# Patient Record
Sex: FEMALE | Race: WHITE | NOT HISPANIC OR LATINO | Employment: OTHER | URBAN - NONMETROPOLITAN AREA
[De-identification: names, ages, dates, MRNs, and addresses within clinical notes are randomized per-mention and may not be internally consistent; named-entity substitution may affect disease eponyms.]

---

## 2017-02-01 ENCOUNTER — HOSPITAL ENCOUNTER (EMERGENCY)
Facility: HOSPITAL | Age: 37
Discharge: HOME | End: 2017-02-01
Attending: EMERGENCY MEDICINE
Payer: MEDICARE

## 2017-02-01 VITALS
TEMPERATURE: 97.6 F | OXYGEN SATURATION: 97 % | DIASTOLIC BLOOD PRESSURE: 53 MMHG | WEIGHT: 90 LBS | RESPIRATION RATE: 18 BRPM | HEART RATE: 94 BPM | SYSTOLIC BLOOD PRESSURE: 80 MMHG

## 2017-02-01 DIAGNOSIS — K94.23 PEG TUBE MALFUNCTION (HCC): Primary | ICD-10-CM

## 2017-02-01 PROCEDURE — 99284 EMERGENCY DEPT VISIT MOD MDM: CPT

## 2017-02-16 ENCOUNTER — HOSPITAL ENCOUNTER (EMERGENCY)
Facility: HOSPITAL | Age: 37
Discharge: NURSING, REHAB OR LONG-TERM CARE | End: 2017-02-17
Attending: EMERGENCY MEDICINE
Payer: MEDICARE

## 2017-02-16 DIAGNOSIS — E72.20 HYPERAMMONEMIA (HCC): ICD-10-CM

## 2017-02-16 DIAGNOSIS — N39.0 UTI (URINARY TRACT INFECTION): Primary | ICD-10-CM

## 2017-02-16 LAB — PHENYTOIN SERPL-MCNC: <0.4 UG/ML (ref 10–20)

## 2017-02-16 PROCEDURE — 80185 ASSAY OF PHENYTOIN TOTAL: CPT | Performed by: EMERGENCY MEDICINE

## 2017-02-16 PROCEDURE — 36415 COLL VENOUS BLD VENIPUNCTURE: CPT | Performed by: EMERGENCY MEDICINE

## 2017-02-16 RX ORDER — LACTULOSE 20 G/30ML
20 SOLUTION ORAL ONCE
Status: COMPLETED | OUTPATIENT
Start: 2017-02-16 | End: 2017-02-16

## 2017-02-16 RX ORDER — AMOXICILLIN AND CLAVULANATE POTASSIUM 400; 57 MG/5ML; MG/5ML
875 POWDER, FOR SUSPENSION ORAL ONCE
Status: COMPLETED | OUTPATIENT
Start: 2017-02-16 | End: 2017-02-16

## 2017-02-16 RX ADMIN — LACTULOSE 20 G: 10 SOLUTION ORAL at 21:20

## 2017-02-16 RX ADMIN — AMOXICILLIN AND CLAVULANATE POTASSIUM 875 MG OF AMOXICILLIN: 400; 57 POWDER, FOR SUSPENSION ORAL at 21:03

## 2017-02-17 VITALS
SYSTOLIC BLOOD PRESSURE: 111 MMHG | HEART RATE: 84 BPM | OXYGEN SATURATION: 98 % | DIASTOLIC BLOOD PRESSURE: 68 MMHG | WEIGHT: 90.6 LBS | TEMPERATURE: 98.7 F | RESPIRATION RATE: 18 BRPM

## 2017-02-17 PROCEDURE — 99285 EMERGENCY DEPT VISIT HI MDM: CPT

## 2017-02-21 ENCOUNTER — GENERIC CONVERSION - ENCOUNTER (OUTPATIENT)
Dept: OTHER | Facility: OTHER | Age: 37
End: 2017-02-21

## 2017-02-22 ENCOUNTER — HOSPITAL ENCOUNTER (INPATIENT)
Facility: HOSPITAL | Age: 37
LOS: 5 days | Discharge: NURSING, REHAB OR LONG-TERM CARE | DRG: 871 | End: 2017-02-27
Attending: HOSPITALIST | Admitting: HOSPITALIST
Payer: MEDICARE

## 2017-02-22 ENCOUNTER — APPOINTMENT (EMERGENCY)
Dept: RADIOLOGY | Facility: HOSPITAL | Age: 37
End: 2017-02-22
Payer: MEDICARE

## 2017-02-22 ENCOUNTER — HOSPITAL ENCOUNTER (EMERGENCY)
Facility: HOSPITAL | Age: 37
End: 2017-02-22
Attending: EMERGENCY MEDICINE | Admitting: EMERGENCY MEDICINE
Payer: MEDICARE

## 2017-02-22 ENCOUNTER — APPOINTMENT (EMERGENCY)
Dept: CT IMAGING | Facility: HOSPITAL | Age: 37
End: 2017-02-22
Payer: MEDICARE

## 2017-02-22 VITALS
RESPIRATION RATE: 18 BRPM | OXYGEN SATURATION: 92 % | HEART RATE: 100 BPM | WEIGHT: 90 LBS | TEMPERATURE: 100.6 F | DIASTOLIC BLOOD PRESSURE: 56 MMHG | SYSTOLIC BLOOD PRESSURE: 109 MMHG

## 2017-02-22 DIAGNOSIS — G40.812 LENNOX-GASTAUT SYNDROME, NOT INTRACTABLE, WITHOUT STATUS EPILEPTICUS (HCC): ICD-10-CM

## 2017-02-22 DIAGNOSIS — Z79.899 OTHER LONG TERM (CURRENT) DRUG THERAPY: ICD-10-CM

## 2017-02-22 DIAGNOSIS — J18.9 SEPSIS DUE TO PNEUMONIA (HCC): ICD-10-CM

## 2017-02-22 DIAGNOSIS — A41.9 SEPSIS DUE TO PNEUMONIA (HCC): ICD-10-CM

## 2017-02-22 DIAGNOSIS — J18.9 RIGHT UPPER LOBE PNEUMONIA: Primary | ICD-10-CM

## 2017-02-22 PROBLEM — R56.9 SEIZURE (HCC): Status: ACTIVE | Noted: 2017-02-22

## 2017-02-22 PROBLEM — R13.10 DYSPHAGIA: Status: ACTIVE | Noted: 2017-02-22

## 2017-02-22 LAB
ALBUMIN SERPL BCP-MCNC: 3.1 G/DL (ref 3.5–5)
ALP SERPL-CCNC: 104 U/L (ref 46–116)
ALT SERPL W P-5'-P-CCNC: 32 U/L (ref 12–78)
ANION GAP SERPL CALCULATED.3IONS-SCNC: 12 MMOL/L (ref 4–13)
APTT PPP: 31 SECONDS (ref 24–36)
AST SERPL W P-5'-P-CCNC: 24 U/L (ref 5–45)
ATRIAL RATE: 121 BPM
BASOPHILS # BLD MANUAL: 0 THOUSAND/UL (ref 0–0.1)
BASOPHILS NFR MAR MANUAL: 0 % (ref 0–1)
BILIRUB SERPL-MCNC: 0.3 MG/DL (ref 0.2–1)
BILIRUB UR QL STRIP: NEGATIVE
BUN SERPL-MCNC: 13 MG/DL (ref 5–25)
CALCIUM SERPL-MCNC: 8.4 MG/DL (ref 8.3–10.1)
CHLORIDE SERPL-SCNC: 104 MMOL/L (ref 100–108)
CLARITY UR: CLEAR
CO2 SERPL-SCNC: 23 MMOL/L (ref 21–32)
COLOR UR: YELLOW
CREAT SERPL-MCNC: 0.63 MG/DL (ref 0.6–1.3)
EOSINOPHIL # BLD MANUAL: 0 THOUSAND/UL (ref 0–0.4)
EOSINOPHIL NFR BLD MANUAL: 0 % (ref 0–6)
ERYTHROCYTE [DISTWIDTH] IN BLOOD BY AUTOMATED COUNT: 13.9 % (ref 11.6–15.1)
GFR SERPL CREATININE-BSD FRML MDRD: >60 ML/MIN/1.73SQ M
GLUCOSE SERPL-MCNC: 178 MG/DL (ref 65–140)
GLUCOSE UR STRIP-MCNC: NEGATIVE MG/DL
HCG SERPL QL: NEGATIVE
HCT VFR BLD AUTO: 43.2 % (ref 34.8–46.1)
HGB BLD-MCNC: 14.1 G/DL (ref 11.5–15.4)
HGB UR QL STRIP.AUTO: NEGATIVE
HOLD SPECIMEN: NORMAL
HOLD SPECIMEN: NORMAL
HOLD SPECIMEN: YES
INR PPP: 1.18 (ref 0.86–1.16)
KETONES UR STRIP-MCNC: ABNORMAL MG/DL
LACTATE SERPL-SCNC: 1.4 MMOL/L (ref 0.5–2)
LACTATE SERPL-SCNC: 2.5 MMOL/L (ref 0.5–2)
LEUKOCYTE ESTERASE UR QL STRIP: NEGATIVE
LG PLATELETS BLD QL SMEAR: PRESENT
LYMPHOCYTES # BLD AUTO: 2.01 THOUSAND/UL (ref 0.6–4.47)
LYMPHOCYTES # BLD AUTO: 9 % (ref 14–44)
MAGNESIUM SERPL-MCNC: 2.3 MG/DL (ref 1.6–2.6)
MCH RBC QN AUTO: 33.6 PG (ref 26.8–34.3)
MCHC RBC AUTO-ENTMCNC: 32.6 G/DL (ref 31.4–37.4)
MCV RBC AUTO: 103 FL (ref 82–98)
MONOCYTES # BLD AUTO: 0.67 THOUSAND/UL (ref 0–1.22)
MONOCYTES NFR BLD: 3 % (ref 4–12)
NEUTROPHILS # BLD MANUAL: 19.66 THOUSAND/UL (ref 1.85–7.62)
NEUTS BAND NFR BLD MANUAL: 4 % (ref 0–8)
NEUTS SEG NFR BLD AUTO: 84 % (ref 43–75)
NITRITE UR QL STRIP: NEGATIVE
P AXIS: 69 DEGREES
PH UR STRIP.AUTO: 7 [PH] (ref 4.5–8)
PHENYTOIN SERPL-MCNC: <0.4 UG/ML (ref 10–20)
PLATELET # BLD AUTO: 188 THOUSANDS/UL (ref 149–390)
PLATELET BLD QL SMEAR: ADEQUATE
PMV BLD AUTO: 11.2 FL (ref 8.9–12.7)
POTASSIUM SERPL-SCNC: 3.8 MMOL/L (ref 3.5–5.3)
PR INTERVAL: 126 MS
PROT SERPL-MCNC: 7.2 G/DL (ref 6.4–8.2)
PROT UR STRIP-MCNC: NEGATIVE MG/DL
PROTHROMBIN TIME: 14.6 SECONDS (ref 12–14.3)
QRS AXIS: 86 DEGREES
QRSD INTERVAL: 74 MS
QT INTERVAL: 276 MS
QTC INTERVAL: 391 MS
RBC # BLD AUTO: 4.2 MILLION/UL (ref 3.81–5.12)
SODIUM SERPL-SCNC: 139 MMOL/L (ref 136–145)
SP GR UR STRIP.AUTO: 1.01 (ref 1–1.03)
T WAVE AXIS: 39 DEGREES
TOTAL CELLS COUNTED SPEC: 100
TROPONIN I SERPL-MCNC: <0.02 NG/ML
UROBILINOGEN UR QL STRIP.AUTO: 1 E.U./DL
VENTRICULAR RATE: 121 BPM
WBC # BLD AUTO: 22.34 THOUSAND/UL (ref 4.31–10.16)

## 2017-02-22 PROCEDURE — 80185 ASSAY OF PHENYTOIN TOTAL: CPT | Performed by: EMERGENCY MEDICINE

## 2017-02-22 PROCEDURE — 93005 ELECTROCARDIOGRAM TRACING: CPT | Performed by: EMERGENCY MEDICINE

## 2017-02-22 PROCEDURE — 71010 HB CHEST X-RAY 1 VIEW FRONTAL (PORTABLE): CPT

## 2017-02-22 PROCEDURE — 85610 PROTHROMBIN TIME: CPT | Performed by: EMERGENCY MEDICINE

## 2017-02-22 PROCEDURE — 85730 THROMBOPLASTIN TIME PARTIAL: CPT | Performed by: EMERGENCY MEDICINE

## 2017-02-22 PROCEDURE — 84703 CHORIONIC GONADOTROPIN ASSAY: CPT | Performed by: EMERGENCY MEDICINE

## 2017-02-22 PROCEDURE — 71260 CT THORAX DX C+: CPT

## 2017-02-22 PROCEDURE — 96361 HYDRATE IV INFUSION ADD-ON: CPT

## 2017-02-22 PROCEDURE — 85027 COMPLETE CBC AUTOMATED: CPT | Performed by: EMERGENCY MEDICINE

## 2017-02-22 PROCEDURE — 85007 BL SMEAR W/DIFF WBC COUNT: CPT | Performed by: EMERGENCY MEDICINE

## 2017-02-22 PROCEDURE — 94760 N-INVAS EAR/PLS OXIMETRY 1: CPT

## 2017-02-22 PROCEDURE — 99285 EMERGENCY DEPT VISIT HI MDM: CPT

## 2017-02-22 PROCEDURE — 94640 AIRWAY INHALATION TREATMENT: CPT

## 2017-02-22 PROCEDURE — 80053 COMPREHEN METABOLIC PANEL: CPT | Performed by: EMERGENCY MEDICINE

## 2017-02-22 PROCEDURE — 87040 BLOOD CULTURE FOR BACTERIA: CPT | Performed by: EMERGENCY MEDICINE

## 2017-02-22 PROCEDURE — 81003 URINALYSIS AUTO W/O SCOPE: CPT | Performed by: EMERGENCY MEDICINE

## 2017-02-22 PROCEDURE — 94669 MECHANICAL CHEST WALL OSCILL: CPT

## 2017-02-22 PROCEDURE — 84484 ASSAY OF TROPONIN QUANT: CPT | Performed by: EMERGENCY MEDICINE

## 2017-02-22 PROCEDURE — 96367 TX/PROPH/DG ADDL SEQ IV INF: CPT

## 2017-02-22 PROCEDURE — 74177 CT ABD & PELVIS W/CONTRAST: CPT

## 2017-02-22 PROCEDURE — 83735 ASSAY OF MAGNESIUM: CPT | Performed by: EMERGENCY MEDICINE

## 2017-02-22 PROCEDURE — 36415 COLL VENOUS BLD VENIPUNCTURE: CPT

## 2017-02-22 PROCEDURE — 83605 ASSAY OF LACTIC ACID: CPT | Performed by: EMERGENCY MEDICINE

## 2017-02-22 PROCEDURE — 70450 CT HEAD/BRAIN W/O DYE: CPT

## 2017-02-22 PROCEDURE — 96365 THER/PROPH/DIAG IV INF INIT: CPT

## 2017-02-22 RX ORDER — ACETAMINOPHEN 160 MG/5ML
SOLUTION ORAL
Status: COMPLETED
Start: 2017-02-22 | End: 2017-02-22

## 2017-02-22 RX ORDER — RUFINAMIDE 400 MG/1
800 TABLET, FILM COATED ORAL EVERY 12 HOURS
Status: DISCONTINUED | OUTPATIENT
Start: 2017-02-22 | End: 2017-02-22

## 2017-02-22 RX ORDER — ACETAMINOPHEN 160 MG/5ML
650 SUSPENSION, ORAL (FINAL DOSE FORM) ORAL ONCE
Status: COMPLETED | OUTPATIENT
Start: 2017-02-22 | End: 2017-02-22

## 2017-02-22 RX ORDER — VALPROIC ACID 250 MG/5ML
5 SOLUTION ORAL EVERY 8 HOURS
COMMUNITY
End: 2017-12-07 | Stop reason: HOSPADM

## 2017-02-22 RX ORDER — TOPIRAMATE 100 MG/1
200 TABLET, FILM COATED ORAL EVERY 12 HOURS
Status: DISCONTINUED | OUTPATIENT
Start: 2017-02-22 | End: 2017-02-27 | Stop reason: HOSPADM

## 2017-02-22 RX ORDER — 0.9 % SODIUM CHLORIDE 0.9 %
3 VIAL (ML) INJECTION AS NEEDED
Status: DISCONTINUED | OUTPATIENT
Start: 2017-02-22 | End: 2017-02-22 | Stop reason: HOSPADM

## 2017-02-22 RX ORDER — LAMOTRIGINE 25 MG/1
50 TABLET ORAL EVERY 12 HOURS
Status: DISCONTINUED | OUTPATIENT
Start: 2017-02-22 | End: 2017-02-27 | Stop reason: HOSPADM

## 2017-02-22 RX ORDER — CLOBAZAM 20 MG/1
20 TABLET ORAL 2 TIMES DAILY
COMMUNITY
End: 2017-10-26 | Stop reason: HOSPADM

## 2017-02-22 RX ORDER — AMOXICILLIN 250 MG
1 CAPSULE ORAL DAILY
Status: DISCONTINUED | OUTPATIENT
Start: 2017-02-23 | End: 2017-02-22

## 2017-02-22 RX ORDER — SODIUM CHLORIDE 9 MG/ML
75 INJECTION, SOLUTION INTRAVENOUS ONCE
Status: COMPLETED | OUTPATIENT
Start: 2017-02-22 | End: 2017-02-22

## 2017-02-22 RX ORDER — MULTIVIT-MIN/FOLIC ACID/LUTEIN 400-250MCG
1 TABLET,CHEWABLE ORAL DAILY
COMMUNITY
End: 2017-02-22

## 2017-02-22 RX ORDER — AZITHROMYCIN 500 MG/1
INJECTION, POWDER, LYOPHILIZED, FOR SOLUTION INTRAVENOUS
Status: DISCONTINUED
Start: 2017-02-22 | End: 2017-02-22 | Stop reason: HOSPADM

## 2017-02-22 RX ORDER — RUFINAMIDE 200 MG/1
800 TABLET, FILM COATED ORAL EVERY 12 HOURS
Status: DISCONTINUED | OUTPATIENT
Start: 2017-02-22 | End: 2017-02-23

## 2017-02-22 RX ORDER — RUFINAMIDE 400 MG/1
1600 TABLET, FILM COATED ORAL 2 TIMES DAILY WITH MEALS
Status: DISCONTINUED | OUTPATIENT
Start: 2017-02-22 | End: 2017-02-22 | Stop reason: HOSPADM

## 2017-02-22 RX ORDER — ALBUTEROL SULFATE 2.5 MG/3ML
2.5 SOLUTION RESPIRATORY (INHALATION)
Status: DISCONTINUED | OUTPATIENT
Start: 2017-02-22 | End: 2017-02-22

## 2017-02-22 RX ORDER — ONDANSETRON 2 MG/ML
4 INJECTION INTRAMUSCULAR; INTRAVENOUS EVERY 6 HOURS PRN
Status: DISCONTINUED | OUTPATIENT
Start: 2017-02-22 | End: 2017-02-27 | Stop reason: HOSPADM

## 2017-02-22 RX ORDER — ACETAMINOPHEN 160 MG/5ML
490 SUSPENSION, ORAL (FINAL DOSE FORM) ORAL ONCE
Status: COMPLETED | OUTPATIENT
Start: 2017-02-22 | End: 2017-02-22

## 2017-02-22 RX ORDER — AMOXICILLIN 250 MG
1 CAPSULE ORAL DAILY
Status: DISCONTINUED | OUTPATIENT
Start: 2017-02-23 | End: 2017-02-27 | Stop reason: HOSPADM

## 2017-02-22 RX ORDER — 0.9 % SODIUM CHLORIDE 0.9 %
3 VIAL (ML) INJECTION AS NEEDED
Status: DISCONTINUED | OUTPATIENT
Start: 2017-02-22 | End: 2017-02-27 | Stop reason: HOSPADM

## 2017-02-22 RX ORDER — SODIUM CHLORIDE FOR INHALATION 0.9 %
3 VIAL, NEBULIZER (ML) INHALATION EVERY 4 HOURS PRN
Status: DISCONTINUED | OUTPATIENT
Start: 2017-02-22 | End: 2017-02-27 | Stop reason: HOSPADM

## 2017-02-22 RX ORDER — ACETAMINOPHEN 325 MG/1
650 TABLET ORAL EVERY 6 HOURS PRN
Status: DISCONTINUED | OUTPATIENT
Start: 2017-02-22 | End: 2017-02-27 | Stop reason: HOSPADM

## 2017-02-22 RX ORDER — VANCOMYCIN HYDROCHLORIDE 500 MG/100ML
15 INJECTION, SOLUTION INTRAVENOUS EVERY 12 HOURS
Status: DISCONTINUED | OUTPATIENT
Start: 2017-02-22 | End: 2017-02-27 | Stop reason: HOSPADM

## 2017-02-22 RX ORDER — LAMOTRIGINE 200 MG/1
200 TABLET ORAL 2 TIMES DAILY
COMMUNITY
End: 2017-02-27

## 2017-02-22 RX ORDER — LAMOTRIGINE 100 MG/1
200 TABLET ORAL EVERY 12 HOURS
Status: DISCONTINUED | OUTPATIENT
Start: 2017-02-22 | End: 2017-02-22

## 2017-02-22 RX ORDER — BISACODYL 10 MG
10 SUPPOSITORY, RECTAL RECTAL DAILY PRN
Status: DISCONTINUED | OUTPATIENT
Start: 2017-02-22 | End: 2017-02-27 | Stop reason: HOSPADM

## 2017-02-22 RX ORDER — LAMOTRIGINE 100 MG/1
200 TABLET ORAL EVERY 12 HOURS
Status: DISCONTINUED | OUTPATIENT
Start: 2017-02-22 | End: 2017-02-27 | Stop reason: HOSPADM

## 2017-02-22 RX ORDER — TOPIRAMATE 100 MG/1
200 TABLET, FILM COATED ORAL 2 TIMES DAILY
Status: DISCONTINUED | OUTPATIENT
Start: 2017-02-22 | End: 2017-02-22 | Stop reason: HOSPADM

## 2017-02-22 RX ADMIN — ENOXAPARIN SODIUM 40 MG: 40 INJECTION SUBCUTANEOUS at 11:10

## 2017-02-22 RX ADMIN — LAMOTRIGINE 200 MG: 100 TABLET ORAL at 20:44

## 2017-02-22 RX ADMIN — PIPERACILLIN SODIUM,TAZOBACTAM SODIUM 3.38 G: 3; .375 INJECTION, POWDER, FOR SOLUTION INTRAVENOUS at 11:28

## 2017-02-22 RX ADMIN — TOPIRAMATE 200 MG: 100 TABLET ORAL at 05:55

## 2017-02-22 RX ADMIN — ALBUTEROL SULFATE 2.5 MG: 2.5 SOLUTION RESPIRATORY (INHALATION) at 11:39

## 2017-02-22 RX ADMIN — AZITHROMYCIN FOR INJECTION INJECTION, POWDER, LYOPHILIZED, FOR SOLUTION 500 MG: 500 INJECTION INTRAVENOUS at 05:56

## 2017-02-22 RX ADMIN — CEFEPIME HYDROCHLORIDE 2000 MG: 2 INJECTION, POWDER, FOR SOLUTION INTRAVENOUS at 02:48

## 2017-02-22 RX ADMIN — TOPIRAMATE 200 MG: 100 TABLET, FILM COATED ORAL at 20:44

## 2017-02-22 RX ADMIN — ACETAMINOPHEN 650 MG: 160 SUSPENSION ORAL at 05:31

## 2017-02-22 RX ADMIN — IOHEXOL 100 ML: 350 INJECTION, SOLUTION INTRAVENOUS at 04:27

## 2017-02-22 RX ADMIN — ACETAMINOPHEN 490 MG: 160 SOLUTION ORAL at 00:31

## 2017-02-22 RX ADMIN — VANCOMYCIN HYDROCHLORIDE 500 MG: 1 INJECTION, POWDER, LYOPHILIZED, FOR SOLUTION INTRAVENOUS at 03:30

## 2017-02-22 RX ADMIN — LAMOTRIGINE 250 MG: 100 TABLET ORAL at 05:55

## 2017-02-22 RX ADMIN — RUFINAMIDE 800 MG: 200 TABLET, FILM COATED ORAL at 20:44

## 2017-02-22 RX ADMIN — LAMOTRIGINE 50 MG: 25 TABLET ORAL at 20:43

## 2017-02-22 RX ADMIN — SODIUM CHLORIDE 1000 ML: 0.9 INJECTION, SOLUTION INTRAVENOUS at 02:49

## 2017-02-22 RX ADMIN — PIPERACILLIN SODIUM,TAZOBACTAM SODIUM 3.38 G: 3; .375 INJECTION, POWDER, FOR SOLUTION INTRAVENOUS at 18:39

## 2017-02-22 RX ADMIN — SODIUM CHLORIDE 75 ML/HR: 0.9 INJECTION, SOLUTION INTRAVENOUS at 11:10

## 2017-02-22 RX ADMIN — VANCOMYCIN HYDROCHLORIDE 500 MG: 500 INJECTION, SOLUTION INTRAVENOUS at 17:13

## 2017-02-22 RX ADMIN — Medication 490 MG: at 00:31

## 2017-02-22 RX ADMIN — SODIUM CHLORIDE 1224 ML: 0.9 INJECTION, SOLUTION INTRAVENOUS at 02:49

## 2017-02-23 PROBLEM — G93.40 ACUTE ENCEPHALOPATHY: Status: ACTIVE | Noted: 2017-02-23

## 2017-02-23 PROBLEM — J18.9 HCAP (HEALTHCARE-ASSOCIATED PNEUMONIA): Status: ACTIVE | Noted: 2017-02-23

## 2017-02-23 LAB
ALBUMIN SERPL BCP-MCNC: 2.4 G/DL (ref 3.5–5)
ALP SERPL-CCNC: 85 U/L (ref 46–116)
ALT SERPL W P-5'-P-CCNC: 24 U/L (ref 12–78)
ANION GAP SERPL CALCULATED.3IONS-SCNC: 9 MMOL/L (ref 4–13)
AST SERPL W P-5'-P-CCNC: 20 U/L (ref 5–45)
BASOPHILS # BLD AUTO: 0.04 THOUSANDS/ΜL (ref 0–0.1)
BASOPHILS NFR BLD AUTO: 0 % (ref 0–1)
BILIRUB SERPL-MCNC: 0.51 MG/DL (ref 0.2–1)
BUN SERPL-MCNC: 12 MG/DL (ref 5–25)
CALCIUM SERPL-MCNC: 8.2 MG/DL (ref 8.3–10.1)
CHLORIDE SERPL-SCNC: 113 MMOL/L (ref 100–108)
CO2 SERPL-SCNC: 21 MMOL/L (ref 21–32)
CREAT SERPL-MCNC: 0.33 MG/DL (ref 0.6–1.3)
EOSINOPHIL # BLD AUTO: 0.07 THOUSAND/ΜL (ref 0–0.61)
EOSINOPHIL NFR BLD AUTO: 0 % (ref 0–6)
ERYTHROCYTE [DISTWIDTH] IN BLOOD BY AUTOMATED COUNT: 13.8 % (ref 11.6–15.1)
FLUAV AG SPEC QL: NORMAL
FLUBV AG SPEC QL: NORMAL
GFR SERPL CREATININE-BSD FRML MDRD: >60 ML/MIN/1.73SQ M
GLUCOSE SERPL-MCNC: 79 MG/DL (ref 65–140)
HCT VFR BLD AUTO: 33 % (ref 34.8–46.1)
HGB BLD-MCNC: 10.8 G/DL (ref 11.5–15.4)
L PNEUMO1 AG UR QL IA.RAPID: NEGATIVE
LYMPHOCYTES # BLD AUTO: 2.8 THOUSANDS/ΜL (ref 0.6–4.47)
LYMPHOCYTES NFR BLD AUTO: 13 % (ref 14–44)
MCH RBC QN AUTO: 33.3 PG (ref 26.8–34.3)
MCHC RBC AUTO-ENTMCNC: 32.7 G/DL (ref 31.4–37.4)
MCV RBC AUTO: 102 FL (ref 82–98)
MONOCYTES # BLD AUTO: 1.82 THOUSAND/ΜL (ref 0.17–1.22)
MONOCYTES NFR BLD AUTO: 9 % (ref 4–12)
NEUTROPHILS # BLD AUTO: 16.41 THOUSANDS/ΜL (ref 1.85–7.62)
NEUTS SEG NFR BLD AUTO: 78 % (ref 43–75)
NRBC BLD AUTO-RTO: 0 /100 WBCS
PLATELET # BLD AUTO: 183 THOUSANDS/UL (ref 149–390)
PMV BLD AUTO: 10.9 FL (ref 8.9–12.7)
POTASSIUM SERPL-SCNC: 3.9 MMOL/L (ref 3.5–5.3)
PROT SERPL-MCNC: 5.8 G/DL (ref 6.4–8.2)
RBC # BLD AUTO: 3.24 MILLION/UL (ref 3.81–5.12)
RSV B RNA SPEC QL NAA+PROBE: NORMAL
S PNEUM AG UR QL: NEGATIVE
SODIUM SERPL-SCNC: 143 MMOL/L (ref 136–145)
WBC # BLD AUTO: 21.24 THOUSAND/UL (ref 4.31–10.16)

## 2017-02-23 PROCEDURE — 87449 NOS EACH ORGANISM AG IA: CPT | Performed by: HOSPITALIST

## 2017-02-23 PROCEDURE — 87798 DETECT AGENT NOS DNA AMP: CPT | Performed by: HOSPITALIST

## 2017-02-23 PROCEDURE — 94669 MECHANICAL CHEST WALL OSCILL: CPT

## 2017-02-23 PROCEDURE — 80053 COMPREHEN METABOLIC PANEL: CPT | Performed by: HOSPITALIST

## 2017-02-23 PROCEDURE — 94760 N-INVAS EAR/PLS OXIMETRY 1: CPT

## 2017-02-23 PROCEDURE — 85025 COMPLETE CBC W/AUTO DIFF WBC: CPT | Performed by: HOSPITALIST

## 2017-02-23 PROCEDURE — 92610 EVALUATE SWALLOWING FUNCTION: CPT

## 2017-02-23 RX ORDER — SODIUM CHLORIDE 9 MG/ML
75 INJECTION, SOLUTION INTRAVENOUS CONTINUOUS
Status: DISCONTINUED | OUTPATIENT
Start: 2017-02-23 | End: 2017-02-27 | Stop reason: HOSPADM

## 2017-02-23 RX ORDER — RUFINAMIDE 200 MG/1
800 TABLET, FILM COATED ORAL EVERY 12 HOURS
Status: DISCONTINUED | OUTPATIENT
Start: 2017-02-23 | End: 2017-02-27 | Stop reason: HOSPADM

## 2017-02-23 RX ADMIN — PIPERACILLIN SODIUM,TAZOBACTAM SODIUM 3.38 G: 3; .375 INJECTION, POWDER, FOR SOLUTION INTRAVENOUS at 17:09

## 2017-02-23 RX ADMIN — SODIUM CHLORIDE 1000 ML: 0.9 INJECTION, SOLUTION INTRAVENOUS at 10:10

## 2017-02-23 RX ADMIN — LAMOTRIGINE 200 MG: 100 TABLET ORAL at 19:52

## 2017-02-23 RX ADMIN — SODIUM CHLORIDE 500 ML: 0.9 INJECTION, SOLUTION INTRAVENOUS at 10:08

## 2017-02-23 RX ADMIN — VALPROIC ACID 250 MG: 250 SOLUTION ORAL at 00:48

## 2017-02-23 RX ADMIN — Medication 1 TABLET: at 08:12

## 2017-02-23 RX ADMIN — RUFINAMIDE 800 MG: 200 TABLET, FILM COATED ORAL at 12:53

## 2017-02-23 RX ADMIN — PIPERACILLIN SODIUM,TAZOBACTAM SODIUM 3.38 G: 3; .375 INJECTION, POWDER, FOR SOLUTION INTRAVENOUS at 00:48

## 2017-02-23 RX ADMIN — LAMOTRIGINE 50 MG: 25 TABLET ORAL at 08:11

## 2017-02-23 RX ADMIN — ENOXAPARIN SODIUM 40 MG: 40 INJECTION SUBCUTANEOUS at 08:11

## 2017-02-23 RX ADMIN — SODIUM CHLORIDE 125 ML/HR: 0.9 INJECTION, SOLUTION INTRAVENOUS at 11:48

## 2017-02-23 RX ADMIN — LAMOTRIGINE 50 MG: 25 TABLET ORAL at 19:53

## 2017-02-23 RX ADMIN — TOPIRAMATE 200 MG: 100 TABLET, FILM COATED ORAL at 19:52

## 2017-02-23 RX ADMIN — VALPROIC ACID 250 MG: 250 SOLUTION ORAL at 12:50

## 2017-02-23 RX ADMIN — PIPERACILLIN SODIUM,TAZOBACTAM SODIUM 3.38 G: 3; .375 INJECTION, POWDER, FOR SOLUTION INTRAVENOUS at 06:59

## 2017-02-23 RX ADMIN — VANCOMYCIN HYDROCHLORIDE 500 MG: 500 INJECTION, SOLUTION INTRAVENOUS at 18:28

## 2017-02-23 RX ADMIN — AZITHROMYCIN MONOHYDRATE 500 MG: 500 INJECTION, POWDER, LYOPHILIZED, FOR SOLUTION INTRAVENOUS at 05:31

## 2017-02-23 RX ADMIN — TOPIRAMATE 200 MG: 100 TABLET, FILM COATED ORAL at 08:12

## 2017-02-23 RX ADMIN — LAMOTRIGINE 200 MG: 100 TABLET ORAL at 08:10

## 2017-02-23 RX ADMIN — PIPERACILLIN SODIUM,TAZOBACTAM SODIUM 3.38 G: 3; .375 INJECTION, POWDER, FOR SOLUTION INTRAVENOUS at 12:53

## 2017-02-23 RX ADMIN — VANCOMYCIN HYDROCHLORIDE 500 MG: 500 INJECTION, SOLUTION INTRAVENOUS at 04:49

## 2017-02-23 RX ADMIN — VALPROIC ACID 250 MG: 250 SOLUTION ORAL at 17:10

## 2017-02-24 LAB
ANION GAP SERPL CALCULATED.3IONS-SCNC: 7 MMOL/L (ref 4–13)
BASOPHILS # BLD AUTO: 0.03 THOUSANDS/ΜL (ref 0–0.1)
BASOPHILS NFR BLD AUTO: 0 % (ref 0–1)
BUN SERPL-MCNC: 5 MG/DL (ref 5–25)
CALCIUM SERPL-MCNC: 8 MG/DL (ref 8.3–10.1)
CHLORIDE SERPL-SCNC: 116 MMOL/L (ref 100–108)
CO2 SERPL-SCNC: 22 MMOL/L (ref 21–32)
CREAT SERPL-MCNC: 0.2 MG/DL (ref 0.6–1.3)
EOSINOPHIL # BLD AUTO: 0.27 THOUSAND/ΜL (ref 0–0.61)
EOSINOPHIL NFR BLD AUTO: 3 % (ref 0–6)
ERYTHROCYTE [DISTWIDTH] IN BLOOD BY AUTOMATED COUNT: 13.9 % (ref 11.6–15.1)
GFR SERPL CREATININE-BSD FRML MDRD: >60 ML/MIN/1.73SQ M
GLUCOSE SERPL-MCNC: 90 MG/DL (ref 65–140)
HCT VFR BLD AUTO: 34 % (ref 34.8–46.1)
HGB BLD-MCNC: 11.1 G/DL (ref 11.5–15.4)
LYMPHOCYTES # BLD AUTO: 2.45 THOUSANDS/ΜL (ref 0.6–4.47)
LYMPHOCYTES NFR BLD AUTO: 23 % (ref 14–44)
MCH RBC QN AUTO: 33.3 PG (ref 26.8–34.3)
MCHC RBC AUTO-ENTMCNC: 32.6 G/DL (ref 31.4–37.4)
MCV RBC AUTO: 102 FL (ref 82–98)
MONOCYTES # BLD AUTO: 0.79 THOUSAND/ΜL (ref 0.17–1.22)
MONOCYTES NFR BLD AUTO: 7 % (ref 4–12)
NEUTROPHILS # BLD AUTO: 7.12 THOUSANDS/ΜL (ref 1.85–7.62)
NEUTS SEG NFR BLD AUTO: 67 % (ref 43–75)
NRBC BLD AUTO-RTO: 0 /100 WBCS
PLATELET # BLD AUTO: 189 THOUSANDS/UL (ref 149–390)
PMV BLD AUTO: 10.9 FL (ref 8.9–12.7)
POTASSIUM SERPL-SCNC: 3.5 MMOL/L (ref 3.5–5.3)
RBC # BLD AUTO: 3.33 MILLION/UL (ref 3.81–5.12)
SODIUM SERPL-SCNC: 145 MMOL/L (ref 136–145)
VANCOMYCIN TROUGH SERPL-MCNC: 10.9 UG/ML (ref 10–20)
WBC # BLD AUTO: 10.68 THOUSAND/UL (ref 4.31–10.16)

## 2017-02-24 PROCEDURE — 80202 ASSAY OF VANCOMYCIN: CPT | Performed by: INTERNAL MEDICINE

## 2017-02-24 PROCEDURE — 94669 MECHANICAL CHEST WALL OSCILL: CPT

## 2017-02-24 PROCEDURE — 92526 ORAL FUNCTION THERAPY: CPT

## 2017-02-24 PROCEDURE — 80048 BASIC METABOLIC PNL TOTAL CA: CPT | Performed by: INTERNAL MEDICINE

## 2017-02-24 PROCEDURE — 94760 N-INVAS EAR/PLS OXIMETRY 1: CPT

## 2017-02-24 PROCEDURE — 85025 COMPLETE CBC W/AUTO DIFF WBC: CPT | Performed by: INTERNAL MEDICINE

## 2017-02-24 RX ORDER — POTASSIUM CHLORIDE 20MEQ/15ML
20 LIQUID (ML) ORAL ONCE
Status: COMPLETED | OUTPATIENT
Start: 2017-02-24 | End: 2017-02-24

## 2017-02-24 RX ADMIN — AZITHROMYCIN MONOHYDRATE 500 MG: 500 INJECTION, POWDER, LYOPHILIZED, FOR SOLUTION INTRAVENOUS at 05:32

## 2017-02-24 RX ADMIN — ENOXAPARIN SODIUM 40 MG: 40 INJECTION SUBCUTANEOUS at 11:52

## 2017-02-24 RX ADMIN — RUFINAMIDE 800 MG: 200 TABLET, FILM COATED ORAL at 00:51

## 2017-02-24 RX ADMIN — POTASSIUM CHLORIDE 20 MEQ: 20 SOLUTION ORAL at 16:35

## 2017-02-24 RX ADMIN — VALPROIC ACID 250 MG: 250 SOLUTION ORAL at 11:53

## 2017-02-24 RX ADMIN — RUFINAMIDE 800 MG: 200 TABLET, FILM COATED ORAL at 22:22

## 2017-02-24 RX ADMIN — LAMOTRIGINE 200 MG: 100 TABLET ORAL at 20:14

## 2017-02-24 RX ADMIN — VANCOMYCIN HYDROCHLORIDE 500 MG: 500 INJECTION, SOLUTION INTRAVENOUS at 07:53

## 2017-02-24 RX ADMIN — PIPERACILLIN SODIUM,TAZOBACTAM SODIUM 3.38 G: 3; .375 INJECTION, POWDER, FOR SOLUTION INTRAVENOUS at 12:00

## 2017-02-24 RX ADMIN — VANCOMYCIN HYDROCHLORIDE 500 MG: 500 INJECTION, SOLUTION INTRAVENOUS at 16:33

## 2017-02-24 RX ADMIN — PIPERACILLIN SODIUM,TAZOBACTAM SODIUM 3.38 G: 3; .375 INJECTION, POWDER, FOR SOLUTION INTRAVENOUS at 06:33

## 2017-02-24 RX ADMIN — RUFINAMIDE 800 MG: 200 TABLET, FILM COATED ORAL at 11:54

## 2017-02-24 RX ADMIN — PIPERACILLIN SODIUM,TAZOBACTAM SODIUM 3.38 G: 3; .375 INJECTION, POWDER, FOR SOLUTION INTRAVENOUS at 17:27

## 2017-02-24 RX ADMIN — TOPIRAMATE 200 MG: 100 TABLET, FILM COATED ORAL at 20:12

## 2017-02-24 RX ADMIN — LAMOTRIGINE 50 MG: 25 TABLET ORAL at 11:53

## 2017-02-24 RX ADMIN — VALPROIC ACID 250 MG: 250 SOLUTION ORAL at 17:28

## 2017-02-24 RX ADMIN — LAMOTRIGINE 50 MG: 25 TABLET ORAL at 20:14

## 2017-02-24 RX ADMIN — VALPROIC ACID 250 MG: 250 SOLUTION ORAL at 00:51

## 2017-02-24 RX ADMIN — SODIUM CHLORIDE 75 ML/HR: 0.9 INJECTION, SOLUTION INTRAVENOUS at 12:04

## 2017-02-24 RX ADMIN — PIPERACILLIN SODIUM,TAZOBACTAM SODIUM 3.38 G: 3; .375 INJECTION, POWDER, FOR SOLUTION INTRAVENOUS at 00:50

## 2017-02-24 RX ADMIN — Medication 1 TABLET: at 11:52

## 2017-02-24 RX ADMIN — TOPIRAMATE 200 MG: 100 TABLET, FILM COATED ORAL at 11:55

## 2017-02-24 RX ADMIN — LAMOTRIGINE 200 MG: 100 TABLET ORAL at 11:54

## 2017-02-25 LAB
ANION GAP SERPL CALCULATED.3IONS-SCNC: 10 MMOL/L (ref 4–13)
BUN SERPL-MCNC: 5 MG/DL (ref 5–25)
CALCIUM SERPL-MCNC: 8.3 MG/DL (ref 8.3–10.1)
CHLORIDE SERPL-SCNC: 112 MMOL/L (ref 100–108)
CO2 SERPL-SCNC: 21 MMOL/L (ref 21–32)
CREAT SERPL-MCNC: 0.3 MG/DL (ref 0.6–1.3)
ERYTHROCYTE [DISTWIDTH] IN BLOOD BY AUTOMATED COUNT: 13.8 % (ref 11.6–15.1)
GFR SERPL CREATININE-BSD FRML MDRD: >60 ML/MIN/1.73SQ M
GLUCOSE SERPL-MCNC: 153 MG/DL (ref 65–140)
HCT VFR BLD AUTO: 34.9 % (ref 34.8–46.1)
HGB BLD-MCNC: 11.4 G/DL (ref 11.5–15.4)
MCH RBC QN AUTO: 33 PG (ref 26.8–34.3)
MCHC RBC AUTO-ENTMCNC: 32.7 G/DL (ref 31.4–37.4)
MCV RBC AUTO: 101 FL (ref 82–98)
PLATELET # BLD AUTO: 261 THOUSANDS/UL (ref 149–390)
PMV BLD AUTO: 11.2 FL (ref 8.9–12.7)
POTASSIUM SERPL-SCNC: 3.4 MMOL/L (ref 3.5–5.3)
RBC # BLD AUTO: 3.45 MILLION/UL (ref 3.81–5.12)
SODIUM SERPL-SCNC: 143 MMOL/L (ref 136–145)
WBC # BLD AUTO: 7.66 THOUSAND/UL (ref 4.31–10.16)

## 2017-02-25 PROCEDURE — 94669 MECHANICAL CHEST WALL OSCILL: CPT

## 2017-02-25 PROCEDURE — 94760 N-INVAS EAR/PLS OXIMETRY 1: CPT

## 2017-02-25 PROCEDURE — 80048 BASIC METABOLIC PNL TOTAL CA: CPT | Performed by: INTERNAL MEDICINE

## 2017-02-25 PROCEDURE — 85027 COMPLETE CBC AUTOMATED: CPT | Performed by: INTERNAL MEDICINE

## 2017-02-25 PROCEDURE — 94668 MNPJ CHEST WALL SBSQ: CPT

## 2017-02-25 RX ORDER — POTASSIUM CHLORIDE 20MEQ/15ML
20 LIQUID (ML) ORAL ONCE
Status: COMPLETED | OUTPATIENT
Start: 2017-02-25 | End: 2017-02-25

## 2017-02-25 RX ADMIN — LAMOTRIGINE 50 MG: 25 TABLET ORAL at 21:19

## 2017-02-25 RX ADMIN — RUFINAMIDE 800 MG: 200 TABLET, FILM COATED ORAL at 11:28

## 2017-02-25 RX ADMIN — LAMOTRIGINE 50 MG: 25 TABLET ORAL at 11:28

## 2017-02-25 RX ADMIN — Medication 1 TABLET: at 11:29

## 2017-02-25 RX ADMIN — VALPROIC ACID 250 MG: 250 SOLUTION ORAL at 17:13

## 2017-02-25 RX ADMIN — TOPIRAMATE 200 MG: 100 TABLET, FILM COATED ORAL at 11:28

## 2017-02-25 RX ADMIN — VANCOMYCIN HYDROCHLORIDE 500 MG: 500 INJECTION, SOLUTION INTRAVENOUS at 04:41

## 2017-02-25 RX ADMIN — LAMOTRIGINE 200 MG: 100 TABLET ORAL at 21:18

## 2017-02-25 RX ADMIN — PIPERACILLIN SODIUM,TAZOBACTAM SODIUM 3.38 G: 3; .375 INJECTION, POWDER, FOR SOLUTION INTRAVENOUS at 00:19

## 2017-02-25 RX ADMIN — SODIUM CHLORIDE 75 ML/HR: 0.9 INJECTION, SOLUTION INTRAVENOUS at 04:47

## 2017-02-25 RX ADMIN — PIPERACILLIN SODIUM,TAZOBACTAM SODIUM 3.38 G: 3; .375 INJECTION, POWDER, FOR SOLUTION INTRAVENOUS at 23:41

## 2017-02-25 RX ADMIN — PIPERACILLIN SODIUM,TAZOBACTAM SODIUM 3.38 G: 3; .375 INJECTION, POWDER, FOR SOLUTION INTRAVENOUS at 05:00

## 2017-02-25 RX ADMIN — TOPIRAMATE 200 MG: 100 TABLET, FILM COATED ORAL at 21:19

## 2017-02-25 RX ADMIN — VANCOMYCIN HYDROCHLORIDE 500 MG: 500 INJECTION, SOLUTION INTRAVENOUS at 21:10

## 2017-02-25 RX ADMIN — LAMOTRIGINE 200 MG: 100 TABLET ORAL at 11:29

## 2017-02-25 RX ADMIN — PIPERACILLIN SODIUM,TAZOBACTAM SODIUM 3.38 G: 3; .375 INJECTION, POWDER, FOR SOLUTION INTRAVENOUS at 11:28

## 2017-02-25 RX ADMIN — VALPROIC ACID 250 MG: 250 SOLUTION ORAL at 00:18

## 2017-02-25 RX ADMIN — RUFINAMIDE 800 MG: 200 TABLET, FILM COATED ORAL at 23:42

## 2017-02-25 RX ADMIN — POTASSIUM CHLORIDE 20 MEQ: 20 SOLUTION ORAL at 11:28

## 2017-02-26 LAB
ANION GAP SERPL CALCULATED.3IONS-SCNC: 9 MMOL/L (ref 4–13)
BUN SERPL-MCNC: 12 MG/DL (ref 5–25)
CALCIUM SERPL-MCNC: 8.6 MG/DL (ref 8.3–10.1)
CHLORIDE SERPL-SCNC: 113 MMOL/L (ref 100–108)
CO2 SERPL-SCNC: 22 MMOL/L (ref 21–32)
CREAT SERPL-MCNC: 0.49 MG/DL (ref 0.6–1.3)
ERYTHROCYTE [DISTWIDTH] IN BLOOD BY AUTOMATED COUNT: 13.8 % (ref 11.6–15.1)
GFR SERPL CREATININE-BSD FRML MDRD: >60 ML/MIN/1.73SQ M
GLUCOSE SERPL-MCNC: 157 MG/DL (ref 65–140)
HCT VFR BLD AUTO: 35.4 % (ref 34.8–46.1)
HGB BLD-MCNC: 11.3 G/DL (ref 11.5–15.4)
MCH RBC QN AUTO: 32.8 PG (ref 26.8–34.3)
MCHC RBC AUTO-ENTMCNC: 31.9 G/DL (ref 31.4–37.4)
MCV RBC AUTO: 103 FL (ref 82–98)
PLATELET # BLD AUTO: 271 THOUSANDS/UL (ref 149–390)
PMV BLD AUTO: 11.5 FL (ref 8.9–12.7)
POTASSIUM SERPL-SCNC: 3.8 MMOL/L (ref 3.5–5.3)
RBC # BLD AUTO: 3.45 MILLION/UL (ref 3.81–5.12)
SODIUM SERPL-SCNC: 144 MMOL/L (ref 136–145)
WBC # BLD AUTO: 10.42 THOUSAND/UL (ref 4.31–10.16)

## 2017-02-26 PROCEDURE — 94760 N-INVAS EAR/PLS OXIMETRY 1: CPT

## 2017-02-26 PROCEDURE — 94669 MECHANICAL CHEST WALL OSCILL: CPT

## 2017-02-26 PROCEDURE — 85027 COMPLETE CBC AUTOMATED: CPT | Performed by: INTERNAL MEDICINE

## 2017-02-26 PROCEDURE — 80048 BASIC METABOLIC PNL TOTAL CA: CPT | Performed by: INTERNAL MEDICINE

## 2017-02-26 RX ADMIN — VANCOMYCIN HYDROCHLORIDE 500 MG: 500 INJECTION, SOLUTION INTRAVENOUS at 17:05

## 2017-02-26 RX ADMIN — LAMOTRIGINE 200 MG: 100 TABLET ORAL at 10:16

## 2017-02-26 RX ADMIN — LAMOTRIGINE 50 MG: 25 TABLET ORAL at 21:23

## 2017-02-26 RX ADMIN — PIPERACILLIN SODIUM,TAZOBACTAM SODIUM 3.38 G: 3; .375 INJECTION, POWDER, FOR SOLUTION INTRAVENOUS at 06:39

## 2017-02-26 RX ADMIN — VANCOMYCIN HYDROCHLORIDE 500 MG: 500 INJECTION, SOLUTION INTRAVENOUS at 05:19

## 2017-02-26 RX ADMIN — TOPIRAMATE 200 MG: 100 TABLET, FILM COATED ORAL at 09:53

## 2017-02-26 RX ADMIN — VALPROIC ACID 250 MG: 250 SOLUTION ORAL at 09:53

## 2017-02-26 RX ADMIN — VALPROIC ACID 250 MG: 250 SOLUTION ORAL at 01:13

## 2017-02-26 RX ADMIN — ACETAMINOPHEN 650 MG: 325 TABLET, FILM COATED ORAL at 01:20

## 2017-02-26 RX ADMIN — PIPERACILLIN SODIUM,TAZOBACTAM SODIUM 3.38 G: 3; .375 INJECTION, POWDER, FOR SOLUTION INTRAVENOUS at 13:34

## 2017-02-26 RX ADMIN — VALPROIC ACID 250 MG: 250 SOLUTION ORAL at 17:42

## 2017-02-26 RX ADMIN — RUFINAMIDE 800 MG: 200 TABLET, FILM COATED ORAL at 10:17

## 2017-02-26 RX ADMIN — PIPERACILLIN SODIUM,TAZOBACTAM SODIUM 3.38 G: 3; .375 INJECTION, POWDER, FOR SOLUTION INTRAVENOUS at 18:09

## 2017-02-26 RX ADMIN — LAMOTRIGINE 200 MG: 100 TABLET ORAL at 21:22

## 2017-02-26 RX ADMIN — PIPERACILLIN SODIUM,TAZOBACTAM SODIUM 3.38 G: 3; .375 INJECTION, POWDER, FOR SOLUTION INTRAVENOUS at 23:04

## 2017-02-26 RX ADMIN — Medication 1 TABLET: at 09:51

## 2017-02-26 RX ADMIN — LAMOTRIGINE 50 MG: 25 TABLET ORAL at 09:52

## 2017-02-26 RX ADMIN — SODIUM CHLORIDE 75 ML/HR: 0.9 INJECTION, SOLUTION INTRAVENOUS at 13:46

## 2017-02-26 RX ADMIN — ENOXAPARIN SODIUM 40 MG: 40 INJECTION SUBCUTANEOUS at 10:13

## 2017-02-26 RX ADMIN — TOPIRAMATE 200 MG: 100 TABLET, FILM COATED ORAL at 21:22

## 2017-02-26 RX ADMIN — RUFINAMIDE 800 MG: 200 TABLET, FILM COATED ORAL at 22:41

## 2017-02-27 ENCOUNTER — HOSPITAL ENCOUNTER (EMERGENCY)
Facility: HOSPITAL | Age: 37
Discharge: NURSING, REHAB OR LONG-TERM CARE | End: 2017-02-28
Attending: EMERGENCY MEDICINE | Admitting: EMERGENCY MEDICINE
Payer: MEDICARE

## 2017-02-27 VITALS
BODY MASS INDEX: 17.67 KG/M2 | SYSTOLIC BLOOD PRESSURE: 104 MMHG | WEIGHT: 90 LBS | OXYGEN SATURATION: 92 % | HEART RATE: 100 BPM | TEMPERATURE: 98.2 F | RESPIRATION RATE: 20 BRPM | DIASTOLIC BLOOD PRESSURE: 63 MMHG | HEIGHT: 60 IN

## 2017-02-27 DIAGNOSIS — G40.919 BREAKTHROUGH SEIZURE (HCC): Primary | ICD-10-CM

## 2017-02-27 PROBLEM — J18.9 PNEUMONIA: Status: RESOLVED | Noted: 2017-02-22 | Resolved: 2017-02-27

## 2017-02-27 PROBLEM — G93.40 ACUTE ENCEPHALOPATHY: Status: RESOLVED | Noted: 2017-02-23 | Resolved: 2017-02-27

## 2017-02-27 PROBLEM — A41.9 SEPSIS (HCC): Status: RESOLVED | Noted: 2017-02-22 | Resolved: 2017-02-27

## 2017-02-27 LAB
ANION GAP SERPL CALCULATED.3IONS-SCNC: 8 MMOL/L (ref 4–13)
BACTERIA BLD CULT: NORMAL
BACTERIA BLD CULT: NORMAL
BASOPHILS # BLD MANUAL: 0 THOUSAND/UL (ref 0–0.1)
BASOPHILS NFR MAR MANUAL: 0 % (ref 0–1)
BUN SERPL-MCNC: 9 MG/DL (ref 5–25)
CALCIUM SERPL-MCNC: 9.3 MG/DL (ref 8.3–10.1)
CHLORIDE SERPL-SCNC: 105 MMOL/L (ref 100–108)
CO2 SERPL-SCNC: 27 MMOL/L (ref 21–32)
CREAT SERPL-MCNC: 0.48 MG/DL (ref 0.6–1.3)
EOSINOPHIL # BLD MANUAL: 0 THOUSAND/UL (ref 0–0.4)
EOSINOPHIL NFR BLD MANUAL: 0 % (ref 0–6)
ERYTHROCYTE [DISTWIDTH] IN BLOOD BY AUTOMATED COUNT: 13.7 % (ref 11.6–15.1)
GFR SERPL CREATININE-BSD FRML MDRD: >60 ML/MIN/1.73SQ M
GLUCOSE SERPL-MCNC: 131 MG/DL (ref 65–140)
HCT VFR BLD AUTO: 38.2 % (ref 34.8–46.1)
HGB BLD-MCNC: 12.3 G/DL (ref 11.5–15.4)
HOLD SPECIMEN: NORMAL
LYMPHOCYTES # BLD AUTO: 2.97 THOUSAND/UL (ref 0.6–4.47)
LYMPHOCYTES # BLD AUTO: 24 % (ref 14–44)
MCH RBC QN AUTO: 33.3 PG (ref 26.8–34.3)
MCHC RBC AUTO-ENTMCNC: 32.2 G/DL (ref 31.4–37.4)
MCV RBC AUTO: 104 FL (ref 82–98)
METAMYELOCYTES NFR BLD MANUAL: 1 % (ref 0–1)
MONOCYTES # BLD AUTO: 1.86 THOUSAND/UL (ref 0–1.22)
MONOCYTES NFR BLD: 15 % (ref 4–12)
NEUTROPHILS # BLD MANUAL: 7.05 THOUSAND/UL (ref 1.85–7.62)
NEUTS SEG NFR BLD AUTO: 57 % (ref 43–75)
PLATELET # BLD AUTO: 312 THOUSANDS/UL (ref 149–390)
PLATELET BLD QL SMEAR: ADEQUATE
PMV BLD AUTO: 10.5 FL (ref 8.9–12.7)
POTASSIUM SERPL-SCNC: 4.2 MMOL/L (ref 3.5–5.3)
RBC # BLD AUTO: 3.69 MILLION/UL (ref 3.81–5.12)
SODIUM SERPL-SCNC: 140 MMOL/L (ref 136–145)
TOTAL CELLS COUNTED SPEC: 100
VALPROATE SERPL-MCNC: 77 UG/ML (ref 50–100)
VARIANT LYMPHS # BLD AUTO: 3 %
WBC # BLD AUTO: 12.37 THOUSAND/UL (ref 4.31–10.16)

## 2017-02-27 PROCEDURE — 80048 BASIC METABOLIC PNL TOTAL CA: CPT | Performed by: EMERGENCY MEDICINE

## 2017-02-27 PROCEDURE — 80164 ASSAY DIPROPYLACETIC ACD TOT: CPT | Performed by: EMERGENCY MEDICINE

## 2017-02-27 PROCEDURE — 85007 BL SMEAR W/DIFF WBC COUNT: CPT | Performed by: EMERGENCY MEDICINE

## 2017-02-27 PROCEDURE — 36415 COLL VENOUS BLD VENIPUNCTURE: CPT | Performed by: EMERGENCY MEDICINE

## 2017-02-27 PROCEDURE — 85027 COMPLETE CBC AUTOMATED: CPT | Performed by: EMERGENCY MEDICINE

## 2017-02-27 PROCEDURE — 96365 THER/PROPH/DIAG IV INF INIT: CPT

## 2017-02-27 PROCEDURE — 96375 TX/PRO/DX INJ NEW DRUG ADDON: CPT

## 2017-02-27 RX ORDER — LACTOSE-REDUCED FOOD/FIBER
300 LIQUID (ML) ORAL 4 TIMES DAILY
Qty: 36000 ML | Refills: 0 | Status: SHIPPED | OUTPATIENT
Start: 2017-02-27 | End: 2017-03-29

## 2017-02-27 RX ORDER — TOPIRAMATE 100 MG/1
200 TABLET, FILM COATED ORAL 2 TIMES DAILY
Status: DISCONTINUED | OUTPATIENT
Start: 2017-02-27 | End: 2017-02-28 | Stop reason: HOSPADM

## 2017-02-27 RX ORDER — LEVOFLOXACIN 500 MG/1
500 TABLET, FILM COATED ORAL DAILY
Qty: 5 TABLET | Refills: 0 | Status: SHIPPED | OUTPATIENT
Start: 2017-02-27 | End: 2017-03-04

## 2017-02-27 RX ORDER — VALPROIC ACID 250 MG/1
250 CAPSULE, LIQUID FILLED ORAL ONCE
Status: DISCONTINUED | OUTPATIENT
Start: 2017-02-27 | End: 2017-02-27

## 2017-02-27 RX ORDER — LAMOTRIGINE 25 MG/1
50 TABLET ORAL ONCE
Status: COMPLETED | OUTPATIENT
Start: 2017-02-27 | End: 2017-02-27

## 2017-02-27 RX ORDER — DIVALPROEX SODIUM 125 MG/1
250 CAPSULE, COATED PELLETS ORAL ONCE
Status: COMPLETED | OUTPATIENT
Start: 2017-02-27 | End: 2017-02-27

## 2017-02-27 RX ORDER — TOPIRAMATE 100 MG/1
200 TABLET, FILM COATED ORAL 2 TIMES DAILY
Status: DISCONTINUED | OUTPATIENT
Start: 2017-02-27 | End: 2017-02-27

## 2017-02-27 RX ORDER — DIAZEPAM 5 MG/ML
5 INJECTION, SOLUTION INTRAMUSCULAR; INTRAVENOUS ONCE
Status: COMPLETED | OUTPATIENT
Start: 2017-02-27 | End: 2017-02-27

## 2017-02-27 RX ORDER — DIAZEPAM 5 MG/ML
INJECTION, SOLUTION INTRAMUSCULAR; INTRAVENOUS
Status: COMPLETED
Start: 2017-02-27 | End: 2017-02-27

## 2017-02-27 RX ORDER — TOPIRAMATE 100 MG/1
200 TABLET, FILM COATED ORAL 2 TIMES DAILY
Status: DISCONTINUED | OUTPATIENT
Start: 2017-02-27 | End: 2017-02-27 | Stop reason: CLARIF

## 2017-02-27 RX ORDER — PHENOBARBITAL SODIUM 130 MG/ML
INJECTION INTRAMUSCULAR
Status: DISCONTINUED
Start: 2017-02-27 | End: 2017-02-28 | Stop reason: HOSPADM

## 2017-02-27 RX ADMIN — TOPIRAMATE 200 MG: 100 TABLET, FILM COATED ORAL at 08:22

## 2017-02-27 RX ADMIN — LAMOTRIGINE 50 MG: 25 TABLET ORAL at 22:31

## 2017-02-27 RX ADMIN — DIAZEPAM 5 MG: 5 INJECTION, SOLUTION INTRAMUSCULAR; INTRAVENOUS at 21:00

## 2017-02-27 RX ADMIN — DIVALPROEX SODIUM 250 MG: 125 CAPSULE ORAL at 22:32

## 2017-02-27 RX ADMIN — LAMOTRIGINE 200 MG: 100 TABLET ORAL at 08:22

## 2017-02-27 RX ADMIN — PIPERACILLIN SODIUM,TAZOBACTAM SODIUM 3.38 G: 3; .375 INJECTION, POWDER, FOR SOLUTION INTRAVENOUS at 13:55

## 2017-02-27 RX ADMIN — PIPERACILLIN SODIUM,TAZOBACTAM SODIUM 3.38 G: 3; .375 INJECTION, POWDER, FOR SOLUTION INTRAVENOUS at 08:19

## 2017-02-27 RX ADMIN — VANCOMYCIN HYDROCHLORIDE 500 MG: 500 INJECTION, SOLUTION INTRAVENOUS at 04:58

## 2017-02-27 RX ADMIN — SODIUM CHLORIDE 75 ML/HR: 0.9 INJECTION, SOLUTION INTRAVENOUS at 03:35

## 2017-02-27 RX ADMIN — LAMOTRIGINE 50 MG: 25 TABLET ORAL at 08:21

## 2017-02-27 RX ADMIN — VALPROIC ACID 250 MG: 250 SOLUTION ORAL at 08:22

## 2017-02-27 RX ADMIN — Medication 1 TABLET: at 08:28

## 2017-02-27 RX ADMIN — PHENOBARBITAL SODIUM 410 MG: 130 INJECTION INTRAMUSCULAR; INTRAVENOUS at 23:13

## 2017-02-27 RX ADMIN — RUFINAMIDE 800 MG: 200 TABLET, FILM COATED ORAL at 13:48

## 2017-02-27 RX ADMIN — TOPIRAMATE 200 MG: 100 TABLET ORAL at 22:32

## 2017-02-27 RX ADMIN — VALPROIC ACID 250 MG: 250 SOLUTION ORAL at 00:14

## 2017-02-28 VITALS
TEMPERATURE: 99.2 F | OXYGEN SATURATION: 98 % | BODY MASS INDEX: 17.65 KG/M2 | HEART RATE: 78 BPM | DIASTOLIC BLOOD PRESSURE: 51 MMHG | RESPIRATION RATE: 18 BRPM | SYSTOLIC BLOOD PRESSURE: 92 MMHG | WEIGHT: 90.38 LBS

## 2017-02-28 PROCEDURE — 99284 EMERGENCY DEPT VISIT MOD MDM: CPT

## 2017-04-07 ENCOUNTER — ALLSCRIPTS OFFICE VISIT (OUTPATIENT)
Dept: OTHER | Facility: OTHER | Age: 37
End: 2017-04-07

## 2017-04-07 DIAGNOSIS — G40.919 INTRACTABLE EPILEPSY WITHOUT STATUS EPILEPTICUS (HCC): ICD-10-CM

## 2017-04-07 DIAGNOSIS — G40.812 LENNOX-GASTAUT SYNDROME, NOT INTRACTABLE, WITHOUT STATUS EPILEPTICUS (HCC): ICD-10-CM

## 2017-04-26 ENCOUNTER — HOSPITAL ENCOUNTER (EMERGENCY)
Facility: HOSPITAL | Age: 37
Discharge: NURSING, REHAB OR LONG-TERM CARE | End: 2017-04-26
Attending: EMERGENCY MEDICINE
Payer: MEDICARE

## 2017-04-26 DIAGNOSIS — K94.23 PEG TUBE MALFUNCTION (HCC): Primary | ICD-10-CM

## 2017-04-26 PROCEDURE — 99282 EMERGENCY DEPT VISIT SF MDM: CPT

## 2017-06-05 ENCOUNTER — ALLSCRIPTS OFFICE VISIT (OUTPATIENT)
Dept: OTHER | Facility: OTHER | Age: 37
End: 2017-06-05

## 2017-06-08 ENCOUNTER — HOSPITAL ENCOUNTER (EMERGENCY)
Facility: HOSPITAL | Age: 37
Discharge: HOME | End: 2017-06-09
Attending: EMERGENCY MEDICINE | Admitting: EMERGENCY MEDICINE
Payer: MEDICARE

## 2017-06-08 DIAGNOSIS — R11.10 VOMITING: Primary | ICD-10-CM

## 2017-06-09 ENCOUNTER — APPOINTMENT (EMERGENCY)
Dept: CT IMAGING | Facility: HOSPITAL | Age: 37
End: 2017-06-09
Payer: MEDICARE

## 2017-06-09 VITALS
DIASTOLIC BLOOD PRESSURE: 59 MMHG | TEMPERATURE: 98 F | SYSTOLIC BLOOD PRESSURE: 92 MMHG | OXYGEN SATURATION: 98 % | BODY MASS INDEX: 18.69 KG/M2 | HEART RATE: 81 BPM | WEIGHT: 95.68 LBS | RESPIRATION RATE: 16 BRPM

## 2017-06-09 LAB
ALBUMIN SERPL BCP-MCNC: 3.3 G/DL (ref 3.5–5)
ALP SERPL-CCNC: 107 U/L (ref 46–116)
ALT SERPL W P-5'-P-CCNC: 49 U/L (ref 12–78)
ANION GAP SERPL CALCULATED.3IONS-SCNC: 8 MMOL/L (ref 4–13)
AST SERPL W P-5'-P-CCNC: 29 U/L (ref 5–45)
BASOPHILS # BLD AUTO: 0.03 THOUSANDS/ΜL (ref 0–0.1)
BASOPHILS NFR BLD AUTO: 0 % (ref 0–1)
BILIRUB SERPL-MCNC: 0.2 MG/DL (ref 0.2–1)
BUN SERPL-MCNC: 10 MG/DL (ref 5–25)
CALCIUM SERPL-MCNC: 9.2 MG/DL (ref 8.3–10.1)
CHLORIDE SERPL-SCNC: 104 MMOL/L (ref 100–108)
CO2 SERPL-SCNC: 28 MMOL/L (ref 21–32)
CREAT SERPL-MCNC: 0.62 MG/DL (ref 0.6–1.3)
EOSINOPHIL # BLD AUTO: 0.01 THOUSAND/ΜL (ref 0–0.61)
EOSINOPHIL NFR BLD AUTO: 0 % (ref 0–6)
ERYTHROCYTE [DISTWIDTH] IN BLOOD BY AUTOMATED COUNT: 13.4 % (ref 11.6–15.1)
GFR SERPL CREATININE-BSD FRML MDRD: >60 ML/MIN/1.73SQ M
GLUCOSE SERPL-MCNC: 135 MG/DL (ref 65–140)
HCT VFR BLD AUTO: 39.5 % (ref 34.8–46.1)
HGB BLD-MCNC: 12.8 G/DL (ref 11.5–15.4)
LIPASE SERPL-CCNC: 176 U/L (ref 73–393)
LYMPHOCYTES # BLD AUTO: 1.84 THOUSANDS/ΜL (ref 0.6–4.47)
LYMPHOCYTES NFR BLD AUTO: 11 % (ref 14–44)
MCH RBC QN AUTO: 32.2 PG (ref 26.8–34.3)
MCHC RBC AUTO-ENTMCNC: 32.4 G/DL (ref 31.4–37.4)
MCV RBC AUTO: 100 FL (ref 82–98)
MONOCYTES # BLD AUTO: 0.67 THOUSAND/ΜL (ref 0.17–1.22)
MONOCYTES NFR BLD AUTO: 4 % (ref 4–12)
NEUTROPHILS # BLD AUTO: 13.58 THOUSANDS/ΜL (ref 1.85–7.62)
NEUTS SEG NFR BLD AUTO: 85 % (ref 43–75)
PLATELET # BLD AUTO: 288 THOUSANDS/UL (ref 149–390)
PMV BLD AUTO: 10.4 FL (ref 8.9–12.7)
POTASSIUM SERPL-SCNC: 3.7 MMOL/L (ref 3.5–5.3)
PROT SERPL-MCNC: 7.6 G/DL (ref 6.4–8.2)
RBC # BLD AUTO: 3.97 MILLION/UL (ref 3.81–5.12)
SODIUM SERPL-SCNC: 140 MMOL/L (ref 136–145)
WBC # BLD AUTO: 16.13 THOUSAND/UL (ref 4.31–10.16)

## 2017-06-09 PROCEDURE — 99284 EMERGENCY DEPT VISIT MOD MDM: CPT

## 2017-06-09 PROCEDURE — 83690 ASSAY OF LIPASE: CPT | Performed by: EMERGENCY MEDICINE

## 2017-06-09 PROCEDURE — 74177 CT ABD & PELVIS W/CONTRAST: CPT

## 2017-06-09 PROCEDURE — 96361 HYDRATE IV INFUSION ADD-ON: CPT

## 2017-06-09 PROCEDURE — 80053 COMPREHEN METABOLIC PANEL: CPT | Performed by: EMERGENCY MEDICINE

## 2017-06-09 PROCEDURE — 96374 THER/PROPH/DIAG INJ IV PUSH: CPT

## 2017-06-09 PROCEDURE — 85025 COMPLETE CBC W/AUTO DIFF WBC: CPT | Performed by: EMERGENCY MEDICINE

## 2017-06-09 PROCEDURE — 96360 HYDRATION IV INFUSION INIT: CPT

## 2017-06-09 PROCEDURE — 36415 COLL VENOUS BLD VENIPUNCTURE: CPT | Performed by: EMERGENCY MEDICINE

## 2017-06-09 RX ORDER — ONDANSETRON 4 MG/1
4 TABLET, ORALLY DISINTEGRATING ORAL EVERY 8 HOURS PRN
Qty: 20 TABLET | Refills: 0 | Status: SHIPPED | OUTPATIENT
Start: 2017-06-09 | End: 2017-12-01

## 2017-06-09 RX ORDER — LEVOCARNITINE 1 G/10ML
100 SOLUTION ORAL 3 TIMES DAILY
COMMUNITY
End: 2017-10-26 | Stop reason: HOSPADM

## 2017-06-09 RX ORDER — ONDANSETRON 2 MG/ML
4 INJECTION INTRAMUSCULAR; INTRAVENOUS ONCE
Status: COMPLETED | OUTPATIENT
Start: 2017-06-09 | End: 2017-06-09

## 2017-06-09 RX ADMIN — SODIUM CHLORIDE 1000 ML: 0.9 INJECTION, SOLUTION INTRAVENOUS at 00:51

## 2017-06-09 RX ADMIN — IOHEXOL 100 ML: 350 INJECTION, SOLUTION INTRAVENOUS at 01:54

## 2017-06-09 RX ADMIN — ONDANSETRON 4 MG: 2 INJECTION INTRAMUSCULAR; INTRAVENOUS at 00:44

## 2017-07-05 ENCOUNTER — HOSPITAL ENCOUNTER (INPATIENT)
Facility: HOSPITAL | Age: 37
LOS: 4 days | Discharge: NURSING, REHAB OR LONG-TERM CARE | DRG: 871 | End: 2017-07-10
Attending: EMERGENCY MEDICINE | Admitting: FAMILY MEDICINE
Payer: MEDICARE

## 2017-07-05 DIAGNOSIS — R13.10 DYSPHAGIA: ICD-10-CM

## 2017-07-05 DIAGNOSIS — R50.9 FEVER: Primary | ICD-10-CM

## 2017-07-05 PROCEDURE — 85610 PROTHROMBIN TIME: CPT | Performed by: EMERGENCY MEDICINE

## 2017-07-05 PROCEDURE — 85027 COMPLETE CBC AUTOMATED: CPT | Performed by: EMERGENCY MEDICINE

## 2017-07-05 PROCEDURE — 36415 COLL VENOUS BLD VENIPUNCTURE: CPT | Performed by: EMERGENCY MEDICINE

## 2017-07-05 PROCEDURE — 85007 BL SMEAR W/DIFF WBC COUNT: CPT | Performed by: EMERGENCY MEDICINE

## 2017-07-05 PROCEDURE — 84484 ASSAY OF TROPONIN QUANT: CPT

## 2017-07-05 PROCEDURE — 82140 ASSAY OF AMMONIA: CPT | Performed by: EMERGENCY MEDICINE

## 2017-07-05 PROCEDURE — 80053 COMPREHEN METABOLIC PANEL: CPT | Performed by: EMERGENCY MEDICINE

## 2017-07-05 PROCEDURE — 83690 ASSAY OF LIPASE: CPT | Performed by: EMERGENCY MEDICINE

## 2017-07-05 PROCEDURE — 87040 BLOOD CULTURE FOR BACTERIA: CPT | Performed by: EMERGENCY MEDICINE

## 2017-07-05 PROCEDURE — 83735 ASSAY OF MAGNESIUM: CPT | Performed by: EMERGENCY MEDICINE

## 2017-07-05 PROCEDURE — 80164 ASSAY DIPROPYLACETIC ACD TOT: CPT | Performed by: EMERGENCY MEDICINE

## 2017-07-05 PROCEDURE — 85730 THROMBOPLASTIN TIME PARTIAL: CPT | Performed by: EMERGENCY MEDICINE

## 2017-07-05 PROCEDURE — 83605 ASSAY OF LACTIC ACID: CPT | Performed by: EMERGENCY MEDICINE

## 2017-07-06 ENCOUNTER — APPOINTMENT (EMERGENCY)
Dept: CT IMAGING | Facility: HOSPITAL | Age: 37
DRG: 871 | End: 2017-07-06
Payer: MEDICARE

## 2017-07-06 PROBLEM — G40.909 SEIZURE DISORDER (HCC): Status: ACTIVE | Noted: 2017-07-06

## 2017-07-06 PROBLEM — G40.812 LENNOX-GASTAUT SYNDROME (HCC): Status: ACTIVE | Noted: 2017-07-06

## 2017-07-06 PROBLEM — E87.20 LACTIC ACIDOSIS: Status: ACTIVE | Noted: 2017-07-06

## 2017-07-06 PROBLEM — E87.2 LACTIC ACIDOSIS: Status: ACTIVE | Noted: 2017-07-06

## 2017-07-06 LAB
ALBUMIN SERPL BCP-MCNC: 3 G/DL (ref 3.5–5)
ALBUMIN SERPL BCP-MCNC: 3.3 G/DL (ref 3.5–5)
ALP SERPL-CCNC: 114 U/L (ref 46–116)
ALP SERPL-CCNC: 98 U/L (ref 46–116)
ALT SERPL W P-5'-P-CCNC: 37 U/L (ref 12–78)
ALT SERPL W P-5'-P-CCNC: 41 U/L (ref 12–78)
AMMONIA PLAS-SCNC: 12 UMOL/L (ref 11–35)
AMMONIA PLAS-SCNC: 22 UMOL/L (ref 11–35)
ANION GAP SERPL CALCULATED.3IONS-SCNC: 7 MMOL/L (ref 4–13)
ANION GAP SERPL CALCULATED.3IONS-SCNC: 7 MMOL/L (ref 4–13)
APTT PPP: 32 SECONDS (ref 23–35)
AST SERPL W P-5'-P-CCNC: 26 U/L (ref 5–45)
AST SERPL W P-5'-P-CCNC: 28 U/L (ref 5–45)
BASOPHILS # BLD MANUAL: 0 THOUSAND/UL (ref 0–0.1)
BASOPHILS NFR MAR MANUAL: 0 % (ref 0–1)
BILIRUB SERPL-MCNC: 0.2 MG/DL (ref 0.2–1)
BILIRUB SERPL-MCNC: 0.2 MG/DL (ref 0.2–1)
BILIRUB UR QL STRIP: NEGATIVE
BUN SERPL-MCNC: 10 MG/DL (ref 5–25)
BUN SERPL-MCNC: 6 MG/DL (ref 5–25)
CALCIUM SERPL-MCNC: 8.6 MG/DL (ref 8.3–10.1)
CALCIUM SERPL-MCNC: 8.9 MG/DL (ref 8.3–10.1)
CHLORIDE SERPL-SCNC: 102 MMOL/L (ref 100–108)
CHLORIDE SERPL-SCNC: 106 MMOL/L (ref 100–108)
CLARITY UR: NORMAL
CO2 SERPL-SCNC: 24 MMOL/L (ref 21–32)
CO2 SERPL-SCNC: 27 MMOL/L (ref 21–32)
COLOR UR: YELLOW
CREAT SERPL-MCNC: 0.47 MG/DL (ref 0.6–1.3)
CREAT SERPL-MCNC: 0.62 MG/DL (ref 0.6–1.3)
EOSINOPHIL # BLD MANUAL: 0 THOUSAND/UL (ref 0–0.4)
EOSINOPHIL NFR BLD MANUAL: 0 % (ref 0–6)
ERYTHROCYTE [DISTWIDTH] IN BLOOD BY AUTOMATED COUNT: 14 % (ref 11.6–15.1)
ERYTHROCYTE [DISTWIDTH] IN BLOOD BY AUTOMATED COUNT: 14.2 % (ref 11.6–15.1)
GFR SERPL CREATININE-BSD FRML MDRD: >60 ML/MIN/1.73SQ M
GFR SERPL CREATININE-BSD FRML MDRD: >60 ML/MIN/1.73SQ M
GLUCOSE SERPL-MCNC: 144 MG/DL (ref 65–140)
GLUCOSE SERPL-MCNC: 97 MG/DL (ref 65–140)
GLUCOSE UR STRIP-MCNC: NEGATIVE MG/DL
HCT VFR BLD AUTO: 37.7 % (ref 34.8–46.1)
HCT VFR BLD AUTO: 40.8 % (ref 34.8–46.1)
HGB BLD-MCNC: 12.1 G/DL (ref 11.5–15.4)
HGB BLD-MCNC: 13.2 G/DL (ref 11.5–15.4)
HGB UR QL STRIP.AUTO: NEGATIVE
INR PPP: 1.06 (ref 0.86–1.16)
KETONES UR STRIP-MCNC: NEGATIVE MG/DL
LACTATE SERPL-SCNC: 3.1 MMOL/L (ref 0.5–2)
LEUKOCYTE ESTERASE UR QL STRIP: NEGATIVE
LIPASE SERPL-CCNC: 118 U/L (ref 73–393)
LYMPHOCYTES # BLD AUTO: 1.24 THOUSAND/UL (ref 0.6–4.47)
LYMPHOCYTES # BLD AUTO: 6 % (ref 14–44)
MAGNESIUM SERPL-MCNC: 1.9 MG/DL (ref 1.6–2.6)
MAGNESIUM SERPL-MCNC: 2 MG/DL (ref 1.6–2.6)
MCH RBC QN AUTO: 31.9 PG (ref 26.8–34.3)
MCH RBC QN AUTO: 32 PG (ref 26.8–34.3)
MCHC RBC AUTO-ENTMCNC: 32.1 G/DL (ref 31.4–37.4)
MCHC RBC AUTO-ENTMCNC: 32.4 G/DL (ref 31.4–37.4)
MCV RBC AUTO: 100 FL (ref 82–98)
MCV RBC AUTO: 99 FL (ref 82–98)
METAMYELOCYTES NFR BLD MANUAL: 1 % (ref 0–1)
MONOCYTES # BLD AUTO: 1.66 THOUSAND/UL (ref 0–1.22)
MONOCYTES NFR BLD: 8 % (ref 4–12)
MYELOCYTES NFR BLD MANUAL: 2 % (ref 0–1)
NEUTROPHILS # BLD MANUAL: 17.01 THOUSAND/UL (ref 1.85–7.62)
NEUTS BAND NFR BLD MANUAL: 9 % (ref 0–8)
NEUTS SEG NFR BLD AUTO: 73 % (ref 43–75)
NITRITE UR QL STRIP: NEGATIVE
PH UR STRIP.AUTO: 7.5 [PH] (ref 4.5–8)
PLATELET # BLD AUTO: 216 THOUSANDS/UL (ref 149–390)
PLATELET # BLD AUTO: 230 THOUSANDS/UL (ref 149–390)
PLATELET # BLD AUTO: 250 THOUSANDS/UL (ref 149–390)
PLATELET BLD QL SMEAR: ADEQUATE
PMV BLD AUTO: 10.6 FL (ref 8.9–12.7)
PMV BLD AUTO: 10.9 FL (ref 8.9–12.7)
PMV BLD AUTO: 11.4 FL (ref 8.9–12.7)
POTASSIUM SERPL-SCNC: 3.7 MMOL/L (ref 3.5–5.3)
POTASSIUM SERPL-SCNC: 3.7 MMOL/L (ref 3.5–5.3)
PROT SERPL-MCNC: 7.3 G/DL (ref 6.4–8.2)
PROT SERPL-MCNC: 8.2 G/DL (ref 6.4–8.2)
PROT UR STRIP-MCNC: NEGATIVE MG/DL
PROTHROMBIN TIME: 13.7 SECONDS (ref 12.1–14.4)
RBC # BLD AUTO: 3.79 MILLION/UL (ref 3.81–5.12)
RBC # BLD AUTO: 4.13 MILLION/UL (ref 3.81–5.12)
SODIUM SERPL-SCNC: 136 MMOL/L (ref 136–145)
SODIUM SERPL-SCNC: 137 MMOL/L (ref 136–145)
SP GR UR STRIP.AUTO: 1.01 (ref 1–1.03)
TOTAL CELLS COUNTED SPEC: 100
TROPONIN I SERPL-MCNC: <0.02 NG/ML
UROBILINOGEN UR QL STRIP.AUTO: 0.2 E.U./DL
VALPROATE SERPL-MCNC: 45 UG/ML (ref 50–100)
VARIANT LYMPHS # BLD AUTO: 1 %
WBC # BLD AUTO: 16.94 THOUSAND/UL (ref 4.31–10.16)
WBC # BLD AUTO: 20.74 THOUSAND/UL (ref 4.31–10.16)

## 2017-07-06 PROCEDURE — 83735 ASSAY OF MAGNESIUM: CPT | Performed by: FAMILY MEDICINE

## 2017-07-06 PROCEDURE — 82140 ASSAY OF AMMONIA: CPT | Performed by: FAMILY MEDICINE

## 2017-07-06 PROCEDURE — 71260 CT THORAX DX C+: CPT

## 2017-07-06 PROCEDURE — 85049 AUTOMATED PLATELET COUNT: CPT | Performed by: FAMILY MEDICINE

## 2017-07-06 PROCEDURE — 74177 CT ABD & PELVIS W/CONTRAST: CPT

## 2017-07-06 PROCEDURE — 99285 EMERGENCY DEPT VISIT HI MDM: CPT

## 2017-07-06 PROCEDURE — 96375 TX/PRO/DX INJ NEW DRUG ADDON: CPT

## 2017-07-06 PROCEDURE — 85027 COMPLETE CBC AUTOMATED: CPT | Performed by: FAMILY MEDICINE

## 2017-07-06 PROCEDURE — 96365 THER/PROPH/DIAG IV INF INIT: CPT

## 2017-07-06 PROCEDURE — 81003 URINALYSIS AUTO W/O SCOPE: CPT | Performed by: EMERGENCY MEDICINE

## 2017-07-06 PROCEDURE — 80053 COMPREHEN METABOLIC PANEL: CPT | Performed by: FAMILY MEDICINE

## 2017-07-06 RX ORDER — LEVOCARNITINE 1 G/10ML
100 SOLUTION ORAL
Status: DISCONTINUED | OUTPATIENT
Start: 2017-07-06 | End: 2017-07-06

## 2017-07-06 RX ORDER — VALPROIC ACID 250 MG/5ML
250 SOLUTION ORAL ONCE
Status: COMPLETED | OUTPATIENT
Start: 2017-07-06 | End: 2017-07-06

## 2017-07-06 RX ORDER — 0.9 % SODIUM CHLORIDE 0.9 %
3 VIAL (ML) INJECTION AS NEEDED
Status: DISCONTINUED | OUTPATIENT
Start: 2017-07-06 | End: 2017-07-06 | Stop reason: HOSPADM

## 2017-07-06 RX ORDER — VALPROIC ACID 250 MG/5ML
250 SOLUTION ORAL EVERY 8 HOURS
Status: DISCONTINUED | OUTPATIENT
Start: 2017-07-06 | End: 2017-07-06 | Stop reason: SDUPTHER

## 2017-07-06 RX ORDER — RUFINAMIDE 200 MG/1
1600 TABLET, FILM COATED ORAL 2 TIMES DAILY
Status: DISCONTINUED | OUTPATIENT
Start: 2017-07-06 | End: 2017-07-10 | Stop reason: HOSPADM

## 2017-07-06 RX ORDER — SODIUM CHLORIDE 9 MG/ML
100 INJECTION, SOLUTION INTRAVENOUS CONTINUOUS
Status: DISCONTINUED | OUTPATIENT
Start: 2017-07-06 | End: 2017-07-09

## 2017-07-06 RX ORDER — VALPROIC ACID 250 MG/5ML
250 SOLUTION ORAL ONCE
Status: DISCONTINUED | OUTPATIENT
Start: 2017-07-06 | End: 2017-07-06

## 2017-07-06 RX ORDER — ACETAMINOPHEN 325 MG/1
650 TABLET ORAL EVERY 6 HOURS PRN
Status: DISCONTINUED | OUTPATIENT
Start: 2017-07-06 | End: 2017-07-10 | Stop reason: HOSPADM

## 2017-07-06 RX ORDER — CLOBAZAM 20 MG/1
20 TABLET ORAL 2 TIMES DAILY
Status: DISCONTINUED | OUTPATIENT
Start: 2017-07-06 | End: 2017-07-10 | Stop reason: HOSPADM

## 2017-07-06 RX ORDER — LACTULOSE 20 G/30ML
20 SOLUTION ORAL 2 TIMES DAILY
Status: DISCONTINUED | OUTPATIENT
Start: 2017-07-06 | End: 2017-07-10 | Stop reason: HOSPADM

## 2017-07-06 RX ORDER — LEVOCARNITINE 1 G/10ML
330 SOLUTION ORAL EVERY 8 HOURS SCHEDULED
Status: DISCONTINUED | OUTPATIENT
Start: 2017-07-06 | End: 2017-07-10 | Stop reason: HOSPADM

## 2017-07-06 RX ORDER — LEVOCARNITINE 1 G/10ML
100 SOLUTION ORAL DAILY
Status: DISCONTINUED | OUTPATIENT
Start: 2017-07-06 | End: 2017-07-06

## 2017-07-06 RX ORDER — LACTULOSE 20 G/30ML
20 SOLUTION ORAL 4 TIMES DAILY
Status: DISCONTINUED | OUTPATIENT
Start: 2017-07-06 | End: 2017-07-06

## 2017-07-06 RX ORDER — LEVOCARNITINE 1 G/10ML
330 SOLUTION ORAL EVERY 8 HOURS SCHEDULED
Status: DISCONTINUED | OUTPATIENT
Start: 2017-07-06 | End: 2017-07-06

## 2017-07-06 RX ORDER — BISACODYL 10 MG
10 SUPPOSITORY, RECTAL RECTAL DAILY PRN
Status: DISCONTINUED | OUTPATIENT
Start: 2017-07-06 | End: 2017-07-10 | Stop reason: HOSPADM

## 2017-07-06 RX ORDER — VALPROIC ACID 250 MG/5ML
250 SOLUTION ORAL EVERY 8 HOURS
Status: DISCONTINUED | OUTPATIENT
Start: 2017-07-06 | End: 2017-07-10 | Stop reason: HOSPADM

## 2017-07-06 RX ORDER — BISACODYL 10 MG
10 SUPPOSITORY, RECTAL RECTAL AS NEEDED
Status: DISCONTINUED | OUTPATIENT
Start: 2017-07-06 | End: 2017-07-06

## 2017-07-06 RX ADMIN — CEFEPIME HYDROCHLORIDE 2000 MG: 2 INJECTION, POWDER, FOR SOLUTION INTRAVENOUS at 12:10

## 2017-07-06 RX ADMIN — LACTULOSE 20 G: 10 SOLUTION ORAL at 17:51

## 2017-07-06 RX ADMIN — LAMOTRIGINE 150 MG: 100 TABLET ORAL at 10:14

## 2017-07-06 RX ADMIN — CEFEPIME HYDROCHLORIDE 2000 MG: 2 INJECTION, SOLUTION INTRAVENOUS at 01:00

## 2017-07-06 RX ADMIN — SODIUM CHLORIDE 1317 ML: 0.9 INJECTION, SOLUTION INTRAVENOUS at 01:00

## 2017-07-06 RX ADMIN — LEVOCARNITINE 330 MG: 1 SOLUTION ORAL at 20:03

## 2017-07-06 RX ADMIN — SODIUM CHLORIDE 100 ML/HR: 0.9 INJECTION, SOLUTION INTRAVENOUS at 04:49

## 2017-07-06 RX ADMIN — CLOBAZAM 20 MG: 20 TABLET ORAL at 20:02

## 2017-07-06 RX ADMIN — TOPIRAMATE 250 MG: 100 TABLET ORAL at 17:49

## 2017-07-06 RX ADMIN — VANCOMYCIN HYDROCHLORIDE 750 MG: 750 INJECTION, POWDER, LYOPHILIZED, FOR SOLUTION INTRAVENOUS at 13:46

## 2017-07-06 RX ADMIN — RUFINAMIDE 1600 MG: 200 TABLET, FILM COATED ORAL at 21:39

## 2017-07-06 RX ADMIN — TOPIRAMATE 250 MG: 100 TABLET ORAL at 10:14

## 2017-07-06 RX ADMIN — VANCOMYCIN HYDROCHLORIDE 750 MG: 1 INJECTION, POWDER, LYOPHILIZED, FOR SOLUTION INTRAVENOUS at 02:35

## 2017-07-06 RX ADMIN — LACTULOSE 20 G: 10 SOLUTION ORAL at 10:14

## 2017-07-06 RX ADMIN — ENOXAPARIN SODIUM 40 MG: 40 INJECTION SUBCUTANEOUS at 10:13

## 2017-07-06 RX ADMIN — SODIUM CHLORIDE 100 ML/HR: 0.9 INJECTION, SOLUTION INTRAVENOUS at 13:43

## 2017-07-06 RX ADMIN — LAMOTRIGINE 150 MG: 100 TABLET ORAL at 17:50

## 2017-07-06 RX ADMIN — VALPROIC ACID 250 MG: 250 SOLUTION ORAL at 20:03

## 2017-07-06 RX ADMIN — IOHEXOL 100 ML: 350 INJECTION, SOLUTION INTRAVENOUS at 01:05

## 2017-07-07 ENCOUNTER — APPOINTMENT (INPATIENT)
Dept: OCCUPATIONAL THERAPY | Facility: HOSPITAL | Age: 37
DRG: 871 | End: 2017-07-07
Payer: MEDICARE

## 2017-07-07 ENCOUNTER — APPOINTMENT (INPATIENT)
Dept: PHYSICAL THERAPY | Facility: HOSPITAL | Age: 37
DRG: 871 | End: 2017-07-07
Payer: MEDICARE

## 2017-07-07 PROBLEM — R78.81 POSITIVE BLOOD CULTURE: Status: ACTIVE | Noted: 2017-07-07

## 2017-07-07 LAB
ANION GAP SERPL CALCULATED.3IONS-SCNC: 10 MMOL/L (ref 4–13)
BASOPHILS # BLD AUTO: 0.05 THOUSANDS/ΜL (ref 0–0.1)
BASOPHILS NFR BLD AUTO: 1 % (ref 0–1)
BUN SERPL-MCNC: 7 MG/DL (ref 5–25)
CALCIUM SERPL-MCNC: 8.7 MG/DL (ref 8.3–10.1)
CHLORIDE SERPL-SCNC: 106 MMOL/L (ref 100–108)
CO2 SERPL-SCNC: 21 MMOL/L (ref 21–32)
CREAT SERPL-MCNC: 0.44 MG/DL (ref 0.6–1.3)
EOSINOPHIL # BLD AUTO: 0.42 THOUSAND/ΜL (ref 0–0.61)
EOSINOPHIL NFR BLD AUTO: 5 % (ref 0–6)
ERYTHROCYTE [DISTWIDTH] IN BLOOD BY AUTOMATED COUNT: 14 % (ref 11.6–15.1)
GFR SERPL CREATININE-BSD FRML MDRD: >60 ML/MIN/1.73SQ M
GLUCOSE SERPL-MCNC: 140 MG/DL (ref 65–140)
HCT VFR BLD AUTO: 38 % (ref 34.8–46.1)
HGB BLD-MCNC: 12.2 G/DL (ref 11.5–15.4)
L PNEUMO1 AG UR QL IA.RAPID: NEGATIVE
LACTATE SERPL-SCNC: 1.2 MMOL/L (ref 0.5–2)
LYMPHOCYTES # BLD AUTO: 3.87 THOUSANDS/ΜL (ref 0.6–4.47)
LYMPHOCYTES NFR BLD AUTO: 49 % (ref 14–44)
MCH RBC QN AUTO: 31.9 PG (ref 26.8–34.3)
MCHC RBC AUTO-ENTMCNC: 32.1 G/DL (ref 31.4–37.4)
MCV RBC AUTO: 100 FL (ref 82–98)
MONOCYTES # BLD AUTO: 0.88 THOUSAND/ΜL (ref 0.17–1.22)
MONOCYTES NFR BLD AUTO: 11 % (ref 4–12)
NEUTROPHILS # BLD AUTO: 2.71 THOUSANDS/ΜL (ref 1.85–7.62)
NEUTS SEG NFR BLD AUTO: 34 % (ref 43–75)
PLATELET # BLD AUTO: 227 THOUSANDS/UL (ref 149–390)
PMV BLD AUTO: 11.3 FL (ref 8.9–12.7)
POTASSIUM SERPL-SCNC: 3.5 MMOL/L (ref 3.5–5.3)
RBC # BLD AUTO: 3.82 MILLION/UL (ref 3.81–5.12)
S PNEUM AG UR QL: NEGATIVE
SODIUM SERPL-SCNC: 137 MMOL/L (ref 136–145)
VANCOMYCIN TROUGH SERPL-MCNC: 3.6 UG/ML (ref 10–20)
WBC # BLD AUTO: 7.93 THOUSAND/UL (ref 4.31–10.16)

## 2017-07-07 PROCEDURE — 87449 NOS EACH ORGANISM AG IA: CPT | Performed by: FAMILY MEDICINE

## 2017-07-07 PROCEDURE — G8978 MOBILITY CURRENT STATUS: HCPCS | Performed by: PHYSICAL THERAPIST

## 2017-07-07 PROCEDURE — 97167 OT EVAL HIGH COMPLEX 60 MIN: CPT

## 2017-07-07 PROCEDURE — 83605 ASSAY OF LACTIC ACID: CPT | Performed by: PHYSICIAN ASSISTANT

## 2017-07-07 PROCEDURE — 85025 COMPLETE CBC W/AUTO DIFF WBC: CPT | Performed by: PHYSICIAN ASSISTANT

## 2017-07-07 PROCEDURE — G8988 SELF CARE GOAL STATUS: HCPCS

## 2017-07-07 PROCEDURE — 94760 N-INVAS EAR/PLS OXIMETRY 1: CPT

## 2017-07-07 PROCEDURE — 80048 BASIC METABOLIC PNL TOTAL CA: CPT | Performed by: PHYSICIAN ASSISTANT

## 2017-07-07 PROCEDURE — 80202 ASSAY OF VANCOMYCIN: CPT | Performed by: FAMILY MEDICINE

## 2017-07-07 PROCEDURE — G8987 SELF CARE CURRENT STATUS: HCPCS

## 2017-07-07 PROCEDURE — 97163 PT EVAL HIGH COMPLEX 45 MIN: CPT | Performed by: PHYSICAL THERAPIST

## 2017-07-07 PROCEDURE — 87040 BLOOD CULTURE FOR BACTERIA: CPT | Performed by: PHYSICIAN ASSISTANT

## 2017-07-07 PROCEDURE — G8979 MOBILITY GOAL STATUS: HCPCS | Performed by: PHYSICAL THERAPIST

## 2017-07-07 RX ADMIN — VALPROIC ACID 250 MG: 250 SOLUTION ORAL at 00:27

## 2017-07-07 RX ADMIN — LEVOCARNITINE 330 MG: 1 SOLUTION ORAL at 22:44

## 2017-07-07 RX ADMIN — SODIUM CHLORIDE 100 ML/HR: 0.9 INJECTION, SOLUTION INTRAVENOUS at 14:20

## 2017-07-07 RX ADMIN — TOPIRAMATE 250 MG: 100 TABLET ORAL at 17:06

## 2017-07-07 RX ADMIN — CLOBAZAM 20 MG: 20 TABLET ORAL at 17:12

## 2017-07-07 RX ADMIN — VALPROIC ACID 250 MG: 250 SOLUTION ORAL at 10:22

## 2017-07-07 RX ADMIN — LAMOTRIGINE 150 MG: 100 TABLET ORAL at 17:06

## 2017-07-07 RX ADMIN — CLOBAZAM 20 MG: 20 TABLET ORAL at 10:18

## 2017-07-07 RX ADMIN — LACTULOSE 20 G: 10 SOLUTION ORAL at 17:06

## 2017-07-07 RX ADMIN — LAMOTRIGINE 150 MG: 100 TABLET ORAL at 09:52

## 2017-07-07 RX ADMIN — RUFINAMIDE 1600 MG: 200 TABLET, FILM COATED ORAL at 17:18

## 2017-07-07 RX ADMIN — VALPROIC ACID 250 MG: 250 SOLUTION ORAL at 15:04

## 2017-07-07 RX ADMIN — TOPIRAMATE 250 MG: 100 TABLET ORAL at 09:52

## 2017-07-07 RX ADMIN — LACTULOSE 20 G: 10 SOLUTION ORAL at 09:52

## 2017-07-07 RX ADMIN — CEFEPIME HYDROCHLORIDE 2000 MG: 2 INJECTION, POWDER, FOR SOLUTION INTRAVENOUS at 14:59

## 2017-07-07 RX ADMIN — SODIUM CHLORIDE 100 ML/HR: 0.9 INJECTION, SOLUTION INTRAVENOUS at 04:02

## 2017-07-07 RX ADMIN — VALPROIC ACID 250 MG: 250 SOLUTION ORAL at 22:43

## 2017-07-07 RX ADMIN — RUFINAMIDE 1600 MG: 200 TABLET, FILM COATED ORAL at 10:25

## 2017-07-07 RX ADMIN — CEFEPIME HYDROCHLORIDE 2000 MG: 2 INJECTION, POWDER, FOR SOLUTION INTRAVENOUS at 00:27

## 2017-07-07 RX ADMIN — LEVOCARNITINE 330 MG: 1 SOLUTION ORAL at 06:01

## 2017-07-07 RX ADMIN — LEVOCARNITINE 330 MG: 1 SOLUTION ORAL at 15:03

## 2017-07-07 RX ADMIN — VANCOMYCIN HYDROCHLORIDE 750 MG: 750 INJECTION, POWDER, LYOPHILIZED, FOR SOLUTION INTRAVENOUS at 01:59

## 2017-07-07 RX ADMIN — ENOXAPARIN SODIUM 40 MG: 40 INJECTION SUBCUTANEOUS at 09:52

## 2017-07-08 LAB
ANION GAP SERPL CALCULATED.3IONS-SCNC: 9 MMOL/L (ref 4–13)
BASOPHILS # BLD AUTO: 0.03 THOUSANDS/ΜL (ref 0–0.1)
BASOPHILS NFR BLD AUTO: 0 % (ref 0–1)
BUN SERPL-MCNC: 8 MG/DL (ref 5–25)
CALCIUM SERPL-MCNC: 8.4 MG/DL (ref 8.3–10.1)
CHLORIDE SERPL-SCNC: 107 MMOL/L (ref 100–108)
CO2 SERPL-SCNC: 26 MMOL/L (ref 21–32)
CREAT SERPL-MCNC: 0.34 MG/DL (ref 0.6–1.3)
EOSINOPHIL # BLD AUTO: 0.37 THOUSAND/ΜL (ref 0–0.61)
EOSINOPHIL NFR BLD AUTO: 5 % (ref 0–6)
ERYTHROCYTE [DISTWIDTH] IN BLOOD BY AUTOMATED COUNT: 13.6 % (ref 11.6–15.1)
GFR SERPL CREATININE-BSD FRML MDRD: >60 ML/MIN/1.73SQ M
GLUCOSE SERPL-MCNC: 80 MG/DL (ref 65–140)
HCT VFR BLD AUTO: 34.8 % (ref 34.8–46.1)
HGB BLD-MCNC: 11.3 G/DL (ref 11.5–15.4)
LYMPHOCYTES # BLD AUTO: 2.73 THOUSANDS/ΜL (ref 0.6–4.47)
LYMPHOCYTES NFR BLD AUTO: 36 % (ref 14–44)
MAGNESIUM SERPL-MCNC: 1.9 MG/DL (ref 1.6–2.6)
MCH RBC QN AUTO: 31.7 PG (ref 26.8–34.3)
MCHC RBC AUTO-ENTMCNC: 32.5 G/DL (ref 31.4–37.4)
MCV RBC AUTO: 98 FL (ref 82–98)
MONOCYTES # BLD AUTO: 1.06 THOUSAND/ΜL (ref 0.17–1.22)
MONOCYTES NFR BLD AUTO: 14 % (ref 4–12)
NEUTROPHILS # BLD AUTO: 3.34 THOUSANDS/ΜL (ref 1.85–7.62)
NEUTS SEG NFR BLD AUTO: 45 % (ref 43–75)
PLATELET # BLD AUTO: 237 THOUSANDS/UL (ref 149–390)
PMV BLD AUTO: 10.3 FL (ref 8.9–12.7)
POTASSIUM SERPL-SCNC: 3.7 MMOL/L (ref 3.5–5.3)
RBC # BLD AUTO: 3.57 MILLION/UL (ref 3.81–5.12)
SODIUM SERPL-SCNC: 142 MMOL/L (ref 136–145)
WBC # BLD AUTO: 7.53 THOUSAND/UL (ref 4.31–10.16)

## 2017-07-08 PROCEDURE — 80048 BASIC METABOLIC PNL TOTAL CA: CPT | Performed by: PHYSICIAN ASSISTANT

## 2017-07-08 PROCEDURE — 85025 COMPLETE CBC W/AUTO DIFF WBC: CPT | Performed by: PHYSICIAN ASSISTANT

## 2017-07-08 PROCEDURE — 83735 ASSAY OF MAGNESIUM: CPT | Performed by: PHYSICIAN ASSISTANT

## 2017-07-08 RX ORDER — GUAIFENESIN 600 MG
600 TABLET, EXTENDED RELEASE 12 HR ORAL EVERY 12 HOURS SCHEDULED
Status: DISCONTINUED | OUTPATIENT
Start: 2017-07-08 | End: 2017-07-10 | Stop reason: HOSPADM

## 2017-07-08 RX ADMIN — SODIUM CHLORIDE 100 ML/HR: 0.9 INJECTION, SOLUTION INTRAVENOUS at 17:30

## 2017-07-08 RX ADMIN — CEFEPIME HYDROCHLORIDE 2000 MG: 2 INJECTION, POWDER, FOR SOLUTION INTRAVENOUS at 03:25

## 2017-07-08 RX ADMIN — LEVOCARNITINE 330 MG: 1 SOLUTION ORAL at 22:14

## 2017-07-08 RX ADMIN — LACTULOSE 20 G: 10 SOLUTION ORAL at 09:26

## 2017-07-08 RX ADMIN — LEVOCARNITINE 330 MG: 1 SOLUTION ORAL at 13:45

## 2017-07-08 RX ADMIN — TOPIRAMATE 250 MG: 100 TABLET ORAL at 09:28

## 2017-07-08 RX ADMIN — LAMOTRIGINE 150 MG: 100 TABLET ORAL at 17:29

## 2017-07-08 RX ADMIN — LAMOTRIGINE 150 MG: 100 TABLET ORAL at 09:27

## 2017-07-08 RX ADMIN — CEFEPIME HYDROCHLORIDE 2000 MG: 2 INJECTION, POWDER, FOR SOLUTION INTRAVENOUS at 13:48

## 2017-07-08 RX ADMIN — TOPIRAMATE 250 MG: 100 TABLET ORAL at 17:44

## 2017-07-08 RX ADMIN — CLOBAZAM 20 MG: 20 TABLET ORAL at 09:31

## 2017-07-08 RX ADMIN — ENOXAPARIN SODIUM 40 MG: 40 INJECTION SUBCUTANEOUS at 09:31

## 2017-07-08 RX ADMIN — CLOBAZAM 20 MG: 20 TABLET ORAL at 17:32

## 2017-07-08 RX ADMIN — RUFINAMIDE 1600 MG: 200 TABLET, FILM COATED ORAL at 09:29

## 2017-07-08 RX ADMIN — VALPROIC ACID 250 MG: 250 SOLUTION ORAL at 22:13

## 2017-07-08 RX ADMIN — VALPROIC ACID 250 MG: 250 SOLUTION ORAL at 07:00

## 2017-07-08 RX ADMIN — RUFINAMIDE 1600 MG: 200 TABLET, FILM COATED ORAL at 17:27

## 2017-07-08 RX ADMIN — VALPROIC ACID 250 MG: 250 SOLUTION ORAL at 13:46

## 2017-07-08 RX ADMIN — LEVOCARNITINE 330 MG: 1 SOLUTION ORAL at 07:00

## 2017-07-08 RX ADMIN — GUAIFENESIN 600 MG: 600 TABLET, EXTENDED RELEASE ORAL at 13:46

## 2017-07-08 RX ADMIN — SODIUM CHLORIDE 100 ML/HR: 0.9 INJECTION, SOLUTION INTRAVENOUS at 07:32

## 2017-07-08 RX ADMIN — GUAIFENESIN 600 MG: 600 TABLET, EXTENDED RELEASE ORAL at 22:11

## 2017-07-09 LAB
ANION GAP SERPL CALCULATED.3IONS-SCNC: 9 MMOL/L (ref 4–13)
BACTERIA BLD CULT: NORMAL
BASOPHILS # BLD AUTO: 0.03 THOUSANDS/ΜL (ref 0–0.1)
BASOPHILS NFR BLD AUTO: 0 % (ref 0–1)
BUN SERPL-MCNC: 8 MG/DL (ref 5–25)
CALCIUM SERPL-MCNC: 8.5 MG/DL (ref 8.3–10.1)
CHLORIDE SERPL-SCNC: 108 MMOL/L (ref 100–108)
CO2 SERPL-SCNC: 25 MMOL/L (ref 21–32)
CREAT SERPL-MCNC: 0.45 MG/DL (ref 0.6–1.3)
EOSINOPHIL # BLD AUTO: 0.31 THOUSAND/ΜL (ref 0–0.61)
EOSINOPHIL NFR BLD AUTO: 3 % (ref 0–6)
ERYTHROCYTE [DISTWIDTH] IN BLOOD BY AUTOMATED COUNT: 13.7 % (ref 11.6–15.1)
GFR SERPL CREATININE-BSD FRML MDRD: >60 ML/MIN/1.73SQ M
GLUCOSE SERPL-MCNC: 144 MG/DL (ref 65–140)
GRAM STN SPEC: NORMAL
HCT VFR BLD AUTO: 36.8 % (ref 34.8–46.1)
HGB BLD-MCNC: 11.8 G/DL (ref 11.5–15.4)
LYMPHOCYTES # BLD AUTO: 3.62 THOUSANDS/ΜL (ref 0.6–4.47)
LYMPHOCYTES NFR BLD AUTO: 36 % (ref 14–44)
MCH RBC QN AUTO: 31.5 PG (ref 26.8–34.3)
MCHC RBC AUTO-ENTMCNC: 32.1 G/DL (ref 31.4–37.4)
MCV RBC AUTO: 98 FL (ref 82–98)
MONOCYTES # BLD AUTO: 0.69 THOUSAND/ΜL (ref 0.17–1.22)
MONOCYTES NFR BLD AUTO: 7 % (ref 4–12)
NEUTROPHILS # BLD AUTO: 5.48 THOUSANDS/ΜL (ref 1.85–7.62)
NEUTS SEG NFR BLD AUTO: 54 % (ref 43–75)
PLATELET # BLD AUTO: 263 THOUSANDS/UL (ref 149–390)
PMV BLD AUTO: 10.3 FL (ref 8.9–12.7)
POTASSIUM SERPL-SCNC: 3.7 MMOL/L (ref 3.5–5.3)
RBC # BLD AUTO: 3.75 MILLION/UL (ref 3.81–5.12)
SODIUM SERPL-SCNC: 142 MMOL/L (ref 136–145)
WBC # BLD AUTO: 10.13 THOUSAND/UL (ref 4.31–10.16)

## 2017-07-09 PROCEDURE — 80048 BASIC METABOLIC PNL TOTAL CA: CPT | Performed by: PHYSICIAN ASSISTANT

## 2017-07-09 PROCEDURE — 85025 COMPLETE CBC W/AUTO DIFF WBC: CPT | Performed by: PHYSICIAN ASSISTANT

## 2017-07-09 RX ORDER — LEVOFLOXACIN 750 MG/1
750 TABLET ORAL EVERY 24 HOURS
Status: DISCONTINUED | OUTPATIENT
Start: 2017-07-09 | End: 2017-07-10 | Stop reason: HOSPADM

## 2017-07-09 RX ADMIN — LEVOCARNITINE 330 MG: 1 SOLUTION ORAL at 23:01

## 2017-07-09 RX ADMIN — RUFINAMIDE 1600 MG: 200 TABLET, FILM COATED ORAL at 17:15

## 2017-07-09 RX ADMIN — CEFEPIME HYDROCHLORIDE 2000 MG: 2 INJECTION, POWDER, FOR SOLUTION INTRAVENOUS at 14:47

## 2017-07-09 RX ADMIN — GUAIFENESIN 600 MG: 600 TABLET, EXTENDED RELEASE ORAL at 09:58

## 2017-07-09 RX ADMIN — SODIUM CHLORIDE 100 ML/HR: 0.9 INJECTION, SOLUTION INTRAVENOUS at 04:31

## 2017-07-09 RX ADMIN — GUAIFENESIN 600 MG: 600 TABLET, EXTENDED RELEASE ORAL at 23:00

## 2017-07-09 RX ADMIN — LAMOTRIGINE 150 MG: 100 TABLET ORAL at 09:58

## 2017-07-09 RX ADMIN — TOPIRAMATE 250 MG: 100 TABLET ORAL at 09:58

## 2017-07-09 RX ADMIN — LACTULOSE 20 G: 10 SOLUTION ORAL at 09:59

## 2017-07-09 RX ADMIN — LEVOCARNITINE 330 MG: 1 SOLUTION ORAL at 14:48

## 2017-07-09 RX ADMIN — LAMOTRIGINE 150 MG: 100 TABLET ORAL at 17:13

## 2017-07-09 RX ADMIN — TOPIRAMATE 250 MG: 100 TABLET ORAL at 17:13

## 2017-07-09 RX ADMIN — ENOXAPARIN SODIUM 40 MG: 40 INJECTION SUBCUTANEOUS at 09:58

## 2017-07-09 RX ADMIN — VALPROIC ACID 250 MG: 250 SOLUTION ORAL at 23:00

## 2017-07-09 RX ADMIN — VALPROIC ACID 250 MG: 250 SOLUTION ORAL at 05:05

## 2017-07-09 RX ADMIN — LEVOFLOXACIN 750 MG: 750 TABLET, FILM COATED ORAL at 23:00

## 2017-07-09 RX ADMIN — SODIUM CHLORIDE 100 ML/HR: 0.9 INJECTION, SOLUTION INTRAVENOUS at 14:48

## 2017-07-09 RX ADMIN — LACTULOSE 20 G: 10 SOLUTION ORAL at 17:13

## 2017-07-09 RX ADMIN — RUFINAMIDE 1600 MG: 200 TABLET, FILM COATED ORAL at 09:59

## 2017-07-09 RX ADMIN — LEVOCARNITINE 330 MG: 1 SOLUTION ORAL at 05:05

## 2017-07-09 RX ADMIN — CLOBAZAM 20 MG: 20 TABLET ORAL at 17:13

## 2017-07-09 RX ADMIN — CEFEPIME HYDROCHLORIDE 2000 MG: 2 INJECTION, POWDER, FOR SOLUTION INTRAVENOUS at 02:06

## 2017-07-09 RX ADMIN — CLOBAZAM 20 MG: 20 TABLET ORAL at 09:59

## 2017-07-09 RX ADMIN — VALPROIC ACID 250 MG: 250 SOLUTION ORAL at 14:47

## 2017-07-10 VITALS
RESPIRATION RATE: 18 BRPM | HEART RATE: 85 BPM | OXYGEN SATURATION: 98 % | TEMPERATURE: 98.5 F | SYSTOLIC BLOOD PRESSURE: 97 MMHG | DIASTOLIC BLOOD PRESSURE: 60 MMHG | WEIGHT: 88.4 LBS | BODY MASS INDEX: 16.27 KG/M2 | HEIGHT: 62 IN

## 2017-07-10 LAB
ANION GAP SERPL CALCULATED.3IONS-SCNC: 7 MMOL/L (ref 4–13)
BASOPHILS # BLD AUTO: 0.03 THOUSANDS/ΜL (ref 0–0.1)
BASOPHILS NFR BLD AUTO: 0 % (ref 0–1)
BUN SERPL-MCNC: 8 MG/DL (ref 5–25)
CALCIUM SERPL-MCNC: 8.7 MG/DL (ref 8.3–10.1)
CHLORIDE SERPL-SCNC: 105 MMOL/L (ref 100–108)
CO2 SERPL-SCNC: 25 MMOL/L (ref 21–32)
CREAT SERPL-MCNC: 0.38 MG/DL (ref 0.6–1.3)
EOSINOPHIL # BLD AUTO: 0.24 THOUSAND/ΜL (ref 0–0.61)
EOSINOPHIL NFR BLD AUTO: 4 % (ref 0–6)
ERYTHROCYTE [DISTWIDTH] IN BLOOD BY AUTOMATED COUNT: 13.4 % (ref 11.6–15.1)
GFR SERPL CREATININE-BSD FRML MDRD: >60 ML/MIN/1.73SQ M
GLUCOSE SERPL-MCNC: 80 MG/DL (ref 65–140)
HCT VFR BLD AUTO: 34.8 % (ref 34.8–46.1)
HGB BLD-MCNC: 11.3 G/DL (ref 11.5–15.4)
LYMPHOCYTES # BLD AUTO: 3.26 THOUSANDS/ΜL (ref 0.6–4.47)
LYMPHOCYTES NFR BLD AUTO: 47 % (ref 14–44)
MCH RBC QN AUTO: 31.6 PG (ref 26.8–34.3)
MCHC RBC AUTO-ENTMCNC: 32.5 G/DL (ref 31.4–37.4)
MCV RBC AUTO: 97 FL (ref 82–98)
MONOCYTES # BLD AUTO: 0.79 THOUSAND/ΜL (ref 0.17–1.22)
MONOCYTES NFR BLD AUTO: 11 % (ref 4–12)
NEUTROPHILS # BLD AUTO: 2.58 THOUSANDS/ΜL (ref 1.85–7.62)
NEUTS SEG NFR BLD AUTO: 38 % (ref 43–75)
PLATELET # BLD AUTO: 245 THOUSANDS/UL (ref 149–390)
PMV BLD AUTO: 10.6 FL (ref 8.9–12.7)
POTASSIUM SERPL-SCNC: 3.8 MMOL/L (ref 3.5–5.3)
RBC # BLD AUTO: 3.58 MILLION/UL (ref 3.81–5.12)
SODIUM SERPL-SCNC: 137 MMOL/L (ref 136–145)
WBC # BLD AUTO: 6.9 THOUSAND/UL (ref 4.31–10.16)

## 2017-07-10 PROCEDURE — 80048 BASIC METABOLIC PNL TOTAL CA: CPT | Performed by: PHYSICIAN ASSISTANT

## 2017-07-10 PROCEDURE — 85025 COMPLETE CBC W/AUTO DIFF WBC: CPT | Performed by: PHYSICIAN ASSISTANT

## 2017-07-10 RX ORDER — LEVOFLOXACIN 750 MG/1
750 TABLET ORAL EVERY 24 HOURS
Qty: 6 TABLET | Refills: 0 | Status: SHIPPED | OUTPATIENT
Start: 2017-07-10 | End: 2017-07-16

## 2017-07-10 RX ADMIN — LEVOCARNITINE 330 MG: 1 SOLUTION ORAL at 14:57

## 2017-07-10 RX ADMIN — TOPIRAMATE 250 MG: 100 TABLET ORAL at 09:47

## 2017-07-10 RX ADMIN — ENOXAPARIN SODIUM 40 MG: 40 INJECTION SUBCUTANEOUS at 09:39

## 2017-07-10 RX ADMIN — CLOBAZAM 20 MG: 20 TABLET ORAL at 09:37

## 2017-07-10 RX ADMIN — VALPROIC ACID 250 MG: 250 SOLUTION ORAL at 02:50

## 2017-07-10 RX ADMIN — LACTULOSE 20 G: 10 SOLUTION ORAL at 09:40

## 2017-07-10 RX ADMIN — GUAIFENESIN 600 MG: 600 TABLET, EXTENDED RELEASE ORAL at 09:39

## 2017-07-10 RX ADMIN — LAMOTRIGINE 150 MG: 100 TABLET ORAL at 09:41

## 2017-07-10 RX ADMIN — RUFINAMIDE 1600 MG: 200 TABLET, FILM COATED ORAL at 09:50

## 2017-07-10 RX ADMIN — LEVOCARNITINE 330 MG: 1 SOLUTION ORAL at 05:09

## 2017-07-10 RX ADMIN — VALPROIC ACID 250 MG: 250 SOLUTION ORAL at 05:09

## 2017-07-11 LAB — BACTERIA BLD CULT: NORMAL

## 2017-07-12 LAB
BACTERIA BLD CULT: NORMAL
BACTERIA BLD CULT: NORMAL

## 2017-07-14 ENCOUNTER — HOSPITAL ENCOUNTER (EMERGENCY)
Facility: HOSPITAL | Age: 37
Discharge: HOME | End: 2017-07-15
Attending: EMERGENCY MEDICINE | Admitting: EMERGENCY MEDICINE
Payer: MEDICARE

## 2017-07-14 ENCOUNTER — APPOINTMENT (EMERGENCY)
Dept: RADIOLOGY | Facility: HOSPITAL | Age: 37
End: 2017-07-14
Payer: MEDICARE

## 2017-07-14 VITALS
HEART RATE: 72 BPM | DIASTOLIC BLOOD PRESSURE: 49 MMHG | WEIGHT: 96.34 LBS | TEMPERATURE: 98.4 F | RESPIRATION RATE: 18 BRPM | SYSTOLIC BLOOD PRESSURE: 95 MMHG | HEIGHT: 64 IN | BODY MASS INDEX: 16.45 KG/M2

## 2017-07-14 DIAGNOSIS — K94.23 GASTROSTOMY TUBE OBSTRUCTION (HCC): Primary | ICD-10-CM

## 2017-07-14 PROCEDURE — 74000 HB X-RAY EXAM OF ABDOMEN (SINGLE ANTEROPOSTERIOR VIEW): CPT

## 2017-07-15 PROCEDURE — 99284 EMERGENCY DEPT VISIT MOD MDM: CPT

## 2017-07-18 ENCOUNTER — HOSPITAL ENCOUNTER (EMERGENCY)
Facility: HOSPITAL | Age: 37
Discharge: HOME | End: 2017-07-18
Payer: MEDICARE

## 2017-07-18 VITALS
TEMPERATURE: 97.7 F | HEIGHT: 64 IN | WEIGHT: 96 LBS | RESPIRATION RATE: 18 BRPM | OXYGEN SATURATION: 98 % | SYSTOLIC BLOOD PRESSURE: 120 MMHG | BODY MASS INDEX: 16.39 KG/M2 | DIASTOLIC BLOOD PRESSURE: 60 MMHG | HEART RATE: 74 BPM

## 2017-07-18 DIAGNOSIS — K94.23 PEG TUBE MALFUNCTION (HCC): Primary | ICD-10-CM

## 2017-07-18 PROCEDURE — 99284 EMERGENCY DEPT VISIT MOD MDM: CPT

## 2017-09-11 ENCOUNTER — ALLSCRIPTS OFFICE VISIT (OUTPATIENT)
Dept: OTHER | Facility: OTHER | Age: 37
End: 2017-09-11

## 2017-09-11 DIAGNOSIS — F79 INTELLECTUAL DISABILITY: ICD-10-CM

## 2017-10-10 ENCOUNTER — APPOINTMENT (INPATIENT)
Dept: RADIOLOGY | Facility: HOSPITAL | Age: 37
DRG: 871 | End: 2017-10-10
Payer: MEDICARE

## 2017-10-10 ENCOUNTER — HOSPITAL ENCOUNTER (INPATIENT)
Facility: HOSPITAL | Age: 37
LOS: 16 days | Discharge: NURSING, REHAB OR LONG-TERM CARE | DRG: 871 | End: 2017-10-26
Attending: HOSPITALIST | Admitting: INTERNAL MEDICINE
Payer: MEDICARE

## 2017-10-10 ENCOUNTER — HOSPITAL ENCOUNTER (INPATIENT)
Facility: HOSPITAL | Age: 37
LOS: 1 days | DRG: 872 | End: 2017-10-10
Attending: EMERGENCY MEDICINE | Admitting: HOSPITALIST
Payer: MEDICARE

## 2017-10-10 ENCOUNTER — APPOINTMENT (EMERGENCY)
Dept: RADIOLOGY | Facility: HOSPITAL | Age: 37
DRG: 872 | End: 2017-10-10
Payer: MEDICARE

## 2017-10-10 VITALS
DIASTOLIC BLOOD PRESSURE: 57 MMHG | TEMPERATURE: 99 F | HEIGHT: 64 IN | SYSTOLIC BLOOD PRESSURE: 120 MMHG | HEART RATE: 85 BPM | BODY MASS INDEX: 17.46 KG/M2 | OXYGEN SATURATION: 93 % | WEIGHT: 102.29 LBS | RESPIRATION RATE: 23 BRPM

## 2017-10-10 DIAGNOSIS — R56.9 SEIZURE (HCC): ICD-10-CM

## 2017-10-10 DIAGNOSIS — K94.23 GASTROSTOMY TUBE OBSTRUCTION (HCC): Primary | ICD-10-CM

## 2017-10-10 DIAGNOSIS — A41.9 SEPSIS (HCC): Primary | ICD-10-CM

## 2017-10-10 DIAGNOSIS — G40.812 LENNOX-GASTAUT SYNDROME (HCC): ICD-10-CM

## 2017-10-10 DIAGNOSIS — E87.2 LACTIC ACIDOSIS: ICD-10-CM

## 2017-10-10 DIAGNOSIS — J69.0 ASPIRATION PNEUMONIA (HCC): ICD-10-CM

## 2017-10-10 LAB
ALBUMIN SERPL BCP-MCNC: 3.1 G/DL (ref 3.5–5)
ALP SERPL-CCNC: 93 U/L (ref 46–116)
ALT SERPL W P-5'-P-CCNC: 34 U/L (ref 12–78)
ANION GAP SERPL CALCULATED.3IONS-SCNC: 15 MMOL/L (ref 4–13)
APTT PPP: 31 SECONDS (ref 23–35)
AST SERPL W P-5'-P-CCNC: 22 U/L (ref 5–45)
ATRIAL RATE: 92 BPM
BASOPHILS # BLD AUTO: 0.03 THOUSANDS/ΜL (ref 0–0.1)
BASOPHILS NFR BLD AUTO: 0 % (ref 0–1)
BILIRUB SERPL-MCNC: 0.2 MG/DL (ref 0.2–1)
BILIRUB UR QL STRIP: NEGATIVE
BUN SERPL-MCNC: 16 MG/DL (ref 5–25)
CALCIUM SERPL-MCNC: 8.5 MG/DL (ref 8.3–10.1)
CHLORIDE SERPL-SCNC: 107 MMOL/L (ref 100–108)
CLARITY UR: CLEAR
CO2 SERPL-SCNC: 20 MMOL/L (ref 21–32)
COLOR UR: YELLOW
CREAT SERPL-MCNC: 0.6 MG/DL (ref 0.6–1.3)
EOSINOPHIL # BLD AUTO: 0.01 THOUSAND/ΜL (ref 0–0.61)
EOSINOPHIL NFR BLD AUTO: 0 % (ref 0–6)
ERYTHROCYTE [DISTWIDTH] IN BLOOD BY AUTOMATED COUNT: 14 % (ref 11.6–15.1)
GFR SERPL CREATININE-BSD FRML MDRD: 118 ML/MIN/1.73SQ M
GLUCOSE SERPL-MCNC: 141 MG/DL (ref 65–140)
GLUCOSE UR STRIP-MCNC: NEGATIVE MG/DL
HCT VFR BLD AUTO: 39.7 % (ref 34.8–46.1)
HGB BLD-MCNC: 13 G/DL (ref 11.5–15.4)
HGB UR QL STRIP.AUTO: NEGATIVE
INR PPP: 1.05 (ref 0.86–1.16)
KETONES UR STRIP-MCNC: ABNORMAL MG/DL
LACTATE SERPL-SCNC: 0.8 MMOL/L (ref 0.5–2)
LACTATE SERPL-SCNC: 2.7 MMOL/L (ref 0.5–2)
LEUKOCYTE ESTERASE UR QL STRIP: NEGATIVE
LIPASE SERPL-CCNC: 189 U/L (ref 73–393)
LYMPHOCYTES # BLD AUTO: 1.84 THOUSANDS/ΜL (ref 0.6–4.47)
LYMPHOCYTES NFR BLD AUTO: 7 % (ref 14–44)
MCH RBC QN AUTO: 33 PG (ref 26.8–34.3)
MCHC RBC AUTO-ENTMCNC: 32.7 G/DL (ref 31.4–37.4)
MCV RBC AUTO: 101 FL (ref 82–98)
MONOCYTES # BLD AUTO: 1.94 THOUSAND/ΜL (ref 0.17–1.22)
MONOCYTES NFR BLD AUTO: 8 % (ref 4–12)
NEUTROPHILS # BLD AUTO: 21.41 THOUSANDS/ΜL (ref 1.85–7.62)
NEUTS SEG NFR BLD AUTO: 85 % (ref 43–75)
NITRITE UR QL STRIP: NEGATIVE
P AXIS: 70 DEGREES
PH UR STRIP.AUTO: 5.5 [PH] (ref 4.5–8)
PLATELET # BLD AUTO: 154 THOUSANDS/UL (ref 149–390)
PMV BLD AUTO: 12 FL (ref 8.9–12.7)
POTASSIUM SERPL-SCNC: 3.7 MMOL/L (ref 3.5–5.3)
PR INTERVAL: 134 MS
PROT SERPL-MCNC: 6.8 G/DL (ref 6.4–8.2)
PROT UR STRIP-MCNC: NEGATIVE MG/DL
PROTHROMBIN TIME: 13.6 SECONDS (ref 12.1–14.4)
QRS AXIS: 80 DEGREES
QRSD INTERVAL: 66 MS
QT INTERVAL: 340 MS
QTC INTERVAL: 420 MS
RBC # BLD AUTO: 3.94 MILLION/UL (ref 3.81–5.12)
SODIUM SERPL-SCNC: 142 MMOL/L (ref 136–145)
SP GR UR STRIP.AUTO: 1.02 (ref 1–1.03)
T WAVE AXIS: 58 DEGREES
TROPONIN I SERPL-MCNC: <0.02 NG/ML
UROBILINOGEN UR QL STRIP.AUTO: 0.2 E.U./DL
VALPROATE SERPL-MCNC: 124 UG/ML (ref 50–100)
VENTRICULAR RATE: 92 BPM
WBC # BLD AUTO: 25.23 THOUSAND/UL (ref 4.31–10.16)

## 2017-10-10 PROCEDURE — 80053 COMPREHEN METABOLIC PANEL: CPT | Performed by: EMERGENCY MEDICINE

## 2017-10-10 PROCEDURE — 87040 BLOOD CULTURE FOR BACTERIA: CPT | Performed by: EMERGENCY MEDICINE

## 2017-10-10 PROCEDURE — 85730 THROMBOPLASTIN TIME PARTIAL: CPT | Performed by: EMERGENCY MEDICINE

## 2017-10-10 PROCEDURE — 92610 EVALUATE SWALLOWING FUNCTION: CPT

## 2017-10-10 PROCEDURE — 80164 ASSAY DIPROPYLACETIC ACD TOT: CPT | Performed by: EMERGENCY MEDICINE

## 2017-10-10 PROCEDURE — 87081 CULTURE SCREEN ONLY: CPT | Performed by: INTERNAL MEDICINE

## 2017-10-10 PROCEDURE — 81003 URINALYSIS AUTO W/O SCOPE: CPT | Performed by: EMERGENCY MEDICINE

## 2017-10-10 PROCEDURE — 96365 THER/PROPH/DIAG IV INF INIT: CPT

## 2017-10-10 PROCEDURE — 83690 ASSAY OF LIPASE: CPT | Performed by: EMERGENCY MEDICINE

## 2017-10-10 PROCEDURE — 36415 COLL VENOUS BLD VENIPUNCTURE: CPT | Performed by: EMERGENCY MEDICINE

## 2017-10-10 PROCEDURE — 84484 ASSAY OF TROPONIN QUANT: CPT | Performed by: EMERGENCY MEDICINE

## 2017-10-10 PROCEDURE — 85025 COMPLETE CBC W/AUTO DIFF WBC: CPT | Performed by: EMERGENCY MEDICINE

## 2017-10-10 PROCEDURE — 99285 EMERGENCY DEPT VISIT HI MDM: CPT

## 2017-10-10 PROCEDURE — 71010 HB CHEST X-RAY 1 VIEW FRONTAL (PORTABLE): CPT

## 2017-10-10 PROCEDURE — 83605 ASSAY OF LACTIC ACID: CPT | Performed by: EMERGENCY MEDICINE

## 2017-10-10 PROCEDURE — 93005 ELECTROCARDIOGRAM TRACING: CPT | Performed by: EMERGENCY MEDICINE

## 2017-10-10 PROCEDURE — 71250 CT THORAX DX C-: CPT

## 2017-10-10 PROCEDURE — 85610 PROTHROMBIN TIME: CPT | Performed by: EMERGENCY MEDICINE

## 2017-10-10 RX ORDER — OFLOXACIN 3 MG/ML
1 SOLUTION/ DROPS OPHTHALMIC 4 TIMES DAILY
COMMUNITY
End: 2017-11-22

## 2017-10-10 RX ORDER — SODIUM CHLORIDE 9 MG/ML
125 INJECTION, SOLUTION INTRAVENOUS CONTINUOUS
Status: DISCONTINUED | OUTPATIENT
Start: 2017-10-10 | End: 2017-10-12

## 2017-10-10 RX ORDER — LAMOTRIGINE 100 MG/1
200 TABLET ORAL 2 TIMES DAILY
Status: DISCONTINUED | OUTPATIENT
Start: 2017-10-10 | End: 2017-10-13

## 2017-10-10 RX ORDER — ONDANSETRON 4 MG/1
4 TABLET, FILM COATED ORAL EVERY 8 HOURS PRN
COMMUNITY
End: 2017-10-26 | Stop reason: HOSPADM

## 2017-10-10 RX ORDER — LACTULOSE 20 G/30ML
20 SOLUTION ORAL 2 TIMES DAILY
Status: DISCONTINUED | OUTPATIENT
Start: 2017-10-10 | End: 2017-10-17

## 2017-10-10 RX ORDER — VALPROIC ACID 250 MG/5ML
250 SOLUTION ORAL EVERY 8 HOURS
Status: DISCONTINUED | OUTPATIENT
Start: 2017-10-10 | End: 2017-10-19

## 2017-10-10 RX ORDER — ACETAMINOPHEN 160 MG/5ML
650 SUSPENSION, ORAL (FINAL DOSE FORM) ORAL EVERY 4 HOURS PRN
Status: DISCONTINUED | OUTPATIENT
Start: 2017-10-10 | End: 2017-10-18

## 2017-10-10 RX ORDER — ONDANSETRON 4 MG/1
4 TABLET, ORALLY DISINTEGRATING ORAL EVERY 8 HOURS PRN
Status: DISCONTINUED | OUTPATIENT
Start: 2017-10-10 | End: 2017-10-26 | Stop reason: HOSPADM

## 2017-10-10 RX ORDER — OFLOXACIN 3 MG/ML
1 SOLUTION/ DROPS OPHTHALMIC 4 TIMES DAILY
Status: DISCONTINUED | OUTPATIENT
Start: 2017-10-10 | End: 2017-10-26 | Stop reason: HOSPADM

## 2017-10-10 RX ORDER — ONDANSETRON 2 MG/ML
4 INJECTION INTRAMUSCULAR; INTRAVENOUS EVERY 6 HOURS PRN
Status: DISCONTINUED | OUTPATIENT
Start: 2017-10-10 | End: 2017-10-26 | Stop reason: HOSPADM

## 2017-10-10 RX ORDER — LEVOCARNITINE 1 G/10ML
100 SOLUTION ORAL 3 TIMES DAILY
Status: DISCONTINUED | OUTPATIENT
Start: 2017-10-10 | End: 2017-10-13

## 2017-10-10 RX ORDER — MINERAL OIL AND PETROLATUM 150; 830 MG/G; MG/G
1 OINTMENT OPHTHALMIC 3 TIMES DAILY
Status: DISCONTINUED | OUTPATIENT
Start: 2017-10-10 | End: 2017-10-26 | Stop reason: HOSPADM

## 2017-10-10 RX ORDER — BISACODYL 10 MG
10 SUPPOSITORY, RECTAL RECTAL AS NEEDED
Status: DISCONTINUED | OUTPATIENT
Start: 2017-10-13 | End: 2017-10-26 | Stop reason: HOSPADM

## 2017-10-10 RX ORDER — RUFINAMIDE 200 MG/1
1600 TABLET, FILM COATED ORAL 2 TIMES DAILY
Status: DISCONTINUED | OUTPATIENT
Start: 2017-10-10 | End: 2017-10-26 | Stop reason: HOSPADM

## 2017-10-10 RX ADMIN — Medication 1 TABLET: at 12:06

## 2017-10-10 RX ADMIN — SODIUM CHLORIDE 125 ML/HR: 0.9 INJECTION, SOLUTION INTRAVENOUS at 11:07

## 2017-10-10 RX ADMIN — MINERAL OIL AND WHITE PETROLATUM 1 APPLICATION: 150; 830 OINTMENT OPHTHALMIC at 22:21

## 2017-10-10 RX ADMIN — SODIUM CHLORIDE 125 ML/HR: 0.9 INJECTION, SOLUTION INTRAVENOUS at 23:43

## 2017-10-10 RX ADMIN — CEFEPIME HYDROCHLORIDE 2000 MG: 2 INJECTION, SOLUTION INTRAVENOUS at 04:31

## 2017-10-10 RX ADMIN — LEVOCARNITINE 100 MG: 1 SOLUTION ORAL at 22:22

## 2017-10-10 RX ADMIN — LACTULOSE 20 G: 20 SOLUTION ORAL at 12:05

## 2017-10-10 RX ADMIN — ENOXAPARIN SODIUM 40 MG: 40 INJECTION SUBCUTANEOUS at 12:06

## 2017-10-10 RX ADMIN — SODIUM CHLORIDE 1000 ML: 0.9 INJECTION, SOLUTION INTRAVENOUS at 05:24

## 2017-10-10 RX ADMIN — CEFEPIME HYDROCHLORIDE 2000 MG: 2 INJECTION, POWDER, FOR SOLUTION INTRAVENOUS at 23:43

## 2017-10-10 RX ADMIN — RUFINAMIDE 1600 MG: 200 TABLET, FILM COATED ORAL at 22:22

## 2017-10-10 RX ADMIN — MINERAL OIL AND WHITE PETROLATUM 1 APPLICATION: 150; 830 OINTMENT OPHTHALMIC at 17:24

## 2017-10-10 RX ADMIN — OFLOXACIN 1 DROP: 3 SOLUTION OPHTHALMIC at 17:24

## 2017-10-10 RX ADMIN — SODIUM CHLORIDE 1000 ML: 0.9 INJECTION, SOLUTION INTRAVENOUS at 04:30

## 2017-10-10 RX ADMIN — RUFINAMIDE 1600 MG: 200 TABLET, FILM COATED ORAL at 13:01

## 2017-10-10 RX ADMIN — CEFEPIME HYDROCHLORIDE 2000 MG: 2 INJECTION, POWDER, FOR SOLUTION INTRAVENOUS at 12:06

## 2017-10-10 RX ADMIN — LEVOCARNITINE 100 MG: 1 SOLUTION ORAL at 17:24

## 2017-10-10 RX ADMIN — VANCOMYCIN HYDROCHLORIDE 750 MG: 750 INJECTION, SOLUTION INTRAVENOUS at 13:21

## 2017-10-10 RX ADMIN — VANCOMYCIN HYDROCHLORIDE 750 MG: 1 INJECTION, POWDER, LYOPHILIZED, FOR SOLUTION INTRAVENOUS at 05:21

## 2017-10-10 RX ADMIN — LAMOTRIGINE 200 MG: 100 TABLET ORAL at 12:06

## 2017-10-10 RX ADMIN — LACTULOSE 20 G: 20 SOLUTION ORAL at 17:24

## 2017-10-10 RX ADMIN — VALPROIC ACID 250 MG: 250 SOLUTION ORAL at 18:19

## 2017-10-10 RX ADMIN — TOPIRAMATE 250 MG: 100 TABLET, FILM COATED ORAL at 17:24

## 2017-10-10 RX ADMIN — VALPROIC ACID 250 MG: 250 SOLUTION ORAL at 12:05

## 2017-10-10 RX ADMIN — LAMOTRIGINE 200 MG: 100 TABLET ORAL at 17:24

## 2017-10-10 RX ADMIN — OFLOXACIN 1 DROP: 3 SOLUTION OPHTHALMIC at 22:22

## 2017-10-10 RX ADMIN — SODIUM CHLORIDE 1000 ML: 0.9 INJECTION, SOLUTION INTRAVENOUS at 20:54

## 2017-10-10 RX ADMIN — TOPIRAMATE 250 MG: 100 TABLET, FILM COATED ORAL at 12:05

## 2017-10-10 NOTE — ED NOTES
Spoke with Taylor from [address] [place] [address] and updated on patients condition and transfer to [facility]  Lynn Fernandez stated she would call and update patients family  Lynn Fernandez stated that caregiver only needs to stay up transfer team arrives        Claudetta Cadet, RN  10/10/17 7414

## 2017-10-10 NOTE — SPEECH THERAPY NOTE
Speech/Language Pathology  Assessment    Patient Name: Valentin Lopez  [id number] Date: 10/10/2017     Problem List  Patient Active Problem List   Diagnosis    Sepsis ([facility] )    Seizure ([facility] )    Dysphagia    HCAP (healthcare-associated pneumonia)    Seizure disorder ([facility] )    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[facility] )    Lactic acidosis    Positive blood culture    Gastrostomy tube obstruction (HCC)     Past Medical History  Past Medical History:   Diagnosis Date    ADHD     Anoxic brain damage ([facility] )     Autistic disorder     Epilepsy ([facility] )     Hyperammonemia (HCC)     Hyperkeratosis     Hypotension     Hypotension     Intellectual disability     Intellectual disability     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with tonic seizures (HCC)     Lethargy     Liver enzyme elevation     Onychomycosis     Osteoporosis     Osteoporosis     Psychiatric disorder     anxiety    Seizures (HCC)      Past Surgical History  Past Surgical History:   Procedure Laterality Date    CARDIAC PACEMAKER PLACEMENT      JEJUNOSTOMY FEEDING TUBE      PEG TUBE PLACEMENT       42-year-old female presents from nursing home for evaluation of fever   Patient has recurrent sepsis last source was pneumonia   Nursing home states that this evening at around midnight the patient was found have a temperature of 101° and was given 650 mg of Tylenol   Patient is nonverbal at baseline and exhibits no signs or symptoms of severe distress upon my evaluation   Rectal temperature at present is 100 7  She was observed  to have low blood pressure  No chest pain no nausea no vomiting  She was observed to have 2 episodes of seizures    Patient is nonverbal also most of the information was obtained from the ER records    Reason for consult:  R/o aspiration  Determine safest and least restrictive diet    Precautions:  Continual observation    Current diet:  Level 3 w/ thin, pt has slept all day & note received anything  Premorbid diet[de-identified]  Finger foods, thin liquids - will eat well depending on what staff member is feeding her per the staff at [facility]  Pt has PEG tube,  pt received PEG possibly 2* poor po intake  Previous VBS:  None noted  O2 requirement:  RA  Voice/Speech:  nonverbal  Social:  [facility]  Follows commands:                 no         Cognitive Status:  lethargic  Oral mech exam:  Partial dentition  Unable to assess full oral mech  Items administered:  Puree, soft solid, thin liquids  Liquids were taken by straw  Oral stage:  Min opening for the straw & for a soft solid but did open to take min amts  Weak draw from the straw  Pharyngeal stage:  Swallow promptness: fairly prompt once completed the oral transfer  Laryngeal rise: reduced, head is downward on chest, unable to reposition  Wet voice: no voicing  Throat clear: none  Cough: none    Esophageal stage:  No s/s reported    Summary:  Pt presents w/ h/o poor po intake  Took min amts at the bedside, is not fully alert to take a larger amt  Recommendations:  Diet: continue the level 3 here for now  Liquid: thin  Meds: via feeding tube  Would offer only small amts, order what is finger foods on the menu  Maybe one item w/ one juice or drink  Positioning:Upright    Aspiration precautions  Reflux precautions    Aspiration precautions posted    Results d/w:  Pt,nsg    Will f/u briefly to assess if she eats  SHe may not eat anything

## 2017-10-10 NOTE — H&P
History and Physical - [facility] Internal Medicine    Patient Information: Linnea Boles 40 y o  female MRN: [id number]  Unit/Bed#: [facility] 890-25 Encounter: [id number]  Admitting Physician: Zackery Kocher, MD  PCP: Evaristo Pillai DO  Date of Admission:  10/10/17    Assessment/Plan:    Hospital Problem List:     Principal Problem:    HCAP (healthcare-associated pneumonia)  Active Problems:    Sepsis ([facility] )    Seizure ([facility] )    Dysphagia    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[facility] )      Plan for the Primary Problem(s):  · 1  Sepsis- mostlikley secondary to HCAP- will place on vancomycin and cefepime  Will f/u Ct chest  · 2  H/o of seizure disorder- on valproic acid, topamax and lamictal  · 3  Dysphagia- has PEG  · 4   Lennox gastuat syndrome- on rufinamide  · 5  Hypotension- on IVF  BP improved  ·     Plan for Additional Problems:   ·     VTE Prophylaxis: Enoxaparin (Lovenox)  / sequential compression device   Code Status: Full  POLST: There is no POLST form on file for this patient (pre-hospital)    Anticipated Length of Stay:  Patient will be admitted on an Inpatient basis with an anticipated length of stay of  more 2 midnights  Justification for Hospital Stay: pneumonia    Total Time for Visit, including Counseling / Coordination of Care: 30 minutes  Greater than 50% of this total time spent on direct patient counseling and coordination of care  Chief Complaint:   Fever, SOB    History of Present Illness:    Linnea Boles is a 40 y o  female who presents with history of seizure, intellectual disability, dysphagia s/p PEG was transferred from [facility] for fever and SOB  Patient found to have sepsis secondary to possible pneumonia and transferred for further management  Patient nonverbal and history taken from ER notes      Review of Systems:    Review of Systems   Unable to perform ROS: Patient nonverbal       Past Medical and Surgical History:     Past Medical History:   Diagnosis Date    ADHD     [facility] ([facility] )     Autistic disorder     Epilepsy ([place] [address] )     Hyperammonemia (HCC)     Hyperkeratosis     Hypotension     Hypotension     Intellectual disability     Intellectual disability     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with tonic seizures (HCC)     Lethargy     Liver enzyme elevation     Onychomycosis     Osteoporosis     Osteoporosis     Psychiatric disorder     anxiety    Seizures (HCC)        Past Surgical History:   Procedure Laterality Date    CARDIAC PACEMAKER PLACEMENT      JEJUNOSTOMY FEEDING TUBE      PEG TUBE PLACEMENT         Meds/Allergies:    Prior to Admission medications    Medication Sig Start Date End Date Taking? Authorizing Provider   acetaminophen (TYLENOL) 325 mg tablet 650 mg by Per G Tube route every 6 (six) hours as needed for mild pain or fever      Historical Provider, MD   bisacodyl (BISAC-EVAC) 10 mg suppository Insert 10 mg into the rectum as needed for constipation Indications: if no BM on day 4      Historical Provider, MD   cloBAZam (ONFI) tablet 20 mg by Per G Tube route 2 (two) times a day    Historical Provider, MD   lactulose (CEPHULAC) 10 G packet 30 mL by Per G Tube route 2 (two) times a day      Historical Provider, MD   LAMOTRIGINE  mg by Per G Tube route 2 (two) times a day      Historical Provider, MD   levOCARNitine (CARNITOR) 1 g/10 mL solution Take 100 mg by mouth 3 (three) times a day      Historical Provider, MD   magnesium hydroxide (MILK OF MAGNESIA) 400 mg/5 mL oral suspension 30 mL by Per G Tube route as needed for constipation      Historical Provider, MD   Multiple Vitamins-Minerals (CENTRUM SILVER PO) 1 tablet by Per G Tube route daily    Historical Provider, MD   ofloxacin (OCUFLOX) 0 3 % ophthalmic solution 1 drop 4 (four) times a day    Historical Provider, MD   ondansetron (ZOFRAN) 4 mg tablet Take 4 mg by mouth every 8 (eight) hours as needed for nausea or vomiting    Historical Provider, MD   ondansetron (ZOFRAN-ODT) 4 mg disintegrating tablet Take 1 tablet by mouth every 8 (eight) hours as needed for nausea or vomiting 6/9/17   Erica Mariscal MD   Probiotic Product (ALEXANDER-BID PROBIOTIC PO) Take by mouth    Historical Provider, MD   Rufinamide (BANZEL PO) 1,600 mg by Per G Tube route 2 (two) times a day      Historical Provider, MD   Sennosides-Docusate Sodium (SENEXON-S PO) 1 tablet by Per G Tube route daily      Historical Provider, MD   sodium phosphate (FLEET) enema Insert 1 enema into the rectum as needed Indications: if no BM on day 5  follow package directions    Historical Provider, MD   topiramate (TOPAMAX) 200 MG tablet 250 mg by Per G Tube route 2 (two) times a day      Historical Provider, MD   Valproate Sodium (VALPROIC ACID) 250 MG/5ML SOLN 5 mL by Per G Tube route every 8 (eight) hours      Historical Provider, MD   White Petrolatum-Mineral Oil (SYSTANE NIGHTTIME) OINT Apply 1 application to eye 3 (three) times a day Both eyes     Historical Provider, MD     I have reviewed home medications with patient family member  Allergies: Allergies   Allergen Reactions    Felbamate        Social History:     Marital Status: Single   Occupation:   Patient Pre-hospital Living Situation: NH  Patient Pre-hospital Level of Mobility: bedbound  Patient Pre-hospital Diet Restrictions:   Substance Use History:   History   Alcohol Use No     History   Smoking Status    Never Smoker   Smokeless Tobacco    Never Used     History   Drug Use No       Family History:    non-contributory    Physical Exam:     Vitals:   Blood Pressure: (!) 86/61 (10/10/17 1505)  Pulse: 81 (10/10/17 1505)  Temperature: 98 1 °F (36 7 °C) (10/10/17 1505)  Temp Source: Axillary (10/10/17 1505)  Respirations: 18 (10/10/17 1505)  Height: 5' 4" (162 6 cm) (10/10/17 1028)  Weight - Scale: 46 4 kg (102 lb 4 7 oz) (10/10/17 1028)  SpO2: 95 % (10/10/17 1505)    Physical Exam   HENT:   Head: Normocephalic and atraumatic     Eyes: Conjunctivae and EOM are normal  Pupils are equal, round, and reactive to light  Neck: Normal range of motion  Neck supple  No JVD present  No tracheal deviation present  No thyromegaly present  Cardiovascular: Normal rate, regular rhythm and normal heart sounds  Exam reveals no gallop and no friction rub  No murmur heard  Pulmonary/Chest: Effort normal  She has no wheezes  She has no rales  Decreased breathsounds at bases b/l   Abdominal: Soft  Bowel sounds are normal  She exhibits no distension  There is no tenderness  There is no rebound  +PEG   Musculoskeletal: Normal range of motion  She exhibits no edema  Neurological: She is alert  Vitals reviewed  Additional Data:     Lab Results: I have personally reviewed pertinent reports  Results from last 7 days  Lab Units 10/10/17  0423   WBC Thousand/uL 25 23*   HEMOGLOBIN g/dL 13 0   HEMATOCRIT % 39 7   PLATELETS Thousands/uL 154   NEUTROS PCT % 85*   LYMPHS PCT % 7*   MONOS PCT % 8   EOS PCT % 0       Results from last 7 days  Lab Units 10/10/17  0423   SODIUM mmol/L 142   POTASSIUM mmol/L 3 7   CHLORIDE mmol/L 107   CO2 mmol/L 20*   BUN mg/dL 16   CREATININE mg/dL 0 60   CALCIUM mg/dL 8 5   TOTAL PROTEIN g/dL 6 8   BILIRUBIN TOTAL mg/dL 0 20   ALK PHOS U/L 93   ALT U/L 34   AST U/L 22   GLUCOSE RANDOM mg/dL 141*       Results from last 7 days  Lab Units 10/10/17  0423   INR  1 05       Imaging: I have personally reviewed pertinent reports  Xr Chest Portable - 1 View    Result Date: 10/10/2017  Narrative: CHEST  PORTABLE INDICATION: Fever, cough COMPARISON:  February 22, 2017 VIEWS:   AP semierect; IMAGES:  1 FINDINGS: Neurostimulator overlies the left anterior chest wall with electrode lead extending into left side of the neck  The cardiomediastinal silhouette is unremarkable  The lungs are clear  No pleural effusion  No pneumothorax  Bony thorax unremarkable  Impression: No active disease        Workstation performed: [id number]       EKG, Pathology, and Other Studies Reviewed on Admission:   · EKG:     Allscripts / Epic Records Reviewed: Yes     ** Please Note: This note has been constructed using a voice recognition system   **

## 2017-10-10 NOTE — ED PROVIDER NOTES
History  Chief Complaint   Patient presents with    Fever - 9 weeks to 74 years     Patient had 2 seizure tonight and was running a fever as well  Nursing home reported hypotension and that patient was lethargic  36-year-old female presents from nursing home for evaluation of fever  Patient has recurrent sepsis last source was pneumonia  Nursing home states that this evening at around midnight the patient was found have a temperature of 101° and was given 650 mg of Tylenol  Patient is nonverbal at baseline and exhibits no signs or symptoms of severe distress upon my evaluation  Rectal temperature at present is 100 7        History provided by:  EMS personnel and nursing home  Fever - 9 weeks to 74 years   Max temp prior to arrival:  101  Temp source:  Temporal  Onset quality:  Sudden  Duration:  4 hours  Timing:  Constant  Chronicity:  New  Relieved by:  Nothing  Worsened by:  Nothing  Ineffective treatments:  None tried  Associated symptoms: no chest pain, no chills, no confusion, no congestion, no dysuria, no ear pain, no headaches and no rash    Risk factors: no contaminated food, no contaminated water and no hx of cancer        Prior to Admission Medications   Prescriptions Last Dose Informant Patient Reported? Taking?    LAMOTRIGINE PO   Yes Yes   Si mg by Per G Tube route 2 (two) times a day     Multiple Vitamins-Minerals (CENTRUM SILVER PO)   Yes Yes   Si tablet by Per G Tube route daily   Probiotic Product (ALEXANDER-BID PROBIOTIC PO)   Yes Yes   Sig: Take by mouth   Rufinamide (BANZEL PO)   Yes Yes   Si,600 mg by Per G Tube route 2 (two) times a day     Sennosides-Docusate Sodium (SENEXON-S PO)   Yes Yes   Si tablet by Per G Tube route daily     Valproate Sodium (VALPROIC ACID) 250 MG/5ML SOLN   Yes Yes   Si mL by Per G Tube route every 8 (eight) hours     White Petrolatum-Mineral Oil (SYSTANE NIGHTTIME) OINT   Yes Yes   Sig: Apply 1 application to eye 3 (three) times a day Both eyes    acetaminophen (TYLENOL) 325 mg tablet 10/10/2017 at 0050  Yes Yes   Si mg by Per G Tube route every 6 (six) hours as needed for mild pain or fever     bisacodyl (BISAC-EVAC) 10 mg suppository   Yes Yes   Sig: Insert 10 mg into the rectum as needed for constipation Indications: if no BM on day 4    cloBAZam (ONFI) tablet   Yes Yes   Si mg by Per G Tube route 2 (two) times a day   lactulose (CEPHULAC) 10 G packet   Yes Yes   Si mL by Per G Tube route 2 (two) times a day     levOCARNitine (CARNITOR) 1 g/10 mL solution   Yes Yes   Sig: Take 100 mg by mouth 3 (three) times a day     magnesium hydroxide (MILK OF MAGNESIA) 400 mg/5 mL oral suspension   Yes Yes   Si mL by Per G Tube route as needed for constipation     ofloxacin (OCUFLOX) 0 3 % ophthalmic solution   Yes Yes   Si drop 4 (four) times a day   ondansetron (ZOFRAN) 4 mg tablet   Yes Yes   Sig: Take 4 mg by mouth every 8 (eight) hours as needed for nausea or vomiting   ondansetron (ZOFRAN-ODT) 4 mg disintegrating tablet   No Yes   Sig: Take 1 tablet by mouth every 8 (eight) hours as needed for nausea or vomiting   sodium phosphate (FLEET) enema   Yes Yes   Sig: Insert 1 enema into the rectum as needed Indications: if no BM on day 5  follow package directions   topiramate (TOPAMAX) 200 MG tablet   Yes Yes   Si mg by Per G Tube route 2 (two) times a day        Facility-Administered Medications: None       Past Medical History:   Diagnosis Date    ADHD     Anoxic brain damage (HCC)     Autistic disorder     Epilepsy ([facility] [address] )     Hyperammonemia (HCC)     Hyperkeratosis     Hypotension     Hypotension     Intellectual disability     Intellectual disability     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with tonic seizures (HCC)     Lethargy     Liver enzyme elevation     Onychomycosis     Osteoporosis     Osteoporosis     Psychiatric disorder     anxiety    Seizures (HCC)        Past Surgical History:   Procedure Laterality Date    CARDIAC PACEMAKER PLACEMENT      JEJUNOSTOMY FEEDING TUBE      PEG TUBE PLACEMENT         History reviewed  No pertinent family history  I have reviewed and agree with the history as documented  Social History   Substance Use Topics    Smoking status: Never Smoker    Smokeless tobacco: Never Used    Alcohol use No        Review of Systems   Constitutional: Positive for fever  Negative for chills  HENT: Negative for congestion, dental problem, drooling, ear discharge and ear pain  Eyes: Negative for pain, discharge, redness and itching  Respiratory: Negative for apnea, choking and chest tightness  Cardiovascular: Negative for chest pain  Gastrointestinal: Negative for abdominal distention, abdominal pain, anal bleeding and blood in stool  Endocrine: Negative for cold intolerance, heat intolerance and polydipsia  Genitourinary: Negative for difficulty urinating, dyspareunia, dysuria and enuresis  Musculoskeletal: Negative for arthralgias, back pain and gait problem  Skin: Negative for color change, pallor and rash  Allergic/Immunologic: Negative for environmental allergies and food allergies  Neurological: Negative for dizziness, facial asymmetry and headaches  Hematological: Negative for adenopathy  Psychiatric/Behavioral: Negative for confusion and decreased concentration  Physical Exam  ED Triage Vitals [10/10/17 0418]   Temperature Pulse Respirations Blood Pressure SpO2   (!) 100 7 °F (38 2 °C) 104 22 112/67 96 %      Temp Source Heart Rate Source Patient Position - Orthostatic VS BP Location FiO2 (%)   Rectal Monitor Lying Left arm --      Pain Score       No Pain           Physical Exam   Constitutional: No distress  HENT:   Head: Normocephalic  Right Ear: External ear normal    Left Ear: External ear normal    Eyes: Pupils are equal, round, and reactive to light  Right eye exhibits no discharge  Left eye exhibits no discharge  Neck: Normal range of motion   No tracheal deviation present  No thyromegaly present  Cardiovascular: Regular rhythm  Pulmonary/Chest: Effort normal and breath sounds normal    Abdominal: Soft  Bowel sounds are normal  She exhibits no distension  There is no tenderness  There is no guarding  Musculoskeletal: Normal range of motion  She exhibits no edema or deformity  Neurological: She displays abnormal reflex  No cranial nerve deficit  She exhibits abnormal muscle tone  Coordination abnormal    Skin: Skin is warm  Capillary refill takes less than 2 seconds  She is not diaphoretic  Psychiatric: She is not agitated  She is noncommunicative  Nursing note and vitals reviewed        ED Medications  Medications   sodium chloride 0 9 % bolus 1,000 mL (0 mL Intravenous Stopped 10/10/17 0522)     Followed by   sodium chloride 0 9 % bolus 1,000 mL (1,000 mL Intravenous New Bag 10/10/17 0524)   vancomycin (VANCOCIN) 750 mg in sodium chloride 0 9 % 250 mL IVPB (750 mg Intravenous New Bag 10/10/17 0521)   sterile water injection **AcuDose Override Pull** (not administered)   cefepime (MAXIPIME) IVPB (premix) 2,000 mg (0 mg Intravenous Stopped 10/10/17 0501)       Diagnostic Studies  Labs Reviewed   CBC AND DIFFERENTIAL - Abnormal        Result Value Ref Range Status    WBC 25 23 (*) 4 31 - 10 16 Thousand/uL Final     (*) 82 - 98 fL Final    Neutrophils Relative 85 (*) 43 - 75 % Final    Lymphocytes Relative 7 (*) 14 - 44 % Final    Neutrophils Absolute 21 41 (*) 1 85 - 7 62 Thousands/µL Final    Monocytes Absolute 1 94 (*) 0 17 - 1 22 Thousand/µL Final    RBC 3 94  3 81 - 5 12 Million/uL Final    Hemoglobin 13 0  11 5 - 15 4 g/dL Final    Hematocrit 39 7  34 8 - 46 1 % Final    MCH 33 0  26 8 - 34 3 pg Final    MCHC 32 7  31 4 - 37 4 g/dL Final    RDW 14 0  11 6 - 15 1 % Final    MPV 12 0  8 9 - 12 7 fL Final    Platelets 670  938 - 390 Thousands/uL Final    Monocytes Relative 8  4 - 12 % Final    Eosinophils Relative 0  0 - 6 % Final Basophils Relative 0  0 - 1 % Final    Lymphocytes Absolute 1 84  0 60 - 4 47 Thousands/µL Final    Eosinophils Absolute 0 01  0 00 - 0 61 Thousand/µL Final    Basophils Absolute 0 03  0 00 - 0 10 Thousands/µL Final   COMPREHENSIVE METABOLIC PANEL - Abnormal     CO2 20 (*) 21 - 32 mmol/L Final    Anion Gap 15 (*) 4 - 13 mmol/L Final    Glucose 141 (*) 65 - 140 mg/dL Final    Comment:   If the patient is fasting, the ADA then defines impaired fasting glucose as > 100 mg/dL and diabetes as > or equal to 123 mg/dL  Specimen collection should occur prior to Sulfasalazine administration due to the potential for falsely depressed results  Specimen collection should occur prior to Sulfapyridine administration due to the potential for falsely elevated results  Albumin 3 1 (*) 3 5 - 5 0 g/dL Final    Sodium 142  136 - 145 mmol/L Final    Potassium 3 7  3 5 - 5 3 mmol/L Final    Chloride 107  100 - 108 mmol/L Final    BUN 16  5 - 25 mg/dL Final    Creatinine 0 60  0 60 - 1 30 mg/dL Final    Comment: Standardized to IDMS reference method    Calcium 8 5  8 3 - 10 1 mg/dL Final    AST 22  5 - 45 U/L Final    Comment:   Specimen collection should occur prior to Sulfasalazine administration due to the potential for falsely depressed results  ALT 34  12 - 78 U/L Final    Comment:   Specimen collection should occur prior to Sulfasalazine administration due to the potential for falsely depressed results  Alkaline Phosphatase 93  46 - 116 U/L Final    Total Protein 6 8  6 4 - 8 2 g/dL Final    Total Bilirubin 0 20  0 20 - 1 00 mg/dL Final    eGFR 118  ml/min/1 73sq m Final    Narrative:     National Kidney Disease Education Program recommendations are as follows:  GFR calculation is accurate only with a steady state creatinine  Chronic Kidney disease less than 60 ml/min/1 73 sq  meters  Kidney failure less than 15 ml/min/1 73 sq  meters     LACTIC ACID, PLASMA - Abnormal     LACTIC ACID 2 7 (*) 0 5 - 2 0 mmol/L Final Narrative:     Result may be elevated if tourniquet was used during collection  UA W REFLEX TO MICROSCOPIC WITH REFLEX TO CULTURE - Abnormal     Ketones, UA 15 (1+) (*) Negative mg/dl Final    Color, UA Yellow   Final    Clarity, UA Clear   Final    Specific Gravity, UA 1 025  1 003 - 1 030 Final    pH, UA 5 5  4 5 - 8 0 Final    Leukocytes, UA Negative  Negative Final    Nitrite, UA Negative  Negative Final    Protein, UA Negative  Negative mg/dl Final    Glucose, UA Negative  Negative mg/dl Final    Urobilinogen, UA 0 2  0 2, 1 0 E U /dl E U /dl Final    Bilirubin, UA Negative  Negative Final    Blood, UA Negative  Negative, Trace-Intact Final   VALPROIC ACID LEVEL, TOTAL - Abnormal     Valproic Acid, Total 124 (*) 50 - 100 ug/mL Final   PROTIME-INR - Normal    Protime 13 6  12 1 - 14 4 seconds Final    INR 1 05  0 86 - 1 16 Final   APTT - Normal    PTT 31  23 - 35 seconds Final    Narrative: Therapeutic Heparin Range = 60-90 seconds   LIPASE - Normal    Lipase 189  73 - 393 u/L Final   TROPONIN I - Normal    Troponin I <0 02  <=0 04 ng/mL Final    Comment: 3Autovalidation override    Narrative:     [employer] Chemistry analyzer 99% cutoff is > 0 04 ng/mL in network labs    o cTnI 99% cutoff is useful only when applied to patients in the clinical setting of myocardial ischemia  o cTnI 99% cutoff should be interpreted in the context of clinical history, ECG findings and possibly cardiac imaging to establish correct diagnosis  o cTnI 99% cutoff may be suggestive but clearly not indicative of a coronary event without the clinical setting of myocardial ischemia     BLOOD CULTURE   BLOOD CULTURE   LACTIC ACID, PLASMA   LACTIC ACID, PLASMA       XR chest portable - 1 view    (Results Pending)       Procedures  Procedures      Phone Contacts  ED Phone Contact    ED Course  ED Course as of Oct 10 0647   Tue Oct 10, 2017   0454 NO INFILTRATE  XR chest portable - 1 view         HEART Risk Score    Flowsheet Row Most Recent Value   History  1 Filed at: 10/10/2017 0440   ECG  0 Filed at: 10/10/2017 0440   Age  0 Filed at: 10/10/2017 0440   Risk Factors  0 Filed at: 10/10/2017 0440   Troponin  0 Filed at: 10/10/2017 0440   Heart Score Risk Calculator   History  1 Filed at: 10/10/2017 0440   ECG  0 Filed at: 10/10/2017 0440   Age  0 Filed at: 10/10/2017 0440   Risk Factors  0 Filed at: 10/10/2017 0440   Troponin  0 Filed at: 10/10/2017 0440   HEART Score  1 Filed at: 10/10/2017 0440   HEART Score  1 Filed at: 10/10/2017 0440                            MDM  Number of Diagnoses or Management Options  Aspiration pneumonia [facility]):   Lactic acidosis:   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[facility] [address] ):   Seizure ([facility] [address] ):   Sepsis [facility]):   Diagnosis management comments: Had sepsis differential diagnosis 1  UTI 2  Pneumonia 3  Occult bacteremia  I will perform chest x-ray urinalysis blood cultures lactic acid provide 30 cc/kilogram fluid bolus as well as broad-spectrum antibiotic coverage  05:50   Patient was seen by Hospitalist who wanted patient in the ICU  There are no ICU beds so she will need to be transferred to [facility] to accept to Step Down 2  , she would like fluids stopped at 2 L and Lactic acid repeated   At this point the Intensivists will not be consulted     06:35  [facility] now asked the [address] that I speak with Dr Ervin Avery who I reviewed the case with    He asked that the patient be admitted to [facility]            Amount and/or Complexity of Data Reviewed  Clinical lab tests: reviewed  Tests in the radiology section of CPT®: reviewed  Tests in the medicine section of CPT®: reviewed    Risk of Complications, Morbidity, and/or Mortality  Presenting problems: high  Diagnostic procedures: high  Management options: high      The patient presented with a condition in which there was a high probability of imminent or life-threatening deterioration, and critical care services (excluding separately billable procedures) totalled 30-74 minutes (Critical care - 35 minutes )  Disposition  Final diagnoses:   Sepsis ([facility] [address] )   Lactic acidosis   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HCC)   Seizure ([facility] [address] )   Aspiration pneumonia [facility])     ED Disposition     ED Disposition Condition Comment    Transfer to Another Facility          MD Documentation    Tung Vance Most Recent Value   Patient Condition  The patient has been stabilized such that within reasonable medical probability, no material deterioration of the patient condition or the condition of the unborn child(aki) is likely to result from the transfer   Reason for Transfer  Level of Care needed not available at this facility   Benefits of Transfer  Specialized equipment and/or services available at the receiving facility (Include comment)________________________   Accepting Physician  DR Yud[address] Name, [address]     Provider Certification  General risk, such as traffic hazards, adverse weather conditions, rough terrain or turbulence, possible failure of equipment (including vehicle or aircraft), or consequences of actions of persons outside the control of the transport personnel      RN Documentation    Flowsheet Row Most [facility] Name, [address]   SLB    Level of Care  Advanced life support      Follow-up Information    None       Patient's Medications   Discharge Prescriptions    No medications on file     No discharge procedures on file      ED Provider  Electronically Signed by       Teresa Valdez,   10/10/17 Cory 36, DO  10/10/17 [address] [facility],   10/10/17 8049

## 2017-10-10 NOTE — EMTALA/ACUTE CARE TRANSFER
[address] [place] [address]  Dept: [phone]      [facility] TRANSFER CONSENT    NAME Isidro Mena                                         1980                              MRN [id number]    I have been informed of my rights regarding examination, treatment, and transfer   by Dr Aaliyah Villela DO    Benefits: Specialized equipment and/or services available at the receiving facility (Include comment)________________________    Risks:  Accident       Transfer Request   I acknowledge that my medical condition has been evaluated and explained to me by the emergency department physician or other qualified medical person and/or my attending physician who has recommended and offered to me further medical examination and treatment  I understand the Hospital's obligation with respect to the treatment and stabilization of my emergency medical condition  I nevertheless request to be transferred  I release the Hospital, the doctor, and any other persons caring for me from all responsibility or liability for any injury or ill effects that may result from my transfer and agree to accept all responsibility for the consequences of my choice to transfer, rather than receive stabilizing treatment at the Hospital  I understand that because the transfer is my request, my insurance may not provide reimbursement for the services  The Hospital will assist and direct me and my family in how to make arrangements for transfer, but the hospital is not liable for any fees charged by the transport service  In spite of this understanding, I refuse to consent to further medical examination and treatment which has been offered to me, and request transfer to    I authorize the performance of emergency medical procedures and treatments upon me in both transit and upon arrival at the receiving facility    Additionally, I authorize the release of any and all medical records to the receiving facility and request they be transported with me, if possible  I authorize the performance of emergency medical procedures and treatments upon me in both transit and upon arrival at the receiving facility  Additionally, I authorize the release of any and all medical records to the receiving facility and request they be transported with me, if possible  I understand that the safest mode of transportation during a medical emergency is an ambulance and that the Hospital advocates the use of this mode of transport  Risks of traveling to the receiving facility by car, including absence of medical control, life sustaining equipment, such as oxygen, and medical personnel has been explained to me and I fully understand them  (THOMAS CORRECT BOX BELOW)  [  ]  I consent to the stated transfer and to be transported by ambulance/helicopter  [  ]  I consent to the stated transfer, but refuse transportation by ambulance and accept full responsibility for my transportation by car  I understand the risks of non-ambulance transfers and I exonerate the Hospital and its staff from any deterioration in my condition that results from this refusal     X___________________________________________    DATE  10/10/17  TIME________  Signature of patient or legally responsible individual signing on patient behalf           RELATIONSHIP TO PATIENT_________________________          Provider Certification    NAME Kiet Solorzano [facility] 1980                              MRN [id number]    A medical screening exam was performed on the above named patient  Based on the examination:    Condition Necessitating Transfer The primary encounter diagnosis was Sepsis ([facility] [address] )  Diagnoses of Lactic acidosis, 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[facility] [address] ), and Seizure ([facility] [address] ) were also pertinent to this visit      Patient Condition: The patient has been stabilized such that within reasonable medical probability, no material deterioration of the patient condition or the condition of the unborn child(aki) is likely to result from the transfer    Reason for Transfer: Level of Care needed not available at this facility    Transfer Requirements: Facility     · Space available and qualified personnel available for treatment as acknowledged by    · Agreed to accept transfer and to provide appropriate medical treatment as acknowledged by          · Appropriate medical records of the examination and treatment of the patient are provided at the time of transfer   [facility], [address] _______  · Transfer will be performed by qualified personnel from    and appropriate transfer equipment as required, including the use of necessary and appropriate life support measures  Provider Certification: I have examined the patient and explained the following risks and benefits of being transferred/refusing transfer to the patient/family:  General risk, such as traffic hazards, adverse weather conditions, rough terrain or turbulence, possible failure of equipment (including vehicle or aircraft), or consequences of actions of persons outside the control of the transport personnel      Based on these reasonable risks and benefits to the patient and/or the unborn child(aki), and based upon the information available at the time of the patients examination, I certify that the medical benefits reasonably to be expected from the provision of appropriate medical treatments at another medical facility outweigh the increasing risks, if any, to the individuals medical condition, and in the case of labor to the unborn child, from effecting the transfer      X____________________________________________ DATE 10/10/17        TIME_______      ORIGINAL - SEND TO MEDICAL RECORDS   COPY - SEND WITH PATIENT DURING TRANSFER

## 2017-10-10 NOTE — PLAN OF CARE
DISCHARGE PLANNING     Discharge to home or other facility with appropriate resources Progressing        INFECTION - ADULT     Absence or prevention of progression during hospitalization Progressing     Absence of fever/infection during neutropenic period Progressing        Knowledge Deficit     Patient/family/caregiver demonstrates understanding of disease process, treatment plan, medications, and discharge instructions Progressing        METABOLIC, FLUID AND ELECTROLYTES - ADULT     Electrolytes maintained within normal limits Progressing     Fluid balance maintained Progressing        NEUROSENSORY - ADULT     Achieves stable or improved neurological status Progressing     Absence of seizures Progressing     Remains free of injury related to seizures activity Progressing     Achieves maximal functionality and self care Progressing        Potential for Falls     Patient will remain free of falls Progressing        Prexisting or High Potential for Compromised Skin Integrity     Skin integrity is maintained or improved Progressing        RESPIRATORY - ADULT     Achieves optimal ventilation and oxygenation Progressing        SAFETY ADULT     Maintain or return to baseline ADL function Progressing     Maintain or return mobility status to optimal level Progressing

## 2017-10-10 NOTE — H&P
H&P Exam - Pam Fulling 40 y o  female MRN: [id number]    Unit/Bed#: ZR74 Encounter: [id number]    Assessment:  Hypotension  Sepsis/fever  seizure    Plan:  1  Sepsis-- with low blood pressure, unknown source                      --- continue with  vancomycin and cefepime                          --IV fluids                         --ICU placement                         -- critical care consult                           ---blood and urine cultures  2 seizures--  continue with Lamictal, Ativan p r n  3 lennox gastaut syndrome -- c/w   Current therapy  4  Disposition-- full code  5  DVT prophylaxis-- subcu heparin        History of Present Illness    Fever -       Patient had 2 seizure tonight and was running a fever as well  Nursing home reported hypotension and that patient was lethargic  64-year-old female presents from nursing home for evaluation of fever  Patient has recurrent sepsis last source was pneumonia  Nursing home states that this evening at around midnight the patient was found have a temperature of 101° and was given 650 mg of Tylenol  Patient is nonverbal at baseline and exhibits no signs or symptoms of severe distress upon my evaluation  Rectal temperature at present is 100 7  She was observed  to have low blood pressure  No chest pain no nausea no vomiting  She was observed to have 2 episodes of seizures  Patient is nonverbal also most of the information was obtained from the ER records         Review of Systems   Constitutional: Positive for activity change  HENT: Negative  Eyes: Negative  Respiratory: Negative  Cardiovascular: Negative  Gastrointestinal: Negative  Endocrine: Negative  Genitourinary: Negative  Musculoskeletal: Negative  Allergic/Immunologic: Negative  Neurological: Positive for seizures  Hematological: Negative  Psychiatric/Behavioral: Negative          Historical Information   Past Medical History:   Diagnosis Date    ADHD  Anoxic brain damage (HCC)     Autistic disorder     Epilepsy ([facility] [address] )     Hyperammonemia (HCC)     Hyperkeratosis     Hypotension     Hypotension     Intellectual disability     Intellectual disability     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with tonic seizures (HCC)     Lethargy     Liver enzyme elevation     Onychomycosis     Osteoporosis     Osteoporosis     Psychiatric disorder     anxiety    Seizures (HCC)      Past Surgical History:   Procedure Laterality Date    CARDIAC PACEMAKER PLACEMENT      JEJUNOSTOMY FEEDING TUBE      PEG TUBE PLACEMENT       Social History   History   Alcohol Use No     History   Drug Use No     History   Smoking Status    Never Smoker   Smokeless Tobacco    Never Used     Family History: non-contributory    Meds/Allergies   all medications and allergies reviewed  Allergies   Allergen Reactions    Felbamate        Objective   First Vitals:   Blood Pressure: 112/67 (10/10/17 0418)  Pulse: 104 (10/10/17 0418)  Temperature: (!) 100 7 °F (38 2 °C) (10/10/17 0418)  Temp Source: Rectal (10/10/17 0418)  Respirations: 22 (10/10/17 0418)  Height: 5' 4" (162 6 cm) (10/10/17 0418)  Weight - Scale: 46 4 kg (102 lb 4 7 oz) (10/10/17 0418)  SpO2: 96 % (10/10/17 0418)    Current Vitals:   Blood Pressure: 90/54 (10/10/17 0500)  Pulse: 95 (10/10/17 0500)  Temperature: (!) 100 7 °F (38 2 °C) (10/10/17 0418)  Temp Source: Rectal (10/10/17 0418)  Respirations: 18 (10/10/17 0500)  Height: 5' 4" (162 6 cm) (10/10/17 0418)  Weight - Scale: 46 4 kg (102 lb 4 7 oz) (10/10/17 0418)  SpO2: 92 % (10/10/17 0500)      Intake/Output Summary (Last 24 hours) at 10/10/17 0572  Last data filed at 10/10/17 0522   Gross per 24 hour   Intake             1050 ml   Output                0 ml   Net             1050 ml       Invasive Devices     Peripheral Intravenous Line            Peripheral IV 10/10/17 Left Arm less than 1 day    Peripheral IV 10/10/17 Right Antecubital less than 1 day          Drain Gastrostomy/Enterostomy Gastrostomy 18 Fr  LLQ 84 days          Nasogastric/Orogastric Tube            Feeding Tube 250 days                Physical Exam   Constitutional: She appears well-developed  HENT:   Head: Normocephalic and atraumatic  Right Ear: External ear normal    Left Ear: External ear normal    Nose: Nose normal    Mouth/Throat: Oropharynx is clear and moist    Eyes: Conjunctivae and EOM are normal  Pupils are equal, round, and reactive to light  Neck: Normal range of motion  Neck supple  Cardiovascular: Normal rate, regular rhythm, normal heart sounds and intact distal pulses  Pulmonary/Chest: Effort normal and breath sounds normal    Abdominal: Soft  Bowel sounds are normal    Peg tub clean   Musculoskeletal: Normal range of motion  Neurological:   nonverbal   Skin: Skin is warm and dry  Psychiatric:   Non verbal   Nursing note and vitals reviewed        Lab Results:   Labs Reviewed   CBC AND DIFFERENTIAL - Abnormal        Result Value Ref Range Status     WBC 25 23 (*) 4 31 - 10 16 Thousand/uL Final      (*) 82 - 98 fL Final     Neutrophils Relative 85 (*) 43 - 75 % Final     Lymphocytes Relative 7 (*) 14 - 44 % Final     Neutrophils Absolute 21 41 (*) 1 85 - 7 62 Thousands/µL Final     Monocytes Absolute 1 94 (*) 0 17 - 1 22 Thousand/µL Final     RBC 3 94  3 81 - 5 12 Million/uL Final     Hemoglobin 13 0  11 5 - 15 4 g/dL Final     Hematocrit 39 7  34 8 - 46 1 % Final     MCH 33 0  26 8 - 34 3 pg Final     MCHC 32 7  31 4 - 37 4 g/dL Final     RDW 14 0  11 6 - 15 1 % Final     MPV 12 0  8 9 - 12 7 fL Final     Platelets 595  214 - 390 Thousands/uL Final     Monocytes Relative 8  4 - 12 % Final     Eosinophils Relative 0  0 - 6 % Final     Basophils Relative 0  0 - 1 % Final     Lymphocytes Absolute 1 84  0 60 - 4 47 Thousands/µL Final     Eosinophils Absolute 0 01  0 00 - 0 61 Thousand/µL Final     Basophils Absolute 0 03  0 00 - 0 10 Thousands/µL Final   COMPREHENSIVE METABOLIC PANEL - Abnormal      CO2 20 (*) 21 - 32 mmol/L Final     Anion Gap 15 (*) 4 - 13 mmol/L Final     Glucose 141 (*) 65 - 140 mg/dL Final     Comment:    If the patient is fasting, the ADA then defines impaired fasting glucose as > 100 mg/dL and diabetes as > or equal to 123 mg/dL  Specimen collection should occur prior to Sulfasalazine administration due to the potential for falsely depressed results  Specimen collection should occur prior to Sulfapyridine administration due to the potential for falsely elevated results  Albumin 3 1 (*) 3 5 - 5 0 g/dL Final     Sodium 142  136 - 145 mmol/L Final     Potassium 3 7  3 5 - 5 3 mmol/L Final     Chloride 107  100 - 108 mmol/L Final     BUN 16  5 - 25 mg/dL Final     Creatinine 0 60  0 60 - 1 30 mg/dL Final     Comment: Standardized to IDMS reference method     Calcium 8 5  8 3 - 10 1 mg/dL Final     AST 22  5 - 45 U/L Final     Comment:    Specimen collection should occur prior to Sulfasalazine administration due to the potential for falsely depressed results  ALT 34  12 - 78 U/L Final     Comment:    Specimen collection should occur prior to Sulfasalazine administration due to the potential for falsely depressed results  Alkaline Phosphatase 93  46 - 116 U/L Final     Total Protein 6 8  6 4 - 8 2 g/dL Final     Total Bilirubin 0 20  0 20 - 1 00 mg/dL Final     eGFR 118  ml/min/1 73sq m Final     Narrative:      National Kidney Disease Education Program recommendations are as follows:  GFR calculation is accurate only with a steady state creatinine  Chronic Kidney disease less than 60 ml/min/1 73 sq  meters  Kidney failure less than 15 ml/min/1 73 sq  meters  LACTIC ACID, PLASMA - Abnormal      LACTIC ACID 2 7 (*) 0 5 - 2 0 mmol/L Final     Narrative:      Result may be elevated if tourniquet was used during collection     UA W REFLEX TO MICROSCOPIC WITH REFLEX TO CULTURE - Abnormal      Ketones, UA 15 (1+) (*) Negative mg/dl Final     Color, UA Yellow    Final     Clarity, UA Clear    Final     Specific Gravity, UA 1 025  1 003 - 1 030 Final     pH, UA 5 5  4 5 - 8 0 Final     Leukocytes, UA Negative  Negative Final     Nitrite, UA Negative  Negative Final     Protein, UA Negative  Negative mg/dl Final     Glucose, UA Negative  Negative mg/dl Final     Urobilinogen, UA 0 2  0 2, 1 0 E U /dl E U /dl Final     Bilirubin, UA Negative  Negative Final     Blood, UA Negative  Negative, Trace-Intact Final   PROTIME-INR - Normal     Protime 13 6  12 1 - 14 4 seconds Final     INR 1 05  0 86 - 1 16 Final   APTT - Normal     PTT 31  23 - 35 seconds Final     Narrative: Therapeutic Heparin Range = 60-90 seconds   LIPASE - Normal     Lipase 189  73 - 393 u/L Final   TROPONIN I - Normal     Troponin I <0 02  <=0 04 ng/mL Final     Comment: 3Autovalidation override     Narrative:      [employer] Chemistry analyzer 99% cutoff is > 0 04 ng/mL in network labs     o cTnI 99% cutoff is useful only when applied to patients in the clinical setting of myocardial ischemia  o cTnI 99% cutoff should be interpreted in the context of clinical history, ECG findings and possibly cardiac imaging to establish correct diagnosis  o cTnI 99% cutoff may be suggestive but clearly not indicative of a coronary event without the clinical setting of myocardial ischemia  BLOOD CULTURE   BLOOD CULTURE   LACTIC ACID, PLASMA   VALPROIC ACID LEVEL, TOTAL                   Imaging: cxr -- nad  EKG, Pathology, and Other Studies:  nsr @ 92 bpm    Code Status: Prior  Advance Directive and Living Will:      Power of :    POLST:      Counseling / Coordination of Care:   Greater than 50% of total time was spent with the patient and / or family counseling and / or coordination of care  A description of the counseling / coordination of care: 61

## 2017-10-10 NOTE — ED PROCEDURE NOTE
PROCEDURE  ECG 12 Lead Documentation  Date/Time: 10/10/2017 4:50 AM  Performed by: Sunita Lopez  Authorized by: Sunita Lopez     Indications / Diagnosis:  SEPSIS   ECG reviewed by me, the ED Provider: yes    Patient location:  ED  Previous ECG:     Previous ECG:  Unavailable  Interpretation:     Interpretation: non-specific    Rate:     ECG rate:  92    ECG rate assessment: normal    Rhythm:     Rhythm: sinus rhythm    ST segments:     ST segments:  Non-specific

## 2017-10-11 LAB
ANION GAP SERPL CALCULATED.3IONS-SCNC: 7 MMOL/L (ref 4–13)
BUN SERPL-MCNC: 4 MG/DL (ref 5–25)
CALCIUM SERPL-MCNC: 8 MG/DL (ref 8.3–10.1)
CHLORIDE SERPL-SCNC: 113 MMOL/L (ref 100–108)
CO2 SERPL-SCNC: 23 MMOL/L (ref 21–32)
CREAT SERPL-MCNC: 0.31 MG/DL (ref 0.6–1.3)
ERYTHROCYTE [DISTWIDTH] IN BLOOD BY AUTOMATED COUNT: 14 % (ref 11.6–15.1)
GFR SERPL CREATININE-BSD FRML MDRD: 146 ML/MIN/1.73SQ M
GLUCOSE SERPL-MCNC: 83 MG/DL (ref 65–140)
HCT VFR BLD AUTO: 32.5 % (ref 34.8–46.1)
HGB BLD-MCNC: 10.7 G/DL (ref 11.5–15.4)
MCH RBC QN AUTO: 33.3 PG (ref 26.8–34.3)
MCHC RBC AUTO-ENTMCNC: 32.9 G/DL (ref 31.4–37.4)
MCV RBC AUTO: 101 FL (ref 82–98)
MRSA NOSE QL CULT: NORMAL
PLATELET # BLD AUTO: 129 THOUSANDS/UL (ref 149–390)
PMV BLD AUTO: 11.8 FL (ref 8.9–12.7)
POTASSIUM SERPL-SCNC: 3.6 MMOL/L (ref 3.5–5.3)
RBC # BLD AUTO: 3.21 MILLION/UL (ref 3.81–5.12)
SODIUM SERPL-SCNC: 143 MMOL/L (ref 136–145)
VALPROATE SERPL-MCNC: 51 UG/ML (ref 50–100)
WBC # BLD AUTO: 9.98 THOUSAND/UL (ref 4.31–10.16)

## 2017-10-11 PROCEDURE — 80164 ASSAY DIPROPYLACETIC ACD TOT: CPT | Performed by: PSYCHIATRY & NEUROLOGY

## 2017-10-11 PROCEDURE — 80175 DRUG SCREEN QUAN LAMOTRIGINE: CPT | Performed by: PSYCHIATRY & NEUROLOGY

## 2017-10-11 PROCEDURE — 80048 BASIC METABOLIC PNL TOTAL CA: CPT | Performed by: INTERNAL MEDICINE

## 2017-10-11 PROCEDURE — 80201 ASSAY OF TOPIRAMATE: CPT | Performed by: PSYCHIATRY & NEUROLOGY

## 2017-10-11 PROCEDURE — 85027 COMPLETE CBC AUTOMATED: CPT | Performed by: INTERNAL MEDICINE

## 2017-10-11 PROCEDURE — 80339 ANTIEPILEPTICS NOS 1-3: CPT | Performed by: PSYCHIATRY & NEUROLOGY

## 2017-10-11 RX ORDER — CLOBAZAM 10 MG/1
20 TABLET ORAL 2 TIMES DAILY
Status: DISCONTINUED | OUTPATIENT
Start: 2017-10-11 | End: 2017-10-12

## 2017-10-11 RX ORDER — ALBUMIN (HUMAN) 12.5 G/50ML
12.5 SOLUTION INTRAVENOUS ONCE
Status: COMPLETED | OUTPATIENT
Start: 2017-10-11 | End: 2017-10-11

## 2017-10-11 RX ADMIN — TOPIRAMATE 250 MG: 100 TABLET, FILM COATED ORAL at 08:41

## 2017-10-11 RX ADMIN — SODIUM CHLORIDE 1000 ML: 0.9 INJECTION, SOLUTION INTRAVENOUS at 05:08

## 2017-10-11 RX ADMIN — VALPROIC ACID 250 MG: 250 SOLUTION ORAL at 04:07

## 2017-10-11 RX ADMIN — CEFEPIME HYDROCHLORIDE 2000 MG: 2 INJECTION, POWDER, FOR SOLUTION INTRAVENOUS at 11:56

## 2017-10-11 RX ADMIN — CLOBAZAM 20 MG: 10 TABLET ORAL at 17:48

## 2017-10-11 RX ADMIN — TOPIRAMATE 250 MG: 100 TABLET, FILM COATED ORAL at 17:47

## 2017-10-11 RX ADMIN — VANCOMYCIN HYDROCHLORIDE 750 MG: 750 INJECTION, SOLUTION INTRAVENOUS at 12:03

## 2017-10-11 RX ADMIN — MINERAL OIL AND WHITE PETROLATUM 1 APPLICATION: 150; 830 OINTMENT OPHTHALMIC at 22:34

## 2017-10-11 RX ADMIN — RUFINAMIDE 1600 MG: 200 TABLET, FILM COATED ORAL at 17:47

## 2017-10-11 RX ADMIN — OFLOXACIN 1 DROP: 3 SOLUTION OPHTHALMIC at 12:04

## 2017-10-11 RX ADMIN — SODIUM CHLORIDE 500 ML: 0.9 INJECTION, SOLUTION INTRAVENOUS at 00:33

## 2017-10-11 RX ADMIN — LEVOCARNITINE 100 MG: 1 SOLUTION ORAL at 22:34

## 2017-10-11 RX ADMIN — OFLOXACIN 1 DROP: 3 SOLUTION OPHTHALMIC at 22:35

## 2017-10-11 RX ADMIN — OFLOXACIN 1 DROP: 3 SOLUTION OPHTHALMIC at 17:48

## 2017-10-11 RX ADMIN — Medication 1 TABLET: at 08:41

## 2017-10-11 RX ADMIN — LACTULOSE 20 G: 20 SOLUTION ORAL at 17:47

## 2017-10-11 RX ADMIN — LACTULOSE 20 G: 20 SOLUTION ORAL at 08:41

## 2017-10-11 RX ADMIN — LEVOCARNITINE 100 MG: 1 SOLUTION ORAL at 08:41

## 2017-10-11 RX ADMIN — ALBUMIN HUMAN 12.5 G: 0.25 SOLUTION INTRAVENOUS at 04:57

## 2017-10-11 RX ADMIN — VALPROIC ACID 250 MG: 250 SOLUTION ORAL at 11:56

## 2017-10-11 RX ADMIN — VANCOMYCIN HYDROCHLORIDE 750 MG: 750 INJECTION, SOLUTION INTRAVENOUS at 00:34

## 2017-10-11 RX ADMIN — LAMOTRIGINE 200 MG: 100 TABLET ORAL at 08:41

## 2017-10-11 RX ADMIN — LAMOTRIGINE 200 MG: 100 TABLET ORAL at 17:48

## 2017-10-11 RX ADMIN — OFLOXACIN 1 DROP: 3 SOLUTION OPHTHALMIC at 08:41

## 2017-10-11 RX ADMIN — MINERAL OIL AND WHITE PETROLATUM 1 APPLICATION: 150; 830 OINTMENT OPHTHALMIC at 08:41

## 2017-10-11 RX ADMIN — MINERAL OIL AND WHITE PETROLATUM 1 APPLICATION: 150; 830 OINTMENT OPHTHALMIC at 17:48

## 2017-10-11 RX ADMIN — LEVOCARNITINE 100 MG: 1 SOLUTION ORAL at 17:48

## 2017-10-11 RX ADMIN — ENOXAPARIN SODIUM 40 MG: 40 INJECTION SUBCUTANEOUS at 08:41

## 2017-10-11 RX ADMIN — VALPROIC ACID 250 MG: 250 SOLUTION ORAL at 18:04

## 2017-10-11 RX ADMIN — SODIUM CHLORIDE 125 ML/HR: 0.9 INJECTION, SOLUTION INTRAVENOUS at 19:57

## 2017-10-11 RX ADMIN — RUFINAMIDE 1600 MG: 200 TABLET, FILM COATED ORAL at 08:41

## 2017-10-11 NOTE — NUTRITION
10/11/17 5206   Recommendations/Interventions   Interventions Diet: continued as ordered;EN initiate  (Discussed TF recommendation with MD; Decided to order continuous at this time due to pt's medical condition and return to bolus feedings when more stable back at facility)   Nutrition Recommendations Tube Feeding Recommendation provided;Continue diet as ordered  (Suggest TF plus oral diet as ST rec Level 3 Dysphagia thin liquids and start Jevity 1 2 at 20 ml/hr gradually increase by 10ml/hr Q4hrs to goal 60ml/hr = 1440 kcal with 67gm pro  Add 115ml free h20 flushes Q 6hrs Monitor tolerance,labs and wt )   Note suggest decrease free H20 flushes to 115ml free h20 flushes Q6 hrs  at this time

## 2017-10-11 NOTE — PLAN OF CARE
DISCHARGE PLANNING     Discharge to home or other facility with appropriate resources Progressing        INFECTION - ADULT     Absence or prevention of progression during hospitalization Progressing     Absence of fever/infection during neutropenic period Progressing        Knowledge Deficit     Patient/family/caregiver demonstrates understanding of disease process, treatment plan, medications, and discharge instructions Progressing        METABOLIC, FLUID AND ELECTROLYTES - ADULT     Electrolytes maintained within normal limits Progressing     Fluid balance maintained Progressing        NEUROSENSORY - ADULT     Achieves stable or improved neurological status Progressing     Absence of seizures Progressing     Remains free of injury related to seizures activity Progressing     Achieves maximal functionality and self care Progressing        Nutrition/Hydration-ADULT     Nutrient/Hydration intake appropriate for improving, restoring or maintaining nutritional needs Progressing        Potential for Falls     Patient will remain free of falls Progressing        Prexisting or High Potential for Compromised Skin Integrity     Skin integrity is maintained or improved Progressing        RESPIRATORY - ADULT     Achieves optimal ventilation and oxygenation Progressing        SAFETY ADULT     Maintain or return to baseline ADL function Progressing     Maintain or return mobility status to optimal level Progressing

## 2017-10-11 NOTE — SOCIAL WORK
CM was informed that pt was admitted from [facility]  CM called Louie Felipe in admissions who states pt is a 15 day MA bedhold at [facility] informed CM that pt receives total care; pt requires a chelita lift for transfer---pt is bed confined  Pt requires 1:1 staffing 24/7--[facility] is requesting 24 hour notice prior to dc to have staff in place  Pt uses [facility] transport for when dc'd  [facility] referral sent to [facility]  Attempted to contact pts mother Sathya Sada [phone] regarding pt dc plan--unable to leave a message

## 2017-10-11 NOTE — MALNUTRITION/BMI
This medical record reflects one or more clinical indicators suggestive of malnutrition     Please indicate the one diagnosis below which you feel best reflects the clinical picture  BMI Findings:  <19 Underweight    Body mass index is 17 56 kg/m²  See Nutrition note dated 10/11/2017  for additional details  Completed nutrition assessment is viewable in the nutrition documentation

## 2017-10-11 NOTE — CONSULTS
Consultation - Neurology   Richie Tabor 40 y o  female MRN: [id number]  Unit/Bed#: [facility] 714-01 Encounter: [id number]      Assessment/Plan   36-year-old female with a history of 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with tonic seizures, epilepsy indeterminate as to focal or generalized intractable, remote history of toxic brain injury and severe intellectual disability  She is on multiple antiepileptic medications and also has a VNS implantation  She presented to [facility] with increased frequency of seizures in the setting of fever which was found to be sepsis secondary to pneumonia  Patient was transferred to [facility] further care  Currently she is on antibiotics  There is reports that she is having increased frequency of her seizures, by nursing home staff, there is also reports by staff today the patient having seizures described as eyes rolling back head, limbs tremoring, and flushed face which last only a few seconds  It appears that she is not on her current home dosing of antiepileptics, in addition in the setting of toxic metabolic derangement this would be an explanation for her increased in baseline seizure frequency  -  I reviewed the case with with epilepsy service  -  Will place on home dosing of medications and correct dosing of medications  -  VNS check  -  Video EEG monitoring to document frequency  -  Continue treat underlying infection and monitor for metabolic derangements  -  Neurological checks    History of Present Illness     Reason for Consult / Principal Problem: Seizure    HPI: Richie Tabor is a 40 y o   female known to our service follows with Dr Vanessa Costa with the epilepsy service as an out patient  The patient has a history of 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with tonic seizures, epilepsy undetermined as to focal or generalized intractable, hyper ammonia, she has a remote history of anoxic brain injury, severe intellectual disability  She has a VNS implantation   She is was on Banzel  1600 mg every 12 hours, Onfi 20 mg every 12 hours, valporic acid 250 mg twice a day, lamictal 300 mg twice a day, topamax 250 mg twice a day,  levocarnitine 330 mg every 8 hours, per last visit  Last visit there was a concern for breakthrough seizures, her VNS was increased, lamictal and topamax was increased - please see full note in all scripts by Dr Claudio Rey on 6-5-17 for full details of encounter (this was reviewed in detail with Dr J Luis Andrade)  The patient presented to [facility] on 10-10-17 from a nursing home for evaluation of fever  Nursing home reports noted that the patients temperature was 101 and given tylenol  The patient in ED was found to have a temperature, low blood pressure and treated for sepsis  The patient was placed on antibiotics  The patient was transferred to [facility]  While at [facility] this a m  there was reported seizure like activity described as raising and tensing of her arms, eyes slightly rolled back in head  The activity lasted a few seconds  I reviewed with NH staff, nurse on the unit  The patient seizures are described as hand tremoring, eyes rolling back of head and head turning, she could not tell me exact frequency other than this has increased recently  I reviewed nursing home list of seizure medications  As below -  Onfi 20 mg twice daily  Topamax 250 mg twice daily  Valporic acid 250 mg every 8 hours  Lamictal 200 mg twice daily  Banzel 1600 mg twice daily    Per nursing assistant in room -     The patient has been having frequent events of eyes rolling in back of head, face becoming red, and upper extremity twitching stereotypical, about every 20 minutes, lasting a few seconds  With record review the patient was admitted for PNA and sepsis in July of 2017 - treated with antibiotics      Inpatient consult to Neurology  Consult performed by: Mirza Hopson ordered by: Genie Acosta        Review of Systems   Unable to perform ROS: Patient nonverbal       Historical Information Past Medical History:   Diagnosis Date    ADHD     Anoxic brain damage ([facility] [address] )     Autistic disorder     Epilepsy (HCC)     Hyperammonemia (HCC)     Hyperkeratosis     Hypotension     Hypotension     Intellectual disability     Intellectual disability     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with tonic seizures (HCC)     Lethargy     Liver enzyme elevation     Onychomycosis     Osteoporosis     Osteoporosis     Psychiatric disorder     anxiety    Seizures (HCC)      Past Surgical History:   Procedure Laterality Date    CARDIAC PACEMAKER PLACEMENT      JEJUNOSTOMY FEEDING TUBE      PEG TUBE PLACEMENT       Social History   History   Alcohol Use No     History   Drug Use No     History   Smoking Status    Never Smoker   Smokeless Tobacco    Never Used     Family History: No family history on file  Review of previous medical records as above      Meds/Allergies   all current active meds have been reviewed, current meds:   Current Facility-Administered Medications   Medication Dose Route Frequency    acetaminophen (TYLENOL) oral suspension 650 mg  650 mg Oral Q4H PRN    artificial tear (LUBRIFRESH P M ) ophthalmic ointment 1 application  1 application Ophthalmic TID    [START ON 10/13/2017] bisacodyl (DULCOLAX) rectal suppository 10 mg  10 mg Rectal PRN    cefepime (MAXIPIME) 2,000 mg in dextrose 5 % 50 mL IVPB  2,000 mg Intravenous Q12H    enoxaparin (LOVENOX) subcutaneous injection 40 mg  40 mg Subcutaneous Daily    lactulose 20 g/30 mL oral solution 20 g  20 g Per PEG Tube BID    lamoTRIgine (LaMICtal) tablet 200 mg  200 mg Per G Tube BID    levOCARNitine (CARNITOR) oral solution 100 mg  100 mg Oral TID    magnesium hydroxide (MILK OF MAGNESIA) 400 mg/5 mL oral suspension 30 mL  30 mL Per G Tube PRN    multivitamin-minerals (CENTRUM) tablet 1 tablet  1 tablet Per G Tube Daily    ofloxacin (OCUFLOX) 0 3 % ophthalmic solution 1 drop  1 drop Both Eyes 4x Daily    ondansetron (ZOFRAN) injection 4 mg  4 mg Intravenous Q6H PRN    ondansetron (ZOFRAN-ODT) dispersible tablet 4 mg  4 mg Oral Q8H PRN    rufinamide (BANZEL) tablet 1,600 mg  1,600 mg Per G Tube BID    sodium chloride 0 9 % infusion  125 mL/hr Intravenous Continuous    topiramate (TOPAMAX) tablet 250 mg  250 mg Per G Tube BID    Valproic Acid (DEPAKENE) 250 MG/5ML oral soln 250 mg  250 mg Per G Tube Q8H    vancomycin (VANCOCIN) IVPB (premix) 750 mg  15 mg/kg Intravenous Q12H    and PTA meds:   Prior to Admission Medications   Prescriptions Last Dose Informant Patient Reported? Taking?    LAMOTRIGINE PO   Yes No   Si mg by Per G Tube route 2 (two) times a day     Multiple Vitamins-Minerals (CENTRUM SILVER PO)   Yes No   Si tablet by Per G Tube route daily   Probiotic Product (ALEXANDER-BID PROBIOTIC PO)   Yes No   Sig: Take by mouth   Rufinamide (BANZEL PO)   Yes No   Si,600 mg by Per G Tube route 2 (two) times a day     Sennosides-Docusate Sodium (SENEXON-S PO)   Yes No   Si tablet by Per G Tube route daily     Valproate Sodium (VALPROIC ACID) 250 MG/5ML SOLN   Yes No   Si mL by Per G Tube route every 8 (eight) hours     White Petrolatum-Mineral Oil (SYSTANE NIGHTTIME) OINT   Yes No   Sig: Apply 1 application to eye 3 (three) times a day Both eyes    acetaminophen (TYLENOL) 325 mg tablet   Yes No   Si mg by Per G Tube route every 6 (six) hours as needed for mild pain or fever     bisacodyl (BISAC-EVAC) 10 mg suppository   Yes No   Sig: Insert 10 mg into the rectum as needed for constipation Indications: if no BM on day 4    cloBAZam (ONFI) tablet   Yes No   Si mg by Per G Tube route 2 (two) times a day   lactulose (CEPHULAC) 10 G packet   Yes No   Si mL by Per G Tube route 2 (two) times a day     levOCARNitine (CARNITOR) 1 g/10 mL solution   Yes No   Sig: Take 100 mg by mouth 3 (three) times a day     magnesium hydroxide (MILK OF MAGNESIA) 400 mg/5 mL oral suspension   Yes No   Si mL by Per G Tube route as needed for constipation     ofloxacin (OCUFLOX) 0 3 % ophthalmic solution   Yes No   Si drop 4 (four) times a day   ondansetron (ZOFRAN) 4 mg tablet   Yes No   Sig: Take 4 mg by mouth every 8 (eight) hours as needed for nausea or vomiting   ondansetron (ZOFRAN-ODT) 4 mg disintegrating tablet   No No   Sig: Take 1 tablet by mouth every 8 (eight) hours as needed for nausea or vomiting   sodium phosphate (FLEET) enema   Yes No   Sig: Insert 1 enema into the rectum as needed Indications: if no BM on day 5  follow package directions   topiramate (TOPAMAX) 200 MG tablet   Yes No   Si mg by Per G Tube route 2 (two) times a day        Facility-Administered Medications: None     Allergies   Allergen Reactions    Felbamate      Objective   Vitals:Blood pressure (!) 87/54, pulse 80, temperature 98 7 °F (37 1 °C), temperature source Rectal, resp  rate 17, height 5' 4" (1 626 m), weight 46 4 kg (102 lb 4 7 oz), SpO2 95 %  ,Body mass index is 17 56 kg/m²  Intake/Output Summary (Last 24 hours) at 10/11/17 1015  Last data filed at 10/11/17 0937   Gross per 24 hour   Intake             3500 ml   Output             2002 ml   Net             1498 ml     Invasive Devices: Invasive Devices     Peripheral Intravenous Line            Peripheral IV 10/10/17 Left Arm 1 day    Peripheral IV 10/10/17 Right Antecubital 1 day          Drain            Gastrostomy/Enterostomy Gastrostomy 18 Fr  LLQ 85 days          Nasogastric/Orogastric Tube            Feeding Tube 251 days              Physical Exam   Constitutional: No distress  Lying on side not tracking to examiner, non verbal   HENT:   Head: Atraumatic  Eyes: Pupils are equal, round, and reactive to light  dysconjugate   Cardiovascular: Normal rate  Pulmonary/Chest: No respiratory distress  She has no wheezes  Vitals reviewed      Neurologic Exam     Mental Status   She is alert, non verbal not tracking, unable to assess MS further     Cranial Nerves     CN III, IV, VI   Pupils are equal, round, and reactive to light  Dysconjugate gaze, pupils difficult to assess appear equal, no facial droop, other cn - difficult to assess  Motor Exam Unable to formally assess strength    withdrawals limbs in upper and lowers  Tone is flaccid  Sensory Exam   With drawls uppers and lowers     Gait, Coordination, and Reflexes     Reflexes   Right plantar: equivocal  Left plantar: upgoing    Lab Results:    Results for orders placed or performed during the hospital encounter of 50/93/37   Basic metabolic panel   Result Value Ref Range    Sodium 143 136 - 145 mmol/L    Potassium 3 6 3 5 - 5 3 mmol/L    Chloride 113 (H) 100 - 108 mmol/L    CO2 23 21 - 32 mmol/L    Anion Gap 7 4 - 13 mmol/L    BUN 4 (L) 5 - 25 mg/dL    Creatinine 0 31 (L) 0 60 - 1 30 mg/dL    Glucose 83 65 - 140 mg/dL    Calcium 8 0 (L) 8 3 - 10 1 mg/dL    eGFR 146 ml/min/1 73sq m   CBC (With Platelets)   Result Value Ref Range    WBC 9 98 4 31 - 10 16 Thousand/uL    RBC 3 21 (L) 3 81 - 5 12 Million/uL    Hemoglobin 10 7 (L) 11 5 - 15 4 g/dL    Hematocrit 32 5 (L) 34 8 - 46 1 %     (H) 82 - 98 fL    MCH 33 3 26 8 - 34 3 pg    MCHC 32 9 31 4 - 37 4 g/dL    RDW 14 0 11 6 - 15 1 %    Platelets 046 (L) 432 - 390 Thousands/uL    MPV 11 8 8 9 - 12 7 fL     Imaging Studies:     Per radiology interpretation -  "  Xr Chest Portable - 1 View    Result Date: 10/10/2017  Narrative: CHEST  PORTABLE INDICATION: Fever, cough COMPARISON:  February 22, 2017 VIEWS:   AP semierect; IMAGES:  1 FINDINGS: Neurostimulator overlies the left anterior chest wall with electrode lead extending into left side of the neck  The cardiomediastinal silhouette is unremarkable  The lungs are clear  No pleural effusion  No pneumothorax  Bony thorax unremarkable  Impression: No active disease        Workstation performed: [id number]     Ct Chest Wo Contrast    Result Date: 10/10/2017  Narrative: CT CHEST WITHOUT IV CONTRAST INDICATION: Pneumonia COMPARISON: CT performed on 7/6/2017 TECHNIQUE: CT examination of the chest was performed without intravenous contrast   Reformatted images were created in axial, sagittal, and coronal planes  Radiation dose length product (DLP) for this visit:  182 38 mGy-cm   This examination, like all CT scans performed in the [facility], was performed utilizing techniques to minimize radiation dose exposure, including the use of iterative  reconstruction and automated exposure control  FINDINGS: LUNGS:  Changes of groundglass and mild areas of consolidative change in the left lower lobe reflective of early bronchopneumonia  No additional consolidative changes identified PLEURA:  Trace left pleural effusion  HEART/GREAT VESSELS:  Heart is normal in size  MEDIASTINUM AND CAROLINA:  Unremarkable  CHEST WALL AND LOWER NECK:       Normal  VISUALIZED STRUCTURES IN THE UPPER ABDOMEN:  Unremarkable  OSSEOUS STRUCTURES:  No acute fracture  No destructive osseous lesion  Impression: Left lower lobe developing bronchopneumonia  Trace left pleural effusion      Workstation performed: [id number]   "  Code Status: Level 1 - Full Code

## 2017-10-11 NOTE — PROGRESS NOTES
Pt had low temp this AM  Pt was incontinent and only had a sheet on at the time  Pt changed by PCA and blankets added on  Will recheck tempt  Pt having seizure like activity with raising and tensing of arms, eyes slightly rolled back of head  Activity lasts a few seconds and then patient relaxes from tensing and looks around  SLIM notified and aware of all of the above  Dr Lauren Paniagua was in to see pt and saw seizure like activity  Neuro consulted  Will continue to monitor

## 2017-10-11 NOTE — CASE MANAGEMENT
Initial Clinical Review    Admission: Date/Time/Statement: 10/10/17 @ 0503     Orders Placed This Encounter   Procedures    Inpatient Admission (expected length of stay for this patient is greater than two midnights)     Standing Status:   Standing     Number of Occurrences:   1     Order Specific Question:   Admitting Physician     Answer:   Kelli Broussard     Order Specific Question:   Level of Care     Answer:   Med Surg [16]     Order Specific Question:   Estimated length of stay     Answer:   More than 2 Midnights     Order Specific Question:   Certification     Answer:   I certify that inpatient services are medically necessary for this patient for a duration of greater than two midnights  See H&P and MD Progress Notes for additional information about the patient's course of treatment  ED: Date/Time/Mode of Arrival:   ED Arrival Information     Expected Arrival Acuity Means of Arrival Escorted By Service Admission Type    - 10/10/2017 04:09 Emergent Ambulance [facility] General Medicine Emergency    Arrival Complaint    Fever      Chief Complaint:   Chief Complaint   Patient presents with    Fever - 9 weeks to 74 years     Patient had 2 seizure tonight and was running a fever as well  Nursing home reported hypotension and that patient was lethargic  History of Illness:   30-year-old female presents from nursing home for evaluation of fever  Patient has recurrent sepsis last source was pneumonia  Nursing home states that this evening at around midnight the patient was found have a temperature of 101° and was given 650 mg of Tylenol  Patient is nonverbal at baseline and exhibits no signs or symptoms of severe distress upon my evaluation    Rectal temperature at present is 100 7  ED Vital Signs:   ED Triage Vitals [10/10/17 0418]   Temperature Pulse Respirations Blood Pressure SpO2   (!) 100 7 °F (38 2 °C) 104 22 112/67 96 %      Temp Source Heart Rate Source Patient Position - Orthostatic VS BP Location FiO2 (%)   Rectal Monitor Lying Left arm --      Pain Score       No Pain        Wt Readings from Last 1 Encounters:   10/10/17 46 4 kg (102 lb 4 7 oz)   Vital Signs (abnormal):   BP 86/61  Abnormal Labs/Diagnostic Test Results:   WBC 25 23 Co2 20 ANION GAP 15 GLUC 141 LACTIC ACID 2 7   VALPROIC ACID 124  U/A+KETONES  CXR=No active disease  CT CHEST=Left lower lobe developing bronchopneumonia  Trace left pleural effusion  ED Treatment:   Medication Administration from 10/10/2017 0409 to 10/11/2017 9786       Date/Time Order Dose Route Action Action by Comments     10/10/2017 0522 sodium chloride 0 9 % bolus 1,000 mL 0 mL Intravenous Stopped Alonzo Torrez RN      10/10/2017 0430 sodium chloride 0 9 % bolus 1,000 mL 1,000 mL Intravenous [address] Alonzo Torrez RN      10/10/2017 8129 sodium chloride 0 9 % bolus 1,000 mL 0 mL Intravenous Stopped Alonzo Torrez RN      10/10/2017 0524 sodium chloride 0 9 % bolus 1,000 mL 1,000 mL Intravenous [address] Alonzo Torrez RN      10/10/2017 0501 cefepime (MAXIPIME) IVPB (premix) 2,000 mg 0 mg Intravenous Stopped Alonzo Torrez RN      10/10/2017 0431 cefepime (MAXIPIME) IVPB (premix) 2,000 mg 2,000 mg Intravenous [address] Alonzo Torrez RN      10/10/2017 0708 vancomycin (VANCOCIN) 750 mg in sodium chloride 0 9 % 250 mL IVPB 0 mg/kg Intravenous Stopped Shell Monteiro RN      10/10/2017 4543 vancomycin (VANCOCIN) 750 mg in sodium chloride 0 9 % 250 mL IVPB 750 mg Intravenous New Bag Alonzo Torrez RN       Past Medical/Surgical History:    Active Ambulatory Problems     Diagnosis Date Noted    Sepsis ([facility] ) 02/22/2017    Seizure ([facility] ) 02/22/2017    Dysphagia 02/22/2017    HCAP (healthcare-associated pneumonia) 02/23/2017    Seizure disorder ([facility] ) 07/06/2017    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[facility] ) 07/06/2017    Lactic acidosis 07/06/2017    Positive blood culture 07/07/2017    Gastrostomy tube obstruction ([facility] ) 07/14/2017 Resolved Ambulatory Problems     Diagnosis Date Noted    Pneumonia 02/22/2017    Acute encephalopathy 02/23/2017     Past Medical History:   Diagnosis Date    ADHD     Anoxic brain damage (HCC)     Autistic disorder     Epilepsy (HCC)     Hyperammonemia (HCC)     Hyperkeratosis     Hypotension     Hypotension     Intellectual disability     Intellectual disability     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with tonic seizures (HCC)     Lethargy     Liver enzyme elevation     Onychomycosis     Osteoporosis     Osteoporosis     Psychiatric disorder     Seizures (HCC)    Admitting Diagnosis: Fever [R50 9]    Age/Sex: 40 y o  female    Assessment/Plan:   Assessment:  Hypotension  Sepsis/fever  seizure     Plan:  1  Sepsis-- with low blood pressure, unknown source                      --- continue with  vancomycin and cefepime                          --IV fluids                         --ICU placement                         -- critical care consult                           ---blood and urine cultures  2 seizures--  continue with Lamictal, Ativan p r n  3 lennox gastaut syndrome -- c/w   Current therapy  4  Disposition-- full code  5  DVT prophylaxis-- subcu heparin    TRANSFERRED TO [facility] FROM ER  Condition Necessitating Transfer The primary encounter diagnosis was Sepsis ([facility] [address] )  Diagnoses of Lactic acidosis, 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[facility] [address] ), and Seizure ([facility] [address] ) were also pertinent to this visit  Admission Orders:  Scheduled Meds:   Continuous Infusions:   No current facility-administered medications for this encounter     PRN Meds:

## 2017-10-11 NOTE — CASE MANAGEMENT
Initial Clinical Review    Admission: Date/Time/Statement: 10/10/17 @ 1011     Orders Placed This Encounter   Procedures    Inpatient Admission     Standing Status:   Standing     Number of Occurrences:   1     Order Specific Question:   Admitting Physician     Answer:   Swenson Breath     Order Specific Question:   Level of Care     Answer:   Level 2 Stepdown / HOT [14]     Order Specific Question:   Estimated length of stay     Answer:   More than 2 Midnights     Order Specific Question:   Certification     Answer:   I certify that inpatient services are medically necessary for this patient for a duration of greater than two midnights  See H&P and MD Progress Notes for additional information about the patient's course of treatment  ED: Date/Time/Mode of Arrival:   ED Arrival Information     Patient not seen in ED                       Chief Complaint: No chief complaint on file  History of Illness: Zbigniew Fan is a 40 y o  female who presents with history of seizure, intellectual disability, dysphagia s/p PEG was transferred from [facility] for fever and SOB  Patient found to have sepsis secondary to possible pneumonia and transferred for further management    Patient nonverbal and history taken from ER notes        ED Vital Signs:   ED Triage Vitals   Temperature Pulse Respirations Blood Pressure SpO2   10/10/17 1028 10/10/17 1028 10/10/17 1028 10/10/17 1028 10/10/17 1028   97 5 °F (36 4 °C) 76 18 93/67 95 %      Temp Source Heart Rate Source Patient Position - Orthostatic VS BP Location FiO2 (%)   10/10/17 1028 10/10/17 1153 10/10/17 1028 10/10/17 1028 --   Rectal Monitor Lying Left arm       Pain Score       10/10/17 1017       No Pain        Wt Readings from Last 1 Encounters:   10/10/17 46 4 kg (102 lb 4 7 oz)       Vital Signs (abnormal):   10/11/17 0934 98 7 °F (37 1 °C) -- -- -- -- -- AV   10/11/17 0716  96 5 °F (35 8 °C) 80 17  87/54 95 % -- SS   10/11/17 0402 -- -- --  80/56 -- -- MLF 10/11/17 0352  96 8 °F (36 °C) 80 18  76/56 99 % -- MS   10/11/17 0216 -- -- --  84/58 -- -- MLF   10/11/17 0023 98 °F (36 7 °C) 88 18  84/54 -- -- MLF   10/10/17 2353  96 7 °F (35 9 °C) 81 18  83/50 99 % -- MS   10/10/17 2041 -- -- 18 -- -- -- MS   10/10/17 2041 98 6 °F (37 °C) 97 --  82/56 -- -- MLF   10/10/17 1911  96 1 °F (35 6 °C) 82 18  84/58 98 % -- DC   10/10/17 1505 98 1 °F (36 7 °C) 81 18  86/61 95 %       Abnormal Labs/Diagnostic Test Results: CT chest -   Left lower lobe developing bronchopneumonia  Trace left pleural effusion  ED Treatment:   Medication Administration - No Administrations Displayed (No Start Event Found)     None        Past Medical/Surgical History:    Active Ambulatory Problems     Diagnosis Date Noted    Sepsis ([facility] ) 02/22/2017    Seizure ([facility] ) 02/22/2017    Dysphagia 02/22/2017    HCAP (healthcare-associated pneumonia) 02/23/2017    Seizure disorder ([facility] ) 07/06/2017    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[facility] ) 07/06/2017    Lactic acidosis 07/06/2017    Positive blood culture 07/07/2017    Gastrostomy tube obstruction ([facility] ) 07/14/2017     Resolved Ambulatory Problems     Diagnosis Date Noted    Pneumonia 02/22/2017    Acute encephalopathy 02/23/2017     Past Medical History:   Diagnosis Date    ADHD     Anoxic brain damage (HCC)     Autistic disorder     Epilepsy ([facility] )     Hyperammonemia (HCC)     Hyperkeratosis     Hypotension     Hypotension     Intellectual disability     Intellectual disability     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with tonic seizures (HCC)     Lethargy     Liver enzyme elevation     Onychomycosis     Osteoporosis     Osteoporosis     Psychiatric disorder     Seizures (HCC)        Admitting Diagnosis: Sepsis ([facility] ) [A41 9]    Age/Sex: 40 y o  female    Assessment/Plan:      Hospital Problem List:    Principal Problem:    HCAP (healthcare-associated pneumonia)  Active Problems:    Sepsis ([facility] )    Seizure ([facility] )    Dysphagia    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[address] for the Primary Problem(s):  · 1  Sepsis- mostlikley secondary to HCAP- will place on vancomycin and cefepime  Will f/u Ct chest  · 2  H/o of seizure disorder- on valproic acid, topamax and lamictal  · 3  Dysphagia- has PEG  · 4   Lennox gastuat syndrome- on rufinamide  · 5  Hypotension- on IVF  BP improved  Plan for Additional Problems:    VTE Prophylaxis: Enoxaparin (Lovenox)  / sequential compression device   Code Status: Full  POLST: There is no POLST form on file for this patient (pre-hospital)   Anticipated Length of Stay:  Patient will be admitted on an Inpatient basis with an anticipated length of stay of  more 2 midnights     Justification for Hospital Stay: pneumonia    Admission Orders:  Scheduled Meds:   artificial tear 1 application Ophthalmic TID   cefepime 2,000 mg Intravenous Q12H   enoxaparin 40 mg Subcutaneous Daily   lactulose 20 g Per PEG Tube BID   lamoTRIgine 200 mg Per G Tube BID   levOCARNitine 100 mg Oral TID   multivitamin-minerals 1 tablet Per G Tube Daily   ofloxacin 1 drop Both Eyes 4x Daily   rufinamide 1,600 mg Per G Tube BID   topiramate 250 mg Per G Tube BID   Valproic Acid 250 mg Per G Tube Q8H   vancomycin 15 mg/kg Intravenous Q12H     Continuous Infusions:   sodium chloride 125 mL/hr Last Rate: 125 mL/hr (10/10/17 4473)     PRN Meds:   acetaminophen    [START ON 10/13/2017] bisacodyl    magnesium hydroxide    ondansetron    ondansetron    Strict bedrest   Cardiac diet   OBS 1:1   Bmp, cbc x3 days  Speech eval   Neuro consult SCD  Tele   10/11 bmp, cbc    Cl   113, BUN creat   4  0 31, shaneka  8 0, H&H   10 7  32 5  , plt   129

## 2017-10-12 ENCOUNTER — APPOINTMENT (INPATIENT)
Dept: NEUROLOGY | Facility: AMBULATORY SURGERY CENTER | Age: 37
DRG: 871 | End: 2017-10-12
Payer: MEDICARE

## 2017-10-12 LAB
ANION GAP SERPL CALCULATED.3IONS-SCNC: 9 MMOL/L (ref 4–13)
BASOPHILS # BLD AUTO: 0.02 THOUSANDS/ΜL (ref 0–0.1)
BASOPHILS NFR BLD AUTO: 0 % (ref 0–1)
BUN SERPL-MCNC: 5 MG/DL (ref 5–25)
CALCIUM SERPL-MCNC: 9 MG/DL (ref 8.3–10.1)
CHLORIDE SERPL-SCNC: 113 MMOL/L (ref 100–108)
CO2 SERPL-SCNC: 22 MMOL/L (ref 21–32)
CREAT SERPL-MCNC: 0.35 MG/DL (ref 0.6–1.3)
EOSINOPHIL # BLD AUTO: 0.24 THOUSAND/ΜL (ref 0–0.61)
EOSINOPHIL NFR BLD AUTO: 3 % (ref 0–6)
ERYTHROCYTE [DISTWIDTH] IN BLOOD BY AUTOMATED COUNT: 13.8 % (ref 11.6–15.1)
GFR SERPL CREATININE-BSD FRML MDRD: 140 ML/MIN/1.73SQ M
GLUCOSE SERPL-MCNC: 108 MG/DL (ref 65–140)
HCT VFR BLD AUTO: 35.9 % (ref 34.8–46.1)
HGB BLD-MCNC: 12 G/DL (ref 11.5–15.4)
LYMPHOCYTES # BLD AUTO: 2.9 THOUSANDS/ΜL (ref 0.6–4.47)
LYMPHOCYTES NFR BLD AUTO: 34 % (ref 14–44)
MCH RBC QN AUTO: 33.1 PG (ref 26.8–34.3)
MCHC RBC AUTO-ENTMCNC: 33.4 G/DL (ref 31.4–37.4)
MCV RBC AUTO: 99 FL (ref 82–98)
MONOCYTES # BLD AUTO: 0.81 THOUSAND/ΜL (ref 0.17–1.22)
MONOCYTES NFR BLD AUTO: 10 % (ref 4–12)
NEUTROPHILS # BLD AUTO: 4.5 THOUSANDS/ΜL (ref 1.85–7.62)
NEUTS SEG NFR BLD AUTO: 53 % (ref 43–75)
NRBC BLD AUTO-RTO: 0 /100 WBCS
PLATELET # BLD AUTO: 155 THOUSANDS/UL (ref 149–390)
PMV BLD AUTO: 11.8 FL (ref 8.9–12.7)
POTASSIUM SERPL-SCNC: 3.5 MMOL/L (ref 3.5–5.3)
RBC # BLD AUTO: 3.62 MILLION/UL (ref 3.81–5.12)
SODIUM SERPL-SCNC: 144 MMOL/L (ref 136–145)
TOPIRAMATE SERPL-MCNC: 4.7 UG/ML (ref 2–25)
WBC # BLD AUTO: 8.49 THOUSAND/UL (ref 4.31–10.16)

## 2017-10-12 PROCEDURE — 95951 HB EEG MONITORING/VIDEORECORD: CPT

## 2017-10-12 PROCEDURE — 85025 COMPLETE CBC W/AUTO DIFF WBC: CPT | Performed by: INTERNAL MEDICINE

## 2017-10-12 PROCEDURE — 80048 BASIC METABOLIC PNL TOTAL CA: CPT | Performed by: INTERNAL MEDICINE

## 2017-10-12 RX ORDER — CLOBAZAM 10 MG/1
10 TABLET ORAL 2 TIMES DAILY
Status: DISCONTINUED | OUTPATIENT
Start: 2017-10-12 | End: 2017-10-16

## 2017-10-12 RX ADMIN — VALPROIC ACID 250 MG: 250 SOLUTION ORAL at 03:16

## 2017-10-12 RX ADMIN — LACTULOSE 20 G: 20 SOLUTION ORAL at 18:00

## 2017-10-12 RX ADMIN — SODIUM CHLORIDE 125 ML/HR: 0.9 INJECTION, SOLUTION INTRAVENOUS at 05:55

## 2017-10-12 RX ADMIN — CLOBAZAM 20 MG: 10 TABLET ORAL at 09:18

## 2017-10-12 RX ADMIN — LACTULOSE 20 G: 20 SOLUTION ORAL at 09:17

## 2017-10-12 RX ADMIN — RUFINAMIDE 1600 MG: 200 TABLET, FILM COATED ORAL at 18:00

## 2017-10-12 RX ADMIN — PERAMPANEL 2 MG: 4 TABLET ORAL at 16:32

## 2017-10-12 RX ADMIN — RUFINAMIDE 1600 MG: 200 TABLET, FILM COATED ORAL at 09:18

## 2017-10-12 RX ADMIN — MINERAL OIL AND WHITE PETROLATUM 1 APPLICATION: 150; 830 OINTMENT OPHTHALMIC at 16:33

## 2017-10-12 RX ADMIN — LEVOCARNITINE 100 MG: 1 SOLUTION ORAL at 09:18

## 2017-10-12 RX ADMIN — Medication 1 TABLET: at 09:18

## 2017-10-12 RX ADMIN — OFLOXACIN 1 DROP: 3 SOLUTION OPHTHALMIC at 09:17

## 2017-10-12 RX ADMIN — VALPROIC ACID 250 MG: 250 SOLUTION ORAL at 18:00

## 2017-10-12 RX ADMIN — ENOXAPARIN SODIUM 40 MG: 40 INJECTION SUBCUTANEOUS at 09:17

## 2017-10-12 RX ADMIN — TOPIRAMATE 250 MG: 100 TABLET, FILM COATED ORAL at 09:17

## 2017-10-12 RX ADMIN — CEFEPIME HYDROCHLORIDE 2000 MG: 2 INJECTION, POWDER, FOR SOLUTION INTRAVENOUS at 00:49

## 2017-10-12 RX ADMIN — LEVOCARNITINE 100 MG: 1 SOLUTION ORAL at 16:32

## 2017-10-12 RX ADMIN — OFLOXACIN 1 DROP: 3 SOLUTION OPHTHALMIC at 22:14

## 2017-10-12 RX ADMIN — VANCOMYCIN HYDROCHLORIDE 750 MG: 750 INJECTION, SOLUTION INTRAVENOUS at 03:16

## 2017-10-12 RX ADMIN — VALPROIC ACID 250 MG: 250 SOLUTION ORAL at 11:50

## 2017-10-12 RX ADMIN — VANCOMYCIN HYDROCHLORIDE 750 MG: 750 INJECTION, SOLUTION INTRAVENOUS at 14:28

## 2017-10-12 RX ADMIN — LEVOCARNITINE 100 MG: 1 SOLUTION ORAL at 22:15

## 2017-10-12 RX ADMIN — OFLOXACIN 1 DROP: 3 SOLUTION OPHTHALMIC at 11:55

## 2017-10-12 RX ADMIN — CLOBAZAM 10 MG: 10 TABLET ORAL at 17:59

## 2017-10-12 RX ADMIN — TOPIRAMATE 250 MG: 100 TABLET, FILM COATED ORAL at 17:59

## 2017-10-12 RX ADMIN — LAMOTRIGINE 200 MG: 100 TABLET ORAL at 09:17

## 2017-10-12 RX ADMIN — MINERAL OIL AND WHITE PETROLATUM 1 APPLICATION: 150; 830 OINTMENT OPHTHALMIC at 22:14

## 2017-10-12 RX ADMIN — CEFEPIME HYDROCHLORIDE 2000 MG: 2 INJECTION, POWDER, FOR SOLUTION INTRAVENOUS at 11:51

## 2017-10-12 RX ADMIN — LAMOTRIGINE 200 MG: 100 TABLET ORAL at 18:00

## 2017-10-12 NOTE — PLAN OF CARE
Problem: DISCHARGE PLANNING - CARE MANAGEMENT  Goal: Discharge to post-acute care or home with appropriate resources  INTERVENTIONS:  - Conduct assessment to determine patient/family and health care team treatment goals, and need for post-acute services based on payer coverage, community resources, and patient preferences, and barriers to discharge  - Address psychosocial, clinical, and financial barriers to discharge as identified in assessment in conjunction with the patient/family and health care team  - Arrange appropriate level of post-acute services according to patient's   needs and preference and payer coverage in collaboration with the physician and health care team  - Communicate with and update the patient/family, physician, and health care team regarding progress on the discharge plan  - Arrange appropriate transportation to post-acute venues  - Plan for discharge to [facility]    Outcome: Progressing

## 2017-10-12 NOTE — SPEECH THERAPY NOTE
Speech Language/Pathology  Pt taking portions of meals for staff when she wants to eat  No overt difficulty noted & her tube feed is still running  No further f/u at this time  Re consult if there is any need in the future

## 2017-10-12 NOTE — PROGRESS NOTES
[facility] Internal Medicine Progress Note  Patient: Emilia Alfredo 40 y o  female   MRN: [id number]  PCP: Sola Mills DO  Unit/Bed#: [facility] 715-01 Encounter: [id number]  Date Of Visit: 10/12/17    Assessment:    Principal Problem:    HCAP (healthcare-associated pneumonia)  Active Problems:    Sepsis ([facility] )    Seizure ([facility] )    Dysphagia    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[facility] )      Plan:    · 1  Sepsis-possible gram negative sepsis secondary to HCAP- on vancomycin and cefepime  · 2  H/o of seizure disorder- patient on vEEG  · 3  Dysphagia- has PEG  · 4   Lennox gastuat syndrome- on rufinamide  Neurology following  · 5  Hypotension- on IVF  · 6  Severe protein calorie malnutrition secondary to chronic illness- on PEG feeds  VTE Pharmacologic Prophylaxis:   Pharmacologic: Enoxaparin (Lovenox)  Mechanical VTE Prophylaxis in Place: Yes    Patient Centered Rounds: I have performed bedside rounds with nursing staff today  Discussions with Specialists or Other Care Team Provider:     Education and Discussions with Family / Patient:     Time Spent for Care: 20 minutes  More than 50% of total time spent on counseling and coordination of care as described above  Current Length of Stay: 2 day(s)    Current Patient Status: Inpatient   Certification Statement: The patient will continue to require additional inpatient hospital stay due to pneumonia, seizure    Discharge Plan / Estimated Discharge Date: once pneumonia resolves    Code Status: Level 1 - Full Code      Subjective:   Patient seen and examined at bedside  Patient nonverbal     Objective:     Vitals:   Temp (24hrs), Av °F (36 7 °C), Min:97 5 °F (36 4 °C), Max:98 6 °F (37 °C)    HR:  [74-92] 92  Resp:  [16-18] 18  BP: ()/(55-71) 105/62  SpO2:  [97 %-100 %] 98 %  Body mass index is 17 56 kg/m²  Input and Output Summary (last 24 hours):        Intake/Output Summary (Last 24 hours) at 10/12/17 1312  Last data filed at 10/12/17 0800   Gross per 24 hour   Intake              320 ml   Output                0 ml   Net              320 ml       Physical Exam:     Physical Exam   Constitutional: She appears well-developed and well-nourished  HENT:   Head: Normocephalic and atraumatic  Eyes: Conjunctivae and EOM are normal  Pupils are equal, round, and reactive to light  Neck: Normal range of motion  Neck supple  No JVD present  No tracheal deviation present  No thyromegaly present  Cardiovascular: Normal rate, regular rhythm and normal heart sounds  Exam reveals no gallop and no friction rub  No murmur heard  Pulmonary/Chest: Effort normal and breath sounds normal  No respiratory distress  She has no wheezes  She has no rales  Abdominal: Soft  Bowel sounds are normal  She exhibits no distension  There is no tenderness  There is no rebound  +PEG   Musculoskeletal:   contracted   Neurological: She is alert  Vitals reviewed  Additional Data:     Labs:      Results from last 7 days  Lab Units 10/12/17  0620   WBC Thousand/uL 8 49   HEMOGLOBIN g/dL 12 0   HEMATOCRIT % 35 9   PLATELETS Thousands/uL 155   NEUTROS PCT % 53   LYMPHS PCT % 34   MONOS PCT % 10   EOS PCT % 3       Results from last 7 days  Lab Units 10/12/17  0620  10/10/17  0423   SODIUM mmol/L 144  < > 142   POTASSIUM mmol/L 3 5  < > 3 7   CHLORIDE mmol/L 113*  < > 107   CO2 mmol/L 22  < > 20*   BUN mg/dL 5  < > 16   CREATININE mg/dL 0 35*  < > 0 60   CALCIUM mg/dL 9 0  < > 8 5   TOTAL PROTEIN g/dL  --   --  6 8   BILIRUBIN TOTAL mg/dL  --   --  0 20   ALK PHOS U/L  --   --  93   ALT U/L  --   --  34   AST U/L  --   --  22   GLUCOSE RANDOM mg/dL 108  < > 141*   < > = values in this interval not displayed  Results from last 7 days  Lab Units 10/10/17  0423   INR  1 05       * I Have Reviewed All Lab Data Listed Above  * Additional Pertinent Lab Tests Reviewed:  All Labs For Current Hospital Admission Reviewed    Imaging:    Imaging Reports Reviewed Today Include:   Imaging Personally Reviewed by Myself Includes:      Recent Cultures (last 7 days):       Results from last 7 days  Lab Units 10/10/17  0423   BLOOD CULTURE  No Growth at 48 hrs  No Growth at 48 hrs  Last 24 Hours Medication List:     artificial tear 1 application Ophthalmic TID   cefepime 2,000 mg Intravenous Q12H   cloBAZam 20 mg Per NG Tube BID   enoxaparin 40 mg Subcutaneous Daily   lactulose 20 g Per PEG Tube BID   lamoTRIgine 200 mg Per G Tube BID   levOCARNitine 100 mg Oral TID   multivitamin-minerals 1 tablet Per G Tube Daily   ofloxacin 1 drop Both Eyes 4x Daily   rufinamide 1,600 mg Per G Tube BID   topiramate 250 mg Per G Tube BID   Valproic Acid 250 mg Per G Tube Q8H   vancomycin 15 mg/kg Intravenous Q12H        Today, Patient Was Seen By: Wilmer Cabrera MD    ** Please Note: This note has been constructed using a voice recognition system   **

## 2017-10-12 NOTE — PHYSICIAN ADVISOR
Current patient class: Inpatient  The patient is currently on Hospital Day: 2      The patient was admitted to the hospital at 1011 on 10/10/17 for the following diagnosis:  Sepsis ([address] ) [A41 9]       There is documentation in the medical record of an expected length of stay of at least 2 midnights  The patient is therefore expected to satisfy the 2 midnight benchmark and given the 2 midnight presumption is appropriate for INPATIENT ADMISSION  Given this expectation of a satisfying stay, CMS instructs us that the patient is most often appropriate for inpatient admission under part A provided medical necessity is documented in the chart  After review of the relevant documentation, labs, vital signs and test results, the patient is appropriate for INPATIENT ADMISSION  Admission to the hospital as an inpatient is a complex decision making process which requires the practitioner to consider the patients presenting complaint, history and physical examination and all relevant testing  With this in mind, in this case, the patient was deemed appropriate for INPATIENT ADMISSION  After review of the documentation and testing available at the time of the admission I concur with this clinical determination of medical necessity  Rationale is as follows: The patient is a 40 yrs old Female who presented to the ED at 10/10/2017 10:11 AM with a chief complaint of No chief complaint on file      The patients vitals on arrival were ED Triage Vitals   Temperature Pulse Respirations Blood Pressure SpO2   10/10/17 1028 10/10/17 1028 10/10/17 1028 10/10/17 1028 10/10/17 1028   97 5 °F (36 4 °C) 76 18 93/67 95 %      Temp Source Heart Rate Source Patient Position - Orthostatic VS BP Location FiO2 (%)   10/10/17 1028 10/10/17 1153 10/10/17 1028 10/10/17 1028 --   Rectal Monitor Lying Left arm       Pain Score       10/10/17 1017       No Pain           Past Medical History:   Diagnosis Date    ADHD     Anoxic brain damage (HCC)     Autistic disorder     Epile[facility] )     Hyperammonemia (HCC)     Hyperkeratosis     Hypotension     Hypotension     Intellectual disability     Intellectual disability     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with tonic seizures (HCC)     Lethargy     Liver enzyme elevation     Onychomycosis     Osteoporosis     Osteoporosis     Psychiatric disorder     anxiety    Seizures (HCC)      Past Surgical History:   Procedure Laterality Date    CARDIAC PACEMAKER PLACEMENT      JEJUNOSTOMY FEEDING TUBE      PEG TUBE PLACEMENT             Consults have been placed to:   IP CONSULT TO NUTRITION SERVICES  IP CONSULT TO NEUROLOGY    Vitals:    10/11/17 1127 10/11/17 1531 10/11/17 1711 10/11/17 1920   BP: 128/65 94/71  101/65   Pulse: 87 88  86   Resp: 18 18  16   Temp: 99 6 °F (37 6 °C) 98 6 °F (37 °C)     TempSrc: Rectal Rectal     SpO2: 95% 100%     Weight:       Height:   5' 2" (1 575 m)        Most recent labs:    Recent Labs      10/10/17   0423  10/11/17   0629   WBC  25 23*  9 98   HGB  13 0  10 7*   HCT  39 7  32 5*   PLT  154  129*   K  3 7  3 6   NA  142  143   CALCIUM  8 5  8 0*   BUN  16  4*   CREATININE  0 60  0 31*   LIPASE  189   --    INR  1 05   --    TROPONINI  <0 02   --    AST  22   --    ALT  34   --    ALKPHOS  93   --    BILITOT  0 20   --        Scheduled Meds:  artificial tear 1 application Ophthalmic TID   cefepime 2,000 mg Intravenous Q12H   cloBAZam 20 mg Per NG Tube BID   enoxaparin 40 mg Subcutaneous Daily   lactulose 20 g Per PEG Tube BID   lamoTRIgine 200 mg Per G Tube BID   levOCARNitine 100 mg Oral TID   multivitamin-minerals 1 tablet Per G Tube Daily   ofloxacin 1 drop Both Eyes 4x Daily   rufinamide 1,600 mg Per G Tube BID   topiramate 250 mg Per G Tube BID   Valproic Acid 250 mg Per G Tube Q8H   vancomycin 15 mg/kg Intravenous Q12H     Continuous Infusions:  sodium chloride 125 mL/hr Last Rate: 125 mL/hr (10/11/17 1957)     PRN Meds:   acetaminophen    [START ON 10/13/2017] bisacodyl    magnesium hydroxide    ondansetron    ondansetron    Surgical procedures (if appropriate):

## 2017-10-13 ENCOUNTER — APPOINTMENT (INPATIENT)
Dept: NEUROLOGY | Facility: AMBULATORY SURGERY CENTER | Age: 37
DRG: 871 | End: 2017-10-13
Payer: MEDICARE

## 2017-10-13 ENCOUNTER — GENERIC CONVERSION - ENCOUNTER (OUTPATIENT)
Dept: OTHER | Facility: OTHER | Age: 37
End: 2017-10-13

## 2017-10-13 LAB
ANION GAP SERPL CALCULATED.3IONS-SCNC: 8 MMOL/L (ref 4–13)
BASOPHILS # BLD AUTO: 0.02 THOUSANDS/ΜL (ref 0–0.1)
BASOPHILS NFR BLD AUTO: 0 % (ref 0–1)
BUN SERPL-MCNC: 9 MG/DL (ref 5–25)
CALCIUM SERPL-MCNC: 8.6 MG/DL (ref 8.3–10.1)
CHLORIDE SERPL-SCNC: 110 MMOL/L (ref 100–108)
CO2 SERPL-SCNC: 24 MMOL/L (ref 21–32)
CREAT SERPL-MCNC: 0.39 MG/DL (ref 0.6–1.3)
EOSINOPHIL # BLD AUTO: 0.36 THOUSAND/ΜL (ref 0–0.61)
EOSINOPHIL NFR BLD AUTO: 4 % (ref 0–6)
ERYTHROCYTE [DISTWIDTH] IN BLOOD BY AUTOMATED COUNT: 14 % (ref 11.6–15.1)
GFR SERPL CREATININE-BSD FRML MDRD: 136 ML/MIN/1.73SQ M
GLUCOSE SERPL-MCNC: 133 MG/DL (ref 65–140)
HCT VFR BLD AUTO: 34.2 % (ref 34.8–46.1)
HGB BLD-MCNC: 11.1 G/DL (ref 11.5–15.4)
LAMOTRIGINE SERPL-MCNC: 4.6 UG/ML (ref 2–20)
LYMPHOCYTES # BLD AUTO: 2.77 THOUSANDS/ΜL (ref 0.6–4.47)
LYMPHOCYTES NFR BLD AUTO: 33 % (ref 14–44)
MCH RBC QN AUTO: 32.5 PG (ref 26.8–34.3)
MCHC RBC AUTO-ENTMCNC: 32.5 G/DL (ref 31.4–37.4)
MCV RBC AUTO: 100 FL (ref 82–98)
MONOCYTES # BLD AUTO: 0.86 THOUSAND/ΜL (ref 0.17–1.22)
MONOCYTES NFR BLD AUTO: 10 % (ref 4–12)
NEUTROPHILS # BLD AUTO: 4.26 THOUSANDS/ΜL (ref 1.85–7.62)
NEUTS SEG NFR BLD AUTO: 53 % (ref 43–75)
NRBC BLD AUTO-RTO: 0 /100 WBCS
PLATELET # BLD AUTO: 154 THOUSANDS/UL (ref 149–390)
PMV BLD AUTO: 12.8 FL (ref 8.9–12.7)
POTASSIUM SERPL-SCNC: 3.6 MMOL/L (ref 3.5–5.3)
RBC # BLD AUTO: 3.42 MILLION/UL (ref 3.81–5.12)
SODIUM SERPL-SCNC: 142 MMOL/L (ref 136–145)
WBC # BLD AUTO: 8.29 THOUSAND/UL (ref 4.31–10.16)

## 2017-10-13 PROCEDURE — 95951 HB EEG MONITORING/VIDEORECORD: CPT

## 2017-10-13 PROCEDURE — 85025 COMPLETE CBC W/AUTO DIFF WBC: CPT | Performed by: INTERNAL MEDICINE

## 2017-10-13 PROCEDURE — 80048 BASIC METABOLIC PNL TOTAL CA: CPT | Performed by: INTERNAL MEDICINE

## 2017-10-13 RX ORDER — LEVETIRACETAM 500 MG/1
1000 TABLET ORAL EVERY 12 HOURS SCHEDULED
Status: DISCONTINUED | OUTPATIENT
Start: 2017-10-13 | End: 2017-10-13

## 2017-10-13 RX ORDER — LEVOCARNITINE 1 G/10ML
500 SOLUTION ORAL 3 TIMES DAILY
Status: DISCONTINUED | OUTPATIENT
Start: 2017-10-14 | End: 2017-10-16

## 2017-10-13 RX ORDER — TOPIRAMATE 100 MG/1
200 TABLET, FILM COATED ORAL ONCE
Status: COMPLETED | OUTPATIENT
Start: 2017-10-13 | End: 2017-10-13

## 2017-10-13 RX ORDER — LEVETIRACETAM 100 MG/ML
1000 SOLUTION ORAL EVERY 12 HOURS SCHEDULED
Status: DISCONTINUED | OUTPATIENT
Start: 2017-10-13 | End: 2017-10-26 | Stop reason: HOSPADM

## 2017-10-13 RX ADMIN — LACTULOSE 20 G: 20 SOLUTION ORAL at 09:05

## 2017-10-13 RX ADMIN — LEVOCARNITINE 100 MG: 1 SOLUTION ORAL at 09:15

## 2017-10-13 RX ADMIN — VALPROIC ACID 250 MG: 250 SOLUTION ORAL at 11:18

## 2017-10-13 RX ADMIN — OFLOXACIN 1 DROP: 3 SOLUTION OPHTHALMIC at 21:17

## 2017-10-13 RX ADMIN — RUFINAMIDE 1600 MG: 200 TABLET, FILM COATED ORAL at 18:08

## 2017-10-13 RX ADMIN — TOPIRAMATE 250 MG: 100 TABLET, FILM COATED ORAL at 09:05

## 2017-10-13 RX ADMIN — MINERAL OIL AND WHITE PETROLATUM 1 APPLICATION: 150; 830 OINTMENT OPHTHALMIC at 21:18

## 2017-10-13 RX ADMIN — LEVETIRACETAM 3000 MG: 100 INJECTION, SOLUTION INTRAVENOUS at 13:55

## 2017-10-13 RX ADMIN — TOPIRAMATE 200 MG: 100 TABLET, FILM COATED ORAL at 11:32

## 2017-10-13 RX ADMIN — PERAMPANEL 4 MG: 4 TABLET ORAL at 21:17

## 2017-10-13 RX ADMIN — CEFEPIME HYDROCHLORIDE 2000 MG: 2 INJECTION, POWDER, FOR SOLUTION INTRAVENOUS at 01:00

## 2017-10-13 RX ADMIN — CEFEPIME HYDROCHLORIDE 2000 MG: 2 INJECTION, POWDER, FOR SOLUTION INTRAVENOUS at 11:18

## 2017-10-13 RX ADMIN — LEVETIRACETAM 1000 MG: 100 SOLUTION ORAL at 21:20

## 2017-10-13 RX ADMIN — ACETAMINOPHEN 650 MG: 160 SUSPENSION ORAL at 03:57

## 2017-10-13 RX ADMIN — VALPROIC ACID 250 MG: 250 SOLUTION ORAL at 18:07

## 2017-10-13 RX ADMIN — RUFINAMIDE 1600 MG: 200 TABLET, FILM COATED ORAL at 09:15

## 2017-10-13 RX ADMIN — ACETAMINOPHEN 650 MG: 160 SUSPENSION ORAL at 11:32

## 2017-10-13 RX ADMIN — VANCOMYCIN HYDROCHLORIDE 750 MG: 750 INJECTION, SOLUTION INTRAVENOUS at 01:56

## 2017-10-13 RX ADMIN — ENOXAPARIN SODIUM 40 MG: 40 INJECTION SUBCUTANEOUS at 09:05

## 2017-10-13 RX ADMIN — VALPROIC ACID 250 MG: 250 SOLUTION ORAL at 02:28

## 2017-10-13 RX ADMIN — Medication 1 TABLET: at 09:05

## 2017-10-13 RX ADMIN — TOPIRAMATE 250 MG: 100 TABLET, FILM COATED ORAL at 18:08

## 2017-10-13 RX ADMIN — CLOBAZAM 10 MG: 10 TABLET ORAL at 18:08

## 2017-10-13 RX ADMIN — CLOBAZAM 10 MG: 10 TABLET ORAL at 09:05

## 2017-10-13 RX ADMIN — LACTULOSE 20 G: 20 SOLUTION ORAL at 18:07

## 2017-10-13 RX ADMIN — MINERAL OIL AND WHITE PETROLATUM 1 APPLICATION: 150; 830 OINTMENT OPHTHALMIC at 18:11

## 2017-10-13 RX ADMIN — VANCOMYCIN HYDROCHLORIDE 750 MG: 750 INJECTION, SOLUTION INTRAVENOUS at 11:44

## 2017-10-13 RX ADMIN — LEVOCARNITINE 100 MG: 1 SOLUTION ORAL at 18:10

## 2017-10-13 RX ADMIN — LAMOTRIGINE 200 MG: 100 TABLET ORAL at 09:05

## 2017-10-13 NOTE — PROGRESS NOTES
Progress Note - Neurology   Josef Parmar 40 y o  female MRN: [id number]  Unit/Bed#: [facility] 715-01 Encounter: [id number]    Assessment:  3 40year old female with Lennox Gastaut syndrome with continued seizure activity, primarily during sleep  2  Pneumonia  3  Severe intellectual disability    Plan:  1  Recommend continue on Onfi 10 mg BID, Valproic acid 250 mg Q8H, Banzel 1600 mg Q12H and Topiramate 250 mg BID  2  Given low Topiramate level, will give 200 mg dose now  3  Plan to discontinue Lamictal and start Keppra  Will give Keppra 3 G IV loading dose and start Keppra 1000 mg BID  4  Continue with seizure precautions  5  Continue to monitor on video EEG  6  Continue to treat pneumonia as per primary service  7  Monitor exam and notify with changes  8  Above case and plan discussed with Dr Terence Loco and he is in agreement with above  Subjective:   Pako Arrieta is a 40year old female with PMH of Lennox Gastaut syndrome, remote history of anoxic brain injury and severe intellectual disability who presented with increased frequency of seizures in setting of sepsis thought to be secondary to pneumonia  Per bedside RN, patient has not had any obvious seizures today       ROS:  Unable to reliably assess secondary to mental status     Medications  Scheduled Meds:  artificial tear 1 application Ophthalmic TID   cefepime 2,000 mg Intravenous Q12H   cloBAZam 10 mg Per NG Tube BID   enoxaparin 40 mg Subcutaneous Daily   lactulose 20 g Per PEG Tube BID   lamoTRIgine 200 mg Per G Tube BID   levOCARNitine 100 mg Oral TID   multivitamin-minerals 1 tablet Per G Tube Daily   ofloxacin 1 drop Both Eyes 4x Daily   [START ON 10/14/2017] Perampanel 4 mg Oral Daily   rufinamide 1,600 mg Per G Tube BID   topiramate 250 mg Per G Tube BID   Valproic Acid 250 mg Per G Tube Q8H   vancomycin 15 mg/kg Intravenous Q12H     Continuous Infusions:   PRN Meds:   acetaminophen    bisacodyl    magnesium hydroxide    ondansetron   ondansetron    Vitals: Blood pressure 95/60, pulse 74, temperature 97 9 °F (36 6 °C), temperature source Axillary, resp  rate 16, height 5' 2" (1 575 m), weight 46 4 kg (102 lb 4 7 oz), SpO2 97 %  ,Body mass index is 17 56 kg/m²  Physical Exam: BP 95/60   Pulse 74   Temp 97 9 °F (36 6 °C) (Axillary)   Resp 16   Ht 5' 2" (1 575 m) Comment: noted per prior adm hx in [employer]  Wt 46 4 kg (102 lb 4 7 oz)   LMP  (LMP Unknown)   SpO2 97%   BMI 17 56 kg/m²   General appearance: Awake and alert, nonverbal, tracks examiner in room and appears purposeful by closing eyes when attempting to examine her  Head: Normocephalic, without obvious abnormality, atraumatic, EEG leads in place  Eyes: negative findings: lids and lashes normal, conjunctivae and sclerae normal and pupils equal, round, reactive to light and accomodation  Lungs: clear to auscultation bilaterally  Heart: regular rate and rhythm  Abdomen: Soft, not distended  Extremities: extremities normal, atraumatic, no cyanosis or edema  Skin: Skin color, texture, turgor normal  No rashes or lesions  Neurologic: Mental status: Awake and alert, nonverbal  Cranial nerves: II: pupils equal, round, reactive to light and accommodation, III,VII: ptosis No ptosis   Sensory: Unable to reliably assess secondary to mental status  Motor:Unable to reliably assess but patient moving B/L UE well      Labs  Recent Results (from the past 24 hour(s))   CBC and differential    Collection Time: 10/13/17  4:49 AM   Result Value Ref Range    WBC 8 29 4 31 - 10 16 Thousand/uL    RBC 3 42 (L) 3 81 - 5 12 Million/uL    Hemoglobin 11 1 (L) 11 5 - 15 4 g/dL    Hematocrit 34 2 (L) 34 8 - 46 1 %     (H) 82 - 98 fL    MCH 32 5 26 8 - 34 3 pg    MCHC 32 5 31 4 - 37 4 g/dL    RDW 14 0 11 6 - 15 1 %    MPV 12 8 (H) 8 9 - 12 7 fL    Platelets 219 343 - 521 Thousands/uL    nRBC 0 /100 WBCs    Neutrophils Relative 53 43 - 75 %    Lymphocytes Relative 33 14 - 44 %    Monocytes Relative 10 4 - 12 %    Eosinophils Relative 4 0 - 6 %    Basophils Relative 0 0 - 1 %    Neutrophils Absolute 4 26 1 85 - 7 62 Thousands/µL    Lymphocytes Absolute 2 77 0 60 - 4 47 Thousands/µL    Monocytes Absolute 0 86 0 17 - 1 22 Thousand/µL    Eosinophils Absolute 0 36 0 00 - 0 61 Thousand/µL    Basophils Absolute 0 02 0 00 - 0 10 Thousands/µL   Basic metabolic panel    Collection Time: 10/13/17  4:49 AM   Result Value Ref Range    Sodium 142 136 - 145 mmol/L    Potassium 3 6 3 5 - 5 3 mmol/L    Chloride 110 (H) 100 - 108 mmol/L    CO2 24 21 - 32 mmol/L    Anion Gap 8 4 - 13 mmol/L    BUN 9 5 - 25 mg/dL    Creatinine 0 39 (L) 0 60 - 1 30 mg/dL    Glucose 133 65 - 140 mg/dL    Calcium 8 6 8 3 - 10 1 mg/dL    eGFR 136 ml/min/1 73sq m     Imaging   No new neuro imaging available for review  VTE Prophylaxis: Enoxaparin (Lovenox)    Counseling / Coordination of Care  Total time spent today 30 minutes  Greater than 50% of total time was spent with the patient and / or family counseling and / or coordination of care  A description of the counseling / coordination of care: Discussed with epileptology and bedside RN  Reviewed medications and plan for medication changes with bedside RN

## 2017-10-13 NOTE — PROCEDURES
Continuous Video EEG  Long Term Monitoring    Patient Name:  Becky Virk  MRN: [id number]   :  1980 File #: Blake Morgan    Age: 40 y o  Encounter #: [id number]   Date performed: 10/12-10/13/2017 Referring Provider: Kelly Ferrara MD          Report date: 10/13/2017          Study type: Continuous video EEG, up to 24 hours    ICD 10 diagnosis: Status Epilepticus  G40 [id number]    Start time: 10/12/2017 09:32  End time: 10/13/2017 08:00    Patient History:  Patient is 40 y o  female on continuous video EEG monitoring for the assessment of seizures, characterization of events, and effect of treatment  Patient has intractable epilepsy, Lennox-Gastaut syndrome, polypharmacy, and admission for healthcare acquired pneumonia  Patient's seizures consist of tonic posturing eyes rolling back and rhythmic jerking of the upper limbs  Current AEDs:  Medications include:   artificial tear 1 application Ophthalmic TID   cefepime 2,000 mg Intravenous Q12H   cloBAZam 10 mg Per NG Tube BID   enoxaparin 40 mg Subcutaneous Daily   lactulose 20 g Per PEG Tube BID   levETIRAcetam 3,000 mg Intravenous Once   levETIRAcetam 1,000 mg Per G Tube Q12H [facility]   levOCARNitine 100 mg Oral TID   multivitamin-minerals 1 tablet Per G Tube Daily   ofloxacin 1 drop Both Eyes 4x Daily   [START ON 10/14/2017] Perampanel 4 mg Oral Daily   rufinamide 1,600 mg Per G Tube BID   topiramate 250 mg Per G Tube BID   Valproic Acid 250 mg Per G Tube Q8H   vancomycin 15 mg/kg Intravenous Q12H       Description of Procedure:   A 24 hours continuous video EEG was performed with electrodes applied using the International 10-20 System at least 16 channels are reviewed and formatted into longitudinal bipolar, transverse bipolar, and referential (to common reference or calculated common reference) montages  Additional electrodes used included T1, T2, and extraocular electrodes, and ECG, along with video recording   The EEG was recorded with the patient awake, drowsy, and asleep state  A monitoring technologist supervised the continuous recording  The recording was technically satisfactory  Findings:   Background Activity: The background is grossly symmetric with respect to voltages and activities  During wakefulness, the background has excess low voltage beta activity, diffuse low-moderate voltage 5-6 Hz theta activity, and intermittent moderate voltage polymorphic 1-2 Hz delta activity with embedded features of spike-slow waves  During sleep there are diffuse theta activity, sleep spindles, and delta waves  Abnormal findings: There are frequent moderate to high voltage generalized spike and polyspike slow wave complexes, at times, there can be lateralizing fragments of these generalized epileptiform discharges  By 10:10, these generalized     There are innumerable (nearly continuous) generalized tonic seizures during sleep  Frequently these seizures consist of generalized epileptiform discharge occur as 1 Hz generalized periodic discharges or start as 2-3 Hz high voltage polyspike-slow wave discharges and transitions to 1 Hz generalized discharges lasting for 30 seconds, sometimes these rhythmical discharges are associated with the patient becoming rigid, tonic posturing with the arms elevated and tense, and subtle jerking of the upper body, and eyes opening with upward eye deviation  Some of these seizure will start as generalized moderate-high voltage rhythmic alpha-beta discharges before slowing to the spike-slow wave complexes  Most of these generalized tonic seizures occur during the patient's sleep period, especially during slow wave sleep  However, there can be occasional tonic generalized seizures during wakefulness  During periods of wakefulness, these generalized epileptiform discharges are less continuous and intermittent    The patient is mostly awake between 22:00 and 05:00    04:24 - tonic seizure when awake  The EEG background is awake, the patient eyes are closed then she goes into a series of upper arm tonic posturing, head drop and recurrent tonic posturing the arms crossed in front of her  The EEG starts with 2 second electrodecrement followed by rhythmical fast activity and then sharp delta activity followed by 1 Hz generalized discharges  Other findings: The single lead ECG shows a normal, regular and sinus rhythm  Interpretation: This is an abnormal 22 5 hours continuous video EEG recording due to nearly continuous generalized discharges, slow background rhythm to the theta range, and extremely frequent generalized tonic seizures, mostly during sleep  These findings can be seen in a severe epileptic encephalopathy such as Lennox Gastaut Syndrome  Most of the seizures and continuous generalized spike-slow waves occur during sleep  These seizures are extremely refractory to medications  It is very unlikely to achieve seizure freedom, but goal would be the lessen the severity of the seizures        MD [facility]  [address] [place] [address]

## 2017-10-13 NOTE — PROGRESS NOTES
[facility] Progress Note  Patient: Becky Virk 40 y o  female   MRN: [id number]  PCP: Nish Zhong DO  Unit/Bed#: [facility] 715-01 Encounter: [id number]  Date Of Visit: 10/13/17    Assessment:    Principal Problem:    HCAP (healthcare-associated pneumonia)  Active Problems:    Sepsis ([facility] [address] )    Seizure ([facility] [address] )    Dysphagia    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[facility] [address] )      Plan:    · 1  HCAP- gram negative sepsis- on vancomycin and cefepime  · 2  H/o of seizure disorder- patient on vEEG  Neurology readjusting patient seizure medications  · 3  Dysphagia- has PEG  · 4   Lennox gastuat syndrome- on rufinamide  Neurology following  · 5  Hypotension- on IVF  · 6  Severe protein calorie malnutrition secondary to chronic illness- on PEG feeds  VTE Pharmacologic Prophylaxis:   Pharmacologic: Enoxaparin (Lovenox)  Mechanical VTE Prophylaxis in Place: Yes    Patient Centered Rounds: I have performed bedside rounds with nursing staff today  Discussions with Specialists or Other Care Team Provider:     Education and Discussions with Family / Patient:     Time Spent for Care: 20 minutes  More than 50% of total time spent on counseling and coordination of care as described above  Current Length of Stay: 3 day(s)    Current Patient Status: Inpatient   Certification Statement: The patient will continue to require additional inpatient hospital stay due to pneumonia, seizure    Discharge Plan / Estimated Discharge Date: once pneumonia resolves    Code Status: Level 1 - Full Code      Subjective:   Patient seen and examined at bedside  Patient nonverbal     Objective:     Vitals:   Temp (24hrs), Av 9 °F (36 6 °C), Min:96 7 °F (35 9 °C), Max:99 1 °F (37 3 °C)    HR:  [74-89] 84  Resp:  [16-18] 16  BP: (95-98)/(59-63) 96/61  SpO2:  [95 %-98 %] 97 %  Body mass index is 17 56 kg/m²  Input and Output Summary (last 24 hours):        Intake/Output Summary (Last 24 hours) at 10/13/17 4433  Last data filed at 10/13/17 0929   Gross per 24 hour   Intake              110 ml   Output                0 ml   Net              110 ml       Physical Exam:     Physical Exam   Constitutional: She appears well-developed and well-nourished  HENT:   Head: Normocephalic and atraumatic  Eyes: Conjunctivae and EOM are normal  Pupils are equal, round, and reactive to light  Neck: Normal range of motion  Neck supple  No JVD present  No tracheal deviation present  No thyromegaly present  Cardiovascular: Normal rate, regular rhythm and normal heart sounds  Exam reveals no gallop and no friction rub  No murmur heard  Pulmonary/Chest: Effort normal and breath sounds normal  No respiratory distress  She has no wheezes  She has no rales  Abdominal: Soft  Bowel sounds are normal  She exhibits no distension  There is no tenderness  There is no rebound  +PEG   Musculoskeletal:   contracted   Neurological: She is alert  Vitals reviewed  Additional Data:     Labs:      Results from last 7 days  Lab Units 10/13/17  0449   WBC Thousand/uL 8 29   HEMOGLOBIN g/dL 11 1*   HEMATOCRIT % 34 2*   PLATELETS Thousands/uL 154   NEUTROS PCT % 53   LYMPHS PCT % 33   MONOS PCT % 10   EOS PCT % 4       Results from last 7 days  Lab Units 10/13/17  0449  10/10/17  0423   SODIUM mmol/L 142  < > 142   POTASSIUM mmol/L 3 6  < > 3 7   CHLORIDE mmol/L 110*  < > 107   CO2 mmol/L 24  < > 20*   BUN mg/dL 9  < > 16   CREATININE mg/dL 0 39*  < > 0 60   CALCIUM mg/dL 8 6  < > 8 5   TOTAL PROTEIN g/dL  --   --  6 8   BILIRUBIN TOTAL mg/dL  --   --  0 20   ALK PHOS U/L  --   --  93   ALT U/L  --   --  34   AST U/L  --   --  22   GLUCOSE RANDOM mg/dL 133  < > 141*   < > = values in this interval not displayed  Results from last 7 days  Lab Units 10/10/17  0423   INR  1 05       * I Have Reviewed All Lab Data Listed Above  * Additional Pertinent Lab Tests Reviewed:  [address] Admission Reviewed    Imaging:    Imaging Reports Reviewed Today Include:   Imaging Personally Reviewed by Myself Includes:      Recent Cultures (last 7 days):       Results from last 7 days  Lab Units 10/10/17  0423   BLOOD CULTURE  No Growth at 72 hrs  No Growth at 72 hrs  Last 24 Hours Medication List:     artificial tear 1 application Ophthalmic TID   cefepime 2,000 mg Intravenous Q12H   cloBAZam 10 mg Per NG Tube BID   enoxaparin 40 mg Subcutaneous Daily   lactulose 20 g Per PEG Tube BID   levETIRAcetam 3,000 mg Intravenous Once   levETIRAcetam 1,000 mg Per G Tube Q12H [address]   levOCARNitine 100 mg Oral TID   multivitamin-minerals 1 tablet Per G Tube Daily   ofloxacin 1 drop Both Eyes 4x Daily   [START ON 10/14/2017] Perampanel 4 mg Oral Daily   rufinamide 1,600 mg Per G Tube BID   topiramate 250 mg Per G Tube BID   Valproic Acid 250 mg Per G Tube Q8H   vancomycin 15 mg/kg Intravenous Q12H        Today, Patient Was Seen By: Ana Penaloza MD    ** Please Note: This note has been constructed using a voice recognition system   **

## 2017-10-14 ENCOUNTER — GENERIC CONVERSION - ENCOUNTER (OUTPATIENT)
Dept: OTHER | Facility: OTHER | Age: 37
End: 2017-10-14

## 2017-10-14 ENCOUNTER — APPOINTMENT (INPATIENT)
Dept: NEUROLOGY | Facility: AMBULATORY SURGERY CENTER | Age: 37
DRG: 871 | End: 2017-10-14
Payer: MEDICARE

## 2017-10-14 LAB
ANION GAP SERPL CALCULATED.3IONS-SCNC: 7 MMOL/L (ref 4–13)
BASOPHILS # BLD AUTO: 0.02 THOUSANDS/ΜL (ref 0–0.1)
BASOPHILS NFR BLD AUTO: 0 % (ref 0–1)
BUN SERPL-MCNC: 11 MG/DL (ref 5–25)
CALCIUM SERPL-MCNC: 8.7 MG/DL (ref 8.3–10.1)
CHLORIDE SERPL-SCNC: 110 MMOL/L (ref 100–108)
CO2 SERPL-SCNC: 25 MMOL/L (ref 21–32)
CREAT SERPL-MCNC: 0.36 MG/DL (ref 0.6–1.3)
EOSINOPHIL # BLD AUTO: 0.46 THOUSAND/ΜL (ref 0–0.61)
EOSINOPHIL NFR BLD AUTO: 7 % (ref 0–6)
ERYTHROCYTE [DISTWIDTH] IN BLOOD BY AUTOMATED COUNT: 14 % (ref 11.6–15.1)
GFR SERPL CREATININE-BSD FRML MDRD: 139 ML/MIN/1.73SQ M
GLUCOSE SERPL-MCNC: 102 MG/DL (ref 65–140)
HCT VFR BLD AUTO: 34 % (ref 34.8–46.1)
HGB BLD-MCNC: 11.2 G/DL (ref 11.5–15.4)
LYMPHOCYTES # BLD AUTO: 2.87 THOUSANDS/ΜL (ref 0.6–4.47)
LYMPHOCYTES NFR BLD AUTO: 45 % (ref 14–44)
MCH RBC QN AUTO: 33.1 PG (ref 26.8–34.3)
MCHC RBC AUTO-ENTMCNC: 32.9 G/DL (ref 31.4–37.4)
MCV RBC AUTO: 101 FL (ref 82–98)
MONOCYTES # BLD AUTO: 0.79 THOUSAND/ΜL (ref 0.17–1.22)
MONOCYTES NFR BLD AUTO: 12 % (ref 4–12)
NEUTROPHILS # BLD AUTO: 2.3 THOUSANDS/ΜL (ref 1.85–7.62)
NEUTS SEG NFR BLD AUTO: 36 % (ref 43–75)
NRBC BLD AUTO-RTO: 0 /100 WBCS
PLATELET # BLD AUTO: 173 THOUSANDS/UL (ref 149–390)
PMV BLD AUTO: 12 FL (ref 8.9–12.7)
POTASSIUM SERPL-SCNC: 4.1 MMOL/L (ref 3.5–5.3)
RBC # BLD AUTO: 3.38 MILLION/UL (ref 3.81–5.12)
RUFINAMIDE SERPL-MCNC: 13.8 UG/ML
SODIUM SERPL-SCNC: 142 MMOL/L (ref 136–145)
WBC # BLD AUTO: 6.45 THOUSAND/UL (ref 4.31–10.16)

## 2017-10-14 PROCEDURE — 95951 HB EEG MONITORING/VIDEORECORD: CPT

## 2017-10-14 PROCEDURE — 80177 DRUG SCRN QUAN LEVETIRACETAM: CPT | Performed by: PSYCHIATRY & NEUROLOGY

## 2017-10-14 PROCEDURE — 94762 N-INVAS EAR/PLS OXIMTRY CONT: CPT

## 2017-10-14 PROCEDURE — 80048 BASIC METABOLIC PNL TOTAL CA: CPT | Performed by: INTERNAL MEDICINE

## 2017-10-14 PROCEDURE — 85025 COMPLETE CBC W/AUTO DIFF WBC: CPT | Performed by: INTERNAL MEDICINE

## 2017-10-14 RX ADMIN — LACTULOSE 20 G: 20 SOLUTION ORAL at 08:25

## 2017-10-14 RX ADMIN — TOPIRAMATE 250 MG: 100 TABLET, FILM COATED ORAL at 08:26

## 2017-10-14 RX ADMIN — VALPROIC ACID 250 MG: 250 SOLUTION ORAL at 17:17

## 2017-10-14 RX ADMIN — CEFEPIME HYDROCHLORIDE 2000 MG: 2 INJECTION, POWDER, FOR SOLUTION INTRAVENOUS at 00:43

## 2017-10-14 RX ADMIN — OFLOXACIN 1 DROP: 3 SOLUTION OPHTHALMIC at 17:19

## 2017-10-14 RX ADMIN — LEVETIRACETAM 1000 MG: 100 SOLUTION ORAL at 21:28

## 2017-10-14 RX ADMIN — LEVOCARNITINE 500 MG: 1 SOLUTION ORAL at 21:29

## 2017-10-14 RX ADMIN — OFLOXACIN 1 DROP: 3 SOLUTION OPHTHALMIC at 21:28

## 2017-10-14 RX ADMIN — LEVETIRACETAM 1000 MG: 100 SOLUTION ORAL at 08:25

## 2017-10-14 RX ADMIN — SODIUM CHLORIDE, POTASSIUM CHLORIDE, SODIUM LACTATE AND CALCIUM CHLORIDE 500 ML: 600; 310; 30; 20 INJECTION, SOLUTION INTRAVENOUS at 04:56

## 2017-10-14 RX ADMIN — LEVOCARNITINE 500 MG: 1 SOLUTION ORAL at 17:18

## 2017-10-14 RX ADMIN — VANCOMYCIN HYDROCHLORIDE 750 MG: 750 INJECTION, SOLUTION INTRAVENOUS at 12:04

## 2017-10-14 RX ADMIN — LEVOCARNITINE 500 MG: 1 SOLUTION ORAL at 08:25

## 2017-10-14 RX ADMIN — VALPROIC ACID 250 MG: 250 SOLUTION ORAL at 10:03

## 2017-10-14 RX ADMIN — TOPIRAMATE 250 MG: 100 TABLET, FILM COATED ORAL at 17:18

## 2017-10-14 RX ADMIN — MINERAL OIL AND WHITE PETROLATUM 1 APPLICATION: 150; 830 OINTMENT OPHTHALMIC at 17:19

## 2017-10-14 RX ADMIN — OFLOXACIN 1 DROP: 3 SOLUTION OPHTHALMIC at 08:27

## 2017-10-14 RX ADMIN — MINERAL OIL AND WHITE PETROLATUM 1 APPLICATION: 150; 830 OINTMENT OPHTHALMIC at 08:27

## 2017-10-14 RX ADMIN — RUFINAMIDE 1600 MG: 200 TABLET, FILM COATED ORAL at 17:18

## 2017-10-14 RX ADMIN — CLOBAZAM 10 MG: 10 TABLET ORAL at 08:26

## 2017-10-14 RX ADMIN — CEFEPIME HYDROCHLORIDE 2000 MG: 2 INJECTION, POWDER, FOR SOLUTION INTRAVENOUS at 11:08

## 2017-10-14 RX ADMIN — PERAMPANEL 4 MG: 4 TABLET ORAL at 08:26

## 2017-10-14 RX ADMIN — RUFINAMIDE 1600 MG: 200 TABLET, FILM COATED ORAL at 10:04

## 2017-10-14 RX ADMIN — VANCOMYCIN HYDROCHLORIDE 750 MG: 750 INJECTION, SOLUTION INTRAVENOUS at 02:17

## 2017-10-14 RX ADMIN — CLOBAZAM 10 MG: 10 TABLET ORAL at 17:18

## 2017-10-14 RX ADMIN — LACTULOSE 20 G: 20 SOLUTION ORAL at 17:17

## 2017-10-14 RX ADMIN — ENOXAPARIN SODIUM 40 MG: 40 INJECTION SUBCUTANEOUS at 08:26

## 2017-10-14 RX ADMIN — VALPROIC ACID 250 MG: 250 SOLUTION ORAL at 02:21

## 2017-10-14 RX ADMIN — MINERAL OIL AND WHITE PETROLATUM 1 APPLICATION: 150; 830 OINTMENT OPHTHALMIC at 21:27

## 2017-10-14 RX ADMIN — Medication 1 TABLET: at 08:26

## 2017-10-14 NOTE — PLAN OF CARE
DISCHARGE PLANNING     Discharge to home or other facility with appropriate resources Progressing        DISCHARGE PLANNING - CARE MANAGEMENT     Discharge to post-acute care or home with appropriate resources Progressing        INFECTION - ADULT     Absence or prevention of progression during hospitalization Progressing     Absence of fever/infection during neutropenic period Progressing        Knowledge Deficit     Patient/family/caregiver demonstrates understanding of disease process, treatment plan, medications, and discharge instructions Progressing        METABOLIC, FLUID AND ELECTROLYTES - ADULT     Electrolytes maintained within normal limits Progressing     Fluid balance maintained Progressing        NEUROSENSORY - ADULT     Achieves stable or improved neurological status Progressing     Absence of seizures Progressing     Remains free of injury related to seizures activity Progressing     Achieves maximal functionality and self care Progressing        Nutrition/Hydration-ADULT     Nutrient/Hydration intake appropriate for improving, restoring or maintaining nutritional needs Progressing        Potential for Falls     Patient will remain free of falls Progressing        Prexisting or High Potential for Compromised Skin Integrity     Skin integrity is maintained or improved Progressing        RESPIRATORY - ADULT     Achieves optimal ventilation and oxygenation Progressing        SAFETY ADULT     Maintain or return to baseline ADL function Progressing     Maintain or return mobility status to optimal level Progressing

## 2017-10-14 NOTE — PROGRESS NOTES
[facility] Internal Medicine Progress Note  Patient: Berenice Pichardo 40 y o  female   MRN: [id number]  PCP: Lawson Bautista DO  [facility] Encounter: [id number]  Date Of Visit: 10/14/17    Assessment:    Principal Problem:    HCAP (healthcare-associated pneumonia)  Active Problems:    Sepsis ([facility] [address] )    Seizure ([facility] [address] )    Dysphagia    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[facility] [address] )      Plan:    · 1  HCAP- gram negative sepsis- on vancomycin and cefepime  · 2  H/o of seizure disorder- patient on vEEG  Neurology readjusting patient seizure medications  · 3  Dysphagia- has PEG  · 4   Lennox gastuat syndrome- on rufinamide  Neurology following  · 5  Hypotension- on IVF  · 6  Severe protein calorie malnutrition secondary to chronic illness- on PEG feeds  VTE Pharmacologic Prophylaxis:   Pharmacologic: Enoxaparin (Lovenox)  Mechanical VTE Prophylaxis in Place: Yes    Patient Centered Rounds: I have performed bedside rounds with nursing staff today  Discussions with Specialists or Other Care Team Provider:     Education and Discussions with Family / Patient:     Time Spent for Care: 20 minutes  More than 50% of total time spent on counseling and coordination of care as described above  Current Length of Stay: 4 day(s)    Current Patient Status: Inpatient   Certification Statement: The patient will continue to require additional inpatient hospital stay due to pneumonia, seizure    Discharge Plan / Estimated Discharge Date: once pneumonia resolves    Code Status: Level 1 - Full Code      Subjective:   Patient seen and examined at bedside  Patient nonverbal     Objective:     Vitals:   Temp (24hrs), Av 9 °F (36 6 °C), Min:97 6 °F (36 4 °C), Max:98 2 °F (36 8 °C)    HR:  [66-80] 80  Resp:  [16-18] 18  BP: ()/(47-65) 110/64  SpO2:  [97 %-99 %] 99 %  Body mass index is 17 56 kg/m²  Input and Output Summary (last 24 hours):        Intake/Output Summary (Last 24 hours) at 10/14/17 1318  Last data filed at 10/14/17 1204   Gross per 24 hour   Intake              603 ml   Output                0 ml   Net              603 ml       Physical Exam:     Physical Exam   Constitutional: She appears well-developed and well-nourished  HENT:   Head: Normocephalic and atraumatic  Eyes: Conjunctivae and EOM are normal  Pupils are equal, round, and reactive to light  Neck: Normal range of motion  Neck supple  No JVD present  No tracheal deviation present  No thyromegaly present  Cardiovascular: Normal rate, regular rhythm and normal heart sounds  Exam reveals no gallop and no friction rub  No murmur heard  Pulmonary/Chest: Effort normal and breath sounds normal  No respiratory distress  She has no wheezes  She has no rales  Abdominal: Soft  Bowel sounds are normal  She exhibits no distension  There is no tenderness  There is no rebound  +PEG   Musculoskeletal:   contracted   Neurological: She is alert  Vitals reviewed  Additional Data:     Labs:      Results from last 7 days  Lab Units 10/14/17  0612   WBC Thousand/uL 6 45   HEMOGLOBIN g/dL 11 2*   HEMATOCRIT % 34 0*   PLATELETS Thousands/uL 173   NEUTROS PCT % 36*   LYMPHS PCT % 45*   MONOS PCT % 12   EOS PCT % 7*       Results from last 7 days  Lab Units 10/14/17  0612  10/10/17  0423   SODIUM mmol/L 142  < > 142   POTASSIUM mmol/L 4 1  < > 3 7   CHLORIDE mmol/L 110*  < > 107   CO2 mmol/L 25  < > 20*   BUN mg/dL 11  < > 16   CREATININE mg/dL 0 36*  < > 0 60   CALCIUM mg/dL 8 7  < > 8 5   TOTAL PROTEIN g/dL  --   --  6 8   BILIRUBIN TOTAL mg/dL  --   --  0 20   ALK PHOS U/L  --   --  93   ALT U/L  --   --  34   AST U/L  --   --  22   GLUCOSE RANDOM mg/dL 102  < > 141*   < > = values in this interval not displayed  Results from last 7 days  Lab Units 10/10/17  0423   INR  1 05       * I Have Reviewed All Lab Data Listed Above  * Additional Pertinent Lab Tests Reviewed:  [address] Admission Reviewed    Imaging:    Imaging Reports Reviewed Today Include:   Imaging Personally Reviewed by Myself Includes:      Recent Cultures (last 7 days):       Results from last 7 days  Lab Units 10/10/17  0423   BLOOD CULTURE  No Growth After 4 Days  No Growth After 4 Days  Last 24 Hours Medication List:     artificial tear 1 application Ophthalmic TID   cefepime 2,000 mg Intravenous Q12H   cloBAZam 10 mg Per NG Tube BID   enoxaparin 40 mg Subcutaneous Daily   lactulose 20 g Per PEG Tube BID   levETIRAcetam 1,000 mg Per G Tube Q12H [address]   levOCARNitine 500 mg Oral TID   multivitamin-minerals 1 tablet Per G Tube Daily   ofloxacin 1 drop Both Eyes 4x Daily   Perampanel 4 mg Oral Daily   rufinamide 1,600 mg Per G Tube BID   topiramate 250 mg Per G Tube BID   Valproic Acid 250 mg Per G Tube Q8H   vancomycin 15 mg/kg Intravenous Q12H        Today, Patient Was Seen By: Seng Lantigua MD    ** Please Note: This note has been constructed using a voice recognition system   **

## 2017-10-15 ENCOUNTER — GENERIC CONVERSION - ENCOUNTER (OUTPATIENT)
Dept: OTHER | Facility: OTHER | Age: 37
End: 2017-10-15

## 2017-10-15 ENCOUNTER — APPOINTMENT (INPATIENT)
Dept: NEUROLOGY | Facility: AMBULATORY SURGERY CENTER | Age: 37
DRG: 871 | End: 2017-10-15
Payer: MEDICARE

## 2017-10-15 LAB
BACTERIA BLD CULT: NORMAL
BACTERIA BLD CULT: NORMAL

## 2017-10-15 PROCEDURE — 95951 HB EEG MONITORING/VIDEORECORD: CPT

## 2017-10-15 PROCEDURE — 94762 N-INVAS EAR/PLS OXIMTRY CONT: CPT

## 2017-10-15 RX ADMIN — LEVOCARNITINE 500 MG: 1 SOLUTION ORAL at 21:23

## 2017-10-15 RX ADMIN — CLOBAZAM 10 MG: 10 TABLET ORAL at 09:05

## 2017-10-15 RX ADMIN — VALPROIC ACID 250 MG: 250 SOLUTION ORAL at 02:43

## 2017-10-15 RX ADMIN — TOPIRAMATE 250 MG: 100 TABLET, FILM COATED ORAL at 09:05

## 2017-10-15 RX ADMIN — ENOXAPARIN SODIUM 40 MG: 40 INJECTION SUBCUTANEOUS at 09:05

## 2017-10-15 RX ADMIN — VALPROIC ACID 250 MG: 250 SOLUTION ORAL at 18:09

## 2017-10-15 RX ADMIN — MINERAL OIL AND WHITE PETROLATUM 1 APPLICATION: 150; 830 OINTMENT OPHTHALMIC at 09:05

## 2017-10-15 RX ADMIN — CEFEPIME HYDROCHLORIDE 2000 MG: 2 INJECTION, POWDER, FOR SOLUTION INTRAVENOUS at 11:31

## 2017-10-15 RX ADMIN — CEFEPIME HYDROCHLORIDE 2000 MG: 2 INJECTION, POWDER, FOR SOLUTION INTRAVENOUS at 01:03

## 2017-10-15 RX ADMIN — VANCOMYCIN HYDROCHLORIDE 750 MG: 750 INJECTION, SOLUTION INTRAVENOUS at 02:18

## 2017-10-15 RX ADMIN — RUFINAMIDE 1600 MG: 200 TABLET, FILM COATED ORAL at 10:28

## 2017-10-15 RX ADMIN — VALPROIC ACID 250 MG: 250 SOLUTION ORAL at 10:28

## 2017-10-15 RX ADMIN — CLOBAZAM 10 MG: 10 TABLET ORAL at 17:08

## 2017-10-15 RX ADMIN — TOPIRAMATE 250 MG: 100 TABLET, FILM COATED ORAL at 17:08

## 2017-10-15 RX ADMIN — LEVETIRACETAM 1000 MG: 100 SOLUTION ORAL at 21:23

## 2017-10-15 RX ADMIN — OFLOXACIN 1 DROP: 3 SOLUTION OPHTHALMIC at 11:32

## 2017-10-15 RX ADMIN — RUFINAMIDE 1600 MG: 200 TABLET, FILM COATED ORAL at 17:09

## 2017-10-15 RX ADMIN — LEVETIRACETAM 1000 MG: 100 SOLUTION ORAL at 09:08

## 2017-10-15 RX ADMIN — MINERAL OIL AND WHITE PETROLATUM 1 APPLICATION: 150; 830 OINTMENT OPHTHALMIC at 17:08

## 2017-10-15 RX ADMIN — LACTULOSE 20 G: 20 SOLUTION ORAL at 17:08

## 2017-10-15 RX ADMIN — VANCOMYCIN HYDROCHLORIDE 750 MG: 750 INJECTION, SOLUTION INTRAVENOUS at 12:31

## 2017-10-15 RX ADMIN — OFLOXACIN 1 DROP: 3 SOLUTION OPHTHALMIC at 09:05

## 2017-10-15 RX ADMIN — LEVOCARNITINE 500 MG: 1 SOLUTION ORAL at 09:08

## 2017-10-15 RX ADMIN — PERAMPANEL 4 MG: 4 TABLET ORAL at 09:09

## 2017-10-15 RX ADMIN — Medication 1 TABLET: at 09:05

## 2017-10-15 RX ADMIN — MINERAL OIL AND WHITE PETROLATUM 1 APPLICATION: 150; 830 OINTMENT OPHTHALMIC at 21:24

## 2017-10-15 RX ADMIN — OFLOXACIN 1 DROP: 3 SOLUTION OPHTHALMIC at 21:24

## 2017-10-15 RX ADMIN — LEVOCARNITINE 500 MG: 1 SOLUTION ORAL at 17:09

## 2017-10-15 RX ADMIN — OFLOXACIN 1 DROP: 3 SOLUTION OPHTHALMIC at 17:09

## 2017-10-15 RX ADMIN — LACTULOSE 20 G: 20 SOLUTION ORAL at 09:05

## 2017-10-15 NOTE — PROGRESS NOTES
[facility] Internal Medicine Progress Note  Patient: Geoffrey Jarvis 40 y o  female   MRN: [id number]  PCP: Carrie Guadarrama DO  Unit/Bed#: [facility] 715-01 Encounter: [id number]  Date Of Visit: 10/15/17    Assessment:    Principal Problem:    HCAP (healthcare-associated pneumonia)  Active Problems:    Sepsis ([facility] [address] )    Seizure ([facility] [address] )    Dysphagia    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[facility] [address] )      Plan:    · 1  HCAP- gram negative sepsis- on vancomycin and cefepime  · 2  H/o of seizure disorder- patient on vEEG  Neurology readjusting patient seizure medications  On fycompa, clobazam, keppra, ruinamide, depakote, and topamax  · 3  Dysphagia- has PEG  · 4   Lennox gastuat syndrome- on rufinamide  Neurology following  · 5  Hypotension- on IVF  · 6  Severe protein calorie malnutrition secondary to chronic illness- on PEG feeds  VTE Pharmacologic Prophylaxis:   Pharmacologic: Enoxaparin (Lovenox)  Mechanical VTE Prophylaxis in Place: Yes    Patient Centered Rounds: I have performed bedside rounds with nursing staff today  Discussions with Specialists or Other Care Team Provider:     Education and Discussions with Family / Patient:     Time Spent for Care: 20 minutes  More than 50% of total time spent on counseling and coordination of care as described above  Current Length of Stay: 5 day(s)    Current Patient Status: Inpatient   Certification Statement: The patient will continue to require additional inpatient hospital stay due to pneumonia, seizure    Discharge Plan / Estimated Discharge Date: once pneumonia resolves    Code Status: Level 1 - Full Code      Subjective:   Patient seen and examined at bedside  Patient nonverbal     Objective:     Vitals:   Temp (24hrs), Av 7 °F (36 5 °C), Min:97 4 °F (36 3 °C), Max:98 2 °F (36 8 °C)    HR:  [70-93] 93  Resp:  [16-18] 18  BP: (83-98)/(48-65) 84/48  SpO2:  [97 %-100 %] 97 %  Body mass index is 17 56 kg/m²  Input and Output Summary (last 24 hours):        Intake/Output Summary (Last 24 hours) at 10/15/17 1448  Last data filed at 10/15/17 0904   Gross per 24 hour   Intake             1125 ml   Output                0 ml   Net             1125 ml       Physical Exam:     Physical Exam   Constitutional: She appears well-developed and well-nourished  HENT:   Head: Normocephalic and atraumatic  Eyes: Conjunctivae and EOM are normal  Pupils are equal, round, and reactive to light  Neck: Normal range of motion  Neck supple  No JVD present  No tracheal deviation present  No thyromegaly present  Cardiovascular: Normal rate, regular rhythm and normal heart sounds  Exam reveals no gallop and no friction rub  No murmur heard  Pulmonary/Chest: Effort normal and breath sounds normal  No respiratory distress  She has no wheezes  She has no rales  Abdominal: Soft  Bowel sounds are normal  She exhibits no distension  There is no tenderness  There is no rebound  +PEG   Musculoskeletal:   contracted   Neurological: She is alert  Vitals reviewed  Additional Data:     Labs:      Results from last 7 days  Lab Units 10/14/17  0612   WBC Thousand/uL 6 45   HEMOGLOBIN g/dL 11 2*   HEMATOCRIT % 34 0*   PLATELETS Thousands/uL 173   NEUTROS PCT % 36*   LYMPHS PCT % 45*   MONOS PCT % 12   EOS PCT % 7*       Results from last 7 days  Lab Units 10/14/17  0612  10/10/17  0423   SODIUM mmol/L 142  < > 142   POTASSIUM mmol/L 4 1  < > 3 7   CHLORIDE mmol/L 110*  < > 107   CO2 mmol/L 25  < > 20*   BUN mg/dL 11  < > 16   CREATININE mg/dL 0 36*  < > 0 60   CALCIUM mg/dL 8 7  < > 8 5   TOTAL PROTEIN g/dL  --   --  6 8   BILIRUBIN TOTAL mg/dL  --   --  0 20   ALK PHOS U/L  --   --  93   ALT U/L  --   --  34   AST U/L  --   --  22   GLUCOSE RANDOM mg/dL 102  < > 141*   < > = values in this interval not displayed  Results from last 7 days  Lab Units 10/10/17  0423   INR  1 05       * I Have Reviewed All Lab Data Listed Above  * Additional Pertinent Lab Tests Reviewed:  All Labs For Current Hospital Admission Reviewed    Imaging:    Imaging Reports Reviewed Today Include:   Imaging Personally Reviewed by Myself Includes:      Recent Cultures (last 7 days):       Results from last 7 days  Lab Units 10/10/17  0423   BLOOD CULTURE  No Growth After 5 Days  No Growth After 5 Days  Last 24 Hours Medication List:     artificial tear 1 application Ophthalmic TID   cefepime 2,000 mg Intravenous Q12H   cloBAZam 10 mg Per NG Tube BID   enoxaparin 40 mg Subcutaneous Daily   lactulose 20 g Per PEG Tube BID   levETIRAcetam 1,000 mg Per G Tube Q12H [address]   levOCARNitine 500 mg Oral TID   multivitamin-minerals 1 tablet Per G Tube Daily   ofloxacin 1 drop Both Eyes 4x Daily   Perampanel 4 mg Oral Daily   rufinamide 1,600 mg Per G Tube BID   topiramate 250 mg Per G Tube BID   Valproic Acid 250 mg Per G Tube Q8H   vancomycin 15 mg/kg Intravenous Q12H        Today, Patient Was Seen By: Britt Wilson MD    ** Please Note: This note has been constructed using a voice recognition system   **

## 2017-10-15 NOTE — PROGRESS NOTES
Pt has been awake since 0200, she plays with her toys, she has been vocal but difficult to comprehend what she is saying  She has tried to get out of bed and was assisted to stand at the bedside  She stood with assistance but unstable by herself  She had about 10 spoons of rice pudding as well

## 2017-10-16 ENCOUNTER — GENERIC CONVERSION - ENCOUNTER (OUTPATIENT)
Dept: OTHER | Facility: OTHER | Age: 37
End: 2017-10-16

## 2017-10-16 LAB — LEVETIRACETAM SERPL-MCNC: 36 UG/ML (ref 10–40)

## 2017-10-16 PROCEDURE — 94762 N-INVAS EAR/PLS OXIMTRY CONT: CPT

## 2017-10-16 PROCEDURE — 94760 N-INVAS EAR/PLS OXIMETRY 1: CPT

## 2017-10-16 RX ORDER — CLOBAZAM 10 MG/1
10 TABLET ORAL
Status: DISCONTINUED | OUTPATIENT
Start: 2017-10-16 | End: 2017-10-18

## 2017-10-16 RX ORDER — LEVOCARNITINE 1 G/10ML
500 SOLUTION ORAL 3 TIMES DAILY
Status: DISCONTINUED | OUTPATIENT
Start: 2017-10-16 | End: 2017-10-26 | Stop reason: HOSPADM

## 2017-10-16 RX ADMIN — CEFEPIME HYDROCHLORIDE 2000 MG: 2 INJECTION, POWDER, FOR SOLUTION INTRAVENOUS at 02:18

## 2017-10-16 RX ADMIN — LEVETIRACETAM 1000 MG: 100 SOLUTION ORAL at 21:55

## 2017-10-16 RX ADMIN — ENOXAPARIN SODIUM 40 MG: 40 INJECTION SUBCUTANEOUS at 08:26

## 2017-10-16 RX ADMIN — OFLOXACIN 1 DROP: 3 SOLUTION OPHTHALMIC at 08:26

## 2017-10-16 RX ADMIN — VALPROIC ACID 250 MG: 250 SOLUTION ORAL at 04:17

## 2017-10-16 RX ADMIN — TOPIRAMATE 250 MG: 100 TABLET, FILM COATED ORAL at 08:26

## 2017-10-16 RX ADMIN — OFLOXACIN 1 DROP: 3 SOLUTION OPHTHALMIC at 17:01

## 2017-10-16 RX ADMIN — PERAMPANEL 4 MG: 4 TABLET ORAL at 08:30

## 2017-10-16 RX ADMIN — LACTULOSE 20 G: 20 SOLUTION ORAL at 08:26

## 2017-10-16 RX ADMIN — LEVETIRACETAM 1000 MG: 100 SOLUTION ORAL at 08:27

## 2017-10-16 RX ADMIN — CEFEPIME HYDROCHLORIDE 2000 MG: 2 INJECTION, POWDER, FOR SOLUTION INTRAVENOUS at 23:41

## 2017-10-16 RX ADMIN — MINERAL OIL AND WHITE PETROLATUM 1 APPLICATION: 150; 830 OINTMENT OPHTHALMIC at 21:54

## 2017-10-16 RX ADMIN — RUFINAMIDE 1600 MG: 200 TABLET, FILM COATED ORAL at 17:00

## 2017-10-16 RX ADMIN — VALPROIC ACID 250 MG: 250 SOLUTION ORAL at 18:35

## 2017-10-16 RX ADMIN — LEVOCARNITINE 500 MG: 1 SOLUTION ORAL at 16:58

## 2017-10-16 RX ADMIN — LEVOCARNITINE 500 MG: 1 SOLUTION ORAL at 08:27

## 2017-10-16 RX ADMIN — VANCOMYCIN HYDROCHLORIDE 750 MG: 750 INJECTION, SOLUTION INTRAVENOUS at 12:44

## 2017-10-16 RX ADMIN — VANCOMYCIN HYDROCHLORIDE 750 MG: 750 INJECTION, SOLUTION INTRAVENOUS at 03:22

## 2017-10-16 RX ADMIN — MINERAL OIL AND WHITE PETROLATUM 1 APPLICATION: 150; 830 OINTMENT OPHTHALMIC at 08:26

## 2017-10-16 RX ADMIN — CEFEPIME HYDROCHLORIDE 2000 MG: 2 INJECTION, POWDER, FOR SOLUTION INTRAVENOUS at 11:33

## 2017-10-16 RX ADMIN — LACTULOSE 20 G: 20 SOLUTION ORAL at 17:00

## 2017-10-16 RX ADMIN — CLOBAZAM 10 MG: 10 TABLET ORAL at 22:01

## 2017-10-16 RX ADMIN — RUFINAMIDE 1600 MG: 200 TABLET, FILM COATED ORAL at 08:27

## 2017-10-16 RX ADMIN — OFLOXACIN 1 DROP: 3 SOLUTION OPHTHALMIC at 11:38

## 2017-10-16 RX ADMIN — MINERAL OIL AND WHITE PETROLATUM 1 APPLICATION: 150; 830 OINTMENT OPHTHALMIC at 16:58

## 2017-10-16 RX ADMIN — LEVOCARNITINE 500 MG: 1 SOLUTION ORAL at 22:25

## 2017-10-16 RX ADMIN — TOPIRAMATE 250 MG: 100 TABLET, FILM COATED ORAL at 17:00

## 2017-10-16 RX ADMIN — Medication 1 TABLET: at 08:26

## 2017-10-16 RX ADMIN — OFLOXACIN 1 DROP: 3 SOLUTION OPHTHALMIC at 21:58

## 2017-10-16 RX ADMIN — CLOBAZAM 10 MG: 10 TABLET ORAL at 08:26

## 2017-10-16 RX ADMIN — VALPROIC ACID 250 MG: 250 SOLUTION ORAL at 11:33

## 2017-10-16 NOTE — PROGRESS NOTES
Epilepsy Consult Follow-up Note  Patient Name: Geoffrey Jarvis  MRN: [id number]  Date: 10/16/17 Time: 11:25 AM     LOS: 6 days     Interval History:  EEG monitoring discontinued at 21:00 on 10/15  No clinical seizures identified by staff  Nurse states that she was awake from around 2 AM today until about 8 AM and has been sleeping since  ROS: Not able to perform  Medications   artificial tear 1 application Ophthalmic TID   cefepime 2,000 mg Intravenous Q12H   cloBAZam 10 mg Per NG Tube BID   enoxaparin 40 mg Subcutaneous Daily   lactulose 20 g Per PEG Tube BID   levETIRAcetam 1,000 mg Per G Tube Q12H [address]   levOCARNitine 500 mg Oral TID   multivitamin-minerals 1 tablet Per G Tube Daily   ofloxacin 1 drop Both Eyes 4x Daily   Perampanel 4 mg Oral Daily   rufinamide 1,600 mg Per G Tube BID   topiramate 250 mg Per G Tube BID   Valproic Acid 250 mg Per G Tube Q8H   vancomycin 15 mg/kg Intravenous Q12H       Continuous Infusions:   PRN Meds:  acetaminophen    bisacodyl    magnesium hydroxide    ondansetron    ondansetron     Physical Exam   HR:  [60-89] 77  Resp:  [16-20] 18  BP: ()/(47-66) 90/50  SpO2:  [95 %-98 %] 96 %    Neurologic Exam  Mental Status:  Sleeping  Awakes briefly when eyes are forced open and adjusts in bed, but does not keep eyes open  Does not follow commands or verbalize  Cranial Nerves:  Pupils equal  Blinks to threat from left and right  Turns head to the left and right  No nystagmus  No clear facial asymmetry  Motor: Tone is decreased in the uppers  Moves body to adjust in bed, but no other purposeful movements seen  DTRs: Not tested  Coordination: Not able to test      Test Results:  VEEG Results the last 24 hrs: Please see separate report  Impression and Plan:  Geoffrey Jarvis is a 40 y o  female with 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and severe intellectual disability   Seizures have remained intractable despite multiple concurrent medications (currently on 6 AEDs) and VNS      1  Intractable epilepsy with multiple seizure types, recent worsening of seizures in the setting of infection and including frequent seizures during recent EEG monitoring that primarily occur during sleep  Seizures improved some following transition from lamotrigine to levetiracetam and addition of perampanel on 10/13  -- Continue current levetiracetam, valproate, perampanel, rufinamide, topiramate  -- Decrease clobazam to 10 mg qhs starting tomorrow (oder entered)  -- Check levetiracetam, topiramate, valproate and rufinamide levels tomorrow morning    -- Consider additional EEG monitoring later this week to evaluate for interval change in subclinical/subtle seizures  2  Excessive sedation  Multifactorial  AEDs are likely contributing  Seizures increase significantly on EEG during sleep and therefore lessening sedation may improve overall seizure burden  -- Decrease clobazam as above  Consider tapering off completely in the future  Consider decrease in Rufinamide in the future  -- Check ammonia tomorrow morning    -- Attempt to orient to appropriate day/night schedule  She was awake from 2 am to 8 am today  3  Pneumonia   Treatment per primary team      Rufus Ng MD Date: 10/16/2017 Time: 11:25 AM

## 2017-10-16 NOTE — PROCEDURES
Continuous Video EEG Monitoring       Patient Name:  Fatmata Caba  MRN: [id number]   :  1980 File #: Nellie León 12-0   Age: 40 y o  Encounter #: [id number]   Date Performed: 10/15/2017  Date EEG Reviewed: 10/15/2017    Report date: 10/16/2017          Study type: Continuous video EEG    ICD 10 diagnosis: Generalized epilepsy intractable without status G40 319    Start time: 08 on 10/15 End time:  on 10/15     -------------------------------------------------------------------------------------------------------------------   Patient History:  40year old female with  Cole Chopra presents from nursing home with sepsis secondary to possible pneumonia  Patient reportedly has frequent daily tonic seizures as her baseline despite being on five anti-epilepsy drugs and having a vagus nerve stimulator  Caregivers state that seizures may have increased when the patient developed pneumonia  Medications include:     artificial tear 1 application Ophthalmic TID   cefepime 2,000 mg Intravenous Q12H   cloBAZam 10 mg Per NG Tube BID   enoxaparin 40 mg Subcutaneous Daily   lactulose 20 g Per PEG Tube BID   levETIRAcetam 1,000 mg Per G Tube Q12H [address]   levOCARNitine 500 mg Oral TID   multivitamin-minerals 1 tablet Per G Tube Daily   ofloxacin 1 drop Both Eyes 4x Daily   Perampanel 4 mg Oral Daily   rufinamide 1,600 mg Per G Tube BID   topiramate 250 mg Per G Tube BID   Valproic Acid 250 mg Per G Tube Q8H   vancomycin 15 mg/kg Intravenous Q12H       -------------------------------------------------------------------------------------------------------------------   Description of Procedure:  · 32 channel digital recording with electrodes placed according to the International 10-20 system with additional T1/T2 electrodes, EOG, EKG, and simultaneous video  A monitoring technologist supervised the continuous recording   The recording was technically satisfactory  -------------------------------------------------------------------------------------------------------------------   Results:   Background Activity:   The patient was asleep for almost the entire recording, achieving wakefulness only from 1346 through 1401  During wakefulness, background activity consists of continuous irregular, low voltage, posteriorly dominant, symmetric, reactive  theta activity with AP gradient absent       During periods of  light sleep,diffusely distributed rhythmic theta activity  During Stage 2 sleep, symmetric vertex sharp waves, K complexes and sleep spindles were present   Activation Procedures:   Neither hyperventilation nor photic stimulation was performed  Abnormal Findings:  See "Background Activity"  During sleep, frequent independent T3>T4 spikes were noted  Occasionally the spikes occur simultaneously at T3 and T4  Unlike the previous days' recording, during this recording, both T3 and T4 spikes occurred more frequently, and more often occurred in brief bursts of periodic spike activity  Also, whereas in previous recordings, T4 spikes occurred more frequently then T3 spikes, in this recording T3 spikes occurred significantly more frequently then those in T4  Other findings: The single lead EKG demonstrated a regular  rhythm  Events:   During sleep, the patient has  frequent electrographic seizures, with these four ictal EEG patterns:  1  An initial  1 second period of diffuse electrodecrement, followed by a 2-3 second period of diffuse low amplitude fast activity, followed by generalized 2 Hz spike wave discharge  The spike wave discharges last 3-8 seconds  This pattern is generally not not associated with any clinical manifestations other than, sometimes, rhythmic blinking in time with the spike wave discharges    2  The same as #1, except that the diffuse low amplitude fast activity lasts 2-4 seconds, accompanied by tonic posturing of the upper extremities, then evolve to 2-3 seconds of periodic bursts of low amplitude fast activity accompanied by clonic jerking of the upper extremities  These seizures occurred only 6-7 times during the 9 hour recording  3  Periods of diffuse low amplitude fast activity lasting 2-5 seconds that do not progress to spike- wave discharges  The diffuse low amplitude fast activity was often preceded by a left temporal lead-in lasting less than one second, consisting of 1- 5 T3 spikes  These seizures were not accompanied by clinical manifestations  4  Periods of 2 Hz generalized spike wave activity lasting up to 2 minutes which are not preceded by electrodecrement nor diffuse fast activity  The generally spike wave discharges were often preceded by a left temporal lead-in lasting less than one second, consisting of 1- 5 T3 spikes  NO clinical manifestations are noted with this ictal pattern, though responsiveness was not tested during them  In this recording, this was the most frequent seizure type  The patient did not have any seizures during the 15 minutes that she was awake   -------------------------------------------------------------------------------------------------------------------   Interpretation:   Markedly abnormal 9 hour continuous video-EEG recording, due to continuous background irregular theta slowing and frequent independent left greater than right midtemporal spikes and occasional simultaneous bitemporal spikes  Frequent tonic seizures, atypical absence seizures and brief tonic clonic seizures continued to occur during sleep, and the patient was asleep throughout the recording, achieving wakefulness for only about 15 minutes  The following notable changes in ictal and interictal patterns were noted when compared to the videoEEG monitoring performed over the previous 3 days:  1   Independent left and right midtemporal spikes have increased in frequency, and whereas right temporal spikes had been more frequent, in this recording, left temporal spikes are more frequent  2  Whereas brief tonic clonic seizures had been occurring at least every 2 minutes during sleep when recording first started on 10/12, they only occurred about 6 times in 9 hours during this study  3  With the decrease in frequency of the tonic clonic seizures and the tonic seizures, the most frequent seizure type during this recording is the "atypical absence" seizure (see #4 below), though even those are shorter than they had been  4  Nearly all of the seizures that consist of periods of 2 Hz generalized spike wave, as well as the seizures that consist of diffuse low amplitude fast activity are now preceded by a lead-in of a split second burst of left midtemporal spikes, raising the question of whether these "generalized seizures" in fact originate or are at least triggered by epileptic discharges in the left temporal lobe        Lucinda Julio MD   Medical Director  [address] [facility]  Pager # [phone]

## 2017-10-16 NOTE — PROCEDURES
Continuous Video EEG Monitoring       Patient Name:  Harish Suarez  MRN: [id number]   :  1980 File #: Suzan Casas    Age: 40 y o  Encounter #: [id number]   Date Performed: 10/14/2017 to 10/15/2017  Date EEG Reviewed: 10/15/2017    Report date: 10/16/2017          Study type: Continuous video EEG    ICD 10 diagnosis: Generalized epilepsy intractable without status G40 319    Start time: 08 on 10/14 End time: 08 on 10/15     -------------------------------------------------------------------------------------------------------------------   Patient History:  40year old female with  Barahona Kins presents from nursing home with sepsis secondary to possible pneumonia  Patient reportedly has frequent daily tonic seizures as her baseline despite being on five anti-epilepsy drugs and having a vagus nerve stimulator  Caregivers state that seizures may have increased when the patient developed pneumonia  Medications include:     artificial tear 1 application Ophthalmic TID   cefepime 2,000 mg Intravenous Q12H   cloBAZam 10 mg Per NG Tube BID   enoxaparin 40 mg Subcutaneous Daily   lactulose 20 g Per PEG Tube BID   levETIRAcetam 1,000 mg Per G Tube Q12H [address]   levOCARNitine 500 mg Oral TID   multivitamin-minerals 1 tablet Per G Tube Daily   ofloxacin 1 drop Both Eyes 4x Daily   Perampanel 4 mg Oral Daily   rufinamide 1,600 mg Per G Tube BID   topiramate 250 mg Per G Tube BID   Valproic Acid 250 mg Per G Tube Q8H   vancomycin 15 mg/kg Intravenous Q12H       -------------------------------------------------------------------------------------------------------------------   Description of Procedure:  · 32 channel digital recording with electrodes placed according to the International 10-20 system with additional T1/T2 electrodes, EOG, EKG, and simultaneous video  A monitoring technologist supervised the continuous recording   The recording was technically satisfactory  -------------------------------------------------------------------------------------------------------------------   Results:   Background Activity:   The patient was asleep for almost the entire recording, achieving wakefulness only from 1558 through 1711, ie just a little over an hour  During wakefulness, background activity consists of continuous irregular, low voltage, posteriorly dominant, symmetric, reactive  theta activity with AP gradient absent       During periods of  light sleep,diffusely distributed rhythmic theta activity  During Stage 2 sleep, symmetric vertex sharp waves, K complexes and sleep spindles were present   Activation Procedures:   Neither hyperventilation nor photic stimulation was performed  Abnormal Findings:  See "Background Activity"  During sleep, frequent bursts of 2 Hz generalized spike wave activity, intermixed with abnormalities more consistent with frequent electrographic seizures (see below)  Seizures occur so frequently that it is difficult to distinguish between brief bursts of generalized spike wave that are still part of an ictal discharge vs true interictal abnormalities  Frequent independent T4>T3 spikes were noted, sometimes occurring in brief periodic runs  Occasionally the spikes occur simultaneously at T3 and T4  Other findings: The single lead EKG demonstrated a regular  rhythm  Events:   During sleep, the patient has  frequent electrographic seizures, some of which are accompanied by tonic posturing of the proximal upper extremities bilaterally  Occasionally, the tonic posturing is followed by a few clonic jerks of the upper extremities  There are four ictal EEG patterns:  1  The most frequent EEG pattern consists of an initial  1 second period of diffuse electrodecrement, followed by a 2-3 second period of diffuse low amplitude fast activity, followed by generalized 2 Hz spike wave discharge   The spike wave discharges last 3-8 seconds  This pattern is generally not not associated with any clinical manifestations other than, sometimes, rhythmic blinking in time with the spike wave discharges  2  The same as #1, except that the diffuse low amplitude fast activity lasts 2-4 seconds, accompanied by tonic posturing of the upper extremities, then evolve to 2-3 seconds of periodic bursts of low amplitude fast activity accompanied by clonic jerking of the upper extremities  These brief tonic clonic seizures occur every  seconds  3  Periods of diffuse low amplitude fast activity lasting 2-5 seconds that do not progress to spike- wave discharges  4  Periods of 2 Hz generalized spike wave activity lasting up to 2 minutes which are not preceded by electrodecrement nor diffuse fast activity  NO clinical manifestations are noted with this ictal pattern, though responsiveness was not tested during them  The patient did not have any seizures during the 73 minutes that she was awake   -------------------------------------------------------------------------------------------------------------------   Interpretation:   Markedly abnormal 24 hour continuous video-EEG recording, due to continuous background irregular theta slowing, interictal abnormalities consisting of 2 Hz generalized spike wave, and frequent independent right greater than left midtemporal spikes and occasional simultaneous bitemporal spikes  Frequent tonic seizures, atypical absence seizures and brief tonic clonic seizures are noted during sleep  All three seizure types were significantly shorter during this recording then the seizures on the previous day's recording, and the intervals between seizures had increased    NO seizures occurred during the 73 minutes when the patient was awake        Cuba Al MD   Medical Director  [address] [facility]  Pager # [phone]

## 2017-10-16 NOTE — PROGRESS NOTES
[facility] Internal Medicine Progress Note  Patient: Rashawn Card 40 y o  female   MRN: [id number]  PCP: Ashleigh Cochran DO  Unit/Bed#: [facility] 715-01 Encounter: [id number]  Date Of Visit: 10/16/17    Assessment:    Principal Problem:    HCAP (healthcare-associated pneumonia)  Active Problems:    Sepsis ([facility] [address] )    Seizure ([facility] [address] )    Dysphagia    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[facility] [address] )      Plan:    · 1  HCAP- gram negative sepsis- on vancomycin and cefepime  · 2  H/o of seizure disorder- patient on vEEG  Neurology readjusting patient seizure medications  On fycompa, clobazam, keppra, ruinamide, depakote, and topamax  Awaiting further neurology recommendations  · 3  Dysphagia- has PEG  · 4   Lennox gastuat syndrome- on rufinamide  Neurology following  · 5  Hypotension- on IVF  · 6  Severe protein calorie malnutrition secondary to chronic illness- on PEG feeds  VTE Pharmacologic Prophylaxis:   Pharmacologic: Enoxaparin (Lovenox)  Mechanical VTE Prophylaxis in Place: Yes    Patient Centered Rounds: I have performed bedside rounds with nursing staff today  Discussions with Specialists or Other Care Team Provider:     Education and Discussions with Family / Patient:     Time Spent for Care: 20 minutes  More than 50% of total time spent on counseling and coordination of care as described above  Current Length of Stay: 6 day(s)    Current Patient Status: Inpatient   Certification Statement: The patient will continue to require additional inpatient hospital stay due to pneumonia, seizure    Discharge Plan / Estimated Discharge Date: once pneumonia resolves    Code Status: Level 1 - Full Code      Subjective:   Patient seen and examined at bedside  Patient nonverbal     Objective:     Vitals:   Temp (24hrs), Av 6 °F (36 4 °C), Min:97 4 °F (36 3 °C), Max:98 °F (36 7 °C)    HR:  [60-89] 77  Resp:  [16-20] 18  BP: ()/(47-66) 86/47  SpO2:  [95 %-98 %] 96 %  Body mass index is 17 56 kg/m²       Input and Output Summary (last 24 hours): Intake/Output Summary (Last 24 hours) at 10/16/17 1119  Last data filed at 10/16/17 0947   Gross per 24 hour   Intake                0 ml   Output                0 ml   Net                0 ml       Physical Exam:     Physical Exam   Constitutional: She appears well-developed and well-nourished  HENT:   Head: Normocephalic and atraumatic  Eyes: Conjunctivae and EOM are normal  Pupils are equal, round, and reactive to light  Neck: Normal range of motion  Neck supple  No JVD present  No tracheal deviation present  No thyromegaly present  Cardiovascular: Normal rate, regular rhythm and normal heart sounds  Exam reveals no gallop and no friction rub  No murmur heard  Pulmonary/Chest: Effort normal and breath sounds normal  No respiratory distress  She has no wheezes  She has no rales  Abdominal: Soft  Bowel sounds are normal  She exhibits no distension  There is no tenderness  There is no rebound  +PEG   Musculoskeletal:   contracted   Neurological: She is alert  Vitals reviewed  Additional Data:     Labs:      Results from last 7 days  Lab Units 10/14/17  0612   WBC Thousand/uL 6 45   HEMOGLOBIN g/dL 11 2*   HEMATOCRIT % 34 0*   PLATELETS Thousands/uL 173   NEUTROS PCT % 36*   LYMPHS PCT % 45*   MONOS PCT % 12   EOS PCT % 7*       Results from last 7 days  Lab Units 10/14/17  0612  10/10/17  0423   SODIUM mmol/L 142  < > 142   POTASSIUM mmol/L 4 1  < > 3 7   CHLORIDE mmol/L 110*  < > 107   CO2 mmol/L 25  < > 20*   BUN mg/dL 11  < > 16   CREATININE mg/dL 0 36*  < > 0 60   CALCIUM mg/dL 8 7  < > 8 5   TOTAL PROTEIN g/dL  --   --  6 8   BILIRUBIN TOTAL mg/dL  --   --  0 20   ALK PHOS U/L  --   --  93   ALT U/L  --   --  34   AST U/L  --   --  22   GLUCOSE RANDOM mg/dL 102  < > 141*   < > = values in this interval not displayed  Results from last 7 days  Lab Units 10/10/17  0423   INR  1 05       * I Have Reviewed All Lab Data Listed Above    * Additional Pertinent Lab Tests Reviewed: [address] Admission Reviewed    Imaging:    Imaging Reports Reviewed Today Include:   Imaging Personally Reviewed by Myself Includes:      Recent Cultures (last 7 days):       Results from last 7 days  Lab Units 10/10/17  0423   BLOOD CULTURE  No Growth After 5 Days  No Growth After 5 Days  Last 24 Hours Medication List:     artificial tear 1 application Ophthalmic TID   cefepime 2,000 mg Intravenous Q12H   cloBAZam 10 mg Per NG Tube BID   enoxaparin 40 mg Subcutaneous Daily   lactulose 20 g Per PEG Tube BID   levETIRAcetam 1,000 mg Per G Tube Q12H [address]   levOCARNitine 500 mg Oral TID   multivitamin-minerals 1 tablet Per G Tube Daily   ofloxacin 1 drop Both Eyes 4x Daily   Perampanel 4 mg Oral Daily   rufinamide 1,600 mg Per G Tube BID   topiramate 250 mg Per G Tube BID   Valproic Acid 250 mg Per G Tube Q8H   vancomycin 15 mg/kg Intravenous Q12H        Today, Patient Was Seen By: Rosales Montanez MD    ** Please Note: This note has been constructed using a voice recognition system   **

## 2017-10-16 NOTE — PROCEDURES
Continuous Video EEG Monitoring       Patient Name:  Alberto Squires  MRN: [id number]   :  1980 File #: Birder Sat    Age: 40 y o  Encounter #: [id number]   Date Performed: 10/13/2017 to 10/14/2017  Date EEG Reviewed: 10/14/2017    Report date: 10/15/2017          Study type: Continuous video EEG    ICD 10 diagnosis: Generalized epilepsy intractable without status G40 [id number]    Start time: 08 on 10/13 End time: 08 on 10/14     -------------------------------------------------------------------------------------------------------------------   Patient History:  40year old female with  Ban Bleacher presents from nursing home with sepsis secondary to possible pneumonia  Patient reportedly has frequent daily tonic seizures as her baseline despite being on five anti-epilepsy drugs and having a vagus nerve stimulator  Caregivers state that seizures may have increased when the patient developed pneumonia  Medications include:   artificial tear 1 application Ophthalmic TID   cefepime 2,000 mg Intravenous Q12H   cloBAZam 10 mg Per NG Tube BID   enoxaparin 40 mg Subcutaneous Daily   lactulose 20 g Per PEG Tube BID   levETIRAcetam 1,000 mg Per G Tube Q12H [address]   levOCARNitine 500 mg Oral TID   multivitamin-minerals 1 tablet Per G Tube Daily   ofloxacin 1 drop Both Eyes 4x Daily   Perampanel 4 mg Oral Daily   rufinamide 1,600 mg Per G Tube BID   topiramate 250 mg Per G Tube BID   Valproic Acid 250 mg Per G Tube Q8H   vancomycin 15 mg/kg Intravenous Q12H       -------------------------------------------------------------------------------------------------------------------   Description of Procedure:  · 32 channel digital recording with electrodes placed according to the International 10-20 system with additional T1/T2 electrodes, EOG, EKG, and simultaneous video  A monitoring technologist supervised the continuous recording   The recording was technically satisfactory  -------------------------------------------------------------------------------------------------------------------   Results:   Background Activity:   The patient was asleep for nearly the entire recording  During periods when the patient appeared awake ( may have been drowsy), background activity consists of continuous irregular, low voltage, posteriorly dominant, symmetric, reactive  theta activity with AP gradient absent       During periods of  light sleep,diffusely distributed rhythmic theta activity  During Stage 2 sleep, symmetric vertex sharp waves, K complexes and sleep spindles were present   Activation Procedures:   Neither hyperventilation nor photic stimulation was performed  Abnormal Findings:  See "Background Activity"  During sleep, frequent bursts of 2 Hz generalized spike wave activity, intermixed with abnormalities more consistent with frequent electrographic seizures (see below)  Seizures occur so frequently that it is difficult to distinguish between brief bursts of generalized spike wave that are still part of an ictal discharge vs true interictal abnormalities  At 06:53:39, the patient starts having frequent independent T4>T3 spikes  Occasionally the spikes occur simultaneously at T3 and T4  Other findings: The single lead EKG demonstrated a regular  rhythm  Events:   During sleep, the patient has very frequent nearly continuous electrographic seizures, some of which are accompanied by tonic posturing of the proximal upper extremities bilaterally  Occasionally, the tonic posturing is followed by a few clonic jerks of the upper extremities  There are four ictal EEG patterns:  1  The most frequent EEG pattern consists of an initial  1-3 second period of diffuse electrodecrement, followed by a 2-7 second period of diffuse low amplitude fast activity, followed by generalized 2 Hz spike wave discharge  The spike wave discharges last 3-5 minutes   This pattern is generally not not associated with any clinical manifestations other than, sometimes, rhythmic blinking in time with the spike wave discharges  2  The same as #1, except that the diffuse low amplitude fast activity can last up to 15 seconds, accompanied by tonic posturing of the upper extremities, then evolve to 2-4 seconds of periodic bursts of low amplitude fast activity accompanied by clonic jerking of the upper extremities  3  Periods of diffuse low amplitude fast activity lasting 2-5 seconds that do not progress to spike- wave discharges  4  Periods of 2 Hz generalized spike wave activity lastind up to 2-3 minutes which are not preceded by electrodecrement nor diffuse fast activity  NO clinical manifestations are noted with this ictal pattern, though responsiveness was not tested during them  Types 1 and 2 are noted occasionally during wakefulness  -------------------------------------------------------------------------------------------------------------------   Interpretation:   Markedly abnormal 22 hour continuous video-EEG recording, due to continuous background irregular theta slowing, interictal abnormalities consisting of 2 Hz generalized spike wave, and after 0653, frequent independent right greater than left midtemporal spikes and occasional simultaneous bitemporal spikes  Frequent, nearly continuous tonic seziures, atypical absence seizures and brief tonic clonic seizures are noted during sleep  Occasionally, the tonic seizures and less frequently, the tonic clonic seizures occur during wakefulness        Blake Knowles MD   Medical Director  [address] [facility]  Pager # [phone]

## 2017-10-17 LAB
AMMONIA PLAS-SCNC: 90 UMOL/L (ref 11–35)
ANION GAP SERPL CALCULATED.3IONS-SCNC: 6 MMOL/L (ref 4–13)
BASOPHILS # BLD AUTO: 0.06 THOUSANDS/ΜL (ref 0–0.1)
BASOPHILS NFR BLD AUTO: 1 % (ref 0–1)
BUN SERPL-MCNC: 16 MG/DL (ref 5–25)
CALCIUM SERPL-MCNC: 9.2 MG/DL (ref 8.3–10.1)
CHLORIDE SERPL-SCNC: 107 MMOL/L (ref 100–108)
CO2 SERPL-SCNC: 26 MMOL/L (ref 21–32)
CREAT SERPL-MCNC: 0.33 MG/DL (ref 0.6–1.3)
EOSINOPHIL # BLD AUTO: 0.34 THOUSAND/ΜL (ref 0–0.61)
EOSINOPHIL NFR BLD AUTO: 4 % (ref 0–6)
ERYTHROCYTE [DISTWIDTH] IN BLOOD BY AUTOMATED COUNT: 14 % (ref 11.6–15.1)
GFR SERPL CREATININE-BSD FRML MDRD: 143 ML/MIN/1.73SQ M
GLUCOSE SERPL-MCNC: 111 MG/DL (ref 65–140)
HCT VFR BLD AUTO: 37.2 % (ref 34.8–46.1)
HGB BLD-MCNC: 12.3 G/DL (ref 11.5–15.4)
LYMPHOCYTES # BLD AUTO: 3.12 THOUSANDS/ΜL (ref 0.6–4.47)
LYMPHOCYTES NFR BLD AUTO: 35 % (ref 14–44)
MCH RBC QN AUTO: 32.9 PG (ref 26.8–34.3)
MCHC RBC AUTO-ENTMCNC: 33.1 G/DL (ref 31.4–37.4)
MCV RBC AUTO: 100 FL (ref 82–98)
MONOCYTES # BLD AUTO: 1.07 THOUSAND/ΜL (ref 0.17–1.22)
MONOCYTES NFR BLD AUTO: 12 % (ref 4–12)
NEUTROPHILS # BLD AUTO: 4.31 THOUSANDS/ΜL (ref 1.85–7.62)
NEUTS SEG NFR BLD AUTO: 48 % (ref 43–75)
NRBC BLD AUTO-RTO: 0 /100 WBCS
PLATELET # BLD AUTO: 257 THOUSANDS/UL (ref 149–390)
PMV BLD AUTO: 11.3 FL (ref 8.9–12.7)
POTASSIUM SERPL-SCNC: 4 MMOL/L (ref 3.5–5.3)
RBC # BLD AUTO: 3.74 MILLION/UL (ref 3.81–5.12)
SODIUM SERPL-SCNC: 139 MMOL/L (ref 136–145)
VALPROATE SERPL-MCNC: 66 UG/ML (ref 50–100)
VANCOMYCIN SERPL-MCNC: 11.1 UG/ML
WBC # BLD AUTO: 8.94 THOUSAND/UL (ref 4.31–10.16)

## 2017-10-17 PROCEDURE — 80177 DRUG SCRN QUAN LEVETIRACETAM: CPT | Performed by: PSYCHIATRY & NEUROLOGY

## 2017-10-17 PROCEDURE — 80048 BASIC METABOLIC PNL TOTAL CA: CPT | Performed by: INTERNAL MEDICINE

## 2017-10-17 PROCEDURE — 80201 ASSAY OF TOPIRAMATE: CPT | Performed by: PSYCHIATRY & NEUROLOGY

## 2017-10-17 PROCEDURE — 80053 COMPREHEN METABOLIC PANEL: CPT | Performed by: INTERNAL MEDICINE

## 2017-10-17 PROCEDURE — 80164 ASSAY DIPROPYLACETIC ACD TOT: CPT | Performed by: PSYCHIATRY & NEUROLOGY

## 2017-10-17 PROCEDURE — 80202 ASSAY OF VANCOMYCIN: CPT | Performed by: INTERNAL MEDICINE

## 2017-10-17 PROCEDURE — 82140 ASSAY OF AMMONIA: CPT | Performed by: PSYCHIATRY & NEUROLOGY

## 2017-10-17 PROCEDURE — 80339 ANTIEPILEPTICS NOS 1-3: CPT | Performed by: PSYCHIATRY & NEUROLOGY

## 2017-10-17 PROCEDURE — 94760 N-INVAS EAR/PLS OXIMETRY 1: CPT

## 2017-10-17 PROCEDURE — 94762 N-INVAS EAR/PLS OXIMTRY CONT: CPT

## 2017-10-17 PROCEDURE — 85025 COMPLETE CBC W/AUTO DIFF WBC: CPT | Performed by: INTERNAL MEDICINE

## 2017-10-17 RX ORDER — LACTULOSE 20 G/30ML
20 SOLUTION ORAL 3 TIMES DAILY
Status: DISCONTINUED | OUTPATIENT
Start: 2017-10-17 | End: 2017-10-18

## 2017-10-17 RX ORDER — VANCOMYCIN HYDROCHLORIDE 1 G/200ML
1000 INJECTION, SOLUTION INTRAVENOUS EVERY 12 HOURS
Status: COMPLETED | OUTPATIENT
Start: 2017-10-17 | End: 2017-10-18

## 2017-10-17 RX ADMIN — LEVOCARNITINE 500 MG: 1 SOLUTION ORAL at 16:32

## 2017-10-17 RX ADMIN — PERAMPANEL 4 MG: 4 TABLET ORAL at 09:39

## 2017-10-17 RX ADMIN — MINERAL OIL AND WHITE PETROLATUM 1 APPLICATION: 150; 830 OINTMENT OPHTHALMIC at 09:26

## 2017-10-17 RX ADMIN — LEVOCARNITINE 500 MG: 1 SOLUTION ORAL at 09:25

## 2017-10-17 RX ADMIN — Medication 1 TABLET: at 09:36

## 2017-10-17 RX ADMIN — VALPROIC ACID 250 MG: 250 SOLUTION ORAL at 11:33

## 2017-10-17 RX ADMIN — VALPROIC ACID 250 MG: 250 SOLUTION ORAL at 04:24

## 2017-10-17 RX ADMIN — OFLOXACIN 1 DROP: 3 SOLUTION OPHTHALMIC at 09:25

## 2017-10-17 RX ADMIN — MINERAL OIL AND WHITE PETROLATUM 1 APPLICATION: 150; 830 OINTMENT OPHTHALMIC at 23:11

## 2017-10-17 RX ADMIN — ENOXAPARIN SODIUM 40 MG: 40 INJECTION SUBCUTANEOUS at 09:36

## 2017-10-17 RX ADMIN — LACTULOSE 20 G: 20 SOLUTION ORAL at 23:18

## 2017-10-17 RX ADMIN — VALPROIC ACID 250 MG: 250 SOLUTION ORAL at 18:26

## 2017-10-17 RX ADMIN — MINERAL OIL AND WHITE PETROLATUM 1 APPLICATION: 150; 830 OINTMENT OPHTHALMIC at 17:38

## 2017-10-17 RX ADMIN — LEVETIRACETAM 1000 MG: 100 SOLUTION ORAL at 09:25

## 2017-10-17 RX ADMIN — LACTULOSE 20 G: 20 SOLUTION ORAL at 16:32

## 2017-10-17 RX ADMIN — TOPIRAMATE 250 MG: 100 TABLET, FILM COATED ORAL at 09:36

## 2017-10-17 RX ADMIN — RUFINAMIDE 1600 MG: 200 TABLET, FILM COATED ORAL at 17:38

## 2017-10-17 RX ADMIN — CEFEPIME HYDROCHLORIDE 2000 MG: 2 INJECTION, POWDER, FOR SOLUTION INTRAVENOUS at 11:34

## 2017-10-17 RX ADMIN — TOPIRAMATE 250 MG: 100 TABLET, FILM COATED ORAL at 17:37

## 2017-10-17 RX ADMIN — VANCOMYCIN HYDROCHLORIDE 750 MG: 750 INJECTION, SOLUTION INTRAVENOUS at 00:55

## 2017-10-17 RX ADMIN — LEVETIRACETAM 1000 MG: 100 SOLUTION ORAL at 23:11

## 2017-10-17 RX ADMIN — LACTULOSE 20 G: 20 SOLUTION ORAL at 09:36

## 2017-10-17 RX ADMIN — LEVOCARNITINE 500 MG: 1 SOLUTION ORAL at 23:12

## 2017-10-17 RX ADMIN — RUFINAMIDE 1600 MG: 200 TABLET, FILM COATED ORAL at 09:26

## 2017-10-17 RX ADMIN — VANCOMYCIN HYDROCHLORIDE 1000 MG: 750 INJECTION, SOLUTION INTRAVENOUS at 12:40

## 2017-10-17 RX ADMIN — OFLOXACIN 1 DROP: 3 SOLUTION OPHTHALMIC at 23:10

## 2017-10-17 RX ADMIN — CEFEPIME HYDROCHLORIDE 2000 MG: 2 INJECTION, POWDER, FOR SOLUTION INTRAVENOUS at 23:19

## 2017-10-17 RX ADMIN — CLOBAZAM 10 MG: 10 TABLET ORAL at 23:18

## 2017-10-17 RX ADMIN — OFLOXACIN 1 DROP: 3 SOLUTION OPHTHALMIC at 17:38

## 2017-10-17 NOTE — PROGRESS NOTES
[facility] Internal Medicine Progress Note  Patient: Rashawn Card 40 y o  female   MRN: [id number]  PCP: Ashleigh Cochran DO  [facility] Encounter: [id number]  Date Of Visit: 10/17/17    Assessment/Plan:  HCAP, suspected G -ve and positive and resistant organism  Sepsis 2/2 # 1, resolved  - patient to complete vanco and cefepime today  - BC are NTD  - sputum cx was not obtained  - no clear organism isolated, although MRSA nose negative  - monitor    Refractory seizure with abnormal vEEG  - patient on multiple medications   - drugs managed by neurology----> taper clobazam at 10 mg HS and eventually d/c, also as OP might consider d/c rufinamide in the future  - patient on depakote and ammonia level 90 today from BL of 12 on 7/17-----> lactulose increased, will defer to epileptologist medications adjustment if indicared, c/w carnitine as well, ammonia and CMP in am    Lennox Gastuat Syndrome  - rufinamide as above  - other supportive care    Dysphagia  - NPO with PEG feeding    Severe protein and calorie malnutrition  - 2/2 chronic illness  - PEG feeding as done so far    VTE Pharmacologic Prophylaxis: yes  Pharmacologic: Enoxaparin (Lovenox)  Mechanical VTE Prophylaxis in Place: Yes     Patient Centered Rounds: I have performed bedside rounds with nursing staff today      Discussions with Specialists or Other Care Team Provider: neurology     Education and Discussions with Family / Patient: left a message for mother Esme Dan     Time Spent for Care: 20 minutes    More than 50% of total time spent on counseling and coordination of care as described above      Current Length of Stay: 7 day(s)     Current Patient Status: Inpatient   Certification Statement: The patient will continue to require additional inpatient hospital stay due to please refer to above plan     Discharge Plan / Estimated Discharge Date: back to [facility] when neurologically stable     Code Status: Level 1 - Full Code    Subjective:   Unable to Medication List:     artificial tear 1 application Ophthalmic TID   cefepime 2,000 mg Intravenous Q12H   cloBAZam 10 mg Per NG Tube HS   enoxaparin 40 mg Subcutaneous Daily   lactulose 20 g Per PEG Tube TID   levETIRAcetam 1,000 mg Per G Tube Q12H [address]   levOCARNitine 500 mg Per PEG Tube TID   multivitamin-minerals 1 tablet Per G Tube Daily   ofloxacin 1 drop Both Eyes 4x Daily   Perampanel 4 mg Oral Daily   rufinamide 1,600 mg Per G Tube BID   topiramate 250 mg Per G Tube BID   Valproic Acid 250 mg Per G Tube Q8H   vancomycin 1,000 mg Intravenous Q12H        Today, Patient Was Seen By: Marzena Malloy MD    ** Please Note: Dragon 360 Dictation voice to text software may have been used in the creation of this document   **

## 2017-10-17 NOTE — NUTRITION
10/16/17 1931   Recommendations/Interventions   Nutrition Recommendations Tube Feeding Recommendation provided  (Suggest decreasing tf to 55ml/hr providing 1584kcal wit 73gm pro Add  130ml free h20 flushes Q6hrs providing 1589ml total fluids   to prevent overfeeding )

## 2017-10-17 NOTE — CASE MANAGEMENT
Continued Stay Review    Date: 10/14/2017    Vital Signs: /64   Pulse 66   Temp (!) 97 3 °F (36 3 °C) (Axillary)   Resp 18   Ht 5' 2" (1 575 m) Comment: noted per prior adm hx in [employer]  Wt 49 1 kg (108 lb 3 9 oz) Comment: with blue pads  LMP  (LMP Unknown)   SpO2 99%   BMI 19 80 kg/m²     Medications:   Scheduled Meds:   artificial tear 1 application Ophthalmic TID   cefepime 2,000 mg Intravenous Q12H   cloBAZam 10 mg Per NG Tube HS   enoxaparin 40 mg Subcutaneous Daily   lactulose 20 g Per PEG Tube TID   levETIRAcetam 1,000 mg Per G Tube Q12H [facility]   levOCARNitine 500 mg Per PEG Tube TID   multivitamin-minerals 1 tablet Per G Tube Daily   ofloxacin 1 drop Both Eyes 4x Daily   Perampanel 4 mg Oral Daily   rufinamide 1,600 mg Per G Tube BID   topiramate 250 mg Per G Tube BID   Valproic Acid 250 mg Per G Tube Q8H   vancomycin 1,000 mg Intravenous Q12H     Continuous Infusions:    PRN Meds:   acetaminophen    bisacodyl    magnesium hydroxide    ondansetron    Abnormal Labs/Diagnostic Results:   Lab Units 10/14/17  0612   WBC Thousand/uL 6 45   HEMOGLOBIN g/dL 11 2*   HEMATOCRIT % 34 0*   PLATELETS Thousands/uL 173   NEUTROS PCT % 36*   LYMPHS PCT % 45*   MONOS PCT % 12   EOS PCT % 7       Age/Sex: 40 y o  female     Assessment/Plan:    Assessment:     Principal Problem:    HCAP (healthcare-associated pneumonia)  Active Problems:    Sepsis ([facility] [address] )    Seizure ([facility] [address] )    Dysphagia    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[facility] [address] )        Plan:     · 1  HCAP- gram negative sepsis- on vancomycin and cefepime  · 2  H/o of seizure disorder- patient on vEEG  Neurology readjusting patient seizure medications  · 3  Dysphagia- has PEG  · 4   Lennox gastuat syndrome- on rufinamide  Neurology following  · 5  Hypotension- on IVF  · 6    Severe protein calorie malnutrition secondary to chronic illness- on PEG feeds      Current Length of Stay: 4 day(s)     Current Patient Status: Inpatient   Certification Statement: The patient will continue to require additional inpatient hospital stay due to pneumonia, seizure     Discharge Plan / Estimated Discharge Date: once pneumonia resolves

## 2017-10-18 ENCOUNTER — APPOINTMENT (INPATIENT)
Dept: NEUROLOGY | Facility: AMBULATORY SURGERY CENTER | Age: 37
DRG: 871 | End: 2017-10-18
Payer: MEDICARE

## 2017-10-18 LAB
AMMONIA PLAS-SCNC: 76 UMOL/L (ref 11–35)
TOPIRAMATE SERPL-MCNC: 5 UG/ML (ref 2–25)

## 2017-10-18 PROCEDURE — 94762 N-INVAS EAR/PLS OXIMTRY CONT: CPT

## 2017-10-18 PROCEDURE — 82140 ASSAY OF AMMONIA: CPT | Performed by: INTERNAL MEDICINE

## 2017-10-18 PROCEDURE — 95951 HB EEG MONITORING/VIDEORECORD: CPT

## 2017-10-18 RX ORDER — LANOLIN ALCOHOL/MO/W.PET/CERES
6 CREAM (GRAM) TOPICAL
Status: DISCONTINUED | OUTPATIENT
Start: 2017-10-18 | End: 2017-10-26 | Stop reason: HOSPADM

## 2017-10-18 RX ORDER — VANCOMYCIN HYDROCHLORIDE 1 G/200ML
1000 INJECTION, SOLUTION INTRAVENOUS EVERY 12 HOURS
Status: DISCONTINUED | OUTPATIENT
Start: 2017-10-18 | End: 2017-10-18

## 2017-10-18 RX ORDER — ACETAMINOPHEN 160 MG/5ML
650 SUSPENSION, ORAL (FINAL DOSE FORM) ORAL EVERY 4 HOURS PRN
Status: DISCONTINUED | OUTPATIENT
Start: 2017-10-18 | End: 2017-10-26 | Stop reason: HOSPADM

## 2017-10-18 RX ORDER — LACTULOSE 20 G/30ML
30 SOLUTION ORAL 3 TIMES DAILY
Status: DISCONTINUED | OUTPATIENT
Start: 2017-10-18 | End: 2017-10-19

## 2017-10-18 RX ADMIN — LEVETIRACETAM 1000 MG: 100 SOLUTION ORAL at 22:41

## 2017-10-18 RX ADMIN — Medication 1 TABLET: at 09:02

## 2017-10-18 RX ADMIN — MINERAL OIL AND WHITE PETROLATUM 1 APPLICATION: 150; 830 OINTMENT OPHTHALMIC at 22:40

## 2017-10-18 RX ADMIN — LACTULOSE 30 G: 20 SOLUTION ORAL at 22:43

## 2017-10-18 RX ADMIN — LEVOCARNITINE 500 MG: 1 SOLUTION ORAL at 18:22

## 2017-10-18 RX ADMIN — VANCOMYCIN HYDROCHLORIDE 1000 MG: 750 INJECTION, SOLUTION INTRAVENOUS at 00:38

## 2017-10-18 RX ADMIN — RUFINAMIDE 1600 MG: 200 TABLET, FILM COATED ORAL at 18:22

## 2017-10-18 RX ADMIN — MELATONIN TAB 3 MG 6 MG: 3 TAB at 22:44

## 2017-10-18 RX ADMIN — LEVOCARNITINE 500 MG: 1 SOLUTION ORAL at 09:02

## 2017-10-18 RX ADMIN — OFLOXACIN 1 DROP: 3 SOLUTION OPHTHALMIC at 18:19

## 2017-10-18 RX ADMIN — MINERAL OIL AND WHITE PETROLATUM 1 APPLICATION: 150; 830 OINTMENT OPHTHALMIC at 12:33

## 2017-10-18 RX ADMIN — TOPIRAMATE 250 MG: 100 TABLET, FILM COATED ORAL at 18:20

## 2017-10-18 RX ADMIN — LEVOCARNITINE 500 MG: 1 SOLUTION ORAL at 22:41

## 2017-10-18 RX ADMIN — PERAMPANEL 4 MG: 4 TABLET ORAL at 09:14

## 2017-10-18 RX ADMIN — MINERAL OIL AND WHITE PETROLATUM 1 APPLICATION: 150; 830 OINTMENT OPHTHALMIC at 18:19

## 2017-10-18 RX ADMIN — VALPROIC ACID 250 MG: 250 SOLUTION ORAL at 12:32

## 2017-10-18 RX ADMIN — VALPROIC ACID 250 MG: 250 SOLUTION ORAL at 03:37

## 2017-10-18 RX ADMIN — LACTULOSE 30 G: 20 SOLUTION ORAL at 18:20

## 2017-10-18 RX ADMIN — ACETAMINOPHEN 650 MG: 160 SUSPENSION ORAL at 01:39

## 2017-10-18 RX ADMIN — VALPROIC ACID 250 MG: 250 SOLUTION ORAL at 18:19

## 2017-10-18 RX ADMIN — RUFINAMIDE 1600 MG: 200 TABLET, FILM COATED ORAL at 09:02

## 2017-10-18 RX ADMIN — LEVETIRACETAM 1000 MG: 100 SOLUTION ORAL at 09:02

## 2017-10-18 RX ADMIN — OFLOXACIN 1 DROP: 3 SOLUTION OPHTHALMIC at 12:33

## 2017-10-18 RX ADMIN — ONDANSETRON 4 MG: 2 INJECTION INTRAMUSCULAR; INTRAVENOUS at 01:56

## 2017-10-18 RX ADMIN — LACTULOSE 20 G: 20 SOLUTION ORAL at 09:01

## 2017-10-18 RX ADMIN — OFLOXACIN 1 DROP: 3 SOLUTION OPHTHALMIC at 09:01

## 2017-10-18 RX ADMIN — OFLOXACIN 1 DROP: 3 SOLUTION OPHTHALMIC at 22:40

## 2017-10-18 RX ADMIN — ENOXAPARIN SODIUM 40 MG: 40 INJECTION SUBCUTANEOUS at 09:01

## 2017-10-18 RX ADMIN — TOPIRAMATE 250 MG: 100 TABLET, FILM COATED ORAL at 09:01

## 2017-10-18 NOTE — PROGRESS NOTES
Epilepsy Consult Follow-up Note  Patient Name: Berenice Pichardo  MRN: [id number]  Date: 10/18/17 Time: 1:52 PM     LOS: 8 days     Interval History:  · EEG monitoring discontinued at 21:00 on 10/15  · No clinical events concerning for seizure per nurse and one-to-one staff  · Clobazam decreased to 10 mg qhs from 10 mg bid on 10/17  · Has been awake all night and sleeping during the day per staff for the last 3 days (awake from early AM to 8 AM or so)  · I spoke with staff at her facility on 10/17  At baseline she takes a few ~2 hour naps during the day and is awake for a few hours at a time overnight  Usually she wakes up for a time when aroused from a nap (in contrast to my exams)  · Ammonia 12 on 7/6/17, 90 on 10/17/17, 76 on 10/18/17    ROS: Not able to perform  Scheduled Meds:    artificial tear 1 application Ophthalmic TID   enoxaparin 40 mg Subcutaneous Daily   lactulose 30 g Per PEG Tube TID   levETIRAcetam 1,000 mg Per G Tube Q12H [address]   levOCARNitine 500 mg Per PEG Tube TID   melatonin 6 mg Oral HS   multivitamin-minerals 1 tablet Per G Tube Daily   ofloxacin 1 drop Both Eyes 4x Daily   Perampanel 4 mg Oral Daily   rufinamide 1,600 mg Per G Tube BID   topiramate 250 mg Per G Tube BID   Valproic Acid 250 mg Per G Tube Q8H     Physical Exam   HR:  [65-95] 65  Resp:  [16-20] 16  BP: ()/(52-62) 90/60  SpO2:  [96 %-100 %] 97 %    Neurologic Exam  Mental Status:  Sleeping  Awakes briefly to vigorous tactile stimulation or when eyes are forced open, but does not keep eyes open for long  Does not follow commands or verbalize  Cranial Nerves:  Pupils equal  No clear facial asymmetry  Motor: Tone is decreased in the uppers  Localizes with legs and LUE  No purposeful movement of LUE seen (did grab things with right arm during breakfast per staff)   Coordination: Not able to test      Test Results:  VEEG Results the last 24 hrs: Please see separate report        Model Number: 105   Generator Serial Number: [id number]  Date Implanted: 11/3/2015  Date Performed: 10/18/2017    The patient's vagus nerve stimulator was interrogated and the following information was obtained:  Output Current:  2 00 mA  Frequency: 30 Hz  Pulse Width: 500 µs  Signal On time: 21 sec  Signal Off time: 0 8 min  Magnet Output: 2 25  mAmps  Magnet Pulse Width: 500 µs  Magnet on Time: 60  sec    Systems Diagnostics  Output Current: OK  Lead Impedance (okay/high/low): OK  IFI: No      Impression and Plan:  Josef Parmar is a 40 y o  female with 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and severe intellectual disability  Seizures have remained intractable despite multiple concurrent medications (currently on 6 AEDs) and VNS  1  Intractable epilepsy with multiple seizure types, recent worsening of seizures in the setting of infection and including frequent seizures during recent EEG monitoring that primarily occur during sleep  Seizures improved some following transition from lamotrigine to levetiracetam and addition of perampanel on 10/13    -- Stop clobazam  (order entered)  -- Continue current levetiracetam, valproate, perampanel, rufinamide, topiramate  -- Follow up levetiracetam, topiramate and rufinamide levels drawn the morning of 10/17    -- Start VEEG monitoring this afternoon through at least tomorrow morning to evaluate for interval change in subclinical/subtle seizures  2  Excessive sedation  Multifactorial  Ammonia significantly increased from baseline  Recent/current infection contributing  There is also day/night reversal  AEDs are likely contributing  Seizures increase significantly on EEG during sleep and therefore lessening sedation may improve overall seizure burden  -- Clobazam decreased on 10/17 and stopped 10/18  Consider decrease in Rufinamide in the future  -- Monitor for improvement in ammonia  -- Attempt to orient to appropriate day/night schedule  Stimulate during the day  She was awake from ~2 am to 9 am today     -- Start melatonin  (order entered)  -- Agree with increased lactulose for elevated ammonia  Continue levocarnitine  3  Pneumonia  Treatment per primary team  Completing Abx today       Paulo Austin MD Date: 10/18/2017 Time: 2:12 PM

## 2017-10-18 NOTE — CASE MANAGEMENT
Continued Stay Review    Date: 10-18-17      Vital Signs: BP 90/60   Pulse 65   Temp 97 9 °F (36 6 °C) (Axillary)   Resp 16   Ht 5' 2" (1 575 m) Comment: noted per prior adm hx in [employer]  Wt 49 1 kg (108 lb 3 9 oz) Comment: with blue pads  LMP  (LMP Unknown)   SpO2 97%   BMI 19 80 kg/m²     Medications:   Scheduled Meds:   artificial tear 1 application Ophthalmic TID   enoxaparin 40 mg Subcutaneous Daily   lactulose 30 g Per PEG Tube TID   levETIRAcetam 1,000 mg Per G Tube Q12H [address]   levOCARNitine 500 mg Per PEG Tube TID   melatonin 6 mg Oral HS   multivitamin-minerals 1 tablet Per G Tube Daily   ofloxacin 1 drop Both Eyes 4x Daily   Perampanel 4 mg Oral Daily   rufinamide 1,600 mg Per G Tube BID   topiramate 250 mg Per G Tube BID   Valproic Acid 250 mg Per G Tube Q8H     Continuous Infusions:    PRN Meds:   acetaminophen    bisacodyl    magnesium hydroxide    ondansetron    ondansetron    Abnormal Labs/Diagnostic Results:  Age/Sex: 40 y o  female     Assessment/Plan:   Refractory seizure with abnormal vEEG  - patient on multiple medications   - drugs managed by neurology----> taper clobazam at 10 mg HS and eventually d/c, also as OP might consider d/c rufinamide in the future  - pending levels for keppra, topiramate and rifunimade  - ammonia 90----> 76, repeat in am and increase lactulose more from 20 mg TID to 30 mg TID, to be continued on carnitine  - no further medications changes at this time  - no more clear seizure activity identified  - downgrade care from level 2 stepdown to med/surg  - possible repeat vEEG later this week-----> awaiting final neuro plan     HCAP, suspected G -ve and positive and resistant organism  Sepsis 2/2 # 1, resolved  - patient completed vanco and cefepime   - BC are NTD  - sputum cx was not obtained  - no clear organism isolated, although MRSA nose negative  - stable so far, will monitor clinically for any signs of sespsis or respiratory distress     Tamika Vidal Gastuat Syndrome  - rufinamide as above  - other supportive care     Dysphagia  - modified diet with PEG feeding  - patient virtually NPO as she has been sleeping most of her day     Severe protein and calorie malnutrition  - 2/2 chronic illness  - PEG feeding modified today with RD, reduced rate at 55 per hr with increased water flushes  - when awake patient is allowed to modified diet for pleasure      Current Patient Status: Inpatient   Certification Statement: The patient will continue to require additional inpatient hospital stay due to please refer to above plan     Discharge Plan / Estimated Discharge Date: back to NH when neurologically stable        Discharge Plan:  [facility]

## 2017-10-18 NOTE — PROGRESS NOTES
[facility] Internal Medicine Progress Note  Patient: Tamiko Quiroz 40 y o  female   MRN: [id number]  PCP: Modesto Brittle, DO  Unit/Bed#: [facility] 715-01 Encounter: [id number]  Date Of Visit: 10/18/17    Assessment/Plan:  Refractory seizure with abnormal vEEG  - patient on multiple medications   - drugs managed by neurology----> taper clobazam at 10 mg HS and eventually d/c, also as OP might consider d/c rufinamide in the future  - pending levels for keppra, topiramate and rifunimade  - ammonia 90----> 76, repeat in am and increase lactulose more from 20 mg TID to 30 mg TID, to be continued on carnitine  - no further medications changes at this time  - no more clear seizure activity identified  - downgrade care from level 2 stepdown to med/surg  - possible repeat vEEG later this week-----> awaiting final neuro plan    HCAP, suspected G -ve and positive and resistant organism  Sepsis 2/2 # 1, resolved  - patient completed vanco and cefepime   - BC are NTD  - sputum cx was not obtained  - no clear organism isolated, although MRSA nose negative  - stable so far, will monitor clinically for any signs of sespsis or respiratory distress     Lennox Gastuat Syndrome  - rufinamide as above  - other supportive care     Dysphagia  - modified diet with PEG feeding  - patient virtually NPO as she has been sleeping most of her day     Severe protein and calorie malnutrition  - 2/2 chronic illness  - PEG feeding modified today with RD, reduced rate at 55 per hr with increased water flushes  - when awake patient is allowed to modified diet for pleasure     VTE Pharmacologic Prophylaxis: yes  Pharmacologic: Enoxaparin (Lovenox)  Mechanical VTE Prophylaxis in Place: Yes     Patient Centered Rounds: I have performed bedside rounds with nursing staff today      Discussions with Specialists or Other Care Team Provider: none yet today     Education and Discussions with Family / Patient: left a message for mother Sarina Diana again today     Time Spent for Care: 20 minutes   More than 50% of total time spent on counseling and coordination of care as described above      Current Length of Stay: 8 day(s)     Current Patient Status: Inpatient   Certification Statement: The patient will continue to require additional inpatient hospital stay due to please refer to above plan     Discharge Plan / Estimated Discharge Date: back to NH when neurologically stable     Code Status: Level 1 - Full Code    Subjective:   Unable to obtain, patient sleep  At baseline she is non verbal    Objective:     Vitals:   Temp (24hrs), Av °F (36 7 °C), Min:97 6 °F (36 4 °C), Max:98 4 °F (36 9 °C)    HR:  [65-95] 65  Resp:  [16-20] 16  BP: ()/(52-62) 90/60  SpO2:  [96 %-100 %] 97 %  Body mass index is 19 8 kg/m²  Input and Output Summary (last 24 hours): Intake/Output Summary (Last 24 hours) at 10/18/17 1256  Last data filed at 10/18/17 6961   Gross per 24 hour   Intake              ml   Output                0 ml   Net              ml       Physical Exam:     Physical Exam   Constitutional: She appears well-developed  Cardiovascular: Normal rate, regular rhythm and normal heart sounds  Exam reveals no friction rub  No murmur heard  Pulmonary/Chest: Effort normal  No respiratory distress  She has no wheezes  She has rales  Abdominal: Soft  She exhibits no distension  PEG in place   Musculoskeletal: She exhibits no edema  Neurological:   Asleep, moans when touched and when called  Opens eyes only briefly   Skin: Skin is warm         Additional Data:     Labs:      Results from last 7 days  Lab Units 10/17/17  0624   WBC Thousand/uL 8 94   HEMOGLOBIN g/dL 12 3   HEMATOCRIT % 37 2   PLATELETS Thousands/uL 257   NEUTROS PCT % 48   LYMPHS PCT % 35   MONOS PCT % 12   EOS PCT % 4       Results from last 7 days  Lab Units 10/17/17  0945   SODIUM mmol/L 138   POTASSIUM mmol/L 3 7   CHLORIDE mmol/L 105   CO2 mmol/L 25   BUN mg/dL 16   CREATININE mg/dL 0 38* CALCIUM mg/dL 7 5*   TOTAL PROTEIN g/dL 7 3   BILIRUBIN TOTAL mg/dL 0 19*   ALK PHOS U/L 88   ALT U/L 28   AST U/L 19   GLUCOSE RANDOM mg/dL 167*           * I Have Reviewed All Lab Data Listed Above  * Additional Pertinent Lab Tests Reviewed: All Labs Within Last 24 Hours Reviewed    Imaging:    Imaging Reports Reviewed Today Include: none  Imaging Personally Reviewed by Myself Includes:  none    Recent Cultures (last 7 days):           Last 24 Hours Medication List:     artificial tear 1 application Ophthalmic TID   cloBAZam 10 mg Per NG Tube HS   enoxaparin 40 mg Subcutaneous Daily   lactulose 30 g Per PEG Tube TID   levETIRAcetam 1,000 mg Per G Tube Q12H [address]   levOCARNitine 500 mg Per PEG Tube TID   multivitamin-minerals 1 tablet Per G Tube Daily   ofloxacin 1 drop Both Eyes 4x Daily   Perampanel 4 mg Oral Daily   rufinamide 1,600 mg Per G Tube BID   topiramate 250 mg Per G Tube BID   Valproic Acid 250 mg Per G Tube Q8H        Today, Patient Was Seen By: Illona Hashimoto, MD    ** Please Note: Dragon 360 Dictation voice to text software may have been used in the creation of this document   **

## 2017-10-19 ENCOUNTER — APPOINTMENT (INPATIENT)
Dept: NEUROLOGY | Facility: AMBULATORY SURGERY CENTER | Age: 37
DRG: 871 | End: 2017-10-19
Payer: MEDICARE

## 2017-10-19 ENCOUNTER — GENERIC CONVERSION - ENCOUNTER (OUTPATIENT)
Dept: OTHER | Facility: OTHER | Age: 37
End: 2017-10-19

## 2017-10-19 LAB
ALBUMIN SERPL BCP-MCNC: 3 G/DL (ref 3.5–5)
ALP SERPL-CCNC: 88 U/L (ref 46–116)
ALT SERPL W P-5'-P-CCNC: 28 U/L (ref 12–78)
AMMONIA PLAS-SCNC: 71 UMOL/L (ref 11–35)
ANION GAP SERPL CALCULATED.3IONS-SCNC: 8 MMOL/L (ref 4–13)
AST SERPL W P-5'-P-CCNC: 19 U/L (ref 5–45)
BILIRUB SERPL-MCNC: 0.19 MG/DL (ref 0.2–1)
BUN SERPL-MCNC: 16 MG/DL (ref 5–25)
CALCIUM SERPL-MCNC: 9.5 MG/DL (ref 8.3–10.1)
CHLORIDE SERPL-SCNC: 105 MMOL/L (ref 100–108)
CO2 SERPL-SCNC: 25 MMOL/L (ref 21–32)
CREAT SERPL-MCNC: 0.38 MG/DL (ref 0.6–1.3)
GFR SERPL CREATININE-BSD FRML MDRD: 137 ML/MIN/1.73SQ M
GLUCOSE SERPL-MCNC: 167 MG/DL (ref 65–140)
LEVETIRACETAM SERPL-MCNC: 20.1 UG/ML (ref 10–40)
POTASSIUM SERPL-SCNC: 3.7 MMOL/L (ref 3.5–5.3)
PROT SERPL-MCNC: 7.3 G/DL (ref 6.4–8.2)
SODIUM SERPL-SCNC: 138 MMOL/L (ref 136–145)

## 2017-10-19 PROCEDURE — 94762 N-INVAS EAR/PLS OXIMTRY CONT: CPT

## 2017-10-19 PROCEDURE — 82140 ASSAY OF AMMONIA: CPT | Performed by: INTERNAL MEDICINE

## 2017-10-19 PROCEDURE — 94760 N-INVAS EAR/PLS OXIMETRY 1: CPT

## 2017-10-19 PROCEDURE — 95951 HB EEG MONITORING/VIDEORECORD: CPT

## 2017-10-19 RX ORDER — VALPROIC ACID 250 MG/5ML
125 SOLUTION ORAL EVERY 8 HOURS
Status: DISCONTINUED | OUTPATIENT
Start: 2017-10-19 | End: 2017-10-19

## 2017-10-19 RX ORDER — CLOBAZAM 10 MG/1
10 TABLET ORAL
Status: DISCONTINUED | OUTPATIENT
Start: 2017-10-19 | End: 2017-10-22

## 2017-10-19 RX ORDER — LACTULOSE 20 G/30ML
30 SOLUTION ORAL 4 TIMES DAILY
Status: DISCONTINUED | OUTPATIENT
Start: 2017-10-19 | End: 2017-10-22

## 2017-10-19 RX ORDER — TOPIRAMATE 100 MG/1
200 TABLET, FILM COATED ORAL ONCE
Status: COMPLETED | OUTPATIENT
Start: 2017-10-19 | End: 2017-10-19

## 2017-10-19 RX ORDER — VALPROIC ACID 250 MG/5ML
125 SOLUTION ORAL EVERY 8 HOURS
Status: DISCONTINUED | OUTPATIENT
Start: 2017-10-19 | End: 2017-10-20 | Stop reason: DRUGHIGH

## 2017-10-19 RX ORDER — TOPIRAMATE 100 MG/1
300 TABLET, FILM COATED ORAL 2 TIMES DAILY
Status: DISCONTINUED | OUTPATIENT
Start: 2017-10-19 | End: 2017-10-20

## 2017-10-19 RX ADMIN — LACTULOSE 30 G: 20 SOLUTION ORAL at 17:45

## 2017-10-19 RX ADMIN — LEVOCARNITINE 500 MG: 1 SOLUTION ORAL at 17:46

## 2017-10-19 RX ADMIN — LEVETIRACETAM 1000 MG: 100 SOLUTION ORAL at 08:32

## 2017-10-19 RX ADMIN — ENOXAPARIN SODIUM 40 MG: 40 INJECTION SUBCUTANEOUS at 08:32

## 2017-10-19 RX ADMIN — OFLOXACIN 1 DROP: 3 SOLUTION OPHTHALMIC at 08:32

## 2017-10-19 RX ADMIN — VALPROIC ACID 125 MG: 500 SOLUTION ORAL at 21:18

## 2017-10-19 RX ADMIN — TOPIRAMATE 200 MG: 100 TABLET, FILM COATED ORAL at 15:01

## 2017-10-19 RX ADMIN — LEVOCARNITINE 500 MG: 1 SOLUTION ORAL at 08:33

## 2017-10-19 RX ADMIN — MELATONIN TAB 3 MG 6 MG: 3 TAB at 21:11

## 2017-10-19 RX ADMIN — RUFINAMIDE 1600 MG: 200 TABLET, FILM COATED ORAL at 18:03

## 2017-10-19 RX ADMIN — TOPIRAMATE 250 MG: 100 TABLET, FILM COATED ORAL at 08:32

## 2017-10-19 RX ADMIN — PERAMPANEL 4 MG: 4 TABLET ORAL at 08:32

## 2017-10-19 RX ADMIN — LACTULOSE 30 G: 20 SOLUTION ORAL at 08:34

## 2017-10-19 RX ADMIN — VALPROIC ACID 250 MG: 250 SOLUTION ORAL at 05:11

## 2017-10-19 RX ADMIN — OFLOXACIN 1 DROP: 3 SOLUTION OPHTHALMIC at 21:15

## 2017-10-19 RX ADMIN — LACTULOSE 30 G: 20 SOLUTION ORAL at 11:59

## 2017-10-19 RX ADMIN — MINERAL OIL AND WHITE PETROLATUM 1 APPLICATION: 150; 830 OINTMENT OPHTHALMIC at 21:12

## 2017-10-19 RX ADMIN — LEVOCARNITINE 500 MG: 1 SOLUTION ORAL at 21:14

## 2017-10-19 RX ADMIN — RUFINAMIDE 1600 MG: 200 TABLET, FILM COATED ORAL at 08:32

## 2017-10-19 RX ADMIN — LEVETIRACETAM 1000 MG: 100 SOLUTION ORAL at 21:15

## 2017-10-19 RX ADMIN — OFLOXACIN 1 DROP: 3 SOLUTION OPHTHALMIC at 11:59

## 2017-10-19 RX ADMIN — VALPROIC ACID 250 MG: 250 SOLUTION ORAL at 10:38

## 2017-10-19 RX ADMIN — CLOBAZAM 10 MG: 10 TABLET ORAL at 21:14

## 2017-10-19 RX ADMIN — MINERAL OIL AND WHITE PETROLATUM 1 APPLICATION: 150; 830 OINTMENT OPHTHALMIC at 17:46

## 2017-10-19 RX ADMIN — MINERAL OIL AND WHITE PETROLATUM 1 APPLICATION: 150; 830 OINTMENT OPHTHALMIC at 08:32

## 2017-10-19 RX ADMIN — OFLOXACIN 1 DROP: 3 SOLUTION OPHTHALMIC at 17:45

## 2017-10-19 RX ADMIN — LACTULOSE 30 G: 20 SOLUTION ORAL at 21:11

## 2017-10-19 RX ADMIN — TOPIRAMATE 300 MG: 100 TABLET, FILM COATED ORAL at 17:45

## 2017-10-19 RX ADMIN — Medication 1 TABLET: at 08:32

## 2017-10-19 NOTE — PLAN OF CARE
INFECTION - ADULT     Absence of fever/infection during neutropenic period Adequate for Discharge        SAFETY,RESTRAINT: NV/NON-SELF DESTRUCTIVE BEHAVIOR     Remains free of harm/injury (restraint for non violent/non self-detsructive behavior) Adequate for Discharge          DISCHARGE PLANNING     Discharge to home or other facility with appropriate resources Progressing        DISCHARGE PLANNING - CARE MANAGEMENT     Discharge to post-acute care or home with appropriate resources Progressing        INFECTION - ADULT     Absence or prevention of progression during hospitalization Progressing        Knowledge Deficit     Patient/family/caregiver demonstrates understanding of disease process, treatment plan, medications, and discharge instructions Progressing        METABOLIC, FLUID AND ELECTROLYTES - ADULT     Electrolytes maintained within normal limits Progressing     Fluid balance maintained Progressing        NEUROSENSORY - ADULT     Achieves stable or improved neurological status Progressing     Absence of seizures Progressing     Remains free of injury related to seizures activity Progressing     Achieves maximal functionality and self care Progressing        Nutrition/Hydration-ADULT     Nutrient/Hydration intake appropriate for improving, restoring or maintaining nutritional needs Progressing        Potential for Falls     Patient will remain free of falls Progressing        Prexisting or High Potential for Compromised Skin Integrity     Skin integrity is maintained or improved Progressing        RESPIRATORY - ADULT     Achieves optimal ventilation and oxygenation Progressing        SAFETY ADULT     Maintain or return to baseline ADL function Progressing     Maintain or return mobility status to optimal level Progressing

## 2017-10-19 NOTE — PROGRESS NOTES
Epilepsy Consult Follow-up Note  Patient Name: Stephen Le  MRN: [id number]  Date: 10/19/17 Time: 12:49 PM     LOS: 9 days     Interval History:  · EEG monitoring discontinued at 21:00 on 10/15  · EEG monitoring restarted 17:40 on 10/18 - see separate EEG report  · More awake yesterday and today  Appeared to be awake during most of the 24 hour period ending 8 am today  · Clobazam stopped on 10/18 after being decreased to 10 mg qhs from 10 mg bid on 10/17  · I spoke with staff at her facility on 10/17  At baseline she takes a few ~2 hour naps during the day and is awake for a few hours at a time overnight  Usually she wakes up for a time when aroused from a nap (in contrast to my exams)  · Ammonia 12 on 7/6/17, 90 on 10/17/17, 76 on 10/18/17, 71 on 10/19/2017    ROS: Not able to perform  Scheduled Meds:    artificial tear 1 application Ophthalmic TID   enoxaparin 40 mg Subcutaneous Daily   lactulose 30 g Per PEG Tube 4x Daily   levETIRAcetam 1,000 mg Per G Tube Q12H [address]   levOCARNitine 500 mg Per PEG Tube TID   melatonin 6 mg Oral HS   multivitamin-minerals 1 tablet Per G Tube Daily   ofloxacin 1 drop Both Eyes 4x Daily   Perampanel 4 mg Oral Daily   rufinamide 1,600 mg Per G Tube BID   topiramate 250 mg Per G Tube BID   Valproic Acid 250 mg Per G Tube Q8H     Physical Exam   HR:  [63-74] 63  Resp:  [16-20] 18  BP: (94-98)/(55-61) 96/61  SpO2:  [96 %-99 %] 99 %    Neurologic Exam  Mental Status:  Eyes open  Blinks to threat intermittently  Looks around room and holds onto my hand, grabs her doll with the left hand  Cranial Nerves:  Pupils equal  No clear facial asymmetry  Motor:  Moves all 4 extremities spontaneously  At least 4/5 in the uppers  Test Results:  VEEG Results the last 24 hrs: Please see separate report      Model Number: 105   Generator Serial Number: [id number]  Date Implanted: 11/3/2015  Date Performed: 10/19/2017    The patient's vagus nerve stimulator was interrogated and the following information was obtained:  Output Current:  2 00 mA  Frequency: 30 Hz  Pulse Width: 500 µs  Signal On time: 21 sec  Signal Off time: 0 8 min  Magnet Output: 2 25  mAmps  Magnet Pulse Width: 500 µs  Magnet on Time: 60  sec    Systems Diagnostics  Output Current: OK  Lead Impedance (okay/high/low): OK  IFI: No      Impression and Plan:  Stephen Le is a 40 y o  female with 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and severe intellectual disability  Seizures have remained intractable despite multiple concurrent medications (currently on 6 AEDs) and VNS  1  Intractable epilepsy with multiple seizure types, recent worsening of seizures in the setting of infection and including frequent seizures during recent EEG monitoring that primarily occur during sleep  Seizures improved some following transition from lamotrigine to levetiracetam and addition of perampanel on 10/13  Now monitoring seizure burden after/during medication changes  - EEG restarted on 10/18    -- Will restart clobazam with PM dose only  This may help her sleep at night  (order entered)  -- Increase perampanel to 6 mg qhs  (order entered)  -- Decrease valproate to 125 mg q8h with plan to wean off if seizure do not worsen  Likely contributing to elevated ammonia  (order entered)  -- 10/17 topiramate level remains low  Gave extra dose of 200 mg today  -- Continue current levetiracetam, rufinamide, topiramate  -- Follow up levetiracetam, topiramate and rufinamide levels drawn the morning of 10/17  2  Excessive sedation  More awake during the last 24-36 hours  Multifactorial  Ammonia significantly increased from baseline  Recent/current infection contributing  There is also day/night reversal  AEDs are likely contributing  Seizures increase significantly on EEG during sleep and therefore lessening sedation may improve overall seizure burden  -- Decreasing valproate  Will restart night time dose of Onfi, without AM dose     -- Monitor for improvement in ammonia  -- Attempt to orient to appropriate day/night schedule  Stimulate during the day  She was awake from ~2 am to 9 am today  -- Continue melatonin  -- Agree with increased lactulose for elevated ammonia  Continue levocarnitine  3  Pneumonia  S/p abx       Emil Andrews MD Date: 10/19/2017 Time: 12:49 PM

## 2017-10-19 NOTE — PLAN OF CARE
Problem: SAFETY,RESTRAINT: NV/NON-SELF DESTRUCTIVE BEHAVIOR  Goal: Remains free of harm/injury (restraint for non violent/non self-detsructive behavior)  INTERVENTIONS:  - Instruct patient/family regarding restraint use   - Assess and monitor physiologic and psychological status   - Provide interventions and comfort measures to meet assessed patient needs   - Identify and implement measures to help patient regain control  - Assess readiness for release of restraint   Outcome: Progressing

## 2017-10-19 NOTE — PROCEDURES
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 and frequent tonic seizures to evaluate for interval change after in seizure burden  Medications include:   artificial tear 1 application Ophthalmic TID   enoxaparin 40 mg Subcutaneous Daily   lactulose 30 g Per PEG Tube 4x Daily   levETIRAcetam 1,000 mg Per G Tube Q12H [address]   levOCARNitine 500 mg Per PEG Tube TID   melatonin 6 mg Oral HS   multivitamin-minerals 1 tablet Per G Tube Daily   ofloxacin 1 drop Both Eyes 4x Daily   [START ON 10/20/2017] Perampanel 6 mg Oral Daily   rufinamide 1,600 mg Per G Tube BID   topiramate 300 mg Per G Tube BID   Valproic Acid 125 mg Per G Tube Q8H       -------------------------------------------------------------------------------------------------------------------   Description of Procedure:  32 channel digital recording with electrodes placed according to the International 10-20 system with additional T1/T2 electrodes, EOG, EKG, and simultaneous video  A monitoring technologist supervised the continuous recording  The recording was technically satisfactory  -------------------------------------------------------------------------------------------------------------------   Results:   Manual Review:   During wakefulness there were was no definite posteriorly dominant rhythm that attenuated with eye opening   Instead, there were diffuse low amplitude theta activities often near 5 cps, periods of relative suppression with very low amplitude mixed activities  5 cps activities at times emerged after eye closure  There were frequent bursts of arrhythmic generalized spike wave discharges that at times showed shifting lateralization  There were independent left and right temporal spikes  There were occasional brief bursts of 2-2 5 cps rhythmic generalized discharges that at times coorrelated with mild eyelid myoclonia  Discharges became infrequent at times during wakefulness, but were nearly continuous during other epochs  The patient was awake during the majority of this study  Other findings:  Samples of the single channel ECG demonstrated a regular rhythm  Events/Seizures: There were 4 relatively stereotyped tonic seizures involving bilateral tonic extension of the arms lasting for about 2-4 seconds that correlated with diffuse attenuation and low amplitude fast activity  This was followed diffuse 2-2 5 cps high amplitude rhythmic spike wave discharges for 30 sec to 3 minutes  These rhythmic discharges correlated with a clonic phase that was initially diffuse and then became more subtle and isolated to eyelid myoclonia  Some seizures began with a few left temporal discharges  There were absences seizures with eyelid myoclonia that correlated with generalized 2-2 5 cps spike wave discharges typically lasting about 5-8 seconds  At times these occurred multiple times per hour, but there were also hours without seizures  -------------------------------------------------------------------------------------------------------------------   Interpretation: This prolonged, continuous video-EEG recording is abnormal      Background disorganization, theta frequency slowing and frequent generalized as well as independent left and right temporal epileptiform discharges are consistent with the patient's diagnosis of Lennox Gastaut syndrome       During this 13 hours study there were 4 stereotyped tonic-clonic seizures and numerous absence seizures with eyelid myoclonia  Many of the seizures are preceded by a brief burst of left temporal spikes       Ophelia Galicia MD   [address] [facility] [place]

## 2017-10-19 NOTE — PROGRESS NOTES
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  - patient on multiple medications   - drugs managed by neurology----> d/c'd clobazam, also as OP might consider d/c rufinamide in the future  -  levels for keppra, topiramate and rifunimade-----> all within therpeutic range 10/17  - ammonia 90----> 76-----> 71 repeat in am and increase lactulose more from 30 mg TID-----> QID, to be continued on carnitine  - no further medications changes at this time  - vEEG since yesterday afternoon----> awaiting Dr Akil Piña comment on it     HCAP, suspected G -ve and positive and resistant organism, resolved  Sepsis 2/2 # 1, resolved  - patient completed vanco and cefepime   - BC are NTD  - sputum cx was not obtained  - no clear organism isolated, although MRSA nose negative  - stable so far, will monitor clinically for any signs of sespsis or respiratory distress     Dysphagia  - modified diet with PEG feeding  - today she had some bites of her breakfast     Severe protein and calorie malnutrition  - 2/2 chronic illness  - c/w PEG feeding as per EMR  - when awake patient is allowed to modified diet for pleasure----> normally when awake she feeds herself and eats about 25% tray     VTE Pharmacologic Prophylaxis: yes  Pharmacologic: Enoxaparin (Lovenox)  Mechanical VTE Prophylaxis in Place: Yes     Patient Centered Rounds: I have performed bedside rounds with nursing staff today      Discussions with Specialists or Other Care Team Provider: ABNER     Education and Discussions with Family / Patient: multiple attempts to call mother, left several messages     Time Spent for Care: 20 minutes   More than 50% of total time spent on counseling and coordination of care as described above      Current Length of Stay: 9 day(s)     Current Patient Status: Inpatient   Certification Statement: The patient will continue to require additional inpatient hospital stay due to please refer to above plan     Discharge Plan / Estimated Discharge Date: back to NH when neurologically stable     Code Status: Level 1 - Full Code    Subjective:   Unable to obtain  Patient opens her eyes and she is non verbal at baseline    Objective:     Vitals:   Temp (24hrs), Av 9 °F (36 6 °C), Min:97 6 °F (36 4 °C), Max:98 4 °F (36 9 °C)    HR:  [63-74] 63  Resp:  [18-20] 18  BP: (94-96)/(55-61) 96/61  SpO2:  [96 %-99 %] 97 %  Body mass index is 19 8 kg/m²  Input and Output Summary (last 24 hours): Intake/Output Summary (Last 24 hours) at 10/19/17 1552  Last data filed at 10/19/17 1100   Gross per 24 hour   Intake              653 ml   Output                0 ml   Net              653 ml       Physical Exam:     Physical Exam   Constitutional: She appears well-developed  Cardiovascular: Normal rate, regular rhythm and normal heart sounds  Exam reveals no friction rub  No murmur heard  Pulmonary/Chest: Effort normal  No respiratory distress  She has no wheezes  She has rales  Abdominal: Soft  Bowel sounds are normal  She exhibits no distension  There is no tenderness  There is no rebound  PEG in place   Neurological:   Awake and non verbal  Does not cooperate with exam   Skin: Skin is warm         Additional Data:     Labs:      Results from last 7 days  Lab Units 10/17/17  0624   WBC Thousand/uL 8 94   HEMOGLOBIN g/dL 12 3   HEMATOCRIT % 37 2   PLATELETS Thousands/uL 257   NEUTROS PCT % 48   LYMPHS PCT % 35   MONOS PCT % 12   EOS PCT % 4       Results from last 7 days  Lab Units 10/17/17  0945   SODIUM mmol/L 138   POTASSIUM mmol/L 3 7   CHLORIDE mmol/L 105   CO2 mmol/L 25   BUN mg/dL 16   CREATININE mg/dL 0 38*   CALCIUM mg/dL 7 5*   TOTAL PROTEIN g/dL 7 3   BILIRUBIN TOTAL mg/dL 0 19*   ALK PHOS U/L 88   ALT U/L 28 AST U/L 19   GLUCOSE RANDOM mg/dL 167*           * I Have Reviewed All Lab Data Listed Above  * Additional Pertinent Lab Tests Reviewed: All Labs Within Last 24 Hours Reviewed    Imaging:    Imaging Reports Reviewed Today Include: none  Imaging Personally Reviewed by Myself Includes:  none    Recent Cultures (last 7 days):           Last 24 Hours Medication List:     artificial tear 1 application Ophthalmic TID   enoxaparin 40 mg Subcutaneous Daily   lactulose 30 g Per PEG Tube 4x Daily   levETIRAcetam 1,000 mg Per G Tube Q12H [facility]   levOCARNitine 500 mg Per PEG Tube TID   melatonin 6 mg Oral HS   multivitamin-minerals 1 tablet Per G Tube Daily   ofloxacin 1 drop Both Eyes 4x Daily   [START ON 10/20/2017] Perampanel 6 mg Oral Daily   rufinamide 1,600 mg Per G Tube BID   topiramate 300 mg Per G Tube BID   Valproic Acid 125 mg Per G Tube Q8H        Today, Patient Was Seen By: Elsa Shepard MD    ** Please Note: Dragon 360 Dictation voice to text software may have been used in the creation of this document   **

## 2017-10-20 ENCOUNTER — GENERIC CONVERSION - ENCOUNTER (OUTPATIENT)
Dept: OTHER | Facility: OTHER | Age: 37
End: 2017-10-20

## 2017-10-20 LAB
AMMONIA PLAS-SCNC: 64 UMOL/L (ref 11–35)
ANION GAP SERPL CALCULATED.3IONS-SCNC: 8 MMOL/L (ref 4–13)
BASOPHILS # BLD AUTO: 0.06 THOUSANDS/ΜL (ref 0–0.1)
BASOPHILS NFR BLD AUTO: 1 % (ref 0–1)
BUN SERPL-MCNC: 15 MG/DL (ref 5–25)
CALCIUM SERPL-MCNC: 8.9 MG/DL (ref 8.3–10.1)
CHLORIDE SERPL-SCNC: 107 MMOL/L (ref 100–108)
CO2 SERPL-SCNC: 27 MMOL/L (ref 21–32)
CREAT SERPL-MCNC: 0.41 MG/DL (ref 0.6–1.3)
EOSINOPHIL # BLD AUTO: 0.21 THOUSAND/ΜL (ref 0–0.61)
EOSINOPHIL NFR BLD AUTO: 2 % (ref 0–6)
ERYTHROCYTE [DISTWIDTH] IN BLOOD BY AUTOMATED COUNT: 14.1 % (ref 11.6–15.1)
GFR SERPL CREATININE-BSD FRML MDRD: 133 ML/MIN/1.73SQ M
GLUCOSE SERPL-MCNC: 138 MG/DL (ref 65–140)
HCT VFR BLD AUTO: 37.5 % (ref 34.8–46.1)
HGB BLD-MCNC: 12.3 G/DL (ref 11.5–15.4)
LYMPHOCYTES # BLD AUTO: 3.47 THOUSANDS/ΜL (ref 0.6–4.47)
LYMPHOCYTES NFR BLD AUTO: 38 % (ref 14–44)
MAGNESIUM SERPL-MCNC: 2.3 MG/DL (ref 1.6–2.6)
MCH RBC QN AUTO: 33.1 PG (ref 26.8–34.3)
MCHC RBC AUTO-ENTMCNC: 32.8 G/DL (ref 31.4–37.4)
MCV RBC AUTO: 101 FL (ref 82–98)
MONOCYTES # BLD AUTO: 0.99 THOUSAND/ΜL (ref 0.17–1.22)
MONOCYTES NFR BLD AUTO: 11 % (ref 4–12)
NEUTROPHILS # BLD AUTO: 4.27 THOUSANDS/ΜL (ref 1.85–7.62)
NEUTS SEG NFR BLD AUTO: 48 % (ref 43–75)
NRBC BLD AUTO-RTO: 0 /100 WBCS
PHOSPHATE SERPL-MCNC: 4.3 MG/DL (ref 2.7–4.5)
PLATELET # BLD AUTO: 300 THOUSANDS/UL (ref 149–390)
PMV BLD AUTO: 11.2 FL (ref 8.9–12.7)
POTASSIUM SERPL-SCNC: 3.8 MMOL/L (ref 3.5–5.3)
RBC # BLD AUTO: 3.72 MILLION/UL (ref 3.81–5.12)
RUFINAMIDE SERPL-MCNC: 7.6 UG/ML
SODIUM SERPL-SCNC: 142 MMOL/L (ref 136–145)
WBC # BLD AUTO: 9.03 THOUSAND/UL (ref 4.31–10.16)

## 2017-10-20 PROCEDURE — 83735 ASSAY OF MAGNESIUM: CPT | Performed by: INTERNAL MEDICINE

## 2017-10-20 PROCEDURE — 82140 ASSAY OF AMMONIA: CPT | Performed by: INTERNAL MEDICINE

## 2017-10-20 PROCEDURE — 85025 COMPLETE CBC W/AUTO DIFF WBC: CPT | Performed by: INTERNAL MEDICINE

## 2017-10-20 PROCEDURE — 84100 ASSAY OF PHOSPHORUS: CPT | Performed by: INTERNAL MEDICINE

## 2017-10-20 PROCEDURE — 80048 BASIC METABOLIC PNL TOTAL CA: CPT | Performed by: INTERNAL MEDICINE

## 2017-10-20 RX ORDER — VALPROIC ACID 250 MG/5ML
125 SOLUTION ORAL EVERY 8 HOURS
Status: COMPLETED | OUTPATIENT
Start: 2017-10-20 | End: 2017-10-20

## 2017-10-20 RX ADMIN — CLOBAZAM 10 MG: 10 TABLET ORAL at 21:58

## 2017-10-20 RX ADMIN — RUFINAMIDE 1600 MG: 200 TABLET, FILM COATED ORAL at 08:29

## 2017-10-20 RX ADMIN — TOPIRAMATE 300 MG: 100 TABLET, FILM COATED ORAL at 08:31

## 2017-10-20 RX ADMIN — OFLOXACIN 1 DROP: 3 SOLUTION OPHTHALMIC at 08:31

## 2017-10-20 RX ADMIN — OFLOXACIN 1 DROP: 3 SOLUTION OPHTHALMIC at 21:59

## 2017-10-20 RX ADMIN — MINERAL OIL AND WHITE PETROLATUM 1 APPLICATION: 150; 830 OINTMENT OPHTHALMIC at 21:58

## 2017-10-20 RX ADMIN — RUFINAMIDE 1600 MG: 200 TABLET, FILM COATED ORAL at 17:48

## 2017-10-20 RX ADMIN — TOPIRAMATE 250 MG: 100 TABLET, FILM COATED ORAL at 17:45

## 2017-10-20 RX ADMIN — OFLOXACIN 1 DROP: 3 SOLUTION OPHTHALMIC at 17:48

## 2017-10-20 RX ADMIN — LACTULOSE 30 G: 20 SOLUTION ORAL at 21:58

## 2017-10-20 RX ADMIN — ENOXAPARIN SODIUM 40 MG: 40 INJECTION SUBCUTANEOUS at 08:30

## 2017-10-20 RX ADMIN — LEVOCARNITINE 500 MG: 1 SOLUTION ORAL at 21:59

## 2017-10-20 RX ADMIN — Medication 1 TABLET: at 08:31

## 2017-10-20 RX ADMIN — LEVETIRACETAM 1000 MG: 100 SOLUTION ORAL at 08:29

## 2017-10-20 RX ADMIN — MINERAL OIL AND WHITE PETROLATUM 1 APPLICATION: 150; 830 OINTMENT OPHTHALMIC at 08:32

## 2017-10-20 RX ADMIN — LEVOCARNITINE 500 MG: 1 SOLUTION ORAL at 08:29

## 2017-10-20 RX ADMIN — MINERAL OIL AND WHITE PETROLATUM 1 APPLICATION: 150; 830 OINTMENT OPHTHALMIC at 17:48

## 2017-10-20 RX ADMIN — LACTULOSE 30 G: 20 SOLUTION ORAL at 17:45

## 2017-10-20 RX ADMIN — PERAMPANEL 6 MG: 4 TABLET ORAL at 08:26

## 2017-10-20 RX ADMIN — OFLOXACIN 1 DROP: 3 SOLUTION OPHTHALMIC at 12:45

## 2017-10-20 RX ADMIN — VALPROIC ACID 125 MG: 500 SOLUTION ORAL at 12:42

## 2017-10-20 RX ADMIN — LACTULOSE 30 G: 20 SOLUTION ORAL at 12:42

## 2017-10-20 RX ADMIN — LEVOCARNITINE 500 MG: 1 SOLUTION ORAL at 17:47

## 2017-10-20 RX ADMIN — LACTULOSE 30 G: 20 SOLUTION ORAL at 08:30

## 2017-10-20 RX ADMIN — MELATONIN TAB 3 MG 6 MG: 3 TAB at 21:58

## 2017-10-20 RX ADMIN — LEVETIRACETAM 1000 MG: 100 SOLUTION ORAL at 21:59

## 2017-10-20 RX ADMIN — VALPROIC ACID 125 MG: 500 SOLUTION ORAL at 05:58

## 2017-10-20 NOTE — PLAN OF CARE
DISCHARGE PLANNING     Discharge to home or other facility with appropriate resources Progressing        DISCHARGE PLANNING - CARE MANAGEMENT     Discharge to post-acute care or home with appropriate resources Progressing        INFECTION - ADULT     Absence or prevention of progression during hospitalization Progressing     Absence of fever/infection during neutropenic period Progressing        Knowledge Deficit     Patient/family/caregiver demonstrates understanding of disease process, treatment plan, medications, and discharge instructions Progressing        METABOLIC, FLUID AND ELECTROLYTES - ADULT     Electrolytes maintained within normal limits Progressing     Fluid balance maintained Progressing        NEUROSENSORY - ADULT     Achieves stable or improved neurological status Progressing     Absence of seizures Progressing     Remains free of injury related to seizures activity Progressing     Achieves maximal functionality and self care Progressing        Nutrition/Hydration-ADULT     Nutrient/Hydration intake appropriate for improving, restoring or maintaining nutritional needs Progressing        Potential for Falls     Patient will remain free of falls Progressing        Prexisting or High Potential for Compromised Skin Integrity     Skin integrity is maintained or improved Progressing        RESPIRATORY - ADULT     Achieves optimal ventilation and oxygenation Progressing        SAFETY ADULT     Maintain or return to baseline ADL function Progressing     Maintain or return mobility status to optimal level Progressing        SAFETY,RESTRAINT: NV/NON-SELF DESTRUCTIVE BEHAVIOR     Remains free of harm/injury (restraint for non violent/non self-detsructive behavior) Progressing

## 2017-10-20 NOTE — PROGRESS NOTES
Epilepsy Consult Follow-up Note  Patient Name: Valentin Lopez  MRN: [id number]  Date: 10/20/17 Time: 3:22 PM     LOS: 10 days     Interval History:  · EEG monitoring discontinued at 21:00 on 10/15  · EEG monitoring restarted 17:40 on 10/18 and stopped 20:50 on 10/19 - see separate EEG reports  · More awake over the last 2 - 3  Continues to stay awake most of the night  · Clobazam stopped on 10/18 after being decreased to 10 mg qhs from 10 mg bid on 10/17  Bedtime dose of clobazam restarted on 10/19  · Perampanel increased from 4 mg to 6 mg daily on 10/19  · Valproate decreased on 10/19 and stopped on 10/20  · I spoke with staff at her facility on 10/17  At baseline she takes a few ~2 hour naps during the day and is awake for a few hours at a time overnight  Usually she wakes up for a time when aroused from a nap (in contrast to my exams)  · Ammonia 12 on 7/6/17, 90 on 10/17/17, 76 on 10/18/17, 71 on 10/19/2017, 62 on 10/20/2017  ROS: Not able to perform  Scheduled Meds:    artificial tear 1 application Ophthalmic TID   cloBAZam 10 mg Per NG Tube HS   enoxaparin 40 mg Subcutaneous Daily   lactulose 30 g Per PEG Tube 4x Daily   levETIRAcetam 1,000 mg Per G Tube Q12H [facility]   levOCARNitine 500 mg Per PEG Tube TID   melatonin 6 mg Oral HS   multivitamin-minerals 1 tablet Per G Tube Daily   ofloxacin 1 drop Both Eyes 4x Daily   Perampanel 6 mg Oral Daily   rufinamide 1,600 mg Per G Tube BID   topiramate 250 mg Per G Tube BID     Physical Exam   HR:  [76-82] 76  Resp:  [18] 18  BP: ()/(57-71) 83/61  SpO2:  [94 %-98 %] 98 %    Neurologic Exam  Mental Status:  Sleeping  Difficult to arouse  Arouses to painful stimulation only briefly  Blinks to threat intermittently      Cranial Nerves:  Pupils equal  No clear facial asymmetry  Motor:  Moves all 4 extremities to painful stim  At least 4/5 in the uppers  Test Results:  VEEG Results the last 24 hrs: Please see separate report      Model Number: 105   Generator Serial Number: [id number]  Date Implanted: 11/3/2015  Date Performed: 10/18/2017    The patient's vagus nerve stimulator was interrogated and the following information was obtained:  Output Current:  2 00 mA  Frequency: 30 Hz  Pulse Width: 500 µs  Signal On time: 21 sec  Signal Off time: 0 8 min  Magnet Output: 2 25  mAmps  Magnet Pulse Width: 500 µs  Magnet on Time: 60  sec    Systems Diagnostics  Output Current: OK  Lead Impedance (okay/high/low): OK  IFI: No      Impression and Plan:  Alberto Squires is a 40 y o  female with 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and severe intellectual disability  Seizures have remained intractable despite multiple concurrent medications (currently on 6 AEDs) and VNS  1  Intractable epilepsy with multiple seizure types, recent worsening of seizures in the setting of infection and including frequent seizures during recent EEG monitoring that primarily occur during sleep  Seizures improved some following transition from lamotrigine to levetiracetam and addition of perampanel on 10/13  Now monitoring seizure burden after/during medication changes  - EEG restarted on 10/18    -- Valproate stopped today  Likely contributing to elevated ammonia  -- Will need repeat period of EEG monitoring prior to discharge to confirm seizures haven't worsened off of valproate  Plan to restart 24 hours of monitoring tomorrow morning  The recent study was terminated earlier than initially anticipated to accommodate need for emergent EEG monitoring while our hospital monitoring capabilities were at capacity  -- 10/17 topiramate level remains low  Gave extra dose of 100 mg today  Stopping valproate may result in increased topiramate level  2  Abnormal sleep/wake schedule and excessive sedation (now improved)  More awake during the last 2-3 days  Multifactorial  Ammonia significantly increased from baseline  Recent/current infection contributing   There is also day/night reversal  AEDs are likely contributing  Seizures increase significantly on EEG during sleep and therefore lessening sedation may improve overall seizure burden  -- Attempt to orient to appropriate day/night schedule  Stimulate during the day  She was awake from ~2 am to 9 am today  -- Continue melatonin as well as Onfi at night  -- Continue lactulose and levocarnitine  3  Pneumonia  S/p abx       Padmaja Coleman MD Date: 10/20/2017 Time: 3:53 PM

## 2017-10-20 NOTE — SOCIAL WORK
Informed by Dr Elbert Andersen and Dr Theodora Son that pt may a potential discharge on Sunday  Cm contacted Sienna at [facility] to inform of same  She stated that it would be best for pt to return on Monday to ensure staff is in place for her 1:1 at the facility  Dr Theodora Son informed of same and will plan for discharge to [facility] on Monday 10/23

## 2017-10-20 NOTE — PROGRESS NOTES
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  - patient on multiple medications   - drugs managed by neurology----> d/c'd clobazam, also as OP might consider d/c rufinamide in the future  -  levels for keppra, topiramate and rifunimade-----> all within therpeutic range 10/17  - ammonia 90----> 76-----> 71---->64 repeat in am and c/w lactulose more 30 mg QID, to be continued on carnitine  Plan is to try to d/c depakote at this time as this problem has been recurrent overtime  - vEEG reviewed by Dr Valentino Sequeira yesterday given extra dose topamax, taper depakote to d/c, restarted clobazam, refinumide increased   Plan for vEEG tomorrow and if stable with new regimen changes, d/c on Monday     HCAP, suspected G -ve and positive and resistant organism, resolved  Sepsis 2/2 # 1, resolved  - patient completed vanco and cefepime   - BC are NTD  - sputum cx was not obtained  - no clear organism isolated, although MRSA nose negative  - will continue to monitor clinically for any signs of sespsis or respiratory distress     Dysphagia  - modified diet with PEG feeding as below     Severe protein and calorie malnutrition  - 2/2 chronic illness  - c/w PEG feeding as per EMR  - when awake patient is allowed to modified diet for pleasure----> normally when awake she feeds herself and eats about 25% tray  - d/w RD today, no changes     VTE Pharmacologic Prophylaxis: yes  Pharmacologic: Enoxaparin (Lovenox)  Mechanical VTE Prophylaxis in Place: Yes     Patient Centered Rounds: I have performed bedside rounds with nursing staff today      Discussions with Specialists or Other Care Team Provider: neurology     Education and Discussions with Family / Patient: multiple attempts to call mother, left several messages last today     Time Spent for Care: 20 minutes   More than 50% of total time spent on counseling and coordination of care as described above      Current Length of Stay: 10 day(s)     Current Patient Status: Inpatient   Certification Statement: The patient will continue to require additional inpatient hospital stay due to please refer to above plan     Discharge Plan / Estimated Discharge Date: back to [facility] on Monday    Code Status: Level 1 - Full Code       Subjective:   Patient non verbal  Soundly asleep    Objective:     Vitals:   Temp (24hrs), Av 4 °F (36 3 °C), Min:96 6 °F (35 9 °C), Max:97 9 °F (36 6 °C)    HR:  [76-80] 77  Resp:  [18] 18  BP: ()/(52-71) 90/52  SpO2:  [94 %-98 %] 97 %  Body mass index is 19 8 kg/m²  Input and Output Summary (last 24 hours): Intake/Output Summary (Last 24 hours) at 10/20/17 1708  Last data filed at 10/20/17 1501   Gross per 24 hour   Intake             1240 ml   Output                0 ml   Net             1240 ml       Physical Exam:     Physical Exam   Constitutional: She appears well-developed  Cardiovascular: Normal rate, regular rhythm and normal heart sounds  Exam reveals no friction rub  No murmur heard  Pulmonary/Chest: Effort normal  No respiratory distress  She has no wheezes  She has no rales  Coarse BS B/L   Abdominal: Soft  Bowel sounds are normal  She exhibits no distension  There is no tenderness  There is no rebound  PEG in place   Musculoskeletal: She exhibits no edema  Neurological:   Unable to assess   Skin: Skin is warm         Additional Data:     Labs:      Results from last 7 days  Lab Units 10/20/17  0450   WBC Thousand/uL 9 03   HEMOGLOBIN g/dL 12 3   HEMATOCRIT % 37 5   PLATELETS Thousands/uL 300   NEUTROS PCT % 48   LYMPHS PCT % 38   MONOS PCT % 11   EOS PCT % 2       Results from last 7 days  Lab Units 10/20/17  0450 10/17/17  0945   SODIUM mmol/L 142 138   POTASSIUM mmol/L 3 8 3 7   CHLORIDE mmol/L 107 105   CO2 mmol/L 27 25   BUN mg/dL 15 16   CREATININE mg/dL 0 41* 0 38*   CALCIUM mg/dL 8 9 9 5   TOTAL PROTEIN g/dL  --  7 3   BILIRUBIN TOTAL mg/dL  --  0 19*   ALK PHOS U/L  --  88   ALT U/L  --  28   AST U/L  --  19   GLUCOSE RANDOM mg/dL 138 167*           * I Have Reviewed All Lab Data Listed Above  * Additional Pertinent Lab Tests Reviewed: All Labs Within Last 24 Hours Reviewed    Imaging:    Imaging Reports Reviewed Today Include: none  Imaging Personally Reviewed by Myself Includes:  none    Recent Cultures (last 7 days):           Last 24 Hours Medication List:     artificial tear 1 application Ophthalmic TID   cloBAZam 10 mg Per NG Tube HS   enoxaparin 40 mg Subcutaneous Daily   lactulose 30 g Per PEG Tube 4x Daily   levETIRAcetam 1,000 mg Per G Tube Q12H [address]   levOCARNitine 500 mg Per PEG Tube TID   melatonin 6 mg Oral HS   multivitamin-minerals 1 tablet Per G Tube Daily   ofloxacin 1 drop Both Eyes 4x Daily   Perampanel 6 mg Oral Daily   rufinamide 1,600 mg Per G Tube BID   topiramate 250 mg Per G Tube BID        Today, Patient Was Seen By: Brandy Knight MD    ** Please Note: Dragon 360 Dictation voice to text software may have been used in the creation of this document   **

## 2017-10-20 NOTE — PROCEDURES
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 and frequent tonic seizures to evaluate for interval change after in seizure burden  Medications include:     artificial tear 1 application Ophthalmic TID   cloBAZam 10 mg Per NG Tube HS   enoxaparin 40 mg Subcutaneous Daily   lactulose 30 g Per PEG Tube 4x Daily   levETIRAcetam 1,000 mg Per G Tube Q12H [address]   levOCARNitine 500 mg Per PEG Tube TID   melatonin 6 mg Oral HS   multivitamin-minerals 1 tablet Per G Tube Daily   ofloxacin 1 drop Both Eyes 4x Daily   Perampanel 6 mg Oral Daily   rufinamide 1,600 mg Per G Tube BID   topiramate 300 mg Per G Tube BID       -------------------------------------------------------------------------------------------------------------------   Description of Procedure:  32 channel digital recording with electrodes placed according to the International 10-20 system with additional T1/T2 electrodes, EOG, EKG, and simultaneous video  A monitoring technologist supervised the continuous recording  The recording was technically satisfactory  -------------------------------------------------------------------------------------------------------------------   Results:   Manual Review:   During wakefulness there were was no definite posteriorly dominant rhythm that attenuated with eye opening   Instead, there were diffuse low amplitude theta activities often near 5 cps, periods of relative suppression with very low amplitude mixed activities  5 cps activities at times emerged after eye closure  During sleep better regulated 5 cps theta activities attenuated and there were very low to low amplitude poorly regulated mixed frequency theta/alpha activities with intermittent delta and frequent spike/polyspike wave discharges  There were frequent bursts of arrhythmic generalized spike wave discharges that at times showed shifting lateralization  There were independent left and right temporal spikes  There were occasional brief bursts of 2-2 5 cps rhythmic generalized discharges that at times coorrelated with mild eyelid myoclonia  Discharges became infrequent at times during wakefulness, but were nearly continuous during other epochs  The patient was awake during the majority of this study  Other findings:  Samples of the single channel ECG demonstrated a regular rhythm  Events/Seizures: There were frequent sleep enhanced bursts of generalized spike/polyspike wave discharges near 1 5-2 5 cps that at times correlated with eye opening and mild arm movements on a few occasions  The bursts were relatively accurately detected by automated seizure detection Jonathan Ford)  The bursts typically lasted between 3 and 15 seconds, with longer events demonstrating evolution in frequency  None of the stereotyped wallace-clonic seizures (prominent tonic extension of the arms) that were seen during the prior recording period were seen on this recording  Hour Seizure detections per hour Comments   8 9    9 10    10 37 Sleeping (awake during nearly the entire remainder of recording)   11 8    12 3    13 2    14 9    15 3    16 9    17 9    18 16    19 14    20 8    Grand Total 137           -------------------------------------------------------------------------------------------------------------------   Interpretation:    This prolonged, continuous video-EEG recording is abnormal      Background disorganization, theta frequency slowing and frequent generalized as well as independent left and right temporal epileptiform discharges are consistent with the patient's diagnosis of Lennox Gastaut syndrome  During this 9 hours study there were 137 automated seizure detections that correlate very closely with bursts of spikes wave discharges representing atypical absence seizures that often involve eyelid movements and are activated by sleep  None of the stereotyped tonic-clonic seizures captured during the prior recording period are seen on this recording  The patient began removing the EEG electrodes toward the end of the recording  The study was terminated earlier than initially anticipated to accommodate need for emergent EEG monitoring while our hospital monitoring capabilities were at capacity       Evelyn Long MD   [address] [facility] [place]

## 2017-10-21 ENCOUNTER — APPOINTMENT (INPATIENT)
Dept: NEUROLOGY | Facility: AMBULATORY SURGERY CENTER | Age: 37
DRG: 871 | End: 2017-10-21
Payer: MEDICARE

## 2017-10-21 LAB — AMMONIA PLAS-SCNC: 78 UMOL/L (ref 11–35)

## 2017-10-21 PROCEDURE — 95951 HB EEG MONITORING/VIDEORECORD: CPT

## 2017-10-21 PROCEDURE — 82140 ASSAY OF AMMONIA: CPT | Performed by: INTERNAL MEDICINE

## 2017-10-21 RX ADMIN — OFLOXACIN 1 DROP: 3 SOLUTION OPHTHALMIC at 22:00

## 2017-10-21 RX ADMIN — RUFINAMIDE 1600 MG: 200 TABLET, FILM COATED ORAL at 17:22

## 2017-10-21 RX ADMIN — LACTULOSE 30 G: 20 SOLUTION ORAL at 21:47

## 2017-10-21 RX ADMIN — TOPIRAMATE 250 MG: 100 TABLET, FILM COATED ORAL at 17:22

## 2017-10-21 RX ADMIN — TOPIRAMATE 250 MG: 100 TABLET, FILM COATED ORAL at 08:54

## 2017-10-21 RX ADMIN — LACTULOSE 30 G: 20 SOLUTION ORAL at 17:24

## 2017-10-21 RX ADMIN — OFLOXACIN 1 DROP: 3 SOLUTION OPHTHALMIC at 17:23

## 2017-10-21 RX ADMIN — Medication 1 TABLET: at 08:54

## 2017-10-21 RX ADMIN — CLOBAZAM 10 MG: 10 TABLET ORAL at 21:47

## 2017-10-21 RX ADMIN — LACTULOSE 30 G: 20 SOLUTION ORAL at 08:54

## 2017-10-21 RX ADMIN — MELATONIN TAB 3 MG 6 MG: 3 TAB at 21:47

## 2017-10-21 RX ADMIN — MINERAL OIL AND WHITE PETROLATUM 1 APPLICATION: 150; 830 OINTMENT OPHTHALMIC at 17:22

## 2017-10-21 RX ADMIN — LACTULOSE 30 G: 20 SOLUTION ORAL at 12:55

## 2017-10-21 RX ADMIN — PERAMPANEL 6 MG: 4 TABLET ORAL at 09:02

## 2017-10-21 RX ADMIN — ENOXAPARIN SODIUM 40 MG: 40 INJECTION SUBCUTANEOUS at 08:57

## 2017-10-21 RX ADMIN — LEVOCARNITINE 500 MG: 1 SOLUTION ORAL at 08:55

## 2017-10-21 RX ADMIN — LEVETIRACETAM 1000 MG: 100 SOLUTION ORAL at 08:55

## 2017-10-21 RX ADMIN — OFLOXACIN 1 DROP: 3 SOLUTION OPHTHALMIC at 13:08

## 2017-10-21 RX ADMIN — RUFINAMIDE 1600 MG: 200 TABLET, FILM COATED ORAL at 08:56

## 2017-10-21 RX ADMIN — LEVETIRACETAM 1000 MG: 100 SOLUTION ORAL at 21:47

## 2017-10-21 RX ADMIN — LEVOCARNITINE 500 MG: 1 SOLUTION ORAL at 17:22

## 2017-10-21 RX ADMIN — OFLOXACIN 1 DROP: 3 SOLUTION OPHTHALMIC at 08:55

## 2017-10-21 RX ADMIN — MINERAL OIL AND WHITE PETROLATUM 1 APPLICATION: 150; 830 OINTMENT OPHTHALMIC at 08:55

## 2017-10-21 RX ADMIN — LEVOCARNITINE 500 MG: 1 SOLUTION ORAL at 21:47

## 2017-10-21 NOTE — PROGRESS NOTES
Epilepsy Consult Follow-up Note  Patient Name: Lyle Rice  MRN: [id number]  Date: 10/21/17 Time: 12:00 PM     LOS: 11 days     Interval History:  · EEG monitoring discontinued at 21:00 on 10/15  · EEG monitoring restarted 17:40 on 10/18 and stopped 20:50 on 10/19 - see separate EEG reports  · More awake over the last ~3 days  Continues to stay awake much of the night  · Clobazam stopped on 10/18 after being decreased to 10 mg qhs from 10 mg bid on 10/17  Bedtime dose of clobazam restarted on 10/19  · Perampanel increased from 4 mg to 6 mg daily on 10/19  · Valproate decreased on 10/19 and stopped on 10/20  · I spoke with staff at her facility on 10/17  At baseline she takes a few ~2 hour naps during the day and is awake for a few hours at a time overnight  Usually she wakes up for a time when aroused from a nap (in contrast to my exams)  · Ammonia 12 on 7/6/17, 90 on 10/17/17, 76 on 10/18/17, 71 on 10/19/2017, 62 on 10/20/2017, 78 on 10/21  ROS: Not able to perform  Scheduled Meds:    artificial tear 1 application Ophthalmic TID   cloBAZam 10 mg Per NG Tube HS   enoxaparin 40 mg Subcutaneous Daily   lactulose 30 g Per PEG Tube 4x Daily   levETIRAcetam 1,000 mg Per G Tube Q12H [address]   levOCARNitine 500 mg Per PEG Tube TID   melatonin 6 mg Oral HS   multivitamin-minerals 1 tablet Per G Tube Daily   ofloxacin 1 drop Both Eyes 4x Daily   Perampanel 6 mg Oral Daily   rufinamide 1,600 mg Per G Tube BID   topiramate 250 mg Per G Tube BID     Physical Exam   HR:  [62-77] 62  Resp:  [16-18] 16  BP: ()/(52-69) 102/69  SpO2:  [96 %-97 %] 96 %    Neurologic Exam  Mental Status:  Sleeping  Arouses to moderate tactile stimulation for a short time with eyes open and repeatedly adjusts in bed  Cranial Nerves:   No clear facial asymmetry  Motor:  Able to move arms to adjust in bed  Test Results:  VEEG Results the last 24 hrs: Please see separate report      Model Number: 105   Generator Serial Number: [id number]  Date Implanted: 11/3/2015  Date Performed: 10/18/2017    The patient's vagus nerve stimulator was interrogated and the following information was obtained:  Output Current:  2 00 mA  Frequency: 30 Hz  Pulse Width: 500 µs  Signal On time: 21 sec  Signal Off time: 0 8 min  Magnet Output: 2 25  mAmps  Magnet Pulse Width: 500 µs  Magnet on Time: 60  sec    Systems Diagnostics  Output Current: OK  Lead Impedance (okay/high/low): OK  IFI: No      Impression and Plan:  Emilia Alfredo is a 40 y o  female with 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and severe intellectual disability  Seizures have remained intractable despite multiple concurrent medications (currently on 6 AEDs) and VNS  1  Intractable epilepsy with multiple seizure types, recent worsening of seizures in the setting of infection and including frequent seizures during recent EEG monitoring that primarily occur during sleep  Seizures improved some following transition from lamotrigine to levetiracetam and addition of perampanel on 10/13  Now monitoring seizure burden after/during medication changes  - EEG restarted on 10/18    -- 24 hours of VEEG starting today to confirm seizures haven't worsened off of valproate  -- Will draw AED levels tomorrow  2  Abnormal sleep/wake schedule and excessive sedation (now improved)  More awake during the last 2-3 days  Multifactorial  Ammonia significantly increased from baseline  Recent/current infection contributing  There is also day/night reversal  AEDs are likely contributing  Seizures increase significantly on EEG during sleep and therefore lessening sedation may improve overall seizure burden  -- Attempt to orient to appropriate day/night schedule  Stimulate during the day  Room quiet and dark at night  -- Continue melatonin as well as Onfi at night  -- Continue lactulose and levocarnitine  Valproate stopped  3  Pneumonia  S/p abx       Rita Gonzalez MD Date: 10/21/2017 Time: 12:00 PM

## 2017-10-21 NOTE — PROGRESS NOTES
[facility] Internal Medicine Progress Note  Patient: Chele Deal 40 y o  female   MRN: [id number]  PCP: Lelon Favre, DO  Unit/Bed#: [facility] 715-01 Encounter: [id number]  Date Of Visit: 10/21/17       Assessment/Plan:  Refractory seizure with abnormal vEEG in patient with [place] Jie Syndrome  - patient on multiple medications   - drugs managed by neurology----> d/c'd clobazam, also as OP might consider d/c rufinamide in the future  -  levels for keppra, topiramate and rifunimade-----> all within therpeutic range 10/17  - ammonia 90----> 76-----> 71---->64----> 78 c/w lactulose 30 mg QID, to be continued on carnitine   depakote was d/c yesterday, ammonia in am  - vEEG ongoing  - repeat EAD level in am  - c/w topamax, clobazam, rifunamide, perampanel and keppra     HCAP, suspected G -ve and positive and resistant organism, resolved  Sepsis 2/2 # 1, resolved  - patient completed vanco and cefepime   - BC are NTD  - sputum cx was not obtained  - no clear organism isolated, although MRSA nose negative  - will continue to monitor clinically for any signs of sespsis or respiratory distress     Dysphagia  - modified diet with PEG feeding as below     Severe protein and calorie malnutrition  - 2/2 chronic illness  - c/w PEG feeding as per EMR  - when awake patient is allowed to modified diet for pleasure----> normally when awake she feeds herself and eats about 25% tray     VTE Pharmacologic Prophylaxis: yes  Pharmacologic: Enoxaparin (Lovenox)  Mechanical VTE Prophylaxis in Place: Yes     Patient Centered Rounds: I have performed bedside rounds with nursing staff today      Discussions with Specialists or Other Care Team Provider: none yet today     Education and Discussions with Family / Patient: multiple attempts to call mother, left several messages last 10/20     Time Spent for Care: 20 minutes   More than 50% of total time spent on counseling and coordination of care as described above      Current Length of Stay: 11 day(s)     Current Patient Status: Inpatient   Certification Statement: The patient will continue to require additional inpatient hospital stay due to please refer to above plan     Discharge Plan / Estimated Discharge Date: back to [facility] on Monday     Code Status: Level 1 - Full Code    Subjective:   Unable to obtain  Patient opens her eyes but is not interacting with me  At baseline she is non verbal    Objective:     Vitals:   Temp (24hrs), Av 7 °F (36 5 °C), Min:97 5 °F (36 4 °C), Max:97 9 °F (36 6 °C)    HR:  [62-77] 62  Resp:  [16-18] 16  BP: ()/(52-69) 102/69  SpO2:  [96 %-97 %] 96 %  Body mass index is 19 8 kg/m²  Input and Output Summary (last 24 hours): Intake/Output Summary (Last 24 hours) at 10/21/17 1357  Last data filed at 10/21/17 1238   Gross per 24 hour   Intake             1075 ml   Output                0 ml   Net             1075 ml       Physical Exam:     Physical Exam   Constitutional: She appears well-developed  Cardiovascular: Normal rate, regular rhythm and normal heart sounds  Exam reveals no friction rub  No murmur heard  Pulmonary/Chest: Effort normal and breath sounds normal  No respiratory distress  She has no wheezes  She has no rales  Abdominal: Soft  Bowel sounds are normal  She exhibits no distension  There is no tenderness  There is no rebound  PEG in place   Musculoskeletal: She exhibits no edema  Neurological:   Unable to assess   Skin: Skin is warm         Additional Data:     Labs:      Results from last 7 days  Lab Units 10/20/17  0450   WBC Thousand/uL 9 03   HEMOGLOBIN g/dL 12 3   HEMATOCRIT % 37 5   PLATELETS Thousands/uL 300   NEUTROS PCT % 48   LYMPHS PCT % 38   MONOS PCT % 11   EOS PCT % 2       Results from last 7 days  Lab Units 10/20/17  0450 10/17/17  0945   SODIUM mmol/L 142 138   POTASSIUM mmol/L 3 8 3 7   CHLORIDE mmol/L 107 105   CO2 mmol/L 27 25   BUN mg/dL 15 16   CREATININE mg/dL 0 41* 0 38*   CALCIUM mg/dL 8 9 9 5 TOTAL PROTEIN g/dL  --  7 3   BILIRUBIN TOTAL mg/dL  --  0 19*   ALK PHOS U/L  --  88   ALT U/L  --  28   AST U/L  --  19   GLUCOSE RANDOM mg/dL 138 167*           * I Have Reviewed All Lab Data Listed Above  * Additional Pertinent Lab Tests Reviewed: All Labs Within Last 24 Hours Reviewed    Imaging:    Imaging Reports Reviewed Today Include: none  Imaging Personally Reviewed by Myself Includes:  none    Recent Cultures (last 7 days):           Last 24 Hours Medication List:     artificial tear 1 application Ophthalmic TID   cloBAZam 10 mg Per NG Tube HS   enoxaparin 40 mg Subcutaneous Daily   lactulose 30 g Per PEG Tube 4x Daily   levETIRAcetam 1,000 mg Per G Tube Q12H [address]   levOCARNitine 500 mg Per PEG Tube TID   melatonin 6 mg Oral HS   multivitamin-minerals 1 tablet Per G Tube Daily   ofloxacin 1 drop Both Eyes 4x Daily   Perampanel 6 mg Oral Daily   rufinamide 1,600 mg Per G Tube BID   topiramate 250 mg Per G Tube BID        Today, Patient Was Seen By: Aleisha Mora MD    ** Please Note: Dragon 360 Dictation voice to text software may have been used in the creation of this document   **

## 2017-10-22 ENCOUNTER — GENERIC CONVERSION - ENCOUNTER (OUTPATIENT)
Dept: OTHER | Facility: OTHER | Age: 37
End: 2017-10-22

## 2017-10-22 ENCOUNTER — APPOINTMENT (INPATIENT)
Dept: NEUROLOGY | Facility: AMBULATORY SURGERY CENTER | Age: 37
DRG: 871 | End: 2017-10-22
Payer: MEDICARE

## 2017-10-22 LAB
AMMONIA PLAS-SCNC: 55 UMOL/L (ref 11–35)
VALPROATE SERPL-MCNC: 8 UG/ML (ref 50–100)
VALPROATE SERPL-MCNC: 80 UG/ML (ref 50–100)

## 2017-10-22 PROCEDURE — 95951 HB EEG MONITORING/VIDEORECORD: CPT

## 2017-10-22 PROCEDURE — 82140 ASSAY OF AMMONIA: CPT | Performed by: INTERNAL MEDICINE

## 2017-10-22 PROCEDURE — 80201 ASSAY OF TOPIRAMATE: CPT | Performed by: PSYCHIATRY & NEUROLOGY

## 2017-10-22 PROCEDURE — 80175 DRUG SCREEN QUAN LAMOTRIGINE: CPT | Performed by: PSYCHIATRY & NEUROLOGY

## 2017-10-22 PROCEDURE — 80339 ANTIEPILEPTICS NOS 1-3: CPT | Performed by: PSYCHIATRY & NEUROLOGY

## 2017-10-22 PROCEDURE — 80164 ASSAY DIPROPYLACETIC ACD TOT: CPT | Performed by: PSYCHIATRY & NEUROLOGY

## 2017-10-22 PROCEDURE — 80177 DRUG SCRN QUAN LEVETIRACETAM: CPT | Performed by: PSYCHIATRY & NEUROLOGY

## 2017-10-22 RX ORDER — CLOBAZAM 10 MG/1
20 TABLET ORAL
Status: DISCONTINUED | OUTPATIENT
Start: 2017-10-22 | End: 2017-10-26 | Stop reason: HOSPADM

## 2017-10-22 RX ORDER — LORAZEPAM 2 MG/ML
1 INJECTION INTRAMUSCULAR ONCE
Status: COMPLETED | OUTPATIENT
Start: 2017-10-22 | End: 2017-10-22

## 2017-10-22 RX ORDER — VALPROIC ACID 250 MG/5ML
250 SOLUTION ORAL EVERY 8 HOURS SCHEDULED
Status: DISCONTINUED | OUTPATIENT
Start: 2017-10-22 | End: 2017-10-26 | Stop reason: HOSPADM

## 2017-10-22 RX ORDER — LACTULOSE 20 G/30ML
30 SOLUTION ORAL 3 TIMES DAILY
Status: DISCONTINUED | OUTPATIENT
Start: 2017-10-22 | End: 2017-10-26 | Stop reason: HOSPADM

## 2017-10-22 RX ADMIN — TOPIRAMATE 250 MG: 100 TABLET, FILM COATED ORAL at 17:22

## 2017-10-22 RX ADMIN — LACTULOSE 30 G: 20 SOLUTION ORAL at 09:10

## 2017-10-22 RX ADMIN — LACTULOSE 30 G: 20 SOLUTION ORAL at 21:48

## 2017-10-22 RX ADMIN — MINERAL OIL AND WHITE PETROLATUM 1 APPLICATION: 150; 830 OINTMENT OPHTHALMIC at 17:23

## 2017-10-22 RX ADMIN — CLOBAZAM 20 MG: 10 TABLET ORAL at 21:41

## 2017-10-22 RX ADMIN — OFLOXACIN 1 DROP: 3 SOLUTION OPHTHALMIC at 17:23

## 2017-10-22 RX ADMIN — PERAMPANEL 6 MG: 4 TABLET ORAL at 09:37

## 2017-10-22 RX ADMIN — LEVOCARNITINE 500 MG: 1 SOLUTION ORAL at 17:21

## 2017-10-22 RX ADMIN — LEVETIRACETAM 1000 MG: 100 SOLUTION ORAL at 09:15

## 2017-10-22 RX ADMIN — OFLOXACIN 1 DROP: 3 SOLUTION OPHTHALMIC at 12:03

## 2017-10-22 RX ADMIN — OFLOXACIN 1 DROP: 3 SOLUTION OPHTHALMIC at 21:42

## 2017-10-22 RX ADMIN — RUFINAMIDE 1600 MG: 200 TABLET, FILM COATED ORAL at 17:21

## 2017-10-22 RX ADMIN — MINERAL OIL AND WHITE PETROLATUM 1 APPLICATION: 150; 830 OINTMENT OPHTHALMIC at 09:15

## 2017-10-22 RX ADMIN — VALPROATE SODIUM 1000 MG: 100 INJECTION, SOLUTION INTRAVENOUS at 09:31

## 2017-10-22 RX ADMIN — MELATONIN TAB 3 MG 6 MG: 3 TAB at 21:41

## 2017-10-22 RX ADMIN — LEVOCARNITINE 500 MG: 1 SOLUTION ORAL at 21:41

## 2017-10-22 RX ADMIN — OFLOXACIN 1 DROP: 3 SOLUTION OPHTHALMIC at 09:15

## 2017-10-22 RX ADMIN — Medication 1 TABLET: at 09:10

## 2017-10-22 RX ADMIN — MINERAL OIL AND WHITE PETROLATUM 1 APPLICATION: 150; 830 OINTMENT OPHTHALMIC at 21:42

## 2017-10-22 RX ADMIN — VALPROIC ACID 250 MG: 250 SOLUTION ORAL at 17:19

## 2017-10-22 RX ADMIN — ENOXAPARIN SODIUM 40 MG: 40 INJECTION SUBCUTANEOUS at 09:10

## 2017-10-22 RX ADMIN — LACTULOSE 30 G: 20 SOLUTION ORAL at 17:20

## 2017-10-22 RX ADMIN — RUFINAMIDE 1600 MG: 200 TABLET, FILM COATED ORAL at 09:16

## 2017-10-22 RX ADMIN — TOPIRAMATE 250 MG: 100 TABLET, FILM COATED ORAL at 09:10

## 2017-10-22 RX ADMIN — LEVETIRACETAM 1000 MG: 100 SOLUTION ORAL at 21:41

## 2017-10-22 RX ADMIN — VALPROIC ACID 250 MG: 250 SOLUTION ORAL at 21:41

## 2017-10-22 RX ADMIN — LORAZEPAM 1 MG: 2 INJECTION INTRAMUSCULAR; INTRAVENOUS at 18:00

## 2017-10-22 RX ADMIN — LEVOCARNITINE 500 MG: 1 SOLUTION ORAL at 09:15

## 2017-10-22 NOTE — PROGRESS NOTES
Brief Epilepsy/Neurology Note  Seizures worsened in frequency and severity overnight and into this morning  Valproate level confirmed to be near zero  Valproate 1000 mg IV load given at 09:30 and seizures have since improved in severity  No longer having significant tonic and tonic-clonic seizures  Absence seizures are now shorter in duration  This provides strong evidence that continuing divalproex is necessary despite chronically elevated ammonia       Padmaja Coleman MD  10/22/2017 1:11 PM

## 2017-10-22 NOTE — PROGRESS NOTES
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  - patient on multiple medications   - drugs managed by neurology----> d/c'd clobazam, also as OP might consider d/c rufinamide in the future  -  levels for keppra, topiramate and rifunimade-----> all within therpeutic range 10/17  - ammonia 90----> 76-----> 71---->64----> 78----> 55 c/w lactulose 30 mg TID as patient did have several BM overnight as per kenji, to be continued on carnitine  - depakote was d/c yesterday but today she had many seizures on vEEG, for this depakote was resumed, unfortunately will need to accept hyperammonemia  - will FU with neurology for further recc  - c/w topamax, clobazam, rifunamide, perampanel and keppra as well     HCAP, suspected G -ve and positive and resistant organism, resolved  Sepsis 2/2 # 1, resolved  - patient completed vanco and cefepime   - BC are NTD  - sputum cx was not obtained  - no clear organism isolated, although MRSA nose negative  - will continue to monitor clinically for any signs of sespsis or respiratory distress  - no active concerns     Dysphagia  - modified diet with PEG feeding as below     Severe protein and calorie malnutrition  - 2/2 chronic illness  - c/w PEG feeding as per EMR  - when awake patient is allowed to modified diet for pleasure----> normally when awake she feeds herself and eats about 25% tray  - no changes today     VTE Pharmacologic Prophylaxis: yes  Pharmacologic: Enoxaparin (Lovenox)  Mechanical VTE Prophylaxis in Place: Yes     Patient Centered Rounds: I have performed bedside rounds with nursing staff today      Discussions with Specialists or Other Care Team Provider: none yet today     Education and Discussions with Family / Patient: multiple attempts to call mother, left several messages last 10/22     Time Spent for Care: 20 minutes   More than 50% of total time spent on counseling and coordination of care as described above      Current Length of Stay: 12 day(s)     Current Patient Status: Inpatient   Certification Statement: The patient will continue to require additional inpatient hospital stay due to please refer to above plan     Discharge Plan / Estimated Discharge Date: back to [facility] medically stable     Code Status: Level 1 - Full Code    Subjective:   Patient is awake  Tracks me in the room and smiles when I call her name and come close to her bed  Non verbal    Objective:     Vitals:   Temp (24hrs), Av 1 °F (36 7 °C), Min:97 °F (36 1 °C), Max:99 1 °F (37 3 °C)    HR:  [72-73] 73  Resp:  [16-18] 18  BP: ()/(57-78) 124/78  SpO2:  [95 %-98 %] 95 %  Body mass index is 19 8 kg/m²  Input and Output Summary (last 24 hours): Intake/Output Summary (Last 24 hours) at 10/22/17 1407  Last data filed at 10/22/17 1127   Gross per 24 hour   Intake                0 ml   Output                0 ml   Net                0 ml       Physical Exam:     Physical Exam   Constitutional: She appears well-developed  Cardiovascular: Normal rate, regular rhythm and normal heart sounds  Exam reveals no friction rub  No murmur heard  Pulmonary/Chest: Effort normal and breath sounds normal  No respiratory distress  She has no wheezes  She has no rales  Abdominal: Soft  Bowel sounds are normal  She exhibits no distension  There is no tenderness  There is no rebound  PEG in place   Musculoskeletal: She exhibits no edema  Neurological: She is alert  Unable to perform   Skin: Skin is warm         Additional Data:     Labs:      Results from last 7 days  Lab Units 10/20/17  0450   WBC Thousand/uL 9 03   HEMOGLOBIN g/dL 12 3   HEMATOCRIT % 37 5   PLATELETS Thousands/uL 300   NEUTROS PCT % 48   LYMPHS PCT % 38   MONOS PCT % 11   EOS PCT % 2 Results from last 7 days  Lab Units 10/20/17  0450 10/17/17  0945   SODIUM mmol/L 142 138   POTASSIUM mmol/L 3 8 3 7   CHLORIDE mmol/L 107 105   CO2 mmol/L 27 25   BUN mg/dL 15 16   CREATININE mg/dL 0 41* 0 38*   CALCIUM mg/dL 8 9 9 5   TOTAL PROTEIN g/dL  --  7 3   BILIRUBIN TOTAL mg/dL  --  0 19*   ALK PHOS U/L  --  88   ALT U/L  --  28   AST U/L  --  19   GLUCOSE RANDOM mg/dL 138 167*           * I Have Reviewed All Lab Data Listed Above  * Additional Pertinent Lab Tests Reviewed: All Labs Within Last 24 Hours Reviewed    Imaging:    Imaging Reports Reviewed Today Include: none  Imaging Personally Reviewed by Myself Includes:  none    Recent Cultures (last 7 days):           Last 24 Hours Medication List:     artificial tear 1 application Ophthalmic TID   cloBAZam 10 mg Per NG Tube HS   enoxaparin 40 mg Subcutaneous Daily   lactulose 30 g Per PEG Tube TID   levETIRAcetam 1,000 mg Per G Tube Q12H [address]   levOCARNitine 500 mg Per PEG Tube TID   melatonin 6 mg Oral HS   multivitamin-minerals 1 tablet Per G Tube Daily   ofloxacin 1 drop Both Eyes 4x Daily   Perampanel 6 mg Oral Daily   rufinamide 1,600 mg Per G Tube BID   topiramate 250 mg Per G Tube BID   Valproic Acid 250 mg Oral Q8H [address]        Today, Patient Was Seen By: Kristi Tabor MD    ** Please Note: Dragon 360 Dictation voice to text software may have been used in the creation of this document   **

## 2017-10-23 ENCOUNTER — APPOINTMENT (INPATIENT)
Dept: NEUROLOGY | Facility: AMBULATORY SURGERY CENTER | Age: 37
DRG: 871 | End: 2017-10-23
Payer: MEDICARE

## 2017-10-23 ENCOUNTER — GENERIC CONVERSION - ENCOUNTER (OUTPATIENT)
Dept: OTHER | Facility: OTHER | Age: 37
End: 2017-10-23

## 2017-10-23 PROCEDURE — 95951 HB EEG MONITORING/VIDEORECORD: CPT

## 2017-10-23 RX ORDER — CLOBAZAM 10 MG/1
10 TABLET ORAL DAILY
Status: DISCONTINUED | OUTPATIENT
Start: 2017-10-23 | End: 2017-10-26 | Stop reason: HOSPADM

## 2017-10-23 RX ADMIN — MINERAL OIL AND WHITE PETROLATUM 1 APPLICATION: 150; 830 OINTMENT OPHTHALMIC at 17:59

## 2017-10-23 RX ADMIN — Medication 1 TABLET: at 08:57

## 2017-10-23 RX ADMIN — LACTULOSE 30 G: 20 SOLUTION ORAL at 22:09

## 2017-10-23 RX ADMIN — MINERAL OIL AND WHITE PETROLATUM 1 APPLICATION: 150; 830 OINTMENT OPHTHALMIC at 08:56

## 2017-10-23 RX ADMIN — TOPIRAMATE 250 MG: 100 TABLET, FILM COATED ORAL at 17:58

## 2017-10-23 RX ADMIN — LEVETIRACETAM 1000 MG: 100 SOLUTION ORAL at 08:56

## 2017-10-23 RX ADMIN — ENOXAPARIN SODIUM 40 MG: 40 INJECTION SUBCUTANEOUS at 08:56

## 2017-10-23 RX ADMIN — MELATONIN TAB 3 MG 6 MG: 3 TAB at 22:09

## 2017-10-23 RX ADMIN — MINERAL OIL AND WHITE PETROLATUM 1 APPLICATION: 150; 830 OINTMENT OPHTHALMIC at 22:13

## 2017-10-23 RX ADMIN — LACTULOSE 30 G: 20 SOLUTION ORAL at 18:00

## 2017-10-23 RX ADMIN — RUFINAMIDE 1600 MG: 200 TABLET, FILM COATED ORAL at 08:55

## 2017-10-23 RX ADMIN — VALPROIC ACID 250 MG: 250 SOLUTION ORAL at 13:37

## 2017-10-23 RX ADMIN — LEVOCARNITINE 500 MG: 1 SOLUTION ORAL at 08:56

## 2017-10-23 RX ADMIN — LEVOCARNITINE 500 MG: 1 SOLUTION ORAL at 18:00

## 2017-10-23 RX ADMIN — VALPROIC ACID 250 MG: 250 SOLUTION ORAL at 06:04

## 2017-10-23 RX ADMIN — LEVETIRACETAM 1000 MG: 100 SOLUTION ORAL at 22:11

## 2017-10-23 RX ADMIN — TOPIRAMATE 250 MG: 100 TABLET, FILM COATED ORAL at 08:56

## 2017-10-23 RX ADMIN — LACTULOSE 30 G: 20 SOLUTION ORAL at 08:59

## 2017-10-23 RX ADMIN — OFLOXACIN 1 DROP: 3 SOLUTION OPHTHALMIC at 17:58

## 2017-10-23 RX ADMIN — OFLOXACIN 1 DROP: 3 SOLUTION OPHTHALMIC at 22:11

## 2017-10-23 RX ADMIN — LEVOCARNITINE 500 MG: 1 SOLUTION ORAL at 22:10

## 2017-10-23 RX ADMIN — RUFINAMIDE 1600 MG: 200 TABLET, FILM COATED ORAL at 18:01

## 2017-10-23 RX ADMIN — OFLOXACIN 1 DROP: 3 SOLUTION OPHTHALMIC at 08:56

## 2017-10-23 RX ADMIN — PERAMPANEL 6 MG: 4 TABLET ORAL at 08:58

## 2017-10-23 RX ADMIN — VALPROIC ACID 250 MG: 250 SOLUTION ORAL at 22:09

## 2017-10-23 RX ADMIN — OFLOXACIN 1 DROP: 3 SOLUTION OPHTHALMIC at 11:53

## 2017-10-23 RX ADMIN — CLOBAZAM 10 MG: 10 TABLET ORAL at 17:58

## 2017-10-23 RX ADMIN — CLOBAZAM 20 MG: 10 TABLET ORAL at 22:10

## 2017-10-23 NOTE — PROGRESS NOTES
Epilepsy Consult Follow-up Note  Patient Name: Isidro Mena  MRN: [id number]  Date: 10/22/17 Time: 11:39 PM     LOS: 12 days     Interval History:  · Initial EEG monitoring discontinued at 21:00 on 10/15  · EEG monitoring restarted 17:40 on 10/18 and stopped 20:50 on 10/19 - see separate EEG reports  · EEG monitoring restarted 10/21 to assess for interval change in setting of medication changes including taper off valproate  · Seizures worsened in severity and frequency near 5 am on 10/22  Seizures improved after load with valproate 1000 mg, but have picked up again this evening  · Clobazam stopped on 10/18 after being decreased to 10 mg qhs from 10 mg bid on 10/17  Bedtime dose of clobazam restarted on 10/19 and increased to 20 mg qhs on 10/22  · Perampanel increased from 4 mg to 6 mg daily on 10/19  · Valproate decreased on 10/19 and stopped on 10/20  Restarted with loading dose on 10/22  · I spoke with staff at her facility on 10/17  At baseline she takes a few ~2 hour naps during the day and is awake for a few hours at a time overnight  Usually she wakes up for a time when aroused from a nap (in contrast to my exams)  · Ammonia remains elevated, but improved some today  ROS: Not able to perform  Scheduled Meds:    artificial tear 1 application Ophthalmic TID   cloBAZam 20 mg Per NG Tube HS   enoxaparin 40 mg Subcutaneous Daily   lactulose 30 g Per PEG Tube TID   levETIRAcetam 1,000 mg Per G Tube Q12H [address]   levOCARNitine 500 mg Per PEG Tube TID   melatonin 6 mg Oral HS   multivitamin-minerals 1 tablet Per G Tube Daily   ofloxacin 1 drop Both Eyes 4x Daily   Perampanel 6 mg Oral Daily   rufinamide 1,600 mg Per G Tube BID   topiramate 250 mg Per G Tube BID   Valproic Acid 250 mg Oral Q8H [address]     Physical Exam   HR:  [72-90] 82  Resp:  [16-18] 16  BP: ()/(57-78) 105/64  SpO2:  [95 %-99 %] 97 %    Neurologic Exam  Mental Status:  Sleeping   Arouses to moderate tactile stimulation for a short time  Does no open eyes for me  Cranial Nerves:   No clear facial asymmetry  Motor:  Able to move arms to adjust in bed  Test Results:  VEEG Results the last 24 hrs: Please see separate report  Model Number: 105   Generator Serial Number: [id number]  Date Implanted: 11/3/2015  Date Performed: 10/18/2017    The patient's vagus nerve stimulator was interrogated and the following information was obtained:  Output Current:  2 00 mA  Frequency: 30 Hz  Pulse Width: 500 µs  Signal On time: 21 sec  Signal Off time: 0 8 min  Magnet Output: 2 25  mAmps  Magnet Pulse Width: 500 µs  Magnet on Time: 60  sec    Systems Diagnostics  Output Current: OK  Lead Impedance (okay/high/low): OK  IFI: No      Impression and Plan:  Edgardo Caba is a 40 y o  female with 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and severe intellectual disability  Seizures have remained intractable despite multiple concurrent medications (currently on 6 AEDs) and VNS  1  Intractable epilepsy with multiple seizure types, recent worsening of seizures in the setting of infection and including frequent seizures during recent EEG monitoring  Seizures improved some following transition from lamotrigine to levetiracetam and addition of perampanel on 10/13  Valproate weaned due to acute worsening of chronically elevated ammonia, but seizures found to have significantly worsened off valproate  Valproate load 10/22 resulted in total level of 80    -- Continue EEG overnight to determine if seizures are near or improved from her baseline  -- Will give Ativan 1 mg IV for prolonged clusters of tonic-clonic seizures  -- Increase Onfi to 20 mg at night today  2  Abnormal sleep/wake schedule and excessive sedation (now improved)  More awake the second half of this past week  Multifactorial  Ammonia significantly increased from baseline  Recent/current infection contributing  There is also day/night reversal  AEDs are likely contributing   Seizures increase significantly on EEG during sleep and therefore lessening sedation may improve overall seizure burden  -- Attempt to orient to appropriate day/night schedule  Stimulate during the day  Room quiet and dark at night  -- Continue melatonin as well as Onfi at night  -- Continue lactulose and levocarnitine  Valproate stopped  3  Pneumonia  S/p abx       Rowena Borden MD Date: 10/22/2017 Time: 11:39 PM

## 2017-10-23 NOTE — SOCIAL WORK
Per conversation with Dr Lolita Darden, pt is a tentative dc for Wednesday 10/25  Dr Lolita Darden informed  that pt will require a new medication upon returning to [place]  [facility] called [place] and spoke to Yaw to inform of tentative dc 10/25  ABNER also informed Yaw of new medication  Yaw states she will look into it to make sure its available and affordable  Awaiting to hear back from Yaw at [place]

## 2017-10-23 NOTE — CASE MANAGEMENT
Continued Stay Review    Date: 10-22-17        Vital Signs: Temp (24hrs), Av 1 °F (36 7 °C), Min:97 °F (36 1 °C), Max:99 1 °F (37 3 °C)    Medications:     artificial tear 1 application Ophthalmic TID   cloBAZam 10 mg Per NG Tube HS   enoxaparin 40 mg Subcutaneous Daily   lactulose 30 g Per PEG Tube TID   levETIRAcetam 1,000 mg Per G Tube Q12H [address]   levOCARNitine 500 mg Per PEG Tube TID   melatonin 6 mg Oral HS   multivitamin-minerals 1 tablet Per G Tube Daily   ofloxacin 1 drop Both Eyes 4x Daily   Perampanel 6 mg Oral Daily   rufinamide 1,600 mg Per G Tube BID   topiramate 250 mg Per G Tube BID   Valproic Acid 250 mg Oral Q8H [address]         Age/Sex: 40 y o  female     Assessment/Plan:     Refractory seizure with abnormal vEEG in patient with Mai Gouty Syndrome  - patient on multiple medications   - drugs managed by neurology----> d/c'd clobazam, also as OP might consider d/c rufinamide in the future  -  levels for keppra, topiramate and rifunimade-----> all within therpeutic range 10/17  - ammonia 90----> 76-----> 71---->64----> 78----> 55 c/w lactulose 30 mg TID as patient did have several BM overnight as per kenji, to be continued on carnitine  - depakote was d/c yesterday but today she had many seizures on vEEG, for this depakote was resumed, unfortunately will need to accept hyperammonemia  - will FU with neurology for further recc  - c/w topamax, clobazam, rifunamide, perampanel and keppra as well     HCAP, suspected G -ve and positive and resistant organism, resolved  Sepsis 2/ # 1, resolved  - patient completed vanco and cefepime   - BC are NTD  - sputum cx was not obtained  - no clear organism isolated, although MRSA nose negative  - will continue to monitor clinically for any signs of sespsis or respiratory distress  - no active concerns     Dysphagia  - modified diet with PEG feeding as below     Severe protein and calorie malnutrition  - 2/2 chronic illness  - c/w PEG feeding as per EMR  - when awake patient is allowed to modified diet for pleasure----> normally when awake she feeds herself and eats about 25% tray  - no changes today    VIDEO EEG CONTINUOUS      Discharge Plan: discharge to [facility] on Monday 10/23

## 2017-10-23 NOTE — PROGRESS NOTES
Epilepsy Consult Daily Progress Note   Regla Hernandez ZJYWCW 40 y o  female   : 1980  MRN: [id number]     Unit/Bed#: [facility] 715-01    Encounter: [id number]  -------------------------------------------------------------------------------------------------  Interval history:   She had a cluster of seizure of tonic seizures this morning, which stopped after getting her morning medications  She now remains quite sleepy      -------------------------------------------------------------------------------------------------  Past Medical history, surgical history, family history, and social history are unchanged from admission H&P     ROS:  Unable to assess from patient due to cognitive impairment    -------------------------------------------------------------------------------------------------------------------  Allergies: Allergies   Allergen Reactions    Felbamate       Scheduled Meds:   artificial tear 1 application Ophthalmic TID   cloBAZam 20 mg Per NG Tube HS   enoxaparin 40 mg Subcutaneous Daily   lactulose 30 g Per PEG Tube TID   levETIRAcetam 1,000 mg Per G Tube Q12H [address]   levOCARNitine 500 mg Per PEG Tube TID   melatonin 6 mg Oral HS   multivitamin-minerals 1 tablet Per G Tube Daily   ofloxacin 1 drop Both Eyes 4x Daily   Perampanel 6 mg Oral Daily   rufinamide 1,600 mg Per G Tube BID   topiramate 250 mg Per G Tube BID   Valproic Acid 250 mg Oral Q8H [address]     PRN Meds:   acetaminophen    bisacodyl    magnesium hydroxide    ondansetron    ondansetron  -------------------------------------------------------------------------------------------------------------------  Physical Exam:  Vitals: Blood pressure 93/62, pulse 81, temperature 98 3 °F (36 8 °C), temperature source Axillary, resp  rate 20, height 5' 2" (1 575 m), weight 49 1 kg (108 lb 3 9 oz), SpO2 96 %  Neurologic Exam:  Asleep  She arouses by moving slightly to light touch and loud voice, but does not fully awaken or become interactive   She quickly fell back asleep  Cranial nerves:   Unable to fully assess  Eyes were conjugate with some roving eye movements  Motor:   Normal tone throughout  She did not cooperate with strength testing  Sensation:  Responds to tactile stimulation      -------------------------------------------------------------------------------------------------LAB & TEST RESULTS:    Phenytoin Lvl   Date Value Ref Range Status   02/22/2017 <0 4 (L) 10 0 - 20 0 ug/mL Final     Comment:     3   02/16/2017 <0 4 (L) 10 0 - 20 0 ug/mL Final     Valproic Acid, Total   Date Value Ref Range Status   10/22/2017 80 50 - 100 ug/mL Final   10/22/2017 8 (L) 50 - 100 ug/mL Final   10/17/2017 66 50 - 100 ug/mL Final     Levetiracetam Lvl   Date Value Ref Range Status   10/17/2017 20 1 10 0 - 40 0 ug/mL Final   10/14/2017 36 0 10 0 - 40 0 ug/mL Final     Lamotrigine Lvl   Date Value Ref Range Status   10/11/2017 4 6 2 0 - 20 0 ug/mL Final     Comment:                                     Detection Limit = 1 0     Topiramate Lvl   Date Value Ref Range Status   10/17/2017 5 0 2 0 - 25 0 ug/mL Final     Comment:                                     Detection Limit = 1 0   10/11/2017 4 7 2 0 - 25 0 ug/mL Final     Comment:                                     Detection Limit = 1 0     No results found for: [id number]  Ammonia   Date Value Ref Range Status   10/22/2017 55 (H) 11 - 35 umol/L Final     Comment:       Specimen collection should occur prior to Sulfasalazine administration due to the potential for falsely elevated results  Specimen collection should occur prior to Sulfapyridine administration due to the potential for falsely depressed results  10/21/2017 78 (H) 11 - 35 umol/L Final     Comment:       Specimen collection should occur prior to Sulfasalazine administration due to the potential for falsely elevated results   Specimen collection should occur prior to Sulfapyridine administration due to the potential for falsely depressed results  10/20/2017 64 (H) 11 - 35 umol/L Final     Comment:       Specimen collection should occur prior to Sulfasalazine administration due to the potential for falsely elevated results  Specimen collection should occur prior to Sulfapyridine administration due to the potential for falsely depressed results  -------------------------------------------------------------------------------------------------------------------  Assessment/Plan:    Tamiko Quiroz is a 40 y o  woman with 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with intractable seizures and severe intellectual disability  She has continued to have multiple seizures despite her current medications, being on 6 seizure medications and having a VNS  1)  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  She was quite sleepy on examination this morning, and did open her eyes, but would not follow commands  Her seizures have continued and she had a flurry of tonic seizures this morning  These tonic seizures have stopped since she received her morning medications  She had multiple medication changes over the last several days and since admission  It will be important to determine how her seizures will respond to her current medications  She seems to have responded well to restarting Valproate  If she would continue to have seizures, we may be able to increase her Valproate further  Alternatively, we could try to increase her Fycompa  Individuals with Lennox-Gastaut can be very difficult to get theirs seizures under control, or may not be able to get their seizures under control  I would like to assure that she isn't doing worse then upon admission, and first we will need to see how she stabilizes on stable doses of her current regimen  If her seizures remain stable, she may be able to go back to her SNF with ongoing medication changes as an outpatient       She was very sleepy this morning, but this was following a cluster of tonic

## 2017-10-23 NOTE — SOCIAL WORK
Received a call back from Yaw at [facility] who states they are able to accept pt back on Fycompa  Yaw states it will take 2 days to be ordered/delivered  Yaw is requesting scripted be faxed to or called in to [facility] phone# [phone]  Informed Dr Radha Echevarria of same

## 2017-10-23 NOTE — PROGRESS NOTES
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  - patient with multiple seizures on vEEG during the weekend when depakot d/c  - she remains on vEEG and today she had again cluster seizure in am  - as per neurology she is to c/w clobazam 20 mg nightly, Levetiracetam 1000 mg twice a day, Perampanel 6 mg daily, Rufinamide 1600 mg twice a day, and Valproate 250 mg three times a day  - she is to remain on vEEG  - unfortunately she will need to remain in depakote and will need to accept hyperammonemia  - for elevate ammonia she will need to remain on lactulose and levocarnitine     HCAP, suspected G -ve and positive and resistant organism, resolved  Sepsis 2/2 # 1, resolved  - patient completed vanco and cefepime   - BC are NTD  - sputum cx was not obtained  - no clear organism isolated, although MRSA nose negative  - will continue to monitor clinically for any signs of sespsis or respiratory distress  - no active concerns     Dysphagia  - modified diet with PEG feeding as below     Severe protein and calorie malnutrition  - 2/2 chronic illness  - c/w PEG feeding as per EMR  - when awake patient is allowed to modified diet for pleasure    VTE Pharmacologic Prophylaxis: yes  Pharmacologic: Enoxaparin (Lovenox)  Mechanical VTE Prophylaxis in Place: Yes     Patient Centered Rounds: I have performed bedside rounds with nursing staff today      Discussions with Specialists or Other Care Team Provider: none yet today     Education and Discussions with Family / Patient: multiple attempts to call mother, left several messages last 10/22     Time Spent for Care: 20 minutes   More than 50% of total time spent on counseling and coordination of care as described above      Current Length of Stay: 13 day(s)     Current Patient Status: Inpatient   Certification Statement: The patient will continue to require additional inpatient hospital stay due to please refer to above plan     Discharge Plan / Estimated Discharge Date: back to [facility] medically stable     Code Status: Level 1 - Full Code    Subjective:   Unable to obtain  Sleepy, when woken up opens the eyes and goes back to sleep    Objective:     Vitals:   Temp (24hrs), Av 1 °F (36 7 °C), Min:97 9 °F (36 6 °C), Max:98 3 °F (36 8 °C)    HR:  [81-87] 87  Resp:  [16-20] 20  BP: ()/(52-64) 106/52  SpO2:  [96 %-97 %] 97 %  Body mass index is 19 8 kg/m²  Input and Output Summary (last 24 hours): Intake/Output Summary (Last 24 hours) at 10/23/17 1545  Last data filed at 10/23/17 1256   Gross per 24 hour   Intake                0 ml   Output                0 ml   Net                0 ml       Physical Exam:     Physical Exam   Constitutional: She appears well-developed  Cardiovascular: Normal rate, regular rhythm and normal heart sounds  Exam reveals no friction rub  No murmur heard  Pulmonary/Chest: Effort normal  No respiratory distress  She has no wheezes  She has no rales  coarse B/L   Abdominal: Soft  Bowel sounds are normal  She exhibits no distension  There is no tenderness  There is no rebound  Musculoskeletal: She exhibits no edema  Neurological:   Unable to assess   Skin: Skin is warm         Additional Data:     Labs:      Results from last 7 days  Lab Units 10/20/17  0450   WBC Thousand/uL 9 03   HEMOGLOBIN g/dL 12 3   HEMATOCRIT % 37 5   PLATELETS Thousands/uL 300   NEUTROS PCT % 48   LYMPHS PCT % 38   MONOS PCT % 11   EOS PCT % 2       Results from last 7 days  Lab Units 10/20/17  0450 10/17/17  0945   SODIUM mmol/L 142 138   POTASSIUM mmol/L 3 8 3 7   CHLORIDE mmol/L 107 105   CO2 mmol/L 27 25   BUN mg/dL 15 16   CREATININE mg/dL 0 41* 0 38*   CALCIUM mg/dL 8 9 9 5   TOTAL PROTEIN g/dL  --  7 3   BILIRUBIN TOTAL mg/dL  --  0 19*   ALK PHOS U/L  --  88   ALT U/L  --  28   AST U/L  --  19   GLUCOSE RANDOM mg/dL 138 167*           * I Have Reviewed All Lab Data Listed Above  * Additional Pertinent Lab Tests Reviewed: All Labs Within Last 24 Hours Reviewed    Imaging:    Imaging Reports Reviewed Today Include: none  Imaging Personally Reviewed by Myself Includes:  none    Recent Cultures (last 7 days):           Last 24 Hours Medication List:     artificial tear 1 application Ophthalmic TID   cloBAZam 10 mg Per G Tube Daily   cloBAZam 20 mg Per NG Tube HS   enoxaparin 40 mg Subcutaneous Daily   lactulose 30 g Per PEG Tube TID   levETIRAcetam 1,000 mg Per G Tube Q12H [address]   levOCARNitine 500 mg Per PEG Tube TID   melatonin 6 mg Oral HS   multivitamin-minerals 1 tablet Per G Tube Daily   ofloxacin 1 drop Both Eyes 4x Daily   Perampanel 6 mg Oral Daily   rufinamide 1,600 mg Per G Tube BID   topiramate 250 mg Per G Tube BID   Valproic Acid 250 mg Oral Q8H [address]        Today, Patient Was Seen By: Breana Ardon MD    ** Please Note: Dragon 360 Dictation voice to text software may have been used in the creation of this document   **

## 2017-10-23 NOTE — PROCEDURES
Continuous Video EEG Monitoring       Patient Name:  Edgardo Caba  MRN: [id number]   :  1980 File #: [id number] [id number]   Age: 40 y o  Encounter #: [id number]   Start Time: 10/21/2017 16:19 End Time: 10/22/2017 08:00        Report date: 10/22/2017          Study type: Continuous video EEG    ICD 10 diagnosis: Generalized epilepsy intractable with status G40 311 and Lennox Gastaut syndrome    -------------------------------------------------------------------------------------------------------------------   Patient History: This recording was observed in a 40 y o  female with [place] Syndrome and frequent tonic seizures to evaluate for interval change after in seizure burden  Medications include:     artificial tear 1 application Ophthalmic TID   cloBAZam 20 mg Per NG Tube HS   enoxaparin 40 mg Subcutaneous Daily   lactulose 30 g Per PEG Tube TID   levETIRAcetam 1,000 mg Per G Tube Q12H [address]   levOCARNitine 500 mg Per PEG Tube TID   melatonin 6 mg Oral HS   multivitamin-minerals 1 tablet Per G Tube Daily   ofloxacin 1 drop Both Eyes 4x Daily   Perampanel 6 mg Oral Daily   rufinamide 1,600 mg Per G Tube BID   topiramate 250 mg Per G Tube BID   Valproic Acid 250 mg Oral Q8H [address]       -------------------------------------------------------------------------------------------------------------------   Description of Procedure:  32 channel digital recording with electrodes placed according to the International 10-20 system with additional T1/T2 electrodes, EOG, EKG, and simultaneous video  A monitoring technologist supervised the continuous recording  The recording was technically satisfactory  -------------------------------------------------------------------------------------------------------------------   Results:   Manual Review:   During wakefulness there were was no definite posteriorly dominant rhythm that attenuated with eye opening   Instead, there were diffuse low amplitude theta activities often near 5 cps, periods of relative suppression with very low amplitude mixed activities  5 cps activities at times emerged after eye closure  During sleep better regulated 5 cps theta activities attenuated and there were very low to low amplitude poorly regulated mixed frequency theta/alpha activities with intermittent delta and frequent spike/polyspike wave discharges  There were frequent bursts of arrhythmic generalized spike wave discharges that at times showed shifting lateralization  There were independent left and right temporal spikes  There were occasional brief bursts of 2-2 5 cps rhythmic generalized discharges that at times coorrelated with mild eyelid myoclonia  Discharges became infrequent at times during wakefulness, but were nearly continuous during other epochs  The patient was awake during the majority of this study  Other findings:  Samples of the single channel ECG demonstrated a regular rhythm  Events/Seizures: There were frequent bursts of generalized spike/polyspike wave discharges near 1 5-2 5 cps that commonly correlated with eye opening and mild arm movements, especially when longer than 5 seconds  Some events involvoled head drop  Occasionally there were tonic-clonic seizures or clonic-tonic seizures where a portion of the seizure involved more significant tonic extension of the arms with diffuse fast activity on the EEG   The seizures were relatively accurately detected by automated seizure detection Kadi Owen)  The bursts typically lasted between 3 and 15 seconds, with longer events demonstrating evolution in frequency  Some events lasted up to 45 seconds  The patient was awake during most of the study and most seizures occurred during wakefulness        Hour Seizure detections per hour Comment   16 1 VEEG started at 16:38   17 6 1 tonic-clonic seizure   18 8    19 8    20 15    21 25    22 24    23 11    0 4    1 23    2 18    3 9    4 18    5 14 5 tonic-clonic seizures   6 9 1 tonic-clonic seizure   7 3    Grand Total 196           -------------------------------------------------------------------------------------------------------------------   Interpretation: This prolonged, continuous video-EEG recording is abnormal      Background disorganization, theta frequency slowing and frequent generalized as well as independent left and right temporal epileptiform discharges are consistent with the diagnosis of Lennox Gastaut syndrome  During this 15 5 hour study there were 196 automated seizure detections that correlate very closely with bursts of spikes wave discharges representing atypical absence seizures that often involve eyelid movements and at times mild arm movemetnts   There were about 6 tonic-clonic seizures during this recording with a cluster occurring between 5 and 6 am      Elkin Pardo MD   [id number] [facility]

## 2017-10-24 ENCOUNTER — GENERIC CONVERSION - ENCOUNTER (OUTPATIENT)
Dept: OTHER | Facility: OTHER | Age: 37
End: 2017-10-24

## 2017-10-24 ENCOUNTER — APPOINTMENT (INPATIENT)
Dept: NEUROLOGY | Facility: AMBULATORY SURGERY CENTER | Age: 37
DRG: 871 | End: 2017-10-24
Payer: MEDICARE

## 2017-10-24 LAB
AMMONIA PLAS-SCNC: 59 UMOL/L (ref 11–35)
ANION GAP SERPL CALCULATED.3IONS-SCNC: 5 MMOL/L (ref 4–13)
BASOPHILS # BLD AUTO: 0.06 THOUSANDS/ΜL (ref 0–0.1)
BASOPHILS NFR BLD AUTO: 1 % (ref 0–1)
BUN SERPL-MCNC: 15 MG/DL (ref 5–25)
CALCIUM SERPL-MCNC: 8.9 MG/DL (ref 8.3–10.1)
CHLORIDE SERPL-SCNC: 107 MMOL/L (ref 100–108)
CO2 SERPL-SCNC: 29 MMOL/L (ref 21–32)
CREAT SERPL-MCNC: 0.46 MG/DL (ref 0.6–1.3)
EOSINOPHIL # BLD AUTO: 0.21 THOUSAND/ΜL (ref 0–0.61)
EOSINOPHIL NFR BLD AUTO: 2 % (ref 0–6)
ERYTHROCYTE [DISTWIDTH] IN BLOOD BY AUTOMATED COUNT: 14.1 % (ref 11.6–15.1)
GFR SERPL CREATININE-BSD FRML MDRD: 128 ML/MIN/1.73SQ M
GLUCOSE SERPL-MCNC: 115 MG/DL (ref 65–140)
HCT VFR BLD AUTO: 37 % (ref 34.8–46.1)
HGB BLD-MCNC: 12.3 G/DL (ref 11.5–15.4)
LAMOTRIGINE SERPL-MCNC: NORMAL UG/ML (ref 2–20)
LYMPHOCYTES # BLD AUTO: 3.47 THOUSANDS/ΜL (ref 0.6–4.47)
LYMPHOCYTES NFR BLD AUTO: 38 % (ref 14–44)
MAGNESIUM SERPL-MCNC: 2.3 MG/DL (ref 1.6–2.6)
MCH RBC QN AUTO: 33.3 PG (ref 26.8–34.3)
MCHC RBC AUTO-ENTMCNC: 33.2 G/DL (ref 31.4–37.4)
MCV RBC AUTO: 100 FL (ref 82–98)
MONOCYTES # BLD AUTO: 0.97 THOUSAND/ΜL (ref 0.17–1.22)
MONOCYTES NFR BLD AUTO: 11 % (ref 4–12)
NEUTROPHILS # BLD AUTO: 4.5 THOUSANDS/ΜL (ref 1.85–7.62)
NEUTS SEG NFR BLD AUTO: 48 % (ref 43–75)
NRBC BLD AUTO-RTO: 0 /100 WBCS
PHOSPHATE SERPL-MCNC: 4 MG/DL (ref 2.7–4.5)
PLATELET # BLD AUTO: 280 THOUSANDS/UL (ref 149–390)
PMV BLD AUTO: 10.9 FL (ref 8.9–12.7)
POTASSIUM SERPL-SCNC: 3.7 MMOL/L (ref 3.5–5.3)
RBC # BLD AUTO: 3.69 MILLION/UL (ref 3.81–5.12)
SODIUM SERPL-SCNC: 141 MMOL/L (ref 136–145)
TOPIRAMATE SERPL-MCNC: 7.2 UG/ML (ref 2–25)
VALPROATE SERPL-MCNC: 46 UG/ML (ref 50–100)
WBC # BLD AUTO: 9.23 THOUSAND/UL (ref 4.31–10.16)

## 2017-10-24 PROCEDURE — 80164 ASSAY DIPROPYLACETIC ACD TOT: CPT | Performed by: PSYCHIATRY & NEUROLOGY

## 2017-10-24 PROCEDURE — 84100 ASSAY OF PHOSPHORUS: CPT | Performed by: INTERNAL MEDICINE

## 2017-10-24 PROCEDURE — 95951 HB EEG MONITORING/VIDEORECORD: CPT

## 2017-10-24 PROCEDURE — 85025 COMPLETE CBC W/AUTO DIFF WBC: CPT | Performed by: INTERNAL MEDICINE

## 2017-10-24 PROCEDURE — 80201 ASSAY OF TOPIRAMATE: CPT | Performed by: PSYCHIATRY & NEUROLOGY

## 2017-10-24 PROCEDURE — 80177 DRUG SCRN QUAN LEVETIRACETAM: CPT | Performed by: PSYCHIATRY & NEUROLOGY

## 2017-10-24 PROCEDURE — 80048 BASIC METABOLIC PNL TOTAL CA: CPT | Performed by: INTERNAL MEDICINE

## 2017-10-24 PROCEDURE — 83735 ASSAY OF MAGNESIUM: CPT | Performed by: INTERNAL MEDICINE

## 2017-10-24 PROCEDURE — 82140 ASSAY OF AMMONIA: CPT | Performed by: INTERNAL MEDICINE

## 2017-10-24 RX ORDER — LORAZEPAM 2 MG/ML
1 INJECTION INTRAMUSCULAR ONCE
Status: COMPLETED | OUTPATIENT
Start: 2017-10-24 | End: 2017-10-24

## 2017-10-24 RX ADMIN — TOPIRAMATE 250 MG: 100 TABLET, FILM COATED ORAL at 17:57

## 2017-10-24 RX ADMIN — VALPROIC ACID 250 MG: 250 SOLUTION ORAL at 13:35

## 2017-10-24 RX ADMIN — MINERAL OIL AND WHITE PETROLATUM 1 APPLICATION: 150; 830 OINTMENT OPHTHALMIC at 21:29

## 2017-10-24 RX ADMIN — LACTULOSE 30 G: 20 SOLUTION ORAL at 09:31

## 2017-10-24 RX ADMIN — TOPIRAMATE 250 MG: 100 TABLET, FILM COATED ORAL at 09:31

## 2017-10-24 RX ADMIN — OFLOXACIN 1 DROP: 3 SOLUTION OPHTHALMIC at 09:32

## 2017-10-24 RX ADMIN — LEVETIRACETAM 1000 MG: 100 SOLUTION ORAL at 21:28

## 2017-10-24 RX ADMIN — PERAMPANEL 8 MG: 4 TABLET ORAL at 13:36

## 2017-10-24 RX ADMIN — VALPROIC ACID 250 MG: 250 SOLUTION ORAL at 05:28

## 2017-10-24 RX ADMIN — CLOBAZAM 10 MG: 10 TABLET ORAL at 09:31

## 2017-10-24 RX ADMIN — LACTULOSE 30 G: 20 SOLUTION ORAL at 21:35

## 2017-10-24 RX ADMIN — LEVOCARNITINE 500 MG: 1 SOLUTION ORAL at 17:56

## 2017-10-24 RX ADMIN — Medication 1 TABLET: at 09:31

## 2017-10-24 RX ADMIN — CLOBAZAM 20 MG: 10 TABLET ORAL at 21:27

## 2017-10-24 RX ADMIN — OFLOXACIN 1 DROP: 3 SOLUTION OPHTHALMIC at 21:28

## 2017-10-24 RX ADMIN — OFLOXACIN 1 DROP: 3 SOLUTION OPHTHALMIC at 12:03

## 2017-10-24 RX ADMIN — RUFINAMIDE 1600 MG: 200 TABLET, FILM COATED ORAL at 09:32

## 2017-10-24 RX ADMIN — ENOXAPARIN SODIUM 40 MG: 40 INJECTION SUBCUTANEOUS at 09:31

## 2017-10-24 RX ADMIN — LACTULOSE 30 G: 20 SOLUTION ORAL at 17:58

## 2017-10-24 RX ADMIN — MELATONIN TAB 3 MG 6 MG: 3 TAB at 21:27

## 2017-10-24 RX ADMIN — OFLOXACIN 1 DROP: 3 SOLUTION OPHTHALMIC at 17:56

## 2017-10-24 RX ADMIN — MINERAL OIL AND WHITE PETROLATUM 1 APPLICATION: 150; 830 OINTMENT OPHTHALMIC at 09:32

## 2017-10-24 RX ADMIN — MINERAL OIL AND WHITE PETROLATUM 1 APPLICATION: 150; 830 OINTMENT OPHTHALMIC at 17:57

## 2017-10-24 RX ADMIN — LEVOCARNITINE 500 MG: 1 SOLUTION ORAL at 21:28

## 2017-10-24 RX ADMIN — RUFINAMIDE 1600 MG: 200 TABLET, FILM COATED ORAL at 17:57

## 2017-10-24 RX ADMIN — VALPROIC ACID 250 MG: 250 SOLUTION ORAL at 21:27

## 2017-10-24 RX ADMIN — LEVETIRACETAM 1000 MG: 100 SOLUTION ORAL at 09:31

## 2017-10-24 RX ADMIN — LORAZEPAM 1 MG: 2 INJECTION INTRAMUSCULAR; INTRAVENOUS at 00:45

## 2017-10-24 RX ADMIN — LEVOCARNITINE 500 MG: 1 SOLUTION ORAL at 09:31

## 2017-10-24 NOTE — PROGRESS NOTES
Epilepsy Consult Daily Progress Note   Juliocesar House [id number] 40 y o  female   : 1980  MRN: [id number]     Unit/Bed#: [facility] 715-01    Encounter: [id number]  -------------------------------------------------------------------------------------------------  Interval history:   Acosta Jalloh has continued to have frequent tonic seizures, mainly when asleep  When she is awake, she tends to not have any seizures  Her seizures do seem to be a little less severe than on prior days, as they have less prominent clonic activity  She continues to be quite sleepy, and appears to have some dysregulation of her sleep patterns  She was again asleep for my evaluation today, and did not arouse to loud voice or mild painful stimulation       -------------------------------------------------------------------------------------------------  Past Medical history, surgical history, family history, and social history are unchanged from admission H&P     ROS:  Unable to assess from patient due to cognitive impairment    -------------------------------------------------------------------------------------------------------------------  Allergies:    Allergies   Allergen Reactions    Felbamate       Scheduled Meds:     artificial tear 1 application Ophthalmic TID   cloBAZam 10 mg Per G Tube Daily   cloBAZam 20 mg Per NG Tube HS   enoxaparin 40 mg Subcutaneous Daily   lactulose 30 g Per PEG Tube TID   levETIRAcetam 1,000 mg Per G Tube Q12H [address]   levOCARNitine 500 mg Per PEG Tube TID   melatonin 6 mg Oral HS   multivitamin-minerals 1 tablet Per G Tube Daily   ofloxacin 1 drop Both Eyes 4x Daily   Perampanel 8 mg Oral Daily   rufinamide 1,600 mg Per G Tube BID   topiramate 250 mg Per G Tube BID   Valproic Acid 250 mg Oral Q8H [address]     PRN Meds:   acetaminophen    bisacodyl    magnesium hydroxide    ondansetron   ondansetron  -------------------------------------------------------------------------------------------------------------------  Physical Exam:  Vitals: Blood pressure 91/58, pulse 90, temperature 99 °F (37 2 °C), temperature source Axillary, resp  rate 20, height 5' 2" (1 575 m), weight 49 1 kg (108 lb 3 9 oz), SpO2 96 %  Neurologic Exam:  Asleep  She arouses by moving slightly to light touch and loud voice, but does not fully awaken or become interactive  She quickly fell back asleep  Cranial nerves:   Unable to fully assess  Eyes were conjugate with some roving eye movements  Motor:   Normal tone throughout  She did not cooperate with strength testing     Sensation:  Responds to tactile stimulation      -------------------------------------------------------------------------------------------------LAB & TEST RESULTS:    Phenytoin Lvl   Date Value Ref Range Status   02/22/2017 <0 4 (L) 10 0 - 20 0 ug/mL Final     Comment:     3   02/16/2017 <0 4 (L) 10 0 - 20 0 ug/mL Final     Valproic Acid, Total   Date Value Ref Range Status   10/24/2017 46 (L) 50 - 100 ug/mL Final   10/22/2017 80 50 - 100 ug/mL Final   10/22/2017 8 (L) 50 - 100 ug/mL Final     Levetiracetam Lvl   Date Value Ref Range Status   10/17/2017 20 1 10 0 - 40 0 ug/mL Final   10/14/2017 36 0 10 0 - 40 0 ug/mL Final     Lamotrigine Lvl   Date Value Ref Range Status   10/11/2017 4 6 2 0 - 20 0 ug/mL Final     Comment:                                     Detection Limit = 1 0     Topiramate Lvl   Date Value Ref Range Status   10/17/2017 5 0 2 0 - 25 0 ug/mL Final     Comment:                                     Detection Limit = 1 0   10/11/2017 4 7 2 0 - 25 0 ug/mL Final     Comment:                                     Detection Limit = 1 0       Ammonia   Date Value Ref Range Status   10/24/2017 59 (H) 11 - 35 umol/L Final     Comment:       Specimen collection should occur prior to Sulfasalazine administration due to the potential for falsely elevated results  Specimen collection should occur prior to Sulfapyridine administration due to the potential for falsely depressed results  10/22/2017 55 (H) 11 - 35 umol/L Final     Comment:       Specimen collection should occur prior to Sulfasalazine administration due to the potential for falsely elevated results  Specimen collection should occur prior to Sulfapyridine administration due to the potential for falsely depressed results  10/21/2017 78 (H) 11 - 35 umol/L Final     Comment:       Specimen collection should occur prior to Sulfasalazine administration due to the potential for falsely elevated results  Specimen collection should occur prior to Sulfapyridine administration due to the potential for falsely depressed results  -------------------------------------------------------------------------------------------------------------------  Assessment/Plan:    Minoo Mckay is a 40 y o  woman with 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with intractable seizures and severe intellectual disability  She has continued to have multiple seizures despite her current medications, being on 6 seizure medications and having a VNS  1)  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  She was quite sleepy on examination this morning, and did open her eyes, but would not follow commands  Although she continues to have frequent tonic seizures, they do seem to be less severe and less frequent than on previous days  They mainly appear during sleep, and she does not appear to have the seizures during wakefulness  I did compare her EEG to her prior continuous video EEG from 2013, and her epileptiform discharges and the frequency of her tonic seizures do not appear to be dramatically different today compared to that time  I did increase her Fycompa dose this morning  I will not plan on making any other changes to her medications, unless things would dramatically change   She has had periods of wakefulness, and I would like to assure that she is not overly sedated on this amount of medication prior to discharge  Her recent increase in seizures likely was due to no longer being on Onfi and Valproate, and this seems to have improved with reinstitution of these medications  I would like to monitor a little longer to assure this trend continues  Recommendations:  -- Continue EEG to better quantify her seizures  If she is doing about the same tomorrow, may consider coming off EEG   -- Continue clobazam 20 mg nightly, Levetiracetam 1000 mg twice a day, Rufinamide 1600 mg twice a day, and Valproate 250 mg three times a day  Increase Fycompa to 8 mg daily today  -- Continue Melatonin nightly  Would promote awakefulness during the day with all the lights on and appropriate stimulation to promote wakefulness during the day  At night, please have all lights off, TV off, and limit stimulation, especially with noise and light to try to promote sleep at night  -- Also continue Levocarnitine for elevated ammonia       Depending how she does overnight, she may be stable from a Neurologic perspective by Thursday for return to her SNF    -- In anticipation of potential discharge, I did provide a prescription for Perampanel 8 mg daily for her SNF so that they can work on getting authorization for this medication      -------------------------------------------------------------------------------------------------  Ana Luisa Briceño MD        Date: 10/24/2017     Time: 3:08 PM   [address] [facility]

## 2017-10-24 NOTE — PROGRESS NOTES
Dr Jenny Morris called to inform me of patients seizures  And gave verbal order for 1 mg of ativan  Order placed and given to patient

## 2017-10-24 NOTE — PROCEDURES
Continuous Video EEG Monitoring       Patient Name:  Berenice Pichardo  MRN: [id number]   :  1980 File #: [id number]   Age: 40 y o  Encounter #: [id number]   Start Time: 10/22/2017 0800 End Time: 10/23/2017 08:00        Report date: 10/24/2017          Study type: Continuous video EEG    ICD 10 diagnosis: Generalized epilepsy intractable with status G40 311 and Lennox Gastaut syndrome    -------------------------------------------------------------------------------------------------------------------   Patient History: This recording was observed in a 40 y o  female with [place] Syndrome and frequent tonic seizures to evaluate for interval change after in seizure burden  Medications include:     artificial tear 1 application Ophthalmic TID   cloBAZam 10 mg Per G Tube Daily   cloBAZam 20 mg Per NG Tube HS   enoxaparin 40 mg Subcutaneous Daily   lactulose 30 g Per PEG Tube TID   levETIRAcetam 1,000 mg Per G Tube Q12H [address]   levOCARNitine 500 mg Per PEG Tube TID   melatonin 6 mg Oral HS   multivitamin-minerals 1 tablet Per G Tube Daily   ofloxacin 1 drop Both Eyes 4x Daily   Perampanel 8 mg Oral Daily   rufinamide 1,600 mg Per G Tube BID   topiramate 250 mg Per G Tube BID   Valproic Acid 250 mg Oral Q8H [address]       -------------------------------------------------------------------------------------------------------------------   Description of Procedure:  32 channel digital recording with electrodes placed according to the International 10-20 system with additional T1/T2 electrodes, EOG, EKG, and simultaneous video  A monitoring technologist supervised the continuous recording  The recording was technically satisfactory  -------------------------------------------------------------------------------------------------------------------   Results:   Manual Review:   During wakefulness there were was no definite posteriorly dominant rhythm that attenuated with eye opening   Instead, there were diffuse low amplitude theta activities often near 5 cps, periods of relative suppression with very low amplitude mixed activities  5 cps activities at times emerged after eye closure  During sleep better regulated 5 cps theta activities attenuated and there were very low to low amplitude poorly regulated mixed frequency theta/alpha activities with intermittent delta and frequent spike/polyspike wave discharges  There were frequent bursts of arrhythmic generalized spike wave discharges that at times showed shifting lateralization  There were independent left and right temporal spikes  There were bursts of 2-2 5 cps rhythmic generalized discharges that at times coorrelated with mild eyelid myoclonia  Discharges became infrequent at times during wakefulness, but were nearly continuous during other epochs  The patient was awake during the majority of this study  At times during sleep there were intermittent poorly regulated low to medium amplitude delta activities near 1-2 cps with overriding very low amplitude mixed frequency theta/alpha and beta  During other epochs there were diffuse low amplitude beta activity with intermittent mild bursts of mixed frequency theta/delta  Most of the sleep during this study occurred between 0200 and 0600  Other findings:  Samples of the single channel ECG demonstrated a regular rhythm  Events/Seizures: There were frequent bursts of generalized spike/polyspike wave discharges near 1 5-2 5 cps that commonly correlated with eye opening and mild arm movements, especially when longer than 5 seconds  Some events involvoled head drop  Occasionally there were tonic-clonic seizures or clonic-tonic seizures where a portion of the seizure involved more significant tonic extension of the arms with diffuse fast activity on the EEG   The seizures were relatively accurately detected by automated seizure detection Ervinlinda Moreno)   The bursts typically lasted between 3 and 15 seconds, with longer events demonstrating evolution in frequency  Some events lasted up to 45 seconds  The patient was awake during most of the study and most seizures occurred during wakefulness  Seizure detections are noted below  Detection occurring within one minute of another detection are considered one event  Some more severe tonic-clonic seizures consisting primarily of diffuse fast activity were not captured by [facility] and were manually added to the table below  Hour Seizures per hour Comment   8 11 1 tonic-clonic seizure   9 14 3 tonic-clonic seizures   10 13    11 13    12 7    13 8    14 15    15 5    16 8    17 8    18 4    19 15    20 28    21 20    22 18 1 tonic-clonic seizure   23 12 4 tonic-clonic seizures   0 13 1 tonic-clonic seizure   1 13    2 15 Frequent runs of paroxysmal fast during sleep, w/o clear clinical correlate   3 15    4 0    5 3    6 3 1 tonic-clonic seizure   7 10 9 tonic-clonic seizures   Grand Total 271             -------------------------------------------------------------------------------------------------------------------   Interpretation: This prolonged, continuous video-EEG recording is abnormal      Background disorganization, theta frequency slowing and frequent generalized as well as independent left and right temporal epileptiform discharges are consistent with the diagnosis of Lennox Gastaut syndrome  During this 24 hour study there were approximately 271 electrographic seizures consisting of rhythmic bursts of spike wave discharges lasting about 4 to 45 seconds in duration  Longer seizures often more prominent clinical correlate that variably included, eyelid myoclonia, head drop, movements of the upper extremities  20 of the seizures are more prominent tonic-clonic or clonic-tonic events, lasting 15-45 seconds, involving a period of tonic extension of the arms correlating with diffuse paroxysmal fast activity on EEG   These more clinically prominent seizures increase near the end of recording        Yina Gomez MD   [address] [facility]

## 2017-10-24 NOTE — PROGRESS NOTES
[facility] Progress Note  Patient: Ewelina Beverly 40 y o  female   MRN: [id number]  PCP: Lavenia Sacks, DO  [facility] Encounter: [id number]  Date Of Visit: 10/24/17    Assessment:    Principal Problem:    HCAP (healthcare-associated pneumonia)  Active Problems:    Sepsis ([facility] [address] )    Seizure ([facility] [address] )    Dysphagia    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[facility] [address] )      Plan:    · 1  Lennox-gastaut syndrome- patient still having seizures- On vEEG  Neurology adjusting medications  On clobazam 10mg daily and 20mgQHS, levetiracetam 1000mg BID, perampanel 8mg daily, rufinamide 1600mg BID and valproate 250mg TID   · 2  HCAP- abx course finished  · 3  Severe protein calorie malnutrition secondary to chronic illness  · 4  Dysphagia- on PEG feeds  ·       VTE Pharmacologic Prophylaxis:   Pharmacologic: Enoxaparin (Lovenox)  Mechanical VTE Prophylaxis in Place: Yes    Patient Centered Rounds: I have performed bedside rounds with nursing staff today  Discussions with Specialists or Other Care Team Provider:     Education and Discussions with Family / Patient:     Time Spent for Care: 20 minutes  More than 50% of total time spent on counseling and coordination of care as described above  Current Length of Stay: 14 day(s)    Current Patient Status: Inpatient   Certification Statement: The patient will continue to require additional inpatient hospital stay due to seizures    Discharge Plan / Estimated Discharge Date: once seizure resolves    Code Status: Level 1 - Full Code      Subjective:   Patient seen and examined at bedside  Patient has no new complaints  Objective:     Vitals:   Temp (24hrs), Av 3 °F (36 8 °C), Min:97 5 °F (36 4 °C), Max:99 °F (37 2 °C)    HR:  [80-90] 90  Resp:  [20] 20  BP: ()/(58-65) 91/58  SpO2:  [96 %-97 %] 96 %  Body mass index is 19 8 kg/m²  Input and Output Summary (last 24 hours):        Intake/Output Summary (Last 24 hours) at 10/24/17 1225  Last data filed at 10/24/17 1300   Gross per 24 hour   Intake               60 ml   Output                0 ml   Net               60 ml       Physical Exam:     Physical Exam   HENT:   Head: Normocephalic and atraumatic  Right Ear: External ear normal    Eyes: Conjunctivae and EOM are normal  Pupils are equal, round, and reactive to light  Neck: Normal range of motion  Neck supple  No JVD present  No tracheal deviation present  No thyromegaly present  Cardiovascular: Normal rate, regular rhythm and normal heart sounds  Exam reveals no gallop and no friction rub  No murmur heard  Pulmonary/Chest: Effort normal and breath sounds normal  No respiratory distress  She has no wheezes  She has no rales  Abdominal: Soft  Bowel sounds are normal  She exhibits no distension  There is no tenderness  There is no rebound  +PEG   Musculoskeletal: She exhibits no edema  contracted   Neurological: She is alert  Vitals reviewed  Additional Data:     Labs:      Results from last 7 days  Lab Units 10/24/17  0524   WBC Thousand/uL 9 23   HEMOGLOBIN g/dL 12 3   HEMATOCRIT % 37 0   PLATELETS Thousands/uL 280   NEUTROS PCT % 48   LYMPHS PCT % 38   MONOS PCT % 11   EOS PCT % 2       Results from last 7 days  Lab Units 10/24/17  0524   SODIUM mmol/L 141   POTASSIUM mmol/L 3 7   CHLORIDE mmol/L 107   CO2 mmol/L 29   BUN mg/dL 15   CREATININE mg/dL 0 46*   CALCIUM mg/dL 8 9   GLUCOSE RANDOM mg/dL 115           * I Have Reviewed All Lab Data Listed Above  * Additional Pertinent Lab Tests Reviewed:  [address] [facility] Admission Reviewed    Imaging:    Imaging Reports Reviewed Today Include:   Imaging Personally Reviewed by Myself Includes:      Recent Cultures (last 7 days):           Last 24 Hours Medication List:     artificial tear 1 application Ophthalmic TID   cloBAZam 10 mg Per G Tube Daily   cloBAZam 20 mg Per NG Tube HS   enoxaparin 40 mg Subcutaneous Daily   lactulose 30 g Per PEG Tube TID   levETIRAcetam 1,000 mg Per G Tube Q12H [address]   levOCARNitine 500 mg Per PEG Tube TID   melatonin 6 mg Oral HS   multivitamin-minerals 1 tablet Per G Tube Daily   ofloxacin 1 drop Both Eyes 4x Daily   Perampanel 8 mg Oral Daily   rufinamide 1,600 mg Per G Tube BID   topiramate 250 mg Per G Tube BID   Valproic Acid 250 mg Oral [facility]        Today, Patient Was Seen By: Mery Raman MD    ** Please Note: This note has been constructed using a voice recognition system   **

## 2017-10-25 ENCOUNTER — APPOINTMENT (INPATIENT)
Dept: NEUROLOGY | Facility: AMBULATORY SURGERY CENTER | Age: 37
DRG: 871 | End: 2017-10-25
Payer: MEDICARE

## 2017-10-25 LAB
ANION GAP SERPL CALCULATED.3IONS-SCNC: 8 MMOL/L (ref 4–13)
BASOPHILS # BLD AUTO: 0.07 THOUSANDS/ΜL (ref 0–0.1)
BASOPHILS NFR BLD AUTO: 1 % (ref 0–1)
BUN SERPL-MCNC: 14 MG/DL (ref 5–25)
CALCIUM SERPL-MCNC: 9 MG/DL (ref 8.3–10.1)
CHLORIDE SERPL-SCNC: 107 MMOL/L (ref 100–108)
CO2 SERPL-SCNC: 24 MMOL/L (ref 21–32)
CREAT SERPL-MCNC: 0.44 MG/DL (ref 0.6–1.3)
EOSINOPHIL # BLD AUTO: 0.32 THOUSAND/ΜL (ref 0–0.61)
EOSINOPHIL NFR BLD AUTO: 4 % (ref 0–6)
ERYTHROCYTE [DISTWIDTH] IN BLOOD BY AUTOMATED COUNT: 14.1 % (ref 11.6–15.1)
GFR SERPL CREATININE-BSD FRML MDRD: 130 ML/MIN/1.73SQ M
GLUCOSE SERPL-MCNC: 94 MG/DL (ref 65–140)
HCT VFR BLD AUTO: 36.3 % (ref 34.8–46.1)
HGB BLD-MCNC: 12 G/DL (ref 11.5–15.4)
LEVETIRACETAM SERPL-MCNC: 29.4 UG/ML (ref 10–40)
LYMPHOCYTES # BLD AUTO: 3.79 THOUSANDS/ΜL (ref 0.6–4.47)
LYMPHOCYTES NFR BLD AUTO: 43 % (ref 14–44)
MCH RBC QN AUTO: 33.2 PG (ref 26.8–34.3)
MCHC RBC AUTO-ENTMCNC: 33.1 G/DL (ref 31.4–37.4)
MCV RBC AUTO: 101 FL (ref 82–98)
MONOCYTES # BLD AUTO: 0.84 THOUSAND/ΜL (ref 0.17–1.22)
MONOCYTES NFR BLD AUTO: 10 % (ref 4–12)
NEUTROPHILS # BLD AUTO: 3.69 THOUSANDS/ΜL (ref 1.85–7.62)
NEUTS SEG NFR BLD AUTO: 42 % (ref 43–75)
NRBC BLD AUTO-RTO: 0 /100 WBCS
PLATELET # BLD AUTO: 244 THOUSANDS/UL (ref 149–390)
PMV BLD AUTO: 11.1 FL (ref 8.9–12.7)
POTASSIUM SERPL-SCNC: 3.7 MMOL/L (ref 3.5–5.3)
RBC # BLD AUTO: 3.61 MILLION/UL (ref 3.81–5.12)
SODIUM SERPL-SCNC: 139 MMOL/L (ref 136–145)
TOPIRAMATE SERPL-MCNC: 8.2 UG/ML (ref 2–25)
WBC # BLD AUTO: 8.73 THOUSAND/UL (ref 4.31–10.16)

## 2017-10-25 PROCEDURE — 85025 COMPLETE CBC W/AUTO DIFF WBC: CPT | Performed by: INTERNAL MEDICINE

## 2017-10-25 PROCEDURE — 80048 BASIC METABOLIC PNL TOTAL CA: CPT | Performed by: INTERNAL MEDICINE

## 2017-10-25 PROCEDURE — 95951 HB EEG MONITORING/VIDEORECORD: CPT

## 2017-10-25 RX ADMIN — RUFINAMIDE 1600 MG: 200 TABLET, FILM COATED ORAL at 08:19

## 2017-10-25 RX ADMIN — OFLOXACIN 1 DROP: 3 SOLUTION OPHTHALMIC at 12:39

## 2017-10-25 RX ADMIN — CLOBAZAM 20 MG: 10 TABLET ORAL at 21:51

## 2017-10-25 RX ADMIN — PERAMPANEL 8 MG: 4 TABLET ORAL at 08:18

## 2017-10-25 RX ADMIN — LEVETIRACETAM 1000 MG: 100 SOLUTION ORAL at 21:50

## 2017-10-25 RX ADMIN — TOPIRAMATE 250 MG: 100 TABLET, FILM COATED ORAL at 17:05

## 2017-10-25 RX ADMIN — MINERAL OIL AND WHITE PETROLATUM 1 APPLICATION: 150; 830 OINTMENT OPHTHALMIC at 17:07

## 2017-10-25 RX ADMIN — LEVOCARNITINE 500 MG: 1 SOLUTION ORAL at 21:50

## 2017-10-25 RX ADMIN — OFLOXACIN 1 DROP: 3 SOLUTION OPHTHALMIC at 21:59

## 2017-10-25 RX ADMIN — RUFINAMIDE 1600 MG: 200 TABLET, FILM COATED ORAL at 17:08

## 2017-10-25 RX ADMIN — OFLOXACIN 1 DROP: 3 SOLUTION OPHTHALMIC at 08:18

## 2017-10-25 RX ADMIN — LEVOCARNITINE 500 MG: 1 SOLUTION ORAL at 08:19

## 2017-10-25 RX ADMIN — MINERAL OIL AND WHITE PETROLATUM 1 APPLICATION: 150; 830 OINTMENT OPHTHALMIC at 21:59

## 2017-10-25 RX ADMIN — VALPROIC ACID 250 MG: 250 SOLUTION ORAL at 21:50

## 2017-10-25 RX ADMIN — MELATONIN TAB 3 MG 6 MG: 3 TAB at 21:51

## 2017-10-25 RX ADMIN — LACTULOSE 30 G: 20 SOLUTION ORAL at 08:17

## 2017-10-25 RX ADMIN — LACTULOSE 30 G: 20 SOLUTION ORAL at 17:09

## 2017-10-25 RX ADMIN — TOPIRAMATE 250 MG: 100 TABLET, FILM COATED ORAL at 08:18

## 2017-10-25 RX ADMIN — LEVETIRACETAM 1000 MG: 100 SOLUTION ORAL at 08:18

## 2017-10-25 RX ADMIN — VALPROIC ACID 250 MG: 250 SOLUTION ORAL at 14:29

## 2017-10-25 RX ADMIN — Medication 1 TABLET: at 08:18

## 2017-10-25 RX ADMIN — OFLOXACIN 1 DROP: 3 SOLUTION OPHTHALMIC at 17:07

## 2017-10-25 RX ADMIN — VALPROIC ACID 250 MG: 250 SOLUTION ORAL at 06:10

## 2017-10-25 RX ADMIN — LACTULOSE 30 G: 20 SOLUTION ORAL at 21:51

## 2017-10-25 RX ADMIN — CLOBAZAM 10 MG: 10 TABLET ORAL at 08:18

## 2017-10-25 RX ADMIN — MINERAL OIL AND WHITE PETROLATUM 1 APPLICATION: 150; 830 OINTMENT OPHTHALMIC at 08:18

## 2017-10-25 RX ADMIN — LEVOCARNITINE 500 MG: 1 SOLUTION ORAL at 17:05

## 2017-10-25 RX ADMIN — ENOXAPARIN SODIUM 40 MG: 40 INJECTION SUBCUTANEOUS at 08:17

## 2017-10-25 NOTE — PLAN OF CARE

## 2017-10-25 NOTE — PROGRESS NOTES
Govind Gonzalez [facility] Internal Medicine Progress Note  Patient: Stephen Le 40 y o  female   MRN: [id number]  PCP: Annita Keating DO  Unit/Bed#: [facility] 715-01 Encounter: [id number]  Date Of Visit: 10/25/17    Assessment:    Principal Problem:    HCAP (healthcare-associated pneumonia)  Active Problems:    Sepsis ([facility] [address] )    Seizure ([facility] [address] )    Dysphagia    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[facility] [address] )      Plan:    · 1  Lennox-gastaut syndrome- patient still having seizures- On vEEG  Neurology adjusting medications  On clobazam 10mg am and 20mgQHS, levetiracetam 1000mg BID, perampanel 8mg daily, rufinamide 1600mg BID and valproate 250mg TID  Patient to be taken off vEEG if seizure improves  For possible d/c back to SNF in am  · 2  HCAP- abx course finished  · 3  Severe protein calorie malnutrition secondary to chronic illness  · 4  Dysphagia- on PEG feeds  ·       VTE Pharmacologic Prophylaxis:   Pharmacologic: Enoxaparin (Lovenox)  Mechanical VTE Prophylaxis in Place: Yes    Patient Centered Rounds: I have performed bedside rounds with nursing staff today  Discussions with Specialists or Other Care Team Provider:     Education and Discussions with Family / Patient:     Time Spent for Care: 20 minutes  More than 50% of total time spent on counseling and coordination of care as described above  Current Length of Stay: 15 day(s)    Current Patient Status: Inpatient   Certification Statement: The patient will continue to require additional inpatient hospital stay due to seizures    Discharge Plan / Estimated Discharge Date: once seizure resolves    Code Status: Level 1 - Full Code      Subjective:   Patient seen and examined at bedside  Patient has no new complaints  Objective:     Vitals:   Temp (24hrs), Av 9 °F (36 6 °C), Min:97 8 °F (36 6 °C), Max:98 1 °F (36 7 °C)    HR:  [] 70  Resp:  [18] 18  BP: ()/(54-62) 88/61  SpO2:  [96 %-98 %] 98 %  Body mass index is 19 8 kg/m²       Input and Output Summary (last 24 hours): Intake/Output Summary (Last 24 hours) at 10/25/17 1434  Last data filed at 10/25/17 1050   Gross per 24 hour   Intake              130 ml   Output                0 ml   Net              130 ml       Physical Exam:     Physical Exam   HENT:   Head: Normocephalic and atraumatic  Right Ear: External ear normal    Eyes: Conjunctivae and EOM are normal  Pupils are equal, round, and reactive to light  Neck: Normal range of motion  Neck supple  No JVD present  No tracheal deviation present  No thyromegaly present  Cardiovascular: Normal rate, regular rhythm and normal heart sounds  Exam reveals no gallop and no friction rub  No murmur heard  Pulmonary/Chest: Effort normal and breath sounds normal  No respiratory distress  She has no wheezes  She has no rales  Abdominal: Soft  Bowel sounds are normal  She exhibits no distension  There is no tenderness  There is no rebound  +PEG   Musculoskeletal: She exhibits no edema  contracted   Neurological: She is alert  Vitals reviewed  Additional Data:     Labs:      Results from last 7 days  Lab Units 10/25/17  0625   WBC Thousand/uL 8 73   HEMOGLOBIN g/dL 12 0   HEMATOCRIT % 36 3   PLATELETS Thousands/uL 244   NEUTROS PCT % 42*   LYMPHS PCT % 43   MONOS PCT % 10   EOS PCT % 4       Results from last 7 days  Lab Units 10/25/17  0625   SODIUM mmol/L 139   POTASSIUM mmol/L 3 7   CHLORIDE mmol/L 107   CO2 mmol/L 24   BUN mg/dL 14   CREATININE mg/dL 0 44*   CALCIUM mg/dL 9 0   GLUCOSE RANDOM mg/dL 94           * I Have Reviewed All Lab Data Listed Above  * Additional Pertinent Lab Tests Reviewed:  [address] Admission Reviewed    Imaging:    Imaging Reports Reviewed Today Include:   Imaging Personally Reviewed by Myself Includes:      Recent Cultures (last 7 days):           Last 24 Hours Medication List:     artificial tear 1 application Ophthalmic TID   cloBAZam 10 mg Per G Tube Daily   cloBAZam 20 mg Per NG Tube HS   enoxaparin 40 mg Subcutaneous Daily   lactulose 30 g Per PEG Tube TID   levETIRAcetam 1,000 mg Per G Tube Q12H [address]   levOCARNitine 500 mg Per PEG Tube TID   melatonin 6 mg Oral HS   multivitamin-minerals 1 tablet Per G Tube Daily   ofloxacin 1 drop Both Eyes 4x Daily   Perampanel 8 mg Oral Daily   rufinamide 1,600 mg Per G Tube BID   topiramate 250 mg Per G Tube BID   Valproic Acid 250 mg Oral Q8H [address]        Today, Patient Was Seen By: Karly Valle MD    ** Please Note: This note has been constructed using a voice recognition system   **

## 2017-10-25 NOTE — PROGRESS NOTES
Epilepsy Consult Daily Progress Note   Linnea Boles 40 y o  female   : 1980  MRN: [id number]     Unit/Bed#: [facility] 715-01    Encounter: [id number]  -------------------------------------------------------------------------------------------------  Interval history:   Although Re Ovalle has continued to have frequent tonic seizures when asleep, these have been less frequent than prior days  She continues to have an irregular sleep pattern where she will sleep a majority of the day and be awake for large portions of the evening  She is not able to answer any questions or provide any insight into her condition       -------------------------------------------------------------------------------------------------  Past Medical history, surgical history, family history, and social history are unchanged from admission H&P     ROS:  Unable to assess from patient due to cognitive impairment    -------------------------------------------------------------------------------------------------------------------  Allergies:    Allergies   Allergen Reactions    Felbamate       Scheduled Meds:     artificial tear 1 application Ophthalmic TID   cloBAZam 10 mg Per G Tube Daily   cloBAZam 20 mg Per NG Tube HS   enoxaparin 40 mg Subcutaneous Daily   lactulose 30 g Per PEG Tube TID   levETIRAcetam 1,000 mg Per G Tube Q12H [address]   levOCARNitine 500 mg Per PEG Tube TID   melatonin 6 mg Oral HS   multivitamin-minerals 1 tablet Per G Tube Daily   ofloxacin 1 drop Both Eyes 4x Daily   Perampanel 8 mg Oral Daily   rufinamide 1,600 mg Per G Tube BID   topiramate 250 mg Per G Tube BID   Valproic Acid 250 mg Oral Q8H [address]     PRN Meds:   acetaminophen    bisacodyl    magnesium hydroxide    ondansetron    ondansetron  -------------------------------------------------------------------------------------------------------------------  Physical Exam:  Vitals: Blood pressure (!) 88/61, pulse 70, temperature 97 8 °F (36 6 °C), temperature source Axillary, resp  rate 18, height 5' 2" (1 575 m), weight 49 1 kg (108 lb 3 9 oz), SpO2 98 %  Neurologic Exam:  (Unchanged) Asleep  She arouses by moving slightly to light touch and loud voice, but does not fully awaken or become interactive  She quickly fell back asleep  Cranial nerves:   Unable to fully assess  Eyes were conjugate with some roving eye movements  Motor:   Normal tone throughout  She did not cooperate with strength testing  Sensation:  Responds to tactile stimulation      -------------------------------------------------------------------------------------------------    LAB & TEST RESULTS:  No new labs    -------------------------------------------------------------------------------------------------------------------  Assessment/Plan:    Mireille Mckeon is a 40 y o  woman with 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with intractable seizures and severe intellectual disability  She has continued to have multiple seizures despite her current medications, being on 6 seizure medications and having a VNS  1)  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  She is still quite sleepy on examination this morning, similar to yesterday  She does have periods of time when she is awake and more interactive later in the day  Her seizures again appear to be less frequent  Yesterday, her seizures seemed to improve after she got her Fycompa dose, which would suggest that this has been a helpful addition  I would prefer to keep her medications unchanged for now  We likely will not be able to eliminate her seizures while in the hospital, and she will need to continue to follow as an outpatient for ongoing management  Recommendations:  -- Continue EEG to better quantify her seizures   As long as her seizures continue to be improved by afternoon, will likely have her come off EEG    -- Continue clobazam 10 mg in the morning and 20 mg at night, Levetiracetam 1000 mg twice a day, Rufinamide 1600 mg twice a day, Valproate 250 mg three times a day, and Fycompa to 8 mg daily today  -- Continue Melatonin nightly  Would promote awakefulness during the day with all the lights on and appropriate stimulation to promote wakefulness during the day  At night, please have all lights off, TV off, and limit stimulation, especially with noise and light to try to promote sleep at night  -- Also continue Levocarnitine for elevated ammonia  Will tentatively plan for discharge back to her SNF tomorrow on her current meds   I will help assure she has Neurologic follow up with Dr Alexandra Swenson in a few weeks after discharge      -------------------------------------------------------------------------------------------------  Jodi Benjamin MD        Date: 10/25/2017     Time: 1:15 PM   [address] [facility]

## 2017-10-26 ENCOUNTER — GENERIC CONVERSION - ENCOUNTER (OUTPATIENT)
Dept: OTHER | Facility: OTHER | Age: 37
End: 2017-10-26

## 2017-10-26 VITALS
RESPIRATION RATE: 16 BRPM | BODY MASS INDEX: 19.68 KG/M2 | DIASTOLIC BLOOD PRESSURE: 52 MMHG | HEART RATE: 82 BPM | SYSTOLIC BLOOD PRESSURE: 82 MMHG | TEMPERATURE: 98.2 F | HEIGHT: 62 IN | WEIGHT: 106.92 LBS | OXYGEN SATURATION: 95 %

## 2017-10-26 LAB
LEVETIRACETAM SERPL-MCNC: 21.4 UG/ML (ref 10–40)
RUFINAMIDE SERPL-MCNC: 6.5 UG/ML

## 2017-10-26 RX ORDER — LEVOCARNITINE 1 G/10ML
500 SOLUTION ORAL 3 TIMES DAILY
Qty: 474 ML | Refills: 0 | Status: SHIPPED | OUTPATIENT
Start: 2017-10-26 | End: 2017-12-07 | Stop reason: HOSPADM

## 2017-10-26 RX ORDER — CLOBAZAM 20 MG/1
20 TABLET ORAL
Qty: 30 TABLET | Refills: 0 | Status: SHIPPED | OUTPATIENT
Start: 2017-10-26 | End: 2017-11-27 | Stop reason: HOSPADM

## 2017-10-26 RX ORDER — LANOLIN ALCOHOL/MO/W.PET/CERES
6 CREAM (GRAM) TOPICAL
Qty: 60 TABLET | Refills: 0 | Status: ON HOLD | OUTPATIENT
Start: 2017-10-26 | End: 2017-11-27

## 2017-10-26 RX ORDER — LEVETIRACETAM 100 MG/ML
1000 SOLUTION ORAL EVERY 12 HOURS SCHEDULED
Qty: 473 ML | Refills: 0 | Status: SHIPPED | OUTPATIENT
Start: 2017-10-26 | End: 2018-04-23 | Stop reason: SDUPTHER

## 2017-10-26 RX ORDER — CLOBAZAM 10 MG/1
10 TABLET ORAL EVERY MORNING
Qty: 30 TABLET | Refills: 0 | Status: SHIPPED | OUTPATIENT
Start: 2017-10-26 | End: 2017-11-27 | Stop reason: HOSPADM

## 2017-10-26 RX ADMIN — CLOBAZAM 10 MG: 10 TABLET ORAL at 08:36

## 2017-10-26 RX ADMIN — Medication 1 TABLET: at 08:35

## 2017-10-26 RX ADMIN — VALPROIC ACID 250 MG: 250 SOLUTION ORAL at 05:36

## 2017-10-26 RX ADMIN — LEVETIRACETAM 1000 MG: 100 SOLUTION ORAL at 08:36

## 2017-10-26 RX ADMIN — ENOXAPARIN SODIUM 40 MG: 40 INJECTION SUBCUTANEOUS at 08:33

## 2017-10-26 RX ADMIN — TOPIRAMATE 250 MG: 100 TABLET, FILM COATED ORAL at 08:35

## 2017-10-26 RX ADMIN — MINERAL OIL AND WHITE PETROLATUM 1 APPLICATION: 150; 830 OINTMENT OPHTHALMIC at 08:34

## 2017-10-26 RX ADMIN — OFLOXACIN 1 DROP: 3 SOLUTION OPHTHALMIC at 08:32

## 2017-10-26 RX ADMIN — LACTULOSE 30 G: 20 SOLUTION ORAL at 08:36

## 2017-10-26 RX ADMIN — RUFINAMIDE 1600 MG: 200 TABLET, FILM COATED ORAL at 08:34

## 2017-10-26 RX ADMIN — LEVOCARNITINE 500 MG: 1 SOLUTION ORAL at 08:36

## 2017-10-26 RX ADMIN — PERAMPANEL 8 MG: 4 TABLET ORAL at 08:50

## 2017-10-26 NOTE — SOCIAL WORK
[address] and spoke to [place] to confirm if Fycompa was ordered and at pharmacy  He stated that the medication was delivered to their pharmacy today and is ready to be filled once orders are received from facility  He stated that the staff at the pharmacy are aware to ensure medication is filled and sent as soon as prescriptions are received at the pharmacy from [facility]

## 2017-10-26 NOTE — PROGRESS NOTES
Epilepsy Consult Daily Progress Note   Harish Suarez 40 y o  female   : 1980  MRN: [id number]     Unit/Bed#: [facility] 715-01    Encounter: [id number]  -------------------------------------------------------------------------------------------------  Interval history:   She continues to be doing about the same as yesterday  She was able to come off EEG yesterday afternoon  Her aid noted maybe one brief episode of bilateral arm stiffening this morning, similar to her tonic seizures seen on EEG  She did wake a little this morning, drank some juice and had a little of her breakfast, but has since fallen back asleep      -------------------------------------------------------------------------------------------------  Past Medical history, surgical history, family history, and social history are unchanged from admission H&P     ROS:  Unable to assess from patient due to cognitive impairment    -------------------------------------------------------------------------------------------------------------------  Allergies:    Allergies   Allergen Reactions    Felbamate       Scheduled Meds:     artificial tear 1 application Ophthalmic TID   cloBAZam 10 mg Per G Tube Daily   cloBAZam 20 mg Per NG Tube HS   enoxaparin 40 mg Subcutaneous Daily   lactulose 30 g Per PEG Tube TID   levETIRAcetam 1,000 mg Per G Tube Q12H [address]   levOCARNitine 500 mg Per PEG Tube TID   melatonin 6 mg Oral HS   multivitamin-minerals 1 tablet Per G Tube Daily   ofloxacin 1 drop Both Eyes 4x Daily   Perampanel 8 mg Oral Daily   rufinamide 1,600 mg Per G Tube BID   topiramate 250 mg Per G Tube BID   Valproic Acid 250 mg Oral Q8H [address]     PRN Meds:   acetaminophen    bisacodyl    magnesium hydroxide    ondansetron    ondansetron  -------------------------------------------------------------------------------------------------------------------  Physical Exam:  Vitals: Blood pressure (!) 82/52, pulse 82, temperature 98 2 °F (36 8 °C), temperature source Oral, resp  rate 16, height 5' 2" (1 575 m), weight 48 5 kg (106 lb 14 8 oz), SpO2 95 %     -------------------------------------------------------------------------------------------------    LAB & TEST RESULTS:  No new labs    -------------------------------------------------------------------------------------------------------------------  Assessment/Plan:    Karyle Pillion is a 40 y o  woman with 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with intractable seizures and severe intellectual disability  She has continued to have multiple seizures despite her current medications, being on 6 seizure medications and having a VNS  1)  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  Overall, her seizures have improved over the course of this week, and she seems to have responded favorably to her Fycompa  She is still sleepy, but she has periods of time when she is awake, typically in the evenings  Both her seizures and sedation have been longstanding issues  We have seen that the Depakote and Onfi seem to be helpful for her seizures  Ultimately, she will need to continue to follow as an outpatient to continue to make gradual adjustments to her medications  Recommendations:  -- Continue clobazam 10 mg in the morning and 20 mg at night, Levetiracetam 1000 mg twice a day, Rufinamide 1600 mg twice a day, Valproate 250 mg three times a day, and Fycompa to 8 mg daily today  -- Continue Melatonin nightly  Would promote awakefulness during the day with all the lights on and appropriate stimulation to promote wakefulness during the day  At night, please have all lights off, TV off, and limit stimulation, especially with noise and light to try to promote sleep at night  -- Also continue Levocarnitine for elevated ammonia  She has improved significantly and any further changes will need to occur as an outpatient in follow up   It will be important to give her more time to adjust to her current medications and reassess her level of sedation in a few weeks and over the following months with other potential medication adjustments, to be determined as an outpatient       Will help arrange a follow up in a few weeks with Dr Elvi Grant    -------------------------------------------------------------------------------------------------  Arlene Welch MD        Date: 10/26/2017     Time: 11:04 AM   [facility] [facility] [place]

## 2017-10-26 NOTE — DISCHARGE SUMMARY
Discharge Summary - [address] Internal Medicine    Patient Information: Pam Rey 40 y o  female MRN: [id number]  Unit/Bed#: [facility] 715-01 Encounter: [id number]    Discharging Physician / Practitioner: Burgess Christian MD  PCP: Maday Alcocer DO  Admission Date: 10/10/2017  Discharge Date: 10/26/17    Reason for Admission: HCAP, seizures    Discharge Diagnoses:     Principal Problem:    HCAP (healthcare-associated pneumonia)  Active Problems:    Sepsis ([facility])    Seizure ([facility])    Dysphagia    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[facility])  Resolved Problems:    * No resolved hospital problems  *      Consultations During Hospital Stay:  · neurology    Procedures Performed:     · vEEG- multiple seizures    Significant Findings / Test Results:     ·     Incidental Findings:   ·      Test Results Pending at Discharge (will require follow up):   ·      Outpatient Tests Requested:  ·     Complications:      Hospital Course:     Pam Rey is a 40 y o  female patient who originally presented to the hospital on 10/10/2017 with pmhx of seizure, intellectual disability, PEG, was a transfer from [facility] for complaints of SOB  Patient found to have possible gram negative pneumonia and placed on IV abx  Patient also has recurrent seizures from Cano gastaut syndrome and placed on vEEG  Patient found to have multiple seizures on vEEG and neurology was readjusting patients seizure medications  Patient seizures have improved significantly over the last week  Patient will be discharged on clobazam 10mg in morning and 20mg at night, levetiracetam 1000mg BID, rufinamide 1600 mg BID, valproate 250mg TIC, and fycompa 8mg daily  Patient will need more time to observe the effects of her current medications and reassess her level of sedation in a few weeks and over the following months with other potential medication adjustments  Patient to followup with neurology, Dr Joelyn Essex as outpatient      Condition at Discharge: stable     Discharge Day Visit / Exam:     Subjective:  Patient seen and examined at bedside  Patient nonverbal   Vitals: Blood Pressure: (!) 82/52 (10/26/17 0714)  Pulse: 82 (10/26/17 0714)  Temperature: 98 2 °F (36 8 °C) (10/26/17 0714)  Temp Source: Oral (10/26/17 0714)  Respirations: 16 (10/26/17 0714)  Height: 5' 2" (157 5 cm) (noted per prior adm hx in [employer]) (10/11/17 1711)  Weight - Scale: 48 5 kg (106 lb 14 8 oz) (on bedscale with pads included) (10/25/17 1809)  SpO2: 95 % (10/26/17 0714)  Exam:   Physical Exam   HENT:   Head: Normocephalic and atraumatic  Eyes: Conjunctivae and EOM are normal  Pupils are equal, round, and reactive to light  Neck: Normal range of motion  Neck supple  No JVD present  No tracheal deviation present  No thyromegaly present  Cardiovascular: Normal rate, regular rhythm and normal heart sounds  Exam reveals no gallop and no friction rub  No murmur heard  Pulmonary/Chest: Effort normal and breath sounds normal  No respiratory distress  She has no wheezes  She has no rales  Abdominal: Soft  Bowel sounds are normal  She exhibits no distension  There is no tenderness  There is no rebound  +PEG   Musculoskeletal: Normal range of motion  She exhibits no edema  Neurological: She is alert  Vitals reviewed  Discussion with Family:     Discharge instructions/Information to patient and family:   See after visit summary for information provided to patient and family  Provisions for Follow-Up Care:  See after visit summary for information related to follow-up care and any pertinent home health orders  Disposition:     Other: SNF    For Discharges to [facility] SNF:   · Not Applicable to this Patient - Not Applicable to this Patient    Planned Readmission: none     Discharge Statement:  I spent 50 minutes discharging the patient  This time was spent on the day of discharge  I had direct contact with the patient on the day of discharge   Greater than 50% of the total time was spent examining patient, answering all patient questions, arranging and discussing plan of care with patient as well as directly providing post-discharge instructions  Additional time then spent on discharge activities  Discharge Medications:  See after visit summary for reconciled discharge medications provided to patient and family        ** Please Note: This note has been constructed using a voice recognition system **

## 2017-10-26 NOTE — PROGRESS NOTES
Assessment  1  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with tonic seizures (345 10) (G40 812)   2  Anoxic brain damage, not elsewhere classified (348 1) (G93 1)    Plan  Epilepsy undetermined as to focal or generalized, intractable    · From  LamoTRIgine 200 MG Oral Tablet Take 1 tablet twice daily (along with  one 100mg tab twice a day) To LamoTRIgine 200 MG Oral Tablet Take 1 tablet twice daily   Rx By: Vanda Cool; Dispense: 30 Days ; #:60 Tablet; Refill: 5;For: Epilepsy undetermined as to focal or generalized, intractable; TOBIAS = N; Print Rx  Epilepsy undetermined as to focal or generalized, intractable, 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with tonic seizures    · LamoTRIgine 100 MG Oral Tablet   Rx By: Joanna Dawn; Dispense: 30 Days ; #:60 Tablet; Refill: 5;For: Epilepsy undetermined as to focal or generalized, intractable, 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with tonic seizures; TOBIAS = N; Print Rx; Last Updated By: Vanda Cool; 9/11/2017 2:27:28 PM   · From  Valproic Acid 250 MG/5ML Oral Solution TAKE 5 ML Every twelve hours  To Valproic Acid 250 MG/5ML Oral Solution SWALLOW 5 ML Every 8 hours   Rx By: Vanda Cool; Dispense: 30 Days ; #:450 ML; Refill: 5;For: Epilepsy undetermined as to focal or generalized, intractable, 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with tonic seizures; TOBIAS = N; Print Rx  Intellectual disability    · (1) AMMONIA; Status:Active; Requested for:[id number];    Perform:[facility]; Due:76Trs9293; Ordered; For:Intellectual disability; Ordered By:Vidhya Kaiser;   · (1) COMPREHENSIVE METABOLIC PANEL; Status:Active; Requested for:88Hij0313;    Perform:[facility]; Due:75Rsb9719; Ordered; For:Intellectual disability; Ordered By:Vidhya Kaiser;   · (1) LAMICTAL; Status:Active; Requested for:57Obk7942;    Perform:[facility]; Due:16Dmy8987; Ordered; For:Intellectual disability; Ordered By:Vidhya Kaiser;   · (1) TOPIRAMATE; Status:Active;  Requested for:16Xsl3120;    Perform:[facility]; Due:11Sep2018; Ordered; For:Intellectual disability; Ordered By:Vidhya Kaiser;   · (1) VALPROIC ACID (DEPAKOTE); Status:Active; Requested for:11Sep2017;    Perform:[facility]; Due:57Pbe6953; Ordered; For:Intellectual disability; Ordered By:Vidhya Kaiser;   · (Q) CLOBAZAM (URBANYL); Status:Active; Requested for:11Sep2017;    Perform:[facility]; Due:11Njp2161; Ordered; For:Intellectual disability; Ordered By:Vidhya Kaiser;   · Follow-up visit in 3 months Evaluation and Treatment  Follow-up with Dr Misha Herbert  Status:  Prem Ponimmanuel for: 11Sep2017   Ordered; For: Intellectual disability; Ordered By: Stephanie Yañez Performed:  Due: 30ONM3058    Discussion/Summary  Ms Vaibhav Early is a 40year old woman with Lennox Gastaut Syndrome, remote history of anoxic brain injury, h/o status epilepticus, severe intellectual disability  She has a VNS due to intractable epilepsy  Seizures apparently continue to occur about daily (clusters of brief tonic seizures) and are thought to be stable (subjective, objective data lacking)  There were unintentional changes to medication doses after last visit that may have occurred during her admission to [address] [place] [address] Way in early July  I will have her increase lamotrigine to 200 mg bid and remain on her higher dose of valproate (250 mg q8h)  Her other medications will remain unchanged  VNS settings were not changed today  AED levels will be checked in about 2 weeks  Lamotrigine can be increased further if seizures continue, but will hold off on additional increase until levels are back  She will return to see Dr Misha Herbert in about 3 months  Discussion Summary:   Today's Plan: - continue with rufinamide (Banzel) 1600mg every 12 hours- continue with Onfi 20mg every 12 hours- continue with valproic acid 250mg every 8 hours- increase lamotrigine to 200 mg twice a day   - continue topiramate to 250mg (one each of 200mg and 50mg tabs) twice a day- Take levocarnitine 330mg tab every 8 hours (due to hyperammonia level)- Around 9/25, check, valproate, lamotrigine, topiramate, clobazam, CMP, and ammonia  Have results sent to Dr Leonel Hester - No changes to VNS today  Use magnet for seizures  Keep a seizure log  - follow-up in 3 months  - Let us know if seizures worsen  Counseling Documentation With Imm: The patient's caretaker was counseled regarding instructions for management,-- impressions,-- importance of compliance with treatment  Chief Complaint  Chief Complaint Free Text Note Form: Patient present for follow up appointment regarding epilepsy      History of Present Illness  Lucia Ho presents to the [facility] for follow up regarding Lennox Gastaut Syndrome  She was last seen on 6/5/2017  She follows with Dr Leonel Hester, but she was transported to the [facility] office today and I was able to see her so that she would not have to travel to [facility]  She arrives on a stretcher with an aide from her facility  See Dr Trip Carr 6/5/17 note for additional history  At last visit Lamotrigine was increased to 300 mg bid, topiramate was increased to 250 mg bid and VNS duty cycle was increased  Valproate was to be continued at 250 mg q12h  No changes were made to rufinamide or clobazam      Review of her medication list today reveals unexpected low lamotrigine dose of 150 mg bid and unexpectedly high valproate dose of 250 mg q8h  There are no phone call notes, nursing notes or hospital records to explain this medication change  I talked with Madyson Garcia the RN on her floor by phone and talked with an aide that accompanied the patient today  They feels seizures are likely stable  Clusters of tonic seizures continue to occur about daily they thick, but there doesn't seem to be a reliable log  The aide notes that there were seizures yesterday  She is awake most of the day and interactive  Sleep has been poor at night  There was an admission to [address] [facility] [facility] [address] 7/5-7/10/17 for sepsis   Both the initial medications in the ER note and the discharge instructions list the lamotrigine dose as 150 mg bid and valproate as 250 gm q8h  AED levels were not checked during the admission  The discharge summary notes that the patient appeared at her baseline with intermittent seizure activity  Labs since last visit:   6/26/17  Topiramate 8 5  Lamotrigine 8    7/12/17  Valproate 62  Ammonia 98    Seizure semiology:  1  She was described to have drop attacks or spells with grunting sounds and falling to the floor (none reported)  2  Her nurse commented on episodes of tonic seizures, eyes rolling up body tensing up with right arm raised up in the air (daily multiple seizures)  3   generalized convulsive seizures (last one February 2017 in the setting of missed doses of medications; another one in October 2016, when her topiramate level was undetected)  Special Features  Status epilepticus: yes  Self Injury Seizures: No  Precipitating Factors: menstrual cycle? ?    Current AEDs (EMR medication list reflects the meds as ordered at last visit, the below list is from her facility records): Valproate 250 mg q8h  Lamotrigine 150 mg bid  Rufinamide 1600 mg bid  Clobazam 20 mg bid  Topiramate 250 mg bid    Other medications include:  Lactulose 30 ml bid  Levocarnitine 100 mg tid    A full 10 system review was performed and is negative except for what is mentioned in the ROS section or in the HPI   ======================================================    Vagal Nerve Stimulator (VNS) interrogation was performed  Model: 105  Lead Impedence: OK      Output 2 00mA, Signal frequency 30Hz, Pulse Width 500 microsec, on time 21 sec, off time 0 8 minute  Mag setting Output 2 25mA, Signal on 60 sec, pulse width 500 microsec        Review of Systems  ROS Reviewed:   ROS reviewed  Active Problems  1  ADHD, predominantly inattentive type (314 00) (F90 0)   2  Anoxic brain damage, not elsewhere classified (348 1) (G93 1)   3  Elevated liver enzymes (790 5) (R74 8)   4  Encounter for postoperative care (V58 49) (Z48 89)   5  Epilepsy undetermined as to focal or generalized, intractable (345 91) (G40 919)   6  Excessive daytime sleepiness (780 54) (G47 19)   7  Hyperammonemia (270 6) (E72 20)   8  Hyperkeratosis (701 1) (L85 9)   9  Intellectual disability (319) (F79)   10  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with tonic seizures (345 10) (G40 812)   11  Lethargy (780 79) (R53 83)   12  On valproic acid therapy (V58 69) (Z79 899)   13  Onychomycosis (110 1) (B35 1)   14  Osteoporosis (733 00) (M81 0)   15  Surgery, elective (V50 9) (Z41 9)    Past Medical History  1  History of Clostridium difficile enteritis (008 45) (A04 7)  Active Problems And Past Medical History Reviewed: The active problems and past medical history were reviewed and updated today  Surgical History  1  History of Percutaneous Placement Of Gastrostomy Tube   2  History of Placement Of Intracranial Neurostimulator Pulse Generator    Family History  Mother    1  No pertinent family history    Social History   · Living In [address] [place] [address] Permanently   · Never A Smoker   · Never Drank Alcohol   · Never Used Drugs  Social History Reviewed: The social history was reviewed and updated today  Current Meds   1  Acetaminophen 325 MG Oral Tablet; TAKE 2 TABLET Every 6 hours PRN via G-Tube; Therapy: (Recorded:11Olq8887) to Recorded   2  Banzel 400 MG Oral Tablet; TAKE 4 TABLET Twice daily; Last Rx:07Apr2017 Ordered   3  Bisac-Evac 10 MG Rectal Suppository; INSERT 1 SUPPOSITORY RECTALLY AT   BEDTIME PRN; Therapy: (Recorded:65Snb1823) to Recorded   4  Certa-Javi LIQD; GIVE 1 TABLET VIA G-TUBE ONE TIME A DAY; Therapy: (Recorded:58Bcq3977) to Recorded   5  Fleet Bisacodyl 10 MG/30ML Rectal Enema; INSERT 1 APPLICATOR RECTALLY AS   NEEDED AT BEDTIME; Therapy: (Recorded:05Ntr8625) to Recorded   6   Lactulose 10 GM/15ML Oral Solution; GIVE 30ML VIA G-TUBE TWO TIMES A DAY; Therapy: 23XDM2335 to Recorded   7  LamoTRIgine 100 MG Oral Tablet; Take in conjunction with 200mg tablets; take 1 tab   twice a day; Last Rx:05Jun2017 Ordered   8  LamoTRIgine 200 MG Oral Tablet; Take 1 tablet twice daily (along with one 100mg tab   twice a day); Last Rx:05Jun2017 Ordered   9  LevOCARNitine (Dietary) 330 MG Oral Tablet; TAKE 1 TABLET Every 8 hours; Therapy: 07Apr2017 to (Evaluate:04Oct2017); Last Rx:07Apr2017 Ordered   10  Milk of Magnesia 7 75 % Oral Suspension; GIVE 30ML BY MOUTH AT BEDTIME AS    NEEDED; Therapy: (Recorded:11Sep2017) to Recorded   11  Ondansetron 4 MG Oral Tablet Disintegrating; TAKE 1 TABLET Every 8 hours PRN; Therapy: (Recorded:11Sep2017) to Recorded   12  Onfi 20 MG Oral Tablet; Take one tablet twice a day; Last Rx:07Apr2017 Ordered   13  Senexon TABS; TAKE 1 TABLET Daily via G-tube; Therapy: (Recorded:11Sep2017) to Recorded   14  Systane Nighttime Ophthalmic Ointment; INSTILL 1 GRAM IN BOTH EYES THREE TIMES    A DAY FOR DRY EYES;    Therapy: (Recorded:11Sep2017) to Recorded   15  Topiramate 200 MG Oral Tablet; TAKE 1 TABLET (with 50mg tab) TWICE DAILY; Last    Rx:05Jun2017 Ordered   16  Topiramate 50 MG Oral Tablet; Give by PEG 1 tab (with one 200mg tab) twice a day; Therapy: 50BGR3922 to (Evaluate:61Fhx9673); Last Rx:05Jun2017 Ordered   17  Valproic Acid 250 MG/5ML Oral Solution; TAKE 5 ML Every twelve hours; Therapy: 07Apr2017 to (Evaluate:04Oct2017); Last Rx:07Apr2017 Ordered    Allergies  1  Felbatol TABS    Vitals  Signs   Recorded: 11Sep2017 01:42PM   Height Unobtainable: Yes  Weight Unobtainable: Yes    Physical Exam  No distress  On stretching  Track a toy well to the left and right  Does not follow commands for me  Pushes the VNS device away  Intermittent vocalizations, but no clear words  Moves all extremities without clear evidence of asymmetry  Did not participate with getting vitals today            Signatures   Electronically signed by : PHYLLIS Mckinney ; Sep 11 2017  3:02PM EST                       (Author)

## 2017-10-26 NOTE — SOCIAL WORK
CM was informed that pt is medically stable for dc today  CM arranged with [facility] for a 12pm dc to [facility]  CM notified Luis Vargas at [facility], pts bedside RN Jade Mederos, and pts father Brigid Room [phone] of dc time  Facility transfer form and CMN completed  Chart copy requested

## 2017-10-26 NOTE — PROCEDURES
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 and frequent tonic seizures to evaluate for interval change after in seizure burden  Medications include:       -------------------------------------------------------------------------------------------------------------------   Description of Procedure:  32 channel digital recording with electrodes placed according to the International 10-20 system with additional T1/T2 electrodes, EOG, EKG, and simultaneous video  A monitoring technologist supervised the continuous recording  The recording was technically satisfactory  -------------------------------------------------------------------------------------------------------------------   Results:   Manual Review:   Manual review of the tracing was essentially unchanged from the prior day's recording  During wakefulness there were was no definite posteriorly dominant rhythm  Instead, there were diffuse low amplitude theta activities often near 5 cps, periods of relative suppression with very low amplitude mixed, predominantly anteriorly dominant beta activities  There were frequent independent right and left temporal spike wave discharges as well as generalized spike wave discharges   These generalized spike wave discharges often occurred in rhythmic bursts of 2-2 5 cps rhythmic generalized discharges that at times coorrelated with mild eyelid myoclonia  During periods of sleep, theta activities attenuated and there were diffuse, low amplitude beta activities  There continued to be frequent epileptiform discharges during sleep, as described above  Other findings:  Samples of the single channel ECG demonstrated a regular rhythm  Events/Seizures:   Although no push buttons were activated, there again were innumerable seizures seen throughout the study  Tonic seizures manifested as bilateral arm stiffening  Tonic-clonic seizures would begin with a tonic seizure, but would last longer and have clonic activity of both arms before abruptly stopping  There also were innumerable more subtle seizures that either had no clinical change or only subtle arm movements  All of these seizures were elctrographically similar, starting with a burst of generalized slow spike wave discharges at 2-2 5 cps that was followed by generalized attenuation of activity and low amplitude rhythmic, generalized beta activity that gradually increased in frequency and amplitude to be rhythmic alpha activity before abruptly attenuating  Additionally, there were very frequent prolonged bursts of generalized slow spike wave discharges without any clear clinical change that would last about 6-12 seconds  During samples of the tracin:00-09:00:  11 tonic and tonic-clonic seizures and 2 bursts of slow spike wave    10:00-11:00: 8 seizures with only subtle clinical changes  No tonic or tonic clonic seizures    12:00-13:00  4 seizures with only subtle clinical changes  5 bursts of slow spike wave    23:30 - 00:30  6 tonic seizures, 2 seizures with only subtle clinical changes, and 5 bursts of slow spike wave    07:00 - 08:00  6 bursts of slow spike wave no clinical seizures  -------------------------------------------------------------------------------------------------------------------   Interpretation:    This prolonged, continuous video-EEG recording is abnormal  Innumerable clinical and electrographic seizures (mostly tonic, tonic-clonic, or subtle tonic seizures) were captured, similar to the prior days studies with samples listed above  Background disorganization, theta frequency slowing and frequent generalized as well as independent left and right temporal epileptiform discharges are consistent with the diagnosis of Lennox Gastaut syndrome       Ann Jin MD   [address] [facility]

## 2017-10-27 NOTE — PROCEDURES
Continuous Video EEG Monitoring       Patient Name:  Becky Virk  MRN: [id number]   :  1980 File #: [id number]   Age: 40 y o  Encounter #: [id number]   Start Time: 10/24/2017 0800 End Time: 10/25/2017 08:00        Report date: 10/27/2017          Study type: Continuous video EEG    ICD 10 diagnosis: Generalized epilepsy intractable with status G40 311 and Lennox Gastaut syndrome    -------------------------------------------------------------------------------------------------------------------   Patient History: This recording was observed in a 40 y o  female with Serge Ball Syndrome and frequent tonic seizures to evaluate for interval change after in seizure burden  Medications include:   artificial tear 1 application Ophthalmic TID   cloBAZam 10 mg Per G Tube Daily   cloBAZam 20 mg Per NG Tube HS   enoxaparin 40 mg Subcutaneous Daily   lactulose 30 g Per PEG Tube TID   levETIRAcetam 1,000 mg Per G Tube Q12H [address]   levOCARNitine 500 mg Per PEG Tube TID   melatonin 6 mg Oral HS   multivitamin-minerals 1 tablet Per G Tube Daily   ofloxacin 1 drop Both Eyes 4x Daily   Perampanel 8 mg Oral Daily   rufinamide 1,600 mg Per G Tube BID   topiramate 250 mg Per G Tube BID   Valproic Acid 250 mg Oral Q8H [address]         -------------------------------------------------------------------------------------------------------------------   Description of Procedure:  32 channel digital recording with electrodes placed according to the International 10-20 system with additional T1/T2 electrodes, EOG, EKG, and simultaneous video  A monitoring technologist supervised the continuous recording  The recording was technically satisfactory  -------------------------------------------------------------------------------------------------------------------   Results:   Manual Review:   Manual review of the tracing was essentially unchanged from the prior day's recording       During wakefulness there were was no definite posteriorly dominant rhythm  Instead, there were diffuse low amplitude theta activities often near 5 cps, periods of relative suppression with very low amplitude mixed, predominantly anteriorly dominant beta activities  There were frequent independent right and left temporal spike wave discharges as well as generalized spike wave discharges  These generalized spike wave discharges often occurred in rhythmic bursts of 2-2 5 cps rhythmic generalized discharges that at times coorrelated with mild eyelid myoclonia  During periods of sleep, theta activities attenuated and there were diffuse, low amplitude beta activities  There continued to be frequent epileptiform discharges during sleep, as described above  Other findings:  Samples of the single channel ECG demonstrated a regular rhythm  Events/Seizures:   Although no push buttons were activated, there again were numerous seizures seen throughout the study  Tonic seizures manifested as bilateral arm stiffening  Tonic-clonic seizures would begin with a tonic seizure, but would last longer and have clonic activity of both arms before abruptly stopping  There also were innumerable more subtle seizures that either had no clinical change or only subtle arm movements  All of these seizures were elctrographically similar, starting with a burst of generalized slow spike wave discharges at 2-2 5 cps that was followed by generalized attenuation of activity and low amplitude rhythmic, generalized beta activity that gradually increased in frequency and amplitude to be rhythmic alpha activity before abruptly attenuating  Additionally, there were very frequent prolonged bursts of generalized slow spike wave discharges without any clear clinical change that would last about 6-12 seconds      During samples of the tracin:00-09:00:  2 tonic and tonic-clonic seizures, 1 seizure with subtle clinical changes, and 4 bursts of slow spike wave    10:00-11:00: 4 tonic seizures, no subtle seizures or bursts of slow spike wave  12:00-13:00  No seizures, one burst of slow spike wave    23:30 - 00:30  1 seizures with only subtle clinical changes, and 9 bursts of slow spike wave    07:00 - 08:00  1 bursts of slow spike wave and two seizures with only subtle clinical changes    -------------------------------------------------------------------------------------------------------------------   Interpretation: This prolonged, continuous video-EEG recording is abnormal  Numerous clinical and electrographic seizures (mostly tonic, tonic-clonic, or subtle tonic seizures) were captured, overall decreased in frequency compared to the prior days recording with samples listed above  Background disorganization, theta frequency slowing and frequent generalized as well as independent left and right temporal epileptiform discharges are consistent with the diagnosis of Lennox Gastaut syndrome       Arlene Welch MD   [address] [facility]

## 2017-10-30 NOTE — PROCEDURES
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 and frequent tonic seizures to evaluate for interval change after in seizure burden  Medications include:   artificial tear 1 application Ophthalmic TID   cloBAZam 10 mg Per G Tube Daily   cloBAZam 20 mg Per NG Tube HS   enoxaparin 40 mg Subcutaneous Daily   lactulose 30 g Per PEG Tube TID   levETIRAcetam 1,000 mg Per G Tube Q12H [facility]   levOCARNitine 500 mg Per PEG Tube TID   melatonin 6 mg Oral HS   multivitamin-minerals 1 tablet Per G Tube Daily   ofloxacin 1 drop Both Eyes 4x Daily   Perampanel 8 mg Oral Daily   rufinamide 1,600 mg Per G Tube BID   topiramate 250 mg Per G Tube BID   Valproic Acid 250 mg Oral Q8H [facility]         -------------------------------------------------------------------------------------------------------------------   Description of Procedure:  32 channel digital recording with electrodes placed according to the International 10-20 system with additional T1/T2 electrodes, EOG, EKG, and simultaneous video  A monitoring technologist supervised the continuous recording  The recording was technically satisfactory  -------------------------------------------------------------------------------------------------------------------   Results:   Manual Review:   Manual review of the tracing was essentially unchanged from the prior day's recording       During wakefulness there were was no definite posteriorly dominant rhythm  Instead, there were diffuse low amplitude theta activities often near 5 cps, periods of relative suppression with very low amplitude mixed, predominantly anteriorly dominant beta activities  There were frequent independent right and left temporal spike wave discharges as well as generalized spike wave discharges  These generalized spike wave discharges often occurred in rhythmic bursts of 2-2 5 cps rhythmic generalized discharges that at times coorrelated with mild eyelid myoclonia  During periods of sleep, theta activities attenuated and there were diffuse, low amplitude beta activities  There continued to be frequent epileptiform discharges during sleep, as described above, and bursts of paroxysmal fast activities  Other findings:  Samples of the single channel ECG demonstrated a regular rhythm  Events/Seizures:   Although no push buttons were activated, there again were multiple seizures seen throughout the study  No tonic seizures or tonic-clonic seizures were seen  There continued to be subtle seizures that either had no clinical change or only subtle arm movements  All of these seizures were elctrographically similar, starting with a burst of generalized slow spike wave discharges at 2-2 5 cps that was followed by generalized attenuation of activity and low amplitude rhythmic, generalized beta activity that gradually increased in frequency and amplitude to be rhythmic alpha activity before abruptly attenuating  Additionally, there were very frequent prolonged bursts of generalized slow spike wave discharges without any clear clinical change that would last about 6-12 seconds  During samples of the tracin:00-09:00:  No tonic and tonic-clonic seizures, 7 seizures with subtle clinical changes, and 4 bursts of slow spike wave    10:00-11:00: No tonic seizures, one subtle seizure, No bursts of slow spike wave  12:00-13:00   No seizures, no bursts of slow spike wave    -------------------------------------------------------------------------------------------------------------------   Interpretation: This prolonged, continuous video-EEG recording is abnormal  Multiple subtle clinical and electrographic seizures (with subtle arm movements) were captured, overall decreased in frequency compared to the prior days recording with samples listed above  Background disorganization, theta frequency slowing and frequent generalized as well as independent left and right temporal epileptiform discharges are consistent with the diagnosis of Lennox Gastaut syndrome       Mis Barrett MD   [address] [facility]

## 2017-11-07 ENCOUNTER — ALLSCRIPTS OFFICE VISIT (OUTPATIENT)
Dept: OTHER | Facility: OTHER | Age: 37
End: 2017-11-07

## 2017-11-17 ENCOUNTER — HOSPITAL ENCOUNTER (EMERGENCY)
Facility: HOSPITAL | Age: 37
Discharge: NURSING, REHAB OR LONG-TERM CARE | End: 2017-11-17
Attending: EMERGENCY MEDICINE | Admitting: EMERGENCY MEDICINE
Payer: MEDICARE

## 2017-11-17 ENCOUNTER — APPOINTMENT (EMERGENCY)
Dept: RADIOLOGY | Facility: HOSPITAL | Age: 37
End: 2017-11-17
Payer: MEDICARE

## 2017-11-17 VITALS
RESPIRATION RATE: 14 BRPM | TEMPERATURE: 98.9 F | HEART RATE: 70 BPM | DIASTOLIC BLOOD PRESSURE: 72 MMHG | WEIGHT: 114.5 LBS | SYSTOLIC BLOOD PRESSURE: 107 MMHG | BODY MASS INDEX: 20.94 KG/M2 | OXYGEN SATURATION: 100 %

## 2017-11-17 DIAGNOSIS — T85.598A FEEDING TUBE DYSFUNCTION, INITIAL ENCOUNTER: Primary | ICD-10-CM

## 2017-11-17 PROCEDURE — 99283 EMERGENCY DEPT VISIT LOW MDM: CPT

## 2017-11-17 PROCEDURE — 74000 HB X-RAY EXAM OF ABDOMEN (SINGLE ANTEROPOSTERIOR VIEW): CPT

## 2017-11-17 RX ADMIN — IOHEXOL 50 ML: 240 INJECTION, SOLUTION INTRATHECAL; INTRAVASCULAR; INTRAVENOUS; ORAL at 20:43

## 2017-11-18 NOTE — DISCHARGE INSTRUCTIONS
How to Use and Care for Your PEG Tube   WHAT YOU NEED TO KNOW:   A percutaneous endoscopic gastrostomy (PEG) tube is a plastic tube that is put into your stomach through your skin  PEG tubes are most often used to give you food or liquids if you are not able to eat or drink  Medical formula, medicines, and water can be given through a PEG tube  DISCHARGE INSTRUCTIONS:   Return to the emergency department if:   · You start coughing or vomiting during or after a feeding  · You have severe abdominal pain  · Blood or tube feeding fluid leaks from the PEG tube site  · Your PEG tube is shorter than it was when it was put in     · Your PEG tube comes out  · Your mouth feels dry, your heart feels like it is beating too fast, or you feel weak  Contact your healthcare provider if:   · You have nausea, diarrhea, or abdominal bloating or discomfort  · You have stomach pain after each feeding or when you move around  · You have discomfort or pain around your PEG tube site  · The skin around your PEG tube is red, swollen, or draining pus  · You weigh less than your healthcare provider says you should  · Your PEG tube is longer than it was when it was put in     · You have questions or concerns about your condition or care  How to use your PEG tube: Your healthcare provider will tell you when and how often to use your PEG tube for feedings  You may need a bolus, intermittent, or continuous feeding  A bolus feeding is when formula is give over a short period of time  An intermittent feeding is scheduled for certain times throughout the day  Continuous feedings run all the time  Ask your healthcare provider which method is best for you:  · Use a feeding syringe  Connect the feeding syringe to the end of the PEG tube  Pour the correct amount of formula into the syringe  Hold the syringe up high  Formula will flow into the PEG tube   The syringe plunger may be used to gently push the last of the formula through the PEG tube  · Use a gravity drip bag  Connect the tubing from the gravity drip bag to the end of the PEG tube  Pour the formula into a gravity drip bag  Hang the bag on a medical pole, a , or other device  Adjust the flow rate on the tubing according to your healthcare provider's instructions  Formula will flow into the PEG tube  Ask how long it should take to complete your feeding  · Use a feeding pump  You may use an electric pump to control the flow of the formula into your PEG tube  Your healthcare provider will teach you how to set up and use the pump  How to care for your PEG tube:   · Always flush your PEG tube before and after each use  This helps prevent blockage from formula or medicine  Use at least 2 tablespoons (30 milliliters) of water to flush the tube  Follow directions for flushing your PEG tube  · If your PEG tube becomes clogged, try to unclog it as soon as you can  Flush your PEG tube with a 60 milliliter (mL) syringe filled with warm water  Never use a wire to unclog the tube  A wire can poke a hole in the tube  Your healthcare provider may have you use a special medicine or a plastic brush to help unclog your tube  · Check the PEG tube daily  ¨ Check the length of the tube from the end to where it goes into your body  If it gets longer, it may be at risk for coming out  If it gets shorter, let your healthcare provider know right away  ¨ Check the bumper  (piece that goes around the tube, next to your skin)  It should be snug against your skin  Tell your healthcare provider if the bumper seems too tight or too loose  · Use an alcohol pad to clean the end of your PEG tube  Do this before you connect tubing or a syringe to your PEG tube and after you remove it  When you disconnect tubing or a syringe from your PEG tube, do not let the end of the PEG tube touch anything  How do I care for the skin around my PEG tube?    · Do not remove the stitches or medical tape that hold your PEG tube in place  when you first get it  Your healthcare provider will take them off once the skin around your tube heals  Leave clean bandages over the tube area for the first 24 hours after the tube is put in  You may not need to use bandages after 24 hours if the skin around the tube looks dry  Ask when you can shower or bathe  · Routine skin care:      ¨ Clean the skin around your tube 1 to 2 times each day  Ask your healthcare provider what you should use to clean your skin  Check for redness and swelling in the area where the tube goes into your body  Check for fluid draining from your stoma (the hole where the tube was put in)  ¨ Gently turn your tube daily  after your stitches come out  This may decrease pressure on your skin under the bumper  It may also help prevent an infection  ¨ Keep the skin around your PEG tube dry  This will help prevent skin irritation and infection  · Use topical medicines as directed  You may need to put antibiotic cream on the skin around your tube after you are done cleaning it  Other tips to know about a PEG tube:   · You may need to keep track of how much formula and other liquids you have each day  You may also need to keep track of how much you urinate and how many times you have a bowel movement each day  Bring this record to your follow-up visits  · You may need to check your weight daily or weekly  Keep a record of your weights and bring it to your follow-up visits  Your healthcare provider may need to change your feedings if your weight changes too quickly  · Take your medicines as directed  Learn which of your medicines can be crushed, mixed with water, and given through the PEG tube  Certain medicines should not be crushed or may clog the PEG tube  · Go to all follow-up appointments  You may need to have blood tests and other tests when you see your healthcare provider    © 2017 [facility] [address] is for End User's use only and may not be sold, redistributed or otherwise used for commercial purposes  All illustrations and images included in CareNotes® are the copyrighted property of A D A M [employer]  or Jason Chiu  The above information is an  only  It is not intended as medical advice for individual conditions or treatments  Talk to your doctor, nurse or pharmacist before following any medical regimen to see if it is safe and effective for you

## 2017-11-18 NOTE — ED PROVIDER NOTES
History  Chief Complaint   Patient presents with    Feeding Tube Change     pt had removed her feeding tube at [address]  accompanied by aid  18Fr  91mL     36-year-old female with history of significant MRCP, residing at a nursing home, presents to the emergency department him pulled out her feeding tube  Feeding tube is present with the patient, found to be an 18 gauge, we do have a similar device here  Patient's trach is not new having been there for several years and leave the fistulous tract to be easily cannulated  The area was cleaned appropriately, a new gastric tube was placed through the trach with light pressure  Once entering the stomach was able to aspirate gastric contents  A single tubogram was done at bedside  A large amount of contrast is seen inside the stomach  The patient had the feeding tube balloon was inflated with 10cc and secured with an elastic belt the patient was discharged back to the nursing facility            History provided by:  EMS personnel and nursing home   used: No    Feeding Tube Change   Location:  Abdomen  Severity:  Mild  Onset quality:  Sudden  Timing:  Constant  Progression:  Worsening  Chronicity:  New  Associated symptoms: no abdominal pain, no chest pain, no diarrhea, no ear pain, no fatigue, no fever, no loss of consciousness, no myalgias, no rash, no rhinorrhea, no shortness of breath and no sore throat        Prior to Admission Medications   Prescriptions Last Dose Informant Patient Reported? Taking?    Multiple Vitamins-Minerals (CENTRUM SILVER PO)   Yes No   Si tablet by Per G Tube route daily   Perampanel 4 MG tablet   No No   Sig: Take 2 tablets by mouth daily for 30 days   Probiotic Product (ALEXANDER-BID PROBIOTIC PO)   Yes No   Sig: Take by mouth   Rufinamide (BANZEL PO)   Yes No   Si,600 mg by Per G Tube route 2 (two) times a day     Sennosides-Docusate Sodium (SENEXON-S PO)   Yes No   Si tablet by Per G Tube route daily     Valproate Sodium (VALPROIC ACID) 250 MG/5ML SOLN   Yes No   Si mL by Per G Tube route every 8 (eight) hours     White Petrolatum-Mineral Oil (SYSTANE NIGHTTIME) OINT   Yes No   Sig: Apply 1 application to eye 3 (three) times a day Both eyes    acetaminophen (TYLENOL) 325 mg tablet   Yes No   Si mg by Per G Tube route every 6 (six) hours as needed for mild pain or fever     bisacodyl (BISAC-EVAC) 10 mg suppository   Yes No   Sig: Insert 10 mg into the rectum as needed for constipation Indications: if no BM on day 4    cloBAZam (ONFI) tablet   No No   Si tablet by Per NG Tube route daily at bedtime for 30 days   cloBAZam (ONFI) tablet   No No   Si tablet by Per G Tube route every morning for 30 days   lactulose (CEPHULAC) 10 G packet   Yes No   Si mL by Per G Tube route 2 (two) times a day     levETIRAcetam (KEPPRA) 100 mg/mL oral solution   No No   Sig: 10 mL by Per G Tube route every 12 (twelve) hours for 30 days   levOCARNitine (CARNITOR) 1 g/10 mL solution   No No   Si mL by Per G Tube route 3 (three) times a day for 30 days   magnesium hydroxide (MILK OF MAGNESIA) 400 mg/5 mL oral suspension   Yes No   Si mL by Per G Tube route as needed for constipation     melatonin 3 mg   No No   Sig: Take 2 tablets by mouth daily at bedtime for 30 days   ofloxacin (OCUFLOX) 0 3 % ophthalmic solution   Yes No   Si drop 4 (four) times a day   ondansetron (ZOFRAN-ODT) 4 mg disintegrating tablet   No No   Sig: Take 1 tablet by mouth every 8 (eight) hours as needed for nausea or vomiting   sodium phosphate (FLEET) enema   Yes No   Sig: Insert 1 enema into the rectum as needed Indications: if no BM on day 5  follow package directions   topiramate (TOPAMAX) 200 MG tablet   Yes No   Si mg by Per G Tube route 2 (two) times a day        Facility-Administered Medications: None       Past Medical History:   Diagnosis Date    ADHD     Anoxic brain damage ([facility] [address] )     Autistic disorder     Epilepsy ([facility] [address] )     Hyperammonemia (HCC)     Hyperkeratosis     Hypotension     Hypotension     Intellectual disability     Intellectual disability     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with tonic seizures (HCC)     Lethargy     Liver enzyme elevation     Onychomycosis     Osteoporosis     Osteoporosis     Psychiatric disorder     anxiety    Seizures (HCC)        Past Surgical History:   Procedure Laterality Date    CARDIAC PACEMAKER PLACEMENT      JEJUNOSTOMY FEEDING TUBE      PEG TUBE PLACEMENT         History reviewed  No pertinent family history  I have reviewed and agree with the history as documented  Social History   Substance Use Topics    Smoking status: Never Smoker    Smokeless tobacco: Never Used    Alcohol use No        Review of Systems   Constitutional: Negative for fatigue and fever  HENT: Negative for ear pain, rhinorrhea and sore throat  Respiratory: Negative for shortness of breath  Cardiovascular: Negative for chest pain  Gastrointestinal: Negative for abdominal pain and diarrhea  Musculoskeletal: Negative for myalgias  Skin: Negative for rash  Neurological: Negative for loss of consciousness  All other systems reviewed and are negative  Physical Exam  ED Triage Vitals [11/17/17 2027]   Temperature Pulse Respirations Blood Pressure SpO2   98 9 °F (37 2 °C) 70 14 107/72 100 %      Temp Source Heart Rate Source Patient Position - Orthostatic VS BP Location FiO2 (%)   Temporal Monitor Lying Right arm --      Pain Score       --           Orthostatic Vital Signs  Vitals:    11/17/17 2027   BP: 107/72   Pulse: 70   Patient Position - Orthostatic VS: Lying       Physical Exam   Constitutional: She is oriented to person, place, and time  She appears well-developed and well-nourished  HENT:   Head: Normocephalic and atraumatic     Right Ear: External ear normal    Left Ear: External ear normal    Eyes: Conjunctivae and EOM are normal    Neck: No JVD present  No tracheal deviation present  No thyromegaly present  Cardiovascular: Normal rate  Pulmonary/Chest: Effort normal and breath sounds normal  No stridor  Abdominal: Soft  She exhibits no distension and no mass  There is no tenderness  There is no guarding  No hernia  Musculoskeletal: Normal range of motion  She exhibits no edema, tenderness or deformity  Lymphadenopathy:     She has no cervical adenopathy  Neurological: She is alert and oriented to person, place, and time  Skin: Skin is warm  No rash noted  No erythema  No pallor  Psychiatric: She has a normal mood and affect  Her behavior is normal    Nursing note and vitals reviewed  ED Medications  Medications   iohexol (OMNIPAQUE) 240 MG/ML solution 50 mL (50 mL Intravenous Given 11/17/17 2043)       Diagnostic Studies  Results Reviewed     None                 XR abdomen 1 view kub    (Results Pending)              Procedures  Procedures       Phone Contacts  ED Phone Contact    ED Course  ED Course                                MDM  Number of Diagnoses or Management Options  Feeding tube dysfunction, initial encounter: new and requires workup  Diagnosis management comments: There contrast outlining the stomach         Amount and/or Complexity of Data Reviewed  Tests in the radiology section of CPT®: reviewed and ordered  Decide to obtain previous medical records or to obtain history from someone other than the patient: yes  Review and summarize past medical records: yes    Patient Progress  Patient progress: stable    CritCare Time    Disposition  Final diagnoses:   Feeding tube dysfunction, initial encounter - replaced at bedside by me     Time reflects when diagnosis was documented in both MDM as applicable and the Disposition within this note     Time User Action Codes Description Comment    11/17/2017  8:40 PM Jailene Goss Add [P75 865M] Obstruction of feeding tube, initial encounter     11/17/2017  8:40 PM Jailene Goss Remove [T85 598A] Obstruction of feeding tube, initial encounter     11/17/2017  8:40 PM Monica Thacker Add [T85 598A] Feeding tube dysfunction, initial encounter     11/17/2017  8:40 PM Monica Thacker Modify [G39 082T] Feeding tube dysfunction, initial encounter replaced at bedside by me      ED Disposition     ED Disposition Condition Comment    Discharge  Gabriel [facility]OTT[facility] discharge to home/self care      Condition at discharge: Good        Follow-up Information     Follow up With Specialties Details Why [address] [facility], [address] [place] [address]   [address] [facility] [address]  [phone]          Discharge Medication List as of 11/17/2017  8:41 PM      CONTINUE these medications which have NOT CHANGED    Details   acetaminophen (TYLENOL) 325 mg tablet 650 mg by Per G Tube route every 6 (six) hours as needed for mild pain or fever  , Historical Med      bisacodyl (BISAC-EVAC) 10 mg suppository Insert 10 mg into the rectum as needed for constipation Indications: if no BM on day 4 , Until Discontinued, Historical Med      !! cloBAZam (ONFI) tablet 1 tablet by Per NG Tube route daily at bedtime for 30 days, Starting Thu 10/26/2017, Until Sat 11/25/2017, Print      !! cloBAZam (ONFI) tablet 1 tablet by Per G Tube route every morning for 30 days, Starting Thu 10/26/2017, Until Sat 11/25/2017, Print      lactulose (CEPHULAC) 10 G packet 30 mL by Per G Tube route 2 (two) times a day  , Historical Med      levETIRAcetam (KEPPRA) 100 mg/mL oral solution 10 mL by Per G Tube route every 12 (twelve) hours for 30 days, Starting Thu 10/26/2017, Until Sat 11/25/2017, Print      levOCARNitine (CARNITOR) 1 g/10 mL solution 5 mL by Per G Tube route 3 (three) times a day for 30 days, Starting Thu 10/26/2017, Until Sat 11/25/2017, Print      magnesium hydroxide (MILK OF MAGNESIA) 400 mg/5 mL oral suspension 30 mL by Per G Tube route as needed for constipation  , Until Discontinued, Historical Med      melatonin 3 mg Take 2 tablets by mouth daily at bedtime for 30 days, Starting Thu 10/26/2017, Until Sat 11/25/2017, Print      Multiple Vitamins-Minerals (CENTRUM SILVER PO) 1 tablet by Per G Tube route daily, Until Discontinued, Historical Med      ofloxacin (OCUFLOX) 0 3 % ophthalmic solution 1 drop 4 (four) times a day, Historical Med      ondansetron (ZOFRAN-ODT) 4 mg disintegrating tablet Take 1 tablet by mouth every 8 (eight) hours as needed for nausea or vomiting, Starting 6/9/2017, Until Discontinued, Print      Perampanel 4 MG tablet Take 2 tablets by mouth daily for 30 days, Starting Fri 10/27/2017, Until Sun 11/26/2017, Print      Probiotic Product (ALEXANDER-BID PROBIOTIC PO) Take by mouth, Until Discontinued, Historical Med      Rufinamide (BANZEL PO) 1,600 mg by Per G Tube route 2 (two) times a day  , Until Discontinued, Historical Med      Sennosides-Docusate Sodium (SENEXON-S PO) 1 tablet by Per G Tube route daily  , Until Discontinued, Historical Med      sodium phosphate (FLEET) enema Insert 1 enema into the rectum as needed Indications: if no BM on day 5  follow package directions, Until Discontinued, Historical Med      topiramate (TOPAMAX) 200 MG tablet 250 mg by Per G Tube route 2 (two) times a day  , Until Discontinued, Historical Med      Valproate Sodium (VALPROIC ACID) 250 MG/5ML SOLN 5 mL by Per G Tube route every 8 (eight) hours  , Until Discontinued, Historical Med      White Petrolatum-Mineral Oil (SYSTANE NIGHTTIME) OINT Apply 1 application to eye 3 (three) times a day Both eyes , Until Discontinued, Historical Med       !! - Potential duplicate medications found  Please discuss with provider  No discharge procedures on file      ED Provider  Electronically Signed by           Isra Padilla, DO  11/17/17 [address] [facility] [address], DO  11/17/17 2444

## 2017-11-22 ENCOUNTER — HOSPITAL ENCOUNTER (INPATIENT)
Facility: HOSPITAL | Age: 37
LOS: 5 days | Discharge: NURSING, REHAB OR LONG-TERM CARE | DRG: 081 | End: 2017-11-27
Attending: INTERNAL MEDICINE | Admitting: INTERNAL MEDICINE
Payer: MEDICARE

## 2017-11-22 ENCOUNTER — HOSPITAL ENCOUNTER (EMERGENCY)
Facility: HOSPITAL | Age: 37
End: 2017-11-22
Attending: EMERGENCY MEDICINE | Admitting: EMERGENCY MEDICINE
Payer: MEDICARE

## 2017-11-22 ENCOUNTER — APPOINTMENT (INPATIENT)
Dept: NEUROLOGY | Facility: AMBULATORY SURGERY CENTER | Age: 37
DRG: 081 | End: 2017-11-22
Payer: MEDICARE

## 2017-11-22 ENCOUNTER — APPOINTMENT (EMERGENCY)
Dept: RADIOLOGY | Facility: HOSPITAL | Age: 37
End: 2017-11-22
Payer: MEDICARE

## 2017-11-22 ENCOUNTER — APPOINTMENT (EMERGENCY)
Dept: CT IMAGING | Facility: HOSPITAL | Age: 37
End: 2017-11-22
Payer: MEDICARE

## 2017-11-22 VITALS
WEIGHT: 108.25 LBS | TEMPERATURE: 98 F | RESPIRATION RATE: 18 BRPM | OXYGEN SATURATION: 95 % | SYSTOLIC BLOOD PRESSURE: 95 MMHG | BODY MASS INDEX: 20.44 KG/M2 | HEIGHT: 61 IN | HEART RATE: 80 BPM | DIASTOLIC BLOOD PRESSURE: 64 MMHG

## 2017-11-22 DIAGNOSIS — R56.9 SEIZURE (HCC): Primary | ICD-10-CM

## 2017-11-22 DIAGNOSIS — H65.92 MIDDLE EAR EFFUSION, LEFT: ICD-10-CM

## 2017-11-22 DIAGNOSIS — E46 PROTEIN CALORIE MALNUTRITION (HCC): ICD-10-CM

## 2017-11-22 DIAGNOSIS — E72.20 HYPERAMMONEMIA (HCC): ICD-10-CM

## 2017-11-22 DIAGNOSIS — R41.82 ALTERED MENTAL STATUS: Primary | ICD-10-CM

## 2017-11-22 PROBLEM — R79.89 INCREASED AMMONIA LEVEL: Status: ACTIVE | Noted: 2017-11-22

## 2017-11-22 PROBLEM — R40.0 SOMNOLENCE: Status: ACTIVE | Noted: 2017-11-22

## 2017-11-22 PROBLEM — F79 INTELLECTUAL DISABILITY: Status: ACTIVE | Noted: 2017-11-22

## 2017-11-22 LAB
ALBUMIN SERPL BCP-MCNC: 3 G/DL (ref 3.5–5)
ALP SERPL-CCNC: 87 U/L (ref 46–116)
ALT SERPL W P-5'-P-CCNC: 31 U/L (ref 12–78)
AMMONIA PLAS-SCNC: 75 UMOL/L (ref 11–35)
ANION GAP SERPL CALCULATED.3IONS-SCNC: 4 MMOL/L (ref 4–13)
APTT PPP: 29 SECONDS (ref 23–35)
AST SERPL W P-5'-P-CCNC: 16 U/L (ref 5–45)
ATRIAL RATE: 81 BPM
BASOPHILS # BLD AUTO: 0.02 THOUSANDS/ΜL (ref 0–0.1)
BASOPHILS NFR BLD AUTO: 0 % (ref 0–1)
BILIRUB SERPL-MCNC: 0.1 MG/DL (ref 0.2–1)
BILIRUB UR QL STRIP: NEGATIVE
BUN SERPL-MCNC: 9 MG/DL (ref 5–25)
CALCIUM SERPL-MCNC: 9.2 MG/DL (ref 8.3–10.1)
CHLORIDE SERPL-SCNC: 104 MMOL/L (ref 100–108)
CLARITY UR: CLEAR
CO2 SERPL-SCNC: 31 MMOL/L (ref 21–32)
COLOR UR: ABNORMAL
CREAT SERPL-MCNC: 0.47 MG/DL (ref 0.6–1.3)
EOSINOPHIL # BLD AUTO: 0.13 THOUSAND/ΜL (ref 0–0.61)
EOSINOPHIL NFR BLD AUTO: 2 % (ref 0–6)
ERYTHROCYTE [DISTWIDTH] IN BLOOD BY AUTOMATED COUNT: 12.3 % (ref 11.6–15.1)
GFR SERPL CREATININE-BSD FRML MDRD: 127 ML/MIN/1.73SQ M
GLUCOSE SERPL-MCNC: 161 MG/DL (ref 65–140)
GLUCOSE SERPL-MCNC: 162 MG/DL (ref 65–140)
GLUCOSE UR STRIP-MCNC: NEGATIVE MG/DL
HCT VFR BLD AUTO: 40.2 % (ref 34.8–46.1)
HGB BLD-MCNC: 13.2 G/DL (ref 11.5–15.4)
HGB UR QL STRIP.AUTO: NEGATIVE
INR PPP: 1.03 (ref 0.86–1.16)
KETONES UR STRIP-MCNC: NEGATIVE MG/DL
LACTATE SERPL-SCNC: 1.3 MMOL/L (ref 0.5–2)
LEUKOCYTE ESTERASE UR QL STRIP: NEGATIVE
LYMPHOCYTES # BLD AUTO: 2.77 THOUSANDS/ΜL (ref 0.6–4.47)
LYMPHOCYTES NFR BLD AUTO: 36 % (ref 14–44)
MCH RBC QN AUTO: 33.2 PG (ref 26.8–34.3)
MCHC RBC AUTO-ENTMCNC: 32.8 G/DL (ref 31.4–37.4)
MCV RBC AUTO: 101 FL (ref 82–98)
MONOCYTES # BLD AUTO: 0.88 THOUSAND/ΜL (ref 0.17–1.22)
MONOCYTES NFR BLD AUTO: 12 % (ref 4–12)
NEUTROPHILS # BLD AUTO: 3.82 THOUSANDS/ΜL (ref 1.85–7.62)
NEUTS SEG NFR BLD AUTO: 50 % (ref 43–75)
NITRITE UR QL STRIP: NEGATIVE
P AXIS: 66 DEGREES
PH UR STRIP.AUTO: 8.5 [PH] (ref 4.5–8)
PLATELET # BLD AUTO: 219 THOUSANDS/UL (ref 149–390)
PLATELET # BLD AUTO: 221 THOUSANDS/UL (ref 149–390)
PMV BLD AUTO: 10.6 FL (ref 8.9–12.7)
PMV BLD AUTO: 10.9 FL (ref 8.9–12.7)
POTASSIUM SERPL-SCNC: 3.9 MMOL/L (ref 3.5–5.3)
PR INTERVAL: 146 MS
PROT SERPL-MCNC: 6.9 G/DL (ref 6.4–8.2)
PROT UR STRIP-MCNC: NEGATIVE MG/DL
PROTHROMBIN TIME: 13.4 SECONDS (ref 12.1–14.4)
QRS AXIS: 82 DEGREES
QRSD INTERVAL: 80 MS
QT INTERVAL: 340 MS
QTC INTERVAL: 394 MS
RBC # BLD AUTO: 3.97 MILLION/UL (ref 3.81–5.12)
SODIUM SERPL-SCNC: 139 MMOL/L (ref 136–145)
SP GR UR STRIP.AUTO: 1 (ref 1–1.03)
T WAVE AXIS: 65 DEGREES
TSH SERPL DL<=0.05 MIU/L-ACNC: 1.76 UIU/ML (ref 0.36–3.74)
UROBILINOGEN UR QL STRIP.AUTO: 0.2 E.U./DL
VALPROATE SERPL-MCNC: 93 UG/ML (ref 50–100)
VENTRICULAR RATE: 81 BPM
WBC # BLD AUTO: 7.62 THOUSAND/UL (ref 4.31–10.16)

## 2017-11-22 PROCEDURE — 99285 EMERGENCY DEPT VISIT HI MDM: CPT

## 2017-11-22 PROCEDURE — 87040 BLOOD CULTURE FOR BACTERIA: CPT | Performed by: EMERGENCY MEDICINE

## 2017-11-22 PROCEDURE — 80164 ASSAY DIPROPYLACETIC ACD TOT: CPT | Performed by: EMERGENCY MEDICINE

## 2017-11-22 PROCEDURE — 70450 CT HEAD/BRAIN W/O DYE: CPT

## 2017-11-22 PROCEDURE — 81003 URINALYSIS AUTO W/O SCOPE: CPT | Performed by: EMERGENCY MEDICINE

## 2017-11-22 PROCEDURE — 36415 COLL VENOUS BLD VENIPUNCTURE: CPT | Performed by: EMERGENCY MEDICINE

## 2017-11-22 PROCEDURE — 87081 CULTURE SCREEN ONLY: CPT | Performed by: PHYSICIAN ASSISTANT

## 2017-11-22 PROCEDURE — 80177 DRUG SCRN QUAN LEVETIRACETAM: CPT | Performed by: PSYCHIATRY & NEUROLOGY

## 2017-11-22 PROCEDURE — 82948 REAGENT STRIP/BLOOD GLUCOSE: CPT

## 2017-11-22 PROCEDURE — 85730 THROMBOPLASTIN TIME PARTIAL: CPT | Performed by: EMERGENCY MEDICINE

## 2017-11-22 PROCEDURE — 85049 AUTOMATED PLATELET COUNT: CPT | Performed by: PHYSICIAN ASSISTANT

## 2017-11-22 PROCEDURE — 93005 ELECTROCARDIOGRAM TRACING: CPT | Performed by: EMERGENCY MEDICINE

## 2017-11-22 PROCEDURE — 84443 ASSAY THYROID STIM HORMONE: CPT | Performed by: EMERGENCY MEDICINE

## 2017-11-22 PROCEDURE — 80053 COMPREHEN METABOLIC PANEL: CPT | Performed by: EMERGENCY MEDICINE

## 2017-11-22 PROCEDURE — 96374 THER/PROPH/DIAG INJ IV PUSH: CPT

## 2017-11-22 PROCEDURE — 82140 ASSAY OF AMMONIA: CPT | Performed by: EMERGENCY MEDICINE

## 2017-11-22 PROCEDURE — 85610 PROTHROMBIN TIME: CPT | Performed by: EMERGENCY MEDICINE

## 2017-11-22 PROCEDURE — 71010 HB CHEST X-RAY 1 VIEW FRONTAL (PORTABLE): CPT

## 2017-11-22 PROCEDURE — 80165 DIPROPYLACETIC ACID FREE: CPT | Performed by: PSYCHIATRY & NEUROLOGY

## 2017-11-22 PROCEDURE — 85025 COMPLETE CBC W/AUTO DIFF WBC: CPT | Performed by: EMERGENCY MEDICINE

## 2017-11-22 PROCEDURE — 83605 ASSAY OF LACTIC ACID: CPT | Performed by: EMERGENCY MEDICINE

## 2017-11-22 PROCEDURE — 87147 CULTURE TYPE IMMUNOLOGIC: CPT | Performed by: PHYSICIAN ASSISTANT

## 2017-11-22 PROCEDURE — 80201 ASSAY OF TOPIRAMATE: CPT | Performed by: PSYCHIATRY & NEUROLOGY

## 2017-11-22 PROCEDURE — 95951 HB EEG MONITORING/VIDEORECORD: CPT

## 2017-11-22 PROCEDURE — 96361 HYDRATE IV INFUSION ADD-ON: CPT

## 2017-11-22 PROCEDURE — 80339 ANTIEPILEPTICS NOS 1-3: CPT | Performed by: PSYCHIATRY & NEUROLOGY

## 2017-11-22 RX ORDER — SODIUM CHLORIDE 9 MG/ML
1000 INJECTION, SOLUTION INTRAVENOUS CONTINUOUS
Status: DISCONTINUED | OUTPATIENT
Start: 2017-11-22 | End: 2017-11-22 | Stop reason: HOSPADM

## 2017-11-22 RX ORDER — CLOBAZAM 10 MG/1
10 TABLET ORAL EVERY MORNING
Status: DISCONTINUED | OUTPATIENT
Start: 2017-11-23 | End: 2017-11-23

## 2017-11-22 RX ORDER — AMOXICILLIN 250 MG
1 CAPSULE ORAL DAILY
Status: DISCONTINUED | OUTPATIENT
Start: 2017-11-23 | End: 2017-11-27 | Stop reason: HOSPADM

## 2017-11-22 RX ORDER — LACTULOSE 20 G/30ML
20 SOLUTION ORAL ONCE
Status: COMPLETED | OUTPATIENT
Start: 2017-11-22 | End: 2017-11-22

## 2017-11-22 RX ORDER — LORAZEPAM 2 MG/ML
2 INJECTION INTRAMUSCULAR ONCE
Status: COMPLETED | OUTPATIENT
Start: 2017-11-22 | End: 2017-11-22

## 2017-11-22 RX ORDER — LANOLIN ALCOHOL/MO/W.PET/CERES
6 CREAM (GRAM) TOPICAL
Status: DISCONTINUED | OUTPATIENT
Start: 2017-11-22 | End: 2017-11-27 | Stop reason: HOSPADM

## 2017-11-22 RX ORDER — MINERAL OIL AND PETROLATUM 150; 830 MG/G; MG/G
1 OINTMENT OPHTHALMIC 3 TIMES DAILY
Status: DISCONTINUED | OUTPATIENT
Start: 2017-11-22 | End: 2017-11-27 | Stop reason: HOSPADM

## 2017-11-22 RX ORDER — LEVETIRACETAM 100 MG/ML
1000 SOLUTION ORAL EVERY 12 HOURS SCHEDULED
Status: DISCONTINUED | OUTPATIENT
Start: 2017-11-22 | End: 2017-11-27 | Stop reason: HOSPADM

## 2017-11-22 RX ORDER — 0.9 % SODIUM CHLORIDE 0.9 %
3 VIAL (ML) INJECTION AS NEEDED
Status: DISCONTINUED | OUTPATIENT
Start: 2017-11-22 | End: 2017-11-22 | Stop reason: HOSPADM

## 2017-11-22 RX ORDER — VALPROIC ACID 250 MG/5ML
250 SOLUTION ORAL EVERY 8 HOURS
Status: DISCONTINUED | OUTPATIENT
Start: 2017-11-22 | End: 2017-11-27 | Stop reason: HOSPADM

## 2017-11-22 RX ORDER — LACTULOSE 20 G/30ML
30 SOLUTION ORAL 3 TIMES DAILY
Status: DISCONTINUED | OUTPATIENT
Start: 2017-11-22 | End: 2017-11-27 | Stop reason: HOSPADM

## 2017-11-22 RX ORDER — CLOBAZAM 10 MG/1
20 TABLET ORAL
Status: DISCONTINUED | OUTPATIENT
Start: 2017-11-22 | End: 2017-11-27 | Stop reason: HOSPADM

## 2017-11-22 RX ORDER — RUFINAMIDE 200 MG/1
1600 TABLET, FILM COATED ORAL 2 TIMES DAILY
Status: DISCONTINUED | OUTPATIENT
Start: 2017-11-22 | End: 2017-11-27 | Stop reason: HOSPADM

## 2017-11-22 RX ORDER — ACETAMINOPHEN 325 MG/1
650 TABLET ORAL EVERY 6 HOURS PRN
Status: DISCONTINUED | OUTPATIENT
Start: 2017-11-22 | End: 2017-11-27 | Stop reason: HOSPADM

## 2017-11-22 RX ORDER — LEVOCARNITINE 1 G/10ML
500 SOLUTION ORAL 3 TIMES DAILY
Status: DISCONTINUED | OUTPATIENT
Start: 2017-11-22 | End: 2017-11-27 | Stop reason: HOSPADM

## 2017-11-22 RX ORDER — ONDANSETRON 2 MG/ML
4 INJECTION INTRAMUSCULAR; INTRAVENOUS EVERY 6 HOURS PRN
Status: DISCONTINUED | OUTPATIENT
Start: 2017-11-22 | End: 2017-11-27 | Stop reason: HOSPADM

## 2017-11-22 RX ADMIN — ENOXAPARIN SODIUM 40 MG: 40 INJECTION SUBCUTANEOUS at 17:10

## 2017-11-22 RX ADMIN — LEVETIRACETAM 1000 MG: 100 SOLUTION ORAL at 21:11

## 2017-11-22 RX ADMIN — LORAZEPAM 2 MG: 2 INJECTION INTRAMUSCULAR at 10:49

## 2017-11-22 RX ADMIN — SODIUM CHLORIDE 1000 ML/HR: 0.9 INJECTION, SOLUTION INTRAVENOUS at 09:54

## 2017-11-22 RX ADMIN — LACTULOSE 30 G: 20 SOLUTION ORAL at 21:11

## 2017-11-22 RX ADMIN — VALPROIC ACID 250 MG: 250 SOLUTION ORAL at 16:12

## 2017-11-22 RX ADMIN — TOPIRAMATE 250 MG: 100 TABLET, FILM COATED ORAL at 17:10

## 2017-11-22 RX ADMIN — LACTULOSE 20 G: 20 SOLUTION ORAL at 10:30

## 2017-11-22 RX ADMIN — RUFINAMIDE 1600 MG: 200 TABLET, FILM COATED ORAL at 17:09

## 2017-11-22 RX ADMIN — CLOBAZAM 20 MG: 10 TABLET ORAL at 21:10

## 2017-11-22 RX ADMIN — MELATONIN TAB 3 MG 6 MG: 3 TAB at 21:10

## 2017-11-22 RX ADMIN — LACTULOSE 30 G: 20 SOLUTION ORAL at 16:10

## 2017-11-22 NOTE — ED NOTES
g-tube checked for patency  Flushes easily with 30ml of sterile water with returns of yellow stomach contents       Dinah Lanes, [place]  11/22/17 [id number]

## 2017-11-22 NOTE — PROGRESS NOTES
Assessment  1  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with tonic seizures (345 10) (G40 812)   2  Hyperammonemia (270 6) (E72 20)   3  Epilepsy undetermined as to focal or generalized, intractable (345 91) (G40 919)   4  On valproic acid therapy (V58 69) (Z79 899)   5  Excessive daytime sleepiness (780 54) (G47 19)    Plan  Epilepsy undetermined as to focal or generalized, intractable    · LamoTRIgine 200 MG Oral Tablet   Rx By: Hailey Mattson; Dispense: 30 Days ; #:60 Tablet; Refill: 5;Epilepsy undetermined as to focal or generalized, intractable; TOBIAS = N; Print Rx; Last Updated By: Salima Lancaster; 11/7/2017 2:19:50 PM   · From  Banzel 400 MG Oral Tablet TAKE 4 TABLET Twice daily To Banzel 400MG Oral Tablet TAKE 4 TABLETS TWICE A DAY   Rx By: Salima Lancaster; Dispense: 30 Days ; #:240 Tablet; Refill: 5;Epilepsy undetermined as to focal or generalized, intractable; TOBIAS = N; Print Rx   · Fycompa 8 MG Oral Tablet; Give one tablet via G-Tube one time a day forSeizures   Rx By: Salima Lancaster; Dispense: 30 Days ; #:30 Tablet; Refill: 5;Epilepsy undetermined as to focal or generalized, intractable; TOBIAS = N; Print Rx   · Topiramate 50 MG Oral Tablet; Give by PEG 1 tab (with one 200mg tab) twice aday   Rx By: Salima Lancaster; Dispense: 30 Days ; #:60 Tablet; Refill: 5;Epilepsy undetermined as to focal or generalized, intractable; TOBIAS = N; Print Rx; Msg to Pharmacy: total topiramate dose 250mg twice a day  Epilepsy undetermined as to focal or generalized, intractable, Hyperammonemia    · Follow-up visit in 2 months Evaluation and Treatment  Follow-up SOVL  Status: Hold For- Scheduling  Requested for: [id number]   Ordered;Epilepsy undetermined as to focal or generalized, intractable, Hyperammonemia;  Ordered By: Salima Lancaster Performed:  Due: [id number]  Epilepsy undetermined as to focal or generalized, intractable, Lennox-Gastaut syndromewith tonic seizures    · Onfi 10 MG Oral Tablet; Give by G-tube, one tab in morning   Rx By: Mario Rodriguez Darylene Gubler; Dispense: 30 Days ; #:30 Tablet; Refill: 5;Epilepsy undetermined as to focal or generalized, intractable, 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with tonic seizures; TOBIAS = N; Print Rx; Msg to Pharmacy: Onfi dose 10mg in AM and 20mg in PM   · From  Onfi 20 MG Oral Tablet Take one tablet twice a day To Onfi 20 MG OralTablet Give by G-tube 1 tab at bedtime   Rx By: Raise Your Flag; Dispense: 0 Days ; #:30 Tablet; Refill: 5;Epilepsy undetermined as to focal or generalized, intractable, 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with tonic seizures; TOBIAS = N; Print Rx; Msg to Pharmacy: Onfi 10mg in AM and 20mg in PM   · From  Topiramate 200 MG Oral Tablet TAKE 1 TABLET (with 50mg tab) TWICEDAILY To Topiramate 200 MG Oral Tablet (Topamax) TAKE 1 TABLET (with 50mg tab) byG-tube TWICE DAILY   [facility]; Dispense: 30 Days ; #:60 Tablet; Refill: 5;Epilepsy undetermined as to focal or generalized, intractable, 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with tonic seizures; TOBIAS = N; Print Rx; Msg to Pharmacy: total dose 250mg twice a day   · From  Valproic Acid 250 MG/5ML Oral Solution SWALLOW 5 ML Every 8 hoursTo Valproic Acid 250 MG/5ML Oral Solution Give by G-tube 5 ML Every 8 hours   Rx By: Peg Whelse; Dispense: 30 Days ; #:450 ML; Refill: 5;For: Epilepsy undetermined as to focal or generalized, intractable, 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with tonic seizures; TOBIAS = N; Print Rx  Hyperammonemia    · LevOCARNitine (Dietary) 330 MG Oral Tablet   Rx By: Peg Whelse; Dispense: 30 Days ; #:90 Tablet; Refill: 5;Hyperammonemia; TOBIAS = N; Print Rx  Hyperammonemia, On valproic acid therapy    · From  LevOCARNitine 1 GM/10ML Oral Solution  To LevOCARNitine 1GM/10ML Oral Solution Give by G-tube 5 ML 3 times daily   Rx By: [employer] [facility]; Dispense: 30 Days ; #:450 ML;  Refill: 5;Hyperammonemia, On valproic acid therapy; TOBIAS = N; Print Rx    Discussion/Summary  Discussion Summary:   Ms Claudine Frias is a 40year old woman with Lennox Gastaut Syndrome, remote history of anoxic brain injury, h/o status epilepticus, severe intellectual disability  She has a VNS due to intractable epilepsy  Her level of awareness fluctuates between alertness and excessive somnolence  Which may in part be due to her variable hyperammonemia or current dose of Onfi  Based on prior history of medication changes and missed doses it seems that Ms Bull needs at least topiramate and valproic acid to prevent seizures  She had an increase seizure frequency when Fycompa was held  It is unclear if levetiracetam or Banzel has offered much in the way of seizure prevention  Levetiracetam was added this time around because of rapid discontinuation of lamotrigine; however, Fycompa was also added  is postulated that the excessive ammonia level is due to abnormal mitochondrial activity with valproate exposure  One possible treatment is with supplementation with L-carnitine that helps with transport of long chain fatty acids to aid in brain mitochondrial energy metabolism  Due to the inability to discontinue Valproic acid and hyperammoniemia, she needs L-carnitine supplementation  now let's try to get her more awake during the day time, will reduce morning dose of Onfi, monitor patient's level of alertness during the day  Will continue with bedtime dose of Onfi, which may help suppress nocturnal tonic seizures  - continue with Valproic acid 250mg/5mL give 5mL three times a day- continue with Banzel 400mg give 4 tabs twice a day- continue with Fycompa 8mg give one tab dailyconsider transitioning to oral solution / suspension for ease of delivery if it is okay with pharmacy and insurance   Call my office back if willing to change formulation- continue with topiramate 50mg and 200mg tab, one each twice a day- decrease Onfi to 10mg tab one in the morning and 20mg tab one in the evening- continue with levocarnitine 1gm/10mL give 5mL three times a day- follow-up in 2 months- keep track of recurrent seizures, frequency of tonic seizures, excessive sedation  Counseling Documentation With Imm: The patient, patient's caretaker was counseled regarding impressions,-- risks and benefits of treatment options  Decision making was of high-complexity due to the patient's high risk condition (seizures), psychiatric and neuropsychological comorbidities, behavioral problems, memory and cognitive problems and medication side effects  total amount of time spent with the patient was 50 minutes  More than 50% of this time was devoted to counseling and coordination of care  Issues addressed during this clinic visit included overall management, medication counseling or monitoring (including adverse effects, side effects and risks of antiepileptic medications)  Started: 1:40PM Ended: 2:30PM      Chief Complaint  Chief Complaint Free Text Note Form: Patient presents for l follow up for 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with tonic seizures   History of Present Illness  Ms Qasim Carter is a 40year old woman with Lennox Gastaut Syndrome possibly due to anoxic brain damage, severe developmental delays, impulse control disorder here for post-hospitalization follow-up evaluation  There is very little information in the way of developmental / life history  Interval history 11/7/2017 Qasim Carter was admitted to [address] [place] [address] due to pneumonia she was subsequently transferred to [address] [place] [address] for higher level of care  She was in [facility] from 10/10 to 10/26/2017  During her admission, she had seizures and was put on continuous video EEG monitoring to determine her seizure type, seizure frequency, and rapid medication adjustments  The increased in seizure frequency was likely due to an underlying infection, but also she likely had frequent seizures during sleep which did not cause much injury to the patient    A number of medication adjustments were attempted including discontinuation of lamotrigine due to concern about it worsening seizures, reinstitution of levetiracetam, attempted discontinuation of valproic acid, attempted reduction in [address], and starting Fycompa  The patient's seizures were mostly based during sleep, tonic seizures  When valproic acid was discontinued there was an increased number of seizures  Valproic acid was subsequently reinstituted  Patient was discharged on reduced dose of Onfi to keep her more awake during the daytime however this instruction was not forwarded to her nursing home  After she had returned to [facility] , there was a period time when there was an increased seizure frequency due to difficulty obtaining Fycompa after hospitalization because this medicine require prior authorization and was very costly to the facility  There does not seem to be a change in degree of alertness or activity with these current medications  Her sleep cycle has always been erratically  When she is awake she is able to say some words, moan or grunt, interact with caretakers, and is ambulatory with one person assist fairly able to ambulate and sometimes is very playful with ambulation such as lifting both feet up so that her caretakers and carrying her  AED/side effects/compliance: Banzel 1600mg twice a day Fycompa 8mg daily Levetiracetam oral solution 1000mg twice a day Onfi 20mg twice a day Topiramate 250mg BID  Valproic acid 250mg q8hrs Levocarnitine 500mg TID HPI: Collective epilepsy history 11/6/2014was previously evaluated by Dr Kp Laguerre including a hospitalization in February 2013 for status epilepticus, in the setting of missed doses of AEDs due to refusal to eat  She subsequently required anesthetic induced coma to stop her seizures and PEG tube was placed  She was seen almost every month for management of her seizures up until November 2013  She has medically refractory seizures and had a VNS placed possibly in the early 2000s and a generator changed in 2011   Unknown AEDs that were tried but felbamate is listed as an allergy  In 2011, her AEDs were clonazepam, levetiracetam, Depakote and Lamictal  Dr Namita Mead had started [facility] in 2011  In 2012, Paul Desir was added with the reduction of clonazepam  There were difficulty with documenting seizure frequency; but seizures were no longer daily occurrences but there were clusters of seizures  There would be good periods and bad periods of seizure frequency  Dr Namita Mead had been increasing the duty cycle of the VNS over the course of 2012 to 2013  Sometime between August 2013 and October 2013, topiramate was introduced  was in status epilepticus in 2013, she was on Keppra, Banzel, Onfi, and Lamictal  She had an EEG at some point that showed multifocal spike-wave and background attenuation  She has intermittent agitation and day time somnolence  When she was hospitalized for status epilepticus, she was started on methylphenidate to help wake her up   battery was replaced in 2011   history November 2014:level 127  Her Valproic Acid dose was previously 250mg q6hrs based on Dr Fletcher Chatman notes  Since July 2014, she has been receiving VPA 500mg q6hrs  She has not been hospitalized since September 2013  She was previously ambulatory with therapy  She spends most of her time sleeping for the past couple of months  She has also been receiving lactulose for elevated ammonia levels  clear seizures or reports of drop attacks in last 3 months  drugs VPA, LVT, LMG, newer drugs added on since 2011 RFN, Onfi, TPM)  of 2015:decreased VPA from TDD 2000mg to 1000mg with increased agitation/combativeness, alternating days of exhaustion (no behavioral specialist has been involved)  are randomly reported by multiple staff members  Seizures are typically reported by CNAâs or her one to one on the 3PM to 11PM shift  Hugo Hassan her usual nurse who I spoke to over the phone, has not witnessed any seizures herself   She did witness the very first seizure Brian Zamora had when she was admitted to the home, which was a generalized convulsion  These are described a brief events of eyes rolling back and arms going up in the air, followed by hair pulling or slapping herself  These seizures were reported as either sporadic or every other day episodes  have been no documented grand mal seizures or drop attacks  She refuses to wear safety helmet, rips at her hair, and attempts to flip herself out of her chair and bed  They have tried to redirect her and lorazepam is given as needed for aggression but does not respond  These behaviors are worsened during her menses  Her menstrual cycle is irregular and can be heavy  Coty Euceda can walk briefly but walks on her toes, she frequently will allow herself to fall down when she does not want to walk  does not appear that methylphenidate has been useful in maintaining her level of wakefulness during the day  October 2015, her VNS was very near end of service  Since the last visit there have not been reported seizures, convulsions, loss of awareness, or drop attacks  Coty Euceda had her VNS generator change on 11/3/2015  The Model 103 (serial [id number]) was replaced with [employer] Model 105, serial [id number]  Her VNS setting was changed to deliver lower current  made:reduced from 1 75mA to 1 25mA with 30/500/30/1  8output reduced from 2 to 1 5mA  are no reported seizures or worsening drop attacks   have started to wean her off of levetiracetam due to multiple medications and agitation; by the end of 2016, she was down to -500  history 2/2/[id number], -250, CLB 0 5-0 5-2 5,  QID, -200  Since her last visit she has weaned off of [address]  She was seen by Dr Ashwini Chatterjee who increased her VNS outpt current which is at the setting prior to her generator replacement  No seizure or convulsion or myoclonic activity has been witnessed  There are periods of agitation mostly associated with menstrual cycle  She attempts to fight, abdominal pain, and flipping out of chair    history 6/21/[id number], Onfi 5-5-25, RUF [id number], -250, -200  Since the last visit her valproic acid does was decreased due to excessive levels  Her nursing aide who has known her for 4 months reports that Dorinda Carter is generally calm, playful, and she does ambulate from time to time  She received lorazepam this morning  called [facility] nurse at [facility]  Her nurse reports that there have been no witnessed seizures; no drop attacks, tonic spasms, or convulsive activity  Overall, Dorinda Carter has been doing very well  She was very playful with the aides, she was walking with assistance and playing with her toys  She continues to have a three year old mentality occasionally will have an outburst or defiant moment  history 10/3/[id number], Nahid Gallardo 150 5-5-25, -250-250, -250, TPM (not given to patient since 9/2) Ms Winsome Lemus is here for follow-up after a recent breakthrough cluster of seizures  I spoke with Werner Cardoso, her LPN, at [facility]  On 9/26/2016, she had multiple seizures (not charted what type of seizures) over the course of 15 minutes each seizure lasted 10 - 20 seconds, witnessed at 0500; she has a one-on-one all the time, by Leopoldo Pies who is very familiar with the patient  Otherwise, since the last visit, there have been no other documented seizures  No medication changes were noted, there were no recent fevers, coughs, dysuria, or infections  She appears to be at her cognitive baseline; however, during todayâs visit there were multiple tonic spasms with eyes rolling upwards, but she was asleep and these may wake her up  Her nurse reports during periods of wakefulness, she does interact with her care providers, sometimes motions with her arms in a rolling fashion to indicate that she wants to walk  She sometimes name one of her nurses Mili Dunham) by name  When she is defiant she would throw herself to the floor   She goes through spurts of wanting to sleep all day especially when she is the middle of her menstrual period, which typically last 5-7 days and is very heavy  eventually received a faxed copy of her blood work that was requested last week and found that her topiramate level was undetectable  I spoke with Pradeep Rubin a second time and verified that there were no medication refusals, no missed doses of topiramate, no changes to current dose (by any other doctor), and she denies changes to medication formulation  They have been charting that she has been getting topiramate  After the visit, I called [facility]  at [phone], and spoke to the pharmacist who found that the last script of topiramate was for 200mg every 12 hours filled on 9/2/2016; however, only a 10 day supply was sent  It did not appear that the script ran out of refills, if it was a problem with insurance, or if there was a reason only a 10 day supply was sent  They do have an active prescription for topiramate  The pharmacist stated he will reach out to the nursing home and find out what happened and assured me that she will get her medication tonight   history 10/10/[id number], CLB 20-20,  q6hrs, -200, -250  Ever since she was started back on topiramate there have not been reported seizures and that she has been doing well  She continues to have difficulty sleeping through the night and every now and then she is up all night  Reported that she is alert during the day, motioning with her hands when she wants to ambulate (2 person assist)  During the office visit, there were three episodes of her eyes rolling upwards when she was sleeping  She was very lethargic for the visit, she was not given any lorazepam for todayâs visit   Her medications include valproic acid at q6hrs (home prefers oral solution as divalproate sprinkles may get stuck in her G-tube) and Onfi 25mg twice a day (given at 9AM and 9PM, although there was a change for Onfi 20mg tabs, she continued to get two doses of 5mg twice a day)  However, this extra 5mg Onfi dose is not on her Physician Order Report  What I did notice was amoxicillin ordered starting 10/6 and to be discontinued on 10/16  VNS was not changed during this visit (set to cycle every 1 1minutes)  History 11/7/[id number], -250, -200, CLB 20-20   q8hrs  Ms Ana Angeles continues to have occasional cluster of seizures (tonic eyes rolling up, body tensing, with right arm elevated, lasting for a few seconds)  She has a one to one at all times due to her erratic behaviors, including self injury (by throwing herself to the floor) and incoordination  She is more awake but will have stretches of time being awake and active and 7-8 hours of continuous sleep  Sleep can occur at night or during the day  the medication changes from a month ago, there has been an increase in aggressive / agitated behaviors including kicking  She is ambulatory with two persons to watch her / guide her or hold onto a harness so that she does not fall to the floor  [facility] the [facility]  did go for a 3 hours video EEG however, she was very restless and refused to allow the EEG technician to apply the electrodes on her head  history 4/7/[id number], -250, Onfi 20-20, -200,  q8hrs  I spoke with Kristina Cameron from [place] [address] home  Since the last visit, Ms Ru Ospina has been more somnolent, less interactive, constantly sleeping, she appears to be sedated, and she does not eat  She has been losing about 1-2 pound a day  Her current weight is 86kg  She will not walk  When asked about periods of agitation and aggressive behavior it appears to be erratic (there was an ED visit for agitation in February)  She gets weekly blood work for ammonia levels which has been between  (3/13/2017 was 121)  She has been getting lactulose 4 times a day   She continues to have tonic seizures when she is asleep, eyes rolling body, arms will tense up with some twitching, last a few seconds, but will recur  There are no seizures during the daytime  She was seen in the ED on 2/27/2017 for breakthrough seizures but apparently it was due to missed doses of medications as she was discharged from the hospital for treatment of pneumonia (2/22-2/27), and the pharmacy did not receive the medication orders in time for her seizure medications to be administered  history 6/5/[id number], -250, CLB 20-20, -200, -250  We had decreased Valproic acid to 250mg twice a day and there has been an increase in the frequency of tonic seizures mostly while the patient is asleep and around the time of the next dose of her medications (early evening or early morning)  These seizures consist of tonic stiffness, eyes rolling upwards, and subtle arm (right?) jerking  These seizures appear to cluster such as multiple tonic seizures lasting a few seconds within a 30 minute period  However, there has been no convulsion  She continues to have erratic sleep and periods of wakefulness  There is a standing order for her to wear a helmet when she is walking; however, she has been consistently refusing to wear the helmet or it is difficult to keep it on her  She has a constant 1:1 supervision and they use safety mats at the nursing home  There has only been one fall in the last year  The nurses are requesting that the helmet can be discontinued  semiology:She was described to have drop attacks or spells with grunting sounds and falling to the floor (none reported)  Her nurse commented on episodes of tonic seizures, eyes rolling up body tensing up with right arm raised up in the air (daily multiple seizures)  generalized convulsive seizures (last one February 2017 in the setting of missed doses of medications; another one in October 2016, when her topiramate level was undetected)  Featuresepilepticus: yesInjury Seizures: NoFactors: menstrual cycle? ?   Risk Factors:pregnancy: unknownbirth/: unknownDevelopment: unknown developmental historyseizures, simple: unknownseizures, complex: unknowninfection: Noretardation: Yespalsy: Yesinjury (moderate/severe): possibly anoxic brain injuryneoplasm: Nomalformation: Noprocedure: NoNoabuse: Noabuse: Nohistory Sz/epilepsy: unknown  AEDs:Clobazam, Clonazepam, Levetiracetam (discontinued to to polypharmacy and unclear efficacy and aggitation), Lamotrigine, Topiramate (breakthrough seizures when discontinued), valproate      Review of Systems  unable to obtain review of systems due to cognitive impairment      Active Problems  1  ADHD, predominantly inattentive type (314 00) (F90 0)   2  Anoxic brain damage, not elsewhere classified (348 1) (G93 1)   3  Elevated liver enzymes (790 5) (R74 8)   4  Encounter for postoperative care (V58 49) (Z48 89)   5  Epilepsy undetermined as to focal or generalized, intractable (345 91) (G40 919)   6  Excessive daytime sleepiness (780 54) (G47 19)   7  Hyperammonemia (270 6) (E72 20)   8  Hyperkeratosis (701 1) (L85 9)   9  Intellectual disability (319) (F79)   10  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with tonic seizures (345 10) (G40 812)   11  Lethargy (780 79) (R53 83)   12  On valproic acid therapy (V58 69) (Z79 899)   13  Onychomycosis (110 1) (B35 1)   14  Osteoporosis (733 00) (M81 0)   15  Surgery, elective (V50 9) (Z41 9)    Past Medical History  1  History of Clostridium difficile enteritis (008 45) (A04 72)  Active Problems And Past Medical History Reviewed: The active problems and past medical history were reviewed and updated today  Surgical History    1  History of Percutaneous Placement Of Gastrostomy Tube   2  History of Placement Of Intracranial Neurostimulator Pulse Generator    Family History  Mother    1   No pertinent family history    unable to obtain   Social History     · Living In [address] [place] [address] Permanently   · Never A Smoker   · Never Drank Alcohol   · Never Used Drugs  Social History Reviewed: The social history was reviewed and is unchanged  Living situation:  [facility], [place] unit Tobacco:  No tobacco use  Alcohol:  No alcohol use  Drugs:  No illegal drug use      Current Meds   1  Acetaminophen 325 MG Oral Tablet; TAKE 2 TABLET Every 6 hours PRN via G-Tube; Therapy: (Recorded:11Sep2017) to Recorded   2  Banzel 400 MG Oral Tablet; TAKE 4 TABLET Twice daily; Last Rx:07Apr2017 Ordered   3  Bisac-Evac 10 MG Rectal Suppository; INSERT 1 SUPPOSITORY RECTALLY AT BEDTIME PRN; Therapy: (Recorded:11Sep2017) to Recorded   4  Centrum Silver Oral Tablet; Give one tablet via G-Tube one time a day; Therapy: (0472 51 11 42) to Recorded   5  Certa-Javi LIQD; GIVE 1 TABLET VIA G-TUBE ONE TIME A DAY; Therapy: (Recorded:11Sep2017) to Recorded   6  CertaVite Senior/Antioxidant Oral Tablet; Give one tablet via G-Tube one time a day; Therapy: (0472 51 11 42) to Recorded   7  Fleet Bisacodyl 10 MG/30ML Rectal Enema; INSERT 1 APPLICATOR RECTALLY AS NEEDED AT BEDTIME; Therapy: (Recorded:11Sep2017) to Recorded   8  Fycompa 8 MG Oral Tablet; Give one tablet via G-Tube one time a day for Seizures; Therapy: (0472 51 11 42) to Recorded   9  Lactulose 10 GM PACK; Give 1 packet via G-Tube two times a day; Therapy: (0472 51 11 42) to Recorded   10  Lactulose 10 GM/15ML Oral Solution; GIVE 30ML VIA G-TUBE TWO TIMES A DAY; Therapy: [id number] to Recorded   11  LamoTRIgine 200 MG Oral Tablet; Take 1 tablet twice daily; Last Rx:11Sep2017 Ordered   12  LevOCARNitine (Dietary) 330 MG Oral Tablet; TAKE 1 TABLET Every 8 hours; Therapy: 07Apr2017 to (Evaluate:04Oct2017); Last Rx:07Apr2017 Ordered   13  LevOCARNitine 1 GM/10ML Oral Solution; Therapy: (0472 51 11 42) to Recorded   14  Milk of Magnesia 7 75 % Oral Suspension; GIVE 30ML BY MOUTH AT BEDTIME AS  NEEDED; Therapy: (Recorded:11Sep2017) to Recorded   15   Ondansetron 4 MG Oral Tablet Disintegrating; GIVE 4 MG VIA G-TUBE EVERY 8 HOUR  FOR NAUSEA; Therapy: (0472 51 11 42) to Recorded   16  Onfi 20 MG Oral Tablet; Take one tablet twice a day; Last Rx:07Apr2017 Ordered   17  Senexon TABS; TAKE 1 TABLET Daily via G-tube; Therapy: (Recorded:11Sep2017) to Recorded   18  Systane Nighttime Ophthalmic Ointment; INSTILL 1 GRAM IN BOTH EYES THREE TIMES  A DAY FOR DRY EYES;  Therapy: (Recorded:11Sep2017) to Recorded   19  Topiramate 200 MG Oral Tablet; TAKE 1 TABLET (with 50mg tab) TWICE DAILY; Last  Rx:05Jun2017 Ordered   20  Topiramate 50 MG Oral Tablet; Give by PEG 1 tab (with one 200mg tab) twice a day; Therapy: [id number] to (Evaluate:13Qof6629); Last Rx:05Jun2017 Ordered   21  Valproic Acid 250 MG/5ML Oral Solution; SWALLOW 5 ML Every 8 hours; Therapy: 07Apr2017 to (Evaluate:10Mar2018); Last Rx:11Sep2017 Ordered    Allergies    1  Felbatol TABS    Vitals  Signs   Recorded: [id number] 02:10PM   Heart Rate: 85  Systolic: 94, LUE, Sitting  Diastolic: 58, LUE, Sitting  Height: 5 ft 3 in  Weight Unobtainable: Yes    Physical Exam  GENERAL: today she is somnolent, minimally engaging in examination  Eyes: Anicteric, dysconjugate gaze Carotids: n/a Abdomen: soft nontender  MENTAL STATUS Orientation: awake, no verbal out put Fund of knowledge: not testable Attention/concentration: not testable Recent/remote memory: not testable Language: no verbalizations  OPHTHALMOSCOPIC Fundus/Optic discs/Posterior segments: n/a  CRANIAL NERVES II: PERRL  III, IV, VI: Extraocular movements intact but dysconjugate gaze  No nystagmus   V: unable to assess VII: face appears to be symmetric VIII: unable to assess IX, X: unable to test XI: unable to test XII: unable to test  MOTOR (Upper and lower extremities)  Bulk/tone/abnormal movement: patient's tone is flaccid throughout, muscle bulk is less than normal Drift: left arm weaker than right Strength: unable to assess   COORDINATION  F/N: unable to test FFM: unable to test CARTER: unable to test Station/Gait: deferred due to risk of falling  SENSORY Withdraw to noxious stimulation Romberg unable to test  Reflexes:  n/a      Results/Data  Diagnostic Studies Reviewed:  CT Scan Review CT head 2/21/2013CT of brain  Diagnostic Review EEG:video EEG monitoring 10/13 â 10/15/2017generalized spike/polyspike-slow wave complexes during sleeptimes there are independent right more than left midtemporal spikes and bitemporal spikes  generalized tonic seizures during sleep, generalized 1 Hz period discharges, that would become continuous for 30 seconds or generalized rhythmic alpha-beta discharges, associated with patient becoming rigid, tonic posturing with arms elevated and tense with subtle jerking of the upper body, eyes opening with upward deviation  dePadua reported 4 ictal patterns:â 1-3 seconds of diffuse electrodecrement then 2-7 seconds of fast activity then 2Hz spike wave discharges (no clinical manifestation)â same as #1, clinically accompanied by posturing of the arms, then clonic jerkingâ diffuse low fast activity without progressing to spike-wave dischargesâ 2 Hz generalized discharges for 2-3 minutes with no clonical edilsvuqfuymju45/15/2017 â the tonic clonic seizures were less frequent  video EEG monitoring 10/18 â 10/25/2017background slowing with bursts of arrhythmic generalized spike wave discharges, with shifting lateralization, and independent left and right temporal spikeseyelid myoclonia associated with brief bursts of 2-2 5 Hz generalized dischargesseizures with eelctrodecrementwere innumerable tonic seizures; however by 10/25/2017 the frequency of these seizures did decrease in frequency  6 58 2acid 46  21 4  8 7/12 0/36/969441/3 7/107/24/14/0 4/94        Procedure  VNS Interrogation without reprogramming: Model 105 SN [id number] Implanted 11/3/2015 Settings: Output 2 00mA, Signal frequency 30Hz, Pulse Width 500 microsec, on time 21 sec, off time 0 8 minute Mag setting Output 2 25mA, Signal on 60 sec, pulse width 500 microsec  System diagnostic Communication OK Outpt Current OK Lead impendence OK, [address] IFI No      Future Appointments    Date/Time Provider Specialty Site   01/22/2018 08:00 AM PHYLLIS Mendoza   Neurology [facility] 0[address]       Signatures   Electronically signed by : PHYLLIS Skinner ; Nov 21 2017  8:38AM EST                       (Author)

## 2017-11-22 NOTE — ED PROVIDER NOTES
History  Chief Complaint   Patient presents with    Altered Mental Status     pt is at [address] and this am loc was decreased     HPI    40 yoF chronically debilitated female with feeding tube in, ID, epilepsy and nonverbal presents with somnolence and AMS as per SNF  This morning, she was felt to be drowsy as compared to usual and had possibly foul urine  Soils diapers at baseline  Then, a couple minutes before EMS arrived, she was "unresponsive" and had only response to pain  During EMS ride, vitals were stable and she started to wake up a bit  By arrival, she is still somnolent, but more interactive with my exam   She is unable to give any history at baseline  Lives with hypotension  No other relevant changes as per SNF  She is full code  MDM:  Imp: chronically ill female with ID who exhibits somnolence worrisome for sepsis vs post-ictal vs metabolic encephalopathy etc   She warrants septic w/u, ammonia, tsh, ekg, possible CTH if no cause found, and likely admission  Prior to Admission Medications   Prescriptions Last Dose Informant Patient Reported? Taking?    Multiple Vitamins-Minerals (CENTRUM SILVER PO)   Yes Yes   Si tablet by Per G Tube route daily   Perampanel 4 MG tablet   No Yes   Sig: Take 2 tablets by mouth daily for 30 days   Probiotic Product (ALEXANDER-BID PROBIOTIC PO)   Yes No   Sig: Take by mouth   Rufinamide (BANZEL PO)   Yes Yes   Si,600 mg by Per G Tube route 2 (two) times a day     Sennosides-Docusate Sodium (SENEXON-S PO)   Yes Yes   Si tablet by Per G Tube route daily     Valproate Sodium (VALPROIC ACID) 250 MG/5ML SOLN   Yes Yes   Si mL by Per G Tube route every 8 (eight) hours     White Petrolatum-Mineral Oil (SYSTANE NIGHTTIME) OINT   Yes Yes   Sig: Apply 1 application to eye 3 (three) times a day Both eyes    acetaminophen (TYLENOL) 325 mg tablet   Yes Yes   Si mg by Per G Tube route every 6 (six) hours as needed for mild pain or fever bisacodyl (BISAC-EVAC) 10 mg suppository   Yes Yes   Sig: Insert 10 mg into the rectum as needed for constipation Indications: if no BM on day 4    cloBAZam (ONFI) tablet   No Yes   Si tablet by Per NG Tube route daily at bedtime for 30 days   cloBAZam (ONFI) tablet   No Yes   Si tablet by Per G Tube route every morning for 30 days   lactulose (CEPHULAC) 10 G packet   Yes Yes   Si mL by Per G Tube route 2 (two) times a day     levETIRAcetam (KEPPRA) 100 mg/mL oral solution   No Yes   Sig: 10 mL by Per G Tube route every 12 (twelve) hours for 30 days   levOCARNitine (CARNITOR) 1 g/10 mL solution   No Yes   Si mL by Per G Tube route 3 (three) times a day for 30 days   magnesium hydroxide (MILK OF MAGNESIA) 400 mg/5 mL oral suspension   Yes Yes   Si mL by Per G Tube route as needed for constipation     melatonin 3 mg   No Yes   Sig: Take 2 tablets by mouth daily at bedtime for 30 days   ondansetron (ZOFRAN-ODT) 4 mg disintegrating tablet   No Yes   Sig: Take 1 tablet by mouth every 8 (eight) hours as needed for nausea or vomiting   sodium phosphate (FLEET) enema   Yes Yes   Sig: Insert 1 enema into the rectum as needed Indications: if no BM on day 5  follow package directions   topiramate (TOPAMAX) 200 MG tablet   Yes Yes   Si mg by Per G Tube route 2 (two) times a day        Facility-Administered Medications: None       Past Medical History:   Diagnosis Date    ADHD     Anoxic brain damage (HCC)     Autistic disorder     Epilepsy ([address] )     Hyperammonemia (HCC)     Hyperkeratosis     Hypotension     Hypotension     Intellectual disability     Intellectual disability     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with tonic seizures (HCC)     Lethargy     Liver enzyme elevation     Onychomycosis     Osteoporosis     Osteoporosis     Psychiatric disorder     anxiety    Seizures ([address] )        Past Surgical History:   Procedure Laterality Date    CARDIAC PACEMAKER PLACEMENT      JEJUNOSTOMY FEEDING TUBE      PEG TUBE PLACEMENT         History reviewed  No pertinent family history  I have reviewed and agree with the history as documented  Social History   Substance Use Topics    Smoking status: Never Smoker    Smokeless tobacco: Never Used    Alcohol use No        Review of Systems   Unable to perform ROS: Patient nonverbal       Physical Exam  ED Triage Vitals [11/22/17 0907]   Temperature Pulse Respirations Blood Pressure SpO2   98 2 °F (36 8 °C) 86 22 91/63 94 %      Temp Source Heart Rate Source Patient Position - Orthostatic VS BP Location FiO2 (%)   Tympanic Monitor Lying Left arm --      Pain Score       No Pain           Orthostatic Vital Signs  Vitals:    11/22/17 1030 11/22/17 1145 11/22/17 1230 11/22/17 1245   BP: 96/69 103/70 95/64    Pulse: 78 80 83 80   Patient Position - Orthostatic VS:   Lying Lying       Physical Exam   Constitutional: She appears well-developed and well-nourished  No distress  Drowsy appearing female as compared to baseline  HENT:   Head: Normocephalic and atraumatic  Nose: Nose normal    Mouth/Throat: Oropharynx is clear and moist    Eyes: Conjunctivae and EOM are normal  Pupils are equal, round, and reactive to light  Neck: Normal range of motion  Neck supple  Cardiovascular: Normal rate, regular rhythm, normal heart sounds and intact distal pulses  Exam reveals no gallop and no friction rub  No murmur heard  Pulmonary/Chest: Effort normal and breath sounds normal  No stridor  No respiratory distress  She has no wheezes  She has no rales  She exhibits no tenderness  Abdominal: Soft  Bowel sounds are normal  She exhibits no distension  There is no tenderness  There is no rebound and no guarding  PEG tube securely in place without redness or irritation  Musculoskeletal: Normal range of motion  She exhibits no deformity  Neurological: She is alert  She exhibits normal muscle tone  No myoclonus elicited    Normal reflexes at patella bilaterally  Moves all 4 extrem appropriately to localize pain  Chronic contractures again seen of upper extremities  Makes groaning sounds  Eyes open spontaneously  Skin: Skin is warm and dry  No rash noted  No erythema  Psychiatric: She has a normal mood and affect  Vitals reviewed        ED Medications  Medications   lactulose 20 g/30 mL oral solution 20 g (20 g Oral Given 11/22/17 1030)   LORazepam (ATIVAN) 2 mg/mL injection 2 mg (2 mg Intravenous Given 11/22/17 1049)       Diagnostic Studies  Results Reviewed     Procedure Component Value Units Date/Time    Valproic acid level, total [[id number]]  (Normal) Collected:  11/22/17 0915    Lab Status:  Final result Specimen:  Blood from Arm, Right Updated:  11/22/17 1125     Valproic Acid, Total 93 ug/mL     UA w Reflex to Microscopic w Reflex to Culture [[id number]]  (Abnormal) Collected:  11/22/17 0950    Lab Status:  Final result Specimen:  Urine from Urine, Straight Cath Updated:  11/22/17 1004     Color, UA Light Yellow     Clarity, UA Clear     Specific [place], UA 1 005     pH, UA 8 5 (H)     Leukocytes, UA Negative     Nitrite, UA Negative     Protein, UA Negative mg/dl      Glucose, UA Negative mg/dl      Ketones, UA Negative mg/dl      Urobilinogen, UA 0 2 E U /dl      Bilirubin, UA Negative     Blood, UA Negative    Comprehensive metabolic panel [[id number]]  (Abnormal) Collected:  11/22/17 0915    Lab Status:  Final result Specimen:  Blood from Arm, Right Updated:  11/22/17 0959     Sodium 139 mmol/L      Potassium 3 9 mmol/L      Chloride 104 mmol/L      CO2 31 mmol/L      Anion Gap 4 mmol/L      BUN 9 mg/dL      Creatinine 0 47 (L) mg/dL      Glucose 162 (H) mg/dL      Calcium 9 2 mg/dL      AST 16 U/L      ALT 31 U/L      Alkaline Phosphatase 87 U/L      Total Protein 6 9 g/dL      Albumin 3 0 (L) g/dL      Total Bilirubin 0 10 (L) mg/dL      eGFR 127 ml/min/1 73sq m     Narrative:         National Kidney Disease Education Program recommendations are as follows:  GFR calculation is accurate only with a steady state creatinine  Chronic Kidney disease less than 60 ml/min/1 73 sq  meters  Kidney failure less than 15 ml/min/1 73 sq  meters  TSH [[id number]]  (Normal) Collected:  11/22/17 0915    Lab Status:  Final result Specimen:  Blood from Arm, Right Updated:  11/22/17 0954     TSH 3RD GENERATON 1 757 uIU/mL     Narrative:         Patients undergoing fluorescein dye angiography may retain small amounts of fluorescein in the body for 48-72 hours post procedure  Samples containing fluorescein can produce falsely depressed TSH values  If the patient had this procedure,a specimen should be resubmitted post fluorescein clearance  The recommended reference ranges for TSH during pregnancy are as follows:  First trimester 0 1 to 2 5 uIU/mL  Second trimester  0 2 to 3 0 uIU/mL  Third trimester 0 3 to 3 0 uIU/m      Lactic Acid x2 [[id number]]  (Normal) Collected:  11/22/17 0915    Lab Status:  Final result Specimen:  Blood from Arm, Right Updated:  11/22/17 0948     LACTIC ACID 1 3 mmol/L     Narrative:         Result may be elevated if tourniquet was used during collection  Ammonia [[id number]]  (Abnormal) Collected:  11/22/17 0915    Lab Status:  Final result Specimen:  Blood from Arm, Right Updated:  11/22/17 0940     Ammonia 75 (H) umol/L     Protime-INR [[id number]]  (Normal) Collected:  11/22/17 0915    Lab Status:  Final result Specimen:  Blood from Arm, Right Updated:  11/22/17 0938     Protime 13 4 seconds      INR 1 03    APTT [[id number]]  (Normal) Collected:  11/22/17 0915    Lab Status:  Final result Specimen:  Blood from Arm, Right Updated:  11/22/17 0938     PTT 29 seconds     Narrative: Therapeutic Heparin Range = 60-90 seconds    Blood culture #1 [[id number]] Collected:  11/22/17 0932    Lab Status:   In process Specimen:  Blood from Hand, Right Updated:  11/22/17 0937    CBC and differential [[id number]]  (Abnormal) Collected:  11/22/17 0915 Lab Status:  Final result Specimen:  Blood from Arm, Right Updated:  11/22/17 0930     WBC 7 62 Thousand/uL      RBC 3 97 Million/uL      Hemoglobin 13 2 g/dL      Hematocrit 40 2 %       (H) fL      MCH 33 2 pg      MCHC 32 8 g/dL      RDW 12 3 %      MPV 10 9 fL      Platelets 860 Thousands/uL      Neutrophils Relative 50 %      Lymphocytes Relative 36 %      Monocytes Relative 12 %      Eosinophils Relative 2 %      Basophils Relative 0 %      Neutrophils Absolute 3 82 Thousands/µL      Lymphocytes Absolute 2 77 Thousands/µL      Monocytes Absolute 0 88 Thousand/µL      Eosinophils Absolute 0 13 Thousand/µL      Basophils Absolute 0 02 Thousands/µL     Blood culture #2 [[id number]] Collected:  11/22/17 0915    Lab Status: In process Specimen:  Blood from Arm, Right Updated:  11/22/17 0927    Fingerstick Glucose (POCT) [[id number]]  (Abnormal) Collected:  11/22/17 0919    Lab Status:  Final result Updated:  11/22/17 0921     POC Glucose 161 (H) mg/dl                  XR chest 1 view portable   ED Interpretation by Case BARBARA Robin DO (11/22 9118)   Clear lungs  neurostimulator again seen      Final Result by Geraldo Horton DO (11/22 5321)   1  Improving peribronchial thickening since prior exam   Consider mild residual bronchial inflammation  2   Otherwise no active disease seen in the chest            Workstation performed: [id number]         CT head wo contrast   Final Result by WILLIE Martell MD (11/22 1017)      No acute intracranial abnormality  Left mastoid and left middle ear effusion, new since there were 2/22/2017  Workstation performed: [id number]                    Procedures  Procedures       Phone Contacts  ED Phone Contact    ED Course  ED Course as of Nov 22 1821   Wed Nov 22, 2017   1034 Note found from last admission notable for ongoing atypical epilepsy as a cause for ongoing somnolence    While she has h/o tonic-clonic seizure, she also manifests these atypical partial seizures as confusion, not unlike her evaluation today  Ativan trial will be used  PACS aware of need for transfer to undergo EMU  MDM  CritCare Time    Disposition  Final diagnoses: Altered mental status   Hyperammonemia ([address] )     Time reflects when diagnosis was documented in both MDM as applicable and the Disposition within this note     Time User Action Codes Description Comment    11/22/2017 10:44 AM Lobito, Case Add [[id number]] Altered mental status     11/22/2017 10:45 AM Lobito, Case Add [[id number]] Hyperammonemia [facility])       ED Disposition     ED Disposition Condition Comment    Transfer to Another [address] should be transferred out to [facility]        MD Documentation    [address] Most Recent Value   Patient Condition  The patient has been stabilized such that within reasonable medical probability, no material deterioration of the patient condition or the condition of the unborn child(aki) is likely to result from the transfer   Reason for Transfer  Level of Care needed not available at this facility   Accepting Physician  [address] Name, [address]    Sending MD  [facility]   Provider Certification  General risk, such as traffic hazards, adverse weather conditions, rough terrain or turbulence, possible failure of equipment (including vehicle or aircraft), or consequences of actions of persons outside the control of the transport personnel, Risk of worsening condition      RN Documentation    [address] Name, [address]   B   Report Given to  Amparo Zaragoza      Follow-up Information    None       Discharge Medication List as of 11/22/2017 12:54 PM      CONTINUE these medications which have NOT CHANGED    Details   acetaminophen (TYLENOL) 325 mg tablet 650 mg by Per G Tube route every 6 (six) hours as needed for mild pain or fever  , Historical Med      bisacodyl (BISAC-EVAC) 10 mg suppository Insert 10 mg into the rectum as needed for constipation Indications: if no BM on day 4 , Until Discontinued, Historical Med      !! cloBAZam (ONFI) tablet 1 tablet by Per NG Tube route daily at bedtime for 30 days, Starting Thu 10/26/2017, Until Sat 11/25/2017, Print      !! cloBAZam (ONFI) tablet 1 tablet by Per G Tube route every morning for 30 days, Starting Thu 10/26/2017, Until Sat 11/25/2017, Print      lactulose (CEPHULAC) 10 G packet 30 mL by Per G Tube route 2 (two) times a day  , Historical Med      levETIRAcetam (KEPPRA) 100 mg/mL oral solution 10 mL by Per G Tube route every 12 (twelve) hours for 30 days, Starting Thu 10/26/2017, Until Sat 11/25/2017, Print      levOCARNitine (CARNITOR) 1 g/10 mL solution 5 mL by Per G Tube route 3 (three) times a day for 30 days, Starting Thu 10/26/2017, Until Sat 11/25/2017, Print      magnesium hydroxide (MILK OF MAGNESIA) 400 mg/5 mL oral suspension 30 mL by Per G Tube route as needed for constipation  , Until Discontinued, Historical Med      melatonin 3 mg Take 2 tablets by mouth daily at bedtime for 30 days, Starting Thu 10/26/2017, Until Sat 11/25/2017, Print      Multiple Vitamins-Minerals (CENTRUM SILVER PO) 1 tablet by Per G Tube route daily, Until Discontinued, Historical Med      ondansetron (ZOFRAN-ODT) 4 mg disintegrating tablet Take 1 tablet by mouth every 8 (eight) hours as needed for nausea or vomiting, Starting 6/9/2017, Until Discontinued, Print      Perampanel 4 MG tablet Take 2 tablets by mouth daily for 30 days, Starting Fri 10/27/2017, Until Sun 11/26/2017, Print      Probiotic Product (ALEXNADER-BID PROBIOTIC PO) Take by mouth, Until Discontinued, Historical Med      Rufinamide (BANZEL PO) 1,600 mg by Per G Tube route 2 (two) times a day  , Until Discontinued, Historical Med      Sennosides-Docusate Sodium (SENEXON-S PO) 1 tablet by Per G Tube route daily  , Until Discontinued, Historical Med      sodium phosphate (FLEET) enema Insert 1 enema into the rectum as needed Indications: if no BM on day 5  follow package directions, Until Discontinued, Historical Med      topiramate (TOPAMAX) 200 MG tablet 250 mg by Per G Tube route 2 (two) times a day  , Until Discontinued, Historical Med      Valproate Sodium (VALPROIC ACID) 250 MG/5ML SOLN 5 mL by Per G Tube route every 8 (eight) hours  , Until Discontinued, Historical Med      White Petrolatum-Mineral Oil (SYSTANE NIGHTTIME) OINT Apply 1 application to eye 3 (three) times a day Both eyes , Until Discontinued, Historical Med       !! - Potential duplicate medications found  Please discuss with provider  No discharge procedures on file      ED Provider  Electronically Signed by           Aman Quick DO  11/22/17 2389

## 2017-11-22 NOTE — H&P
History and Physical - [facility] Internal Medicine    Patient Information: Orlena Duane 40 y o  female MRN: [id number]  Unit/Bed#: [facility] 662-68 Encounter: [id number]  Admitting Physician: La Nena Tirado PA-C  PCP: Steve Curtis DO  Date of Admission:  11/22/17    Assessment/Plan:    Hospital Problem List:     Principal Problem:    Somnolence  Active Problems:    Dysphagia    Seizure disorder ([address] )    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HCC)    Increased ammonia level    Protein calorie malnutrition ([facility] )    Intellectual disability      Plan for the Primary Problem(s):  · Somnolence:  · Continuous EEG monitoring  · Neurology consult  · May need medication adjustments  She had recent medication changes on 11/7  · Neuro checks  · Hyperammonemia:  · This is chronic  · Patient is on lactulose and L-carnitine  · Ammonia 75  Will increase lactulose  Monitor ammonia  Plan for Additional Problems:   · Lennox Gastaut Syndrome, epilepsy s/p VNS:  · Current lay on Onfi 10 mg in the morning and 20 mg at night, Keppra 1000 mg every 12 hours, Perampanel 8 mg daily, Banzel 1600 mg twice daily, Topamax 250 mg twice daily, and valproic acid 250 mg every 8 hours  · Check medication levels  · She had recent medication changes by Dr Hector Cantrell on 11/7/17  · Neurology following  · Intellectual disability:  · Resides at nursing home  · Requires continuous one-to-one observation  · Dysphagia:  · On PEG feeds with pleasure feedings when awake (Level 3 dysphagia, thin liquids)  · Hold po diet at this time  · Severe protein calorie malnutrition:  · Will place on tube feedings from last hospitalization  · Nutrition for recs    VTE Prophylaxis: Enoxaparin (Lovenox)  / sequential compression device   Code Status: Full code  POLST: There is no POLST form on file for this patient (pre-hospital)    Anticipated Length of Stay:  Patient will be admitted on an Inpatient basis with an anticipated length of stay of  > 2 midnights     Justification for FLORESITA SOTO Stay: EMU    Total Time for Visit, including Counseling / Coordination of Care: 45 minutes  Greater than 50% of this total time spent on direct patient counseling and coordination of care  Chief Complaint:   Somnolence    History of Present Illness:    Orlena Duane is a 40 y o  female with a history of Lennox Gastaut Syndrome, remote history of anoxic brain injury, status epilepticus s/p VNS, hyperammonemia, and severe intellectual disability who presents from nursing home with excessive somnolence  On EMS arrival, patient was responsive only to pain  Patient unable to provide history given somnolence  She follows with Dr Hector Cantrell from Neurology  She originally presented to [facility]  Her ammonia level was noted to be 75  Head CT unremarkable  [facility]  Review of Systems:    Review of Systems   Unable to perform ROS: Mental status change       Past Medical and Surgical History:     Past Medical History:   Diagnosis Date    ADHD     Anoxic brain damage [facility] )     Autistic disorder     Epilepsy ([facility] )     Hyperammonemia (HCC)     Hyperkeratosis     Hypotension     Hypotension     Intellectual disability     Intellectual disability     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with tonic seizures (HCC)     Lethargy     Liver enzyme elevation     Onychomycosis     Osteoporosis     Osteoporosis     Psychiatric disorder     anxiety    Seizures (HCC)        Past Surgical History:   Procedure Laterality Date    CARDIAC PACEMAKER PLACEMENT      JEJUNOSTOMY FEEDING TUBE      PEG TUBE PLACEMENT         Meds/Allergies:    Prior to Admission medications    Medication Sig Start Date End Date Taking?  Authorizing Provider   acetaminophen (TYLENOL) 325 mg tablet 650 mg by Per G Tube route every 6 (six) hours as needed for mild pain or fever      Historical Provider, MD   bisacodyl (BISAC-EVAC) 10 mg suppository Insert 10 mg into the rectum as needed for constipation Indications: if no BM on day 4      Historical Provider, MD   cloBAZam (ONFI) tablet 1 tablet by Per NG Tube route daily at bedtime for 30 days 10/26/17 11/25/17  Dennis Caceres MD   cloBAZam (ONFI) tablet 1 tablet by Per G Tube route every morning for 30 days 10/26/17 11/25/17  Dennis Caceres MD   lactulose ([address]) 10 G packet 30 mL by Per G Tube route 2 (two) times a day      Historical Provider, MD   levETIRAcetam (KEPPRA) 100 mg/mL oral solution 10 mL by Per G Tube route every 12 (twelve) hours for 30 days 10/26/17 11/25/17  Dennis Caceres MD   levOCARNitine (CARNITOR) 1 g/10 mL solution 5 mL by Per G Tube route 3 (three) times a day for 30 days 10/26/17 11/25/17  Dennis Caceres MD   magnesium hydroxide (MILK OF MAGNESIA) 400 mg/5 mL oral suspension 30 mL by Per G Tube route as needed for constipation      Historical Provider, MD   melatonin 3 mg Take 2 tablets by mouth daily at bedtime for 30 days 10/26/17 11/25/17  Dennis Caceres MD   Multiple Vitamins-Minerals (CENTRUM SILVER PO) 1 tablet by Per G Tube route daily    Historical Provider, MD   ondansetron (ZOFRAN-ODT) 4 mg disintegrating tablet Take 1 tablet by mouth every 8 (eight) hours as needed for nausea or vomiting 6/9/17   Mansoor Fagan MD   Perampanel 4 MG tablet Take 2 tablets by mouth daily for 30 days 10/27/17 11/26/17  Dennis Caceres MD   Probiotic Product (ALEXANDER-BID PROBIOTIC PO) Take by mouth    Historical Provider, MD   Rufinamide (BANZEL PO) 1,600 mg by Per G Tube route 2 (two) times a day      Historical Provider, MD   Sennosides-Docusate Sodium (SENEXON-S PO) 1 tablet by Per G Tube route daily      Historical Provider, MD   sodium phosphate (FLEET) enema Insert 1 enema into the rectum as needed Indications: if no BM on day 5  follow package directions    Historical Provider, MD   topiramate (TOPAMAX) 200 MG tablet 250 mg by Per G Tube route 2 (two) times a day      Historical Provider, MD   Valproate Sodium (VALPROIC ACID) 250 MG/5ML SOLN 5 mL by Per G Tube route every 8 (eight) hours      Historical Provider, MD   White Petrolatum-Mineral Oil (SYSTANE NIGHTTIME) OINT Apply 1 application to eye 3 (three) times a day Both eyes     Historical Provider, MD   ofloxacin (OCUFLOX) 0 3 % ophthalmic solution 1 drop 4 (four) times a day  11/22/17  Historical Provider, MD     I have reviewed home medications using allscripts  Allergies: Allergies   Allergen Reactions    Felbamate        Social History:     Marital Status: Single   Occupation:    Patient Pre-hospital Living Situation: Lives at [facility]  Patient Pre-hospital Level of Mobility: None  Patient Pre-hospital Diet Restrictions: None  Substance Use History:   History   Alcohol Use No     History   Smoking Status    Never Smoker   Smokeless Tobacco    Never Used     History   Drug Use No       Family History:    non-contributory    Physical Exam:     Vitals:   Pulse: 84 (11/22/17 1450)  Temperature: (!) 97 1 °F (36 2 °C) (11/22/17 1450)  Temp Source: Axillary (11/22/17 1450)  Respirations: 14 (11/22/17 1450)  Height: 5' (152 4 cm) (11/22/17 1450)  Weight - Scale: 39 5 kg (87 lb 1 3 oz) (11/22/17 1450)  SpO2: 99 % (11/22/17 1450)    Physical Exam   Constitutional:   Thin   HENT:   Head: Normocephalic and atraumatic  Eyes: Pupils are equal, round, and reactive to light  Neck: Neck supple  Cardiovascular: Normal rate, regular rhythm and normal heart sounds  Pulmonary/Chest: Effort normal and breath sounds normal  No respiratory distress  She has no wheezes  She has no rales  Abdominal:   PEG tube   Musculoskeletal: She exhibits no edema  Contracted   Neurological: GCS eye subscore is 3  GCS verbal subscore is 1  GCS motor subscore is 5  Somnolent  Does briefly open eyes to command  Skin: Skin is warm and dry  Additional Data:     Lab Results: I have personally reviewed pertinent reports          Results from last 7 days  Lab Units 11/22/17  4145   WBC Thousand/uL 7 62   HEMOGLOBIN g/dL 13 2   HEMATOCRIT % 40 2   PLATELETS Thousands/uL 219   NEUTROS PCT % 50   LYMPHS PCT % 36   MONOS PCT % 12   EOS PCT % 2       Results from last 7 days  Lab Units 11/22/17  0915   SODIUM mmol/L 139   POTASSIUM mmol/L 3 9   CHLORIDE mmol/L 104   CO2 mmol/L 31   BUN mg/dL 9   CREATININE mg/dL 0 47*   CALCIUM mg/dL 9 2   TOTAL PROTEIN g/dL 6 9   BILIRUBIN TOTAL mg/dL 0 10*   ALK PHOS U/L 87   ALT U/L 31   AST U/L 16   GLUCOSE RANDOM mg/dL 162*       Results from last 7 days  Lab Units 11/22/17  0915   INR  1 03       Imaging: I have personally reviewed pertinent reports  Xr Chest 1 View Portable    Result Date: 11/22/2017  Impression: THIS REPORT EXISTS IN [place] BUT HAS NOT BEEN SAVED IN EPIC BECAUSE IT IS PARTIAL    Xr Abdomen 1 View Kub    Result Date: 11/18/2017  Narrative: ABDOMEN INDICATION:  36-year-old female, feeding tube localization COMPARISON:  7/14/2017 x-rays VIEWS:  AP supine IMAGES:  1 FINDINGS: 50 mL of Omnipaque 240 administered per gastrostomy tube Typical gastric opacification  No evidence of leakage  There is a nonobstructive bowel gas pattern  No discernible free air on this supine study  Upright or left lateral decubitus imaging is more sensitive to detect subtle free air in the appropriate setting  No pathologic calcifications or soft tissue masses  Visualized lung bases are clear  Visualized osseous structures are unremarkable for the patient's age  Impression: Gastrostomy tube terminating at the lower body of the stomach  No evidence of leakage  Workstation performed: [id number]     Ct Head Wo Contrast    Result Date: 11/22/2017  Narrative: CT BRAIN - WITHOUT CONTRAST INDICATION:  Altered mental status (acute encephalopathy)  COMPARISON:  February 22, 2017 TECHNIQUE:  CT examination of the brain was performed  In addition to axial images, coronal reformatted images were created and submitted for interpretation    Radiation dose length product (DLP) for this visit:  990 3 mGy-cm   This examination, like all CT scans performed in the [facility], was performed utilizing techniques to minimize radiation dose exposure, including the use of iterative reconstruction and automated exposure control  IMAGE QUALITY:  Diagnostic  FINDINGS:  PARENCHYMA:  No intracranial mass, mass effect or midline shift  No CT signs of acute infarction  There is no parenchymal hemorrhage  VENTRICLES AND EXTRA-AXIAL SPACES:  Normal for patient's age  VISUALIZED ORBITS AND PARANASAL SINUSES:  Unremarkable  CALVARIUM AND EXTRACRANIAL SOFT TISSUES:  Calvarium intact  Fluid opacification of left mastoid air cells  Fluid opacification of left middle ear  Impression: No acute intracranial abnormality  Left mastoid and left middle ear effusion, new since there were 2/22/2017  Workstation performed: [id number]       EKG, Pathology, and Other Studies Reviewed on Admission:   · EKG: None available for review    Allscripts / Epic Records Reviewed: Yes     ** Please Note: This note has been constructed using a voice recognition system   **

## 2017-11-22 NOTE — ED NOTES
Pt to be transferred to [facility]  Father called and made aware       Thelma Henson RN  11/22/17 7929

## 2017-11-22 NOTE — CONSULTS
Epilepsy/Neurology Consultation Note  Monica Duncan 40 y o  female   : 1980  MRN: [id number]     [facility]  [facility]    Encounter: [id number]  Admission Date: 2017  Date of Service: 17     -------------------------------------------------------------------------------------------------------------------  Physician Requesting Consult: Kaylynn Ponce DO  Reason for Consult / Principal Problem: lennox gastaut syndrome, somnolence, episode of unresponsiveness  Hx and PE limited by: patient is nonverbal  History from chart review  -------------------------------------------------------------------------------------------------------------------  History of Present Illness:    Monica Duncan is a 40 y o  woman with Lennox Gastaut Syndrome, history of remote anoxic brain injury, past status epilepticus and severe intellectual disability who presents with increased somnolence  She is followed by Dr Susy Singh regarding epilepsy  She was admitted about a month ago in the setting of pneumonia, increased somnolence and found to have frequent seizures  Multiple medication changes were made and seizures improved during her admission  Her facility called EMS after she was less responsive than usual this morning  On EMS arrival she was responsive to pain only  At [facility] ER UA was unremarkable  Ammonia was 75 (chornically elevated)  Head CT was unremarkable  She was transferred to [facility] for possible EEG monitoring and higher level of care  Additional detail regarding her epilepsy history is available in Dr Blossom Sanchez 17 office note       Current AEDs:   Banzel 1600mg twice a day   Fycompa 8mg daily   Levetiracetam oral solution 1000mg twice a day   Onfi 10 mg AM / 20 mg PM  Topiramate 250mg BID    Valproic acid 250mg q8hrs     Additional medications include:   Levocarnitine 500mg TID ------------------------------------------------------------------------------------------------------------------  Prior to Admission Medications   Prescriptions Last Dose Informant Patient Reported? Taking?    Multiple Vitamins-Minerals (CENTRUM SILVER PO)   Yes No   Si tablet by Per G Tube route daily   Perampanel 4 MG tablet   No No   Sig: Take 2 tablets by mouth daily for 30 days   Probiotic Product (ALEXANDER-BID PROBIOTIC PO)   Yes No   Sig: Take by mouth   Rufinamide (BANZEL PO)   Yes No   Si,600 mg by Per G Tube route 2 (two) times a day     Sennosides-Docusate Sodium (SENEXON-S PO)   Yes No   Si tablet by Per G Tube route daily     Valproate Sodium (VALPROIC ACID) 250 MG/5ML SOLN   Yes No   Si mL by Per G Tube route every 8 (eight) hours     White Petrolatum-Mineral Oil (SYSTANE NIGHTTIME) OINT   Yes No   Sig: Apply 1 application to eye 3 (three) times a day Both eyes    acetaminophen (TYLENOL) 325 mg tablet   Yes No   Si mg by Per G Tube route every 6 (six) hours as needed for mild pain or fever     bisacodyl (BISAC-EVAC) 10 mg suppository   Yes No   Sig: Insert 10 mg into the rectum as needed for constipation Indications: if no BM on day 4    cloBAZam (ONFI) tablet   No No   Si tablet by Per NG Tube route daily at bedtime for 30 days   cloBAZam (ONFI) tablet   No No   Si tablet by Per G Tube route every morning for 30 days   lactulose (CEPHULAC) 10 G packet   Yes No   Si mL by Per G Tube route 2 (two) times a day     levETIRAcetam (KEPPRA) 100 mg/mL oral solution   No No   Sig: 10 mL by Per G Tube route every 12 (twelve) hours for 30 days   levOCARNitine (CARNITOR) 1 g/10 mL solution   No No   Si mL by Per G Tube route 3 (three) times a day for 30 days   magnesium hydroxide (MILK OF MAGNESIA) 400 mg/5 mL oral suspension   Yes No   Si mL by Per G Tube route as needed for constipation     melatonin 3 mg   No No   Sig: Take 2 tablets by mouth daily at bedtime for 30 days   ondansetron (ZOFRAN-ODT) 4 mg disintegrating tablet   No No   Sig: Take 1 tablet by mouth every 8 (eight) hours as needed for nausea or vomiting   sodium phosphate (FLEET) enema   Yes No   Sig: Insert 1 enema into the rectum as needed Indications: if no BM on day 5  follow package directions   topiramate (TOPAMAX) 200 MG tablet   Yes No   Si mg by Per G Tube route 2 (two) times a day        Facility-Administered Medications: None       Scheduled Meds:  artificial tear 1 application Ophthalmic TID   [START ON 2017] cloBAZam 10 mg Per G Tube QAM   cloBAZam 20 mg Per NG Tube HS   enoxaparin 40 mg Subcutaneous Q24H   lactulose 30 g Oral TID   levETIRAcetam 1,000 mg Per G Tube Q12H [address]   levOCARNitine 500 mg Per G Tube TID   [START ON 2017] Perampanel 8 mg Oral Daily   rufinamide 1,600 mg Per G Tube BID   [START ON 2017] senna-docusate sodium 1 tablet Per G Tube Daily   topiramate 250 mg Per G Tube BID   Valproic Acid 250 mg Per G Tube Q8H     Continuous Infusions:   PRN Meds:   acetaminophen    magnesium hydroxide    ondansetron     ------------------------------------------------------------------------------------------------------------------  Past Medical / Surgical History / Social History / Family History:    Past Medical History:   Diagnosis Date    ADHD     Anoxic brain damage (HCC)     Autistic disorder     Epilepsy ([facility] )     Hyperammonemia (HCC)     Hyperkeratosis     Hypotension     Hypotension     Intellectual disability     Intellectual disability     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with tonic seizures (HCC)     Lethargy     Liver enzyme elevation     Onychomycosis     Osteoporosis     Osteoporosis     Psychiatric disorder     anxiety    Seizures ([facility] )      Past Surgical History:   Procedure Laterality Date    CARDIAC PACEMAKER PLACEMENT      JEJUNOSTOMY FEEDING TUBE      PEG TUBE PLACEMENT       Social History     Social History    Marital status: Single Spouse name: N/A    Number of children: N/A    Years of education: N/A     Occupational History    Not on file  Social History Main Topics    Smoking status: Never Smoker    Smokeless tobacco: Never Used    Alcohol use No    Drug use: No    Sexual activity: No     Other Topics Concern    Not on file     Social History Narrative    No narrative on file       Family History:  No family history on file  Allergies: Allergies   Allergen Reactions    Felbamate           ------------------------------------------------------------------------------------------------------------------  ROS:  Not able to perform  ------------------------------------------------------------------------------------------------------------------  Vitals:  Pulse 84, temperature (!) 97 1 °F (36 2 °C), temperature source Axillary, resp  rate 14, height 5' (1 524 m), weight 39 5 kg (87 lb 1 3 oz), SpO2 99 %  General Exam:   Resting peacefully  Neurologic Exam  Mental Status: Sleeping  No response to voice  Opens eyes briefly for a few seconds in response to painful stimulation  With repeated stimulation she tracks me briefly  Language: Nonverbal     Cranial Nerves: Pupils equal and reactive to light  No blink to threat, but tracks me breifly  Looks toward left and right, + occulocephalic response with good movements left and right when not aroused  Face appears symmetric  Motor: Decreased tone throughout  Moves all 4 extremities purposefully to painful stimulation  At least 3/5 in the uppers  At least 2/5 in the lowers  Sensory: Responds to pain in all extremities  Coordination: not able to test    Station and gait: not able to test    Reflexes: No ankle clonus   Left toe down, right toe up with plantar stimulation      ------------------------------------------------------------------------------------------------------------------  Additional Data:  Lab Results:   CBC:   Results from last 7 days  Lab Units 11/22/17  1632 11/22/17  0915   WBC Thousand/uL  --  7 62   RBC Million/uL  --  3 97   HEMOGLOBIN g/dL  --  13 2   HEMATOCRIT %  --  40 2   MCV fL  --  101*   PLATELETS Thousands/uL 221 219   , BMP/CMP:   Results from last 7 days  Lab Units 11/22/17  0915   SODIUM mmol/L 139   POTASSIUM mmol/L 3 9   CHLORIDE mmol/L 104   CO2 mmol/L 31   ANION GAP mmol/L 4   BUN mg/dL 9   CREATININE mg/dL 0 47*   GLUCOSE RANDOM mg/dL 162*   CALCIUM mg/dL 9 2   AST U/L 16   ALT U/L 31   ALK PHOS U/L 87   TOTAL PROTEIN g/dL 6 9   ALBUMIN g/dL 3 0*   BILIRUBIN TOTAL mg/dL 0 10*   EGFR ml/min/1 73sq m 127   , Ammonia:   Results from last 7 days  Lab Units 11/22/17  0915   AMMONIA umol/L 75*     Valproate: 93  Imaging Studies: I have personally reviewed pertinent reports  -------------------------------------------------------------------------------------------------------------------  Impression:  Ralph Zaldivar is a 40 y o  female with Lennox Gastaut Syndrome, history of remote anoxic brain injury, past status epilepticus and severe intellectual disability who presents with increased somnolence  She is responsive to pain currently  She typically has multiple seizures per day  During her admission about a month ago there were episodes where she was only responsive to pain that did not necessarily correlate with increased seizure frequency  Differential for her decreased responsiveness in the setting of underlying chronic cerebral dysfunction includes systemic infection, medication effect (including possible excessive serum AED levels) and increased seizure burden  Her history of irregular sleep/wake cycle and inadequate sleep can also contribute  Recommendations:  1  Continue prior to admission antiepileptic medications (listed in HPI, orders placed)  Supply confirmed with pharmacy  2  Valproate level 93  Free valproate, topiramate, levetiracetam and rufinamide levels have been ordered  3  Infectious/metabolic workup per primary team  Ammonia near baseline  UA unremarkable  She is at risk for aspiration PNA  4  Seizure precautions  One-to-one supervision  Administer Ativan IV for motor seizure greater than 3 minutes  5  Will consider 24 hour period of VEEG monitoring to evaluate seizure burden based on clinical course over the next 12-24 hours  6  Encourage appropriate sleep/wake cycle  Keep room dark and quiet at night  Engage/stimulate and keep room light during the day  Continue melatonin 6 mg at night  ICD 10 diagnosis: Wing Nicholas    Total time spent today 85 minutes  Greater than 50% of total time was spent with the patient and / or family counseling and / or coordination of care  A description of the counseling / coordination of care: discussed impression/plan with primary team and nursing  Discussed case with pharmacy, 's ED attending and EEG staff  Reviewed extensive past records     -------------------------------------------------------------------------------------------------------------------    Yevgeniy Warner MD             Date: 11/22/2017     Time: 5:39 PM  [id number] [facility] Neurology [facility]

## 2017-11-22 NOTE — EMTALA/ACUTE CARE TRANSFER
[address]  [address] [facility] [address]  Dept: [phone]      EMTALA TRANSFER CONSENT    NAME Chele Deal                                         1980                              MRN [id number]    I have been informed of my rights regarding examination, treatment, and transfer   by Dr Jasmin Stout DO    Benefits:      Risks:        Transfer Request   I acknowledge that my medical condition has been evaluated and explained to me by the emergency department physician or other qualified medical person and/or my attending physician who has recommended and offered to me further medical examination and treatment  I understand the Hospital's obligation with respect to the treatment and stabilization of my emergency medical condition  I nevertheless request to be transferred  I release the Hospital, the doctor, and any other persons caring for me from all responsibility or liability for any injury or ill effects that may result from my transfer and agree to accept all responsibility for the consequences of my choice to transfer, rather than receive stabilizing treatment at the Hospital  I understand that because the transfer is my request, my insurance may not provide reimbursement for the services  The Hospital will assist and direct me and my family in how to make arrangements for transfer, but the hospital is not liable for any fees charged by the transport service  In spite of this understanding, I refuse to consent to further medical examination and treatment which has been offered to me, and request transfer to  Lawanda Tolbert Name, [address] : [facility]  I authorize the performance of emergency medical procedures and treatments upon me in both transit and upon arrival at the receiving facility  Additionally, I authorize the release of any and all medical records to the receiving facility and request they be transported with me, if possible      I authorize the performance of emergency medical procedures and treatments upon me in both transit and upon arrival at the receiving facility  Additionally, I authorize the release of any and all medical records to the receiving facility and request they be transported with me, if possible  I understand that the safest mode of transportation during a medical emergency is an ambulance and that the Hospital advocates the use of this mode of transport  Risks of traveling to the receiving facility by car, including absence of medical control, life sustaining equipment, such as oxygen, and medical personnel has been explained to me and I fully understand them  (THOMAS CORRECT BOX BELOW)  [  ]  I consent to the stated transfer and to be transported by ambulance/helicopter  [  ]  I consent to the stated transfer, but refuse transportation by ambulance and accept full responsibility for my transportation by car  I understand the risks of non-ambulance transfers and I exonerate the Hospital and its staff from any deterioration in my condition that results from this refusal     X___________________________________________    DATE  17  TIME________  Signature of patient or legally responsible individual signing on patient behalf           RELATIONSHIP TO PATIENT_________________________          Provider Certification    NAME Siomara Jason [facility] 1980                              MRN [id number]    A medical screening exam was performed on the above named patient  Based on the examination:    Condition Necessitating Transfer The primary encounter diagnosis was Altered mental status  A diagnosis of Hyperammonemia (HCC) was also pertinent to this visit      Patient Condition: The patient has been stabilized such that within reasonable medical probability, no material deterioration of the patient condition or the condition of the unborn child(aki) is likely to result from the transfer    Reason for Transfer: Level of Care needed not available at this facility    Transfer Requirements: Facility [facility]   · Space available and qualified personnel available for treatment as acknowledged by    · Agreed to accept transfer and to provide appropriate medical treatment as acknowledged by       [facility] ([facility])  · Appropriate medical records of the examination and treatment of the patient are provided at the time of transfer   [address] [facility], [address] _______  · Transfer will be performed by qualified personnel from    and appropriate transfer equipment as required, including the use of necessary and appropriate life support measures  Provider Certification: I have examined the patient and explained the following risks and benefits of being transferred/refusing transfer to the patient/family:  General risk, such as traffic hazards, adverse weather conditions, rough terrain or turbulence, possible failure of equipment (including vehicle or aircraft), or consequences of actions of persons outside the control of the transport personnel, Risk of worsening condition      Based on these reasonable risks and benefits to the patient and/or the unborn child(aki), and based upon the information available at the time of the patients examination, I certify that the medical benefits reasonably to be expected from the provision of appropriate medical treatments at another medical facility outweigh the increasing risks, if any, to the individuals medical condition, and in the case of labor to the unborn child, from effecting the transfer      X____________________________________________ DATE 11/22/17        TIME_______      ORIGINAL - SEND TO MEDICAL RECORDS   COPY - SEND WITH PATIENT DURING TRANSFER

## 2017-11-22 NOTE — MEDICAL STUDENT
H&P Exam - Britt Persaud 40 y o  female MRN: [id number]    Unit/Bed#: [facility] 719-01 Encounter: [id number]    Assessment:  · Altered Mental Status   · Pt presented to [facility] ED 11/22/17 with altered mental status  She had a reported decrease in her alertness, was increasingly somnolent and in the ambulance became responsive to painful stimuli only  Due to her seizure activity hx she required further in patient care and was transferred to [facility] in patient services  · On examination today, pt is unable to be aroused, excessive somnolence noted  · Appreciate Neurology recommendations   · Pt with 1:1 to ensure no OOB without assistance and monitor seizure activity    · 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with tonic seizures   · Pt being followed by Dr Bebeto Griffin for management of seizures   · Prescribed Banzel 400 mg, 4x per day ; Fycompa 8 mg qd ; Valproic Acid 5 mL q 8 hours; Onfi 20 mg BID, Topiramate 200 mg bid, Lamotrigine 200 mg qd  · Neurology following, will monitor and adjust medications prn   · Pt on 1:1 to monitor for seizure activity   · Consider constant EEG monitoring   · Hyperammonemia  · Followed by Dr Sunny Birmingham for management, chronically high levels   · Today 11/22/17 level: 75  · Obtain serum Ammonia tomorrow    History of Present Illness   Pt is a 41 y/o female who lives in a nursing facility with a PMH significant for 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with tonic seziures ahd hyperammonemia who presents to [facility] following altered mental status presentation to [facility]  Per the documented notes, the patient had increasing somnolence and had a decrease in alertness in the ambulance where she was only alert to painful stimuli  She is followed consistently by Dr Bebeto Griffin who reports that her baseline varies from alert to somnolent and he recently adjusted medications on 11/7/17 to help increase her energy   Today the patient was unable to provide a significant HPI as she remained asleep throughout the exam and was unable to arouse with verbal stimuli  Review of Systems   Constitutional: Positive for activity change (reported increase in somnolence)  Gastrointestinal:        Pt has PEG in place   Limited ROS secondary to pt somnolence throughout examination       Historical Information   Past Medical History:   Diagnosis Date    ADHD     Anoxic brain damage ([facility] [address] )     Autistic disorder     Epilepsy ([facility] [address] )     Hyperammonemia (HCC)     Hyperkeratosis     Hypotension     Hypotension     Intellectual disability     Intellectual disability     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with tonic seizures (HCC)     Lethargy     Liver enzyme elevation     Onychomycosis     Osteoporosis     Osteoporosis     Psychiatric disorder     anxiety    Seizures (HCC)      Past Surgical History:   Procedure Laterality Date    CARDIAC PACEMAKER PLACEMENT      JEJUNOSTOMY FEEDING TUBE      PEG TUBE PLACEMENT       Social History   History   Alcohol Use No     History   Drug Use No     History   Smoking Status    Never Smoker   Smokeless Tobacco    Never Used     Family History: non-contributory    Meds/Allergies   all medications and allergies reviewed  Allergies   Allergen Reactions    Felbamate        Objective   First Vitals:   Pulse: 84 (11/22/17 1450)  Temperature: (!) 97 1 °F (36 2 °C) (11/22/17 1450)  Temp Source: Axillary (11/22/17 1450)  Respirations: 14 (11/22/17 1450)  Height: 5' (152 4 cm) (11/22/17 1450)  Weight - Scale: 39 5 kg (87 lb 1 3 oz) (11/22/17 1450)  SpO2: 99 % (11/22/17 1450)    Current Vitals:   Pulse: 84 (11/22/17 1450)  Temperature: (!) 97 1 °F (36 2 °C) (11/22/17 1450)  Temp Source: Axillary (11/22/17 1450)  Respirations: 14 (11/22/17 1450)  Height: 5' (152 4 cm) (11/22/17 1450)  Weight - Scale: 39 5 kg (87 lb 1 3 oz) (11/22/17 1450)  SpO2: 99 % (11/22/17 1450)    No intake or output data in the 24 hours ending 11/22/17 1512    Invasive Devices     Peripheral Intravenous Line            Peripheral IV 11/22/17 Right Wrist less than 1 day          Drain            Gastrostomy/Enterostomy Gastrostomy 18 Fr   days          Nasogastric/Orogastric Tube            Feeding Tube 294 days                Physical Exam   Of note, physical exam limited 2/2 pt somnolence  Constitutional:   Thin appearing female sleeping in bed   HENT:   Head: Atraumatic  Neck: Neck supple  No tracheal deviation present  Cardiovascular: Normal rate, regular rhythm and intact distal pulses  Exam reveals no gallop and no friction rub  No murmur heard  Pulmonary/Chest: Effort normal and breath sounds normal  No respiratory distress  She has no wheezes  She has no rales  She exhibits no tenderness  Neurological:   Pt not alert throughout entirety of examination   Skin: Skin is warm and dry  No rash noted  No erythema  No pallor  Lab Results: Ammonia level: 75  Imaging: CT head: No acute intracranial abnormality  L mastoid and L middle ear effusion which is new since 02/2017  Chest Xray revealed no acute abnormalities  EKG, Pathology, and Other Studies: EKG obtained at [address], awaiting results  Code Status: Level 1 - Full code    Counseling / Coordination of Care: Total floor / unit time spent today 45 minutes

## 2017-11-22 NOTE — PLAN OF CARE
DISCHARGE PLANNING     Discharge to home or other facility with appropriate resources Progressing        Knowledge Deficit     Patient/family/caregiver demonstrates understanding of disease process, treatment plan, medications, and discharge instructions Progressing        NEUROSENSORY - ADULT     Achieves stable or improved neurological status Progressing     Absence of seizures Progressing     Remains free of injury related to seizures activity Progressing     Achieves maximal functionality and self care Progressing        Potential for Falls     Patient will remain free of falls Progressing        Prexisting or High Potential for Compromised Skin Integrity     Skin integrity is maintained or improved Progressing        SAFETY ADULT     Maintain or return to baseline ADL function Progressing     Maintain or return mobility status to optimal level Progressing     Patient will remain free of falls Progressing

## 2017-11-22 NOTE — ED NOTES
Starting to wake up  Taking off pulse ox probe and attempted to bet of the litter  Aide 1:1 at bedside       Alyssactlaura Otero, [facility]  11/22/17 7080

## 2017-11-23 ENCOUNTER — APPOINTMENT (INPATIENT)
Dept: NEUROLOGY | Facility: AMBULATORY SURGERY CENTER | Age: 37
DRG: 081 | End: 2017-11-23
Payer: MEDICARE

## 2017-11-23 ENCOUNTER — GENERIC CONVERSION - ENCOUNTER (OUTPATIENT)
Dept: OTHER | Facility: OTHER | Age: 37
End: 2017-11-23

## 2017-11-23 PROBLEM — E72.20 HYPERAMMONEMIA (HCC): Status: ACTIVE | Noted: 2017-11-23

## 2017-11-23 PROBLEM — H65.199 ACUTE MEE (MIDDLE EAR EFFUSION): Status: ACTIVE | Noted: 2017-11-23

## 2017-11-23 PROBLEM — E43 SEVERE PROTEIN-CALORIE MALNUTRITION (HCC): Status: ACTIVE | Noted: 2017-11-22

## 2017-11-23 LAB
ALBUMIN SERPL BCP-MCNC: 2.9 G/DL (ref 3.5–5)
ALP SERPL-CCNC: 82 U/L (ref 46–116)
ALT SERPL W P-5'-P-CCNC: 26 U/L (ref 12–78)
AMMONIA PLAS-SCNC: 83 UMOL/L (ref 11–35)
ANION GAP SERPL CALCULATED.3IONS-SCNC: 7 MMOL/L (ref 4–13)
AST SERPL W P-5'-P-CCNC: 15 U/L (ref 5–45)
BILIRUB SERPL-MCNC: 0.24 MG/DL (ref 0.2–1)
BUN SERPL-MCNC: 4 MG/DL (ref 5–25)
CALCIUM SERPL-MCNC: 8.9 MG/DL (ref 8.3–10.1)
CHLORIDE SERPL-SCNC: 108 MMOL/L (ref 100–108)
CO2 SERPL-SCNC: 26 MMOL/L (ref 21–32)
CREAT SERPL-MCNC: 0.39 MG/DL (ref 0.6–1.3)
ERYTHROCYTE [DISTWIDTH] IN BLOOD BY AUTOMATED COUNT: 12.6 % (ref 11.6–15.1)
GFR SERPL CREATININE-BSD FRML MDRD: 136 ML/MIN/1.73SQ M
GLUCOSE SERPL-MCNC: 141 MG/DL (ref 65–140)
HCT VFR BLD AUTO: 40 % (ref 34.8–46.1)
HGB BLD-MCNC: 12.9 G/DL (ref 11.5–15.4)
MCH RBC QN AUTO: 32.3 PG (ref 26.8–34.3)
MCHC RBC AUTO-ENTMCNC: 32.3 G/DL (ref 31.4–37.4)
MCV RBC AUTO: 100 FL (ref 82–98)
PLATELET # BLD AUTO: 233 THOUSANDS/UL (ref 149–390)
PMV BLD AUTO: 11.3 FL (ref 8.9–12.7)
POTASSIUM SERPL-SCNC: 3.4 MMOL/L (ref 3.5–5.3)
PROT SERPL-MCNC: 6.7 G/DL (ref 6.4–8.2)
RBC # BLD AUTO: 4 MILLION/UL (ref 3.81–5.12)
SODIUM SERPL-SCNC: 141 MMOL/L (ref 136–145)
WBC # BLD AUTO: 6.99 THOUSAND/UL (ref 4.31–10.16)

## 2017-11-23 PROCEDURE — 80053 COMPREHEN METABOLIC PANEL: CPT | Performed by: PHYSICIAN ASSISTANT

## 2017-11-23 PROCEDURE — 82140 ASSAY OF AMMONIA: CPT | Performed by: PHYSICIAN ASSISTANT

## 2017-11-23 PROCEDURE — 85027 COMPLETE CBC AUTOMATED: CPT | Performed by: PHYSICIAN ASSISTANT

## 2017-11-23 PROCEDURE — 95951 HB EEG MONITORING/VIDEORECORD: CPT

## 2017-11-23 RX ORDER — POTASSIUM CHLORIDE 20MEQ/15ML
40 LIQUID (ML) ORAL ONCE
Status: COMPLETED | OUTPATIENT
Start: 2017-11-23 | End: 2017-11-23

## 2017-11-23 RX ADMIN — MELATONIN TAB 3 MG 6 MG: 3 TAB at 21:39

## 2017-11-23 RX ADMIN — LEVETIRACETAM 1000 MG: 100 SOLUTION ORAL at 08:35

## 2017-11-23 RX ADMIN — LACTULOSE 30 G: 20 SOLUTION ORAL at 16:28

## 2017-11-23 RX ADMIN — LEVOCARNITINE 500 MG: 1 SOLUTION ORAL at 21:40

## 2017-11-23 RX ADMIN — CLOBAZAM 20 MG: 10 TABLET ORAL at 21:39

## 2017-11-23 RX ADMIN — LEVOCARNITINE 500 MG: 1 SOLUTION ORAL at 16:30

## 2017-11-23 RX ADMIN — LEVETIRACETAM 1000 MG: 100 SOLUTION ORAL at 21:40

## 2017-11-23 RX ADMIN — RUFINAMIDE 1600 MG: 200 TABLET, FILM COATED ORAL at 18:01

## 2017-11-23 RX ADMIN — LACTULOSE 30 G: 20 SOLUTION ORAL at 21:39

## 2017-11-23 RX ADMIN — MINERAL OIL AND WHITE PETROLATUM 1 APPLICATION: 150; 830 OINTMENT OPHTHALMIC at 10:57

## 2017-11-23 RX ADMIN — LEVOCARNITINE 500 MG: 1 SOLUTION ORAL at 08:35

## 2017-11-23 RX ADMIN — LACTULOSE 30 G: 20 SOLUTION ORAL at 08:34

## 2017-11-23 RX ADMIN — CLOBAZAM 10 MG: 10 TABLET ORAL at 08:35

## 2017-11-23 RX ADMIN — PERAMPANEL 8 MG: 4 TABLET ORAL at 08:35

## 2017-11-23 RX ADMIN — RUFINAMIDE 1600 MG: 200 TABLET, FILM COATED ORAL at 08:35

## 2017-11-23 RX ADMIN — VALPROIC ACID 250 MG: 250 SOLUTION ORAL at 16:29

## 2017-11-23 RX ADMIN — MINERAL OIL AND WHITE PETROLATUM 1 APPLICATION: 150; 830 OINTMENT OPHTHALMIC at 16:29

## 2017-11-23 RX ADMIN — VALPROIC ACID 250 MG: 250 SOLUTION ORAL at 08:34

## 2017-11-23 RX ADMIN — TOPIRAMATE 250 MG: 100 TABLET, FILM COATED ORAL at 18:00

## 2017-11-23 RX ADMIN — VALPROIC ACID 250 MG: 250 SOLUTION ORAL at 00:08

## 2017-11-23 RX ADMIN — ENOXAPARIN SODIUM 40 MG: 40 INJECTION SUBCUTANEOUS at 18:00

## 2017-11-23 RX ADMIN — TOPIRAMATE 250 MG: 100 TABLET, FILM COATED ORAL at 08:34

## 2017-11-23 RX ADMIN — MINERAL OIL AND WHITE PETROLATUM 1 APPLICATION: 150; 830 OINTMENT OPHTHALMIC at 21:39

## 2017-11-23 RX ADMIN — POTASSIUM CHLORIDE 40 MEQ: 20 SOLUTION ORAL at 13:12

## 2017-11-23 NOTE — ASSESSMENT & PLAN NOTE
Was on Onfi 10 mg in the morning and 20 mg at night - AM dose stopped  Keppra 1000 mg every 12 hours  Perampanel 8 mg daily   Banzel 1600 mg twice daily   Topamax 250 mg twice daily   valproic acid 250 mg every 8 hours    Appreciate neuro  EEG monitoring

## 2017-11-23 NOTE — PLAN OF CARE
DISCHARGE PLANNING     Discharge to home or other facility with appropriate resources Progressing        Knowledge Deficit     Patient/family/caregiver demonstrates understanding of disease process, treatment plan, medications, and discharge instructions Progressing        NEUROSENSORY - ADULT     Achieves stable or improved neurological status Progressing     Absence of seizures Progressing     Remains free of injury related to seizures activity Progressing     Achieves maximal functionality and self care Progressing        Nutrition/Hydration-ADULT     Nutrient/Hydration intake appropriate for improving, restoring or maintaining nutritional needs Progressing        Potential for Falls     Patient will remain free of falls Progressing        Prexisting or High Potential for Compromised Skin Integrity     Skin integrity is maintained or improved Progressing        SAFETY ADULT     Maintain or return to baseline ADL function Progressing     Maintain or return mobility status to optimal level Progressing     Patient will remain free of falls Progressing

## 2017-11-23 NOTE — PROGRESS NOTES
Epilepsy Consult Follow-up Note  Patient Name: Jakub Arias  MRN: [id number]  Date: 11/23/17 Time: 11:16 AM     LOS: 1 day     Interval History:  Remains somnolent  Arouses to pain, but then falls asleep quickly  Current AEDs:   Banzel 1600mg twice a day   Fycompa 8mg daily   Levetiracetam oral solution 1000mg twice a day   Onfi 10 mg AM / 20 mg PM  Topiramate 250mg BID    Valproic acid 250mg q8hrs      Additional medications include:   Levocarnitine 500mg TID     Summary of Events/Seizures:  Multiple electrographic seizures with and without clinical correlate (eye opening, arm posturing) overnight  Seizures more frequent during sleep (patient is often asleep)  See VEEG report  Medications   artificial tear 1 application Ophthalmic TID   cloBAZam 10 mg Per G Tube QAM   cloBAZam 20 mg Per NG Tube HS   enoxaparin 40 mg Subcutaneous Q24H   lactulose 30 g Oral TID   levETIRAcetam 1,000 mg Per G Tube Q12H [address]   levOCARNitine 500 mg Per G Tube TID   melatonin 6 mg Oral HS   Perampanel 8 mg Oral Daily   rufinamide 1,600 mg Per G Tube BID   senna-docusate sodium 1 tablet Per G Tube Daily   topiramate 250 mg Per G Tube BID   Valproic Acid 250 mg Per G Tube Q8H       Continuous Infusions:   PRN Meds:  acetaminophen    magnesium hydroxide    ondansetron     Physical Exam   HR:  [66-91] 85  Resp:  [14-19] 18  BP: ()/(62-83) 99/83  SpO2:  [95 %-99 %] 97 %  On right side with face into bed  Right eye open while sleeping with lid folded up  + scleral injection R eye  Abdomen soft  PEG site intact, no erythema  Neurologic Exam  Mental Status: Sleeping  No response to voice  Opens eyes briefly for a few seconds in response to painful stimulation  With repeated stimulation she opens for a while longer and briefly looks around the room       Language: Nonverbal      Cranial Nerves: Pupils equal   No blink to threat, but tracks me briefly   Looks toward left and right, + occulocephalic response with good movements left and right when not aroused  Face appears symmetric       Motor: Decreased tone throughout  Moves all 4 extremities purposefully to painful stimulation  At least 3/5 in the uppers  At least 3/5 in the lowers       Sensory: Responds to pain in all extremities       Coordination: not able to test     Station and gait: not able to test     Reflexes: No ankle clonus  Both toes down with plantar stimulation  Test Results:  VEEG Results the last 24 hrs: Please see separate report  Impression:  Argenis Lucero is a 40 y o  woman with Harper Leghorn Syndrome, history of remote anoxic brain injury, past status epilepticus and severe intellectual disability who presents with increased somnolence  She is responsive to pain currently  She typically has multiple seizures per day (mostly brief/mild tonic seizures) as have been seen on her initial portion of EEG monitoring started last night  During her admission about a month ago there were episodes where she was only responsive to pain that did not necessarily correlate with increased seizure frequency  Differential for her decreased responsiveness in the setting of underlying chronic cerebral dysfunction includes systemic infection, medication effect (including possible excessive serum AED levels) and increased seizure burden  Her history of irregular sleep/wake cycle and inadequate sleep can also contribute  Recommendations:  1  Stop morning dose of Onfi to improve sedation (order placed)  Otherwise, continue current AEDs  She is on 6 AEDs at good doses  There is little room to escalate therapy which would likely have no/limited benefit and contribute to sedation  2  Valproate level 93  Free valproate, topiramate, levetiracetam and rufinamide levels have been ordered  3  Infectious/metabolic workup per primary team  Ammonia near baseline  UA unremarkable  She is at risk for aspiration PNA     4  R eye irritation with scleral injection, eyelid open during sleep this morning  Would monitor and have patient positioned so that eye is no propped open when face down  Consider ointment/drops for eyes  5  Seizure precautions  One-to-one supervision  Administer Ativan IV for motor seizure greater than 3 minutes  6  Continue VEEG monitoring to evaluate seizure burden for a total of 24-48 hours  7  Encourage appropriate sleep/wake cycle  Keep room dark and quiet at night  Engage/stimulate and keep room light during the day  Continue melatonin 6 mg at night  Total time spent today 42 minutes  Greater than 50% of total time was spent with the patient and / or family counseling and / or coordination of care  A description of the counseling / coordination of care: provided care at bedside   discussed case with nursing, PCA and primary team      Ysabel Mccord MD Date: 11/23/2017 Time: 11:43 AM

## 2017-11-23 NOTE — PROGRESS NOTES
Progress Note - Jakub Arias 40 y o  female MRN: [id number]    Unit/Bed#: [facility] 719-01 Encounter: [id number]        Seizure disorder [facility])   Assessment & Plan    Was on Onfi 10 mg in the morning and 20 mg at night - AM dose stopped  Keppra 1000 mg every 12 hours  Perampanel 8 mg daily   Banzel 1600 mg twice daily   Topamax 250 mg twice daily   valproic acid 250 mg every 8 hours  Appreciate neuro  EEG monitoring        * Somnolence   Assessment & Plan    Possibly 2/2 Onfi - dose reduced  NH3 near baseline  Hyperammonemia (HCC)   Assessment & Plan    2/2 depakote  Titrate lactulose to 3 BMS per day        Acute DANIEL (middle ear effusion)   Assessment & Plan    ENT consulted        Severe protein-calorie malnutrition ([facility] [address] )   Assessment & Plan    C/w Tube feeds  Restart oral pleasure feeds when more awake  Monitor electrolytes          Dysphagia   Assessment & Plan    NPO  On Tube feeds  When more awake, pleasure feeds level 3 thin            VTE Pharmacologic Prophylaxis:   Pharmacologic: Enoxaparin (Lovenox)  Mechanical: Mechanical VTE prophylaxis in place  Patient Centered Rounds: I have performed bedside rounds with nursing staff today  Discussions with Specialists or Other Care Team Provider: not today  Education and Discussions with Family / Patient: attempted to call sister but no answer  Time Spent for Care: 30 minutes  More than 50% of total time spent on counseling and coordination of care as described above  Current Length of Stay: 1 day(s)  Current Patient Status: Inpatient   Certification Statement: The patient will continue to require additional inpatient hospital stay due to need to titrate seizure meds    Discharge Plan: pending above  Code Status: Level 1 - Full Code    Subjective:   Pt seen and examined  Pt's somnolence improved  Near baseline        Objective:   Vitals:   Temp (24hrs), Av 7 °F (36 5 °C), Min:97 1 °F (36 2 °C), Max:98 2 °F (36 8 °C)    HR:  [66-91] 85  Resp: [14-18] 18  BP: ()/(62-83) 99/83  SpO2:  [97 %-99 %] 97 %  Body mass index is 17 01 kg/m²  Input and Output Summary (last 24 hours): Intake/Output Summary (Last 24 hours) at 11/23/17 1257  Last data filed at 11/23/17 0900   Gross per 24 hour   Intake                0 ml   Output                0 ml   Net                0 ml       Physical Exam:     Physical Exam   Constitutional: No distress  HENT:   Head: Normocephalic and atraumatic  Eyes: Conjunctivae and EOM are normal    Neck: Normal range of motion  Neck supple  Cardiovascular: Normal rate and regular rhythm  Pulmonary/Chest: Effort normal and breath sounds normal    Abdominal: Soft  Bowel sounds are normal  She exhibits no distension  There is no tenderness  Musculoskeletal: She exhibits no edema or tenderness  Neurological: She is alert  nonverbal   Skin: Skin is warm and dry  She is not diaphoretic  Additional Data:   Labs:    Results from last 7 days  Lab Units 11/23/17  0611  11/22/17  0915   WBC Thousand/uL 6 99  --  7 62   HEMOGLOBIN g/dL 12 9  --  13 2   HEMATOCRIT % 40 0  --  40 2   PLATELETS Thousands/uL 233  < > 219   NEUTROS PCT %  --   --  50   LYMPHS PCT %  --   --  36   MONOS PCT %  --   --  12   EOS PCT %  --   --  2   < > = values in this interval not displayed  Results from last 7 days  Lab Units 11/23/17  0611   SODIUM mmol/L 141   POTASSIUM mmol/L 3 4*   CHLORIDE mmol/L 108   CO2 mmol/L 26   BUN mg/dL 4*   CREATININE mg/dL 0 39*   CALCIUM mg/dL 8 9   TOTAL PROTEIN g/dL 6 7   BILIRUBIN TOTAL mg/dL 0 24   ALK PHOS U/L 82   ALT U/L 26   AST U/L 15   GLUCOSE RANDOM mg/dL 141*       Results from last 7 days  Lab Units 11/22/17  0915   INR  1 03       * I Have Reviewed All Lab Data Listed Above  * Additional Pertinent Lab Tests Reviewed:  [facility]    Imaging:    Imaging Reports Reviewed Today Include: none    Cultures:   Blood Culture:   Lab Results   Component Value Date    BLOODCX No Growth After 5 Days  10/10/2017    BLOODCX No Growth After 5 Days  10/10/2017    BLOODCX No Growth After 5 Days  07/07/2017    BLOODCX No Growth After 5 Days  07/07/2017    BLOODCX Bacillus species NOT anthracis 07/05/2017    BLOODCX No Growth After 5 Days  07/05/2017    BLOODCX No Growth After 5 Days  02/22/2017     Urine Culture: No results found for: URINECX  Sputum Culture: No components found for: SPUTUMCX  Wound Culture: No results found for: WOUNDCULT    Last 24 Hours Medication List:     artificial tear 1 application Ophthalmic TID   cloBAZam 20 mg Per NG Tube HS   enoxaparin 40 mg Subcutaneous Q24H   lactulose 30 g Oral TID   levETIRAcetam 1,000 mg Per G Tube Q12H [address]   levOCARNitine 500 mg Per G Tube TID   melatonin 6 mg Oral HS   Perampanel 8 mg Oral Daily   potassium chloride 40 mEq Per PEG Tube Once   rufinamide 1,600 mg Per G Tube BID   senna-docusate sodium 1 tablet Per G Tube Daily   topiramate 250 mg Per G Tube BID   Valproic Acid 250 mg Per G Tube Q8H        Today, Patient Was Seen By: Courtney Aragon DO    ** Please Note: Dragon 360 Dictation voice to text software may have been used in the creation of this document   **

## 2017-11-23 NOTE — PROCEDURES
Continuous Video EEG Monitoring       Patient Name:  Jeanette Lugo  MRN: [id number]   :  1980 File #: [id number]   Age: 40 y o  Encounter #: [id number]   Start Time: 2017 21:31 End Time: 2017 08:00        Report date: 2017          Study type: Continuous video EEG    ICD 10 diagnosis: Generalized epilepsy intractable with status G40 311 Lennox Gastaut syndrome      -------------------------------------------------------------------------------------------------------------------   Patient History: This recording was observed in a 40 y o  female with Atha Buddhist Syndrome and frequent tonic seizures to evaluate for interval change in seizure burden that may contribute to excessive sedation  Medications include:   artificial tear 1 application Ophthalmic TID   cloBAZam 20 mg Per NG Tube HS   enoxaparin 40 mg Subcutaneous Q24H   lactulose 30 g Oral TID   levETIRAcetam 1,000 mg Per G Tube Q12H [facility]   levOCARNitine 500 mg Per G Tube TID   melatonin 6 mg Oral HS   Perampanel 8 mg Oral Daily   rufinamide 1,600 mg Per G Tube BID   senna-docusate sodium 1 tablet Per G Tube Daily   topiramate 250 mg Per G Tube BID   Valproic Acid 250 mg Per G Tube Q8H       -------------------------------------------------------------------------------------------------------------------   Description of Procedure:  32 channel digital recording with electrodes placed according to the International 10-20 system with additional T1/T2 electrodes, EOG, EKG, and simultaneous video  A monitoring technologist supervised the continuous recording  The recording was technically satisfactory  -------------------------------------------------------------------------------------------------------------------   Results:   Manual Review:   During wakefulness there were was no definite posteriorly dominant rhythm that attenuated with eye opening   Instead, there were diffuse low amplitude theta activities often near 5 cps, periods of relative suppression with very low amplitude mixed activities  During sleep better regulated 5 cps theta activities attenuated and there were very low to low amplitude poorly regulated mixed frequency theta/alpha activities with intermittent delta and frequent spike/polyspike wave discharges       There were frequent bursts of arrhythmic generalized spike wave discharges that at times showed shifting lateralization  There were independent left and right temporal spikes  There were bursts of 2-2 5 cps rhythmic generalized discharges that at times correlated with mild eyelid myoclonia       The patient was asleep during the majority of this study  At times during sleep there were intermittent poorly regulated low to medium amplitude delta activities near 1-2 cps with overriding very low amplitude mixed frequency theta/alpha and beta  During other epochs there were diffuse low amplitude beta activity with intermittent mild bursts of mixed frequency theta/delta  Other findings:  Samples of the single channel ECG demonstrated a regular rhythm  Events:   Seizures involved diffuse medium to high amplitude paroxysmal fast activity and/or rhythmic diffuse spike wave discharges typically lasted between 3 and 15 seconds, with longer events demonstrating evolution in frequency  Events lasting longer than 5 seconds or more often had often had mild clinical correlate of eye opening and mild posturing of the R more than L UE  The seizures were relatively accurately detected by automated seizure detection [facility]  Some events lasted up to 30 seconds  The patient was asleep during most of the study and most seizures occurred during sleep  Seizure detections are noted below  Detection occurring within one minute of another detection are considered one event        Hour Seizures per Hour   22 4 (30 min)   23 16   0 26   1 14   2 15   3 24   4 16   5 19   6 23   7 21   Grand Total 178 -------------------------------------------------------------------------------------------------------------------   Interpretation: This prolonged, continuous video-EEG recording is abnormal       Background disorganization, theta frequency slowing and frequent generalized as well as independent left and right temporal epileptiform discharges are consistent with the diagnosis of Lennox Gastaut syndrome       During this 9 5 hour study there were approximately 178 electrographic seizures consisting of diffuse paroxysmal fast activity and/or rhythmic bursts of spike wave discharges lasting about 3 to 30 seconds in duration  Longer seizures often had clinical correlate that variably included, eyelid myoclonia and/or mild posturing of the upper extremities  Samples of seizures reviewed on video did not reveal more prominent tonic-clonic or clonic-tonic seizures that have been observed on past recordings, but not all seizures were reviewed on video  While similar to past periods of EEG recording, seizure frequency is likely higher on this study than most prior studies  Seizures have been sleep activated in the past and therefore increased seizure burden may be related to near continuous sleep       Herbert Lakhani MD   [id number] [facility]

## 2017-11-24 ENCOUNTER — GENERIC CONVERSION - ENCOUNTER (OUTPATIENT)
Dept: OTHER | Facility: OTHER | Age: 37
End: 2017-11-24

## 2017-11-24 ENCOUNTER — APPOINTMENT (INPATIENT)
Dept: NEUROLOGY | Facility: AMBULATORY SURGERY CENTER | Age: 37
DRG: 081 | End: 2017-11-24
Payer: MEDICARE

## 2017-11-24 PROBLEM — E87.6 HYPOKALEMIA: Status: ACTIVE | Noted: 2017-11-24

## 2017-11-24 PROBLEM — D53.9 MACROCYTIC ANEMIA: Status: ACTIVE | Noted: 2017-11-24

## 2017-11-24 LAB
ANION GAP SERPL CALCULATED.3IONS-SCNC: 7 MMOL/L (ref 4–13)
BUN SERPL-MCNC: 9 MG/DL (ref 5–25)
CALCIUM SERPL-MCNC: 8.8 MG/DL (ref 8.3–10.1)
CHLORIDE SERPL-SCNC: 108 MMOL/L (ref 100–108)
CO2 SERPL-SCNC: 27 MMOL/L (ref 21–32)
CREAT SERPL-MCNC: 0.42 MG/DL (ref 0.6–1.3)
ERYTHROCYTE [DISTWIDTH] IN BLOOD BY AUTOMATED COUNT: 12.7 % (ref 11.6–15.1)
GFR SERPL CREATININE-BSD FRML MDRD: 132 ML/MIN/1.73SQ M
GLUCOSE SERPL-MCNC: 162 MG/DL (ref 65–140)
HCT VFR BLD AUTO: 38 % (ref 34.8–46.1)
HGB BLD-MCNC: 12.3 G/DL (ref 11.5–15.4)
MAGNESIUM SERPL-MCNC: 2.2 MG/DL (ref 1.6–2.6)
MCH RBC QN AUTO: 32.6 PG (ref 26.8–34.3)
MCHC RBC AUTO-ENTMCNC: 32.4 G/DL (ref 31.4–37.4)
MCV RBC AUTO: 101 FL (ref 82–98)
MRSA NOSE QL CULT: ABNORMAL
MRSA NOSE QL CULT: ABNORMAL
PHOSPHATE SERPL-MCNC: 4 MG/DL (ref 2.7–4.5)
PLATELET # BLD AUTO: 215 THOUSANDS/UL (ref 149–390)
PMV BLD AUTO: 11.4 FL (ref 8.9–12.7)
POTASSIUM SERPL-SCNC: 3.4 MMOL/L (ref 3.5–5.3)
RBC # BLD AUTO: 3.77 MILLION/UL (ref 3.81–5.12)
SODIUM SERPL-SCNC: 142 MMOL/L (ref 136–145)
WBC # BLD AUTO: 7.98 THOUSAND/UL (ref 4.31–10.16)

## 2017-11-24 PROCEDURE — 84100 ASSAY OF PHOSPHORUS: CPT | Performed by: GENERAL PRACTICE

## 2017-11-24 PROCEDURE — 83735 ASSAY OF MAGNESIUM: CPT | Performed by: GENERAL PRACTICE

## 2017-11-24 PROCEDURE — 95951 HB EEG MONITORING/VIDEORECORD: CPT

## 2017-11-24 PROCEDURE — 85027 COMPLETE CBC AUTOMATED: CPT | Performed by: GENERAL PRACTICE

## 2017-11-24 PROCEDURE — 80048 BASIC METABOLIC PNL TOTAL CA: CPT | Performed by: GENERAL PRACTICE

## 2017-11-24 RX ORDER — POTASSIUM CHLORIDE 20MEQ/15ML
40 LIQUID (ML) ORAL DAILY
Status: DISCONTINUED | OUTPATIENT
Start: 2017-11-24 | End: 2017-11-27 | Stop reason: HOSPADM

## 2017-11-24 RX ADMIN — TOPIRAMATE 250 MG: 100 TABLET, FILM COATED ORAL at 08:54

## 2017-11-24 RX ADMIN — LEVOCARNITINE 500 MG: 1 SOLUTION ORAL at 17:00

## 2017-11-24 RX ADMIN — LEVOCARNITINE 500 MG: 1 SOLUTION ORAL at 21:36

## 2017-11-24 RX ADMIN — LACTULOSE 30 G: 20 SOLUTION ORAL at 08:52

## 2017-11-24 RX ADMIN — POTASSIUM CHLORIDE 40 MEQ: 20 SOLUTION ORAL at 11:56

## 2017-11-24 RX ADMIN — LEVETIRACETAM 1000 MG: 100 SOLUTION ORAL at 08:54

## 2017-11-24 RX ADMIN — LACTULOSE 30 G: 20 SOLUTION ORAL at 16:57

## 2017-11-24 RX ADMIN — CLOBAZAM 20 MG: 10 TABLET ORAL at 21:35

## 2017-11-24 RX ADMIN — RUFINAMIDE 1600 MG: 200 TABLET, FILM COATED ORAL at 08:55

## 2017-11-24 RX ADMIN — LEVOCARNITINE 500 MG: 1 SOLUTION ORAL at 08:55

## 2017-11-24 RX ADMIN — ENOXAPARIN SODIUM 40 MG: 40 INJECTION SUBCUTANEOUS at 16:59

## 2017-11-24 RX ADMIN — PERAMPANEL 8 MG: 4 TABLET ORAL at 08:58

## 2017-11-24 RX ADMIN — VALPROIC ACID 250 MG: 250 SOLUTION ORAL at 00:07

## 2017-11-24 RX ADMIN — MINERAL OIL AND WHITE PETROLATUM 1 APPLICATION: 150; 830 OINTMENT OPHTHALMIC at 08:54

## 2017-11-24 RX ADMIN — VALPROIC ACID 250 MG: 250 SOLUTION ORAL at 16:58

## 2017-11-24 RX ADMIN — MELATONIN TAB 3 MG 6 MG: 3 TAB at 21:36

## 2017-11-24 RX ADMIN — LACTULOSE 30 G: 20 SOLUTION ORAL at 21:33

## 2017-11-24 RX ADMIN — LEVETIRACETAM 1000 MG: 100 SOLUTION ORAL at 21:36

## 2017-11-24 RX ADMIN — Medication 1 TABLET: at 08:54

## 2017-11-24 RX ADMIN — TOPIRAMATE 250 MG: 100 TABLET, FILM COATED ORAL at 16:55

## 2017-11-24 RX ADMIN — MINERAL OIL AND WHITE PETROLATUM 1 APPLICATION: 150; 830 OINTMENT OPHTHALMIC at 17:00

## 2017-11-24 RX ADMIN — VALPROIC ACID 250 MG: 250 SOLUTION ORAL at 08:52

## 2017-11-24 NOTE — CONSULTS
Consultation - ENT   Chika Santacruz 40 y o  female MRN: [id number]  Unit/Bed#: [facility] 719-01 Encounter: [id number]      Assessment/Plan      40years old woman with significant mental impairment and associated seizure disorder  She is nonverbal   She does have a very severe septal deviation  Regarding her ear disease this has been intermittently present, but there is documented imaging of complete resolution of the episodes that have been detected  Despite my review of the charts I am not clear on the date that she had the tympanostomy tube placed, or the treatments that have been given on the episodes of middle ear disease  Due to her mental status it is unclear if she presents any associated ear pain or decreased hearing level  She does have tympanostomy tubes in currently the middle ear appears to be and a better condition of functional level as compared to the CT scan that was performed 2 days ago  No leukocytosis has been present on the  CBC  No treatment appears to be actually needed at this point  I suspect that this is mostly an incidental finding, likely viral self limited URI  History of Present Illness   Physician Requesting Consult: Archana Dumont DO  Reason for Consult / Principal Problem:  Middle ear effusion  HPI: Chika Santacruz is a 40y o  year old female with a history of Lennox Gastaut Syndrome, remote history of anoxic brain injury, status epilepticus s/p VNS, hyperammonemia, and severe intellectual disability  resident at nursing home, who was brought to the emergency room due to excessive somnolence  On EMS arrival, patient was responsive only to pain  This part of the workup a CT scan of the head was performed  This revealed the presence of fluid on the left mastoid and middle ear  ENT consultation is requested  Consults neurology consultation reviewed    Review of Systems   Unable to perform due to patient's baseline disability/ mental status          Historical Information Past Medical History:   Diagnosis Date    ADHD     Anoxic brain damage ([facility] [address] )     Autistic disorder     Epilepsy (HCC)     Hyperammonemia (HCC)     Hyperkeratosis     Hypotension     Hypotension     Intellectual disability     Intellectual disability     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with tonic seizures (HCC)     Lethargy     Liver enzyme elevation     Onychomycosis     Osteoporosis     Osteoporosis     Psychiatric disorder     anxiety    Seizures (HCC)      Past Surgical History:   Procedure Laterality Date    CARDIAC PACEMAKER PLACEMENT      JEJUNOSTOMY FEEDING TUBE      PEG TUBE PLACEMENT       Social History   History   Alcohol Use No     History   Drug Use No     History   Smoking Status    Never Smoker   Smokeless Tobacco    Never Used     Family History: No family history on file      Meds/Allergies   Current Facility-Administered Medications   Medication Dose Route Frequency    acetaminophen (TYLENOL) tablet 650 mg  650 mg Per G Tube Q6H PRN    artificial tear (LUBRIFRESH P M ) ophthalmic ointment 1 application  1 application Ophthalmic TID    cloBAZam (ONFI) tablet 20 mg  20 mg Per NG Tube HS    enoxaparin (LOVENOX) subcutaneous injection 40 mg  40 mg Subcutaneous Q24H    lactulose 20 g/30 mL oral solution 30 g  30 g Oral TID    levETIRAcetam (KEPPRA) oral solution 1,000 mg  1,000 mg Per G Tube Q12H [address]    levOCARNitine (CARNITOR) oral solution 500 mg  500 mg Per G Tube TID    magnesium hydroxide (MILK OF MAGNESIA) 400 mg/5 mL oral suspension 30 mL  30 mL Per G Tube PRN    melatonin tablet 6 mg  6 mg Oral HS    ondansetron (ZOFRAN) injection 4 mg  4 mg Intravenous Q6H PRN    Perampanel tablet 8 mg  8 mg Oral Daily    rufinamide (BANZEL) tablet 1,600 mg  1,600 mg Per G Tube BID    senna-docusate sodium (SENOKOT S) 8 6-50 mg per tablet 1 tablet  1 tablet Per G Tube Daily    topiramate (TOPAMAX) tablet 250 mg  250 mg Per G Tube BID    Valproic Acid (DEPAKENE) 250 MG/5ML oral soln 250 mg  250 mg Per G Tube Q8H         Allergies   Allergen Reactions    Felbamate        Objective       Physical Exam   Blood pressure 99/72, pulse 90, temperature 99 °F (37 2 °C), temperature source Axillary, resp  rate 18, height 5' (1 524 m), weight 39 5 kg (87 lb 1 3 oz), SpO2 96 %  Constitutional:  Patient nonverbal,  Well-developed and well-nourished, no apparent distress, non-toxic appearance  Voice:  Nonverbal  Head:  Currently with permanent EEG monitoring  Face: Symmetric, no edema,  Right Ear: External ear normal   Auditory canal clear  Tympanic membrane well-appearing, there is a partially extruded tympanostomy tube, No fluid present  No post-auricular erythema or tenderness  Left Ear: External ear normal   Auditory canal with partial wax impaction,   Tympanic membrane well-appearing, without retraction or scarring  There is a Paparella type tympanostomy tube in situ, it appears to be patent  Currently middle ear appears to have no evident effusion  No post-auricular erythema or tenderness  Nose: Septum severely deviated Mucosa moist, turbinates normal size, no edema  no discharge evident  Oral cavity:  Patient refuses to open the mouth  Neck: Normal laryngeal elevation with swallow  Trachea midline  No masses or lesions  No palpable adenopathy        Lab Results: CBC:   Lab Results   Component Value Date    WBC 7 98 11/24/2017    HGB 12 3 11/24/2017    HCT 38 0 11/24/2017     (H) 11/24/2017     11/24/2017    MCH 32 6 11/24/2017    MCHC 32 4 11/24/2017    RDW 12 7 11/24/2017    MPV 11 4 11/24/2017   , CMP:   Lab Results   Component Value Date     11/24/2017    K 3 4 (L) 11/24/2017     11/24/2017    CO2 27 11/24/2017    ANIONGAP 7 11/24/2017    BUN 9 11/24/2017    CREATININE 0 42 (L) 11/24/2017    GLUCOSE 162 (H) 11/24/2017    CALCIUM 8 8 11/24/2017    EGFR 132 11/24/2017     Imaging Studies: I have personally reviewed images on the PACS system and :  Several CT scans are available on the wipe portal:  Most recent CT scan reveals a left mastoid with fluid, middle ear also with fluid, no bone erosion  Contralateral ear does have a tympanostomy tube that is visible on the CT scan  Currently there is no sinusitis  A very severe right septal deviation is noticed on all CT scans  Review of old CT scans dating back to February of 2013 reveals no middle ear effusion or sinus disease in the CT scans of February 25, 2013 or April 25, 2013  There is a left mastoid opacification with no associated sinusitis on November 11/2013, a CT scan performed on February 22, 2017 reveals mucosal thickening on the ethmoid sinuses as well as an air-fluid level on the left sphenoid but no mastoiditis or otitis media  EKG, Pathology, and   Other Studies: I have personally reviewed pertinent reports and noncontributory        Code Status: Level 1 - Full Code  Advance Directive and Living Will:      Power of :    POLST:      Counseling/Coordination of Care: Total floor / unit time spent today 60 minutes  Greater than 50% of total time was spent with the patient and / or family counseling and / or coordination of care   A description of the counseling / coordination of care: Discussed case with Dr Chriss Sharp from [facility]

## 2017-11-24 NOTE — SOCIAL WORK
CM was informed by Dr Marcelle Roberts that pt is a tentative dc for tomorrow  CM called [facility] and spoke to Sienna to discuss same  Sienna inform CM that they are unable to setup for 24hr supervision for this weekend as they're short staff Sienna states they're able to accept pt on Monday 11/27  CM informed Dr Marcelle Roberts and Dr Adebayo Ramirez of same

## 2017-11-24 NOTE — PROGRESS NOTES
Progress Note - Paz Gary 40 y o  female MRN: [id number]    Unit/Bed#: [facility] 719-01 Encounter: [id number]        Seizure disorder [facility])   Assessment & Plan    Was on Onfi 10 mg in the morning and 20 mg at night - AM dose stopped  Keppra 1000 mg every 12 hours  Perampanel 8 mg daily   Banzel 1600 mg twice daily   Topamax 250 mg twice daily   valproic acid 250 mg every 8 hours  S/p VNS  Appreciate neuro  EEG monitoring showed seizure activity        * Somnolence   Assessment & Plan    Possibly 2/2 Onfi - morning dose stopped 11/23  NH3 near baseline  - recheck in AM        Acute DANIEL (middle ear effusion)   Assessment & Plan    ENT consult appreciated  Likely related to viral URI  Pt had tube PTA        Hyperammonemia (HCC)   Assessment & Plan    2/2 depakote  Titrate lactulose to 3 BMS per day  Recheck NH3 tomorrow        Severe protein-calorie malnutrition (HCC)   Assessment & Plan    C/w Tube feeds  Restart oral pleasure feeds when more awake  Monitor electrolytes          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HCC)   Assessment & Plan    Seizure management as above        Dysphagia   Assessment & Plan    NPO  On Tube feeds  When more awake, pleasure feeds level 3 thin        Macrocytic anemia   Assessment & Plan    Hgb above baseline 11-12  Check B12 and folate tomorrow        Hypokalemia   Assessment & Plan    Started on daily supplement through PEG  Likely 2/2 malnutrition and lactulose        Intellectual disability   Assessment & Plan    Pt nonverbal at baseline              VTE Pharmacologic Prophylaxis:   Pharmacologic: Enoxaparin (Lovenox)  Mechanical: Mechanical VTE prophylaxis in place  Patient Centered Rounds: I have performed bedside rounds with nursing staff today  Discussions with Specialists or Other Care Team Provider: neuro and ENT  Education and Discussions with Family / Patient: attempted to call father but no answer  Time Spent for Care: 20 minutes    More than 50% of total time spent on counseling and coordination of care as described above  Current Length of Stay: 2 day(s)  Current Patient Status: Inpatient   Certification Statement: The patient will continue to require additional inpatient hospital stay due to above - d/c Monday    Discharge Plan: d/c Monday  Code Status: Level 1 - Full Code    Subjective:   PT seen and examined  Was awake before I saw her  Objective:   Vitals:   Temp (24hrs), Av °F (36 7 °C), Min:97 2 °F (36 2 °C), Max:99 °F (37 2 °C)    HR:  [81-90] 90  Resp:  [16-18] 18  BP: (85-99)/(55-72) 99/72  SpO2:  [96 %-98 %] 96 %  Body mass index is 17 01 kg/m²  Input and Output Summary (last 24 hours): Intake/Output Summary (Last 24 hours) at 17 1524  Last data filed at 17 1300   Gross per 24 hour   Intake              115 ml   Output              424 ml   Net             -309 ml       Physical Exam:     Physical Exam   Constitutional: No distress  HENT:   Head: Normocephalic and atraumatic  Eyes: Conjunctivae and EOM are normal    Neck: Normal range of motion  Neck supple  Cardiovascular: Normal rate and regular rhythm  Pulmonary/Chest: Effort normal and breath sounds normal    Abdominal: Soft  Bowel sounds are normal  She exhibits no distension  There is no tenderness  Musculoskeletal: Normal range of motion  She exhibits no edema  Neurological:   asleep   Skin: Skin is warm and dry  She is not diaphoretic  Additional Data:   Labs:    Results from last 7 days  Lab Units 17  0539  17  0915   WBC Thousand/uL 7 98  < > 7 62   HEMOGLOBIN g/dL 12 3  < > 13 2   HEMATOCRIT % 38 0  < > 40 2   PLATELETS Thousands/uL 215  < > 219   NEUTROS PCT %  --   --  50   LYMPHS PCT %  --   --  36   MONOS PCT %  --   --  12   EOS PCT %  --   --  2   < > = values in this interval not displayed      Results from last 7 days  Lab Units 17  0539 17  0611   SODIUM mmol/L 142 141   POTASSIUM mmol/L 3 4* 3 4*   CHLORIDE mmol/L 108 108 CO2 mmol/L 27 26   BUN mg/dL 9 4*   CREATININE mg/dL 0 42* 0 39*   CALCIUM mg/dL 8 8 8 9   TOTAL PROTEIN g/dL  --  6 7   BILIRUBIN TOTAL mg/dL  --  0 24   ALK PHOS U/L  --  82   ALT U/L  --  26   AST U/L  --  15   GLUCOSE RANDOM mg/dL 162* 141*       Results from last 7 days  Lab Units 11/22/17  0915   INR  1 03       * I Have Reviewed All Lab Data Listed Above  * Additional Pertinent Lab Tests Reviewed: [address] Admission Reviewed    Imaging:    Imaging Reports Reviewed Today Include: n/a    Cultures:   Blood Culture:   Lab Results   Component Value Date    BLOODCX No Growth at 24 hrs  11/22/2017    BLOODCX No Growth at 24 hrs  11/22/2017    BLOODCX No Growth After 5 Days  10/10/2017    BLOODCX No Growth After 5 Days  10/10/2017    BLOODCX No Growth After 5 Days  07/07/2017    BLOODCX No Growth After 5 Days  07/07/2017    BLOODCX Bacillus species NOT anthracis 07/05/2017     Urine Culture: No results found for: URINECX  Sputum Culture: No components found for: SPUTUMCX  Wound Culture: No results found for: WOUNDCULT    Last 24 Hours Medication List:     artificial tear 1 application Ophthalmic TID   cloBAZam 20 mg Per NG Tube HS   enoxaparin 40 mg Subcutaneous Q24H   lactulose 30 g Oral TID   levETIRAcetam 1,000 mg Per G Tube Q12H [address]   levOCARNitine 500 mg Per G Tube TID   melatonin 6 mg Oral HS   Perampanel 8 mg Oral Daily   potassium chloride 40 mEq Per PEG Tube Daily   rufinamide 1,600 mg Per G Tube BID   senna-docusate sodium 1 tablet Per G Tube Daily   topiramate 250 mg Per G Tube BID   Valproic Acid 250 mg Per G Tube Q8H        Today, Patient Was Seen By: Anderson Cunningham DO    ** Please Note: Dragon 360 Dictation voice to text software may have been used in the creation of this document   **

## 2017-11-24 NOTE — PROCEDURES
Continuous Video EEG Monitoring       Patient Name:  Jean Carlos Grewal  MRN: [id number]   :  1980 File #: [id number]   Age: 40 y o  Encounter #: [id number]   Start Time: 2017 08 End Time: 2017 08:00        Report date: 2017          Study type: Continuous video EEG    ICD 10 diagnosis: Generalized epilepsy intractable with status G40 311 Lennox Gastaut syndrome      -------------------------------------------------------------------------------------------------------------------   Patient History: This recording was observed in a 40 y o  female with Renetta Roly Syndrome and frequent tonic seizures to evaluate for interval change in seizure burden that may contribute to excessive sedation  Medications include:     artificial tear 1 application Ophthalmic TID   cloBAZam 20 mg Per NG Tube HS   enoxaparin 40 mg Subcutaneous Q24H   lactulose 30 g Oral TID   levETIRAcetam 1,000 mg Per G Tube Q12H [address]   levOCARNitine 500 mg Per G Tube TID   melatonin 6 mg Oral HS   Perampanel 8 mg Oral Daily   potassium chloride 40 mEq Per PEG Tube Daily   rufinamide 1,600 mg Per G Tube BID   senna-docusate sodium 1 tablet Per G Tube Daily   topiramate 250 mg Per G Tube BID   Valproic Acid 250 mg Per G Tube Q8H       -------------------------------------------------------------------------------------------------------------------   Description of Procedure:  32 channel digital recording with electrodes placed according to the International 10-20 system with additional T1/T2 electrodes, EOG, EKG, and simultaneous video  A monitoring technologist supervised the continuous recording  The recording was technically satisfactory  -------------------------------------------------------------------------------------------------------------------   Results:   Manual Review:   During wakefulness there were was no definite posteriorly dominant rhythm that attenuated with eye opening   Instead, there were diffuse low amplitude theta activities often near 5 cps, periods of relative suppression with very low amplitude mixed activities  During sleep better regulated 5 cps theta activities attenuated and there were very low to low amplitude poorly regulated mixed frequency theta/alpha activities with intermittent delta and frequent spike/polyspike wave discharges       There were frequent bursts of arrhythmic generalized spike wave discharges that at times showed shifting lateralization  There were independent left and right temporal spikes  There were bursts of 2-2 5 cps rhythmic generalized discharges that at times correlated with mild eyelid myoclonia       The patient was asleep during the majority of this study  At times during sleep there were intermittent poorly regulated low to medium amplitude delta activities near 1-2 cps with overriding very low amplitude mixed frequency theta/alpha and beta  During other epochs there were diffuse low amplitude beta activity with intermittent mild bursts of mixed frequency theta/delta  Other findings:  Samples of the single channel ECG demonstrated a regular rhythm  Events:   Seizures involved diffuse medium to high amplitude paroxysmal fast activity and/or rhythmic diffuse spike wave discharges typically lasted between 3 and 15 seconds, with longer events demonstrating evolution in frequency  Events lasting longer than 5 seconds or more often had often had mild clinical correlate of eye opening and mild posturing of the R more than L UE  The seizures were relatively accurately detected by automated seizure detection Niharika Hi  Some events lasted up to 30 seconds  Most seizures occurred during sleep  Hours with fewer seizure detections (11:00-12:00, 15:00-22:00) correspond with epochs that primarily contain wakefulness  Seizure detections are noted below  Detection occurring within one minute of another detection are considered one event        Hour Seizure Count   8 6   9 16 10 11   11 6   12 9   13 11   14 20   15 5   16 4   17 8   18 6   19 1   20 3   22 6   23 22   0 18   1 13   2 15   3 13   4 11   5 11   6 11   7 11   Grand Total 237         -------------------------------------------------------------------------------------------------------------------   Interpretation: This prolonged, continuous video-EEG recording is abnormal       Background disorganization, theta frequency slowing and frequent generalized as well as independent left and right temporal epileptiform discharges are consistent with the diagnosis of Lennox Gastaut syndrome       During this 24 hour study there were approximately 237 electrographic seizures consisting of diffuse paroxysmal fast activity and/or rhythmic bursts of spike wave discharges lasting about 3 to 30 seconds in duration  Longer seizures often had clinical correlate that variably included, eyelid myoclonia and/or mild posturing of the upper extremities  Samples of seizures reviewed on video did not reveal more prominent tonic-clonic or clonic-tonic seizures that have been observed on past recordings, but not all seizures were reviewed on video  Hourly seizure burden is improved on this study compared to the prior recording period and this may in part be explained by increased wakefulness (her seizures increase in sleep)        Tami Jarvis MD   [id number] [facility]

## 2017-11-24 NOTE — PLAN OF CARE
Problem: DISCHARGE PLANNING - CARE MANAGEMENT  Goal: Discharge to post-acute care or home with appropriate resources  INTERVENTIONS:  - Conduct assessment to determine patient/family and health care team treatment goals, and need for post-acute services based on payer coverage, community resources, and patient preferences, and barriers to discharge  - Address psychosocial, clinical, and financial barriers to discharge as identified in assessment in conjunction with the patient/family and health care team  - Arrange appropriate level of post-acute services according to patient's   needs and preference and payer coverage in collaboration with the physician and health care team  - Communicate with and update the patient/family, physician, and health care team regarding progress on the discharge plan  - Arrange appropriate transportation to post-acute venues  - Plan for discharge to [facility]    Outcome: Progressing

## 2017-11-24 NOTE — PROGRESS NOTES
Patient laying in bed comfortably  1:1 PCA coverage present  No signs of distress/pain  IV patent  PEG tube patent   Will continue to monitor patient

## 2017-11-24 NOTE — CASE MANAGEMENT
Initial Clinical Review    Admission: Date/Time/Statement: 11/22/17 @ 1401     Orders Placed This Encounter   Procedures    Inpatient Admission     Standing Status:   Standing     Number of Occurrences:   1     Order Specific Question:   Admitting Physician     Answer:   Sienna Junior [[id number]]     Order Specific Question:   Level of Care     Answer:   Med Surg [[id number]]     Order Specific Question:   Estimated length of stay     Answer:   More than 2 Midnights     Order Specific Question:   Certification     Answer:   I certify that inpatient services are medically necessary for this patient for a duration of greater than two midnights  See H&P and MD Progress Notes for additional information about the patient's course of treatment  Chief Complaint: Somnolence    History of Illness: Jeanette Lugo is a 40 y o  female with a history of Lennox Gastaut Syndrome, remote history of anoxic brain injury, status epilepticus s/p VNS, hyperammonemia, and severe intellectual disability who presents from nursing home with excessive somnolence  On EMS arrival, patient was responsive only to pain  Patient unable to provide history given somnolence  She follows with Dr Rebecca Rose from Neurology      She originally presented to  [facility]  Her ammonia level was noted to be 75  Head CT unremarkable  She was transferred to [facility] for continuous video EEG monitoring      ED Vital Signs:   ED Triage Vitals   Temperature Pulse Respirations Blood Pressure SpO2   11/22/17 1450 11/22/17 1450 11/22/17 1450 11/22/17 1918 11/22/17 1450   (!) 97 1 °F (36 2 °C) 84 14 93/64 99 %      Temp Source Heart Rate Source Patient Position - Orthostatic VS BP Location FiO2 (%)   11/22/17 1450 11/22/17 1918 11/22/17 1450 11/22/17 1450 --   Axillary Monitor Lying Right arm       Pain Score       11/22/17 1514       No Pain        Wt Readings from Last 1 Encounters:   11/22/17 39 5 kg (87 lb 1 3 oz)       Vital Signs (abnormal):   Date/Time Temp  Pulse  Resp  BP  SpO2  O2 Device  Patient Position - Orthostatic VS   11/24/17 1033  99 °F (37 2 °C)  90  18  99/72  --  --  Lying   11/24/17 0734  98 2 °F (36 8 °C)  81  16   85/55  96 %  None (Room air)  Lying   11/24/17 0100   97 2 °F (36 2 °C)  83  16  97/64  98 %  None (Room air)  Lying   11/24/17 0000  --  --  --  --  --  None (Room air)  --   11/23/17 1542   97 4 °F (36 3 °C)  83  16   85/60  96 %  None (Room air)  Lying   11/23/17 0707  98 2 °F (36 8 °C)  85  18  99/83  97 %  None (Room air)  Lying   11/23/17 0007  97 6 °F (36 4 °C)  66  18  101/62  97 %  None (Room air)  Lying   11/23/17 0000  --  --  --  --  --  None (Room air)  --   11/22/17 1918  97 7 °F (36 5 °C)  91  18  93/64  98 %  None (Room air)  Lying   11/22/17 1450   97 1 °F (36 2 °C)  84  14  --  99 %  None (Room air)  Lying         Abnormal Labs/Diagnostic Test Results:   Ref Range & Units 11/24/17 0539 11/23/17 0611   Sodium 136 - 145 mmol/L 142  141    Potassium 3 5 - 5 3 mmol/L 3 4   3 4     Chloride 100 - 108 mmol/L 108  108    CO2 21 - 32 mmol/L 27  26    Anion Gap 4 - 13 mmol/L 7  7    BUN 5 - 25 mg/dL 9  4     Creatinine 0 60 - 1 30 mg/dL 0 42   0 39CM     Glucose 65 - 140 mg/dL 162   141CM     Calcium 8 3 - 10 1 mg/dL 8 8  8 9    eGFR ml/min/1 73sq m 132  136               Ref Range & Units 11/24/17 0539 11/23/17 0611   WBC 4 31 - 10 16 Thousand/uL 7 98  6 99    RBC 3 81 - 5 12 Million/uL 3 77   4 00    Hemoglobin 11 5 - 15 4 g/dL 12 3  12 9    Hematocrit 34 8 - 46 1 % 38 0  40 0    MCV 82 - 98 fL 101   100     MCH 26 8 - 34 3 pg 32 6  32 3    MCHC 31 4 - 37 4 g/dL 32 4  32 3    RDW 11 6 - 15 1 % 12 7  12 6    Platelets 030 - 590 Thousands/uL 215  233    MPV 8 9 - 12 7 fL 11 4  11 3               Ref Range & Units 11/23/17 0611 11/22/17 0915 10/24/17 0524 10/22/17 0551 10/21/17 0548   Ammonia 11 - 35 umol/L 83   75CM   59CM   55CM   78CM                 Past Medical/Surgical History:    Past Medical History:   Diagnosis Date    ADHD     Anoxic brain damage (HCC)     Autistic disorder     Epilepsy (HCC)     Hyperammonemia (HCC)     Hyperkeratosis     Hypotension     Hypotension     Intellectual disability     Intellectual disability     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with tonic seizures (HCC)     Lethargy     Liver enzyme elevation     Onychomycosis     Osteoporosis     Osteoporosis     Psychiatric disorder     Seizures (HCC)        Admitting Diagnosis: Seizures ([facility] [address] ) [R56 9]    Age/Sex: 40 y o  female    Assessment/Plan:   Plan for the Primary Problem(s):  · Somnolence:  ? Continuous EEG monitoring  ? Neurology consult  ? May need medication adjustments  She had recent medication changes on 11/7  ? Neuro checks  · Hyperammonemia:  ? This is chronic  ? Patient is on lactulose and L-carnitine  ? Ammonia 75  Will increase lactulose  Monitor ammonia      Plan for Additional Problems:   · Lennox Gastaut Syndrome, epilepsy s/p VNS:  ? Current lay on Onfi 10 mg in the morning and 20 mg at night, Keppra 1000 mg every 12 hours, Perampanel 8 mg daily, Banzel 1600 mg twice daily, Topamax 250 mg twice daily, and valproic acid 250 mg every 8 hours  ? Check medication levels  ? She had recent medication changes by Dr Keith Herron on 11/7/17  ? Neurology following  · Intellectual disability:  ? Resides at nursing home  ? Requires continuous one-to-one observation  · Dysphagia:  ? On PEG feeds with pleasure feedings when awake (Level 3 dysphagia, thin liquids)  ? Hold po diet at this time  · Severe protein calorie malnutrition:  ? Will place on tube feedings from last hospitalization  ?  Nutrition for recs    Admission Orders:  Scheduled Meds:   artificial tear 1 application Ophthalmic TID   cloBAZam 20 mg Per NG Tube HS   enoxaparin 40 mg Subcutaneous Q24H   lactulose 30 g Oral TID   levETIRAcetam 1,000 mg Per G Tube Q12H [facility]   levOCARNitine 500 mg Per G Tube TID   melatonin 6 mg Oral HS   Perampanel 8 mg Oral Daily   potassium chloride 40 mEq Per PEG Tube Daily   rufinamide 1,600 mg Per G Tube BID   senna-docusate sodium 1 tablet Per G Tube Daily   topiramate 250 mg Per G Tube BID   Valproic Acid 250 mg Per G Tube Q8H     Continuous Infusions:    PRN Meds:   acetaminophen    magnesium hydroxide    ondansetron    Nursing orders - VS q 4 - Neuro checks q 4 - Sequential compression device to le's- up with assisatnce- 1:1 continual observation - Diet enteral feeding - jevity 1 2 cl 0 water q 6 - Video EEG monitoring Protocols     Consult Neurology Trevin Ayala is a 40 y o  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 ER UA was unremarkable  Ammonia was 75 (chornically elevated)  Head CT was unremarkable  [facility] for possible EEG monitoring and higher level of care       Additional detail regarding her epilepsy history is available in Dr Muro Pulling 11/7/17 office note       Current AEDs:   Banzel 1600mg twice a day   Fycompa 8mg daily   Levetiracetam oral solution 1000mg twice a day   Onfi 10 mg AM / 20 mg PM  Topiramate 250mg BID    Valproic acid 250mg q8hrs      Additional medications include:   Levocarnitine 500mg TID    Recommendations:  1  Continue prior to admission antiepileptic medications (listed in HPI, orders placed)  Supply confirmed with pharmacy  2  Valproate level 93  Free valproate, topiramate, levetiracetam and rufinamide levels have been ordered     3  Infectious/metabolic workup per primary team  Ammonia near baseline  UA unremarkable  She is at risk for aspiration PNA  4  Seizure precautions  One-to-one supervision  Administer Ativan IV for motor seizure greater than 3 minutes  5  Will consider 24 hour period of VEEG monitoring to evaluate seizure burden based on clinical course over the next 12-24 hours  Encourage appropriate sleep/wake cycle  Keep room dark and quiet at night  Engage/stimulate and keep room light during the day  Continue melatonin 6 mg at night  ENT  - 40years old woman with significant mental impairment and associated seizure disorder  She is nonverbal   She does have a very severe septal deviation  Regarding her ear disease this has been intermittently present, but there is documented imaging of complete resolution of the episodes that have been detected  Despite my review of the charts I am not clear on the date that she had the tympanostomy tube placed, or the treatments that have been given on the episodes of middle ear disease  Due to her mental status it is unclear if she presents any associated ear pain or decreased hearing level  She does have tympanostomy tubes in currently the middle ear appears to be and a better condition of functional level as compared to the CT scan that was performed 2 days ago  No leukocytosis has been present on the  CBC  No treatment appears to be actually needed at this point    I suspect that this is mostly an incidental finding, likely viral self limited URI

## 2017-11-24 NOTE — PROGRESS NOTES
Epilepsy Consult Follow-up Note  Patient Name: Orlena Duane  MRN: [id number]  Date: 11/24/17 Time: 3:24 PM     LOS: 2 days     Interval History:  More awake yesterday  Intermittently awake this morning  Somnolent at 3:20 pm arouses to mild pain, but then falls asleep quickly  Current AEDs:   Banzel 1600mg twice a day   Fycompa 8mg daily   Levetiracetam oral solution 1000mg twice a day   Onfi 20 mg PM  Topiramate 250mg BID    Valproic acid 250mg q8hrs      Additional medications include:   Levocarnitine 500mg TID     Summary of Events/Seizures:  Multiple electrographic seizures with and without clinical correlate (eye opening, arm posturing) overnight  Seizures more frequent during sleep (patient is often asleep)  Seizure burden decreased during 24 hour period ending at 8 am today  See VEEG report  Medications     artificial tear 1 application Ophthalmic TID   cloBAZam 20 mg Per NG Tube HS   enoxaparin 40 mg Subcutaneous Q24H   lactulose 30 g Oral TID   levETIRAcetam 1,000 mg Per G Tube Q12H [facility]   levOCARNitine 500 mg Per G Tube TID   melatonin 6 mg Oral HS   Perampanel 8 mg Oral Daily   potassium chloride 40 mEq Per PEG Tube Daily   rufinamide 1,600 mg Per G Tube BID   senna-docusate sodium 1 tablet Per G Tube Daily   topiramate 250 mg Per G Tube BID   Valproic Acid 250 mg Per G Tube Q8H       Continuous Infusions:   PRN Meds:  acetaminophen    magnesium hydroxide    ondansetron     Physical Exam   HR:  [81-90] 90  Resp:  [16-18] 18  BP: (85-99)/(55-72) 99/72  SpO2:  [96 %-98 %] 96 %  No distress  Eyes closed  Abdomen soft, non-tender  PEG intact  Neurologic Exam  Mental Status: Sleeping  No response to voice  Opens eyes briefly for a few seconds in response to mild painful stimulation   With repeated stimulation she opens for a while longer and briefly looks around the room       Language: Nonverbal      Cranial Nerves: Pupils equal   + occulocephalic responses with good movements left and right when not aroused  Face appears symmetric       Motor: Decreased tone throughout  Moves all 4 extremities purposefully to painful stimulation  At least 3/5 in the uppers  At least 3/5 in the lowers       Sensory: Responds to pain in all extremities       Coordination: not able to test     Station and gait: not able to test     Reflexes: No ankle clonus  Both toes down with plantar stimulation  Test Results:  VEEG Results the last 24 hrs: Please see separate report  Impression:  Bassam Storm is a 40 y o  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 with Shaun Nuha Syndrome, history of remote anoxic brain injury, past status epilepticus and severe intellectual disability who presents with increased somnolence  She typically has multiple seizures per day (mostly brief/mild tonic seizures)She was more awake yesterday and today  Seizures were decreased during the last 24 hours compared to the initial EEG monitoring period  Improvement in seizures may be related to increased wakefulness (her seizures increase in sleep)  During her admission about a month ago there were episodes where she was only responsive to pain that did not necessarily correlate with increased seizure frequency  Differential for her decreased responsiveness in the setting of underlying chronic cerebral dysfunction includes systemic infection, medication effect (including possible excessive serum AED levels) and increased seizure burden  Her history of irregular sleep/wake cycle and inadequate sleep can also contribute  Recommendations:  1  Morning Onfi dosed stopped 11/23  Continue current AEDs  She is on 6 AEDs at good doses  There is little room to escalate therapy which would likely have no/limited benefit and contribute to sedation  2  Follow up pending AED levels  Valproate level 93  Free valproate, topiramate, levetiracetam and rufinamide levels have been ordered  3  Infectious/metabolic workup per primary team  Ammonia near baseline  UA unremarkable   She is at risk for aspiration PNA - CXR on admission unremarkable  No fevers during this admission  Mastoid and middle ear fluid seen on CT - evaluated by ENT today, NTD    4  Seizure precautions  One-to-one supervision  Administer Ativan IV for motor seizure greater than 3 minutes  5  Continuous VEEG stopped today  6  Encourage appropriate sleep/wake cycle  Keep room dark and quiet at night  Engage/stimulate and keep room light during the day  Continue melatonin 6 mg at night  7  OK to discharge back to her facility from a neurological perspective  She is scheduled to see Dr Dez Mauricio in the neurology office on 1/22/2018  Total time spent today 20 minutes  Greater than 50% of total time was spent with the patient and / or family counseling and / or coordination of care   A description of the counseling / coordination of care: discussed case with nursing, PCA, EEG staff and primary team      Tee Saleh MD Date: 11/24/2017 Time: 3:35 PM

## 2017-11-24 NOTE — SOCIAL WORK
CM was informed that pt was admitted from [facility]  CM spoke to Robert Christine in admissions who states pt is a 15 day MA bedhold at [facility]  Per Robert Christine, pt receives total care; pt requires a chelita lift for tranfers--pt is bed confined  Pt requires 1:1 staffing 24/7--[facility] is requesting 24 hour notice prior to dc to have staff in place  Pt uses [facility] transport for when discharged  [facility] referral sent to [facility] SNF  Attempted to contact Bethany Kraft (pts mother) [phone]--SZ answer and unable to leave a message

## 2017-11-24 NOTE — MALNUTRITION/BMI
This medical record reflects one or more clinical indicators suggestive of underweight    Please indicate the one diagnosis below which you feel best reflects the clinical picture  BMI Findings:  <19    Body mass index is 17 01 kg/m²  See Nutrition note dated 11/24/2017   for additional details  Completed nutrition assessment is viewable in the nutrition documentation

## 2017-11-24 NOTE — ASSESSMENT & PLAN NOTE
Was on Onfi 10 mg in the morning and 20 mg at night - AM dose stopped  Keppra 1000 mg every 12 hours  Perampanel 8 mg daily   Banzel 1600 mg twice daily   Topamax 250 mg twice daily   valproic acid 250 mg every 8 hours    S/p VNS  Appreciate neuro  EEG monitoring showed seizure activity

## 2017-11-25 ENCOUNTER — GENERIC CONVERSION - ENCOUNTER (OUTPATIENT)
Dept: OTHER | Facility: OTHER | Age: 37
End: 2017-11-25

## 2017-11-25 PROBLEM — R63.6 UNDERWEIGHT: Status: ACTIVE | Noted: 2017-11-22

## 2017-11-25 LAB
AMMONIA PLAS-SCNC: 84 UMOL/L (ref 11–35)
ANION GAP SERPL CALCULATED.3IONS-SCNC: 6 MMOL/L (ref 4–13)
BUN SERPL-MCNC: 11 MG/DL (ref 5–25)
CALCIUM SERPL-MCNC: 8.4 MG/DL (ref 8.3–10.1)
CHLORIDE SERPL-SCNC: 108 MMOL/L (ref 100–108)
CO2 SERPL-SCNC: 28 MMOL/L (ref 21–32)
CREAT SERPL-MCNC: 0.4 MG/DL (ref 0.6–1.3)
FOLATE SERPL-MCNC: >20 NG/ML (ref 3.1–17.5)
GFR SERPL CREATININE-BSD FRML MDRD: 134 ML/MIN/1.73SQ M
GLUCOSE SERPL-MCNC: 170 MG/DL (ref 65–140)
LEVETIRACETAM SERPL-MCNC: 16.9 UG/ML (ref 10–40)
POTASSIUM SERPL-SCNC: 3.8 MMOL/L (ref 3.5–5.3)
SODIUM SERPL-SCNC: 142 MMOL/L (ref 136–145)
VALPROATE FREE SERPL-MCNC: 12.3 UG/ML (ref 6–22)
VIT B12 SERPL-MCNC: 1745 PG/ML (ref 100–900)

## 2017-11-25 PROCEDURE — 82140 ASSAY OF AMMONIA: CPT | Performed by: GENERAL PRACTICE

## 2017-11-25 PROCEDURE — 82607 VITAMIN B-12: CPT | Performed by: GENERAL PRACTICE

## 2017-11-25 PROCEDURE — 82746 ASSAY OF FOLIC ACID SERUM: CPT | Performed by: GENERAL PRACTICE

## 2017-11-25 PROCEDURE — 80048 BASIC METABOLIC PNL TOTAL CA: CPT | Performed by: GENERAL PRACTICE

## 2017-11-25 RX ADMIN — MELATONIN TAB 3 MG 6 MG: 3 TAB at 21:34

## 2017-11-25 RX ADMIN — PERAMPANEL 8 MG: 4 TABLET ORAL at 09:01

## 2017-11-25 RX ADMIN — POTASSIUM CHLORIDE 40 MEQ: 20 SOLUTION ORAL at 08:50

## 2017-11-25 RX ADMIN — LACTULOSE 30 G: 20 SOLUTION ORAL at 15:38

## 2017-11-25 RX ADMIN — RUFINAMIDE 1600 MG: 200 TABLET, FILM COATED ORAL at 00:06

## 2017-11-25 RX ADMIN — VALPROIC ACID 250 MG: 250 SOLUTION ORAL at 00:06

## 2017-11-25 RX ADMIN — LEVETIRACETAM 1000 MG: 100 SOLUTION ORAL at 21:38

## 2017-11-25 RX ADMIN — LACTULOSE 30 G: 20 SOLUTION ORAL at 21:34

## 2017-11-25 RX ADMIN — LEVOCARNITINE 500 MG: 1 SOLUTION ORAL at 21:37

## 2017-11-25 RX ADMIN — RUFINAMIDE 1600 MG: 200 TABLET, FILM COATED ORAL at 11:50

## 2017-11-25 RX ADMIN — VALPROIC ACID 250 MG: 250 SOLUTION ORAL at 23:19

## 2017-11-25 RX ADMIN — LACTULOSE 30 G: 20 SOLUTION ORAL at 08:48

## 2017-11-25 RX ADMIN — Medication 1 TABLET: at 08:51

## 2017-11-25 RX ADMIN — VALPROIC ACID 250 MG: 250 SOLUTION ORAL at 15:38

## 2017-11-25 RX ADMIN — MINERAL OIL AND WHITE PETROLATUM 1 APPLICATION: 150; 830 OINTMENT OPHTHALMIC at 08:49

## 2017-11-25 RX ADMIN — VALPROIC ACID 250 MG: 250 SOLUTION ORAL at 08:53

## 2017-11-25 RX ADMIN — LEVETIRACETAM 1000 MG: 100 SOLUTION ORAL at 08:55

## 2017-11-25 RX ADMIN — LEVOCARNITINE 500 MG: 1 SOLUTION ORAL at 08:54

## 2017-11-25 RX ADMIN — MINERAL OIL AND WHITE PETROLATUM 1 APPLICATION: 150; 830 OINTMENT OPHTHALMIC at 15:44

## 2017-11-25 RX ADMIN — LEVOCARNITINE 500 MG: 1 SOLUTION ORAL at 15:39

## 2017-11-25 RX ADMIN — TOPIRAMATE 250 MG: 100 TABLET, FILM COATED ORAL at 08:49

## 2017-11-25 RX ADMIN — MINERAL OIL AND WHITE PETROLATUM 1 APPLICATION: 150; 830 OINTMENT OPHTHALMIC at 21:34

## 2017-11-25 RX ADMIN — TOPIRAMATE 250 MG: 100 TABLET, FILM COATED ORAL at 17:59

## 2017-11-25 RX ADMIN — ENOXAPARIN SODIUM 40 MG: 40 INJECTION SUBCUTANEOUS at 17:59

## 2017-11-25 RX ADMIN — CLOBAZAM 20 MG: 10 TABLET ORAL at 21:34

## 2017-11-25 RX ADMIN — RUFINAMIDE 1600 MG: 200 TABLET, FILM COATED ORAL at 23:21

## 2017-11-25 NOTE — PHYSICIAN ADVISOR
Current patient class: Inpatient  The patient is currently on Hospital Day: 4      The patient was admitted to the hospital  on 11/22/17 at 1401 for the following diagnosis:  Seizures ([address] ) [R56 9]       There is documentation in the medical record of an expected length of stay of at least 2 midnights  The patient is therefore expected to satisfy the 2 midnight benchmark and given the 2 midnight presumption is appropriate for INPATIENT ADMISSION  Given this expectation of a satisfying stay, CMS instructs us that the patient is most often appropriate for inpatient admission under part A provided medical necessity is documented in the chart  After review of the relevant documentation, labs, vital signs and test results, the patient is appropriate for INPATIENT ADMISSION  Admission to the hospital as an inpatient is a complex decision making process which requires the practitioner to consider the patients presenting complaint, history and physical examination and all relevant testing  With this in mind, in this case, the patient was deemed appropriate for INPATIENT ADMISSION  After review of the documentation and testing available at the time of the admission I concur with this clinical determination of medical necessity  The patient does have an inpatient admission within the previous 30 days  The patient was admitted on 11/22/17 and discharged on 11/22/17 as an inpatient  The patient therefore required readmission review  In this case the patient should be considered a SEPARATE and UNRELATED INPATIENT ADMISSION  The patient had been discharged in stable condition with a completed care plan  There were no unresolved acute medical issues at the time of discharged which would have reasonably been expected to prompt this readmission  The previous admission was for pneumonia while this admission is for somnolence and seizure  Rationale is as follows:     The patient is a 40 yrs old   Female who presented to the ED at 11/22/2017  2:01 PM with a chief complaint of No chief complaint on file      The patients vitals on arrival were ED Triage Vitals   Temperature Pulse Respirations Blood Pressure SpO2   11/22/17 1450 11/22/17 1450 11/22/17 1450 11/22/17 1918 11/22/17 1450   (!) 97 1 °F (36 2 °C) 84 14 93/64 99 %      Temp Source Heart Rate Source Patient Position - Orthostatic VS BP Location FiO2 (%)   11/22/17 1450 11/22/17 1918 11/22/17 1450 11/22/17 1450 --   Axillary Monitor Lying Right arm       Pain Score       11/22/17 1514       No Pain           Past Medical History:   Diagnosis Date    ADHD     Anoxic brain damage (HCC)     Autistic disorder     Epilepsy ([address] )     Hyperammonemia (HCC)     Hyperkeratosis     Hypotension     Hypotension     Intellectual disability     Intellectual disability     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with tonic seizures (HCC)     Lethargy     Liver enzyme elevation     Onychomycosis     Osteoporosis     Osteoporosis     Psychiatric disorder     anxiety    Seizures (HCC)      Past Surgical History:   Procedure Laterality Date    CARDIAC PACEMAKER PLACEMENT      JEJUNOSTOMY FEEDING TUBE      PEG TUBE PLACEMENT             Consults have been placed to:   IP CONSULT TO NEUROLOGY  IP CONSULT TO NUTRITION SERVICES  IP CONSULT TO ENT    Vitals:    11/24/17 1623 11/25/17 0029 11/25/17 0334 11/25/17 0717   BP: 103/69 94/56 (!) 85/51 (!) 85/58   Pulse: 91 97 96 85   Resp: 18 20 20 18   Temp: 98 °F (36 7 °C) 97 5 °F (36 4 °C)  (!) 97 1 °F (36 2 °C)   TempSrc: Axillary Axillary  Axillary   SpO2: 96% 96%  97%   Weight:       Height:           Most recent labs:    Recent Labs      11/22/17   0915   11/23/17   0611  11/24/17   0539  11/25/17   0607   WBC  7 62   --   6 99  7 98   --    HGB  13 2   --   12 9  12 3   --    HCT  40 2   --   40 0  38 0   --    PLT  219   < >  233  215   --    K  3 9   --   3 4*  3 4*  3 8   NA  139   --   141  142  142   CALCIUM  9 2   --   8 9  8 8  8 4   BUN 9   --   4*  9  11   CREATININE  0 47*   --   0 39*  0 42*  0 40*   INR  1 03   --    --    --    --    AST  16   --   15   --    --    ALT  31   --   26   --    --    ALKPHOS  87   --   82   --    --    BILITOT  0 10*   --   0 24   --    --     < > = values in this interval not displayed         Scheduled Meds:  artificial tear 1 application Ophthalmic TID   cloBAZam 20 mg Per NG Tube HS   enoxaparin 40 mg Subcutaneous Q24H   lactulose 30 g Oral TID   levETIRAcetam 1,000 mg Per G Tube Q12H [facility]   levOCARNitine 500 mg Per G Tube TID   melatonin 6 mg Oral HS   Perampanel 8 mg Oral Daily   potassium chloride 40 mEq Per PEG Tube Daily   rufinamide 1,600 mg Per G Tube BID   senna-docusate sodium 1 tablet Per G Tube Daily   topiramate 250 mg Per G Tube BID   Valproic Acid 250 mg Per G Tube Q8H     Continuous Infusions:   PRN Meds:   acetaminophen    magnesium hydroxide    ondansetron

## 2017-11-25 NOTE — PROGRESS NOTES
Resting in bed  Sleeping jevity tube feeding infusing at 60ml with 115 h20 bolus scheduled q 6 hrs  no seizure activity noted  Remains on one to one watch  Peg tube and binder in place   Will observe

## 2017-11-25 NOTE — ASSESSMENT & PLAN NOTE
Was on Onfi 10 mg in the morning and 20 mg at night - AM dose stopped  Keppra 1000 mg every 12 hours  Perampanel 8 mg daily   Banzel 1600 mg twice daily   Topamax 250 mg twice daily   valproic acid 250 mg every 8 hours    S/p VNS  Appreciate neuro - pt stable for d/c  EEG monitoring showed seizure activity

## 2017-11-25 NOTE — ASSESSMENT & PLAN NOTE
Possibly 2/2 Onfi - morning dose stopped 11/23  NH3 near baseline - c/w current lactulose dose  PT awake in AM

## 2017-11-25 NOTE — PLAN OF CARE
DISCHARGE PLANNING     Discharge to home or other facility with appropriate resources Progressing        DISCHARGE PLANNING - CARE MANAGEMENT     Discharge to post-acute care or home with appropriate resources Progressing        Knowledge Deficit     Patient/family/caregiver demonstrates understanding of disease process, treatment plan, medications, and discharge instructions Progressing        NEUROSENSORY - ADULT     Achieves stable or improved neurological status Progressing     Absence of seizures Progressing     Remains free of injury related to seizures activity Progressing     Achieves maximal functionality and self care Progressing        Nutrition/Hydration-ADULT     Nutrient/Hydration intake appropriate for improving, restoring or maintaining nutritional needs Progressing        Potential for Falls     Patient will remain free of falls Progressing        Prexisting or High Potential for Compromised Skin Integrity     Skin integrity is maintained or improved Progressing        SAFETY ADULT     Maintain or return to baseline ADL function Progressing     Maintain or return mobility status to optimal level Progressing     Patient will remain free of falls Progressing

## 2017-11-25 NOTE — PROGRESS NOTES
Progress Note - Trevin Ayala 40 y o  female MRN: [id number]    Unit/Bed#: [facility] 719-01 Encounter: [id number]        Seizure disorder [facility])   Assessment & Plan    Was on Onfi 10 mg in the morning and 20 mg at night - AM dose stopped  Keppra 1000 mg every 12 hours  Perampanel 8 mg daily   Banzel 1600 mg twice daily   Topamax 250 mg twice daily   valproic acid 250 mg every 8 hours  S/p VNS  Appreciate neuro - pt stable for d/c  EEG monitoring showed seizure activity        * Somnolence   Assessment & Plan    Possibly 2/2 Onfi - morning dose stopped 11/23  NH3 near baseline - c/w current lactulose dose  PT awake in AM        Acute DANIEL (middle ear effusion)   Assessment & Plan    ENT consult appreciated  Likely related to viral URI  Pt had tube PTA        Hyperammonemia (HCC)   Assessment & Plan    2/2 depakote  Ammonia near baseline          Underweight   Assessment & Plan    C/w Tube feeds  Restarted oral pleasure feeds  Pt on daily potassium  Will need BMP 1 week after d/c  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[address] )   Assessment & Plan    Seizure management as above        Dysphagia   Assessment & Plan    On Tube feeds  Restarted pleasure feeds level 3 thin when awake        Macrocytic anemia   Assessment & Plan    Hgb above baseline 11-12  Check B12 and folate tomorrow        Hypokalemia   Assessment & Plan    Started on daily supplement through PEG  Likely 2/2 malnutrition and lactulose        Intellectual disability   Assessment & Plan    Pt nonverbal at baseline            VTE Pharmacologic Prophylaxis:   Pharmacologic: Enoxaparin (Lovenox)  Mechanical: Mechanical VTE prophylaxis in place  Patient Centered Rounds: I have performed bedside rounds with nursing staff today  Discussions with Specialists or Other Care Team Provider: not today  Education and Discussions with Family / Patient: attempted to call parents but no answer  Time Spent for Care: 30 minutes    More than 50% of total time spent on counseling and coordination of care as described above  Current Length of Stay: 3 day(s)  Current Patient Status: Inpatient   Certification Statement: The patient will continue to require additional inpatient hospital stay due to need to set up return to SNF    Discharge Plan: d/c Monday to   Code Status: Level 1 - Full Code    Subjective:   Pt seen and examined  Was awake earlier but now asleep  Objective:   Vitals:   Temp (24hrs), Av 5 °F (36 4 °C), Min:97 1 °F (36 2 °C), Max:98 °F (36 7 °C)    HR:  [85-97] 85  Resp:  [18-20] 18  BP: ()/(51-69) 85/58  SpO2:  [96 %-97 %] 97 %  Body mass index is 17 01 kg/m²  Input and Output Summary (last 24 hours): Intake/Output Summary (Last 24 hours) at 17 1349  Last data filed at 17 1306   Gross per 24 hour   Intake              600 ml   Output              422 ml   Net              178 ml       Physical Exam:     Physical Exam   Constitutional: No distress  HENT:   Head: Normocephalic and atraumatic  Eyes: Conjunctivae and EOM are normal    Neck: Normal range of motion  Neck supple  Cardiovascular: Normal rate and regular rhythm  Pulmonary/Chest: Effort normal and breath sounds normal    Abdominal: Soft  Bowel sounds are normal  She exhibits no distension  There is no tenderness  Musculoskeletal: She exhibits no edema  Neurological:   Pt asleep   Skin: Skin is warm and dry  She is not diaphoretic  Additional Data:   Labs:    Results from last 7 days  Lab Units 17  0539  17  0915   WBC Thousand/uL 7 98  < > 7 62   HEMOGLOBIN g/dL 12 3  < > 13 2   HEMATOCRIT % 38 0  < > 40 2   PLATELETS Thousands/uL 215  < > 219   NEUTROS PCT %  --   --  50   LYMPHS PCT %  --   --  36   MONOS PCT %  --   --  12   EOS PCT %  --   --  2   < > = values in this interval not displayed      Results from last 7 days  Lab Units 17  0607  17  0611   SODIUM mmol/L 142  < > 141   POTASSIUM mmol/L 3 8  < > 3 4*   CHLORIDE mmol/L 108  < > 108   CO2 mmol/L 28  < > 26   BUN mg/dL 11  < > 4*   CREATININE mg/dL 0 40*  < > 0 39*   CALCIUM mg/dL 8 4  < > 8 9   TOTAL PROTEIN g/dL  --   --  6 7   BILIRUBIN TOTAL mg/dL  --   --  0 24   ALK PHOS U/L  --   --  82   ALT U/L  --   --  26   AST U/L  --   --  15   GLUCOSE RANDOM mg/dL 170*  < > 141*   < > = values in this interval not displayed  Results from last 7 days  Lab Units 11/22/17  0915   INR  1 03       * I Have Reviewed All Lab Data Listed Above  * Additional Pertinent Lab Tests Reviewed: [address] Admission Reviewed        Cultures:   Blood Culture:   Lab Results   Component Value Date    BLOODCX No Growth at 48 hrs  11/22/2017    BLOODCX No Growth at 48 hrs  11/22/2017    BLOODCX No Growth After 5 Days  10/10/2017    BLOODCX No Growth After 5 Days  10/10/2017    BLOODCX No Growth After 5 Days  07/07/2017    BLOODCX No Growth After 5 Days  07/07/2017    BLOODCX Bacillus species NOT anthracis 07/05/2017     Urine Culture: No results found for: URINECX  Sputum Culture: No components found for: SPUTUMCX  Wound Culture: No results found for: WOUNDCULT    Last 24 Hours Medication List:     artificial tear 1 application Ophthalmic TID   cloBAZam 20 mg Per NG Tube HS   enoxaparin 40 mg Subcutaneous Q24H   lactulose 30 g Oral TID   levETIRAcetam 1,000 mg Per G Tube Q12H [facility]   levOCARNitine 500 mg Per G Tube TID   melatonin 6 mg Oral HS   Perampanel 8 mg Oral Daily   potassium chloride 40 mEq Per PEG Tube Daily   rufinamide 1,600 mg Per G Tube BID   senna-docusate sodium 1 tablet Per G Tube Daily   topiramate 250 mg Per G Tube BID   Valproic Acid 250 mg Per G Tube Q8H        Today, Patient Was Seen By: Lyndon Sullivan DO    ** Please Note: Dragon 360 Dictation voice to text software may have been used in the creation of this document   **

## 2017-11-25 NOTE — PROGRESS NOTES
Brief Epilepsy/Neurology Note  Talked with nurse and 1:1 staff  Linette Napoles was awake much of the morning today  She was restless at times and 1:1 staff moved their chair close to the bed due to need to frequently redirect  Not sure if she slept well last night or was awake, nursing sign out did not indicate  No report of increased seizure frequency/severity  She was resting peacefully in bed when I stopped by her room at 12:15 pm today  No change to current plan  Anticipate discharge to her facility on Monday  Please call the epileptologist on-call with questions       Tee Saleh MD  11/25/2017 12:24 PM

## 2017-11-25 NOTE — ASSESSMENT & PLAN NOTE
C/w Tube feeds  Restarted oral pleasure feeds  Pt on daily potassium  Will need BMP 1 week after d/c

## 2017-11-26 LAB — RUFINAMIDE SERPL-MCNC: 22.2 UG/ML

## 2017-11-26 RX ADMIN — LEVOCARNITINE 500 MG: 1 SOLUTION ORAL at 21:03

## 2017-11-26 RX ADMIN — VALPROIC ACID 250 MG: 250 SOLUTION ORAL at 08:11

## 2017-11-26 RX ADMIN — TOPIRAMATE 250 MG: 100 TABLET, FILM COATED ORAL at 08:13

## 2017-11-26 RX ADMIN — LACTULOSE 30 G: 20 SOLUTION ORAL at 21:01

## 2017-11-26 RX ADMIN — TOPIRAMATE 250 MG: 100 TABLET, FILM COATED ORAL at 18:19

## 2017-11-26 RX ADMIN — LACTULOSE 30 G: 20 SOLUTION ORAL at 08:12

## 2017-11-26 RX ADMIN — POTASSIUM CHLORIDE 40 MEQ: 20 SOLUTION ORAL at 08:13

## 2017-11-26 RX ADMIN — MINERAL OIL AND WHITE PETROLATUM 1 APPLICATION: 150; 830 OINTMENT OPHTHALMIC at 08:17

## 2017-11-26 RX ADMIN — LEVOCARNITINE 500 MG: 1 SOLUTION ORAL at 16:11

## 2017-11-26 RX ADMIN — LACTULOSE 30 G: 20 SOLUTION ORAL at 16:12

## 2017-11-26 RX ADMIN — MINERAL OIL AND WHITE PETROLATUM 1 APPLICATION: 150; 830 OINTMENT OPHTHALMIC at 21:02

## 2017-11-26 RX ADMIN — PERAMPANEL 8 MG: 4 TABLET ORAL at 08:22

## 2017-11-26 RX ADMIN — MELATONIN TAB 3 MG 6 MG: 3 TAB at 21:02

## 2017-11-26 RX ADMIN — LEVOCARNITINE 500 MG: 1 SOLUTION ORAL at 08:16

## 2017-11-26 RX ADMIN — VALPROIC ACID 250 MG: 250 SOLUTION ORAL at 23:39

## 2017-11-26 RX ADMIN — ENOXAPARIN SODIUM 40 MG: 40 INJECTION SUBCUTANEOUS at 18:19

## 2017-11-26 RX ADMIN — LEVETIRACETAM 1000 MG: 100 SOLUTION ORAL at 21:03

## 2017-11-26 RX ADMIN — MINERAL OIL AND WHITE PETROLATUM 1 APPLICATION: 150; 830 OINTMENT OPHTHALMIC at 16:11

## 2017-11-26 RX ADMIN — VALPROIC ACID 250 MG: 250 SOLUTION ORAL at 07:42

## 2017-11-26 RX ADMIN — VALPROIC ACID 250 MG: 250 SOLUTION ORAL at 16:11

## 2017-11-26 RX ADMIN — RUFINAMIDE 1600 MG: 200 TABLET, FILM COATED ORAL at 13:33

## 2017-11-26 RX ADMIN — RUFINAMIDE 1600 MG: 200 TABLET, FILM COATED ORAL at 23:37

## 2017-11-26 RX ADMIN — LEVETIRACETAM 1000 MG: 100 SOLUTION ORAL at 08:15

## 2017-11-26 RX ADMIN — CLOBAZAM 20 MG: 10 TABLET ORAL at 21:02

## 2017-11-26 NOTE — PROGRESS NOTES
Progress Note - Ricky Age 40 y o  female MRN: [id number]    Unit/Bed#: [facility] 719-01 Encounter: [id number]        Seizure disorder [facility])   Assessment & Plan    Was on Onfi 10 mg in the morning and 20 mg at night - AM dose stopped  Keppra 1000 mg every 12 hours  Perampanel 8 mg daily   Banzel 1600 mg twice daily   Topamax 250 mg twice daily   valproic acid 250 mg every 8 hours  S/p VNS  Appreciate neuro - pt stable for d/c  EEG monitoring showed seizure activity        * Somnolence   Assessment & Plan    Possibly 2/2 Onfi - morning dose stopped 11/23  NH3 near baseline - c/w current lactulose dose  PT awake today on my exam        Acute DANIEL (middle ear effusion)   Assessment & Plan    ENT consult appreciated  Likely related to viral URI  Pt had tube PTA        Hyperammonemia (HCC)   Assessment & Plan    2/2 depakote  Ammonia near baseline          Underweight   Assessment & Plan    C/w Tube feeds  Restarted oral pleasure feeds  Pt on daily potassium  Will need BMP 1 week after d/c  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[facility] [address] )   Assessment & Plan    Seizure management as above        Dysphagia   Assessment & Plan    On Tube feeds  Restarted pleasure feeds level 3 thin when awake        Macrocytic anemia   Assessment & Plan    Hgb above baseline 11-12  B12 and folate high - no need for supplement        Hypokalemia   Assessment & Plan    Started on daily supplement through PEG  Likely 2/2 malnutrition and lactulose        Intellectual disability   Assessment & Plan    Pt nonverbal at baseline              VTE Pharmacologic Prophylaxis:   Pharmacologic: Enoxaparin (Lovenox)  Mechanical VTE Prophylaxis in Place: Yes    Patient Centered Rounds: I have performed bedside rounds with nursing staff today  Discussions with Specialists or Other Care Team Provider: n/a    Education and Discussions with Family / Patient: attempted to call father but no answer    Time Spent for Care: 30 minutes    More than 50% of total time spent on counseling and coordination of care as described above  Current Length of Stay: 4 day(s)    Current Patient Status: Inpatient   Certification Statement: The patient will continue to require additional inpatient hospital stay due to need to safely return to LTC    Discharge Plan: return to LTC tomorrow    Code Status: Level 1 - Full Code      Subjective:   Pt seen and examined  No o/n issues  Objective:     Vitals:   Temp (24hrs), Av 1 °F (36 7 °C), Min:97 9 °F (36 6 °C), Max:98 4 °F (36 9 °C)    HR:  [72-87] 81  Resp:  [16-18] 18  BP: ()/(49-76) 80/49  SpO2:  [93 %-97 %] 93 %  Body mass index is 17 01 kg/m²  Input and Output Summary (last 24 hours): Intake/Output Summary (Last 24 hours) at 17 1143  Last data filed at 17 1001   Gross per 24 hour   Intake             2105 ml   Output                0 ml   Net             2105 ml       Physical Exam:     Physical Exam   Constitutional: She appears well-developed and well-nourished  HENT:   Head: Normocephalic and atraumatic  Eyes: Conjunctivae and EOM are normal    Neck: Normal range of motion  Neck supple  Cardiovascular: Normal rate and regular rhythm  Pulmonary/Chest: Effort normal and breath sounds normal    Abdominal: Soft  Bowel sounds are normal  She exhibits no distension  There is no tenderness  Musculoskeletal: Normal range of motion  She exhibits no edema  Neurological: She is alert  nonverbal   Skin: Skin is dry  Additional Data:     Labs:      Results from last 7 days  Lab Units 17  0539  17  0915   WBC Thousand/uL 7 98  < > 7 62   HEMOGLOBIN g/dL 12 3  < > 13 2   HEMATOCRIT % 38 0  < > 40 2   PLATELETS Thousands/uL 215  < > 219   NEUTROS PCT %  --   --  50   LYMPHS PCT %  --   --  36   MONOS PCT %  --   --  12   EOS PCT %  --   --  2   < > = values in this interval not displayed      Results from last 7 days  Lab Units 17  0607  17  0611   SODIUM mmol/L 142  < > 141   POTASSIUM mmol/L 3 8  < > 3 4*   CHLORIDE mmol/L 108  < > 108   CO2 mmol/L 28  < > 26   BUN mg/dL 11  < > 4*   CREATININE mg/dL 0 40*  < > 0 39*   CALCIUM mg/dL 8 4  < > 8 9   TOTAL PROTEIN g/dL  --   --  6 7   BILIRUBIN TOTAL mg/dL  --   --  0 24   ALK PHOS U/L  --   --  82   ALT U/L  --   --  26   AST U/L  --   --  15   GLUCOSE RANDOM mg/dL 170*  < > 141*   < > = values in this interval not displayed  Results from last 7 days  Lab Units 11/22/17 0915   INR  1 03       * I Have Reviewed All Lab Data Listed Above  * Additional Pertinent Lab Tests Reviewed: Maddie [address] Admission Reviewed        Recent Cultures (last 7 days):       Results from last 7 days  Lab Units 11/22/17  0932 11/22/17 0915   BLOOD CULTURE  No Growth at 72 hrs  No Growth at 72 hrs  Last 24 Hours Medication List:     artificial tear 1 application Ophthalmic TID   cloBAZam 20 mg Per NG Tube HS   enoxaparin 40 mg Subcutaneous Q24H   lactulose 30 g Oral TID   levETIRAcetam 1,000 mg Per G Tube Q12H [address]   levOCARNitine 500 mg Per G Tube TID   melatonin 6 mg Oral HS   Perampanel 8 mg Oral Daily   potassium chloride 40 mEq Per PEG Tube Daily   rufinamide 1,600 mg Per G Tube BID   senna-docusate sodium 1 tablet Per G Tube Daily   topiramate 250 mg Per G Tube BID   Valproic Acid 250 mg Per G Tube Q8H        Today, Patient Was Seen By: Winsome Dhaliwal DO    ** Please Note: Dictation voice to text software may have been used in the creation of this document   **

## 2017-11-26 NOTE — PROCEDURES
Continuous Video EEG Monitoring       Patient Name:  Teresita Garcia  MRN: [id number]   :  1980 File #: [id number]   Age: 40 y o  Encounter #: [id number]   Start Time: 17 End Time: 17        Report date: 2017          Study type: Continuous video EEG    ICD 10 diagnosis: Generalized epilepsy intractable with status G40 311 Lennox Gastaut syndrome      -------------------------------------------------------------------------------------------------------------------   Patient History: This recording was observed in a 40 y o  female with Nabil Valinda Syndrome and frequent tonic seizures to evaluate for interval change in seizure burden that may contribute to excessive sedation  Medications include:     artificial tear 1 application Ophthalmic TID   cloBAZam 20 mg Per NG Tube HS   enoxaparin 40 mg Subcutaneous Q24H   lactulose 30 g Oral TID   levETIRAcetam 1,000 mg Per G Tube Q12H [facility]   levOCARNitine 500 mg Per G Tube TID   melatonin 6 mg Oral HS   Perampanel 8 mg Oral Daily   potassium chloride 40 mEq Per PEG Tube Daily   rufinamide 1,600 mg Per G Tube BID   senna-docusate sodium 1 tablet Per G Tube Daily   topiramate 250 mg Per G Tube BID   Valproic Acid 250 mg Per G Tube Q8H       -------------------------------------------------------------------------------------------------------------------   Description of Procedure:  32 channel digital recording with electrodes placed according to the International 10-20 system with additional T1/T2 electrodes, EOG, EKG, and simultaneous video  A monitoring technologist supervised the continuous recording  The recording was technically satisfactory  -------------------------------------------------------------------------------------------------------------------   Results:   Manual Review:   During wakefulness there were was no definite posteriorly dominant rhythm that attenuated with eye opening   Instead, there were diffuse low amplitude theta activities often near 5 cps, periods of relative suppression with very low amplitude mixed activities  During sleep better regulated 5 cps theta activities attenuated and there were very low to low amplitude poorly regulated mixed frequency theta/alpha activities with intermittent delta and frequent spike/polyspike wave discharges       There were frequent bursts of arrhythmic generalized spike wave discharges that at times showed shifting lateralization  There were independent left and right temporal spikes  There were bursts of 2-2 5 cps rhythmic generalized discharges that at times correlated with mild eyelid myoclonia       The patient was asleep during the majority of this study  At times during sleep there were intermittent poorly regulated low to medium amplitude delta activities near 1-2 cps with overriding very low amplitude mixed frequency theta/alpha and beta  During other epochs there were diffuse low amplitude beta activity with intermittent mild bursts of mixed frequency theta/delta  Other findings:  Samples of the single channel ECG demonstrated a regular rhythm  Events:   Seizures involved diffuse medium to high amplitude paroxysmal fast activity and/or rhythmic diffuse spike wave discharges typically lasted between 3 and 15 seconds, with longer events demonstrating evolution in frequency  Events lasting longer than 5 seconds or more often had often had mild clinical correlate of eye opening and mild posturing of the R more than L UE  The seizures were relatively accurately detected by automated seizure detection Himanshu Apodaca)  Some events lasted up to 30 seconds  Most seizures occurred during sleep  Detection occurring within one minute of another detection are considered one event        Hour Seizure Count   8 15   9 12   10 11   11 12   12 12   Grand Total 62 -------------------------------------------------------------------------------------------------------------------   Interpretation: This prolonged, continuous video-EEG recording is abnormal       Background disorganization, theta frequency slowing and frequent generalized as well as independent left and right temporal epileptiform discharges are consistent with the diagnosis of Lennox Gastaut syndrome       During this 5 hour study there were approximately 62 electrographic seizures consisting of diffuse paroxysmal fast activity and/or rhythmic bursts of spike wave discharges lasting about 3 to 30 seconds in duration        Hayde Villarreal MD   [address] [facility]

## 2017-11-26 NOTE — PROGRESS NOTES
Vagus Nerve Stimulator Interrogation    Vagal Nerve Stimulator (VNS) interrogation was performed  Model: 105  Lead Impedence: OK      Output 2 00mA, Signal frequency 30Hz, Pulse Width 500 microsec, on time 21 sec, off time 0 8 minute  Mag setting Output 2 25mA, Signal on 60 sec, pulse width 500 microsec     System diagnostics were performed and no problems were identified       Luz Maria Hutchinson MD  11/26/2017 2:23 PM

## 2017-11-26 NOTE — ASSESSMENT & PLAN NOTE
Possibly 2/2 Onfi - morning dose stopped 11/23  NH3 near baseline - c/w current lactulose dose  PT awake today on my exam

## 2017-11-27 VITALS
WEIGHT: 87.08 LBS | SYSTOLIC BLOOD PRESSURE: 100 MMHG | RESPIRATION RATE: 16 BRPM | HEIGHT: 60 IN | DIASTOLIC BLOOD PRESSURE: 72 MMHG | OXYGEN SATURATION: 98 % | BODY MASS INDEX: 17.1 KG/M2 | HEART RATE: 78 BPM | TEMPERATURE: 97.2 F

## 2017-11-27 PROBLEM — R40.0 SOMNOLENCE: Status: RESOLVED | Noted: 2017-11-22 | Resolved: 2017-11-27

## 2017-11-27 LAB
ANION GAP SERPL CALCULATED.3IONS-SCNC: 8 MMOL/L (ref 4–13)
BACTERIA BLD CULT: NORMAL
BACTERIA BLD CULT: NORMAL
BUN SERPL-MCNC: 14 MG/DL (ref 5–25)
CALCIUM SERPL-MCNC: 8.9 MG/DL (ref 8.3–10.1)
CHLORIDE SERPL-SCNC: 107 MMOL/L (ref 100–108)
CO2 SERPL-SCNC: 26 MMOL/L (ref 21–32)
CREAT SERPL-MCNC: 0.39 MG/DL (ref 0.6–1.3)
GFR SERPL CREATININE-BSD FRML MDRD: 136 ML/MIN/1.73SQ M
GLUCOSE SERPL-MCNC: 143 MG/DL (ref 65–140)
POTASSIUM SERPL-SCNC: 3.6 MMOL/L (ref 3.5–5.3)
SODIUM SERPL-SCNC: 141 MMOL/L (ref 136–145)
TOPIRAMATE SERPL-MCNC: 7.1 UG/ML (ref 2–25)

## 2017-11-27 PROCEDURE — 80048 BASIC METABOLIC PNL TOTAL CA: CPT | Performed by: GENERAL PRACTICE

## 2017-11-27 RX ORDER — POTASSIUM CHLORIDE 20MEQ/15ML
40 LIQUID (ML) ORAL DAILY
Qty: 900 ML | Refills: 0 | Status: SHIPPED | OUTPATIENT
Start: 2017-11-28 | End: 2017-11-30

## 2017-11-27 RX ORDER — LACTULOSE 20 G/30ML
30 SOLUTION ORAL 3 TIMES DAILY
Refills: 0
Start: 2017-11-27 | End: 2017-12-01

## 2017-11-27 RX ORDER — LANOLIN ALCOHOL/MO/W.PET/CERES
6 CREAM (GRAM) TOPICAL
Qty: 60 TABLET | Refills: 0 | Status: SHIPPED | OUTPATIENT
Start: 2017-11-27 | End: 2017-12-27

## 2017-11-27 RX ORDER — CLOBAZAM 20 MG/1
20 TABLET ORAL
Qty: 10 TABLET | Refills: 0 | Status: SHIPPED | OUTPATIENT
Start: 2017-11-27 | End: 2017-11-30

## 2017-11-27 RX ADMIN — LACTULOSE 30 G: 20 SOLUTION ORAL at 09:12

## 2017-11-27 RX ADMIN — LEVOCARNITINE 500 MG: 1 SOLUTION ORAL at 09:15

## 2017-11-27 RX ADMIN — VALPROIC ACID 250 MG: 250 SOLUTION ORAL at 06:33

## 2017-11-27 RX ADMIN — Medication 1 TABLET: at 09:14

## 2017-11-27 RX ADMIN — RUFINAMIDE 1600 MG: 200 TABLET, FILM COATED ORAL at 11:18

## 2017-11-27 RX ADMIN — PERAMPANEL 8 MG: 4 TABLET ORAL at 09:18

## 2017-11-27 RX ADMIN — LEVETIRACETAM 1000 MG: 100 SOLUTION ORAL at 09:15

## 2017-11-27 RX ADMIN — LEVOCARNITINE 500 MG: 1 SOLUTION ORAL at 16:02

## 2017-11-27 RX ADMIN — TOPIRAMATE 250 MG: 100 TABLET, FILM COATED ORAL at 09:14

## 2017-11-27 RX ADMIN — MINERAL OIL AND WHITE PETROLATUM 1 APPLICATION: 150; 830 OINTMENT OPHTHALMIC at 16:02

## 2017-11-27 RX ADMIN — POTASSIUM CHLORIDE 40 MEQ: 20 SOLUTION ORAL at 09:14

## 2017-11-27 RX ADMIN — LACTULOSE 30 G: 20 SOLUTION ORAL at 16:00

## 2017-11-27 RX ADMIN — VALPROIC ACID 250 MG: 250 SOLUTION ORAL at 15:59

## 2017-11-27 RX ADMIN — MINERAL OIL AND WHITE PETROLATUM 1 APPLICATION: 150; 830 OINTMENT OPHTHALMIC at 09:12

## 2017-11-27 NOTE — CASE MANAGEMENT
Continued Stay Review    Date:       Vital Signs:   Temp (24hrs), Av 1 °F (36 7 °C), Min:97 9 °F (36 6 °C), Max:98 4 °F (36 9 °C)     HR:  [72-87] 81  Resp:  [16-18] 18  BP: ()/(49-76) 80/49  SpO2:  [93 %-97 %] 93 %  Body mass index is 17 01 kg/m²  Medications:     artificial tear 1 application Ophthalmic TID   cloBAZam 20 mg Per NG Tube HS   enoxaparin 40 mg Subcutaneous Q24H   lactulose 30 g Oral TID   levETIRAcetam 1,000 mg Per G Tube Q12H [facility]   levOCARNitine 500 mg Per G Tube TID   melatonin 6 mg Oral HS   Perampanel 8 mg Oral Daily   potassium chloride 40 mEq Per PEG Tube Daily   rufinamide 1,600 mg Per G Tube BID   senna-docusate sodium 1 tablet Per G Tube Daily   topiramate 250 mg Per G Tube BID   Valproic Acid 250 mg Per G Tube Q8H        Age/Sex: 40 y o  female     Assessment/Plan:     Seizure disorder ([facility] [address] )   Assessment & Plan     Was on Onfi 10 mg in the morning and 20 mg at night - AM dose stopped  Keppra 1000 mg every 12 hours  Perampanel 8 mg daily   Banzel 1600 mg twice daily   Topamax 250 mg twice daily   valproic acid 250 mg every 8 hours    S/p VNS  Appreciate neuro - pt stable for d/c  EEG monitoring showed seizure activity       * Somnolence   Assessment & Plan     Possibly 2/2 Onfi - morning dose stopped   NH3 near baseline - c/w current lactulose dose  PT awake today on my exam       Acute DANIEL (middle ear effusion)   Assessment & Plan     ENT consult appreciated  Likely related to viral URI  Pt had tube PTA       Hyperammonemia (HCC)   Assessment & Plan     2/2 depakote  Ammonia near baseline          Underweight   Assessment & Plan     C/w Tube feeds  Restarted oral pleasure feeds  Pt on daily potassium  Will need BMP 1 week after d/c           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[facility] [address] )   Assessment & Plan     Seizure management as above       Dysphagia   Assessment & Plan     On Tube feeds  Restarted pleasure feeds level 3 thin when awake       Macrocytic anemia Assessment & Plan     Hgb above baseline 11-12  B12 and folate high - no need for supplement       Hypokalemia   Assessment & Plan     Started on daily supplement through PEG    Likely 2/2 malnutrition and lactulose       Intellectual disability   Assessment & Plan     Pt nonverbal at baseline       Current Length of Stay: 4 day(s)     Current Patient Status: Inpatient   Certification Statement: The patient will continue to require additional inpatient hospital stay due to need to safely return to LTC        Discharge Plan: [facility]  [address] [place]

## 2017-11-27 NOTE — DISCHARGE SUMMARY
Discharge Summary - [address] [facility]    Patient Information: Cristal Edmonds 40 y o  female MRN: [id number]  Unit/Bed#: [facility] 255-05 Encounter: [id number]    Discharging Physician / Practitioner: Mu Baxter DO  PCP: Aliyah Bella DO  Admission Date: 11/22/2017  Discharge Date: 11/27/17    Reason for Admission: somnolence    Discharge Diagnoses:     Principal Problem (Resolved): Somnolence  Active Problems:    Seizure disorder (HCC)    Dysphagia    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HCC)    Underweight    Hyperammonemia (HCC)    Acute DANIEL (middle ear effusion)    Macrocytic anemia    Intellectual disability    Hypokalemia      Consultations During Hospital Stay:  · Dr Baldemar Webb - neuro  · Dr Jose Angel Cunningham - ENT    Procedures Performed:     · VEEG monitoring showed seizure activity    Significant Findings / Test Results:     · See above    Incidental Findings:   · none     Test Results Pending at Discharge (will require follow up):   · none     Outpatient Tests Requested:  · BMP in 1 week    Complications:  none    Hospital Course:     Cristal Edmonds is a 40 y o  female patient who originally presented to the hospital on 11/22/2017 due to somnolence  This may have been related to her multiple seizure meds  Her AM dose of Onfi was stopped, and she was monitored with vEEG  VEEG showed seizure activity, but meds were not changed as she is already on 6 meds and has a vagus nerve stimulator  Her Ammonia was noted top be mildly elevated, so her lactulose was increased  Her ammonia was at baseline at d/c  Pt was noted to have an acute DANIEL   ENT was consulted and said this was viral and likely did not contribute to her somnolence  Condition at Discharge: stable     Discharge Day Visit / Exam:     Subjective:  PT seen and examined  Was awake earlier  No o/n events    Vitals: Blood Pressure: 100/72 (11/27/17 0751)  Pulse: 78 (11/27/17 0751)  Temperature: (!) 97 2 °F (36 2 °C) (11/27/17 0751)  Temp Source: Axillary (11/27/17 0751)  Respirations: 16 (11/27/17 0751)  Height: 5' (152 4 cm) (11/22/17 1450)  Weight - Scale: 39 5 kg (87 lb 1 3 oz) (11/22/17 1450)  SpO2: 98 % (11/27/17 0751)  Exam:   Physical Exam   Constitutional: No distress  HENT:   Head: Normocephalic and atraumatic  Eyes: Conjunctivae and EOM are normal    Neck: Normal range of motion  Neck supple  Cardiovascular: Normal rate and regular rhythm  Pulmonary/Chest: Effort normal and breath sounds normal    Abdominal: Soft  Bowel sounds are normal  She exhibits no distension  There is no tenderness  Musculoskeletal: Normal range of motion  She exhibits no edema  Neurological: She is alert  Skin: Skin is warm and dry  She is not diaphoretic  Discussion with Family: no    Discharge instructions/Information to patient and family:   See after visit summary for information provided to patient and family  Provisions for Follow-Up Care:  See after visit summary for information related to follow-up care and any pertinent home health orders  Disposition:     Long Term Acute Care (LTAC) Facility at [address] [place] [address] SNF:   · Not Applicable to this Patient - Not Applicable to this Patient    Planned Readmission: no     Discharge Statement:  I spent 35 minutes discharging the patient  This time was spent on the day of discharge  I had direct contact with the patient on the day of discharge  Greater than 50% of the total time was spent examining patient, answering all patient questions, arranging and discussing plan of care with patient as well as directly providing post-discharge instructions  Additional time then spent on discharge activities  Discharge Medications:  See after visit summary for reconciled discharge medications provided to patient and family        ** Please Note: This note has been constructed using a voice recognition system **

## 2017-11-27 NOTE — DISCHARGE INSTRUCTIONS
Check BMP in 1 week  Titrate lactulose to obtain 3 bowel movements per 24h  Epilepsy   AMBULATORY CARE:   Epilepsy  is a brain disorder that causes seizures  It is also called a seizure disorder  A seizure means an abnormal area in your brain sometimes sends bursts of electrical activity  A seizure may start in one part of your brain, or both sides may be affected  Depending on the type of seizure, you may have movements you cannot control, lose consciousness, or stare straight ahead  You may be confused or tired after the seizure  A seizure may last a few seconds or longer than 5 minutes  Epilepsy is usually diagnosed if you have at least 2 seizures within 24 hours  A birth defect, tumor, stroke, dementia, injury, or infection may cause epilepsy  The cause of your epilepsy may not be known  If your seizures are not controlled, epilepsy may become life-threatening  Call 911 for any of the following:   · Your seizure lasts longer than 5 minutes  · You have trouble breathing after a seizure  · You have diabetes or are pregnant and have a seizure  · You have a seizure in water, such as a swimming pool or bathtub  Seek care immediately if:   · You have a second seizure within 24 hours of the first      · You are injured during a seizure  Contact your healthcare provider if:   · You feel you are not able to cope with your condition  · Your seizures start to happen more often  · You are confused longer than usual after a seizure  · You are planning to get pregnant or are currently pregnant  · You have questions or concerns about your condition or care  Treatment for epilepsy:  The goal of treatment is to try to stop your seizures completely  You may need any of the following:  · Antiseizure  medicine may be given to shorten or prevent seizures  Do not stop taking this medicine  unless your healthcare provider tells you to       · Surgery  may help reduce how often you have seizures if medicine does not help  Ask your healthcare provider for more information about surgery for epilepsy  What you can do to prevent a seizure: You may not be able to prevent every seizure  The following can help you manage triggers that may make a seizure start:  · Take your medicine every day at the same time  This will also help prevent medicine side effects  Set an alarm to help remind you to take your medicine every day  · Manage stress  Stress can be a trigger for epilepsy  Exercise can help you reduce stress  Talk to your healthcare provider about exercise that is safe for you  Illness can be a form of stress  Eat a variety of healthy foods and drink plenty of liquids during an illness  Talk to your healthcare provider about other ways to manage stress  · Set a regular sleep schedule  A lack of sleep can trigger a seizure  Try to go to sleep and wake up at the same time every day  Keep your bedroom quiet and dark  Talk to your healthcare provider if you are having trouble sleeping  · Limit or do not drink alcohol as directed  Alcohol can trigger a seizure, especially if you drink a large amount at one time  A drink of alcohol is 12 ounces of beer, 1½ ounces of liquor, or 5 ounces of wine  Talk to your healthcare provider about a safe amount of alcohol for you  Your provider may recommend that you do not drink any alcohol  Tell him or her if you need help to quit drinking  What you can do to manage epilepsy:   · Keep a seizure diary  This can help you find your triggers and avoid them  Write down the dates of your seizures, where you were, and what you were doing  Include how you felt before and after  Possible triggers include illness, lack of sleep, hormonal changes, alcohol, drugs, lights, or stress  · Record any auras you have before a seizure  An aura is a sign that you are about to have a seizure  Auras happen before certain types of seizures that are in only 1 part of the brain   The aura may happen seconds before a seizure, or up to an hour before  You may feel, see, hear, or smell something  Examples include part of your body becoming hot  You may see a flash of light or hear something  You may have anxiety or déjà vu  If you have an aura, include it in your seizure diary  · Create a care plan  Tell family, friends, and coworkers about your epilepsy  Give them instructions that tell them how they can keep you safe if you have a seizure  · Find support  You may be referred to a psychologist or   Ask your healthcare provider about support groups for people with epilepsy  · Ask what safety precautions you should take  Talk with your healthcare provider about driving  You may not be able to drive until you are seizure-free for a period of time  You will need to check the law where you live  Also talk to your healthcare provider about swimming and bathing  You may drown or develop life-threatening heart or lung damage if you have a seizure in water  · Carry medical alert identification  Wear medical alert jewelry or carry a card that says you have epilepsy  Ask your healthcare provider where to get these items  How others can keep you safe during a seizure:  Give the following instructions to family, friends, and coworkers:  · Do not panic  · Do not hold me down or put anything in my mouth  · Gently guide me to the floor or a soft surface  · Place me on my side to help prevent me from swallowing saliva or vomit  · Protect me from injury  Remove sharp or hard objects from the area surrounding me, or cushion my head  · Loosen the clothing around my head and neck  · Time how long my seizure lasts  Call 911 if my seizure lasts longer than 5 minutes or if I have a second seizure  · Stay with me until my seizure ends  Let me rest until I am fully awake  · Perform CPR if I stop breathing or you cannot feel my pulse       · Do not give me anything to eat or drink until I am fully awake  Follow up with your neurologist as directed: You may need tests to check the level of antiseizure medicine in your blood  Your neurologist may need to change or adjust your medicine  Write down your questions so you remember to ask them during your visits  © 2017 [address] [employer] Information is for End User's use only and may not be sold, redistributed or otherwise used for commercial purposes  All illustrations and images included in CareNotes® are the copyrighted property of [employer] , [employer]  or Jason Chiu  The above information is an  only  It is not intended as medical advice for individual conditions or treatments  Talk to your doctor, nurse or pharmacist before following any medical regimen to see if it is safe and effective for you

## 2017-11-27 NOTE — SOCIAL WORK
CM was informed that pt is medically stable for dc today  CM spoke to Sandi Bar in admission at [facility] who states staff is available to accept pt today  CM called [facility] to arrange for transport--no availability  CM arranged with [facility] for a 3pm dc to [facility]  CM notified pts bedside RN Sasha Alejandra at [facility], and message left for pts mother and father regarding dc time  Facility transfer form and CMN completed  Chart copy requested

## 2017-11-27 NOTE — PROGRESS NOTES
Rounding note with Dr Ronnie Mendenhall patient and spoke about possible return to [facility] today

## 2017-11-30 ENCOUNTER — HOSPITAL ENCOUNTER (EMERGENCY)
Facility: HOSPITAL | Age: 37
End: 2017-12-01
Attending: EMERGENCY MEDICINE | Admitting: EMERGENCY MEDICINE
Payer: MEDICARE

## 2017-11-30 DIAGNOSIS — G40.909 RECURRENT SEIZURES (HCC): Primary | ICD-10-CM

## 2017-11-30 LAB
ALBUMIN SERPL BCP-MCNC: 3.4 G/DL (ref 3.5–5)
ALP SERPL-CCNC: 95 U/L (ref 46–116)
ALT SERPL W P-5'-P-CCNC: 40 U/L (ref 12–78)
ANION GAP SERPL CALCULATED.3IONS-SCNC: 12 MMOL/L (ref 4–13)
APTT PPP: 29 SECONDS (ref 23–35)
AST SERPL W P-5'-P-CCNC: 27 U/L (ref 5–45)
BASOPHILS # BLD AUTO: 0.04 THOUSANDS/ΜL (ref 0–0.1)
BASOPHILS NFR BLD AUTO: 1 % (ref 0–1)
BILIRUB SERPL-MCNC: 0.2 MG/DL (ref 0.2–1)
BUN SERPL-MCNC: 15 MG/DL (ref 5–25)
CALCIUM SERPL-MCNC: 9.1 MG/DL (ref 8.3–10.1)
CHLORIDE SERPL-SCNC: 107 MMOL/L (ref 100–108)
CO2 SERPL-SCNC: 23 MMOL/L (ref 21–32)
CREAT SERPL-MCNC: 0.54 MG/DL (ref 0.6–1.3)
EOSINOPHIL # BLD AUTO: 0.06 THOUSAND/ΜL (ref 0–0.61)
EOSINOPHIL NFR BLD AUTO: 1 % (ref 0–6)
ERYTHROCYTE [DISTWIDTH] IN BLOOD BY AUTOMATED COUNT: 12.5 % (ref 11.6–15.1)
GFR SERPL CREATININE-BSD FRML MDRD: 122 ML/MIN/1.73SQ M
GLUCOSE SERPL-MCNC: 143 MG/DL (ref 65–140)
HCT VFR BLD AUTO: 40.6 % (ref 34.8–46.1)
HGB BLD-MCNC: 13.3 G/DL (ref 11.5–15.4)
INR PPP: 1.1 (ref 0.86–1.16)
LACTATE SERPL-SCNC: 1.8 MMOL/L (ref 0.5–2)
LYMPHOCYTES # BLD AUTO: 2.15 THOUSANDS/ΜL (ref 0.6–4.47)
LYMPHOCYTES NFR BLD AUTO: 26 % (ref 14–44)
MCH RBC QN AUTO: 32.7 PG (ref 26.8–34.3)
MCHC RBC AUTO-ENTMCNC: 32.8 G/DL (ref 31.4–37.4)
MCV RBC AUTO: 100 FL (ref 82–98)
MONOCYTES # BLD AUTO: 1.14 THOUSAND/ΜL (ref 0.17–1.22)
MONOCYTES NFR BLD AUTO: 14 % (ref 4–12)
NEUTROPHILS # BLD AUTO: 4.9 THOUSANDS/ΜL (ref 1.85–7.62)
NEUTS SEG NFR BLD AUTO: 58 % (ref 43–75)
PLATELET # BLD AUTO: 205 THOUSANDS/UL (ref 149–390)
PMV BLD AUTO: 11.8 FL (ref 8.9–12.7)
POTASSIUM SERPL-SCNC: 3.7 MMOL/L (ref 3.5–5.3)
PROT SERPL-MCNC: 7.9 G/DL (ref 6.4–8.2)
PROTHROMBIN TIME: 14.1 SECONDS (ref 12.1–14.4)
RBC # BLD AUTO: 4.07 MILLION/UL (ref 3.81–5.12)
SODIUM SERPL-SCNC: 142 MMOL/L (ref 136–145)
TROPONIN I SERPL-MCNC: 0.03 NG/ML
WBC # BLD AUTO: 8.29 THOUSAND/UL (ref 4.31–10.16)

## 2017-11-30 PROCEDURE — 36415 COLL VENOUS BLD VENIPUNCTURE: CPT

## 2017-11-30 PROCEDURE — 87040 BLOOD CULTURE FOR BACTERIA: CPT | Performed by: EMERGENCY MEDICINE

## 2017-11-30 PROCEDURE — 85025 COMPLETE CBC W/AUTO DIFF WBC: CPT | Performed by: EMERGENCY MEDICINE

## 2017-11-30 PROCEDURE — 83605 ASSAY OF LACTIC ACID: CPT | Performed by: EMERGENCY MEDICINE

## 2017-11-30 PROCEDURE — 84100 ASSAY OF PHOSPHORUS: CPT | Performed by: EMERGENCY MEDICINE

## 2017-11-30 PROCEDURE — 83735 ASSAY OF MAGNESIUM: CPT | Performed by: EMERGENCY MEDICINE

## 2017-11-30 PROCEDURE — 85610 PROTHROMBIN TIME: CPT | Performed by: EMERGENCY MEDICINE

## 2017-11-30 PROCEDURE — 80164 ASSAY DIPROPYLACETIC ACD TOT: CPT | Performed by: EMERGENCY MEDICINE

## 2017-11-30 PROCEDURE — 93005 ELECTROCARDIOGRAM TRACING: CPT | Performed by: EMERGENCY MEDICINE

## 2017-11-30 PROCEDURE — 84484 ASSAY OF TROPONIN QUANT: CPT | Performed by: EMERGENCY MEDICINE

## 2017-11-30 PROCEDURE — 85730 THROMBOPLASTIN TIME PARTIAL: CPT | Performed by: EMERGENCY MEDICINE

## 2017-11-30 PROCEDURE — 80053 COMPREHEN METABOLIC PANEL: CPT | Performed by: EMERGENCY MEDICINE

## 2017-11-30 RX ORDER — CLOBAZAM 10 MG/1
20 TABLET ORAL
COMMUNITY
End: 2017-12-07 | Stop reason: HOSPADM

## 2017-11-30 RX ORDER — POTASSIUM CHLORIDE 20MEQ/15ML
20 LIQUID (ML) ORAL DAILY
COMMUNITY
End: 2018-10-13 | Stop reason: HOSPADM

## 2017-11-30 RX ADMIN — SODIUM CHLORIDE 1000 ML: 0.9 INJECTION, SOLUTION INTRAVENOUS at 23:31

## 2017-12-01 ENCOUNTER — HOSPITAL ENCOUNTER (INPATIENT)
Facility: HOSPITAL | Age: 37
LOS: 6 days | Discharge: NURSING, REHAB OR LONG-TERM CARE | DRG: 100 | End: 2017-12-07
Attending: INTERNAL MEDICINE | Admitting: ANESTHESIOLOGY
Payer: MEDICARE

## 2017-12-01 ENCOUNTER — APPOINTMENT (EMERGENCY)
Dept: RADIOLOGY | Facility: HOSPITAL | Age: 37
End: 2017-12-01
Payer: MEDICARE

## 2017-12-01 ENCOUNTER — APPOINTMENT (INPATIENT)
Dept: RADIOLOGY | Facility: HOSPITAL | Age: 37
DRG: 100 | End: 2017-12-01
Payer: MEDICARE

## 2017-12-01 ENCOUNTER — APPOINTMENT (EMERGENCY)
Dept: CT IMAGING | Facility: HOSPITAL | Age: 37
End: 2017-12-01
Payer: MEDICARE

## 2017-12-01 ENCOUNTER — APPOINTMENT (INPATIENT)
Dept: NEUROLOGY | Facility: AMBULATORY SURGERY CENTER | Age: 37
DRG: 100 | End: 2017-12-01
Payer: MEDICARE

## 2017-12-01 VITALS
HEART RATE: 120 BPM | OXYGEN SATURATION: 96 % | TEMPERATURE: 100.7 F | RESPIRATION RATE: 22 BRPM | SYSTOLIC BLOOD PRESSURE: 113 MMHG | WEIGHT: 110.89 LBS | DIASTOLIC BLOOD PRESSURE: 70 MMHG | BODY MASS INDEX: 21.66 KG/M2

## 2017-12-01 DIAGNOSIS — J96.01 ACUTE RESPIRATORY FAILURE WITH HYPOXIA (HCC): ICD-10-CM

## 2017-12-01 DIAGNOSIS — R56.9 SEIZURE (HCC): ICD-10-CM

## 2017-12-01 DIAGNOSIS — G40.813 INTRACTABLE LENNOX-GASTAUT SYNDROME WITH STATUS EPILEPTICUS (HCC): Primary | ICD-10-CM

## 2017-12-01 LAB
AMMONIA PLAS-SCNC: 79 UMOL/L (ref 11–35)
ARTERIAL PATENCY WRIST A: YES
ATRIAL RATE: 117 BPM
BASE EXCESS BLDA CALC-SCNC: -6.4 MMOL/L
BILIRUB UR QL STRIP: NEGATIVE
CLARITY UR: CLEAR
COLOR UR: YELLOW
GLUCOSE UR STRIP-MCNC: NEGATIVE MG/DL
HCO3 BLDA-SCNC: 18.7 MMOL/L (ref 22–28)
HGB UR QL STRIP.AUTO: NEGATIVE
KETONES UR STRIP-MCNC: NEGATIVE MG/DL
LEUKOCYTE ESTERASE UR QL STRIP: NEGATIVE
MAGNESIUM SERPL-MCNC: 2.1 MG/DL (ref 1.6–2.6)
NASAL CANNULA: 4
NITRITE UR QL STRIP: NEGATIVE
O2 CT BLDA-SCNC: 19 ML/DL (ref 16–23)
OXYHGB MFR BLDA: 98.2 % (ref 94–97)
P AXIS: 76 DEGREES
PCO2 BLDA: 36.1 MM HG (ref 36–44)
PH BLDA: 7.33 [PH] (ref 7.35–7.45)
PH UR STRIP.AUTO: 6.5 [PH] (ref 4.5–8)
PHOSPHATE SERPL-MCNC: 4 MG/DL (ref 2.7–4.5)
PO2 BLDA: 151 MM HG (ref 75–129)
PR INTERVAL: 120 MS
PROT UR STRIP-MCNC: NEGATIVE MG/DL
QRS AXIS: 81 DEGREES
QRSD INTERVAL: 70 MS
QT INTERVAL: 306 MS
QTC INTERVAL: 426 MS
SP GR UR STRIP.AUTO: 1.02 (ref 1–1.03)
SPECIMEN SOURCE: ABNORMAL
T WAVE AXIS: 48 DEGREES
UROBILINOGEN UR QL STRIP.AUTO: 0.2 E.U./DL
VALPROATE SERPL-MCNC: 71 UG/ML (ref 50–100)
VENTRICULAR RATE: 117 BPM

## 2017-12-01 PROCEDURE — 94762 N-INVAS EAR/PLS OXIMTRY CONT: CPT

## 2017-12-01 PROCEDURE — 82140 ASSAY OF AMMONIA: CPT | Performed by: NURSE PRACTITIONER

## 2017-12-01 PROCEDURE — 71010 HB CHEST X-RAY 1 VIEW FRONTAL (PORTABLE): CPT

## 2017-12-01 PROCEDURE — 82805 BLOOD GASES W/O2 SATURATION: CPT | Performed by: NURSE PRACTITIONER

## 2017-12-01 PROCEDURE — 80177 DRUG SCRN QUAN LEVETIRACETAM: CPT | Performed by: EMERGENCY MEDICINE

## 2017-12-01 PROCEDURE — 99285 EMERGENCY DEPT VISIT HI MDM: CPT

## 2017-12-01 PROCEDURE — 95951 HB EEG MONITORING/VIDEORECORD: CPT

## 2017-12-01 PROCEDURE — 36415 COLL VENOUS BLD VENIPUNCTURE: CPT | Performed by: EMERGENCY MEDICINE

## 2017-12-01 PROCEDURE — 99284 EMERGENCY DEPT VISIT MOD MDM: CPT

## 2017-12-01 PROCEDURE — 87040 BLOOD CULTURE FOR BACTERIA: CPT | Performed by: EMERGENCY MEDICINE

## 2017-12-01 PROCEDURE — 70450 CT HEAD/BRAIN W/O DYE: CPT

## 2017-12-01 PROCEDURE — 81003 URINALYSIS AUTO W/O SCOPE: CPT | Performed by: INTERNAL MEDICINE

## 2017-12-01 PROCEDURE — 36600 WITHDRAWAL OF ARTERIAL BLOOD: CPT

## 2017-12-01 PROCEDURE — 93005 ELECTROCARDIOGRAM TRACING: CPT

## 2017-12-01 RX ORDER — LEVOCARNITINE 1 G/10ML
500 SOLUTION ORAL 3 TIMES DAILY
Status: DISCONTINUED | OUTPATIENT
Start: 2017-12-01 | End: 2017-12-05

## 2017-12-01 RX ORDER — ONDANSETRON 2 MG/ML
4 INJECTION INTRAMUSCULAR; INTRAVENOUS EVERY 6 HOURS PRN
Status: DISCONTINUED | OUTPATIENT
Start: 2017-12-01 | End: 2017-12-01

## 2017-12-01 RX ORDER — MULTIVIT WITH IRON,MINERALS
15 LIQUID (ML) ORAL DAILY
Status: DISCONTINUED | OUTPATIENT
Start: 2017-12-01 | End: 2017-12-07 | Stop reason: HOSPADM

## 2017-12-01 RX ORDER — DEXTROSE, SODIUM CHLORIDE, AND POTASSIUM CHLORIDE 5; .45; .15 G/100ML; G/100ML; G/100ML
75 INJECTION INTRAVENOUS CONTINUOUS
Status: DISCONTINUED | OUTPATIENT
Start: 2017-12-01 | End: 2017-12-01

## 2017-12-01 RX ORDER — DEXTROSE AND SODIUM CHLORIDE 5; .9 G/100ML; G/100ML
75 INJECTION, SOLUTION INTRAVENOUS CONTINUOUS
Status: DISCONTINUED | OUTPATIENT
Start: 2017-12-01 | End: 2017-12-02

## 2017-12-01 RX ORDER — LACTULOSE 20 G/30ML
45 SOLUTION ORAL 3 TIMES DAILY
Status: ON HOLD | COMMUNITY
End: 2018-08-24

## 2017-12-01 RX ORDER — SODIUM CHLORIDE 450 MG/100ML
75 INJECTION, SOLUTION INTRAVENOUS CONTINUOUS
Status: DISCONTINUED | OUTPATIENT
Start: 2017-12-01 | End: 2017-12-01

## 2017-12-01 RX ORDER — LANOLIN ALCOHOL/MO/W.PET/CERES
6 CREAM (GRAM) TOPICAL
Status: DISCONTINUED | OUTPATIENT
Start: 2017-12-01 | End: 2017-12-07 | Stop reason: HOSPADM

## 2017-12-01 RX ORDER — CLOBAZAM 10 MG/1
20 TABLET ORAL
Status: DISCONTINUED | OUTPATIENT
Start: 2017-12-01 | End: 2017-12-01

## 2017-12-01 RX ORDER — LACTULOSE 20 G/30ML
35 SOLUTION ORAL 3 TIMES DAILY
Status: DISCONTINUED | OUTPATIENT
Start: 2017-12-01 | End: 2017-12-07 | Stop reason: HOSPADM

## 2017-12-01 RX ORDER — CLOBAZAM 10 MG/1
5 TABLET ORAL DAILY
Status: DISCONTINUED | OUTPATIENT
Start: 2017-12-01 | End: 2017-12-07 | Stop reason: HOSPADM

## 2017-12-01 RX ORDER — POTASSIUM CHLORIDE 20MEQ/15ML
40 LIQUID (ML) ORAL DAILY
Status: DISCONTINUED | OUTPATIENT
Start: 2017-12-01 | End: 2017-12-07 | Stop reason: HOSPADM

## 2017-12-01 RX ORDER — ACETAMINOPHEN 160 MG/5ML
650 SUSPENSION, ORAL (FINAL DOSE FORM) ORAL EVERY 4 HOURS PRN
Status: DISCONTINUED | OUTPATIENT
Start: 2017-12-01 | End: 2017-12-07 | Stop reason: HOSPADM

## 2017-12-01 RX ORDER — MINERAL OIL AND PETROLATUM 150; 830 MG/G; MG/G
1 OINTMENT OPHTHALMIC 3 TIMES DAILY
Status: DISCONTINUED | OUTPATIENT
Start: 2017-12-01 | End: 2017-12-07 | Stop reason: HOSPADM

## 2017-12-01 RX ORDER — CLOBAZAM 10 MG/1
15 TABLET ORAL
Status: DISCONTINUED | OUTPATIENT
Start: 2017-12-01 | End: 2017-12-07 | Stop reason: HOSPADM

## 2017-12-01 RX ORDER — VALPROIC ACID 250 MG/5ML
250 SOLUTION ORAL EVERY 8 HOURS
Status: DISCONTINUED | OUTPATIENT
Start: 2017-12-01 | End: 2017-12-01

## 2017-12-01 RX ORDER — VALPROIC ACID 250 MG/5ML
350 SOLUTION ORAL EVERY 8 HOURS
Status: DISCONTINUED | OUTPATIENT
Start: 2017-12-01 | End: 2017-12-02

## 2017-12-01 RX ORDER — SACCHAROMYCES BOULARDII 250 MG
250 CAPSULE ORAL 2 TIMES DAILY
Status: DISCONTINUED | OUTPATIENT
Start: 2017-12-01 | End: 2017-12-07 | Stop reason: HOSPADM

## 2017-12-01 RX ORDER — RUFINAMIDE 200 MG/1
1600 TABLET, FILM COATED ORAL 2 TIMES DAILY
Status: DISCONTINUED | OUTPATIENT
Start: 2017-12-01 | End: 2017-12-04

## 2017-12-01 RX ORDER — LORAZEPAM 2 MG/ML
1 INJECTION INTRAMUSCULAR EVERY 4 HOURS PRN
Status: DISCONTINUED | OUTPATIENT
Start: 2017-12-01 | End: 2017-12-01

## 2017-12-01 RX ORDER — LACTULOSE 20 G/30ML
20 SOLUTION ORAL 3 TIMES DAILY
Status: DISCONTINUED | OUTPATIENT
Start: 2017-12-01 | End: 2017-12-01

## 2017-12-01 RX ORDER — LEVETIRACETAM 100 MG/ML
1000 SOLUTION ORAL EVERY 12 HOURS SCHEDULED
Status: DISCONTINUED | OUTPATIENT
Start: 2017-12-01 | End: 2017-12-07 | Stop reason: HOSPADM

## 2017-12-01 RX ORDER — AMOXICILLIN 250 MG
1 CAPSULE ORAL DAILY
Status: DISCONTINUED | OUTPATIENT
Start: 2017-12-01 | End: 2017-12-07 | Stop reason: HOSPADM

## 2017-12-01 RX ORDER — LORAZEPAM 2 MG/ML
1 INJECTION INTRAMUSCULAR EVERY 4 HOURS PRN
Status: DISCONTINUED | OUTPATIENT
Start: 2017-12-01 | End: 2017-12-01 | Stop reason: SDUPTHER

## 2017-12-01 RX ORDER — ONDANSETRON 2 MG/ML
4 INJECTION INTRAMUSCULAR; INTRAVENOUS EVERY 6 HOURS PRN
Status: DISCONTINUED | OUTPATIENT
Start: 2017-12-01 | End: 2017-12-07 | Stop reason: HOSPADM

## 2017-12-01 RX ADMIN — VALPROATE SODIUM 1000 MG: 100 INJECTION, SOLUTION INTRAVENOUS at 01:33

## 2017-12-01 RX ADMIN — ACETAMINOPHEN 650 MG: 160 SUSPENSION ORAL at 11:28

## 2017-12-01 RX ADMIN — LEVETIRACETAM 1000 MG: 100 SOLUTION ORAL at 21:49

## 2017-12-01 RX ADMIN — CEFEPIME HYDROCHLORIDE 2000 MG: 2 INJECTION, SOLUTION INTRAVENOUS at 09:45

## 2017-12-01 RX ADMIN — RUFINAMIDE 1600 MG: 200 TABLET, FILM COATED ORAL at 21:46

## 2017-12-01 RX ADMIN — LEVOCARNITINE 500 MG: 1 SOLUTION ORAL at 08:54

## 2017-12-01 RX ADMIN — TOPIRAMATE 250 MG: 100 TABLET, FILM COATED ORAL at 08:44

## 2017-12-01 RX ADMIN — PERAMPANEL 8 MG: 4 TABLET ORAL at 09:42

## 2017-12-01 RX ADMIN — MELATONIN TAB 3 MG 6 MG: 3 TAB at 21:46

## 2017-12-01 RX ADMIN — DEXTROSE, SODIUM CHLORIDE, AND POTASSIUM CHLORIDE 75 ML/HR: 5; .45; .15 INJECTION INTRAVENOUS at 08:50

## 2017-12-01 RX ADMIN — LACTULOSE 35 G: 20 SOLUTION ORAL at 09:42

## 2017-12-01 RX ADMIN — DEXTROSE AND SODIUM CHLORIDE 75 ML/HR: 5; .9 INJECTION, SOLUTION INTRAVENOUS at 14:49

## 2017-12-01 RX ADMIN — LEVOCARNITINE 500 MG: 1 SOLUTION ORAL at 16:18

## 2017-12-01 RX ADMIN — Medication 1 TABLET: at 08:44

## 2017-12-01 RX ADMIN — LACTULOSE 35 G: 20 SOLUTION ORAL at 16:18

## 2017-12-01 RX ADMIN — LEVETIRACETAM 1000 MG: 100 SOLUTION ORAL at 08:54

## 2017-12-01 RX ADMIN — Medication 250 MG: at 18:46

## 2017-12-01 RX ADMIN — Medication 250 MG: at 08:45

## 2017-12-01 RX ADMIN — RUFINAMIDE 1600 MG: 200 TABLET, FILM COATED ORAL at 11:13

## 2017-12-01 RX ADMIN — LACTULOSE 35 G: 20 SOLUTION ORAL at 21:43

## 2017-12-01 RX ADMIN — TOPIRAMATE 250 MG: 100 TABLET, FILM COATED ORAL at 18:46

## 2017-12-01 RX ADMIN — ENOXAPARIN SODIUM 40 MG: 40 INJECTION SUBCUTANEOUS at 09:42

## 2017-12-01 RX ADMIN — VALPROIC ACID 250 MG: 250 SOLUTION ORAL at 08:42

## 2017-12-01 RX ADMIN — LEVOCARNITINE 500 MG: 1 SOLUTION ORAL at 21:49

## 2017-12-01 RX ADMIN — CLOBAZAM 15 MG: 10 TABLET ORAL at 21:45

## 2017-12-01 RX ADMIN — ACETAMINOPHEN 650 MG: 160 SUSPENSION ORAL at 21:51

## 2017-12-01 RX ADMIN — METRONIDAZOLE 500 MG: 500 INJECTION, SOLUTION INTRAVENOUS at 23:16

## 2017-12-01 RX ADMIN — MINERAL OIL AND WHITE PETROLATUM 1 APPLICATION: 150; 830 OINTMENT OPHTHALMIC at 21:44

## 2017-12-01 RX ADMIN — VALPROIC ACID 350 MG: 500 SOLUTION ORAL at 16:18

## 2017-12-01 RX ADMIN — VANCOMYCIN HYDROCHLORIDE 750 MG: 750 INJECTION, SOLUTION INTRAVENOUS at 21:45

## 2017-12-01 RX ADMIN — MULTIVITAMIN 15 ML: LIQUID ORAL at 08:54

## 2017-12-01 RX ADMIN — MINERAL OIL AND WHITE PETROLATUM 1 APPLICATION: 150; 830 OINTMENT OPHTHALMIC at 16:43

## 2017-12-01 RX ADMIN — LORAZEPAM 1 MG: 2 INJECTION INTRAMUSCULAR; INTRAVENOUS at 23:17

## 2017-12-01 RX ADMIN — CEFEPIME HYDROCHLORIDE 2000 MG: 2 INJECTION, POWDER, FOR SOLUTION INTRAVENOUS at 18:49

## 2017-12-01 RX ADMIN — CLOBAZAM 5 MG: 10 TABLET ORAL at 21:44

## 2017-12-01 RX ADMIN — POTASSIUM CHLORIDE 40 MEQ: 20 SOLUTION ORAL at 08:41

## 2017-12-01 NOTE — ED NOTES
Just witnessed a seizure  Lasted about 15 seconds in total  Bilateral hand movement        Fina Mathis RN  12/01/17 9789

## 2017-12-01 NOTE — ED PROVIDER NOTES
History  Chief Complaint   Patient presents with    Seizure - Prior Hx Of     Patient has been having seizures every 10-15 minutes since alittle after 7pm  Some seizures were as often as 2-3 minutes  Patient has a  prior hx        61-year-old female with history of intellectual disability seizure disorder hyperammonemia was recently admitted to [facility] from -[facility] for somnolence  There is believed to be due to her seizure medicines her a m  dose of Onfi was stopped she was monitored on the vEEG which did show seizure eactivity but meds were not changed she is already on 6 medications for her seizures as well as the vagus nerve stimulator  her ammonia level was treated with increased lactulose and level  to baseline prior to discharge  She did have an acute middle ear effusion ENT was consult it was felt to be viral   She presents for continued seizures  Per caregiver at 3 PM  she was her activity seemed at baseline she was eating snacks a but then after 1800 hours she started having seizures which consists of eye rolling face twitching and decorticate posturing of upper extremities    She had appros 7 seizures  She was given 2 mg of Ativan by EMS and as which did not change anything followed by 5 mg of midazolam IV  a total  3 seizures were witnessed by BLS she approximately; last sz was just PTA she desat'ed with admin of [facility] and NRB was started  patient is nonverbal; hx obtained from EMS and caregivers            Prior to Admission Medications   Prescriptions Last Dose Informant Patient Reported? Taking?    Multiple Vitamins-Minerals (CENTRUM SILVER PO) 2017 at 0900  Yes Yes   Si tablet by Per G Tube route daily   Perampanel 8 MG TABS  at 0900  Yes Yes   Si tablet by Per G Tube route daily   Probiotic Product (ALEXANDER-BID PROBIOTIC PO) 2017 at 0900  Yes Yes   Si tablet by Per G Tube route daily     Rufinamide (BANZEL PO) 2017 at 1700  Yes Yes   Si,600 mg by Per G Tube route 2 (two) times a day     Sennosides-Docusate Sodium (SENEXON-S PO) 2017 at 0900  Yes Yes   Si tablet by Per G Tube route daily     TOPIRAMATE PO 2017 at 2100  Yes Yes   Si mg by Per G Tube route 2 (two) times a day   Valproate Sodium (VALPROIC ACID) 250 MG/5ML SOLN 2017 at 2200  Yes Yes   Si mL by Per G Tube route every 8 (eight) hours     White Petrolatum-Mineral Oil (SYSTANE NIGHTTIME) OINT 2017 at 1700  Yes Yes   Sig: Apply 1 application to eye 3 (three) times a day Both eyes    cloBAZam (ONFI) tablet 2017 at 2100  Yes Yes   Si mg by Per G Tube route daily at bedtime   lactulose 20 g/30 mL 2017 at 1700  Yes Yes   Si g by Per G Tube route 3 (three) times a day   levETIRAcetam (KEPPRA) 100 mg/mL oral solution 2017 at 2100  No Yes   Sig: 10 mL by Per G Tube route every 12 (twelve) hours for 30 days   levOCARNitine (CARNITOR) 1 g/10 mL solution 2017 at 1700  No Yes   Si mL by Per G Tube route 3 (three) times a day for 30 days   melatonin 3 mg 2017 at 2100  No Yes   Sig: Take 2 tablets by mouth daily at bedtime for 30 days   Patient taking differently: 6 mg by Per G Tube route daily at bedtime     potassium chloride 10 % 2017 at 0900  Yes Yes   Si mEq by Per G Tube route daily      Facility-Administered Medications: None       Past Medical History:   Diagnosis Date    ADHD     Anoxic brain damage (HCC)     Autistic disorder     Epilepsy ([address] )     Hyperammonemia (HCC)     Hyperkeratosis     Hypotension     Hypotension     Intellectual disability     Intellectual disability     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with tonic seizures (HCC)     Lethargy     Liver enzyme elevation     Onychomycosis     Osteoporosis     Osteoporosis     Psychiatric disorder     anxiety    Seizures (HCC)        Past Surgical History:   Procedure Laterality Date    CARDIAC PACEMAKER PLACEMENT      JEJUNOSTOMY FEEDING TUBE      PEG TUBE PLACEMENT         History reviewed  No pertinent family history  I have reviewed and agree with the history as documented  Social History   Substance Use Topics    Smoking status: Never Smoker    Smokeless tobacco: Never Used    Alcohol use No        Review of Systems   Unable to perform ROS: Patient nonverbal       Physical Exam  ED Triage Vitals   Temperature Pulse Respirations Blood Pressure SpO2   11/30/17 2321 11/30/17 2321 11/30/17 2321 11/30/17 2321 11/30/17 2321   100 5 °F (38 1 °C) 105 22 121/67 98 %      Temp Source Heart Rate Source Patient Position - Orthostatic VS BP Location FiO2 (%)   11/30/17 2321 11/30/17 2321 11/30/17 2321 11/30/17 2321 --   Temporal Monitor Lying Left arm       Pain Score       12/01/17 0339       2           Orthostatic Vital Signs  Vitals:    12/01/17 0339 12/01/17 0345 12/01/17 0400 12/01/17 0430   BP: 112/68 112/68 115/72 113/70   Pulse: (!) 118 (!) 118 (!) 120 (!) 120   Patient Position - Orthostatic VS:    Lying       Physical Exam   Constitutional: No distress  Chronically ill appearing   HENT:   Head: Normocephalic and atraumatic  Right Ear: External ear normal    Left Ear: External ear normal    Nose: Nose normal    Mouth/Throat: Oropharynx is clear and moist    Rt TM pale; Left TM effusion no erythema   Eyes: Conjunctivae are normal  Pupils are equal, round, and reactive to light  Right eye exhibits no discharge  Left eye exhibits no discharge  No nystagmus   Neck: Normal range of motion  Neck supple  Cardiovascular: Normal rate, regular rhythm and normal heart sounds  Pulmonary/Chest: Effort normal and breath sounds normal  No respiratory distress  Abdominal: Soft  Bowel sounds are normal  She exhibits no distension  There is no tenderness (peg tube)  Back no CVA tenderness   Musculoskeletal: She exhibits no edema  Lymphadenopathy:     She has no cervical adenopathy  Neurological:   Somulent  Does not follow commands  No facial droop  occassional tremor of upper extremities  No cogwheeling or rigidity of  Extremities  Skin: Skin is warm and dry  She is not diaphoretic  Psychiatric:   Unable to assess   Vitals reviewed  ED Medications  Medications    EMS REPLENISHMENT MED ( Does not apply Given to EMS 11/30/17 2328)   sodium chloride 0 9 % bolus 1,000 mL (0 mL Intravenous Stopped 12/1/17 0132)   valproate (DEPACON) 1,000 mg in sodium chloride 0 9 % 50 mL IVPB (0 mg Intravenous Stopped 12/1/17 0235)       Diagnostic Studies  Results Reviewed     Procedure Component Value Units Date/Time    Levetiracetam level [[id number]] Collected:  12/01/17 0048    Lab Status: In process Specimen:  Blood from Arm, Left Updated:  12/01/17 0051    Blood culture #1 [[id number]] Collected:  12/01/17 0048    Lab Status: In process Specimen:  Blood from Arm, Left Updated:  12/01/17 0051    Magnesium [[id number]]  (Normal) Collected:  11/30/17 2329    Lab Status:  Final result Specimen:  Blood from Arm, Left Updated:  12/01/17 0037     Magnesium 2 1 mg/dL     Phosphorus [[id number]]  (Normal) Collected:  11/30/17 2329    Lab Status:  Final result Specimen:  Blood from Arm, Left Updated:  12/01/17 0037     Phosphorus 4 0 mg/dL     Valproic acid level, total [[id number]]  (Normal) Collected:  11/30/17 2329    Lab Status:  Final result Specimen:  Blood from Arm, Left Updated:  12/01/17 0037     Valproic Acid, Total 71 ug/mL     Lactic Acid x2 [[id number]]  (Normal) Collected:  11/30/17 2329    Lab Status:  Final result Specimen:  Blood from Arm, Left Updated:  11/30/17 2354     LACTIC ACID 1 8 mmol/L     Narrative:         Result may be elevated if tourniquet was used during collection      Troponin I [[id number]]  (Normal) Collected:  11/30/17 2329    Lab Status:  Final result Specimen:  Blood from Arm, Left Updated:  11/30/17 2354     Troponin I 0 03 ng/mL     Narrative:         [employer] Chemistry analyzer 99% cutoff is > 0 04 ng/mL in network labs    o cTnI 99% cutoff is useful only when applied to patients in the clinical setting of myocardial ischemia  o cTnI 99% cutoff should be interpreted in the context of clinical history, ECG findings and possibly cardiac imaging to establish correct diagnosis  o cTnI 99% cutoff may be suggestive but clearly not indicative of a coronary event without the clinical setting of myocardial ischemia  Comprehensive metabolic panel [[id number]]  (Abnormal) Collected:  11/30/17 2329    Lab Status:  Final result Specimen:  Blood from Arm, Left Updated:  11/30/17 2351     Sodium 142 mmol/L      Potassium 3 7 mmol/L      Chloride 107 mmol/L      CO2 23 mmol/L      Anion Gap 12 mmol/L      BUN 15 mg/dL      Creatinine 0 54 (L) mg/dL      Glucose 143 (H) mg/dL      Calcium 9 1 mg/dL      AST 27 U/L      ALT 40 U/L      Alkaline Phosphatase 95 U/L      Total Protein 7 9 g/dL      Albumin 3 4 (L) g/dL      Total Bilirubin 0 20 mg/dL      eGFR 122 ml/min/1 73sq m     Narrative:         National Kidney Disease Education Program recommendations are as follows:  GFR calculation is accurate only with a steady state creatinine  Chronic Kidney disease less than 60 ml/min/1 73 sq  meters  Kidney failure less than 15 ml/min/1 73 sq  meters  Gifty Whiteside [[id number]]  (Normal) Collected:  11/30/17 2329    Lab Status:  Final result Specimen:  Blood from Arm, Left Updated:  11/30/17 2346     Protime 14 1 seconds      INR 1 10    APTT [[id number]]  (Normal) Collected:  11/30/17 2329    Lab Status:  Final result Specimen:  Blood from Arm, Left Updated:  11/30/17 2346     PTT 29 seconds     Narrative:          Therapeutic Heparin Range = 60-90 seconds    CBC and differential [[id number]]  (Abnormal) Collected:  11/30/17 2329    Lab Status:  Final result Specimen:  Blood from Arm, Left Updated:  11/30/17 2336     WBC 8 29 Thousand/uL      RBC 4 07 Million/uL      Hemoglobin 13 3 g/dL      Hematocrit 40 6 %       (H) fL      MCH 32 7 pg      MCHC 32 8 g/dL      RDW 12 5 %      MPV 11 8 fL      Platelets 612 Thousands/uL      Neutrophils Relative 58 %      Lymphocytes Relative 26 %      Monocytes Relative 14 (H) %      Eosinophils Relative 1 %      Basophils Relative 1 %      Neutrophils Absolute 4 90 Thousands/µL      Lymphocytes Absolute 2 15 Thousands/µL      Monocytes Absolute 1 14 Thousand/µL      Eosinophils Absolute 0 06 Thousand/µL      Basophils Absolute 0 04 Thousands/µL     Blood culture #2 [[id number]] Collected:  11/30/17 2329    Lab Status: In process Specimen:  Blood from Arm, Left Updated:  11/30/17 1603                 XR chest 1 view portable   ED Interpretation by Laya Frances MD (12/01 0157)   Read by me; Radiologist to provide formal interpretation  No acute process      Final Result by Haroldo Schultz MD (12/01 0512)      No radiographic evidence of acute intrathoracic process  Findings concur with the preliminary report by the referring clinician already in PACS and/or our electronic record EPIC  Workstation performed: [id number]         CT head without contrast   ED Interpretation by Laya Frances MD (12/01 0144)   VRSAMI Valadez - no acute intracranial abnormality - partial opacification of left mastoid air cells      Final Result by Bárbara Pfeiffer MD (12/01 527)      No acute intracranial abnormality  Left otomastoiditis  No change           Workstation performed: [id number]                    Procedures  ECG 12 Lead Documentation  Date/Time: 12/1/2017 12:09 AM  Performed by: Karo Pandya  Authorized by: Karo Pandya     Indications / Diagnosis:  Sz  ECG reviewed by me, the ED Provider: yes    Rate:     ECG rate:  101    ECG rate assessment: tachycardic    Rhythm:     Rhythm: sinus rhythm    QRS:     QRS axis:  Normal  Comments:      No acute ischemic changes           Phone Contacts  ED Phone Contact    ED Course  ED Course as of Dec 01 1559   Fri Dec 01, 2017   0001 Ammonia level ordered but unable to obtain as machine is down will require  to [facility]; patient maybe transferred will cancel for now    0156 Additional seizure; will maximize depakote with IV load will consult urology    0210 Case reviewed with Dr Hannah Goodrich neurology on call recommmends transfer  OK with Depakon bolus recommends d/w epiliptologist on call to review if EMU bed indicated    0214 PACS contacted contacting epileptologist on call    [facility] called with update Dr Alpesh Luke paged times 3    0316 Dr Alpesh Luke returns page does not recommend EMU bed as typically has sz at baseline  Recommends admit to IM at [facility] to faciliate eval by epileptologists; no further medication recommendations    0327 Dr Kandi Mcgraw accepts patient for transfer to [facility] ED    0425 Case reviewed with Dr Qiu Query full admit med/surg tele                                MDM  Number of Diagnoses or Management Options  Diagnosis management comments: Mdm: recent hosp for somulence now with recurrently sz; will eval for electrolyte disturbance, infection and confer with neurology       CritCare Time    Disposition  Final diagnoses:   Recurrent seizures ([facility] [address] ) - h/o seizure disorder     Time reflects when diagnosis was documented in both MDM as applicable and the Disposition within this note     Time User Action Codes Description Comment    12/1/2017  3:42 AM Susan Carr Add [G40 909] Recurrent seizures ([facility] [address] )     12/1/2017  3:42 AM Susan Carr Modify [G40 909] Recurrent seizures [facility]) h/o seizure disorder      ED Disposition     ED Disposition Condition Comment    Transfer to Another [address] [place] [address] should be transferred out to [facility]  Dr Kandi Mcgraw accepts patient in transfer      MD Documentation    Gretchen Block Most Recent Value   Patient Condition  The patient has been stabilized such that within reasonable medical probability, no material deterioration of the patient condition or the condition of the unborn child(aki) is likely to result from the transfer   Reason for Transfer  Level of Care needed not available at this facility   Benefits of Transfer  Specialized equipment and/or services available at the receiving facility (Include comment)________________________ [neurology]   Risks of Transfer  Potential for delay in receiving treatment   Accepting Physician  Dr Jacqueline Gandhi Name, [place], [place]    (Name & Tel number)  PACS   Sending MD  [facility]   Provider Certification  General risk, such as traffic hazards, adverse weather conditions, rough terrain or turbulence, possible failure of equipment (including vehicle or aircraft), or consequences of actions of persons outside the control of the transport personnel      RN Documentation    Flowsheet Row Most [facility] Name, [place], [place]    (Name & Tel number)  PACS      Follow-up Information    None       Discharge Medication List as of 12/1/2017  4:42 AM      CONTINUE these medications which have NOT CHANGED    Details   cloBAZam (ONFI) tablet 20 mg by Per G Tube route daily at bedtime, Historical Med      lactulose 20 g/30 mL 35 g by Per G Tube route 3 (three) times a day, Historical Med      levETIRAcetam (KEPPRA) 100 mg/mL oral solution 10 mL by Per G Tube route every 12 (twelve) hours for 30 days, Starting Thu 10/26/2017, Until Fri 12/1/2017, Print      levOCARNitine (CARNITOR) 1 g/10 mL solution 5 mL by Per G Tube route 3 (three) times a day for 30 days, Starting Thu 10/26/2017, Until Fri 12/1/2017, Print      melatonin 3 mg Take 2 tablets by mouth daily at bedtime for 30 days, Starting Mon 11/27/2017, Until Wed 12/27/2017, Print      Multiple Vitamins-Minerals (CENTRUM SILVER PO) 1 tablet by Per G Tube route daily, Until Discontinued, Historical Med      Perampanel 8 MG TABS 1 tablet by Per G Tube route daily, Historical Med      potassium chloride 10 % 40 mEq by Per G Tube route daily, Historical Med Probiotic Product (ALEXANDER-BID PROBIOTIC PO) 1 tablet by Per G Tube route daily  , Historical Med      Rufinamide (BANZEL PO) 1,600 mg by Per G Tube route 2 (two) times a day  , Until Discontinued, Historical Med      Sennosides-Docusate Sodium (SENEXON-S PO) 1 tablet by Per G Tube route daily  , Until Discontinued, Historical Med      TOPIRAMATE  mg by Per G Tube route 2 (two) times a day, Historical Med      Valproate Sodium (VALPROIC ACID) 250 MG/5ML SOLN 5 mL by Per G Tube route every 8 (eight) hours  , Until Discontinued, Historical Med      White Petrolatum-Mineral Oil (SYSTANE NIGHTTIME) OINT Apply 1 application to eye 3 (three) times a day Both eyes , Until Discontinued, Historical Med           No discharge procedures on file      ED Provider  Electronically Signed by           Lily Corrales MD  12/01/17 1600

## 2017-12-01 NOTE — PROGRESS NOTES
Accept Note - 22 Adi Bull 40 y o  female MRN: [id number]  Unit/Bed#: MICU 06 Encounter: [id number]     Reason for Admission / Chief Complaint: Seizure activity and possible aspiration pneumonia vs pneumonitis  History of Present Illness:  Richie Tabor is a 40 y o  female with past medical history significant for  Lennox-Gastaut syndrome, intellectual disability, recurrent/intractable seizures, anoxic brain injury, elevated serum ammonia levels  Patient is currently on 6 different anti seizure medications  The patient was recently seen and [facility] because of increasing somnolence which may be related to her current anti epileptic regimen  While the patient was most recently admitted the patient was evaluated by Neurology with a video EEG numerous changes were made to her current medication regimen  On video EEG was demonstrated the patient had greater than 62 seizures in less than 5 hours  The patient's medical course has been difficult to manage as the neurologist of attempted to balance her somnolence with her recurrent and recalcitrant seizure activity  During her last admission it was determined that they would change her on fiber regimen for 10 mg each morning and 20 mg at night to solely 20 mg each evening  The patient tolerated this well and stable to be discharged home safely on this new regimen  The patient had been doing well until approximately 1 day ago when she began to have episodes of upper and I have movement with twitching and posturing  These episodes continued till the patient presented emergency department at [facility]  She had 2 mg of Ativan and then was subsequently given 5 mg of midazolam   The case was discussed with Neurology at which point it was determined that the patient be transferred to [facility] for further evaluation  Patient had a CT scan of the head which not show any acute abnormalities    While in the emergency department was knows that if her breathing was rhonchorous nurse evidence of aspiration  She had chest x-ray which demonstrated a new right middle lobe infiltrate  History obtained from chart review  Past Medical History:  Past Medical History:   Diagnosis Date    ADHD     [facility] ([facility] [address] [facility]     Autistic disorder     [facility]     Hyperammonemia (HCC)     Hyperkeratosis     Hypotension     Hypotension     Intellectual disability     Intellectual disability     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with tonic seizures (HCC)     Lethargy     Liver enzyme elevation     Onychomycosis     Osteoporosis     Osteoporosis     Psychiatric disorder     anxiety    Seizures (HCC)         Past Surgical History:  Past Surgical History:   Procedure Laterality Date    CARDIAC PACEMAKER PLACEMENT      JEJUNOSTOMY FEEDING TUBE      PEG TUBE PLACEMENT          Past Family History:  History reviewed  No pertinent family history       Social History:  History   Smoking Status    Never Smoker   Smokeless Tobacco    Never Used     History   Alcohol Use No     History   Drug Use No     Marital Status: Single    Medications:  Current Facility-Administered Medications   Medication Dose Route Frequency    [MAR Hold] acetaminophen (TYLENOL) oral suspension 650 mg  650 mg Per G Tube Q4H PRN    artificial tear (LUBRIFRESH P M ) ophthalmic ointment 1 application  1 application Ophthalmic TID    [MAR Hold] cefepime (MAXIPIME) IVPB (premix) 2,000 mg  2,000 mg Intravenous Q8H    cloBAZam (ONFI) tablet 20 mg  20 mg Per G Tube HS    dextrose 5 % and sodium chloride 0 45 % with KCl 20 mEq/L infusion  75 mL/hr Intravenous Continuous    [MAR Hold] enoxaparin (LOVENOX) subcutaneous injection 40 mg  40 mg Subcutaneous Daily    [MAR Hold] lactulose 20 g/30 mL oral solution 35 g  35 g Per G Tube TID    levETIRAcetam (KEPPRA) oral solution 1,000 mg  1,000 mg Per G Tube Q12H [address]    levOCARNitine (CARNITOR) oral solution 500 mg  500 mg Per G Tube TID    [MAR Hold] LORazepam (ATIVAN) 2 mg/mL injection 1 mg  1 mg Intravenous Q4H PRN    melatonin tablet 6 mg  6 mg Per G Tube HS    [MAR Hold] metroNIDAZOLE (FLAGYL) IVPB (premix) 500 mg  500 mg Intravenous Q8H    multivitamin with iron-minerals liquid 15 mL  15 mL Per G Tube Daily    [MAR Hold] ondansetron (ZOFRAN) injection 4 mg  4 mg Intravenous Q6H PRN    [MAR Hold] ondansetron (ZOFRAN) injection 4 mg  4 mg Intravenous Q6H PRN    Perampanel tablet 8 mg  8 mg Per G Tube Daily    potassium chloride 10 % oral solution 40 mEq  40 mEq Per G Tube Daily    rufinamide (BANZEL) tablet 1,600 mg  1,600 mg Per G Tube BID    saccharomyces boulardii (FLORASTOR) capsule 250 mg  250 mg Per G Tube BID    senna-docusate sodium (SENOKOT S) 8 6-50 mg per tablet 1 tablet  1 tablet Per G Tube Daily    topiramate (TOPAMAX) tablet 250 mg  250 mg Per G Tube BID    Valproic Acid (DEPAKENE) 250 MG/5ML oral soln 250 mg  250 mg Per G Tube Q8H    [MAR Hold] vancomycin (VANCOCIN) IVPB (premix) 750 mg  15 mg/kg Intravenous Q8H     Home medications:  Prior to Admission medications    Medication Sig Start Date End Date Taking?  Authorizing Provider   cloBAZam (ONFI) tablet 20 mg by Per G Tube route daily at bedtime   Yes Historical Provider, MD   lactulose 20 g/30 mL 35 g by Per G Tube route 3 (three) times a day   Yes Historical Provider, MD   levETIRAcetam (KEPPRA) 100 mg/mL oral solution 10 mL by Per G Tube route every 12 (twelve) hours for 30 days 10/26/17 12/1/17 Yes Burgess Christian MD   levOCARNitine (CARNITOR) 1 g/10 mL solution 5 mL by Per G Tube route 3 (three) times a day for 30 days 10/26/17 12/1/17 Yes Burgess Christian MD   melatonin 3 mg Take 2 tablets by mouth daily at bedtime for 30 days  Patient taking differently: 6 mg by Per G Tube route daily at bedtime   11/27/17 12/27/17 Yes Margoth Jhaveri, DO   Multiple Vitamins-Minerals (CENTRUM SILVER PO) 1 tablet by Per G Tube route daily   Yes Historical Provider, MD   Perampanel 8 MG TABS 1 tablet by Per G Tube route daily   Yes Historical Provider, MD   potassium chloride 10 % 40 mEq by Per G Tube route daily   Yes Historical Provider, MD   Probiotic Product (ALEXANDER-BID PROBIOTIC PO) 1 tablet by Per G Tube route daily     Yes Historical Provider, MD   Rufinamide (BANZEL PO) 1,600 mg by Per G Tube route 2 (two) times a day     Yes Historical Provider, MD   Sennosides-Docusate Sodium (SENEXON-S PO) 1 tablet by Per G Tube route daily     Yes Historical Provider, MD   TOPIRAMATE  mg by Per G Tube route 2 (two) times a day   Yes Historical Provider, MD   Valproate Sodium (VALPROIC ACID) 250 MG/5ML SOLN 5 mL by Per G Tube route every 8 (eight) hours     Yes Historical Provider, MD   White Petrolatum-Mineral Oil (SYSTANE NIGHTTIME) OINT Apply 1 application to eye 3 (three) times a day Both eyes    Yes Historical Provider, MD   acetaminophen (TYLENOL) 325 mg tablet 650 mg by Per G Tube route every 6 (six) hours as needed for mild pain or fever    12/1/17  Historical Provider, MD   bisacodyl (BISAC-EVAC) 10 mg suppository Insert 10 mg into the rectum as needed for constipation Indications: if no BM on day 4   12/1/17  Craig Hightower MD   lactulose 20 g/30 mL Take 45 mL by mouth 3 (three) times a day 11/27/17 12/1/17  Romana Cure, DO   ondansetron (ZOFRAN-ODT) 4 mg disintegrating tablet Take 1 tablet by mouth every 8 (eight) hours as needed for nausea or vomiting 6/9/17 12/1/17  Sonya Sousa MD   Perampanel 4 MG tablet Take 2 tablets by mouth daily for 30 days 10/27/17 12/1/17  Jax Cho MD   sodium phosphate (FLEET) enema Insert 1 enema into the rectum as needed Indications: if no BM on day 5  follow package directions  12/1/17  Historical Provider, MD   topiramate (TOPAMAX) 200 MG tablet 250 mg by Per G Tube route 2 (two) times a day    12/1/17  Historical Provider, MD     Allergies:   Allergies   Allergen Reactions    Felbamate         ROS: Review of Systems   Unable to perform ROS: Mental status change        Vitals:  Vitals:    17 0900 17 0941 17 1018 17 1100   BP:    114/75   Pulse:    (!) 112   Resp:    22   Temp:    100 5 °F (38 1 °C)   TempSrc:    Rectal   SpO2: 92% 99% 94% 98%   Weight:       Height:         Temperature:   Temp (24hrs), Av 2 °F (37 9 °C), Min:99 1 °F (37 3 °C), Max:100 7 °F (38 2 °C)    Current: Temperature: 100 5 °F (38 1 °C)     Weights:   IBW: 50 1 kg  Body mass index is 18 31 kg/m²  Hemodynamic Monitoring:  PAP:  , PAP mean:        Non-Invasive/Invasive Ventilation Settings:  Respiratory    Lab Data (Last 4 hours)       0937            pH, Arterial       (!)7 333           pCO2, Arterial       36 1           pO2, Arterial       (!)151 0           HCO3, Arterial       (!)18 7           Base Excess, Arterial       -6 4                O2/Vent Data     None              Lab Results   Component Value Date    PHART 7 333 (L) 2017    KZF2PWH 36 1 2017    PO2ART 151 0 (H) 2017    SAA5PPB 18 7 (L) 2017    BEART -6 4 2017    SOURCE Radial, Right 2017     SpO2: SpO2: 96 %, SpO2 Activity: SpO2 Activity: At Rest, SpO2 Device: O2 Device: Nasal cannula, Capnography:       Physical Exam:  Physical Exam   Constitutional: No distress  Appears younger than stated age, somnolent   HENT:   Head: Normocephalic and atraumatic  Mouth/Throat: Oropharynx is clear and moist    Eyes: Conjunctivae are normal  Pupils are equal, round, and reactive to light  No scleral icterus  Cardiovascular: Normal rate, regular rhythm and normal heart sounds  No murmur heard  Pulmonary/Chest:   Course breath sounds most on right lower lobe, stridorous upper respiratory breath sounds   Abdominal: Soft  She exhibits no distension  There is no tenderness  There is no guarding     Peg to left upper quadrant with clean dressing   Musculoskeletal: She exhibits no edema, tenderness or deformity  Neurological:   Somnolent, does not follow commands   Skin: Skin is warm and dry  No rash noted  She is not diaphoretic  Labs:    Results from last 7 days  Lab Units 11/30/17  2329   WBC Thousand/uL 8 29   HEMOGLOBIN g/dL 13 3   HEMATOCRIT % 40 6   PLATELETS Thousands/uL 205   NEUTROS PCT % 58   MONOS PCT % 14*      Results from last 7 days  Lab Units 11/30/17  2329 11/27/17  0504 11/25/17  0607   SODIUM mmol/L 142 141 142   POTASSIUM mmol/L 3 7 3 6 3 8   CHLORIDE mmol/L 107 107 108   CO2 mmol/L 23 26 28   BUN mg/dL 15 14 11   CREATININE mg/dL 0 54* 0 39* 0 40*   CALCIUM mg/dL 9 1 8 9 8 4   TOTAL PROTEIN g/dL 7 9  --   --    BILIRUBIN TOTAL mg/dL 0 20  --   --    ALK PHOS U/L 95  --   --    ALT U/L 40  --   --    AST U/L 27  --   --    GLUCOSE RANDOM mg/dL 143* 143* 170*       Results from last 7 days  Lab Units 11/30/17  2329   MAGNESIUM mg/dL 2 1       Results from last 7 days  Lab Units 11/30/17  2329   PHOSPHORUS mg/dL 4 0        Results from last 7 days  Lab Units 11/30/17  2329   INR  1 10   PTT seconds 29       Results from last 7 days  Lab Units 11/30/17  2329   LACTIC ACID mmol/L 1 8       0  Lab Value Date/Time   TROPONINI 0 03 11/30/2017 2329   TROPONINI <0 02 10/10/2017 0423   TROPONINI <0 02 07/05/2017 2354   TROPONINI <0 02 02/22/2017 0044        Imaging:  I have personally reviewed pertinent reports  Micro:  Lab Results   Component Value Date    BLOODCX No Growth After 5 Days  11/22/2017    BLOODCX No Growth After 5 Days  11/22/2017    BLOODCX No Growth After 5 Days  10/10/2017    BLOODCX No Growth After 5 Days  10/10/2017       Assessment/Plan:    Acute respiratory failure with hypoxemia, likely secondary to aspiration  Multifactorial, seizure disorder, receiving antiseizure medicine, and possible aspiration   O2 stat 92% on 6 L of O2 via NC, currently on continuous pulse oximetry monitoring    Lungs with rhonchi and rales  Chest x-ray demonstrates new right middle lobe infiltrate  Patient has been afebrile, but with temperatures up to 100 5  Patient has been tachycardic with rates as high as 128, but her heart rate has returned to normal less than 100        Seizure,  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She is currently on 6 different seizure medications as well as a vagal nerve stimulator and her seizures are very difficult to control  She did receive Valproate 1000mg IV x1 dose while at [facility]  Valproic acid level, 71  Keppra level, ordered  CT head, no acute intracranial abnormality  Neurology following  Continue telemetry   NPO  Place on 1:1 for continuous behavioral observation, patient has a history of attempting to remove lines wall agitated      History of hyperammonemia,  Ammonia level 79  Continue lactulose 35 g t i d      Fever,  Tmax under 100 5  Possible aspiration   Ordered cefepimem 2g IV  X-ray findings of the above  Ordered UA, straight cath  Blood cultures, from 11/30 and 12 /1 are currently still pending  MRSA positive             Plan:               Neuro:  Continue all home anti epileptic medications at this time  Ativan p r n  for breakthrough seizure activity  Epileptologist the move will place the patient on EMU  Frequent neuro checks per unit protocol  CAM ICU to monitor for delirium  Patient will be placed on continuous one-to-one monitoring for the patient's safety  Patient currently likely postictal                  CV:  Patient's heart rates appear better controlled now  Continue telemetry monitoring for evaluation of arrhythmias  Lung:  Possible aspiration pneumonia  Patient to be placed on NPO status pending formal speech and swallow by speech  Await their input before progressing diet  GI:  Patient to be NPO until can be evaluated by speech  In the interim we will continue to use the patient's PEG tube as permissible  Patient currently has elevated ammonia level of 79  We will continue to trend this level  FEN:  Currently NPO  Not currently receiving any fluids  :  Continue to monitor ins and outs  ID:  Chest x-ray demonstrating right middle lobe consolidation consistent with aspiration pneumonia versus pneumonitis  Patient has been afebrile with a T-max of 100 5°  Patient has also been receiving Tylenol also does equivocal value  Patient does not have a leukocytosis  White blood cell count is currently 8 29  Currently holding cefepime and vanc  Will continue to closely monitor to determine whether not this is pneumonia versus pneumonitis and at that point make a decision whether not patient needs to be continued on antibiotics  Heme: hemodynamically stable this time  We will continue to monitor closely  Endo:  Stable                 Msk/Skin:  Frequent repositioning per unit protocol, standard skin care precautions  Disposition:  Continue current level of ICU care at this time  Patient will need continuous observation  Patient will also need EMU monitoring by epileptologist a to determine whether not the patient current anti epileptic regimen is appropriate  Patient also need close observation for recurrent and/or recalcitrant seizure activity with appropriate benzodiazepine medication as needed  VTE Pharmacologic Prophylaxis: Enoxaparin (Lovenox)  VTE Mechanical Prophylaxis: sequential compression device     Invasive lines and devices: Invasive Devices     Peripheral Intravenous Line            Peripheral IV 11/30/17 Left Arm 1 day    Peripheral IV 12/01/17 Right Forearm less than 1 day          Drain            Gastrostomy/Enterostomy Gastrostomy 18 Fr   days          Nasogastric/Orogastric Tube            Feeding Tube 303 days                 Code Status: Level 1 - Full Code  POA:    POLST:       Given critical illness, patient length of stay will require greater than two midnights       Counseling / Coordination of Care  Total Critical Care time spent 45 minutes excluding procedures, teaching and family updates  Portions of the record may have been created with voice recognition software  Occasional wrong word or "sound a like" substitutions may have occurred due to the inherent limitations of voice recognition software  Read the chart carefully and recognize, using context, where substitutions have occurred          Donrosieee Ormaurice, DO

## 2017-12-01 NOTE — ED NOTES
Dr Kandi Mcgraw at the bedside for patient evaluation at this time     Joni Carmona, ZEE  12/01/17 8604

## 2017-12-01 NOTE — SOCIAL WORK
CM reviewed chart  Pt readmitted from [facility] where pt resides  As per medical record:  pt receives total care; pt requires a chelita lift for tranfers--pt is bed confined  Pt requires 1:1 staffing 24/7-- [place] will need 24 hour notice prior to dc to have staff in place  Pt uses [facility] transport for when discharged  [facility] referral sent to [facility]  Pt is a MA bed hold  CM spoke with Sarina Diana (pts mother) [phone]--[facility] is to return to [place] at d/c  ECIN referral made  CM reviewed d/c planning process including the following: identifying help at home, patient preference for d/c planning needs, Discharge Lounge, [facility] Meds to Bed program, availability of treatment team to discuss questions or concerns patient and/or family may have regarding understanding medications and recognizing signs and symptoms once discharged  CM also encouraged patient to follow up with all recommended appointments after discharge  Patient advised of importance for patient and family to participate in managing patients medical well being

## 2017-12-01 NOTE — PROGRESS NOTES
Pt has had 3 witness [facility]  Pt aspirated oral secretions during atleast one event  Pt rhonchorus with occasional inspiratory wheezing  Pt on 2l nc sats 90% after events, up to 99 now  Dr Tien Mckenna and richard with [facility] at bedside

## 2017-12-01 NOTE — H&P
History and Physical - [facility]    Patient Information: Fatmata Caba 40 y o  female MRN: [id number]  Unit/Bed#: ED 05 Encounter: [id number]  Admitting Physician: Richard Marquez MD  PCP: Sarath Pham DO  Date of Admission:  12/01/17  [facility] Problem List:     Principal Problem:    Seizure [facility])  Active Problems:    Hyperammonemia ([facility] )    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[facility] )    Intellectual disability      Plan for the Primary Problem(s):  · Admission, telemetry  · Seizure episodes in a patient with hard to control seizure disorder  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Patient is already on 6 kinds of anti seizure medications and that will be currently continued  Ativan for any intermittent seizure activity  Case has been discussed with Neurology while patient in [facility] and their thought is that she needs to be seen by Neurology otherwise no need for new video EEG  Will continue telemetry monitoring for arrhythmias  · History of hyperammonemia  Ammonia level has not yet been determined due to the lack of working laboratory machine in [address] [facility]  Hence, will determine on next blood draw  · Continuous behavioral observation  Patient is currently status post lorazepam and midazolam administration  Likewise, patient received Keppra 1 g intravenous x1 dose  Patient seems to be postictal at this point  However when patient is awake may be unable to follow instructions and remote self-care  Will place continuous behavioral observations  · Noted patient with mild fever possibly this could be secondary to post seizure activity  Patient has no white count  Will still get urinalysis  Blood cultures were taken x2 in [facility]  Plan for Additional Problem(s):   · Dysphagia in a patient with intellectual disability and underweight  Will continue with feeds if possible    However, if patient has more frequent seizure activity, it may not be a good idea to feed patient to prevent aspiration  Start IV fluids  VTE Prophylaxis: Enoxaparin (Lovenox)  / sequential compression device   Code Status: Prior full  POLST: There is no POLST form on file for this patient (pre-hospital)    Anticipated Length of Stay:  Patient will be admitted on an No patient class for patient encounter basis with an anticipated length of stay of  greater than 2 midnights  Justification for Hospital Stay: Please see detailed plans noted above  Chief Complaint:     Seizure activity  of Present Illness:  Jarek Moore is a 40 y o  female who has a past medical history of intellectual disability with 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and frequent seizures  Noted is that this patient has 6 different antiseizure medications  The patient was recently in [facility] because of somnolence and it may be related to her multiple seizure medications  Patient stayed in [facility] for about 5 days and the morning dose of Onfi was put on hold and video EEG was done that showed seizure activity  However neurology did not change any of her medications as she is already on 6 with a vagus nerve stimulator  The patient was doing quite well yesterday until approximately in the afternoon when during snack time, she then began to have episodes of upward eye rolling with twitching and posturing  Likewise, she presented with the same while in the emergency room of [address] [place] [address]  In any case she already had Ativan 2 mg which was followed by midazolam 5 mg  The case was then discussed with Neurology within thought that the patient needs to be transferred to [facility] for further monitoring  Also, she is transferred here for neurology consult which is not available in [address] [place] [address] at present  Also, the patient had a CT scan of the head which did not show anything acute  Will urinalysis is still pending    Ammonia was to be determined however there was no functioning ammonia level measuring machine in [facility]  [employer]  Currently, patient is asleep and unarousable possibly as effect of medications given above  In this case, the patient remains NPO although medications are administered by G-tube  Also will place patient on intravenous fluids  Review of Systems:  No review of systems available at this time  Constitutional:  Denies fever or chills   Eyes:  Denies change in visual acuity   HENT:  Denies nasal congestion or sore throat   Respiratory:  Denies cough or shortness of breath   Cardiovascular:  Denies chest pain or edema   GI:  Denies abdominal pain, nausea, vomiting, bloody stools or diarrhea   :  Denies dysuria   Musculoskeletal:  Denies back pain or joint pain   Integument:  Denies rash   Neurologic:  Denies headache, focal weakness or sensory changes   Endocrine:  Denies polyuria or polydipsia   Lymphatic:  Denies swollen glands   Psychiatric:  Denies depression or anxiety     Past Medical and Surgical History:   Past Medical History:   Diagnosis Date    ADHD     Anoxic brain damage ([facility] )     Autistic disorder     Epilepsy ([facility] )     Hyperammonemia (HCC)     Hyperkeratosis     Hypotension     Hypotension     Intellectual disability     Intellectual disability     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with tonic seizures (HCC)     Lethargy     Liver enzyme elevation     Onychomycosis     Osteoporosis     Osteoporosis     Psychiatric disorder     anxiety    Seizures (HCC)      Past Surgical History:   Procedure Laterality Date    CARDIAC PACEMAKER PLACEMENT      JEJUNOSTOMY FEEDING TUBE      PEG TUBE PLACEMENT         Meds/Allergies:  No prescriptions prior to admission  Allergies:    Allergies   Allergen Reactions    Felbamate      History:  Marital Status: Single   Occupation: unemployed  Patient Pre-hospital Living Situation: at [facility]  Patient Pre-hospital Level of Mobility: dependent  Patient Pre-hospital Diet Restrictions: G tube  Substance Use History: History   Alcohol Use No     History   Smoking Status    Never Smoker   Smokeless Tobacco    Never Used     History   Drug Use No       Family History:  No family history on file  Physical Exam:     Vitals:  100 2° F, heart rate 123, respiratory rate 24, blood pressure 127/74  Constitutional:  Well developed, well nourished, no acute distress, non-toxic appearance   Eyes:  PERRL, conjunctiva normal   HENT:  Atraumatic, external ears normal, nose normal, oropharynx moist, no pharyngeal exudates  Neck- normal range of motion, no tenderness, supple   Respiratory:  No respiratory distress, bronchial breath sounds, no rales, no wheezing   Cardiovascular:  Tachycardia, normal rhythm, no murmurs, no gallops, no rubs   GI:  Soft, nondistended, normal bowel sounds, nontender, no organomegaly, no mass, no rebound, no guarding   :  No costovertebral angle tenderness   Musculoskeletal:  No edema, no tenderness, no deformities  Back- no tenderness  Integument:  Well hydrated, no rash   Lymphatic:  No lymphadenopathy noted   Neurologic:  Asleep, does not follow commands  Psychiatric:  Under able to be examined      Lab Results: I have personally reviewed pertinent reports  Results from last 7 days  Lab Units 11/30/17  2329   WBC Thousand/uL 8 29   HEMOGLOBIN g/dL 13 3   HEMATOCRIT % 40 6   PLATELETS Thousands/uL 205   NEUTROS PCT % 58   LYMPHS PCT % 26   MONOS PCT % 14*   EOS PCT % 1       Results from last 7 days  Lab Units 11/30/17  2329   SODIUM mmol/L 142   POTASSIUM mmol/L 3 7   CHLORIDE mmol/L 107   CO2 mmol/L 23   BUN mg/dL 15   CREATININE mg/dL 0 54*   CALCIUM mg/dL 9 1   TOTAL PROTEIN g/dL 7 9   BILIRUBIN TOTAL mg/dL 0 20   ALK PHOS U/L 95   ALT U/L 40   AST U/L 27   GLUCOSE RANDOM mg/dL 143*       Results from last 7 days  Lab Units 11/30/17  2329   INR  1 10       EKG: sinus rhythm, no st changes    Imaging: I have personally reviewed pertinent reports        Xr Chest 1 View Portable    Result Date: 11/22/2017  Narrative: CHEST INDICATION:  Wheezing  Patient poor historian  COMPARISON:  Several prior studies the most recent being October 10, 2017  VIEWS:   AP frontal IMAGES:  1 FINDINGS:  Lungs are adequately aerated  Improving mild diffuse peribronchial thickening since prior exam  No consolidation or effusion  The heart and pulmonary vasculature are normal  A neural stimulator is again seen in the soft tissues of the left upper chest with wires extending to the soft tissues in the left neck  Impression: 1  Improving peribronchial thickening since prior exam   Consider mild residual bronchial inflammation  2   Otherwise no active disease seen in the chest   Workstation performed: [id number]     Xr Abdomen 1 View Kub    Result Date: 11/18/2017  Narrative: ABDOMEN INDICATION:  56-year-old female, feeding tube localization COMPARISON:  7/14/2017 x-rays VIEWS:  AP supine IMAGES:  1 FINDINGS: 50 mL of Omnipaque 240 administered per gastrostomy tube Typical gastric opacification  No evidence of leakage  There is a nonobstructive bowel gas pattern  No discernible free air on this supine study  Upright or left lateral decubitus imaging is more sensitive to detect subtle free air in the appropriate setting  No pathologic calcifications or soft tissue masses  Visualized lung bases are clear  Visualized osseous structures are unremarkable for the patient's age  Impression: Gastrostomy tube terminating at the lower body of the stomach  No evidence of leakage  Workstation performed: [id number]     Ct Head Wo Contrast    Result Date: 11/22/2017  Narrative: CT BRAIN - WITHOUT CONTRAST INDICATION:  Altered mental status (acute encephalopathy)  COMPARISON:  February 22, 2017 TECHNIQUE:  CT examination of the brain was performed  In addition to axial images, coronal reformatted images were created and submitted for interpretation  Radiation dose length product (DLP) for this visit:  990 3 mGy-cm     This examination, like all CT scans performed in the [facility], was performed utilizing techniques to minimize radiation dose exposure, including the use of iterative reconstruction and automated exposure control  IMAGE QUALITY:  Diagnostic  FINDINGS:  PARENCHYMA:  No intracranial mass, mass effect or midline shift  No CT signs of acute infarction  There is no parenchymal hemorrhage  VENTRICLES AND EXTRA-AXIAL SPACES:  Normal for patient's age  VISUALIZED ORBITS AND PARANASAL SINUSES:  Unremarkable  CALVARIUM AND EXTRACRANIAL SOFT TISSUES:  Calvarium intact  Fluid opacification of left mastoid air cells  Fluid opacification of left middle ear  Impression: No acute intracranial abnormality  Left mastoid and left middle ear effusion, new since there were 2/22/2017  Workstation performed: [id number]     CT BRAIN - WITHOUT CONTRAST (December 1, 2017)     INDICATION:  Seizure disorder     COMPARISON:  11/22/2017     TECHNIQUE:  CT examination of the brain was performed  In addition to axial images, coronal reformatted images were created and submitted for interpretation        Radiation dose length product (DLP) for this visit:  992 13 mGy-cm   This examination, like all CT scans performed in the [facility], was performed utilizing techniques to minimize radiation dose exposure, including the use of iterative   reconstruction and automated exposure control        IMAGE QUALITY:  Diagnostic      FINDINGS:     PARENCHYMA:  No intracranial mass, mass effect or midline shift  No CT signs of acute infarction  There is no parenchymal hemorrhage           VENTRICLES AND EXTRA-AXIAL SPACES:  Normal for patient's age      VISUALIZED ORBITS AND PARANASAL SINUSES:  Unremarkable      CALVARIUM AND EXTRACRANIAL SOFT TISSUES:  Otomastoiditis on the left  This is unchanged      IMPRESSION:     No acute intracranial abnormality  Left otomastoiditis  No change      ** Please Note: Dragon 360 Dictation voice to text software was used in the creation of this document   **

## 2017-12-01 NOTE — PROGRESS NOTES
12/01/17 1300   Plan of Care   Comments Responded to rapid response with pt, contacted family to make them aware of pt's transfer to MICU   Assessment Completed by: Unit visit

## 2017-12-01 NOTE — CONSULTS
Epilepsy Consultation H&P  Juliocesar House [id number] 80 y o  female   : 1980  MRN: [id number]     Unit/Bed#: [facility] 730-01    Encounter: [id number]     -------------------------------------------------------------------------------------------------------------------                Outpatient Neurologist:  Dr Joelyn Essex                  Primary Care Physician:  Maday Alcocer DO      Reason for consult:  Intractable epilepsy with very frequent seizures  -------------------------------------------------------------------------------------------------------------------  HPI:    Pam Rey is a 40 y o  female with Lennox Gastaut Syndrome, intractable seizures, remote anoxic brain injury, prior status epilepticus and severe intellectual disability admitted for recurrent seizures and decreased oxygen saturations  Briefly reviewing her history, she typically follows with Dr Joelyn Essex as an outpatient  She has been hospitalized multiple times recently, and has had multiple changes to her medications  She was in the hospital in October, and during that admission she had multiple changes to her seizure medications including initiation of Keppra and Fycompa, stopping of Lamotrigine, and attempted decreases in Onfi and Valproate  She previously did not tolerate decreases in valproate or topiramate due to worsen seizures  Additionally, when not getting Fycompa of for several days in the past, her seizures were worse  It is unclear how much benefit she has from her seizures from rufinamide or Keppra  She is currently on 6 medications and has a vagal nerve stimulator, and despite this has continued to have very frequent seizures  Historically, she has intractable seizures, often with multiple seizures per day  Her most recent continuous video EEG from her prior admission revealed 62 seizures over the course of 5 hours  Her seizures typically manifest as atypical absence seizures, tonic seizures, or tonic-clonic seizures    Her management has been difficulty because of balancing excessive somnolence, and her intractable seizures  Prior attempts at decreasing her medications (as noted above) have resulted in worsen seizures, without significant impact on her excessive somnolence  She was most recently in the hospital earlier this week because of excessive somnolence  During that hospitalization, she was monitor on continuous video EEG, and again was noted to have multiple seizures per day  To try to improve her somnolence, her dose of Onfi was decreased from 10 mg the morning and 20 mg at night to 20 mg nightly  She returned to her baseline, and was able to be discharged  I did speak to her nurse, Merna Chairez, at [address]  He reported that most of this week, she was at her baseline, awake and alert during the day  He is not sure of details, but it would appear that last night, while eating, she started having seizures  In prior documentation these reported as eyes rolling up and limb stiffening  She reportedly had about 7 seizures, and per the nursing home had low oxygen saturations, so EMS was called  She received 2 mg of Ativan with EMS, followed by 5 mg of midazolam   She was seen and [address] [address]  [facility], where she was given 1000 mg of Depakote (her admission note reports that she was given a load of Keppra, however reviewing her medication list, she was given a bolus of Depakote in the ED)  She continued to have seizures and is quite somnolent, so she was transferred to [place] for ongoing management  Since arriving at the hospital, she has had rhonchorous respirations, and evidence of aspiration  She did have a chest x-ray this morning, which shows a new right middle lobe infiltrate, that was not present on her chest x-ray earlier this morning    She has continued to have some seizures, but at the time of my evaluation, she had not had any recurrent seizures in about 30-45 minutes      I reviewed her prior records including prior clinic notes, ED records, and prior hospital records which are summarized and incorporated above      -------------------------------------------------------------------------------------------------------------------  Current Medications:  Current Facility-Administered Medications on File Prior to Encounter   Medication Dose Route Frequency Provider Last Rate Last Dose    [COMPLETED]  EMS REPLENISHMENT MED   Does not apply Once Joselito Hutchins MD        [COMPLETED] sodium chloride 0 9 % bolus 1,000 mL  1,000 mL Intravenous Once Joselito Hutchins MD   Stopped at 12/01/17 0132    [COMPLETED] valproate (DEPACON) 1,000 mg in sodium chloride 0 9 % 50 mL IVPB  1,000 mg Intravenous Once Joselito Hutchins MD   Stopped at 12/01/17 0235     Current Outpatient Prescriptions on File Prior to Encounter   Medication Sig Dispense Refill    cloBAZam (ONFI) tablet 20 mg by Per G Tube route daily at bedtime      lactulose 20 g/30 mL 35 g by Per G Tube route 3 (three) times a day      levETIRAcetam (KEPPRA) 100 mg/mL oral solution 10 mL by Per G Tube route every 12 (twelve) hours for 30 days 473 mL 0    levOCARNitine (CARNITOR) 1 g/10 mL solution 5 mL by Per G Tube route 3 (three) times a day for 30 days 474 mL 0    melatonin 3 mg Take 2 tablets by mouth daily at bedtime for 30 days (Patient taking differently: 6 mg by Per G Tube route daily at bedtime  ) 60 tablet 0    Multiple Vitamins-Minerals (CENTRUM SILVER PO) 1 tablet by Per G Tube route daily      Perampanel 8 MG TABS 1 tablet by Per G Tube route daily      potassium chloride 10 % 40 mEq by Per G Tube route daily      Probiotic Product (ALEXANDER-BID PROBIOTIC PO) 1 tablet by Per G Tube route daily        Rufinamide (BANZEL PO) 1,600 mg by Per G Tube route 2 (two) times a day        Sennosides-Docusate Sodium (SENEXON-S PO) 1 tablet by Per G Tube route daily        TOPIRAMATE  mg by Per G Tube route 2 (two) times a day      Valproate Sodium (VALPROIC ACID) 250 MG/5ML SOLN 5 mL by Per G Tube route every 8 (eight) hours        White Petrolatum-Mineral Oil (SYSTANE NIGHTTIME) OINT Apply 1 application to eye 3 (three) times a day Both eyes       [DISCONTINUED] acetaminophen (TYLENOL) 325 mg tablet 650 mg by Per G Tube route every 6 (six) hours as needed for mild pain or fever        [DISCONTINUED] bisacodyl (BISAC-EVAC) 10 mg suppository Insert 10 mg into the rectum as needed for constipation Indications: if no BM on day 4       [DISCONTINUED] lactulose 20 g/30 mL Take 45 mL by mouth 3 (three) times a day  0    [DISCONTINUED] ondansetron (ZOFRAN-ODT) 4 mg disintegrating tablet Take 1 tablet by mouth every 8 (eight) hours as needed for nausea or vomiting 20 tablet 0    [DISCONTINUED] Perampanel 4 MG tablet Take 2 tablets by mouth daily for 30 days 60 tablet 0    [DISCONTINUED] sodium phosphate (FLEET) enema Insert 1 enema into the rectum as needed Indications: if no BM on day 5  follow package directions      [DISCONTINUED] topiramate (TOPAMAX) 200 MG tablet 250 mg by Per G Tube route 2 (two) times a day           ------------------------------------------------------------------------------------------------------------------  Past Medical / Surgical History/Social History/Family History:    PMH, PSH, SH, FH were reviewed from chart  These were not able to be reviewed with patient due to severe intellectual disability       Past Medical History:   Diagnosis Date    ADHD     Anoxic brain damage ([facility] )     Autistic disorder     Epilepsy ([facility] )     Hyperammonemia (HCC)     Hyperkeratosis     Hypotension     Hypotension     Intellectual disability     Intellectual disability     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with tonic seizures (HCC)     Lethargy     Liver enzyme elevation     Onychomycosis     Osteoporosis     Osteoporosis     Psychiatric disorder     anxiety    Seizures (HCC)      Past Surgical History:   Procedure Laterality Date    CARDIAC PACEMAKER PLACEMENT      JEJUNOSTOMY FEEDING TUBE      PEG TUBE PLACEMENT       Social History     Social History    Marital status: Single     Spouse name: N/A    Number of children: N/A    Years of education: N/A     Occupational History    Not on file  Social History Main Topics    Smoking status: Never Smoker    Smokeless tobacco: Never Used    Alcohol use No    Drug use: No    Sexual activity: No     Other Topics Concern    Not on file     Social History Narrative    No narrative on file     Family history was not able to be obtained from patient due to severe intellectual disability  Allergies: Allergies   Allergen Reactions    Felbamate           ------------------------------------------------------------------------------------------------------------------  ROS:  A 12 system review of system was not able to be obtained due to severe intellectual disability        ------------------------------------------------------------------------------------------------------------------  VITALS:  Blood pressure 105/68, pulse (!) 110, temperature 99 1 °F (37 3 °C), temperature source Axillary, resp  rate 16, height 5' 2" (1 575 m), weight 45 4 kg (100 lb 1 4 oz), SpO2 94 %  GENERAL EXAMINATION:   In general patient thin and chronically ill appearing, lying in bed, initially asleep  She has rhonchi throughout  There is no peripheral edema or rash  NEUROLOGIC EXAMINATION:     Asleep  She does not arouse to loud voice, but does slightly arouse to noxious stimulation with nailbed pressure and sternal rub  She does not speak or follow commands  Cranial nerves: Pupils are equal round reactive to light  She does not blink to threat  She has roving eye movements in all directions and normal occulocephalics  Optic discs were not able to be visualized  Face was symmetric   Remainder of cranial nerves were not able to be assessed due to lack of cooperation  Motor Exam:  Decreased tone throughout  She localizes to pain with her right arm, but did not move her left for me today  She did not move her legs  Deep tendon reflexes: No clonus at the ankles  Sensation: responds to noxious stimulation in extremities  Coordination and Gait were not able to be assessed     ------------------------------------------------------------------------------------------------------------------  Labs:  Recent Labs      11/30/17   2329   WBC  8 29   HGB  13 3   HCT  40 6   PLT  205     Recent Labs      11/30/17   2329   NA  142   K  3 7   CL  107   CO2  23   BUN  15   CREATININE  0 54*   CALCIUM  9 1   MG  2 1   PHOS  4 0     Recent Labs      11/30/17   2329   ALKPHOS  95   ALT  40   AST  27     Recent Labs      11/30/17   2329   INR  1 10   PTT  29     Phenytoin Lvl   Date Value Ref Range Status   02/22/2017 <0 4 (L) 10 0 - 20 0 ug/mL Final     Comment:     3   02/16/2017 <0 4 (L) 10 0 - 20 0 ug/mL Final     Valproic Acid, Total   Date Value Ref Range Status   11/30/2017 71 50 - 100 ug/mL Final   11/22/2017 93 50 - 100 ug/mL Final   10/24/2017 46 (L) 50 - 100 ug/mL Final     Levetiracetam Lvl   Date Value Ref Range Status   11/22/2017 16 9 10 0 - 40 0 ug/mL Final   10/24/2017 21 4 10 0 - 40 0 ug/mL Final   10/22/2017 29 4 10 0 - 40 0 ug/mL Final     Lamotrigine Lvl   Date Value Ref Range Status   10/22/2017 None Detected 2 0 - 20 0 ug/mL Final     Comment:                                     Detection Limit = 1 0   10/11/2017 4 6 2 0 - 20 0 ug/mL Final     Comment:                                     Detection Limit = 1 0     Topiramate Lvl   Date Value Ref Range Status   11/22/2017 7 1 2 0 - 25 0 ug/mL Final     Comment:                                     Detection Limit = 1 0   10/24/2017 8 2 2 0 - 25 0 ug/mL Final     Comment:                                     Detection Limit = 1 0   10/22/2017 7 2 2 0 - 25 0 ug/mL Final     Comment: Detection Limit = 1 0     Ammonia   Date Value Ref Range Status   11/25/2017 84 (H) 11 - 35 umol/L Final     Comment:       Specimen collection should occur prior to Sulfasalazine administration due to the potential for falsely elevated results  Specimen collection should occur prior to Sulfapyridine administration due to the potential for falsely depressed results  11/23/2017 83 (H) 11 - 35 umol/L Final     Comment:       Specimen collection should occur prior to Sulfasalazine administration due to the potential for falsely elevated results  Specimen collection should occur prior to Sulfapyridine administration due to the potential for falsely depressed results  11/22/2017 75 (H) 11 - 35 umol/L Final     Comment:       Specimen collection should occur prior to Sulfapyridine administration due to the potential for falsely depressed results  -------------------------------------------------------------------------------------------------------------------  Impression and Plan:  Mireille Mckeon is a 40 y o  female with Lennox Gastaut Syndrome, intractable seizures, remote anoxic brain injury, prior status epilepticus and severe intellectual disability admitted for recurrent seizures and decreased oxygen saturations  Neurologic examination was significant for severe intellectual disability and somnolence  1)  Lennox Gastaut Syndrome with intractable and very frequent seizures  She is continuing to have seizures, but is difficult to say how different this is from her baseline  At baseline, she has multiple seizures per day  Her most recent continuous video EEG from her prior admission and right before discharge showed 62 seizures over the course of 5 hours  Unfortunately her epilepsy is challenged by very difficult to control seizures (despite 6 medications and VNS), and excessive somnolence from her medications   Her seizures are more frequent when she is asleep, making her management more difficult since the seizure medications meant to help her seizures, make her more sedated, and may actually make her seizures more frequent  It would appear that with this admission, she started to have seizures at her SNF, and subsequently has aspirated  The aspiration may partially be due to seizures, but troy likely is due to excessive sedation from multiple doses of benzodiazepines and Valproate load  At this point, I would like to hold off on any additional medication changes until we can reassess her EEG and compare if her seizures are different from when she was last on EEG (about a week ago)  Her seizures may be worse due to the decrease in her Onfi (from her prior admission) and only getting it at night  Michaeleen Fridge is typically dosed twice a day, so it is possible that the nightly only dosing may have contributed to her seizures at the nursing home, which occurred in the evening      --Continue her seizure medications unchanged today  Starting tomorrow, I will change her Onfi (clobazam) to be 5 mg in the morning and 15 mg at night  This will give her some benzodiazepine during the day, but also avoid a large morning dose that could contribute to her sedation  -- Will arrange for her to be hooked up to continuous Video EEG to better track her seizure frequency and determine response to treatment  -- She has historically seems to have benefit from Valproate, Topiramate, and Fycompa, but unclear benefit from Keppra and Rufinamide  She is on a relatively low dose of Keppra, so I will consider maximizing her Valproate and stopping Keppra to try to simplify her medications  -- I will order Valproate level for tomorrow morning  --She has very frequent seizures at baseline  Her examination this morning is consistent with prior examinations when admitted   I would avoid further escalations in her seizure management until we have more data from EEG, as there is already concerns about her ability to protect her airway with the medications she has already received      2)Hyperammonemia  This is likely due to Valproate (possibly with contributions from Topiramate)  Would continue Levocarnitine supplementation  3)Aspiration Pneumona vs Pneumonitis  Will defer management to [facility]  -------------------------------------------------------------------------------------------------------------------    Maude Skiff, MD             Date: 12/1/2017     Time: 11:16 AM  [address] [facility]      Addendum: 12/1/2017 4:02 PM   I revisited Tracy Perknis this afternoon  She is continuing to have frequent seizures  Although she does not fully awaken, she does withdrawal to noxious stimulation in her bilateral arms  Because she is having frequent seizures, I change her Clobazam to be 5 mg in the morning and 15 mg at night (including a 5 mg dose now)  I will also increase her Valproate to 350 mg three times a day (next dose due now), and will recheck level in the am     I will continue to follow her seizure frequency on EEG  Please call if any additional questions arise       Maude Skiff, MD

## 2017-12-01 NOTE — EMTALA/ACUTE CARE TRANSFER
[address] [address]  [address] [address]  Dept: [address] [place] TRANSFER CONSENT    NAME Stephen Le                                         1980                              MRN [id number]    I have been informed of my rights regarding examination, treatment, and transfer   by Dr Lonny Blum MD    Benefits: Specialized equipment and/or services available at the receiving facility (Include comment)________________________ (neurology)    Risks: Potential for delay in receiving treatment      Transfer Request   I acknowledge that my medical condition has been evaluated and explained to me by the emergency department physician or other qualified medical person and/or my attending physician who has recommended and offered to me further medical examination and treatment  I understand the Hospital's obligation with respect to the treatment and stabilization of my emergency medical condition  I nevertheless request to be transferred  I release the Hospital, the doctor, and any other persons caring for me from all responsibility or liability for any injury or ill effects that may result from my transfer and agree to accept all responsibility for the consequences of my choice to transfer, rather than receive stabilizing treatment at the Hospital  I understand that because the transfer is my request, my insurance may not provide reimbursement for the services  The Hospital will assist and direct me and my family in how to make arrangements for transfer, but the hospital is not liable for any fees charged by the transport service  In spite of this understanding, I refuse to consent to further medical examination and treatment which has been offered to me, and request transfer to  [facility] Name, [address] [place], [place]   I authorize the performance of emergency medical procedures and treatments upon me in both transit and upon arrival at the receiving facility  Additionally, I authorize the release of any and all medical records to the receiving facility and request they be transported with me, if possible  I authorize the performance of emergency medical procedures and treatments upon me in both transit and upon arrival at the receiving facility  Additionally, I authorize the release of any and all medical records to the receiving facility and request they be transported with me, if possible  I understand that the safest mode of transportation during a medical emergency is an ambulance and that the Hospital advocates the use of this mode of transport  Risks of traveling to the receiving facility by car, including absence of medical control, life sustaining equipment, such as oxygen, and medical personnel has been explained to me and I fully understand them  (THOMAS CORRECT BOX BELOW)  [  ]  I consent to the stated transfer and to be transported by ambulance/helicopter  [  ]  I consent to the stated transfer, but refuse transportation by ambulance and accept full responsibility for my transportation by car  I understand the risks of non-ambulance transfers and I exonerate the Hospital and its staff from any deterioration in my condition that results from this refusal     X___________________________________________    DATE  17  TIME________  Signature of patient or legally responsible individual signing on patient behalf           RELATIONSHIP TO PATIENT_________________________          Provider Certification    NAME Jolanta Fowler [facility] 1980                              MRN [id number]    A medical screening exam was performed on the above named patient  Based on the examination:    Condition Necessitating Transfer The encounter diagnosis was Recurrent seizures ([facility] [address] )      Patient Condition: The patient has been stabilized such that within reasonable medical probability, no material deterioration of the patient condition or the condition of the unborn child(aki) is likely to result from the transfer    Reason for Transfer: Level of Care needed not available at this facility    Transfer Requirements: [address] [place] [address]   · Space available and qualified personnel available for treatment as acknowledged by PACS  · Agreed to accept transfer and to provide appropriate medical treatment as acknowledged by       Dr Ju Bautista  · Appropriate medical records of the examination and treatment of the patient are provided at the time of transfer   [address] [facility], [address] _______  · Transfer will be performed by qualified personnel from    and appropriate transfer equipment as required, including the use of necessary and appropriate life support measures  Provider Certification: I have examined the patient and explained the following risks and benefits of being transferred/refusing transfer to the patient/family:  General risk, such as traffic hazards, adverse weather conditions, rough terrain or turbulence, possible failure of equipment (including vehicle or aircraft), or consequences of actions of persons outside the control of the transport personnel      Based on these reasonable risks and benefits to the patient and/or the unborn child(aki), and based upon the information available at the time of the patients examination, I certify that the medical benefits reasonably to be expected from the provision of appropriate medical treatments at another medical facility outweigh the increasing risks, if any, to the individuals medical condition, and in the case of labor to the unborn child, from effecting the transfer      X____________________________________________ DATE 12/01/17        TIME_______      ORIGINAL - SEND TO MEDICAL RECORDS   COPY - SEND WITH PATIENT DURING TRANSFER

## 2017-12-01 NOTE — PLAN OF CARE
Problem: DISCHARGE PLANNING - CARE MANAGEMENT  Goal: Discharge to post-acute care or home with appropriate resources  INTERVENTIONS:  - Conduct assessment to determine patient/family and health care team treatment goals, and need for post-acute services based on payer coverage, community resources, and patient preferences, and barriers to discharge  - Address psychosocial, clinical, and financial barriers to discharge as identified in assessment in conjunction with the patient/family and health care team  - Arrange appropriate level of post-acute services according to patient's   needs and preference and payer coverage in collaboration with the physician and health care team  - Communicate with and update the patient/family, physician, and health care team regarding progress on the discharge plan  - Arrange appropriate transportation to post-acute venues  - Pt to d/c to [facility] when medically stable    Outcome: Progressing

## 2017-12-01 NOTE — PROGRESS NOTES
[facility] Internal Medicine Progress Note  Patient: Geoffrey Jarvis 40 y o  female   MRN: [id number]  PCP: Carrie Guadarrama DO  Unit/Bed#: [facility] 730-01 Encounter: [id number]  Date Of Visit: 12/01/17    Assessment:    Principal Problem:    Seizure ([facility] [address] )  Active Problems:    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[facility] [address] )    Intellectual disability    Hyperammonemia ([facility] [address] )      Plan:  Acute respiratory failure with hypoxemia,  Multifactorial, seizure disorder, receiving antiseizure medicine, and possible aspiration   O2 stat 92% on 6 L of O2 via NC, ordered continuous pulse ox  Lungs with rhonchi and rales  PCXR stat  ABG, Stat  Critical care consulted    Seizure,  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She is currently on 6 different seizure medications and her seizures are very difficult to control  She did receive Valproate 1000mg IV x1 dose while at [facility]  Valproic acid level, 71  Keppra level, ordered  CT head, no acute intracranial abnormality  Neurology following  Contacted Dr Pamela Gonzalez, will order an EEG  Continue telemetry  NPO  Place on 1:1 for continuous behavioral observation    History of hyperammonemia,  Ordered stat Ammonia level  Continue lactulose 35 g t i d  Fever,  Tmax 100 2  Possible aspiration   Ordered cefepimem 2g IV  PCXR, No radiographic evidence of acute intrathoracic process  Ordered stat PCXR  Ordered UA, straight cath  Blood cultures, pending  MRSA positive    VTE Pharmacologic Prophylaxis:   Pharmacologic: Enoxaparin (Lovenox)  Mechanical VTE Prophylaxis in Place: Yes    Patient Centered Rounds: I have performed bedside rounds with nursing staff today  Discussions with Specialists or Other Care Team Provider: Spoke with Dr Carlos Banks, neurology, Dr Pamela Gonzalez and critical care     Education and Discussions with Family / Patient: Spoke with father and informed him of the plan of care    Time Spent for Care: 36  More than 50% of total time spent on counseling and coordination of care as described above      Current Length of Stay: 0 day(s)    Current Patient Status: Inpatient   Certification Statement: The patient will continue to require additional inpatient hospital stay due to Patient continues to actively seizure requiring further investigation    Discharge Plan:  Not anticipated present    Code Status: Level 1 - Full Code      Subjective:   She is observed lying in stretcher, only responding to painful stimuli  Per nurse she has had 3 episodes of seizure activity since 8 a m  Objective:     Vitals:   Temp (24hrs), Av 1 °F (37 8 °C), Min:99 1 °F (37 3 °C), Max:100 7 °F (38 2 °C)    HR:  [] 110  Resp:  [14-24] 16  BP: (105-141)/(67-91) 105/68  SpO2:  [92 %-100 %] 92 %  Body mass index is 18 31 kg/m²  Input and Output Summary (last 24 hours):     No intake or output data in the 24 hours ending 17 0926    Physical Exam:     Physical Exam   Constitutional: She appears well-developed  frail   HENT:   Head: Normocephalic and atraumatic  Neck: Normal range of motion  Neck supple  Cardiovascular: Regular rhythm and normal heart sounds  Exam reveals no gallop and no friction rub  No murmur heard  Sinus tachycardia   Pulmonary/Chest: No respiratory distress  She has no wheezes  She has rales  She exhibits no tenderness  Scattered rhonchi and rhales throughout   Abdominal: Soft  Bowel sounds are normal  She exhibits no distension and no mass  There is no tenderness  There is no rebound and no guarding  Gtube in place   Musculoskeletal: She exhibits no edema, tenderness or deformity  Neurological:   Responded only to painful stimuli   Skin: Skin is warm and dry  No rash noted  She is not diaphoretic  No erythema  No pallor         Additional Data:     Labs:      Results from last 7 days  Lab Units 17  2329   WBC Thousand/uL 8 29   HEMOGLOBIN g/dL 13 3   HEMATOCRIT % 40 6   PLATELETS Thousands/uL 205   NEUTROS PCT % 58   LYMPHS PCT % 26   MONOS PCT % 14*   EOS PCT % 1       Results from last 7 days  Lab Units 11/30/17  2329   SODIUM mmol/L 142   POTASSIUM mmol/L 3 7   CHLORIDE mmol/L 107   CO2 mmol/L 23   BUN mg/dL 15   CREATININE mg/dL 0 54*   CALCIUM mg/dL 9 1   TOTAL PROTEIN g/dL 7 9   BILIRUBIN TOTAL mg/dL 0 20   ALK PHOS U/L 95   ALT U/L 40   AST U/L 27   GLUCOSE RANDOM mg/dL 143*       Results from last 7 days  Lab Units 11/30/17  2329   INR  1 10       * I Have Reviewed All Lab Data Listed Above  * Additional Pertinent Lab Tests Reviewed: Maddie 66 Admission Reviewed    Imaging:    Imaging Reports Reviewed Today Include: CT of head, PCXR  Imaging Personally Reviewed by Myself Includes:  none    Recent Cultures (last 7 days):           Last 24 Hours Medication List:     artificial tear 1 application Ophthalmic TID   cefepime 2,000 mg Intravenous Q8H   cloBAZam 20 mg Per G Tube HS   enoxaparin 40 mg Subcutaneous Daily   lactulose 35 g Per G Tube TID   levETIRAcetam 1,000 mg Per G Tube Q12H [address]   levOCARNitine 500 mg Per G Tube TID   melatonin 6 mg Per G Tube HS   multivitamin with iron-minerals 15 mL Per G Tube Daily   Perampanel 8 mg Per G Tube Daily   potassium chloride 40 mEq Per G Tube Daily   rufinamide 1,600 mg Per G Tube BID   saccharomyces boulardii 250 mg Per G Tube BID   senna-docusate sodium 1 tablet Per G Tube Daily   topiramate 250 mg Per G Tube BID   Valproic Acid 250 mg Per G Tube Q8H        Today, Patient Was Seen By: KATHY Sherman    ** Please Note: Dragon 360 Dictation voice to text software may have been used in the creation of this document   **

## 2017-12-01 NOTE — ED NOTES
Patient actively seizing at this time with seizure lasting approx 10 seconds  Will make SLIM aware       Yamile Coleman RN  12/01/17 0600

## 2017-12-01 NOTE — RAPID RESPONSE
Progress Note - Rapid Response   Karyle Pillion 40 y o  female MRN: [id number]    Time Called ( Time): 1100  Room#: TLTP 273  YMKMRPU Time ( Time): 0976  Event End Time ( Time): 0703  Primary reason for call: Acute change in mental status  Interventions:  Airway/Breathing:  O2 Mask/Nasal  Circulation: IV Fluid Bolus  Other Treatments: N/A       Assessment:   1  Acute encephalopathy    Plan:   · Transfer to the MICU  · Monitor with VEEG  · Family informed of patient's transfer       HPI/Chief Complaint (Background/Situation):   Karyle Pillion is a 40y o  year old female who presents with PMH significant for frequent seizures and intellectual disabilities and 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Patient was examined and noted to have decreased mental status and transferred to the MICU     Historical Information   Past Medical History:   Diagnosis Date    ADHD     Anoxic brain damage ([facility] [address] )     Autistic disorder     Epilepsy ([facility] [address] )     Hyperammonemia (HCC)     Hyperkeratosis     Hypotension     Hypotension     Intellectual disability     Intellectual disability     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with tonic seizures (HCC)     Lethargy     Liver enzyme elevation     Onychomycosis     Osteoporosis     Osteoporosis     Psychiatric disorder     anxiety    Seizures (HCC)      Past Surgical History:   Procedure Laterality Date    CARDIAC PACEMAKER PLACEMENT      JEJUNOSTOMY FEEDING TUBE      PEG TUBE PLACEMENT       Social History   History   Alcohol Use No     History   Drug Use No     History   Smoking Status    Never Smoker   Smokeless Tobacco    Never Used     Family History: non-contributory    Meds/Allergies     artificial tear 1 application Ophthalmic TID   cefepime 2,000 mg Intravenous Q8H   cloBAZam 20 mg Per G Tube HS   enoxaparin 40 mg Subcutaneous Daily   lactulose 35 g Per G Tube TID   levETIRAcetam 1,000 mg Per G Tube Q12H [address]   levOCARNitine 500 mg Per G Tube TID   melatonin 6 mg Per G Tube HS   metroNIDAZOLE 500 mg Intravenous Q8H   multivitamin with iron-minerals 15 mL Per G Tube Daily   Perampanel 8 mg Per G Tube Daily   potassium chloride 40 mEq Per G Tube Daily   rufinamide 1,600 mg Per G Tube BID   saccharomyces boulardii 250 mg Per G Tube BID   senna-docusate sodium 1 tablet Per G Tube Daily   topiramate 250 mg Per G Tube BID   Valproic Acid 250 mg Per G Tube Q8H   vancomycin 15 mg/kg Intravenous Q12H         dextrose 5 % and sodium chloride 0 45 % with KCl 20 mEq/L 75 mL/hr Last Rate: 75 mL/hr (12/01/17 0850)       Allergies   Allergen Reactions    Felbamate        ROS: unable to determine secondary to mental status changes    Physical Exam:  Neuro: weakness on examination    No intake or output data in the 24 hours ending 12/01/17 1219    Respiratory    Lab Data (Last 4 hours)      12/01 0937            pH, Arterial       (!)7 333           pCO2, Arterial       36 1           pO2, Arterial       (!)151 0           HCO3, Arterial       (!)18 7           Base Excess, Arterial       -6 4                O2/Vent Data     None              Invasive Devices     Peripheral Intravenous Line            Peripheral IV 11/30/17 Left Arm 1 day    Peripheral IV 12/01/17 Right Forearm less than 1 day          Drain            Gastrostomy/Enterostomy Gastrostomy 18 Fr   days          Nasogastric/Orogastric Tube            Feeding Tube 303 days                DIAGNOSTIC DATA:    Lab: I have personally reviewed pertinent lab results     CBC:     Results from last 7 days  Lab Units 11/30/17  2329   WBC Thousand/uL 8 29   HEMOGLOBIN g/dL 13 3   HEMATOCRIT % 40 6   PLATELETS Thousands/uL 205     CMP:     Results from last 7 days  Lab Units 11/30/17  2329 11/27/17  0504 11/25/17  0607   SODIUM mmol/L 142 141 142   POTASSIUM mmol/L 3 7 3 6 3 8   CHLORIDE mmol/L 107 107 108   CO2 mmol/L 23 26 28   BUN mg/dL 15 14 11   CREATININE mg/dL 0 54* 0 39* 0 40*   CALCIUM mg/dL 9 1 8 9 8 4   TOTAL PROTEIN g/dL 7 9  --   --    BILIRUBIN TOTAL mg/dL 0 20  --   --    ALK PHOS U/L 95  --   --    ALT U/L 40  --   --    AST U/L 27  --   --    GLUCOSE RANDOM mg/dL 143* 143* 170*     PT/INR:   Lab Results   Component Value Date    INR 1 10 11/30/2017   ,   Magnesium: No components found for: MAG,   Phosphorous:   Lab Results   Component Value Date    PHOS 4 0 11/30/2017       Microbiology:  Lab Results   Component Value Date    BLOODCX No Growth After 5 Days  11/22/2017    BLOODCX No Growth After 5 Days  11/22/2017    BLOODCX No Growth After 5 Days  10/10/2017    BLOODCX No Growth After 5 Days  10/10/2017         OUTCOME:   Transferred to Critical Care Unit  Family notified of transfer: yes  Code Status: Level 1 - Full Code  Critical Care Time: Total Critical Care time spent 55 minutes excluding procedures, teaching and family updates

## 2017-12-02 ENCOUNTER — APPOINTMENT (INPATIENT)
Dept: NEUROLOGY | Facility: AMBULATORY SURGERY CENTER | Age: 37
DRG: 100 | End: 2017-12-02
Payer: MEDICARE

## 2017-12-02 ENCOUNTER — APPOINTMENT (INPATIENT)
Dept: RADIOLOGY | Facility: HOSPITAL | Age: 37
DRG: 100 | End: 2017-12-02
Payer: MEDICARE

## 2017-12-02 ENCOUNTER — GENERIC CONVERSION - ENCOUNTER (OUTPATIENT)
Dept: OTHER | Facility: OTHER | Age: 37
End: 2017-12-02

## 2017-12-02 PROBLEM — J96.00 ACUTE RESPIRATORY FAILURE (HCC): Status: ACTIVE | Noted: 2017-12-02

## 2017-12-02 LAB
ALBUMIN SERPL BCP-MCNC: 2.7 G/DL (ref 3.5–5)
ALP SERPL-CCNC: 81 U/L (ref 46–116)
ALT SERPL W P-5'-P-CCNC: 27 U/L (ref 12–78)
AMMONIA PLAS-SCNC: 48 UMOL/L (ref 11–35)
ANION GAP SERPL CALCULATED.3IONS-SCNC: 8 MMOL/L (ref 4–13)
AST SERPL W P-5'-P-CCNC: 35 U/L (ref 5–45)
BASOPHILS # BLD AUTO: 0.03 THOUSANDS/ΜL (ref 0–0.1)
BASOPHILS NFR BLD AUTO: 0 % (ref 0–1)
BILIRUB SERPL-MCNC: 0.61 MG/DL (ref 0.2–1)
BUN SERPL-MCNC: 6 MG/DL (ref 5–25)
CALCIUM SERPL-MCNC: 8.2 MG/DL (ref 8.3–10.1)
CHLORIDE SERPL-SCNC: 111 MMOL/L (ref 100–108)
CO2 SERPL-SCNC: 20 MMOL/L (ref 21–32)
CREAT SERPL-MCNC: 0.53 MG/DL (ref 0.6–1.3)
EOSINOPHIL # BLD AUTO: 0.07 THOUSAND/ΜL (ref 0–0.61)
EOSINOPHIL NFR BLD AUTO: 0 % (ref 0–6)
ERYTHROCYTE [DISTWIDTH] IN BLOOD BY AUTOMATED COUNT: 13.1 % (ref 11.6–15.1)
ERYTHROCYTE [DISTWIDTH] IN BLOOD BY AUTOMATED COUNT: 13.2 % (ref 11.6–15.1)
GFR SERPL CREATININE-BSD FRML MDRD: 123 ML/MIN/1.73SQ M
GLUCOSE SERPL-MCNC: 109 MG/DL (ref 65–140)
GLUCOSE SERPL-MCNC: 113 MG/DL (ref 65–140)
GLUCOSE SERPL-MCNC: 136 MG/DL (ref 65–140)
GLUCOSE SERPL-MCNC: 187 MG/DL (ref 65–140)
GLUCOSE SERPL-MCNC: 339 MG/DL (ref 65–140)
GLUCOSE SERPL-MCNC: 83 MG/DL (ref 65–140)
GLUCOSE SERPL-MCNC: 97 MG/DL (ref 65–140)
HCT VFR BLD AUTO: 36.4 % (ref 34.8–46.1)
HCT VFR BLD AUTO: 38.2 % (ref 34.8–46.1)
HGB BLD-MCNC: 11.7 G/DL (ref 11.5–15.4)
HGB BLD-MCNC: 12.5 G/DL (ref 11.5–15.4)
LYMPHOCYTES # BLD AUTO: 2.93 THOUSANDS/ΜL (ref 0.6–4.47)
LYMPHOCYTES NFR BLD AUTO: 14 % (ref 14–44)
MAGNESIUM SERPL-MCNC: 1.8 MG/DL (ref 1.6–2.6)
MCH RBC QN AUTO: 33.4 PG (ref 26.8–34.3)
MCH RBC QN AUTO: 33.5 PG (ref 26.8–34.3)
MCHC RBC AUTO-ENTMCNC: 32.1 G/DL (ref 31.4–37.4)
MCHC RBC AUTO-ENTMCNC: 32.7 G/DL (ref 31.4–37.4)
MCV RBC AUTO: 102 FL (ref 82–98)
MCV RBC AUTO: 104 FL (ref 82–98)
MONOCYTES # BLD AUTO: 1.67 THOUSAND/ΜL (ref 0.17–1.22)
MONOCYTES NFR BLD AUTO: 8 % (ref 4–12)
NEUTROPHILS # BLD AUTO: 16.35 THOUSANDS/ΜL (ref 1.85–7.62)
NEUTS SEG NFR BLD AUTO: 78 % (ref 43–75)
NRBC BLD AUTO-RTO: 0 /100 WBCS
PHOSPHATE SERPL-MCNC: 2.4 MG/DL (ref 2.7–4.5)
PLATELET # BLD AUTO: 189 THOUSANDS/UL (ref 149–390)
PLATELET # BLD AUTO: 200 THOUSANDS/UL (ref 149–390)
PMV BLD AUTO: 11.5 FL (ref 8.9–12.7)
PMV BLD AUTO: 12.2 FL (ref 8.9–12.7)
POTASSIUM SERPL-SCNC: 3.6 MMOL/L (ref 3.5–5.3)
PROT SERPL-MCNC: 6.8 G/DL (ref 6.4–8.2)
RBC # BLD AUTO: 3.5 MILLION/UL (ref 3.81–5.12)
RBC # BLD AUTO: 3.73 MILLION/UL (ref 3.81–5.12)
SODIUM SERPL-SCNC: 139 MMOL/L (ref 136–145)
TSH SERPL DL<=0.05 MIU/L-ACNC: 1.02 UIU/ML (ref 0.36–3.74)
VALPROATE SERPL-MCNC: 56 UG/ML (ref 50–100)
VALPROATE SERPL-MCNC: 80 UG/ML (ref 50–100)
WBC # BLD AUTO: 21.13 THOUSAND/UL (ref 4.31–10.16)
WBC # BLD AUTO: 28.58 THOUSAND/UL (ref 4.31–10.16)

## 2017-12-02 PROCEDURE — 82948 REAGENT STRIP/BLOOD GLUCOSE: CPT

## 2017-12-02 PROCEDURE — 80053 COMPREHEN METABOLIC PANEL: CPT | Performed by: INTERNAL MEDICINE

## 2017-12-02 PROCEDURE — 84100 ASSAY OF PHOSPHORUS: CPT | Performed by: INTERNAL MEDICINE

## 2017-12-02 PROCEDURE — 85027 COMPLETE CBC AUTOMATED: CPT | Performed by: INTERNAL MEDICINE

## 2017-12-02 PROCEDURE — 82140 ASSAY OF AMMONIA: CPT | Performed by: INTERNAL MEDICINE

## 2017-12-02 PROCEDURE — 80164 ASSAY DIPROPYLACETIC ACD TOT: CPT | Performed by: PSYCHIATRY & NEUROLOGY

## 2017-12-02 PROCEDURE — 71010 HB CHEST X-RAY 1 VIEW FRONTAL (PORTABLE): CPT

## 2017-12-02 PROCEDURE — 84443 ASSAY THYROID STIM HORMONE: CPT | Performed by: INTERNAL MEDICINE

## 2017-12-02 PROCEDURE — 85025 COMPLETE CBC W/AUTO DIFF WBC: CPT | Performed by: INTERNAL MEDICINE

## 2017-12-02 PROCEDURE — 83735 ASSAY OF MAGNESIUM: CPT | Performed by: INTERNAL MEDICINE

## 2017-12-02 PROCEDURE — 95951 HB EEG MONITORING/VIDEORECORD: CPT

## 2017-12-02 RX ORDER — VALPROIC ACID 250 MG/5ML
400 SOLUTION ORAL EVERY 8 HOURS
Status: DISCONTINUED | OUTPATIENT
Start: 2017-12-02 | End: 2017-12-02

## 2017-12-02 RX ORDER — LORAZEPAM 2 MG/ML
2 INJECTION INTRAMUSCULAR ONCE
Status: COMPLETED | OUTPATIENT
Start: 2017-12-02 | End: 2017-12-02

## 2017-12-02 RX ORDER — VALPROIC ACID 250 MG/5ML
500 SOLUTION ORAL EVERY 8 HOURS
Status: DISCONTINUED | OUTPATIENT
Start: 2017-12-03 | End: 2017-12-07 | Stop reason: HOSPADM

## 2017-12-02 RX ADMIN — MINERAL OIL AND WHITE PETROLATUM 1 APPLICATION: 150; 830 OINTMENT OPHTHALMIC at 20:01

## 2017-12-02 RX ADMIN — Medication 250 MG: at 08:10

## 2017-12-02 RX ADMIN — LEVETIRACETAM 1000 MG: 100 SOLUTION ORAL at 08:09

## 2017-12-02 RX ADMIN — ENOXAPARIN SODIUM 40 MG: 40 INJECTION SUBCUTANEOUS at 08:08

## 2017-12-02 RX ADMIN — MELATONIN TAB 3 MG 6 MG: 3 TAB at 22:45

## 2017-12-02 RX ADMIN — LORAZEPAM 2 MG: 2 INJECTION INTRAMUSCULAR; INTRAVENOUS at 08:10

## 2017-12-02 RX ADMIN — VALPROATE SODIUM 500 MG: 100 INJECTION, SOLUTION INTRAVENOUS at 18:36

## 2017-12-02 RX ADMIN — Medication 1 TABLET: at 08:07

## 2017-12-02 RX ADMIN — MULTIVITAMIN 15 ML: LIQUID ORAL at 11:28

## 2017-12-02 RX ADMIN — MINERAL OIL AND WHITE PETROLATUM 1 APPLICATION: 150; 830 OINTMENT OPHTHALMIC at 17:05

## 2017-12-02 RX ADMIN — VANCOMYCIN HYDROCHLORIDE 750 MG: 750 INJECTION, SOLUTION INTRAVENOUS at 15:17

## 2017-12-02 RX ADMIN — VALPROIC ACID 400 MG: 500 SOLUTION ORAL at 17:06

## 2017-12-02 RX ADMIN — CEFEPIME HYDROCHLORIDE 2000 MG: 2 INJECTION, POWDER, FOR SOLUTION INTRAVENOUS at 05:07

## 2017-12-02 RX ADMIN — MINERAL OIL AND WHITE PETROLATUM 1 APPLICATION: 150; 830 OINTMENT OPHTHALMIC at 08:10

## 2017-12-02 RX ADMIN — LACTULOSE 35 G: 20 SOLUTION ORAL at 20:01

## 2017-12-02 RX ADMIN — LEVOCARNITINE 500 MG: 1 SOLUTION ORAL at 20:01

## 2017-12-02 RX ADMIN — VALPROIC ACID 350 MG: 500 SOLUTION ORAL at 00:05

## 2017-12-02 RX ADMIN — Medication 250 MG: at 17:05

## 2017-12-02 RX ADMIN — CLOBAZAM 5 MG: 10 TABLET ORAL at 08:08

## 2017-12-02 RX ADMIN — TOPIRAMATE 250 MG: 100 TABLET, FILM COATED ORAL at 17:05

## 2017-12-02 RX ADMIN — POTASSIUM CHLORIDE 40 MEQ: 20 SOLUTION ORAL at 08:08

## 2017-12-02 RX ADMIN — METRONIDAZOLE 500 MG: 500 INJECTION, SOLUTION INTRAVENOUS at 07:01

## 2017-12-02 RX ADMIN — LACTULOSE 35 G: 20 SOLUTION ORAL at 17:05

## 2017-12-02 RX ADMIN — PERAMPANEL 8 MG: 4 TABLET ORAL at 08:23

## 2017-12-02 RX ADMIN — PIPERACILLIN SODIUM AND TAZOBACTAM SODIUM 3.38 G: 36; 4.5 INJECTION, POWDER, FOR SOLUTION INTRAVENOUS at 14:38

## 2017-12-02 RX ADMIN — LEVOCARNITINE 500 MG: 1 SOLUTION ORAL at 08:10

## 2017-12-02 RX ADMIN — VALPROIC ACID 350 MG: 500 SOLUTION ORAL at 08:08

## 2017-12-02 RX ADMIN — PIPERACILLIN SODIUM AND TAZOBACTAM SODIUM 3.38 G: 36; 4.5 INJECTION, POWDER, FOR SOLUTION INTRAVENOUS at 19:57

## 2017-12-02 RX ADMIN — LEVETIRACETAM 1000 MG: 100 SOLUTION ORAL at 20:01

## 2017-12-02 RX ADMIN — VANCOMYCIN HYDROCHLORIDE 750 MG: 750 INJECTION, SOLUTION INTRAVENOUS at 21:05

## 2017-12-02 RX ADMIN — VALPROATE SODIUM 200 MG: 100 INJECTION, SOLUTION INTRAVENOUS at 11:29

## 2017-12-02 RX ADMIN — LEVOCARNITINE 500 MG: 1 SOLUTION ORAL at 17:06

## 2017-12-02 RX ADMIN — VANCOMYCIN HYDROCHLORIDE 750 MG: 750 INJECTION, SOLUTION INTRAVENOUS at 03:58

## 2017-12-02 RX ADMIN — TOPIRAMATE 250 MG: 100 TABLET, FILM COATED ORAL at 08:11

## 2017-12-02 RX ADMIN — CLOBAZAM 15 MG: 10 TABLET ORAL at 22:44

## 2017-12-02 RX ADMIN — RUFINAMIDE 1600 MG: 200 TABLET, FILM COATED ORAL at 08:08

## 2017-12-02 RX ADMIN — DEXTROSE AND SODIUM CHLORIDE 75 ML/HR: 5; .9 INJECTION, SOLUTION INTRAVENOUS at 03:42

## 2017-12-02 RX ADMIN — LACTULOSE 35 G: 20 SOLUTION ORAL at 08:08

## 2017-12-02 RX ADMIN — RUFINAMIDE 1600 MG: 200 TABLET, FILM COATED ORAL at 17:05

## 2017-12-02 NOTE — PLAN OF CARE
Problem: Potential for Falls  Goal: Patient will remain free of falls  INTERVENTIONS:  - Assess patient frequently for physical needs  -  Identify cognitive and physical deficits and behaviors that affect risk of falls  -  Carney fall precautions as indicated by assessment   - Educate patient/family on patient safety including physical limitations  - Instruct patient to call for assistance with activity based on assessment  - Modify environment to reduce risk of injury  - Consider OT/PT consult to assist with strengthening/mobility   Outcome: Progressing      Problem: Prexisting or High Potential for Compromised Skin Integrity  Goal: Skin integrity is maintained or improved  INTERVENTIONS:  - Identify patients at risk for skin breakdown  - Assess and monitor skin integrity  - Assess and monitor nutrition and hydration status  - Monitor labs (i e  albumin)  - Assess for incontinence   - Turn and reposition patient  - Assist with mobility/ambulation  - Relieve pressure over bony prominences  - Avoid friction and shearing  - Provide appropriate hygiene as needed including keeping skin clean and dry  - Evaluate need for skin moisturizer/barrier cream  - Collaborate with interdisciplinary team (i e  Nutrition, Rehabilitation, etc )   - Patient/family teaching   Outcome: Progressing      Problem: NEUROSENSORY - ADULT  Goal: Achieves stable or improved neurological status  INTERVENTIONS  - Monitor and report changes in neurological status  - Initiate measures to prevent increased intracranial pressure  - Maintain blood pressure and fluid volume within ordered parameters to optimize cerebral perfusion  - Monitor temperature, glucose, and sodium or any other associated labs   Initiate appropriate interventions as ordered  - Monitor for seizure activity   - Administer anti-seizure medications as ordered  Outcome: Not Progressing  Pt on EMU for active seizures  Goal: Absence of seizures  INTERVENTIONS  - Monitor for seizure activity  - Administer anti-seizure medications as ordered  - Monitor neurological status  Outcome: Not Progressing    Goal: Remains free of injury related to seizures activity  INTERVENTIONS  - Maintain airway, patient safety  and administer oxygen as ordered  - Monitor patient for seizure activity, document and report duration and description of seizure to physician/advanced practitioner  - If seizure occurs,  ensure patient safety during seizure  - Reorient patient post seizure  - Seizure pads on all 4 side rails  - Instruct patient/family to notify RN of any seizure activity including if an aura is experienced  - Instruct patient/family to call for assistance with activity based on nursing assessment  - Administer anti-seizure medications as ordered  - Monitor fetal well being  Outcome: Not Progressing    Goal: Achieves maximal functionality and self care  INTERVENTIONS  - Monitor swallowing and airway patency with patient fatigue and changes in neurological status  - Encourage and assist patient to increase activity and self care with guidance from rehab services  - Encourage visually impaired, hearing impaired and aphasic patients to use assistive/communication devices  Outcome: Not Progressing

## 2017-12-02 NOTE — PROCEDURES
Continuous Video EEG Monitoring       Patient Name:  Darek Hebert  MRN: [id number]   :  1980 File #: [id number]   Age: 40 y o  Encounter #: [id number]          Report date: 2017          Study type: Continuous video EEG    ICD 10 diagnosis: Generalized epilepsy intractable with status G40 311    Start time: 2017: 14:24 End time: 2017: 07:59     -------------------------------------------------------------------------------------------------------------------   Patient History: This recording was observed in a 40 y o  female with Joni Kinds Syndrome with intractable seizures to determine frequency of seizures and guide therapy  Medications include:   artificial tear 1 application Ophthalmic TID   cloBAZam 15 mg Per G Tube HS   cloBAZam 5 mg Oral Daily   enoxaparin 40 mg Subcutaneous Daily   lactulose 35 g Per G Tube TID   levETIRAcetam 1,000 mg Per G Tube Q12H [address]   levOCARNitine 500 mg Per G Tube TID   melatonin 6 mg Per G Tube HS   multivitamin with iron-minerals 15 mL Per G Tube Daily   Perampanel 8 mg Per G Tube Daily   piperacillin-tazobactam 3 375 g Intravenous Q6H   potassium chloride 40 mEq Per G Tube Daily   rufinamide 1,600 mg Per G Tube BID   saccharomyces boulardii 250 mg Per G Tube BID   senna-docusate sodium 1 tablet Per G Tube Daily   topiramate 250 mg Per G Tube BID   Valproic Acid 400 mg Per G Tube Q8H   vancomycin 15 mg/kg Intravenous Q8H       -------------------------------------------------------------------------------------------------------------------   Description of Procedure:  · 32 channel digital recording with electrodes placed according to the International 10-20 system with additional T1/T2 electrodes, EOG, EKG, and simultaneous video  A monitoring technologist supervised the continuous recording   The recording was technically satisfactory  -------------------------------------------------------------------------------------------------------------------   Results:   Manual Review:   No age or state appropriate activities were seen  Instead, background activities consisted of reactive, generalized, poorly organized, predominantly theta and delta activities with mixed overriding faster activities  There were frequent  independent right and left temporal spike wave discharges as well as generalized spike wave discharges  These generalized spike wave discharges occasionally occurred in runs of generalized slow spike and wave discharges  There were very frequent generalized tonic and generalized tonic clonic seizures seen over the course of the tracing, as detailed below  During periods of relative wakefulness on EEG, there were long runs of centrally dominant, low amplitude 5-6 cps theta activities  Other findings: The single lead ECG demonstrated a regular rhythm  Events:   No push buttons were activated  Although no push buttons were activated, a total of 126 seizures were captured over the course of the tracing, as below  Seizure manifested clinically as either very subtle eye opening and tonic stiffening of arms, but most often progressed to also include whole body stiffening followed by clonic jerking movements  These seizures occurred almost exclusively out of sleep and typically lasted around 30-40 seconds  All of these seizures were electrographically similar, starting with a burst of generalized slow spike wave discharges at 2-2 5 cps that was followed by generalized attenuation of activity and low amplitude rhythmic, generalized beta activity that gradually increased in frequency and amplitude to be rhythmic alpha activity before abruptly attenuating  15:00-16:00: 16 seizures  16:00-17:00: 5 seizures  17:00- 23:00: no seizures, patient predominantly awake    23:00 - 00:00: 8 seizures  00:00 - 01:00: 14  01:00 - 02:00: 11 seizures  02:00-03:00: 13 seizures  03:00- 04:00:12 Seizures  04:00-05:00: 15 seizures  05:00-06:00: 10 seizures  06:00-07:00: 12 seizures  07:00-08:00: 10 seizures    -------------------------------------------------------------------------------------------------------------------   Interpretation: This prolonged, continuous video-EEG recording captured a total of 126 generalized tonic and generalized tonic clonic seizures, occurring nearly completely out of sleep  Background activities are disorganized and too slow suggesting moderate to severe diffuse bilateral cerebral dysfunction  Presence of background slowing, generalized spike wave discharges, multifocal spike wave discharges and runs of slow spike wave discharges are consistent with patient's known diagnosis of Lennox Gastaut syndrome  Further monitoring is suggested to help evaluate progress in treatment          Ana Luisa Briceño MD   [address] [facility]

## 2017-12-02 NOTE — PROGRESS NOTES
Progress Note - Critical Care   Tamiko Quiroz 40 y o  female MRN: [id number]  Unit/Bed#: MICU 06 Encounter: [id number]    Assessment/Plan:   Tamiko Quiroz is a 40 y o  female with past medical history significant for  Lennox-Gastaut syndrome, intellectual disability, recurrent/intractable seizures, anoxic brain injury, elevated serum ammonia levels  Patient is currently on 6 different anti seizure medications  The patient was recently seen and [facility] because of increasing somnolence which may be related to her current anti epileptic regimen  While the patient was most recently admitted the patient was evaluated by Neurology with a video EEG numerous changes were made to her current medication regimen  On video EEG was demonstrated the patient had greater than 62 seizures in less than 5 hours  The patient's medical course has been difficult to manage as the neurologist of attempted to balance her somnolence with her recurrent and recalcitrant seizure activity  During her last admission it was determined that they would change her on fiber regimen for 10 mg each morning and 20 mg at night to solely 20 mg each evening  The patient tolerated this well and stable to be discharged home safely on this new regimen      The patient had been doing well until approximately 1 day ago when she began to have episodes of upper and I have movement with twitching and posturing  These episodes continued till the patient presented emergency department at [address] [facility] [facility]  She had 2 mg of Ativan and then was subsequently given 5 mg of midazolam   The case was discussed with Neurology at which point it was determined that the patient be transferred to [facility] for further evaluation  Patient had a CT scan of the head which not show any acute abnormalities  While in the emergency department was knows that if her breathing was rhonchorous nurse evidence of aspiration    She had chest x-ray which demonstrated a new right middle lobe infiltrate  Patient Active Problem List   Diagnosis    Sepsis ([facility] )    [facility]    Dysphagia    HCAP (healthcare-associated pneumonia)    [facility] ([facility] )    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[facility] )    Lactic acidosis    Positive blood culture    Gastrostomy tube obstruction (HCC)    Increased ammonia level    Underweight    Intellectual disability    Hyperammonemia (HCC)    Acute DANIEL (middle ear effusion)    Macrocytic anemia    Hypokalemia    Acute respiratory failure (HCC)       Acute respiratory failure with hypoxemia, likely secondary to aspiration  Multifactorial, seizure disorder, receiving antiseizure medicine, and possible aspiration   O2 stat 92% on 6 L of O2 via NC, currently on continuous pulse oximetry monitoring  Lungs with rhonchi and rales  Chest x-ray demonstrates new right middle lobe infiltrate  Patient has been afebrile, but with temperatures up to 100 5  Patient has been tachycardic with rates as high as 128, but her heart rate has returned to normal less than 100         Seizure,  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She is currently on 6 different seizure medications as well as a vagal nerve stimulator and her seizures are very difficult to control  She did receive Valproate 1000mg IV x1 dose while at [facility]  Valproic acid level, 80  Keppra level, ordered  CT head, no acute intracranial abnormality  Neurology following  Continue telemetry   NPO  Place on 1:1 for continuous behavioral observation, patient has a history of attempting to remove lines wall agitated      History of hyperammonemia,  Ammonia level 79  Continue lactulose 35 g t i d      Fever,  Tmax  102 3 overnight  Possible aspiration, pneumonia now appearing more likely with the 1 time fever and increased heart rates  Ordered cefepimem 2g IV  X-ray findings of the above  Ordered UA, straight cath  Blood cultures, from 11/30 and 12 /1 are currently still pending    MRSA positive    Pneumonia, aspiration versus healthcare associated  Patient spiked a fever to 102 3 overnight  Patient's white blood cell count was 45055 this morning up from 8000 yesterday  Patient is more tachycardic today as well  As such, blood cultures were obtained overnight when she spiked a fever  Antibiotics were started broadly, inclusive of cefepime, vancomycin, metronidazole  (I discussed the current antibiotic regimen with the epileptologist, who if possible would prefer to not be using the cefepime, as this lowers seizure threshold )  Neuro:  Continue home anti epileptic medications with the following changes made by epileptologist yesterday:  Patient was on Onfi 20 mg q h s , this medication should be dosed b i d  with the following change made  Patient will now be on 5 mg each morning and 15 mg q h s  Otherwise continue with patient's medication regimen of Keppra 1 g Q 12, levocarnitine 500 mg t i d , banzel 1600 mg b i d , topiramate to 50 b i d , valproate 250 t i d ,                CV:  Patient's heart rates have been elevated since approximately midnight  Patient's heart rate prior to that had been in the 90s to low 100s, heart rates are currently now in the 120s to 130s  Continue telemetry for close monitoring of arrhythmias  Lung:  Possible, more likely now probable, aspiration pneumonia  Maintain NPO status  Patient spiked a fever to 102  3 overnight, blood cultures were sent and vancomycin, metronidazole, cefepime were initiated  Patient's white blood cell count this morning is 28,000  GI: Patient to be NPO until can be evaluated by speech  In the interim we will continue to use the patient's PEG tube as permissible  Patient currently has elevated ammonia level of 79  We will continue to trend this level  FEN:  Currently NPO, utilizing her PEG to for medication administration                   : Continue to monitor ins and outs  ID: Chest x-ray demonstrating right middle lobe consolidation consistent with aspiration pneumonia versus pneumonitis  Patient had been afebrile with a T-max of 100 5° but overnight the patient developed a fever to 102  3      her white blood cell count jumped from 8 2 to 28    blood cultures were sent at this time, antibiotics as above were initiated  Will continue to closely monitor but at this time it does appear that aspiration pneumonia will be much more likely at this time  Heme:  Patient has developed some worsening tachycardia overnight, blood pressures have actually improved over night  Endo:  Stable                            Msk/Skin:  Frequent repositioning per unit protocol, standard skin care precautions  Disposition:  Continue current level of ICU care, at this point it is important to continue with the antibiotics with possible tailoring of medications to her vent lower ringing of her seizure threshold  Specifically after speaking with Neurology if at all possible we probably should switch to an alternative medication regimen such as vancomycin/Zosyn  Chief Complaint:  Aspiration pneumonia, frequent seizures    HPI/24hr events:  Overnight the patient spiked fever to 102  3  Patient also became tachycardic heart rates were initially in the 100s to 110s with heart rates now increasing into the 120 to 130s  Blood cultures were obtained at this time  Antibiotics were initiated with vancomycin and metronidazole and cefepime  The patient's white blood cell count for this morning was 28,000 which is increased from 8000 the previous day  The patient's lungs sound more rhonchorous bilaterally at the bases this morning      Physical Exam:     General:  Female resting comfortably in the bed with notable contractures of bilateral upper extremities, grossly poor dentition, somnolent, difficult to arouse even to forceful stimuli the patient only localizes minimally  HEENT:  Normocephalic, atraumatic, with the exception of overall poor dentition, the mucous membranes of her oropharynx are mildly dry and there is notable of erosion on the inside of her lip secondary to when I was seem to be biting  Pupils are equal round reactive to light  No scleral icterus  Cardiovascular:  Tachycardic, regular rhythm  S1-S2 positive, no murmurs rubs or gallops  Pulmonary:  Patient's lungs are significantly more rhonchorous this morning  Right worse than left  Most prominent at the bases  No accessory muscle recruitment, no costal retractions, no tugging  Abdomen:  Soft nontender, no muscle rigidity, normal bowel sounds auscultated in all 4 quadrants  Neuro:  Pupillary reflexes are intact, patient is somnolent, patient is minimally responsive to forceful tactile stimulation (sternal rub illicits minimal localization from the upper extremities)  Skin:  Skin is warm dry and intact, no rashes noted,      Vitals:    17 0433 17 0521 17 0533 17 0633   BP: 126/79  113/72 120/68   Pulse: (!) 114 (!) 112 (!) 112 (!) 114   Resp: (!) 25 15 (!) 25 22   Temp:       TempSrc:       SpO2: 96% 96% 98% 98%   Weight:  44 4 kg (97 lb 14 2 oz)     Height:                 Temperature:   Temp (24hrs), Av 9 °F (37 7 °C), Min:98 8 °F (37 1 °C), Max:102 3 °F (39 1 °C)    Current: Temperature: 99 4 °F (37 4 °C)    Weights:   IBW: 50 1 kg    Body mass index is 17 9 kg/m²    Weight (last 2 days)     Date/Time   Weight    17 0521  44 4 (97 88)    17 0800  45 4 (100 09)    17 0556  50 3 (111)              Hemodynamic Monitoring:  N/A     Non-Invasive/Invasive Ventilation Settings:  Respiratory    Lab Data (Last 4 hours)    None         O2/Vent Data (Last 4 hours)    None              Lab Results   Component Value Date    PHART 7 333 (L) 2017    FLG5KOE 36 1 2017    PO2ART 151 0 (H) 2017    JCV7THW 18 7 (L) 2017 BEART -6 4 12/01/2017    SOURCE Radial, Right 12/01/2017     SpO2: SpO2: 98 %    Intake and Outputs:  I/O       11/30 0701 - 12/01 0700 12/01 0701 - 12/02 0700 12/02 0701 - 12/03 0700    I V  (mL/kg)  1512 5 (34 1)     IV Piggyback  503     Total Intake(mL/kg)  2015 5 (45 4)     Net   +2015 5             Unmeasured Urine Occurrence  5 x     Unmeasured Stool Occurrence  4 x         Nutrition:        Diet Orders            Start     Ordered    12/01/17 0802  Diet NPO; Sips with meds  Diet effective now     Question Answer Comment   Diet Type NPO    NPO Except: Sips with meds    RD to adjust diet per protocol? Yes        12/01/17 0801            Labs:     Results from last 7 days  Lab Units 12/02/17 0513 11/30/17  2329   WBC Thousand/uL 28 58* 8 29   HEMOGLOBIN g/dL 12 5 13 3   HEMATOCRIT % 38 2 40 6   PLATELETS Thousands/uL 200 205   NEUTROS PCT %  --  58   MONOS PCT %  --  14*      Results from last 7 days  Lab Units 12/02/17  0513 11/30/17 2329 11/27/17  0504   SODIUM mmol/L 139 142 141   POTASSIUM mmol/L 3 6 3 7 3 6   CHLORIDE mmol/L 111* 107 107   CO2 mmol/L 20* 23 26   BUN mg/dL 6 15 14   CREATININE mg/dL 0 53* 0 54* 0 39*   CALCIUM mg/dL 8 2* 9 1 8 9   TOTAL PROTEIN g/dL 6 8 7 9  --    BILIRUBIN TOTAL mg/dL 0 61 0 20  --    ALK PHOS U/L 81 95  --    ALT U/L 27 40  --    AST U/L 35 27  --    GLUCOSE RANDOM mg/dL 339* 143* 143*       Results from last 7 days  Lab Units 12/02/17  0513 11/30/17  2329   MAGNESIUM mg/dL 1 8 2 1       Results from last 7 days  Lab Units 12/02/17  0513 11/30/17  2329   PHOSPHORUS mg/dL 2 4* 4 0        Results from last 7 days  Lab Units 11/30/17  2329   INR  1 10   PTT seconds 29       Results from last 7 days  Lab Units 11/30/17  2329   LACTIC ACID mmol/L 1 8       0  Lab Value Date/Time   TROPONINI 0 03 11/30/2017 2329   TROPONINI <0 02 10/10/2017 0423   TROPONINI <0 02 07/05/2017 2354   TROPONINI <0 02 02/22/2017 0044       Imaging:  I have personally reviewed pertinent reports  Micro:  Lab Results   Component Value Date    BLOODCX No Growth After 5 Days  11/22/2017    BLOODCX No Growth After 5 Days  11/22/2017    BLOODCX No Growth After 5 Days  10/10/2017    BLOODCX No Growth After 5 Days  10/10/2017       Allergies: Allergies   Allergen Reactions    Felbamate        Medications:   Scheduled Meds:  artificial tear 1 application Ophthalmic TID   cefepime 2,000 mg Intravenous Q8H   cloBAZam 15 mg Per G Tube HS   cloBAZam 5 mg Oral Daily   enoxaparin 40 mg Subcutaneous Daily   lactulose 35 g Per G Tube TID   levETIRAcetam 1,000 mg Per G Tube Q12H [address]   levOCARNitine 500 mg Per G Tube TID   LORazepam 2 mg Intravenous Once   melatonin 6 mg Per G Tube HS   metroNIDAZOLE 500 mg Intravenous Q8H   multivitamin with iron-minerals 15 mL Per G Tube Daily   Perampanel 8 mg Per G Tube Daily   potassium chloride 40 mEq Per G Tube Daily   rufinamide 1,600 mg Per G Tube BID   saccharomyces boulardii 250 mg Per G Tube BID   senna-docusate sodium 1 tablet Per G Tube Daily   topiramate 250 mg Per G Tube BID   Valproic Acid 350 mg Per G Tube Q8H   vancomycin 15 mg/kg Intravenous Q8H     Continuous Infusions:  dextrose 5 % and sodium chloride 0 9 % 75 mL/hr Last Rate: 75 mL/hr (12/02/17 0342)     PRN Meds:    acetaminophen 650 mg Q4H PRN   ondansetron 4 mg Q6H PRN       VTE Pharmacologic Prophylaxis: Enoxaparin (Lovenox)  VTE Mechanical Prophylaxis: sequential compression device    Invasive lines and devices: Invasive Devices     Peripheral Intravenous Line            Peripheral IV 12/01/17 Right Forearm less than 1 day    Peripheral IV 12/02/17 Left Antecubital less than 1 day          Drain            Gastrostomy/Enterostomy Gastrostomy 18 Fr   days          Nasogastric/Orogastric Tube            Feeding Tube 303 days                   Counseling / Coordination of Care  Total Critical Care time spent 45 minutes excluding procedures, teaching and family updates         Code Status: Level 1 - Full Code     Portions of the record may have been created with voice recognition software  Occasional wrong word or "sound a like" substitutions may have occurred due to the inherent limitations of voice recognition software  Read the chart carefully and recognize, using context, where substitutions have occurred       Yemi Baker DO

## 2017-12-02 NOTE — PROGRESS NOTES
Epilepsy Consult Daily Progress Note   Mendy REYNOLDS 40 y o  female   : 1980  MRN: [id number]     Unit/Bed#: MICU 06    Encounter: [id number]  -------------------------------------------------------------------------------------------------------------------  S: Interval history:  After getting her scheduled medications yesterday morning, her seizures improved and she actually had several hours without seizures  Last night around 23:00, she began having seizures again  She had numerous tonic and tonic clonic seizures overnight and received Ativan this morning, after which her seizures decreased in frequency  She spiked a fever, with an elevated WBC count, and was started on broad spectrum antibiotics    -------------------------------------------------------------------------------------------------------------------    ROS:  ROS was not able to be obtained due to severe intellectual disability    -------------------------------------------------------------------------------------------------------------------  Allergies:    Allergies   Allergen Reactions    Felbamate       Scheduled Meds:   artificial tear 1 application Ophthalmic TID   cloBAZam 15 mg Per G Tube HS   cloBAZam 5 mg Oral Daily   enoxaparin 40 mg Subcutaneous Daily   lactulose 35 g Per G Tube TID   levETIRAcetam 1,000 mg Per G Tube Q12H [address]   levOCARNitine 500 mg Per G Tube TID   melatonin 6 mg Per G Tube HS   multivitamin with iron-minerals 15 mL Per G Tube Daily   Perampanel 8 mg Per G Tube Daily   piperacillin-tazobactam 3 375 g Intravenous Q6H   potassium chloride 40 mEq Per G Tube Daily   rufinamide 1,600 mg Per G Tube BID   saccharomyces boulardii 250 mg Per G Tube BID   senna-docusate sodium 1 tablet Per G Tube Daily   topiramate 250 mg Per G Tube BID   Valproic Acid 400 mg Per G Tube Q8H   vancomycin 15 mg/kg Intravenous Q8H     PRN Meds:   acetaminophen   ondansetron  -------------------------------------------------------------------------------------------------------------------  Physical Exam:  Vitals: Blood pressure 97/61, pulse 100, temperature 97 8 °F (36 6 °C), temperature source Oral, resp  rate 21, height 5' 2" (1 575 m), weight 44 4 kg (97 lb 14 2 oz), SpO2 98 %  Neurologic Exam:  Asleep and minimally arousable to noxious stimulation with sternal rub  She moves bilateral arms, but does not fully awaken or interact  Cranial nerves:   Pupils equally round and reactive  Face is symmetric  Eyes were conjugate with normal occulocephalics  Motor:   Unable to be tested  Sensation:  Responds to noxious stim        -------------------------------------------------------------------------------------------------------------------  LAB & TEST RESULTS:  Recent Labs      11/30/17 2329 12/02/17 0513   WBC  8 29  28 58*   HGB  13 3  12 5   HCT  40 6  38 2   PLT  205  200     Recent Labs      11/30/17 2329 12/02/17 0513   NA  142  139   K  3 7  3 6   CL  107  111*   CO2  23  20*   BUN  15  6   CREATININE  0 54*  0 53*   CALCIUM  9 1  8 2*   MG  2 1  1 8   PHOS  4 0  2 4*     Recent Labs      11/30/17 2329 12/02/17 0513   ALKPHOS  95  81   ALT  40  27   AST  27  35     Recent Labs      11/30/17 2329   INR  1 10   PTT  29       -------------------------------------------------------------------------------------------------------------------  Assessment/Plan:  Zbigniew Fan is a 40 y o  female with Lennox Gastaut Syndrome, intractable seizures, remote anoxic brain injury, prior status epilepticus and severe intellectual disability admitted for recurrent seizures and decreased oxygen saturations  She has continued to have seizures, which may be worsened by current infection  Cefepime can worsen seizures, but her seizure cluster had begun prior to cefepime administration   Her seizures this morning are worse than her typical baseline, so I will increase her Depakote and continue to monitor on EEG    Recommendations:  -- Will give addtitional 200 mg of Valproate and increase her scheduled dose to be 400 mg three times a day  -- Will recheck Valproate level in the morning    -- Continue her other seizure medications unchanged  --Management of aspiration PNA as per MICU team  I would recommend narrowing of antibiotics when appropriate to limit exposure to cefepime given intractable seizures       -------------------------------------------------------------------------------------------------------------------  Mili Canada MD        Date: 12/2/2017     Time: 12:55 PM   [address] [facility]

## 2017-12-03 ENCOUNTER — APPOINTMENT (INPATIENT)
Dept: RADIOLOGY | Facility: HOSPITAL | Age: 37
DRG: 100 | End: 2017-12-03
Payer: MEDICARE

## 2017-12-03 ENCOUNTER — APPOINTMENT (INPATIENT)
Dept: NEUROLOGY | Facility: AMBULATORY SURGERY CENTER | Age: 37
DRG: 100 | End: 2017-12-03
Payer: MEDICARE

## 2017-12-03 ENCOUNTER — GENERIC CONVERSION - ENCOUNTER (OUTPATIENT)
Dept: OTHER | Facility: OTHER | Age: 37
End: 2017-12-03

## 2017-12-03 LAB
ANION GAP SERPL CALCULATED.3IONS-SCNC: 7 MMOL/L (ref 4–13)
BASOPHILS # BLD AUTO: 0.03 THOUSANDS/ΜL (ref 0–0.1)
BASOPHILS NFR BLD AUTO: 0 % (ref 0–1)
BUN SERPL-MCNC: 4 MG/DL (ref 5–25)
CALCIUM SERPL-MCNC: 8.5 MG/DL (ref 8.3–10.1)
CHLORIDE SERPL-SCNC: 113 MMOL/L (ref 100–108)
CO2 SERPL-SCNC: 23 MMOL/L (ref 21–32)
CREAT SERPL-MCNC: 0.45 MG/DL (ref 0.6–1.3)
EOSINOPHIL # BLD AUTO: 0.06 THOUSAND/ΜL (ref 0–0.61)
EOSINOPHIL NFR BLD AUTO: 0 % (ref 0–6)
ERYTHROCYTE [DISTWIDTH] IN BLOOD BY AUTOMATED COUNT: 12.9 % (ref 11.6–15.1)
GFR SERPL CREATININE-BSD FRML MDRD: 129 ML/MIN/1.73SQ M
GLUCOSE SERPL-MCNC: 107 MG/DL (ref 65–140)
GLUCOSE SERPL-MCNC: 119 MG/DL (ref 65–140)
GLUCOSE SERPL-MCNC: 92 MG/DL (ref 65–140)
GLUCOSE SERPL-MCNC: 95 MG/DL (ref 65–140)
GLUCOSE SERPL-MCNC: 96 MG/DL (ref 65–140)
HCT VFR BLD AUTO: 38 % (ref 34.8–46.1)
HGB BLD-MCNC: 11.8 G/DL (ref 11.5–15.4)
LEVETIRACETAM SERPL-MCNC: NORMAL UG/ML (ref 10–40)
LYMPHOCYTES # BLD AUTO: 2.37 THOUSANDS/ΜL (ref 0.6–4.47)
LYMPHOCYTES NFR BLD AUTO: 17 % (ref 14–44)
MCH RBC QN AUTO: 32.7 PG (ref 26.8–34.3)
MCHC RBC AUTO-ENTMCNC: 31.1 G/DL (ref 31.4–37.4)
MCV RBC AUTO: 105 FL (ref 82–98)
MONOCYTES # BLD AUTO: 1.04 THOUSAND/ΜL (ref 0.17–1.22)
MONOCYTES NFR BLD AUTO: 8 % (ref 4–12)
NEUTROPHILS # BLD AUTO: 10.28 THOUSANDS/ΜL (ref 1.85–7.62)
NEUTS SEG NFR BLD AUTO: 75 % (ref 43–75)
NRBC BLD AUTO-RTO: 0 /100 WBCS
PLATELET # BLD AUTO: 178 THOUSANDS/UL (ref 149–390)
PMV BLD AUTO: 11.6 FL (ref 8.9–12.7)
POTASSIUM SERPL-SCNC: 3.3 MMOL/L (ref 3.5–5.3)
RBC # BLD AUTO: 3.61 MILLION/UL (ref 3.81–5.12)
SODIUM SERPL-SCNC: 143 MMOL/L (ref 136–145)
VALPROATE SERPL-MCNC: 108 UG/ML (ref 50–100)
WBC # BLD AUTO: 13.81 THOUSAND/UL (ref 4.31–10.16)

## 2017-12-03 PROCEDURE — 95951 HB EEG MONITORING/VIDEORECORD: CPT

## 2017-12-03 PROCEDURE — 82948 REAGENT STRIP/BLOOD GLUCOSE: CPT

## 2017-12-03 PROCEDURE — 85025 COMPLETE CBC W/AUTO DIFF WBC: CPT | Performed by: INTERNAL MEDICINE

## 2017-12-03 PROCEDURE — 80048 BASIC METABOLIC PNL TOTAL CA: CPT | Performed by: INTERNAL MEDICINE

## 2017-12-03 PROCEDURE — 80164 ASSAY DIPROPYLACETIC ACD TOT: CPT | Performed by: PSYCHIATRY & NEUROLOGY

## 2017-12-03 PROCEDURE — 94762 N-INVAS EAR/PLS OXIMTRY CONT: CPT

## 2017-12-03 PROCEDURE — 71010 HB CHEST X-RAY 1 VIEW FRONTAL (PORTABLE): CPT

## 2017-12-03 RX ORDER — POTASSIUM CHLORIDE 20MEQ/15ML
20 LIQUID (ML) ORAL ONCE
Status: COMPLETED | OUTPATIENT
Start: 2017-12-03 | End: 2017-12-03

## 2017-12-03 RX ADMIN — RUFINAMIDE 1600 MG: 200 TABLET, FILM COATED ORAL at 10:36

## 2017-12-03 RX ADMIN — LACTULOSE 35 G: 20 SOLUTION ORAL at 17:16

## 2017-12-03 RX ADMIN — ENOXAPARIN SODIUM 40 MG: 40 INJECTION SUBCUTANEOUS at 08:13

## 2017-12-03 RX ADMIN — POTASSIUM CHLORIDE 40 MEQ: 20 SOLUTION ORAL at 08:13

## 2017-12-03 RX ADMIN — CLOBAZAM 15 MG: 10 TABLET ORAL at 21:35

## 2017-12-03 RX ADMIN — LEVETIRACETAM 1000 MG: 100 SOLUTION ORAL at 08:14

## 2017-12-03 RX ADMIN — CLOBAZAM 5 MG: 10 TABLET ORAL at 08:13

## 2017-12-03 RX ADMIN — POTASSIUM CHLORIDE 20 MEQ: 20 SOLUTION ORAL at 08:13

## 2017-12-03 RX ADMIN — PIPERACILLIN SODIUM AND TAZOBACTAM SODIUM 3.38 G: 36; 4.5 INJECTION, POWDER, FOR SOLUTION INTRAVENOUS at 08:27

## 2017-12-03 RX ADMIN — MINERAL OIL AND WHITE PETROLATUM 1 APPLICATION: 150; 830 OINTMENT OPHTHALMIC at 21:25

## 2017-12-03 RX ADMIN — MINERAL OIL AND WHITE PETROLATUM 1 APPLICATION: 150; 830 OINTMENT OPHTHALMIC at 08:14

## 2017-12-03 RX ADMIN — TOPIRAMATE 250 MG: 100 TABLET, FILM COATED ORAL at 17:16

## 2017-12-03 RX ADMIN — MELATONIN TAB 3 MG 6 MG: 3 TAB at 21:35

## 2017-12-03 RX ADMIN — Medication 250 MG: at 08:17

## 2017-12-03 RX ADMIN — LACTULOSE 35 G: 20 SOLUTION ORAL at 21:35

## 2017-12-03 RX ADMIN — LEVOCARNITINE 500 MG: 1 SOLUTION ORAL at 17:16

## 2017-12-03 RX ADMIN — PERAMPANEL 8 MG: 4 TABLET ORAL at 08:13

## 2017-12-03 RX ADMIN — TOPIRAMATE 250 MG: 100 TABLET, FILM COATED ORAL at 08:16

## 2017-12-03 RX ADMIN — Medication 1 TABLET: at 08:13

## 2017-12-03 RX ADMIN — LEVOCARNITINE 500 MG: 1 SOLUTION ORAL at 21:26

## 2017-12-03 RX ADMIN — LEVOCARNITINE 500 MG: 1 SOLUTION ORAL at 08:14

## 2017-12-03 RX ADMIN — LACTULOSE 35 G: 20 SOLUTION ORAL at 08:13

## 2017-12-03 RX ADMIN — VANCOMYCIN HYDROCHLORIDE 750 MG: 750 INJECTION, SOLUTION INTRAVENOUS at 20:48

## 2017-12-03 RX ADMIN — PIPERACILLIN SODIUM AND TAZOBACTAM SODIUM 3.38 G: 36; 4.5 INJECTION, POWDER, FOR SOLUTION INTRAVENOUS at 21:17

## 2017-12-03 RX ADMIN — VALPROIC ACID 500 MG: 500 SOLUTION ORAL at 08:16

## 2017-12-03 RX ADMIN — LEVETIRACETAM 1000 MG: 100 SOLUTION ORAL at 21:27

## 2017-12-03 RX ADMIN — Medication 250 MG: at 17:16

## 2017-12-03 RX ADMIN — PIPERACILLIN SODIUM AND TAZOBACTAM SODIUM 3.38 G: 36; 4.5 INJECTION, POWDER, FOR SOLUTION INTRAVENOUS at 14:30

## 2017-12-03 RX ADMIN — VALPROIC ACID 500 MG: 500 SOLUTION ORAL at 17:16

## 2017-12-03 RX ADMIN — MINERAL OIL AND WHITE PETROLATUM 1 APPLICATION: 150; 830 OINTMENT OPHTHALMIC at 17:16

## 2017-12-03 RX ADMIN — RUFINAMIDE 1600 MG: 200 TABLET, FILM COATED ORAL at 17:17

## 2017-12-03 RX ADMIN — VANCOMYCIN HYDROCHLORIDE 750 MG: 750 INJECTION, SOLUTION INTRAVENOUS at 15:17

## 2017-12-03 RX ADMIN — VALPROIC ACID 500 MG: 500 SOLUTION ORAL at 00:41

## 2017-12-03 RX ADMIN — PIPERACILLIN SODIUM AND TAZOBACTAM SODIUM 3.38 G: 36; 4.5 INJECTION, POWDER, FOR SOLUTION INTRAVENOUS at 00:30

## 2017-12-03 RX ADMIN — VANCOMYCIN HYDROCHLORIDE 750 MG: 750 INJECTION, SOLUTION INTRAVENOUS at 04:28

## 2017-12-03 RX ADMIN — MULTIVITAMIN 15 ML: LIQUID ORAL at 08:17

## 2017-12-03 NOTE — PROGRESS NOTES
Report given to p7 RN  Patient transported via stretcher by RN and PCA, with o2, monitor, feeding pump and meds   Stable throughout transfer

## 2017-12-03 NOTE — PROGRESS NOTES
Patient arrived to unit  Observation PCA with patient  EMU set up  IV patent  Vitals stable  Tube feeding at 10ml/hr  Patient repositioned  FLACC=0  Call bell within reach  Patient responds to pain  Glascow=9       Will continue to monitor patient

## 2017-12-03 NOTE — PROCEDURES
Continuous Video EEG Monitoring       Patient Name:  Richard Eddy  MRN: [id number]   :  1980 File #: [id number]   Age: 40 y o  Encounter #: [id number]          Report date: 12/3/2017          Study type: Continuous video EEG    ICD 10 diagnosis: Generalized epilepsy intractable with status G40 311    Start time: 2017: 08:00 End time: 12/3/2017: 07:59     -------------------------------------------------------------------------------------------------------------------   Patient History: This recording was observed in a 40 y o  female with Bernadette Purple Syndrome with intractable seizures to determine frequency of seizures and guide therapy  Medications include:     artificial tear 1 application Ophthalmic TID   cloBAZam 15 mg Per G Tube HS   cloBAZam 5 mg Oral Daily   enoxaparin 40 mg Subcutaneous Daily   lactulose 35 g Per G Tube TID   levETIRAcetam 1,000 mg Per G Tube Q12H [address]   levOCARNitine 500 mg Per G Tube TID   melatonin 6 mg Per G Tube HS   multivitamin with iron-minerals 15 mL Per G Tube Daily   Perampanel 8 mg Per G Tube Daily   piperacillin-tazobactam 3 375 g Intravenous Q6H   potassium chloride 40 mEq Per G Tube Daily   rufinamide 1,600 mg Per G Tube BID   saccharomyces boulardii 250 mg Per G Tube BID   senna-docusate sodium 1 tablet Per G Tube Daily   topiramate 250 mg Per G Tube BID   Valproic Acid 500 mg Per G Tube Q8H   vancomycin 15 mg/kg Intravenous Q8H       -------------------------------------------------------------------------------------------------------------------   Description of Procedure:  · 32 channel digital recording with electrodes placed according to the International 10-20 system with additional T1/T2 electrodes, EOG, EKG, and simultaneous video  A monitoring technologist supervised the continuous recording   The recording was technically satisfactory  -------------------------------------------------------------------------------------------------------------------   Results:   Manual Review:   Manual review of the tracing was essentially unchanged from the prior day's recording  No age or state appropriate activities were seen  Instead, background activities consisted of reactive, generalized, poorly organized, predominantly theta and delta activities with mixed overriding faster activities  There were frequent  independent right and left temporal spike wave discharges as well as generalized spike wave discharges  These generalized spike wave discharges occasionally occurred in runs of generalized slow spike and wave discharges  There were very frequent generalized tonic and generalized tonic clonic seizures seen over the course of the tracing, as detailed below  Patient was predominantly asleep throughout the study, but following stimulation and during periods of relative wakefulness on EEG, there were long runs of centrally dominant, low amplitude 5-6 cps theta activities  Other findings: The single lead ECG demonstrated a regular rhythm  Events:   No push buttons were activated  Although no push buttons were activated, a total of 136 seizures were captured over the course of the tracing, as below  Seizures again clinically manifested with initial eye opening, then followed by tonic stiffening of arms, and less commonly would progress further to include clonic jerking movements of the limbs  Overall, these seizures were shorter in duration than those seen on the prior day's recording and tonic-clonic seizures were much less commonly seen  See below for hourly breakdown of seizures  These seizures again occurred almost exclusively out of sleep and typically lasted around 30-40 seconds   All of these seizures were electrographically similar, starting with a burst of generalized slow spike wave discharges at 2-2 5 cps that was followed by generalized attenuation of activity and low amplitude rhythmic, generalized beta activity that gradually increased in frequency and amplitude to be rhythmic alpha activity before abruptly attenuating  08:00-09:00: 11 seizures total  (2 tonic-clonic, 5 tonic, and 4 with subtle or no clinical change)  09:00-10:00: 11 seizures total  (4 tonic, and 7 with subtle or no clinical change)  10:00-11:00: 6 seizures total  (5 tonic, and 1 with subtle or no clinical change)  11:00-12:00: 4 seizures total  (4 tonic)  12:00-13:00: 5 seizures total  (5 tonic)  13:00-14:00: 8 seizures total  (7 tonic, and 1 with subtle or no clinical change)  14:00-15:00: 4 seizures total  ( 4 tonic)  15:00-16:00: 7 seizures total  (7 tonic)  16:00-17:00: 3 seizures total  (3 tonic)  17:00- 18:00: 4 seizures total  (4 tonic)  18:00-19:00: 3 seizures total  (2 tonic, and 1 with subtle or no clinical change)  19:00-20:00: 5 seizures total  (2 tonic, and 3 with subtle or no clinical change)  20:00-21:00: 2 seizures total  (1 tonic, and 1 with subtle or no clinical change)  21:00-22:00: 2 seizures total  (1 tonic, and 1 with subtle or no clinical change)  22:00-23:00: 0 seizures total    23:00 - 00:00: 6 seizures total  ( 6 tonic)  00:00 - 01:00: 2 seizures total  (2 tonic)  01:00 - 02:00: 8 seizures total  (8 tonic)  02:00-03:00: 10 seizures total  (10 tonic)  03:00- 04:00: 8 seizures total  (8 tonic)  04:00-05:00: 7 seizures total  (6 tonic, and 1 with subtle or no clinical change)   05:00-06:00: 9 seizures total  (8 tonic, and 1 with subtle or no clinical change)  06:00-07:00: 5 seizures total  (2 tonic, and 3 with subtle or no clinical change)  07:00-08:00: 6 seizures total  (6 with subtle or no clinical change)    -------------------------------------------------------------------------------------------------------------------   Interpretation:    This prolonged, continuous video-EEG recording captured a total of 136 generalized tonic seizures, seizures with only subtle clinical changes, or no clinical change  Although seizure frequency is not dramatically different from the prior day's recording, there was an overall trend to less prominent clinical changes compared to the prior day's study  Background activities are disorganized and too slow suggesting moderate to severe diffuse bilateral cerebral dysfunction  Presence of background slowing, generalized spike wave discharges, multifocal spike wave discharges and runs of slow spike wave discharges are consistent with patient's known diagnosis of Lennox Gastaut syndrome  Further monitoring is suggested to help evaluate progress in treatment          Mayelin Stinson MD   [address] [facility] [place]

## 2017-12-03 NOTE — PROGRESS NOTES
Progress Note - ICU Transfer to Step down Level 2  - Karyle Pillion 40 y o  female MRN: [id number]    [facility]: [id number]      Code Status: Level 1 - Full Code    Date of ICU admission:  12/1/2017     Reason for ICU adm:  Acute Hypoxic Respiratory failure likely 2/2 to aspiration pneumonia  Active problems:   Patient Active Problem List   Diagnosis    Sepsis ([facility] )    [facility]    Dysphagia    HCAP (healthcare-associated pneumonia)    Seizure disorder ([facility] )    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[facility] )    Lactic acidosis    Positive blood culture    Gastrostomy tube obstruction (HCC)    Increased ammonia level    Underweight    Intellectual disability    Hyperammonemia (HCC)    Acute DANIEL (middle ear effusion)    Macrocytic anemia    Hypokalemia    Acute respiratory failure (HCC)       Summary of clinical course:  Patient is a 67-year-old female with past medical history of Lennox-Gastaut syndrome, intellectual disability, current/intractable seizures who presented with altered mental status  Patient has had multiple ED visits to [facility] for evaluation of recurrent seizures  She has most recently admitted 10/10/2017  At that time video EEG showed multiple seizures and Neurology optimized medication prior to discharge  Patient was reportedly doing well until 12/1/2017 when staff agreed home noted frequent twitching and posturing  Additionally, on presentation to ED, chest x-ray demonstrated new right middle lobe infiltrate  Found to have acute hypoxic respiratory failure with O2 stat 92% on 6 L of O2 via Nc  This was likely 2/2 to episode of aspiration pneumonia  Patient was placed on cefepime, vancomycin, and Flagyl for broad-spectrum coverage  Epileptologist evaluated patient by video EEG which showed 126 episodes of generalized tonic and tonic colonic seizures    They recommended narrowing antibiotics to vancomycin and Zosyn due to the fact that cefepime can lower seizure threshold  Additionally, Depakote is being titrated to a therapeutic level  For likely aspiration pneumonia, repeat chest x-ray 12/3/2017 shows persistent right lower lobe infiltrate suspicious for bronchopneumonia  Patient had appropriate clinical response to antibiotics, currently afebrile with improving leukocytosis  Patient will need formal speech and swallow evaluation prior to resuming oral diet  Recent or scheduled procedures:  Continuous Video EEG monitoring    Outstanding/pending diagnostics:  None    Hospital discharge planning: To return to group home when medically stable

## 2017-12-03 NOTE — PROGRESS NOTES
Epilepsy Consult Daily Progress Note   Radha Anderson [id number] 40 y o  female   : 1980  MRN: [id number]     Unit/Bed#: [facility] 06    Encounter: [id number]  -------------------------------------------------------------------------------------------------------------------  S: Interval history:  She continued to have frequent tonic seizures throughout yesterday  She did not have any clear periods of wakefulness  Overall, her seizures were less severe than the previous day, without tonic-clonic seizures  Because of ongoing seizures and lower Depakote level of 56, she was given a bolus of 500 mg Depakote and her maintenance dose was increased to 500 mg three times a day  With this, her seizures continued to become less severe  Antibiotics were narrowed yesterday    -------------------------------------------------------------------------------------------------------------------    ROS:  ROS was not able to be obtained due to severe intellectual disability    -------------------------------------------------------------------------------------------------------------------  Allergies:    Allergies   Allergen Reactions    Felbamate       Scheduled Meds:     artificial tear 1 application Ophthalmic TID   cloBAZam 15 mg Per G Tube HS   cloBAZam 5 mg Oral Daily   enoxaparin 40 mg Subcutaneous Daily   lactulose 35 g Per G Tube TID   levETIRAcetam 1,000 mg Per G Tube Q12H [address]   levOCARNitine 500 mg Per G Tube TID   melatonin 6 mg Per G Tube HS   multivitamin with iron-minerals 15 mL Per G Tube Daily   Perampanel 8 mg Per G Tube Daily   piperacillin-tazobactam 3 375 g Intravenous Q6H   potassium chloride 40 mEq Per G Tube Daily   rufinamide 1,600 mg Per G Tube BID   saccharomyces boulardii 250 mg Per G Tube BID   senna-docusate sodium 1 tablet Per G Tube Daily   topiramate 250 mg Per G Tube BID   Valproic Acid 500 mg Per G Tube Q8H   vancomycin 15 mg/kg Intravenous Q8H     PRN Meds:   acetaminophen   ondansetron  -------------------------------------------------------------------------------------------------------------------  Physical Exam:  Vitals: Blood pressure 92/51, pulse 90, temperature 98 5 °F (36 9 °C), temperature source Oral, resp  rate 17, height 5' 2" (1 575 m), weight 45 kg (99 lb 3 3 oz), SpO2 98 %  Deferred today    -------------------------------------------------------------------------------------------------------------------  LAB & TEST RESULTS:  Recent Labs      12/02/17 0513 12/02/17 1836 12/03/17   0433   WBC  28 58*  21 13*  13 81*   HGB  12 5  11 7  11 8   HCT  38 2  36 4  38 0   PLT  200  189  178     Recent Labs      11/30/17 2329 12/02/17 0513 12/03/17   0433   NA  142  139  143   K  3 7  3 6  3 3*   CL  107  111*  113*   CO2  23  20*  23   BUN  15  6  4*   CREATININE  0 54*  0 53*  0 45*   CALCIUM  9 1  8 2*  8 5   MG  2 1  1 8   --    PHOS  4 0  2 4*   --      Recent Labs      11/30/17 2329 12/02/17 0513   ALKPHOS  95  81   ALT  40  27   AST  27  35     Recent Labs      11/30/17 2329   INR  1 10   PTT  29       -------------------------------------------------------------------------------------------------------------------  Assessment/Plan:  Angeline Peres is a 40 y o  female with Lennox Gastaut Syndrome, intractable seizures, remote anoxic brain injury, prior status epilepticus and severe intellectual disability admitted for recurrent seizures and decreased oxygen saturations  Overall, her seizures are less severe than yesterday  Although her total daily number of seizures was slightly increased yesterday than the day before, they are less severe, at times only with subtle clinical changes  Her mental status remains poor  It is unclear how much of this may be from her worsened seizures, adjustments in medications, or her aspiration pneumonia  It will be important to continue to follow over time       Ultimately, it may be reasonable to try to stop Keppra or decrease her Rufinamide  We potentially could also try to optimize Fycompa by increasing to 12 mg, but I would want to give her a little more time at these doses to assure her seizures are improving before making any changes  Recommendations:  -- Continue Depakote 500 mg three times a day, Keppra 1000 mg twice a day, Onfi 5 mg in the morning/10 mg at night, Rufinamide 1600 mg twice a day, Fycompa 8 mg daily, and Topiramate 250 mg twice a day  -- Will recheck Valproate level in the morning    --Management of aspiration PNA as per MICU team      I discussed possibility of transfer to floor with MICU team  From a neurologic standpoint, I have no objections  Her respiratory status is stable and her seizures are less frequent   I would continue to recommend continuous video EEG      -------------------------------------------------------------------------------------------------------------------  Geri Steiner MD        Date: 12/3/2017     Time: 1:36 PM   [address] [facility]

## 2017-12-03 NOTE — PLAN OF CARE
Problem: Potential for Falls  Goal: Patient will remain free of falls  INTERVENTIONS:  - Assess patient frequently for physical needs  -  Identify cognitive and physical deficits and behaviors that affect risk of falls    -  [place] fall precautions as indicated by assessment   - Educate patient/family on patient safety including physical limitations  - Instruct patient to call for assistance with activity based on assessment  - Modify environment to reduce risk of injury  - Consider OT/PT consult to assist with strengthening/mobility   Outcome: Progressing      Problem: Prexisting or High Potential for Compromised Skin Integrity  Goal: Skin integrity is maintained or improved  INTERVENTIONS:  - Identify patients at risk for skin breakdown  - Assess and monitor skin integrity  - Assess and monitor nutrition and hydration status  - Monitor labs (i e  albumin)  - Assess for incontinence   - Turn and reposition patient  - Assist with mobility/ambulation  - Relieve pressure over bony prominences  - Avoid friction and shearing  - Provide appropriate hygiene as needed including keeping skin clean and dry  - Evaluate need for skin moisturizer/barrier cream  - Collaborate with interdisciplinary team (i e  Nutrition, Rehabilitation, etc )   - Patient/family teaching   Outcome: Progressing      Problem: NEUROSENSORY - ADULT  Goal: Absence of seizures  INTERVENTIONS  - Monitor for seizure activity  - Administer anti-seizure medications as ordered  - Monitor neurological status   Outcome: Not Progressing    Goal: Remains free of injury related to seizures activity  INTERVENTIONS  - Maintain airway, patient safety  and administer oxygen as ordered  - Monitor patient for seizure activity, document and report duration and description of seizure to physician/advanced practitioner  - If seizure occurs,  ensure patient safety during seizure  - Reorient patient post seizure  - Seizure pads on all 4 side rails  - Instruct patient/family to notify RN of any seizure activity including if an aura is experienced  - Instruct patient/family to call for assistance with activity based on nursing assessment  - Administer anti-seizure medications as ordered  - Monitor fetal well being   Outcome: Progressing    Goal: Achieves maximal functionality and self care  INTERVENTIONS  - Monitor swallowing and airway patency with patient fatigue and changes in neurological status  - Encourage and assist patient to increase activity and self care with guidance from rehab services  - Encourage visually impaired, hearing impaired and aphasic patients to use assistive/communication devices   Outcome: Not Progressing

## 2017-12-03 NOTE — PROGRESS NOTES
Progress Note - Critical Care   Rashawn Card 40 y o  female MRN: [id number]  Unit/Bed#: [facility] 06 Encounter: [id number]    Assessment:   Patient is a 16-year-old female with past medical history of Lennox-Gastaut syndrome, electrical disability, current/intractable seizures originally presented with altered mental status  Patient had recent admission where her antiepileptic regimen was altered due to balance of sedation for seizure control  Patient was doing well until approximately 12/1/2017 when she noted to have frequent twitching and posturing  In ED, chest x-ray demonstrated new right middle lobe infiltrate likely aspiration pneumonia  Patient placed on cefepime, Vanco, Flagyl and admitted to ICU  Continuous EEG monitoring showed 126 generalized tonic and tonic colonic seizures  Due to possible lowering of seizure threshold by cefepime  Antibiotic regimen changed to vanc and Zosyn  Plan:          Neuro:  Continue antiepileptic regimen  Neurology following  Video EEG showed 126 seizures  Valproate increased to 400 mg t i d  yesterday  Repeat valproic level this morning 108  Appreciate Neurology recommendations  Continue Keppra 1 g Q 12, l-carnitine 500 mg t i d , benzol 1600 mg b i d , topiramate 50 mg b i d  Per report from overnight resident, if patient cc for greater 5 minutes call Neurology prior to giving p r n  Ativan  Continuous observation  CV:  Hemodynamics stable  Few episodes of tachycardia up to 108 overnight  Continue telemetry  Lung:  Likely aspiration pneumonia  Fever of 102 3 12/1  Afebrile since  Blood cultures show no growth 24 hours  On vancomycin day 3  Zosyn day 2  Repeat chest x-ray shows improvement of right lower lobe consolidation  Await final read  ON 4L nasal canula saturating 100% can titrate back                  GI:  Patient NPO until can be evaluated by speech  He has PEG tube for meds    Chronic hyper ammonia-continue lactulose 35 g t i d  FEN:  Currently NPO  Peg for medication  Speech and swallow evaluation when patient more awake  Replete potassium  :  Continue to monitor eyes and nose + 1386 over 24 hours  +3 4 since admit                 ID:  Antibiotics narrowed to vancomycin and Zosyn  Monitor off antibiotics today  Heme:  Hemoglobin stable platelets stable                 Endo:  Stable                            Msk/Skin:  Frequent repositioning monitoring for skin breakdown  Disposition:  Continue ICU level care  HPI/24hr events:   Valproate acid increased to 400 mg t i d   Continuous EEG showed 126 seizure events  Antibiotics narrowed to vancomycin Zosyn  Multiple episodes of seizures less than 30 seconds overnight  I&Os:  + 1 386 24 hours  +3 4 total  Drips:  None  Antibiotics:  Vancomycin day 3  Zosyn day 2  Lines:  2 peripheral IVs, peg 138 days  Airway:  4 L nasal cannula    Physical Exam:   Physical Exam   Constitutional: No distress  Sleeping   HENT:   Head: Normocephalic and atraumatic  Dry mucous membranes    Eyes: Conjunctivae are normal  Pupils are equal, round, and reactive to light  No scleral icterus  Cardiovascular: Normal rate, regular rhythm and normal heart sounds  No murmur heard  Pulmonary/Chest:   Coarse breath sounds b/l , On nasal cannula    Abdominal: Soft  Musculoskeletal: She exhibits no edema, tenderness or deformity  Neurological:   Somnolent, withdraws to pain    Skin: Skin is warm and dry  No rash noted  She is not diaphoretic           Vitals:    17 0233 17 0333 17 0400 17 0436   BP: 105/70 105/61  98/64   Pulse: 94 96 88 96   Resp: 21 (!) 23 12 14   Temp:   98 1 °F (36 7 °C)    TempSrc:   Axillary    SpO2: 100% 99% 100% 100%   Weight:    45 kg (99 lb 3 3 oz)   Height:                 Temperature:   Temp (24hrs), Av 3 °F (36 8 °C), Min:97 4 °F (36 3 °C), Max:99 8 °F (37 7 °C)    Current: Temperature: 98 1 °F (36 7 °C)    Weights:   IBW: 50 1 kg    Body mass index is 18 15 kg/m²  Weight (last 2 days)     Date/Time   Weight    12/03/17 0436  45 (99 21)    12/02/17 0521  44 4 (97 88)    12/01/17 0800  45 4 (100 09)    12/01/17 0556  50 3 (111)              Hemodynamic Monitoring:  N/A     Non-Invasive/Invasive Ventilation Settings:  Respiratory    Lab Data (Last 4 hours)    None         O2/Vent Data (Last 4 hours)    None              No results found for: PHART, RTW2BYT, PO2ART, VFM1WNO, F9TRUWTT, BEART, SOURCE  SpO2: SpO2: 100 %    Intake and Outputs:  I/O       12/01 0701 - 12/02 0700 12/02 0701 - 12/03 0700 12/03 0701 - 12/04 0700    I V  (mL/kg) 1512 5 (34 1) 576 3 (12 8)     NG/GT  210     IV Piggyback 503 600     Total Intake(mL/kg) 2015 5 (45 4) 1386 3 (30 8)     Net +2015 5 +1386 3             Unmeasured Urine Occurrence 5 x 4 x     Unmeasured Stool Occurrence 4 x 4 x         UOP: unmeasured      Nutrition:        Diet Orders            Start     Ordered    12/01/17 0802  Diet NPO; Sips with meds  Diet effective now     Question Answer Comment   Diet Type NPO    NPO Except: Sips with meds    RD to adjust diet per protocol?  Yes        12/01/17 0801        TF- NPO    Labs:     Results from last 7 days  Lab Units 12/03/17 0433 12/02/17  1836 12/02/17 0513 11/30/17  2329   WBC Thousand/uL 13 81* 21 13* 28 58* 8 29   HEMOGLOBIN g/dL 11 8 11 7 12 5 13 3   HEMATOCRIT % 38 0 36 4 38 2 40 6   PLATELETS Thousands/uL 178 189 200 205   NEUTROS PCT % 75 78*  --  58   MONOS PCT % 8 8  --  14*      Results from last 7 days  Lab Units 12/03/17  0433 12/02/17  0513 11/30/17  2329   SODIUM mmol/L 143 139 142   POTASSIUM mmol/L 3 3* 3 6 3 7   CHLORIDE mmol/L 113* 111* 107   CO2 mmol/L 23 20* 23   BUN mg/dL 4* 6 15   CREATININE mg/dL 0 45* 0 53* 0 54*   CALCIUM mg/dL 8 5 8 2* 9 1   TOTAL PROTEIN g/dL  --  6 8 7 9   BILIRUBIN TOTAL mg/dL  --  0 61 0 20   ALK PHOS U/L  --  81 95   ALT U/L --  27 40   AST U/L  --  35 27   GLUCOSE RANDOM mg/dL 95 339* 143*       Results from last 7 days  Lab Units 12/02/17  0513 11/30/17  2329   MAGNESIUM mg/dL 1 8 2 1       Results from last 7 days  Lab Units 12/02/17  0513 11/30/17  2329   PHOSPHORUS mg/dL 2 4* 4 0        Results from last 7 days  Lab Units 11/30/17  2329   INR  1 10   PTT seconds 29       Results from last 7 days  Lab Units 11/30/17  2329   LACTIC ACID mmol/L 1 8       0  Lab Value Date/Time   TROPONINI 0 03 11/30/2017 2329   TROPONINI <0 02 10/10/2017 0423   TROPONINI <0 02 07/05/2017 2354   TROPONINI <0 02 02/22/2017 0044       Imaging: CXR with improving RLL consolidation  Micro:  Lab Results   Component Value Date    BLOODCX No Growth at 24 hrs  12/01/2017    BLOODCX No Growth at 24 hrs  11/30/2017    BLOODCX No Growth After 5 Days  11/22/2017       Allergies: Allergies   Allergen Reactions    Felbamate        Medications:   Scheduled Meds:  artificial tear 1 application Ophthalmic TID   cloBAZam 15 mg Per G Tube HS   cloBAZam 5 mg Oral Daily   enoxaparin 40 mg Subcutaneous Daily   lactulose 35 g Per G Tube TID   levETIRAcetam 1,000 mg Per G Tube Q12H [facility] & long term   levOCARNitine 500 mg Per G Tube TID   melatonin 6 mg Per G Tube HS   multivitamin with iron-minerals 15 mL Per G Tube Daily   Perampanel 8 mg Per G Tube Daily   piperacillin-tazobactam 3 375 g Intravenous Q6H   potassium chloride 40 mEq Per G Tube Daily   rufinamide 1,600 mg Per G Tube BID   saccharomyces boulardii 250 mg Per G Tube BID   senna-docusate sodium 1 tablet Per G Tube Daily   topiramate 250 mg Per G Tube BID   Valproic Acid 500 mg Per G Tube Q8H   vancomycin 15 mg/kg Intravenous Q8H     Continuous Infusions:   PRN Meds:    acetaminophen 650 mg Q4H PRN   ondansetron 4 mg Q6H PRN       VTE Pharmacologic Prophylaxis: Enoxaparin (Lovenox)  VTE Mechanical Prophylaxis: sequential compression device    Invasive lines and devices:   Invasive Devices     Peripheral Intravenous Line Peripheral IV 12/01/17 Right Forearm 1 day    Peripheral IV 12/02/17 Left Antecubital 1 day          Drain            Gastrostomy/Enterostomy Gastrostomy 18 Fr   days          Nasogastric/Orogastric Tube            Feeding Tube 304 days                   Counseling / Coordination of Care  Total Critical Care time spent 45 minutes excluding procedures, teaching and family updates  Code Status: Level 1 - Full Code     Portions of the record may have been created with voice recognition software  Occasional wrong word or "sound a like" substitutions may have occurred due to the inherent limitations of voice recognition software  Read the chart carefully and recognize, using context, where substitutions have occurred       Ke Oswald,

## 2017-12-03 NOTE — CASE MANAGEMENT
Initial Clinical Review    Admission: Date/Time/Statement: 12/1/17 @ 0556     Orders Placed This Encounter   Procedures    Inpatient Admission     Standing Status:   Standing     Number of Occurrences:   1     Order Specific Question:   Admitting Physician     Answer:   Mando Encarncaion [[id number]]     Order Specific Question:   Level of Care     Answer:   Med Surg [[id number]]     Order Specific Question:   Estimated length of stay     Answer:   More than 2 Midnights     Order Specific Question:   Certification     Answer:   I certify that inpatient services are medically necessary for this patient for a duration of greater than two midnights  See H&P and MD Progress Notes for additional information about the patient's course of treatment  ED: Date/Time/Mode of Arrival:   ED Arrival Information     Expected Arrival Acuity Means of Arrival Escorted By Service Admission Type    12/1/2017 05:02 12/1/2017 05:51 Emergent Ambulance [address] [facility]  ([place]) Critical Care/ICU Emergency    Arrival Complaint    seizures          Chief Complaint:   Chief Complaint   Patient presents with    Seizure - Prior Hx Of     Patient is a  [facility] for [facility] for neuro consult  Patient comes from [facility] and has a known seizure hx with an increase in frequency and duration of seizures since yesterday  Per report from [facility], patient began having seizures around 1800 last night approx every 5-10 mins that were lasting 2-3 mins each that were unresolved by ativan and versed  History of Illness:  40 y o  female who has a past medical history of intellectual disability with 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and frequent seizures  Noted is that this patient has 6 different antiseizure medications  The patient was recently in [facility] because of somnolence and it may be related to her multiple seizure medications    Patient stayed in [facility] for about 5 days and the morning dose of Onfi was put on hold and video EEG was done that showed seizure activity  However neurology did not change any of her medications as she is already on 6 with a vagus nerve stimulator      The patient was doing quite well yesterday until approximately in the afternoon when during snack time, she then began to have episodes of upward eye rolling with twitching and posturing  Likewise, she presented with the same while in the emergency room of [address] [place] [address]  In any case she already had Ativan 2 mg which was followed by midazolam 5 mg  The case was then discussed with Neurology who then thought that the patient needed to be transferred to [facility] for further monitoring  Also, she is transferred here for neurology consult which is not available in [address] [place] [address] at present  Also, the patient had a CT scan of the head which did not show anything acute  ED Vital Signs:   ED Triage Vitals [12/01/17 0556]   Temperature Pulse Respirations Blood Pressure SpO2   100 2 °F (37 9 °C) (!) 123 (!) 24 127/74 98 %      Temp Source Heart Rate Source Patient Position - Orthostatic VS BP Location FiO2 (%)   Tympanic Monitor Lying Right arm --      Pain Score       No Pain        Wt Readings from Last 1 Encounters:   12/03/17 45 kg (99 lb 3 3 oz)       Vital Signs:  12/02 0701  12/03 0700 12/03 0701  12/03 1353  Most Recent     Temperature (°F) 97 4-99 8 98 5  98 5 (36 9)    Pulse  86-92  90    Respirations 12-25 17-30  17    Blood Pressure 87//72 90/54-97/64  92/51    SpO2 (%)    98        Abnormal Labs/Diagnostic Test Results: Cr 0 54, Glucose 143  Ct head -- No acute intracranial abnormality  Left otomastoiditis  No change  CXR -- No radiographic evidence of acute intrathoracic process  Past Medical/Surgical History:    Active Ambulatory Problems     Diagnosis Date Noted    Sepsis ([facility] ) 02/22/2017    [facility] 02/22/2017    Dysphagia 02/22/2017    HCAP (healthcare-associated pneumonia) 02/23/2017    Seizure disorder ([address] ) 07/06/2017    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[facility] ) 07/06/2017    Lactic acidosis 07/06/2017    Positive blood culture 07/07/2017    Gastrostomy tube obstruction (HCC) 07/14/2017    Increased ammonia level 11/22/2017    Underweight 11/22/2017    Intellectual disability 11/22/2017    Hyperammonemia (HCC) 11/23/2017    Acute DANIEL (middle ear effusion) 11/23/2017    Macrocytic anemia 11/24/2017    Hypokalemia 11/24/2017     Past Medical History:   Diagnosis Date    ADHD     Anoxic brain damage (HCC)     Autistic disorder     Epilepsy (HCC)     Hyperammonemia (HCC)     Hyperkeratosis     Hypotension     Hypotension     Intellectual disability     Intellectual disability     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with tonic seizures (HCC)     Lethargy     Liver enzyme elevation     Onychomycosis     Osteoporosis     Osteoporosis     Psychiatric disorder     Seizures (HCC)        Admitting Diagnosis: Seizure ([facility] ) [R56 9]  Seizures ([facility] ) [R56 9]    Age/Sex: 40 y o  female    Assessment/Plan:   Hospital Problem List:    Principal Problem:    Seizure ([address] )  Active Problems:    Hyperammonemia ([address] )    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[address] )    Intellectual disability      Plan for the Primary Problem(s):  · Admission, telemetry  · Seizure episodes in a patient with hard to control seizure disorder  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Patient is already on 6 kinds of anti seizure medications and that will be currently continued  Ativan for any intermittent seizure activity  Case has been discussed with Neurology while patient in [address] [place] [address] Drive and their thought is that she needs to be seen by Neurology otherwise no need for new video EEG  Will continue telemetry monitoring for arrhythmias  · History of hyperammonemia  Ammonia level has not yet been determined due to the lack of working laboratory machine in [address] [place] [address]    Hence, will determine on next blood draw  · Continuous behavioral observation  Patient is currently status post lorazepam and midazolam administration  Likewise, patient received Keppra 1 g intravenous x1 dose  Patient seems to be postictal at this point  However when patient is awake may be unable to follow instructions and remote self-care  Will place continuous behavioral observations  · Noted patient with mild fever possibly this could be secondary to post seizure activity  Patient has no white count  Will still get urinalysis  Blood cultures were taken x2 in [address] [place] [address] for Additional Problem(s):   · Dysphagia in a patient with intellectual disability and underweight  Will continue with feeds if possible  However, if patient has more frequent seizure activity, it may not be a good idea to feed patient to prevent aspiration  Start IV fluids      VTE Prophylaxis: Enoxaparin (Lovenox)  / sequential compression device   Code Status: Prior full  POLST: There is no POLST form on file for this patient (pre-hospital)     Anticipated Length of Stay:  Patient will be admitted on an No patient class for patient encounter basis with an anticipated length of stay of  greater than 2 midnights     Justification for Hospital Stay: Please see detailed plans noted above        Admission Orders:  Admit to MICU  Telem  Neuro checks q4h x24h, then q4h  Consult neurology  EEG video monitoring, 24 hr  Npo  O2 prn  Continuous 1:1 watch  Daily weights  venodynes b/l le      Scheduled Meds:   artificial tear 1 application Ophthalmic TID   cloBAZam 15 mg Per G Tube HS   cloBAZam 5 mg Oral Daily   enoxaparin 40 mg Subcutaneous Daily   lactulose 35 g Per G Tube TID   levETIRAcetam 1,000 mg Per G Tube Q12H [address]   levOCARNitine 500 mg Per G Tube TID   melatonin 6 mg Per G Tube HS   multivitamin with iron-minerals 15 mL Per G Tube Daily   Perampanel 8 mg Per G Tube Daily   piperacillin-tazobactam 3 375 g Intravenous Q6H   potassium chloride 40 mEq Per G Tube Daily   rufinamide 1,600 mg Per G Tube BID   saccharomyces boulardii 250 mg Per G Tube BID   senna-docusate sodium 1 tablet Per G Tube Daily   topiramate 250 mg Per G Tube BID   Valproic Acid 500 mg Per G Tube Q8H   vancomycin 15 mg/kg Intravenous Q8H     Continuous Infusions:    PRN Meds:   acetaminophen    ondansetron

## 2017-12-04 ENCOUNTER — APPOINTMENT (INPATIENT)
Dept: NEUROLOGY | Facility: AMBULATORY SURGERY CENTER | Age: 37
DRG: 100 | End: 2017-12-04
Payer: MEDICARE

## 2017-12-04 ENCOUNTER — TELEPHONE (OUTPATIENT)
Dept: OTHER | Facility: HOSPITAL | Age: 37
End: 2017-12-04

## 2017-12-04 LAB
AMMONIA PLAS-SCNC: 120 UMOL/L (ref 11–35)
ANION GAP SERPL CALCULATED.3IONS-SCNC: 6 MMOL/L (ref 4–13)
BUN SERPL-MCNC: 8 MG/DL (ref 5–25)
CALCIUM SERPL-MCNC: 8.7 MG/DL (ref 8.3–10.1)
CHLORIDE SERPL-SCNC: 111 MMOL/L (ref 100–108)
CO2 SERPL-SCNC: 26 MMOL/L (ref 21–32)
CREAT SERPL-MCNC: 0.46 MG/DL (ref 0.6–1.3)
ERYTHROCYTE [DISTWIDTH] IN BLOOD BY AUTOMATED COUNT: 12.6 % (ref 11.6–15.1)
GFR SERPL CREATININE-BSD FRML MDRD: 128 ML/MIN/1.73SQ M
GLUCOSE SERPL-MCNC: 103 MG/DL (ref 65–140)
GLUCOSE SERPL-MCNC: 127 MG/DL (ref 65–140)
GLUCOSE SERPL-MCNC: 128 MG/DL (ref 65–140)
GLUCOSE SERPL-MCNC: 134 MG/DL (ref 65–140)
GLUCOSE SERPL-MCNC: 143 MG/DL (ref 65–140)
HCT VFR BLD AUTO: 34.5 % (ref 34.8–46.1)
HGB BLD-MCNC: 11.2 G/DL (ref 11.5–15.4)
MCH RBC QN AUTO: 33.2 PG (ref 26.8–34.3)
MCHC RBC AUTO-ENTMCNC: 32.5 G/DL (ref 31.4–37.4)
MCV RBC AUTO: 102 FL (ref 82–98)
PLATELET # BLD AUTO: 174 THOUSANDS/UL (ref 149–390)
PMV BLD AUTO: 12.2 FL (ref 8.9–12.7)
POTASSIUM SERPL-SCNC: 3 MMOL/L (ref 3.5–5.3)
RBC # BLD AUTO: 3.37 MILLION/UL (ref 3.81–5.12)
SODIUM SERPL-SCNC: 143 MMOL/L (ref 136–145)
VALPROATE SERPL-MCNC: 97 UG/ML (ref 50–100)
WBC # BLD AUTO: 8.27 THOUSAND/UL (ref 4.31–10.16)

## 2017-12-04 PROCEDURE — 92610 EVALUATE SWALLOWING FUNCTION: CPT

## 2017-12-04 PROCEDURE — 94762 N-INVAS EAR/PLS OXIMTRY CONT: CPT

## 2017-12-04 PROCEDURE — 94760 N-INVAS EAR/PLS OXIMETRY 1: CPT

## 2017-12-04 PROCEDURE — 82140 ASSAY OF AMMONIA: CPT | Performed by: PSYCHIATRY & NEUROLOGY

## 2017-12-04 PROCEDURE — 82948 REAGENT STRIP/BLOOD GLUCOSE: CPT

## 2017-12-04 PROCEDURE — 85027 COMPLETE CBC AUTOMATED: CPT | Performed by: INTERNAL MEDICINE

## 2017-12-04 PROCEDURE — 80048 BASIC METABOLIC PNL TOTAL CA: CPT | Performed by: INTERNAL MEDICINE

## 2017-12-04 PROCEDURE — 80164 ASSAY DIPROPYLACETIC ACD TOT: CPT | Performed by: PSYCHIATRY & NEUROLOGY

## 2017-12-04 PROCEDURE — 95951 HB EEG MONITORING/VIDEORECORD: CPT

## 2017-12-04 RX ORDER — RUFINAMIDE 200 MG/1
1200 TABLET, FILM COATED ORAL 2 TIMES DAILY
Status: DISCONTINUED | OUTPATIENT
Start: 2017-12-04 | End: 2017-12-07 | Stop reason: HOSPADM

## 2017-12-04 RX ADMIN — ENOXAPARIN SODIUM 40 MG: 40 INJECTION SUBCUTANEOUS at 08:43

## 2017-12-04 RX ADMIN — MELATONIN TAB 3 MG 6 MG: 3 TAB at 21:48

## 2017-12-04 RX ADMIN — MULTIVITAMIN 15 ML: LIQUID ORAL at 08:42

## 2017-12-04 RX ADMIN — PERAMPANEL 8 MG: 4 TABLET ORAL at 08:42

## 2017-12-04 RX ADMIN — VALPROIC ACID 500 MG: 500 SOLUTION ORAL at 02:54

## 2017-12-04 RX ADMIN — CLOBAZAM 5 MG: 10 TABLET ORAL at 08:42

## 2017-12-04 RX ADMIN — LACTULOSE 35 G: 20 SOLUTION ORAL at 08:41

## 2017-12-04 RX ADMIN — VALPROIC ACID 500 MG: 500 SOLUTION ORAL at 08:41

## 2017-12-04 RX ADMIN — LEVOCARNITINE 500 MG: 1 SOLUTION ORAL at 17:24

## 2017-12-04 RX ADMIN — Medication 250 MG: at 08:42

## 2017-12-04 RX ADMIN — Medication 250 MG: at 17:25

## 2017-12-04 RX ADMIN — LEVOCARNITINE 500 MG: 1 SOLUTION ORAL at 08:43

## 2017-12-04 RX ADMIN — RUFINAMIDE 1200 MG: 200 TABLET, FILM COATED ORAL at 17:25

## 2017-12-04 RX ADMIN — LACTULOSE 35 G: 20 SOLUTION ORAL at 17:24

## 2017-12-04 RX ADMIN — PIPERACILLIN SODIUM AND TAZOBACTAM SODIUM 3.38 G: 36; 4.5 INJECTION, POWDER, FOR SOLUTION INTRAVENOUS at 02:50

## 2017-12-04 RX ADMIN — TOPIRAMATE 250 MG: 100 TABLET, FILM COATED ORAL at 08:42

## 2017-12-04 RX ADMIN — Medication 1 TABLET: at 08:42

## 2017-12-04 RX ADMIN — TOPIRAMATE 250 MG: 100 TABLET, FILM COATED ORAL at 17:26

## 2017-12-04 RX ADMIN — PIPERACILLIN SODIUM AND TAZOBACTAM SODIUM 3.38 G: 36; 4.5 INJECTION, POWDER, FOR SOLUTION INTRAVENOUS at 05:21

## 2017-12-04 RX ADMIN — MINERAL OIL AND WHITE PETROLATUM 1 APPLICATION: 150; 830 OINTMENT OPHTHALMIC at 08:40

## 2017-12-04 RX ADMIN — LEVOCARNITINE 500 MG: 1 SOLUTION ORAL at 21:45

## 2017-12-04 RX ADMIN — VANCOMYCIN HYDROCHLORIDE 750 MG: 750 INJECTION, SOLUTION INTRAVENOUS at 03:51

## 2017-12-04 RX ADMIN — CLOBAZAM 15 MG: 10 TABLET ORAL at 21:45

## 2017-12-04 RX ADMIN — RUFINAMIDE 1600 MG: 200 TABLET, FILM COATED ORAL at 08:43

## 2017-12-04 RX ADMIN — MINERAL OIL AND WHITE PETROLATUM 1 APPLICATION: 150; 830 OINTMENT OPHTHALMIC at 17:23

## 2017-12-04 RX ADMIN — LEVETIRACETAM 1000 MG: 100 SOLUTION ORAL at 08:43

## 2017-12-04 RX ADMIN — VALPROIC ACID 500 MG: 500 SOLUTION ORAL at 17:26

## 2017-12-04 RX ADMIN — POTASSIUM CHLORIDE 40 MEQ: 20 SOLUTION ORAL at 08:41

## 2017-12-04 RX ADMIN — LACTULOSE 35 G: 20 SOLUTION ORAL at 21:45

## 2017-12-04 RX ADMIN — LEVETIRACETAM 1000 MG: 100 SOLUTION ORAL at 21:45

## 2017-12-04 NOTE — PROGRESS NOTES
[facility] Progress Note  Patient: Brittani Umaña 40 y o  female   MRN: [id number]  PCP: Isabela Bolivar DO  [facility] Encounter: [id number]  Date Of Visit: 12/04/17    Assessment and Plan:    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[facility]):  Patient still has persistent seizures on EEG  No visible seizures  But patient is completely unresponsive and lethargic  Epileptologist following  Antiepileptics as per their recommendation  Patient is still on video EEG    Possible aspiration pneumonitis POA:   Possibly due to seizures  Chest x-ray showing right lower lobe opacity  Patient has been on vanc and Zosyn  I do not suspect that this is infection, this is probably chemical pneumonitis  I will DC antibiotics since she has not had any more fever and her white count is also normal   Monitor off antibiotics  If she spikes fever again will start vanc and Zosyn    Acute hypoxic respiratory failure:  Due to above  Resolved for now  Patient currently on room air  Dysphagia:  Patient currently on tube feedings at home  Continue with tube feedings for now    Intellectual disability:  Due to 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VTE Pharmacologic Prophylaxis:   Pharmacologic: Enoxaparin (Lovenox)  Mechanical VTE Prophylaxis in Place: Yes    Patient Centered Rounds: I have performed bedside rounds with nursing staff today  Discussions with Specialists or Other Care Team Provider: neuro    Education and Discussions with Family / Patient:  Sabiha De La Fuente calling mother but unable to teacher  Will try calling again later  Time Spent for Care: 45 minutes  More than 50% of total time spent on counseling and coordination of care as described above      Current Length of Stay: 3 day(s)    Current Patient Status: Inpatient   Certification Statement: The patient will continue to require additional inpatient hospital stay due to above    Discharge Plan / Estimated Discharge Date: once medically stable and cleared from Neuro standpoint    Code Status: Level 1 - Full Code      Subjective:   Pt seen and examined by me this morning  Pt is completely lethargic  She would not open her eyes or follow any commands  She was withdrawing to pain  Objective:     Vitals:   Temp (24hrs), Av 7 °F (37 1 °C), Min:97 7 °F (36 5 °C), Max:99 6 °F (37 6 °C)    HR:  [] 91  Resp:  [16-20] 18  BP: ()/(51-72) 109/58  SpO2:  [93 %-100 %] 93 %  Body mass index is 18 15 kg/m²  Input and Output Summary (last 24 hours): Intake/Output Summary (Last 24 hours) at 17 1038  Last data filed at 17 0932   Gross per 24 hour   Intake              370 ml   Output                0 ml   Net              370 ml       Physical Exam:     Physical Exam    Constitutional: Pt appears poorly developed and nourished  Not in any acute distress  She is completely lethargic  She did not open her eyes or follow any commands when called  As per the one-to-one that is how the patient has been  Cardiovascular: Normal rate, regular rhythm, normal heart sounds and intact distal pulses  Exam reveals no gallop and no friction rub  No murmur heard  Pulmonary/Chest: Effort normal and breath sounds normal  No respiratory distress  Bilateral lower lobe faint crackles  no wheezing  Abdominal: Soft  Nondistended Bowel sounds are normal   peg tube present  Neurological:  His complete drowsy and lethargic  Did not open her eyes or follow any commands  She did withdraw her extremities to painful stimuli      Additional Data:     Labs:      Results from last 7 days  Lab Units 17  0441 17  0433   WBC Thousand/uL 8 27 13 81*   HEMOGLOBIN g/dL 11 2* 11 8   HEMATOCRIT % 34 5* 38 0   PLATELETS Thousands/uL 174 178   NEUTROS PCT %  --  75   LYMPHS PCT %  --  17   MONOS PCT %  --  8   EOS PCT %  --  0       Results from last 7 days  Lab Units 17  0441  17  0513   SODIUM mmol/L 143  < > 139   POTASSIUM mmol/L 3 0*  < > 3 6   CHLORIDE mmol/L 111*  < > 111*   CO2 mmol/L 26  < > 20*   BUN mg/dL 8  < > 6   CREATININE mg/dL 0 46*  < > 0 53*   CALCIUM mg/dL 8 7  < > 8 2*   TOTAL PROTEIN g/dL  --   --  6 8   BILIRUBIN TOTAL mg/dL  --   --  0 61   ALK PHOS U/L  --   --  81   ALT U/L  --   --  27   AST U/L  --   --  35   GLUCOSE RANDOM mg/dL 134  < > 339*   < > = values in this interval not displayed  Results from last 7 days  Lab Units 11/30/17  2329   INR  1 10       * I Have Reviewed All Lab Data Listed Above  * Additional Pertinent Lab Tests Reviewed: Maddie [facility] Admission Reviewed    Imaging:    Imaging Reports Reviewed Today Include:   Imaging Personally Reviewed by Myself Includes:      Recent Cultures (last 7 days):       Results from last 7 days  Lab Units 12/01/17  0048 11/30/17 2329   BLOOD CULTURE  No Growth at 72 hrs  No Growth at 72 hrs  Last 24 Hours Medication List:     artificial tear 1 application Ophthalmic TID   cloBAZam 15 mg Per G Tube HS   cloBAZam 5 mg Oral Daily   enoxaparin 40 mg Subcutaneous Daily   lactulose 35 g Per G Tube TID   levETIRAcetam 1,000 mg Per G Tube Q12H [facility] & longterm   levOCARNitine 500 mg Per G Tube TID   melatonin 6 mg Per G Tube HS   multivitamin with iron-minerals 15 mL Per G Tube Daily   Perampanel 8 mg Per G Tube Daily   potassium chloride 40 mEq Per G Tube Daily   rufinamide 1,600 mg Per G Tube BID   saccharomyces boulardii 250 mg Per G Tube BID   senna-docusate sodium 1 tablet Per G Tube Daily   topiramate 250 mg Per G Tube BID   Valproic Acid 500 mg Per G Tube Q8H        Today, Patient Was Seen By: Marilou Pearson MD    ** Please Note: This note has been constructed using a voice recognition system   **

## 2017-12-04 NOTE — PROGRESS NOTES
TF residual at 150ml  TF placed on hold  Per SLIM hold for 2 hours and check residual again  Will continue to monitor

## 2017-12-04 NOTE — PHYSICIAN ADVISOR
Current patient class: Inpatient  The patient is currently on Hospital Day: 3      The patient was admitted to the hospital  on 12/1/17 at [phone] for the following diagnosis:  Seizure ([address] ) [R56 9]  Seizures ([address] ) [R56 9]       There is documentation in the medical record of an expected length of stay of at least 2 midnights  The patient is therefore expected to satisfy the 2 midnight benchmark and given the 2 midnight presumption is appropriate for INPATIENT ADMISSION  Given this expectation of a satisfying stay, CMS instructs us that the patient is most often appropriate for inpatient admission under part A provided medical necessity is documented in the chart  After review of the relevant documentation, labs, vital signs and test results, the patient is appropriate for INPATIENT ADMISSION  Admission to the hospital as an inpatient is a complex decision making process which requires the practitioner to consider the patients presenting complaint, history and physical examination and all relevant testing  With this in mind, in this case, the patient was deemed appropriate for INPATIENT ADMISSION  After review of the documentation and testing available at the time of the admission I concur with this clinical determination of medical necessity  The patient does have an inpatient admission within the previous 30 days  The patient was admitted on 11/30/17 and discharged on 12/1/17 as an inpatient  The patient therefore required readmission review  In this case the patient should be considered a RELATED INPATIENT ADMISSION  She presents within 5 days of discharge with the same medical condition  Rationale is as follows: The patient is a 40 yrs   Female who presented to the ED at 12/1/2017  5:51 AM with a chief complaint of Seizure - Prior Hx Of (Patient is a  [facility] [facility] for [place] for neuro consult   Patient comes from [address] [place] [address] and has a known seizure hx with an increase in frequency and duration of seizures since yesterday   Per report from [facility], patient began having seizures around 1800 last night approx every 5-10 mins that were lasting 2-3 mins each that were unresolved by ativan and versed   )    The patients vitals on arrival were ED Triage Vitals [12/01/17 0556]   Temperature Pulse Respirations Blood Pressure SpO2   100 2 °F (37 9 °C) (!) 123 (!) 24 127/74 98 %      Temp Source Heart Rate Source Patient Position - Orthostatic VS BP Location FiO2 (%)   Tympanic Monitor Lying Right arm --      Pain Score       No Pain           Past Medical History:   Diagnosis Date    ADHD     Anoxic brain damage (HCC)     Autistic disorder     Epilepsy (HCC)     Hyperammonemia (HCC)     Hyperkeratosis     Hypotension     Hypotension     Intellectual disability     Intellectual disability     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with tonic seizures (HCC)     Lethargy     Liver enzyme elevation     Onychomycosis     Osteoporosis     Osteoporosis     Psychiatric disorder     anxiety    Seizures (HCC)      Past Surgical History:   Procedure Laterality Date    CARDIAC PACEMAKER PLACEMENT      JEJUNOSTOMY FEEDING TUBE      PEG TUBE PLACEMENT             Consults have been placed to:   IP CONSULT TO NEUROLOGY  IP CONSULT TO NEUROLOGY  IP CONSULT TO MEDICAL CRITICAL CARE    Vitals:    12/03/17 1500 12/03/17 1533 12/03/17 1600 12/03/17 1842   BP:  100/61  100/71   Pulse: 100  98 95   Resp: 17  19 16   Temp:   99 2 °F (37 3 °C) 98 3 °F (36 8 °C)   TempSrc:   Oral Oral   SpO2: 100%  100% 100%   Weight:       Height:           Most recent labs:    Recent Labs      11/30/17   2329  12/02/17   0513   12/03/17   0433   WBC  8 29  28 58*   < >  13 81*   HGB  13 3  12 5   < >  11 8   HCT  40 6  38 2   < >  38 0   PLT  205  200   < >  178   K  3 7  3 6   --   3 3*   NA  142  139   --   143   CALCIUM  9 1  8 2*   --   8 5   BUN  15  6   --   4*   CREATININE  0 54*  0 53*   --   0 45*   INR  1 10   --    --    -- TROPONINI  0 03   --    --    --    AST  27  35   --    --    ALT  40  27   --    --    ALKPHOS  95  81   --    --    BILITOT  0 20  0 61   --    --     < > = values in this interval not displayed         Scheduled Meds:  artificial tear 1 application Ophthalmic TID   cloBAZam 15 mg Per G Tube HS   cloBAZam 5 mg Oral Daily   enoxaparin 40 mg Subcutaneous Daily   lactulose 35 g Per G Tube TID   levETIRAcetam 1,000 mg Per G Tube Q12H [address]   levOCARNitine 500 mg Per G Tube TID   melatonin 6 mg Per G Tube HS   multivitamin with iron-minerals 15 mL Per G Tube Daily   Perampanel 8 mg Per G Tube Daily   piperacillin-tazobactam 3 375 g Intravenous Q6H   potassium chloride 40 mEq Per G Tube Daily   rufinamide 1,600 mg Per G Tube BID   saccharomyces boulardii 250 mg Per G Tube BID   senna-docusate sodium 1 tablet Per G Tube Daily   topiramate 250 mg Per G Tube BID   Valproic Acid 500 mg Per G Tube Q8H   vancomycin 15 mg/kg Intravenous Q8H     Continuous Infusions:   PRN Meds:   acetaminophen    ondansetron

## 2017-12-04 NOTE — SPEECH THERAPY NOTE
Speech/Language Pathology  Assessment    Patient Name: Angeline Peres  PGAIT'I Date: 12/4/2017     Problem List  Patient Active Problem List   Diagnosis    Sepsis ([facility] )    Seizure ([facility] )    Dysphagia    HCAP (healthcare-associated pneumonia)    Seizure disorder ([facility] )    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[facility] )    Lactic acidosis    Positive blood culture    Gastrostomy tube obstruction (HCC)    Increased ammonia level    Underweight    Intellectual disability    Hyperammonemia (HCC)    Acute DANIEL (middle ear effusion)    Macrocytic anemia    Hypokalemia    Acute respiratory failure (HCC)     Past Medical History  Past Medical History:   Diagnosis Date    ADHD     Anoxic brain damage ([facility] )     Autistic disorder     Epilepsy ([place] [address] )     Hyperammonemia (HCC)     Hyperkeratosis     Hypotension     Hypotension     Intellectual disability     Intellectual disability     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with tonic seizures (HCC)     Lethargy     Liver enzyme elevation     Onychomycosis     Osteoporosis     Osteoporosis     Psychiatric disorder     anxiety    Seizures (HCC)      Past Surgical History  Past Surgical History:   Procedure Laterality Date    CARDIAC PACEMAKER PLACEMENT      JEJUNOSTOMY FEEDING TUBE      PEG TUBE PLACEMENT       Patient is a 55-year-old female with past medical history of Lennox-Gastaut syndrome, intellectual disability, current/intractable seizures who presented with altered mental status  Patient has had multiple ED visits to [facility] for evaluation of recurrent seizures  She has most recently admitted 10/10/2017  At that time video EEG showed multiple seizures and Neurology optimized medication prior to discharge  Patient was reportedly doing well until 12/1/2017 when staff agreed home noted frequent twitching and posturing  Additionally, on presentation to ED, chest x-ray demonstrated new right middle lobe infiltrate   Found to have acute hypoxic respiratory failure with O2 stat 92% on 6 L of O2 via [place]  This was likely 2/2 to episode of aspiration pneumonia  Patient was placed on cefepime, vancomycin, and Flagyl for broad-spectrum coverage  Epileptologist evaluated patient by video EEG which showed 126 episodes of generalized tonic and tonic colonic seizures  They recommended narrowing antibiotics to vancomycin and Zosyn due to the fact that cefepime can lower seizure threshold  Additionally, Depakote is being titrated to a therapeutic level  For likely aspiration pneumonia, repeat chest x-ray 12/3/2017 shows persistent right lower lobe infiltrate suspicious for bronchopneumonia  Patient had appropriate clinical response to antibiotics, currently afebrile with improving leukocytosis  Patient will need formal speech and swallow evaluation prior to resuming oral diet        CXR-Persistent right lower lobe infiltrate suspicious for bronchopneumonia    Reason for consult:  R/o aspiration  Determine safest and least restrictive diet    Precautions:  Continual observation, sz  Current diet:  NPO, tube feeding  Premorbid diet[de-identified]  Tube feeding, regular finger foods/thin, pleasure feeds  Per staff at [facility]- pt will at times eat a large amt vs not eat at all if she is comatose  There are many days she will sleep a whole 8 hour shift  Previous VBS:  -  O2 requirement:  nc  Voice/Speech:  Nonverbal, not alert  Social:  [facility]  Follows commands:                  -        Cognitive Status:  Pt is completely unresponsive  Summary:  Pt w/ RLL infiltrate, ?able aspiration  Spoke w/ staff who know pt well- pt is not a good candidate for a VBS as she most likely will not comply  Pt is not appropriate to be assessed at this time as she is completely unresponsive  Pt is known to the department from previous stays  I have not gotten to assess her with a large amt of material any stay here as she had refused    She is takes more at her familiar facility, but even then it is fluctuating  Recommendations:  Keep Pt NPO w/ tf only  SLP will assess bedside as able  Aspiration precautions     Results d/w:  Pt, nsg    Goal(s):    Pt will tolerate least restrictive diet w/out s/s aspiration or oral/pharyngeal difficulties

## 2017-12-04 NOTE — PROGRESS NOTES
Epilepsy Consult Follow-up Note  Patient Name: Linnea Boles  MRN: [id number]  Date: 12/04/17 Time: 2:24 PM     LOS: 3 days     Interval History:  Multiple seizures per hour on VEEG, most with very mild clinical correlate (slight eye opening), a few associated with mild arm movements as well  Fevers resolved, WBC improved  Antibiotics stopped  Transferred from MICU to [address] level drawn at time of admission on 12/1  Spoke to nurse at patient's facility  He recalls giving morning dose of levetiracetam on 77/89, but is not certain whether evening dose was given  Nursing notes describe alert on 11/30 at 21:00 and then she was sent out with EMS at 23:00  It's possible evening levetiracetam was missed if the nurse was running a bit behind  It is not clear from the nursing notes if aspiration was due to seizure  Medications   artificial tear 1 application Ophthalmic TID   cloBAZam 15 mg Per G Tube HS   cloBAZam 5 mg Oral Daily   enoxaparin 40 mg Subcutaneous Daily   lactulose 35 g Per G Tube TID   levETIRAcetam 1,000 mg Per G Tube Q12H [address]   levOCARNitine 500 mg Per G Tube TID   melatonin 6 mg Per G Tube HS   multivitamin with iron-minerals 15 mL Per G Tube Daily   Perampanel 8 mg Per G Tube Daily   potassium chloride 40 mEq Per G Tube Daily   rufinamide 1,600 mg Per G Tube BID   saccharomyces boulardii 250 mg Per G Tube BID   senna-docusate sodium 1 tablet Per G Tube Daily   topiramate 250 mg Per G Tube BID   Valproic Acid 500 mg Per G Tube Q8H       Continuous Infusions:   PRN Meds:  acetaminophen    ondansetron     Physical Exam   HR:  [] 88  Resp:  [16-20] 20  BP: ()/(52-71) 94/56  SpO2:  [93 %-100 %] 95 %  Resting peacefully  Abdomen: soft  Neurologic Exam  Arouses briefly to moderate tactile stimulation and nasal tickle  Gaze conjugate  Test Results:  VEEG Results the last 24 hrs: Please see separate report      VPA 56 --> 108 --> 97  Ammonia 79 --> 48 --> 120    Impression and Plan:  Linnea Boles is a 40 y o  female with Kerline Counts Syndrome, intractable seizures, remote anoxic brain injury, prior status epilepticus and severe intellectual disability admitted for recurrent seizures and decreased oxygen saturations  Her course has been complicated by aspiration pneumonia  Seizures more clinically subtle today  Continue to occur multiple times per hour, primarily during sleep (as is her typical pattern)  -- Will decrease rufinamide to 1200 mg twice daily (order placed)  Benefit is not clear  May be adding to encephalopathy  Interaction with valproate means that rufinamide level will increase following increased valproate dose  Decreased rufinamide dose could also results in lower valproate level  -- Continue Depakote 500 mg three times a day, Keppra 1000 mg twice a day, Onfi 5 mg in the morning/10 mg at night, Fycompa 8 mg daily, and Topiramate 250 mg twice a day  -- Check valproate level in AM (order placed)  -- Continue VEEG monitoring to quantify frequent seizures and evaluate for interval change  Encephalopathy beyond baseline and increased somnolence is multifactorial (recent infection, polypharmacy, frequent seizures, elevated ammonia) in setting of baseline severe intellectual disability  She remains somnolent and only arouses briefly  Ammonia up to 120 today in the setting of increased valproate dose  -- Primary team monitoring for infection in setting of recent pneumonia  -- Monitor ammonia given recent increase  May be related to increased valproate  Valproate seems to have correlated with improved seizures, but treatment of infection could have also improved seizures  Total floor time spent today 23 minutes  More than 50% of time with patient       Alexx Strong MD Date: 12/4/2017 Time: 2:24 PM

## 2017-12-05 ENCOUNTER — APPOINTMENT (INPATIENT)
Dept: NEUROLOGY | Facility: AMBULATORY SURGERY CENTER | Age: 37
DRG: 100 | End: 2017-12-05
Payer: MEDICARE

## 2017-12-05 ENCOUNTER — GENERIC CONVERSION - ENCOUNTER (OUTPATIENT)
Dept: OTHER | Facility: OTHER | Age: 37
End: 2017-12-05

## 2017-12-05 LAB
ATRIAL RATE: 101 BPM
GLUCOSE SERPL-MCNC: 141 MG/DL (ref 65–140)
GLUCOSE SERPL-MCNC: 144 MG/DL (ref 65–140)
GLUCOSE SERPL-MCNC: 144 MG/DL (ref 65–140)
GLUCOSE SERPL-MCNC: 161 MG/DL (ref 65–140)
P AXIS: 66 DEGREES
PR INTERVAL: 134 MS
QRS AXIS: 72 DEGREES
QRSD INTERVAL: 66 MS
QT INTERVAL: 330 MS
QTC INTERVAL: 427 MS
T WAVE AXIS: 36 DEGREES
VALPROATE SERPL-MCNC: 91 UG/ML (ref 50–100)
VENTRICULAR RATE: 101 BPM

## 2017-12-05 PROCEDURE — 80164 ASSAY DIPROPYLACETIC ACD TOT: CPT | Performed by: PSYCHIATRY & NEUROLOGY

## 2017-12-05 PROCEDURE — 95951 HB EEG MONITORING/VIDEORECORD: CPT

## 2017-12-05 PROCEDURE — 82948 REAGENT STRIP/BLOOD GLUCOSE: CPT

## 2017-12-05 PROCEDURE — 94762 N-INVAS EAR/PLS OXIMTRY CONT: CPT

## 2017-12-05 PROCEDURE — 94760 N-INVAS EAR/PLS OXIMETRY 1: CPT

## 2017-12-05 PROCEDURE — 80165 DIPROPYLACETIC ACID FREE: CPT | Performed by: PSYCHIATRY & NEUROLOGY

## 2017-12-05 PROCEDURE — 80177 DRUG SCRN QUAN LEVETIRACETAM: CPT | Performed by: PSYCHIATRY & NEUROLOGY

## 2017-12-05 PROCEDURE — 92526 ORAL FUNCTION THERAPY: CPT

## 2017-12-05 RX ORDER — LEVOCARNITINE 1 G/10ML
1000 SOLUTION ORAL 2 TIMES DAILY WITH MEALS
Status: DISCONTINUED | OUTPATIENT
Start: 2017-12-05 | End: 2017-12-07 | Stop reason: HOSPADM

## 2017-12-05 RX ADMIN — MINERAL OIL AND WHITE PETROLATUM 1 APPLICATION: 150; 830 OINTMENT OPHTHALMIC at 17:20

## 2017-12-05 RX ADMIN — LEVETIRACETAM 1000 MG: 100 SOLUTION ORAL at 22:51

## 2017-12-05 RX ADMIN — LEVETIRACETAM 1000 MG: 100 SOLUTION ORAL at 10:08

## 2017-12-05 RX ADMIN — VALPROIC ACID 500 MG: 500 SOLUTION ORAL at 10:05

## 2017-12-05 RX ADMIN — POTASSIUM CHLORIDE 40 MEQ: 20 SOLUTION ORAL at 10:06

## 2017-12-05 RX ADMIN — VALPROIC ACID 500 MG: 500 SOLUTION ORAL at 17:20

## 2017-12-05 RX ADMIN — VALPROIC ACID 500 MG: 500 SOLUTION ORAL at 02:24

## 2017-12-05 RX ADMIN — LACTULOSE 35 G: 20 SOLUTION ORAL at 22:52

## 2017-12-05 RX ADMIN — Medication 250 MG: at 10:07

## 2017-12-05 RX ADMIN — TOPIRAMATE 250 MG: 100 TABLET, FILM COATED ORAL at 10:04

## 2017-12-05 RX ADMIN — LACTULOSE 35 G: 20 SOLUTION ORAL at 10:06

## 2017-12-05 RX ADMIN — MINERAL OIL AND WHITE PETROLATUM 1 APPLICATION: 150; 830 OINTMENT OPHTHALMIC at 10:06

## 2017-12-05 RX ADMIN — LEVOCARNITINE 1000 MG: 1 SOLUTION ORAL at 17:22

## 2017-12-05 RX ADMIN — TOPIRAMATE 250 MG: 100 TABLET, FILM COATED ORAL at 17:21

## 2017-12-05 RX ADMIN — CLOBAZAM 15 MG: 10 TABLET ORAL at 22:52

## 2017-12-05 RX ADMIN — MELATONIN TAB 3 MG 6 MG: 3 TAB at 22:52

## 2017-12-05 RX ADMIN — Medication 250 MG: at 17:21

## 2017-12-05 RX ADMIN — RUFINAMIDE 1200 MG: 200 TABLET, FILM COATED ORAL at 10:07

## 2017-12-05 RX ADMIN — ENOXAPARIN SODIUM 40 MG: 40 INJECTION SUBCUTANEOUS at 10:04

## 2017-12-05 RX ADMIN — LACTULOSE 35 G: 20 SOLUTION ORAL at 17:20

## 2017-12-05 RX ADMIN — LEVOCARNITINE 500 MG: 1 SOLUTION ORAL at 10:08

## 2017-12-05 RX ADMIN — MINERAL OIL AND WHITE PETROLATUM 1 APPLICATION: 150; 830 OINTMENT OPHTHALMIC at 22:51

## 2017-12-05 RX ADMIN — CLOBAZAM 5 MG: 10 TABLET ORAL at 10:07

## 2017-12-05 RX ADMIN — Medication 1 TABLET: at 10:07

## 2017-12-05 RX ADMIN — PERAMPANEL 8 MG: 4 TABLET ORAL at 10:12

## 2017-12-05 RX ADMIN — MULTIVITAMIN 15 ML: LIQUID ORAL at 10:06

## 2017-12-05 RX ADMIN — RUFINAMIDE 1200 MG: 200 TABLET, FILM COATED ORAL at 17:21

## 2017-12-05 NOTE — CASE MANAGEMENT
Continued Stay Review    Date: 12-5-17     Vital Signs: /62   Pulse 85   Temp 98 2 °F (36 8 °C) (Axillary)   Resp 16   Ht 5' 2" (1 575 m)   Wt 46 kg (101 lb 6 6 oz)   SpO2 96%   BMI 18 55 kg/m²     Medications:   Scheduled Meds:   artificial tear 1 application Ophthalmic TID   cloBAZam 15 mg Per G Tube HS   cloBAZam 5 mg Oral Daily   enoxaparin 40 mg Subcutaneous Daily   lactulose 35 g Per G Tube TID   levETIRAcetam 1,000 mg Per G Tube Q12H [address]   levOCARNitine 500 mg Per G Tube TID   melatonin 6 mg Per G Tube HS   multivitamin with iron-minerals 15 mL Per G Tube Daily   Perampanel 8 mg Per G Tube Daily   potassium chloride 40 mEq Per G Tube Daily   rufinamide 1,200 mg Per G Tube BID   saccharomyces boulardii 250 mg Per G Tube BID   senna-docusate sodium 1 tablet Per G Tube Daily   topiramate 250 mg Per G Tube BID   Valproic Acid 500 mg Per G Tube Q8H     Continuous Infusions:    PRN Meds:   acetaminophen    ondansetron      Age/Sex: 40 y o  female     Assessment/Plan:    NEUROLOGY    Stephen Le is a 40 y o  female with Lennox Gastaut Syndrome, intractable seizures, remote anoxic brain injury, prior status epilepticus and severe intellectual disability admitted for recurrent seizures and decreased oxygen saturations  Her course has been complicated by aspiration pneumonia        Seizures more clinically subtle today  Continue to occur multiple times per hour, primarily during sleep (as is her typical pattern)  -- Will decrease rufinamide to 1200 mg twice daily (order placed)  Benefit is not clear  May be adding to encephalopathy  Interaction with valproate means that rufinamide level will increase following increased valproate dose  Decreased rufinamide dose could also results in lower valproate level  -- Continue Depakote 500 mg three times a day, Keppra 1000 mg twice a day, Onfi 5 mg in the morning/10 mg at night, Fycompa 8 mg daily, and Topiramate 250 mg twice a day    -- Check valproate level in AM (order placed)  -- Continue VEEG monitoring to quantify frequent seizures and evaluate for interval change       Encephalopathy beyond baseline and increased somnolence is multifactorial (recent infection, polypharmacy, frequent seizures, elevated ammonia) in setting of baseline severe intellectual disability  She remains somnolent and only arouses briefly  Ammonia up to 120 today in the setting of increased valproate dose  -- Primary team monitoring for infection in setting of recent pneumonia  -- Monitor ammonia given recent increase  May be related to increased valproate  Valproate seems to have correlated with improved seizures, but treatment of infection could have also improved seizures        Discharge Plan: return to [facility] at d/c  [facility] referral made

## 2017-12-05 NOTE — PROGRESS NOTES
Brief Epilepsy/Neurology Note  Spoke with nurse  Reviewed EEG through 17:15 today  Exam deferred today  Seizure remain similar to yesterday  She was awake for a number of hours this afternoon  Will continue AEDs unchanged  Will check labs in the morning including valproate level and ammonia       Jaki Castrejon MD  12/5/2017 5:38 PM

## 2017-12-05 NOTE — MALNUTRITION/BMI
This medical record reflects one or more clinical indicators suggestive of Underweight        BMI Findings:  <19    Body mass index is 18 15 kg/m²  See Nutrition note dated 12/4/2017  for additional details  Completed nutrition assessment is viewable in the nutrition documentation

## 2017-12-05 NOTE — PROCEDURES
Continuous Video EEG Monitoring       Patient Name:  Darek Hebert  MRN: [id number]   :  1980 File #: [id number]   Age: 40 y o  Encounter #: [id number]   Study Start: 2017 08:00  Study End: 2017 08:00           Report date: 2017          Study type: Continuous video EEG    ICD 10 diagnosis: G40 311    -------------------------------------------------  Patient History: This recording was observed in a 40 y o  female with Ojni Kinds Syndrome with intractable seizures to determine frequency of seizures and guide therapy  Medications include:     artificial tear 1 application Ophthalmic TID   cloBAZam 15 mg Per G Tube HS   cloBAZam 5 mg Oral Daily   enoxaparin 40 mg Subcutaneous Daily   lactulose 35 g Per G Tube TID   levETIRAcetam 1,000 mg Per G Tube Q12H [address]   levOCARNitine 500 mg Per G Tube TID   melatonin 6 mg Per G Tube HS   multivitamin with iron-minerals 15 mL Per G Tube Daily   Perampanel 8 mg Per G Tube Daily   potassium chloride 40 mEq Per G Tube Daily   rufinamide 1,200 mg Per G Tube BID   saccharomyces boulardii 250 mg Per G Tube BID   senna-docusate sodium 1 tablet Per G Tube Daily   topiramate 250 mg Per G Tube BID   Valproic Acid 500 mg Per G Tube Q8H       -------------------------------------------------  Description of Procedure:  32 channel digital recording with electrodes placed according to the International 10-20 system with additional T1/T2 electrodes, EOG, EKG, and simultaneous video  A monitoring technologist supervised the continuous recording  The recording was technically satisfactory  -------------------------------------------------  Results:   Manual Review:   No age or state appropriate activities were seen  Instead, background activities consisted of reactive, diffuse, poorly organized, low to medium amplitude theta/delta activity with intermixed lower amplitude alpha/beta activities   At times there anteriorly predominant low to medium amplitude activities near 4 cps  At times there was diffuse relative suppression with very low amplitude beta activities  During sleep the background often contained diffuse low to medium amplitude, mixed frequency 2-4 cps activities  Features of stage 2 sleep were not present  Much of the recording occurs during apparent sleep, but there are significant periods of wakefulness  At times with stimulation and relative arousal there was relative attenuation of delta activities  There were frequent generalized spike and polyspike wave discharges that at times occurred in brief bursts near 2 cps  There were frequent independent left and right temporal spikes  There were tonic seizures, at times with brief clonic component, as described below  Other findings:  Samples of the single channel ECG demonstrated a regular rhythm  Events/Seizures: There were 77 seizures detected by manual review (supplemented by automatic detection) during this study  Seizures last from about 10 seconds to 27 seconds in duration  Most seizures involve eye opening, many seizures involved tonic stiffening of the arms (often L > R) and at times during longer seizures there were brief mild clonic jerks of the arms  Most seizures appeared to start out of sleep, but some seizures occurred during wakefulness  Electrographically the seizures consisted of diffuse attenuation with emergence of low amplitude rhythmic beta activity and/or medium to high amplitude rhythmic spike wave discharges near 2 cps  37 seizures were reviewed on video and 29 of these seizures had clinical correlate  Hour Seizures per hour   8 0   9 6   10 9   11 7   12 5   13 8   14 2   15 1   16 1   17 3   18 0   19 0   20 0   21 2   23 1   0 0   1 1   2 8   3 4   4 7   5 6   6 6   7 0   Grand Total 77       -------------------------------------------------  Interpretation:    This prolonged, continuous video-EEG recording is abnormal      77 electrographic seizures, lasting about 10 seconds to 27 seconds in duration are captured during this 24 hour study  78% of seizures reviewed on video had clinical correlate  Most seizures involve eye opening, many seizures involve relatively mild tonic stiffening of the arms (often L > R) and at times during longer seizures there are brief mild clonic jerks of the arms  Most seizures occur out of apparent sleep, but some seizures occur during wakefulness  Electrographic characteristics are consistent with seizures captured during prior monitoring periods  A background of diffuse, poorly organized theta and intermittent delta activities suggests moderate nonspecific diffuse cerebral dysfunction  Frequent generalized epileptiform discharges as well as independent left and right temporal epileptiform discharges indicate underlying epileptogenic potential and are consistent with the patient's diagnosis of Lennox Gastaut syndrome       MD Chel Hunter [facility]

## 2017-12-05 NOTE — PROGRESS NOTES
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[facility] ):  Patient still has persistent seizures on EEG  No visible seizures  Epileptologist following  Antiepileptics as per their recommendation  Patient is still on video EEG     Possible aspiration pneumonitis POA:   Possibly due to seizures  Chest x-ray showing right lower lobe opacity  Patient has been on vanc and Zosyn  Antibiotics stopped 12/4 since she had not had any more fever and her white count is also normal   Monitor off antibiotics  If she spikes fever again will start vanc and Zosyn     Acute hypoxic respiratory failure:  Due to above  Resolved for now  Patient currently on room air      Dysphagia:  Patient currently on tube feedings at home  Will keep NPO as pt high risk for aspiration and gets all her nutrition from TF     Intellectual disability:  Due to 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Toxic metabolic encephalopathy:  Likely 2/2 high ammonia and AEDs  Check ammonia tomorrow - if still going up, chg TF to Jevity 1 0    Underweight:  TF      VTE Pharmacologic Prophylaxis:   Pharmacologic: Enoxaparin (Lovenox)  Mechanical VTE Prophylaxis in Place: Yes    Patient Centered Rounds: I have performed bedside rounds with nursing staff today  Discussions with Specialists or Other Care Team Provider: not today    Education and Discussions with Family / Patient: not today    Time Spent for Care: 30 minutes  More than 50% of total time spent on counseling and coordination of care as described above      Current Length of Stay: 4 day(s)    Current Patient Status: Inpatient   Certification Statement: The patient will continue to require additional inpatient hospital stay due to need for monitoring TME    Discharge Plan / Estimated Discharge Date: when cleared by neuro    Code Status: Level 1 - Full Code      Subjective:   PT seen and examined  Awake this AM     Objective:     Vitals:   Temp (24hrs), Av 2 °F (36 8 °C), Min:97 5 °F (36 4 °C), Max:98 9 °F (37 2 °C)    HR:  [81-88] 85  Resp:  [16-20] 16  BP: ()/(54-62) 100/62  SpO2:  [94 %-98 %] 98 %  Body mass index is 18 55 kg/m²  Input and Output Summary (last 24 hours): Intake/Output Summary (Last 24 hours) at 17 1120  Last data filed at 17 0837   Gross per 24 hour   Intake              520 ml   Output                0 ml   Net              520 ml       Physical Exam:     Physical Exam   Constitutional: No distress  malnourished   HENT:   Head: Normocephalic and atraumatic  Eyes: Conjunctivae and EOM are normal    Neck: Normal range of motion  Neck supple  Cardiovascular: Normal rate and regular rhythm  Pulmonary/Chest: Effort normal and breath sounds normal    Abdominal: Soft  Bowel sounds are normal  She exhibits no distension  There is no tenderness  Musculoskeletal: She exhibits no edema or deformity  Neurological:   asleep   Skin: Skin is warm and dry  She is not diaphoretic           Additional Data:     Labs:      Results from last 7 days  Lab Units 17  0441 17  0433   WBC Thousand/uL 8 27 13 81*   HEMOGLOBIN g/dL 11 2* 11 8   HEMATOCRIT % 34 5* 38 0   PLATELETS Thousands/uL 174 178   NEUTROS PCT %  --  75   LYMPHS PCT %  --  17   MONOS PCT %  --  8   EOS PCT %  --  0       Results from last 7 days  Lab Units 17  0441  17  0513   SODIUM mmol/L 143  < > 139   POTASSIUM mmol/L 3 0*  < > 3 6   CHLORIDE mmol/L 111*  < > 111*   CO2 mmol/L 26  < > 20*   BUN mg/dL 8  < > 6   CREATININE mg/dL 0 46*  < > 0 53*   CALCIUM mg/dL 8 7  < > 8 2*   TOTAL PROTEIN g/dL  --   --  6 8   BILIRUBIN TOTAL mg/dL  --   --  0 61   ALK PHOS U/L  --   --  81   ALT U/L  --   --  27   AST U/L  --   --  35   GLUCOSE RANDOM mg/dL 134  < > 339*   < > = values in this interval not displayed  Results from last 7 days  Lab Units 11/30/17  2329   INR  1 10       * I Have Reviewed All Lab Data Listed Above  * Additional Pertinent Lab Tests Reviewed: Maddie 66 Admission Reviewed        Recent Cultures (last 7 days):       Results from last 7 days  Lab Units 12/01/17  0048 11/30/17  2329   BLOOD CULTURE  No Growth After 4 Days  No Growth After 4 Days  Last 24 Hours Medication List:     artificial tear 1 application Ophthalmic TID   cloBAZam 15 mg Per G Tube HS   cloBAZam 5 mg Oral Daily   enoxaparin 40 mg Subcutaneous Daily   lactulose 35 g Per G Tube TID   levETIRAcetam 1,000 mg Per G Tube Q12H [facility]   levOCARNitine 500 mg Per G Tube TID   melatonin 6 mg Per G Tube HS   multivitamin with iron-minerals 15 mL Per G Tube Daily   Perampanel 8 mg Per G Tube Daily   potassium chloride 40 mEq Per G Tube Daily   rufinamide 1,200 mg Per G Tube BID   saccharomyces boulardii 250 mg Per G Tube BID   senna-docusate sodium 1 tablet Per G Tube Daily   topiramate 250 mg Per G Tube BID   Valproic Acid 500 mg Per G Tube Q8H        Today, Patient Was Seen By: Karla Reason, DO    ** Please Note: This note has been constructed using a voice recognition system   **

## 2017-12-05 NOTE — SPEECH THERAPY NOTE
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[facility] )    Lactic acidosis    Positive blood culture    Gastrostomy tube obstruction (HCC)    Increased ammonia level    Underweight    Intellectual disability    Hyperammonemia (HCC)    Acute DANIEL (middle ear effusion)    Macrocytic anemia    Hypokalemia    Acute respiratory failure (HCC)     Past Medical History  Past Medical History:   Diagnosis Date    ADHD     Anoxic brain damage ([facility] )     Autistic disorder     Epilepsy ([place] [address] )     Hyperammonemia (HCC)     Hyperkeratosis     Hypotension     Hypotension     Intellectual disability     Intellectual disability     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with tonic seizures (HCC)     Lethargy     Liver enzyme elevation     Onychomycosis     Osteoporosis     Osteoporosis     Psychiatric disorder     anxiety    Seizures (HCC)      Past Surgical History  Past Surgical History:   Procedure Laterality Date    CARDIAC PACEMAKER PLACEMENT      JEJUNOSTOMY FEEDING TUBE      PEG TUBE PLACEMENT           Subjective:  Pt sleeping, not appropriate for intervention  Objective/Assessment:  Pt w/ ongoing sz, poor alertness  Most instances is not appropriate for po & assessing for possible aspiration may not be possible in her current state  Spoke w/ MD- continue NPO for now w/ tf only & ongoing oral care for comfort  Pt is currently acutely ill, a VBS once she has returned to her facility & is stable may offer more info & allow her to participate easier  Spoke w/ nsg- continue to keep comfortable w/ oral swabs  Plan/Recommendations:  Continue NPO, oral care at least 4x day  F/u w/ SLP at facility - determine safety of po at that point    If the pt continues w/ random sz activity may need to remain npo w/ tube feed to avoid aspiration possibility  Spoke w/ MD about this & he is in agreement at this time  No further intervention at this time

## 2017-12-06 ENCOUNTER — GENERIC CONVERSION - ENCOUNTER (OUTPATIENT)
Dept: OTHER | Facility: OTHER | Age: 37
End: 2017-12-06

## 2017-12-06 PROBLEM — E87.0 HYPERNATREMIA: Status: ACTIVE | Noted: 2017-12-06

## 2017-12-06 LAB
AMMONIA PLAS-SCNC: 67 UMOL/L (ref 11–35)
ANION GAP SERPL CALCULATED.3IONS-SCNC: 7 MMOL/L (ref 4–13)
BACTERIA BLD CULT: NORMAL
BACTERIA BLD CULT: NORMAL
BUN SERPL-MCNC: 15 MG/DL (ref 5–25)
CALCIUM SERPL-MCNC: 8.9 MG/DL (ref 8.3–10.1)
CHLORIDE SERPL-SCNC: 118 MMOL/L (ref 100–108)
CO2 SERPL-SCNC: 25 MMOL/L (ref 21–32)
CREAT SERPL-MCNC: 0.48 MG/DL (ref 0.6–1.3)
ERYTHROCYTE [DISTWIDTH] IN BLOOD BY AUTOMATED COUNT: 12.5 % (ref 11.6–15.1)
GFR SERPL CREATININE-BSD FRML MDRD: 127 ML/MIN/1.73SQ M
GLUCOSE SERPL-MCNC: 116 MG/DL (ref 65–140)
GLUCOSE SERPL-MCNC: 124 MG/DL (ref 65–140)
GLUCOSE SERPL-MCNC: 138 MG/DL (ref 65–140)
GLUCOSE SERPL-MCNC: 146 MG/DL (ref 65–140)
GLUCOSE SERPL-MCNC: 161 MG/DL (ref 65–140)
HCT VFR BLD AUTO: 36.7 % (ref 34.8–46.1)
HGB BLD-MCNC: 11.8 G/DL (ref 11.5–15.4)
MAGNESIUM SERPL-MCNC: 2.8 MG/DL (ref 1.6–2.6)
MCH RBC QN AUTO: 32.6 PG (ref 26.8–34.3)
MCHC RBC AUTO-ENTMCNC: 32.2 G/DL (ref 31.4–37.4)
MCV RBC AUTO: 101 FL (ref 82–98)
PHOSPHATE SERPL-MCNC: 3.6 MG/DL (ref 2.7–4.5)
PLATELET # BLD AUTO: 184 THOUSANDS/UL (ref 149–390)
PMV BLD AUTO: 12 FL (ref 8.9–12.7)
POTASSIUM SERPL-SCNC: 3.7 MMOL/L (ref 3.5–5.3)
RBC # BLD AUTO: 3.62 MILLION/UL (ref 3.81–5.12)
SODIUM SERPL-SCNC: 150 MMOL/L (ref 136–145)
WBC # BLD AUTO: 9.32 THOUSAND/UL (ref 4.31–10.16)

## 2017-12-06 PROCEDURE — 82140 ASSAY OF AMMONIA: CPT | Performed by: GENERAL PRACTICE

## 2017-12-06 PROCEDURE — 82948 REAGENT STRIP/BLOOD GLUCOSE: CPT

## 2017-12-06 PROCEDURE — 94760 N-INVAS EAR/PLS OXIMETRY 1: CPT

## 2017-12-06 PROCEDURE — 83735 ASSAY OF MAGNESIUM: CPT | Performed by: GENERAL PRACTICE

## 2017-12-06 PROCEDURE — 80048 BASIC METABOLIC PNL TOTAL CA: CPT | Performed by: GENERAL PRACTICE

## 2017-12-06 PROCEDURE — 84100 ASSAY OF PHOSPHORUS: CPT | Performed by: GENERAL PRACTICE

## 2017-12-06 PROCEDURE — 85027 COMPLETE CBC AUTOMATED: CPT | Performed by: GENERAL PRACTICE

## 2017-12-06 RX ADMIN — LEVETIRACETAM 1000 MG: 100 SOLUTION ORAL at 21:32

## 2017-12-06 RX ADMIN — VALPROIC ACID 500 MG: 500 SOLUTION ORAL at 17:25

## 2017-12-06 RX ADMIN — LEVOCARNITINE 1000 MG: 1 SOLUTION ORAL at 08:29

## 2017-12-06 RX ADMIN — Medication 250 MG: at 08:42

## 2017-12-06 RX ADMIN — MELATONIN TAB 3 MG 6 MG: 3 TAB at 21:31

## 2017-12-06 RX ADMIN — LACTULOSE 35 G: 20 SOLUTION ORAL at 21:30

## 2017-12-06 RX ADMIN — Medication 1 TABLET: at 08:42

## 2017-12-06 RX ADMIN — MINERAL OIL AND WHITE PETROLATUM 1 APPLICATION: 150; 830 OINTMENT OPHTHALMIC at 21:33

## 2017-12-06 RX ADMIN — TOPIRAMATE 250 MG: 100 TABLET, FILM COATED ORAL at 17:27

## 2017-12-06 RX ADMIN — RUFINAMIDE 1200 MG: 200 TABLET, FILM COATED ORAL at 17:32

## 2017-12-06 RX ADMIN — CLOBAZAM 5 MG: 10 TABLET ORAL at 08:42

## 2017-12-06 RX ADMIN — LACTULOSE 35 G: 20 SOLUTION ORAL at 17:23

## 2017-12-06 RX ADMIN — VALPROIC ACID 500 MG: 500 SOLUTION ORAL at 08:43

## 2017-12-06 RX ADMIN — MULTIVITAMIN 15 ML: LIQUID ORAL at 08:30

## 2017-12-06 RX ADMIN — VALPROIC ACID 500 MG: 500 SOLUTION ORAL at 02:12

## 2017-12-06 RX ADMIN — MINERAL OIL AND WHITE PETROLATUM 1 APPLICATION: 150; 830 OINTMENT OPHTHALMIC at 17:37

## 2017-12-06 RX ADMIN — MINERAL OIL AND WHITE PETROLATUM 1 APPLICATION: 150; 830 OINTMENT OPHTHALMIC at 08:29

## 2017-12-06 RX ADMIN — LEVETIRACETAM 1000 MG: 100 SOLUTION ORAL at 08:29

## 2017-12-06 RX ADMIN — POTASSIUM CHLORIDE 40 MEQ: 20 SOLUTION ORAL at 08:39

## 2017-12-06 RX ADMIN — Medication 250 MG: at 17:27

## 2017-12-06 RX ADMIN — RUFINAMIDE 1200 MG: 200 TABLET, FILM COATED ORAL at 08:29

## 2017-12-06 RX ADMIN — LEVOCARNITINE 1000 MG: 1 SOLUTION ORAL at 17:30

## 2017-12-06 RX ADMIN — LACTULOSE 35 G: 20 SOLUTION ORAL at 08:39

## 2017-12-06 RX ADMIN — CLOBAZAM 15 MG: 10 TABLET ORAL at 21:31

## 2017-12-06 RX ADMIN — ENOXAPARIN SODIUM 40 MG: 40 INJECTION SUBCUTANEOUS at 08:43

## 2017-12-06 RX ADMIN — TOPIRAMATE 250 MG: 100 TABLET, FILM COATED ORAL at 08:43

## 2017-12-06 RX ADMIN — PERAMPANEL 8 MG: 4 TABLET ORAL at 08:47

## 2017-12-06 NOTE — PROGRESS NOTES
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[facility] )    Underweight    Hyperammonemia (HCC)    HCAP (healthcare-associated pneumonia)    Intellectual disability    Acute respiratory failure ([facility] )    Hypernatremia      Plan:    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[facility] ):  Patient still has persistent seizures on EEG   No visible seizures     Epileptologist following   Antiepileptics as per their recommendation  Patient back at baseline  Neuro recommends ketogenic diet - LTC can start Glucerna 1 2 @50/hr        Possible aspiration pneumonitis POA:   Possibly due to seizures  Chest x-ray showing right lower lobe opacity  Patient has been on vanc and Zosyn  Antibiotics stopped 12/4 since she had not had any more fever and her white count is also normal   Monitor off antibiotics   If she spikes fever again will start vanc and Zosyn     Acute hypoxic respiratory failure:  Due to above  Resolved for now  Patient currently on room air      Dysphagia:  Patient currently on tube feedings at home  Will keep NPO as pt high risk for aspiration and gets all her nutrition from TF     Intellectual disability:  Due to 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   Toxic metabolic encephalopathy:  Likely 2/2 high ammonia and AEDs  Pt at baseline     Underweight:  TF    HyperNa:  Increased free water in TF  Recheck BMP tomorrow      VTE Pharmacologic Prophylaxis:   Pharmacologic: Enoxaparin (Lovenox)  Mechanical VTE Prophylaxis in Place: Yes    Patient Centered Rounds: I have performed bedside rounds with nursing staff today  Discussions with Specialists or Other Care Team Provider: neuro    Education and Discussions with Family / Patient: no    Time Spent for Care: 30 minutes    More than 50% of total time spent on counseling and coordination of care as described above  Current Length of Stay: 5 day(s)    Current Patient Status: Inpatient   Certification Statement: The patient will continue to require additional inpatient hospital stay due to need to monitor Na    Discharge Plan / Estimated Discharge Date: tomorrow to [facility]    Code Status: Level 1 - Full Code      Subjective:   Pt seen and examined  Awake today  No o/n issues  Objective:     Vitals:   Temp (24hrs), Av 6 °F (36 4 °C), Min:97 4 °F (36 3 °C), Max:98 °F (36 7 °C)    HR:  [74-90] 74  Resp:  [16] 16  BP: ()/(57-74) 101/74  SpO2:  [96 %-98 %] 98 %  Body mass index is 18 75 kg/m²  Input and Output Summary (last 24 hours): Intake/Output Summary (Last 24 hours) at 17 1420  Last data filed at 17 1806   Gross per 24 hour   Intake                0 ml   Output                0 ml   Net                0 ml       Physical Exam:     Physical Exam   Constitutional:   malnourished   HENT:   Head: Normocephalic and atraumatic  Eyes: Conjunctivae and EOM are normal    Neck: Normal range of motion  Neck supple  Cardiovascular: Normal rate and regular rhythm  Pulmonary/Chest: Effort normal and breath sounds normal    Abdominal: Soft  Bowel sounds are normal  She exhibits no distension  There is no tenderness  Musculoskeletal: Normal range of motion  She exhibits no edema  Neurological: She is alert  Skin: Skin is warm and dry  Additional Data:     Labs:      Results from last 7 days  Lab Units 17  0501  17  0433   WBC Thousand/uL 9 32  < > 13 81*   HEMOGLOBIN g/dL 11 8  < > 11 8   HEMATOCRIT % 36 7  < > 38 0   PLATELETS Thousands/uL 184  < > 178   NEUTROS PCT %  --   --  75   LYMPHS PCT %  --   --  17   MONOS PCT %  --   --  8   EOS PCT %  --   --  0   < > = values in this interval not displayed      Results from last 7 days  Lab Units 17  0501  17  0513   SODIUM mmol/L 150*  < > 139   POTASSIUM mmol/L 3 7  < > 3 6   CHLORIDE mmol/L 118*  < > 111*   CO2 mmol/L 25  < > 20*   BUN mg/dL 15  < > 6   CREATININE mg/dL 0 48*  < > 0 53*   CALCIUM mg/dL 8 9  < > 8 2*   TOTAL PROTEIN g/dL  --   --  6 8   BILIRUBIN TOTAL mg/dL  --   --  0 61   ALK PHOS U/L  --   --  81   ALT U/L  --   --  27   AST U/L  --   --  35   GLUCOSE RANDOM mg/dL 116  < > 339*   < > = values in this interval not displayed  Results from last 7 days  Lab Units 11/30/17  2329   INR  1 10       * I Have Reviewed All Lab Data Listed Above  * Additional Pertinent Lab Tests Reviewed: Maddie 66 Admission Reviewed        Recent Cultures (last 7 days):       Results from last 7 days  Lab Units 12/01/17  0048 11/30/17 2329   BLOOD CULTURE  No Growth After 5 Days  No Growth After 5 Days  Last 24 Hours Medication List:     artificial tear 1 application Ophthalmic TID   cloBAZam 15 mg Per G Tube HS   cloBAZam 5 mg Oral Daily   enoxaparin 40 mg Subcutaneous Daily   lactulose 35 g Per G Tube TID   levETIRAcetam 1,000 mg Per G Tube Q12H [address]   levOCARNitine 1,000 mg Per G Tube BID With Meals   melatonin 6 mg Per G Tube HS   multivitamin with iron-minerals 15 mL Per G Tube Daily   Perampanel 8 mg Per G Tube Daily   potassium chloride 40 mEq Per G Tube Daily   rufinamide 1,200 mg Per G Tube BID   saccharomyces boulardii 250 mg Per G Tube BID   senna-docusate sodium 1 tablet Per G Tube Daily   topiramate 250 mg Per G Tube BID   Valproic Acid 500 mg Per G Tube Q8H        Today, Patient Was Seen By: Vance Dalton DO    ** Please Note: This note has been constructed using a voice recognition system   **

## 2017-12-06 NOTE — PROGRESS NOTES
Epilepsy Consult Follow-up Note  Patient Name: Angeline Peres  MRN: [id number]  Date: 12/06/17 Time: 6:07 PM     LOS: 5 days     Interval History:  Still frequent seizures on EEG, but improved both in frequency and intensity over last 2 days  EEG monitoring discontinued overnight after she pulled electrodes off  Much more awake today  Trying to sit up in bed  Pulls away from minimal tactile stimulation and makes noises in response to stimulation  Additional history:   Keppra level drawn at time of admission on 12/1  Spoke to nurse at patient's facility on 12/4/17  He recalls giving morning dose of levetiracetam on 80/22, but is not certain whether evening dose was given  Nursing notes describe alert on 11/30 at 21:00 and then she was sent out with EMS at 23:00  It's possible evening levetiracetam was missed if the nurse was running a bit behind  It is not clear from the nursing notes if aspiration was due to seizure  Medications     artificial tear 1 application Ophthalmic TID   cloBAZam 15 mg Per G Tube HS   cloBAZam 5 mg Oral Daily   enoxaparin 40 mg Subcutaneous Daily   lactulose 35 g Per G Tube TID   levETIRAcetam 1,000 mg Per G Tube Q12H [facility]   levOCARNitine 1,000 mg Per G Tube BID With Meals   melatonin 6 mg Per G Tube HS   multivitamin with iron-minerals 15 mL Per G Tube Daily   Perampanel 8 mg Per G Tube Daily   potassium chloride 40 mEq Per G Tube Daily   rufinamide 1,200 mg Per G Tube BID   saccharomyces boulardii 250 mg Per G Tube BID   senna-docusate sodium 1 tablet Per G Tube Daily   topiramate 250 mg Per G Tube BID   Valproic Acid 500 mg Per G Tube Q8H       Continuous Infusions:   PRN Meds:  acetaminophen    ondansetron     Physical Exam   HR:  [74-90] 84  Resp:  [16] 16  BP: ()/(58-74) 94/67  SpO2:  [97 %-99 %] 99 %  Abdomen: soft  Does grimace some to moderate abdominal palpation  Neurologic Exam  Awake and restless   Repeatedly trying to sit up in bed, removes her blanket when it is placed on her  Does not follow commands  Makes grunting type noises in response to minimal tactile stimulation and pulls away  Gaze is conjugate  Occasional blink to threat  Normal tone  At least 4/5 t/o  Toes downgoing with plantar stimulation  No clinical seizures while I was in the room  Test Results:  VEEG Results the last 24 hrs: Please see separate report  VPA 56 --> 108 --> 97 --> 91  Ammonia 79 --> 48 --> 120 --> 67    Impression and Plan:  Jannie Varghese is a 40 y o  female with Lennox Gastaut Syndrome, intractable seizures, remote anoxic brain injury, prior status epilepticus and severe intellectual disability admitted for recurrent seizures and decreased oxygen saturations  Her course has been complicated by aspiration pneumonia  Seizures continued on EEG, but improved over last 2 5 days  Continue to occur multiple times per hour, primarily during sleep (as is her typical pattern)  EEG discontinued  She has clinically improved  -- Continue Rufinamide 1200 mg bid, Depakote 500 mg three times a day, Keppra 1000 mg twice a day, Onfi 5 mg in the morning/15 mg at night, Fycompa 8 mg daily, and Topiramate 250 mg twice a day  -- Check valproate level in AM (order placed)  -- Consultation placed to nutrition for guidance on decreasing carbohydrate in tube feeds to work toward ketogenic diet to improve seizures in the long-term  Encephalopathy and increased somnolence is multifactorial (recent infection, polypharmacy, frequent seizures, elevated ammonia) in setting of baseline severe intellectual disability  Much more awake today - in fact I have never seen her this awake during the day  Nursing reports seeing her this awake at night during prior admissions  Disposition: OK to discharge from a neurological perspective  Consider return to her facility tomorrow or Friday  Total floor time spent today 30 minutes  More than 50% of time with patient       Noemi Jolly MD Date: 12/6/2017 Time: 6:07 PM

## 2017-12-06 NOTE — PROCEDURES
Continuous Video EEG Monitoring       Patient Name:  Pam Rey  MRN: [id number]   :  1980 File #: [id number]   Age: 40 y o  Encounter #: [id number]          Report date: 2017          Study type: Continuous video EEG    ICD 10 diagnosis: Generalized epilepsy intractable with status G40 311    Start time: 12/3/2017: 08:00 End time: 2017: 07:59     -------------------------------------------------------------------------------------------------------------------   Patient History: This recording was observed in a 40 y o  female with Ronnie Lights Syndrome with intractable seizures to determine frequency of seizures and guide therapy  Medications include:     artificial tear 1 application Ophthalmic TID   cloBAZam 15 mg Per G Tube HS   cloBAZam 5 mg Oral Daily   enoxaparin 40 mg Subcutaneous Daily   lactulose 35 g Per G Tube TID   levETIRAcetam 1,000 mg Per G Tube Q12H [address]   levOCARNitine 1,000 mg Per G Tube BID With Meals   melatonin 6 mg Per G Tube HS   multivitamin with iron-minerals 15 mL Per G Tube Daily   Perampanel 8 mg Per G Tube Daily   potassium chloride 40 mEq Per G Tube Daily   rufinamide 1,200 mg Per G Tube BID   saccharomyces boulardii 250 mg Per G Tube BID   senna-docusate sodium 1 tablet Per G Tube Daily   topiramate 250 mg Per G Tube BID   Valproic Acid 500 mg Per G Tube Q8H       -------------------------------------------------------------------------------------------------------------------   Description of Procedure:  · 32 channel digital recording with electrodes placed according to the International 10-20 system with additional T1/T2 electrodes, EOG, EKG, and simultaneous video  A monitoring technologist supervised the continuous recording  The recording was technically satisfactory      -------------------------------------------------------------------------------------------------------------------   Results:   Manual Review:   Manual review of the tracing was essentially unchanged from the prior day's recording  No age or state appropriate activities were seen  Instead, background activities consisted of reactive, generalized, poorly organized, predominantly theta and delta activities with mixed overriding faster activities  There were frequent  independent right and left temporal spike wave discharges as well as generalized spike wave discharges  These generalized spike wave discharges occasionally occurred in runs of generalized slow spike and wave discharges  There were very frequent generalized tonic and generalized tonic clonic seizures seen over the course of the tracing, as detailed below  During periods of relative wakefulness on EEG, there were long runs of centrally dominant, low amplitude 5-6 cps theta activities  Other findings: The single lead ECG demonstrated a regular rhythm  Events:   No push buttons were activated  Although no push buttons were activated, a total of 55 seizures were captured over the course of the tracing, as below  Seizures again clinically manifested with initial eye opening, occasionally followed by brief tonic stiffening of arms  No tonic clonic seizures were seen and seizures were briefer than the prior day's recording  See below for hourly breakdown of seizures  These seizures again occurred almost exclusively out of sleep and typically lasted around 30-40 seconds  All of these seizures were electrographically similar, starting with a burst of generalized slow spike wave discharges at 2-2 5 cps that was followed by generalized attenuation of activity and low amplitude rhythmic, generalized beta activity that gradually increased in frequency and amplitude to be rhythmic alpha activity before abruptly attenuating      08:00-09:00: 10 seizures total  (5 tonic, and 5 with subtle or no clinical change)  09:00-10:00: 9 seizures total  (1 tonic, and 8 with subtle or no clinical change)  10:00-11:00: 4 seizures total  (4 tonic)  11:00-12:00: 3 seizures total  (2 tonic, and 1 with subtle or no clinical change)  12:00-13:00: 3 seizures total  (2 tonic, and 1 with subtle or no clinical change)  13:00-17:00: 0 seizures  17:00- 18:00: 1 tonic seizure  18:00 - 23:00: 0 seizures total    23:00 - 00:00: 1 tonic seizure  00:00 - 01:00: 2 seizures total  (2 with subtle or no clinical change)  01:00 - 02:00: 0 seizures  02:00-03:00: 1 seizure with subtle clinical change  03:00- 04:00: 2 seizures total  (2 with subtle or no clinical change)  04:00-05:00: 6 seizures total  (5 tonic, and 1 with subtle or no clinical change)   05:00-06:00: 4 seizures total  (4 tonic)  06:00-07:00: 8 seizures total  (8 tonic)  07:00-08:00: 1 tonic seizure    -------------------------------------------------------------------------------------------------------------------   Interpretation: This prolonged, continuous video-EEG recording captured a total of 55 generalized tonic seizures, seizures with only subtle clinical changes, or no clinical change  Background activities are disorganized and too slow suggesting moderate to severe diffuse bilateral cerebral dysfunction  Presence of background slowing, generalized spike wave discharges, multifocal spike wave discharges and runs of slow spike wave discharges are consistent with patient's known diagnosis of Lennox Gastaut syndrome  Further monitoring is suggested to help evaluate progress in treatment        Rashida Jacobs MD   [address] [facility] [place]

## 2017-12-06 NOTE — SOCIAL WORK
CM was informed that pt is medically stable for dc tomorrow  CM informed Sienna at [facility] of same--Sienna states staff is in place for pt to return tomorrow  CM arranged with [facility] for a 12pm dc to [place] on 12/7  CM notified Dr Alyx Kinney at [facility], and pts bedside RN Bubba España, and pts parents of dc time  Facility transfer form and CMN completed  Chart copy requested

## 2017-12-06 NOTE — NUTRITION
12/06/17 6039   Recommendations/Interventions   Interventions EN continue as ordered  (Spoke with MD - the only lower CHO tf is Glucerna 1 2 but not on formulary - must obtain from [address] by [address] [facility]  MD will put recommendation on pt's transfer order for SNF to try at the facility  That provides 66gms less of CHO daily total 137gms;60 gms less)   Nutrition Recommendations Tube Feeding Recommendation provided  (For a lower CHO TF per MD request toward Ketogenic Diet consider Glucerna 1 2 goal same as current rate 50ml/hr with 125ml free h20 flushes  Q4hrs  = 1440 kcal with 72gms pro and 137gm CHO; 72gms Fat )

## 2017-12-07 VITALS
WEIGHT: 102.95 LBS | RESPIRATION RATE: 18 BRPM | BODY MASS INDEX: 18.95 KG/M2 | HEIGHT: 62 IN | HEART RATE: 71 BPM | DIASTOLIC BLOOD PRESSURE: 65 MMHG | TEMPERATURE: 97.5 F | SYSTOLIC BLOOD PRESSURE: 114 MMHG | OXYGEN SATURATION: 99 %

## 2017-12-07 PROBLEM — J96.00 ACUTE RESPIRATORY FAILURE (HCC): Status: RESOLVED | Noted: 2017-12-02 | Resolved: 2017-12-07

## 2017-12-07 LAB
ANION GAP SERPL CALCULATED.3IONS-SCNC: 6 MMOL/L (ref 4–13)
BUN SERPL-MCNC: 19 MG/DL (ref 5–25)
CALCIUM SERPL-MCNC: 9.3 MG/DL (ref 8.3–10.1)
CHLORIDE SERPL-SCNC: 116 MMOL/L (ref 100–108)
CO2 SERPL-SCNC: 26 MMOL/L (ref 21–32)
CREAT SERPL-MCNC: 0.46 MG/DL (ref 0.6–1.3)
GFR SERPL CREATININE-BSD FRML MDRD: 128 ML/MIN/1.73SQ M
GLUCOSE SERPL-MCNC: 108 MG/DL (ref 65–140)
GLUCOSE SERPL-MCNC: 137 MG/DL (ref 65–140)
POTASSIUM SERPL-SCNC: 3.8 MMOL/L (ref 3.5–5.3)
SODIUM SERPL-SCNC: 148 MMOL/L (ref 136–145)
VALPROATE SERPL-MCNC: 76 UG/ML (ref 50–100)

## 2017-12-07 PROCEDURE — 80164 ASSAY DIPROPYLACETIC ACD TOT: CPT | Performed by: PSYCHIATRY & NEUROLOGY

## 2017-12-07 PROCEDURE — 82948 REAGENT STRIP/BLOOD GLUCOSE: CPT

## 2017-12-07 PROCEDURE — 80048 BASIC METABOLIC PNL TOTAL CA: CPT | Performed by: GENERAL PRACTICE

## 2017-12-07 PROCEDURE — 94762 N-INVAS EAR/PLS OXIMTRY CONT: CPT

## 2017-12-07 RX ORDER — LEVOCARNITINE 1 G/10ML
1000 SOLUTION ORAL 2 TIMES DAILY WITH MEALS
Qty: 118 ML | Refills: 0
Start: 2017-12-07 | End: 2018-04-23 | Stop reason: SDUPTHER

## 2017-12-07 RX ORDER — VALPROIC ACID 250 MG/5ML
500 SOLUTION ORAL EVERY 8 HOURS
Refills: 0
Start: 2017-12-07 | End: 2018-04-23 | Stop reason: SDUPTHER

## 2017-12-07 RX ORDER — RUFINAMIDE 400 MG/1
1200 TABLET, FILM COATED ORAL 2 TIMES DAILY
Refills: 0
Start: 2017-12-07 | End: 2018-04-23 | Stop reason: SDUPTHER

## 2017-12-07 RX ADMIN — POTASSIUM CHLORIDE 40 MEQ: 20 SOLUTION ORAL at 10:04

## 2017-12-07 RX ADMIN — MINERAL OIL AND WHITE PETROLATUM 1 APPLICATION: 150; 830 OINTMENT OPHTHALMIC at 10:21

## 2017-12-07 RX ADMIN — Medication 1 TABLET: at 10:03

## 2017-12-07 RX ADMIN — VALPROIC ACID 500 MG: 500 SOLUTION ORAL at 00:09

## 2017-12-07 RX ADMIN — LACTULOSE 35 G: 20 SOLUTION ORAL at 10:04

## 2017-12-07 RX ADMIN — Medication 250 MG: at 10:02

## 2017-12-07 RX ADMIN — TOPIRAMATE 250 MG: 100 TABLET, FILM COATED ORAL at 10:02

## 2017-12-07 RX ADMIN — LEVOCARNITINE 1000 MG: 1 SOLUTION ORAL at 07:45

## 2017-12-07 RX ADMIN — VALPROIC ACID 500 MG: 500 SOLUTION ORAL at 10:04

## 2017-12-07 RX ADMIN — RUFINAMIDE 1200 MG: 200 TABLET, FILM COATED ORAL at 10:03

## 2017-12-07 RX ADMIN — LEVETIRACETAM 1000 MG: 100 SOLUTION ORAL at 10:04

## 2017-12-07 RX ADMIN — CLOBAZAM 5 MG: 10 TABLET ORAL at 10:02

## 2017-12-07 RX ADMIN — PERAMPANEL 8 MG: 4 TABLET ORAL at 10:24

## 2017-12-07 RX ADMIN — ENOXAPARIN SODIUM 40 MG: 40 INJECTION SUBCUTANEOUS at 10:04

## 2017-12-07 RX ADMIN — MULTIVITAMIN 15 ML: LIQUID ORAL at 10:04

## 2017-12-07 NOTE — PROCEDURES
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 to determine frequency of seizures and guide therapy  Medications include:     artificial tear 1 application Ophthalmic TID   cloBAZam 15 mg Per G Tube HS   cloBAZam 5 mg Oral Daily   enoxaparin 40 mg Subcutaneous Daily   lactulose 35 g Per G Tube TID   levETIRAcetam 1,000 mg Per G Tube Q12H [address]   levOCARNitine 1,000 mg Per G Tube BID With Meals   melatonin 6 mg Per G Tube HS   multivitamin with iron-minerals 15 mL Per G Tube Daily   Perampanel 8 mg Per G Tube Daily   potassium chloride 40 mEq Per G Tube Daily   rufinamide 1,200 mg Per G Tube BID   saccharomyces boulardii 250 mg Per G Tube BID   senna-docusate sodium 1 tablet Per G Tube Daily   topiramate 250 mg Per G Tube BID   Valproic Acid 500 mg Per G Tube Q8H       -------------------------------------------------  Description of Procedure:  32 channel digital recording with electrodes placed according to the International 10-20 system with additional T1/T2 electrodes, EOG, EKG, and simultaneous video  A monitoring technologist supervised the continuous recording  The recording was technically satisfactory  -------------------------------------------------  Results:   Manual Review:   No age or state appropriate activities were seen  Instead, background activities consisted of reactive, diffuse, poorly organized, low to medium amplitude theta/delta activity with intermixed lower amplitude alpha/beta activities   At times there anteriorly predominant low to medium amplitude activities near 4 cps  At times there was diffuse relative suppression with very low amplitude beta activities  During sleep the background often contained diffuse low to medium amplitude, mixed frequency 2-4 cps activities  Features of stage 2 sleep were not present  There were significant periods of wakefulness  At times with stimulation and relative arousal there was relative attenuation of delta activities  There were frequent generalized spike and polyspike wave discharges that at times occurred in brief bursts near 2 cps  There were frequent independent left and right temporal spikes  There were tonic seizures, at times with brief clonic component, as described below  Other findings:  Samples of the single channel ECG demonstrated a regular rhythm  Events/Seizures: There were 29 seizures detected by manual review (supplemented by automatic detection) during this study  Seizures last from about 10 seconds to 25 seconds in duration  Most seizures involve eye opening, many seizures involved tonic stiffening of the arms (often L > R) and at times during longer seizures there were brief mild clonic jerks of the arms  Most seizures appeared to start out of sleep, but some seizures occurred during wakefulness  Electrographically the seizures consisted of diffuse attenuation with emergence of low amplitude rhythmic beta activity and/or medium to high amplitude rhythmic spike wave discharges near 2 cps  All seizures were reviewed on video had clinical correlate  Hour Seizures per hour   8 0   9 0   10 7   11 7   12 1   13 6   14 1   15 1   16 2   17 4   Grand Total 29     The study was largely obscured by artifact after the patient removes multiple electrodes near 18:00     -------------------------------------------------  Interpretation:    This prolonged, continuous video-EEG recording is abnormal      29 electrographic seizures, lasting about 10 seconds to 25 seconds in duration are captured during 10 hours of this study  All seizures reviewed on video had clinical correlate that was often subtle  Most seizures involve eye opening, many seizures involve relatively mild tonic stiffening of the arms (often L > R) and at times during longer seizures there are brief mild clonic jerks of the arms  Most seizures occur out of apparent sleep, but some seizures occur during wakefulness  Electrographic characteristics are consistent with seizures captured during prior monitoring periods  A background of diffuse, poorly organized theta and intermittent delta activities suggests moderate nonspecific diffuse cerebral dysfunction  Frequent generalized epileptiform discharges as well as independent left and right temporal epileptiform discharges indicate underlying epileptogenic potential and are consistent with the patient's diagnosis of Lennox Gastaut syndrome  The patient was awake and active during much of this study       Jaki Castrejon MD   [address] [facility] Neurology [facility]

## 2017-12-07 NOTE — PLAN OF CARE
DISCHARGE PLANNING - CARE MANAGEMENT     Discharge to post-acute care or home with appropriate resources Adequate for Discharge        NEUROSENSORY - ADULT     Achieves stable or improved neurological status Adequate for Discharge     Absence of seizures Adequate for Discharge     Remains free of injury related to seizures activity Adequate for Discharge     Achieves maximal functionality and self care Adequate for Discharge        Nutrition/Hydration-ADULT     Nutrient/Hydration intake appropriate for improving, restoring or maintaining nutritional needs Adequate for Discharge        Potential for Falls     Patient will remain free of falls Adequate for Discharge        Prexisting or High Potential for Compromised Skin Integrity     Skin integrity is maintained or improved Adequate for Discharge        SKIN/TISSUE INTEGRITY - ADULT     Skin integrity remains intact Adequate for Discharge

## 2017-12-07 NOTE — DISCHARGE INSTRUCTIONS
Please check BMP in 1-2 days  If sodium >147, would increase free water  Also, would strongly consider ketogenic diet with [email or web address] with free H2O flushes 125 mL every 4 hours  Keep NPO  Recurrent Seizures in Adults   WHAT YOU NEED TO KNOW:   A seizure is an episode of abnormal brain activity  A seizure can cause jerky muscle movements, loss of consciousness, or confusion  Recurrent means you have a seizure more than once  The cause of your seizures may not be known  Recurrent seizures may occur if you do not take antiseizure medicine as directed  Some common triggers are alcohol, drugs, lack of sleep, fever, or a virus  High or low blood sugar levels can also trigger a seizure  DISCHARGE INSTRUCTIONS:   Call 911 or have someone else call for any of the following:   · Your seizure lasts longer than 5 minutes  · You have a second seizure within 24 hours of your first     · You have trouble breathing after a seizure  · You cannot be woken after your seizure  · You have more than 1 seizure before you are fully awake or aware  · You have diabetes or are pregnant and have a seizure  · You have a seizure in water  Seek care immediately if:   · You are injured during a seizure  Contact your healthcare provider if:   · You have a fever  · You are planning to get pregnant or are currently pregnant  · You have questions or concerns about your condition or care  Medicines:   · Antiepileptic  medicine may be given to control or prevent seizures  Do not  stop taking this medicine without the direction of a healthcare provider  · Take your medicine as directed  Contact your healthcare provider if you think your medicine is not helping or if you have side effects  Tell him of her if you are allergic to any medicine  Keep a list of the medicines, vitamins, and herbs you take  Include the amounts, and when and why you take them  Bring the list or the pill bottles to follow-up visits  Carry your medicine list with you in case of an emergency  Prevent another seizure:   · Take your antiseizure medicine every day at the same time  This will also help reduce side effects  Do not skip any doses  Do not stop taking this medicine unless directed by a healthcare provider  · Manage stress  Stress can trigger a seizure  Exercise can help you reduce stress  Talk to your healthcare provider about exercise that is safe for you  Other ways to manage stress include yoga, meditation, and biofeedback  Illness can be a form of stress  Eat a variety of healthy foods and drink plenty of liquids during an illness  · Set a regular sleep schedule  A lack of sleep can trigger a seizure  Try to go to sleep and wake up at the same times every day  Keep your bedroom quiet and dark  Talk to your healthcare provider if you are having trouble sleeping  · Manage other medical conditions  Manage other health conditions that may increase your risk for a seizure  Keep your blood sugar levels and blood pressure under control  · Limit or do not drink alcohol as directed  Alcohol can trigger a seizure, especially if you drink a large amount at one time  A drink of alcohol is 12 ounces of beer, 1½ ounces of liquor, or 5 ounces of wine  Talk to your healthcare provider about a safe amount of alcohol for you  Your provider may recommend that you do not drink any alcohol  Tell him or her if you need help to quit drinking  Manage recurrent seizures:   · Ask what safety precautions you should take  Talk with your healthcare provider about driving  You may not be able to drive until you are seizure-free for a period of time  You will need to check the law where you live  Also talk to your healthcare provider about swimming and bathing  You may drown or develop life-threatening heart or lung damage if you have a seizure in water  · Tell your friends, family members, and coworkers that you had a seizure    Give them the following instructions to use if you have another seizure:     ¨ Do not panic  ¨ Gently guide me to the floor or a soft surface  ¨ Do not hold me down or put anything in my mouth  ¨ Place me on my side to help prevent me from swallowing saliva or vomit  ¨ Protect me from injury  Remove sharp or hard objects from the area surrounding me, or cushion my head  ¨ Loosen the clothing around my head and neck  ¨ Time how long my seizure lasts  Call 911 if my seizure lasts longer than 5 minutes or if I have a second seizure  ¨ Stay with me until my seizure ends  Let me rest until I am fully awake  ¨ Perform CPR if I stop breathing or you cannot feel my pulse  ¨ Do not give me anything to eat or drink until I am fully awake  Follow up with your healthcare provider or neurologist as directed: You may need more tests to find the cause of your seizure  You may also need tests to check the level of antiseizure medicine in your blood  Your neurologist may need to change or adjust your medicine  Write down your questions so you remember to ask them during your visits  © 2017 [facility] Information is for End User's use only and may not be sold, redistributed or otherwise used for commercial purposes  All illustrations and images included in CareNotes® are the copyrighted property of [employer]  or [address]  The above information is an  only  It is not intended as medical advice for individual conditions or treatments  Talk to your doctor, nurse or pharmacist before following any medical regimen to see if it is safe and effective for you

## 2017-12-08 LAB
LEVETIRACETAM SERPL-MCNC: 19.1 UG/ML (ref 10–40)
VALPROATE FREE SERPL-MCNC: 19.3 UG/ML (ref 6–22)

## 2017-12-08 NOTE — DISCHARGE SUMMARY
Discharge Summary - [facility] Internal Medicine    Patient Information: Isidro Mena 40 y o  female MRN: [id number]  Unit/Bed#: [facility] 735-01 Encounter: [id number]    Discharging Physician / Practitioner: Otf Nagy DO  PCP: Matt Olsen DO  Admission Date: 12/1/2017  Discharge Date: 12/07/17    Reason for Admission: seizure    Discharge Diagnoses:     Principal Problem:    Seizure [facility])  Active Problems:    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[facility] [address] )    Underweight    Hyperammonemia ([facility] [address] )    HCAP (healthcare-associated pneumonia)    Intellectual disability    Hypernatremia  Resolved Problems:    Acute respiratory failure ([facility] )      Consultations During Hospital Stay:  · Dr Ashley Butcher - epileptology    Procedures Performed:     · VEEG: Frequent epileptogenic potential consistent w/ Jeffrey Lay syndrome    Significant Findings / Test Results:     · CXR: RUL opacity    Incidental Findings:   · none     Test Results Pending at Discharge (will require follow up):   · none     Outpatient Tests Requested:  · BMP in 1-2 days    Complications:  Pt had acute hypoxic resp fail 2/2 asp pna requiring transfer to ICU    Hospital Course:     Isidro Mena is a 40 y o  female patient who originally presented to the hospital on 12/1/2017 due to seizure episodes  Meds were adjusted by epileptology  At d/c, pt back to baseline  Recommend to LTC that they start keotgenic diet with Glucerna 1 2 @ 50 mL/hour to prevent seizures  Possible aspiration pneumonitis POA:   Possibly due to seizures  Chest x-ray showing right lower lobe opacity  Patient has been on vanc and Zosyn    Antibiotics started 12/1 and stopped 12/4 since she had not had any more fever and her white count was also normal   Remained afebrile off abx     Acute hypoxic respiratory failure:  Due to above  On RA at d/c     Dysphagia:  Due to aspiration penumonitis, decision made to keep pt NPO  Got all nutrition from TF     Intellectual disability:  Due to 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   Toxic metabolic encephalopathy:  Likely 2/2 high ammonia and AEDs  AEDs adjusted  Pt at baseline at d/c     Underweight:  Will get all nutrition from TF     HyperNa:  Increased free water on 12/6 in TF  Na trending down at d/c    Condition at Discharge: stable     Discharge Day Visit / Exam:     Subjective:  Pt seen and examined  No o/n issues  Vitals: Blood Pressure: 114/65 (12/06/17 2326)  Pulse: 71 (12/06/17 2326)  Temperature: 97 5 °F (36 4 °C) (12/06/17 2326)  Temp Source: Axillary (12/06/17 2326)  Respirations: 18 (12/06/17 2326)  Height: 5' 2" (157 5 cm) (12/01/17 0800)  Weight - Scale: 46 7 kg (102 lb 15 3 oz) (12/07/17 0600)  SpO2: 99 % (12/07/17 0738)  Exam:   Physical Exam   Constitutional:   malnourished   HENT:   Head: Normocephalic and atraumatic  Eyes: Conjunctivae and EOM are normal    Neck: Normal range of motion  Neck supple  Cardiovascular: Normal rate and regular rhythm  Pulmonary/Chest: Effort normal and breath sounds normal    Abdominal: Soft  Bowel sounds are normal  She exhibits no distension  There is no tenderness  Musculoskeletal: Normal range of motion  She exhibits no edema  Neurological: She is alert  Skin: Skin is warm and dry  Discussion with Family: no    Discharge instructions/Information to patient and family:   See after visit summary for information provided to patient and family  Provisions for Follow-Up Care:  See after visit summary for information related to follow-up care and any pertinent home health orders  Disposition:     Long Term Acute Care (LTAC) Facility at [address] [place] [address] to [facility] SNF:   · Not Applicable to this Patient - Not Applicable to this Patient    Planned Readmission: no     Discharge Statement:  I spent 35 minutes discharging the patient  This time was spent on the day of discharge  I had direct contact with the patient on the day of discharge   Greater than 50% of the total time was spent examining patient, answering all patient questions, arranging and discussing plan of care with patient as well as directly providing post-discharge instructions  Additional time then spent on discharge activities  Discharge Medications:  See after visit summary for reconciled discharge medications provided to patient and family        ** Please Note: This note has been constructed using a voice recognition system **

## 2018-01-10 NOTE — PROCEDURES
Procedures by Audra Garcia MD at 2017  12:36 PM      Author:  Audra Garcia MD Service:  Neurology Author Type:  Physician    Filed:  2017 12:59 PM Date of Service:  2017 12:36 PM Status:  Signed    :  Audra Garcia MD (Physician)        Procedure Orders:       1  EEG Video Monitoring 24 Hour [id number] ordered by Amol Rizvi PA-C at 17 1538                  Continuous Video EEG Monitoring       Patient Name:  Robert Centeno  MRN: [id number]   :  1980 File #: [id number]   Age: 40 y o  Encounter #: [id number]   Start Time: 2017 21:31 End Time: 2017 08:00        Report date: 2017          Study type: Continuous video EEG    ICD 10 diagnosis: Generalized epilepsy intractable with status G40 311 Lennox Gastaut syndrome      -------------------------------------------------------------------------------------------------------------------   Patient History: This recording was observed in a 40 y  o parfemale with Albesa Grand Syndrome and frequent tonic seizures to evaluate for interval change in seizure burden that may contribute to excessive  sedation  Medications include:   artificial tear 1 application Ophthalmic TID   cloBAZam 20 mg Per NG Tube HS   enoxaparin 40 mg Subcutaneous Q24H   lactulose 30 g Oral TID   levETIRAcetam 1,000 mg Per G Tube Q12H [address]   levOCARNitine 500 mg Per G Tube TID   melatonin 6 mg Oral HS   Perampanel 8 mg Oral Daily   rufinamide 1,600 mg Per G Tube BID   senna-docusate sodium 1 tablet Per G Tube Daily   topiramate 250 mg Per G Tube BID   Valproic Acid 250 mg Per G Tube Q8H       -------------------------------------------------------------------------------------------------------------------   Description of Procedure:  32 channel digital recording with electrodes placed according to the International 10-20 system with additional T1/T2 electrodes,  EOG, EKG, and simultaneous video   A monitoring technologist supervised the continuous recording  The recording was technically satisfactory  -------------------------------------------------------------------------------------------------------------------   Results:   Manual Review:   During wakefulness there were was no definite posteriorly dominant rhythm that attenuated with eye opening  Instead, there were diffuse low amplitude theta activities often near 5  cps, periods of relative suppression with very low amplitude mixed activities  During sleep better regulated 5 cps theta activities attenuated and there were very low to low amplitude poorly regulated mixed frequency theta/alpha activities with intermittent  delta and frequent spike/polyspike wave discharges       There were frequent bursts of arrhythmic generalized spike wave discharges that at times showed shifting lateralization  There were independent left and right temporal spikes  There were bursts of 2-2 5 cps rhythmic generalized discharges that at times  correlated with mild eyelid myoclonia       The patient was asleep during the majority of this study  At times during sleep there were intermittent poorly regulated low to medium amplitude delta activities near 1-2 cps with overriding very low amplitude mixed frequency theta/alpha and beta  During  other epochs there were diffuse low amplitude beta activity with intermittent mild bursts of mixed frequency theta/delta  Other findings:  Samples of the single channel ECG demonstrated a regular rhythm  Events:   Seizures involved diffuse medium to high amplitude paroxysmal fast activity and/or rhythmic diffuse spike wave discharges typically lasted between 3 and 15 seconds, with longer events  demonstrating evolution in frequency  Events lasting longer than 5 seconds or more often had often had mild clinical correlate of eye opening and mild posturing of the R more than L UE   The seizures were relatively accurately detected by automated seizure detection Oleg Mao)  parSome events lasted up to 30 seconds  The patient was asleep during most of the study and most seizures occurred during sleep  Seizure detections are noted below  Detection occurring within one minute of another detection are considered  one event  Hour Seizures per Hour   22 4 (30 min)   23 16   0 26   1 14   2 15   3 24   4 16   5 19   6 23   7 21   Grand Total 178     -------------------------------------------------------------------------------------------------------------------   Interpretation: This prolonged, continuous video-EEG recording is abnormal       Background disorganization, theta frequency slowing and frequent generalized as well as independent left and right temporal epileptiform discharges are consistent with the diagnosis of Lennox Gastaut syndrome       During this 9 5 hour study there were approximately 178 electrographic seizures consisting of diffuse paroxysmal fast activity and/or rhythmic bursts of spike wave discharges lasting about 3 to 30 seconds in duration  Longer seizures often had clinical  correlate that variably included, eyelid myoclonia and/or mild posturing of the upper extremities  Samples of seizures reviewed on video did not reveal more prominent tonic-clonic or clonic-tonic seizures that have been observed on past recordings, but  not all seizures were reviewed on video  While similar to past periods of EEG recording, seizure frequency is likely higher on this study than most prior studies  Seizures have been sleep activated in the past and therefore increased seizure burden may  be related to near continuous sleep  Ermelinda Mijares MD   [address] [facility] [place]    Nov 23 2017 12:59PM [facility] Standard Time

## 2018-01-10 NOTE — PROCEDURES
Procedures by Juliocesar Jordan MD at 10/23/2017   8:47 AM      Author:  Juliocesar Jordan MD Service:  Neurology Author Type:  Physician    Filed:  10/24/2017  2:30 PM Date of Service:  10/23/2017  8:47 AM Status:  Addendum    :  Juliocesar Jordan MD (Physician)      Related Notes:  Original Note by Juliocesar Jordan MD (Physician) filed at 10/24/2017  9:36 AM        Procedure Orders:       1  EEG Video Monitoring 24 Hour [id number] ordered by Juliocesar Jordan MD at 10/21/17 0725                  Continuous Video EEG Monitoring       Patient Name:  Cesar Willett  MRN: [id number]   :  1980 File #: [id number]   Age: 40 y o  Encounter #: [id number]   Start Time: 10/22/2017 0800 End Time: 10/23/2017 08:00        Report date: 10/24/2017          Study type: Continuous video EEG    ICD 10 diagnosis: Generalized epilepsy intractable with status G40 311 and Lennox Gastaut syndrome    -------------------------------------------------------------------------------------------------------------------   Patient History: This recording was observed in a 40 y o  female  with Sachin Buttery Syndrome and frequent tonic seizures to evaluate for interval change after in seizure burden       Medications include:     artificial tear 1 application Ophthalmic TID   cloBAZam 10 mg Per G Tube Daily   cloBAZam 20 mg Per NG Tube HS   enoxaparin 40 mg Subcutaneous Daily   lactulose 30 g Per PEG Tube TID   levETIRAcetam 1,000 mg Per G Tube Q12H [address]   levOCARNitine 500 mg Per PEG Tube TID   melatonin 6 mg Oral HS   multivitamin-minerals 1 tablet Per G Tube Daily   ofloxacin 1 drop Both Eyes 4x Daily   Perampanel 8 mg Oral Daily   rufinamide 1,600 mg Per G Tube BID   topiramate 250 mg Per G Tube BID   Valproic Acid 250 mg Oral Q8H [address]       -------------------------------------------------------------------------------------------------------------------   Description of Procedure:  32 channel digital recording with electrodes placed according to the International 10-20 system with additional T1/T2 electrodes,  EOG, EKG, and simultaneous video  A monitoring technologist supervised the continuous recording  The recording was technically satisfactory  -------------------------------------------------------------------------------------------------------------------   Results:   Manual Review:   During wakefulness there were was no definite posteriorly dominant rhythm that attenuated with eye opening  Instead, there were diffuse low amplitude theta activities often near 5 cps, periods of relative suppression  with very low amplitude mixed activities  5 cps activities at times emerged after eye closure  During sleep better regulated 5 cps theta activities attenuated and there were very low to low amplitude poorly regulated mixed frequency theta/alpha activities  with intermittent delta and frequent spike/polyspike wave discharges  There were frequent bursts of arrhythmic generalized spike wave discharges that at times showed shifting lateralization  There were independent left and right temporal spikes  There were bursts of 2-2 5 cps rhythmic generalized discharges that at times  coorrelated with mild eyelid myoclonia  Discharges became infrequent at times during wakefulness, but were nearly continuous during other epochs  The patient was awake during the majority of this study  At times during sleep there were intermittent poorly regulated low to medium amplitude delta activities near 1-2 cps with overriding very low amplitude mixed frequency theta/alpha and beta  During  other epochs there were diffuse low amplitude beta activity with intermittent mild bursts of mixed frequency theta/delta  Most of the sleep during this study occurred between 0200 and 0600  Other findings:  Samples of the single channel ECG demonstrated a regular rhythm  Events/Seizures:    There were frequent bursts of generalized spike/polyspike wave discharges near 1 5-2 5 cps that commonly correlated with eye opening and mild arm movements, especially when longer than 5 seconds  Some events  involvoled head drop  Occasionally there were tonic-clonic seizures or clonic-tonic seizures where a portion of the seizure involved more significant tonic extension of the arms with diffuse fast activity on the EEG   The seizures were relatively accurately  detected by automated seizure detection [employer])  The bursts typically lasted between 3 and 15 seconds, with longer events demonstrating evolution in frequency  Some events lasted up to 45 seconds  The patient was awake during most of the study and  most seizures occurred during wakefulness  Seizure detections are noted below  Detection occurring within one minute of another detection are considered one event  Some more severe tonic-clonic seizures consisting primarily of diffuse fast activity were  not captured by [facility] and were manually added to the table below  Hour Seizures per hour Comment   8 11 1 tonic-clonic seizure   9 14 3 tonic-clonic seizures   10 13    11 13    12 7    13 8    14 15    15 5    16 8    17 8    18 4    19 15    20 28    21 20    22 18 1 tonic-clonic seizure   23 12 4 tonic-clonic seizures   0 13 1 tonic-clonic seizure   1 13    2 15 Frequent runs of paroxysmal fast during sleep, w/o clear clinical correlate   3 15    4 0    5 3    6 3 1 tonic-clonic seizure   7 10 9 tonic-clonic seizures   Grand Total 271             -------------------------------------------------------------------------------------------------------------------   Interpretation: This prolonged, continuous video-EEG recording is abnormal      Background disorganization, theta frequency slowing and frequent generalized as well as independent left and right temporal epileptiform discharges are consistent with the diagnosis of Lennox Gastaut syndrome       During this 24 hour study there were approximately 271 electrographic seizures consisting of rhythmic bursts of spike wave discharges lasting about 4 to 45 seconds in duration  Longer seizures often more prominent clinical correlate that variably included,  eyelid myoclonia, head drop, movements of the upper extremities  20 of the seizures are more prominent tonic-clonic or clonic-tonic events, lasting 15-45 seconds, involving a period of tonic extension of the arms correlating with diffuse paroxysmal fast  activity on EEG  These more clinically prominent seizures increase near the end of recording  Merced Sanchez MD   [address] [facility] Felice DORSEY    Oct 24 2017  2:30PM [facility] Standard Time

## 2018-01-11 NOTE — PROCEDURES
Procedures by Juan Jose Hoffman MD at 10/20/2017   1:58 PM      Author:  Juan Jose Hoffman MD Service:  Neurology Author Type:  Physician    Filed:  10/20/2017  2:33 PM Date of Service:  10/20/2017  1:58 PM Status:  Signed    :  Juan Jose Hoffman MD (Physician)        Procedure Orders:       1  EEG Video Monitoring 24 Hour [id number] ordered by Collin Cooks, MD at 10/13/17 0901                  Continuous Video EEG Monitoring       Patient Name:  Richard Eddy  MRN: [id number]   :  1980 File #: Kala [id number]    Age: 40 y o  Encounter #: [id number]   Start Time: 10/19/2017 0800 End Time: 10/19/2017 2050        Report date: 10/20/2017          Study type: Continuous video EEG    ICD 10 diagnosis: Generalized epilepsy intractable with status G40 311 and Lennox Gastaut syndrome    -------------------------------------------------------------------------------------------------------------------   Patient History: This recording was observed in a 40 y o  female  with Bernadette Purple Syndrome and frequent tonic seizures to evaluate for interval change after in seizure burden  Medications include:     artificial tear 1 application Ophthalmic TID   cloBAZam 10 mg Per NG Tube HS   enoxaparin 40 mg Subcutaneous Daily   lactulose 30 g Per PEG Tube 4x Daily   levETIRAcetam 1,000 mg Per G Tube Q12H [address]   levOCARNitine 500 mg Per PEG Tube TID   melatonin 6 mg Oral HS   multivitamin-minerals 1 tablet Per G Tube Daily   ofloxacin 1 drop Both Eyes 4x Daily   Perampanel 6 mg Oral Daily   rufinamide 1,600 mg Per G Tube BID   topiramate 300 mg Per G Tube BID       -------------------------------------------------------------------------------------------------------------------   Description of Procedure:  32 channel digital recording with electrodes placed according to the International 10-20 system with additional T1/T2 electrodes,  EOG, EKG, and simultaneous video   A monitoring technologist supervised the continuous recording  The recording was technically satisfactory  -------------------------------------------------------------------------------------------------------------------   Results:   Manual Review:   During wakefulness there were was no definite posteriorly dominant rhythm that attenuated with eye opening  Instead, there were diffuse low amplitude theta activities often near 5 cps, periods of relative suppression  with very low amplitude mixed activities  5 cps activities at times emerged after eye closure  During sleep better regulated 5 cps theta activities attenuated and there were very low to low amplitude poorly regulated mixed frequency theta/alpha activities  with intermittent delta and frequent spike/polyspike wave discharges  There were frequent bursts of arrhythmic generalized spike wave discharges that at times showed shifting lateralization  There were independent left and right temporal spikes  There were occasional brief bursts of 2-2 5 cps rhythmic generalized discharges  that at times coorrelated with mild eyelid myoclonia  Discharges became infrequent at times during wakefulness, but were nearly continuous during other epochs  The patient was awake during the majority of this study  Other findings:  Samples of the single channel ECG demonstrated a regular rhythm  Events/Seizures: There were frequent sleep enhanced bursts of generalized spike/polyspike wave discharges near 1 5-2 5 cps that at times correlated with eye opening and mild arm movements on a few occasions  The bursts were  relatively accurately detected by automated seizure detection [employer])  The bursts typically lasted between 3 and 15 seconds, with longer events demonstrating evolution in frequency  None of the stereotyped wallace-clonic seizures (prominent tonic extension  of the arms) that were seen during the prior recording period were seen on this recording       Hour Seizure detections per hour Comments   8 9 9 10    10 37 Sleeping (awake during nearly the entire remainder of recording)   11 8    12 3    13 2    14 9    15 3    16 9    17 9    18 16    19 14    20 8    Grand Total 137           -------------------------------------------------------------------------------------------------------------------   Interpretation: This prolonged, continuous video-EEG recording is abnormal      Background disorganization, theta frequency slowing and frequent generalized as well as independent left and right temporal epileptiform discharges are consistent with the patient's diagnosis of Lennox Gastaut syndrome  During this 9 hours study there were 137 automated seizure detections that correlate very closely with bursts of spikes wave discharges representing atypical absence seizures that often involve eyelid movements and are activated by sleep  None of the  stereotyped tonic-clonic seizures captured during the prior recording period are seen on this recording  The patient began removing the EEG electrodes toward the end of the recording  The study was terminated earlier than initially anticipated to accommodate need for emergent EEG monitoring while our hospital monitoring capabilities were at capacity  Sasha Kat MD   [address] [facility] Violeta Habermann M D    Oct 20 2017  3:24PM [facility] Standard Time

## 2018-01-12 NOTE — PROCEDURES
Procedures by Amrit Lau MD at  10/13/2017  1:40 PM      Author:  Amrit Lau MD Service:  Neurology Author Type:  Physician    Filed:  10/13/2017  2:19 PM Date of Service:  10/13/2017  1:40 PM Status:  Signed    :  Amrit Lau MD (Physician)        Procedure Orders:       1  EEG Video Monitoring 24 Hour [id number] ordered by Rancho Cabezas MD at 10/11/17 2332                  Continuous Video EEG  Long Term Monitoring    Patient Name:  Minoo Mckay  MRN: [id number]   :  1980 File #: Fior Albrecht    Age: 40 y o  Encounter #: [id number]   Date performed: 10/12-10/13/2017 Referring Provider: Duyen Berger MD          Report date: 10/13/2017          Study type: Continuous video EEG, up to 24 hours    ICD 10 diagnosis: Status Epilepticus  G40 911    Start time: 10/12/2017 09:32  End time: 10/13/2017 08:00    Patient History:  Patient is 40 y o  female on continuous video EEG monitoring for the assessment of seizures, characterization  of events, and effect of treatment  Patient has intractable epilepsy, Lennox-Gastaut syndrome, polypharmacy, and admission for healthcare acquired pneumonia  Patient's seizures consist of tonic posturing eyes rolling back and rhythmic jerking of the  upper limbs      Current AEDs:  Medications include:   artificial tear 1 application Ophthalmic TID   cefepime 2,000 mg Intravenous Q12H   cloBAZam 10 mg Per NG Tube BID   enoxaparin 40 mg Subcutaneous Daily   lactulose 20 g Per PEG Tube BID   levETIRAcetam 3,000 mg Intravenous Once   levETIRAcetam 1,000 mg Per G Tube Q12H [facility]   levOCARNitine 100 mg Oral TID   multivitamin-minerals 1 tablet Per G Tube Daily   ofloxacin 1 drop Both Eyes 4x Daily   [START ON 10/14/2017] Perampanel 4 mg Oral Daily   rufinamide 1,600 mg Per G Tube BID   topiramate 250 mg Per G Tube BID   Valproic Acid 250 mg Per G Tube Q8H   vancomycin 15 mg/kg Intravenous Q12H       Description of Procedure:   A 24 hours continuous video EEG was performed with electrodes applied using the International 10-20 System at least 16 channels are reviewed and formatted into longitudinal bipolar, transverse  bipolar, and referential (to common reference or calculated common reference) montages  Additional electrodes used included T1, T2, and extraocular electrodes, and ECG, along with video recording  The EEG was recorded with the patient  awake, drowsy, and asleep state  A monitoring technologist supervised the continuous recording  The recording was technically  satisfactory  Findings:   Background Activity: The background is grossly symmetric with respect to voltages and activities  During wakefulness, the background has excess low voltage beta activity, diffuse low-moderate voltage 5-6 Hz theta activity, and intermittent moderate voltage polymorphic  1-2 Hz delta activity with embedded features of spike-slow waves  During sleep there are diffuse theta activity, sleep spindles, and delta waves  Abnormal findings: There are frequent moderate to high voltage generalized spike and polyspike slow wave complexes, at times, there can be lateralizing fragments of these generalized epileptiform discharges  By 10:10, these  generalized     There are innumerable (nearly continuous) generalized tonic seizures during sleep  Frequently these seizures consist of generalized epileptiform discharge occur as 1 Hz generalized periodic discharges or start as 2-3 Hz high voltage polyspike-slow wave  discharges and transitions to 1 Hz generalized discharges lasting for 30 seconds, sometimes these rhythmical discharges are associated with the patient becoming rigid, tonic posturing with the arms elevated and tense, and subtle jerking of the upper body,  and eyes opening with upward eye deviation  Some of these seizure will start as generalized moderate-high voltage rhythmic alpha-beta discharges before slowing to the spike-slow wave complexes      Most of these generalized tonic seizures occur during the patient's sleep period, especially during slow wave sleep  However, there can be occasional tonic generalized seizures during wakefulness  During periods of wakefulness, these generalized epileptiform discharges are less continuous and intermittent  The patient is mostly awake between 22:00 and 05:00    04:24 - tonic seizure when awake  The EEG background is awake, the patient eyes are closed then she goes into a series of upper arm tonic posturing, head drop and recurrent tonic posturing the arms crossed in front of her  The EEG starts with 2 second electrodecrement followed by rhythmical  fast activity and then sharp delta activity followed by 1 Hz generalized discharges  Other findings: The single lead ECG shows a normal, regular and sinus rhythm  Interpretation: This is an abnormal 22 5 hours continuous video EEG recording  due to nearly continuous generalized discharges, slow background rhythm to the theta range, and extremely frequent generalized tonic seizures, mostly during sleep  These findings can be seen in a severe epileptic encephalopathy such as Lennox Gastaut Syndrome  Most of the seizures and continuous generalized spike-slow waves occur during sleep  These seizures are extremely refractory to medications  It is very unlikely to achieve seizure freedom, but goal would be the lessen the severity of the seizures  James Aiken MD  [facility]  August DORSEY    Oct 13 2017  2:20PM [facility] Standard Time

## 2018-01-12 NOTE — PROGRESS NOTES
Recorded as Task  Date: 02/17/2017 04:45 PM, Created By: Surjit Avilez  Task Name: Stefany Lieberman  Assigned To: Joy London  Regarding Patient: Breanne Vigil, Status: Active  CommentGesugar Lawson - 17 Feb 2017 4:45 PM    Verify task generated in Scan batch moon  Yulisamalissa Surya - 20 Feb 2017 9:43 AM    TASK REASSIGNED: Previously Assigned To Joy London  Ammonia level 86, VPA level was greater than 110  Which is  unusual because prior VPA levels were less than 60 on higher doses of VPA  Please ask when was the level draw relative to the last VPA dose given  If they are rechecking VPA and ammonia levels on 3/3, ask that VPA is drawn before the next  VPA dose  Plus, verify that the ammonia is transported on ICE  Ammonia can be elevated by having the sample sit at room temperature  Lorene Carr - 20 Feb 2017 11:03 AM    TASK REPLIED TO: Previously Assigned To Neurology [facility] labs and spoke with Yu Clements stated the ammonia is transported on ice  I called nursing [place] and spoke with [facility] who stated that the lab usually comes in the morning 5am-10am  The last dose of Depakote the patient had  was 2 pm on 2/16, and the evening dose was held because pt was absent from facility  Pt's morning dose of Depakote was held as well, she could not give me the reason why  Pt then had labs drawn the morning of 2/17  She was given the instructions  below, and instructed not to hold doses of her seizures medications  Joy London - 20 Feb 2017 1:43 PM    TASK REPLIED TO: Previously Assigned To Joy London  that makes no sense as to her current level  To miss 2 out of 3 doses and still  have excessive VPA is concerning  I want a VPA level Wednesday morning  that would be 6 days of consistent VPA dosing  please call the pharmacy to verify that she is being given 250mg q8hrs  I'll place a VPA level order    Larry Diego  - 21 Feb 2017 10:23 AM    TASK EDITED  I spoke with pharmacy re: VPA dosing  They were able to confirm that pt is receiving 250mg q 8hrs  I also spoke with Jessica Escamilla at pt's facility and they will be obtaining a vpa level 2/22/17 as requested  Electronically signed Audi DORSEY    Feb 21 2017  3:47PM EST Author

## 2018-01-13 VITALS
SYSTOLIC BLOOD PRESSURE: 68 MMHG | HEART RATE: 92 BPM | HEIGHT: 63 IN | DIASTOLIC BLOOD PRESSURE: 64 MMHG | RESPIRATION RATE: 14 BRPM

## 2018-01-13 VITALS — HEART RATE: 85 BPM | HEIGHT: 63 IN | DIASTOLIC BLOOD PRESSURE: 58 MMHG | SYSTOLIC BLOOD PRESSURE: 94 MMHG

## 2018-01-13 NOTE — PROCEDURES
Procedures by Yevgeniy Warner MD at 2017  12:25 PM      Author:  Yevgeniy Warner MD Service:  Neurology Author Type:  Physician    Filed:  2017  1:12 PM Date of Service:  2017 12:25 PM Status:  Signed    :  Yevgeniy Warner MD (Physician)        Procedure Orders:       1  EEG Video Monitoring 24 Hour [id number] ordered by Yevgeniy Warner MD at 17 0828                  Continuous Video EEG Monitoring       Patient Name:  Curt Henry  MRN: [id number]   :  1980 File #: [id number]   Age: 40 y o  Encounter #: [id number]   Start Time: 2017 0800 End Time: 2017 08:00        Report date: 2017          Study type: Continuous video EEG    ICD 10 diagnosis: Generalized epilepsy intractable with status G40 311 Lennox Gastaut syndrome      -------------------------------------------------------------------------------------------------------------------   Patient History: This recording was observed in a 40 y  o parfemale with Wing Nicholas Syndrome and frequent tonic seizures to evaluate for interval change in seizure burden that may contribute to excessive  sedation       Medications include:     artificial tear 1 application Ophthalmic TID   cloBAZam 20 mg Per NG Tube HS   enoxaparin 40 mg Subcutaneous Q24H   lactulose 30 g Oral TID   levETIRAcetam 1,000 mg Per G Tube Q12H [address]   levOCARNitine 500 mg Per G Tube TID   melatonin 6 mg Oral HS   Perampanel 8 mg Oral Daily   potassium chloride 40 mEq Per PEG Tube Daily   rufinamide 1,600 mg Per G Tube BID   senna-docusate sodium 1 tablet Per G Tube Daily   topiramate 250 mg Per G Tube BID   Valproic Acid 250 mg Per G Tube Q8H       -------------------------------------------------------------------------------------------------------------------   Description of Procedure:  32 channel digital recording with electrodes placed according to the International 10-20 system with additional T1/T2 electrodes,  EOG, EKG, and simultaneous video  A monitoring technologist supervised the continuous recording  The recording was technically satisfactory  -------------------------------------------------------------------------------------------------------------------   Results:   Manual Review:   During wakefulness there were was no definite posteriorly dominant rhythm that attenuated with eye opening  Instead, there were diffuse low amplitude theta activities often near 5  cps, periods of relative suppression with very low amplitude mixed activities  During sleep better regulated 5 cps theta activities attenuated and there were very low to low amplitude poorly regulated mixed frequency theta/alpha activities with intermittent  delta and frequent spike/polyspike wave discharges       There were frequent bursts of arrhythmic generalized spike wave discharges that at times showed shifting lateralization  There were independent left and right temporal spikes  There were bursts of 2-2 5 cps rhythmic generalized discharges that at times  correlated with mild eyelid myoclonia       The patient was asleep during the majority of this study  At times during sleep there were intermittent poorly regulated low to medium amplitude delta activities near 1-2 cps with overriding very low amplitude mixed frequency theta/alpha and beta  During  other epochs there were diffuse low amplitude beta activity with intermittent mild bursts of mixed frequency theta/delta  Other findings:  Samples of the single channel ECG demonstrated a regular rhythm  Events:   Seizures involved diffuse medium to high amplitude paroxysmal fast activity and/or rhythmic diffuse spike wave discharges typically lasted between 3 and 15 seconds, with longer events  demonstrating evolution in frequency  Events lasting longer than 5 seconds or more often had often had mild clinical correlate of eye opening and mild posturing of the R more than L UE   The seizures were relatively accurately detected by automated seizure  detection Jesus Smith  parSome events lasted up to 30 seconds  Most seizures occurred during sleep  Hours with fewer seizure detections (11:00-12:00, 15:00-22:00) correspond with epochs that primarily contain wakefulness  Seizure detections are noted below  Detection occurring within one minute of another detection are considered one event  Hour Seizure Count   8 6   9 16   10 11   11 6   12 9   13 11   14 20   15 5   16 4   17 8   18 6   19 1   20 3   22 6   23 22   0 18   1 13   2 15   3 13   4 11   5 11   6 11   7 11   Grand Total 237         -------------------------------------------------------------------------------------------------------------------   Interpretation: This prolonged, continuous video-EEG recording is abnormal       Background disorganization, theta frequency slowing and frequent generalized as well as independent left and right temporal epileptiform discharges are consistent with the diagnosis of Lennox Gastaut syndrome       During this 24 hour study there were approximately 237 electrographic seizures consisting of diffuse paroxysmal fast activity and/or rhythmic bursts of spike wave discharges lasting about 3 to 30 seconds in duration  Longer seizures often had clinical  correlate that variably included, eyelid myoclonia and/or mild posturing of the upper extremities  Samples of seizures reviewed on video did not reveal more prominent tonic-clonic or clonic-tonic seizures that have been observed on past recordings, but  not all seizures were reviewed on video  Hourly seizure burden is improved on this study compared to the prior recording period and this may in part be explained by increased wakefulness (her seizures increase in sleep)  Joana Mascorro MD   [address] [facility] [place]    Nov 24 2017  1:13PM [place] Standard Time

## 2018-01-13 NOTE — PROCEDURES
Procedures by Yina Gomez MD at 10/19/2017   3:40 PM      Author:  Yina Gomez MD Service:  Neurology Author Type:  Physician    Filed:  10/19/2017  4:22 PM Date of Service:  10/19/2017  3:40 PM Status:  Signed    :  Yina Gomez MD (Physician)        Procedure Orders:       1  EEG Video Monitoring 24 Hour [id number] ordered by Yina Gomez MD at 10/18/17 1348                  Continuous Video EEG Monitoring       Patient Name:  Harish Suarez  MRN: [id number]   :  1980 File #: Suzan Casas    Age: 40 y o  Encounter #: [id number]   Start Time: 10/18/2017 1741 End Time: 10/19/2017 08:00        Report date: 10/19/2017          Study type: Continuous video EEG    ICD 10 diagnosis: Generalized epilepsy intractable with status G40 311 and Lennox Gastaut syndrome    -------------------------------------------------------------------------------------------------------------------   Patient History: This recording was observed in a 40 y o  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  and frequent tonic seizures to evaluate for interval change after in seizure burden  Medications include:   artificial tear 1 application Ophthalmic TID   enoxaparin 40 mg Subcutaneous Daily   lactulose 30 g Per PEG Tube 4x Daily   levETIRAcetam 1,000 mg Per G Tube Q12H [address]   levOCARNitine 500 mg Per PEG Tube TID   melatonin 6 mg Oral HS   multivitamin-minerals 1 tablet Per G Tube Daily   ofloxacin 1 drop Both Eyes 4x Daily   [START ON 10/20/2017] Perampanel 6 mg Oral Daily   rufinamide 1,600 mg Per G Tube BID   topiramate 300 mg Per G Tube BID   Valproic Acid 125 mg Per G Tube Q8H       -------------------------------------------------------------------------------------------------------------------   Description of Procedure:  32 channel digital recording with electrodes placed according to the International 10-20 system with additional T1/T2 electrodes,  EOG, EKG, and simultaneous video   A monitoring technologist supervised the continuous recording  The recording was technically satisfactory  -------------------------------------------------------------------------------------------------------------------   Results:   Manual Review:   During wakefulness there were was no definite posteriorly dominant rhythm that attenuated with eye opening  Instead, there were diffuse low amplitude theta activities often near 5 cps, periods of relative suppression  with very low amplitude mixed activities  5 cps activities at times emerged after eye closure  There were frequent bursts of arrhythmic generalized spike wave discharges that at times showed shifting lateralization  There were independent left and right temporal spikes  There were occasional brief bursts of 2-2 5 cps rhythmic generalized discharges  that at times coorrelated with mild eyelid myoclonia  Discharges became infrequent at times during wakefulness, but were nearly continuous during other epochs  The patient was awake during the majority of this study  Other findings:  Samples of the single channel ECG demonstrated a regular rhythm  Events/Seizures: There were 4 relatively stereotyped tonic seizures involving bilateral tonic extension of the arms lasting for about 2-4 seconds that correlated with diffuse attenuation and low amplitude fast activity  This  was followed diffuse 2-2 5 cps high amplitude rhythmic spike wave discharges for 30 sec to 3 minutes  These rhythmic discharges correlated with a clonic phase that was initially diffuse and then became more subtle and isolated to eyelid myoclonia  Some  seizures began with a few left temporal discharges  There were absences seizures with eyelid myoclonia that correlated with generalized 2-2 5 cps spike wave discharges typically lasting about 5-8 seconds  At times these occurred multiple times per hour, but there were also hours without seizures  -------------------------------------------------------------------------------------------------------------------   Interpretation: This prolonged, continuous video-EEG recording is abnormal      Background disorganization, theta frequency slowing and frequent generalized as well as independent left and right temporal epileptiform discharges are consistent with the patient's diagnosis of Lennox Gastaut syndrome  During this 13 hours study there were 4 stereotyped tonic-clonic seizures and numerous absence seizures with eyelid myoclonia  Many of the seizures are preceded by a brief burst of left temporal spikes  Juliocesar Jordan MD   [address] [facility] [place]    Oct 19 2017  4:22PM [facility] Standard Time

## 2018-01-13 NOTE — PROCEDURES
Procedures by Lizeth Jones MD  at 10/16/2017 10:03 AM      Author:  Lizeth Jones MD Service:  Neurology Author Type:  Physician    Filed:  10/16/2017 11:04 AM Date of Service:  10/16/2017 10:03 AM Status:  Signed    :  Lizeth Jones MD (Physician)        Procedure Orders:       1  EEG Video Monitoring 24 Hour [[id number]] ordered by Dee Alaniz MD at 10/13/17 1940                  Continuous Video EEG Monitoring       Patient Name:  Richie Tabor  MRN: [id number]   :  1980 File #: Geovanna Jean-Baptiste 12-0   Age: 40 y o  Encounter #: [id number]   Date Performed: 10/15/2017  Date EEG Reviewed: 10/15/2017    Report date: 10/16/2017          Study type: Continuous video EEG    ICD 10 diagnosis: Generalized epilepsy intractable without status G40 319    Start time: 0800 on 10/15 End time: 2105 on 10/15     -------------------------------------------------------------------------------------------------------------------   Patient History:  40year old female with  Renee Amidon presents from nursing home with sepsis secondary to possible pneumonia  Patient reportedly has frequent daily tonic seizures as her baseline despite  being on five anti-epilepsy drugs and having a vagus nerve stimulator  Caregivers state that seizures may have increased when the patient developed pneumonia      Medications include:     artificial tear 1 application Ophthalmic TID   cefepime 2,000 mg Intravenous Q12H   cloBAZam 10 mg Per NG Tube BID   enoxaparin 40 mg Subcutaneous Daily   lactulose 20 g Per PEG Tube BID   levETIRAcetam 1,000 mg Per G Tube Q12H [address]   levOCARNitine 500 mg Oral TID   multivitamin-minerals 1 tablet Per G Tube Daily   ofloxacin 1 drop Both Eyes 4x Daily   Perampanel 4 mg Oral Daily   rufinamide 1,600 mg Per G Tube BID   topiramate 250 mg Per G Tube BID   Valproic Acid 250 mg Per G Tube Q8H   vancomycin 15 mg/kg Intravenous Q12H -------------------------------------------------------------------------------------------------------------------   Description of Procedure:  ·  32 channel digital recording with electrodes placed according to the International 10-20 system with additional  T1/T2 electrodes, EOG, EKG, and simultaneous  video  A monitoring technologist supervised the continuous recording  The recording was technically satisfactory  -------------------------------------------------------------------------------------------------------------------   Results:   Background Activity:   The patient was asleep for almost the entire recording, achieving wakefulness only from 1346 through 1401  During wakefulness, background activity consists of continuous irregular, low  voltage, posteriorly dominant, symmetric, reactive  theta activity with AP gradient  absent       During periods of  light sleep,diffusely distributed rhythmic theta activity  During  Stage 2 sleep, symmetric vertex sharp waves, K complexes and sleep spindles were present   Activation Procedures:   Neither hyperventilation nor photic stimulation was performed  Abnormal Findings:  See Background Activity  During sleep, frequent independent T3>T4 spikes were noted  Occasionally the spikes occur simultaneously at T3 and T4  Unlike the previous days' recording, during this recording, both T3 and T4 spikes occurred more frequently, and more often occurred in brief bursts of periodic spike activity  Also, whereas in previous recordings, T4 spikes occurred more frequently then  T3 spikes, in this recording T3 spikes occurred significantly more frequently then those in T4  Other findings: The single lead EKG demonstrated a regular  rhythm  Events:   During sleep, the patient has  frequent electrographic seizures, with these four ictal EEG patterns:  1   An initial  1 second period of diffuse electrodecrement, followed by a 2-3 second period of diffuse low amplitude fast activity, followed by generalized 2 Hz spike wave discharge  The spike wave discharges last 3-8 seconds  This pattern  is generally not not associated with any clinical manifestations other than, sometimes, rhythmic blinking in time with the spike wave discharges  2  The same as #1, except that the diffuse low amplitude fast activity lasts 2-4 seconds, accompanied by tonic posturing of the upper extremities, then evolve to 2-3 seconds of periodic bursts of low amplitude fast activity accompanied by clonic jerking  of the upper extremities  These seizures occurred only 6-7 times during the 9 hour recording  3  Periods of diffuse low amplitude fast activity lasting 2-5 seconds that do not progress to spike- wave discharges  The diffuse low amplitude fast activity was often preceded by a left temporal lead-in lasting less than one second, consisting of 1- 5  T3 spikes  These seizures were not accompanied by clinical manifestations  4  Periods of 2 Hz generalized spike wave activity lasting up to 2 minutes which are not preceded by electrodecrement nor diffuse fast activity  The generally spike wave discharges were often preceded by a left temporal lead-in lasting less than one second,  consisting of 1- 5 T3 spikes  NO clinical manifestations are noted with this ictal pattern, though responsiveness was not tested during them  In this recording, this was the most frequent seizure type  The patient did not have any seizures during the 15 minutes that she was awake   -------------------------------------------------------------------------------------------------------------------   Interpretation:   Markedly abnormal 9  hour continuous video-EEG recording, due to continuous background irregular theta slowing  and frequent independent left greater than right midtemporal spikes and occasional simultaneous bitemporal spikes   Frequent tonic seizures, atypical absence seizures and brief tonic clonic seizures continued to occur during sleep, and the patient was  asleep throughout the recording, achieving wakefulness for only about 15 minutes  The following notable changes in ictal and interictal patterns were noted when compared to the videoEEG monitoring performed over the previous 3 days:  1  Independent left and right midtemporal spikes have increased in frequency, and whereas right temporal spikes had been more frequent, in this recording, left temporal spikes are more frequent  2  Whereas brief tonic clonic seizures had been occurring at least every 2 minutes during sleep when recording first started on 10/12, they only occurred about 6 times in 9 hours during this study  3  With the decrease in frequency of the tonic clonic seizures and the tonic seizures, the most frequent seizure type during this recording is the atypical absence seizure (see #4 below), though even those are shorter than they had been  4  Nearly all of the seizures that consist of periods of 2 Hz generalized spike wave, as well as the seizures that consist of diffuse low amplitude fast activity are now preceded by a lead-in of a split second burst of left midtemporal spikes, raising  the question of whether these generalized seizures in fact originate or are at least triggered by epileptic discharges in the left temporal lobe  Tali Anderson MD   Medical Director  [address] [facility]  Pager # Chuckie Blizzard M D    Oct 16 2017 11:04AM [facility] Standard Time

## 2018-01-13 NOTE — PROCEDURES
Procedures by Ke Fox MD  at 10/14/2017  5:31 PM      Author:  Ke Fox MD Service:  Neurology Author Type:  Physician    Filed:  10/16/2017 10:00 AM Date of Service:  10/14/2017  5:31 PM Status:  Addendum    :  Ke Fox MD (Physician)      Related Notes:  Original Note by Ke Fox MD (Physician) filed at 10/16/2017  9:14 AM        Procedure Orders:       1  EEG Video Monitoring 24 Hour [id number] ordered by Ke Fox MD at 10/12/17 1458                  Continuous Video EEG Monitoring       Patient Name:  Amando Hannah  MRN: [id number]   :  1980 File #: Barb Handler    Age: 40 y o  Encounter #: [id number]   Date Performed: 10/13/2017 to 10/14/2017  Date EEG Reviewed: 10/14/2017    Report date: 10/15/2017          Study type: Continuous video EEG    ICD 10 diagnosis: Generalized epilepsy intractable without status G40 319    Start time: 0800 on 10/13 End time: 0800 on 10/14     -------------------------------------------------------------------------------------------------------------------   Patient History:  40year old female with  Nancy Rouse presents from nursing home with sepsis secondary to possible pneumonia  Patient reportedly has frequent daily tonic seizures as her baseline despite  being on five anti-epilepsy drugs and having a vagus nerve stimulator  Caregivers state that seizures may have increased when the patient developed pneumonia      Medications include:   artificial tear 1 application Ophthalmic TID   cefepime 2,000 mg Intravenous Q12H   cloBAZam 10 mg Per NG Tube BID   enoxaparin 40 mg Subcutaneous Daily   lactulose 20 g Per PEG Tube BID   levETIRAcetam 1,000 mg Per G Tube Q12H [address]   levOCARNitine 500 mg Oral TID   multivitamin-minerals 1 tablet Per G Tube Daily   ofloxacin 1 drop Both Eyes 4x Daily   Perampanel 4 mg Oral Daily   rufinamide 1,600 mg Per G Tube BID   topiramate 250 mg Per G Tube BID   Valproic Acid 250 mg Per G Tube Q8H   vancomycin 15 mg/kg Intravenous Q12H       -------------------------------------------------------------------------------------------------------------------   Description of Procedure:  ·  32 channel digital recording with electrodes placed according to the International 10-20 system with additional  T1/T2 electrodes, EOG, EKG, and simultaneous  video  A monitoring technologist supervised the continuous recording  The recording was technically satisfactory  -------------------------------------------------------------------------------------------------------------------   Results:   Background Activity:   The patient was asleep for nearly the entire recording  During periods when the patient appeared awake ( may have been drowsy), background activity consists of continuous  irregular, low voltage, posteriorly dominant, symmetric,  reactive  theta activity with AP gradient absent       During periods of  light sleep,diffusely distributed rhythmic theta activity  During Stage  2 sleep, symmetric vertex sharp waves, K complexes and sleep spindles were present   Activation Procedures:   Neither hyperventilation nor photic stimulation was performed  Abnormal Findings:  See Background Activity  During sleep, frequent bursts of 2 Hz generalized spike wave activity, intermixed with abnormalities more consistent with frequent electrographic seizures (see below)  Seizures occur so frequently that it is  difficult to distinguish between brief bursts of generalized spike wave that are still part of an ictal discharge vs true interictal abnormalities  At 06:53:39, the patient starts having frequent independent T4>T3 spikes  Occasionally the spikes occur simultaneously at T3 and T4  Other findings: The single lead EKG demonstrated a regular  rhythm      Events:   During sleep, the patient has very frequent nearly continuous electrographic seizures, some of which are accompanied by tonic posturing of the proximal upper extremities bilaterally  Occasionally, the tonic  posturing is followed by a few clonic jerks of the upper extremities  There are four ictal EEG patterns:  1  The most frequent EEG pattern consists of an initial  1-3 second period of diffuse electrodecrement, followed by a 2-7 second period of diffuse low amplitude fast activity, followed by generalized 2 Hz spike wave discharge  The spike wave discharges  last 3-5 minutes  This pattern is generally not not associated with any clinical manifestations other than, sometimes, rhythmic blinking in time with the spike wave discharges  2  The same as #1, except that the diffuse low amplitude fast activity can last up to 15 seconds, accompanied by tonic posturing of the upper extremities, then evolve to 2-4 seconds of periodic bursts of low amplitude fast activity accompanied by clonic  jerking of the upper extremities  3  Periods of diffuse low amplitude fast activity lasting 2-5 seconds that do not progress to spike- wave discharges  4  Periods of 2 Hz generalized spike wave activity lastind up to 2-3 minutes which are not preceded by electrodecrement nor diffuse fast activity  NO clinical manifestations are noted with this ictal pattern, though responsiveness was not tested during  them  Types 1 and 2 are noted occasionally during wakefulness  -------------------------------------------------------------------------------------------------------------------   Interpretation:   Markedly abnormal 22  hour continuous video-EEG recording, due to continuous  background irregular theta slowing, interictal abnormalities consisting of 2 Hz generalized spike wave, and after 0653, frequent independent right greater than left midtemporal spikes and occasional simultaneous bitemporal spikes  Frequent,  nearly continuous tonic seziures, atypical absence seizures and brief tonic clonic seizures are noted during sleep   Occasionally, the tonic seizures and less frequently, the tonic clonic seizures occur during wakefulness  Prudence Jenkins MD   Medical Director  [address] [facility]  Pager # [phone] [place]    Oct 16 2017 10:01AM [facility] Standard Time

## 2018-01-14 VITALS
WEIGHT: 83 LBS | RESPIRATION RATE: 14 BRPM | DIASTOLIC BLOOD PRESSURE: 57 MMHG | SYSTOLIC BLOOD PRESSURE: 90 MMHG | HEART RATE: 75 BPM | BODY MASS INDEX: 14.71 KG/M2 | HEIGHT: 63 IN

## 2018-01-14 NOTE — PROCEDURES
Procedures by Albaro Dorsey MD at 10/26/2017  12:13 PM      Author:  Albaro Dorsey MD Service:  Neurology Author Type:  Physician    Filed:  10/27/2017  3:12 PM Date of Service:  10/26/2017 12:13 PM Status:  Signed    :  Albaro Dorsey MD (Physician)        Procedure Orders:       1  EEG Video Monitoring 24 Hour [id number] ordered by Albaro Dorsey MD at 10/24/17 1156                  Continuous Video EEG Monitoring       Patient Name:  Valentin Lopez  MRN: [id number]   :  1980 File #: [id number]   Age: 40 y o  Encounter #: [id number]   Start Time: 10/24/2017 0800 End Time: 10/25/2017 08:00        Report date: 10/27/2017          Study type: Continuous video EEG    ICD 10 diagnosis: Generalized epilepsy intractable with status G40 311 and Lennox Gastaut syndrome    -------------------------------------------------------------------------------------------------------------------   Patient History: This recording was observed in a 40 y o  female with Andi John Syndrome  and frequent tonic seizures to evaluate for interval change after in seizure burden       Medications include:   artificial tear 1 application Ophthalmic TID   cloBAZam 10 mg Per G Tube Daily   cloBAZam 20 mg Per NG Tube HS   enoxaparin 40 mg Subcutaneous Daily   lactulose 30 g Per PEG Tube TID   levETIRAcetam 1,000 mg Per G Tube Q12H [address]   levOCARNitine 500 mg Per PEG Tube TID   melatonin 6 mg Oral HS   multivitamin-minerals 1 tablet Per G Tube Daily   ofloxacin 1 drop Both Eyes 4x Daily   Perampanel 8 mg Oral Daily   rufinamide 1,600 mg Per G Tube BID   topiramate 250 mg Per G Tube BID   Valproic Acid 250 mg Oral Q8H [address]         -------------------------------------------------------------------------------------------------------------------   Description of Procedure:  32 channel digital recording with electrodes placed according to the International 10-20 system with additional T1/T2 electrodes,  EOG, EKG, and simultaneous video  A monitoring technologist supervised the continuous recording  The recording was technically satisfactory  -------------------------------------------------------------------------------------------------------------------   Results:   Manual Review:   Manual review of the tracing was essentially unchanged from the prior day's recording  During wakefulness there were was no definite posteriorly dominant rhythm  Instead, there were diffuse low amplitude theta activities often near 5 cps,  periods of relative suppression with very low amplitude mixed, predominantly anteriorly dominant beta activities  There were frequent independent right  and left temporal spike wave discharges as well as generalized spike wave discharges  These generalized spike wave discharges often occurred in rhythmic bursts of 2-2 5 cps rhythmic generalized discharges that at times  coorrelated with mild eyelid myoclonia  During periods of sleep, theta activities attenuated and there were diffuse, low amplitude beta activities  There continued to be frequent epileptiform discharges during sleep, as described above  Other findings:  Samples of the single channel ECG demonstrated a regular rhythm  Events/Seizures:   Although no push buttons were activated, there again were numerous seizures seen throughout the study  Tonic seizures manifested as bilateral arm stiffening  Tonic-clonic seizures would begin with a tonic seizure,  but would last longer and have clonic activity of both arms before abruptly stopping  There also were innumerable more subtle seizures that either had no clinical change or only subtle arm movements   All of these seizures were elctrographically similar,  starting with a burst of generalized slow spike wave discharges at 2-2 5 cps that was followed by generalized attenuation of activity and low amplitude rhythmic, generalized beta activity that gradually increased in frequency and amplitude to be rhythmic  alpha activity before abruptly attenuating  Additionally, there were very frequent prolonged bursts of generalized slow spike wave discharges without any clear clinical change that would last about 6-12 seconds  During samples of the tracin:00-09:00:  2 tonic and tonic-clonic seizures, 1 seizure with subtle clinical changes, and 4 bursts of slow spike wave    10:00-11:00: 4 tonic seizures, no subtle seizures or bursts of slow spike wave  12:00-13:00  No seizures, one burst of slow spike wave    23:30 - 00:30  1 seizures with only subtle clinical changes, and 9 bursts of slow spike wave    07:00 - 08:00  1 bursts of slow spike wave and two seizures with only subtle clinical changes    -------------------------------------------------------------------------------------------------------------------   Interpretation: This prolonged, continuous video-EEG recording is abnormal  Numerous clinical and electrographic seizures (mostly tonic, tonic-clonic, or subtle tonic seizures) were  captured, overall decreased in frequency compared to the prior days recording with samples listed above  Background disorganization, theta frequency slowing and frequent generalized as well as independent left and right  temporal epileptiform discharges are consistent with the diagnosis of Lennox Gastaut syndrome  MD [facility]               Received for:Rory DORSEY    Oct 27 2017  3:18PM [facility] Standard Time

## 2018-01-15 NOTE — PROCEDURES
Procedures by Kenneth Gonsales MD at 2017   7:49 AM      Author:  Kenneth Gonsales MD Service:  Neurology Author Type:  Physician    Filed:  2017  7:53 AM Date of Service:  2017  7:49 AM Status:  Signed    :  Kenneth Gonsales MD (Physician)        Procedure Orders:       1  EEG Video Monitoring 24 Hour [id number] ordered by Kenneth Gonsales MD at 17 1012                  Continuous Video EEG Monitoring       Patient Name:  Jakub Arias  MRN: [id number]   :  1980 File #: [id number]   Age: 40 y o  Encounter #: [id number]   Start Time: 17 0800 End Time: 177        Report date: 2017          Study type: Continuous video EEG    ICD 10 diagnosis: Generalized epilepsy intractable with status G40 311 Lennox Gastaut syndrome      -------------------------------------------------------------------------------------------------------------------   Patient History: This recording was observed in a 40 y  o parfemale with Adline Mola Syndrome and frequent tonic seizures to evaluate for interval change in seizure burden that may contribute to excessive  sedation       Medications include:     artificial tear 1 application Ophthalmic TID   cloBAZam 20 mg Per NG Tube HS   enoxaparin 40 mg Subcutaneous Q24H   lactulose 30 g Oral TID   levETIRAcetam 1,000 mg Per G Tube Q12H [facility]   levOCARNitine 500 mg Per G Tube TID   melatonin 6 mg Oral HS   Perampanel 8 mg Oral Daily   potassium chloride 40 mEq Per PEG Tube Daily   rufinamide 1,600 mg Per G Tube BID   senna-docusate sodium 1 tablet Per G Tube Daily   topiramate 250 mg Per G Tube BID   Valproic Acid 250 mg Per G Tube Q8H       -------------------------------------------------------------------------------------------------------------------   Description of Procedure:  32 channel digital recording with electrodes placed according to the International 10-20 system with additional T1/T2 electrodes,  EOG, EKG, and simultaneous video  A monitoring technologist supervised the continuous recording  The recording was technically satisfactory  -------------------------------------------------------------------------------------------------------------------   Results:   Manual Review:   During wakefulness there were was no definite posteriorly dominant rhythm that attenuated with eye opening  Instead, there were diffuse low amplitude theta activities often near 5  cps, periods of relative suppression with very low amplitude mixed activities  During sleep better regulated 5 cps theta activities attenuated and there were very low to low amplitude poorly regulated mixed frequency theta/alpha activities with intermittent  delta and frequent spike/polyspike wave discharges       There were frequent bursts of arrhythmic generalized spike wave discharges that at times showed shifting lateralization  There were independent left and right temporal spikes  There were bursts of 2-2 5 cps rhythmic generalized discharges that at times  correlated with mild eyelid myoclonia       The patient was asleep during the majority of this study  At times during sleep there were intermittent poorly regulated low to medium amplitude delta activities near 1-2 cps with overriding very low amplitude mixed frequency theta/alpha and beta  During  other epochs there were diffuse low amplitude beta activity with intermittent mild bursts of mixed frequency theta/delta  Other findings:  Samples of the single channel ECG demonstrated a regular rhythm  Events:   Seizures involved diffuse medium to high amplitude paroxysmal fast activity and/or rhythmic diffuse spike wave discharges typically lasted between 3 and 15 seconds, with longer events  demonstrating evolution in frequency  Events lasting longer than 5 seconds or more often had often had mild clinical correlate of eye opening and mild posturing of the R more than L UE   The seizures were relatively accurately detected by automated seizure  detection Nabil Matias  parSome events lasted up to 30 seconds  Most seizures occurred during sleep  Detection occurring within one minute of another detection are considered one event  Hour Seizure Count   8 15   9 12   10 11   11 12   12 12   Grand Total 62         -------------------------------------------------------------------------------------------------------------------   Interpretation: This prolonged, continuous video-EEG recording is abnormal       Background disorganization, theta frequency slowing and frequent generalized as well as independent left and right temporal epileptiform discharges are consistent with the diagnosis of Lennox Gastaut syndrome       During this 5parhour study there were approximately 62 electrographic seizures consisting of diffuse paroxysmal fast activity and/or rhythmic bursts of spike wave discharges lasting about 3 to 30 seconds in duration  Kenneth Gonsales MD   [address] [facility] Luis DORSEY    Nov 26 2017  7:54AM [facility] Standard Time

## 2018-01-15 NOTE — PROCEDURES
Procedures by Padma Rodriguez MD  at 10/15/2017  9:23 AM      Author:  Padma Rodriguez MD Service:  Neurology Author Type:  Physician    Filed:  10/16/2017  9:58 AM Date of Service:  10/15/2017  9:23 AM Status:  Signed    :  Padma Rodriguez MD (Physician)        Procedure Orders:       1  EEG Video Monitoring 24 Hour [id number] ordered by Padma Rodriguez MD at 10/13/17 2046                  Continuous Video EEG Monitoring       Patient Name:  Angeline Peres  MRN: [id number]   :  1980 File #: Fredderick Magdaleno    Age: 40 y o  Encounter #: [id number]   Date Performed: 10/14/2017 to 10/15/2017  Date EEG Reviewed: 10/15/2017    Report date: 10/16/2017          Study type: Continuous video EEG    ICD 10 diagnosis: Generalized epilepsy intractable without status G40 319    Start time: 0800 on 10/14 End time: 0800 on 10/15     -------------------------------------------------------------------------------------------------------------------   Patient History:  40year old female with  Kavita Silver presents from nursing home with sepsis secondary to possible pneumonia  Patient reportedly has frequent daily tonic seizures as her baseline despite  being on five anti-epilepsy drugs and having a vagus nerve stimulator  Caregivers state that seizures may have increased when the patient developed pneumonia      Medications include:     artificial tear 1 application Ophthalmic TID   cefepime 2,000 mg Intravenous Q12H   cloBAZam 10 mg Per NG Tube BID   enoxaparin 40 mg Subcutaneous Daily   lactulose 20 g Per PEG Tube BID   levETIRAcetam 1,000 mg Per G Tube Q12H [facility]   levOCARNitine 500 mg Oral TID   multivitamin-minerals 1 tablet Per G Tube Daily   ofloxacin 1 drop Both Eyes 4x Daily   Perampanel 4 mg Oral Daily   rufinamide 1,600 mg Per G Tube BID   topiramate 250 mg Per G Tube BID   Valproic Acid 250 mg Per G Tube Q8H   vancomycin 15 mg/kg Intravenous Q12H -------------------------------------------------------------------------------------------------------------------   Description of Procedure:  ·  32 channel digital recording with electrodes placed according to the International 10-20 system with additional  T1/T2 electrodes, EOG, EKG, and simultaneous  video  A monitoring technologist supervised the continuous recording  The recording was technically satisfactory  -------------------------------------------------------------------------------------------------------------------   Results:   Background Activity:   The patient was asleep for almost the entire recording, achieving wakefulness only from 1558 through 1711, ie just a little over an hour  During wakefulness, background activity consists of continuous irregular, low  voltage, posteriorly dominant, symmetric, reactive  theta activity with AP gradient  absent       During periods of  light sleep,diffusely distributed rhythmic theta activity  During  Stage 2 sleep, symmetric vertex sharp waves, K complexes and sleep spindles were present   Activation Procedures:   Neither hyperventilation nor photic stimulation was performed  Abnormal Findings:  See Background Activity  During sleep, frequent bursts of 2 Hz generalized spike wave activity, intermixed with abnormalities more consistent with frequent electrographic seizures (see below)  Seizures occur so frequently that it is  difficult to distinguish between brief bursts of generalized spike wave that are still part of an ictal discharge vs true interictal abnormalities  Frequent independent T4>T3 spikes were noted, sometimes occurring in brief periodic runs  Occasionally the spikes occur simultaneously at T3 and T4  Other findings: The single lead EKG demonstrated a regular  rhythm      Events:   During sleep, the patient has  frequent electrographic seizures, some of which are accompanied by tonic posturing of the proximal upper extremities bilaterally  Occasionally, the tonic posturing is followed  by a few clonic jerks of the upper extremities  There are four ictal EEG patterns:  1  The most frequent EEG pattern consists of an initial  1 second period of diffuse electrodecrement, followed by a 2-3 second period of diffuse low amplitude fast activity, followed by generalized 2 Hz spike wave discharge  The spike wave discharges  last 3-8 seconds  This pattern is generally not not associated with any clinical manifestations other than, sometimes, rhythmic blinking in time with the spike wave discharges  2  The same as #1, except that the diffuse low amplitude fast activity lasts 2-4 seconds, accompanied by tonic posturing of the upper extremities, then evolve to 2-3 seconds of periodic bursts of low amplitude fast activity accompanied by clonic jerking  of the upper extremities  These brief tonic clonic seizures occur every  seconds  3  Periods of diffuse low amplitude fast activity lasting 2-5 seconds that do not progress to spike- wave discharges  4  Periods of 2 Hz generalized spike wave activity lasting up to 2 minutes which are not preceded by electrodecrement nor diffuse fast activity  NO clinical manifestations are noted with this ictal pattern, though responsiveness was not tested during  them  The patient did not have any seizures during the 73 minutes that she was awake   -------------------------------------------------------------------------------------------------------------------   Interpretation:   Markedly abnormal 24  hour continuous video-EEG recording, due to continuous background irregular theta slowing,  interictal abnormalities consisting of 2 Hz generalized spike wave, and frequent independent right greater than left midtemporal spikes and occasional simultaneous bitemporal spikes  Frequent tonic seizures, atypical absence seizures and brief tonic clonic seizures are noted during sleep   All three seizure types were significantly shorter during this recording then the seizures on the previous day's recording, and the intervals between  seizures had increased    NO seizures occurred during the 73 minutes when the patient was awake  Patrica Noriega MD   Medical Director  [address] [facility]  Pager # [phone]    Oct 16 2017  9:59AM [facility] Standard Time

## 2018-01-15 NOTE — PROCEDURES
Procedures by Ophelia Galicia MD at 10/22/2017   8:18 PM      Author:  Ophelia Galicia MD Service:  Neurology Author Type:  Physician    Filed:  10/23/2017  1:05 AM Date of Service:  10/22/2017  8:18 PM Status:  Signed    :  Ophelia Galicia MD (Physician)        Procedure Orders:       1  EEG Video Monitoring 24 Hour [id number] ordered by Ophelia Galicia MD at 10/19/17 1306                  Continuous Video EEG Monitoring       Patient Name:  Josef Parmar  MRN: [id number]   :  1980 File #: Dontrell Mcclain 12-12   Age: 40 y o  Encounter #: [id number]   Start Time: 10/21/2017 16:19 End Time: 10/22/2017 08:00        Report date: 10/22/2017          Study type: Continuous video EEG    ICD 10 diagnosis: Generalized epilepsy intractable with status G40 311 and Lennox Gastaut syndrome    -------------------------------------------------------------------------------------------------------------------   Patient History: This recording was observed in a 40 y o  female  with Bailey Michael Syndrome and frequent tonic seizures to evaluate for interval change after in seizure burden  Medications include:     artificial tear 1 application Ophthalmic TID   cloBAZam 20 mg Per NG Tube HS   enoxaparin 40 mg Subcutaneous Daily   lactulose 30 g Per PEG Tube TID   levETIRAcetam 1,000 mg Per G Tube Q12H [address]   levOCARNitine 500 mg Per PEG Tube TID   melatonin 6 mg Oral HS   multivitamin-minerals 1 tablet Per G Tube Daily   ofloxacin 1 drop Both Eyes 4x Daily   Perampanel 6 mg Oral Daily   rufinamide 1,600 mg Per G Tube BID   topiramate 250 mg Per G Tube BID   Valproic Acid 250 mg Oral Q8H [address]       -------------------------------------------------------------------------------------------------------------------   Description of Procedure:  32 channel digital recording with electrodes placed according to the International 10-20 system with additional T1/T2 electrodes,  EOG, EKG, and simultaneous video   A monitoring technologist supervised the continuous recording  The recording was technically satisfactory  -------------------------------------------------------------------------------------------------------------------   Results:   Manual Review:   During wakefulness there were was no definite posteriorly dominant rhythm that attenuated with eye opening  Instead, there were diffuse low amplitude theta activities often near 5 cps, periods of relative suppression  with very low amplitude mixed activities  5 cps activities at times emerged after eye closure  During sleep better regulated 5 cps theta activities attenuated and there were very low to low amplitude poorly regulated mixed frequency theta/alpha activities  with intermittent delta and frequent spike/polyspike wave discharges  There were frequent bursts of arrhythmic generalized spike wave discharges that at times showed shifting lateralization  There were independent left and right temporal spikes  There were occasional brief bursts of 2-2 5 cps rhythmic generalized discharges  that at times coorrelated with mild eyelid myoclonia  Discharges became infrequent at times during wakefulness, but were nearly continuous during other epochs  The patient was awake during the majority of this study  Other findings:  Samples of the single channel ECG demonstrated a regular rhythm  Events/Seizures: There were frequent bursts of generalized spike/polyspike wave discharges near 1 5-2 5 cps that commonly correlated with eye opening and mild arm movements, especially when longer than 5 seconds  Some events  involvoled head drop  Occasionally there were tonic-clonic seizures or clonic-tonic seizures where a portion of the seizure involved more significant tonic extension of the arms with diffuse fast activity on the EEG   The seizures were relatively accurately  detected by automated seizure detection [facility])   The bursts typically lasted between 3 and 15 seconds, with longer events demonstrating evolution in frequency  Some events lasted up to 45 seconds  The patient was awake during most of the study and  most seizures occurred during wakefulness  Hour Seizure detections per hour Comment   16 1 VEEG started at 16:38   17 6 1 tonic-clonic seizure   18 8    19 8    20 15    21 25    22 24    23 11    0 4    1 23    2 18    3 9    4 18    5 14 5 tonic-clonic seizures   6 9 1 tonic-clonic seizure   7 3    Grand Total 196           -------------------------------------------------------------------------------------------------------------------   Interpretation: This prolonged, continuous video-EEG recording is abnormal      Background disorganization, theta frequency slowing and frequent generalized as well as independent left and right temporal epileptiform discharges are consistent with the diagnosis of Lennox Gastaut syndrome  During this 15 5 hour study there were 196 automated seizure detections that correlate very closely with bursts of spikes wave discharges representing atypical absence seizures that often involve eyelid movements and at times mild arm movemetnts  There  were about 6 tonic-clonic seizures during this recording with a cluster occurring between 5 and 6 am      Padmaja Coleman MD   [address] [facility] Giancar[place] DORSEY    Oct 23 2017  1:06AM [place] Standard Time

## 2018-01-16 NOTE — PROCEDURES
Procedures by Davonte Castillo MD at 10/26/2017  12:13 PM      Author:  Davonte Castillo MD Service:  Neurology Author Type:  Physician    Filed:  10/26/2017  4:54 PM Date of Service:  10/26/2017 12:13 PM Status:  Signed    :  Davonte Castillo MD (Physician)        Procedure Orders:       1  EEG Video Monitoring 24 Hour [id number] ordered by Davonte Castillo MD at 10/23/17 1105                  Continuous Video EEG Monitoring       Patient Name:  Chele Deal  MRN: [id number]   :  1980 File #: [id number]   Age: 40 y o  Encounter #: [id number]   Start Time: 10/23/2017 0800 End Time: 10/24/2017 08:00        Report date: 10/26/2017          Study type: Continuous video EEG    ICD 10 diagnosis: Generalized epilepsy intractable with status G40 311 and Lennox Gastaut syndrome    -------------------------------------------------------------------------------------------------------------------   Patient History: This recording was observed in a 40 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  and frequent tonic seizures to evaluate for interval change after in seizure burden  Medications include:       -------------------------------------------------------------------------------------------------------------------   Description of Procedure:  32 channel digital recording with electrodes placed according to the International 10-20 system with additional T1/T2 electrodes,  EOG, EKG, and simultaneous video  A monitoring technologist supervised the continuous recording  The recording was technically satisfactory  -------------------------------------------------------------------------------------------------------------------   Results:   Manual Review:   Manual review of the tracing was essentially unchanged from the prior day's recording  During wakefulness there were was no definite posteriorly dominant rhythm   Instead, there were diffuse low amplitude theta activities often near 5 cps,  periods of relative suppression with very low amplitude mixed, predominantly anteriorly dominant beta activities  There were frequent independent right  and left temporal spike wave discharges as well as generalized spike wave discharges  These generalized spike wave discharges often occurred in rhythmic bursts of 2-2 5 cps rhythmic generalized discharges that at times  coorrelated with mild eyelid myoclonia  During periods of sleep, theta activities attenuated and there were diffuse, low amplitude beta activities  There continued to be frequent epileptiform discharges during sleep, as described above  Other findings:  Samples of the single channel ECG demonstrated a regular rhythm  Events/Seizures:   Although no push buttons were activated, there again were innumerable seizures seen throughout the study  Tonic seizures manifested as bilateral arm stiffening  Tonic-clonic seizures would begin with a tonic  seizure, but would last longer and have clonic activity of both arms before abruptly stopping  There also were innumerable more subtle seizures that either had no clinical change or only subtle arm movements  All of these seizures were elctrographically  similar, starting with a burst of generalized slow spike wave discharges at 2-2 5 cps that was followed by generalized attenuation of activity and low amplitude rhythmic, generalized beta activity that gradually increased in frequency and amplitude to  be rhythmic alpha activity before abruptly attenuating  Additionally, there were very frequent prolonged bursts of generalized slow spike wave discharges without any clear clinical change that would last about 6-12 seconds  During samples of the tracin:00-09:00:  11 tonic and tonic-clonic seizures and 2 bursts of slow spike wave    10:00-11:00: 8 seizures with only subtle clinical changes  No tonic or tonic clonic seizures    12:00-13:00  4 seizures with only subtle clinical changes   5 bursts of slow spike wave    23:30 - 00:30  6 tonic seizures, 2 seizures with only subtle clinical changes, and 5 bursts of slow spike wave    07:00 - 08:00  6 bursts of slow spike wave no clinical seizures  -------------------------------------------------------------------------------------------------------------------   Interpretation: This prolonged, continuous video-EEG recording is abnormal  Innumerable clinical and electrographic seizures (mostly tonic, tonic-clonic, or subtle tonic seizures)  were captured, similar to the prior days studies with samples listed above  Background disorganization, theta frequency slowing and frequent generalized as well as independent left and right temporal epileptiform discharges  are consistent with the diagnosis of Lennox Gastaut syndrome  Chava Picharod MD   [address] [facility]               Received for:Rory DORSEY    Oct 26 2017  4:54PM [facility] Standard Time

## 2018-01-18 NOTE — PROCEDURES
Procedures by Trell Serna MD at 10/26/2017  12:13 PM      Author:  Trell Serna MD Service:  Neurology Author Type:  Physician    Filed:  10/30/2017  4:34 PM Date of Service:  10/26/2017 12:13 PM Status:  Signed    :  Trell Serna MD (Physician)        Procedure Orders:       1  EEG Video Monitoring 24 Hour [id number] ordered by Trell Serna MD at 10/25/17 1215                  Continuous Video EEG Monitoring       Patient Name:  Jesica Guzman  MRN: [id number]   :  1980 File #: [id number]   Age: 40 y o  Encounter #: [id number]   Start Time: 10/25/2017 0800 End Time: 10/25/2017 16:03        Report date: 10/30/2017          Study type: Continuous video EEG    ICD 10 diagnosis: Generalized epilepsy intractable with status G40 311 and Lennox Gastaut syndrome    -------------------------------------------------------------------------------------------------------------------   Patient History: This recording was observed in a 40 y o  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 and frequent tonic seizures to evaluate for interval change after in seizure burden       Medications include:   artificial tear 1 application Ophthalmic TID   cloBAZam 10 mg Per G Tube Daily   cloBAZam 20 mg Per NG Tube HS   enoxaparin 40 mg Subcutaneous Daily   lactulose 30 g Per PEG Tube TID   levETIRAcetam 1,000 mg Per G Tube Q12H [facility]   levOCARNitine 500 mg Per PEG Tube TID   melatonin 6 mg Oral HS   multivitamin-minerals 1 tablet Per G Tube Daily   ofloxacin 1 drop Both Eyes 4x Daily   Perampanel 8 mg Oral Daily   rufinamide 1,600 mg Per G Tube BID   topiramate 250 mg Per G Tube BID   Valproic Acid 250 mg Oral Q8H [facility]         -------------------------------------------------------------------------------------------------------------------   Description of Procedure:  32 channel digital recording with electrodes placed according to the International 10-20 system with additional T1/T2 electrodes,  EOG, EKG, and simultaneous video  A monitoring technologist supervised the continuous recording  The recording was technically satisfactory  -------------------------------------------------------------------------------------------------------------------   Results:   Manual Review:   Manual review of the tracing was essentially unchanged from the prior day's recording  During wakefulness there were was no definite posteriorly dominant rhythm  Instead, there were diffuse low amplitude theta activities often near 5 cps,  periods of relative suppression with very low amplitude mixed, predominantly anteriorly dominant beta activities  There were frequent independent right  and left temporal spike wave discharges as well as generalized spike wave discharges  These generalized spike wave discharges often occurred in rhythmic bursts of 2-2 5 cps rhythmic generalized discharges that at times  coorrelated with mild eyelid myoclonia  During periods of sleep, theta activities attenuated and there were diffuse, low amplitude beta activities  There continued to be frequent epileptiform discharges during sleep, as described above, and bursts of paroxysmal  fast activities  Other findings:  Samples of the single channel ECG demonstrated a regular rhythm  Events/Seizures:   Although no push buttons were activated, there again were multiple seizures seen throughout the study  No  tonic seizures or tonic-clonic seizures were seen  There continued to be subtle seizures that either had no clinical change or only subtle arm movements  All of these seizures were elctrographically similar, starting with a burst of generalized slow spike wave discharges at 2-2 5 cps that was followed by generalized attenuation of activity and low amplitude rhythmic, generalized beta activity that gradually  increased in frequency and amplitude to be rhythmic alpha activity before abruptly attenuating       Additionally, there were very frequent prolonged bursts of generalized slow spike wave discharges without any clear clinical change that would last about 6-12 seconds  During samples of the tracin:00-09:00:  No tonic and tonic-clonic seizures, 7 seizures  with subtle clinical changes, and 4 bursts of slow spike wave    10:00-11:00: No tonic seizures, one subtle seizure, No  bursts of slow spike wave  12:00-13:00  No seizures, no bursts of slow spike wave    -------------------------------------------------------------------------------------------------------------------   Interpretation: This prolonged, continuous video-EEG recording is abnormal  Multiple subtle clinical and electrographic seizures (with subtle arm  movements) were captured, overall decreased in frequency compared to the prior days recording with samples listed above  Background disorganization, theta frequency slowing and frequent  generalized as well as independent left and right temporal epileptiform discharges are consistent with the diagnosis of Lennox Gastaut syndrome  Puja Blue MD   [address] [place] [address] [facility]               Received for:Rory DORSEY    Oct 30 2017  4:34PM [facility] Standard Time

## 2018-01-19 ENCOUNTER — DOCTOR'S OFFICE (OUTPATIENT)
Dept: URBAN - NONMETROPOLITAN AREA CLINIC 1 | Facility: CLINIC | Age: 38
Setting detail: OPHTHALMOLOGY
End: 2018-01-19
Payer: COMMERCIAL

## 2018-01-19 ENCOUNTER — RX ONLY (RX ONLY)
Age: 38
End: 2018-01-19

## 2018-01-19 DIAGNOSIS — H01.002: ICD-10-CM

## 2018-01-19 DIAGNOSIS — H01.001: ICD-10-CM

## 2018-01-19 DIAGNOSIS — H10.501: ICD-10-CM

## 2018-01-19 PROCEDURE — 99203 OFFICE O/P NEW LOW 30 MIN: CPT | Performed by: OPHTHALMOLOGY

## 2018-01-19 ASSESSMENT — REFRACTION_CURRENTRX
OS_OVR_VA: 20/
OD_OVR_VA: 20/

## 2018-01-19 ASSESSMENT — REFRACTION_MANIFEST
OS_VA2: 20/
OU_VA: 20/
OD_VA3: 20/
OD_VA3: 20/
OS_VA1: 20/
OS_VA1: 20/
OD_VA2: 20/
OD_VA1: 20/
OS_VA3: 20/
OU_VA: 20/
OS_VA2: 20/
OS_VA2: 20/
OD_VA2: 20/
OS_VA3: 20/
OD_VA1: 20/
OD_VA3: 20/
OS_VA3: 20/
OD_VA1: 20/
OU_VA: 20/
OD_VA2: 20/
OS_VA1: 20/

## 2018-01-19 ASSESSMENT — VISUAL ACUITY
OD_BCVA: 20/
OS_BCVA: 20/

## 2018-01-19 ASSESSMENT — LID EXAM ASSESSMENTS: OD_BLEPHARITIS: RLL RUL 2+ 3+

## 2018-01-22 ENCOUNTER — ALLSCRIPTS OFFICE VISIT (OUTPATIENT)
Dept: OTHER | Facility: OTHER | Age: 38
End: 2018-01-22

## 2018-01-22 DIAGNOSIS — G40.812 LENNOX-GASTAUT SYNDROME, NOT INTRACTABLE, WITHOUT STATUS EPILEPTICUS (HCC): ICD-10-CM

## 2018-01-22 DIAGNOSIS — E72.20 DISORDER OF UREA CYCLE METABOLISM (HCC): ICD-10-CM

## 2018-01-23 NOTE — PROGRESS NOTES
Assessment   1  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with tonic seizures (345 10) (G40 812)   2  Hyperammonemia (270 6) (E72 20)   3  Elevated liver enzymes (790 5) (R74 8)   4  Lethargy (780 79) (R53 83)   5  Epilepsy undetermined as to focal or generalized, intractable (345 91) (G40 919)    Plan    Epilepsy undetermined as to focal or generalized, intractable    · Fycompa 8 MG Oral Tablet   Rx By: Latisha Watts; Dispense: 30 Days ; #:30 Tablet; Refill: 5;For: Epilepsy undetermined as to focal or generalized, intractable; TOBIAS = N; Print Rx   · From  Banzel 400 MG Oral Tablet TAKE 4 TABLETS TWICE A DAY To Banzel    400 MG Oral Tablet TAKE 3 TABLET Every twelve hours   Rx By: Latisha Watts; Dispense: 30 Days ; #:180 Tablet; Refill: 3;For: Epilepsy undetermined as to focal or generalized, intractable; TOBIAS = N; Print Rx   · Topiramate 50 MG Oral Tablet; Give by PEG 1 tab (with one 200mg tab) twice a    day   Rx By: Latisha Watts; Dispense: 30 Days ; #:60 Tablet; Refill: 3;For: Epilepsy undetermined as to focal or generalized, intractable; TOBIAS = N; Print Rx; Msg to Pharmacy: total topiramate dose 250mg twice a day  Epilepsy undetermined as to focal or generalized, intractable, 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with tonic seizures    · From  LevETIRAcetam 100 MG/ML Oral Solution SWALLOW 10 ML Twice daily    To LevETIRAcetam 100 MG/ML Oral Solution Take 10ml (1,000mg total) by PEG every 12    hours   Rx By: Latisha Watts; Dispense: 30 Days ; #:600 ML; Refill: 3;For: Epilepsy undetermined as to focal or generalized, intractable, 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with tonic seizures; TOBIAS = N; Record   · Onfi 20 MG Oral Tablet   Rx By: Latisha Watts; Dispense: 0 Days ; #:30 Tablet;  Refill: 5;For: Epilepsy undetermined as to focal or generalized, intractable, 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with tonic seizures; TOBIAS = N; Print Rx; Msg to Pharmacy: Onfi 10mg in AM and 20mg in PM   · From  Fycompa 0 5 MG/ML Oral Suspension SWALLOW 16 ML Bedtime To Fycompa 0 5 MG/ML Oral Suspension TAKE 20 ML Bedtime   Rx By: Tati Brooks; Dispense: 30 Days ; #:600 ML; Refill: 3;For: Epilepsy undetermined as to focal or generalized, intractable, 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with tonic seizures; TOBIAS = N; Print Rx; Msg to Pharmacy: Discontinue Fycompa 8mg tabs  · From  Onfi 10 MG Oral Tablet Give by G-tube, one tab in morning To Onfi 10    MG Oral Tablet Give by G-Tube, one tab at bedtime   Rx By: Tati Brooks; Dispense: 30 Days ; #:30 Tablet; Refill: 3;For: Epilepsy undetermined as to focal or generalized, intractable, 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with tonic seizures; TOBIAS = N; Print Rx; Msg to Pharmacy: Discontinue morning dose of Onfi   · From  Valproic Acid 250 MG/5ML Oral Solution Give by G-tube 5 ML Every 8    hours To Valproic Acid 250 MG/5ML Oral Solution Give by G-tube 10 ML Every 8 hours   Rx By: Tati Brooks; Dispense: 30 Days ; #:900 ML; Refill: 3;For: Epilepsy undetermined as to focal or generalized, intractable, 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with tonic seizures; TOBIAS = N; Print Rx   · Topiramate 200 MG Oral Tablet (Topamax); TAKE 1 TABLET (with 50mg tab) by    G-tube TWICE DAILY   Rx By: Tati Brooks; Dispense: 30 Days ; #:60 Tablet; Refill: 3;For: Epilepsy undetermined as to focal or generalized, intractable, 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with tonic seizures; TOBIAS = N; Print Rx; Msg to Pharmacy: total dose 250mg twice a day   · (1) LAMICTAL; Status:Canceled;    Perform:[facility];Ordered;For:Epilepsy undetermined as to focal or generalized, intractable, 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with tonic seizures; Ordered By:Larry España Hearing;  Hyperammonemia, 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with tonic seizures    · (1) AMMONIA; Status:Active; Requested for:22Jan2018;     Perform:[facility]; QLL:[id number];SERGIO:[id number], 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with tonic seizures; Ordered By:Larry España Hearing;  Hyperammonemia, On valproic acid therapy    · LevOCARNitine 1 GM/10ML Oral Solution; Give by G-tube 5 ML 3 times daily   Rx By: Yoel Cha; Dispense: 30 Days ; #:450 ML; Refill: 5;For: Hyperammonemia, On valproic acid therapy; TOBIAS = N; Print Rx  Intellectual disability    · (1) LAMICTAL; Status:Canceled;    Perform:[facility];Ordered; For:Intellectual disability; Ordered By:Vidhya Kaiser;   · (Q) CLOBAZAM (URBANYL); Status:Canceled;    Perform:[facility]; Due:[id number]; Last Updated By:Angelina España; 1/22/2018 8:56:47 AM;Ordered; For:Intellectual disability; Ordered By:Vidhya Kaiser;  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with tonic seizures    · (1) COMPREHENSIVE METABOLIC PANEL; Status:Active; Requested for:22Jan2018; Perform:[facility]; LXB:[id number]; For: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with tonic seizures; Ordered By:Angelina España;   · (1) LEVETIRACETAM ( KEPPRA); Status:Active; Requested for:22Jan2018; Perform:[facility]; HDY:[id number]; For: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with tonic seizures; Ordered By:Angelina España;   · (1) TOPIRAMATE; Status:Active; Requested for:22Jan2018; Perform:[facility]; OQF:[id number]; For: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with tonic seizures; Ordered By:Angelina España;   · (1) VALPROIC ACID (DEPAKOTE); Status:Active; Requested for:22Jan2018; Perform:[facility]; HVR:[id number]; For: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with tonic seizures; Ordered By:Angelina España;   · (1) VALPROIC ACID, FREE; Status:Active; Requested for:22Jan2018; Perform:[facility]; YJP:[id number]; For: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with tonic seizures; Ordered By:Angelina España;   · (Q) MISCELLANEOUS REFERRAL; Status:Active; Requested for:22Jan2018; Perform:[facility]; Order Comments:Perampanel level, serum; Due:22Nov2018; Ordered; For: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with tonic seizures; Ordered By:Sam España;  Unlinked    · Lactulose 10 GM PACK   Dispense: 0 Days ; #: Sufficient PACK; Refill: 0; TOBIAS = N; Record;  Last Updated By: Tati Brooks; 1/22/2018 8:56:46 AM      Follow-up visit in 3 months Evaluation and Treatment  Follow-up SO  Status: Hold For - Scheduling  Requested for: [id number]     Ordered; For: Hyperammonemia, 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with tonic seizures;  Ordered By: Tati Brooks  Performed:   Due: [id number]       Discussion/Summary   Discussion Summary:    Ms Suzanne Kraft is a 40year old woman with Lennox Gastaut Syndrome, remote history of anoxic brain injury, h/o status epilepticus, severe intellectual disability  She has a VNS due to intractable epilepsy  Her level of awareness fluctuates between alertness and excessive somnolence  Which may in part be due to her variable hyperammonemia or current dose of Onfi  Based on prior history of medication changes and missed doses it seems that Ms Bull needs at least topiramate and valproic acid to prevent seizures  She had an increase seizure frequency when Fycompa was held  It is unclear if levetiracetam or Banzel has offered much in the way of seizure prevention  Ul  Rexjulissa NICOjohnarslan 150 is likely causing excessive somnolence  is postulated that the excessive ammonia level is due to abnormal mitochondrial activity with valproate exposure  One possible treatment is with supplementation with L-carnitine that helps with transport of long chain fatty acids to aid in brain mitochondrial energy metabolism  Due to the inability to discontinue Valproic acid and hyperammoniemia, she needs L-carnitine supplementation  Ban Ellsworth has intractable epilepsy, unlikely to achieve seizure freedom and she is not a surgical candidate due to the underlying generalized seizures that she is experiencing  We can attempt to decrease medications but this increases the chance increasing seizure frequency     - continue with Valproic acid 250mg/5mL give 10mL three times a day - continue with Banzel 400mg give 3 tabs twice a day - continue with topiramate 50mg and 200mg tab, one each twice a day - continue with Levetiracetam 100mg/mL give 10mL twice a day - discontinue Fycompa 8mg tabs; start Fycompa 0 5mg/mL oral solution give 20mL (10mg) at bedtime - discontinue Onfi morning dose, decrease Onfi to 10mg at bedtime - continue with levocarnitine 1gm/10mL give 5mL three times a day - check ammonia, comprehensive metabolic, total and free valproic acid levels, levetiracetam, topiramate, perampanel levels - follow-up in 3 months - keep track of recurrent seizures, frequency of tonic seizures, excessive sedation      Counseling Documentation With Imm: The patient, patient's caretaker was counseled regarding prognosis,-- impressions,-- risks and benefits of treatment options  Decision making was of high-complexity due to the patient's high risk condition (seizures), psychiatric and neuropsychological comorbidities, behavioral problems, memory and cognitive problems and medication side effects  total amount of time spent with the patient was 47 minutes  More than 50% of this time was devoted to counseling and coordination of care  Issues addressed during this clinic visit included overall management, medication counseling or monitoring (including adverse effects, side effects and risks of antiepileptic medications)  Started: 8:20AM Ended: 9:07AM          Chief Complaint   Chief Complaint Free Text Note Form: Patient present for neurology follow up regarding tonic seizures and epilepsy  History of Present Illness     Ms Ad Tomlin is a 40year old woman with Lennox Gastaut Syndrome possibly due to anoxic brain damage, severe developmental delays, impulse control disorder here for post-hospitalization follow-up evaluation  There is very little information in the way of developmental / life history  Interval history 1/22/2018     Since the last visit, Ms Yogi Gaviria had a couple of admissions to hospital for altered mental status found to have pneumonia versus excessive seizures versus medication side effects  She was put on continuous EEG monitoring and was found to still have frequent tonic seizures while asleep  She required higher doses of VPA to control seizures and lower dose of Onfi to help with excess sedation  Banzel was also decreased  On the last day of cvEEG 12/6/2017 - there were still many brief tonic seizures nearly all out of sleep (consisting of rhythmic generalized spike slow waves and paroxysmal fast activity)  I spoke with Ann Hardwick nurse supervisor  Regarding behaviors, there were a couple of times she was trying to get out of bed, but for the most part she is okay  She has occasional small arm jerking activity  Her sleep is generally good with naps throughout the day  There is no ambulation, she can sit up but that is all she is able to do  AED/side effects/compliance:     Banzel 1200mg twice a day     Fycompa 8mg daily     Levetiracetam oral solution 1000mg twice a day     Onfi 5mg in AM and 15mg in PM     Topiramate 250mg BID      Valproic acid 50mg q8hrs     Levocarnitine 500mg TID        HPI: Collective epilepsy history 11/6/2014 was previously evaluated by Dr Nydia Singer including a hospitalization in February 2013 for status epilepticus, in the setting of missed doses of AEDs due to refusal to eat  She subsequently required anesthetic induced coma to stop her seizures and PEG tube was placed  She was seen almost every month for management of her seizures up until November 2013  She has medically refractory seizures and had a VNS placed possibly in the early 2000s and a generator changed in 2011  Unknown AEDs that were tried but felbamate is listed as an allergy  In 2011, her AEDs were clonazepam, levetiracetam, Depakote and Lamictal  Dr Nydia Singer had started [facility] in 2011   In 2012, Nahid Gallardo 150 was added with the reduction of clonazepam  There were difficulty with documenting seizure frequency; but seizures were no longer daily occurrences but there were clusters of seizures  There would be good periods and bad periods of seizure frequency  Dr Andressa Dillard had been increasing the duty cycle of the VNS over the course of 2012 to 2013  Sometime between August 2013 and October 2013, topiramate was introduced  was in status epilepticus in 2013, she was on Keppra, Banzel, Onfi, and Lamictal  She had an EEG at some point that showed multifocal spike-wave and background attenuation  She has intermittent agitation and day time somnolence  When she was hospitalized for status epilepticus, she was started on methylphenidate to help wake her up    battery was replaced in 2011    history November 2014: level 127  Her Valproic Acid dose was previously 250mg q6hrs based on Dr Blanka Christensen notes  Since July 2014, she has been receiving VPA 500mg q6hrs  She has not been hospitalized since September 2013  She was previously ambulatory with therapy  She spends most of her time sleeping for the past couple of months  She has also been receiving lactulose for elevated ammonia levels  clear seizures or reports of drop attacks in last 3 months  drugs VPA, LVT, LMG, newer drugs added on since 2011 RFN, Onfi, TPM)   of 2015: decreased VPA from TDD 2000mg to 1000mg with increased agitation/combativeness, alternating days of exhaustion (no behavioral specialist has been involved)  are randomly reported by multiple staff members  Seizures are typically reported by CNAâs or her one to one on the 3PM to 11PM shift  Ángel Ivan her usual nurse who I spoke to over the phone, has not witnessed any seizures herself  She did witness the very first seizure Jay Cabral had when she was admitted to the home, which was a generalized convulsion  These are described a brief events of eyes rolling back and arms going up in the air, followed by hair pulling or slapping herself  These seizures were reported as either sporadic or every other day episodes  have been no documented grand mal seizures or drop attacks   She refuses to wear safety helmet, rips at her hair, and attempts to flip herself out of her chair and bed  They have tried to redirect her and lorazepam is given as needed for aggression but does not respond  These behaviors are worsened during her menses  Her menstrual cycle is irregular and can be heavy  Marry Holley can walk briefly but walks on her toes, she frequently will allow herself to fall down when she does not want to walk  does not appear that methylphenidate has been useful in maintaining her level of wakefulness during the day  October 2015, her VNS was very near end of service  Since the last visit there have not been reported seizures, convulsions, loss of awareness, or drop attacks  Marry Holley had her VNS generator change on 11/3/2015  The Model 103 (serial [id number]) was replaced with [employer] Model 105, serial [id number]  Her VNS setting was changed to deliver lower current  made: reduced from 1 75mA to 1 25mA with 30/500/30/1 8 output reduced from 2 to 1 5mA  are no reported seizures or worsening drop attacks     have started to wean her off of levetiracetam due to multiple medications and agitation; by the end of 2016, she was down to -500     of 2016 with RFN [id number], -250, CLB 0 5-0 5-25,  QID, -200 attempted to wean off of LVT and reduced the dose of VPA given that there were no reports of seizures and she was sedated, along with elevated ammonia levels  It was unclear as to how effective LVT was for her seizure control  When she missed a dose of TPM in September 2016, she had multiple seizures  We attempted to compensate for increase in seizures by increasing Onfi to 20-20; however, it seems that it made her more sedated  are periods of agitation mostly associated with menstrual cycle  She attempts to fight, abdominal pain, and flipping out of chair  Marry Holley is generally calm, playful, and she does ambulate from time to time   When she is defiant she would throw herself to the floor  She goes through spurts of wanting to sleep all day especially when she is the middle of her menstrual period, which typically last 5-7 days and is very heavy  history 4/7/2017 [id number], -250, Onfi 20-20, -200,  q8hrs  I spoke with Emil Barrientos from [address]  Since the last visit, Ms Tay Perez has been more somnolent, less interactive, constantly sleeping, she appears to be sedated, and she does not eat  She has been losing about 1-2 pound a day  Her current weight is 86kg  She will not walk  When asked about periods of agitation and aggressive behavior it appears to be erratic (there was an ED visit for agitation in February)  She gets weekly blood work for ammonia levels which has been between  (3/13/2017 was 121)  She has been getting lactulose 4 times a day  She continues to have tonic seizures when she is asleep, eyes rolling body, arms will tense up with some twitching, last a few seconds, but will recur  There are no seizures during the daytime  She was seen in the ED on 2/27/2017 for breakthrough seizures but apparently it was due to missed doses of medications as she was discharged from the hospital for treatment of pneumonia (2/22-2/27), and the pharmacy did not receive the medication orders in time for her seizure medications to be administered  history 6/5/2017 [id number], -250, CLB 20-20, -200, -250  We had decreased Valproic acid to 250mg twice a day and there has been an increase in the frequency of tonic seizures mostly while the patient is asleep and around the time of the next dose of her medications (early evening or early morning)  These seizures consist of tonic stiffness, eyes rolling upwards, and subtle arm (right?) jerking  These seizures appear to cluster such as multiple tonic seizures lasting a few seconds within a 30 minute period  However, there has been no convulsion   She continues to have erratic sleep and periods of wakefulness  There is a standing order for her to wear a helmet when she is walking; however, she has been consistently refusing to wear the helmet or it is difficult to keep it on her  She has a constant 1:1 supervision and they use safety mats at the nursing home  There has only been one fall in the last year  The nurses are requesting that the helmet can be discontinued  history 11/7/2017 [id number], PER 8, LVT 4530-4235, CLB 20-20, -250,  q8, She was in [facility] from 10/10 to 10/26/2017  During her admission, she had seizures and was put on continuous video EEG monitoring to determine her seizure type, seizure frequency, and rapid medication adjustments  The increased in seizure frequency was likely due to an underlying infection, but also she likely had frequent seizures during sleep which did not cause much injury to the patient  A number of medication adjustments were attempted including discontinuation of lamotrigine due to concern about it worsening seizures, reinstitution of levetiracetam, attempted discontinuation of valproic acid, attempted reduction in Onfi, and starting Fycompa  The patient's seizures were mostly based during sleep, tonic seizures  When valproic acid was discontinued there was an increased number of seizures  Valproic acid was subsequently reinstituted  Patient was discharged on reduced dose of Onfi to keep her more awake during the daytime however this instruction was not forwarded to her nursing home  There does not seem to be a change in degree of alertness or activity with these current medications  Her sleep cycle has always been erratically   When she is awake she is able to say some words, moan or grunt, interact with caretakers, and is ambulatory with one person assist fairly able to ambulate and sometimes is very playful with ambulation such as lifting both feet up so that her caretakers and carrying her    semiology: She was described to have drop attacks or spells with grunting sounds and falling to the floor (none reported)  Her nurse commented on episodes of tonic seizures, eyes rolling up body tensing up with right arm raised up in the air (daily multiple seizures)   generalized convulsive seizures (last one 2017 in the setting of missed doses of medications; another one in 2016, when her topiramate level was undetected)  Features epilepticus: yes Injury Seizures: No Factors: menstrual cycle? ? Risk Factors: pregnancy: unknown birth/: unknown Development: unknown developmental history seizures, simple: unknown seizures, complex: unknown infection: No retardation: Yes palsy: Yes injury (moderate/severe): possibly anoxic brain injury neoplasm: No malformation: No procedure: No No abuse: No abuse: No history Sz/epilepsy: unknown   AEDs: Clobazam (excessive sedation), Clonazepam, Levetiracetam (discontinued to to polypharmacy and unclear efficacy and agitation), Lamotrigine, Topiramate (breakthrough seizures when discontinued), valproate (breakthrough seizures when discontinued) to Felbamate          Review of Systems   unable to obtain review of systems due to cognitive impairment    Neurological ROS:      Psychiatric: anxiety  Active Problems   1  ADHD, predominantly inattentive type (314 00) (F90 0)   2  Anoxic brain damage, not elsewhere classified (348 1) (G93 1)   3  Elevated liver enzymes (790 5) (R74 8)   4  Epilepsy undetermined as to focal or generalized, intractable (345 91) (G40 919)   5  Excessive daytime sleepiness (780 54) (G47 19)   6  Hyperammonemia (270 6) (E72 20)   7  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with tonic seizures (345 10) (G40 812)   8  Lethargy (780 79) (R53 83)   9  On valproic acid therapy (V58 69) (Z79 899)   10  Osteoporosis (733 00) (M81 0)    Past Medical History   1  History of Clostridium difficile enteritis (008 45) (A04 72)   2   History of pneumonia (V12 61) (Z87 01)  Active Problems And Past Medical History Reviewed: The active problems and past medical history were reviewed and updated today  Surgical History   1  History of Percutaneous Placement Of Gastrostomy Tube   2  History of Placement Of Intracranial Neurostimulator Pulse Generator    Family History   Mother    1  No pertinent family history      unable to obtain       Social History    · Living In [address] [place] [address] Permanently   · Never A Smoker   · Never Drank Alcohol   · Never Used Drugs  Social History Reviewed: The social history was reviewed and is unchanged  Living situation:  [facility]  home, [facility]     Tobacco:  No tobacco use      Alcohol:  No alcohol use  Drugs:  No illegal drug use              Current Meds    1  Acetaminophen 325 MG Oral Tablet; TAKE 2 TABLET Every 6 hours PRN via G-Tube; Therapy: (Recorded:11Sep2017) to Recorded   2  Banzel 400 MG Oral Tablet; TAKE 4 TABLETS TWICE A DAY; Last Rx:07Nov2017 Ordered   3  Bisac-Evac 10 MG Rectal Suppository; INSERT 1 SUPPOSITORY RECTALLY AT     BEDTIME PRN; Therapy: (Recorded:11Sep2017) to Recorded   4  Centrum Silver Oral Tablet; Give one tablet via G-Tube one time a day; Therapy: ([id number]) to Recorded   5  Certa-Javi LIQD; GIVE 1 TABLET VIA G-TUBE ONE TIME A DAY; Therapy: (Recorded:11Sep2017) to Recorded   6  CertaVite Senior/Antioxidant Oral Tablet; Give one tablet via G-Tube one time a day; Therapy: ([id number]) to Recorded   7  Ciloxan 0 3 % Ophthalmic Solution; Drop in right eye four times a day for right eye     drainage; Therapy: [phone]) to Recorded   8  Fleet Bisacodyl 10 MG/30ML Rectal Enema; INSERT 1 APPLICATOR RECTALLY AS     NEEDED AT BEDTIME; Therapy: (Recorded:11Sep2017) to Recorded   9  Fycompa 8 MG Oral Tablet; Give one tablet via G-Tube one time a day for Seizures; Last     Rx:07Nov2017 Ordered   10  Lactulose 10 GM PACK; Give 1 packet via G-Tube two times a day;       Therapy: ([id number]) to Recorded   11  Lactulose 20 GM/30ML Oral Solution; give 45 mL three times a day; Therapy: [id number] to Recorded   12  LevETIRAcetam 100 MG/ML Oral Solution; SWALLOW 10 ML Twice daily; Therapy: (Jair Covington) to Recorded   13  LevOCARNitine 1 GM/10ML Oral Solution; Give by G-tube 5 ML 3 times daily; Last      Rx:07Nov2017 Ordered   14  Melatonin 3 MG Oral Tablet; TAKE 2 TABLET Bedtime; Therapy: (Jair Covington) to Recorded   15  Milk of Magnesia 7 75 % Oral Suspension; GIVE 30ML BY MOUTH AT BEDTIME AS      NEEDED; Therapy: (Recorded:11Sep2017) to Recorded   16  Ondansetron 4 MG Oral Tablet Disintegrating; GIVE 4 MG VIA G-TUBE EVERY 8 HOUR      FOR NAUSEA; Therapy: ([id number]) to Recorded   17  Onfi 10 MG Oral Tablet; Give by G-tube, one tab in morning; Therapy: [id number] to (Evaluate:06May2018); Last Rx:07Nov2017 Ordered   18  Onfi 20 MG Oral Tablet; Give by G-tube 1 tab at bedtime; Last Rx:07Nov2017 Ordered   19  Potassium Chloride 20 MEQ/15ML (10%) LIQD; SWALLOW 30 ML Daily; Therapy: (Jair Covington) to Recorded   20  Chrissie-Bid Probiotic TABS; Take 1 tablet daily; Therapy: (Jair Covington) to Recorded   21  Senexon TABS; TAKE 1 TABLET Daily via G-tube; Therapy: (Recorded:11Sep2017) to Recorded   22  Systane Nighttime Ophthalmic Ointment; INSTILL 1 GRAM IN BOTH EYES THREE TIMES      A DAY FOR DRY EYES;      Therapy: (Recorded:11Sep2017) to Recorded   23  Tobramycin-Dexamethasone 0 3-0 1 % Ophthalmic Suspension; Instill 1 drop in right      eye every 2 hours; Therapy: (Jair Covington) to Recorded   24  Topiramate 200 MG Oral Tablet; TAKE 1 TABLET (with 50mg tab) by G-tube TWICE DAILY; Last Rx:07Nov2017 Ordered   25  Topiramate 50 MG Oral Tablet; Give by PEG 1 tab (with one 200mg tab) twice a day; Therapy: [id number] to (Evaluate:06May2018); Last Rx:07Nov2017 Ordered   26   Valproic Acid 250 MG/5ML Oral Solution; Give by G-tube 5 ML Every 8 hours; Therapy: 07Apr2017 to (Evaluate:[id number]); Last Rx:07Nov2017 Ordered    Allergies   1  Felbatol TABS    Vitals   Signs   Recorded: [id number] 08:03AM   Heart Rate: 77  Respiration: 14  Systolic: 91, LUE, Supine  Diastolic: 54, LUE, Supine  Height: 5 ft 3 in  Weight: 84 lb   BMI Calculated: 14 88  BSA Calculated: 1 34    Physical Exam   GENERAL: no engagement by the patient, excessively sedated     Eyes: Anicteric, dysconjugate gaze     Carotids: n/a     Abdomen: soft nontender          MENTAL STATUS     Orientation: lethargic, no verbal out put     Fund of knowledge: not testable     Attention/concentration: not testable     Recent/remote memory: not testable     Language: no verbalizations          OPHTHALMOSCOPIC     Fundus/Optic discs/Posterior segments: n/a          CRANIAL NERVES     II: PERRL  III, IV, VI: unable to test dysconjugate gaze  V: unable to assess     VII: face appears to be symmetric     VIII: unable to assess     IX, X: unable to test     XI: unable to test     XII: unable to test          MOTOR (Upper and lower extremities)      Bulk/tone/abnormal movement: patient's tone is flaccid throughout, muscle bulk is less than normal     Drift: left arm weaker than right     Strength: unable to assess           COORDINATION      F/N: unable to test     FFM: unable to test     CARTER: unable to test     Station/Gait: too sedated          SENSORY     Withdraw to noxious stimulation     Romberg unable to test          Reflexes:  n/a             Results/Data   Diagnostic Studies Reviewed:      CT Scan Review CT head 2/21/2013 CT of brain            Diagnostic Review EEG: video EEG monitoring 10/13 â 10/15/2017 generalized spike/polyspike-slow wave complexes during sleep times there are independent right more than left midtemporal spikes and bitemporal spikes   generalized tonic seizures during sleep, generalized 1 Hz period discharges, that would become continuous for 30 seconds or generalized rhythmic alpha-beta discharges, associated with patient becoming rigid, tonic posturing with arms elevated and tense with subtle jerking of the upper body, eyes opening with upward deviation  dePadua reported 4 ictal patterns: â 1-3 seconds of diffuse electrodecrement then 2-7 seconds of fast activity then 2Hz spike wave discharges (no clinical manifestation) â same as #1, clinically accompanied by posturing of the arms, then clonic jerking â diffuse low fast activity without progressing to spike-wave discharges â 2 Hz generalized discharges for 2-3 minutes with no clonical manifestations 10/15/2017 â the tonic clonic seizures were less frequent   video EEG monitoring 10/18 â 10/25/2017 background slowing with bursts of arrhythmic generalized spike wave discharges, with shifting lateralization, and independent left and right temporal spikes eyelid myoclonia associated with brief bursts of 2-2 5 Hz generalized discharges seizures with electrodecrement were innumerable tonic seizures; however by 10/25/2017 the frequency of these seizures did decrease in frequency  video EEG monitoring 12/1-12/5/2017 are innumerable tonic seizures that are generally less than one minute in duration (30-40 seconds), these consists of subtle eye opening and tonic stiffening of arms, if longer then whole body stiffening with clonic jerking movements  These almost always occur exclusively out of sleep  These consist of 2-2 5 Hz generalized spike-slow wave complexes with generalized attenuation of activity (desynchronization) or paroxysmal fast activity  Per 24 hours count of seizures could be   possible improvement after valproate was increased  6 5 8 2 acid 46   free 19 3 91 19 1   9 3/11 8/37/184 148/3 8/116/26/19/0 5/137 76 67            Procedure   VNS Interrogation with reprogramming:     Model 105     SN [id number]     Implanted 11/3/2015     Interrogation Settings: Output 2 00mA, Signal frequency 30Hz, Pulse Width 500 microsec, on time 21 sec, off time 0 8 minute     Mag setting Output 2 25mA, Signal on 60 sec, pulse width 500 microsec          System diagnostic     Communication OK     Outpt Current OK     Lead impendence OK, 1760 Ohms     IFI No (50% battery)          Reprogrammed Settings:     Output 2 00mA, Signal frequency 30Hz, Pulse Width 500 microsec, on time 14 sec, off time 0 5 minute     Mag setting Output 2 25mA, Signal on 60 sec, pulse width 500 microsec             Signatures    Electronically signed by : PHYLLIS Crews ; Jan 22 2018 12:57PM EST                       (Author)

## 2018-01-23 NOTE — PROCEDURES
Procedures by Minal Elliott MD at 2017   2:50 PM      Author:  Minal Elliott MD Service:  Neurology Author Type:  Physician    Filed:  2017  2:59 PM Date of Service:  2017  2:50 PM Status:  Signed    :  Minal Elliott MD (Physician)        Procedure Orders:       1  EEG Video Monitoring 24 Hour [id number] ordered by Minal Elliott MD at 17 1046                  Continuous Video EEG Monitoring       Patient Name:  Teresita Garcia  MRN: [id number]   :  1980 File #: [id number]   Age: 40 y o  Encounter #: [id number]          Report date: 2017          Study type: Continuous video EEG    ICD 10 diagnosis: Generalized epilepsy intractable with status G40 311    Start time: 12/3/2017: 08:00 End time: 2017: 07:59     -------------------------------------------------------------------------------------------------------------------   Patient History: This recording was observed in a 40 y o  female  with [place] Syndrome with intractable seizures to determine frequency of seizures and guide therapy       Medications include:     artificial tear 1 application Ophthalmic TID   cloBAZam 15 mg Per G Tube HS   cloBAZam 5 mg Oral Daily   enoxaparin 40 mg Subcutaneous Daily   lactulose 35 g Per G Tube TID   levETIRAcetam 1,000 mg Per G Tube Q12H [address]   levOCARNitine 1,000 mg Per G Tube BID With Meals   melatonin 6 mg Per G Tube HS   multivitamin with iron-minerals 15 mL Per G Tube Daily   Perampanel 8 mg Per G Tube Daily   potassium chloride 40 mEq Per G Tube Daily   rufinamide 1,200 mg Per G Tube BID   saccharomyces boulardii 250 mg Per G Tube BID   senna-docusate sodium 1 tablet Per G Tube Daily   topiramate 250 mg Per G Tube BID   Valproic Acid 500 mg Per G Tube Q8H       -------------------------------------------------------------------------------------------------------------------   Description of Procedure:  ·  32 channel digital recording with electrodes placed according to the International 10-20 system with additional  T1/T2 electrodes, EOG, EKG, and simultaneous  video  A monitoring technologist supervised the continuous recording  The recording was technically satisfactory  -------------------------------------------------------------------------------------------------------------------   Results:   Manual Review:   Manual review of the tracing was essentially unchanged from the prior day's recording  No age or state appropriate activities were seen  Instead, background activities  consisted of reactive, generalized, poorly organized, predominantly theta and delta activities with mixed overriding  faster activities  There were frequent parindependent right and left temporal spike wave discharges as well as generalized spike wave discharges  These generalized spike wave discharges  occasionally occurred in runs of generalized slow spike and wave discharges  There were very frequent generalized tonic and generalized tonic clonic seizures seen over the course of the tracing, as detailed below  During periods of relative wakefulness on EEG, there were long runs of centrally dominant, low amplitude 5-6 cps theta activities  Other findings: The single lead ECG demonstrated a regular rhythm  Events:   No push buttons were activated  Although no push buttons were activated, a total of 55 seizures were captured over the course of the tracing, as below  Seizures again clinically manifested with initial eye opening,  occasionally followed by brief tonic stiffening of arms  No tonic clonic seizures were seen and seizures were briefer than the prior day's recording  See below for hourly breakdown of seizures  These seizures again occurred almost exclusively out of sleep and typically lasted around 30-40 seconds   All of these seizures were electrographically  similar, starting with a burst of generalized slow spike wave discharges at 2-2 5 cps that was followed by generalized attenuation of activity and low amplitude rhythmic, generalized beta activity that gradually increased in frequency and amplitude to  be rhythmic alpha activity before abruptly attenuating  08:00-09:00: 10 seizures total  (5 tonic, and 5 with subtle or no clinical change)  09:00-10:00: 9 seizures total  (1 tonic, and 8  with subtle or no clinical change)  10:00-11:00: 4 seizures total  (4 tonic)  11:00-12:00: 3 seizures total  (2 tonic, and 1  with subtle or no clinical change)  12:00-13:00: 3 seizures total  (2 tonic, and 1  with subtle or no clinical change)  13:00-17:00: 0 seizures  17:00- 18:00: 1 tonic seizure  18:00 - 23:00: 0 seizures total    23:00 - 00:00: 1 tonic seizure  00:00 - 01:00: 2 seizures total  (2 with subtle or no clinical change )  01:00 - 02:00: 0 seizures  02:00-03:00: 1 seizure with subtle clinical change  03:00- 04:00: 2 seizures total  (2 with subtle or no clinical change)  04:00-05:00: 6 seizures total  (5 tonic, and 1 with subtle or no clinical change)   05:00-06:00: 4 seizures total  (4 tonic)  06:00-07:00: 8 seizures total  (8 tonic)  07:00-08:00: 1 tonic seizure    -------------------------------------------------------------------------------------------------------------------   Interpretation: This prolonged, continuous video-EEG recording captured a total of 55 generalized tonic seizures, seizures with only  subtle clinical changes, or no clinical change  Background activities are disorganized and too slow suggesting moderate to severe diffuse bilateral cerebral dysfunction  Presence of background slowing, generalized spike wave discharges, multifocal spike wave discharges and runs of slow spike wave discharges are consistent with patient's known diagnosis of Lennox Gastaut syndrome  Further monitoring is suggested to help evaluate progress in treatment        Roxie Beyer MD   [address] [facility] [place]    Dec  6 2017  3:00PM [facility] Standard Time

## 2018-01-23 NOTE — PROCEDURES
Procedures by Levar Lares MD at 2017   7:29 AM      Author:  Levar Lares MD Service:  Neurology Author Type:  Physician    Filed:  2017  7:40 AM Date of Service:  2017  7:29 AM Status:  Signed    :  Levar Lares MD (Physician)        Procedure Orders:       1  EEG Video Monitoring 24 Hour [id number] ordered by Levar Lares MD at 17 0913                  Continuous Video EEG Monitoring       Patient Name:  Gudelia Sampson  MRN: [id number]   :  1980 File #: [id number]   Age: 40 y o  Encounter #: [id number]   Study Start: 2017  Study End: 2017           Report date: 2017          Study type: Continuous video EEG    ICD 10 diagnosis: G40 311    -------------------------------------------------  Patient History: This recording was observed in a 40 y o  female  with [place] Syndrome with intractable seizures to determine frequency of seizures and guide therapy  Medications include:     artificial tear 1 application Ophthalmic TID   cloBAZam 15 mg Per G Tube HS   cloBAZam 5 mg Oral Daily   enoxaparin 40 mg Subcutaneous Daily   lactulose 35 g Per G Tube TID   levETIRAcetam 1,000 mg Per G Tube Q12H [address]   levOCARNitine 1,000 mg Per G Tube BID With Meals   melatonin 6 mg Per G Tube HS   multivitamin with iron-minerals 15 mL Per G Tube Daily   Perampanel 8 mg Per G Tube Daily   potassium chloride 40 mEq Per G Tube Daily   rufinamide 1,200 mg Per G Tube BID   saccharomyces boulardii 250 mg Per G Tube BID   senna-docusate sodium 1 tablet Per G Tube Daily   topiramate 250 mg Per G Tube BID   Valproic Acid 500 mg Per G Tube Q8H       -------------------------------------------------  Description of Procedure:  32 channel digital recording with electrodes placed according to the International 10-20 system with additional T1/T2 electrodes,  EOG, EKG, and simultaneous video  A monitoring technologist supervised the continuous recording    The recording was technically satisfactory  -------------------------------------------------  Results:   Manual Review:   No age or state appropriate activities were seen  Instead, background activities consisted of reactive, diffuse, poorly organized, low to medium amplitude theta/delta activity with intermixed lower  amplitude alpha/beta activities  At times there anteriorly predominant low to medium amplitude activities near 4 cps  At times there was diffuse relative suppression with very low amplitude beta activities  During sleep the background often contained  diffuse low to medium amplitude, mixed frequency 2-4 cps activities  Features of stage 2 sleep were not present  There were significant periods of wakefulness  At times with stimulation and relative arousal there was relative attenuation of delta activities  There were frequent generalized spike and polyspike wave discharges that at times occurred in brief bursts near 2 cps  There were frequent independent left and right temporal spikes  There were tonic seizures, at times with brief clonic component, as  described below  Other findings:  Samples of the single channel ECG demonstrated a regular rhythm  Events/Seizures: There were 29 seizures detected by manual review (supplemented by automatic detection) during this study  Seizures last from about 10 seconds to 25 seconds in duration  Most seizures involve eye opening, many  seizures involved tonic stiffening of the arms (often L > R) and at times during longer seizures there were brief mild clonic jerks of the arms  Most seizures appeared to start out of sleep, but some seizures occurred during wakefulness  Electrographically  the seizures consisted of diffuse attenuation with emergence of low amplitude rhythmic beta activity and/or medium to high amplitude rhythmic spike wave discharges near 2 cps  All seizures were reviewed on video had clinical correlate       Hour Seizures per hour   8 0   9 0 10 7   11 7   12 1   13 6   14 1   15 1   16 2   17 4   Grand Total 29     The study was largely obscured by artifact after the patient removes multiple electrodes near 18:00     -------------------------------------------------  Interpretation: This prolonged, continuous video-EEG recording is abnormal      29 electrographic seizures, lasting about 10 seconds to 25 seconds in duration are captured during 10 hours of this study  All seizures reviewed on video had clinical correlate that was often subtle  Most seizures involve eye opening, many seizures involve  relatively mild tonic stiffening of the arms (often L > R) and at times during longer seizures there are brief mild clonic jerks of the arms  Most seizures occur out of apparent sleep, but some seizures occur during wakefulness  Electrographic characteristics  are consistent with seizures captured during prior monitoring periods  A background of diffuse, poorly organized theta and intermittent delta activities suggests moderate nonspecific diffuse cerebral dysfunction  Frequent generalized epileptiform discharges as well as independent left and right temporal epileptiform discharges  indicate underlying epileptogenic potential and are consistent with the patient's diagnosis of Lennox Gastaut syndrome  The patient was awake and active during much of this study  Shraddha Pollack MD   [address] [facility] [place]    Dec  7 2017  7:40AM [place] Standard Time

## 2018-01-23 NOTE — PROCEDURES
Procedures by Wilner Cadena MD at 2017  12:48 PM      Author:  Wilner Cadena MD Service:  Neurology Author Type:  Physician    Filed:  2017  2:36 PM Date of Service:  2017 12:48 PM Status:  Signed    :  Wilner Cadena MD (Physician)        Procedure Orders:       1  EEG Video Monitoring 24 Hour [[id number]] ordered by Wilner Cadena MD at 17 1100                  Continuous Video EEG Monitoring       Patient Name:  Patricia Mcgill  MRN: [id number]   :  1980 File #: [id number]   Age: 40 y o  Encounter #: [id number]   Study Start: 2017 08:00  Study End: 2017 08:00           Report date: 2017          Study type: Continuous video EEG    ICD 10 diagnosis: G40 311    -------------------------------------------------  Patient History: This recording was observed in a 40 y o  female with [place] Syndrome  with intractable seizures to determine frequency of seizures and guide therapy  Medications include:     artificial tear 1 application Ophthalmic TID   cloBAZam 15 mg Per G Tube HS   cloBAZam 5 mg Oral Daily   enoxaparin 40 mg Subcutaneous Daily   lactulose 35 g Per G Tube TID   levETIRAcetam 1,000 mg Per G Tube Q12H [address]   levOCARNitine 500 mg Per G Tube TID   melatonin 6 mg Per G Tube HS   multivitamin with iron-minerals 15 mL Per G Tube Daily   Perampanel 8 mg Per G Tube Daily   potassium chloride 40 mEq Per G Tube Daily   rufinamide 1,200 mg Per G Tube BID   saccharomyces boulardii 250 mg Per G Tube BID   senna-docusate sodium 1 tablet Per G Tube Daily   topiramate 250 mg Per G Tube BID   Valproic Acid 500 mg Per G Tube Q8H       -------------------------------------------------  Description of Procedure:  32 channel digital recording with electrodes placed according to the International 10-20 system with additional T1/T2 electrodes,  EOG, EKG, and simultaneous video  A monitoring technologist supervised the continuous recording    The recording was technically satisfactory  -------------------------------------------------  Results:   Manual Review:   No age or state appropriate activities were seen  Instead, background activities consisted of reactive, diffuse, poorly organized, low to medium amplitude theta/delta activity with intermixed lower  amplitude alpha/beta activities  At times there anteriorly predominant low to medium amplitude activities near 4 cps  At times there was diffuse relative suppression with very low amplitude beta activities  During sleep the background often contained  diffuse low to medium amplitude, mixed frequency 2-4 cps activities  Features of stage 2 sleep were not present  Much of the recording occurs during apparent sleep, but there are significant periods of wakefulness  At times with stimulation and relative  arousal there was relative attenuation of delta activities  There were frequent generalized spike and polyspike wave discharges that at times occurred in brief bursts near 2 cps  There were frequent independent left and right temporal spikes  There were tonic seizures, at times with brief clonic component, as  described below  Other findings:  Samples of the single channel ECG demonstrated a regular rhythm  Events/Seizures: There were 77 seizures detected by manual review (supplemented by automatic detection) during this study  Seizures last from about 10 seconds to 27 seconds in duration  Most seizures involve eye opening, many  seizures involved tonic stiffening of the arms (often L > R) and at times during longer seizures there were brief mild clonic jerks of the arms  Most seizures appeared to start out of sleep, but some seizures occurred during wakefulness  Electrographically  the seizures consisted of diffuse attenuation with emergence of low amplitude rhythmic beta activity and/or medium to high amplitude rhythmic spike wave discharges near 2 cps   37 seizures were reviewed on video and 29 of these seizures had clinical correlate  Hour Seizures per hour   8 0   9 6   10 9   11 7   12 5   13 8   14 2   15 1   16 1   17 3   18 0   19 0   20 0   21 2   23 1   0 0   1 1   2 8   3 4   4 7   5 6   6 6   7 0   Grand Total 77       -------------------------------------------------  Interpretation: This prolonged, continuous video-EEG recording is abnormal      77 electrographic seizures, lasting about 10 seconds to 27 seconds in duration are captured during this 24 hour study  78% of seizures reviewed on video had clinical correlate  Most seizures involve eye opening, many seizures involve relatively mild  tonic stiffening of the arms (often L > R) and at times during longer seizures there are brief mild clonic jerks of the arms  Most seizures occur out of apparent sleep, but some seizures occur during wakefulness  Electrographic characteristics are consistent  with seizures captured during prior monitoring periods  A background of diffuse, poorly organized theta and intermittent delta activities suggests moderate nonspecific diffuse cerebral dysfunction  Frequent generalized epileptiform discharges as well as independent left and right temporal epileptiform discharges  indicate underlying epileptogenic potential and are consistent with the patient's diagnosis of Lennox Gastaut syndrome  Armando Hummel MD   [address] [facility] [place]    Dec  5 2017  2:36PM [facility] Standard Time

## 2018-01-23 NOTE — PROCEDURES
Procedures by Federico Wilson MD at 12/3/2017  12:15 PM      Author:  Federico Wilson MD Service:  Neurology Author Type:  Physician    Filed:  12/3/2017 12:42 PM Date of Service:  12/3/2017 12:15 PM Status:  Signed    :  Federico Wilson MD (Physician)        Procedure Orders:       1  EEG Video Monitoring 24 Hour [id number] ordered by Federico Wilson MD at 17 1010                  Continuous Video EEG Monitoring       Patient Name:  Bassam Storm  MRN: [id number]   :  1980 File #: [id number]   Age: 40 y o  Encounter #: [id number]          Report date: 12/3/2017          Study type: Continuous video EEG    ICD 10 diagnosis: Generalized epilepsy intractable with status G40 311    Start time: 2017: 08:00 End time: 12/3/2017: 07:59     -------------------------------------------------------------------------------------------------------------------   Patient History: This recording was observed in a 40 y o  female with Shaun Nuha Syndrome with intractable seizures to determine  frequency of seizures and guide therapy       Medications include:     artificial tear 1 application Ophthalmic TID   cloBAZam 15 mg Per G Tube HS   cloBAZam 5 mg Oral Daily   enoxaparin 40 mg Subcutaneous Daily   lactulose 35 g Per G Tube TID   levETIRAcetam 1,000 mg Per G Tube Q12H [address]   levOCARNitine 500 mg Per G Tube TID   melatonin 6 mg Per G Tube HS   multivitamin with iron-minerals 15 mL Per G Tube Daily   Perampanel 8 mg Per G Tube Daily   piperacillin-tazobactam 3 375 g Intravenous Q6H   potassium chloride 40 mEq Per G Tube Daily   rufinamide 1,600 mg Per G Tube BID   saccharomyces boulardii 250 mg Per G Tube BID   senna-docusate sodium 1 tablet Per G Tube Daily   topiramate 250 mg Per G Tube BID   Valproic Acid 500 mg Per G Tube Q8H   vancomycin 15 mg/kg Intravenous Q8H       -------------------------------------------------------------------------------------------------------------------   Description of Procedure:  ·  32 channel digital recording with electrodes placed according to the International 10-20 system with additional  T1/T2 electrodes, EOG, EKG, and simultaneous  video  A monitoring technologist supervised the continuous recording  The recording was technically satisfactory  -------------------------------------------------------------------------------------------------------------------   Results:   Manual Review:   Manual review of the tracing was essentially unchanged from the prior day's recording  No age or state appropriate activities were seen  Instead, background activities  consisted of reactive, generalized, poorly organized, predominantly theta and delta activities with mixed overriding  faster activities  There were frequent  independent right and left temporal spike wave discharges as well as generalized spike wave discharges  These generalized spike wave discharges  occasionally occurred in runs of generalized slow spike and wave discharges  There were very frequent generalized tonic and generalized tonic clonic seizures seen over the course of the tracing, as detailed below  Patient was predominantly asleep throughout the study, but following stimulation and during periods of relative wakefulness on EEG, there were long runs of centrally dominant, low amplitude 5-6 cps theta activities  Other findings: The single lead ECG demonstrated a regular rhythm  Events:   No push buttons were activated  Although no push buttons were activated, a total of 136 seizures were captured over the course of the tracing, as below  Seizures again clinically manifested with initial eye opening, then followed by tonic stiffening of arms, and less commonly would  progress further to include clonic jerking movements of the limbs  Overall, these seizures were shorter in duration than those seen on the prior day's recording and tonic-clonic seizures were much less commonly seen   See below for hourly breakdown of  seizures  These seizures again occurred almost exclusively out of sleep and typically lasted around 30-40 seconds  All of these seizures were electrographically  similar, starting with a burst of generalized slow spike wave discharges at 2-2 5 cps that was followed by generalized attenuation of activity and low amplitude rhythmic, generalized beta activity that gradually increased in frequency and amplitude to  be rhythmic alpha activity before abruptly attenuating      08:00-09:00: 11 seizures total  (2 tonic-clonic, 5 tonic, and 4 with subtle or no clinical change)  09:00-10:00: 11 seizures total  (4 tonic, and 7 with subtle or no clinical change)  10:00-11:00: 6 seizures total  (5 tonic, and 1 with subtle or no clinical change)  11:00-12:00: 4 seizures total  (4 tonic)  12:00-13:00: 5 seizures total  (5 tonic)  13:00-14:00: 8 seizures total  (7 tonic, and 1 with subtle or no clinical change)  14:00-15:00: 4 seizures total  ( 4 tonic)  15:00-16:00: 7 seizures total  (7 tonic)  16:00-17:00: 3 seizures total  (3 tonic)  17:00- 18:00: 4 seizures total  (4 tonic)  18:00-19:00: 3 seizures total  (2 tonic, and 1 with subtle or no clinical change)  19:00-20:00: 5 seizures total  (2 tonic, and 3 with subtle or no clinical change)  20:00-21:00: 2 seizures total  (1 tonic, and 1 with subtle or no clinical change)  21:00-22:00: 2 seizures total  (1 tonic, and 1 with subtle or no clinical change)  22:00-23:00: 0 seizures total    23:00 - 00:00: 6 seizures total  ( 6 tonic)  00:00 - 01:00: 2 seizures total  (2 tonic)  01:00 - 02:00: 8 seizures total  (8 tonic)  02:00-03:00: 10 seizures total  (10 tonic)  03:00- 04:00: 8 seizures total  (8 tonic)  04:00-05:00: 7 seizures total  (6 tonic, and 1 with subtle or no clinical change)   05:00-06:00: 9 seizures total  (8 tonic, and 1 with subtle or no clinical change)  06:00-07:00: 5 seizures total  (2 tonic, and 3 with subtle or no clinical change)  07:00-08:00: 6 seizures total  (6 with subtle or no clinical change)    -------------------------------------------------------------------------------------------------------------------   Interpretation: This prolonged, continuous video-EEG recording captured a total of 136 generalized tonic seizures, seizures with only subtle clinical changes, or no clinical change  Although seizure frequency  is not dramatically different from the prior day's recording, there was an overall trend to less prominent clinical changes compared to the prior day's study  Background activities  are disorganized and too slow suggesting moderate to severe diffuse bilateral cerebral dysfunction  Presence of background slowing, generalized spike wave discharges, multifocal spike wave discharges and runs of slow spike wave discharges are consistent  with patient's known diagnosis of Lennox Gastaut syndrome  Further monitoring is suggested to help evaluate progress in treatment  Bao Perez MD   [address] [facility]                   Received for:Rory DORSEY    Dec  3 2017 12:42PM [facility] Standard Time

## 2018-03-04 ENCOUNTER — HOSPITAL ENCOUNTER (EMERGENCY)
Facility: HOSPITAL | Age: 38
Discharge: HOME | End: 2018-03-04
Attending: EMERGENCY MEDICINE | Admitting: EMERGENCY MEDICINE
Payer: MEDICARE

## 2018-03-04 VITALS
WEIGHT: 102.6 LBS | HEART RATE: 82 BPM | BODY MASS INDEX: 18.77 KG/M2 | SYSTOLIC BLOOD PRESSURE: 109 MMHG | DIASTOLIC BLOOD PRESSURE: 69 MMHG | TEMPERATURE: 98.8 F | RESPIRATION RATE: 18 BRPM | OXYGEN SATURATION: 98 %

## 2018-03-04 DIAGNOSIS — G40.919 BREAKTHROUGH SEIZURE (HCC): Primary | ICD-10-CM

## 2018-03-04 LAB
ALBUMIN SERPL BCP-MCNC: 3.3 G/DL (ref 3.5–5)
ALP SERPL-CCNC: 128 U/L (ref 46–116)
ALT SERPL W P-5'-P-CCNC: 58 U/L (ref 12–78)
AMMONIA PLAS-SCNC: 29 UMOL/L (ref 11–35)
ANION GAP SERPL CALCULATED.3IONS-SCNC: 12 MMOL/L (ref 4–13)
AST SERPL W P-5'-P-CCNC: 44 U/L (ref 5–45)
BASOPHILS # BLD AUTO: 0.04 THOUSANDS/ΜL (ref 0–0.1)
BASOPHILS NFR BLD AUTO: 1 % (ref 0–1)
BILIRUB SERPL-MCNC: 0.4 MG/DL (ref 0.2–1)
BILIRUB UR QL STRIP: NEGATIVE
BUN SERPL-MCNC: 13 MG/DL (ref 5–25)
CALCIUM SERPL-MCNC: 8.4 MG/DL (ref 8.3–10.1)
CHLORIDE SERPL-SCNC: 108 MMOL/L (ref 100–108)
CLARITY UR: CLEAR
CO2 SERPL-SCNC: 22 MMOL/L (ref 21–32)
COLOR UR: YELLOW
CREAT SERPL-MCNC: 0.6 MG/DL (ref 0.6–1.3)
EOSINOPHIL # BLD AUTO: 0.07 THOUSAND/ΜL (ref 0–0.61)
EOSINOPHIL NFR BLD AUTO: 1 % (ref 0–6)
ERYTHROCYTE [DISTWIDTH] IN BLOOD BY AUTOMATED COUNT: 14.1 % (ref 11.6–15.1)
GFR SERPL CREATININE-BSD FRML MDRD: 118 ML/MIN/1.73SQ M
GLUCOSE SERPL-MCNC: 100 MG/DL (ref 65–140)
GLUCOSE SERPL-MCNC: 104 MG/DL (ref 65–140)
GLUCOSE UR STRIP-MCNC: NEGATIVE MG/DL
HCT VFR BLD AUTO: 40.7 % (ref 34.8–46.1)
HGB BLD-MCNC: 13.5 G/DL (ref 11.5–15.4)
HGB UR QL STRIP.AUTO: NEGATIVE
KETONES UR STRIP-MCNC: NEGATIVE MG/DL
LEUKOCYTE ESTERASE UR QL STRIP: NEGATIVE
LYMPHOCYTES # BLD AUTO: 1.87 THOUSANDS/ΜL (ref 0.6–4.47)
LYMPHOCYTES NFR BLD AUTO: 23 % (ref 14–44)
MCH RBC QN AUTO: 32.8 PG (ref 26.8–34.3)
MCHC RBC AUTO-ENTMCNC: 33.2 G/DL (ref 31.4–37.4)
MCV RBC AUTO: 99 FL (ref 82–98)
MONOCYTES # BLD AUTO: 0.82 THOUSAND/ΜL (ref 0.17–1.22)
MONOCYTES NFR BLD AUTO: 10 % (ref 4–12)
NEUTROPHILS # BLD AUTO: 5.36 THOUSANDS/ΜL (ref 1.85–7.62)
NEUTS SEG NFR BLD AUTO: 65 % (ref 43–75)
NITRITE UR QL STRIP: NEGATIVE
PH UR STRIP.AUTO: 6 [PH] (ref 4.5–8)
PLATELET # BLD AUTO: 170 THOUSANDS/UL (ref 149–390)
PMV BLD AUTO: 12.4 FL (ref 8.9–12.7)
POTASSIUM SERPL-SCNC: 4.1 MMOL/L (ref 3.5–5.3)
PROT SERPL-MCNC: 7.7 G/DL (ref 6.4–8.2)
PROT UR STRIP-MCNC: NEGATIVE MG/DL
RBC # BLD AUTO: 4.12 MILLION/UL (ref 3.81–5.12)
SODIUM SERPL-SCNC: 142 MMOL/L (ref 136–145)
SP GR UR STRIP.AUTO: 1.02 (ref 1–1.03)
UROBILINOGEN UR QL STRIP.AUTO: 0.2 E.U./DL
WBC # BLD AUTO: 8.16 THOUSAND/UL (ref 4.31–10.16)

## 2018-03-04 PROCEDURE — 80053 COMPREHEN METABOLIC PANEL: CPT | Performed by: EMERGENCY MEDICINE

## 2018-03-04 PROCEDURE — 96365 THER/PROPH/DIAG IV INF INIT: CPT

## 2018-03-04 PROCEDURE — 36415 COLL VENOUS BLD VENIPUNCTURE: CPT | Performed by: EMERGENCY MEDICINE

## 2018-03-04 PROCEDURE — 99285 EMERGENCY DEPT VISIT HI MDM: CPT

## 2018-03-04 PROCEDURE — 82948 REAGENT STRIP/BLOOD GLUCOSE: CPT

## 2018-03-04 PROCEDURE — 81003 URINALYSIS AUTO W/O SCOPE: CPT | Performed by: EMERGENCY MEDICINE

## 2018-03-04 PROCEDURE — 80165 DIPROPYLACETIC ACID FREE: CPT | Performed by: EMERGENCY MEDICINE

## 2018-03-04 PROCEDURE — 85025 COMPLETE CBC W/AUTO DIFF WBC: CPT | Performed by: EMERGENCY MEDICINE

## 2018-03-04 PROCEDURE — 80177 DRUG SCRN QUAN LEVETIRACETAM: CPT | Performed by: EMERGENCY MEDICINE

## 2018-03-04 PROCEDURE — 82140 ASSAY OF AMMONIA: CPT | Performed by: EMERGENCY MEDICINE

## 2018-03-04 RX ORDER — CLOBAZAM 10 MG/1
10 TABLET ORAL
COMMUNITY
End: 2018-04-23

## 2018-03-04 RX ORDER — ACETAMINOPHEN 325 MG/1
650 TABLET ORAL EVERY 6 HOURS PRN
COMMUNITY
End: 2018-10-13 | Stop reason: HOSPADM

## 2018-03-04 RX ADMIN — LEVETIRACETAM 1000 MG: 100 INJECTION, SOLUTION INTRAVENOUS at 14:11

## 2018-03-04 NOTE — ED NOTES
Pt incontinent of stool  Pt cleaned and brief changed  Repositioned at this time       Olivia Akhtar RN  03/04/18 3883

## 2018-03-04 NOTE — ED NOTES
Report called to Isi Sifuentes at [facility] to be d/cd back to [facility] to transfer at approx  1700  Bobbette Eisenmenger, RN  03/04/18 2471

## 2018-03-04 NOTE — ED PROVIDER NOTES
History  Chief Complaint   Patient presents with    Seizure - Prior Hx Of     [address] [facility] 4 seizures within 30 minute period     Patient sent from [address] for episodes of breakthrough seizures today  She has a complex h/o seizures due to Lennox-Gastault syndrome and recurrent episodes of status epilepticus  She is on 6 anti epileptic medications but continues to have breakthrough seizures and positive EEG studies  EMS reports giving 1 mg of Ativan without observing any further seizure activity  Nursing home staff reported 3 or 4 episodes lasting 10-15 seconds each with spontaneous resolution each time  She also has developmental delay and is nonverbal for history  She is known to be intermittently combative even unassociated with postictal periods  No recent travel or sick contacts  No report of preceding f/c, HA, CP, SOB, abdominal pain, n/v/d                  History provided by:  Medical records, EMS personnel and nursing home  History limited by:  Patient nonverbal  Seizure - Prior Hx Of   Seizure activity on arrival: no    Seizure type:  Unable to specify  Preceding symptoms comment:  Unable to specify  Initial focality:  Unable to specify  Episode characteristics: abnormal movements and unresponsiveness    Episode characteristics: no tongue biting    Postictal symptoms: confusion    Postictal symptoms: no somnolence    Return to baseline: yes    Severity:  Mild  Duration:  15 seconds  Timing:  Clustered  Number of seizures this episode:  3  Progression:  Resolved  Context: developmental delay and previous head injury    Context: not alcohol withdrawal, not cerebral palsy, not change in medication, not sleeping less, not drug use, not emotional upset, not fever, medical compliance, not possible hypoglycemia, not possible medication ingestion, not pregnant and not stress    Context comment:  Per nursing home staff  Recent head injury:  No recent head injuries  PTA treatment:  Lorazepam  History of seizures: yes    Similar to previous episodes: yes    Severity:  Severe  Seizure control level: Well controlled  Current therapy:  Valproic acid, levetiracetam and topiramate (And others-see med rec)  Compliance with current therapy:  Good      Prior to Admission Medications   Prescriptions Last Dose Informant Patient Reported? Taking?    MELATONIN PO   Yes Yes   Si mg by Per G Tube route daily at bedtime   Multiple Vitamins-Minerals (CENTRUM SILVER PO)   Yes Yes   Si tablet by Per G Tube route daily   Probiotic Product (ALEXANDER-BID PROBIOTIC PO)   Yes Yes   Si tablet by Per G Tube route daily     Sennosides-Docusate Sodium (SENEXON-S PO)   Yes Yes   Si tablet by Per G Tube route daily     Sodium Phosphates (FLEET ENEMA RE)   Yes Yes   Sig: Insert into the rectum as needed   TOPIRAMATE PO   Yes Yes   Si mg by Per G Tube route 2 (two) times a day   Valproic Acid (DEPAKENE) 250 MG/5ML soln   No Yes   Sig: 10 mL by Per G Tube route every 8 (eight) hours   White Petrolatum-Mineral Oil (SYSTANE NIGHTTIME) OINT   Yes Yes   Sig: Apply 1 application to eye 3 (three) times a day Both eyes    acetaminophen (TYLENOL) 325 mg tablet   Yes Yes   Si mg by Per G Tube route every 6 (six) hours as needed for mild pain   cloBAZam (ONFI) tablet   Yes Yes   Sig: 10 mg by Per G Tube route daily at bedtime   lactulose 20 g/30 mL   Yes Yes   Si g by Per G Tube route 3 (three) times a day   levETIRAcetam (KEPPRA) 100 mg/mL oral solution   No Yes   Sig: 10 mL by Per G Tube route every 12 (twelve) hours for 30 days   levOCARNitine (CARNITOR) 1 g/10 mL solution   No Yes   Sig: 10 mL by Per G Tube route 2 (two) times a day with meals   Patient taking differently: 500 mg by Per G Tube route 3 (three) times a day     magnesium hydroxide (MILK OF MAGNESIA) 400 mg/5 mL oral suspension   Yes Yes   Sig: Take 30 mL by mouth daily as needed for constipation   perampanel (FYCOMPA) oral suspension   Yes Yes   Sig: 10 mg daily at bedtime Per G tube   potassium chloride 10 %   Yes Yes   Si mEq by Per G Tube route daily   rufinamide (BANZEL) 400 mg tablet   No Yes   Sig: 3 tablets by Per G Tube route 2 (two) times a day      Facility-Administered Medications: None       Past Medical History:   Diagnosis Date    ADHD     Anoxic brain damage (HCC)     Autistic disorder     Epilepsy ([facility] [address] )     Hyperammonemia (HCC)     Hyperkeratosis     Hypotension     Hypotension     Intellectual disability     Intellectual disability     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with tonic seizures (HCC)     Lethargy     Liver enzyme elevation     Onychomycosis     Osteoporosis     Osteoporosis     Psychiatric disorder     anxiety    Seizures (HCC)        Past Surgical History:   Procedure Laterality Date    CARDIAC PACEMAKER PLACEMENT      JEJUNOSTOMY FEEDING TUBE      PEG TUBE PLACEMENT         History reviewed  No pertinent family history  I have reviewed and agree with the history as documented  Social History   Substance Use Topics    Smoking status: Never Smoker    Smokeless tobacco: Never Used    Alcohol use No        Review of Systems   Unable to perform ROS: Patient nonverbal       Physical Exam  ED Triage Vitals [18 1234]   Temperature Pulse Respirations Blood Pressure SpO2   98 8 °F (37 1 °C) 87 18 96/68 94 %      Temp Source Heart Rate Source Patient Position - Orthostatic VS BP Location FiO2 (%)   Temporal Monitor Lying Right arm --      Pain Score       No Pain           Orthostatic Vital Signs  Vitals:    18 1330 18 1400 18 1430 18 1600   BP: 99/74 101/74 111/78 109/69   Pulse: 76 82 90 82   Patient Position - Orthostatic VS: Lying Lying Lying        Physical Exam   Constitutional: She appears well-developed and well-nourished  No distress  HENT:   Head: Atraumatic     Mouth/Throat: Oropharynx is clear and moist    No tongue injury   Eyes: EOM are normal  Pupils are equal, round, and reactive to light  Neck: Normal range of motion  Neck supple  Cardiovascular: Normal rate and regular rhythm  No murmur heard  Pulmonary/Chest: Effort normal and breath sounds normal    Abdominal: Soft  Bowel sounds are normal  She exhibits no distension  There is no tenderness  Musculoskeletal: She exhibits no edema or tenderness  Lymphadenopathy:     She has no cervical adenopathy  Neurological: She is alert  She has normal reflexes  No cranial nerve deficit (As best can be evaluated due to patient cooperation) or sensory deficit  She exhibits normal muscle tone  Skin: Skin is warm and dry  Capillary refill takes less than 2 seconds  No rash noted  She is not diaphoretic  No erythema  No pallor  Psychiatric:   Developmentally delayed with decreased interaction   Vitals reviewed  ED Medications  Medications    EMS REPLENISHMENT MED (not administered)   levETIRAcetam (KEPPRA) 1,000 mg in sodium chloride 0 9 % 100 mL IVPB (0 mg Intravenous Stopped 3/4/18 1428)       Diagnostic Studies  Results Reviewed     Procedure Component Value Units Date/Time    Comprehensive metabolic panel [[id number]]  (Abnormal) Collected:  03/04/18 1252    Lab Status:  Final result Specimen:  Blood from Hand, Left Updated:  03/04/18 1324     Sodium 142 mmol/L      Potassium 4 1 mmol/L      Chloride 108 mmol/L      CO2 22 mmol/L      Anion Gap 12 mmol/L      BUN 13 mg/dL      Creatinine 0 60 mg/dL      Glucose 100 mg/dL      Calcium 8 4 mg/dL      AST 44 U/L      ALT 58 U/L      Alkaline Phosphatase 128 (H) U/L      Total Protein 7 7 g/dL      Albumin 3 3 (L) g/dL      Total Bilirubin 0 40 mg/dL      eGFR 118 ml/min/1 73sq m     Narrative:         National Kidney Disease Education Program recommendations are as follows:  GFR calculation is accurate only with a steady state creatinine  Chronic Kidney disease less than 60 ml/min/1 73 sq  meters  Kidney failure less than 15 ml/min/1 73 sq  meters  Ammonia [[id number]]  (Normal) Collected:  03/04/18 1252    Lab Status:  Final result Specimen:  Blood from Hand, Left Updated:  03/04/18 1311     Ammonia 29 umol/L     UA w Reflex to Microscopic w Reflex to Culture [[id number]]  (Normal) Collected:  03/04/18 1304    Lab Status:  Final result Specimen:  Urine from Urine, Straight Cath Updated:  03/04/18 1311     Color, UA Yellow     Clarity, UA Clear     Specific [place], UA 1 020     pH, UA 6 0     Leukocytes, UA Negative     Nitrite, UA Negative     Protein, UA Negative mg/dl      Glucose, UA Negative mg/dl      Ketones, UA Negative mg/dl      Urobilinogen, UA 0 2 E U /dl      Bilirubin, UA Negative     Blood, UA Negative    CBC and differential [[id number]]  (Abnormal) Collected:  03/04/18 1252    Lab Status:  Final result Specimen:  Blood from Hand, Left Updated:  03/04/18 1302     WBC 8 16 Thousand/uL      RBC 4 12 Million/uL      Hemoglobin 13 5 g/dL      Hematocrit 40 7 %      MCV 99 (H) fL      MCH 32 8 pg      MCHC 33 2 g/dL      RDW 14 1 %      MPV 12 4 fL      Platelets 616 Thousands/uL      Neutrophils Relative 65 %      Lymphocytes Relative 23 %      Monocytes Relative 10 %      Eosinophils Relative 1 %      Basophils Relative 1 %      Neutrophils Absolute 5 36 Thousands/µL      Lymphocytes Absolute 1 87 Thousands/µL      Monocytes Absolute 0 82 Thousand/µL      Eosinophils Absolute 0 07 Thousand/µL      Basophils Absolute 0 04 Thousands/µL     Fingerstick Glucose (POCT) [[id number]]  (Normal) Collected:  03/04/18 1255    Lab Status:  Final result Updated:  03/04/18 1258     POC Glucose 104 mg/dl     Valproic acid level, free [[id number]] Collected:  03/04/18 1252    Lab Status: In process Specimen:  Blood from Hand, Left Updated:  03/04/18 1256    Levetiracetam level [[id number]] Collected:  03/04/18 1252    Lab Status:   In process Specimen:  Blood from Hand, Left Updated:  03/04/18 1256                 No orders to display Procedures  Procedures       Phone Contacts  ED Phone Contact    ED Course  ED Course as of Mar 04 1740   Alissa Rosario Mar 04, 2018   1447 No exacerbating causes of breakthrough seizure  Likely a factor of her seizure disorder  Keppra bolus given for protection  Will return to nursing home to follow up with Neurology as scheduled  MDM  Number of Diagnoses or Management Options  Breakthrough seizure [facility]):   Diagnosis management comments: DDx: Breakthrough seizure - abnormal electrolytes, medication error, doubt drug abuse, EtOH w/d, sleep deprivation, trauma,   A/P: Will check metabolic w/u, Keppra and valproic acid levels, treat symptoms with Keppra loading, reevaluate for disposition  Amount and/or Complexity of Data Reviewed  Clinical lab tests: reviewed and ordered  Obtain history from someone other than the patient: yes (Nursing home staff, EMS)  Review and summarize past medical records: yes      CritCare Time    Disposition  Final diagnoses:   Breakthrough seizure ([address] )     Time reflects when diagnosis was documented in both MDM as applicable and the Disposition within this note     Time User Action Codes Description Comment    3/4/2018  2:48 PM Luis Burns [G40 919] Breakthrough seizure [facility])       ED Disposition     ED Disposition Condition Comment    Discharge  [facility]OTT[facility] discharge to home/self care      Condition at discharge: Stable        Follow-up Information     Follow up With Specialties Details Why [address], DO Family Medicine Schedule an appointment as soon as possible for a visit If symptoms worsen [facility] [address]  [phone]          Discharge Medication List as of 3/4/2018  2:49 PM      CONTINUE these medications which have NOT CHANGED    Details   acetaminophen (TYLENOL) 325 mg tablet 650 mg by Per G Tube route every 6 (six) hours as needed for mild pain, Historical Med      cloBAZam (ONFI) tablet 10 mg by Per G Tube route daily at bedtime, Historical Med      lactulose 20 g/30 mL 35 g by Per G Tube route 3 (three) times a day, Historical Med      levETIRAcetam (KEPPRA) 100 mg/mL oral solution 10 mL by Per G Tube route every 12 (twelve) hours for 30 days, Starting Thu 10/26/2017, Until Sun 3/4/2018, Print      levOCARNitine (CARNITOR) 1 g/10 mL solution 10 mL by Per G Tube route 2 (two) times a day with meals, Starting Thu 12/7/2017, No Print      magnesium hydroxide (MILK OF MAGNESIA) 400 mg/5 mL oral suspension Take 30 mL by mouth daily as needed for constipation, Historical Med      MELATONIN PO 6 mg by Per G Tube route daily at bedtime, Historical Med      Multiple Vitamins-Minerals (CENTRUM SILVER PO) 1 tablet by Per G Tube route daily, Until Discontinued, Historical Med      perampanel (FYCOMPA) oral suspension 10 mg daily at bedtime Per G tube, Historical Med      potassium chloride 10 % 40 mEq by Per G Tube route daily, Historical Med      Probiotic Product (ALEXANDER-BID PROBIOTIC PO) 1 tablet by Per G Tube route daily  , Historical Med      rufinamide (BANZEL) 400 mg tablet 3 tablets by Per G Tube route 2 (two) times a day, Starting Thu 12/7/2017, No Print      Sennosides-Docusate Sodium (SENEXON-S PO) 1 tablet by Per G Tube route daily  , Until Discontinued, Historical Med      Sodium Phosphates (FLEET ENEMA RE) Insert into the rectum as needed, Historical Med      TOPIRAMATE  mg by Per G Tube route 2 (two) times a day, Historical Med      Valproic Acid (DEPAKENE) 250 MG/5ML soln 10 mL by Per G Tube route every 8 (eight) hours, Starting Thu 12/7/2017, No Print      White Petrolatum-Mineral Oil (SYSTANE NIGHTTIME) OINT Apply 1 application to eye 3 (three) times a day Both eyes , Until Discontinued, Historical Med           No discharge procedures on file      ED Provider  Electronically Signed by           Minoo Bryan DO  03/04/18 9101

## 2018-03-06 LAB — LEVETIRACETAM SERPL-MCNC: 31.9 UG/ML (ref 10–40)

## 2018-03-07 LAB — VALPROATE FREE SERPL-MCNC: 5.5 UG/ML (ref 6–22)

## 2018-04-10 ENCOUNTER — HOSPITAL ENCOUNTER (EMERGENCY)
Facility: HOSPITAL | Age: 38
Discharge: HOME | End: 2018-04-10
Attending: EMERGENCY MEDICINE
Payer: MEDICARE

## 2018-04-10 VITALS
RESPIRATION RATE: 18 BRPM | BODY MASS INDEX: 18.29 KG/M2 | SYSTOLIC BLOOD PRESSURE: 95 MMHG | OXYGEN SATURATION: 99 % | TEMPERATURE: 97.4 F | HEART RATE: 82 BPM | WEIGHT: 100 LBS | DIASTOLIC BLOOD PRESSURE: 61 MMHG

## 2018-04-10 DIAGNOSIS — K94.20 COMPLICATION OF GASTROSTOMY TUBE (HCC): Primary | ICD-10-CM

## 2018-04-10 PROCEDURE — 99283 EMERGENCY DEPT VISIT LOW MDM: CPT

## 2018-04-10 NOTE — ED PROVIDER NOTES
History  Chief Complaint   Patient presents with    Feeding Tube Problem     Patient pulled out her feeding tube at 10 am this morning  Patient presents via EMS from her group home after mechanically removing her PEG tube at 1000 hours  She is essentially nonverbal and is a poor historian but does not appear to be in any distress including pain  Group home did send her extracted PEG tube  No recent travel or sick contacts  No associated fever, LH/dizziness, CP, SOB, n/v/d  Denies other injury or complaint  History provided by:  Medical records, EMS personnel and caregiver  Feeding Tube Problem   Location:  PEG  Severity:  Unable to specify  Onset quality:  Sudden  Duration:  1 hour  Timing:  Constant  Progression:  Unchanged  Chronicity:  Recurrent  Relieved by:  Nothing tried  Worsened by:  Nothing  Ineffective treatments:  None tried  Associated symptoms: no abdominal pain, no chest pain, no congestion, no cough, no diarrhea, no fever, no headaches, no loss of consciousness, no nausea, no rash, no rhinorrhea, no shortness of breath, no sore throat, no vomiting and no wheezing        Prior to Admission Medications   Prescriptions Last Dose Informant Patient Reported? Taking?    MELATONIN PO   Yes No   Si mg by Per G Tube route daily at bedtime   Multiple Vitamins-Minerals (CENTRUM SILVER PO)   Yes No   Si tablet by Per G Tube route daily   Probiotic Product (ALEXANDER-BID PROBIOTIC PO)   Yes No   Si tablet by Per G Tube route daily     Sennosides-Docusate Sodium (SENEXON-S PO)   Yes No   Si tablet by Per G Tube route daily     Sodium Phosphates (FLEET ENEMA RE)   Yes No   Sig: Insert into the rectum as needed   TOPIRAMATE PO   Yes No   Si mg by Per G Tube route 2 (two) times a day   Valproic Acid (DEPAKENE) 250 MG/5ML soln   No No   Sig: 10 mL by Per G Tube route every 8 (eight) hours   White Petrolatum-Mineral Oil (SYSTANE NIGHTTIME) OINT   Yes No   Sig: Apply 1 application to eye 3 (three) times a day Both eyes    acetaminophen (TYLENOL) 325 mg tablet   Yes No   Si mg by Per G Tube route every 6 (six) hours as needed for mild pain   cloBAZam (ONFI) tablet   Yes No   Sig: 10 mg by Per G Tube route daily at bedtime   lactulose 20 g/30 mL   Yes No   Si g by Per G Tube route 3 (three) times a day   levETIRAcetam (KEPPRA) 100 mg/mL oral solution   No No   Sig: 10 mL by Per G Tube route every 12 (twelve) hours for 30 days   levOCARNitine (CARNITOR) 1 g/10 mL solution   No No   Sig: 10 mL by Per G Tube route 2 (two) times a day with meals   Patient taking differently: 500 mg by Per G Tube route 3 (three) times a day     magnesium hydroxide (MILK OF MAGNESIA) 400 mg/5 mL oral suspension   Yes No   Sig: Take 30 mL by mouth daily as needed for constipation   perampanel (FYCOMPA) oral suspension   Yes No   Sig: 10 mg daily at bedtime Per G tube   potassium chloride 10 %   Yes No   Si mEq by Per G Tube route daily   rufinamide (BANZEL) 400 mg tablet   No No   Sig: 3 tablets by Per G Tube route 2 (two) times a day      Facility-Administered Medications: None       Past Medical History:   Diagnosis Date    ADHD     Anoxic brain damage (HCC)     Autistic disorder     Epilepsy ([facility] [address] )     Hyperammonemia (HCC)     Hyperkeratosis     Hypotension     Hypotension     Intellectual disability     Intellectual disability     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with tonic seizures (HCC)     Lethargy     Liver enzyme elevation     Onychomycosis     Osteoporosis     Osteoporosis     Psychiatric disorder     anxiety    Seizures (HCC)        Past Surgical History:   Procedure Laterality Date    CARDIAC PACEMAKER PLACEMENT      JEJUNOSTOMY FEEDING TUBE      PEG TUBE PLACEMENT         History reviewed  No pertinent family history  I have reviewed and agree with the history as documented      Social History   Substance Use Topics    Smoking status: Never Smoker    Smokeless tobacco: Never Used  Alcohol use No        Review of Systems   Constitutional: Negative for chills and fever  HENT: Negative for congestion, rhinorrhea, sore throat and trouble swallowing  Eyes: Negative  Respiratory: Negative for cough, chest tightness, shortness of breath and wheezing  Cardiovascular: Negative for chest pain, palpitations and leg swelling  Gastrointestinal: Negative for abdominal pain, diarrhea, nausea and vomiting  Genitourinary: Negative for dysuria, flank pain, frequency and urgency  Musculoskeletal: Negative for back pain, neck pain and neck stiffness  Skin: Negative for pallor and rash  Neurological: Negative for dizziness, loss of consciousness, syncope, weakness, light-headedness, numbness and headaches  Hematological: Negative for adenopathy  Psychiatric/Behavioral: Negative for confusion  The patient is not nervous/anxious  All other systems reviewed and are negative  Physical Exam  ED Triage Vitals [04/10/18 1139]   Temperature Pulse Respirations Blood Pressure SpO2   (!) 97 4 °F (36 3 °C) 82 18 95/61 99 %      Temp src Heart Rate Source Patient Position - Orthostatic VS BP Location FiO2 (%)   -- Monitor -- -- --      Pain Score       No Pain           Orthostatic Vital Signs  Vitals:    04/10/18 1139   BP: 95/61   Pulse: 82       Physical Exam   Constitutional: She is oriented to person, place, and time  She appears well-developed and well-nourished  No distress  HENT:   Head: Normocephalic and atraumatic  Mouth/Throat: Oropharynx is clear and moist    Eyes: EOM are normal  Pupils are equal, round, and reactive to light  Neck: Normal range of motion  Neck supple  Cardiovascular: Normal rate, regular rhythm and normal heart sounds  No murmur heard  Pulmonary/Chest: Effort normal and breath sounds normal  No respiratory distress  She has no wheezes  She exhibits no tenderness  Abdominal: Soft  Bowel sounds are normal  There is no tenderness     Peg site is clean and intact, minimal clear drainage present  Site appears patent  Intact 18 Fr PEG tube with still inflated 10 mL cuff present with patient  Similar, new PEG tube reinserted without difficulty and with immediate return of gastric contents  Flange adjusted for nonischemic tension and padded with a drain dressing underneath the flange and an ABD above the flange for protection from mechanical dislodgement  Patient tolerated procedure well  Musculoskeletal: She exhibits no edema or tenderness  Neurological: She is alert and oriented to person, place, and time  Skin: Skin is warm and dry  No rash noted  She is not diaphoretic  Psychiatric: She has a normal mood and affect  Her behavior is normal    Vitals reviewed  ED Medications  Medications - No data to display    Diagnostic Studies  Results Reviewed     None                 No orders to display              Procedures  Procedures       Phone Contacts  ED Phone Contact    ED Course  ED Course                                MDM  Number of Diagnoses or Management Options  Complication of gastrostomy tube [facility]):   Diagnosis management comments: DDx:  Mechanically dislodged PEG tube  A/P: Will replace PEG tube, reevaluate for further work up and disposition  Amount and/or Complexity of Data Reviewed  Obtain history from someone other than the patient: yes (EMS, group home staff)  Review and summarize past medical records: yes      CritCare Time    Disposition  Final diagnoses:   Complication of gastrostomy tube [facility])     Time reflects when diagnosis was documented in both MDM as applicable and the Disposition within this note     Time User Action Codes Description Comment    4/10/2018 11:50 AM Zina Andersen Add [Q50 21] Complication of gastrostomy tube [facility])       ED Disposition     ED Disposition Condition Comment    Discharge  Zully Alfred [facility] discharge to home/self care      Condition at discharge: Stable        Follow-up Information Follow up With Specialties Details Why [facility],  Family Medicine Schedule an appointment as soon as possible for a visit If symptoms worsen [facility] [address]  [phone]          Discharge Medication List as of 4/10/2018 11:50 AM      CONTINUE these medications which have NOT CHANGED    Details   acetaminophen (TYLENOL) 325 mg tablet 650 mg by Per G Tube route every 6 (six) hours as needed for mild pain, Historical Med      cloBAZam (ONFI) tablet 10 mg by Per G Tube route daily at bedtime, Historical Med      lactulose 20 g/30 mL 35 g by Per G Tube route 3 (three) times a day, Historical Med      levETIRAcetam (KEPPRA) 100 mg/mL oral solution 10 mL by Per G Tube route every 12 (twelve) hours for 30 days, Starting Thu 10/26/2017, Until Sun 3/4/2018, Print      levOCARNitine (CARNITOR) 1 g/10 mL solution 10 mL by Per G Tube route 2 (two) times a day with meals, Starting Thu 12/7/2017, No Print      magnesium hydroxide (MILK OF MAGNESIA) 400 mg/5 mL oral suspension Take 30 mL by mouth daily as needed for constipation, Historical Med      MELATONIN PO 6 mg by Per G Tube route daily at bedtime, Historical Med      Multiple Vitamins-Minerals (CENTRUM SILVER PO) 1 tablet by Per G Tube route daily, Until Discontinued, Historical Med      perampanel (FYCOMPA) oral suspension 10 mg daily at bedtime Per G tube, Historical Med      potassium chloride 10 % 40 mEq by Per G Tube route daily, Historical Med      Probiotic Product (ALEXANDER-BID PROBIOTIC PO) 1 tablet by Per G Tube route daily  , Historical Med      rufinamide (BANZEL) 400 mg tablet 3 tablets by Per G Tube route 2 (two) times a day, Starting Thu 12/7/2017, No Print      Sennosides-Docusate Sodium (SENEXON-S PO) 1 tablet by Per G Tube route daily  , Until Discontinued, Historical Med      Sodium Phosphates (FLEET ENEMA RE) Insert into the rectum as needed, Historical Med      TOPIRAMATE  mg by Per G Tube route 2 (two) times a day, Historical Med      Valproic Acid (DEPAKENE) 250 MG/5ML soln 10 mL by Per G Tube route every 8 (eight) hours, Starting u 12/7/2017, No Print      White Petrolatum-Mineral Oil (SYSTANE NIGHTTIME) OINT Apply 1 application to eye 3 (three) times a day Both eyes , Until Discontinued, Historical Med           No discharge procedures on file      ED Provider  Electronically Signed by           Tiana Abdi DO  04/10/18 7529

## 2018-04-10 NOTE — DISCHARGE INSTRUCTIONS
How to Use and Care for Your PEG Tube   WHAT YOU NEED TO KNOW:   A percutaneous endoscopic gastrostomy (PEG) tube is a plastic tube that is put into your stomach through your skin  PEG tubes are most often used to give you food or liquids if you are not able to eat or drink  Medical formula, medicines, and water can be given through a PEG tube  DISCHARGE INSTRUCTIONS:   Return to the emergency department if:   · You start coughing or vomiting during or after a feeding  · You have severe abdominal pain  · Blood or tube feeding fluid leaks from the PEG tube site  · Your PEG tube is shorter than it was when it was put in     · Your PEG tube comes out  · Your mouth feels dry, your heart feels like it is beating too fast, or you feel weak  Contact your healthcare provider if:   · You have nausea, diarrhea, or abdominal bloating or discomfort  · You have stomach pain after each feeding or when you move around  · You have discomfort or pain around your PEG tube site  · The skin around your PEG tube is red, swollen, or draining pus  · You weigh less than your healthcare provider says you should  · Your PEG tube is longer than it was when it was put in     · You have questions or concerns about your condition or care  How to use your PEG tube: Your healthcare provider will tell you when and how often to use your PEG tube for feedings  You may need a bolus, intermittent, or continuous feeding  A bolus feeding is when formula is give over a short period of time  An intermittent feeding is scheduled for certain times throughout the day  Continuous feedings run all the time  Ask your healthcare provider which method is best for you:  · Use a feeding syringe  Connect the feeding syringe to the end of the PEG tube  Pour the correct amount of formula into the syringe  Hold the syringe up high  Formula will flow into the PEG tube   The syringe plunger may be used to gently push the last of the formula through the PEG tube  · Use a gravity drip bag  Connect the tubing from the gravity drip bag to the end of the PEG tube  Pour the formula into a gravity drip bag  Hang the bag on a medical pole, a , or other device  Adjust the flow rate on the tubing according to your healthcare provider's instructions  Formula will flow into the PEG tube  Ask how long it should take to complete your feeding  · Use a feeding pump  You may use an electric pump to control the flow of the formula into your PEG tube  Your healthcare provider will teach you how to set up and use the pump  How to care for your PEG tube:   · Always flush your PEG tube before and after each use  This helps prevent blockage from formula or medicine  Use at least 2 tablespoons (30 milliliters) of water to flush the tube  Follow directions for flushing your PEG tube  · If your PEG tube becomes clogged, try to unclog it as soon as you can  Flush your PEG tube with a 60 milliliter (mL) syringe filled with warm water  Never use a wire to unclog the tube  A wire can poke a hole in the tube  Your healthcare provider may have you use a special medicine or a plastic brush to help unclog your tube  · Check the PEG tube daily  ¨ Check the length of the tube from the end to where it goes into your body  If it gets longer, it may be at risk for coming out  If it gets shorter, let your healthcare provider know right away  ¨ Check the bumper  (piece that goes around the tube, next to your skin)  It should be snug against your skin  Tell your healthcare provider if the bumper seems too tight or too loose  · Use an alcohol pad to clean the end of your PEG tube  Do this before you connect tubing or a syringe to your PEG tube and after you remove it  When you disconnect tubing or a syringe from your PEG tube, do not let the end of the PEG tube touch anything  How do I care for the skin around my PEG tube?    · Do not remove the stitches or medical tape that hold your PEG tube in place  when you first get it  Your healthcare provider will take them off once the skin around your tube heals  Leave clean bandages over the tube area for the first 24 hours after the tube is put in  You may not need to use bandages after 24 hours if the skin around the tube looks dry  Ask when you can shower or bathe  · Routine skin care:      ¨ Clean the skin around your tube 1 to 2 times each day  Ask your healthcare provider what you should use to clean your skin  Check for redness and swelling in the area where the tube goes into your body  Check for fluid draining from your stoma (the hole where the tube was put in)  ¨ Gently turn your tube daily  after your stitches come out  This may decrease pressure on your skin under the bumper  It may also help prevent an infection  ¨ Keep the skin around your PEG tube dry  This will help prevent skin irritation and infection  · Use topical medicines as directed  You may need to put antibiotic cream on the skin around your tube after you are done cleaning it  Other tips to know about a PEG tube:   · You may need to keep track of how much formula and other liquids you have each day  You may also need to keep track of how much you urinate and how many times you have a bowel movement each day  Bring this record to your follow-up visits  · You may need to check your weight daily or weekly  Keep a record of your weights and bring it to your follow-up visits  Your healthcare provider may need to change your feedings if your weight changes too quickly  · Take your medicines as directed  Learn which of your medicines can be crushed, mixed with water, and given through the PEG tube  Certain medicines should not be crushed or may clog the PEG tube  · Go to all follow-up appointments  You may need to have blood tests and other tests when you see your healthcare provider    © 2017 [employer] [facility] is for End User's use only and may not be sold, redistributed or otherwise used for commercial purposes  All illustrations and images included in CareNotes® are the copyrighted property of A D A M [employer] Inc  or Jason Chiu  The above information is an  only  It is not intended as medical advice for individual conditions or treatments  Talk to your doctor, nurse or pharmacist before following any medical regimen to see if it is safe and effective for you

## 2018-04-20 RX ORDER — PROBIOTIC PRODUCT - TAB 1B-250 MG
1 TAB ORAL DAILY
COMMUNITY
End: 2018-04-23 | Stop reason: SDUPTHER

## 2018-04-20 RX ORDER — CIPROFLOXACIN HYDROCHLORIDE 3.5 MG/ML
SOLUTION/ DROPS TOPICAL
COMMUNITY
End: 2018-08-13 | Stop reason: ALTCHOICE

## 2018-04-20 RX ORDER — VALPROIC ACID 250 MG/5ML
10 SOLUTION ORAL EVERY 8 HOURS
COMMUNITY
Start: 2017-04-07 | End: 2018-04-23 | Stop reason: SDUPTHER

## 2018-04-20 RX ORDER — ONDANSETRON 4 MG/1
TABLET, ORALLY DISINTEGRATING ORAL
COMMUNITY
End: 2018-04-23

## 2018-04-23 ENCOUNTER — OFFICE VISIT (OUTPATIENT)
Dept: NEUROLOGY | Facility: CLINIC | Age: 38
End: 2018-04-23
Payer: MEDICARE

## 2018-04-23 VITALS
HEIGHT: 62 IN | WEIGHT: 101 LBS | RESPIRATION RATE: 16 BRPM | BODY MASS INDEX: 18.58 KG/M2 | DIASTOLIC BLOOD PRESSURE: 57 MMHG | SYSTOLIC BLOOD PRESSURE: 90 MMHG | HEART RATE: 85 BPM

## 2018-04-23 DIAGNOSIS — G47.19 EXCESSIVE DAYTIME SLEEPINESS: ICD-10-CM

## 2018-04-23 DIAGNOSIS — E72.20 HYPERAMMONEMIA (HCC): ICD-10-CM

## 2018-04-23 DIAGNOSIS — R53.83 LETHARGY: ICD-10-CM

## 2018-04-23 DIAGNOSIS — G40.812 LENNOX-GASTAUT SYNDROME WITH TONIC SEIZURES (HCC): Primary | ICD-10-CM

## 2018-04-23 PROBLEM — G40.909 EPILEPSY (HCC): Status: ACTIVE | Noted: 2018-04-23

## 2018-04-23 PROBLEM — G40.919 INTRACTABLE EPILEPSY WITHOUT STATUS EPILEPTICUS (HCC): Status: ACTIVE | Noted: 2018-04-23

## 2018-04-23 PROCEDURE — 95971 ALYS SMPL SP/PN NPGT W/PRGRM: CPT | Performed by: PSYCHIATRY & NEUROLOGY

## 2018-04-23 PROCEDURE — 99215 OFFICE O/P EST HI 40 MIN: CPT | Performed by: PSYCHIATRY & NEUROLOGY

## 2018-04-23 RX ORDER — VALPROIC ACID 250 MG/5ML
500 SOLUTION ORAL EVERY 8 HOURS
Qty: 950 ML | Refills: 3 | Status: ON HOLD | OUTPATIENT
Start: 2018-04-23 | End: 2018-08-24

## 2018-04-23 RX ORDER — CLOBAZAM 10 MG/1
10 TABLET ORAL
Qty: 7 TABLET | Refills: 0 | Status: SHIPPED | OUTPATIENT
Start: 2018-04-23 | End: 2018-08-24 | Stop reason: HOSPADM

## 2018-04-23 RX ORDER — RUFINAMIDE 400 MG/1
1200 TABLET, FILM COATED ORAL 2 TIMES DAILY
Qty: 180 TABLET | Refills: 3 | Status: SHIPPED | OUTPATIENT
Start: 2018-04-23 | End: 2018-09-04 | Stop reason: SDUPTHER

## 2018-04-23 RX ORDER — PHENOBARBITAL 20 MG/5ML
ELIXIR ORAL
Qty: 473 ML | Refills: 5 | Status: ON HOLD | OUTPATIENT
Start: 2018-04-23 | End: 2018-08-24

## 2018-04-23 RX ORDER — LEVETIRACETAM 100 MG/ML
1000 SOLUTION ORAL EVERY 12 HOURS SCHEDULED
Qty: 600 ML | Refills: 5 | Status: SHIPPED | OUTPATIENT
Start: 2018-04-23 | End: 2018-09-04 | Stop reason: SDUPTHER

## 2018-04-23 RX ORDER — LEVOCARNITINE 1 G/10ML
500 SOLUTION ORAL 3 TIMES DAILY
Qty: 474 ML | Refills: 5 | Status: ON HOLD | OUTPATIENT
Start: 2018-04-23 | End: 2018-08-24

## 2018-04-23 RX ORDER — TOPIRAMATE 50 MG/1
TABLET, FILM COATED ORAL
Qty: 60 TABLET | Refills: 3 | Status: SHIPPED | OUTPATIENT
Start: 2018-04-23 | End: 2018-09-04 | Stop reason: SDUPTHER

## 2018-04-23 NOTE — PROGRESS NOTES
Neurology/Epilepsy Procedure Note    VNS Interrogation and Reprogramming     VNS Interrogation    Model: 105  S/N: [id number]  Date of implant: 11/3/2015    Current settings:  Output Current 2 00 mAmp   Signal Frequency 30 Hz   Pulse Width 500 microsec   Signal On Time 14 sec   Signal Off Time 0 5 min     Magnet settings:  Mag Output 2 25 mAmp   Pulse Width 60 microsec   Mag On Time 500 sec     Diagnostics:  Communication OK  Output current 2 mAmp  Lead Impedance Okay  Impedance value 1776 Ohm  IFI OK  Battery indicator 25-50%    Reprogramming settings:  Output Current 2 00 mAmp   Signal Frequency 30 Hz   Pulse Width 500 microsec   Signal On Time 21 sec   Signal Off Time 0 8 min     Magnet settings:  Mag Output 2 25 mAmp   Pulse Width 60 microsec   Mag On Time 500 sec       Lifetime Magnet activation 7  % stimulation 41%

## 2018-04-23 NOTE — PROGRESS NOTES
Patient's Name: Minoo Mckay   Patient's : 1980   Visit Type: follow-up  Referring MD / PCP:  Isadora Lamas DO     Assessment:    Ms Daysi Napier is a 40 y o  woman with Kyra Mercer Syndrome, remote history of anoxic brain injury, h/o status epilepticus (extremely frequent tonic seizures during sleep), severe intellectual disability  She has a VNS due to intractable epilepsy  Based on prior history of medication changes and missed doses it seems that Ms Bull needs at least topiramate and valproic acid to prevent seizures  She had an increase seizure frequency when Fycompa was held  It is unclear if levetiracetam or Banzel has offered much in the way of seizure prevention  With the reduction of Karo Salk, Ms Merna Mirza is more alert, however, there has been more daytime seizures  At this point, she should come off of Karo Salk as it caused more side effects, and her seizure frequency is not much different more noticeable  Recommendation is to start phenobarbital, which may act on the same RENETTA receptors as Onfi and may not produce metabolites that can cause excessive sedation  If she becomes more sedated with phenobarbital then we will have to consider alternative medication such as Briviact or zonisamide  It is postulated that the excessive ammonia level is due to abnormal mitochondrial activity with valproate exposure  Due to improvement with seizure control with valproate, supplementation with L-carnitine is necessary to counteract hyperammoniemia  We reduced VNS duty cycle as it does not appear to be helping control seizures and is causing the battery to run out sooner  Merna Mirza has intractable epilepsy, unlikely to achieve seizure freedom and she is not a surgical candidate due to the underlying generalized seizures       Plan:   Problem List Items Addressed This Visit        Nervous and Auditory    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with tonic seizures (HCC) - Primary    Relevant Medications levETIRAcetam (KEPPRA) 100 mg/mL oral solution    levOCARNitine (CARNITOR) 1 g/10 mL solution    perampanel (FYCOMPA) oral suspension    rufinamide (BANZEL) 400 mg tablet    Valproic Acid (DEPAKENE) 250 MG/5ML soln    PHENobarbital 20 mg/5 mL elixir    cloBAZam (ONFI) tablet    topiramate (TOPAMAX) 200 MG tablet    topiramate (TOPAMAX) 50 MG tablet    Other Relevant Orders    CBC    Comprehensive metabolic panel    Levetiracetam level    Phenobarbital level    Topiramate level    MISCELLANEOUS LAB TEST    Valproic acid level, free       Other    Excessive daytime sleepiness    Hyperammonemia (HCC)    Lethargy          Patient Instructions       PLAN:  1 - Valproic acid 250mg/5mL give 10mL three times a day   2 - Banzel 400mg give 3 tabs twice a day   3 - Topiramate 50mg and 200mg tab, one each twice a day   4 - Levetiracetam 100mg/mL give 10mL twice a day   5 - Fycompa 0 5mg/mL oral solution give 20mL (10mg) at bedtime   6 - continue with Onfi 10mg at bedtime for 7 doses then discontinue  7 - start phenobarbital elixir 20mg/5mL give 7 5 mL (30mg) at bedtime for 7 days, then 15mL (60mg) at bedtime  8 - in one month check drug levels,phenobarbital, topiramate, valproic acid free, levetiracetam, CBC, CMP  9 - continue with levocarnitine 1gm/10mL give 5mL three times a day   10 - follow-up in 3 months, SOVL      Chief Complaint:    Chief Complaint     Seizures        HPI:    Brittany Guerra is a 40 y o  unknown handed female here for follow-up evaluation of intractable epilepsy in the setting of LGS  Interval History 4/23/2018  Ms Dayanara Enriquez is more awake today than I have previously seen  I spoke with the patient's unit nurse, Evelyn Wick was reduced to 10mg at bedtime, less somnolent during the day time but there have been increase in the number of seizures  During the day time, there are more seizure activity (these are the eyes are rolling back and shaking, then stop, then happen again in 10-15 minutes)    These are occurring more so when she is awake in her chair  She is more awake now, she is more alert and eating more  AED/side effects/compliance:  Valproic acid 250mg/5mL give 10mL three times a day   Banzel 400mg give 3 tabs twice a day   Topiramate 50mg and 200mg tab, one each twice a day   Levetiracetam 100mg/mL give 10mL twice a day   Fycompa 10mg at bedtime (oral solution)  Onfi to 10mg at bedtime   - continue with levocarnitine 1gm/10mL give 5mL three times a day     Event/Seizure semiology:  Seizure semiology:  1  She was described to have drop attacks or spells with grunting sounds and falling to the floor  2  Her nurse commented on episodes of spasms or myoclonic jerking of the right arm  3  Generalized convulsions  4  Tonic seizures of eyes rolling back (mostly during sleep)    Prior Epilepsy History:  Collective epilepsy history 11/6/2014 was previously evaluated by Dr Sirisha Kirk including a hospitalization in February 2013 for status epilepticus, in the setting of missed doses of AEDs due to refusal to eat  She subsequently required anesthetic induced coma to stop her seizures and PEG tube was placed  She was seen almost every month for management of her seizures up until November 2013  She has medically refractory seizures and had a VNS placed possibly in the early 2000s and a generator changed in 2011  Unknown AEDs that were tried but felbamate is listed as an allergy  In 2011, her AEDs were clonazepam, levetiracetam, Depakote and Lamictal  Dr Sirisha Kirk had started [facility] in 2011  In 2012, Saadia Winston was added with the reduction of clonazepam  There were difficulty with documenting seizure frequency; but seizures were no longer daily occurrences but there were clusters of seizures  There would be good periods and bad periods of seizure frequency  Dr Sirisha Kirk had been increasing the duty cycle of the VNS over the course of 2012 to 2013  Sometime between August 2013 and October 2013, topiramate was introduced  was in status epilepticus in 2013, she was on Keppra, Banzel, Onfi, and Lamictal  She had an EEG at some point that showed multifocal spike-wave and background attenuation  She has intermittent agitation and day time somnolence  When she was hospitalized for status epilepticus, she was started on methylphenidate to help wake her up  Intake history November 2014:   VPA level 127  Her Valproic Acid dose was previously 250mg q6hrs based on Dr Deniece Leyden notes  Since July 2014, she has been receiving VPA 500mg q6hrs  She has not been hospitalized since September 2013  She was previously ambulatory with therapy  She spends most of her time sleeping for the past couple of months  She has also been receiving lactulose for elevated ammonia levels  (older drugs VPA, LVT, LMG, newer drugs added on since 2011 RFN, Onfi, TPM)     Summary of 2015: We decreased VPA from TDD 2000mg to 1000mg with increased agitation/combativeness, alternating days of exhaustion (no behavioral specialist has been involved)  are randomly reported by multiple staff members  Seizures are typically reported by CNA's or her one to one on the 3PM to 11PM shift  Seizures are described a brief events of eyes rolling back and arms going up in the air, followed by hair pulling or slapping herself  These seizures were reported as either sporadic or every other day episodes  There have been no documented grand mal seizures or drop attacks  She refuses to wear safety helmet, rips at her hair, and attempts to flip herself out of her chair and bed  Jon Hammonds can walk briefly but walks on her toes, she frequently will allow herself to fall down when she does not want to walk  It does not appear that methylphenidate has been useful in maintaining her level of wakefulness during the day  Jon Hammonds had her VNS generator change on 11/3/2015  The Model 103 (serial [id number]) was replaced with IQumulus Model 105, serial [id number]   Weaned off of levetiracetam due to multiple medications and agitation; by the end of 2016, she was down to -500  Summary of 2016:  Started with RFN [id number], -250, CLB 0 5-0 5-25,  QID, -200 attempted to wean off of LVT and reduced the dose of VPA given that there were no reports of seizures and she was sedated, along with elevated ammonia levels  It was unclear as to how effective LVT was for her seizure control  When she missed a dose of TPM in September 2016, she had multiple seizures  We attempted to compensate for increase in seizures by increasing Onfi to 20-20; however, it seems that it made her more sedated  Summary of 2017  RFN [id number], -250, Onfi 20-20, -200,  q8hrs  She has been more somnolent  She continues to have tonic seizures when she is asleep, eyes rolling body, arms will tense up with some twitching, last a few seconds, but will recur  There are no seizures during the daytime  We attempted to wean off of VPA due to ammonia / somnolence; but there was an increase in tonic seizures (tonic stiffness, eyes rolling upwards, and subtle arm (right?) jerking)  She was admitted to hospital 10/10 to 10/26/2017, due to seizures, was put on continuous video EEG monitoring to determine her seizure type, seizure frequency, and rapid medication adjustments  The increased in seizure frequency was likely due to an underlying infection, but also she likely had frequent seizures during sleep which did not cause much injury to the patient  A number of medication adjustments were attempted including discontinuation of lamotrigine due to concern about it worsening seizures, reinstitution of levetiracetam, attempted discontinuation of valproic acid, attempted reduction in Onfi, and starting Fycompa  The patient's seizures were mostly based during sleep, tonic seizures  When valproic acid was discontinued there was an increased number of seizures   Valproic acid was subsequently reinstituted  Patient was discharged on reduced dose of Onfi to keep her more awake during the daytime however this instruction was not forwarded to her nursing home  There does not seem to be a change in degree of alertness or activity with these current medications  Interval history 2018   RFN [id number], Fycompa 8mg daily, LVT 2482-8599, Onfi 5-15, -250,  TID  She was admitted to hospital again in December for pneumonia, put on continuous EEG monitoring and was found to still have frequent tonic seizures while asleep  She required higher doses of VPA to control seizures and lower dose of Onfi to help with excess sedation  Banzel was also decreased  On the last day of cvEEG 2017 - there were still many brief tonic seizures nearly all out of sleep (consisting of rhythmic generalized spike slow waves and paroxysmal fast activity)  Special Features  Status epilepticus: yes  Self Injury Seizures: No  Precipitating Factors: menstrual cycle? ?     Epilepsy Risk Factors:  Abnormal pregnancy: unknown  Abnormal birth/: unknown  Abnormal Development: unknown developmental history  Febrile seizures, simple: unknown  Febrile seizures, complex: unknown  CNS infection: No  Mental retardation: Yes  Cerebral palsy: Yes  Head injury (moderate/severe): possibly anoxic brain injury  CNS neoplasm: No  CNS malformation: No  Neurosurgical procedure: No  Stroke: No  Alcohol abuse: No  Drug abuse: No  Family history Sz/epilepsy: unknown    Prior AEDs:  Rufinamide  Clobazam (excessive sedation)  Clonazepam  Levetiracetam (unclear if beneficial)  Lamotrigine (unclear if beneficial)  Topiramate (missed doses caused breakthrough seizures)  Valproate (as medication was weaned off, there were increased seizures)  Fycompa  Felbamate (listed as a drug allergy)    Prior workup:  x   Imaging:  CT head 2013  Normal CT of brain    EEGs:  EEGs:  Continuous video EEG monitoring 10/13 - 10/15/2017  Frequent generalized spike/polyspike-slow wave complexes during sleep  At times there are independent right more than left midtemporal spikes and bitemporal spikes    Innumerable generalized tonic seizures during sleep, generalized 1 Hz period discharges, that would become continuous for 30 seconds or generalized rhythmic alpha-beta discharges, associated with patient becoming rigid, tonic posturing with arms elevated and tense with subtle jerking of the upper body, eyes opening with upward deviation  Dr Chemo Olson reported 4 ictal patterns:  1 - 1-3 seconds of diffuse electrodecrement then 2-7 seconds of fast activity then 2Hz spike wave discharges (no clinical manifestation)  2 - same as #1, clinically accompanied by posturing of the arms, then clonic jerking  3 - diffuse low fast activity without progressing to spike-wave discharges  4 - 2 Hz generalized discharges for 2-3 minutes with no clonical manifestations  By 10/15/2017 - the tonic clonic seizures were less frequent    Continuous video EEG monitoring 10/18 - 10/25/2017  Diffuse background slowing with bursts of arrhythmic generalized spike wave discharges, with shifting lateralization, and independent left and  right temporal spikes  Mild eyelid myoclonia associated with brief bursts of 2-2 5 Hz generalized discharges  Tonic seizures with eelctrodecrement  There were innumerable tonic seizures; however by 10/25/2017 the frequency of these seizures did decrease in frequency  Continuous video EEG monitoring 12/1-12/5/2017  There are innumerable tonic seizures that are generally less than one minute in duration (30-40 seconds), these consists of subtle eye opening and tonic stiffening of arms, if longer then whole body stiffening with clonic jerking movements  These almost always occur exclusively out of sleep    These consist of 2-2 5 Hz generalized spike-slow wave complexes with generalized attenuation of activity (desynchronization) or paroxysmal fast activity  Per 24 hours count of seizures could be   Labs:  12/7/2017  CBC 9 3/11 8/37/184  /3 8/116/26/19/0 5/137  VPA 76  Ammonia 67    General exam   Blood pressure 90/57, pulse 85, resp  rate 16, height 5' 2" (1 575 m), weight 45 8 kg (101 lb)    Appearance: chronically ill appearing, awake, minimally interactive by resisting examination, pushing away objects  Carotids: n/a  Cardiovascular: regular rate and rhythm  Pulmonary: unable to hear  Abdominal: soft, nontender, nondistended  Extremities: no edema    HEENT: anicteric and moist mucus membranes / oral cavity   Fundoscopy: not assessed    Mental status  Orientation: nonverbal  Fund of Knowledge: nonverbal   Attention and Concentration: nonverbal  Current and Remote Memory:nonverbal  Language: nonverbal    Cranial Nerves  CN 1: not tested  CN 2: pupils equal round reactive to direct and consenual light   CN 3, 4, 6: EOMI, no nystagmus  CN 5:corneal reflex is intact symmetrically  CN 7:muscles of facial expression are symmetric and corneal reflex is intact symmetrically  CN 8:not assessed  CN 9, 10:not assessed  CN 11:not assessed  CN 12:not assessed    Motor:  Bulk, Tone: thin muscle build, relatively hypotonic tone  Pronation: not assessed  Strength: symmetric arm movements; patient minimally moves the legs    Sensory:  Lighttouch: not assessed due to cognitive impairment    Coordination:  Patient cannot walk without assistance, unable to perform tasks    Reflexes:  not assessed    Past Medical/Surgical History:  Patient Active Problem List   Diagnosis    Dysphagia    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with tonic seizures (HCC)    Lactic acidosis    Gastrostomy tube obstruction (HCC)    Excessive daytime sleepiness    Underweight    Intellectual disability    Hyperammonemia (HCC)    Acute DANIEL (middle ear effusion)    Macrocytic anemia    Hypokalemia    Hypernatremia    Osteoporosis    Onychomycosis    Lethargy    Hyperkeratosis    Elevated liver enzymes    ADHD, predominantly inattentive type    Intractable epilepsy without status epilepticus ([facility] [address] )     Past Medical History:   Diagnosis Date    ADHD     Anoxic brain damage (HCC)     Autistic disorder     Hyperammonemia (HCC)     Hyperkeratosis     Hypotension     Intellectual disability     Intellectual disability     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with tonic seizures (HCC)     Lethargy     Liver enzyme elevation     Onychomycosis     Osteoporosis     Osteoporosis     Psychiatric disorder     anxiety     Past Surgical History:   Procedure Laterality Date    CARDIAC PACEMAKER PLACEMENT      JEJUNOSTOMY FEEDING TUBE      PEG TUBE PLACEMENT         Past Psychiatric History:  Behavioral agitation    Medications:    Current Outpatient Prescriptions:     acetaminophen (TYLENOL) 325 mg tablet, 650 mg by Per G Tube route every 6 (six) hours as needed for mild pain, Disp: , Rfl:     ciprofloxacin (CILOXAN) 0 3 % ophthalmic solution, Apply to eye, Disp: , Rfl:     lactulose 20 g/30 mL, 35 g by Per G Tube route 3 (three) times a day, Disp: , Rfl:     levETIRAcetam (KEPPRA) 100 mg/mL oral solution, 10 mL (1,000 mg total) by Per G Tube route every 12 (twelve) hours for 30 days, Disp: 600 mL, Rfl: 5    levOCARNitine (CARNITOR) 1 g/10 mL solution, 5 mL (500 mg total) by Per G Tube route 3 (three) times a day, Disp: 474 mL, Rfl: 5    magnesium hydroxide (MILK OF MAGNESIA) 400 mg/5 mL oral suspension, Take 30 mL by mouth daily as needed for constipation, Disp: , Rfl:     MELATONIN PO, 6 mg by Per G Tube route daily at bedtime, Disp: , Rfl:     Multiple Vitamins-Minerals (CENTRUM SILVER PO), 1 tablet by Per G Tube route daily, Disp: , Rfl:     perampanel (FYCOMPA) oral suspension, 20 mL (10 mg total) by Per NG Tube route daily at bedtime Per G tube, Disp: 600 mL, Rfl: 3    potassium chloride 10 %, 40 mEq by Per G Tube route daily, Disp: , Rfl:     Probiotic Product (ALEXANDER-BID PROBIOTIC PO), 1 tablet by Per G Tube route daily  , Disp: , Rfl:     rufinamide (BANZEL) 400 mg tablet, 3 tablets (1,200 mg total) by Per G Tube route 2 (two) times a day, Disp: 180 tablet, Rfl: 3    Sennosides-Docusate Sodium (SENEXON-S PO), 1 tablet by Per G Tube route daily  , Disp: , Rfl:     Sodium Phosphates (FLEET ENEMA RE), Insert into the rectum as needed, Disp: , Rfl:     Valproic Acid (DEPAKENE) 250 MG/5ML soln, 10 mL (500 mg total) by Per G Tube route every 8 (eight) hours, Disp: 950 mL, Rfl: 3    White Petrolatum-Mineral Oil (SYSTANE NIGHTTIME) OINT, Apply 1 application to eye 3 (three) times a day Both eyes , Disp: , Rfl:     cloBAZam (ONFI) tablet, Take 1 tablet (10 mg total) by mouth daily at bedtime for 7 doses Then discontinue, Disp: 7 tablet, Rfl: 0    PHENobarbital 20 mg/5 mL elixir, Give 7 5mL at bedtime for 7 days, then increase to 15mL at bedtime, Disp: 473 mL, Rfl: 5    topiramate (TOPAMAX) 200 MG tablet, Give one tab with one 50mg tab twice a day, Disp: 60 tablet, Rfl: 3    topiramate (TOPAMAX) 50 MG tablet, Give one tab with one 200mg tab twice a day, Disp: 60 tablet, Rfl: 3    Allergies: Allergies   Allergen Reactions    Felbamate        Family history:  History reviewed  No pertinent family history  There is no family history of seizure, epilepsy or developmental delay  Social History  Living situation:  Resident of [facility]  Social History   Substance Use Topics    Smoking status: Never Smoker    Smokeless tobacco: Never Used    Alcohol use No        Review of Systems  A review of at least 12 organ/systems was obtained by the medical assistant and reviewed by me, including additional positives/negatives:  Neurological: Positive for seizures      Decision making was of high-complexity due to the patient's high risk condition (seizures), psychiatric and neuropsychological comorbidities, behavioral problems, memory and cognitive problems and medication side effects  The total amount of time spent with the patient was 45 minutes  More than 50% of this time was devoted to counseling and coordination of care  Issues addressed during this clinic visit included overall management, medication counseling or monitoring (including adverse effects, side effects and risks of antiepileptic medications)     Start time: 10:30AM  End time: 11:15AM

## 2018-04-23 NOTE — LETTER
2018     Roland Bosworth, DO  [facility] [id number]    Patient: Epifanio Kate   YOB: 1980   Date of Visit: 2018       Dear Dr Pavithra Thao Recipients: Thank you for referring Ana Angeles to me for evaluation  Below are my notes for this consultation  If you have questions, please do not hesitate to call me  I look forward to following your patient along with you  Sincerely,        Amira Scott MD        CC: No Recipients  Amira Scott MD  2018 10:45 AM  Sign at close encounter  Neurology/Epilepsy Procedure Note    VNS Interrogation and Reprogramming     VNS Interrogation    Model: 105  S/N: [id number]  Date of implant: 11/3/2015    Current settings:  Output Current 2 00 mAmp   Signal Frequency 30 Hz   Pulse Width 500 microsec   Signal On Time 14 sec   Signal Off Time 0 5 min     Magnet settings:  Mag Output 2 25 mAmp   Pulse Width 60 microsec   Mag On Time 500 sec     Diagnostics:  Communication OK  Output current 2 mAmp  Lead Impedance Okay  Impedance value 1776 Ohm  IFI OK  Battery indicator 25-50%    Reprogramming settings:  Output Current 2 00 mAmp   Signal Frequency 30 Hz   Pulse Width 500 microsec   Signal On Time 21 sec   Signal Off Time 0 8 min     Magnet settings:  Mag Output 2 25 mAmp   Pulse Width 60 microsec   Mag On Time 500 sec       Lifetime Magnet activation 7  % stimulation 41%    Amira Scott MD  2018 11:58 AM  Sign at close encounter  Patient's Name: Epifanio Kate   Patient's : 1980   Visit Type: follow-up  Referring MD / PCP:  Roland Bosworth, DO     Assessment:    Ms Ana Angeles is a 40 y o  woman with Brittany Bar Syndrome, remote history of anoxic brain injury, h/o status epilepticus (extremely frequent tonic seizures during sleep), severe intellectual disability  She has a VNS due to intractable epilepsy  Based on prior history of medication changes and missed doses it seems that Ms Bull needs at least topiramate and valproic acid to prevent seizures  She had an increase seizure frequency when Fycompa was held  It is unclear if levetiracetam or Banzel has offered much in the way of seizure prevention  With the reduction of Pepper Fake, Ms  Charles Romeo is more alert, however, there has been more daytime seizures  At this point, she should come off of Pepper Fake as it caused more side effects, and her seizure frequency is not much different more noticeable  Recommendation is to start phenobarbital, which may act on the same RENETTA receptors as Onfi and may not produce metabolites that can cause excessive sedation  If she becomes more sedated with phenobarbital then we will have to consider alternative medication such as Briviact or zonisamide  It is postulated that the excessive ammonia level is due to abnormal mitochondrial activity with valproate exposure  Due to improvement with seizure control with valproate, supplementation with L-carnitine is necessary to counteract hyperammoniemia  We reduced VNS duty cycle as it does not appear to be helping control seizures and is causing the battery to run out sooner  Charles Romeo has intractable epilepsy, unlikely to achieve seizure freedom and she is not a surgical candidate due to the underlying generalized seizures       Plan:   Problem List Items Addressed This Visit        Nervous and Auditory    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with tonic seizures (HCC) - Primary    Relevant Medications    levETIRAcetam (KEPPRA) 100 mg/mL oral solution    levOCARNitine (CARNITOR) 1 g/10 mL solution    perampanel (FYCOMPA) oral suspension    rufinamide (BANZEL) 400 mg tablet    Valproic Acid (DEPAKENE) 250 MG/5ML soln    PHENobarbital 20 mg/5 mL elixir    cloBAZam (ONFI) tablet    topiramate (TOPAMAX) 200 MG tablet    topiramate (TOPAMAX) 50 MG tablet    Other Relevant Orders    CBC    Comprehensive metabolic panel    Levetiracetam level    Phenobarbital level    Topiramate level    MISCELLANEOUS LAB TEST Valproic acid level, free       Other    Excessive daytime sleepiness    Hyperammonemia (HCC)    Lethargy          Patient Instructions       PLAN:  1 - Valproic acid 250mg/5mL give 10mL three times a day   2 - Banzel 400mg give 3 tabs twice a day   3 - Topiramate 50mg and 200mg tab, one each twice a day   4 - Levetiracetam 100mg/mL give 10mL twice a day   5 - Fycompa 0 5mg/mL oral solution give 20mL (10mg) at bedtime   6 - continue with Onfi 10mg at bedtime for 7 doses then discontinue  7 - start phenobarbital elixir 20mg/5mL give 7 5 mL (30mg) at bedtime for 7 days, then 15mL (60mg) at bedtime  8 - in one month check drug levels,phenobarbital, topiramate, valproic acid free, levetiracetam, CBC, CMP  9 - continue with levocarnitine 1gm/10mL give 5mL three times a day   10 - follow-up in 3 months, SOVL      Chief Complaint:    Chief Complaint     Seizures        HPI:    Amando Hannah is a 40 y o  unknown handed female here for follow-up evaluation of intractable epilepsy in the setting of LGS  Interval History 4/23/2018  Ms Charles Romeo is more awake today than I have previously seen  I spoke with the patient's unit nurse, Read Arm  Pepper Fake was reduced to 10mg at bedtime, less somnolent during the day time but there have been increase in the number of seizures  During the day time, there are more seizure activity (these are the eyes are rolling back and shaking, then stop, then happen again in 10-15 minutes)  These are occurring more so when she is awake in her chair  She is more awake now, she is more alert and eating more        AED/side effects/compliance:  Valproic acid 250mg/5mL give 10mL three times a day   Banzel 400mg give 3 tabs twice a day   Topiramate 50mg and 200mg tab, one each twice a day   Levetiracetam 100mg/mL give 10mL twice a day   Fycompa 10mg at bedtime (oral solution)  Onfi to 10mg at bedtime   - continue with levocarnitine 1gm/10mL give 5mL three times a day     Event/Seizure semiology:  Seizure semiology:  1  She was described to have drop attacks or spells with grunting sounds and falling to the floor  2  Her nurse commented on episodes of spasms or myoclonic jerking of the right arm  3  Generalized convulsions  4  Tonic seizures of eyes rolling back (mostly during sleep)    Prior Epilepsy History:  Collective epilepsy history 11/6/2014 was previously evaluated by Dr Namita Mead including a hospitalization in February 2013 for status epilepticus, in the setting of missed doses of AEDs due to refusal to eat  She subsequently required anesthetic induced coma to stop her seizures and PEG tube was placed  She was seen almost every month for management of her seizures up until November 2013  She has medically refractory seizures and had a VNS placed possibly in the early 2000s and a generator changed in 2011  Unknown AEDs that were tried but felbamate is listed as an allergy  In 2011, her AEDs were clonazepam, levetiracetam, Depakote and Lamictal  Dr Namita Mead had started [facility] in 2011  In 2012, Paul Desir was added with the reduction of clonazepam  There were difficulty with documenting seizure frequency; but seizures were no longer daily occurrences but there were clusters of seizures  There would be good periods and bad periods of seizure frequency  Dr Namita Mead had been increasing the duty cycle of the VNS over the course of 2012 to 2013  Sometime between August 2013 and October 2013, topiramate was introduced  was in status epilepticus in 2013, she was on Keppra, Banzel, Onfi, and Lamictal  She had an EEG at some point that showed multifocal spike-wave and background attenuation  She has intermittent agitation and day time somnolence  When she was hospitalized for status epilepticus, she was started on methylphenidate to help wake her up  Intake history November 2014:   VPA level 127  Her Valproic Acid dose was previously 250mg q6hrs based on Dr Jaki Irwin notes   Since July 2014, she has been receiving VPA 500mg q6hrs  She has not been hospitalized since September 2013  She was previously ambulatory with therapy  She spends most of her time sleeping for the past couple of months  She has also been receiving lactulose for elevated ammonia levels  (older drugs VPA, LVT, LMG, newer drugs added on since 2011 RFN, Onfi, TPM)     Summary of 2015: We decreased VPA from TDD 2000mg to 1000mg with increased agitation/combativeness, alternating days of exhaustion (no behavioral specialist has been involved)  are randomly reported by multiple staff members  Seizures are typically reported by CNA's or her one to one on the 3PM to 11PM shift  Seizures are described a brief events of eyes rolling back and arms going up in the air, followed by hair pulling or slapping herself  These seizures were reported as either sporadic or every other day episodes  There have been no documented grand mal seizures or drop attacks  She refuses to wear safety helmet, rips at her hair, and attempts to flip herself out of her chair and bed  Marino Gee can walk briefly but walks on her toes, she frequently will allow herself to fall down when she does not want to walk  It does not appear that methylphenidate has been useful in maintaining her level of wakefulness during the day  Marino Gee had her VNS generator change on 11/3/2015  The Model 103 (serial [id number]) was replaced with [employer] Model 105, serial [id number]  Weaned off of levetiracetam due to multiple medications and agitation; by the end of 2016, she was down to -500  Summary of 2016:  Started with RFN [id number], -250, CLB 0 5-0 5-25,  QID, -200 attempted to wean off of LVT and reduced the dose of VPA given that there were no reports of seizures and she was sedated, along with elevated ammonia levels  It was unclear as to how effective LVT was for her seizure control    When she missed a dose of TPM in September 2016, she had multiple seizures  We attempted to compensate for increase in seizures by increasing Onfi to 20-20; however, it seems that it made her more sedated  Summary of 2017  RFN [id number], -250, Onfi 20-20, -200,  q8hrs  She has been more somnolent  She continues to have tonic seizures when she is asleep, eyes rolling body, arms will tense up with some twitching, last a few seconds, but will recur  There are no seizures during the daytime  We attempted to wean off of VPA due to ammonia / somnolence; but there was an increase in tonic seizures (tonic stiffness, eyes rolling upwards, and subtle arm (right?) jerking)  She was admitted to hospital 10/10 to 10/26/2017, due to seizures, was put on continuous video EEG monitoring to determine her seizure type, seizure frequency, and rapid medication adjustments  The increased in seizure frequency was likely due to an underlying infection, but also she likely had frequent seizures during sleep which did not cause much injury to the patient  A number of medication adjustments were attempted including discontinuation of lamotrigine due to concern about it worsening seizures, reinstitution of levetiracetam, attempted discontinuation of valproic acid, attempted reduction in Onfi, and starting Fycompa  The patient's seizures were mostly based during sleep, tonic seizures  When valproic acid was discontinued there was an increased number of seizures  Valproic acid was subsequently reinstituted  Patient was discharged on reduced dose of Onfi to keep her more awake during the daytime however this instruction was not forwarded to her nursing home  There does not seem to be a change in degree of alertness or activity with these current medications  Interval history 1/22/2018   RFN [id number], Fycompa 8mg daily, LVT 2293-4220, Onfi 5-15, -250,  TID    She was admitted to hospital again in December for pneumonia, put on continuous EEG monitoring and was found to still have frequent tonic seizures while asleep  She required higher doses of VPA to control seizures and lower dose of Onfi to help with excess sedation  Banzel was also decreased  On the last day of cvEEG 2017 - there were still many brief tonic seizures nearly all out of sleep (consisting of rhythmic generalized spike slow waves and paroxysmal fast activity)  Special Features  Status epilepticus: yes  Self Injury Seizures: No  Precipitating Factors: menstrual cycle? ?     Epilepsy Risk Factors:  Abnormal pregnancy: unknown  Abnormal birth/: unknown  Abnormal Development: unknown developmental history  Febrile seizures, simple: unknown  Febrile seizures, complex: unknown  CNS infection: No  Mental retardation: Yes  Cerebral palsy: Yes  Head injury (moderate/severe): possibly anoxic brain injury  CNS neoplasm: No  CNS malformation: No  Neurosurgical procedure: No  Stroke: No  Alcohol abuse: No  Drug abuse: No  Family history Sz/epilepsy: unknown    Prior AEDs:  Rufinamide  Clobazam (excessive sedation)  Clonazepam  Levetiracetam (unclear if beneficial)  Lamotrigine (unclear if beneficial)  Topiramate (missed doses caused breakthrough seizures)  Valproate (as medication was weaned off, there were increased seizures)  Fycompa  Felbamate (listed as a drug allergy)    Prior workup:  x   Imaging:  CT head 2013  Normal CT of brain    EEGs:  EEGs:  Continuous video EEG monitoring 10/13  10/15/2017  Frequent generalized spike/polyspike-slow wave complexes during sleep  At times there are independent right more than left midtemporal spikes and bitemporal spikes    Innumerable generalized tonic seizures during sleep, generalized 1 Hz period discharges, that would become continuous for 30 seconds or generalized rhythmic alpha-beta discharges, associated with patient becoming rigid, tonic posturing with arms elevated and tense with subtle jerking of the upper body, eyes opening with upward deviation  Dr Lavon Covarrubias reported 4 ictal patterns:  1  1-3 seconds of diffuse electrodecrement then 2-7 seconds of fast activity then 2Hz spike wave discharges (no clinical manifestation)  2  same as #1, clinically accompanied by posturing of the arms, then clonic jerking  3  diffuse low fast activity without progressing to spike-wave discharges  4  2 Hz generalized discharges for 2-3 minutes with no clonical manifestations  By 10/15/2017  the tonic clonic seizures were less frequent    Continuous video EEG monitoring 10/18  10/25/2017  Diffuse background slowing with bursts of arrhythmic generalized spike wave discharges, with shifting lateralization, and independent left and  right temporal spikes  Mild eyelid myoclonia associated with brief bursts of 2-2 5 Hz generalized discharges  Tonic seizures with eelctrodecrement  There were innumerable tonic seizures; however by 10/25/2017 the frequency of these seizures did decrease in frequency  Continuous video EEG monitoring 12/1-12/5/2017  There are innumerable tonic seizures that are generally less than one minute in duration (30-40 seconds), these consists of subtle eye opening and tonic stiffening of arms, if longer then whole body stiffening with clonic jerking movements  These almost always occur exclusively out of sleep  These consist of 2-2 5 Hz generalized spike-slow wave complexes with generalized attenuation of activity (desynchronization) or paroxysmal fast activity  Per 24 hours count of seizures could be   Labs:  12/7/2017  CBC 9 3/11 8/37/184  /3 8/116/26/19/0 5/137  VPA 76  Ammonia 67    General exam   Blood pressure 90/57, pulse 85, resp  rate 16, height 5' 2" (1 575 m), weight 45 8 kg (101 lb)    Appearance: chronically ill appearing, awake, minimally interactive by resisting examination, pushing away objects  Carotids: n/a  Cardiovascular: regular rate and rhythm  Pulmonary: unable to hear  Abdominal: soft, nontender, nondistended  Extremities: no edema    HEENT: anicteric and moist mucus membranes / oral cavity   Fundoscopy: not assessed    Mental status  Orientation: nonverbal  Fund of Knowledge: nonverbal   Attention and Concentration: nonverbal  Current and Remote Memory:nonverbal  Language: nonverbal    Cranial Nerves  CN 1: not tested  CN 2: pupils equal round reactive to direct and consenual light   CN 3, 4, 6: EOMI, no nystagmus  CN 5:corneal reflex is intact symmetrically  CN 7:muscles of facial expression are symmetric and corneal reflex is intact symmetrically  CN 8:not assessed  CN 9, 10:not assessed  CN 11:not assessed  CN 12:not assessed    Motor:  Bulk, Tone: thin muscle build, relatively hypotonic tone  Pronation: not assessed  Strength: symmetric arm movements; patient minimally moves the legs    Sensory:  Lighttouch: not assessed due to cognitive impairment    Coordination:  Patient cannot walk without assistance, unable to perform tasks    Reflexes:  not assessed    Past Medical/Surgical History:  Patient Active Problem List   Diagnosis    Dysphagia    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with tonic seizures (HCC)    Lactic acidosis    Gastrostomy tube obstruction (HCC)    Excessive daytime sleepiness    Underweight    Intellectual disability    Hyperammonemia (HCC)    Acute DANIEL (middle ear effusion)    Macrocytic anemia    Hypokalemia    Hypernatremia    Osteoporosis    Onychomycosis    Lethargy    Hyperkeratosis    Elevated liver enzymes    ADHD, predominantly inattentive type    Intractable epilepsy without status epilepticus ([facility] [address] )     Past Medical History:   Diagnosis Date    ADHD     Anoxic brain damage (HCC)     Autistic disorder     Hyperammonemia (HCC)     Hyperkeratosis     Hypotension     Intellectual disability     Intellectual disability     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with tonic seizures (HCC)     Lethargy     Liver enzyme elevation     Onychomycosis     Osteoporosis     Osteoporosis     Psychiatric disorder     anxiety     Past Surgical History:   Procedure Laterality Date    CARDIAC PACEMAKER PLACEMENT      JEJUNOSTOMY FEEDING TUBE      PEG TUBE PLACEMENT         Past Psychiatric History:  Behavioral agitation    Medications:    Current Outpatient Prescriptions:     acetaminophen (TYLENOL) 325 mg tablet, 650 mg by Per G Tube route every 6 (six) hours as needed for mild pain, Disp: , Rfl:     ciprofloxacin (CILOXAN) 0 3 % ophthalmic solution, Apply to eye, Disp: , Rfl:     lactulose 20 g/30 mL, 35 g by Per G Tube route 3 (three) times a day, Disp: , Rfl:     levETIRAcetam (KEPPRA) 100 mg/mL oral solution, 10 mL (1,000 mg total) by Per G Tube route every 12 (twelve) hours for 30 days, Disp: 600 mL, Rfl: 5    levOCARNitine (CARNITOR) 1 g/10 mL solution, 5 mL (500 mg total) by Per G Tube route 3 (three) times a day, Disp: 474 mL, Rfl: 5    magnesium hydroxide (MILK OF MAGNESIA) 400 mg/5 mL oral suspension, Take 30 mL by mouth daily as needed for constipation, Disp: , Rfl:     MELATONIN PO, 6 mg by Per G Tube route daily at bedtime, Disp: , Rfl:     Multiple Vitamins-Minerals (CENTRUM SILVER PO), 1 tablet by Per G Tube route daily, Disp: , Rfl:     perampanel (FYCOMPA) oral suspension, 20 mL (10 mg total) by Per NG Tube route daily at bedtime Per G tube, Disp: 600 mL, Rfl: 3    potassium chloride 10 %, 40 mEq by Per G Tube route daily, Disp: , Rfl:     Probiotic Product (ALEXANDER-BID PROBIOTIC PO), 1 tablet by Per G Tube route daily  , Disp: , Rfl:     rufinamide (BANZEL) 400 mg tablet, 3 tablets (1,200 mg total) by Per G Tube route 2 (two) times a day, Disp: 180 tablet, Rfl: 3    Sennosides-Docusate Sodium (SENEXON-S PO), 1 tablet by Per G Tube route daily  , Disp: , Rfl:     Sodium Phosphates (FLEET ENEMA RE), Insert into the rectum as needed, Disp: , Rfl:     Valproic Acid (DEPAKENE) 250 MG/5ML soln, 10 mL (500 mg total) by Per G Tube route every 8 (eight) hours, Disp: 950 mL, Rfl: 3    White Petrolatum-Mineral Oil (SYSTANE NIGHTTIME) OINT, Apply 1 application to eye 3 (three) times a day Both eyes , Disp: , Rfl:     cloBAZam (ONFI) tablet, Take 1 tablet (10 mg total) by mouth daily at bedtime for 7 doses Then discontinue, Disp: 7 tablet, Rfl: 0    PHENobarbital 20 mg/5 mL elixir, Give 7 5mL at bedtime for 7 days, then increase to 15mL at bedtime, Disp: 473 mL, Rfl: 5    topiramate (TOPAMAX) 200 MG tablet, Give one tab with one 50mg tab twice a day, Disp: 60 tablet, Rfl: 3    topiramate (TOPAMAX) 50 MG tablet, Give one tab with one 200mg tab twice a day, Disp: 60 tablet, Rfl: 3    Allergies: Allergies   Allergen Reactions    Felbamate        Family history:  History reviewed  No pertinent family history  There is no family history of seizure, epilepsy or developmental delay  Social History  Living situation:  Resident of [facility]  Social History   Substance Use Topics    Smoking status: Never Smoker    Smokeless tobacco: Never Used    Alcohol use No        Review of Systems  A review of at least 12 organ/systems was obtained by the medical assistant and reviewed by me, including additional positives/negatives:  Neurological: Positive for seizures  Decision making was of high-complexity due to the patient's high risk condition (seizures), psychiatric and neuropsychological comorbidities, behavioral problems, memory and cognitive problems and medication side effects  The total amount of time spent with the patient was 45 minutes  More than 50% of this time was devoted to counseling and coordination of care  Issues addressed during this clinic visit included overall management, medication counseling or monitoring (including adverse effects, side effects and risks of antiepileptic medications)     Start time: 10:30AM  End time: 11:15AM

## 2018-04-23 NOTE — PATIENT INSTRUCTIONS
PLAN:  1 - Valproic acid 250mg/5mL give 10mL three times a day   2 - Banzel 400mg give 3 tabs twice a day   3 - Topiramate 50mg and 200mg tab, one each twice a day   4 - Levetiracetam 100mg/mL give 10mL twice a day   5 - Fycompa 0 5mg/mL oral solution give 20mL (10mg) at bedtime   6 - continue with Onfi 10mg at bedtime for 7 doses then discontinue  7 - start phenobarbital elixir 20mg/5mL give 7 5 mL (30mg) at bedtime for 7 days, then 15mL (60mg) at bedtime  8 - in one month check drug levels,phenobarbital, topiramate, valproic acid free, levetiracetam, CBC, CMP  9 - continue with levocarnitine 1gm/10mL give 5mL three times a day   10 - follow-up in 3 months, SOVL

## 2018-04-23 NOTE — PROGRESS NOTES
Review of Systems   Constitutional: Negative  HENT: Negative  Eyes: Negative  Respiratory: Negative  Cardiovascular: Negative  Gastrointestinal: Negative  Endocrine: Negative  Genitourinary: Negative  Musculoskeletal: Negative  Skin: Negative  Allergic/Immunologic: Negative  Neurological: Positive for seizures  Hematological: Negative  Psychiatric/Behavioral: Negative

## 2018-04-25 ENCOUNTER — TELEPHONE (OUTPATIENT)
Dept: NEUROLOGY | Facility: CLINIC | Age: 38
End: 2018-04-25

## 2018-04-25 PROBLEM — J18.9 HCAP (HEALTHCARE-ASSOCIATED PNEUMONIA): Status: RESOLVED | Noted: 2017-02-23 | Resolved: 2018-04-25

## 2018-04-25 PROBLEM — R78.81 POSITIVE BLOOD CULTURE: Status: RESOLVED | Noted: 2017-07-07 | Resolved: 2018-04-25

## 2018-04-25 NOTE — TELEPHONE ENCOUNTER
Spoke with Zeke Pablo, unit supervisor at [place] [id number], and made her aware  She verbalizes understanding   They will order the requested labs and have drawn in 1 month

## 2018-04-25 NOTE — PROGRESS NOTES
Blood work was done too early  From the office note she was to have blood work done in a month at that point she should be on a stable dose of phenobarbital   Patient will need new blood work for phenobarbital and valproic acid (would get a free level) in a month

## 2018-04-25 NOTE — TELEPHONE ENCOUNTER
----- Message from Tha Costello MD sent at 4/25/2018 12:02 PM EDT -----  Blood work was done too early  From the office note she was to have blood work done in a month at that point she should be on a stable dose of phenobarbital   Patient will need new blood work for phenobarbital and valproic acid (would get a free lev  el) in a month

## 2018-05-25 ENCOUNTER — TELEPHONE (OUTPATIENT)
Dept: NEUROLOGY | Facility: CLINIC | Age: 38
End: 2018-05-25

## 2018-05-25 NOTE — TELEPHONE ENCOUNTER
Spoke with Francis Mcardle at [facility]  She reports the patient only had the  Phenobarb and valproate total levels drawn  Francis Mcardle reports the patient has been sleeping more frequently  She has not had any breakthrough seizures since starting the phenobarb   Can have the draw the remainder of the labs- did you want anything additional?

## 2018-05-25 NOTE — TELEPHONE ENCOUNTER
----- Message from Amira Scott MD sent at 5/25/2018 11:47 AM EDT -----  Recently started the patient on phenobarbital, discontinued Onfi  Received levels for phenobarbital and Valproate total   Both are subtherapeutic, may be due to medication interactions  Please ask:  1 - did the blood work also include topiramate, valproic acid free, levetiracetam, CBC, CMP?  2 - any effects from phenobarbital? Excessive sedation? Seizure frequency?

## 2018-05-25 NOTE — TELEPHONE ENCOUNTER
No just the ones listed in my last message  I like to see the other drug levels before deciding which medication to reduce

## 2018-08-13 ENCOUNTER — APPOINTMENT (EMERGENCY)
Dept: CT IMAGING | Facility: HOSPITAL | Age: 38
DRG: 070 | End: 2018-08-13
Payer: MEDICARE

## 2018-08-13 ENCOUNTER — APPOINTMENT (EMERGENCY)
Dept: RADIOLOGY | Facility: HOSPITAL | Age: 38
DRG: 070 | End: 2018-08-13
Payer: MEDICARE

## 2018-08-13 ENCOUNTER — HOSPITAL ENCOUNTER (INPATIENT)
Facility: HOSPITAL | Age: 38
LOS: 4 days | DRG: 070 | End: 2018-08-17
Attending: EMERGENCY MEDICINE | Admitting: INTERNAL MEDICINE
Payer: MEDICARE

## 2018-08-13 DIAGNOSIS — R74.8 ELEVATED LIVER ENZYMES: ICD-10-CM

## 2018-08-13 DIAGNOSIS — J96.02 ACUTE RESPIRATORY FAILURE WITH HYPERCAPNIA (HCC): Primary | ICD-10-CM

## 2018-08-13 DIAGNOSIS — E72.20 HYPERAMMONEMIA (HCC): ICD-10-CM

## 2018-08-13 DIAGNOSIS — R74.8 ABNORMAL TRANSAMINASES: ICD-10-CM

## 2018-08-13 LAB
ALBUMIN SERPL BCP-MCNC: 2.2 G/DL (ref 3.5–5)
ALP SERPL-CCNC: 130 U/L (ref 46–116)
ALT SERPL W P-5'-P-CCNC: 210 U/L (ref 12–78)
AMMONIA PLAS-SCNC: 40 UMOL/L (ref 11–35)
ANION GAP SERPL CALCULATED.3IONS-SCNC: 5 MMOL/L (ref 4–13)
APTT PPP: 34 SECONDS (ref 24–36)
ARTERIAL PATENCY WRIST A: YES
ARTERIAL PATENCY WRIST A: YES
AST SERPL W P-5'-P-CCNC: 216 U/L (ref 5–45)
ATRIAL RATE: 75 BPM
BASE EXCESS BLDA CALC-SCNC: 5.5 MMOL/L
BASE EXCESS BLDA CALC-SCNC: 5.9 MMOL/L
BASOPHILS # BLD AUTO: 0.03 THOUSANDS/ΜL (ref 0–0.1)
BASOPHILS NFR BLD AUTO: 0 % (ref 0–1)
BILIRUB SERPL-MCNC: 0.6 MG/DL (ref 0.2–1)
BILIRUB UR QL STRIP: NEGATIVE
BUN SERPL-MCNC: 15 MG/DL (ref 5–25)
CALCIUM SERPL-MCNC: 8.6 MG/DL (ref 8.3–10.1)
CHLORIDE SERPL-SCNC: 104 MMOL/L (ref 100–108)
CLARITY UR: CLEAR
CO2 SERPL-SCNC: 32 MMOL/L (ref 21–32)
COLOR UR: YELLOW
CREAT SERPL-MCNC: 0.54 MG/DL (ref 0.6–1.3)
EOSINOPHIL # BLD AUTO: 0.22 THOUSAND/ΜL (ref 0–0.61)
EOSINOPHIL NFR BLD AUTO: 3 % (ref 0–6)
ERYTHROCYTE [DISTWIDTH] IN BLOOD BY AUTOMATED COUNT: 14.6 % (ref 11.6–15.1)
GFR SERPL CREATININE-BSD FRML MDRD: 121 ML/MIN/1.73SQ M
GLUCOSE SERPL-MCNC: 84 MG/DL (ref 65–140)
GLUCOSE SERPL-MCNC: 99 MG/DL (ref 65–140)
GLUCOSE UR STRIP-MCNC: NEGATIVE MG/DL
HCG SERPL QL: NEGATIVE
HCO3 BLDA-SCNC: 32.1 MMOL/L (ref 22–28)
HCO3 BLDA-SCNC: 32.6 MMOL/L (ref 22–28)
HCT VFR BLD AUTO: 40.7 % (ref 34.8–46.1)
HGB BLD-MCNC: 14 G/DL (ref 11.5–15.4)
HGB UR QL STRIP.AUTO: NEGATIVE
INR PPP: 1.27 (ref 0.86–1.17)
KETONES UR STRIP-MCNC: NEGATIVE MG/DL
LACTATE SERPL-SCNC: 1 MMOL/L (ref 0.5–2)
LEUKOCYTE ESTERASE UR QL STRIP: NEGATIVE
LIPASE SERPL-CCNC: 139 U/L (ref 73–393)
LYMPHOCYTES # BLD AUTO: 2.84 THOUSANDS/ΜL (ref 0.6–4.47)
LYMPHOCYTES NFR BLD AUTO: 35 % (ref 14–44)
MAGNESIUM SERPL-MCNC: 1.9 MG/DL (ref 1.6–2.6)
MCH RBC QN AUTO: 33.5 PG (ref 26.8–34.3)
MCHC RBC AUTO-ENTMCNC: 34.4 G/DL (ref 31.4–37.4)
MCV RBC AUTO: 97 FL (ref 82–98)
MONOCYTES # BLD AUTO: 0.95 THOUSAND/ΜL (ref 0.17–1.22)
MONOCYTES NFR BLD AUTO: 12 % (ref 4–12)
NEUTROPHILS # BLD AUTO: 4.08 THOUSANDS/ΜL (ref 1.85–7.62)
NEUTS SEG NFR BLD AUTO: 50 % (ref 43–75)
NITRITE UR QL STRIP: NEGATIVE
NON VENT ROOM AIR: 21 %
NON VENT ROOM AIR: ABNORMAL %
O2 CT BLDA-SCNC: 18.7 ML/DL (ref 16–23)
O2 CT BLDA-SCNC: 19 ML/DL (ref 16–23)
OXYHGB MFR BLDA: 94.2 % (ref 94–97)
OXYHGB MFR BLDA: 94.2 % (ref 94–97)
P AXIS: 84 DEGREES
PCO2 BLDA: 54.7 MM HG (ref 36–44)
PCO2 BLDA: 55.6 MM HG (ref 36–44)
PH BLDA: 7.39 [PH] (ref 7.35–7.45)
PH BLDA: 7.39 [PH] (ref 7.35–7.45)
PH UR STRIP.AUTO: 8 [PH] (ref 4.5–8)
PHENOBARB SERPL-MCNC: 21 UG/ML (ref 15–40)
PLATELET # BLD AUTO: 161 THOUSANDS/UL (ref 149–390)
PMV BLD AUTO: 12 FL (ref 8.9–12.7)
PO2 BLDA: 78.4 MM HG (ref 75–129)
PO2 BLDA: 79.6 MM HG (ref 75–129)
POTASSIUM SERPL-SCNC: 4.4 MMOL/L (ref 3.5–5.3)
PR INTERVAL: 148 MS
PROCALCITONIN SERPL-MCNC: 0.06 NG/ML
PROT SERPL-MCNC: 6.1 G/DL (ref 6.4–8.2)
PROT UR STRIP-MCNC: NEGATIVE MG/DL
PROTHROMBIN TIME: 15.3 SECONDS (ref 11.8–14.2)
QRS AXIS: 88 DEGREES
QRSD INTERVAL: 72 MS
QT INTERVAL: 364 MS
QTC INTERVAL: 406 MS
RBC # BLD AUTO: 4.18 MILLION/UL (ref 3.81–5.12)
SODIUM SERPL-SCNC: 141 MMOL/L (ref 136–145)
SP GR UR STRIP.AUTO: 1.01 (ref 1–1.03)
SPECIMEN SOURCE: ABNORMAL
SPECIMEN SOURCE: ABNORMAL
T WAVE AXIS: 68 DEGREES
TSH SERPL DL<=0.05 MIU/L-ACNC: 5.31 UIU/ML (ref 0.36–3.74)
UROBILINOGEN UR QL STRIP.AUTO: 0.2 E.U./DL
VALPROATE SERPL-MCNC: 50.1 UG/ML (ref 50–100)
VENTRICULAR RATE: 75 BPM
WBC # BLD AUTO: 8.12 THOUSAND/UL (ref 4.31–10.16)

## 2018-08-13 PROCEDURE — 80164 ASSAY DIPROPYLACETIC ACD TOT: CPT | Performed by: EMERGENCY MEDICINE

## 2018-08-13 PROCEDURE — 94760 N-INVAS EAR/PLS OXIMETRY 1: CPT

## 2018-08-13 PROCEDURE — 96361 HYDRATE IV INFUSION ADD-ON: CPT

## 2018-08-13 PROCEDURE — 82140 ASSAY OF AMMONIA: CPT | Performed by: EMERGENCY MEDICINE

## 2018-08-13 PROCEDURE — 36600 WITHDRAWAL OF ARTERIAL BLOOD: CPT

## 2018-08-13 PROCEDURE — 83735 ASSAY OF MAGNESIUM: CPT | Performed by: EMERGENCY MEDICINE

## 2018-08-13 PROCEDURE — 80053 COMPREHEN METABOLIC PANEL: CPT | Performed by: EMERGENCY MEDICINE

## 2018-08-13 PROCEDURE — 94640 AIRWAY INHALATION TREATMENT: CPT

## 2018-08-13 PROCEDURE — 94664 DEMO&/EVAL PT USE INHALER: CPT

## 2018-08-13 PROCEDURE — 87081 CULTURE SCREEN ONLY: CPT | Performed by: INTERNAL MEDICINE

## 2018-08-13 PROCEDURE — 36415 COLL VENOUS BLD VENIPUNCTURE: CPT | Performed by: EMERGENCY MEDICINE

## 2018-08-13 PROCEDURE — 85730 THROMBOPLASTIN TIME PARTIAL: CPT | Performed by: EMERGENCY MEDICINE

## 2018-08-13 PROCEDURE — 74177 CT ABD & PELVIS W/CONTRAST: CPT

## 2018-08-13 PROCEDURE — 84443 ASSAY THYROID STIM HORMONE: CPT | Performed by: INTERNAL MEDICINE

## 2018-08-13 PROCEDURE — 71260 CT THORAX DX C+: CPT

## 2018-08-13 PROCEDURE — 82805 BLOOD GASES W/O2 SATURATION: CPT | Performed by: EMERGENCY MEDICINE

## 2018-08-13 PROCEDURE — 82948 REAGENT STRIP/BLOOD GLUCOSE: CPT

## 2018-08-13 PROCEDURE — 85025 COMPLETE CBC W/AUTO DIFF WBC: CPT | Performed by: EMERGENCY MEDICINE

## 2018-08-13 PROCEDURE — 99223 1ST HOSP IP/OBS HIGH 75: CPT | Performed by: INTERNAL MEDICINE

## 2018-08-13 PROCEDURE — 84703 CHORIONIC GONADOTROPIN ASSAY: CPT | Performed by: EMERGENCY MEDICINE

## 2018-08-13 PROCEDURE — 96365 THER/PROPH/DIAG IV INF INIT: CPT

## 2018-08-13 PROCEDURE — 93010 ELECTROCARDIOGRAM REPORT: CPT | Performed by: INTERNAL MEDICINE

## 2018-08-13 PROCEDURE — 80184 ASSAY OF PHENOBARBITAL: CPT | Performed by: EMERGENCY MEDICINE

## 2018-08-13 PROCEDURE — 87449 NOS EACH ORGANISM AG IA: CPT | Performed by: INTERNAL MEDICINE

## 2018-08-13 PROCEDURE — 94660 CPAP INITIATION&MGMT: CPT

## 2018-08-13 PROCEDURE — 84145 PROCALCITONIN (PCT): CPT | Performed by: INTERNAL MEDICINE

## 2018-08-13 PROCEDURE — 93005 ELECTROCARDIOGRAM TRACING: CPT

## 2018-08-13 PROCEDURE — 99285 EMERGENCY DEPT VISIT HI MDM: CPT

## 2018-08-13 PROCEDURE — 83690 ASSAY OF LIPASE: CPT | Performed by: EMERGENCY MEDICINE

## 2018-08-13 PROCEDURE — 87040 BLOOD CULTURE FOR BACTERIA: CPT | Performed by: EMERGENCY MEDICINE

## 2018-08-13 PROCEDURE — 85610 PROTHROMBIN TIME: CPT | Performed by: EMERGENCY MEDICINE

## 2018-08-13 PROCEDURE — 82805 BLOOD GASES W/O2 SATURATION: CPT | Performed by: INTERNAL MEDICINE

## 2018-08-13 PROCEDURE — 81003 URINALYSIS AUTO W/O SCOPE: CPT | Performed by: INTERNAL MEDICINE

## 2018-08-13 PROCEDURE — 83605 ASSAY OF LACTIC ACID: CPT | Performed by: EMERGENCY MEDICINE

## 2018-08-13 RX ORDER — LACTULOSE 20 G/30ML
45 SOLUTION ORAL 3 TIMES DAILY
Status: DISCONTINUED | OUTPATIENT
Start: 2018-08-13 | End: 2018-08-17 | Stop reason: HOSPADM

## 2018-08-13 RX ORDER — VALPROIC ACID 250 MG/5ML
250 SOLUTION ORAL EVERY 6 HOURS
Status: DISCONTINUED | OUTPATIENT
Start: 2018-08-13 | End: 2018-08-17

## 2018-08-13 RX ORDER — SACCHAROMYCES BOULARDII 250 MG
250 CAPSULE ORAL 2 TIMES DAILY
Status: DISCONTINUED | OUTPATIENT
Start: 2018-08-13 | End: 2018-08-17 | Stop reason: HOSPADM

## 2018-08-13 RX ORDER — LANOLIN ALCOHOL/MO/W.PET/CERES
6 CREAM (GRAM) TOPICAL
Status: DISCONTINUED | OUTPATIENT
Start: 2018-08-13 | End: 2018-08-16

## 2018-08-13 RX ORDER — 0.9 % SODIUM CHLORIDE 0.9 %
3 VIAL (ML) INJECTION AS NEEDED
Status: DISCONTINUED | OUTPATIENT
Start: 2018-08-13 | End: 2018-08-17 | Stop reason: HOSPADM

## 2018-08-13 RX ORDER — LEVOCARNITINE 1 G/10ML
500 SOLUTION ORAL 3 TIMES DAILY
Status: DISCONTINUED | OUTPATIENT
Start: 2018-08-13 | End: 2018-08-17 | Stop reason: HOSPADM

## 2018-08-13 RX ORDER — MINERAL OIL AND PETROLATUM 150; 830 MG/G; MG/G
1 OINTMENT OPHTHALMIC 3 TIMES DAILY
Status: DISCONTINUED | OUTPATIENT
Start: 2018-08-13 | End: 2018-08-17 | Stop reason: HOSPADM

## 2018-08-13 RX ORDER — ALBUTEROL SULFATE 2.5 MG/3ML
2.5 SOLUTION RESPIRATORY (INHALATION) EVERY 6 HOURS PRN
Status: DISCONTINUED | OUTPATIENT
Start: 2018-08-13 | End: 2018-08-17 | Stop reason: HOSPADM

## 2018-08-13 RX ORDER — ONDANSETRON 4 MG/1
4 TABLET, FILM COATED ORAL EVERY 8 HOURS PRN
COMMUNITY
End: 2019-04-06

## 2018-08-13 RX ORDER — ALBUTEROL SULFATE 2.5 MG/3ML
SOLUTION RESPIRATORY (INHALATION)
Status: COMPLETED
Start: 2018-08-13 | End: 2018-08-13

## 2018-08-13 RX ORDER — ALBUTEROL SULFATE 2.5 MG/3ML
5 SOLUTION RESPIRATORY (INHALATION) ONCE
Status: COMPLETED | OUTPATIENT
Start: 2018-08-13 | End: 2018-08-13

## 2018-08-13 RX ORDER — ACETAMINOPHEN 325 MG/1
650 TABLET ORAL EVERY 6 HOURS PRN
Status: DISCONTINUED | OUTPATIENT
Start: 2018-08-13 | End: 2018-08-17 | Stop reason: HOSPADM

## 2018-08-13 RX ORDER — SODIUM CHLORIDE 9 MG/ML
125 INJECTION, SOLUTION INTRAVENOUS CONTINUOUS
Status: DISCONTINUED | OUTPATIENT
Start: 2018-08-13 | End: 2018-08-14

## 2018-08-13 RX ORDER — PHENOBARBITAL 20 MG/5ML
15.2 ELIXIR ORAL
Status: DISCONTINUED | OUTPATIENT
Start: 2018-08-13 | End: 2018-08-17

## 2018-08-13 RX ORDER — RUFINAMIDE 200 MG/1
1200 TABLET, FILM COATED ORAL 2 TIMES DAILY
Status: DISCONTINUED | OUTPATIENT
Start: 2018-08-13 | End: 2018-08-17 | Stop reason: HOSPADM

## 2018-08-13 RX ORDER — POTASSIUM CHLORIDE 20MEQ/15ML
20 LIQUID (ML) ORAL DAILY
Status: DISCONTINUED | OUTPATIENT
Start: 2018-08-13 | End: 2018-08-17 | Stop reason: HOSPADM

## 2018-08-13 RX ORDER — LEVETIRACETAM 100 MG/ML
1000 SOLUTION ORAL EVERY 12 HOURS SCHEDULED
Status: DISCONTINUED | OUTPATIENT
Start: 2018-08-13 | End: 2018-08-17 | Stop reason: HOSPADM

## 2018-08-13 RX ADMIN — LEVETIRACETAM 1000 MG: 100 SOLUTION ORAL at 15:00

## 2018-08-13 RX ADMIN — MELATONIN TAB 3 MG 6 MG: 3 TAB at 22:13

## 2018-08-13 RX ADMIN — LEVOCARNITINE 500 MG: 1 SOLUTION ORAL at 18:47

## 2018-08-13 RX ADMIN — ENOXAPARIN SODIUM 40 MG: 40 INJECTION, SOLUTION INTRAVENOUS; SUBCUTANEOUS at 13:18

## 2018-08-13 RX ADMIN — SODIUM CHLORIDE 125 ML/HR: 0.9 INJECTION, SOLUTION INTRAVENOUS at 13:21

## 2018-08-13 RX ADMIN — ALBUTEROL SULFATE 2.5 MG: 2.5 SOLUTION RESPIRATORY (INHALATION) at 05:01

## 2018-08-13 RX ADMIN — LEVETIRACETAM 1000 MG: 100 SOLUTION ORAL at 22:13

## 2018-08-13 RX ADMIN — ALBUTEROL SULFATE 2.5 MG: 2.5 SOLUTION RESPIRATORY (INHALATION) at 05:41

## 2018-08-13 RX ADMIN — SODIUM CHLORIDE 1000 ML: 0.9 INJECTION, SOLUTION INTRAVENOUS at 04:56

## 2018-08-13 RX ADMIN — PIPERACILLIN SODIUM,TAZOBACTAM SODIUM 3.38 G: 3; .375 INJECTION, POWDER, FOR SOLUTION INTRAVENOUS at 14:50

## 2018-08-13 RX ADMIN — Medication 250 MG: at 17:26

## 2018-08-13 RX ADMIN — POTASSIUM CHLORIDE 20 MEQ: 20 SOLUTION ORAL at 14:52

## 2018-08-13 RX ADMIN — RUFINAMIDE 1200 MG: 200 TABLET, FILM COATED ORAL at 18:47

## 2018-08-13 RX ADMIN — LACTULOSE 45 G: 10 SOLUTION ORAL at 22:12

## 2018-08-13 RX ADMIN — VALPROIC ACID 250 MG: 500 SOLUTION ORAL at 19:58

## 2018-08-13 RX ADMIN — IOHEXOL 75 ML: 350 INJECTION, SOLUTION INTRAVENOUS at 06:43

## 2018-08-13 RX ADMIN — PIPERACILLIN SODIUM,TAZOBACTAM SODIUM 3.38 G: 3; .375 INJECTION, POWDER, FOR SOLUTION INTRAVENOUS at 19:52

## 2018-08-13 RX ADMIN — Medication 4.5 G: at 04:30

## 2018-08-13 RX ADMIN — TOPIRAMATE 250 MG: 100 TABLET, FILM COATED ORAL at 19:54

## 2018-08-13 RX ADMIN — IPRATROPIUM BROMIDE 0.5 MG: 0.5 SOLUTION RESPIRATORY (INHALATION) at 05:01

## 2018-08-13 RX ADMIN — PHENOBARBITAL 15.2 MG: 20 LIQUID ORAL at 22:13

## 2018-08-13 RX ADMIN — LACTULOSE 45 G: 10 SOLUTION ORAL at 17:26

## 2018-08-13 RX ADMIN — TOPIRAMATE 250 MG: 100 TABLET, FILM COATED ORAL at 15:00

## 2018-08-13 RX ADMIN — WHITE PETROLATUM MINERAL OIL 1 APPLICATION: 150; 830 OINTMENT OPHTHALMIC at 17:35

## 2018-08-13 RX ADMIN — WHITE PETROLATUM MINERAL OIL 1 APPLICATION: 150; 830 OINTMENT OPHTHALMIC at 22:13

## 2018-08-13 RX ADMIN — SODIUM CHLORIDE 1000 ML: 0.9 INJECTION, SOLUTION INTRAVENOUS at 05:40

## 2018-08-13 RX ADMIN — SODIUM CHLORIDE 125 ML/HR: 0.9 INJECTION, SOLUTION INTRAVENOUS at 19:47

## 2018-08-13 RX ADMIN — VALPROIC ACID 250 MG: 500 SOLUTION ORAL at 14:52

## 2018-08-13 NOTE — SOCIAL WORK
Spoke with Dennie Charters in admissions department at [facility] who stated pt is a 15 day MA bedhold and able to return on dc  CM will continue to follow

## 2018-08-13 NOTE — H&P
H&P- Reggie Hernandez LVJVLD 1980, 41 y o  female MRN: [id number]    Unit/Bed#: 406-01 Encounter: [id number]    Primary Care Provider: Aida Cabrera DO   Date and time admitted to hospital: 8/13/2018  4:00 AM        Acute encephalopathy   Assessment & Plan     Patient with altered mental status compared to baseline per her nursing facility, possible underlying pneumonia, follow-up cultures, continue with IV Zosyn  Acute respiratory failure with hypercapnia (HCC)   Assessment & Plan     Continuous pulse ox monitor, will repeat ABG in a m  Hyperammonemia (HCC)   Assessment & Plan      Continue lactulose        Intractable epilepsy without status epilepticus [facility])   Assessment & Plan     Continue outpatient anti epileptic drug regimen        Elevated liver enzymes   Assessment & Plan     Follow up daily labs, patient has had elevated LFTs in the past        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with tonic seizures [facility])   Assessment & Plan     Continue medical management as noted above        Underweight   Assessment & Plan     Will plan on resuming tube feeds tomorrow  Dysphagia   Assessment & Plan     NPO today, will resume tube feeds tomorrow            History and Physical - [facility]    Patient Information: Yamel Woodson 41 y o  female MRN: [id number]  Unit/Bed#: 406-01 Encounter: [id number]  Admitting Physician: Brenda Simpson DO  PCP: Aida Cabrera DO  Date of Admission:  08/13/18    Assessment/Plan:    Hospital Problem List:     Active Problems:    Acute encephalopathy    Hyperammonemia (HCC)    Acute respiratory failure with hypercapnia (HCC)    Elevated liver enzymes    Intractable epilepsy without status epilepticus ([facility] [address] )    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with tonic seizures ([facility] [address] )    Dysphagia    Underweight      VTE Prophylaxis: Enoxaparin (Lovenox)  / sequential compression device   Code Status:   Full code  POLST: There is no POLST form on file for this patient (pre-hospital)    Anticipated Length of Stay:  Patient will be admitted on an Inpatient basis with an anticipated length of stay of   Greater than 2 midnights  Justification for Hospital Stay:  Further evaluation patient's acute encephalopathy    Total Time for Visit, including Counseling / Coordination of Care: 90 min  Greater than 50% of this total time spent on direct patient counseling and coordination of care  Chief Complaint:    Altered mental status    History of Present Illness:    Fatmata Caba is a 41 y o  female who presents with  Altered mental status from nursing facility  Patient cannot provide any history, only history available chart review       "     Altered Mental Status       Per [facility], pt has been having fever and vomiting and exhibiting increasing lethargy for greater than 24hours - had increased WBC and ammonia levels on bloodwork drawn 08-12-18      [facility] called ahead to say that Edy is more lethargic  Usually she resists care but now today she does not  She had blood work done - see attached  History provided by:  Nursing home (Caregiver also at bedside)  History limited by:  Patient nonverbal  Altered Mental Status   Presenting symptoms: behavior changes and lethargy    Severity:  Severe  Most recent episode: Today  Episode history:  Continuous  Duration:  1 day  Timing:  Constant  Progression:  Unchanged  Context: nursing home resident    Associated symptoms: fever (2 days ago)  "     this hx  per Dr Melany Carr of the emergency room           Review of Systems:    Review of Systems   Unable to perform ROS: Patient nonverbal       Past Medical and Surgical History:     Past Medical History:   Diagnosis Date    ADHD     Anoxic brain damage (HCC)     Autistic disorder     Hyperammonemia (HCC)     Hyperkeratosis     Hypotension     Intellectual disability     Intellectual disability     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with tonic seizures (HCC)     Lethargy     Liver enzyme elevation  Onychomycosis     Osteoporosis     Osteoporosis     Psychiatric disorder     anxiety       Past Surgical History:   Procedure Laterality Date    ABDOMINAL SURGERY      CARDIAC PACEMAKER PLACEMENT      JEJUNOSTOMY FEEDING TUBE      PEG TUBE PLACEMENT         Meds/Allergies:    Prior to Admission medications    Medication Sig Start Date End Date Taking?  Authorizing Provider   acetaminophen (TYLENOL) 325 mg tablet 650 mg by Per G Tube route every 6 (six) hours as needed for mild pain   Yes Historical Provider, MD   bisacodyl (FLEET) 10 MG/30ML ENEM Insert 10 mg into the rectum as needed for constipation   Yes Historical Provider, MD   lactulose 20 g/30 mL 45 g by Per G Tube route 3 (three) times a day     Yes Historical Provider, MD   levETIRAcetam (KEPPRA) 100 mg/mL oral solution 10 mL (1,000 mg total) by Per G Tube route every 12 (twelve) hours for 30 days 4/23/18 8/13/18 Yes Rainer Bartlett MD   levOCARNitine (CARNITOR) 1 g/10 mL solution 5 mL (500 mg total) by Per G Tube route 3 (three) times a day 4/23/18  Yes Rainer Bartlett MD   MELATONIN PO 6 mg by Per G Tube route daily at bedtime   Yes Historical Provider, MD   Multiple Vitamins-Minerals (CENTRUM SILVER PO) 1 tablet by Per G Tube route daily   Yes Historical Provider, MD   perampanel [facility]) oral suspension 20 mL (10 mg total) by Per NG Tube route daily at bedtime Per G tube 4/23/18  Yes Rainer Bartlett MD   PHENobarbital 20 mg/5 mL elixir Give 7 5mL at bedtime for 7 days, then increase to 15mL at bedtime  Patient taking differently: 15 mg by Per G Tube route daily at bedtime Give 7 5mL at bedtime for 7 days, then increase to 15mL at bedtime  4/23/18  Yes Rainer Bartlett MD   potassium chloride 10 % 20 mEq by Per G Tube route daily     Yes Historical Provider, MD   Probiotic Product (ALEXANDER-BID PROBIOTIC PO) 1 tablet by Per G Tube route daily     Yes Historical Provider, MD   rufinamide (BANZEL) 400 mg tablet 3 tablets (1,200 mg total) by Per G Tube route 2 (two) times a day 4/23/18  Yes James Aiken MD   Sennosides-Docusate Sodium (SENEXON-S PO) 1 tablet by Per G Tube route daily     Yes Historical Provider, MD   topiramate (TOPAMAX) 200 MG tablet Give one tab with one 50mg tab twice a day  Patient taking differently: 250 mg by Per G Tube route 2 (two) times a day Give one tab with one 50mg tab twice a day  4/23/18  Yes James Aiken MD   topiramate (TOPAMAX) 50 MG tablet Give one tab with one 200mg tab twice a day 4/23/18  Yes James Aiken MD   Valproic Acid (DEPAKENE) 250 MG/5ML soln 10 mL (500 mg total) by Per G Tube route every 8 (eight) hours  Patient taking differently: 250 mg by Per G Tube route every 6 (six) hours   4/23/18  Yes James Aiken MD   White Petrolatum-Mineral Oil (SYSTANE NIGHTTIME) OINT Apply 1 application to eye 3 (three) times a day Both eyes    Yes Historical Provider, MD   cloBAZam (ONFI) tablet Take 1 tablet (10 mg total) by mouth daily at bedtime for 7 doses Then discontinue 4/23/18 4/30/18  James Aiken MD   magnesium hydroxide (MILK OF MAGNESIA) 400 mg/5 mL oral suspension Take 30 mL by mouth daily as needed for constipation    Historical Provider, MD   ondansetron (ZOFRAN) 4 mg tablet 4 mg by Per G Tube route every 8 (eight) hours as needed for nausea or vomiting      Historical Provider, MD   Sodium Phosphates (FLEET ENEMA RE) Insert into the rectum as needed    Historical Provider, MD   ciprofloxacin (CILOXAN) 0 3 % ophthalmic solution Apply to eye  8/13/18  Historical Provider, MD     I have reviewed home medications with patient personally  Allergies:    Allergies   Allergen Reactions    Felbamate        Social History:     Marital Status: Single   Substance Use History:   History   Alcohol Use No     History   Smoking Status    Never Smoker   Smokeless Tobacco    Never Used     History   Drug Use No       Family History:    non-contributory    Physical Exam:     Vitals:   Blood Pressure: 95/63 (08/13/18 1537)  Pulse: 77 (08/13/18 1537)  Temperature: 97 5 °F (36 4 °C) (08/13/18 1537)  Temp Source: Temporal (08/13/18 1537)  Respirations: 19 (08/13/18 1537)  Height: 4' 9" (144 8 cm) (08/13/18 1045)  Weight - Scale: 44 8 kg (98 lb 12 3 oz) (08/13/18 1045)  SpO2: 96 % (08/13/18 1537)    Physical Exam   Constitutional:   Cachectic chronically ill-appearing female   Cardiovascular: Normal rate  Pulmonary/Chest:   Rhonchi noted bilaterally   Abdominal: Soft  Bowel sounds are normal  She exhibits no distension  There is no tenderness  Musculoskeletal: She exhibits no edema  Neurological: She is alert  Decreased muscle tone, patient does not follow commands, she is not moving any of her extremities during my exam   Skin: Skin is warm and dry  No rash noted  No erythema  Psychiatric: She has a normal mood and affect  Her behavior is normal  Judgment and thought content normal    Nursing note and vitals reviewed  Additional Data:     Lab Results: I have personally reviewed pertinent reports  Results from last 7 days  Lab Units 08/13/18  0424   WBC Thousand/uL 8 12   HEMOGLOBIN g/dL 14 0   HEMATOCRIT % 40 7   PLATELETS Thousands/uL 161   NEUTROS PCT % 50   LYMPHS PCT % 35   MONOS PCT % 12   EOS PCT % 3       Results from last 7 days  Lab Units 08/13/18  0424   SODIUM mmol/L 141   POTASSIUM mmol/L 4 4   CHLORIDE mmol/L 104   CO2 mmol/L 32   BUN mg/dL 15   CREATININE mg/dL 0 54*   CALCIUM mg/dL 8 6   TOTAL PROTEIN g/dL 6 1*   BILIRUBIN TOTAL mg/dL 0 60   ALK PHOS U/L 130*   ALT U/L 210*   AST U/L 216*   GLUCOSE RANDOM mg/dL 84       Results from last 7 days  Lab Units 08/13/18  0424   INR  1 27*       Imaging: I have personally reviewed pertinent reports  Ct Chest Abdomen Pelvis W Contrast    Result Date: 8/13/2018  Narrative: CT CHEST, ABDOMEN AND PELVIS WITH IV CONTRAST INDICATION:   sob, abnl LFT, septic  COMPARISON:  CT chest, abdomen and pelvis 7/6/2017   TECHNIQUE: CT examination of the chest, abdomen and pelvis was performed  Axial, sagittal, and coronal 2D reformatted images were created from the source data and submitted for interpretation  Radiation dose length product (DLP) for this visit:  265 57 mGy-cm   This examination, like all CT scans performed in the [facility], was performed utilizing techniques to minimize radiation dose exposure, including the use of iterative  reconstruction and automated exposure control  IV Contrast:  75 mL of iohexol (OMNIPAQUE) Enteric Contrast: Enteric contrast was not administered  FINDINGS: CHEST LUNGS: Mild patchy bibasilar opacities, likely atelectasis  There is no tracheal or endobronchial lesion  PLEURA:  Unremarkable  HEART/GREAT VESSELS:  Unremarkable for patient's age  MEDIASTINUM AND [place]:  Unremarkable  CHEST WALL AND LOWER NECK:   Stimulator battery supply embedded in the left chest wall with leads extending to the left neck  ABDOMEN LIVER/BILIARY TREE:  Unremarkable  GALLBLADDER:  No calcified gallstones  No pericholecystic inflammatory change  SPLEEN:  Unremarkable  PANCREAS:  Unremarkable  ADRENAL GLANDS:  Unremarkable  KIDNEYS/URETERS:  No hydronephrosis  Contrast opacification of portions of the renal collecting systems  STOMACH AND BOWEL:  Percutaneous gastrostomy tube in the stomach  Suggestion of diverticulum with air-fluid level in the proximal duodenum seen on images 54-59 of series 2  Nondilated, fluid-filled distal large bowel loops, nonspecific  No findings of  small or large bowel obstruction  APPENDIX:  No findings to suggest appendicitis  ABDOMINOPELVIC CAVITY:  No ascites or free intraperitoneal air  No lymphadenopathy  VESSELS:  Unremarkable for patient's age  PELVIS REPRODUCTIVE ORGANS:  Unremarkable for patient's age  URINARY BLADDER:  Layering contrast in the urinary bladder  ABDOMINAL WALL/INGUINAL REGIONS:  Percutaneous gastrostomy tube enters the left anterior abdominal wall   OSSEOUS STRUCTURES:  No acute fracture or destructive osseous lesion  Mild lumbar levoscoliosis with multilevel degenerative changes noted  Impression: No acute CT finding in the chest, abdomen and pelvis  Workstation performed: [id number]       Allscripts / Epic Records Reviewed: Yes     ** Please Note: This note has been constructed using a voice recognition system   **

## 2018-08-13 NOTE — ED PROVIDER NOTES
History  Chief Complaint   Patient presents with    Altered Mental Status     Per [facility], pt has been having fever and vomiting and exhibiting increasing lethargy for greater than 24hours - had increased WBC and ammonia levels on bloodwork drawn 18     Patient: Alyse Solo [id number]  29 y o /female  YOB: 1980  MRN: [id number]  PCP: Nik Braga DO  Date of evaluation: 2018    (N B   [address] may have been used in the preparation of this document  Occasional wrong word or "sound-alike" substitutions may have occurred due to the inherent limitations of voice recognition software  Interpretation should be guided by context )    [facility] called ahead to say that Nelson Mckenzie is more lethargic  Usually she resists care but now today she does not  She had blood work done - see attached  History provided by:  Nursing home (Caregiver also at bedside)  History limited by:  Patient nonverbal  Altered Mental Status   Presenting symptoms: behavior changes and lethargy    Severity:  Severe  Most recent episode: Today  Episode history:  Continuous  Duration:  1 day  Timing:  Constant  Progression:  Unchanged  Context: nursing home resident    Associated symptoms: fever (2 days ago)        Prior to Admission Medications   Prescriptions Last Dose Informant Patient Reported? Taking?    MELATONIN PO 2018 at 2100  Yes Yes   Si mg by Per G Tube route daily at bedtime   Multiple Vitamins-Minerals (CENTRUM SILVER PO) 2018 at 0900  Yes Yes   Si tablet by Per G Tube route daily   PHENobarbital 20 mg/5 mL elixir 2018 at 2100  No Yes   Sig: Give 7 5mL at bedtime for 7 days, then increase to 15mL at bedtime   Probiotic Product (ALEXANDER-BID PROBIOTIC PO) 2018 at 0900  Yes Yes   Si tablet by Per G Tube route daily     Sennosides-Docusate Sodium (SENEXON-S PO) 2018 at 0900  Yes Yes   Si tablet by Per G Tube route daily     Sodium Phosphates (FLEET ENEMA RE) Unknown at Unknown time  Yes No   Sig: Insert into the rectum as needed   Valproic Acid (DEPAKENE) 250 MG/5ML soln 2018 at 1800  No Yes   Sig: 10 mL (500 mg total) by Per G Tube route every 8 (eight) hours   White Petrolatum-Mineral Oil (SYSTANE NIGHTTIME) OINT 2018 at 2200  Yes Yes   Sig: Apply 1 application to eye 3 (three) times a day Both eyes    acetaminophen (TYLENOL) 325 mg tablet 2018 at 2359  Yes Yes   Si mg by Per G Tube route every 6 (six) hours as needed for mild pain   cloBAZam (ONFI) tablet   No No   Sig: Take 1 tablet (10 mg total) by mouth daily at bedtime for 7 doses Then discontinue   lactulose 20 g/30 mL 2018 at 1700  Yes Yes   Si g by Per G Tube route 3 (three) times a day   levETIRAcetam (KEPPRA) 100 mg/mL oral solution 2018 at 2100  No Yes   Sig: 10 mL (1,000 mg total) by Per G Tube route every 12 (twelve) hours for 30 days   levOCARNitine (CARNITOR) 1 g/10 mL solution 2018 at 2200  No Yes   Si mL (500 mg total) by Per G Tube route 3 (three) times a day   magnesium hydroxide (MILK OF MAGNESIA) 400 mg/5 mL oral suspension Unknown at Unknown time  Yes No   Sig: Take 30 mL by mouth daily as needed for constipation   ondansetron (ZOFRAN) 4 mg tablet Unknown at Unknown time  Yes No   Sig: Take 4 mg by mouth every 8 (eight) hours as needed for nausea or vomiting   perampanel (FYCOMPA) oral suspension 2018 at 2100  No Yes   Si mL (10 mg total) by Per NG Tube route daily at bedtime Per G tube   potassium chloride 10 % 2018 at 0900  Yes Yes   Si mEq by Per G Tube route daily   rufinamide (BANZEL) 400 mg tablet 2018 at 2100  No Yes   Sig: 3 tablets (1,200 mg total) by Per G Tube route 2 (two) times a day   topiramate (TOPAMAX) 200 MG tablet 2018 at 2100  No Yes   Sig: Give one tab with one 50mg tab twice a day   topiramate (TOPAMAX) 50 MG tablet 2018 at 2100  No Yes   Sig: Give one tab with one 200mg tab twice a day Facility-Administered Medications: None       Past Medical History:   Diagnosis Date    ADHD     Anoxic brain damage (HCC)     Autistic disorder     Hyperammonemia (HCC)     Hyperkeratosis     Hypotension     Intellectual disability     Intellectual disability     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with tonic seizures (HCC)     Lethargy     Liver enzyme elevation     Onychomycosis     Osteoporosis     Osteoporosis     Psychiatric disorder     anxiety       Past Surgical History:   Procedure Laterality Date    CARDIAC PACEMAKER PLACEMENT      JEJUNOSTOMY FEEDING TUBE      PEG TUBE PLACEMENT         History reviewed  No pertinent family history  I have reviewed and agree with the history as documented  Social History   Substance Use Topics    Smoking status: Never Smoker    Smokeless tobacco: Never Used    Alcohol use No        Review of Systems   Unable to perform ROS: Patient nonverbal   Constitutional: Positive for fever (2 days ago)  Physical Exam  Physical Exam   Constitutional: She is oriented to person, place, and time  She appears well-developed and well-nourished  She appears listless  She appears cachectic  She appears ill  HENT:   Head: Normocephalic and atraumatic  Mouth/Throat: Oropharynx is clear and moist and mucous membranes are normal    Voice normal   Eyes: EOM are normal  Pupils are equal, round, and reactive to light  Cardiovascular: Normal rate and regular rhythm  Pulmonary/Chest: Effort normal  She has rales  Abdominal: Soft  Bowel sounds are normal  There is no tenderness  PEG   Neurological: She is oriented to person, place, and time  She appears listless  GCS eye subscore is 4  GCS verbal subscore is 5  GCS motor subscore is 6  Contracted  Moves onto her side spontaneously   Skin: Skin is warm and dry  Psychiatric: She has a normal mood and affect   Her speech is normal and behavior is normal    Unable to assess   Nursing note and vitals reviewed  Vital Signs  ED Triage Vitals [08/13/18 0406]   Temperature Pulse Respirations Blood Pressure SpO2   97 8 °F (36 6 °C) 64 (!) 24 (!) 87/57 99 %      Temp Source Heart Rate Source Patient Position - Orthostatic VS BP Location FiO2 (%)   Temporal Monitor Lying Left arm --      Pain Score       --           Vitals:    08/13/18 0406 08/13/18 0615 08/13/18 0630 08/13/18 0700   BP: (!) 87/57  95/61 98/64   Pulse: 64 79 79 78   Patient Position - Orthostatic VS: Lying          Visual Acuity      ED Medications  Medications   sodium chloride (PF) 0 9 % injection 3 mL (not administered)   sodium chloride 0 9 % bolus 1,000 mL (0 mL Intravenous Stopped 8/13/18 0526)     Followed by   sodium chloride 0 9 % bolus 1,000 mL (1,000 mL Intravenous New Bag 8/13/18 0540)   piperacillin-tazobactam (ZOSYN) 4 5 g in sodium chloride 0 9 % 100 mL IVPB (0 g Intravenous Stopped 8/13/18 0500)   albuterol inhalation solution 5 mg (2 5 mg Nebulization Given 8/13/18 0541)   ipratropium (ATROVENT) 0 02 % inhalation solution 0 5 mg (0 5 mg Nebulization Given 8/13/18 0501)   iohexol (OMNIPAQUE) 350 MG/ML injection (MULTI-DOSE) 75 mL (75 mL Intravenous Given 8/13/18 0643)       Diagnostic Studies  Results Reviewed     Procedure Component Value Units Date/Time    hCG, qualitative pregnancy [[id number]]  (Normal) Collected:  08/13/18 0424    Lab Status:  Final result Specimen:  Blood from Arm, Left Updated:  08/13/18 0539     Preg, Serum Negative    Valproic acid level, total [[id number]]  (Normal) Collected:  08/13/18 0424    Lab Status:  Final result Specimen:  Blood from Arm, Left Updated:  08/13/18 0539     Valproic Acid, Total 50 1 ug/mL     Lactic acid x2 [[id number]]  (Normal) Collected:  08/13/18 0424    Lab Status:  Final result Specimen:  Blood from Arm, Left Updated:  08/13/18 0457     LACTIC ACID 1 0 mmol/L     Narrative:         Result may be elevated if tourniquet was used during collection      Phenobarbital level [[id number]] Collected:  08/13/18 0424    Lab Status: In process Specimen:  Blood from Arm, Left Updated:  08/13/18 0457    Lipase [[id number]]  (Normal) Collected:  08/13/18 0424    Lab Status:  Final result Specimen:  Blood from Arm, Left Updated:  08/13/18 0456     Lipase 139 u/L     Comprehensive metabolic panel [[id number]]  (Abnormal) Collected:  08/13/18 0424    Lab Status:  Final result Specimen:  Blood from Arm, Left Updated:  08/13/18 0456     Sodium 141 mmol/L      Potassium 4 4 mmol/L      Chloride 104 mmol/L      CO2 32 mmol/L      Anion Gap 5 mmol/L      BUN 15 mg/dL      Creatinine 0 54 (L) mg/dL      Glucose 84 mg/dL      Calcium 8 6 mg/dL       (H) U/L       (H) U/L      Alkaline Phosphatase 130 (H) U/L      Total Protein 6 1 (L) g/dL      Albumin 2 2 (L) g/dL      Total Bilirubin 0 60 mg/dL      eGFR 121 ml/min/1 73sq m     Narrative:         National Kidney Disease Education Program recommendations are as follows:  GFR calculation is accurate only with a steady state creatinine  Chronic Kidney disease less than 60 ml/min/1 73 sq  meters  Kidney failure less than 15 ml/min/1 73 sq  meters      Magnesium [[id number]]  (Normal) Collected:  08/13/18 0424    Lab Status:  Final result Specimen:  Blood from Arm, Left Updated:  08/13/18 0456     Magnesium 1 9 mg/dL     Protime-INR [[id number]]  (Abnormal) Collected:  08/13/18 0424    Lab Status:  Final result Specimen:  Blood from Arm, Left Updated:  08/13/18 0455     Protime 15 3 (H) seconds      INR 1 27 (H)    APTT [[id number]]  (Normal) Collected:  08/13/18 0424    Lab Status:  Final result Specimen:  Blood from Arm, Left Updated:  08/13/18 0455     PTT 34 seconds     Ammonia [[id number]]  (Abnormal) Collected:  08/13/18 0424    Lab Status:  Final result Specimen:  Blood from Arm, Left Updated:  08/13/18 0451     Ammonia 40 (H) umol/L     Blood gas, arterial [[id number]]  (Abnormal) Collected:  08/13/18 0434    Lab Status:  Final result Specimen:  Blood, Arterial from Radial, Right Updated:  08/13/18 0439     pH, Arterial 7 386     pCO2, Arterial 54 7 (H) mm Hg      pO2, Arterial 79 6 mm Hg      HCO3, Arterial 32 1 (H) mmol/L      Base Excess, Arterial 5 5 mmol/L      O2 Content, Arterial 18 7 mL/dL      O2 HGB,Arterial  94 2 %      SOURCE Radial, Right     REZA TEST Yes     ROOM AIR FIO2 21 %     CBC and differential [[id number]]  (Normal) Collected:  08/13/18 0424    Lab Status:  Final result Specimen:  Blood from Arm, Left Updated:  08/13/18 0434     WBC 8 12 Thousand/uL      RBC 4 18 Million/uL      Hemoglobin 14 0 g/dL      Hematocrit 40 7 %      MCV 97 fL      MCH 33 5 pg      MCHC 34 4 g/dL      RDW 14 6 %      MPV 12 0 fL      Platelets 160 Thousands/uL      Neutrophils Relative 50 %      Lymphocytes Relative 35 %      Monocytes Relative 12 %      Eosinophils Relative 3 %      Basophils Relative 0 %      Neutrophils Absolute 4 08 Thousands/µL      Lymphocytes Absolute 2 84 Thousands/µL      Monocytes Absolute 0 95 Thousand/µL      Eosinophils Absolute 0 22 Thousand/µL      Basophils Absolute 0 03 Thousands/µL     Blood culture #1 [[id number]] Collected:  08/13/18 0424    Lab Status: In process Specimen:  Blood from Hand, Left Updated:  08/13/18 0431    Blood culture #2 [[id number]] Collected:  08/13/18 0424    Lab Status:   In process Specimen:  Blood from Arm, Left Updated:  08/13/18 0431    Fingerstick Glucose (POCT) [[id number]]  (Normal) Collected:  08/13/18 0415    Lab Status:  Final result Updated:  08/13/18 0415     POC Glucose 99 mg/dl     UA w Reflex to Microscopic w Reflex to Culture [[id number]]     Lab Status:  No result Specimen:  Urine     Lactic acid x2 [[id number]]     Lab Status:  No result Specimen:  Blood                  CT chest abdomen pelvis w contrast    (Results Pending)              Procedures  Procedures       Phone Contacts  ED Phone Contact    ED Course  ED Course as of Aug 13 0748   Mon Aug 13, 2018   0414 Respirations: (!) 24   0417 Blood Pressure: (!) 87/57   [id number] Meeting SIRS criteria with suspected focus of infection  0444 pH, Arterial: 7 386   0444 New  pCO2, Arterial: (!) 54 7   0444 pO2, Arterial: 79 6   0500 Lipase: 139   0502 AST: (!) 216   0502 ALT: (!) 210   0502 Baseline Alkaline Phosphatase: (!) 130   0503 Ammonia: (!) 40   0504 RT [place] declines to take pt over for CT without a negative pregnancy test     0709 More alert on Vapotherm  0735 BP improved with fluids  0741 Again decreased responsiveness, and caregiver at bedside agrees  Initial Sepsis Screening     Row Name 08/13/18 0417                Is the patient's history suggestive of a new or worsening infection? (!)  Yes (Proceed)  -DA        Suspected source of infection pneumonia  -DA        Are two or more of the following signs & symptoms of infection both present and new to the patient? (!)  Yes (Proceed)  -DA        Indicate SIRS criteria Tachypnea > 20 resp per min; Altered mental status  -DA        If the answer is yes to both questions, suspicion of sepsis is present          If severe sepsis is present AND tissue hypoperfusion perists in the hour after fluid resuscitation or lactate > 4, the patient meets criteria for SEPTIC SHOCK          Are any of the following organ dysfunction criteria present within 6 hours of suspected infection and SIRS criteria that are NOT considered to be chronic conditions?         Organ dysfunction          Date of presentation of severe sepsis          Time of presentation of severe sepsis          Tissue hypoperfusion persists in the hour after crystalloid fluid administration, evidenced, by either:          Was hypotension present within one hour of the conclusion of crystalloid fluid administration?           Date of presentation of septic shock          Time of presentation of septic shock            User Key  (r) = Recorded By, (t) = Taken By, (c) = Cosigned By    Initials Name Provider Type Lula Mahoney MD Physician           Default Flowsheet Data (last 720 hours)      Sepsis Reassess     Row Name 08/13/18 0708                   Repeat Volume Status and Tissue Perfusion Assessment Performed    Repeat Volume Status and Tissue Perfusion Assessment Performed Yes  -DA           Volume Status and Tissue Perfusion Post Fluid Resuscitation- Must Document 5 of the Following 7:    Vital Signs Reviewed (HR, RR, BP, T) Yes  -DA        Arterial Oxygen Saturation Reviewed (POx, SaO2 or SpO2)          Cardio Normal S1/S2  -DA        Pulmonary Normal effort;Clear to auscultation  -DA        Capillary Refill Brisk  -DA        Peripheral Pulses          Skin Warm;Dry  -DA        Urine output assessed             *OR*   Intensive Monitoring- Must Document One of the Following Four *:    Vital Signs Reviewed          * Central Venous Pressure (CVP or RAP)          * Central Venous Oxygen (SVO2, ScvO2 or Oxygen saturation via central catheter)          * Bedside Cardiovascular US in IVC diameter and % collapse          * Passive Leg Raise OR Crystalloid Challenge            User Key  (r) = Recorded By, (t) = Taken By, (c) = Cosigned By    [address] Name Provider Type    DA Berenice Arauz MD Physician                MDM  Number of Diagnoses or Management Options     Amount and/or Complexity of Data Reviewed  Tests in the radiology section of CPT®: ordered and reviewed  Tests in the medicine section of CPT®: ordered and reviewed  Decide to obtain previous medical records or to obtain history from someone other than the patient: yes (caregiver at bedside)  Independent visualization of images, tracings, or specimens: yes      CritCare Time    Disposition  Final diagnoses:   None     ED Disposition     None      Follow-up Information    None         Patient's Medications   Discharge Prescriptions    No medications on file     No discharge procedures on file      ED Provider  Electronically Signed by Les Lesch, MD  08/15/18 6501

## 2018-08-13 NOTE — ED PROCEDURE NOTE
PROCEDURE  ECG 12 Lead Documentation  Date/Time: 8/13/2018 7:43 AM  Performed by: Gina Allen by: Marsha Junior     Indications / Diagnosis:  Altered mental status  ECG reviewed by me, the ED Provider: yes    Patient location:  ED  Previous ECG:     Comparison to cardiac monitor: Yes    Interpretation:     Interpretation: normal    Rate:     ECG rate:  75    ECG rate assessment: normal    Rhythm:     Rhythm: sinus rhythm    Ectopy:     Ectopy: none    QRS:     QRS axis:  Normal    QRS intervals:  Normal  Conduction:     Conduction: normal    ST segments:     ST segments:  Normal  T waves:     T waves: normal           Bull Keane MD  08/13/18 1768

## 2018-08-13 NOTE — RESPIRATORY THERAPY NOTE
RT Protocol Note  Emilia Alfredo 41 y o  female MRN: [id number]  Unit/Bed#: 406-01 Encounter: [id number]    Assessment    Active Problems:    * No active hospital problems  *      Home Pulmonary Medications:  none       Past Medical History:   Diagnosis Date    ADHD     Anoxic brain damage (HCC)     Autistic disorder     Hyperammonemia (HCC)     Hyperkeratosis     Hypotension     Intellectual disability     Intellectual disability     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with tonic seizures (HCC)     Lethargy     Liver enzyme elevation     Onychomycosis     Osteoporosis     Osteoporosis     Psychiatric disorder     anxiety     Social History     Social History    Marital status: Single     Spouse name: N/A    Number of children: N/A    Years of education: N/A     Social History Main Topics    Smoking status: Never Smoker    Smokeless tobacco: Never Used    Alcohol use No    Drug use: No    Sexual activity: No     Other Topics Concern    None     Social History Narrative    None       Subjective         Objective    Physical Exam:   Assessment Type: Assess only  General Appearance: Awake  Respiratory Pattern: Normal  Chest Assessment: Chest expansion symmetrical  Bilateral Breath Sounds: Diminished, Coarse    Vitals:  Blood pressure 97/60, pulse 76, temperature (!) 97 3 °F (36 3 °C), temperature source Temporal, resp  rate 20, height 4' 9" (1 448 m), weight 44 8 kg (98 lb 12 3 oz), SpO2 97 %  Results from last 7 days  Lab Units 08/13/18  1048   PH ART  7 386   PCO2 ART mm Hg 55 6*   PO2 ART mm Hg 78 4   HCO3 ART mmol/L 32 6*   BASE EXC ART mmol/L 5 9   O2 CONTENT ART mL/dL 19 0   O2 HGB, ARTERIAL % 94 2   ABG SOURCE  Radial, Right   REZA TEST  Yes   NON VENT ROOM AIR % RA       Imaging and other studies: I have personally reviewed pertinent reports  Plan    Respiratory Plan: No distress/Pulmonary history        Q6PRN albuterol for wheezing

## 2018-08-13 NOTE — RESPIRATORY THERAPY NOTE
Pt was taken off of Vapotherm and placed on RA, per Dr Roscoe Tuttle  Pt was then placed back on Vapotherm of 10L and 28% after discussion with Dr Herbert Merida  RN aware

## 2018-08-13 NOTE — ASSESSMENT & PLAN NOTE
Patient with altered mental status compared to baseline per her nursing facility, possible underlying pneumonia, follow-up cultures, continue with IV Zosyn

## 2018-08-13 NOTE — PLAN OF CARE
DISCHARGE PLANNING     Discharge to home or other facility with appropriate resources Progressing        GASTROINTESTINAL - ADULT     Minimal or absence of nausea and/or vomiting Progressing        INFECTION - ADULT     Absence or prevention of progression during hospitalization Progressing        Knowledge Deficit     Patient/family/caregiver demonstrates understanding of disease process, treatment plan, medications, and discharge instructions Progressing        NEUROSENSORY - ADULT     Absence of seizures Progressing        PAIN - ADULT     Verbalizes/displays adequate comfort level or baseline comfort level Progressing        RESPIRATORY - ADULT     Achieves optimal ventilation and oxygenation Progressing        SAFETY ADULT     Patient will remain free of falls Progressing     Maintain or return to baseline ADL function Progressing     Maintain or return mobility status to optimal level Progressing

## 2018-08-14 LAB
ALBUMIN SERPL BCP-MCNC: 2 G/DL (ref 3.5–5)
ALP SERPL-CCNC: 119 U/L (ref 46–116)
ALT SERPL W P-5'-P-CCNC: 246 U/L (ref 12–78)
ANION GAP SERPL CALCULATED.3IONS-SCNC: 4 MMOL/L (ref 4–13)
ARTERIAL PATENCY WRIST A: YES
AST SERPL W P-5'-P-CCNC: 295 U/L (ref 5–45)
BASE EXCESS BLDA CALC-SCNC: 2.3 MMOL/L
BASOPHILS # BLD AUTO: 0.02 THOUSANDS/ΜL (ref 0–0.1)
BASOPHILS NFR BLD AUTO: 0 % (ref 0–1)
BILIRUB SERPL-MCNC: 0.6 MG/DL (ref 0.2–1)
BUN SERPL-MCNC: 8 MG/DL (ref 5–25)
CALCIUM SERPL-MCNC: 8.1 MG/DL (ref 8.3–10.1)
CHLORIDE SERPL-SCNC: 108 MMOL/L (ref 100–108)
CO2 SERPL-SCNC: 27 MMOL/L (ref 21–32)
CREAT SERPL-MCNC: 0.51 MG/DL (ref 0.6–1.3)
EOSINOPHIL # BLD AUTO: 0.18 THOUSAND/ΜL (ref 0–0.61)
EOSINOPHIL NFR BLD AUTO: 2 % (ref 0–6)
ERYTHROCYTE [DISTWIDTH] IN BLOOD BY AUTOMATED COUNT: 14.7 % (ref 11.6–15.1)
GFR SERPL CREATININE-BSD FRML MDRD: 123 ML/MIN/1.73SQ M
GLUCOSE SERPL-MCNC: 82 MG/DL (ref 65–140)
HCO3 BLDA-SCNC: 29.3 MMOL/L (ref 22–28)
HCT VFR BLD AUTO: 37.3 % (ref 34.8–46.1)
HGB BLD-MCNC: 12.2 G/DL (ref 11.5–15.4)
INR PPP: 1.35 (ref 0.86–1.17)
L PNEUMO1 AG UR QL IA.RAPID: NEGATIVE
LYMPHOCYTES # BLD AUTO: 2.09 THOUSANDS/ΜL (ref 0.6–4.47)
LYMPHOCYTES NFR BLD AUTO: 27 % (ref 14–44)
MAGNESIUM SERPL-MCNC: 1.8 MG/DL (ref 1.6–2.6)
MCH RBC QN AUTO: 32.8 PG (ref 26.8–34.3)
MCHC RBC AUTO-ENTMCNC: 32.7 G/DL (ref 31.4–37.4)
MCV RBC AUTO: 100 FL (ref 82–98)
MONOCYTES # BLD AUTO: 0.98 THOUSAND/ΜL (ref 0.17–1.22)
MONOCYTES NFR BLD AUTO: 13 % (ref 4–12)
NEUTROPHILS # BLD AUTO: 4.52 THOUSANDS/ΜL (ref 1.85–7.62)
NEUTS SEG NFR BLD AUTO: 58 % (ref 43–75)
NON VENT ROOM AIR: ABNORMAL %
O2 CT BLDA-SCNC: 17.7 ML/DL (ref 16–23)
OXYHGB MFR BLDA: 93.6 % (ref 94–97)
PCO2 BLDA: 55.5 MM HG (ref 36–44)
PH BLDA: 7.34 [PH] (ref 7.35–7.45)
PHOSPHATE SERPL-MCNC: 4.3 MG/DL (ref 2.7–4.5)
PLATELET # BLD AUTO: 169 THOUSANDS/UL (ref 149–390)
PMV BLD AUTO: 11.9 FL (ref 8.9–12.7)
PO2 BLDA: 74.6 MM HG (ref 75–129)
POTASSIUM SERPL-SCNC: 3.6 MMOL/L (ref 3.5–5.3)
PROT SERPL-MCNC: 5.4 G/DL (ref 6.4–8.2)
PROTHROMBIN TIME: 16 SECONDS (ref 11.8–14.2)
RBC # BLD AUTO: 3.72 MILLION/UL (ref 3.81–5.12)
S PNEUM AG UR QL: NEGATIVE
SODIUM SERPL-SCNC: 139 MMOL/L (ref 136–145)
SPECIMEN SOURCE: ABNORMAL
WBC # BLD AUTO: 7.79 THOUSAND/UL (ref 4.31–10.16)

## 2018-08-14 PROCEDURE — 82805 BLOOD GASES W/O2 SATURATION: CPT | Performed by: INTERNAL MEDICINE

## 2018-08-14 PROCEDURE — 85610 PROTHROMBIN TIME: CPT | Performed by: INTERNAL MEDICINE

## 2018-08-14 PROCEDURE — 84100 ASSAY OF PHOSPHORUS: CPT | Performed by: INTERNAL MEDICINE

## 2018-08-14 PROCEDURE — 99232 SBSQ HOSP IP/OBS MODERATE 35: CPT | Performed by: INTERNAL MEDICINE

## 2018-08-14 PROCEDURE — 85025 COMPLETE CBC W/AUTO DIFF WBC: CPT | Performed by: INTERNAL MEDICINE

## 2018-08-14 PROCEDURE — 83735 ASSAY OF MAGNESIUM: CPT | Performed by: INTERNAL MEDICINE

## 2018-08-14 PROCEDURE — 80053 COMPREHEN METABOLIC PANEL: CPT | Performed by: INTERNAL MEDICINE

## 2018-08-14 PROCEDURE — 36600 WITHDRAWAL OF ARTERIAL BLOOD: CPT

## 2018-08-14 RX ADMIN — LACTULOSE 45 G: 10 SOLUTION ORAL at 10:05

## 2018-08-14 RX ADMIN — WHITE PETROLATUM MINERAL OIL 1 APPLICATION: 150; 830 OINTMENT OPHTHALMIC at 21:49

## 2018-08-14 RX ADMIN — LEVETIRACETAM 1000 MG: 100 SOLUTION ORAL at 21:53

## 2018-08-14 RX ADMIN — LEVOCARNITINE 500 MG: 1 SOLUTION ORAL at 21:53

## 2018-08-14 RX ADMIN — ENOXAPARIN SODIUM 40 MG: 40 INJECTION, SOLUTION INTRAVENOUS; SUBCUTANEOUS at 10:03

## 2018-08-14 RX ADMIN — LACTULOSE 45 G: 10 SOLUTION ORAL at 18:30

## 2018-08-14 RX ADMIN — PIPERACILLIN SODIUM,TAZOBACTAM SODIUM 3.38 G: 3; .375 INJECTION, POWDER, FOR SOLUTION INTRAVENOUS at 02:18

## 2018-08-14 RX ADMIN — VALPROIC ACID 250 MG: 500 SOLUTION ORAL at 10:17

## 2018-08-14 RX ADMIN — WHITE PETROLATUM MINERAL OIL 1 APPLICATION: 150; 830 OINTMENT OPHTHALMIC at 10:00

## 2018-08-14 RX ADMIN — TOPIRAMATE 250 MG: 100 TABLET, FILM COATED ORAL at 10:15

## 2018-08-14 RX ADMIN — LEVOCARNITINE 500 MG: 1 SOLUTION ORAL at 14:25

## 2018-08-14 RX ADMIN — RUFINAMIDE 1200 MG: 200 TABLET, FILM COATED ORAL at 18:31

## 2018-08-14 RX ADMIN — Medication 250 MG: at 18:32

## 2018-08-14 RX ADMIN — RUFINAMIDE 1200 MG: 200 TABLET, FILM COATED ORAL at 10:08

## 2018-08-14 RX ADMIN — Medication 250 MG: at 10:10

## 2018-08-14 RX ADMIN — WHITE PETROLATUM MINERAL OIL 1 APPLICATION: 150; 830 OINTMENT OPHTHALMIC at 18:30

## 2018-08-14 RX ADMIN — POTASSIUM CHLORIDE 20 MEQ: 20 SOLUTION ORAL at 10:06

## 2018-08-14 RX ADMIN — LEVOCARNITINE 500 MG: 1 SOLUTION ORAL at 05:46

## 2018-08-14 RX ADMIN — TOPIRAMATE 250 MG: 100 TABLET, FILM COATED ORAL at 21:52

## 2018-08-14 RX ADMIN — VALPROIC ACID 250 MG: 500 SOLUTION ORAL at 21:50

## 2018-08-14 RX ADMIN — SODIUM CHLORIDE 125 ML/HR: 0.9 INJECTION, SOLUTION INTRAVENOUS at 04:49

## 2018-08-14 RX ADMIN — PHENOBARBITAL 15.2 MG: 20 LIQUID ORAL at 21:49

## 2018-08-14 RX ADMIN — LEVETIRACETAM 1000 MG: 100 SOLUTION ORAL at 10:06

## 2018-08-14 RX ADMIN — VALPROIC ACID 250 MG: 500 SOLUTION ORAL at 02:17

## 2018-08-14 RX ADMIN — PIPERACILLIN SODIUM,TAZOBACTAM SODIUM 3.38 G: 3; .375 INJECTION, POWDER, FOR SOLUTION INTRAVENOUS at 09:59

## 2018-08-14 RX ADMIN — VALPROIC ACID 250 MG: 500 SOLUTION ORAL at 14:25

## 2018-08-14 RX ADMIN — LACTULOSE 45 G: 10 SOLUTION ORAL at 21:50

## 2018-08-14 NOTE — PLAN OF CARE
Problem: Nutrition/Hydration-ADULT  Goal: Nutrient/Hydration intake appropriate for improving, restoring or maintaining nutritional needs  Monitor and assess patient's nutrition/hydration status for malnutrition (ex- brittle hair, bruises, dry skin, pale skin and conjunctiva, muscle wasting, smooth red tongue, and disorientation)  Collaborate with interdisciplinary team and initiate plan and interventions as ordered  Monitor patient's weight and dietary intake as ordered or per policy  Utilize nutrition screening tool and intervene per policy  Determine patient's food preferences and provide high-protein, high-caloric foods as appropriate  INTERVENTIONS:  - Monitor oral intake, urinary output, labs, and treatment plans  - Assess nutrition and hydration status and recommend course of action  - Evaluate amount of meals eaten  - Assist patient with eating if necessary   - Allow adequate time for meals  - Recommend/ encourage appropriate diets, oral nutritional supplements, and vitamin/mineral supplements  - Order, calculate, and assess calorie counts as needed  - Recommend, monitor, and adjust tube feedings and TPN/PPN based on assessed needs  - Assess need for intravenous fluids  - Provide specific nutrition/hydration education as appropriate  - Include patient/family/caregiver in decisions related to nutrition   Outcome: Progressing  Jevity 1 2 300mL bolus q8hrs provided 1080kcal/88% needs met, 50g pro/102% needs met and 726mL H20  Recommend continue current bolus feedings until pt can be evaluated by SLP and appropriate PO diet can be determined and initiated  Goal: Pt will initiate PO diet within the next 24-48hrs and avoid wt loss by next review

## 2018-08-14 NOTE — ASSESSMENT & PLAN NOTE
Continue lactulose , follow up repeat ammonia level tomorrow, mental status today is slightly improved

## 2018-08-14 NOTE — CASE MANAGEMENT
Initial Clinical Review    Admission: Date/Time/Statement: 8/13/18 @ 0855     Orders Placed This Encounter   Procedures    Inpatient Admission (expected length of stay for this patient is greater than two midnights)     Standing Status:   Standing     Number of Occurrences:   1     Order Specific Question:   Admitting Physician     Answer:   Ramirez Sepulveda     Order Specific Question:   Level of Care     Answer:   Med Surg [16]     Order Specific Question:   Estimated length of stay     Answer:   More than 2 Midnights     Order Specific Question:   Certification     Answer:   I certify that inpatient services are medically necessary for this patient for a duration of greater than two midnights  See H&P and MD Progress Notes for additional information about the patient's course of treatment  ED: Date/Time/Mode of Arrival:   ED Arrival Information     Expected Arrival Acuity Means of Arrival Escorted By Service Admission Type    - 8/13/2018 03:59 Emergent [facility]ILI[facility] WITH [facility]    Arrival Complaint    -          Chief Complaint:   Chief Complaint   Patient presents with    Altered Mental Status     Per [facility], pt has been having fever and vomiting and exhibiting increasing lethargy for greater than 24hours - had increased WBC and ammonia levels on bloodwork drawn 08-12-18       History of Illness:     41 y o  female who presents with  Altered mental status from nursing facility   Patient cannot provide any history, only history available chart review       Cachectic chronically ill-appearing female   Rhonchi noted bilaterally   Decreased muscle tone, patient does not follow commands, she is not moving any of her extremities during my exam             Altered Mental Status       Per [facility], pt has been having fever and vomiting and exhibiting increasing lethargy for greater than 24hours - had increased WBC and ammonia levels on bloodwork drawn 08-12-18      [facility] home called ahead to say that Amando Mosher is more lethargic  Leonardo Blade she resists care but now today she does not     She had blood work done     wgt  44 8  Kg/  98 lb 12 3 oz    08/13/18 1537  97 5 °F (36 4 °C)  77  19  95/63  96 %  None (Room air)  Lying   08/13/18 1301  --  76  20  --  97 %  --  --   08/13/18 1222  --  --  --  97/60  --  --  Lying   08/13/18 1045   97 3 °F (36 3 °C)  77  20   81/51  98 %  None (Room air)  Lying   08/13/18 1024  --  --  --   86/54  --  --  --   08/13/18 0900  --  76  20  91/59  97 %  Other (comment)  Lying   O2 Device: VapoTherm at 08/13/18 0900   08/13/18 0737  --  --  --  --  96 %  --  --   08/13/18 0700  --  78  20  98/64  99 %  --  --   08/13/18 0630  --  79  20  95/61  98 %  --  --   08/13/18 0615  --  79  20  --  99 %  --  --   08/13/18 0505  --  --  --  --  100 %  --  --   08/13/18 0406  97 8 °F (36 6 °C)  64   24   87/57  99 %  None (Room air)       Abnormal Labs/Diagnostic Test Results:   crt  0 54  0 51  Alk phos  130   119  Alt  210   246  ast  216   295  Ammonia  40  No acute CT finding in the chest, abdomen and pelvis    ABG  @  0434  7 386  /  54 7  /  79 6  /  32 1       1048  7 386  /  55 6  /  78 4 /  32 6       0900  7 340  /  55 5  /  74 6  / 29 3       ED Treatment:  Placed on Vapotherm of 10L and 28% oxygen     Medication Administration from 08/13/2018 0342 to 08/13/2018 1036       Date/Time Order Dose Route Action Action by Comments                08/13/2018 0456 sodium chloride 0 9 % bolus 1,000 mL 1,000 mL Intravenous Merissa 37 Angelina Briones RN                 08/13/2018 0540 sodium chloride 0 9 % bolus 1,000 mL 1,000 mL Intravenous New Bag Margy Granados RN                 08/13/2018 0430 piperacillin-tazobactam (ZOSYN) 4 5 g in sodium chloride 0 9 % 100 mL IVPB 4 5 g Intravenous Merissa 37 Margy Cordoab RN      08/13/2018 0547 albuterol inhalation solution 5 mg 2 5 mg Nebulization Given Antionette Stevens, RT      08/13/2018 0501 albuterol inhalation solution 5 mg 2 5 mg Nebulization Given Vielka Elaine, RT      08/13/2018 0501 ipratropium (ATROVENT) 0 02 % inhalation solution 0 5 mg 0 5 mg Nebulization Given Vielka Elaine, RT                      Past Medical/Surgical History:    Active Ambulatory Problems     Diagnosis Date Noted    Dysphagia 02/22/2017    Acute encephalopathy 02/23/2017    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 07/06/2017    Lactic acidosis 07/06/2017    Gastrostomy tube obstruction (HCC) 07/14/2017    Excessive daytime sleepiness 11/22/2017    Underweight 11/22/2017    Intellectual disability 11/22/2017    Hyperammonemia (HCC) 11/23/2017    Acute DANIEL (middle ear effusion) 11/23/2017    Macrocytic anemia 11/24/2017    Hypokalemia 11/24/2017    Acute respiratory failure with hypercapnia (HCC) 12/02/2017    Hypernatremia 12/06/2017    Osteoporosis 09/17/2013    Onychomycosis 09/17/2013    Lethargy 10/10/2016    Hyperkeratosis 09/17/2013    Elevated liver enzymes 06/21/2016    ADHD, 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    Intractable epilepsy without status epilepticus ([facility] ) 04/23/2018     Resolved Ambulatory Problems     Diagnosis Date Noted    Pneumonia 02/22/2017    HCAP (healthcare-associated pneumonia) 02/23/2017    Positive blood culture 07/07/2017    Cerebral anoxic injury ([facility] ) 09/17/2013     Past Medical History:   Diagnosis Date    ADHD     Anoxic brain damage (HCC)     Autistic disorder     Hyperammonemia (HCC)     Hyperkeratosis     Hypotension     Intellectual disability     Intellectual disability     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with tonic seizures (HCC)     Lethargy     Liver enzyme elevation     Onychomycosis     Osteoporosis     Osteoporosis     Psychiatric disorder        Admitting Diagnosis: Altered mental status [R41 82]  Acute respiratory failure with hypercapnia (HCC) [J96 02]  Abnormal transaminases [R74 8]    Assessment/Plan:   Acute encephalopathy Assessment & Plan      Patient with altered mental status compared to baseline per her nursing facility, possible underlying pneumonia, follow-up cultures, continue with IV Zosyn           Acute respiratory failure with hypercapnia (HCC)   Assessment & Plan      Continuous pulse ox monitor, will repeat ABG in a m           Hyperammonemia (HCC)   Assessment & Plan       Continue lactulose          Intractable epilepsy without status epilepticus ([facility] )   Assessment & Plan      Continue outpatient anti epileptic drug regimen          Elevated liver enzymes   Assessment & Plan      Follow up daily labs, patient has had elevated LFTs in the past          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with tonic seizures ([facility] )   Assessment & Plan      Continue medical management as noted above          Underweight   Assessment & Plan      Will plan on resuming tube feeds tomorrow           Dysphagia   Assessment & Plan      NPO today, will resume tube feeds tomorrow             Admission Orders:  Admit telemetry - continuous pulse ox  Speech bedside swallow eval and rx;  Aspiration precautions  Incentive spirometry q 1 hr while awake  Up w/assist  IVF @ 125  Patient will be NPO, will hold patient's Jevity 1 2 mL t i d  Boluses     consult to speech therapy, hopefully can resume oral diet with tube feedings tomorrow      Scheduled Meds:   Current Facility-Administered Medications:  acetaminophen 650 mg Per G Tube Q6H PRN Carlyn White DO    albuterol 2 5 mg Nebulization Q6H PRN Carlyn White DO    artificial tear 1 application Ophthalmic TID Carlyn White DO    enoxaparin 40 mg Subcutaneous Daily Tristan Harvey DO    lactulose 45 g Per G Tube TID Carlyn White DO    levETIRAcetam 1,000 mg Per G Tube Q12H [facility] Carlyn White DO    levOCARNitine 500 mg Per G Tube TID Carlyn White DO    magnesium hydroxide 30 mL Per G Tube Daily PRN Carlyn White DO    melatonin 6 mg Per G Tube HS Carlyn White DO    perampanel 10 mg Per NG Tube HS Alverto Denis Candice, DO    PHENobarbital 15 2 mg Per G Tube HS Desean Swenson DO    piperacillin-tazobactam 3 375 g Intravenous Q6H Desean Swenson DO Last Rate: 3 375 g (08/14/18 0959)   potassium chloride 20 mEq Per G Tube Daily Desean Swenson DO    rufinamide 1,200 mg Per G Tube BID Desean Swenson DO    saccharomyces boulardii 250 mg Oral BID Desean Swenson DO    sodium chloride (PF) 3 mL Intravenous PRN Cathy Mathis MD    sodium chloride 125 mL/hr Intravenous Continuous Desean Swenson DO Last Rate: 125 mL/hr (08/14/18 0449)   topiramate 250 mg Per G Tube BID Desean Swenson DO    Valproic Acid 250 mg Per G Tube Q6H Desean Swenson DO        Report    08/13 0701  08/14 0700 08/14 0701  08/14 1112  Most Recent    Temperature (°F) 97 397 8 97 3  97 3 (36 3)    Pulse 7679 75  75    Respirations 1820 18  18    Blood Pressure 81/5197/60 129/88  129/88    SpO2 (%) 9698 95  95

## 2018-08-14 NOTE — ASSESSMENT & PLAN NOTE
Resume tube feeds, speech consult for oral diet, patient was previously on a mechanical soft diet with thin liquids per [place]vel[place]esse the nurse at [facility]

## 2018-08-14 NOTE — ASSESSMENT & PLAN NOTE
Patient with altered mental status compared to baseline per her nursing facility, There is a question of possible underlying pneumonia, procalcitonin level is low, will discontinue antibiotics and continue monitoring, follow-up cultures  Possible underlying viral syndrome, patient's acute encephalopathy may be due to the fact that she had nausea and vomiting prior to admission which interrupted her seizure medication

## 2018-08-14 NOTE — SPEECH THERAPY NOTE
Speech Language/Pathology    Consult received and chart reviewed  Attempted evaluation and unable to wake patient for appropriate alertness for PO trials  Spoke with RN; and spoke with patient's [address]  She reports patient is typically on regular diet level of only finger foods and thin liquids  Her intake varies daily  Will follow when patient is more alert   Thank you for this referral

## 2018-08-14 NOTE — PLAN OF CARE
DISCHARGE PLANNING     Discharge to home or other facility with appropriate resources Progressing        DISCHARGE PLANNING - CARE MANAGEMENT     Discharge to post-acute care or home with appropriate resources Progressing        GASTROINTESTINAL - ADULT     Minimal or absence of nausea and/or vomiting Progressing        INFECTION - ADULT     Absence or prevention of progression during hospitalization Progressing        Knowledge Deficit     Patient/family/caregiver demonstrates understanding of disease process, treatment plan, medications, and discharge instructions Progressing        NEUROSENSORY - ADULT     Absence of seizures Progressing        Nutrition/Hydration-ADULT     Nutrient/Hydration intake appropriate for improving, restoring or maintaining nutritional needs Progressing        PAIN - ADULT     Verbalizes/displays adequate comfort level or baseline comfort level Progressing        Potential for Falls     Patient will remain free of falls Progressing        Prexisting or High Potential for Compromised Skin Integrity     Skin integrity is maintained or improved Progressing        RESPIRATORY - ADULT     Achieves optimal ventilation and oxygenation Progressing        SAFETY ADULT     Patient will remain free of falls Progressing     Maintain or return to baseline ADL function Progressing     Maintain or return mobility status to optimal level Progressing

## 2018-08-14 NOTE — ASSESSMENT & PLAN NOTE
Elevate head of bed, continue supportive care, will consider BiPAP if patient has worsening hypercapnia, respiratory status should improve with supportive care including restarting tube feeds, continuous monitoring and assurance that patient is taking prescribed antiseizure drugs

## 2018-08-14 NOTE — PROGRESS NOTES
Progress Note - Angeline Loss 1980, 41 y o  female MRN: [id number]    Unit/Bed#: 406-01 Encounter: [id number]    Primary Care Provider: Fatmata Pepper DO   Date and time admitted to hospital: 8/13/2018  4:00 AM        * Acute encephalopathy   Assessment & Plan     Patient with altered mental status compared to baseline per her nursing facility, There is a question of possible underlying pneumonia, procalcitonin level is low, will discontinue antibiotics and continue monitoring, follow-up cultures  Possible underlying viral syndrome, patient's acute encephalopathy may be due to the fact that she had nausea and vomiting prior to admission which interrupted her seizure medication  Acute respiratory failure with hypercapnia (HCC)   Assessment & Plan      Elevate head of bed, continue supportive care, will consider BiPAP if patient has worsening hypercapnia, respiratory status should improve with supportive care including restarting tube feeds, continuous monitoring and assurance that patient is taking prescribed antiseizure drugs  Hyperammonemia (HCC)   Assessment & Plan      Continue lactulose , follow up repeat ammonia level tomorrow, mental status today is slightly improved  Intractable epilepsy without status epilepticus [facility])   Assessment & Plan      Continue current anti epileptic drug regimen, suspect that patient missed several doses due to vomiting over the weekend  Elevated liver enzymes   Assessment & Plan     Follow up daily labs, patient has had elevated LFTs in the past        Dysphagia   Assessment & Plan      Resume tube feeds, speech consult for oral diet, patient was previously on a mechanical soft diet with thin liquids per Ramila Curran the nurse at [facility]            VTE Pharmacologic Prophylaxis:   Pharmacologic: Enoxaparin (Lovenox)  Mechanical VTE Prophylaxis in Place: Yes    Patient Centered Rounds: I have performed bedside rounds with nursing staff today  Current Length of Stay: 1 day(s)    Current Patient Status: Inpatient   Certification Statement: The patient will continue to require additional inpatient hospital stay due to altered mental status    Discharge Plan / Estimated Discharge Date:  Patient require several more days, possible discharge near the end of this week      Code Status: Level 1 - Full Code      Subjective:    patient is slightly more interactive today,  Patient is nonverbal at baseline  Objective:     Vitals:   Temp (24hrs), Av 5 °F (36 4 °C), Min:97 3 °F (36 3 °C), Max:97 8 °F (36 6 °C)    HR:  [75-79] 75  Resp:  [18-20] 18  BP: ()/(58-88) 129/88  SpO2:  [95 %-97 %] 95 %  Body mass index is 21 37 kg/m²  Input and Output Summary (last 24 hours): Intake/Output Summary (Last 24 hours) at 18 1222  Last data filed at 18 2112   Gross per 24 hour   Intake                0 ml   Output              150 ml   Net             -150 ml       Physical Exam:     Physical Exam   Constitutional: No distress  Cardiovascular: Normal rate, regular rhythm and intact distal pulses  Exam reveals no friction rub  No murmur heard  Pulmonary/Chest: Effort normal and breath sounds normal  No respiratory distress  She has no wheezes  Neurological: She is alert  Patient is more interactive today to verbal stimuli and physical stimuli  Skin: Skin is warm and dry  She is not diaphoretic  No erythema  Psychiatric: She has a normal mood and affect  Nursing note and vitals reviewed          Additional Data:     Labs:      Results from last 7 days  Lab Units 18  0510   WBC Thousand/uL 7 79   HEMOGLOBIN g/dL 12 2   HEMATOCRIT % 37 3   PLATELETS Thousands/uL 169   NEUTROS PCT % 58   LYMPHS PCT % 27   MONOS PCT % 13*   EOS PCT % 2       Results from last 7 days  Lab Units 18  0510   SODIUM mmol/L 139   POTASSIUM mmol/L 3 6   CHLORIDE mmol/L 108   CO2 mmol/L 27   BUN mg/dL 8   CREATININE mg/dL 0 51* CALCIUM mg/dL 8 1*   TOTAL PROTEIN g/dL 5 4*   BILIRUBIN TOTAL mg/dL 0 60   ALK PHOS U/L 119*   ALT U/L 246*   AST U/L 295*   GLUCOSE RANDOM mg/dL 82       Results from last 7 days  Lab Units 08/14/18  0510   INR  1 35*       * I Have Reviewed All Lab Data Listed Above  * Additional Pertinent Lab Tests Reviewed: All [place] Admission Reviewed          Recent Cultures (last 7 days):       Results from last 7 days  Lab Units 08/13/18  1736 08/13/18  0424   BLOOD CULTURE   --  No Growth at 24 hrs  No Growth at 24 hrs     LEGIONELLA URINARY ANTIGEN  Negative  --        Last 24 Hours Medication List:     Current Facility-Administered Medications:  acetaminophen 650 mg Per G Tube Q6H PRN Nelida Shows, DO   albuterol 2 5 mg Nebulization Q6H PRN Nelida Shows, DO   artificial tear 1 application Ophthalmic TID Nelida Shows, DO   enoxaparin 40 mg Subcutaneous Daily Tristan Candice, DO   lactulose 45 g Per G Tube TID Nelida Shows, DO   levETIRAcetam 1,000 mg Per G Tube Q12H [address] Nelida Shows, DO   levOCARNitine 500 mg Per G Tube TID Nelida Shows, DO   magnesium hydroxide 30 mL Per G Tube Daily PRN Nelida Shows, DO   melatonin 6 mg Per G Tube HS Nelida Shows, DO   perampanel 10 mg Per NG Tube HS Nelida Shows, DO   PHENobarbital 15 2 mg Per G Tube HS Nelida Shows, DO   potassium chloride 20 mEq Per G Tube Daily Nelida Shows, DO   rufinamide 1,200 mg Per G Tube BID Nelida Shows, DO   saccharomyces boulardii 250 mg Oral BID Nelida Shows, DO   sodium chloride (PF) 3 mL Intravenous PRN Shilpa Fuller MD   topiramate 250 mg Per G Tube BID Nelida Shows, DO   Valproic Acid 250 mg Per G Tube Q6H Nelida Shows, DO        Today, Patient Was Seen By: Nelida Shows, DO

## 2018-08-14 NOTE — ASSESSMENT & PLAN NOTE
Continue current anti epileptic drug regimen, suspect that patient missed several doses due to vomiting over the weekend

## 2018-08-15 ENCOUNTER — APPOINTMENT (INPATIENT)
Dept: ULTRASOUND IMAGING | Facility: HOSPITAL | Age: 38
DRG: 070 | End: 2018-08-15
Payer: MEDICARE

## 2018-08-15 ENCOUNTER — APPOINTMENT (INPATIENT)
Dept: CT IMAGING | Facility: HOSPITAL | Age: 38
DRG: 070 | End: 2018-08-15
Payer: MEDICARE

## 2018-08-15 LAB
ALBUMIN SERPL BCP-MCNC: 2 G/DL (ref 3.5–5)
ALP SERPL-CCNC: 141 U/L (ref 46–116)
ALT SERPL W P-5'-P-CCNC: 287 U/L (ref 12–78)
AMMONIA PLAS-SCNC: 46 UMOL/L (ref 11–35)
ANION GAP SERPL CALCULATED.3IONS-SCNC: 7 MMOL/L (ref 4–13)
AST SERPL W P-5'-P-CCNC: 274 U/L (ref 5–45)
BASOPHILS # BLD AUTO: 0.05 THOUSANDS/ΜL (ref 0–0.1)
BASOPHILS NFR BLD AUTO: 1 % (ref 0–1)
BILIRUB SERPL-MCNC: 0.5 MG/DL (ref 0.2–1)
BUN SERPL-MCNC: 13 MG/DL (ref 5–25)
CALCIUM SERPL-MCNC: 8.5 MG/DL (ref 8.3–10.1)
CHLORIDE SERPL-SCNC: 104 MMOL/L (ref 100–108)
CO2 SERPL-SCNC: 25 MMOL/L (ref 21–32)
CREAT SERPL-MCNC: 0.39 MG/DL (ref 0.6–1.3)
EOSINOPHIL # BLD AUTO: 0.18 THOUSAND/ΜL (ref 0–0.61)
EOSINOPHIL NFR BLD AUTO: 2 % (ref 0–6)
ERYTHROCYTE [DISTWIDTH] IN BLOOD BY AUTOMATED COUNT: 14.4 % (ref 11.6–15.1)
GFR SERPL CREATININE-BSD FRML MDRD: 135 ML/MIN/1.73SQ M
GLUCOSE SERPL-MCNC: 98 MG/DL (ref 65–140)
HCT VFR BLD AUTO: 39 % (ref 34.8–46.1)
HGB BLD-MCNC: 12.9 G/DL (ref 11.5–15.4)
IMM GRANULOCYTES # BLD AUTO: 0.05 THOUSAND/UL (ref 0–0.2)
IMM GRANULOCYTES NFR BLD AUTO: 1 % (ref 0–2)
INR PPP: 1.31 (ref 0.86–1.17)
LYMPHOCYTES # BLD AUTO: 2.58 THOUSANDS/ΜL (ref 0.6–4.47)
LYMPHOCYTES NFR BLD AUTO: 26 % (ref 14–44)
MAGNESIUM SERPL-MCNC: 2.3 MG/DL (ref 1.6–2.6)
MCH RBC QN AUTO: 33.1 PG (ref 26.8–34.3)
MCHC RBC AUTO-ENTMCNC: 33.1 G/DL (ref 31.4–37.4)
MCV RBC AUTO: 100 FL (ref 82–98)
MONOCYTES # BLD AUTO: 1.15 THOUSAND/ΜL (ref 0.17–1.22)
MONOCYTES NFR BLD AUTO: 12 % (ref 4–12)
MRSA NOSE QL CULT: NORMAL
NEUTROPHILS # BLD AUTO: 5.86 THOUSANDS/ΜL (ref 1.85–7.62)
NEUTS SEG NFR BLD AUTO: 58 % (ref 43–75)
NRBC BLD AUTO-RTO: 0 /100 WBCS
PLATELET # BLD AUTO: 206 THOUSANDS/UL (ref 149–390)
PMV BLD AUTO: 11.3 FL (ref 8.9–12.7)
POTASSIUM SERPL-SCNC: 3.3 MMOL/L (ref 3.5–5.3)
PROT SERPL-MCNC: 5.9 G/DL (ref 6.4–8.2)
PROTHROMBIN TIME: 15.7 SECONDS (ref 11.8–14.2)
RBC # BLD AUTO: 3.9 MILLION/UL (ref 3.81–5.12)
SODIUM SERPL-SCNC: 136 MMOL/L (ref 136–145)
WBC # BLD AUTO: 9.87 THOUSAND/UL (ref 4.31–10.16)

## 2018-08-15 PROCEDURE — 82140 ASSAY OF AMMONIA: CPT | Performed by: INTERNAL MEDICINE

## 2018-08-15 PROCEDURE — 80053 COMPREHEN METABOLIC PANEL: CPT | Performed by: INTERNAL MEDICINE

## 2018-08-15 PROCEDURE — 85610 PROTHROMBIN TIME: CPT | Performed by: INTERNAL MEDICINE

## 2018-08-15 PROCEDURE — 94762 N-INVAS EAR/PLS OXIMTRY CONT: CPT

## 2018-08-15 PROCEDURE — 94660 CPAP INITIATION&MGMT: CPT

## 2018-08-15 PROCEDURE — 85025 COMPLETE CBC W/AUTO DIFF WBC: CPT | Performed by: INTERNAL MEDICINE

## 2018-08-15 PROCEDURE — 76705 ECHO EXAM OF ABDOMEN: CPT

## 2018-08-15 PROCEDURE — 70450 CT HEAD/BRAIN W/O DYE: CPT

## 2018-08-15 PROCEDURE — 94760 N-INVAS EAR/PLS OXIMETRY 1: CPT

## 2018-08-15 PROCEDURE — 83735 ASSAY OF MAGNESIUM: CPT | Performed by: INTERNAL MEDICINE

## 2018-08-15 PROCEDURE — 99232 SBSQ HOSP IP/OBS MODERATE 35: CPT | Performed by: INTERNAL MEDICINE

## 2018-08-15 RX ORDER — POTASSIUM CHLORIDE 20MEQ/15ML
40 LIQUID (ML) ORAL ONCE
Status: COMPLETED | OUTPATIENT
Start: 2018-08-15 | End: 2018-08-15

## 2018-08-15 RX ADMIN — LACTULOSE 45 G: 10 SOLUTION ORAL at 16:40

## 2018-08-15 RX ADMIN — RUFINAMIDE 1200 MG: 200 TABLET, FILM COATED ORAL at 10:37

## 2018-08-15 RX ADMIN — WHITE PETROLATUM MINERAL OIL 1 APPLICATION: 150; 830 OINTMENT OPHTHALMIC at 16:42

## 2018-08-15 RX ADMIN — RUFINAMIDE 1200 MG: 200 TABLET, FILM COATED ORAL at 17:42

## 2018-08-15 RX ADMIN — POTASSIUM CHLORIDE 20 MEQ: 20 SOLUTION ORAL at 10:37

## 2018-08-15 RX ADMIN — ENOXAPARIN SODIUM 40 MG: 40 INJECTION, SOLUTION INTRAVENOUS; SUBCUTANEOUS at 10:35

## 2018-08-15 RX ADMIN — VALPROIC ACID 250 MG: 500 SOLUTION ORAL at 03:41

## 2018-08-15 RX ADMIN — LEVETIRACETAM 1000 MG: 100 SOLUTION ORAL at 10:36

## 2018-08-15 RX ADMIN — LEVOCARNITINE 500 MG: 1 SOLUTION ORAL at 22:05

## 2018-08-15 RX ADMIN — TOPIRAMATE 250 MG: 100 TABLET, FILM COATED ORAL at 10:41

## 2018-08-15 RX ADMIN — WHITE PETROLATUM MINERAL OIL 1 APPLICATION: 150; 830 OINTMENT OPHTHALMIC at 10:34

## 2018-08-15 RX ADMIN — POTASSIUM CHLORIDE 40 MEQ: 20 SOLUTION ORAL at 16:41

## 2018-08-15 RX ADMIN — LACTULOSE 45 G: 10 SOLUTION ORAL at 21:57

## 2018-08-15 RX ADMIN — MELATONIN TAB 3 MG 6 MG: 3 TAB at 21:58

## 2018-08-15 RX ADMIN — TOPIRAMATE 250 MG: 100 TABLET, FILM COATED ORAL at 21:57

## 2018-08-15 RX ADMIN — LEVOCARNITINE 500 MG: 1 SOLUTION ORAL at 05:24

## 2018-08-15 RX ADMIN — Medication 250 MG: at 10:40

## 2018-08-15 RX ADMIN — VALPROIC ACID 250 MG: 500 SOLUTION ORAL at 10:43

## 2018-08-15 RX ADMIN — WHITE PETROLATUM MINERAL OIL 1 APPLICATION: 150; 830 OINTMENT OPHTHALMIC at 22:05

## 2018-08-15 RX ADMIN — LACTULOSE 45 G: 10 SOLUTION ORAL at 10:35

## 2018-08-15 RX ADMIN — Medication 250 MG: at 17:42

## 2018-08-15 RX ADMIN — PHENOBARBITAL 15.2 MG: 20 LIQUID ORAL at 21:58

## 2018-08-15 RX ADMIN — LEVETIRACETAM 1000 MG: 100 SOLUTION ORAL at 21:58

## 2018-08-15 RX ADMIN — VALPROIC ACID 250 MG: 500 SOLUTION ORAL at 21:57

## 2018-08-15 RX ADMIN — LEVOCARNITINE 500 MG: 1 SOLUTION ORAL at 15:10

## 2018-08-15 RX ADMIN — VALPROIC ACID 250 MG: 500 SOLUTION ORAL at 15:10

## 2018-08-15 NOTE — PROGRESS NOTES
Progress Note - Richard Eddy 1980, 41 y o  female MRN: [id number]    Unit/Bed#: 406-01 Encounter: [id number]    Primary Care Provider: Krista Gusman DO   Date and time admitted to hospital: 8/13/2018  4:00 AM        * Acute encephalopathy   Assessment & Plan     Patient with altered mental status compared to baseline per her nursing facility, There was a question of possible underlying pneumonia, procalcitonin level is low, will discontinue antibiotics and continue monitoring, follow-up cultures  Possible underlying viral syndrome, patient's acute encephalopathy may be due to the fact that she had nausea and vomiting prior to admission which interrupted her seizure medication  Continue supportive care, due to persistently elevated CO2 level, will trial Vapotherm  Acute respiratory failure with hypercapnia (HCC)   Assessment & Plan    Elevate head of bed, continue supportive care, will consider BiPAP if patient has worsening hypercapnia, respiratory status should improve with supportive care including restarting tube feeds, continuous monitoring and assurance that patient is taking prescribed antiseizure drugs  Will initiate available from as above, will correct hypercapnia and reassess mental status  Hyperammonemia (HCC)   Assessment & Plan      Continue lactulose , follow up repeat ammonia level tomorrow, mental status was slightly improved yesterday, patient continues with increased somnolence  Intractable epilepsy without status epilepticus [facility])   Assessment & Plan    Continue current anti epileptic drug regimen, suspect that patient missed several doses due to vomiting over the weekend      According to the nursing home patient had been doing  well on her current regimen for quite a few months        Elevated liver enzymes   Assessment & Plan    Follow up repeat labs tomorrow, check right upper quadrant ultrasound, if no improvement/not trending down by Friday will consult GI          VTE Pharmacologic Prophylaxis:   Pharmacologic: Enoxaparin (Lovenox)  Mechanical VTE Prophylaxis in Place: No    Patient Centered Rounds: I have performed bedside rounds with nursing staff today  Discussions with Specialists or Other Care Team Provider:  Plan of care discussed with respiratory therapist    Time Spent for Care: 45 minutes  More than 50% of total time spent on counseling and coordination of care as described above  Current Length of Stay: 2 day(s)    Current Patient Status: Inpatient   Certification Statement: The patient will continue to require additional inpatient hospital stay due to Continued acute encephalopathy    Discharge Plan / Estimated Discharge Date:   Discharge is pending improvement in mentation      Code Status: Level 1 - Full Code      Subjective:   Patient not able to take anything by mouth, no nausea or vomiting, tolerating tube feeds  Patient cannot give any history, case was discussed with nursing staff  Objective:     Vitals:   Temp (24hrs), Av 5 °F (36 4 °C), Min:97 °F (36 1 °C), Max:98 3 °F (36 8 °C)    HR:  [81-82] 82  Resp:  [18] 18  BP: (92-94)/(52-61) 94/61  SpO2:  [93 %-97 %] 97 %  Body mass index is 21 21 kg/m²  Input and Output Summary (last 24 hours): Intake/Output Summary (Last 24 hours) at 08/15/18 1505  Last data filed at 18 2033   Gross per 24 hour   Intake                0 ml   Output                0 ml   Net                0 ml       Physical Exam:     Physical Exam   Constitutional: She is oriented to person, place, and time  She appears well-developed and well-nourished  No distress  Pulmonary/Chest: Effort normal and breath sounds normal  No respiratory distress  She has no wheezes  Abdominal: Soft  Bowel sounds are normal  She exhibits no distension  There is no tenderness  Neurological: She is alert and oriented to person, place, and time  No cranial nerve deficit   Coordination normal    Skin: She is not diaphoretic  Psychiatric: She has a normal mood and affect  Her behavior is normal  Judgment and thought content normal    Nursing note and vitals reviewed  Additional Data:     Labs:      Results from last 7 days  Lab Units 08/15/18  0535   WBC Thousand/uL 9 87   HEMOGLOBIN g/dL 12 9   HEMATOCRIT % 39 0   PLATELETS Thousands/uL 206   NEUTROS PCT % 58   LYMPHS PCT % 26   MONOS PCT % 12   EOS PCT % 2       Results from last 7 days  Lab Units 08/15/18  0535   SODIUM mmol/L 136   POTASSIUM mmol/L 3 3*   CHLORIDE mmol/L 104   CO2 mmol/L 25   BUN mg/dL 13   CREATININE mg/dL 0 39*   CALCIUM mg/dL 8 5   TOTAL PROTEIN g/dL 5 9*   BILIRUBIN TOTAL mg/dL 0 50   ALK PHOS U/L 141*   ALT U/L 287*   AST U/L 274*   GLUCOSE RANDOM mg/dL 98       Results from last 7 days  Lab Units 08/15/18  0535   INR  1 31*       * I Have Reviewed All Lab Data Listed Above  * Additional Pertinent Lab Tests Reviewed: All [facility] Admission Reviewed      Recent Cultures (last 7 days):       Results from last 7 days  Lab Units 08/13/18  1736 08/13/18  0424   BLOOD CULTURE   --  No Growth at 48 hrs  No Growth at 48 hrs     LEGIONELLA URINARY ANTIGEN  Negative  --        Last 24 Hours Medication List:     Current Facility-Administered Medications:  acetaminophen 650 mg Per G Tube Q6H PRN [facility], DO   albuterol 2 5 mg Nebulization Q6H PRN [facility], DO   artificial tear 1 application Ophthalmic TID [facility], DO   enoxaparin 40 mg Subcutaneous Daily Tristan Harvey, DO   lactulose 45 g Per G Tube TID [facility], DO   levETIRAcetam 1,000 mg Per G Tube Q12H [facility], DO   levOCARNitine 500 mg Per G Tube TID [facility], DO   magnesium hydroxide 30 mL Per G Tube Daily PRN [facility], DO   melatonin 6 mg Per G Tube HS [facility], DO   perampanel 10 mg Per NG Tube HS [facility], DO   PHENobarbital 15 2 mg Per G Tube HS [facility], DO   potassium chloride 20 mEq Per G Tube Daily [employer] Candice,    potassium chloride 40 mEq Oral Once Lanny Hancock DO   rufinamide 1,200 mg Per G Tube BID Lanny Hancock DO   saccharomyces boulardii 250 mg Oral BID Lanny Hancock DO   sodium chloride (PF) 3 mL Intravenous PRN Jaskaran Mccoy MD   topiramate 250 mg Per G Tube BID Lanny Hancock DO   Valproic Acid 250 mg Per G Tube Q6H Lanny Hancock DO        Today, Patient Was Seen By: Lanny Hancock DO

## 2018-08-15 NOTE — SPEECH THERAPY NOTE
Speech Language/Pathology    Attempted today to complete dysphagia evaluation  Patient not appropriate or receptive to oral stim or spoon or cup/straw  Patient continues on tube feedings  No PO recommended at this time  Reported status to RN

## 2018-08-15 NOTE — ASSESSMENT & PLAN NOTE
Continue lactulose , follow up repeat ammonia level tomorrow, mental status was slightly improved yesterday, patient continues with increased somnolence

## 2018-08-15 NOTE — RESPIRATORY THERAPY NOTE
VAPO THERM NOTE:  Pt  Placed on HFNC at 21% FI02  35 l/m  Continuous Pulse Ox CERRATO on Tele Monitor  Pt  Will attempt to remove at times, Aide is on one to one observing patient  Pt  Resting comfortably at this time

## 2018-08-15 NOTE — ASSESSMENT & PLAN NOTE
Follow up repeat labs tomorrow, check right upper quadrant ultrasound, if no improvement/not trending down by Friday will consult GI

## 2018-08-15 NOTE — ASSESSMENT & PLAN NOTE
Patient with altered mental status compared to baseline per her nursing facility, There was a question of possible underlying pneumonia, procalcitonin level is low, will discontinue antibiotics and continue monitoring, follow-up cultures  Possible underlying viral syndrome, patient's acute encephalopathy may be due to the fact that she had nausea and vomiting prior to admission which interrupted her seizure medication  Continue supportive care, due to persistently elevated CO2 level, will trial Vapotherm

## 2018-08-15 NOTE — ASSESSMENT & PLAN NOTE
Elevate head of bed, continue supportive care, will consider BiPAP if patient has worsening hypercapnia, respiratory status should improve with supportive care including restarting tube feeds, continuous monitoring and assurance that patient is taking prescribed antiseizure drugs  Will initiate available from as above, will correct hypercapnia and reassess mental status

## 2018-08-15 NOTE — ASSESSMENT & PLAN NOTE
Continue current anti epileptic drug regimen, suspect that patient missed several doses due to vomiting over the weekend      According to the nursing home patient had been doing  well on her current regimen for quite a few months

## 2018-08-16 LAB
ALBUMIN SERPL BCP-MCNC: 2.3 G/DL (ref 3.5–5)
ALP SERPL-CCNC: 146 U/L (ref 46–116)
ALT SERPL W P-5'-P-CCNC: 251 U/L (ref 12–78)
AMMONIA PLAS-SCNC: 73 UMOL/L (ref 11–35)
ANION GAP SERPL CALCULATED.3IONS-SCNC: 7 MMOL/L (ref 4–13)
AST SERPL W P-5'-P-CCNC: 176 U/L (ref 5–45)
BASE EXCESS BLDA CALC-SCNC: 0.3 MMOL/L
BILIRUB SERPL-MCNC: 0.4 MG/DL (ref 0.2–1)
BUN SERPL-MCNC: 19 MG/DL (ref 5–25)
CALCIUM SERPL-MCNC: 8.5 MG/DL (ref 8.3–10.1)
CHLORIDE SERPL-SCNC: 111 MMOL/L (ref 100–108)
CO2 SERPL-SCNC: 26 MMOL/L (ref 21–32)
CREAT SERPL-MCNC: 0.5 MG/DL (ref 0.6–1.3)
GFR SERPL CREATININE-BSD FRML MDRD: 124 ML/MIN/1.73SQ M
GLUCOSE SERPL-MCNC: 120 MG/DL (ref 65–140)
HCO3 BLDA-SCNC: 26.1 MMOL/L (ref 22–28)
NON VENT ROOM AIR: ABNORMAL %
O2 CT BLDA-SCNC: 18.5 ML/DL (ref 16–23)
OTHER FIO2: ABNORMAL %
OXYHGB MFR BLDA: 95.6 % (ref 94–97)
PCO2 BLDA: 47 MM HG (ref 36–44)
PH BLDA: 7.36 [PH] (ref 7.35–7.45)
PHENOBARB SERPL-MCNC: 16 UG/ML (ref 15–40)
PO2 BLDA: 85.9 MM HG (ref 75–129)
POTASSIUM SERPL-SCNC: 4.6 MMOL/L (ref 3.5–5.3)
PROT SERPL-MCNC: 6.1 G/DL (ref 6.4–8.2)
SODIUM SERPL-SCNC: 144 MMOL/L (ref 136–145)
SPECIMEN SOURCE: ABNORMAL
VALPROATE SERPL-MCNC: 56 UG/ML (ref 50–100)

## 2018-08-16 PROCEDURE — 36600 WITHDRAWAL OF ARTERIAL BLOOD: CPT

## 2018-08-16 PROCEDURE — 94660 CPAP INITIATION&MGMT: CPT

## 2018-08-16 PROCEDURE — 80164 ASSAY DIPROPYLACETIC ACD TOT: CPT | Performed by: INTERNAL MEDICINE

## 2018-08-16 PROCEDURE — 94664 DEMO&/EVAL PT USE INHALER: CPT

## 2018-08-16 PROCEDURE — 82805 BLOOD GASES W/O2 SATURATION: CPT | Performed by: INTERNAL MEDICINE

## 2018-08-16 PROCEDURE — 80165 DIPROPYLACETIC ACID FREE: CPT | Performed by: INTERNAL MEDICINE

## 2018-08-16 PROCEDURE — 99233 SBSQ HOSP IP/OBS HIGH 50: CPT | Performed by: INTERNAL MEDICINE

## 2018-08-16 PROCEDURE — 94760 N-INVAS EAR/PLS OXIMETRY 1: CPT

## 2018-08-16 PROCEDURE — 80345 DRUG SCREENING BARBITURATES: CPT | Performed by: INTERNAL MEDICINE

## 2018-08-16 PROCEDURE — 80184 ASSAY OF PHENOBARBITAL: CPT | Performed by: INTERNAL MEDICINE

## 2018-08-16 PROCEDURE — 80053 COMPREHEN METABOLIC PANEL: CPT | Performed by: INTERNAL MEDICINE

## 2018-08-16 PROCEDURE — 94640 AIRWAY INHALATION TREATMENT: CPT

## 2018-08-16 PROCEDURE — 92610 EVALUATE SWALLOWING FUNCTION: CPT | Performed by: SPEECH-LANGUAGE PATHOLOGIST

## 2018-08-16 PROCEDURE — 82140 ASSAY OF AMMONIA: CPT | Performed by: INTERNAL MEDICINE

## 2018-08-16 RX ORDER — METOCLOPRAMIDE HYDROCHLORIDE 5 MG/ML
5 INJECTION INTRAMUSCULAR; INTRAVENOUS EVERY 6 HOURS SCHEDULED
Status: DISCONTINUED | OUTPATIENT
Start: 2018-08-16 | End: 2018-08-16

## 2018-08-16 RX ADMIN — TOPIRAMATE 250 MG: 100 TABLET, FILM COATED ORAL at 09:55

## 2018-08-16 RX ADMIN — Medication 250 MG: at 17:32

## 2018-08-16 RX ADMIN — WHITE PETROLATUM MINERAL OIL 1 APPLICATION: 150; 830 OINTMENT OPHTHALMIC at 09:47

## 2018-08-16 RX ADMIN — LEVETIRACETAM 1000 MG: 100 SOLUTION ORAL at 21:54

## 2018-08-16 RX ADMIN — ACETAMINOPHEN 650 MG: 325 TABLET, FILM COATED ORAL at 02:32

## 2018-08-16 RX ADMIN — VALPROIC ACID 250 MG: 500 SOLUTION ORAL at 02:32

## 2018-08-16 RX ADMIN — TOPIRAMATE 250 MG: 100 TABLET, FILM COATED ORAL at 20:38

## 2018-08-16 RX ADMIN — WHITE PETROLATUM MINERAL OIL 1 APPLICATION: 150; 830 OINTMENT OPHTHALMIC at 17:32

## 2018-08-16 RX ADMIN — ENOXAPARIN SODIUM 40 MG: 40 INJECTION, SOLUTION INTRAVENOUS; SUBCUTANEOUS at 09:47

## 2018-08-16 RX ADMIN — POTASSIUM CHLORIDE 20 MEQ: 20 SOLUTION ORAL at 09:49

## 2018-08-16 RX ADMIN — LEVOCARNITINE 500 MG: 1 SOLUTION ORAL at 06:28

## 2018-08-16 RX ADMIN — VALPROIC ACID 250 MG: 500 SOLUTION ORAL at 20:37

## 2018-08-16 RX ADMIN — VALPROIC ACID 250 MG: 500 SOLUTION ORAL at 14:27

## 2018-08-16 RX ADMIN — LEVETIRACETAM 1000 MG: 100 SOLUTION ORAL at 09:49

## 2018-08-16 RX ADMIN — LEVOCARNITINE 500 MG: 1 SOLUTION ORAL at 21:53

## 2018-08-16 RX ADMIN — WHITE PETROLATUM MINERAL OIL 1 APPLICATION: 150; 830 OINTMENT OPHTHALMIC at 21:46

## 2018-08-16 RX ADMIN — Medication 250 MG: at 09:53

## 2018-08-16 RX ADMIN — RUFINAMIDE 1200 MG: 200 TABLET, FILM COATED ORAL at 09:51

## 2018-08-16 RX ADMIN — LEVOCARNITINE 500 MG: 1 SOLUTION ORAL at 14:27

## 2018-08-16 RX ADMIN — LACTULOSE 45 G: 10 SOLUTION ORAL at 09:47

## 2018-08-16 RX ADMIN — ACETAMINOPHEN 650 MG: 325 TABLET, FILM COATED ORAL at 22:54

## 2018-08-16 RX ADMIN — ALBUTEROL SULFATE 2.5 MG: 2.5 SOLUTION RESPIRATORY (INHALATION) at 00:26

## 2018-08-16 RX ADMIN — RIFAXIMIN 550 MG: 550 TABLET ORAL at 17:32

## 2018-08-16 RX ADMIN — LACTULOSE 45 G: 10 SOLUTION ORAL at 17:28

## 2018-08-16 RX ADMIN — PHENOBARBITAL 15.2 MG: 20 LIQUID ORAL at 21:53

## 2018-08-16 RX ADMIN — LACTULOSE 45 G: 10 SOLUTION ORAL at 21:52

## 2018-08-16 RX ADMIN — RUFINAMIDE 1200 MG: 200 TABLET, FILM COATED ORAL at 17:32

## 2018-08-16 RX ADMIN — VALPROIC ACID 250 MG: 500 SOLUTION ORAL at 10:05

## 2018-08-16 NOTE — ASSESSMENT & PLAN NOTE
Continue current anti epileptic drug regimen, suspect that patient missed several doses due to vomiting over the weekend  According to the nursing home patient had been doing  well on her current regimen for quite a few months  Case discussed today with patient's primary neurologist, will check a total, free, bound phenobarbital level  Check repeat Depakote level, check total phenobarbital level  Patient is actually being under dosed on her phenobarbital and Depakote currently, this was specifically discussed with Neurology they wish to continue with current dosing, follow-up repeat levels

## 2018-08-16 NOTE — PROGRESS NOTES
Progress Note - Zbigniew Fan 1980, 41 y o  female MRN: [id number]    Unit/Bed#: 406-01 Encounter: [id number]    Primary Care Provider: Benita Ledezma DO   Date and time admitted to hospital: 8/13/2018  4:00 AM        * Acute encephalopathy   Assessment & Plan     Patient with altered mental status compared to baseline per her nursing facility, There was a question of possible underlying pneumonia, procalcitonin level is low, will discontinue antibiotics and continue monitoring, follow-up cultures  Possible underlying viral syndrome, patient's acute encephalopathy may be due to the fact that she had nausea and vomiting prior to admission which interrupted her seizure medication  Continue supportive care, due to persistently elevated CO2 level, will trial Vapotherm  Acute respiratory failure with hypercapnia (HCC)   Assessment & Plan    Elevate head of bed, continue supportive care, will consider BiPAP if patient has worsening hypercapnia, respiratory status should improve with supportive care including restarting tube feeds, continuous monitoring and assurance that patient is taking prescribed antiseizure drugs  Will initiate available from as above, will correct hypercapnia and reassess mental status  Intractable epilepsy without status epilepticus [facility])   Assessment & Plan    Continue current anti epileptic drug regimen, suspect that patient missed several doses due to vomiting over the weekend  According to the nursing home patient had been doing  well on her current regimen for quite a few months  Case discussed today with patient's primary neurologist, will check a total, free, bound phenobarbital level  Check repeat Depakote level, check total phenobarbital level  Patient is actually being under dosed on her phenobarbital and Depakote currently, this was specifically discussed with Neurology they wish to continue with current dosing, follow-up repeat levels  VTE Pharmacologic Prophylaxis:     Patient Centered Rounds: I have performed bedside rounds with nursing staff today  Discussions with Specialists or Other Care Team Provider:  Plan of care discussed extensively with Neurology via telephone  Time Spent for Care: 45 minutes  More than 50% of total time spent on counseling and coordination of care as described above  Current Length of Stay: 3 day(s)    Current Patient Status: Inpatient   Certification Statement: The patient will continue to require additional inpatient hospital stay due to Continued altered mental status  Code Status: Level 1 - Full Code      Subjective:   No pain    Objective:     Vitals:   Temp (24hrs), Av 4 °F (36 3 °C), Min:97 2 °F (36 2 °C), Max:97 5 °F (36 4 °C)    HR:  [74-88] 77  Resp:  [18] 18  BP: ()/(58-73) 101/65  SpO2:  [96 %-100 %] 100 %  Body mass index is 21 21 kg/m²  Input and Output Summary (last 24 hours): Intake/Output Summary (Last 24 hours) at 18 1517  Last data filed at 18 1426   Gross per 24 hour   Intake              500 ml   Output                0 ml   Net              500 ml       Physical Exam:     Physical Exam   Constitutional: She appears well-developed and well-nourished  No distress  Cardiovascular: Normal rate  Pulmonary/Chest: Effort normal and breath sounds normal  No respiratory distress  She has no wheezes  Abdominal: Soft  Bowel sounds are normal  She exhibits no distension  There is no tenderness  Neurological:   Patient is somnolent  Skin: She is not diaphoretic  Nursing note and vitals reviewed          Additional Data:     Labs:      Results from last 7 days  Lab Units 08/15/18  0535   WBC Thousand/uL 9 87   HEMOGLOBIN g/dL 12 9   HEMATOCRIT % 39 0   PLATELETS Thousands/uL 206   NEUTROS PCT % 58   LYMPHS PCT % 26   MONOS PCT % 12   EOS PCT % 2       Results from last 7 days  Lab Units 18  1205   SODIUM mmol/L 144   POTASSIUM mmol/L 4 6 CHLORIDE mmol/L 111*   CO2 mmol/L 26   BUN mg/dL 19   CREATININE mg/dL 0 50*   CALCIUM mg/dL 8 5   TOTAL PROTEIN g/dL 6 1*   BILIRUBIN TOTAL mg/dL 0 40   ALK PHOS U/L 146*   ALT U/L 251*   AST U/L 176*   GLUCOSE RANDOM mg/dL 120       Results from last 7 days  Lab Units 08/15/18  0535   INR  1 31*       * I Have Reviewed All Lab Data Listed Above  * Additional Pertinent Lab Tests Reviewed: All [facility] Admission Reviewed        Recent Cultures (last 7 days):       Results from last 7 days  Lab Units 08/13/18  1736 08/13/18  0424   BLOOD CULTURE   --  No Growth at 72 hrs  No Growth at 72 hrs     LEGIONELLA URINARY ANTIGEN  Negative  --        Last 24 Hours Medication List:     Current Facility-Administered Medications:  acetaminophen 650 mg Per G Tube Q6H PRN Brenda Simpson, DO   albuterol 2 5 mg Nebulization Q6H PRN Brenda Simpson, DO   artificial tear 1 application Ophthalmic TID Brenda Simpson, DO   enoxaparin 40 mg Subcutaneous Daily Tristan Harvey, DO   lactulose 45 g Per G Tube TID Brenda Simpson, DO   levETIRAcetam 1,000 mg Per G Tube Q12H [facility] Brenda Simpson, DO   levOCARNitine 500 mg Per G Tube TID Brenda Simpson, DO   magnesium hydroxide 30 mL Per G Tube Daily PRN Brenda Simpson, DO   perampanel 10 mg Per NG Tube HS Brenda Simpson, DO   PHENobarbital 15 2 mg Per G Tube HS [facility], DO   potassium chloride 20 mEq Per G Tube Daily Brenda Simpson, DO   rifaximin 550 mg Oral Q12H [facility] Brenda Simpson, DO   rufinamide 1,200 mg Per G Tube BID Brenda Simpson, DO   saccharomyces boulardii 250 mg Oral BID Brenda Simpson, DO   sodium chloride (PF) 3 mL Intravenous PRN Emile Gudino MD   topiramate 250 mg Per G Tube BID Brenda Simpson, DO   Valproic Acid 250 mg Per G Tube Q6H Brenda Simpson DO        Today, Patient Was Seen By: Brenda Simpson DO

## 2018-08-16 NOTE — PROGRESS NOTES
Pt vomited mod amts  tan colored fluid x4  Unable to measure  Dr Sathya Cardenas made aware  HOB elevated 30 degrees

## 2018-08-16 NOTE — SPEECH THERAPY NOTE
Speech Language/Pathology  Speech/Language Pathology  Assessment    Patient Name: Angeline MINOR Date: 8/16/2018     Problem List  Patient Active Problem List   Diagnosis    Dysphagia    Acute encephalopathy    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with tonic seizures (HCC)    Lactic acidosis    Gastrostomy tube obstruction (HCC)    Excessive daytime sleepiness    Underweight    Intellectual disability    Hyperammonemia (HCC)    Acute DANIEL (middle ear effusion)    Macrocytic anemia    Hypokalemia    Acute respiratory failure with hypercapnia (HCC)    Hypernatremia    Osteoporosis    Onychomycosis    Lethargy    Hyperkeratosis    Elevated liver enzymes    ADHD, predominantly inattentive type    Intractable epilepsy without status epilepticus ([facility] [address] )     Past Medical History  Past Medical History:   Diagnosis Date    ADHD     Anoxic brain damage (HCC)     Autistic disorder     Hyperammonemia (HCC)     Hyperkeratosis     Hypotension     Intellectual disability     Intellectual disability     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with tonic seizures (HCC)     Lethargy     Liver enzyme elevation     Onychomycosis     Osteoporosis     Osteoporosis     Psychiatric disorder     anxiety     Past Surgical History  Past Surgical History:   Procedure Laterality Date    ABDOMINAL SURGERY      CARDIAC PACEMAKER PLACEMENT      JEJUNOSTOMY FEEDING TUBE      PEG TUBE PLACEMENT          08/16/18 1123   Swallow Information   Current Risks for Dysphagia & Aspiration Cognitive deficit; Reduced alertnes   Current Symptoms/Concerns HX Dysphagia  (PEG tube; but takes PO at SNF)   Current Diet NPO  (PEG tube)   Baseline Diet Tube feedings;Regular; Thin liquids  (finger foods)   Baseline Assessment   Behavior/Cognition Alert; Cooperative; Doesn't follow directions   Speech/Language Status non verbal baseline; unable to follow commands   Patient Positioning Upright in bed   Swallow Mechanism Exam   Dentition Adequate Consistencies Assessed and Performance   Materials Admnistered Thin liquid;Puree/Level 1   Materials Adminstered Comment 1/2 tsp puree; 2 small sips thin water   Oral Stage Moderate impaired; With limited texture; With limited amount   Oral Stage Comment Patient with reduced oral control, bolus formation and AP transfer  Risk for aspiration  Phargngeal Stage Mild impaired;Aspiration risk; With limited texture; With limited amount   Pharyngeal Stage Comment Patient with audible swallow and mild delay in swallow initiation  Cough response with puree  Swallow Mechanics Mild delayed;Good Larygneal rise;Aspiration risk   Summary   Swallow Summary Patient was alert today for evaluation  She is unable to follow commands for complete oral motor exam; but she was receptive to 2 small cup sips of thin water and 1/2 tsp puree  Patient had cough response after puree  Patient has mild delay in swallow initiation and moderate oral dysphagia with risk of premature leakage  Patient had no overt s/s with thin water  A few minutes after these trials were discontinued due to risk with PO intake; the patient vomitted 5 times yellow fluid of high volume  She was in upright position already from evalution; PCA and SLP were present  RN notified and patient without coughing/choking and alert and awake  PCAs cleaning/bathing patient right away  Continue without PO intake due to increased risk for aspiration and patient's recent vomiting  Recommendations   Risk for Aspiration Moderate   Recommendations NPO   Diet Solid Recommendation (PEG tube)   Diet Liquid Recommendation No liquids   Recommended Form of Meds Feeding tube/IV   Further Evaluations Gastroenterology; Dietician   Results Reviewed with RN

## 2018-08-17 ENCOUNTER — HOSPITAL ENCOUNTER (INPATIENT)
Facility: HOSPITAL | Age: 38
LOS: 6 days | Discharge: NURSING, REHAB OR LONG-TERM CARE | DRG: 441 | End: 2018-08-24
Attending: INTERNAL MEDICINE | Admitting: INTERNAL MEDICINE
Payer: MEDICARE

## 2018-08-17 VITALS
DIASTOLIC BLOOD PRESSURE: 61 MMHG | TEMPERATURE: 97.3 F | HEIGHT: 57 IN | RESPIRATION RATE: 18 BRPM | SYSTOLIC BLOOD PRESSURE: 91 MMHG | WEIGHT: 98 LBS | BODY MASS INDEX: 21.14 KG/M2 | HEART RATE: 70 BPM | OXYGEN SATURATION: 97 %

## 2018-08-17 DIAGNOSIS — G93.40 ACUTE ENCEPHALOPATHY: ICD-10-CM

## 2018-08-17 DIAGNOSIS — G40.419 OTHER GENERALIZED EPILEPSY, INTRACTABLE, WITHOUT STATUS EPILEPTICUS (HCC): ICD-10-CM

## 2018-08-17 DIAGNOSIS — K72.00 ACUTE HEPATIC ENCEPHALOPATHY: ICD-10-CM

## 2018-08-17 DIAGNOSIS — R40.0 SOMNOLENCE: Primary | ICD-10-CM

## 2018-08-17 DIAGNOSIS — G40.812 LENNOX-GASTAUT SYNDROME WITH TONIC SEIZURES (HCC): ICD-10-CM

## 2018-08-17 LAB
ALBUMIN SERPL BCP-MCNC: 2.3 G/DL (ref 3.5–5)
ALP SERPL-CCNC: 145 U/L (ref 46–116)
ALT SERPL W P-5'-P-CCNC: 229 U/L (ref 12–78)
AMMONIA PLAS-SCNC: 71 UMOL/L (ref 11–35)
ANION GAP SERPL CALCULATED.3IONS-SCNC: 8 MMOL/L (ref 4–13)
AST SERPL W P-5'-P-CCNC: 153 U/L (ref 5–45)
BASOPHILS # BLD AUTO: 0.05 THOUSANDS/ΜL (ref 0–0.1)
BASOPHILS NFR BLD AUTO: 1 % (ref 0–1)
BILIRUB SERPL-MCNC: 0.4 MG/DL (ref 0.2–1)
BUN SERPL-MCNC: 17 MG/DL (ref 5–25)
CALCIUM SERPL-MCNC: 8.5 MG/DL (ref 8.3–10.1)
CHLORIDE SERPL-SCNC: 110 MMOL/L (ref 100–108)
CO2 SERPL-SCNC: 25 MMOL/L (ref 21–32)
CREAT SERPL-MCNC: 0.61 MG/DL (ref 0.6–1.3)
EOSINOPHIL # BLD AUTO: 0.22 THOUSAND/ΜL (ref 0–0.61)
EOSINOPHIL NFR BLD AUTO: 3 % (ref 0–6)
ERYTHROCYTE [DISTWIDTH] IN BLOOD BY AUTOMATED COUNT: 15.1 % (ref 11.6–15.1)
GFR SERPL CREATININE-BSD FRML MDRD: 116 ML/MIN/1.73SQ M
GLUCOSE SERPL-MCNC: 194 MG/DL (ref 65–140)
HCT VFR BLD AUTO: 39.7 % (ref 34.8–46.1)
HGB BLD-MCNC: 12.9 G/DL (ref 11.5–15.4)
IMM GRANULOCYTES # BLD AUTO: 0.02 THOUSAND/UL (ref 0–0.2)
IMM GRANULOCYTES NFR BLD AUTO: 0 % (ref 0–2)
LYMPHOCYTES # BLD AUTO: 2.23 THOUSANDS/ΜL (ref 0.6–4.47)
LYMPHOCYTES NFR BLD AUTO: 27 % (ref 14–44)
MAGNESIUM SERPL-MCNC: 2 MG/DL (ref 1.6–2.6)
MCH RBC QN AUTO: 32.7 PG (ref 26.8–34.3)
MCHC RBC AUTO-ENTMCNC: 32.5 G/DL (ref 31.4–37.4)
MCV RBC AUTO: 101 FL (ref 82–98)
MONOCYTES # BLD AUTO: 0.9 THOUSAND/ΜL (ref 0.17–1.22)
MONOCYTES NFR BLD AUTO: 11 % (ref 4–12)
NEUTROPHILS # BLD AUTO: 4.77 THOUSANDS/ΜL (ref 1.85–7.62)
NEUTS SEG NFR BLD AUTO: 58 % (ref 43–75)
NRBC BLD AUTO-RTO: 0 /100 WBCS
PHOSPHATE SERPL-MCNC: 3.6 MG/DL (ref 2.7–4.5)
PLATELET # BLD AUTO: 233 THOUSANDS/UL (ref 149–390)
PMV BLD AUTO: 11.3 FL (ref 8.9–12.7)
POTASSIUM SERPL-SCNC: 3.8 MMOL/L (ref 3.5–5.3)
PROT SERPL-MCNC: 6.2 G/DL (ref 6.4–8.2)
RBC # BLD AUTO: 3.95 MILLION/UL (ref 3.81–5.12)
SODIUM SERPL-SCNC: 143 MMOL/L (ref 136–145)
WBC # BLD AUTO: 8.19 THOUSAND/UL (ref 4.31–10.16)

## 2018-08-17 PROCEDURE — 80053 COMPREHEN METABOLIC PANEL: CPT | Performed by: INTERNAL MEDICINE

## 2018-08-17 PROCEDURE — 82140 ASSAY OF AMMONIA: CPT | Performed by: INTERNAL MEDICINE

## 2018-08-17 PROCEDURE — 99222 1ST HOSP IP/OBS MODERATE 55: CPT | Performed by: INTERNAL MEDICINE

## 2018-08-17 PROCEDURE — 94660 CPAP INITIATION&MGMT: CPT

## 2018-08-17 PROCEDURE — 83735 ASSAY OF MAGNESIUM: CPT | Performed by: INTERNAL MEDICINE

## 2018-08-17 PROCEDURE — 85025 COMPLETE CBC W/AUTO DIFF WBC: CPT | Performed by: INTERNAL MEDICINE

## 2018-08-17 PROCEDURE — 94760 N-INVAS EAR/PLS OXIMETRY 1: CPT

## 2018-08-17 PROCEDURE — 84100 ASSAY OF PHOSPHORUS: CPT | Performed by: INTERNAL MEDICINE

## 2018-08-17 PROCEDURE — 99239 HOSP IP/OBS DSCHRG MGMT >30: CPT | Performed by: INTERNAL MEDICINE

## 2018-08-17 RX ORDER — VALPROIC ACID 250 MG/5ML
250 SOLUTION ORAL EVERY 8 HOURS SCHEDULED
Status: DISCONTINUED | OUTPATIENT
Start: 2018-08-17 | End: 2018-08-17 | Stop reason: HOSPADM

## 2018-08-17 RX ORDER — PHENOBARBITAL 20 MG/5ML
20 ELIXIR ORAL
Status: DISCONTINUED | OUTPATIENT
Start: 2018-08-17 | End: 2018-08-17 | Stop reason: HOSPADM

## 2018-08-17 RX ADMIN — RUFINAMIDE 1200 MG: 200 TABLET, FILM COATED ORAL at 09:24

## 2018-08-17 RX ADMIN — VALPROIC ACID 250 MG: 500 SOLUTION ORAL at 02:45

## 2018-08-17 RX ADMIN — WHITE PETROLATUM MINERAL OIL 1 APPLICATION: 150; 830 OINTMENT OPHTHALMIC at 18:58

## 2018-08-17 RX ADMIN — VALPROIC ACID 250 MG: 500 SOLUTION ORAL at 09:30

## 2018-08-17 RX ADMIN — ENOXAPARIN SODIUM 40 MG: 40 INJECTION, SOLUTION INTRAVENOUS; SUBCUTANEOUS at 09:19

## 2018-08-17 RX ADMIN — RUFINAMIDE 1200 MG: 200 TABLET, FILM COATED ORAL at 18:53

## 2018-08-17 RX ADMIN — LEVETIRACETAM 1000 MG: 100 SOLUTION ORAL at 09:21

## 2018-08-17 RX ADMIN — RIFAXIMIN 550 MG: 550 TABLET ORAL at 09:22

## 2018-08-17 RX ADMIN — WHITE PETROLATUM MINERAL OIL 1 APPLICATION: 150; 830 OINTMENT OPHTHALMIC at 09:18

## 2018-08-17 RX ADMIN — POTASSIUM CHLORIDE 20 MEQ: 20 SOLUTION ORAL at 09:21

## 2018-08-17 RX ADMIN — VALPROIC ACID 250 MG: 500 SOLUTION ORAL at 02:50

## 2018-08-17 RX ADMIN — TOPIRAMATE 250 MG: 100 TABLET, FILM COATED ORAL at 09:28

## 2018-08-17 RX ADMIN — VALPROIC ACID 250 MG: 500 SOLUTION ORAL at 18:50

## 2018-08-17 RX ADMIN — LEVOCARNITINE 500 MG: 1 SOLUTION ORAL at 14:41

## 2018-08-17 RX ADMIN — LACTULOSE 45 G: 10 SOLUTION ORAL at 09:19

## 2018-08-17 RX ADMIN — Medication 250 MG: at 09:27

## 2018-08-17 RX ADMIN — Medication 250 MG: at 18:49

## 2018-08-17 RX ADMIN — LEVOCARNITINE 500 MG: 1 SOLUTION ORAL at 05:07

## 2018-08-17 NOTE — ASSESSMENT & PLAN NOTE
Patient with altered mental status compared to baseline per her nursing facility, There was a question of possible underlying pneumonia  On admission, procalcitonin level is low,  Antibiotics were discontinued  Possible underlying viral syndrome, patient's acute encephalopathy may be due to the fact that she had nausea and vomiting prior to admission which interrupted her seizure medication  Continue supportive care, due to persistently elevated CO2 level, will trial Vapotherm

## 2018-08-17 NOTE — ASSESSMENT & PLAN NOTE
Continue lactulose , follow up repeat ammonia level tomorrow, mental status was slightly improved yesterday, patient continues with increased somnolence  GI consult is pending  Patient is multiple loose bowel movements

## 2018-08-17 NOTE — CASE MANAGEMENT
Continued Stay Review    Date: 8/17/2018    Vital Signs: /66 (BP Location: Left arm)   Pulse 73   Temp (!) 97 3 °F (36 3 °C) (Temporal)   Resp 18   Ht 4' 9" (1 448 m)   Wt 44 5 kg (98 lb)   LMP  (LMP Unknown)   SpO2 96%   BMI 21 21 kg/m²     Medications:   Scheduled Meds:   Current Facility-Administered Medications:  acetaminophen 650 mg Per G Tube Q6H PRN [place], DO   albuterol 2 5 mg Nebulization Q6H PRN [place], DO   artificial tear 1 application Ophthalmic TID [place], DO   enoxaparin 40 mg Subcutaneous Daily Tristan Candice, DO   lactulose 45 g Per G Tube TID [place], DO   levETIRAcetam 1,000 mg Per G Tube Q12H [facility] [place], DO   levOCARNitine 500 mg Per G Tube TID [place], DO   magnesium hydroxide 30 mL Per G Tube Daily PRN [place], DO   perampanel 10 mg Per NG Tube HS [place], DO   PHENobarbital 20 mg Per G Tube HS [place], DO   potassium chloride 20 mEq Per G Tube Daily [place], DO   rifaximin 550 mg Oral Q12H [facility] [place], DO   rufinamide 1,200 mg Per G Tube BID [place], DO   saccharomyces boulardii 250 mg Oral BID [place], DO   sodium chloride (PF) 3 mL Intravenous PRN Vel Rosario MD   topiramate 250 mg Per G Tube BID [place], DO   valproic acid 250 mg Per G Tube Q6H [place], DO     Continuous Infusions:    PRN Meds:   acetaminophen    albuterol    magnesium hydroxide    Insert peripheral IV **AND** sodium chloride (PF)    Abnormal Labs/Diagnostic Results: cl 110--bs 194--ast 153--alt 229--alk phos 145--t   Pro 6 2--alb 2 3  ammonia 71  ruq u/s----unremarkable    Age/Sex: 41 y o  female     Assessment/Plan:   Acute encephalopathy   Assessment & Plan      Patient with altered mental status compared to baseline per her nursing facility, There was a question of possible underlying pneumonia, procalcitonin level is low, will discontinue antibiotics and continue monitoring, follow-up cultures     Possible underlying viral syndrome, patient's acute encephalopathy may be due to the fact that she had nausea and vomiting prior to admission which interrupted her seizure medication      Continue supportive care, due to persistently elevated CO2 level, will trial Vapotherm           Acute respiratory failure with hypercapnia (HCC)   Assessment & Plan     Elevate head of bed, continue supportive care, will consider BiPAP if patient has worsening hypercapnia, respiratory status should improve with supportive care including restarting tube feeds, continuous monitoring and assurance that patient is taking prescribed antiseizure drugs      Will initiate available from as above, will correct hypercapnia and reassess mental status           Intractable epilepsy without status epilepticus ([facility] [address] )   Assessment & Plan     Continue current anti epileptic drug regimen, suspect that patient missed several doses due to vomiting over the weekend      According to the nursing home patient had been doing  well on her current regimen for quite a few months      Case discussed today with patient's primary neurologist, will check a total, free, bound phenobarbital level        Check repeat Depakote level, check total phenobarbital level      Patient is actually being under dosed on her phenobarbital and Depakote currently, this was specifically discussed with Neurology they wish to continue with current dosing, follow-up repeat levels            The patient will continue to require additional inpatient hospital stay due to Continued altered mental status          Discharge Plan: back to [facility]--- 15 day ma hold

## 2018-08-17 NOTE — ASSESSMENT & PLAN NOTE
Elevate head of bed, continue supportive care,   Patient was treated Vapotherm, will monitor pulse oximetry, consider repeat ABG in 24 to 48 hours  mild elevation of CO2 level should not be contributing to her current mental status  Hypercapnia was actually corrected with continued acute encephalopathy

## 2018-08-17 NOTE — ASSESSMENT & PLAN NOTE
Case has been discussed with patient's epileptologist, Dr Arthur Barthel,  Depakote dosing decreased today 8/17/2018 to 250 mg t i d  According to the nursing home patient had been doing  well on her current regimen for quite a few months  Case discussed today with patient's primary neurologist, will check a total, free, bound phenobarbital level,  This specific lab is a send out to [facility], was sent on 8/16/2018, results pending  Depakote level and phenobarbital level checked on 8/16/2018  please note that patient was previously on 60 mg of phenobarbital daily, dosing has been decreased to 20 mg daily, this dosing was discussed with her primary epileptologist via   Telephone

## 2018-08-17 NOTE — ASSESSMENT & PLAN NOTE
Patient had 1 episode of vomiting on august 16 2018 in the hospital, otherwise tolerating tube feeds

## 2018-08-17 NOTE — PROGRESS NOTES
Attempted to give pt bolus feed via PEG tube  Meds given first, followed by tube feed  Pt only tolerated 125ml feed and then she started burping  Flushed tube with water  Did not finish entire ordered feed volume due to episodes of vomiting earlier today  Pt received a total of 275 ml  Which included 125 ml feed and 150 ml meds/water flush  HOB remains elevated, 1:1 sitter at bedside, will continue to monitor

## 2018-08-17 NOTE — SOCIAL WORK
Discussed the POC with the interdisciplinary team   Pt has been evaluated by MD and is stable to be discharged  TC to pt mother to notify of same  Mother is agreeable to discharge back to [facility]  TC to [place], spoke to Jace Butterfield and arrangements made for 5pm  via ambulance  Notified RN of same  TC to [facility], spoke to Karina Payne of pt discharge back to the facility

## 2018-08-17 NOTE — DISCHARGE SUMMARY
Discharge- Ronn [facility] COTTAGE 1980, 41 y o  female MRN: [id number]    Unit/Bed#: 406-01 Encounter: [id number]    Primary Care Provider: Modesto Brittle, DO   Date and time admitted to hospital: 8/13/2018  4:00 AM        * Acute encephalopathy   Assessment & Plan     Patient with altered mental status compared to baseline per her nursing facility, There was a question of possible underlying pneumonia  On admission, procalcitonin level is low,  Antibiotics were discontinued  Possible underlying viral syndrome, patient's acute encephalopathy may be due to the fact that she had nausea and vomiting prior to admission which interrupted her seizure medication  Continue supportive care, due to persistently elevated CO2 level, will trial Vapotherm  Acute respiratory failure with hypercapnia (HCC)   Assessment & Plan    Elevate head of bed, continue supportive care,   Patient was treated Vapotherm, will monitor pulse oximetry, consider repeat ABG in 24 to 48 hours  mild elevation of CO2 level should not be contributing to her current mental status  Hypercapnia was actually corrected with continued acute encephalopathy  Hyperammonemia (HCC)   Assessment & Plan    Continue lactulose , follow up repeat ammonia level tomorrow, mental status was slightly improved yesterday, patient continues with increased somnolence  GI consult is pending  Patient is multiple loose bowel movements  Intractable epilepsy without status epilepticus [facility])   Assessment & Plan      Case has been discussed with patient's epileptologist, Dr Oar Watts,  Depakote dosing decreased today 8/17/2018 to 250 mg t i d  According to the nursing home patient had been doing  well on her current regimen for quite a few months      Case discussed today with patient's primary neurologist, will check a total, free, bound phenobarbital level,  This specific lab is a send out to [facility], was sent on 8/16/2018, results pending  Depakote level and phenobarbital level checked on 8/16/2018  please note that patient was previously on 60 mg of phenobarbital daily, dosing has been decreased to 20 mg daily, this dosing was discussed with her primary epileptologist via   Telephone  Elevated liver enzymes   Assessment & Plan     Case discussed with Gastroenterology, official recommendations are pending  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with tonic seizures (HCC)   Assessment & Plan      No acute intervention  Dysphagia   Assessment & Plan        Patient had 1 episode of vomiting on august 16 2018 in the hospital, otherwise tolerating tube feeds  Discharging Physician / Practitioner: Lanny Hancock DO  PCP: Cesar Zavala DO  Admission Date:   Admission Orders     Ordered        08/13/18 0855  Inpatient Admission (expected length of stay for this patient is greater than two midnights)  Once             Discharge Date: 08/17/18    Resolved Problems  Date Reviewed: 8/17/2018    None          Consultations During Hospital Stay:  ·   Case reviewed with epileptologist    Reason for Admission:  Acute encephalopathy    Hospital Course:     Richie Tabor is a 41 y o  female patient who originally presented to the hospital on 8/13/2018 due to  Acute encephalopathy  Please see above list of diagnoses and related plan for additional information       Condition at Discharge: fair     Discharge Day Visit / Exam:     Subjective:     Patient cannot give any reliable information, case has been discussed with the nursing aides who have been on continuous one-to-one since admission, patient displayed 1 outburst of head hitting behavior, otherwise has been extremely somnolent, per the patient's nursing home patient can independently feed herself at baseline, she usually is very resistant to care including blood work, here at the hospital she continues to be somnolent does not withdrawal to pain /specifically does not withdrawal to ABG sticks          Vitals: Blood Pressure: 91/61 (08/17/18 1554)  Pulse: 70 (08/17/18 1554)  Temperature: (!) 97 3 °F (36 3 °C) (08/17/18 1554)  Temp Source: Temporal (08/17/18 1554)  Respirations: 18 (08/17/18 1554)  Height: 4' 9" (144 8 cm) (08/14/18 1553)  Weight - Scale: 44 5 kg (98 lb) (08/14/18 1552)  SpO2: 97 % (08/17/18 1554)  Exam:   Physical Exam    Somnolent , no acute distress, lungs are clear to auscultation bilaterally abdomen is soft nontender, positive bowel sounds, no skin lesions on the body, patient has significant acne present on her face      Disposition:     [facility]  [address] Transfer to [facility] to [facility] SNF:   · Not Applicable to this Patient - Not Applicable to this Patient

## 2018-08-17 NOTE — CONSULTS
Consultation - [address] [facility]  Siomara Jason [facility] COTTAGE 41 y o  female MRN: [id number]  Unit/Bed#: 406-01 Encounter: [id number]        ASSESSMENT/PLAN:   1  Elevated LFTs  2  Elevated ammonia level   -She has a long history of difficult to treat seizures, despite an aggressive antiepileptic regimen  Per the neurology notes, she has intractable epilepsy and her seizure medications are unable to be discontinued  - She has a chronically elevated ammonia level, which is thought to be secondary to her Valproate  She is given lactulose 20g TID as well as levocarnitine for this  She also takes Xifaxin  -RUQ ultrasound was unremarkable    -Her INR is elevated at 1 31, this is stable since 8/13  Her INR level was normal last year, with the exception of a mild elevation in Feb    -Her LFTs are elevated in a hepatocellular pattern with ast 153, alt 229, ap 145 and total bilirubin 0 4  These have been stable during her hospital admission and it appears her alk phos was elevated in March at 128    -Several of her antiseizure medications can cause elevation of LFTs  She was most recently started on phenobarbital in May  Her other medications include topiramate, perampenl, keppra, valproate and rufinamide    -Recommend full liver workup to evaluate for other cause of her elevated LFTs including viral hepatitis panel, serology for autoimmune hepatitis, PBC, Ethan's disease, hemochromatosis and alpha-1 antitrypsin deficiency  -  Continue to monitor LFTs and INR      Inpatient consult to gastroenterology  Consult performed by: aTwanda Arnold  Consult ordered by: Prashanth Collado          Reason for Consult / Principal Problem: Acute encephalopathy    HPI: Epifanio Kate is a 41y o  year old female with a PMH of epilepsy and status epilepticus resulting in anoxic brain injury s/p PEG placement presented to the hospital from the group home where she lives as the staff noted she was more lethargic recently   GI was asked to evaluate her for elevated LFTs  She does not speak so history is obtained from the chart and nursing staff  She has a long history of difficult to treat seizures, despite an aggressive antiepileptic regimen  She has a chronically elevated ammonia level, which is thought to be secondary to her Valproate  She is given lactulose 20g TID as well as levocarnitine for this  Review of Systems: as per HPI  Review of Systems   Unable to perform ROS: Patient nonverbal       Historical Information   Past Medical History:   Diagnosis Date    ADHD     Anoxic brain damage ([address] )     Autistic disorder     Hyperammonemia (HCC)     Hyperkeratosis     Hypotension     Intellectual disability     Intellectual disability     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with tonic seizures (HCC)     Lethargy     Liver enzyme elevation     Onychomycosis     Osteoporosis     Osteoporosis     Psychiatric disorder     anxiety     Past Surgical History:   Procedure Laterality Date    ABDOMINAL SURGERY      CARDIAC PACEMAKER PLACEMENT      JEJUNOSTOMY FEEDING TUBE      PEG TUBE PLACEMENT       Social History   History   Alcohol Use No     History   Drug Use No     History   Smoking Status    Never Smoker   Smokeless Tobacco    Never Used     History reviewed  No pertinent family history      Meds/Allergies     Prescriptions Prior to Admission   Medication    acetaminophen (TYLENOL) 325 mg tablet    bisacodyl (FLEET) 10 MG/30ML ENEM    lactulose 20 g/30 mL    levETIRAcetam (KEPPRA) 100 mg/mL oral solution    levOCARNitine (CARNITOR) 1 g/10 mL solution    MELATONIN PO    Multiple Vitamins-Minerals (CENTRUM SILVER PO)    perampanel (FYCOMPA) oral suspension    PHENobarbital 20 mg/5 mL elixir    potassium chloride 10 %    Probiotic Product (ALEXANDER-BID PROBIOTIC PO)    rufinamide (BANZEL) 400 mg tablet    Sennosides-Docusate Sodium (SENEXON-S PO)    topiramate (TOPAMAX) 200 MG tablet    topiramate (TOPAMAX) 50 MG tablet    Valproic Acid (DEPAKENE) 250 MG/5ML soln    White Petrolatum-Mineral Oil (SYSTANE NIGHTTIME) OINT    cloBAZam (ONFI) tablet    magnesium hydroxide (MILK OF MAGNESIA) 400 mg/5 mL oral suspension    ondansetron (ZOFRAN) 4 mg tablet    Sodium Phosphates (FLEET ENEMA RE)     Current Facility-Administered Medications   Medication Dose Route Frequency    acetaminophen (TYLENOL) tablet 650 mg  650 mg Per G Tube Q6H PRN    albuterol inhalation solution 2 5 mg  2 5 mg Nebulization Q6H PRN    artificial tear (LUBRIFRESH P M ) ophthalmic ointment 1 application  1 application Ophthalmic TID    enoxaparin (LOVENOX) subcutaneous injection 40 mg  40 mg Subcutaneous Daily    lactulose 20 g/30 mL oral solution 45 g  45 g Per G Tube TID    levETIRAcetam (KEPPRA) oral solution 1,000 mg  1,000 mg Per G Tube Q12H [address]    levOCARNitine (CARNITOR) oral solution 500 mg  500 mg Per G Tube TID    magnesium hydroxide (MILK OF MAGNESIA) 400 mg/5 mL oral suspension 30 mL  30 mL Per G Tube Daily PRN    perampanel (FYCOMPA) oral suspension 10 mg  10 mg Per NG Tube HS    PHENobarbital oral elixir 20 mg  20 mg Per G Tube HS    potassium chloride 10 % oral solution 20 mEq  20 mEq Per G Tube Daily    rifaximin (XIFAXAN) tablet 550 mg  550 mg Oral Q12H [address]    rufinamide (BANZEL) tablet 1,200 mg  1,200 mg Per G Tube BID    saccharomyces boulardii (FLORASTOR) capsule 250 mg  250 mg Oral BID    sodium chloride (PF) 0 9 % injection 3 mL  3 mL Intravenous PRN    topiramate (TOPAMAX) tablet 250 mg  250 mg Per G Tube BID    Valproic Acid (DEPAKENE) 250 MG/5ML oral soln 250 mg  250 mg Per G Tube Q6H       Allergies   Allergen Reactions    Felbamate        Objective     Blood pressure 100/66, pulse 73, temperature (!) 97 3 °F (36 3 °C), temperature source Temporal, resp  rate 18, height 4' 9" (1 448 m), weight 44 5 kg (98 lb), SpO2 96 %        Intake/Output Summary (Last 24 hours) at 08/17/18 1502  Last data filed at 08/17/18 7623   Gross per 24 hour   Intake             1235 ml   Output                0 ml   Net             1235 ml       PHYSICAL EXAM     Physical Exam   Constitutional: No distress  HENT:   Head: Normocephalic and atraumatic  Eyes: Right eye exhibits no discharge  Left eye exhibits no discharge  No scleral icterus  Neck: Neck supple  No tracheal deviation present  Cardiovascular: Normal rate, regular rhythm and normal heart sounds  Exam reveals no gallop and no friction rub  No murmur heard  Pulmonary/Chest: Effort normal and breath sounds normal  No respiratory distress  She has no wheezes  She has no rales  Abdominal: Soft  Bowel sounds are normal  She exhibits no distension and no mass  There is no tenderness  There is no rebound and no guarding  PEG tube in place, clean/dry/intact, no surrounding erythema, induration, drainage or excoration   Neurological: She is alert  Awake, responds to touch but does not speak   Skin: Skin is warm and dry     Psychiatric:   Unable to assess       Lab Results:   CBC: Lab Results   Component Value Date    WBC 8 19 08/17/2018    HGB 12 9 08/17/2018    HCT 39 7 08/17/2018     (H) 08/17/2018     08/17/2018    MCH 32 7 08/17/2018    MCHC 32 5 08/17/2018    RDW 15 1 08/17/2018    MPV 11 3 08/17/2018    NRBC 0 08/17/2018   ,   CMP: Lab Results   Component Value Date     08/17/2018    K 3 8 08/17/2018     (H) 08/17/2018    CO2 25 08/17/2018    ANIONGAP 8 08/17/2018    BUN 17 08/17/2018    CREATININE 0 61 08/17/2018    GLUCOSE 194 (H) 08/17/2018    CALCIUM 8 5 08/17/2018     (H) 08/17/2018     (H) 08/17/2018    ALKPHOS 145 (H) 08/17/2018    PROT 6 2 (L) 08/17/2018    BILITOT 0 40 08/17/2018    EGFR 116 08/17/2018   ,   Lipase: No results found for: LIPASE,  PT/INR: No results found for: PT, INR,   Troponin: No results found for: TROPONINI,   Magnesium: No results found for: MAG,   Phosphorous:   Lab Results   Component Value Date    PHOS 3 6 08/17/2018     Imaging Studies: I have personally reviewed pertinent reports  RIGHT UPPER QUADRANT ULTRASOUND     INDICATION:     abnormal LFT's      COMPARISON:  CT scan 8/13/2018      TECHNIQUE:   Real-time ultrasound of the right upper quadrant was performed with a curvilinear transducer with both volumetric sweeps and still imaging techniques      FINDINGS:     PANCREAS:  Visualized portions of the pancreas are within normal limits      AORTA AND IVC:  Visualized portions are normal for patient age      LIVER:  Size:  Within normal range  The liver measures 14 cm in the midclavicular line  Contour:  Surface contour is smooth  Parenchyma:  Echogenicity and echotexture are within normal limits  No evidence of suspicious mass  Limited imaging of the main portal vein shows it to be patent and hepatopetal      BILIARY:  The gallbladder is partially contracted  There is no significant wall thickening or pericholecystic fluid  No stones or sludge identified  No sonographic Garcia sign  No intrahepatic biliary dilatation  CBD measures 3 mm  No choledocholithiasis      KIDNEY:   Right kidney measures 10 2 cm  Within normal limits      ASCITES:   None      IMPRESSION:     Unremarkable right upper quadrant exam          Patient was seen and examined by Dr Jono Schroeder  All key medical decisions were made by Dr Jono Schroeder  Thank you for allowing us to participate in the care of this present patient  We will follow-up with you closely

## 2018-08-17 NOTE — NUTRITION
08/17/18 1336   PES Statement   Problem Continue previous diagnosis   Patient Nutrition Goals   Goal Status not met;extended   Timeframe to complete goal by next f/u   Recommendations/Interventions   Summary Per ST, pt is not appropriate for po intake  Pt vomits when ST initiates eval   On two occasions, pt had significant residual volumes ( on 08/16 at 09h45 = 150ml, 08/16 at 17h25 60 ml  Today, no residual volume noted)  Per CNA, pt with frequent large loose BM's due, in part, to lactulose  Per nursing, pt is d/t return to SNF at 17h00 today     Malnutrition/BMI Present No   Interventions EN change to continuous;EN change formula/rate   Nutrition Recommendations Tube Feeding Recommendation provided;Coordination of care  (D/t significant residual volumes, recommend a continuous TF using Jevity 1 2, 45ml /hr over 24 hours with water flushes of 100ml Q 6 hours to provide 1296kcal, 60gm protein, and total 1272ml free water)   Nutrition Complexity Risk   Nutrition complexity level High risk   Follow up date 08/20/18

## 2018-08-17 NOTE — PLAN OF CARE
Problem: DISCHARGE PLANNING - CARE MANAGEMENT  Goal: Discharge to post-acute care or home with appropriate resources  INTERVENTIONS:  - Conduct assessment to determine patient/family and health care team treatment goals, and need for post-acute services based on payer coverage, community resources, and patient preferences, and barriers to discharge  - Address psychosocial, clinical, and financial barriers to discharge as identified in assessment in conjunction with the patient/family and health care team  - Arrange appropriate level of post-acute services according to patient's   needs and preference and payer coverage in collaboration with the physician and health care team  - Communicate with and update the patient/family, physician, and health care team regarding progress on the discharge plan  - Arrange appropriate transportation to post-acute venues   Pt will be transported back to [facility] via [facility] today    Outcome: Completed Date Met: 08/17/18

## 2018-08-17 NOTE — RESPIRATORY THERAPY NOTE
NOTED:    Progress Note Ashleigh Perez 1980, 41 y o  female MRN: [id number]     Unit/Bed#: 406-01 Encounter: [id number]     Primary Care Provider: Diane Zavala DO   Date and time admitted to hospital: 8/13/2018  4:00 AM               * Acute encephalopathy   Assessment & Plan      Patient with altered mental status compared to baseline per her nursing facility, There was a question of possible underlying pneumonia, procalcitonin level is low, will discontinue antibiotics and continue monitoring, follow-up cultures        Possible underlying viral syndrome, patient's acute encephalopathy may be due to the fact that she had nausea and vomiting prior to admission which interrupted her seizure medication      Continue supportive care, due to persistently elevated CO2 level, will trial Vapotherm           Acute respiratory failure with hypercapnia (HCC)   Assessment & Plan     Elevate head of bed, continue supportive care, will consider BiPAP if patient has worsening hypercapnia, respiratory status should improve with supportive care including restarting tube feeds, continuous monitoring and assurance that patient is taking prescribed antiseizure drugs      Will initiate available from as above, will correct hypercapnia and reassess mental status  Code Status: Level 1 - Full Code    Pulmonary/Chest: Effort normal and breath sounds normal  No respiratory distress  She has no wheezes  PLAN:    RT PROTOCOL  Vapotherm to aid with decreasing CO retention until MD D/C

## 2018-08-18 PROBLEM — R74.01 TRANSAMINITIS: Status: ACTIVE | Noted: 2018-08-18

## 2018-08-18 PROBLEM — K76.82 ACUTE HEPATIC ENCEPHALOPATHY: Status: ACTIVE | Noted: 2018-08-18

## 2018-08-18 PROBLEM — R40.0 SOMNOLENCE: Status: ACTIVE | Noted: 2018-08-18

## 2018-08-18 PROBLEM — K72.00 ACUTE HEPATIC ENCEPHALOPATHY: Status: ACTIVE | Noted: 2018-08-18

## 2018-08-18 LAB
ALBUMIN SERPL BCP-MCNC: 2.4 G/DL (ref 3.5–5)
ALP SERPL-CCNC: 132 U/L (ref 46–116)
ALT SERPL W P-5'-P-CCNC: 186 U/L (ref 12–78)
ANION GAP SERPL CALCULATED.3IONS-SCNC: 10 MMOL/L (ref 4–13)
AST SERPL W P-5'-P-CCNC: 127 U/L (ref 5–45)
BACTERIA BLD CULT: NORMAL
BACTERIA BLD CULT: NORMAL
BASOPHILS # BLD AUTO: 0.07 THOUSANDS/ΜL (ref 0–0.1)
BASOPHILS NFR BLD AUTO: 1 % (ref 0–1)
BILIRUB DIRECT SERPL-MCNC: 0.2 MG/DL (ref 0–0.2)
BILIRUB SERPL-MCNC: 0.34 MG/DL (ref 0.2–1)
BUN SERPL-MCNC: 14 MG/DL (ref 5–25)
CALCIUM SERPL-MCNC: 8.5 MG/DL (ref 8.3–10.1)
CHLORIDE SERPL-SCNC: 107 MMOL/L (ref 100–108)
CO2 SERPL-SCNC: 25 MMOL/L (ref 21–32)
CREAT SERPL-MCNC: 0.46 MG/DL (ref 0.6–1.3)
EOSINOPHIL # BLD AUTO: 0.47 THOUSAND/ΜL (ref 0–0.61)
EOSINOPHIL NFR BLD AUTO: 5 % (ref 0–6)
ERYTHROCYTE [DISTWIDTH] IN BLOOD BY AUTOMATED COUNT: 14.9 % (ref 11.6–15.1)
FERRITIN SERPL-MCNC: 95 NG/ML (ref 8–388)
GFR SERPL CREATININE-BSD FRML MDRD: 127 ML/MIN/1.73SQ M
GLUCOSE SERPL-MCNC: 130 MG/DL (ref 65–140)
HCT VFR BLD AUTO: 39 % (ref 34.8–46.1)
HGB BLD-MCNC: 12.8 G/DL (ref 11.5–15.4)
IMM GRANULOCYTES # BLD AUTO: 0.04 THOUSAND/UL (ref 0–0.2)
IMM GRANULOCYTES NFR BLD AUTO: 0 % (ref 0–2)
IRON SATN MFR SERPL: 33 %
IRON SERPL-MCNC: 72 UG/DL (ref 50–170)
LYMPHOCYTES # BLD AUTO: 3.35 THOUSANDS/ΜL (ref 0.6–4.47)
LYMPHOCYTES NFR BLD AUTO: 34 % (ref 14–44)
MAGNESIUM SERPL-MCNC: 2.3 MG/DL (ref 1.6–2.6)
MCH RBC QN AUTO: 33 PG (ref 26.8–34.3)
MCHC RBC AUTO-ENTMCNC: 32.8 G/DL (ref 31.4–37.4)
MCV RBC AUTO: 101 FL (ref 82–98)
MONOCYTES # BLD AUTO: 1.06 THOUSAND/ΜL (ref 0.17–1.22)
MONOCYTES NFR BLD AUTO: 11 % (ref 4–12)
NEUTROPHILS # BLD AUTO: 4.8 THOUSANDS/ΜL (ref 1.85–7.62)
NEUTS SEG NFR BLD AUTO: 49 % (ref 43–75)
NRBC BLD AUTO-RTO: 0 /100 WBCS
PLATELET # BLD AUTO: 255 THOUSANDS/UL (ref 149–390)
PLATELET # BLD AUTO: 260 THOUSANDS/UL (ref 149–390)
PMV BLD AUTO: 11.2 FL (ref 8.9–12.7)
PMV BLD AUTO: 11.3 FL (ref 8.9–12.7)
POTASSIUM SERPL-SCNC: 3.8 MMOL/L (ref 3.5–5.3)
PROT SERPL-MCNC: 6.4 G/DL (ref 6.4–8.2)
RBC # BLD AUTO: 3.88 MILLION/UL (ref 3.81–5.12)
SODIUM SERPL-SCNC: 142 MMOL/L (ref 136–145)
TIBC SERPL-MCNC: 221 UG/DL (ref 250–450)
WBC # BLD AUTO: 9.79 THOUSAND/UL (ref 4.31–10.16)

## 2018-08-18 PROCEDURE — 99223 1ST HOSP IP/OBS HIGH 75: CPT | Performed by: INTERNAL MEDICINE

## 2018-08-18 PROCEDURE — 83735 ASSAY OF MAGNESIUM: CPT | Performed by: INTERNAL MEDICINE

## 2018-08-18 PROCEDURE — 99232 SBSQ HOSP IP/OBS MODERATE 35: CPT | Performed by: PHYSICIAN ASSISTANT

## 2018-08-18 PROCEDURE — 83540 ASSAY OF IRON: CPT | Performed by: PHYSICIAN ASSISTANT

## 2018-08-18 PROCEDURE — 80048 BASIC METABOLIC PNL TOTAL CA: CPT | Performed by: INTERNAL MEDICINE

## 2018-08-18 PROCEDURE — 82728 ASSAY OF FERRITIN: CPT | Performed by: PHYSICIAN ASSISTANT

## 2018-08-18 PROCEDURE — 85049 AUTOMATED PLATELET COUNT: CPT | Performed by: INTERNAL MEDICINE

## 2018-08-18 PROCEDURE — 80076 HEPATIC FUNCTION PANEL: CPT | Performed by: PHYSICIAN ASSISTANT

## 2018-08-18 PROCEDURE — 83919 ORGANIC ACIDS QUAL EACH: CPT | Performed by: PSYCHIATRY & NEUROLOGY

## 2018-08-18 PROCEDURE — 85025 COMPLETE CBC W/AUTO DIFF WBC: CPT | Performed by: INTERNAL MEDICINE

## 2018-08-18 PROCEDURE — 83550 IRON BINDING TEST: CPT | Performed by: PHYSICIAN ASSISTANT

## 2018-08-18 PROCEDURE — 99223 1ST HOSP IP/OBS HIGH 75: CPT | Performed by: PSYCHIATRY & NEUROLOGY

## 2018-08-18 RX ORDER — SODIUM CHLORIDE 9 MG/ML
75 INJECTION, SOLUTION INTRAVENOUS CONTINUOUS
Status: DISCONTINUED | OUTPATIENT
Start: 2018-08-18 | End: 2018-08-18

## 2018-08-18 RX ORDER — POTASSIUM CHLORIDE 20MEQ/15ML
20 LIQUID (ML) ORAL DAILY
Status: DISCONTINUED | OUTPATIENT
Start: 2018-08-18 | End: 2018-08-24 | Stop reason: HOSPADM

## 2018-08-18 RX ORDER — ONDANSETRON 2 MG/ML
4 INJECTION INTRAMUSCULAR; INTRAVENOUS EVERY 6 HOURS PRN
Status: DISCONTINUED | OUTPATIENT
Start: 2018-08-18 | End: 2018-08-24 | Stop reason: HOSPADM

## 2018-08-18 RX ORDER — BISACODYL 10 MG
10 SUPPOSITORY, RECTAL RECTAL DAILY PRN
Status: DISCONTINUED | OUTPATIENT
Start: 2018-08-18 | End: 2018-08-24 | Stop reason: HOSPADM

## 2018-08-18 RX ORDER — SACCHAROMYCES BOULARDII 250 MG
250 CAPSULE ORAL 2 TIMES DAILY
Status: DISCONTINUED | OUTPATIENT
Start: 2018-08-18 | End: 2018-08-24 | Stop reason: HOSPADM

## 2018-08-18 RX ORDER — SODIUM PHOSPHATE, DIBASIC AND SODIUM PHOSPHATE, MONOBASIC 7; 19 G/133ML; G/133ML
1 ENEMA RECTAL DAILY PRN
Status: DISCONTINUED | OUTPATIENT
Start: 2018-08-18 | End: 2018-08-24 | Stop reason: HOSPADM

## 2018-08-18 RX ORDER — MINERAL OIL AND PETROLATUM 150; 830 MG/G; MG/G
1 OINTMENT OPHTHALMIC 3 TIMES DAILY
Status: DISCONTINUED | OUTPATIENT
Start: 2018-08-18 | End: 2018-08-24 | Stop reason: HOSPADM

## 2018-08-18 RX ORDER — LEVOCARNITINE 1 G/10ML
500 SOLUTION ORAL 3 TIMES DAILY
Status: DISCONTINUED | OUTPATIENT
Start: 2018-08-18 | End: 2018-08-19

## 2018-08-18 RX ORDER — PHENOBARBITAL 20 MG/5ML
20 ELIXIR ORAL
Status: DISCONTINUED | OUTPATIENT
Start: 2018-08-18 | End: 2018-08-24 | Stop reason: HOSPADM

## 2018-08-18 RX ORDER — ONDANSETRON 4 MG/1
4 TABLET, ORALLY DISINTEGRATING ORAL EVERY 6 HOURS PRN
Status: DISCONTINUED | OUTPATIENT
Start: 2018-08-18 | End: 2018-08-24 | Stop reason: HOSPADM

## 2018-08-18 RX ORDER — VALPROIC ACID 250 MG/5ML
500 SOLUTION ORAL EVERY 8 HOURS
Status: DISCONTINUED | OUTPATIENT
Start: 2018-08-18 | End: 2018-08-20

## 2018-08-18 RX ORDER — LACTULOSE 20 G/30ML
30 SOLUTION ORAL 3 TIMES DAILY
Status: DISCONTINUED | OUTPATIENT
Start: 2018-08-18 | End: 2018-08-24 | Stop reason: HOSPADM

## 2018-08-18 RX ORDER — AMOXICILLIN 250 MG
1 CAPSULE ORAL DAILY
Status: DISCONTINUED | OUTPATIENT
Start: 2018-08-18 | End: 2018-08-24 | Stop reason: HOSPADM

## 2018-08-18 RX ORDER — LEVETIRACETAM 100 MG/ML
1000 SOLUTION ORAL EVERY 12 HOURS SCHEDULED
Status: DISCONTINUED | OUTPATIENT
Start: 2018-08-18 | End: 2018-08-24 | Stop reason: HOSPADM

## 2018-08-18 RX ORDER — TOPIRAMATE 100 MG/1
200 TABLET, FILM COATED ORAL EVERY 12 HOURS SCHEDULED
Status: DISCONTINUED | OUTPATIENT
Start: 2018-08-18 | End: 2018-08-24 | Stop reason: HOSPADM

## 2018-08-18 RX ORDER — ACETAMINOPHEN 325 MG/1
650 TABLET ORAL EVERY 6 HOURS PRN
Status: DISCONTINUED | OUTPATIENT
Start: 2018-08-18 | End: 2018-08-24 | Stop reason: HOSPADM

## 2018-08-18 RX ORDER — RUFINAMIDE 200 MG/1
1200 TABLET, FILM COATED ORAL 2 TIMES DAILY
Status: DISCONTINUED | OUTPATIENT
Start: 2018-08-18 | End: 2018-08-24 | Stop reason: HOSPADM

## 2018-08-18 RX ADMIN — LEVOCARNITINE 500 MG: 1 SOLUTION ORAL at 16:04

## 2018-08-18 RX ADMIN — LEVOCARNITINE 500 MG: 1 SOLUTION ORAL at 03:12

## 2018-08-18 RX ADMIN — Medication 250 MG: at 08:21

## 2018-08-18 RX ADMIN — TOPIRAMATE 200 MG: 100 TABLET, FILM COATED ORAL at 21:59

## 2018-08-18 RX ADMIN — LEVETIRACETAM 1000 MG: 100 SOLUTION ORAL at 03:11

## 2018-08-18 RX ADMIN — POTASSIUM CHLORIDE 20 MEQ: 20 SOLUTION ORAL at 08:21

## 2018-08-18 RX ADMIN — VALPROIC ACID 500 MG: 250 SOLUTION ORAL at 16:08

## 2018-08-18 RX ADMIN — LEVOCARNITINE 500 MG: 1 SOLUTION ORAL at 08:22

## 2018-08-18 RX ADMIN — MINERAL OIL AND WHITE PETROLATUM 1 APPLICATION: 30; 940 OINTMENT OPHTHALMIC at 16:08

## 2018-08-18 RX ADMIN — PHENOBARBITAL 20 MG: 20 LIQUID ORAL at 21:59

## 2018-08-18 RX ADMIN — RUFINAMIDE 1200 MG: 200 TABLET, FILM COATED ORAL at 17:02

## 2018-08-18 RX ADMIN — LACTULOSE 30 G: 20 SOLUTION ORAL at 16:04

## 2018-08-18 RX ADMIN — MINERAL OIL AND WHITE PETROLATUM 1 APPLICATION: 30; 940 OINTMENT OPHTHALMIC at 08:22

## 2018-08-18 RX ADMIN — SODIUM CHLORIDE 75 ML/HR: 0.9 INJECTION, SOLUTION INTRAVENOUS at 01:55

## 2018-08-18 RX ADMIN — LEVETIRACETAM 1000 MG: 100 SOLUTION ORAL at 08:22

## 2018-08-18 RX ADMIN — LACTULOSE 30 G: 20 SOLUTION ORAL at 08:21

## 2018-08-18 RX ADMIN — VALPROIC ACID 500 MG: 250 SOLUTION ORAL at 03:10

## 2018-08-18 RX ADMIN — ENOXAPARIN SODIUM 40 MG: 40 INJECTION SUBCUTANEOUS at 08:21

## 2018-08-18 RX ADMIN — VALPROIC ACID 500 MG: 250 SOLUTION ORAL at 08:21

## 2018-08-18 RX ADMIN — MINERAL OIL AND WHITE PETROLATUM 1 APPLICATION: 30; 940 OINTMENT OPHTHALMIC at 21:59

## 2018-08-18 RX ADMIN — LACTULOSE 30 G: 20 SOLUTION ORAL at 03:11

## 2018-08-18 RX ADMIN — Medication 250 MG: at 17:02

## 2018-08-18 RX ADMIN — LEVETIRACETAM 1000 MG: 100 SOLUTION ORAL at 21:59

## 2018-08-18 RX ADMIN — PERAMPANEL 8 MG: 4 TABLET ORAL at 21:59

## 2018-08-18 RX ADMIN — LACTULOSE 30 G: 20 SOLUTION ORAL at 21:59

## 2018-08-18 RX ADMIN — SENNOSIDES AND DOCUSATE SODIUM 1 TABLET: 8.6; 5 TABLET ORAL at 08:23

## 2018-08-18 RX ADMIN — LEVOCARNITINE 500 MG: 1 SOLUTION ORAL at 21:59

## 2018-08-18 RX ADMIN — TOPIRAMATE 200 MG: 100 TABLET, FILM COATED ORAL at 13:52

## 2018-08-18 NOTE — CONSULTS
Neurology/Epilepsy Consultation Note  Patient Name:  Jarek Moore  : 1980  MRN: [id number]  Encounter: [id number]  Room: [place] [address] [address]-01  Date: 18    Referring Provider:  Elaine Russo MD    History Provider/Accompanied by: Christine Houser called [address] [phone] [facility])    Chief Complaint: intractable epilepsy, depressed mental status    HPI:      Ms Jarek Moore is a 41 y o  woman presenting to hospital with depressed mental status, difficult to awake, in the setting of intractable epilepsy, Lennox Gastaut Syndrome  I spoke with Erasmo Parker, the unit nurse, at [facility]  After Ms Bull's last office visit in 2018, she was started on phenobarbital and taken off of Onfi  About a month ago, she was awake most days, and there are days when she was sleepy  Phenobarbital did not significantly affect degree of wakefulness  Before phenobarbital, there were more frequent convulsive seizures and eyes rolling to the back of the head  Since addition of phenobarbital there were less seizure activity, one seizure a week (tonic body rigidity with myoclonic jerking of the limbs)  She has a shaking activity eyes rolling back of the head for a few seconds  There has been no generalized convulsions with arms and leg shaking for the past 6 months  Night shift nursing staff would noticed a low grade fevers  There have been no new coughs, rashes, or wounds  She is incontinent of urine  Lung sounds were clear  There is reports of change to bowel movements, constipation, and urinary retention  There was no issue with swallowing, she does have pleasure feeds  She typically will walk with assistance to transfer  The day before hospitalization, she was more lethargic hard to arouse, not wake up, she was very floppy  Typically, when she is getting bathed she is active in assisting, such as turning over  When they try washing her, she was floppy and low tone        I called the patient's parents' cell phone and left a message for a call back  AED/side effects/compliance:  Last office visit recommendation 4/23/2018:  Valproic acid 250mg/5mL give 10mL three times a day   Banzel 400mg give 3 tabs twice a day   Topiramate 50mg and 200mg tab, one each twice a day   Levetiracetam 100mg/mL give 10mL twice a day   Fycompa 10mg at bedtime (oral solution)  Phenobarbital 20mg/5mL give 15mL daily    While in [facility] was not given because it is not formulary  Phenobarbital was reduced to 15mg daily  Valproic acid was reduced to 250mg three times a day     Event/Seizure semiology:  Seizure semiology:  1  She was described to have drop attacks or spells with grunting sounds and falling to the floor  2  Her nurse commented on episodes of spasms or myoclonic jerking of the right arm  3  Generalized convulsions  4  Tonic seizures of eyes rolling back (mostly during sleep)    On continuous EEG monitoring,Dr dePadua reported 4 ictal patterns:  1 - 1-3 seconds of diffuse electrodecrement then 2-7 seconds of fast activity then 2Hz spike wave discharges (no clinical manifestation)  2 - same as #1, clinically accompanied by posturing of the arms, then clonic jerking  3 - diffuse low fast activity without progressing to spike-wave discharges  4 - 2 Hz generalized discharges for 2-3 minutes with no clonical manifestations    Prior Epilepsy History:  I have no information as to when her epilepsy/seizure disorder started  She was previously evaluated by Dr Keren Oviedo, whose notes refer to an episode of status epilepticus in 02/2013, she needed general anesthesia to induce coma, subsequently a PEG tube was placed  She has a vagus nerve stimulator placed in the early 2000s  Over the last 4 years, we have been adjusting her AEDs to balance out seizure control and degree of somnolence  In 2011, she was on clonazepam, levetiracetam, valproic acid, and lamotrigine    Banzel was subsequently added in   Sonido Mail was then added in   When she went into status epilepticus in  she was on levetiracetam, Banzel, Onfi, and lamotrigine  Due to excessive level of valproic acid along with ammonia levels it was slowly reduce starting , however with dose reduction there was an increased number of tonic seizures  We had weaned her off of levetiracetam due to the uncertainty of seizure control  Topiramate was subsequently added  When topiramate was held she had a number of convulsive seizures  Increasing Onfi cause excessive sedation  In 2017 due to increased frequency of seizures she was put on continuous video EEG monitoring, increased seizure was likely due to an underlying infection at the time  We had discontinue lamotrigine out of concern that it may have been worsening her seizures I reinstituted levetiracetam   We then started Fycompa at the same time  She was admitted to hospital again in 2017 for pneumonia continued to have frequent tonic seizures  Higher doses of valproic acid was introduced along with decrease in Sonido Mail  Unclear if levetiracetam had been helpful  Unclear if Fycompa had been helpful  Unclear if Banzel had been helpful  Special Features  Status epilepticus: yes  Self Injury Seizures: No  Precipitating Factors: menstrual cycle? ?     Epilepsy Risk Factors:  Abnormal pregnancy: unknown  Abnormal birth/: unknown  Abnormal Development: unknown developmental history  Febrile seizures, simple: unknown  Febrile seizures, complex: unknown  CNS infection: No  Mental retardation: Yes  Cerebral palsy: Yes  Head injury (moderate/severe): possibly anoxic brain injury  CNS neoplasm: No  CNS malformation: No  Neurosurgical procedure: No  Stroke: No  Alcohol abuse: No  Drug abuse: No  Family history Sz/epilepsy: unknown     Prior AEDs:  Rufinamide  Clobazam (excessive sedation)  Clonazepam  Levetiracetam (unclear if beneficial)  Lamotrigine (unclear if beneficial)  Topiramate (missed doses caused breakthrough seizures)  Valproate (as medication was weaned off, there were increased seizures)  Fycompa  Felbamate (listed as a drug allergy)    Past Medical/Surgical History:  Past Medical History:   Diagnosis Date    ADHD     Anoxic brain damage (HCC)     Autistic disorder     Hyperammonemia (HCC)     Hyperkeratosis     Hypotension     Intellectual disability     Intellectual disability     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with tonic seizures (HCC)     Lethargy     Liver enzyme elevation     Onychomycosis     Osteoporosis     Osteoporosis     Psychiatric disorder     anxiety     Past Surgical History:   Procedure Laterality Date    ABDOMINAL SURGERY      CARDIAC PACEMAKER PLACEMENT      JEJUNOSTOMY FEEDING TUBE      PEG TUBE PLACEMENT       Medications:    Current Facility-Administered Medications:     acetaminophen (TYLENOL) tablet 650 mg, 650 mg, Per G Tube, Q6H PRN, Dena Herron MD    artificial tear (LUBRIFRESH P M ) ophthalmic ointment 1 application, 1 application, Ophthalmic, TID, Dena Herron MD, 1 application at 41/44/89 6395    bisacodyl (DULCOLAX) rectal suppository 10 mg, 10 mg, Rectal, Daily PRN, Dena Herron MD    enoxaparin (LOVENOX) subcutaneous injection 40 mg, 40 mg, Subcutaneous, Daily, Dena Herron MD, 40 mg at 08/18/18 9450    lactulose 20 g/30 mL oral solution 30 g, 30 g, Per G Tube, TID, Dena Herron MD, 30 g at 08/18/18 0821    levETIRAcetam (KEPPRA) oral solution 1,000 mg, 1,000 mg, Per G Tube, Q12H [address], Dena Herron MD, 1,000 mg at 08/18/18 0721    levOCARNitine (CARNITOR) oral solution 500 mg, 500 mg, Per G Tube, TID, Dena Herron MD, 500 mg at 08/18/18 6261    magnesium hydroxide (MILK OF MAGNESIA) 400 mg/5 mL oral suspension 30 mL, 30 mL, Oral, Daily PRN, Dena Herron MD    NON FORMULARY, 10 mg, Per G Tube, HS, Thanh MD Jessi    ondansetron (ZOFRAN) injection 4 mg, 4 mg, Intravenous, Q6H PRN, Jonh Sandy MD    ondansetron (ZOFRAN-ODT) dispersible tablet 4 mg, 4 mg, Oral, Q6H PRN, Jonh Sandy MD    PHENobarbital oral elixir 20 mg, 20 mg, Oral, HS, Rubina Ruby MD    potassium chloride 10 % oral solution 20 mEq, 20 mEq, Per G Tube, Daily, Jonh Sandy MD, 20 mEq at 08/18/18 0593    rufinamide (BANZEL) tablet 1,200 mg, 1,200 mg, Per G Tube, BID, Jonh Sandy MD    saccharomyces boulardii (FLORASTOR) capsule 250 mg, 250 mg, Per NG Tube, BID, Jonh Sandy MD, 250 mg at 08/18/18 2148    senna-docusate sodium (SENOKOT S) 8 6-50 mg per tablet 1 tablet, 1 tablet, Per G Tube, Daily, Jonh Sandy MD, 1 tablet at 08/18/18 1523    sodium chloride 0 9 % infusion, 75 mL/hr, Intravenous, Continuous, Jonh Sandy MD, Last Rate: 75 mL/hr at 08/18/18 0155, 75 mL/hr at 08/18/18 0155    sodium phosphate-biphosphate (FLEET) enema 1 enema, 1 enema, Rectal, Daily PRN, Jonh Sandy MD    topiramate (TOPAMAX) tablet 200 mg, 200 mg, Oral, Q12H [address], Rubina Ruby MD    valproic acid (DEPAKENE) 250 MG/5ML oral soln 500 mg, 500 mg, Per G Tube, Q8H, Jonh Sandy MD, 500 mg at 08/18/18 8956    Allergies: Allergies   Allergen Reactions    Felbamate      Family history:  Unable to obtain    Social History  Living situation:  [facility], [phone]  Tobacco:  No tobacco use   Alcohol:  No alcohol use      Drugs:  No illegal drug use    Review of Systems  A review of 12 organ systems was completed and all systems were negative except for what is detailed in the HPI and noted below  Review of systems not obtained due to patient factors patient is unable to answer questions; questions to posed to unit nurse at [place] [address]: nighttime temperature 100F  Eyes: negative  Ears, nose, mouth, throat, and face: negative  Respiratory: negative, no change  Cardiovascular: negative  Gastrointestinal: patient is on lactulose, so stool is liquid consistency and frequent  Neurological: see HPI, pervasive intellectual disability  Behavioral/Psych: intermittent aggitation, abnormal sleep pattern    Physical Exam  Vitals: Blood pressure 93/61, pulse 95, temperature 98 °F (36 7 °C), temperature source Axillary, resp  rate 18, height 4' 9" (1 448 m), SpO2 97 %  General exam   Appearance: awake, trying to sit up from bed, while the nurse is administering her tube feeds, active in trying to reach for objects  Cardiovascular: regular rate and rhythm and normal heart sounds  Pulmonary: normal inspiratory effort, normal percussion sounds and clear to auscultation  Abdominal: soft, nontender, nondistended, there is G-tube in her abdomen  Extremities: normal color, temperature, peripheral pulses intact  HENT: atraumatic, lips are dry, no lymphadenopathy, anicteric, neck is supple and moist mucus membranes / oral cavity   Neuro: awake, no verbal response, does not follow commands  Cranial Nerves: pupils are symmetric, reactive to light, EOMI, no nystagmus, face appears to be symmetric  Motor/Musculoskeletal: no lateralizing weakness, normal proximal tone but hypotonic at the wrists and ankles, thin muscle build  Reflexes: left toe up going, right toe mute, hyporeflexic throughout unable to elicit ankle and patellar reflexes    STUDIES REVIEWED:  I personally reviewed the following studies    Imagin/15/2018 - CT head  No acute intracranial abnormality    2018 - CT chest/abdomen  No acute pathology    EEGs:  Continuous video EEG monitoring 10/18 - 10/25/2017  Diffuse background slowing with bursts of arrhythmic generalized spike wave discharges, with shifting lateralization, and independent left and  right temporal spikes  Mild eyelid myoclonia associated with brief bursts of 2-2 5 Hz generalized discharges  Tonic seizures with eelctrodecrement  There were innumerable tonic seizures; however by 10/25/2017 the frequency of these seizures did decrease in frequency      Continuous video EEG monitoring 12/1-12/5/2017  There are innumerable tonic seizures that are generally less than one minute in duration (30-40 seconds), these consists of subtle eye opening and tonic stiffening of arms, if longer then whole body stiffening with clonic jerking movements  These almost always occur exclusively out of sleep  These consist of 2-2 5 Hz generalized spike-slow wave complexes with generalized attenuation of activity (desynchronization) or paroxysmal fast activity  Per 24 hours count of seizures could be       Labs:  5/25-5/29/2018  Valproic acid free 10 7  Valproic acid total 35  Levetiracetam 38 2  Topiramate 6 1  Phenobarbital 14    6/15/2018  Valproic acid 79    Last set of LFTs:  5/29/2018 (reported by nurse from [facility])  Albumin 3 0  Bilirubin 0 1  Total protien 6 2  AST 34  ALT 31  Alkaline phosphatase 81    8/12/2018  CBC 12 0 9/13 5/39 6/142  /4 2/98/32/14/0 4/84  Ammonia 66  Patient was receiving lactulose 30 g 3 times a day    Component      Latest Ref Rng & Units 8/13/2018 8/16/2018   VALPROIC ACID TOTAL      50 - 100 ug/mL 50 1 56   PHENOBARBITAL LEVEL      15 0 - 40 0 ug/mL 21 0 16 0   TSH 3RD GENERATON      0 358 - 3 740 uIU/mL 5 310 (H)      Component       AST (SGOT) ALT ALK PHOS Total Protein   Latest Ref Rng & Units       5 - 45 U/L 12 - 78 U/L 46 - 116 U/L 6 4 - 8 2 g/dL   3/4/2018       44 58 128 (H) 7 7   8/13/2018       216 (H) 210 (H) 130 (H) 6 1 (L)   8/14/2018       295 (H) 246 (H) 119 (H) 5 4 (L)   8/15/2018       274 (H) 287 (H) 141 (H) 5 9 (L)   8/16/2018       176 (H) 251 (H) 146 (H) 6 1 (L)   8/17/2018       153 (H) 229 (H) 145 (H) 6 2 (L)   8/18/2018       127 (H) 186 (H) 132 (H) 6 4     Component       Albumin Total Bilirubin   Latest Ref Rng & Units       3 5 - 5 0 g/dL 0 20 - 1 00 mg/dL   8/18/2018       2 4 (L) 0 34     Component      Latest Ref Rng & Units 8/18/2018 Sodium      136 - 145 mmol/L 142   Potassium      3 5 - 5 3 mmol/L 3 8   Chloride      100 - 108 mmol/L 107   CO2      21 - 32 mmol/L 25   Anion Gap      4 - 13 mmol/L 10   BUN      5 - 25 mg/dL 14   Creatinine      0 60 - 1 30 mg/dL 0 46 (L)   Glucose      65 - 140 mg/dL 130   Calcium      8 3 - 10 1 mg/dL 8 5     Component Ammonia   Latest Ref Rng & Units 11 - 35 umol/L   12/2/2017 48 (H)   12/4/2017 120 (H)   12/6/2017 67 (H)   3/4/2018 29   8/13/2018 40 (H)   8/15/2018 46 (H)   8/16/2018 73 (H)   8/17/2018 71 (H)     Diagnostic/Impression:  Ms Vaibhav Early is a 41 y o  woman with intractable symptomatic generalized epilepsy, Lennox-Gastaut syndrome, and severe intellectual disability presenting to hospital with increased sedation, somnolence, and hypercapnia  Liver function tests also shows an increase of serum amino transferase elevations along with hyperammonemia  She chronically has hyperammonemia likely due to combination of valproic acid and topiramate  Again it is unfortunate that these two medications are associated with worsening seizures if they are withdrawn or withheld  Most recently she was given phenobarbital and replacement of a benzodiazepine  Prior to starting phenobarbital her hepatic function tests were normal, if phenobarbital was the only new medication in the last 3-4 months it is likely that it has caused some degree of hepatic toxicity  Which is unfortunate in that her seizure control has dramatically improved with phenobarbital   Hypersomnolence from medications is likely due to her polypharmacy however it is difficult to quickly discontinue multiple medications at the same time given that she is at extremely high risk for recurrent multiple seizures within a day  Ms Noah Aguirre management of her epilepsy syndrome is extremely complicated by being on six medications, medication interactions, and side effects        Today, her level of alertness is much greater than what I had expected from my prior conversation with the unit nurse and referring physician from [address] [facility]  She does have an abnormal sleep awake cycle given that she may go for a couple of days being hypersomnolent followed by days of hyperactivity  I will recommend could further evaluation of liver function tests looking for alternative possibility of intrinsic liver disease and I will also add on organic acid and amino acid abnormalities looking for inborn errors of metabolism that could contribute to hyperammonemia  There are rare cases of late onset inborn errors of metabolism that is associated with hyperammonemia that could be triggered by medications or metabolic stress  At least the AST/ALT are trending lower  We will continue her on a lower dose of phenobarbital given the risk of withdrawal seizures related to this medication  We may consider primidone as a replacement of phenobarbital, looking at the [address] [place] [address] site, it appears that predominant not associated with serum amino transferase elevations and liver injury from primidone is exceedingly rare  I will also try to reduce other AEDs to minimal doses  With future planning of weaning off either levetiracetam or Fycompa  She should continue with levocarnitine, which may be helpful in counter acting valproic acid induce hyperammonemia      Plan:   Epilepsy:  - Continue valproic acid 500 mg 3 times a day  - Decrease topiramate to 200 mg every 12 hours  - Decrease phenobarbital to 20 mg at bedtime  - Decreased perampanel to 8 mg at bedtime  - Continue rufinamide 1200 mg twice a day  - continue levetiracetam 1000mg twice a day  - will repeat drug levels on Monday (topiramate, valproic acid total and free, levetiracetam, phenobarbital)  - will hold off on video EEG monitoring due to the patient being more alert    Hyperammonianemia / abnormal LFTs:  - check urine organic acid, plasma amino acid, plasma acylcarnitine levels, carnitine level  - continue to follow LFTs and ammonia  - continue Levocarnitine 500mg three times a day (will consider increasing to 1000mg three times a day)  - continue with Lactulose    Somnolence / altered mental status:  - monitor for fevers, infection  - likely from polypharmacy, hepatic hyperammonianemia encephalopathy superimposed on severe intellectual disability    Decision making was of high-complexity due to the patient's high risk condition (seizures), psychiatric and neuropsychological comorbidities, behavioral problems, memory and cognitive problems and medication side effects

## 2018-08-18 NOTE — PROGRESS NOTES
FERNANDO admitting MD made aware pt is on P7  Pt resting in bed comfortably  Will continue to monitor

## 2018-08-18 NOTE — PROGRESS NOTES
Progress Note - Epifanio Kate 1980, 41 y o  female MRN: [id number]  [facility] Encounter: [id number] DOS: 8/18/18  Primary Care Provider: Roland Bosworth, DO   Date and time admitted to hospital: 8/17/2018  9:28 PM    * Acute encephalopathy   Assessment & Plan    · Patient presented with AMS and somnolence from nursing facility  She was admitted to [facility] 8/13 to 8/17  There was question of underlying pneumonia although PCT level was low and antibiotics were d/c  She was noted to have hyperammonemia although this is chronic  Found to have elevated LFTs and Depakote, phenobarbital doses were decreased  Fycompa was not on formulary and suspect patient was having subtle breakthrough seizures likely contributing to her somnolence  · Patient antiepileptics resumed at normal doses and phenobarb was decreased from 60 mg to 20 mg per Neurology  Pt appears more alert today  · Mental status at baseline waxes and wanes with intermittent periods of hyper and hypoactivity  · Continue to monitor mental status   · Continue one-to-one continuous observation        Transaminitis   Assessment & Plan    · Unknown etiology, patient started on phenobarbital in April 2018  LFTs back in March were normal   According to epileptologist, liver enzymes were also normal in May  There was no further lab work to assess LFTs apart from lab work on admission  · Possibly 2/2 AEDs, particularly phenobarbital   Discussed with epileptologist   Monitoring of phenobarbital levels in the outpatient setting has been relatively normal     · Gastroenterology following, appreciate input, noted to have elevated INR that is now stabilized  Suspect hepatocellular pattern and appear to be stable   Will trend AST/ALT, INR  · Right upper quadrant ultrasound was unremarkable  · Full liver workup recommended by GI including viral hepatitis panel, serology for autoimmune hepatitis, PBC, Ethan's disease, hemochromatosis and alpha 1 antitrypsin deficiency  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with tonic seizures (HCC)   Assessment & Plan    · Discussed in detail with Dr Gladys Preez  While at [address] her Ritu Day was not on formulary, her phenobarb was reduced to 15 mg daily and her valproic acid was reduced to 250 mg TID  per discussion between hospitalist and patient's primary epileptologist  Patient has history of breakthrough seizures with any minor change in her medications  Seizures are very subtle and she has history of 4 ictal patterns  · Continue home meds med current doses which include non formulary rufinamide 1200mg BID, perampanel 8mg at bedtime, topamax 250mg BID, Keppra 1000mg q12, valproic acid 500mg q8, and phenobarb 20mg at bedtime   · Low threshold to initiate EMU at this time   · Follow-up AED levels, phenobarb level sent on 08/16/2018 normal 16  · Seizure precautions, prn ativan         Hyperammonemia (HCC)   Assessment & Plan    · Pneumonia tends to fluctuate in this patient, Likely secondary to valproic acid  · Can continue lactulose, levocarnitine, Xifaxan although unclear if there is any benefit of continuing Xifaxin   · appreciate GI input         Intellectual disability   Assessment & Plan    · Supportive care        Dysphagia   Assessment & Plan    · Patient had 1 episode of vomiting 8/16   · Tolerating tube feeds          VTE Pharmacologic Prophylaxis:   Pharmacologic: Enoxaparin (Lovenox)  Mechanical VTE Prophylaxis in Place: Yes    Patient Centered Rounds: I have performed bedside rounds with nursing staff today  Discussions with Specialists or Other Care Team Provider: nursing, Dr Gladys Perez     Education and Discussions with Family / Patient: patient    Time Spent for Care: 30 minutes  More than 50% of total time spent on counseling and coordination of care as described above      Current Length of Stay: 1 day(s)    Current Patient Status: Inpatient   Certification Statement: The patient will continue to require additional inpatient hospital stay due to close monitoring of seizure activity and mental status, monitoring of LFTs given transaminitis     Discharge Plan / Estimated Discharge Date: pending    Code Status: Level 1 - Full Code    Subjective:   Pt seen and examined at bedside  Does not participate in exam  Takes deep breaths and allows me to auscultate her heart sounds  Objective:     Vitals:   Temp (24hrs), Av 7 °F (36 5 °C), Min:97 3 °F (36 3 °C), Max:98 °F (36 7 °C)    HR:  [70-95] 95  Resp:  [18] 18  BP: (87-93)/(61-69) 93/61  SpO2:  [97 %] 97 %  Body mass index is 20 08 kg/m²  Input and Output Summary (last 24 hours): Intake/Output Summary (Last 24 hours) at 18 1521  Last data filed at 18 0800   Gross per 24 hour   Intake              100 ml   Output                0 ml   Net              100 ml     Physical Exam:     Physical Exam   Constitutional: She appears well-developed  Appears younger than stated age    HENT:   Head: Normocephalic and atraumatic  Eyes: EOM are normal  No scleral icterus  Cardiovascular: Normal rate, regular rhythm and normal heart sounds  No murmur heard  Pulmonary/Chest: Effort normal and breath sounds normal  She has no wheezes  She has no rales  Musculoskeletal: Normal range of motion  She exhibits no edema  Neurological:   Awake and alert    Skin: Skin is warm and dry     Psychiatric:   Nonverbal      Additional Data:     Labs:      Results from last 7 days  Lab Units 18  0507   WBC Thousand/uL 9 79   HEMOGLOBIN g/dL 12 8   HEMATOCRIT % 39 0   PLATELETS Thousands/uL 260  255   NEUTROS PCT % 49   LYMPHS PCT % 34   MONOS PCT % 11   EOS PCT % 5       Results from last 7 days  Lab Units 18  0507   SODIUM mmol/L 142   POTASSIUM mmol/L 3 8   CHLORIDE mmol/L 107   CO2 mmol/L 25   BUN mg/dL 14   CREATININE mg/dL 0 46*   CALCIUM mg/dL 8 5   TOTAL PROTEIN g/dL 6 4   BILIRUBIN TOTAL mg/dL 0 34   ALK PHOS U/L 132*   ALT U/L 186*   AST U/L 127*   GLUCOSE RANDOM mg/dL 130       Results from last 7 days  Lab Units 08/15/18  0535   INR  1 31*       * I Have Reviewed All Lab Data Listed Above  * Additional Pertinent Lab Tests Reviewed: Maddie 67 Admission Reviewed    Imaging:    Imaging Reports Reviewed Today Include: all  Imaging Personally Reviewed by Myself Includes:  none    Recent Cultures (last 7 days):       Results from last 7 days  Lab Units 08/13/18  1736 08/13/18  0424   BLOOD CULTURE   --  No Growth After 5 Days  No Growth After 5 Days     LEGIONELLA URINARY ANTIGEN  Negative  --        Last 24 Hours Medication List:     Current Facility-Administered Medications:  acetaminophen 650 mg Per G Tube Q6H PRN Josse Eva, MD    artificial tear 1 application Ophthalmic TID Josse Velasquez MD    bisacodyl 10 mg Rectal Daily PRN Josse Velasquez MD    enoxaparin 40 mg Subcutaneous Daily Josse Velasquez MD    lactulose 30 g Per G Tube TID Josse Velasquez MD    levETIRAcetam 1,000 mg Per G Tube Q12H [facility] Josse Velasquez MD    levOCARNitine 500 mg Per G Tube TID Josse Velasquez MD    magnesium hydroxide 30 mL Oral Daily PRN Josse Velasquez MD    ondansetron 4 mg Intravenous Q6H PRN Josse Velasquez MD    ondansetron 4 mg Oral Q6H PRN Josse Velasquez MD    Perampanel 8 mg Per G Tube HS Michaela Gorman MD    PHENobarbital 20 mg Oral HS Michaela Gorman MD    potassium chloride 20 mEq Per G Tube Daily Josse Velasquez MD    rufinamide 1,200 mg Per G Tube BID Josse Velasquez MD    saccharomyces boulardii 250 mg Per NG Tube BID Josse Velasquez MD    senna-docusate sodium 1 tablet Per G Tube Daily Josse Velasquez MD    sodium chloride 75 mL/hr Intravenous Continuous Josse Velasquez MD Last Rate: 75 mL/hr (08/18/18 0155)   sodium phosphate-biphosphate 1 enema Rectal Daily PRN Josse Velasquez MD    topiramate 200 mg Oral Q12H Taylor Baxter MD    valproic acid 500 mg Per Jonathan Montesinos MD Today, Patient Was Seen By: Marii Hollingsworth PA-C    ** Please Note: This note has been constructed using a voice recognition system   **

## 2018-08-18 NOTE — H&P
H&P Exam - Richie Tabor 41 y o  female MRN: [id number]    Unit/Bed#: [facility] 715-01 Encounter: [id number]    Assessment:  Altered mental status    Hepatic encephalopathy    Recent hypercapnia      Plan:  Admit for further evaluation of mental status change likely multifactorial including acute hepatic encephalopathy and recent hypercapnia/suspected sepsis  Commence on lactulose 30 cc q 8 hours hourly maintain 2-3 loose bowels today  Consult GI service for evaluation  Initiate fall precautions and provide one-to-one monitoring as needed  Has history of seizures but doubt this is due to status epilepticus  Neurology consult awaited and possible video EEG monitoring  Recent hypercapnia  Monitor respiratory status and request blood gas measurements of necessary  BiPAP for respiratory support as needed  Pulmonary consult if necessary  Other comorbidities  Seizure precautions  Routine medications and nasogastric tube feeding and supportive care  History of Present Illness   50-year-old  female with Lennox-Gastaut syndrome, seizure disorder, ADHD, anoxic brain injury a recent diagnosis of respiratory failure with hypercapnia presenting from long-term care nursing facility for evaluation of lethargy and mental status change  Compared to baseline  No current fever but with concerns for possible unremitting seizures  Currently admitted to the neurology floor to consider continuous EEG video monitoring  Patient is nonverbal requires one-to-one supervision with since a gastric feeding tube  Lab showed elevated transaminases ammonia level of 71  WBC 8 19 with normal differentials  Urinalysis negative  Arterial blood gas from August 16, 2018 shows pH of 7 36 pCO2 47  P02 85 bicarbonate 26  Jacquelyn Sinks PCO2 2 days before was 55  Review of Systems   Constitutional: Negative  HENT: Negative  Eyes: Negative  Respiratory: Negative  Cardiovascular: Negative  Gastrointestinal: Negative  Endocrine: Negative  Genitourinary: Negative  Musculoskeletal: Negative  Skin: Negative  Allergic/Immunologic: Negative  Neurological: Negative  Hematological: Negative  Psychiatric/Behavioral: Negative  Historical Information   Past Medical History:   Diagnosis Date    ADHD     Anoxic brain damage (HCC)     Autistic disorder     Hyperammonemia (HCC)     Hyperkeratosis     Hypotension     Intellectual disability     Intellectual disability     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with tonic seizures (HCC)     Lethargy     Liver enzyme elevation     Onychomycosis     Osteoporosis     Osteoporosis     Psychiatric disorder     anxiety     Past Surgical History:   Procedure Laterality Date    ABDOMINAL SURGERY      CARDIAC PACEMAKER PLACEMENT      JEJUNOSTOMY FEEDING TUBE      PEG TUBE PLACEMENT       Social History   History   Alcohol Use No     History   Drug Use No     History   Smoking Status    Never Smoker   Smokeless Tobacco    Never Used     Family History: non-contributory    Meds/Allergies   all medications and allergies reviewed  Allergies   Allergen Reactions    Felbamate        Objective   First Vitals:        Current Vitals:        No intake or output data in the 24 hours ending 08/18/18 0028    Invasive Devices     Peripheral Intravenous Line            Peripheral IV 08/16/18 Right Wrist 1 day          Drain            Gastrostomy/Enterostomy Gastrostomy 18 Fr   days          Nasogastric/Orogastric Tube            Feeding Tube 562 days                Physical Exam   Young  female not in any acute distress  She is normocephalic atraumatic she is afebrile trauma not pale, anicteric, not dehydrated  Neck is supple  Lungs are clear to auscultation  Heart sounds 1 and 2 regular  Abdomen is scaphoid with functioning is nasogastric tube  Bowel sounds are present  Musculoskeletal system/extremities  No pedal edema  Distal pulses present  Skin    No evidence of decubitus ulcers  CNS exam drowsy to lethargic  Lab Results:     Results from last 7 days  Lab Units 08/17/18  0607   WBC Thousand/uL 8 19   HEMOGLOBIN g/dL 12 9   HEMATOCRIT % 39 7   PLATELETS Thousands/uL 233   NEUTROS PCT % 58   LYMPHS PCT % 27   MONOS PCT % 11   EOS PCT % 3       Results from last 7 days  Lab Units 08/17/18  0607 08/16/18  1205 08/15/18  0535   SODIUM mmol/L 143 144 136   POTASSIUM mmol/L 3 8 4 6 3 3*   CHLORIDE mmol/L 110* 111* 104   CO2 mmol/L 25 26 25   BUN mg/dL 17 19 13   CREATININE mg/dL 0 61 0 50* 0 39*   CALCIUM mg/dL 8 5 8 5 8 5   TOTAL PROTEIN g/dL 6 2* 6 1* 5 9*   BILIRUBIN TOTAL mg/dL 0 40 0 40 0 50   ALK PHOS U/L 145* 146* 141*   ALT U/L 229* 251* 287*   AST U/L 153* 176* 274*   GLUCOSE RANDOM mg/dL 194* 120 98     Lab Results   Component Value Date    TROPONINI 0 03 11/30/2017         Results from last 7 days  Lab Units 08/15/18  0535   INR  1 31*     Urinalysis: No results found for: Prosper Saber, SPECGRAV, PHUR, LEUKOCYTESUR, NITRITE, PROTEINUA, GLUCOSEU, KETONESU, BILIRUBINUR, BLOODU  Lab Results   Component Value Date    BLOODCX No Growth After 4 Days  08/13/2018    BLOODCX No Growth After 4 Days  08/13/2018     Imaging:   EKG, Pathology, and Other Studies:     Code Status: Prior  Advance Directive and Living Will:      Power of :    POLST:      Counseling / Coordination of Care:   Greater than 50% of total time was spent with the patient and / or family counseling and / or coordination of care  A description of the counseling / coordination of care: 45 min  spent approximately 45 minutes in evaluation and care coordination of this patient  Admit for hepatic encephalopathy/mental status change and anticipate at least 2 midnight stay  Rosa Stovall and Neurology consult    Consider video EEG monitoring

## 2018-08-18 NOTE — ASSESSMENT & PLAN NOTE
· Pneumonia tends to fluctuate in this patient, Likely secondary to valproic acid  · Can continue lactulose, levocarnitine, Xifaxan although unclear if there is any benefit of continuing Xifaxin   · appreciate GI input

## 2018-08-18 NOTE — ASSESSMENT & PLAN NOTE
· Discussed in detail with Dr Vanessa Costa  While at [address] [place] [address] her Ashlie Ross was not on formulary, her phenobarb was reduced to 15 mg daily and her valproic acid was reduced to 250 mg TID  per discussion between hospitalist and patient's primary epileptologist  Patient has history of breakthrough seizures with any minor change in her medications  Seizures are very subtle and she has history of 4 ictal patterns    · Continue home meds med current doses which include non formulary rufinamide 1200mg BID, perampanel 8mg at bedtime, topamax 250mg BID, Keppra 1000mg q12, valproic acid 500mg q8, and phenobarb 20mg at bedtime   · Low threshold to initiate EMU at this time   · Follow-up AED levels, phenobarb level sent on 08/16/2018 normal 16  · Seizure precautions, prn ativan

## 2018-08-18 NOTE — ASSESSMENT & PLAN NOTE
· Patient presented with AMS and somnolence from nursing facility  She was admitted to [facility] 8/13 to 8/17  There was question of underlying pneumonia although PCT level was low and antibiotics were d/c  She was noted to have hyperammonemia although this is chronic  Found to have elevated LFTs and Depakote, phenobarbital doses were decreased  Fycompa was not on formulary and suspect patient was having subtle breakthrough seizures likely contributing to her somnolence  · Patient antiepileptics resumed at normal doses and phenobarb was decreased from 60 mg to 20 mg per Neurology  Pt appears more alert today     · Mental status at baseline waxes and wanes with intermittent periods of hyper and hypoactivity  · Continue to monitor mental status   · Continue one-to-one continuous observation

## 2018-08-18 NOTE — NURSING NOTE
Pt transported to B via stretcher by transport team/ Pt lethargic, in no acute distress  Report given to receiving RN  Verbal understanding of same

## 2018-08-18 NOTE — ASSESSMENT & PLAN NOTE
· Unknown etiology, patient started on phenobarbital in April 2018  LFTs back in March were normal   According to epileptologist, liver enzymes were also normal in May  There was no further lab work to assess LFTs apart from lab work on admission  · Possibly 2/2 AEDs, particularly phenobarbital   Discussed with epileptologist   Monitoring of phenobarbital levels in the outpatient setting has been relatively normal     · Gastroenterology following, appreciate input, noted to have elevated INR that is now stabilized  Suspect hepatocellular pattern and appear to be stable  Will trend AST/ALT, INR  · Right upper quadrant ultrasound was unremarkable  · Full liver workup recommended by GI including viral hepatitis panel, serology for autoimmune hepatitis, PBC, Ethan's disease, hemochromatosis and alpha 1 antitrypsin deficiency

## 2018-08-19 LAB
ALBUMIN SERPL BCP-MCNC: 2.3 G/DL (ref 3.5–5)
ALP SERPL-CCNC: 126 U/L (ref 46–116)
ALT SERPL W P-5'-P-CCNC: 169 U/L (ref 12–78)
ANION GAP SERPL CALCULATED.3IONS-SCNC: 7 MMOL/L (ref 4–13)
AST SERPL W P-5'-P-CCNC: 120 U/L (ref 5–45)
BILIRUB SERPL-MCNC: 0.55 MG/DL (ref 0.2–1)
BUN SERPL-MCNC: 12 MG/DL (ref 5–25)
CALCIUM SERPL-MCNC: 8.7 MG/DL (ref 8.3–10.1)
CHLORIDE SERPL-SCNC: 106 MMOL/L (ref 100–108)
CO2 SERPL-SCNC: 25 MMOL/L (ref 21–32)
CREAT SERPL-MCNC: 0.4 MG/DL (ref 0.6–1.3)
GFR SERPL CREATININE-BSD FRML MDRD: 133 ML/MIN/1.73SQ M
GLUCOSE SERPL-MCNC: 87 MG/DL (ref 65–140)
HBV CORE AB SER QL: NORMAL
HBV CORE IGM SER QL: NORMAL
HBV SURFACE AG SER QL: NORMAL
HCV AB SER QL: NORMAL
INR PPP: 1.12 (ref 0.86–1.17)
POTASSIUM SERPL-SCNC: 3.7 MMOL/L (ref 3.5–5.3)
PROT SERPL-MCNC: 6.4 G/DL (ref 6.4–8.2)
PROTHROMBIN TIME: 14.5 SECONDS (ref 11.8–14.2)
SODIUM SERPL-SCNC: 138 MMOL/L (ref 136–145)

## 2018-08-19 PROCEDURE — 80053 COMPREHEN METABOLIC PANEL: CPT | Performed by: PHYSICIAN ASSISTANT

## 2018-08-19 PROCEDURE — 86705 HEP B CORE ANTIBODY IGM: CPT | Performed by: PHYSICIAN ASSISTANT

## 2018-08-19 PROCEDURE — 86235 NUCLEAR ANTIGEN ANTIBODY: CPT | Performed by: PHYSICIAN ASSISTANT

## 2018-08-19 PROCEDURE — 86704 HEP B CORE ANTIBODY TOTAL: CPT | Performed by: PHYSICIAN ASSISTANT

## 2018-08-19 PROCEDURE — 99232 SBSQ HOSP IP/OBS MODERATE 35: CPT | Performed by: PHYSICIAN ASSISTANT

## 2018-08-19 PROCEDURE — 86256 FLUORESCENT ANTIBODY TITER: CPT | Performed by: PHYSICIAN ASSISTANT

## 2018-08-19 PROCEDURE — 82103 ALPHA-1-ANTITRYPSIN TOTAL: CPT | Performed by: PHYSICIAN ASSISTANT

## 2018-08-19 PROCEDURE — 85610 PROTHROMBIN TIME: CPT | Performed by: PHYSICIAN ASSISTANT

## 2018-08-19 PROCEDURE — 87340 HEPATITIS B SURFACE AG IA: CPT | Performed by: PHYSICIAN ASSISTANT

## 2018-08-19 PROCEDURE — 95970 ALYS NPGT W/O PRGRMG: CPT | Performed by: PSYCHIATRY & NEUROLOGY

## 2018-08-19 PROCEDURE — 86038 ANTINUCLEAR ANTIBODIES: CPT | Performed by: PHYSICIAN ASSISTANT

## 2018-08-19 PROCEDURE — 99233 SBSQ HOSP IP/OBS HIGH 50: CPT | Performed by: PSYCHIATRY & NEUROLOGY

## 2018-08-19 PROCEDURE — 86803 HEPATITIS C AB TEST: CPT | Performed by: PHYSICIAN ASSISTANT

## 2018-08-19 PROCEDURE — 82128 AMINO ACIDS MULT QUAL: CPT | Performed by: PSYCHIATRY & NEUROLOGY

## 2018-08-19 PROCEDURE — 82390 ASSAY OF CERULOPLASMIN: CPT | Performed by: PHYSICIAN ASSISTANT

## 2018-08-19 RX ORDER — LEVOCARNITINE 1 G/10ML
750 SOLUTION ORAL 3 TIMES DAILY
Status: DISCONTINUED | OUTPATIENT
Start: 2018-08-19 | End: 2018-08-24 | Stop reason: HOSPADM

## 2018-08-19 RX ORDER — ALBUMIN (HUMAN) 12.5 G/50ML
12.5 SOLUTION INTRAVENOUS ONCE
Status: COMPLETED | OUTPATIENT
Start: 2018-08-19 | End: 2018-08-20

## 2018-08-19 RX ADMIN — TOPIRAMATE 200 MG: 100 TABLET, FILM COATED ORAL at 08:41

## 2018-08-19 RX ADMIN — VALPROIC ACID 500 MG: 250 SOLUTION ORAL at 01:23

## 2018-08-19 RX ADMIN — Medication 250 MG: at 08:43

## 2018-08-19 RX ADMIN — ENOXAPARIN SODIUM 40 MG: 40 INJECTION SUBCUTANEOUS at 08:42

## 2018-08-19 RX ADMIN — RUFINAMIDE 1200 MG: 200 TABLET, FILM COATED ORAL at 08:40

## 2018-08-19 RX ADMIN — RUFINAMIDE 1200 MG: 200 TABLET, FILM COATED ORAL at 16:29

## 2018-08-19 RX ADMIN — LEVOCARNITINE 500 MG: 1 SOLUTION ORAL at 08:40

## 2018-08-19 RX ADMIN — MINERAL OIL AND WHITE PETROLATUM 1 APPLICATION: 30; 940 OINTMENT OPHTHALMIC at 17:26

## 2018-08-19 RX ADMIN — TOPIRAMATE 200 MG: 100 TABLET, FILM COATED ORAL at 22:05

## 2018-08-19 RX ADMIN — VALPROIC ACID 500 MG: 250 SOLUTION ORAL at 08:42

## 2018-08-19 RX ADMIN — PERAMPANEL 8 MG: 4 TABLET ORAL at 22:01

## 2018-08-19 RX ADMIN — MINERAL OIL AND WHITE PETROLATUM 1 APPLICATION: 30; 940 OINTMENT OPHTHALMIC at 22:07

## 2018-08-19 RX ADMIN — Medication 250 MG: at 16:27

## 2018-08-19 RX ADMIN — PHENOBARBITAL 20 MG: 20 LIQUID ORAL at 22:32

## 2018-08-19 RX ADMIN — LACTULOSE 30 G: 20 SOLUTION ORAL at 08:41

## 2018-08-19 RX ADMIN — VALPROIC ACID 500 MG: 250 SOLUTION ORAL at 16:26

## 2018-08-19 RX ADMIN — ALBUMIN HUMAN 12.5 G: 0.25 SOLUTION INTRAVENOUS at 18:05

## 2018-08-19 RX ADMIN — SODIUM CHLORIDE 500 ML: 0.9 INJECTION, SOLUTION INTRAVENOUS at 13:02

## 2018-08-19 RX ADMIN — MINERAL OIL AND WHITE PETROLATUM 1 APPLICATION: 30; 940 OINTMENT OPHTHALMIC at 08:40

## 2018-08-19 RX ADMIN — SENNOSIDES AND DOCUSATE SODIUM 1 TABLET: 8.6; 5 TABLET ORAL at 08:43

## 2018-08-19 RX ADMIN — LACTULOSE 30 G: 20 SOLUTION ORAL at 22:05

## 2018-08-19 RX ADMIN — POTASSIUM CHLORIDE 20 MEQ: 20 SOLUTION ORAL at 08:42

## 2018-08-19 RX ADMIN — LEVETIRACETAM 1000 MG: 100 SOLUTION ORAL at 08:40

## 2018-08-19 RX ADMIN — LEVOCARNITINE 750 MG: 1 SOLUTION ORAL at 22:01

## 2018-08-19 RX ADMIN — LEVOCARNITINE 750 MG: 1 SOLUTION ORAL at 16:26

## 2018-08-19 RX ADMIN — LACTULOSE 30 G: 20 SOLUTION ORAL at 16:26

## 2018-08-19 RX ADMIN — LEVETIRACETAM 1000 MG: 100 SOLUTION ORAL at 22:01

## 2018-08-19 NOTE — ASSESSMENT & PLAN NOTE
· Ammonia tends to fluctuate in this patient, Likely secondary to valproic acid  · Can continue lactulose, levocarnitine, Xifaxan although unclear if there is any benefit of continuing Xifaxin   · Can consider inc levocarnitine to 1000mg TID per epileptologist   · Appreciate GI input   · Per epileptologist, check urine organic acid, plasma amino acid, plasma acylcarnitine levels, carnitine level

## 2018-08-19 NOTE — SOCIAL WORK
Pt is a resident of [facility]  CM left VM at [facility] in order to gather baseline information

## 2018-08-19 NOTE — PROGRESS NOTES
500 NS bolus finished  Pt manual bp cont in 70's  baseline is   Pt lethargic but responsive, interacts and looks around while being changed  Pt resists eye drop administration  Bimal HADADD with FERNANDO notified via phone  OT IV albumin ordered  Will admin when available   Will cont to monitor

## 2018-08-19 NOTE — PROCEDURES
Neurology/Epilepsy Procedure Note    VNS Interrogation    Model: [facility] [place] M105  S/N: [id number]  Date of implant: 11/3/2015    Current settings:  Output Current 2 mAmp   Signal Frequency 30 Hz   Pulse Width 500 microsec   Signal On Time 21 sec   Signal Off Time 0 8 min     Magnet settings:  Mag Output 2 25 mAmp   Pulse Width 500 microsec   Mag On Time 60 sec     Diagnostics:  Communication OK  Output current 2 mA  Lead Impedance OK  Impedance value 1715  IFI no  Battery indicator 25-50%    There is no reprogramming    Lifetime Magnet activation 7

## 2018-08-19 NOTE — PROGRESS NOTES
Progress Note - Imagene Grave 1980, 41 y o  female MRN: [id number]  [facility] Encounter: [id number] DOS: 8/19/18  Primary Care Provider: Claudio Dumont DO   Date and time admitted to hospital: 8/17/2018  9:28 PM    * Acute encephalopathy   Assessment & Plan    · Patient presented with AMS and somnolence from nursing facility  She was admitted to [facility] 8/13 to 8/17  There was question of underlying pneumonia although PCT level was low and antibiotics were d/c  She was noted to have hyperammonemia although this is chronic  Found to have elevated LFTs and Depakote, phenobarbital doses were decreased  Fycompa was not on formulary and suspect patient was having subtle breakthrough seizures likely contributing to her somnolence  · Patient antiepileptics resumed at normal doses and phenobarb was decreased from 60 mg to 20 mg per Neurology  · Mental status at baseline waxes and wanes with intermittent periods of hyper and hypoactivity  · Continue to monitor mental status   · Continue one-to-one continuous observation        Transaminitis   Assessment & Plan    · Unknown etiology, patient started on phenobarbital in April 2018  LFTs back in March were normal   According to epileptologist, liver enzymes were also normal in May  There was no further lab work to assess LFTs apart from lab work on admission  · Possibly 2/2 AEDs, particularly phenobarbital   Discussed with epileptologist    · Gastroenterology following, appreciate input, noted to have elevated INR that is now stabilized  Suspect hepatocellular pattern and appear to be improving  Will trend AST/ALT, INR  · Right upper quadrant ultrasound was unremarkable  · Full liver workup recommended by GI including viral hepatitis panel, serology for autoimmune hepatitis, PBC, Ethan's disease, hemochromatosis and alpha 1 antitrypsin deficiency            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with tonic seizures ([facility] [address] )   Assessment & Plan    · Discussed in detail with Dr Chetan Rudd  While at [facility] her Mer Montanez was not on formulary, her phenobarb was reduced to 15 mg daily and her valproic acid was reduced to 250 mg TID  per discussion between hospitalist and patient's primary epileptologist  Patient has history of breakthrough seizures with any minor change in her medications  Seizures are very subtle and she has history of 4 ictal patterns  · Continue home meds med current doses which include non formulary rufinamide 1200mg BID, perampanel dec to 8mg at bedtime, topamax dec to 200mg BID, Keppra 1000mg q12, valproic acid 500mg q8, and phenobarb dec to 20mg at bedtime   · Low threshold to initiate EMU at this time   · Follow-up AED levels tomorrow, phenobarb level sent on 08/16/2018 normal 16  · Seizure precautions, prn ativan         Hyperammonemia (HCC)   Assessment & Plan    · Ammonia tends to fluctuate in this patient, Likely secondary to valproic acid  · Can continue lactulose, levocarnitine, Xifaxan although unclear if there is any benefit of continuing Xifaxin   · Can consider inc levocarnitine to 1000mg TID per epileptologist   · Appreciate GI input   · Per epileptologist, check urine organic acid, plasma amino acid, plasma acylcarnitine levels, carnitine level         Intellectual disability   Assessment & Plan    · Supportive care        Dysphagia   Assessment & Plan    · Patient had 1 episode of vomiting 8/16   · Tolerating tube feeds          VTE Pharmacologic Prophylaxis:   Pharmacologic: Enoxaparin (Lovenox)  Mechanical VTE Prophylaxis in Place: Yes    Patient Centered Rounds: I have performed bedside rounds with nursing staff today  Discussions with Specialists or Other Care Team Provider: carol, d/w Dr Chetan Rudd yesterday     Education and Discussions with Family / Patient: patient     Time Spent for Care: 30 minutes  More than 50% of total time spent on counseling and coordination of care as described above      Current Length of Stay: 2 day(s)    Current Patient Status: Inpatient   Certification Statement: The patient will continue to require additional inpatient hospital stay due to acute encephalopathy, transaminitis     Discharge Plan / Estimated Discharge Date: pending clinical course     Code Status: Level 1 - Full Code    Subjective:   Pt seen and examined  Nonverbal  Not participating  Objective:     Vitals:   Temp (24hrs), Av 1 °F (36 2 °C), Min:96 1 °F (35 6 °C), Max:98 2 °F (36 8 °C)    HR:  [57-97] 80  Resp:  [15-18] 15  BP: (74-91)/(47-56) 74/50  SpO2:  [95 %-96 %] 96 %  Body mass index is 19 99 kg/m²  Input and Output Summary (last 24 hours): Intake/Output Summary (Last 24 hours) at 18 1250  Last data filed at 18 0647   Gross per 24 hour   Intake           1117 5 ml   Output                0 ml   Net           1117 5 ml     Physical Exam:     Physical Exam   Constitutional: She appears well-developed  Appears younger than stated age  Laying on right side  HENT:   Head: Normocephalic and atraumatic  Mouth/Throat: Oropharynx is clear and moist    Eyes: EOM are normal  No scleral icterus  Cardiovascular: Normal rate, regular rhythm and normal heart sounds  No murmur heard  Pulmonary/Chest: Effort normal and breath sounds normal  No respiratory distress  Abdominal: Soft  Bowel sounds are normal    Musculoskeletal: She exhibits no edema  Neurological: She is alert  awake and alert, nonverbal     Psychiatric: She has a normal mood and affect  Nursing note and vitals reviewed      Additional Data:    Labs:      Results from last 7 days  Lab Units 18  0507   WBC Thousand/uL 9 79   HEMOGLOBIN g/dL 12 8   HEMATOCRIT % 39 0   PLATELETS Thousands/uL 260  255   NEUTROS PCT % 49   LYMPHS PCT % 34   MONOS PCT % 11   EOS PCT % 5       Results from last 7 days  Lab Units 18  0558   SODIUM mmol/L 138   POTASSIUM mmol/L 3 7   CHLORIDE mmol/L 106   CO2 mmol/L 25   BUN mg/dL 12   CREATININE mg/dL 0 40*   CALCIUM mg/dL 8 7   TOTAL PROTEIN g/dL 6 4   BILIRUBIN TOTAL mg/dL 0 55   ALK PHOS U/L 126*   ALT U/L 169*   AST U/L 120*   GLUCOSE RANDOM mg/dL 87       Results from last 7 days  Lab Units 08/19/18  0558   INR  1 12       * I Have Reviewed All Lab Data Listed Above  * Additional Pertinent Lab Tests Reviewed: Maddie 67 Admission Reviewed    Imaging:    Imaging Reports Reviewed Today Include: all  Imaging Personally Reviewed by Myself Includes:  none    Recent Cultures (last 7 days):       Results from last 7 days  Lab Units 08/13/18  1736 08/13/18  0424   BLOOD CULTURE   --  No Growth After 5 Days  No Growth After 5 Days     LEGIONELLA URINARY ANTIGEN  Negative  --        Last 24 Hours Medication List:     Current Facility-Administered Medications:  acetaminophen 650 mg Per G Tube Q6H PRN Jennifer Jordan MD   artificial tear 1 application Ophthalmic TID Jennifer Jordan MD   bisacodyl 10 mg Rectal Daily PRN Jennifer Jordan MD   enoxaparin 40 mg Subcutaneous Daily Jennifer Jordan MD   lactulose 30 g Per G Tube TID Jennifer Jordan MD   levETIRAcetam 1,000 mg Per G Tube Q12H [facility] Jennifer Jordan MD   levOCARNitine 500 mg Per G Tube TID Jennifer Jordan MD   magnesium hydroxide 30 mL Oral Daily PRN Jennifer Jordan MD   ondansetron 4 mg Intravenous Q6H PRN Jennifer Jordan MD   ondansetron 4 mg Oral Q6H PRN Jennifer Jordan MD   Perampanel 8 mg Per G Tube HS Arielle Pickett MD   PHENobarbital 20 mg Oral HS Arielle Pickett MD   potassium chloride 20 mEq Per G Tube Daily Jennifer Jordan MD   rufinamide 1,200 mg Per G Tube BID Jennifer Jordan MD   saccharomyces boulardii 250 mg Per NG Tube BID Jennifer Jordan MD   senna-docusate sodium 1 tablet Per G Tube Daily Jennifer Jordan MD   sodium chloride 500 mL Intravenous Once Rajiv Singer PA-C   sodium phosphate-biphosphate 1 enema Rectal Daily PRN Jennifer Jordan MD   topiramate 200 mg Oral Q12H Dannie Zhao MD valproic acid 500 mg Per Jeremias Anders MD        Today, Patient Was Seen By: Larry Traore PA-C    ** Please Note: This note has been constructed using a voice recognition system   **

## 2018-08-19 NOTE — ASSESSMENT & PLAN NOTE
· Patient presented with AMS and somnolence from nursing facility  She was admitted to [facility] 8/13 to 8/17  There was question of underlying pneumonia although PCT level was low and antibiotics were d/c  She was noted to have hyperammonemia although this is chronic  Found to have elevated LFTs and Depakote, phenobarbital doses were decreased  Fycompa was not on formulary and suspect patient was having subtle breakthrough seizures likely contributing to her somnolence  · Patient antiepileptics resumed at normal doses and phenobarb was decreased from 60 mg to 20 mg per Neurology     · Mental status at baseline waxes and wanes with intermittent periods of hyper and hypoactivity  · Continue to monitor mental status   · Continue one-to-one continuous observation

## 2018-08-19 NOTE — ASSESSMENT & PLAN NOTE
· Discussed in detail with Dr Samia Hilario  While at [facility] her Fang Mendez was not on formulary, her phenobarb was reduced to 15 mg daily and her valproic acid was reduced to 250 mg TID  per discussion between hospitalist and patient's primary epileptologist  Patient has history of breakthrough seizures with any minor change in her medications  Seizures are very subtle and she has history of 4 ictal patterns    · Continue home meds med current doses which include non formulary rufinamide 1200mg BID, perampanel dec to 8mg at bedtime, topamax dec to 200mg BID, Keppra 1000mg q12, valproic acid 500mg q8, and phenobarb dec to 20mg at bedtime   · Low threshold to initiate EMU at this time   · Follow-up AED levels tomorrow, phenobarb level sent on 08/16/2018 normal 16  · Seizure precautions, prn ativan

## 2018-08-19 NOTE — PROGRESS NOTES
Neurology/Epilepsy Progress Note  Patient Name:  Pam Rey  : 1980  MRN: [id number]  Encounter: [id number]  Room: [place] [address] [address]  Date of admission: 2018  Date: 18    CC: unable to obtain from patient, ongoing medical management of epilepsy with medication side effects  Interval History: There is fluctuating degree of wakefulness throughout the day  Earlier in the day, she was able to somewhat participate in getting cleaned up  The PCA reported that she did roll on her own when she was lightly pushed so that the PCA can wash her  [facility] appeared to pull up the blanket on her own to cover up her body  She did sit up for a brief period of time  In the afternoon, she appeared to be sleeping more  No convulsive seizures were reported (she has subtle tonic seizures with eyes rolling up and slight upper body jerks when she is asleep)      Current AEDs:  Valproic acid 250mg/5mL give 10mL (500mg) three times a day   Banzel 1200mg twice a day   Topiramate 200mg tab twice a day   Levetiracetam 100mg/mL give 10mL twice a day   Fycompa 8mg at bedtime  Phenobarbital 20mg/5mL give 5mL (20mg) daily    Additional Past Medical/Surgical History:  No additional medical history is obtained from the patient    PFSHx:  [x] Family and Social history is unchanged from HPI Note  [facility], [phone]    Current Medications:    Current Facility-Administered Medications:  acetaminophen 650 mg Per G Tube Q6H PRN Sonido Garcia MD   artificial tear 1 application Ophthalmic TID Sonido Garcia MD   bisacodyl 10 mg Rectal Daily PRN Sonido Garcia MD   enoxaparin 40 mg Subcutaneous Daily Sonido Garcia MD   lactulose 30 g Per G Tube TID Sonido Garcia MD   levETIRAcetam 1,000 mg Per G Tube Q12H [facility] Sonido Garcia MD   levOCARNitine 500 mg Per G Tube TID Sonido Garcia MD   magnesium hydroxide 30 mL Oral Daily PRN Sonido Garcia MD   ondansetron 4 mg Intravenous Q6H PRN Amadeo Mckeon MD   ondansetron 4 mg Oral Q6H PRN Amadeo Mckeon MD   Perampanel 8 mg Per G Tube HS Madalyn Frances MD   PHENobarbital 20 mg Oral HS Madalyn Frances MD   potassium chloride 20 mEq Per G Tube Daily Amadeo Mckeon MD   rufinamide 1,200 mg Per G Tube BID Amadeo Mckeon MD   saccharomyces boulardii 250 mg Per NG Tube BID Amadeo Mckeon MD   senna-docusate sodium 1 tablet Per G Tube Daily Amadeo Mckeon MD   sodium phosphate-biphosphate 1 enema Rectal Daily PRN Amadeo Mckeon MD   topiramate 200 mg Oral Q12H [facility] Madalyn Frances MD   valproic acid 500 mg Per G Tube Q8H Amadeo Mckeon MD       PRN medications:     acetaminophen    bisacodyl    magnesium hydroxide    ondansetron    ondansetron    sodium phosphate-biphosphate    Allergies: Allergies   Allergen Reactions    Felbamate        Review of Systems  ROS:  (Extended=2-9; brief = 1)    Review of systems not obtained due to patient factors (somnolence and severe intellectual disability)    Physical Exam:  EXAMINATION   (any 12=Level 3; any 6=Level 2; <6 = Level 1)  Elaborate all abnormal findings  General exam   Blood pressure (!) 74/50, pulse 80, temperature (!) 97 °F (36 1 °C), temperature source Axillary, resp  rate 15, height 4' 9" (1 448 m), weight 41 9 kg (92 lb 6 oz), SpO2 96 %     Appearance: somnolent, opens eyes to tactile stimulation, will try to push away hand  HENT: atraumatic, lips are dry, no lymphadenopathy, anicteric, neck is supple  Cardiovascular: regular rate and rhythm and normal heart sounds  Pulmonary: normal inspiratory effort and clear to auscultation  Abdominal: soft, nontender, nondistended, there is G-tube in her abdomen  Extremities: normal color, temperature, peripheral pulses intact, there is no edema  Neuro: somnolent, no verbal response, does not follow commands, patient withdraws her feet from noxious tactile stimuation  Cranial Nerves: EOMI, face appears to be symmetric  Motor/Musculoskeletal: no lateralizing weakness, thin muscle build, diffuse flaccid muscle tone  Reflexes: left toe up going, right toe mute,     [] I personally reviewed the labs/radiological images/ EEG tracing  Labs:  Component       AST (SGOT) ALT ALK PHOS   Latest Ref Rng & Units       5 - 45 U/L 12 - 78 U/L 46 - 116 U/L   3/4/2018       44 58 128 (H)   2018       216 (H) 210 (H) 130 (H)   2018       295 (H) 246 (H) 119 (H)   8/15/2018       274 (H) 287 (H) 141 (H)   2018       176 (H) 251 (H) 146 (H)   2018       153 (H) 229 (H) 145 (H)   2018       127 (H) 186 (H) 132 (H)   2018       120 (H) 169 (H) 126 (H)     Component Ammonia   Latest Ref Rng & Units 11 - 35 umol/L   3/4/2018 29   2018 40 (H)   8/15/2018 46 (H)   2018 73 (H)   2018 71 (H)     Component      Latest Ref Rng & Units 2018 2018 2018 6/15/2018   VALPROIC ACID TOTAL      50 - 100 ug/mL 50 1 56 35 79   PHENOBARBITAL LEVEL      15 0 - 40 0 ug/mL 21 0 16 0 14    -2018  Valproic acid free 10 7  Valproic acid total 35  Levetiracetam 38 2  Topiramate 6 1  Phenobarbital 14     Labs pendin2018  GI labs - RAFITA, antimitochondrial antibody, Anti-smooth muscle antibody, chronic hepatitis panel, ceruloplasmin, Alpha-1-antitrypsin  Pending - plasma: carnitine level, acylcarnitines, amino acids; urine organic acids    Radiology:  8/15/2018 - CT head  No acute intracranial abnormality    Continuous video EEG:  None during this admission    Assessment:  Ms Jama Richards is a 41 y o  woman with intractable symptomatic generalized epilepsy, Lennox-Gastaut syndrome, and 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 (variable duration of wakefulness and sleep) presenting to hospital with increased sedation, somnolence, and hypercapnia  In the setting of elevated serum amino transferase and hyperammonemia    She chronically has hyperammonemia likely due to combination of valproic acid and topiramate  Most recently she was started on phenobarbital   Prior to starting phenobarbital her hepatic function tests were normal, if phenobarbital was the only new medication in the last 3-4 months it is likely that it has caused some degree of hepatic toxicity  Which is unfortunate in that her seizure control has dramatically improved with phenobarbital   Hypersomnolence from medications is likely due to her polypharmacy however it is difficult to quickly discontinue multiple medications at the same time given that she is at extremely high risk for recurrent multiple seizures within a day  Ms Trevor Christensen management of her epilepsy syndrome is extremely complicated by being on six medications, medication interactions, and side effects  Awaiting results of labs to assess for intrinsic liver disease and inborn errors of metabolism that could contribute to hyperammonemia  There are rare cases of late onset inborn errors of metabolism that is associated with hyperammonemia that could be triggered by medications or metabolic stress  At least the AST/ALT are trending lower  We will continue her on a lower dose of phenobarbital given the risk of withdrawal seizures related to this medication  We may consider primidone as a replacement of phenobarbital, looking at the [address] [place] [address] site, it appears that predominant not associated with serum amino transferase elevations and liver injury from primidone is exceedingly rare  She should continue with levocarnitine, which may be helpful in counter acting valproic acid induce hyperammonemia      Recommendations:  Epilepsy:  - continue valproic acid 500 mg 3 times a day  - continue topiramate 200 mg every 12 hours  - continue phenobarbital 20 mg at bedtime  - continue perampanel 8 mg at bedtime  - continue rufinamide 1200 mg twice a day  - continue levetiracetam 1000mg twice a day  - will repeat drug levels in the morning: topiramate, valproic acid total and free, levetiracetam, phenobarbital; if valproic acid level is relatively higher than prior levels may reduce dose  - will hold off on video EEG monitoring     Hyperammonianemia / abnormal LFTs:  - Pending urine organic acid, plasma amino acid, plasma acylcarnitine levels, carnitine level  - continue to follow LFTs and ammonia  - Increase Levocarnitine to 750mg three times a day (may consider going up to 1000mg three times a day)  - continue with Lactulose      [x] Decision making was of high-complexity due to the patient's high risk condition (seizures), psychiatric and neuropsychological comorbidities, behavioral problems, memory and cognitive problems and medication side effects        [] Counseling and/or Coordination of Care: (time_____  Start:          End:          )   (>50% of Total Floor Time: Spent with Patient/Family)  Points of discussion:  [] Diagnosis    [] AED adverse effects    [] Compliance   [] Driving   [] Seizure safety    [] Surgery    [] VNS  [] Contraception       [] Pregnancy   [] Hormones                    [] Bone health

## 2018-08-19 NOTE — ASSESSMENT & PLAN NOTE
· Unknown etiology, patient started on phenobarbital in April 2018  LFTs back in March were normal   According to epileptologist, liver enzymes were also normal in May  There was no further lab work to assess LFTs apart from lab work on admission  · Possibly 2/2 AEDs, particularly phenobarbital   Discussed with epileptologist    · Gastroenterology following, appreciate input, noted to have elevated INR that is now stabilized  Suspect hepatocellular pattern and appear to be improving  Will trend AST/ALT, INR  · Right upper quadrant ultrasound was unremarkable  · Full liver workup recommended by GI including viral hepatitis panel, serology for autoimmune hepatitis, PBC, Ethan's disease, hemochromatosis and alpha 1 antitrypsin deficiency

## 2018-08-20 PROBLEM — I95.9 HYPOTENSION: Status: ACTIVE | Noted: 2018-08-20

## 2018-08-20 LAB
ALBUMIN SERPL BCP-MCNC: 2.6 G/DL (ref 3.5–5)
ALBUMIN SERPL BCP-MCNC: 2.7 G/DL (ref 3.5–5)
ALP SERPL-CCNC: 114 U/L (ref 46–116)
ALP SERPL-CCNC: 114 U/L (ref 46–116)
ALT SERPL W P-5'-P-CCNC: 167 U/L (ref 12–78)
ALT SERPL W P-5'-P-CCNC: 171 U/L (ref 12–78)
AMMONIA PLAS-SCNC: 78 UMOL/L (ref 11–35)
ANION GAP SERPL CALCULATED.3IONS-SCNC: 8 MMOL/L (ref 4–13)
AST SERPL W P-5'-P-CCNC: 160 U/L (ref 5–45)
AST SERPL W P-5'-P-CCNC: 166 U/L (ref 5–45)
BILIRUB DIRECT SERPL-MCNC: 0.28 MG/DL (ref 0–0.2)
BILIRUB SERPL-MCNC: 0.57 MG/DL (ref 0.2–1)
BILIRUB SERPL-MCNC: 0.73 MG/DL (ref 0.2–1)
BUN SERPL-MCNC: 14 MG/DL (ref 5–25)
CALCIUM SERPL-MCNC: 8.8 MG/DL (ref 8.3–10.1)
CHLORIDE SERPL-SCNC: 109 MMOL/L (ref 100–108)
CO2 SERPL-SCNC: 24 MMOL/L (ref 21–32)
CREAT SERPL-MCNC: 0.5 MG/DL (ref 0.6–1.3)
GFR SERPL CREATININE-BSD FRML MDRD: 124 ML/MIN/1.73SQ M
GLUCOSE SERPL-MCNC: 81 MG/DL (ref 65–140)
PHENOBARB SERPL-MCNC: 12 UG/ML (ref 15–40)
POTASSIUM SERPL-SCNC: 3.8 MMOL/L (ref 3.5–5.3)
PROT SERPL-MCNC: 6.5 G/DL (ref 6.4–8.2)
PROT SERPL-MCNC: 6.5 G/DL (ref 6.4–8.2)
RYE IGE QN: NEGATIVE
SODIUM SERPL-SCNC: 141 MMOL/L (ref 136–145)
VALPROATE FREE SERPL-MCNC: 31.5 UG/ML (ref 6–22)
VALPROATE SERPL-MCNC: 73 UG/ML (ref 50–100)

## 2018-08-20 PROCEDURE — 99232 SBSQ HOSP IP/OBS MODERATE 35: CPT | Performed by: PHYSICIAN ASSISTANT

## 2018-08-20 PROCEDURE — 99232 SBSQ HOSP IP/OBS MODERATE 35: CPT | Performed by: PSYCHIATRY & NEUROLOGY

## 2018-08-20 PROCEDURE — 80076 HEPATIC FUNCTION PANEL: CPT | Performed by: PSYCHIATRY & NEUROLOGY

## 2018-08-20 PROCEDURE — 80177 DRUG SCRN QUAN LEVETIRACETAM: CPT | Performed by: PSYCHIATRY & NEUROLOGY

## 2018-08-20 PROCEDURE — 80164 ASSAY DIPROPYLACETIC ACD TOT: CPT | Performed by: PSYCHIATRY & NEUROLOGY

## 2018-08-20 PROCEDURE — 80053 COMPREHEN METABOLIC PANEL: CPT | Performed by: PHYSICIAN ASSISTANT

## 2018-08-20 PROCEDURE — 99232 SBSQ HOSP IP/OBS MODERATE 35: CPT | Performed by: INTERNAL MEDICINE

## 2018-08-20 PROCEDURE — 80201 ASSAY OF TOPIRAMATE: CPT | Performed by: PSYCHIATRY & NEUROLOGY

## 2018-08-20 PROCEDURE — 82140 ASSAY OF AMMONIA: CPT | Performed by: PSYCHIATRY & NEUROLOGY

## 2018-08-20 PROCEDURE — 80184 ASSAY OF PHENOBARBITAL: CPT | Performed by: PSYCHIATRY & NEUROLOGY

## 2018-08-20 PROCEDURE — 80165 DIPROPYLACETIC ACID FREE: CPT | Performed by: PSYCHIATRY & NEUROLOGY

## 2018-08-20 RX ORDER — VALPROIC ACID 250 MG/5ML
500 SOLUTION ORAL
Status: DISCONTINUED | OUTPATIENT
Start: 2018-08-20 | End: 2018-08-22

## 2018-08-20 RX ORDER — VALPROIC ACID 250 MG/5ML
250 SOLUTION ORAL
Status: DISCONTINUED | OUTPATIENT
Start: 2018-08-20 | End: 2018-08-22

## 2018-08-20 RX ADMIN — VALPROIC ACID 500 MG: 250 SOLUTION ORAL at 01:20

## 2018-08-20 RX ADMIN — MINERAL OIL AND WHITE PETROLATUM 1 APPLICATION: 30; 940 OINTMENT OPHTHALMIC at 17:10

## 2018-08-20 RX ADMIN — LEVETIRACETAM 1000 MG: 100 SOLUTION ORAL at 21:48

## 2018-08-20 RX ADMIN — PERAMPANEL 8 MG: 4 TABLET ORAL at 21:52

## 2018-08-20 RX ADMIN — POTASSIUM CHLORIDE 20 MEQ: 20 SOLUTION ORAL at 09:21

## 2018-08-20 RX ADMIN — LACTULOSE 30 G: 20 SOLUTION ORAL at 09:20

## 2018-08-20 RX ADMIN — RUFINAMIDE 1200 MG: 200 TABLET, FILM COATED ORAL at 09:26

## 2018-08-20 RX ADMIN — LEVETIRACETAM 1000 MG: 100 SOLUTION ORAL at 09:25

## 2018-08-20 RX ADMIN — LACTULOSE 30 G: 20 SOLUTION ORAL at 21:46

## 2018-08-20 RX ADMIN — VALPROIC ACID 250 MG: 500 SOLUTION ORAL at 14:56

## 2018-08-20 RX ADMIN — Medication 250 MG: at 17:10

## 2018-08-20 RX ADMIN — VALPROIC ACID 500 MG: 250 SOLUTION ORAL at 09:23

## 2018-08-20 RX ADMIN — LEVOCARNITINE 750 MG: 1 SOLUTION ORAL at 09:25

## 2018-08-20 RX ADMIN — MINERAL OIL AND WHITE PETROLATUM 1 APPLICATION: 30; 940 OINTMENT OPHTHALMIC at 21:52

## 2018-08-20 RX ADMIN — TOPIRAMATE 200 MG: 100 TABLET, FILM COATED ORAL at 09:24

## 2018-08-20 RX ADMIN — LEVOCARNITINE 750 MG: 1 SOLUTION ORAL at 21:48

## 2018-08-20 RX ADMIN — ENOXAPARIN SODIUM 40 MG: 40 INJECTION SUBCUTANEOUS at 09:21

## 2018-08-20 RX ADMIN — Medication 250 MG: at 09:24

## 2018-08-20 RX ADMIN — MINERAL OIL AND WHITE PETROLATUM 1 APPLICATION: 30; 940 OINTMENT OPHTHALMIC at 09:25

## 2018-08-20 RX ADMIN — SENNOSIDES AND DOCUSATE SODIUM 1 TABLET: 8.6; 5 TABLET ORAL at 09:24

## 2018-08-20 RX ADMIN — LACTULOSE 30 G: 20 SOLUTION ORAL at 17:10

## 2018-08-20 RX ADMIN — PHENOBARBITAL 20 MG: 20 LIQUID ORAL at 21:47

## 2018-08-20 RX ADMIN — TOPIRAMATE 200 MG: 100 TABLET, FILM COATED ORAL at 21:47

## 2018-08-20 RX ADMIN — VALPROIC ACID 500 MG: 500 SOLUTION ORAL at 21:47

## 2018-08-20 RX ADMIN — LEVOCARNITINE 750 MG: 1 SOLUTION ORAL at 17:11

## 2018-08-20 RX ADMIN — RUFINAMIDE 1200 MG: 200 TABLET, FILM COATED ORAL at 21:48

## 2018-08-20 NOTE — PROGRESS NOTES
Epilepsy Consult Daily Progress Note   Nancy BENNETT 86 y o  female   : 1980  MRN: [id number]     Unit/Bed#: [facility] 703-01    Encounter: [id number]  -------------------------------------------------------------------------------------------------------------------  S: Interval history:  No significant events were reported  She continues to be quite sleepy  She will awaken when stimulated, but often will fall right back to sleep  There have not been any witnessed seizures reported  -------------------------------------------------------------------------------------------------------------------    ROS:  A 12 system review of system was not able to be obtained due to intellectual disability      All other review of systems negative   -------------------------------------------------------------------------------------------------------------------  Allergies:    Allergies   Allergen Reactions    Felbamate       Scheduled Meds:   Current Facility-Administered Medications:  acetaminophen 650 mg Per G Tube Q6H PRN Prmarcell Mills MD   artificial tear 1 application Ophthalmic TID Prudeenio Mills MD   bisacodyl 10 mg Rectal Daily PRN Danni Mills MD   enoxaparin 40 mg Subcutaneous Daily Danni Mills MD   lactulose 30 g Per G Tube TID Danni Mills MD   levETIRAcetam 1,000 mg Per G Tube Q12H [address] Danni Mills MD   levOCARNitine 750 mg Per G Tube TID Geremias Valenzuela MD   magnesium hydroxide 30 mL Oral Daily PRN Danni Mills MD   ondansetron 4 mg Intravenous Q6H PRN Danni Mills MD   ondansetron 4 mg Oral Q6H PRN Danni Mills MD   Perampanel 8 mg Per G Tube HS Geremias Valenzuela MD   PHENobarbital 20 mg Oral HS Geremias Valenzuela MD   potassium chloride 20 mEq Per G Tube Daily Prudeenio Mills MD   rufinamide 1,200 mg Per G Tube BID Prmarcell Mills MD   saccharomyces boulardii 250 mg Per NG Tube BID Prudeenio Mills MD   senna-docusate sodium 1 tablet Per G Tube Daily Kalani Cherry MD   sodium phosphate-biphosphate 1 enema Rectal Daily PRN Kalani Cherry MD   topiramate 200 mg Oral Q12H [address] Milly Collins MD   valproic acid 250 mg Per G Tube 2 times per day Tiffany Sarmiento MD   valproic acid 500 mg Oral Daily Tiffany Sarmiento MD     PRN Meds:   acetaminophen    bisacodyl    magnesium hydroxide    ondansetron    ondansetron    sodium phosphate-biphosphate  -------------------------------------------------------------------------------------------------------------------  Physical Exam:  Vitals: Blood pressure (!) 82/60, pulse 70, temperature 97 5 °F (36 4 °C), temperature source Axillary, resp  rate 16, height 4' 9" (1 448 m), weight 41 5 kg (91 lb 7 9 oz), SpO2 97 %  Neurologic Exam:  Asleep, but arousable to mild painful stimulation with nailbed pressure to hand  She aroused and rolled over slightly in bed, moved her arms and legs symmetrically, and then quickly fell back asleep  She did not verbalize or follow any commands  Cranial nerves:   Pupils equally round and reactive  Motor:   Moves all extremities to stimulation  Sensation:  Responds to tactile stimulation in all extremities     -------------------------------------------------------------------------------------------------------------------  LAB & TEST RESULTS:  Recent Labs      08/18/18   0507   WBC  9 79   HGB  12 8   HCT  39 0   PLT  260  255     Recent Labs      08/18/18 0507 08/19/18   0558  08/20/18   0446   NA  142  138  141   K  3 8  3 7  3 8   CL  107  106  109*   CO2  25  25  24   BUN  14  12  14   CREATININE  0 46*  0 40*  0 50*   CALCIUM  8 5  8 7  8 8   MG  2 3   --    --      Recent Labs      08/18/18   0507  08/19/18   0558  08/20/18   0446   ALB  2 4*  2 3*  2 6*  2 7*   ALKPHOS  132*  126*  114  114   ALT  186*  169*  171*  167*   AST  127*  120*  166*  160*     Recent Labs      08/19/18   0558   INR  1 12     Ammonia   Date Value Ref Range Status   08/20/2018 78 (H) 11 - 35 umol/L Final     Comment:       Specimen collection should occur prior to Sulfasalazine administration due to the potential for falsely elevated results  Specimen collection should occur prior to Sulfapyridine administration due to the potential for falsely depressed results  08/17/2018 71 (H) 11 - 35 umol/L Final     Comment:       Specimen collection should occur prior to Sulfapyridine administration due to the potential for falsely depressed results  08/16/2018 73 (H) 11 - 35 umol/L Final     Comment:       Specimen collection should occur prior to Sulfapyridine administration due to the potential for falsely depressed results  -------------------------------------------------------------------------------------------------------------------  Assessment/Plan:  Karyle Pillion is a 41 y o  female with intractable symptomatic generalized epilepsy, Lennox-Gastaut syndrome, and 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 (variable duration of wakefulness and sleep) presenting to hospital with increased sedation, somnolence, and hypercapnia  She continues to be quite sleepy, and quickly falls asleep on examination again today  This likely is due to a combination of sedation from medications and hyperammonemia  Her Valproate level is higher than it had been in the past, so it may be best to decrease her Valproate dose to try to get her levels in her typical range  Her LFTs have been trending downward  Recommendations:   Epilepsy:    - continue Topiramate 200 mg twice a day, phenobarbital 20 mg nightly, perampanel 8 mg nightly, rufinamide 1200 mg twice a day, and Levetiracetam 89969 mg twice a day  - will decrease her Valproate to 250 mg in the am, 250 mg in the afternoon, and 500 mg nightly to try to improve her sedation    - Will recheck drug levels later this week and await recent free valproate level  - Will need to monitor for breakthrough seizures as her doses are adjusted  Hyperammonemia:   - Will continue to follow  She will continue levocarnitine unchanged     - urine organic acids, plasma amino acids, acylcarnitine, and carnitine levels are pending    - continue lactulose      -------------------------------------------------------------------------------------------------------------------  Clari Novoa MD        Date: 8/20/2018     Time: 4:23 PM   [facility] [place]

## 2018-08-20 NOTE — ASSESSMENT & PLAN NOTE
· Unknown etiology, patient started on phenobarbital in April 2018  LFTs back in March were normal   According to epileptologist, liver enzymes were also normal in May  There was no further lab work to assess LFTs apart from lab work on admission  · Possibly 2/2 AEDs, particularly phenobarbital   Discussed with epileptologist    · Gastroenterology following, appreciate input, noted to have elevated INR that is now stabilized  Suspect hepatocellular pattern and appear to be improving  Will trend AST/ALT, INR  · Right upper quadrant ultrasound was unremarkable  · Full liver workup recommended by GI including viral hepatitis panel (negative), serology for autoimmune hepatitis, PBC, Ethan's disease, hemochromatosis and alpha 1 antitrypsin deficiency      · Follow up AM CMP

## 2018-08-20 NOTE — ASSESSMENT & PLAN NOTE
· BP soft at baseline   · Continue to monitor per unit routine  · Received IVF bolus and albumin yesterday

## 2018-08-20 NOTE — ASSESSMENT & PLAN NOTE
· Discussed in detail with Dr Kira Meza  While at [facility] her Manuel Colon was not on formulary, her phenobarb was reduced to 15 mg daily and her valproic acid was reduced to 250 mg TID (per discussion between hospitalist and patient's primary epileptologist ) Patient has history of breakthrough seizures with any minor change in her medications  Seizures are very subtle and she has history of 4 ictal patterns    · Continue home meds med current doses which include:   · non formulary rufinamide 1200mg BID  · perampanel dec to 8mg at bedtime   · topamax dec to 200mg BID  · Keppra 1000mg q12  · valproic acid 500mg q8  · phenobarb dec to 20mg at bedtime   · Low threshold to initiate EMU at this time   · Follow-up AED levels today   · Seizure precautions, prn ativan

## 2018-08-20 NOTE — PROGRESS NOTES
Progress Note - Ewelina Beverly 41 y o  female MRN: [id number]    [facility] Encounter: [id number]    Subjective:     Patient seen and examined at bedside  She is non-verbal  No acute events reported overnight  Objective:     Vitals: Blood pressure (!) 78/57, pulse (!) 53, temperature 97 5 °F (36 4 °C), temperature source Axillary, resp  rate 16, height 4' 9" (1 448 m), weight 41 5 kg (91 lb 7 9 oz), SpO2 96 %  ,Body mass index is 19 8 kg/m²  Intake/Output Summary (Last 24 hours) at 08/20/18 1245  Last data filed at 08/20/18 0900   Gross per 24 hour   Intake              900 ml   Output                0 ml   Net              900 ml       Physical Exam:     General Appearance: Non-verbal, lethargic, opens eyes to voice but does not follow commands, no acute distress  Lungs: Clear to auscultation bilaterally, no rales or rhonchi, no labored breathing/accessory muscle use  Heart: Regular rate and rhythm, S1, S2 normal, no murmur, click, rub or gallop  Abdomen: PEG tube in LUQ  Non-distended  Normal bowel sounds  Soft  Non-tender to palpation  Extremities: No cyanosis, edema    Invasive Devices     Peripheral Intravenous Line            Peripheral IV 08/16/18 Right Wrist 3 days          Drain            Gastrostomy/Enterostomy Gastrostomy 18 Fr    days          Nasogastric/Orogastric Tube            Feeding Tube 565 days                Lab Results:    Results from last 7 days  Lab Units 08/18/18  0507   WBC Thousand/uL 9 79   HEMOGLOBIN g/dL 12 8   HEMATOCRIT % 39 0   PLATELETS Thousands/uL 260  255   NEUTROS PCT % 49   LYMPHS PCT % 34   MONOS PCT % 11   EOS PCT % 5       Results from last 7 days  Lab Units 08/20/18  0446   SODIUM mmol/L 141   POTASSIUM mmol/L 3 8   CHLORIDE mmol/L 109*   CO2 mmol/L 24   BUN mg/dL 14   CREATININE mg/dL 0 50*   CALCIUM mg/dL 8 8   TOTAL PROTEIN g/dL 6 5  6 5   BILIRUBIN TOTAL mg/dL 0 73  0 57   ALK PHOS U/L 114  114   ALT U/L 171*  167*   AST U/L 166*  160* GLUCOSE RANDOM mg/dL 81       Results from last 7 days  Lab Units 08/19/18  0558   INR  1 12           Imaging Studies: I have personally reviewed pertinent imaging studies  No results found  Assessment and Plan:    1) Elevated LFTs: Likely drug-induced liver injury from multiple anti-epileptic drugs, particularly phenobarbital  Liver enzymes have been trending down over the past few days since adjusting dose of phenobarbital  Today , , alkaline phosphatase 114, and total bilirubin 0 73  INR has normalized  Thus far chronic hepatitis panel negative and RAFITA normal  Right upper quadrant ultrasound unremarkable      -Monitor LFTs  -Follow-up serologies  -Neurology considering primidone as a replacement for phenobarbital    2) Hyperammonemia: Likely due to a combination of valproic acid and topiramate however cannot rule out intrinsic liver disease and inborn errors of metabolism    -Continue lactulose and levocarnitine  -Follow LFTs and serologies

## 2018-08-20 NOTE — PLAN OF CARE
Problem: DISCHARGE PLANNING - CARE MANAGEMENT  Goal: Discharge to post-acute care or home with appropriate resources  INTERVENTIONS:  - Conduct assessment to determine patient/family and health care team treatment goals, and need for post-acute services based on payer coverage, community resources, and patient preferences, and barriers to discharge  - Address psychosocial, clinical, and financial barriers to discharge as identified in assessment in conjunction with the patient/family and health care team  - Arrange appropriate level of post-acute services according to patient's   needs and preference and payer coverage in collaboration with the physician and health care team  - Communicate with and update the patient/family, physician, and health care team regarding progress on the discharge plan  - Arrange appropriate transportation to post-acute venues  - Plan for discharge to [facility]    Outcome: Progressing

## 2018-08-20 NOTE — OCCUPATIONAL THERAPY NOTE
Occupational Therapy Screen        Patient Name: Tamiko Quiroz  GBSNE'Y Date: 8/20/2018        Pt screened for OT services  Spoke with PT, Antonia Powers who reports from  that pt  Is a bed hold at facility  Plan is to return for facility with A/S as needed  At Baseline, she is D for ADLs with continual 1:1 observation  As such, pt  Does not require acute care OT and to [facility] this [place]  Happy to re-consult if needed        CHRIS Galeana, OTR/L

## 2018-08-20 NOTE — PHYSICAL THERAPY NOTE
Pt screened for PT services  Pt is a long term resident of [facility]  Is dependant A for all ADLs, A x 2 for all transfers  No skilled acute care PT needs identified  Will sign off, and may return to snf when stable    Sara Jaramillo PT, DPT CSRS

## 2018-08-20 NOTE — PROGRESS NOTES
Progress Note - Amando Hannah 1980, 41 y o  female MRN: [id number]  [facility] Encounter: [id number] DOS: 8/20/18  Primary Care Provider: Nik Braga DO   Date and time admitted to hospital: 8/17/2018  9:28 PM    * Acute encephalopathy   Assessment & Plan    · Patient presented with AMS and somnolence from nursing facility  She was admitted to [facility] 8/13 to 8/17  There was question of underlying pneumonia although PCT level was low and antibiotics were d/c  She was noted to have hyperammonemia although this is chronic  Found to have elevated LFTs and Depakote, phenobarbital doses were decreased  Fycompa was not on formulary and suspect patient was having subtle breakthrough seizures likely contributing to her somnolence  · Patient antiepileptics resumed at normal doses and phenobarb was decreased from 60 mg to 20 mg per Neurology  · Mental status at baseline waxes and wanes with intermittent periods of hyper and hypoactivity  · Continue to monitor mental status - fluctuates throughout the day   · Continue one-to-one continuous observation        Transaminitis   Assessment & Plan    · Unknown etiology, patient started on phenobarbital in April 2018  LFTs back in March were normal   According to epileptologist, liver enzymes were also normal in May  There was no further lab work to assess LFTs apart from lab work on admission  · Possibly 2/2 AEDs, particularly phenobarbital   Discussed with epileptologist    · Gastroenterology following, appreciate input, noted to have elevated INR that is now stabilized  Suspect hepatocellular pattern and appear to be improving  Will trend AST/ALT, INR  · Right upper quadrant ultrasound was unremarkable  · Full liver workup recommended by GI including viral hepatitis panel (negative), serology for autoimmune hepatitis, PBC, Ethan's disease, hemochromatosis and alpha 1 antitrypsin deficiency      · Follow up AM CMP         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with tonic seizures (HCC)   Assessment & Plan    · Discussed in detail with Dr Iris Jc  While at [address] [place] [address] her Jacquiline Jurist was not on formulary, her phenobarb was reduced to 15 mg daily and her valproic acid was reduced to 250 mg TID (per discussion between hospitalist and patient's primary epileptologist ) Patient has history of breakthrough seizures with any minor change in her medications  Seizures are very subtle and she has history of 4 ictal patterns  · Continue home meds med current doses which include:   · non formulary rufinamide 1200mg BID  · perampanel dec to 8mg at bedtime   · topamax dec to 200mg BID  · Keppra 1000mg q12  · valproic acid 500mg q8  · phenobarb dec to 20mg at bedtime   · Low threshold to initiate EMU at this time   · Follow-up AED levels today   · Seizure precautions, prn ativan         Hypotension   Assessment & Plan    · BP soft at baseline   · Continue to monitor per unit routine  · Received IVF bolus and albumin yesterday         Hyperammonemia (HCC)   Assessment & Plan    · Ammonia tends to fluctuate in this patient, Likely secondary to valproic acid  Slight elevated today  · Can continue lactulose, levocarnitine, Xifaxan although unclear if there is any benefit of continuing Xifaxin   · Inc levocarnitine to 750 TID per epileptology   · Appreciate GI input   · Per epileptologist, pending urine organic acid, plasma amino acid, plasma acylcarnitine levels, carnitine level         Intellectual disability   Assessment & Plan    · Supportive care        Dysphagia   Assessment & Plan    · Patient had 1 episode of vomiting 8/16   · Tolerating tube feeds          Addend: d/w Dr Niya Chaudhari epileptologist   Feel that patients encephalopathy is likely related to antiepileptic medications  Her Depakote will be decreased to 250 with breakfast 250 with lunch and 500 at bedtime  Will likely need another couple more days of inpatient monitoring      VTE Pharmacologic Prophylaxis:   Pharmacologic: Enoxaparin (Lovenox)  Mechanical VTE Prophylaxis in Place: Yes    Patient Centered Rounds: I have performed bedside rounds with nursing staff today  Discussions with Specialists or Other Care Team Provider: nursing    Education and Discussions with Family / Patient: patient, called father for update     Time Spent for Care: 30 minutes  More than 50% of total time spent on counseling and coordination of care as described above  Current Length of Stay: 3 day(s)    Current Patient Status: Inpatient   Certification Statement: The patient will continue to require additional inpatient hospital stay due to patient will need continued monitoring of LFTs, AED levels and mental status     Discharge Plan / Estimated Discharge Date: pending, likely in next 24-48 hrs pending above  [facility]  Code Status: Level 1 - Full Code    Subjective:   Pt seen and examined at bedside  Does not participate  Now laying on her left side  Smiled and took deep breaths  Objective:     Vitals:   Temp (24hrs), Av 7 °F (36 5 °C), Min:96 8 °F (36 °C), Max:98 9 °F (37 2 °C)    HR:  [51-95] 53  Resp:  [14-16] 16  BP: (74-83)/(40-64) 78/57  SpO2:  [94 %-99 %] 96 %  Body mass index is 19 8 kg/m²  Input and Output Summary (last 24 hours): Intake/Output Summary (Last 24 hours) at 18 0858  Last data filed at 18 0601   Gross per 24 hour   Intake              700 ml   Output                0 ml   Net              700 ml     Physical Exam:     Physical Exam   Constitutional: She appears well-developed  No distress  Appears younger than stated age  Now laying on left side  HENT:   Head: Normocephalic and atraumatic  Eyes: EOM are normal  No scleral icterus  Neck: Normal range of motion  Neck supple  Cardiovascular: Normal rate, regular rhythm and normal heart sounds  No murmur heard  Pulmonary/Chest: Effort normal and breath sounds normal    Abdominal: Soft   Bowel sounds are normal    PEG in place   Neurological: She is alert  Skin: Skin is warm and dry  There is erythema (papular rash on cheeks and face )  Psychiatric: She has a normal mood and affect  Nonverbal      Additional Data:     Labs:      Results from last 7 days  Lab Units 08/18/18  0507   WBC Thousand/uL 9 79   HEMOGLOBIN g/dL 12 8   HEMATOCRIT % 39 0   PLATELETS Thousands/uL 260  255   NEUTROS PCT % 49   LYMPHS PCT % 34   MONOS PCT % 11   EOS PCT % 5       Results from last 7 days  Lab Units 08/20/18  0446 08/19/18  0558   SODIUM mmol/L  --  138   POTASSIUM mmol/L  --  3 7   CHLORIDE mmol/L  --  106   CO2 mmol/L  --  25   BUN mg/dL  --  12   CREATININE mg/dL  --  0 40*   CALCIUM mg/dL  --  8 7   TOTAL PROTEIN g/dL 6 5 6 4   BILIRUBIN TOTAL mg/dL 0 57 0 55   ALK PHOS U/L 114 126*   ALT U/L 167* 169*   AST U/L 160* 120*   GLUCOSE RANDOM mg/dL  --  87       Results from last 7 days  Lab Units 08/19/18  0558   INR  1 12       * I Have Reviewed All Lab Data Listed Above  * Additional Pertinent Lab Tests Reviewed:  Maddie [facility] Admission Reviewed    Imaging:    Imaging Reports Reviewed Today Include: all  Imaging Personally Reviewed by Myself Includes:  none    Recent Cultures (last 7 days):       Results from last 7 days  Lab Units 08/13/18  1736   LEGIONELLA URINARY ANTIGEN  Negative       Last 24 Hours Medication List:     Current Facility-Administered Medications:  acetaminophen 650 mg Per G Tube Q6H PRN Vianney Tarango MD   artificial tear 1 application Ophthalmic TID Vianney Tarango MD   bisacodyl 10 mg Rectal Daily PRN Vianney Tarango MD   enoxaparin 40 mg Subcutaneous Daily Vianney Tarango MD   lactulose 30 g Per G Tube TID Vianney Tarango MD   levETIRAcetam 1,000 mg Per G Tube Q12H [address] Vianney Tarango MD   levOCARNitine 750 mg Per G Tube TID Denise Hazel MD   magnesium hydroxide 30 mL Oral Daily PRN Vianney Tarango MD   ondansetron 4 mg Intravenous Q6H PRN Vianney Tarango MD ondansetron 4 mg Oral Q6H PRN Dia Gallardo MD   Perampanel 8 mg Per G Tube HS Wilbert Alas MD   PHENobarbital 20 mg Oral HS Wilbert Alas MD   potassium chloride 20 mEq Per G Tube Daily Dia Gallardo MD   rufinamide 1,200 mg Per G Tube BID Dia Gallardo MD   saccharomyces boulardii 250 mg Per NG Tube BID Dia Gallardo MD   senna-docusate sodium 1 tablet Per G Tube Daily Dia Gallardo MD   sodium phosphate-biphosphate 1 enema Rectal Daily PRN Dia Gallardo MD   topiramate 200 mg Oral Q12H [address] Wilbert Alas MD   valproic acid 500 mg Per [place] MD Crista        Today, Patient Was Seen By: Dixon Fletcher PA-C    ** Please Note: This note has been constructed using a voice recognition system   **

## 2018-08-20 NOTE — ASSESSMENT & PLAN NOTE
· Patient presented with AMS and somnolence from nursing facility  She was admitted to [facility] 8/13 to 8/17  There was question of underlying pneumonia although PCT level was low and antibiotics were d/c  She was noted to have hyperammonemia although this is chronic  Found to have elevated LFTs and Depakote, phenobarbital doses were decreased  Fycompa was not on formulary and suspect patient was having subtle breakthrough seizures likely contributing to her somnolence  · Patient antiepileptics resumed at normal doses and phenobarb was decreased from 60 mg to 20 mg per Neurology     · Mental status at baseline waxes and wanes with intermittent periods of hyper and hypoactivity  · Continue to monitor mental status - fluctuates throughout the day   · Continue one-to-one continuous observation

## 2018-08-20 NOTE — ASSESSMENT & PLAN NOTE
· Ammonia tends to fluctuate in this patient, Likely secondary to valproic acid  Slight elevated today     · Can continue lactulose, levocarnitine, Xifaxan although unclear if there is any benefit of continuing Xifaxin   · Inc levocarnitine to 750 TID per epileptology   · Appreciate GI input   · Per epileptologist, pending urine organic acid, plasma amino acid, plasma acylcarnitine levels, carnitine level

## 2018-08-20 NOTE — SOCIAL WORK
Pt transferred from [facility]  CM was informed that pt was admitted from [facility]  CM called [place] and spoke to Yaw in admissions who states pt is a 15 day MA bedhold  CM spoke to nurse supervisor Karlie Trujillo at [facility] to discuss pt's prior level of functioning  Pt requires a 1:1 supervision for safety  Karlie Trujillo states pt is given finger foods and able to feed herself  Pt requires total care/assist x2 for all other ADL's  Pt requires assist x1 for transfers from bed to foam chair  Pt primarily lays in a foam chair during the day  Karlie Trujillo states pt is able to ambulate with assist x2 when she wants to--Arianne states she does not ambulate often  Message left for pt's mother Severa Crow to discuss dc plan

## 2018-08-21 LAB
A1AT SERPL-MCNC: 164 MG/DL (ref 90–200)
ACTIN IGG SERPL-ACNC: 7 UNITS (ref 0–19)
ALBUMIN SERPL BCP-MCNC: 2.4 G/DL (ref 3.5–5)
ALP SERPL-CCNC: 118 U/L (ref 46–116)
ALT SERPL W P-5'-P-CCNC: 196 U/L (ref 12–78)
ANION GAP SERPL CALCULATED.3IONS-SCNC: 10 MMOL/L (ref 4–13)
AST SERPL W P-5'-P-CCNC: 199 U/L (ref 5–45)
BILIRUB SERPL-MCNC: 0.45 MG/DL (ref 0.2–1)
BUN SERPL-MCNC: 18 MG/DL (ref 5–25)
CALCIUM SERPL-MCNC: 8.7 MG/DL (ref 8.3–10.1)
CERULOPLASMIN SERPL-MCNC: 19.3 MG/DL (ref 19–39)
CHLORIDE SERPL-SCNC: 108 MMOL/L (ref 100–108)
CO2 SERPL-SCNC: 21 MMOL/L (ref 21–32)
CREAT SERPL-MCNC: 0.52 MG/DL (ref 0.6–1.3)
GFR SERPL CREATININE-BSD FRML MDRD: 122 ML/MIN/1.73SQ M
GLUCOSE SERPL-MCNC: 111 MG/DL (ref 65–140)
MITOCHONDRIA M2 IGG SER-ACNC: 6.5 UNITS (ref 0–20)
POTASSIUM SERPL-SCNC: 4.2 MMOL/L (ref 3.5–5.3)
PROT SERPL-MCNC: 6.8 G/DL (ref 6.4–8.2)
SODIUM SERPL-SCNC: 139 MMOL/L (ref 136–145)
TOPIRAMATE SERPL-MCNC: 8.1 UG/ML (ref 2–25)

## 2018-08-21 PROCEDURE — 99231 SBSQ HOSP IP/OBS SF/LOW 25: CPT | Performed by: PSYCHIATRY & NEUROLOGY

## 2018-08-21 PROCEDURE — 80053 COMPREHEN METABOLIC PANEL: CPT | Performed by: PHYSICIAN ASSISTANT

## 2018-08-21 PROCEDURE — 99233 SBSQ HOSP IP/OBS HIGH 50: CPT | Performed by: INTERNAL MEDICINE

## 2018-08-21 RX ADMIN — POTASSIUM CHLORIDE 20 MEQ: 20 SOLUTION ORAL at 09:08

## 2018-08-21 RX ADMIN — Medication 250 MG: at 09:09

## 2018-08-21 RX ADMIN — PHENOBARBITAL 20 MG: 20 LIQUID ORAL at 21:15

## 2018-08-21 RX ADMIN — TOPIRAMATE 200 MG: 100 TABLET, FILM COATED ORAL at 09:09

## 2018-08-21 RX ADMIN — MINERAL OIL AND WHITE PETROLATUM 1 APPLICATION: 30; 940 OINTMENT OPHTHALMIC at 17:21

## 2018-08-21 RX ADMIN — ENOXAPARIN SODIUM 40 MG: 40 INJECTION SUBCUTANEOUS at 09:08

## 2018-08-21 RX ADMIN — LEVOCARNITINE 750 MG: 1 SOLUTION ORAL at 09:10

## 2018-08-21 RX ADMIN — RUFINAMIDE 1200 MG: 200 TABLET, FILM COATED ORAL at 09:10

## 2018-08-21 RX ADMIN — TOPIRAMATE 200 MG: 100 TABLET, FILM COATED ORAL at 21:11

## 2018-08-21 RX ADMIN — LACTULOSE 30 G: 20 SOLUTION ORAL at 09:08

## 2018-08-21 RX ADMIN — PERAMPANEL 8 MG: 4 TABLET ORAL at 21:11

## 2018-08-21 RX ADMIN — MINERAL OIL AND WHITE PETROLATUM 1 APPLICATION: 30; 940 OINTMENT OPHTHALMIC at 21:16

## 2018-08-21 RX ADMIN — LEVETIRACETAM 1000 MG: 100 SOLUTION ORAL at 09:09

## 2018-08-21 RX ADMIN — MINERAL OIL AND WHITE PETROLATUM 1 APPLICATION: 30; 940 OINTMENT OPHTHALMIC at 09:10

## 2018-08-21 RX ADMIN — Medication 250 MG: at 17:21

## 2018-08-21 RX ADMIN — LACTULOSE 30 G: 20 SOLUTION ORAL at 21:11

## 2018-08-21 RX ADMIN — VALPROIC ACID 500 MG: 500 SOLUTION ORAL at 21:11

## 2018-08-21 RX ADMIN — VALPROIC ACID 250 MG: 500 SOLUTION ORAL at 05:54

## 2018-08-21 RX ADMIN — VALPROIC ACID 250 MG: 500 SOLUTION ORAL at 13:47

## 2018-08-21 RX ADMIN — LACTULOSE 30 G: 20 SOLUTION ORAL at 17:20

## 2018-08-21 RX ADMIN — LEVETIRACETAM 1000 MG: 100 SOLUTION ORAL at 21:17

## 2018-08-21 RX ADMIN — SENNOSIDES AND DOCUSATE SODIUM 1 TABLET: 8.6; 5 TABLET ORAL at 09:09

## 2018-08-21 RX ADMIN — RUFINAMIDE 1200 MG: 200 TABLET, FILM COATED ORAL at 17:20

## 2018-08-21 RX ADMIN — LEVOCARNITINE 750 MG: 1 SOLUTION ORAL at 21:17

## 2018-08-21 RX ADMIN — LEVOCARNITINE 750 MG: 1 SOLUTION ORAL at 17:20

## 2018-08-21 NOTE — PROGRESS NOTES
Epilepsy Consult Daily Progress Note   Zuri Traore [id number] 46 y o  female   : 1980  MRN: [id number]     Unit/Bed#: [facility] 703-01    Encounter: [id number]  -------------------------------------------------------------------------------------------------------------------  S: Interval history:  No significant events were reported  As with yesterday, she is very sleepy  She has not awoken and been significantly interactive  Her free Valproate level from  was elevated at 35  There have not been any witnessed seizures reported  -------------------------------------------------------------------------------------------------------------------    ROS:  A 12 system review of system was not able to be obtained due to intellectual disability      All other review of systems negative   -------------------------------------------------------------------------------------------------------------------  Allergies:    Allergies   Allergen Reactions    Felbamate       Scheduled Meds:     Current Facility-Administered Medications:  acetaminophen 650 mg Per G Tube Q6H PRN Aby Mcfadden MD   artificial tear 1 application Ophthalmic TID Aby Mcfadden MD   bisacodyl 10 mg Rectal Daily PRN Aby Mcfadden MD   enoxaparin 40 mg Subcutaneous Daily Aby Mcfadden MD   lactulose 30 g Per G Tube TID Aby Mcfadden MD   levETIRAcetam 1,000 mg Per G Tube Q12H [address] Aby Mcfadden MD   levOCARNitine 750 mg Per G Tube TID Concepción Goyal MD   magnesium hydroxide 30 mL Oral Daily PRN Aby Mcfadden MD   ondansetron 4 mg Intravenous Q6H PRN Aby Mcfadden MD   ondansetron 4 mg Oral Q6H PRN Aby Mcfadden MD   Perampanel 8 mg Per G Tube HS Concepción Goyal MD   PHENobarbital 20 mg Oral HS Concepción Goyal MD   potassium chloride 20 mEq Per G Tube Daily Aby Mcfadden MD   rufinamide 1,200 mg Per G Tube BID Aby Mcfadden MD   saccharomyces boulardii 250 mg Per NG Tube BID Aby Mcfadden MD senna-docusate sodium 1 tablet Per G Tube Daily Kary Ponce MD   sodium phosphate-biphosphate 1 enema Rectal Daily PRN Kary Ponce MD   topiramate 200 mg Oral Q12H [facility] Brent Rogers MD   valproic acid 250 mg Per G Tube 2 times per day Richardean Bamberger, MD   valproic acid 500 mg Oral Daily Richardean Bamberger, MD     PRN Meds:   acetaminophen    bisacodyl    magnesium hydroxide    ondansetron    ondansetron    sodium phosphate-biphosphate  -------------------------------------------------------------------------------------------------------------------  Physical Exam:  Vitals: Blood pressure (!) 84/62, pulse 76, temperature 97 7 °F (36 5 °C), temperature source Axillary, resp  rate 16, height 4' 9" (1 448 m), weight 41 5 kg (91 lb 7 9 oz), SpO2 97 %  Neurologic Exam:  Asleep, but arousable to mild painful stimulation with nailbed pressure to hand  She again very quickly fell back asleep  Cranial nerves:   Pupils equally round and reactive  Motor:   Moves all extremities to stimulation  Decreased tone throughout  Sensation:  Responds to tactile stimulation in all extremities     -------------------------------------------------------------------------------------------------------------------  LAB & TEST RESULTS:    Recent Labs      08/19/18   0558  08/20/18   0446  08/21/18   0541   NA  138  141  139   K  3 7  3 8  4 2   CL  106  109*  108   CO2  25  24  21   BUN  12  14  18   CREATININE  0 40*  0 50*  0 52*   CALCIUM  8 7  8 8  8 7     Recent Labs      08/19/18   0558  08/20/18   0446  08/21/18   0541   ALB  2 3*  2 6*  2 7*  2 4*   ALKPHOS  126*  114  114  118*   ALT  169*  171*  167*  196*   AST  120*  166*  160*  199*     Recent Labs      08/19/18   0558   INR  1 12     Phenobarbital Lvl   Date Value Ref Range Status   08/20/2018 12 0 (L) 15 0 - 40 0 ug/mL Final   08/16/2018 16 0 15 0 - 40 0 ug/mL Final   08/13/2018 21 0 15 0 - 40 0 ug/mL Final     Valproic Acid, Total   Date Value Ref Range Status   08/20/2018 73 50 - 100 ug/mL Final   08/16/2018 56 50 - 100 ug/mL Final   08/13/2018 50 1 50 - 100 ug/mL Final     -------------------------------------------------------------------------------------------------------------------  Assessment/Plan:  Pam Rey is a 41 y o  female with intractable symptomatic generalized epilepsy, Lennox-Gastaut syndrome, and 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 (variable duration of wakefulness and sleep) presenting to hospital with increased sedation, somnolence, and hypercapnia  She continues to be quite sleepy  Her free Valproate level from 8/16 was quite elevated  With her low albumin and competition for protein binding with phenobarbital likely has lead to the elevation in her free VPA levels  Unfortunately, this free level takes quite a long time to return  Her Valproate dose was decreased to her current amount this morning, so I will recheck drug levels and ammonia level tomorrow to see how she is doing on the lower dose  Recommendations:   Epilepsy:    - continue Topiramate 200 mg twice a day, phenobarbital 20 mg nightly, perampanel 8 mg nightly, rufinamide 1200 mg twice a day, and Levetiracetam 96682 mg twice a day  - will recheck levels on her current Valproate dose of 250 mg in the am, 250 mg in the afternoon, and 500 mg nightly  Will try to get her free Valproate level back into the normal range  - Will recheck drug levels later this week and await recent free valproate level  - Will need to monitor for breakthrough seizures as her doses are adjusted  Hyperammonemia:   - Will continue to follow  She will continue levocarnitine unchanged     - urine organic acids, plasma amino acids, acylcarnitine, and carnitine levels are pending    - continue lactulose      -------------------------------------------------------------------------------------------------------------------  Manny Thompson MD        Date: 8/21/2018     Time: 3:11 PM   [facility]

## 2018-08-21 NOTE — ASSESSMENT & PLAN NOTE
· Unknown etiology, patient started on phenobarbital in April 2018  LFTs back in March were normal   According to epileptologist, liver enzymes were also normal in May  There was no further lab work to assess LFTs apart from lab work on admission  · Possibly 2/2 AEDs, particularly phenobarbital   Workup labs still pending  US wnl  CMP has trended down/stabalized

## 2018-08-21 NOTE — PROGRESS NOTES
Progress Note - Alberto Squires 1980, 41 y o  female MRN: [id number]    [facility] Encounter: [id number]    Primary Care Provider: Diane Zavala DO   Date and time admitted to hospital: 8/17/2018  9:28 PM        Hypotension   Assessment & Plan    · BP runs low at baseline   · Monitor, IVF as needed, consider midodrine         Transaminitis   Assessment & Plan    · Unknown etiology, patient started on phenobarbital in April 2018  LFTs back in March were normal   According to epileptologist, liver enzymes were also normal in May  There was no further lab work to assess LFTs apart from lab work on admission  · Possibly 2/2 AEDs, particularly phenobarbital   Workup labs still pending  US wnl  CMP has trended down/stabalized  Hyperammonemia (HCC)   Assessment & Plan    · Ammonia tends to fluctuate in this patient, Likely secondary to valproic acid  · Can continue lactulose, levocarnitine, Xifaxan although unclear if there is any benefit of continuing Xifaxin   · Appreciate GI input   · Per epileptologist, pending urine organic acid, plasma amino acid, plasma acylcarnitine levels, carnitine level         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with tonic seizures (HCC)   Assessment & Plan    · Discussed in detail with Dr Alireza Coppola  While at [facility] her Parish Carter was not on formulary, her phenobarb was reduced to 15 mg daily and her valproic acid was reduced to 250 mg TID (per discussion between hospitalist and patient's primary epileptologist ) Patient has history of breakthrough seizures with any minor change in her medications  Seizures are very subtle and she has history of 4 ictal patterns  · Management per Neurology         Dysphagia   Assessment & Plan    Tolerating tube feeds        * Acute encephalopathy   Assessment & Plan    · Patient presented with AMS and somnolence from nursing facility  She was admitted to [facility] 8/13 to 8/17    There was question of underlying pneumonia although PCT level was low and antibiotics were d/c  She was noted to have hyperammonemia although this is chronic  Found to have elevated LFTs and Depakote, phenobarbital doses were decreased  Fycompa was not on formulary and suspect patient was having subtle breakthrough seizures likely contributing to her somnolence  · Mental status at baseline waxes and wanes with intermittent periods of hyper and hypoactivity  · Continue to monitor mental status - fluctuates throughout the day   · Continue one-to-one continuous observation  · Medication regimen per Neurology             Subjective:   Sleeping, not arousing     Objective:     Vitals:   Temp (24hrs), Av 6 °F (36 4 °C), Min:97 5 °F (36 4 °C), Max:97 7 °F (36 5 °C)    HR:  [70-80] 76  Resp:  [16] 16  BP: (77-99)/(56-62) 84/62  SpO2:  [97 %-98 %] 97 %  Body mass index is 19 8 kg/m²  Input and Output Summary (last 24 hours):        Intake/Output Summary (Last 24 hours) at 18 1005  Last data filed at 18 0901   Gross per 24 hour   Intake             1300 ml   Output              330 ml   Net              970 ml       Physical Exam:     Physical Exam    GEN: NAD  HEENT: PERRL  CARDIO: s1 s2 RRR  LUNGS: CTA  ABD: Soft, NT/ND  EXT: No edema   Neuro: sleeping, not easily aroused       Additional Data:     Labs:      Results from last 7 days  Lab Units 18  0507   WBC Thousand/uL 9 79   HEMOGLOBIN g/dL 12 8   HEMATOCRIT % 39 0   PLATELETS Thousands/uL 260  255   NEUTROS PCT % 49   LYMPHS PCT % 34   MONOS PCT % 11   EOS PCT % 5       Results from last 7 days  Lab Units 18  0541   SODIUM mmol/L 139   POTASSIUM mmol/L 4 2   CHLORIDE mmol/L 108   CO2 mmol/L 21   BUN mg/dL 18   CREATININE mg/dL 0 52*   CALCIUM mg/dL 8 7   TOTAL PROTEIN g/dL 6 8   BILIRUBIN TOTAL mg/dL 0 45   ALK PHOS U/L 118*   ALT U/L 196*   AST U/L 199*   GLUCOSE RANDOM mg/dL 111       Results from last 7 days  Lab Units 18  0558   INR  1 12       * I Have Reviewed All Lab Data Listed Above   * Additional Pertinent Lab Tests Reviewed: Maddie [facility] Admission Reviewed    Recent Cultures (last 7 days):           Last 24 Hours Medication List:     Current Facility-Administered Medications:  acetaminophen 650 mg Per G Tube Q6H PRN Michael Martin MD   artificial tear 1 application Ophthalmic TID Michael Martin MD   bisacodyl 10 mg Rectal Daily PRN Michael Martin MD   enoxaparin 40 mg Subcutaneous Daily Michael Martin MD   lactulose 30 g Per G Tube TID Michael Martin MD   levETIRAcetam 1,000 mg Per G Tube Q12H [facility] Michael Martin MD   levOCARNitine 750 mg Per G Tube TID Nancy Mujica MD   magnesium hydroxide 30 mL Oral Daily PRN Michael Martin MD   ondansetron 4 mg Intravenous Q6H PRN Michael Martin MD   ondansetron 4 mg Oral Q6H PRN Michael Martin MD   Perampanel 8 mg Per G Tube HS Nancy Mujica MD   PHENobarbital 20 mg Oral HS Nancy Mujica MD   potassium chloride 20 mEq Per G Tube Daily Michael Martin MD   rufinamide 1,200 mg Per G Tube BID Michael Martin MD   saccharomyces boulardii 250 mg Per NG Tube BID Michael Martin MD   senna-docusate sodium 1 tablet Per G Tube Daily Michael Martin MD   sodium phosphate-biphosphate 1 enema Rectal Daily PRN Michael Martin MD   topiramate 200 mg Oral Q12H [facility] Nancy Mujica MD   valproic acid 250 mg Per G Tube 2 times per day Davonte Castillo MD   valproic acid 500 mg Oral Daily Davonte Castillo MD        Today, Patient Was Seen By: Vinicius Gee MD    ** Please Note: This note has been constructed using a voice recognition system   **

## 2018-08-21 NOTE — CASE MANAGEMENT
Continued Stay Review    Date:  8-21-18       Vital Signs: BP 94/62 (BP Location: Right arm)   Pulse 69   Temp 98 7 °F (37 1 °C) (Axillary)   Resp 16   Ht 4' 9" (1 448 m)   Wt 41 5 kg (91 lb 7 9 oz)   LMP  (LMP Unknown)   SpO2 98%   BMI 19 80 kg/m²     Medications:   Scheduled Meds:   Current Facility-Administered Medications:  acetaminophen 650 mg Per G Tube Q6H PRN   artificial tear 1 application Ophthalmic TID   bisacodyl 10 mg Rectal Daily PRN   enoxaparin 40 mg Subcutaneous Daily   lactulose 30 g Per G Tube TID   levETIRAcetam 1,000 mg Per G Tube Q12H [facility]   levOCARNitine 750 mg Per G Tube TID   magnesium hydroxide 30 mL Oral Daily PRN   ondansetron 4 mg Intravenous Q6H PRN   ondansetron 4 mg Oral Q6H PRN   Perampanel 8 mg Per G Tube HS   PHENobarbital 20 mg Oral HS   potassium chloride 20 mEq Per G Tube Daily   rufinamide 1,200 mg Per G Tube BID   saccharomyces boulardii 250 mg Per NG Tube BID   senna-docusate sodium 1 tablet Per G Tube Daily   sodium phosphate-biphosphate 1 enema Rectal Daily PRN   topiramate 200 mg Oral Q12H [facility]   valproic acid 250 mg Per G Tube 2 times per day   valproic acid 500 mg Oral Daily       Age/Sex: 41 y o  female     Assessment/Plan:     Harish Suarez is a 41 y o  female with intractable symptomatic generalized epilepsy, Lennox-Gastaut syndrome, and severe intellectual disability with abnormal circadian rhythm (variable duration of wakefulness and sleep) presenting to hospital with increased sedation, somnolence, and hypercapnia      She continues to be quite sleepy  Her free Valproate level from 8/16 was quite elevated  With her low albumin and competition for protein binding with phenobarbital likely has lead to the elevation in her free VPA levels  Unfortunately, this free level takes quite a long time to return   Her Valproate dose was decreased to her current amount this morning, so I will recheck drug levels and ammonia level tomorrow to see how she is doing on the lower dose       Recommendations:   Epilepsy:    - continue Topiramate 200 mg twice a day, phenobarbital 20 mg nightly, perampanel 8 mg nightly, rufinamide 1200 mg twice a day, and Levetiracetam 94042 mg twice a day  - will recheck levels on her current Valproate dose of 250 mg in the am, 250 mg in the afternoon, and 500 mg nightly  Will try to get her free Valproate level back into the normal range  - Will recheck drug levels later this week and await recent free valproate level  - Will need to monitor for breakthrough seizures as her doses are adjusted       Hyperammonemia:   - Will continue to follow  She will continue levocarnitine unchanged     - urine organic acids, plasma amino acids, acylcarnitine, and carnitine levels are pending    - continue lactulose           Discharge Plan:   TO BE DETERMINED

## 2018-08-21 NOTE — ASSESSMENT & PLAN NOTE
· Discussed in detail with Dr Terence Loco  While at [address] [place] [address] her Vera Espitia was not on formulary, her phenobarb was reduced to 15 mg daily and her valproic acid was reduced to 250 mg TID (per discussion between hospitalist and patient's primary epileptologist ) Patient has history of breakthrough seizures with any minor change in her medications  Seizures are very subtle and she has history of 4 ictal patterns    · Management per Neurology

## 2018-08-21 NOTE — ASSESSMENT & PLAN NOTE
· Ammonia tends to fluctuate in this patient, Likely secondary to valproic acid      · Can continue lactulose, levocarnitine, Xifaxan although unclear if there is any benefit of continuing Xifaxin   · Appreciate GI input   · Per epileptologist, pending urine organic acid, plasma amino acid, plasma acylcarnitine levels, carnitine level

## 2018-08-22 LAB
A-AMINOBUTYR SERPL-SCNC: 26.1 UMOL/L (ref 5.4–34.5)
AAA SERPL-SCNC: 0.5 UMOL/L (ref 0–1.9)
ALANINE SERPL-SCNC: 305.4 UMOL/L (ref 209.2–515.5)
ALBUMIN SERPL BCP-MCNC: 2.6 G/DL (ref 3.5–5)
ALLOISOLEUCINE SERPL-SCNC: 0.6 UMOL/L (ref 0–3.2)
ALP SERPL-CCNC: 116 U/L (ref 46–116)
ALT SERPL W P-5'-P-CCNC: 156 U/L (ref 12–78)
AMINO ACID PAT SERPL-IMP: ABNORMAL
AMINO ACID, QN URINE: ABNORMAL
AMMONIA PLAS-SCNC: 98 UMOL/L (ref 11–35)
ANION GAP SERPL CALCULATED.3IONS-SCNC: 7 MMOL/L (ref 4–13)
ARGININE SERPL-SCNC: 43 UMOL/L (ref 36.3–119.2)
ARGININOSUCCINATE SERPL-SCNC: <0.1 UMOL/L (ref 0–3)
ASPARAGINE SERPL-SCNC: 36.6 UMOL/L (ref 29.5–84.5)
ASPARTATE SERPL-SCNC: 2.4 UMOL/L (ref 0–7.4)
AST SERPL W P-5'-P-CCNC: 127 U/L (ref 5–45)
B-AIB SERPL-SCNC: 1.4 UMOL/L (ref 0–4.3)
B-ALANINE SERPL-SCNC: 1 UMOL/L (ref 1.1–9)
BILIRUB SERPL-MCNC: 0.37 MG/DL (ref 0.2–1)
BUN SERPL-MCNC: 16 MG/DL (ref 5–25)
CALCIUM SERPL-MCNC: 8.4 MG/DL (ref 8.3–10.1)
CARN ESTERS/C0 SERPL-SRTO: 0.2 RATIO (ref 0–0.9)
CARNITINE FREE SERPL-SCNC: 84 UMOL/L (ref 20–55)
CARNITINE SERPL-SCNC: 104 UMOL/L (ref 27–73)
CHLORIDE SERPL-SCNC: 108 MMOL/L (ref 100–108)
CITRULLINE SERPL-SCNC: 33.8 UMOL/L (ref 15.6–46.9)
CO2 SERPL-SCNC: 26 MMOL/L (ref 21–32)
CREAT SERPL-MCNC: 0.54 MG/DL (ref 0.6–1.3)
CYSTATHIONIN SERPL-SCNC: <0.5 UMOL/L (ref 0–0.7)
CYSTINE SERPL-SCNC: 16.8 UMOL/L (ref 15.8–47.3)
GABA SERPL-SCNC: <0.5 UMOL/L (ref 0–0.6)
GFR SERPL CREATININE-BSD FRML MDRD: 121 ML/MIN/1.73SQ M
GLUCOSE SERPL-MCNC: 169 MG/DL (ref 65–140)
GLUTAMATE SERPL-SCNC: 87 UMOL/L (ref 18.1–155.9)
GLUTAMINE SERPL-SCNC: 332.7 UMOL/L (ref 372.8–701.4)
GLYCINE SERPL-SCNC: 197.7 UMOL/L (ref 144–411)
HCYS SERPL-SCNC: <0.3 UMOL/L (ref 0–0.2)
HISTIDINE SERPL-SCNC: 73.3 UMOL/L (ref 47.2–98.5)
HOMOCITRULLINE SERPL-SCNC: <0.5 UMOL/L (ref 0–1.7)
ISOLEUCINE SERPL-SCNC: 45.7 UMOL/L (ref 32.8–88.3)
LAB DIRECTOR NAME PROVIDER: ABNORMAL
LEUCINE SERPL-SCNC: 77.2 UMOL/L (ref 66.7–165.7)
LEVETIRACETAM SERPL-MCNC: 44 UG/ML (ref 10–40)
LYSINE SERPL-SCNC: 104 UMOL/L (ref 94–278)
Lab: NORMAL
METHIONINE SERPL-SCNC: 16.9 UMOL/L (ref 14.7–35.2)
OH-LYSINE SERPL-SCNC: 0.4 UMOL/L (ref 0.1–0.8)
OH-PROLINE SERPL-SCNC: 4.6 UMOL/L (ref 4.7–35.2)
ORGANIC ACIDS PATTERN UR-IMP: NORMAL
ORNITHINE SERPL-SCNC: 53 UMOL/L (ref 30.1–101.3)
PHE SERPL-SCNC: 40 UMOL/L (ref 35.8–76.9)
PHENOBARB SERPL-MCNC: 12.5 UG/ML (ref 15–40)
POTASSIUM SERPL-SCNC: 3.7 MMOL/L (ref 3.5–5.3)
PROLINE SERPL-SCNC: 177.1 UMOL/L (ref 84.8–352.5)
PROT SERPL-MCNC: 6.4 G/DL (ref 6.4–8.2)
REF LAB TEST METHOD: ABNORMAL
REF LAB TEST METHOD: NORMAL
SARCOSINE SERPL-SCNC: 2 UMOL/L (ref 0–4)
SERINE SERPL-SCNC: 145.5 UMOL/L (ref 48.7–145.2)
SL AMB DISCLAIMER: NORMAL
SODIUM SERPL-SCNC: 141 MMOL/L (ref 136–145)
STONE ANALYSIS-IMP: ABNORMAL
TAURINE SERPL-SCNC: 58.1 UMOL/L (ref 29.2–132.3)
THREONINE SERPL-SCNC: 137.3 UMOL/L (ref 67.8–211.6)
TRYPTOPHAN SERPL-SCNC: 18.4 UMOL/L (ref 23.5–93)
TYROSINE SERPL-SCNC: 48.9 UMOL/L (ref 27.8–83.3)
VALINE SERPL-SCNC: 143.8 UMOL/L (ref 133–317.1)
VALPROATE FREE SERPL-MCNC: 15.8 UG/ML (ref 6–22)
VALPROATE SERPL-MCNC: 55 UG/ML (ref 50–100)

## 2018-08-22 PROCEDURE — 82140 ASSAY OF AMMONIA: CPT | Performed by: INTERNAL MEDICINE

## 2018-08-22 PROCEDURE — 80165 DIPROPYLACETIC ACID FREE: CPT | Performed by: PSYCHIATRY & NEUROLOGY

## 2018-08-22 PROCEDURE — 80164 ASSAY DIPROPYLACETIC ACD TOT: CPT | Performed by: PSYCHIATRY & NEUROLOGY

## 2018-08-22 PROCEDURE — 99232 SBSQ HOSP IP/OBS MODERATE 35: CPT | Performed by: PHYSICIAN ASSISTANT

## 2018-08-22 PROCEDURE — 99232 SBSQ HOSP IP/OBS MODERATE 35: CPT | Performed by: PSYCHIATRY & NEUROLOGY

## 2018-08-22 PROCEDURE — 80184 ASSAY OF PHENOBARBITAL: CPT | Performed by: PSYCHIATRY & NEUROLOGY

## 2018-08-22 PROCEDURE — 80053 COMPREHEN METABOLIC PANEL: CPT | Performed by: INTERNAL MEDICINE

## 2018-08-22 RX ORDER — VALPROIC ACID 250 MG/5ML
250 SOLUTION ORAL 3 TIMES DAILY
Status: DISCONTINUED | OUTPATIENT
Start: 2018-08-22 | End: 2018-08-24 | Stop reason: HOSPADM

## 2018-08-22 RX ORDER — VALPROIC ACID 250 MG/5ML
250 SOLUTION ORAL 2 TIMES DAILY
Status: DISCONTINUED | OUTPATIENT
Start: 2018-08-22 | End: 2018-08-22

## 2018-08-22 RX ADMIN — MINERAL OIL AND WHITE PETROLATUM 1 APPLICATION: 30; 940 OINTMENT OPHTHALMIC at 16:55

## 2018-08-22 RX ADMIN — LACTULOSE 30 G: 20 SOLUTION ORAL at 09:29

## 2018-08-22 RX ADMIN — MINERAL OIL AND WHITE PETROLATUM 1 APPLICATION: 30; 940 OINTMENT OPHTHALMIC at 09:28

## 2018-08-22 RX ADMIN — LACTULOSE 30 G: 20 SOLUTION ORAL at 22:19

## 2018-08-22 RX ADMIN — VALPROIC ACID 250 MG: 500 SOLUTION ORAL at 22:20

## 2018-08-22 RX ADMIN — SENNOSIDES AND DOCUSATE SODIUM 1 TABLET: 8.6; 5 TABLET ORAL at 09:30

## 2018-08-22 RX ADMIN — PERAMPANEL 8 MG: 4 TABLET ORAL at 22:30

## 2018-08-22 RX ADMIN — LACTULOSE 30 G: 20 SOLUTION ORAL at 16:54

## 2018-08-22 RX ADMIN — LEVOCARNITINE 750 MG: 1 SOLUTION ORAL at 16:55

## 2018-08-22 RX ADMIN — VALPROIC ACID 250 MG: 500 SOLUTION ORAL at 05:29

## 2018-08-22 RX ADMIN — LEVETIRACETAM 1000 MG: 100 SOLUTION ORAL at 09:30

## 2018-08-22 RX ADMIN — Medication 250 MG: at 16:55

## 2018-08-22 RX ADMIN — POTASSIUM CHLORIDE 20 MEQ: 20 SOLUTION ORAL at 09:29

## 2018-08-22 RX ADMIN — RUFINAMIDE 1200 MG: 200 TABLET, FILM COATED ORAL at 16:54

## 2018-08-22 RX ADMIN — ENOXAPARIN SODIUM 40 MG: 40 INJECTION SUBCUTANEOUS at 09:28

## 2018-08-22 RX ADMIN — LEVOCARNITINE 750 MG: 1 SOLUTION ORAL at 09:30

## 2018-08-22 RX ADMIN — PHENOBARBITAL 20 MG: 20 LIQUID ORAL at 22:20

## 2018-08-22 RX ADMIN — RUFINAMIDE 1200 MG: 200 TABLET, FILM COATED ORAL at 09:30

## 2018-08-22 RX ADMIN — TOPIRAMATE 200 MG: 100 TABLET, FILM COATED ORAL at 09:29

## 2018-08-22 RX ADMIN — Medication 250 MG: at 09:30

## 2018-08-22 RX ADMIN — VALPROIC ACID 250 MG: 500 SOLUTION ORAL at 16:54

## 2018-08-22 RX ADMIN — TOPIRAMATE 200 MG: 100 TABLET, FILM COATED ORAL at 22:20

## 2018-08-22 NOTE — ASSESSMENT & PLAN NOTE
· While at [address] her Jennifer Marquez was not on formulary, her phenobarb was reduced to 15 mg daily and her valproic acid was reduced to 250 mg TID (per discussion between hospitalist and patient's primary epileptologist ) Patient has history of breakthrough seizures with any minor change in her medications  Seizures are very subtle and she has history of 4 ictal patterns  · Management per Neurology   ? non formulary rufinamide 1200mg BID  ? perampanel dec to 8mg at bedtime   ? topamax dec to 200mg BID  ? Keppra 1000mg q12  ? valproic acid 250-250-500 --> total 55, free pending   ?  phenobarb dec to 20mg at bedtime --> level 12 5 today

## 2018-08-22 NOTE — DISCHARGE INSTR - OTHER ORDERS
Skin care plans:  1-Calazime to sacrum, buttocks TID and PRN  2-Hydraguard to bilateral heel BID and PRN  3-Elevate heels to offload pressure  4-Soft care cushion when out of bed  5-Turn/repoisiton q2h or when medically stable for pressure re-distribution on skin    6-Moisturize skin daily with skin nourishing cream

## 2018-08-22 NOTE — PROGRESS NOTES
Epilepsy Consult Daily Progress Note   Odilia Raphael EDUJVU 41 y o  female   : 1980  MRN: [id number]     Unit/Bed#: [facility] 703-01    Encounter: [id number]  -------------------------------------------------------------------------------------------------------------------  S: Interval history:  No seizures or significant clinical events since yesterday  Her Valproate dose was decreased yesterday  This morning, on my examination, she is much more awake  Per her 1:1 sitter, she has been a little more active, and has not been sleeping continuously as with previous days  -------------------------------------------------------------------------------------------------------------------    ROS:  A 12 system review of system was not able to be obtained due to intellectual disability      All other review of systems negative   -------------------------------------------------------------------------------------------------------------------  Allergies:    Allergies   Allergen Reactions    Felbamate       Scheduled Meds:     Current Facility-Administered Medications:  acetaminophen 650 mg Per G Tube Q6H PRN Larinda Randal, MD   artificial tear 1 application Ophthalmic TID Ricardo Tee MD   bisacodyl 10 mg Rectal Daily PRN Ricardo Tee MD   enoxaparin 40 mg Subcutaneous Daily Ricardo Tee MD   lactulose 30 g Per G Tube TID Ricardo Tee MD   levETIRAcetam 1,000 mg Per G Tube Q12H [address] Ricardo Tee MD   levOCARNitine 750 mg Per G Tube TID Danilo Red MD   magnesium hydroxide 30 mL Oral Daily PRN Ricardo Tee MD   ondansetron 4 mg Intravenous Q6H PRN Ricardo Tee MD   ondansetron 4 mg Oral Q6H PRN Ricardo Tee MD   Perampanel 8 mg Per G Tube HS Danilo Red MD   PHENobarbital 20 mg Oral HS Danilo Red MD   potassium chloride 20 mEq Per G Tube Daily Ricardo Tee MD   rufinamide 1,200 mg Per G Tube BID Ricardo Tee MD   saccharomyces boulardii 250 mg Per NG Tube BID Kary Ponce MD   senna-docusate sodium 1 tablet Per G Tube Daily Kary Ponce MD   sodium phosphate-biphosphate 1 enema Rectal Daily PRN Kary Ponce MD   topiramate 200 mg Oral Q12H [address] Brent Rogers MD   valproic acid 250 mg Per G Tube TID Richardean Bamberger, MD     PRN Meds:   acetaminophen    bisacodyl    magnesium hydroxide    ondansetron    ondansetron    sodium phosphate-biphosphate  -------------------------------------------------------------------------------------------------------------------  Physical Exam:  Vitals: Blood pressure (!) 88/58, pulse 90, temperature 98 1 °F (36 7 °C), temperature source Axillary, resp  rate 16, height 4' 9" (1 448 m), weight 42 kg (92 lb 9 5 oz), SpO2 93 %  Neurologic Exam:  Lying on her left side with her eyes slightly closed  She awoke and sat up, moving onto the side rail with light touch and loud voice  She did not follow commands, but continued to be awake and was moving around the bed for the rest of my examination  Cranial nerves:   Pupils equally round and reactive  Motor:   Moves all extremities symmetrically after initial stimulation and spontaneously  Decreased tone throughout  Sensation:  Responds to tactile stimulation in all extremities     -------------------------------------------------------------------------------------------------------------------  LAB & TEST RESULTS:    Recent Labs      08/20/18   0446  08/21/18   0541  08/22/18   0445   NA  141  139  141   K  3 8  4 2  3 7   CL  109*  108  108   CO2  24  21  26   BUN  14  18  16   CREATININE  0 50*  0 52*  0 54*   CALCIUM  8 8  8 7  8 4     Recent Labs      08/20/18   0446  08/21/18   0541  08/22/18   0445   ALB  2 6*  2 7*  2 4*  2 6*   ALKPHOS  114  114  118*  116   ALT  171*  167*  196*  156*   AST  166*  160*  199*  127*     Phenytoin Lvl   Date Value Ref Range Status   02/22/2017 <0 4 (L) 10 0 - 20 0 ug/mL Final     Comment:     3   02/16/2017 <0 4 (L) 10 0 - 20 0 ug/mL Final     Phenobarbital Lvl   Date Value Ref Range Status   08/22/2018 12 5 (L) 15 0 - 40 0 ug/mL Final   08/20/2018 12 0 (L) 15 0 - 40 0 ug/mL Final   08/16/2018 16 0 15 0 - 40 0 ug/mL Final     Valproic Acid, Total   Date Value Ref Range Status   08/22/2018 55 50 - 100 ug/mL Final   08/20/2018 73 50 - 100 ug/mL Final   08/16/2018 56 50 - 100 ug/mL Final     Topiramate Lvl   Date Value Ref Range Status   08/20/2018 8 1 2 0 - 25 0 ug/mL Final     Comment:                                     Detection Limit = 1 0   11/22/2017 7 1 2 0 - 25 0 ug/mL Final     Comment:                                     Detection Limit = 1 0   10/24/2017 8 2 2 0 - 25 0 ug/mL Final     Comment:                                     Detection Limit = 1 0     Ammonia   Date Value Ref Range Status   08/22/2018 98 (H) 11 - 35 umol/L Final     Comment:       Specimen collection should occur prior to Sulfasalazine administration due to the potential for falsely elevated results  Specimen collection should occur prior to Sulfapyridine administration due to the potential for falsely depressed results  08/20/2018 78 (H) 11 - 35 umol/L Final     Comment:       Specimen collection should occur prior to Sulfasalazine administration due to the potential for falsely elevated results  Specimen collection should occur prior to Sulfapyridine administration due to the potential for falsely depressed results  08/17/2018 71 (H) 11 - 35 umol/L Final     Comment:       Specimen collection should occur prior to Sulfapyridine administration due to the potential for falsely depressed results        -------------------------------------------------------------------------------------------------------------------  Assessment/Plan:  Isidro Mena is a 41 y o  female with intractable symptomatic generalized epilepsy, Lennox-Gastaut syndrome, and 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 (variable duration of wakefulness and sleep) presenting to hospital with increased sedation, somnolence, and hypercapnia  She is more awake today, which is likely more close to her clinical baseline  Her Valproate level is a little lower today  Her free level was high with a comparable total valproate level, so I think it may be best to decrease her dose a little further to Valproate 250 mg three times a day  It will be important to follow her drug levels and clinical exam going forward  As before, we will need to monitor for breakthrough seizures  Recommendations:   Epilepsy:    - continue Topiramate 200 mg twice a day, phenobarbital 20 mg nightly, perampanel 8 mg nightly, rufinamide 1200 mg twice a day, and Levetiracetam 46069 mg twice a day  - decrease Valproate to 250 mg three times a day  Will recheck levels in the morning tomorrow  - Will recheck drug levels later this week and await recent free valproate level  - Will need to monitor for breakthrough seizures as her doses are adjusted  Hyperammonemia:   - Will continue to follow  She will continue levocarnitine unchanged     - urine organic acids, plasma amino acids, acylcarnitine, and carnitine levels are pending    - continue lactulose      -------------------------------------------------------------------------------------------------------------------  Ky Houser MD        Date: 8/22/2018     Time: 2:09 PM   [address] [facility] [place]

## 2018-08-22 NOTE — ASSESSMENT & PLAN NOTE
· Patient presented with AMS and somnolence from nursing facility  She was admitted to [facility] 8/13 to 8/17  There was question of underlying pneumonia although PCT level was low and antibiotics were d/c  She was noted to have hyperammonemia although this is chronic  Found to have elevated LFTs and Depakote, phenobarbital doses were decreased  Fycompa was not on formulary and suspect patient was having subtle breakthrough seizures likely contributing to her somnolence    · Mental status at baseline waxes and wanes with intermittent periods of hyper and hypoactivity  · Continue to monitor mental status - fluctuates throughout the day and has been stable over the last few days - suspect patients new baseline   · One-to-one continuous observation  · Medication regimen per Neurology

## 2018-08-22 NOTE — ASSESSMENT & PLAN NOTE
· Unknown etiology, patient started on phenobarbital in April 2018  LFTs back in March were normal   According to epileptologist, liver enzymes were also normal in May  There was no further lab work to assess LFTs apart from lab work on admission  · Possibly 2/2 AEDs, particularly phenobarbital  US wnl  CMP has trended down/stabilized     · Ceruloplasmin normal, ch hep panel neg, SM ab neg, mitochondrial ab neg, RAFITA neg

## 2018-08-22 NOTE — PROGRESS NOTES
Progress Note - Aroldo Amend 1980, 41 y o  female MRN: [id number]  [facility] Encounter: [id number] DOS: 8/22/18  Primary Care Provider: Sierra Ross DO   Date and time admitted to hospital: 8/17/2018  9:28 PM    * Acute encephalopathy   Assessment & Plan    · Patient presented with AMS and somnolence from nursing facility  She was admitted to [address] [place] [address] 8/13 to 8/17  There was question of underlying pneumonia although PCT level was low and antibiotics were d/c  She was noted to have hyperammonemia although this is chronic  Found to have elevated LFTs and Depakote, phenobarbital doses were decreased  Fycompa was not on formulary and suspect patient was having subtle breakthrough seizures likely contributing to her somnolence  · Mental status at baseline waxes and wanes with intermittent periods of hyper and hypoactivity  · Continue to monitor mental status - fluctuates throughout the day and has been stable over the last few days - suspect patients new baseline   · One-to-one continuous observation  · Medication regimen per Neurology         Transaminitis   Assessment & Plan    · Unknown etiology, patient started on phenobarbital in April 2018  LFTs back in March were normal   According to epileptologist, liver enzymes were also normal in May  There was no further lab work to assess LFTs apart from lab work on admission  · Possibly 2/2 AEDs, particularly phenobarbital  US wnl  CMP has trended down/stabilized  · Ceruloplasmin normal, ch hep panel neg, SM ab neg, mitochondrial ab neg, RAFITA neg        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with tonic seizures (HCC)   Assessment & Plan    · While at [address] her Manuel Colon was not on formulary, her phenobarb was reduced to 15 mg daily and her valproic acid was reduced to 250 mg TID (per discussion between hospitalist and patient's primary epileptologist ) Patient has history of breakthrough seizures with any minor change in her medications   Seizures are very subtle and she has history of 4 ictal patterns  · Management per Neurology   ? non formulary rufinamide 1200mg BID  ? perampanel dec to 8mg at bedtime   ? topamax dec to 200mg BID  ? Keppra 1000mg q12  ? valproic acid 250-250-500 --> total 55, free pending   ? phenobarb dec to 20mg at bedtime --> level 12 5 today        Hypotension   Assessment & Plan    · BP runs low at baseline   · Monitor, IVF as needed, consider midodrine         Hyperammonemia (HCC)   Assessment & Plan    · Ammonia tends to fluctuate in this patient, Likely secondary to valproic acid  · Can continue lactulose, levocarnitine, Xifaxan (although unclear if there is any benefit of continuing Xifaxin)  · Appreciate GI input   · Per epileptologist, pending urine organic acid, plasma amino acid, plasma acylcarnitine levels, carnitine level         Intellectual disability   Assessment & Plan    · Supportive care        Dysphagia   Assessment & Plan    · Tolerating tube feeds          VTE Pharmacologic Prophylaxis:   Pharmacologic: Enoxaparin (Lovenox)  Mechanical VTE Prophylaxis in Place: Yes    Patient Centered Rounds: I have performed bedside rounds with nursing staff today  Discussions with Specialists or Other Care Team Provider: nursing, will d/w cm     Education and Discussions with Family / Patient: patient, will call mother     Time Spent for Care: 30 minutes  More than 50% of total time spent on counseling and coordination of care as described above  Current Length of Stay: 5 day(s)    Current Patient Status: Inpatient   Certification Statement: The patient will continue to require additional inpatient hospital stay due to close monitoring of mental status, antiepileptic medication dose adjustments, neurology workup on going     Discharge Plan / Estimated Discharge Date: pending improvement in mental status     Code Status: Level 1 - Full Code      Subjective:   Pt seen and examined at bedside  Opens eyes and arousable   Takes deep breaths  Nonverbal     Objective:     Vitals:   Temp (24hrs), Av 6 °F (37 °C), Min:98 1 °F (36 7 °C), Max:98 9 °F (37 2 °C)    HR:  [69-92] 90  Resp:  [16] 16  BP: (80-94)/(55-64) 80/60  SpO2:  [93 %-98 %] 93 %  Body mass index is 20 04 kg/m²  Input and Output Summary (last 24 hours): Intake/Output Summary (Last 24 hours) at 18 0068  Last data filed at 18 0141   Gross per 24 hour   Intake              800 ml   Output                0 ml   Net              800 ml     Physical Exam:     Physical Exam   Constitutional: She appears well-developed  Appears younger than stated age    HENT:   Head: Normocephalic and atraumatic  Mouth/Throat: Oropharynx is clear and moist    Eyes: EOM are normal  No scleral icterus  Neck: Normal range of motion  Neck supple  Cardiovascular: Normal rate, regular rhythm and normal heart sounds  No murmur heard  Pulmonary/Chest: Effort normal and breath sounds normal    Takes deep breaths    Abdominal: Soft  Bowel sounds are normal    Musculoskeletal: Normal range of motion  She exhibits no edema  Neurological: She is alert  Skin: Skin is warm  Papular rash on cheeks    Psychiatric:   Nonverbal, opens eyes, easily arousable     Additional Data:    Labs:      Results from last 7 days  Lab Units 18  0507   WBC Thousand/uL 9 79   HEMOGLOBIN g/dL 12 8   HEMATOCRIT % 39 0   PLATELETS Thousands/uL 260  255   NEUTROS PCT % 49   LYMPHS PCT % 34   MONOS PCT % 11   EOS PCT % 5       Results from last 7 days  Lab Units 18  0445   SODIUM mmol/L 141   POTASSIUM mmol/L 3 7   CHLORIDE mmol/L 108   CO2 mmol/L 26   BUN mg/dL 16   CREATININE mg/dL 0 54*   CALCIUM mg/dL 8 4   TOTAL PROTEIN g/dL 6 4   BILIRUBIN TOTAL mg/dL 0 37   ALK PHOS U/L 116   ALT U/L 156*   AST U/L 127*   GLUCOSE RANDOM mg/dL 169*       Results from last 7 days  Lab Units 18  0558   INR  1 12       * I Have Reviewed All Lab Data Listed Above    * Additional Pertinent Lab Tests Reviewed: Maddie 66 Admission Reviewed    Imaging:    Imaging Reports Reviewed Today Include: all  Imaging Personally Reviewed by Myself Includes:  none    Recent Cultures (last 7 days):           Last 24 Hours Medication List:     Current Facility-Administered Medications:  acetaminophen 650 mg Per G Tube Q6H PRN Ilean Ahumada, MD   artificial tear 1 application Ophthalmic TID Ilean Ahumada, MD   bisacodyl 10 mg Rectal Daily PRN Ilean Ahumada, MD   enoxaparin 40 mg Subcutaneous Daily Ilean Ahumada, MD   lactulose 30 g Per G Tube TID Ilean Ahumada, MD   levETIRAcetam 1,000 mg Per G Tube Q12H [facility] Ilean Ahumada, MD   levOCARNitine 750 mg Per G Tube TID Michelle Guajardo MD   magnesium hydroxide 30 mL Oral Daily PRN Ilean Ahumada, MD   ondansetron 4 mg Intravenous Q6H PRN Ilean Ahumada, MD   ondansetron 4 mg Oral Q6H PRN Ilean Ahumada, MD   Perampanel 8 mg Per G Tube HS Michelle Guajardo MD   PHENobarbital 20 mg Oral HS Michelle Guajardo MD   potassium chloride 20 mEq Per G Tube Daily Ilean Ahumada, MD   rufinamide 1,200 mg Per G Tube BID Ilean Ahumada, MD   saccharomyces boulardii 250 mg Per NG Tube BID Ilean Ahumada, MD   senna-docusate sodium 1 tablet Per G Tube Daily Ilean Ahumada, MD   sodium phosphate-biphosphate 1 enema Rectal Daily PRN Ilean Ahumada, MD   topiramate 200 mg Oral Q12H [facility] Michelle Guajardo MD   valproic acid 250 mg Per G Tube 2 times per day Jose Nuñez MD   valproic acid 500 mg Oral Daily Jose Nuñez MD        Today, Patient Was Seen By: Alfonso Aguayo PA-C    ** Please Note: This note has been constructed using a voice recognition system   **

## 2018-08-22 NOTE — CONSULTS
Consult Note - Wound   Chrales Mix 41 y o  female MRN: [id number]  [facility]counter: [id number]      History and Present Illness:  Patient is 41 year of female presenting from skilled nursing facility with change in mental status  She is severely disable, non verbal and fully dependent for all ADL's with urinary and stool incontinence  Assessment Findings:   1- Mid  cleft with linear moisture associated skin damage with blanching erythema and maceration  Patient currently have a 1:1 person at bed side, she moves around bed with ongoing incontinence care, bilateral inner thigh with mild erythema and maceration from moisture  Skin care plans:  1-Calazime to sacrum, buttocks TID and PRN  2-Hydraguard to bilateral heel BID and PRN  3-Elevate heels to offload pressure  4-Soft care cushion when out of bed  5-Turn/repoisiton q2h or when medically stable for pressure re-distribution on skin  6-Moisturize skin daily with skin nourishing cream        Vitals: Blood pressure (!) 88/58, pulse 90, temperature 98 1 °F (36 7 °C), temperature source Axillary, resp  rate 16, height 4' 9" (1 448 m), weight 42 kg (92 lb 9 5 oz), SpO2 93 %  ,Body mass index is 20 04 kg/m²        Wound 18 Moisture associated skin damage Sacrum 1cm by  3 sacrum slit (Active)   Wound Description Pink 2018 12:30 PM   Alisson-wound Assessment Maceration 2018 12:30 PM   Wound Length (cm) 1 cm 2018 12:30 PM   Wound Width (cm) 0 3 cm 2018 12:30 PM   Wound Depth (cm) 0 1 2018 12:30 PM   Calculated Wound Volume (cm^3) 0 03 cm^3 2018 12:30 PM   Non-staged Wound Description Partial thickness 2018 12:30 PM   Treatments Cleansed;Site care 2018 12:30 PM   Dressing Protective barrier 2018  9:00 AM   Patient Tolerance Tolerated well 2018 12:30 PM   Dressing Status Open to air 2018  9:00 AM       Our skin care recommendations were placed as orders, Calazime and offloading is started  Wound care is signing off, please all ext [phone] or [phone] with questions or concerns      Brynn Morse RN, BSN, [facility]

## 2018-08-22 NOTE — ASSESSMENT & PLAN NOTE
· Ammonia tends to fluctuate in this patient, Likely secondary to valproic acid      · Can continue lactulose, levocarnitine, Xifaxan (although unclear if there is any benefit of continuing Xifaxin)  · Appreciate GI input   · Per epileptologist, pending urine organic acid, plasma amino acid, plasma acylcarnitine levels, carnitine level

## 2018-08-23 PROBLEM — R32 INCONTINENCE: Status: ACTIVE | Noted: 2018-08-23

## 2018-08-23 LAB
3OH-BUTYRCARN SERPL-SCNC: 0.08 UMOL/L (ref 0–0.09)
3OH-IV+2ME3OH-BUTYR CARN SERPL-SCNC: 0.07 UMOL/L (ref 0–0.06)
3OH-LINOLEOYLCARN SERPL-SCNC: 0.01 UMOL/L (ref 0–0.01)
3OH-OLEOYLCARN SERPL-SCNC: 0.04 UMOL/L (ref 0–0.02)
3OH-PALMITOLEYLCARN SERPL-SCNC: 0.01 UMOL/L (ref 0–0.02)
3OH-PALMITOYLCARN SERPL-SCNC: 0.03 UMOL/L (ref 0–0.02)
3OH-STEAROYLCARN SERPL-SCNC: 0.05 UMOL/L (ref 0–0.02)
3OH-TDECANOYLCARN SERPL-SCNC: 0.02 UMOL/L (ref 0–0.02)
ACETYLCARN SERPL-SCNC: 16.65 UMOL/L (ref 3.23–10.29)
ACYLCARNITINE PATTERN SERPL-IMP: ABNORMAL
ALBUMIN SERPL BCP-MCNC: 2.8 G/DL (ref 3.5–5)
ALP SERPL-CCNC: 124 U/L (ref 46–116)
ALT SERPL W P-5'-P-CCNC: 146 U/L (ref 12–78)
AMINO ACID, QN URINE: ABNORMAL
ANION GAP SERPL CALCULATED.3IONS-SCNC: 8 MMOL/L (ref 4–13)
AST SERPL W P-5'-P-CCNC: 106 U/L (ref 5–45)
BILIRUB SERPL-MCNC: 0.44 MG/DL (ref 0.2–1)
BUN SERPL-MCNC: 16 MG/DL (ref 5–25)
BUTYRYL+ISOBUTYRYLCARN SERPL-SCNC: 0.26 UMOL/L (ref 0.08–0.32)
CALCIUM SERPL-MCNC: 9.1 MG/DL (ref 8.3–10.1)
CHLORIDE SERPL-SCNC: 107 MMOL/L (ref 100–108)
CO2 SERPL-SCNC: 28 MMOL/L (ref 21–32)
CREAT SERPL-MCNC: 0.59 MG/DL (ref 0.6–1.3)
DECADIENOYLCARN SERPL-SCNC: 0.02 UMOL/L (ref 0–0.05)
DECANOYLCARN SERPL-SCNC: 0.11 UMOL/L (ref 0–0.38)
DECENOYLCARN SERPL-SCNC: 0.07 UMOL/L (ref 0.01–0.32)
DODECANOYLCARN SERPL-SCNC: 0.04 UMOL/L (ref 0–0.15)
GFR SERPL CREATININE-BSD FRML MDRD: 117 ML/MIN/1.73SQ M
GLUCOSE SERPL-MCNC: 94 MG/DL (ref 65–140)
GLUTARYLCARN SERPL-SCNC: 0.05 UMOL/L (ref 0–0.1)
HEXANOYLCARN SERPL-SCNC: 0.06 UMOL/L (ref 0–0.1)
ISOVALERYL+MEBUTYRYLCARN SERPL-SCNC: 0.06 UMOL/L (ref 0.01–0.21)
LAB DIRECTOR NAME PROVIDER: ABNORMAL
LINOLEOYLCARN SERPL-SCNC: 0.06 UMOL/L (ref 0–0.11)
MALONYLCARN SERPL-SCNC: 0.05 UMOL/L (ref 0.02–0.12)
ME-MALONYLCARN SERPL-SCNC: 0.04 UMOL/L (ref 0.01–0.07)
OCTANOYLCARN SERPL-SCNC: 0.1 UMOL/L (ref 0–0.27)
OLEOYLCARN SERPL-SCNC: 0.09 UMOL/L (ref 0.04–0.17)
PALMITOLEYLCARN SERPL-SCNC: 0.03 UMOL/L (ref 0–0.04)
PALMITOYLCARN SERPL-SCNC: 0.08 UMOL/L (ref 0.03–0.13)
POTASSIUM SERPL-SCNC: 4 MMOL/L (ref 3.5–5.3)
PROPIONYLCARN SERPL-SCNC: 0.74 UMOL/L (ref 0.16–0.62)
PROT SERPL-MCNC: 6.9 G/DL (ref 6.4–8.2)
REF LAB TEST METHOD: ABNORMAL
SODIUM SERPL-SCNC: 143 MMOL/L (ref 136–145)
STEAROYLCARN SERPL-SCNC: 0.02 UMOL/L (ref 0–0.07)
TDECADIENOYLCARN SERPL-SCNC: 0.04 UMOL/L (ref 0–0.11)
TDECANOYLCARN SERPL-SCNC: 0.08 UMOL/L (ref 0–0.06)
TDECENOYLCARN SERPL-SCNC: 0.1 UMOL/L (ref 0–0.17)
TGLY+MTHYL (C5:1) SERPL-SCNC: 0.01 UMOL/L (ref 0–0.02)
VALPROATE SERPL-MCNC: 57 UG/ML (ref 50–100)

## 2018-08-23 PROCEDURE — 80164 ASSAY DIPROPYLACETIC ACD TOT: CPT | Performed by: PSYCHIATRY & NEUROLOGY

## 2018-08-23 PROCEDURE — 99232 SBSQ HOSP IP/OBS MODERATE 35: CPT | Performed by: PSYCHIATRY & NEUROLOGY

## 2018-08-23 PROCEDURE — 80053 COMPREHEN METABOLIC PANEL: CPT | Performed by: PHYSICIAN ASSISTANT

## 2018-08-23 PROCEDURE — 99232 SBSQ HOSP IP/OBS MODERATE 35: CPT | Performed by: PHYSICIAN ASSISTANT

## 2018-08-23 RX ADMIN — MINERAL OIL AND WHITE PETROLATUM 1 APPLICATION: 30; 940 OINTMENT OPHTHALMIC at 22:11

## 2018-08-23 RX ADMIN — RUFINAMIDE 1200 MG: 200 TABLET, FILM COATED ORAL at 17:33

## 2018-08-23 RX ADMIN — POTASSIUM CHLORIDE 20 MEQ: 20 SOLUTION ORAL at 09:37

## 2018-08-23 RX ADMIN — LACTULOSE 30 G: 20 SOLUTION ORAL at 09:35

## 2018-08-23 RX ADMIN — LEVOCARNITINE 750 MG: 1 SOLUTION ORAL at 17:33

## 2018-08-23 RX ADMIN — LEVETIRACETAM 1000 MG: 100 SOLUTION ORAL at 11:49

## 2018-08-23 RX ADMIN — MINERAL OIL AND WHITE PETROLATUM 1 APPLICATION: 30; 940 OINTMENT OPHTHALMIC at 17:33

## 2018-08-23 RX ADMIN — LEVOCARNITINE 750 MG: 1 SOLUTION ORAL at 09:38

## 2018-08-23 RX ADMIN — MINERAL OIL AND WHITE PETROLATUM 1 APPLICATION: 30; 940 OINTMENT OPHTHALMIC at 02:10

## 2018-08-23 RX ADMIN — TOPIRAMATE 200 MG: 100 TABLET, FILM COATED ORAL at 21:48

## 2018-08-23 RX ADMIN — VALPROIC ACID 250 MG: 500 SOLUTION ORAL at 21:49

## 2018-08-23 RX ADMIN — Medication 250 MG: at 17:33

## 2018-08-23 RX ADMIN — LEVOCARNITINE 750 MG: 1 SOLUTION ORAL at 21:49

## 2018-08-23 RX ADMIN — LACTULOSE 30 G: 20 SOLUTION ORAL at 17:32

## 2018-08-23 RX ADMIN — LEVOCARNITINE 750 MG: 1 SOLUTION ORAL at 02:10

## 2018-08-23 RX ADMIN — LEVETIRACETAM 1000 MG: 100 SOLUTION ORAL at 02:10

## 2018-08-23 RX ADMIN — VALPROIC ACID 250 MG: 500 SOLUTION ORAL at 09:36

## 2018-08-23 RX ADMIN — Medication 250 MG: at 09:37

## 2018-08-23 RX ADMIN — MINERAL OIL AND WHITE PETROLATUM 1 APPLICATION: 30; 940 OINTMENT OPHTHALMIC at 09:39

## 2018-08-23 RX ADMIN — PERAMPANEL 8 MG: 4 TABLET ORAL at 21:48

## 2018-08-23 RX ADMIN — ENOXAPARIN SODIUM 40 MG: 40 INJECTION SUBCUTANEOUS at 09:36

## 2018-08-23 RX ADMIN — LACTULOSE 30 G: 20 SOLUTION ORAL at 21:48

## 2018-08-23 RX ADMIN — VALPROIC ACID 250 MG: 500 SOLUTION ORAL at 17:32

## 2018-08-23 RX ADMIN — PHENOBARBITAL 20 MG: 20 LIQUID ORAL at 21:53

## 2018-08-23 RX ADMIN — TOPIRAMATE 200 MG: 100 TABLET, FILM COATED ORAL at 09:37

## 2018-08-23 NOTE — ASSESSMENT & PLAN NOTE
· While at [address] her Ashlie Ross was not on formulary, her phenobarb was reduced to 15 mg daily and her valproic acid was reduced to 250 mg TID (per discussion between hospitalist and patient's primary epileptologist ) Patient has history of breakthrough seizures with any minor change in her medications  Seizures are very subtle and she has history of 4 ictal patterns  ? Management per Neurology - monitor for breakthrough seizures with changes in AEDs  ? non formulary rufinamide 1200mg BID  ? perampanel dec to 8mg at bedtime   ? topamax dec to 200mg BID  ? Keppra 1000mg q12  ? valproic acid 250-250-250 --> total 57, free pending   ?  phenobarb dec to 20mg at bedtime --> level 12 5

## 2018-08-23 NOTE — SOCIAL WORK
CM was informed by Lizbet Toledo PA-C and Dr Jenny Morris that pt is a tentative dc tomorrow  CM called [facility] and spoke to Yaw in admissions to inform her of same

## 2018-08-23 NOTE — ASSESSMENT & PLAN NOTE
· BP runs low at baseline   · Monitor, IVF as needed, consider midodrine   · Unable to assess if symptomatic given nonverbal/somnolent state

## 2018-08-23 NOTE — ASSESSMENT & PLAN NOTE
· Of stool and urine   · continuing ongoing incontinence care   · appreciate wound care consult for moisture associated skin damage

## 2018-08-23 NOTE — PROGRESS NOTES
Epilepsy Consult Daily Progress Note   Becky Virk 41 y o  female   : 1980  MRN: [id number]     Unit/Bed#: [facility] 722-01    Encounter: [id number]  -------------------------------------------------------------------------------------------------------------------  S: Interval history:  Again, no events yesterday  She is more arousable today and per her PCA, she has been more interactive  Her LFTs have trended down further and her Valproate level is more appropriate     -------------------------------------------------------------------------------------------------------------------    ROS:  A 12 system review of system was not able to be obtained due to intellectual disability      All other review of systems negative   -------------------------------------------------------------------------------------------------------------------  Allergies:    Allergies   Allergen Reactions    Felbamate       Scheduled Meds:     Current Facility-Administered Medications:  acetaminophen 650 mg Per G Tube Q6H PRN Ricardo Tee MD   artificial tear 1 application Ophthalmic TID Ricardo Tee MD   bisacodyl 10 mg Rectal Daily PRN Ricardo Tee MD   enoxaparin 40 mg Subcutaneous Daily Ricardo Tee MD   lactulose 30 g Per G Tube TID Ricardo Tee MD   levETIRAcetam 1,000 mg Per G Tube Q12H [facility] Ricardo Tee MD   levOCARNitine 750 mg Per G Tube TID Danilo Red MD   magnesium hydroxide 30 mL Oral Daily PRN Ricardo Tee MD   ondansetron 4 mg Intravenous Q6H PRN Ricardo Tee MD   ondansetron 4 mg Oral Q6H PRN Ricardo Tee MD   Perampanel 8 mg Per G Tube HS Danilo Red MD   PHENobarbital 20 mg Oral HS Danilo Red MD   potassium chloride 20 mEq Per G Tube Daily Ricardo Tee MD   rufinamide 1,200 mg Per G Tube BID Ricardo Tee MD   saccharomyces boulardii 250 mg Per NG Tube BID Ricardo Tee MD   senna-docusate sodium 1 tablet Per G Tube Daily [facility] Tiffanie Palma MD   sodium phosphate-biphosphate 1 enema Rectal Daily PRN Jonh Sandy MD   topiramate 200 mg Oral Q12H [facility] Rubina Ruby MD   valproic acid 250 mg Per G Tube TID Mili Canada MD     PRN Meds:   acetaminophen    bisacodyl    magnesium hydroxide    ondansetron    ondansetron    sodium phosphate-biphosphate  -------------------------------------------------------------------------------------------------------------------  Physical Exam:  Vitals: Blood pressure (!) 75/51, pulse 68, temperature 97 5 °F (36 4 °C), temperature source Axillary, resp  rate 18, height 4' 9" (1 448 m), weight 41 3 kg (91 lb 0 8 oz), SpO2 97 %  Neurologic Exam:  Asleep, but arousable to loud voice and light touch  She sat up in bed and would move all extremities  she would withdrawal from noxious stimulation  Cranial nerves:   Pupils equally round and reactive  Motor:   Moves all extremities symmetrically after initial stimulation and spontaneously  Decreased tone throughout  Sensation:  Responds to tactile stimulation in all extremities     -------------------------------------------------------------------------------------------------------------------  LAB & TEST RESULTS:    Recent Labs      08/21/18   0541  08/22/18   0445  08/23/18   0605   NA  139  141  143   K  4 2  3 7  4 0   CL  108  108  107   CO2  21  26  28   BUN  18  16  16   CREATININE  0 52*  0 54*  0 59*   CALCIUM  8 7  8 4  9 1     Recent Labs      08/21/18   0541  08/22/18   0445  08/23/18   0605   ALB  2 4*  2 6*  2 8*   ALKPHOS  118*  116  124*   ALT  196*  156*  146*   AST  199*  127*  106*       Valproic Acid, Total   Date Value Ref Range Status   08/23/2018 57 50 - 100 ug/mL Final   08/22/2018 55 50 - 100 ug/mL Final   08/20/2018 73 50 - 100 ug/mL Final     -------------------------------------------------------------------------------------------------------------------  Assessment/Plan:  Brittany Guerra is a 41 y o  female with intractable symptomatic generalized epilepsy, Lennox-Gastaut syndrome, and 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 (variable duration of wakefulness and sleep) presenting to hospital with increased sedation, somnolence, and hypercapnia  She continues to be more awake  Her LFTs and drug levels are returning to her typical levels  Her Valproate dose has been decreased, and at this point, since she is doing better, I would not change her dose today  If she continues to do well, she may be able to be discharged back to her SNF tomorrow  I would recommend rechecking her levels in about a week to assure they remain adequate  Recommendations:   Epilepsy:    - continue Topiramate 200 mg twice a day, phenobarbital 20 mg nightly, perampanel 8 mg nightly, rufinamide 1200 mg twice a day, Levetiracetam 27245 mg twice a day, and Valproate to 250 mg three times a day  - Would recommend rechecking drug levels in about one week after discharge   - Will need to monitor for breakthrough seizures as her doses are adjusted  Hyperammonemia:   - Will continue to follow  She will continue levocarnitine unchanged     - urine organic acids, plasma amino acids, and carnitine level were all normal  Acylcarnitine is still pending    - continue lactulose      -------------------------------------------------------------------------------------------------------------------  Geri Steiner MD        Date: 8/23/2018     Time: 3:15 PM   [address] [facility] [place]

## 2018-08-23 NOTE — ASSESSMENT & PLAN NOTE
· Patient presented with AMS and somnolence from nursing facility  She was admitted to [facility] 8/13 to 8/17  There was question of underlying pneumonia although PCT level was low and antibiotics were d/c  She was noted to have hyperammonemia although this is chronic  Found to have elevated LFTs and Depakote, phenobarbital doses were decreased  Fycompa was not on formulary and suspect patient was having subtle breakthrough seizures likely contributing to her somnolence  · Mental status at baseline waxes and wanes with intermittent periods of hyper and hypoactivity  Able to ambulate with assistance     · Continue to monitor mental status - fluctuates throughout the day and has been stable over the last few days   · One-to-one continuous observation  · Medication regimen per Neurology/epileptology

## 2018-08-23 NOTE — PROGRESS NOTES
Progress Note - Linnea Boles 1980, 41 y o  female MRN: [id number]  [facility] Encounter: [id number] DOS: 8/23/18  Primary Care Provider: Evaristo Pillai DO   Date and time admitted to hospital: 8/17/2018  9:28 PM    * Acute encephalopathy   Assessment & Plan    · Patient presented with AMS and somnolence from nursing facility  She was admitted to [facility] 8/13 to 8/17  There was question of underlying pneumonia although PCT level was low and antibiotics were d/c  She was noted to have hyperammonemia although this is chronic  Found to have elevated LFTs and Depakote, phenobarbital doses were decreased  Fycompa was not on formulary and suspect patient was having subtle breakthrough seizures likely contributing to her somnolence  · Mental status at baseline waxes and wanes with intermittent periods of hyper and hypoactivity  Able to ambulate with assistance  · Continue to monitor mental status - fluctuates throughout the day and has been stable over the last few days   · One-to-one continuous observation  · Medication regimen per Neurology/epileptology         Transaminitis   Assessment & Plan    · Unknown etiology, patient started on phenobarbital in April 2018  LFTs back in March were normal   According to epileptologist, liver enzymes were also normal in May  There was no further lab work to assess LFTs apart from lab work on admission  · Possibly 2/2 AEDs, particularly phenobarbital  US wnl  CMP has trended down/stabilized  · Ceruloplasmin normal, ch hep panel neg, SM ab neg, mitochondrial ab neg, RAFITA neg        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with tonic seizures (HCC)   Assessment & Plan    · While at [address] her Sade Hein was not on formulary, her phenobarb was reduced to 15 mg daily and her valproic acid was reduced to 250 mg TID (per discussion between hospitalist and patient's primary epileptologist ) Patient has history of breakthrough seizures with any minor change in her medications  Seizures are very subtle and she has history of 4 ictal patterns  ? Management per Neurology - monitor for breakthrough seizures with changes in AEDs  ? non formulary rufinamide 1200mg BID  ? perampanel dec to 8mg at bedtime   ? topamax dec to 200mg BID  ? Keppra 1000mg q12  ? valproic acid 250-250-250 --> total 57, free pending   ? phenobarb dec to 20mg at bedtime --> level 12 5         Incontinence   Assessment & Plan    · Of stool and urine   · continuing ongoing incontinence care   · appreciate wound care consult for moisture associated skin damage        Hypotension   Assessment & Plan    · BP runs low at baseline   · Monitor, IVF as needed, consider midodrine   · Unable to assess if symptomatic given nonverbal/somnolent state        Hyperammonemia (HCC)   Assessment & Plan    · Ammonia tends to fluctuate in this patient, Likely secondary to valproic acid  · Can continue lactulose, levocarnitine, Xifaxan (although unclear if there is any benefit of continuing Xifaxin)  · Appreciate GI input   · Per epileptologist, pending urine organic acid, plasma amino acid, plasma acylcarnitine levels, carnitine level         Intellectual disability   Assessment & Plan    · Supportive care        Dysphagia   Assessment & Plan    · Tolerating tube feeds          VTE Pharmacologic Prophylaxis:   Pharmacologic: Enoxaparin (Lovenox)  Mechanical VTE Prophylaxis in Place: Yes    Patient Centered Rounds: I have performed bedside rounds with nursing staff today  Discussions with Specialists or Other Care Team Provider: nursing, cm, will d/w neuro     Education and Discussions with Family / Patient: patient, will call mother     Time Spent for Care: 30 minutes  More than 50% of total time spent on counseling and coordination of care as described above      Current Length of Stay: 6 day(s)    Current Patient Status: Inpatient   Certification Statement: The patient will continue to require additional inpatient hospital stay due to ongoing changes in AEDs, monitoring for change in metnal status or breakthrough seizures, safe d/c planning     Discharge Plan / Estimated Discharge Date: pending - possible d/c Saturday     Code Status: Level 1 - Full Code      Subjective:   Pt seen and examined at bedside  No new complaints  Non verbal  Sleeping  Takes deep breaths but did not open eyes this AM      Objective:     Vitals:   Temp (24hrs), Av 2 °F (36 8 °C), Min:97 5 °F (36 4 °C), Max:99 2 °F (37 3 °C)    HR:  [60-86] 68  Resp:  [16-18] 18  BP: (75-91)/(51-67) 75/51  SpO2:  [96 %-97 %] 97 %  Body mass index is 19 7 kg/m²  Input and Output Summary (last 24 hours): Intake/Output Summary (Last 24 hours) at 18 0907  Last data filed at 18 0539   Gross per 24 hour   Intake              400 ml   Output              474 ml   Net              -74 ml       Physical Exam:     Physical Exam   Constitutional: She appears well-developed  Appears younger than stated age    HENT:   Head: Normocephalic and atraumatic  Neck: Normal range of motion  Neck supple  Cardiovascular: Normal rate, regular rhythm and normal heart sounds  No murmur heard  Pulmonary/Chest: Effort normal and breath sounds normal    Abdominal: Soft  Bowel sounds are normal    PEG   Musculoskeletal: Normal range of motion  Neurological: She is alert  Skin: Skin is warm     Papular rash overlying face worse over cheeks    Psychiatric:   Nonverbal, somnolent      Additional Data:     Labs:      Results from last 7 days  Lab Units 18  0507   WBC Thousand/uL 9 79   HEMOGLOBIN g/dL 12 8   HEMATOCRIT % 39 0   PLATELETS Thousands/uL 260  255   NEUTROS PCT % 49   LYMPHS PCT % 34   MONOS PCT % 11   EOS PCT % 5       Results from last 7 days  Lab Units 18  0605   SODIUM mmol/L 143   POTASSIUM mmol/L 4 0   CHLORIDE mmol/L 107   CO2 mmol/L 28   BUN mg/dL 16   CREATININE mg/dL 0 59*   CALCIUM mg/dL 9 1   TOTAL PROTEIN g/dL 6 9   BILIRUBIN TOTAL mg/dL 0 44 ALK PHOS U/L 124*   ALT U/L 146*   AST U/L 106*   GLUCOSE RANDOM mg/dL 94       Results from last 7 days  Lab Units 08/19/18  0558   INR  1 12       * I Have Reviewed All Lab Data Listed Above  * Additional Pertinent Lab Tests Reviewed: Maddie 66 Admission Reviewed    Imaging:    Imaging Reports Reviewed Today Include: all  Imaging Personally Reviewed by Myself Includes:  none    Recent Cultures (last 7 days):           Last 24 Hours Medication List:     Current Facility-Administered Medications:  acetaminophen 650 mg Per G Tube Q6H PRN Ilean Ahumada, MD   artificial tear 1 application Ophthalmic TID Ilean Ahumada, MD   bisacodyl 10 mg Rectal Daily PRN Ilean Ahumada, MD   enoxaparin 40 mg Subcutaneous Daily Ilean Ahumada, MD   lactulose 30 g Per G Tube TID Ilean Ahumada, MD   levETIRAcetam 1,000 mg Per G Tube Q12H [address] Ilean Ahumada, MD   levOCARNitine 750 mg Per G Tube TID Michelle Guajardo MD   magnesium hydroxide 30 mL Oral Daily PRN Ilean Ahumada, MD   ondansetron 4 mg Intravenous Q6H PRN Ilean Ahumada, MD   ondansetron 4 mg Oral Q6H PRN Ilean Ahumada, MD   Perampanel 8 mg Per G Tube HS Michelle Guajardo MD   PHENobarbital 20 mg Oral HS Michelle Guajardo MD   potassium chloride 20 mEq Per G Tube Daily Ilean Ahumada, MD   rufinamide 1,200 mg Per G Tube BID Ilean Ahumada, MD   saccharomyces boulardii 250 mg Per NG Tube BID Ilean Ahumada, MD   senna-docusate sodium 1 tablet Per G Tube Daily Ilean Ahumada, MD   sodium phosphate-biphosphate 1 enema Rectal Daily PRN Ilean Ahumada, MD   topiramate 200 mg Oral Q12H [address] Michelle Guajardo MD   valproic acid 250 mg Per G Tube TID Jose Nuñez MD        Today, Patient Was Seen By: Alfonso Aguayo PA-C    ** Please Note: This note has been constructed using a voice recognition system   **

## 2018-08-24 VITALS
HEIGHT: 57 IN | WEIGHT: 93.47 LBS | SYSTOLIC BLOOD PRESSURE: 86 MMHG | TEMPERATURE: 98.1 F | OXYGEN SATURATION: 99 % | BODY MASS INDEX: 20.17 KG/M2 | HEART RATE: 66 BPM | RESPIRATION RATE: 18 BRPM | DIASTOLIC BLOOD PRESSURE: 60 MMHG

## 2018-08-24 LAB
ALBUMIN SERPL BCP-MCNC: 2.7 G/DL (ref 3.5–5)
ALP SERPL-CCNC: 129 U/L (ref 46–116)
ALT SERPL W P-5'-P-CCNC: 136 U/L (ref 12–78)
ANION GAP SERPL CALCULATED.3IONS-SCNC: 8 MMOL/L (ref 4–13)
AST SERPL W P-5'-P-CCNC: 103 U/L (ref 5–45)
BILIRUB SERPL-MCNC: 0.35 MG/DL (ref 0.2–1)
BUN SERPL-MCNC: 18 MG/DL (ref 5–25)
CALCIUM SERPL-MCNC: 8.8 MG/DL (ref 8.3–10.1)
CHLORIDE SERPL-SCNC: 108 MMOL/L (ref 100–108)
CO2 SERPL-SCNC: 27 MMOL/L (ref 21–32)
CREAT SERPL-MCNC: 0.49 MG/DL (ref 0.6–1.3)
GFR SERPL CREATININE-BSD FRML MDRD: 125 ML/MIN/1.73SQ M
GLUCOSE SERPL-MCNC: 83 MG/DL (ref 65–140)
POTASSIUM SERPL-SCNC: 4.1 MMOL/L (ref 3.5–5.3)
PROT SERPL-MCNC: 6.8 G/DL (ref 6.4–8.2)
SODIUM SERPL-SCNC: 143 MMOL/L (ref 136–145)
VALPROATE FREE SERPL-MCNC: 8.5 UG/ML (ref 6–22)

## 2018-08-24 PROCEDURE — 80053 COMPREHEN METABOLIC PANEL: CPT | Performed by: PHYSICIAN ASSISTANT

## 2018-08-24 PROCEDURE — 99232 SBSQ HOSP IP/OBS MODERATE 35: CPT | Performed by: PSYCHIATRY & NEUROLOGY

## 2018-08-24 PROCEDURE — 99239 HOSP IP/OBS DSCHRG MGMT >30: CPT | Performed by: INTERNAL MEDICINE

## 2018-08-24 RX ORDER — PHENOBARBITAL 20 MG/5ML
ELIXIR ORAL
Qty: 473 ML | Refills: 0
Start: 2018-08-24 | End: 2018-09-04 | Stop reason: SDUPTHER

## 2018-08-24 RX ORDER — VALPROIC ACID 250 MG/5ML
250 SOLUTION ORAL EVERY 8 HOURS
Qty: 950 ML | Refills: 0
Start: 2018-08-24 | End: 2018-09-04 | Stop reason: SDUPTHER

## 2018-08-24 RX ORDER — LEVOCARNITINE 1 G/10ML
750 SOLUTION ORAL 3 TIMES DAILY
Qty: 474 ML | Refills: 0
Start: 2018-08-24 | End: 2018-09-04 | Stop reason: SDUPTHER

## 2018-08-24 RX ORDER — LACTULOSE 20 G/30ML
30 SOLUTION ORAL 3 TIMES DAILY
Refills: 0
Start: 2018-08-24 | End: 2018-10-13 | Stop reason: HOSPADM

## 2018-08-24 RX ADMIN — LEVETIRACETAM 1000 MG: 100 SOLUTION ORAL at 13:06

## 2018-08-24 RX ADMIN — LACTULOSE 30 G: 20 SOLUTION ORAL at 09:41

## 2018-08-24 RX ADMIN — Medication 250 MG: at 09:41

## 2018-08-24 RX ADMIN — TOPIRAMATE 200 MG: 100 TABLET, FILM COATED ORAL at 09:41

## 2018-08-24 RX ADMIN — POTASSIUM CHLORIDE 20 MEQ: 20 SOLUTION ORAL at 09:41

## 2018-08-24 RX ADMIN — VALPROIC ACID 250 MG: 500 SOLUTION ORAL at 15:50

## 2018-08-24 RX ADMIN — LEVOCARNITINE 750 MG: 1 SOLUTION ORAL at 09:42

## 2018-08-24 RX ADMIN — VALPROIC ACID 250 MG: 500 SOLUTION ORAL at 09:41

## 2018-08-24 RX ADMIN — LEVOCARNITINE 750 MG: 1 SOLUTION ORAL at 15:50

## 2018-08-24 RX ADMIN — Medication 250 MG: at 15:50

## 2018-08-24 RX ADMIN — LACTULOSE 30 G: 20 SOLUTION ORAL at 15:51

## 2018-08-24 RX ADMIN — RUFINAMIDE 1200 MG: 200 TABLET, FILM COATED ORAL at 13:06

## 2018-08-24 RX ADMIN — ENOXAPARIN SODIUM 40 MG: 40 INJECTION SUBCUTANEOUS at 09:40

## 2018-08-24 RX ADMIN — LEVETIRACETAM 1000 MG: 100 SOLUTION ORAL at 00:01

## 2018-08-24 NOTE — DISCHARGE SUMMARY
Discharge Summary - [address] [facility]    Patient Information: Linnea Boles 41 y o  female MRN: [id number]  [facility] Encounter: [id number]    Discharging Physician / Practitioner: Anastasia Marc MD  PCP: Evaristo Pillai DO  Admission Date: 8/17/2018  Discharge Date: 08/24/18    Disposition:     Other: SNF    Reason for Admission: Seizure    Discharge Diagnoses:     Principal Problem:    Acute encephalopathy  Active Problems:    Dysphagia    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with tonic seizures (HCC)    Intellectual disability    Hyperammonemia (HCC)    Somnolence    Acute hepatic encephalopathy    Transaminitis    Hypotension    Incontinence  Resolved Problems:    * No resolved hospital problems  *      Consultations During Hospital Stay:  · Neuro, GI     Procedures Performed:     · none    Significant Findings / Test Results:     · Elevated LFTs    Incidental Findings:   · none     Test Results Pending at Discharge (will require follow up):   · none     Outpatient Tests Requested:  · Repeat drug levels in 1 week     Complications:  none    Hospital Course:     Linnea Boles is a 41 y o  female patient who originally presented to the hospital on 8/17/2018 due to increased somnolence  Patient has a longstanding history of epilepsy and was found to have elevated LFTs and elevated ammonia  Patient was seen by Neurology and her antiepileptics were adjusted  Patient was seen by GI  Her ultrasound was unremarkable on hepatitis panel was normal   Her LFTs trended down during the hospitalization  Abnormality was likely drug-induced  Patient was stable and was okay for discharge from a neurological standpoint on August 24th  Condition at Discharge: stable     Discharge Day Visit / Exam:     Subjective:  No seizure activity overnight     Vitals: Blood Pressure: 91/71 (08/24/18 0730)  Pulse: 79 (08/24/18 0730)  Temperature: 97 8 °F (36 6 °C) (08/24/18 0730)  Temp Source: Axillary (08/24/18 0730)  Respirations: 18 (08/24/18 0730)  Height: 4' 9" (144 8 cm) (08/18/18 0755)  Weight - Scale: 42 4 kg (93 lb 7 6 oz) (08/24/18 0600)  SpO2: 96 % (08/24/18 0730)  Exam:   Physical Exam  GEN: NAD, nonresponding much   HEENT: PERRL  CARDIO: s1 s2 RRR  LUNGS: CTA  ABD: Soft, NT/ND  EXT: No edema       Discharge instructions/Information to patient and family:   See after visit summary for information provided to patient and family  Provisions for Follow-Up Care:  See after visit summary for information related to follow-up care and any pertinent home health orders  Discharge Statement:  I spent 35 minutes discharging the patient  This time was spent on the day of discharge  I had direct contact with the patient on the day of discharge  Greater than 50% of the total time was spent examining patient, answering all patient questions, arranging and discussing plan of care with patient as well as directly providing post-discharge instructions  Additional time then spent on discharge activities  Discharge Medications:  See after visit summary for reconciled discharge medications provided to patient and family        ** Please Note: This note has been constructed using a voice recognition system **

## 2018-08-24 NOTE — PLAN OF CARE
DISCHARGE PLANNING     Discharge to home or other facility with appropriate resources Adequate for Discharge        DISCHARGE PLANNING - CARE MANAGEMENT     Discharge to post-acute care or home with appropriate resources Adequate for Discharge        INFECTION - ADULT     Absence or prevention of progression during hospitalization Adequate for Discharge     Absence of fever/infection during neutropenic period Adequate for Discharge        Knowledge Deficit     Patient/family/caregiver demonstrates understanding of disease process, treatment plan, medications, and discharge instructions Adequate for Discharge        Nutrition/Hydration-ADULT     Nutrient/Hydration intake appropriate for improving, restoring or maintaining nutritional needs Adequate for Discharge        PAIN - ADULT     Verbalizes/displays adequate comfort level or baseline comfort level Adequate for Discharge        Potential for Falls     Patient will remain free of falls Adequate for Discharge        Prexisting or High Potential for Compromised Skin Integrity     Skin integrity is maintained or improved Adequate for Discharge        SAFETY ADULT     Patient will remain free of falls Adequate for Discharge     Maintain or return to baseline ADL function Adequate for Discharge     Maintain or return mobility status to optimal level Adequate for Discharge

## 2018-08-24 NOTE — PROGRESS NOTES
Epilepsy Consult Daily Progress Note   Stephen Le 41 y o  female   : 1980  MRN: [id number]     Unit/Bed#: [facility] 722-01    Encounter: [id number]  -------------------------------------------------------------------------------------------------------------------  S: Interval history:  She was more sleepy today, but she has tended to be more sleepy in the mornings, with her being most awake in the afternoons  No seizures or clinical events  -------------------------------------------------------------------------------------------------------------------    ROS:  A 12 system review of system was not able to be obtained due to intellectual disability      All other review of systems negative   -------------------------------------------------------------------------------------------------------------------  Allergies:    Allergies   Allergen Reactions    Felbamate       Scheduled Meds:     Current Facility-Administered Medications:  acetaminophen 650 mg Per G Tube Q6H PRN Calvin Brandon MD   artificial tear 1 application Ophthalmic TID Calvin Brandon MD   bisacodyl 10 mg Rectal Daily PRN Calvin Mattes, MD   enoxaparin 40 mg Subcutaneous Daily Calvin Brandon MD   lactulose 30 g Per G Tube TID Calvin Brandon MD   levETIRAcetam 1,000 mg Per G Tube Q12H [address] Calvin Brandon MD   levOCARNitine 750 mg Per G Tube TID Rahel Grover MD   magnesium hydroxide 30 mL Oral Daily PRN Calvin Brandon MD   ondansetron 4 mg Intravenous Q6H PRN Calvin Brandon MD   ondansetron 4 mg Oral Q6H PRN Calvin Brandon MD   Perampanel 8 mg Per G Tube HS Rahel Grover MD   PHENobarbital 20 mg Oral HS Rahel Grover MD   potassium chloride 20 mEq Per G Tube Daily Calvin Brandon MD   rufinamide 1,200 mg Per G Tube BID Calvin Brandon MD   saccharomyces boulardii 250 mg Per NG Tube BID Calvin Brandon MD   senna-docusate sodium 1 tablet Per G Tube Daily Calvin Brandon MD   sodium phosphate-biphosphate 1 enema Rectal Daily PRN Royce Reece MD   topiramate 200 mg Oral Q12H [address] Hilary France MD   valproic acid 250 mg Per G Tube TID Sushma Wiley MD     PRN Meds:   acetaminophen    bisacodyl    magnesium hydroxide    ondansetron    ondansetron    sodium phosphate-biphosphate  -------------------------------------------------------------------------------------------------------------------  Physical Exam:  Vitals: Blood pressure 91/71, pulse 79, temperature 97 8 °F (36 6 °C), temperature source Axillary, resp  rate 18, height 4' 9" (1 448 m), weight 42 4 kg (93 lb 7 6 oz), SpO2 96 %  Neurologic Exam:  Asleep, but arouse and move all extremities to noxious stimulation and quickly fall asleep     -------------------------------------------------------------------------------------------------------------------  LAB & TEST RESULTS:    Recent Labs      08/22/18 0445 08/23/18   0605 08/24/18   0513   NA  141  143  143   K  3 7  4 0  4 1   CL  108  107  108   CO2  26  28  27   BUN  16  16  18   CREATININE  0 54*  0 59*  0 49*   CALCIUM  8 4  9 1  8 8     Recent Labs      08/22/18 0445 08/23/18   0605  08/24/18   0513   ALB  2 6*  2 8*  2 7*   ALKPHOS  116  124*  129*   ALT  156*  146*  136*   AST  127*  106*  103*       Valproic Acid, Total   Date Value Ref Range Status   08/23/2018 57 50 - 100 ug/mL Final   08/22/2018 55 50 - 100 ug/mL Final   08/20/2018 73 50 - 100 ug/mL Final     -------------------------------------------------------------------------------------------------------------------  Assessment/Plan:  Tamiko Quiroz is a 41 y o  female with intractable symptomatic generalized epilepsy, Lennox-Gastaut syndrome, and 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 (variable duration of wakefulness and sleep) presenting to hospital with increased sedation, somnolence, and hypercapnia      She has been improving over the course of this week as her LFTs and drug levels have been improving  Although she is more sleepy on my examination this morning, she overall has been improving and was more awake in the afternoon yesterday  She has been most awake in the afternoons  At this point, I would recommend keeping her seizures medications unchanged  I would like her to get drug levels checked in about 1 week after discharge  Recommendations:   Epilepsy:    - continue Topiramate 200 mg twice a day, phenobarbital 20 mg nightly, perampanel 8 mg nightly, rufinamide 1200 mg twice a day, Levetiracetam 17850 mg twice a day, and Valproate to 250 mg three times a day  - Would recommend rechecking drug levels in about one week after discharge   - Will need to monitor for breakthrough seizures as her doses are adjusted  Hyperammonemia:   - She will continue levocarnitine unchanged     - urine organic acids, plasma amino acids, acylcarnitine levels and carnitine level were all normal    - continue lactulose      -------------------------------------------------------------------------------------------------------------------  Richardean Bamberger, MD        Date: 8/24/2018     Time: 12:46 PM   [facility] [place]

## 2018-08-24 NOTE — SOCIAL WORK
CM was informed that pt is medically stable for dc today  CM arranged with LT[facility] within [facility] for a 6:45pm dc to [facility]  CM notified pt's bedside RN Carolyn Jones nurse at [facility], and pt's father of dc time  Facility transfer form and CMN completed  Chart copy requested  [facility] referral sent to [facility]

## 2018-09-04 ENCOUNTER — OFFICE VISIT (OUTPATIENT)
Dept: NEUROLOGY | Facility: CLINIC | Age: 38
End: 2018-09-04
Payer: MEDICARE

## 2018-09-04 VITALS — SYSTOLIC BLOOD PRESSURE: 100 MMHG | HEART RATE: 78 BPM | DIASTOLIC BLOOD PRESSURE: 70 MMHG | RESPIRATION RATE: 16 BRPM

## 2018-09-04 DIAGNOSIS — G47.19 EXCESSIVE DAYTIME SLEEPINESS: ICD-10-CM

## 2018-09-04 DIAGNOSIS — G93.40 ACUTE ENCEPHALOPATHY: ICD-10-CM

## 2018-09-04 DIAGNOSIS — E72.20 HYPERAMMONEMIA (HCC): ICD-10-CM

## 2018-09-04 DIAGNOSIS — G40.812 LENNOX-GASTAUT SYNDROME WITH TONIC SEIZURES (HCC): Primary | ICD-10-CM

## 2018-09-04 DIAGNOSIS — R74.8 ELEVATED LIVER ENZYMES: ICD-10-CM

## 2018-09-04 PROCEDURE — 95970 ALYS NPGT W/O PRGRMG: CPT | Performed by: PSYCHIATRY & NEUROLOGY

## 2018-09-04 PROCEDURE — 99215 OFFICE O/P EST HI 40 MIN: CPT | Performed by: PSYCHIATRY & NEUROLOGY

## 2018-09-04 RX ORDER — PHENOBARBITAL 20 MG/5ML
60 ELIXIR ORAL
Qty: 473 ML | Refills: 5 | Status: SHIPPED | OUTPATIENT
Start: 2018-09-04 | End: 2018-12-24 | Stop reason: HOSPADM

## 2018-09-04 RX ORDER — VALPROIC ACID 250 MG/5ML
250 SOLUTION ORAL EVERY 8 HOURS
Qty: 450 ML | Refills: 5 | Status: SHIPPED | OUTPATIENT
Start: 2018-09-04 | End: 2019-02-14

## 2018-09-04 RX ORDER — LEVOCARNITINE 1 G/10ML
750 SOLUTION ORAL 3 TIMES DAILY
Qty: 474 ML | Refills: 7 | Status: SHIPPED | OUTPATIENT
Start: 2018-09-04 | End: 2019-08-22 | Stop reason: SDUPTHER

## 2018-09-04 RX ORDER — RUFINAMIDE 400 MG/1
1200 TABLET, FILM COATED ORAL 2 TIMES DAILY
Qty: 180 TABLET | Refills: 5 | Status: SHIPPED | OUTPATIENT
Start: 2018-09-04 | End: 2019-02-14

## 2018-09-04 RX ORDER — LEVETIRACETAM 100 MG/ML
1000 SOLUTION ORAL EVERY 12 HOURS SCHEDULED
Qty: 600 ML | Refills: 5 | Status: SHIPPED | OUTPATIENT
Start: 2018-09-04 | End: 2019-01-17 | Stop reason: HOSPADM

## 2018-09-04 RX ORDER — TOPIRAMATE 50 MG/1
TABLET, FILM COATED ORAL
Qty: 1 TABLET | Refills: 0 | Status: SHIPPED | OUTPATIENT
Start: 2018-09-04 | End: 2018-09-12 | Stop reason: HOSPADM

## 2018-09-04 NOTE — LETTER
2018     oSla Mills DO  [facility] [id number]    Patient: Emilia Alfredo   YOB: 1980   Date of Visit: 2018       Dear Dr Mehreen Wang:    Thank you for referring Mallory Rosa to me for evaluation  Below are my notes for this consultation  If you have questions, please do not hesitate to call me  I look forward to following your patient along with you  Sincerely,        Geremias Valenzuela MD        CC: No Recipients  Geremias Valenzuela MD  2018 11:47 AM  Sign at close encounter  Patient's Name: Emilia Alfredo   Patient's : 1980   Visit Type: follow-up  Referring MD / PCP:  Sola Mills DO     Assessment:    Ms Mallory Rosa is a 41 y o  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, remote history of anoxic brain injury, h/o status epilepticus (extremely frequent tonic seizures during sleep), severe intellectual disability  She has a VNS due to intractable epilepsy  Ms Jurgen Goldberg needs at least topiramate and valproic acid to prevent seizures (she had a breakthrough seizure when topiramate was stopped, when VPA was discontinued on cEEG monitoring there were more tonic seizures  She had an increase seizure frequency when Fycompa was held  It is unclear if levetiracetam or Banzel has offered much in the way of seizure prevention  We had transitioned her to phenobarbital but without dose adjustment of valproic acid, she had excessive free VPA levels which caused a transaminitis along with increase in ammonia production and excessive sedation  It seems that phenobarbital has helped with seizure control  At this point, it is a difficult place for Ms Bull given the dependency of valproic acid and the toxicity it poses to her  I would like to attempt to wean her off of valproic acid; however, this will be done cautiously  I will reduce her topiramate dose given the combination of topiramate and VPA also causes hyperammonienmia        It is postulated that the excessive ammonia level is due to abnormal mitochondrial activity with valproate exposure  Due to improvement with seizure control with valproate, supplementation with L-carnitine is necessary to counteract hyperammoniemia  She should also continue with lactulose  Larry Brown has intractable epilepsy, unlikely to achieve seizure freedom and she is not a surgical candidate due to the underlying generalized seizures  Plan:   Problem List Items Addressed This Visit        Nervous and Auditory    Acute encephalopathy    Relevant Orders    Valproic acid level, free    Valproic acid level, total    Topiramate level    Phenobarbital level    Hepatic function panel    Ammonia    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with tonic seizures (HCC) - Primary    Relevant Medications    topiramate (TOPAMAX) 50 MG tablet    levETIRAcetam (KEPPRA) 100 mg/mL oral solution    levOCARNitine (CARNITOR) 1 g/10 mL solution    perampanel (FYCOMPA) oral suspension    PHENobarbital 20 mg/5 mL elixir    rufinamide (BANZEL) 400 mg tablet    topiramate (TOPAMAX) 200 MG tablet    valproic acid (DEPAKENE) 250 MG/5ML soln    Other Relevant Orders    Valproic acid level, free    Valproic acid level, total    Topiramate level    Phenobarbital level    Hepatic function panel    Ammonia       Other    Excessive daytime sleepiness    Hyperammonemia (HCC)    Elevated liver enzymes          Patient Instructions   Excessive sedation and hyperammonia due to excessive valproic acid levels  Will attempt to reduce dose as much as possible but there is high risk of breakthrough seizures as in the past when valproic acid was reduced she had a flurry of tonic seizures      PLAN:  1 - Valproic acid 250mg/5mL give 5mL every 8 hours   2 - Banzel 400mg give 3 tabs twice a day   3 - Topiramate 200mg tab one tab twice a day (discontinue 50mg tabs)   4 - Levetiracetam 100mg/mL give 10mL twice a day   5 - Fycompa 0 5mg/mL oral solution give 16mL (8mg) at bedtime   6 - decrease phenobarbital elixir 20mg/5mL to 15mL (60mg) at bedtime  8 - in one week check phenobarbital, topiramate, valproic acid total and free, LFT, ammonia levels  9 - continue with levocarnitine 1gm/10mL give 7 5mL three times a day   10 - follow-up in 3 months, SOVL      Chief Complaint:    Chief Complaint     Seizures        HPI:    Edgardo Caba is a 41 y o  unknown handed female here for follow-up evaluation of intractable epilepsy in the setting of LGS  Interval History 9/4/2018  Ms Vi Madrigal was recently hospitalized (8/18-8/24) for excessive lethargy, found to have significant transaminitis and higher than usual ammonia levels     This was possibly contributed by hyperammonia and polypharmacy  Multiple medications were adjusted with ultimately, suspecting that the valproate was causing most of her symptoms (excessive free levels, when the dose was reduced she appeared to be more alert)  However, her circadian rhythm is all off and she has periods of excessive somnolence and periods of excessive activity  There was an increase in hepatic enzymes when valproate dose was increased)  She was tested for inborn error of metabolism with plasma amino acid, urine organic acid, and acylcarnitine levels  There were nonspecific elevations in some amino acid or organic acid but no pattern consistent with a specific inborn error of metabolism  Elevated carnitine level was consistent with supplementation  I called [facility], I spoke with Rita Lazaro  She is more awake currently  She has been eating more, skips breakfast, at lunch time, she is able to eat  There have not been reported increase in seizure activity or tonic body jerks  There is a slight difference in the recommended doses of AEDs from neurology and what she was discharged on (for example she was suppose to be on phenobarbital 20mg but she was discharged on 20mL)    When she was last in the office in April 2018, her phenobarbital dose was suppose to be 60mg  AED/side effects/compliance:  Valproic acid 250mg/5mL give 5mL three times a day   Banzel 400mg give 3 tabs twice a day   Topiramate one each 50mg and 200mg tab twice a day   Levetiracetam 100mg/mL give 10mL twice a day   Fycompa 0 5mg/5mL give 16mL (8mg) at bedtime (oral solution)  Phenobarbital 20mg/5mL give 20mL (80mg) nightly   - continue with levocarnitine 1gm/10mL give 7 5mL three times a day     Event/Seizure semiology:  Seizure semiology:  1  She was described to have drop attacks or spells with grunting sounds and falling to the floor  2  Her nurse commented on episodes of spasms or myoclonic jerking of the right arm  3  Generalized convulsions  4  Tonic seizures of eyes rolling back (mostly during sleep)    Prior Epilepsy History:  Collective epilepsy history 11/6/2014 was previously evaluated by Dr Ezequiel Maloney including a hospitalization in February 2013 for status epilepticus, in the setting of missed doses of AEDs due to refusal to eat  She subsequently required anesthetic induced coma to stop her seizures and PEG tube was placed  She was seen almost every month for management of her seizures up until November 2013  She has medically refractory seizures and had a VNS placed possibly in the early 2000s and a generator changed in 2011  Unknown AEDs that were tried but felbamate is listed as an allergy  In 2011, her AEDs were clonazepam, levetiracetam, Depakote and Lamictal  Dr Ezequiel Maloney had started [facility] in 2011  In 2012, Dominik Romero was added with the reduction of clonazepam  There were difficulty with documenting seizure frequency; but seizures were no longer daily occurrences but there were clusters of seizures  There would be good periods and bad periods of seizure frequency  Dr Ezequiel Maloney had been increasing the duty cycle of the VNS over the course of 2012 to 2013  Sometime between August 2013 and October 2013, topiramate was introduced     was in status epilepticus in 2013, she was on Keppra, Banzel, Onfi, and Lamictal  She had an EEG at some point that showed multifocal spike-wave and background attenuation  She has intermittent agitation and day time somnolence  When she was hospitalized for status epilepticus, she was started on methylphenidate to help wake her up  Intake history November 2014:   VPA level 127  Her Valproic Acid dose was previously 250mg q6hrs based on Dr Marlee Ralph notes  Since July 2014, she has been receiving VPA 500mg q6hrs  She has not been hospitalized since September 2013  She was previously ambulatory with therapy  She spends most of her time sleeping for the past couple of months  She has also been receiving lactulose for elevated ammonia levels  (older drugs VPA, LVT, LMG, newer drugs added on since 2011 RFN, Onfi, TPM)     Summary of 2015: We decreased VPA from TDD 2000mg to 1000mg with increased agitation/combativeness, alternating days of exhaustion (no behavioral specialist has been involved)  are randomly reported by multiple staff members  Seizures are typically reported by CNA's or her one to one on the 3PM to 11PM shift  Seizures are described a brief events of eyes rolling back and arms going up in the air, followed by hair pulling or slapping herself  These seizures were reported as either sporadic or every other day episodes  There have been no documented grand mal seizures or drop attacks  She refuses to wear safety helmet, rips at her hair, and attempts to flip herself out of her chair and bed  Isabela Tracey can walk briefly but walks on her toes, she frequently will allow herself to fall down when she does not want to walk  It does not appear that methylphenidate has been useful in maintaining her level of wakefulness during the day  Isabela Tracey had her VNS generator change on 11/3/2015  The Model 103 (serial [id number]) was replaced with [employer]onic Model 105, serial [id number]   Weaned off of levetiracetam due to multiple medications and agitation; by the end of 2016, she was down to -500  Summary of 2016:  Started with RFN [id number], -250, CLB 0 5-0 5-25,  QID, -200 attempted to wean off of LVT and reduced the dose of VPA given that there were no reports of seizures and she was sedated, along with elevated ammonia levels  It was unclear as to how effective LVT was for her seizure control  When she missed a dose of TPM in September 2016, she had multiple seizures  We attempted to compensate for increase in seizures by increasing Onfi to 20-20; however, it seems that it made her more sedated  Summary of 2017  RFN [id number], -250, Onfi 20-20, -200,  q8hrs  She has been more somnolent  She continues to have tonic seizures when she is asleep, eyes rolling body, arms will tense up with some twitching, last a few seconds, but will recur  There are no seizures during the daytime  We attempted to wean off of VPA due to ammonia / somnolence; but there was an increase in tonic seizures (tonic stiffness, eyes rolling upwards, and subtle arm (right?) jerking)  She was admitted to hospital 10/10 to 10/26/2017, due to seizures, was put on continuous video EEG monitoring to determine her seizure type, seizure frequency, and rapid medication adjustments  The increased in seizure frequency was likely due to an underlying infection, but also she likely had frequent seizures during sleep which did not cause much injury to the patient  A number of medication adjustments were attempted including discontinuation of lamotrigine due to concern about it worsening seizures, reinstitution of levetiracetam, attempted discontinuation of valproic acid, attempted reduction in Onfi, and starting Fycompa  The patient's seizures were mostly based during sleep, tonic seizures  When valproic acid was discontinued there was an increased number of seizures  Valproic acid was subsequently reinstituted    Patient was discharged on reduced dose of Onfi to keep her more awake during the daytime however this instruction was not forwarded to her nursing home  There does not seem to be a change in degree of alertness or activity with these current medications  Interval history 2018   RFN [id number], Fycompa 8mg daily, LVT 3732-4964, Onfi 5-15, -250,  TID  She was admitted to hospital again in December for pneumonia, put on continuous EEG monitoring and was found to still have frequent tonic seizures while asleep  She required higher doses of VPA to control seizures and lower dose of Onfi to help with excess sedation  Banzel was also decreased  On the last day of cvEEG 2017 - there were still many brief tonic seizures nearly all out of sleep (consisting of rhythmic generalized spike slow waves and paroxysmal fast activity)  Interval History 2018   TID, RUF 1200 BID, -250, LEV [id number], PER 10  Ms  Jurgen Goldberg is more awake today than I have previously seen  I spoke with the patient's unit nurse, Alissa Whitmanhelle Dame was reduced to 10mg at bedtime, less somnolent during the day time but there have been increase in the number of seizures  During the day time, there are more seizure activity (these are the eyes are rolling back and shaking, then stop, then happen again in 10-15 minutes)  These are occurring more so when she is awake in her chair  She is more awake now, she is more alert and eating more  Special Features  Status epilepticus: yes  Self Injury Seizures: No  Precipitating Factors: menstrual cycle? ?     Epilepsy Risk Factors:  Abnormal pregnancy: unknown  Abnormal birth/: unknown  Abnormal Development: unknown developmental history  Febrile seizures, simple: unknown  Febrile seizures, complex: unknown  CNS infection: No  Mental retardation: Yes  Cerebral palsy: Yes  Head injury (moderate/severe): possibly anoxic brain injury  CNS neoplasm: No  CNS malformation: No  Neurosurgical procedure: No  Stroke: No  Alcohol abuse: No  Drug abuse: No  Family history Sz/epilepsy: unknown    Prior AEDs:  Rufinamide  Clobazam (excessive sedation)  Clonazepam  Levetiracetam (unclear if beneficial)  Lamotrigine (unclear if beneficial)  Topiramate (missed doses caused breakthrough seizures)  Valproate (as medication was weaned off, there were increased seizures)  Fycompa  Felbamate (listed as a drug allergy)    Prior workup:  x   Imaging:  CT head 2/21/2013  Normal CT of brain    EEGs:  EEGs:  Continuous video EEG monitoring 10/13  10/15/2017  Frequent generalized spike/polyspike-slow wave complexes during sleep  At times there are independent right more than left midtemporal spikes and bitemporal spikes    Innumerable generalized tonic seizures during sleep, generalized 1 Hz period discharges, that would become continuous for 30 seconds or generalized rhythmic alpha-beta discharges, associated with patient becoming rigid, tonic posturing with arms elevated and tense with subtle jerking of the upper body, eyes opening with upward deviation      Dr Donn Calvillo reported 4 ictal patterns:  1  1-3 seconds of diffuse electrodecrement then 2-7 seconds of fast activity then 2Hz spike wave discharges (no clinical manifestation)  2  same as #1, clinically accompanied by posturing of the arms, then clonic jerking  3  diffuse low fast activity without progressing to spike-wave discharges  4  2 Hz generalized discharges for 2-3 minutes with no clonical manifestations  By 10/15/2017  the tonic clonic seizures were less frequent    Continuous video EEG monitoring 10/18  10/25/2017  Diffuse background slowing with bursts of arrhythmic generalized spike wave discharges, with shifting lateralization, and independent left and  right temporal spikes  Mild eyelid myoclonia associated with brief bursts of 2-2 5 Hz generalized discharges  Tonic seizures with eelctrodecrement  There were innumerable tonic seizures; however by 10/25/2017 the frequency of these seizures did decrease in frequency  Continuous video EEG monitoring 12/1-12/5/2017  There are innumerable tonic seizures that are generally less than one minute in duration (30-40 seconds), these consists of subtle eye opening and tonic stiffening of arms, if longer then whole body stiffening with clonic jerking movements  These almost always occur exclusively out of sleep  These consist of 2-2 5 Hz generalized spike-slow wave complexes with generalized attenuation of activity (desynchronization) or paroxysmal fast activity  Per 24 hours count of seizures could be   Labs:  Component      Latest Ref Rng & Units 8/24/2018   Sodium      136 - 145 mmol/L 143   Potassium      3 5 - 5 3 mmol/L 4 1   Chloride      100 - 108 mmol/L 108   CO2      21 - 32 mmol/L 27   Anion Gap      4 - 13 mmol/L 8   BUN      5 - 25 mg/dL 18   Creatinine      0 60 - 1 30 mg/dL 0 49 (L)   Glucose      65 - 140 mg/dL 83   Calcium      8 3 - 10 1 mg/dL 8 8   AST (SGOT)      5 - 45 U/L 103 (H)   ALT      12 - 78 U/L 136 (H)   ALK PHOS      46 - 116 U/L 129 (H)   Total Protein      6 4 - 8 2 g/dL 6 8   Albumin      3 5 - 5 0 g/dL 2 7 (L)   TOTAL BILIRUBIN      0 20 - 1 00 mg/dL 0 35   eGFR      ml/min/1 73sq m 125     Component      Latest Ref Rng & Units 8/20/2018 8/22/2018 8/23/2018   VALPROIC ACID TOTAL      50 - 100 ug/mL 73 55 57   TOPIRAMATE LEVEL      2 0 - 25 0 ug/mL 8 1     PHENOBARBITAL LEVEL      15 0 - 40 0 ug/mL 12 0 (L) 12 5 (L)    LEVETIRACETA (KEPPRA)      10 0 - 40 0 ug/mL 44 0 (H)     Valproic Acid, Free      6 0 - 22 0 ug/mL 15 8 8 5      Component Ammonia   Latest Ref Rng & Units 11 - 35 umol/L   3/4/2018 29   8/13/2018 40 (H)   8/15/2018 46 (H)   8/16/2018 73 (H)   8/17/2018 71 (H)   8/20/2018 78 (H)   8/22/2018 98 (H)       General exam   Blood pressure 100/70, pulse 78, resp  rate 16    Appearance: excessively sedated, some body movements and head turn with noxious stimulation but does not interact with the examiner, she had a bowel movement during this visit  Carotids: n/a  Cardiovascular: regular rate and rhythm  Pulmonary: unable to hear  Abdominal: soft, nontender, nondistended  Extremities: no edema    HEENT: anicteric and neck is supple   Fundoscopy: not assessed    Mental status  Orientation: nonverbal  Fund of Knowledge: nonverbal   Attention and Concentration: nonverbal  Current and Remote Memory:nonverbal  Language: nonverbal    Cranial Nerves  CN 1: not tested  CN 2: pupils equal round reactive to direct and consenual light   CN 3, 4, 6: EOMI, no nystagmus  CN 5:not assessed  CN 7:muscles of facial expression are symmetric and corneal reflex is intact symmetrically  CN 8:not assessed  CN 9, 10:not assessed  CN 11:not assessed  CN 12:not assessed    Motor:  Bulk, Tone: thin muscle build, relatively hypotonic tone  Pronation: not assessed  Strength: patient did not participate in strength testing    Sensory:  Lighttouch: not assessed due to cognitive impairment    Coordination:  Unable to test    Reflexes:  not assessed    Past Medical/Surgical History:  Patient Active Problem List   Diagnosis    Dysphagia    Acute encephalopathy    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with tonic seizures (HCC)    Lactic acidosis    Gastrostomy tube obstruction (HCC)    Excessive daytime sleepiness    Underweight    Intellectual disability    Hyperammonemia (HCC)    Acute DANIEL (middle ear effusion)    Macrocytic anemia    Hypokalemia    Acute respiratory failure with hypercapnia (HCC)    Hypernatremia    Osteoporosis    Onychomycosis    Lethargy    Hyperkeratosis    Elevated liver enzymes    ADHD, predominantly inattentive type    Intractable epilepsy without status epilepticus ([address] )    Somnolence    Acute hepatic encephalopathy    Transaminitis    Hypotension    Incontinence     Past Medical History:   Diagnosis Date    ADHD     Anoxic brain damage ([address] )     Autistic disorder     Hyperammonemia (HCC)     Hyperkeratosis     Hypotension     Intellectual disability     Intellectual disability     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with tonic seizures (HCC)     Lethargy     Liver enzyme elevation     Onychomycosis     Osteoporosis     Osteoporosis     Psychiatric disorder     anxiety     Past Surgical History:   Procedure Laterality Date    ABDOMINAL SURGERY      CARDIAC PACEMAKER PLACEMENT      JEJUNOSTOMY FEEDING TUBE      PEG TUBE PLACEMENT         Past Psychiatric History:  Behavioral agitation    Medications:    Current Outpatient Prescriptions:     acetaminophen (TYLENOL) 325 mg tablet, 650 mg by Per G Tube route every 6 (six) hours as needed for mild pain, Disp: , Rfl:     bisacodyl (FLEET) 10 MG/30ML ENEM, Insert 10 mg into the rectum as needed for constipation, Disp: , Rfl:     lactulose 20 g/30 mL, 45 mL (30 g total) by Per G Tube route 3 (three) times a day, Disp: , Rfl: 0    levETIRAcetam (KEPPRA) 100 mg/mL oral solution, 10 mL (1,000 mg total) by Per G Tube route every 12 (twelve) hours for 30 days, Disp: 600 mL, Rfl: 5    levOCARNitine (CARNITOR) 1 g/10 mL solution, 7 5 mL (750 mg total) by Per G Tube route 3 (three) times a day, Disp: 474 mL, Rfl: 7    magnesium hydroxide (MILK OF MAGNESIA) 400 mg/5 mL oral suspension, Take 30 mL by mouth daily as needed for constipation, Disp: , Rfl:     MELATONIN PO, 6 mg by Per G Tube route daily at bedtime, Disp: , Rfl:     Multiple Vitamins-Minerals (CENTRUM SILVER PO), 1 tablet by Per G Tube route daily, Disp: , Rfl:     ondansetron (ZOFRAN) 4 mg tablet, 4 mg by Per G Tube route every 8 (eight) hours as needed for nausea or vomiting  , Disp: , Rfl:     perampanel (FYCOMPA) oral suspension, 16 mL (8 mg total) by Per NG Tube route daily at bedtime Per G tube, Disp: 480 mL, Rfl: 5    PHENobarbital 20 mg/5 mL elixir, Take 15 mL (60 mg total) by mouth daily at bedtime, Disp: 473 mL, Rfl: 5    potassium chloride 10 %, 20 mEq by Per G Tube route daily  , Disp: , Rfl:     Probiotic Product (ALEXANDER-BID PROBIOTIC PO), 1 tablet by Per G Tube route daily  , Disp: , Rfl:     rufinamide (BANZEL) 400 mg tablet, 3 tablets (1,200 mg total) by Per G Tube route 2 (two) times a day, Disp: 180 tablet, Rfl: 5    Sennosides-Docusate Sodium (SENEXON-S PO), 1 tablet by Per G Tube route daily  , Disp: , Rfl:     topiramate (TOPAMAX) 200 MG tablet, Take 1 tablet (200 mg total) by mouth every 12 (twelve) hours, Disp: 60 tablet, Rfl: 5    topiramate (TOPAMAX) 50 MG tablet, Discontinue Topiramate 50mg tabs, Disp: 1 tablet, Rfl: 0    valproic acid (DEPAKENE) 250 MG/5ML soln, 5 mL (250 mg total) by Per G Tube route every 8 (eight) hours, Disp: 450 mL, Rfl: 5    Allergies: Allergies   Allergen Reactions    Felbamate        Family history:  History reviewed  No pertinent family history  There is no family history of seizure, epilepsy or developmental delay  Social History  Living situation:  Resident of [facility]  Social History   Substance Use Topics    Smoking status: Never Smoker    Smokeless tobacco: Never Used    Alcohol use No      Review of Systems  A review of at least 12 organ/systems was obtained by the medical assistant and reviewed by me, including additional positives/negatives:   Constitutional: Positive for appetite change  Negative for fever  HENT: Negative  Negative for hearing loss, tinnitus, trouble swallowing and voice change  Eyes: Negative  Negative for photophobia and pain  Respiratory: Negative  Negative for shortness of breath  Cardiovascular: Negative  Negative for palpitations  Gastrointestinal: Positive for diarrhea  Negative for nausea and vomiting  Decision making was of high-complexity due to the patient's high risk condition (seizures), psychiatric and neuropsychological comorbidities, behavioral problems, memory and cognitive problems and medication side effects        The total amount of time spent with the patient was 45 minutes  More than 50% of this time was devoted to counseling and coordination of care  Issues addressed during this clinic visit included overall management, medication counseling or monitoring (including adverse effects, side effects and risks of antiepileptic medications)     Start time: 10:40AM  End time: 11:25AM    Neurology/Epilepsy Procedure Note    VNS Interrogation only    Model: M105  S/N: [id number]  Date of implant: 11/3/2015    Current settings:  Output Current 2 mAmp   Signal Frequency 30 Hz   Pulse Width 500 microsec   Signal On Time 21 sec   Signal Off Time 0 8 min     Magnet settings:  Mag Output 2 25 mAmp   Pulse Width 500 microsec   Mag On Time 60 sec       Diagnostics:  Communication OK  Output current 2mAmp  Lead Impedance OK  Impedance value 1730 Ohms  IFI No  Battery indicator 25-50%    Lifetime Magnet activation 7 (last time 9/26/2016)  % stimulation 36%

## 2018-09-04 NOTE — PROGRESS NOTES
Patient's Name: Jesica Guzman   Patient's : 1980   Visit Type: follow-up  Referring MD / PCP:  Skip Belle DO     Assessment:    Ms Sissy Peterson is a 41 y o  woman with Doree Sailors Syndrome, remote history of anoxic brain injury, h/o status epilepticus (extremely frequent tonic seizures during sleep), severe intellectual disability  She has a VNS due to intractable epilepsy  Ms Bernardino Pulido needs at least topiramate and valproic acid to prevent seizures (she had a breakthrough seizure when topiramate was stopped, when VPA was discontinued on cEEG monitoring there were more tonic seizures  She had an increase seizure frequency when Fycompa was held  It is unclear if levetiracetam or Banzel has offered much in the way of seizure prevention  We had transitioned her to phenobarbital but without dose adjustment of valproic acid, she had excessive free VPA levels which caused a transaminitis along with increase in ammonia production and excessive sedation  It seems that phenobarbital has helped with seizure control  At this point, it is a difficult place for Ms Bull given the dependency of valproic acid and the toxicity it poses to her  I would like to attempt to wean her off of valproic acid; however, this will be done cautiously  I will reduce her topiramate dose given the combination of topiramate and VPA also causes hyperammonienmia  It is postulated that the excessive ammonia level is due to abnormal mitochondrial activity with valproate exposure  Due to improvement with seizure control with valproate, supplementation with L-carnitine is necessary to counteract hyperammoniemia  She should also continue with lactulose  Bernardino Pulido has intractable epilepsy, unlikely to achieve seizure freedom and she is not a surgical candidate due to the underlying generalized seizures       Plan:   Problem List Items Addressed This Visit        Nervous and Auditory    Acute encephalopathy    Relevant Orders Valproic acid level, free    Valproic acid level, total    Topiramate level    Phenobarbital level    Hepatic function panel    Ammonia    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with tonic seizures (HCC) - Primary    Relevant Medications    topiramate (TOPAMAX) 50 MG tablet    levETIRAcetam (KEPPRA) 100 mg/mL oral solution    levOCARNitine (CARNITOR) 1 g/10 mL solution    perampanel (FYCOMPA) oral suspension    PHENobarbital 20 mg/5 mL elixir    rufinamide (BANZEL) 400 mg tablet    topiramate (TOPAMAX) 200 MG tablet    valproic acid (DEPAKENE) 250 MG/5ML soln    Other Relevant Orders    Valproic acid level, free    Valproic acid level, total    Topiramate level    Phenobarbital level    Hepatic function panel    Ammonia       Other    Excessive daytime sleepiness    Hyperammonemia (HCC)    Elevated liver enzymes          Patient Instructions   Excessive sedation and hyperammonia due to excessive valproic acid levels  Will attempt to reduce dose as much as possible but there is high risk of breakthrough seizures as in the past when valproic acid was reduced she had a flurry of tonic seizures  PLAN:  1 - Valproic acid 250mg/5mL give 5mL every 8 hours   2 - Banzel 400mg give 3 tabs twice a day   3 - Topiramate 200mg tab one tab twice a day (discontinue 50mg tabs)   4 - Levetiracetam 100mg/mL give 10mL twice a day   5 - Fycompa 0 5mg/mL oral solution give 16mL (8mg) at bedtime   6 - decrease phenobarbital elixir 20mg/5mL to 15mL (60mg) at bedtime  8 - in one week check phenobarbital, topiramate, valproic acid total and free, LFT, ammonia levels  9 - continue with levocarnitine 1gm/10mL give 7 5mL three times a day   10 - follow-up in 3 months, SOVL      Chief Complaint:    Chief Complaint     Seizures        HPI:    Kurt Schaeffer is a 41 y o  unknown handed female here for follow-up evaluation of intractable epilepsy in the setting of LGS  Interval History 9/4/2018  Ms Aniket Horta was recently hospitalized (8/18-8/24) for excessive lethargy, found to have significant transaminitis and higher than usual ammonia levels     This was possibly contributed by hyperammonia and polypharmacy  Multiple medications were adjusted with ultimately, suspecting that the valproate was causing most of her symptoms (excessive free levels, when the dose was reduced she appeared to be more alert)  However, her circadian rhythm is all off and she has periods of excessive somnolence and periods of excessive activity  There was an increase in hepatic enzymes when valproate dose was increased)  She was tested for inborn error of metabolism with plasma amino acid, urine organic acid, and acylcarnitine levels  There were nonspecific elevations in some amino acid or organic acid but no pattern consistent with a specific inborn error of metabolism  Elevated carnitine level was consistent with supplementation  I called [facility], I spoke with Uyen Galloway  She is more awake currently  She has been eating more, skips breakfast, at lunch time, she is able to eat  There have not been reported increase in seizure activity or tonic body jerks  There is a slight difference in the recommended doses of AEDs from neurology and what she was discharged on (for example she was suppose to be on phenobarbital 20mg but she was discharged on 20mL)  When she was last in the office in April 2018, her phenobarbital dose was suppose to be 60mg  AED/side effects/compliance:  Valproic acid 250mg/5mL give 5mL three times a day   Banzel 400mg give 3 tabs twice a day   Topiramate one each 50mg and 200mg tab twice a day   Levetiracetam 100mg/mL give 10mL twice a day   Fycompa 0 5mg/5mL give 16mL (8mg) at bedtime (oral solution)  Phenobarbital 20mg/5mL give 20mL (80mg) nightly   - continue with levocarnitine 1gm/10mL give 7 5mL three times a day     Event/Seizure semiology:  Seizure semiology:  1   She was described to have drop attacks or spells with grunting sounds and falling to the floor  2  Her nurse commented on episodes of spasms or myoclonic jerking of the right arm  3  Generalized convulsions  4  Tonic seizures of eyes rolling back (mostly during sleep)    Prior Epilepsy History:  Collective epilepsy history 11/6/2014 was previously evaluated by Dr Kary Mandujano including a hospitalization in February 2013 for status epilepticus, in the setting of missed doses of AEDs due to refusal to eat  She subsequently required anesthetic induced coma to stop her seizures and PEG tube was placed  She was seen almost every month for management of her seizures up until November 2013  She has medically refractory seizures and had a VNS placed possibly in the early 2000s and a generator changed in 2011  Unknown AEDs that were tried but felbamate is listed as an allergy  In 2011, her AEDs were clonazepam, levetiracetam, Depakote and Lamictal  Dr Kary Mandujano had started [facility] in 2011  In 2012, Pepper Fake was added with the reduction of clonazepam  There were difficulty with documenting seizure frequency; but seizures were no longer daily occurrences but there were clusters of seizures  There would be good periods and bad periods of seizure frequency  Dr Kary Mandujano had been increasing the duty cycle of the VNS over the course of 2012 to 2013  Sometime between August 2013 and October 2013, topiramate was introduced  was in status epilepticus in 2013, she was on Keppra, Banzel, Onfi, and Lamictal  She had an EEG at some point that showed multifocal spike-wave and background attenuation  She has intermittent agitation and day time somnolence  When she was hospitalized for status epilepticus, she was started on methylphenidate to help wake her up  Intake history November 2014:   VPA level 127  Her Valproic Acid dose was previously 250mg q6hrs based on Dr Kizzy Larson notes  Since July 2014, she has been receiving VPA 500mg q6hrs  She has not been hospitalized since September 2013   She was previously ambulatory with therapy  She spends most of her time sleeping for the past couple of months  She has also been receiving lactulose for elevated ammonia levels  (older drugs VPA, LVT, LMG, newer drugs added on since 2011 RFN, Onfi, TPM)     Summary of 2015: We decreased VPA from TDD 2000mg to 1000mg with increased agitation/combativeness, alternating days of exhaustion (no behavioral specialist has been involved)  are randomly reported by multiple staff members  Seizures are typically reported by CNA's or her one to one on the 3PM to 11PM shift  Seizures are described a brief events of eyes rolling back and arms going up in the air, followed by hair pulling or slapping herself  These seizures were reported as either sporadic or every other day episodes  There have been no documented grand mal seizures or drop attacks  She refuses to wear safety helmet, rips at her hair, and attempts to flip herself out of her chair and bed  Reva Esteban can walk briefly but walks on her toes, she frequently will allow herself to fall down when she does not want to walk  It does not appear that methylphenidate has been useful in maintaining her level of wakefulness during the day  Reva Esteban had her VNS generator change on 11/3/2015  The Model 103 (serial [id number]) was replaced with [employer] Model 105, serial [id number]  Weaned off of levetiracetam due to multiple medications and agitation; by the end of 2016, she was down to -500  Summary of 2016:  Started with RFN [id number], -250, CLB 0 5-0 5-25,  QID, -200 attempted to wean off of LVT and reduced the dose of VPA given that there were no reports of seizures and she was sedated, along with elevated ammonia levels  It was unclear as to how effective LVT was for her seizure control  When she missed a dose of TPM in September 2016, she had multiple seizures    We attempted to compensate for increase in seizures by increasing Onfi to 20-20; however, it seems that it made her more sedated  Summary of 2017  RFN [id number], -250, Onfi 20-20, -200,  q8hrs  She has been more somnolent  She continues to have tonic seizures when she is asleep, eyes rolling body, arms will tense up with some twitching, last a few seconds, but will recur  There are no seizures during the daytime  We attempted to wean off of VPA due to ammonia / somnolence; but there was an increase in tonic seizures (tonic stiffness, eyes rolling upwards, and subtle arm (right?) jerking)  She was admitted to hospital 10/10 to 10/26/2017, due to seizures, was put on continuous video EEG monitoring to determine her seizure type, seizure frequency, and rapid medication adjustments  The increased in seizure frequency was likely due to an underlying infection, but also she likely had frequent seizures during sleep which did not cause much injury to the patient  A number of medication adjustments were attempted including discontinuation of lamotrigine due to concern about it worsening seizures, reinstitution of levetiracetam, attempted discontinuation of valproic acid, attempted reduction in Onfi, and starting Fycompa  The patient's seizures were mostly based during sleep, tonic seizures  When valproic acid was discontinued there was an increased number of seizures  Valproic acid was subsequently reinstituted  Patient was discharged on reduced dose of Onfi to keep her more awake during the daytime however this instruction was not forwarded to her nursing home  There does not seem to be a change in degree of alertness or activity with these current medications  Interval history 1/22/2018   RFN [id number], Fycompa 8mg daily, LVT 8350-8132, Onfi 5-15, -250,  TID  She was admitted to hospital again in December for pneumonia, put on continuous EEG monitoring and was found to still have frequent tonic seizures while asleep    She required higher doses of VPA to control seizures and lower dose of Onfi to help with excess sedation  Banzel was also decreased  On the last day of cvEEG 2017 - there were still many brief tonic seizures nearly all out of sleep (consisting of rhythmic generalized spike slow waves and paroxysmal fast activity)  Interval History 2018   TID, RUF 1200 BID, -250, LEV 1935-2521, PER 10  Ms  Coleen Lorenzana is more awake today than I have previously seen  I spoke with the patient's unit nurse, Latricia Carey  Ayesha Rede was reduced to 10mg at bedtime, less somnolent during the day time but there have been increase in the number of seizures  During the day time, there are more seizure activity (these are the eyes are rolling back and shaking, then stop, then happen again in 10-15 minutes)  These are occurring more so when she is awake in her chair  She is more awake now, she is more alert and eating more  Special Features  Status epilepticus: yes  Self Injury Seizures: No  Precipitating Factors: menstrual cycle? ?     Epilepsy Risk Factors:  Abnormal pregnancy: unknown  Abnormal birth/: unknown  Abnormal Development: unknown developmental history  Febrile seizures, simple: unknown  Febrile seizures, complex: unknown  CNS infection: No  Mental retardation: Yes  Cerebral palsy: Yes  Head injury (moderate/severe): possibly anoxic brain injury  CNS neoplasm: No  CNS malformation: No  Neurosurgical procedure: No  Stroke: No  Alcohol abuse: No  Drug abuse: No  Family history Sz/epilepsy: unknown    Prior AEDs:  Rufinamide  Clobazam (excessive sedation)  Clonazepam  Levetiracetam (unclear if beneficial)  Lamotrigine (unclear if beneficial)  Topiramate (missed doses caused breakthrough seizures)  Valproate (as medication was weaned off, there were increased seizures)  Fycompa  Felbamate (listed as a drug allergy)    Prior workup:  x   Imaging:  CT head 2013  Normal CT of brain    EEGs:  EEGs:  Continuous video EEG monitoring 10/13 - 10/15/2017  Frequent generalized spike/polyspike-slow wave complexes during sleep  At times there are independent right more than left midtemporal spikes and bitemporal spikes    Innumerable generalized tonic seizures during sleep, generalized 1 Hz period discharges, that would become continuous for 30 seconds or generalized rhythmic alpha-beta discharges, associated with patient becoming rigid, tonic posturing with arms elevated and tense with subtle jerking of the upper body, eyes opening with upward deviation  Dr Vikash Crawford reported 4 ictal patterns:  1 - 1-3 seconds of diffuse electrodecrement then 2-7 seconds of fast activity then 2Hz spike wave discharges (no clinical manifestation)  2 - same as #1, clinically accompanied by posturing of the arms, then clonic jerking  3 - diffuse low fast activity without progressing to spike-wave discharges  4 - 2 Hz generalized discharges for 2-3 minutes with no clonical manifestations  By 10/15/2017 - the tonic clonic seizures were less frequent    Continuous video EEG monitoring 10/18 - 10/25/2017  Diffuse background slowing with bursts of arrhythmic generalized spike wave discharges, with shifting lateralization, and independent left and  right temporal spikes  Mild eyelid myoclonia associated with brief bursts of 2-2 5 Hz generalized discharges  Tonic seizures with eelctrodecrement  There were innumerable tonic seizures; however by 10/25/2017 the frequency of these seizures did decrease in frequency  Continuous video EEG monitoring 12/1-12/5/2017  There are innumerable tonic seizures that are generally less than one minute in duration (30-40 seconds), these consists of subtle eye opening and tonic stiffening of arms, if longer then whole body stiffening with clonic jerking movements  These almost always occur exclusively out of sleep    These consist of 2-2 5 Hz generalized spike-slow wave complexes with generalized attenuation of activity (desynchronization) or paroxysmal fast activity  Per 24 hours count of seizures could be   Labs:  Component      Latest Ref Rng & Units 8/24/2018   Sodium      136 - 145 mmol/L 143   Potassium      3 5 - 5 3 mmol/L 4 1   Chloride      100 - 108 mmol/L 108   CO2      21 - 32 mmol/L 27   Anion Gap      4 - 13 mmol/L 8   BUN      5 - 25 mg/dL 18   Creatinine      0 60 - 1 30 mg/dL 0 49 (L)   Glucose      65 - 140 mg/dL 83   Calcium      8 3 - 10 1 mg/dL 8 8   AST (SGOT)      5 - 45 U/L 103 (H)   ALT      12 - 78 U/L 136 (H)   ALK PHOS      46 - 116 U/L 129 (H)   Total Protein      6 4 - 8 2 g/dL 6 8   Albumin      3 5 - 5 0 g/dL 2 7 (L)   TOTAL BILIRUBIN      0 20 - 1 00 mg/dL 0 35   eGFR      ml/min/1 73sq m 125     Component      Latest Ref Rng & Units 8/20/2018 8/22/2018 8/23/2018   VALPROIC ACID TOTAL      50 - 100 ug/mL 73 55 57   TOPIRAMATE LEVEL      2 0 - 25 0 ug/mL 8 1     PHENOBARBITAL LEVEL      15 0 - 40 0 ug/mL 12 0 (L) 12 5 (L)    LEVETIRACETA (KEPPRA)      10 0 - 40 0 ug/mL 44 0 (H)     Valproic Acid, Free      6 0 - 22 0 ug/mL 15 8 8 5      Component Ammonia   Latest Ref Rng & Units 11 - 35 umol/L   3/4/2018 29   8/13/2018 40 (H)   8/15/2018 46 (H)   8/16/2018 73 (H)   8/17/2018 71 (H)   8/20/2018 78 (H)   8/22/2018 98 (H)       General exam   Blood pressure 100/70, pulse 78, resp  rate 16    Appearance: excessively sedated, some body movements and head turn with noxious stimulation but does not interact with the examiner, she had a bowel movement during this visit  Carotids: n/a  Cardiovascular: regular rate and rhythm  Pulmonary: unable to hear  Abdominal: soft, nontender, nondistended  Extremities: no edema    HEENT: anicteric and neck is supple   Fundoscopy: not assessed    Mental status  Orientation: nonverbal  Fund of Knowledge: nonverbal   Attention and Concentration: nonverbal  Current and Remote Memory:nonverbal  Language: nonverbal    Cranial Nerves  CN 1: not tested  CN 2: pupils equal round reactive to direct and consenual light   CN 3, 4, 6: EOMI, no nystagmus  CN 5:not assessed  CN 7:muscles of facial expression are symmetric and corneal reflex is intact symmetrically  CN 8:not assessed  CN 9, 10:not assessed  CN 11:not assessed  CN 12:not assessed    Motor:  Bulk, Tone: thin muscle build, relatively hypotonic tone  Pronation: not assessed  Strength: patient did not participate in strength testing    Sensory:  Lighttouch: not assessed due to cognitive impairment    Coordination:  Unable to test    Reflexes:  not assessed    Past Medical/Surgical History:  Patient Active Problem List   Diagnosis    Dysphagia    Acute encephalopathy    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with tonic seizures (HCC)    Lactic acidosis    Gastrostomy tube obstruction (HCC)    Excessive daytime sleepiness    Underweight    Intellectual disability    Hyperammonemia (HCC)    Acute DANIEL (middle ear effusion)    Macrocytic anemia    Hypokalemia    Acute respiratory failure with hypercapnia (HCC)    Hypernatremia    Osteoporosis    Onychomycosis    Lethargy    Hyperkeratosis    Elevated liver enzymes    ADHD, predominantly inattentive type    Intractable epilepsy without status epilepticus (HCC)    Somnolence    Acute hepatic encephalopathy    Transaminitis    Hypotension    Incontinence     Past Medical History:   Diagnosis Date    ADHD     Anoxic brain damage (HCC)     Autistic disorder     Hyperammonemia (HCC)     Hyperkeratosis     Hypotension     Intellectual disability     Intellectual disability     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with tonic seizures (HCC)     Lethargy     Liver enzyme elevation     Onychomycosis     Osteoporosis     Osteoporosis     Psychiatric disorder     anxiety     Past Surgical History:   Procedure Laterality Date    ABDOMINAL SURGERY      CARDIAC PACEMAKER PLACEMENT      JEJUNOSTOMY FEEDING TUBE      PEG TUBE PLACEMENT         Past Psychiatric History:  Behavioral agitation    Medications:    Current Outpatient Prescriptions:     acetaminophen (TYLENOL) 325 mg tablet, 650 mg by Per G Tube route every 6 (six) hours as needed for mild pain, Disp: , Rfl:     bisacodyl (FLEET) 10 MG/30ML ENEM, Insert 10 mg into the rectum as needed for constipation, Disp: , Rfl:     lactulose 20 g/30 mL, 45 mL (30 g total) by Per G Tube route 3 (three) times a day, Disp: , Rfl: 0    levETIRAcetam (KEPPRA) 100 mg/mL oral solution, 10 mL (1,000 mg total) by Per G Tube route every 12 (twelve) hours for 30 days, Disp: 600 mL, Rfl: 5    levOCARNitine (CARNITOR) 1 g/10 mL solution, 7 5 mL (750 mg total) by Per G Tube route 3 (three) times a day, Disp: 474 mL, Rfl: 7    magnesium hydroxide (MILK OF MAGNESIA) 400 mg/5 mL oral suspension, Take 30 mL by mouth daily as needed for constipation, Disp: , Rfl:     MELATONIN PO, 6 mg by Per G Tube route daily at bedtime, Disp: , Rfl:     Multiple Vitamins-Minerals (CENTRUM SILVER PO), 1 tablet by Per G Tube route daily, Disp: , Rfl:     ondansetron (ZOFRAN) 4 mg tablet, 4 mg by Per G Tube route every 8 (eight) hours as needed for nausea or vomiting  , Disp: , Rfl:     perampanel (FYCOMPA) oral suspension, 16 mL (8 mg total) by Per NG Tube route daily at bedtime Per G tube, Disp: 480 mL, Rfl: 5    PHENobarbital 20 mg/5 mL elixir, Take 15 mL (60 mg total) by mouth daily at bedtime, Disp: 473 mL, Rfl: 5    potassium chloride 10 %, 20 mEq by Per G Tube route daily  , Disp: , Rfl:     Probiotic Product (ALEXANDER-BID PROBIOTIC PO), 1 tablet by Per G Tube route daily  , Disp: , Rfl:     rufinamide (BANZEL) 400 mg tablet, 3 tablets (1,200 mg total) by Per G Tube route 2 (two) times a day, Disp: 180 tablet, Rfl: 5    Sennosides-Docusate Sodium (SENEXON-S PO), 1 tablet by Per G Tube route daily  , Disp: , Rfl:     topiramate (TOPAMAX) 200 MG tablet, Take 1 tablet (200 mg total) by mouth every 12 (twelve) hours, Disp: 60 tablet, Rfl: 5    topiramate (TOPAMAX) 50 MG tablet, Discontinue Topiramate 50mg tabs, Disp: 1 tablet, Rfl: 0    valproic acid (DEPAKENE) 250 MG/5ML soln, 5 mL (250 mg total) by Per G Tube route every 8 (eight) hours, Disp: 450 mL, Rfl: 5    Allergies: Allergies   Allergen Reactions    Felbamate        Family history:  History reviewed  No pertinent family history  There is no family history of seizure, epilepsy or developmental delay  Social History  Living situation:  Resident of [facility]  Social History   Substance Use Topics    Smoking status: Never Smoker    Smokeless tobacco: Never Used    Alcohol use No      Review of Systems  A review of at least 12 organ/systems was obtained by the medical assistant and reviewed by me, including additional positives/negatives:   Constitutional: Positive for appetite change  Negative for fever  HENT: Negative  Negative for hearing loss, tinnitus, trouble swallowing and voice change  Eyes: Negative  Negative for photophobia and pain  Respiratory: Negative  Negative for shortness of breath  Cardiovascular: Negative  Negative for palpitations  Gastrointestinal: Positive for diarrhea  Negative for nausea and vomiting  Decision making was of high-complexity due to the patient's high risk condition (seizures), psychiatric and neuropsychological comorbidities, behavioral problems, memory and cognitive problems and medication side effects  The total amount of time spent with the patient was 45 minutes  More than 50% of this time was devoted to counseling and coordination of care  Issues addressed during this clinic visit included overall management, medication counseling or monitoring (including adverse effects, side effects and risks of antiepileptic medications)     Start time: 10:40AM  End time: 11:25AM    Neurology/Epilepsy Procedure Note    VNS Interrogation only    Model: M105  S/N: [id number]  Date of implant: 11/3/2015    Current settings:  Output Current 2 mAmp   Signal Frequency 30 Hz   Pulse Width 500 microsec   Signal On Time 21 sec   Signal Off Time 0 8 min     Magnet settings:  Mag Output 2 25 mAmp   Pulse Width 500 microsec   Mag On Time 60 sec       Diagnostics:  Communication OK  Output current 2mAmp  Lead Impedance OK  Impedance value 1730 Ohms  IFI No  Battery indicator 25-50%    Lifetime Magnet activation 7 (last time 9/26/2016)  % stimulation 36%

## 2018-09-04 NOTE — PATIENT INSTRUCTIONS
Excessive sedation and hyperammonia due to excessive valproic acid levels  Will attempt to reduce dose as much as possible but there is high risk of breakthrough seizures as in the past when valproic acid was reduced she had a flurry of tonic seizures      PLAN:  1 - Valproic acid 250mg/5mL give 5mL every 8 hours   2 - Banzel 400mg give 3 tabs twice a day   3 - Topiramate 200mg tab one tab twice a day (discontinue 50mg tabs)   4 - Levetiracetam 100mg/mL give 10mL twice a day   5 - Fycompa 0 5mg/mL oral solution give 16mL (8mg) at bedtime   6 - decrease phenobarbital elixir 20mg/5mL to 15mL (60mg) at bedtime  8 - in one week check phenobarbital, topiramate, valproic acid total and free, LFT, ammonia levels  9 - continue with levocarnitine 1gm/10mL give 7 5mL three times a day   10 - follow-up in 3 months, SOVL

## 2018-09-06 ENCOUNTER — HOSPITAL ENCOUNTER (INPATIENT)
Facility: HOSPITAL | Age: 38
LOS: 5 days | Discharge: NURSING, REHAB OR LONG-TERM CARE | DRG: 871 | End: 2018-09-12
Attending: EMERGENCY MEDICINE | Admitting: INTERNAL MEDICINE
Payer: MEDICARE

## 2018-09-06 ENCOUNTER — APPOINTMENT (EMERGENCY)
Dept: RADIOLOGY | Facility: HOSPITAL | Age: 38
DRG: 871 | End: 2018-09-06
Payer: MEDICARE

## 2018-09-06 DIAGNOSIS — R63.6 UNDERWEIGHT: ICD-10-CM

## 2018-09-06 DIAGNOSIS — A41.9 SEPSIS DUE TO UNDETERMINED ORGANISM (HCC): ICD-10-CM

## 2018-09-06 DIAGNOSIS — R56.9 SEIZURE (HCC): Primary | ICD-10-CM

## 2018-09-06 LAB
ALBUMIN SERPL BCP-MCNC: 2.5 G/DL (ref 3.5–5)
ALP SERPL-CCNC: 133 U/L (ref 46–116)
ALT SERPL W P-5'-P-CCNC: 122 U/L (ref 12–78)
ANION GAP SERPL CALCULATED.3IONS-SCNC: 12 MMOL/L (ref 4–13)
ARTERIAL PATENCY WRIST A: YES
AST SERPL W P-5'-P-CCNC: 153 U/L (ref 5–45)
BASE EXCESS BLDA CALC-SCNC: -5.8 MMOL/L
BASOPHILS # BLD AUTO: 0.06 THOUSANDS/ΜL (ref 0–0.1)
BASOPHILS NFR BLD AUTO: 1 % (ref 0–1)
BILIRUB SERPL-MCNC: 0.4 MG/DL (ref 0.2–1)
BUN SERPL-MCNC: 9 MG/DL (ref 5–25)
CALCIUM SERPL-MCNC: 8.2 MG/DL (ref 8.3–10.1)
CHLORIDE SERPL-SCNC: 103 MMOL/L (ref 100–108)
CO2 SERPL-SCNC: 20 MMOL/L (ref 21–32)
CREAT SERPL-MCNC: 0.6 MG/DL (ref 0.6–1.3)
EOSINOPHIL # BLD AUTO: 0.27 THOUSAND/ΜL (ref 0–0.61)
EOSINOPHIL NFR BLD AUTO: 5 % (ref 0–6)
ERYTHROCYTE [DISTWIDTH] IN BLOOD BY AUTOMATED COUNT: 14 % (ref 11.6–15.1)
GFR SERPL CREATININE-BSD FRML MDRD: 117 ML/MIN/1.73SQ M
GLUCOSE SERPL-MCNC: 194 MG/DL (ref 65–140)
GLUCOSE SERPL-MCNC: 196 MG/DL (ref 65–140)
HCO3 BLDA-SCNC: 20.4 MMOL/L (ref 22–28)
HCT VFR BLD AUTO: 44.4 % (ref 34.8–46.1)
HGB BLD-MCNC: 14.3 G/DL (ref 11.5–15.4)
IMM GRANULOCYTES # BLD AUTO: 0.03 THOUSAND/UL (ref 0–0.2)
IMM GRANULOCYTES NFR BLD AUTO: 1 % (ref 0–2)
LACTATE SERPL-SCNC: 3.3 MMOL/L (ref 0.5–2)
LYMPHOCYTES # BLD AUTO: 1.99 THOUSANDS/ΜL (ref 0.6–4.47)
LYMPHOCYTES NFR BLD AUTO: 34 % (ref 14–44)
MCH RBC QN AUTO: 33.5 PG (ref 26.8–34.3)
MCHC RBC AUTO-ENTMCNC: 32.2 G/DL (ref 31.4–37.4)
MCV RBC AUTO: 104 FL (ref 82–98)
MONOCYTES # BLD AUTO: 0.56 THOUSAND/ΜL (ref 0.17–1.22)
MONOCYTES NFR BLD AUTO: 9 % (ref 4–12)
NASAL CANNULA: ABNORMAL
NEUTROPHILS # BLD AUTO: 3.03 THOUSANDS/ΜL (ref 1.85–7.62)
NEUTS SEG NFR BLD AUTO: 50 % (ref 43–75)
NRBC BLD AUTO-RTO: 0 /100 WBCS
O2 CT BLDA-SCNC: 20.4 ML/DL (ref 16–23)
OXYHGB MFR BLDA: 97.3 % (ref 94–97)
PCO2 BLDA: 42.9 MM HG (ref 36–44)
PH BLDA: 7.3 [PH] (ref 7.35–7.45)
PLATELET # BLD AUTO: 206 THOUSANDS/UL (ref 149–390)
PMV BLD AUTO: 12.4 FL (ref 8.9–12.7)
PO2 BLDA: 111.5 MM HG (ref 75–129)
POTASSIUM SERPL-SCNC: 4.5 MMOL/L (ref 3.5–5.3)
PROT SERPL-MCNC: 6.9 G/DL (ref 6.4–8.2)
RBC # BLD AUTO: 4.27 MILLION/UL (ref 3.81–5.12)
SODIUM SERPL-SCNC: 135 MMOL/L (ref 136–145)
VALPROATE SERPL-MCNC: 72 UG/ML (ref 50–100)
WBC # BLD AUTO: 5.94 THOUSAND/UL (ref 4.31–10.16)

## 2018-09-06 PROCEDURE — 36415 COLL VENOUS BLD VENIPUNCTURE: CPT | Performed by: EMERGENCY MEDICINE

## 2018-09-06 PROCEDURE — 80201 ASSAY OF TOPIRAMATE: CPT | Performed by: EMERGENCY MEDICINE

## 2018-09-06 PROCEDURE — 82805 BLOOD GASES W/O2 SATURATION: CPT | Performed by: EMERGENCY MEDICINE

## 2018-09-06 PROCEDURE — 80184 ASSAY OF PHENOBARBITAL: CPT | Performed by: EMERGENCY MEDICINE

## 2018-09-06 PROCEDURE — 36600 WITHDRAWAL OF ARTERIAL BLOOD: CPT

## 2018-09-06 PROCEDURE — 80177 DRUG SCRN QUAN LEVETIRACETAM: CPT | Performed by: EMERGENCY MEDICINE

## 2018-09-06 PROCEDURE — 85025 COMPLETE CBC W/AUTO DIFF WBC: CPT | Performed by: EMERGENCY MEDICINE

## 2018-09-06 PROCEDURE — 83605 ASSAY OF LACTIC ACID: CPT | Performed by: EMERGENCY MEDICINE

## 2018-09-06 PROCEDURE — 82948 REAGENT STRIP/BLOOD GLUCOSE: CPT

## 2018-09-06 PROCEDURE — 80053 COMPREHEN METABOLIC PANEL: CPT | Performed by: EMERGENCY MEDICINE

## 2018-09-06 PROCEDURE — 80164 ASSAY DIPROPYLACETIC ACD TOT: CPT | Performed by: EMERGENCY MEDICINE

## 2018-09-06 PROCEDURE — 71045 X-RAY EXAM CHEST 1 VIEW: CPT

## 2018-09-07 ENCOUNTER — APPOINTMENT (INPATIENT)
Dept: CT IMAGING | Facility: HOSPITAL | Age: 38
DRG: 871 | End: 2018-09-07
Payer: MEDICARE

## 2018-09-07 PROBLEM — A41.9 SEPSIS DUE TO UNDETERMINED ORGANISM (HCC): Status: ACTIVE | Noted: 2018-09-07

## 2018-09-07 LAB
ALBUMIN SERPL BCP-MCNC: 2.5 G/DL (ref 3.5–5)
ALP SERPL-CCNC: 125 U/L (ref 46–116)
ALT SERPL W P-5'-P-CCNC: 106 U/L (ref 12–78)
AMYLASE SERPL-CCNC: 79 IU/L (ref 25–115)
ANION GAP SERPL CALCULATED.3IONS-SCNC: 9 MMOL/L (ref 4–13)
APTT PPP: 31 SECONDS (ref 24–36)
AST SERPL W P-5'-P-CCNC: 98 U/L (ref 5–45)
BILIRUB SERPL-MCNC: 0.6 MG/DL (ref 0.2–1)
BUN SERPL-MCNC: 8 MG/DL (ref 5–25)
CALCIUM SERPL-MCNC: 8.2 MG/DL (ref 8.3–10.1)
CHLORIDE SERPL-SCNC: 107 MMOL/L (ref 100–108)
CO2 SERPL-SCNC: 23 MMOL/L (ref 21–32)
CREAT SERPL-MCNC: 0.59 MG/DL (ref 0.6–1.3)
ERYTHROCYTE [DISTWIDTH] IN BLOOD BY AUTOMATED COUNT: 13.8 % (ref 11.6–15.1)
GFR SERPL CREATININE-BSD FRML MDRD: 117 ML/MIN/1.73SQ M
GLUCOSE SERPL-MCNC: 137 MG/DL (ref 65–140)
HCT VFR BLD AUTO: 41.6 % (ref 34.8–46.1)
HGB BLD-MCNC: 13.7 G/DL (ref 11.5–15.4)
INR PPP: 1.27 (ref 0.86–1.17)
LACTATE SERPL-SCNC: 2.4 MMOL/L (ref 0.5–2)
LACTATE SERPL-SCNC: 2.7 MMOL/L (ref 0.5–2)
LACTATE SERPL-SCNC: 3.1 MMOL/L (ref 0.5–2)
LACTATE SERPL-SCNC: 3.2 MMOL/L (ref 0.5–2)
LACTATE SERPL-SCNC: 3.8 MMOL/L (ref 0.5–2)
LACTATE SERPL-SCNC: 4.4 MMOL/L (ref 0.5–2)
LIPASE SERPL-CCNC: 106 U/L (ref 73–393)
MAGNESIUM SERPL-MCNC: 1.4 MG/DL (ref 1.6–2.6)
MCH RBC QN AUTO: 33.4 PG (ref 26.8–34.3)
MCHC RBC AUTO-ENTMCNC: 32.9 G/DL (ref 31.4–37.4)
MCV RBC AUTO: 102 FL (ref 82–98)
PHENOBARB SERPL-MCNC: 17.3 UG/ML (ref 15–40)
PHOSPHATE SERPL-MCNC: 2.9 MG/DL (ref 2.7–4.5)
PLATELET # BLD AUTO: 188 THOUSANDS/UL (ref 149–390)
PMV BLD AUTO: 11.7 FL (ref 8.9–12.7)
POTASSIUM SERPL-SCNC: 3.8 MMOL/L (ref 3.5–5.3)
PROCALCITONIN SERPL-MCNC: 0.59 NG/ML
PROT SERPL-MCNC: 6 G/DL (ref 6.4–8.2)
PROTHROMBIN TIME: 15.3 SECONDS (ref 11.8–14.2)
RBC # BLD AUTO: 4.1 MILLION/UL (ref 3.81–5.12)
SODIUM SERPL-SCNC: 139 MMOL/L (ref 136–145)
WBC # BLD AUTO: 20.07 THOUSAND/UL (ref 4.31–10.16)

## 2018-09-07 PROCEDURE — 85730 THROMBOPLASTIN TIME PARTIAL: CPT | Performed by: PHYSICIAN ASSISTANT

## 2018-09-07 PROCEDURE — 74177 CT ABD & PELVIS W/CONTRAST: CPT

## 2018-09-07 PROCEDURE — 82150 ASSAY OF AMYLASE: CPT | Performed by: STUDENT IN AN ORGANIZED HEALTH CARE EDUCATION/TRAINING PROGRAM

## 2018-09-07 PROCEDURE — 36415 COLL VENOUS BLD VENIPUNCTURE: CPT | Performed by: EMERGENCY MEDICINE

## 2018-09-07 PROCEDURE — 85610 PROTHROMBIN TIME: CPT | Performed by: PHYSICIAN ASSISTANT

## 2018-09-07 PROCEDURE — 99285 EMERGENCY DEPT VISIT HI MDM: CPT

## 2018-09-07 PROCEDURE — 99222 1ST HOSP IP/OBS MODERATE 55: CPT | Performed by: PHYSICIAN ASSISTANT

## 2018-09-07 PROCEDURE — 84145 PROCALCITONIN (PCT): CPT | Performed by: PHYSICIAN ASSISTANT

## 2018-09-07 PROCEDURE — 71260 CT THORAX DX C+: CPT

## 2018-09-07 PROCEDURE — 83735 ASSAY OF MAGNESIUM: CPT | Performed by: PHYSICIAN ASSISTANT

## 2018-09-07 PROCEDURE — 83605 ASSAY OF LACTIC ACID: CPT | Performed by: PHYSICIAN ASSISTANT

## 2018-09-07 PROCEDURE — 87081 CULTURE SCREEN ONLY: CPT | Performed by: FAMILY MEDICINE

## 2018-09-07 PROCEDURE — 85027 COMPLETE CBC AUTOMATED: CPT | Performed by: PHYSICIAN ASSISTANT

## 2018-09-07 PROCEDURE — 84100 ASSAY OF PHOSPHORUS: CPT | Performed by: PHYSICIAN ASSISTANT

## 2018-09-07 PROCEDURE — 87147 CULTURE TYPE IMMUNOLOGIC: CPT | Performed by: FAMILY MEDICINE

## 2018-09-07 PROCEDURE — 80053 COMPREHEN METABOLIC PANEL: CPT | Performed by: PHYSICIAN ASSISTANT

## 2018-09-07 PROCEDURE — 70450 CT HEAD/BRAIN W/O DYE: CPT

## 2018-09-07 PROCEDURE — 83690 ASSAY OF LIPASE: CPT | Performed by: STUDENT IN AN ORGANIZED HEALTH CARE EDUCATION/TRAINING PROGRAM

## 2018-09-07 PROCEDURE — 83605 ASSAY OF LACTIC ACID: CPT | Performed by: INTERNAL MEDICINE

## 2018-09-07 PROCEDURE — 87040 BLOOD CULTURE FOR BACTERIA: CPT | Performed by: EMERGENCY MEDICINE

## 2018-09-07 RX ORDER — ACETAMINOPHEN 650 MG/1
650 SUPPOSITORY RECTAL EVERY 4 HOURS PRN
Status: DISCONTINUED | OUTPATIENT
Start: 2018-09-07 | End: 2018-09-12 | Stop reason: HOSPADM

## 2018-09-07 RX ORDER — RUFINAMIDE 400 MG/1
1200 TABLET, FILM COATED ORAL 2 TIMES DAILY
Status: DISCONTINUED | OUTPATIENT
Start: 2018-09-07 | End: 2018-09-07

## 2018-09-07 RX ORDER — RUFINAMIDE 200 MG/1
1200 TABLET, FILM COATED ORAL 2 TIMES DAILY
Status: DISCONTINUED | OUTPATIENT
Start: 2018-09-07 | End: 2018-09-12 | Stop reason: HOSPADM

## 2018-09-07 RX ORDER — ACETAMINOPHEN 650 MG/1
325 SUPPOSITORY RECTAL EVERY 4 HOURS PRN
Status: DISCONTINUED | OUTPATIENT
Start: 2018-09-07 | End: 2018-09-07

## 2018-09-07 RX ORDER — AMOXICILLIN 250 MG
1 CAPSULE ORAL DAILY
Status: DISCONTINUED | OUTPATIENT
Start: 2018-09-07 | End: 2018-09-12 | Stop reason: HOSPADM

## 2018-09-07 RX ORDER — LACTULOSE 20 G/30ML
30 SOLUTION ORAL 3 TIMES DAILY
Status: DISCONTINUED | OUTPATIENT
Start: 2018-09-07 | End: 2018-09-12 | Stop reason: HOSPADM

## 2018-09-07 RX ORDER — LEVOFLOXACIN 5 MG/ML
750 INJECTION, SOLUTION INTRAVENOUS EVERY 24 HOURS
Status: DISCONTINUED | OUTPATIENT
Start: 2018-09-07 | End: 2018-09-10

## 2018-09-07 RX ORDER — ONDANSETRON 2 MG/ML
4 INJECTION INTRAMUSCULAR; INTRAVENOUS EVERY 6 HOURS PRN
Status: DISCONTINUED | OUTPATIENT
Start: 2018-09-07 | End: 2018-09-12 | Stop reason: HOSPADM

## 2018-09-07 RX ORDER — SACCHAROMYCES BOULARDII 250 MG
250 CAPSULE ORAL DAILY
Status: DISCONTINUED | OUTPATIENT
Start: 2018-09-07 | End: 2018-09-12 | Stop reason: HOSPADM

## 2018-09-07 RX ORDER — MAGNESIUM SULFATE HEPTAHYDRATE 40 MG/ML
2 INJECTION, SOLUTION INTRAVENOUS ONCE
Status: COMPLETED | OUTPATIENT
Start: 2018-09-07 | End: 2018-09-07

## 2018-09-07 RX ORDER — ACETAMINOPHEN 650 MG/1
650 SUPPOSITORY RECTAL ONCE
Status: COMPLETED | OUTPATIENT
Start: 2018-09-07 | End: 2018-09-07

## 2018-09-07 RX ORDER — LEVOCARNITINE 1 G/10ML
750 SOLUTION ORAL 3 TIMES DAILY
Status: DISCONTINUED | OUTPATIENT
Start: 2018-09-07 | End: 2018-09-07

## 2018-09-07 RX ORDER — PHENOBARBITAL 20 MG/5ML
60 ELIXIR ORAL
Status: DISCONTINUED | OUTPATIENT
Start: 2018-09-07 | End: 2018-09-07

## 2018-09-07 RX ORDER — ACETAMINOPHEN 325 MG/1
650 TABLET ORAL EVERY 6 HOURS PRN
Status: DISCONTINUED | OUTPATIENT
Start: 2018-09-07 | End: 2018-09-07

## 2018-09-07 RX ORDER — VALPROIC ACID 250 MG/5ML
250 SOLUTION ORAL EVERY 8 HOURS
Status: DISCONTINUED | OUTPATIENT
Start: 2018-09-07 | End: 2018-09-12 | Stop reason: HOSPADM

## 2018-09-07 RX ORDER — LANOLIN ALCOHOL/MO/W.PET/CERES
6 CREAM (GRAM) TOPICAL
Status: DISCONTINUED | OUTPATIENT
Start: 2018-09-07 | End: 2018-09-12 | Stop reason: HOSPADM

## 2018-09-07 RX ORDER — CIPROFLOXACIN 2 MG/ML
400 INJECTION, SOLUTION INTRAVENOUS EVERY 8 HOURS
Status: DISCONTINUED | OUTPATIENT
Start: 2018-09-07 | End: 2018-09-07

## 2018-09-07 RX ORDER — PHENOBARBITAL 20 MG/5ML
60 ELIXIR ORAL
Status: DISCONTINUED | OUTPATIENT
Start: 2018-09-07 | End: 2018-09-08

## 2018-09-07 RX ORDER — SODIUM CHLORIDE 9 MG/ML
150 INJECTION, SOLUTION INTRAVENOUS CONTINUOUS
Status: DISCONTINUED | OUTPATIENT
Start: 2018-09-07 | End: 2018-09-08

## 2018-09-07 RX ORDER — TOPIRAMATE 100 MG/1
200 TABLET, FILM COATED ORAL EVERY 12 HOURS SCHEDULED
Status: DISCONTINUED | OUTPATIENT
Start: 2018-09-07 | End: 2018-09-07

## 2018-09-07 RX ORDER — LORAZEPAM 2 MG/ML
1 INJECTION INTRAMUSCULAR EVERY 4 HOURS PRN
Status: DISCONTINUED | OUTPATIENT
Start: 2018-09-07 | End: 2018-09-12 | Stop reason: HOSPADM

## 2018-09-07 RX ORDER — LEVETIRACETAM 100 MG/ML
1000 SOLUTION ORAL EVERY 12 HOURS SCHEDULED
Status: DISCONTINUED | OUTPATIENT
Start: 2018-09-07 | End: 2018-09-12 | Stop reason: HOSPADM

## 2018-09-07 RX ORDER — POTASSIUM CHLORIDE 20MEQ/15ML
20 LIQUID (ML) ORAL DAILY
Status: DISCONTINUED | OUTPATIENT
Start: 2018-09-07 | End: 2018-09-12 | Stop reason: HOSPADM

## 2018-09-07 RX ORDER — TOPIRAMATE 25 MG/1
50 TABLET ORAL DAILY
Status: DISCONTINUED | OUTPATIENT
Start: 2018-09-07 | End: 2018-09-08

## 2018-09-07 RX ORDER — LEVOCARNITINE 1 G/10ML
750 SOLUTION ORAL 3 TIMES DAILY
Status: DISCONTINUED | OUTPATIENT
Start: 2018-09-07 | End: 2018-09-12 | Stop reason: HOSPADM

## 2018-09-07 RX ORDER — TOPIRAMATE 25 MG/1
50 TABLET ORAL DAILY
Status: DISCONTINUED | OUTPATIENT
Start: 2018-09-07 | End: 2018-09-07

## 2018-09-07 RX ORDER — MAGNESIUM SULFATE 1 G/100ML
1 INJECTION INTRAVENOUS ONCE
Status: COMPLETED | OUTPATIENT
Start: 2018-09-07 | End: 2018-09-07

## 2018-09-07 RX ADMIN — VALPROIC ACID 250 MG: 500 SOLUTION ORAL at 10:04

## 2018-09-07 RX ADMIN — TOPIRAMATE 50 MG: 25 TABLET, FILM COATED ORAL at 08:26

## 2018-09-07 RX ADMIN — LEVOFLOXACIN 750 MG: 5 INJECTION, SOLUTION INTRAVENOUS at 14:50

## 2018-09-07 RX ADMIN — PHENOBARBITAL 60 MG: 20 LIQUID ORAL at 22:47

## 2018-09-07 RX ADMIN — METRONIDAZOLE 500 MG: 500 INJECTION, SOLUTION INTRAVENOUS at 21:03

## 2018-09-07 RX ADMIN — CIPROFLOXACIN 400 MG: 2 INJECTION, SOLUTION INTRAVENOUS at 06:30

## 2018-09-07 RX ADMIN — SODIUM CHLORIDE 1000 ML: 0.9 INJECTION, SOLUTION INTRAVENOUS at 01:00

## 2018-09-07 RX ADMIN — MAGNESIUM SULFATE HEPTAHYDRATE 2 G: 40 INJECTION, SOLUTION INTRAVENOUS at 10:15

## 2018-09-07 RX ADMIN — SODIUM CHLORIDE 1000 ML: 0.9 INJECTION, SOLUTION INTRAVENOUS at 08:06

## 2018-09-07 RX ADMIN — VANCOMYCIN HYDROCHLORIDE 750 MG: 500 INJECTION, POWDER, LYOPHILIZED, FOR SOLUTION INTRAVENOUS at 21:20

## 2018-09-07 RX ADMIN — METRONIDAZOLE 500 MG: 500 INJECTION, SOLUTION INTRAVENOUS at 14:47

## 2018-09-07 RX ADMIN — LACTULOSE 30 G: 10 SOLUTION ORAL at 08:26

## 2018-09-07 RX ADMIN — LEVETIRACETAM 1000 MG: 100 SOLUTION ORAL at 21:05

## 2018-09-07 RX ADMIN — LACTULOSE 30 G: 10 SOLUTION ORAL at 17:05

## 2018-09-07 RX ADMIN — SODIUM CHLORIDE 125 ML/HR: 0.9 INJECTION, SOLUTION INTRAVENOUS at 21:24

## 2018-09-07 RX ADMIN — LEVOCARNITINE 750 MG: 1 SOLUTION ORAL at 10:03

## 2018-09-07 RX ADMIN — POTASSIUM CHLORIDE 20 MEQ: 20 SOLUTION ORAL at 08:26

## 2018-09-07 RX ADMIN — SODIUM CHLORIDE 100 ML/HR: 0.9 INJECTION, SOLUTION INTRAVENOUS at 03:18

## 2018-09-07 RX ADMIN — MAGNESIUM SULFATE HEPTAHYDRATE 1 G: 1 INJECTION, SOLUTION INTRAVENOUS at 05:49

## 2018-09-07 RX ADMIN — VANCOMYCIN HYDROCHLORIDE 750 MG: 500 INJECTION, POWDER, LYOPHILIZED, FOR SOLUTION INTRAVENOUS at 09:15

## 2018-09-07 RX ADMIN — Medication 250 MG: at 10:04

## 2018-09-07 RX ADMIN — IOHEXOL 90 ML: 350 INJECTION, SOLUTION INTRAVENOUS at 11:22

## 2018-09-07 RX ADMIN — RUFINAMIDE 1200 MG: 200 TABLET, FILM COATED ORAL at 10:45

## 2018-09-07 RX ADMIN — ACETAMINOPHEN 650 MG: 650 SUPPOSITORY RECTAL at 06:36

## 2018-09-07 RX ADMIN — LEVOCARNITINE 750 MG: 1 SOLUTION ORAL at 21:05

## 2018-09-07 RX ADMIN — MELATONIN TAB 3 MG 6 MG: 3 TAB at 22:47

## 2018-09-07 RX ADMIN — LEVOCARNITINE 750 MG: 1 SOLUTION ORAL at 17:05

## 2018-09-07 RX ADMIN — MULTIPLE VITAMINS W/ MINERALS TAB 1 TABLET: TAB at 08:26

## 2018-09-07 RX ADMIN — Medication 1 TABLET: at 08:26

## 2018-09-07 RX ADMIN — ACETAMINOPHEN 650 MG: 650 SUPPOSITORY RECTAL at 01:27

## 2018-09-07 RX ADMIN — LEVETIRACETAM 1000 MG: 100 SOLUTION ORAL at 10:03

## 2018-09-07 RX ADMIN — VALPROIC ACID 250 MG: 500 SOLUTION ORAL at 18:11

## 2018-09-07 RX ADMIN — RUFINAMIDE 1200 MG: 200 TABLET, FILM COATED ORAL at 18:12

## 2018-09-07 RX ADMIN — LACTULOSE 30 G: 10 SOLUTION ORAL at 21:05

## 2018-09-07 NOTE — ED PROVIDER NOTES
Pt Name: Amando Hannah  MRN: [id number]  [place] 1980  Age/Sex: 41 y o  female  Date of evaluation: 9/6/2018  PCP: Nik Braga, [address] [place] [address]    Chief Complaint   Patient presents with    Altered Mental Status     Patient was found in her room to be vomiting  Nursing home states that she vomited one time  HPI    Nelson Mckenzie presents to the Emergency Department after she had a possible seizure and concern for aspiration  She was blue for a while and then seemed to be having a hard time breathing  She was on NC O2 on arrival here and was in the 90s/ pulse ox  HPI      Past Medical and Surgical History    Past Medical History:   Diagnosis Date    ADHD     Anoxic brain damage (HCC)     Autistic disorder     Hyperammonemia (HCC)     Hyperkeratosis     Hypotension     Intellectual disability     Intellectual disability     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with tonic seizures (HCC)     Lethargy     Liver enzyme elevation     Onychomycosis     Osteoporosis     Osteoporosis     Psychiatric disorder     anxiety       Past Surgical History:   Procedure Laterality Date    ABDOMINAL SURGERY      CARDIAC PACEMAKER PLACEMENT      JEJUNOSTOMY FEEDING TUBE      PEG TUBE PLACEMENT         History reviewed  No pertinent family history  Social History   Substance Use Topics    Smoking status: Never Smoker    Smokeless tobacco: Never Used    Alcohol use No              Allergies    Allergies   Allergen Reactions    Felbamate        Home Medications    Prior to Admission medications    Medication Sig Start Date End Date Taking?  Authorizing Provider   acetaminophen (TYLENOL) 325 mg tablet 650 mg by Per G Tube route every 6 (six) hours as needed for mild pain    Historical Provider, MD   bisacodyl (FLEET) 10 MG/30ML ENEM Insert 10 mg into the rectum as needed for constipation    Historical Provider, MD   lactulose 20 g/30 mL 45 mL (30 g total) by Per G Tube route 3 (three) times a day 8/24/18   Gregory Clark MD   levETIRAcetam (KEPPRA) 100 mg/mL oral solution 10 mL (1,000 mg total) by Per G Tube route every 12 (twelve) hours for 30 days 9/4/18 10/4/18  Mary Carvalho MD   levOCARNitine (CARNITOR) 1 g/10 mL solution 7 5 mL (750 mg total) by Per G Tube route 3 (three) times a day 9/4/18   Mary Carvalho MD   magnesium hydroxide (MILK OF MAGNESIA) 400 mg/5 mL oral suspension Take 30 mL by mouth daily as needed for constipation    Historical Provider, MD   MELATONIN PO 6 mg by Per G Tube route daily at bedtime    Historical Provider, MD   Multiple Vitamins-Minerals (CENTRUM SILVER PO) 1 tablet by Per G Tube route daily    Historical Provider, MD   ondansetron (ZOFRAN) 4 mg tablet 4 mg by Per G Tube route every 8 (eight) hours as needed for nausea or vomiting      Historical Provider, MD queenl [facility]) oral suspension 16 mL (8 mg total) by Per NG Tube route daily at bedtime Per G tube 9/4/18   Mary Carvalho MD   PHENobarbital 20 mg/5 mL elixir Take 15 mL (60 mg total) by mouth daily at bedtime 9/4/18   Mary Carvalho MD   potassium chloride 10 % 20 mEq by Per G Tube route daily      Historical Provider, MD   Probiotic Product (ALEXANDER-BID PROBIOTIC PO) 1 tablet by Per G Tube route daily      Historical Provider, MD   rufinamide (BANZEL) 400 mg tablet 3 tablets (1,200 mg total) by Per G Tube route 2 (two) times a day 9/4/18   Mary Carvalho MD   Sennosides-Docusate Sodium (SENEXON-S PO) 1 tablet by Per G Tube route daily      Historical Provider, MD   topiramate (TOPAMAX) 200 MG tablet Take 1 tablet (200 mg total) by mouth every 12 (twelve) hours 9/4/18   Mary Carvalho MD   topiramate (TOPAMAX) 50 MG tablet Discontinue Topiramate 50mg tabs 9/4/18   Mary Carvalho MD   valproic acid (DEPAKENE) 250 MG/5ML soln 5 mL (250 mg total) by Per G Tube route every 8 (eight) hours 9/4/18   Mary Carvalho MD           Review of Systems    Review of Systems   Constitutional: Negative for activity change, appetite change, chills, diaphoresis, fatigue and fever  HENT: Negative for congestion, postnasal drip, rhinorrhea, sinus pressure, sneezing and sore throat  Eyes: Negative for pain and visual disturbance  Respiratory: Negative for cough, chest tightness and shortness of breath  Cardiovascular: Negative for chest pain, palpitations and leg swelling  Gastrointestinal: Negative for abdominal distention, abdominal pain, constipation, diarrhea, nausea and vomiting  Endocrine: Negative for polydipsia, polyphagia and polyuria  Genitourinary: Negative for decreased urine volume, difficulty urinating, dysuria, flank pain, frequency and hematuria  Musculoskeletal: Positive for back pain  Negative for arthralgias, gait problem, joint swelling and neck pain  Skin: Negative for pallor and rash  Allergic/Immunologic: Negative for immunocompromised state  Neurological: Negative for syncope, speech difficulty, weakness, light-headedness, numbness and headaches  All other systems reviewed and are negative  Physical Exam      ED Triage Vitals   Temperature Pulse Respirations Blood Pressure SpO2   09/06/18 2057 09/06/18 2057 09/06/18 2057 09/06/18 2057 09/06/18 2057   97 8 °F (36 6 °C) 89 18 117/63 95 %      Temp Source Heart Rate Source Patient Position - Orthostatic VS BP Location FiO2 (%)   09/06/18 2057 09/06/18 2057 09/06/18 2300 09/06/18 2300 --   Temporal Monitor Lying Left arm       Pain Score       09/06/18 2057       No Pain               Physical Exam   Constitutional: She appears lethargic  No distress  HENT:   Head: Normocephalic and atraumatic  Nose: Nose normal    Mouth/Throat: Oropharynx is clear and moist    Eyes: Conjunctivae, EOM and lids are normal  Pupils are equal, round, and reactive to light  Neck: Normal range of motion  Neck supple  Cardiovascular: Normal rate, regular rhythm and normal heart sounds  Exam reveals no gallop and no friction rub      No murmur heard  Pulmonary/Chest: No accessory muscle usage  No respiratory distress  She has decreased breath sounds  She has no wheezes  She has rhonchi  She has no rales  Abdominal: Soft  She exhibits no distension  There is no tenderness  There is no rebound and no guarding  Neurological: She appears lethargic  Skin: Skin is warm and dry  No rash noted  She is not diaphoretic  No erythema  Psychiatric: She has a normal mood and affect  Her speech is normal and behavior is normal  Judgment and thought content normal    Nursing note and vitals reviewed        Assessment and Plan        MDM    Diagnostic Results      Labs:    Results for orders placed or performed during the hospital encounter of 09/06/18   Valproic acid level, total   Result Value Ref Range    Valproic Acid, Total 72 50 - 100 ug/mL   CBC and differential   Result Value Ref Range    WBC 5 94 4 31 - 10 16 Thousand/uL    RBC 4 27 3 81 - 5 12 Million/uL    Hemoglobin 14 3 11 5 - 15 4 g/dL    Hematocrit 44 4 34 8 - 46 1 %     (H) 82 - 98 fL    MCH 33 5 26 8 - 34 3 pg    MCHC 32 2 31 4 - 37 4 g/dL    RDW 14 0 11 6 - 15 1 %    MPV 12 4 8 9 - 12 7 fL    Platelets 347 942 - 460 Thousands/uL    nRBC 0 /100 WBCs    Neutrophils Relative 50 43 - 75 %    Immat GRANS % 1 0 - 2 %    Lymphocytes Relative 34 14 - 44 %    Monocytes Relative 9 4 - 12 %    Eosinophils Relative 5 0 - 6 %    Basophils Relative 1 0 - 1 %    Neutrophils Absolute 3 03 1 85 - 7 62 Thousands/µL    Immature Grans Absolute 0 03 0 00 - 0 20 Thousand/uL    Lymphocytes Absolute 1 99 0 60 - 4 47 Thousands/µL    Monocytes Absolute 0 56 0 17 - 1 22 Thousand/µL    Eosinophils Absolute 0 27 0 00 - 0 61 Thousand/µL    Basophils Absolute 0 06 0 00 - 0 10 Thousands/µL   Comprehensive metabolic panel   Result Value Ref Range    Sodium 135 (L) 136 - 145 mmol/L    Potassium 4 5 3 5 - 5 3 mmol/L    Chloride 103 100 - 108 mmol/L    CO2 20 (L) 21 - 32 mmol/L    ANION GAP 12 4 - 13 mmol/L    BUN 9 5 - 25 mg/dL    Creatinine 0 60 0 60 - 1 30 mg/dL    Glucose 194 (H) 65 - 140 mg/dL    Calcium 8 2 (L) 8 3 - 10 1 mg/dL     (H) 5 - 45 U/L     (H) 12 - 78 U/L    Alkaline Phosphatase 133 (H) 46 - 116 U/L    Total Protein 6 9 6 4 - 8 2 g/dL    Albumin 2 5 (L) 3 5 - 5 0 g/dL    Total Bilirubin 0 40 0 20 - 1 00 mg/dL    eGFR 117 ml/min/1 73sq m   Lactic acid, plasma   Result Value Ref Range    LACTIC ACID 3 3 (HH) 0 5 - 2 0 mmol/L   Blood gas, arterial   Result Value Ref Range    pH, Arterial 7 296 (L) 7 350 - 7 450    pCO2, Arterial 42 9 36 0 - 44 0 mm Hg    pO2, Arterial 111 5 75 0 - 129 0 mm Hg    HCO3, Arterial 20 4 (L) 22 0 - 28 0 mmol/L    Base Excess, Arterial -5 8 mmol/L    O2 Content, Arterial 20 4 16 0 - 23 0 mL/dL    O2 HGB,Arterial  97 3 (H) 94 0 - 97 0 %    REZA TEST Yes     Nasal Cannula 6 lpm via nasal cannula    Fingerstick Glucose (POCT)   Result Value Ref Range    POC Glucose 196 (H) 65 - 140 mg/dl       All labs reviewed and utilized in the medical decision making process    Radiology:    XR chest 1 view portable    (Results Pending)       All radiology studies independently viewed by me and interpreted by the radiologist     Procedure    Procedures    CritCare Time      ED Course of Care and Re-Assessments      Medications   lactulose 20 g/30 mL oral solution 30 g (not administered)   levETIRAcetam (KEPPRA) oral solution 1,000 mg (not administered)   levOCARNitine (CARNITOR) oral solution 750 mg (not administered)   magnesium hydroxide (MILK OF MAGNESIA) 400 mg/5 mL oral suspension 30 mL (not administered)   melatonin tablet 6 mg (not administered)   multivitamin-minerals (CENTRUM) tablet 1 tablet (not administered)   perampanel (FYCOMPA) oral suspension 8 mg (not administered)   PHENobarbital oral elixir 60 mg (not administered)   potassium chloride 10 % oral solution 20 mEq (not administered)   saccharomyces boulardii (FLORASTOR) capsule 250 mg (not administered)   rufinamide (BANZEL) tablet 1,200 mg (not administered)   senna-docusate sodium (SENOKOT S) 8 6-50 mg per tablet 1 tablet (not administered)   valproic acid (DEPAKENE) 250 MG/5ML oral soln 250 mg (not administered)   topiramate (TOPAMAX) tablet 50 mg (not administered)   sodium chloride 0 9 % infusion (not administered)   ondansetron (ZOFRAN) injection 4 mg (not administered)   acetaminophen (TYLENOL) tablet 650 mg (not administered)   sodium chloride 0 9 % bolus 1,000 mL (1,000 mL Intravenous New Bag 9/7/18 0100)   acetaminophen (TYLENOL) rectal suppository 650 mg (650 mg Rectal Given 9/7/18 0127)           FINAL IMPRESSION    Final diagnoses:   Seizure ([address] )         DISPOSITION/PLAN      Time reflects when diagnosis was documented in both MDM as applicable and the Disposition within this note     Time User Action Codes Description Comment    9/7/2018 12:45 AM Daniel Burns [[id number]] Seizure [facility])       ED Disposition     ED Disposition Condition Comment    Admit  Case was discussed with FERNANDO and the patient's admission status was agreed to be Admission Status: observation status to the service of Dr Jose Antonio Park   Follow-up Information    None           PATIENT REFERRED TO:    No follow-up provider specified      DISCHARGE MEDICATIONS:    Current Discharge Medication List      CONTINUE these medications which have NOT CHANGED    Details   acetaminophen (TYLENOL) 325 mg tablet 650 mg by Per G Tube route every 6 (six) hours as needed for mild pain      bisacodyl (FLEET) 10 MG/30ML ENEM Insert 10 mg into the rectum as needed for constipation      lactulose 20 g/30 mL 45 mL (30 g total) by Per G Tube route 3 (three) times a day  Refills: 0    Associated Diagnoses: Acute encephalopathy; Other generalized epilepsy, intractable, without status epilepticus (HCC)      levETIRAcetam (KEPPRA) 100 mg/mL oral solution 10 mL (1,000 mg total) by Per G Tube route every 12 (twelve) hours for 30 days  Qty: 600 mL, Refills: 5    Associated Diagnoses: 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with tonic seizures (HCC)      levOCARNitine (CARNITOR) 1 g/10 mL solution 7 5 mL (750 mg total) by Per G Tube route 3 (three) times a day  Qty: 474 mL, Refills: 7    Associated Diagnoses: 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with tonic seizures (HCC)      magnesium hydroxide (MILK OF MAGNESIA) 400 mg/5 mL oral suspension Take 30 mL by mouth daily as needed for constipation      MELATONIN PO 6 mg by Per G Tube route daily at bedtime      Multiple Vitamins-Minerals (CENTRUM SILVER PO) 1 tablet by Per G Tube route daily      ondansetron (ZOFRAN) 4 mg tablet 4 mg by Per G Tube route every 8 (eight) hours as needed for nausea or vomiting        perampanel (FYCOMPA) oral suspension 16 mL (8 mg total) by Per NG Tube route daily at bedtime Per G tube  Qty: 480 mL, Refills: 5    Associated Diagnoses: 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with tonic seizures (HCC)      PHENobarbital 20 mg/5 mL elixir Take 15 mL (60 mg total) by mouth daily at bedtime  Qty: 473 mL, Refills: 5    Associated Diagnoses: 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with tonic seizures (HCC)      potassium chloride 10 % 20 mEq by Per G Tube route daily        Probiotic Product (ALEXANDER-BID PROBIOTIC PO) 1 tablet by Per G Tube route daily        rufinamide (BANZEL) 400 mg tablet 3 tablets (1,200 mg total) by Per G Tube route 2 (two) times a day  Qty: 180 tablet, Refills: 5    Associated Diagnoses: 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with tonic seizures (HCC)      Sennosides-Docusate Sodium (SENEXON-S PO) 1 tablet by Per G Tube route daily        !! topiramate (TOPAMAX) 200 MG tablet Take 1 tablet (200 mg total) by mouth every 12 (twelve) hours  Qty: 60 tablet, Refills: 5    Associated Diagnoses: 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with tonic seizures (HCC)      ! ! topiramate (TOPAMAX) 50 MG tablet Discontinue Topiramate 50mg tabs  Qty: 1 tablet, Refills: 0    Associated Diagnoses: 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with tonic seizures (HCC)      valproic acid (DEPAKENE) 250 MG/5ML soln 5 mL (250 mg total) by Per G Tube route every 8 (eight) hours  Qty: 450 mL, Refills: 5    Associated Diagnoses: 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with tonic seizures ([facility] [address] )       ! ! - Potential duplicate medications found  Please discuss with provider  No discharge procedures on file           Aroldo Ferris, DO Aroldo Ferris,   09/07/18 [address] [place] [address] ,   09/07/18 1220

## 2018-09-07 NOTE — H&P
[facility] Internal Medicine  H&P- Tamiko Richie 1980, 41 y o  female MRN: [id number]    Unit/Bed#: 426-01 Encounter: [id number]    Primary Care Provider: Modesto Brittle, DO   Date and time admitted to hospital: 9/6/2018  8:46 PM        * Sepsis due to undetermined organism [facility])   Assessment & Plan    -Present on admission  -No clear etiology, Possible UTI vs Pneumonia   -leukocytosis, tachycardia, lactic acidosis, fever  -Chest X-ray completed official report pending  -Admit to med surg  -Telemetry monitoring  -IV normal saline  -IV vancomycin, Cipro  -Cultures pending  -UA pending  -Check procalcitonin  -AM labs  -Supportive care        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with tonic seizures ([facility] [place] [address] )   Assessment & Plan    -Reports possible seizure while at SNF  -Had one episode of vomiting  -No additional episodes in ER  -Seizure precautions  -Check medication levels  -ativan PRN  -Continue home medication  -Supportive care        Transaminitis   Assessment & Plan    -Appears to be trending down  -Liver US on 8/15/18 unremarkable  -Seen By GI on 8/20/2018  -Recommended change in seizure medication  -Will continue to monitor   -GI consult if no Improvement        Dysphagia   Assessment & Plan    -Intact PEG tube  -Continue peg tube feedings  -Nutrition consult                  VTE Prophylaxis: Enoxaparin (Lovenox)  / sequential compression device   Code Status: Level 1- Full code  POLST: POLST form is not discussed and not completed at this time  Discussion with family: None    Anticipated Length of Stay:  Patient will be admitted on an Observation basis with an anticipated length of stay of  > 2 midnights  Justification for Hospital Stay: Sepsis due to unknown organism    Total Time for Visit, including Counseling / Coordination of Care: 30 minutes  Greater than 50% of this total time spent on direct patient counseling and coordination of care      Chief Complaint:   Altered mental status    History of Present Illness:    Tamiko Quiroz is a 41 y o  female who presents to ER from SNF with complaint of changes in mental status, possible seizure at [facility]  As per NH staff  she had episode of vomiting in her room and appeared to have trouble breathing with concern for aspiration  Patient is nonverbal at baseline with hx of Anoxic brain damage, seizures,Autistic disorder  No seizure activity noted in ER  Labs completed in ER with results as shown below  Chest X-ray completed official report pending  Review of Systems:    Review of Systems   Unable to perform ROS: Patient nonverbal       Past Medical and Surgical History:     Past Medical History:   Diagnosis Date    ADHD     Anoxic brain damage ([facility] [place] [address] )     Autistic disorder     Hyperammonemia (HCC)     Hyperkeratosis     Hypotension     Intellectual disability     Intellectual disability     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with tonic seizures (HCC)     Lethargy     Liver enzyme elevation     Onychomycosis     Osteoporosis     Osteoporosis     Psychiatric disorder     anxiety       Past Surgical History:   Procedure Laterality Date    ABDOMINAL SURGERY      CARDIAC PACEMAKER PLACEMENT      JEJUNOSTOMY FEEDING TUBE      PEG TUBE PLACEMENT         Meds/Allergies:    Prior to Admission medications    Medication Sig Start Date End Date Taking?  Authorizing Provider   acetaminophen (TYLENOL) 325 mg tablet 650 mg by Per G Tube route every 6 (six) hours as needed for mild pain    Historical Provider, MD   bisacodyl (FLEET) 10 MG/30ML ENEM Insert 10 mg into the rectum as needed for constipation    Historical Provider, MD   lactulose 20 g/30 mL 45 mL (30 g total) by Per G Tube route 3 (three) times a day 8/24/18   Iris Moraes MD   levETIRAcetam (KEPPRA) 100 mg/mL oral solution 10 mL (1,000 mg total) by Per G Tube route every 12 (twelve) hours for 30 days 9/4/18 10/4/18  Hilary France MD   levOCARNitine (CARNITOR) 1 g/10 mL solution 7 5 mL (750 mg total) by Per G Tube route 3 (three) times a day 9/4/18   Deloris Wilkinson MD   magnesium hydroxide (MILK OF MAGNESIA) 400 mg/5 mL oral suspension Take 30 mL by mouth daily as needed for constipation    Historical Provider, MD   MELATONIN PO 6 mg by Per G Tube route daily at bedtime    Historical Provider, MD   Multiple Vitamins-Minerals (CENTRUM SILVER PO) 1 tablet by Per G Tube route daily    Historical Provider, MD   ondansetron (ZOFRAN) 4 mg tablet 4 mg by Per G Tube route every 8 (eight) hours as needed for nausea or vomiting      Historical Provider, MD   perampanel [facility]) oral suspension 16 mL (8 mg total) by Per NG Tube route daily at bedtime Per G tube 9/4/18   Deloris Wilkinson MD   PHENobarbital 20 mg/5 mL elixir Take 15 mL (60 mg total) by mouth daily at bedtime 9/4/18   Deloris Wilkinson MD   potassium chloride 10 % 20 mEq by Per G Tube route daily      Historical Provider, MD   Probiotic Product (ALEXANDER-BID PROBIOTIC PO) 1 tablet by Per G Tube route daily      Historical Provider, MD   rufinamide (BANZEL) 400 mg tablet 3 tablets (1,200 mg total) by Per G Tube route 2 (two) times a day 9/4/18   Deloris Wilkinson MD   Sennosides-Docusate Sodium (SENEXON-S PO) 1 tablet by Per G Tube route daily      Historical Provider, MD   topiramate (TOPAMAX) 200 MG tablet Take 1 tablet (200 mg total) by mouth every 12 (twelve) hours 9/4/18   Deloris Wilkinson MD   topiramate (TOPAMAX) 50 MG tablet Discontinue Topiramate 50mg tabs 9/4/18   Deloris Wilkinson MD   valproic acid (DEPAKENE) 250 MG/5ML soln 5 mL (250 mg total) by Per G Tube route every 8 (eight) hours 9/4/18   Deloris Wilkinson MD     I have reveiwed home medications using records provided by [facility]  Allergies:    Allergies   Allergen Reactions    Felbamate        Social History:     Marital Status: Single   Occupation: Disabled  Patient Pre-hospital Living Situation: [facility]  Patient Pre-hospital Level of Mobility:   Patient Pre-hospital Diet Restrictions: Peg tube feedings  Substance Use History:   History   Alcohol Use No     History   Smoking Status    Never Smoker   Smokeless Tobacco    Never Used     History   Drug Use No       Family History:    History reviewed  No pertinent family history  Physical Exam:     Vitals:   Blood Pressure: 96/55 (09/07/18 0222)  Pulse: (!) 128 (09/07/18 0222)  Temperature: (!) 101 1 °F (38 4 °C) (09/07/18 0222)  Temp Source: Temporal (09/07/18 0222)  Respirations: 20 (09/07/18 0222)  Height: 4' 9" (144 8 cm) (09/07/18 0222)  Weight - Scale: 44 8 kg (98 lb 12 3 oz) (09/07/18 0222)  SpO2: 92 % (09/07/18 0222)    Physical Exam   Constitutional: No distress  Very ill appearing, nonverbal   HENT:   Head: Normocephalic and atraumatic  Eyes: EOM are normal  Pupils are equal, round, and reactive to light  Right eye exhibits no discharge  Left eye exhibits no discharge  Neck: Normal range of motion  Neck supple  No JVD present  Cardiovascular: Intact distal pulses  Exam reveals no friction rub  No murmur heard  tachycardic   Pulmonary/Chest: Effort normal and breath sounds normal  No stridor  No respiratory distress  She has no wheezes  She has no rales  Abdominal: Soft  Bowel sounds are normal  She exhibits no distension  There is no tenderness  Musculoskeletal: She exhibits no edema  Neurological:   Awake, nonverbal, lethargic   Skin: Skin is warm and dry  No rash noted  She is not diaphoretic  No erythema  No pallor  Additional Data:     Lab Results: I have personally reviewed pertinent reports          Results from last 7 days  Lab Units 09/07/18  0327 09/06/18  2110   WBC Thousand/uL 20 07* 5 94   HEMOGLOBIN g/dL 13 7 14 3   HEMATOCRIT % 41 6 44 4   PLATELETS Thousands/uL 188 206   NEUTROS PCT %  --  50   LYMPHS PCT %  --  34   MONOS PCT %  --  9   EOS PCT %  --  5       Results from last 7 days  Lab Units 09/07/18  0327   SODIUM mmol/L 139   POTASSIUM mmol/L 3 8   CHLORIDE mmol/L 107   CO2 mmol/L 23   BUN mg/dL 8   CREATININE mg/dL 0 59*   CALCIUM mg/dL 8 2*   ALK PHOS U/L 125*   ALT U/L 106*   AST U/L 98*       Results from last 7 days  Lab Units 09/07/18  0327   INR  1 27*       Results from last 7 days  Lab Units 09/06/18  2115   POC GLUCOSE mg/dl 196*           Imaging: I have personally reviewed pertinent reports  XR chest 1 view portable    (Results Pending)       EKG, Pathology, and Other Studies Reviewed on Admission:   · EKG: none    Allscripts / Epic Records Reviewed: Yes     ** Please Note: This note has been constructed using a voice recognition system   **

## 2018-09-07 NOTE — ED NOTES
Dr Natacha Bentley found pt lying quietly w/pulse ox and oxygen off - pulse ox reapplied - will watch pt 's O2 sat while not on [id number] Fernando Archibald RN  09/07/18 4187

## 2018-09-07 NOTE — ASSESSMENT & PLAN NOTE
-Reports possible seizure while at SNF  -Had one episode of vomiting  -No additional episodes in ER  -Seizure precautions  -Check medication levels  -ativan PRN  -Continue home medication  -Supportive care

## 2018-09-07 NOTE — NURSING NOTE
Patient transported to CT scan  After test was finished, patient was noted to have oxygen sat of 87% on room air  Oxygen administered via NC at 3L and patient's oxygen sat now 96%  No signs of respiratory distress, however, she remains lethargic

## 2018-09-07 NOTE — PHYSICIAN ADVISOR
Current patient class: Inpatient  The patient is currently on Hospital Day: 2 at [address]        The patient was admitted to the hospital  on 9/7/18 at [phone] for the following diagnosis:  Seizure ([facility] [address] ) [R56 9]       There is documentation in the medical record of an expected length of stay of at least 2 midnights  The patient is therefore expected to satisfy the 2 midnight benchmark and given the 2 midnight presumption is appropriate for INPATIENT ADMISSION  Given this expectation of a satisfying stay, CMS instructs us that the patient is most often appropriate for inpatient admission under part A provided medical necessity is documented in the chart  After review of the relevant documentation, labs, vital signs and test results, the patient is appropriate for INPATIENT ADMISSION  Admission to the hospital as an inpatient is a complex decision making process which requires the practitioner to consider the patients presenting complaint, history and physical examination and all relevant testing  With this in mind, in this case, the patient was deemed appropriate for INPATIENT ADMISSION  After review of the documentation and testing available at the time of the admission I concur with this clinical determination of medical necessity  The patient does have an inpatient admission within the previous 30 days  The patient was admitted on 8/17/18 and discharged on 8/24/18 as an inpatient  The patient therefore required readmission review  In this case the patient should be considered a SEPARATE and UNRELATED INPATIENT ADMISSION  The patient had been discharged in stable condition with a completed care plan  There were no unresolved acute medical issues at the time of discharged which would have reasonably been expected to prompt this readmission  Rationale is as follows:     The patient is a 41 yrs   Female who presented to the ED at 9/6/2018  8:46 PM with a chief complaint of Altered Mental Status (Patient was found in her room to be vomiting  Nursing home states that she vomited one time )     Patient admitted with a report of a possible seizure at her nursing facility  The staff noted her to vomit  She meets sepsis criteria and was started on IV antibiotics  She was recently hospitalized for somnolence which was probably induced by medication  She has a medical history of anoxic brain injury and seizures  On this admission a lactate level is 3 3 and a CT of the CAP revealed pneumonia  There is no definitive documentation of a seizure at this time and it would be appropriate to consider this admission as [facility] to the previous admission      The patients vitals on arrival were ED Triage Vitals   Temperature Pulse Respirations Blood Pressure SpO2   09/06/18 2057 09/06/18 2057 09/06/18 2057 09/06/18 2057 09/06/18 2057   97 8 °F (36 6 °C) 89 18 117/63 95 %      Temp Source Heart Rate Source Patient Position - Orthostatic VS BP Location FiO2 (%)   09/06/18 2057 09/06/18 2057 09/06/18 2300 09/06/18 2300 --   Temporal Monitor Lying Left arm       Pain Score       09/06/18 2057       No Pain           Past Medical History:   Diagnosis Date    ADHD     Anoxic brain damage (HCC)     Autistic disorder     Hyperammonemia (HCC)     Hyperkeratosis     Hypotension     Intellectual disability     Intellectual disability     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with tonic seizures (HCC)     Lethargy     Liver enzyme elevation     Onychomycosis     Osteoporosis     Osteoporosis     Psychiatric disorder     anxiety     Past Surgical History:   Procedure Laterality Date    ABDOMINAL SURGERY      CARDIAC PACEMAKER PLACEMENT      JEJUNOSTOMY FEEDING TUBE      PEG TUBE PLACEMENT             Consults have been placed to:   IP CONSULT TO CASE MANAGEMENT    Vitals:    09/07/18 1300 09/07/18 1452 09/07/18 1453 09/07/18 1454   BP: 93/69 93/69     BP Location:       Pulse: 79 79 Resp: (!) 24      Temp:  97 7 °F (36 5 °C)     TempSrc:       SpO2: 94% 94%     Weight:   44 5 kg (98 lb)    Height:   4' 9" (1 448 m) 4' 9" (1 448 m)       Most recent labs:    Recent Labs      09/07/18   0327  09/07/18   0759   WBC  20 07*   --    HGB  13 7   --    HCT  41 6   --    PLT  188   --    K  3 8   --    NA  139   --    CALCIUM  8 2*   --    BUN  8   --    CREATININE  0 59*   --    LIPASE   --   106   AMYLASE   --   79   INR  1 27*   --    AST  98*   --    ALT  106*   --    ALKPHOS  125*   --        Scheduled Meds:  Current Facility-Administered Medications:  acetaminophen 650 mg Rectal Q4H PRN Олег Wang PA-C    lactulose 30 g Per G Tube TID Олег Wang PA-C    levETIRAcetam 1,000 mg Per G Tube Q12H [address] Олег Wang PA-C    levOCARNitine 750 mg Per G Tube TID Олег Wang PA-C    levofloxacin 750 mg Intravenous Q24H Alvin Elkhorn, DO Last Rate: 750 mg (09/07/18 1450)   LORazepam 1 mg Intravenous Q4H PRN Олег Wang PA-C    magnesium hydroxide 30 mL Oral Daily PRN Олег Wang PA-C    melatonin 6 mg Per G Tube HS Олег Wang PA-C    metroNIDAZOLE 500 mg Intravenous Q8H Alvin Elkhorn, DO Last Rate: 500 mg (09/07/18 1447)   ondansetron 4 mg Intravenous Q6H PRN Олег Wang PA-C    perampanel 8 mg Per NG Tube HS Олег Wang PA-C    PHENobarbital 60 mg Per G Tube HS Олег Wang PA-C    potassium chloride 20 mEq Per G Tube Daily Олег Wang PA-C    rufinamide 1,200 mg Per G Tube BID Олег Wang PA-C    saccharomyces boulardii 250 mg Per G Tube Daily Олег Wang PA-C    senna-docusate sodium 1 tablet Per G Tube Daily Олег Wang PA-C    sodium chloride 125 mL/hr Intravenous Continuous Олег Wang PA-C Last Rate: 125 mL/hr (09/07/18 0526)   topiramate 50 mg Per G Tube Daily Олег Wang PA-C    valproic acid 250 mg Per G Tube Q8H Олег Wang PA-C    vancomycin 15 mg/kg Intravenous Q12H Олег Wang PA-C Last Rate: 750 mg (09/07/18 0915)     Continuous Infusions:  sodium chloride 125 mL/hr Last Rate: 125 mL/hr (09/07/18 0526)     PRN Meds:   acetaminophen    LORazepam    magnesium hydroxide    ondansetron    Surgical procedures (if appropriate):

## 2018-09-07 NOTE — SPEECH THERAPY NOTE
Speech Language/Pathology    Consult received and chart reviewed  Patient is not appropriate for evaluation per RN  Eval deferred at this time  Nutrition/hydration recommended via PEG tube

## 2018-09-07 NOTE — CASE MANAGEMENT
Initial Clinical Review  Admission: Date/Time/Statement:   OBSERVATION 9/6/18, CONVERTED TO INPATIENT ADMISSION 9/7/18 FOR CONTINUED CARE & TX  09/07/18 0724 Release Desean Swenson DO (auto-released) From Order: [id number]   09/07/18 0724 Complete Desean Swenson DO    Inpatient Admission   Admitting Physician ANTWON CORTEZ    Level of Care Med Surg    Estimated length of stay More than 2 Midnights    Certification I certify that inpatient services are medically necessary for this patient for a duration of greater than two midnights  See H&P and MD Progress Notes for additional information about the patient's course of treatment  ED: Date/Time/Mode of Arrival:   ED Arrival Information     Expected Arrival Acuity Means of Arrival Escorted By Service Admission Type    - 9/6/2018 20:46 Urgent Ambulance [address] [facility]  [facility]) General Medicine Urgent    Arrival Complaint    Seizure      Chief Complaint:   Chief Complaint   Patient presents with    Altered Mental Status     Patient was found in her room to be vomiting  Nursing home states that she vomited one time  History of Illness:   Zaki Mclaughlin presents to the Emergency Department after she had a possible seizure and concern for aspiration  She was blue for a while and then seemed to be having a hard time breathing    ED Vital Signs:   ED Triage Vitals   Temperature Pulse Respirations Blood Pressure SpO2   09/06/18 2057 09/06/18 2057 09/06/18 2057 09/06/18 2057 09/06/18 2057   97 8 °F (36 6 °C) 89 18 117/63 95 %      Temp Source Heart Rate Source Patient Position - Orthostatic VS BP Location FiO2 (%)   09/06/18 2057 09/06/18 2057 09/06/18 2300 09/06/18 2300 --   Temporal Monitor Lying Left arm       Pain Score       09/06/18 2057       No Pain        Wt Readings from Last 1 Encounters:   09/07/18 44 8 kg (98 lb 12 3 oz)   Vital Signs (abnormal):   T 101 1    Abnormal Labs/Diagnostic Test Results:   PT 15 3 INR 1 27 WBC 20 07 MG 1 4 CR 0 59 CA 8 2 AST 98  ALK PHOS 125 TPROT 6 0 ALB 2 5 LACTIC ACID 3 2  CXR=    pH, Arterial 7 350 - 7 450 7  296   L    pCO2, Arterial 36 0 - 44 0 mm Hg 42 9     pO2, Arterial 75 0 - 129 0 mm Hg 111 5     HCO3, Arterial 22 0 - 28 0 mmol/L 20 4   L    Base Excess, Arterial mmol/L -5 8     O2 Content, Arterial 16 0 - 23 0 mL/dL 20 4     O2 HGB,Arterial  94 0 - 97 0 % 97 3   H    REZA TEST  Yes     Nasal Cannula  6 lpm via nasal cannula     ED Treatment:   Medication Administration from 09/06/2018 2046 to 09/07/2018 0222       Date/Time Order Dose Route Action Action by Comments     09/07/2018 0100 sodium chloride 0 9 % bolus 1,000 mL 1,000 mL Intravenous New Bag Ashia Nassar RN      09/07/2018 0127 acetaminophen (TYLENOL) rectal suppository 650 mg 650 mg Rectal Given Ashia Nassar RN       Past Medical/Surgical History:    Active Ambulatory Problems     Diagnosis Date Noted    Dysphagia 02/22/2017    Acute encephalopathy 02/23/2017    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with tonic seizures [facility] [place] ) 07/06/2017    Lactic acidosis 07/06/2017    Gastrostomy tube obstruction (HCC) 07/14/2017    Excessive daytime sleepiness 11/22/2017    Underweight 11/22/2017    Intellectual disability 11/22/2017    Hyperammonemia (HCC) 11/23/2017    Acute DANIEL (middle ear effusion) 11/23/2017    Macrocytic anemia 11/24/2017    Hypokalemia 11/24/2017    Acute respiratory failure with hypercapnia (HCC) 12/02/2017    Hypernatremia 12/06/2017    Osteoporosis 09/17/2013    Onychomycosis 09/17/2013    Lethargy 10/10/2016    Hyperkeratosis 09/17/2013    Elevated liver enzymes 06/21/2016    ADHD, 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    Intractable epilepsy without status epilepticus ([facility] [place] ) 04/23/2018    Somnolence 08/18/2018    Acute hepatic encephalopathy 08/18/2018    Transaminitis 08/18/2018    Hypotension 08/20/2018    Incontinence 08/23/2018     Resolved Ambulatory Problems     Diagnosis Date Noted    Pneumonia 02/22/2017  HCAP (healthcare-associated pneumonia) 02/23/2017    Positive blood culture 07/07/2017    Cerebral anoxic injury ([facility] ) 09/17/2013     Past Medical History:   Diagnosis Date    ADHD     Anoxic brain damage (HCC)     Autistic disorder     Hyperammonemia (HCC)     Hyperkeratosis     Hypotension     Intellectual disability     Intellectual disability     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with tonic seizures (HCC)     Lethargy     Liver enzyme elevation     Onychomycosis     Osteoporosis     Osteoporosis     Psychiatric disorder    Admitting Diagnosis: Seizure ([place] [address] ) [R56 9]  Age/Sex: 41 y o  female  Assessment/Plan:   Sepsis due to undetermined organism ([place] [address] )   Assessment & Plan     -Present on admission  -No clear etiology, Possible UTI vs Pneumonia   -leukocytosis, tachycardia, lactic acidosis, fever  -Chest X-ray completed official report pending  -Admit to med surg  -Telemetry monitoring  -IV normal saline  -IV vancomycin, Cipro  -Cultures pending  -UA pending  -Check procalcitonin  -AM labs  -Supportive care   PER MD 9/7/18  Sepsis secondary to pneumonia, suspect aspiration pneumonia, possible gram-negative pneumonia, patient will need broad-spectrum antibiotic coverage for treatment of possible healthcare associated pneumonia, MRSA swab pending  Will add Levaquin and Flagyl, IV Vanco will be continued  Today is antibiotic day 1    Admission Orders:  TELEMETRY  SEIZURE PRECAUTIONS  ELEVATE HOB  ST EVAL & TX  O2 TO KEEP SATS>92%  Scheduled Meds:  Current Facility-Administered Medications:  acetaminophen 650 mg Rectal Q4H PRN Олег Wang PA-C    lactulose 30 g Per G Tube TID Олег Wang PA-C    levETIRAcetam 1,000 mg Per G Tube Q12H [address] Олег Wang PA-C    levOCARNitine 750 mg Per G Tube TID Олег Wang PA-C    levofloxacin 750 mg Intravenous Q24H Tristan Harvey DO    LORazepam 1 mg Intravenous Q4H PRN Олег Wang PA-C    magnesium hydroxide 30 mL Oral Daily PRN Олег Wang PA-C    melatonin 6 mg Per G Tube HS Олег Wang PA-C    metroNIDAZOLE 500 mg Intravenous Q8H Tristan Harvey DO    ondansetron 4 mg Intravenous Q6H PRN Олег Wang PA-C    perampanel 8 mg Per NG Tube HS Олег Wang PA-C    PHENobarbital 60 mg Per G Tube HS Олег Wang PA-C    potassium chloride 20 mEq Per G Tube Daily Олег Wang PA-C    rufinamide 1,200 mg Per G Tube BID Олег Wang PA-C    saccharomyces boulardii 250 mg Per G Tube Daily Олег Wang PA-C    senna-docusate sodium 1 tablet Per G Tube Daily Олег Wang PA-C    sodium chloride 125 mL/hr Intravenous Continuous Олег Wang PA-C Last Rate: 125 mL/hr (09/07/18 0526)   topiramate 50 mg Per G Tube Daily Олег Wang PA-C    valproic acid 250 mg Per G Tube Q8H Олег Wang PA-C    vancomycin 15 mg/kg Intravenous Q12H Олег Wang PA-C Last Rate: 750 mg (09/07/18 0915)     Continuous Infusions:  sodium chloride 125 mL/hr Last Rate: 125 mL/hr (09/07/18 0526)     PRN Meds:   acetaminophen    LORazepam    magnesium hydroxide    ondansetron

## 2018-09-07 NOTE — SOCIAL WORK
The patient is a resident at the [facility] and she is a 1 to 1  At the [facility]  The patient is on seizure precautions there  The patient is a 30 day readmission as she was a patient [facility]  D/c 08/24/2018 she was there for a seizure   This admission sepsis  She is totally dependent at the skilled nursing for all adls at the [facility]  The patient is a MA bed hold at the [facility]  She will be transferred there on d/c

## 2018-09-07 NOTE — ASSESSMENT & PLAN NOTE
-Appears to be trending down  -Liver US on 8/15/18 unremarkable  -Seen By GI on 8/20/2018  -Recommended change in seizure medication  -Will continue to monitor   -GI consult if no Improvement

## 2018-09-07 NOTE — NURSING NOTE
Patient given tylenol supp per rectum for temp of 100 5  Cipro ATB started as per PA request   IV fluids of NSS increased to 125ml/hr

## 2018-09-07 NOTE — PLAN OF CARE
Problem: Nutrition/Hydration-ADULT  Goal: Nutrient/Hydration intake appropriate for improving, restoring or maintaining nutritional needs  Monitor and assess patient's nutrition/hydration status for malnutrition (ex- brittle hair, bruises, dry skin, pale skin and conjunctiva, muscle wasting, smooth red tongue, and disorientation)  Collaborate with interdisciplinary team and initiate plan and interventions as ordered  Monitor patient's weight and dietary intake as ordered or per policy  Utilize nutrition screening tool and intervene per policy  Determine patient's food preferences and provide high-protein, high-caloric foods as appropriate  INTERVENTIONS:  - Monitor oral intake, urinary output, labs, and treatment plans  - Assess nutrition and hydration status and recommend course of action  - Evaluate amount of meals eaten  - Assist patient with eating if necessary   - Allow adequate time for meals  - Recommend/ encourage appropriate diets, oral nutritional supplements, and vitamin/mineral supplements  - Order, calculate, and assess calorie counts as needed  - Recommend, monitor, and adjust tube feedings and TPN/PPN based on assessed needs  - Assess need for intravenous fluids  - Provide specific nutrition/hydration education as appropriate  - Include patient/family/caregiver in decisions related to nutrition   Outcome: Progressing  Recommend initiate EN feedings  Recommend Jevity 1 2 300mL bolus q6hrs providing 1440kcal/98% needs met; 67g pro/105% needs met and 968mL water  Recommend 100mL free H20 flush qshift; continue normal flushes with med pass  Will continue to monitor pt's nutrition status and TF tolerance

## 2018-09-07 NOTE — ASSESSMENT & PLAN NOTE
-Present on admission  -No clear etiology, Possible UTI vs Pneumonia   -leukocytosis, tachycardia, lactic acidosis, fever  -Chest X-ray completed official report pending  -Admit to med surg  -Telemetry monitoring  -IV normal saline  -IV vancomycin, Cipro  -Cultures pending  -UA pending  -Check procalcitonin  -AM labs  -Supportive care

## 2018-09-08 LAB
ALBUMIN SERPL BCP-MCNC: 2.2 G/DL (ref 3.5–5)
ALP SERPL-CCNC: 115 U/L (ref 46–116)
ALT SERPL W P-5'-P-CCNC: 79 U/L (ref 12–78)
ANION GAP SERPL CALCULATED.3IONS-SCNC: 9 MMOL/L (ref 4–13)
ARTERIAL PATENCY WRIST A: YES
AST SERPL W P-5'-P-CCNC: 69 U/L (ref 5–45)
BASE EXCESS BLDA CALC-SCNC: -0.6 MMOL/L
BASOPHILS # BLD AUTO: 0.06 THOUSANDS/ΜL (ref 0–0.1)
BASOPHILS NFR BLD AUTO: 0 % (ref 0–1)
BILIRUB SERPL-MCNC: 0.6 MG/DL (ref 0.2–1)
BUN SERPL-MCNC: 3 MG/DL (ref 5–25)
CALCIUM SERPL-MCNC: 7.7 MG/DL (ref 8.3–10.1)
CHLORIDE SERPL-SCNC: 104 MMOL/L (ref 100–108)
CO2 SERPL-SCNC: 26 MMOL/L (ref 21–32)
CREAT SERPL-MCNC: 0.49 MG/DL (ref 0.6–1.3)
EOSINOPHIL # BLD AUTO: 0.07 THOUSAND/ΜL (ref 0–0.61)
EOSINOPHIL NFR BLD AUTO: 0 % (ref 0–6)
ERYTHROCYTE [DISTWIDTH] IN BLOOD BY AUTOMATED COUNT: 13.8 % (ref 11.6–15.1)
GFR SERPL CREATININE-BSD FRML MDRD: 125 ML/MIN/1.73SQ M
GLUCOSE SERPL-MCNC: 102 MG/DL (ref 65–140)
HCO3 BLDA-SCNC: 24 MMOL/L (ref 22–28)
HCT VFR BLD AUTO: 35.9 % (ref 34.8–46.1)
HGB BLD-MCNC: 12.1 G/DL (ref 11.5–15.4)
IMM GRANULOCYTES # BLD AUTO: 0.09 THOUSAND/UL (ref 0–0.2)
IMM GRANULOCYTES NFR BLD AUTO: 0 % (ref 0–2)
INR PPP: 1.41 (ref 0.86–1.17)
LACTATE SERPL-SCNC: 2.7 MMOL/L (ref 0.5–2)
LACTATE SERPL-SCNC: 3 MMOL/L (ref 0.5–2)
LACTATE SERPL-SCNC: 3.5 MMOL/L (ref 0.5–2)
LACTATE SERPL-SCNC: 3.8 MMOL/L (ref 0.5–2)
LYMPHOCYTES # BLD AUTO: 2.36 THOUSANDS/ΜL (ref 0.6–4.47)
LYMPHOCYTES NFR BLD AUTO: 11 % (ref 14–44)
MAGNESIUM SERPL-MCNC: 1.4 MG/DL (ref 1.6–2.6)
MCH RBC QN AUTO: 34.4 PG (ref 26.8–34.3)
MCHC RBC AUTO-ENTMCNC: 33.7 G/DL (ref 31.4–37.4)
MCV RBC AUTO: 102 FL (ref 82–98)
MONOCYTES # BLD AUTO: 1.27 THOUSAND/ΜL (ref 0.17–1.22)
MONOCYTES NFR BLD AUTO: 6 % (ref 4–12)
NEUTROPHILS # BLD AUTO: 17.71 THOUSANDS/ΜL (ref 1.85–7.62)
NEUTS SEG NFR BLD AUTO: 83 % (ref 43–75)
NON VENT ROOM AIR: 21 %
NRBC BLD AUTO-RTO: 0 /100 WBCS
O2 CT BLDA-SCNC: 16.6 ML/DL (ref 16–23)
OXYHGB MFR BLDA: 93.6 % (ref 94–97)
PCO2 BLDA: 39.4 MM HG (ref 36–44)
PH BLDA: 7.4 [PH] (ref 7.35–7.45)
PHOSPHATE SERPL-MCNC: 2.2 MG/DL (ref 2.7–4.5)
PLATELET # BLD AUTO: 149 THOUSANDS/UL (ref 149–390)
PMV BLD AUTO: 11.4 FL (ref 8.9–12.7)
PO2 BLDA: 71.3 MM HG (ref 75–129)
POTASSIUM SERPL-SCNC: 3.4 MMOL/L (ref 3.5–5.3)
PROCALCITONIN SERPL-MCNC: 1.02 NG/ML
PROT SERPL-MCNC: 5.3 G/DL (ref 6.4–8.2)
PROTHROMBIN TIME: 16.6 SECONDS (ref 11.8–14.2)
RBC # BLD AUTO: 3.52 MILLION/UL (ref 3.81–5.12)
SODIUM SERPL-SCNC: 139 MMOL/L (ref 136–145)
SPECIMEN SOURCE: ABNORMAL
WBC # BLD AUTO: 21.56 THOUSAND/UL (ref 4.31–10.16)

## 2018-09-08 PROCEDURE — 85610 PROTHROMBIN TIME: CPT | Performed by: INTERNAL MEDICINE

## 2018-09-08 PROCEDURE — 85025 COMPLETE CBC W/AUTO DIFF WBC: CPT | Performed by: INTERNAL MEDICINE

## 2018-09-08 PROCEDURE — 80053 COMPREHEN METABOLIC PANEL: CPT | Performed by: INTERNAL MEDICINE

## 2018-09-08 PROCEDURE — 83605 ASSAY OF LACTIC ACID: CPT | Performed by: INTERNAL MEDICINE

## 2018-09-08 PROCEDURE — 84145 PROCALCITONIN (PCT): CPT | Performed by: INTERNAL MEDICINE

## 2018-09-08 PROCEDURE — 99232 SBSQ HOSP IP/OBS MODERATE 35: CPT | Performed by: INTERNAL MEDICINE

## 2018-09-08 PROCEDURE — 84100 ASSAY OF PHOSPHORUS: CPT | Performed by: INTERNAL MEDICINE

## 2018-09-08 PROCEDURE — 83735 ASSAY OF MAGNESIUM: CPT | Performed by: INTERNAL MEDICINE

## 2018-09-08 PROCEDURE — 36600 WITHDRAWAL OF ARTERIAL BLOOD: CPT

## 2018-09-08 PROCEDURE — 83605 ASSAY OF LACTIC ACID: CPT | Performed by: PHYSICIAN ASSISTANT

## 2018-09-08 PROCEDURE — 82805 BLOOD GASES W/O2 SATURATION: CPT | Performed by: INTERNAL MEDICINE

## 2018-09-08 RX ORDER — TOPIRAMATE 100 MG/1
200 TABLET, FILM COATED ORAL EVERY 12 HOURS SCHEDULED
Status: DISCONTINUED | OUTPATIENT
Start: 2018-09-08 | End: 2018-09-12 | Stop reason: HOSPADM

## 2018-09-08 RX ORDER — MAGNESIUM SULFATE HEPTAHYDRATE 40 MG/ML
2 INJECTION, SOLUTION INTRAVENOUS ONCE
Status: COMPLETED | OUTPATIENT
Start: 2018-09-08 | End: 2018-09-08

## 2018-09-08 RX ORDER — SODIUM CHLORIDE AND POTASSIUM CHLORIDE .9; .15 G/100ML; G/100ML
75 SOLUTION INTRAVENOUS CONTINUOUS
Status: DISCONTINUED | OUTPATIENT
Start: 2018-09-08 | End: 2018-09-10

## 2018-09-08 RX ORDER — MAGNESIUM SULFATE 1 G/100ML
1 INJECTION INTRAVENOUS ONCE
Status: COMPLETED | OUTPATIENT
Start: 2018-09-08 | End: 2018-09-08

## 2018-09-08 RX ORDER — PHENOBARBITAL 20 MG/5ML
80 ELIXIR ORAL
Status: DISCONTINUED | OUTPATIENT
Start: 2018-09-08 | End: 2018-09-12 | Stop reason: HOSPADM

## 2018-09-08 RX ADMIN — RUFINAMIDE 1200 MG: 200 TABLET, FILM COATED ORAL at 08:10

## 2018-09-08 RX ADMIN — VANCOMYCIN HYDROCHLORIDE 750 MG: 500 INJECTION, POWDER, LYOPHILIZED, FOR SOLUTION INTRAVENOUS at 09:34

## 2018-09-08 RX ADMIN — PHENOBARBITAL 80 MG: 20 LIQUID ORAL at 23:28

## 2018-09-08 RX ADMIN — VALPROIC ACID 250 MG: 500 SOLUTION ORAL at 09:34

## 2018-09-08 RX ADMIN — LEVOFLOXACIN 750 MG: 5 INJECTION, SOLUTION INTRAVENOUS at 16:30

## 2018-09-08 RX ADMIN — POTASSIUM CHLORIDE 20 MEQ: 20 SOLUTION ORAL at 08:10

## 2018-09-08 RX ADMIN — MELATONIN TAB 3 MG 6 MG: 3 TAB at 23:28

## 2018-09-08 RX ADMIN — MAGNESIUM SULFATE HEPTAHYDRATE 2 G: 40 INJECTION, SOLUTION INTRAVENOUS at 11:37

## 2018-09-08 RX ADMIN — Medication 250 MG: at 08:10

## 2018-09-08 RX ADMIN — LEVOCARNITINE 750 MG: 1 SOLUTION ORAL at 08:10

## 2018-09-08 RX ADMIN — METRONIDAZOLE 500 MG: 500 INJECTION, SOLUTION INTRAVENOUS at 05:01

## 2018-09-08 RX ADMIN — RUFINAMIDE 1200 MG: 200 TABLET, FILM COATED ORAL at 19:00

## 2018-09-08 RX ADMIN — TOPIRAMATE 50 MG: 25 TABLET, FILM COATED ORAL at 08:10

## 2018-09-08 RX ADMIN — ACETAMINOPHEN 650 MG: 650 SUPPOSITORY RECTAL at 19:50

## 2018-09-08 RX ADMIN — SODIUM CHLORIDE AND POTASSIUM CHLORIDE 125 ML/HR: .9; .15 SOLUTION INTRAVENOUS at 05:32

## 2018-09-08 RX ADMIN — LACTULOSE 30 G: 10 SOLUTION ORAL at 08:08

## 2018-09-08 RX ADMIN — VANCOMYCIN HYDROCHLORIDE 750 MG: 500 INJECTION, POWDER, LYOPHILIZED, FOR SOLUTION INTRAVENOUS at 19:50

## 2018-09-08 RX ADMIN — LEVETIRACETAM 1000 MG: 100 SOLUTION ORAL at 20:55

## 2018-09-08 RX ADMIN — ENOXAPARIN SODIUM 40 MG: 40 INJECTION, SOLUTION INTRAVENOUS; SUBCUTANEOUS at 11:37

## 2018-09-08 RX ADMIN — METRONIDAZOLE 500 MG: 500 INJECTION, SOLUTION INTRAVENOUS at 12:03

## 2018-09-08 RX ADMIN — LEVETIRACETAM 1000 MG: 100 SOLUTION ORAL at 08:10

## 2018-09-08 RX ADMIN — TOPIRAMATE 200 MG: 100 TABLET, FILM COATED ORAL at 20:55

## 2018-09-08 RX ADMIN — LACTULOSE 30 G: 10 SOLUTION ORAL at 16:30

## 2018-09-08 RX ADMIN — LEVOCARNITINE 750 MG: 1 SOLUTION ORAL at 20:55

## 2018-09-08 RX ADMIN — METRONIDAZOLE 500 MG: 500 INJECTION, SOLUTION INTRAVENOUS at 22:00

## 2018-09-08 RX ADMIN — POTASSIUM PHOSPHATE, MONOBASIC AND POTASSIUM PHOSPHATE, DIBASIC 12 MMOL: 224; 236 INJECTION, SOLUTION INTRAVENOUS at 12:07

## 2018-09-08 RX ADMIN — LEVOCARNITINE 750 MG: 1 SOLUTION ORAL at 16:30

## 2018-09-08 RX ADMIN — VALPROIC ACID 250 MG: 500 SOLUTION ORAL at 02:34

## 2018-09-08 RX ADMIN — MAGNESIUM SULFATE HEPTAHYDRATE 1 G: 1 INJECTION, SOLUTION INTRAVENOUS at 06:20

## 2018-09-08 RX ADMIN — VALPROIC ACID 250 MG: 500 SOLUTION ORAL at 19:00

## 2018-09-08 RX ADMIN — POTASSIUM PHOSPHATE, MONOBASIC AND POTASSIUM PHOSPHATE, DIBASIC 6 MMOL: 224; 236 INJECTION, SOLUTION INTRAVENOUS at 09:34

## 2018-09-08 RX ADMIN — LACTULOSE 30 G: 10 SOLUTION ORAL at 20:55

## 2018-09-08 NOTE — PROGRESS NOTES
Progress Note - Kurt Barrier 1980, 41 y o  female MRN: [id number]    Unit/Bed#:  Encounter: [id number]    Primary Care Provider: Philip Sierra DO   Date and time admitted to hospital: 2018  8:46 PM        * Sepsis due to undetermined organism [facility])   Assessment & Plan      Sepsis was present on admission, secondary to pneumonia, suspected aspiration pneumonia, possible gram-negative pneumonia, continue current broad-spectrum antibiotics     follow-up procalcitonin level, follow blood cultures  Acute encephalopathy   Assessment & Plan      Patient is still severely lethargic, I am not sure what patient's normal baseline is, have discussed case with patient's nursing home the past and they have told me that she is quite interactive on a good day        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with tonic seizures ([facility] [place] [address] )   Assessment & Plan    -Reports possible seizure while at SNF   patient was recently transferred to [facility] for neurology evaluation, had multiple medications adjusted, continue with same meds, previous discharge from [facility] was reviewed in detail, with close attention to medications and dosing  Dysphagia   Assessment & Plan    -Intact PEG tube  -Continue peg tube feedings  -Nutrition consult                Code Status: Level 1 - Full Code      Subjective:     No pain, patient is somnolent, essentially only minimally responsive to verbal stimuli  patient noted to have loose bowel movements, patient is on lactulose chronically    Objective:     Vitals:   Temp (24hrs), Av 3 °F (36 8 °C), Min:96 8 °F (36 °C), Max:99 5 °F (37 5 °C)    HR:  [] 90  Resp:  [15-34] 21  BP: ()/(56-78) 94/59  SpO2:  [91 %-98 %] 92 %  Body mass index is 22 61 kg/m²  Input and Output Summary (last 24 hours):        Intake/Output Summary (Last 24 hours) at 18 1027  Last data filed at 18 0935   Gross per 24 hour   Intake          2442 42 ml Output                0 ml   Net          2442 42 ml       Physical Exam:     Physical Exam   Constitutional:   Chronically ill-appearing cachectic female   HENT:   Head: Normocephalic  Cardiovascular: Normal rate and regular rhythm  Pulmonary/Chest: Effort normal and breath sounds normal  No respiratory distress  She has no wheezes  Abdominal: Soft  Bowel sounds are normal  She exhibits distension  There is no tenderness  Skin: No rash noted  No erythema  No pallor  Nursing note and vitals reviewed  Additional Data:     Labs:      Results from last 7 days  Lab Units 09/08/18  0431   WBC Thousand/uL 21 56*   HEMOGLOBIN g/dL 12 1   HEMATOCRIT % 35 9   PLATELETS Thousands/uL 149   NEUTROS PCT % 83*   LYMPHS PCT % 11*   MONOS PCT % 6   EOS PCT % 0       Results from last 7 days  Lab Units 09/08/18  0431   SODIUM mmol/L 139   POTASSIUM mmol/L 3 4*   CHLORIDE mmol/L 104   CO2 mmol/L 26   BUN mg/dL 3*   CREATININE mg/dL 0 49*   CALCIUM mg/dL 7 7*   ALK PHOS U/L 115   ALT U/L 79*   AST U/L 69*       Results from last 7 days  Lab Units 09/08/18  0431   INR  1 41*       * I Have Reviewed All Lab Data Listed Above  * Additional Pertinent Lab Tests Reviewed:  [facility] Review[facility]      Recent Cultures (last 7 days):           Last 24 Hours Medication List:     Current Facility-Administered Medications:  acetaminophen 650 mg Rectal Q4H PRN Олег Wang PA-C    lactulose 30 g Per G Tube TID Олег Wang PA-C    levETIRAcetam 1,000 mg Per G Tube Q12H [facility] Олег Wang PA-C    levOCARNitine 750 mg Per G Tube TID Олег Wang PA-C    levofloxacin 750 mg Intravenous Q24H Tami Ernst, DO Last Rate: 750 mg (09/07/18 1450)   LORazepam 1 mg Intravenous Q4H PRN Олег Wang PA-C    magnesium hydroxide 30 mL Oral Daily PRN Олег Wang PA-C    magnesium sulfate 2 g Intravenous Once Tami Ernst, DO    melatonin 6 mg Per G Tube HS Олег Wang PA-C    metroNIDAZOLE 500 mg Intravenous Q8H Radha Pinzon DO Last Rate: 500 mg (09/08/18 0501)   ondansetron 4 mg Intravenous Q6H PRN Олегmaurice Wang PA-C    perampanel 8 mg Per NG Tube HS ОлегCATIE Perla-BIGG    PHENobarbital 80 mg Per G Tube HS Radha Pinzon DO    potassium chloride 20 mEq Per G Tube Daily ОлегLORETA PerlaC    potassium phosphate 12 mmol Intravenous Once Radha Pinzon,     potassium phosphate 6 mmol Intravenous Once Олегmaurice Wang PA-C Last Rate: 6 mmol (09/08/18 0934)   rufinamide 1,200 mg Per G Tube BID Олегmaurice Wang PA-C    saccharomyces boulardii 250 mg Per G Tube Daily Олегmaurice Wang PA-C    senna-docusate sodium 1 tablet Per G Tube Daily Олег CATIE Wang-C    sodium chloride 0 9 % with KCl 20 mEq/L 125 mL/hr Intravenous Continuous LORETA WeaverC Last Rate: 125 mL/hr (09/08/18 0532)   topiramate 200 mg Per G Tube Q12H [address] Radha Pinzon DO    valproic acid 250 mg Per G Tube Q8H Олег WAGNER Wang    vancomycin 15 mg/kg Intravenous Q12H Олег Felipe PA-C Last Rate: 750 mg (09/08/18 0934)        Today, Patient Was Seen By: Radha Pinzon DO

## 2018-09-08 NOTE — ASSESSMENT & PLAN NOTE
-Reports possible seizure while at SNF   patient was recently transferred to [address] [place] [address] for neurology evaluation, had multiple medications adjusted, continue with same meds, previous discharge from [facility] was reviewed in detail, with close attention to medications and dosing

## 2018-09-08 NOTE — PROGRESS NOTES
Dr Frances Patel made aware of 2 occurrences of large liquid BM  Pt on lactulose and senna (which senna was held this morning)  No testing for cdiff at this time

## 2018-09-08 NOTE — NURSING NOTE
Called lab for lactic acid level drawn at 7pm and still not showing in system  With a 3hour lapse from time lab drawn & reported is 3 8--stat lactic to be drawn again  Palomo Allen made aware

## 2018-09-08 NOTE — ASSESSMENT & PLAN NOTE
Sepsis was present on admission, secondary to pneumonia, suspected aspiration pneumonia, possible gram-negative pneumonia, continue current broad-spectrum antibiotics     follow-up procalcitonin level, follow blood cultures

## 2018-09-08 NOTE — ASSESSMENT & PLAN NOTE
Patient is still severely lethargic, I am not sure what patient's normal baseline is, have discussed case with patient's nursing home the past and they have told me that she is quite interactive on a good day

## 2018-09-08 NOTE — NURSING NOTE
Repeat lactic acid 4 4, this was called to [employer]  Alphonse Infante is enroute to see patient

## 2018-09-09 PROBLEM — E87.20 LACTIC ACIDOSIS: Status: RESOLVED | Noted: 2017-07-06 | Resolved: 2018-09-09

## 2018-09-09 PROBLEM — E87.2 LACTIC ACIDOSIS: Status: RESOLVED | Noted: 2017-07-06 | Resolved: 2018-09-09

## 2018-09-09 PROBLEM — E87.6 HYPOKALEMIA: Status: RESOLVED | Noted: 2017-11-24 | Resolved: 2018-09-09

## 2018-09-09 LAB
ALBUMIN SERPL BCP-MCNC: 2 G/DL (ref 3.5–5)
ALP SERPL-CCNC: 119 U/L (ref 46–116)
ALT SERPL W P-5'-P-CCNC: 69 U/L (ref 12–78)
AMMONIA PLAS-SCNC: 29 UMOL/L (ref 11–35)
ANION GAP SERPL CALCULATED.3IONS-SCNC: 9 MMOL/L (ref 4–13)
AST SERPL W P-5'-P-CCNC: 58 U/L (ref 5–45)
BASOPHILS # BLD AUTO: 0.07 THOUSANDS/ΜL (ref 0–0.1)
BASOPHILS NFR BLD AUTO: 1 % (ref 0–1)
BILIRUB SERPL-MCNC: 0.6 MG/DL (ref 0.2–1)
BILIRUB UR QL STRIP: NEGATIVE
BUN SERPL-MCNC: 4 MG/DL (ref 5–25)
CALCIUM SERPL-MCNC: 8 MG/DL (ref 8.3–10.1)
CHLORIDE SERPL-SCNC: 107 MMOL/L (ref 100–108)
CLARITY UR: CLEAR
CO2 SERPL-SCNC: 24 MMOL/L (ref 21–32)
COLOR UR: YELLOW
CREAT SERPL-MCNC: 0.38 MG/DL (ref 0.6–1.3)
EOSINOPHIL # BLD AUTO: 0.48 THOUSAND/ΜL (ref 0–0.61)
EOSINOPHIL NFR BLD AUTO: 3 % (ref 0–6)
ERYTHROCYTE [DISTWIDTH] IN BLOOD BY AUTOMATED COUNT: 13.5 % (ref 11.6–15.1)
GFR SERPL CREATININE-BSD FRML MDRD: 136 ML/MIN/1.73SQ M
GLUCOSE SERPL-MCNC: 98 MG/DL (ref 65–140)
GLUCOSE UR STRIP-MCNC: NEGATIVE MG/DL
HCT VFR BLD AUTO: 36 % (ref 34.8–46.1)
HGB BLD-MCNC: 12 G/DL (ref 11.5–15.4)
HGB UR QL STRIP.AUTO: NEGATIVE
IMM GRANULOCYTES # BLD AUTO: 0.05 THOUSAND/UL (ref 0–0.2)
IMM GRANULOCYTES NFR BLD AUTO: 0 % (ref 0–2)
KETONES UR STRIP-MCNC: NEGATIVE MG/DL
LEUKOCYTE ESTERASE UR QL STRIP: NEGATIVE
LYMPHOCYTES # BLD AUTO: 3.38 THOUSANDS/ΜL (ref 0.6–4.47)
LYMPHOCYTES NFR BLD AUTO: 24 % (ref 14–44)
MAGNESIUM SERPL-MCNC: 1.6 MG/DL (ref 1.6–2.6)
MCH RBC QN AUTO: 33.8 PG (ref 26.8–34.3)
MCHC RBC AUTO-ENTMCNC: 33.3 G/DL (ref 31.4–37.4)
MCV RBC AUTO: 101 FL (ref 82–98)
MONOCYTES # BLD AUTO: 1.17 THOUSAND/ΜL (ref 0.17–1.22)
MONOCYTES NFR BLD AUTO: 8 % (ref 4–12)
MRSA NOSE QL CULT: ABNORMAL
MRSA NOSE QL CULT: ABNORMAL
NEUTROPHILS # BLD AUTO: 9.1 THOUSANDS/ΜL (ref 1.85–7.62)
NEUTS SEG NFR BLD AUTO: 64 % (ref 43–75)
NITRITE UR QL STRIP: NEGATIVE
NRBC BLD AUTO-RTO: 0 /100 WBCS
PH UR STRIP.AUTO: 7.5 [PH] (ref 4.5–8)
PHOSPHATE SERPL-MCNC: 3.2 MG/DL (ref 2.7–4.5)
PLATELET # BLD AUTO: 148 THOUSANDS/UL (ref 149–390)
PMV BLD AUTO: 12.1 FL (ref 8.9–12.7)
POTASSIUM SERPL-SCNC: 3.6 MMOL/L (ref 3.5–5.3)
PROCALCITONIN SERPL-MCNC: 0.73 NG/ML
PROT SERPL-MCNC: 5.2 G/DL (ref 6.4–8.2)
PROT UR STRIP-MCNC: NEGATIVE MG/DL
RBC # BLD AUTO: 3.55 MILLION/UL (ref 3.81–5.12)
SODIUM SERPL-SCNC: 140 MMOL/L (ref 136–145)
SP GR UR STRIP.AUTO: 1.01 (ref 1–1.03)
UROBILINOGEN UR QL STRIP.AUTO: 0.2 E.U./DL
VANCOMYCIN TROUGH SERPL-MCNC: 3.3 UG/ML (ref 10–20)
WBC # BLD AUTO: 14.25 THOUSAND/UL (ref 4.31–10.16)

## 2018-09-09 PROCEDURE — 85025 COMPLETE CBC W/AUTO DIFF WBC: CPT | Performed by: INTERNAL MEDICINE

## 2018-09-09 PROCEDURE — 81003 URINALYSIS AUTO W/O SCOPE: CPT | Performed by: PHYSICIAN ASSISTANT

## 2018-09-09 PROCEDURE — 82140 ASSAY OF AMMONIA: CPT | Performed by: PHYSICIAN ASSISTANT

## 2018-09-09 PROCEDURE — 84145 PROCALCITONIN (PCT): CPT | Performed by: INTERNAL MEDICINE

## 2018-09-09 PROCEDURE — 80053 COMPREHEN METABOLIC PANEL: CPT | Performed by: INTERNAL MEDICINE

## 2018-09-09 PROCEDURE — 84100 ASSAY OF PHOSPHORUS: CPT | Performed by: INTERNAL MEDICINE

## 2018-09-09 PROCEDURE — 99232 SBSQ HOSP IP/OBS MODERATE 35: CPT | Performed by: INTERNAL MEDICINE

## 2018-09-09 PROCEDURE — 83735 ASSAY OF MAGNESIUM: CPT | Performed by: INTERNAL MEDICINE

## 2018-09-09 PROCEDURE — 80202 ASSAY OF VANCOMYCIN: CPT | Performed by: PHYSICIAN ASSISTANT

## 2018-09-09 RX ADMIN — SODIUM CHLORIDE AND POTASSIUM CHLORIDE 75 ML/HR: .9; .15 SOLUTION INTRAVENOUS at 00:41

## 2018-09-09 RX ADMIN — RUFINAMIDE 1200 MG: 200 TABLET, FILM COATED ORAL at 18:06

## 2018-09-09 RX ADMIN — VALPROIC ACID 250 MG: 500 SOLUTION ORAL at 11:43

## 2018-09-09 RX ADMIN — RUFINAMIDE 1200 MG: 200 TABLET, FILM COATED ORAL at 08:41

## 2018-09-09 RX ADMIN — POTASSIUM CHLORIDE 20 MEQ: 20 SOLUTION ORAL at 08:40

## 2018-09-09 RX ADMIN — TOPIRAMATE 200 MG: 100 TABLET, FILM COATED ORAL at 22:53

## 2018-09-09 RX ADMIN — METRONIDAZOLE 500 MG: 500 INJECTION, SOLUTION INTRAVENOUS at 12:08

## 2018-09-09 RX ADMIN — MELATONIN TAB 3 MG 6 MG: 3 TAB at 22:58

## 2018-09-09 RX ADMIN — Medication 250 MG: at 08:41

## 2018-09-09 RX ADMIN — METRONIDAZOLE 500 MG: 500 INJECTION, SOLUTION INTRAVENOUS at 21:30

## 2018-09-09 RX ADMIN — LEVOCARNITINE 750 MG: 1 SOLUTION ORAL at 08:40

## 2018-09-09 RX ADMIN — SODIUM CHLORIDE AND POTASSIUM CHLORIDE 75 ML/HR: .9; .15 SOLUTION INTRAVENOUS at 19:25

## 2018-09-09 RX ADMIN — VALPROIC ACID 250 MG: 500 SOLUTION ORAL at 02:33

## 2018-09-09 RX ADMIN — LEVETIRACETAM 1000 MG: 100 SOLUTION ORAL at 08:40

## 2018-09-09 RX ADMIN — LEVOCARNITINE 750 MG: 1 SOLUTION ORAL at 22:54

## 2018-09-09 RX ADMIN — METRONIDAZOLE 500 MG: 500 INJECTION, SOLUTION INTRAVENOUS at 05:35

## 2018-09-09 RX ADMIN — VALPROIC ACID 250 MG: 500 SOLUTION ORAL at 18:06

## 2018-09-09 RX ADMIN — LEVOCARNITINE 750 MG: 1 SOLUTION ORAL at 17:00

## 2018-09-09 RX ADMIN — LEVETIRACETAM 1000 MG: 100 SOLUTION ORAL at 23:02

## 2018-09-09 RX ADMIN — TOPIRAMATE 200 MG: 100 TABLET, FILM COATED ORAL at 08:40

## 2018-09-09 RX ADMIN — PHENOBARBITAL 80 MG: 20 LIQUID ORAL at 22:56

## 2018-09-09 RX ADMIN — LACTULOSE 30 G: 10 SOLUTION ORAL at 08:40

## 2018-09-09 RX ADMIN — LEVOFLOXACIN 750 MG: 5 INJECTION, SOLUTION INTRAVENOUS at 14:32

## 2018-09-09 RX ADMIN — ENOXAPARIN SODIUM 40 MG: 40 INJECTION, SOLUTION INTRAVENOUS; SUBCUTANEOUS at 08:40

## 2018-09-09 NOTE — PROGRESS NOTES
Patient had 4 large incontinent BMs during 3-11 shift and was reported to have 2 BMs during day shift as well which has been determined to be from lactulose  PA-BIGG notified of the same and new order received to check ammonia level with morning labs  Patient's father, Tamar Lowe, had also called and requested an update  At beginning of shift she was mostly somnolent but had become more awake around 19:00 and had her eyes open and was interacting with staff when they would walk into the room by making eye contact, smiling, and laughing  For 20:00 vitals; temp was 99 8 and patient was noted to be flushed and diaphoretic  PRN tylenol given, temp rechecked and now 98 8  No seizure activity witnessed during shift  Side rails remain padded and oxygen and suction set up at bedside for patient safety

## 2018-09-09 NOTE — ASSESSMENT & PLAN NOTE
-increased tube feeds, follow up with nutritional recommendations, I recommend that patient's bolus feeding be changed to continuous feeds, would recommend pleasure feeding only

## 2018-09-09 NOTE — PROGRESS NOTES
Progress Note - Rashawn Card 1980, 41 y o  female MRN: [id number]    Unit/Bed#:  Encounter: [id number]    Primary Care Provider: Ashleigh Cochran DO   Date and time admitted to hospital: 9/6/2018  8:46 PM        * Sepsis due to undetermined organism [facility])   Assessment & Plan      Sepsis was present on admission, secondary to pneumonia, suspected aspiration pneumonia, possible gram-negative pneumonia, discontinue vancomycin     follow-up procalcitonin level, follow blood cultures  Acute encephalopathy   Assessment & Plan      Mental status is improving  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with tonic seizures ([facility] [address] )   Assessment & Plan    -Reports possible seizure while at [facility]   patient was recently transferred to [address] [place] [address] for neurology evaluation, had multiple medications adjusted, continue with same meds, previous discharge from [facility] was reviewed in detail, with close attention to medications and dosing  Dysphagia   Assessment & Plan    -increased tube feeds, follow up with nutritional recommendations, I recommend that patient's bolus feeding be changed to continuous feeds, would recommend pleasure feeding only  Transaminitis   Assessment & Plan    Essentially stable, ammonia level normal           VTE Pharmacologic Prophylaxis:   Pharmacologic: Enoxaparin (Lovenox)  Mechanical VTE Prophylaxis in Place: Yes    Patient Centered Rounds: I have performed bedside rounds with nursing staff today      Current Length of Stay: 2 day(s)    Current Patient Status: Inpatient   Certification Statement: The patient will continue to require additional inpatient hospital stay due to Patient requires continued IV antibiotics, daily labs including procalcitonin    Discharge Plan / Estimated Discharge Date:   9/11/2018      Code Status: Level 1 - Full Code      Subjective:   No pain, patient is more interactive with regards to responding to verbal stimuli    Objective: Vitals:   Temp (24hrs), Av 4 °F (36 9 °C), Min:96 5 °F (35 8 °C), Max:99 8 °F (37 7 °C)    HR:  [] 78  Resp:  [11-31] 20  BP: ()/(55-74) 89/61  SpO2:  [94 %-98 %] 97 %  Body mass index is 23 23 kg/m²  Input and Output Summary (last 24 hours): Intake/Output Summary (Last 24 hours) at 18 0854  Last data filed at 18 0610   Gross per 24 hour   Intake          3503 75 ml   Output              500 ml   Net          3003 75 ml       Physical Exam:     Physical Exam   Constitutional: She appears well-developed and well-nourished  No distress  Cardiovascular: Normal rate  No murmur heard  Pulmonary/Chest: Effort normal and breath sounds normal  No respiratory distress  She has no wheezes  Abdominal: Soft  Bowel sounds are normal  She exhibits no distension  There is no tenderness  Musculoskeletal:   Improved muscle tone today   Neurological: She is alert  Skin: She is not diaphoretic  Nursing note and vitals reviewed  Additional Data:     Labs:      Results from last 7 days  Lab Units 18  0456   WBC Thousand/uL 14 25*   HEMOGLOBIN g/dL 12 0   HEMATOCRIT % 36 0   PLATELETS Thousands/uL 148*   NEUTROS PCT % 64   LYMPHS PCT % 24   MONOS PCT % 8   EOS PCT % 3       Results from last 7 days  Lab Units 18  0456   SODIUM mmol/L 140   POTASSIUM mmol/L 3 6   CHLORIDE mmol/L 107   CO2 mmol/L 24   BUN mg/dL 4*   CREATININE mg/dL 0 38*   CALCIUM mg/dL 8 0*   ALK PHOS U/L 119*   ALT U/L 69   AST U/L 58*       Results from last 7 days  Lab Units 18  0431   INR  1 41*       * I Have Reviewed All Lab Data Listed Above  * Additional Pertinent Lab Tests Reviewed: [facility] Admission Reviewed        Recent Cultures (last 7 days):       Results from last 7 days  Lab Units 18  0204 18  0153   BLOOD CULTURE  No Growth at 24 hrs  No Growth at 24 hrs         Last 24 Hours Medication List:     Current Facility-Administered Medications:  acetaminophen 650 mg Rectal Q4H PRN Олег Wang PA-C    enoxaparin 40 mg Subcutaneous Q24H [address] Tristan Harvey, DO    lactulose 30 g Per G Tube TID Олег Wang PA-C    levETIRAcetam 1,000 mg Per G Tube Q12H [address] Олег Wang PA-C    levOCARNitine 750 mg Per G Tube TID Олег Wang PA-C    levofloxacin 750 mg Intravenous Q24H Richmond Leos, DO Last Rate: Stopped (09/08/18 1800)   LORazepam 1 mg Intravenous Q4H PRN Олег Wang PA-C    magnesium hydroxide 30 mL Oral Daily PRN Олег Wang PA-C    melatonin 6 mg Per G Tube HS Олег Wang PA-C    metroNIDAZOLE 500 mg Intravenous Q8H Richmond Leos, DO Last Rate: Stopped (09/09/18 0610)   ondansetron 4 mg Intravenous Q6H PRN Олег Wang PA-C    perampanel 8 mg Per NG Tube HS Олег Wang PA-C    PHENobarbital 80 mg Per G Tube HS Richmond Leos,     potassium chloride 20 mEq Per G Tube Daily Олег Wang PA-C    rufinamide 1,200 mg Per G Tube BID Олег Wang PA-C    saccharomyces boulardii 250 mg Per G Tube Daily Олег Wang PA-C    senna-docusate sodium 1 tablet Per G Tube Daily Олег Wang PA-C    sodium chloride 0 9 % with KCl 20 mEq/L 75 mL/hr Intravenous Continuous Richmond Leos,  Last Rate: 75 mL/hr (09/09/18 0610)   topiramate 200 mg Per G Tube Q12H [address] Richmond Leos,     valproic acid 250 mg Per G Tube Q8H Олег Wang PA-C         Today, Patient Was Seen By: Richmond Leos DO

## 2018-09-09 NOTE — NURSING NOTE
Unable to tell if pt has been voiding throughout shift  Pt has been incontinent of large amounts of yellow watery/loose BMs  Bladder scan was 459 mL  Kana Johnson PA-C notified and new order received to straight cath pt  Pt was straight cathed for 500 mL @ 05:42

## 2018-09-09 NOTE — PLAN OF CARE
Problem: Potential for Falls  Goal: Patient will remain free of falls  INTERVENTIONS:  - Assess patient frequently for physical needs  -  Identify cognitive and physical deficits and behaviors that affect risk of falls    -  [place] fall precautions as indicated by assessment   - Educate patient/family on patient safety including physical limitations  - Instruct patient to call for assistance with activity based on assessment  - Modify environment to reduce risk of injury  - Consider OT/PT consult to assist with strengthening/mobility    Outcome: Progressing      Problem: Prexisting or High Potential for Compromised Skin Integrity  Goal: Skin integrity is maintained or improved  INTERVENTIONS:  - Identify patients at risk for skin breakdown  - Assess and monitor skin integrity  - Assess and monitor nutrition and hydration status  - Monitor labs (i e  albumin)  - Assess for incontinence   - Turn and reposition patient  - Assist with mobility/ambulation  - Relieve pressure over bony prominences  - Avoid friction and shearing  - Provide appropriate hygiene as needed including keeping skin clean and dry  - Evaluate need for skin moisturizer/barrier cream  - Collaborate with interdisciplinary team (i e  Nutrition, Rehabilitation, etc )   - Patient/family teaching   Outcome: Progressing      Problem: PAIN - ADULT  Goal: Verbalizes/displays adequate comfort level or baseline comfort level  Interventions:  - Encourage patient to monitor pain and request assistance  - Assess pain using appropriate pain scale  - Administer analgesics based on type and severity of pain and evaluate response  - Implement non-pharmacological measures as appropriate and evaluate response  - Consider cultural and social influences on pain and pain management  - Notify physician/advanced practitioner if interventions unsuccessful or patient reports new pain   Outcome: Progressing      Problem: INFECTION - ADULT  Goal: Absence or prevention of progression during hospitalization  INTERVENTIONS:  - Assess and monitor for signs and symptoms of infection  - Monitor lab/diagnostic results  - Monitor all insertion sites, i e  indwelling lines, tubes, and drains  - Monitor endotracheal (as able) and nasal secretions for changes in amount and color  - [place] appropriate cooling/warming therapies per order  - Administer medications as ordered  - Instruct and encourage patient and family to use good hand hygiene technique  - Identify and instruct in appropriate isolation precautions for identified infection/condition   Outcome: Progressing      Problem: SAFETY ADULT  Goal: Maintain or return to baseline ADL function  INTERVENTIONS:  -  Assess patient's ability to carry out ADLs; assess patient's baseline for ADL function and identify physical deficits which impact ability to perform ADLs (bathing, care of mouth/teeth, toileting, grooming, dressing, etc )  - Assess/evaluate cause of self-care deficits   - Assess range of motion  - Assess patient's mobility; develop plan if impaired  - Assess patient's need for assistive devices and provide as appropriate  - Encourage maximum independence but intervene and supervise when necessary  ¯ Involve family in performance of ADLs  ¯ Assess for home care needs following discharge   ¯ Request OT consult to assist with ADL evaluation and planning for discharge  ¯ Provide patient education as appropriate   Outcome: Progressing    Goal: Maintain or return mobility status to optimal level  INTERVENTIONS:  - Assess patient's baseline mobility status (ambulation, transfers, stairs, etc )    - Identify cognitive and physical deficits and behaviors that affect mobility  - Identify mobility aids required to assist with transfers and/or ambulation (gait belt, sit-to-stand, lift, walker, cane, etc )  - [place] fall precautions as indicated by assessment  - Record patient progress and toleration of activity level on Mobility SBAR; progress patient to next Phase/Stage  - Instruct patient to call for assistance with activity based on assessment  - Request Rehabilitation consult to assist with strengthening/weightbearing, etc    Outcome: Progressing    Goal: Patient will remain free of falls  INTERVENTIONS:  - Assess patient frequently for physical needs  -  Identify cognitive and physical deficits and behaviors that affect risk of falls  -  [place] fall precautions as indicated by assessment   - Educate patient/family on patient safety including physical limitations  - Instruct patient to call for assistance with activity based on assessment  - Modify environment to reduce risk of injury  - Consider OT/PT consult to assist with strengthening/mobility    Outcome: Progressing      Problem: DISCHARGE PLANNING  Goal: Discharge to home or other facility with appropriate resources  INTERVENTIONS:  - Identify barriers to discharge w/patient and caregiver  - Arrange for needed discharge resources and transportation as appropriate  - Identify discharge learning needs (meds, wound care, etc )  - Arrange for interpretive services to assist at discharge as needed  - Refer to Case Management Department for coordinating discharge planning if the patient needs post-hospital services based on physician/advanced practitioner order or complex needs related to functional status, cognitive ability, or social support system   Outcome: Progressing      Problem: Knowledge Deficit  Goal: Patient/family/caregiver demonstrates understanding of disease process, treatment plan, medications, and discharge instructions  Complete learning assessment and assess knowledge base    Interventions:  - Provide teaching at level of understanding  - Provide teaching via preferred learning methods   Outcome: Progressing      Problem: Nutrition/Hydration-ADULT  Goal: Nutrient/Hydration intake appropriate for improving, restoring or maintaining nutritional needs  Monitor and assess patient's nutrition/hydration status for malnutrition (ex- brittle hair, bruises, dry skin, pale skin and conjunctiva, muscle wasting, smooth red tongue, and disorientation)  Collaborate with interdisciplinary team and initiate plan and interventions as ordered  Monitor patient's weight and dietary intake as ordered or per policy  Utilize nutrition screening tool and intervene per policy  Determine patient's food preferences and provide high-protein, high-caloric foods as appropriate       INTERVENTIONS:  - Monitor oral intake, urinary output, labs, and treatment plans  - Assess nutrition and hydration status and recommend course of action  - Evaluate amount of meals eaten  - Assist patient with eating if necessary   - Allow adequate time for meals  - Recommend/ encourage appropriate diets, oral nutritional supplements, and vitamin/mineral supplements  - Order, calculate, and assess calorie counts as needed  - Recommend, monitor, and adjust tube feedings and TPN/PPN based on assessed needs  - Assess need for intravenous fluids  - Provide specific nutrition/hydration education as appropriate  - Include patient/family/caregiver in decisions related to nutrition   Outcome: Progressing      Problem: DISCHARGE PLANNING - CARE MANAGEMENT  Goal: Discharge to post-acute care or home with appropriate resources  INTERVENTIONS:  - Conduct assessment to determine patient/family and health care team treatment goals, and need for post-acute services based on payer coverage, community resources, and patient preferences, and barriers to discharge  - Address psychosocial, clinical, and financial barriers to discharge as identified in assessment in conjunction with the patient/family and health care team  - Arrange appropriate level of post-acute services according to patient's   needs and preference and payer coverage in collaboration with the physician and health care team  - Communicate with and update the patient/family, physician, and health care team regarding progress on the discharge plan  - Arrange appropriate transportation to post-acute venues   Outcome: Progressing

## 2018-09-10 LAB
ALBUMIN SERPL BCP-MCNC: 2.3 G/DL (ref 3.5–5)
ALP SERPL-CCNC: 144 U/L (ref 46–116)
ALT SERPL W P-5'-P-CCNC: 79 U/L (ref 12–78)
AMMONIA PLAS-SCNC: 45 UMOL/L (ref 11–35)
ANION GAP SERPL CALCULATED.3IONS-SCNC: 9 MMOL/L (ref 4–13)
AST SERPL W P-5'-P-CCNC: 78 U/L (ref 5–45)
BASOPHILS # BLD AUTO: 0.06 THOUSANDS/ΜL (ref 0–0.1)
BASOPHILS NFR BLD AUTO: 1 % (ref 0–1)
BILIRUB SERPL-MCNC: 0.6 MG/DL (ref 0.2–1)
BUN SERPL-MCNC: 4 MG/DL (ref 5–25)
CALCIUM SERPL-MCNC: 8.5 MG/DL (ref 8.3–10.1)
CHLORIDE SERPL-SCNC: 106 MMOL/L (ref 100–108)
CO2 SERPL-SCNC: 25 MMOL/L (ref 21–32)
CREAT SERPL-MCNC: 0.36 MG/DL (ref 0.6–1.3)
EOSINOPHIL # BLD AUTO: 0.61 THOUSAND/ΜL (ref 0–0.61)
EOSINOPHIL NFR BLD AUTO: 6 % (ref 0–6)
ERYTHROCYTE [DISTWIDTH] IN BLOOD BY AUTOMATED COUNT: 13.5 % (ref 11.6–15.1)
GFR SERPL CREATININE-BSD FRML MDRD: 138 ML/MIN/1.73SQ M
GLUCOSE SERPL-MCNC: 124 MG/DL (ref 65–140)
HCT VFR BLD AUTO: 40.6 % (ref 34.8–46.1)
HGB BLD-MCNC: 13.5 G/DL (ref 11.5–15.4)
IMM GRANULOCYTES # BLD AUTO: 0.05 THOUSAND/UL (ref 0–0.2)
IMM GRANULOCYTES NFR BLD AUTO: 1 % (ref 0–2)
INR PPP: 1.31 (ref 0.86–1.17)
LEVETIRACETAM SERPL-MCNC: 51.4 UG/ML (ref 10–40)
LYMPHOCYTES # BLD AUTO: 2.56 THOUSANDS/ΜL (ref 0.6–4.47)
LYMPHOCYTES NFR BLD AUTO: 25 % (ref 14–44)
MAGNESIUM SERPL-MCNC: 1.8 MG/DL (ref 1.6–2.6)
MCH RBC QN AUTO: 33.7 PG (ref 26.8–34.3)
MCHC RBC AUTO-ENTMCNC: 33.3 G/DL (ref 31.4–37.4)
MCV RBC AUTO: 101 FL (ref 82–98)
MONOCYTES # BLD AUTO: 0.91 THOUSAND/ΜL (ref 0.17–1.22)
MONOCYTES NFR BLD AUTO: 9 % (ref 4–12)
NEUTROPHILS # BLD AUTO: 5.89 THOUSANDS/ΜL (ref 1.85–7.62)
NEUTS SEG NFR BLD AUTO: 58 % (ref 43–75)
NRBC BLD AUTO-RTO: 0 /100 WBCS
PHOSPHATE SERPL-MCNC: 4.1 MG/DL (ref 2.7–4.5)
PLATELET # BLD AUTO: 171 THOUSANDS/UL (ref 149–390)
PMV BLD AUTO: 12 FL (ref 8.9–12.7)
POTASSIUM SERPL-SCNC: 4.2 MMOL/L (ref 3.5–5.3)
PROCALCITONIN SERPL-MCNC: 0.48 NG/ML
PROT SERPL-MCNC: 6.2 G/DL (ref 6.4–8.2)
PROTHROMBIN TIME: 15.7 SECONDS (ref 11.8–14.2)
RBC # BLD AUTO: 4.01 MILLION/UL (ref 3.81–5.12)
SODIUM SERPL-SCNC: 140 MMOL/L (ref 136–145)
TOPIRAMATE SERPL-MCNC: 9.8 UG/ML (ref 2–25)
WBC # BLD AUTO: 10.08 THOUSAND/UL (ref 4.31–10.16)

## 2018-09-10 PROCEDURE — 99232 SBSQ HOSP IP/OBS MODERATE 35: CPT | Performed by: INTERNAL MEDICINE

## 2018-09-10 PROCEDURE — 82140 ASSAY OF AMMONIA: CPT | Performed by: PHYSICIAN ASSISTANT

## 2018-09-10 PROCEDURE — 85025 COMPLETE CBC W/AUTO DIFF WBC: CPT | Performed by: INTERNAL MEDICINE

## 2018-09-10 PROCEDURE — 85610 PROTHROMBIN TIME: CPT | Performed by: INTERNAL MEDICINE

## 2018-09-10 PROCEDURE — 84145 PROCALCITONIN (PCT): CPT | Performed by: INTERNAL MEDICINE

## 2018-09-10 PROCEDURE — 80053 COMPREHEN METABOLIC PANEL: CPT | Performed by: INTERNAL MEDICINE

## 2018-09-10 PROCEDURE — 84100 ASSAY OF PHOSPHORUS: CPT | Performed by: INTERNAL MEDICINE

## 2018-09-10 PROCEDURE — 83735 ASSAY OF MAGNESIUM: CPT | Performed by: INTERNAL MEDICINE

## 2018-09-10 RX ORDER — LEVOFLOXACIN 750 MG/1
750 TABLET ORAL EVERY 24 HOURS
Status: DISCONTINUED | OUTPATIENT
Start: 2018-09-10 | End: 2018-09-12 | Stop reason: HOSPADM

## 2018-09-10 RX ORDER — METRONIDAZOLE 500 MG/1
500 TABLET ORAL EVERY 8 HOURS SCHEDULED
Status: DISCONTINUED | OUTPATIENT
Start: 2018-09-10 | End: 2018-09-12 | Stop reason: HOSPADM

## 2018-09-10 RX ADMIN — LEVETIRACETAM 1000 MG: 100 SOLUTION ORAL at 21:48

## 2018-09-10 RX ADMIN — TOPIRAMATE 200 MG: 100 TABLET, FILM COATED ORAL at 21:42

## 2018-09-10 RX ADMIN — Medication 1 TABLET: at 09:37

## 2018-09-10 RX ADMIN — METRONIDAZOLE 500 MG: 500 INJECTION, SOLUTION INTRAVENOUS at 05:18

## 2018-09-10 RX ADMIN — RUFINAMIDE 1200 MG: 200 TABLET, FILM COATED ORAL at 09:28

## 2018-09-10 RX ADMIN — VALPROIC ACID 250 MG: 500 SOLUTION ORAL at 10:30

## 2018-09-10 RX ADMIN — LEVOFLOXACIN 750 MG: 750 TABLET, FILM COATED ORAL at 17:25

## 2018-09-10 RX ADMIN — LEVOCARNITINE 750 MG: 1 SOLUTION ORAL at 09:26

## 2018-09-10 RX ADMIN — VALPROIC ACID 250 MG: 500 SOLUTION ORAL at 02:17

## 2018-09-10 RX ADMIN — PHENOBARBITAL 80 MG: 20 LIQUID ORAL at 21:43

## 2018-09-10 RX ADMIN — METRONIDAZOLE 500 MG: 500 TABLET ORAL at 21:41

## 2018-09-10 RX ADMIN — LEVETIRACETAM 1000 MG: 100 SOLUTION ORAL at 09:26

## 2018-09-10 RX ADMIN — LACTULOSE 30 G: 10 SOLUTION ORAL at 16:27

## 2018-09-10 RX ADMIN — VALPROIC ACID 250 MG: 500 SOLUTION ORAL at 17:44

## 2018-09-10 RX ADMIN — METRONIDAZOLE 500 MG: 500 TABLET ORAL at 16:26

## 2018-09-10 RX ADMIN — LEVOCARNITINE 750 MG: 1 SOLUTION ORAL at 16:45

## 2018-09-10 RX ADMIN — RUFINAMIDE 1200 MG: 200 TABLET, FILM COATED ORAL at 17:40

## 2018-09-10 RX ADMIN — TOPIRAMATE 200 MG: 100 TABLET, FILM COATED ORAL at 09:37

## 2018-09-10 RX ADMIN — LACTULOSE 30 G: 10 SOLUTION ORAL at 21:49

## 2018-09-10 RX ADMIN — ENOXAPARIN SODIUM 40 MG: 40 INJECTION, SOLUTION INTRAVENOUS; SUBCUTANEOUS at 09:25

## 2018-09-10 RX ADMIN — MELATONIN TAB 3 MG 6 MG: 3 TAB at 21:41

## 2018-09-10 RX ADMIN — Medication 250 MG: at 09:27

## 2018-09-10 RX ADMIN — LACTULOSE 30 G: 10 SOLUTION ORAL at 09:25

## 2018-09-10 RX ADMIN — LEVOCARNITINE 750 MG: 1 SOLUTION ORAL at 21:49

## 2018-09-10 RX ADMIN — POTASSIUM CHLORIDE 20 MEQ: 20 SOLUTION ORAL at 09:26

## 2018-09-10 NOTE — CONSULTS
Consulted for TF adjustments  If adjusting to continuous feed, recommend Jevity 1 2 @ goal rate of 50ml/hr with 50ml flush q4h to provide 1440kcal, 67g protein, 1272ml total fluid  Nutrition services will continue to monitor

## 2018-09-10 NOTE — SOCIAL WORK
The patient is not medically ready today   Following the patients blood cultures and procalcitonin levels

## 2018-09-10 NOTE — ASSESSMENT & PLAN NOTE
-Rep[facility] at [facility]   patient was recently transferred to [facility] for neurology evaluation, had multiple medications adjusted, continue with same meds, previous discharge from [facility] was reviewed in detail, with close attention to medications and dosing

## 2018-09-10 NOTE — ASSESSMENT & PLAN NOTE
Sepsis was present on admission, secondary to pneumonia, suspected aspiration pneumonia, possible gram-negative pneumonia, pt improving off vanco     follow-up procalcitonin level, follow blood cultures      Change to LEvaquin and flagyl via PEG today

## 2018-09-10 NOTE — PROGRESS NOTES
Progress Note - Imagene Gravelias 1980, 41 y o  female MRN: [id number]    Unit/Bed#:  Encounter: [id number]    Primary Care Provider: Claudio Dumont DO   Date and time admitted to hospital: 9/6/2018  8:46 PM        * Sepsis due to undetermined organism [facility])   Assessment & Plan      Sepsis was present on admission, secondary to pneumonia, suspected aspiration pneumonia, possible gram-negative pneumonia, pt improving off vanco     follow-up procalcitonin level, follow blood cultures  Change to LEvaquin and flagyl via PEG today        Acute encephalopathy   Assessment & Plan      Mental status is improving  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with tonic seizures ([facility] [address] )   Assessment & Plan    -Reports possible seizure while at [facility]   patient was recently transferred to [address] [place] [address] for neurology evaluation, had multiple medications adjusted, continue with same meds, previous discharge from [facility] was reviewed in detail, with close attention to medications and dosing  Dysphagia   Assessment & Plan    -increased tube feeds, follow up with nutritional recommendations, I recommend that patient's bolus feeding be changed to continuous feeds, would recommend pleasure feeding only for at least 1 month        Transaminitis   Assessment & Plan    Essentially stable, ammonia trending up due to nursing holding lactulose yesterday            VTE Pharmacologic Prophylaxis:   Pharmacologic: Enoxaparin (Lovenox)  Mechanical VTE Prophylaxis in Place: Yes    Patient Centered Rounds: I have performed bedside rounds with nursing staff today      Current Length of Stay: 3 day(s)    Current Patient Status: Inpatient   Certification Statement: The patient will continue to require additional inpatient hospital stay due to Follow up procalcitonin and nutrition consult    Discharge Plan / Estimated Discharge Date: 9/11 or 9/12      Code Status: Level 1 - Full Code      Subjective:   No pain    Objective:     Vitals:   Temp (24hrs), Av °F (36 7 °C), Min:97 1 °F (36 2 °C), Max:99 5 °F (37 5 °C)    HR:  [73-95] 83  Resp:  [12-28] 21  BP: ()/(52-76) 102/68  SpO2:  [94 %-98 %] 96 %  Body mass index is 23 09 kg/m²  Input and Output Summary (last 24 hours): Intake/Output Summary (Last 24 hours) at 09/10/18 0950  Last data filed at 09/10/18 0800   Gross per 24 hour   Intake           2892 5 ml   Output                0 ml   Net           2892 5 ml       Physical Exam:     Physical Exam   Constitutional: No distress  HENT:   Head: Normocephalic  Cardiovascular: Normal rate  Pulmonary/Chest: Effort normal and breath sounds normal  No respiratory distress  She has no wheezes  Abdominal: Soft  Bowel sounds are normal  She exhibits no distension  There is no tenderness  Skin: Skin is warm and dry  No rash noted  She is not diaphoretic  No erythema  Nursing note and vitals reviewed  Additional Data:     Labs:      Results from last 7 days  Lab Units 09/10/18  0457   WBC Thousand/uL 10 08   HEMOGLOBIN g/dL 13 5   HEMATOCRIT % 40 6   PLATELETS Thousands/uL 171   NEUTROS PCT % 58   LYMPHS PCT % 25   MONOS PCT % 9   EOS PCT % 6       Results from last 7 days  Lab Units 09/10/18  0457   SODIUM mmol/L 140   POTASSIUM mmol/L 4 2   CHLORIDE mmol/L 106   CO2 mmol/L 25   BUN mg/dL 4*   CREATININE mg/dL 0 36*   CALCIUM mg/dL 8 5   ALK PHOS U/L 144*   ALT U/L 79*   AST U/L 78*       Results from last 7 days  Lab Units 09/10/18  0457   INR  1 31*       * I Have Reviewed All Lab Data Listed Above  * Additional Pertinent Lab Tests Reviewed: [facility]        Recent Cultures (last 7 days):       Results from last 7 days  Lab Units 18  0204 18  0153   BLOOD CULTURE  No Growth at 48 hrs  No Growth at 48 hrs         Last 24 Hours Medication List:     Current Facility-Administered Medications:  acetaminophen 650 mg Rectal Q4H PRN Олег Wang PA-C   enoxaparin 40 mg Subcutaneous Q24H [address] Richmond Leos, DO   lactulose 30 g Per G Tube TID Олег Wang, PA-C   levETIRAcetam 1,000 mg Per G Tube Q12H [address] Олег Wang, PA-C   levOCARNitine 750 mg Per G Tube TID Олег Wang, PA-C   levofloxacin 750 mg Per G Tube Q24H Richmond Leos, DO   LORazepam 1 mg Intravenous Q4H PRN Олег Wang, PA-C   magnesium hydroxide 30 mL Oral Daily PRN Олег Wang, PA-C   melatonin 6 mg Per G Tube HS Олег Wang, PA-C   metroNIDAZOLE 500 mg Per PEG Tube Q8H [address] Tristan Harvey, DO   ondansetron 4 mg Intravenous Q6H PRN Олег Wang, PA-C   perampanel 8 mg Per NG Tube HS Олег Wang, PA-C   PHENobarbital 80 mg Per G Tube HS Richmond Leos, DO   potassium chloride 20 mEq Per G Tube Daily Олег Wang, PA-C   rufinamide 1,200 mg Per G Tube BID Олег Wang, PA-C   saccharomyces boulardii 250 mg Per G Tube Daily Олег Wang, PA-C   senna-docusate sodium 1 tablet Per G Tube Daily Олег Wang, PA-C   topiramate 200 mg Per G Tube Q12H [address] Richmond Leos, DO   valproic acid 250 mg Per G Tube Q8H Олег Wang, PAEtienneC        Today, Patient Was Seen By: Richmond Leos, DO

## 2018-09-10 NOTE — ASSESSMENT & PLAN NOTE
-increased tube feeds, follow up with nutritional recommendations, I recommend that patient's bolus feeding be changed to continuous feeds, would recommend pleasure feeding only for at least 1 month

## 2018-09-11 LAB
ALBUMIN SERPL BCP-MCNC: 2.2 G/DL (ref 3.5–5)
ALP SERPL-CCNC: 143 U/L (ref 46–116)
ALT SERPL W P-5'-P-CCNC: 88 U/L (ref 12–78)
ANION GAP SERPL CALCULATED.3IONS-SCNC: 9 MMOL/L (ref 4–13)
AST SERPL W P-5'-P-CCNC: 81 U/L (ref 5–45)
BASOPHILS # BLD AUTO: 0.03 THOUSANDS/ΜL (ref 0–0.1)
BASOPHILS NFR BLD AUTO: 0 % (ref 0–1)
BILIRUB SERPL-MCNC: 0.5 MG/DL (ref 0.2–1)
BUN SERPL-MCNC: 10 MG/DL (ref 5–25)
CALCIUM SERPL-MCNC: 8.2 MG/DL (ref 8.3–10.1)
CHLORIDE SERPL-SCNC: 109 MMOL/L (ref 100–108)
CO2 SERPL-SCNC: 24 MMOL/L (ref 21–32)
CREAT SERPL-MCNC: 0.41 MG/DL (ref 0.6–1.3)
EOSINOPHIL # BLD AUTO: 0.22 THOUSAND/ΜL (ref 0–0.61)
EOSINOPHIL NFR BLD AUTO: 2 % (ref 0–6)
ERYTHROCYTE [DISTWIDTH] IN BLOOD BY AUTOMATED COUNT: 14 % (ref 11.6–15.1)
GFR SERPL CREATININE-BSD FRML MDRD: 132 ML/MIN/1.73SQ M
GLUCOSE SERPL-MCNC: 112 MG/DL (ref 65–140)
HCT VFR BLD AUTO: 39.5 % (ref 34.8–46.1)
HGB BLD-MCNC: 13.1 G/DL (ref 11.5–15.4)
IMM GRANULOCYTES # BLD AUTO: 0.04 THOUSAND/UL (ref 0–0.2)
IMM GRANULOCYTES NFR BLD AUTO: 0 % (ref 0–2)
INR PPP: 1.3 (ref 0.86–1.17)
LYMPHOCYTES # BLD AUTO: 2.01 THOUSANDS/ΜL (ref 0.6–4.47)
LYMPHOCYTES NFR BLD AUTO: 20 % (ref 14–44)
MAGNESIUM SERPL-MCNC: 2 MG/DL (ref 1.6–2.6)
MCH RBC QN AUTO: 33.6 PG (ref 26.8–34.3)
MCHC RBC AUTO-ENTMCNC: 33.2 G/DL (ref 31.4–37.4)
MCV RBC AUTO: 101 FL (ref 82–98)
MONOCYTES # BLD AUTO: 1.14 THOUSAND/ΜL (ref 0.17–1.22)
MONOCYTES NFR BLD AUTO: 12 % (ref 4–12)
NEUTROPHILS # BLD AUTO: 6.5 THOUSANDS/ΜL (ref 1.85–7.62)
NEUTS SEG NFR BLD AUTO: 66 % (ref 43–75)
NRBC BLD AUTO-RTO: 0 /100 WBCS
PHOSPHATE SERPL-MCNC: 4.3 MG/DL (ref 2.7–4.5)
PLATELET # BLD AUTO: 185 THOUSANDS/UL (ref 149–390)
PMV BLD AUTO: 12.3 FL (ref 8.9–12.7)
POTASSIUM SERPL-SCNC: 4 MMOL/L (ref 3.5–5.3)
PROCALCITONIN SERPL-MCNC: 0.33 NG/ML
PROT SERPL-MCNC: 6 G/DL (ref 6.4–8.2)
PROTHROMBIN TIME: 15.6 SECONDS (ref 11.8–14.2)
RBC # BLD AUTO: 3.9 MILLION/UL (ref 3.81–5.12)
SODIUM SERPL-SCNC: 142 MMOL/L (ref 136–145)
WBC # BLD AUTO: 9.94 THOUSAND/UL (ref 4.31–10.16)

## 2018-09-11 PROCEDURE — 84100 ASSAY OF PHOSPHORUS: CPT | Performed by: INTERNAL MEDICINE

## 2018-09-11 PROCEDURE — 84145 PROCALCITONIN (PCT): CPT | Performed by: INTERNAL MEDICINE

## 2018-09-11 PROCEDURE — 80053 COMPREHEN METABOLIC PANEL: CPT | Performed by: INTERNAL MEDICINE

## 2018-09-11 PROCEDURE — 83735 ASSAY OF MAGNESIUM: CPT | Performed by: INTERNAL MEDICINE

## 2018-09-11 PROCEDURE — 85610 PROTHROMBIN TIME: CPT | Performed by: INTERNAL MEDICINE

## 2018-09-11 PROCEDURE — 99232 SBSQ HOSP IP/OBS MODERATE 35: CPT | Performed by: FAMILY MEDICINE

## 2018-09-11 PROCEDURE — 85025 COMPLETE CBC W/AUTO DIFF WBC: CPT | Performed by: INTERNAL MEDICINE

## 2018-09-11 RX ADMIN — LEVETIRACETAM 1000 MG: 100 SOLUTION ORAL at 10:00

## 2018-09-11 RX ADMIN — ENOXAPARIN SODIUM 40 MG: 40 INJECTION, SOLUTION INTRAVENOUS; SUBCUTANEOUS at 10:00

## 2018-09-11 RX ADMIN — Medication 250 MG: at 10:00

## 2018-09-11 RX ADMIN — PHENOBARBITAL 80 MG: 20 LIQUID ORAL at 21:47

## 2018-09-11 RX ADMIN — LEVOCARNITINE 750 MG: 1 SOLUTION ORAL at 21:20

## 2018-09-11 RX ADMIN — RUFINAMIDE 1200 MG: 200 TABLET, FILM COATED ORAL at 17:01

## 2018-09-11 RX ADMIN — METRONIDAZOLE 500 MG: 500 TABLET ORAL at 21:47

## 2018-09-11 RX ADMIN — LEVOCARNITINE 750 MG: 1 SOLUTION ORAL at 17:00

## 2018-09-11 RX ADMIN — LEVOCARNITINE 750 MG: 1 SOLUTION ORAL at 10:00

## 2018-09-11 RX ADMIN — POTASSIUM CHLORIDE 20 MEQ: 20 SOLUTION ORAL at 10:00

## 2018-09-11 RX ADMIN — RUFINAMIDE 1200 MG: 200 TABLET, FILM COATED ORAL at 10:00

## 2018-09-11 RX ADMIN — TOPIRAMATE 200 MG: 100 TABLET, FILM COATED ORAL at 10:00

## 2018-09-11 RX ADMIN — LACTULOSE 30 G: 10 SOLUTION ORAL at 21:20

## 2018-09-11 RX ADMIN — MELATONIN TAB 3 MG 6 MG: 3 TAB at 21:47

## 2018-09-11 RX ADMIN — SODIUM CHLORIDE 1000 ML: 0.9 INJECTION, SOLUTION INTRAVENOUS at 17:00

## 2018-09-11 RX ADMIN — LEVOFLOXACIN 750 MG: 750 TABLET, FILM COATED ORAL at 21:20

## 2018-09-11 RX ADMIN — VALPROIC ACID 250 MG: 500 SOLUTION ORAL at 10:00

## 2018-09-11 RX ADMIN — TOPIRAMATE 200 MG: 100 TABLET, FILM COATED ORAL at 21:20

## 2018-09-11 RX ADMIN — LEVETIRACETAM 1000 MG: 100 SOLUTION ORAL at 21:21

## 2018-09-11 RX ADMIN — VALPROIC ACID 250 MG: 500 SOLUTION ORAL at 02:05

## 2018-09-11 RX ADMIN — METRONIDAZOLE 500 MG: 500 TABLET ORAL at 06:32

## 2018-09-11 RX ADMIN — METRONIDAZOLE 500 MG: 500 TABLET ORAL at 14:47

## 2018-09-11 RX ADMIN — LACTULOSE 30 G: 10 SOLUTION ORAL at 17:00

## 2018-09-11 RX ADMIN — LACTULOSE 30 G: 10 SOLUTION ORAL at 10:00

## 2018-09-11 NOTE — PROGRESS NOTES
Progress Note - Aroldo Tita 1980, 41 y o  female MRN: [id number]    Unit/Bed#:  Encounter: [id number]    Primary Care Provider: Sierra Ross DO   Date and time admitted to hospital: 9/6/2018  8:46 PM        Transaminitis   Assessment & Plan    Essentially stable and chronic  Continue to monitor   Lactulose resumed yesterday         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with tonic seizures ([address] )   Assessment & Plan    Reports possible seizure while at [facility]  patient was recently transferred to [address] [place] [address] for neurology evaluation, had multiple medications adjusted, continue with same meds, previous discharge from [facility]         Dysphagia   Assessment & Plan    Tube feedings increased and will be changed to continuous feeds on discharge          * Sepsis due to undetermined organism [facility])   Assessment & Plan    Sepsis was present on admission, secondary to pneumonia, suspected aspiration pneumonia, possible gram-negative pneumonia,   Follow-up procalcitonin level from today  Continue PO levaquin and PO flagyl   Sepsis has resolved             Progress Note - Aroldo Tita 41 y o  female MRN: [id number]    Unit/Bed#:  Encounter: [id number]        Subjective:     Seen and examined @ bedside  Chart reviewed   No acute events overnight     Objective:     Vitals:   Vitals:    09/11/18 0800   BP: 96/64   Pulse: 92   Resp: (!) 25   Temp: 97 5 °F (36 4 °C)   SpO2: 96%     Body mass index is 22 66 kg/m²      Intake/Output Summary (Last 24 hours) at 09/11/18 1038  Last data filed at 09/11/18 0801   Gross per 24 hour   Intake             1396 ml   Output                0 ml   Net             1396 ml       Physical Exam:   BP 96/64 (BP Location: Right arm)   Pulse 92   Temp 97 5 °F (36 4 °C) (Temporal)   Resp (!) 25   Ht 4' 9" (1 448 m)   Wt 47 5 kg (104 lb 11 5 oz)   LMP  (LMP Unknown)   SpO2 96%   BMI 22 66 kg/m²   General appearance: awake, at baseline  Head: Normocephalic, without obvious abnormality, atraumatic  Lungs: clear to auscultation bilaterally  Heart: regular rate and rhythm, S1, S2 normal, no murmur, click, rub or gallop  Abdomen: soft, non-tender; bowel sounds normal; no masses,  no organomegaly  Extremities: extremities normal, warm and well-perfused; no cyanosis, clubbing, or edema  Pulses: 2+ and symmetric  Skin: no rashes      Invasive Devices     Peripheral Intravenous Line            Peripheral IV 09/08/18 Right Wrist 3 days    Peripheral IV 09/10/18 Left Antecubital 1 day          Drain            Gastrostomy/Enterostomy Gastrostomy 18 Fr    days          Nasogastric/Orogastric Tube            Feeding Tube 586 days                  Results from last 7 days  Lab Units 09/11/18  0451 09/10/18  0457 09/09/18  0456   WBC Thousand/uL 9 94 10 08 14 25*   HEMOGLOBIN g/dL 13 1 13 5 12 0   HEMATOCRIT % 39 5 40 6 36 0   PLATELETS Thousands/uL 185 171 148*         Results from last 7 days  Lab Units 09/11/18  0451 09/10/18  0457 09/09/18  0456   SODIUM mmol/L 142 140 140   POTASSIUM mmol/L 4 0 4 2 3 6   CHLORIDE mmol/L 109* 106 107   CO2 mmol/L 24 25 24   BUN mg/dL 10 4* 4*   CREATININE mg/dL 0 41* 0 36* 0 38*   CALCIUM mg/dL 8 2* 8 5 8 0*   ALK PHOS U/L 143* 144* 119*   ALT U/L 88* 79* 69   AST U/L 81* 78* 58*       Medication Administration - last 24 hours from 09/10/2018 1038 to 09/11/2018 1038       Date/Time Order Dose Route Action Action by     09/11/2018 1000 lactulose 20 g/30 mL oral solution 30 g 30 g Per G Tube Given Rehana Borjas RN     09/10/2018 2149 lactulose 20 g/30 mL oral solution 30 g 30 g Per G Tube Given Govind Duncan RN     09/10/2018 1627 lactulose 20 g/30 mL oral solution 30 g 30 g Per G Tube Given Chloe Shine RN     09/11/2018 1000 levETIRAcetam (KEPPRA) oral solution 1,000 mg 1,000 mg Per G Tube Given Rehana Borjas RN     09/10/2018 2148 levETIRAcetam (KEPPRA) oral solution 1,000 mg 1,000 mg Per G Tube Given Govind Duncan RN     09/10/2018 2141 melatonin tablet 6 mg 6 mg Per G Tube Given Reche Ponds, RN     09/10/2018 2136 perampanel (FYCOMPA) oral suspension 8 mg 8 mg Per NG Tube Not Given Reche Ponds, RN     09/11/2018 1000 potassium chloride 10 % oral solution 20 mEq 20 mEq Per G Tube Given Lenward Brace, RN     09/11/2018 1000 saccharomyces boulardii (FLORASTOR) capsule 250 mg 250 mg Per G Tube Given Lenward Brace, RN     09/11/2018 1028 senna-docusate sodium (SENOKOT S) 8 6-50 mg per tablet 1 tablet 1 tablet Per G Tube Not Given Lenward Brace, RN     09/11/2018 1000 valproic acid (DEPAKENE) 250 MG/5ML oral soln 250 mg 250 mg Per G Tube Given Lenward Brace, RN     09/11/2018 0205 valproic acid (DEPAKENE) 250 MG/5ML oral soln 250 mg 250 mg Per G Tube Given Reche Ponds, RN     09/10/2018 1744 valproic acid (DEPAKENE) 250 MG/5ML oral soln 250 mg 250 mg Per G Tube Given Skippy Darcy, RN     09/11/2018 1000 levOCARNitine (CARNITOR) oral solution 750 mg 750 mg Per G Tube Given Lenward Brace, RN     09/10/2018 2149 levOCARNitine (CARNITOR) oral solution 750 mg 750 mg Per G Tube Given Reche Ponds, RN     09/10/2018 1645 levOCARNitine (CARNITOR) oral solution 750 mg 750 mg Per G Tube Given Skippy Darcy, RN     09/11/2018 1000 rufinamide (BANZEL) tablet 1,200 mg 1,200 mg Per G Tube Given Lenward Brace, RN     09/10/2018 1740 rufinamide (BANZEL) tablet 1,200 mg 1,200 mg Per G Tube Given Skippy Darcy, RN     09/11/2018 1000 topiramate (TOPAMAX) tablet 200 mg 200 mg Per G Tube Given Lenward Brace, RN     09/10/2018 2142 topiramate (TOPAMAX) tablet 200 mg 200 mg Per G Tube Given Reche Ponds, RN     09/10/2018 2143 PHENobarbital oral elixir 80 mg 80 mg Per G Tube Given Reche Ponds, RN     09/11/2018 1000 enoxaparin (LOVENOX) subcutaneous injection 40 mg 40 mg Subcutaneous Given Mary Phillips RN     09/10/2018 1725 levofloxacin (LEVAQUIN) tablet 750 mg 750 mg Per G Tube Given Narendra Macdonald RN     09/11/2018 7767 metroNIDAZOLE (FLAGYL) tablet 500 mg 500 mg Per PEG Tube Given [place] Ben, RN     09/10/2018 2141 metroNIDAZOLE (FLAGYL) tablet 500 mg 500 mg Per PEG Tube Given Jessica Ben, ZEE     09/10/2018 1626 metroNIDAZOLE (FLAGYL) tablet 500 mg 500 mg Per PEG Tube Given Narendra Macdonald RN            Lab, Imaging and other studies: I have personally reviewed pertinent reports      VTE Pharmacologic Prophylaxis: Enoxaparin (Lovenox)  VTE Mechanical Prophylaxis: sequential compression device     Sonia Parra MD  9/11/2018,10:38 AM

## 2018-09-11 NOTE — ASSESSMENT & PLAN NOTE
Sepsis was present on admission, secondary to pneumonia, suspected aspiration pneumonia, possible gram-negative pneumonia,   Follow-up procalcitonin level from today  Continue PO levaquin and PO flagyl   Sepsis has resolved

## 2018-09-11 NOTE — ASSESSMENT & PLAN NOTE
Reports possible seizure while at SNF  patient was recently transferred to [address] [place] [address] for neurology evaluation, had multiple medications adjusted, continue with same meds, previous discharge from [facility]

## 2018-09-11 NOTE — CASE MANAGEMENT
Continued Stay Review  Date: 9/11/18  Vital Signs: BP 96/64 (BP Location: Right arm)   Pulse 92   Temp 97 5 °F (36 4 °C) (Temporal)   Resp (!) 25   Ht 4' 9" (1 448 m)   Wt 47 5 kg (104 lb 11 5 oz)   LMP  (LMP Unknown)   SpO2 96%   BMI 22 66 kg/m²   Medications:   Scheduled Meds:   Current Facility-Administered Medications:  acetaminophen 650 mg Rectal Q4H PRN Олег Wang PA-C   enoxaparin 40 mg Subcutaneous Q24H [address] Tristan Harvey DO   lactulose 30 g Per G Tube TID Олег Wang PA-C   levETIRAcetam 1,000 mg Per G Tube Q12H [address] Олег Wang PA-C   levOCARNitine 750 mg Per G Tube TID Олег Wang PA-C   levofloxacin 750 mg Per G Tube Q24H Tristan Harvey DO   LORazepam 1 mg Intravenous Q4H PRN Олег Wang PA-C   magnesium hydroxide 30 mL Oral Daily PRN Олег Wang PA-C   melatonin 6 mg Per G Tube HS Олег Wang PA-C   metroNIDAZOLE 500 mg Per PEG Tube Q8H [address] Tristan Harvey DO   ondansetron 4 mg Intravenous Q6H PRN Олег Wang PA-C   perampanel 8 mg Per NG Tube HS Олег Wang PA-C   PHENobarbital 80 mg Per G Tube HS Tristan Harvey DO   potassium chloride 20 mEq Per G Tube Daily Олег Wang PA-C   rufinamide 1,200 mg Per G Tube BID Олег Wang PA-C   saccharomyces boulardii 250 mg Per G Tube Daily Олег Wang PA-C   senna-docusate sodium 1 tablet Per G Tube Daily Олег Wang PA-C   topiramate 200 mg Per G Tube Q12H [address] Luann Dunham DO   valproic acid 250 mg Per G Tube Q8H Олег Wang PA-C   Continuous Infusions:    PRN Meds:   acetaminophen    LORazepam    magnesium hydroxide    ondansetron  Abnormal Labs/Diagnostic Results:    CR 0 41 CA 8 2 AST 81 ALT 88 ALK PHOS 143 TPROT 6 0 ALB 2 2 PT 15 6 INR 1 30     Age/Sex: 41 y o  female     Assessment/Plan: MED SURG LOC  Sepsis due to undetermined organism ([facility] [address] )   Assessment & Plan     Sepsis was present on admission, secondary to pneumonia, suspected aspiration pneumonia, possible gram-negative pneumonia,   Follow-up procalcitonin level from today  Continue PO levaquin and PO flagyl   Sepsis has resolved        Discharge Plan: RETURN TO SNF

## 2018-09-12 VITALS
RESPIRATION RATE: 18 BRPM | TEMPERATURE: 97.6 F | HEART RATE: 96 BPM | DIASTOLIC BLOOD PRESSURE: 67 MMHG | SYSTOLIC BLOOD PRESSURE: 109 MMHG | OXYGEN SATURATION: 95 % | WEIGHT: 97.88 LBS | HEIGHT: 57 IN | BODY MASS INDEX: 21.12 KG/M2

## 2018-09-12 LAB
BACTERIA BLD CULT: NORMAL
BACTERIA BLD CULT: NORMAL
PROCALCITONIN SERPL-MCNC: 0.24 NG/ML

## 2018-09-12 PROCEDURE — 99239 HOSP IP/OBS DSCHRG MGMT >30: CPT | Performed by: FAMILY MEDICINE

## 2018-09-12 PROCEDURE — 84145 PROCALCITONIN (PCT): CPT | Performed by: FAMILY MEDICINE

## 2018-09-12 RX ORDER — LEVOFLOXACIN 750 MG/1
750 TABLET ORAL EVERY 24 HOURS
Qty: 3 TABLET | Refills: 0 | Status: SHIPPED | OUTPATIENT
Start: 2018-09-12 | End: 2018-09-15

## 2018-09-12 RX ORDER — METRONIDAZOLE 500 MG/1
500 TABLET ORAL EVERY 8 HOURS SCHEDULED
Qty: 9 TABLET | Refills: 0 | Status: SHIPPED | OUTPATIENT
Start: 2018-09-12 | End: 2018-09-15

## 2018-09-12 RX ADMIN — LEVOCARNITINE 750 MG: 1 SOLUTION ORAL at 09:55

## 2018-09-12 RX ADMIN — LEVETIRACETAM 1000 MG: 100 SOLUTION ORAL at 09:55

## 2018-09-12 RX ADMIN — ANORECTAL OINTMENT: 15.7; .44; 24; 20.6 OINTMENT TOPICAL at 09:46

## 2018-09-12 RX ADMIN — RUFINAMIDE 1200 MG: 200 TABLET, FILM COATED ORAL at 09:55

## 2018-09-12 RX ADMIN — Medication 250 MG: at 09:55

## 2018-09-12 RX ADMIN — VALPROIC ACID 250 MG: 500 SOLUTION ORAL at 10:21

## 2018-09-12 RX ADMIN — POTASSIUM CHLORIDE 20 MEQ: 20 SOLUTION ORAL at 09:45

## 2018-09-12 RX ADMIN — ENOXAPARIN SODIUM 40 MG: 40 INJECTION, SOLUTION INTRAVENOUS; SUBCUTANEOUS at 09:45

## 2018-09-12 RX ADMIN — TOPIRAMATE 200 MG: 100 TABLET, FILM COATED ORAL at 09:55

## 2018-09-12 RX ADMIN — VALPROIC ACID 250 MG: 500 SOLUTION ORAL at 02:18

## 2018-09-12 RX ADMIN — METRONIDAZOLE 500 MG: 500 TABLET ORAL at 05:49

## 2018-09-12 RX ADMIN — LACTULOSE 30 G: 10 SOLUTION ORAL at 10:18

## 2018-09-12 NOTE — PLAN OF CARE
Problem: Potential for Falls  Goal: Patient will remain free of falls  INTERVENTIONS:  - Assess patient frequently for physical needs  -  Identify cognitive and physical deficits and behaviors that affect risk of falls    -  [place] fall precautions as indicated by assessment   - Educate patient/family on patient safety including physical limitations  - Instruct patient to call for assistance with activity based on assessment  - Modify environment to reduce risk of injury  - Consider OT/PT consult to assist with strengthening/mobility    Outcome: Adequate for Discharge      Problem: Prexisting or High Potential for Compromised Skin Integrity  Goal: Skin integrity is maintained or improved  INTERVENTIONS:  - Identify patients at risk for skin breakdown  - Assess and monitor skin integrity  - Assess and monitor nutrition and hydration status  - Monitor labs (i e  albumin)  - Assess for incontinence   - Turn and reposition patient  - Assist with mobility/ambulation  - Relieve pressure over bony prominences  - Avoid friction and shearing  - Provide appropriate hygiene as needed including keeping skin clean and dry  - Evaluate need for skin moisturizer/barrier cream  - Collaborate with interdisciplinary team (i e  Nutrition, Rehabilitation, etc )   - Patient/family teaching   Outcome: Adequate for Discharge      Problem: PAIN - ADULT  Goal: Verbalizes/displays adequate comfort level or baseline comfort level  Interventions:  - Encourage patient to monitor pain and request assistance  - Assess pain using appropriate pain scale  - Administer analgesics based on type and severity of pain and evaluate response  - Implement non-pharmacological measures as appropriate and evaluate response  - Consider cultural and social influences on pain and pain management  - Notify physician/advanced practitioner if interventions unsuccessful or patient reports new pain   Outcome: Adequate for Discharge      Problem: INFECTION - ADULT  Goal: Absence or prevention of progression during hospitalization  INTERVENTIONS:  - Assess and monitor for signs and symptoms of infection  - Monitor lab/diagnostic results  - Monitor all insertion sites, i e  indwelling lines, tubes, and drains  - Monitor endotracheal (as able) and nasal secretions for changes in amount and color  - [place] appropriate cooling/warming therapies per order  - Administer medications as ordered  - Instruct and encourage patient and family to use good hand hygiene technique  - Identify and instruct in appropriate isolation precautions for identified infection/condition   Outcome: Adequate for Discharge      Problem: SAFETY ADULT  Goal: Maintain or return to baseline ADL function  INTERVENTIONS:  -  Assess patient's ability to carry out ADLs; assess patient's baseline for ADL function and identify physical deficits which impact ability to perform ADLs (bathing, care of mouth/teeth, toileting, grooming, dressing, etc )  - Assess/evaluate cause of self-care deficits   - Assess range of motion  - Assess patient's mobility; develop plan if impaired  - Assess patient's need for assistive devices and provide as appropriate  - Encourage maximum independence but intervene and supervise when necessary  ¯ Involve family in performance of ADLs  ¯ Assess for home care needs following discharge   ¯ Request OT consult to assist with ADL evaluation and planning for discharge  ¯ Provide patient education as appropriate   Outcome: Adequate for Discharge    Goal: Maintain or return mobility status to optimal level  INTERVENTIONS:  - Assess patient's baseline mobility status (ambulation, transfers, stairs, etc )    - Identify cognitive and physical deficits and behaviors that affect mobility  - Identify mobility aids required to assist with transfers and/or ambulation (gait belt, sit-to-stand, lift, walker, cane, etc )  - [place] fall precautions as indicated by assessment  - Record patient progress and toleration of activity level on Mobility SBAR; progress patient to next Phase/Stage  - Instruct patient to call for assistance with activity based on assessment  - Request Rehabilitation consult to assist with strengthening/weightbearing, etc    Outcome: Adequate for Discharge    Goal: Patient will remain free of falls  INTERVENTIONS:  - Assess patient frequently for physical needs  -  Identify cognitive and physical deficits and behaviors that affect risk of falls    -  [place] fall precautions as indicated by assessment   - Educate patient/family on patient safety including physical limitations  - Instruct patient to call for assistance with activity based on assessment  - Modify environment to reduce risk of injury  - Consider OT/PT consult to assist with strengthening/mobility    Outcome: Adequate for Discharge

## 2018-09-12 NOTE — DISCHARGE INSTRUCTIONS
1) Tube Feeding recommendations are Jevity 1 2 continuous rate at 50ml/hr with water flushes   2) Follow up with family doctor to review medications and continuous tube feeds  3) follow up with neurology as previously scheduled

## 2018-09-12 NOTE — NURSING NOTE
Patient DC back to [address] via stretcher with [facility] crew in no acute distress  Peg tube intact abdomen  Patient remains lethargic but does respond to voice  Report on patient status to Trihn at [facility] who reports understanding of the same  Patients summary of care and DC instructions sent with ambulance crew

## 2018-09-12 NOTE — DISCHARGE SUMMARY
Discharge Summary - Kathia Singh 41 y o  female MRN: [id number]    Unit/Bed#: [facility] Encounter: [id number]    Admission Date:   Admission Orders     Ordered        09/07/18 0724  Inpatient Admission  Once         09/07/18 0045  Place in Observation (expected length of stay for this patient is less than two midnights)  Once               Admitting Diagnosis: Seizure ([address] ) [R56 9]    HPI:      Kathia Singh is a 41 y o  female who presents to ER from SNF with complaint of changes in mental status, possible seizure at [facility]  As per NH staff  she had episode of vomiting in her room and appeared to have trouble breathing with concern for aspiration  Patient is nonverbal at baseline with hx of Anoxic brain damage, seizures,Autistic disorder  No seizure activity noted in ER  Labs completed in ER with results as shown below  Chest X-ray completed official report pending  Procedures Performed: No orders of the defined types were placed in this encounter  Summary of Hospital Course:     Sepsis present on admission secondary to suspected aspiration pneumonia possible gram-negative pneumonia  Patient admitted to medical floor  Started on vancomycin, Levaquin, Flagyl  Blood and urine cultures were obtained  Procalcitonin was trended  On 09/09/2018 vancomycin was discontinued  Flagyl and Levaquin were continued per OG tube  On hospital day 3 the patient was converted to oral antibiotics and monitored for 48 hours to assess improvement of procalcitonin which I did  Patient will be discharged home to complete a 7 day course of antibiotics  Follow up with her PCP within 1 week      Sydell Huslia Seizure Disorder: no medication changes were made to patient's antiepileptic regimen, she will follow up with her primary neurologist as previously scheduled       Significant Findings, Care, Treatment and Services Provided:     CT C/A/PBilateral multifocal lung infiltrates compatible with pneumonia      No acute pathology in the abdomen and pelvis  Marked distention of the urinary bladder noted        Complications: none    Discharge Diagnosis: see above    Resolved Problems  Date Reviewed: 9/10/2018          Resolved    Lactic acidosis 9/9/2018     Resolved by  Dede St DO    Hypokalemia 9/9/2018     Resolved by  Dede St DO          Condition at Discharge: fair         Discharge instructions/Information to patient and family:   See after visit summary for information provided to patient and family  Provisions for Follow-Up Care:  See after visit summary for information related to follow-up care and any pertinent home health orders  PCP: Sarath Pham DO    Disposition: Home    Planned Readmission: No    Discharge Statement   I spent 60 minutes discharging the patient  This time was spent on the day of discharge  I had direct contact with the patient on the day of discharge  Additional documentation is required if more than 30 minutes were spent on discharge  Discharge Medications:  See after visit summary for reconciled discharge medications provided to patient and family

## 2018-09-12 NOTE — SOCIAL WORK
Pt is being discharged today   Pt will return to [facility]   [facility] ambulance will pick the pt up at 2:30 with a stretcher  Notified [employer] of transport time  Called pt's daughter and left her message of discharge time  Case Management reviewed discharge planning process including the following: identifying help that is needed at home, pt's preference for discharge needs and Meds at [facility]  Reviewed with Pt that any member of the healthcare team can answer questions regarding : medications, jmportance of recognizing  Signs and symptoms of any  medical problems  Case Management also encouraged pt to follow up with all recommended appointments after discharge

## 2018-09-18 ENCOUNTER — HOSPITAL ENCOUNTER (EMERGENCY)
Facility: HOSPITAL | Age: 38
Discharge: HOME | End: 2018-09-19
Attending: EMERGENCY MEDICINE | Admitting: EMERGENCY MEDICINE
Payer: MEDICARE

## 2018-09-18 VITALS
HEART RATE: 90 BPM | TEMPERATURE: 98.5 F | RESPIRATION RATE: 17 BRPM | SYSTOLIC BLOOD PRESSURE: 86 MMHG | DIASTOLIC BLOOD PRESSURE: 61 MMHG | OXYGEN SATURATION: 97 % | BODY MASS INDEX: 20.93 KG/M2 | HEIGHT: 57 IN | WEIGHT: 97 LBS

## 2018-09-18 DIAGNOSIS — Z46.59 ENCOUNTER FOR CARE RELATED TO FEEDING TUBE: ICD-10-CM

## 2018-09-18 DIAGNOSIS — Z09 STATUS POST GASTROSTOMY TUBE (G TUBE) PLACEMENT, FOLLOW-UP EXAM: Primary | ICD-10-CM

## 2018-09-19 ENCOUNTER — APPOINTMENT (EMERGENCY)
Dept: RADIOLOGY | Facility: HOSPITAL | Age: 38
End: 2018-09-19
Payer: MEDICARE

## 2018-09-19 PROCEDURE — 74018 RADEX ABDOMEN 1 VIEW: CPT

## 2018-09-19 PROCEDURE — 99283 EMERGENCY DEPT VISIT LOW MDM: CPT

## 2018-09-19 NOTE — ED PROCEDURE NOTE
PROCEDURE  Procedures     16 [place] G-tube placed via previous fistula  There were no complications to this procedure  Patient tolerated well  A post G-tube placement x-ray was shot with contrast to confirm placement  Tube was in good position       Meera Crenshaw DO  09/19/18 3722

## 2018-09-19 NOTE — ED PROVIDER NOTES
History  Chief Complaint   Patient presents with    Feeding Tube Problem     Pt sent from [facility] unable to locate her Gtube at this time     26-year-old female presents from [facility] after dislodging her G tube        History provided by:  Patient and EMS personnel  Feeding Tube Problem   Location:  Gastrostomy tube  Severity:  Mild  Onset quality:  Sudden  Duration:  1 hour  Timing:  Intermittent  Associated symptoms: no abdominal pain, no chest pain, no congestion, no cough and no headaches        Prior to Admission Medications   Prescriptions Last Dose Informant Patient Reported? Taking?    MELATONIN PO   Yes No   Si mg by Per G Tube route daily at bedtime   Multiple Vitamins-Minerals (CENTRUM SILVER PO)   Yes No   Si tablet by Per G Tube route daily   PHENobarbital 20 mg/5 mL elixir   No No   Sig: Take 15 mL (60 mg total) by mouth daily at bedtime   Probiotic Product (ALEXANDER-BID PROBIOTIC PO)   Yes No   Si tablet by Per G Tube route daily     Sennosides-Docusate Sodium (SENEXON-S PO)   Yes No   Si tablet by Per G Tube route daily     acetaminophen (TYLENOL) 325 mg tablet   Yes No   Si mg by Per G Tube route every 6 (six) hours as needed for mild pain   bisacodyl (FLEET) 10 MG/30ML ENEM   Yes No   Sig: Insert 10 mg into the rectum as needed for constipation   lactulose 20 g/30 mL   No No   Si mL (30 g total) by Per G Tube route 3 (three) times a day   levETIRAcetam (KEPPRA) 100 mg/mL oral solution   No No   Sig: 10 mL (1,000 mg total) by Per G Tube route every 12 (twelve) hours for 30 days   levOCARNitine (CARNITOR) 1 g/10 mL solution   No No   Si 5 mL (750 mg total) by Per G Tube route 3 (three) times a day   magnesium hydroxide (MILK OF MAGNESIA) 400 mg/5 mL oral suspension   Yes No   Sig: Take 30 mL by mouth daily as needed for constipation   ondansetron (ZOFRAN) 4 mg tablet   Yes No   Si mg by Per G Tube route every 8 (eight) hours as needed for nausea or vomiting     perampanel (FYCOMPA) oral suspension   No No   Si mL (8 mg total) by Per NG Tube route daily at bedtime Per G tube   potassium chloride 10 %   Yes No   Si mEq by Per G Tube route daily     rufinamide (BANZEL) 400 mg tablet   No No   Sig: 3 tablets (1,200 mg total) by Per G Tube route 2 (two) times a day   topiramate (TOPAMAX) 200 MG tablet   No No   Sig: Take 1 tablet (200 mg total) by mouth every 12 (twelve) hours   valproic acid (DEPAKENE) 250 MG/5ML soln   No No   Si mL (250 mg total) by Per G Tube route every 8 (eight) hours      Facility-Administered Medications: None       Past Medical History:   Diagnosis Date    ADHD     Anoxic brain damage (HCC)     Autistic disorder     Hyperammonemia (HCC)     Hyperkeratosis     Hypotension     Intellectual disability     Intellectual disability     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with tonic seizures (HCC)     Lethargy     Liver enzyme elevation     Onychomycosis     Osteoporosis     Osteoporosis     Psychiatric disorder     anxiety       Past Surgical History:   Procedure Laterality Date    ABDOMINAL SURGERY      CARDIAC PACEMAKER PLACEMENT      JEJUNOSTOMY FEEDING TUBE      PEG TUBE PLACEMENT         History reviewed  No pertinent family history  I have reviewed and agree with the history as documented  Social History   Substance Use Topics    Smoking status: Never Smoker    Smokeless tobacco: Never Used    Alcohol use No        Review of Systems   Constitutional: Negative for activity change and appetite change  HENT: Negative for congestion  Eyes: Negative for pain, discharge and itching  Respiratory: Negative for cough  Cardiovascular: Negative for chest pain  Gastrointestinal: Negative for abdominal pain  Endocrine: Negative for cold intolerance, heat intolerance and polydipsia  Genitourinary: Negative for difficulty urinating, dyspareunia and dysuria     Musculoskeletal: Negative for arthralgias, back pain and gait problem  Skin: Negative for color change and pallor  Allergic/Immunologic: Negative for environmental allergies and food allergies  Neurological: Negative for dizziness, facial asymmetry, light-headedness and headaches  Hematological: Negative for adenopathy  Psychiatric/Behavioral: Negative for agitation, behavioral problems and confusion  All other systems reviewed and are negative  Physical Exam  Physical Exam   Constitutional: She appears well-developed and well-nourished  HENT:   Head: Normocephalic  Right Ear: External ear normal    Left Ear: External ear normal    Eyes: Pupils are equal, round, and reactive to light  Right eye exhibits no discharge  Left eye exhibits no discharge  Neck: Normal range of motion  No tracheal deviation present  No thyromegaly present  Cardiovascular: Normal rate, regular rhythm and normal heart sounds  Pulmonary/Chest: Effort normal  No respiratory distress  She has no wheezes  She has no rales  Abdominal: Soft  She exhibits no distension  There is no tenderness  There is no guarding  Musculoskeletal: Normal range of motion  She exhibits no edema or deformity  Neurological: She is alert  No cranial nerve deficit  Skin: Skin is warm  Capillary refill takes less than 2 seconds  No erythema  Psychiatric: She has a normal mood and affect  Vitals reviewed        Vital Signs  ED Triage Vitals [09/18/18 2327]   Temperature Pulse Respirations Blood Pressure SpO2   98 5 °F (36 9 °C) 90 17 (!) 86/61 97 %      Temp Source Heart Rate Source Patient Position - Orthostatic VS BP Location FiO2 (%)   Temporal Monitor Lying Right arm --      Pain Score       No Pain           Vitals:    09/18/18 2327   BP: (!) 86/61   Pulse: 90   Patient Position - Orthostatic VS: Lying       Visual Acuity      ED Medications  Medications - No data to display    Diagnostic Studies  Results Reviewed     None                 XR abdomen 1 view portable (Results Pending)              Procedures  Procedures       Phone Contacts  ED Phone Contact    ED Course  ED Course as of Sep 19 0020   Wed Sep 19, 2018   0016 StatusPost G-tube placement in good position                                MDM  Number of Diagnoses or Management Options  Encounter for care related to feeding tube:   Status post gastrostomy tube (G tube) placement, follow-up exam:   Diagnosis management comments: Peg tube replaced without difficulty    CritCare Time    Disposition  Final diagnoses:   Status post gastrostomy tube (G tube) placement, follow-up exam   Encounter for care related to feeding tube     Time reflects when diagnosis was documented in both MDM as applicable and the Disposition within this note     Time User Action Codes Description Comment    9/19/2018 12:16 AM Silver Fast Add [Z09] Status post gastrostomy tube (G tube) placement, follow-up exam     9/19/2018 12:17 AM Silver Fast Add [Z46 59] Encounter for care related to feeding tube       ED Disposition     ED Disposition Condition Comment    Discharge  [facility] discharge to home/self care      Condition at discharge: Good        Follow-up Information    None         Discharge Medication List as of 9/19/2018 12:17 AM      CONTINUE these medications which have NOT CHANGED    Details   acetaminophen (TYLENOL) 325 mg tablet 650 mg by Per G Tube route every 6 (six) hours as needed for mild pain, Historical Med      bisacodyl (FLEET) 10 MG/30ML ENEM Insert 10 mg into the rectum as needed for constipation, Historical Med      lactulose 20 g/30 mL 45 mL (30 g total) by Per G Tube route 3 (three) times a day, Starting Fri 8/24/2018, No Print      levETIRAcetam (KEPPRA) 100 mg/mL oral solution 10 mL (1,000 mg total) by Per G Tube route every 12 (twelve) hours for 30 days, Starting Tue 9/4/2018, Until Thu 10/4/2018, Print      levOCARNitine (CARNITOR) 1 g/10 mL solution 7 5 mL (750 mg total) by Per G Tube route 3 (three) times a day, Starting Tue 9/4/2018, Print      magnesium hydroxide (MILK OF MAGNESIA) 400 mg/5 mL oral suspension Take 30 mL by mouth daily as needed for constipation, Historical Med      MELATONIN PO 6 mg by Per G Tube route daily at bedtime, Historical Med      Multiple Vitamins-Minerals (CENTRUM SILVER PO) 1 tablet by Per G Tube route daily, Until Discontinued, Historical Med      ondansetron (ZOFRAN) 4 mg tablet 4 mg by Per G Tube route every 8 (eight) hours as needed for nausea or vomiting  , Historical Med      perampanel (FYCOMPA) oral suspension 16 mL (8 mg total) by Per NG Tube route daily at bedtime Per G tube, Starting Tue 9/4/2018, Print      PHENobarbital 20 mg/5 mL elixir Take 15 mL (60 mg total) by mouth daily at bedtime, Starting Tue 9/4/2018, Print      potassium chloride 10 % 20 mEq by Per G Tube route daily  , Historical Med      Probiotic Product (ALEXANDER-BID PROBIOTIC PO) 1 tablet by Per G Tube route daily  , Historical Med      rufinamide (BANZEL) 400 mg tablet 3 tablets (1,200 mg total) by Per G Tube route 2 (two) times a day, Starting Tue 9/4/2018, Print      Sennosides-Docusate Sodium (SENEXON-S PO) 1 tablet by Per G Tube route daily  , Until Discontinued, Historical Med      topiramate (TOPAMAX) 200 MG tablet Take 1 tablet (200 mg total) by mouth every 12 (twelve) hours, Starting Tue 9/4/2018, Print      valproic acid (DEPAKENE) 250 MG/5ML soln 5 mL (250 mg total) by Per G Tube route every 8 (eight) hours, Starting Tue 9/4/2018, Print           No discharge procedures on file      ED Provider  Electronically Signed by           Lou Billingsley DO  09/19/18 0020

## 2018-09-19 NOTE — DISCHARGE INSTRUCTIONS
Tube Feeding   WHAT YOU SHOULD KNOW:   Tube feeding provides your body with nutrients when you are not able to eat or cannot absorb nutrition from the food you eat  Tube feeding contains water, protein, sugar, fats, vitamins, minerals, and electrolytes  You may need tube feeding for several days or weeks  Tube feeding can be given through a tube placed in your nose or mouth and into your stomach  Tube feeding can also be given through a percutaneous endoscopic gastronomy (PEG) tube placed directly into your stomach through your abdomen  Ask for more information on a PEG tube  INSTRUCTIONS:   Risks of EN:   · Nasal or throat discomfort or dry mouth     · Nausea, diarrhea, abdominal cramping     · Gastric reflux, which can cause vomiting and aspiration pneumonia (pneumonia caused by presence of liquid or food in your lungs)     · Blocked or misplaced feeding tube, which can cause aspiration pneumonia     · Infection at the site of the PEG tube  After you leave the hospital:  If you need tube feeding after you leave the hospital, a healthcare provider will teach you or someone close to you how to give tube feeding formula at home  You will learn how to use the equipment and care for your tube  If you have a PEG tube, you will learn how to protect the skin at the insertion site and change the dressing  Your healthcare provider will make sure the correct supplies are delivered to your home  Caregivers will visit you regularly to monitor your health while you are getting tube feeding  Self-care at home with EN:   · Follow up with your healthcare provider as directed  You will need regular blood and urine tests to check for infection or other problems  · Sit up during feeding to avoid reflux and aspiration (movement of tube feeding into your lungs)  Remain sitting for 1 hour after your feeding is complete  · Keep track of how much tube feeding you take in and how much you urinate   Write down any changes in bowel movements  Weigh yourself as directed by your healthcare provider  · Care for your feeding tube as directed  Flush your tube with warm water before and after feedings to prevent blockages  · If you have a PEG tube, clean the skin around the insertion site as directed by your healthcare provider  Check your skin for signs of infection, such as redness, swelling, tenderness, warmth, or drainage  · Continue regular mouth care  Use mouthwash 3 to 4 times a day to keep your mouth moist and prevent infection  Contact your healthcare provider if:   · You have discomfort or pain around your PEG tube site  · You have nausea, diarrhea, or abdominal bloating or discomfort  · You weigh less than your healthcare provider says you should  · You have questions or concerns about your condition or care  Return to the emergency department if:   · You start coughing or vomiting during or after a feeding  · You have severe abdominal pain  · The skin around the PEG tube site is red, swollen, tender, or warm  · You have increased pain during feeding or when your PEG tube is flushed  · Blood or tube feeding fluid leaks from the PEG tube site  · Your PEG tube comes out  © 2014 [employer] is for End User's use only and may not be sold, redistributed or otherwise used for commercial purposes  All illustrations and images included in CareNotes® are the copyrighted property of [employer]  or Jason Chiu  The above information is an  only  It is not intended as medical advice for individual conditions or treatments  Talk to your doctor, nurse or pharmacist before following any medical regimen to see if it is safe and effective for you

## 2018-10-07 ENCOUNTER — HOSPITAL ENCOUNTER (INPATIENT)
Facility: HOSPITAL | Age: 38
LOS: 6 days | Discharge: NURSING, REHAB OR LONG-TERM CARE | DRG: 871 | End: 2018-10-13
Attending: EMERGENCY MEDICINE | Admitting: INTERNAL MEDICINE
Payer: MEDICARE

## 2018-10-07 ENCOUNTER — APPOINTMENT (EMERGENCY)
Dept: RADIOLOGY | Facility: HOSPITAL | Age: 38
DRG: 871 | End: 2018-10-07
Payer: MEDICARE

## 2018-10-07 DIAGNOSIS — R23.8 SKIN BREAKDOWN: Primary | ICD-10-CM

## 2018-10-07 DIAGNOSIS — G40.812 LENNOX-GASTAUT SYNDROME WITH TONIC SEIZURES (HCC): ICD-10-CM

## 2018-10-07 DIAGNOSIS — Z22.322 MRSA COLONIZATION: ICD-10-CM

## 2018-10-07 DIAGNOSIS — E88.09 HYPOALBUMINEMIA: ICD-10-CM

## 2018-10-07 PROBLEM — G93.40 ENCEPHALOPATHY: Status: ACTIVE | Noted: 2018-10-07

## 2018-10-07 PROBLEM — J69.0 ASPIRATION PNEUMONIA DUE TO INHALATION OF VOMITUS (HCC): Status: ACTIVE | Noted: 2018-10-07

## 2018-10-07 LAB
ALBUMIN SERPL BCP-MCNC: 2.6 G/DL (ref 3.5–5)
ALP SERPL-CCNC: 137 U/L (ref 46–116)
ALT SERPL W P-5'-P-CCNC: 88 U/L (ref 12–78)
ANION GAP SERPL CALCULATED.3IONS-SCNC: 8 MMOL/L (ref 4–13)
APTT PPP: 32 SECONDS (ref 24–36)
AST SERPL W P-5'-P-CCNC: 95 U/L (ref 5–45)
BASOPHILS # BLD AUTO: 0.05 THOUSANDS/ΜL (ref 0–0.1)
BASOPHILS NFR BLD AUTO: 1 % (ref 0–1)
BILIRUB SERPL-MCNC: 0.5 MG/DL (ref 0.2–1)
BUN SERPL-MCNC: 14 MG/DL (ref 5–25)
CALCIUM SERPL-MCNC: 9.4 MG/DL (ref 8.3–10.1)
CHLORIDE SERPL-SCNC: 104 MMOL/L (ref 100–108)
CO2 SERPL-SCNC: 28 MMOL/L (ref 21–32)
CREAT SERPL-MCNC: 0.54 MG/DL (ref 0.6–1.3)
EOSINOPHIL # BLD AUTO: 0.1 THOUSAND/ΜL (ref 0–0.61)
EOSINOPHIL NFR BLD AUTO: 1 % (ref 0–6)
ERYTHROCYTE [DISTWIDTH] IN BLOOD BY AUTOMATED COUNT: 13.3 % (ref 11.6–15.1)
GFR SERPL CREATININE-BSD FRML MDRD: 121 ML/MIN/1.73SQ M
GLUCOSE SERPL-MCNC: 116 MG/DL (ref 65–140)
HCT VFR BLD AUTO: 45 % (ref 34.8–46.1)
HGB BLD-MCNC: 14.7 G/DL (ref 11.5–15.4)
IMM GRANULOCYTES # BLD AUTO: 0.03 THOUSAND/UL (ref 0–0.2)
IMM GRANULOCYTES NFR BLD AUTO: 0 % (ref 0–2)
INR PPP: 1.09 (ref 0.86–1.17)
LACTATE SERPL-SCNC: 2.4 MMOL/L (ref 0.5–2)
LACTATE SERPL-SCNC: 3.9 MMOL/L (ref 0.5–2)
LACTATE SERPL-SCNC: 3.9 MMOL/L (ref 0.5–2)
LACTATE SERPL-SCNC: 4.4 MMOL/L (ref 0.5–2)
LYMPHOCYTES # BLD AUTO: 1.55 THOUSANDS/ΜL (ref 0.6–4.47)
LYMPHOCYTES NFR BLD AUTO: 22 % (ref 14–44)
MAGNESIUM SERPL-MCNC: 2 MG/DL (ref 1.6–2.6)
MCH RBC QN AUTO: 33.4 PG (ref 26.8–34.3)
MCHC RBC AUTO-ENTMCNC: 32.7 G/DL (ref 31.4–37.4)
MCV RBC AUTO: 102 FL (ref 82–98)
MONOCYTES # BLD AUTO: 0.52 THOUSAND/ΜL (ref 0.17–1.22)
MONOCYTES NFR BLD AUTO: 8 % (ref 4–12)
NEUTROPHILS # BLD AUTO: 4.68 THOUSANDS/ΜL (ref 1.85–7.62)
NEUTS SEG NFR BLD AUTO: 68 % (ref 43–75)
NRBC BLD AUTO-RTO: 0 /100 WBCS
PLATELET # BLD AUTO: 175 THOUSANDS/UL (ref 149–390)
PLATELET # BLD AUTO: 229 THOUSANDS/UL (ref 149–390)
PMV BLD AUTO: 11.4 FL (ref 8.9–12.7)
PMV BLD AUTO: 11.8 FL (ref 8.9–12.7)
POTASSIUM SERPL-SCNC: 5.2 MMOL/L (ref 3.5–5.3)
PROT SERPL-MCNC: 7.2 G/DL (ref 6.4–8.2)
PROTHROMBIN TIME: 13.6 SECONDS (ref 11.8–14.2)
RBC # BLD AUTO: 4.4 MILLION/UL (ref 3.81–5.12)
SODIUM SERPL-SCNC: 140 MMOL/L (ref 136–145)
WBC # BLD AUTO: 6.93 THOUSAND/UL (ref 4.31–10.16)

## 2018-10-07 PROCEDURE — 85049 AUTOMATED PLATELET COUNT: CPT | Performed by: PHYSICIAN ASSISTANT

## 2018-10-07 PROCEDURE — 96365 THER/PROPH/DIAG IV INF INIT: CPT

## 2018-10-07 PROCEDURE — 96367 TX/PROPH/DG ADDL SEQ IV INF: CPT

## 2018-10-07 PROCEDURE — 85610 PROTHROMBIN TIME: CPT | Performed by: EMERGENCY MEDICINE

## 2018-10-07 PROCEDURE — 80165 DIPROPYLACETIC ACID FREE: CPT | Performed by: PHYSICIAN ASSISTANT

## 2018-10-07 PROCEDURE — 80177 DRUG SCRN QUAN LEVETIRACETAM: CPT | Performed by: PHYSICIAN ASSISTANT

## 2018-10-07 PROCEDURE — 85730 THROMBOPLASTIN TIME PARTIAL: CPT | Performed by: EMERGENCY MEDICINE

## 2018-10-07 PROCEDURE — 80184 ASSAY OF PHENOBARBITAL: CPT | Performed by: PHYSICIAN ASSISTANT

## 2018-10-07 PROCEDURE — 99222 1ST HOSP IP/OBS MODERATE 55: CPT | Performed by: PHYSICIAN ASSISTANT

## 2018-10-07 PROCEDURE — 83735 ASSAY OF MAGNESIUM: CPT | Performed by: EMERGENCY MEDICINE

## 2018-10-07 PROCEDURE — 83605 ASSAY OF LACTIC ACID: CPT | Performed by: EMERGENCY MEDICINE

## 2018-10-07 PROCEDURE — 80053 COMPREHEN METABOLIC PANEL: CPT | Performed by: EMERGENCY MEDICINE

## 2018-10-07 PROCEDURE — 36415 COLL VENOUS BLD VENIPUNCTURE: CPT | Performed by: EMERGENCY MEDICINE

## 2018-10-07 PROCEDURE — 96368 THER/DIAG CONCURRENT INF: CPT

## 2018-10-07 PROCEDURE — 99285 EMERGENCY DEPT VISIT HI MDM: CPT

## 2018-10-07 PROCEDURE — 85025 COMPLETE CBC W/AUTO DIFF WBC: CPT | Performed by: EMERGENCY MEDICINE

## 2018-10-07 PROCEDURE — 84145 PROCALCITONIN (PCT): CPT | Performed by: EMERGENCY MEDICINE

## 2018-10-07 PROCEDURE — 87040 BLOOD CULTURE FOR BACTERIA: CPT | Performed by: EMERGENCY MEDICINE

## 2018-10-07 PROCEDURE — 83605 ASSAY OF LACTIC ACID: CPT | Performed by: PHYSICIAN ASSISTANT

## 2018-10-07 PROCEDURE — 93005 ELECTROCARDIOGRAM TRACING: CPT

## 2018-10-07 PROCEDURE — 71045 X-RAY EXAM CHEST 1 VIEW: CPT

## 2018-10-07 RX ORDER — VALPROIC ACID 250 MG/5ML
250 SOLUTION ORAL EVERY 8 HOURS
Status: DISCONTINUED | OUTPATIENT
Start: 2018-10-07 | End: 2018-10-13 | Stop reason: HOSPADM

## 2018-10-07 RX ORDER — LACTULOSE 20 G/30ML
30 SOLUTION ORAL 3 TIMES DAILY
Status: DISCONTINUED | OUTPATIENT
Start: 2018-10-07 | End: 2018-10-13 | Stop reason: HOSPADM

## 2018-10-07 RX ORDER — LEVOCARNITINE 1 G/10ML
750 SOLUTION ORAL 3 TIMES DAILY
Status: DISCONTINUED | OUTPATIENT
Start: 2018-10-07 | End: 2018-10-13 | Stop reason: HOSPADM

## 2018-10-07 RX ORDER — TOPIRAMATE 100 MG/1
200 TABLET, FILM COATED ORAL EVERY 12 HOURS SCHEDULED
Status: DISCONTINUED | OUTPATIENT
Start: 2018-10-07 | End: 2018-10-13 | Stop reason: HOSPADM

## 2018-10-07 RX ORDER — PHENOBARBITAL 20 MG/5ML
60 ELIXIR ORAL
Status: DISCONTINUED | OUTPATIENT
Start: 2018-10-07 | End: 2018-10-13 | Stop reason: HOSPADM

## 2018-10-07 RX ORDER — LACTOBACILLUS ACIDOPHILUS / LACTOBACILLUS BULGARICUS 100 MILLION CFU STRENGTH
1 GRANULES ORAL 3 TIMES DAILY
Status: DISCONTINUED | OUTPATIENT
Start: 2018-10-07 | End: 2018-10-13 | Stop reason: HOSPADM

## 2018-10-07 RX ORDER — HEPARIN SODIUM 5000 [USP'U]/ML
5000 INJECTION, SOLUTION INTRAVENOUS; SUBCUTANEOUS EVERY 8 HOURS SCHEDULED
Status: DISCONTINUED | OUTPATIENT
Start: 2018-10-07 | End: 2018-10-13 | Stop reason: HOSPADM

## 2018-10-07 RX ORDER — RUFINAMIDE 200 MG/1
1200 TABLET, FILM COATED ORAL 2 TIMES DAILY WITH MEALS
Status: DISCONTINUED | OUTPATIENT
Start: 2018-10-07 | End: 2018-10-13 | Stop reason: HOSPADM

## 2018-10-07 RX ORDER — ACETAMINOPHEN 160 MG/5ML
500 SUSPENSION, ORAL (FINAL DOSE FORM) ORAL EVERY 4 HOURS PRN
Status: DISCONTINUED | OUTPATIENT
Start: 2018-10-07 | End: 2018-10-07

## 2018-10-07 RX ORDER — LEVETIRACETAM 100 MG/ML
1000 SOLUTION ORAL EVERY 12 HOURS SCHEDULED
Status: DISCONTINUED | OUTPATIENT
Start: 2018-10-07 | End: 2018-10-13 | Stop reason: HOSPADM

## 2018-10-07 RX ORDER — SODIUM CHLORIDE 9 MG/ML
50 INJECTION, SOLUTION INTRAVENOUS CONTINUOUS
Status: DISCONTINUED | OUTPATIENT
Start: 2018-10-07 | End: 2018-10-08

## 2018-10-07 RX ORDER — RUFINAMIDE 400 MG/1
1200 TABLET, FILM COATED ORAL 2 TIMES DAILY
Status: DISCONTINUED | OUTPATIENT
Start: 2018-10-07 | End: 2018-10-07

## 2018-10-07 RX ORDER — ACETAMINOPHEN 160 MG/5ML
500 SUSPENSION, ORAL (FINAL DOSE FORM) ORAL EVERY 4 HOURS PRN
Status: DISCONTINUED | OUTPATIENT
Start: 2018-10-07 | End: 2018-10-09

## 2018-10-07 RX ADMIN — LACTULOSE 30 G: 10 SOLUTION ORAL at 21:36

## 2018-10-07 RX ADMIN — SODIUM CHLORIDE 50 ML/HR: 0.9 INJECTION, SOLUTION INTRAVENOUS at 20:37

## 2018-10-07 RX ADMIN — SODIUM CHLORIDE, SODIUM LACTATE, POTASSIUM CHLORIDE, AND CALCIUM CHLORIDE 1000 ML: .6; .31; .03; .02 INJECTION, SOLUTION INTRAVENOUS at 16:23

## 2018-10-07 RX ADMIN — CEFEPIME HYDROCHLORIDE 2000 MG: 2 INJECTION, SOLUTION INTRAVENOUS at 16:23

## 2018-10-07 RX ADMIN — ACETAMINOPHEN 500 MG: 160 SUSPENSION ORAL at 20:36

## 2018-10-07 RX ADMIN — PHENOBARBITAL 60 MG: 20 LIQUID ORAL at 22:00

## 2018-10-07 RX ADMIN — HEPARIN SODIUM 5000 UNITS: 5000 INJECTION, SOLUTION INTRAVENOUS; SUBCUTANEOUS at 21:37

## 2018-10-07 RX ADMIN — TOPIRAMATE 200 MG: 100 TABLET, FILM COATED ORAL at 21:37

## 2018-10-07 RX ADMIN — Medication 200 MG: at 22:13

## 2018-10-07 RX ADMIN — VANCOMYCIN HYDROCHLORIDE 750 MG: 750 INJECTION, POWDER, LYOPHILIZED, FOR SOLUTION INTRAVENOUS at 18:11

## 2018-10-07 RX ADMIN — SODIUM CHLORIDE, SODIUM LACTATE, POTASSIUM CHLORIDE, AND CALCIUM CHLORIDE 1000 ML: .6; .31; .03; .02 INJECTION, SOLUTION INTRAVENOUS at 16:24

## 2018-10-08 ENCOUNTER — APPOINTMENT (INPATIENT)
Dept: RADIOLOGY | Facility: HOSPITAL | Age: 38
DRG: 871 | End: 2018-10-08
Payer: MEDICARE

## 2018-10-08 LAB
ALBUMIN SERPL BCP-MCNC: 2 G/DL (ref 3.5–5)
ALP SERPL-CCNC: 110 U/L (ref 46–116)
ALT SERPL W P-5'-P-CCNC: 64 U/L (ref 12–78)
ANION GAP SERPL CALCULATED.3IONS-SCNC: 5 MMOL/L (ref 4–13)
APTT PPP: 37 SECONDS (ref 24–36)
AST SERPL W P-5'-P-CCNC: 57 U/L (ref 5–45)
BASOPHILS # BLD AUTO: 0.08 THOUSANDS/ΜL (ref 0–0.1)
BASOPHILS NFR BLD AUTO: 0 % (ref 0–1)
BILIRUB SERPL-MCNC: 0.6 MG/DL (ref 0.2–1)
BUN SERPL-MCNC: 12 MG/DL (ref 5–25)
CALCIUM SERPL-MCNC: 8.7 MG/DL (ref 8.3–10.1)
CHLORIDE SERPL-SCNC: 105 MMOL/L (ref 100–108)
CO2 SERPL-SCNC: 30 MMOL/L (ref 21–32)
CREAT SERPL-MCNC: 0.44 MG/DL (ref 0.6–1.3)
EOSINOPHIL # BLD AUTO: 0.01 THOUSAND/ΜL (ref 0–0.61)
EOSINOPHIL NFR BLD AUTO: 0 % (ref 0–6)
ERYTHROCYTE [DISTWIDTH] IN BLOOD BY AUTOMATED COUNT: 13.4 % (ref 11.6–15.1)
GFR SERPL CREATININE-BSD FRML MDRD: 129 ML/MIN/1.73SQ M
GLUCOSE SERPL-MCNC: 108 MG/DL (ref 65–140)
HCT VFR BLD AUTO: 36.9 % (ref 34.8–46.1)
HGB BLD-MCNC: 12.1 G/DL (ref 11.5–15.4)
IMM GRANULOCYTES # BLD AUTO: 0.15 THOUSAND/UL (ref 0–0.2)
IMM GRANULOCYTES NFR BLD AUTO: 1 % (ref 0–2)
INR PPP: 1.35 (ref 0.86–1.17)
LACTATE SERPL-SCNC: 2 MMOL/L (ref 0.5–2)
LACTATE SERPL-SCNC: 2.2 MMOL/L (ref 0.5–2)
LACTATE SERPL-SCNC: 2.2 MMOL/L (ref 0.5–2)
LACTATE SERPL-SCNC: 2.4 MMOL/L (ref 0.5–2)
LYMPHOCYTES # BLD AUTO: 2.97 THOUSANDS/ΜL (ref 0.6–4.47)
LYMPHOCYTES NFR BLD AUTO: 11 % (ref 14–44)
MAGNESIUM SERPL-MCNC: 1.8 MG/DL (ref 1.6–2.6)
MCH RBC QN AUTO: 33.5 PG (ref 26.8–34.3)
MCHC RBC AUTO-ENTMCNC: 32.8 G/DL (ref 31.4–37.4)
MCV RBC AUTO: 102 FL (ref 82–98)
MONOCYTES # BLD AUTO: 1.9 THOUSAND/ΜL (ref 0.17–1.22)
MONOCYTES NFR BLD AUTO: 7 % (ref 4–12)
NEUTROPHILS # BLD AUTO: 22.96 THOUSANDS/ΜL (ref 1.85–7.62)
NEUTS SEG NFR BLD AUTO: 81 % (ref 43–75)
NRBC BLD AUTO-RTO: 0 /100 WBCS
PHENOBARB SERPL-MCNC: 13.2 UG/ML (ref 15–40)
PHOSPHATE SERPL-MCNC: 4.1 MG/DL (ref 2.7–4.5)
PLATELET # BLD AUTO: 191 THOUSANDS/UL (ref 149–390)
PMV BLD AUTO: 11.8 FL (ref 8.9–12.7)
POTASSIUM SERPL-SCNC: 3.9 MMOL/L (ref 3.5–5.3)
PROCALCITONIN SERPL-MCNC: 0.12 NG/ML
PROT SERPL-MCNC: 5.6 G/DL (ref 6.4–8.2)
PROTHROMBIN TIME: 16 SECONDS (ref 11.8–14.2)
RBC # BLD AUTO: 3.61 MILLION/UL (ref 3.81–5.12)
SODIUM SERPL-SCNC: 140 MMOL/L (ref 136–145)
WBC # BLD AUTO: 28.07 THOUSAND/UL (ref 4.31–10.16)

## 2018-10-08 PROCEDURE — 85025 COMPLETE CBC W/AUTO DIFF WBC: CPT | Performed by: PHYSICIAN ASSISTANT

## 2018-10-08 PROCEDURE — 83605 ASSAY OF LACTIC ACID: CPT | Performed by: PHYSICIAN ASSISTANT

## 2018-10-08 PROCEDURE — 85730 THROMBOPLASTIN TIME PARTIAL: CPT | Performed by: PHYSICIAN ASSISTANT

## 2018-10-08 PROCEDURE — 83735 ASSAY OF MAGNESIUM: CPT | Performed by: PHYSICIAN ASSISTANT

## 2018-10-08 PROCEDURE — 87147 CULTURE TYPE IMMUNOLOGIC: CPT | Performed by: INTERNAL MEDICINE

## 2018-10-08 PROCEDURE — 71045 X-RAY EXAM CHEST 1 VIEW: CPT

## 2018-10-08 PROCEDURE — 80053 COMPREHEN METABOLIC PANEL: CPT | Performed by: PHYSICIAN ASSISTANT

## 2018-10-08 PROCEDURE — 84100 ASSAY OF PHOSPHORUS: CPT | Performed by: PHYSICIAN ASSISTANT

## 2018-10-08 PROCEDURE — 87081 CULTURE SCREEN ONLY: CPT | Performed by: INTERNAL MEDICINE

## 2018-10-08 PROCEDURE — 85610 PROTHROMBIN TIME: CPT | Performed by: PHYSICIAN ASSISTANT

## 2018-10-08 PROCEDURE — 99232 SBSQ HOSP IP/OBS MODERATE 35: CPT | Performed by: INTERNAL MEDICINE

## 2018-10-08 RX ADMIN — LEVOCARNITINE 750 MG: 1 SOLUTION ORAL at 23:13

## 2018-10-08 RX ADMIN — VALPROIC ACID 250 MG: 500 SOLUTION ORAL at 18:04

## 2018-10-08 RX ADMIN — Medication 2000 MG: at 05:22

## 2018-10-08 RX ADMIN — LEVOCARNITINE 750 MG: 1 SOLUTION ORAL at 08:41

## 2018-10-08 RX ADMIN — LEVETIRACETAM 1000 MG: 100 SOLUTION ORAL at 00:27

## 2018-10-08 RX ADMIN — PHENOBARBITAL 60 MG: 20 LIQUID ORAL at 23:13

## 2018-10-08 RX ADMIN — PERAMPANEL 8 MG: 4 TABLET ORAL at 00:44

## 2018-10-08 RX ADMIN — HEPARIN SODIUM 5000 UNITS: 5000 INJECTION, SOLUTION INTRAVENOUS; SUBCUTANEOUS at 23:13

## 2018-10-08 RX ADMIN — LACTULOSE 30 G: 10 SOLUTION ORAL at 23:13

## 2018-10-08 RX ADMIN — LEVOCARNITINE 750 MG: 1 SOLUTION ORAL at 00:35

## 2018-10-08 RX ADMIN — LEVETIRACETAM 1000 MG: 100 SOLUTION ORAL at 23:14

## 2018-10-08 RX ADMIN — LEVOCARNITINE 750 MG: 1 SOLUTION ORAL at 18:03

## 2018-10-08 RX ADMIN — LACTOBACILLUS ACIDOPHILUS / LACTOBACILLUS BULGARICUS 1 PACKET: 100 MILLION CFU STRENGTH GRANULES at 23:13

## 2018-10-08 RX ADMIN — TOPIRAMATE 200 MG: 100 TABLET, FILM COATED ORAL at 23:13

## 2018-10-08 RX ADMIN — LACTOBACILLUS ACIDOPHILUS / LACTOBACILLUS BULGARICUS 1 PACKET: 100 MILLION CFU STRENGTH GRANULES at 18:03

## 2018-10-08 RX ADMIN — RUFINAMIDE 1200 MG: 200 TABLET, FILM COATED ORAL at 00:37

## 2018-10-08 RX ADMIN — LACTOBACILLUS ACIDOPHILUS / LACTOBACILLUS BULGARICUS 1 PACKET: 100 MILLION CFU STRENGTH GRANULES at 08:41

## 2018-10-08 RX ADMIN — LACTULOSE 30 G: 10 SOLUTION ORAL at 18:03

## 2018-10-08 RX ADMIN — Medication 2000 MG: at 18:06

## 2018-10-08 RX ADMIN — VALPROIC ACID 250 MG: 500 SOLUTION ORAL at 02:50

## 2018-10-08 RX ADMIN — PERAMPANEL 8 MG: 4 TABLET ORAL at 23:14

## 2018-10-08 RX ADMIN — VANCOMYCIN HYDROCHLORIDE 750 MG: 750 INJECTION, POWDER, LYOPHILIZED, FOR SOLUTION INTRAVENOUS at 23:15

## 2018-10-08 RX ADMIN — TOPIRAMATE 200 MG: 100 TABLET, FILM COATED ORAL at 08:40

## 2018-10-08 RX ADMIN — HEPARIN SODIUM 5000 UNITS: 5000 INJECTION, SOLUTION INTRAVENOUS; SUBCUTANEOUS at 15:22

## 2018-10-08 RX ADMIN — VALPROIC ACID 250 MG: 500 SOLUTION ORAL at 23:14

## 2018-10-08 RX ADMIN — HEPARIN SODIUM 5000 UNITS: 5000 INJECTION, SOLUTION INTRAVENOUS; SUBCUTANEOUS at 06:26

## 2018-10-08 RX ADMIN — RUFINAMIDE 1200 MG: 200 TABLET, FILM COATED ORAL at 18:03

## 2018-10-08 RX ADMIN — VANCOMYCIN HYDROCHLORIDE 750 MG: 750 INJECTION, POWDER, LYOPHILIZED, FOR SOLUTION INTRAVENOUS at 15:22

## 2018-10-08 RX ADMIN — LACTULOSE 30 G: 10 SOLUTION ORAL at 08:40

## 2018-10-08 RX ADMIN — VALPROIC ACID 250 MG: 500 SOLUTION ORAL at 00:32

## 2018-10-08 RX ADMIN — LEVETIRACETAM 1000 MG: 100 SOLUTION ORAL at 08:42

## 2018-10-08 RX ADMIN — VANCOMYCIN HYDROCHLORIDE 750 MG: 1 INJECTION, POWDER, LYOPHILIZED, FOR SOLUTION INTRAVENOUS at 06:27

## 2018-10-08 NOTE — H&P
H&P- Alexx Carcamo SJYMGB 1980, 41 y o  female MRN: [id number]    Unit/Bed#: 403-01 Encounter: [id number]    Primary Care Provider: Maryjo Robertson DO   Date and time admitted to hospital: 10/7/2018  3:13 PM        Lactic acidosis   Assessment & Plan    - possible etiologies include seizure activity, and/or aspiration pneumonia  - patient was volume resuscitated  - continue to monitor and trend lactic acids q 3 hours     Encephalopathy   Assessment & Plan    - rule out postictal state from possible seizure activity       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with tonic seizures ([facility] [address] )   Assessment & Plan    - monitor for seizure activity  - apply seizure pads to bed  - monitor and protect airway  - continue anti epileptic meds as ordered from home regimen  - medication therapeutic levels obtained     Intellectual disability   Assessment & Plan    - this is a chronic state  - provide physical and emotional support     * Aspiration pneumonia due to inhalation of vomitus ([facility] [address] )   Assessment & Plan    - report given to the emergency department attending was that staff at nursing home was giving a liquid orally to the patient, she vomited, then possibly aspirated    - chest x-ray is negative at this time  - lactic acid noted to be 4 4, repeat 3 9  - admit for possible aspiration pneumonia  - started on empiric antibiotics  - repeat chest x-ray in the morning  - monitor and trend serial lactic acids  - suction as needed  - obtain sputum culture         History and Physical - [facility]    Patient Information: Ирина Turk 41 y o  female MRN: [id number]  Unit/Bed#: 403-01 Encounter: [id number]  Admitting Physician: Marquis Toney PA-C  PCP: Maryjo Robertson DO  Date of Admission:  10/07/18    Assessment/Plan:    Hospital Problem List:     Principal Problem:    Aspiration pneumonia due to inhalation of vomitus ([address] )  Active Problems:    Lactic acidosis    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with tonic seizures (HCC)    Encephalopathy Intellectual disability          VTE Prophylaxis: Heparin  / reason for no mechanical VTE prophylaxis Bilateral lower extremity contractures   Code Status:  Full  POLST: There is no POLST form on file for this patient (pre-hospital)    Anticipated Length of Stay:  Patient will be admitted on an Inpatient basis with an anticipated length of stay of  > 2 midnights  Justification for Hospital Stay:  Possible aspiration pneumonia / lactic acidosis    Total Time for Visit, including Counseling / Coordination of Care: 30 minutes  Greater than 50% of this total time spent on direct patient counseling and coordination of care  Chief Complaint:   Possible aspiration pneumonia / lactic acidosis    History of Present Illness:    Michael Cabello is a 41 y o  female with a past medical history of Lennox-Gastaut Syndrome, intellectual disabilities, contractures all 4 extremities, chronic PEG tube, dysphagia, nonverbal, and others as listed below who presented to the emergency department from home [address] [place] [address],8 after, as per emergency department attending, reported that staff at that facility was attempting to give something oral to the patient resulting in her vomiting and possibly aspirating  Oxygen saturation is at that facility were reported to be 85% on room air, EMS was called, patient was transported to this facility  Upon arrival at this facility, this patient was noted to be more lethargic than what emergency department staff noticed her to be (patient does come to this emergency department frequently, and is well known to the staff here)  Upon arrival laboratory and radiologic studies were obtained, with the following results:  Lactic acid initially 2 4, that was noted to be 4 4, received 2 L lactated Ringer's, repeat lactate was 3 9, chest x-ray was obtained which was without any areas of consolidation at the time of x-ray    Upon the services exam patient was noted to be conscious and alert, nonverbal, all 4 extremities grossly contractured, lungs equal clear bilaterally without adventitious noises noted, PEG tube in place  Patient did receive a cefepime 2 g IV x1 dose, and vancomycin 750 mg IV x1 dose  Patient to be admitted to medical-surgical floor for continuation of care  Review of Systems:    Review of Systems   Unable to perform ROS: Patient nonverbal       Past Medical and Surgical History:     Past Medical History:   Diagnosis Date    ADHD     Anoxic brain damage ([facility] [address] )     Autistic disorder     Hyperammonemia (HCC)     Hyperkeratosis     Hypotension     Intellectual disability     Intellectual disability     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with tonic seizures (HCC)     Lethargy     Liver enzyme elevation     Onychomycosis     Osteoporosis     Osteoporosis     Psychiatric disorder     anxiety       Past Surgical History:   Procedure Laterality Date    ABDOMINAL SURGERY      CARDIAC PACEMAKER PLACEMENT      JEJUNOSTOMY FEEDING TUBE      PEG TUBE PLACEMENT         Meds/Allergies:    Prior to Admission medications    Medication Sig Start Date End Date Taking?  Authorizing Provider   acetaminophen (TYLENOL) 325 mg tablet 650 mg by Per G Tube route every 6 (six) hours as needed for mild pain    Historical Provider, MD   bisacodyl (FLEET) 10 MG/30ML ENEM Insert 10 mg into the rectum as needed for constipation    Historical Provider, MD   lactulose 20 g/30 mL 45 mL (30 g total) by Per G Tube route 3 (three) times a day 8/24/18   Mikey Houston MD   levETIRAcetam (KEPPRA) 100 mg/mL oral solution 10 mL (1,000 mg total) by Per G Tube route every 12 (twelve) hours for 30 days 9/4/18 10/4/18  Alexandria Lopez MD   levOCARNitine (CARNITOR) 1 g/10 mL solution 7 5 mL (750 mg total) by Per G Tube route 3 (three) times a day 9/4/18   Alexandria Lopez MD   magnesium hydroxide (MILK OF MAGNESIA) 400 mg/5 mL oral suspension Take 30 mL by mouth daily as needed for constipation    Historical Provider, MD MELATONIN PO 6 mg by Per G Tube route daily at bedtime    Historical Provider, MD   Multiple Vitamins-Minerals (CENTRUM SILVER PO) 1 tablet by Per G Tube route daily    Historical Provider, MD   ondansetron (ZOFRAN) 4 mg tablet 4 mg by Per G Tube route every 8 (eight) hours as needed for nausea or vomiting      Historical Provider, MD queenl [facility]) oral suspension 16 mL (8 mg total) by Per NG Tube route daily at bedtime Per G tube 9/4/18   Nelson Quezada MD   PHENobarbital 20 mg/5 mL elixir Take 15 mL (60 mg total) by mouth daily at bedtime 9/4/18   Nelson Quezada MD   potassium chloride 10 % 20 mEq by Per G Tube route daily      Historical Provider, MD   Probiotic Product (ALEXANDER-BID PROBIOTIC PO) 1 tablet by Per G Tube route daily      Historical Provider, MD   rufinamide (BANZEL) 400 mg tablet 3 tablets (1,200 mg total) by Per G Tube route 2 (two) times a day 9/4/18   Nelson Quezada MD   Sennosides-Docusate Sodium (SENEXON-S PO) 1 tablet by Per G Tube route daily      Historical Provider, MD   topiramate (TOPAMAX) 200 MG tablet Take 1 tablet (200 mg total) by mouth every 12 (twelve) hours 9/4/18   Nelson Quezada MD   valproic acid (DEPAKENE) 250 MG/5ML soln 5 mL (250 mg total) by Per G Tube route every 8 (eight) hours 9/4/18   Nelson Quezada MD     I have reviewed home medications with patient personally  Allergies:    Allergies   Allergen Reactions    Felbamate        Social History:     Marital Status: Single   Occupation:  Disabled  Patient Pre-hospital Living Situation:  Skilled nursing facility  Patient Pre-hospital Level of Mobility:  Non mobile  Patient Pre-hospital Diet Restrictions:  Enteric feeds  Substance Use History:   History   Alcohol Use No     History   Smoking Status    Never Smoker   Smokeless Tobacco    Never Used     History   Drug Use No       Family History:    non-contributory    Physical Exam:     Vitals:   Blood Pressure: 109/65 (10/07/18 2045)  Pulse: (!) 119 (10/07/18 2045)  Temperature: (!) 101 7 °F (38 7 °C) (10/07/18 2057)  Temp Source: Tympanic (10/07/18 2057)  Respirations: 20 (10/07/18 2045)  Height: 5' (152 4 cm) (10/07/18 2045)  Weight - Scale: 45 2 kg (99 lb 10 4 oz) (10/07/18 2045)  SpO2: 92 % (10/07/18 2045)    Physical Exam   Constitutional: She appears cachectic  No distress  HENT:   Head: Normocephalic and atraumatic  Mouth/Throat: Oropharynx is clear and moist  No oropharyngeal exudate  Eyes: Pupils are equal, round, and reactive to light  Conjunctivae are normal  No scleral icterus  Neck: Normal range of motion  No JVD present  No tracheal deviation present  No thyromegaly present  Cardiovascular: Normal rate, regular rhythm and normal heart sounds  Pulmonary/Chest: Effort normal and breath sounds normal  No stridor  No respiratory distress  She has no wheezes  She has no rales  She exhibits no tenderness  Abdominal: Soft  Bowel sounds are normal  She exhibits no distension and no mass  There is no tenderness  There is no guarding  Musculoskeletal: She exhibits deformity  She exhibits no edema or tenderness  Arms:       Legs:  1  All 4 extremities grossly contractured   Lymphadenopathy:     She has no cervical adenopathy  Neurological: She is alert  Skin: Skin is warm and dry  No rash noted  She is not diaphoretic  No erythema  No pallor  Additional Data:     Lab Results: I have personally reviewed pertinent reports          Results from last 7 days  Lab Units 10/07/18  1552   WBC Thousand/uL 6 93   HEMOGLOBIN g/dL 14 7   HEMATOCRIT % 45 0   PLATELETS Thousands/uL 229   NEUTROS PCT % 68   LYMPHS PCT % 22   MONOS PCT % 8   EOS PCT % 1       Results from last 7 days  Lab Units 10/07/18  1810   SODIUM mmol/L 140   POTASSIUM mmol/L 5 2   CHLORIDE mmol/L 104   CO2 mmol/L 28   BUN mg/dL 14   CREATININE mg/dL 0 54*   CALCIUM mg/dL 9 4   ALK PHOS U/L 137*   ALT U/L 88*   AST U/L 95*       Results from last 7 days  Lab Units 10/07/18  1552   INR  1 09       Imaging: I have personally reviewed pertinent reports  Xr Abdomen 1 View Portable    Result Date: 9/19/2018  Narrative: ABDOMEN INDICATION:   G tube placement  COMPARISON:  None VIEWS:  AP and lateral Images: 2 FINDINGS: Following injection of contrast through the indwelling gastrostomy tube, supine and lateral views of the abdomen were obtained  The gastrostomy tube is located in the gastric body, patent  Contrast is noted in the stomach and duodenum  No extravasation of contrast is seen  There is a nonobstructive bowel gas pattern  No discernible free air on this supine study  Upright or left lateral decubitus imaging is more sensitive to detect subtle free air in the appropriate setting  No pathologic calcifications or soft tissue masses  Visualized lung bases are clear  Thoracolumbar dextroscoliosis  Impression: Gastrostomy tube tip in stomach, patent without extravasation  Workstation performed: [id number]       EKG, Pathology, and Other Studies Reviewed on Admission:   · EKG: SR    Allscripts / Epic Records Reviewed: Yes     ** Please Note: This note has been constructed using a voice recognition system   **

## 2018-10-08 NOTE — CASE MANAGEMENT
Initial Clinical Review    Admission: Date/Time/Statement: 10/7/18 @ 1936     Orders Placed This Encounter   Procedures    Inpatient Admission     Standing Status:   Standing     Number of Occurrences:   1     Order Specific Question:   Admitting Physician     Answer:   Arlette Chambers     Order Specific Question:   Level of Care     Answer:   Med Surg [16]     Order Specific Question:   Estimated length of stay     Answer:   More than 2 Midnights     Order Specific Question:   Certification     Answer:   I certify that inpatient services are medically necessary for this patient for a duration of greater than two midnights  See H&P and MD Progress Notes for additional information about the patient's course of treatment  ED: Date/Time/Mode of Arrival:   ED Arrival Information     Expected Arrival Acuity Means of Arrival Escorted By Service Admission Type    - 10/7/2018 15:05 [facility] Hospitalist Emergency    Arrival Complaint    -          Chief Complaint:   Chief Complaint   Patient presents with    Aspiration     nursing home states that they think pt has aspirated  pt is acting appropriately according to home  History of Illness: 41 y o  female with a past medical history of Lennox-Gastaut Syndrome, intellectual disabilities, contractures all 4 extremities, chronic PEG tube, dysphagia, nonverbal, and others as listed below who presented to the ED from home [address] [place] [address],8 after, as per ED attending, reported that staff at that facility was attempting to give something oral to the patient resulting in her vomiting and possibly aspirating  Oxygen saturation is at that facility were reported to be 85% on room air, EMS was called, patient was transported to this facility    Upon arrival at this facility, this patient was noted to be more lethargic than what ED staff noticed her to be (patient does come to this emergency department frequently, and is well known to the staff here)  ED Vital Signs:   ED Triage Vitals   Temperature Pulse Respirations Blood Pressure SpO2   10/07/18 1521 10/07/18 1521 10/07/18 1521 10/07/18 1521 10/07/18 1521   98 °F (36 7 °C) 96 18 99/70 95 %      Temp Source Heart Rate Source Patient Position - Orthostatic VS BP Location FiO2 (%)   10/07/18 1521 10/07/18 1521 10/07/18 2045 10/07/18 2045 --   Temporal Monitor Lying Left arm       Pain Score       10/07/18 1521       No Pain        Wt Readings from Last 1 Encounters:   10/08/18 45 7 kg (100 lb 12 oz)       Vital Signs (abnormal): T , HR , RR 18-21, BP 80//60    Abnormal Labs/Diagnostic Test Results:   Lab Units 10/07/18  1810   SODIUM mmol/L 140   POTASSIUM mmol/L 5 2   CHLORIDE mmol/L 104   CO2 mmol/L 28   BUN mg/dL 14   CREATININE mg/dL 0 54*   CALCIUM mg/dL 9 4   ALK PHOS U/L 137*   ALT U/L 88*   AST U/L 95*      Ref Range & Units 10/07/18 1552   LACTIC ACID 0 5 - 2 0 mmol/L 2 4        Ref Range & Units 10/07/18 2214   Phenobarbital Lvl 15 0 - 40 0 ug/mL 13 2       Valproic level - (P)  Blood cxs -- (P)    CXR -- No acute cardiopulmonary disease  ED Treatment:   Medication Administration from 10/07/2018 1505 to 10/07/2018 2028       Date/Time Order Dose Route Action     10/07/2018 1623 lactated ringers bolus 1,000 mL 1,000 mL Intravenous New Bag     10/07/2018 1624 lactated ringers bolus 1,000 mL 1,000 mL Intravenous New Bag     10/07/2018 1537 cefepime (MAXIPIME) 2,000 mg in dextrose 5 % 50 mL IVPB 2,000 mg Intravenous Not Given     10/07/2018 1811 vancomycin (VANCOCIN) 750 mg in sodium chloride 0 9 % 250 mL IVPB 750 mg Intravenous New Bag     10/07/2018 1623 cefepime (MAXIPIME) IVPB (premix) 2,000 mg 2,000 mg Intravenous New Bag       Past Medical/Surgical History:    Active Ambulatory Problems     Diagnosis Date Noted    Dysphagia 02/22/2017    Acute encephalopathy 02/23/2017    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with tonic seizures ([facility] [address] ) 07/06/2017    Lactic acidosis 07/06/2017    Gastrostomy tube obstruction ([facility] ) 07/14/2017    Excessive daytime sleepiness 11/22/2017    Underweight 11/22/2017    Intellectual disability 11/22/2017    Hyperammonemia (HCC) 11/23/2017    Acute DANIEL (middle ear effusion) 11/23/2017    Macrocytic anemia 11/24/2017    Acute respiratory failure with hypercapnia (HCC) 12/02/2017    Hypernatremia 12/06/2017    Osteoporosis 09/17/2013    Onychomycosis 09/17/2013    Lethargy 10/10/2016    Hyperkeratosis 09/17/2013    Elevated liver enzymes 06/21/2016    ADHD, 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    Intractable epilepsy without status epilepticus ([facility] [address] ) 04/23/2018    Somnolence 08/18/2018    Acute hepatic encephalopathy 08/18/2018    Transaminitis 08/18/2018    Hypotension 08/20/2018    Incontinence 08/23/2018    Sepsis due to undetermined organism ([facility] [address] ) 09/07/2018     Past Medical History:   Diagnosis Date    ADHD     Anoxic brain damage (HCC)     Autistic disorder     Hyperammonemia (HCC)     Hyperkeratosis     Hypotension     Intellectual disability     Intellectual disability     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with tonic seizures (HCC)     Lethargy     Liver enzyme elevation     Onychomycosis     Osteoporosis     Osteoporosis     Psychiatric disorder        Admitting Diagnosis: Vomiting [R11 10]    Age/Sex: 41 y o  female    Assessment/Plan:   Lactic acidosis   Assessment & Plan     - possible etiologies include seizure activity, and/or aspiration pneumonia  - patient was volume resuscitated  - continue to monitor and trend lactic acids q 3 hours      Encephalopathy   Assessment & Plan     - rule out postictal state from possible seizure activity         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with tonic seizures (HCC)   Assessment & Plan     - monitor for seizure activity  - apply seizure pads to bed  - monitor and protect airway  - continue anti epileptic meds as ordered from home regimen  - medication therapeutic levels obtained    Intellectual disability   Assessment & Plan     - this is a chronic state  - provide physical and emotional support      * Aspiration pneumonia due to inhalation of vomitus [facility])   Assessment & Plan     - report given to the emergency department attending was that staff at nursing home was giving a liquid orally to the patient, she vomited, then possibly aspirated  - chest x-ray is negative at this time  - lactic acid noted to be 4 4, repeat 3 9  - admit for possible aspiration pneumonia  - started on empiric antibiotics  - repeat chest x-ray in the morning  - monitor and trend serial lactic acids  - suction as needed  - obtain sputum culture            Admission Orders:  M/S/Tele unit  Telem  Seizure precautions  lactic acid q3h x2  Monitor labs  Jevity 1 2 shaneka 45 ml cont  , with 50 ml H20 q4h  Monitor I&O's, daily weights    Scheduled Meds:   Current Facility-Administered Medications:  acetaminophen 500 mg Per PEG Tube Q4H PRN Chi Rumpf, PA-C    cefepime 2,000 mg Intravenous Q12H Chi Rumpf, PA-C Last Rate: 2,000 mg (10/08/18 0522)   heparin (porcine) 5,000 Units Subcutaneous [facility] Chi Rumpf, PA-C    lactobacillus acidophilus-bulgaricus 1 packet Oral TID Chi Rumpf, PA-C    lactulose 30 g Per G Tube TID Chi Rumpf, PA-C    levETIRAcetam 1,000 mg Per G Tube Q12H [facility] Rey Guerrero PA-C    levOCARNitine 750 mg Per G Tube TID Chi Rumpf, PA-C    Perampanel 8 mg Per G Tube HS Chi Rumpf, PA-C    PHENobarbital 60 mg Oral HS Chi Rumpf, PA-C    rufinamide 1,200 mg Per G Tube BID With Meals Chi Rumpf, PA-C    sodium chloride 50 mL/hr Intravenous Continuous Chi Rumpf, PA-C Last Rate: 50 mL/hr (10/07/18 2037)   topiramate 200 mg Oral Q12H [facility] Chi Rumpf, PA-C    valproic acid 250 mg Per G Tube Q8H Chi Rumpf, PA-C    vancomycin 15 mg/kg Intravenous [facility] Chi Jerome PA-C      Continuous Infusions:   sodium chloride 50 mL/hr Last Rate: 50 mL/hr (10/07/18 2037) PRN Meds:   acetaminophen

## 2018-10-08 NOTE — ASSESSMENT & PLAN NOTE
- possible etiologies include seizure activity, and/or aspiration pneumonia  - patient was volume resuscitated  - continue to monitor and trend lactic acids q 3 hours

## 2018-10-08 NOTE — ED PROCEDURE NOTE
PROCEDURE  ECG 12 Lead Documentation  Date/Time: 10/7/2018 4:11 PM  Performed by: Heather Bean by: Kirsten Mena     Indications / Diagnosis:  Sob, lethargic, tachy  ECG reviewed by me, the ED Provider: yes    Patient location:  ED  Previous ECG:     Comparison to cardiac monitor: Yes    Interpretation:     Interpretation: non-specific    Rate:     ECG rate:  94    ECG rate assessment: normal    Rhythm:     Rhythm: sinus rhythm    Ectopy:     Ectopy: none    QRS:     QRS axis:  Normal    QRS intervals:  Normal  Conduction:     Conduction: normal    ST segments:     ST segments:  Normal  T waves:     T waves: normal    Other findings:     Other findings: Oralia Hatchet, MD  10/08/18 [id number]

## 2018-10-08 NOTE — ASSESSMENT & PLAN NOTE
- report given to the emergency department attending was that staff at nursing [place] was giving a liquid orally to the patient, she vomited, then possibly aspirated    - initial chest x-ray as well as repeat chest x-ray is negative for acute infiltrate     - lactic acid level trended down and is stable, no further lactic acid level trending as needed, patient is hemodynamically stable      Continue with broad-spectrum antibiotics given the significant leukocytosis as well as lactic acidosis, procalcitonin level normal will repeat level tomorrow

## 2018-10-08 NOTE — PHYSICIAN ADVISOR
Current patient class: Inpatient  The patient is currently on Hospital Day: 2 at [address]        The patient was admitted to the hospital  on 10/7/18 at 1936 for the following diagnosis:  Vomiting [R11 10]       There is documentation in the medical record of an expected length of stay of at least 2 midnights  The patient is therefore expected to satisfy the 2 midnight benchmark and given the 2 midnight presumption is appropriate for INPATIENT ADMISSION  Given this expectation of a satisfying stay, CMS instructs us that the patient is most often appropriate for inpatient admission under part A provided medical necessity is documented in the chart  After review of the relevant documentation, labs, vital signs and test results, the patient is appropriate for INPATIENT ADMISSION  Admission to the hospital as an inpatient is a complex decision making process which requires the practitioner to consider the patients presenting complaint, history and physical examination and all relevant testing  With this in mind, in this case, the patient was deemed appropriate for INPATIENT ADMISSION  After review of the documentation and testing available at the time of the admission I concur with this clinical determination of medical necessity  The patient does have an inpatient admission within the previous 30 days  The patient was admitted on 9/18/18 and discharged on 9/19/18 as an inpatient  The patient therefore required readmission review  Rationale is as follows: The patient is a 41 yrs   Female who presented to the ED at 10/7/2018  3:13 PM with a chief complaint of Aspiration (nursing home states that they think pt has aspirated  pt is acting appropriately according to home  )     Patient admitted with a report of possible aspiration while being fed at her living facility  There was a concern fo a decrease in her mental status and IV antibiotics were initiated    She has a medical history of aspiration pneumonia and a seizure disorder  She was recently hospitalized for aspiration pneumonia  This patient has a significant medical history and this is another unfortunate circumstance for her but this admission appears to be directly related to her existing conditions  It would be appropriate to consider this admission as RELATED to the previous admission      The patients vitals on arrival were ED Triage Vitals   Temperature Pulse Respirations Blood Pressure SpO2   10/07/18 1521 10/07/18 1521 10/07/18 1521 10/07/18 1521 10/07/18 1521   98 °F (36 7 °C) 96 18 99/70 95 %      Temp Source Heart Rate Source Patient Position - Orthostatic VS BP Location FiO2 (%)   10/07/18 1521 10/07/18 1521 10/07/18 2045 10/07/18 2045 --   Temporal Monitor Lying Left arm       Pain Score       10/07/18 1521       No Pain           Past Medical History:   Diagnosis Date    ADHD     Anoxic brain damage (HCC)     Autistic disorder     Hyperammonemia (HCC)     Hyperkeratosis     Hypotension     Intellectual disability     Intellectual disability     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with tonic seizures (HCC)     Lethargy     Liver enzyme elevation     Onychomycosis     Osteoporosis     Osteoporosis     Psychiatric disorder     anxiety     Past Surgical History:   Procedure Laterality Date    ABDOMINAL SURGERY      CARDIAC PACEMAKER PLACEMENT      JEJUNOSTOMY FEEDING TUBE      PEG TUBE PLACEMENT             Consults have been placed to:   None    Vitals:    10/07/18 2328 10/08/18 0543 10/08/18 0720 10/08/18 1531   BP: 108/74  (!) 80/56 95/67   BP Location: Left arm  Left arm Left arm   Pulse: 102  84 89   Resp: 18  18 18   Temp: 99 2 °F (37 3 °C)  98 3 °F (36 8 °C) 98 6 °F (37 °C)   TempSrc: Temporal  Temporal Temporal   SpO2: 92%  92% 95%   Weight:  45 7 kg (100 lb 12 oz)     Height:           Most recent labs:    Recent Labs      10/08/18   0338   WBC  28 07*   HGB  12 1   HCT  36 9   PLT  191   K 3 9   NA  140   CALCIUM  8 7   BUN  12   CREATININE  0 44*   INR  1 35*   AST  57*   ALT  64   ALKPHOS  110       Scheduled Meds:  Current Facility-Administered Medications:  acetaminophen 500 mg Per PEG Tube Q4H PRN Flori Crespo PA-C    cefepime 2,000 mg Intravenous Q12H Flori Crespo PA-C Last Rate: 2,000 mg (10/08/18 0522)   heparin (porcine) 5,000 Units Subcutaneous [facility] Flori Crespo PA-C    lactobacillus acidophilus-bulgaricus 1 packet Oral TID Flori Crespo PA-C    lactulose 30 g Per G Tube TID Flori Crespo PA-C    levETIRAcetam 1,000 mg Per G Tube Q12H [facility] Rey Guerrero PA-C    levOCARNitine 750 mg Per G Tube TID Flori Crespo PA-C    Perampanel 8 mg Per G Tube HS Flori Crespo PA-C    PHENobarbital 60 mg Oral HS Flori Crespo PA-C    rufinamide 1,200 mg Per G Tube BID With Meals Flori Crespo PA-C    topiramate 200 mg Oral Q12H [facility] Flori Crespo PA-C    valproic acid 250 mg Per G Tube Q8H Flori Crespo PA-C    vancomycin 15 mg/kg [facility]ous [facility] Last Rate: 750 mg (10/08/18 1522)     Continuous Infusions:   PRN Meds:   acetaminophen    Surgical procedures (if appropriate):

## 2018-10-08 NOTE — ED NOTES
Pt resting in bed comfortably   Cleaned pt up covered in stool and urine     Lela Hodge, RN  10/07/18 2053

## 2018-10-08 NOTE — PROGRESS NOTES
Progress Note - Bassam Storm 1980, 41 y o  female MRN: [id number]    Unit/Bed#: 403-01 Encounter: [id number]    Primary Care Provider: Kwame St DO   Date and time admitted to hospital: 10/7/2018  3:13 PM        * Aspiration pneumonia due to inhalation of vomitus [facility])   Assessment & Plan    - report given to the emergency department attending was that staff at nursing home was giving a liquid orally to the patient, she vomited, then possibly aspirated  - initial chest x-ray as well as repeat chest x-ray is negative for acute infiltrate     - lactic acid level trended down and is stable, no further lactic acid level trending as needed, patient is hemodynamically stable      Continue with broad-spectrum antibiotics given the significant leukocytosis as well as lactic acidosis, procalcitonin level normal will repeat level tomorrow         Encephalopathy   Assessment & Plan    - patient appears to still be fairly lethargic, continue to monitor status  No further reports nausea vomiting or diarrhea,       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with tonic seizures (HCC)   Assessment & Plan    - monitor for seizure activity  - apply seizure pads to bed  - monitor and protect airway  - continue anti epileptic meds as ordered from home regimen  - medication therapeutic levels obtained     Intellectual disability   Assessment & Plan    - this is a chronic state  - provide physical and emotional support       VTE Pharmacologic Prophylaxis:   Pharmacologic: Heparin  Mechanical VTE Prophylaxis in Place: Yes    Patient Centered Rounds: I have performed bedside rounds with nursing staff today        Current Length of Stay: 1 day(s)    Current Patient Status: Inpatient   Certification Statement: The patient will continue to require additional inpatient hospital stay due to Need for broad-spectrum antibiotics pending further monitoring    Discharge Plan / Estimated Discharge Date:  10/10/2018      Code Status: Level 1 - Full Code      Subjective:   Patient appears lethargic  Objective:     Vitals:   Temp (24hrs), Av 2 °F (37 9 °C), Min:98 3 °F (36 8 °C), Max:103 °F (39 4 °C)    HR:  [] 89  Resp:  [18-21] 18  BP: ()/(56-74) 95/67  SpO2:  [90 %-99 %] 95 %  Body mass index is 19 68 kg/m²  Input and Output Summary (last 24 hours): Intake/Output Summary (Last 24 hours) at 10/08/18 1536  Last data filed at 10/08/18 7773   Gross per 24 hour   Intake             2799 ml   Output                0 ml   Net             2799 ml       Physical Exam:     Physical Exam   Constitutional: She is oriented to person, place, and time  Chronically ill-appearing female, no acute distress   Pulmonary/Chest: Effort normal and breath sounds normal  No respiratory distress  She has no wheezes  Abdominal: Soft  Bowel sounds are normal  She exhibits no distension  There is no tenderness  Neurological: She is alert and oriented to person, place, and time  No cranial nerve deficit  Coordination normal    Psychiatric: She has a normal mood and affect  Her behavior is normal  Judgment and thought content normal    Nursing note and vitals reviewed  Additional Data:     Labs:      Results from last 7 days  Lab Units 10/08/18  0338   WBC Thousand/uL 28 07*   HEMOGLOBIN g/dL 12 1   HEMATOCRIT % 36 9   PLATELETS Thousands/uL 191   NEUTROS PCT % 81*   LYMPHS PCT % 11*   MONOS PCT % 7   EOS PCT % 0       Results from last 7 days  Lab Units 10/08/18  0338   SODIUM mmol/L 140   POTASSIUM mmol/L 3 9   CHLORIDE mmol/L 105   CO2 mmol/L 30   BUN mg/dL 12   CREATININE mg/dL 0 44*   CALCIUM mg/dL 8 7   ALK PHOS U/L 110   ALT U/L 64   AST U/L 57*       Results from last 7 days  Lab Units 10/08/18  0338   INR  1 35*       * I Have Reviewed All Lab Data Listed Above  * Additional Pertinent Lab Tests Reviewed:  [facility]        Recent Cultures (last 7 days):           Last 24 Hours Medication List: Current Facility-Administered Medications:  acetaminophen 500 mg Per PEG Tube Q4H PRN Sonia Dopp, PA-C    cefepime 2,000 mg Intravenous Q12H Sonia Dopp, PA-C Last Rate: 2,000 mg (10/08/18 0522)   heparin (porcine) 5,000 Units Subcutaneous [facility] Sonia Dopp, PA-C    lactobacillus acidophilus-bulgaricus 1 packet Oral TID Sonia Dopp, PA-C    lactulose 30 g Per G Tube TID Sonia Dopp, PA-C    levETIRAcetam 1,000 mg Per G Tube Q12H [address] Sonia Dopp, PA-C    levOCARNitine 750 mg Per G Tube TID Sonia Dopp, PA-C    Perampanel 8 mg Per G Tube HS Sonia Dopp, PA-C    PHENobarbital 60 mg Oral HS Sonia Dopp, PA-C    rufinamide 1,200 mg Per G Tube BID With Meals Sonia Dopp, PA-C    topiramate 200 mg Oral Q12H [address] Sonia Dopp, PA-C    valproic acid 250 mg Per G Tube Q8H Sonia Dopp, PA-C    vancomycin 15 mg/kg Intravenous Ignacio Guerrero, WAGNER Last Rate: 750 mg (10/08/18 1522)        Today, Patient Was Seen By: Kentrell Hatch DO

## 2018-10-08 NOTE — ED NOTES
Pt covered in stool and urine  Cleaned patient up  Spoke with Dave Haile PA-C to do a C-dif sample verbal order to not send sample        Jasper Mckinney RN  10/07/18 2052

## 2018-10-08 NOTE — ED NOTES
Pt resting in bed comfortably  Pt covered in stool and urine  Cleaned patient up        Diane Cho RN  10/07/18 2051

## 2018-10-08 NOTE — PLAN OF CARE
Problem: DISCHARGE PLANNING - CARE MANAGEMENT  Goal: Discharge to post-acute care or home with appropriate resources  INTERVENTIONS:  - Conduct assessment to determine patient/family and health care team treatment goals, and need for post-acute services based on payer coverage, community resources, and patient preferences, and barriers to discharge  - Address psychosocial, clinical, and financial barriers to discharge as identified in assessment in conjunction with the patient/family and health care team  - Arrange appropriate level of post-acute services according to patient's   needs and preference and payer coverage in collaboration with the physician and health care team  - Communicate with and update the patient/family, physician, and health care team regarding progress on the discharge plan  - Arrange appropriate transportation to post-acute venues  Outcome: Progressing  -return to [facility] on dc  -outpatient follow up after dc

## 2018-10-08 NOTE — ASSESSMENT & PLAN NOTE
- patient appears to still be fairly lethargic, continue to monitor status    No further reports nausea vomiting or diarrhea,

## 2018-10-08 NOTE — ASSESSMENT & PLAN NOTE
- report given to the emergency department attending was that staff at nursing home was giving a liquid orally to the patient, she vomited, then possibly aspirated    - chest x-ray is negative at this time  - lactic acid noted to be 4 4, repeat 3 9  - admit for possible aspiration pneumonia  - started on empiric antibiotics  - repeat chest x-ray in the morning  - monitor and trend serial lactic acids  - suction as needed  - obtain sputum culture

## 2018-10-08 NOTE — NURSING NOTE
Patient arrived on the unit from the ED at this time    She was transferred to the  Bed with assistance of 4

## 2018-10-08 NOTE — ASSESSMENT & PLAN NOTE
- monitor for seizure activity  - apply seizure pads to bed  - monitor and protect airway  - continue anti epileptic meds as ordered from home regimen  - medication therapeutic levels obtained

## 2018-10-09 PROBLEM — R23.8 SKIN BREAKDOWN: Status: ACTIVE | Noted: 2018-10-09

## 2018-10-09 PROBLEM — A41.9 SEPSIS (HCC): Status: ACTIVE | Noted: 2018-10-09

## 2018-10-09 LAB
ALBUMIN SERPL BCP-MCNC: 2.1 G/DL (ref 3.5–5)
ALP SERPL-CCNC: 119 U/L (ref 46–116)
ALT SERPL W P-5'-P-CCNC: 66 U/L (ref 12–78)
ANION GAP SERPL CALCULATED.3IONS-SCNC: 5 MMOL/L (ref 4–13)
AST SERPL W P-5'-P-CCNC: 70 U/L (ref 5–45)
BASOPHILS # BLD AUTO: 0.08 THOUSANDS/ΜL (ref 0–0.1)
BASOPHILS NFR BLD AUTO: 1 % (ref 0–1)
BILIRUB SERPL-MCNC: 0.4 MG/DL (ref 0.2–1)
BUN SERPL-MCNC: 9 MG/DL (ref 5–25)
CALCIUM SERPL-MCNC: 8.4 MG/DL (ref 8.3–10.1)
CHLORIDE SERPL-SCNC: 105 MMOL/L (ref 100–108)
CO2 SERPL-SCNC: 27 MMOL/L (ref 21–32)
CREAT SERPL-MCNC: 0.47 MG/DL (ref 0.6–1.3)
EOSINOPHIL # BLD AUTO: 0.82 THOUSAND/ΜL (ref 0–0.61)
EOSINOPHIL NFR BLD AUTO: 6 % (ref 0–6)
ERYTHROCYTE [DISTWIDTH] IN BLOOD BY AUTOMATED COUNT: 13.1 % (ref 11.6–15.1)
GFR SERPL CREATININE-BSD FRML MDRD: 127 ML/MIN/1.73SQ M
GLUCOSE SERPL-MCNC: 108 MG/DL (ref 65–140)
HCT VFR BLD AUTO: 36.4 % (ref 34.8–46.1)
HGB BLD-MCNC: 12.1 G/DL (ref 11.5–15.4)
IMM GRANULOCYTES # BLD AUTO: 0.05 THOUSAND/UL (ref 0–0.2)
IMM GRANULOCYTES NFR BLD AUTO: 0 % (ref 0–2)
LYMPHOCYTES # BLD AUTO: 3.72 THOUSANDS/ΜL (ref 0.6–4.47)
LYMPHOCYTES NFR BLD AUTO: 28 % (ref 14–44)
MAGNESIUM SERPL-MCNC: 1.7 MG/DL (ref 1.6–2.6)
MCH RBC QN AUTO: 33.9 PG (ref 26.8–34.3)
MCHC RBC AUTO-ENTMCNC: 33.2 G/DL (ref 31.4–37.4)
MCV RBC AUTO: 102 FL (ref 82–98)
MONOCYTES # BLD AUTO: 0.99 THOUSAND/ΜL (ref 0.17–1.22)
MONOCYTES NFR BLD AUTO: 7 % (ref 4–12)
NEUTROPHILS # BLD AUTO: 7.79 THOUSANDS/ΜL (ref 1.85–7.62)
NEUTS SEG NFR BLD AUTO: 58 % (ref 43–75)
NRBC BLD AUTO-RTO: 0 /100 WBCS
PHOSPHATE SERPL-MCNC: 3.8 MG/DL (ref 2.7–4.5)
PLATELET # BLD AUTO: 186 THOUSANDS/UL (ref 149–390)
PMV BLD AUTO: 11.6 FL (ref 8.9–12.7)
POTASSIUM SERPL-SCNC: 3.9 MMOL/L (ref 3.5–5.3)
PROCALCITONIN SERPL-MCNC: 1.63 NG/ML
PROT SERPL-MCNC: 5.8 G/DL (ref 6.4–8.2)
RBC # BLD AUTO: 3.57 MILLION/UL (ref 3.81–5.12)
SODIUM SERPL-SCNC: 137 MMOL/L (ref 136–145)
VANCOMYCIN TROUGH SERPL-MCNC: 18.5 UG/ML (ref 10–20)
WBC # BLD AUTO: 13.45 THOUSAND/UL (ref 4.31–10.16)

## 2018-10-09 PROCEDURE — 85025 COMPLETE CBC W/AUTO DIFF WBC: CPT | Performed by: PHYSICIAN ASSISTANT

## 2018-10-09 PROCEDURE — 83735 ASSAY OF MAGNESIUM: CPT | Performed by: PHYSICIAN ASSISTANT

## 2018-10-09 PROCEDURE — 80202 ASSAY OF VANCOMYCIN: CPT | Performed by: PHYSICIAN ASSISTANT

## 2018-10-09 PROCEDURE — 87631 RESP VIRUS 3-5 TARGETS: CPT | Performed by: INTERNAL MEDICINE

## 2018-10-09 PROCEDURE — 84145 PROCALCITONIN (PCT): CPT | Performed by: INTERNAL MEDICINE

## 2018-10-09 PROCEDURE — 94760 N-INVAS EAR/PLS OXIMETRY 1: CPT

## 2018-10-09 PROCEDURE — 99232 SBSQ HOSP IP/OBS MODERATE 35: CPT | Performed by: INTERNAL MEDICINE

## 2018-10-09 PROCEDURE — 94664 DEMO&/EVAL PT USE INHALER: CPT

## 2018-10-09 PROCEDURE — 80053 COMPREHEN METABOLIC PANEL: CPT | Performed by: PHYSICIAN ASSISTANT

## 2018-10-09 PROCEDURE — 84100 ASSAY OF PHOSPHORUS: CPT | Performed by: PHYSICIAN ASSISTANT

## 2018-10-09 RX ORDER — MAGNESIUM SULFATE HEPTAHYDRATE 40 MG/ML
2 INJECTION, SOLUTION INTRAVENOUS ONCE
Status: COMPLETED | OUTPATIENT
Start: 2018-10-09 | End: 2018-10-09

## 2018-10-09 RX ORDER — SODIUM CHLORIDE 9 MG/ML
50 INJECTION, SOLUTION INTRAVENOUS CONTINUOUS
Status: DISCONTINUED | OUTPATIENT
Start: 2018-10-09 | End: 2018-10-13 | Stop reason: HOSPADM

## 2018-10-09 RX ORDER — ACETAMINOPHEN 160 MG/5ML
650 SUSPENSION, ORAL (FINAL DOSE FORM) ORAL EVERY 6 HOURS PRN
Status: DISCONTINUED | OUTPATIENT
Start: 2018-10-09 | End: 2018-10-13 | Stop reason: HOSPADM

## 2018-10-09 RX ADMIN — LEVOCARNITINE 750 MG: 1 SOLUTION ORAL at 10:06

## 2018-10-09 RX ADMIN — PHENOBARBITAL 60 MG: 20 LIQUID ORAL at 21:47

## 2018-10-09 RX ADMIN — VANCOMYCIN HYDROCHLORIDE 750 MG: 750 INJECTION, POWDER, LYOPHILIZED, FOR SOLUTION INTRAVENOUS at 14:49

## 2018-10-09 RX ADMIN — LACTULOSE 30 G: 10 SOLUTION ORAL at 10:05

## 2018-10-09 RX ADMIN — LACTULOSE 30 G: 10 SOLUTION ORAL at 19:08

## 2018-10-09 RX ADMIN — VALPROIC ACID 250 MG: 500 SOLUTION ORAL at 19:13

## 2018-10-09 RX ADMIN — LEVOCARNITINE 750 MG: 1 SOLUTION ORAL at 19:12

## 2018-10-09 RX ADMIN — VANCOMYCIN HYDROCHLORIDE 750 MG: 750 INJECTION, POWDER, LYOPHILIZED, FOR SOLUTION INTRAVENOUS at 21:49

## 2018-10-09 RX ADMIN — LACTULOSE 30 G: 10 SOLUTION ORAL at 21:47

## 2018-10-09 RX ADMIN — SODIUM CHLORIDE 50 ML/HR: 0.9 INJECTION, SOLUTION INTRAVENOUS at 13:31

## 2018-10-09 RX ADMIN — LACTOBACILLUS ACIDOPHILUS / LACTOBACILLUS BULGARICUS 1 PACKET: 100 MILLION CFU STRENGTH GRANULES at 10:08

## 2018-10-09 RX ADMIN — VALPROIC ACID 250 MG: 500 SOLUTION ORAL at 10:04

## 2018-10-09 RX ADMIN — LEVOCARNITINE 750 MG: 1 SOLUTION ORAL at 21:48

## 2018-10-09 RX ADMIN — HEPARIN SODIUM 5000 UNITS: 5000 INJECTION, SOLUTION INTRAVENOUS; SUBCUTANEOUS at 13:37

## 2018-10-09 RX ADMIN — Medication 2000 MG: at 05:33

## 2018-10-09 RX ADMIN — TOPIRAMATE 200 MG: 100 TABLET, FILM COATED ORAL at 10:08

## 2018-10-09 RX ADMIN — Medication 2000 MG: at 19:09

## 2018-10-09 RX ADMIN — VANCOMYCIN HYDROCHLORIDE 750 MG: 750 INJECTION, POWDER, LYOPHILIZED, FOR SOLUTION INTRAVENOUS at 06:26

## 2018-10-09 RX ADMIN — HEPARIN SODIUM 5000 UNITS: 5000 INJECTION, SOLUTION INTRAVENOUS; SUBCUTANEOUS at 21:48

## 2018-10-09 RX ADMIN — HEPARIN SODIUM 5000 UNITS: 5000 INJECTION, SOLUTION INTRAVENOUS; SUBCUTANEOUS at 05:33

## 2018-10-09 RX ADMIN — SODIUM CHLORIDE 50 ML/HR: 0.9 INJECTION, SOLUTION INTRAVENOUS at 13:32

## 2018-10-09 RX ADMIN — LEVETIRACETAM 1000 MG: 100 SOLUTION ORAL at 10:06

## 2018-10-09 RX ADMIN — LACTOBACILLUS ACIDOPHILUS / LACTOBACILLUS BULGARICUS 1 PACKET: 100 MILLION CFU STRENGTH GRANULES at 21:48

## 2018-10-09 RX ADMIN — LACTOBACILLUS ACIDOPHILUS / LACTOBACILLUS BULGARICUS 1 PACKET: 100 MILLION CFU STRENGTH GRANULES at 19:11

## 2018-10-09 RX ADMIN — LEVETIRACETAM 1000 MG: 100 SOLUTION ORAL at 21:48

## 2018-10-09 RX ADMIN — RUFINAMIDE 1200 MG: 200 TABLET, FILM COATED ORAL at 10:07

## 2018-10-09 RX ADMIN — MAGNESIUM SULFATE HEPTAHYDRATE 2 G: 40 INJECTION, SOLUTION INTRAVENOUS at 13:31

## 2018-10-09 NOTE — ASSESSMENT & PLAN NOTE
· Continue IV vancomycin IV cefepime  · Follow the procalcitonin level  · Follow lactic acid level until the lactic acidosis resolves  · Check Influenza/RSV testing  · Respiratory protocol  · Continue NPO status with tube feedings

## 2018-10-09 NOTE — RESPIRATORY THERAPY NOTE
RT Protocol Note  Chika Santacruz 41 y o  female MRN: [id number]  Unit/Bed#: 403-01 Encounter: [id number]    Assessment    Principal Problem:    Sepsis ([facility] [address] )  Active Problems:    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with tonic seizures (HCC)    Lactic acidosis    Transaminitis    Aspiration pneumonia ([facility] [address] )      Home Pulmonary Medications:  none       Past Medical History:   Diagnosis Date    ADHD     Anoxic brain damage (HCC)     Autistic disorder     Hyperammonemia (HCC)     Hyperkeratosis     Hypotension     Intellectual disability     Intellectual disability     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with tonic seizures (HCC)     Lethargy     Liver enzyme elevation     Onychomycosis     Osteoporosis     Osteoporosis     Psychiatric disorder     anxiety     Social History     Social History    Marital status: Single     Spouse name: N/A    Number of children: N/A    Years of education: N/A     Social History Main Topics    Smoking status: Never Smoker    Smokeless tobacco: Never Used    Alcohol use No    Drug use: No    Sexual activity: No     Other Topics Concern    None     Social History Narrative    None       Subjective         Objective    Physical Exam:   Assessment Type: Assess only  General Appearance: Awake, Alert  Respiratory Pattern: Normal  Chest Assessment: Chest expansion symmetrical  Bilateral Breath Sounds: Diminished    Vitals:  Blood pressure 98/69, pulse 83, temperature (!) 96 °F (35 6 °C), temperature source Tympanic, resp  rate 16, height 5' (1 524 m), weight 45 1 kg (99 lb 6 8 oz), SpO2 95 %  Imaging and other studies: I have personally reviewed pertinent reports  Plan    Respiratory Plan: No distress/Pulmonary history          Discontinue Respiratory protocol

## 2018-10-09 NOTE — ASSESSMENT & PLAN NOTE
· Seizure precautions  · Continue to monitor for any seizure activity  · Continue her current seizure medication regimen

## 2018-10-09 NOTE — ASSESSMENT & PLAN NOTE
· Probable evolving sepsis secondary to probable aspiration pneumonia  · Follow culture results  · Follow the procalcitonin level  · Continue IV vancomycin and IV cefepime for now  · Follow lactic acid level until the lactic acidosis resolves  · Initiate normal saline IV fluids at 50 mL/hr

## 2018-10-09 NOTE — ASSESSMENT & PLAN NOTE
· Possibly medication-related versus secondary to sepsis  · Avoid all hepatotoxic agents  · Check an acute hepatitis panel

## 2018-10-09 NOTE — ED PROVIDER NOTES
History  Chief Complaint   Patient presents with    Aspiration     nursing home states that they think pt has aspirated  pt is acting appropriately according to home  Patient: April Zamora AJNOXC  35 y o /female  YOB: 1980  MRN: [id number]  PCP: Jessie Montgomery DO  Date of evaluation: 10/7/2018    (N B   Voice-recognition software may have been used in the preparation of this document  Occasional wrong word or "sound-alike" substitutions may have occurred due to the inherent limitations of voice recognition software  Interpretation should be guided by context )    This patient presents to the emergency department via EMS from the nursing home where she resides  They reported to our staff that they feel that she may have aspirated  She was seen to vomit and then become short of breath  Pulse ox was reportedly in the mid 80s for them  On route her pulse ox was measured in the high 80s  This patient is well-known to the emergency department with many previous visits for illness and for the dislodgement of feeding tubes  History provided by:  Medical records, EMS personnel and nursing home  History limited by:  Patient nonverbal  Shortness of Breath   Severity:  Moderate  Onset quality:  Sudden  Timing:  Constant  Progression:  Unchanged  Chronicity:  New  Context comment:  Aspiration  Worsened by:  Nothing  Ineffective treatments:  None tried      Prior to Admission Medications   Prescriptions Last Dose Informant Patient Reported? Taking?    MELATONIN PO 10/6/2018 at 2100  Yes Yes   Si mg by Per G Tube route daily at bedtime   Multiple Vitamins-Minerals (CENTRUM SILVER PO) 10/6/2018 at 0900  Yes Yes   Si mL/mL by Per G Tube route daily     PHENobarbital 20 mg/5 mL elixir 10/6/2018 at 2100  No Yes   Sig: Take 15 mL (60 mg total) by mouth daily at bedtime   Probiotic Product (ALEXANDER-BID PROBIOTIC PO) 10/6/2018 at 0900  Yes Yes   Si tablet by Per G Tube route daily Sennosides-Docusate Sodium (SENEXON-S PO) 10/6/2018 at 0900  Yes Yes   Si tablet by Per G Tube route daily     acetaminophen (TYLENOL) 325 mg tablet Unknown at Unknown time  Yes No   Si mg by Per G Tube route every 6 (six) hours as needed for mild pain   bisacodyl (FLEET) 10 MG/30ML ENEM Unknown at Unknown time  Yes No   Sig: Insert 10 mg into the rectum as needed for constipation   lactulose 20 g/30 mL 10/7/2018 at 1300  No Yes   Si mL (30 g total) by Per G Tube route 3 (three) times a day   levETIRAcetam (KEPPRA) 100 mg/mL oral solution 10/6/2018 at 0900  No Yes   Sig: 10 mL (1,000 mg total) by Per G Tube route every 12 (twelve) hours for 30 days   levOCARNitine (CARNITOR) 1 g/10 mL solution 10/7/2018 at 1300  No Yes   Si 5 mL (750 mg total) by Per G Tube route 3 (three) times a day   magnesium hydroxide (MILK OF MAGNESIA) 400 mg/5 mL oral suspension Unknown at Unknown time  Yes No   Sig: Take 30 mL by mouth daily as needed for constipation   ondansetron (ZOFRAN) 4 mg tablet Unknown at Unknown time  Yes No   Si mg by Per G Tube route every 8 (eight) hours as needed for nausea or vomiting     perampanel (FYCOMPA) oral suspension 10/6/2018 at 2100  No Yes   Si mL (8 mg total) by Per NG Tube route daily at bedtime Per G tube   Patient taking differently: 20 mg by Per NG Tube route daily at bedtime Per G tube    potassium chloride 10 % 10/6/2018 at 0900  Yes Yes   Si mEq by Per G Tube route daily     rufinamide (BANZEL) 400 mg tablet 10/7/2018 at 0900  No Yes   Sig: 3 tablets (1,200 mg total) by Per G Tube route 2 (two) times a day   topiramate (TOPAMAX) 200 MG tablet 10/7/2018 at 0900  No Yes   Sig: Take 1 tablet (200 mg total) by mouth every 12 (twelve) hours   valproic acid (DEPAKENE) 250 MG/5ML soln 10/7/2018 at 1400  No Yes   Si mL (250 mg total) by Per G Tube route every 8 (eight) hours      Facility-Administered Medications: None       Past Medical History:   Diagnosis Date    ADHD     Anoxic brain damage (HCC)     Autistic disorder     Hyperammonemia (HCC)     Hyperkeratosis     Hypotension     Intellectual disability     Intellectual disability     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with tonic seizures (HCC)     Lethargy     Liver enzyme elevation     Onychomycosis     Osteoporosis     Osteoporosis     Psychiatric disorder     anxiety       Past Surgical History:   Procedure Laterality Date    ABDOMINAL SURGERY      CARDIAC PACEMAKER PLACEMENT      JEJUNOSTOMY FEEDING TUBE      PEG TUBE PLACEMENT         History reviewed  No pertinent family history  I have reviewed and agree with the history as documented  Social History   Substance Use Topics    Smoking status: Never Smoker    Smokeless tobacco: Never Used    Alcohol use No        Review of Systems   Unable to perform ROS: Patient nonverbal   Respiratory: Positive for shortness of breath  Physical Exam  Physical Exam   Constitutional: She appears well-developed and well-nourished  She appears lethargic  She appears cachectic  She appears ill  HENT:   Mouth/Throat: Oropharynx is clear and moist and mucous membranes are normal    Voice normal   Eyes: Pupils are equal, round, and reactive to light  EOM are normal    Cardiovascular: Normal rate and regular rhythm  Pulmonary/Chest: Effort normal  She has rhonchi  Abdominal: Soft  Bowel sounds are normal    Neurological: She appears lethargic  Pt usually moves in the bed and resists care  Today she makes no resistance to exam    Skin: Skin is warm and dry  Psychiatric:   Unable to assess   Nursing note and vitals reviewed        Vital Signs  ED Triage Vitals   Temperature Pulse Respirations Blood Pressure SpO2   10/07/18 1521 10/07/18 1521 10/07/18 1521 10/07/18 1521 10/07/18 1521   98 °F (36 7 °C) 96 18 99/70 95 %      Temp Source Heart Rate Source Patient Position - Orthostatic VS BP Location FiO2 (%)   10/07/18 1521 10/07/18 1521 10/07/18 2045 10/07/18 2045 --   Temporal Monitor Lying Left arm       Pain Score       10/07/18 1521       No Pain           Vitals:    10/07/18 2328 10/08/18 0720 10/08/18 1531 10/08/18 2339   BP: 108/74 (!) 80/56 95/67 92/61   Pulse: 102 84 89 80   Patient Position - Orthostatic VS: Lying Lying Lying Sitting       Visual Acuity  Visual Acuity      Most Recent Value   L Pupil Size (mm)  4   R Pupil Size (mm)  4          ED Medications  Medications   heparin (porcine) subcutaneous injection 5,000 Units (5,000 Units Subcutaneous Given 10/9/18 0533)   acetaminophen (TYLENOL) oral suspension 500 mg (500 mg Per PEG Tube Given 10/7/18 2036)   lactulose 20 g/30 mL oral solution 30 g (30 g Per G Tube Given 10/8/18 2313)   levETIRAcetam (KEPPRA) oral solution 1,000 mg (1,000 mg Per G Tube Given 10/8/18 2314)   levOCARNitine (CARNITOR) oral solution 750 mg (750 mg Per G Tube Given 10/8/18 2313)   PHENobarbital oral elixir 60 mg (60 mg Oral Given 10/8/18 2313)   topiramate (TOPAMAX) tablet 200 mg (200 mg Oral Given 10/8/18 2313)   valproic acid (DEPAKENE) 250 MG/5ML oral soln 250 mg (250 mg Per G Tube Given 10/8/18 2314)   Perampanel tablet 8 mg (8 mg Per G Tube Given 10/8/18 2314)   rufinamide (BANZEL) tablet 1,200 mg (1,200 mg Per G Tube Given 10/8/18 1803)   cefepime (MAXIPIME) 2,000 mg in dextrose 5 % 50 mL IVPB (2,000 mg Intravenous New Bag 10/9/18 0533)   lactobacillus acidophilus-bulgaricus (FLORANEX) packet 1 packet (1 packet Oral Given 10/8/18 2313)   vancomycin (VANCOCIN) 750 mg in sodium chloride 0 9 % 250 mL IVPB (750 mg Intravenous New Bag 10/9/18 0626)   lactated ringers bolus 1,000 mL (0 mL Intravenous Stopped 10/7/18 1653)     Followed by   lactated ringers bolus 1,000 mL (0 mL Intravenous Stopped 10/7/18 1654)   vancomycin (VANCOCIN) 750 mg in sodium chloride 0 9 % 250 mL IVPB (0 mg/kg × 44 kg Intravenous Stopped 10/7/18 1941)   cefepime (MAXIPIME) IVPB (premix) 2,000 mg (0 mg Intravenous Stopped 10/7/18 1653)   thiamine (VITAMIN B1) 200 mg in sodium chloride 0 9 % 50 mL IVPB (200 mg Intravenous New Bag 10/7/18 2211)       Diagnostic Studies  Results Reviewed     Procedure Component Value Units Date/Time    CBC and differential [[id number]]  (Abnormal) Collected:  10/09/18 0603    Lab Status:  Final result Specimen:  Blood from Arm, Left Updated:  10/09/18 0623     WBC 13 45 (H) Thousand/uL      RBC 3 57 (L) Million/uL      Hemoglobin 12 1 g/dL      Hematocrit 36 4 %       (H) fL      MCH 33 9 pg      MCHC 33 2 g/dL      RDW 13 1 %      MPV 11 6 fL      Platelets 333 Thousands/uL      nRBC 0 /100 WBCs      Neutrophils Relative 58 %      Immat GRANS % 0 %      Lymphocytes Relative 28 %      Monocytes Relative 7 %      Eosinophils Relative 6 %      Basophils Relative 1 %      Neutrophils Absolute 7 79 (H) Thousands/µL      Immature Grans Absolute 0 05 Thousand/uL      Lymphocytes Absolute 3 72 Thousands/µL      Monocytes Absolute 0 99 Thousand/µL      Eosinophils Absolute 0 82 (H) Thousand/µL      Basophils Absolute 0 08 Thousands/µL     Comprehensive metabolic panel [[id number]] Collected:  10/09/18 0603    Lab Status: In process Specimen:  Blood from Arm, Left Updated:  10/09/18 0613    Magnesium [[id number]] Collected:  10/09/18 0603    Lab Status: In process Specimen:  Blood from Arm, Left Updated:  10/09/18 0613    Phosphorus [[id number]] Collected:  10/09/18 0603    Lab Status: In process Specimen:  Blood from Arm, Left Updated:  10/09/18 0613    Blood culture #1 [[id number]] Collected:  10/07/18 1613    Lab Status:  Preliminary result Specimen:  Blood from Arm, Left Updated:  10/08/18 2002     Blood Culture No Growth at 24 hrs  Blood culture #2 [[id number]] Collected:  10/07/18 1552    Lab Status:  Preliminary result Specimen:  Blood from Hand, Left Updated:  10/08/18 2002     Blood Culture No Growth at 24 hrs      Lactic acid, plasma [[id number]]  (Abnormal) Collected:  10/08/18 1339    Lab Status:  Final result Specimen:  Blood from Arm, Right Updated:  10/08/18 1409     LACTIC ACID 2 2 (HH) mmol/L     Narrative:         Result may be elevated if tourniquet was used during collection  Lactic acid, plasma [[id number]]  (Abnormal) Collected:  10/08/18 0921    Lab Status:  Final result Specimen:  Blood from Arm, Left Updated:  10/08/18 1021     LACTIC ACID 2 4 (HH) mmol/L     Narrative:         Result may be elevated if tourniquet was used during collection  Procalcitonin [[id number]]  (Normal) Collected:  10/07/18 1552    Lab Status:  Final result Specimen:  Blood from Hand, Left Updated:  10/08/18 0839     Procalcitonin 0 12 ng/ml     Lactic acid, plasma [[id number]]  (Normal) Collected:  10/08/18 5504    Lab Status:  Final result Specimen:  Blood from Hand, Left Updated:  10/08/18 0650     LACTIC ACID 2 0 mmol/L     Narrative:         Result may be elevated if tourniquet was used during collection      APTT [[id number]]  (Abnormal) Collected:  10/08/18 0338    Lab Status:  Final result Specimen:  Blood Updated:  10/08/18 0417     PTT 37 (H) seconds     Protime-INR [[id number]]  (Abnormal) Collected:  10/08/18 0338    Lab Status:  Final result Specimen:  Blood Updated:  10/08/18 0417     Protime 16 0 (H) seconds      INR 1 35 (H)    Comprehensive metabolic panel [[id number]]  (Abnormal) Collected:  10/08/18 0338    Lab Status:  Final result Specimen:  Blood Updated:  10/08/18 0416     Sodium 140 mmol/L      Potassium 3 9 mmol/L      Chloride 105 mmol/L      CO2 30 mmol/L      ANION GAP 5 mmol/L      BUN 12 mg/dL      Creatinine 0 44 (L) mg/dL      Glucose 108 mg/dL      Calcium 8 7 mg/dL      AST 57 (H) U/L      ALT 64 U/L      Alkaline Phosphatase 110 U/L      Total Protein 5 6 (L) g/dL      Albumin 2 0 (L) g/dL      Total Bilirubin 0 60 mg/dL      eGFR 129 ml/min/1 73sq m     Narrative:         National Kidney Disease Education Program recommendations are as follows:  GFR calculation is accurate only with a steady state creatinine  Chronic Kidney disease less than 60 ml/min/1 73 sq  meters  Kidney failure less than 15 ml/min/1 73 sq  meters  Magnesium [[id number]]  (Normal) Collected:  10/08/18 0338    Lab Status:  Final result Specimen:  Blood Updated:  10/08/18 0416     Magnesium 1 8 mg/dL     Phosphorus [[id number]]  (Normal) Collected:  10/08/18 0338    Lab Status:  Final result Specimen:  Blood Updated:  10/08/18 0416     Phosphorus 4 1 mg/dL     Lactic acid, plasma [[id number]]  (Abnormal) Resulted:  10/08/18 0405    Lab Status:  Final result Specimen:  Blood Updated:  10/08/18 0405     LACTIC ACID 2 2 (HH) mmol/L     Narrative:         Result may be elevated if tourniquet was used during collection  CBC and differential [[id number]]  (Abnormal) Collected:  10/08/18 0338    Lab Status:  Final result Specimen:  Blood Updated:  10/08/18 0354     WBC 28 07 (H) Thousand/uL      RBC 3 61 (L) Million/uL      Hemoglobin 12 1 g/dL      Hematocrit 36 9 %       (H) fL      MCH 33 5 pg      MCHC 32 8 g/dL      RDW 13 4 %      MPV 11 8 fL      Platelets 680 Thousands/uL      nRBC 0 /100 WBCs      Neutrophils Relative 81 (H) %      Immat GRANS % 1 %      Lymphocytes Relative 11 (L) %      Monocytes Relative 7 %      Eosinophils Relative 0 %      Basophils Relative 0 %      Neutrophils Absolute 22 96 (H) Thousands/µL      Immature Grans Absolute 0 15 Thousand/uL      Lymphocytes Absolute 2 97 Thousands/µL      Monocytes Absolute 1 90 (H) Thousand/µL      Eosinophils Absolute 0 01 Thousand/µL      Basophils Absolute 0 08 Thousands/µL     Lactic acid, plasma [[id number]]  (Abnormal) Collected:  10/07/18 2157    Lab Status:  Final result Specimen:  Blood from Arm, Left Updated:  10/07/18 2235     LACTIC ACID 3 9 (HH) mmol/L     Narrative:         Result may be elevated if tourniquet was used during collection      Platelet count [[id number]]  (Normal) Collected:  10/07/18 2157    Lab Status:  Final result Specimen:  Blood from Arm, Left Updated:  10/07/18 2208 Platelets 930 Thousands/uL      MPV 11 8 fL     Lactic acid, plasma [[id number]]  (Abnormal) Collected:  10/07/18 1940    Lab Status:  Final result Specimen:  Blood from Hand, Right Updated:  10/07/18 2012     LACTIC ACID 3 9 (HH) mmol/L     Narrative:         Result may be elevated if tourniquet was used during collection  Lactic acid x2 [[id number]]  (Abnormal) Collected:  10/07/18 1810    Lab Status:  Final result Specimen:  Blood from Hand, Right Updated:  10/07/18 1846     LACTIC ACID 4 4 (HH) mmol/L     Narrative:         Result may be elevated if tourniquet was used during collection  Comprehensive metabolic panel [[id number]]  (Abnormal) Collected:  10/07/18 1810    Lab Status:  Final result Specimen:  Blood from Hand, Right Updated:  10/07/18 1834     Sodium 140 mmol/L      Potassium 5 2 mmol/L      Chloride 104 mmol/L      CO2 28 mmol/L      ANION GAP 8 mmol/L      BUN 14 mg/dL      Creatinine 0 54 (L) mg/dL      Glucose 116 mg/dL      Calcium 9 4 mg/dL      AST 95 (H) U/L      ALT 88 (H) U/L      Alkaline Phosphatase 137 (H) U/L      Total Protein 7 2 g/dL      Albumin 2 6 (L) g/dL      Total Bilirubin 0 50 mg/dL      eGFR 121 ml/min/1 73sq m     Narrative:         National Kidney Disease Education Program recommendations are as follows:  GFR calculation is accurate only with a steady state creatinine  Chronic Kidney disease less than 60 ml/min/1 73 sq  meters  Kidney failure less than 15 ml/min/1 73 sq  meters  Magnesium [[id number]]  (Normal) Collected:  10/07/18 1810    Lab Status:  Final result Specimen:  Blood from Hand, Right Updated:  10/07/18 1834     Magnesium 2 0 mg/dL     Lactic acid x2 [[id number]]  (Abnormal) Collected:  10/07/18 1552    Lab Status:  Final result Specimen:  Blood from Hand, Left Updated:  10/07/18 1620     LACTIC ACID 2 4 (HH) mmol/L     Narrative:         Result may be elevated if tourniquet was used during collection      Protime-INR [[id number]]  (Normal) Collected: 10/07/18 1552    Lab Status:  Final result Specimen:  Blood from Hand, Left Updated:  10/07/18 1609     Protime 13 6 seconds      INR 1 09    APTT [[id number]]  (Normal) Collected:  10/07/18 1552    Lab Status:  Final result Specimen:  Blood from Hand, Left Updated:  10/07/18 1609     PTT 32 seconds     CBC and differential [[id number]]  (Abnormal) Collected:  10/07/18 1552    Lab Status:  Final result Specimen:  Blood from Hand, Left Updated:  10/07/18 1558     WBC 6 93 Thousand/uL      RBC 4 40 Million/uL      Hemoglobin 14 7 g/dL      Hematocrit 45 0 %       (H) fL      MCH 33 4 pg      MCHC 32 7 g/dL      RDW 13 3 %      MPV 11 4 fL      Platelets 236 Thousands/uL      nRBC 0 /100 WBCs      Neutrophils Relative 68 %      Immat GRANS % 0 %      Lymphocytes Relative 22 %      Monocytes Relative 8 %      Eosinophils Relative 1 %      Basophils Relative 1 %      Neutrophils Absolute 4 68 Thousands/µL      Immature Grans Absolute 0 03 Thousand/uL      Lymphocytes Absolute 1 55 Thousands/µL      Monocytes Absolute 0 52 Thousand/µL      Eosinophils Absolute 0 10 Thousand/µL      Basophils Absolute 0 05 Thousands/µL     UA w Reflex to Microscopic w Reflex to Culture [[id number]]     Lab Status:  No result Specimen:  Urine                  XR chest portable   Final Result by Rashad Higuera DO (10/08 1035)      No acute cardiopulmonary disease  Workstation performed: [id number]         XR chest portable - 1 view   Final Result by J Tim Peabody, MD (10/08 0825)      No acute cardiopulmonary disease  Workstation performed: [id number]                    Procedures  Procedures       Phone Contacts  ED Phone Contact    ED Course  ED Course as of Oct 09 0649   Nemesio Dejesus Oct 07, 2018   1625 Treatment in progress LACTIC ACID: (!!) 2 4 1918 Pt's father called  I gave him an update            HEART Risk Score      Most Recent Value   History  0 Filed at: 10/08/2018 0057   ECG  0 Filed at: 10/08/2018 3100 Age  0 Filed at: 10/08/2018 0057   Risk Factors  0 Filed at: 10/08/2018 0057   Troponin  0 Filed at: 10/08/2018 0057   Heart Score Risk Calculator   History  0 Filed at: 10/08/2018 0057   ECG  0 Filed at: 10/08/2018 0057   Age  0 Filed at: 10/08/2018 0057   Risk Factors  0 Filed at: 10/08/2018 0057   Troponin  0 Filed at: 10/08/2018 0057   HEART Score  0 Filed at: 10/08/2018 0057   HEART Score  0 Filed at: 10/08/2018 0057                            MDM  Number of Diagnoses or Management Options     Amount and/or Complexity of Data Reviewed  Tests in the radiology section of CPT®: ordered and reviewed  Tests in the medicine section of CPT®: ordered and reviewed  Decide to obtain previous medical records or to obtain history from someone other than the patient: yes  Independent visualization of images, tracings, or specimens: yes    Risk of Complications, Morbidity, and/or Mortality  Presenting problems: high    Patient Progress  Patient progress: improved    CritCare Time    Disposition  Final diagnoses:   None     ED Disposition     None      Follow-up Information    None         Current Discharge Medication List      CONTINUE these medications which have NOT CHANGED    Details   lactulose 20 g/30 mL 45 mL (30 g total) by Per G Tube route 3 (three) times a day  Refills: 0    Associated Diagnoses: Acute encephalopathy; Other generalized epilepsy, intractable, without status epilepticus ([facility] [address] )      levETIRAcetam (KEPPRA) 100 mg/mL oral solution 10 mL (1,000 mg total) by Per G Tube route every 12 (twelve) hours for 30 days  Qty: 600 mL, Refills: 5    Associated Diagnoses: 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with tonic seizures (HCC)      levOCARNitine (CARNITOR) 1 g/10 mL solution 7 5 mL (750 mg total) by Per G Tube route 3 (three) times a day  Qty: 474 mL, Refills: 7    Associated Diagnoses: 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with tonic seizures (HCC)      MELATONIN PO 6 mg by Per G Tube route daily at bedtime      Multiple Vitamins-Minerals (CENTRUM SILVER PO) 5 mL/mL by Per G Tube route daily        perampanel (FYCOMPA) oral suspension 16 mL (8 mg total) by Per NG Tube route daily at bedtime Per G tube  Qty: 480 mL, Refills: 5    Associated Diagnoses: 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with tonic seizures (HCC)      PHENobarbital 20 mg/5 mL elixir Take 15 mL (60 mg total) by mouth daily at bedtime  Qty: 473 mL, Refills: 5    Associated Diagnoses: 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with tonic seizures (HCC)      potassium chloride 10 % 20 mEq by Per G Tube route daily        Probiotic Product (ALEXANDER-BID PROBIOTIC PO) 1 tablet by Per G Tube route daily        rufinamide (BANZEL) 400 mg tablet 3 tablets (1,200 mg total) by Per G Tube route 2 (two) times a day  Qty: 180 tablet, Refills: 5    Associated Diagnoses: 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with tonic seizures (HCC)      Sennosides-Docusate Sodium (SENEXON-S PO) 1 tablet by Per G Tube route daily        topiramate (TOPAMAX) 200 MG tablet Take 1 tablet (200 mg total) by mouth every 12 (twelve) hours  Qty: 60 tablet, Refills: 5    Associated Diagnoses: 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with tonic seizures (HCC)      valproic acid (DEPAKENE) 250 MG/5ML soln 5 mL (250 mg total) by Per G Tube route every 8 (eight) hours  Qty: 450 mL, Refills: 5    Associated Diagnoses: 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with tonic seizures (HCC)      acetaminophen (TYLENOL) 325 mg tablet 650 mg by Per G Tube route every 6 (six) hours as needed for mild pain      bisacodyl (FLEET) 10 MG/30ML ENEM Insert 10 mg into the rectum as needed for constipation      magnesium hydroxide (MILK OF MAGNESIA) 400 mg/5 mL oral suspension Take 30 mL by mouth daily as needed for constipation      ondansetron (ZOFRAN) 4 mg tablet 4 mg by Per G Tube route every 8 (eight) hours as needed for nausea or vomiting             No discharge procedures on file      ED Provider  Electronically Signed by           Hallie Pineda MD  10/09/18 2948

## 2018-10-09 NOTE — ASSESSMENT & PLAN NOTE
· Initiate normal saline IV fluids at 50 mL/hr  · Follow lactic acid level until the lactic acidosis resolves

## 2018-10-09 NOTE — PROGRESS NOTES
Progress Note - Jeffery Powell 1980, 41 y o  female MRN: [id number]    Unit/Bed#: 403-01 Encounter: [id number]    Primary Care Provider: Eva Iglesias DO   Date and time admitted to hospital: 10/7/2018  3:13 PM        * Sepsis [facility])   Assessment & Plan    · Probable evolving sepsis secondary to probable aspiration pneumonia  · Follow culture results  · Follow the procalcitonin level  · Continue IV vancomycin and IV cefepime for now  · Follow lactic acid level until the lactic acidosis resolves  · Initiate normal saline IV fluids at 50 mL/hr     Aspiration pneumonia (HCC)   Assessment & Plan    · Continue IV vancomycin IV cefepime  · Follow the procalcitonin level  · Follow lactic acid level until the lactic acidosis resolves  · Check Influenza/RSV testing  · Respiratory protocol  · Continue NPO status with tube feedings         Transaminitis   Assessment & Plan    · Possibly medication-related versus secondary to sepsis  · Avoid all hepatotoxic agents  · Check an acute hepatitis panel     Lactic acidosis   Assessment & Plan    · Initiate normal saline IV fluids at 50 mL/hr  · Follow lactic acid level until the lactic acidosis resolves     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with tonic seizures (HCC)   Assessment & Plan    · Seizure precautions  · Continue to monitor for any seizure activity  · Continue her current seizure medication regimen         VTE Pharmacologic Prophylaxis:   Pharmacologic: Heparin  Mechanical VTE Prophylaxis in Place: Yes    Patient Centered Rounds: I have performed bedside rounds with nursing staff today  Time Spent for Care: 20 minutes  More than 50% of total time spent on counseling and coordination of care as described above  Current Length of Stay: 2 day(s)    Current Patient Status: Inpatient   Certification Statement: The patient will continue to require additional inpatient hospital stay due to the need for IV antibiotics and continuous IV fluids        Code Status: Level 1 - Full Code      Subjective: The patient was seen and examined  The patient was more awake and alert this morning  She is non-verbal and cannot express any complaints to me  Objective:     Vitals:   Temp (24hrs), Av °F (36 7 °C), Min:97 6 °F (36 4 °C), Max:98 6 °F (37 °C)    Temp:  [97 6 °F (36 4 °C)-98 6 °F (37 °C)] 97 6 °F (36 4 °C)  HR:  [80-89] 82  Resp:  [18] 18  BP: (92-98)/(61-67) 98/62  SpO2:  [95 %-96 %] 96 %  Body mass index is 19 42 kg/m²  Input and Output Summary (last 24 hours): Intake/Output Summary (Last 24 hours) at 10/09/18 1316  Last data filed at 10/09/18 0539   Gross per 24 hour   Intake              431 ml   Output                0 ml   Net              431 ml       Physical Exam:     Physical Exam  General:  NAD, lethargic   HEENT:  NC/AT, mucous membranes dry  Neck:  Supple, No JVP elevation  CV:  + S1, + S2, RRR  Pulm:  Bibasilar crackles  Abd:  Soft, Non-tender, Non-distended, PEG tube in place  Ext:  No clubbing/cyanosis/edema  Skin:  No rashes      Additional Data:     Labs:      Results from last 7 days  Lab Units 10/09/18  0603   WBC Thousand/uL 13 45*   HEMOGLOBIN g/dL 12 1   HEMATOCRIT % 36 4   PLATELETS Thousands/uL 186   NEUTROS PCT % 58   LYMPHS PCT % 28   MONOS PCT % 7   EOS PCT % 6       Results from last 7 days  Lab Units 10/09/18  0603   SODIUM mmol/L 137   POTASSIUM mmol/L 3 9   CHLORIDE mmol/L 105   CO2 mmol/L 27   BUN mg/dL 9   CREATININE mg/dL 0 47*   ANION GAP mmol/L 5   CALCIUM mg/dL 8 4   ALBUMIN g/dL 2 1*   TOTAL BILIRUBIN mg/dL 0 40   ALK PHOS U/L 119*   ALT U/L 66   AST U/L 70*       Results from last 7 days  Lab Units 10/08/18  0338   INR  1 35*               Results from last 7 days  Lab Units 10/09/18  0603 10/08/18  1339 10/08/18  0921 10/08/18  0614 10/08/18  0405  10/07/18  1552   LACTIC ACID mmol/L  --  2 2* 2 4* 2 0 2 2*  < > 2 4*   PROCALCITONIN ng/ml 1 63*  --   --   --   --   --  0 12   < > = values in this interval not displayed          * I Have Reviewed All Lab Data Listed Above  * Additional Pertinent Lab Tests Reviewed: Maddie 66 Admission Reviewed        Recent Cultures (last 7 days):       Results from last 7 days  Lab Units 10/07/18  1613 10/07/18  1552   BLOOD CULTURE  No Growth at 24 hrs  No Growth at 24 hrs  Last 24 Hours Medication List:     Current Facility-Administered Medications:  acetaminophen 650 mg Per PEG Tube Q6H PRN [facility], DO    cefepime 2,000 mg Intravenous Q12H Karelyri Jaden, PA-C Last Rate: 2,000 mg (10/09/18 0533)   heparin (porcine) 5,000 Units Subcutaneous [facility] Valri Jaden, PA-C    lactobacillus acidophilus-bulgaricus 1 packet Oral TID Valri Shorts, PA-C    lactulose 30 g Per G Tube TID Valri Katys, PA-C    levETIRAcetam 1,000 mg Per G Tube Q12H [address] Rey Guerrero, PA-C    levOCARNitine 750 mg Per G Tube TID Valri Jaden, PA-C    magnesium sulfate 2 g Intravenous Once [facility], DO    Perampanel 8 mg Per G Tube HS Valri Shorts, PA-C    PHENobarbital 60 mg Oral HS Valri Shorts, PA-C    rufinamide 1,200 mg Per G Tube BID With Meals Valri Jaden, PA-C    sodium chloride 50 mL/hr Intravenous Continuous [facility], DO    topiramate 200 mg Oral Q12H [address] Valri Katys, PA-C    valproic acid 250 mg Per G Tube Q8H Valri Katys, PA-C    vancomycin 15 mg/kg Intravenous Q8H Valri Katys, PA-C Last Rate: 750 mg (10/09/18 5591)        Today, Patient Was Seen By: [facility], DO    ** Please Note: Dictation voice to text software may have been used in the creation of this document   **

## 2018-10-10 PROBLEM — Z22.322 MRSA COLONIZATION: Status: ACTIVE | Noted: 2018-10-10

## 2018-10-10 PROBLEM — E88.09 HYPOALBUMINEMIA: Status: ACTIVE | Noted: 2018-10-10

## 2018-10-10 PROBLEM — R71.8 ELEVATED MCV: Status: ACTIVE | Noted: 2018-10-10

## 2018-10-10 LAB
ALBUMIN SERPL BCP-MCNC: 2 G/DL (ref 3.5–5)
ALP SERPL-CCNC: 117 U/L (ref 46–116)
ALT SERPL W P-5'-P-CCNC: 67 U/L (ref 12–78)
ANION GAP SERPL CALCULATED.3IONS-SCNC: 7 MMOL/L (ref 4–13)
AST SERPL W P-5'-P-CCNC: 70 U/L (ref 5–45)
ATRIAL RATE: 94 BPM
BACTERIA UR QL AUTO: NORMAL /HPF
BASOPHILS # BLD AUTO: 0.05 THOUSANDS/ΜL (ref 0–0.1)
BASOPHILS NFR BLD AUTO: 1 % (ref 0–1)
BILIRUB SERPL-MCNC: 0.4 MG/DL (ref 0.2–1)
BILIRUB UR QL STRIP: NEGATIVE
BUN SERPL-MCNC: 9 MG/DL (ref 5–25)
CALCIUM SERPL-MCNC: 8.6 MG/DL (ref 8.3–10.1)
CHLORIDE SERPL-SCNC: 105 MMOL/L (ref 100–108)
CLARITY UR: NORMAL
CO2 SERPL-SCNC: 27 MMOL/L (ref 21–32)
COLOR UR: YELLOW
CREAT SERPL-MCNC: 0.42 MG/DL (ref 0.6–1.3)
EOSINOPHIL # BLD AUTO: 0.64 THOUSAND/ΜL (ref 0–0.61)
EOSINOPHIL NFR BLD AUTO: 8 % (ref 0–6)
ERYTHROCYTE [DISTWIDTH] IN BLOOD BY AUTOMATED COUNT: 13.2 % (ref 11.6–15.1)
FLUAV AG SPEC QL: NORMAL
FLUBV AG SPEC QL: NORMAL
GFR SERPL CREATININE-BSD FRML MDRD: 131 ML/MIN/1.73SQ M
GLUCOSE SERPL-MCNC: 80 MG/DL (ref 65–140)
GLUCOSE UR STRIP-MCNC: NEGATIVE MG/DL
HAV IGM SER QL: NORMAL
HBV CORE IGM SER QL: NORMAL
HBV SURFACE AG SER QL: NORMAL
HCT VFR BLD AUTO: 36.6 % (ref 34.8–46.1)
HCV AB SER QL: NORMAL
HGB BLD-MCNC: 12.2 G/DL (ref 11.5–15.4)
HGB UR QL STRIP.AUTO: NEGATIVE
IMM GRANULOCYTES # BLD AUTO: 0.03 THOUSAND/UL (ref 0–0.2)
IMM GRANULOCYTES NFR BLD AUTO: 0 % (ref 0–2)
KETONES UR STRIP-MCNC: NEGATIVE MG/DL
LACTATE SERPL-SCNC: 1.9 MMOL/L (ref 0.5–2)
LEUKOCYTE ESTERASE UR QL STRIP: NEGATIVE
LYMPHOCYTES # BLD AUTO: 3.17 THOUSANDS/ΜL (ref 0.6–4.47)
LYMPHOCYTES NFR BLD AUTO: 38 % (ref 14–44)
MAGNESIUM SERPL-MCNC: 1.7 MG/DL (ref 1.6–2.6)
MCH RBC QN AUTO: 33.9 PG (ref 26.8–34.3)
MCHC RBC AUTO-ENTMCNC: 33.3 G/DL (ref 31.4–37.4)
MCV RBC AUTO: 102 FL (ref 82–98)
MONOCYTES # BLD AUTO: 0.72 THOUSAND/ΜL (ref 0.17–1.22)
MONOCYTES NFR BLD AUTO: 9 % (ref 4–12)
NEUTROPHILS # BLD AUTO: 3.75 THOUSANDS/ΜL (ref 1.85–7.62)
NEUTS SEG NFR BLD AUTO: 44 % (ref 43–75)
NITRITE UR QL STRIP: NEGATIVE
NON-SQ EPI CELLS URNS QL MICRO: NORMAL /HPF
NRBC BLD AUTO-RTO: 0 /100 WBCS
P AXIS: 67 DEGREES
PH UR STRIP.AUTO: 7 [PH] (ref 4.5–8)
PHOSPHATE SERPL-MCNC: 4.3 MG/DL (ref 2.7–4.5)
PLATELET # BLD AUTO: 193 THOUSANDS/UL (ref 149–390)
PMV BLD AUTO: 11.6 FL (ref 8.9–12.7)
POTASSIUM SERPL-SCNC: 4 MMOL/L (ref 3.5–5.3)
PR INTERVAL: 126 MS
PROCALCITONIN SERPL-MCNC: 1.11 NG/ML
PROLACTIN SERPL-MCNC: 11.5 NG/ML
PROT SERPL-MCNC: 5.7 G/DL (ref 6.4–8.2)
PROT UR STRIP-MCNC: NEGATIVE MG/DL
QRS AXIS: 78 DEGREES
QRSD INTERVAL: 66 MS
QT INTERVAL: 322 MS
QTC INTERVAL: 402 MS
RBC # BLD AUTO: 3.6 MILLION/UL (ref 3.81–5.12)
RBC #/AREA URNS AUTO: NORMAL /HPF
RSV B RNA SPEC QL NAA+PROBE: NORMAL
SODIUM SERPL-SCNC: 139 MMOL/L (ref 136–145)
SP GR UR STRIP.AUTO: 1.02 (ref 1–1.03)
T WAVE AXIS: 65 DEGREES
UROBILINOGEN UR QL STRIP.AUTO: 0.2 E.U./DL
VENTRICULAR RATE: 94 BPM
WBC # BLD AUTO: 8.36 THOUSAND/UL (ref 4.31–10.16)
WBC #/AREA URNS AUTO: NORMAL /HPF

## 2018-10-10 PROCEDURE — 83605 ASSAY OF LACTIC ACID: CPT | Performed by: INTERNAL MEDICINE

## 2018-10-10 PROCEDURE — 81001 URINALYSIS AUTO W/SCOPE: CPT | Performed by: INTERNAL MEDICINE

## 2018-10-10 PROCEDURE — 84146 ASSAY OF PROLACTIN: CPT | Performed by: INTERNAL MEDICINE

## 2018-10-10 PROCEDURE — 85025 COMPLETE CBC W/AUTO DIFF WBC: CPT | Performed by: PHYSICIAN ASSISTANT

## 2018-10-10 PROCEDURE — 99232 SBSQ HOSP IP/OBS MODERATE 35: CPT | Performed by: INTERNAL MEDICINE

## 2018-10-10 PROCEDURE — 93010 ELECTROCARDIOGRAM REPORT: CPT | Performed by: INTERNAL MEDICINE

## 2018-10-10 PROCEDURE — 84100 ASSAY OF PHOSPHORUS: CPT | Performed by: PHYSICIAN ASSISTANT

## 2018-10-10 PROCEDURE — 80053 COMPREHEN METABOLIC PANEL: CPT | Performed by: PHYSICIAN ASSISTANT

## 2018-10-10 PROCEDURE — 83735 ASSAY OF MAGNESIUM: CPT | Performed by: PHYSICIAN ASSISTANT

## 2018-10-10 PROCEDURE — 84145 PROCALCITONIN (PCT): CPT | Performed by: INTERNAL MEDICINE

## 2018-10-10 PROCEDURE — 80074 ACUTE HEPATITIS PANEL: CPT | Performed by: INTERNAL MEDICINE

## 2018-10-10 PROCEDURE — 87086 URINE CULTURE/COLONY COUNT: CPT | Performed by: INTERNAL MEDICINE

## 2018-10-10 RX ORDER — MAGNESIUM SULFATE HEPTAHYDRATE 40 MG/ML
2 INJECTION, SOLUTION INTRAVENOUS ONCE
Status: COMPLETED | OUTPATIENT
Start: 2018-10-10 | End: 2018-10-10

## 2018-10-10 RX ORDER — PHENOBARBITAL SODIUM 130 MG/ML
60 INJECTION INTRAMUSCULAR ONCE
Status: COMPLETED | OUTPATIENT
Start: 2018-10-10 | End: 2018-10-10

## 2018-10-10 RX ADMIN — VANCOMYCIN HYDROCHLORIDE 750 MG: 750 INJECTION, POWDER, LYOPHILIZED, FOR SOLUTION INTRAVENOUS at 22:11

## 2018-10-10 RX ADMIN — Medication 2000 MG: at 05:35

## 2018-10-10 RX ADMIN — LEVETIRACETAM 1000 MG: 100 SOLUTION ORAL at 10:53

## 2018-10-10 RX ADMIN — RUFINAMIDE 1200 MG: 200 TABLET, FILM COATED ORAL at 18:12

## 2018-10-10 RX ADMIN — PHENOBARBITAL 60 MG: 20 LIQUID ORAL at 21:54

## 2018-10-10 RX ADMIN — PERAMPANEL 8 MG: 4 TABLET ORAL at 21:57

## 2018-10-10 RX ADMIN — LEVETIRACETAM 1000 MG: 100 SOLUTION ORAL at 21:57

## 2018-10-10 RX ADMIN — HEPARIN SODIUM 5000 UNITS: 5000 INJECTION, SOLUTION INTRAVENOUS; SUBCUTANEOUS at 15:07

## 2018-10-10 RX ADMIN — HEPARIN SODIUM 5000 UNITS: 5000 INJECTION, SOLUTION INTRAVENOUS; SUBCUTANEOUS at 22:07

## 2018-10-10 RX ADMIN — Medication 2000 MG: at 18:10

## 2018-10-10 RX ADMIN — TOPIRAMATE 200 MG: 100 TABLET, FILM COATED ORAL at 10:49

## 2018-10-10 RX ADMIN — VANCOMYCIN HYDROCHLORIDE 750 MG: 750 INJECTION, POWDER, LYOPHILIZED, FOR SOLUTION INTRAVENOUS at 06:13

## 2018-10-10 RX ADMIN — HEPARIN SODIUM 5000 UNITS: 5000 INJECTION, SOLUTION INTRAVENOUS; SUBCUTANEOUS at 05:43

## 2018-10-10 RX ADMIN — VANCOMYCIN HYDROCHLORIDE 750 MG: 750 INJECTION, POWDER, LYOPHILIZED, FOR SOLUTION INTRAVENOUS at 15:07

## 2018-10-10 RX ADMIN — LACTULOSE 30 G: 10 SOLUTION ORAL at 15:26

## 2018-10-10 RX ADMIN — Medication 250 MG: at 02:35

## 2018-10-10 RX ADMIN — VALPROIC ACID 250 MG: 500 SOLUTION ORAL at 18:12

## 2018-10-10 RX ADMIN — MUPIROCIN 1 APPLICATION: 20 OINTMENT TOPICAL at 10:54

## 2018-10-10 RX ADMIN — RUFINAMIDE 1200 MG: 200 TABLET, FILM COATED ORAL at 10:52

## 2018-10-10 RX ADMIN — Medication 1000 MG: at 03:40

## 2018-10-10 RX ADMIN — LEVOCARNITINE 750 MG: 1 SOLUTION ORAL at 21:57

## 2018-10-10 RX ADMIN — PHENOBARBITAL SODIUM 60 MG: 130 INJECTION INTRAMUSCULAR; INTRAVENOUS at 02:29

## 2018-10-10 RX ADMIN — LACTULOSE 30 G: 10 SOLUTION ORAL at 10:50

## 2018-10-10 RX ADMIN — LEVOCARNITINE 750 MG: 1 SOLUTION ORAL at 15:26

## 2018-10-10 RX ADMIN — LACTOBACILLUS ACIDOPHILUS / LACTOBACILLUS BULGARICUS 1 PACKET: 100 MILLION CFU STRENGTH GRANULES at 10:49

## 2018-10-10 RX ADMIN — MUPIROCIN 1 APPLICATION: 20 OINTMENT TOPICAL at 22:06

## 2018-10-10 RX ADMIN — TOPIRAMATE 200 MG: 100 TABLET, FILM COATED ORAL at 21:51

## 2018-10-10 RX ADMIN — LEVOCARNITINE 750 MG: 1 SOLUTION ORAL at 10:54

## 2018-10-10 RX ADMIN — SODIUM CHLORIDE 50 ML/HR: 0.9 INJECTION, SOLUTION INTRAVENOUS at 06:13

## 2018-10-10 RX ADMIN — LACTOBACILLUS ACIDOPHILUS / LACTOBACILLUS BULGARICUS 1 PACKET: 100 MILLION CFU STRENGTH GRANULES at 22:06

## 2018-10-10 RX ADMIN — MAGNESIUM SULFATE HEPTAHYDRATE 2 G: 40 INJECTION, SOLUTION INTRAVENOUS at 13:51

## 2018-10-10 RX ADMIN — LACTOBACILLUS ACIDOPHILUS / LACTOBACILLUS BULGARICUS 1 PACKET: 100 MILLION CFU STRENGTH GRANULES at 15:26

## 2018-10-10 RX ADMIN — LACTULOSE 30 G: 10 SOLUTION ORAL at 21:53

## 2018-10-10 RX ADMIN — VALPROIC ACID 250 MG: 500 SOLUTION ORAL at 10:51

## 2018-10-10 NOTE — ASSESSMENT & PLAN NOTE
· The patient will be maintained on contact precautions  She will be placed on nasal bactroban/mupirocin 2% ointment applied to both nares BID for 5 days  The patient will also need MRSA decolonization at [facility] including chlorhexidine/hibiclens rinse applied from the neck down to the toes daily with bathing/showering for 2 weeks  She will need a MRSA nasal screen rechecked in 2 weeks after decolonization

## 2018-10-10 NOTE — CONSULTS
Progress Note - Wound   Paz Lav 41 y o  female MRN: [id number]  Unit/Bed#: 403-01 Encounter: [id number]      Assessment:   Skin intact  Slight pink discoloration at PEG tub site  Tyson 12-high risk for pressure injury    Plan:   1  Cavilon No Sting skin prep with PEG site care  2   Avoid brief  3   Accumax mattress with air pump  4  Wound consult PRN    Vitals: Blood pressure (!) 87/66, pulse 69, temperature 97 8 °F (36 6 °C), temperature source Temporal, resp  rate 18, height 5' (1 524 m), weight 45 3 kg (99 lb 13 9 oz), SpO2 95 %  ,Body mass index is 19 5 kg/m²  Milind Verduzco RN   CWOCN  10/10/2018 14:25

## 2018-10-10 NOTE — PROGRESS NOTES
Progress Note - Ricky Age 1980, 41 y o  female MRN: [id number]    Unit/Bed#: 403-01 Encounter: [id number]    Primary Care Provider: Rock Edwards DO   Date and time admitted to hospital: 10/7/2018  3:13 PM        * Sepsis [facility])   Assessment & Plan    · Probable evolving sepsis secondary to probable aspiration pneumonia  · Follow culture results  · Follow the procalcitonin level  · Continue IV vancomycin and IV cefepime until the procalcitonin level normalizes  · The lactic acidosis resolved  · Continue normal saline IV fluids at 50 mL/hr     Aspiration pneumonia (HCC)   Assessment & Plan    · Probable evolving sepsis secondary to probable aspiration pneumonia  · Follow culture results  · Follow the procalcitonin level  · Continue IV vancomycin and IV cefepime until the procalcitonin level normalizes  · The lactic acidosis resolved  · Continue normal saline IV fluids at 50 mL/hr         Hypoalbuminemia   Assessment & Plan    · Nutrition/dietitian consultation     Elevated MCV   Assessment & Plan    · Check a vitamin B12 level and folate level     MRSA colonization   Assessment & Plan    · The patient will be maintained on contact precautions  She will be placed on nasal bactroban/mupirocin 2% ointment applied to both nares BID for 5 days  The patient will also need MRSA decolonization at [facility] including chlorhexidine/hibiclens rinse applied from the neck down to the toes daily with bathing/showering for 2 weeks  She will need a MRSA nasal screen rechecked in 2 weeks after decolonization         Skin breakdown   Assessment & Plan    · Wound care consult     Transaminitis   Assessment & Plan    · Possibly medication-related versus secondary to sepsis  · Avoid all hepatotoxic agents  · Check an acute hepatitis panel  · Follow the liver function tests     Lactic acidosis   Assessment & Plan    · Resolved     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with tonic seizures (HCC)   Assessment & Plan    · Seizure precautions  · Continue to monitor for any seizure activity  · Continue her current seizure medication regimen         VTE Pharmacologic Prophylaxis:   Pharmacologic: Heparin  Mechanical VTE Prophylaxis in Place: Yes    Time Spent for Care: 20 minutes  More than 50% of total time spent on counseling and coordination of care as described above  Current Length of Stay: 3 day(s)    Current Patient Status: Inpatient   Certification Statement: The patient will continue to require additional inpatient hospital stay due to the need for IV antibiotics  Code Status: Level 1 - Full Code      Subjective: The patient was seen and examined  The patient is non-verbal and cannot express any complaints  Objective:     Vitals:   Temp (24hrs), Av 5 °F (36 4 °C), Min:96 °F (35 6 °C), Max:98 7 °F (37 1 °C)    Temp:  [96 °F (35 6 °C)-98 7 °F (37 1 °C)] 97 8 °F (36 6 °C)  HR:  [69-83] 69  Resp:  [16-18] 18  BP: (87-99)/(66-74) 87/66  SpO2:  [94 %-96 %] 95 %  Body mass index is 19 5 kg/m²  Input and Output Summary (last 24 hours):        Intake/Output Summary (Last 24 hours) at 10/10/18 1348  Last data filed at 10/10/18 0410   Gross per 24 hour   Intake               50 ml   Output                0 ml   Net               50 ml       Physical Exam:     Physical Exam  General:  NAD, lethargic, responds to verbal and tactile stimuli  HEENT:  NC/AT, mucous membranes dry  Neck:  Supple, No JVP elevation  CV:  + S1, + S2, RRR  Pulm:  Bibasilar crackles  Abd:  Soft, Non-tender, Non-distended  Ext:  No clubbing/cyanosis/edema  Skin:  No rashes      Additional Data:     Labs:      Results from last 7 days  Lab Units 10/10/18  0559   WBC Thousand/uL 8 36   HEMOGLOBIN g/dL 12 2   HEMATOCRIT % 36 6   PLATELETS Thousands/uL 193   NEUTROS PCT % 44   LYMPHS PCT % 38   MONOS PCT % 9   EOS PCT % 8*       Results from last 7 days  Lab Units 10/10/18  0559   SODIUM mmol/L 139   POTASSIUM mmol/L 4 0   CHLORIDE mmol/L 105   CO2 mmol/L 27 BUN mg/dL 9   CREATININE mg/dL 0 42*   ANION GAP mmol/L 7   CALCIUM mg/dL 8 6   ALBUMIN g/dL 2 0*   TOTAL BILIRUBIN mg/dL 0 40   ALK PHOS U/L 117*   ALT U/L 67   AST U/L 70*       Results from last 7 days  Lab Units 10/08/18  0338   INR  1 35*               Results from last 7 days  Lab Units 10/10/18  0559 10/09/18  0603 10/08/18  1339 10/08/18  0921 10/08/18  0614  10/07/18  1552   LACTIC ACID mmol/L 1 9  --  2 2* 2 4* 2 0  < > 2 4*   PROCALCITONIN ng/ml 1 11* 1 63*  --   --   --   --  0 12   < > = values in this interval not displayed  * I Have Reviewed All Lab Data Listed Above  * Additional Pertinent Lab Tests Reviewed: Maddie Escudero Admission Reviewed      Recent Cultures (last 7 days):       Results from last 7 days  Lab Units 10/09/18  1336 10/07/18  1613 10/07/18  1552   BLOOD CULTURE   --  No Growth at 48 hrs  No Growth at 48 hrs     INFLUENZA A PCR  None Detected  --   --    INFLUENZA B PCR  None Detected  --   --    RSV PCR  None Detected  --   --        Last 24 Hours Medication List:     Current Facility-Administered Medications:  acetaminophen 650 mg Per PEG Tube Q6H PRN Anna Emperor, DO    cefepime 2,000 mg Intravenous Q12H Londell Pellet, PA-C Last Rate: 2,000 mg (10/10/18 0535)   heparin (porcine) 5,000 Units Subcutaneous [facility] Londell Pellet, PA-C    lactobacillus acidophilus-bulgaricus 1 packet Oral TID Londell Pellet, PA-C    lactulose 30 g Per G Tube TID Londell Pellet, PA-C    levETIRAcetam 1,000 mg Per G Tube Q12H [facility] Rey Guerrero PA-C    levOCARNitine 750 mg Per G Tube TID Londell Pellet, PA-C    magnesium sulfate 2 g Intravenous Once Anna Emperor, DO    mupirocin  Nasal Q12H [address] Mariaa Rothbury, DO    Perampanel 8 mg Per G Tube HS Londell Pellet, PA-C    PHENobarbital 60 mg Oral HS Londell Pellet, PA-C    rufinamide 1,200 mg Per G Tube BID With Meals Soniaell Pellet, PA-C    sodium chloride 50 mL/hr Intravenous Continuous DO Dashawn Canales Rate: 50 mL/hr (10/10/18 9484)   topiramate 200 mg Oral Q12H [facility] Yun Amaya PA-C    valproic acid 250 mg Per G Tube Q8H Yun Amaya PA-C    vancomycin 15 mg/kg Intravenous Ignacio Guerrero PA-C Last Rate: 750 mg (10/10/18 1337)        Today, Patient Was Seen By: Isela Shaw DO    ** Please Note: Dictation voice to text software may have been used in the creation of this document   **

## 2018-10-10 NOTE — ASSESSMENT & PLAN NOTE
· Possibly medication-related versus secondary to sepsis  · Avoid all hepatotoxic agents  · Check an acute hepatitis panel  · Follow the liver function tests

## 2018-10-10 NOTE — ASSESSMENT & PLAN NOTE
· Probable evolving sepsis secondary to probable aspiration pneumonia  · Follow culture results  · Follow the procalcitonin level  · Continue IV vancomycin and IV cefepime until the procalcitonin level normalizes  · The lactic acidosis resolved  · Continue normal saline IV fluids at 50 mL/hr

## 2018-10-10 NOTE — PROGRESS NOTES
Patient's PEG tube blocked  Liquid meds were administered slowly, but crushed pills mixed with water unable to be administered d/t blockage  Angel velarde

## 2018-10-11 LAB
ALBUMIN SERPL BCP-MCNC: 2.1 G/DL (ref 3.5–5)
ALP SERPL-CCNC: 128 U/L (ref 46–116)
ALT SERPL W P-5'-P-CCNC: 72 U/L (ref 12–78)
ANION GAP SERPL CALCULATED.3IONS-SCNC: 8 MMOL/L (ref 4–13)
AST SERPL W P-5'-P-CCNC: 80 U/L (ref 5–45)
BASOPHILS # BLD AUTO: 0.06 THOUSANDS/ΜL (ref 0–0.1)
BASOPHILS NFR BLD AUTO: 1 % (ref 0–1)
BILIRUB SERPL-MCNC: 0.4 MG/DL (ref 0.2–1)
BUN SERPL-MCNC: 8 MG/DL (ref 5–25)
CALCIUM SERPL-MCNC: 8.2 MG/DL (ref 8.3–10.1)
CHLORIDE SERPL-SCNC: 104 MMOL/L (ref 100–108)
CO2 SERPL-SCNC: 25 MMOL/L (ref 21–32)
CREAT SERPL-MCNC: 0.42 MG/DL (ref 0.6–1.3)
EOSINOPHIL # BLD AUTO: 0.34 THOUSAND/ΜL (ref 0–0.61)
EOSINOPHIL NFR BLD AUTO: 4 % (ref 0–6)
ERYTHROCYTE [DISTWIDTH] IN BLOOD BY AUTOMATED COUNT: 13.2 % (ref 11.6–15.1)
FOLATE SERPL-MCNC: >20 NG/ML (ref 3.1–17.5)
GFR SERPL CREATININE-BSD FRML MDRD: 131 ML/MIN/1.73SQ M
GLUCOSE SERPL-MCNC: 92 MG/DL (ref 65–140)
HCT VFR BLD AUTO: 36.4 % (ref 34.8–46.1)
HGB BLD-MCNC: 12.1 G/DL (ref 11.5–15.4)
IMM GRANULOCYTES # BLD AUTO: 0.05 THOUSAND/UL (ref 0–0.2)
IMM GRANULOCYTES NFR BLD AUTO: 1 % (ref 0–2)
LYMPHOCYTES # BLD AUTO: 3.2 THOUSANDS/ΜL (ref 0.6–4.47)
LYMPHOCYTES NFR BLD AUTO: 37 % (ref 14–44)
MAGNESIUM SERPL-MCNC: 2 MG/DL (ref 1.6–2.6)
MCH RBC QN AUTO: 33.5 PG (ref 26.8–34.3)
MCHC RBC AUTO-ENTMCNC: 33.2 G/DL (ref 31.4–37.4)
MCV RBC AUTO: 101 FL (ref 82–98)
MONOCYTES # BLD AUTO: 0.83 THOUSAND/ΜL (ref 0.17–1.22)
MONOCYTES NFR BLD AUTO: 10 % (ref 4–12)
MRSA NOSE QL CULT: ABNORMAL
MRSA NOSE QL CULT: ABNORMAL
NEUTROPHILS # BLD AUTO: 4.23 THOUSANDS/ΜL (ref 1.85–7.62)
NEUTS SEG NFR BLD AUTO: 47 % (ref 43–75)
NRBC BLD AUTO-RTO: 0 /100 WBCS
PLATELET # BLD AUTO: 182 THOUSANDS/UL (ref 149–390)
PMV BLD AUTO: 12.2 FL (ref 8.9–12.7)
POTASSIUM SERPL-SCNC: 3.6 MMOL/L (ref 3.5–5.3)
PROCALCITONIN SERPL-MCNC: 0.79 NG/ML
PROT SERPL-MCNC: 5.9 G/DL (ref 6.4–8.2)
RBC # BLD AUTO: 3.61 MILLION/UL (ref 3.81–5.12)
SODIUM SERPL-SCNC: 137 MMOL/L (ref 136–145)
VIT B12 SERPL-MCNC: 1944 PG/ML (ref 100–900)
WBC # BLD AUTO: 8.71 THOUSAND/UL (ref 4.31–10.16)

## 2018-10-11 PROCEDURE — 82607 VITAMIN B-12: CPT | Performed by: INTERNAL MEDICINE

## 2018-10-11 PROCEDURE — 85025 COMPLETE CBC W/AUTO DIFF WBC: CPT | Performed by: INTERNAL MEDICINE

## 2018-10-11 PROCEDURE — 82746 ASSAY OF FOLIC ACID SERUM: CPT | Performed by: INTERNAL MEDICINE

## 2018-10-11 PROCEDURE — 83735 ASSAY OF MAGNESIUM: CPT | Performed by: INTERNAL MEDICINE

## 2018-10-11 PROCEDURE — 84145 PROCALCITONIN (PCT): CPT | Performed by: INTERNAL MEDICINE

## 2018-10-11 PROCEDURE — 80053 COMPREHEN METABOLIC PANEL: CPT | Performed by: INTERNAL MEDICINE

## 2018-10-11 PROCEDURE — 99232 SBSQ HOSP IP/OBS MODERATE 35: CPT | Performed by: INTERNAL MEDICINE

## 2018-10-11 RX ADMIN — HEPARIN SODIUM 5000 UNITS: 5000 INJECTION, SOLUTION INTRAVENOUS; SUBCUTANEOUS at 13:35

## 2018-10-11 RX ADMIN — VANCOMYCIN HYDROCHLORIDE 750 MG: 750 INJECTION, POWDER, LYOPHILIZED, FOR SOLUTION INTRAVENOUS at 13:37

## 2018-10-11 RX ADMIN — PHENOBARBITAL 60 MG: 20 LIQUID ORAL at 21:13

## 2018-10-11 RX ADMIN — Medication 2000 MG: at 18:10

## 2018-10-11 RX ADMIN — Medication 2000 MG: at 05:51

## 2018-10-11 RX ADMIN — LEVOCARNITINE 750 MG: 1 SOLUTION ORAL at 18:11

## 2018-10-11 RX ADMIN — LACTULOSE 30 G: 10 SOLUTION ORAL at 10:00

## 2018-10-11 RX ADMIN — PERAMPANEL 8 MG: 4 TABLET ORAL at 21:11

## 2018-10-11 RX ADMIN — TOPIRAMATE 200 MG: 100 TABLET, FILM COATED ORAL at 21:12

## 2018-10-11 RX ADMIN — LACTULOSE 30 G: 10 SOLUTION ORAL at 21:17

## 2018-10-11 RX ADMIN — RUFINAMIDE 1200 MG: 200 TABLET, FILM COATED ORAL at 18:10

## 2018-10-11 RX ADMIN — MUPIROCIN 1 APPLICATION: 20 OINTMENT TOPICAL at 21:13

## 2018-10-11 RX ADMIN — VALPROIC ACID 250 MG: 500 SOLUTION ORAL at 10:00

## 2018-10-11 RX ADMIN — VANCOMYCIN HYDROCHLORIDE 750 MG: 750 INJECTION, POWDER, LYOPHILIZED, FOR SOLUTION INTRAVENOUS at 23:23

## 2018-10-11 RX ADMIN — VALPROIC ACID 250 MG: 500 SOLUTION ORAL at 18:10

## 2018-10-11 RX ADMIN — LEVOCARNITINE 750 MG: 1 SOLUTION ORAL at 21:14

## 2018-10-11 RX ADMIN — HEPARIN SODIUM 5000 UNITS: 5000 INJECTION, SOLUTION INTRAVENOUS; SUBCUTANEOUS at 21:12

## 2018-10-11 RX ADMIN — RUFINAMIDE 1200 MG: 200 TABLET, FILM COATED ORAL at 10:00

## 2018-10-11 RX ADMIN — LACTOBACILLUS ACIDOPHILUS / LACTOBACILLUS BULGARICUS 1 PACKET: 100 MILLION CFU STRENGTH GRANULES at 18:11

## 2018-10-11 RX ADMIN — MUPIROCIN 1 APPLICATION: 20 OINTMENT TOPICAL at 10:01

## 2018-10-11 RX ADMIN — LACTOBACILLUS ACIDOPHILUS / LACTOBACILLUS BULGARICUS 1 PACKET: 100 MILLION CFU STRENGTH GRANULES at 21:16

## 2018-10-11 RX ADMIN — LACTOBACILLUS ACIDOPHILUS / LACTOBACILLUS BULGARICUS 1 PACKET: 100 MILLION CFU STRENGTH GRANULES at 10:01

## 2018-10-11 RX ADMIN — LEVOCARNITINE 750 MG: 1 SOLUTION ORAL at 10:01

## 2018-10-11 RX ADMIN — LACTULOSE 30 G: 10 SOLUTION ORAL at 18:10

## 2018-10-11 RX ADMIN — LEVETIRACETAM 1000 MG: 100 SOLUTION ORAL at 21:17

## 2018-10-11 RX ADMIN — HEPARIN SODIUM 5000 UNITS: 5000 INJECTION, SOLUTION INTRAVENOUS; SUBCUTANEOUS at 05:53

## 2018-10-11 RX ADMIN — TOPIRAMATE 200 MG: 100 TABLET, FILM COATED ORAL at 10:00

## 2018-10-11 RX ADMIN — VALPROIC ACID 250 MG: 500 SOLUTION ORAL at 03:00

## 2018-10-11 RX ADMIN — LEVETIRACETAM 1000 MG: 100 SOLUTION ORAL at 10:00

## 2018-10-11 RX ADMIN — VANCOMYCIN HYDROCHLORIDE 750 MG: 750 INJECTION, POWDER, LYOPHILIZED, FOR SOLUTION INTRAVENOUS at 06:44

## 2018-10-11 RX ADMIN — SODIUM CHLORIDE 50 ML/HR: 0.9 INJECTION, SOLUTION INTRAVENOUS at 10:03

## 2018-10-11 NOTE — PROGRESS NOTES
Progress Note - Yuri Akhtar 1980, 41 y o  female MRN: [id number]    Unit/Bed#: 403-01 Encounter: [id number]    Primary Care Provider: Bianka Fay DO   Date and time admitted to hospital: 10/7/2018  3:13 PM        * Sepsis [facility])   Assessment & Plan    · Probable evolving sepsis secondary to probable aspiration pneumonia/aspiration pneumonitis  · Follow culture results  · Follow the procalcitonin level  · Continue IV vancomycin and IV cefepime until the procalcitonin level normalizes  · The lactic acidosis resolved  · Continue normal saline IV fluids at 50 mL/hr     Aspiration pneumonia (HCC)   Assessment & Plan    · Probable evolving sepsis secondary to probable aspiration pneumonia/aspiration pneumonitis  · Follow culture results  · Follow the procalcitonin level  · Continue IV vancomycin and IV cefepime until the procalcitonin level normalizes  · The lactic acidosis resolved  · Continue normal saline IV fluids at 50 mL/hr         Hypoalbuminemia   Assessment & Plan    · Nutrition/dietitian consultation     Elevated MCV   Assessment & Plan    · Check a vitamin B12 level and folate level     MRSA colonization   Assessment & Plan    · The patient will be maintained on contact precautions  She will be placed on nasal bactroban/mupirocin 2% ointment applied to both nares BID for 5 days  The patient will also need MRSA decolonization at [facility] including chlorhexidine/hibiclens rinse applied from the neck down to the toes daily with bathing/showering for 2 weeks  She will need a MRSA nasal screen rechecked in 2 weeks after decolonization         Skin breakdown   Assessment & Plan    · Wound care consult     Transaminitis   Assessment & Plan    · Possibly medication-related versus secondary to sepsis  · Avoid all hepatotoxic agents  · Follow the liver function tests  · An acute hepatitis panel was completed with the following results:  Results for Michele Damian (MRN [id number]) as of 10/11/2018 14:01   Ref  Range 10/10/2018 05:59   HEPATITIS A IGM ANTIBODY Latest Ref Range: Non-reactive, Equivocal-Suggest Recollect  Non-reactive   HEPATITIS B SURFACE ANTIGEN Latest Ref Range: Non-reactive, NonReactive - Confirmed  Non-reactive   HEPATITIS B CORE IGM ANTIBODY Latest Ref Range: Non-reactive  Non-reactive   HEPATITIS C ANTIBODY Latest Ref Range: Non-reactive  Non-reactive        Lactic acidosis   Assessment & Plan    · Resolved     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with tonic seizures (HCC)   Assessment & Plan    · Seizure precautions  · Continue to monitor for any seizure activity  · Continue her current seizure medication regimen         VTE Pharmacologic Prophylaxis:   Pharmacologic: Heparin  Mechanical VTE Prophylaxis in Place: Yes    Patient Centered Rounds: I have performed bedside rounds with nursing staff today  Education and Discussions with Family / Patient:  I updated the patient's father, Alison Kuo, on the telephone  Time Spent for Care: 20 minutes  More than 50% of total time spent on counseling and coordination of care as described above  Current Length of Stay: 4 day(s)    Current Patient Status: Inpatient   Certification Statement: The patient will continue to require additional inpatient hospital stay due to the need for IV antibiotics  Code Status: Level 1 - Full Code      Subjective: The patient was seen and examined  The patient is non-verbal and cannot express any complaints  Objective:     Vitals:   Temp (24hrs), Av 9 °F (36 6 °C), Min:97 7 °F (36 5 °C), Max:98 4 °F (36 9 °C)    Temp:  [97 7 °F (36 5 °C)-98 4 °F (36 9 °C)] 97 7 °F (36 5 °C)  HR:  [73-83] 73  Resp:  [16-18] 18  BP: ()/(63-79) 98/63  SpO2:  [94 %-97 %] 94 %  Body mass index is 19 46 kg/m²  Input and Output Summary (last 24 hours):        Intake/Output Summary (Last 24 hours) at 10/11/18 1417  Last data filed at 10/11/18 0915   Gross per 24 hour   Intake              400 ml   Output             1200 ml Net             -800 ml       Physical Exam:     Physical Exam  General:  NAD, awake  HEENT:  NC/AT, mucous membranes moist  Neck:  Supple, No JVP elevation  CV:  + S1, + S2, RRR  Pulm:  Lung fields are CTA bilaterally  Abd:  Soft, Non-tender, Non-distended  Ext:  No clubbing/cyanosis/edema  Skin:  No rashes      Additional Data:     Labs:      Results from last 7 days  Lab Units 10/11/18  0439   WBC Thousand/uL 8 71   HEMOGLOBIN g/dL 12 1   HEMATOCRIT % 36 4   PLATELETS Thousands/uL 182   NEUTROS PCT % 47   LYMPHS PCT % 37   MONOS PCT % 10   EOS PCT % 4       Results from last 7 days  Lab Units 10/11/18  0439   SODIUM mmol/L 137   POTASSIUM mmol/L 3 6   CHLORIDE mmol/L 104   CO2 mmol/L 25   BUN mg/dL 8   CREATININE mg/dL 0 42*   ANION GAP mmol/L 8   CALCIUM mg/dL 8 2*   ALBUMIN g/dL 2 1*   TOTAL BILIRUBIN mg/dL 0 40   ALK PHOS U/L 128*   ALT U/L 72   AST U/L 80*       Results from last 7 days  Lab Units 10/08/18  0338   INR  1 35*               Results from last 7 days  Lab Units 10/11/18  0439 10/10/18  0559 10/09/18  0603 10/08/18  1339 10/08/18  0921 10/08/18  0614  10/07/18  1552   LACTIC ACID mmol/L  --  1 9  --  2 2* 2 4* 2 0  < > 2 4*   PROCALCITONIN ng/ml 0 79* 1 11* 1 63*  --   --   --   --  0 12   < > = values in this interval not displayed  * I Have Reviewed All Lab Data Listed Above  * Additional Pertinent Lab Tests Reviewed: Maddie 66 Admission Reviewed      Recent Cultures (last 7 days):       Results from last 7 days  Lab Units 10/09/18  1336 10/07/18  1613 10/07/18  1552   BLOOD CULTURE   --  No Growth at 72 hrs  No Growth at 72 hrs     INFLUENZA A PCR  None Detected  --   --    INFLUENZA B PCR  None Detected  --   --    RSV PCR  None Detected  --   --        Last 24 Hours Medication List:     Current Facility-Administered Medications:  acetaminophen 650 mg Per PEG Tube Q6H PRN Elyse Newsome DO    cefepime 2,000 mg Intravenous Q12H Chi Jerome PA-C Last Rate: 2,000 mg (10/11/18 0551)   heparin (porcine) 5,000 Units Subcutaneous [facility] Eli [place], PA-C    lactobacillus acidophilus-bulgaricus 1 packet Oral TID Eli [place], PA-C    lactulose 30 g Per G Tube TID Eli [place], PA-C    levETIRAcetam 1,000 mg Per G Tube Q12H [address] Eli [place], PA-C    levOCARNitine 750 mg Per G Tube TID Eli [place], PA-C    mupirocin  Nasal Q12H [address] Joaquín Schmidt DO    Perampanel 8 mg Per G Tube HS Eli [place], PA-C    PHENobarbital 60 mg Oral HS Eli [place], PA-C    rufinamide 1,200 mg Per G Tube BID With Meals Eli [place], PA-C    sodium chloride 50 mL/hr Intravenous Continuous Joaquín Schmidt DO Last Rate: 50 mL/hr (10/11/18 1003)   topiramate 200 mg Oral Q12H [address] Eli [place], PA-C    valproic acid 250 mg Per G Tube Q8H [facility], PA-C    vancomycin 15 mg/kg Intravenous Q8H [facility] [place], WAGNER Last Rate: 750 mg (10/11/18 1337)        Today, Patient Was Seen By: Joaquín Schmidt DO    ** Please Note: Dictation voice to text software may have been used in the creation of this document   **

## 2018-10-11 NOTE — UTILIZATION REVIEW
Continued Stay Review  Date: 10/11/18  Vital Signs: BP 98/63 (BP Location: Right arm)   Pulse 73   Temp 97 7 °F (36 5 °C) (Temporal)   Resp 18   Ht 5' (1 524 m)   Wt 45 2 kg (99 lb 10 4 oz)   LMP  (LMP Unknown)   SpO2 94%   BMI 19 46 kg/m²   Medications:   Scheduled Meds:   Current Facility-Administered Medications:  acetaminophen 650 mg Per PEG Tube Q6H PRN Rosio Chacon DO    cefepime 2,000 mg Intravenous Q12H Anitha Flaherty PA-C Last Rate: 2,000 mg (10/11/18 0551)   heparin (porcine) 5,000 Units Subcutaneous [facility] Anitha Flaherty PA-C    lactobacillus acidophilus-bulgaricus 1 packet Oral TID Anitha Flaherty PA-C    lactulose 30 g Per G Tube TID Anitha Flaherty PA-C    levETIRAcetam 1,000 mg Per G Tube Q12H [address] eRy Guerrero PA-C    levOCARNitine 750 mg Per G Tube TID Anitha Flaherty PA-C    mupirocin  Nasal Q12H [address] Rosio Chacon DO    Perampanel 8 mg Per G Tube HS Anitha Flaherty PA-C    PHENobarbital 60 mg Oral HS Anitha Flaherty PA-C    rufinamide 1,200 mg Per G Tube BID With Meals Anitha Flaherty PA-C    sodium chloride 50 mL/hr Intravenous Continuous Rosio Chacon DO Last Rate: 50 mL/hr (10/11/18 1003)   topiramate 200 mg Oral Q12H [address] Rey Guerrero PA-C    valproic acid 250 mg Per G Tube Q8H Anitha Flaherty PA-C    vancomycin 15 mg/kg Intravenous Q8H Rey Barron PA-C Last Rate: 750 mg (10/11/18 1337)   Continuous Infusions:   sodium chloride 50 mL/hr Last Rate: 50 mL/hr (10/11/18 1003)   PRN Meds:   acetaminophen  Abnormal Labs/Diagnostic Results:   CR 0 42 CA 8 2 AST 80 ALK PHOS 128 TPROT 5 9 ALB 2 1   Procalcitonin <=0 25 ng/ml 0 79    Age/Sex: 41 y o  female     Assessment/Plan:   Sepsis ([facility] [address] )   Assessment & Plan     · Probable evolving sepsis secondary to probable aspiration pneumonia/aspiration pneumonitis  · Follow culture results  · Follow the procalcitonin level  · Continue IV vancomycin and IV cefepime until the procalcitonin level normalizes  · The lactic acidosis resolved  · Continue normal saline IV fluids at 50 mL/hr      Aspiration pneumonia (HCC)   Assessment & Plan     · Probable evolving sepsis secondary to probable aspiration pneumonia/aspiration pneumonitis  · Follow culture results  · Follow the procalcitonin level  · Continue IV vancomycin and IV cefepime until the procalcitonin level normalizes  · The lactic acidosis resolved  · Continue normal saline IV fluids at 50 mL/hr     Discharge Plan: TBD

## 2018-10-11 NOTE — ASSESSMENT & PLAN NOTE
· Probable evolving sepsis secondary to probable aspiration pneumonia/aspiration pneumonitis  · Follow culture results  · Follow the procalcitonin level  · Continue IV vancomycin and IV cefepime until the procalcitonin level normalizes  · The lactic acidosis resolved  · Continue normal saline IV fluids at 50 mL/hr

## 2018-10-11 NOTE — ASSESSMENT & PLAN NOTE
· Possibly medication-related versus secondary to sepsis  · Avoid all hepatotoxic agents  · Follow the liver function tests  · An acute hepatitis panel was completed with the following results:  Results for Vikash East (MRN [id number]) as of 10/11/2018 14:01   Ref   Range 10/10/2018 05:59   HEPATITIS A IGM ANTIBODY Latest Ref Range: Non-reactive, Equivocal-Suggest Recollect  Non-reactive   HEPATITIS B SURFACE ANTIGEN Latest Ref Range: Non-reactive, NonReactive - Confirmed  Non-reactive   HEPATITIS B CORE IGM ANTIBODY Latest Ref Range: Non-reactive  Non-reactive   HEPATITIS C ANTIBODY Latest Ref Range: Non-reactive  Non-reactive

## 2018-10-11 NOTE — ASSESSMENT & PLAN NOTE
· The patient will be maintained on contact precautions  She will be placed on nasal bactroban/mupirocin 2% ointment applied to both nares BID for 5 days  The patient will also need MRSA decolonization at [facility] including chlorhexidine/hibiclens rinse applied from the neck down to the toes daily with bathing/showering for 2 weeks  She will need a MRSA nasal screen rechecked in 2 weeks after decolonization

## 2018-10-11 NOTE — CONSULTS
Consult received for hypoalbuminemia  Patient receiving enteral feedings of Jevity 1 2 at 45 mL hr   Energy needs range 5833-5995 calories and protein need range 56-73 g  Current feedings providing 1296 calories and 60 grams protein  Albumin level has not changed since admission and is not an indicator of malnutrition  However if desired feeding can be increased to 50 mL hr with one pack prosource to provide 1500 calories and 82 gram protein  Continue to monitor

## 2018-10-11 NOTE — PLAN OF CARE
Problem: Potential for Falls  Goal: Patient will remain free of falls  INTERVENTIONS:  - Assess patient frequently for physical needs  -  Identify cognitive and physical deficits and behaviors that affect risk of falls  -  Blanding fall precautions as indicated by assessment   - Educate patient/family on patient safety including physical limitations  - Instruct patient to call for assistance with activity based on assessment  - Modify environment to reduce risk of injury  - Consider OT/PT consult to assist with strengthening/mobility   Outcome: Progressing      Problem: Prexisting or High Potential for Compromised Skin Integrity  Goal: Skin integrity is maintained or improved  INTERVENTIONS:  - Identify patients at risk for skin breakdown  - Assess and monitor skin integrity  - Assess and monitor nutrition and hydration status  - Monitor labs (i e  albumin)  - Assess for incontinence   - Turn and reposition patient  - Assist with mobility/ambulation  - Relieve pressure over bony prominences  - Avoid friction and shearing  - Provide appropriate hygiene as needed including keeping skin clean and dry  - Evaluate need for skin moisturizer/barrier cream  - Collaborate with interdisciplinary team (i e  Nutrition, Rehabilitation, etc )   - Patient/family teaching   Outcome: Progressing      Problem: Nutrition/Hydration-ADULT  Goal: Nutrient/Hydration intake appropriate for improving, restoring or maintaining nutritional needs  Monitor and assess patient's nutrition/hydration status for malnutrition (ex- brittle hair, bruises, dry skin, pale skin and conjunctiva, muscle wasting, smooth red tongue, and disorientation)  Collaborate with interdisciplinary team and initiate plan and interventions as ordered  Monitor patient's weight and dietary intake as ordered or per policy  Utilize nutrition screening tool and intervene per policy   Determine patient's food preferences and provide high-protein, high-caloric foods as appropriate       INTERVENTIONS:  - Monitor oral intake, urinary output, labs, and treatment plans  - Assess nutrition and hydration status and recommend course of action  - Evaluate amount of meals eaten  - Assist patient with eating if necessary   - Allow adequate time for meals  - Recommend/ encourage appropriate diets, oral nutritional supplements, and vitamin/mineral supplements  - Order, calculate, and assess calorie counts as needed  - Recommend, monitor, and adjust tube feedings and TPN/PPN based on assessed needs  - Assess need for intravenous fluids  - Provide specific nutrition/hydration education as appropriate  - Include patient/family/caregiver in decisions related to nutrition   Outcome: Progressing      Problem: PAIN - ADULT  Goal: Verbalizes/displays adequate comfort level or baseline comfort level  Interventions:  - Encourage patient to monitor pain and request assistance  - Assess pain using appropriate pain scale  - Administer analgesics based on type and severity of pain and evaluate response  - Implement non-pharmacological measures as appropriate and evaluate response  - Consider cultural and social influences on pain and pain management  - Notify physician/advanced practitioner if interventions unsuccessful or patient reports new pain   Outcome: Progressing      Problem: INFECTION - ADULT  Goal: Absence or prevention of progression during hospitalization  INTERVENTIONS:  - Assess and monitor for signs and symptoms of infection  - Monitor lab/diagnostic results  - Monitor all insertion sites, i e  indwelling lines, tubes, and drains  - Ovid appropriate cooling/warming therapies per order  - Administer medications as ordered  - Instruct and encourage patient and family to use good hand hygiene technique  - Identify and instruct in appropriate isolation precautions for identified infection/condition   Outcome: Progressing    Goal: Absence of fever/infection during neutropenic period  INTERVENTIONS:  - Monitor WBC   Outcome: Completed Date Met: 10/11/18      Problem: SAFETY ADULT  Goal: Maintain or return to baseline ADL function  INTERVENTIONS:  -  Assess patient's ability to carry out ADLs; assess patient's baseline for ADL function and identify physical deficits which impact ability to perform ADLs (bathing, care of mouth/teeth, toileting, grooming, dressing, etc )  - Assess/evaluate cause of self-care deficits   - Assess range of motion  - Assess patient's mobility; develop plan if impaired  - Assess patient's need for assistive devices and provide as appropriate  - Encourage maximum independence but intervene and supervise when necessary  ¯ Involve family in performance of ADLs  ¯ Assess for home care needs following discharge   ¯ Request OT consult to assist with ADL evaluation and planning for discharge  ¯ Provide patient education as appropriate   Outcome: Progressing    Goal: Maintain or return mobility status to optimal level  INTERVENTIONS:  - Assess patient's baseline mobility status (ambulation, transfers, stairs, etc )    - Identify cognitive and physical deficits and behaviors that affect mobility  - Identify mobility aids required to assist with transfers and/or ambulation (gait belt, sit-to-stand, lift, walker, cane, etc )  - [place] fall precautions as indicated by assessment  - Record patient progress and toleration of activity level on Mobility SBAR; progress patient to next Phase/Stage  - Instruct patient to call for assistance with activity based on assessment  - Request Rehabilitation consult to assist with strengthening/weightbearing, etc    Outcome: Not Progressing      Problem: DISCHARGE PLANNING  Goal: Discharge to home or other facility with appropriate resources  INTERVENTIONS:  - Identify barriers to discharge w/patient and caregiver  - Arrange for needed discharge resources and transportation as appropriate  - Identify discharge learning needs (meds, wound care, etc )  - Arrange for interpretive services to assist at discharge as needed  - Refer to Case Management Department for coordinating discharge planning if the patient needs post-hospital services based on physician/advanced practitioner order or complex needs related to functional status, cognitive ability, or social support system   Outcome: Progressing      Problem: Knowledge Deficit  Goal: Patient/family/caregiver demonstrates understanding of disease process, treatment plan, medications, and discharge instructions  Complete learning assessment and assess knowledge base    Interventions:  - Provide teaching at level of understanding  - Provide teaching via preferred learning methods   Outcome: Progressing      Problem: DISCHARGE PLANNING - CARE MANAGEMENT  Goal: Discharge to post-acute care or home with appropriate resources  INTERVENTIONS:  - Conduct assessment to determine patient/family and health care team treatment goals, and need for post-acute services based on payer coverage, community resources, and patient preferences, and barriers to discharge  - Address psychosocial, clinical, and financial barriers to discharge as identified in assessment in conjunction with the patient/family and health care team  - Arrange appropriate level of post-acute services according to patient's   needs and preference and payer coverage in collaboration with the physician and health care team  - Communicate with and update the patient/family, physician, and health care team regarding progress on the discharge plan  - Arrange appropriate transportation to post-acute venues   Outcome: Progressing

## 2018-10-12 LAB
ALBUMIN SERPL BCP-MCNC: 2 G/DL (ref 3.5–5)
ALP SERPL-CCNC: 123 U/L (ref 46–116)
ALT SERPL W P-5'-P-CCNC: 71 U/L (ref 12–78)
ANION GAP SERPL CALCULATED.3IONS-SCNC: 7 MMOL/L (ref 4–13)
AST SERPL W P-5'-P-CCNC: 73 U/L (ref 5–45)
BACTERIA BLD CULT: NORMAL
BACTERIA BLD CULT: NORMAL
BACTERIA UR CULT: NORMAL
BASOPHILS # BLD AUTO: 0.04 THOUSANDS/ΜL (ref 0–0.1)
BASOPHILS NFR BLD AUTO: 1 % (ref 0–1)
BILIRUB SERPL-MCNC: 0.3 MG/DL (ref 0.2–1)
BUN SERPL-MCNC: 9 MG/DL (ref 5–25)
CALCIUM SERPL-MCNC: 8.3 MG/DL (ref 8.3–10.1)
CHLORIDE SERPL-SCNC: 105 MMOL/L (ref 100–108)
CO2 SERPL-SCNC: 26 MMOL/L (ref 21–32)
CREAT SERPL-MCNC: 0.47 MG/DL (ref 0.6–1.3)
EOSINOPHIL # BLD AUTO: 0.22 THOUSAND/ΜL (ref 0–0.61)
EOSINOPHIL NFR BLD AUTO: 3 % (ref 0–6)
ERYTHROCYTE [DISTWIDTH] IN BLOOD BY AUTOMATED COUNT: 13.4 % (ref 11.6–15.1)
GFR SERPL CREATININE-BSD FRML MDRD: 127 ML/MIN/1.73SQ M
GLUCOSE SERPL-MCNC: 110 MG/DL (ref 65–140)
HCT VFR BLD AUTO: 36.7 % (ref 34.8–46.1)
HGB BLD-MCNC: 11.9 G/DL (ref 11.5–15.4)
IMM GRANULOCYTES # BLD AUTO: 0.04 THOUSAND/UL (ref 0–0.2)
IMM GRANULOCYTES NFR BLD AUTO: 1 % (ref 0–2)
LYMPHOCYTES # BLD AUTO: 2.59 THOUSANDS/ΜL (ref 0.6–4.47)
LYMPHOCYTES NFR BLD AUTO: 35 % (ref 14–44)
MAGNESIUM SERPL-MCNC: 1.8 MG/DL (ref 1.6–2.6)
MCH RBC QN AUTO: 33.5 PG (ref 26.8–34.3)
MCHC RBC AUTO-ENTMCNC: 32.4 G/DL (ref 31.4–37.4)
MCV RBC AUTO: 103 FL (ref 82–98)
MONOCYTES # BLD AUTO: 0.86 THOUSAND/ΜL (ref 0.17–1.22)
MONOCYTES NFR BLD AUTO: 12 % (ref 4–12)
NEUTROPHILS # BLD AUTO: 3.65 THOUSANDS/ΜL (ref 1.85–7.62)
NEUTS SEG NFR BLD AUTO: 48 % (ref 43–75)
NRBC BLD AUTO-RTO: 0 /100 WBCS
PHOSPHATE SERPL-MCNC: 4.1 MG/DL (ref 2.7–4.5)
PLATELET # BLD AUTO: 180 THOUSANDS/UL (ref 149–390)
PMV BLD AUTO: 12.2 FL (ref 8.9–12.7)
POTASSIUM SERPL-SCNC: 3.6 MMOL/L (ref 3.5–5.3)
PROCALCITONIN SERPL-MCNC: 0.52 NG/ML
PROT SERPL-MCNC: 5.7 G/DL (ref 6.4–8.2)
RBC # BLD AUTO: 3.55 MILLION/UL (ref 3.81–5.12)
SODIUM SERPL-SCNC: 138 MMOL/L (ref 136–145)
WBC # BLD AUTO: 7.4 THOUSAND/UL (ref 4.31–10.16)

## 2018-10-12 PROCEDURE — 80053 COMPREHEN METABOLIC PANEL: CPT | Performed by: INTERNAL MEDICINE

## 2018-10-12 PROCEDURE — 85025 COMPLETE CBC W/AUTO DIFF WBC: CPT | Performed by: INTERNAL MEDICINE

## 2018-10-12 PROCEDURE — 83735 ASSAY OF MAGNESIUM: CPT | Performed by: INTERNAL MEDICINE

## 2018-10-12 PROCEDURE — 99232 SBSQ HOSP IP/OBS MODERATE 35: CPT | Performed by: INTERNAL MEDICINE

## 2018-10-12 PROCEDURE — 84100 ASSAY OF PHOSPHORUS: CPT | Performed by: INTERNAL MEDICINE

## 2018-10-12 PROCEDURE — 84145 PROCALCITONIN (PCT): CPT | Performed by: INTERNAL MEDICINE

## 2018-10-12 RX ORDER — POTASSIUM CHLORIDE 20MEQ/15ML
40 LIQUID (ML) ORAL ONCE
Status: COMPLETED | OUTPATIENT
Start: 2018-10-12 | End: 2018-10-12

## 2018-10-12 RX ORDER — ONDANSETRON 2 MG/ML
4 INJECTION INTRAMUSCULAR; INTRAVENOUS EVERY 6 HOURS PRN
Status: DISCONTINUED | OUTPATIENT
Start: 2018-10-12 | End: 2018-10-13 | Stop reason: HOSPADM

## 2018-10-12 RX ADMIN — LACTULOSE 30 G: 10 SOLUTION ORAL at 18:16

## 2018-10-12 RX ADMIN — LACTULOSE 30 G: 10 SOLUTION ORAL at 10:00

## 2018-10-12 RX ADMIN — SODIUM CHLORIDE 50 ML/HR: 0.9 INJECTION, SOLUTION INTRAVENOUS at 20:26

## 2018-10-12 RX ADMIN — TOPIRAMATE 200 MG: 100 TABLET, FILM COATED ORAL at 20:27

## 2018-10-12 RX ADMIN — HEPARIN SODIUM 5000 UNITS: 5000 INJECTION, SOLUTION INTRAVENOUS; SUBCUTANEOUS at 14:57

## 2018-10-12 RX ADMIN — MAGNESIUM OXIDE TAB 400 MG (241.3 MG ELEMENTAL MG) 400 MG: 400 (241.3 MG) TAB at 14:57

## 2018-10-12 RX ADMIN — VALPROIC ACID 250 MG: 500 SOLUTION ORAL at 02:06

## 2018-10-12 RX ADMIN — RUFINAMIDE 1200 MG: 200 TABLET, FILM COATED ORAL at 10:01

## 2018-10-12 RX ADMIN — VANCOMYCIN HYDROCHLORIDE 750 MG: 750 INJECTION, POWDER, LYOPHILIZED, FOR SOLUTION INTRAVENOUS at 22:16

## 2018-10-12 RX ADMIN — HEPARIN SODIUM 5000 UNITS: 5000 INJECTION, SOLUTION INTRAVENOUS; SUBCUTANEOUS at 05:24

## 2018-10-12 RX ADMIN — SODIUM CHLORIDE 50 ML/HR: 0.9 INJECTION, SOLUTION INTRAVENOUS at 02:06

## 2018-10-12 RX ADMIN — Medication 2000 MG: at 05:24

## 2018-10-12 RX ADMIN — TOPIRAMATE 200 MG: 100 TABLET, FILM COATED ORAL at 10:00

## 2018-10-12 RX ADMIN — LEVETIRACETAM 1000 MG: 100 SOLUTION ORAL at 10:02

## 2018-10-12 RX ADMIN — VANCOMYCIN HYDROCHLORIDE 750 MG: 750 INJECTION, POWDER, LYOPHILIZED, FOR SOLUTION INTRAVENOUS at 05:58

## 2018-10-12 RX ADMIN — POTASSIUM CHLORIDE 40 MEQ: 20 SOLUTION ORAL at 14:57

## 2018-10-12 RX ADMIN — LEVOCARNITINE 750 MG: 1 SOLUTION ORAL at 18:14

## 2018-10-12 RX ADMIN — PHENOBARBITAL 60 MG: 20 LIQUID ORAL at 22:13

## 2018-10-12 RX ADMIN — MUPIROCIN 1 APPLICATION: 20 OINTMENT TOPICAL at 10:02

## 2018-10-12 RX ADMIN — LACTOBACILLUS ACIDOPHILUS / LACTOBACILLUS BULGARICUS 1 PACKET: 100 MILLION CFU STRENGTH GRANULES at 10:00

## 2018-10-12 RX ADMIN — MAGNESIUM OXIDE TAB 400 MG (241.3 MG ELEMENTAL MG) 400 MG: 400 (241.3 MG) TAB at 20:27

## 2018-10-12 RX ADMIN — RUFINAMIDE 1200 MG: 200 TABLET, FILM COATED ORAL at 18:14

## 2018-10-12 RX ADMIN — PERAMPANEL 8 MG: 4 TABLET ORAL at 22:15

## 2018-10-12 RX ADMIN — LEVOCARNITINE 750 MG: 1 SOLUTION ORAL at 10:02

## 2018-10-12 RX ADMIN — HEPARIN SODIUM 5000 UNITS: 5000 INJECTION, SOLUTION INTRAVENOUS; SUBCUTANEOUS at 22:15

## 2018-10-12 RX ADMIN — MUPIROCIN 1 APPLICATION: 20 OINTMENT TOPICAL at 20:27

## 2018-10-12 RX ADMIN — ONDANSETRON HYDROCHLORIDE 4 MG: 2 SOLUTION INTRAMUSCULAR; INTRAVENOUS at 21:16

## 2018-10-12 RX ADMIN — LACTULOSE 30 G: 10 SOLUTION ORAL at 20:26

## 2018-10-12 RX ADMIN — LACTOBACILLUS ACIDOPHILUS / LACTOBACILLUS BULGARICUS 1 PACKET: 100 MILLION CFU STRENGTH GRANULES at 20:27

## 2018-10-12 RX ADMIN — LEVETIRACETAM 1000 MG: 100 SOLUTION ORAL at 20:27

## 2018-10-12 RX ADMIN — LEVOCARNITINE 750 MG: 1 SOLUTION ORAL at 20:27

## 2018-10-12 RX ADMIN — LACTOBACILLUS ACIDOPHILUS / LACTOBACILLUS BULGARICUS 1 PACKET: 100 MILLION CFU STRENGTH GRANULES at 18:14

## 2018-10-12 RX ADMIN — VANCOMYCIN HYDROCHLORIDE 750 MG: 750 INJECTION, POWDER, LYOPHILIZED, FOR SOLUTION INTRAVENOUS at 14:57

## 2018-10-12 RX ADMIN — VALPROIC ACID 250 MG: 500 SOLUTION ORAL at 18:14

## 2018-10-12 RX ADMIN — Medication 2000 MG: at 18:14

## 2018-10-12 RX ADMIN — VALPROIC ACID 250 MG: 500 SOLUTION ORAL at 10:01

## 2018-10-12 NOTE — ASSESSMENT & PLAN NOTE
Results for Sirisha Knowles (MRN [id number]) as of 10/12/2018 12:28   Ref   Range 10/11/2018 04:39   Folate Latest Ref Range: 3 1 - 17 5 ng/mL >20 0 (H)   Vitamin B-12 Latest Ref Range: 100 - 900 pg/mL 1,944 (H)

## 2018-10-12 NOTE — ASSESSMENT & PLAN NOTE
· Probable evolving sepsis secondary to probable aspiration pneumonia/aspiration pneumonitis  · The sepsis has now resolved  · Follow culture results  · Follow the procalcitonin level  · Continue IV vancomycin and IV cefepime until the procalcitonin level normalizes  · The lactic acidosis resolved  · Continue normal saline IV fluids at 50 mL/hr

## 2018-10-12 NOTE — ASSESSMENT & PLAN NOTE
· Possibly medication-related versus secondary to sepsis  · Avoid all hepatotoxic agents  · Follow the liver function tests  · An acute hepatitis panel was completed with the following results:  Results for Robel Cardenas (MRN [id number]) as of 10/11/2018 14:01   Ref   Range 10/10/2018 05:59   HEPATITIS A IGM ANTIBODY Latest Ref Range: Non-reactive, Equivocal-Suggest Recollect  Non-reactive   HEPATITIS B SURFACE ANTIGEN Latest Ref Range: Non-reactive, NonReactive - Confirmed  Non-reactive   HEPATITIS B CORE IGM ANTIBODY Latest Ref Range: Non-reactive  Non-reactive   HEPATITIS C ANTIBODY Latest Ref Range: Non-reactive  Non-reactive

## 2018-10-12 NOTE — PROGRESS NOTES
Progress Note - Paz Gary 1980, 41 y o  female MRN: [id number]    Unit/Bed#: 403-01 Encounter: [id number]    Primary Care Provider: Belinda Palmer DO   Date and time admitted to hospital: 10/7/2018  3:13 PM        * Sepsis [facility])   Assessment & Plan    · Probable evolving sepsis secondary to probable aspiration pneumonia/aspiration pneumonitis  · The sepsis has now resolved  · Follow culture results  · Follow the procalcitonin level  · Continue IV vancomycin and IV cefepime until the procalcitonin level normalizes  · The lactic acidosis resolved  · Continue normal saline IV fluids at 50 mL/hr     Aspiration pneumonia ([address] )   Assessment & Plan    · Probable evolving sepsis secondary to probable aspiration pneumonia/aspiration pneumonitis  · The sepsis has now resolved  · Follow culture results  · Follow the procalcitonin level  · Continue IV vancomycin and IV cefepime until the procalcitonin level normalizes  · The lactic acidosis resolved  · Continue normal saline IV fluids at 50 mL/hr         Hypoalbuminemia   Assessment & Plan    · Increase tube feedings per the dietitian's recommendations     Elevated MCV   Assessment & Plan    Results for Evon Awad (MRN [id number]) as of 10/12/2018 12:28   Ref  Range 10/11/2018 04:39   Folate Latest Ref Range: 3 1 - 17 5 ng/mL >20 0 (H)   Vitamin B-12 Latest Ref Range: 100 - 900 pg/mL 1,944 (H)        MRSA colonization   Assessment & Plan    · The patient will be maintained on contact precautions  She will be placed on nasal bactroban/mupirocin 2% ointment applied to both nares BID for 5 days  The patient will also need MRSA decolonization at [facility] including chlorhexidine/hibiclens rinse applied from the neck down to the toes daily with bathing/showering for 2 weeks  She will need a MRSA nasal screen rechecked in 2 weeks after decolonization         Skin breakdown   Assessment & Plan    · Wound care consult     Transaminitis   Assessment & Plan · Possibly medication-related versus secondary to sepsis  · Avoid all hepatotoxic agents  · Follow the liver function tests  · An acute hepatitis panel was completed with the following results:  Results for Stevo Henriquez (MRN [id number]) as of 10/11/2018 14:01   Ref  Range 10/10/2018 05:59   HEPATITIS A IGM ANTIBODY Latest Ref Range: Non-reactive, Equivocal-Suggest Recollect  Non-reactive   HEPATITIS B SURFACE ANTIGEN Latest Ref Range: Non-reactive, NonReactive - Confirmed  Non-reactive   HEPATITIS B CORE IGM ANTIBODY Latest Ref Range: Non-reactive  Non-reactive   HEPATITIS C ANTIBODY Latest Ref Range: Non-reactive  Non-reactive        Lactic acidosis   Assessment & Plan    · Resolved     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with tonic seizures (HCC)   Assessment & Plan    · Seizure precautions  · Continue to monitor for any seizure activity  · Continue her current seizure medication regimen         VTE Pharmacologic Prophylaxis:   Pharmacologic: Heparin  Mechanical VTE Prophylaxis in Place: Yes    Patient Centered Rounds: I have performed bedside rounds with nursing staff today  Education and Discussions with Family / Patient:  I updated the patient's father, Petr Cardozo, on the telephone on 10/11/2018  I left a message for the patient's mother, Julee Tucker, today (10/12/2018) requesting a return phone call  Time Spent for Care: 20 minutes  More than 50% of total time spent on counseling and coordination of care as described above  Current Length of Stay: 5 day(s)    Current Patient Status: Inpatient   Certification Statement: The patient will continue to require additional inpatient hospital stay due to the need for IV antibiotics  Discharge Plan:  The patient will likely be discharged back to [facility] on 10/13/2018 if her procalcitonin level normalizes  Code Status: Level 1 - Full Code      Subjective: The patient was seen and examined  No events overnight    The patient is non-verbal and cannot express any specific complaints  Objective:     Vitals:   Temp (24hrs), Av °F (36 1 °C), Min:96 2 °F (35 7 °C), Max:97 6 °F (36 4 °C)    Temp:  [96 2 °F (35 7 °C)-97 6 °F (36 4 °C)] 96 2 °F (35 7 °C)  HR:  [67-80] 71  Resp:  [16-18] 18  BP: ()/(62-71) 99/71  SpO2:  [94 %-96 %] 96 %  Body mass index is 20 97 kg/m²  Input and Output Summary (last 24 hours): Intake/Output Summary (Last 24 hours) at 10/12/18 1232  Last data filed at 10/12/18 0601   Gross per 24 hour   Intake             1000 ml   Output             1350 ml   Net             -350 ml       Physical Exam:     Physical Exam  General:  NAD, awake, responds to verbal and tactile stimuli  HEENT:  NC/AT, mucous membranes moist  Neck:  Supple, No JVP elevation  CV:  + S1, + S2, RRR  Pulm:  Scant bibasilar crackles  Abd:  Soft, Non-tender, Non-distended  Ext:  No clubbing/cyanosis/edema  Skin:  No erythema      Additional Data:     Labs:      Results from last 7 days  Lab Units 10/12/18  0453   WBC Thousand/uL 7 40   HEMOGLOBIN g/dL 11 9   HEMATOCRIT % 36 7   PLATELETS Thousands/uL 180   NEUTROS PCT % 48   LYMPHS PCT % 35   MONOS PCT % 12   EOS PCT % 3       Results from last 7 days  Lab Units 10/12/18  0453   SODIUM mmol/L 138   POTASSIUM mmol/L 3 6   CHLORIDE mmol/L 105   CO2 mmol/L 26   BUN mg/dL 9   CREATININE mg/dL 0 47*   ANION GAP mmol/L 7   CALCIUM mg/dL 8 3   ALBUMIN g/dL 2 0*   TOTAL BILIRUBIN mg/dL 0 30   ALK PHOS U/L 123*   ALT U/L 71   AST U/L 73*       Results from last 7 days  Lab Units 10/08/18  0338   INR  1 35*               Results from last 7 days  Lab Units 10/12/18  0453 10/11/18  0439 10/10/18  0559 10/09/18  0603 10/08/18  1339 10/08/18  0921 10/08/18  0614  10/07/18  1552   LACTIC ACID mmol/L  --   --  1 9  --  2 2* 2 4* 2 0  < > 2 4*   PROCALCITONIN ng/ml 0 52* 0 79* 1 11* 1 63*  --   --   --   --  0 12   < > = values in this interval not displayed  * I Have Reviewed All Lab Data Listed Above    * Additional Pertinent Lab Tests Reviewed: [address] Admission Reviewed        Recent Cultures (last 7 days):       Results from last 7 days  Lab Units 10/10/18  1838 10/09/18  1336 10/07/18  1613 10/07/18  1552   BLOOD CULTURE   --   --  No Growth After 4 Days  No Growth After 4 Days  URINE CULTURE  No Growth <1000 cfu/mL  --   --   --    INFLUENZA A PCR   --  None Detected  --   --    INFLUENZA B PCR   --  None Detected  --   --    RSV PCR   --  None Detected  --   --        Last 24 Hours Medication List:     Current Facility-Administered Medications:  acetaminophen 650 mg Per PEG Tube Q6H PRN Cinthya Diss, DO    cefepime 2,000 mg Intravenous Q12H Breanna Young PA-C Last Rate: 2,000 mg (10/12/18 0524)   heparin (porcine) 5,000 Units Subcutaneous [facility] CATIE Musa-BIGG    lactobacillus acidophilus-bulgaricus 1 packet Oral TID Breanna Young PA-C    lactulose 30 g Per G Tube TID CATIE Musa-BIGG    levETIRAcetam 1,000 mg Per G Tube Q12H [address] WAGNER Guerrero    levOCARNitine 750 mg Per G Tube TID CATIE Musa-BIGG    magnesium oxide 400 mg Per G Tube BID Cinthya Diss, DO    mupirocin  Nasal Q12H [address] Cinthya Diss, DO    Perampanel 8 mg Per G Tube HS Breanna Young PA-C    PHENobarbital 60 mg Oral HS CATIE Musa-C    potassium chloride 40 mEq Per G Tube Once Cinthya Diss, DO    rufinamide 1,200 mg Per G Tube BID With Meals Breanna Young PA-C    sodium chloride 50 mL/hr Intravenous Continuous Citnhya Diss, DO Last Rate: 50 mL/hr (10/12/18 0206)   topiramate 200 mg Oral Q12H [address] CATEI Musa-C    valproic acid 250 mg Per G Tube Q8H CATIE Musa-C    vancomycin 15 mg/kg Intravenous Q8H CATEI Musa-BIGG Last Rate: 750 mg (10/12/18 0558)        Today, Patient Was Seen By: Cinthya Sharp DO    ** Please Note: Dictation voice to text software may have been used in the creation of this document   **

## 2018-10-12 NOTE — ASSESSMENT & PLAN NOTE
· The patient will be maintained on contact precautions  She will be placed on nasal bactroban/mupirocin 2% ointment applied to both nares BID for 5 days  The patient will also need MRSA decolonization at [facility] including chlorhexidine/hibiclens rinse applied from the neck down to the toes daily with bathing/showering for 2 weeks  She will need a MRSA nasal screen rechecked in 2 weeks after decolonization

## 2018-10-12 NOTE — PLAN OF CARE
Problem: Potential for Falls  Goal: Patient will remain free of falls  INTERVENTIONS:  - Assess patient frequently for physical needs  -  Identify cognitive and physical deficits and behaviors that affect risk of falls  Patient is non-verbal  -  [place] fall precautions as indicated by assessment  High fall risk   - Educate patient/family on patient safety including physical limitations  - Instruct patient to call for assistance with activity based on assessment, patient unable to call for assistance, frequent bed checks for needs necessary  - Modify environment to reduce risk of injury  - Consider OT/PT consult to assist with strengthening/mobility   Outcome: Progressing      Problem: Prexisting or High Potential for Compromised Skin Integrity  Goal: Skin integrity is maintained or improved  INTERVENTIONS:  - Identify patients at risk for skin breakdown  - Assess and monitor skin integrity  - Assess and monitor nutrition and hydration status  - Monitor labs (i e  albumin)  - Assess for incontinence Change frequently to minimize moisture contact with skin  - Turn and reposition patient every 2 hours and prn  - Relieve pressure over bony prominences  - Avoid friction and shearing  - Provide appropriate hygiene as needed including keeping skin clean and dry  - Evaluate need for skin moisturizer/barrier cream  - Collaborate with interdisciplinary team (i e  Nutrition, Rehabilitation, etc )   - Patient/family teaching   Outcome: Progressing      Problem: Nutrition/Hydration-ADULT  Goal: Nutrient/Hydration intake appropriate for improving, restoring or maintaining nutritional needs  Monitor and assess patient's nutrition/hydration status for malnutrition (ex- brittle hair, bruises, dry skin, pale skin and conjunctiva, muscle wasting, smooth red tongue, and disorientation)  Collaborate with interdisciplinary team and initiate plan and interventions as ordered    Monitor patient's weight and dietary intake as ordered or per policy  Utilize nutrition screening tool and intervene per policy      INTERVENTIONS:  - Monitor urinary output, labs, and treatment plans  - Assess nutrition and hydration status and recommend course of action  - Recommend/ encourage appropriate diets, nutritional supplements, and vitamin/mineral supplements  - Order, calculate, and assess calorie counts as needed  - Recommend, monitor, and adjust tube feedings based on assessed needs  - Assess need for intravenous fluids  - Provide specific nutrition/hydration education as appropriate  - Include patient/family/caregiver in decisions related to nutrition   Outcome: Progressing      Problem: PAIN - ADULT  Goal: Verbalizes/displays adequate comfort level or baseline comfort level  Interventions:  - Encourage patient to monitor pain and request assistance  - Assess pain using appropriate pain scale, FLACC pain scale  - Administer analgesics based on type and severity of pain and evaluate response  - Implement non-pharmacological measures as appropriate and evaluate response  - Consider cultural and social influences on pain and pain management  - Notify physician/advanced practitioner if interventions unsuccessful or patient reports new pain   Outcome: Progressing      Problem: INFECTION - ADULT  Goal: Absence or prevention of progression during hospitalization  INTERVENTIONS:  - Assess and monitor for signs and symptoms of infection  - Monitor lab/diagnostic results  - Monitor all insertion sites, i e  indwelling lines, tubes, and drains  - [place] appropriate cooling/warming therapies per order  - Administer medications as ordered  - Instruct and encourage patient and family to use good hand hygiene technique  - Identify and instruct in appropriate isolation precautions for identified infection/condition Contact precautions  Outcome: Progressing      Problem: SAFETY ADULT  Goal: Maintain or return to baseline ADL function  INTERVENTIONS:  -  Assess patient's ability to carry out ADLs; assess patient's baseline for ADL function and identify physical deficits which impact ability to perform ADLs Dependent for all needs and ADLs  - Assess range of motion, Patient has contractures of extremities  - Assess patient's mobility; Patient is bedbound  - Assess patient's need for assistive devices and provide as appropriate  ¯ Involve family in performance of ADLs  ¯ Assess for home care needs following discharge   ¯ Request OT consult to assist with ADL evaluation and planning for discharge  ¯ Provide patient education as appropriate   Outcome: Progressing    Goal: Maintain or return mobility status to optimal level  INTERVENTIONS:  - Assess patient's baseline mobility status Baseline: Bed bound  - Identify cognitive and physical deficits and behaviors that affect mobility  - Identify mobility aids required to assist with transfers and/or ambulation Sling/lift  - [place] fall precautions as indicated by assessment   High fall risk   - Record patient progress and toleration of activity level on Mobility SBAR; progress patient to next Phase/Stage  - Request Rehabilitation consult to assist with strengthening/weightbearing, etc    Outcome: Progressing      Problem: DISCHARGE PLANNING  Goal: Discharge to home or other facility with appropriate resources  INTERVENTIONS:  - Identify barriers to discharge w/patient and caregiver  - Arrange for needed discharge resources and transportation as appropriate  - Identify discharge learning needs (meds, wound care, etc )  - Arrange for interpretive services to assist at discharge as needed  - Refer to Case Management Department for coordinating discharge planning if the patient needs post-hospital services based on physician/advanced practitioner order or complex needs related to functional status, cognitive ability, or social support system   Outcome: Progressing      Problem: Knowledge Deficit  Goal: Patient/family/caregiver demonstrates understanding of disease process, treatment plan, medications, and discharge instructions  Complete learning assessment and assess knowledge base    Interventions:  - Provide teaching at level of understanding  - Provide teaching via preferred learning methods   Outcome: Progressing      Problem: DISCHARGE PLANNING - CARE MANAGEMENT  Goal: Discharge to post-acute care or home with appropriate resources  INTERVENTIONS:  - Conduct assessment to determine patient/family and health care team treatment goals, and need for post-acute services based on payer coverage, community resources, and patient preferences, and barriers to discharge  - Address psychosocial, clinical, and financial barriers to discharge as identified in assessment in conjunction with the patient/family and health care team  - Arrange appropriate level of post-acute services according to patient's   needs and preference and payer coverage in collaboration with the physician and health care team  - Communicate with and update the patient/family, physician, and health care team regarding progress on the discharge plan  - Arrange appropriate transportation to post-acute venues   Outcome: Progressing

## 2018-10-13 VITALS
RESPIRATION RATE: 18 BRPM | TEMPERATURE: 98 F | SYSTOLIC BLOOD PRESSURE: 108 MMHG | OXYGEN SATURATION: 97 % | HEART RATE: 89 BPM | BODY MASS INDEX: 22.98 KG/M2 | WEIGHT: 117.06 LBS | DIASTOLIC BLOOD PRESSURE: 67 MMHG | HEIGHT: 60 IN

## 2018-10-13 PROBLEM — I51.7 RIGHT ATRIAL ENLARGEMENT: Status: ACTIVE | Noted: 2018-10-13

## 2018-10-13 LAB
ALBUMIN SERPL BCP-MCNC: 2.2 G/DL (ref 3.5–5)
ALP SERPL-CCNC: 127 U/L (ref 46–116)
ALT SERPL W P-5'-P-CCNC: 78 U/L (ref 12–78)
ANION GAP SERPL CALCULATED.3IONS-SCNC: 7 MMOL/L (ref 4–13)
AST SERPL W P-5'-P-CCNC: 87 U/L (ref 5–45)
BILIRUB SERPL-MCNC: 0.5 MG/DL (ref 0.2–1)
BUN SERPL-MCNC: 8 MG/DL (ref 5–25)
CALCIUM SERPL-MCNC: 8.9 MG/DL (ref 8.3–10.1)
CHLORIDE SERPL-SCNC: 106 MMOL/L (ref 100–108)
CO2 SERPL-SCNC: 27 MMOL/L (ref 21–32)
CREAT SERPL-MCNC: 0.51 MG/DL (ref 0.6–1.3)
GFR SERPL CREATININE-BSD FRML MDRD: 123 ML/MIN/1.73SQ M
GLUCOSE SERPL-MCNC: 92 MG/DL (ref 65–140)
MAGNESIUM SERPL-MCNC: 1.9 MG/DL (ref 1.6–2.6)
POTASSIUM SERPL-SCNC: 4.5 MMOL/L (ref 3.5–5.3)
PROCALCITONIN SERPL-MCNC: 0.42 NG/ML
PROT SERPL-MCNC: 6.4 G/DL (ref 6.4–8.2)
SODIUM SERPL-SCNC: 140 MMOL/L (ref 136–145)

## 2018-10-13 PROCEDURE — 83735 ASSAY OF MAGNESIUM: CPT | Performed by: INTERNAL MEDICINE

## 2018-10-13 PROCEDURE — 80053 COMPREHEN METABOLIC PANEL: CPT | Performed by: INTERNAL MEDICINE

## 2018-10-13 PROCEDURE — 84145 PROCALCITONIN (PCT): CPT | Performed by: INTERNAL MEDICINE

## 2018-10-13 PROCEDURE — 99239 HOSP IP/OBS DSCHRG MGMT >30: CPT | Performed by: INTERNAL MEDICINE

## 2018-10-13 RX ORDER — CHLORHEXIDINE GLUCONATE 4 G/100ML
1 SOLUTION TOPICAL DAILY
Qty: 120 ML | Refills: 0
Start: 2018-10-13 | End: 2019-01-10

## 2018-10-13 RX ORDER — LACTULOSE 20 G/30ML
30 SOLUTION ORAL 3 TIMES DAILY
Refills: 0 | Status: ON HOLD
Start: 2018-10-13 | End: 2019-01-17

## 2018-10-13 RX ADMIN — VANCOMYCIN HYDROCHLORIDE 750 MG: 750 INJECTION, POWDER, LYOPHILIZED, FOR SOLUTION INTRAVENOUS at 05:39

## 2018-10-13 RX ADMIN — TOPIRAMATE 200 MG: 100 TABLET, FILM COATED ORAL at 08:46

## 2018-10-13 RX ADMIN — HEPARIN SODIUM 5000 UNITS: 5000 INJECTION, SOLUTION INTRAVENOUS; SUBCUTANEOUS at 05:00

## 2018-10-13 RX ADMIN — LACTULOSE 30 G: 10 SOLUTION ORAL at 16:00

## 2018-10-13 RX ADMIN — LACTULOSE 30 G: 10 SOLUTION ORAL at 08:46

## 2018-10-13 RX ADMIN — VALPROIC ACID 250 MG: 500 SOLUTION ORAL at 11:20

## 2018-10-13 RX ADMIN — LACTOBACILLUS ACIDOPHILUS / LACTOBACILLUS BULGARICUS 1 PACKET: 100 MILLION CFU STRENGTH GRANULES at 16:00

## 2018-10-13 RX ADMIN — VALPROIC ACID 250 MG: 500 SOLUTION ORAL at 02:27

## 2018-10-13 RX ADMIN — Medication 2000 MG: at 05:00

## 2018-10-13 RX ADMIN — RUFINAMIDE 1200 MG: 200 TABLET, FILM COATED ORAL at 16:00

## 2018-10-13 RX ADMIN — MUPIROCIN 1 APPLICATION: 20 OINTMENT TOPICAL at 08:47

## 2018-10-13 RX ADMIN — RUFINAMIDE 1200 MG: 200 TABLET, FILM COATED ORAL at 08:46

## 2018-10-13 RX ADMIN — HEPARIN SODIUM 5000 UNITS: 5000 INJECTION, SOLUTION INTRAVENOUS; SUBCUTANEOUS at 16:00

## 2018-10-13 RX ADMIN — LEVOCARNITINE 750 MG: 1 SOLUTION ORAL at 08:46

## 2018-10-13 RX ADMIN — LACTOBACILLUS ACIDOPHILUS / LACTOBACILLUS BULGARICUS 1 PACKET: 100 MILLION CFU STRENGTH GRANULES at 08:46

## 2018-10-13 RX ADMIN — LEVOCARNITINE 750 MG: 1 SOLUTION ORAL at 16:00

## 2018-10-13 RX ADMIN — LEVETIRACETAM 1000 MG: 100 SOLUTION ORAL at 08:46

## 2018-10-13 NOTE — SOCIAL WORK
The patient',s d/c was faxed to the [facility] at [place] and they are aware that she is returning there

## 2018-10-13 NOTE — ASSESSMENT & PLAN NOTE
Results for Giselaelias Ritesh (MRN [id number]) as of 10/12/2018 12:28   Ref   Range 10/11/2018 04:39   Folate Latest Ref Range: 3 1 - 17 5 ng/mL >20 0 (H)   Vitamin B-12 Latest Ref Range: 100 - 900 pg/mL 1,944 (H)

## 2018-10-13 NOTE — DISCHARGE SUMMARY
Discharge- [facility] COTTAGE 1980, 41 y o  female MRN: [id number]    Unit/Bed#: 403-01 Encounter: [id number]    Primary Care Provider: Zarina Galicia DO   Date and time admitted to hospital: 10/7/2018  3:13 PM        * Sepsis [facility])   Assessment & Plan    · Probable sepsis secondary to probable aspiration pneumonia/aspiration pneumonitis  · The sepsis resolved by the time of discharge  · The lactic acidosis resolved during the hospitalization  · She was treated with a course of IV vancomycin and IV cefepime during hospitalization    Procedure Component Value - Date/Time   Urine culture [[id number]] Collected: 10/10/18 1838   Lab Status: Final result Specimen: Urine from Urine, Clean Catch Updated: 10/12/18 0710    Urine Culture No Growth <1000 cfu/mL   Influenza A/B and RSV by PCR (Indicated for patients > 2 mo of age) [[id number]] (Normal) Collected: 10/09/18 1336   Lab Status: Final result Specimen: Nasopharyngeal from Nasopharyngeal Swab Updated: 10/10/18 0323    INFLU A PCR None Detected    INFLU B PCR None Detected    RSV PCR None Detected   MRSA culture [[id number]] (Abnormal) Collected: 10/08/18 0123   Lab Status: Final result Specimen: Nares from Nose Updated: 10/11/18 0804    MRSA Culture Only Methicillin Resistant Staphylococcus aureus isolated (A)     Please note: This patient requires contact precautions  Blood culture #1 [[id number]] Collected: 10/07/18 1613   Lab Status: Final result Specimen: Blood from Arm, Left Updated: 10/12/18 2001    Blood Culture No Growth After 5 Days  Blood culture #2 [[id number]] Collected: 10/07/18 1552   Lab Status: Final result Specimen: Blood from Hand, Left Updated: 10/12/18 2001    Blood Culture No Growth After 5 Days          Aspiration pneumonia ([address] )   Assessment & Plan    · Probable sepsis secondary to probable aspiration pneumonia/aspiration pneumonitis  · The sepsis resolved by the time of discharge  · She was treated with a course of IV vancomycin and IV cefepime during hospitalization    Procedure Component Value - Date/Time   Urine culture [[id number]] Collected: 10/10/18 1838   Lab Status: Final result Specimen: Urine from Urine, Clean Catch Updated: 10/12/18 0710    Urine Culture No Growth <1000 cfu/mL   Influenza A/B and RSV by PCR (Indicated for patients > 2 mo of age) [[id number]] (Normal) Collected: 10/09/18 1336   Lab Status: Final result Specimen: Nasopharyngeal from Nasopharyngeal Swab Updated: 10/10/18 0323    INFLU A PCR None Detected    INFLU B PCR None Detected    RSV PCR None Detected   MRSA culture [[id number]] (Abnormal) Collected: 10/08/18 0123   Lab Status: Final result Specimen: Nares from Nose Updated: 10/11/18 0804    MRSA Culture Only Methicillin Resistant Staphylococcus aureus isolated (A)     Please note: This patient requires contact precautions  Blood culture #1 [[id number]] Collected: 10/07/18 1613   Lab Status: Final result Specimen: Blood from Arm, Left Updated: 10/12/18 2001    Blood Culture No Growth After 5 Days  Blood culture #2 [[id number]] Collected: 10/07/18 1552   Lab Status: Final result Specimen: Blood from Hand, Left Updated: 10/12/18 2001    Blood Culture No Growth After 5 Days  Right atrial enlargement   Assessment & Plan    · Outpatient follow-up with Cardiology     Hypoalbuminemia   Assessment & Plan    · Increase tube feedings per the dietitian's recommendations to the following:  Jevity 1 2 shaneka continuous at 50 ml with one pack of prosource protein daily and free water flushes of 150 ml every 4 hours     Elevated MCV   Assessment & Plan    Results for [facility] (MRN [id number]) as of 10/12/2018 12:28   Ref  Range 10/11/2018 04:39   Folate Latest Ref Range: 3 1 - 17 5 ng/mL >20 0 (H)   Vitamin B-12 Latest Ref Range: 100 - 900 pg/mL 1,944 (H)        MRSA colonization   Assessment & Plan    · The patient will be maintained on contact precautions    She was placed on nasal bactroban/mupirocin 2% ointment applied to both nares BID for 5 days  The patient will also need MRSA decolonization at [facility] including chlorhexidine/hibiclens rinse applied from the neck down to the toes daily with bathing/showering for 2 weeks  She will need a MRSA nasal screen rechecked in 2 weeks after decolonization  Skin breakdown   Assessment & Plan    · Wound care consult as follows:  Assessment:   Skin intact  Slight pink discoloration at PEG tub site  Tyson 12-high risk for pressure injury     Plan:   1  Cavilon No Sting skin prep with PEG site care  2   Avoid brief  3   Accumax mattress with air pump  4  Wound consult PRN     Transaminitis   Assessment & Plan    · Possibly medication-related versus secondary to sepsis  · Avoid all hepatotoxic agents  · Follow the liver function tests after discharge  · An acute hepatitis panel was completed with the following results:  Results for Vikash East (MRN [id number]) as of 10/11/2018 14:01   Ref   Range 10/10/2018 05:59   HEPATITIS A IGM ANTIBODY Latest Ref Range: Non-reactive, Equivocal-Suggest Recollect  Non-reactive   HEPATITIS B SURFACE ANTIGEN Latest Ref Range: Non-reactive, NonReactive - Confirmed  Non-reactive   HEPATITIS B CORE IGM ANTIBODY Latest Ref Range: Non-reactive  Non-reactive   HEPATITIS C ANTIBODY Latest Ref Range: Non-reactive  Non-reactive        Lactic acidosis   Assessment & Plan    · Resolved during the hospitalization     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with tonic seizures (HCC)   Assessment & Plan    · Seizure precautions at all times  · Continue to monitor for any seizure activity at [facility]  · Continue her current seizure medication regimen  · Outpatient follow-up with Neurology           Discharging Physician / Practitioner: Kamryn Ventura DO  PCP: Brittany Farr DO  Admission Date: 10/7/18  Discharge Date: 10/13/18      Consultations During Hospital Stay:  · None    Procedures Performed:   · None    Significant Findings / Test Results:   Xr Chest Portable    Result Date: 10/8/2018  Impression: No acute cardiopulmonary disease  Workstation performed: [id number]     Xr Chest Portable - 1 View    Result Date: 10/8/2018  Impression: No acute cardiopulmonary disease  Workstation performed: [id number]     Incidental Findings:   · None     Test Results Pending at Discharge (will require follow up): · None     Outpatient Tests Requested:  · CBC with diff , CMP, Mag, Phos, Procalcitonin level in 2 days and in 1 week    Complications:  None    Reason for Admission:  Probable sepsis    Hospital Course:     Ирина Turk is a 41 y o  female patient who originally presented to the hospital on 10/7/2018 due to vomiting with probable aspiration  Please see above list of diagnoses and related plan for additional information  Condition at Discharge: stable     Discharge Day Visit / Exam:     Subjective: The patient was seen and examined  The patient had one episode of vomiting in the evening on 10/12/2018  Her nausea are her vomiting has now resolved  Vitals: Blood Pressure: 108/67 (10/13/18 0803)  Pulse: 89 (10/13/18 0803)  Temperature: 98 °F (36 7 °C) (10/13/18 0803)  Temp Source: Temporal (10/13/18 0803)  Respirations: 18 (10/13/18 0803)  Height: 5' (152 4 cm) (10/07/18 2045)  Weight - Scale: 53 1 kg (117 lb 1 oz) (10/13/18 0600)  SpO2: 97 % (10/13/18 0803)  Exam:   Physical Exam  General:  NAD, non-verbal, responds to verbal and tactile stimuli  HEENT:  NC/AT, mucous membranes dry  Neck:  Supple, No JVP elevation  CV:  + S1, + S2, RRR  Pulm:  Scant bibasilar crackles  Abd:  Soft, Non-tender, Non-distended, PEG tube in place  Ext:  No clubbing/cyanosis/edema  Skin:  No rashes      Discharge instructions/Information to patient and family:   See after visit summary for information provided to patient and family  Provisions for Follow-Up Care:  See after visit summary for information related to follow-up care and any pertinent home health orders  Disposition:     [facility]ine Byrd at [facility]      Planned Readmission:  No     Discharge Statement:  I spent 35 minutes discharging the patient  This time was spent on the day of discharge  I had direct contact with the patient on the day of discharge  Greater than 50% of the total time was spent examining patient, answering all patient questions, arranging and discussing plan of care with patient as well as directly providing post-discharge instructions  Additional time then spent on discharge activities  Discharge Medications:  See after visit summary for reconciled discharge medications provided to patient and family        ** Please Note: This note has been constructed using a voice recognition system **

## 2018-10-13 NOTE — NURSING NOTE
Large amount approximate 500ml partially digested tube feeding expelled as vomitus by patient  Tubefeeding placed on hold and call made to PA as patient does not have zofran ordered  Order obtained

## 2018-10-13 NOTE — ASSESSMENT & PLAN NOTE
· Probable sepsis secondary to probable aspiration pneumonia/aspiration pneumonitis  · The sepsis resolved by the time of discharge  · She was treated with a course of IV vancomycin and IV cefepime during hospitalization    Procedure Component Value - Date/Time   Urine culture [[id number]] Collected: 10/10/18 1838   Lab Status: Final result Specimen: Urine from Urine, Clean Catch Updated: 10/12/18 0710    Urine Culture No Growth <1000 cfu/mL   Influenza A/B and RSV by PCR (Indicated for patients > 2 mo of age) [[id number]] (Normal) Collected: 10/09/18 1336   Lab Status: Final result Specimen: Nasopharyngeal from Nasopharyngeal Swab Updated: 10/10/18 0323    INFLU A PCR None Detected    INFLU B PCR None Detected    RSV PCR None Detected   MRSA culture [[id number]] (Abnormal) Collected: 10/08/18 0123   Lab Status: Final result Specimen: Nares from Nose Updated: 10/11/18 0804    MRSA Culture Only Methicillin Resistant Staphylococcus aureus isolated (A)     Please note: This patient requires contact precautions  Blood culture #1 [[id number]] Collected: 10/07/18 1613   Lab Status: Final result Specimen: Blood from Arm, Left Updated: 10/12/18 2001    Blood Culture No Growth After 5 Days  Blood culture #2 [[id number]] Collected: 10/07/18 1552   Lab Status: Final result Specimen: Blood from Hand, Left Updated: 10/12/18 2001    Blood Culture No Growth After 5 Days

## 2018-10-13 NOTE — ASSESSMENT & PLAN NOTE
· Possibly medication-related versus secondary to sepsis  · Avoid all hepatotoxic agents  · Follow the liver function tests after discharge  · An acute hepatitis panel was completed with the following results:  Results for Tobias Leigh (MRN [id number]) as of 10/11/2018 14:01   Ref   Range 10/10/2018 05:59   HEPATITIS A IGM ANTIBODY Latest Ref Range: Non-reactive, Equivocal-Suggest Recollect  Non-reactive   HEPATITIS B SURFACE ANTIGEN Latest Ref Range: Non-reactive, NonReactive - Confirmed  Non-reactive   HEPATITIS B CORE IGM ANTIBODY Latest Ref Range: Non-reactive  Non-reactive   HEPATITIS C ANTIBODY Latest Ref Range: Non-reactive  Non-reactive

## 2018-10-13 NOTE — ASSESSMENT & PLAN NOTE
· Probable sepsis secondary to probable aspiration pneumonia/aspiration pneumonitis  · The sepsis resolved by the time of discharge  · The lactic acidosis resolved during the hospitalization  · She was treated with a course of IV vancomycin and IV cefepime during hospitalization    Procedure Component Value - Date/Time   Urine culture [[id number]] Collected: 10/10/18 1838   Lab Status: Final result Specimen: Urine from Urine, Clean Catch Updated: 10/12/18 0710    Urine Culture No Growth <1000 cfu/mL   Influenza A/B and RSV by PCR (Indicated for patients > 2 mo of age) [[id number]] (Normal) Collected: 10/09/18 1336   Lab Status: Final result Specimen: Nasopharyngeal from Nasopharyngeal Swab Updated: 10/10/18 0323    INFLU A PCR None Detected    INFLU B PCR None Detected    RSV PCR None Detected   MRSA culture [[id number]] (Abnormal) Collected: 10/08/18 0123   Lab Status: Final result Specimen: Nares from Nose Updated: 10/11/18 0804    MRSA Culture Only Methicillin Resistant Staphylococcus aureus isolated (A)     Please note: This patient requires contact precautions  Blood culture #1 [[id number]] Collected: 10/07/18 1613   Lab Status: Final result Specimen: Blood from Arm, Left Updated: 10/12/18 2001    Blood Culture No Growth After 5 Days  Blood culture #2 [[id number]] Collected: 10/07/18 1552   Lab Status: Final result Specimen: Blood from Hand, Left Updated: 10/12/18 2001    Blood Culture No Growth After 5 Days

## 2018-10-13 NOTE — ASSESSMENT & PLAN NOTE
· Increase tube feedings per the dietitian's recommendations to the following:  Jevity 1 2 shaneka continuous at 50 ml with one pack of prosource protein daily and free water flushes of 150 ml every 4 hours

## 2018-10-13 NOTE — ASSESSMENT & PLAN NOTE
· Wound care consult as follows:  Assessment:   Skin intact  Slight pink discoloration at PEG tub site  Tyson 12-high risk for pressure injury     Plan:   1  Cavilon No Sting skin prep with PEG site care  2   Avoid brief  3   Accumax mattress with air pump  4    Wound consult PRN

## 2018-10-13 NOTE — CONSULTS
The patient's Vancomycin therapy has been completed / discontinued  Thank you for this consult; Pharmacy will sign-off now

## 2018-10-13 NOTE — PLAN OF CARE
Problem: Potential for Falls  Goal: Patient will remain free of falls  INTERVENTIONS:  - Assess patient frequently for physical needs  -  Identify cognitive and physical deficits and behaviors that affect risk of falls  Patient is non-verbal  -  [place] fall precautions as indicated by assessment  High fall risk   - Educate patient/family on patient safety including physical limitations  - Instruct patient to call for assistance with activity based on assessment, patient unable to call for assistance, frequent bed checks for needs necessary  - Modify environment to reduce risk of injury  - Consider OT/PT consult to assist with strengthening/mobility    Outcome: Adequate for Discharge      Problem: Prexisting or High Potential for Compromised Skin Integrity  Goal: Skin integrity is maintained or improved  INTERVENTIONS:  - Identify patients at risk for skin breakdown  - Assess and monitor skin integrity  - Assess and monitor nutrition and hydration status  - Monitor labs (i e  albumin)  - Assess for incontinence Change frequently to minimize moisture contact with skin  - Turn and reposition patient every 2 hours and prn  - Relieve pressure over bony prominences  - Avoid friction and shearing  - Provide appropriate hygiene as needed including keeping skin clean and dry  - Evaluate need for skin moisturizer/barrier cream  - Collaborate with interdisciplinary team (i e  Nutrition, Rehabilitation, etc )   - Patient/family teaching    Outcome: Adequate for Discharge      Problem: Nutrition/Hydration-ADULT  Goal: Nutrient/Hydration intake appropriate for improving, restoring or maintaining nutritional needs  Monitor and assess patient's nutrition/hydration status for malnutrition (ex- brittle hair, bruises, dry skin, pale skin and conjunctiva, muscle wasting, smooth red tongue, and disorientation)  Collaborate with interdisciplinary team and initiate plan and interventions as ordered    Monitor patient's weight and dietary intake as ordered or per policy  Utilize nutrition screening tool and intervene per policy      INTERVENTIONS:  - Monitor urinary output, labs, and treatment plans  - Assess nutrition and hydration status and recommend course of action  - Recommend/ encourage appropriate diets, nutritional supplements, and vitamin/mineral supplements  - Order, calculate, and assess calorie counts as needed  - Recommend, monitor, and adjust tube feedings based on assessed needs  - Assess need for intravenous fluids  - Provide specific nutrition/hydration education as appropriate  - Include patient/family/caregiver in decisions related to nutrition    Outcome: Adequate for Discharge      Problem: PAIN - ADULT  Goal: Verbalizes/displays adequate comfort level or baseline comfort level  Interventions:  - Encourage patient to monitor pain and request assistance  - Assess pain using appropriate pain scale, FLACC pain scale  - Administer analgesics based on type and severity of pain and evaluate response  - Implement non-pharmacological measures as appropriate and evaluate response  - Consider cultural and social influences on pain and pain management  - Notify physician/advanced practitioner if interventions unsuccessful or patient reports new pain    Outcome: Adequate for Discharge      Problem: INFECTION - ADULT  Goal: Absence or prevention of progression during hospitalization  INTERVENTIONS:  - Assess and monitor for signs and symptoms of infection  - Monitor lab/diagnostic results  - Monitor all insertion sites, i e  indwelling lines, tubes, and drains  - [place] appropriate cooling/warming therapies per order  - Administer medications as ordered  - Instruct and encourage patient and family to use good hand hygiene technique  - Identify and instruct in appropriate isolation precautions for identified infection/condition Contact precautions   Outcome: Adequate for Discharge      Problem: SAFETY ADULT  Goal: Maintain or return to baseline ADL function  INTERVENTIONS:  -  Assess patient's ability to carry out ADLs; assess patient's baseline for ADL function and identify physical deficits which impact ability to perform ADLs Dependent for all needs and ADLs  - Assess range of motion, Patient has contractures of extremities  - Assess patient's mobility; Patient is bedbound  - Assess patient's need for assistive devices and provide as appropriate  ¯ Involve family in performance of ADLs  ¯ Assess for home care needs following discharge   ¯ Request OT consult to assist with ADL evaluation and planning for discharge  ¯ Provide patient education as appropriate    Outcome: Adequate for Discharge    Goal: Maintain or return mobility status to optimal level  INTERVENTIONS:  - Assess patient's baseline mobility status Baseline: Bed bound  - Identify cognitive and physical deficits and behaviors that affect mobility  - Identify mobility aids required to assist with transfers and/or ambulation Sling/lift  - [place] fall precautions as indicated by assessment   High fall risk   - Record patient progress and toleration of activity level on Mobility SBAR; progress patient to next Phase/Stage  - Request Rehabilitation consult to assist with strengthening/weightbearing, etc     Outcome: Adequate for Discharge      Problem: DISCHARGE PLANNING  Goal: Discharge to home or other facility with appropriate resources  INTERVENTIONS:  - Identify barriers to discharge w/patient and caregiver  - Arrange for needed discharge resources and transportation as appropriate  - Identify discharge learning needs (meds, wound care, etc )  - Arrange for interpretive services to assist at discharge as needed  - Refer to Case Management Department for coordinating discharge planning if the patient needs post-hospital services based on physician/advanced practitioner order or complex needs related to functional status, cognitive ability, or social support system   Outcome: Adequate for Discharge      Problem: Knowledge Deficit  Goal: Patient/family/caregiver demonstrates understanding of disease process, treatment plan, medications, and discharge instructions  Complete learning assessment and assess knowledge base    Interventions:  - Provide teaching at level of understanding  - Provide teaching via preferred learning methods   Outcome: Adequate for Discharge      Problem: DISCHARGE PLANNING - CARE MANAGEMENT  Goal: Discharge to post-acute care or home with appropriate resources  INTERVENTIONS:  - Conduct assessment to determine patient/family and health care team treatment goals, and need for post-acute services based on payer coverage, community resources, and patient preferences, and barriers to discharge  - Address psychosocial, clinical, and financial barriers to discharge as identified in assessment in conjunction with the patient/family and health care team  - Arrange appropriate level of post-acute services according to patient's   needs and preference and payer coverage in collaboration with the physician and health care team  - Communicate with and update the patient/family, physician, and health care team regarding progress on the discharge plan  - Arrange appropriate transportation to post-acute venues   Outcome: Adequate for Discharge

## 2018-10-13 NOTE — SOCIAL WORK
The patient is for D/c today via med stat stretcher and they are on their way to transport the patient  time 1 pm   I called the patients mother and I left a message and I called the cell and thee patients mother's  answered the phone and he told me he will relay the message the patients father is also aware of this

## 2018-10-13 NOTE — ASSESSMENT & PLAN NOTE
· The patient will be maintained on contact precautions  She was placed on nasal bactroban/mupirocin 2% ointment applied to both nares BID for 5 days  The patient will also need MRSA decolonization at [facility] including chlorhexidine/hibiclens rinse applied from the neck down to the toes daily with bathing/showering for 2 weeks  She will need a MRSA nasal screen rechecked in 2 weeks after decolonization

## 2018-10-13 NOTE — PLAN OF CARE

## 2018-10-13 NOTE — NURSING NOTE
Patient transported via [facility] to [facility]  Report called to Waleska Franco at [facility]  Patient in no acute distress at time of d/c

## 2018-10-13 NOTE — ASSESSMENT & PLAN NOTE
· Seizure precautions at all times  · Continue to monitor for any seizure activity at [facility]  · Continue her current seizure medication regimen  · Outpatient follow-up with Neurology

## 2018-10-14 LAB — LEVETIRACETAM SERPL-MCNC: 17.3 UG/ML

## 2018-10-15 LAB — VALPROATE FREE SERPL-MCNC: 6.6 UG/ML (ref 6–22)

## 2018-12-06 ENCOUNTER — PATIENT OUTREACH (OUTPATIENT)
Dept: OTHER | Facility: HOSPITAL | Age: 38
End: 2018-12-06

## 2018-12-14 ENCOUNTER — APPOINTMENT (EMERGENCY)
Dept: RADIOLOGY | Facility: HOSPITAL | Age: 38
DRG: 871 | End: 2018-12-14
Payer: MEDICARE

## 2018-12-14 ENCOUNTER — HOSPITAL ENCOUNTER (INPATIENT)
Facility: HOSPITAL | Age: 38
LOS: 5 days | DRG: 871 | End: 2018-12-19
Attending: EMERGENCY MEDICINE | Admitting: FAMILY MEDICINE
Payer: MEDICARE

## 2018-12-14 DIAGNOSIS — R53.83 LETHARGY: ICD-10-CM

## 2018-12-14 DIAGNOSIS — R09.02 HYPOXIA: ICD-10-CM

## 2018-12-14 DIAGNOSIS — R50.9 FEVER: Primary | ICD-10-CM

## 2018-12-14 DIAGNOSIS — R06.03 RESPIRATORY DISTRESS: ICD-10-CM

## 2018-12-14 DIAGNOSIS — R65.20 SEVERE SEPSIS (HCC): ICD-10-CM

## 2018-12-14 DIAGNOSIS — R63.6 UNDERWEIGHT: ICD-10-CM

## 2018-12-14 DIAGNOSIS — A41.9 SEPSIS (HCC): ICD-10-CM

## 2018-12-14 DIAGNOSIS — G40.909 SEIZURE DISORDER (HCC): ICD-10-CM

## 2018-12-14 DIAGNOSIS — R11.10 VOMITING: ICD-10-CM

## 2018-12-14 DIAGNOSIS — A41.9 SEVERE SEPSIS (HCC): ICD-10-CM

## 2018-12-14 DIAGNOSIS — G40.812 LENNOX-GASTAUT SYNDROME WITH TONIC SEIZURES (HCC): ICD-10-CM

## 2018-12-14 DIAGNOSIS — J90 PLEURAL EFFUSION: ICD-10-CM

## 2018-12-14 DIAGNOSIS — R50.81 FEVER IN OTHER DISEASES: ICD-10-CM

## 2018-12-14 DIAGNOSIS — I21.3 STEMI (ST ELEVATION MYOCARDIAL INFARCTION) (HCC): ICD-10-CM

## 2018-12-14 DIAGNOSIS — J18.9 PNEUMONIA: ICD-10-CM

## 2018-12-14 DIAGNOSIS — Z86.69 HX OF SEIZURE DISORDER: ICD-10-CM

## 2018-12-14 PROBLEM — D53.9 MACROCYTIC ANEMIA: Status: RESOLVED | Noted: 2017-11-24 | Resolved: 2018-12-14

## 2018-12-14 PROBLEM — K94.23 GASTROSTOMY TUBE OBSTRUCTION (HCC): Status: RESOLVED | Noted: 2017-07-14 | Resolved: 2018-12-14

## 2018-12-14 PROBLEM — R13.10 DYSPHAGIA: Status: RESOLVED | Noted: 2017-02-22 | Resolved: 2018-12-14

## 2018-12-14 PROBLEM — R71.8 ELEVATED MCV: Status: RESOLVED | Noted: 2018-10-10 | Resolved: 2018-12-14

## 2018-12-14 PROBLEM — G47.19 EXCESSIVE DAYTIME SLEEPINESS: Status: RESOLVED | Noted: 2017-11-22 | Resolved: 2018-12-14

## 2018-12-14 PROBLEM — K76.82 ACUTE HEPATIC ENCEPHALOPATHY: Status: RESOLVED | Noted: 2018-08-18 | Resolved: 2018-12-14

## 2018-12-14 PROBLEM — H65.199 ACUTE MEE (MIDDLE EAR EFFUSION): Status: RESOLVED | Noted: 2017-11-23 | Resolved: 2018-12-14

## 2018-12-14 PROBLEM — J96.01 ACUTE RESPIRATORY FAILURE WITH HYPOXIA (HCC): Status: ACTIVE | Noted: 2017-12-02

## 2018-12-14 PROBLEM — G93.40 ENCEPHALOPATHY: Status: RESOLVED | Noted: 2018-10-07 | Resolved: 2018-12-14

## 2018-12-14 PROBLEM — K72.00 ACUTE HEPATIC ENCEPHALOPATHY: Status: RESOLVED | Noted: 2018-08-18 | Resolved: 2018-12-14

## 2018-12-14 PROBLEM — J96.02 ACUTE RESPIRATORY FAILURE WITH HYPERCAPNIA (HCC): Status: RESOLVED | Noted: 2017-12-02 | Resolved: 2018-12-14

## 2018-12-14 PROBLEM — E87.0 HYPERNATREMIA: Status: RESOLVED | Noted: 2017-12-06 | Resolved: 2018-12-14

## 2018-12-14 LAB
ALBUMIN SERPL BCP-MCNC: 2.1 G/DL (ref 3.5–5)
ALP SERPL-CCNC: 125 U/L (ref 46–116)
ALT SERPL W P-5'-P-CCNC: 94 U/L (ref 12–78)
AMMONIA PLAS-SCNC: 37 UMOL/L (ref 11–35)
ANION GAP SERPL CALCULATED.3IONS-SCNC: 9 MMOL/L (ref 4–13)
APTT PPP: 34 SECONDS (ref 26–38)
AST SERPL W P-5'-P-CCNC: 91 U/L (ref 5–45)
BACTERIA UR QL AUTO: ABNORMAL /HPF
BASE EXCESS BLDA CALC-SCNC: -7 MMOL/L (ref -2–3)
BASE EXCESS BLDA CALC-SCNC: 2.1 MMOL/L
BASOPHILS # BLD AUTO: 0.07 THOUSANDS/ΜL (ref 0–0.1)
BASOPHILS # BLD AUTO: 0.07 THOUSANDS/ΜL (ref 0–0.1)
BASOPHILS NFR BLD AUTO: 0 % (ref 0–1)
BASOPHILS NFR BLD AUTO: 0 % (ref 0–1)
BILIRUB SERPL-MCNC: 0.6 MG/DL (ref 0.2–1)
BILIRUB UR QL STRIP: NEGATIVE
BUN SERPL-MCNC: 12 MG/DL (ref 5–25)
CA-I BLD-SCNC: 1.11 MMOL/L (ref 1.12–1.32)
CALCIUM SERPL-MCNC: 8.1 MG/DL (ref 8.3–10.1)
CHLORIDE SERPL-SCNC: 105 MMOL/L (ref 100–108)
CLARITY UR: CLEAR
CO2 SERPL-SCNC: 27 MMOL/L (ref 21–32)
COLOR UR: YELLOW
CREAT SERPL-MCNC: 0.47 MG/DL (ref 0.6–1.3)
EOSINOPHIL # BLD AUTO: 0 THOUSAND/ΜL (ref 0–0.61)
EOSINOPHIL # BLD AUTO: 0 THOUSAND/ΜL (ref 0–0.61)
EOSINOPHIL NFR BLD AUTO: 0 % (ref 0–6)
EOSINOPHIL NFR BLD AUTO: 0 % (ref 0–6)
ERYTHROCYTE [DISTWIDTH] IN BLOOD BY AUTOMATED COUNT: 13.7 % (ref 11.6–15.1)
ERYTHROCYTE [DISTWIDTH] IN BLOOD BY AUTOMATED COUNT: 14 % (ref 11.6–15.1)
FLUAV AG SPEC QL IA: NEGATIVE
FLUAV AG SPEC QL: NORMAL
FLUBV AG SPEC QL IA: NEGATIVE
FLUBV AG SPEC QL: NORMAL
GFR SERPL CREATININE-BSD FRML MDRD: 127 ML/MIN/1.73SQ M
GLUCOSE SERPL-MCNC: 123 MG/DL (ref 65–140)
GLUCOSE SERPL-MCNC: 131 MG/DL (ref 65–140)
GLUCOSE UR STRIP-MCNC: NEGATIVE MG/DL
HCO3 BLDA-SCNC: 17.8 MMOL/L (ref 22–28)
HCO3 BLDA-SCNC: 27 MMOL/L (ref 22–28)
HCT VFR BLD AUTO: 34.1 % (ref 34.8–46.1)
HCT VFR BLD AUTO: 41.6 % (ref 34.8–46.1)
HCT VFR BLD CALC: 35 % (ref 34.8–46.1)
HGB BLD-MCNC: 11.1 G/DL (ref 11.5–15.4)
HGB BLD-MCNC: 13.3 G/DL (ref 11.5–15.4)
HGB BLDA-MCNC: 11.9 G/DL (ref 11.5–15.4)
HGB UR QL STRIP.AUTO: ABNORMAL
HOLD SPECIMEN: NORMAL
IMM GRANULOCYTES # BLD AUTO: 0.14 THOUSAND/UL (ref 0–0.2)
IMM GRANULOCYTES # BLD AUTO: 0.15 THOUSAND/UL (ref 0–0.2)
IMM GRANULOCYTES NFR BLD AUTO: 1 % (ref 0–2)
IMM GRANULOCYTES NFR BLD AUTO: 1 % (ref 0–2)
INR PPP: 1.37 (ref 0.86–1.17)
KETONES UR STRIP-MCNC: NEGATIVE MG/DL
LACTATE SERPL-SCNC: 3.2 MMOL/L (ref 0.5–2)
LACTATE SERPL-SCNC: 3.4 MMOL/L (ref 0.5–2)
LACTATE SERPL-SCNC: 5.2 MMOL/L (ref 0.5–2)
LEUKOCYTE ESTERASE UR QL STRIP: NEGATIVE
LYMPHOCYTES # BLD AUTO: 1.44 THOUSANDS/ΜL (ref 0.6–4.47)
LYMPHOCYTES # BLD AUTO: 2.88 THOUSANDS/ΜL (ref 0.6–4.47)
LYMPHOCYTES NFR BLD AUTO: 11 % (ref 14–44)
LYMPHOCYTES NFR BLD AUTO: 6 % (ref 14–44)
MAGNESIUM SERPL-MCNC: 1.8 MG/DL (ref 1.6–2.6)
MCH RBC QN AUTO: 33.7 PG (ref 26.8–34.3)
MCH RBC QN AUTO: 34.7 PG (ref 26.8–34.3)
MCHC RBC AUTO-ENTMCNC: 32 G/DL (ref 31.4–37.4)
MCHC RBC AUTO-ENTMCNC: 32.6 G/DL (ref 31.4–37.4)
MCV RBC AUTO: 105 FL (ref 82–98)
MCV RBC AUTO: 107 FL (ref 82–98)
MONOCYTES # BLD AUTO: 2.86 THOUSAND/ΜL (ref 0.17–1.22)
MONOCYTES # BLD AUTO: 3.14 THOUSAND/ΜL (ref 0.17–1.22)
MONOCYTES NFR BLD AUTO: 11 % (ref 4–12)
MONOCYTES NFR BLD AUTO: 12 % (ref 4–12)
NEUTROPHILS # BLD AUTO: 20.66 THOUSANDS/ΜL (ref 1.85–7.62)
NEUTROPHILS # BLD AUTO: 21.6 THOUSANDS/ΜL (ref 1.85–7.62)
NEUTS SEG NFR BLD AUTO: 76 % (ref 43–75)
NEUTS SEG NFR BLD AUTO: 82 % (ref 43–75)
NITRITE UR QL STRIP: NEGATIVE
NON VENT TYPE- NRB: 100
NON-SQ EPI CELLS URNS QL MICRO: ABNORMAL /HPF
NRBC BLD AUTO-RTO: 0 /100 WBCS
NRBC BLD AUTO-RTO: 0 /100 WBCS
NT-PROBNP SERPL-MCNC: 468 PG/ML
O2 CT BLDA-SCNC: 19.5 ML/DL (ref 16–23)
OXYHGB MFR BLDA: 95 % (ref 94–97)
PCO2 BLD: 19 MMOL/L (ref 21–32)
PCO2 BLD: 34.1 MM HG (ref 36–44)
PCO2 BLDA: 42.7 MM HG (ref 36–44)
PH BLD: 7.33 [PH] (ref 7.35–7.45)
PH BLDA: 7.42 [PH] (ref 7.35–7.45)
PH UR STRIP.AUTO: 5.5 [PH] (ref 4.5–8)
PLATELET # BLD AUTO: 222 THOUSANDS/UL (ref 149–390)
PLATELET # BLD AUTO: 268 THOUSANDS/UL (ref 149–390)
PMV BLD AUTO: 10.4 FL (ref 8.9–12.7)
PMV BLD AUTO: 10.6 FL (ref 8.9–12.7)
PO2 BLD: 112 MM HG (ref 75–129)
PO2 BLDA: 79.3 MM HG (ref 75–129)
POTASSIUM BLD-SCNC: 3.1 MMOL/L (ref 3.5–5.3)
POTASSIUM SERPL-SCNC: 3.7 MMOL/L (ref 3.5–5.3)
PROCALCITONIN SERPL-MCNC: 3.29 NG/ML
PROT SERPL-MCNC: 5.9 G/DL (ref 6.4–8.2)
PROT UR STRIP-MCNC: NEGATIVE MG/DL
PROTHROMBIN TIME: 16.2 SECONDS (ref 11.8–14.2)
RBC # BLD AUTO: 3.2 MILLION/UL (ref 3.81–5.12)
RBC # BLD AUTO: 3.95 MILLION/UL (ref 3.81–5.12)
RBC #/AREA URNS AUTO: ABNORMAL /HPF
RSV B RNA SPEC QL NAA+PROBE: NORMAL
SAO2 % BLD FROM PO2: 98 % (ref 95–98)
SODIUM BLD-SCNC: 142 MMOL/L (ref 136–145)
SODIUM SERPL-SCNC: 141 MMOL/L (ref 136–145)
SP GR UR STRIP.AUTO: 1.02 (ref 1–1.03)
SPECIMEN SOURCE: ABNORMAL
SPECIMEN SOURCE: NORMAL
UROBILINOGEN UR QL STRIP.AUTO: 0.2 E.U./DL
WBC # BLD AUTO: 26.11 THOUSAND/UL (ref 4.31–10.16)
WBC # BLD AUTO: 26.9 THOUSAND/UL (ref 4.31–10.16)
WBC #/AREA URNS AUTO: ABNORMAL /HPF

## 2018-12-14 PROCEDURE — 87040 BLOOD CULTURE FOR BACTERIA: CPT | Performed by: EMERGENCY MEDICINE

## 2018-12-14 PROCEDURE — 81001 URINALYSIS AUTO W/SCOPE: CPT | Performed by: EMERGENCY MEDICINE

## 2018-12-14 PROCEDURE — 94640 AIRWAY INHALATION TREATMENT: CPT

## 2018-12-14 PROCEDURE — 82805 BLOOD GASES W/O2 SATURATION: CPT | Performed by: EMERGENCY MEDICINE

## 2018-12-14 PROCEDURE — 82803 BLOOD GASES ANY COMBINATION: CPT

## 2018-12-14 PROCEDURE — 84295 ASSAY OF SERUM SODIUM: CPT

## 2018-12-14 PROCEDURE — 82947 ASSAY GLUCOSE BLOOD QUANT: CPT

## 2018-12-14 PROCEDURE — 99285 EMERGENCY DEPT VISIT HI MDM: CPT

## 2018-12-14 PROCEDURE — 84145 PROCALCITONIN (PCT): CPT | Performed by: PHYSICIAN ASSISTANT

## 2018-12-14 PROCEDURE — 83605 ASSAY OF LACTIC ACID: CPT | Performed by: PHYSICIAN ASSISTANT

## 2018-12-14 PROCEDURE — 99291 CRITICAL CARE FIRST HOUR: CPT | Performed by: INTERNAL MEDICINE

## 2018-12-14 PROCEDURE — 36415 COLL VENOUS BLD VENIPUNCTURE: CPT | Performed by: EMERGENCY MEDICINE

## 2018-12-14 PROCEDURE — 84145 PROCALCITONIN (PCT): CPT | Performed by: INTERNAL MEDICINE

## 2018-12-14 PROCEDURE — 85730 THROMBOPLASTIN TIME PARTIAL: CPT | Performed by: EMERGENCY MEDICINE

## 2018-12-14 PROCEDURE — 94660 CPAP INITIATION&MGMT: CPT

## 2018-12-14 PROCEDURE — 80053 COMPREHEN METABOLIC PANEL: CPT | Performed by: EMERGENCY MEDICINE

## 2018-12-14 PROCEDURE — 83605 ASSAY OF LACTIC ACID: CPT | Performed by: FAMILY MEDICINE

## 2018-12-14 PROCEDURE — 83880 ASSAY OF NATRIURETIC PEPTIDE: CPT | Performed by: EMERGENCY MEDICINE

## 2018-12-14 PROCEDURE — 80053 COMPREHEN METABOLIC PANEL: CPT | Performed by: PHYSICIAN ASSISTANT

## 2018-12-14 PROCEDURE — 71045 X-RAY EXAM CHEST 1 VIEW: CPT

## 2018-12-14 PROCEDURE — 84132 ASSAY OF SERUM POTASSIUM: CPT

## 2018-12-14 PROCEDURE — 36600 WITHDRAWAL OF ARTERIAL BLOOD: CPT

## 2018-12-14 PROCEDURE — 96361 HYDRATE IV INFUSION ADD-ON: CPT

## 2018-12-14 PROCEDURE — 85014 HEMATOCRIT: CPT

## 2018-12-14 PROCEDURE — 82140 ASSAY OF AMMONIA: CPT | Performed by: EMERGENCY MEDICINE

## 2018-12-14 PROCEDURE — 83605 ASSAY OF LACTIC ACID: CPT | Performed by: INTERNAL MEDICINE

## 2018-12-14 PROCEDURE — 85025 COMPLETE CBC W/AUTO DIFF WBC: CPT | Performed by: EMERGENCY MEDICINE

## 2018-12-14 PROCEDURE — 85610 PROTHROMBIN TIME: CPT | Performed by: EMERGENCY MEDICINE

## 2018-12-14 PROCEDURE — 96374 THER/PROPH/DIAG INJ IV PUSH: CPT

## 2018-12-14 PROCEDURE — 85025 COMPLETE CBC W/AUTO DIFF WBC: CPT | Performed by: PHYSICIAN ASSISTANT

## 2018-12-14 PROCEDURE — 87631 RESP VIRUS 3-5 TARGETS: CPT | Performed by: EMERGENCY MEDICINE

## 2018-12-14 PROCEDURE — 83735 ASSAY OF MAGNESIUM: CPT | Performed by: EMERGENCY MEDICINE

## 2018-12-14 PROCEDURE — 83605 ASSAY OF LACTIC ACID: CPT | Performed by: EMERGENCY MEDICINE

## 2018-12-14 PROCEDURE — 94760 N-INVAS EAR/PLS OXIMETRY 1: CPT

## 2018-12-14 PROCEDURE — 82330 ASSAY OF CALCIUM: CPT

## 2018-12-14 RX ORDER — SODIUM CHLORIDE 9 MG/ML
50 INJECTION, SOLUTION INTRAVENOUS CONTINUOUS
Status: DISCONTINUED | OUTPATIENT
Start: 2018-12-14 | End: 2018-12-17

## 2018-12-14 RX ORDER — LACTULOSE 20 G/30ML
30 SOLUTION ORAL 3 TIMES DAILY
Status: DISCONTINUED | OUTPATIENT
Start: 2018-12-14 | End: 2018-12-18

## 2018-12-14 RX ORDER — ONDANSETRON HYDROCHLORIDE 4 MG/5ML
4 SOLUTION ORAL EVERY 4 HOURS PRN
Status: DISCONTINUED | OUTPATIENT
Start: 2018-12-14 | End: 2018-12-14

## 2018-12-14 RX ORDER — ACETAMINOPHEN 650 MG/1
650 SUPPOSITORY RECTAL ONCE
Status: COMPLETED | OUTPATIENT
Start: 2018-12-14 | End: 2018-12-14

## 2018-12-14 RX ORDER — ONDANSETRON 2 MG/ML
4 INJECTION INTRAMUSCULAR; INTRAVENOUS EVERY 4 HOURS PRN
Status: DISCONTINUED | OUTPATIENT
Start: 2018-12-14 | End: 2018-12-19 | Stop reason: HOSPADM

## 2018-12-14 RX ORDER — CEFEPIME HYDROCHLORIDE 2 G/50ML
2000 INJECTION, SOLUTION INTRAVENOUS ONCE
Status: COMPLETED | OUTPATIENT
Start: 2018-12-14 | End: 2018-12-14

## 2018-12-14 RX ORDER — IPRATROPIUM BROMIDE AND ALBUTEROL SULFATE 2.5; .5 MG/3ML; MG/3ML
3 SOLUTION RESPIRATORY (INHALATION) ONCE
Status: COMPLETED | OUTPATIENT
Start: 2018-12-14 | End: 2018-12-14

## 2018-12-14 RX ORDER — MULTIVIT WITH IRON,MINERALS
5 LIQUID (ML) ORAL DAILY
COMMUNITY

## 2018-12-14 RX ORDER — CEFEPIME HYDROCHLORIDE 2 G/50ML
2000 INJECTION, SOLUTION INTRAVENOUS EVERY 12 HOURS
Status: DISCONTINUED | OUTPATIENT
Start: 2018-12-14 | End: 2018-12-14

## 2018-12-14 RX ORDER — ONDANSETRON 2 MG/ML
4 INJECTION INTRAMUSCULAR; INTRAVENOUS ONCE
Status: COMPLETED | OUTPATIENT
Start: 2018-12-14 | End: 2018-12-14

## 2018-12-14 RX ORDER — LEVETIRACETAM 100 MG/ML
1000 SOLUTION ORAL EVERY 12 HOURS SCHEDULED
Status: DISCONTINUED | OUTPATIENT
Start: 2018-12-14 | End: 2018-12-19 | Stop reason: HOSPADM

## 2018-12-14 RX ORDER — IBUPROFEN 200 MG
TABLET ORAL EVERY 6 HOURS PRN
COMMUNITY
End: 2019-01-17 | Stop reason: HOSPADM

## 2018-12-14 RX ORDER — SODIUM CHLORIDE 9 MG/ML
125 INJECTION, SOLUTION INTRAVENOUS CONTINUOUS
Status: DISCONTINUED | OUTPATIENT
Start: 2018-12-14 | End: 2018-12-14

## 2018-12-14 RX ORDER — LANOLIN ALCOHOL/MO/W.PET/CERES
6 CREAM (GRAM) TOPICAL
Status: DISCONTINUED | OUTPATIENT
Start: 2018-12-14 | End: 2018-12-18

## 2018-12-14 RX ORDER — VALPROIC ACID 250 MG/5ML
250 SOLUTION ORAL EVERY 8 HOURS
Status: DISCONTINUED | OUTPATIENT
Start: 2018-12-14 | End: 2018-12-19 | Stop reason: HOSPADM

## 2018-12-14 RX ORDER — LEVOCARNITINE 1 G/10ML
750 SOLUTION ORAL 3 TIMES DAILY
Status: DISCONTINUED | OUTPATIENT
Start: 2018-12-14 | End: 2018-12-19 | Stop reason: HOSPADM

## 2018-12-14 RX ORDER — TOPIRAMATE 100 MG/1
200 TABLET, FILM COATED ORAL EVERY 12 HOURS SCHEDULED
Status: DISCONTINUED | OUTPATIENT
Start: 2018-12-14 | End: 2018-12-19 | Stop reason: HOSPADM

## 2018-12-14 RX ORDER — RUFINAMIDE 200 MG/1
1200 TABLET, FILM COATED ORAL 2 TIMES DAILY
Status: DISCONTINUED | OUTPATIENT
Start: 2018-12-14 | End: 2018-12-19 | Stop reason: HOSPADM

## 2018-12-14 RX ORDER — PHENOBARBITAL 20 MG/5ML
60 ELIXIR ORAL
Status: DISCONTINUED | OUTPATIENT
Start: 2018-12-14 | End: 2018-12-17

## 2018-12-14 RX ORDER — RUFINAMIDE 400 MG/1
1200 TABLET, FILM COATED ORAL 2 TIMES DAILY
Status: DISCONTINUED | OUTPATIENT
Start: 2018-12-14 | End: 2018-12-14

## 2018-12-14 RX ADMIN — VANCOMYCIN HYDROCHLORIDE 750 MG: 1 INJECTION, POWDER, LYOPHILIZED, FOR SOLUTION INTRAVENOUS at 08:13

## 2018-12-14 RX ADMIN — LEVETIRACETAM 1000 MG: 100 SOLUTION ORAL at 21:25

## 2018-12-14 RX ADMIN — CEFEPIME HYDROCHLORIDE 2000 MG: 2 INJECTION, SOLUTION INTRAVENOUS at 07:27

## 2018-12-14 RX ADMIN — MELATONIN TAB 3 MG 6 MG: 3 TAB at 23:24

## 2018-12-14 RX ADMIN — METRONIDAZOLE 500 MG: 500 INJECTION, SOLUTION INTRAVENOUS at 14:42

## 2018-12-14 RX ADMIN — PHENOBARBITAL 60 MG: 20 LIQUID ORAL at 21:25

## 2018-12-14 RX ADMIN — SODIUM CHLORIDE 1000 ML: 0.9 INJECTION, SOLUTION INTRAVENOUS at 21:21

## 2018-12-14 RX ADMIN — ENOXAPARIN SODIUM 40 MG: 40 INJECTION SUBCUTANEOUS at 11:59

## 2018-12-14 RX ADMIN — IPRATROPIUM BROMIDE AND ALBUTEROL SULFATE 3 ML: 2.5; .5 SOLUTION RESPIRATORY (INHALATION) at 05:57

## 2018-12-14 RX ADMIN — TOPIRAMATE 200 MG: 100 TABLET, FILM COATED ORAL at 21:26

## 2018-12-14 RX ADMIN — RUFINAMIDE 1200 MG: 200 TABLET, FILM COATED ORAL at 17:50

## 2018-12-14 RX ADMIN — LACTULOSE 30 G: 10 SOLUTION ORAL at 11:59

## 2018-12-14 RX ADMIN — LEVOCARNITINE 750 MG: 1 SOLUTION ORAL at 21:24

## 2018-12-14 RX ADMIN — LACTULOSE 30 G: 10 SOLUTION ORAL at 16:07

## 2018-12-14 RX ADMIN — SODIUM CHLORIDE 1000 ML: 0.9 INJECTION, SOLUTION INTRAVENOUS at 11:50

## 2018-12-14 RX ADMIN — ONDANSETRON 4 MG: 2 INJECTION, SOLUTION INTRAMUSCULAR; INTRAVENOUS at 06:01

## 2018-12-14 RX ADMIN — ACETAMINOPHEN 650 MG: 650 SUPPOSITORY RECTAL at 06:28

## 2018-12-14 RX ADMIN — VANCOMYCIN HYDROCHLORIDE 750 MG: 750 INJECTION, POWDER, LYOPHILIZED, FOR SOLUTION INTRAVENOUS at 16:09

## 2018-12-14 RX ADMIN — SODIUM CHLORIDE 1000 ML: 0.9 INJECTION, SOLUTION INTRAVENOUS at 09:37

## 2018-12-14 RX ADMIN — CEFEPIME HYDROCHLORIDE 2000 MG: 2 INJECTION, POWDER, FOR SOLUTION INTRAVENOUS at 23:38

## 2018-12-14 RX ADMIN — LEVETIRACETAM 1000 MG: 100 SOLUTION ORAL at 13:03

## 2018-12-14 RX ADMIN — SODIUM CHLORIDE 2000 ML: 0.9 INJECTION, SOLUTION INTRAVENOUS at 05:57

## 2018-12-14 RX ADMIN — PERAMPANEL 8 MG: 4 TABLET ORAL at 21:25

## 2018-12-14 RX ADMIN — LACTULOSE 30 G: 10 SOLUTION ORAL at 21:23

## 2018-12-14 RX ADMIN — RUFINAMIDE 1200 MG: 200 TABLET, FILM COATED ORAL at 11:58

## 2018-12-14 RX ADMIN — SODIUM CHLORIDE 100 ML/HR: 0.9 INJECTION, SOLUTION INTRAVENOUS at 19:38

## 2018-12-14 RX ADMIN — VALPROIC ACID 250 MG: 500 SOLUTION ORAL at 13:05

## 2018-12-14 RX ADMIN — SODIUM CHLORIDE 1000 ML: 0.9 INJECTION, SOLUTION INTRAVENOUS at 13:07

## 2018-12-14 RX ADMIN — SODIUM CHLORIDE 250 ML: 0.9 INJECTION, SOLUTION INTRAVENOUS at 18:33

## 2018-12-14 RX ADMIN — ONDANSETRON 4 MG: 2 INJECTION, SOLUTION INTRAMUSCULAR; INTRAVENOUS at 06:02

## 2018-12-14 RX ADMIN — Medication 200 MG: at 21:44

## 2018-12-14 RX ADMIN — METRONIDAZOLE 500 MG: 500 INJECTION, SOLUTION INTRAVENOUS at 22:15

## 2018-12-14 RX ADMIN — ACETAMINOPHEN 650 MG: 650 SUPPOSITORY RECTAL at 09:48

## 2018-12-14 RX ADMIN — LEVOCARNITINE 750 MG: 1 SOLUTION ORAL at 16:07

## 2018-12-14 RX ADMIN — TOPIRAMATE 200 MG: 100 TABLET, FILM COATED ORAL at 11:59

## 2018-12-14 RX ADMIN — LEVOCARNITINE 750 MG: 1 SOLUTION ORAL at 11:58

## 2018-12-14 RX ADMIN — VALPROIC ACID 250 MG: 500 SOLUTION ORAL at 21:26

## 2018-12-14 RX ADMIN — CEFEPIME HYDROCHLORIDE 2000 MG: 2 INJECTION, POWDER, FOR SOLUTION INTRAVENOUS at 12:27

## 2018-12-14 NOTE — H&P
H&P- Joanne Vance GGZSRH 1980, 41 y o  female MRN: [id number]    Unit/Bed#:  Encounter: [id number]    Primary Care Provider: Michael Amanda DO   Date and time admitted to hospital: 12/14/2018  5:44 AM        Severe sepsis [facility])   Assessment & Plan    Severe sepsis, present on admission, likely secondary to pneumonia, suspected aspiration pneumonia, patient also with history of MRSA colonization, will continue broad-spectrum antibiotics cefepime, vancomycin, Flagyl today is day 1 of antibiotic therapy  Follow up cultures  Admit to ICU  Monitor respiratory status  NPO except meds     Aspiration pneumonia ([facility] [address] )   Assessment & Plan    Suspected aspiration pneumonia    Check procalcitonin level       Acute respiratory failure with hypoxia [facility])   Assessment & Plan    Patient is reported to have significant hypoxia by EMS, oxygen level corrected with the application of oxygen, patient admitted on Vapotherm, continue with Vapotherm in the ICU at least times 24 hr     Intractable epilepsy without status epilepticus [facility])   Assessment & Plan    Chronic condition, resume medical regimen which is quite complex, nursing staff to confirm dosing with nursing home records     Acute encephalopathy   Assessment & Plan    Acute metabolic encephalopathy, patient does respond to verbal and physical stimuli, she has not had her baseline mental status, encephalopathy likely secondary to underlying infection as above     Lactic acidosis   Assessment & Plan    Follow up repeat lactic acid level     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with tonic seizures ([facility] [address] )   Assessment & Plan    Plan as above for history of seizures     Hyperammonemia (HCC)   Assessment & Plan    Chronic condition, continue lactulose     Intellectual disability   Assessment & Plan    Chronic disability, treat acute medical illness as above     Underweight   Assessment & Plan    Will consult Nutrition once patient is medically stable     Transaminitis   Assessment & Plan    Chronic       VTE Prophylaxis: Enoxaparin (Lovenox)  / sequential compression device   Code Status: full code  POLST: There is no POLST form on file for this patient (pre-hospital)    Anticipated Length of Stay:  Patient will be admitted on an Inpatient basis with an anticipated length of stay of  greater than 2 midnights  Justification for Hospital Stay:  Multiple medical issues as noted above including acute respiratory failure with hypoxia, severe sepsis  Critical care time today 45 min    Chief Complaint:   Hypoxia and fever    History of Present Illness:    Jeanette Lugo is a 41 y o  female who presents with pulse ox of 85% on room air and fever to 103° from nursing home  ER records reviewed, EMS reported several episodes of vomiting, patient had a fever 103°, heart rate of 135 patient shaking well vomiting pulse ox was noted to be 5%, patient nonverbal at baseline  Review of Systems:    Review of Systems   Unable to perform ROS: Patient nonverbal       Past Medical and Surgical History:     Past Medical History:   Diagnosis Date    ADHD     Anoxic brain damage ([facility] [address] )     Autistic disorder     Hyperammonemia (HCC)     Hyperkeratosis     Hypotension     Intellectual disability     Intellectual disability     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with tonic seizures (HCC)     Lethargy     Liver enzyme elevation     Onychomycosis     Osteoporosis     Osteoporosis     Psychiatric disorder     anxiety       Past Surgical History:   Procedure Laterality Date    ABDOMINAL SURGERY      CARDIAC PACEMAKER PLACEMENT      JEJUNOSTOMY FEEDING TUBE      PEG TUBE PLACEMENT         Meds/Allergies:    Prior to Admission medications    Medication Sig Start Date End Date Taking?  Authorizing Provider   bisacodyl (FLEET) 10 MG/30ML ENEM Insert 10 mg into the rectum as needed for constipation   Yes Historical Provider, MD   enoxaparin (LOVENOX) 40 mg/0 4 mL Inject 0 4 mL (40 mg total) under the skin every 24 hours 10/13/18  Yes Max Restrepo, DO   lactulose 20 g/30 mL 45 mL (30 g total) by Per G Tube route 3 (three) times a day 10/13/18  Yes Max Restrepo, DO   levETIRAcetam (KEPPRA) 100 mg/mL oral solution 10 mL (1,000 mg total) by Per G Tube route every 12 (twelve) hours for 30 days 9/4/18 12/14/18 Yes Jhonny Dawn MD   levOCARNitine (CARNITOR) 1 g/10 mL solution 7 5 mL (750 mg total) by Per G Tube route 3 (three) times a day 9/4/18  Yes Jhonny Dawn MD   magnesium hydroxide (MILK OF MAGNESIA) 400 mg/5 mL oral suspension Take 30 mL by mouth daily as needed for constipation   Yes Historical Provider, MD   MELATONIN PO 6 mg by Per G Tube route daily at bedtime   Yes Historical Provider, MD   Multiple Vitamins-Minerals (CENTRUM SILVER PO) 5 mL/mL by Per G Tube route daily     Yes Historical Provider, MD   ondansetron (ZOFRAN) 4 mg tablet 4 mg by Per G Tube route every 8 (eight) hours as needed for nausea or vomiting     Yes Historical Provider, MD   perampanel [facility]) oral suspension 16 mL (8 mg total) by Per NG Tube route daily at bedtime Per G tube  Patient taking differently: 20 mg by Per NG Tube route daily at bedtime Per G tube  9/4/18  Yes Jhonny Dawn MD   PHENobarbital 20 mg/5 mL elixir Take 15 mL (60 mg total) by mouth daily at bedtime 9/4/18  Yes Jhonny Dawn MD   Probiotic Product (ALEXANDER-BID PROBIOTIC PO) 1 tablet by Per G Tube route daily     Yes Historical Provider, MD   rufinamide (BANZEL) 400 mg tablet 3 tablets (1,200 mg total) by Per G Tube route 2 (two) times a day 9/4/18  Yes Jhonny Dawn MD   Sennosides-Docusate Sodium (SENEXON-S PO) 1 tablet by Per G Tube route daily     Yes Historical Provider, MD   topiramate (TOPAMAX) 200 MG tablet Take 1 tablet (200 mg total) by mouth every 12 (twelve) hours 9/4/18  Yes Jhonny Dawn MD   valproic acid (DEPAKENE) 250 MG/5ML soln 5 mL (250 mg total) by Per G Tube route every 8 (eight) hours 9/4/18  Yes Jhonny Dawn MD   chlorhexidine (HIBICLENS) 4 % external liquid Apply 1 application topically daily 10/13/18   [employer], DO     I have reviewed home medications with a medical source (PCP, Pharmacy, other)  Allergies: Allergies   Allergen Reactions    Felbamate        Social History:     Marital Status: Single     Substance Use History:   History   Alcohol Use No     History   Smoking Status    Never Smoker   Smokeless Tobacco    Never Used     History   Drug Use No       Family History:    non-contributory    Physical Exam:     Vitals:   Blood Pressure: 107/63 (12/14/18 1045)  Pulse: (!) 121 (12/14/18 1045)  Temperature: (!) 104 °F (40 °C) (12/14/18 1026)  Temp Source: Tympanic (12/14/18 1026)  Respirations: (!) 23 (12/14/18 1045)  Weight - Scale: 49 3 kg (108 lb 11 oz) (12/14/18 0541)  SpO2: 96 % (12/14/18 1045)    Physical Exam   Constitutional:   Chronically ill-appearing female, no acute distress she does appear diaphoretic  HENT:   Head: Normocephalic  Eyes: Pupils are equal, round, and reactive to light  Cardiovascular:   No murmur heard  Increased rate   Pulmonary/Chest:   Rhonchi bilaterally   Abdominal: Soft  Bowel sounds are normal  She exhibits no distension  There is no tenderness  There is no rebound  Musculoskeletal: She exhibits no edema  Neurological:   Response to physical stimuli   Skin: Skin is warm  No rash noted  She is diaphoretic  No erythema  No pallor  Nursing note and vitals reviewed  Additional Data:     Lab Results: I have personally reviewed pertinent reports          Results from last 7 days  Lab Units 12/14/18  0644   WBC Thousand/uL 26 11*   HEMOGLOBIN g/dL 13 3   HEMATOCRIT % 41 6   PLATELETS Thousands/uL 268   NEUTROS PCT % 82*   LYMPHS PCT % 6*   MONOS PCT % 11   EOS PCT % 0       Results from last 7 days  Lab Units 12/14/18  0659   POTASSIUM mmol/L 3 7   CHLORIDE mmol/L 105   CO2 mmol/L 27   BUN mg/dL 12   CREATININE mg/dL 0 47*   CALCIUM mg/dL 8 1*   ALK PHOS U/L 125*   ALT U/L 94*   AST U/L 91*       Results from last 7 days  Lab Units 12/14/18  0659   INR  1 37*       Imaging: I have personally reviewed pertinent reports  Xr Chest 1 View Portable    Result Date: 12/14/2018  Narrative: CHEST INDICATION:   sob  COMPARISON:  10/8/2018 EXAM PERFORMED/VIEWS:  XR CHEST PORTABLE FINDINGS:  Left-sided chest wall pacemaker is identified  Pacemaker leads are intact  Cardiomediastinal silhouette appears unremarkable  Suggestion of left basilar retrocardiac atelectasis/small infiltrate  No pneumothorax or pleural effusion  Osseous structures appear within normal limits for patient age  Impression: Suggestion of left basilar retrocardiac atelectasis/small infiltrate  Consider PA and lateral views  Workstation performed: [id number]       [employer] / Epic Records Reviewed: Yes     ** Please Note: This note has been constructed using a voice recognition system   **

## 2018-12-14 NOTE — RESPIRATORY THERAPY NOTE
RT Protocol Note  Achievers Sports 41 y o  female MRN: [id number]  Unit/Bed#:  Encounter: [id number]    Assessment    Active Problems:    Acute encephalopathy    Lennox-Gastaut syndrome with tonic seizures (HCC)    Lactic acidosis    Underweight    Hyperammonemia (HCC)    Acute respiratory failure with hypoxia (HCC)    Intractable epilepsy without status epilepticus ([address] )    Transaminitis    Aspiration pneumonia (HCC)    Severe sepsis (HCC)      Home Pulmonary Medications:  none       Past Medical History:   Diagnosis Date    ADHD     Anoxic brain damage (HCC)     Autistic disorder     Hyperammonemia (HCC)     Hyperkeratosis     Hypotension     Intellectual disability     Intellectual disability     Lennox-Gastaut syndrome with tonic seizures (HCC)     Lethargy     Liver enzyme elevation     Onychomycosis     Osteoporosis     Osteoporosis     Psychiatric disorder     anxiety     Social History     Social History    Marital status: Single     Spouse name: N/A    Number of children: N/A    Years of education: N/A     Social History Main Topics    Smoking status: Never Smoker    Smokeless tobacco: Never Used    Alcohol use No    Drug use: No    Sexual activity: No     Other Topics Concern    None     Social History Narrative    None       Subjective         Objective    Physical Exam:   Assessment Type: Assess only  General Appearance: Awake, Alert  Respiratory Pattern: Shallow  Chest Assessment: Chest expansion symmetrical  Bilateral Breath Sounds: Diminished, Rhonchi    Vitals:  Blood pressure 99/66, pulse 102, temperature (!) 100 8 °F (38 2 °C), temperature source Tympanic, resp  rate (!) 24, weight 52 kg (114 lb 10 2 oz), SpO2 96 %        Results from last 7 days  Lab Units 12/14/18  0553   PH ART  7 418   PCO2 ART mm Hg 42 7   PO2 ART mm Hg 79 3   HCO3 ART mmol/L 27 0   BASE EXC ART mmol/L 2 1   O2 CONTENT ART mL/dL 19 5   O2 HGB, ARTERIAL % 95 0   ABG SOURCE  Brachial, Left Imaging and other studies: I have personally reviewed pertinent reports  Plan         No nebulizer treatments needed  Maintain on Vapotherm due to hypoxia and sepsis

## 2018-12-14 NOTE — RESPIRATORY THERAPY NOTE
Pt was taken from ER to  on NRB, SpO2 99% during transport  Upon arrival to ICU pt was placed back on Vapotherm  Vapotherm was decreased to 25L and 40%  RN aware  Will continue to monitor

## 2018-12-14 NOTE — ASSESSMENT & PLAN NOTE
Acute metabolic encephalopathy, patient does respond to verbal and physical stimuli, she has not had her baseline mental status, encephalopathy likely secondary to underlying infection as above

## 2018-12-14 NOTE — PLAN OF CARE
CARDIOVASCULAR - ADULT     Maintains optimal cardiac output and hemodynamic stability Progressing     Absence of cardiac dysrhythmias or at baseline rhythm Progressing        GASTROINTESTINAL - ADULT     Minimal or absence of nausea and/or vomiting Progressing     Maintains or returns to baseline bowel function Progressing     Maintains adequate nutritional intake Progressing        GENITOURINARY - ADULT     Maintains or returns to baseline urinary function Progressing     Absence of urinary retention Progressing        HEMATOLOGIC - ADULT     Maintains hematologic stability Progressing        METABOLIC, FLUID AND ELECTROLYTES - ADULT     Electrolytes maintained within normal limits Progressing     Fluid balance maintained Progressing     Glucose maintained within target range Progressing        MUSCULOSKELETAL - ADULT     Maintain or return mobility to safest level of function Progressing     Maintain proper alignment of affected body part Progressing        NEUROSENSORY - ADULT     Achieves stable or improved neurological status Progressing     Absence of seizures Progressing     Remains free of injury related to seizures activity Progressing     Achieves maximal functionality and self care Progressing        Prexisting or High Potential for Compromised Skin Integrity     Skin integrity is maintained or improved Progressing        RESPIRATORY - ADULT     Achieves optimal ventilation and oxygenation Progressing        SKIN/TISSUE INTEGRITY - ADULT     Skin integrity remains intact Progressing     Incision(s), wounds(s) or drain site(s) healing without S/S of infection Progressing     Oral mucous membranes remain intact Progressing

## 2018-12-14 NOTE — ASSESSMENT & PLAN NOTE
Patient is reported to have significant hypoxia by EMS, oxygen level corrected with the application of oxygen, patient admitted on Vapotherm, continue with Vapotherm in the ICU at least times 24 hr

## 2018-12-14 NOTE — ASSESSMENT & PLAN NOTE
Severe sepsis, present on admission, likely secondary to pneumonia, suspected aspiration pneumonia, patient also with history of MRSA colonization, will continue broad-spectrum antibiotics cefepime, vancomycin, Flagyl today is day 1 of antibiotic therapy  Follow up cultures  Admit to ICU  Monitor respiratory status    NPO except meds

## 2018-12-14 NOTE — UTILIZATION REVIEW
Initial Clinical Review    Admission: Date/Time/Statement: 12/14/18 @ 0742     Orders Placed This Encounter   Procedures    Inpatient Admission (expected length of stay for this patient is greater than two midnights)     Standing Status:   Standing     Number of Occurrences:   1     Order Specific Question:   Admitting Physician     Answer:   Chris Cunningham [[id number]]     Order Specific Question:   Level of Care     Answer:   Critical Care [15]     Order Specific Question:   Estimated length of stay     Answer:   More than 2 Midnights     Order Specific Question:   Certification     Answer:   I certify that inpatient services are medically necessary for this patient for a duration of greater than two midnights  See H&P and MD Progress Notes for additional information about the patient's course of treatment  ED: Date/Time/Mode of Arrival:   ED Arrival Information     Expected Arrival Acuity Means of Arrival Escorted By Service Admission Type    - 12/14/2018 05:36 Emergent Ambulance [address] [facility]  [facility]) General Medicine Emergency    Arrival Complaint    Fever          Chief Complaint:   Chief Complaint   Patient presents with    Fever - 9 weeks to 74 years     Patient brought in by EMS from [facility] for decreased O2 saturation, possible aspiraton pneumonia, vomiting, and fever  History of Illness:       Dara Roa is a 41 y o  female who presents with pulse ox of 85% on room air and fever to 103° from nursing home    ER records reviewed, EMS reported several episodes of vomiting, patient had a fever 103°, heart rate of 135 patient shaking while vomiting pulse ox was noted to be 5%, patient nonverbal at baseline      12/14/18 0541   103 °F (39 4 °C)   121   26  116/83  --  94 %  Non-rebreather mask  Lying     (37 8 °C)   109  22  100/57  --  95 %  Other (comment)  Lying     O2 Device: high flow at 12/14/18 0800   12/14/18 0730  --  104   24  98/55  --  98 %  Other (comment) Lying   O2 Device: 60 % oxygen high flow at 12/14/18 0730     wgt   114 lb/  52 kg     Abnormal Labs/Diagnostic Test Results:  Wbc  26 11  hgb  13 3  abg wnl   cxr -  Suggestion of left basilar retrocardiac atelectasis/small infiltrate    12/14  ABG   7 325/  34 1/ 112/ 17 8  K 3 1  Co2 =  19    ED Treatment:   Medication Administration from 12/14/2018 0536 to 12/14/2018 1025       Date/Time Order Dose Route Action Action by Comments     12/14/2018 0601 ondansetron (ZOFRAN) injection 4 mg 4 mg Intravenous Given Neisha Montero RN      12/14/2018 0557 ipratropium-albuterol (DUO-NEB) 0 5-2 5 mg/3 mL inhalation solution 3 mL 3 mL Nebulization Given Neisha Montero RN      12/14/2018 2154 acetaminophen (TYLENOL) rectal suppository 650 mg 650 mg Rectal Given Neisha Montero RN      12/14/2018 0602 ondansetron (ZOFRAN) injection 4 mg 4 mg Intravenous Given Neisha Montero RN      12/14/2018 0557 sodium chloride 0 9 % bolus 2,000 mL 2,000 [facility] Ne[facility]      12/14/2018 0813 vancomycin (VANCOCIN) 750 mg in sodium chloride 0 9 % 250 mL IVPB 750 mg Intravenous [address] Maria Del Carmen Chen[address]      12/14/2018 0809 cefepime (MAXIPIME) IVPB (premix) 2,000 mg 0 mg Intravenous Stopped Maria Del Carmen Chen RN      12/14/2018 0727 cefepime (MAXIPIME) IVPB (premix) 2,000 mg 2,000 mg Intravenous New Bag Albert Bean RN      12/14/2018 0937 sodium chloride 0 9 % bolus 1,000 mL 1,000 mL Intravenous New Bag Radha Nicholas RN      12/14/2018 0948 acetaminophen (TYLENOL) rectal suppository 650 mg 650 mg Rectal Given Albert Bean RN           Past Medical/Surgical History:    Active Ambulatory Problems     Diagnosis Date Noted    Pneumonia 02/22/2017    Acute encephalopathy 02/23/2017    Lennox-Gastaut syndrome with tonic seizures ([facility] [address] ) 07/06/2017    Lactic acidosis 07/06/2017    Underweight 11/22/2017    Intellectual disability 11/22/2017    Hyperammonemia (HCC) 11/23/2017    Acute respiratory failure with hypoxia ([facility] ) 12/02/2017    Osteoporosis 09/17/2013    Onychomycosis 09/17/2013    Lethargy 10/10/2016    Hyperkeratosis 09/17/2013    Intractable epilepsy without status epilepticus ([place] [address] ) 04/23/2018    Somnolence 08/18/2018    Transaminitis 08/18/2018    Hypotension 08/20/2018    Incontinence 08/23/2018    Aspiration pneumonia ([facility] ) 10/07/2018    Severe sepsis ([place] [address] ) 10/09/2018    Skin breakdown 10/09/2018    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    Hypoalbuminemia 10/10/2018    Right atrial enlargement 10/13/2018     Resolved Ambulatory Problems     Diagnosis Date Noted    Dysphagia 02/22/2017    HCAP (healthcare-associated pneumonia) 02/23/2017    Positive blood culture 07/07/2017    Gastrostomy tube obstruction (HCC) 07/14/2017    Excessive daytime sleepiness 11/22/2017    Acute DANIEL (middle ear effusion) 11/23/2017    Macrocytic anemia 11/24/2017    Hypokalemia 11/24/2017    Hypernatremia 12/06/2017    Elevated liver enzymes 06/21/2016    Cerebral anoxic injury ([place] [address] ) 09/17/2013    ADHD, 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    Acute hepatic encephalopathy 08/18/2018    Sepsis due to undetermined organism ([place] [address] ) 09/07/2018    Encephalopathy 10/07/2018    Elevated MCV 10/10/2018     Past Medical History:   Diagnosis Date    ADHD     Anoxic brain damage (HCC)     Autistic disorder     Hyperammonemia (HCC)     Hyperkeratosis     Hypotension     Intellectual disability     Intellectual disability     Lennox-Gastaut syndrome with tonic seizures (HCC)     Lethargy     Liver enzyme elevation     Onychomycosis     Osteoporosis     Osteoporosis     Psychiatric disorder        Admitting Diagnosis: Vomiting [R11 10]  Pneumonia [J18 9]  Respiratory distress [R06 03]  Lennox-Gastaut syndrome with tonic seizures (HCC) [G40 812]  Hypoxia [R09 02]  Fever [R50 9]  Hx of seizure disorder [Z86 69]  Sepsis ([place] [address] ) [A41 9]    Assessment/Plan:  Severe sepsis poa, 2/2 suspected aspiration PNU w/acute resp failure _ h/o MRSA :  Broad spectrum abx, flagyl  Admit ICU, NPO, fup cx, ck procalcitonin, continuous oxygen per vapotherm    Epilepsy - home meds   Acute metabolic encephalopathy -  Likely 2/2 underlying infection/sepsis  Hyperammonemia -  Chronic, monitor   Lactic acidosis -  Monitor  Intellectual disability - treat, safety measures  Underweight -  Consult nutrition   transaminitis -  Chronic      VTE Prophylaxis: Enoxaparin (Lovenox)  / sequential compression device  Patient will be admitted on an Inpatient basis with an anticipated length of stay of  greater than 2 midnights     Justification for Hospital Stay:  Multiple medical issues as noted above including acute respiratory failure with hypoxia, severe sepsis      Admission Orders:  Admit ICU  Continuous VAPOTHERM/ Oxygen supplementation - wean as tolerated  Sequential compression device to b/l LE  Urine/blood cx  Daily wgt;  I/O q shift  Up w/assist  NPO      12/14/18 1207   100 8 °F (38 2 °C)   107  22  99/66  --  98 %  Other (comment)  Lying   O2 Device: high flow at 12/14/18 1207   12/14/18 1045  --   121   23  107/63  80  96 %  --  --   12/14/18 1026   104 °F (40 °C)  --  --  105/63  79  --  --  --   12/14/18 1025  --  --  --  --  --  99 %  --  --   12/14/18 0945  --   116   26  93/51  --  95 %  Other (comment)  Lying   12/14/18 0941   104 °F (40 °C)  --  --  --  --  --  --  --   12/14/18 0915   102 5 °F (39 2 °C)   111   24   88/52  --  96 %  Other (comment)  Lying       IVF BOLUS  1 Liter  Given  @  0401  And  Again  At  0937      Scheduled Meds:   Current Facility-Administered Medications:  cefepime 2,000 mg Intravenous Q12H Julia Benavides MD Last Rate: 2,000 mg (12/14/18 1227)   enoxaparin 40 mg Subcutaneous Q24H [address] Elie Enrique, DO    lactulose 30 g Per G Tube TID Elie Enrique, DO    levETIRAcetam 1,000 mg Per G Tube Q12H [address] Elie Enrique, DO    levOCARNitine 750 mg Per G Tube TID Elie Enrique, DO    melatonin 6 mg Per G Tube HS Elie Enrique, DO    metroNIDAZOLE 500 mg Intravenous [facility] Palomo Leonie, DO    ondansetron 4 mg Oral Q4H PRN Palomo Leonie, DO    Perampanel 8 mg Per NG Tube HS Palomo Leonie, DO    PHENobarbital 60 mg Oral HS Palomo Leonie, DO    rufinamide 1,200 mg Per G Tube BID Manuel Parikh MD    sodium chloride 1,000 mL Intravenous Once Palomo Leonie, DO    topiramate 200 mg Oral Q12H [address] Palomo Leonie, DO    valproic acid 250 mg Per G Tube Q8H Palomo Leonie, DO    vancomycin 15 mg/kg Intravenous Q8H Palomo Leonie, DO

## 2018-12-14 NOTE — ED PROVIDER NOTES
History  Chief Complaint   Patient presents with    Fever - 9 weeks to 74 years     Patient brought in by EMS from [facility] for decreased O2 saturation, possible aspiraton pneumonia, vomiting, and fever  Pt sent from [facility] with hx of finding pt SOB -O2 sat 85% and fever   EMS arrival pt had vomited 2 time PTA and 1 time for them  Pt with temp of 103  HR  and   Pt was shaking while vomiting - unsure of seizure activity  Pt non verbal , Hx Anoxic brain injury; Lennox-Gastault syndrome with tonic/clonic seizures; osteoporosis; hx resp failures  Dysphagia, MRSA    Pt presents lethargic   Non- rebreather with O2 Sat 92%  Has injected right eye  Pt temp 103  Pt with soft brown stool   Has PEG tube ( also does get some oral feedings )        History provided by:  EMS personnel and nursing home  History limited by: brain injury pt   Fever - 9 weeks to 74 years   Max temp prior to arrival:  103  Temp source:  Unable to specify  Onset quality:  Unable to specify  Chronicity:  New  Relieved by:  None tried  Associated symptoms: somnolence and vomiting    Associated symptoms: no congestion and no rash  Confusion: chronic  Risk factors: no contaminated food        Prior to Admission Medications   Prescriptions Last Dose Informant Patient Reported? Taking?    MELATONIN PO   Yes Yes   Si mg by Per G Tube route daily at bedtime   Multiple Vitamins-Minerals (CENTRUM SILVER PO)   Yes Yes   Si mL/mL by Per G Tube route daily     PHENobarbital 20 mg/5 mL elixir   No Yes   Sig: Take 15 mL (60 mg total) by mouth daily at bedtime   Probiotic Product (ALEXANDER-BID PROBIOTIC PO)   Yes Yes   Si tablet by Per G Tube route daily     Sennosides-Docusate Sodium (SENEXON-S PO)   Yes Yes   Si tablet by Per G Tube route daily     bisacodyl (FLEET) 10 MG/30ML ENEM   Yes Yes   Sig: Insert 10 mg into the rectum as needed for constipation   chlorhexidine (HIBICLENS) 4 % external liquid   No No   Sig: Apply 1 application topically daily   enoxaparin (LOVENOX) 40 mg/0 4 mL   No Yes   Sig: Inject 0 4 mL (40 mg total) under the skin every 24 hours   lactulose 20 g/30 mL   No Yes   Si mL (30 g total) by Per G Tube route 3 (three) times a day   levETIRAcetam (KEPPRA) 100 mg/mL oral solution   No Yes   Sig: 10 mL (1,000 mg total) by Per G Tube route every 12 (twelve) hours for 30 days   levOCARNitine (CARNITOR) 1 g/10 mL solution   No Yes   Si 5 mL (750 mg total) by Per G Tube route 3 (three) times a day   magnesium hydroxide (MILK OF MAGNESIA) 400 mg/5 mL oral suspension   Yes Yes   Sig: Take 30 mL by mouth daily as needed for constipation   ondansetron (ZOFRAN) 4 mg tablet   Yes Yes   Si mg by Per G Tube route every 8 (eight) hours as needed for nausea or vomiting     perampanel (FYCOMPA) oral suspension   No Yes   Si mL (8 mg total) by Per NG Tube route daily at bedtime Per G tube   Patient taking differently: 20 mg by Per NG Tube route daily at bedtime Per G tube    rufinamide (BANZEL) 400 mg tablet   No Yes   Sig: 3 tablets (1,200 mg total) by Per G Tube route 2 (two) times a day   topiramate (TOPAMAX) 200 MG tablet   No Yes   Sig: Take 1 tablet (200 mg total) by mouth every 12 (twelve) hours   valproic acid (DEPAKENE) 250 MG/5ML soln   No Yes   Si mL (250 mg total) by Per G Tube route every 8 (eight) hours      Facility-Administered Medications: None       Past Medical History:   Diagnosis Date    ADHD     Anoxic brain damage (HCC)     Autistic disorder     Hyperammonemia (HCC)     Hyperkeratosis     Hypotension     Intellectual disability     Intellectual disability     Lennox-Gastaut syndrome with tonic seizures (HCC)     Lethargy     Liver enzyme elevation     Onychomycosis     Osteoporosis     Osteoporosis     Psychiatric disorder     anxiety       Past Surgical History:   Procedure Laterality Date    ABDOMINAL SURGERY      CARDIAC PACEMAKER PLACEMENT      JEJUNOSTOMY FEEDING TUBE      PEG TUBE PLACEMENT         History reviewed  No pertinent family history  I have reviewed and agree with the history as documented  Social History   Substance Use Topics    Smoking status: Never Smoker    Smokeless tobacco: Never Used    Alcohol use No        Review of Systems   Unable to perform ROS: Other   Constitutional: Positive for activity change and fever  HENT: Negative for congestion and facial swelling  Trouble swallowing: known dysphagia  Eyes: Positive for redness (right)  Respiratory: Positive for shortness of breath and wheezing (rhonchi)  Gastrointestinal: Positive for vomiting  Skin: Negative for rash and wound  Neurological: Positive for tremors  Negative for syncope  Seizures: hx    Psychiatric/Behavioral: Confusion: chronic  Physical Exam  Physical Exam   Constitutional: She appears lethargic  She appears cachectic  She has a sickly appearance  She appears ill  Non verbal, contracted  Vomited yellow fluid on arrival-was suctioned for same    HENT:   Mouth/Throat: Mucous membranes are not cyanotic  Abnormal dentition  No posterior oropharyngeal edema or posterior oropharyngeal erythema  Eyes: Right conjunctiva is injected  Left conjunctiva is not injected  Right pupil is round  Left pupil is round  Pupils are equal    Neck: Neck supple  No JVD present  No tracheal deviation present  Cardiovascular: Intact distal pulses  Tachycardia present  No perf edema   Pulmonary/Chest: Tachypnea noted  She has decreased breath sounds  She has wheezes  She has rhonchi  Abdominal: Soft  She exhibits no distension  Bowel sounds are increased  There is no rigidity and no guarding  Neurological: She appears lethargic  She displays atrophy  She displays no tremor  She exhibits abnormal muscle tone  She displays no seizure activity  Non verbal  Responds to tactile stimulation reaches to remove suctioniong   Skin: Skin is warm and dry   She is not diaphoretic  No cyanosis  There is pallor  Facial acne   Vitals reviewed        Vital Signs  ED Triage Vitals   Temperature Pulse Respirations Blood Pressure SpO2   12/14/18 0541 12/14/18 0541 12/14/18 0541 12/14/18 0541 12/14/18 0541   (!) 103 °F (39 4 °C) (!) 121 (!) 26 116/83 94 %      Temp Source Heart Rate Source Patient Position - Orthostatic VS BP Location FiO2 (%)   12/14/18 0541 12/14/18 0541 12/14/18 0541 12/14/18 0541 12/14/18 0637   Temporal Monitor Lying Left arm 60      Pain Score       --                  Vitals:    12/14/18 0915 12/14/18 0945 12/14/18 1026 12/14/18 1045   BP: (!) 88/52 93/51 105/63 107/63   Pulse: (!) 111 (!) 116  (!) 121   Patient Position - Orthostatic VS: Lying Lying         Visual Acuity      ED Medications  Medications   sodium chloride 0 9 % bolus 1,000 mL (1,000 mL Intravenous New Bag 12/14/18 0937)   lactulose 20 g/30 mL oral solution 30 g (not administered)   levETIRAcetam (KEPPRA) oral solution 1,000 mg (not administered)   levOCARNitine (CARNITOR) oral solution 750 mg (not administered)   melatonin tablet 6 mg (not administered)   ondansetron (ZOFRAN) oral solution 4 mg (not administered)   perampanel (FYCOMPA) oral suspension 8 mg (not administered)   PHENobarbital oral elixir 60 mg (not administered)   rufinamide (BANZEL) tablet 1,200 mg (not administered)   topiramate (TOPAMAX) tablet 200 mg (not administered)   valproic acid (DEPAKENE) 250 MG/5ML oral soln 250 mg (not administered)   sodium chloride 0 9 % bolus 1,000 mL (not administered)     Followed by   sodium chloride 0 9 % bolus 1,000 mL (not administered)   metroNIDAZOLE (FLAGYL) IVPB (premix) 500 mg (not administered)   vancomycin (VANCOCIN) 750 mg in sodium chloride 0 9 % 250 mL IVPB (not administered)   cefepime (MAXIPIME) 2,000 mg in dextrose 5 % 50 mL IVPB (not administered)   ondansetron (ZOFRAN) injection 4 mg (4 mg Intravenous Given 12/14/18 0601)   ipratropium-albuterol (DUO-NEB) 0 5-2 5 mg/3 mL inhalation solution 3 mL (3 mL Nebulization Given 12/14/18 0557)   acetaminophen (TYLENOL) rectal suppository 650 mg (650 mg Rectal Given 12/14/18 0628)   ondansetron (ZOFRAN) injection 4 mg (4 mg Intravenous Given 12/14/18 0602)   sodium chloride 0 9 % bolus 2,000 mL (2,000 mL Intravenous New Bag 12/14/18 0557)   vancomycin (VANCOCIN) 750 mg in sodium chloride 0 9 % 250 mL IVPB (750 mg Intravenous New Bag 12/14/18 0813)   cefepime (MAXIPIME) IVPB (premix) 2,000 mg (0 mg Intravenous Stopped 12/14/18 0809)   acetaminophen (TYLENOL) rectal suppository 650 mg (650 mg Rectal Given 12/14/18 0948)       Diagnostic Studies  Results Reviewed     Procedure Component Value Units Date/Time    [place] draw [[id number]] Collected:  12/14/18 0749    Lab Status:  Final result Specimen:  Blood Updated:  12/14/18 0901    Narrative: The following orders were created for panel order [place] draw  Procedure                               Abnormality         Status                     ---------                               -----------         ------                     Lolita Maxim / Yellow tube on [facility][[id number]]                      Final result                 Please view results for these tests on the individual orders  Lactic acid, plasma [[id number]]  (Abnormal) Collected:  12/14/18 0749    Lab Status:  Final result Specimen:  Blood from Vein Updated:  12/14/18 0834     LACTIC ACID 3 4 (HH) mmol/L     Narrative:         Result may be elevated if tourniquet was used during collection  Rapid Influenza Screen with Reflex PCR [[id number]]  (Normal) Collected:  12/14/18 0749    Lab Status:  Final result Specimen:  Nasopharyngeal from Nasopharyngeal Swab Updated:  12/14/18 0818     Rapid Influenza A Ag Negative     Rapid Influenza B Ag Negative    Influenza A/B and RSV by PCR [[id number]] Collected:  12/14/18 0749    Lab Status:   In process Specimen:  Nasopharyngeal from Nasopharyngeal Swab Updated:  12/14/18 0817    Comprehensive metabolic panel [[id number]]  (Abnormal) Collected:  12/14/18 0659    Lab Status:  Final result Specimen:  Blood from Arm, Right Updated:  12/14/18 0746     Sodium 141 mmol/L      Potassium 3 7 mmol/L      Chloride 105 mmol/L      CO2 27 mmol/L      ANION GAP 9 mmol/L      BUN 12 mg/dL      Creatinine 0 47 (L) mg/dL      Glucose 131 mg/dL      Calcium 8 1 (L) mg/dL      AST 91 (H) U/L      ALT 94 (H) U/L      Alkaline Phosphatase 125 (H) U/L      Total Protein 5 9 (L) g/dL      Albumin 2 1 (L) g/dL      Total Bilirubin 0 60 mg/dL      eGFR 127 ml/min/1 73sq m     Narrative:         National Kidney Disease Education Program recommendations are as follows:  GFR calculation is accurate only with a steady state creatinine  Chronic Kidney disease less than 60 ml/min/1 73 sq  meters  Kidney failure less than 15 ml/min/1 73 sq  meters  B-type natriuretic peptide [[id number]]  (Abnormal) Collected:  12/14/18 0659    Lab Status:  Final result Specimen:  Blood from Arm, Right Updated:  12/14/18 0746     NT-proBNP 468 (H) pg/mL     Magnesium [[id number]]  (Normal) Collected:  12/14/18 0659    Lab Status:  Final result Specimen:  Blood from Arm, Right Updated:  12/14/18 0746     Magnesium 1 8 mg/dL     Protime-INR [[id number]]  (Abnormal) Collected:  12/14/18 0659    Lab Status:  Final result Specimen:  Blood from Arm, Right Updated:  12/14/18 0730     Protime 16 2 (H) seconds      INR 1 37 (H)    APTT [[id number]]  (Normal) Collected:  12/14/18 0659    Lab Status:  Final result Specimen:  Blood from Arm, Right Updated:  12/14/18 0730     PTT 34 seconds     Blood culture #1 [[id number]] Collected:  12/14/18 0718    Lab Status:   In process Specimen:  Blood from Arm, Left Updated:  12/14/18 0721    Ammonia [[id number]]  (Abnormal) Collected:  12/14/18 0659    Lab Status:  Final result Specimen:  Blood from Hand, Right Updated:  12/14/18 0719     Ammonia 37 (H) umol/L     Blood culture #2 [[id number]] Collected:  12/14/18 0659    Lab Status: In process Specimen:  Blood from Hand, Right Updated:  12/14/18 0704    CBC and differential [[id number]]  (Abnormal) Collected:  12/14/18 0644    Lab Status:  Final result Specimen:  Blood from Vein Updated:  12/14/18 0650     WBC 26 11 (H) Thousand/uL      RBC 3 95 Million/uL      Hemoglobin 13 3 g/dL      Hematocrit 41 6 %       (H) fL      MCH 33 7 pg      MCHC 32 0 g/dL      RDW 13 7 %      MPV 10 4 fL      Platelets 973 Thousands/uL      nRBC 0 /100 WBCs      Neutrophils Relative 82 (H) %      Immat GRANS % 1 %      Lymphocytes Relative 6 (L) %      Monocytes Relative 11 %      Eosinophils Relative 0 %      Basophils Relative 0 %      Neutrophils Absolute 21 60 (H) Thousands/µL      Immature Grans Absolute 0 14 Thousand/uL      Lymphocytes Absolute 1 44 Thousands/µL      Monocytes Absolute 2 86 (H) Thousand/µL      Eosinophils Absolute 0 00 Thousand/µL      Basophils Absolute 0 07 Thousands/µL     Blood gas, arterial [[id number]] Collected:  12/14/18 0553    Lab Status:  Final result Specimen:  Blood, Arterial from Brachial, Left Updated:  12/14/18 0607     pH, Arterial 7 418     pCO2, Arterial 42 7 mm Hg      pO2, Arterial 79 3 mm Hg      HCO3, Arterial 27 0 mmol/L      Base Excess, Arterial 2 1 mmol/L      O2 Content, Arterial 19 5 mL/dL      O2 HGB,Arterial  95 0 %      SOURCE Brachial, Left     Non Vent type-     UA w Reflex to Microscopic w Reflex to Culture [[id number]]     Lab Status:  No result Specimen:  Urine                  XR chest 1 view portable   Final Result by Marcelino Angel MD ([id number]/77 [id number])      Suggestion of left basilar retrocardiac atelectasis/small infiltrate  Consider PA and lateral views              Workstation performed: [id number]                    Procedures  Procedures       Phone Contacts  ED Phone Contact    ED Course  ED Course as of Dec 14 1115   Fri Dec 14, 2018   0543 Resp here to help with neb,abg and suction    0612 pH, Arterial: 7 418   0612 pH, Arterial: 7 418 0612 pCO2, Arterial: 42 7   0612 pO2, Arterial: 79 3   0612 Will try vapotherm    0701 WBC: (!) 26 11   0701 Hemoglobin: 13 3   0701 HCT: 41 6   0702 Suggestion of left basilar retrocardiac atelectasis/small infiltrate  Consider PA and lateral views  0719 BW obtained- except for Lactic  waiting results  Pt has had no further vomiting   Has had no seizure like movement  Repeat temp    0724 Repeat temp 100    0724 Ammonia: (!) 37   0724 Pt maintains  98 O2 sat on 60 % Vapotherm    0740 Spoke to Dr Michelle Elliott- will admit to ICU    0758 AST: (!) 91   0758 ALT: (!) 94   0759 NT-proBNP: (!) 468                         Initial Sepsis Screening     Row Name 12/14/18 0612                Is the patient's history suggestive of a new or worsening infection? (!)  Yes (Proceed)  -PE        Suspected source of infection pneumonia  -PE        Are two or more of the following signs & symptoms of infection both present and new to the patient? (!)  Yes (Proceed)  -PE        Indicate SIRS criteria Hyperthemia > 38 3C (100 9F); Tachycardia > 90 bpm;Leukocytosis (WBC > 73676 IJL)  -PE        If the answer is yes to both questions, suspicion of sepsis is present          If severe sepsis is present AND tissue hypoperfusion perists in the hour after fluid resuscitation or lactate > 4, the patient meets criteria for SEPTIC SHOCK          Are any of the following organ dysfunction criteria present within 6 hours of suspected infection and SIRS criteria that are NOT considered to be chronic conditions? (!)  Yes  -PE        Organ dysfunction          Date of presentation of severe sepsis          Time of presentation of severe sepsis          Tissue hypoperfusion persists in the hour after crystalloid fluid administration, evidenced, by either:          Was hypotension present within one hour of the conclusion of crystalloid fluid administration?           Date of presentation of septic shock          Time of presentation of septic shock            User Key  (r) = Recorded By, (t) = Taken By, (c) = Cosigned By    [address] Name Provider Type    TIGRE Sepulveda,  Physician                  MDM  The patient presented with a condition in which there was a high probability of imminent or life-threatening deterioration, and critical care services (excluding separately billable procedures) totalled 30-74 minutes  Disposition  Final diagnoses:   Fever   Vomiting   Respiratory distress   Hypoxia   Pneumonia   Sepsis ([facility] [address] )   Lennox-Gastaut syndrome with tonic seizures ([facility] [address] )   Hx of seizure disorder     Time reflects when diagnosis was documented in both MDM as applicable and the Disposition within this note     Time User Action Codes Description Comment    12/14/2018  7:21 AM Perez  Add [R50 9] Fever     12/14/2018  7:22 AM Perez  Add [R11 10] Vomiting     12/14/2018  7:22 AM Perez  Add [R06 03] Respiratory distress     12/14/2018  7:22 AM Perez  Add [R09 02] Hypoxia     12/14/2018  7:22 AM Perez  Add [J18 9] Pneumonia     12/14/2018  7:22 AM Perez  Add [A41 9] Sepsis ([facility] [address] )     12/14/2018  7:23 AM Perez  Add [G40 812] Lennox-Gastaut syndrome with tonic seizures ([facility] [address] )     12/14/2018  7:23 AM Perez  Add [Z86 69] Hx of seizure disorder       ED Disposition     ED Disposition Condition Comment    Admit  Case was discussed with Dr Christian Mayo and the patient's admission status was agreed to be Admission Status: inpatient status to the service of Dr Christian Mayo   Follow-up Information    None         Current Discharge Medication List      CONTINUE these medications which have NOT CHANGED    Details   bisacodyl (FLEET) 10 MG/30ML ENEM Insert 10 mg into the rectum as needed for constipation      enoxaparin (LOVENOX) 40 mg/0 4 mL Inject 0 4 mL (40 mg total) under the skin every 24 hours  Refills: 0    Comments:  For DVT Prophylaxis  Associated Diagnoses: Lennox-Gastaut syndrome with tonic seizures (HCC)      lactulose 20 g/30 mL 45 mL (30 g total) by Per G Tube route 3 (three) times a day  Refills: 0    Comments: Hold for diarrhea or loose stools  Associated Diagnoses: Lennox-Gastaut syndrome with tonic seizures (HCC)      levETIRAcetam (KEPPRA) 100 mg/mL oral solution 10 mL (1,000 mg total) by Per G Tube route every 12 (twelve) hours for 30 days  Qty: 600 mL, Refills: 5    Associated Diagnoses: Lennox-Gastaut syndrome with tonic seizures (HCC)      levOCARNitine (CARNITOR) 1 g/10 mL solution 7 5 mL (750 mg total) by Per G Tube route 3 (three) times a day  Qty: 474 mL, Refills: 7    Associated Diagnoses: Lennox-Gastaut syndrome with tonic seizures (HCC)      magnesium hydroxide (MILK OF MAGNESIA) 400 mg/5 mL oral suspension Take 30 mL by mouth daily as needed for constipation      MELATONIN PO 6 mg by Per G Tube route daily at bedtime      Multiple Vitamins-Minerals (CENTRUM SILVER PO) 5 mL/mL by Per G Tube route daily        ondansetron (ZOFRAN) 4 mg tablet 4 mg by Per G Tube route every 8 (eight) hours as needed for nausea or vomiting        perampanel (FYCOMPA) oral suspension 16 mL (8 mg total) by Per NG Tube route daily at bedtime Per G tube  Qty: 480 mL, Refills: 5    Associated Diagnoses: Lennox-Gastaut syndrome with tonic seizures (HCC)      PHENobarbital 20 mg/5 mL elixir Take 15 mL (60 mg total) by mouth daily at bedtime  Qty: 473 mL, Refills: 5    Associated Diagnoses: Lennox-Gastaut syndrome with tonic seizures (HCC)      Probiotic Product (ALEXANDER-BID PROBIOTIC PO) 1 tablet by Per G Tube route daily        rufinamide (BANZEL) 400 mg tablet 3 tablets (1,200 mg total) by Per G Tube route 2 (two) times a day  Qty: 180 tablet, Refills: 5    Associated Diagnoses: Lennox-Gastaut syndrome with tonic seizures (HCC)      Sennosides-Docusate Sodium (SENEXON-S PO) 1 tablet by Per G Tube route daily        topiramate (TOPAMAX) 200 MG tablet Take 1 tablet (200 mg total) by mouth every 12 (twelve) hours  Qty: 60 tablet, Refills: 5    Associated Diagnoses: Lennox-Gastaut syndrome with tonic seizures (HCC)      valproic acid (DEPAKENE) 250 MG/5ML soln 5 mL (250 mg total) by Per G Tube route every 8 (eight) hours  Qty: 450 mL, Refills: 5    Associated Diagnoses: Lennox-Gastaut syndrome with tonic seizures (HCC)      chlorhexidine (HIBICLENS) 4 % external liquid Apply 1 application topically daily  Qty: 120 mL, Refills: 0    Comments: Daily from the neck to the toes with bathing/showering for 2 weeks for mrsa decolonization  Associated Diagnoses: MRSA colonization           No discharge procedures on file      ED Provider  Electronically Signed by           Shirin Simons DO  12/14/18 0587

## 2018-12-14 NOTE — ASSESSMENT & PLAN NOTE
Chronic condition, resume medical regimen which is quite complex, nursing staff to confirm dosing with nursing home records

## 2018-12-14 NOTE — SOCIAL WORK
Pt was admitted from [facility]  Cm spoke with [address] in admissions dept at SNF who stated pt is a 15 day MA bedhold  Cm will follow and await pt to be medically ready for dc back to SNF

## 2018-12-14 NOTE — PROGRESS NOTES
Vancomycin Assessment    Shell Dias is a 41 y o  female who is currently receiving vancomycin 750mg Q12h for MRSA suspected, Pneumonia     Relevant clinical data and objective history reviewed:  Creatinine   Date Value Ref Range Status   12/14/2018 0 47 (L) 0 60 - 1 30 mg/dL Final     Comment:     Standardized to IDMS reference method   10/13/2018 0 51 (L) 0 60 - 1 30 mg/dL Final     Comment:     Standardized to IDMS reference method   10/12/2018 0 47 (L) 0 60 - 1 30 mg/dL Final     Comment:     Standardized to IDMS reference method   04/01/2015 0 36 (L) 0 60 - 1 30 mg/dL Final     Comment:     Standardized to IDMS reference method   02/02/2015 0 51 (L) 0 60 - 1 30 mg/dL Final     Comment:     Standardized to IDMS reference method   10/31/2014 0 50 (L) 0 60 - 1 30 mg/dL Final     Comment:     Standardized to IDMS reference method     Vancomycin Rm   Date Value Ref Range Status   10/17/2017 11 1 ug/mL Final     BP 99/66 (BP Location: Right arm)   Pulse (!) 107   Temp (!) 100 8 °F (38 2 °C) (Tympanic)   Resp 22   Wt 52 kg (114 lb 10 2 oz)   SpO2 98%   BMI 22 39 kg/m²   No intake/output data recorded  Lab Results   Component Value Date/Time    BUN 12 12/14/2018 06:59 AM    BUN 7 04/01/2015 09:35 PM    WBC 26 11 (H) 12/14/2018 06:44 AM    WBC 13 78 (H) 04/01/2015 09:35 PM    HGB 13 3 12/14/2018 06:44 AM    HGB 12 7 04/01/2015 09:35 PM    HCT 41 6 12/14/2018 06:44 AM    HCT 39 2 04/01/2015 09:35 PM     (H) 12/14/2018 06:44 AM     (H) 04/01/2015 09:35 PM     12/14/2018 06:44 AM     04/01/2015 09:35 PM     Temp Readings from Last 3 Encounters:   12/14/18 (!) 100 8 °F (38 2 °C) (Tympanic)   10/13/18 98 °F (36 7 °C) (Temporal)   09/18/18 98 5 °F (36 9 °C) (Temporal)     Vancomycin Days of Therapy: 1    Assessment/Plan  The patient is currently on vancomycin utilizing scheduled dosing based on actual body weight  Baseline risks associated with therapy include: dehydration    The patient is currently receiving 750mg Q12h and after clinical evaluation will be changed to 750mg Q8h  Pharmacy will also follow closely for s/sx of nephrotoxicity, infusion reactions and appropriateness of therapy  BMP and CBC will be ordered per protocol  Plan for trough as patient approaches steady state, prior to the 4th  dose at approximately 0730 on 12/15/18  Due to infection severity, will target a trough of 15-20 (appropriate for most indications)   Pharmacy will continue to follow the patients culture results and clinical progress daily      Prem Newton, Pharmacist

## 2018-12-14 NOTE — RESPIRATORY THERAPY NOTE
Vapotherm     Patient came in via ambulance, hypoxic, ABGs drawn on a non-rebreather mask and nasal oxygen at 6 lpm underneath   After the ABG we placed her on the Vapotherm

## 2018-12-15 ENCOUNTER — APPOINTMENT (INPATIENT)
Dept: RADIOLOGY | Facility: HOSPITAL | Age: 38
DRG: 871 | End: 2018-12-15
Payer: MEDICARE

## 2018-12-15 ENCOUNTER — ANESTHESIA (INPATIENT)
Dept: ICU | Facility: HOSPITAL | Age: 38
DRG: 871 | End: 2018-12-15
Payer: MEDICARE

## 2018-12-15 ENCOUNTER — APPOINTMENT (INPATIENT)
Dept: CT IMAGING | Facility: HOSPITAL | Age: 38
DRG: 871 | End: 2018-12-15
Payer: MEDICARE

## 2018-12-15 ENCOUNTER — ANESTHESIA EVENT (INPATIENT)
Dept: ICU | Facility: HOSPITAL | Age: 38
DRG: 871 | End: 2018-12-15
Payer: MEDICARE

## 2018-12-15 LAB
ALBUMIN SERPL BCP-MCNC: 1.5 G/DL (ref 3.5–5)
ALBUMIN SERPL BCP-MCNC: 1.6 G/DL (ref 3.5–5)
ALBUMIN SERPL BCP-MCNC: 1.7 G/DL (ref 3.5–5)
ALP SERPL-CCNC: 107 U/L (ref 46–116)
ALP SERPL-CCNC: 107 U/L (ref 46–116)
ALP SERPL-CCNC: 121 U/L (ref 46–116)
ALT SERPL W P-5'-P-CCNC: 70 U/L (ref 12–78)
ALT SERPL W P-5'-P-CCNC: 71 U/L (ref 12–78)
ALT SERPL W P-5'-P-CCNC: 77 U/L (ref 12–78)
ANION GAP SERPL CALCULATED.3IONS-SCNC: 13 MMOL/L (ref 4–13)
ANION GAP SERPL CALCULATED.3IONS-SCNC: 14 MMOL/L (ref 4–13)
ANION GAP SERPL CALCULATED.3IONS-SCNC: 7 MMOL/L (ref 4–13)
ARTERIAL PATENCY WRIST A: YES
ARTERIAL PATENCY WRIST A: YES
AST SERPL W P-5'-P-CCNC: 70 U/L (ref 5–45)
AST SERPL W P-5'-P-CCNC: 77 U/L (ref 5–45)
AST SERPL W P-5'-P-CCNC: 80 U/L (ref 5–45)
BASE EXCESS BLDA CALC-SCNC: -6.9 MMOL/L
BASE EXCESS BLDA CALC-SCNC: -9.4 MMOL/L
BASOPHILS # BLD AUTO: 0.07 THOUSANDS/ΜL (ref 0–0.1)
BASOPHILS # BLD MANUAL: 0 THOUSAND/UL (ref 0–0.1)
BASOPHILS NFR BLD AUTO: 0 % (ref 0–1)
BASOPHILS NFR MAR MANUAL: 0 % (ref 0–1)
BILIRUB SERPL-MCNC: 0.8 MG/DL (ref 0.2–1)
BILIRUB SERPL-MCNC: 0.8 MG/DL (ref 0.2–1)
BILIRUB SERPL-MCNC: 0.9 MG/DL (ref 0.2–1)
BUN SERPL-MCNC: 3 MG/DL (ref 5–25)
BUN SERPL-MCNC: 4 MG/DL (ref 5–25)
BUN SERPL-MCNC: 5 MG/DL (ref 5–25)
CALCIUM SERPL-MCNC: 7.6 MG/DL (ref 8.3–10.1)
CALCIUM SERPL-MCNC: 7.7 MG/DL (ref 8.3–10.1)
CALCIUM SERPL-MCNC: 7.8 MG/DL (ref 8.3–10.1)
CHLORIDE SERPL-SCNC: 111 MMOL/L (ref 100–108)
CHLORIDE SERPL-SCNC: 113 MMOL/L (ref 100–108)
CHLORIDE SERPL-SCNC: 113 MMOL/L (ref 100–108)
CO2 SERPL-SCNC: 16 MMOL/L (ref 21–32)
CO2 SERPL-SCNC: 20 MMOL/L (ref 21–32)
CO2 SERPL-SCNC: 22 MMOL/L (ref 21–32)
CREAT SERPL-MCNC: 0.31 MG/DL (ref 0.6–1.3)
CREAT SERPL-MCNC: 0.32 MG/DL (ref 0.6–1.3)
CREAT SERPL-MCNC: 0.52 MG/DL (ref 0.6–1.3)
EOSINOPHIL # BLD AUTO: 0.01 THOUSAND/ΜL (ref 0–0.61)
EOSINOPHIL # BLD MANUAL: 0 THOUSAND/UL (ref 0–0.4)
EOSINOPHIL NFR BLD AUTO: 0 % (ref 0–6)
EOSINOPHIL NFR BLD MANUAL: 0 % (ref 0–6)
ERYTHROCYTE [DISTWIDTH] IN BLOOD BY AUTOMATED COUNT: 14.1 % (ref 11.6–15.1)
ERYTHROCYTE [DISTWIDTH] IN BLOOD BY AUTOMATED COUNT: 14.3 % (ref 11.6–15.1)
GFR SERPL CREATININE-BSD FRML MDRD: 122 ML/MIN/1.73SQ M
GFR SERPL CREATININE-BSD FRML MDRD: 144 ML/MIN/1.73SQ M
GFR SERPL CREATININE-BSD FRML MDRD: 145 ML/MIN/1.73SQ M
GLUCOSE SERPL-MCNC: 112 MG/DL (ref 65–140)
GLUCOSE SERPL-MCNC: 128 MG/DL (ref 65–140)
GLUCOSE SERPL-MCNC: 146 MG/DL (ref 65–140)
HCG SERPL QL: NEGATIVE
HCO3 BLDA-SCNC: 15.8 MMOL/L (ref 22–28)
HCO3 BLDA-SCNC: 17 MMOL/L (ref 22–28)
HCT VFR BLD AUTO: 35.5 % (ref 34.8–46.1)
HCT VFR BLD AUTO: 36.9 % (ref 34.8–46.1)
HFNC FLOW LPM: 25
HGB BLD-MCNC: 11.3 G/DL (ref 11.5–15.4)
HGB BLD-MCNC: 11.8 G/DL (ref 11.5–15.4)
IMM GRANULOCYTES # BLD AUTO: 0.16 THOUSAND/UL (ref 0–0.2)
IMM GRANULOCYTES NFR BLD AUTO: 1 % (ref 0–2)
INR PPP: 1.57 (ref 0.86–1.17)
LACTATE SERPL-SCNC: 2 MMOL/L (ref 0.5–2)
LACTATE SERPL-SCNC: 2.4 MMOL/L (ref 0.5–2)
LACTATE SERPL-SCNC: 3.2 MMOL/L (ref 0.5–2)
LACTATE SERPL-SCNC: 3.2 MMOL/L (ref 0.5–2)
LACTATE SERPL-SCNC: 3.5 MMOL/L (ref 0.5–2)
LACTATE SERPL-SCNC: 3.7 MMOL/L (ref 0.5–2)
LACTATE SERPL-SCNC: 3.9 MMOL/L (ref 0.5–2)
LACTATE SERPL-SCNC: 6 MMOL/L (ref 0.5–2)
LYMPHOCYTES # BLD AUTO: 1.72 THOUSAND/UL (ref 0.6–4.47)
LYMPHOCYTES # BLD AUTO: 2.24 THOUSANDS/ΜL (ref 0.6–4.47)
LYMPHOCYTES # BLD AUTO: 7 % (ref 14–44)
LYMPHOCYTES NFR BLD AUTO: 10 % (ref 14–44)
MAGNESIUM SERPL-MCNC: 1.3 MG/DL (ref 1.6–2.6)
MCH RBC QN AUTO: 33.7 PG (ref 26.8–34.3)
MCH RBC QN AUTO: 33.9 PG (ref 26.8–34.3)
MCHC RBC AUTO-ENTMCNC: 31.8 G/DL (ref 31.4–37.4)
MCHC RBC AUTO-ENTMCNC: 32 G/DL (ref 31.4–37.4)
MCV RBC AUTO: 106 FL (ref 82–98)
MCV RBC AUTO: 106 FL (ref 82–98)
MONOCYTES # BLD AUTO: 0.98 THOUSAND/UL (ref 0–1.22)
MONOCYTES # BLD AUTO: 1.47 THOUSAND/ΜL (ref 0.17–1.22)
MONOCYTES NFR BLD AUTO: 6 % (ref 4–12)
MONOCYTES NFR BLD: 4 % (ref 4–12)
NEUTROPHILS # BLD AUTO: 19.12 THOUSANDS/ΜL (ref 1.85–7.62)
NEUTROPHILS # BLD MANUAL: 21.91 THOUSAND/UL (ref 1.85–7.62)
NEUTS BAND NFR BLD MANUAL: 5 % (ref 0–8)
NEUTS SEG NFR BLD AUTO: 83 % (ref 43–75)
NEUTS SEG NFR BLD AUTO: 84 % (ref 43–75)
NON VENT HFNC FIO2: 40
NON VENT TYPE HFNC: ABNORMAL
NRBC BLD AUTO-RTO: 0 /100 WBCS
NRBC BLD AUTO-RTO: 0 /100 WBCS
O2 CT BLDA-SCNC: 17.2 ML/DL (ref 16–23)
O2 CT BLDA-SCNC: 17.2 ML/DL (ref 16–23)
OXYHGB MFR BLDA: 97.4 % (ref 94–97)
OXYHGB MFR BLDA: 98.1 % (ref 94–97)
PCO2 BLDA: 29.5 MM HG (ref 36–44)
PCO2 BLDA: 32.6 MM HG (ref 36–44)
PH BLDA: 7.3 [PH] (ref 7.35–7.45)
PH BLDA: 7.38 [PH] (ref 7.35–7.45)
PHOSPHATE SERPL-MCNC: 1.9 MG/DL (ref 2.7–4.5)
PLATELET # BLD AUTO: 219 THOUSANDS/UL (ref 149–390)
PLATELET # BLD AUTO: 231 THOUSANDS/UL (ref 149–390)
PLATELET BLD QL SMEAR: ADEQUATE
PMV BLD AUTO: 10.3 FL (ref 8.9–12.7)
PMV BLD AUTO: 10.9 FL (ref 8.9–12.7)
PO2 BLDA: 128.1 MM HG (ref 75–129)
PO2 BLDA: 163.4 MM HG (ref 75–129)
POTASSIUM SERPL-SCNC: 3 MMOL/L (ref 3.5–5.3)
POTASSIUM SERPL-SCNC: 3.8 MMOL/L (ref 3.5–5.3)
POTASSIUM SERPL-SCNC: 4 MMOL/L (ref 3.5–5.3)
PROCALCITONIN SERPL-MCNC: 4.75 NG/ML
PROCALCITONIN SERPL-MCNC: 5.34 NG/ML
PROT SERPL-MCNC: 4.7 G/DL (ref 6.4–8.2)
PROT SERPL-MCNC: 5.1 G/DL (ref 6.4–8.2)
PROT SERPL-MCNC: 5.2 G/DL (ref 6.4–8.2)
PROTHROMBIN TIME: 18 SECONDS (ref 11.8–14.2)
RBC # BLD AUTO: 3.35 MILLION/UL (ref 3.81–5.12)
RBC # BLD AUTO: 3.48 MILLION/UL (ref 3.81–5.12)
SODIUM SERPL-SCNC: 141 MMOL/L (ref 136–145)
SODIUM SERPL-SCNC: 142 MMOL/L (ref 136–145)
SODIUM SERPL-SCNC: 146 MMOL/L (ref 136–145)
SPECIMEN SOURCE: ABNORMAL
SPECIMEN SOURCE: ABNORMAL
TOTAL CELLS COUNTED SPEC: 100
VANCOMYCIN TROUGH SERPL-MCNC: 10 UG/ML (ref 10–20)
WBC # BLD AUTO: 23.07 THOUSAND/UL (ref 4.31–10.16)
WBC # BLD AUTO: 24.62 THOUSAND/UL (ref 4.31–10.16)

## 2018-12-15 PROCEDURE — 94660 CPAP INITIATION&MGMT: CPT

## 2018-12-15 PROCEDURE — 80202 ASSAY OF VANCOMYCIN: CPT | Performed by: INTERNAL MEDICINE

## 2018-12-15 PROCEDURE — 83605 ASSAY OF LACTIC ACID: CPT | Performed by: PHYSICIAN ASSISTANT

## 2018-12-15 PROCEDURE — 74177 CT ABD & PELVIS W/CONTRAST: CPT

## 2018-12-15 PROCEDURE — 80053 COMPREHEN METABOLIC PANEL: CPT | Performed by: PHYSICIAN ASSISTANT

## 2018-12-15 PROCEDURE — 83735 ASSAY OF MAGNESIUM: CPT | Performed by: PHYSICIAN ASSISTANT

## 2018-12-15 PROCEDURE — 85007 BL SMEAR W/DIFF WBC COUNT: CPT | Performed by: INTERNAL MEDICINE

## 2018-12-15 PROCEDURE — 82805 BLOOD GASES W/O2 SATURATION: CPT | Performed by: PHYSICIAN ASSISTANT

## 2018-12-15 PROCEDURE — 84100 ASSAY OF PHOSPHORUS: CPT | Performed by: PHYSICIAN ASSISTANT

## 2018-12-15 PROCEDURE — 94664 DEMO&/EVAL PT USE INHALER: CPT

## 2018-12-15 PROCEDURE — 82805 BLOOD GASES W/O2 SATURATION: CPT | Performed by: INTERNAL MEDICINE

## 2018-12-15 PROCEDURE — 71045 X-RAY EXAM CHEST 1 VIEW: CPT

## 2018-12-15 PROCEDURE — 84145 PROCALCITONIN (PCT): CPT | Performed by: INTERNAL MEDICINE

## 2018-12-15 PROCEDURE — 84703 CHORIONIC GONADOTROPIN ASSAY: CPT | Performed by: INTERNAL MEDICINE

## 2018-12-15 PROCEDURE — 80053 COMPREHEN METABOLIC PANEL: CPT | Performed by: INTERNAL MEDICINE

## 2018-12-15 PROCEDURE — 94669 MECHANICAL CHEST WALL OSCILL: CPT

## 2018-12-15 PROCEDURE — 71260 CT THORAX DX C+: CPT

## 2018-12-15 PROCEDURE — 94760 N-INVAS EAR/PLS OXIMETRY 1: CPT

## 2018-12-15 PROCEDURE — 85025 COMPLETE CBC W/AUTO DIFF WBC: CPT | Performed by: PHYSICIAN ASSISTANT

## 2018-12-15 PROCEDURE — 85027 COMPLETE CBC AUTOMATED: CPT | Performed by: INTERNAL MEDICINE

## 2018-12-15 PROCEDURE — 99291 CRITICAL CARE FIRST HOUR: CPT | Performed by: INTERNAL MEDICINE

## 2018-12-15 PROCEDURE — 5A1945Z RESPIRATORY VENTILATION, 24-96 CONSECUTIVE HOURS: ICD-10-PCS | Performed by: INTERNAL MEDICINE

## 2018-12-15 PROCEDURE — 85610 PROTHROMBIN TIME: CPT | Performed by: INTERNAL MEDICINE

## 2018-12-15 PROCEDURE — 94002 VENT MGMT INPAT INIT DAY: CPT

## 2018-12-15 PROCEDURE — 36556 INSERT NON-TUNNEL CV CATH: CPT | Performed by: PHYSICIAN ASSISTANT

## 2018-12-15 PROCEDURE — 36600 WITHDRAWAL OF ARTERIAL BLOOD: CPT

## 2018-12-15 PROCEDURE — 94640 AIRWAY INHALATION TREATMENT: CPT

## 2018-12-15 RX ORDER — ALBUTEROL SULFATE 2.5 MG/3ML
2.5 SOLUTION RESPIRATORY (INHALATION) EVERY 4 HOURS PRN
Status: DISCONTINUED | OUTPATIENT
Start: 2018-12-15 | End: 2018-12-19 | Stop reason: HOSPADM

## 2018-12-15 RX ORDER — VECURONIUM BROMIDE 1 MG/ML
INJECTION, POWDER, LYOPHILIZED, FOR SOLUTION INTRAVENOUS AS NEEDED
Status: DISCONTINUED | OUTPATIENT
Start: 2018-12-15 | End: 2018-12-18 | Stop reason: HOSPADM

## 2018-12-15 RX ORDER — PROPOFOL 10 MG/ML
INJECTION, EMULSION INTRAVENOUS AS NEEDED
Status: DISCONTINUED | OUTPATIENT
Start: 2018-12-15 | End: 2018-12-18 | Stop reason: HOSPADM

## 2018-12-15 RX ORDER — CHLORHEXIDINE GLUCONATE 0.12 MG/ML
15 RINSE ORAL EVERY 12 HOURS SCHEDULED
Status: DISCONTINUED | OUTPATIENT
Start: 2018-12-15 | End: 2018-12-19 | Stop reason: HOSPADM

## 2018-12-15 RX ORDER — SODIUM CHLORIDE FOR INHALATION 0.9 %
3 VIAL, NEBULIZER (ML) INHALATION
Status: DISCONTINUED | OUTPATIENT
Start: 2018-12-16 | End: 2018-12-19 | Stop reason: HOSPADM

## 2018-12-15 RX ORDER — ASCORBIC ACID 500 MG/ML
1500 INJECTION, SOLUTION INTRAMUSCULAR; INTRAVENOUS; SUBCUTANEOUS EVERY 6 HOURS
Status: DISCONTINUED | OUTPATIENT
Start: 2018-12-15 | End: 2018-12-15 | Stop reason: SDUPTHER

## 2018-12-15 RX ORDER — POTASSIUM CHLORIDE 14.9 MG/ML
40 INJECTION INTRAVENOUS ONCE
Status: COMPLETED | OUTPATIENT
Start: 2018-12-15 | End: 2018-12-15

## 2018-12-15 RX ORDER — LEVALBUTEROL 1.25 MG/.5ML
1.25 SOLUTION, CONCENTRATE RESPIRATORY (INHALATION)
Status: DISCONTINUED | OUTPATIENT
Start: 2018-12-16 | End: 2018-12-19 | Stop reason: HOSPADM

## 2018-12-15 RX ORDER — MAGNESIUM SULFATE HEPTAHYDRATE 40 MG/ML
2 INJECTION, SOLUTION INTRAVENOUS ONCE
Status: COMPLETED | OUTPATIENT
Start: 2018-12-15 | End: 2018-12-15

## 2018-12-15 RX ADMIN — ASCORBIC ACID 1500 MG: 500 INJECTION, SOLUTION INTRAMUSCULAR; INTRAVENOUS; SUBCUTANEOUS at 12:31

## 2018-12-15 RX ADMIN — HYDROCORTISONE SODIUM SUCCINATE 50 MG: 100 INJECTION, POWDER, FOR SOLUTION INTRAMUSCULAR; INTRAVENOUS at 04:14

## 2018-12-15 RX ADMIN — VALPROIC ACID 250 MG: 500 SOLUTION ORAL at 16:59

## 2018-12-15 RX ADMIN — LEVOCARNITINE 750 MG: 1 SOLUTION ORAL at 12:10

## 2018-12-15 RX ADMIN — TOPIRAMATE 200 MG: 100 TABLET, FILM COATED ORAL at 12:15

## 2018-12-15 RX ADMIN — IOHEXOL 100 ML: 350 INJECTION, SOLUTION INTRAVENOUS at 02:22

## 2018-12-15 RX ADMIN — VALPROIC ACID 250 MG: 500 SOLUTION ORAL at 06:25

## 2018-12-15 RX ADMIN — VECURONIUM BROMIDE 5 MG: 1 INJECTION, POWDER, LYOPHILIZED, FOR SOLUTION INTRAVENOUS at 09:43

## 2018-12-15 RX ADMIN — CEFEPIME HYDROCHLORIDE 2000 MG: 2 INJECTION, POWDER, FOR SOLUTION INTRAVENOUS at 14:04

## 2018-12-15 RX ADMIN — PHENOBARBITAL 60 MG: 20 LIQUID ORAL at 21:45

## 2018-12-15 RX ADMIN — TOPIRAMATE 200 MG: 100 TABLET, FILM COATED ORAL at 21:44

## 2018-12-15 RX ADMIN — METRONIDAZOLE 500 MG: 500 INJECTION, SOLUTION INTRAVENOUS at 06:23

## 2018-12-15 RX ADMIN — VANCOMYCIN HYDROCHLORIDE 1250 MG: 500 INJECTION, POWDER, LYOPHILIZED, FOR SOLUTION INTRAVENOUS at 21:42

## 2018-12-15 RX ADMIN — CHLORHEXIDINE GLUCONATE 0.12% ORAL RINSE 15 ML: 1.2 LIQUID ORAL at 14:10

## 2018-12-15 RX ADMIN — CEFEPIME HYDROCHLORIDE 2000 MG: 2 INJECTION, POWDER, FOR SOLUTION INTRAVENOUS at 23:49

## 2018-12-15 RX ADMIN — SODIUM CHLORIDE 1000 ML: 0.9 INJECTION, SOLUTION INTRAVENOUS at 08:17

## 2018-12-15 RX ADMIN — Medication 0.4 MCG/KG/HR: at 23:45

## 2018-12-15 RX ADMIN — POTASSIUM CHLORIDE 40 MEQ: 200 INJECTION, SOLUTION INTRAVENOUS at 01:40

## 2018-12-15 RX ADMIN — LEVETIRACETAM 1000 MG: 100 SOLUTION ORAL at 21:46

## 2018-12-15 RX ADMIN — VANCOMYCIN HYDROCHLORIDE 750 MG: 750 INJECTION, POWDER, LYOPHILIZED, FOR SOLUTION INTRAVENOUS at 00:36

## 2018-12-15 RX ADMIN — PROPOFOL 200 MG: 10 INJECTION, EMULSION INTRAVENOUS at 09:42

## 2018-12-15 RX ADMIN — RUFINAMIDE 1200 MG: 200 TABLET, FILM COATED ORAL at 12:12

## 2018-12-15 RX ADMIN — MAGNESIUM SULFATE IN WATER 2 G: 40 INJECTION, SOLUTION INTRAVENOUS at 11:52

## 2018-12-15 RX ADMIN — ASCORBIC ACID 1500 MG: 500 INJECTION, SOLUTION INTRAMUSCULAR; INTRAVENOUS; SUBCUTANEOUS at 04:27

## 2018-12-15 RX ADMIN — PERAMPANEL 8 MG: 4 TABLET ORAL at 21:44

## 2018-12-15 RX ADMIN — LEVOCARNITINE 750 MG: 1 SOLUTION ORAL at 21:45

## 2018-12-15 RX ADMIN — ALBUTEROL SULFATE 2.5 MG: 2.5 SOLUTION RESPIRATORY (INHALATION) at 10:10

## 2018-12-15 RX ADMIN — LACTULOSE 30 G: 10 SOLUTION ORAL at 16:58

## 2018-12-15 RX ADMIN — CHLORHEXIDINE GLUCONATE 0.12% ORAL RINSE 15 ML: 1.2 LIQUID ORAL at 21:44

## 2018-12-15 RX ADMIN — ASCORBIC ACID 1500 MG: 500 INJECTION, SOLUTION INTRAMUSCULAR; INTRAVENOUS; SUBCUTANEOUS at 23:00

## 2018-12-15 RX ADMIN — SODIUM CHLORIDE 50 ML/HR: 0.9 INJECTION, SOLUTION INTRAVENOUS at 11:41

## 2018-12-15 RX ADMIN — ENOXAPARIN SODIUM 40 MG: 40 INJECTION SUBCUTANEOUS at 11:42

## 2018-12-15 RX ADMIN — HYDROCORTISONE SODIUM SUCCINATE 50 MG: 100 INJECTION, POWDER, FOR SOLUTION INTRAMUSCULAR; INTRAVENOUS at 16:54

## 2018-12-15 RX ADMIN — RUFINAMIDE 1200 MG: 200 TABLET, FILM COATED ORAL at 17:54

## 2018-12-15 RX ADMIN — HYDROCORTISONE SODIUM SUCCINATE 50 MG: 100 INJECTION, POWDER, FOR SOLUTION INTRAMUSCULAR; INTRAVENOUS at 23:00

## 2018-12-15 RX ADMIN — POTASSIUM PHOSPHATE, MONOBASIC AND POTASSIUM PHOSPHATE, DIBASIC 15 MMOL: 224; 236 INJECTION, SOLUTION INTRAVENOUS at 11:06

## 2018-12-15 RX ADMIN — METRONIDAZOLE 500 MG: 500 INJECTION, SOLUTION INTRAVENOUS at 23:00

## 2018-12-15 RX ADMIN — LEVOCARNITINE 750 MG: 1 SOLUTION ORAL at 17:01

## 2018-12-15 RX ADMIN — LACTULOSE 30 G: 10 SOLUTION ORAL at 21:44

## 2018-12-15 RX ADMIN — MELATONIN TAB 3 MG 6 MG: 3 TAB at 21:44

## 2018-12-15 RX ADMIN — THIAMINE HYDROCHLORIDE 200 MG: 100 INJECTION, SOLUTION INTRAMUSCULAR; INTRAVENOUS at 23:00

## 2018-12-15 RX ADMIN — SODIUM CHLORIDE 1000 ML: 0.9 INJECTION, SOLUTION INTRAVENOUS at 00:36

## 2018-12-15 RX ADMIN — LACTULOSE 30 G: 10 SOLUTION ORAL at 12:23

## 2018-12-15 RX ADMIN — VANCOMYCIN HYDROCHLORIDE 1250 MG: 500 INJECTION, POWDER, LYOPHILIZED, FOR SOLUTION INTRAVENOUS at 15:33

## 2018-12-15 RX ADMIN — VALPROIC ACID 250 MG: 500 SOLUTION ORAL at 23:00

## 2018-12-15 RX ADMIN — METRONIDAZOLE 500 MG: 500 INJECTION, SOLUTION INTRAVENOUS at 14:39

## 2018-12-15 RX ADMIN — VANCOMYCIN HYDROCHLORIDE 750 MG: 750 INJECTION, POWDER, LYOPHILIZED, FOR SOLUTION INTRAVENOUS at 08:16

## 2018-12-15 RX ADMIN — LEVETIRACETAM 1000 MG: 100 SOLUTION ORAL at 12:09

## 2018-12-15 RX ADMIN — Medication 0.1 MCG/KG/HR: at 14:15

## 2018-12-15 RX ADMIN — THIAMINE HYDROCHLORIDE 200 MG: 100 INJECTION, SOLUTION INTRAMUSCULAR; INTRAVENOUS at 11:02

## 2018-12-15 RX ADMIN — HYDROCORTISONE SODIUM SUCCINATE 50 MG: 100 INJECTION, POWDER, FOR SOLUTION INTRAMUSCULAR; INTRAVENOUS at 11:43

## 2018-12-15 RX ADMIN — ASCORBIC ACID 1500 MG: 500 INJECTION, SOLUTION INTRAMUSCULAR; INTRAVENOUS; SUBCUTANEOUS at 17:03

## 2018-12-15 NOTE — PROGRESS NOTES
Patient lactic acid increased from 5 2 to 6 0, WBC count from 26 11 to 26 90, patient received total of 7500 ml NSS prior to last set of lab studies, patient on cefepime and vancomycin  Discussed ascorbic acid protocol with on-call attending, requested to discuss with critical care attending in [place]  Case dicussed with Dr Rozina Jose, agreed with ascorbic acid protocol  CT C/A/P ordered and obtained  Total white out of left lung  Ascorbic Acid ordered and delivered from [facility]  Patient did receive initial dosing of ascorbic acid 1 5 Gm, solu-cortef 50 mg, and Thiamine 200 mg

## 2018-12-15 NOTE — ASSESSMENT & PLAN NOTE
Severe sepsis, present on admission,  secondary to pneumonia, suspected aspiration pneumonia, patient also with history of MRSA colonization, will continue broad-spectrum antibiotics cefepime, vancomycin, Flagyl today is day 2 of antibiotic therapy  Follow up cultures  Patient developed increased respiratory rate overnight, increased work of breathing, CT of the chest shows left pleural effusion, essentially left lung is ryley out  Patient intubated today for improved airway protection as well as for increased work of breathing with abnormal CT as noted  Monitor respiratory status  Will start tube feeds

## 2018-12-15 NOTE — PLAN OF CARE
Problem: DISCHARGE PLANNING - CARE MANAGEMENT  Goal: Discharge to post-acute care or home with appropriate resources  INTERVENTIONS:  - Conduct assessment to determine patient/family and health care team treatment goals, and need for post-acute services based on payer coverage, community resources, and patient preferences, and barriers to discharge  - Address psychosocial, clinical, and financial barriers to discharge as identified in assessment in conjunction with the patient/family and health care team  - Arrange appropriate level of post-acute services according to patient's   needs and preference and payer coverage in collaboration with the physician and health care team  - Communicate with and update the patient/family, physician, and health care team regarding progress on the discharge plan  - Arrange appropriate transportation to post-acute venues    Pt in icu need to reassess d/c plan  Outcome: Progressing

## 2018-12-15 NOTE — PROGRESS NOTES
Vancomycin Assessment    Orlinda Curling is a 41 y o  female who is currently receiving vancomycin 750mg q8h for MRSA suspected, Pneumonia     Relevant clinical data and objective history reviewed:  Creatinine   Date Value Ref Range Status   12/15/2018 0 31 (L) 0 60 - 1 30 mg/dL Final     Comment:     Standardized to IDMS reference method   12/14/2018 0 52 (L) 0 60 - 1 30 mg/dL Final     Comment:     Standardized to IDMS reference method   12/14/2018 0 47 (L) 0 60 - 1 30 mg/dL Final     Comment:     Standardized to IDMS reference method   04/01/2015 0 36 (L) 0 60 - 1 30 mg/dL Final     Comment:     Standardized to IDMS reference method   02/02/2015 0 51 (L) 0 60 - 1 30 mg/dL Final     Comment:     Standardized to IDMS reference method   10/31/2014 0 50 (L) 0 60 - 1 30 mg/dL Final     Comment:     Standardized to IDMS reference method     Vancomycin Rm   Date Value Ref Range Status   10/17/2017 11 1 ug/mL Final     BP (!) 86/53 (BP Location: Left arm)   Pulse 88   Temp (!) 97 3 °F (36 3 °C) (Temporal)   Resp (!) 24   Ht 5' (1 524 m)   Wt 53 7 kg (118 lb 6 2 oz)   SpO2 97%   BMI 23 12 kg/m²   I/O last 3 completed shifts: In: 1421 7 [I V :171 7; IV Piggyback:1250]  Out: 400 [Urine:400]  Lab Results   Component Value Date/Time    BUN 3 (L) 12/15/2018 06:45 AM    BUN 7 04/01/2015 09:35 PM    WBC 23 07 (H) 12/15/2018 06:45 AM    WBC 13 78 (H) 04/01/2015 09:35 PM    HGB 11 3 (L) 12/15/2018 06:45 AM    HGB 12 7 04/01/2015 09:35 PM    HCT 35 5 12/15/2018 06:45 AM    HCT 39 2 04/01/2015 09:35 PM     (H) 12/15/2018 06:45 AM     (H) 04/01/2015 09:35 PM     12/15/2018 06:45 AM     04/01/2015 09:35 PM     Temp Readings from Last 3 Encounters:   12/15/18 (!) 97 3 °F (36 3 °C) (Temporal)   10/13/18 98 °F (36 7 °C) (Temporal)   09/18/18 98 5 °F (36 9 °C) (Temporal)     Vancomycin Days of Therapy: 2    Assessment/Plan  The patient is currently on vancomycin utilizing scheduled dosing    Baseline risks associated with therapy include: dehydration  The patient is receiving 750mg q8h with the most recent vancomycin level being at steady-state and sub-therapeutic based on a goal of 15-20 (appropriate for most indications) ; therefore, after clinical evaluation will be changed to 1250mg q8h   Pharmacy will continue to follow closely for s/sx of nephrotoxicity, infusion reactions and appropriateness of therapy  BMP and CBC will be ordered per protocol  Plan for trough as patient approaches steady state, prior to the 4th  dose at approximately 1230 on 12/16/18  Pharmacy will continue to follow the patients culture results and clinical progress daily      Charla Hood, Pharmacist

## 2018-12-15 NOTE — ASSESSMENT & PLAN NOTE
Patient is reported to have significant hypoxia by EMS, oxygen level corrected with the application of oxygen, patient developed increasing work of breathing despite Vapotherm, patient intubated today 12/15/2018

## 2018-12-15 NOTE — ASSESSMENT & PLAN NOTE
Acute metabolic encephalopathy persists, secondary to severe sepsis, patient history chronic intractable epilepsy

## 2018-12-15 NOTE — ANESTHESIA PROCEDURE NOTES
Emergent Intubation  Date/Time: 12/15/2018 9:56 AM  Difficult airway (small mouth)    General Information and Staff    Patient location during procedure: ICU  Resident/CRNA: MELLY KEARNS    Indications and Patient Condition  Indications for airway management: respiratory distress and pulmonary toilet  Spontaneous ventilation: present  Preoxygenated: yes  MILS not maintained throughout  Mask difficulty assessment: 1 - vent by mask    Final Airway Details  Final airway type: endotracheal airway      Successful airway: ETT and cuffed  Cuffed: yes   Successful intubation technique: video laryngoscopy ([address])  Facilitating devices/methods: intubating stylet  Endotracheal tube insertion site: oral  Blade type: evly Grath    Blade size: #3  ETT size: 6 5 mm  Cormack-Lehane Classification: grade I - full view of glottis  Placement verified by: chest auscultation, capnometry and radiography   Measured from: lips  ETT to lips (cm): 22  Number of attempts at approach: 2  Ventilation between attempts: BVM  Number of other approaches attempted: 0    Additional Comments  Small mouth

## 2018-12-15 NOTE — PLAN OF CARE
Problem: NEUROSENSORY - ADULT  Goal: Achieves stable or improved neurological status  INTERVENTIONS  - Monitor and report changes in neurological status  - Initiate measures to prevent increased intracranial pressure  - Maintain blood pressure and fluid volume within ordered parameters to optimize cerebral perfusion  - Monitor temperature, glucose, and sodium or any other associated labs   Initiate appropriate interventions as ordered  - Monitor for seizure activity   - Administer anti-seizure medications as ordered   Outcome: Progressing    Goal: Absence of seizures  INTERVENTIONS  - Monitor for seizure activity  - Administer anti-seizure medications as ordered  - Monitor neurological status   Outcome: Progressing    Goal: Remains free of injury related to seizures activity  INTERVENTIONS  - Maintain airway, patient safety  and administer oxygen as ordered  - Monitor patient for seizure activity, document and report duration and description of seizure to physician/advanced practitioner  - If seizure occurs,  ensure patient safety during seizure  - Reorient patient post seizure  - Seizure pads on all 4 side rails  - Instruct patient/family to notify RN of any seizure activity including if an aura is experienced  - Instruct patient/family to call for assistance with activity based on nursing assessment  - Administer anti-seizure medications as ordered  - Monitor fetal well being   Outcome: Progressing    Goal: Achieves maximal functionality and self care  INTERVENTIONS  - Monitor swallowing and airway patency with patient fatigue and changes in neurological status  - Encourage and assist patient to increase activity and self care with guidance from rehab services  - Encourage visually impaired, hearing impaired and aphasic patients to use assistive/communication devices   Outcome: Not Progressing      Problem: CARDIOVASCULAR - ADULT  Goal: Maintains optimal cardiac output and hemodynamic stability  INTERVENTIONS:  - Monitor I/O, vital signs and rhythm  - Monitor for S/S and trends of decreased cardiac output i e  bleeding, hypotension  - Administer and titrate ordered vasoactive medications to optimize hemodynamic stability  - Assess quality of pulses, skin color and temperature  - Assess for signs of decreased coronary artery perfusion - ex   Angina  - Instruct patient to report change in severity of symptoms   Outcome: Progressing    Goal: Absence of cardiac dysrhythmias or at baseline rhythm  INTERVENTIONS:  - Continuous cardiac monitoring, monitor vital signs, obtain 12 lead EKG if indicated  - Administer antiarrhythmic and heart rate control medications as ordered  - Monitor electrolytes and administer replacement therapy as ordered   Outcome: Progressing      Problem: RESPIRATORY - ADULT  Goal: Achieves optimal ventilation and oxygenation  INTERVENTIONS:  - Assess for changes in respiratory status  - Assess for changes in mentation and behavior  - Position to facilitate oxygenation and minimize respiratory effort  - Oxygen administration by appropriate delivery method based on oxygen saturation (per order) or ABGs  - Initiate smoking cessation education as indicated  - Encourage broncho-pulmonary hygiene including cough, deep breathe, Incentive Spirometry  - Assess the need for suctioning and aspirate as needed  - Assess and instruct to report SOB or any respiratory difficulty  - Respiratory Therapy support as indicated   Outcome: Not Progressing      Problem: GASTROINTESTINAL - ADULT  Goal: Minimal or absence of nausea and/or vomiting  INTERVENTIONS:  - Administer IV fluids as ordered to ensure adequate hydration  - Maintain NPO status until nausea and vomiting are resolved  - Nasogastric tube as ordered  - Administer ordered antiemetic medications as needed  - Provide nonpharmacologic comfort measures as appropriate  - Advance diet as tolerated, if ordered  - Nutrition services referral to assist patient with adequate nutrition and appropriate food choices   Outcome: Completed Date Met: 12/15/18    Goal: Maintains or returns to baseline bowel function  INTERVENTIONS:  - Assess bowel function  - Encourage oral fluids to ensure adequate hydration  - Administer IV fluids as ordered to ensure adequate hydration  - Administer ordered medications as needed  - Encourage mobilization and activity  - Nutrition services referral to assist patient with appropriate food choices   Outcome: Not Progressing    Goal: Maintains adequate nutritional intake  INTERVENTIONS:  - Monitor percentage of each meal consumed  - Identify factors contributing to decreased intake, treat as appropriate  - Assist with meals as needed  - Monitor I&O, WT and lab values  - Obtain nutrition services referral as needed   Outcome: Progressing      Problem: GENITOURINARY - ADULT  Goal: Maintains or returns to baseline urinary function  INTERVENTIONS:  - Assess urinary function  - Encourage oral fluids to ensure adequate hydration  - Administer IV fluids as ordered to ensure adequate hydration  - Administer ordered medications as needed  - Offer frequent toileting  - Follow urinary retention protocol if ordered    Outcome: Not Progressing    Goal: Absence of urinary retention  INTERVENTIONS:  - Assess patients ability to void and empty bladder  - Monitor I/O  - Bladder scan as needed  - Discuss with physician/AP medications to alleviate retention as needed  - Discuss catheterization for long term situations as appropriate    Outcome: Not Progressing    Goal: Urinary catheter remains patent  INTERVENTIONS:  - Assess patency of urinary catheter  - If patient has a chronic nguyen, consider changing catheter if non-functioning  - Follow guidelines for intermittent irrigation of non-functioning urinary catheter   Outcome: Progressing      Problem: METABOLIC, FLUID AND ELECTROLYTES - ADULT  Goal: Electrolytes maintained within normal limits  INTERVENTIONS:  - Monitor labs and assess patient for signs and symptoms of electrolyte imbalances  - Administer electrolyte replacement as ordered  - Monitor response to electrolyte replacements, including repeat lab results as appropriate  - Instruct patient on fluid and nutrition as appropriate   Outcome: Progressing    Goal: Fluid balance maintained  INTERVENTIONS:  - Monitor labs and assess for signs and symptoms of volume excess or deficit  - Monitor I/O and WT  - Instruct patient on fluid and nutrition as appropriate   Outcome: Progressing    Goal: Glucose maintained within target range  INTERVENTIONS:  - Monitor Blood Glucose as ordered  - Assess for signs and symptoms of hyperglycemia and hypoglycemia  - Administer ordered medications to maintain glucose within target range  - Assess nutritional intake and initiate nutrition service referral as needed   Outcome: Progressing      Problem: SKIN/TISSUE INTEGRITY - ADULT  Goal: Skin integrity remains intact  INTERVENTIONS  - Identify patients at risk for skin breakdown  - Assess and monitor skin integrity  - Assess and monitor nutrition and hydration status  - Monitor labs (i e  albumin)  - Assess for incontinence   - Turn and reposition patient  - Assist with mobility/ambulation  - Relieve pressure over bony prominences  - Avoid friction and shearing  - Provide appropriate hygiene as needed including keeping skin clean and dry  - Evaluate need for skin moisturizer/barrier cream  - Collaborate with interdisciplinary team (i e  Nutrition, Rehabilitation, etc )   - Patient/family teaching   Outcome: Progressing    Goal: Incision(s), wounds(s) or drain site(s) healing without S/S of infection  INTERVENTIONS  - Assess and document risk factors for skin impairment   - Assess and document dressing, incision, wound bed, drain sites and surrounding tissue  - Initiate Nutrition services consult and/or wound management as needed   Outcome: Progressing    Goal: Oral mucous membranes remain intact  INTERVENTIONS  - Assess oral mucosa and hygiene practices  - Implement preventative oral hygiene regimen  - Implement oral medicated treatments as ordered  - Initiate Nutrition services referral as needed   Outcome: Progressing      Problem: HEMATOLOGIC - ADULT  Goal: Maintains hematologic stability  INTERVENTIONS  - Assess for signs and symptoms of bleeding or hemorrhage  - Monitor labs  - Administer supportive blood products/factors as ordered and appropriate   Outcome: Progressing      Problem: MUSCULOSKELETAL - ADULT  Goal: Maintain or return mobility to safest level of function  INTERVENTIONS:  - Assess patient's ability to carry out ADLs; assess patient's baseline for ADL function and identify physical deficits which impact ability to perform ADLs (bathing, care of mouth/teeth, toileting, grooming, dressing, etc )  - Assess/evaluate cause of self-care deficits   - Assess range of motion  - Assess patient's mobility; develop plan if impaired  - Assess patient's need for assistive devices and provide as appropriate  - Encourage maximum independence but intervene and supervise when necessary  - Involve family in performance of ADLs  - Assess for home care needs following discharge   - Request OT consult to assist with ADL evaluation and planning for discharge  - Provide patient education as appropriate   Outcome: Not Progressing    Goal: Maintain proper alignment of affected body part  INTERVENTIONS:  - Support, maintain and protect limb and body alignment  - Provide pt/fam with appropriate education   Outcome: Progressing      Problem: Prexisting or High Potential for Compromised Skin Integrity  Goal: Skin integrity is maintained or improved  INTERVENTIONS:  - Identify patients at risk for skin breakdown  - Assess and monitor skin integrity  - Assess and monitor nutrition and hydration status  - Monitor labs (i e  albumin)  - Assess for incontinence   - Turn and reposition patient  - Assist with mobility/ambulation  - Relieve pressure over bony prominences  - Avoid friction and shearing  - Provide appropriate hygiene as needed including keeping skin clean and dry  - Evaluate need for skin moisturizer/barrier cream  - Collaborate with interdisciplinary team (i e  Nutrition, Rehabilitation, etc )   - Patient/family teaching   Outcome: Progressing      Problem: SAFETY,RESTRAINT: NV/NON-SELF DESTRUCTIVE BEHAVIOR  Goal: Remains free of harm/injury (restraint for non violent/non self-detsructive behavior)  INTERVENTIONS:  - Instruct patient/family regarding restraint use   - Assess and monitor physiologic and psychological status   - Provide interventions and comfort measures to meet assessed patient needs   - Identify and implement measures to help patient regain control  - Assess readiness for release of restraint    Outcome: Progressing    Goal: Returns to optimal restraint-free functioning  INTERVENTIONS:  - Assess the patient's behavior and symptoms that indicate continued need for restraint  - Identify and implement measures to help patient regain control  - Assess readiness for release of restraint    Outcome: Progressing      Problem: Nutrition/Hydration-ADULT  Goal: Nutrient/Hydration intake appropriate for improving, restoring or maintaining nutritional needs  Monitor and assess patient's nutrition/hydration status for malnutrition (ex- brittle hair, bruises, dry skin, pale skin and conjunctiva, muscle wasting, smooth red tongue, and disorientation)  Collaborate with interdisciplinary team and initiate plan and interventions as ordered  Monitor patient's weight and dietary intake as ordered or per policy  Utilize nutrition screening tool and intervene per policy  Determine patient's food preferences and provide high-protein, high-caloric foods as appropriate       INTERVENTIONS:  - Monitor oral intake, urinary output, labs, and treatment plans  - Assess nutrition and hydration status and recommend course of action  - Evaluate amount of meals eaten  - Assist patient with eating if necessary   - Allow adequate time for meals  - Recommend/ encourage appropriate diets, oral nutritional supplements, and vitamin/mineral supplements  - Order, calculate, and assess calorie counts as needed  - Recommend, monitor, and adjust tube feedings and TPN/PPN based on assessed needs  - Assess need for intravenous fluids  - Provide specific nutrition/hydration education as appropriate  - Include patient/family/caregiver in decisions related to nutrition   Outcome: Progressing      Problem: DISCHARGE PLANNING - CARE MANAGEMENT  Goal: Discharge to post-acute care or home with appropriate resources  INTERVENTIONS:  - Conduct assessment to determine patient/family and health care team treatment goals, and need for post-acute services based on payer coverage, community resources, and patient preferences, and barriers to discharge  - Address psychosocial, clinical, and financial barriers to discharge as identified in assessment in conjunction with the patient/family and health care team  - Arrange appropriate level of post-acute services according to patient's   needs and preference and payer coverage in collaboration with the physician and health care team  - Communicate with and update the patient/family, physician, and health care team regarding progress on the discharge plan  - Arrange appropriate transportation to post-acute venues    Pt in icu need to reassess d/c plan   Outcome: Progressing      Problem: Potential for Falls  Goal: Patient will remain free of falls  INTERVENTIONS:  - Assess patient frequently for physical needs  -  Identify cognitive and physical deficits and behaviors that affect risk of falls    -  [place] fall precautions as indicated by assessment   - Educate patient/family on patient safety including physical limitations  - Instruct patient to call for assistance with activity based on assessment  - Modify environment to reduce risk of injury  - Consider OT/PT consult to assist with strengthening/mobility   Outcome: Progressing

## 2018-12-15 NOTE — PROGRESS NOTES
Progress Note - Teresita Sos 1980, 41 y o  female MRN: [id number]    Unit/Bed#:  Encounter: [id number]    Primary Care Provider: Jessie Montgomery DO   Date and time admitted to hospital: 12/14/2018  5:44 AM        * Severe sepsis [facility])   Assessment & Plan    Severe sepsis, present on admission,  secondary to pneumonia, suspected aspiration pneumonia, patient also with history of MRSA colonization, will continue broad-spectrum antibiotics cefepime, vancomycin, Flagyl today is day 2 of antibiotic therapy  Follow up cultures  Patient developed increased respiratory rate overnight, increased work of breathing, CT of the chest shows left pleural effusion, essentially left lung is ryley out  Patient intubated today for improved airway protection as well as for increased work of breathing with abnormal CT as noted  Monitor respiratory status  Will start tube feeds  Aspiration pneumonia ([facility] [address] )   Assessment & Plan    Suspected aspiration pneumonia, possible gram-negative pneumonia, healthcare associated/possible Staph aureus pneumonia    Procalcitonin level was elevated     Acute respiratory failure with hypoxia [facility])   Assessment & Plan    Patient is reported to have significant hypoxia by EMS, oxygen level corrected with the application of oxygen, patient developed increasing work of breathing despite Vapotherm, patient intubated today 12/15/2018  Intractable epilepsy without status epilepticus ([facility] [address] )   Assessment & Plan    Chronic condition, continue multiple medications patient complex anti epileptic drug regimen     Acute encephalopathy   Assessment & Plan    Acute metabolic encephalopathy persists, secondary to severe sepsis, patient history chronic intractable epilepsy       Lactic acidosis   Assessment & Plan    Follow up repeat lactic acid level     Hyperammonemia (HCC)   Assessment & Plan    Chronic condition, continue lactulose     Underweight   Assessment & Plan    Resume tube feeds     Transaminitis   Assessment & Plan    Chronic         VTE Pharmacologic Prophylaxis:   Pharmacologic: Enoxaparin (Lovenox)  Mechanical VTE Prophylaxis in Place: Yes    Patient Centered Rounds: I have performed bedside rounds with nursing staff today  Discussions with Specialists or Other Care Team Provider:  Case discussed with overnight advanced practitioner, case discussed with nursing staff at bedside, respiratory care team and nurse anesthetist     Education and Discussions with Family / Patient:  Patient's father was updated today via telephone    Time Spent for Care: Total time spent today 60 min, 35 min critical care time spent on ventilator management    Current Length of Stay: 1 day(s)    Current Patient Status: Inpatient   Certification Statement: The patient will continue to require additional inpatient hospital stay due to Acute respiratory failure    Discharge Plan / Estimated Discharge Date: To be determined      Code Status: Level 1 - Full Code      Subjective: Increased work of breathing overnight    Objective:     Vitals:   Temp (24hrs), Av 9 °F (37 2 °C), Min:97 °F (36 1 °C), Max:100 8 °F (38 2 °C)    Temp:  [97 °F (36 1 °C)-100 8 °F (38 2 °C)] 97 3 °F (36 3 °C)  HR:  [] 88  Resp:  [20-41] 24  BP: ()/(51-70) 86/53  SpO2:  [83 %-98 %] 97 %  Body mass index is 23 12 kg/m²  Input and Output Summary (last 24 hours): Intake/Output Summary (Last 24 hours) at 12/15/18 1037  Last data filed at 12/15/18 3171   Gross per 24 hour   Intake          2721 67 ml   Output              400 ml   Net          2321 67 ml       Physical Exam:     Physical Exam   Constitutional: She is oriented to person, place, and time  No distress  Eyes: Pupils are equal, round, and reactive to light  No scleral icterus  Pulmonary/Chest:   Increased work of breathing   Abdominal: Soft  Bowel sounds are normal  She exhibits no distension  There is no tenderness     Neurological: She is alert and oriented to person, place, and time  No cranial nerve deficit  Coordination normal    Skin: Skin is warm  No rash noted  She is diaphoretic  No erythema  No pallor  Psychiatric: She has a normal mood and affect  Her behavior is normal  Judgment and thought content normal    Nursing note and vitals reviewed  Additional Data:     Labs:      Results from last 7 days  Lab Units 12/15/18  0645   WBC Thousand/uL 23 07*   HEMOGLOBIN g/dL 11 3*   HEMATOCRIT % 35 5   PLATELETS Thousands/uL 219   NEUTROS PCT % 83*   LYMPHS PCT % 10*   MONOS PCT % 6   EOS PCT % 0       Results from last 7 days  Lab Units 12/15/18  0645  12/14/18  1244   POTASSIUM mmol/L 3 8  < >  --    CHLORIDE mmol/L 113*  < >  --    CO2 mmol/L 22  < >  --    CO2, I-STAT mmol/L  --   --  19*   BUN mg/dL 3*  < >  --    CREATININE mg/dL 0 31*  < >  --    CALCIUM mg/dL 7 6*  < >  --    ALK PHOS U/L 107  < >  --    ALT U/L 70  < >  --    AST U/L 70*  < >  --    GLUCOSE, ISTAT mg/dl  --   --  123   < > = values in this interval not displayed  Results from last 7 days  Lab Units 12/15/18  0645   INR  1 57*       * I Have Reviewed All Lab Data Listed Above  * Additional Pertinent Lab Tests Reviewed: Maddie 67 Admission Reviewed    Imaging:    Imaging Reports Reviewed Today Include:  CT chest   IMPRESSION:     Minimal aeration of the left lung with atelectasis and/or consolidations  Large left-sided pleural effusion  Abrupt cut off of the left may stem bronchus which may be due to mucous plugging however other etiology such as obstructing mass cannot be   ruled out  Multiple airspace opacities in the right lung  Findings may related to infection  Follow-up to resolution is recommended  Multiple new pulmonary nodules in the right lung measuring up to 5 mm  Recommend short-term follow-up CT scan in 3-6 months  Gastrostomy tube tip cannot be adequately evaluated due to underdistended stomach    Recommend clinical correlation for positioning of gastrostomy tube    Imaging Personally Reviewed by Myself Includes:  CT chest was personally reviewed, post intubation chest x-ray personally reviewed, shows ET tube in satisfactory position        Recent Cultures (last 7 days):       Results from last 7 days  Lab Units 12/14/18  0749   INFLUENZA B PCR  None Detected   RSV PCR  None Detected       Last 24 Hours Medication List:     Current Facility-Administered Medications:  albuterol 2 5 mg Nebulization Q4H PRN Michell Loza MD    ascorbic acid in sodium chloride 0 9% 100 mL IVPB 1,500 mg Intravenous Q6H Yun Amaya PA-C Last Rate: 1,500 mg (12/15/18 0427)   cefepime 2,000 mg Intravenous Q12H Michell Loza MD Last Rate: 2,000 mg (12/14/18 2338)   enoxaparin 40 mg Subcutaneous Q24H [facility] Rohan, DO    hydrocortisone sodium succinate 50 mg Intravenous Q6H [facility] Rey Barron PA-C    lactulose 30 g Per G Tube TID Marden Rohan, DO    levETIRAcetam 1,000 mg Per G Tube Q12H [facility] Rohan, DO    levOCARNitine 750 mg Per G Tube TID Marden Rohan, DO    magnesium sulfate 2 g Intravenous Once Marden Rohan, DO    melatonin 6 mg Per G Tube HS Marden Rohan, DO    metroNIDAZOLE 500 mg Intravenous [facility] Marden Rohan, DO Last Rate: 500 mg (12/15/18 2884)   ondansetron 4 mg Intravenous Q4H PRN Marden Rohan, DO    Perampanel 8 mg Per NG Tube HS Marden Rohan, DO    PHENobarbital 60 mg Oral HS Marden Rohan, DO    potassium phosphate 15 mmol Intravenous Once Marden Rohan, DO    rufinamide 1,200 mg Per G Tube BID Michell Loza MD    sodium chloride 50 mL/hr Intravenous Continuous Marden Rohan, DO Last Rate: 100 mL/hr (12/14/18 2221)   thiamine 200 mg Intravenous Q12H Yun Amaya PA-C    topiramate 200 mg Oral Q12H [facility] Marden Rohan, DO    valproic acid 250 mg Per G Tube Q8H Marden Rohan, DO    vancomycin 1,250 mg Intravenous Q8H Valdemar Rohan, DO      Facility-Administered Medications Ordered in Other Encounters:  propofol Intravenous PRN Юлия Keith CRNA   vecuronium   PRN Юлия Keith CRNA        Today, Patient Was Seen By: Torin Bean, DO

## 2018-12-15 NOTE — ASSESSMENT & PLAN NOTE
Suspected aspiration pneumonia, possible gram-negative pneumonia, healthcare associated/possible Staph aureus pneumonia    Procalcitonin level was elevated

## 2018-12-16 PROBLEM — J90 PLEURAL EFFUSION: Status: ACTIVE | Noted: 2018-12-16

## 2018-12-16 LAB
ALBUMIN SERPL BCP-MCNC: 1.3 G/DL (ref 3.5–5)
ALBUMIN SERPL BCP-MCNC: 1.4 G/DL (ref 3.5–5)
ALBUMIN SERPL BCP-MCNC: 1.7 G/DL (ref 3.5–5)
ALP SERPL-CCNC: 110 U/L (ref 46–116)
ALP SERPL-CCNC: 117 U/L (ref 46–116)
ALP SERPL-CCNC: 133 U/L (ref 46–116)
ALT SERPL W P-5'-P-CCNC: 54 U/L (ref 12–78)
ALT SERPL W P-5'-P-CCNC: 56 U/L (ref 12–78)
ALT SERPL W P-5'-P-CCNC: 58 U/L (ref 12–78)
AMMONIA PLAS-SCNC: 28 UMOL/L (ref 11–35)
ANION GAP SERPL CALCULATED.3IONS-SCNC: 10 MMOL/L (ref 4–13)
ANION GAP SERPL CALCULATED.3IONS-SCNC: 12 MMOL/L (ref 4–13)
ANION GAP SERPL CALCULATED.3IONS-SCNC: 14 MMOL/L (ref 4–13)
ARTERIAL PATENCY WRIST A: YES
AST SERPL W P-5'-P-CCNC: 40 U/L (ref 5–45)
AST SERPL W P-5'-P-CCNC: 43 U/L (ref 5–45)
AST SERPL W P-5'-P-CCNC: 49 U/L (ref 5–45)
BASE EXCESS BLDA CALC-SCNC: -6.7 MMOL/L
BASOPHILS # BLD AUTO: 0.03 THOUSANDS/ΜL (ref 0–0.1)
BASOPHILS # BLD AUTO: 0.04 THOUSANDS/ΜL (ref 0–0.1)
BASOPHILS NFR BLD AUTO: 0 % (ref 0–1)
BASOPHILS NFR BLD AUTO: 0 % (ref 0–1)
BILIRUB SERPL-MCNC: 0.5 MG/DL (ref 0.2–1)
BILIRUB SERPL-MCNC: 0.5 MG/DL (ref 0.2–1)
BILIRUB SERPL-MCNC: 0.6 MG/DL (ref 0.2–1)
BUN SERPL-MCNC: 10 MG/DL (ref 5–25)
BUN SERPL-MCNC: 12 MG/DL (ref 5–25)
BUN SERPL-MCNC: 9 MG/DL (ref 5–25)
CALCIUM SERPL-MCNC: 6.7 MG/DL (ref 8.3–10.1)
CALCIUM SERPL-MCNC: 6.8 MG/DL (ref 8.3–10.1)
CALCIUM SERPL-MCNC: 7.4 MG/DL (ref 8.3–10.1)
CHLORIDE SERPL-SCNC: 114 MMOL/L (ref 100–108)
CHLORIDE SERPL-SCNC: 116 MMOL/L (ref 100–108)
CHLORIDE SERPL-SCNC: 117 MMOL/L (ref 100–108)
CO2 SERPL-SCNC: 18 MMOL/L (ref 21–32)
CO2 SERPL-SCNC: 18 MMOL/L (ref 21–32)
CO2 SERPL-SCNC: 21 MMOL/L (ref 21–32)
CREAT SERPL-MCNC: 0.38 MG/DL (ref 0.6–1.3)
CREAT SERPL-MCNC: 0.43 MG/DL (ref 0.6–1.3)
CREAT SERPL-MCNC: 0.48 MG/DL (ref 0.6–1.3)
EOSINOPHIL # BLD AUTO: 0 THOUSAND/ΜL (ref 0–0.61)
EOSINOPHIL # BLD AUTO: 0 THOUSAND/ΜL (ref 0–0.61)
EOSINOPHIL NFR BLD AUTO: 0 % (ref 0–6)
EOSINOPHIL NFR BLD AUTO: 0 % (ref 0–6)
ERYTHROCYTE [DISTWIDTH] IN BLOOD BY AUTOMATED COUNT: 14 % (ref 11.6–15.1)
ERYTHROCYTE [DISTWIDTH] IN BLOOD BY AUTOMATED COUNT: 14.1 % (ref 11.6–15.1)
GFR SERPL CREATININE-BSD FRML MDRD: 126 ML/MIN/1.73SQ M
GFR SERPL CREATININE-BSD FRML MDRD: 130 ML/MIN/1.73SQ M
GFR SERPL CREATININE-BSD FRML MDRD: 136 ML/MIN/1.73SQ M
GLUCOSE SERPL-MCNC: 169 MG/DL (ref 65–140)
GLUCOSE SERPL-MCNC: 220 MG/DL (ref 65–140)
GLUCOSE SERPL-MCNC: 248 MG/DL (ref 65–140)
HCO3 BLDA-SCNC: 17.4 MMOL/L (ref 22–28)
HCT VFR BLD AUTO: 29.8 % (ref 34.8–46.1)
HCT VFR BLD AUTO: 30.7 % (ref 34.8–46.1)
HGB BLD-MCNC: 10.2 G/DL (ref 11.5–15.4)
HGB BLD-MCNC: 9.8 G/DL (ref 11.5–15.4)
IMM GRANULOCYTES # BLD AUTO: 0.19 THOUSAND/UL (ref 0–0.2)
IMM GRANULOCYTES # BLD AUTO: 0.22 THOUSAND/UL (ref 0–0.2)
IMM GRANULOCYTES NFR BLD AUTO: 1 % (ref 0–2)
IMM GRANULOCYTES NFR BLD AUTO: 1 % (ref 0–2)
INR PPP: 1.44 (ref 0.86–1.17)
LACTATE SERPL-SCNC: 2.5 MMOL/L (ref 0.5–2)
LACTATE SERPL-SCNC: 2.6 MMOL/L (ref 0.5–2)
LACTATE SERPL-SCNC: 2.8 MMOL/L (ref 0.5–2)
LACTATE SERPL-SCNC: 3.3 MMOL/L (ref 0.5–2)
LACTATE SERPL-SCNC: 3.6 MMOL/L (ref 0.5–2)
LACTATE SERPL-SCNC: 3.8 MMOL/L (ref 0.5–2)
LACTATE SERPL-SCNC: 4.2 MMOL/L (ref 0.5–2)
LACTATE SERPL-SCNC: 4.5 MMOL/L (ref 0.5–2)
LYMPHOCYTES # BLD AUTO: 1.44 THOUSANDS/ΜL (ref 0.6–4.47)
LYMPHOCYTES # BLD AUTO: 1.52 THOUSANDS/ΜL (ref 0.6–4.47)
LYMPHOCYTES NFR BLD AUTO: 7 % (ref 14–44)
LYMPHOCYTES NFR BLD AUTO: 7 % (ref 14–44)
MAGNESIUM SERPL-MCNC: 1.4 MG/DL (ref 1.6–2.6)
MAGNESIUM SERPL-MCNC: 2.4 MG/DL (ref 1.6–2.6)
MCH RBC QN AUTO: 34 PG (ref 26.8–34.3)
MCH RBC QN AUTO: 34.1 PG (ref 26.8–34.3)
MCHC RBC AUTO-ENTMCNC: 32.9 G/DL (ref 31.4–37.4)
MCHC RBC AUTO-ENTMCNC: 33.2 G/DL (ref 31.4–37.4)
MCV RBC AUTO: 103 FL (ref 82–98)
MCV RBC AUTO: 104 FL (ref 82–98)
MONOCYTES # BLD AUTO: 1.13 THOUSAND/ΜL (ref 0.17–1.22)
MONOCYTES # BLD AUTO: 1.16 THOUSAND/ΜL (ref 0.17–1.22)
MONOCYTES NFR BLD AUTO: 5 % (ref 4–12)
MONOCYTES NFR BLD AUTO: 5 % (ref 4–12)
NEUTROPHILS # BLD AUTO: 18.51 THOUSANDS/ΜL (ref 1.85–7.62)
NEUTROPHILS # BLD AUTO: 19.45 THOUSANDS/ΜL (ref 1.85–7.62)
NEUTS SEG NFR BLD AUTO: 87 % (ref 43–75)
NEUTS SEG NFR BLD AUTO: 87 % (ref 43–75)
NRBC BLD AUTO-RTO: 0 /100 WBCS
NRBC BLD AUTO-RTO: 0 /100 WBCS
O2 CT BLDA-SCNC: 14.5 ML/DL (ref 16–23)
OXYHGB MFR BLDA: 95 % (ref 94–97)
PCO2 BLDA: 29.9 MM HG (ref 36–44)
PH BLDA: 7.38 [PH] (ref 7.35–7.45)
PHENOBARB SERPL-MCNC: 13 UG/ML (ref 15–40)
PHOSPHATE SERPL-MCNC: 1.5 MG/DL (ref 2.7–4.5)
PHOSPHATE SERPL-MCNC: 1.8 MG/DL (ref 2.7–4.5)
PLATELET # BLD AUTO: 208 THOUSANDS/UL (ref 149–390)
PLATELET # BLD AUTO: 225 THOUSANDS/UL (ref 149–390)
PMV BLD AUTO: 10.9 FL (ref 8.9–12.7)
PMV BLD AUTO: 10.9 FL (ref 8.9–12.7)
PO2 BLDA: 77.4 MM HG (ref 75–129)
POTASSIUM SERPL-SCNC: 2.4 MMOL/L (ref 3.5–5.3)
POTASSIUM SERPL-SCNC: 2.4 MMOL/L (ref 3.5–5.3)
POTASSIUM SERPL-SCNC: 3.7 MMOL/L (ref 3.5–5.3)
PROCALCITONIN SERPL-MCNC: 4.21 NG/ML
PROT SERPL-MCNC: 4.1 G/DL (ref 6.4–8.2)
PROT SERPL-MCNC: 4.3 G/DL (ref 6.4–8.2)
PROT SERPL-MCNC: 4.9 G/DL (ref 6.4–8.2)
PROTHROMBIN TIME: 16.8 SECONDS (ref 11.8–14.2)
RBC # BLD AUTO: 2.88 MILLION/UL (ref 3.81–5.12)
RBC # BLD AUTO: 2.99 MILLION/UL (ref 3.81–5.12)
SODIUM SERPL-SCNC: 145 MMOL/L (ref 136–145)
SODIUM SERPL-SCNC: 147 MMOL/L (ref 136–145)
SODIUM SERPL-SCNC: 148 MMOL/L (ref 136–145)
SPECIMEN SOURCE: ABNORMAL
TROPONIN I SERPL-MCNC: 0.02 NG/ML
TROPONIN I SERPL-MCNC: <0.02 NG/ML
VALPROATE SERPL-MCNC: 25 UG/ML (ref 50–100)
VANCOMYCIN TROUGH SERPL-MCNC: 23.9 UG/ML (ref 10–20)
WBC # BLD AUTO: 21.37 THOUSAND/UL (ref 4.31–10.16)
WBC # BLD AUTO: 22.32 THOUSAND/UL (ref 4.31–10.16)

## 2018-12-16 PROCEDURE — 94150 VITAL CAPACITY TEST: CPT

## 2018-12-16 PROCEDURE — 82805 BLOOD GASES W/O2 SATURATION: CPT | Performed by: INTERNAL MEDICINE

## 2018-12-16 PROCEDURE — 80202 ASSAY OF VANCOMYCIN: CPT | Performed by: INTERNAL MEDICINE

## 2018-12-16 PROCEDURE — 94760 N-INVAS EAR/PLS OXIMETRY 1: CPT

## 2018-12-16 PROCEDURE — 80184 ASSAY OF PHENOBARBITAL: CPT | Performed by: INTERNAL MEDICINE

## 2018-12-16 PROCEDURE — 83605 ASSAY OF LACTIC ACID: CPT | Performed by: PHYSICIAN ASSISTANT

## 2018-12-16 PROCEDURE — 02HV33Z INSERTION OF INFUSION DEVICE INTO SUPERIOR VENA CAVA, PERCUTANEOUS APPROACH: ICD-10-PCS | Performed by: INTERNAL MEDICINE

## 2018-12-16 PROCEDURE — 0BH17EZ INSERTION OF ENDOTRACHEAL AIRWAY INTO TRACHEA, VIA NATURAL OR ARTIFICIAL OPENING: ICD-10-PCS | Performed by: NURSE ANESTHETIST, CERTIFIED REGISTERED

## 2018-12-16 PROCEDURE — 84100 ASSAY OF PHOSPHORUS: CPT | Performed by: PHYSICIAN ASSISTANT

## 2018-12-16 PROCEDURE — 80053 COMPREHEN METABOLIC PANEL: CPT | Performed by: PHYSICIAN ASSISTANT

## 2018-12-16 PROCEDURE — 94003 VENT MGMT INPAT SUBQ DAY: CPT

## 2018-12-16 PROCEDURE — 99291 CRITICAL CARE FIRST HOUR: CPT | Performed by: INTERNAL MEDICINE

## 2018-12-16 PROCEDURE — 36600 WITHDRAWAL OF ARTERIAL BLOOD: CPT

## 2018-12-16 PROCEDURE — 82140 ASSAY OF AMMONIA: CPT | Performed by: PHYSICIAN ASSISTANT

## 2018-12-16 PROCEDURE — 94640 AIRWAY INHALATION TREATMENT: CPT

## 2018-12-16 PROCEDURE — 80177 DRUG SCRN QUAN LEVETIRACETAM: CPT | Performed by: INTERNAL MEDICINE

## 2018-12-16 PROCEDURE — 83735 ASSAY OF MAGNESIUM: CPT | Performed by: NURSE PRACTITIONER

## 2018-12-16 PROCEDURE — 84145 PROCALCITONIN (PCT): CPT | Performed by: INTERNAL MEDICINE

## 2018-12-16 PROCEDURE — 83735 ASSAY OF MAGNESIUM: CPT | Performed by: PHYSICIAN ASSISTANT

## 2018-12-16 PROCEDURE — 80053 COMPREHEN METABOLIC PANEL: CPT | Performed by: NURSE PRACTITIONER

## 2018-12-16 PROCEDURE — 85025 COMPLETE CBC W/AUTO DIFF WBC: CPT | Performed by: PHYSICIAN ASSISTANT

## 2018-12-16 PROCEDURE — 84484 ASSAY OF TROPONIN QUANT: CPT | Performed by: NURSE PRACTITIONER

## 2018-12-16 PROCEDURE — 80164 ASSAY DIPROPYLACETIC ACD TOT: CPT | Performed by: INTERNAL MEDICINE

## 2018-12-16 PROCEDURE — 93005 ELECTROCARDIOGRAM TRACING: CPT

## 2018-12-16 PROCEDURE — 85610 PROTHROMBIN TIME: CPT | Performed by: NURSE PRACTITIONER

## 2018-12-16 PROCEDURE — 84100 ASSAY OF PHOSPHORUS: CPT | Performed by: NURSE PRACTITIONER

## 2018-12-16 RX ORDER — MAGNESIUM SULFATE HEPTAHYDRATE 40 MG/ML
2 INJECTION, SOLUTION INTRAVENOUS ONCE
Status: COMPLETED | OUTPATIENT
Start: 2018-12-16 | End: 2018-12-16

## 2018-12-16 RX ORDER — FENTANYL CITRATE 50 UG/ML
25 INJECTION, SOLUTION INTRAMUSCULAR; INTRAVENOUS ONCE
Status: COMPLETED | OUTPATIENT
Start: 2018-12-16 | End: 2018-12-16

## 2018-12-16 RX ORDER — ASPIRIN 81 MG/1
324 TABLET, CHEWABLE ORAL DAILY
Status: DISCONTINUED | OUTPATIENT
Start: 2018-12-17 | End: 2018-12-16

## 2018-12-16 RX ORDER — POTASSIUM CHLORIDE 14.9 MG/ML
20 INJECTION INTRAVENOUS
Status: COMPLETED | OUTPATIENT
Start: 2018-12-16 | End: 2018-12-16

## 2018-12-16 RX ORDER — POTASSIUM CHLORIDE 14.9 MG/ML
20 INJECTION INTRAVENOUS
Status: COMPLETED | OUTPATIENT
Start: 2018-12-16 | End: 2018-12-17

## 2018-12-16 RX ORDER — ALBUMIN, HUMAN INJ 5% 5 %
12.5 SOLUTION INTRAVENOUS ONCE
Status: COMPLETED | OUTPATIENT
Start: 2018-12-16 | End: 2018-12-16

## 2018-12-16 RX ADMIN — LACTULOSE 30 G: 10 SOLUTION ORAL at 08:46

## 2018-12-16 RX ADMIN — LEVALBUTEROL 1.25 MG: 1.25 SOLUTION, CONCENTRATE RESPIRATORY (INHALATION) at 22:37

## 2018-12-16 RX ADMIN — VALPROIC ACID 250 MG: 500 SOLUTION ORAL at 06:31

## 2018-12-16 RX ADMIN — POTASSIUM PHOSPHATE, MONOBASIC AND POTASSIUM PHOSPHATE, DIBASIC 30 MMOL: 224; 236 INJECTION, SOLUTION INTRAVENOUS at 09:29

## 2018-12-16 RX ADMIN — ALBUTEROL SULFATE 2.5 MG: 2.5 SOLUTION RESPIRATORY (INHALATION) at 18:24

## 2018-12-16 RX ADMIN — HYDROCORTISONE SODIUM SUCCINATE 50 MG: 100 INJECTION, POWDER, FOR SOLUTION INTRAMUSCULAR; INTRAVENOUS at 04:11

## 2018-12-16 RX ADMIN — CHLORHEXIDINE GLUCONATE 0.12% ORAL RINSE 15 ML: 1.2 LIQUID ORAL at 21:58

## 2018-12-16 RX ADMIN — ALBUMIN HUMAN 12.5 G: 0.05 INJECTION, SOLUTION INTRAVENOUS at 07:04

## 2018-12-16 RX ADMIN — PERAMPANEL 8 MG: 4 TABLET ORAL at 22:07

## 2018-12-16 RX ADMIN — SODIUM CHLORIDE 500 ML: 0.9 INJECTION, SOLUTION INTRAVENOUS at 04:04

## 2018-12-16 RX ADMIN — METRONIDAZOLE 500 MG: 500 INJECTION, SOLUTION INTRAVENOUS at 22:47

## 2018-12-16 RX ADMIN — ISODIUM CHLORIDE 3 ML: 0.03 SOLUTION RESPIRATORY (INHALATION) at 22:37

## 2018-12-16 RX ADMIN — PHENOBARBITAL 60 MG: 20 LIQUID ORAL at 22:01

## 2018-12-16 RX ADMIN — POTASSIUM CHLORIDE 20 MEQ: 200 INJECTION, SOLUTION INTRAVENOUS at 04:00

## 2018-12-16 RX ADMIN — LACTULOSE 30 G: 10 SOLUTION ORAL at 21:58

## 2018-12-16 RX ADMIN — CEFEPIME HYDROCHLORIDE 2000 MG: 2 INJECTION, POWDER, FOR SOLUTION INTRAVENOUS at 12:54

## 2018-12-16 RX ADMIN — POTASSIUM CHLORIDE 20 MEQ: 200 INJECTION, SOLUTION INTRAVENOUS at 05:01

## 2018-12-16 RX ADMIN — METRONIDAZOLE 500 MG: 500 INJECTION, SOLUTION INTRAVENOUS at 13:39

## 2018-12-16 RX ADMIN — ISODIUM CHLORIDE 3 ML: 0.03 SOLUTION RESPIRATORY (INHALATION) at 08:06

## 2018-12-16 RX ADMIN — ENOXAPARIN SODIUM 40 MG: 40 INJECTION SUBCUTANEOUS at 08:45

## 2018-12-16 RX ADMIN — CHLORHEXIDINE GLUCONATE 0.12% ORAL RINSE 15 ML: 1.2 LIQUID ORAL at 08:44

## 2018-12-16 RX ADMIN — VANCOMYCIN HYDROCHLORIDE 1250 MG: 500 INJECTION, POWDER, LYOPHILIZED, FOR SOLUTION INTRAVENOUS at 05:05

## 2018-12-16 RX ADMIN — POTASSIUM CHLORIDE 20 MEQ: 200 INJECTION, SOLUTION INTRAVENOUS at 02:45

## 2018-12-16 RX ADMIN — FENTANYL CITRATE 25 MCG: 50 INJECTION INTRAMUSCULAR; INTRAVENOUS at 03:01

## 2018-12-16 RX ADMIN — MAGNESIUM SULFATE IN WATER 2 G: 40 INJECTION, SOLUTION INTRAVENOUS at 10:47

## 2018-12-16 RX ADMIN — HYDROCORTISONE SODIUM SUCCINATE 50 MG: 100 INJECTION, POWDER, FOR SOLUTION INTRAMUSCULAR; INTRAVENOUS at 15:25

## 2018-12-16 RX ADMIN — METRONIDAZOLE 500 MG: 500 INJECTION, SOLUTION INTRAVENOUS at 06:04

## 2018-12-16 RX ADMIN — NOREPINEPHRINE BITARTRATE 4 MCG/MIN: 1 INJECTION INTRAVENOUS at 10:19

## 2018-12-16 RX ADMIN — LEVETIRACETAM 1000 MG: 100 SOLUTION ORAL at 21:58

## 2018-12-16 RX ADMIN — HYDROCORTISONE SODIUM SUCCINATE 50 MG: 100 INJECTION, POWDER, FOR SOLUTION INTRAMUSCULAR; INTRAVENOUS at 10:25

## 2018-12-16 RX ADMIN — MELATONIN TAB 3 MG 6 MG: 3 TAB at 22:08

## 2018-12-16 RX ADMIN — ISODIUM CHLORIDE 3 ML: 0.03 SOLUTION RESPIRATORY (INHALATION) at 14:36

## 2018-12-16 RX ADMIN — VANCOMYCIN HYDROCHLORIDE 1250 MG: 500 INJECTION, POWDER, LYOPHILIZED, FOR SOLUTION INTRAVENOUS at 13:36

## 2018-12-16 RX ADMIN — THIAMINE HYDROCHLORIDE 200 MG: 100 INJECTION, SOLUTION INTRAMUSCULAR; INTRAVENOUS at 10:26

## 2018-12-16 RX ADMIN — VALPROIC ACID 250 MG: 500 SOLUTION ORAL at 13:48

## 2018-12-16 RX ADMIN — Medication 0.6 MCG/KG/HR: at 21:35

## 2018-12-16 RX ADMIN — VALPROIC ACID 250 MG: 500 SOLUTION ORAL at 22:00

## 2018-12-16 RX ADMIN — LEVOCARNITINE 750 MG: 1 SOLUTION ORAL at 21:59

## 2018-12-16 RX ADMIN — LACTULOSE 30 G: 10 SOLUTION ORAL at 15:29

## 2018-12-16 RX ADMIN — LEVOCARNITINE 750 MG: 1 SOLUTION ORAL at 15:28

## 2018-12-16 RX ADMIN — HYDROCORTISONE SODIUM SUCCINATE 50 MG: 100 INJECTION, POWDER, FOR SOLUTION INTRAMUSCULAR; INTRAVENOUS at 22:59

## 2018-12-16 RX ADMIN — TOPIRAMATE 200 MG: 100 TABLET, FILM COATED ORAL at 21:59

## 2018-12-16 RX ADMIN — POTASSIUM CHLORIDE 20 MEQ: 200 INJECTION, SOLUTION INTRAVENOUS at 21:20

## 2018-12-16 RX ADMIN — THIAMINE HYDROCHLORIDE 200 MG: 100 INJECTION, SOLUTION INTRAMUSCULAR; INTRAVENOUS at 21:33

## 2018-12-16 RX ADMIN — Medication 0.5 MCG/KG/HR: at 06:40

## 2018-12-16 RX ADMIN — LEVOCARNITINE 750 MG: 1 SOLUTION ORAL at 08:47

## 2018-12-16 RX ADMIN — ASCORBIC ACID 1500 MG: 500 INJECTION, SOLUTION INTRAMUSCULAR; INTRAVENOUS; SUBCUTANEOUS at 12:14

## 2018-12-16 RX ADMIN — LEVALBUTEROL 1.25 MG: 1.25 SOLUTION, CONCENTRATE RESPIRATORY (INHALATION) at 08:06

## 2018-12-16 RX ADMIN — LEVALBUTEROL 1.25 MG: 1.25 SOLUTION, CONCENTRATE RESPIRATORY (INHALATION) at 14:36

## 2018-12-16 RX ADMIN — SODIUM CHLORIDE 50 ML/HR: 0.9 INJECTION, SOLUTION INTRAVENOUS at 10:30

## 2018-12-16 RX ADMIN — LEVETIRACETAM 1000 MG: 100 SOLUTION ORAL at 08:49

## 2018-12-16 RX ADMIN — MAGNESIUM SULFATE HEPTAHYDRATE 2 G: 40 INJECTION, SOLUTION INTRAVENOUS at 08:52

## 2018-12-16 RX ADMIN — ASCORBIC ACID 1500 MG: 500 INJECTION, SOLUTION INTRAMUSCULAR; INTRAVENOUS; SUBCUTANEOUS at 22:10

## 2018-12-16 RX ADMIN — ASCORBIC ACID 1500 MG: 500 INJECTION, SOLUTION INTRAMUSCULAR; INTRAVENOUS; SUBCUTANEOUS at 15:27

## 2018-12-16 RX ADMIN — POTASSIUM CHLORIDE 20 MEQ: 200 INJECTION, SOLUTION INTRAVENOUS at 22:30

## 2018-12-16 RX ADMIN — POTASSIUM CHLORIDE 20 MEQ: 200 INJECTION, SOLUTION INTRAVENOUS at 23:35

## 2018-12-16 RX ADMIN — RUFINAMIDE 1200 MG: 200 TABLET, FILM COATED ORAL at 08:50

## 2018-12-16 RX ADMIN — ASCORBIC ACID 1500 MG: 500 INJECTION, SOLUTION INTRAMUSCULAR; INTRAVENOUS; SUBCUTANEOUS at 04:04

## 2018-12-16 RX ADMIN — RUFINAMIDE 1200 MG: 200 TABLET, FILM COATED ORAL at 18:53

## 2018-12-16 RX ADMIN — TOPIRAMATE 200 MG: 100 TABLET, FILM COATED ORAL at 08:48

## 2018-12-16 NOTE — PROGRESS NOTES
Dr Antwon Velásquez made aware bp are in 70's-80's / 40's -50's, will put in orders to start Levophed drip

## 2018-12-16 NOTE — NURSING NOTE
Patient's temp @ 00:30 was 95 1 F temporally, rechecked rectally was 94 4 F  Radha Huerta PA-C made aware  Verbal order for [facility] received and placed; axillary and probe monitor inserted orally into trachea to continuously monitor body temp  Jasmina hugger to be kept in place until temperature reaches 97 5 F  Potassium result @ 02:05 for critical low of 2 4  Order received for 20 mEq Potassium IVPB x 3 doses via CVC for total of 60 mEq  Lactic acid results so far for this shift as follows:    12/15 @ 21:25 - 3 2  12/16 @ 00:19 - 2 8  12/16 @ 02:57 - 3 6    PA notified of the same; order received for 500 mL bolus of NSS over 1 hour  Will recheck as scheduled @ 06:00

## 2018-12-16 NOTE — RESPIRATORY THERAPY NOTE
Nsg called for assistance due to pt possibly biting down or ETT and unable to use serrano sxn cath  Immediately rec Oral airway insertion until I arrive  Upon arrival, oral airway  Inserted to tip of teet; but  I managed to insert fully without any trauma to pt mouth and no blood noted  Oral care performed and mouth sxn; pt jennifer well & is synchronistic with vent resting quietly

## 2018-12-16 NOTE — ASSESSMENT & PLAN NOTE
Acute metabolic encephalopathy persists, likely secondary to severe sepsis, patient history chronic intractable epilepsy

## 2018-12-16 NOTE — ASSESSMENT & PLAN NOTE
Suspected aspiration pneumonia, possible gram-negative pneumonia, healthcare associated/possible Staph aureus pneumonia    Procalcitonin level was elevated, continue to trend, continue current broad-spectrum antibiotics

## 2018-12-16 NOTE — PROCEDURES
Central Line Insertion  Date/Time: 12/15/2018 10:15 PM  Performed by: Tiki Alford by: Libby Badillo     Patient location:  Bedside  Consent:     Consent obtained:  Written    Consent given by:  Parent    Risks discussed:  Arterial puncture, bleeding, incorrect placement, infection, nerve damage and pneumothorax    Alternatives discussed:  No treatment  Universal protocol:     Procedure explained and questions answered to patient or proxy's satisfaction: yes      Relevant documents present and verified: yes      Test results available and properly labeled: yes      Radiology Images displayed and confirmed  If images not available, report reviewed: yes      Required blood products, implants, devices, and special equipment available: yes      Site/side marked: yes      Immediately prior to procedure, a time out was called: yes    Pre-procedure details:     Hand hygiene: Hand hygiene performed prior to insertion      Sterile barrier technique: All elements of maximal sterile technique followed      Skin preparation:  2% chlorhexidine    Skin preparation agent: Skin preparation agent completely dried prior to procedure    Indications:     Central line indications: medications requiring central line, hemodynamic monitoring and no peripheral vascular access    Anesthesia (see MAR for exact dosages):      Anesthesia method:  Local infiltration    Local anesthetic:  Lidocaine 1% w/o epi  Procedure details:     Location:  Right internal jugular    Vessel type: vein      Laterality:  Right    Approach: percutaneous technique used      Patient position:  Flat    Catheter type:  Triple lumen 16cm    Catheter size:  7 Fr    Landmarks identified: yes      Ultrasound guidance: yes      Sterile ultrasound techniques: Sterile gel and sterile probe covers were used      Number of attempts:  1    Successful placement: yes      Vessel of catheter tip end:  RA/SVC Junction  Post-procedure details:     Post-procedure: Line sutured and dressing applied    Assessment:  Blood return through all ports, no pneumothorax on x-ray, free fluid flow and placement verified by x-ray    Post-procedure complications: none      Patient tolerance of procedure:   Tolerated well, no immediate complications

## 2018-12-16 NOTE — ASSESSMENT & PLAN NOTE
Severe sepsis, present on admission,  secondary to pneumonia, suspected aspiration pneumonia, patient also with history of MRSA colonization, will continue broad-spectrum antibiotics cefepime, vancomycin, Flagyl today is day 3 of antibiotic therapy  Follow up cultures  Patient developed increased respiratory rate overnight, increased work of breathing, CT of the chest shows left pleural effusion, essentially left lung is ryley out, patient intubated 12/15/2018    Patient developed hypotension today, IV Levophed added    Monitor respiratory status    Tube feeds started 12/15/2018

## 2018-12-16 NOTE — RESPIRATORY THERAPY NOTE
RT Ventilator Management Note  Donald Villalobos 41 y o  female MRN: [id number]  Unit/Bed#:  Encounter: [id number]      Daily Screen       12/16/2018 0522 12/16/2018 1103          Patient safety screen outcome[de-identified] - Passed      Spont breathing trial % for 30 min: - Yes      Spont breathing trial outcome[de-identified] - Failed      Spont breathing trial reason failed: - -      Previous settings resumed: No (comment) Yes      Name of Medical Team Notified[de-identified] - Dr Kingsley Will      RSBI: - 61        Pt  Would not follow commands    NIF performed  - 8 7      Physical Exam:          Resp Comments: full water bag

## 2018-12-16 NOTE — PROGRESS NOTES
Progress Note - Noemy Galvez 1980, 41 y o  female MRN: [id number]    Unit/Bed#:  Encounter: [id number]    Primary Care Provider: Jannie Saunders DO   Date and time admitted to hospital: 12/14/2018  5:44 AM        * Severe sepsis [facility])   Assessment & Plan    Severe sepsis, present on admission,  secondary to pneumonia, suspected aspiration pneumonia, patient also with history of MRSA colonization, will continue broad-spectrum antibiotics cefepime, vancomycin, Flagyl today is day 3 of antibiotic therapy  Follow up cultures  Patient developed increased respiratory rate overnight, increased work of breathing, CT of the chest shows left pleural effusion, essentially left lung is ryley out, patient intubated 12/15/2018    Patient developed hypotension today, IV Levophed added    Monitor respiratory status  Tube feeds started 12/15/2018     Pleural effusion   Assessment & Plan    Consult to Pulmonary to discuss thoracentesis     Aspiration pneumonia [facility])   Assessment & Plan    Suspected aspiration pneumonia, possible gram-negative pneumonia, healthcare associated/possible Staph aureus pneumonia    Procalcitonin level was elevated, continue to trend, continue current broad-spectrum antibiotics     Acute respiratory failure with hypoxia [facility])   Assessment & Plan    Patient is reported to have significant hypoxia by EMS, oxygen level corrected with the application of oxygen, patient developed increasing work of breathing despite Vapotherm, patient intubated today 12/15/2018  Intractable epilepsy without status epilepticus ([facility] [address] )   Assessment & Plan    Chronic condition, continue multiple medications patients complex anti epileptic drug regimen     Acute encephalopathy   Assessment & Plan    Acute metabolic encephalopathy persists, likely secondary to severe sepsis, patient history chronic intractable epilepsy       Lactic acidosis   Assessment & Plan    Continue to monitor     Lennox-Gastaut syndrome with tonic seizures (HCC)   Assessment & Plan    Plan as above for history of seizures     Hyperammonemia (HCC)   Assessment & Plan    Chronic condition, continue lactulose     Underweight   Assessment & Plan    Continue tube feeds, consult to Nutrition     Transaminitis   Assessment & Plan    Chronic         VTE Pharmacologic Prophylaxis:   Pharmacologic: Enoxaparin (Lovenox)  Mechanical VTE Prophylaxis in Place: Yes    Patient Centered Rounds: I have performed bedside rounds with nursing staff today  Discussions with Specialists or Other Care Team Provider:  Case discussed with nursing team and with respiratory therapist    Weaning parameters were reviewed on ventilator    Education and Discussions with Family / Patient:  Patient's father was updated via telephone with plan of care yesterday a m  Time Spent for Care:    Critical care time 40 minutes    Current Length of Stay: 2 day(s)    Current Patient Status: Inpatient   Certification Statement: The patient will continue to require additional inpatient hospital stay due to Acute respiratory failure requiring mechanical ventilation    Discharge Plan / Estimated Discharge Date: To be determined      Code Status: Level 1 - Full Code      Subjective:   Patient resting comfortably on ventilator, no signs of distress, patient's interaction at baseline is very limited    Objective:     Vitals:   Temp (24hrs), Av 7 °F (34 8 °C), Min:91 7 °F (33 2 °C), Max:97 6 °F (36 4 °C)    Temp:  [91 7 °F (33 2 °C)-97 6 °F (36 4 °C)] 94 4 °F (34 7 °C)  HR:  [64-67] 64  Resp:  [14] 14  BP: ()/(64-71) 89/64  SpO2:  [95 %-99 %] 95 %  Body mass index is 23 12 kg/m²  Input and Output Summary (last 24 hours):        Intake/Output Summary (Last 24 hours) at 18 1153  Last data filed at 18 1027   Gross per 24 hour   Intake          6635 95 ml   Output              950 ml   Net          5685 95 ml       Physical Exam:     Physical Exam   Constitutional: No distress  HENT:   Head: Normocephalic  Eyes: Pupils are equal, round, and reactive to light  Cardiovascular: Normal rate, regular rhythm and intact distal pulses  No murmur heard  Pulmonary/Chest: Effort normal and breath sounds normal  No respiratory distress  She has no wheezes  Abdominal: Soft  She exhibits distension (Mildly distended)  She exhibits no mass  There is no tenderness  There is no rebound and no guarding  Peg tube in place, rectal tube present, Woods catheter present   Musculoskeletal: She exhibits edema (Generalized mild edema)  Neurological:   Patient is currently intubated on ventilator, on Precedex infusion, appears to be comfortable, does seem to respond to some physical stimuli, no meaningful movements  Skin:   Right IJ central line, no erythema, dressing intact   Nursing note and vitals reviewed  Additional Data:     Labs:      Results from last 7 days  Lab Units 12/16/18  0632   WBC Thousand/uL 22 32*   HEMOGLOBIN g/dL 9 8*   HEMATOCRIT % 29 8*   PLATELETS Thousands/uL 208   NEUTROS PCT % 87*   LYMPHS PCT % 7*   MONOS PCT % 5   EOS PCT % 0       Results from last 7 days  Lab Units 12/16/18  0632  12/14/18  1244   POTASSIUM mmol/L 3 7  < >  --    CHLORIDE mmol/L 117*  < >  --    CO2 mmol/L 18*  < >  --    CO2, I-STAT mmol/L  --   --  19*   BUN mg/dL 10  < >  --    CREATININE mg/dL 0 38*  < >  --    CALCIUM mg/dL 6 7*  < >  --    ALK PHOS U/L 117*  < >  --    ALT U/L 54  < >  --    AST U/L 43  < >  --    GLUCOSE, ISTAT mg/dl  --   --  123   < > = values in this interval not displayed  Results from last 7 days  Lab Units 12/15/18  0645   INR  1 57*       * I Have Reviewed All Lab Data Listed Above  * Additional Pertinent Lab Tests Reviewed: [facility]          Recent Cultures (last 7 days):       Results from last 7 days  Lab Units 12/14/18  0749 12/14/18  0718 12/14/18  0659   BLOOD CULTURE   --  No Growth at 24 hrs   No Growth at 24 hrs     INFLUENZA B PCR  None Detected  --   --    RSV PCR  None Detected  --   --        Last 24 Hours Medication List:     Current Facility-Administered Medications:  albuterol 2 5 mg Nebulization Q4H PRN Tee Talbert MD    ascorbic acid in sodium chloride 0 9% 100 mL IVPB 1,500 mg Intravenous Q6H Paula Guadarrama PA-C Last Rate: 0 mg (12/15/18 2330)   cefepime 2,000 mg Intravenous Q12H Tee Talbert MD Last Rate: Stopped (12/16/18 0009)   chlorhexidine 15 mL [facility] Abbie Dustydt, DO    dexmedetomidine (PRECEDEX) 200 mcg in 50 ml sodium chloride 0 9% 0 1-1 4 mcg/kg/hr Intravenous Titrated [place] Guadarrama PA-C Last Rate: 1 mcg/kg/hr (12/16/18 1101)   enoxaparin 40 mg Subcutaneous Q24H [address] Abbie Fendt, DO    hydrocortisone sodium succinate 50 mg Intravenous Q6H [address] Rey Barron PA-C    lactulose 30 g Per G Tube TID Abbie Fendt, DO    levalbuterol 1 25 mg Nebulization TID Tee Talbert MD    levETIRAcetam 1,000 mg Per G Tube Q12H [address] Abbie Fendt, DO    levOCARNitine 750 mg Per G Tube TID Abbie Fendt, DO    magnesium sulfate 2 g Intravenous Once Abbie Fendt, DO    melatonin 6 mg Per G Tube HS Abbie Fendt, DO    metroNIDAZOLE 500 mg Intravenous [facility] Abbie Fendt, DO Last Rate: Stopped (12/16/18 6109)   norepinephrine 1-30 mcg/min Intravenous Titrated Abbie Fendt, DO Last Rate: 4 mcg/min (12/16/18 1019)   ondansetron 4 mg Intravenous Q4H PRN Abbie Fendt, DO    Perampanel 8 mg Per NG Tube HS Abbie Fendt, DO    PHENobarbital 60 mg Oral HS Abbie Fendt, DO    potassium phosphate 30 mmol Intravenous Once Abbie Fendt, DO Last Rate: 30 mmol (12/16/18 0929)   rufinamide 1,200 mg Per G Tube BID Tee Talbert MD    sodium chloride 50 mL/hr Intravenous Continuous Abbie Fendt, DO Last Rate: 50 mL/hr (12/16/18 1107)   sodium chloride 3 mL Nebulization TID Tee Talbert MD    thiamine 200 mg Intravenous Q12H Paula Guadarrama PA-C Last Rate: Stopped (12/16/18 1107)   topiramate 200 mg Oral [facility] Debria Hatchet, DO    valproic acid 250 mg Per G Tube Q8H Debria Hatchet, DO    vancomycin 1,250 mg Intravenous Chandni Douglass DO Last Rate: 1,250 mg (12/16/18 0505)     Facility-Administered Medications Ordered in Other Encounters:  propofol  Intravenous PRN Betsy Aburto CRNA   vecuronium   PRN Betsy Aburto CRNA        Today, Patient Was Seen By: Debria Hatchet, DO

## 2018-12-16 NOTE — PLAN OF CARE
Problem: NEUROSENSORY - ADULT  Goal: Achieves stable or improved neurological status  INTERVENTIONS  - Monitor and report changes in neurological status  - Initiate measures to prevent increased intracranial pressure  - Maintain blood pressure and fluid volume within ordered parameters to optimize cerebral perfusion  - Monitor temperature, glucose, and sodium or any other associated labs   Initiate appropriate interventions as ordered  - Monitor for seizure activity   - Administer anti-seizure medications as ordered   Outcome: Progressing    Goal: Absence of seizures  INTERVENTIONS  - Monitor for seizure activity  - Administer anti-seizure medications as ordered  - Monitor neurological status   Outcome: Progressing    Goal: Remains free of injury related to seizures activity  INTERVENTIONS  - Maintain airway, patient safety  and administer oxygen as ordered  - Monitor patient for seizure activity, document and report duration and description of seizure to physician/advanced practitioner  - If seizure occurs,  ensure patient safety during seizure  - Reorient patient post seizure  - Seizure pads on all 4 side rails  - Instruct patient/family to notify RN of any seizure activity including if an aura is experienced  - Instruct patient/family to call for assistance with activity based on nursing assessment  - Administer anti-seizure medications as ordered  - Monitor fetal well being   Outcome: Progressing    Goal: Achieves maximal functionality and self care  INTERVENTIONS  - Monitor swallowing and airway patency with patient fatigue and changes in neurological status  - Encourage and assist patient to increase activity and self care with guidance from rehab services  - Encourage visually impaired, hearing impaired and aphasic patients to use assistive/communication devices   Outcome: Not Progressing      Problem: CARDIOVASCULAR - ADULT  Goal: Maintains optimal cardiac output and hemodynamic stability  INTERVENTIONS:  - Monitor I/O, vital signs and rhythm  - Monitor for S/S and trends of decreased cardiac output i e  bleeding, hypotension  - Administer and titrate ordered vasoactive medications to optimize hemodynamic stability  - Assess quality of pulses, skin color and temperature  - Assess for signs of decreased coronary artery perfusion - ex   Angina  - Instruct patient to report change in severity of symptoms   Outcome: Progressing    Goal: Absence of cardiac dysrhythmias or at baseline rhythm  INTERVENTIONS:  - Continuous cardiac monitoring, monitor vital signs, obtain 12 lead EKG if indicated  - Administer antiarrhythmic and heart rate control medications as ordered  - Monitor electrolytes and administer replacement therapy as ordered   Outcome: Progressing      Problem: RESPIRATORY - ADULT  Goal: Achieves optimal ventilation and oxygenation  INTERVENTIONS:  - Assess for changes in respiratory status  - Assess for changes in mentation and behavior  - Position to facilitate oxygenation and minimize respiratory effort  - Oxygen administration by appropriate delivery method based on oxygen saturation (per order) or ABGs  - Initiate smoking cessation education as indicated  - Encourage broncho-pulmonary hygiene including cough, deep breathe, Incentive Spirometry  - Assess the need for suctioning and aspirate as needed  - Assess and instruct to report SOB or any respiratory difficulty  - Respiratory Therapy support as indicated   Outcome: Progressing      Problem: GASTROINTESTINAL - ADULT  Goal: Maintains or returns to baseline bowel function  INTERVENTIONS:  - Assess bowel function  - Encourage oral fluids to ensure adequate hydration  - Administer IV fluids as ordered to ensure adequate hydration  - Administer ordered medications as needed  - Encourage mobilization and activity  - Nutrition services referral to assist patient with appropriate food choices   Outcome: Not Progressing    Goal: Maintains adequate nutritional intake  INTERVENTIONS:  - Monitor percentage of each meal consumed  - Identify factors contributing to decreased intake, treat as appropriate  - Assist with meals as needed  - Monitor I&O, WT and lab values  - Obtain nutrition services referral as needed   Outcome: Not Progressing      Problem: GENITOURINARY - ADULT  Goal: Maintains or returns to baseline urinary function  INTERVENTIONS:  - Assess urinary function  - Encourage oral fluids to ensure adequate hydration  - Administer IV fluids as ordered to ensure adequate hydration  - Administer ordered medications as needed  - Offer frequent toileting  - Follow urinary retention protocol if ordered    Outcome: Not Progressing    Goal: Absence of urinary retention  INTERVENTIONS:  - Assess patients ability to void and empty bladder  - Monitor I/O  - Bladder scan as needed  - Discuss with physician/AP medications to alleviate retention as needed  - Discuss catheterization for long term situations as appropriate    Outcome: Not Progressing    Goal: Urinary catheter remains patent  INTERVENTIONS:  - Assess patency of urinary catheter  - If patient has a chronic nguyen, consider changing catheter if non-functioning  - Follow guidelines for intermittent irrigation of non-functioning urinary catheter   Outcome: Progressing      Problem: METABOLIC, FLUID AND ELECTROLYTES - ADULT  Goal: Electrolytes maintained within normal limits  INTERVENTIONS:  - Monitor labs and assess patient for signs and symptoms of electrolyte imbalances  - Administer electrolyte replacement as ordered  - Monitor response to electrolyte replacements, including repeat lab results as appropriate  - Instruct patient on fluid and nutrition as appropriate   Outcome: Progressing    Goal: Fluid balance maintained  INTERVENTIONS:  - Monitor labs and assess for signs and symptoms of volume excess or deficit  - Monitor I/O and WT  - Instruct patient on fluid and nutrition as appropriate   Outcome: Progressing    Goal: Glucose maintained within target range  INTERVENTIONS:  - Monitor Blood Glucose as ordered  - Assess for signs and symptoms of hyperglycemia and hypoglycemia  - Administer ordered medications to maintain glucose within target range  - Assess nutritional intake and initiate nutrition service referral as needed   Outcome: Progressing      Problem: SKIN/TISSUE INTEGRITY - ADULT  Goal: Skin integrity remains intact  INTERVENTIONS  - Identify patients at risk for skin breakdown  - Assess and monitor skin integrity  - Assess and monitor nutrition and hydration status  - Monitor labs (i e  albumin)  - Assess for incontinence   - Turn and reposition patient  - Assist with mobility/ambulation  - Relieve pressure over bony prominences  - Avoid friction and shearing  - Provide appropriate hygiene as needed including keeping skin clean and dry  - Evaluate need for skin moisturizer/barrier cream  - Collaborate with interdisciplinary team (i e  Nutrition, Rehabilitation, etc )   - Patient/family teaching   Outcome: Progressing    Goal: Incision(s), wounds(s) or drain site(s) healing without S/S of infection  INTERVENTIONS  - Assess and document risk factors for skin impairment   - Assess and document dressing, incision, wound bed, drain sites and surrounding tissue  - Initiate Nutrition services consult and/or wound management as needed   Outcome: Progressing    Goal: Oral mucous membranes remain intact  INTERVENTIONS  - Assess oral mucosa and hygiene practices  - Implement preventative oral hygiene regimen  - Implement oral medicated treatments as ordered  - Initiate Nutrition services referral as needed   Outcome: Progressing      Problem: HEMATOLOGIC - ADULT  Goal: Maintains hematologic stability  INTERVENTIONS  - Assess for signs and symptoms of bleeding or hemorrhage  - Monitor labs  - Administer supportive blood products/factors as ordered and appropriate   Outcome: Progressing      Problem: MUSCULOSKELETAL - ADULT  Goal: Maintain or return mobility to safest level of function  INTERVENTIONS:  - Assess patient's ability to carry out ADLs; assess patient's baseline for ADL function and identify physical deficits which impact ability to perform ADLs (bathing, care of mouth/teeth, toileting, grooming, dressing, etc )  - Assess/evaluate cause of self-care deficits   - Assess range of motion  - Assess patient's mobility; develop plan if impaired  - Assess patient's need for assistive devices and provide as appropriate  - Encourage maximum independence but intervene and supervise when necessary  - Involve family in performance of ADLs  - Assess for home care needs following discharge   - Request OT consult to assist with ADL evaluation and planning for discharge  - Provide patient education as appropriate   Outcome: Not Progressing    Goal: Maintain proper alignment of affected body part  INTERVENTIONS:  - Support, maintain and protect limb and body alignment  - Provide pt/fam with appropriate education   Outcome: Progressing      Problem: Prexisting or High Potential for Compromised Skin Integrity  Goal: Skin integrity is maintained or improved  INTERVENTIONS:  - Identify patients at risk for skin breakdown  - Assess and monitor skin integrity  - Assess and monitor nutrition and hydration status  - Monitor labs (i e  albumin)  - Assess for incontinence   - Turn and reposition patient  - Assist with mobility/ambulation  - Relieve pressure over bony prominences  - Avoid friction and shearing  - Provide appropriate hygiene as needed including keeping skin clean and dry  - Evaluate need for skin moisturizer/barrier cream  - Collaborate with interdisciplinary team (i e  Nutrition, Rehabilitation, etc )   - Patient/family teaching   Outcome: Progressing      Problem: SAFETY,RESTRAINT: NV/NON-SELF DESTRUCTIVE BEHAVIOR  Goal: Remains free of harm/injury (restraint for non violent/non self-detsructive behavior)  INTERVENTIONS:  - Instruct patient/family regarding restraint use   - Assess and monitor physiologic and psychological status   - Provide interventions and comfort measures to meet assessed patient needs   - Identify and implement measures to help patient regain control  - Assess readiness for release of restraint    Outcome: Progressing    Goal: Returns to optimal restraint-free functioning  INTERVENTIONS:  - Assess the patient's behavior and symptoms that indicate continued need for restraint  - Identify and implement measures to help patient regain control  - Assess readiness for release of restraint    Outcome: Progressing      Problem: Nutrition/Hydration-ADULT  Goal: Nutrient/Hydration intake appropriate for improving, restoring or maintaining nutritional needs  Monitor and assess patient's nutrition/hydration status for malnutrition (ex- brittle hair, bruises, dry skin, pale skin and conjunctiva, muscle wasting, smooth red tongue, and disorientation)  Collaborate with interdisciplinary team and initiate plan and interventions as ordered  Monitor patient's weight and dietary intake as ordered or per policy  Utilize nutrition screening tool and intervene per policy  Determine patient's food preferences and provide high-protein, high-caloric foods as appropriate       INTERVENTIONS:  - Monitor oral intake, urinary output, labs, and treatment plans  - Assess nutrition and hydration status and recommend course of action  - Evaluate amount of meals eaten  - Assist patient with eating if necessary   - Allow adequate time for meals  - Recommend/ encourage appropriate diets, oral nutritional supplements, and vitamin/mineral supplements  - Order, calculate, and assess calorie counts as needed  - Recommend, monitor, and adjust tube feedings and TPN/PPN based on assessed needs  - Assess need for intravenous fluids  - Provide specific nutrition/hydration education as appropriate  - Include patient/family/caregiver in decisions related to nutrition   Outcome: Not Progressing      Problem: DISCHARGE PLANNING - CARE MANAGEMENT  Goal: Discharge to post-acute care or home with appropriate resources  INTERVENTIONS:  - Conduct assessment to determine patient/family and health care team treatment goals, and need for post-acute services based on payer coverage, community resources, and patient preferences, and barriers to discharge  - Address psychosocial, clinical, and financial barriers to discharge as identified in assessment in conjunction with the patient/family and health care team  - Arrange appropriate level of post-acute services according to patient's   needs and preference and payer coverage in collaboration with the physician and health care team  - Communicate with and update the patient/family, physician, and health care team regarding progress on the discharge plan  - Arrange appropriate transportation to post-acute venues    Pt in icu need to reassess d/c plan   Outcome: Not Progressing      Problem: Potential for Falls  Goal: Patient will remain free of falls  INTERVENTIONS:  - Assess patient frequently for physical needs  -  Identify cognitive and physical deficits and behaviors that affect risk of falls    -  [place] fall precautions as indicated by assessment   - Educate patient/family on patient safety including physical limitations  - Instruct patient to call for assistance with activity based on assessment  - Modify environment to reduce risk of injury  - Consider OT/PT consult to assist with strengthening/mobility   Outcome: Progressing

## 2018-12-17 ENCOUNTER — APPOINTMENT (INPATIENT)
Dept: RADIOLOGY | Facility: HOSPITAL | Age: 38
DRG: 871 | End: 2018-12-17
Payer: MEDICARE

## 2018-12-17 ENCOUNTER — APPOINTMENT (INPATIENT)
Dept: INTERVENTIONAL RADIOLOGY/VASCULAR | Facility: HOSPITAL | Age: 38
DRG: 871 | End: 2018-12-17
Attending: INTERNAL MEDICINE
Payer: MEDICARE

## 2018-12-17 ENCOUNTER — APPOINTMENT (INPATIENT)
Dept: NON INVASIVE DIAGNOSTICS | Facility: HOSPITAL | Age: 38
DRG: 871 | End: 2018-12-17
Payer: MEDICARE

## 2018-12-17 PROBLEM — E87.8 ELECTROLYTE ABNORMALITY: Status: ACTIVE | Noted: 2018-12-17

## 2018-12-17 PROBLEM — R65.21 SEVERE SEPSIS WITH SEPTIC SHOCK (CODE) (HCC): Status: ACTIVE | Noted: 2018-10-09

## 2018-12-17 LAB
ALBUMIN SERPL BCP-MCNC: 1.6 G/DL (ref 3.5–5)
ALP SERPL-CCNC: 124 U/L (ref 46–116)
ALT SERPL W P-5'-P-CCNC: 55 U/L (ref 12–78)
ANION GAP SERPL CALCULATED.3IONS-SCNC: 11 MMOL/L (ref 4–13)
APPEARANCE FLD: ABNORMAL
AST SERPL W P-5'-P-CCNC: 44 U/L (ref 5–45)
ATRIAL RATE: 102 BPM
ATRIAL RATE: 103 BPM
ATRIAL RATE: 96 BPM
BASOPHILS # BLD AUTO: 0.02 THOUSANDS/ΜL (ref 0–0.1)
BASOPHILS NFR BLD AUTO: 0 % (ref 0–1)
BILIRUB SERPL-MCNC: 0.4 MG/DL (ref 0.2–1)
BUN SERPL-MCNC: 10 MG/DL (ref 5–25)
CALCIUM SERPL-MCNC: 7.3 MG/DL (ref 8.3–10.1)
CHLORIDE SERPL-SCNC: 117 MMOL/L (ref 100–108)
CO2 SERPL-SCNC: 21 MMOL/L (ref 21–32)
COLOR FLD: ABNORMAL
CREAT SERPL-MCNC: 0.32 MG/DL (ref 0.6–1.3)
EOSINOPHIL # BLD AUTO: 0 THOUSAND/ΜL (ref 0–0.61)
EOSINOPHIL NFR BLD AUTO: 0 % (ref 0–6)
ERYTHROCYTE [DISTWIDTH] IN BLOOD BY AUTOMATED COUNT: 14.8 % (ref 11.6–15.1)
GFR SERPL CREATININE-BSD FRML MDRD: 144 ML/MIN/1.73SQ M
GLUCOSE FLD-MCNC: 229 MG/DL
GLUCOSE SERPL-MCNC: 179 MG/DL (ref 65–140)
GLUCOSE SERPL-MCNC: 232 MG/DL (ref 65–140)
HCT VFR BLD AUTO: 28.3 % (ref 34.8–46.1)
HGB BLD-MCNC: 9.4 G/DL (ref 11.5–15.4)
IMM GRANULOCYTES # BLD AUTO: 0.36 THOUSAND/UL (ref 0–0.2)
IMM GRANULOCYTES NFR BLD AUTO: 2 % (ref 0–2)
LACTATE SERPL-SCNC: 3.1 MMOL/L (ref 0.5–2)
LACTATE SERPL-SCNC: 4.3 MMOL/L (ref 0.5–2)
LACTATE SERPL-SCNC: 4.3 MMOL/L (ref 0.5–2)
LDH FLD L TO P-CCNC: 154 U/L
LDH SERPL-CCNC: 179 U/L (ref 81–234)
LYMPHOCYTES # BLD AUTO: 1.92 THOUSANDS/ΜL (ref 0.6–4.47)
LYMPHOCYTES NFR BLD AUTO: 2 %
LYMPHOCYTES NFR BLD AUTO: 9 % (ref 14–44)
MAGNESIUM SERPL-MCNC: 1.8 MG/DL (ref 1.6–2.6)
MCH RBC QN AUTO: 34.2 PG (ref 26.8–34.3)
MCHC RBC AUTO-ENTMCNC: 33.2 G/DL (ref 31.4–37.4)
MCV RBC AUTO: 103 FL (ref 82–98)
MONOCYTES # BLD AUTO: 1.14 THOUSAND/ΜL (ref 0.17–1.22)
MONOCYTES NFR BLD AUTO: 6 %
MONOCYTES NFR BLD AUTO: 6 % (ref 4–12)
NEUTROPHILS # BLD AUTO: 16.93 THOUSANDS/ΜL (ref 1.85–7.62)
NEUTS SEG NFR BLD AUTO: 83 % (ref 43–75)
NEUTS SEG NFR BLD AUTO: 92 %
NRBC BLD AUTO-RTO: 0 /100 WBCS
P AXIS: 45 DEGREES
P AXIS: 74 DEGREES
P AXIS: 76 DEGREES
PH BODY FLUID: 7.4
PHOSPHATE SERPL-MCNC: 1.7 MG/DL (ref 2.7–4.5)
PLATELET # BLD AUTO: 224 THOUSANDS/UL (ref 149–390)
PMV BLD AUTO: 10.9 FL (ref 8.9–12.7)
POTASSIUM SERPL-SCNC: 2.8 MMOL/L (ref 3.5–5.3)
PR INTERVAL: 118 MS
PR INTERVAL: 140 MS
PR INTERVAL: 148 MS
PROCALCITONIN SERPL-MCNC: 3.32 NG/ML
PROT FLD-MCNC: 1 G/DL
PROT SERPL-MCNC: 4.5 G/DL (ref 6.4–8.2)
QRS AXIS: 68 DEGREES
QRS AXIS: 68 DEGREES
QRS AXIS: 71 DEGREES
QRSD INTERVAL: 60 MS
QRSD INTERVAL: 70 MS
QRSD INTERVAL: 70 MS
QT INTERVAL: 286 MS
QT INTERVAL: 346 MS
QT INTERVAL: 418 MS
QTC INTERVAL: 361 MS
QTC INTERVAL: 450 MS
QTC INTERVAL: 547 MS
RBC # BLD AUTO: 2.75 MILLION/UL (ref 3.81–5.12)
SITE: ABNORMAL
SODIUM SERPL-SCNC: 149 MMOL/L (ref 136–145)
T WAVE AXIS: 13 DEGREES
T WAVE AXIS: 244 DEGREES
T WAVE AXIS: 268 DEGREES
TOTAL CELLS COUNTED SPEC: 100
TROPONIN I SERPL-MCNC: 0.02 NG/ML
VENTRICULAR RATE: 102 BPM
VENTRICULAR RATE: 103 BPM
VENTRICULAR RATE: 96 BPM
WBC # BLD AUTO: 20.37 THOUSAND/UL (ref 4.31–10.16)
WBC # FLD MANUAL: 1373 /UL

## 2018-12-17 PROCEDURE — 87077 CULTURE AEROBIC IDENTIFY: CPT | Performed by: INTERNAL MEDICINE

## 2018-12-17 PROCEDURE — 0B978ZZ DRAINAGE OF LEFT MAIN BRONCHUS, VIA NATURAL OR ARTIFICIAL OPENING ENDOSCOPIC: ICD-10-PCS | Performed by: INTERNAL MEDICINE

## 2018-12-17 PROCEDURE — 83735 ASSAY OF MAGNESIUM: CPT | Performed by: PHYSICIAN ASSISTANT

## 2018-12-17 PROCEDURE — 94664 DEMO&/EVAL PT USE INHALER: CPT

## 2018-12-17 PROCEDURE — 31622 DX BRONCHOSCOPE/WASH: CPT | Performed by: INTERNAL MEDICINE

## 2018-12-17 PROCEDURE — 94003 VENT MGMT INPAT SUBQ DAY: CPT

## 2018-12-17 PROCEDURE — 83615 LACTATE (LD) (LDH) ENZYME: CPT | Performed by: INTERNAL MEDICINE

## 2018-12-17 PROCEDURE — 94669 MECHANICAL CHEST WALL OSCILL: CPT

## 2018-12-17 PROCEDURE — 93306 TTE W/DOPPLER COMPLETE: CPT

## 2018-12-17 PROCEDURE — 93005 ELECTROCARDIOGRAM TRACING: CPT

## 2018-12-17 PROCEDURE — 89051 BODY FLUID CELL COUNT: CPT | Performed by: INTERNAL MEDICINE

## 2018-12-17 PROCEDURE — 87205 SMEAR GRAM STAIN: CPT | Performed by: INTERNAL MEDICINE

## 2018-12-17 PROCEDURE — 93010 ELECTROCARDIOGRAM REPORT: CPT | Performed by: INTERNAL MEDICINE

## 2018-12-17 PROCEDURE — 32555 ASPIRATE PLEURA W/ IMAGING: CPT

## 2018-12-17 PROCEDURE — 85025 COMPLETE CBC W/AUTO DIFF WBC: CPT | Performed by: PHYSICIAN ASSISTANT

## 2018-12-17 PROCEDURE — 87116 MYCOBACTERIA CULTURE: CPT | Performed by: INTERNAL MEDICINE

## 2018-12-17 PROCEDURE — 99255 IP/OBS CONSLTJ NEW/EST HI 80: CPT | Performed by: INTERNAL MEDICINE

## 2018-12-17 PROCEDURE — 83986 ASSAY PH BODY FLUID NOS: CPT | Performed by: INTERNAL MEDICINE

## 2018-12-17 PROCEDURE — 99291 CRITICAL CARE FIRST HOUR: CPT | Performed by: INTERNAL MEDICINE

## 2018-12-17 PROCEDURE — 83605 ASSAY OF LACTIC ACID: CPT | Performed by: PHYSICIAN ASSISTANT

## 2018-12-17 PROCEDURE — 94640 AIRWAY INHALATION TREATMENT: CPT

## 2018-12-17 PROCEDURE — 94760 N-INVAS EAR/PLS OXIMETRY 1: CPT

## 2018-12-17 PROCEDURE — 0W9B3ZX DRAINAGE OF LEFT PLEURAL CAVITY, PERCUTANEOUS APPROACH, DIAGNOSTIC: ICD-10-PCS | Performed by: RADIOLOGY

## 2018-12-17 PROCEDURE — 80165 DIPROPYLACETIC ACID FREE: CPT | Performed by: INTERNAL MEDICINE

## 2018-12-17 PROCEDURE — 84145 PROCALCITONIN (PCT): CPT | Performed by: INTERNAL MEDICINE

## 2018-12-17 PROCEDURE — 93308 TTE F-UP OR LMTD: CPT | Performed by: INTERNAL MEDICINE

## 2018-12-17 PROCEDURE — 84157 ASSAY OF PROTEIN OTHER: CPT | Performed by: INTERNAL MEDICINE

## 2018-12-17 PROCEDURE — 88305 TISSUE EXAM BY PATHOLOGIST: CPT | Performed by: PATHOLOGY

## 2018-12-17 PROCEDURE — 84100 ASSAY OF PHOSPHORUS: CPT | Performed by: PHYSICIAN ASSISTANT

## 2018-12-17 PROCEDURE — 88112 CYTOPATH CELL ENHANCE TECH: CPT | Performed by: PATHOLOGY

## 2018-12-17 PROCEDURE — 87206 SMEAR FLUORESCENT/ACID STAI: CPT | Performed by: INTERNAL MEDICINE

## 2018-12-17 PROCEDURE — 82948 REAGENT STRIP/BLOOD GLUCOSE: CPT

## 2018-12-17 PROCEDURE — 87070 CULTURE OTHR SPECIMN AEROBIC: CPT | Performed by: INTERNAL MEDICINE

## 2018-12-17 PROCEDURE — 32555 ASPIRATE PLEURA W/ IMAGING: CPT | Performed by: RADIOLOGY

## 2018-12-17 PROCEDURE — 71045 X-RAY EXAM CHEST 1 VIEW: CPT

## 2018-12-17 PROCEDURE — 84484 ASSAY OF TROPONIN QUANT: CPT | Performed by: NURSE PRACTITIONER

## 2018-12-17 PROCEDURE — 80053 COMPREHEN METABOLIC PANEL: CPT | Performed by: PHYSICIAN ASSISTANT

## 2018-12-17 PROCEDURE — 87147 CULTURE TYPE IMMUNOLOGIC: CPT | Performed by: INTERNAL MEDICINE

## 2018-12-17 PROCEDURE — 87186 SC STD MICRODIL/AGAR DIL: CPT | Performed by: INTERNAL MEDICINE

## 2018-12-17 PROCEDURE — 82945 GLUCOSE OTHER FLUID: CPT | Performed by: INTERNAL MEDICINE

## 2018-12-17 RX ORDER — FENTANYL CITRATE 50 UG/ML
25 INJECTION, SOLUTION INTRAMUSCULAR; INTRAVENOUS ONCE
Status: COMPLETED | OUTPATIENT
Start: 2018-12-17 | End: 2018-12-17

## 2018-12-17 RX ORDER — SODIUM CHLORIDE 450 MG/100ML
50 INJECTION, SOLUTION INTRAVENOUS CONTINUOUS
Status: DISCONTINUED | OUTPATIENT
Start: 2018-12-17 | End: 2018-12-18

## 2018-12-17 RX ORDER — AZTREONAM 1 G/1
1000 INJECTION, POWDER, LYOPHILIZED, FOR SOLUTION INTRAMUSCULAR; INTRAVENOUS EVERY 8 HOURS
Status: DISCONTINUED | OUTPATIENT
Start: 2018-12-17 | End: 2018-12-17

## 2018-12-17 RX ORDER — MAGNESIUM SULFATE 1 G/100ML
1 INJECTION INTRAVENOUS ONCE
Status: COMPLETED | OUTPATIENT
Start: 2018-12-17 | End: 2018-12-17

## 2018-12-17 RX ORDER — LIDOCAINE HYDROCHLORIDE 20 MG/ML
INJECTION, SOLUTION EPIDURAL; INFILTRATION; INTRACAUDAL; PERINEURAL
Status: COMPLETED
Start: 2018-12-17 | End: 2018-12-17

## 2018-12-17 RX ORDER — LIDOCAINE HYDROCHLORIDE 10 MG/ML
10 INJECTION, SOLUTION INFILTRATION; PERINEURAL ONCE
Status: COMPLETED | OUTPATIENT
Start: 2018-12-17 | End: 2018-12-17

## 2018-12-17 RX ORDER — PHENOBARBITAL 20 MG/5ML
40 ELIXIR ORAL
Status: DISCONTINUED | OUTPATIENT
Start: 2018-12-17 | End: 2018-12-19 | Stop reason: HOSPADM

## 2018-12-17 RX ORDER — POTASSIUM CHLORIDE 14.9 MG/ML
40 INJECTION INTRAVENOUS ONCE
Status: COMPLETED | OUTPATIENT
Start: 2018-12-17 | End: 2018-12-17

## 2018-12-17 RX ADMIN — RUFINAMIDE 1200 MG: 200 TABLET, FILM COATED ORAL at 08:49

## 2018-12-17 RX ADMIN — VALPROIC ACID 250 MG: 500 SOLUTION ORAL at 06:46

## 2018-12-17 RX ADMIN — MELATONIN TAB 3 MG 6 MG: 3 TAB at 22:43

## 2018-12-17 RX ADMIN — PHENOBARBITAL 40 MG: 20 LIQUID ORAL at 22:42

## 2018-12-17 RX ADMIN — HYDROCORTISONE SODIUM SUCCINATE 50 MG: 100 INJECTION, POWDER, FOR SOLUTION INTRAMUSCULAR; INTRAVENOUS at 10:07

## 2018-12-17 RX ADMIN — ASCORBIC ACID 1500 MG: 500 INJECTION, SOLUTION INTRAMUSCULAR; INTRAVENOUS; SUBCUTANEOUS at 03:35

## 2018-12-17 RX ADMIN — SODIUM CHLORIDE 50 ML/HR: 0.45 INJECTION, SOLUTION INTRAVENOUS at 07:30

## 2018-12-17 RX ADMIN — ISODIUM CHLORIDE 3 ML: 0.03 SOLUTION RESPIRATORY (INHALATION) at 19:24

## 2018-12-17 RX ADMIN — HYDROCORTISONE SODIUM SUCCINATE 50 MG: 100 INJECTION, POWDER, FOR SOLUTION INTRAMUSCULAR; INTRAVENOUS at 22:38

## 2018-12-17 RX ADMIN — CHLORHEXIDINE GLUCONATE 0.12% ORAL RINSE 15 ML: 1.2 LIQUID ORAL at 08:46

## 2018-12-17 RX ADMIN — FENTANYL CITRATE 25 MCG: 50 INJECTION INTRAMUSCULAR; INTRAVENOUS at 15:44

## 2018-12-17 RX ADMIN — Medication 0.6 MCG/KG/HR: at 05:00

## 2018-12-17 RX ADMIN — ASCORBIC ACID 1500 MG: 500 INJECTION, SOLUTION INTRAMUSCULAR; INTRAVENOUS; SUBCUTANEOUS at 17:41

## 2018-12-17 RX ADMIN — POTASSIUM PHOSPHATE, MONOBASIC AND POTASSIUM PHOSPHATE, DIBASIC 30 MMOL: 224; 236 INJECTION, SOLUTION INTRAVENOUS at 08:16

## 2018-12-17 RX ADMIN — LEVOCARNITINE 750 MG: 1 SOLUTION ORAL at 22:41

## 2018-12-17 RX ADMIN — AZTREONAM 1000 MG: 1 INJECTION, POWDER, LYOPHILIZED, FOR SOLUTION INTRAMUSCULAR; INTRAVENOUS at 14:06

## 2018-12-17 RX ADMIN — TOPIRAMATE 200 MG: 100 TABLET, FILM COATED ORAL at 22:46

## 2018-12-17 RX ADMIN — ENOXAPARIN SODIUM 40 MG: 40 INJECTION SUBCUTANEOUS at 08:46

## 2018-12-17 RX ADMIN — Medication 0.4 MCG/KG/HR: at 20:32

## 2018-12-17 RX ADMIN — METRONIDAZOLE 500 MG: 500 INJECTION, SOLUTION INTRAVENOUS at 22:29

## 2018-12-17 RX ADMIN — MAGNESIUM SULFATE HEPTAHYDRATE 1 G: 1 INJECTION, SOLUTION INTRAVENOUS at 08:37

## 2018-12-17 RX ADMIN — LEVOCARNITINE 750 MG: 1 SOLUTION ORAL at 17:50

## 2018-12-17 RX ADMIN — VANCOMYCIN HYDROCHLORIDE 1750 MG: 750 INJECTION, POWDER, LYOPHILIZED, FOR SOLUTION INTRAVENOUS at 17:58

## 2018-12-17 RX ADMIN — LEVETIRACETAM 1000 MG: 100 SOLUTION ORAL at 08:47

## 2018-12-17 RX ADMIN — VALPROIC ACID 250 MG: 500 SOLUTION ORAL at 14:06

## 2018-12-17 RX ADMIN — HYDROCORTISONE SODIUM SUCCINATE 50 MG: 100 INJECTION, POWDER, FOR SOLUTION INTRAMUSCULAR; INTRAVENOUS at 17:45

## 2018-12-17 RX ADMIN — LEVALBUTEROL 1.25 MG: 1.25 SOLUTION, CONCENTRATE RESPIRATORY (INHALATION) at 13:06

## 2018-12-17 RX ADMIN — CEFEPIME HYDROCHLORIDE 2000 MG: 2 INJECTION, POWDER, FOR SOLUTION INTRAVENOUS at 01:00

## 2018-12-17 RX ADMIN — RUFINAMIDE 1200 MG: 200 TABLET, FILM COATED ORAL at 17:50

## 2018-12-17 RX ADMIN — TOPIRAMATE 200 MG: 100 TABLET, FILM COATED ORAL at 08:50

## 2018-12-17 RX ADMIN — ISODIUM CHLORIDE 3 ML: 0.03 SOLUTION RESPIRATORY (INHALATION) at 07:26

## 2018-12-17 RX ADMIN — ASCORBIC ACID 1500 MG: 500 INJECTION, SOLUTION INTRAMUSCULAR; INTRAVENOUS; SUBCUTANEOUS at 23:31

## 2018-12-17 RX ADMIN — POTASSIUM CHLORIDE 40 MEQ: 200 INJECTION, SOLUTION INTRAVENOUS at 08:16

## 2018-12-17 RX ADMIN — VALPROIC ACID 250 MG: 500 SOLUTION ORAL at 22:43

## 2018-12-17 RX ADMIN — LIDOCAINE HYDROCHLORIDE 100 MG: 20 INJECTION, SOLUTION EPIDURAL; INFILTRATION; INTRACAUDAL; PERINEURAL at 16:08

## 2018-12-17 RX ADMIN — PERAMPANEL 8 MG: 4 TABLET ORAL at 22:43

## 2018-12-17 RX ADMIN — METRONIDAZOLE 500 MG: 500 INJECTION, SOLUTION INTRAVENOUS at 06:46

## 2018-12-17 RX ADMIN — CEFEPIME HYDROCHLORIDE 2000 MG: 2 INJECTION, POWDER, FOR SOLUTION INTRAVENOUS at 12:43

## 2018-12-17 RX ADMIN — CHLORHEXIDINE GLUCONATE 0.12% ORAL RINSE 15 ML: 1.2 LIQUID ORAL at 22:42

## 2018-12-17 RX ADMIN — Medication 0.5 MCG/KG/HR: at 10:07

## 2018-12-17 RX ADMIN — LACTULOSE 30 G: 10 SOLUTION ORAL at 22:41

## 2018-12-17 RX ADMIN — ASCORBIC ACID 1500 MG: 500 INJECTION, SOLUTION INTRAMUSCULAR; INTRAVENOUS; SUBCUTANEOUS at 10:07

## 2018-12-17 RX ADMIN — HYDROCORTISONE SODIUM SUCCINATE 50 MG: 100 INJECTION, POWDER, FOR SOLUTION INTRAMUSCULAR; INTRAVENOUS at 04:23

## 2018-12-17 RX ADMIN — SODIUM CHLORIDE 500 ML: 0.9 INJECTION, SOLUTION INTRAVENOUS at 03:35

## 2018-12-17 RX ADMIN — METRONIDAZOLE 500 MG: 500 INJECTION, SOLUTION INTRAVENOUS at 14:08

## 2018-12-17 RX ADMIN — LEVALBUTEROL 1.25 MG: 1.25 SOLUTION, CONCENTRATE RESPIRATORY (INHALATION) at 19:24

## 2018-12-17 RX ADMIN — LEVALBUTEROL 1.25 MG: 1.25 SOLUTION, CONCENTRATE RESPIRATORY (INHALATION) at 07:26

## 2018-12-17 RX ADMIN — THIAMINE HYDROCHLORIDE 200 MG: 100 INJECTION, SOLUTION INTRAMUSCULAR; INTRAVENOUS at 10:07

## 2018-12-17 RX ADMIN — Medication 0.4 MCG/KG/HR: at 17:59

## 2018-12-17 RX ADMIN — LEVETIRACETAM 1000 MG: 100 SOLUTION ORAL at 22:42

## 2018-12-17 RX ADMIN — ISODIUM CHLORIDE 3 ML: 0.03 SOLUTION RESPIRATORY (INHALATION) at 13:06

## 2018-12-17 RX ADMIN — AZTREONAM 1000 MG: 1 INJECTION, POWDER, LYOPHILIZED, FOR SOLUTION INTRAMUSCULAR; INTRAVENOUS at 22:33

## 2018-12-17 RX ADMIN — THIAMINE HYDROCHLORIDE 200 MG: 100 INJECTION, SOLUTION INTRAMUSCULAR; INTRAVENOUS at 22:36

## 2018-12-17 RX ADMIN — LEVOCARNITINE 750 MG: 1 SOLUTION ORAL at 08:47

## 2018-12-17 RX ADMIN — VANCOMYCIN HYDROCHLORIDE 1750 MG: 750 INJECTION, POWDER, LYOPHILIZED, FOR SOLUTION INTRAVENOUS at 05:35

## 2018-12-17 RX ADMIN — LACTULOSE 30 G: 10 SOLUTION ORAL at 17:50

## 2018-12-17 RX ADMIN — LACTULOSE 30 G: 10 SOLUTION ORAL at 08:46

## 2018-12-17 RX ADMIN — LIDOCAINE HYDROCHLORIDE 10 ML: 10 INJECTION, SOLUTION INFILTRATION; PERINEURAL at 14:45

## 2018-12-17 NOTE — PROGRESS NOTES
2040 was called to bedside by RN Leonides Galdamez, she expressed concerns of changes on the continuous cardiac monitoring  EKG 12 lead EKG was collected suspicious of STEMI in lead V3 V4 and V5  Cardiologists Dr Surendra Brown was called to review EKG and for Cardiology specially recommendation  Dr Aj Peraza  After reviewing 12 lead EKG concluded that he did not feel that this was a STEMI and is leaning toward pericarditis  Dr Garcia Echavarria, attending was contacted by myself  The aforementioned was relayed to her  Will continue current treatment plan  Troponins and other labs are pending at present  Preliminary potassium was 2 6 and will be repleted with 60 mEq of potassium IVPB  Awaiting final results of the aforementioned labs  Will trend troponin with EKGs  Continue current level of care

## 2018-12-17 NOTE — PROGRESS NOTES
Vancomycin IV Pharmacy-to-Dose Consultation    Lynn Zurita is a 41 y o  female who is currently receiving Vancomycin IV with management by the Pharmacy Consult service  Assessment/Plan:  The patient was reviewed  Renal function is stable and no signs or symptoms of nephrotoxicity and/or infusion reactions were documented in the chart  Based on todays assessment, continue current vancomycin (day # 4) dosing of 1750mg q12h, with a plan for trough to be drawn at 1630 on 12/18/18  We will continue to follow the patients culture results and clinical progress daily      Debora Tay, Pharmacist

## 2018-12-17 NOTE — ASSESSMENT & PLAN NOTE
Monitor electrolytes in place as needed, patient with severe hypophosphatemia and hypomagnesemia, also with hypokalemia

## 2018-12-17 NOTE — CONSULTS
Pulmonary Consultation   Shell Dias 41 y o  female MRN: [id number]  Unit/Bed#:  Encounter: [id number]      Reason for consultation:  Pneumonia, sepsis, pleural effusion    Requesting physician: Dr Daisy Gupta    Impressions/Recommendations:     · Acute hypoxemic respiratory failure due to aspiration pneumonia with left pleural effusion, likely parapneumonic - r/o empyema  Chest CT also concerning for significant left lower lobe atelectasis with mucus plugging  Continue current ventilator settings  Would not attempt weaning until after pleural effusion has been addressed and secretions have improved  Continue vest therapy 3 times daily with pulmonary toilet  Continue broad-spectrum antibiotics  Continue Marik protocol with Vit C, thiamine and steroids  Follow-up culture data  Discussed with interventional Radiology  They will perform a diagnostic and therapeutic thoracentesis  I will also perform bronchoscopy to assist with sputum clearance and improve ventilator mechanics  Follow-up chest x-ray tomorrow  · Encephalopathy due to baseline seizure disorder and superimposed toxic encephalopathy - currently sedated on Precedex  Once she is more appropriate for ventilator weaning, we will need to withhold sedation  Mental status will be difficult to follow with given history  No overt seizure activity  · Severe sepsis with septic shock - now off vasopressors  Continue antibiotics and Marik protocol as above      History of Present Illness   HPI:  Shell Dias is a 41 y o  female who has history of seizure disorder, admitted from nursing facility with multiple episodes of vomiting and respiratory failure  She was initially placed on Vapotherm, however required intubation on 12/15/18 due to increased work of breathing  Patient is unable to provide any history  All the information obtained chart review and discussion with respiratory therapy and Dr Daisy Gupta    Oxygen needs have improved since intubation  She still has copious secretions  She was briefly on vasopressors, requiring norepinephrine for approximately 12 hr  Those have since been weaned off  Review of systems:  Unobtainable due to mental status    Historical Information   Past Medical History:   Diagnosis Date    ADHD     Anoxic brain damage (HCC)     Autistic disorder     Hyperammonemia (HCC)     Hyperkeratosis     Hypotension     Intellectual disability     Intellectual disability     Lennox-Gastaut syndrome with tonic seizures (HCC)     Lethargy     Liver enzyme elevation     Onychomycosis     Osteoporosis     Osteoporosis     Psychiatric disorder     anxiety     Past Surgical History:   Procedure Laterality Date    ABDOMINAL SURGERY      CARDIAC PACEMAKER PLACEMENT      JEJUNOSTOMY FEEDING TUBE      PEG TUBE PLACEMENT       History reviewed  No pertinent family history      Tobacco history:  Unobtainable due to mental status    Family history:  Unobtainable due to mental status    Meds/Allergies   Current Facility-Administered Medications   Medication Dose Route Frequency    albuterol inhalation solution 2 5 mg  2 5 mg Nebulization Q4H PRN    ascorbic acid 1,500 mg in sodium chloride 0 9 % 100 mL IVPB  1,500 mg Intravenous Q6H    cefepime (MAXIPIME) 2,000 mg in dextrose 5 % 50 mL IVPB  2,000 mg Intravenous Q12H    chlorhexidine (PERIDEX) 0 12 % oral rinse 15 mL  15 mL Swish & Spit Q12H [facility]    dexmedetomidine (PRECEDEX) 200 mcg in sodium chloride 0 9 % 50 mL infusion  0 1-1 4 mcg/kg/hr Intravenous Titrated    enoxaparin (LOVENOX) subcutaneous injection 40 mg  40 mg Subcutaneous Q24H NANDO    hydrocortisone sodium succinate (PF) (Solu-CORTEF) injection 50 mg  50 mg Intravenous Q6H [facility]    lactulose 20 g/30 mL oral solution 30 g  30 g Per G Tube TID    levalbuterol (XOPENEX) inhalation solution 1 25 mg  1 25 mg Nebulization TID    levETIRAcetam (KEPPRA) oral solution 1,000 mg  1,000 mg Per G Tube Q12H [facility]    levOCARNitine (CARNITOR) oral solution 750 mg  750 mg Per G Tube TID    magnesium sulfate IVPB (premix) SOLN 1 g  1 g Intravenous Once    melatonin tablet 6 mg  6 mg Per G Tube HS    metroNIDAZOLE (FLAGYL) IVPB (premix) 500 mg  500 mg Intravenous Q8H [address]    norepinephrine (LEVOPHED) 4 mg (STANDARD CONCENTRATION) IV in sodium chloride 0 9% 250 mL  1-30 mcg/min Intravenous Titrated    ondansetron (ZOFRAN) injection 4 mg  4 mg Intravenous Q4H PRN    Perampanel tablet 8 mg  8 mg Per NG Tube HS    PHENobarbital oral elixir 60 mg  60 mg Oral HS    potassium chloride 20 mEq IVPB (premix)  40 mEq Intravenous Once    potassium phosphate 30 mmol in sodium chloride 0 9 % 250 mL infusion  30 mmol Intravenous Once    rufinamide (BANZEL) tablet 1,200 mg  1,200 mg Per G Tube BID    sodium chloride 0 9 % inhalation solution 3 mL  3 mL Nebulization TID    sodium chloride infusion 0 45 %  50 mL/hr Intravenous Continuous    thiamine (VITAMIN B1) 200 mg in sodium chloride 0 9 % 50 mL IVPB  200 mg Intravenous Q12H    topiramate (TOPAMAX) tablet 200 mg  200 mg Oral Q12H [address]    valproic acid (DEPAKENE) 250 MG/5ML oral soln 250 mg  250 mg Per G Tube Q8H    vancomycin (VANCOCIN) 1,750 mg in sodium chloride 0 9 % 500 mL IVPB  1,750 mg Intravenous Q12H     Facility-Administered Medications Ordered in Other Encounters   Medication Dose Route Frequency    propofol (DIPRIVAN) 200 MG/20ML bolus injection   Intravenous PRN    vecuronium (NORCURON) injection    PRN     Allergies   Allergen Reactions    Felbamate        Vitals: Blood pressure 104/70, pulse 99, temperature (!) 96 6 °F (35 9 °C), temperature source Temporal, resp  rate 14, height 5' (1 524 m), weight 58 kg (127 lb 13 9 oz), SpO2 94 % , AC vent 14 / 400 / +7 / 24%, Body mass index is 24 97 kg/m²      Intake/Output Summary (Last 24 hours) at 12/17/18 0934  Last data filed at 12/17/18 0721   Gross per 24 hour   Intake          4699 13 ml   Output             2610 ml Net          2089 13 ml       Physical exam:    General Appearance:    Sedated, intubated   Head/eyes:    Normocephalic, without obvious abnormality, atraumatic,         PERRL, extraocular muscles intact, no scleral icterus    Nose:   Nares normal, septum midline, mucosa normal, no drainage    or sinus tenderness   Throat:   Moist mucous membranes, no thrush   Neck:   Supple, trachea midline, no adenopathy; no carotid    bruit or JVD   Lungs:     Coarse bilateral rhonchi   Chest Wall:    No tenderness or deformity    Heart:    Regular rate and rhythm, S1 and S2 normal, no murmur, rub   or gallop   Abdomen:     Soft, non-tender, bowel sounds active all four quadrants,     no masses, no organomegaly   Extremities:   Extremities normal, atraumatic, no cyanosis or edema   Skin:   Warm, dry, turgor normal, no rashes or lesions   Lymph nodes:   Cervical and supraclavicular nodes normal   Neurologic:   Sedated  :  Woods catheter in place     Labs: I have personally reviewed pertinent lab results  , AB     Results from last 7 days  Lab Units 18  0528 18  0632 18  0205   WBC Thousand/uL 20 37* 22 32* 21 37*   HEMOGLOBIN g/dL 9 4* 9 8* 10 2*   HEMATOCRIT % 28 3* 29 8* 30 7*   PLATELETS Thousands/uL 224 208 225           Results from last 7 days  Lab Units 18  0528 188 18  0632  18  1244   POTASSIUM mmol/L 2 8* 2 4* 3 7  < >  --    CHLORIDE mmol/L 117* 116* 117*  < >  --    CO2 mmol/L 21 18* 18*  < >  --    CO2, I-STAT mmol/L  --   --   --   --  19*   BUN mg/dL 10 12 10  < >  --    CREATININE mg/dL 0 32* 0 48* 0 38*  < >  --    CALCIUM mg/dL 7 3* 6 8* 6 7*  < >  --    ALK PHOS U/L 124* 133* 117*  < >  --    ALT U/L 55 58 54  < >  --    AST U/L 44 40 43  < >  --    GLUCOSE, ISTAT mg/dl  --   --   --   --  123   < > = values in this interval not displayed      Results from last 7 days  Lab Units 18  2228 12/15/18  0645 18  0659   INR  1 44* 1 57* 1 37* PTT seconds  --   --  34       Results from last 7 days  Lab Units 12/17/18  0528 12/16/18 2028 12/16/18  9840   MAGNESIUM mg/dL 1 8 2 4 1 4*     Lactic acid 3 1  Procalcitonin 4 2  Influenza negative  Blood cultures no growth today    Imaging and other studies: I have personally reviewed pertinent reports  and I have personally reviewed pertinent films in PACS chest x-ray shows bilateral infiltrates, left greater than right with left pleural effusion    Chest CT shows bilateral, left greater than right pleural effusions  There is left-sided infiltrate and atelectasis  There are multiple nodules in the right lung measuring up to 5 mm in size  Code Status: Level 1 - Full Code    Thank you for allowing us to participate in the care of your patient      Rosas Martinez,

## 2018-12-17 NOTE — RESPIRATORY THERAPY NOTE
Bedside Bronchoscopy was done with Dr Inge Alaniz  Pts FiO2 was increased to 100%, Bronch was done for left lung mucus plug, sputum sample was taken and set to the lab  After bronch pt was placed back on original FiO2 24% and resting comfortably

## 2018-12-17 NOTE — RESPIRATORY THERAPY NOTE
RT Ventilator Management Note  Jean-Claude Lao 41 y o  female MRN: [id number]  Unit/Bed#: [facility] 208 Encounter: [id number]      Daily Screen       12/16/2018 2324 12/17/2018 9189          Patient safety screen outcome[de-identified] - Failed      Not Ready for Weaning due to[de-identified] - Hemodynamically unstable; Underline problem not resolved      Spont breathing trial % for 30 min: - -      Spont breathing trial outcome[de-identified] - -      Spont breathing trial reason failed: - -              Physical Exam:     Pt maintained on AC on same settings as pt received on [id number]%  Per RN pt's Levophed has been on hold, but blood pressure is slowly starting to decrease  No wean at current time until blood pressure is stable  Will discuss with physician and pulmonary  Will continue to monitor

## 2018-12-17 NOTE — RESPIRATORY THERAPY NOTE
RT Ventilator Management Note  Vivian Ruvalcaba 41 y o  female MRN: [id number]  Unit/Bed#: [facility] 208 Encounter: [id number]      Daily Screen       12/16/2018 2324 12/17/2018 0728          Patient safety screen outcome[de-identified] - Failed      Not Ready for Weaning due to[de-identified] - Hemodynamically unstable; Underline problem not resolved      Spont breathing trial % for 30 min: - -      Spont breathing trial outcome[de-identified] - -      Spont breathing trial reason failed: - -              Physical Exam:     Peep decreased to 5 from 7 per Dr Cuevas Batter  No weaning per Pulmonary until pt goes to IR to have fluid removed from her lung and pneumonia improves

## 2018-12-17 NOTE — ASSESSMENT & PLAN NOTE
Acute metabolic encephalopathy persists, likely secondary to severe sepsis, patient history chronic intractable epilepsy  Anti epileptic drug regimen was reviewed in detail with the patient's primary epileptologist via telephone today, 12/17/2018  We will decrease the patient's phenobarbital to hopefully improve her mentation in anticipation of possible weaning of the ventilator  Patient's Perampanel tablet 8 mg could be increased to 10 to 12 mg if needed for any evidence of seizure activity

## 2018-12-17 NOTE — PROCEDURES
BRONCHOSCOPY NOTE   Orlinda Curling 41 y o  female MRN: [id number]  Unit/Bed#:  Encounter: [id number]      PRE OPERATIVE DIAGNOSIS:  Left lung atelectasis, aspiration pneumonia    POST OPERATIVE DIAGNOSIS:  Left lung atelectasis due to mucus plugging, aspiration pneumonia    Procedure was deemed urgent given severity of illness    LOCATION:  ICU bed 208    Consent was obtained from Dilip Greene (father)  Images reviewed prior to the procedure  Final Time Out was performed  ASA: 4    MALLAMPATI SCORE: N/A (intubated)    MONITORING:  EKG, pulse, pulse oximetry were monitored continuously during the procedure  Blood pressure was monitored every 3 minutes during the procedure  SEDATION:  Precedex infusion, fentanyl 25 mcg IV, topical lidocaine 2% (4ml)    PROCEDURE:  Standard airway preparation completed per respiratory therapy protocol  After appropriate level of conscious sedation was achieved, a standard bronchoscope was inserted thru endotracheal tube and advanced to the distal trachea  Topical lidocaine applied to gene  The bronchoscope was then advanced  into the trachea and advanced to the main gene  The trachea was normal in appearance  The main gene was normal in appearance  Airway survey was completed bilaterally to at least the second segmental level  The anatomy was normal   The mucosa was normal in appearance  There were scant mucoid secretions noted in the bilateral mainstem bronchi  Bronchial washing was performed on the left  Airways patent at conclusion of bronchoscopy  There were no endobronchial masses, lesions, or obstructions noted  Bronchoscope remained within the lumen of the airway throughout the entire procedure  Bronchoscope removed  FINDINGS:  Scant mucoid secretions noted in bilateral mainstem bronchi  Airways patent  Bronchial washing performed in left mainstem bronchus    COMPLICATIONS:  None  There were no unplanned events      ESTIMATED BLOOD LOSS: None    RECOMMENDATIONS:  Follow up cultures      Carol Cerrato DO

## 2018-12-17 NOTE — ASSESSMENT & PLAN NOTE
Patient is reported to have significant hypoxia by EMS, oxygen level corrected with the application of oxygen, patient developed increasing work of breathing despite Vapotherm, patient intubated today 12/15/2018      Continue with current ventilator settings

## 2018-12-17 NOTE — PLAN OF CARE
Problem: Nutrition/Hydration-ADULT  Goal: Nutrient/Hydration intake appropriate for improving, restoring or maintaining nutritional needs  Monitor and assess patient's nutrition/hydration status for malnutrition (ex- brittle hair, bruises, dry skin, pale skin and conjunctiva, muscle wasting, smooth red tongue, and disorientation)  Collaborate with interdisciplinary team and initiate plan and interventions as ordered  Monitor patient's weight and dietary intake as ordered or per policy  Utilize nutrition screening tool and intervene per policy  Determine patient's food preferences and provide high-protein, high-caloric foods as appropriate  INTERVENTIONS:  - Monitor oral intake, urinary output, labs, and treatment plans  - Assess nutrition and hydration status and recommend course of action  - Evaluate amount of meals eaten  - Assist patient with eating if necessary   - Allow adequate time for meals  - Recommend/ encourage appropriate diets, oral nutritional supplements, and vitamin/mineral supplements  - Order, calculate, and assess calorie counts as needed  - Recommend, monitor, and adjust tube feedings and TPN/PPN based on assessed needs  - Assess need for intravenous fluids  - Provide specific nutrition/hydration education as appropriate  - Include patient/family/caregiver in decisions related to nutrition   Outcome: Progressing  Continue Jevity 1 2 @ 40mL/hr continuous with 100mL free H20 flush q6hrs via PEG tube  Nutrition services will follow up 12/18/18 to check TF tolerance and assess possible TF increase  Please see nutrition assessment dated 12/17/18 for additional details

## 2018-12-17 NOTE — ASSESSMENT & PLAN NOTE
Case discussed with Pulmonary Medicine, case discussed with interventional radiologist     IR to perform bedside ultrasound and evaluate for possible thoracentesis today

## 2018-12-17 NOTE — PROGRESS NOTES
Progress Note - Jeffery Powell 1980, 41 y o  female MRN: [id number]    Unit/Bed#:  Encounter: [id number]    Primary Care Provider: Eva Iglesias DO   Date and time admitted to hospital: 12/14/2018  5:44 AM        * Severe sepsis with septic shock (CODE) ([facility] )   Assessment & Plan    Severe sepsis, present on admission,  secondary to pneumonia, suspected aspiration pneumonia, patient also with history of MRSA colonization, will continue broad-spectrum antibiotics cefepime, vancomycin, Flagyl today is day 4 of antibiotic therapy  Follow up cultures  Patient developed increased respiratory rate, increased work of breathing, on Saturday 12/15/2018 CT of the chest shows left pleural effusion, essentially left lung is ryley out, patient intubated 12/15/2018    Patient developed hypotension on 12/16/2018, required Levophed overnight, that has been weaned off with resolution of hypertension  Chest x-ray today shows worsening pleural effusions, IR to perform bedside ultrasound and attempt thoracentesis  Tube feeds started 12/15/2018     Pleural effusion   Assessment & Plan    Case discussed with Pulmonary Medicine, case discussed with interventional radiologist     IR to perform bedside ultrasound and evaluate for possible thoracentesis today  Aspiration pneumonia [facility])   Assessment & Plan    Suspected aspiration pneumonia, possible gram-negative pneumonia, healthcare associated/possible Staph aureus pneumonia    Procalcitonin level was elevated, continue to trend, continue current broad-spectrum antibiotics     Acute respiratory failure with hypoxia [facility])   Assessment & Plan    Patient is reported to have significant hypoxia by EMS, oxygen level corrected with the application of oxygen, patient developed increasing work of breathing despite Vapotherm, patient intubated today 12/15/2018      Continue with current ventilator settings         Intractable epilepsy without status epilepticus ([facility] ) Assessment & Plan    Chronic condition, continue multiple medications patients complex anti epileptic drug regimen     Acute encephalopathy   Assessment & Plan    Acute metabolic encephalopathy persists, likely secondary to severe sepsis, patient history chronic intractable epilepsy  Anti epileptic drug regimen was reviewed in detail with the patient's primary epileptologist via telephone today, 12/17/2018  We will decrease the patient's phenobarbital to hopefully improve her mentation in anticipation of possible weaning of the ventilator  Patient's Perampanel tablet 8 mg could be increased to 10 to 12 mg if needed for any evidence of seizure activity  Lactic acidosis   Assessment & Plan    Persistently elevated, continue supportive care, can follow up daily  Lennox-Gastaut syndrome with tonic seizures (HCC)   Assessment & Plan    Plan as above for history of seizures     Hyperammonemia (HCC)   Assessment & Plan    Chronic condition, continue lactulose     Underweight   Assessment & Plan    Continue tube feeds, consult to Nutrition     Transaminitis   Assessment & Plan    Chronic     Electrolyte abnormality   Assessment & Plan    Monitor electrolytes in place as needed, patient with severe hypophosphatemia and hypomagnesemia, also with hypokalemia         VTE Pharmacologic Prophylaxis:   Pharmacologic: Enoxaparin (Lovenox)  Mechanical VTE Prophylaxis in Place: Yes    Patient Centered Rounds: I have performed bedside rounds with nursing staff today      Discussions with Specialists or Other Care Team Provider:  Case discussed with Pulmonary Medicine, case reviewed with patient's epileptologist via phone    Case discussed with Interventional Radiology today    Education and Discussions with Family / Patient:  Family updated with plan of care    Time Spent for Care:   Critical care time 45 minutes    Current Length of Stay: 3 day(s)    Current Patient Status: Inpatient   Certification Statement: The patient will continue to require additional inpatient hospital stay due to Multiple metabolic abnormalities, acute respiratory failure, acute infection as noted above    Discharge Plan / Estimated Discharge Date:  No discharge date planned, to be determined      Code Status: Level 1 - Full Code      Subjective:   Patient was hemodynamically stable overnight, patient did have some notable EKG changes, cardiac enzymes are negative, echocardiogram unremarkable  Objective:     Vitals:   Temp (24hrs), Av 7 °F (35 9 °C), Min:93 8 °F (34 3 °C), Max:98 1 °F (36 7 °C)    Temp:  [93 8 °F (34 3 °C)-98 1 °F (36 7 °C)] 97 1 °F (36 2 °C)  HR:  [] 87  Resp:  [14-22] 22  BP: ()/(60-83) 98/70  SpO2:  [93 %-98 %] 96 %  Body mass index is 24 8 kg/m²  Input and Output Summary (last 24 hours): Intake/Output Summary (Last 24 hours) at 18 1301  Last data filed at 18 0800   Gross per 24 hour   Intake          3475 63 ml   Output             2485 ml   Net           990 63 ml       Physical Exam:     Physical Exam   Constitutional:   Chronically ill-appearing female, no acute distress, patient currently sedated on ventilator   HENT:   Head: Normocephalic  Cardiovascular: Normal rate  Pulmonary/Chest: She has no wheezes  She exhibits no tenderness  Increased upper airway rhonchi  Abdominal: Soft  Bowel sounds are normal  She exhibits no distension  There is no tenderness  Musculoskeletal: Normal range of motion  Nursing note and vitals reviewed        Additional Data:     Labs:      Results from last 7 days  Lab Units 18  0528   WBC Thousand/uL 20 37*   HEMOGLOBIN g/dL 9 4*   HEMATOCRIT % 28 3*   PLATELETS Thousands/uL 224   NEUTROS PCT % 83*   LYMPHS PCT % 9*   MONOS PCT % 6   EOS PCT % 0       Results from last 7 days  Lab Units 18  0528  18  1244   POTASSIUM mmol/L 2 8*  < >  --    CHLORIDE mmol/L 117*  < >  --    CO2 mmol/L 21  < >  --    CO2, I-STAT mmol/L  -- --  19*   BUN mg/dL 10  < >  --    CREATININE mg/dL 0 32*  < >  --    CALCIUM mg/dL 7 3*  < >  --    ALK PHOS U/L 124*  < >  --    ALT U/L 55  < >  --    AST U/L 44  < >  --    GLUCOSE, ISTAT mg/dl  --   --  123   < > = values in this interval not displayed  Results from last 7 days  Lab Units 12/16/18  2228   INR  1 44*       * I Have Reviewed All Lab Data Listed Above  * Additional Pertinent Lab Tests Reviewed: All [facility] Admission Reviewed      Recent Cultures (last 7 days):       Results from last 7 days  Lab Units 12/14/18  0749 12/14/18  0718 12/14/18  0659   BLOOD CULTURE   --  No Growth at 72 hrs  No Growth at 72 hrs     INFLUENZA B PCR  None Detected  --   --    RSV PCR  None Detected  --   --        Last 24 Hours Medication List:     Current Facility-Administered Medications:  albuterol 2 5 mg Nebulization Q4H PRN Jessenia Moreland MD    ascorbic acid in sodium chloride 0 9% 100 mL IVPB 1,500 mg Intravenous Q6H Xavier Mena DO Last Rate: Stopped (12/17/18 1037)   aztreonam 1,000 mg Intravenous Q8H Xavier Mena, DO    chlorhexidine 15 mL Swish & Spit Q12H [facility] Sund Rajesh, DO    dexmedetomidine (PRECEDEX) 200 mcg in 50 ml sodium chloride 0 9% 0 1-1 4 mcg/kg/hr Intravenous Titrated Howard Monteiro PA-C Last Rate: 0 3 mcg/kg/hr (12/17/18 1244)   enoxaparin 40 mg Subcutaneous Q24H [facility] Sundra Rajesh, DO    hydrocortisone sodium succinate 50 mg Intravenous Q6H [facility] Rey Barron PA-C    lactulose 30 g Per G Tube TID Xavier Mena DO    levalbuterol 1 25 mg Nebulization TID Jessenia Moreland MD    levETIRAcetam 1,000 mg Per G Tube Q12H [facility] Xavier Mena DO    levOCARNitine 750 mg Per G Tube TID Sundra Manzanilla, DO    melatonin 6 mg Per G Tube HS Xavier Mena DO    metroNIDAZOLE 500 mg Intravenous [facility] Xavier Mena DO Last Rate: 500 mg (12/17/18 0646)   norepinephrine 1-30 mcg/min Intravenous Titrated Xavier Mena DO Last Rate: Stopped (12/17/18 0319)   ondansetron 4 mg Intravenous Q4H PRN Abbie Sutherland DO    Perampanel 8 mg Per NG Tube HS Abbie Sutherland DO    PHENobarbital 40 mg Oral HS Abbie Sutherland DO    potassium phosphate 30 mmol Intravenous Once Harmony Y Swank, CRNP Last Rate: 30 mmol (12/17/18 0816)   rufinamide 1,200 mg Per G Tube BID Tee Talbert MD    sodium chloride 3 mL Nebulization TID Tee Talbert MD    sodium chloride 50 mL/hr Intravenous Continuous Abbie Sutherland DO Last Rate: 50 mL/hr (12/17/18 0730)   thiamine 200 mg Intravenous Q12H Paula Guadarrama PA-C Last Rate: Stopped (12/17/18 1037)   topiramate 200 mg Oral Q12H [address] Abbie Sutherland DO    valproic acid 250 mg Per G Tube Q8H Abbie Sutherland DO    vancomycin 1,750 mg Intravenous Q12H Tee Talbert MD Last Rate: 1,750 mg (12/17/18 0535)     Facility-Administered Medications Ordered in Other Encounters:  propofol  Intravenous PRN Nancy Liu CRNA   vecuronium   PRN Nancy Liu CRNA        Today, Patient Was Seen By: Abbie Sutherland DO

## 2018-12-18 ENCOUNTER — APPOINTMENT (INPATIENT)
Dept: RADIOLOGY | Facility: HOSPITAL | Age: 38
DRG: 871 | End: 2018-12-18
Payer: MEDICARE

## 2018-12-18 PROBLEM — R19.7 DIARRHEA: Status: ACTIVE | Noted: 2018-12-18

## 2018-12-18 PROBLEM — E83.39 HYPOPHOSPHATEMIA: Status: ACTIVE | Noted: 2018-12-18

## 2018-12-18 PROBLEM — G40.909 SEIZURE DISORDER (HCC): Status: ACTIVE | Noted: 2018-12-18

## 2018-12-18 LAB
ALBUMIN SERPL BCP-MCNC: 1.5 G/DL (ref 3.5–5)
ALP SERPL-CCNC: 149 U/L (ref 46–116)
ALT SERPL W P-5'-P-CCNC: 68 U/L (ref 12–78)
ANION GAP SERPL CALCULATED.3IONS-SCNC: 11 MMOL/L (ref 4–13)
ANISOCYTOSIS BLD QL SMEAR: PRESENT
AST SERPL W P-5'-P-CCNC: 76 U/L (ref 5–45)
BASOPHILS # BLD MANUAL: 0 THOUSAND/UL (ref 0–0.1)
BASOPHILS NFR MAR MANUAL: 0 % (ref 0–1)
BILIRUB SERPL-MCNC: 0.4 MG/DL (ref 0.2–1)
BUN SERPL-MCNC: 8 MG/DL (ref 5–25)
CALCIUM SERPL-MCNC: 7.3 MG/DL (ref 8.3–10.1)
CHLORIDE SERPL-SCNC: 114 MMOL/L (ref 100–108)
CO2 SERPL-SCNC: 21 MMOL/L (ref 21–32)
CREAT SERPL-MCNC: 0.39 MG/DL (ref 0.6–1.3)
EOSINOPHIL # BLD MANUAL: 0 THOUSAND/UL (ref 0–0.4)
EOSINOPHIL NFR BLD MANUAL: 0 % (ref 0–6)
ERYTHROCYTE [DISTWIDTH] IN BLOOD BY AUTOMATED COUNT: 15.4 % (ref 11.6–15.1)
GFR SERPL CREATININE-BSD FRML MDRD: 135 ML/MIN/1.73SQ M
GLUCOSE SERPL-MCNC: 140 MG/DL (ref 65–140)
GLUCOSE SERPL-MCNC: 162 MG/DL (ref 65–140)
GLUCOSE SERPL-MCNC: 164 MG/DL (ref 65–140)
GLUCOSE SERPL-MCNC: 181 MG/DL (ref 65–140)
GLUCOSE SERPL-MCNC: 211 MG/DL (ref 65–140)
HCT VFR BLD AUTO: 28.2 % (ref 34.8–46.1)
HGB BLD-MCNC: 9.3 G/DL (ref 11.5–15.4)
HYPERCHROMIA BLD QL SMEAR: PRESENT
INR PPP: 1.46 (ref 0.86–1.17)
LYMPHOCYTES # BLD AUTO: 16 % (ref 14–44)
LYMPHOCYTES # BLD AUTO: 2.32 THOUSAND/UL (ref 0.6–4.47)
MAGNESIUM SERPL-MCNC: 1.9 MG/DL (ref 1.6–2.6)
MCH RBC QN AUTO: 33.7 PG (ref 26.8–34.3)
MCHC RBC AUTO-ENTMCNC: 33 G/DL (ref 31.4–37.4)
MCV RBC AUTO: 102 FL (ref 82–98)
MONOCYTES # BLD AUTO: 0.73 THOUSAND/UL (ref 0–1.22)
MONOCYTES NFR BLD: 5 % (ref 4–12)
MYELOCYTES NFR BLD MANUAL: 1 % (ref 0–1)
NEUTROPHILS # BLD MANUAL: 11.32 THOUSAND/UL (ref 1.85–7.62)
NEUTS BAND NFR BLD MANUAL: 1 % (ref 0–8)
NEUTS SEG NFR BLD AUTO: 77 % (ref 43–75)
NRBC BLD AUTO-RTO: 0 /100 WBCS
NRBC BLD AUTO-RTO: 1 /100 WBC (ref 0–2)
PHOSPHATE SERPL-MCNC: 2.3 MG/DL (ref 2.7–4.5)
PLATELET # BLD AUTO: 222 THOUSANDS/UL (ref 149–390)
PLATELET BLD QL SMEAR: ADEQUATE
PMV BLD AUTO: 11.1 FL (ref 8.9–12.7)
POTASSIUM SERPL-SCNC: 2.7 MMOL/L (ref 3.5–5.3)
PROCALCITONIN SERPL-MCNC: 1.72 NG/ML
PROT SERPL-MCNC: 4.3 G/DL (ref 6.4–8.2)
PROTHROMBIN TIME: 17 SECONDS (ref 11.8–14.2)
RBC # BLD AUTO: 2.76 MILLION/UL (ref 3.81–5.12)
SODIUM SERPL-SCNC: 146 MMOL/L (ref 136–145)
TOTAL CELLS COUNTED SPEC: 100
VANCOMYCIN TROUGH SERPL-MCNC: 21 UG/ML (ref 10–20)
WBC # BLD AUTO: 14.51 THOUSAND/UL (ref 4.31–10.16)

## 2018-12-18 PROCEDURE — 80074 ACUTE HEPATITIS PANEL: CPT | Performed by: INTERNAL MEDICINE

## 2018-12-18 PROCEDURE — 82948 REAGENT STRIP/BLOOD GLUCOSE: CPT

## 2018-12-18 PROCEDURE — 83735 ASSAY OF MAGNESIUM: CPT | Performed by: INTERNAL MEDICINE

## 2018-12-18 PROCEDURE — 99291 CRITICAL CARE FIRST HOUR: CPT | Performed by: INTERNAL MEDICINE

## 2018-12-18 PROCEDURE — 84145 PROCALCITONIN (PCT): CPT | Performed by: INTERNAL MEDICINE

## 2018-12-18 PROCEDURE — 87081 CULTURE SCREEN ONLY: CPT | Performed by: INTERNAL MEDICINE

## 2018-12-18 PROCEDURE — 87147 CULTURE TYPE IMMUNOLOGIC: CPT | Performed by: INTERNAL MEDICINE

## 2018-12-18 PROCEDURE — 94669 MECHANICAL CHEST WALL OSCILL: CPT

## 2018-12-18 PROCEDURE — 85007 BL SMEAR W/DIFF WBC COUNT: CPT | Performed by: INTERNAL MEDICINE

## 2018-12-18 PROCEDURE — 84100 ASSAY OF PHOSPHORUS: CPT | Performed by: INTERNAL MEDICINE

## 2018-12-18 PROCEDURE — 85610 PROTHROMBIN TIME: CPT | Performed by: INTERNAL MEDICINE

## 2018-12-18 PROCEDURE — 85027 COMPLETE CBC AUTOMATED: CPT | Performed by: INTERNAL MEDICINE

## 2018-12-18 PROCEDURE — 80202 ASSAY OF VANCOMYCIN: CPT | Performed by: FAMILY MEDICINE

## 2018-12-18 PROCEDURE — 94640 AIRWAY INHALATION TREATMENT: CPT

## 2018-12-18 PROCEDURE — 71045 X-RAY EXAM CHEST 1 VIEW: CPT

## 2018-12-18 PROCEDURE — 94003 VENT MGMT INPAT SUBQ DAY: CPT

## 2018-12-18 PROCEDURE — 80053 COMPREHEN METABOLIC PANEL: CPT | Performed by: INTERNAL MEDICINE

## 2018-12-18 PROCEDURE — 94760 N-INVAS EAR/PLS OXIMETRY 1: CPT

## 2018-12-18 RX ORDER — POTASSIUM CHLORIDE 14.9 MG/ML
40 INJECTION INTRAVENOUS ONCE
Status: COMPLETED | OUTPATIENT
Start: 2018-12-18 | End: 2018-12-18

## 2018-12-18 RX ORDER — MINERAL OIL AND PETROLATUM 150; 830 MG/G; MG/G
OINTMENT OPHTHALMIC 4 TIMES DAILY
Status: DISCONTINUED | OUTPATIENT
Start: 2018-12-18 | End: 2018-12-19 | Stop reason: HOSPADM

## 2018-12-18 RX ORDER — DEXTROSE AND SODIUM CHLORIDE 5; .2 G/100ML; G/100ML
50 INJECTION, SOLUTION INTRAVENOUS CONTINUOUS
Status: DISCONTINUED | OUTPATIENT
Start: 2018-12-18 | End: 2018-12-18

## 2018-12-18 RX ORDER — LORAZEPAM 2 MG/ML
0.5 INJECTION INTRAMUSCULAR EVERY 2 HOUR PRN
Status: DISCONTINUED | OUTPATIENT
Start: 2018-12-18 | End: 2018-12-19 | Stop reason: HOSPADM

## 2018-12-18 RX ORDER — PROPOFOL 10 MG/ML
5-50 INJECTION, EMULSION INTRAVENOUS
Status: DISCONTINUED | OUTPATIENT
Start: 2018-12-18 | End: 2018-12-19

## 2018-12-18 RX ORDER — DEXTROSE MONOHYDRATE 25 G/50ML
25 INJECTION, SOLUTION INTRAVENOUS AS NEEDED
Status: DISCONTINUED | OUTPATIENT
Start: 2018-12-18 | End: 2018-12-19 | Stop reason: HOSPADM

## 2018-12-18 RX ADMIN — LEVOCARNITINE 750 MG: 1 SOLUTION ORAL at 21:31

## 2018-12-18 RX ADMIN — INSULIN LISPRO 1 UNITS: 100 INJECTION, SOLUTION INTRAVENOUS; SUBCUTANEOUS at 12:20

## 2018-12-18 RX ADMIN — ISODIUM CHLORIDE 3 ML: 0.03 SOLUTION RESPIRATORY (INHALATION) at 13:31

## 2018-12-18 RX ADMIN — POTASSIUM CHLORIDE 40 MEQ: 200 INJECTION, SOLUTION INTRAVENOUS at 09:58

## 2018-12-18 RX ADMIN — LEVOCARNITINE 750 MG: 1 SOLUTION ORAL at 16:29

## 2018-12-18 RX ADMIN — CHLORHEXIDINE GLUCONATE 0.12% ORAL RINSE 15 ML: 1.2 LIQUID ORAL at 21:30

## 2018-12-18 RX ADMIN — TOPIRAMATE 200 MG: 100 TABLET, FILM COATED ORAL at 21:31

## 2018-12-18 RX ADMIN — AZTREONAM 1000 MG: 1 INJECTION, POWDER, LYOPHILIZED, FOR SOLUTION INTRAMUSCULAR; INTRAVENOUS at 21:24

## 2018-12-18 RX ADMIN — PROPOFOL 5 MCG/KG/MIN: 10 INJECTION, EMULSION INTRAVENOUS at 16:38

## 2018-12-18 RX ADMIN — PHENOBARBITAL 40 MG: 20 LIQUID ORAL at 21:31

## 2018-12-18 RX ADMIN — VALPROIC ACID 250 MG: 500 SOLUTION ORAL at 16:10

## 2018-12-18 RX ADMIN — METRONIDAZOLE 500 MG: 500 INJECTION, SOLUTION INTRAVENOUS at 22:21

## 2018-12-18 RX ADMIN — VANCOMYCIN HYDROCHLORIDE 1500 MG: 500 INJECTION, POWDER, LYOPHILIZED, FOR SOLUTION INTRAVENOUS at 18:44

## 2018-12-18 RX ADMIN — METRONIDAZOLE 500 MG: 500 INJECTION, SOLUTION INTRAVENOUS at 14:00

## 2018-12-18 RX ADMIN — AZTREONAM 1000 MG: 1 INJECTION, POWDER, LYOPHILIZED, FOR SOLUTION INTRAMUSCULAR; INTRAVENOUS at 13:30

## 2018-12-18 RX ADMIN — LEVALBUTEROL 1.25 MG: 1.25 SOLUTION, CONCENTRATE RESPIRATORY (INHALATION) at 21:00

## 2018-12-18 RX ADMIN — PROPOFOL 10 MCG/KG/MIN: 10 INJECTION, EMULSION INTRAVENOUS at 16:53

## 2018-12-18 RX ADMIN — LEVOCARNITINE 750 MG: 1 SOLUTION ORAL at 10:00

## 2018-12-18 RX ADMIN — MINERAL OIL AND WHITE PETROLATUM: 150; 830 OINTMENT OPHTHALMIC at 18:43

## 2018-12-18 RX ADMIN — VANCOMYCIN HYDROCHLORIDE 1750 MG: 750 INJECTION, POWDER, LYOPHILIZED, FOR SOLUTION INTRAVENOUS at 05:10

## 2018-12-18 RX ADMIN — TOPIRAMATE 200 MG: 100 TABLET, FILM COATED ORAL at 10:00

## 2018-12-18 RX ADMIN — METRONIDAZOLE 500 MG: 500 INJECTION, SOLUTION INTRAVENOUS at 05:21

## 2018-12-18 RX ADMIN — RUFINAMIDE 1200 MG: 200 TABLET, FILM COATED ORAL at 10:00

## 2018-12-18 RX ADMIN — AZTREONAM 1000 MG: 1 INJECTION, POWDER, LYOPHILIZED, FOR SOLUTION INTRAMUSCULAR; INTRAVENOUS at 05:20

## 2018-12-18 RX ADMIN — PERAMPANEL 8 MG: 4 TABLET ORAL at 21:31

## 2018-12-18 RX ADMIN — POTASSIUM PHOSPHATE, MONOBASIC AND POTASSIUM PHOSPHATE, DIBASIC 12 MMOL: 224; 236 INJECTION, SOLUTION INTRAVENOUS at 09:56

## 2018-12-18 RX ADMIN — VALPROIC ACID 250 MG: 500 SOLUTION ORAL at 21:30

## 2018-12-18 RX ADMIN — ISODIUM CHLORIDE 3 ML: 0.03 SOLUTION RESPIRATORY (INHALATION) at 07:40

## 2018-12-18 RX ADMIN — CHLORHEXIDINE GLUCONATE 0.12% ORAL RINSE 15 ML: 1.2 LIQUID ORAL at 09:55

## 2018-12-18 RX ADMIN — LEVALBUTEROL 1.25 MG: 1.25 SOLUTION, CONCENTRATE RESPIRATORY (INHALATION) at 13:30

## 2018-12-18 RX ADMIN — LEVETIRACETAM 1000 MG: 100 SOLUTION ORAL at 10:00

## 2018-12-18 RX ADMIN — LEVALBUTEROL 1.25 MG: 1.25 SOLUTION, CONCENTRATE RESPIRATORY (INHALATION) at 07:40

## 2018-12-18 RX ADMIN — VALPROIC ACID 250 MG: 500 SOLUTION ORAL at 05:21

## 2018-12-18 RX ADMIN — MINERAL OIL AND WHITE PETROLATUM: 150; 830 OINTMENT OPHTHALMIC at 21:30

## 2018-12-18 RX ADMIN — ISODIUM CHLORIDE 3 ML: 0.03 SOLUTION RESPIRATORY (INHALATION) at 21:00

## 2018-12-18 RX ADMIN — LORAZEPAM 0.5 MG: 2 INJECTION INTRAMUSCULAR; INTRAVENOUS at 21:18

## 2018-12-18 RX ADMIN — SODIUM CHLORIDE 50 ML/HR: 0.45 INJECTION, SOLUTION INTRAVENOUS at 05:10

## 2018-12-18 RX ADMIN — RUFINAMIDE 1200 MG: 200 TABLET, FILM COATED ORAL at 18:43

## 2018-12-18 RX ADMIN — MINERAL OIL AND WHITE PETROLATUM: 150; 830 OINTMENT OPHTHALMIC at 12:39

## 2018-12-18 RX ADMIN — HYDROCORTISONE SODIUM SUCCINATE 50 MG: 100 INJECTION, POWDER, FOR SOLUTION INTRAMUSCULAR; INTRAVENOUS at 04:52

## 2018-12-18 RX ADMIN — LEVETIRACETAM 1000 MG: 100 SOLUTION ORAL at 21:30

## 2018-12-18 RX ADMIN — ENOXAPARIN SODIUM 40 MG: 40 INJECTION SUBCUTANEOUS at 15:32

## 2018-12-18 RX ADMIN — PROPOFOL 20 MCG/KG/MIN: 10 INJECTION, EMULSION INTRAVENOUS at 18:59

## 2018-12-18 NOTE — ASSESSMENT & PLAN NOTE
· Versus possible healthcare-associated pneumonia/possible gram-negative pneumonia  · Continue IV aztreonam, IV vancomycin, and IV metronidazole  · Follow culture results  · Follow the procalcitonin level

## 2018-12-18 NOTE — UTILIZATION REVIEW
Continued Stay Review  Date: 12/18/18  Vital Signs: BP 91/64 (BP Location: Left arm)   Pulse 87   Temp (!) 97 4 °F (36 3 °C) (Tympanic)   Resp 17   Ht 5' (1 524 m)   Wt 60 kg (132 lb 4 4 oz)   SpO2 96%   BMI 25 83 kg/m²     Medications:   Scheduled Meds:   Current Facility-Administered Medications:  albuterol 2 5 mg Nebulization Q4H PRN Aniket Hurley MD    artificial tear  Both Eyes 4x Daily KATHY Sheikh    aztreonam 1,000 mg Intravenous Iris Mylar, DO Last Rate: 1,000 mg (12/18/18 0520)   chlorhexidine 15 mL [facility] Catalina Salvador DO    dexmedetomidine (PRECEDEX) 200 mcg in 50 ml sodium chloride 0 9% 0 1-1 4 mcg/kg/hr Intravenous Titrated Smita Pagan PA-C Last Rate: 0 2 mcg/kg/hr (12/18/18 0855)   dextrose 25 mL Intravenous PRN [facility], DO    enoxaparin 40 mg Subcutaneous Q24H [address] Tristan Harvey DO    insulin lispro 1-5 Units Subcutaneous Q6H [address] Topher Gaston DO    levalbuterol 1 25 mg Nebulization TID Aniket Hurley MD    levETIRAcetam 1,000 mg Per G Tube Q12H [address] Catalina Salvador DO    levOCARNitine 750 mg Per G Tube TID Catalina Salvador DO    metroNIDAZOLE 500 mg Intravenous [facility] Catalina Salvador DO Last Rate: 500 mg (12/18/18 0521)   norepinephrine 1-30 mcg/min Intravenous Titrated [facility], DO Last Rate: Stopped (12/17/18 0319)   ondansetron 4 mg Intravenous Q4H PRN Catalina Salvador DO    Perampanel 8 mg Per NG Tube HS Catalina Salvador DO    PHENobarbital 40 mg Oral HS Catalina Salvador DO    potassium phosphate 12 mmol Intravenous Once [facility], DO Last Rate: 12 mmol (12/18/18 0956)   rufinamide 1,200 mg Per G Tube BID Aniket Hurley MD    sodium chloride 3 mL Nebulization TID Aniket Hurley MD    topiramate 200 mg Oral Q12H [address] Catalina Salvador DO    valproic acid 250 mg Per G Tube Q8H Catalina Salvador DO    vancomycin 1,750 mg Intravenous Q12H Aniket Hurley MD Last Rate: 1,750 mg (12/18/18 0510)     Facility-Administered Medications Ordered in Other Encounters:  propofol  Intravenous PRN Jonathan Bustillo CRNA   vecuronium   PRN Jonathan Bustillo CRNA     Continuous Infusions:   dexmedetomidine (PRECEDEX) 200 mcg in 50 ml sodium chloride 0 9% 0 1-1 4 mcg/kg/hr Last Rate: 0 2 mcg/kg/hr (12/18/18 8577)   norepinephrine 1-30 mcg/min Last Rate: Stopped (12/17/18 0319)     PRN Meds:   albuterol    dextrose    ondansetron    Abnormal Labs/Diagnostic Results:   WBC 14 51 HGB 9 3  K 2 7  CR 0 39 GLUC 181 CA 7 3 AST 76  ALK PHOS 149 TPROT 4 3 ALB 1 5 PHOS 2 3 PT 17 0 INR 1 46  CXR=1  Patchy bilateral airspace opacities suspicious for pulmonary edema versus multifocal infection  2   Bilateral pleural effusions  3   Endotracheal tube tip is approximately 1 5 cm above the gene  Retraction approximately 2 cm could be considered if clinically warranted    Age/Sex: 41 y o  female     Assessment/Plan:   Severe sepsis with septic shock (CODE) ([facility])   Assessment & Plan     · Severe sepsis (present on admission) secondary to possible aspiration pneumonia versus healthcare-associated pneumonia/possible gram-negative pneumonia  · The patient has been successfully weaned off norepinephrine  · Continue IV aztreonam, IV vancomycin, and IV metronidazole  · Follow culture results  · Follow the procalcitonin level            Seizure disorder [facility])   Assessment & Plan     · Anti-epileptic drug regimen was reviewed in detail between Dr Liana Cavazos and the patient's primary epileptologist on 12/17/2018  · The patient's phenobarbital was decreased to hopefully improve her mentation in anticipation of possible weaning of the ventilator  · Per Neurology, the Perampanel tablet 8 mg could be increased to 10 to 12 mg if needed for any evidence of seizure activity  · Check an EEG  · [address] [facility] Neurology today, 12/18/2018      Aspiration pneumonia (HCC)   Assessment & Plan     · Versus possible healthcare-associated pneumonia/possible gram-negative pneumonia  · Continue IV aztreonam, IV vancomycin, and IV metronidazole  · Follow culture results  · Follow the procalcitonin level   Discharge Plan: TBD

## 2018-12-18 NOTE — PROGRESS NOTES
Vancomycin IV Pharmacy-to-Dose Consultation    Reinaldo Oshea is a 41 y o  female who is currently receiving Vancomycin IV with management by the Pharmacy Consult service  Assessment/Plan:  The patient was reviewed  Renal function is stable and no signs or symptoms of nephrotoxicity and/or infusion reactions were documented in the chart  Based on todays assessment, continue current vancomycin (day # 5) dosing of 1750mg Q12h, with a plan for trough to be drawn at 1630 on 12/18/18  We will continue to follow the patients culture results and clinical progress daily      Michael Junior, Pharmacist

## 2018-12-18 NOTE — ASSESSMENT & PLAN NOTE
· Severe sepsis (present on admission) secondary to possible aspiration pneumonia versus healthcare-associated pneumonia/possible gram-negative pneumonia  · The patient has been successfully weaned off norepinephrine  · Continue IV aztreonam, IV vancomycin, and IV metronidazole  · Follow culture results  · Follow the procalcitonin level

## 2018-12-18 NOTE — ASSESSMENT & PLAN NOTE
· Anti-epileptic drug regimen was reviewed in detail between Dr Arielle Jason and the patient's primary epileptologist on 12/17/2018  · The patient's phenobarbital was decreased to hopefully improve her mentation in anticipation of possible weaning of the ventilator  · Per Neurology, the Perampanel tablet 8 mg could be increased to 10 to 12 mg if needed for any evidence of seizure activity  · Check an EEG  · [address] [facility] today, 12/18/2018

## 2018-12-18 NOTE — ASSESSMENT & PLAN NOTE
· Continue the current ventilator settings  · Possible subclinical seizures  · S/P bronchoscopy on 12/17/2018  · Recheck a portable chest x-ray today, 12/18/2018  · I appreciate Critical Care Medicine's input

## 2018-12-18 NOTE — ASSESSMENT & PLAN NOTE
· S/P thoracentesis by Interventional Radiology on 12/17/2018  · Follow the pleural fluid analysis results and pleural fluid culture results  · Recheck a portable chest x-ray today, 12/18/2018

## 2018-12-18 NOTE — ASSESSMENT & PLAN NOTE
· Likely medication effect of her seizure medication regimen  · The patient currently has a normal ammonia level  · Discontinue lactulose with the diarrhea

## 2018-12-18 NOTE — ASSESSMENT & PLAN NOTE
· Increase free water flushes to 200 mL every 6 hours via PEG tube  · Follow the sodium level  · Discontinue IV fluids

## 2018-12-18 NOTE — ASSESSMENT & PLAN NOTE
· Anti-epileptic drug regimen was reviewed in detail between Dr Palomo Hadley and the patient's primary epileptologist on 12/17/2018  · The patient's phenobarbital was decreased to hopefully improve her mentation in anticipation of possible weaning of the ventilator  · Per Neurology, the Perampanel tablet 8 mg could be increased to 10 to 12 mg if needed for any evidence of seizure activity  · Check an EEG  · [facility] today, 12/18/2018

## 2018-12-18 NOTE — RESPIRATORY THERAPY NOTE
RT Ventilator Management Note  Ricky Age 41 y o  female MRN: [id number]  Unit/Bed#:  Encounter: [id number]      Daily Screen       12/18/2018 0741 12/18/2018 0828          Patient safety screen outcome[de-identified] Passed -      Spont breathing trial outcome[de-identified] - Failed      Spont breathing trial reason failed: - -      Previous settings resumed: - Yes              Physical Exam:    Pt returned to [facility] settings due to pt believed to be having seizures per Dr Gabriele Farley  Plan for nuero consult  No plan for wean until pt seen by neurology  Dr Gabriele Farley to discuss with Pulmonary, per Dr Gabriele Farley  Will continue to monitor  Pt's FiO2 increased to 30% due to SpO2 of 88% on 24%  RN aware

## 2018-12-18 NOTE — ASSESSMENT & PLAN NOTE
· Likely medication effect versus shock liver with the patient having severe sepsis with septic shock  · Follow the LFT's (liver function tests)  · Avoid all hepatotoxic agents

## 2018-12-18 NOTE — RESPIRATORY THERAPY NOTE
RT Ventilator Management Note  Jean-Claude Lao 41 y o  female MRN: [id number]  Unit/Bed#:  Encounter: [id number]      Daily Screen       12/17/2018 0728 12/18/2018 0741          Patient safety screen outcome[de-identified] Failed Passed      Not Ready for Weaning due to[de-identified] Hemodynamically unstable; Underline problem not resolved -              Physical Exam:       Pt was received on AC of [id number]%, pt was changed to PS of 12 with 5 of peep and 24% O2  Pt currently doing a RR between 16-26 with tidal volumes around 320-400  No apparent increased WOB noted  Will continue to monitor pt

## 2018-12-19 ENCOUNTER — APPOINTMENT (INPATIENT)
Dept: RADIOLOGY | Facility: HOSPITAL | Age: 38
DRG: 871 | End: 2018-12-19
Payer: MEDICARE

## 2018-12-19 ENCOUNTER — HOSPITAL ENCOUNTER (INPATIENT)
Facility: HOSPITAL | Age: 38
LOS: 5 days | Discharge: NURSING, REHAB OR LONG-TERM CARE | DRG: 100 | End: 2018-12-24
Attending: EMERGENCY MEDICINE | Admitting: STUDENT IN AN ORGANIZED HEALTH CARE EDUCATION/TRAINING PROGRAM
Payer: MEDICARE

## 2018-12-19 ENCOUNTER — APPOINTMENT (INPATIENT)
Dept: NEUROLOGY | Facility: HOSPITAL | Age: 38
DRG: 871 | End: 2018-12-19
Attending: INTERNAL MEDICINE
Payer: MEDICARE

## 2018-12-19 ENCOUNTER — APPOINTMENT (INPATIENT)
Dept: RADIOLOGY | Facility: HOSPITAL | Age: 38
DRG: 100 | End: 2018-12-19
Payer: MEDICARE

## 2018-12-19 VITALS
TEMPERATURE: 98 F | BODY MASS INDEX: 26.4 KG/M2 | DIASTOLIC BLOOD PRESSURE: 61 MMHG | WEIGHT: 134.48 LBS | HEIGHT: 60 IN | SYSTOLIC BLOOD PRESSURE: 112 MMHG | HEART RATE: 121 BPM | OXYGEN SATURATION: 95 % | RESPIRATION RATE: 14 BRPM

## 2018-12-19 DIAGNOSIS — G40.812 LENNOX-GASTAUT SYNDROME WITH TONIC SEIZURES (HCC): ICD-10-CM

## 2018-12-19 DIAGNOSIS — G40.901 STATUS EPILEPTICUS (HCC): Primary | ICD-10-CM

## 2018-12-19 LAB
ALBUMIN SERPL BCP-MCNC: 1.4 G/DL (ref 3.5–5)
ALP SERPL-CCNC: 143 U/L (ref 46–116)
ALT SERPL W P-5'-P-CCNC: 74 U/L (ref 12–78)
AMMONIA PLAS-SCNC: 22 UMOL/L (ref 11–35)
ANION GAP SERPL CALCULATED.3IONS-SCNC: 8 MMOL/L (ref 4–13)
ANISOCYTOSIS BLD QL SMEAR: PRESENT
AST SERPL W P-5'-P-CCNC: 70 U/L (ref 5–45)
ATRIAL RATE: 105 BPM
ATRIAL RATE: 113 BPM
BACTERIA BLD CULT: NORMAL
BACTERIA BLD CULT: NORMAL
BACTERIA BRONCH AEROBE CULT: NO GROWTH
BASOPHILS # BLD MANUAL: 0 THOUSAND/UL (ref 0–0.1)
BASOPHILS NFR MAR MANUAL: 0 % (ref 0–1)
BILIRUB SERPL-MCNC: 0.4 MG/DL (ref 0.2–1)
BUN SERPL-MCNC: 5 MG/DL (ref 5–25)
CA-I BLD-SCNC: 1.13 MMOL/L (ref 1.12–1.32)
CALCIUM SERPL-MCNC: 7.3 MG/DL (ref 8.3–10.1)
CHLORIDE SERPL-SCNC: 108 MMOL/L (ref 100–108)
CK SERPL-CCNC: 25 U/L (ref 26–192)
CO2 SERPL-SCNC: 27 MMOL/L (ref 21–32)
CREAT SERPL-MCNC: 0.31 MG/DL (ref 0.6–1.3)
EOSINOPHIL # BLD MANUAL: 0 THOUSAND/UL (ref 0–0.4)
EOSINOPHIL NFR BLD MANUAL: 0 % (ref 0–6)
ERYTHROCYTE [DISTWIDTH] IN BLOOD BY AUTOMATED COUNT: 15.7 % (ref 11.6–15.1)
FOLATE SERPL-MCNC: 18.4 NG/ML (ref 3.1–17.5)
GFR SERPL CREATININE-BSD FRML MDRD: 145 ML/MIN/1.73SQ M
GLUCOSE SERPL-MCNC: 109 MG/DL (ref 65–140)
GLUCOSE SERPL-MCNC: 118 MG/DL (ref 65–140)
GLUCOSE SERPL-MCNC: 120 MG/DL (ref 65–140)
GLUCOSE SERPL-MCNC: 121 MG/DL (ref 65–140)
GLUCOSE SERPL-MCNC: 143 MG/DL (ref 65–140)
GRAM STN SPEC: NORMAL
GRAM STN SPEC: NORMAL
HAV IGM SER QL: NORMAL
HBV CORE IGM SER QL: NORMAL
HBV SURFACE AG SER QL: NORMAL
HCT VFR BLD AUTO: 27.9 % (ref 34.8–46.1)
HCV AB SER QL: NORMAL
HGB BLD-MCNC: 9.1 G/DL (ref 11.5–15.4)
HYPERCHROMIA BLD QL SMEAR: PRESENT
LACTATE SERPL-SCNC: 2.4 MMOL/L (ref 0.5–2)
LACTATE SERPL-SCNC: 3.1 MMOL/L (ref 0.5–2)
LEVETIRACETAM SERPL-MCNC: 29.3 UG/ML (ref 10–40)
LYMPHOCYTES # BLD AUTO: 37 % (ref 14–44)
LYMPHOCYTES # BLD AUTO: 6.8 THOUSAND/UL (ref 0.6–4.47)
MAGNESIUM SERPL-MCNC: 1.9 MG/DL (ref 1.6–2.6)
MCH RBC QN AUTO: 33.8 PG (ref 26.8–34.3)
MCHC RBC AUTO-ENTMCNC: 32.6 G/DL (ref 31.4–37.4)
MCV RBC AUTO: 104 FL (ref 82–98)
METAMYELOCYTES NFR BLD MANUAL: 1 % (ref 0–1)
MONOCYTES # BLD AUTO: 0.55 THOUSAND/UL (ref 0–1.22)
MONOCYTES NFR BLD: 3 % (ref 4–12)
MRSA NOSE QL CULT: ABNORMAL
MRSA NOSE QL CULT: ABNORMAL
MYELOCYTES NFR BLD MANUAL: 3 % (ref 0–1)
NEUTROPHILS # BLD MANUAL: 10.29 THOUSAND/UL (ref 1.85–7.62)
NEUTS BAND NFR BLD MANUAL: 2 % (ref 0–8)
NEUTS SEG NFR BLD AUTO: 54 % (ref 43–75)
NRBC BLD AUTO-RTO: 0 /100 WBCS
NRBC BLD AUTO-RTO: 3 /100 WBC (ref 0–2)
P AXIS: 79 DEGREES
P AXIS: 83 DEGREES
PHOSPHATE SERPL-MCNC: 3.1 MG/DL (ref 2.7–4.5)
PLATELET # BLD AUTO: 176 THOUSANDS/UL (ref 149–390)
PLATELET # BLD AUTO: 218 THOUSANDS/UL (ref 149–390)
PLATELET BLD QL SMEAR: ADEQUATE
PMV BLD AUTO: 10.9 FL (ref 8.9–12.7)
PMV BLD AUTO: 11 FL (ref 8.9–12.7)
POIKILOCYTOSIS BLD QL SMEAR: PRESENT
POTASSIUM SERPL-SCNC: 3.2 MMOL/L (ref 3.5–5.3)
PR INTERVAL: 156 MS
PR INTERVAL: 188 MS
PROCALCITONIN SERPL-MCNC: 1.64 NG/ML
PROLACTIN SERPL-MCNC: 9.6 NG/ML
PROT SERPL-MCNC: 4.2 G/DL (ref 6.4–8.2)
QRS AXIS: 81 DEGREES
QRS AXIS: 82 DEGREES
QRSD INTERVAL: 64 MS
QRSD INTERVAL: 68 MS
QT INTERVAL: 264 MS
QT INTERVAL: 270 MS
QTC INTERVAL: 356 MS
QTC INTERVAL: 362 MS
RBC # BLD AUTO: 2.69 MILLION/UL (ref 3.81–5.12)
SODIUM SERPL-SCNC: 143 MMOL/L (ref 136–145)
STOMATOCYTES BLD QL SMEAR: PRESENT
T WAVE AXIS: -56 DEGREES
T WAVE AXIS: 63 DEGREES
TOTAL CELLS COUNTED SPEC: 100
VALPROATE FREE SERPL-MCNC: 9.3 UG/ML (ref 6–22)
VENTRICULAR RATE: 105 BPM
VENTRICULAR RATE: 113 BPM
VIT B12 SERPL-MCNC: 2932 PG/ML (ref 100–900)
WBC # BLD AUTO: 18.37 THOUSAND/UL (ref 4.31–10.16)

## 2018-12-19 PROCEDURE — 94640 AIRWAY INHALATION TREATMENT: CPT

## 2018-12-19 PROCEDURE — 95822 EEG COMA OR SLEEP ONLY: CPT | Performed by: PSYCHIATRY & NEUROLOGY

## 2018-12-19 PROCEDURE — 82330 ASSAY OF CALCIUM: CPT | Performed by: INTERNAL MEDICINE

## 2018-12-19 PROCEDURE — 94002 VENT MGMT INPAT INIT DAY: CPT

## 2018-12-19 PROCEDURE — 94669 MECHANICAL CHEST WALL OSCILL: CPT

## 2018-12-19 PROCEDURE — 82550 ASSAY OF CK (CPK): CPT | Performed by: INTERNAL MEDICINE

## 2018-12-19 PROCEDURE — 94760 N-INVAS EAR/PLS OXIMETRY 1: CPT

## 2018-12-19 PROCEDURE — 82607 VITAMIN B-12: CPT | Performed by: INTERNAL MEDICINE

## 2018-12-19 PROCEDURE — 99239 HOSP IP/OBS DSCHRG MGMT >30: CPT | Performed by: INTERNAL MEDICINE

## 2018-12-19 PROCEDURE — 85007 BL SMEAR W/DIFF WBC COUNT: CPT | Performed by: INTERNAL MEDICINE

## 2018-12-19 PROCEDURE — 99233 SBSQ HOSP IP/OBS HIGH 50: CPT | Performed by: INTERNAL MEDICINE

## 2018-12-19 PROCEDURE — 83605 ASSAY OF LACTIC ACID: CPT | Performed by: INTERNAL MEDICINE

## 2018-12-19 PROCEDURE — 82746 ASSAY OF FOLIC ACID SERUM: CPT | Performed by: INTERNAL MEDICINE

## 2018-12-19 PROCEDURE — 80053 COMPREHEN METABOLIC PANEL: CPT | Performed by: INTERNAL MEDICINE

## 2018-12-19 PROCEDURE — 94664 DEMO&/EVAL PT USE INHALER: CPT

## 2018-12-19 PROCEDURE — 93010 ELECTROCARDIOGRAM REPORT: CPT | Performed by: INTERNAL MEDICINE

## 2018-12-19 PROCEDURE — 85027 COMPLETE CBC AUTOMATED: CPT | Performed by: INTERNAL MEDICINE

## 2018-12-19 PROCEDURE — 95816 EEG AWAKE AND DROWSY: CPT

## 2018-12-19 PROCEDURE — 83735 ASSAY OF MAGNESIUM: CPT | Performed by: INTERNAL MEDICINE

## 2018-12-19 PROCEDURE — 71045 X-RAY EXAM CHEST 1 VIEW: CPT

## 2018-12-19 PROCEDURE — 82948 REAGENT STRIP/BLOOD GLUCOSE: CPT

## 2018-12-19 PROCEDURE — 84146 ASSAY OF PROLACTIN: CPT | Performed by: EMERGENCY MEDICINE

## 2018-12-19 PROCEDURE — 83605 ASSAY OF LACTIC ACID: CPT | Performed by: EMERGENCY MEDICINE

## 2018-12-19 PROCEDURE — 84146 ASSAY OF PROLACTIN: CPT | Performed by: INTERNAL MEDICINE

## 2018-12-19 PROCEDURE — 99291 CRITICAL CARE FIRST HOUR: CPT | Performed by: STUDENT IN AN ORGANIZED HEALTH CARE EDUCATION/TRAINING PROGRAM

## 2018-12-19 PROCEDURE — 84100 ASSAY OF PHOSPHORUS: CPT | Performed by: INTERNAL MEDICINE

## 2018-12-19 PROCEDURE — 84145 PROCALCITONIN (PCT): CPT | Performed by: INTERNAL MEDICINE

## 2018-12-19 PROCEDURE — 85049 AUTOMATED PLATELET COUNT: CPT | Performed by: EMERGENCY MEDICINE

## 2018-12-19 PROCEDURE — 94003 VENT MGMT INPAT SUBQ DAY: CPT

## 2018-12-19 PROCEDURE — 5A1945Z RESPIRATORY VENTILATION, 24-96 CONSECUTIVE HOURS: ICD-10-PCS | Performed by: INTERNAL MEDICINE

## 2018-12-19 PROCEDURE — 87040 BLOOD CULTURE FOR BACTERIA: CPT | Performed by: INTERNAL MEDICINE

## 2018-12-19 PROCEDURE — 82140 ASSAY OF AMMONIA: CPT | Performed by: INTERNAL MEDICINE

## 2018-12-19 RX ORDER — LEVETIRACETAM 100 MG/ML
1000 SOLUTION ORAL EVERY 12 HOURS SCHEDULED
Status: CANCELLED | OUTPATIENT
Start: 2018-12-19

## 2018-12-19 RX ORDER — LORAZEPAM 2 MG/ML
0.5 INJECTION INTRAMUSCULAR EVERY 2 HOUR PRN
Status: CANCELLED | OUTPATIENT
Start: 2018-12-19

## 2018-12-19 RX ORDER — LEVALBUTEROL 1.25 MG/.5ML
1.25 SOLUTION, CONCENTRATE RESPIRATORY (INHALATION)
Status: CANCELLED | OUTPATIENT
Start: 2018-12-19

## 2018-12-19 RX ORDER — TOPIRAMATE 100 MG/1
200 TABLET, FILM COATED ORAL 2 TIMES DAILY
Status: DISCONTINUED | OUTPATIENT
Start: 2018-12-19 | End: 2018-12-24 | Stop reason: HOSPADM

## 2018-12-19 RX ORDER — PHENOBARBITAL 20 MG/5ML
40 ELIXIR ORAL
Status: CANCELLED | OUTPATIENT
Start: 2018-12-19

## 2018-12-19 RX ORDER — ALBUTEROL SULFATE 2.5 MG/3ML
2.5 SOLUTION RESPIRATORY (INHALATION) EVERY 4 HOURS PRN
Status: CANCELLED | OUTPATIENT
Start: 2018-12-19

## 2018-12-19 RX ORDER — MINERAL OIL AND PETROLATUM 150; 830 MG/G; MG/G
OINTMENT OPHTHALMIC 4 TIMES DAILY
Status: CANCELLED | OUTPATIENT
Start: 2018-12-19

## 2018-12-19 RX ORDER — CHLORHEXIDINE GLUCONATE 0.12 MG/ML
15 RINSE ORAL EVERY 12 HOURS SCHEDULED
Status: DISCONTINUED | OUTPATIENT
Start: 2018-12-19 | End: 2018-12-21

## 2018-12-19 RX ORDER — TOPIRAMATE 100 MG/1
200 TABLET, FILM COATED ORAL EVERY 12 HOURS SCHEDULED
Status: CANCELLED | OUTPATIENT
Start: 2018-12-19

## 2018-12-19 RX ORDER — ONDANSETRON 2 MG/ML
4 INJECTION INTRAMUSCULAR; INTRAVENOUS EVERY 4 HOURS PRN
Status: CANCELLED | OUTPATIENT
Start: 2018-12-19

## 2018-12-19 RX ORDER — PHENOBARBITAL 20 MG/5ML
40 ELIXIR ORAL
Status: DISCONTINUED | OUTPATIENT
Start: 2018-12-19 | End: 2018-12-24 | Stop reason: HOSPADM

## 2018-12-19 RX ORDER — POTASSIUM CHLORIDE 14.9 MG/ML
20 INJECTION INTRAVENOUS
Status: COMPLETED | OUTPATIENT
Start: 2018-12-19 | End: 2018-12-19

## 2018-12-19 RX ORDER — DEXTROSE MONOHYDRATE 25 G/50ML
25 INJECTION, SOLUTION INTRAVENOUS AS NEEDED
Status: CANCELLED | OUTPATIENT
Start: 2018-12-19

## 2018-12-19 RX ORDER — CHLORHEXIDINE GLUCONATE 0.12 MG/ML
15 RINSE ORAL EVERY 12 HOURS SCHEDULED
Status: CANCELLED | OUTPATIENT
Start: 2018-12-19

## 2018-12-19 RX ORDER — RUFINAMIDE 200 MG/1
1200 TABLET, FILM COATED ORAL 2 TIMES DAILY WITH MEALS
Status: DISCONTINUED | OUTPATIENT
Start: 2018-12-20 | End: 2018-12-24 | Stop reason: HOSPADM

## 2018-12-19 RX ORDER — VALPROIC ACID 250 MG/5ML
250 SOLUTION ORAL EVERY 8 HOURS SCHEDULED
Status: DISCONTINUED | OUTPATIENT
Start: 2018-12-19 | End: 2018-12-24 | Stop reason: HOSPADM

## 2018-12-19 RX ORDER — ONDANSETRON 2 MG/ML
4 INJECTION INTRAMUSCULAR; INTRAVENOUS EVERY 4 HOURS PRN
Status: DISCONTINUED | OUTPATIENT
Start: 2018-12-19 | End: 2018-12-24 | Stop reason: HOSPADM

## 2018-12-19 RX ORDER — LEVOCARNITINE 1 G/10ML
750 SOLUTION ORAL 3 TIMES DAILY
Status: CANCELLED | OUTPATIENT
Start: 2018-12-19

## 2018-12-19 RX ORDER — VALPROIC ACID 250 MG/5ML
250 SOLUTION ORAL EVERY 8 HOURS
Status: CANCELLED | OUTPATIENT
Start: 2018-12-19

## 2018-12-19 RX ORDER — LEVOCARNITINE 1 G/10ML
750 SOLUTION ORAL 3 TIMES DAILY
Status: DISCONTINUED | OUTPATIENT
Start: 2018-12-20 | End: 2018-12-24 | Stop reason: HOSPADM

## 2018-12-19 RX ORDER — HEPARIN SODIUM 5000 [USP'U]/ML
5000 INJECTION, SOLUTION INTRAVENOUS; SUBCUTANEOUS EVERY 8 HOURS SCHEDULED
Status: DISCONTINUED | OUTPATIENT
Start: 2018-12-19 | End: 2018-12-21

## 2018-12-19 RX ORDER — SODIUM CHLORIDE FOR INHALATION 0.9 %
3 VIAL, NEBULIZER (ML) INHALATION
Status: CANCELLED | OUTPATIENT
Start: 2018-12-19

## 2018-12-19 RX ORDER — RUFINAMIDE 200 MG/1
1200 TABLET, FILM COATED ORAL 2 TIMES DAILY
Status: CANCELLED | OUTPATIENT
Start: 2018-12-19

## 2018-12-19 RX ADMIN — CHLORHEXIDINE GLUCONATE 0.12% ORAL RINSE 15 ML: 1.2 LIQUID ORAL at 08:07

## 2018-12-19 RX ADMIN — METRONIDAZOLE 500 MG: 500 INJECTION, SOLUTION INTRAVENOUS at 22:58

## 2018-12-19 RX ADMIN — LEVOCARNITINE 750 MG: 1 SOLUTION ORAL at 19:28

## 2018-12-19 RX ADMIN — AZTREONAM 1000 MG: 1 INJECTION, POWDER, LYOPHILIZED, FOR SOLUTION INTRAMUSCULAR; INTRAVENOUS at 05:02

## 2018-12-19 RX ADMIN — MINERAL OIL AND WHITE PETROLATUM: 150; 830 OINTMENT OPHTHALMIC at 08:10

## 2018-12-19 RX ADMIN — TOPIRAMATE 200 MG: 100 TABLET, FILM COATED ORAL at 23:11

## 2018-12-19 RX ADMIN — METRONIDAZOLE 500 MG: 500 INJECTION, SOLUTION INTRAVENOUS at 14:28

## 2018-12-19 RX ADMIN — METRONIDAZOLE 500 MG: 500 INJECTION, SOLUTION INTRAVENOUS at 06:18

## 2018-12-19 RX ADMIN — LEVETIRACETAM 1000 MG: 100 INJECTION, SOLUTION INTRAVENOUS at 23:35

## 2018-12-19 RX ADMIN — RUFINAMIDE 1200 MG: 200 TABLET, FILM COATED ORAL at 08:07

## 2018-12-19 RX ADMIN — PROPOFOL 25 MCG/KG/MIN: 10 INJECTION, EMULSION INTRAVENOUS at 03:21

## 2018-12-19 RX ADMIN — LEVETIRACETAM 1000 MG: 100 SOLUTION ORAL at 08:09

## 2018-12-19 RX ADMIN — LORAZEPAM 0.5 MG: 2 INJECTION INTRAMUSCULAR; INTRAVENOUS at 01:08

## 2018-12-19 RX ADMIN — POTASSIUM CHLORIDE 20 MEQ: 200 INJECTION, SOLUTION INTRAVENOUS at 13:00

## 2018-12-19 RX ADMIN — LEVALBUTEROL 1.25 MG: 1.25 SOLUTION, CONCENTRATE RESPIRATORY (INHALATION) at 13:54

## 2018-12-19 RX ADMIN — VALPROIC ACID 250 MG: 500 SOLUTION ORAL at 23:34

## 2018-12-19 RX ADMIN — ISODIUM CHLORIDE 3 ML: 0.03 SOLUTION RESPIRATORY (INHALATION) at 07:39

## 2018-12-19 RX ADMIN — VANCOMYCIN HYDROCHLORIDE 1500 MG: 500 INJECTION, POWDER, LYOPHILIZED, FOR SOLUTION INTRAVENOUS at 19:28

## 2018-12-19 RX ADMIN — VANCOMYCIN HYDROCHLORIDE 1500 MG: 500 INJECTION, POWDER, LYOPHILIZED, FOR SOLUTION INTRAVENOUS at 08:02

## 2018-12-19 RX ADMIN — RUFINAMIDE 1200 MG: 200 TABLET, FILM COATED ORAL at 19:28

## 2018-12-19 RX ADMIN — ENOXAPARIN SODIUM 40 MG: 40 INJECTION SUBCUTANEOUS at 08:09

## 2018-12-19 RX ADMIN — LORAZEPAM 0.5 MG: 2 INJECTION INTRAMUSCULAR; INTRAVENOUS at 05:02

## 2018-12-19 RX ADMIN — TOPIRAMATE 200 MG: 100 TABLET, FILM COATED ORAL at 08:08

## 2018-12-19 RX ADMIN — THIAMINE HYDROCHLORIDE 200 MG: 100 INJECTION, SOLUTION INTRAMUSCULAR; INTRAVENOUS at 08:07

## 2018-12-19 RX ADMIN — MINERAL OIL AND WHITE PETROLATUM: 150; 830 OINTMENT OPHTHALMIC at 19:28

## 2018-12-19 RX ADMIN — CHLORHEXIDINE GLUCONATE 0.12% ORAL RINSE 15 ML: 1.2 LIQUID ORAL at 22:39

## 2018-12-19 RX ADMIN — MINERAL OIL AND WHITE PETROLATUM: 150; 830 OINTMENT OPHTHALMIC at 13:16

## 2018-12-19 RX ADMIN — POTASSIUM CHLORIDE 20 MEQ: 200 INJECTION, SOLUTION INTRAVENOUS at 15:00

## 2018-12-19 RX ADMIN — ISODIUM CHLORIDE 3 ML: 0.03 SOLUTION RESPIRATORY (INHALATION) at 20:03

## 2018-12-19 RX ADMIN — VALPROIC ACID 250 MG: 500 SOLUTION ORAL at 13:32

## 2018-12-19 RX ADMIN — AZTREONAM 1000 MG: 1 INJECTION, POWDER, LYOPHILIZED, FOR SOLUTION INTRAMUSCULAR; INTRAVENOUS at 23:45

## 2018-12-19 RX ADMIN — LORAZEPAM 0.5 MG: 2 INJECTION INTRAMUSCULAR; INTRAVENOUS at 14:27

## 2018-12-19 RX ADMIN — MIDAZOLAM 2 MG/HR: 5 INJECTION INTRAMUSCULAR; INTRAVENOUS at 22:46

## 2018-12-19 RX ADMIN — VALPROIC ACID 250 MG: 500 SOLUTION ORAL at 06:15

## 2018-12-19 RX ADMIN — AZTREONAM 1000 MG: 1 INJECTION, POWDER, LYOPHILIZED, FOR SOLUTION INTRAMUSCULAR; INTRAVENOUS at 13:08

## 2018-12-19 RX ADMIN — LEVALBUTEROL 1.25 MG: 1.25 SOLUTION, CONCENTRATE RESPIRATORY (INHALATION) at 07:38

## 2018-12-19 RX ADMIN — MIDAZOLAM 4 MG/HR: 5 INJECTION INTRAMUSCULAR; INTRAVENOUS at 23:46

## 2018-12-19 RX ADMIN — POTASSIUM CHLORIDE 20 MEQ: 200 INJECTION, SOLUTION INTRAVENOUS at 10:00

## 2018-12-19 RX ADMIN — MIDAZOLAM 1 MG/HR: 5 INJECTION INTRAMUSCULAR; INTRAVENOUS at 13:19

## 2018-12-19 RX ADMIN — SODIUM CHLORIDE 1000 ML: 0.9 INJECTION, SOLUTION INTRAVENOUS at 08:23

## 2018-12-19 RX ADMIN — LEVALBUTEROL 1.25 MG: 1.25 SOLUTION, CONCENTRATE RESPIRATORY (INHALATION) at 20:03

## 2018-12-19 RX ADMIN — ALTEPLASE 2 MG: 2.2 INJECTION, POWDER, LYOPHILIZED, FOR SOLUTION INTRAVENOUS at 11:37

## 2018-12-19 RX ADMIN — ISODIUM CHLORIDE 3 ML: 0.03 SOLUTION RESPIRATORY (INHALATION) at 13:55

## 2018-12-19 RX ADMIN — HEPARIN SODIUM 5000 UNITS: 5000 INJECTION INTRAVENOUS; SUBCUTANEOUS at 22:39

## 2018-12-19 RX ADMIN — LEVOCARNITINE 750 MG: 1 SOLUTION ORAL at 08:08

## 2018-12-19 NOTE — DISCHARGE SUMMARY
Discharge- Joanne Vance [facility] COTTAGE 1980, 41 y o  female MRN: [id number]    Unit/Bed#:  Encounter: [id number]    Primary Care Provider: Michael Amanda DO   Date and time admitted to hospital: 12/14/2018  5:44 AM        * Severe sepsis with septic shock (CODE) ([facility] )   Assessment & Plan    · Severe sepsis (present on admission) secondary to possible aspiration pneumonia versus healthcare-associated pneumonia/possible gram-negative pneumonia  · The patient has been successfully weaned off norepinephrine  · Continue IV aztreonam, IV vancomycin, and IV metronidazole  · Follow culture results  · Follow the procalcitonin level         Status epilepticus [facility])   Assessment & Plan    · An EEG was completed today, 12/19/2018, with the following results/impression:  Interpretation: This is an abnormal 39 minutes encephalopathic EEG due to frequent recurrent electrographic seizures with paroxysmal generalized fast activity (PGFA) leading to generalized rhythmic discharges, associated with spontaneous eye opening, upward deviation, which is commonly seen in generalized tonic seizures and Lennox Gastaut Syndrome  In this 39 minute study, comparative to prior continuous EEG monitoring study, is relatively similar in regards to frequency of seizures, but seizure count is variable throughout the day  Excessive beta activity, widespread beta activity, and sleep spindles likely indicate effect from general anesthesia      · The patient was on an IV Propofol drip/infusion at the time of the EEG  · I discussed the case with Dr Carolin Campos (Neurology)  · The patient was placed on an IV Versed (Midazolam) drip/infusion    The patient will be transferred to a higher level of care for 24-hour EEG monitoring and for a daily Neurology evaluation  The patient will be transferred to [address] [place] [address] on the service of Dr Jake Ramos (Critical Care Attending Physician)       Seizure disorder ([facility] ) Assessment & Plan    · Anti-epileptic drug regimen was reviewed in detail between Dr Valdemar Madrigal and the patient's primary epileptologist on 12/17/2018  · The patient's phenobarbital was decreased to hopefully improve her mentation in anticipation of possible weaning of the ventilator  · The Perampanel was increased to 10 mg QHS  · An EEG was completed today, 12/19/2018, with the following results/impression:  Interpretation: This is an abnormal 39 minutes encephalopathic EEG due to frequent recurrent electrographic seizures with paroxysmal generalized fast activity (PGFA) leading to generalized rhythmic discharges, associated with spontaneous eye opening, upward deviation, which is commonly seen in generalized tonic seizures and Lennox Gastaut Syndrome  In this 39 minute study, comparative to prior continuous EEG monitoring study, is relatively similar in regards to frequency of seizures, but seizure count is variable throughout the day  Excessive beta activity, widespread beta activity, and sleep spindles likely indicate effect from general anesthesia      · The patient was on an IV Propofol drip/infusion at the time of the EEG  · I discussed the case with Dr Vernon Brand (Neurology)  · The patient was placed on an IV Versed (Midazolam) drip/infusion    The patient will be transferred to a higher level of care for 24-hour EEG monitoring and for a daily Neurology evaluation  The patient will be transferred to [address] [place] [address] on the service of Dr Eduardo Flores (Critical Care Attending Physician)       Aspiration pneumonia ([facility] )   Assessment & Plan    · Possible aspiration pneumonia versus possible healthcare-associated pneumonia/possible gram-negative pneumonia  · Continue IV aztreonam, IV vancomycin, and IV metronidazole  · Follow culture results  · Follow the procalcitonin level  · Follow the white blood cell count     Acute respiratory failure with hypoxia (HCC) Assessment & Plan    · Continue the current ventilator settings  · S/P bronchoscopy on 12/17/2018  · I appreciate Critical Care Medicine's input         Diarrhea   Assessment & Plan    · Lactulose was discontinued with a normal ammonia level       Hypophosphatemia   Assessment & Plan    · Improved with IV potassium phosphate replacement therapy  · Follow the phosphorus level     Pleural effusion   Assessment & Plan    · S/P thoracentesis by Interventional Radiology on 12/17/2018  · Follow the pleural fluid analysis results and pleural fluid culture results  · A repeat portable chest xray was completed today, 12/19/2018, with the following results:  IMPRESSION:     Bilateral lower lobe consolidations are unchanged  Differential considerations include multisegmental atelectasis or multifocal pneumonia      Small left pleural effusion  MRSA colonization   Assessment & Plan    · The patient will be maintained on contact precautions  She will be placed on nasal bactroban/mupirocin 2% ointment applied to both nares BID for 5 days  The patient will also need MRSA decolonization at [facility] including chlorhexidine/hibiclens rinse applied from the neck down to the toes daily with bathing/showering for 2 weeks  She will need a MRSA nasal screen rechecked in 2 weeks after decolonization         Transaminitis   Assessment & Plan    · Likely medication effect versus shock liver with the patient having severe sepsis with septic shock  · Follow the LFT's (liver function tests)  · Avoid all hepatotoxic agents     Hypernatremia   Assessment & Plan    · Improved with the increase of free water flushes to 200 mL every 6 hours via PEG tube  · Follow the sodium level       Hypokalemia   Assessment & Plan    · Replete with IV potassium chloride  · Follow the potassium level and magnesium level     Hyperammonemia (HCC)   Assessment & Plan    · Likely medication effect of her seizure medication regimen  · The patient currently has a normal ammonia level  · Discontinue lactulose with the diarrhea and with a normal ammonia level:  Results for Robel De La Garza (MRN [id number]) as of 12/19/2018 17:07   Ref  Range 12/19/2018 07:22   Ammonia Latest Ref Range: 11 - 35 umol/L 22        Lactic acidosis   Assessment & Plan    · Improving  · Follow the lactic acid level     Lennox-Gastaut syndrome with tonic seizures (HCC)   Assessment & Plan    · Anti-epileptic drug regimen was reviewed in detail between Dr Palomo Hadley and the patient's primary epileptologist on 12/17/2018  · The patient's phenobarbital was decreased to hopefully improve her mentation in anticipation of possible weaning of the ventilator  · The Perampanel was increased to 10 mg QHS  · An EEG was completed today, 12/19/2018, with the following results/impression:  Interpretation: This is an abnormal 39 minutes encephalopathic EEG due to frequent recurrent electrographic seizures with paroxysmal generalized fast activity (PGFA) leading to generalized rhythmic discharges, associated with spontaneous eye opening, upward deviation, which is commonly seen in generalized tonic seizures and Lennox Gastaut Syndrome  In this 39 minute study, comparative to prior continuous EEG monitoring study, is relatively similar in regards to frequency of seizures, but seizure count is variable throughout the day  Excessive beta activity, widespread beta activity, and sleep spindles likely indicate effect from general anesthesia      · The patient was on an IV Propofol drip/infusion at the time of the EEG  · I discussed the case with Dr Juliocesar Gr (Neurology)  · The patient was placed on an IV Versed (Midazolam) drip/infusion    The patient will be transferred to a higher level of care for 24-hour EEG monitoring and for a daily Neurology evaluation    The patient will be transferred to [address] [place] [address] on the service of Dr Ibrahima Vanessa (Critical Care Attending Physician)  Discharging Physician / Practitioner: Carlos Willingham DO  PCP: Rosa Aguilar DO  Admission Date: 12/14/18  Discharge Date: 12/19/18      Consultations During Hospital Stay:  · Critical Care Medicine/Pulmonology  · Interventional Radiology      Significant Findings / Test Results:   Xr Chest Portable    Result Date: 12/19/2018  Impression: Bilateral lower lobe consolidations are unchanged  Differential considerations include multisegmental atelectasis or multifocal pneumonia  Small left pleural effusion  Workstation performed: [id number]     Xr Chest Portable    Result Date: 12/18/2018  Impression: 1  Patchy bilateral airspace opacities suspicious for pulmonary edema versus multifocal infection  2   Bilateral pleural effusions  3   Endotracheal tube tip is approximately 1 5 cm above the gene  Retraction approximately 2 cm could be considered if clinically warranted  Workstation performed: [id number]     Xr Chest Portable    Result Date: 12/17/2018  Impression: Bilateral pleural effusions with adjacent basal opacities, pleural effusion component has increased  The study was marked in [facility] for immediate notification  Workstation performed: [id number]     Xr Chest Portable    Result Date: 12/16/2018  Impression: No complication after line placement Workstation performed: [id number]     Xr Chest 1 View Portable    Result Date: 12/14/2018  Impression: Suggestion of left basilar retrocardiac atelectasis/small infiltrate  Consider PA and lateral views  Workstation performed: [id number]     Ir Thoracentesis    Result Date: 12/19/2018  Impression: Technically successful ultrasound-guided thoracentesis  This is the end of the clinically relevant portion of this report  Subsequent information is for compliance, documentation, and coding purposes  In the process of informed consent, information was communicated, verbally, to the patient regarding the procedure    The patient was informed of; the name of the procedure, nature of the procedure, nature of the condition, risks, benefits, alternatives, and potential complications, as well as the consequences of not having the procedure  All the patient's questions were answered  Informed consent was signed  Quoted risks include bleeding, and pneumothorax, including axial vacuo pneumothorax  Chlorhexidine and alcohol was used for cleansing and sterile preparation of the skin  This was allowed to dry prior to the application of sterile draping  Timeout was performed, with all team members present, and in agreement  Confirmation of patient, procedure, laterally, allergies, and all needed equipment was performed  PROCEDURE: Thoracentesis PREPROCEDURE DIAGNOSIS: Please see "history ", above POSTPROCEDURE DIAGNOSIS: Same ANTIBIOTICS: None SPECIMEN: Pleural fluid ESTIMATED BLOOD LOSS: None ANESTHESIA: Local Ultrasound was used to evaluate the pleural fluid as a potential access site  Static and real-time images of needle entry were obtained, and archived  Workstation performed: [id number]     Ct Chest Abdomen Pelvis W Contrast    Result Date: 12/15/2018  Impression: Minimal aeration of the left lung with atelectasis and/or consolidations  Large left-sided pleural effusion  Abrupt cut off of the left may stem bronchus which may be due to mucous plugging however other etiology such as obstructing mass cannot be ruled out  Multiple airspace opacities in the right lung  Findings may related to infection  Follow-up to resolution is recommended  Multiple new pulmonary nodules in the right lung measuring up to 5 mm  Recommend short-term follow-up CT scan in 3-6 months  Gastrostomy tube tip cannot be adequately evaluated due to underdistended stomach    Recommend clinical correlation for positioning of gastrostomy tube  I personally discussed this study with Sonia Silva on 12/15/2018 at 2:42 AM  Workstation performed: [id number]     Xr Chest Portable Icu    Result Date: 12/16/2018  Impression: 1  Worsening, diffuse interstitial prominence representing interstitial edema versus diffuse pneumonia  2   Alveolar infiltrates in the right middle and lower lobes  3   Obscuration of the left hemidiaphragm, secondary to left pleural effusion and atelectasis of the lingula and left lower lobes  Immediate reading was not called, given the patient has had a subsequent, follow-up chest radiograph, prior to this dictation  Workstation performed: [id number]     ECG 12 lead   Order: [id number]   Status:  Final result   Visible to patient:  No (Inaccessible in MyChart)   Next appt:  None    Ref Range & Units 12/17/18 0248   Ventricular Rate BPM 96    Atrial Rate BPM 96    ND Interval ms 118    QRSD Interval ms 60    QT Interval ms 286    QTC Interval ms 361    P Axis degrees 45    QRS Axis degrees 68    T Wave Axis degrees 13    Narrative     Normal sinus rhythm  Normal ECG  When compared with ECG of 16-DEC-2018 23:09,  Non-specific change in ST segment in Inferior leads  Confirmed by Dwayne Myers ([id number]) on 12/17/2018 8:13:55 AM      Specimen Collected: 12/17/18 02:48   Last Resulted: 12/17/18 08:13              Echocardiogram (12/17/2018):  SUMMARY     LEFT VENTRICLE:  Systolic function was normal  Ejection fraction was estimated to be 65 %  There were no regional wall motion abnormalities      RIGHT VENTRICLE:  The size was normal   Systolic function was normal      PROCEDURE: The procedure was performed at the bedside  This was a routine study  The transthoracic approach was used  The study included limited 2D imaging  The heart rate was 80 bpm, at the start of the study      LEFT VENTRICLE: Size was normal  Systolic function was normal  Ejection fraction was estimated to be 65 %  There were no regional wall motion abnormalities   Wall thickness was normal      RIGHT VENTRICLE: The size was normal  Systolic function was normal  Wall thickness was normal      LEFT ATRIUM: Size was normal      RIGHT ATRIUM: Size was normal      MITRAL VALVE: Valve structure was normal  There was normal leaflet separation  DOPPLER: The transmitral velocity was within the normal range  There was no evidence for stenosis  There was no significant regurgitation      AORTIC VALVE: The valve was trileaflet  Leaflets exhibited normal thickness and normal cuspal separation  DOPPLER: Transaortic velocity was within the normal range  There was no evidence for stenosis  There was no regurgitation      TRICUSPID VALVE: DOPPLER: There was trace regurgitation  The tricuspid jet envelope definition was inadequate for estimation of RV systolic pressure  There are no indirect findings (abnormal RV volume or geometry, altered pulmonary flow  velocity profile, or leftward septal displacement) which would suggest moderate or severe pulmonary hypertension      PULMONIC VALVE: Not assessed      PERICARDIUM: There was no pericardial effusion  The pericardium was normal in appearance      SYSTEMIC VEINS: IVC: The inferior vena cava was not well visualized      SYSTEM MEASUREMENT TABLES     2D  %FS: 30 64 %  Ao Diam: 2 15 cm  EDV(Teich): 46 83 ml  EF(Teich): 59 34 %  ESV(Teich): 19 04 ml  IVSd: 0 72 cm  LA Diam: 2 48 cm  LVIDd: 3 38 cm  LVIDs: 2 35 cm  LVPWd: 0 9 cm  SV(Teich): 27 79 ml     CW  AV Vmax: 1 45 m/s  AV maxP 38 mmHg     PW  LVOT Vmax: 0 93 m/s  LVOT maxPG: 3 44 mmHg     [employer] Accredited Echocardiography Laboratory     Prepared and electronically signed by  Sandrita Xie DO  Signed 17-Dec-2018 10:18:59    Incidental Findings:   · None     Complications:  None    Reason for Admission:  Acute respiratory failure with hypoxia and severe sepsis    Hospital Course:     Britt Persaud is a 41 y o  female patient who originally presented to the hospital on 2018 due to fevers and hypoxia at [facility]  Please see above list of diagnoses and related plan for additional information       Condition at Discharge: critical     Discharge Day Visit / Exam:     Subjective: The patient was seen and examined  The patient is currently sedated on the mechanical ventilator  Vitals: Blood Pressure: 110/67 (12/19/18 1530)  Pulse: (!) 114 (12/19/18 1530)  Temperature: 99 °F (37 2 °C) (12/19/18 1444)  Temp Source: Tympanic (12/19/18 1444)  Respirations: 15 (12/19/18 1530)  Height: 5' (152 4 cm) (12/17/18 1012)  Weight - Scale: 61 kg (134 lb 7 7 oz) (12/19/18 0600)  SpO2: 95 % (12/19/18 1530)  Exam:   Physical Exam  General:  NAD, sedated on the mechanical ventilator  HEENT:  NC/AT, mucous membranes dry, ET tube in place  Neck:  Supple, No JVP elevation  CV:  + S1, + S2, Tachycardic, Regular rhythm  Pulm:  Bibasilar crackles  Abd:  Soft, Non-tender, Non-distended  Ext:  No clubbing/cyanosis/edema  Skin:  + Facial rash involving the nose and bilateral maxillary region    Discussion with Family:  I updated the patient's father, Federico Porter, on the telephone, who was in agreement for the plan to transfer the patient to [address] [place] [address]  Discharge instructions/Information to patient and family:   See after visit summary for information provided to patient and family  Provisions for Follow-Up Care:  See after visit summary for information related to follow-up care and any pertinent home health orders  Disposition:     [address] Transfer to [facility] on the service of Dr Adis Ag (Critical Care Medicine Attending Physician)    The patient will be transferred to a higher level of care for 24-hour EEG monitoring and for a daily Neurology evaluation  The patient will be transferred to [address] [place] [address] on the service of Dr Adis Ag (Critical Care Attending Physician)  Discharge Statement:  I spent 60 minutes discharging the patient  This time was spent on the day of discharge   I had direct contact with the patient on the day of discharge  Greater than 50% of the total time was spent examining patient, answering all patient questions, arranging and discussing plan of care with patient as well as directly providing post-discharge instructions  Additional time then spent on discharge activities  Discharge Medications:  See after visit summary for reconciled discharge medications provided to patient and family        ** Please Note: This note has been constructed using a voice recognition system **

## 2018-12-19 NOTE — ASSESSMENT & PLAN NOTE
· Anti-epileptic drug regimen was reviewed in detail between Dr Jennifer Mcallister and the patient's primary epileptologist on 12/17/2018  · The patient's phenobarbital was decreased to hopefully improve her mentation in anticipation of possible weaning of the ventilator  · The Perampanel was increased to 10 mg QHS  · An EEG was completed today, 12/19/2018, with the following results/impression:  Interpretation: This is an abnormal 39 minutes encephalopathic EEG due to frequent recurrent electrographic seizures with paroxysmal generalized fast activity (PGFA) leading to generalized rhythmic discharges, associated with spontaneous eye opening, upward deviation, which is commonly seen in generalized tonic seizures and Lennox Gastaut Syndrome  In this 39 minute study, comparative to prior continuous EEG monitoring study, is relatively similar in regards to frequency of seizures, but seizure count is variable throughout the day  Excessive beta activity, widespread beta activity, and sleep spindles likely indicate effect from general anesthesia      · The patient was on an IV Propofol drip/infusion at the time of the EEG  · I discussed the case with Dr Farhat Borjas (Neurology)  · The patient was placed on an IV Versed (Midazolam) drip/infusion    The patient will be transferred to a higher level of care for 24-hour EEG monitoring and for a daily Neurology evaluation  The patient will be transferred to [address] [place] [address] on the service of Dr Marla Wood (Critical Care Attending Physician)

## 2018-12-19 NOTE — PROGRESS NOTES
Patient admitted to [address] [place] [address] on 12/14/18 for severe sepsis w/ septic shock

## 2018-12-19 NOTE — PROGRESS NOTES
Progress Note - Pulmonary   Doreatha Ridges 41 y o  female MRN: [id number]  Unit/Bed#:  Encounter: [id number]      Assessment:  · Acute hypoxemic respiratory failure  · Bilateral lower lobe aspiration pneumonia - s/p bronchoscopy on 12/17/18  No growth to date  · Severe sepsis with septic shock - shock state resolved  Completed Marik protocol  · Left pleural effusion - s/p thoracentesis on 12/17/18  Fluid analysis shows exudative process by LDH criteria  Likely parapneumonic  · Encephalopathy  · Seizure disorder with status epilepticus    Plan:  · Patient is not appropriate for ventilator weaning due to ongoing concerns for seizure activity  · Continue assist-control settings  · Discussed with Dr Sandra Allen  Arrangements are being made to transfer the patient to [facility] for continuous EEG monitoring and maximization of her anti epileptic regimen  · Continue antibiotic regimen  Cultures no growth today  Subjective:   Patient unable to offer any complaints  Per nursing and discussion with Internal Medicine, there is concern for ongoing seizure activity and arrangements were being made for to [address] [place] [address]  Ventilator weaning has not been attempted  Objective:     Vitals: Blood pressure 99/65, pulse 103, temperature 99 1 °F (37 3 °C), temperature source Tympanic, resp  rate 15, height 5' (1 524 m), weight 61 kg (134 lb 7 7 oz), SpO2 95 % , AC 14 / 400 / 30% / +5 , Body mass index is 26 26 kg/m²  Intake/Output Summary (Last 24 hours) at 12/19/18 1137  Last data filed at 12/19/18 0700   Gross per 24 hour   Intake          1525 47 ml   Output             2650 ml   Net         -1124 53 ml     Physical Exam  Gen:  Intubated, eyes open spontaneously  Does not follow commands    HEENT: Mucous membranes moist, no oral lesions, no thrush  NECK: No accessory muscle use, JVP not elevated, no adenopathy  Cardiac:  Regular, no murmur  Lungs:  Bilateral rhonchi  Abdomen: normoactive bowel sounds, soft, nontender, nondistended  :  Woods catheter in place  Extremities: 2+ pulses, no edema  Skin: warm ,dry, no rash  Neuro:  Spontaneous eye opening    Labs: I have personally reviewed pertinent lab results  Results from last 7 days  Lab Units 12/19/18  0519 12/18/18  0502 12/17/18  0528   WBC Thousand/uL 18 37* 14 51* 20 37*   HEMOGLOBIN g/dL 9 1* 9 3* 9 4*   HEMATOCRIT % 27 9* 28 2* 28 3*   PLATELETS Thousands/uL 176 222 224       Results from last 7 days  Lab Units 12/19/18  0519 12/18/18  0502 12/17/18  0528  12/14/18  1244   POTASSIUM mmol/L 3 2* 2 7* 2 8*  < >  --    CHLORIDE mmol/L 108 114* 117*  < >  --    CO2 mmol/L 27 21 21  < >  --    CO2, I-STAT mmol/L  --   --   --   --  19*   BUN mg/dL 5 8 10  < >  --    CREATININE mg/dL 0 31* 0 39* 0 32*  < >  --    CALCIUM mg/dL 7 3* 7 3* 7 3*  < >  --    ALK PHOS U/L 143* 149* 124*  < >  --    ALT U/L 74 68 55  < >  --    AST U/L 70* 76* 44  < >  --    GLUCOSE, ISTAT mg/dl  --   --   --   --  123   < > = values in this interval not displayed  Results from last 7 days  Lab Units 12/18/18  0502 12/16/18  2228 12/15/18  0645 12/14/18  0659   INR  1 46* 1 44* 1 57* 1 37*   PTT seconds  --   --   --  34       Results from last 7 days  Lab Units 12/19/18  0519 12/18/18  0502 12/17/18  0528   MAGNESIUM mg/dL 1 9 1 9 1 8     Imaging and other studies: I have personally reviewed pertinent reports  and I have personally reviewed pertinent films in PACS  Chest x-ray from today shows stable bilateral lower lobe consolidation  There is a small left pleural effusion      Bronchoscopy cultures no growth    Marylou Cloud,

## 2018-12-19 NOTE — ASSESSMENT & PLAN NOTE
· Improved with the increase of free water flushes to 200 mL every 6 hours via PEG tube  · Follow the sodium level

## 2018-12-19 NOTE — ASSESSMENT & PLAN NOTE
· Continue the current ventilator settings  · S/P bronchoscopy on 12/17/2018  · I appreciate Critical Care Medicine's input

## 2018-12-19 NOTE — ASSESSMENT & PLAN NOTE
· Likely medication effect of her seizure medication regimen  · The patient currently has a normal ammonia level  · Discontinue lactulose with the diarrhea and with a normal ammonia level:  Results for Polina Mccracken (MRN [id number]) as of 12/19/2018 17:07   Ref   Range 12/19/2018 07:22   Ammonia Latest Ref Range: 11 - 35 umol/L 22

## 2018-12-19 NOTE — ASSESSMENT & PLAN NOTE
· Anti-epileptic drug regimen was reviewed in detail between Dr Palomo Hadley and the patient's primary epileptologist on 12/17/2018  · The patient's phenobarbital was decreased to hopefully improve her mentation in anticipation of possible weaning of the ventilator  · The Perampanel was increased to 10 mg QHS  · An EEG was completed today, 12/19/2018, with the following results/impression:  Interpretation: This is an abnormal 39 minutes encephalopathic EEG due to frequent recurrent electrographic seizures with paroxysmal generalized fast activity (PGFA) leading to generalized rhythmic discharges, associated with spontaneous eye opening, upward deviation, which is commonly seen in generalized tonic seizures and Lennox Gastaut Syndrome  In this 39 minute study, comparative to prior continuous EEG monitoring study, is relatively similar in regards to frequency of seizures, but seizure count is variable throughout the day  Excessive beta activity, widespread beta activity, and sleep spindles likely indicate effect from general anesthesia      · The patient was on an IV Propofol drip/infusion at the time of the EEG  · I discussed the case with Dr Juliocesar Gr (Neurology)  · The patient was placed on an IV Versed (Midazolam) drip/infusion    The patient will be transferred to a higher level of care for 24-hour EEG monitoring and for a daily Neurology evaluation  The patient will be transferred to [address] [place] [address] on the service of Dr Ibrahima Vanessa (Critical Care Attending Physician)

## 2018-12-19 NOTE — ASSESSMENT & PLAN NOTE
· An EEG was completed today, 12/19/2018, with the following results/impression:  Interpretation: This is an abnormal 39 minutes encephalopathic EEG due to frequent recurrent electrographic seizures with paroxysmal generalized fast activity (PGFA) leading to generalized rhythmic discharges, associated with spontaneous eye opening, upward deviation, which is commonly seen in generalized tonic seizures and Lennox Gastaut Syndrome  In this 39 minute study, comparative to prior continuous EEG monitoring study, is relatively similar in regards to frequency of seizures, but seizure count is variable throughout the day  Excessive beta activity, widespread beta activity, and sleep spindles likely indicate effect from general anesthesia      · The patient was on an IV Propofol drip/infusion at the time of the EEG  · I discussed the case with Dr Dorian De Paz (Neurology)  · The patient was placed on an IV Versed (Midazolam) drip/infusion    The patient will be transferred to a higher level of care for 24-hour EEG monitoring and for a daily Neurology evaluation  The patient will be transferred to [address] [place] [address] on the service of Dr Chinedu Hoffman (Critical Care Attending Physician)

## 2018-12-19 NOTE — ASSESSMENT & PLAN NOTE
· The patient will be maintained on contact precautions  She will be placed on nasal bactroban/mupirocin 2% ointment applied to both nares BID for 5 days  The patient will also need MRSA decolonization at [facility] including chlorhexidine/hibiclens rinse applied from the neck down to the toes daily with bathing/showering for 2 weeks  She will need a MRSA nasal screen rechecked in 2 weeks after decolonization

## 2018-12-19 NOTE — ASSESSMENT & PLAN NOTE
· S/P thoracentesis by Interventional Radiology on 12/17/2018  · Follow the pleural fluid analysis results and pleural fluid culture results  · A repeat portable chest xray was completed today, 12/19/2018, with the following results:  IMPRESSION:     Bilateral lower lobe consolidations are unchanged  Differential considerations include multisegmental atelectasis or multifocal pneumonia      Small left pleural effusion

## 2018-12-19 NOTE — NURSING NOTE
Pt trying to pull at EET & in-line suction;BP 90/61;MAP 71;propofol gtt at 20 mcg/kg/min; KATHY Heck informed

## 2018-12-19 NOTE — ASSESSMENT & PLAN NOTE
· Possible aspiration pneumonia versus possible healthcare-associated pneumonia/possible gram-negative pneumonia  · Continue IV aztreonam, IV vancomycin, and IV metronidazole  · Follow culture results  · Follow the procalcitonin level  · Follow the white blood cell count

## 2018-12-19 NOTE — PROGRESS NOTES
Vancomycin Assessment    Michael Cabello is a 41 y o  female who is currently receiving vancomycin 56 Q12h for MRSA suspected, Pneumonia     Relevant clinical data and objective history reviewed:  Creatinine   Date Value Ref Range Status   12/19/2018 0 31 (L) 0 60 - 1 30 mg/dL Final     Comment:     Standardized to IDMS reference method   12/18/2018 0 39 (L) 0 60 - 1 30 mg/dL Final     Comment:     Standardized to IDMS reference method   12/17/2018 0 32 (L) 0 60 - 1 30 mg/dL Final     Comment:     Standardized to IDMS reference method   04/01/2015 0 36 (L) 0 60 - 1 30 mg/dL Final     Comment:     Standardized to IDMS reference method   02/02/2015 0 51 (L) 0 60 - 1 30 mg/dL Final     Comment:     Standardized to IDMS reference method   10/31/2014 0 50 (L) 0 60 - 1 30 mg/dL Final     Comment:     Standardized to IDMS reference method     Vancomycin Rm   Date Value Ref Range Status   10/17/2017 11 1 ug/mL Final     BP 99/65   Pulse 103   Temp 99 1 °F (37 3 °C) (Tympanic)   Resp 15   Ht 5' (1 524 m)   Wt 61 kg (134 lb 7 7 oz)   SpO2 95%   BMI 26 26 kg/m²   I/O last 3 completed shifts: In: 3743 1 [I V :1289  1; NG/GT:1504; IV Piggyback:950]  Out: 3183 [Urine:3225; GWOXS:5997]  Lab Results   Component Value Date/Time    BUN 5 12/19/2018 05:19 AM    BUN 7 04/01/2015 09:35 PM    WBC 18 37 (H) 12/19/2018 05:19 AM    WBC 13 78 (H) 04/01/2015 09:35 PM    HGB 9 1 (L) 12/19/2018 05:19 AM    HGB 12 7 04/01/2015 09:35 PM    HCT 27 9 (L) 12/19/2018 05:19 AM    HCT 39 2 04/01/2015 09:35 PM     (H) 12/19/2018 05:19 AM     (H) 04/01/2015 09:35 PM     12/19/2018 05:19 AM     04/01/2015 09:35 PM     Temp Readings from Last 3 Encounters:   12/19/18 99 1 °F (37 3 °C) (Tympanic)   10/13/18 98 °F (36 7 °C) (Temporal)   09/18/18 98 5 °F (36 9 °C) (Temporal)     Vancomycin Days of Therapy: 6    Assessment/Plan  The patient is currently on vancomycin utilizing scheduled dosing    Baseline risks associated with therapy include: dehydration  The patient is receiving [phone] with the most recent vancomycin level being at steady-state and supratherapeutic based on a goal of 15-20 (appropriate for most indications) ; therefore, after clinical evaluation will be changed to 1500 Q12h (changed for 1900 dose on 12/18)   Pharmacy will continue to follow closely for s/sx of nephrotoxicity, infusion reactions and appropriateness of therapy  BMP and CBC will be ordered per protocol  Plan for trough as patient approaches steady state, prior to the 4th  dose at approximately 0630 on 12/20/18  Pharmacy will continue to follow the patients culture results and clinical progress daily      Rufino Alexis, Pharmacist

## 2018-12-20 ENCOUNTER — APPOINTMENT (INPATIENT)
Dept: RADIOLOGY | Facility: HOSPITAL | Age: 38
DRG: 100 | End: 2018-12-20
Payer: MEDICARE

## 2018-12-20 ENCOUNTER — PATIENT OUTREACH (OUTPATIENT)
Dept: CASE MANAGEMENT | Facility: OTHER | Age: 38
End: 2018-12-20

## 2018-12-20 LAB
ALBUMIN SERPL BCP-MCNC: 1.6 G/DL (ref 3.5–5)
ALP SERPL-CCNC: 141 U/L (ref 46–116)
ALT SERPL W P-5'-P-CCNC: 66 U/L (ref 12–78)
AMMONIA PLAS-SCNC: 31 UMOL/L (ref 11–35)
ANION GAP SERPL CALCULATED.3IONS-SCNC: 6 MMOL/L (ref 4–13)
ANISOCYTOSIS BLD QL SMEAR: PRESENT
ARTERIAL PATENCY WRIST A: NO
AST SERPL W P-5'-P-CCNC: 69 U/L (ref 5–45)
BACTERIA SPEC BFLD CULT: NO GROWTH
BASE EXCESS BLDA CALC-SCNC: 3.4 MMOL/L
BASO STIPL BLD QL SMEAR: PRESENT
BASOPHILS # BLD MANUAL: 0 THOUSAND/UL (ref 0–0.1)
BASOPHILS NFR MAR MANUAL: 0 % (ref 0–1)
BILIRUB SERPL-MCNC: 0.48 MG/DL (ref 0.2–1)
BUN SERPL-MCNC: 4 MG/DL (ref 5–25)
CALCIUM SERPL-MCNC: 7.8 MG/DL (ref 8.3–10.1)
CHLORIDE SERPL-SCNC: 108 MMOL/L (ref 100–108)
CO2 SERPL-SCNC: 28 MMOL/L (ref 21–32)
CREAT SERPL-MCNC: 0.18 MG/DL (ref 0.6–1.3)
EOSINOPHIL # BLD MANUAL: 0.17 THOUSAND/UL (ref 0–0.4)
EOSINOPHIL NFR BLD MANUAL: 1 % (ref 0–6)
ERYTHROCYTE [DISTWIDTH] IN BLOOD BY AUTOMATED COUNT: 15 % (ref 11.6–15.1)
GFR SERPL CREATININE-BSD FRML MDRD: 174 ML/MIN/1.73SQ M
GLUCOSE SERPL-MCNC: 113 MG/DL (ref 65–140)
GLUCOSE SERPL-MCNC: 117 MG/DL (ref 65–140)
GLUCOSE SERPL-MCNC: 124 MG/DL (ref 65–140)
GLUCOSE SERPL-MCNC: 140 MG/DL (ref 65–140)
GLUCOSE SERPL-MCNC: 144 MG/DL (ref 65–140)
GLUCOSE SERPL-MCNC: 144 MG/DL (ref 65–140)
GRAM STN SPEC: NORMAL
GRAM STN SPEC: NORMAL
HCO3 BLDA-SCNC: 27.8 MMOL/L (ref 22–28)
HCT VFR BLD AUTO: 26.7 % (ref 34.8–46.1)
HGB BLD-MCNC: 8.8 G/DL (ref 11.5–15.4)
HOROWITZ INDEX BLDA+IHG-RTO: 30 MM[HG]
HYPERCHROMIA BLD QL SMEAR: PRESENT
LACTATE SERPL-SCNC: 2 MMOL/L (ref 0.5–2)
LYMPHOCYTES # BLD AUTO: 17 % (ref 14–44)
LYMPHOCYTES # BLD AUTO: 2.84 THOUSAND/UL (ref 0.6–4.47)
MACROCYTES BLD QL AUTO: PRESENT
MAGNESIUM SERPL-MCNC: 2.1 MG/DL (ref 1.6–2.6)
MCH RBC QN AUTO: 34.6 PG (ref 26.8–34.3)
MCHC RBC AUTO-ENTMCNC: 33 G/DL (ref 31.4–37.4)
MCV RBC AUTO: 105 FL (ref 82–98)
METAMYELOCYTES NFR BLD MANUAL: 1 % (ref 0–1)
MONOCYTES # BLD AUTO: 0.83 THOUSAND/UL (ref 0–1.22)
MONOCYTES NFR BLD: 5 % (ref 4–12)
NEUTROPHILS # BLD MANUAL: 12.52 THOUSAND/UL (ref 1.85–7.62)
NEUTS SEG NFR BLD AUTO: 75 % (ref 43–75)
NRBC BLD AUTO-RTO: 0 /100 WBCS
O2 CT BLDA-SCNC: 14.5 ML/DL (ref 16–23)
OXYHGB MFR BLDA: 95.9 % (ref 94–97)
PCO2 BLDA: 41.4 MM HG (ref 36–44)
PEEP RESPIRATORY: 5 CM[H2O]
PH BLDA: 7.45 [PH] (ref 7.35–7.45)
PHENOBARB SERPL-MCNC: 13.2 UG/ML (ref 15–40)
PHOSPHATE SERPL-MCNC: 3.2 MG/DL (ref 2.7–4.5)
PLATELET # BLD AUTO: 180 THOUSANDS/UL (ref 149–390)
PLATELET BLD QL SMEAR: ADEQUATE
PMV BLD AUTO: 11.1 FL (ref 8.9–12.7)
PO2 BLDA: 83 MM HG (ref 75–129)
POIKILOCYTOSIS BLD QL SMEAR: PRESENT
POLYCHROMASIA BLD QL SMEAR: PRESENT
POTASSIUM SERPL-SCNC: 3.8 MMOL/L (ref 3.5–5.3)
PROCALCITONIN SERPL-MCNC: 1.18 NG/ML
PROLACTIN SERPL-MCNC: 32.1 NG/ML
PROMYELOCYTES NFR BLD MANUAL: 1 % (ref 0–0)
PROT SERPL-MCNC: 4.3 G/DL (ref 6.4–8.2)
RBC # BLD AUTO: 2.54 MILLION/UL (ref 3.81–5.12)
RBC MORPH BLD: PRESENT
SMUDGE CELLS BLD QL SMEAR: PRESENT
SODIUM SERPL-SCNC: 142 MMOL/L (ref 136–145)
SPECIMEN SOURCE: ABNORMAL
VALPROATE SERPL-MCNC: 42 UG/ML (ref 50–100)
VANCOMYCIN TROUGH SERPL-MCNC: 13.6 UG/ML (ref 10–20)
VENT AC: 16
VENT- AC: AC
VT SETTING VENT: 300 ML
WBC # BLD AUTO: 16.69 THOUSAND/UL (ref 4.31–10.16)

## 2018-12-20 PROCEDURE — 84145 PROCALCITONIN (PCT): CPT | Performed by: EMERGENCY MEDICINE

## 2018-12-20 PROCEDURE — 80202 ASSAY OF VANCOMYCIN: CPT | Performed by: EMERGENCY MEDICINE

## 2018-12-20 PROCEDURE — 82948 REAGENT STRIP/BLOOD GLUCOSE: CPT

## 2018-12-20 PROCEDURE — 85007 BL SMEAR W/DIFF WBC COUNT: CPT | Performed by: EMERGENCY MEDICINE

## 2018-12-20 PROCEDURE — C9113 INJ PANTOPRAZOLE SODIUM, VIA: HCPCS | Performed by: EMERGENCY MEDICINE

## 2018-12-20 PROCEDURE — 94760 N-INVAS EAR/PLS OXIMETRY 1: CPT | Performed by: SOCIAL WORKER

## 2018-12-20 PROCEDURE — 99223 1ST HOSP IP/OBS HIGH 75: CPT | Performed by: PSYCHIATRY & NEUROLOGY

## 2018-12-20 PROCEDURE — 84100 ASSAY OF PHOSPHORUS: CPT | Performed by: EMERGENCY MEDICINE

## 2018-12-20 PROCEDURE — 94003 VENT MGMT INPAT SUBQ DAY: CPT

## 2018-12-20 PROCEDURE — 94760 N-INVAS EAR/PLS OXIMETRY 1: CPT

## 2018-12-20 PROCEDURE — 99291 CRITICAL CARE FIRST HOUR: CPT | Performed by: INTERNAL MEDICINE

## 2018-12-20 PROCEDURE — 80164 ASSAY DIPROPYLACETIC ACD TOT: CPT | Performed by: NURSE PRACTITIONER

## 2018-12-20 PROCEDURE — 83735 ASSAY OF MAGNESIUM: CPT | Performed by: EMERGENCY MEDICINE

## 2018-12-20 PROCEDURE — 36600 WITHDRAWAL OF ARTERIAL BLOOD: CPT | Performed by: SOCIAL WORKER

## 2018-12-20 PROCEDURE — 80053 COMPREHEN METABOLIC PANEL: CPT | Performed by: EMERGENCY MEDICINE

## 2018-12-20 PROCEDURE — 82140 ASSAY OF AMMONIA: CPT | Performed by: EMERGENCY MEDICINE

## 2018-12-20 PROCEDURE — 71045 X-RAY EXAM CHEST 1 VIEW: CPT

## 2018-12-20 PROCEDURE — 85027 COMPLETE CBC AUTOMATED: CPT | Performed by: EMERGENCY MEDICINE

## 2018-12-20 PROCEDURE — 80184 ASSAY OF PHENOBARBITAL: CPT | Performed by: NURSE PRACTITIONER

## 2018-12-20 PROCEDURE — 82805 BLOOD GASES W/O2 SATURATION: CPT | Performed by: NURSE PRACTITIONER

## 2018-12-20 RX ORDER — LEVETIRACETAM 100 MG/ML
1000 SOLUTION ORAL EVERY 12 HOURS SCHEDULED
Status: DISCONTINUED | OUTPATIENT
Start: 2018-12-20 | End: 2018-12-24 | Stop reason: HOSPADM

## 2018-12-20 RX ORDER — FAMOTIDINE 40 MG/5ML
20 POWDER, FOR SUSPENSION ORAL 2 TIMES DAILY
Status: DISCONTINUED | OUTPATIENT
Start: 2018-12-20 | End: 2018-12-21

## 2018-12-20 RX ORDER — VANCOMYCIN HYDROCHLORIDE 1 G/200ML
15 INJECTION, SOLUTION INTRAVENOUS EVERY 8 HOURS
Status: COMPLETED | OUTPATIENT
Start: 2018-12-20 | End: 2018-12-21

## 2018-12-20 RX ORDER — PANTOPRAZOLE SODIUM 40 MG/1
40 INJECTION, POWDER, FOR SOLUTION INTRAVENOUS
Status: DISCONTINUED | OUTPATIENT
Start: 2018-12-20 | End: 2018-12-20

## 2018-12-20 RX ORDER — FAMOTIDINE 40 MG/5ML
20 POWDER, FOR SUSPENSION ORAL 2 TIMES DAILY
Status: DISCONTINUED | OUTPATIENT
Start: 2018-12-20 | End: 2018-12-20

## 2018-12-20 RX ADMIN — VALPROIC ACID 250 MG: 500 SOLUTION ORAL at 21:10

## 2018-12-20 RX ADMIN — HEPARIN SODIUM 5000 UNITS: 5000 INJECTION INTRAVENOUS; SUBCUTANEOUS at 13:35

## 2018-12-20 RX ADMIN — LEVOCARNITINE 750 MG: 1 SOLUTION ORAL at 16:28

## 2018-12-20 RX ADMIN — LEVETIRACETAM 1000 MG: 100 SOLUTION ORAL at 21:09

## 2018-12-20 RX ADMIN — METRONIDAZOLE 500 MG: 500 INJECTION, SOLUTION INTRAVENOUS at 06:28

## 2018-12-20 RX ADMIN — HEPARIN SODIUM 5000 UNITS: 5000 INJECTION INTRAVENOUS; SUBCUTANEOUS at 21:07

## 2018-12-20 RX ADMIN — METRONIDAZOLE 500 MG: 500 INJECTION, SOLUTION INTRAVENOUS at 14:47

## 2018-12-20 RX ADMIN — LEVETIRACETAM 1000 MG: 100 INJECTION, SOLUTION INTRAVENOUS at 09:34

## 2018-12-20 RX ADMIN — TOPIRAMATE 200 MG: 100 TABLET, FILM COATED ORAL at 09:39

## 2018-12-20 RX ADMIN — LEVOCARNITINE 750 MG: 1 SOLUTION ORAL at 09:34

## 2018-12-20 RX ADMIN — AZTREONAM 1000 MG: 1 INJECTION, POWDER, LYOPHILIZED, FOR SOLUTION INTRAMUSCULAR; INTRAVENOUS at 07:35

## 2018-12-20 RX ADMIN — CHLORHEXIDINE GLUCONATE 0.12% ORAL RINSE 15 ML: 1.2 LIQUID ORAL at 21:07

## 2018-12-20 RX ADMIN — PHENOBARBITAL 40 MG: 20 LIQUID ORAL at 21:08

## 2018-12-20 RX ADMIN — VALPROIC ACID 250 MG: 500 SOLUTION ORAL at 05:20

## 2018-12-20 RX ADMIN — TOPIRAMATE 200 MG: 100 TABLET, FILM COATED ORAL at 18:20

## 2018-12-20 RX ADMIN — AZTREONAM 1000 MG: 1 INJECTION, POWDER, LYOPHILIZED, FOR SOLUTION INTRAMUSCULAR; INTRAVENOUS at 22:55

## 2018-12-20 RX ADMIN — FAMOTIDINE 20 MG: 40 POWDER, FOR SUSPENSION ORAL at 11:36

## 2018-12-20 RX ADMIN — PHENOBARBITAL 40 MG: 20 LIQUID ORAL at 02:15

## 2018-12-20 RX ADMIN — VANCOMYCIN HYDROCHLORIDE 1000 MG: 1 INJECTION, SOLUTION INTRAVENOUS at 16:39

## 2018-12-20 RX ADMIN — VALPROIC ACID 250 MG: 500 SOLUTION ORAL at 14:05

## 2018-12-20 RX ADMIN — HEPARIN SODIUM 5000 UNITS: 5000 INJECTION INTRAVENOUS; SUBCUTANEOUS at 05:18

## 2018-12-20 RX ADMIN — PERAMPANEL 10 MG: 4 TABLET ORAL at 13:34

## 2018-12-20 RX ADMIN — AZTREONAM 1000 MG: 1 INJECTION, POWDER, LYOPHILIZED, FOR SOLUTION INTRAMUSCULAR; INTRAVENOUS at 14:06

## 2018-12-20 RX ADMIN — RUFINAMIDE 1200 MG: 200 TABLET, FILM COATED ORAL at 09:38

## 2018-12-20 RX ADMIN — LEVOCARNITINE 750 MG: 1 SOLUTION ORAL at 21:08

## 2018-12-20 RX ADMIN — FAMOTIDINE 20 MG: 40 POWDER, FOR SUSPENSION ORAL at 18:20

## 2018-12-20 RX ADMIN — RUFINAMIDE 1200 MG: 200 TABLET, FILM COATED ORAL at 16:28

## 2018-12-20 RX ADMIN — MIDAZOLAM 4 MG/HR: 5 INJECTION INTRAMUSCULAR; INTRAVENOUS at 11:40

## 2018-12-20 RX ADMIN — METRONIDAZOLE 500 MG: 500 INJECTION, SOLUTION INTRAVENOUS at 22:08

## 2018-12-20 RX ADMIN — VANCOMYCIN HYDROCHLORIDE 1000 MG: 1 INJECTION, SOLUTION INTRAVENOUS at 09:34

## 2018-12-20 RX ADMIN — PANTOPRAZOLE SODIUM 40 MG: 40 INJECTION, POWDER, FOR SOLUTION INTRAVENOUS at 05:17

## 2018-12-20 RX ADMIN — CHLORHEXIDINE GLUCONATE 0.12% ORAL RINSE 15 ML: 1.2 LIQUID ORAL at 09:34

## 2018-12-20 NOTE — RESPIRATORY THERAPY NOTE
RT Protocol Note  Jean Carlos Grewal 41 y o  female MRN: [id number]  Unit/Bed#: MICU 05 Encounter: [id number]    Assessment    Active Problems:    * No active hospital problems  *      Home Pulmonary Medications:  n/a       Past Medical History:   Diagnosis Date    ADHD     Anoxic brain damage (HCC)     Autistic disorder     Hyperammonemia (HCC)     Hyperkeratosis     Hypotension     Intellectual disability     Intellectual disability     Lennox-Gastaut syndrome with tonic seizures (HCC)     Lethargy     Liver enzyme elevation     Onychomycosis     Osteoporosis     Osteoporosis     Psychiatric disorder     anxiety     Social History     Social History    Marital status: Single     Spouse name: N/A    Number of children: N/A    Years of education: N/A     Social History Main Topics    Smoking status: Never Smoker    Smokeless tobacco: Never Used    Alcohol use No    Drug use: No    Sexual activity: No     Other Topics Concern    Not on file     Social History Narrative    No narrative on file       Subjective         Objective    Physical Exam:   Assessment Type: Assess only  General Appearance: Awake  Respiratory Pattern: Normal, Assisted  Chest Assessment: Chest expansion symmetrical  Bilateral Breath Sounds: Clear    Vitals:  Blood pressure 105/79, pulse (!) 120, temperature 98 5 °F (36 9 °C), temperature source Oral, resp  rate 16, height 5' (1 524 m), weight 60 kg (132 lb 4 4 oz), SpO2 96 %  Results from last 7 days  Lab Units 12/16/18  0823   PH ART  7 382   PCO2 ART mm Hg 29 9*   PO2 ART mm Hg 77 4   HCO3 ART mmol/L 17 4*   BASE EXC ART mmol/L -6 7   O2 CONTENT ART mL/dL 14 5*   O2 HGB, ARTERIAL % 95 0   ABG SOURCE  Radial, Right   REZA TEST  Yes       Imaging and other studies: I have personally reviewed pertinent reports  Plan    Respiratory Plan: (P) Vent/NIV/HFNC        Resp Comments: (P) Received pt orally intubated from [employer]    Pt placed on 840 with settings per physcian order  Pt jennifer current settings well   Will continue to monitor   (No bronchodilator therapy required at this time )

## 2018-12-20 NOTE — CONSULTS
Consultation - Neurology   Jakub Arias 41 y o  female MRN: [id number]  Unit/Bed#: MICU 05 Encounter: [id number]      Assessment/Plan   Assessment:  Jakub Arias is a 41 y o  female with a past medical history of Lennox-Gastaut syndrome, intellectual disability, anoxic brain injury who originally presented to [facility] with hypoxia and fever  She was found to have septic shock in the setting of aspiration vs healthcare associated pneumonia  She was transferred to [facility] for possibility of video monitoring  At baseline, patient experiences approximately 10 seizures an hour  She is nonverbal at baseline  As patient is able to open eyes spontaneously and exhibit purposeful movements, do not see need for vEEG at this time  This was discussed with primary team  Will consider increasing dose of AEDs as levels are slightly low, but this is likely corrected for due to the patient's low albumin  Per Dr Ashtyn Villegas, patient has "intractable epilepsy, unlikely to achieve seizure freedom "    Plan:  - Continue Keppra 1000 mg BID, Phenobarbital 40 mg QHS, Banzel 1200 BID, Topamax 200 BID, Valproic acid 250 mg Q8    - Spoke with critical care pharmacist who states they have obtained Fycompa to be given to patient as well as this was seemingly the most helpful AED in the past, per review of Dr Reggie Tucker notes     - VNS in place  - Phenobarbital level low, 13 2   - Valproic acid total level low, 42   - Reviewed ammonia level, normal 31  - Medical management per primary team   - Further recommendations per attending neurologist    History of Present Illness     Reason for Consult / Principal Problem: Lennox-Gastaut   Hx and PE limited by: Patient nonverbal, intubated, sedated with Versed    HPI: Jakub Arias is a 41 y o   female with a past medical history of Lennox-Gastaut syndrome, intellectual disability, anoxic brain injury who originally presented to [facility] on 12/14/2018 with hypoxia and fever   She presented from [facility]  She was exhibiting shortness of breath with oxygen saturation 85% and fever  T-max 103°  Imaging revealed multifocal pneumonia, was worsened with serial chest x-rays  She was found to be in septic shock, likely secondary to aspiration pneumonia versus healthcare associated pneumonia versus gram-negative pneumonia  She was being treated at [facility] but was transferred to [facility] for the possibility of video EEG monitoring  She is nonverbal at baseline  She has a PEG tube but can also get oral feedings  The patient has an extremely complex seizure history  Dr Anand Eddy has been following the patient closely  In short, per Dr Robinson Hickman office notes, the patient is highly unlikely to achieve seizure freedom due to her underlying conditions  She does have a history of status epilepticus  Per chart review, Dr Aristeo Bartholomew states that her seizures consist of drop attacks with grunting sounds and falling to the floor, myoclonic jerking of the right arm, generalized convulsions, and tonic seizures with her eyes rolling backwards that mostly occur during sleep  She has tried a variety of anti-epileptic medications and has a VNS in place  See his most recent office visit note on 9/4/18 for detailed explaination of patient's seizure history  After examining the patient, spoke with nursing who states the patient has not shown any clear evidence of seizures throughout the morning  She reports the patient has opened her eyes spontaneously         Inpatient consult to Neurology  Consult performed by: Karin Knox  Consult ordered by: Chacho Dixon          Review of Systems   Unable to perform ROS: Patient nonverbal       Historical Information   Past Medical History:   Diagnosis Date    ADHD     Anoxic brain damage ([facility] [address] )     Autistic disorder     Hyperammonemia ([facility] [address] )     Hyperkeratosis     Hypotension     Intellectual disability     Intellectual disability     Lennox-Gastaut syndrome with tonic seizures (HCC)     Lethargy     Liver enzyme elevation     Onychomycosis     Osteoporosis     Osteoporosis     Psychiatric disorder     anxiety     Past Surgical History:   Procedure Laterality Date    ABDOMINAL SURGERY      CARDIAC PACEMAKER PLACEMENT      IR THORACENTESIS  12/17/2018    JEJUNOSTOMY FEEDING TUBE      PEG TUBE PLACEMENT       Social History   History   Alcohol Use No     History   Drug Use No     History   Smoking Status    Never Smoker   Smokeless Tobacco    Never Used     Family History: non-contributory    Review of previous medical records was completed       Meds/Allergies   all current active meds have been reviewed and current meds:   Current Facility-Administered Medications   Medication Dose Route Frequency    aztreonam (AZACTAM) 1,000 mg in sodium chloride 0 9 % 50 mL IVPB  1,000 mg Intravenous Q8H    chlorhexidine (PERIDEX) 0 12 % oral rinse 15 mL  15 mL Swish & Spit Q12H [address]    famotidine (PEPCID) oral suspension 20 mg  20 mg Oral BID    heparin (porcine) subcutaneous injection 5,000 Units  5,000 Units Subcutaneous Q8H [address]    insulin lispro (HumaLOG) 100 units/mL subcutaneous injection 1-5 Units  1-5 Units Subcutaneous Q6H [address]    levETIRAcetam (KEPPRA) oral solution 1,000 mg  1,000 mg Oral Q12H [address]    levOCARNitine (CARNITOR) oral solution 750 mg  750 mg Oral TID    metroNIDAZOLE (FLAGYL) IVPB (premix) 500 mg  500 mg Intravenous Q8H    midazolam (VERSED) 0 5 mg/mL (STANDARD CONCENTRATION) 100 mL infusion  4 mg/hr Intravenous Continuous    ondansetron (ZOFRAN) injection 4 mg  4 mg Intravenous Q4H PRN    Perampanel tablet 10 mg  10 mg Per NG Tube HS    PHENobarbital oral elixir 40 mg  40 mg Oral HS    rufinamide (BANZEL) tablet 1,200 mg  1,200 mg Oral BID With Meals    topiramate (TOPAMAX) tablet 200 mg  200 mg Oral BID    valproic acid (DEPAKENE) 250 MG/5ML oral soln 250 mg  250 mg Oral Q8H [address]    vancomycin (VANCOCIN) IVPB (premix) 1,000 mg  15 mg/kg Intravenous Q8H       Allergies   Allergen Reactions    Felbamate        Objective   Vitals:Blood pressure 91/66, pulse 84, temperature 99 5 °F (37 5 °C), temperature source Rectal, resp  rate 18, height 5' (1 524 m), weight 60 kg (132 lb 4 4 oz), SpO2 97 %  ,Body mass index is 25 83 kg/m²  Intake/Output Summary (Last 24 hours) at 12/20/18 1156  Last data filed at 12/20/18 1139   Gross per 24 hour   Intake             1298 ml   Output             4100 ml   Net            -2802 ml       Invasive Devices: Invasive Devices     Central Venous Catheter Line            CVC Central Lines 12/15/18 Triple Right Internal jugular 4 days          Drain            Gastrostomy/Enterostomy Gastrostomy LUQ -- days    Gastrostomy/Enterostomy Gastrostomy 18 Fr   days    Urethral Catheter Latex;Straight-tip 16 Fr  5 days          Airway            ETT  Cuffed;Oral;Hi-Lo 6 5 mm 5 days          Nasogastric/Orogastric Tube            Feeding Tube 687 days                Physical Exam   Constitutional:   Patient nonverbal at baseline  Intubated and sedated  HENT:   Head: Normocephalic and atraumatic  Right Ear: External ear normal    Left Ear: External ear normal    Nose: Nose normal    Eyes:   Anisocoria present, left greater than right  Slow to react to light bilaterally  EOMs unable to assess, but patient's gaze will cross midline  Neck: Neck supple  Cardiovascular: Normal rate and regular rhythm  Pulmonary/Chest: No stridor  No respiratory distress  Intubated   Abdominal: Soft  Skin: Skin is warm and dry  No rash noted  No erythema  Psychiatric:   Nonverbal       Neurologic Exam     Mental Status   Patient nonverbal at baseline  Sedated and intubated currently  Cranial Nerves   Unable to assess visual acuity  Does not blink to visual threats but can open and close eyes spontaneously  Anisocoria present, left greater than right  Pupils minimally reactive to light  Gaze towards left, but crosses midline  No evidence of facial asymmetry  Unable to assess remaining cranial nerves  Motor Exam   Overall muscle tone: decreasedMoves all extremities spontaneously  Grimaces with noxious stimuli  Sensory Exam   Unable to assess- Patient nonverbal, intubated, sedated  Gait, Coordination, and Reflexes Gait unable to be assessed  No obvious tremors noted  No clonus  Negative Babinski bilaterally  Lab Results: I have personally reviewed pertinent reports  Imaging Studies: I have personally reviewed pertinent reports  and I have personally reviewed pertinent films in PACS  EKG, Pathology, and Other Studies: I have personally reviewed pertinent reports  VTE Prophylaxis: Heparin    Counseling / Coordination of Care  Total time spent today 50 minutes  Greater than 50% of total time was spent with the patient and / or family counseling and / or coordination of care   A description of the counseling / coordination of care: Reviewed chart, discussed with primary team, and discussed case with attending neurologist

## 2018-12-20 NOTE — NUTRITION
Recommend increase Jevity 1 2 to goal rate of 42 ml/hr to provide qd: 1008 ml,1210 Kcal,56 gm PRO,171 gm CHO,40 gm Fat,813 ml Free H2O,2 0 mg CHO/kg/min  Monitor CAROL level and LFTs

## 2018-12-20 NOTE — UTILIZATION REVIEW
Initial Clinical Review    Admission: Date/Time/Statement: 12/19/18 @ 2202   TRANSFER FROM [facility] TO [facility] MICU FOR TREATMENT OF SEPTIC SHOCK S/P ASPIRATION PNEUMONITIS AND NOW DEVELOPING SEIZURES  Orders Placed This Encounter   Procedures    Inpatient Admission     Standing Status:   Standing     Number of Occurrences:   1     Order Specific Question:   Admitting Physician     Answer:   Ketan Smiley [[id number]]     Order Specific Question:   Level of Care     Answer:   Critical Care [15]     Order Specific Question:   Estimated length of stay     Answer:   More than 2 Midnights     Order Specific Question:   Certification     Answer:   I certify that inpatient services are medically necessary for this patient for a duration of greater than two midnights  See H&P and MD Progress Notes for additional information about the patient's course of treatment  History of Illness: Gudelia Sampson is a 41 y o  female with PMH Lennox Gastaut syndrome and intellectual disability initially presented to the ED at [facility] on 12/14 with fever (103), hypoxia (spO2 85%), and vomiting  She was admitted for septic shock secondary to pneumonia, and started on broad spectrum antibiotics  Pt was intubated for acute hypoxic respiratory failure on 12/15  An EEG on 12/19 showed frequent recurrent electrographic seizures, 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 for continuous EEG monitoring  Pt is at her reported baseline  She makes some purposeful movements, but does not follow commands   Pt reportedly "looks up at the ceiling" during her seizures, but unable to determine if pt is seizing at this time       ED Vital Signs:   ED Triage Vitals   Temperature Pulse Respirations Blood Pressure SpO2   12/19/18 2216 12/19/18 2216 12/19/18 2216 12/19/18 2216 12/19/18 2216   98 5 °F (36 9 °C) (!) 120 16 105/79 97 %      Temp Source Heart Rate Source Patient Position - Orthostatic VS BP Location FiO2 (%)   12/19/18 2216 12/20/18 0700 -- 12/20/18 0700 --   Oral Monitor  Right arm       Pain Score       12/19/18 2216       No Pain        Wt Readings from Last 1 Encounters:   12/19/18 60 kg (132 lb 4 4 oz)     Vital Signs (abnormal):   12/20/18 1000  --  84  20   86/58  65  99 %   12/20/18 0700  99 5 °F (37 5 °C)  88  16   88/58  67  98 %   12/20/18 0600  --  92   32   86/53  63  97 %   12/20/18 0500  --  86  16   86/70  75  96 %   12/19/18 2300  --   112  15  108/68  85  97 %   12/19/18 2216  98 5 °F (36 9 °C)   120  16  105/79  93  97 %     Abnormal Labs:     12/20/18 0510   BUN 4     Creatinine 0 18     Calcium 7 8     AST 69     Alkaline Phosphatase 141     Total Protein 4 3     Albumin 1 6        12/20/18 0510   WBC 16 69     RBC 2 54     Hemoglobin 8 8     Hematocrit 26 7          MCH 34 6       POC glucose 144  Procalcitonin 1 18    Diagnostic Test Results:     12/19 CXR - Perihilar and basilar opacity slightly improving compared to the prior examination may represent improving edema or pneumonia  12/20 CXR - Repositioned ET tube now projects within the mid trachea  Stable perihilar and basilar opacities with suspected small effusions      Past Medical/Surgical History:    Active Ambulatory Problems     Diagnosis Date Noted    Pneumonia 02/22/2017    Acute encephalopathy 02/23/2017    Lennox-Gastaut syndrome with tonic seizures ([facility] ) 07/06/2017    Lactic acidosis 07/06/2017    Underweight 11/22/2017    Intellectual disability 11/22/2017    Hyperammonemia (HCC) 11/23/2017    Hypokalemia 11/24/2017    Acute respiratory failure with hypoxia (HCC) 12/02/2017    Hypernatremia 12/06/2017    Osteoporosis 09/17/2013    Onychomycosis 09/17/2013    Lethargy 10/10/2016    Hyperkeratosis 09/17/2013    Intractable epilepsy without status epilepticus ([facility] ) 04/23/2018  Somnolence 08/18/2018    Transaminitis 08/18/2018    Hypotension 08/20/2018    Incontinence 08/23/2018    Asp[facility] pneumonia [facility] ) 10/07/2018    Severe sepsis with septic shock (CODE) ([facility] ) 10/09/2018    Skin breakdown 10/09/2018    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    Hypoalbuminemia 10/10/2018    Right atrial enlargement 10/13/2018    Pleural effusion 12/16/2018    Electrolyte abnormality 12/17/2018    Seizure disorder ([facility] ) 12/18/2018    Hypophosphatemia 12/18/2018    Diarrhea 12/18/2018    Status epilepticus ([facility] ) 12/19/2018     Resolved Ambulatory Problems     Diagnosis Date Noted    Dysphagia 02/22/2017    HCAP (healthcare-associated pneumonia) 02/23/2017    Positive blood culture 07/07/2017    Gastrostomy tube obstruction (HCC) 07/14/2017    Excessive daytime sleepiness 11/22/2017    Acute DANIEL (middle ear effusion) 11/23/2017    Macrocytic anemia 11/24/2017    Elevated liver enzymes 06/21/2016    Cerebral anoxic injury ([facility] ) 09/17/2013    ADHD, 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    Acute hepatic encephalopathy 08/18/2018    Sepsis due to undetermined organism ([facility] ) 09/07/2018    Encephalopathy 10/07/2018    Elevated MCV 10/10/2018     Past Medical History:   Diagnosis Date    ADHD     Anoxic brain damage (HCC)     Autistic disorder     Hyperammonemia (HCC)     Hyperkeratosis     Hypotension     Intellectual disability     Intellectual disability     Lennox-Gastaut syndrome with tonic seizures (HCC)     Lethargy     Liver enzyme elevation     Onychomycosis     Osteoporosis     Osteoporosis     Psychiatric disorder      Admitting Diagnosis: Severe sepsis without septic shock (CODE) ([facility]) [R65 20]    Age/Sex: 41 y o  female    Assessment/Plan:   1  Seizure:  History of Louvenia Heft with refractory and difficult to control seizure activity despite multiple home anti-epileptics    Seizure activity at [facility] despite propofol infusion, has since been switched to midazolam infusion, will continue for now  Plan for continuous video EEG  Continue home keppra, perampanel, rufinamide, topamax, depakote, and phenobarbital   Further titration of regimen will depend on results of vEEG  Baseline is apparently that she does not follow commands  Will send lactate and prolactin  2   Pneumonia:  Patient admitted to [facility] with concern for aspiration pneumonia, but also at risk for healthcare associated pathogens given residence in [facility]  Shock has resolved, s/p Marik protocol, no pressor requirement at present  Antibiotics started 12/14 (Day 6), currently on azactam, vanc, and flagyl  Will repeat procalcitonin  Blood cultures from 12/14 with no growth, repeat from 12/19 pending  Sputum culture from bronch 12/17 with no growth  Will need swallow eval once extubated  3   Acute respiratory failure:  Mental status and concern for ongoing seizures preclude extubation at this time  Continue lung protective ventilation  VAP bundle  S/p bronch 12/17 as well as thoracentesis for left pleural effusion (likely parapneumonic) 12/17      Admission Orders:  Scheduled Meds:   Current Facility-Administered Medications:  aztreonam 1,000 mg Intravenous Q8H Last Rate: Stopped (12/20/18 9172)   chlorhexidine 15 mL Swish & Spit Q12H [address]    famotidine 20 mg Oral BID    heparin (porcine) 5,000 Units Subcutaneous Q8H [address]    insulin lispro 1-5 Units Subcutaneous Q6H [address]    levETIRAcetam 1,000 mg Oral Q12H [address]    levOCARNitine 750 mg Oral TID    metroNIDAZOLE 500 mg Intravenous Q8H Last Rate: Stopped (12/20/18 0546)   midazolam 4 mg/hr Intravenous Continuous Last Rate: 4 mg/hr (12/20/18 1140)   ondansetron 4 mg Intravenous Q4H PRN    Perampanel 10 mg Per NG Tube HS    PHENobarbital 40 mg Oral HS    rufinamide 1,200 mg Oral BID With Meals    topiramate 200 mg Oral BID    valproic acid 250 mg Oral Q8H [address]    vancomycin 15 mg/kg Intravenous Q8H Last Rate: Stopped (12/20/18 1399)     Continuous Infusions:   midazolam 4 mg/hr Last Rate: 4 mg/hr (12/20/18 1140)     PRN Meds: ondansetron    MICU   Mechanical ventilation   SCDs  24 hr EEG video monitoring   Up w/ assist   Daily wt  Neuro checks q 2 hr   POC glucose q 6 hr   Restraints nonviolent   Phenobarb and valproic acid levels   Tube feeding   Cons Neurology

## 2018-12-20 NOTE — PLAN OF CARE
CARDIOVASCULAR - ADULT     Maintains optimal cardiac output and hemodynamic stability Progressing     Absence of cardiac dysrhythmias or at baseline rhythm Progressing        DISCHARGE PLANNING     Discharge to home or other facility with appropriate resources Progressing        GASTROINTESTINAL - ADULT     Minimal or absence of nausea and/or vomiting Progressing     Maintains or returns to baseline bowel function Progressing     Maintains adequate nutritional intake Progressing        GENITOURINARY - ADULT     Maintains or returns to baseline urinary function Progressing     Absence of urinary retention Progressing     Urinary catheter remains patent Progressing        HEMATOLOGIC - ADULT     Maintains hematologic stability Progressing        INFECTION - ADULT     Absence or prevention of progression during hospitalization Progressing        Knowledge Deficit     Patient/family/caregiver demonstrates understanding of disease process, treatment plan, medications, and discharge instructions Progressing        METABOLIC, FLUID AND ELECTROLYTES - ADULT     Electrolytes maintained within normal limits Progressing     Fluid balance maintained Progressing     Glucose maintained within target range Progressing        MUSCULOSKELETAL - ADULT     Maintain or return mobility to safest level of function Progressing     Maintain proper alignment of affected body part Progressing        NEUROSENSORY - ADULT     Achieves stable or improved neurological status Progressing     Absence of seizures Progressing     Remains free of injury related to seizures activity Progressing     Achieves maximal functionality and self care Progressing        Nutrition/Hydration-ADULT     Nutrient/Hydration intake appropriate for improving, restoring or maintaining nutritional needs Progressing        PAIN - ADULT     Verbalizes/displays adequate comfort level or baseline comfort level Progressing        Prexisting or High Potential for Compromised Skin Integrity     Skin integrity is maintained or improved Progressing        RESPIRATORY - ADULT     Achieves optimal ventilation and oxygenation Progressing        SAFETY ADULT     Patient will remain free of falls Progressing     Maintain or return to baseline ADL function Progressing     Maintain or return mobility status to optimal level Progressing        SAFETY,RESTRAINT: NV/NON-SELF DESTRUCTIVE BEHAVIOR     Remains free of harm/injury (restraint for non violent/non self-detsructive behavior) Progressing     Returns to optimal restraint-free functioning Progressing        SKIN/TISSUE INTEGRITY - ADULT     Skin integrity remains intact Progressing     Incision(s), wounds(s) or drain site(s) healing without S/S of infection Progressing     Oral mucous membranes remain intact Progressing

## 2018-12-20 NOTE — RESPIRATORY THERAPY NOTE
RT Ventilator Management Note  Britt Persaud 41 y o  female MRN: [id number]  [facility]: [id number]      Daily Screen       12/19/2018 1954 12/20/2018 0510          Patient safety screen outcome[de-identified] - Failed      Not Ready for Weaning due to[de-identified] Underline problem not resolved Underline problem not resolved      Spont breathing trial % for 30 min: - -      Spont breathing trial outcome[de-identified] - -      Spont breathing trial reason failed: - -              Physical Exam:   Assessment Type: Assess only  General Appearance: Sedated  Respiratory Pattern: Assisted  Chest Assessment: Chest expansion symmetrical  Bilateral Breath Sounds: Clear  Cough: None      Resp Comments: Pt remains on vent A/C 300 16 30% +5 Pt comfortable will continue to monitor (Pt unable to wean do to underlying condition)

## 2018-12-20 NOTE — RESPIRATORY THERAPY NOTE
RT Ventilator Management Note  Orlena Duane 41 y o  female MRN: [id number]  [facility] [id number]      Daily Screen       12/19/2018 0739 12/19/2018 1954          Patient safety screen outcome[de-identified] Passed -      Not Ready for Weaning due to[de-identified] - Underline problem not resolved      Spont breathing trial % for 30 min: - -      Spont breathing trial outcome[de-identified] - -      Spont breathing trial reason failed: - -              Physical Exam:   Assessment Type: (P) Assess only  General Appearance: (P) Sedated  Respiratory Pattern: (P) Assisted  Chest Assessment: (P) Chest expansion symmetrical  Bilateral Breath Sounds: (P) Clear      Resp Comments: (P) Pt remained on a/c settings throughout the night without incident

## 2018-12-20 NOTE — PROGRESS NOTES
Vancomycin Assessment    Vikash Griffin is a 41 y o  female who is currently receiving vancomycin 1500 mg Q12h for MRSA suspected, Pneumonia     Relevant clinical data and objective history reviewed:  Creatinine   Date Value Ref Range Status   12/19/2018 0 31 (L) 0 60 - 1 30 mg/dL Final     Comment:     Standardized to IDMS reference method   12/18/2018 0 39 (L) 0 60 - 1 30 mg/dL Final     Comment:     Standardized to IDMS reference method   12/17/2018 0 32 (L) 0 60 - 1 30 mg/dL Final     Comment:     Standardized to IDMS reference method   04/01/2015 0 36 (L) 0 60 - 1 30 mg/dL Final     Comment:     Standardized to IDMS reference method   02/02/2015 0 51 (L) 0 60 - 1 30 mg/dL Final     Comment:     Standardized to IDMS reference method   10/31/2014 0 50 (L) 0 60 - 1 30 mg/dL Final     Comment:     Standardized to IDMS reference method     Vancomycin Rm   Date Value Ref Range Status   10/17/2017 11 1 ug/mL Final     /79   Pulse (!) 120   Temp 98 5 °F (36 9 °C) (Oral)   Resp 16   Ht 5' (1 524 m)   Wt 60 kg (132 lb 4 4 oz)   SpO2 96%   BMI 25 83 kg/m²   No intake/output data recorded  Lab Results   Component Value Date/Time    BUN 5 12/19/2018 05:19 AM    BUN 7 04/01/2015 09:35 PM    WBC 18 37 (H) 12/19/2018 05:19 AM    WBC 13 78 (H) 04/01/2015 09:35 PM    HGB 9 1 (L) 12/19/2018 05:19 AM    HGB 12 7 04/01/2015 09:35 PM    HCT 27 9 (L) 12/19/2018 05:19 AM    HCT 39 2 04/01/2015 09:35 PM     (H) 12/19/2018 05:19 AM     (H) 04/01/2015 09:35 PM     12/19/2018 05:19 AM     04/01/2015 09:35 PM     Temp Readings from Last 3 Encounters:   12/19/18 98 5 °F (36 9 °C) (Oral)   12/19/18 98 °F (36 7 °C) (Tympanic)   10/13/18 98 °F (36 7 °C) (Temporal)     Vancomycin Days of Therapy: 6    Assessment/Plan  The patient is currently on vancomycin utilizing scheduled dosing  Patient is a transfer from [address] [place] [address] who is currently receiving 1500 mg Q12h   Pharmacy will follow closely for s/sx of nephrotoxicity, infusion reactions and appropriateness of therapy  BMP and CBC will be ordered per protocol  Plan for trough as patient approaches steady state, prior to the 4th  dose at approximately 12/20 @ 0700  Due to infection severity, will target a trough of 15-20 (appropriate for most indications)   Pharmacy will continue to follow the patients culture results and clinical progress daily      Cal Ibarra, Pharmacist

## 2018-12-20 NOTE — PROGRESS NOTES
Vancomycin Assessment    Donald Villalobos is a 41 y o  female who is currently receiving vancomycin pharmacy-to-dose for MRSA suspected, Pneumonia     Relevant clinical data and objective history reviewed:  Creatinine   Date Value Ref Range Status   12/20/2018 0 18 (L) 0 60 - 1 30 mg/dL Final     Comment:     Standardized to IDMS reference method   12/19/2018 0 31 (L) 0 60 - 1 30 mg/dL Final     Comment:     Standardized to IDMS reference method   12/18/2018 0 39 (L) 0 60 - 1 30 mg/dL Final     Comment:     Standardized to IDMS reference method   04/01/2015 0 36 (L) 0 60 - 1 30 mg/dL Final     Comment:     Standardized to IDMS reference method   02/02/2015 0 51 (L) 0 60 - 1 30 mg/dL Final     Comment:     Standardized to IDMS reference method   10/31/2014 0 50 (L) 0 60 - 1 30 mg/dL Final     Comment:     Standardized to IDMS reference method     Vancomycin Rm   Date Value Ref Range Status   10/17/2017 11 1 ug/mL Final     BP (!) 88/58 (BP Location: Right arm)   Pulse 88   Temp 99 5 °F (37 5 °C) (Rectal)   Resp 16   Ht 5' (1 524 m)   Wt 60 kg (132 lb 4 4 oz)   SpO2 98%   BMI 25 83 kg/m²   I/O last 3 completed shifts: In: 574 8 [I V :85 8; NG/GT:55; IV Piggyback:250; Feedings:184]  Out: 2400 [Urine:2400]  Lab Results   Component Value Date/Time    BUN 4 (L) 12/20/2018 05:10 AM    BUN 7 04/01/2015 09:35 PM    WBC 16 69 (H) 12/20/2018 05:10 AM    WBC 13 78 (H) 04/01/2015 09:35 PM    HGB 8 8 (L) 12/20/2018 05:10 AM    HGB 12 7 04/01/2015 09:35 PM    HCT 26 7 (L) 12/20/2018 05:10 AM    HCT 39 2 04/01/2015 09:35 PM     (H) 12/20/2018 05:10 AM     (H) 04/01/2015 09:35 PM     12/20/2018 05:10 AM     04/01/2015 09:35 PM     Temp Readings from Last 3 Encounters:   12/20/18 99 5 °F (37 5 °C) (Rectal)   12/19/18 98 °F (36 7 °C) (Tympanic)   10/13/18 98 °F (36 7 °C) (Temporal)     Vanco Assessment:  1  Indication/Cultures/Status: pneumonia  2  Renal function: SCr 0 18;  CrCl>100 mL/min; UOP 2L since admission last night  3  Days of therapy: 7  4  Current dose: 1500 mg IV q12h  5  Last level: 13 6 drawn at steady state and on time  6  Goal trough: 15-20    Vanco Plan:  1  New dosin mg IV q8h  2  Next level: plan for trough as patient approaches steady state prior to the 4th dose on  at approximately 9 [facility] will continue to follow closely for s/sx of nephrotoxicity, infusion reactions and appropriateness of therapy  BMP and CBC will be ordered per protocol  We will continue to follow the patients culture results and clinical progress daily      Katelyn Gonsales, PharmD, [address] [facility]  Critical Care Clinical Pharmacist

## 2018-12-20 NOTE — H&P
History and Physical - Critical Care   Michael Cabello 41 y o  female MRN: [id number]  Unit/Bed#: [facility] 05 Encounter: [id number]    Reason for Admission / Chief Complaint: fever, hypoxia, vomiting    History of Present Illness:  Michael Cabello is a 41 y o  female with PMH Lennox Gastaut syndrome and intellectual disability initially presented to the ED at [facility] on 12/14 with fever (103), hypoxia (spO2 85%), and vomiting  She was admitted for septic shock secondary to pneumonia, and started on broad spectrum antibiotics  Pt was intubated for acute hypoxic respiratory failure on 12/15  An EEG on 12/19 showed frequent recurrent electrographic seizures, 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  Seizures were not controlled by her extensive home antiepileptic regimen, or by propofol drip  She was switched to versed infusion and transferred to [facility] for continuous EEG monitoring  Pt is at her reported baseline  She makes some purposeful movements, but does not follow commands  Pt reportedly "looks up at the ceiling" during her seizures, but unable to determine if pt is seizing at this time  History obtained from chart review  Past Medical History:  Past Medical History:   Diagnosis Date    ADHD     Anoxic brain damage (HCC)     Autistic disorder     Hyperammonemia (HCC)     Hyperkeratosis     Hypotension     Intellectual disability     Intellectual disability     Lennox-Gastaut syndrome with tonic seizures (HCC)     Lethargy     Liver enzyme elevation     Onychomycosis     Osteoporosis     Osteoporosis     Psychiatric disorder     anxiety       Past Surgical History:  Past Surgical History:   Procedure Laterality Date    ABDOMINAL SURGERY      CARDIAC PACEMAKER PLACEMENT      IR THORACENTESIS  12/17/2018    JEJUNOSTOMY FEEDING TUBE      PEG TUBE PLACEMENT         Past Family History:  No family history on file      Social History:  History   Smoking Status    Never Smoker Smokeless Tobacco    Never Used     History   Alcohol Use No     History   Drug Use No     Marital Status: Single      Medications:  Current Facility-Administered Medications   Medication Dose Route Frequency    aztreonam (AZACTAM) 1,000 mg in sodium chloride 0 9 % 50 mL IVPB  1,000 mg Intravenous Q8H    chlorhexidine (PERIDEX) 0 12 % oral rinse 15 mL  15 mL Swish & Spit Q12H [address]    heparin (porcine) subcutaneous injection 5,000 Units  5,000 Units Subcutaneous Q8H [address]    insulin lispro (HumaLOG) 100 units/mL subcutaneous injection 1-5 Units  1-5 Units Subcutaneous Q6H [address]    levETIRAcetam (KEPPRA) 1,000 mg in sodium chloride 0 9 % 100 mL IVPB  1,000 mg Intravenous Q12H [address]    levOCARNitine (CARNITOR) oral solution 750 mg  750 mg Oral TID    metroNIDAZOLE (FLAGYL) IVPB (premix) 500 mg  500 mg Intravenous Q8H    midazolam (VERSED) 0 5 mg/mL (STANDARD CONCENTRATION) 100 mL infusion  2 mg/hr Intravenous Continuous    ondansetron (ZOFRAN) injection 4 mg  4 mg Intravenous Q4H PRN    pantoprazole (PROTONIX) injection 40 mg  40 mg Intravenous Q24H NANDO    PHENobarbital oral elixir 40 mg  40 mg Oral HS    rufinamide (BANZEL) tablet 1,200 mg  1,200 mg Oral BID With Meals    topiramate (TOPAMAX) tablet 200 mg  200 mg Oral BID    valproic acid (DEPAKENE) 250 MG/5ML oral soln 250 mg  250 mg Oral Q8H [address]    vancomycin (VANCOCIN) 1,500 mg in sodium chloride 0 9 % 250 mL IVPB  1,500 mg Intravenous Q12H     Home medications:  Prior to Admission medications    Medication Sig Start Date End Date Taking?  Authorizing Provider   bisacodyl (FLEET) 10 MG/30ML ENEM Insert 10 mg into the rectum as needed for constipation    Historical Provider, MD   chlorhexidine (HIBICLENS) 4 % external liquid Apply 1 application topically daily 10/13/18   Patrice Pretty, DO   enoxaparin (LOVENOX) 40 mg/0 4 mL Inject 0 4 mL (40 mg total) under the skin every 24 hours 10/13/18   Joelene Poll, DO   ibuprofen (MOTRIN) 200 mg tablet by Per G Tube route every 6 (six) hours as needed for mild pain    Historical Provider, MD   lactulose 20 g/30 mL 45 mL (30 g total) by Per G Tube route 3 (three) times a day 10/13/18   Philipp International, DO   levETIRAcetam (KEPPRA) 100 mg/mL oral solution 10 mL (1,000 mg total) by Per G Tube route every 12 (twelve) hours for 30 days 9/4/18 12/14/18  Nelson Quezada MD   levOCARNitine (CARNITOR) 1 g/10 mL solution 7 5 mL (750 mg total) by Per G Tube route 3 (three) times a day 9/4/18   Nelson Quezada MD   magnesium hydroxide (MILK OF MAGNESIA) 400 mg/5 mL oral suspension Take 30 mL by mouth daily as needed for constipation    Historical Provider, MD   MELATONIN PO 6 mg by Per G Tube route daily at bedtime    Historical Provider, MD   Multiple Vitamins-Minerals (MULTIVITAMIN WITH IRON-MINERALS) liquid 5 mL by Per G Tube route daily    Historical Provider, MD   ondansetron (ZOFRAN) 4 mg tablet 4 mg by Per G Tube route every 8 (eight) hours as needed for nausea or vomiting      Historical Provider, MD   perampanel [facility]) oral suspension 16 mL (8 mg total) by Per NG Tube route daily at bedtime Per G tube  Patient taking differently: 20 mg by Per NG Tube route daily at bedtime Per G tube  9/4/18   Nelson Quezada MD   PHENobarbital 20 mg/5 mL elixir Take 15 mL (60 mg total) by mouth daily at bedtime 9/4/18   Nelson Quezada MD   Probiotic Product (ALEXANDER-BID PROBIOTIC PO) 1 tablet by Per G Tube route daily      Historical Provider, MD   rufinamide (BANZEL) 400 mg tablet 3 tablets (1,200 mg total) by Per G Tube route 2 (two) times a day 9/4/18   Nelson Quezada MD   Sennosides-Docusate Sodium (SENEXON-S PO) 1 tablet by Per G Tube route daily      Historical Provider, MD   topiramate (TOPAMAX) 200 MG tablet Take 1 tablet (200 mg total) by mouth every 12 (twelve) hours 9/4/18   Nelson Quezada MD   valproic acid (DEPAKENE) 250 MG/5ML soln 5 mL (250 mg total) by Per G Tube route every 8 (eight) hours 9/4/18   Nelson Quezada, MD     Allergies: Allergies   Allergen Reactions    Felbamate        ROS:   Review of Systems   Unable to perform ROS: Intubated       Vitals:  Vitals:    18 2300 18 0000 18 0100 18 0200   BP: 108/68 91/64 93/54 94/66   Pulse: (!) 112 96 94 102   Resp: 15 15 15 16   Temp:   99 6 °F (37 6 °C)    TempSrc:   Oral    SpO2: 97% 99% 96% 97%   Weight:       Height:         Temperature:   Temp (24hrs), Av 9 °F (37 2 °C), Min:98 °F (36 7 °C), Max:99 6 °F (37 6 °C)    Current Temperature: 99 6 °F (37 6 °C)    Weights:   IBW: 45 5 kg  Body mass index is 25 83 kg/m²  Hemodynamic Monitoring:  N/A     Non-Invasive/Invasive Ventilation Settings:  Respiratory    Lab Data (Last 4 hours)    None         O2/Vent Data (Last 4 hours)       0106         Vent Mode AC/VC AC/VC      Resp Rate (BPM) (BPM) 14 16      Vt (mL) (mL) 400 300      FIO2 (%) (%) 30 30      PEEP (cmH2O) (cmH2O) 5 5      MV 8 5 5 2                No results found for: PHART, LZL8HEL, PO2ART, GKB5DPN, Q3DJXYCX, BEART, SOURCE  SpO2: SpO2: 97 %     Physical Exam:  Physical Exam   Constitutional: She appears well-developed and well-nourished  No distress  She is intubated  HENT:   Head: Normocephalic and atraumatic  Eyes: Pupils are equal, round, and reactive to light  Neck: Normal range of motion  No neck rigidity  Cardiovascular: Normal rate and regular rhythm  Exam reveals no gallop and no friction rub  No murmur heard  Pulmonary/Chest: She is intubated  She has no wheezes  She has no rales  Abdominal: Soft  Bowel sounds are normal  She exhibits no distension  There is no tenderness  Genitourinary:   Genitourinary Comments: nguyen   Musculoskeletal: She exhibits no edema or deformity  Neurological:   Moves all extremities spontaneously  Does not follow commands  Opens eyes spontaneously  Skin: She is not diaphoretic         Labs:    Results from last 7 days  Lab Units 18  7240 12/19/18  0519 12/18/18  0502 12/17/18  0528 12/16/18  0632 12/16/18  0205   WBC Thousand/uL  --  18 37* 14 51* 20 37* 22 32* 21 37*   HEMOGLOBIN g/dL  --  9 1* 9 3* 9 4* 9 8* 10 2*   HEMATOCRIT %  --  27 9* 28 2* 28 3* 29 8* 30 7*   PLATELETS Thousands/uL 218 176 222 224 208 225   NEUTROS PCT %  --   --   --  83* 87* 87*   MONOS PCT %  --   --   --  6 5 5   MONO PCT %  --  3* 5  --   --   --       Results from last 7 days  Lab Units 12/19/18  0519 12/18/18  0502 12/17/18  0528  12/14/18  1244   POTASSIUM mmol/L 3 2* 2 7* 2 8*  < >  --    CHLORIDE mmol/L 108 114* 117*  < >  --    CO2 mmol/L 27 21 21  < >  --    CO2, I-STAT mmol/L  --   --   --   --  19*   BUN mg/dL 5 8 10  < >  --    CREATININE mg/dL 0 31* 0 39* 0 32*  < >  --    CALCIUM mg/dL 7 3* 7 3* 7 3*  < >  --    ALK PHOS U/L 143* 149* 124*  < >  --    ALT U/L 74 68 55  < >  --    AST U/L 70* 76* 44  < >  --    GLUCOSE, ISTAT mg/dl  --   --   --   --  123   < > = values in this interval not displayed  Results from last 7 days  Lab Units 12/19/18  0519 12/18/18  0502 12/17/18  0528   MAGNESIUM mg/dL 1 9 1 9 1 8       Results from last 7 days  Lab Units 12/19/18  0519 12/18/18  0502 12/17/18  0528   PHOSPHORUS mg/dL 3 1 2 3* 1 7*        Results from last 7 days  Lab Units 12/18/18  0502 12/16/18  2228 12/15/18  0645 12/14/18  0659   INR  1 46* 1 44* 1 57* 1 37*   PTT seconds  --   --   --  34       Results from last 7 days  Lab Units 12/19/18  2326 12/19/18  1241 12/19/18  0519   LACTIC ACID mmol/L 2 0 2 4* 3 1*       0  Lab Value Date/Time   TROPONINI 0 02 12/17/2018 0231   TROPONINI <0 02 12/16/2018 2259   TROPONINI 0 02 12/16/2018 2029   TROPONINI 0 03 11/30/2017 2329   TROPONINI <0 02 10/10/2017 0423   TROPONINI <0 02 07/05/2017 2354   TROPONINI <0 02 02/22/2017 0044           Micro:  Lab Results   Component Value Date    BLOODCX No Growth After 5 Days  12/14/2018    BLOODCX No Growth After 5 Days  12/14/2018    BLOODCX No Growth After 5 Days   10/07/2018 URINECX No Growth <1000 cfu/mL 10/10/2018    SPUTUMCULTUR [place]  12/17/2018       ______________________________________________________________________    Assessment: 41 y o  Female with PMH Lennox Gaustat Syndrome and intellectual disability presenting with pneumonia and seizures, currently intubated and on a midazolam drip  Plan:     Neuro:   Intubated, sedated with versed drip  At baseline mental status  Lennox-Gastaut Syndrome with Tonic Seizures  -Continue anti-epileptics phenobarbitol, keppra, banzel, topamax, depakote  -f/u with pharmacy in the AM about perampenal (not on our formulary)  -continue versed drip   -start continuous EEG in the AM (epileptology aware)     CV:   Septic Shock secondary to pneumonia   -stable off pressors  -continue to monitor    Pulm: Intubated on mechanical ventilation  -ETT 19 at the lip on arrival, advanced 3cm after CXR    Pneumonia   -aspiration vs  HCAP vs  Gram negative  -continue IV aztreonam, IV vancomycin, and IV metronidazole (day 8, can consider d/c)   -f/u cultures  -WBC 18  -Procalcitonin    Left Pleural Effusion  -s/p thoracocentesis 12/17    GI:   -GI ppx: protonix    :   -Monitor I&Os    F/E/N:   Replete electrolytes as needed  Cont  Tube feeds    Hyperammonemia   -f/u morning ammonia    ID:   Pneumonia, see above    Heme:   1  DVT ppx: sq heparin, seds    Endo:   SSI     Msk/Skin:   -Frequent repositioning & off-loading to prevent skin breakdown   -PT/OT when appropriate    Disposition: MICU        ______________________________________________________________________    VTE Pharmacologic Prophylaxis: Heparin  VTE Mechanical Prophylaxis: sequential compression device    Invasive lines and devices: Invasive Devices     Central Venous Catheter Line            CVC Central Lines 12/15/18 Triple Right Internal jugular 4 days          Drain            Gastrostomy/Enterostomy Gastrostomy LUQ -- days    Gastrostomy/Enterostomy Gastrostomy 18 Fr   days    Urethral Catheter Latex;Straight-tip 16 Fr  4 days    Rectal Tube With balloon 3 days          Airway            ETT  Cuffed;Oral;Hi-Lo 6 5 mm 4 days          Nasogastric/Orogastric Tube            Feeding Tube 686 days                Code Status: Level 1 - Full Code  POA:    POLST:      Given critical illness, patient length of stay will require greater than two midnights  Portions of the record may have been created with voice recognition software  Occasional wrong word or "sound a like" substitutions may have occurred due to the inherent limitations of voice recognition software  Read the chart carefully and recognize, using context, where substitutions have occurred        Teofilo Clarke MD

## 2018-12-20 NOTE — PROGRESS NOTES
Progress Note - Critical Care   Vivian Ruvalcaba 41 y o  female MRN: [id number]  Unit/Bed#: [facility] 05 Encounter: [id number]    Assessment:   Principal Problem:    Pneumonia  Active Problems:    Lennox-Gastaut syndrome with tonic seizures (HCC)    Intellectual disability    MRSA colonization    Pleural effusion    Seizure disorder ([facility] [address] )    Status epilepticus ([facility] [address] )  Resolved Problems:    * No resolved hospital problems   *      Plan  Neuro:  Seizures   · Does not appears to be in pain at this time, will reassess   · Sedation plan: Continue midazolam drip to help with seizure control  · RASS goal: -2 Light Sedation  · Delirium Precautions  · CAM ICU per protocol  · Regulate sleep/wake cycle+  · Trend neuro exam  · Continuous EEG monitoring order placed   · Recent EEG monitoring 12/05 resulted 29 seizures over 24 hrs lasting 10 - 25 seconds  · Continue all epileptic home med's, keppra, phenobarbital, banzel and depakote will discuss with pharmacy alternative for home perampanel, as it is not available at this facility  · Repeat Depakote and phenobarbital level, last drawn 12/16 was not therapeutic  · Consider decreasing Calnitor as it may exacerbate seizures  · Neurology consulted     CV:   · MAP goal > 65  · Rhythm: NSR  · Follow rhythm on telemetry  · No cardiac issues at this time will continue to monitor closely    Lung: Multifocal pneumonia   · Continue mechanical ventilation, wean as able   ·  Chlorhexidine/HOB>30degrees ordered: yes  · Pulmonary toileting  · Paint had thoracentesis for a left sided pleural effusion at previous facility 12/17, appears to be exudative based on Light's criteria   · Cxr this AM appears to have slightly improved bilateral opacities   · Obtain baseline ABG    GI:   · Stress ulcer prophylaxis: Protonix IV will switch to PO Pepcid via PEG  · Bowel regimen: As per nursing, patient has pasty stools, will hold Stool softeners and laxatives at this time  · Elevated LFT's , likely related to multiple seizure medications, will continue to trend with morning labs    FEN:   · Goal 24 hour fluid balance: Euvolemia    Fluid/Diuretic plan: Patient is - 2 Liters since admission, hospital stay day # 2  · Nutrition/diet plan: Continue Jevity 1 2  · Replete electrolytes with goals: K >4 0, Mag >2 0, and Phos >3 0    :   · Indwelling Woods present: yes, will d/c this morning    · Trend UOP and BUN/creat  · Strict I and O  · Creatinine was 0 18 this morning     ID:   · Abx ordered: vancomycin d # 7 and metronidazole d # 7 as well as aztreonam d # 7  · Continue abx treatment today for a total of 7 days  · Trend temps and WBC count  · Leukocytosis is slowly resolving, afebrile overnight  · Blood cultures still pending   · Sputum culture is preliminarily resulted with mixed migue and rare gram positive cocci in pairs         Heme:   · Trend hgb and plts  · Transfuse as needed for goal hgb >7  · Hgb 8 8 this morning, will continue to trend with morning labs   · No signs of bleeding at this time, platelets WDL, will consider obtaining heme occult if Hgb continues to drop    Endo:   · Glycemic control plan: Blood glucose controlled on current regimen  · Q 6 hour glucose checks with SSI, maintain glucose <180    MSK/Skin:  · Mobility goal: Bedrest  · PT consult: No, will place order once patient is more stable   · OT consult: No  · Frequent turning and pressure off-loading  · Multi purpose boots     Lines:  · Central venous access: triple lumen catheter - 16 cm  · Will discontinue once peripheral access is obtained    VTE Prophylaxis:  · Pharmacologic Prophylaxis:Heparin  · Mechanical Prophylaxis: sequential compression device    Disposition: Continue ICU care      ______________________________________________________________________  Chief Complaint: Respiratory distress       HPI/24hr events: None reported      Review of Systems   Unable to perform ROS: Intubated ______________________________________________________________________  Temperature:   Temp (24hrs), Av 1 °F (37 3 °C), Min:98 °F (36 7 °C), Max:99 9 °F (37 7 °C)    Current Temperature: 99 5 °F (37 5 °C)    Vitals:    18 0800 18 0900 18 1000 18 1100   BP: 93/61 90/58 (!) 86/58 91/66   BP Location: Right arm Right arm     Pulse: 86 86 84 84   Resp: 15 16 20 18   Temp:       TempSrc:       SpO2: 98% 97% 99% 99%   Weight:       Height:                  Weights:   IBW: 45 5 kg    Body mass index is 25 83 kg/m²  Weight (last 2 days)     Date/Time   Weight    18 2216  60 (132 28)            Height: 5' (152 4 cm)  Hemodynamic Monitoring:  N/A     Noninvasive/Ventilator Settings:  Respiratory    Lab Data (Last 4 hours)    None         O2/Vent Data (Last 4 hours)    None              No results found for: PHART, HOB0XRI, PO2ART, PKW2FUG, N7MHEBRY, BEART, SOURCE  SpO2: SpO2: 99 %  ______________________________________________________________________  Physical Exam:  Mata Agitation Sedation Scale (RASS): Deep sedation  Physical Exam   Constitutional: She appears well-nourished  No distress  She is sedated and intubated  HENT:   Head: Normocephalic  Eyes: Pupils are equal, round, and reactive to light  Neck: No tracheal deviation present  Cardiovascular: Normal rate, regular rhythm and normal heart sounds  Pulses:       Radial pulses are 1+ on the right side, and 1+ on the left side  Dorsalis pedis pulses are 1+ on the right side, and 1+ on the left side  Pulmonary/Chest: She is intubated  No respiratory distress  She has decreased breath sounds in the right middle field, the right lower field, the left middle field and the left lower field  Abdominal: Soft  Bowel sounds are normal  She exhibits no ascites  Musculoskeletal:   +1 Pitting edema in b/l LE   Lymphadenopathy:     She has no cervical adenopathy  Neurological: GCS eye subscore is 2   GCS verbal subscore is 1  GCS motor subscore is 4  Skin: Skin is warm, dry and intact  No cyanosis  Nails show no clubbing      ______________________________________________________________________  Intake and Outputs:  I/O       12/18 0701 - 12/19 0700 12/19 0701 - 12/20 0700 12/20 0701 - 12/21 0700    I V  (mL/kg)  85 8 (1 4)     NG/GT  55     IV Piggyback  250     Feedings  184     Total Intake(mL/kg)  574 8 (9 6)     Urine (mL/kg/hr)  2400     Total Output   2400      Net   -1825 2             Unmeasured Stool Occurrence  1 x         Nutrition:        Diet Orders            Start     Ordered    12/20/18 0046  Diet Enteral/Parenteral; Tube Feeding No Oral Diet; Jevity 1 2 Avery; Continuous; 40  Diet effective now     Question Answer Comment   Diet Type Enteral/Parenteral    Enteral/Parenteral Tube Feeding No Oral Diet    Tube Feeding Formula: Jevity 1 2 Avery    Bolus/Cyclic/Continuous Continuous    Tube Feeding Goal Rate (mL/hr): 40    RD to adjust diet per protocol?  Yes        12/20/18 0045        Labs:     Results from last 7 days  Lab Units 12/20/18  0510 12/19/18  2326 12/19/18  0519 12/18/18  0502 12/17/18  0528 12/16/18  0632 12/16/18  0205   WBC Thousand/uL 16 69*  --  18 37* 14 51* 20 37* 22 32* 21 37*   HEMOGLOBIN g/dL 8 8*  --  9 1* 9 3* 9 4* 9 8* 10 2*   HEMATOCRIT % 26 7*  --  27 9* 28 2* 28 3* 29 8* 30 7*   PLATELETS Thousands/uL 180 218 176 222 224 208 225   NEUTROS PCT %  --   --   --   --  83* 87* 87*   MONOS PCT %  --   --   --   --  6 5 5   MONO PCT % 5  --  3* 5  --   --   --       Results from last 7 days  Lab Units 12/20/18  0510 12/19/18  0519 12/18/18  0502  12/14/18  1244   SODIUM mmol/L 142 143 146*  < >  --    POTASSIUM mmol/L 3 8 3 2* 2 7*  < >  --    CHLORIDE mmol/L 108 108 114*  < >  --    CO2 mmol/L 28 27 21  < >  --    CO2, I-STAT mmol/L  --   --   --   --  19*   BUN mg/dL 4* 5 8  < >  --    CREATININE mg/dL 0 18* 0 31* 0 39*  < >  --    CALCIUM mg/dL 7 8* 7 3* 7 3*  < >  --    ALK PHOS U/L 141* 143* 149*  < >  --    ALT U/L 66 74 68  < >  --    AST U/L 69* 70* 76*  < >  --    GLUCOSE, ISTAT mg/dl  --   --   --   --  123   < > = values in this interval not displayed  Results from last 7 days  Lab Units 12/20/18  0510 12/19/18  0519 12/18/18  0502   MAGNESIUM mg/dL 2 1 1 9 1 9       Results from last 7 days  Lab Units 12/20/18  0510 12/19/18  0519 12/18/18  0502   PHOSPHORUS mg/dL 3 2 3 1 2 3*        Results from last 7 days  Lab Units 12/18/18  0502 12/16/18  2228 12/15/18  0645 12/14/18  0659   INR  1 46* 1 44* 1 57* 1 37*   PTT seconds  --   --   --  34       Results from last 7 days  Lab Units 12/19/18  2326   LACTIC ACID mmol/L 2 0       0  Lab Value Date/Time   TROPONINI 0 02 12/17/2018 0231   TROPONINI <0 02 12/16/2018 2259   TROPONINI 0 02 12/16/2018 2029   TROPONINI 0 03 11/30/2017 2329   Augustus Sink <0 02 10/10/2017 0423   TROPONINI <0 02 07/05/2017 2354   TROPONINI <0 02 02/22/2017 0044     Imaging: Cxr - mildly improved b/l opacities I have personally reviewed pertinent reports  EKG: NSR  Micro:  Lab Results   Component Value Date    BLOODCX No Growth After 5 Days  12/14/2018    BLOODCX No Growth After 5 Days  12/14/2018    BLOODCX No Growth After 5 Days  10/07/2018    URINECX No Growth <1000 cfu/mL 10/10/2018    SPUTUMCULTUR Few Colonies of  12/17/2018     Allergies:    Allergies   Allergen Reactions    Felbamate      Medications:   Scheduled Meds:    Current Facility-Administered Medications:  aztreonam 1,000 mg Intravenous Q8H Radha Magana MD Last Rate: Stopped (12/20/18 0933)   chlorhexidine 15 mL Swish & Spit Q12H [facility] Kayla Denver, MD    famotidine 20 mg Oral BID Radha Magana MD    heparin (porcine) 5,000 Units Subcutaneous [facility] Kayla Denver, MD    insulin lispro 1-5 Units Subcutaneous Q6H [facility] Kayla Denver, MD    levETIRAcetam 1,000 mg Oral Q12H [facility] Radha Magana MD    levOCARNitine 750 mg Oral TID Kayla Denver, MD    metroNIDAZOLE 500 mg Intravenous Q8H [place] Tianna Frances MD Last Rate: Stopped (12/20/18 0744)   midazolam 4 mg/hr Intravenous Continuous Nichole Stuart MD Last Rate: 4 mg/hr (12/20/18 0315)   ondansetron 4 mg Intravenous Q4H PRN Nichole Stuart MD    Perampanel 10 mg Per NG Tube HS KATHY Ramirez    PHENobarbital 40 mg Oral HS Nichole Stuart MD    rufinamide 1,200 mg Oral BID With Meals Nichole Stuart MD    topiramate 200 mg Oral BID Nichole Stuart MD    valproic acid 250 mg Oral [facility] Nichole Stuart MD    vancomycin 15 mg/kg Intravenous Q8H Veronica Aguayo MD Last Rate: 1,000 mg (12/20/18 0934)     Continuous Infusions:    midazolam 4 mg/hr Last Rate: 4 mg/hr (12/20/18 0315)     PRN Meds:    ondansetron 4 mg Q4H PRN       Invasive lines and devices: Invasive Devices     Central Venous Catheter Line            CVC Central Lines 12/15/18 Triple Right Internal jugular 4 days          Drain            Gastrostomy/Enterostomy Gastrostomy LUQ -- days    Gastrostomy/Enterostomy Gastrostomy 18 Fr   days    Urethral Catheter Latex;Straight-tip 16 Fr  5 days          Airway            ETT  Cuffed;Oral;Hi-Lo 6 5 mm 5 days          Nasogastric/Orogastric Tube            Feeding Tube 687 days                   Counseling / Coordination of Care  Total Critical Care time spent 36 minutes excluding procedures, teaching and family updates  Code Status: Level 1 - Full Code    Portions of the record may have been created with voice recognition software  Occasional wrong word or "sound a like" substitutions may have occurred due to the inherent limitations of voice recognition software  Read the chart carefully and recognize, using context, where substitutions have occurred      KATHY Ramirez

## 2018-12-21 LAB
ANION GAP SERPL CALCULATED.3IONS-SCNC: 7 MMOL/L (ref 4–13)
ATRIAL RATE: 96 BPM
BACTERIA SPT RESP CULT: ABNORMAL
BACTERIA SPT RESP CULT: ABNORMAL
BUN SERPL-MCNC: 5 MG/DL (ref 5–25)
CALCIUM SERPL-MCNC: 8.4 MG/DL (ref 8.3–10.1)
CHLORIDE SERPL-SCNC: 106 MMOL/L (ref 100–108)
CO2 SERPL-SCNC: 28 MMOL/L (ref 21–32)
CREAT SERPL-MCNC: 0.17 MG/DL (ref 0.6–1.3)
ERYTHROCYTE [DISTWIDTH] IN BLOOD BY AUTOMATED COUNT: 15.1 % (ref 11.6–15.1)
GFR SERPL CREATININE-BSD FRML MDRD: 177 ML/MIN/1.73SQ M
GLUCOSE SERPL-MCNC: 111 MG/DL (ref 65–140)
GLUCOSE SERPL-MCNC: 127 MG/DL (ref 65–140)
GLUCOSE SERPL-MCNC: 127 MG/DL (ref 65–140)
GLUCOSE SERPL-MCNC: 161 MG/DL (ref 65–140)
GLUCOSE SERPL-MCNC: 177 MG/DL (ref 65–140)
GRAM STN SPEC: ABNORMAL
GRAM STN SPEC: ABNORMAL
HCT VFR BLD AUTO: 29.3 % (ref 34.8–46.1)
HGB BLD-MCNC: 9.4 G/DL (ref 11.5–15.4)
MCH RBC QN AUTO: 33.5 PG (ref 26.8–34.3)
MCHC RBC AUTO-ENTMCNC: 32.1 G/DL (ref 31.4–37.4)
MCV RBC AUTO: 104 FL (ref 82–98)
P AXIS: 45 DEGREES
PLATELET # BLD AUTO: 220 THOUSANDS/UL (ref 149–390)
PMV BLD AUTO: 10.9 FL (ref 8.9–12.7)
POTASSIUM SERPL-SCNC: 3.6 MMOL/L (ref 3.5–5.3)
PR INTERVAL: 118 MS
QRS AXIS: 68 DEGREES
QRSD INTERVAL: 60 MS
QT INTERVAL: 286 MS
QTC INTERVAL: 361 MS
RBC # BLD AUTO: 2.81 MILLION/UL (ref 3.81–5.12)
SODIUM SERPL-SCNC: 141 MMOL/L (ref 136–145)
T WAVE AXIS: 13 DEGREES
VENTRICULAR RATE: 96 BPM
WBC # BLD AUTO: 14.31 THOUSAND/UL (ref 4.31–10.16)

## 2018-12-21 PROCEDURE — 94760 N-INVAS EAR/PLS OXIMETRY 1: CPT

## 2018-12-21 PROCEDURE — 85027 COMPLETE CBC AUTOMATED: CPT | Performed by: NURSE PRACTITIONER

## 2018-12-21 PROCEDURE — 82948 REAGENT STRIP/BLOOD GLUCOSE: CPT

## 2018-12-21 PROCEDURE — 80048 BASIC METABOLIC PNL TOTAL CA: CPT | Performed by: NURSE PRACTITIONER

## 2018-12-21 PROCEDURE — 99233 SBSQ HOSP IP/OBS HIGH 50: CPT | Performed by: INTERNAL MEDICINE

## 2018-12-21 PROCEDURE — 99232 SBSQ HOSP IP/OBS MODERATE 35: CPT | Performed by: PSYCHIATRY & NEUROLOGY

## 2018-12-21 PROCEDURE — 93010 ELECTROCARDIOGRAM REPORT: CPT | Performed by: INTERNAL MEDICINE

## 2018-12-21 RX ORDER — AMOXICILLIN 250 MG
1 CAPSULE ORAL DAILY
Status: DISCONTINUED | OUTPATIENT
Start: 2018-12-22 | End: 2018-12-24 | Stop reason: HOSPADM

## 2018-12-21 RX ORDER — LANOLIN ALCOHOL/MO/W.PET/CERES
6 CREAM (GRAM) TOPICAL
Status: DISCONTINUED | OUTPATIENT
Start: 2018-12-21 | End: 2018-12-24 | Stop reason: HOSPADM

## 2018-12-21 RX ORDER — POTASSIUM CHLORIDE 20MEQ/15ML
40 LIQUID (ML) ORAL ONCE
Status: COMPLETED | OUTPATIENT
Start: 2018-12-21 | End: 2018-12-21

## 2018-12-21 RX ORDER — POTASSIUM CHLORIDE 29.8 MG/ML
40 INJECTION INTRAVENOUS ONCE
Status: DISCONTINUED | OUTPATIENT
Start: 2018-12-21 | End: 2018-12-21

## 2018-12-21 RX ORDER — MULTIVIT WITH IRON,MINERALS
5 LIQUID (ML) ORAL DAILY
Status: DISCONTINUED | OUTPATIENT
Start: 2018-12-22 | End: 2018-12-24 | Stop reason: HOSPADM

## 2018-12-21 RX ADMIN — FAMOTIDINE 20 MG: 40 POWDER, FOR SUSPENSION ORAL at 08:29

## 2018-12-21 RX ADMIN — LEVOCARNITINE 750 MG: 1 SOLUTION ORAL at 08:37

## 2018-12-21 RX ADMIN — RUFINAMIDE 1200 MG: 200 TABLET, FILM COATED ORAL at 16:34

## 2018-12-21 RX ADMIN — TOPIRAMATE 200 MG: 100 TABLET, FILM COATED ORAL at 18:14

## 2018-12-21 RX ADMIN — ENOXAPARIN SODIUM 40 MG: 40 INJECTION SUBCUTANEOUS at 10:01

## 2018-12-21 RX ADMIN — VANCOMYCIN HYDROCHLORIDE 1000 MG: 1 INJECTION, SOLUTION INTRAVENOUS at 00:25

## 2018-12-21 RX ADMIN — LEVOCARNITINE 750 MG: 1 SOLUTION ORAL at 16:34

## 2018-12-21 RX ADMIN — VALPROIC ACID 250 MG: 500 SOLUTION ORAL at 22:53

## 2018-12-21 RX ADMIN — LEVETIRACETAM 1000 MG: 100 SOLUTION ORAL at 08:30

## 2018-12-21 RX ADMIN — LEVOCARNITINE 750 MG: 1 SOLUTION ORAL at 22:52

## 2018-12-21 RX ADMIN — LEVETIRACETAM 1000 MG: 100 SOLUTION ORAL at 22:52

## 2018-12-21 RX ADMIN — RUFINAMIDE 1200 MG: 200 TABLET, FILM COATED ORAL at 08:30

## 2018-12-21 RX ADMIN — INSULIN LISPRO 1 UNITS: 100 INJECTION, SOLUTION INTRAVENOUS; SUBCUTANEOUS at 13:20

## 2018-12-21 RX ADMIN — HEPARIN SODIUM 5000 UNITS: 5000 INJECTION INTRAVENOUS; SUBCUTANEOUS at 05:05

## 2018-12-21 RX ADMIN — TOPIRAMATE 200 MG: 100 TABLET, FILM COATED ORAL at 08:31

## 2018-12-21 RX ADMIN — VALPROIC ACID 250 MG: 500 SOLUTION ORAL at 13:41

## 2018-12-21 RX ADMIN — PERAMPANEL 10 MG: 0.5 SUSPENSION ORAL at 13:40

## 2018-12-21 RX ADMIN — CHLORHEXIDINE GLUCONATE 0.12% ORAL RINSE 15 ML: 1.2 LIQUID ORAL at 08:29

## 2018-12-21 RX ADMIN — POTASSIUM CHLORIDE 40 MEQ: 20 SOLUTION ORAL at 10:00

## 2018-12-21 RX ADMIN — VALPROIC ACID 250 MG: 500 SOLUTION ORAL at 05:05

## 2018-12-21 RX ADMIN — PHENOBARBITAL 40 MG: 20 LIQUID ORAL at 22:52

## 2018-12-21 RX ADMIN — MELATONIN 6 MG: at 22:52

## 2018-12-21 NOTE — PLAN OF CARE
CARDIOVASCULAR - ADULT     Maintains optimal cardiac output and hemodynamic stability Progressing     Absence of cardiac dysrhythmias or at baseline rhythm Progressing        DISCHARGE PLANNING     Discharge to home or other facility with appropriate resources Progressing        GASTROINTESTINAL - ADULT     Minimal or absence of nausea and/or vomiting Progressing     Maintains or returns to baseline bowel function Progressing     Maintains adequate nutritional intake Progressing        GENITOURINARY - ADULT     Maintains or returns to baseline urinary function Progressing     Absence of urinary retention Progressing     Urinary catheter remains patent Progressing        HEMATOLOGIC - ADULT     Maintains hematologic stability Progressing        INFECTION - ADULT     Absence or prevention of progression during hospitalization Progressing        Knowledge Deficit     Patient/family/caregiver demonstrates understanding of disease process, treatment plan, medications, and discharge instructions Progressing        METABOLIC, FLUID AND ELECTROLYTES - ADULT     Electrolytes maintained within normal limits Progressing     Fluid balance maintained Progressing     Glucose maintained within target range Progressing        MUSCULOSKELETAL - ADULT     Maintain or return mobility to safest level of function Progressing     Maintain proper alignment of affected body part Progressing        NEUROSENSORY - ADULT     Achieves stable or improved neurological status Progressing     Absence of seizures Progressing     Remains free of injury related to seizures activity Progressing     Achieves maximal functionality and self care Progressing        Nutrition/Hydration-ADULT     Nutrient/Hydration intake appropriate for improving, restoring or maintaining nutritional needs Progressing        PAIN - ADULT     Verbalizes/displays adequate comfort level or baseline comfort level Progressing        Potential for Falls  Patient will remain free of falls Progressing        Prexisting or High Potential for Compromised Skin Integrity     Skin integrity is maintained or improved Progressing        RESPIRATORY - ADULT     Achieves optimal ventilation and oxygenation Progressing        SAFETY ADULT     Patient will remain free of falls Progressing     Maintain or return to baseline ADL function Progressing     Maintain or return mobility status to optimal level Progressing        SAFETY,RESTRAINT: NV/NON-SELF DESTRUCTIVE BEHAVIOR     Remains free of harm/injury (restraint for non violent/non self-detsructive behavior) Progressing     Returns to optimal restraint-free functioning Progressing        SKIN/TISSUE INTEGRITY - ADULT     Skin integrity remains intact Progressing     Incision(s), wounds(s) or drain site(s) healing without S/S of infection Progressing     Oral mucous membranes remain intact Progressing

## 2018-12-21 NOTE — RESPIRATORY THERAPY NOTE
RT Ventilator Management Note  Donald Villalobos 41 y o  female MRN: [id number]  Unit/Bed#: [facility] 05 Encounter: [id number]      Daily Screen       12/19/2018 1954 12/20/2018 0510          Patient safety screen outcome[de-identified] - Failed      Not Ready for Weaning due to[de-identified] Underline problem not resolved Underline problem not resolved      Spont breathing trial % for 30 min: - -      Spont breathing trial outcome[de-identified] - -      Spont breathing trial reason failed: - -              Physical Exam:   Assessment Type: Assess only  General Appearance: Sedated  Respiratory Pattern: Assisted  Chest Assessment: Chest expansion symmetrical  Bilateral Breath Sounds: Diminished, Coarse      Resp Comments: Called for self extubation  Pt  placed on nasal cannula at 4l/m  No resp  distress noted at this time

## 2018-12-21 NOTE — PROGRESS NOTES
Progress Note - ICU Transfer to SD/MS tele/MS   Argenis Lucero 41 y o  female MRN: [id number]  [address] [facility]   Unit/Bed#: [facility] 05 Encounter: [id number]    Code Status: Level 1 - Full Code  POA:    Referring Physician:   Accepting Physician:  _____________________________________________________________________    Reason for ICU/SD admission: Respiratory failure     History of Present Illness: As per Dr Garett Regalado from [facility] "Argenis Lucero is a 41 y o  female who presents with pulse ox of 85% on room air and fever to 103° from nursing home  ER records reviewed, EMS reported several episodes of vomiting, patient had a fever 103°, heart rate of 135 patient shaking well vomiting pulse ox was noted to be 5%, patient nonverbal at baseline  The patient was initially seen at [facility] intubated and admitted to the ICU on 12/14/2018  She was transferred to [place] ICU on 12/19/2018, as per DR Washington "41 y o  female with PMH Lennox Gastaut syndrome and intellectual disability initially presented to the ED at [facility] on 12/14 with fever (103), hypoxia (spO2 85%), and vomiting  She was admitted for septic shock secondary to pneumonia, and started on broad spectrum antibiotics  Pt was intubated for acute hypoxic respiratory failure on 12/15  An EEG on 12/19 showed frequent recurrent electrographic seizures, 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  Seizures were not controlled by her extensive home antiepileptic regimen, or by propofol drip  She was switched to versed infusion and transferred to [facility] for continuous EEG monitoring      Pt is at her reported baseline  She makes some purposeful movements, but does not follow commands  Pt reportedly "looks up at the ceiling" during her seizures, but unable to determine if pt is seizing at this time  "        Summary of clinical course:  The patient was started on abx therapy at [facility] for MRSA pneumonia, she had a left pleural effusion which was drained at the previous campus  She completed her 7 day therapy of vanco, flagyl and azactam The patient was evaluated by neurology, had 24 hour EEG monitoring last performed December 5th, 2018 which showed a total of 29 seizures lasting 15-25 seconds, as per previous records has been determined to have baseline seizuresof approximately 10 per 24 hrs, especially while asleep  A decision was made to not proceed with 24 hour EEG monitoring as patient has been at baseline  Her home medication regimen was continued, she was extubated 12/21/2018 and continued to do well  Patient has a PEG tube receiving continuous Tube Feeds  She was restarted on her home dose of Lovenox SQ for DVT prophylaxis  Consultants: Neurology  _____________________________________________________________________    Diagnostic Data:  CBC:    Results from last 7 days  Lab Units 12/21/18  0503   WBC Thousand/uL 14 31*   HEMOGLOBIN g/dL 9 4*   HEMATOCRIT % 29 3*   PLATELETS Thousands/uL 220      CMP:   Lab Results   Component Value Date    SODIUM 141 12/21/2018    K 3 6 12/21/2018     12/21/2018    CO2 28 12/21/2018    BUN 5 12/21/2018    CREATININE 0 17 (L) 12/21/2018    CALCIUM 8 4 12/21/2018    EGFR 177 12/21/2018   ,   PT/INR: No results found for: PT, INR,   Magnesium: No components found for: MAG,  Phosphorous: No results found for: PHOS    ABG: No results found for: PHART, HJQ1IRQ, PO2ART, IHY6ODU, O4JFUNMT, BEART, SOURCE,     Microbiology:    Results from last 7 days  Lab Units 12/19/18  1242 12/19/18  1241 12/18/18  0941 12/17/18  1607 12/17/18  1430 12/17/18  1138   BLOOD CULTURE  No Growth at 48 hrs   No Growth at 48 hrs   --   --   --   --    SPUTUM CULTURE   --   --   --   --   --  1 colony Methicillin Resistant Staphylococcus aureus*  Few Colonies of    GRAM STAIN RESULT   --   --   --  2+ Polys  No bacteria seen 3+ Polys  No bacteria seen 3+ Polys  Rare Gram positive cocci in pairs   BODY FLUID CULTURE, STERILE   --   --   --   --  No growth  --    MRSA CULTURE ONLY   --   --  Methicillin Resistant Staphylococcus aureus isolated*  Please note: This patient requires contact precautions  --   --   --        Imaging: I have personally reviewed the pertinent imaging studies on the PACS system  Cxr - Stable perihilar and basilar opacities with suspected small effusions    Cardiac/EKG/telemetry/Echo:     Results from last 7 days  Lab Units 12/19/18  0519 12/17/18  0231 12/16/18  2259 12/16/18 2029   CK TOTAL U/L 25*  --   --   --    TROPONIN I ng/mL  --  0 02 <0 02 0 02       _____________________________________________________________________    Recent or scheduled procedures: None    Outstanding/pending diagnostics: None     Mobilization Plan: OOB as tolerated, patient does not ambulate at baseline  Home medications that are not reordered and reason why: All pertinent meds restarted    Spoke with Dr Maxi Tena regarding transfer  Please call [phone] with any questions or concerns  Portions of the record may have been created with voice recognition software  Occasional wrong word or "sound a like" substitutions may have occurred due to the inherent limitations of voice recognition software  Read the chart carefully and recognize, using context, where substitutions have occurred      KATHY Grover

## 2018-12-21 NOTE — PROGRESS NOTES
Progress Note - Critical Care   Dara Roa 41 y o  female MRN: [id number]  Unit/Bed#: MICU 05 Encounter: [id number]    Assessment:   Principal Problem:    Pneumonia  Active Problems:    Lennox-Gastaut syndrome with tonic seizures (HCC)    Intellectual disability    MRSA colonization    Pleural effusion    Seizure disorder ([facility] [address] )    Status epilepticus ([facility] [address] )  Resolved Problems:    * No resolved hospital problems   *      Plan  Neuro:   · No pain reported  · Has been weaned off versed   · Trend neuro exam  · Continue all home seizure medications Keppra, Phenobarbital, Banzel, Topamax, Valproic acid and Perampanel  · Appreciate Neurology consult, treat as per recommendations     CV:   · No Hemodynamic infusions  · MAP goal > 65  · Rhythm: NSR  · Follow rhythm on telemetry  · No other cardiac issues     Lung:   · Daily SBT assessment in coordination with SAT per protocol: Wean and plan to extubate this morning   · HOB>30degrees ordered: yes  · Pulmonary toileting  · Maintain o2 saturation > 92% via NC    GI:   · Discontinue Pepcid  · Bowel regimen: senna  · PEG present LUQ    FEN:   · Goal 24 hour fluid balance: Euvolemia   · Nutrition/diet plan: Hold tube feeds for extubation restart TF via PEG if extubated, Jevity 1 2 at 40 ml/hr  · Replete electrolytes with goals: K >4 0, Mag >2 0, and Phos >3 0    :   · Indwelling Woods present: No  · Trend UOP and BUN/creat  · Strict I and O  · Creatinine - 0 17 this morning     ID:   · Completed 7 days of Abx therapy - vanco, flagyl and aztreonam  · WBC is improving, 14 this morning   · Trend temps and WBC count  · Blood cultures - negative after 24 hrs   · Sputum culture- + MRSA    Heme:   · Trend hgb and plts  · Transfuse as needed for goal hgb >7 0  · Hgb improved this morning 9 4    Endo:   · Glycemic control plan: Blood glucose controlled on current regimen  · Continue SSI, q 6 hours glucose checks, maintain glucose <180    MSK/Skin:  · Mobility goal: OOB as tolerated  · PT consult: yes  · Frequent turning and pressure off-loading    VTE Prophylaxis:  · Pharmacologic Prophylaxis:Enoxaparin (Lovenox)  · Mechanical Prophylaxis: sequential compression device    Disposition: Transfer to telemetry      ______________________________________________________________________  Chief Complaint: Respiratory failure and seizures      HPI/24hr events: None, this morning upon rounds patient was extubated, placed on nasal canula, oxygenating well, no respiratory distress  Review of Systems   Unable to perform ROS: Patient nonverbal     ______________________________________________________________________  Temperature:   Temp (24hrs), Av 5 °F (36 9 °C), Min:98 °F (36 7 °C), Max:99 °F (37 2 °C)    Current Temperature: 99 °F (37 2 °C)    Vitals:    18 0533 18 0620 18 0821 18 0830   BP:  112/82 109/70    Pulse:  (!) 110 100    Resp:  (!) 26 20    Temp:   99 °F (37 2 °C)    TempSrc:   Oral    SpO2: 98% 97% 96% 96%   Weight:       Height:                  Weights:   IBW: 45 5 kg    Body mass index is 23 34 kg/m²  Weight (last 2 days)     Date/Time   Weight    18 0415  54 2 (119 49)    18 2216  60 (132 28)            Height: 5' (152 4 cm)  Hemodynamic Monitoring:  N/A     Noninvasive/Ventilator Settings:  Respiratory    Lab Data (Last 4 hours)    None         O2/Vent Data (Last 4 hours)    None              Lab Results   Component Value Date    PHART 7 445 2018    WOR1RDP 41 4 2018    PO2ART 83 0 2018    UID1PWU 27 8 2018    BEART 3 4 2018    SOURCE Radial, Left 2018     SpO2: SpO2: 96 %  ______________________________________________________________________  Physical Exam:  Mata Agitation Sedation Scale (RASS): Light sedation  Physical Exam   Constitutional: Nasal cannula in place  HENT:   Head: Normocephalic  Eyes: Pupils are equal, round, and reactive to light     Neck: No tracheal tenderness present  No tracheal deviation present  No thyromegaly present  Cardiovascular: Regular rhythm and normal heart sounds  Pulses:       Radial pulses are 2+ on the right side, and 2+ on the left side  Dorsalis pedis pulses are 2+ on the right side, and 2+ on the left side  Pulmonary/Chest: No accessory muscle usage  No respiratory distress  She has decreased breath sounds in the right lower field and the left lower field  She has wheezes in the right upper field and the left upper field  Abdominal: Soft  Normal appearance and bowel sounds are normal    Musculoskeletal:   No clubbing or cyanosis noted  Lymphadenopathy:     She has no cervical adenopathy  Neurological: She is alert  GCS eye subscore is 4  GCS verbal subscore is 1  GCS motor subscore is 4  Skin:          ______________________________________________________________________  Intake and Outputs:  I/O       12/19 0701 - 12/20 0700 12/20 0701 - 12/21 0700 12/21 0701 - 12/22 0700    I V  (mL/kg) 85 8 (1 4) 252 8 (4 7)     NG/GT 55 135     IV Piggyback 250 800     Feedings 184 862     Total Intake(mL/kg) 574 8 (9 6) 2049 8 (37 8)     Urine (mL/kg/hr) 2400 5744 (4 4)     Total Output 2400 5744      Net -1825 2 -3694 2             Unmeasured Urine Occurrence  2 x     Unmeasured Stool Occurrence 1 x 5 x          Nutrition:        Diet Orders            Start     Ordered    12/20/18 0046  Diet Enteral/Parenteral; Tube Feeding No Oral Diet; Jevity 1 2 Avery; Continuous; 40  Diet effective now     Question Answer Comment   Diet Type Enteral/Parenteral    Enteral/Parenteral Tube Feeding No Oral Diet    Tube Feeding Formula: Jevity 1 2 Avery    Bolus/Cyclic/Continuous Continuous    Tube Feeding Goal Rate (mL/hr): 40    RD to adjust diet per protocol?  Yes        12/20/18 0045        Labs:     Results from last 7 days  Lab Units 12/21/18  0503 12/20/18  0510 12/19/18  2326 12/19/18  2056 12/18/18  0502 12/17/18  9868 12/16/18  5557 12/16/18  0205 WBC Thousand/uL 14 31* 16 69*  --  18 37* 14 51* 20 37* 22 32* 21 37*   HEMOGLOBIN g/dL 9 4* 8 8*  --  9 1* 9 3* 9 4* 9 8* 10 2*   HEMATOCRIT % 29 3* 26 7*  --  27 9* 28 2* 28 3* 29 8* 30 7*   PLATELETS Thousands/uL 220 180 218 176 222 224 208 225   NEUTROS PCT %  --   --   --   --   --  83* 87* 87*   MONOS PCT %  --   --   --   --   --  6 5 5   MONO PCT %  --  5  --  3* 5  --   --   --       Results from last 7 days  Lab Units 12/21/18  0503 12/20/18  0510 12/19/18  0519 12/18/18  0502  12/14/18  1244   SODIUM mmol/L 141 142 143 146*  < >  --    POTASSIUM mmol/L 3 6 3 8 3 2* 2 7*  < >  --    CHLORIDE mmol/L 106 108 108 114*  < >  --    CO2 mmol/L 28 28 27 21  < >  --    CO2, I-STAT mmol/L  --   --   --   --   --  19*   BUN mg/dL 5 4* 5 8  < >  --    CREATININE mg/dL 0 17* 0 18* 0 31* 0 39*  < >  --    CALCIUM mg/dL 8 4 7 8* 7 3* 7 3*  < >  --    ALK PHOS U/L  --  141* 143* 149*  < >  --    ALT U/L  --  66 74 68  < >  --    AST U/L  --  69* 70* 76*  < >  --    GLUCOSE, ISTAT mg/dl  --   --   --   --   --  123   < > = values in this interval not displayed      Results from last 7 days  Lab Units 12/20/18  0510 12/19/18  0519 12/18/18  0502   MAGNESIUM mg/dL 2 1 1 9 1 9       Results from last 7 days  Lab Units 12/20/18  0510 12/19/18  0519 12/18/18  0502   PHOSPHORUS mg/dL 3 2 3 1 2 3*        Results from last 7 days  Lab Units 12/18/18  0502 12/16/18  2228 12/15/18  0645   INR  1 46* 1 44* 1 57*       Results from last 7 days  Lab Units 12/19/18  2326   LACTIC ACID mmol/L 2 0       0  Lab Value Date/Time   TROPONINI 0 02 12/17/2018 0231   TROPONINI <0 02 12/16/2018 2259   TROPONINI 0 02 12/16/2018 2029   TROPONINI 0 03 11/30/2017 2329   TROPONINI <0 02 10/10/2017 0423   TROPONINI <0 02 07/05/2017 2354   TROPONINI <0 02 02/22/2017 0044     Imaging:  No new imaging today  EKG: NSR  Micro:  Lab Results   Component Value Date    BLOODCX No Growth at 24 hrs  12/19/2018    BLOODCX No Growth at 24 hrs  12/19/2018 BLOODCX No Growth After 5 Days  12/14/2018    URINECX No Growth <1000 cfu/mL 10/10/2018    SPUTUMCULTUR (A) 12/17/2018     1 colony Methicillin Resistant Staphylococcus aureus    SPUTUMCULTUR Few Colonies of  12/17/2018     Allergies: Allergies   Allergen Reactions    Felbamate      Medications:   Scheduled Meds:    Current Facility-Administered Medications:  chlorhexidine 15 mL Swish & Spit Q12H [address] Kyaw Drew MD   enoxaparin 40 mg Subcutaneous Q24H Kelly Fay MD   insulin lispro 1-5 Units Subcutaneous Q6H Irasema Huynh MD   levETIRAcetam 1,000 mg Oral Q12H Kelly Fay MD   levOCARNitine 750 mg Oral TID Kyaw Drew MD   ondansetron 4 mg Intravenous Q4H PRN Kyaw Drew MD   perampanel 10 mg Per OG Tube Daily Alison Taylor MD   PHENobarbital 40 mg Oral HS Kyaw Drew MD   potassium chloride 40 mEq Oral Once Rita Bah MD   rufinamide 1,200 mg Oral BID With Meals Kyaw Drew MD   topiramate 200 mg Oral BID Kyaw Drew MD   valproic acid 250 mg Oral Q8H [address] Kyaw Drew MD     Continuous Infusions:   PRN Meds:    ondansetron 4 mg Q4H PRN       Invasive lines and devices: Invasive Devices     Peripheral Intravenous Line            Peripheral IV 12/20/18 Left Antecubital less than 1 day    Peripheral IV 12/20/18 Right Antecubital less than 1 day          Drain            Gastrostomy/Enterostomy Gastrostomy LUQ -- days    Gastrostomy/Enterostomy Gastrostomy 18 Fr   days    External Urinary Catheter less than 1 day          Airway            ETT  Cuffed;Oral;Hi-Lo 6 5 mm 6 days          Nasogastric/Orogastric Tube            Feeding Tube 688 days                   Counseling / Coordination of Care  Total Critical Care time spent 30 minutes excluding procedures, teaching and family updates  Code Status: Level 1 - Full Code    Portions of the record may have been created with voice recognition software    Occasional wrong word or "sound a like" substitutions may have occurred due to the inherent limitations of voice recognition software  Read the chart carefully and recognize, using context, where substitutions have occurred      Sourav KATHY Agudelo

## 2018-12-21 NOTE — PLAN OF CARE
CARDIOVASCULAR - ADULT     Maintains optimal cardiac output and hemodynamic stability Progressing     Absence of cardiac dysrhythmias or at baseline rhythm Progressing        DISCHARGE PLANNING     Discharge to home or other facility with appropriate resources Progressing        DISCHARGE PLANNING - CARE MANAGEMENT     Discharge to post-acute care or home with appropriate resources Progressing        GASTROINTESTINAL - ADULT     Minimal or absence of nausea and/or vomiting Progressing     Maintains or returns to baseline bowel function Progressing     Maintains adequate nutritional intake Progressing        GENITOURINARY - ADULT     Maintains or returns to baseline urinary function Progressing     Absence of urinary retention Progressing     Urinary catheter remains patent Progressing        HEMATOLOGIC - ADULT     Maintains hematologic stability Progressing        INFECTION - ADULT     Absence or prevention of progression during hospitalization Progressing        Knowledge Deficit     Patient/family/caregiver demonstrates understanding of disease process, treatment plan, medications, and discharge instructions Progressing        METABOLIC, FLUID AND ELECTROLYTES - ADULT     Electrolytes maintained within normal limits Progressing     Fluid balance maintained Progressing     Glucose maintained within target range Progressing        MUSCULOSKELETAL - ADULT     Maintain or return mobility to safest level of function Progressing     Maintain proper alignment of affected body part Progressing        NEUROSENSORY - ADULT     Achieves stable or improved neurological status Progressing     Absence of seizures Progressing     Remains free of injury related to seizures activity Progressing     Achieves maximal functionality and self care Progressing        Nutrition/Hydration-ADULT     Nutrient/Hydration intake appropriate for improving, restoring or maintaining nutritional needs Progressing PAIN - ADULT     Verbalizes/displays adequate comfort level or baseline comfort level Progressing        Potential for Falls     Patient will remain free of falls Progressing        Prexisting or High Potential for Compromised Skin Integrity     Skin integrity is maintained or improved Progressing        RESPIRATORY - ADULT     Achieves optimal ventilation and oxygenation Progressing        SAFETY ADULT     Patient will remain free of falls Progressing     Maintain or return to baseline ADL function Progressing     Maintain or return mobility status to optimal level Progressing        SAFETY,RESTRAINT: NV/NON-SELF DESTRUCTIVE BEHAVIOR     Remains free of harm/injury (restraint for non violent/non self-detsructive behavior) Progressing     Returns to optimal restraint-free functioning Progressing        SKIN/TISSUE INTEGRITY - ADULT     Skin integrity remains intact Progressing     Incision(s), wounds(s) or drain site(s) healing without S/S of infection Progressing     Oral mucous membranes remain intact Progressing          SAFETY,RESTRAINT: NV/NON-SELF DESTRUCTIVE BEHAVIOR     Remains free of harm/injury (restraint for non violent/non self-detsructive behavior) Progressing     Returns to optimal restraint-free functioning Progressing          SAFETY ADULT     Patient will remain free of falls Progressing     Maintain or return to baseline ADL function Progressing     Maintain or return mobility status to optimal level Progressing          RESPIRATORY - ADULT     Achieves optimal ventilation and oxygenation Progressing          Prexisting or High Potential for Compromised Skin Integrity     Skin integrity is maintained or improved Progressing

## 2018-12-21 NOTE — CONSULTS
Vancomycin has been discontinued by the primary team  Thank you for this consult, pharmacy will sign off now      Katelyn Gonsales, PharmD, [address] [facility]  Critical Care Clinical Pharmacist

## 2018-12-21 NOTE — SOCIAL WORK
CM reviewed pt's chart  Pt was form [facility] for LTC  CM called and spoke with Bandar Cordero who informed CM that pt is a bedhold at their facility  Pt has 7/15 days  CM also spoke with pt's RN Jasiel Merritt who informd CM that pt was alert and baseline confused  Pt was a total assist with ADL's, transfer via chelita lift and WC bound  Pt has PEG and on continuous TF and NPO at the SNF   Pt was dcd form 1:1 last Nov of this year  CM called pt's mother Tae Sue, introduced self and made aware of CM role  Per Tae Sue, DCP for pt is for her to return to [facility] when medically clear for dc, referral in [facility]  CM will follow  CM reviewed d/c planning process including the following: identifying help at home, patient preference for d/c planning needs, Discharge Lounge, [facility] Meds to Bed program, availability of treatment team to discuss questions or concerns patient and/or family may have regarding understanding medications and recognizing signs and symptoms once discharged  CM also encouraged patient to follow up with all recommended appointments after discharge  Patient advised of importance for patient and family to participate in managing patients medical well being

## 2018-12-21 NOTE — PROGRESS NOTES
Progress Note - Neurology   Trevin Ayala 41 y o  female [id number]  Unit/Bed#: MICU 05/MICU 05    Assessment:  Trevin Ayala is a 41 y o  female with a past medical history of Lennox-Gastaut syndrome, intellectual disability, anoxic brain injury who was being treated for septic shock secondary to pneumonia and was transferred to [facility] for possibility of vEEG monitoring  Patient has been weaned off Versed  Nonverbal at baseline  As patient's baseline is frequent seizures despite medications and VNS, as long as patient continues to be somewhat interactive, no need for video EEG monitoring  On arrival to patient's room, she did exhibit signs of a seizure with an upward gaze and limited response on exam but returned to baseline immediately after  Plan:  - Continue Keppra 1000 mg BID, Phenobarbital 40 mg QHS, Banzel 1200 BID, Topamax 200 BID, Valproic acid 250 mg Q8, Fycompa 10 mg  VNS in place    - Continue medical management per primary team   - Please notify neurology if patient appears to be unresponsive or if there are any changes in exam    - Further recommendations per attending neurologist        Subjective:   No acute events overnight  Patient's WBC have been trending down since 12/19 (18 37 to 16 69 to 14 31 today ) Patient extubated this morning  Per nursing, she has been showing clinical evidence of seizures, mostly identified by upward gaze  The nurse reports that she returns to her baseline immediately after  There have been no episodes where the patient showed evidence of unresponsiveness  Per the nurse, she has been doing well         Past Medical History:   Diagnosis Date    ADHD     Anoxic brain damage (HCC)     Autistic disorder     Hyperammonemia (HCC)     Hyperkeratosis     Hypotension     Intellectual disability     Intellectual disability     Lennox-Gastaut syndrome with tonic seizures (HCC)     Lethargy     Liver enzyme elevation     Onychomycosis     Osteoporosis     Osteoporosis     Psychiatric disorder     anxiety     Past Surgical History:   Procedure Laterality Date    ABDOMINAL SURGERY      CARDIAC PACEMAKER PLACEMENT      IR THORACENTESIS  12/17/2018    JEJUNOSTOMY FEEDING TUBE      PEG TUBE PLACEMENT       No family history on file  Social History     Social History    Marital status: Single     Spouse name: N/A    Number of children: N/A    Years of education: N/A     Social History Main Topics    Smoking status: Never Smoker    Smokeless tobacco: Never Used    Alcohol use No    Drug use: No    Sexual activity: No     Other Topics Concern    Not on file     Social History Narrative    No narrative on file         Medications: All current active meds have been reviewed and current meds:  Scheduled Meds:  Current Facility-Administered Medications:  chlorhexidine 15 mL Swish & Spit Q12H [address] Robbie Nichole MD   enoxaparin 40 mg Subcutaneous Q24H [address] René Guillen MD   insulin lispro 1-5 Units Subcutaneous Q6H [address] Robbie Nichole MD   levETIRAcetam 1,000 mg Oral Q12H Lluvia Fitzgerald MD   levOCARNitine 750 mg Oral TID Robbie Nichole MD   ondansetron 4 mg Intravenous Q4H PRN Robbie Nichole MD   perampanel 10 mg Per OG Tube Daily Elkin Toro MD   PHENobarbital 40 mg Oral HS Robbie Nichole MD   potassium chloride 40 mEq Oral Once René Guillen MD   rufinamide 1,200 mg Oral BID With Meals Robbie Nichole MD   topiramate 200 mg Oral BID Robbie Nichole MD   valproic acid 250 mg Oral Q8H [address] Robbie Nichole MD     Continuous Infusions:   PRN Meds: ondansetron       ROS:   Review of Systems   Unable to perform ROS: Patient nonverbal             Vitals:   /70   Pulse 100   Temp 99 °F (37 2 °C) (Oral)   Resp 20   Ht 5' (1 524 m)   Wt 54 2 kg (119 lb 7 8 oz)   SpO2 96%   BMI 23 34 kg/m²     Physical Exam:   Physical Exam   Constitutional: No distress     Upward gaze and decreased responses on arrival  Immediately returned to baseline after  HENT:   Head: Normocephalic and atraumatic  Right Ear: External ear normal    Left Ear: External ear normal    Nose: Nose normal    Eyes: Conjunctivae are normal  Right eye exhibits no discharge  Left eye exhibits no discharge  No scleral icterus  Pupils equally round, sluggish to react to light  Dysconjugate gaze but able to cross midline  Cardiovascular: Normal rate and regular rhythm  Pulmonary/Chest: Effort normal  No stridor  No respiratory distress  Musculoskeletal: She exhibits no edema  Neurological: She is alert  Skin: Skin is warm and dry  No rash noted  She is not diaphoretic  No erythema  Neurologic Exam     Mental Status   Patient is awake, likely seizing on arrival but immediately returned to baseline  Could not completely assess mental status as patient is nonverbal      Cranial Nerves   Unable to assess visual acuity  Pupils equally round and sluggish response to light  Upward gaze present on arrival, but eventually patient was able to move eyes spontaneously in all directions  Will look to side where someone is calling her name or clapping  Dysconjugate gaze seen, but patient appeared to correct at times  Hearing appears intact  Unable to assess other CN  Motor Exam Moving bilateral arms spontaneously  No evidence of movement of feet  Seemed to raise right arm on command  Formal strength testing could not be obtained  Sensory Exam   Unable to formally assess  Gait, Coordination, and Reflexes Unable to assess gait  Labs: I have personally reviewed pertinent reports     Recent Results (from the past 24 hour(s))   Fingerstick Glucose (POCT)    Collection Time: 12/20/18 11:57 AM   Result Value Ref Range    POC Glucose 124 65 - 140 mg/dl   Blood gas, arterial    Collection Time: 12/20/18  1:33 PM   Result Value Ref Range    pH, Arterial 7 445 7 350 - 7 450    pCO2, Arterial 41 4 36 0 - 44 0 mm Hg    pO2, Arterial 83 0 75 0 - 129 0 mm Hg    HCO3, Arterial 27 8 22 0 - 28 0 mmol/L    Base Excess, Arterial 3 4 mmol/L    O2 Content, Arterial 14 5 (L) 16 0 - 23 0 mL/dL    O2 HGB,Arterial  95 9 94 0 - 97 0 %    SOURCE Radial, Left     REZA TEST No     Vent Type- AC AC     AC Rate 16     Tidal Volume 300 ml    Inspired Air (FIO2) 30     PEEP 5    Fingerstick Glucose (POCT)    Collection Time: 12/20/18  6:23 PM   Result Value Ref Range    POC Glucose 140 65 - 140 mg/dl   Fingerstick Glucose (POCT)    Collection Time: 12/20/18 11:33 PM   Result Value Ref Range    POC Glucose 144 (H) 65 - 140 mg/dl   CBC and Platelet    Collection Time: 12/21/18  5:03 AM   Result Value Ref Range    WBC 14 31 (H) 4 31 - 10 16 Thousand/uL    RBC 2 81 (L) 3 81 - 5 12 Million/uL    Hemoglobin 9 4 (L) 11 5 - 15 4 g/dL    Hematocrit 29 3 (L) 34 8 - 46 1 %     (H) 82 - 98 fL    MCH 33 5 26 8 - 34 3 pg    MCHC 32 1 31 4 - 37 4 g/dL    RDW 15 1 11 6 - 15 1 %    Platelets 264 464 - 567 Thousands/uL    MPV 10 9 8 9 - 12 7 fL   Basic metabolic panel    Collection Time: 12/21/18  5:03 AM   Result Value Ref Range    Sodium 141 136 - 145 mmol/L    Potassium 3 6 3 5 - 5 3 mmol/L    Chloride 106 100 - 108 mmol/L    CO2 28 21 - 32 mmol/L    ANION GAP 7 4 - 13 mmol/L    BUN 5 5 - 25 mg/dL    Creatinine 0 17 (L) 0 60 - 1 30 mg/dL    Glucose 127 65 - 140 mg/dL    Calcium 8 4 8 3 - 10 1 mg/dL    eGFR 177 ml/min/1 73sq m   Fingerstick Glucose (POCT)    Collection Time: 12/21/18  5:37 AM   Result Value Ref Range    POC Glucose 127 65 - 140 mg/dl       Imaging: I have personally reviewed pertinent imaging and I have personally reviewed PACS reports  EKG, Pathology, and Other Studies: I have personally reviewed pertinent reports  VTE Prophylaxis: Sequential compression device (Venodyne)  and Enoxaparin (Lovenox)        Total time spent today 30 minutes  Greater than 50% of total time was spent with the patient and / or family counseling and / or coordination of care   A description of the counseling / coordination of care: Reviewed case, discussed with nursing staff, discussed with attending neurologist

## 2018-12-21 NOTE — RESPIRATORY THERAPY NOTE
RT Ventilator Management Note  Vivian Ruvalcaba 41 y o  female MRN: [id number]  Unit/Bed#: MICU 05 Encounter: [id number]      Daily Screen       12/19/2018 1954 12/20/2018 0510          Patient safety screen outcome[de-identified] - Failed      Not Ready for Weaning due to[de-identified] Underline problem not resolved Underline problem not resolved      Spont breathing trial % for 30 min: - -      Spont breathing trial outcome[de-identified] - -      Spont breathing trial reason failed: - -              Physical Exam:   Assessment Type: (P) Assess only  General Appearance: (P) Sedated  Respiratory Pattern: (P) Assisted  Chest Assessment: (P) Chest expansion symmetrical  Bilateral Breath Sounds: (P) Diminished, Coarse      Resp Comments: (P) Pt remained on a/c settings throughout the night without incident

## 2018-12-22 LAB
ANION GAP SERPL CALCULATED.3IONS-SCNC: 8 MMOL/L (ref 4–13)
BUN SERPL-MCNC: 6 MG/DL (ref 5–25)
CALCIUM SERPL-MCNC: 8.4 MG/DL (ref 8.3–10.1)
CHLORIDE SERPL-SCNC: 105 MMOL/L (ref 100–108)
CO2 SERPL-SCNC: 25 MMOL/L (ref 21–32)
CREAT SERPL-MCNC: 0.22 MG/DL (ref 0.6–1.3)
ERYTHROCYTE [DISTWIDTH] IN BLOOD BY AUTOMATED COUNT: 15.4 % (ref 11.6–15.1)
GFR SERPL CREATININE-BSD FRML MDRD: 163 ML/MIN/1.73SQ M
GLUCOSE SERPL-MCNC: 108 MG/DL (ref 65–140)
GLUCOSE SERPL-MCNC: 119 MG/DL (ref 65–140)
GLUCOSE SERPL-MCNC: 120 MG/DL (ref 65–140)
GLUCOSE SERPL-MCNC: 134 MG/DL (ref 65–140)
HCT VFR BLD AUTO: 30.2 % (ref 34.8–46.1)
HGB BLD-MCNC: 9.5 G/DL (ref 11.5–15.4)
MCH RBC QN AUTO: 33.7 PG (ref 26.8–34.3)
MCHC RBC AUTO-ENTMCNC: 31.5 G/DL (ref 31.4–37.4)
MCV RBC AUTO: 107 FL (ref 82–98)
PLATELET # BLD AUTO: 276 THOUSANDS/UL (ref 149–390)
PMV BLD AUTO: 11.5 FL (ref 8.9–12.7)
POTASSIUM SERPL-SCNC: 4 MMOL/L (ref 3.5–5.3)
RBC # BLD AUTO: 2.82 MILLION/UL (ref 3.81–5.12)
SODIUM SERPL-SCNC: 138 MMOL/L (ref 136–145)
WBC # BLD AUTO: 15.22 THOUSAND/UL (ref 4.31–10.16)

## 2018-12-22 PROCEDURE — 82948 REAGENT STRIP/BLOOD GLUCOSE: CPT

## 2018-12-22 PROCEDURE — 85027 COMPLETE CBC AUTOMATED: CPT | Performed by: STUDENT IN AN ORGANIZED HEALTH CARE EDUCATION/TRAINING PROGRAM

## 2018-12-22 PROCEDURE — 80048 BASIC METABOLIC PNL TOTAL CA: CPT | Performed by: STUDENT IN AN ORGANIZED HEALTH CARE EDUCATION/TRAINING PROGRAM

## 2018-12-22 PROCEDURE — 99232 SBSQ HOSP IP/OBS MODERATE 35: CPT | Performed by: FAMILY MEDICINE

## 2018-12-22 RX ADMIN — SENNOSIDES AND DOCUSATE SODIUM 1 TABLET: 8.6; 5 TABLET ORAL at 08:44

## 2018-12-22 RX ADMIN — VALPROIC ACID 250 MG: 500 SOLUTION ORAL at 05:14

## 2018-12-22 RX ADMIN — RUFINAMIDE 1200 MG: 200 TABLET, FILM COATED ORAL at 08:34

## 2018-12-22 RX ADMIN — LEVOCARNITINE 750 MG: 1 SOLUTION ORAL at 21:36

## 2018-12-22 RX ADMIN — VALPROIC ACID 250 MG: 500 SOLUTION ORAL at 21:34

## 2018-12-22 RX ADMIN — RUFINAMIDE 1200 MG: 200 TABLET, FILM COATED ORAL at 17:23

## 2018-12-22 RX ADMIN — TOPIRAMATE 200 MG: 100 TABLET, FILM COATED ORAL at 08:36

## 2018-12-22 RX ADMIN — INSULIN LISPRO 1 UNITS: 100 INJECTION, SOLUTION INTRAVENOUS; SUBCUTANEOUS at 00:56

## 2018-12-22 RX ADMIN — ENOXAPARIN SODIUM 40 MG: 40 INJECTION SUBCUTANEOUS at 08:34

## 2018-12-22 RX ADMIN — LEVETIRACETAM 1000 MG: 100 SOLUTION ORAL at 21:36

## 2018-12-22 RX ADMIN — MELATONIN 6 MG: at 21:34

## 2018-12-22 RX ADMIN — PERAMPANEL 10 MG: 0.5 SUSPENSION ORAL at 14:56

## 2018-12-22 RX ADMIN — LEVETIRACETAM 1000 MG: 100 SOLUTION ORAL at 08:43

## 2018-12-22 RX ADMIN — VALPROIC ACID 250 MG: 500 SOLUTION ORAL at 13:58

## 2018-12-22 RX ADMIN — PHENOBARBITAL 40 MG: 20 LIQUID ORAL at 21:39

## 2018-12-22 RX ADMIN — MULTIVITAMIN 5 ML: LIQUID ORAL at 08:44

## 2018-12-22 RX ADMIN — LEVOCARNITINE 750 MG: 1 SOLUTION ORAL at 08:43

## 2018-12-22 RX ADMIN — LEVOCARNITINE 750 MG: 1 SOLUTION ORAL at 17:24

## 2018-12-22 RX ADMIN — TOPIRAMATE 200 MG: 100 TABLET, FILM COATED ORAL at 17:24

## 2018-12-22 NOTE — PLAN OF CARE
CARDIOVASCULAR - ADULT     Maintains optimal cardiac output and hemodynamic stability Progressing     Absence of cardiac dysrhythmias or at baseline rhythm Progressing        DISCHARGE PLANNING     Discharge to home or other facility with appropriate resources Progressing        DISCHARGE PLANNING - CARE MANAGEMENT     Discharge to post-acute care or home with appropriate resources Progressing        GASTROINTESTINAL - ADULT     Minimal or absence of nausea and/or vomiting Progressing     Maintains or returns to baseline bowel function Progressing     Maintains adequate nutritional intake Progressing        GENITOURINARY - ADULT     Maintains or returns to baseline urinary function Progressing     Absence of urinary retention Progressing     Urinary catheter remains patent Progressing        HEMATOLOGIC - ADULT     Maintains hematologic stability Progressing        INFECTION - ADULT     Absence or prevention of progression during hospitalization Progressing        Knowledge Deficit     Patient/family/caregiver demonstrates understanding of disease process, treatment plan, medications, and discharge instructions Progressing        METABOLIC, FLUID AND ELECTROLYTES - ADULT     Electrolytes maintained within normal limits Progressing     Fluid balance maintained Progressing     Glucose maintained within target range Progressing        MUSCULOSKELETAL - ADULT     Maintain or return mobility to safest level of function Progressing     Maintain proper alignment of affected body part Progressing        NEUROSENSORY - ADULT     Achieves stable or improved neurological status Progressing     Absence of seizures Progressing     Remains free of injury related to seizures activity Progressing     Achieves maximal functionality and self care Progressing        Nutrition/Hydration-ADULT     Nutrient/Hydration intake appropriate for improving, restoring or maintaining nutritional needs Progressing PAIN - ADULT     Verbalizes/displays adequate comfort level or baseline comfort level Progressing        Potential for Falls     Patient will remain free of falls Progressing        Prexisting or High Potential for Compromised Skin Integrity     Skin integrity is maintained or improved Progressing        RESPIRATORY - ADULT     Achieves optimal ventilation and oxygenation Progressing        SAFETY ADULT     Patient will remain free of falls Progressing     Maintain or return to baseline ADL function Progressing     Maintain or return mobility status to optimal level Progressing        SAFETY,RESTRAINT: NV/NON-SELF DESTRUCTIVE BEHAVIOR     Remains free of harm/injury (restraint for non violent/non self-detsructive behavior) Progressing     Returns to optimal restraint-free functioning Progressing        SKIN/TISSUE INTEGRITY - ADULT     Skin integrity remains intact Progressing     Incision(s), wounds(s) or drain site(s) healing without S/S of infection Progressing     Oral mucous membranes remain intact Progressing

## 2018-12-22 NOTE — ASSESSMENT & PLAN NOTE
· Neurology evaluation appreciated  · Continue with the current antiepileptic medication  · Intractable seizures

## 2018-12-22 NOTE — PROGRESS NOTES
Progress Note - Bassam Storm 1980, 41 y o  female MRN: [id number]    Unit/Bed#: [facility] 718-01 Encounter: [id number]    Primary Care Provider: Kwame St DO   Date and time admitted to hospital: 2018 10:02 PM        Seizure disorder [facility])   Assessment & Plan    · Neurology evaluation appreciated  · Continue with the current antiepileptic     Intellectual disability   Assessment & Plan    · At baseline     Lennox-Gastaut syndrome with tonic seizures ([address] )   Assessment & Plan    · Neurology evaluation appreciated  · Continue with the current antiepileptic medication  · Intractable seizures      * Pneumonia   Assessment & Plan    · Admitted to the intensive care unit  · Continue with the antibiotics  · Follow-up on the cultures         VTE Pharmacologic Prophylaxis:   Pharmacologic: Enoxaparin (Lovenox)  Mechanical VTE Prophylaxis in Place: Yes    Patient Centered Rounds: I have performed bedside rounds with nursing staff today  Discussions with Specialists or Other Care Team Provider:     Education and Discussions with Family / Patient:     Time Spent for Care: 30 minutes  More than 50% of total time spent on counseling and coordination of care as described above  Current Length of Stay: 3 day(s)    Current Patient Status: Inpatient   Certification Statement: The patient will continue to require additional inpatient hospital stay due to Antibiotics for now    Discharge Plan:     Code Status: Level 1 - Full Code      Subjective:   Patient seen and examined  No events overnight  Nonverbal at baseline    Objective:     Vitals:   Temp (24hrs), Av 3 °F (36 8 °C), Min:98 °F (36 7 °C), Max:98 6 °F (37 °C)    Temp:  [98 °F (36 7 °C)-98 6 °F (37 °C)] 98 °F (36 7 °C)  HR:  [89-96] 89  Resp:  [18-20] 18  BP: ()/(62-81) 98/72  SpO2:  [95 %-98 %] 96 %  Body mass index is 23 34 kg/m²  Input and Output Summary (last 24 hours):        Intake/Output Summary (Last 24 hours) at 18 1843  Last data filed at 12/22/18 1730   Gross per 24 hour   Intake              480 ml   Output              996 ml   Net             -516 ml       Physical Exam:     Physical Exam   Constitutional: She appears well-developed  HENT:   Head: Normocephalic  Eyes: Pupils are equal, round, and reactive to light  EOM are normal    Neck: Normal range of motion  Cardiovascular: Normal rate  Pulmonary/Chest: Effort normal  No respiratory distress  Abdominal: Soft  She exhibits no distension  Musculoskeletal: Normal range of motion  She exhibits no edema  Neurological: She is alert  Patient is trying to move my hand away during the examination   Skin: Skin is warm  Additional Data:     Labs:      Results from last 7 days  Lab Units 12/22/18  0529 12/20/18  0510  12/17/18  0528   WBC Thousand/uL 15 22*  < > 16 69*  < > 20 37*   HEMOGLOBIN g/dL 9 5*  < > 8 8*  < > 9 4*   HEMATOCRIT % 30 2*  < > 26 7*  < > 28 3*   PLATELETS Thousands/uL 276  < > 180  < > 224   NEUTROS PCT %  --   --   --   --  83*   LYMPHS PCT %  --   --   --   --  9*   LYMPHO PCT %  --   --  17  < >  --    MONOS PCT %  --   --   --   --  6   MONO PCT %  --   --  5  < >  --    EOS PCT %  --   --  1  < > 0   < > = values in this interval not displayed  Results from last 7 days  Lab Units 12/22/18  0529 12/20/18  0510   POTASSIUM mmol/L 4 0  < > 3 8   CHLORIDE mmol/L 105  < > 108   CO2 mmol/L 25  < > 28   BUN mg/dL 6  < > 4*   CREATININE mg/dL 0 22*  < > 0 18*   CALCIUM mg/dL 8 4  < > 7 8*   ALK PHOS U/L  --   --  141*   ALT U/L  --   --  66   AST U/L  --   --  69*   < > = values in this interval not displayed  Results from last 7 days  Lab Units 12/18/18  0502   INR  1 46*       * I Have Reviewed All Lab Data Listed Above  * Additional Pertinent Lab Tests Reviewed:  All Labs For Current Hospital Admission Reviewed    Imaging:    Imaging Reports Reviewed Today Include:   Imaging Personally Reviewed by Myself Includes:      Recent Cultures (last 7 days):       Results from last 7 days  Lab Units 12/19/18  1242 12/19/18  1241 12/17/18  1607 12/17/18  1430 12/17/18  1138   BLOOD CULTURE  No Growth at 72 hrs  No Growth at 72 hrs   --   --   --    SPUTUM CULTURE   --   --   --   --  1 colony Methicillin Resistant Staphylococcus aureus*  Few Colonies of    GRAM STAIN RESULT   --   --  2+ Polys  No bacteria seen 3+ Polys  No bacteria seen 3+ Polys  Rare Gram positive cocci in pairs   BODY FLUID CULTURE, STERILE   --   --   --  No growth  --        Last 24 Hours Medication List:     Current Facility-Administered Medications:  enoxaparin 40 mg Subcutaneous Q24H [address] Wilbur Fuentes MD   insulin lispro 1-5 Units Subcutaneous Q6H [address] Kath Dejesus MD   levETIRAcetam 1,000 mg Oral Q12H Isaias Pham MD   levOCARNitine 750 mg Oral TID Kath Dejesus MD   melatonin 6 mg Per G Tube HS KATHY Zacarias   multivitamin with iron-minerals 5 mL Per G Tube Daily KATHY Etienne   ondansetron 4 mg Intravenous Q4H PRN Kath Dejesus MD   perampanel 10 mg Per OG Tube Daily Isabela Romero MD   PHENobarbital 40 mg Oral HS Kath Dejesus MD   rufinamide 1,200 mg Oral BID With Meals Kath Dejesus MD   senna-docusate sodium 1 tablet Per G Tube Daily KATHY Zacarias   topiramate 200 mg Oral BID Kath Dejesus MD   valproic acid 250 mg Oral [facility] Kath Dejesus MD        Today, Patient Was Seen By: Yang Thompson MD    ** Please Note: Dictation voice to text software may have been used in the creation of this document   **

## 2018-12-23 LAB
ALBUMIN SERPL BCP-MCNC: 2.2 G/DL (ref 3.5–5)
ALP SERPL-CCNC: 166 U/L (ref 46–116)
ALT SERPL W P-5'-P-CCNC: 81 U/L (ref 12–78)
ANION GAP SERPL CALCULATED.3IONS-SCNC: 9 MMOL/L (ref 4–13)
AST SERPL W P-5'-P-CCNC: 89 U/L (ref 5–45)
BASOPHILS # BLD AUTO: 0.09 THOUSANDS/ΜL (ref 0–0.1)
BASOPHILS # BLD AUTO: 0.1 THOUSANDS/ΜL (ref 0–0.1)
BASOPHILS NFR BLD AUTO: 1 % (ref 0–1)
BASOPHILS NFR BLD AUTO: 1 % (ref 0–1)
BILIRUB SERPL-MCNC: 0.47 MG/DL (ref 0.2–1)
BUN SERPL-MCNC: 6 MG/DL (ref 5–25)
CALCIUM SERPL-MCNC: 8.4 MG/DL (ref 8.3–10.1)
CHLORIDE SERPL-SCNC: 107 MMOL/L (ref 100–108)
CO2 SERPL-SCNC: 23 MMOL/L (ref 21–32)
CREAT SERPL-MCNC: 0.28 MG/DL (ref 0.6–1.3)
EOSINOPHIL # BLD AUTO: 0.16 THOUSAND/ΜL (ref 0–0.61)
EOSINOPHIL # BLD AUTO: 0.2 THOUSAND/ΜL (ref 0–0.61)
EOSINOPHIL NFR BLD AUTO: 1 % (ref 0–6)
EOSINOPHIL NFR BLD AUTO: 1 % (ref 0–6)
ERYTHROCYTE [DISTWIDTH] IN BLOOD BY AUTOMATED COUNT: 16 % (ref 11.6–15.1)
ERYTHROCYTE [DISTWIDTH] IN BLOOD BY AUTOMATED COUNT: 16.2 % (ref 11.6–15.1)
GFR SERPL CREATININE-BSD FRML MDRD: 150 ML/MIN/1.73SQ M
GLUCOSE SERPL-MCNC: 126 MG/DL (ref 65–140)
GLUCOSE SERPL-MCNC: 141 MG/DL (ref 65–140)
GLUCOSE SERPL-MCNC: 141 MG/DL (ref 65–140)
GLUCOSE SERPL-MCNC: 145 MG/DL (ref 65–140)
GLUCOSE SERPL-MCNC: 161 MG/DL (ref 65–140)
GLUCOSE SERPL-MCNC: 169 MG/DL (ref 65–140)
HCT VFR BLD AUTO: 33.4 % (ref 34.8–46.1)
HCT VFR BLD AUTO: 36.7 % (ref 34.8–46.1)
HGB BLD-MCNC: 10.8 G/DL (ref 11.5–15.4)
HGB BLD-MCNC: 11.9 G/DL (ref 11.5–15.4)
IMM GRANULOCYTES # BLD AUTO: 0.35 THOUSAND/UL (ref 0–0.2)
IMM GRANULOCYTES # BLD AUTO: 0.38 THOUSAND/UL (ref 0–0.2)
IMM GRANULOCYTES NFR BLD AUTO: 2 % (ref 0–2)
IMM GRANULOCYTES NFR BLD AUTO: 2 % (ref 0–2)
LYMPHOCYTES # BLD AUTO: 3.18 THOUSANDS/ΜL (ref 0.6–4.47)
LYMPHOCYTES # BLD AUTO: 4.06 THOUSANDS/ΜL (ref 0.6–4.47)
LYMPHOCYTES NFR BLD AUTO: 18 % (ref 14–44)
LYMPHOCYTES NFR BLD AUTO: 24 % (ref 14–44)
MCH RBC QN AUTO: 34.1 PG (ref 26.8–34.3)
MCH RBC QN AUTO: 34.5 PG (ref 26.8–34.3)
MCHC RBC AUTO-ENTMCNC: 32.3 G/DL (ref 31.4–37.4)
MCHC RBC AUTO-ENTMCNC: 32.4 G/DL (ref 31.4–37.4)
MCV RBC AUTO: 105 FL (ref 82–98)
MCV RBC AUTO: 106 FL (ref 82–98)
MONOCYTES # BLD AUTO: 1.77 THOUSAND/ΜL (ref 0.17–1.22)
MONOCYTES # BLD AUTO: 2.09 THOUSAND/ΜL (ref 0.17–1.22)
MONOCYTES NFR BLD AUTO: 10 % (ref 4–12)
MONOCYTES NFR BLD AUTO: 13 % (ref 4–12)
NEUTROPHILS # BLD AUTO: 12.02 THOUSANDS/ΜL (ref 1.85–7.62)
NEUTROPHILS # BLD AUTO: 9.8 THOUSANDS/ΜL (ref 1.85–7.62)
NEUTS SEG NFR BLD AUTO: 59 % (ref 43–75)
NEUTS SEG NFR BLD AUTO: 68 % (ref 43–75)
NRBC BLD AUTO-RTO: 0 /100 WBCS
NRBC BLD AUTO-RTO: 0 /100 WBCS
PLATELET # BLD AUTO: 330 THOUSANDS/UL (ref 149–390)
PLATELET # BLD AUTO: 368 THOUSANDS/UL (ref 149–390)
PMV BLD AUTO: 10.1 FL (ref 8.9–12.7)
PMV BLD AUTO: 10.7 FL (ref 8.9–12.7)
POTASSIUM SERPL-SCNC: 3.9 MMOL/L (ref 3.5–5.3)
PROT SERPL-MCNC: 6.2 G/DL (ref 6.4–8.2)
RBC # BLD AUTO: 3.17 MILLION/UL (ref 3.81–5.12)
RBC # BLD AUTO: 3.45 MILLION/UL (ref 3.81–5.12)
SODIUM SERPL-SCNC: 139 MMOL/L (ref 136–145)
WBC # BLD AUTO: 16.63 THOUSAND/UL (ref 4.31–10.16)
WBC # BLD AUTO: 17.57 THOUSAND/UL (ref 4.31–10.16)

## 2018-12-23 PROCEDURE — 85025 COMPLETE CBC W/AUTO DIFF WBC: CPT | Performed by: FAMILY MEDICINE

## 2018-12-23 PROCEDURE — 80053 COMPREHEN METABOLIC PANEL: CPT | Performed by: FAMILY MEDICINE

## 2018-12-23 PROCEDURE — 82948 REAGENT STRIP/BLOOD GLUCOSE: CPT

## 2018-12-23 PROCEDURE — 99232 SBSQ HOSP IP/OBS MODERATE 35: CPT | Performed by: FAMILY MEDICINE

## 2018-12-23 RX ADMIN — ENOXAPARIN SODIUM 40 MG: 40 INJECTION SUBCUTANEOUS at 08:50

## 2018-12-23 RX ADMIN — LEVOCARNITINE 750 MG: 1 SOLUTION ORAL at 22:37

## 2018-12-23 RX ADMIN — VALPROIC ACID 250 MG: 500 SOLUTION ORAL at 14:32

## 2018-12-23 RX ADMIN — TOPIRAMATE 200 MG: 100 TABLET, FILM COATED ORAL at 08:50

## 2018-12-23 RX ADMIN — INSULIN LISPRO 1 UNITS: 100 INJECTION, SOLUTION INTRAVENOUS; SUBCUTANEOUS at 22:33

## 2018-12-23 RX ADMIN — VALPROIC ACID 250 MG: 500 SOLUTION ORAL at 22:37

## 2018-12-23 RX ADMIN — RUFINAMIDE 1200 MG: 200 TABLET, FILM COATED ORAL at 08:50

## 2018-12-23 RX ADMIN — PERAMPANEL 10 MG: 0.5 SUSPENSION ORAL at 14:32

## 2018-12-23 RX ADMIN — LEVOCARNITINE 750 MG: 1 SOLUTION ORAL at 08:50

## 2018-12-23 RX ADMIN — TOPIRAMATE 200 MG: 100 TABLET, FILM COATED ORAL at 16:37

## 2018-12-23 RX ADMIN — MULTIVITAMIN 5 ML: LIQUID ORAL at 08:49

## 2018-12-23 RX ADMIN — INSULIN LISPRO 1 UNITS: 100 INJECTION, SOLUTION INTRAVENOUS; SUBCUTANEOUS at 12:26

## 2018-12-23 RX ADMIN — LEVOCARNITINE 750 MG: 1 SOLUTION ORAL at 16:35

## 2018-12-23 RX ADMIN — VALPROIC ACID 250 MG: 500 SOLUTION ORAL at 05:50

## 2018-12-23 RX ADMIN — SENNOSIDES AND DOCUSATE SODIUM 1 TABLET: 8.6; 5 TABLET ORAL at 08:50

## 2018-12-23 RX ADMIN — LEVETIRACETAM 1000 MG: 100 SOLUTION ORAL at 22:37

## 2018-12-23 RX ADMIN — RUFINAMIDE 1200 MG: 200 TABLET, FILM COATED ORAL at 16:35

## 2018-12-23 RX ADMIN — LEVETIRACETAM 1000 MG: 100 SOLUTION ORAL at 08:50

## 2018-12-23 RX ADMIN — MELATONIN 6 MG: at 22:37

## 2018-12-23 RX ADMIN — PHENOBARBITAL 40 MG: 20 LIQUID ORAL at 22:37

## 2018-12-23 NOTE — SOCIAL WORK
Informed by Dr Shanell Davila that pt is medically stable for discharge tomorrow  Cm contacted [facility] and spoke with Rosemarie Alvarenga LPN who stated that pt is able to return  Cm arranged BLS transport with [facility] for a BLS transport for an 11 am  on 12/24/18  Pt's bedside ZEE Lopez and Rosemarie Alvarenga at [facility] made aware of same  CMN completed and faxed to [facility] as requested  Informed Rosemarie Alvarenga pt had mitts on and were recently removed  She stated that the 11 am  is fine as long as the mitts are off

## 2018-12-24 VITALS
OXYGEN SATURATION: 98 % | DIASTOLIC BLOOD PRESSURE: 58 MMHG | WEIGHT: 119.71 LBS | BODY MASS INDEX: 23.5 KG/M2 | HEIGHT: 60 IN | SYSTOLIC BLOOD PRESSURE: 100 MMHG | HEART RATE: 96 BPM | TEMPERATURE: 98.3 F | RESPIRATION RATE: 18 BRPM

## 2018-12-24 PROBLEM — J18.9 PNEUMONIA: Status: RESOLVED | Noted: 2017-02-22 | Resolved: 2018-12-24

## 2018-12-24 LAB
ALBUMIN SERPL BCP-MCNC: 2.2 G/DL (ref 3.5–5)
ALP SERPL-CCNC: 174 U/L (ref 46–116)
ALT SERPL W P-5'-P-CCNC: 79 U/L (ref 12–78)
ANION GAP SERPL CALCULATED.3IONS-SCNC: 8 MMOL/L (ref 4–13)
AST SERPL W P-5'-P-CCNC: 88 U/L (ref 5–45)
BACTERIA BLD CULT: NORMAL
BACTERIA BLD CULT: NORMAL
BILIRUB SERPL-MCNC: 0.41 MG/DL (ref 0.2–1)
BUN SERPL-MCNC: 9 MG/DL (ref 5–25)
CALCIUM SERPL-MCNC: 8.8 MG/DL (ref 8.3–10.1)
CHLORIDE SERPL-SCNC: 105 MMOL/L (ref 100–108)
CO2 SERPL-SCNC: 26 MMOL/L (ref 21–32)
CREAT SERPL-MCNC: 0.31 MG/DL (ref 0.6–1.3)
GFR SERPL CREATININE-BSD FRML MDRD: 145 ML/MIN/1.73SQ M
GLUCOSE SERPL-MCNC: 147 MG/DL (ref 65–140)
GLUCOSE SERPL-MCNC: 176 MG/DL (ref 65–140)
POTASSIUM SERPL-SCNC: 4.3 MMOL/L (ref 3.5–5.3)
PROCALCITONIN SERPL-MCNC: 0.55 NG/ML
PROT SERPL-MCNC: 6.4 G/DL (ref 6.4–8.2)
SODIUM SERPL-SCNC: 139 MMOL/L (ref 136–145)

## 2018-12-24 PROCEDURE — 82948 REAGENT STRIP/BLOOD GLUCOSE: CPT

## 2018-12-24 PROCEDURE — 80053 COMPREHEN METABOLIC PANEL: CPT | Performed by: FAMILY MEDICINE

## 2018-12-24 PROCEDURE — 99239 HOSP IP/OBS DSCHRG MGMT >30: CPT | Performed by: INTERNAL MEDICINE

## 2018-12-24 PROCEDURE — 84145 PROCALCITONIN (PCT): CPT | Performed by: FAMILY MEDICINE

## 2018-12-24 RX ORDER — PHENOBARBITAL 20 MG/5ML
40 ELIXIR ORAL
Qty: 300 ML | Refills: 0 | Status: SHIPPED | OUTPATIENT
Start: 2018-12-24 | End: 2019-01-10 | Stop reason: SDUPTHER

## 2018-12-24 RX ADMIN — LEVETIRACETAM 1000 MG: 100 SOLUTION ORAL at 08:54

## 2018-12-24 RX ADMIN — ENOXAPARIN SODIUM 40 MG: 40 INJECTION SUBCUTANEOUS at 08:51

## 2018-12-24 RX ADMIN — MULTIVITAMIN 5 ML: LIQUID ORAL at 08:54

## 2018-12-24 RX ADMIN — RUFINAMIDE 1200 MG: 200 TABLET, FILM COATED ORAL at 08:56

## 2018-12-24 RX ADMIN — INSULIN LISPRO 1 UNITS: 100 INJECTION, SOLUTION INTRAVENOUS; SUBCUTANEOUS at 05:50

## 2018-12-24 RX ADMIN — SENNOSIDES AND DOCUSATE SODIUM 1 TABLET: 8.6; 5 TABLET ORAL at 08:52

## 2018-12-24 RX ADMIN — TOPIRAMATE 200 MG: 100 TABLET, FILM COATED ORAL at 08:52

## 2018-12-24 RX ADMIN — LEVOCARNITINE 750 MG: 1 SOLUTION ORAL at 08:53

## 2018-12-24 RX ADMIN — VALPROIC ACID 250 MG: 500 SOLUTION ORAL at 05:43

## 2018-12-24 NOTE — DISCHARGE SUMMARY
Discharge Summary - [address] Hospitalist Service - Internal Medicine      Patient Information: Robert Centeno 41 y o  female MRN: [id number]  Unit/Bed#: [facility] 211-23 Encounter: [id number]    Discharging Physician / Practitioner: Karla Sullivan MD  PCP: Farheen Mtz DO  Admission Date:   Admission Orders     Ordered        12/19/18 2208  Inpatient Admission  Once             Discharge Date: 12/24/18      Reason for Admission:  Transfer from [place] [address] for septic shock      Discharge Diagnoses:     Principal Problem (Resolved):    Septic shock - Pneumonia possibly due to MRSA    Lennox-Gastaut syndrome with tonic seizures     Active Problems:    Intellectual disability    Leukocytosis      Consultations During Hospital Stay:  · Neurology  · Pulmonology      Hospital Course:     Robert Centeno is a 41 y o  female patient who originally presented as a transfer to the [facility] 12/19/2018 from the [facility] due to septic shock status post intubation  The patient has a history of Lennox-Gastaut syndrome with seizures which was deemed the possible cause of pneumonia  Sputum culture test positive for MRSA along with her MRSA screening and she was treated with a course of broad-spectrum intravenous antibiotics  She was eventually extubated (initially intubated due to acute hypoxic respiratory failure from the aforementioned issue) and has been oxygenating on room air  Due to her intellectual disability coupled with her intractable seizure disorder, she undergoes tube feeding and is currently maintained on Jevity at 40cc/hr  Blood cultures remain negative  She still has residual leukocytosis but remains afebrile  Will be discharged to a skilled rehab facility today        Condition at Discharge: fair       Discharge Day Visit / Exam:     Vitals: Blood Pressure: 100/58 (12/24/18 0642)  Pulse: 96 (12/24/18 0642)  Temperature: 98 3 °F (36 8 °C) (12/24/18 0642)  Temp Source: Axillary (12/24/18 1034)  Respirations: 18 (12/24/18 5214)  Height: 5' (152 4 cm) (12/21/18 2013)  Weight - Scale: 54 3 kg (119 lb 11 4 oz) (12/24/18 0642)  SpO2: 98 % (12/24/18 3299)      Physical exam - I had a face-to-face encounter with the patient on day of discharge  Discussion with Patient and/or Family:  The patient has been advised to return to the ER immediately if any symptoms recur or worsen  Discharge instructions/Information to Patient and/or Family:   See after visit summary for information provided to patient and/or family  Provisions for Follow-Up Care:  See after visit summary for information related to follow-up care and any pertinent home health orders  Disposition:   Skilled rehab      Discharge Medications:  See after visit summary for reconciled discharge medications provided to patient and/or family  Discharge Statement:  I spent 38 minutes discharging the patient  This time was spent on the day of discharge  I had direct contact with the patient on the day of discharge  Greater than 50% of the total time was spent examining patient, answering all patient questions, arranging and discussing plan of care with patient as well as directly providing post-discharge instructions  Additional time then spent on discharge activities     ** Please Note: This note is constructed using a voice recognition dictation system   **

## 2018-12-24 NOTE — ASSESSMENT & PLAN NOTE
· Admitted to the intensive care unit  · Continue with the antibiotics  · Currently off of the antibiotics  · WBC count is up trending  · Monitor  · Monitor LFTs tomorrow

## 2018-12-24 NOTE — PLAN OF CARE
CARDIOVASCULAR - ADULT     Maintains optimal cardiac output and hemodynamic stability Completed     Absence of cardiac dysrhythmias or at baseline rhythm Completed        Manjula Lujan Discharge to home or other facility with appropriate resources Completed        DISCHARGE PLANNING - CARE MANAGEMENT     Discharge to post-acute care or home with appropriate resources Completed        GASTROINTESTINAL - ADULT     Minimal or absence of nausea and/or vomiting Completed     Maintains or returns to baseline bowel function Completed     Maintains adequate nutritional intake Completed        GENITOURINARY - ADULT     Maintains or returns to baseline urinary function Completed     Absence of urinary retention Completed     Urinary catheter remains patent Completed        HEMATOLOGIC - ADULT     Maintains hematologic stability Completed        INFECTION - ADULT     Absence or prevention of progression during hospitalization Completed        Knowledge Deficit     Patient/family/caregiver demonstrates understanding of disease process, treatment plan, medications, and discharge instructions Completed        METABOLIC, FLUID AND ELECTROLYTES - ADULT     Electrolytes maintained within normal limits Completed     Fluid balance maintained Completed     Glucose maintained within target range Completed        MUSCULOSKELETAL - ADULT     Maintain or return mobility to safest level of function Completed     Maintain proper alignment of affected body part Completed        NEUROSENSORY - ADULT     Achieves stable or improved neurological status Completed     Absence of seizures Completed     Remains free of injury related to seizures activity Completed     Achieves maximal functionality and self care Completed        Nutrition/Hydration-ADULT     Nutrient/Hydration intake appropriate for improving, restoring or maintaining nutritional needs Completed        PAIN - ADULT     Verbalizes/displays adequate comfort level or baseline comfort level Completed        Potential for Falls     Patient will remain free of falls Completed        Prexisting or High Potential for Compromised Skin Integrity     Skin integrity is maintained or improved Completed        RESPIRATORY - ADULT     Achieves optimal ventilation and oxygenation Completed        SAFETY ADULT     Patient will remain free of falls Completed     Maintain or return to baseline ADL function Completed     Maintain or return mobility status to optimal level Completed        SAFETY,RESTRAINT: NV/NON-SELF DESTRUCTIVE BEHAVIOR     Remains free of harm/injury (restraint for non violent/non self-detsructive behavior) Completed     Returns to optimal restraint-free functioning Completed        SKIN/TISSUE INTEGRITY - ADULT     Skin integrity remains intact Completed     Incision(s), wounds(s) or drain site(s) healing without S/S of infection Completed     Oral mucous membranes remain intact Completed

## 2018-12-24 NOTE — PROGRESS NOTES
Progress Note - Michael Cabello 1980, 41 y o  female MRN: [id number]    Unit/Bed#: [facility] 718-01 Encounter: [id number]    Primary Care Provider: Wilma Aparicio DO   Date and time admitted to hospital: 2018 10:02 PM        Seizure disorder [facility])   Assessment & Plan    · Neurology evaluation appreciated  · Continue with the current antiepileptic     Intellectual disability   Assessment & Plan    · At baseline  · Continue with the tube feeding       Lennox-Gastaut syndrome with tonic seizures ([employer] [facility] [address] )   Assessment & Plan    · Neurology evaluation appreciated  · Continue with the current antiepileptic medication  · Intractable seizures      * Pneumonia   Assessment & Plan    · Admitted to the intensive care unit  · Continue with the antibiotics  · Currently off of the antibiotics  · WBC count is up trending  · Monitor  · Monitor LFTs tomorrow       VTE Pharmacologic Prophylaxis:   Pharmacologic: Enoxaparin (Lovenox)  Mechanical VTE Prophylaxis in Place: Yes    Patient Centered Rounds: I have performed bedside rounds with nursing staff today  Discussions with Specialists or Other Care Team Provider:     Education and Discussions with Family / Patient:  Updated father over phone , father requested me to call the mother  Called mother left voicemail    Time Spent for Care: 30 minutes  More than 50% of total time spent on counseling and coordination of care as described above      Current Length of Stay: 4 day(s)    Current Patient Status: Inpatient   Certification Statement: The patient will continue to require additional inpatient hospital stay due to pending rehab placement    Discharge Plan:     Code Status: Level 1 - Full Code    Subjective:   Patient seen and examined , no events overnight    Objective:     Vitals:   Temp (24hrs), Av 2 °F (36 8 °C), Min:97 5 °F (36 4 °C), Max:99 4 °F (37 4 °C)    Temp:  [97 5 °F (36 4 °C)-99 4 °F (37 4 °C)] 99 4 °F (37 4 °C)  HR:  [84-90] 87  Resp:  [16-18] 18  BP: ()/(58-70) 95/61  SpO2:  [94 %-99 %] 94 %  Body mass index is 23 34 kg/m²  Input and Output Summary (last 24 hours): Intake/Output Summary (Last 24 hours) at 12/23/18 1932  Last data filed at 12/23/18 1831   Gross per 24 hour   Intake                0 ml   Output             2600 ml   Net            -2600 ml       Physical Exam:     Physical Exam   HENT:   Head: Normocephalic  Eyes: Right eye exhibits no discharge  Neck: Normal range of motion  No JVD present  Cardiovascular: Normal rate  No murmur heard  Pulmonary/Chest: Effort normal    Decreased breath sounds anteriorly   Abdominal: Soft  Peg tube in   Musculoskeletal:   Contracted extremities   Neurological: She is alert  Patient trying to push me away during the examination       Additional Data:     Labs:      Results from last 7 days  Lab Units 12/23/18  0549   WBC Thousand/uL 16 63*   HEMOGLOBIN g/dL 10 8*   HEMATOCRIT % 33 4*   PLATELETS Thousands/uL 330   NEUTROS PCT % 59   LYMPHS PCT % 24   MONOS PCT % 13*   EOS PCT % 1       Results from last 7 days  Lab Units 12/23/18  0549   POTASSIUM mmol/L 3 9   CHLORIDE mmol/L 107   CO2 mmol/L 23   BUN mg/dL 6   CREATININE mg/dL 0 28*   CALCIUM mg/dL 8 4   ALK PHOS U/L 166*   ALT U/L 81*   AST U/L 89*       Results from last 7 days  Lab Units 12/18/18  0502   INR  1 46*       * I Have Reviewed All Lab Data Listed Above  * Additional Pertinent Lab Tests Reviewed: [address] Admission Reviewed    Imaging:    Imaging Reports Reviewed Today Include:   Imaging Personally Reviewed by Myself Includes:     Recent Cultures (last 7 days):       Results from last 7 days  Lab Units 12/19/18  1242 12/19/18  1241 12/17/18  1607 12/17/18  1430 12/17/18  1138   BLOOD CULTURE  No Growth After 4 Days  No Growth After 4 Days    --   --   --    SPUTUM CULTURE   --   --   --   --  1 colony Methicillin Resistant Staphylococcus aureus*  Few Colonies of    GRAM STAIN RESULT   --   --  2+ Polys  No bacteria seen 3+ Polys  No bacteria seen 3+ Polys  Rare Gram positive cocci in pairs   BODY FLUID CULTURE, STERILE   --   --   --  No growth  --        Last 24 Hours Medication List:     Current Facility-Administered Medications:  enoxaparin 40 mg Subcutaneous Q24H [address] Lizeth Weiss MD   insulin lispro 1-5 Units Subcutaneous Q6H Cynthia Griffiths MD   levETIRAcetam 1,000 mg Oral Q12H Jon Jordan MD   levOCARNitine 750 mg Oral TID Luis Daniel Regalado MD   melatonin 6 mg Per G Tube HS KATHY Contreras   multivitamin with iron-minerals 5 mL Per G Tube Daily KATHY Etienne   ondansetron 4 mg Intravenous Q4H PRN Luis Daniel Regalado MD   perampanel 10 mg Per OG Tube Daily Yanna Mendoza MD   PHENobarbital 40 mg Oral HS Luis Daniel Regalado MD   rufinamide 1,200 mg Oral BID With Meals Luis Daniel Regalado MD   senna-docusate sodium 1 tablet Per G Tube Daily KATHY Contreras   topiramate 200 mg Oral BID Luis Daniel Regalado MD   valproic acid 250 mg Oral [facility] Luis Daniel Regalado MD        Today, Patient Was Seen By: Nilesh Vazquez MD    ** Please Note: Dictation voice to text software may have been used in the creation of this document   **

## 2018-12-24 NOTE — SOCIAL WORK
[address] to inform of pt's discharge today and  time and spoke with Mayra Liriano  Left message for pt's mom Yvette Duong, contact # [phone] to inform her of pt's discharge today at 11 am to return to [facility]  Facility Agency Transfer form and CMN completed  Chart copy requested

## 2018-12-26 ENCOUNTER — HOSPITAL ENCOUNTER (EMERGENCY)
Facility: HOSPITAL | Age: 38
Discharge: HOME | End: 2018-12-26
Attending: EMERGENCY MEDICINE
Payer: MEDICARE

## 2018-12-26 VITALS
RESPIRATION RATE: 16 BRPM | HEIGHT: 60 IN | BODY MASS INDEX: 23.5 KG/M2 | HEART RATE: 100 BPM | WEIGHT: 119.71 LBS | TEMPERATURE: 98.7 F | OXYGEN SATURATION: 98 % | SYSTOLIC BLOOD PRESSURE: 92 MMHG | DIASTOLIC BLOOD PRESSURE: 68 MMHG

## 2018-12-26 DIAGNOSIS — Z46.59 ENCOUNTER FOR FEEDING TUBE PLACEMENT: Primary | ICD-10-CM

## 2018-12-26 PROCEDURE — 99283 EMERGENCY DEPT VISIT LOW MDM: CPT

## 2018-12-26 NOTE — ED PROVIDER NOTES
History  Chief Complaint   Patient presents with    Feeding Tube Problem     81-year-old female with history of seizure disorder is nonverbal presents with removal of feeding tube from [facility] nursing home  She is here to have the feeding tube replaced she has otherwise been in her usual state of health she is not otherwise able to provide history  Prior to Admission Medications   Prescriptions Last Dose Informant Patient Reported? Taking?    MELATONIN PO   Yes No   Si mg by Per G Tube route daily at bedtime   Multiple Vitamins-Minerals (MULTIVITAMIN WITH IRON-MINERALS) liquid   Yes No   Si mL by Per G Tube route daily   PHENobarbital 20 mg/5 mL elixir   No No   Sig: Take 10 mL (40 mg total) by mouth daily at bedtime for 10 days   Probiotic Product (ALEXANDER-BID PROBIOTIC PO)   Yes No   Si tablet by Per G Tube route daily     Sennosides-Docusate Sodium (SENEXON-S PO)   Yes No   Si tablet by Per G Tube route daily     bisacodyl (FLEET) 10 MG/30ML ENEM   Yes No   Sig: Insert 10 mg into the rectum as needed for constipation   chlorhexidine (HIBICLENS) 4 % external liquid   No No   Sig: Apply 1 application topically daily   enoxaparin (LOVENOX) 40 mg/0 4 mL   No No   Sig: Inject 0 4 mL (40 mg total) under the skin every 24 hours   ibuprofen (MOTRIN) 200 mg tablet   Yes No   Sig: by Per G Tube route every 6 (six) hours as needed for mild pain   lactulose 20 g/30 mL   No No   Si mL (30 g total) by Per G Tube route 3 (three) times a day   levETIRAcetam (KEPPRA) 100 mg/mL oral solution   No No   Sig: 10 mL (1,000 mg total) by Per G Tube route every 12 (twelve) hours for 30 days   levOCARNitine (CARNITOR) 1 g/10 mL solution   No No   Si 5 mL (750 mg total) by Per G Tube route 3 (three) times a day   magnesium hydroxide (MILK OF MAGNESIA) 400 mg/5 mL oral suspension   Yes No   Sig: Take 30 mL by mouth daily as needed for constipation   ondansetron (ZOFRAN) 4 mg tablet   Yes No   Si mg by Per G Tube route every 8 (eight) hours as needed for nausea or vomiting     perampanel (FYCOMPA) oral suspension   No No   Si mL (10 mg total) by Per OG Tube route daily for 10 days   rufinamide (BANZEL) 400 mg tablet   No No   Sig: 3 tablets (1,200 mg total) by Per G Tube route 2 (two) times a day   topiramate (TOPAMAX) 200 MG tablet   No No   Sig: Take 1 tablet (200 mg total) by mouth every 12 (twelve) hours   valproic acid (DEPAKENE) 250 MG/5ML soln   No No   Si mL (250 mg total) by Per G Tube route every 8 (eight) hours      Facility-Administered Medications: None       Past Medical History:   Diagnosis Date    ADHD     Anoxic brain damage (HCC)     Autistic disorder     Hyperammonemia (HCC)     Hyperkeratosis     Hypotension     Intellectual disability     Intellectual disability     Lennox-Gastaut syndrome with tonic seizures (HCC)     Lethargy     Liver enzyme elevation     Onychomycosis     Osteoporosis     Osteoporosis     Psychiatric disorder     anxiety       Past Surgical History:   Procedure Laterality Date    ABDOMINAL SURGERY      CARDIAC PACEMAKER PLACEMENT      IR THORACENTESIS  2018    JEJUNOSTOMY FEEDING TUBE      PEG TUBE PLACEMENT         History reviewed  No pertinent family history  I have reviewed and agree with the history as documented  Social History   Substance Use Topics    Smoking status: Never Smoker    Smokeless tobacco: Never Used    Alcohol use No        Review of Systems   Unable to perform ROS: Patient nonverbal       Physical Exam  Physical Exam   Constitutional: She appears well-developed  No distress  HENT:   Head: Normocephalic  Right Ear: External ear normal    Left Ear: External ear normal    Nose: Nose normal    Dry oropharynx   Eyes: Pupils are equal, round, and reactive to light  Conjunctivae and EOM are normal  Left eye exhibits no discharge  Neck: Normal range of motion  Neck supple     Cardiovascular: Normal rate and regular rhythm  Pulmonary/Chest: Effort normal and breath sounds normal  No respiratory distress  Abdominal: Soft  Bowel sounds are normal    LUQ stoma for feeding tube  soft   Skin: She is not diaphoretic  Psychiatric:   flat       Vital Signs  ED Triage Vitals [12/26/18 0216]   Temperature Pulse Respirations Blood Pressure SpO2   98 7 °F (37 1 °C) 100 16 92/68 98 %      Temp Source Heart Rate Source Patient Position - Orthostatic VS BP Location FiO2 (%)   Temporal Monitor -- Left arm --      Pain Score       --           Vitals:    12/26/18 0216   BP: 92/68   Pulse: 100       Visual Acuity      ED Medications  Medications - No data to display    Diagnostic Studies  Results Reviewed     None                 No orders to display              Procedures  Feeding Tube  Date/Time: 12/26/2018 2:20 AM  Performed by: Andrea Pfeiffer by: Niles Lesch     Patient location:  ED  Consent:     Consent obtained:  Emergent situation  Universal protocol:     Patient identity confirmed:  Arm band  Pre-procedure details: Old tube type:  Gastrostomy    Old tube size:  16 Fr  Indications:     Indications: tube removed by patient    Procedure details:     Patient position:  Supine    Procedure type:  Replacement    Tube type:  Gastrostomy    Tube size:  16 Fr    Bulb inflation volume:  4 ml    Bulb inflation fluid:  Sterile water  Post-procedure details:     Placement/position confirmation:  Auscultation, gastric contents aspirated and insufflation of air    Placement difficulty:  None    Bleeding:  None    Patient tolerance of procedure:   Tolerated well, no immediate complications           Phone Contacts  ED Phone Contact    ED Course                               MDM  CritCare Time    Disposition  Final diagnoses:   Encounter for feeding tube placement     Time reflects when diagnosis was documented in both MDM as applicable and the Disposition within this note     Time User Action Codes Description Comment    12/26/2018  2:27 AM Debbie Gerardo Add [Z78 [id number]] Encounter for feeding tube placement       ED Disposition     ED Disposition Condition Comment    Discharge  [facility] discharge to home/self care      Condition at discharge: Stable        Follow-up Information     Follow up With Specialties Details Why [address], DO Family Medicine  As needed [address] [facility] [address] [phone]            Discharge Medication List as of 12/26/2018  2:29 AM      CONTINUE these medications which have NOT CHANGED    Details   bisacodyl (FLEET) 10 MG/30ML ENEM Insert 10 mg into the rectum as needed for constipation, Historical Med      chlorhexidine (HIBICLENS) 4 % external liquid Apply 1 application topically daily, Starting Sat 10/13/2018, No Print      enoxaparin (LOVENOX) 40 mg/0 4 mL Inject 0 4 mL (40 mg total) under the skin every 24 hours, Starting Sat 10/13/2018, No Print      ibuprofen (MOTRIN) 200 mg tablet by Per G Tube route every 6 (six) hours as needed for mild pain, Historical Med      lactulose 20 g/30 mL 45 mL (30 g total) by Per G Tube route 3 (three) times a day, Starting Sat 10/13/2018, No Print      levETIRAcetam (KEPPRA) 100 mg/mL oral solution 10 mL (1,000 mg total) by Per G Tube route every 12 (twelve) hours for 30 days, Starting Tue 9/4/2018, Until Fri 12/14/2018, Print      levOCARNitine (CARNITOR) 1 g/10 mL solution 7 5 mL (750 mg total) by Per G Tube route 3 (three) times a day, Starting Tue 9/4/2018, Print      magnesium hydroxide (MILK OF MAGNESIA) 400 mg/5 mL oral suspension Take 30 mL by mouth daily as needed for constipation, Historical Med      MELATONIN PO 6 mg by Per G Tube route daily at bedtime, Historical Med      Multiple Vitamins-Minerals (MULTIVITAMIN WITH IRON-MINERALS) liquid 5 mL by Per G Tube route daily, Historical Med      ondansetron (ZOFRAN) 4 mg tablet 4 mg by Per G Tube route every 8 (eight) hours as needed for nausea or vomiting  , Historical Med      perampanel [facility]) oral suspension 20 mL (10 mg total) by Per OG Tube route daily for 10 days, Starting Mon 12/24/2018, Until Thu 1/3/2019, Print      PHENobarbital 20 mg/5 mL elixir Take 10 mL (40 mg total) by mouth daily at bedtime for 10 days, Starting Mon 12/24/2018, Until Thu 1/3/2019, Print      Probiotic Product (ALEXANDER-BID PROBIOTIC PO) 1 tablet by Per G Tube route daily  , Historical Med      rufinamide (BANZEL) 400 mg tablet 3 tablets (1,200 mg total) by Per G Tube route 2 (two) times a day, Starting Tue 9/4/2018, Print      Sennosides-Docusate Sodium (SENEXON-S PO) 1 tablet by Per G Tube route daily  , Until Discontinued, Historical Med      topiramate (TOPAMAX) 200 MG tablet Take 1 tablet (200 mg total) by mouth every 12 (twelve) hours, Starting Tue 9/4/2018, Print      valproic acid (DEPAKENE) 250 MG/5ML soln 5 mL (250 mg total) by Per G Tube route every 8 (eight) hours, Starting Tue 9/4/2018, Print           No discharge procedures on file      ED Provider  Electronically Signed by           Sherly Sullivan MD  12/26/18 0799

## 2018-12-27 ENCOUNTER — TELEPHONE (OUTPATIENT)
Dept: NEUROLOGY | Facility: CLINIC | Age: 38
End: 2018-12-27

## 2018-12-27 NOTE — TELEPHONE ENCOUNTER
Laxmi To, nurse [facility] calling to inform us that the patient's mother told the nursing staff that when she was visiting patient on 12/25, the patient was  having "mini seizures"  Laxmi To could not provide any additional info, since none of the nursing staff had witnessed any episodes at any time  Patient was recently hospitalized at [facility] for septic shock status post intubation, pneumonia, tested positive for MRSA, d/c 12/24  Patient received broad-spectrum IV ABX while inpatient  Laxmi To [phone] [facility] for [facility]'s mother to call back to get more info on what occurred when she visited patient on 12/25

## 2018-12-28 NOTE — TELEPHONE ENCOUNTER
Spoke to patient's father, Myles Alvarenga Chace Prajapati wasn't available)  He states they were visiting her on [place] and she kept having mini-seizures the entire time they were there (about 1 hour)  He states her eyes roll back and her arms stiffen up and she just lays there, would occur every few minutes  He will have Beth Nogueira call us back to get more info

## 2018-12-28 NOTE — TELEPHONE ENCOUNTER
Please have them give us an update on how she is doing  Please also confirm her medication dosing  I see that she was just discharged from the hospital on 12/24 and was seen in the ED for a G tube replacement  Please confirm if she has been getting her medications on schedule

## 2018-12-28 NOTE — TELEPHONE ENCOUNTER
I called [facility] and spoke to andres  she states that she is her normal basline  sleeping most of the day, she is arousable  said a couple of words today but incoherent  vss   this is her baseline  no seizures that they are aware of   current medications  keppra 100mg/ml 10ml q12  fycompa 20ml in am-this was given at hs prior to hospitalization adn was previously getting 16ml qhs  valproic acid 250ml/5ml-5ml q 8  phenobarb 20mg/5ml-10ml qhs-prior to hospitalization she was getting 15ml qhs   banzel 400mg 3 tabs bid  levocarnitine 1g/10ml 7 5ml tid  topamax 200mg 1 tab q 12  g tube is intact and patent and meds are given on schedule        We are still waiting for pt's mom to call back with additional details from [place]

## 2018-12-31 NOTE — TELEPHONE ENCOUNTER
Ms Whitley Hernandez has intractable epilepsy from [address]  When ever she is on continuous EEG monitoring she has very frequent tonic seizures  She has failed an innumerable number of medications  She is appropriate for Epidiolex  I would like to see her in the office next week to go over this medication  Risk is due to polypharmacy with her current 6 medications sedation, liver toxicity, transaminitis  Is there an opening next week for me to see her when I'm in the office?

## 2019-01-02 NOTE — TELEPHONE ENCOUNTER
Per our conversation, patient scheduled for 1/10 at 4pm  Spoke with Jessica Vazquez at [facility]

## 2019-01-02 NOTE — TELEPHONE ENCOUNTER
There is opennings in [facility] next week, if the nursing facility will transport her there  Did you want a 30 or 60 min appt?

## 2019-01-08 NOTE — PROGRESS NOTES
[facility] IP from 12/19/2018 - 12/24/2018 for septic shock  Transferrred back to [facility] where she resides LTC  Will continue to follow as a bundle pt

## 2019-01-09 NOTE — PROGRESS NOTES
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, remote history of anoxic brain injury, h/o status epilepticus (extremely frequent tonic seizures during sleep), severe intellectual disability  She has a VNS due to intractable epilepsy  Ms  Rogelio Ache epilepsy has been extremely difficult to control despite the use of 6 antiepileptic medications, complicated by significant side effects of lethargy and hyperammoniemia  Her EEG study is always atrocious with constant generalized tonic seizures  It seems that she requires topiramate for seizure prevention and valproic acid helps  It is unclear if Banzel, Levetiracetam, and Fycompa have been helpful  We need to wean her off of valproic acid due to hyperammoniemia and recent transaminitis  However, in trying to replace one of her antiepileptic medications with phenobarbital it has been unsuccessful, because it is likely causing excessive sedation  We need wean her off of phenobarbital so that she is not so sedated  More recently the increase in Fycompa can also be contributing to excessive sedation (valproic acid may be preventing its clearance)  If she is still sedated after phenobarbital is reduced, we may need to reduce Fycompa back to 8mg/day  Due to LGS epileptic encephalopathy, she is a candidate for Epidiolex  I discussed with her father and caretakers that side effects of Epidiolex does include excessive sedation, transaminitis, GI side effects, and ataxia  It is only dispensed by specialty pharmacy and certain forms have to be completed for its approval       It is postulated that the excessive ammonia level is due to abnormal mitochondrial activity with valproate exposure    Due to improvement with seizure control with valproate, supplementation with L-carnitine is necessary to counteract hyperammoniemia  She should also continue with lactulose  Hershall Jae has intractable epilepsy, unlikely to achieve seizure freedom and she is not a surgical candidate due to the underlying generalized seizures  Plan:   1 - continue Valproic acid 250mg/5mL give 5mL every 8 hours   2 - continue Banzel 400mg give 3 tabs twice a day   3 - continue Topiramate 200mg tab one tab twice a day    4 - continue Levetiracetam 100mg/mL give 10mL twice a day   5 - Continue Fycompa 0 5mg/mL oral solution give 20mL (10mg) at bedtime   6 - decrease phenobarbital elixir 20mg/5mL to 5mL (20mg) at bedtime for 2 weeks then stop  7  - continue with levocarnitine 1gm/10mL give 7 5mL three times a day   8 - check LFT, Ammonia, VPA free level in 1 month  9 - After LFT, Ammonia is checked in 1 month, plan on starting Epidiolex (Cannabadiol) starting with 100mg/mL give 1mL twice a day for 1 week then 2mL twice a day  Parents will need to sign HIPAA Patient Authorization Form sign by a parent (fax signed form to [phone])  10 - follow-up in 6 weeks, SOVL    Problem List Items Addressed This Visit        Nervous and Auditory    Drug-induced encephalopathy    Lennox-Gastaut syndrome with tonic seizures (HCC) - Primary    Relevant Medications    PHENobarbital 20 mg/5 mL elixir    perampanel (FYCOMPA) oral suspension    Other Relevant Orders    Valproic acid level, free    Ammonia    Hepatic function panel       Other    Intellectual disability    Relevant Medications    PHENobarbital 20 mg/5 mL elixir    Hyperammonemia (HCC)    Somnolence    Transaminitis    Sedated due to multiple medications        Chief Complaint:    Chief Complaint     Seizures        HPI:    Dara Roa is a 41 y o  unknown handed female here for follow-up evaluation of intractable epilepsy in the setting of LGS        Interval History 1/10/2018  In December 2018, Pee Leary was admitted to hospital for hypoxia and fever found to have PNA with pleural effusion, septic shock and aspiration, she was subsequently intubated  She had an EEG that captured recurrent tonic seizures during sleep but this is consistently found in all of her other EEG studies  Phenobarbital was decreased to reduce sedation  Fycompa was increased to 10mg at bedtime  She has returned to her SNF, parents have noticed increase in seizure frequency (tonic seizures)  She also had an incrase in AST/ALT levels  Her albumin was 1 3-2 2 in December 2018  She is here with her CNA who has known her for a long time  Her CNA reports that Ken Marshall is no longer Ken Marshall, she is essentially bed ridden  She does not eat and does not walk  Ken Marshall usually participate in activities, walking, and eating food (if she was at a [facility] she would be able to eat a hamburger without difficulty)  I was able to speak to her father Mac Phalen and he reported that he too saw a significant decline in mental status  There was a phone call from [place] [id number] reporting an increase in seizure activity (brief episodes tonic-myoclonic jerking); but subsequent reports did not notice an increase in activity  She has not had a convulsive seizure  I spoke with [facility] unit nurse at [place] [id number], but it is his first day there and he is not familiar with Kne Marshall  AED/side effects/compliance:  Valproic acid 250mg/5mL give 5mL three times a day   Banzel 400mg give 3 tabs twice a day   Topiramate 200mg tab twice a day   Levetiracetam 100mg/mL give 10mL twice a day   Fycompa 0 5mg/5mL give 20mL (10mg) at bedtime (oral solution)  Phenobarbital 20mg/5mL give 10mL (40mg) nightly     levocarnitine 1gm/10mL give 7 5mL three times a day and lactulose for VPA induced hyperammoniemia    Event/Seizure semiology:  Seizure semiology:  1  She was described to have drop attacks or spells with grunting sounds and falling to the floor  2  Her nurse commented on episodes of spasms or myoclonic jerking of the right arm      3  Generalized convulsions  4  Tonic seizures of eyes rolling back (mostly during sleep)    Prior Epilepsy History:  Collective epilepsy history 11/6/2014 was previously evaluated by Dr Rosa Sorensen including a hospitalization in February 2013 for status epilepticus, in the setting of missed doses of AEDs due to refusal to eat  She subsequently required anesthetic induced coma to stop her seizures and PEG tube was placed  She was seen almost every month for management of her seizures up until November 2013  She has medically refractory seizures and had a VNS placed possibly in the early 2000s and a generator changed in 2011  Unknown AEDs that were tried but felbamate is listed as an allergy  In 2011, her AEDs were clonazepam, levetiracetam, Depakote and Lamictal  Dr Rosa Sorensen had started [facility] in 2011  In 2012, Tiajuana Beans was added with the reduction of clonazepam  There were difficulty with documenting seizure frequency; but seizures were no longer daily occurrences but there were clusters of seizures  There would be good periods and bad periods of seizure frequency  Dr Rosa Sorensen had been increasing the duty cycle of the VNS over the course of 2012 to 2013  Sometime between August 2013 and October 2013, topiramate was introduced  She was in status epilepticus in 2013, she was on Keppra, Banzel, Onfi, and Lamictal  She had an EEG at some point that showed multifocal spike-wave and background attenuation  She has intermittent agitation and day time somnolence  When she was hospitalized for status epilepticus, she was started on methylphenidate to help wake her up  Intake history November 2014:  VPA level 127  Her Valproic Acid dose was previously 250mg q6hrs based on Dr Toby Briones notes  Since July 2014, she has been receiving VPA 500mg q6hrs  She has not been hospitalized since September 2013  She was previously ambulatory with therapy  She spends most of her time sleeping for the past couple of months    She has also been receiving lactulose for elevated ammonia levels  (older drugs VPA, LVT, LMG, newer drugs added on since 2011 RFN, Onfi, TPM)     Summary of 2015: We decreased VPA from TDD 2000mg to 1000mg with increased agitation/combativeness, alternating days of exhaustion (no behavioral specialist has been involved)  are randomly reported by multiple staff members  Seizures are typically reported by CNA's or her one to one on the 3PM to 11PM shift  Seizures are described a brief events of eyes rolling back and arms going up in the air, followed by hair pulling or slapping herself  These seizures were reported as either sporadic or every other day episodes  There have been no documented grand mal seizures or drop attacks  She refuses to wear safety helmet, rips at her hair, and attempts to flip herself out of her chair and bed  Dali Vasquez can walk briefly but walks on her toes, she frequently will allow herself to fall down when she does not want to walk  It does not appear that methylphenidate has been useful in maintaining her level of wakefulness during the day  VNS generator change on 11/3/2015  The Model 103 (serial [id number]) was replaced with Smart Sparrow Model 105, serial [id number]  Weaned off of levetiracetam due to multiple medications and agitation; by the end of 2016, she was down to -500  Summary of 2016:  Started with RFN [id number], -250, CLB 0 5-0 5-25,  QID, -200 attempted to wean off of LEV and reduced the dose of VPA given that there were no reports of seizures and she was sedated, along with elevated ammonia levels  It was unclear as to how effective LEV was for her seizure control  When she missed a dose of TPM in September 2016, she had multiple seizures  We attempted to compensate for increase in seizures by increasing Onfi to 20-20; however, it seems that it made her more sedated  Summary of 2017  RFN [id number], -250, Onfi 20-20, -200,  q8hrs  She has been more somnolent  She continues to have tonic seizures when she is asleep, eyes rolling body, arms will tense up with some twitching, last a few seconds, but will recur  There are no seizures during the daytime  We attempted to wean off of VPA due to ammonia / somnolence; but there was an increase in tonic seizures (tonic stiffness, eyes rolling upwards, and subtle arm (right?) jerking)  She was admitted to hospital 10/10 to 10/26/2017, due to seizures, was put on continuous video EEG monitoring to determine her seizure type, seizure frequency, and rapid medication adjustments  The increased in seizure frequency was likely due to an underlying infection, but also she likely had frequent seizures during sleep which did not cause much injury to the patient  A number of medication adjustments were attempted including discontinuation of lamotrigine due to concern about it worsening seizures, reinstitution of levetiracetam, attempted discontinuation of valproic acid, attempted reduction in Onfi, and starting Fycompa  The patient's seizures were mostly based during sleep, tonic seizures  When valproic acid was discontinued there was an increased number of seizures  Valproic acid was subsequently reinstituted  Summary of 2018  Started with RFN [id number], PER 8, LEV 5842-2491, CLB 5-15, -250,  TID  Due to excessive somnolence Onfi was weaned off  Phenobarbital   She was on continuous EEG monitoring when she was in hospital for PNA that showed very frequent tonic seizures during sleep  VPA is causing hyperammoniemia  She has had more admissions for somnolence / lethargy, VPA was reduced for transaminitis  She was tested for inborn error of metabolism with plasma amino acid, urine organic acid, and acylcarnitine levels  There were nonspecific elevations in some amino acid or organic acid but no pattern consistent with a specific inborn error of metabolism    Elevated carnitine level was consistent with supplementation  Higher doses of phenobarbital may also contribute to sedation, phenobarbital was reduced to 20mg but on follow-up she was on 20mL of oral solution (80mg / day)  There is variable reporting in the frequency of tonic seizures (these are the eyes are rolling back and shaking, then stop, then happen again in 10-15 minutes)  Her level of alertness is also variable with erratic sleep cycle  Special Features  Status epilepticus: yes  Self Injury Seizures: No  Precipitating Factors: menstrual cycle? ?     Epilepsy Risk Factors:  Abnormal pregnancy: unknown  Abnormal birth/: unknown  Abnormal Development: unknown developmental history  Febrile seizures, simple: unknown  Febrile seizures, complex: unknown  CNS infection: No  Mental retardation: Yes  Cerebral palsy: Yes  Head injury (moderate/severe): possibly anoxic brain injury  CNS neoplasm: No  CNS malformation: No  Neurosurgical procedure: No  Stroke: No  Alcohol abuse: No  Drug abuse: No  Family history Sz/epilepsy: unknown    Prior AEDs:  Rufinamide  Clobazam (excessive sedation)  Clonazepam  Levetiracetam (unclear if beneficial)  Lamotrigine (unclear if beneficial)  Topiramate (missed doses caused breakthrough seizures)  Valproate (as medication was weaned off, there were increased seizures)  Fycompa  Felbamate (listed as a drug allergy)  Phenobarbital (contributing to sedation)    Prior workup:  x   Imaging:  CT head 2013  Normal CT of brain    CT head 2018  No acute intracranial pathology    EEGs:  Continuous video EEG monitoring 10/13 - 10/15/2017  Frequent generalized spike/polyspike-slow wave complexes during sleep  At times there are independent right more than left midtemporal spikes and bitemporal spikes    Innumerable generalized tonic seizures during sleep, generalized 1 Hz period discharges, that would become continuous for 30 seconds or generalized rhythmic alpha-beta discharges, associated with patient becoming rigid, tonic posturing with arms elevated and tense with subtle jerking of the upper body, eyes opening with upward deviation  Dr Kristie Lomas reported 4 ictal patterns:  1 - 1-3 seconds of diffuse electrodecrement then 2-7 seconds of fast activity then 2Hz spike wave discharges (no clinical manifestation)  2 - same as #1, clinically accompanied by posturing of the arms, then clonic jerking  3 - diffuse low fast activity without progressing to spike-wave discharges  4 - 2 Hz generalized discharges for 2-3 minutes with no clonical manifestations  By 10/15/2017 - the tonic clonic seizures were less frequent    Continuous video EEG monitoring 10/18 - 10/25/2017  Diffuse background slowing with bursts of arrhythmic generalized spike wave discharges, with shifting lateralization, and independent left and  right temporal spikes  Mild eyelid myoclonia associated with brief bursts of 2-2 5 Hz generalized discharges  Tonic seizures with eelctrodecrement  There were innumerable tonic seizures; however by 10/25/2017 the frequency of these seizures did decrease in frequency  Continuous video EEG monitoring 12/1-12/5/2017  There are innumerable tonic seizures that are generally less than one minute in duration (30-40 seconds), these consists of subtle eye opening and tonic stiffening of arms, if longer then whole body stiffening with clonic jerking movements  These almost always occur exclusively out of sleep  These consist of 2-2 5 Hz generalized spike-slow wave complexes with generalized attenuation of activity (desynchronization) or paroxysmal fast activity  Per 24 hours count of seizures could be      12/19/2018 routine study  Frequent recurrent tonic seizures associated with paroxysmal generalized fast activity then generalized rhythmic discharges associated with eyes upward deviation, and myoclonic/clonic jerking of the upper body, excess beta activity      Labs:  Component Ammonia   Latest Ref Rng & Units 11 - 35 umol/L   12/14/2018 37 (H)   12/19/2018 22   12/20/2018 31     Component Ammonia   Latest Ref Rng & Units 11 - 35 umol/L   3/4/2018 29   8/13/2018 40 (H)   8/15/2018 46 (H)   8/16/2018 73 (H)   8/17/2018 71 (H)   8/20/2018 78 (H)   8/22/2018 98 (H)       Component LEVETIRACETA (KEPPRA) PHENOBARBITAL LEVEL   Latest Ref Rng & Units ug/mL 15 0 - 40 0 ug/mL   10/14/2018 17 3    12/16/2018  13 0 (L)   12/20/2018  13 2 (L)     Component VALPROIC ACID TOTAL Valproic Acid, Free   Latest Ref Rng & Units 50 - 100 ug/mL 6 0 - 22 0 ug/mL   12/16/2018 25 (L)    12/17/2018  9 3   12/20/2018 42 (L)      Component      Latest Ref Rng & Units 9/6/2018   LEVETIRACETA (KEPPRA)      10 0 - 40 0 ug/mL 51 4 (H)   VALPROIC ACID TOTAL      50 - 100 ug/mL 72   PHENOBARBITAL LEVEL      15 0 - 40 0 ug/mL 17 3   TOPIRAMATE LEVEL      2 0 - 25 0 ug/mL 9 8       General exam   Blood pressure 92/64, pulse 80, height 5' (1 524 m), weight 46 3 kg (102 lb)    Appearance: excessively sedated, nonverbal  Carotids: n/a  Cardiovascular: regular rate and rhythm  Pulmonary: unable to hear  Extremities: no edema    HEENT: anicteric   Fundoscopy: not assessed    Mental status  Orientation: nonverbal  Fund of Knowledge: nonverbal   Attention and Concentration: nonverbal  Current and Remote Memory:nonverbal  Language: nonverbal    Cranial Nerves  CN 1: not tested  CN 2: pupils equal round reactive to direct and consenual light   CN 3, 4, 6: dysconjugate eyes, mild nystagmus with movement  CN 5:not assessed  CN 7:muscles of facial expression are symmetric and corneal reflex is intact symmetrically  CN 8:not assessed  CN 9, 10:not assessed  CN 11:not assessed  CN 12:not assessed    Motor:  Bulk, Tone: thin muscle build, relatively hypotonic tone  Pronation: not assessed  Strength: patient did not participate in strength testing    Sensory:  Lighttouch: not assessed due to cognitive impairment    Coordination: Unable to test    Reflexes: not assessed    Past Medical/Surgical History:  Patient Active Problem List   Diagnosis    Drug-induced encephalopathy    Lennox-Gastaut syndrome with tonic seizures (HCC)    Lactic acidosis    Underweight    Intellectual disability    Hyperammonemia (HCC)    Hypokalemia    Hypernatremia    Osteoporosis    Onychomycosis    Lethargy    Hyperkeratosis    Intractable epilepsy without status epilepticus (HCC)    Somnolence    Transaminitis    Hypotension    Incontinence    Aspiration pneumonia (HCC)    Severe sepsis with septic shock (CODE) (HCC)    Skin breakdown    MRSA colonization    Hypoalbuminemia    Right atrial enlargement    Pleural effusion    Electrolyte abnormality    Hypophosphatemia    Diarrhea    Sedated due to multiple medications     Past Medical History:   Diagnosis Date    ADHD     Anoxic brain damage (HCC)     Autistic disorder     Hyperammonemia (HCC)     Hyperkeratosis     Hypotension     Intellectual disability     Intellectual disability     Lennox-Gastaut syndrome with tonic seizures (HCC)     Lethargy     Liver enzyme elevation     Onychomycosis     Osteoporosis     Osteoporosis     Psychiatric disorder     anxiety     Past Surgical History:   Procedure Laterality Date    ABDOMINAL SURGERY      CARDIAC PACEMAKER PLACEMENT      IR THORACENTESIS  12/17/2018    JEJUNOSTOMY FEEDING TUBE      PEG TUBE PLACEMENT       Past Psychiatric History:  Behavioral agitation    Medications:    Current Outpatient Prescriptions:     enoxaparin (LOVENOX) 40 mg/0 4 mL, Inject 0 4 mL (40 mg total) under the skin every 24 hours, Disp: , Rfl: 0    ibuprofen (MOTRIN) 200 mg tablet, by Per G Tube route every 6 (six) hours as needed for mild pain, Disp: , Rfl:     lactulose 20 g/30 mL, 45 mL (30 g total) by Per G Tube route 3 (three) times a day, Disp: , Rfl: 0    levETIRAcetam (KEPPRA) 100 mg/mL oral solution, 10 mL (1,000 mg total) by Per G Tube route every 12 (twelve) hours for 30 days, Disp: 600 mL, Rfl: 5    levOCARNitine (CARNITOR) 1 g/10 mL solution, 7 5 mL (750 mg total) by Per G Tube route 3 (three) times a day, Disp: 474 mL, Rfl: 7    MELATONIN PO, 6 mg by Per G Tube route daily at bedtime, Disp: , Rfl:     Multiple Vitamins-Minerals (MULTIVITAMIN WITH IRON-MINERALS) liquid, 5 mL by Per G Tube route daily, Disp: , Rfl:     ondansetron (ZOFRAN) 4 mg tablet, 4 mg by Per G Tube route every 8 (eight) hours as needed for nausea or vomiting  , Disp: , Rfl:     perampanel (FYCOMPA) oral suspension, 20 mL (10 mg total) by Per OG Tube route daily, Disp: 680 mL, Rfl: 5    PHENobarbital 20 mg/5 mL elixir, Take 5 mL (20 mg total) by mouth daily at bedtime for 14 days Then stop this medication  , Disp: 150 mL, Rfl: 0    Probiotic Product (ALEXANDER-BID PROBIOTIC PO), 1 tablet by Per G Tube route daily  , Disp: , Rfl:     rufinamide (BANZEL) 400 mg tablet, 3 tablets (1,200 mg total) by Per G Tube route 2 (two) times a day, Disp: 180 tablet, Rfl: 5    Sennosides-Docusate Sodium (SENEXON-S PO), 1 tablet by Per G Tube route daily  , Disp: , Rfl:     topiramate (TOPAMAX) 200 MG tablet, Take 1 tablet (200 mg total) by mouth every 12 (twelve) hours, Disp: 60 tablet, Rfl: 5    valproic acid (DEPAKENE) 250 MG/5ML soln, 5 mL (250 mg total) by Per G Tube route every 8 (eight) hours, Disp: 450 mL, Rfl: 5    Allergies: Allergies   Allergen Reactions    Felbamate        Family history:  History reviewed  No pertinent family history  There is no family history of seizure, epilepsy or developmental delay  Social History  Living situation:  Resident of [facility]  Social History   Substance Use Topics    Smoking status: Never Smoker    Smokeless tobacco: Never Used    Alcohol use No      Review of Systems  A review of at least 12 organ/systems was evaluated, no complaints or normal except for:   Constitutional: Positive for fatigue  Negative for appetite change and fever       Decision making was of high-complexity due to the patient's high risk condition (seizures), psychiatric and neuropsychological comorbidities, behavioral problems, memory and cognitive problems and medication side effects  The total amount of time spent with the patient was 50 minutes  More than 50% of this time was devoted to counseling and coordination of care  Issues addressed during this clinic visit included overall management, medication counseling or monitoring (including adverse effects, side effects and risks of antiepileptic medications)     Start time: 4:10PM  End time: 5PM    Neurology/Epilepsy Procedure Note    VNS Interrogation only performed on 1/10/2019    Model: M105  S/N: [id number]  Date of implant: 11/3/2015    Current settings:  Output Current 2 mAmp   Signal Frequency 30 Hz   Pulse Width 500 microsec   Signal On Time 21 sec   Signal Off Time 0 8 min     Magnet settings:  Mag Output 2 25 mAmp   Pulse Width 500 microsec   Mag On Time 60 sec     Diagnostics:  Communication OK  Output current 2mAmp  Lead Impedance OK  Impedance value 1669 Ohms  IFI No  Battery indicator 25-50%    Lifetime Magnet activation 7 (last time 9/26/2016)  % stimulation 36%

## 2019-01-10 ENCOUNTER — PATIENT OUTREACH (OUTPATIENT)
Dept: CASE MANAGEMENT | Facility: OTHER | Age: 39
End: 2019-01-10

## 2019-01-10 ENCOUNTER — OFFICE VISIT (OUTPATIENT)
Dept: NEUROLOGY | Facility: CLINIC | Age: 39
End: 2019-01-10
Payer: MEDICARE

## 2019-01-10 VITALS
DIASTOLIC BLOOD PRESSURE: 64 MMHG | SYSTOLIC BLOOD PRESSURE: 92 MMHG | HEART RATE: 80 BPM | WEIGHT: 102 LBS | HEIGHT: 60 IN | BODY MASS INDEX: 20.03 KG/M2

## 2019-01-10 DIAGNOSIS — G40.812 LENNOX-GASTAUT SYNDROME WITH TONIC SEIZURES (HCC): Primary | ICD-10-CM

## 2019-01-10 DIAGNOSIS — E72.20 HYPERAMMONEMIA (HCC): ICD-10-CM

## 2019-01-10 DIAGNOSIS — R74.01 TRANSAMINITIS: ICD-10-CM

## 2019-01-10 DIAGNOSIS — F79 INTELLECTUAL DISABILITY: ICD-10-CM

## 2019-01-10 DIAGNOSIS — G92.8 DRUG-INDUCED ENCEPHALOPATHY: ICD-10-CM

## 2019-01-10 DIAGNOSIS — R40.0 SOMNOLENCE: ICD-10-CM

## 2019-01-10 DIAGNOSIS — R41.89 SEDATED DUE TO MULTIPLE MEDICATIONS: ICD-10-CM

## 2019-01-10 PROBLEM — J96.01 ACUTE RESPIRATORY FAILURE WITH HYPOXIA (HCC): Status: RESOLVED | Noted: 2017-12-02 | Resolved: 2019-01-10

## 2019-01-10 PROCEDURE — 95970 ALYS NPGT W/O PRGRMG: CPT | Performed by: PSYCHIATRY & NEUROLOGY

## 2019-01-10 PROCEDURE — 99215 OFFICE O/P EST HI 40 MIN: CPT | Performed by: PSYCHIATRY & NEUROLOGY

## 2019-01-10 RX ORDER — PHENOBARBITAL 20 MG/5ML
5 ELIXIR ORAL
Qty: 150 ML | Refills: 0 | Status: SHIPPED | OUTPATIENT
Start: 2019-01-10 | End: 2019-02-14

## 2019-01-10 NOTE — LETTER
2019     Maryjo Robertson DO  [address] [facility] [address]    Patient: Ирина Turk   YOB: 1980   Date of Visit: 1/10/2019       Dear Dr Dawn Roberson:    Thank you for referring Brandy Macedo to me for evaluation  Below are my notes for this consultation  If you have questions, please do not hesitate to call me  I look forward to following your patient along with you  Sincerely,        Brenda Park MD        CC: No Recipients  Brenda Park MD  2019 11:34 PM  Sign at close encounter  Patient's Name: Ирина Turk   Patient's : 1980   Visit Type: follow-up  Referring MD / PCP:  Maryjo Robertson DO     Assessment:    Ms Brandy Macedo is a 41 y o  woman with Diannia Nakayama Syndrome, remote history of anoxic brain injury, h/o status epilepticus (extremely frequent tonic seizures during sleep), severe intellectual disability  She has a VNS due to intractable epilepsy  Ms Klaudia Traore epilepsy has been extremely difficult to control despite the use of 6 antiepileptic medications, complicated by significant side effects of lethargy and hyperammoniemia  Her EEG study is always atrocious with constant generalized tonic seizures  It seems that she requires topiramate for seizure prevention and valproic acid helps  It is unclear if Banzel, Levetiracetam, and Fycompa have been helpful  We need to wean her off of valproic acid due to hyperammoniemia and recent transaminitis  However, in trying to replace one of her antiepileptic medications with phenobarbital it has been unsuccessful, because it is likely causing excessive sedation  We need wean her off of phenobarbital so that she is not so sedated  More recently the increase in Fycompa can also be contributing to excessive sedation (valproic acid may be preventing its clearance)  If she is still sedated after phenobarbital is reduced, we may need to reduce Fycompa back to 8mg/day      Due to LGS epileptic encephalopathy, she is a candidate for Epidiolex  I discussed with her father and caretakers that side effects of Epidiolex does include excessive sedation, transaminitis, GI side effects, and ataxia  It is only dispensed by specialty pharmacy and certain forms have to be completed for its approval       It is postulated that the excessive ammonia level is due to abnormal mitochondrial activity with valproate exposure  Due to improvement with seizure control with valproate, supplementation with L-carnitine is necessary to counteract hyperammoniemia  She should also continue with lactulose  Dorcari Correaast has intractable epilepsy, unlikely to achieve seizure freedom and she is not a surgical candidate due to the underlying generalized seizures  Plan:   1 - continue Valproic acid 250mg/5mL give 5mL every 8 hours   2 - continue Banzel 400mg give 3 tabs twice a day   3 - continue Topiramate 200mg tab one tab twice a day    4 - continue Levetiracetam 100mg/mL give 10mL twice a day   5 - Continue Fycompa 0 5mg/mL oral solution give 20mL (10mg) at bedtime   6 - decrease phenobarbital elixir 20mg/5mL to 5mL (20mg) at bedtime for 2 weeks then stop  7  - continue with levocarnitine 1gm/10mL give 7 5mL three times a day   8 - check LFT, Ammonia, VPA free level in 1 month  9 - After LFT, Ammonia is checked in 1 month, plan on starting Epidiolex (Cannabadiol) starting with 100mg/mL give 1mL twice a day for 1 week then 2mL twice a day    Parents will need to sign HIPAA Patient Authorization Form sign by a parent (fax signed form to [phone])  10 - follow-up in 6 weeks, SOVL    Problem List Items Addressed This Visit        Nervous and Auditory    Drug-induced encephalopathy    Lennox-Gastaut syndrome with tonic seizures (HCC) - Primary    Relevant Medications    PHENobarbital 20 mg/5 mL elixir    perampanel (FYCOMPA) oral suspension    Other Relevant Orders    Valproic acid level, free    Ammonia    Hepatic function panel Other    Intellectual disability    Relevant Medications    PHENobarbital 20 mg/5 mL elixir    Hyperammonemia (HCC)    Somnolence    Transaminitis    Sedated due to multiple medications        Chief Complaint:    Chief Complaint     Seizures        HPI:    Vikash Griffin is a 41 y o  unknown handed female here for follow-up evaluation of intractable epilepsy in the setting of LGS  Interval History 1/10/2018  In December 2018, Lia Daniel was admitted to hospital for hypoxia and fever found to have PNA with pleural effusion, septic shock and aspiration, she was subsequently intubated  She had an EEG that captured recurrent tonic seizures during sleep but this is consistently found in all of her other EEG studies  Phenobarbital was decreased to reduce sedation  Fycompa was increased to 10mg at bedtime  She has returned to her SNF, parents have noticed increase in seizure frequency (tonic seizures)  She also had an incrase in AST/ALT levels  Her albumin was 1 3-2 2 in December 2018  She is here with her CNA who has known her for a long time  Her CNA reports that Lia Daniel is no longer Lia Outhouse, she is essentially bed ridden  She does not eat and does not walk  Lia Daniel usually participate in activities, walking, and eating food (if she was at a [facility] she would be able to eat a hamburger without difficulty)  I was able to speak to her father Petr Cardozo and he reported that he too saw a significant decline in mental status  There was a phone call from [place] [address] reporting an increase in seizure activity (brief episodes tonic-myoclonic jerking); but subsequent reports did not notice an increase in activity  She has not had a convulsive seizure  I spoke with Shira Mcdonald unit nurse at [place] [address], but it is his first day there and [place] is not [place]      AED/side effects/compliance:  Valproic acid 250mg/5mL give 5mL three times a day   Banzel 400mg give 3 tabs twice a day   Topiramate 200mg tab twice a day   Levetiracetam 100mg/mL give 10mL twice a day   Fycompa 0 5mg/5mL give 20mL (10mg) at bedtime (oral solution)  Phenobarbital 20mg/5mL give 10mL (40mg) nightly     levocarnitine 1gm/10mL give 7 5mL three times a day and lactulose for VPA induced hyperammoniemia    Event/Seizure semiology:  Seizure semiology:  1  She was described to have drop attacks or spells with grunting sounds and falling to the floor  2  Her nurse commented on episodes of spasms or myoclonic jerking of the right arm  3  Generalized convulsions  4  Tonic seizures of eyes rolling back (mostly during sleep)    Prior Epilepsy History:  Collective epilepsy history 11/6/2014 was previously evaluated by Dr Glenford Denver including a hospitalization in February 2013 for status epilepticus, in the setting of missed doses of AEDs due to refusal to eat  She subsequently required anesthetic induced coma to stop her seizures and PEG tube was placed  She was seen almost every month for management of her seizures up until November 2013  She has medically refractory seizures and had a VNS placed possibly in the early 2000s and a generator changed in 2011  Unknown AEDs that were tried but felbamate is listed as an allergy  In 2011, her AEDs were clonazepam, levetiracetam, Depakote and Lamictal  Dr Glenford Denver had started [facility] in 2011  In 2012, Akil Darrell was added with the reduction of clonazepam  There were difficulty with documenting seizure frequency; but seizures were no longer daily occurrences but there were clusters of seizures  There would be good periods and bad periods of seizure frequency  Dr Glenford Denver had been increasing the duty cycle of the VNS over the course of 2012 to 2013  Sometime between August 2013 and October 2013, topiramate was introduced  She was in status epilepticus in 2013, she was on Keppra, Banzel, Onfi, and Lamictal  She had an EEG at some point that showed multifocal spike-wave and background attenuation   She has intermittent agitation and day time somnolence  When she was hospitalized for status epilepticus, she was started on methylphenidate to help wake her up  Intake history November 2014:  VPA level 127  Her Valproic Acid dose was previously 250mg q6hrs based on Dr Yeni Eddy notes  Since July 2014, she has been receiving VPA 500mg q6hrs  She has not been hospitalized since September 2013  She was previously ambulatory with therapy  She spends most of her time sleeping for the past couple of months  She has also been receiving lactulose for elevated ammonia levels  (older drugs VPA, LVT, LMG, newer drugs added on since 2011 RFN, Onfi, TPM)     Summary of 2015: We decreased VPA from TDD 2000mg to 1000mg with increased agitation/combativeness, alternating days of exhaustion (no behavioral specialist has been involved)  are randomly reported by multiple staff members  Seizures are typically reported by CNA's or her one to one on the 3PM to 11PM shift  Seizures are described a brief events of eyes rolling back and arms going up in the air, followed by hair pulling or slapping herself  These seizures were reported as either sporadic or every other day episodes  There have been no documented grand mal seizures or drop attacks  She refuses to wear safety helmet, rips at her hair, and attempts to flip herself out of her chair and bed  Verl Coad can walk briefly but walks on her toes, she frequently will allow herself to fall down when she does not want to walk  It does not appear that methylphenidate has been useful in maintaining her level of wakefulness during the day  VNS generator change on 11/3/2015  The Model 103 (serial [id number]) was replaced with [employer]ark Model 105, serial [id number]  Weaned off of levetiracetam due to multiple medications and agitation; by the end of 2016, she was down to -500        Summary of 2016:  Started with RFN [id number], -250, CLB 0 5-0 5-25,  QID, -200 attempted to wean off of LEV and reduced the dose of VPA given that there were no reports of seizures and she was sedated, along with elevated ammonia levels  It was unclear as to how effective LEV was for her seizure control  When she missed a dose of TPM in September 2016, she had multiple seizures  We attempted to compensate for increase in seizures by increasing Onfi to 20-20; however, it seems that it made her more sedated  Summary of 2017  RFN [id number], -250, Onfi 20-20, -200,  q8hrs  She has been more somnolent  She continues to have tonic seizures when she is asleep, eyes rolling body, arms will tense up with some twitching, last a few seconds, but will recur  There are no seizures during the daytime  We attempted to wean off of VPA due to ammonia / somnolence; but there was an increase in tonic seizures (tonic stiffness, eyes rolling upwards, and subtle arm (right?) jerking)  She was admitted to hospital 10/10 to 10/26/2017, due to seizures, was put on continuous video EEG monitoring to determine her seizure type, seizure frequency, and rapid medication adjustments  The increased in seizure frequency was likely due to an underlying infection, but also she likely had frequent seizures during sleep which did not cause much injury to the patient  A number of medication adjustments were attempted including discontinuation of lamotrigine due to concern about it worsening seizures, reinstitution of levetiracetam, attempted discontinuation of valproic acid, attempted reduction in Onfi, and starting Fycompa  The patient's seizures were mostly based during sleep, tonic seizures  When valproic acid was discontinued there was an increased number of seizures  Valproic acid was subsequently reinstituted  Summary of 2018  Started with RFN [id number], PER 8, LEV 6003-8748, CLB 5-15, -250,  TID  Due to excessive somnolence Onfi was weaned off    Phenobarbital   She was on continuous EEG monitoring when she was in hospital for PNA that showed very frequent tonic seizures during sleep  VPA is causing hyperammoniemia  She has had more admissions for somnolence / lethargy, VPA was reduced for transaminitis  She was tested for inborn error of metabolism with plasma amino acid, urine organic acid, and acylcarnitine levels  There were nonspecific elevations in some amino acid or organic acid but no pattern consistent with a specific inborn error of metabolism  Elevated carnitine level was consistent with supplementation  Higher doses of phenobarbital may also contribute to sedation, phenobarbital was reduced to 20mg but on follow-up she was on 20mL of oral solution (80mg / day)  There is variable reporting in the frequency of tonic seizures (these are the eyes are rolling back and shaking, then stop, then happen again in 10-15 minutes)  Her level of alertness is also variable with erratic sleep cycle  Special Features  Status epilepticus: yes  Self Injury Seizures: No  Precipitating Factors: menstrual cycle? ?     Epilepsy Risk Factors:  Abnormal pregnancy: unknown  Abnormal birth/: unknown  Abnormal Development: unknown developmental history  Febrile seizures, simple: unknown  Febrile seizures, complex: unknown  CNS infection: No  Mental retardation: Yes  Cerebral palsy: Yes  Head injury (moderate/severe): possibly anoxic brain injury  CNS neoplasm: No  CNS malformation: No  Neurosurgical procedure: No  Stroke: No  Alcohol abuse: No  Drug abuse: No  Family history Sz/epilepsy: unknown    Prior AEDs:  Rufinamide  Clobazam (excessive sedation)  Clonazepam  Levetiracetam (unclear if beneficial)  Lamotrigine (unclear if beneficial)  Topiramate (missed doses caused breakthrough seizures)  Valproate (as medication was weaned off, there were increased seizures)  Fycompa  Felbamate (listed as a drug allergy)  Phenobarbital (contributing to sedation)    Prior workup:  x Imaging:  CT head 2/21/2013  Normal CT of brain    CT head 9/7/2018  No acute intracranial pathology    EEGs:  Continuous video EEG monitoring 10/13  10/15/2017  Frequent generalized spike/polyspike-slow wave complexes during sleep  At times there are independent right more than left midtemporal spikes and bitemporal spikes    Innumerable generalized tonic seizures during sleep, generalized 1 Hz period discharges, that would become continuous for 30 seconds or generalized rhythmic alpha-beta discharges, associated with patient becoming rigid, tonic posturing with arms elevated and tense with subtle jerking of the upper body, eyes opening with upward deviation  Dr Denisse Ybarra reported 4 ictal patterns:  1  1-3 seconds of diffuse electrodecrement then 2-7 seconds of fast activity then 2Hz spike wave discharges (no clinical manifestation)  2  same as #1, clinically accompanied by posturing of the arms, then clonic jerking  3  diffuse low fast activity without progressing to spike-wave discharges  4  2 Hz generalized discharges for 2-3 minutes with no clonical manifestations  By 10/15/2017  the tonic clonic seizures were less frequent    Continuous video EEG monitoring 10/18  10/25/2017  Diffuse background slowing with bursts of arrhythmic generalized spike wave discharges, with shifting lateralization, and independent left and  right temporal spikes  Mild eyelid myoclonia associated with brief bursts of 2-2 5 Hz generalized discharges  Tonic seizures with eelctrodecrement  There were innumerable tonic seizures; however by 10/25/2017 the frequency of these seizures did decrease in frequency  Continuous video EEG monitoring 12/1-12/5/2017  There are innumerable tonic seizures that are generally less than one minute in duration (30-40 seconds), these consists of subtle eye opening and tonic stiffening of arms, if longer then whole body stiffening with clonic jerking movements    These almost always occur exclusively out of sleep  These consist of 2-2 5 Hz generalized spike-slow wave complexes with generalized attenuation of activity (desynchronization) or paroxysmal fast activity  Per 24 hours count of seizures could be      12/19/2018 routine study  Frequent recurrent tonic seizures associated with paroxysmal generalized fast activity then generalized rhythmic discharges associated with eyes upward deviation, and myoclonic/clonic jerking of the upper body, excess beta activity  Labs:  Component Ammonia   Latest Ref Rng & Units 11 - 35 umol/L   12/14/2018 37 (H)   12/19/2018 22   12/20/2018 31     Component Ammonia   Latest Ref Rng & Units 11 - 35 umol/L   3/4/2018 29   8/13/2018 40 (H)   8/15/2018 46 (H)   8/16/2018 73 (H)   8/17/2018 71 (H)   8/20/2018 78 (H)   8/22/2018 98 (H)       Component LEVETIRACETA (KEPPRA) PHENOBARBITAL LEVEL   Latest Ref Rng & Units ug/mL 15 0 - 40 0 ug/mL   10/14/2018 17 3    12/16/2018  13 0 (L)   12/20/2018  13 2 (L)     Component VALPROIC ACID TOTAL Valproic Acid, Free   Latest Ref Rng & Units 50 - 100 ug/mL 6 0 - 22 0 ug/mL   12/16/2018 25 (L)    12/17/2018  9 3   12/20/2018 42 (L)      Component      Latest Ref Rng & Units 9/6/2018   LEVETIRACETA (KEPPRA)      10 0 - 40 0 ug/mL 51 4 (H)   VALPROIC ACID TOTAL      50 - 100 ug/mL 72   PHENOBARBITAL LEVEL      15 0 - 40 0 ug/mL 17 3   TOPIRAMATE LEVEL      2 0 - 25 0 ug/mL 9 8       General exam   Blood pressure 92/64, pulse 80, height 5' (1 524 m), weight 46 3 kg (102 lb)    Appearance: excessively sedated, nonverbal  Carotids: n/a  Cardiovascular: regular rate and rhythm  Pulmonary: unable to hear  Extremities: no edema    HEENT: anicteric   Fundoscopy: not assessed    Mental status  Orientation: nonverbal  Fund of Knowledge: nonverbal   Attention and Concentration: nonverbal  Current and Remote Memory:nonverbal  Language: nonverbal    Cranial Nerves  CN 1: not tested  CN 2: pupils equal round reactive to direct and consenual light   CN 3, 4, 6: dysconjugate eyes, mild nystagmus with movement  CN 5:not assessed  CN 7:muscles of facial expression are symmetric and corneal reflex is intact symmetrically  CN 8:not assessed  CN 9, 10:not assessed  CN 11:not assessed  CN 12:not assessed    Motor:  Bulk, Tone: thin muscle build, relatively hypotonic tone  Pronation: not assessed  Strength: patient did not participate in strength testing    Sensory:  Lighttouch: not assessed due to cognitive impairment    Coordination: Unable to test    Reflexes: not assessed    Past Medical/Surgical History:  Patient Active Problem List   Diagnosis    Drug-induced encephalopathy    Lennox-Gastaut syndrome with tonic seizures (HCC)    Lactic acidosis    Underweight    Intellectual disability    Hyperammonemia (HCC)    Hypokalemia    Hypernatremia    Osteoporosis    Onychomycosis    Lethargy    Hyperkeratosis    Intractable epilepsy without status epilepticus (HCC)    Somnolence    Transaminitis    Hypotension    Incontinence    Aspiration pneumonia (HCC)    Severe sepsis with septic shock (CODE) (HCC)    Skin breakdown    MRSA colonization    Hypoalbuminemia    Right atrial enlargement    Pleural effusion    Electrolyte abnormality    Hypophosphatemia    Diarrhea    Sedated due to multiple medications     Past Medical History:   Diagnosis Date    ADHD     Anoxic brain damage (HCC)     Autistic disorder     Hyperammonemia (HCC)     Hyperkeratosis     Hypotension     Intellectual disability     Intellectual disability     Lennox-Gastaut syndrome with tonic seizures (HCC)     Lethargy     Liver enzyme elevation     Onychomycosis     Osteoporosis     Osteoporosis     Psychiatric disorder     anxiety     Past Surgical History:   Procedure Laterality Date    ABDOMINAL SURGERY      CARDIAC PACEMAKER PLACEMENT      IR THORACENTESIS  12/17/2018    JEJUNOSTOMY FEEDING TUBE      PEG TUBE PLACEMENT       Past Psychiatric History:  Behavioral agitation    Medications:    Current Outpatient Prescriptions:     enoxaparin (LOVENOX) 40 mg/0 4 mL, Inject 0 4 mL (40 mg total) under the skin every 24 hours, Disp: , Rfl: 0    ibuprofen (MOTRIN) 200 mg tablet, by Per G Tube route every 6 (six) hours as needed for mild pain, Disp: , Rfl:     lactulose 20 g/30 mL, 45 mL (30 g total) by Per G Tube route 3 (three) times a day, Disp: , Rfl: 0    levETIRAcetam (KEPPRA) 100 mg/mL oral solution, 10 mL (1,000 mg total) by Per G Tube route every 12 (twelve) hours for 30 days, Disp: 600 mL, Rfl: 5    levOCARNitine (CARNITOR) 1 g/10 mL solution, 7 5 mL (750 mg total) by Per G Tube route 3 (three) times a day, Disp: 474 mL, Rfl: 7    MELATONIN PO, 6 mg by Per G Tube route daily at bedtime, Disp: , Rfl:     Multiple Vitamins-Minerals (MULTIVITAMIN WITH IRON-MINERALS) liquid, 5 mL by Per G Tube route daily, Disp: , Rfl:     ondansetron (ZOFRAN) 4 mg tablet, 4 mg by Per G Tube route every 8 (eight) hours as needed for nausea or vomiting  , Disp: , Rfl:     perampanel (FYCOMPA) oral suspension, 20 mL (10 mg total) by Per OG Tube route daily, Disp: 680 mL, Rfl: 5    PHENobarbital 20 mg/5 mL elixir, Take 5 mL (20 mg total) by mouth daily at bedtime for 14 days Then stop this medication  , Disp: 150 mL, Rfl: 0    Probiotic Product (ALEXANDER-BID PROBIOTIC PO), 1 tablet by Per G Tube route daily  , Disp: , Rfl:     rufinamide (BANZEL) 400 mg tablet, 3 tablets (1,200 mg total) by Per G Tube route 2 (two) times a day, Disp: 180 tablet, Rfl: 5    Sennosides-Docusate Sodium (SENEXON-S PO), 1 tablet by Per G Tube route daily  , Disp: , Rfl:     topiramate (TOPAMAX) 200 MG tablet, Take 1 tablet (200 mg total) by mouth every 12 (twelve) hours, Disp: 60 tablet, Rfl: 5    valproic acid (DEPAKENE) 250 MG/5ML soln, 5 mL (250 mg total) by Per G Tube route every 8 (eight) hours, Disp: 450 mL, Rfl: 5    Allergies:   Allergies   Allergen Reactions    Felbamate        Family history:  History reviewed  No pertinent family history  There is no family history of seizure, epilepsy or developmental delay  Social History  Living situation:  Resident of [facility]  Social History   Substance Use Topics    Smoking status: Never Smoker    Smokeless tobacco: Never Used    Alcohol use No      Review of Systems  A review of at least 12 organ/systems was evaluated, no complaints or normal except for:   Constitutional: Positive for fatigue  Negative for appetite change and fever  Decision making was of high-complexity due to the patient's high risk condition (seizures), psychiatric and neuropsychological comorbidities, behavioral problems, memory and cognitive problems and medication side effects  The total amount of time spent with the patient was 50 minutes  More than 50% of this time was devoted to counseling and coordination of care  Issues addressed during this clinic visit included overall management, medication counseling or monitoring (including adverse effects, side effects and risks of antiepileptic medications)     Start time: 4:10PM  End time: 5PM    Neurology/Epilepsy Procedure Note    VNS Interrogation only performed on 1/10/2019    Model: M105  S/N: [id number]  Date of implant: 11/3/2015    Current settings:  Output Current 2 mAmp   Signal Frequency 30 Hz   Pulse Width 500 microsec   Signal On Time 21 sec   Signal Off Time 0 8 min     Magnet settings:  Mag Output 2 25 mAmp   Pulse Width 500 microsec   Mag On Time 60 sec     Diagnostics:  Communication OK  Output current 2mAmp  Lead Impedance OK  Impedance value 1669 Ohms  IFI No  Battery indicator 25-50%    Lifetime Magnet activation 7 (last time 9/26/2016)  % stimulation 36%

## 2019-01-10 NOTE — PATIENT INSTRUCTIONS
I reviewed side effects of Epidiolex (Cannabidiol), which include, but not limited to, drug rash, Beverly Roy syndrome, fevers, hepatitis, liver toxicity, mood swings, irritability, suicidal ideation, abnormal liver enzymes, medication interactions and excessive sedation  Plan:   1 - continue Valproic acid 250mg/5mL give 5mL every 8 hours   2 - continue Banzel 400mg give 3 tabs twice a day   3 - continue Topiramate 200mg tab one tab twice a day    4 - continue Levetiracetam 100mg/mL give 10mL twice a day   5 - Continue Fycompa 0 5mg/mL oral solution give 20mL (10mg) at bedtime   6 - decrease phenobarbital elixir 20mg/5mL to 5mL (20mg) at bedtime for 2 weeks then stop  7  - continue with levocarnitine 1gm/10mL give 7 5mL three times a day   8 - check LFT, Ammonia, VPA free level in 1 month  9 - After LFT, Ammonia is checked in 1 month, plan on starting Epidiolex (Cannabadiol) starting with 100mg/mL give 1mL twice a day for 1 week then 2mL twice a day    Parents will need to sign HIPAA Patient Authorization Form sign by a parent (fax signed form to [phone])  10 - follow-up in 6 weeks, SOVL

## 2019-01-10 NOTE — PROGRESS NOTES
Review of Systems    {LimROS-complete:27373}      Review of Systems   Constitutional: Positive for fatigue  Negative for appetite change and fever  HENT: Negative  Negative for hearing loss, tinnitus, trouble swallowing and voice change  Eyes: Negative  Negative for photophobia and pain  Respiratory: Negative  Negative for shortness of breath  Cardiovascular: Negative  Negative for palpitations  Gastrointestinal: Negative  Negative for nausea and vomiting  Endocrine: Negative  Negative for cold intolerance and heat intolerance  Genitourinary: Negative  Negative for dysuria, frequency and urgency  Musculoskeletal: Negative  Negative for myalgias and neck pain  Skin: Negative  Negative for rash  Neurological: Negative  Negative for dizziness, tremors, seizures, syncope, facial asymmetry, speech difficulty, weakness, light-headedness, numbness and headaches  Hematological: Negative  Does not bruise/bleed easily  Psychiatric/Behavioral: Negative  Negative for confusion, hallucinations and sleep disturbance

## 2019-01-14 ENCOUNTER — HOSPITAL ENCOUNTER (INPATIENT)
Facility: HOSPITAL | Age: 39
LOS: 3 days | Discharge: NURSING, REHAB OR LONG-TERM CARE | DRG: 101 | End: 2019-01-17
Attending: EMERGENCY MEDICINE | Admitting: INTERNAL MEDICINE
Payer: MEDICARE

## 2019-01-14 ENCOUNTER — APPOINTMENT (EMERGENCY)
Dept: CT IMAGING | Facility: HOSPITAL | Age: 39
DRG: 101 | End: 2019-01-14
Payer: MEDICARE

## 2019-01-14 ENCOUNTER — APPOINTMENT (EMERGENCY)
Dept: RADIOLOGY | Facility: HOSPITAL | Age: 39
DRG: 101 | End: 2019-01-14
Payer: MEDICARE

## 2019-01-14 DIAGNOSIS — R79.89 ELEVATED LACTIC ACID LEVEL: ICD-10-CM

## 2019-01-14 DIAGNOSIS — G40.919 BREAKTHROUGH SEIZURE (HCC): Primary | ICD-10-CM

## 2019-01-14 DIAGNOSIS — G40.812 LENNOX-GASTAUT SYNDROME WITH TONIC SEIZURES (HCC): ICD-10-CM

## 2019-01-14 DIAGNOSIS — E88.09 HYPOALBUMINEMIA: ICD-10-CM

## 2019-01-14 DIAGNOSIS — R23.8 SKIN BREAKDOWN: ICD-10-CM

## 2019-01-14 DIAGNOSIS — Z22.322 MRSA COLONIZATION: ICD-10-CM

## 2019-01-14 DIAGNOSIS — N63.0 BREAST MASS: ICD-10-CM

## 2019-01-14 DIAGNOSIS — R74.01 TRANSAMINASEMIA: ICD-10-CM

## 2019-01-14 DIAGNOSIS — A41.9 SEPSIS (HCC): ICD-10-CM

## 2019-01-14 PROBLEM — R19.7 DIARRHEA: Status: RESOLVED | Noted: 2018-12-18 | Resolved: 2019-01-14

## 2019-01-14 PROBLEM — E87.0 HYPERNATREMIA: Status: RESOLVED | Noted: 2017-12-06 | Resolved: 2019-01-14

## 2019-01-14 PROBLEM — G92.8 DRUG-INDUCED ENCEPHALOPATHY: Status: RESOLVED | Noted: 2017-02-23 | Resolved: 2019-01-14

## 2019-01-14 PROBLEM — J90 PLEURAL EFFUSION: Status: RESOLVED | Noted: 2018-12-16 | Resolved: 2019-01-14

## 2019-01-14 PROBLEM — J69.0 ASPIRATION PNEUMONIA (HCC): Status: RESOLVED | Noted: 2018-10-07 | Resolved: 2019-01-14

## 2019-01-14 PROBLEM — R65.21 SEVERE SEPSIS WITH SEPTIC SHOCK (CODE) (HCC): Status: RESOLVED | Noted: 2018-10-09 | Resolved: 2019-01-14

## 2019-01-14 PROBLEM — E87.2 METABOLIC ACIDOSIS, INCREASED ANION GAP (IAG): Status: ACTIVE | Noted: 2019-01-14

## 2019-01-14 PROBLEM — E83.39 HYPOPHOSPHATEMIA: Status: RESOLVED | Noted: 2018-12-18 | Resolved: 2019-01-14

## 2019-01-14 PROBLEM — E87.29 METABOLIC ACIDOSIS, INCREASED ANION GAP (IAG): Status: ACTIVE | Noted: 2019-01-14

## 2019-01-14 PROBLEM — E87.6 HYPOKALEMIA: Status: RESOLVED | Noted: 2017-11-24 | Resolved: 2019-01-14

## 2019-01-14 PROBLEM — E87.8 ELECTROLYTE ABNORMALITY: Status: RESOLVED | Noted: 2018-12-17 | Resolved: 2019-01-14

## 2019-01-14 LAB
ALBUMIN SERPL BCP-MCNC: 3.4 G/DL (ref 3.5–5)
ALP SERPL-CCNC: 159 U/L (ref 46–116)
ALT SERPL W P-5'-P-CCNC: 88 U/L (ref 12–78)
AMMONIA PLAS-SCNC: 58 UMOL/L (ref 11–35)
ANION GAP SERPL CALCULATED.3IONS-SCNC: 12 MMOL/L (ref 4–13)
ANION GAP SERPL CALCULATED.3IONS-SCNC: 20 MMOL/L (ref 4–13)
APTT PPP: 40 SECONDS (ref 26–38)
AST SERPL W P-5'-P-CCNC: 162 U/L (ref 5–45)
ATRIAL RATE: 92 BPM
BACTERIA UR QL AUTO: ABNORMAL /HPF
BASE EX.OXY STD BLDV CALC-SCNC: 93.8 % (ref 60–80)
BASE EXCESS BLDV CALC-SCNC: -5.6 MMOL/L
BASOPHILS # BLD AUTO: 0.04 THOUSANDS/ΜL (ref 0–0.1)
BASOPHILS NFR BLD AUTO: 0 % (ref 0–1)
BILIRUB SERPL-MCNC: 0.9 MG/DL (ref 0.2–1)
BILIRUB UR QL STRIP: NEGATIVE
BUN SERPL-MCNC: 22 MG/DL (ref 5–25)
BUN SERPL-MCNC: 8 MG/DL (ref 5–25)
CALCIUM SERPL-MCNC: 10 MG/DL (ref 8.3–10.1)
CALCIUM SERPL-MCNC: 8.8 MG/DL (ref 8.3–10.1)
CHLORIDE SERPL-SCNC: 106 MMOL/L (ref 100–108)
CHLORIDE SERPL-SCNC: 108 MMOL/L (ref 100–108)
CLARITY UR: CLEAR
CO2 SERPL-SCNC: 18 MMOL/L (ref 21–32)
CO2 SERPL-SCNC: 24 MMOL/L (ref 21–32)
COLOR UR: ABNORMAL
CREAT SERPL-MCNC: 0.42 MG/DL (ref 0.6–1.3)
CREAT SERPL-MCNC: 0.58 MG/DL (ref 0.6–1.3)
EOSINOPHIL # BLD AUTO: 0.01 THOUSAND/ΜL (ref 0–0.61)
EOSINOPHIL NFR BLD AUTO: 0 % (ref 0–6)
ERYTHROCYTE [DISTWIDTH] IN BLOOD BY AUTOMATED COUNT: 13.6 % (ref 11.6–15.1)
GFR SERPL CREATININE-BSD FRML MDRD: 118 ML/MIN/1.73SQ M
GFR SERPL CREATININE-BSD FRML MDRD: 131 ML/MIN/1.73SQ M
GLUCOSE SERPL-MCNC: 169 MG/DL (ref 65–140)
GLUCOSE SERPL-MCNC: 197 MG/DL (ref 65–140)
GLUCOSE SERPL-MCNC: 84 MG/DL (ref 65–140)
GLUCOSE UR STRIP-MCNC: NEGATIVE MG/DL
HCO3 BLDV-SCNC: 18.7 MMOL/L (ref 24–30)
HCT VFR BLD AUTO: 45.1 % (ref 34.8–46.1)
HGB BLD-MCNC: 14.2 G/DL (ref 11.5–15.4)
HGB UR QL STRIP.AUTO: NEGATIVE
IMM GRANULOCYTES # BLD AUTO: 0.03 THOUSAND/UL (ref 0–0.2)
IMM GRANULOCYTES NFR BLD AUTO: 0 % (ref 0–2)
INR PPP: 1.27 (ref 0.86–1.17)
KETONES UR STRIP-MCNC: NEGATIVE MG/DL
LACTATE SERPL-SCNC: 2 MMOL/L (ref 0.5–2)
LACTATE SERPL-SCNC: 3.1 MMOL/L (ref 0.5–2)
LACTATE SERPL-SCNC: 4.6 MMOL/L (ref 0.5–2)
LEUKOCYTE ESTERASE UR QL STRIP: NEGATIVE
LIPASE SERPL-CCNC: 477 U/L (ref 73–393)
LYMPHOCYTES # BLD AUTO: 1.74 THOUSANDS/ΜL (ref 0.6–4.47)
LYMPHOCYTES NFR BLD AUTO: 19 % (ref 14–44)
MAGNESIUM SERPL-MCNC: 2 MG/DL (ref 1.6–2.6)
MCH RBC QN AUTO: 33 PG (ref 26.8–34.3)
MCHC RBC AUTO-ENTMCNC: 31.5 G/DL (ref 31.4–37.4)
MCV RBC AUTO: 105 FL (ref 82–98)
MONOCYTES # BLD AUTO: 0.58 THOUSAND/ΜL (ref 0.17–1.22)
MONOCYTES NFR BLD AUTO: 6 % (ref 4–12)
MUCOUS THREADS UR QL AUTO: ABNORMAL
NEUTROPHILS # BLD AUTO: 6.84 THOUSANDS/ΜL (ref 1.85–7.62)
NEUTS SEG NFR BLD AUTO: 75 % (ref 43–75)
NITRITE UR QL STRIP: NEGATIVE
NON-SQ EPI CELLS URNS QL MICRO: ABNORMAL /HPF
NRBC BLD AUTO-RTO: 0 /100 WBCS
O2 CT BLDV-SCNC: 19.9 ML/DL
P AXIS: 75 DEGREES
PCO2 BLDV: 33.7 MM HG (ref 42–50)
PH BLDV: 7.36 [PH] (ref 7.3–7.4)
PH UR STRIP.AUTO: 6 [PH] (ref 4.5–8)
PLATELET # BLD AUTO: 285 THOUSANDS/UL (ref 149–390)
PMV BLD AUTO: 11.6 FL (ref 8.9–12.7)
PO2 BLDV: 83.4 MM HG (ref 35–45)
POTASSIUM SERPL-SCNC: 4 MMOL/L (ref 3.5–5.3)
POTASSIUM SERPL-SCNC: 4.2 MMOL/L (ref 3.5–5.3)
PR INTERVAL: 146 MS
PROT SERPL-MCNC: 8.3 G/DL (ref 6.4–8.2)
PROT UR STRIP-MCNC: ABNORMAL MG/DL
PROTHROMBIN TIME: 15.3 SECONDS (ref 11.8–14.2)
QRS AXIS: 80 DEGREES
QRSD INTERVAL: 66 MS
QT INTERVAL: 326 MS
QTC INTERVAL: 403 MS
RBC # BLD AUTO: 4.3 MILLION/UL (ref 3.81–5.12)
RBC #/AREA URNS AUTO: ABNORMAL /HPF
SODIUM SERPL-SCNC: 144 MMOL/L (ref 136–145)
SODIUM SERPL-SCNC: 144 MMOL/L (ref 136–145)
SP GR UR STRIP.AUTO: >=1.03 (ref 1–1.03)
T WAVE AXIS: 60 DEGREES
TROPONIN I SERPL-MCNC: 0.02 NG/ML
UROBILINOGEN UR QL STRIP.AUTO: 0.2 E.U./DL
VENTRICULAR RATE: 92 BPM
WBC # BLD AUTO: 9.24 THOUSAND/UL (ref 4.31–10.16)
WBC #/AREA URNS AUTO: ABNORMAL /HPF

## 2019-01-14 PROCEDURE — 83690 ASSAY OF LIPASE: CPT | Performed by: EMERGENCY MEDICINE

## 2019-01-14 PROCEDURE — 82948 REAGENT STRIP/BLOOD GLUCOSE: CPT

## 2019-01-14 PROCEDURE — 83605 ASSAY OF LACTIC ACID: CPT | Performed by: EMERGENCY MEDICINE

## 2019-01-14 PROCEDURE — 71260 CT THORAX DX C+: CPT

## 2019-01-14 PROCEDURE — 94664 DEMO&/EVAL PT USE INHALER: CPT

## 2019-01-14 PROCEDURE — 82140 ASSAY OF AMMONIA: CPT | Performed by: EMERGENCY MEDICINE

## 2019-01-14 PROCEDURE — 93010 ELECTROCARDIOGRAM REPORT: CPT | Performed by: INTERNAL MEDICINE

## 2019-01-14 PROCEDURE — 96372 THER/PROPH/DIAG INJ SC/IM: CPT

## 2019-01-14 PROCEDURE — 36415 COLL VENOUS BLD VENIPUNCTURE: CPT | Performed by: EMERGENCY MEDICINE

## 2019-01-14 PROCEDURE — 96365 THER/PROPH/DIAG IV INF INIT: CPT

## 2019-01-14 PROCEDURE — 96368 THER/DIAG CONCURRENT INF: CPT

## 2019-01-14 PROCEDURE — 80053 COMPREHEN METABOLIC PANEL: CPT | Performed by: EMERGENCY MEDICINE

## 2019-01-14 PROCEDURE — 81001 URINALYSIS AUTO W/SCOPE: CPT | Performed by: EMERGENCY MEDICINE

## 2019-01-14 PROCEDURE — 74177 CT ABD & PELVIS W/CONTRAST: CPT

## 2019-01-14 PROCEDURE — 85610 PROTHROMBIN TIME: CPT | Performed by: EMERGENCY MEDICINE

## 2019-01-14 PROCEDURE — 84145 PROCALCITONIN (PCT): CPT | Performed by: EMERGENCY MEDICINE

## 2019-01-14 PROCEDURE — 99285 EMERGENCY DEPT VISIT HI MDM: CPT

## 2019-01-14 PROCEDURE — 94760 N-INVAS EAR/PLS OXIMETRY 1: CPT

## 2019-01-14 PROCEDURE — 83605 ASSAY OF LACTIC ACID: CPT | Performed by: INTERNAL MEDICINE

## 2019-01-14 PROCEDURE — 80184 ASSAY OF PHENOBARBITAL: CPT | Performed by: INTERNAL MEDICINE

## 2019-01-14 PROCEDURE — 82805 BLOOD GASES W/O2 SATURATION: CPT | Performed by: EMERGENCY MEDICINE

## 2019-01-14 PROCEDURE — 80048 BASIC METABOLIC PNL TOTAL CA: CPT | Performed by: INTERNAL MEDICINE

## 2019-01-14 PROCEDURE — 83735 ASSAY OF MAGNESIUM: CPT | Performed by: EMERGENCY MEDICINE

## 2019-01-14 PROCEDURE — 87040 BLOOD CULTURE FOR BACTERIA: CPT | Performed by: EMERGENCY MEDICINE

## 2019-01-14 PROCEDURE — 96375 TX/PRO/DX INJ NEW DRUG ADDON: CPT

## 2019-01-14 PROCEDURE — 84484 ASSAY OF TROPONIN QUANT: CPT | Performed by: EMERGENCY MEDICINE

## 2019-01-14 PROCEDURE — 99223 1ST HOSP IP/OBS HIGH 75: CPT | Performed by: INTERNAL MEDICINE

## 2019-01-14 PROCEDURE — 85025 COMPLETE CBC W/AUTO DIFF WBC: CPT | Performed by: EMERGENCY MEDICINE

## 2019-01-14 PROCEDURE — 96366 THER/PROPH/DIAG IV INF ADDON: CPT

## 2019-01-14 PROCEDURE — 70450 CT HEAD/BRAIN W/O DYE: CPT

## 2019-01-14 PROCEDURE — 85730 THROMBOPLASTIN TIME PARTIAL: CPT | Performed by: EMERGENCY MEDICINE

## 2019-01-14 PROCEDURE — 93005 ELECTROCARDIOGRAM TRACING: CPT

## 2019-01-14 RX ORDER — LEVETIRACETAM 100 MG/ML
1000 SOLUTION ORAL EVERY 12 HOURS SCHEDULED
Status: DISCONTINUED | OUTPATIENT
Start: 2019-01-14 | End: 2019-01-17 | Stop reason: HOSPADM

## 2019-01-14 RX ORDER — ACETAMINOPHEN 325 MG/1
650 TABLET ORAL EVERY 6 HOURS PRN
Status: DISCONTINUED | OUTPATIENT
Start: 2019-01-14 | End: 2019-01-15

## 2019-01-14 RX ORDER — RUFINAMIDE 200 MG/1
1200 TABLET, FILM COATED ORAL 2 TIMES DAILY
Status: DISCONTINUED | OUTPATIENT
Start: 2019-01-14 | End: 2019-01-17 | Stop reason: HOSPADM

## 2019-01-14 RX ORDER — TOPIRAMATE 100 MG/1
200 TABLET, FILM COATED ORAL EVERY 12 HOURS SCHEDULED
Status: DISCONTINUED | OUTPATIENT
Start: 2019-01-14 | End: 2019-01-15

## 2019-01-14 RX ORDER — MIDAZOLAM HYDROCHLORIDE 1 MG/ML
4 INJECTION INTRAMUSCULAR; INTRAVENOUS ONCE
Status: DISCONTINUED | OUTPATIENT
Start: 2019-01-14 | End: 2019-01-14

## 2019-01-14 RX ORDER — LEVOCARNITINE 1 G/10ML
750 SOLUTION ORAL 3 TIMES DAILY
Status: DISCONTINUED | OUTPATIENT
Start: 2019-01-14 | End: 2019-01-17 | Stop reason: HOSPADM

## 2019-01-14 RX ORDER — AMOXICILLIN 250 MG
1 CAPSULE ORAL DAILY
Status: DISCONTINUED | OUTPATIENT
Start: 2019-01-15 | End: 2019-01-15

## 2019-01-14 RX ORDER — CEFEPIME HYDROCHLORIDE 2 G/50ML
2000 INJECTION, SOLUTION INTRAVENOUS ONCE
Status: DISCONTINUED | OUTPATIENT
Start: 2019-01-14 | End: 2019-01-14

## 2019-01-14 RX ORDER — SODIUM CHLORIDE 9 MG/ML
75 INJECTION, SOLUTION INTRAVENOUS ONCE
Status: COMPLETED | OUTPATIENT
Start: 2019-01-14 | End: 2019-01-14

## 2019-01-14 RX ORDER — LACTULOSE 20 G/30ML
30 SOLUTION ORAL 3 TIMES DAILY
Status: DISCONTINUED | OUTPATIENT
Start: 2019-01-14 | End: 2019-01-16

## 2019-01-14 RX ORDER — MULTIVIT WITH IRON,MINERALS
5 LIQUID (ML) ORAL DAILY
Status: DISCONTINUED | OUTPATIENT
Start: 2019-01-15 | End: 2019-01-17 | Stop reason: HOSPADM

## 2019-01-14 RX ORDER — MIDAZOLAM HYDROCHLORIDE 1 MG/ML
4 INJECTION INTRAMUSCULAR; INTRAVENOUS ONCE
Status: COMPLETED | OUTPATIENT
Start: 2019-01-14 | End: 2019-01-14

## 2019-01-14 RX ORDER — LACTULOSE 20 G/30ML
20 SOLUTION ORAL ONCE
Status: COMPLETED | OUTPATIENT
Start: 2019-01-14 | End: 2019-01-14

## 2019-01-14 RX ORDER — PHENOBARBITAL 20 MG/5ML
20 ELIXIR ORAL
Status: DISCONTINUED | OUTPATIENT
Start: 2019-01-14 | End: 2019-01-15

## 2019-01-14 RX ORDER — LANOLIN ALCOHOL/MO/W.PET/CERES
6 CREAM (GRAM) TOPICAL
Status: DISCONTINUED | OUTPATIENT
Start: 2019-01-14 | End: 2019-01-15

## 2019-01-14 RX ORDER — VALPROIC ACID 250 MG/5ML
250 SOLUTION ORAL EVERY 8 HOURS SCHEDULED
Status: DISCONTINUED | OUTPATIENT
Start: 2019-01-14 | End: 2019-01-17 | Stop reason: HOSPADM

## 2019-01-14 RX ADMIN — LACTULOSE 20 G: 10 SOLUTION ORAL at 11:56

## 2019-01-14 RX ADMIN — MELATONIN TAB 3 MG 6 MG: 3 TAB at 23:16

## 2019-01-14 RX ADMIN — SODIUM CHLORIDE, SODIUM LACTATE, POTASSIUM CHLORIDE, AND CALCIUM CHLORIDE 1000 ML: .6; .31; .03; .02 INJECTION, SOLUTION INTRAVENOUS at 12:24

## 2019-01-14 RX ADMIN — SODIUM CHLORIDE, SODIUM LACTATE, POTASSIUM CHLORIDE, AND CALCIUM CHLORIDE 1000 ML: .6; .31; .03; .02 INJECTION, SOLUTION INTRAVENOUS at 14:42

## 2019-01-14 RX ADMIN — LEVETIRACETAM 1000 MG: 100 SOLUTION ORAL at 22:26

## 2019-01-14 RX ADMIN — SODIUM CHLORIDE 75 ML/HR: 0.9 INJECTION, SOLUTION INTRAVENOUS at 17:48

## 2019-01-14 RX ADMIN — VALPROIC ACID 250 MG: 500 SOLUTION ORAL at 22:28

## 2019-01-14 RX ADMIN — TOPIRAMATE 200 MG: 100 TABLET, FILM COATED ORAL at 22:26

## 2019-01-14 RX ADMIN — LEVOCARNITINE 750 MG: 1 SOLUTION ORAL at 17:57

## 2019-01-14 RX ADMIN — ENOXAPARIN SODIUM 40 MG: 40 INJECTION SUBCUTANEOUS at 17:57

## 2019-01-14 RX ADMIN — MIDAZOLAM HYDROCHLORIDE 4 MG: 1 INJECTION, SOLUTION INTRAMUSCULAR; INTRAVENOUS at 10:32

## 2019-01-14 RX ADMIN — VANCOMYCIN HYDROCHLORIDE 750 MG: 750 INJECTION, POWDER, LYOPHILIZED, FOR SOLUTION INTRAVENOUS at 11:39

## 2019-01-14 RX ADMIN — CEFEPIME 2000 MG: 2 INJECTION, POWDER, FOR SOLUTION INTRAVENOUS at 12:25

## 2019-01-14 RX ADMIN — IOHEXOL 90 ML: 350 INJECTION, SOLUTION INTRAVENOUS at 14:01

## 2019-01-14 RX ADMIN — LACTULOSE 30 G: 10 SOLUTION ORAL at 22:29

## 2019-01-14 RX ADMIN — LEVOCARNITINE 750 MG: 1 SOLUTION ORAL at 22:26

## 2019-01-14 RX ADMIN — PHENOBARBITAL 20 MG: 20 LIQUID ORAL at 22:26

## 2019-01-14 RX ADMIN — RUFINAMIDE 1200 MG: 200 TABLET, FILM COATED ORAL at 17:57

## 2019-01-14 RX ADMIN — LEVETIRACETAM 2000 MG: 100 INJECTION, SOLUTION, CONCENTRATE INTRAVENOUS at 11:14

## 2019-01-14 RX ADMIN — VALPROIC ACID 250 MG: 500 SOLUTION ORAL at 17:58

## 2019-01-14 RX ADMIN — LACTULOSE 30 G: 10 SOLUTION ORAL at 17:58

## 2019-01-14 RX ADMIN — SODIUM CHLORIDE 1000 ML: 0.9 INJECTION, SOLUTION INTRAVENOUS at 11:13

## 2019-01-14 NOTE — H&P
H&P- Srinivasa Bhardwaj [id number] 1980, 41 y o  female MRN: [id number]    Unit/Bed#:  Encounter: [id number]    Primary Care Provider: Martín Kyle DO   Date and time admitted to hospital: 1/14/2019 10:30 AM        * Intractable epilepsy without status epilepticus [facility])   Assessment & Plan    Patient has known seizure disorder, I have discussed the case several times with her primary epileptologist     Patient will be continued on her complex regimen, will monitor the patient for at least 2 midnights  At this point I do not suspect any underlying infection, will monitor off of any further antibiotics and follow up cultures  Patient's respiratory status is stable and at baseline, she is currently satting 100% on room air  Metabolic acidosis, increased anion gap (IAG)   Assessment & Plan    Likely secondary to seizure     Lactic acidosis   Assessment & Plan    Likely secondary to seizure activity, will trend, give IV fluid  Lennox-Gastaut syndrome with tonic seizures (HCC)   Assessment & Plan    As above, chronic seizure disorder     Hypotension   Assessment & Plan    Patient's blood pressure is low normal which is her baseline blood pressure     Hyperammonemia (HCC)   Assessment & Plan    Chronic, continue medical management     Intellectual disability   Assessment & Plan    Patient appears to be at baseline mental status     Underweight   Assessment & Plan    Consult Nutrition, patient will be NPO tonight, if no further seizure activity can resume tube feeds tomorrow     Transaminitis   Assessment & Plan    Chronic, continue medical management           VTE Prophylaxis: Enoxaparin (Lovenox)  / sequential compression device   Code Status:  Full code  POLST: There is no POLST form on file for this patient (pre-hospital)    Anticipated Length of Stay:  Patient will be admitted on an Inpatient basis with an anticipated length of stay of  greater than 2 midnights     Justification for Hospital Stay:  Complex seizure history, patient will need ongoing inpatient monitoring      Chief Complaint:   Seizure activity    History of Present Illness:    Donald Villalobos is a 41 y o  female who presents with seizure activity from nursing home  Patient cannot supply any history, history is obtained by chart review, ER note reviewed as quoted below     "60-year-old woman with PMH as noted presents to emergency department from nursing home for evaluation of worsening seizure activity this morning  Per nursing report, patient had first seizure at 0900 this morning consisting of localized unilateral upper extremity shaking with subsequent generalization to full tonic/clonic seizures  Each episode lasted up to 90 sec at a time with interictal period patient remaining somewhat agitated and confused with acute alteration from her baseline  Was reportedly at baseline state of health yesterday  Has had no recent changes in medications per nursing home  Has longstanding history of seizure disorders multiple ED visits for seizure as well as sepsis from multiple sources  Report taken from EMS/nursing home additional information catheter medical records as patient is unable to provide any history; she is nonverbal at baseline secondary to developmental disability  Patient not having active tonic/clonic activity at baseline but she did not receive any medications from EMS secondary to being transported by BLS crew nor from the nursing home by their report    She was therefore given midazolam 4 mg IM and IV access established  "      Review of Systems:    Review of Systems   Unable to perform ROS: Patient nonverbal       Past Medical and Surgical History:     Past Medical History:   Diagnosis Date    ADHD     [facility] [address] )     Autistic disorder     Hyperammonemia (HCC)     Hyperkeratosis     Hypotension     Intellectual disability     Intellectual disability     Lennox-Gastaut syndrome with tonic seizures ([facility] [address] )     Lethargy     Liver enzyme elevation     Onychomycosis     Osteoporosis     Osteoporosis     Psychiatric disorder     anxiety       Past Surgical History:   Procedure Laterality Date    ABDOMINAL SURGERY      CARDIAC PACEMAKER PLACEMENT      IR THORACENTESIS  12/17/2018    JEJUNOSTOMY FEEDING TUBE      PEG TUBE PLACEMENT         Meds/Allergies:    Prior to Admission medications    Medication Sig Start Date End Date Taking? Authorizing Provider   enoxaparin (LOVENOX) 40 mg/0 4 mL Inject 0 4 mL (40 mg total) under the skin every 24 hours 10/13/18   Latoya Downing DO   ibuprofen (MOTRIN) 200 mg tablet by Per G Tube route every 6 (six) hours as needed for mild pain    Historical Provider, MD   lactulose 20 g/30 mL 45 mL (30 g total) by Per G Tube route 3 (three) times a day 10/13/18   Latoya Downing DO   levETIRAcetam (KEPPRA) 100 mg/mL oral solution 10 mL (1,000 mg total) by Per G Tube route every 12 (twelve) hours for 30 days 9/4/18 1/10/19  Haily Zarco MD   levOCARNitine (CARNITOR) 1 g/10 mL solution 7 5 mL (750 mg total) by Per G Tube route 3 (three) times a day 9/4/18   Haily Zarco MD   MELATONIN PO 6 mg by Per G Tube route daily at bedtime    Historical Provider, MD   Multiple Vitamins-Minerals (MULTIVITAMIN WITH IRON-MINERALS) liquid 5 mL by Per G Tube route daily    Historical Provider, MD   ondansetron (ZOFRAN) 4 mg tablet 4 mg by Per G Tube route every 8 (eight) hours as needed for nausea or vomiting      Historical Provider, MD   perampanel [facility]) oral suspension 20 mL (10 mg total) by Per OG Tube route daily 1/10/19   Haily Zarco MD   PHENobarbital 20 mg/5 mL elixir Take 5 mL (20 mg total) by mouth daily at bedtime for 14 days Then stop this medication   1/10/19 1/24/19  Haily Zarco MD   Probiotic Product (ALEXANDER-BID PROBIOTIC PO) 1 tablet by Per G Tube route daily      Historical Provider, MD   rufinamide (BANZEL) 400 mg tablet 3 tablets (1,200 mg total) by Per G Tube route 2 (two) times a day 9/4/18   Jared Darden MD   Sennosides-Docusate Sodium (SENEXON-S PO) 1 tablet by Per G Tube route daily      Historical Provider, MD   topiramate (TOPAMAX) 200 MG tablet Take 1 tablet (200 mg total) by mouth every 12 (twelve) hours 9/4/18   Jared Darden MD   valproic acid (DEPAKENE) 250 MG/5ML soln 5 mL (250 mg total) by Per G Tube route every 8 (eight) hours 9/4/18   Jared Darden MD     I have reveiwed home medications using records provided by [facility]  Allergies: Allergies   Allergen Reactions    Felbamate        Social History:     Marital Status: Single   Substance Use History:   History   Alcohol Use No     History   Smoking Status    Never Smoker   Smokeless Tobacco    Never Used     History   Drug Use No       Family History:    non-contributory    Physical Exam:     Vitals:   Blood Pressure: 98/61 (01/14/19 1515)  Pulse: 84 (01/14/19 1515)  Temperature: 100 3 °F (37 9 °C) (01/14/19 1033)  Temp Source: Rectal (01/14/19 1033)  Respirations: 16 (01/14/19 1515)  Height: 5' (152 4 cm) (01/14/19 1043)  Weight - Scale: 46 3 kg (102 lb 1 2 oz) (01/14/19 1043)  SpO2: 100 % (01/14/19 1515)    Physical Exam   Constitutional: She appears well-developed and well-nourished  No distress  Cardiovascular: Normal rate, regular rhythm and intact distal pulses  No murmur heard  Pulmonary/Chest: Effort normal and breath sounds normal  No respiratory distress  She has no wheezes  Abdominal: Soft  Bowel sounds are normal  She exhibits no distension  There is no tenderness  Musculoskeletal: She exhibits no edema, tenderness or deformity  Neurological: She is alert  Skin: Skin is warm and dry  No rash noted  She is not diaphoretic  No erythema  No pallor  Psychiatric: She has a normal mood and affect  Her behavior is normal  Judgment and thought content normal    Nursing note and vitals reviewed  Additional Data:     Lab Results: I have personally reviewed pertinent reports  Results from last 7 days  Lab Units 01/14/19  1041   WBC Thousand/uL 9 24   HEMOGLOBIN g/dL 14 2   HEMATOCRIT % 45 1   PLATELETS Thousands/uL 285   NEUTROS PCT % 75   LYMPHS PCT % 19   MONOS PCT % 6   EOS PCT % 0       Results from last 7 days  Lab Units 01/14/19  1049   POTASSIUM mmol/L 4 2   CHLORIDE mmol/L 106   CO2 mmol/L 18*   BUN mg/dL 22   CREATININE mg/dL 0 58*   CALCIUM mg/dL 10 0   ALK PHOS U/L 159*   ALT U/L 88*   AST U/L 162*       Results from last 7 days  Lab Units 01/14/19  1041   INR  1 27*       Imaging: I have personally reviewed pertinent reports  Xr Chest Portable    Result Date: 12/20/2018  Narrative: CHEST INDICATION:   ETT position adjustment  COMPARISON:  12/19/2018 EXAM PERFORMED/VIEWS:  XR CHEST PORTABLE FINDINGS:  ET tube has been repositioned and now projects within the mid trachea  Central line and basal neurostimulator unchanged  Cardiomediastinal silhouette appears unremarkable  Persistent perihilar and basilar opacities with suggested small basilar effusions  The upper lung fields are clear  No pneumothorax  Osseous structures appear within normal limits for patient age  Impression: Repositioned ET tube now projects within the mid trachea  Stable perihilar and basilar opacities with suspected small effusions  Workstation performed: [id number]     Xr Chest Portable    Result Date: 12/19/2018  Narrative: CHEST INDICATION:   Acute respiratory failure  COMPARISON:  None EXAM PERFORMED/VIEWS:  XR CHEST PORTABLE FINDINGS:  Right IJ catheter tip projects over the cavoatrial junction  Endotracheal tube tip projects appropriately at the mid thoracic trachea  Persistent consolidations in both lower lobes  Right costophrenic angle appears relatively sharply defined  Persistent small left pleural effusion  Heart is partially obscured but grossly unchanged  No pulmonary vascular cephalization  Bones are unremarkable       Impression: Bilateral lower lobe consolidations are unchanged  Differential considerations include multisegmental atelectasis or multifocal pneumonia  Small left pleural effusion  Workstation performed: [id number]     Xr Chest Portable    Result Date: 12/18/2018  Narrative: CHEST INDICATION:   pneumonia  COMPARISON:  Chest radiograph 12/17/2018 EXAM PERFORMED/VIEWS:  XR CHEST PORTABLE FINDINGS:  There is a right IJ catheter with tip in the region of the right atrium  Endotracheal tube with tip approximately 1 5 cm above the gene  There is a battery pack overlying the left lung apex  Cardiomediastinal silhouette appears unchanged  Patchy bilateral airspace opacities and bilateral costophrenic angle blunting  No pneumothorax  Osseous structures appear within normal limits for patient age  Impression: 1  Patchy bilateral airspace opacities suspicious for pulmonary edema versus multifocal infection  2   Bilateral pleural effusions  3   Endotracheal tube tip is approximately 1 5 cm above the gene  Retraction approximately 2 cm could be considered if clinically warranted  Workstation performed: [id number]     Xr Chest Portable    Result Date: 12/17/2018  Narrative: CHEST INDICATION:   Intubated follow up  COMPARISON:  12/15/2018 EXAM PERFORMED/VIEWS:  XR CHEST PORTABLE FINDINGS:  ET tube tip 2 8 cm with gene  OG tube tip and side-port in stomach  Right IJ catheter tip at mid SVC  Cardiomediastinal silhouette appears unremarkable  Bilateral pleural effusions, greater on left, with bibasilar opacities Osseous structures appear within normal limits for patient age  Impression: Bilateral pleural effusions with adjacent basal opacities, pleural effusion component has increased  The study was marked in [facility] for immediate notification  Workstation performed: [id number]     Xr Chest Portable    Result Date: 12/16/2018  Narrative: CHEST INDICATION:   CVC verification   COMPARISON:  December 15, 2018, at 0954 hours EXAM PERFORMED/VIEWS:  XR CHEST PORTABLE 1 FINDINGS:  The tip of the new right internal jugular line overlies the right mainstem bronchus  This would correlate with a position in the superior vena cava  Endotracheal and nasogastric tubes appear similar  The stimulator is not seen to good advantage  Cardiomediastinal silhouette appears unremarkable  Again, there is abnormal density in the retrocardiac lung  This is similar to previous Osseous structures appear within normal limits for patient age  Impression: No complication after line placement Workstation performed: [id number]     Ct Head Without Contrast    Result Date: 1/14/2019  Narrative: CT BRAIN - WITHOUT CONTRAST INDICATION:   Known seizure disorder with worsening seizures today  COMPARISON:  9/7/2018 TECHNIQUE:  CT examination of the brain was performed  In addition to axial images, coronal 2D reformatted images were created and submitted for interpretation  Radiation dose length product (DLP) for this visit:  905 95 mGy-cm   This examination, like all CT scans performed in the [facility], was performed utilizing techniques to minimize radiation dose exposure, including the use of iterative  reconstruction and automated exposure control  IMAGE QUALITY:  Diagnostic  FINDINGS: PARENCHYMA:  No intracranial mass, mass effect or midline shift  No CT signs of acute infarction  No acute parenchymal hemorrhage  VENTRICLES AND EXTRA-AXIAL SPACES:  Normal for the patient's age  VISUALIZED ORBITS AND PARANASAL SINUSES:  Chronic mucoperiosteal thickening in the right maxillary sinus  No acute air-fluid levels or mastoid effusion  Normal orbits  CALVARIUM AND EXTRACRANIAL SOFT TISSUES:  Normal      Impression: Chronic maxillary sinus mucosal disease  Otherwise, normal head CT  Workstation performed: [id number]     Ir Thoracentesis    Result Date: 12/19/2018  Narrative: EXAMINATION: Thoracentesis with sonographic guidance   HISTORY: Pleural effusion, dyspnea, left-sided effusion and infiltrate TECHNIQUE: The patient was brought to Radiology  Informed consent was obtained  The left lower back was prepped and draped in the usual sterile fashion  Timeout was performed  Lidocaine was given as local anesthesia  Using sonographic guidance, a 6 [place] centesis catheter was advanced into the fluid  The catheter was connected to a vacuum bottle  The fluid was drained in its entirety  A total volume of 600 cc of transparent appearing, Yellow colored fluid was removed  Specimens were sent to the laboratory for evaluation  Impression: Technically successful ultrasound-guided thoracentesis  This is the end of the clinically relevant portion of this report  Subsequent information is for compliance, documentation, and coding purposes  In the process of informed consent, information was communicated, verbally, to the patient regarding the procedure  The patient was informed of; the name of the procedure, nature of the procedure, nature of the condition, risks, benefits, alternatives, and potential complications, as well as the consequences of not having the procedure  All the patient's questions were answered  Informed consent was signed  Quoted risks include bleeding, and pneumothorax, including axial vacuo pneumothorax  Chlorhexidine and alcohol was used for cleansing and sterile preparation of the skin  This was allowed to dry prior to the application of sterile draping  Timeout was performed, with all team members present, and in agreement  Confirmation of patient, procedure, laterally, allergies, and all needed equipment was performed  PROCEDURE: Thoracentesis PREPROCEDURE DIAGNOSIS: Please see "history ", above POSTPROCEDURE DIAGNOSIS: Same ANTIBIOTICS: None SPECIMEN: Pleural fluid ESTIMATED BLOOD LOSS: None ANESTHESIA: Local Ultrasound was used to evaluate the pleural fluid as a potential access site  Static and real-time images of needle entry were obtained, and archived  Workstation performed: [id number]     Ct Chest Abdomen Pelvis W Contrast    Result Date: 1/14/2019  Narrative: CT CHEST, ABDOMEN AND PELVIS WITH IV CONTRAST INDICATION:   Febrile with worsening seizures  COMPARISON:  CT of the chest, abdomen, and pelvis dated 12/15/2018  TECHNIQUE: CT examination of the chest, abdomen and pelvis was performed  Axial, sagittal, and coronal 2D reformatted images were created from the source data and submitted for interpretation  Radiation dose length product (DLP) for this visit:  413 72 mGy-cm   This examination, like all CT scans performed in the [facility], was performed utilizing techniques to minimize radiation dose exposure, including the use of iterative  reconstruction and automated exposure control  IV Contrast:  90 mL of iohexol (OMNIPAQUE) Enteric Contrast: Enteric contrast was administered  FINDINGS: CHEST LUNGS:  Right lung is clear except for minimal dependent atelectasis in the lower lobe  Patchy groundglass and nodular opacities in the superior segment of the left lower lobe  Dense consolidation seen in December have largely resolved  No lobar consolidations  No endobronchial lesions  PLEURA:  Unremarkable  HEART/GREAT VESSELS:  Unremarkable for patient's age  MEDIASTINUM AND [place]:  Unremarkable  CHEST WALL AND LOWER NECK: 13 mm nodule in the left thyroid lobe --unchanged from priors  Incidental discovery of one or more thyroid nodule(s) measuring less than 1 5 cm and without suspicious features is noted in this patient who is above 28years old; according to guidelines published in the February 2015 white paper on incidental thyroid nodules in the Journal of the [employer] of Radiology [place]), no further evaluation is recommended  12 mm hyperdense mass in the right breast upper-outer periareolar region at mid depth  The finding shows well-circumscribed margins    No abnormality around the patient's implantable vagal nerve stimulator device  ABDOMEN LIVER/BILIARY TREE:  Unremarkable  GALLBLADDER:  No calcified gallstones  No pericholecystic inflammatory change  SPLEEN:  Unremarkable  PANCREAS:  Unremarkable  ADRENAL GLANDS:  Unremarkable  KIDNEYS/URETERS:  Unremarkable  No hydronephrosis  STOMACH AND BOWEL:  Percutaneous gastrostomy tube with tip in region balloon appropriately contained within the gastric lumen  No dilated or inflamed loops of small bowel  It is abnormal for air-fluid levels to extend beyond the splenic flexure the colon, but this pattern is seen in the patient  No pericolonic stranding  APPENDIX:  No findings to suggest appendicitis  ABDOMINOPELVIC CAVITY:  No ascites or free intraperitoneal air  No lymphadenopathy  VESSELS:  Unremarkable for patient's age  PELVIS REPRODUCTIVE ORGANS:  Uterus appears unremarkable  There is no suspicious adnexal mass  URINARY BLADDER:  Unremarkable  ABDOMINAL WALL/INGUINAL REGIONS:  Changes from prior gastrostomy  Gas in the subcutaneous tissues attributable to medication administration  OSSEOUS STRUCTURES:  No acute fracture or destructive osseous lesion  Impression: 1  Admixed groundglass and nodular opacities in the superior segment left lower lobe  This could represent lingering sequelae of the largely resolved pneumonia seen in December or a new focal atypical infectious process  2   Well-circumscribed right upper-outer breast mass measuring 12 mm  Recommend further evaluation with diagnostic mammogram  3   Presence of abnormal air-fluid levels in the distal colon beyond the splenic flexure  No madai colonic wall thickening or surrounding inflammation  This pattern can be seen with viral enterocolitis, ileus, or stimulant laxative use  Note: Imaging follow-up reminder notification was scheduled in the electronic medical record  Workstation performed: [id number]     Xr Chest Portable Icu    Result Date: 12/20/2018  Narrative: CHEST INDICATION:   ET tube placement  COMPARISON:  12/19/2018 EXAM PERFORMED/VIEWS:  XR CHEST PORTABLE ICU FINDINGS:  Left chest wall vagal nerve stimulator with lead extending into the left neck, unchanged  ET tube projects just below the thoracic inlet at the level of the clavicular heads  Right internal jugular central line extends to the cavoatrial junction  The heart is not enlarged  Mild central pulmonary vascular congestion with perihilar and lower lobe opacities and small basilar effusions  Findings appear slightly improved compared to the previous examination  No pneumothorax  The upper lung fields are clear  Osseous structures appear within normal limits for patient age  Impression: Perihilar and basilar opacity slightly improving compared to the prior examination may represent improving edema or pneumonia  Workstation performed: [id number]       Allscripts / Epic Records Reviewed: Yes     ** Please Note: This note has been constructed using a voice recognition system   **

## 2019-01-14 NOTE — ASSESSMENT & PLAN NOTE
Patient has known seizure disorder, I have discussed the case several times with her primary epileptologist     Patient will be continued on her complex regimen, will monitor the patient for at least 2 midnights  At this point I do not suspect any underlying infection, will monitor off of any further antibiotics and follow up cultures  Patient's respiratory status is stable and at baseline, she is currently satting 100% on room air

## 2019-01-14 NOTE — ASSESSMENT & PLAN NOTE
Consult Nutrition, patient will be NPO tonight, if no further seizure activity can resume tube feeds tomorrow

## 2019-01-14 NOTE — RESPIRATORY THERAPY NOTE
RT Protocol Note  Paz Gary 41 y o  female MRN: [id number]  Unit/Bed#:  Encounter: [id number]    Assessment    Principal Problem:    Intractable epilepsy without status epilepticus ([facility] [address] )  Active Problems:    Lennox-Gastaut syndrome with tonic seizures (HCC)    Lactic acidosis    Underweight    Intellectual disability    Hyperammonemia (HCC)    Transaminitis    Hypotension    Metabolic acidosis, increased anion gap (IAG)      Home Pulmonary Medications:  none       Past Medical History:   Diagnosis Date    ADHD     Anoxic brain damage (HCC)     Autistic disorder     Hyperammonemia (HCC)     Hyperkeratosis     Hypotension     Intellectual disability     Intellectual disability     Lennox-Gastaut syndrome with tonic seizures (HCC)     Lethargy     Liver enzyme elevation     Onychomycosis     Osteoporosis     Osteoporosis     Psychiatric disorder     anxiety     Social History     Social History    Marital status: Single     Spouse name: N/A    Number of children: N/A    Years of education: N/A     Social History Main Topics    Smoking status: Never Smoker    Smokeless tobacco: Never Used    Alcohol use No    Drug use: No    Sexual activity: No     Other Topics Concern    None     Social History Narrative    None       Subjective         Objective    Physical Exam:   Assessment Type: Assess only  Chest Assessment: Chest expansion symmetrical  Bilateral Breath Sounds: Diminished    Vitals:  Blood pressure 98/61, pulse 84, temperature 100 3 °F (37 9 °C), temperature source Rectal, resp  rate 16, height 5' (1 524 m), weight 46 3 kg (102 lb 1 2 oz), SpO2 97 %  Imaging and other studies: I have personally reviewed pertinent reports  Plan    Respiratory Plan: No distress/Pulmonary history          No respiratory medications needed at this time

## 2019-01-14 NOTE — ED PROVIDER NOTES
History  Chief Complaint   Patient presents with    Seizure - Prior Hx Of     Patient brought in by EMS for active seizure     26-year-old woman with PMH as noted presents to emergency department from [address] for evaluation of worsening seizure activity this morning  Per nursing report, patient had first seizure at 0900 this morning consisting of localized unilateral upper extremity shaking with subsequent generalization to full tonic/clonic seizure  Each episode lasted up to 90 sec at a time with interictal period patient remaining somewhat agitated and confused with acute alteration from her baseline--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; she is nonverbal but usually awake and at least partially attentive to environmental stimuli  Was reportedly at baseline state of health yesterday  Has had no recent changes in medications per nursing home--review of records however indicates recent addition of phenobarbital for seizure control with subseuqent weaning from this agent as patient was much more lethargic when this was being administered  Has longstanding history of seizure disorders (Lennox-Gaustaux syndrome+epilepsy) with multiple ED visits for seizure as well as sepsis from multiple sources (frequently aspiration pneumonia)  Report taken from EMS/nursing home and additional information from medical records  Patient not having active tonic/clonic activity at arrival but she did not receive any medications from EMS secondary to being transported by BLS crew nor from the nursing home by their report  She was therefore given midazolam 4 mg IM during initial evaluation to assist with control of any non-convulsive SE; within the next several minutes she became visibly less agitated and somewhat more somnolent compared to her time of arrival  She did continue to protect her airway adequately  IV access established    Will pursue metabolic/infectious workup for etiology of worsening seizure control  Extensive lab workup/ekg/ct head/ct cap  Hospital admission  History provided by:  EMS personnel, medical records and nursing home  Seizure - Prior Hx Of       Prior to Admission Medications   Prescriptions Last Dose Informant Patient Reported? Taking? MELATONIN PO   Yes No   Si mg by Per G Tube route daily at bedtime   Multiple Vitamins-Minerals (MULTIVITAMIN WITH IRON-MINERALS) liquid   Yes No   Si mL by Per G Tube route daily   PHENobarbital 20 mg/5 mL elixir   No No   Sig: Take 5 mL (20 mg total) by mouth daily at bedtime for 14 days Then stop this medication     Probiotic Product (ALEXANDER-BID PROBIOTIC PO)   Yes No   Si tablet by Per G Tube route daily     Sennosides-Docusate Sodium (SENEXON-S PO)   Yes No   Si tablet by Per G Tube route daily     enoxaparin (LOVENOX) 40 mg/0 4 mL   No No   Sig: Inject 0 4 mL (40 mg total) under the skin every 24 hours   ibuprofen (MOTRIN) 200 mg tablet   Yes No   Sig: by Per G Tube route every 6 (six) hours as needed for mild pain   lactulose 20 g/30 mL   No No   Si mL (30 g total) by Per G Tube route 3 (three) times a day   levETIRAcetam (KEPPRA) 100 mg/mL oral solution   No No   Sig: 10 mL (1,000 mg total) by Per G Tube route every 12 (twelve) hours for 30 days   levOCARNitine (CARNITOR) 1 g/10 mL solution   No No   Si 5 mL (750 mg total) by Per G Tube route 3 (three) times a day   ondansetron (ZOFRAN) 4 mg tablet   Yes No   Si mg by Per G Tube route every 8 (eight) hours as needed for nausea or vomiting     perampanel (FYCOMPA) oral suspension   No No   Si mL (10 mg total) by Per OG Tube route daily   rufinamide (BANZEL) 400 mg tablet   No No   Sig: 3 tablets (1,200 mg total) by Per G Tube route 2 (two) times a day   topiramate (TOPAMAX) 200 MG tablet   No No   Sig: Take 1 tablet (200 mg total) by mouth every 12 (twelve) hours   valproic acid (DEPAKENE) 250 MG/5ML soln   No No   Si mL (250 mg total) by Per G Tube route every 8 (eight) hours      Facility-Administered Medications: None       Past Medical History:   Diagnosis Date    ADHD     Anoxic brain damage (HCC)     Autistic disorder     Hyperammonemia (HCC)     Hyperkeratosis     Hypotension     Intellectual disability     Intellectual disability     Lennox-Gastaut syndrome with tonic seizures (HCC)     Lethargy     Liver enzyme elevation     Onychomycosis     Osteoporosis     Osteoporosis     Psychiatric disorder     anxiety       Past Surgical History:   Procedure Laterality Date    ABDOMINAL SURGERY      CARDIAC PACEMAKER PLACEMENT      IR THORACENTESIS  12/17/2018    JEJUNOSTOMY FEEDING TUBE      PEG TUBE PLACEMENT         History reviewed  No pertinent family history  I have reviewed and agree with the history as documented  Social History   Substance Use Topics    Smoking status: Never Smoker    Smokeless tobacco: Never Used    Alcohol use No        Review of Systems   Unable to perform ROS: Patient nonverbal (Nonverbal at baseline; AMS 2/2 seizure activity)       Physical Exam  Physical Exam   Constitutional: She appears lethargic  She appears cachectic  She appears ill  She appears distressed  Nonverbal/developmentally disabled  Arrives mildly agitated but without tonic/clonic activity  HENT:   Head: Normocephalic and atraumatic  Right Ear: Hearing and external ear normal    Left Ear: Hearing and external ear normal    Nose: Nose normal    Mouth/Throat: Uvula is midline, oropharynx is clear and moist and mucous membranes are normal  No oropharyngeal exudate  Eyes: Pupils are equal, round, and reactive to light  Lids are normal  Right conjunctiva is injected  Left conjunctiva is injected  Neck: Trachea normal  No JVD present  No tracheal tenderness, no spinous process tenderness and no muscular tenderness present  No tracheal deviation present  No thyroid mass and no thyromegaly present     Cardiovascular: Regular rhythm, S1 normal, S2 normal, normal heart sounds and intact distal pulses  Tachycardia present  Exam reveals no gallop and no friction rub  No murmur heard  Pulses:       Radial pulses are 2+ on the right side, and 2+ on the left side  Dorsalis pedis pulses are 2+ on the right side, and 2+ on the left side  Posterior tibial pulses are 2+ on the right side, and 2+ on the left side  Pulmonary/Chest: Accessory muscle usage present  No stridor  Tachypnea (RR 22-25 on my exam) noted  No respiratory distress  She has no decreased breath sounds  She has no wheezes  She has rhonchi (diffuse rhonchi in all lung fields bilaterally)  She has no rales  She exhibits no tenderness  Abdominal: Soft  She exhibits no distension and no mass  There is no tenderness  There is no rigidity, no rebound, no guarding and no CVA tenderness  Musculoskeletal: Normal range of motion  She exhibits no edema, tenderness or deformity  Lymphadenopathy:     She has no cervical adenopathy  Neurological: She appears lethargic  GCS eye subscore is 4  GCS verbal subscore is 1  GCS motor subscore is 5  Does range all extremities with equal tone  Unable to assess sensation/orientation/coordination secondary to developmental disability and acute alteration of mental status d/t seizure  Skin: Skin is warm, dry and intact  No rash noted  She is not diaphoretic  No erythema  Psychiatric:   Unable to assess secondary to developmental disability/seizures   Nursing note and vitals reviewed        Vital Signs  ED Triage Vitals   Temperature Pulse Respirations Blood Pressure SpO2   01/14/19 1033 01/14/19 1033 01/14/19 1033 01/14/19 1033 01/14/19 1033   100 3 °F (37 9 °C) 94 18 (!) 153/107 100 %      Temp Source Heart Rate Source Patient Position - Orthostatic VS BP Location FiO2 (%)   01/14/19 1033 01/14/19 1043 01/14/19 1043 01/14/19 1043 --   Rectal Monitor Lying Left arm       Pain Score       01/14/19 1033 No Pain           Vitals:    01/15/19 0721 01/15/19 1141 01/15/19 1500 01/15/19 1858   BP: 93/61 93/65 94/60 (!) 88/65   Pulse: 70 80 85 87   Patient Position - Orthostatic VS: Lying Lying Lying Lying       Visual Acuity  Visual Acuity      Most Recent Value   L Pupil Size (mm)  5   R Pupil Size (mm)  5   L Pupil Shape  Round   R Pupil Shape  Round          ED Medications  Medications   levETIRAcetam (KEPPRA) oral solution 1,000 mg (1,000 mg Per G Tube Given 1/15/19 2022)   levOCARNitine (CARNITOR) oral solution 750 mg (750 mg Per G Tube Given 1/15/19 2023)   rufinamide (BANZEL) tablet 1,200 mg (1,200 mg Per G Tube Given 1/15/19 1004)   valproic acid (DEPAKENE) 250 MG/5ML oral soln 250 mg (250 mg Per G Tube Given 1/15/19 1434)   enoxaparin (LOVENOX) subcutaneous injection 40 mg (40 mg Subcutaneous Given 1/15/19 1630)   lactulose 20 g/30 mL oral solution 30 g (0 g Per G Tube Hold 1/15/19 2024)   multivitamin with iron-minerals liquid 5 mL (5 mL Per G Tube Given 1/15/19 1002)   Perampanel tablet 10 mg (10 mg Per G Tube Given 1/15/19 1003)   melatonin tablet 3 mg (not administered)   ondansetron (ZOFRAN) injection 4 mg (not administered)   sodium chloride 0 45 % with KCl 20 mEq/L infusion (premix) (100 mL/hr Intravenous New Bag 1/15/19 1425)   LORazepam (ATIVAN) 2 mg/mL injection 1 mg (not administered)   topiramate (TOPAMAX) tablet 250 mg (250 mg Per G Tube Given 1/15/19 2024)   PHENobarbital oral elixir 20 mg (not administered)   midazolam (VERSED) injection 4 mg (4 mg Intramuscular Given 1/14/19 1032)   vancomycin (VANCOCIN) 750 mg in sodium chloride 0 9 % 250 mL IVPB (0 mg Intravenous Stopped 1/14/19 1457)   levETIRAcetam (KEPPRA) 2,000 mg in sodium chloride 0 9 % 250 mL IVPB (2,000 mg Intravenous Given 1/14/19 1114)   sodium chloride 0 9 % bolus 1,000 mL (0 mL Intravenous Stopped 1/14/19 1224)   lactated ringers bolus 1,000 mL (0 mL Intravenous Stopped 1/14/19 1442)   lactulose 20 g/30 mL oral solution 20 g (20 g Oral Given 1/14/19 1156)   cefepime (MAXIPIME) 2,000 mg in dextrose 5 % 50 mL IVPB (0 mg Intravenous Stopped 1/14/19 1259)   lactated ringers bolus 1,000 mL (0 mL Intravenous Stopped 1/14/19 1748)   iohexol (OMNIPAQUE) 350 MG/ML injection (SINGLE-DOSE) 90 mL (90 mL Intravenous Given 1/14/19 1401)   sodium chloride 0 9 % infusion (75 mL/hr Intravenous New Bag 1/14/19 1748)   magnesium sulfate 2 g/50 mL IVPB (premix) 2 g (2 g Intravenous New Bag 1/15/19 1424)   potassium chloride 10 % oral solution 40 mEq (40 mEq Per G Tube Given 1/15/19 1424)   topiramate (TOPAMAX) tablet 50 mg (50 mg Per G Tube Given 1/15/19 1425)       Diagnostic Studies  Results Reviewed     Procedure Component Value Units Date/Time    Blood culture #1 [[id number]] Collected:  01/14/19 1041    Lab Status:  Preliminary result Specimen:  Blood from Arm, Left Updated:  01/15/19 1601     Blood Culture No Growth at 24 hrs  Blood culture #2 [[id number]] Collected:  01/14/19 1041    Lab Status:  Preliminary result Specimen:  Blood from Arm, Left Updated:  01/15/19 1601     Blood Culture No Growth at 24 hrs  Procalcitonin [[id number]]  (Normal) Collected:  01/14/19 1717    Lab Status:  Final result Specimen:  Blood from Arm, Right Updated:  01/15/19 0028     Procalcitonin <0 05 ng/ml     Lactic acid x2 [[id number]]  (Abnormal) Collected:  01/14/19 1441    Lab Status:  Final result Specimen:  Blood from Arm, Left Updated:  01/14/19 1506     LACTIC ACID 3 1 (HH) mmol/L     Narrative:         Result may be elevated if tourniquet was used during collection      Urine Microscopic [[id number]]  (Abnormal) Collected:  01/14/19 1200    Lab Status:  Final result Specimen:  Urine from Urine, Straight Cath Updated:  01/14/19 1214     RBC, UA None Seen /hpf      WBC, UA 2-4 (A) /hpf      Epithelial Cells Occasional /hpf      Bacteria, UA Occasional /hpf      MUCUS THREADS Occasional (A)    Lipase [[id number]]  (Abnormal) Collected:  01/14/19 1049    Lab Status: Final result Specimen:  Blood from Arm, Left Updated:  01/14/19 1211     Lipase 477 (H) u/L     Comprehensive metabolic panel [[id number]]  (Abnormal) Collected:  01/14/19 1049    Lab Status:  Final result Specimen:  Blood from Arm, Left Updated:  01/14/19 1211     Sodium 144 mmol/L      Potassium 4 2 mmol/L      Chloride 106 mmol/L      CO2 18 (L) mmol/L      ANION GAP 20 (H) mmol/L      BUN 22 mg/dL      Creatinine 0 58 (L) mg/dL      Glucose 197 (H) mg/dL      Calcium 10 0 mg/dL       (H) U/L      ALT 88 (H) U/L      Alkaline Phosphatase 159 (H) U/L      Total Protein 8 3 (H) g/dL      Albumin 3 4 (L) g/dL      Total Bilirubin 0 90 mg/dL      eGFR 118 ml/min/1 73sq m     Narrative:         National Kidney Disease Education Program recommendations are as follows:  GFR calculation is accurate only with a steady state creatinine  Chronic Kidney disease less than 60 ml/min/1 73 sq  meters  Kidney failure less than 15 ml/min/1 73 sq  meters      Magnesium [[id number]]  (Normal) Collected:  01/14/19 1049    Lab Status:  Final result Specimen:  Blood from Arm, Left Updated:  01/14/19 1211     Magnesium 2 0 mg/dL     UA w Reflex to Microscopic w Reflex to Culture [[id number]]  (Abnormal) Collected:  01/14/19 1200    Lab Status:  Final result Specimen:  Urine from Urine, Straight Cath Updated:  01/14/19 1206     Color, UA Orange     Clarity, UA Clear     Specific Gravity, UA >=1 030     pH, UA 6 0     Leukocytes, UA Negative     Nitrite, UA Negative     Protein, UA Trace (A) mg/dl      Glucose, UA Negative mg/dl      Ketones, UA Negative mg/dl      Urobilinogen, UA 0 2 E U /dl      Bilirubin, UA Negative     Blood, UA Negative    Protime-INR [[id number]]  (Abnormal) Collected:  01/14/19 1041    Lab Status:  Final result Specimen:  Blood from Arm, Left Updated:  01/14/19 1143     Protime 15 3 (H) seconds      INR 1 27 (H)    APTT [[id number]]  (Abnormal) Collected:  01/14/19 1041    Lab Status:  Final result Specimen: Blood from Arm, Left Updated:  01/14/19 1143     PTT 40 (H) seconds     Lactic acid x2 [[id number]]  (Abnormal) Collected:  01/14/19 1041    Lab Status:  Final result Specimen:  Blood from Arm, Left Updated:  01/14/19 1122     LACTIC ACID 4 6 (HH) mmol/L     Narrative:         Result may be elevated if tourniquet was used during collection      Ammonia [[id number]]  (Abnormal) Collected:  01/14/19 1041    Lab Status:  Final result Specimen:  Blood from Arm, Left Updated:  01/14/19 1119     Ammonia 58 (H) umol/L     Troponin I [[id number]]  (Normal) Collected:  01/14/19 1041    Lab Status:  Final result Specimen:  Blood from Arm, Left Updated:  01/14/19 1117     Troponin I 0 02 ng/mL     Blood gas, Venous [[id number]]  (Abnormal) Collected:  01/14/19 1041    Lab Status:  Final result Specimen:  Blood from Arm, Left Updated:  01/14/19 1055     pH, Jacinto 7 363     pCO2, Jacinto 33 7 (L) mm Hg      pO2, Jacinto 83 4 (H) mm Hg      HCO3, Jacinto 18 7 (L) mmol/L      Base Excess, Jacinto -5 6 mmol/L      O2 Content, Jacinto 19 9 ml/dL      O2 HGB, VENOUS 93 8 (H) %     CBC and differential [[id number]]  (Abnormal) Collected:  01/14/19 1041    Lab Status:  Final result Specimen:  Blood from Arm, Left Updated:  01/14/19 1051     WBC 9 24 Thousand/uL      RBC 4 30 Million/uL      Hemoglobin 14 2 g/dL      Hematocrit 45 1 %       (H) fL      MCH 33 0 pg      MCHC 31 5 g/dL      RDW 13 6 %      MPV 11 6 fL      Platelets 817 Thousands/uL      nRBC 0 /100 WBCs      Neutrophils Relative 75 %      Immat GRANS % 0 %      Lymphocytes Relative 19 %      Monocytes Relative 6 %      Eosinophils Relative 0 %      Basophils Relative 0 %      Neutrophils Absolute 6 84 Thousands/µL      Immature Grans Absolute 0 03 Thousand/uL      Lymphocytes Absolute 1 74 Thousands/µL      Monocytes Absolute 0 58 Thousand/µL      Eosinophils Absolute 0 01 Thousand/µL      Basophils Absolute 0 04 Thousands/µL     Fingerstick Glucose (POCT) [[id number]]  (Abnormal) Collected: 01/14/19 1040    Lab Status:  Final result Updated:  01/14/19 1041     POC Glucose 169 (H) mg/dl                  CT chest abdomen pelvis w contrast   Final Result by Stef Santillan MD (01/14 3444)      1  Admixed groundglass and nodular opacities in the superior segment left lower lobe  This could represent lingering sequelae of the largely resolved pneumonia seen in December or a new focal atypical infectious process  2   Well-circumscribed right upper-outer breast mass measuring 12 mm  Recommend further evaluation with diagnostic mammogram       3   Presence of abnormal air-fluid levels in the distal colon beyond the splenic flexure  No madai colonic wall thickening or surrounding inflammation  This pattern can be seen with viral enterocolitis, ileus, or stimulant laxative use  Note: Imaging follow-up reminder notification was scheduled in the electronic medical record  Workstation performed: [id number]         CT head without contrast   Final Result by Stef Santillan MD (01/14 0435)      Chronic maxillary sinus mucosal disease  Otherwise, normal head CT  Workstation performed: [id number]                    Procedures  Procedures       Phone Contacts  ED Phone Contact    ED Course  ED Course as of Anthony 15 2108   Mon Jan 14, 2019   1129 1  WBC wnl   2  Hg/Hct wnl   3  Plt wnl   4  Electrolytes wnl   5  Troponin nonzero but within reference range  6  Lactic acid elevated; will give 30 ml/kg IVF bolus and recheck  This may be d/t sepsis or recent seizure  Would expect rapid normalization if due solely to seizure vs persistence/worsening if sepsis is also present  I will treat empirically for sepsis with cefepime/vancomycin; has hx of recurrent aspiration pneumonia (which would be HCAP given her residence in [address] [place] [address],[address] and frequent hospitalizations) and hx of MRSA  7  Ammonia elevated: will require lactulose dosing via OGT  Likely d/t use of valproic acid  8  VBG: pH wnl  Metabolic acidosis with compensatory respiratory alkalosis  9  INR/PTT elevated--does receive enoxaparin per medication reconciliation  1208 UA: concentrated sample with trace proteinuria  Otherwise wnl  Obtained via straight catheterization  1213 Electrolytes wnl  Elevated AG likely related to lactic acidosis  Elevated transaminases similar to prior  CT was delayed due to several critical patients who required emergent CT scan  [facility] took patient to department: was unable to complete scan d/t IV placement in hand  Will return patient to department for US-guided IV placement  Patient has remained consistently calm but generally awake and attentive to environmental stimuli throughout ED course  This appears improved compared to time of arrival and closer to patient's baseline mental status as described by several ED staff who are familiar with her from previous ED presentations  No recurrent seizure activity at any time  1338 Skin prepared using Chloraprep  A candidate vein in the R brachial region was identified with use of linear high-frequency probe  Using ultrasound guidance (CPT code 58940), an 18G peripheral IV long angiocatheter was placed successfully by physician secondary to difficulty placing IV by nursing staff  Catheter was visualized within the vessel lumen      Two attempts  Good blood return  Good palpable flush  Adhered to skin using Tegederm  Patient tolerated procedure well without complication  1414 CT head results noted: no acute abnormalities present  CT cap completed and awaiting interpretation  1433 CT cap results noted and reviewed  Possible LLL infectious source; dilated bowel loops present with several potential etiologies noted  No definitive infectious etiology identified although pulmonary source is considered possible d/t findings noted on CT, rhonchi on exam, and hx of frequent/recurrent aspiration pneumonia  Ashely Dull Dr Milton Ochoa  1450 Accepted to ICU by Dr Bhavna Flores  MDM  Number of Diagnoses or Management Options  Breakthrough seizure ([facility] ): established and worsening  Elevated lactic acid level: new and requires workup  Sepsis [facility]): new and requires workup  Transaminasemia: established and improving     Amount and/or Complexity of Data Reviewed  Clinical lab tests: ordered and reviewed  Tests in the radiology section of CPT®: ordered and reviewed  Decide to obtain previous medical records or to obtain history from someone other than the patient: yes  Obtain history from someone other than the patient: yes  Review and summarize past medical records: yes  Discuss the patient with other providers: yes  Independent visualization of images, tracings, or specimens: yes    Risk of Complications, Morbidity, and/or Mortality  Presenting problems: high  Diagnostic procedures: high  Management options: high    Patient Progress  Patient progress: improved    The patient presented with a condition in which there was a high probability of imminent or life-threatening deterioration, and critical care services (excluding separately billable procedures) totalled  minutes          Disposition  Final diagnoses:   Breakthrough seizure ([facility] )   Elevated lactic acid level   Sepsis ([facility] )   Transaminasemia     Time reflects when diagnosis was documented in both MDM as applicable and the Disposition within this note     Time User Action Codes Description Comment    1/14/2019  2:59 PM Nura Beams Add [Z26 312] Breakthrough seizure ([facility] )     1/14/2019  2:59 PM Nura Beams Add [R79 89] Elevated lactic acid level     1/14/2019  2:59 PM Nura Beams Add [A41 9] Sepsis ([facility] )     1/14/2019  3:00 PM Nura Beams Add [R74 0] Transaminasemia     1/15/2019  8:03 AM Christine Gaston Add [G40 812] Lennox-Gastaut syndrome with tonic seizures ([facility] )     1/15/2019 12:53 PM Hina Vance Add [L90 9] Skin breakdown     1/15/2019 12:53 PM Alek Prabhuelizabet Boyce Add [E88 09] Hypoalbuminemia       ED Disposition     ED Disposition Condition Comment    Admit  Case was discussed with Dr Matt Childers and the patient's admission status was agreed to be Admission Status: inpatient status to the service of Dr Matt Childers  Follow-up Information    None         Current Discharge Medication List      CONTINUE these medications which have NOT CHANGED    Details   enoxaparin (LOVENOX) 40 mg/0 4 mL Inject 0 4 mL (40 mg total) under the skin every 24 hours  Refills: 0    Comments:  For DVT Prophylaxis  Associated Diagnoses: Lennox-Gastaut syndrome with tonic seizures (HCC)      ibuprofen (MOTRIN) 200 mg tablet by Per G Tube route every 6 (six) hours as needed for mild pain      lactulose 20 g/30 mL 45 mL (30 g total) by Per G Tube route 3 (three) times a day  Refills: 0    Comments: Hold for diarrhea or loose stools  Associated Diagnoses: Lennox-Gastaut syndrome with tonic seizures (HCC)      levETIRAcetam (KEPPRA) 100 mg/mL oral solution 10 mL (1,000 mg total) by Per G Tube route every 12 (twelve) hours for 30 days  Qty: 600 mL, Refills: 5    Associated Diagnoses: Lennox-Gastaut syndrome with tonic seizures (HCC)      levOCARNitine (CARNITOR) 1 g/10 mL solution 7 5 mL (750 mg total) by Per G Tube route 3 (three) times a day  Qty: 474 mL, Refills: 7    Associated Diagnoses: Lennox-Gastaut syndrome with tonic seizures (HCC)      MELATONIN PO 6 mg by Per G Tube route daily at bedtime      Multiple Vitamins-Minerals (MULTIVITAMIN WITH IRON-MINERALS) liquid 5 mL by Per G Tube route daily      ondansetron (ZOFRAN) 4 mg tablet 4 mg by Per G Tube route every 8 (eight) hours as needed for nausea or vomiting        perampanel (FYCOMPA) oral suspension 20 mL (10 mg total) by Per OG Tube route daily  Qty: 680 mL, Refills: 5    Associated Diagnoses: Lennox-Gastaut syndrome with tonic seizures (HCC)      PHENobarbital 20 mg/5 mL elixir Take 5 mL (20 mg total) by mouth daily at bedtime for 14 days Then stop this medication  Qty: 150 mL, Refills: 0    Comments: Patient is weaning off of this medication  Associated Diagnoses: Lennox-Gastaut syndrome with tonic seizures (HCC)      Probiotic Product (ALEXANDER-BID PROBIOTIC PO) 1 tablet by Per G Tube route daily        rufinamide (BANZEL) 400 mg tablet 3 tablets (1,200 mg total) by Per G Tube route 2 (two) times a day  Qty: 180 tablet, Refills: 5    Associated Diagnoses: Lennox-Gastaut syndrome with tonic seizures (HCC)      Sennosides-Docusate Sodium (SENEXON-S PO) 1 tablet by Per G Tube route daily        topiramate (TOPAMAX) 200 MG tablet Take 1 tablet (200 mg total) by mouth every 12 (twelve) hours  Qty: 60 tablet, Refills: 5    Associated Diagnoses: Lennox-Gastaut syndrome with tonic seizures (HCC)      valproic acid (DEPAKENE) 250 MG/5ML soln 5 mL (250 mg total) by Per G Tube route every 8 (eight) hours  Qty: 450 mL, Refills: 5    Associated Diagnoses: Lennox-Gastaut syndrome with tonic seizures (HCC)           No discharge procedures on file      ED Provider  Electronically Signed by           Maame Nogueira DO  01/15/19 2114       Maame Nogueira DO  01/15/19 2116

## 2019-01-15 PROBLEM — N63.0 BREAST MASS: Status: ACTIVE | Noted: 2019-01-15

## 2019-01-15 LAB
ALBUMIN SERPL BCP-MCNC: 2.5 G/DL (ref 3.5–5)
ALP SERPL-CCNC: 121 U/L (ref 46–116)
ALT SERPL W P-5'-P-CCNC: 72 U/L (ref 12–78)
ANION GAP SERPL CALCULATED.3IONS-SCNC: 10 MMOL/L (ref 4–13)
AST SERPL W P-5'-P-CCNC: 130 U/L (ref 5–45)
BASOPHILS # BLD AUTO: 0.1 THOUSANDS/ΜL (ref 0–0.1)
BASOPHILS NFR BLD AUTO: 1 % (ref 0–1)
BILIRUB SERPL-MCNC: 0.6 MG/DL (ref 0.2–1)
BUN SERPL-MCNC: 7 MG/DL (ref 5–25)
CALCIUM SERPL-MCNC: 8.7 MG/DL (ref 8.3–10.1)
CHLORIDE SERPL-SCNC: 112 MMOL/L (ref 100–108)
CO2 SERPL-SCNC: 24 MMOL/L (ref 21–32)
CREAT SERPL-MCNC: 0.49 MG/DL (ref 0.6–1.3)
EOSINOPHIL # BLD AUTO: 0.16 THOUSAND/ΜL (ref 0–0.61)
EOSINOPHIL NFR BLD AUTO: 2 % (ref 0–6)
ERYTHROCYTE [DISTWIDTH] IN BLOOD BY AUTOMATED COUNT: 13.5 % (ref 11.6–15.1)
GFR SERPL CREATININE-BSD FRML MDRD: 125 ML/MIN/1.73SQ M
GLUCOSE SERPL-MCNC: 101 MG/DL (ref 65–140)
HCT VFR BLD AUTO: 38.7 % (ref 34.8–46.1)
HGB BLD-MCNC: 12 G/DL (ref 11.5–15.4)
IMM GRANULOCYTES # BLD AUTO: 0.03 THOUSAND/UL (ref 0–0.2)
IMM GRANULOCYTES NFR BLD AUTO: 0 % (ref 0–2)
INR PPP: 1.34 (ref 0.86–1.17)
LYMPHOCYTES # BLD AUTO: 3.2 THOUSANDS/ΜL (ref 0.6–4.47)
LYMPHOCYTES NFR BLD AUTO: 40 % (ref 14–44)
MAGNESIUM SERPL-MCNC: 1.6 MG/DL (ref 1.6–2.6)
MCH RBC QN AUTO: 33.2 PG (ref 26.8–34.3)
MCHC RBC AUTO-ENTMCNC: 31 G/DL (ref 31.4–37.4)
MCV RBC AUTO: 107 FL (ref 82–98)
MONOCYTES # BLD AUTO: 0.86 THOUSAND/ΜL (ref 0.17–1.22)
MONOCYTES NFR BLD AUTO: 11 % (ref 4–12)
NEUTROPHILS # BLD AUTO: 3.65 THOUSANDS/ΜL (ref 1.85–7.62)
NEUTS SEG NFR BLD AUTO: 46 % (ref 43–75)
NRBC BLD AUTO-RTO: 0 /100 WBCS
PHENOBARB SERPL-MCNC: 8.2 UG/ML (ref 15–40)
PHOSPHATE SERPL-MCNC: 4.5 MG/DL (ref 2.7–4.5)
PLATELET # BLD AUTO: 211 THOUSANDS/UL (ref 149–390)
PMV BLD AUTO: 11.9 FL (ref 8.9–12.7)
POTASSIUM SERPL-SCNC: 3.4 MMOL/L (ref 3.5–5.3)
PROCALCITONIN SERPL-MCNC: <0.05 NG/ML
PROT SERPL-MCNC: 6.2 G/DL (ref 6.4–8.2)
PROTHROMBIN TIME: 15.9 SECONDS (ref 11.8–14.2)
RBC # BLD AUTO: 3.61 MILLION/UL (ref 3.81–5.12)
SODIUM SERPL-SCNC: 146 MMOL/L (ref 136–145)
WBC # BLD AUTO: 8 THOUSAND/UL (ref 4.31–10.16)

## 2019-01-15 PROCEDURE — 84100 ASSAY OF PHOSPHORUS: CPT | Performed by: INTERNAL MEDICINE

## 2019-01-15 PROCEDURE — 99232 SBSQ HOSP IP/OBS MODERATE 35: CPT | Performed by: INTERNAL MEDICINE

## 2019-01-15 PROCEDURE — 83735 ASSAY OF MAGNESIUM: CPT | Performed by: INTERNAL MEDICINE

## 2019-01-15 PROCEDURE — 87147 CULTURE TYPE IMMUNOLOGIC: CPT | Performed by: INTERNAL MEDICINE

## 2019-01-15 PROCEDURE — 85610 PROTHROMBIN TIME: CPT | Performed by: INTERNAL MEDICINE

## 2019-01-15 PROCEDURE — 85025 COMPLETE CBC W/AUTO DIFF WBC: CPT | Performed by: INTERNAL MEDICINE

## 2019-01-15 PROCEDURE — 99447 NTRPROF PH1/NTRNET/EHR 11-20: CPT | Performed by: PSYCHIATRY & NEUROLOGY

## 2019-01-15 PROCEDURE — 80053 COMPREHEN METABOLIC PANEL: CPT | Performed by: INTERNAL MEDICINE

## 2019-01-15 PROCEDURE — 87633 RESP VIRUS 12-25 TARGETS: CPT | Performed by: INTERNAL MEDICINE

## 2019-01-15 PROCEDURE — 87081 CULTURE SCREEN ONLY: CPT | Performed by: INTERNAL MEDICINE

## 2019-01-15 RX ORDER — TOPIRAMATE 25 MG/1
50 TABLET ORAL ONCE
Status: COMPLETED | OUTPATIENT
Start: 2019-01-15 | End: 2019-01-15

## 2019-01-15 RX ORDER — PHENOBARBITAL 20 MG/5ML
20 ELIXIR ORAL
Status: DISCONTINUED | OUTPATIENT
Start: 2019-01-15 | End: 2019-01-17 | Stop reason: HOSPADM

## 2019-01-15 RX ORDER — SODIUM CHLORIDE 9 MG/ML
75 INJECTION, SOLUTION INTRAVENOUS CONTINUOUS
Status: DISCONTINUED | OUTPATIENT
Start: 2019-01-15 | End: 2019-01-15

## 2019-01-15 RX ORDER — ONDANSETRON 2 MG/ML
4 INJECTION INTRAMUSCULAR; INTRAVENOUS EVERY 4 HOURS PRN
Status: DISCONTINUED | OUTPATIENT
Start: 2019-01-15 | End: 2019-01-17 | Stop reason: HOSPADM

## 2019-01-15 RX ORDER — POTASSIUM CHLORIDE AND SODIUM CHLORIDE 450; 150 MG/100ML; MG/100ML
100 INJECTION, SOLUTION INTRAVENOUS CONTINUOUS
Status: DISCONTINUED | OUTPATIENT
Start: 2019-01-15 | End: 2019-01-17 | Stop reason: HOSPADM

## 2019-01-15 RX ORDER — MAGNESIUM SULFATE HEPTAHYDRATE 40 MG/ML
2 INJECTION, SOLUTION INTRAVENOUS ONCE
Status: COMPLETED | OUTPATIENT
Start: 2019-01-15 | End: 2019-01-15

## 2019-01-15 RX ORDER — LANOLIN ALCOHOL/MO/W.PET/CERES
3 CREAM (GRAM) TOPICAL
Status: DISCONTINUED | OUTPATIENT
Start: 2019-01-15 | End: 2019-01-17 | Stop reason: HOSPADM

## 2019-01-15 RX ORDER — LORAZEPAM 2 MG/ML
1 INJECTION INTRAMUSCULAR EVERY 4 HOURS PRN
Status: DISCONTINUED | OUTPATIENT
Start: 2019-01-15 | End: 2019-01-17 | Stop reason: HOSPADM

## 2019-01-15 RX ORDER — POTASSIUM CHLORIDE 20MEQ/15ML
40 LIQUID (ML) ORAL ONCE
Status: COMPLETED | OUTPATIENT
Start: 2019-01-15 | End: 2019-01-15

## 2019-01-15 RX ADMIN — PERAMPANEL 10 MG: 4 TABLET ORAL at 10:03

## 2019-01-15 RX ADMIN — MELATONIN TAB 3 MG 3 MG: 3 TAB at 21:43

## 2019-01-15 RX ADMIN — ENOXAPARIN SODIUM 40 MG: 40 INJECTION SUBCUTANEOUS at 16:30

## 2019-01-15 RX ADMIN — POTASSIUM CHLORIDE AND SODIUM CHLORIDE 100 ML/HR: 450; 150 INJECTION, SOLUTION INTRAVENOUS at 14:25

## 2019-01-15 RX ADMIN — PHENOBARBITAL 20 MG: 20 LIQUID ORAL at 21:43

## 2019-01-15 RX ADMIN — RUFINAMIDE 1200 MG: 200 TABLET, FILM COATED ORAL at 10:04

## 2019-01-15 RX ADMIN — LEVOCARNITINE 750 MG: 1 SOLUTION ORAL at 20:23

## 2019-01-15 RX ADMIN — TOPIRAMATE 250 MG: 100 TABLET, FILM COATED ORAL at 20:24

## 2019-01-15 RX ADMIN — TOPIRAMATE 50 MG: 25 TABLET, FILM COATED ORAL at 14:25

## 2019-01-15 RX ADMIN — LACTULOSE 30 G: 10 SOLUTION ORAL at 16:30

## 2019-01-15 RX ADMIN — VALPROIC ACID 250 MG: 500 SOLUTION ORAL at 14:34

## 2019-01-15 RX ADMIN — MAGNESIUM SULFATE HEPTAHYDRATE 2 G: 40 INJECTION, SOLUTION INTRAVENOUS at 14:24

## 2019-01-15 RX ADMIN — MULTIVITAMIN 5 ML: LIQUID ORAL at 10:02

## 2019-01-15 RX ADMIN — LEVETIRACETAM 1000 MG: 100 SOLUTION ORAL at 10:01

## 2019-01-15 RX ADMIN — LEVOCARNITINE 750 MG: 1 SOLUTION ORAL at 16:30

## 2019-01-15 RX ADMIN — POTASSIUM CHLORIDE AND SODIUM CHLORIDE 100 ML/HR: 450; 150 INJECTION, SOLUTION INTRAVENOUS at 23:33

## 2019-01-15 RX ADMIN — TOPIRAMATE 200 MG: 100 TABLET, FILM COATED ORAL at 10:02

## 2019-01-15 RX ADMIN — LACTULOSE 30 G: 10 SOLUTION ORAL at 10:04

## 2019-01-15 RX ADMIN — LEVETIRACETAM 1000 MG: 100 SOLUTION ORAL at 20:22

## 2019-01-15 RX ADMIN — POTASSIUM CHLORIDE 40 MEQ: 20 SOLUTION ORAL at 14:24

## 2019-01-15 RX ADMIN — VALPROIC ACID 250 MG: 500 SOLUTION ORAL at 21:44

## 2019-01-15 RX ADMIN — LEVOCARNITINE 750 MG: 1 SOLUTION ORAL at 10:03

## 2019-01-15 RX ADMIN — Medication 1 TABLET: at 10:01

## 2019-01-15 RX ADMIN — VALPROIC ACID 250 MG: 500 SOLUTION ORAL at 06:08

## 2019-01-15 RX ADMIN — RUFINAMIDE 1200 MG: 200 TABLET, FILM COATED ORAL at 21:46

## 2019-01-15 NOTE — ASSESSMENT & PLAN NOTE
· Chronic transamintis  · Likely secondary to a medication effect  · Avoid all hepatotoxic agents  · Check an acute hepatitis panel  · Follow the LFT's (liver function tests)

## 2019-01-15 NOTE — PROGRESS NOTES
Progress Note - Ralph Zaldivar 1980, 41 y o  female MRN: [id number]    Unit/Bed#:  Encounter: [id number]    Primary Care Provider: Becky Rivera DO   Date and time admitted to hospital: 1/14/2019 10:30 AM        * Lennox-Gastaut syndrome with tonic seizures (HCC)   Assessment & Plan    · With breakthrough seizure activity  · I discussed the case with Neurology  · Increase topamax to 250 mg via the PEG tube every 12 hours  · Continue her other seizure medications as prescribed  · PRN IV ativan  · If she develops additional breakthrough seizure activity, she will need transfer to a higher level of care for EMU (continuous EEG monitoring)  · Check for an infectious etiology  · Check blood cultures x 2 sets  · Check a urine culture  · Check Influenza/RSV PCR testing  · Follow the procalcitonin level  · Maintain ICU level of care  · IV fluids     Breast mass   Assessment & Plan    · Outpatient mammogram     Metabolic acidosis, increased anion gap (IAG)   Assessment & Plan    · Resolved  · Likely secondary to lactic acidosis     Hypoalbuminemia   Assessment & Plan    · Nutrition/dietitian consultation     Skin breakdown   Assessment & Plan    · Wound care team consult     Hypotension   Assessment & Plan    · IV fluids  · Follow the blood pressure trend     Transaminitis   Assessment & Plan    · Chronic transamintis  · Likely secondary to a medication effect  · Avoid all hepatotoxic agents  · Check an acute hepatitis panel  · Follow the LFT's (liver function tests)     Hypernatremia   Assessment & Plan    · IV fluids with 1/2 NSS with 20 meQ of KCl at 100 ml/hr  · Follow the sodium level     Hypokalemia   Assessment & Plan    · Replete with potassium chloride supplementation via the PEG tube and via IV means  · Follow the potassium level and magnesium level     Hyperammonemia (HCC)   Assessment & Plan    · Likely secondary to valproic acid  · Continue lactulose     Lactic acidosis   Assessment & Plan · Improved  · Likely secondary to seizure activity  · Continue IV fluids           VTE Pharmacologic Prophylaxis:   Pharmacologic: Enoxaparin (Lovenox)  Mechanical VTE Prophylaxis in Place: Yes    Patient Centered Rounds: I have performed bedside rounds with nursing staff today  Discussions with Specialists or Other Care Team Provider:  I discussed the case with Neurology  Time Spent for Care: 20 minutes  More than 50% of total time spent on counseling and coordination of care as described above  Current Length of Stay: 1 day(s)    Current Patient Status: Inpatient   Certification Statement: The patient will continue to require additional inpatient hospital stay due to the need for close neurologic status monitoring, for IV fluids, and for close monitoring of her blood pressure trend  Code Status: Level 1 - Full Code      Subjective: The patient was seen and examined  The patient is lethargic  She is non-verbal at baseline  She responds somewhat to tactile stimuli  Objective:     Vitals:   Temp (24hrs), Av 6 °F (36 4 °C), Min:96 9 °F (36 1 °C), Max:98 3 °F (36 8 °C)    Temp:  [96 9 °F (36 1 °C)-98 3 °F (36 8 °C)] 96 9 °F (36 1 °C)  HR:  [70-98] 80  Resp:  [13-36] 18  BP: ()/(52-66) 93/65  SpO2:  [95 %-100 %] 95 %  Body mass index is 19 68 kg/m²  Input and Output Summary (last 24 hours):        Intake/Output Summary (Last 24 hours) at 01/15/19 1307  Last data filed at 01/15/19 1031   Gross per 24 hour   Intake             1895 ml   Output                0 ml   Net             1895 ml       Physical Exam:     Physical Exam  General:  NAD, lethargic, responds intermittently to tactile stimuli, non-verbal at baseline  HEENT:  NC/AT, mucous membranes dry  Neck:  Supple, No JVP elevation  CV:  + S1, + S2, RRR  Pulm:  Bibasilar crackles  Abd:  Soft, Non-tender, Non-distended, PEG tube in place  Ext:  No clubbing/cyanosis/edema  Skin:  + Facial rash involving the bilateral cheeks/maxillary region      Additional Data:    Labs:      Results from last 7 days  Lab Units 01/15/19  0511   WBC Thousand/uL 8 00   HEMOGLOBIN g/dL 12 0   HEMATOCRIT % 38 7   PLATELETS Thousands/uL 211   NEUTROS PCT % 46   LYMPHS PCT % 40   MONOS PCT % 11   EOS PCT % 2       Results from last 7 days  Lab Units 01/15/19  0511   SODIUM mmol/L 146*   POTASSIUM mmol/L 3 4*   CHLORIDE mmol/L 112*   CO2 mmol/L 24   BUN mg/dL 7   CREATININE mg/dL 0 49*   ANION GAP mmol/L 10   CALCIUM mg/dL 8 7   ALBUMIN g/dL 2 5*   TOTAL BILIRUBIN mg/dL 0 60   ALK PHOS U/L 121*   ALT U/L 72   AST U/L 130*   GLUCOSE RANDOM mg/dL 101       Results from last 7 days  Lab Units 01/15/19  0511   INR  1 34*       Results from last 7 days  Lab Units 01/14/19  1040   POC GLUCOSE mg/dl 169*           Results from last 7 days  Lab Units 01/14/19  1717 01/14/19  1441 01/14/19  1041   LACTIC ACID mmol/L 2 0 3 1* 4 6*   PROCALCITONIN ng/ml <0 05  --   --            * I Have Reviewed All Lab Data Listed Above  * Additional Pertinent Lab Tests Reviewed:  [facility] Admission Reviewed      Recent Cultures (last 7 days):           Last 24 Hours Medication List:     Current Facility-Administered Medications:  enoxaparin 40 mg Subcutaneous Q24H Tristan Harvey, DO   lactulose 30 g Per G Tube TID Beckley Plants, DO   levETIRAcetam 1,000 mg Per G Tube Q12H [address] Beckley Plants, DO   levOCARNitine 750 mg Per G Tube TID Beckley Plants, DO   LORazepam 1 mg Intravenous Q4H PRN Max Lauro, DO   magnesium sulfate 2 g Intravenous Once Max Lauro, DO   melatonin 3 mg Per G Tube HS Max Lauro, DO   multivitamin with iron-minerals 5 mL Per G Tube Daily Tristan Harvey, DO   ondansetron 4 mg Intravenous Q4H PRN Max Lauro, DO   Perampanel 10 mg Per G Tube Daily Beckley Plants, DO   PHENobarbital 20 mg Per G Tube HS Max Lauro, DO   potassium chloride 40 mEq Per G Tube Once Max Lauro, DO   rufinamide 1,200 mg Per G Tube BID Lela Wheeler DO   sodium chloride 0 45 % with KCl 20 mEq/L 100 mL/hr Intravenous Continuous Sasha Farrar,    topiramate 250 mg Per G Tube Q12H [address] Sasha Farrar,    topiramate 50 mg Per G Tube Once Sasha Farrar DO   valproic acid 250 mg Per G Tube Q8H [address] Lela Wheeler DO        Today, Patient Was Seen By: Sasha Farrar DO    ** Please Note: Dictation voice to text software may have been used in the creation of this document   **

## 2019-01-15 NOTE — PLAN OF CARE
Problem: Nutrition/Hydration-ADULT  Goal: Nutrient/Hydration intake appropriate for improving, restoring or maintaining nutritional needs  Monitor and assess patient's nutrition/hydration status for malnutrition (ex- brittle hair, bruises, dry skin, pale skin and conjunctiva, muscle wasting, smooth red tongue, and disorientation)  Collaborate with interdisciplinary team and initiate plan and interventions as ordered  Monitor patient's weight and dietary intake as ordered or per policy  Utilize nutrition screening tool and intervene per policy  provide high-protein, high-caloric foods as appropriate       INTERVENTIONS:  - Monitor oral intake, urinary output, labs, and treatment plans  - Assess nutrition and hydration status and recommend course of action  -   - Assist patient with eating if necessary   -   - Recommend/ encourage appropriate diets, oral nutritional supplements, and vitamin/mineral supplements  - Order, calculate, and assess calorie counts as needed  - Recommend, monitor, and adjust tube feedings  based on assessed needs  - Assess need for intravenous fluids  - Provide specific nutrition/hydration education as appropriate  - Include patient/family/caregiver in decisions related to nutrition   Outcome: Progressing

## 2019-01-15 NOTE — TELEMEDICINE
Neurology    Spoke with Dr Olson, [facility] attending physician who requested the tele consult  Discussed case, reviewed chart, spoke with patient's primary epileptologist Dr España    Patient with LGS from remote anoxic injury- refractory epilepsy- baseline 10-12 tonic seizures/ hour in sleep with EEG monitoring  Typically when awake she is interactive to a mild degree largely non verbal and non ambulatory which is her baseline  She is typically not lethargic when awake  She has also been more lethargic recently due to phenobarb thus this was being weaned off  Current plan if patient continues to have generalized convulsive (not tonic seizure in sleep as this is baseline) seizures would be to increase topamax to 250 BID and c/w phenobarb 20 mg for 10 more days then stop this  If she continues to have recurrent seizures with no coming back to baseline, contact neurology ASAP/ transfer to [facility] for EMU monitoring  Infectious work up and repeat ammonia per primary team as she has had aspiration PNA multiple times and treat as warranted  Discussed above with Dr Olson    Total time spent with direct care/counseling speaking with physician at least 20 minutes

## 2019-01-15 NOTE — PLAN OF CARE
Problem: Potential for Falls  Goal: Patient will remain free of falls  INTERVENTIONS:  - Assess patient frequently for physical needs  -  Identify cognitive and physical deficits and behaviors that affect risk of falls    -  [place] fall precautions as indicated by assessment   - Educate patient/family on patient safety including physical limitations  - Instruct patient to call for assistance with activity based on assessment  - Modify environment to reduce risk of injury  - Consider OT/PT consult to assist with strengthening/mobility   Outcome: Progressing

## 2019-01-15 NOTE — ASSESSMENT & PLAN NOTE
· Replete with potassium chloride supplementation via the PEG tube and via IV means  · Follow the potassium level and magnesium level

## 2019-01-15 NOTE — NURSING NOTE
Pt received from ED,pt identified & being admitted into CCU,room 204;being placed on bedside monitoring

## 2019-01-15 NOTE — UTILIZATION REVIEW
Initial Clinical Review    Admission: Date/Time/Statement: 1/14/19 @ 1450 Inpatient Written   Orders Placed This Encounter   Procedures    Inpatient Admission     Standing Status:   Standing     Number of Occurrences:   1     Order Specific Question:   Admitting Physician     Answer:   Dipesh Burt     Order Specific Question:   Level of Care     Answer:   Critical Care [15]     Order Specific Question:   Estimated length of stay     Answer:   More than 2 Midnights     Order Specific Question:   Certification     Answer:   I certify that inpatient services are medically necessary for this patient for a duration of greater than two midnights  See H&P and MD Progress Notes for additional information about the patient's course of treatment  ED: Date/Time/Mode of Arrival:   ED Arrival Information     Expected Arrival Acuity Means of Arrival Escorted By Service Admission Type    - 1/14/2019 10:30 Urgent Ambulance [facility] General Medicine Urgent    Arrival Complaint    seizure        Chief Complaint:   Chief Complaint   Patient presents with    Seizure - Prior Hx Of     Patient brought in by EMS for active seizure     History of Illness:  41-year-old woman with PMH as noted presents to emergency department from nursing home for evaluation of worsening seizure activity this morning   Per nursing report, patient had first seizure at 0900 this morning consisting of localized unilateral upper extremity shaking with subsequent generalization to full tonic/clonic seizures  Each episode lasted up to 90 sec at a time with interictal period patient remaining somewhat agitated and confused with acute alteration from her baseline   Was reportedly at baseline state of health yesterday   Has had no recent changes in medications per nursing home   Has longstanding history of seizure disorders multiple ED visits for seizure as well as sepsis from multiple sources   Report taken from EMS/nursing home additional information catheter medical records as patient is unable to provide any history; she is nonverbal at baseline secondary to developmental disability      ED Vital Signs:   ED Triage Vitals   Temperature Pulse Respirations Blood Pressure SpO2   01/14/19 1033 01/14/19 1033 01/14/19 1033 01/14/19 1033 01/14/19 1033   100 3 °F (37 9 °C) 94 18 (!) 153/107 100 %      Temp Source Heart Rate Source Patient Position - Orthostatic VS BP Location FiO2 (%)   01/14/19 1033 01/14/19 1043 01/14/19 1043 01/14/19 1043 --   Rectal Monitor Lying Left arm       Pain Score       01/14/19 1033       No Pain        Wt Readings from Last 1 Encounters:   01/14/19 45 7 kg (100 lb 12 oz)     Vital Signs (abnormal): , RESPS 23-36, /100, 88/54, 86/60, 86/52, O2 SAT 89% ON RA  Pertinent Labs/Diagnostic Test Results: CO2 18, CREAT 0 58, ALK , ALT 88, , LACTIC ACID 4 6, PT 15 3, INR 1 27, AMMONIA 58, LIPASE 477  pH, Jacinto 7 363    pCO2, Jacinto 33 7     pO2, [place] 83 4     HCO3, Jacinto 18 7     Base Excess, Jacinto -5 6    O2 Content, Jacinto 19 9    O2 HGB, VENOUS 93 8       Protein, UA Trace       WBC, UA 2-4     MUCUS THREADS Occasional       CT CAP:  1  Admixed groundglass and nodular opacities in the superior segment left lower lobe  This could represent lingering sequelae of the largely resolved pneumonia seen in December or a new focal atypical infectious process  2   Well-circumscribed right upper-outer breast mass measuring 12 mm  Recommend further evaluation with diagnostic mammogram   3   Presence of abnormal air-fluid levels in the distal colon beyond the splenic flexure  No madai colonic wall thickening or surrounding inflammation  This pattern can be seen with viral enterocolitis, ileus, or stimulant laxative use  CT HEAD:  Chronic maxillary sinus mucosal disease  Otherwise, normal head CT      ED Treatment:   Medication Administration from 01/14/2019 1029 to 01/14/2019 1533       Date/Time Order Dose Route Action     01/14/2019 1032 midazolam (VERSED) injection 4 mg 4 mg Intramuscular Given     01/14/2019 1139 vancomycin (VANCOCIN) 750 mg in sodium chloride 0 9 % 250 mL IVPB 750 mg Intravenous New Bag     01/14/2019 1114 levETIRAcetam (KEPPRA) 2,000 mg in sodium chloride 0 9 % 250 mL IVPB 2,000 mg Intravenous Given     01/14/2019 1113 sodium chloride 0 9 % bolus 1,000 mL 1,000 mL Intravenous New Bag     01/14/2019 1224 lactated ringers bolus 1,000 mL 1,000 mL Intravenous New Bag     01/14/2019 1156 lactulose 20 g/30 mL oral solution 20 g 20 g Oral Given     01/14/2019 1225 cefepime (MAXIPIME) 2,000 mg in dextrose 5 % 50 mL IVPB 2,000 mg Intravenous New Bag     01/14/2019 1442 lactated ringers bolus 1,000 mL 1,000 mL Intravenous New Bag     01/14/2019 1401 iohexol (OMNIPAQUE) 350 MG/ML injection (SINGLE-DOSE) 90 mL 90 mL Intravenous Given        Past Medical/Surgical History:    Active Ambulatory Problems     Diagnosis Date Noted    Lennox-Gastaut syndrome with tonic seizures ([facility] ) 07/06/2017    Lactic acidosis 07/06/2017    Underweight 11/22/2017    Intellectual disability 11/22/2017    Hyperammonemia ([facility] ) 11/23/2017    Osteoporosis 09/17/2013    Onychomycosis 09/17/2013    Lethargy 10/10/2016    Hyperkeratosis 09/17/2013    Intractable epilepsy without status epilepticus ([facility] ) 04/23/2018    Somnolence 08/18/2018    Transaminitis 08/18/2018    Hypotension 08/20/2018    Incontinence 08/23/2018    Skin breakdown 10/09/2018    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    Hypoalbuminemia 10/10/2018    Right atrial enlargement 10/13/2018    Sedated due to multiple medications 01/10/2019     Resolved Ambulatory Problems     Diagnosis Date Noted    Pneumonia 02/22/2017    Dysphagia 02/22/2017    HCAP (healthcare-associated pneumonia) 02/23/2017    Drug-induced encephalopathy 02/23/2017    Positive blood culture 07/07/2017    Gastrostomy tube obstruction (HCC) 07/14/2017    Excessive daytime sleepiness 11/22/2017  Acute DANIEL (middle ear effusion) 11/23/2017    Macrocytic anemia 11/24/2017    Hypokalemia 11/24/2017    Acute respiratory failure with hypoxia (HCC) 12/02/2017    Hypernatremia 12/06/2017    Elevated liver enzymes 06/21/2016    Cerebral anoxic injury ([facility] ) 09/17/2013    ADHD, 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 ) 09/07/2018    Encephalopathy 10/07/2018    Aspiration pneumonia ([facility] ) 10/07/2018    Severe sepsis with septic shock (CODE) ([place] [address] ) 10/09/2018    Elevated MCV 10/10/2018    Pleural effusion 12/16/2018    Electrolyte abnormality 12/17/2018    Hypophosphatemia 12/18/2018    Diarrhea 12/18/2018     Past Medical History:   Diagnosis Date    ADHD     Anoxic brain damage (HCC)     Autistic disorder     Hyperammonemia (HCC)     Hyperkeratosis     Hypotension     Intellectual disability     Intellectual disability     Lennox-Gastaut syndrome with tonic seizures (HCC)     Lethargy     Liver enzyme elevation     Onychomycosis     Osteoporosis     Osteoporosis     Psychiatric disorder      Admitting Diagnosis: Seizure ([place] [address] ) [R56 9]  Transaminasemia [R74 0]  Breakthrough seizure ([place] [address] ) [G40 919]  Elevated lactic acid level [R79 89]  Sepsis ([place] [address] ) [A41 9]  Age/Sex: 41 y o  female  Assessment/Plan:   * Intractable epilepsy without status epilepticus ([place] [address] )   Assessment & Plan     Patient has known seizure disorder, I have discussed the case several times with her primary epileptologist      Patient will be continued on her complex regimen, will monitor the patient for at least 2 midnights      At this point I do not suspect any underlying infection, will monitor off of any further antibiotics and follow up cultures      Patient's respiratory status is stable and at baseline, she is currently satting 100% on room air       Metabolic acidosis, increased anion gap (IAG)   Assessment & Plan     Likely secondary to seizure      Lactic acidosis   Assessment & Plan     Likely secondary to seizure activity, will trend, give IV fluid       Lennox-Gastaut syndrome with tonic seizures (HCC)   Assessment & Plan     As above, chronic seizure disorder      Hypotension   Assessment & Plan     Patient's blood pressure is low normal which is her baseline blood pressure      Hyperammonemia (HCC)   Assessment & Plan     Chronic, continue medical management      Intellectual disability   Assessment & Plan     Patient appears to be at baseline mental status      Underweight   Assessment & Plan     Consult Nutrition, patient will be NPO tonight, if no further seizure activity can resume tube feeds tomorrow      Transaminitis   Assessment & Plan     Chronic, continue medical management         VTE Prophylaxis: Enoxaparin (Lovenox)  / sequential compression device   Anticipated Length of Stay:  Patient will be admitted on an Inpatient basis with an anticipated length of stay of  greater than 2 midnights     Justification for Hospital Stay:  Complex seizure history, patient will need ongoing inpatient monitoring     Admission Orders:  ICU, Cardio-Pulmonary Monitoring   NPO  IO  Seizure precautions  EEG  Consult neurology  Sequential compression device   EKG results  Scheduled Meds:   Current Facility-Administered Medications:  acetaminophen 650 mg Oral Q6H PRN   enoxaparin 40 mg Subcutaneous Q24H   lactulose 30 g Per G Tube TID   levETIRAcetam 1,000 mg Per G Tube Q12H [facility]   levOCARNitine 750 mg Per G Tube TID   melatonin 6 mg Per G Tube HS   multivitamin with iron-minerals 5 mL Per G Tube Daily   Perampanel 10 mg Per G Tube Daily   PHENobarbital 20 mg Oral HS   rufinamide 1,200 mg Per G Tube BID   senna-docusate sodium 1 tablet Per G Tube Daily   topiramate 200 mg Oral Q12H [facility]   valproic acid 250 mg Per G Tube Q8H [facility]     Continuous Infusions:    PRN Meds:   acetaminophen    [address] Utilization Review Department  Phone: [phone]; Fax [phone]  [email or web address]  [email or web address]  ATTENTION: Please call with any questions or concerns to [phone]  and carefully listen to the prompts so that you are directed to the right person  Send all requests for admission clinical reviews, approved or denied determinations and any other requests to fax [phone]   All voicemails are confidential

## 2019-01-15 NOTE — ASSESSMENT & PLAN NOTE
· With breakthrough seizure activity  · I discussed the case with Neurology  · Increase topamax to 250 mg via the PEG tube every 12 hours  · Continue her other seizure medications as prescribed  · PRN IV ativan  · If she develops additional breakthrough seizure activity, she will need transfer to a higher level of care for EMU (continuous EEG monitoring)  · Check for an infectious etiology  · Check blood cultures x 2 sets  · Check a urine culture  · Check Influenza/RSV PCR testing  · Follow the procalcitonin level  · Maintain ICU level of care  · IV fluids

## 2019-01-15 NOTE — PHYSICIAN ADVISOR
Current patient class: Inpatient  The patient is currently on Hospital Day: 2 at [address]        The patient was admitted to the hospital  on 1/14/19 at 1450 for the following diagnosis:  Seizure ([facility] [address] ) [R56 9]  Transaminasemia [R74 0]  Breakthrough [facility] ([facility] [G40 919]  Elevated lactic acid level [R79 89]  Sepsis ([facility] ) [A41 9]       There is documentation in the medical record of an expected length of stay of at least 2 midnights  The patient is therefore expected to satisfy the 2 midnight benchmark and given the 2 midnight presumption is appropriate for INPATIENT ADMISSION  Given this expectation of a satisfying stay, CMS instructs us that the patient is most often appropriate for inpatient admission under part A provided medical necessity is documented in the chart  After review of the relevant documentation, labs, vital signs and test results, the patient is appropriate for INPATIENT ADMISSION  Admission to the hospital as an inpatient is a complex decision making process which requires the practitioner to consider the patients presenting complaint, history and physical examination and all relevant testing  With this in mind, in this case, the patient was deemed appropriate for INPATIENT ADMISSION  After review of the documentation and testing available at the time of the admission I concur with this clinical determination of medical necessity  The patient does have an inpatient admission within the previous 30 days  The patient was admitted on 12/19/2018 and discharged on 12/24/2018 as an inpatient and was transferred to  The patient therefore required readmission review  In this case the patient should be considered a SEPARATE and UNRELATED INPATIENT ADMISSION  The patient had been discharged in stable condition with a completed care plan   There were no unresolved acute medical issues at the time of discharged which would have reasonably been expected to prompt this readmission  Rationale is as follows: The patient is a 41 yrs   Female who presented to the ED at 1/14/2019 10:30 AM with a chief complaint of worsening seizure activity  Patient was noted at the nursing home to have a seizure at 9 o'clock in the morning on the day of this admission  It lasted about 90 sec  The patient was somewhat agitated and confused with acute alteration from baseline based on documentation in the chart  The patient was admitted and required neurology evaluation and closer monitoring given her multiple episodes generalized convulsive seizures  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 secondary to pneumonia which she came back positive for MRSA and was intubated  Given the above as well as the documentation in the chart both these admissions are both separate and unrelated      The patients vitals on arrival were ED Triage Vitals   Temperature Pulse Respirations Blood Pressure SpO2   01/14/19 1033 01/14/19 1033 01/14/19 1033 01/14/19 1033 01/14/19 1033   100 3 °F (37 9 °C) 94 18 (!) 153/107 100 %      Temp Source Heart Rate Source Patient Position - Orthostatic VS BP Location FiO2 (%)   01/14/19 1033 01/14/19 1043 01/14/19 1043 01/14/19 1043 --   Rectal Monitor Lying Left arm       Pain Score       01/14/19 1033       No Pain           Past Medical History:   Diagnosis Date    ADHD     Anoxic brain damage (HCC)     Autistic disorder     Hyperammonemia (HCC)     Hyperkeratosis     Hypotension     Intellectual disability     Intellectual disability     Lennox-Gastaut syndrome with tonic seizures (HCC)     Lethargy     Liver enzyme elevation     Onychomycosis  Osteoporosis     Osteoporosis     Psychiatric disorder     anxiety     Past Surgical History:   Procedure Laterality Date    ABDOMINAL SURGERY      CARDIAC PACEMAKER PLACEMENT      IR THORACENTESIS  12/17/2018    JEJUNOSTOMY FEEDING TUBE      PEG TUBE PLACEMENT             Consults have been placed to:   IP CONSULT TO WOUND CARE  IP CONSULT TO NUTRITION SERVICES    Vitals:    01/15/19 0600 01/15/19 0630 01/15/19 0721 01/15/19 1141   BP: 91/62 92/64 93/61 93/65   BP Location: Left arm Left arm Left arm Left arm   Pulse: 71 73 70 80   Resp: 18 20 20 18   Temp:   97 6 °F (36 4 °C) (!) 96 9 °F (36 1 °C)   TempSrc:   Tympanic Tympanic   SpO2: 99% 98% 99% 95%   Weight:       Height:           Most recent labs:    Recent Labs      01/14/19   1041   01/14/19   1049   01/15/19   0511   WBC  9 24   --    --    --   8 00   HGB  14 2   --    --    --   12 0   HCT  45 1   --    --    --   38 7   PLT  285   --    --    --   211   K   --    --   4 2   < >  3 4*   CALCIUM   --    --   10 0   < >  8 7   BUN   --    --   22   < >  7   CREATININE   --    --   0 58*   < >  0 49*   LIPASE   --    --   477*   --    --    INR  1 27*   --    --    --   1 34*   TROPONINI  0 02   --    --    --    --    AST   --    < >  162*   --   130*   ALT   --    < >  88*   --   72   ALKPHOS   --    < >  159*   --   121*    < > = values in this interval not displayed         Scheduled Meds:  Current Facility-Administered Medications:  enoxaparin 40 mg Subcutaneous Q24H John Rayo, DO   lactulose 30 g Per G Tube TID John Rayo, DO   levETIRAcetam 1,000 mg Per G Tube Q12H [facility] John Rayo, DO   levOCARNitine 750 mg Per G Tube TID John Rayo, DO   LORazepam 1 mg Intravenous Q4H PRN Tomas Olivia, DO   magnesium sulfate 2 g Intravenous Once Tomas Olivia, DO   melatonin 3 mg Per G Tube HS Tomas Olivia, DO   multivitamin with iron-minerals 5 mL Per G Tube Daily Tristan Harvey, DO   ondansetron 4 mg Intravenous Q4H PRN Jordan Miguel [place], DO   Perampanel 10 mg Per G Tube Daily Daniel Menjivar, DO   PHENobarbital 20 mg Per G Tube HS Honey Cheeks, DO   potassium chloride 40 mEq Per G Tube Once Honey Cheeks, DO   rufinamide 1,200 mg Per G Tube BID Daniel Menjivar, DO   sodium chloride 0 45 % with KCl 20 mEq/L 100 mL/hr Intravenous Continuous Honey Cheeks, DO   topiramate 250 mg Per G Tube Q12H [address] Honey Cheeks, DO   topiramate 50 mg Per G Tube Once Honey Cheeks, DO   valproic acid 250 mg Per G Tube Q8H [address] Daniel Lalak, DO     Continuous Infusions:  sodium chloride 0 45 % with KCl 20 mEq/L 100 mL/hr     PRN Meds:  LORazepam    ondansetron    Surgical procedures (if appropriate):

## 2019-01-16 ENCOUNTER — APPOINTMENT (INPATIENT)
Dept: NEUROLOGY | Facility: HOSPITAL | Age: 39
DRG: 101 | End: 2019-01-16
Attending: INTERNAL MEDICINE
Payer: MEDICARE

## 2019-01-16 LAB
ADENOVIRUS: NOT DETECTED
ALBUMIN SERPL BCP-MCNC: 2.5 G/DL (ref 3.5–5)
ALP SERPL-CCNC: 136 U/L (ref 46–116)
ALT SERPL W P-5'-P-CCNC: 94 U/L (ref 12–78)
AMMONIA PLAS-SCNC: 47 UMOL/L (ref 11–35)
ANION GAP SERPL CALCULATED.3IONS-SCNC: 14 MMOL/L (ref 4–13)
AST SERPL W P-5'-P-CCNC: 152 U/L (ref 5–45)
BASOPHILS # BLD AUTO: 0.06 THOUSANDS/ΜL (ref 0–0.1)
BASOPHILS NFR BLD AUTO: 1 % (ref 0–1)
BILIRUB SERPL-MCNC: 0.4 MG/DL (ref 0.2–1)
BUN SERPL-MCNC: 4 MG/DL (ref 5–25)
C PNEUM DNA SPEC QL NAA+PROBE: NOT DETECTED
CALCIUM SERPL-MCNC: 8.4 MG/DL (ref 8.3–10.1)
CHLORIDE SERPL-SCNC: 107 MMOL/L (ref 100–108)
CK SERPL-CCNC: 20 U/L (ref 26–192)
CO2 SERPL-SCNC: 20 MMOL/L (ref 21–32)
CREAT SERPL-MCNC: 0.4 MG/DL (ref 0.6–1.3)
EOSINOPHIL # BLD AUTO: 0.22 THOUSAND/ΜL (ref 0–0.61)
EOSINOPHIL NFR BLD AUTO: 3 % (ref 0–6)
ERYTHROCYTE [DISTWIDTH] IN BLOOD BY AUTOMATED COUNT: 13.2 % (ref 11.6–15.1)
FLUAV H1 RNA SPEC QL NAA+PROBE: NOT DETECTED
FLUAV H3 RNA SPEC QL NAA+PROBE: NOT DETECTED
FLUAV RNA SPEC QL NAA+PROBE: NOT DETECTED
FLUBV RNA SPEC QL NAA+PROBE: NOT DETECTED
GFR SERPL CREATININE-BSD FRML MDRD: 133 ML/MIN/1.73SQ M
GLUCOSE SERPL-MCNC: 129 MG/DL (ref 65–140)
HAV IGM SER QL: NORMAL
HBOV DNA SPEC QL NAA+PROBE: NOT DETECTED
HBV CORE IGM SER QL: NORMAL
HBV SURFACE AG SER QL: NORMAL
HCOV 229E RNA SPEC QL NAA+PROBE: NOT DETECTED
HCOV HKU1 RNA SPEC QL NAA+PROBE: NOT DETECTED
HCOV NL63 RNA SPEC QL NAA+PROBE: NOT DETECTED
HCOV OC43 RNA SPEC QL NAA+PROBE: NOT DETECTED
HCT VFR BLD AUTO: 38.3 % (ref 34.8–46.1)
HCV AB SER QL: NORMAL
HGB BLD-MCNC: 11.9 G/DL (ref 11.5–15.4)
HPIV1 RNA SPEC QL NAA+PROBE: NOT DETECTED
HPIV2 RNA SPEC QL NAA+PROBE: NOT DETECTED
HPIV3 RNA SPEC QL NAA+PROBE: NOT DETECTED
HPIV4 RNA SPEC QL NAA+PROBE: NOT DETECTED
IMM GRANULOCYTES # BLD AUTO: 0.02 THOUSAND/UL (ref 0–0.2)
IMM GRANULOCYTES NFR BLD AUTO: 0 % (ref 0–2)
LACTATE SERPL-SCNC: 3.2 MMOL/L (ref 0.5–2)
LYMPHOCYTES # BLD AUTO: 2.9 THOUSANDS/ΜL (ref 0.6–4.47)
LYMPHOCYTES NFR BLD AUTO: 40 % (ref 14–44)
M PNEUMO DNA SPEC QL NAA+PROBE: NOT DETECTED
MAGNESIUM SERPL-MCNC: 1.7 MG/DL (ref 1.6–2.6)
MCH RBC QN AUTO: 33.1 PG (ref 26.8–34.3)
MCHC RBC AUTO-ENTMCNC: 31.1 G/DL (ref 31.4–37.4)
MCV RBC AUTO: 107 FL (ref 82–98)
METAPNEUMOVIRUS: NOT DETECTED
MONOCYTES # BLD AUTO: 0.81 THOUSAND/ΜL (ref 0.17–1.22)
MONOCYTES NFR BLD AUTO: 11 % (ref 4–12)
NEUTROPHILS # BLD AUTO: 3.3 THOUSANDS/ΜL (ref 1.85–7.62)
NEUTS SEG NFR BLD AUTO: 45 % (ref 43–75)
NRBC BLD AUTO-RTO: 0 /100 WBCS
PHOSPHATE SERPL-MCNC: 3.3 MG/DL (ref 2.7–4.5)
PLATELET # BLD AUTO: 211 THOUSANDS/UL (ref 149–390)
PMV BLD AUTO: 11.4 FL (ref 8.9–12.7)
POTASSIUM SERPL-SCNC: 4 MMOL/L (ref 3.5–5.3)
PROCALCITONIN SERPL-MCNC: <0.05 NG/ML
PROLACTIN SERPL-MCNC: 12.1 NG/ML
PROT SERPL-MCNC: 6.4 G/DL (ref 6.4–8.2)
RBC # BLD AUTO: 3.59 MILLION/UL (ref 3.81–5.12)
RHINOVIRUS RNA SPEC QL NAA+PROBE: NOT DETECTED
RSV A RNA SPEC QL NAA+PROBE: NOT DETECTED
RSV B RNA SPEC QL NAA+PROBE: NOT DETECTED
SODIUM SERPL-SCNC: 141 MMOL/L (ref 136–145)
TSH SERPL DL<=0.05 MIU/L-ACNC: 0.7 UIU/ML (ref 0.36–3.74)
WBC # BLD AUTO: 7.31 THOUSAND/UL (ref 4.31–10.16)

## 2019-01-16 PROCEDURE — 84443 ASSAY THYROID STIM HORMONE: CPT | Performed by: INTERNAL MEDICINE

## 2019-01-16 PROCEDURE — 83735 ASSAY OF MAGNESIUM: CPT | Performed by: INTERNAL MEDICINE

## 2019-01-16 PROCEDURE — 84100 ASSAY OF PHOSPHORUS: CPT | Performed by: INTERNAL MEDICINE

## 2019-01-16 PROCEDURE — 84146 ASSAY OF PROLACTIN: CPT | Performed by: INTERNAL MEDICINE

## 2019-01-16 PROCEDURE — 83605 ASSAY OF LACTIC ACID: CPT | Performed by: INTERNAL MEDICINE

## 2019-01-16 PROCEDURE — 84145 PROCALCITONIN (PCT): CPT | Performed by: INTERNAL MEDICINE

## 2019-01-16 PROCEDURE — 80053 COMPREHEN METABOLIC PANEL: CPT | Performed by: INTERNAL MEDICINE

## 2019-01-16 PROCEDURE — 82550 ASSAY OF CK (CPK): CPT | Performed by: INTERNAL MEDICINE

## 2019-01-16 PROCEDURE — 82140 ASSAY OF AMMONIA: CPT | Performed by: INTERNAL MEDICINE

## 2019-01-16 PROCEDURE — 85025 COMPLETE CBC W/AUTO DIFF WBC: CPT | Performed by: INTERNAL MEDICINE

## 2019-01-16 PROCEDURE — 80074 ACUTE HEPATITIS PANEL: CPT | Performed by: INTERNAL MEDICINE

## 2019-01-16 PROCEDURE — 99232 SBSQ HOSP IP/OBS MODERATE 35: CPT | Performed by: INTERNAL MEDICINE

## 2019-01-16 RX ORDER — MAGNESIUM SULFATE HEPTAHYDRATE 40 MG/ML
2 INJECTION, SOLUTION INTRAVENOUS ONCE
Status: COMPLETED | OUTPATIENT
Start: 2019-01-16 | End: 2019-01-16

## 2019-01-16 RX ADMIN — VALPROIC ACID 250 MG: 500 SOLUTION ORAL at 05:42

## 2019-01-16 RX ADMIN — PERAMPANEL 10 MG: 4 TABLET ORAL at 10:26

## 2019-01-16 RX ADMIN — MULTIVITAMIN 5 ML: LIQUID ORAL at 10:28

## 2019-01-16 RX ADMIN — POTASSIUM CHLORIDE AND SODIUM CHLORIDE 100 ML/HR: 450; 150 INJECTION, SOLUTION INTRAVENOUS at 10:50

## 2019-01-16 RX ADMIN — TOPIRAMATE 250 MG: 100 TABLET, FILM COATED ORAL at 21:56

## 2019-01-16 RX ADMIN — LEVOCARNITINE 750 MG: 1 SOLUTION ORAL at 17:32

## 2019-01-16 RX ADMIN — POTASSIUM CHLORIDE AND SODIUM CHLORIDE 100 ML/HR: 450; 150 INJECTION, SOLUTION INTRAVENOUS at 23:20

## 2019-01-16 RX ADMIN — TOPIRAMATE 250 MG: 100 TABLET, FILM COATED ORAL at 10:26

## 2019-01-16 RX ADMIN — RUFINAMIDE 1200 MG: 200 TABLET, FILM COATED ORAL at 17:32

## 2019-01-16 RX ADMIN — LEVOCARNITINE 750 MG: 1 SOLUTION ORAL at 21:55

## 2019-01-16 RX ADMIN — RUFINAMIDE 1200 MG: 200 TABLET, FILM COATED ORAL at 10:26

## 2019-01-16 RX ADMIN — ENOXAPARIN SODIUM 40 MG: 40 INJECTION SUBCUTANEOUS at 17:31

## 2019-01-16 RX ADMIN — VALPROIC ACID 250 MG: 500 SOLUTION ORAL at 21:56

## 2019-01-16 RX ADMIN — VALPROIC ACID 250 MG: 500 SOLUTION ORAL at 13:57

## 2019-01-16 RX ADMIN — PHENOBARBITAL 20 MG: 20 LIQUID ORAL at 21:55

## 2019-01-16 RX ADMIN — MELATONIN TAB 3 MG 3 MG: 3 TAB at 21:55

## 2019-01-16 RX ADMIN — LEVOCARNITINE 750 MG: 1 SOLUTION ORAL at 10:29

## 2019-01-16 RX ADMIN — LEVETIRACETAM 1000 MG: 100 SOLUTION ORAL at 10:28

## 2019-01-16 RX ADMIN — MAGNESIUM SULFATE HEPTAHYDRATE 2 G: 40 INJECTION, SOLUTION INTRAVENOUS at 13:07

## 2019-01-16 RX ADMIN — LEVETIRACETAM 1000 MG: 100 SOLUTION ORAL at 21:55

## 2019-01-16 RX ADMIN — Medication 100 MG: at 06:34

## 2019-01-16 NOTE — PROGRESS NOTES
Progress Note - Gudelia Sampson 1980, 41 y o  female MRN: [id number]    Unit/Bed#:  Encounter: [id number]    Primary Care Provider: Mariola Duran DO   Date and time admitted to hospital: 1/14/2019 10:30 AM        * Lennox-Gastaut syndrome with tonic seizures (HCC)   Assessment & Plan    · With breakthrough seizure activity  · I discussed the case with Neurology  · Topamax was increased to 250 mg via the PEG tube every 12 hours  · Continue her other seizure medications as prescribed  · PRN IV ativan  · If she develops additional breakthrough seizure activity, she will need transfer to a higher level of care for EMU (continuous EEG monitoring)  · Check for an infectious etiology  · Follow culture results  · Follow the procalcitonin level  · Continue IV fluids    The patient can be downgraded to med/surg level of care  Breast mass   Assessment & Plan    · Outpatient mammogram     Metabolic acidosis, increased anion gap (IAG)   Assessment & Plan    · Likely secondary to lactic acidosis  · Continue to monitor the anion gap and sodium bicarbonate level     Hypoalbuminemia   Assessment & Plan    · Nutrition/dietitian consultation     Skin breakdown   Assessment & Plan    · I appreciate the wound care team nurse's input  · Assessment and plan per the wound care nurse's note as follows:  Assessment:   Moisture associated skin damage sacrum and buttocks  Skin is pink and intact  Calmoseptine in place  On tube feeds, incontinent of liquid stool, per nursing staff  Contractures  On Low Air Loss surface, padding in place on bed rails  History of seizures  Bed bound      Plan:   1  Maintain low air loss surface in CCU and on transfer to med surg to assist with moisture control/microclimate  2   Continue Calmoseptine/Calazime  3   Turn and reposition q 2 hours  4   Weekly follow up with wound care RN       Hypotension   Assessment & Plan    · Continue IV fluids  · Follow the blood pressure trend Transaminitis   Assessment & Plan    · Chronic transamintis  · Likely secondary to a medication effect  · Avoid all hepatotoxic agents  · Check an acute hepatitis panel  · Follow the LFT's (liver function tests)     Hypernatremia   Assessment & Plan    · Improved with 1/2 NSS IV fluids with 20 meQ of KCl at 100 ml/hr  · Follow the sodium level     Hypokalemia   Assessment & Plan    · Improved with potassium chloride supplementation  · Follow the potassium level and magnesium level     Hyperammonemia (HCC)   Assessment & Plan    · Likely secondary to valproic acid  · Hold lactulose with the patient currently having multiple episodes of diarrhea     Lactic acidosis   Assessment & Plan    · Likely secondary to seizure activity  · Continue IV fluids  · Thiamine 100 mg IV x 1 dose was given today, 2019  · Follow the lactic acid level             VTE Pharmacologic Prophylaxis:   Pharmacologic: Enoxaparin (Lovenox)  Mechanical VTE Prophylaxis in Place: Yes    Patient Centered Rounds: I have performed bedside rounds with nursing staff today  Time Spent for Care: 20 minutes  More than 50% of total time spent on counseling and coordination of care as described above  Current Length of Stay: 2 day(s)    Current Patient Status: Inpatient   Certification Statement: The patient will continue to require additional inpatient hospital stay due to the need for close neurologic status monitoring to observe for any additional seizure activity and for continuous IV fluids  Code Status: Level 1 - Full Code      Subjective: The patient was seen and examined  The patient is doing better  The patient is much more awake and alert today, 2019      Objective:     Vitals:   Temp (24hrs), Av 6 °F (36 4 °C), Min:97 2 °F (36 2 °C), Max:97 8 °F (36 6 °C)    Temp:  [97 2 °F (36 2 °C)-97 8 °F (36 6 °C)] 97 8 °F (36 6 °C)  HR:  [] 105  Resp:  [14-22] 21  BP: (81-98)/(56-70) 96/56  SpO2:  [96 %-99 %] 96 %  Body mass index is 20 93 kg/m²  Input and Output Summary (last 24 hours): Intake/Output Summary (Last 24 hours) at 01/16/19 1220  Last data filed at 01/16/19 1050   Gross per 24 hour   Intake          2760 34 ml   Output                0 ml   Net          2760 34 ml       Physical Exam:     Physical Exam  General:  NAD, more awake and alert today, mostly non-verbal  HEENT:  NC/AT, mucous membranes moist  Neck:  Supple, No JVP elevation  CV:  + S1, + S2, Tachycardic at times, Regular rhythm  Pulm:  Lung fields are CTA bilaterally  Abd:  Soft, Non-tender, Non-distended, PEG tube in place  Ext:  No clubbing/cyanosis/edema  Skin:  + Maculopapular facial rash involving the bilateral cheeks (bilateral maxillary region) and involving the forehead      Additional Data:    Labs:      Results from last 7 days  Lab Units 01/16/19  0531   WBC Thousand/uL 7 31   HEMOGLOBIN g/dL 11 9   HEMATOCRIT % 38 3   PLATELETS Thousands/uL 211   NEUTROS PCT % 45   LYMPHS PCT % 40   MONOS PCT % 11   EOS PCT % 3       Results from last 7 days  Lab Units 01/16/19  0531   SODIUM mmol/L 141   POTASSIUM mmol/L 4 0   CHLORIDE mmol/L 107   CO2 mmol/L 20*   BUN mg/dL 4*   CREATININE mg/dL 0 40*   ANION GAP mmol/L 14*   CALCIUM mg/dL 8 4   ALBUMIN g/dL 2 5*   TOTAL BILIRUBIN mg/dL 0 40   ALK PHOS U/L 136*   ALT U/L 94*   AST U/L 152*   GLUCOSE RANDOM mg/dL 129       Results from last 7 days  Lab Units 01/15/19  0511   INR  1 34*       Results from last 7 days  Lab Units 01/14/19  1040   POC GLUCOSE mg/dl 169*           Results from last 7 days  Lab Units 01/16/19  0531 01/14/19  1717 01/14/19  1441 01/14/19  1041   LACTIC ACID mmol/L 3 2* 2 0 3 1* 4 6*   PROCALCITONIN ng/ml <0 05 <0 05  --   --            * I Have Reviewed All Lab Data Listed Above  * Additional Pertinent Lab Tests Reviewed:  Maddie [facility] Admission Reviewed        Recent Cultures (last 7 days):       Results from last 7 days  Lab Units 01/14/19  1041   BLOOD CULTURE  No Growth at 24 hrs  No Growth at 24 hrs  Last 24 Hours Medication List:     Current Facility-Administered Medications:  enoxaparin 40 mg Subcutaneous Q24H Sundra Manzanilla, DO    levETIRAcetam 1,000 mg Per G Tube Q12H [facility] Sundra Manzanilla, DO    levOCARNitine 750 mg Per G Tube TID Sundra Manzanilla, DO    LORazepam 1 mg Intravenous Q4H PRN Brian Sidhu, DO    magnesium sulfate 2 g Intravenous Once Vearl [place], DO    melatonin 3 mg Per G Tube HS [facility], [facility]    multivitamin with iron-minerals 5 mL Per G Tube Daily Tristan Harvey, DO    ondansetron 4 mg Intravenous Q4H PRN Kalyanil Nahum, DO    Perampanel 10 mg Per G Tube Daily Sundra Rajesh, DO    PHENobarbital 20 mg Per G Tube HS [facility], DO    rufinamide 1,200 mg Per G Tube BID Sundra Manzanilla, DO    sodium chloride 0 45 % with KCl 20 mEq/L 100 mL/hr Intravenous Continuous Brian Sidhu DO Last Rate: 100 mL/hr (01/16/19 1050)   topiramate 250 mg Per G Tube Q12H [facility] Brian Sidhu,     valproic acid 250 mg Per G Tube Q8H [facility] Shaunna Rajesh,          Today, Patient Was Seen By: Brian Sidhu DO    ** Please Note: Dictation voice to text software may have been used in the creation of this document   **

## 2019-01-16 NOTE — ED PROCEDURE NOTE
PROCEDURE  ECG 12 Lead Documentation  Date/Time: 1/14/2019 11:48 AM  Performed by: Dagmar Teague  Authorized by: Dagmar Teague     Indications / Diagnosis:  Seizure  ECG reviewed by me, the ED Provider: yes    Patient location:  ED  Previous ECG:     Comparison to cardiac monitor: Yes    Quality:     Tracing quality:  Limited by artifact  Rate:     ECG rate:  92    ECG rate assessment: normal    Rhythm:     Rhythm: sinus rhythm    Ectopy:     Ectopy: none    QRS:     QRS axis:  Normal    QRS intervals:  Normal  Conduction:     Conduction: normal    ST segments:     ST segments:  Normal  T waves:     T waves: normal    Comments:      Pr 146 qrs 66 [address] [id number], DO  01/15/19 8831

## 2019-01-16 NOTE — ASSESSMENT & PLAN NOTE
· Likely secondary to valproic acid  · Hold lactulose with the patient currently having multiple episodes of diarrhea

## 2019-01-16 NOTE — ASSESSMENT & PLAN NOTE
· I appreciate the wound care team nurse's input  · Assessment and plan per the wound care nurse's note as follows:  Assessment:   Moisture associated skin damage sacrum and buttocks  Skin is pink and intact  Calmoseptine in place  On tube feeds, incontinent of liquid stool, per nursing staff  Contractures  On Low Air Loss surface, padding in place on bed rails  History of seizures  Bed bound      Plan:   1  Maintain low air loss surface in CCU and on transfer to med surg to assist with moisture control/microclimate  2   Continue Calmoseptine/Calazime  3   Turn and reposition q 2 hours  4   Weekly follow up with wound care RN

## 2019-01-16 NOTE — CONSULTS
Progress Note - Wound   Doreatha Ridges 41 y o  female MRN: [id number]  Unit/Bed#:  Encounter: [id number]      Assessment:   Moisture associated skin damage sacrum and buttocks  Skin is pink and intact  Calmoseptine in place  On tube feeds, incontinent of liquid stool, per nursing staff  Contractures  On Low Air Loss surface, padding in place on bed rails  History of seizures  Bed bound  Plan:   1  Maintain low air loss surface in CCU and on transfer to [facility] to assist with moisture control/microclimate  2   Continue Calmoseptine/Calazime  3   Turn and reposition q 2 hours  4   Weekly follow up with wound care RN  Vitals: Blood pressure 96/56, pulse 105, temperature 97 8 °F (36 6 °C), temperature source Tympanic, resp  rate 21, height 5' (1 524 m), weight 48 6 kg (107 lb 2 3 oz), SpO2 96 %  ,Body mass index is 20 93 kg/m²      Wound 08/21/18 Moisture associated skin damage Sacrum Mid (Active)       Wound 01/14/19 Moisture associated skin damage  Mid linear open area,stage 2 (Active)   Wound Description Epithelialization 1/16/2019  9:00 AM   Alisson-wound Assessment Pink 1/16/2019  9:00 AM   Shape linear 1/16/2019 12:00 AM   Wound Length (cm) 0 7 cm 1/14/2019  3:50 PM   Wound Width (cm) 0 2 cm 1/14/2019  3:50 PM   Drainage Amount None 1/16/2019  9:00 AM   Treatments Cleansed;Site care 1/16/2019 12:00 AM   Dressing Protective barrier 1/16/2019  9:00 AM   Patient Tolerance Tolerated well 1/16/2019  9:00 AM   Dressing Status Intact 1/16/2019  9:00 AM     Lionel Bradshaw RN CWOCN 1/16/2019 10:35

## 2019-01-16 NOTE — PROGRESS NOTES
EEG was attempted on patient, however, patient uncooperative, pulling and scratching at techs hands

## 2019-01-16 NOTE — ASSESSMENT & PLAN NOTE
· Improved with potassium chloride supplementation  · Follow the potassium level and magnesium level

## 2019-01-16 NOTE — ASSESSMENT & PLAN NOTE
· Likely secondary to lactic acidosis  · Continue to monitor the anion gap and sodium bicarbonate level

## 2019-01-16 NOTE — ASSESSMENT & PLAN NOTE
· Likely secondary to seizure activity  · Continue IV fluids  · Thiamine 100 mg IV x 1 dose was given today, 01/16/2019  · Follow the lactic acid level

## 2019-01-16 NOTE — ASSESSMENT & PLAN NOTE
· With breakthrough seizure activity  · I discussed the case with Neurology  · Topamax was increased to 250 mg via the PEG tube every 12 hours  · Continue her other seizure medications as prescribed  · PRN IV ativan  · If she develops additional breakthrough seizure activity, she will need transfer to a higher level of care for EMU (continuous EEG monitoring)  · Check for an infectious etiology  · Follow culture results  · Follow the procalcitonin level  · Continue IV fluids    The patient can be downgraded to med/surg level of care

## 2019-01-17 VITALS
BODY MASS INDEX: 21.04 KG/M2 | DIASTOLIC BLOOD PRESSURE: 67 MMHG | SYSTOLIC BLOOD PRESSURE: 108 MMHG | HEART RATE: 93 BPM | TEMPERATURE: 98.1 F | OXYGEN SATURATION: 98 % | WEIGHT: 107.14 LBS | RESPIRATION RATE: 28 BRPM | HEIGHT: 60 IN

## 2019-01-17 LAB
ALBUMIN SERPL BCP-MCNC: 2.8 G/DL (ref 3.5–5)
ALP SERPL-CCNC: 158 U/L (ref 46–116)
ALT SERPL W P-5'-P-CCNC: 88 U/L (ref 12–78)
ANION GAP SERPL CALCULATED.3IONS-SCNC: 13 MMOL/L (ref 4–13)
AST SERPL W P-5'-P-CCNC: 118 U/L (ref 5–45)
BASOPHILS # BLD AUTO: 0.04 THOUSANDS/ΜL (ref 0–0.1)
BASOPHILS NFR BLD AUTO: 1 % (ref 0–1)
BILIRUB SERPL-MCNC: 0.3 MG/DL (ref 0.2–1)
BUN SERPL-MCNC: 3 MG/DL (ref 5–25)
CALCIUM SERPL-MCNC: 9.1 MG/DL (ref 8.3–10.1)
CHLORIDE SERPL-SCNC: 104 MMOL/L (ref 100–108)
CO2 SERPL-SCNC: 22 MMOL/L (ref 21–32)
CREAT SERPL-MCNC: 0.46 MG/DL (ref 0.6–1.3)
EOSINOPHIL # BLD AUTO: 0.19 THOUSAND/ΜL (ref 0–0.61)
EOSINOPHIL NFR BLD AUTO: 3 % (ref 0–6)
ERYTHROCYTE [DISTWIDTH] IN BLOOD BY AUTOMATED COUNT: 13.2 % (ref 11.6–15.1)
GFR SERPL CREATININE-BSD FRML MDRD: 127 ML/MIN/1.73SQ M
GLUCOSE SERPL-MCNC: 149 MG/DL (ref 65–140)
HCT VFR BLD AUTO: 41.6 % (ref 34.8–46.1)
HGB BLD-MCNC: 12.9 G/DL (ref 11.5–15.4)
IMM GRANULOCYTES # BLD AUTO: 0.01 THOUSAND/UL (ref 0–0.2)
IMM GRANULOCYTES NFR BLD AUTO: 0 % (ref 0–2)
LACTATE SERPL-SCNC: 1.4 MMOL/L (ref 0.5–2)
LACTATE SERPL-SCNC: 4.6 MMOL/L (ref 0.5–2)
LYMPHOCYTES # BLD AUTO: 2.6 THOUSANDS/ΜL (ref 0.6–4.47)
LYMPHOCYTES NFR BLD AUTO: 37 % (ref 14–44)
MAGNESIUM SERPL-MCNC: 2.1 MG/DL (ref 1.6–2.6)
MCH RBC QN AUTO: 32.9 PG (ref 26.8–34.3)
MCHC RBC AUTO-ENTMCNC: 31 G/DL (ref 31.4–37.4)
MCV RBC AUTO: 106 FL (ref 82–98)
MONOCYTES # BLD AUTO: 0.75 THOUSAND/ΜL (ref 0.17–1.22)
MONOCYTES NFR BLD AUTO: 11 % (ref 4–12)
MRSA NOSE QL CULT: ABNORMAL
MRSA NOSE QL CULT: ABNORMAL
NEUTROPHILS # BLD AUTO: 3.41 THOUSANDS/ΜL (ref 1.85–7.62)
NEUTS SEG NFR BLD AUTO: 48 % (ref 43–75)
NRBC BLD AUTO-RTO: 0 /100 WBCS
PHOSPHATE SERPL-MCNC: 4.1 MG/DL (ref 2.7–4.5)
PLATELET # BLD AUTO: 126 THOUSANDS/UL (ref 149–390)
PMV BLD AUTO: 11.9 FL (ref 8.9–12.7)
POTASSIUM SERPL-SCNC: 4.2 MMOL/L (ref 3.5–5.3)
PROCALCITONIN SERPL-MCNC: 0.06 NG/ML
PROT SERPL-MCNC: 7.2 G/DL (ref 6.4–8.2)
RBC # BLD AUTO: 3.92 MILLION/UL (ref 3.81–5.12)
SODIUM SERPL-SCNC: 139 MMOL/L (ref 136–145)
WBC # BLD AUTO: 7 THOUSAND/UL (ref 4.31–10.16)

## 2019-01-17 PROCEDURE — 85025 COMPLETE CBC W/AUTO DIFF WBC: CPT | Performed by: INTERNAL MEDICINE

## 2019-01-17 PROCEDURE — 80053 COMPREHEN METABOLIC PANEL: CPT | Performed by: INTERNAL MEDICINE

## 2019-01-17 PROCEDURE — 99239 HOSP IP/OBS DSCHRG MGMT >30: CPT | Performed by: INTERNAL MEDICINE

## 2019-01-17 PROCEDURE — 83735 ASSAY OF MAGNESIUM: CPT | Performed by: INTERNAL MEDICINE

## 2019-01-17 PROCEDURE — 84100 ASSAY OF PHOSPHORUS: CPT | Performed by: INTERNAL MEDICINE

## 2019-01-17 PROCEDURE — 84145 PROCALCITONIN (PCT): CPT | Performed by: INTERNAL MEDICINE

## 2019-01-17 PROCEDURE — 83605 ASSAY OF LACTIC ACID: CPT | Performed by: INTERNAL MEDICINE

## 2019-01-17 RX ORDER — LEVETIRACETAM 100 MG/ML
1000 SOLUTION ORAL EVERY 12 HOURS SCHEDULED
Refills: 0
Start: 2019-01-17 | End: 2019-04-25 | Stop reason: SDUPTHER

## 2019-01-17 RX ORDER — CHLORHEXIDINE GLUCONATE 4 G/100ML
1 SOLUTION TOPICAL DAILY
Qty: 120 ML | Refills: 0
Start: 2019-01-17 | End: 2019-01-31

## 2019-01-17 RX ORDER — LACTULOSE 20 G/30ML
20 SOLUTION ORAL 3 TIMES DAILY
Refills: 0
Start: 2019-01-17 | End: 2019-07-03 | Stop reason: HOSPADM

## 2019-01-17 RX ORDER — TOPIRAMATE 50 MG/1
250 TABLET, FILM COATED ORAL EVERY 12 HOURS SCHEDULED
Refills: 0
Start: 2019-01-17 | End: 2019-01-22 | Stop reason: SDUPTHER

## 2019-01-17 RX ADMIN — ENOXAPARIN SODIUM 40 MG: 40 INJECTION SUBCUTANEOUS at 18:23

## 2019-01-17 RX ADMIN — TOPIRAMATE 250 MG: 100 TABLET, FILM COATED ORAL at 09:34

## 2019-01-17 RX ADMIN — MULTIVITAMIN 5 ML: LIQUID ORAL at 09:35

## 2019-01-17 RX ADMIN — SODIUM CHLORIDE 1000 ML: 0.9 INJECTION, SOLUTION INTRAVENOUS at 10:16

## 2019-01-17 RX ADMIN — LEVETIRACETAM 1000 MG: 100 SOLUTION ORAL at 09:34

## 2019-01-17 RX ADMIN — LEVOCARNITINE 750 MG: 1 SOLUTION ORAL at 16:49

## 2019-01-17 RX ADMIN — THIAMINE HYDROCHLORIDE 200 MG: 100 INJECTION, SOLUTION INTRAMUSCULAR; INTRAVENOUS at 11:23

## 2019-01-17 RX ADMIN — LEVOCARNITINE 750 MG: 1 SOLUTION ORAL at 09:37

## 2019-01-17 RX ADMIN — PERAMPANEL 10 MG: 4 TABLET ORAL at 09:33

## 2019-01-17 RX ADMIN — RUFINAMIDE 1200 MG: 200 TABLET, FILM COATED ORAL at 10:02

## 2019-01-17 RX ADMIN — VALPROIC ACID 250 MG: 500 SOLUTION ORAL at 06:25

## 2019-01-17 RX ADMIN — VALPROIC ACID 250 MG: 500 SOLUTION ORAL at 15:26

## 2019-01-17 NOTE — PLAN OF CARE
Problem: Nutrition/Hydration-ADULT  Goal: Nutrient/Hydration intake appropriate for improving, restoring or maintaining nutritional needs  Monitor and assess patient's nutrition/hydration status for malnutrition (ex- brittle hair, bruises, dry skin, pale skin and conjunctiva, muscle wasting, smooth red tongue, and disorientation)  Collaborate with interdisciplinary team and initiate plan and interventions as ordered  Monitor patient's weight and dietary intake as ordered or per policy  Utilize nutrition screening tool and intervene per policy  provide high-protein, high-caloric foods as appropriate       INTERVENTIONS:  - Monitor oral intake, urinary output, labs, and treatment plans  - Assess nutrition and hydration status and recommend course of action  The patient has a tube feed   HOB elevated for feedings    - Recommend/ encourage appropriate diets, oral nutritional supplements, and vitamin/mineral supplements  - Order, calculate, and assess calorie counts as needed  - Recommend, monitor, and adjust tube feedings  based on assessed needs  - Assess need for intravenous fluids  - Provide specific nutrition/hydration education as appropriate The patient has limited understanding of the instruction and the family is not present   - Include patient/family/caregiver in decisions related to nutrition    Outcome: Progressing

## 2019-01-17 NOTE — ASSESSMENT & PLAN NOTE
· Resolved with IV fluids and with IV thiamine treatment  · No infectious etiology of the lactic acidosis was found with the following culture results:  Procedure Component Value - Date/Time   MRSA culture [[id number]] (Abnormal) Collected: 01/15/19 1431   Lab Status: Final result Specimen: Nares from Nose Updated: 01/17/19 1322    MRSA Culture Only Methicillin Resistant Staphylococcus aureus isolated (A)     Please note: This patient requires contact precautions  Respiratory Pathogen Profile, PCR [[id number]] (Normal) Collected: 01/15/19 1431   Lab Status: Final result Specimen: Nasopharyngeal from Nasopharyngeal Swab Updated: 01/16/19 1020    INFLU A/MATRIX GENE Not Detected    Influenza A/H1 Not Detected    Influenza A/H3 Not Detected    INFLUENZA B Not Detected    RSV A Not Detected    RSV B Not Detected    Coronavirus 229E Not Detected    Coronavirus OC43 Not Detected    Coronavirus NL63 Not Detected    Coronavirus HKU1 Not Detected    METAPNEUMOVIRUS Not Detected    RHINOVIRUS Not Detected    ADENOVIRUS Not Detected    PARAINFLUENZA 1 Not Detected    PARAINFLUENZA 2 Not Detected    PARAINFLUENZA 3 Not Detected    Parainfluenza 4 Not Detected    Human Bocavirus Not Detected    Chlamydophila pneumoniae Not Detected    Mycoplasma pneumoniae Not Detected   Urine culture [[id number]]    Lab Status: No result Specimen: Urine from Urine, Clean Catch    Blood culture #1 [[id number]] Collected: 01/14/19 1041   Lab Status: Preliminary result Specimen: Blood from Arm, Left Updated: 01/16/19 1601    Blood Culture No Growth at 48 hrs  Blood culture #2 [[id number]] Collected: 01/14/19 1041   Lab Status: Preliminary result Specimen: Blood from Arm, Left Updated: 01/16/19 1601    Blood Culture No Growth at 48 hrs

## 2019-01-17 NOTE — ASSESSMENT & PLAN NOTE
· With breakthrough seizure activity  · I discussed the case with Neurology  · Topamax was increased to 250 mg via the PEG tube every 12 hours  · Continue her other seizure medications as previously prescribed  · If she develops additional breakthrough seizure activity, she will require admission to a higher level of care for EMU (continuous EEG monitoring)

## 2019-01-17 NOTE — PLAN OF CARE
Problem: NEUROSENSORY - ADULT  Goal: Achieves stable or improved neurological status  INTERVENTIONS  - Monitor and report changes in neurological status  - Initiate measures to prevent increased intracranial pressure  - Maintain blood pressure and fluid volume within ordered parameters to optimize cerebral perfusion  - Monitor temperature, glucose, and sodium or any other associated labs   Initiate appropriate interventions as ordered  - Monitor for seizure activity   - Administer anti-seizure medications as ordered  Outcome: Adequate for Discharge

## 2019-01-17 NOTE — PLAN OF CARE
Problem: DISCHARGE PLANNING - CARE MANAGEMENT  Goal: Discharge to post-acute care or home with appropriate resources  INTERVENTIONS:  - Conduct assessment to determine patient/family and health care team treatment goals, and need for post-acute services based on payer coverage, community resources, and patient preferences, and barriers to discharge  - Address psychosocial, clinical, and financial barriers to discharge as identified in assessment in conjunction with the patient/family and health care team  - Arrange appropriate level of post-acute services according to patient's   needs and preference and payer coverage in collaboration with the physician and health care team  - Communicate with and update the patient/family, physician, and health care team regarding progress on the discharge plan  - Arrange appropriate transportation to post-acute venues  Pt will [facility] to [facility] on discharge   Outcome: Completed Date Met: 01/17/19  -dc back to [facility]

## 2019-01-17 NOTE — PLAN OF CARE
Problem: Prexisting or High Potential for Compromised Skin Integrity  Goal: Skin integrity is maintained or improved  INTERVENTIONS:  - Identify patients at risk for skin breakdown  - Assess and monitor skin integrity  - Assess and monitor nutrition and hydration status  - Monitor labs (i e  albumin)  - Assess for incontinence   - Turn and reposition patient  - Assist with mobility/ambulation  - Relieve pressure over bony prominences  - Avoid friction and shearing  - Provide appropriate hygiene as needed including keeping skin clean and dry  - Evaluate need for skin moisturizer/barrier cream  - Collaborate with interdisciplinary team (i e  Nutrition, Rehabilitation, etc )   - Patient/family teaching   Outcome: Adequate for Discharge      Problem: Potential for Falls  Goal: Patient will remain free of falls  INTERVENTIONS:  - Assess patient frequently for physical needs  -  Identify cognitive and physical deficits and behaviors that affect risk of falls  -  [place] fall precautions as indicated by assessment   - Educate patient/family on patient safety including physical limitations  - Instruct patient to call for assistance with activity based on assessment  - Modify environment to reduce risk of injury the patient is a chelita lift at the [facility] niursing facility she is bed [facility] bound   - Consider OT/PT consult to assist with strengthening/mobility    Outcome: Adequate for Discharge      Problem: Nutrition/Hydration-ADULT  Goal: Nutrient/Hydration intake appropriate for improving, restoring or maintaining nutritional needs  Monitor and assess patient's nutrition/hydration status for malnutrition (ex- brittle hair, bruises, dry skin, pale skin and conjunctiva, muscle wasting, smooth red tongue, and disorientation)  Collaborate with interdisciplinary team and initiate plan and interventions as ordered  Monitor patient's weight and dietary intake as ordered or per policy   Utilize nutrition screening tool and intervene per policy  provide high-protein, high-caloric foods as appropriate  INTERVENTIONS:  - Monitor oral intake, urinary output, labs, and treatment plans  - Assess nutrition and hydration status and recommend course of action  The patient has a tube feed   HOB elevated for feedings    - Recommend/ encourage appropriate diets, oral nutritional supplements, and vitamin/mineral supplements  - Order, calculate, and assess calorie counts as needed  - Recommend, monitor, and adjust tube feedings  based on assessed needs  - Assess need for intravenous fluids  - Provide specific nutrition/hydration education as appropriate The patient has limited understanding of the instruction and the family is not present   - Include patient/family/caregiver in decisions related to nutrition     Outcome: Adequate for Discharge      Problem: NEUROSENSORY - ADULT  Goal: Achieves stable or improved neurological status  INTERVENTIONS  - Monitor and report changes in neurological status  - Initiate measures to prevent increased intracranial pressure  - Maintain blood pressure and fluid volume within ordered parameters to optimize cerebral perfusion  - Monitor temperature, glucose, and sodium or any other associated labs   Initiate appropriate interventions as ordered  - Monitor for seizure activity   - Administer anti-seizure medications as ordered   Outcome: Adequate for Discharge    Goal: Absence of seizures  INTERVENTIONS  - Monitor for seizure activity  - Administer anti-seizure medications as ordered ( note the changes in the medications and monitor the effects of the medication changes )  - Monitor neurological status   Outcome: Adequate for Discharge    Goal: Remains free of injury related to seizures activity  INTERVENTIONS  - Maintain airway, patient safety  and administer oxygen as ordered  - Monitor patient for seizure activity, document and report duration and description of seizure to physician/advanced practitioner  - If seizure occurs,  ensure patient safety during seizure  - Reorient patient post seizure  - Instruct patient/family to notify RN of any seizure activity including if an aura is experienced (the patient has limited understanding of teaching ) family not present )  - Instruct patient/family to call for assistance with activity based on nursing assessment  - Administer anti-seizure medications as ordered   Outcome: Adequate for Discharge    Goal: Achieves maximal functionality and self care  INTERVENTIONS  - Monitor swallowing and airway patency with patient fatigue and changes in neurological status  - Encourage and assist patient to increase activity and self care with guidance from rehab services  - Encourage visually impaired, hearing impaired and aphasic patients to use assistive/communication devices   Outcome: Adequate for Discharge

## 2019-01-17 NOTE — ASSESSMENT & PLAN NOTE
· Chronic transamintis  · Likely secondary to a medication effect  · Avoid all hepatotoxic agents  · Follow the LFT's (liver function tests) after discharge with her PCP    Results for Polina Mccracken (MRN [id number]) as of 1/17/2019 15:14   Ref   Range 1/16/2019 05:31   HEPATITIS A IGM ANTIBODY Latest Ref Range: Non-reactive, Equivocal-Suggest Recollect  Non-reactive   HEPATITIS B SURFACE ANTIGEN Latest Ref Range: Non-reactive, NonReactive - Confirmed  Non-reactive   HEPATITIS B CORE IGM ANTIBODY Latest Ref Range: Non-reactive  Non-reactive   HEPATITIS C ANTIBODY Latest Ref Range: Non-reactive  Non-reactive

## 2019-01-17 NOTE — ASSESSMENT & PLAN NOTE
· The patient was evaluated by the wound care team nurse during the hospitalization  · I appreciate the wound care team nurse's input  · Assessment and plan per the wound care nurse's note as follows:  Assessment:   Moisture associated skin damage sacrum and buttocks  Skin is pink and intact  Calmoseptine in place  On tube feeds, incontinent of liquid stool, per nursing staff  Contractures  On Low Air Loss surface, padding in place on bed rails  History of seizures  Bed bound      Plan:   1  Maintain low air loss surface in CCU and on transfer to med surg to assist with moisture control/microclimate  2   Continue Calmoseptine/Calazime  3   Turn and reposition q 2 hours  4   Weekly follow up with wound care RN

## 2019-01-17 NOTE — ASSESSMENT & PLAN NOTE
· Improved with potassium chloride supplementation  · Follow the potassium level and magnesium level after discharge with her PCP

## 2019-01-17 NOTE — ASSESSMENT & PLAN NOTE
The patient will be maintained on contact isolation/precautions  She will be placed on nasal bactroban/mupirocin 2% ointment applied to both nares BID for 5 days  The patient will also need MRSA decolonization at [facility] including chlorhexidine/hibiclens rinse applied from the neck down to the toes daily with bathing/showering for 2 weeks  She will need a MRSA nasal screen rechecked in 2 weeks after decolonization

## 2019-01-17 NOTE — DISCHARGE SUMMARY
Discharge- [facility] COTTAGE 1980, 41 y o  female MRN: [id number]    Unit/Bed#:  Encounter: [id number]    Primary Care Provider: Bethany Bae DO   Date and time admitted to hospital: 1/14/2019 10:30 AM        * Lennox-Gastaut syndrome with tonic seizures (HCC)   Assessment & Plan    · With breakthrough seizure activity  · I discussed the case with Neurology  · Topamax was increased to 250 mg via the PEG tube every 12 hours  · Continue her other seizure medications as previously prescribed  · If she develops additional breakthrough seizure activity, she will require admission to a higher level of care for EMU (continuous EEG monitoring)       Breast mass   Assessment & Plan    · Outpatient mammogram  · Outpatient follow-up with Dr Elisha Wood (Surgical Oncology)     Metabolic acidosis, increased anion gap (IAG)   Assessment & Plan    · Likely secondary to lactic acidosis  · Resolved     Hypoalbuminemia   Assessment & Plan    · Continue tube feedings  · Monitor her nutritional status closely after discharge     MRSA colonization   Assessment & Plan    The patient will be maintained on contact isolation/precautions  She will be placed on nasal bactroban/mupirocin 2% ointment applied to both nares BID for 5 days  The patient will also need MRSA decolonization at [facility] including chlorhexidine/hibiclens rinse applied from the neck down to the toes daily with bathing/showering for 2 weeks  She will need a MRSA nasal screen rechecked in 2 weeks after decolonization  Skin breakdown   Assessment & Plan    · The patient was evaluated by the wound care team nurse during the hospitalization  · I appreciate the wound care team nurse's input  · Assessment and plan per the wound care nurse's note as follows:  Assessment:   Moisture associated skin damage sacrum and buttocks  Skin is pink and intact  Calmoseptine in place  On tube feeds, incontinent of liquid stool, per nursing staff  Contractures    On Low Air Loss surface, padding in place on bed rails  History of seizures  Bed bound      Plan:   1  Maintain low air loss surface in CCU and on transfer to [facility] to assist with moisture control/microclimate  2   Continue Calmoseptine/Calazime  3   Turn and reposition q 2 hours  4   Weekly follow up with wound care RN  Hypotension   Assessment & Plan    · Resolved with IV fluids     Transaminitis   Assessment & Plan    · Chronic transamintis  · Likely secondary to a medication effect  · Avoid all hepatotoxic agents  · Follow the LFT's (liver function tests) after discharge with her PCP    Results for Jorge Rosen (MRN [id number]) as of 1/17/2019 15:14   Ref   Range 1/16/2019 05:31   HEPATITIS A IGM ANTIBODY Latest Ref Range: Non-reactive, Equivocal-Suggest Recollect  Non-reactive   HEPATITIS B SURFACE ANTIGEN Latest Ref Range: Non-reactive, NonReactive - Confirmed  Non-reactive   HEPATITIS B CORE IGM ANTIBODY Latest Ref Range: Non-reactive  Non-reactive   HEPATITIS C ANTIBODY Latest Ref Range: Non-reactive  Non-reactive        Hypernatremia   Assessment & Plan    · Resolved with IV fluids  · Follow the sodium level after discharge with her PCP     Hypokalemia   Assessment & Plan    · Improved with potassium chloride supplementation  · Follow the potassium level and magnesium level after discharge with her PCP     Hyperammonemia (HCC)   Assessment & Plan    · Likely secondary to valproic acid  · Continue lactulose upon discharge for a goal of 2-3 soft bowel movements per day     Lactic acidosis   Assessment & Plan    · Resolved with IV fluids and with IV thiamine treatment  · No infectious etiology of the lactic acidosis was found with the following culture results:  Procedure Component Value - Date/Time   MRSA culture [[id number]] (Abnormal) Collected: 01/15/19 1431   Lab Status: Final result Specimen: Nares from Nose Updated: 01/17/19 1322    MRSA Culture Only Methicillin Resistant Staphylococcus aureus isolated (A)     Please note: This patient requires contact precautions  Respiratory Pathogen Profile, PCR [[id number]] (Normal) Collected: 01/15/19 1431   Lab Status: Final result Specimen: Nasopharyngeal from Nasopharyngeal Swab Updated: 01/16/19 1020    INFLU A/MATRIX GENE Not Detected    Influenza A/H1 Not Detected    Influenza A/H3 Not Detected    INFLUENZA B Not Detected    RSV A Not Detected    RSV B Not Detected    Coronavirus 229E Not Detected    Coronavirus OC43 Not Detected    Coronavirus NL63 Not Detected    Coronavirus HKU1 Not Detected    METAPNEUMOVIRUS Not Detected    RHINOVIRUS Not Detected    ADENOVIRUS Not Detected    PARAINFLUENZA 1 Not Detected    PARAINFLUENZA 2 Not Detected    PARAINFLUENZA 3 Not Detected    Parainfluenza 4 Not Detected    Human Bocavirus Not Detected    Chlamydophila pneumoniae Not Detected    Mycoplasma pneumoniae Not Detected   Urine culture [[id number]]    Lab Status: No result Specimen: Urine from Urine, Clean Catch    Blood culture #1 [[id number]] Collected: 01/14/19 1041   Lab Status: Preliminary result Specimen: Blood from Arm, Left Updated: 01/16/19 1601    Blood Culture No Growth at 48 hrs  Blood culture #2 [[id number]] Collected: 01/14/19 1041   Lab Status: Preliminary result Specimen: Blood from Arm, Left Updated: 01/16/19 1601    Blood Culture No Growth at 48 hrs  Discharging Physician / Practitioner: Max Restrepo DO  PCP: Luiz Valdez DO  Admission Date: 01/14/19  Discharge Date: 01/17/19      Consultations During Hospital Stay:  · Neurology    Procedures Performed:   · None    Significant Findings / Test Results:   Ct Head Without Contrast    Result Date: 1/14/2019  Impression: Chronic maxillary sinus mucosal disease  Otherwise, normal head CT  Workstation performed: [id number]     Ct Chest Abdomen Pelvis W Contrast    Result Date: 1/14/2019  Impression: 1    Admixed groundglass and nodular opacities in the superior segment left lower lobe  This could represent lingering sequelae of the largely resolved pneumonia seen in December or a new focal atypical infectious process  2   Well-circumscribed right upper-outer breast mass measuring 12 mm  Recommend further evaluation with diagnostic mammogram  3   Presence of abnormal air-fluid levels in the distal colon beyond the splenic flexure  No madai colonic wall thickening or surrounding inflammation  This pattern can be seen with viral enterocolitis, ileus, or stimulant laxative use  Note: Imaging follow-up reminder notification was scheduled in the electronic medical record  Workstation performed: [id number]     ECG 12 lead   Order: [id number]   Status:  Final result   Visible to patient:  No (Inaccessible in [address])   Next appt:  02/14/2019 at 10:00 AM in Neurology Rosie Mercedes MD)    Ref Range & Units 1/14/19 1143   Ventricular Rate BPM 92    Atrial Rate BPM 92    OH Interval ms 146    QRSD Interval ms 66    QT Interval ms 326    QTC Interval ms 403    P [place] degrees 75    QRS Axis degrees 80    T Wave Axis degrees 60    Narrative     Normal sinus rhythm  Biatrial enlargement  Abnormal ECG  When compared with ECG of 17-DEC-2018 02:48,  No significant change was found  Poor data quality, interpretation may be adversely affected  Confirmed by Kevin Amanda ([id number]) on 1/14/2019 12:31:25 PM      Specimen Collected: 01/14/19 11:43   Last Resulted: 01/14/19 12:31                   Outpatient Tests Requested:  · CBC with diff , CMP, Mag, Phos, Lactic acid level in 1 day and in 1 week  · Bilateral mammogram    Complications:  None    Reason for Admission:  Breakthrough seizure activity    Hospital Course:     Britt Persaud is a 41 y o  female patient who originally presented to the hospital on 1/14/2019 due to seizure activity    Please see above list of diagnoses and related plan for additional information       Condition at Discharge: stable     Discharge Day Visit / Exam: Subjective: The patient was seen and examined  The patient is doing better  The patient is mostly non-verbal   No seizure activity overnight  Vitals: Blood Pressure: 108/67 (01/17/19 0700)  Pulse: 93 (01/17/19 0700)  Temperature: 98 1 °F (36 7 °C) (01/17/19 0700)  Temp Source: Tympanic (01/17/19 0700)  Respirations: (!) 28 (01/17/19 0700)  Height: 5' (152 4 cm) (01/14/19 1600)  Weight - Scale: 48 6 kg (107 lb 2 3 oz) (01/16/19 0418)  SpO2: 98 % (01/17/19 0700)  Exam:   Physical Exam  General:  NAD, awake, mostly non-verbal, not oriented  HEENT:  NC/AT, mucous membranes moist  Neck:  Supple, No JVP elevation  CV:  + S1, + S2, RRR  Pulm:  Lung fields are CTA bilaterally  Abd:  Soft, Non-tender, Non-distended  Ext:  No clubbing/cyanosis/edema  Skin:  + Maculopapular facial rash involving the bilateral maxillary regions and involving the forehead      Discharge instructions/Information to patient and family:   See after visit summary for information provided to patient and family  Provisions for Follow-Up Care:  See after visit summary for information related to follow-up care and any pertinent home health orders  Disposition:     Antoine Horta Carson at [facility]      Planned Readmission:  No     Discharge Statement:  I spent 40 minutes discharging the patient  This time was spent on the day of discharge  I had direct contact with the patient on the day of discharge  Greater than 50% of the total time was spent examining patient, answering all patient questions, arranging and discussing plan of care with patient as well as directly providing post-discharge instructions  Additional time then spent on discharge activities  Discharge Medications:  See after visit summary for reconciled discharge medications provided to patient and family        ** Please Note: This note has been constructed using a voice recognition system **

## 2019-01-18 ENCOUNTER — TELEPHONE (OUTPATIENT)
Dept: NEUROLOGY | Facility: CLINIC | Age: 39
End: 2019-01-18

## 2019-01-18 NOTE — TELEPHONE ENCOUNTER
Received a call from Checo Kaplan /[facility] (she's the assistant director of nursing)     States the patient just [facility] to them [facility] for uncontrolled [facility], she reports the patient is back to base line and stable, no seizure activity since she's been back to them  fycompa 20 mg daily  depakote  250 mg q 8 hours  keppra 1000 BID  topamax 250 mg q 12    She's concerned that the patient doesn't have an appt with Dr Lawrence Gonzalez until 2/14 (no availability sooner)  Currently taking Phenobarbital 20 mg at bed  And they want to d/c it on Jan 31  She wanted to make sure Dr Lawrence Gonzalez is aware, and is questioning if he is okay with phenobarbital being d/c before patient is seen in our office  [phone] ([place]) ask for Checo Kaplan or charge nurse

## 2019-01-18 NOTE — TELEPHONE ENCOUNTER
Charge nurse nigel made aware  Akil Ruff in scheduling will add patient on to 1/22 @ 8am in [place]

## 2019-01-18 NOTE — TELEPHONE ENCOUNTER
Create an urgent add on slot on 1/22/2019 at [address] in [place]  I still want to go ahead with discontinuing phenobarbital because I'm worried that since it was started it causes periodic transaminitis

## 2019-01-19 LAB
BACTERIA BLD CULT: NORMAL
BACTERIA BLD CULT: NORMAL

## 2019-01-22 ENCOUNTER — TELEPHONE (OUTPATIENT)
Dept: NEUROLOGY | Facility: CLINIC | Age: 39
End: 2019-01-22

## 2019-01-22 ENCOUNTER — OFFICE VISIT (OUTPATIENT)
Dept: NEUROLOGY | Facility: CLINIC | Age: 39
End: 2019-01-22
Payer: MEDICARE

## 2019-01-22 VITALS
HEART RATE: 79 BPM | SYSTOLIC BLOOD PRESSURE: 99 MMHG | BODY MASS INDEX: 20.93 KG/M2 | HEIGHT: 60 IN | DIASTOLIC BLOOD PRESSURE: 59 MMHG

## 2019-01-22 DIAGNOSIS — G40.812 LENNOX-GASTAUT SYNDROME WITH TONIC SEIZURES (HCC): Primary | ICD-10-CM

## 2019-01-22 DIAGNOSIS — G40.419 OTHER GENERALIZED EPILEPSY, INTRACTABLE, WITHOUT STATUS EPILEPTICUS (HCC): ICD-10-CM

## 2019-01-22 PROCEDURE — 99215 OFFICE O/P EST HI 40 MIN: CPT | Performed by: PSYCHIATRY & NEUROLOGY

## 2019-01-22 PROCEDURE — 95977 ALYS CPLX CN NPGT PRGRMG: CPT | Performed by: PSYCHIATRY & NEUROLOGY

## 2019-01-22 RX ORDER — ACETAMINOPHEN 325 MG/1
650 TABLET ORAL EVERY 6 HOURS PRN
Status: ON HOLD | COMMUNITY
End: 2019-08-02 | Stop reason: SDUPTHER

## 2019-01-22 RX ORDER — TOPIRAMATE 50 MG/1
250 TABLET, FILM COATED ORAL EVERY 12 HOURS SCHEDULED
Qty: 300 TABLET | Refills: 5 | Status: SHIPPED | OUTPATIENT
Start: 2019-01-22 | End: 2019-08-22 | Stop reason: SDUPTHER

## 2019-01-22 NOTE — PROGRESS NOTES
Review of Systems    {LimROS-complete:34021}      Review of Systems   Constitutional: Negative  Negative for appetite change and fever  HENT: Negative  Negative for hearing loss, tinnitus, trouble swallowing and voice change  Eyes: Negative  Negative for photophobia and pain  Respiratory: Negative  Negative for shortness of breath  Cardiovascular: Negative  Negative for palpitations  Gastrointestinal: Negative  Negative for nausea and vomiting  Endocrine: Negative  Negative for cold intolerance and heat intolerance  Genitourinary: Negative  Negative for dysuria, frequency and urgency  Musculoskeletal: Negative  Negative for myalgias and neck pain  Skin: Negative  Negative for rash  Neurological: Positive for seizures  Negative for dizziness, tremors, syncope, facial asymmetry, speech difficulty, weakness, light-headedness, numbness and headaches  Hematological: Negative  Does not bruise/bleed easily  Psychiatric/Behavioral: Negative  Negative for confusion, hallucinations and sleep disturbance

## 2019-01-22 NOTE — TELEPHONE ENCOUNTER
Call from [facility]  MRI is scheduled w/anesthesia @[facility] 3/20 but appt w/Dr Ministerio An is scheduled 2/14    Aminata Patterson wants to know if we should resculed f/u for after MRI or ok to leave it as is    Please advise

## 2019-01-22 NOTE — LETTER
2019     Ty Ho DO  [address] [facility] [address]    Patient: Jakub Arias   YOB: 1980   Date of Visit: 2019       Dear Dr Bijan Matamoros:    Thank you for referring Christiano Alexis to me for evaluation  Below are my notes for this consultation  If you have questions, please do not hesitate to call me  I look forward to following your patient along with you  Sincerely,        John Paul Johnson MD        CC: No Recipients  John aPul Johnson MD  2019 11:37 PM  Sign at close encounter  Patient's Name: Jakub Arias   Patient's : 1980   Visit Type: follow-up  Referring MD / PCP:  Ty Ho DO     Assessment:  Ms Christiano Alexis is a 41 y o  woman with Adline Mola Syndrome, remote history of anoxic brain injury, h/o status epilepticus (extremely frequent tonic seizures during sleep), severe intellectual disability  She has a VNS due to intractable epilepsy  Ms Brynda Meigs epilepsy has been extremely difficult to control despite the use of 6 antiepileptic medications, complicated by significant side effects of lethargy, hepatic transaminitis, and hyperammoniemia  Her EEG studies are always atrocious with very frequent generalized tonic seizures  It seems that she requires topiramate for seizure prevention and valproic acid helps  It is unclear if Banzel, Levetiracetam, and Fycompa have been helpful  Phenobarbital was causing too much sedation, we need to wean her off of this medication  We also should wean her off of valproic acid due to hyperammoniemia and recurrent transaminitis  If she is still sedated after phenobarbital is reduced, we may need to reduce Fycompa back to 8mg/day  She is a candidate for Epidiolex and we had previously reviewed the side effects of Epidiolex (excessive sedation, transaminitis, GI side effects)    Will consider starting Epidiolex when liver function tests have become more normal     Given the intractability of her seizures, I have tried to maximize the duty cycle of her VNS by shortening the off time, this will increase the likelihood of battery depletion  Also we need to investigate the possibility of surgical intervention as palliative treatment (such as corpus callosotomy or finding a cortical lesion)  To do this, I will require an MRI brain study  Due to her inability to stay still for the study, she will need to have the MRI brain study done under general anesthesia and at a facility that we can turn her VNS on and off (preferably at the [place] [id number])  It is postulated that the excessive ammonia level is due to abnormal mitochondrial activity with valproate exposure  Due to improvement with seizure control with valproate, supplementation with L-carnitine is necessary to counteract hyperammoniemia  She should also continue with lactulose  Plan:   1 - continue Valproic acid 250mg/5mL give 5mL every 8 hours   2 - continue Banzel 400mg give 3 tabs twice a day   3 - continue Topiramate 250mg twice a day    4 - continue Levetiracetam 100mg/mL give 10mL twice a day   5 - Continue Fycompa 0 5mg/mL oral solution give 20mL (10mg) at bedtime   6 - continue phenobarbital elixir 20mg/5mL to 5mL (20mg) at bedtime until 1/31/2019 then stop  7  - continue with levocarnitine 1gm/10mL give 7 5mL three times a day   8 - MRI brain w/wo contrast (schedule in [facility] so that the VNS can be turned on and off for the study) patient will need clearance for general anesthesia  9 - check CMP, topiramate, valproic acid free level, ammonia level 1 week after phenobarbital is discontinued  10 - plan on starting Epidiolex (Cannabadiol) starting with 100mg/mL give 1mL twice a day for 1 week then 2mL twice a day    Parents will need to sign HIPAA Patient Authorization Form sign by a parent (fax signed form to [phone])  11 - scheduled follow-up on 2/14/2019 at [place] [address] in [facility] office to discuss medication adjustments    12 - call the office if there are more clusters of seizures  If so and Epidiolex is not yet available will change Levetiracetam to Briviact 100mg oral solution twice a day  Problem List Items Addressed This Visit        Nervous and Auditory    Lennox-Gastaut syndrome with tonic seizures (HCC) - Primary    Relevant Medications    topiramate (TOPAMAX) 50 MG tablet    Other Relevant Orders    Ammonia    Valproic acid level, free    Topiramate level    Comprehensive metabolic panel    MRI brain seizure wo and w contrast    Intractable epilepsy without status epilepticus ([facility] [address] )    Relevant Medications    topiramate (TOPAMAX) 50 MG tablet        Chief Complaint:    Chief Complaint     Seizures        HPI:    Jean Carlos Grewal is a 41 y o  unknown handed female here for follow-up evaluation of intractable epilepsy in the setting of LGS  Interval History 1/22/2019  We had reduced phenobarbital due to excessive lethargy  However, she was admitted to hospital again for breakthrough seizures (convulsive seizure)  TPM was increased to previously prescribed 250mg BID  Ammonia level is persistently elevated  She is here with Vera who does not know the patient well to give any information  I called [facility] [employer] and spoke with Prem Reis  On the day she was admitted to hospital, Lawrence Raymond was getting washed and dressed with progressively more frequent seizures flailing in bed, ripping the alarms, seizure recurred every minute to a minute and a half, including convulsive type of seizures, cluster of seizures lasted 30-40 minutes (stiffening)  It continued until she arrived at the ED  There was no fever associated with her seizure cluster  She is more alert with phenobarbital reduction  Her parents have not completed the HIPAA form for Epidiolex, they have not been around  They do not have the form in her chart    The seizure clusters eventually decreased to more baseline activity, witnessed 1-3 seizures during the day  She appears to be more awake, alert, will yell and talking voice, and communicate as best as she can, and participate in some activity  AED/side effects/compliance:  Valproic acid 250mg/5mL give 5mL three times a day   Banzel 400mg give 3 tabs twice a day   Topiramate 250mg tab twice a day   Levetiracetam 100mg/mL give 10mL twice a day   Fycompa 0 5mg/5mL give 20mL (10mg) at bedtime (oral solution)  Phenobarbital 20mg/5mL give 5mL (20mg) nightly     levocarnitine 1gm/10mL give 7 5mL three times a day and lactulose for VPA induced hyperammoniemia    Event/Seizure semiology:  Seizure semiology:  1  She was described to have drop attacks or spells with grunting sounds and falling to the floor  2  Her nurse commented on episodes of spasms or myoclonic jerking of the right arm  3  Generalized convulsions  4  Tonic seizures of eyes rolling back (mostly during sleep)    Prior Epilepsy History:  Collective epilepsy history 11/6/2014 was previously evaluated by Dr Rosa Sorensen including a hospitalization in February 2013 for status epilepticus, in the setting of missed doses of AEDs due to refusal to eat  She subsequently required anesthetic induced coma to stop her seizures and PEG tube was placed  She was seen almost every month for management of her seizures up until November 2013  She has medically refractory seizures and had a VNS placed possibly in the early 2000s and a generator changed in 2011  Unknown AEDs that were tried but felbamate is listed as an allergy  In 2011, her AEDs were clonazepam, levetiracetam, Depakote and Lamictal  Dr Rosa Sorensen had started [facility] in 2011  In 2012, Tiajuana Beans was added with the reduction of clonazepam  There were difficulty with documenting seizure frequency; but seizures were no longer daily occurrences but there were clusters of seizures  There would be good periods and bad periods of seizure frequency   Dr Rosa Sorensen had been increasing the duty cycle of the VNS over the course of 2012 to 2013  Sometime between August 2013 and October 2013, topiramate was introduced  She was in status epilepticus in 2013, she was on Keppra, Banzel, Onfi, and Lamictal  She had an EEG at some point that showed multifocal spike-wave and background attenuation  She has intermittent agitation and day time somnolence  When she was hospitalized for status epilepticus, she was started on methylphenidate to help wake her up  Intake history November 2014:  VPA level 127  Her Valproic Acid dose was previously 250mg q6hrs based on Dr Phil Valenzuela notes  Since July 2014, she has been receiving VPA 500mg q6hrs  She has not been hospitalized since September 2013  She was previously ambulatory with therapy  She spends most of her time sleeping for the past couple of months  She has also been receiving lactulose for elevated ammonia levels  (older drugs VPA, LVT, LMG, newer drugs added on since 2011 RFN, Onfi, TPM)     Summary of 2015: We decreased VPA from TDD 2000mg to 1000mg with increased agitation/combativeness, alternating days of exhaustion (no behavioral specialist has been involved)  are randomly reported by multiple staff members  Seizures are typically reported by CNA's or her one to one on the 3PM to 11PM shift  Seizures are described a brief events of eyes rolling back and arms going up in the air, followed by hair pulling or slapping herself  These seizures were reported as either sporadic or every other day episodes  There have been no documented grand mal seizures or drop attacks  She refuses to wear safety helmet, rips at her hair, and attempts to flip herself out of her chair and bed  Edel Chaidez can walk briefly but walks on her toes, she frequently will allow herself to fall down when she does not want to walk  It does not appear that methylphenidate has been useful in maintaining her level of wakefulness during the day  VNS generator change on 11/3/2015   The Model 103 (serial [id number]) was replaced with MVP Interactive Model 105, serial [id number]  Weaned off of levetiracetam due to multiple medications and agitation; by the end of 2016, she was down to -500  Summary of 2016:  Started with RFN 8630-9459, -250, CLB 0 5-0 5-25,  QID, -200 attempted to wean off of LEV and reduced the dose of VPA given that there were no reports of seizures and she was sedated, along with elevated ammonia levels  It was unclear as to how effective LEV was for her seizure control  When she missed a dose of TPM in September 2016, she had multiple seizures  We attempted to compensate for increase in seizures by increasing Onfi to 20-20; however, it seems that it made her more sedated  Summary of 2017  RFN 3803-1087, -250, Onfi 20-20, -200,  q8hrs  She has been more somnolent  She continues to have tonic seizures when she is asleep, eyes rolling body, arms will tense up with some twitching, last a few seconds, but will recur  There are no seizures during the daytime  We attempted to wean off of VPA due to ammonia / somnolence; but there was an increase in tonic seizures (tonic stiffness, eyes rolling upwards, and subtle arm (right?) jerking)  She was admitted to hospital 10/10 to 10/26/2017, due to seizures, was put on continuous video EEG monitoring to determine her seizure type, seizure frequency, and rapid medication adjustments  The increased in seizure frequency was likely due to an underlying infection, but also she likely had frequent seizures during sleep which did not cause much injury to the patient  A number of medication adjustments were attempted including discontinuation of lamotrigine due to concern about it worsening seizures, reinstitution of levetiracetam, attempted discontinuation of valproic acid, attempted reduction in Onfi, and starting Fycompa  The patient's seizures were mostly based during sleep, tonic seizures    When valproic acid was discontinued there was an increased number of seizures  Valproic acid was subsequently reinstituted  Summary of 2018  Started with RFN 6875-8142, PER 8, LEV 8087-7240, CLB 5-15, -250,  TID  Due to excessive somnolence Onfi was weaned off  Phenobarbital   She was on continuous EEG monitoring when she was in hospital for PNA that showed very frequent tonic seizures during sleep  VPA is causing hyperammoniemia  She has had more admissions for somnolence / lethargy, VPA was reduced for transaminitis  She was tested for inborn error of metabolism with plasma amino acid, urine organic acid, and acylcarnitine levels  There were nonspecific elevations in some amino acid or organic acid but no pattern consistent with a specific inborn error of metabolism  Elevated carnitine level was consistent with supplementation  Higher doses of phenobarbital may also contribute to sedation, phenobarbital was reduced to 20mg but on follow-up she was on 20mL of oral solution (80mg / day)  There is variable reporting in the frequency of tonic seizures (these are the eyes are rolling back and shaking, then stop, then happen again in 10-15 minutes)  Her level of alertness is also variable with erratic sleep cycle  Interval History 1/10/2019   TID, RFN 6149-9940, -200, LEV 5957-8176, PER 10, PB 40 qHS  In December 2018, Edy was admitted to hospital for hypoxia and fever found to have PNA with pleural effusion, septic shock and aspiration, she was subsequently intubated  She had an EEG that captured recurrent tonic seizures during sleep but this is consistently found in all of her other EEG studies  Phenobarbital was decreased to reduce sedation  Fycompa was increased to 10mg at bedtime  She has returned to her SNF, parents have noticed increase in seizure frequency (tonic seizures)  She also had an incrase in AST/ALT levels  Her albumin was 1 3-2 2 in December 2018      She is here with her CNA who has known her for a long time  Her CNA reports that Rebeka Markham is no longer Rebeka Markham, she is essentially bed ridden  She does not eat and does not walk  Rebeka Markham usually participate in activities, walking, and eating food (if she was at a [facility] she would be able to eat a hamburger without difficulty)  I was able to speak to her father Elbert Wisdom and he reported that he too saw a significant decline in mental status  There was a phone call from [place] [address] reporting an increase in seizure activity (brief episodes tonic-myoclonic jerking); but subsequent reports did not notice an increase in activity  She has not had a convulsive seizure  I spoke with [facility] unit nurse at [place] [address], but it is his first day there and he is not familiar with Rebeka Markham  Special Features  Status epilepticus: yes  Self Injury Seizures: No  Precipitating Factors: menstrual cycle? ?     Epilepsy Risk Factors:  Abnormal pregnancy: unknown  Abnormal birth/: unknown  Abnormal Development: unknown developmental history  Febrile seizures, simple: unknown  Febrile seizures, complex: unknown  CNS infection: No  Mental retardation: Yes  Cerebral palsy: Yes  Head injury (moderate/severe): possibly anoxic brain injury  CNS neoplasm: No  CNS malformation: No  Neurosurgical procedure: No  Stroke: No  Alcohol abuse: No  Drug abuse: No  Family history Sz/epilepsy: unknown    Prior AEDs:  Rufinamide  Clobazam (excessive sedation)  Clonazepam  Levetiracetam (unclear if beneficial)  Lamotrigine (unclear if beneficial)  Topiramate (missed doses caused breakthrough seizures)  Valproate (as medication was weaned off, there were increased seizures)  Fycompa  Felbamate (listed as a drug allergy)  Phenobarbital (contributing to sedation)    Prior workup:  x   Imaging:  CT head 2013  Normal CT of brain    CT head 2018  No acute intracranial pathology    EEGs:  Continuous video EEG monitoring 10/13  10/15/2017  Frequent generalized spike/polyspike-slow wave complexes during sleep  At times there are independent right more than left midtemporal spikes and bitemporal spikes    Innumerable generalized tonic seizures during sleep, generalized 1 Hz period discharges, that would become continuous for 30 seconds or generalized rhythmic alpha-beta discharges, associated with patient becoming rigid, tonic posturing with arms elevated and tense with subtle jerking of the upper body, eyes opening with upward deviation  Dr Selene Keating reported 4 ictal patterns:  1  1-3 seconds of diffuse electrodecrement then 2-7 seconds of fast activity then 2Hz spike wave discharges (no clinical manifestation)  2  same as #1, clinically accompanied by posturing of the arms, then clonic jerking  3  diffuse low fast activity without progressing to spike-wave discharges  4  2 Hz generalized discharges for 2-3 minutes with no clonical manifestations  By 10/15/2017  the tonic clonic seizures were less frequent    Continuous video EEG monitoring 10/18  10/25/2017  Diffuse background slowing with bursts of arrhythmic generalized spike wave discharges, with shifting lateralization, and independent left and  right temporal spikes  Mild eyelid myoclonia associated with brief bursts of 2-2 5 Hz generalized discharges  Tonic seizures with eelctrodecrement  There were innumerable tonic seizures; however by 10/25/2017 the frequency of these seizures did decrease in frequency  Continuous video EEG monitoring 12/1-12/5/2017  There are innumerable tonic seizures that are generally less than one minute in duration (30-40 seconds), these consists of subtle eye opening and tonic stiffening of arms, if longer then whole body stiffening with clonic jerking movements  These almost always occur exclusively out of sleep  These consist of 2-2 5 Hz generalized spike-slow wave complexes with generalized attenuation of activity (desynchronization) or paroxysmal fast activity  Per 24 hours count of seizures could be      12/19/2018 routine study  Frequent recurrent tonic seizures associated with paroxysmal generalized fast activity then generalized rhythmic discharges associated with eyes upward deviation, and myoclonic/clonic jerking of the upper body, excess beta activity  Labs:  Component Ammonia   Latest Ref Rng & Units 11 - 35 umol/L   12/14/2018 37 (H)   12/19/2018 22   12/20/2018 31     Component      Latest Ref Rng & Units 1/14/2019 1/16/2019 1/17/2019   WBC      4 31 - 10 16 Thousand/uL  7 31 7 00   Red Blood Cell Count      3 81 - 5 12 Million/uL  3 59 (L) 3 92   Hemoglobin      11 5 - 15 4 g/dL  11 9 12 9   HCT      34 8 - 46 1 %  38 3 41 6   Platelet Count      387 - 390 Thousands/uL  211 126 (L)   Sodium      136 - 145 mmol/L  141 139   Potassium      3 5 - 5 3 mmol/L  4 0 4 2   Chloride      100 - 108 mmol/L  107 104   CO2      21 - 32 mmol/L  20 (L) 22   Anion Gap      4 - 13 mmol/L  14 (H) 13   BUN      5 - 25 mg/dL  4 (L) 3 (L)   Creatinine      0 60 - 1 30 mg/dL  0 40 (L) 0 46 (L)   Glucose, Random      65 - 140 mg/dL  129 149 (H)   Calcium      8 3 - 10 1 mg/dL  8 4 9 1   AST      5 - 45 U/L  152 (H) 118 (H)   ALT      12 - 78 U/L  94 (H) 88 (H)   Alkaline Phosphatase      46 - 116 U/L  136 (H) 158 (H)   Total Protein      6 4 - 8 2 g/dL  6 4 7 2   Albumin      3 5 - 5 0 g/dL  2 5 (L) 2 8 (L)   TOTAL BILIRUBIN      0 20 - 1 00 mg/dL  0 40 0 30   eGFR      ml/min/1 73sq m  133 127   Ammonia      11 - 35 umol/L 58 (H) 47 (H)    PHENOBARBITAL LEVEL      15 0 - 40 0 ug/mL 8 2 (L)       General exam   Blood pressure 99/59, pulse 79, height 5' (1 524 m)    Appearance: excessively sedated, nonverbal  Carotids: n/a  Cardiovascular: regular rate and rhythm  Pulmonary: unable to hear  Extremities: no edema    HEENT: anicteric   Fundoscopy: not assessed    Mental status  Orientation: nonverbal  Fund of Knowledge: nonverbal   Attention and Concentration: nonverbal  Current and Remote Memory:nonverbal  Language: nonverbal    Cranial Nerves  CN 1: not tested  CN 2: pupils equal round reactive to direct and consenual light   CN 3, 4, 6: dysconjugate eyes, mild nystagmus with movement  CN 5:not assessed  CN 7:muscles of facial expression are symmetric and corneal reflex is intact symmetrically  CN 8:not assessed  CN 9, 10:not assessed  CN 11:not assessed  CN 12:not assessed    Motor:  Bulk, Tone: thin muscle build, relatively hypotonic tone  Pronation: not assessed  Strength: patient did not participate in strength testing    Sensory:  Lighttouch: not assessed due to cognitive impairment    Coordination: Unable to test    Reflexes: not assessed    Past Medical/Surgical History:  Patient Active Problem List   Diagnosis    Lennox-Gastaut syndrome with tonic seizures (HCC)    Lactic acidosis    Underweight    Intellectual disability    Hyperammonemia (HCC)    Hypokalemia    Hypernatremia    Osteoporosis    Onychomycosis    Lethargy    Hyperkeratosis    Intractable epilepsy without status epilepticus (HCC)    Somnolence    Transaminitis    Hypotension    Incontinence    Skin breakdown    MRSA colonization    Hypoalbuminemia    Right atrial enlargement    Sedated due to multiple medications    Metabolic acidosis, increased anion gap (IAG)    Breast mass     Past Medical History:   Diagnosis Date    ADHD     Anoxic brain damage (HCC)     Autistic disorder     Hyperammonemia (HCC)     Hyperkeratosis     Hypotension     Intellectual disability     Intellectual disability     Lennox-Gastaut syndrome with tonic seizures (HCC)     Lethargy     Liver enzyme elevation     Onychomycosis     Osteoporosis     Osteoporosis     Psychiatric disorder     anxiety     Past Surgical History:   Procedure Laterality Date    ABDOMINAL SURGERY      CARDIAC PACEMAKER PLACEMENT      IR THORACENTESIS  12/17/2018    JEJUNOSTOMY FEEDING TUBE      PEG TUBE PLACEMENT       Past Psychiatric History:  Behavioral agitation    Medications:    Current Outpatient Prescriptions:     acetaminophen (TYLENOL) 325 mg tablet, Take 650 mg by mouth every 6 (six) hours as needed for mild pain, Disp: , Rfl:     chlorhexidine (HIBICLENS) 4 % external liquid, Apply 1 application topically daily for 14 days, Disp: 120 mL, Rfl: 0    enoxaparin (LOVENOX) 40 mg/0 4 mL, Inject 0 4 mL (40 mg total) under the skin every 24 hours, Disp: , Rfl: 0    lactulose 20 g/30 mL, 30 mL (20 g total) by Per G Tube route 3 (three) times a day, Disp: , Rfl: 0    levETIRAcetam (KEPPRA) 100 mg/mL oral solution, 10 mL (1,000 mg total) by Per G Tube route every 12 (twelve) hours, Disp: , Rfl: 0    levOCARNitine (CARNITOR) 1 g/10 mL solution, 7 5 mL (750 mg total) by Per G Tube route 3 (three) times a day, Disp: 474 mL, Rfl: 7    magnesium hydroxide (MILK OF MAGNESIA) 400 mg/5 mL oral suspension, Take by mouth daily as needed for constipation, Disp: , Rfl:     MELATONIN PO, 6 mg by Per G Tube route daily at bedtime, Disp: , Rfl:     Multiple Vitamins-Minerals (MULTIVITAMIN WITH IRON-MINERALS) liquid, 5 mL by Per G Tube route daily, Disp: , Rfl:     mupirocin (BACTROBAN) 2 % nasal ointment, into each nostril 2 (two) times a day for 5 days For mrsa colonization, Disp: 10 g, Rfl: 0    ondansetron (ZOFRAN) 4 mg tablet, 4 mg by Per G Tube route every 8 (eight) hours as needed for nausea or vomiting  , Disp: , Rfl:     perampanel (FYCOMPA) oral suspension, 20 mL (10 mg total) by Per OG Tube route daily, Disp: 680 mL, Rfl: 5    PHENobarbital 20 mg/5 mL elixir, Take 5 mL (20 mg total) by mouth daily at bedtime for 14 days Then stop this medication  , Disp: 150 mL, Rfl: 0    Probiotic Product (ALEXANDER-BID PROBIOTIC PO), 1 tablet by Per G Tube route daily  , Disp: , Rfl:     rufinamide (BANZEL) 400 mg tablet, 3 tablets (1,200 mg total) by Per G Tube route 2 (two) times a day, Disp: 180 tablet, Rfl: 5    topiramate (TOPAMAX) 50 MG tablet, 5 tablets (250 mg total) by Per G Tube route every 12 (twelve) hours, Disp: 300 tablet, Rfl: 5    valproic acid (DEPAKENE) 250 MG/5ML soln, 5 mL (250 mg total) by Per G Tube route every 8 (eight) hours, Disp: 450 mL, Rfl: 5    Allergies: Allergies   Allergen Reactions    Felbamate        Family history:  History reviewed  No pertinent family history  There is no family history of seizure, epilepsy or developmental delay  Social History  Living situation:  Resident of [facility]  Social History   Substance Use Topics    Smoking status: Never Smoker    Smokeless tobacco: Never Used    Alcohol use No      Review of Systems  A review of at least 12 organ/systems was obtained by the medical assistant and reviewed by me, including additional positives/negatives:   Neurological: Positive for seizures  Negative for dizziness, tremors, syncope, facial asymmetry, speech difficulty, weakness, light-headedness, numbness and headaches  Decision making was of high-complexity due to the patient's high risk condition (seizures), psychiatric and neuropsychological comorbidities, behavioral problems, memory and cognitive problems and medication side effects  The total amount of time spent with the patient was 60 minutes  More than 50% of this time was devoted to counseling and coordination of care  Issues addressed during this clinic visit included overall management, medication counseling or monitoring (including adverse effects, side effects and risks of antiepileptic medications)     Start time: 8:30AM  End time: 9:30AM    Neurology/Epilepsy Procedure Note    VNS Interrogation and reprogramming performed on 1/22/2019    Model: M105  S/N: [id number]  Date of implant: 11/3/2015    Current settings:  Output Current 2 mAmp   Signal Frequency 30 Hz   Pulse Width 500 microsec   Signal On Time 21 sec   Signal Off Time 0 8 min     Magnet settings:  Mag Output 2 25 mAmp   Pulse Width 500 microsec   Mag On Time 60 sec     Diagnostics:  Communication OK  Output current 2mAmp  Lead Impedance OK  Impedance value 1684 Ohms  IFI No  Battery indicator 25-50%    Reprogrammed settings:  Output Current 2 mAmp   Signal Frequency 20 Hz   Pulse Width 250 microsec   Signal On Time 14 sec   Signal Off Time 0 5 min     Magnet settings:  Mag Output 2 25 mAmp   Pulse Width 500 microsec   Mag On Time 60 sec     Diagnostics:  Lifetime Magnet activation 7 (last time 9/26/2016)  % stimulation 36% changed to 41%

## 2019-01-22 NOTE — PROGRESS NOTES
Patient's Name: Reinaldo Oshea   Patient's : 1980   Visit Type: follow-up  Referring MD / PCP:  Martín Kyle DO     Assessment:  Ms Rikki Joyner is a 41 y o  woman with Glorietta Alexy Syndrome, remote history of anoxic brain injury, h/o status epilepticus (extremely frequent tonic seizures during sleep), severe intellectual disability  She has a VNS due to intractable epilepsy  Ms Rufino Fuller epilepsy has been extremely difficult to control despite the use of 6 antiepileptic medications, complicated by significant side effects of lethargy, hepatic transaminitis, and hyperammoniemia  Her EEG studies are always atrocious with very frequent generalized tonic seizures  It seems that she requires topiramate for seizure prevention and valproic acid helps  It is unclear if Banzel, Levetiracetam, and Fycompa have been helpful  Phenobarbital was causing too much sedation, we need to wean her off of this medication  We also should wean her off of valproic acid due to hyperammoniemia and recurrent transaminitis  If she is still sedated after phenobarbital is reduced, we may need to reduce Fycompa back to 8mg/day  She is a candidate for Epidiolex and we had previously reviewed the side effects of Epidiolex (excessive sedation, transaminitis, GI side effects)  Will consider starting Epidiolex when liver function tests have become more normal     Given the intractability of her seizures, I have tried to maximize the duty cycle of her VNS by shortening the off time, this will increase the likelihood of battery depletion  Also we need to investigate the possibility of surgical intervention as palliative treatment (such as corpus callosotomy or finding a cortical lesion)  To do this, I will require an MRI brain study    Due to her inability to stay still for the study, she will need to have the MRI brain study done under general anesthesia and at a facility that we can turn her VNS on and off (preferably at the [address])  It is postulated that the excessive ammonia level is due to abnormal mitochondrial activity with valproate exposure  Due to improvement with seizure control with valproate, supplementation with L-carnitine is necessary to counteract hyperammoniemia  She should also continue with lactulose  Plan:   1 - continue Valproic acid 250mg/5mL give 5mL every 8 hours   2 - continue Banzel 400mg give 3 tabs twice a day   3 - continue Topiramate 250mg twice a day    4 - continue Levetiracetam 100mg/mL give 10mL twice a day   5 - Continue Fycompa 0 5mg/mL oral solution give 20mL (10mg) at bedtime   6 - continue phenobarbital elixir 20mg/5mL to 5mL (20mg) at bedtime until 1/31/2019 then stop  7  - continue with levocarnitine 1gm/10mL give 7 5mL three times a day   8 - MRI brain w/wo contrast (schedule in [facility] so that the VNS can be turned on and off for the study) patient will need clearance for general anesthesia  9 - check CMP, topiramate, valproic acid free level, ammonia level 1 week after phenobarbital is discontinued  10 - plan on starting Epidiolex (Cannabadiol) starting with 100mg/mL give 1mL twice a day for 1 week then 2mL twice a day  Parents will need to sign HIPAA Patient Authorization Form sign by a parent (fax signed form to [phone])  11 - scheduled follow-up on 2/14/2019 at [place] [id number] in [facility] Neurology office to discuss medication adjustments    12 - call the office if there are more clusters of seizures  If so and Epidiolex is not yet available will change Levetiracetam to Briviact 100mg oral solution twice a day      Problem List Items Addressed This Visit        Nervous and Auditory    Lennox-Gastaut syndrome with tonic seizures (HCC) - Primary    Relevant Medications    topiramate (TOPAMAX) 50 MG tablet    Other Relevant Orders    Ammonia    Valproic acid level, free    Topiramate level    Comprehensive metabolic panel    MRI brain seizure wo and w contrast Intractable epilepsy without status epilepticus ([facility] [address] )    Relevant Medications    topiramate (TOPAMAX) 50 MG tablet        Chief Complaint:    Chief Complaint     Seizures        HPI:    Jakub Arias is a 41 y o  unknown handed female here for follow-up evaluation of intractable epilepsy in the setting of LGS  Interval History 1/22/2019  We had reduced phenobarbital due to excessive lethargy  However, she was admitted to hospital again for breakthrough seizures (convulsive seizure)  TPM was increased to previously prescribed 250mg BID  Ammonia level is persistently elevated  She is here with Vera who does not know the patient well to give any information  I called [facility] and spoke with Ashley Narayna  On the day she was admitted to hospital, [place] was getting washed and dressed with progressively more frequent seizures flailing in bed, ripping the alarms, seizure recurred every minute to a minute and a half, including convulsive type of seizures, cluster of seizures lasted 30-40 minutes (stiffening)  It continued until she arrived at the ED  There was no fever associated with her seizure cluster  She is more alert with phenobarbital reduction  Her parents have not completed the HIPAA form for Epidiolex, they have not been around  They do not have the form in her chart  The seizure clusters eventually decreased to more baseline activity, witnessed 1-3 seizures during the day  She appears to be more awake, alert, will yell and talking voice, and communicate as best as she can, and participate in some activity      AED/side effects/compliance:  Valproic acid 250mg/5mL give 5mL three times a day   Banzel 400mg give 3 tabs twice a day   Topiramate 250mg tab twice a day   Levetiracetam 100mg/mL give 10mL twice a day   Fycompa 0 5mg/5mL give 20mL (10mg) at bedtime (oral solution)  Phenobarbital 20mg/5mL give 5mL (20mg) nightly     levocarnitine 1gm/10mL give 7 5mL three times a day and lactulose for VPA induced hyperammoniemia    Event/Seizure semiology:  Seizure semiology:  1  She was described to have drop attacks or spells with grunting sounds and falling to the floor  2  Her nurse commented on episodes of spasms or myoclonic jerking of the right arm  3  Generalized convulsions  4  Tonic seizures of eyes rolling back (mostly during sleep)    Prior Epilepsy History:  Collective epilepsy history 11/6/2014 was previously evaluated by Dr Red Husbands including a hospitalization in February 2013 for status epilepticus, in the setting of missed doses of AEDs due to refusal to eat  She subsequently required anesthetic induced coma to stop her seizures and PEG tube was placed  She was seen almost every month for management of her seizures up until November 2013  She has medically refractory seizures and had a VNS placed possibly in the early 2000s and a generator changed in 2011  Unknown AEDs that were tried but felbamate is listed as an allergy  In 2011, her AEDs were clonazepam, levetiracetam, Depakote and Lamictal  Dr Red Husbands had started [facility] in 2011  In 2012, Joe Mule was added with the reduction of clonazepam  There were difficulty with documenting seizure frequency; but seizures were no longer daily occurrences but there were clusters of seizures  There would be good periods and bad periods of seizure frequency  Dr Red Husbands had been increasing the duty cycle of the VNS over the course of 2012 to 2013  Sometime between August 2013 and October 2013, topiramate was introduced  She was in status epilepticus in 2013, she was on Keppra, Banzel, Onfi, and Lamictal  She had an EEG at some point that showed multifocal spike-wave and background attenuation  She has intermittent agitation and day time somnolence  When she was hospitalized for status epilepticus, she was started on methylphenidate to help wake her up  Intake history November 2014:  VPA level 127    Her Valproic Acid dose was previously 250mg q6hrs based on Dr Vin Cervantes notes  Since July 2014, she has been receiving VPA 500mg q6hrs  She has not been hospitalized since September 2013  She was previously ambulatory with therapy  She spends most of her time sleeping for the past couple of months  She has also been receiving lactulose for elevated ammonia levels  (older drugs VPA, LVT, LMG, newer drugs added on since 2011 RFN, Onfi, TPM)     Summary of 2015: We decreased VPA from TDD 2000mg to 1000mg with increased agitation/combativeness, alternating days of exhaustion (no behavioral specialist has been involved)  are randomly reported by multiple staff members  Seizures are typically reported by CNA's or her one to one on the 3PM to 11PM shift  Seizures are described a brief events of eyes rolling back and arms going up in the air, followed by hair pulling or slapping herself  These seizures were reported as either sporadic or every other day episodes  There have been no documented grand mal seizures or drop attacks  She refuses to wear safety helmet, rips at her hair, and attempts to flip herself out of her chair and bed  Rozina Driscoll can walk briefly but walks on her toes, she frequently will allow herself to fall down when she does not want to walk  It does not appear that methylphenidate has been useful in maintaining her level of wakefulness during the day  VNS generator change on 11/3/2015  The Model 103 (serial [id number]) was replaced with Cyberonic Model 105, serial [id number]  Weaned off of levetiracetam due to multiple medications and agitation; by the end of 2016, she was down to -500  Summary of 2016:  Started with RFN 3821-3460, -250, CLB 0 5-0 5-25,  QID, -200 attempted to wean off of LEV and reduced the dose of VPA given that there were no reports of seizures and she was sedated, along with elevated ammonia levels  It was unclear as to how effective LEV was for her seizure control    When she missed a dose of TPM in September 2016, she had multiple seizures  We attempted to compensate for increase in seizures by increasing Onfi to 20-20; however, it seems that it made her more sedated  Summary of 2017  RFN [id number], -250, Onfi 20-20, -200,  q8hrs  She has been more somnolent  She continues to have tonic seizures when she is asleep, eyes rolling body, arms will tense up with some twitching, last a few seconds, but will recur  There are no seizures during the daytime  We attempted to wean off of VPA due to ammonia / somnolence; but there was an increase in tonic seizures (tonic stiffness, eyes rolling upwards, and subtle arm (right?) jerking)  She was admitted to hospital 10/10 to 10/26/2017, due to seizures, was put on continuous video EEG monitoring to determine her seizure type, seizure frequency, and rapid medication adjustments  The increased in seizure frequency was likely due to an underlying infection, but also she likely had frequent seizures during sleep which did not cause much injury to the patient  A number of medication adjustments were attempted including discontinuation of lamotrigine due to concern about it worsening seizures, reinstitution of levetiracetam, attempted discontinuation of valproic acid, attempted reduction in Onfi, and starting Fycompa  The patient's seizures were mostly based during sleep, tonic seizures  When valproic acid was discontinued there was an increased number of seizures  Valproic acid was subsequently reinstituted  Summary of 2018  Started with RFN [id number], PER 8, LEV 5433-0871, CLB 5-15, -250,  TID  Due to excessive somnolence Onfi was weaned off  Phenobarbital   She was on continuous EEG monitoring when she was in hospital for PNA that showed very frequent tonic seizures during sleep  VPA is causing hyperammoniemia  She has had more admissions for somnolence / lethargy, VPA was reduced for transaminitis    She was tested for inborn error of metabolism with plasma amino acid, urine organic acid, and acylcarnitine levels  There were nonspecific elevations in some amino acid or organic acid but no pattern consistent with a specific inborn error of metabolism  Elevated carnitine level was consistent with supplementation  Higher doses of phenobarbital may also contribute to sedation, phenobarbital was reduced to 20mg but on follow-up she was on 20mL of oral solution (80mg / day)  There is variable reporting in the frequency of tonic seizures (these are the eyes are rolling back and shaking, then stop, then happen again in 10-15 minutes)  Her level of alertness is also variable with erratic sleep cycle  Interval History 1/10/2019   TID, RFN 4236-7653, -200, LEV 1978-2501, PER 10, PB 40 qHS  In December 2018, Surjit Coleman was admitted to hospital for hypoxia and fever found to have PNA with pleural effusion, septic shock and aspiration, she was subsequently intubated  She had an EEG that captured recurrent tonic seizures during sleep but this is consistently found in all of her other EEG studies  Phenobarbital was decreased to reduce sedation  Fycompa was increased to 10mg at bedtime  She has returned to her SNF, parents have noticed increase in seizure frequency (tonic seizures)  She also had an incrase in AST/ALT levels  Her albumin was 1 3-2 2 in December 2018  She is here with her CNA who has known her for a long time  Her CNA reports that Surjit Coleman is no longer Surjit Coleman, she is essentially bed ridden  She does not eat and does not walk  Surjit Coleman usually participate in activities, walking, and eating food (if she was at a [facility] she would be able to eat a hamburger without difficulty)  I was able to speak to her father Brittani Lai and he reported that he too saw a significant decline in mental status    There was a phone call from Nahid Tsai reporting an increase in seizure activity (brief episodes tonic-myoclonic jerking); but subsequent reports did not notice an increase in activity  She has not had a convulsive seizure  I spoke with Kyung Kanner unit nurse at [facility], but it is his first day there and he is not familiar with Sagar Karimielias  Special Features  Status epilepticus: yes  Self Injury Seizures: No  Precipitating Factors: menstrual cycle? ? Epilepsy Risk Factors:  Abnormal pregnancy: unknown  Abnormal birth/: unknown  Abnormal Development: unknown developmental history  Febrile seizures, simple: unknown  Febrile seizures, complex: unknown  CNS infection: No  Mental retardation: Yes  Cerebral palsy: Yes  Head injury (moderate/severe): possibly anoxic brain injury  CNS neoplasm: No  CNS malformation: No  Neurosurgical procedure: No  Stroke: No  Alcohol abuse: No  Drug abuse: No  Family history Sz/epilepsy: unknown    Prior AEDs:  Rufinamide  Clobazam (excessive sedation)  Clonazepam  Levetiracetam (unclear if beneficial)  Lamotrigine (unclear if beneficial)  Topiramate (missed doses caused breakthrough seizures)  Valproate (as medication was weaned off, there were increased seizures)  Fycompa  Felbamate (listed as a drug allergy)  Phenobarbital (contributing to sedation)    Prior workup:  x   Imaging:  CT head 2013  Normal CT of brain    CT head 2018  No acute intracranial pathology    EEGs:  Continuous video EEG monitoring 10/13 - 10/15/2017  Frequent generalized spike/polyspike-slow wave complexes during sleep  At times there are independent right more than left midtemporal spikes and bitemporal spikes    Innumerable generalized tonic seizures during sleep, generalized 1 Hz period discharges, that would become continuous for 30 seconds or generalized rhythmic alpha-beta discharges, associated with patient becoming rigid, tonic posturing with arms elevated and tense with subtle jerking of the upper body, eyes opening with upward deviation      Dr Brittany Yarbrough reported 4 ictal patterns:  1 - 1-3 seconds of diffuse electrodecrement then 2-7 seconds of fast activity then 2Hz spike wave discharges (no clinical manifestation)  2 - same as #1, clinically accompanied by posturing of the arms, then clonic jerking  3 - diffuse low fast activity without progressing to spike-wave discharges  4 - 2 Hz generalized discharges for 2-3 minutes with no clonical manifestations  By 10/15/2017 - the tonic clonic seizures were less frequent    Continuous video EEG monitoring 10/18 - 10/25/2017  Diffuse background slowing with bursts of arrhythmic generalized spike wave discharges, with shifting lateralization, and independent left and  right temporal spikes  Mild eyelid myoclonia associated with brief bursts of 2-2 5 Hz generalized discharges  Tonic seizures with eelctrodecrement  There were innumerable tonic seizures; however by 10/25/2017 the frequency of these seizures did decrease in frequency  Continuous video EEG monitoring 12/1-12/5/2017  There are innumerable tonic seizures that are generally less than one minute in duration (30-40 seconds), these consists of subtle eye opening and tonic stiffening of arms, if longer then whole body stiffening with clonic jerking movements  These almost always occur exclusively out of sleep  These consist of 2-2 5 Hz generalized spike-slow wave complexes with generalized attenuation of activity (desynchronization) or paroxysmal fast activity  Per 24 hours count of seizures could be      12/19/2018 routine study  Frequent recurrent tonic seizures associated with paroxysmal generalized fast activity then generalized rhythmic discharges associated with eyes upward deviation, and myoclonic/clonic jerking of the upper body, excess beta activity      Labs:  Component Ammonia   Latest Ref Rng & Units 11 - 35 umol/L   12/14/2018 37 (H)   12/19/2018 22   12/20/2018 31     Component      Latest Ref Rng & Units 1/14/2019 1/16/2019 1/17/2019   WBC      4 31 - 10 16 Thousand/uL  7 31 7 00   Red Blood Cell Count      3 81 - 5 12 Million/uL  3 59 (L) 3 92   Hemoglobin      11 5 - 15 4 g/dL  11 9 12 9   HCT      34 8 - 46 1 %  38 3 41 6   Platelet Count      385 - 390 Thousands/uL  211 126 (L)   Sodium      136 - 145 mmol/L  141 139   Potassium      3 5 - 5 3 mmol/L  4 0 4 2   Chloride      100 - 108 mmol/L  107 104   CO2      21 - 32 mmol/L  20 (L) 22   Anion Gap      4 - 13 mmol/L  14 (H) 13   BUN      5 - 25 mg/dL  4 (L) 3 (L)   Creatinine      0 60 - 1 30 mg/dL  0 40 (L) 0 46 (L)   Glucose, Random      65 - 140 mg/dL  129 149 (H)   Calcium      8 3 - 10 1 mg/dL  8 4 9 1   AST      5 - 45 U/L  152 (H) 118 (H)   ALT      12 - 78 U/L  94 (H) 88 (H)   Alkaline Phosphatase      46 - 116 U/L  136 (H) 158 (H)   Total Protein      6 4 - 8 2 g/dL  6 4 7 2   Albumin      3 5 - 5 0 g/dL  2 5 (L) 2 8 (L)   TOTAL BILIRUBIN      0 20 - 1 00 mg/dL  0 40 0 30   eGFR      ml/min/1 73sq m  133 127   Ammonia      11 - 35 umol/L 58 (H) 47 (H)    PHENOBARBITAL LEVEL      15 0 - 40 0 ug/mL 8 2 (L)       General exam   Blood pressure 99/59, pulse 79, height 5' (1 524 m)    Appearance: excessively sedated, nonverbal  Carotids: n/a  Cardiovascular: regular rate and rhythm  Pulmonary: unable to hear  Extremities: no edema    HEENT: anicteric   Fundoscopy: not assessed    Mental status  Orientation: nonverbal  Fund of Knowledge: nonverbal   Attention and Concentration: nonverbal  Current and Remote Memory:nonverbal  Language: nonverbal    Cranial Nerves  CN 1: not tested  CN 2: pupils equal round reactive to direct and consenual light   CN 3, 4, 6: dysconjugate eyes, mild nystagmus with movement  CN 5:not assessed  CN 7:muscles of facial expression are symmetric and corneal reflex is intact symmetrically  CN 8:not assessed  CN 9, 10:not assessed  CN 11:not assessed  CN 12:not assessed    Motor:  Bulk, Tone: thin muscle build, relatively hypotonic tone  Pronation: not assessed  Strength: patient did not participate in strength testing    Sensory:  Lighttouch: not assessed due to cognitive impairment    Coordination: Unable to test    Reflexes: not assessed    Past Medical/Surgical History:  Patient Active Problem List   Diagnosis    Lennox-Gastaut syndrome with tonic seizures (HCC)    Lactic acidosis    Underweight    Intellectual disability    Hyperammonemia (HCC)    Hypokalemia    Hypernatremia    Osteoporosis    Onychomycosis    Lethargy    Hyperkeratosis    Intractable epilepsy without status epilepticus (HCC)    Somnolence    Transaminitis    Hypotension    Incontinence    Skin breakdown    MRSA colonization    Hypoalbuminemia    Right atrial enlargement    Sedated due to multiple medications    Metabolic acidosis, increased anion gap (IAG)    Breast mass     Past Medical History:   Diagnosis Date    ADHD     Anoxic brain damage (HCC)     Autistic disorder     Hyperammonemia (HCC)     Hyperkeratosis     Hypotension     Intellectual disability     Intellectual disability     Lennox-Gastaut syndrome with tonic seizures (HCC)     Lethargy     Liver enzyme elevation     Onychomycosis     Osteoporosis     Osteoporosis     Psychiatric disorder     anxiety     Past Surgical History:   Procedure Laterality Date    ABDOMINAL SURGERY      CARDIAC PACEMAKER PLACEMENT      IR THORACENTESIS  12/17/2018    JEJUNOSTOMY FEEDING TUBE      PEG TUBE PLACEMENT       Past Psychiatric History:  Behavioral agitation    Medications:    Current Outpatient Prescriptions:     acetaminophen (TYLENOL) 325 mg tablet, Take 650 mg by mouth every 6 (six) hours as needed for mild pain, Disp: , Rfl:     chlorhexidine (HIBICLENS) 4 % external liquid, Apply 1 application topically daily for 14 days, Disp: 120 mL, Rfl: 0    enoxaparin (LOVENOX) 40 mg/0 4 mL, Inject 0 4 mL (40 mg total) under the skin every 24 hours, Disp: , Rfl: 0    lactulose 20 g/30 mL, 30 mL (20 g total) by Per G Tube route 3 (three) times a day, Disp: , Rfl: 0    levETIRAcetam (KEPPRA) 100 mg/mL oral solution, 10 mL (1,000 mg total) by Per G Tube route every 12 (twelve) hours, Disp: , Rfl: 0    levOCARNitine (CARNITOR) 1 g/10 mL solution, 7 5 mL (750 mg total) by Per G Tube route 3 (three) times a day, Disp: 474 mL, Rfl: 7    magnesium hydroxide (MILK OF MAGNESIA) 400 mg/5 mL oral suspension, Take by mouth daily as needed for constipation, Disp: , Rfl:     MELATONIN PO, 6 mg by Per G Tube route daily at bedtime, Disp: , Rfl:     Multiple Vitamins-Minerals (MULTIVITAMIN WITH IRON-MINERALS) liquid, 5 mL by Per G Tube route daily, Disp: , Rfl:     mupirocin (BACTROBAN) 2 % nasal ointment, into each nostril 2 (two) times a day for 5 days For mrsa colonization, Disp: 10 g, Rfl: 0    ondansetron (ZOFRAN) 4 mg tablet, 4 mg by Per G Tube route every 8 (eight) hours as needed for nausea or vomiting  , Disp: , Rfl:     perampanel (FYCOMPA) oral suspension, 20 mL (10 mg total) by Per OG Tube route daily, Disp: 680 mL, Rfl: 5    PHENobarbital 20 mg/5 mL elixir, Take 5 mL (20 mg total) by mouth daily at bedtime for 14 days Then stop this medication  , Disp: 150 mL, Rfl: 0    Probiotic Product (ALEXANDER-BID PROBIOTIC PO), 1 tablet by Per G Tube route daily  , Disp: , Rfl:     rufinamide (BANZEL) 400 mg tablet, 3 tablets (1,200 mg total) by Per G Tube route 2 (two) times a day, Disp: 180 tablet, Rfl: 5    topiramate (TOPAMAX) 50 MG tablet, 5 tablets (250 mg total) by Per G Tube route every 12 (twelve) hours, Disp: 300 tablet, Rfl: 5    valproic acid (DEPAKENE) 250 MG/5ML soln, 5 mL (250 mg total) by Per G Tube route every 8 (eight) hours, Disp: 450 mL, Rfl: 5    Allergies: Allergies   Allergen Reactions    Felbamate        Family history:  History reviewed  No pertinent family history  There is no family history of seizure, epilepsy or developmental delay        Social History  Living situation:  Resident of [place] [id number]  Social History Substance Use Topics    Smoking status: Never Smoker    Smokeless tobacco: Never Used    Alcohol use No      Review of Systems  A review of at least 12 organ/systems was obtained by the medical assistant and reviewed by me, including additional positives/negatives:   Neurological: Positive for seizures  Negative for dizziness, tremors, syncope, facial asymmetry, speech difficulty, weakness, light-headedness, numbness and headaches  Decision making was of high-complexity due to the patient's high risk condition (seizures), psychiatric and neuropsychological comorbidities, behavioral problems, memory and cognitive problems and medication side effects  The total amount of time spent with the patient was 60 minutes  More than 50% of this time was devoted to counseling and coordination of care  Issues addressed during this clinic visit included overall management, medication counseling or monitoring (including adverse effects, side effects and risks of antiepileptic medications)     Start time: 8:30AM  End time: 9:30AM    Neurology/Epilepsy Procedure Note    VNS Interrogation and reprogramming performed on 1/22/2019    Model: [id number]  S/N: [id number]  Date of implant: 11/3/2015    Current settings:  Output Current 2 mAmp   Signal Frequency 30 Hz   Pulse Width 500 microsec   Signal On Time 21 sec   Signal Off Time 0 8 min     Magnet settings:  Mag Output 2 25 mAmp   Pulse Width 500 microsec   Mag On Time 60 sec     Diagnostics:  Communication OK  Output current 2mAmp  Lead Impedance OK  Impedance value 1684 Ohms  IFI No  Battery indicator 25-50%    Reprogrammed settings:  Output Current 2 mAmp   Signal Frequency 20 Hz   Pulse Width 250 microsec   Signal On Time 14 sec   Signal Off Time 0 5 min     Magnet settings:  Mag Output 2 25 mAmp   Pulse Width 500 microsec   Mag On Time 60 sec     Diagnostics:  Lifetime Magnet activation 7 (last time 9/26/2016)  % stimulation 36% changed to 41%

## 2019-01-22 NOTE — TELEPHONE ENCOUNTER
I want to keep the 2/14 appointment  This appointment is to consider starting Epidiolex which can start without MRI brain study  I gave another copy of the HIPAA form for Epidiolex, did they ever find a signed copy by the parents?

## 2019-01-22 NOTE — TELEPHONE ENCOUNTER
Spoke with Giselle Alexandra and made her aware  They only have the blank HIPAA form  They will have family sign it when the come in next and fax it to the office

## 2019-01-22 NOTE — PATIENT INSTRUCTIONS
Plan:   1 - continue Valproic acid 250mg/5mL give 5mL every 8 hours   2 - continue Banzel 400mg give 3 tabs twice a day   3 - continue Topiramate 250mg twice a day    4 - continue Levetiracetam 100mg/mL give 10mL twice a day   5 - Continue Fycompa 0 5mg/mL oral solution give 20mL (10mg) at bedtime   6 - continue phenobarbital elixir 20mg/5mL to 5mL (20mg) at bedtime until 1/31/2019 then stop  7  - continue with levocarnitine 1gm/10mL give 7 5mL three times a day   8 - MRI brain w/wo contrast (schedule in [facility] so that the VNS can be turned on and off for the study) patient will need clearance for general anesthesia  9 - check CMP, topiramate, valproic acid free level, ammonia level 1 week after phenobarbital is discontinued  10 - plan on starting Epidiolex (Cannabadiol) starting with 100mg/mL give 1mL twice a day for 1 week then 2mL twice a day  Parents will need to sign HIPAA Patient Authorization Form sign by a parent (fax signed form to [phone])  11 - scheduled follow-up on 2/14/2019 at [place] [id number] in [facility] Neurology office to discuss medication adjustments    12 - call the office if there are more clusters of seizures  If so and Epidiolex is not yet available will change Levetiracetam to Briviact 100mg oral solution twice a day

## 2019-01-23 NOTE — TELEPHONE ENCOUNTER
I have not ordered epidiolex yet, need to get her off of phenobarbital and normal LFTs first before starting Epidiolex  The consent/authorization form is necessary to have before prescription is sent

## 2019-02-01 ENCOUNTER — HOSPITAL ENCOUNTER (EMERGENCY)
Facility: HOSPITAL | Age: 39
Discharge: HOME | End: 2019-02-01
Admitting: EMERGENCY MEDICINE
Payer: MEDICARE

## 2019-02-01 VITALS
HEART RATE: 65 BPM | RESPIRATION RATE: 18 BRPM | TEMPERATURE: 98.7 F | HEIGHT: 60 IN | OXYGEN SATURATION: 97 % | WEIGHT: 107.14 LBS | SYSTOLIC BLOOD PRESSURE: 109 MMHG | DIASTOLIC BLOOD PRESSURE: 69 MMHG | BODY MASS INDEX: 21.04 KG/M2

## 2019-02-01 DIAGNOSIS — Z93.1 COMPLAINT ASSOCIATED WITH GASTRIC TUBE (HCC): Primary | ICD-10-CM

## 2019-02-01 DIAGNOSIS — R68.89 COMPLAINT ASSOCIATED WITH GASTRIC TUBE (HCC): Primary | ICD-10-CM

## 2019-02-01 PROCEDURE — 99283 EMERGENCY DEPT VISIT LOW MDM: CPT

## 2019-02-01 NOTE — ED PROVIDER NOTES
History  Chief Complaint   Patient presents with    Feeding Tube Change     PEG tube replacement      Patient is a 16-year-old patient  She is a nursing home patient  She has anoxic brain injury  She has a chronic G-tube that frequently becomes dislodged  It was pulled out sometime last night  She presents for re-insertion  No other complaints  Prior to Admission Medications   Prescriptions Last Dose Informant Patient Reported? Taking? MELATONIN PO   Yes No   Si mg by Per G Tube route daily at bedtime   Multiple Vitamins-Minerals (MULTIVITAMIN WITH IRON-MINERALS) liquid   Yes No   Si mL by Per G Tube route daily   PHENobarbital 20 mg/5 mL elixir   No No   Sig: Take 5 mL (20 mg total) by mouth daily at bedtime for 14 days Then stop this medication     Probiotic Product (ALEXANDER-BID PROBIOTIC PO)   Yes No   Si tablet by Per G Tube route daily     acetaminophen (TYLENOL) 325 mg tablet  Other (Specify) Yes No   Sig: Take 650 mg by mouth every 6 (six) hours as needed for mild pain   chlorhexidine (HIBICLENS) 4 % external liquid   No No   Sig: Apply 1 application topically daily for 14 days   enoxaparin (LOVENOX) 40 mg/0 4 mL   No No   Sig: Inject 0 4 mL (40 mg total) under the skin every 24 hours   lactulose 20 g/30 mL   No No   Si mL (20 g total) by Per G Tube route 3 (three) times a day   levETIRAcetam (KEPPRA) 100 mg/mL oral solution   No No   Sig: 10 mL (1,000 mg total) by Per G Tube route every 12 (twelve) hours   levOCARNitine (CARNITOR) 1 g/10 mL solution   No No   Si 5 mL (750 mg total) by Per G Tube route 3 (three) times a day   magnesium hydroxide (MILK OF MAGNESIA) 400 mg/5 mL oral suspension  Outside Facility (Specify) Yes No   Sig: Take by mouth daily as needed for constipation   ondansetron (ZOFRAN) 4 mg tablet   Yes No   Si mg by Per G Tube route every 8 (eight) hours as needed for nausea or vomiting     perampanel (FYCOMPA) oral suspension   No No   Si mL (10 mg total) by Per OG Tube route daily   rufinamide (BANZEL) 400 mg tablet   No No   Sig: 3 tablets (1,200 mg total) by Per G Tube route 2 (two) times a day   topiramate (TOPAMAX) 50 MG tablet   No No   Si tablets (250 mg total) by Per G Tube route every 12 (twelve) hours   valproic acid (DEPAKENE) 250 MG/5ML soln   No No   Si mL (250 mg total) by Per G Tube route every 8 (eight) hours      Facility-Administered Medications: None       Past Medical History:   Diagnosis Date    ADHD     Anoxic brain damage (HCC)     Autistic disorder     Hyperammonemia (HCC)     Hyperkeratosis     Hypotension     Intellectual disability     Intellectual disability     Lennox-Gastaut syndrome with tonic seizures (HCC)     Lethargy     Liver enzyme elevation     Onychomycosis     Osteoporosis     Osteoporosis     Psychiatric disorder     anxiety       Past Surgical History:   Procedure Laterality Date    ABDOMINAL SURGERY      CARDIAC PACEMAKER PLACEMENT      IR THORACENTESIS  2018    JEJUNOSTOMY FEEDING TUBE      PEG TUBE PLACEMENT         History reviewed  No pertinent family history  I have reviewed and agree with the history as documented  Social History   Substance Use Topics    Smoking status: Never Smoker    Smokeless tobacco: Never Used    Alcohol use No        Review of Systems   Constitutional: Negative for chills and fever  Gastrointestinal: Negative for abdominal pain and vomiting  All other systems reviewed and are negative  Physical Exam  Physical Exam   Constitutional: No distress  HENT:   Head: Atraumatic  Eyes: Conjunctivae are normal  Right eye exhibits no discharge  Left eye exhibits no discharge  Neck: Neck supple  No tracheal deviation present  Pulmonary/Chest: Effort normal  No respiratory distress  Abdominal: Soft  She exhibits no distension  There is no tenderness  Musculoskeletal:   Contracted extremities     Neurological:   Patient is at baseline mental status  Nonverbal   Does not follow commands  Skin: Skin is warm and dry  She is not diaphoretic  Vitals reviewed  Vital Signs  ED Triage Vitals [02/01/19 0813]   Temperature Pulse Respirations Blood Pressure SpO2   98 7 °F (37 1 °C) 65 18 109/69 97 %      Temp Source Heart Rate Source Patient Position - Orthostatic VS BP Location FiO2 (%)   Temporal Monitor Lying Left arm --      Pain Score       No Pain           Vitals:    02/01/19 0813   BP: 109/69   Pulse: 65   Patient Position - Orthostatic VS: Lying       Visual Acuity      ED Medications  Medications - No data to display    Diagnostic Studies  Results Reviewed     None                 No orders to display              Procedures  Feeding Tube  Date/Time: 2/1/2019 8:24 AM  Performed by: Stephanie Esposito by: Kalpesh Stahl     Patient location:  ED  Other Assisting Provider: Yes (comment)    Pre-procedure details: Old tube size:  16 Fr  Indications:     Indications: tube dislodged    Procedure details:     Procedure type:  Replacement    Tube type:  Gastrostomy    Tube size:  16 Fr    Bulb inflation fluid:  Sterile water  Post-procedure details:     Placement/position confirmation:  Auscultation    Placement difficulty:  None    Bleeding:  None    Patient tolerance of procedure:   Tolerated well, no immediate complications           Phone Contacts  ED Phone Contact    ED Course                               MDM    Disposition  Final diagnoses:   Complaint associated with gastric tube [facility])     Time reflects when diagnosis was documented in both MDM as applicable and the Disposition within this note     Time User Action Codes Description Comment    2/1/2019  8:21 AM Clista Sagamore Add [E42 09,  Z93 1] Complaint associated with gastric tube ([address] )     2/1/2019  8:21 AM Bravo Decree [A95 16,  Z93 1] Complaint associated with gastric tube ([address] )     2/1/2019  8:21 AM Clista Seng Add [R68 89,  Z93 1] Complaint associated with gastric tube [facility])       ED Disposition     ED Disposition Condition Date/Time Comment    Discharge  Fri Feb 1, 2019  8:21 AM [facility] discharge to home/self care  Condition at discharge: Good        Follow-up Information     Follow up With Specialties Details Why [address] [facility], DO Family Medicine  As needed [address] [facility] [id number]            Patient's Medications   Discharge Prescriptions    No medications on file     No discharge procedures on file      ED Provider  Electronically Signed by           Reilly Ely MD  02/01/19 1553

## 2019-02-01 NOTE — DISCHARGE INSTRUCTIONS
How to Use and Care for Your PEG Tube   WHAT YOU NEED TO KNOW:   A percutaneous endoscopic gastrostomy (PEG) tube is a plastic tube that is put into your stomach through your skin  PEG tubes are most often used to give you food or liquids if you are not able to eat or drink  Medical formula, medicines, and water can be given through a PEG tube  DISCHARGE INSTRUCTIONS:   Return to the emergency department if:   · You start coughing or vomiting during or after a feeding  · You have severe abdominal pain  · Blood or tube feeding fluid leaks from the PEG tube site  · Your PEG tube is shorter than it was when it was put in     · Your PEG tube comes out  · Your mouth feels dry, your heart feels like it is beating too fast, or you feel weak  Contact your healthcare provider if:   · You have nausea, diarrhea, or abdominal bloating or discomfort  · You have stomach pain after each feeding or when you move around  · You have discomfort or pain around your PEG tube site  · The skin around your PEG tube is red, swollen, or draining pus  · You weigh less than your healthcare provider says you should  · Your PEG tube is longer than it was when it was put in     · You have questions or concerns about your condition or care  How to use your PEG tube: Your healthcare provider will tell you when and how often to use your PEG tube for feedings  You may need a bolus, intermittent, or continuous feeding  A bolus feeding is when formula is give over a short period of time  An intermittent feeding is scheduled for certain times throughout the day  Continuous feedings run all the time  Ask your healthcare provider which method is best for you:  · Use a feeding syringe  Connect the feeding syringe to the end of the PEG tube  Pour the correct amount of formula into the syringe  Hold the syringe up high  Formula will flow into the PEG tube   The syringe plunger may be used to gently push the last of the formula through the PEG tube  · Use a gravity drip bag  Connect the tubing from the gravity drip bag to the end of the PEG tube  Pour the formula into a gravity drip bag  Hang the bag on a medical pole, a , or other device  Adjust the flow rate on the tubing according to your healthcare provider's instructions  Formula will flow into the PEG tube  Ask how long it should take to complete your feeding  · Use a feeding pump  You may use an electric pump to control the flow of the formula into your PEG tube  Your healthcare provider will teach you how to set up and use the pump  How to care for your PEG tube:   · Always flush your PEG tube before and after each use  This helps prevent blockage from formula or medicine  Use at least 2 tablespoons (30 milliliters) of water to flush the tube  Follow directions for flushing your PEG tube  · If your PEG tube becomes clogged, try to unclog it as soon as you can  Flush your PEG tube with a 60 milliliter (mL) syringe filled with warm water  Never use a wire to unclog the tube  A wire can poke a hole in the tube  Your healthcare provider may have you use a special medicine or a plastic brush to help unclog your tube  · Check the PEG tube daily  ¨ Check the length of the tube from the end to where it goes into your body  If it gets longer, it may be at risk for coming out  If it gets shorter, let your healthcare provider know right away  ¨ Check the bumper  (piece that goes around the tube, next to your skin)  It should be snug against your skin  Tell your healthcare provider if the bumper seems too tight or too loose  · Use an alcohol pad to clean the end of your PEG tube  Do this before you connect tubing or a syringe to your PEG tube and after you remove it  When you disconnect tubing or a syringe from your PEG tube, do not let the end of the PEG tube touch anything  How do I care for the skin around my PEG tube?    · Do not remove the stitches or medical tape that hold your PEG tube in place  when you first get it  Your healthcare provider will take them off once the skin around your tube heals  Leave clean bandages over the tube area for the first 24 hours after the tube is put in  You may not need to use bandages after 24 hours if the skin around the tube looks dry  Ask when you can shower or bathe  · Routine skin care:      ¨ Clean the skin around your tube 1 to 2 times each day  Ask your healthcare provider what you should use to clean your skin  Check for redness and swelling in the area where the tube goes into your body  Check for fluid draining from your stoma (the hole where the tube was put in)  ¨ Gently turn your tube daily  after your stitches come out  This may decrease pressure on your skin under the bumper  It may also help prevent an infection  ¨ Keep the skin around your PEG tube dry  This will help prevent skin irritation and infection  · Use topical medicines as directed  You may need to put antibiotic cream on the skin around your tube after you are done cleaning it  Other tips to know about a PEG tube:   · You may need to keep track of how much formula and other liquids you have each day  You may also need to keep track of how much you urinate and how many times you have a bowel movement each day  Bring this record to your follow-up visits  · You may need to check your weight daily or weekly  Keep a record of your weights and bring it to your follow-up visits  Your healthcare provider may need to change your feedings if your weight changes too quickly  · Take your medicines as directed  Learn which of your medicines can be crushed, mixed with water, and given through the PEG tube  Certain medicines should not be crushed or may clog the PEG tube  · Go to all follow-up appointments  You may need to have blood tests and other tests when you see your healthcare provider    © 2017 [facility] [address] [facility] is for End User's use only and may not be sold, redistributed or otherwise used for commercial purposes  All illustrations and images included in CareNotes® are the copyrighted property of A D A M [employer]  or Jason Chiu  The above information is an  only  It is not intended as medical advice for individual conditions or treatments  Talk to your doctor, nurse or pharmacist before following any medical regimen to see if it is safe and effective for you

## 2019-02-05 LAB
MYCOBACTERIUM SPEC CULT: NORMAL
RHODAMINE-AURAMINE STN SPEC: NORMAL

## 2019-02-08 ENCOUNTER — TELEPHONE (OUTPATIENT)
Dept: NEUROLOGY | Facility: CLINIC | Age: 39
End: 2019-02-08

## 2019-02-08 NOTE — TELEPHONE ENCOUNTER
Called facility regarding patient's missing blood work results  Nurse Enma Phan advised that the labs were drawn but they have not receive the results yet  They will bring the results to her next appointment with Dr Bebeto Griffin

## 2019-02-14 ENCOUNTER — OFFICE VISIT (OUTPATIENT)
Dept: NEUROLOGY | Facility: CLINIC | Age: 39
End: 2019-02-14
Payer: MEDICARE

## 2019-02-14 ENCOUNTER — TELEPHONE (OUTPATIENT)
Dept: NEUROLOGY | Facility: CLINIC | Age: 39
End: 2019-02-14

## 2019-02-14 VITALS — HEART RATE: 101 BPM | DIASTOLIC BLOOD PRESSURE: 78 MMHG | SYSTOLIC BLOOD PRESSURE: 102 MMHG

## 2019-02-14 DIAGNOSIS — R40.0 SOMNOLENCE: ICD-10-CM

## 2019-02-14 DIAGNOSIS — G40.419 OTHER GENERALIZED EPILEPSY, INTRACTABLE, WITHOUT STATUS EPILEPTICUS (HCC): ICD-10-CM

## 2019-02-14 DIAGNOSIS — R41.89 SEDATED DUE TO MULTIPLE MEDICATIONS: ICD-10-CM

## 2019-02-14 DIAGNOSIS — E72.20 HYPERAMMONEMIA (HCC): ICD-10-CM

## 2019-02-14 DIAGNOSIS — G40.812 LENNOX-GASTAUT SYNDROME WITH TONIC SEIZURES (HCC): Primary | ICD-10-CM

## 2019-02-14 DIAGNOSIS — R74.01 TRANSAMINITIS: ICD-10-CM

## 2019-02-14 PROBLEM — I95.9 HYPOTENSION: Status: RESOLVED | Noted: 2018-08-20 | Resolved: 2019-02-14

## 2019-02-14 PROBLEM — E87.6 HYPOKALEMIA: Status: RESOLVED | Noted: 2017-11-24 | Resolved: 2019-02-14

## 2019-02-14 PROBLEM — E87.20 LACTIC ACIDOSIS: Status: RESOLVED | Noted: 2017-07-06 | Resolved: 2019-02-14

## 2019-02-14 PROBLEM — E87.2 LACTIC ACIDOSIS: Status: RESOLVED | Noted: 2017-07-06 | Resolved: 2019-02-14

## 2019-02-14 PROBLEM — E87.0 HYPERNATREMIA: Status: RESOLVED | Noted: 2017-12-06 | Resolved: 2019-02-14

## 2019-02-14 PROCEDURE — 99215 OFFICE O/P EST HI 40 MIN: CPT | Performed by: PSYCHIATRY & NEUROLOGY

## 2019-02-14 PROCEDURE — 99354 PR PROLONGED SVC OUTPATIENT SETTING 1ST HOUR: CPT | Performed by: PSYCHIATRY & NEUROLOGY

## 2019-02-14 PROCEDURE — 95976 ALYS SMPL CN NPGT PRGRMG: CPT | Performed by: PSYCHIATRY & NEUROLOGY

## 2019-02-14 RX ORDER — RUFINAMIDE 400 MG/1
1600 TABLET, FILM COATED ORAL 2 TIMES DAILY
Qty: 240 TABLET | Refills: 5 | Status: SHIPPED | OUTPATIENT
Start: 2019-02-14 | End: 2019-08-22 | Stop reason: SDUPTHER

## 2019-02-14 RX ORDER — VALPROIC ACID 250 MG/5ML
250 SOLUTION ORAL EVERY 12 HOURS
Qty: 300 ML | Refills: 3 | Status: SHIPPED | OUTPATIENT
Start: 2019-02-14 | End: 2019-03-18 | Stop reason: SDUPTHER

## 2019-02-14 NOTE — PROGRESS NOTES
Review of Systems    {LimROS-complete:63450}      Review of Systems   Constitutional: Positive for fatigue  Negative for appetite change and fever  HENT: Negative  Negative for hearing loss, tinnitus, trouble swallowing and voice change  Eyes: Negative  Negative for photophobia and pain  Respiratory: Negative  Negative for shortness of breath  Cardiovascular: Negative  Negative for palpitations  Gastrointestinal: Negative  Negative for nausea and vomiting  Endocrine: Negative  Negative for cold intolerance and heat intolerance  Genitourinary: Negative  Negative for dysuria, frequency and urgency  Musculoskeletal: Negative  Negative for myalgias and neck pain  Skin: Negative  Negative for rash  Neurological: Positive for seizures  Negative for dizziness, tremors, syncope, facial asymmetry, speech difficulty, weakness, light-headedness, numbness and headaches  Hematological: Negative  Does not bruise/bleed easily  Psychiatric/Behavioral: Negative  Negative for confusion, hallucinations and sleep disturbance

## 2019-02-14 NOTE — PROGRESS NOTES
Patient's Name: Michael Cabello   Patient's : 1980   Visit Type: follow-up  Referring MD / PCP:  Wilma Aparicio,      Assessment:  Ms Peggy Ortega is a 41 y o  woman with Marijean Saris Syndrome, remote history of anoxic brain injury, h/o status epilepticus, severe intellectual disability, and frequent medication adverse effects  She has a VNS due to intractable epilepsy  Ms Jorge Li epilepsy has been extremely difficult to control despite the use of 6 antiepileptic medications, complicated by significant side effects of lethargy, hepatic transaminitis, and hyperammoniemia  She continues to have persistently elevated hepatic transaminitis and cognitive decline  Her persistent somnolence can still be due to one of her antiepileptic medication or due to the underlying epileptic encephalopathy (especially since her EEG shows extremely frequent tonic seizures)  Other than phenobarbital, Fycompa was increased which can be contributing to persistent lethargy and valproic acid can be contributing to hepatic injury  We will have to decrease both of these medications  I will recommend that we start Epidioloex now to compensate for the dose adjustment of valproic acid and Fycompa  I will plan on weaning off of valproic acid once Epidiolex is started as the combination of these medications increases the risk of hepatotoxicity  It seems that she requires topiramate for seizure prevention  It is unclear if Banzel, Levetiracetam, and Fycompa have been helpful  She is a candidate for Epidiolex and we had previously reviewed the side effects of Epidiolex with her father and nurse (excessive sedation, transaminitis, GI side effects)  Given the intractability of her seizures, I have maximized the duty cycle of her VNS by shortening the off time, this will increase the likelihood of battery depletion    Also we need to investigate the possibility of surgical intervention as palliative treatment (such as corpus callosotomy or finding a cortical lesion)  To do this, I will require an MRI brain study  Due to her inability to stay still for the study, she will need to have the MRI brain study done under general anesthesia and at a facility that we can turn her VNS on and off (preferably at the [facility])  It is postulated that the excessive ammonia level is due to abnormal mitochondrial activity with valproate exposure  Due to improvement with seizure control with valproate, supplementation with L-carnitine is necessary to counteract hyperammoniemia  She should also continue with lactulose  Plan:   1 - continue with Levetiracetam 100mg/mL give 10mL twice a day  2 - continue with Topiramate 250mg twice a day  3 - decrease Fycompa 0 5mg/mL to 16mL (8mg) at bedtime  4 - decrease valproic acid 250mg/5mL to 5mL every 12 hours  5 - increase Banzel 400mg give 4 tabs twice a day  6 - continue with Levocarnitine 1gm/10mL give 7 5mL three times a day  7 - will start Epidiolex 100mg/mL give 1mL twice a day for 14 days then increase to 2mL twice a day (will forward paper prescription to our nurse navigator to get it to specialty pharmacy)  8 - recheck LFT and ammonia level in 1 month  9 - continue with MRI brain w/wo contrast study on   10 - monitor for jaundice and progressive lethargry  11 - follow-up in 1 month with AP and 2 months with Dr Eladio Santos   Parents had signed the HIPAA Patient Authorization Form requested that the facility fax signed form to [phone] - call the office if there are more clusters of seizures  If so and Epidiolex is not yet available will change Levetiracetam to Briviact 100mg oral solution twice a day      Problem List Items Addressed This Visit        Nervous and Auditory    Lennox-Gastaut syndrome with tonic seizures (HCC) - Primary    Relevant Medications    rufinamide (BANZEL) 400 mg tablet    perampanel (FYCOMPA) oral suspension    valproic acid (DEPAKENE) 250 MG/5ML soln    Cannabidiol (EPIDIOLEX) 100 MG/ML SOLN    Other Relevant Orders    Hepatic function panel    Ammonia    Intractable epilepsy without status epilepticus (HCC)    Relevant Medications    rufinamide (BANZEL) 400 mg tablet    perampanel (FYCOMPA) oral suspension    valproic acid (DEPAKENE) 250 MG/5ML soln    Cannabidiol (EPIDIOLEX) 100 MG/ML SOLN       Other    Hyperammonemia (HCC)    Relevant Orders    Ammonia    Somnolence    Transaminitis    Relevant Orders    Hepatic function panel    Sedated due to multiple medications        Chief Complaint:    Chief Complaint     Seizures        HPI:    Uchejohn Garcia is a 41 y o  unknown handed female here for follow-up evaluation of intractable epilepsy in the setting of LGS  Interval History 2/14/2019  She is here with a nursing aid who has known her for a long time but has not worked directly with her  She is still very lethargic, not eating any thing by mouth  She continues to have a lot of seizures, eyes rolling back, body tension  I spoke with Amberly Covington, he did fill out the HIPAA form for Epidiolex  I spoke with nursing supervisor, she may get roudy for the last couple of days  She is not ambulatory or eat  Current weight 98lbs  AED/side effects/compliance:  Valproic acid 250mg/5mL give 5mL three times a day   Banzel 400mg give 3 tabs twice a day   Topiramate 250mg tab twice a day   Levetiracetam 100mg/mL give 10mL twice a day   Fycompa 0 5mg/5mL give 20mL (10mg) at bedtime (oral solution)    levocarnitine 1gm/10mL give 7 5mL three times a day and lactulose for VPA induced hyperammoniemia    Event/Seizure semiology:  Seizure semiology:  1  She was described to have drop attacks or spells with grunting sounds and falling to the floor  2  Her nurse commented on episodes of spasms or myoclonic jerking of the right arm  3  Generalized convulsions  4   Tonic seizures of eyes rolling back (mostly during sleep)    Prior Epilepsy History:  Collective epilepsy history 11/6/2014 was previously evaluated by Dr Radha Saenz including a hospitalization in February 2013 for status epilepticus, in the setting of missed doses of AEDs due to refusal to eat  She subsequently required anesthetic induced coma to stop her seizures and PEG tube was placed  She was seen almost every month for management of her seizures up until November 2013  She has medically refractory seizures and had a VNS placed possibly in the early 2000s and a generator changed in 2011  Unknown AEDs that were tried but felbamate is listed as an allergy  In 2011, her AEDs were clonazepam, levetiracetam, Depakote and Lamictal  Dr Radha Saenz had started [facility] in 2011  In 2012, Gayl Angelucci was added with the reduction of clonazepam  There were difficulty with documenting seizure frequency; but seizures were no longer daily occurrences but there were clusters of seizures  There would be good periods and bad periods of seizure frequency  Dr Radha Saenz had been increasing the duty cycle of the VNS over the course of 2012 to 2013  Sometime between August 2013 and October 2013, topiramate was introduced  She was in status epilepticus in 2013, she was on Keppra, Banzel, Onfi, and Lamictal  She had an EEG at some point that showed multifocal spike-wave and background attenuation  She has intermittent agitation and day time somnolence  When she was hospitalized for status epilepticus, she was started on methylphenidate to help wake her up  Intake history November 2014:  VPA level 127  Her Valproic Acid dose was previously 250mg q6hrs based on Dr Phil Valenzuela notes  Since July 2014, she has been receiving VPA 500mg q6hrs  She has not been hospitalized since September 2013  She was previously ambulatory with therapy  She spends most of her time sleeping for the past couple of months  She has also been receiving lactulose for elevated ammonia levels     (older drugs VPA, LVT, LMG, newer drugs added on since 2011 RFN, Onfi, TPM)     Summary of 2015: We decreased VPA from TDD 2000mg to 1000mg with increased agitation/combativeness, alternating days of exhaustion (no behavioral specialist has been involved)  are randomly reported by multiple staff members  Seizures are typically reported by CNA's or her one to one on the 3PM to 11PM shift  Seizures are described a brief events of eyes rolling back and arms going up in the air, followed by hair pulling or slapping herself  These seizures were reported as either sporadic or every other day episodes  There have been no documented grand mal seizures or drop attacks  She refuses to wear safety helmet, rips at her hair, and attempts to flip herself out of her chair and bed  Chris Mora can walk briefly but walks on her toes, she frequently will allow herself to fall down when she does not want to walk  It does not appear that methylphenidate has been useful in maintaining her level of wakefulness during the day  VNS generator change on 11/3/2015  The Model 103 (serial [id number]) was replaced with [employer] Model 105, serial [id number]  Weaned off of levetiracetam due to multiple medications and agitation; by the end of 2016, she was down to -500  Summary of 2016:  Started with RFN 9115-1586, -250, CLB 0 5-0 5-25,  QID, -200 attempted to wean off of LEV and reduced the dose of VPA given that there were no reports of seizures and she was sedated, along with elevated ammonia levels  It was unclear as to how effective LEV was for her seizure control  When she missed a dose of TPM in September 2016, she had multiple seizures  We attempted to compensate for increase in seizures by increasing Onfi to 20-20; however, it seems that it made her more sedated  Summary of 2017  RFN [id number], -250, Onfi 20-20, -200,  q8hrs  She has been more somnolent    She continues to have tonic seizures when she is asleep, eyes rolling body, arms will tense up with some twitching, last a few seconds, but will recur  There are no seizures during the daytime  We attempted to wean off of VPA due to ammonia / somnolence; but there was an increase in tonic seizures (tonic stiffness, eyes rolling upwards, and subtle arm (right?) jerking)  She was admitted to hospital 10/10 to 10/26/2017, due to seizures, was put on continuous video EEG monitoring to determine her seizure type, seizure frequency, and rapid medication adjustments  The increased in seizure frequency was likely due to an underlying infection, but also she likely had frequent seizures during sleep which did not cause much injury to the patient  A number of medication adjustments were attempted including discontinuation of lamotrigine due to concern about it worsening seizures, reinstitution of levetiracetam, attempted discontinuation of valproic acid, attempted reduction in Onfi, and starting Fycompa  The patient's seizures were mostly based during sleep, tonic seizures  When valproic acid was discontinued there was an increased number of seizures  Valproic acid was subsequently reinstituted  Summary of 2018  Started with RFN 6686-5016, PER 8, LEV 9967-0027, CLB 5-15, -250,  TID  Due to excessive somnolence Onfi was weaned off  Phenobarbital   She was on continuous EEG monitoring when she was in hospital for PNA that showed very frequent tonic seizures during sleep  VPA is causing hyperammoniemia  She has had more admissions for somnolence / lethargy, VPA was reduced for transaminitis  She was tested for inborn error of metabolism with plasma amino acid, urine organic acid, and acylcarnitine levels  There were nonspecific elevations in some amino acid or organic acid but no pattern consistent with a specific inborn error of metabolism  Elevated carnitine level was consistent with supplementation    Higher doses of phenobarbital may also contribute to sedation, phenobarbital was reduced to 20mg but on follow-up she was on 20mL of oral solution (80mg / day)  There is variable reporting in the frequency of tonic seizures (these are the eyes are rolling back and shaking, then stop, then happen again in 10-15 minutes)  Her level of alertness is also variable with erratic sleep cycle  Interval History 1/10/2019   TID, RFN 1402-3453, -200, LEV 5669-1697, PER 10, PB 40 qHS  In December 2018, Zuri Branch was admitted to hospital for hypoxia and fever found to have PNA with pleural effusion, septic shock and aspiration, she was subsequently intubated  She had an EEG that captured recurrent tonic seizures during sleep but this is consistently found in all of her other EEG studies  Phenobarbital was decreased to reduce sedation  Fycompa was increased to 10mg at bedtime  She has returned to her SNF, parents have noticed increase in seizure frequency (tonic seizures)  She also had an increase in AST/ALT levels  Her albumin was 1 3-2 2 in December 2018  She is here with her CNA who has known her for a long time  Her CNA reports that Zuri Branch is no longer Zuri Branch, she is essentially bed ridden  She does not eat and does not walk  Zuri Branch usually participate in activities, walking, and eating food (if she was at a [facility] she would be able to eat a hamburger without difficulty)  I was able to speak to her father Daniel Ivey and he reported that he too saw a significant decline in mental status  There was a phone call from [place] [id number] reporting an increase in seizure activity (brief episodes tonic-myoclonic jerking); but subsequent reports did not notice an increase in activity  She has not had a convulsive seizure  I spoke with [facility] unit nurse at [place] [id number], but it is his first day there and he is not familiar with Zuri Branch  Interval History 1/22/2019   TID, RUF 1752-3058, -250, LEV 8814-0118, PER 10, PB 20    We had reduced phenobarbital due to excessive lethargy  However, she was admitted to hospital again for breakthrough seizures (convulsive seizure)  TPM was increased to previously prescribed 250mg BID  Ammonia level is persistently elevated  She is here with Vera who does not know the patient well to give any information  I called [facility] and spoke with Prem Reis  On the day she was admitted to hospital, Lawrence Raymond was getting washed and dressed with progressively more frequent seizures flailing in bed, ripping the alarms, seizure recurred every minute to a minute and a half, including convulsive type of seizures, cluster of seizures lasted 30-40 minutes (stiffening)  It continued until she arrived at the ED  There was no fever associated with her seizure cluster  She is more alert with phenobarbital reduction  Her parents have not completed the HIPAA form for Epidiolex, they have not been around  They do not have the form in her chart  The seizure clusters eventually decreased to more baseline activity, witnessed 1-3 seizures during the day  She appears to be more awake, alert, will yell and talking voice, and communicate as best as she can, and participate in some activity  Special Features  Status epilepticus: yes  Self Injury Seizures: No  Precipitating Factors: menstrual cycle? ?     Epilepsy Risk Factors:  Abnormal pregnancy: unknown  Abnormal birth/: unknown  Abnormal Development: unknown developmental history  Febrile seizures, simple: unknown  Febrile seizures, complex: unknown  CNS infection: No  Mental retardation: Yes  Cerebral palsy: Yes  Head injury (moderate/severe): possibly anoxic brain injury  CNS neoplasm: No  CNS malformation: No  Neurosurgical procedure: No  Stroke: No  Alcohol abuse: No  Drug abuse: No  Family history Sz/epilepsy: unknown    Prior AEDs:  Rufinamide  Clobazam (excessive sedation)  Clonazepam  Levetiracetam (unclear if beneficial)  Lamotrigine (unclear if beneficial)  Topiramate (missed doses caused breakthrough seizures)  Valproate (as medication was weaned off, there were increased seizures)  Fycompa  Felbamate (listed as a drug allergy)  Phenobarbital (contributing to sedation)    Prior workup:  x   Imaging:  CT head 2/21/2013  Normal CT of brain    CT head 9/7/2018  No acute intracranial pathology    EEGs:  Continuous video EEG monitoring 10/13 - 10/15/2017  Frequent generalized spike/polyspike-slow wave complexes during sleep  At times there are independent right more than left midtemporal spikes and bitemporal spikes    Innumerable generalized tonic seizures during sleep, generalized 1 Hz period discharges, that would become continuous for 30 seconds or generalized rhythmic alpha-beta discharges, associated with patient becoming rigid, tonic posturing with arms elevated and tense with subtle jerking of the upper body, eyes opening with upward deviation  Dr Chris Alexander reported 4 ictal patterns:  1 - 1-3 seconds of diffuse electrodecrement then 2-7 seconds of fast activity then 2Hz spike wave discharges (no clinical manifestation)  2 - same as #1, clinically accompanied by posturing of the arms, then clonic jerking  3 - diffuse low fast activity without progressing to spike-wave discharges  4 - 2 Hz generalized discharges for 2-3 minutes with no clonical manifestations  By 10/15/2017 - the tonic clonic seizures were less frequent    Continuous video EEG monitoring 10/18 - 10/25/2017  Diffuse background slowing with bursts of arrhythmic generalized spike wave discharges, with shifting lateralization, and independent left and  right temporal spikes  Mild eyelid myoclonia associated with brief bursts of 2-2 5 Hz generalized discharges  Tonic seizures with eelctrodecrement  There were innumerable tonic seizures; however by 10/25/2017 the frequency of these seizures did decrease in frequency      Continuous video EEG monitoring 12/1-12/5/2017  There are innumerable tonic seizures that are generally less than one minute in duration (30-40 seconds), these consists of subtle eye opening and tonic stiffening of arms, if longer then whole body stiffening with clonic jerking movements  These almost always occur exclusively out of sleep  These consist of 2-2 5 Hz generalized spike-slow wave complexes with generalized attenuation of activity (desynchronization) or paroxysmal fast activity  Per 24 hours count of seizures could be      12/19/2018 routine study  Frequent recurrent tonic seizures associated with paroxysmal generalized fast activity then generalized rhythmic discharges associated with eyes upward deviation, and myoclonic/clonic jerking of the upper body, excess beta activity  Labs:  Component Ammonia   Latest Ref Rng & Units 11 - 35 umol/L   12/14/2018 37 (H)   12/19/2018 22   12/20/2018 31     Component      Latest Ref Rng & Units 1/14/2019 1/16/2019 1/17/2019   Sodium      136 - 145 mmol/L  141 139   Potassium      3 5 - 5 3 mmol/L  4 0 4 2   Chloride      100 - 108 mmol/L  107 104   CO2      21 - 32 mmol/L  20 (L) 22   Anion Gap      4 - 13 mmol/L  14 (H) 13   BUN      5 - 25 mg/dL  4 (L) 3 (L)   Creatinine      0 60 - 1 30 mg/dL  0 40 (L) 0 46 (L)   Glucose, Random      65 - 140 mg/dL  129 149 (H)   Calcium      8 3 - 10 1 mg/dL  8 4 9 1   AST      5 - 45 U/L  152 (H) 118 (H)   ALT      12 - 78 U/L  94 (H) 88 (H)   Alkaline Phosphatase      46 - 116 U/L  136 (H) 158 (H)   Total Protein      6 4 - 8 2 g/dL  6 4 7 2   Albumin      3 5 - 5 0 g/dL  2 5 (L) 2 8 (L)   TOTAL BILIRUBIN      0 20 - 1 00 mg/dL  0 40 0 30   eGFR      ml/min/1 73sq m  133 127   Ammonia      11 - 35 umol/L 58 (H) 47 (H)    PHENOBARBITAL LEVEL      15 0 - 40 0 ug/mL 8 2 (L)       2/1/2019  CBC 8 1/13/41/197  LFT5 9/2 7/0 5/90/62/125  /4 2/104/25/20/0 4/126    General exam   Blood pressure 102/78, pulse 101    Appearance: chronically ill, eyes are open for a longer period of time, attending to her toy and her nurse  Carotids: n/a  Cardiovascular: regular rate and rhythm  Pulmonary: clear to auscultation   Abdomen: soft to palpation  Extremities: no edema    HEENT: lips are dry and anicteric   Fundoscopy: not assessed    Mental status  Orientation: nonverbal  Fund of Knowledge: nonverbal   Attention and Concentration: nonverbal  Current and Remote Memory:nonverbal  Language: nonverbal    Cranial Nerves  CN 1: not tested  CN 2: pupils equal round reactive to direct and consenual light   CN 3, 4, 6: dysconjugate eyes, mild nystagmus with movement; left eye is more exotropic  CN 5:not assessed  CN 7:muscles of facial expression are symmetric and corneal reflex is intact symmetrically  CN 8:not assessed  CN 9, 10:not assessed  CN 11:not assessed  CN 12:not assessed    Motor:  Bulk, Tone: thin muscle build, relatively hypotonic tone  Pronation: not assessed  Strength:  There appears to be symmetric movements of the legs to withdrawal, and use of both arms in manipulating a toy; she does not follow commands for confrontational testing    Sensory:  Lighttouch: not assessed due to cognitive impairment    Coordination: Unable to test    Reflexes: biceps and triceps 1+/4, knees and ankles 0/4, left toe extensor, right toe mute    Past Medical/Surgical History:  Patient Active Problem List   Diagnosis    Lennox-Gastaut syndrome with tonic seizures ([facility] [address] [facility]    Underweight    Intellectual disability    [facility]ammonemia ([facility] [address]    Osteoporosis    Onychomycosis    Hyperkeratosis    Intractable epilepsy without status epilepticus ([facility] [address] [facility]    Somnolence    Transaminitis    Incontinence    Skin breakdown    MRSA colonization    Hypoalbuminemia    Right atrial enlargement    Sedated due to multiple medications    Metabolic acidosis, increased anion gap (IAG)    Breast mass     Past Medical History:   Diagnosis Date    ADHD     Anoxic brain damage ([facility] [address] [facility]     Autistic disorder     Hyperammonemia (HCC)     Hyperkeratosis     Hypotension     Intellectual disability     Intellectual disability     Lennox-Gastaut syndrome with tonic seizures (HCC)     Lethargy     Liver enzyme elevation     Onychomycosis     Osteoporosis     Osteoporosis     Psychiatric disorder     anxiety     Past Surgical History:   Procedure Laterality Date    ABDOMINAL SURGERY      CARDIAC PACEMAKER PLACEMENT      IR THORACENTESIS  12/17/2018    JEJUNOSTOMY FEEDING TUBE      PEG TUBE PLACEMENT       Past Psychiatric History:  Behavioral agitation    Medications:    Current Outpatient Medications:     acetaminophen (TYLENOL) 325 mg tablet, Take 650 mg by mouth every 6 (six) hours as needed for mild pain, Disp: , Rfl:     bisacodyl (DULCOLAX) 5 mg EC tablet, Take 5 mg by mouth daily as needed for constipation, Disp: , Rfl:     lactulose 20 g/30 mL, 30 mL (20 g total) by Per G Tube route 3 (three) times a day, Disp: , Rfl: 0    levETIRAcetam (KEPPRA) 100 mg/mL oral solution, 10 mL (1,000 mg total) by Per G Tube route every 12 (twelve) hours, Disp: , Rfl: 0    levOCARNitine (CARNITOR) 1 g/10 mL solution, 7 5 mL (750 mg total) by Per G Tube route 3 (three) times a day, Disp: 474 mL, Rfl: 7    magnesium hydroxide (MILK OF MAGNESIA) 400 mg/5 mL oral suspension, Take by mouth daily as needed for constipation, Disp: , Rfl:     MELATONIN PO, 6 mg by Per G Tube route daily at bedtime, Disp: , Rfl:     Multiple Vitamins-Minerals (MULTIVITAMIN WITH IRON-MINERALS) liquid, 5 mL by Per G Tube route daily, Disp: , Rfl:     ondansetron (ZOFRAN) 4 mg tablet, 4 mg by Per G Tube route every 8 (eight) hours as needed for nausea or vomiting  , Disp: , Rfl:     perampanel (FYCOMPA) oral suspension, 16 mL (8 mg total) by Per OG Tube route daily, Disp: 480 mL, Rfl: 5    Probiotic Product (ALEXANDER-BID PROBIOTIC PO), 1 tablet by Per G Tube route daily  , Disp: , Rfl:     rufinamide (BANZEL) 400 mg tablet, 4 tablets (1,600 mg total) by Per G Tube route 2 (two) times a day, Disp: 240 tablet, Rfl: 5    topiramate (TOPAMAX) 50 MG tablet, 5 tablets (250 mg total) by Per G Tube route every 12 (twelve) hours, Disp: 300 tablet, Rfl: 5    valproic acid (DEPAKENE) 250 MG/5ML soln, 5 mL (250 mg total) by Per G Tube route every 12 (twelve) hours, Disp: 300 mL, Rfl: 3    Cannabidiol (EPIDIOLEX) 100 MG/ML SOLN, 100mg every 12 hours for 14 days, then 200mg every 12 hours, Disp: 120 mL, Rfl: 5    Allergies: Allergies   Allergen Reactions    Felbamate        Family history:  History reviewed  No pertinent family history  There is no family history of seizure, epilepsy or developmental delay  Social History  Living situation:  Resident of [facility]  Social History     Tobacco Use    Smoking status: Never Smoker    Smokeless tobacco: Never Used   Substance Use Topics    Alcohol use: No    Drug use: No      Review of Systems  A review of at least 12 organ/systems was obtained by the medical assistant and reviewed by me, including additional positives/negatives:   Constitutional: Positive for fatigue  Negative for appetite change and fever  Neurological: Positive for seizures  Negative for dizziness, tremors, syncope, facial asymmetry, speech difficulty, weakness, light-headedness, numbness and headaches  Decision making was of high-complexity due to the patient's high risk condition (seizures), psychiatric and neuropsychological comorbidities, behavioral problems, memory and cognitive problems and medication side effects  The total amount of time spent with the patient was 70 minutes  More than 50% of this time was devoted to counseling and coordination of care  Issues addressed during this clinic visit included overall management, medication counseling or monitoring (including adverse effects, side effects and risks of antiepileptic medications)     Start time: 10:15AM  End time: 11:25AM    Neurology/Epilepsy Procedure Note    VNS Interrogation and reprogramming performed on 2/14/2019    Model: M105  S/N: [id number]  Date of implant: 11/3/2015    Current settings:  Output Current 2 mAmp   Signal Frequency 20 Hz   Pulse Width 250 microsec   Signal On Time 14 sec   Signal Off Time 0 5 min     Magnet settings:  Mag Output 2 25 mAmp   Pulse Width 500 microsec   Mag On Time 60 sec     Diagnostics:  Communication OK  Output current 2mAmp  Lead Impedance OK  Impedance value 1807 Ohms  IFI No  Battery indicator 25-50%    Reprogrammed settings:  Output Current 2 mAmp   Signal Frequency 20 Hz   Pulse Width 250 microsec   Signal On Time 7 sec   Signal Off Time 0 2 min     Magnet settings:  Mag Output 2 25 mAmp   Pulse Width 500 microsec   Mag On Time 60 sec     Diagnostics:  Lifetime Magnet activation 7 (last time 9/26/2016)  % stimulation 41% duty cycle

## 2019-02-14 NOTE — PATIENT INSTRUCTIONS
PLAN:  1 - continue with Levetiracetam 100mg/mL give 10mL twice a day  2 - continue with Topiramate 250mg twice a day  3 - decrease Fycompa 0 5mg/mL to 16mL (8mg) at bedtime  4 - decrease valproic acid 250mg/5mL to 5mL every 12 hours  5 - increase Banzel 400mg give 4 tabs twice a day  6 - continue with Levocarnitine 1gm/10mL give 7 5mL three times a day  7 - will start Epidiolex 100mg/mL give 1mL twice a day for 14 days then increase to 2mL twice a day  8 - recheck LFT and ammonia level in 1 month  9 - continue with MRI brain w/wo contrast study on   10 - monitor for jaundice and progressive lethargry  11 - please fax to use the Epidiolex HIPAA Patient Authorization form (fax to [phone])  12 - follow-up in 1 month with HELLEN and 2 months with Dr Audra Torres

## 2019-02-14 NOTE — TELEPHONE ENCOUNTER
Pt's mom Tyringham Robson called asking if we receive the HIPPAA form for Epidiolex that was faxed on 1/24/19  Any Turner of the below and verbalized understanding

## 2019-02-14 NOTE — TELEPHONE ENCOUNTER
Asaf Joyner from [place] states that the HIPAA form for Epidiolex was faxed on 1/24/19 to us but she was told by Dr Ace Billingsley today that we have not received it  Asaf Joyner is going to refax as I also do not see this in pt chart

## 2019-03-18 ENCOUNTER — OFFICE VISIT (OUTPATIENT)
Dept: NEUROLOGY | Facility: CLINIC | Age: 39
End: 2019-03-18
Payer: MEDICARE

## 2019-03-18 VITALS — WEIGHT: 98 LBS | HEIGHT: 63 IN | BODY MASS INDEX: 17.36 KG/M2 | TEMPERATURE: 97.8 F | HEART RATE: 100 BPM

## 2019-03-18 DIAGNOSIS — G40.812 LENNOX-GASTAUT SYNDROME WITH TONIC SEIZURES (HCC): ICD-10-CM

## 2019-03-18 DIAGNOSIS — Z96.89 S/P PLACEMENT OF VNS (VAGUS NERVE STIMULATION) DEVICE: ICD-10-CM

## 2019-03-18 DIAGNOSIS — G40.812 LENNOX-GASTAUT SYNDROME WITH TONIC SEIZURES (HCC): Primary | ICD-10-CM

## 2019-03-18 PROCEDURE — 95976 ALYS SMPL CN NPGT PRGRMG: CPT | Performed by: NURSE PRACTITIONER

## 2019-03-18 PROCEDURE — 99214 OFFICE O/P EST MOD 30 MIN: CPT | Performed by: NURSE PRACTITIONER

## 2019-03-18 RX ORDER — VALPROIC ACID 250 MG/5ML
125 SOLUTION ORAL EVERY 12 HOURS
Qty: 150 ML | Refills: 3 | Status: SHIPPED | OUTPATIENT
Start: 2019-03-18 | End: 2019-04-25

## 2019-03-18 NOTE — ASSESSMENT & PLAN NOTE
Chris Mora presents for follow-up today accompanied by Israel Negro, an aid from her facility who usually works with her  Overall staff reports that she is doing well since starting the Epidiolex and subsequently decreasing the valproic acid and fycompa  She has not had any GTC seizures, but does continue to have her smaller, tonic seizures  Staff is overall unsure of the frequency as she is no longer on one to one observation, but they do feel that they have decreased since her last visit  She is tolerating the Epidiolex well, no significant diarrhea noted, and her LFTs actually look improved from prior studies  During my visit with her today she did have a few clusters of seizures, with about 8 tonic seizures noted in total  Each seizure started with a change in her gaze- her eyes fixed upward and rolling back, followed by some tonic activity of mostly her upper body  Each seizure lasted less than 15-20 seconds  I did swipe the magnet across her VNS during a few of the seizures to see if this made a difference, and it did seem to stop/shorten the seizure by about 10 seconds  Given this response, I did decide to increase her VNS output strength to the next step (2 25/2 5) to see if this would give her even more benefit with her current rapid cycling  I will also plan to increase her Epidiolex to 7 5mg/kg divided BID since I will also be decreasing her valproic acid to 125mg BID  She will continue on her other medications unchanged  I have asked the staff to try and pay attention to her seizure frequency and to call and report any significant changes while we are adjusting her AEDs  We will also plan to repeat LFTs and ammonia levels in a month  Her MRI of the brain is scheduled for later this week and she will follow-up with Dr Davon Cordova in one month as scheduled

## 2019-03-18 NOTE — TELEPHONE ENCOUNTER
Do you need a paper script to submit to the Specialty Pharm[facility]? Or did Susy's script go to the correct pharmacy?

## 2019-03-18 NOTE — PATIENT INSTRUCTIONS
1 - continue with Levetiracetam 100mg/mL give 10mL twice a day  2 - continue with Topiramate 250mg twice a day  3 - decrease Fycompa 0 5mg/mL to 16mL (8mg) at bedtime  4 - decrease valproic acid 250mg/5mL to 2 5mL every 12 hours  5 - increase Banzel 400mg give 4 tabs twice a day  6 - continue with Levocarnitine 1gm/10mL give 7 5mL three times a day  7- continue on Epidiolex 100mg/mL (200mg) twice daily  8- repeat LFTs and ammonia level in 1 month  9- call us to report any worsening seizure activity

## 2019-03-18 NOTE — ASSESSMENT & PLAN NOTE
Model: M105  S/N: [id number]  Date of implant: 11/3/2015  Lead Impedence: 1838 Ohms    VNS Settings (at beginning of visit)  Output current 2 mA   Signal frequency 20 Hz   Pulse width 250 ìsec   On time 7 sec   Off time 0 2 min       Duty Cycle  58%                   Mag current 2 25 mA   Mag pulse width 500 ìsec   Mag on time 60 sec     Battery: 25-50%      VNS Settings (changed during visit)  Output current 2 25 mA   Signal frequency 20 Hz   Pulse width 250 ìsec   On time 7 sec   Off time 0 2 min       Duty Cycle 58%                   Mag current 2 5 mA   Mag pulse width 500 ìsec   Mag on time 60 sec     Battery: 25-50%      Changes made during visit and patient was observed for 20 minutes following application of new settings  She tolerated the changes well, with no evidence of consistent SOB or cough

## 2019-03-18 NOTE — TELEPHONE ENCOUNTER
Please check with Ricardo Leavitt  Patient had a visit today  Recommended increasing Epidiolex to 3mL twice a day due to tonic seizures seen in the office

## 2019-03-18 NOTE — PROGRESS NOTES
Patient ID: Donald Villalobos is a 41 y o  female  Assessment/Plan:    Lennox-Gastaut syndrome with tonic seizures ([facility] [address] )  Bela Boyd presents for follow-up today accompanied by Ivan French, an aid from her facility who usually works with her  Overall staff reports that she is doing well since starting the Epidiolex and subsequently decreasing the valproic acid and fycompa  She has not had any GTC seizures, but does continue to have her smaller, tonic seizures  Staff is overall unsure of the frequency as she is no longer on one to one observation, but they do feel that they have decreased since her last visit  She is tolerating the Epidiolex well, no significant diarrhea noted, and her LFTs actually look improved from prior studies  During my visit with her today she did have a few clusters of seizures, with about 8 tonic seizures noted in total  Each seizure started with a change in her gaze- her eyes fixed upward and rolling back, followed by some tonic activity of mostly her upper body  Each seizure lasted less than 15-20 seconds  I did swipe the magnet across her VNS during a few of the seizures to see if this made a difference, and it did seem to stop/shorten the seizure by about 10 seconds  Given this response, I did decide to increase her VNS output strength to the next step (2 25/2 5) to see if this would give her even more benefit with her current rapid cycling  I will also plan to increase her Epidiolex to 7 5mg/kg divided BID since I will also be decreasing her valproic acid to 125mg BID  She will continue on her other medications unchanged  I have asked the staff to try and pay attention to her seizure frequency and to call and report any significant changes while we are adjusting her AEDs  We will also plan to repeat LFTs and ammonia levels in a month  Her MRI of the brain is scheduled for later this week and she will follow-up with Dr Helena Veras in one month as scheduled        S/P placement of VNS (vagus nerve stimulation) device  Model: M105  S/N: [id number]  Date of implant: 11/3/2015  Lead Impedence: 1838 Ohms    VNS Settings (at beginning of visit)  Output current 2 mA   Signal frequency 20 Hz   Pulse width 250 ìsec   On time 7 sec   Off time 0 2 min       Duty Cycle  58%                   Mag current 2 25 mA   Mag pulse width 500 ìsec   Mag on time 60 sec     Battery: 25-50%      VNS Settings (changed during visit)  Output current 2 25 mA   Signal frequency 20 Hz   Pulse width 250 ìsec   On time 7 sec   Off time 0 2 min       Duty Cycle 58%                   Mag current 2 5 mA   Mag pulse width 500 ìsec   Mag on time 60 sec     Battery: 25-50%      Changes made during visit and patient was observed for 20 minutes following application of new settings  She tolerated the changes well, with no evidence of consistent SOB or cough  Subjective:    Ms Pearl Ortez is a 41 y o  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, remote history of anoxic brain injury, h/o status epilepticus, severe intellectual disability, and frequent medication adverse effects  She has a VNS due to intractable epilepsy  Ms Federico Holden epilepsy has been extremely difficult to control despite the use of 6 antiepileptic medications, complicated by significant side effects of lethargy, hepatic transaminitis, and hyperammoniemia  At her last visit with Dr Gilford Poland on 2/14/19, he decided to initiate Epidiolex due to her continued intractable epilepsy and side effects from her extensive AED regimen  He started Epidiolex with a goal dose of 200mg BID  Valproic acid was decreased to 250mg BID, Banzel was increased to 1600mg BID, and Fycompa was decreased to 8mg daily  Her VNS settings were also changed to maximize the duty cycle  This was done by decreasing her off time  An MRI of the brain was also ordered but has not been done yet- it is scheduled for 3/20/19      Surjit Coleman presents for follow-up today accompanied by Sara De Anda, who is an aid who works with her on a regular basis  Lesley Charles states that Lovena Her has been doing much better since starting the Epidiolex  She has been more awake and alert  No diarrhea  No recent weight loss  She still receives all nutrition via PEG  No new mood or behavior issues  She is sleeping well  Lesley Charles thinks her seizures have been less, but also tells me that they do not really keep track at the nursing home  Labs from 3/15/19:  Alk Phos: 121  SGOT: 69  SGPT: 51  ammonia from 52      Current AEDs:  Valproic acid 250mg/5mL give 5mL two times a day   Banzel 400mg give 4 tabs twice a day   Topiramate 250mg tab twice a day   Levetiracetam 100mg/mL give 10mL twice a day   Fycompa 0 5mg/5mL give 16mL (8mg) at bedtime (oral solution)  Epidiolex 200mg twice a day     levocarnitine 1gm/10mL give 7 5mL three times a day and lactulose for VPA induced hyperammoniemia     Event/Seizure semiology:  Seizure semiology:  1  She was described to have drop attacks or spells with grunting sounds and falling to the floor  2  Her nurse commented on episodes of spasms or myoclonic jerking of the right arm  3  Generalized convulsions  4  Tonic seizures of eyes rolling back (mostly during sleep)    Prior AEDs:  Rufinamide  Clobazam (excessive sedation)  Clonazepam  Levetiracetam (unclear if beneficial)  Lamotrigine (unclear if beneficial)  Topiramate (missed doses caused breakthrough seizures)  Valproate (as medication was weaned off, there were increased seizures)  Fycompa  Felbamate (listed as a drug allergy)  Phenobarbital (contributing to sedation)          The following portions of the patient's history were reviewed and updated as appropriate: allergies, current medications, past medical history, past social history and problem list          Objective:    Pulse 100, temperature 97 8 °F (36 6 °C), height 5' 3" (1 6 m), weight 44 5 kg (98 lb)  Physical Exam    Neurological Exam      ROS:    Review of Systems   Constitutional: Negative    Negative for appetite change and fever  HENT: Negative  Negative for hearing loss, tinnitus, trouble swallowing and voice change  Eyes: Negative  Negative for photophobia and pain  Respiratory: Negative  Negative for shortness of breath  Cardiovascular: Negative  Negative for palpitations  Gastrointestinal: Negative  Negative for nausea and vomiting  Endocrine: Negative  Negative for cold intolerance and heat intolerance  Genitourinary: Negative  Negative for dysuria, frequency and urgency  Musculoskeletal: Negative  Negative for myalgias and neck pain  Skin: Negative  Negative for rash  Allergic/Immunologic: Negative  Neurological: Positive for seizures  Negative for dizziness, tremors, syncope, facial asymmetry, speech difficulty, weakness, light-headedness, numbness and headaches  Hematological: Negative  Does not bruise/bleed easily  Psychiatric/Behavioral: Negative  Negative for confusion, hallucinations and sleep disturbance

## 2019-03-19 NOTE — TELEPHONE ENCOUNTER
After initial start, the epidiolex script can be electronically sent to the specialty pharmacy (the [facility] specialty on file)  It looks like Susy's rx was faxed to the facility and not the pharmacy from her note

## 2019-03-19 NOTE — TELEPHONE ENCOUNTER
Updated script for Epidiolex to [facility] for 3mL (100mg/mL) every 12 hours  Dispense 200mL (one bottle 100mL), 4 refills  Please notify local pharmacy/[facility] that Epidiolex script was supposed to go to [facility]

## 2019-03-20 ENCOUNTER — ANESTHESIA (OUTPATIENT)
Dept: RADIOLOGY | Facility: HOSPITAL | Age: 39
End: 2019-03-20

## 2019-03-20 ENCOUNTER — ANESTHESIA EVENT (OUTPATIENT)
Dept: RADIOLOGY | Facility: HOSPITAL | Age: 39
End: 2019-03-20

## 2019-03-20 ENCOUNTER — HOSPITAL ENCOUNTER (OUTPATIENT)
Dept: RADIOLOGY | Facility: HOSPITAL | Age: 39
Discharge: HOME | End: 2019-03-20
Attending: PSYCHIATRY & NEUROLOGY
Payer: MEDICARE

## 2019-03-20 VITALS
OXYGEN SATURATION: 96 % | HEART RATE: 76 BPM | HEIGHT: 63 IN | RESPIRATION RATE: 14 BRPM | WEIGHT: 100.44 LBS | BODY MASS INDEX: 17.8 KG/M2 | SYSTOLIC BLOOD PRESSURE: 102 MMHG | TEMPERATURE: 98.1 F | DIASTOLIC BLOOD PRESSURE: 63 MMHG

## 2019-03-20 DIAGNOSIS — G40.812 LENNOX-GASTAUT SYNDROME WITH TONIC SEIZURES (HCC): ICD-10-CM

## 2019-03-20 PROCEDURE — 95976 ALYS SMPL CN NPGT PRGRMG: CPT | Performed by: PSYCHIATRY & NEUROLOGY

## 2019-03-20 PROCEDURE — A9585 GADOBUTROL INJECTION: HCPCS | Performed by: PSYCHIATRY & NEUROLOGY

## 2019-03-20 PROCEDURE — 70553 MRI BRAIN STEM W/O & W/DYE: CPT

## 2019-03-20 RX ORDER — ONDANSETRON 2 MG/ML
INJECTION INTRAMUSCULAR; INTRAVENOUS AS NEEDED
Status: DISCONTINUED | OUTPATIENT
Start: 2019-03-20 | End: 2019-03-20 | Stop reason: SURG

## 2019-03-20 RX ORDER — SODIUM CHLORIDE, SODIUM LACTATE, POTASSIUM CHLORIDE, CALCIUM CHLORIDE 600; 310; 30; 20 MG/100ML; MG/100ML; MG/100ML; MG/100ML
125 INJECTION, SOLUTION INTRAVENOUS CONTINUOUS
Status: DISCONTINUED | OUTPATIENT
Start: 2019-03-20 | End: 2019-03-21 | Stop reason: HOSPADM

## 2019-03-20 RX ORDER — NEOSTIGMINE METHYLSULFATE 1 MG/ML
INJECTION INTRAVENOUS AS NEEDED
Status: DISCONTINUED | OUTPATIENT
Start: 2019-03-20 | End: 2019-03-20 | Stop reason: SURG

## 2019-03-20 RX ORDER — METOCLOPRAMIDE HYDROCHLORIDE 5 MG/ML
INJECTION INTRAMUSCULAR; INTRAVENOUS AS NEEDED
Status: DISCONTINUED | OUTPATIENT
Start: 2019-03-20 | End: 2019-03-20 | Stop reason: SURG

## 2019-03-20 RX ORDER — PROPOFOL 10 MG/ML
INJECTION, EMULSION INTRAVENOUS AS NEEDED
Status: DISCONTINUED | OUTPATIENT
Start: 2019-03-20 | End: 2019-03-20 | Stop reason: SURG

## 2019-03-20 RX ORDER — ROCURONIUM BROMIDE 10 MG/ML
INJECTION, SOLUTION INTRAVENOUS AS NEEDED
Status: DISCONTINUED | OUTPATIENT
Start: 2019-03-20 | End: 2019-03-20 | Stop reason: SURG

## 2019-03-20 RX ORDER — LIDOCAINE HYDROCHLORIDE 10 MG/ML
0.5 INJECTION, SOLUTION INFILTRATION; PERINEURAL ONCE AS NEEDED
Status: COMPLETED | OUTPATIENT
Start: 2019-03-20 | End: 2019-03-20

## 2019-03-20 RX ORDER — GLYCOPYRROLATE 0.2 MG/ML
INJECTION INTRAMUSCULAR; INTRAVENOUS AS NEEDED
Status: DISCONTINUED | OUTPATIENT
Start: 2019-03-20 | End: 2019-03-20 | Stop reason: SURG

## 2019-03-20 RX ADMIN — GLYCOPYRROLATE 0.4 MG: 0.2 INJECTION, SOLUTION INTRAMUSCULAR; INTRAVENOUS at 13:16

## 2019-03-20 RX ADMIN — PROPOFOL 120 MG: 10 INJECTION, EMULSION INTRAVENOUS at 12:22

## 2019-03-20 RX ADMIN — SODIUM CHLORIDE, SODIUM LACTATE, POTASSIUM CHLORIDE, AND CALCIUM CHLORIDE 125 ML/HR: .6; .31; .03; .02 INJECTION, SOLUTION INTRAVENOUS at 10:29

## 2019-03-20 RX ADMIN — METOCLOPRAMIDE 10 MG: 5 INJECTION, SOLUTION INTRAMUSCULAR; INTRAVENOUS at 13:16

## 2019-03-20 RX ADMIN — ROCURONIUM BROMIDE 10 MG: 10 INJECTION, SOLUTION INTRAVENOUS at 12:22

## 2019-03-20 RX ADMIN — GADOBUTROL 5 ML: 604.72 INJECTION INTRAVENOUS at 13:52

## 2019-03-20 RX ADMIN — NEOSTIGMINE METHYLSULFATE 3 MG: 1 INJECTION INTRAVENOUS at 13:16

## 2019-03-20 RX ADMIN — ONDANSETRON 4 MG: 2 INJECTION INTRAMUSCULAR; INTRAVENOUS at 13:16

## 2019-03-20 RX ADMIN — LIDOCAINE HYDROCHLORIDE 0.2 ML: 10 INJECTION, SOLUTION INFILTRATION; PERINEURAL at 10:31

## 2019-03-20 NOTE — PROGRESS NOTES
Pt awake  Unable to obtain B/P as pt was flailing arms and legs around  Asked for caregiver bedside  Pt pulling on IV, Pulse Ox  Removed IV  Notified caregiver of transfer to [facility], called [facility] spoke with Adriana  No questions at this time

## 2019-03-20 NOTE — ANESTHESIA PREPROCEDURE EVALUATION
Review of Systems/Medical History  Patient summary reviewed  Chart reviewed  No history of anesthetic complications     Cardiovascular   Pulmonary       GI/Hepatic            Endo/Other     GYN       Hematology   Musculoskeletal       Neurology  Seizures poorly controlled,     Psychology   Psychiatric history,                   Anesthesia Plan  ASA Score- 4     Anesthesia Type- general with ASA Monitors  Additional Monitors:   Airway Plan: ETT  Plan Factors-    Induction- intravenous  Postoperative Plan-     Informed Consent- Anesthetic plan and risks discussed with father

## 2019-03-20 NOTE — DISCHARGE INSTRUCTIONS
Magnetic Resonance Imaging   WHAT YOU NEED TO KNOW:   Magnetic resonance imaging (MRI) is a test that uses magnetic fields and radio waves to take pictures inside of your body  An MRI is used to see blood vessels, tissue, muscles, and bones  It can also show organs, such as your heart, lungs, or liver  An MRI can help your healthcare provider diagnose or treat a medical condition  It does not use radiation  DISCHARGE INSTRUCTIONS:   Drink liquids as directed:  Liquids will help flush the contrast dye out of your body  Ask how much liquid to drink after your MRI, and which liquids to drink  Follow up with your healthcare provider as directed:  Write down your questions so you remember to ask them during your visits  Contact your healthcare provider if:   · You have questions or concerns about your condition or care  Seek care immediately or call 911 if:  You have any signs of an allergic reaction to the contrast dye, such as:  · Trouble breathing    · Dizziness or fainting    · Swelling of your mouth or face    · Nausea or vomiting    · Sudden decrease in urination    · A rash, itching, or swollen skin  © 2017 [address] [facility] Information is for End User's use only and may not be sold, redistributed or otherwise used for commercial purposes  All illustrations and images included in CareNotes® are the copyrighted property of A D A M , Inc  or Jason Chiu  The above information is an  only  It is not intended as medical advice for individual conditions or treatments  Talk to your doctor, nurse or pharmacist before following any medical regimen to see if it is safe and effective for you

## 2019-03-20 NOTE — ANESTHESIA POSTPROCEDURE EVALUATION
Post-Op Assessment Note    CV Status:  Stable    Pain management: adequate     Mental Status:  Awake and agitated   Hydration Status:  Euvolemic   PONV Controlled:  None   Airway Patency:  Patent   Post Op Vitals Reviewed: Yes      Staff: Anesthesiologist           BP      Temp      Pulse     Resp      SpO2

## 2019-03-20 NOTE — PROCEDURES
Neurology/Epilepsy Procedure Note    VNS Reprogramming  Turning off VNS  Indication: patient is going for MRI study    VNS Interrogation    Model: [facility] M105  S/N: [id number]    Current settings:  Output Current 2 25 mAmp   Signal Frequency 20 Hz   Pulse Width 250 microsec   Signal On Time 7 sec   Signal Off Time 0 2 min     Magnet settings:  Mag Output 2 5 mAmp   Pulse Width 500 microsec   Mag On Time 60 sec       Reprogrammed settings:  Output Current 0 mAmp   Signal Frequency 20 Hz   Pulse Width 250 microsec   Signal On Time 7 sec   Signal Off Time 0 2 min     Magnet settings:  Mag Output 0 mAmp   Pulse Width 500 microsec   Mag On Time 60 sec     VNS was interrogated again to verify that the output currents are set to 0 0mA    Output status: 2 25mA, lead Impedance 1807 Ohms OK, Battery 25-50%      Addendum: At [place] [facility], came to reprogram VNS after MRI brain study is completed  Interrogated settings:  Output Current 0 mAmp   Signal Frequency 20 Hz   Pulse Width 250 microsec   Signal On Time 7 sec   Signal Off Time 0 2 min     Magnet settings:  Mag Output 0 mAmp   Pulse Width 500 microsec   Mag On Time 60 sec     With a series of stepped up programming  Output current was initially brought up to 1 mAmp  Waited to see if there was significant pain or coughing  Patient was restless throughout the process  Output current to 1 5mAmp - tolerated  Output current to 1 75mAmp - tolerated  Output current to 2mAmp - tolerated  Output current to 2 25mAmp - restless, facial grimacing, no cough  Unable to determine if there is significant pain decreased output current to 1 75mAmp    Waited 2-3 minutes for a couple of VNS cycles to go through  Output current to 2mAmp - tolerated  Re-attempted 2 25mAmp - subtle facial grimacing, unclear if pain  Brought current back to 2mAmp - tolerated, no cough    Reprogrammed settings:  Output Current 2 00 mAmp   Signal Frequency 20 Hz   Pulse Width 250 microsec   Signal On Time 7 sec Signal Off Time 0 2 min     Magnet settings:  Mag Output 2 50 mAmp   Pulse Width 500 microsec   Mag On Time 60 sec

## 2019-03-25 ENCOUNTER — TELEPHONE (OUTPATIENT)
Dept: NEUROLOGY | Facility: CLINIC | Age: 39
End: 2019-03-25

## 2019-03-25 NOTE — TELEPHONE ENCOUNTER
----- Message from Frances Dan MD sent at 3/22/2019  2:58 PM EDT -----  Please inform parents that her MRI brain study is normal   No stigmata of brain injury

## 2019-04-06 ENCOUNTER — HOSPITAL ENCOUNTER (EMERGENCY)
Facility: HOSPITAL | Age: 39
End: 2019-04-06
Attending: EMERGENCY MEDICINE | Admitting: EMERGENCY MEDICINE
Payer: MEDICARE

## 2019-04-06 VITALS
OXYGEN SATURATION: 97 % | TEMPERATURE: 98.4 F | HEART RATE: 93 BPM | DIASTOLIC BLOOD PRESSURE: 71 MMHG | SYSTOLIC BLOOD PRESSURE: 99 MMHG | RESPIRATION RATE: 22 BRPM

## 2019-04-06 DIAGNOSIS — Z46.59 ENCOUNTER FOR FEEDING TUBE PLACEMENT: Primary | ICD-10-CM

## 2019-04-06 PROCEDURE — 43762 RPLC GTUBE NO REVJ TRC: CPT | Performed by: PHYSICIAN ASSISTANT

## 2019-04-06 PROCEDURE — 99283 EMERGENCY DEPT VISIT LOW MDM: CPT

## 2019-04-06 PROCEDURE — 99282 EMERGENCY DEPT VISIT SF MDM: CPT | Performed by: PHYSICIAN ASSISTANT

## 2019-04-06 RX ORDER — BISACODYL 10 MG
10 SUPPOSITORY, RECTAL RECTAL
COMMUNITY

## 2019-04-10 ENCOUNTER — TELEPHONE (OUTPATIENT)
Dept: NEUROLOGY | Facility: CLINIC | Age: 39
End: 2019-04-10

## 2019-04-25 ENCOUNTER — OFFICE VISIT (OUTPATIENT)
Dept: NEUROLOGY | Facility: CLINIC | Age: 39
End: 2019-04-25
Payer: MEDICARE

## 2019-04-25 VITALS
DIASTOLIC BLOOD PRESSURE: 78 MMHG | SYSTOLIC BLOOD PRESSURE: 120 MMHG | WEIGHT: 102.2 LBS | BODY MASS INDEX: 18.1 KG/M2 | HEART RATE: 92 BPM

## 2019-04-25 DIAGNOSIS — R74.01 TRANSAMINITIS: ICD-10-CM

## 2019-04-25 DIAGNOSIS — E72.20 HYPERAMMONEMIA (HCC): ICD-10-CM

## 2019-04-25 DIAGNOSIS — F79 INTELLECTUAL DISABILITY: ICD-10-CM

## 2019-04-25 DIAGNOSIS — G40.419 OTHER GENERALIZED EPILEPSY, INTRACTABLE, WITHOUT STATUS EPILEPTICUS (HCC): ICD-10-CM

## 2019-04-25 DIAGNOSIS — G40.812 LENNOX-GASTAUT SYNDROME WITH TONIC SEIZURES (HCC): Primary | ICD-10-CM

## 2019-04-25 PROCEDURE — 99215 OFFICE O/P EST HI 40 MIN: CPT | Performed by: PSYCHIATRY & NEUROLOGY

## 2019-04-25 PROCEDURE — 95976 ALYS SMPL CN NPGT PRGRMG: CPT | Performed by: PSYCHIATRY & NEUROLOGY

## 2019-04-25 RX ORDER — LEVETIRACETAM 100 MG/ML
1000 SOLUTION ORAL EVERY 12 HOURS SCHEDULED
Qty: 473 ML | Refills: 6 | Status: ON HOLD | OUTPATIENT
Start: 2019-04-25 | End: 2019-07-03 | Stop reason: SDUPTHER

## 2019-04-29 ENCOUNTER — TELEPHONE (OUTPATIENT)
Dept: NEUROLOGY | Facility: CLINIC | Age: 39
End: 2019-04-29

## 2019-05-21 ENCOUNTER — TELEPHONE (OUTPATIENT)
Dept: NEUROLOGY | Facility: CLINIC | Age: 39
End: 2019-05-21

## 2019-06-04 ENCOUNTER — OFFICE VISIT (OUTPATIENT)
Dept: NEUROLOGY | Facility: CLINIC | Age: 39
End: 2019-06-04
Payer: MEDICARE

## 2019-06-04 ENCOUNTER — TELEPHONE (OUTPATIENT)
Dept: NEUROLOGY | Facility: CLINIC | Age: 39
End: 2019-06-04

## 2019-06-04 VITALS — HEART RATE: 104 BPM | DIASTOLIC BLOOD PRESSURE: 85 MMHG | SYSTOLIC BLOOD PRESSURE: 116 MMHG

## 2019-06-04 DIAGNOSIS — G40.812 LENNOX-GASTAUT SYNDROME WITH TONIC SEIZURES (HCC): Primary | ICD-10-CM

## 2019-06-04 DIAGNOSIS — Z96.89 S/P PLACEMENT OF VNS (VAGUS NERVE STIMULATION) DEVICE: ICD-10-CM

## 2019-06-04 PROCEDURE — 95970 ALYS NPGT W/O PRGRMG: CPT | Performed by: NURSE PRACTITIONER

## 2019-06-04 PROCEDURE — 99214 OFFICE O/P EST MOD 30 MIN: CPT | Performed by: NURSE PRACTITIONER

## 2019-06-04 RX ORDER — LORAZEPAM 0.5 MG/1
TABLET ORAL AS NEEDED
COMMUNITY
End: 2019-08-22

## 2019-06-27 ENCOUNTER — APPOINTMENT (INPATIENT)
Dept: NEUROLOGY | Facility: AMBULATORY SURGERY CENTER | Age: 39
DRG: 101 | End: 2019-06-27
Payer: MEDICARE

## 2019-06-27 ENCOUNTER — APPOINTMENT (EMERGENCY)
Dept: RADIOLOGY | Facility: HOSPITAL | Age: 39
End: 2019-06-27
Payer: MEDICARE

## 2019-06-27 ENCOUNTER — HOSPITAL ENCOUNTER (EMERGENCY)
Facility: HOSPITAL | Age: 39
End: 2019-06-27
Attending: EMERGENCY MEDICINE | Admitting: EMERGENCY MEDICINE
Payer: MEDICARE

## 2019-06-27 ENCOUNTER — APPOINTMENT (EMERGENCY)
Dept: CT IMAGING | Facility: HOSPITAL | Age: 39
End: 2019-06-27
Payer: MEDICARE

## 2019-06-27 ENCOUNTER — HOSPITAL ENCOUNTER (INPATIENT)
Facility: HOSPITAL | Age: 39
LOS: 6 days | Discharge: NURSING, REHAB OR LONG-TERM CARE | DRG: 101 | End: 2019-07-03
Attending: INTERNAL MEDICINE | Admitting: INTERNAL MEDICINE
Payer: MEDICARE

## 2019-06-27 VITALS
HEART RATE: 92 BPM | OXYGEN SATURATION: 96 % | SYSTOLIC BLOOD PRESSURE: 140 MMHG | WEIGHT: 115.3 LBS | BODY MASS INDEX: 20.42 KG/M2 | RESPIRATION RATE: 20 BRPM | DIASTOLIC BLOOD PRESSURE: 74 MMHG | TEMPERATURE: 98.1 F

## 2019-06-27 DIAGNOSIS — Z22.322 MRSA COLONIZATION: ICD-10-CM

## 2019-06-27 DIAGNOSIS — G40.812 LENNOX-GASTAUT SYNDROME WITH TONIC SEIZURES (HCC): ICD-10-CM

## 2019-06-27 DIAGNOSIS — G40.901 STATUS EPILEPTICUS (HCC): Primary | ICD-10-CM

## 2019-06-27 DIAGNOSIS — F79 INTELLECTUAL DISABILITY: ICD-10-CM

## 2019-06-27 DIAGNOSIS — B35.1 ONYCHOMYCOSIS: ICD-10-CM

## 2019-06-27 DIAGNOSIS — R63.6 UNDERWEIGHT: ICD-10-CM

## 2019-06-27 DIAGNOSIS — G40.812 LENNOX-GASTAUT SYNDROME WITH TONIC SEIZURES (HCC): Primary | ICD-10-CM

## 2019-06-27 LAB
ALBUMIN SERPL BCP-MCNC: 3.4 G/DL (ref 3.5–5)
ALP SERPL-CCNC: 185 U/L (ref 46–116)
ALT SERPL W P-5'-P-CCNC: 58 U/L (ref 12–78)
AMMONIA PLAS-SCNC: 26 UMOL/L (ref 11–35)
AMORPH PHOS CRY URNS QL MICRO: ABNORMAL /HPF
ANION GAP SERPL CALCULATED.3IONS-SCNC: 11 MMOL/L (ref 4–13)
APTT PPP: 31 SECONDS (ref 23–37)
AST SERPL W P-5'-P-CCNC: 46 U/L (ref 5–45)
BACTERIA UR QL AUTO: ABNORMAL /HPF
BASOPHILS # BLD AUTO: 0.04 THOUSANDS/ΜL (ref 0–0.1)
BASOPHILS NFR BLD AUTO: 1 % (ref 0–1)
BILIRUB SERPL-MCNC: 0.3 MG/DL (ref 0.2–1)
BILIRUB UR QL STRIP: NEGATIVE
BUN SERPL-MCNC: 12 MG/DL (ref 5–25)
CALCIUM SERPL-MCNC: 9 MG/DL (ref 8.3–10.1)
CHLORIDE SERPL-SCNC: 108 MMOL/L (ref 100–108)
CLARITY UR: ABNORMAL
CO2 SERPL-SCNC: 21 MMOL/L (ref 21–32)
COLOR UR: YELLOW
CREAT SERPL-MCNC: 0.56 MG/DL (ref 0.6–1.3)
EOSINOPHIL # BLD AUTO: 0.06 THOUSAND/ΜL (ref 0–0.61)
EOSINOPHIL NFR BLD AUTO: 1 % (ref 0–6)
ERYTHROCYTE [DISTWIDTH] IN BLOOD BY AUTOMATED COUNT: 12.2 % (ref 11.6–15.1)
GFR SERPL CREATININE-BSD FRML MDRD: 119 ML/MIN/1.73SQ M
GLUCOSE SERPL-MCNC: 160 MG/DL (ref 65–140)
GLUCOSE SERPL-MCNC: 85 MG/DL (ref 65–140)
GLUCOSE UR STRIP-MCNC: NEGATIVE MG/DL
HCT VFR BLD AUTO: 43.3 % (ref 34.8–46.1)
HGB BLD-MCNC: 13.9 G/DL (ref 11.5–15.4)
HGB UR QL STRIP.AUTO: NEGATIVE
IMM GRANULOCYTES # BLD AUTO: 0.02 THOUSAND/UL (ref 0–0.2)
IMM GRANULOCYTES NFR BLD AUTO: 0 % (ref 0–2)
INR PPP: 1.1 (ref 0.84–1.19)
KETONES UR STRIP-MCNC: NEGATIVE MG/DL
LEUKOCYTE ESTERASE UR QL STRIP: NEGATIVE
LYMPHOCYTES # BLD AUTO: 1.39 THOUSANDS/ΜL (ref 0.6–4.47)
LYMPHOCYTES NFR BLD AUTO: 22 % (ref 14–44)
MAGNESIUM SERPL-MCNC: 1.9 MG/DL (ref 1.6–2.6)
MCH RBC QN AUTO: 32.9 PG (ref 26.8–34.3)
MCHC RBC AUTO-ENTMCNC: 32.1 G/DL (ref 31.4–37.4)
MCV RBC AUTO: 102 FL (ref 82–98)
MONOCYTES # BLD AUTO: 0.51 THOUSAND/ΜL (ref 0.17–1.22)
MONOCYTES NFR BLD AUTO: 8 % (ref 4–12)
NEUTROPHILS # BLD AUTO: 4.18 THOUSANDS/ΜL (ref 1.85–7.62)
NEUTS SEG NFR BLD AUTO: 68 % (ref 43–75)
NITRITE UR QL STRIP: NEGATIVE
NON-SQ EPI CELLS URNS QL MICRO: ABNORMAL /HPF
NRBC BLD AUTO-RTO: 0 /100 WBCS
PH UR STRIP.AUTO: 7 [PH]
PLATELET # BLD AUTO: 214 THOUSANDS/UL (ref 149–390)
PMV BLD AUTO: 11.4 FL (ref 8.9–12.7)
POTASSIUM SERPL-SCNC: 3.8 MMOL/L (ref 3.5–5.3)
PROT SERPL-MCNC: 7.6 G/DL (ref 6.4–8.2)
PROT UR STRIP-MCNC: ABNORMAL MG/DL
PROTHROMBIN TIME: 14.2 SECONDS (ref 11.6–14.5)
RBC # BLD AUTO: 4.23 MILLION/UL (ref 3.81–5.12)
RBC #/AREA URNS AUTO: ABNORMAL /HPF
SODIUM SERPL-SCNC: 140 MMOL/L (ref 136–145)
SP GR UR STRIP.AUTO: 1.01 (ref 1–1.03)
TROPONIN I SERPL-MCNC: 0.04 NG/ML
TSH SERPL DL<=0.05 MIU/L-ACNC: 2.91 UIU/ML (ref 0.36–3.74)
UROBILINOGEN UR QL STRIP.AUTO: 0.2 E.U./DL
WBC # BLD AUTO: 6.2 THOUSAND/UL (ref 4.31–10.16)
WBC #/AREA URNS AUTO: ABNORMAL /HPF

## 2019-06-27 PROCEDURE — 95951 HB EEG MONITORING/VIDEORECORD: CPT

## 2019-06-27 PROCEDURE — 81001 URINALYSIS AUTO W/SCOPE: CPT | Performed by: EMERGENCY MEDICINE

## 2019-06-27 PROCEDURE — 80201 ASSAY OF TOPIRAMATE: CPT | Performed by: EMERGENCY MEDICINE

## 2019-06-27 PROCEDURE — 82948 REAGENT STRIP/BLOOD GLUCOSE: CPT

## 2019-06-27 PROCEDURE — 71045 X-RAY EXAM CHEST 1 VIEW: CPT

## 2019-06-27 PROCEDURE — 80177 DRUG SCRN QUAN LEVETIRACETAM: CPT | Performed by: EMERGENCY MEDICINE

## 2019-06-27 PROCEDURE — 84443 ASSAY THYROID STIM HORMONE: CPT | Performed by: EMERGENCY MEDICINE

## 2019-06-27 PROCEDURE — 85730 THROMBOPLASTIN TIME PARTIAL: CPT | Performed by: EMERGENCY MEDICINE

## 2019-06-27 PROCEDURE — 85610 PROTHROMBIN TIME: CPT | Performed by: EMERGENCY MEDICINE

## 2019-06-27 PROCEDURE — 93005 ELECTROCARDIOGRAM TRACING: CPT

## 2019-06-27 PROCEDURE — 84484 ASSAY OF TROPONIN QUANT: CPT | Performed by: EMERGENCY MEDICINE

## 2019-06-27 PROCEDURE — 85025 COMPLETE CBC W/AUTO DIFF WBC: CPT | Performed by: EMERGENCY MEDICINE

## 2019-06-27 PROCEDURE — 99223 1ST HOSP IP/OBS HIGH 75: CPT | Performed by: INTERNAL MEDICINE

## 2019-06-27 PROCEDURE — 82140 ASSAY OF AMMONIA: CPT | Performed by: EMERGENCY MEDICINE

## 2019-06-27 PROCEDURE — 36415 COLL VENOUS BLD VENIPUNCTURE: CPT | Performed by: EMERGENCY MEDICINE

## 2019-06-27 PROCEDURE — 96361 HYDRATE IV INFUSION ADD-ON: CPT

## 2019-06-27 PROCEDURE — 96365 THER/PROPH/DIAG IV INF INIT: CPT

## 2019-06-27 PROCEDURE — 83735 ASSAY OF MAGNESIUM: CPT | Performed by: EMERGENCY MEDICINE

## 2019-06-27 PROCEDURE — 96375 TX/PRO/DX INJ NEW DRUG ADDON: CPT

## 2019-06-27 PROCEDURE — 99285 EMERGENCY DEPT VISIT HI MDM: CPT

## 2019-06-27 PROCEDURE — 80053 COMPREHEN METABOLIC PANEL: CPT | Performed by: EMERGENCY MEDICINE

## 2019-06-27 PROCEDURE — 99285 EMERGENCY DEPT VISIT HI MDM: CPT | Performed by: EMERGENCY MEDICINE

## 2019-06-27 RX ORDER — RUFINAMIDE 200 MG/1
1600 TABLET, FILM COATED ORAL 2 TIMES DAILY
Status: DISCONTINUED | OUTPATIENT
Start: 2019-06-27 | End: 2019-07-03 | Stop reason: HOSPADM

## 2019-06-27 RX ORDER — SODIUM CHLORIDE 9 MG/ML
100 INJECTION, SOLUTION INTRAVENOUS CONTINUOUS
Status: DISCONTINUED | OUTPATIENT
Start: 2019-06-27 | End: 2019-06-27 | Stop reason: HOSPADM

## 2019-06-27 RX ORDER — ONDANSETRON 2 MG/ML
4 INJECTION INTRAMUSCULAR; INTRAVENOUS EVERY 6 HOURS PRN
Status: DISCONTINUED | OUTPATIENT
Start: 2019-06-27 | End: 2019-07-03 | Stop reason: HOSPADM

## 2019-06-27 RX ORDER — ACETAMINOPHEN 160 MG/5ML
650 SUSPENSION, ORAL (FINAL DOSE FORM) ORAL EVERY 6 HOURS PRN
Status: DISCONTINUED | OUTPATIENT
Start: 2019-06-27 | End: 2019-07-03 | Stop reason: HOSPADM

## 2019-06-27 RX ORDER — BISACODYL 10 MG
10 SUPPOSITORY, RECTAL RECTAL
Status: DISCONTINUED | OUTPATIENT
Start: 2019-06-27 | End: 2019-07-03 | Stop reason: HOSPADM

## 2019-06-27 RX ORDER — MIDAZOLAM HYDROCHLORIDE 1 MG/ML
5 INJECTION INTRAMUSCULAR; INTRAVENOUS ONCE
Status: COMPLETED | OUTPATIENT
Start: 2019-06-27 | End: 2019-06-27

## 2019-06-27 RX ORDER — MIDAZOLAM HYDROCHLORIDE 1 MG/ML
2 INJECTION INTRAMUSCULAR; INTRAVENOUS ONCE
Status: DISCONTINUED | OUTPATIENT
Start: 2019-06-27 | End: 2019-06-27

## 2019-06-27 RX ORDER — MULTIVIT WITH IRON,MINERALS
5 LIQUID (ML) ORAL DAILY
Status: DISCONTINUED | OUTPATIENT
Start: 2019-06-28 | End: 2019-07-03 | Stop reason: HOSPADM

## 2019-06-27 RX ORDER — DEXTROSE MONOHYDRATE 25 G/50ML
25 INJECTION, SOLUTION INTRAVENOUS ONCE
Status: COMPLETED | OUTPATIENT
Start: 2019-06-27 | End: 2019-06-27

## 2019-06-27 RX ORDER — LORAZEPAM 2 MG/ML
2 INJECTION INTRAMUSCULAR
Status: DISCONTINUED | OUTPATIENT
Start: 2019-06-27 | End: 2019-07-03 | Stop reason: HOSPADM

## 2019-06-27 RX ORDER — LEVOCARNITINE 1 G/10ML
750 SOLUTION ORAL 3 TIMES DAILY
Status: DISCONTINUED | OUTPATIENT
Start: 2019-06-27 | End: 2019-06-28

## 2019-06-27 RX ORDER — LACTULOSE 20 G/30ML
20 SOLUTION ORAL 3 TIMES DAILY
Status: DISCONTINUED | OUTPATIENT
Start: 2019-06-27 | End: 2019-06-28

## 2019-06-27 RX ORDER — LACTOBACILLUS ACIDOPHILUS / LACTOBACILLUS BULGARICUS 100 MILLION CFU STRENGTH
1 GRANULES ORAL
Status: DISCONTINUED | OUTPATIENT
Start: 2019-06-27 | End: 2019-07-03 | Stop reason: HOSPADM

## 2019-06-27 RX ORDER — LANOLIN ALCOHOL/MO/W.PET/CERES
6 CREAM (GRAM) TOPICAL
Status: DISCONTINUED | OUTPATIENT
Start: 2019-06-27 | End: 2019-07-03 | Stop reason: HOSPADM

## 2019-06-27 RX ORDER — LEVETIRACETAM 100 MG/ML
1000 SOLUTION ORAL EVERY 12 HOURS SCHEDULED
Status: DISCONTINUED | OUTPATIENT
Start: 2019-06-27 | End: 2019-06-29

## 2019-06-27 RX ADMIN — MELATONIN 6 MG: 3 TAB ORAL at 21:41

## 2019-06-27 RX ADMIN — LEVETIRACETAM 1000 MG: 100 SOLUTION ORAL at 21:38

## 2019-06-27 RX ADMIN — LEVETIRACETAM 1000 MG: 100 INJECTION, SOLUTION INTRAVENOUS at 09:20

## 2019-06-27 RX ADMIN — DEXTROSE MONOHYDRATE 25 ML: 25 INJECTION, SOLUTION INTRAVENOUS at 15:40

## 2019-06-27 RX ADMIN — LACTULOSE 20 G: 10 SOLUTION ORAL at 21:37

## 2019-06-27 RX ADMIN — LEVOCARNITINE 750 MG: 1 SOLUTION ORAL at 21:38

## 2019-06-27 RX ADMIN — RUFINAMIDE 1600 MG: 400 TABLET, FILM COATED ORAL at 21:38

## 2019-06-27 RX ADMIN — SODIUM CHLORIDE 100 ML/HR: 0.9 INJECTION, SOLUTION INTRAVENOUS at 12:55

## 2019-06-27 RX ADMIN — LACTOBACILLUS ACIDOPHILUS / LACTOBACILLUS BULGARICUS 1 PACKET: 100 MILLION CFU STRENGTH GRANULES at 21:39

## 2019-06-27 RX ADMIN — TOPIRAMATE 250 MG: 100 TABLET, FILM COATED ORAL at 21:37

## 2019-06-27 NOTE — H&P
H&P- Siomara Jason SFUJJG 1980, 46 y o  female MRN: [id number]    Unit/Bed#: [facility] 061-72 Encounter: [id number]    Primary Care Provider: Ethan Whitman MD   Date and time admitted to hospital: 6/27/2019  6:00 PM        Lennox-Gastaut syndrome with tonic seizures ([facility] [address] )  Assessment & Plan  Possible breakthrough seizures secondary to recent decrease in antiepileptics, which a decrease due to excessive sedation  Seizure was terminated with Versed given in the ambulance  Transferred over for continuous EEG monitoring and management of seizures  Continue Fycompa, Banzel Keppra, Banzel, Topamax, cannabidiol  Consult with epileptologist    S/P placement of VNS (vagus nerve stimulation) device  Assessment & Plan  May need interrogation to investigate whether this is functioning properly, will discuss with epileptologist    Transaminitis  Assessment & Plan  Mild  Likely secondary to antiepileptic use  Monitor periodically    Hyperammonemia (HCC)  Assessment & Plan  Chronic problem, likely secondary to antiepileptics  Ammonia is within normal limits today  Continue lactulose    Intellectual disability  Assessment & Plan  Supportive care  One-to-one observation  Turn q 2 hours    Underweight  Assessment & Plan  Continue tube feeds  Elevate head of bed  Monitor closely for signs of aspiration  History of Present Illness     HPI:  Epifanio Kate is a 46 y o  female who presents with seizure activity  The patient is bed-bound and nonverbal at baseline, history gathering is very limited to medical records only  The patient was noted to have a cluster of seizures in her nursing facility approximately 7 or 8  This was tonic-clonic activity  EMS was called and she was given Versed which terminated her seizure    She was seen in the emergency department and after consultation with Neurology was recommended to come over to [address] [place] [address] for video EEG monitoring and management of her seizures  Review of Systems   Unable to perform ROS: Patient nonverbal       Historical Information   Past Medical History:   Diagnosis Date    ADHD     Anoxic brain damage ([facility] [address] )     Autistic disorder     Hyperammonemia (HCC)     Hyperkeratosis     Hypotension     Intellectual disability     Intellectual disability     Lennox-Gastaut syndrome with tonic seizures (HCC)     Lethargy     Liver enzyme elevation     Onychomycosis     Osteoporosis     Osteoporosis     Psychiatric disorder     anxiety     Past Surgical History:   Procedure Laterality Date    ABDOMINAL SURGERY      CARDIAC PACEMAKER PLACEMENT      vns implant l chest    IR THORACENTESIS  12/17/2018    JEJUNOSTOMY FEEDING TUBE      PEG TUBE PLACEMENT       Social History   Social History     Substance and Sexual Activity   Alcohol Use Not Currently     Social History     Substance and Sexual Activity   Drug Use No     Social History     Tobacco Use   Smoking Status Never Smoker   Smokeless Tobacco Never Used     Family History: non-contributory    Meds/Allergies   all medications and allergies reviewed  Allergies   Allergen Reactions    Felbamate        Objective   Vitals: Blood pressure 133/60, pulse (!) 111, temperature 98 4 °F (36 9 °C), resp  rate 18, SpO2 99 %  No intake or output data in the 24 hours ending 06/27/19 1918    Invasive Devices     Peripheral Intravenous Line            Peripheral IV 06/27/19 Left Hand less than 1 day          Drain            Gastrostomy/Enterostomy Gastrostomy 16 Fr  LUQ 82 days    Urethral Catheter Latex 16 Fr  less than 1 day          Nasogastric/Orogastric Tube            Feeding Tube 876 days    Feeding Tube 146 days                Physical Exam   Constitutional:   Well-nourished middle-aged female, lying in bed spontaneous movements in all 4 extremities   HENT:   Head: Normocephalic and atraumatic  Eyes: Pupils are equal, round, and reactive to light  EOM are normal    Neck: Neck supple  Cardiovascular: Normal rate and regular rhythm  Pulmonary/Chest: Effort normal    Abdominal: Soft  Peg tube without surrounding erythema or drainage   Musculoskeletal: She exhibits no edema  Neurological: She is alert  Moving all 4 extremities   Skin: Skin is warm and dry  Papules and comedones on the face and upper chest       Lab Results: I have personally reviewed pertinent reports  Imaging: I have personally reviewed pertinent reports  EKG, Pathology, and Other Studies: I have personally reviewed pertinent reports  Code Status: Level 1 - Full Code  Advance Directive and Living Will:      Power of :    POLST:      Counseling / Coordination of Care  Total floor / unit time spent today 40 minutes

## 2019-06-27 NOTE — ASSESSMENT & PLAN NOTE
Possible breakthrough seizures secondary to recent decrease in antiepileptics, which a decrease due to excessive sedation  Seizure was terminated with Versed given in the ambulance  Transferred over for continuous EEG monitoring and management of seizures    Continue Fycompa, Banzel Keppra, Banzel, Topamax, cannabidiol  Consult with epileptologist

## 2019-06-27 NOTE — ASSESSMENT & PLAN NOTE
May need interrogation to investigate whether this is functioning properly, will discuss with epileptologist

## 2019-06-27 NOTE — ASSESSMENT & PLAN NOTE
Chronic problem, likely secondary to antiepileptics    Ammonia is within normal limits today  Continue lactulose

## 2019-06-28 ENCOUNTER — APPOINTMENT (INPATIENT)
Dept: NEUROLOGY | Facility: AMBULATORY SURGERY CENTER | Age: 39
DRG: 101 | End: 2019-06-28
Payer: MEDICARE

## 2019-06-28 LAB
ALBUMIN SERPL BCP-MCNC: 3 G/DL (ref 3.5–5)
ALP SERPL-CCNC: 161 U/L (ref 46–116)
ALT SERPL W P-5'-P-CCNC: 54 U/L (ref 12–78)
ANION GAP SERPL CALCULATED.3IONS-SCNC: 8 MMOL/L (ref 4–13)
AST SERPL W P-5'-P-CCNC: 53 U/L (ref 5–45)
ATRIAL RATE: 102 BPM
BASOPHILS # BLD AUTO: 0.04 THOUSANDS/ΜL (ref 0–0.1)
BASOPHILS NFR BLD AUTO: 1 % (ref 0–1)
BILIRUB SERPL-MCNC: 0.31 MG/DL (ref 0.2–1)
BUN SERPL-MCNC: 7 MG/DL (ref 5–25)
CALCIUM SERPL-MCNC: 8.6 MG/DL (ref 8.3–10.1)
CHLORIDE SERPL-SCNC: 109 MMOL/L (ref 100–108)
CO2 SERPL-SCNC: 20 MMOL/L (ref 21–32)
CREAT SERPL-MCNC: 0.46 MG/DL (ref 0.6–1.3)
EOSINOPHIL # BLD AUTO: 0.13 THOUSAND/ΜL (ref 0–0.61)
EOSINOPHIL NFR BLD AUTO: 2 % (ref 0–6)
ERYTHROCYTE [DISTWIDTH] IN BLOOD BY AUTOMATED COUNT: 12.4 % (ref 11.6–15.1)
GFR SERPL CREATININE-BSD FRML MDRD: 127 ML/MIN/1.73SQ M
GLUCOSE SERPL-MCNC: 146 MG/DL (ref 65–140)
HCT VFR BLD AUTO: 37.6 % (ref 34.8–46.1)
HGB BLD-MCNC: 11.9 G/DL (ref 11.5–15.4)
IMM GRANULOCYTES # BLD AUTO: 0.02 THOUSAND/UL (ref 0–0.2)
IMM GRANULOCYTES NFR BLD AUTO: 0 % (ref 0–2)
LYMPHOCYTES # BLD AUTO: 2.64 THOUSANDS/ΜL (ref 0.6–4.47)
LYMPHOCYTES NFR BLD AUTO: 33 % (ref 14–44)
MCH RBC QN AUTO: 31.7 PG (ref 26.8–34.3)
MCHC RBC AUTO-ENTMCNC: 31.6 G/DL (ref 31.4–37.4)
MCV RBC AUTO: 100 FL (ref 82–98)
MONOCYTES # BLD AUTO: 0.73 THOUSAND/ΜL (ref 0.17–1.22)
MONOCYTES NFR BLD AUTO: 9 % (ref 4–12)
NEUTROPHILS # BLD AUTO: 4.45 THOUSANDS/ΜL (ref 1.85–7.62)
NEUTS SEG NFR BLD AUTO: 55 % (ref 43–75)
NRBC BLD AUTO-RTO: 0 /100 WBCS
P AXIS: 62 DEGREES
PLATELET # BLD AUTO: 207 THOUSANDS/UL (ref 149–390)
PMV BLD AUTO: 11.9 FL (ref 8.9–12.7)
POTASSIUM SERPL-SCNC: 3.9 MMOL/L (ref 3.5–5.3)
PR INTERVAL: 140 MS
PROT SERPL-MCNC: 6.9 G/DL (ref 6.4–8.2)
QRS AXIS: 80 DEGREES
QRSD INTERVAL: 66 MS
QT INTERVAL: 338 MS
QTC INTERVAL: 440 MS
RBC # BLD AUTO: 3.75 MILLION/UL (ref 3.81–5.12)
SODIUM SERPL-SCNC: 137 MMOL/L (ref 136–145)
T WAVE AXIS: 55 DEGREES
VENTRICULAR RATE: 102 BPM
WBC # BLD AUTO: 8.01 THOUSAND/UL (ref 4.31–10.16)

## 2019-06-28 PROCEDURE — 99232 SBSQ HOSP IP/OBS MODERATE 35: CPT | Performed by: NURSE PRACTITIONER

## 2019-06-28 PROCEDURE — 93010 ELECTROCARDIOGRAM REPORT: CPT | Performed by: INTERNAL MEDICINE

## 2019-06-28 PROCEDURE — 95951 HB EEG MONITORING/VIDEORECORD: CPT

## 2019-06-28 PROCEDURE — 95976 ALYS SMPL CN NPGT PRGRMG: CPT | Performed by: PSYCHIATRY & NEUROLOGY

## 2019-06-28 PROCEDURE — 99223 1ST HOSP IP/OBS HIGH 75: CPT | Performed by: PHYSICIAN ASSISTANT

## 2019-06-28 PROCEDURE — 85025 COMPLETE CBC W/AUTO DIFF WBC: CPT | Performed by: INTERNAL MEDICINE

## 2019-06-28 PROCEDURE — 80053 COMPREHEN METABOLIC PANEL: CPT | Performed by: INTERNAL MEDICINE

## 2019-06-28 PROCEDURE — 95951 PR EEG MONITORING/VIDEORECORD: CPT | Performed by: PSYCHIATRY & NEUROLOGY

## 2019-06-28 RX ADMIN — LEVOCARNITINE 750 MG: 1 SOLUTION ORAL at 08:17

## 2019-06-28 RX ADMIN — MULTIVITAMIN 5 ML: LIQUID ORAL at 08:17

## 2019-06-28 RX ADMIN — TOPIRAMATE 250 MG: 100 TABLET, FILM COATED ORAL at 08:16

## 2019-06-28 RX ADMIN — LACTOBACILLUS ACIDOPHILUS / LACTOBACILLUS BULGARICUS 1 PACKET: 100 MILLION CFU STRENGTH GRANULES at 18:27

## 2019-06-28 RX ADMIN — LEVETIRACETAM 1000 MG: 100 SOLUTION ORAL at 08:18

## 2019-06-28 RX ADMIN — RUFINAMIDE 1600 MG: 400 TABLET, FILM COATED ORAL at 08:18

## 2019-06-28 RX ADMIN — RUFINAMIDE 1600 MG: 400 TABLET, FILM COATED ORAL at 18:28

## 2019-06-28 RX ADMIN — LEVETIRACETAM 1000 MG: 100 SOLUTION ORAL at 22:40

## 2019-06-28 RX ADMIN — LORAZEPAM 2 MG: 2 INJECTION INTRAMUSCULAR; INTRAVENOUS at 17:09

## 2019-06-28 RX ADMIN — LACTULOSE 20 G: 10 SOLUTION ORAL at 08:16

## 2019-06-28 RX ADMIN — LEVETIRACETAM 1000 MG: 100 INJECTION, SOLUTION INTRAVENOUS at 18:45

## 2019-06-28 RX ADMIN — MELATONIN 6 MG: 3 TAB ORAL at 22:40

## 2019-06-28 RX ADMIN — TOPIRAMATE 250 MG: 100 TABLET, FILM COATED ORAL at 22:40

## 2019-06-28 RX ADMIN — LACTOBACILLUS ACIDOPHILUS / LACTOBACILLUS BULGARICUS 1 PACKET: 100 MILLION CFU STRENGTH GRANULES at 08:19

## 2019-06-28 RX ADMIN — ENOXAPARIN SODIUM 40 MG: 40 INJECTION SUBCUTANEOUS at 08:23

## 2019-06-28 RX ADMIN — LACTOBACILLUS ACIDOPHILUS / LACTOBACILLUS BULGARICUS 1 PACKET: 100 MILLION CFU STRENGTH GRANULES at 13:51

## 2019-06-28 NOTE — ASSESSMENT & PLAN NOTE
Breakthrough seizure events secondary likely due to recent decrease in antiepileptics as Depakote was recently weaned off due to over-sedation vs chronic baseline   Seizure was terminated with Versed given in the ambulance  · Neurology following, appreciate input  · AED regimen prior to admission: Levetiracetam 100 mg/mL 10 mL BID, Topiramate 250 mg BID, Fycompa 0 5 mg/mL 16 mL HS, Banzel 400 mg 4 tabs BID, Epidiolex 100 mg/mL 2 5 mL BID, Cannabidiol daily  · Per neurology, increased evening Epidiolex dose to 100 mg/mL 5 mL and continue 2 5 mL in the morning, increase Cannabidiol to BID dosing, and change VNS settings to conserve battery (place 4 years ago)  · 24 hour EEG monitoring revealed 28 seizures lasting 10-30 seconds each over night   · Supportive care  · Plan to discharge back to nursing home Saturday afternoon once off 1:1 for 24 hours (discontinued 6/28/19 at 3 pm)

## 2019-06-28 NOTE — SOCIAL WORK
ABNER was informed that pt was admitted from [facility]  CM called [facility] and spoke to [facility] in admissions who states pt is a 15 day MA bedhold  [facility] pt receives total assistance with ADL's; pt transfers via chelita lift and WC bound  [facility] pt is not on a 1:1 at the facility at this time  [facility] after reviewing with her DON pt will have to be off 1:1 for 24 hours prior to returning to [facility]  ABNER called and spoke to pt's mother Janet Plaza to discuss dc plan  Janet Plaza [facility] plan is for pt to return to [facility] when medically stable  A post acute care recommendation was made by your care team for STR  Discussed Freedom of Choice with mother Janet Plaza  Choice is to return/continue services  [facility] referral sent to [facility]      Contact: Janet Plaza (mother) [phone]

## 2019-06-28 NOTE — PROGRESS NOTES
Per Dr Dayo Noonan, administered IV ativan to try and break up seizures  Dr Dayo Noonan present at bedside

## 2019-06-28 NOTE — CONSULTS
Consultation - Neurology   Harish Suarez 46 y o  female MRN: [id number]  Unit/Bed#: [facility] 714-01 Encounter: [id number]      Assessment/Plan   Assessment:  41-year-old female with history of Lennox-Gastaut syndrome with intractable seizure disorder presents with multiple breakthrough seizure events  Lennox-Gastaut syndrome with intractable seizure disorder:  -per nursing home, patient had 7-8 tonic seizures in a cluster yesterday morning   -patient's typical events consist of tonic seizures occurring mostly during sleep  Patient can have 10 or more seizures in an hour at her baseline   -she is on multiple AEDs and with was most recently weaned off Depakote due to over-sedation suspected to contribute to aspiration pneumonia  -current regimen is as follows: levetiracetam 100 mg/mL 10 mL b i d , topiramate 250 mg b i d , Fycompa 0 5 mg/mL 16 mL q h s , Banzel 400 mg 4 tabs b i d , and Epidiolex 100 mg/mL 2 5 mL b i d  -she also has a VNS implanted   -while on Depakote, she was having difficulty with transaminitis and hyperammonemia, which are now non problematic   -no signs of UTI, pneumonia, or other infection at this time  History of hyperammonemia:  -ammonia now normal in 26  History of transaminitis on Depakote:  -AST currently 46 and ALT 58  Plan:  1  As discussed with Dr Samia Hilario, the patient's outpatient epileptologist, will increase patient's evening Epidiolex dose to 100mg/mL 5 mL and continue 2 5 mL q a m     All other AED doses will remain the same as listed above  2  Dr Samia Hilario has changed patients VNS settings to conserve battery  3  Patient will remain on continuous video EEG monitoring for now  4  Supportive care as per primary service  5  Discussed with dietary  Consideration for dietitian help to change patient's tube feeds to protein-rich low-carb preparation  Discussed with Dr Samia Hilario      History of Present Illness     Reason for Consult / Principal Problem:  Breakthrough seizure events with history of intractable seizure disorder  Hx and PE limited by:  Patient nonverbal and developmentally handicapped  HPI: Minoo Mckay is a 46 y o  (handedness not determinable) female with past medical history of Lennox-Gastaut syndrome and history of transaminitis and hyperammonemia, hypoxic brain injury, status epilepticus, severe intellectual disability, presents with multiple breakthrough seizure events  The patient resides in a nursing home and is nonverbal at baseline  As result, history was taken from the medical record  Per the emergency department records, the patient had 7-8 tonic seizures in a cluster yesterday morning  Patient may have been observed by a caregiver who does not normally care for her  She does have an intractable seizure disorder managed by Dr Alexandra Swenson  Her seizure semiology typically consists of tonic seizures typically occurring during sleep  She was recently weaned off Depakote due to over-sedation thought to be contributing to aspiration pneumonia  Her medication regimen consists of levetiracetam 100 mg/mL 10 mL b i d , topiramate 250 mg b i d , Fycompa 0 5 mg/mL 16 mL q h s , Banzel 400 mg 4 tabs b i d , and Epidiolex 100 mg/mL 2 5 mL b i d  She was also taking levocarnitine 1 g/10 mL 7 5 mL t i d  for her hyperammonemia  As discussed with Dr Alexandra Swenson, the patient has had many seizures overnight while on continuous video EEG monitoring      Inpatient consult to Neurology  Consult performed by: Nimisha Nieves PA-C  Consult ordered by: Regan Antonio MD          Review of Systems   Unable to perform ROS: Patient nonverbal       Historical Information   Past Medical History:   Diagnosis Date    ADHD     Anoxic brain damage ([facility] [address] )     Autistic disorder     Hyperammonemia (HCC)     Hyperkeratosis     Hypotension     Intellectual disability     Intellectual disability     Lennox-Gastaut syndrome with tonic seizures (HCC)     Lethargy     Liver enzyme elevation     Onychomycosis     Osteoporosis     Osteoporosis     Psychiatric disorder     anxiety     Past Surgical History:   Procedure Laterality Date    ABDOMINAL SURGERY      CARDIAC PACEMAKER PLACEMENT      vns implant l chest    IR THORACENTESIS  2018    JEJUNOSTOMY FEEDING TUBE      PEG TUBE PLACEMENT       Social History   Social History     Substance and Sexual Activity   Alcohol Use Not Currently     Social History     Substance and Sexual Activity   Drug Use No     Social History     Tobacco Use   Smoking Status Never Smoker   Smokeless Tobacco Never Used     Family History: No family history on file  Review of previous medical records was completed  Meds/Allergies   PTA meds:   Prior to Admission Medications   Prescriptions Last Dose Informant Patient Reported? Taking?    Cannabidiol (EPIDIOLEX) 100 MG/ML SOLN  Outside Facility (Specify) No No   Si 5 mL by Per G Tube route every 12 (twelve) hours   LORazepam (ATIVAN) 0 5 mg tablet   Yes No   Sig: Take by mouth as needed for anxiety Give 1 tab prior to dental   MELATONIN PO  Outside Facility (Specify) Yes No   Si mg by Per G Tube route daily at bedtime   Multiple Vitamins-Minerals (MULTIVITAMIN WITH IRON-MINERALS) liquid  Outside Facility (Specify) Yes No   Si mL by Per G Tube route daily   Probiotic Product (ALEXANDER-BID PROBIOTIC PO)  Outside Facility (Specify) Yes No   Si tablet by Per G Tube route daily     acetaminophen (TYLENOL) 325 mg tablet  Outside Facility (Specify) Yes No   Si mg by Per G Tube route every 6 (six) hours as needed for mild pain    bisacodyl (BISCOLAX) 10 mg suppository  Outside Facility (Specify) Yes No   Sig: Insert 10 mg into the rectum daily at bedtime as needed for constipation (if no BM day #4)   lactulose 20 g/30 mL  Outside Facility (Specify) No No   Si mL (20 g total) by Per G Tube route 3 (three) times a day   levETIRAcetam (KEPPRA) 100 mg/mL oral solution  Outside Facility (Specify) No No   Sig: 10 mL (1,000 mg total) by Per G Tube route every 12 (twelve) hours   levOCARNitine (CARNITOR) 1 g/10 mL solution  Outside Facility (Specify) No No   Si 5 mL (750 mg total) by Per G Tube route 3 (three) times a day   magnesium hydroxide (MILK OF MAGNESIA) 400 mg/5 mL oral suspension  Outside Facility (Specify) Yes No   Sig: Take 30 mL by mouth daily as needed for constipation    perampanel (FYCOMPA) oral suspension  Outside Facility (Specify) No No   Si mL (8 mg total) by Per OG Tube route daily   rufinamide (BANZEL) 400 mg tablet  Outside Facility (Specify) No No   Si tablets (1,600 mg total) by Per G Tube route 2 (two) times a day   topiramate (TOPAMAX) 50 MG tablet  Outside Facility (Specify) No No   Si tablets (250 mg total) by Per G Tube route every 12 (twelve) hours      Facility-Administered Medications: None       Allergies   Allergen Reactions    Felbamate        Objective   Vitals:Blood pressure 102/67, pulse 98, temperature 98 4 °F (36 9 °C), resp  rate 19, SpO2 98 %  ,There is no height or weight on file to calculate BMI  Intake/Output Summary (Last 24 hours) at 2019 0918  Last data filed at 2019 0900  Gross per 24 hour   Intake 520 ml   Output 702 ml   Net -182 ml       Invasive Devices: Invasive Devices     Peripheral Intravenous Line            Peripheral IV 19 Left Hand 1 day          Drain            Gastrostomy/Enterostomy LUQ -- days    Gastrostomy/Enterostomy Gastrostomy 16 Fr  LUQ 82 days          Nasogastric/Orogastric Tube            Feeding Tube 876 days    Feeding Tube 146 days              Limited physical exam as patient poorly cooperative and nonverbal   Physical Exam   General:  Patient is of thin body habitus BMI 20 42, and in no acute distress  HEENT:  Head normocephalic  Eyes anicteric  EEG leads in place  Cardiovascular:  With regular rhythm  Lungs:  Clear to auscultation  Abdomen:  No guarding    Extremities:  With no significant edema  Bilateral lower extremity atrophy noted  Neurologic Exam  Patient is alert, attentive to examiner  Does not verbalize  EOMs intact without nystagmus  Blinks to threat bilaterally  No obvious facial asymmetry noted  Gait deferred as patient nonambulatory baseline  Spontaneously moving all 4 extremities without obvious lateralized weakness  Withdraws to noxious stimuli x4  Lab Results:   CBC:   Results from last 7 days   Lab Units 06/28/19  0501 06/27/19  0905   WBC Thousand/uL 8 01 6 20   RBC Million/uL 3 75* 4 23   HEMOGLOBIN g/dL 11 9 13 9   HEMATOCRIT % 37 6 43 3   MCV fL 100* 102*   PLATELETS Thousands/uL 207 214   , BMP/CMP:   Results from last 7 days   Lab Units 06/28/19  0501 06/27/19  0905   SODIUM mmol/L 137 140   POTASSIUM mmol/L 3 9 3 8   CHLORIDE mmol/L 109* 108   CO2 mmol/L 20* 21   BUN mg/dL 7 12   CREATININE mg/dL 0 46* 0 56*   CALCIUM mg/dL 8 6 9 0   AST U/L 53* 46*   ALT U/L 54 58   ALK PHOS U/L 161* 185*   EGFR ml/min/1 73sq m 127 119   , TSH:   Results from last 7 days   Lab Units 06/27/19  0905   TSH 3RD GENERATON uIU/mL 2 910   , Ammonia:   Results from last 7 days   Lab Units 06/27/19  0905   AMMONIA umol/L 26     Imaging Studies: I have personally reviewed pertinent reports  and I have personally reviewed pertinent films in PACS  EKG, Pathology, and Other Studies: I have personally reviewed pertinent reports      VTE Prophylaxis: Enoxaparin (Lovenox)    Code Status: Level 1 - Full Code  Advance Directive and Living Will:      Power of :    POLST:

## 2019-06-28 NOTE — PROCEDURES
Neurology/Epilepsy Procedure Note    VNS Interrogation and Reprogramming     VNS Interrogation    Model: M105  S/N: [id number]  Date of implant: 11/03/2015    Current settings:  Output Current 2 25 mAmp   Signal Frequency 20 Hz   Pulse Width 250 microsec   Signal On Time 7 sec   Signal Off Time 0 2 min     Magnet settings:  Mag Output 2 5 mAmp   Pulse Width 500 microsec   Mag On Time 60 sec     Diagnostics:  Communication OK  Output current 2 25mA  Lead Impedance OK  Impedance value 1853 Ohms  IFI No  Battery indicator 25-50%    Lifetime Magnet activation 40    Reprogrammed settings:  Output Current 2 00 mAmp   Signal Frequency 20 Hz   Pulse Width 250 microsec   Signal On Time 21 sec   Signal Off Time 0 8 min     Magnet settings:  Mag Output 2 25 mAmp   Pulse Width 500 microsec   Mag On Time 60 sec

## 2019-06-28 NOTE — ASSESSMENT & PLAN NOTE
Chronic problem, likely secondary to antiepileptics  Depakote has been weaned off and ammonia level improved   Ammonia wnl at 26  · Discontinue lactulose as Depakote has been weaned off   Discussed with neurology

## 2019-06-28 NOTE — PHYSICIAN ADVISOR
Current patient class: Inpatient  The patient is currently on Hospital Day: 2 at [address] [place] [address]       The patient is  documented to require at least a 2nd midnight in the hospital  As such the patient is expected to satisfy the 2 midnight benchmark and is therefore eligible for inpatient admission  After review of the relevant documentation, labs, vital signs and test results, the patient is appropriate for INPATIENT ADMISSION  Admission to the hospital as an inpatient is a complex decision making process which requires the practitioner to consider the patients presenting complaint, history and physical examination and all relevant testing  With this in mind, in this case, the patient was deemed appropriate for INPATIENT ADMISSION  After review of the documentation and testing available at the time of the admission I concur with this clinical determination of medical necessity  Rationale is as follows: The patient is a 46 yrs Female who presented to the ED at 6/27/2019  6:00 PM with breakthrough seizures-she has history of Lennox-Gastaut syndrome-she received IV Versed given by EMS which terminated the seizure  In the ED neurology was consulted and it was recommended that she  gets transferred to [place] [address]  for which you EEG  She was seen in consultation by Neurology service-she has history of intractable seizure disorder -she had 7-8 tonic seizures requested yesterday morning  She is on multiple AEDs and medications were adjusted  She will remain on continuous video EEG monitoring    She is appropriate for inpatient admission for multiple breakthrough seizure events  The patients vitals on arrival were ED Triage Vitals   Temperature Pulse Respirations Blood Pressure SpO2   06/27/19 1816 06/27/19 1816 06/27/19 1816 06/27/19 1816 06/27/19 1816   98 4 °F (36 9 °C) (!) 111 18 133/60 99 %      Temp src Heart Rate Source Patient Position - Orthostatic VS BP Location FiO2 (%)   -- -- -- -- --             Pain Score       06/27/19 1830       No Pain           Past Medical History:   Diagnosis Date    ADHD     Anoxic brain damage (HCC)     Autistic disorder     Hyperammonemia (HCC)     Hyperkeratosis     Hypotension     Intellectual disability     Intellectual disability     Lennox-Gastaut syndrome with tonic seizures (HCC)     Lethargy     Liver enzyme elevation     Onychomycosis     Osteoporosis     Osteoporosis     Psychiatric disorder     anxiety     Past Surgical History:   Procedure Laterality Date    ABDOMINAL SURGERY      CARDIAC PACEMAKER PLACEMENT      vns implant l chest    IR THORACENTESIS  12/17/2018    JEJUNOSTOMY FEEDING TUBE      PEG TUBE PLACEMENT         The patient was admitted to the hospital at 1800 on 6/27/19 for the following diagnosis:  Seizures ([facility] [address] ) [R56 9]    Consults have been placed to:   IP CONSULT TO NEUROLOGY  IP CONSULT TO CASE MANAGEMENT  IP CONSULT TO NUTRITION SERVICES    Vitals:    06/27/19 2202 06/27/19 2353 06/28/19 0719 06/28/19 1127   BP: (!) 85/59 117/72 102/67    Pulse: 99 90 98    Resp: 18  19    Temp: 98 6 °F (37 °C)  98 4 °F (36 9 °C)    SpO2: 97% 97% 98%    Height:    5' 3" (1 6 m)       Most recent labs:    Recent Labs     06/27/19  0905 06/28/19  0501   WBC 6 20 8 01   HGB 13 9 11 9   HCT 43 3 37 6    207   K 3 8 3 9   CALCIUM 9 0 8 6   BUN 12 7   CREATININE 0 56* 0 46*   INR 1 10  --    TROPONINI 0 04  --    AST 46* 53*   ALT 58 54   ALKPHOS 185* 161*       Scheduled Meds:  Current Facility-Administered Medications:  acetaminophen 650 mg Per G Tube Q6H PRN Carolin Garrido MD   bisacodyl 10 mg Rectal HS PRN Carolin Garrido MD   [START ON 6/29/2019] Cannabidiol 2 5 mL Gastrostomy Tube Daily Lorene Bae PA-C   Cannabidiol 5 mL Per G Tube QPM Lorene Dorsey PA-C   enoxaparin 40 mg Subcutaneous Daily Carolin Garrido MD   lactobacillus acidophilus-bulgaricus 1 packet Per PEG Tube TID With Meals Bulmaro An MD   levETIRAcetam 1,000 mg Per G Tube Q12H [facility] Bulmaro An MD   LORazepam 2 mg Intravenous Q5 Min PRN Bulmaro An MD   melatonin 6 mg Per G Tube HS Bulmaro An MD   multivitamin with iron-minerals 5 mL Per G Tube Daily Bulmaro An MD   ondansetron 4 mg Intravenous Q6H PRN Bulmaro An MD   perampanel 8 mg Per OG Tube BID Lorene Dorsey PA-C   rufinamide 1,600 mg Per G Tube BID Bulmaro An MD   topiramate 250 mg Per G Tube Q12H [facility] Bulmaro An MD     Continuous Infusions:   PRN Meds:   acetaminophen    bisacodyl    LORazepam    ondansetron    Surgical procedures (if appropriate):

## 2019-06-28 NOTE — ASSESSMENT & PLAN NOTE
Consulted dietary  · Continue tube feeds   Add free water flushes  · Bladder scan Q shift  · Elevate head of bed, aspiration precuations

## 2019-06-28 NOTE — PROGRESS NOTES
Progress Note - Minoo Mckay 1980, 46 y o  female MRN: [id number]    [facility] Encounter: [id number]    Primary Care Provider: Gilbert Guzmán MD   Date and time admitted to hospital: 6/27/2019  6:00 PM        * Lennox-Gastaut syndrome with tonic seizures (HCC)  Assessment & Plan  Breakthrough seizure events secondary likely due to recent decrease in antiepileptics as Depakote was recently weaned off due to over-sedation vs chronic baseline   Seizure was terminated with Versed given in the ambulance  · Neurology following, appreciate input  · AED regimen prior to admission: Levetiracetam 100 mg/mL 10 mL BID, Topiramate 250 mg BID, Fycompa 0 5 mg/mL 16 mL  HS, Banzel 400 mg 4 tabs BID, Epidiolex 100 mg/mL 2 5 mL BID, Cannabidiol daily  · Per neurology, increased evening Epidiolex dose to 100 mg/mL 5 mL and continue 2 5 mL in the morning, increase Cannabidiol to BID dosing, and change VNS settings to conserve battery (place 4 years ago)  · 24 hour EEG monitoring revealed 28 seizures lasting 10-30 seconds each over night   · Supportive care  · Plan to discharge back to nursing home Saturday afternoon once off 1:1 for 24 hours (discontinued 6/28/19 at 3 pm)    S/P placement of VNS (vagus nerve stimulation) device  Assessment & Plan  Neurology changed patient's VNS settings to conserve battery     Transaminitis  Assessment & Plan  Mild   AST 46, ALT 58  Likely secondary to antiepileptic use, Depakote has been weaned off   · Monitor CMP periodically    Underweight  Assessment & Plan  Consulted dietary for tube feed adjustment as patient when benefit from a ketogenic diet  · Dietary recommends stating Glucerna 1 2 at 50 mL/hr for 16 hours and possibly increasing to 20 hour feeds if tolerated  · Start free water flushes, 150 mL every 6 hours   · Add 1 packet of prosource to EN  · Bladder scan Q shift  · Elevate head of bed, aspiration precuations     Hyperammonemia (HCC)  Assessment & Plan  Chronic problem, likely secondary to antiepileptics  Depakote has been weaned off and ammonia level improved   Ammonia wnl at 26  · Discontinue lactulose as Depakote has been weaned off  Discussed with neurology     Intellectual disability  Assessment & Plan  Supportive care  Discontinue 1:1 observation  Turn Q2 hours    VTE Pharmacologic Prophylaxis:   Pharmacologic: Enoxaparin (Lovenox)  Mechanical VTE Prophylaxis in Place: Yes    Patient Centered Rounds: I have performed bedside rounds with nursing staff today  Discussions with Specialists or Other Care Team Provider: Nursing, CM, neurology    Education and Discussions with Family / Patient: I have answered all questions to the best of my ability  Time Spent for Care: 20 minutes  More than 50% of total time spent on counseling and coordination of care as described above  Current Length of Stay: 1 day(s)    Current Patient Status: Inpatient   Certification Statement: The patient will continue to require additional inpatient hospital stay due to breakthrough seizure requiring medication adjustment and monitoring    Discharge Plan: Patient is not medically stable for discharge today, likely back to nursing home tomorrow afternoon (needs to be 24 hours off 1:1 which was discontinued 19 at 3 pm)    Code Status: Level 1 - Full Code      Subjective:   Nonverbal at baseline  Cooperative during exam  No signs of distress  Smiled a few times  Tolerating tube feeds, Primary nurse reports no urine output today  Objective:     Vitals:   Temp (24hrs), Av 4 °F (36 9 °C), Min:98 1 °F (36 7 °C), Max:98 6 °F (37 °C)    Temp:  [98 1 °F (36 7 °C)-98 6 °F (37 °C)] 98 4 °F (36 9 °C)  HR:  [] 98  Resp:  [18-20] 19  BP: ()/(59-76) 102/67  SpO2:  [96 %-99 %] 98 %  Body mass index is 20 42 kg/m²  Input and Output Summary (last 24 hours):        Intake/Output Summary (Last 24 hours) at 2019 1457  Last data filed at 2019 1448  Gross per 24 hour Intake 610 ml   Output 710 ml   Net -100 ml       Physical Exam:     Physical Exam   Constitutional: She appears well-developed  No distress  HENT:   Head: Normocephalic  Neck: Normal range of motion  Cardiovascular: Normal rate and regular rhythm  Pulmonary/Chest: Effort normal  No accessory muscle usage  No tachypnea  No respiratory distress  She has decreased breath sounds in the right lower field and the left lower field  Abdominal: Soft  Bowel sounds are normal  She exhibits no distension  There is no tenderness  Musculoskeletal: Normal range of motion  She exhibits no edema  Neurological: She is alert  Skin: Skin is warm and dry  She is not diaphoretic  Psychiatric: She is slowed and withdrawn  Cognition and memory are impaired  She expresses inappropriate judgment  She is noncommunicative  Nursing note and vitals reviewed  Additional Data:     Labs:    Results from last 7 days   Lab Units 06/28/19  0501   WBC Thousand/uL 8 01   HEMOGLOBIN g/dL 11 9   HEMATOCRIT % 37 6   PLATELETS Thousands/uL 207   NEUTROS PCT % 55   LYMPHS PCT % 33   MONOS PCT % 9   EOS PCT % 2     Results from last 7 days   Lab Units 06/28/19  0501   POTASSIUM mmol/L 3 9   CHLORIDE mmol/L 109*   CO2 mmol/L 20*   BUN mg/dL 7   CREATININE mg/dL 0 46*   CALCIUM mg/dL 8 6   ALK PHOS U/L 161*   ALT U/L 54   AST U/L 53*     Results from last 7 days   Lab Units 06/27/19  0905   INR  1 10       * I Have Reviewed All Lab Data Listed Above  * Additional Pertinent Lab Tests Reviewed:  All Labs Within Last 24 Hours Reviewed    Imaging:    Imaging Reports Reviewed Today Include: CXR  Imaging Personally Reviewed by Myself Includes:  None     Recent Cultures (last 7 days):           Last 24 Hours Medication List:     Current Facility-Administered Medications:  acetaminophen 650 mg Per G Tube Q6H PRN Tanya Lu MD   bisacodyl 10 mg Rectal HS PRN Tanya Lu MD   [START ON 6/29/2019] Cannabidiol 2 5 mL Gastrostomy Tube Daily Lorene Dorsey PA-C   Cannabidiol 5 mL Per G Tube QPM Loreen Dorsey PA-C   enoxaparin 40 mg Subcutaneous Daily Sonia Joyner MD   lactobacillus acidophilus-bulgaricus 1 packet Per PEG Tube TID With Meals Sonia Joyner MD   levETIRAcetam 1,000 mg Per G Tube Q12H [address] Sonia Joyner MD   LORazepam 2 mg Intravenous Q5 Min PRN Sonia Joyner MD   melatonin 6 mg Per G Tube HS Sonia Joyner MD   multivitamin with iron-minerals 5 mL Per G Tube Daily Sonia Joyner MD   ondansetron 4 mg Intravenous Q6H PRN Sonia Joyner MD   perampanel 8 mg Per OG Tube BID Lorene Dorsey PA-C   rufinamide 1,600 mg Per G Tube BID Sonia Joyner MD   topiramate 250 mg Per G Tube Q12H [address] Sonia Joyner MD        Today, Patient Was Seen By: KATHY Landers    ** Please Note: Dictation voice to text software may have been used in the creation of this document   **

## 2019-06-28 NOTE — NUTRITION
06/28/19 1129   Assessment   Timepoint Initial  (New Admission EN)   Labs   List Completed Labs   (6/28 Creat   46, Glu 146, AST 53, Alb 3 0     Meds reviewed)   Feeding Route   Swallow   (History of Aspiration)   Formula Jevity 1 2   Formula rate 50ml    Cycle  16hr      Continuous Pump Yes   Free Water From  mL   Total Kcal intake 960   Protein Intake (g) 44 4 g   Cho Intake (g) 136 g   Adequacy of Intake   Nutrition Modality NPO   Estimated Calorie Intake 50-75%  (74%)   Estimated Protein Intake  %  (85)   Estimated Fluid Intake 50-75%  (50%)   Nutrition Prognosis   Nutrition Concerns Other (Comment)  (Nutrition support as sole source of nutrition, inability to perform self care)   Comorbid Concerns Other (Comment)  (Lennox-Gastaut syndrome with tonic seizures )   Nutrition Precautions Aspiration  (Elevate head of bed)   Nutrition Considerations Ability to learn  (Not warranted r/t Intellectual disability)   PES Statement   Problem Intake   Energy Balance (1) Inadequate energy intake NI-1 2   Related to Inadequate EN/PN   As evidenced by: Other (Comment)  (RD assessment)   Patient Nutrition Goals   Goal meet enteral/parenteral needs   Goal Status initiated   Timeframe to complete goal by next f/u   Recommendations/Interventions   Summary Patient admitted with EN as sole source of nutrition  In past did have pleasure feeds however due to aspiration now NPO  She appears well nourished from RD physical assessment  Current weight shows an unexplained weight gain of 15 lb from usual body weight (98-102lb) and x 5 months  Contacted by Neurology to recommend EN formula with lower carbohydrate % in relation to patients condition in seizure management  Malnutrition/BMI Present No  (Does not meet characteristics)   Interventions EN change formula/rate;EN flush change (specify)   Nutrition Recommendations Adjust EN/PN  (Recommend D/C Jevity 1 2   Consider Glucerna1 2 50ml x16hr(800mlTV) to provide 960kcal, 92grm CHO, 48grm Pro, 48grm Fat, 648ml FW  Add 1 packet Prosource to EN  Suggest Flush order 150ml every 6hours  Monitor wt closely to assess increase EN rate to 20hr )   Nutrition Complexity Risk   Nutrition complexity level High risk   Follow up date 07/01/19  (k EN tolerance/Weight)

## 2019-06-28 NOTE — PLAN OF CARE
Problem: Potential for Falls  Goal: Patient will remain free of falls  Description  INTERVENTIONS:  - Assess patient frequently for physical needs  -  Identify cognitive and physical deficits and behaviors that affect risk of falls    -  [place] fall precautions as indicated by assessment   - Educate patient/family on patient safety including physical limitations  - Instruct patient to call for assistance with activity based on assessment  - Modify environment to reduce risk of injury  - Consider OT/PT consult to assist with strengthening/mobility  Outcome: Progressing     Problem: Prexisting or High Potential for Compromised Skin Integrity  Goal: Skin integrity is maintained or improved  Description  INTERVENTIONS:  - Identify patients at risk for skin breakdown  - Assess and monitor skin integrity  - Assess and monitor nutrition and hydration status  - Monitor labs (i e  albumin)  - Assess for incontinence   - Turn and reposition patient  - Assist with mobility/ambulation  - Relieve pressure over bony prominences  - Avoid friction and shearing  - Provide appropriate hygiene as needed including keeping skin clean and dry  - Evaluate need for skin moisturizer/barrier cream  - Collaborate with interdisciplinary team (i e  Nutrition, Rehabilitation, etc )   - Patient/family teaching  Outcome: Progressing     Problem: SAFETY ADULT  Goal: Maintain or return to baseline ADL function  Description  INTERVENTIONS:  -  Assess patient's ability to carry out ADLs; assess patient's baseline for ADL function and identify physical deficits which impact ability to perform ADLs (bathing, care of mouth/teeth, toileting, grooming, dressing, etc )  - Assess/evaluate cause of self-care deficits   - Assess range of motion  - Assess patient's mobility; develop plan if impaired  - Assess patient's need for assistive devices and provide as appropriate  - Encourage maximum independence but intervene and supervise when necessary  ¯ Involve family in performance of ADLs  ¯ Assess for home care needs following discharge   ¯ Request OT consult to assist with ADL evaluation and planning for discharge  ¯ Provide patient education as appropriate  Outcome: Progressing  Goal: Maintain or return mobility status to optimal level  Description  INTERVENTIONS:  - Assess patient's baseline mobility status (ambulation, transfers, stairs, etc )    - Identify cognitive and physical deficits and behaviors that affect mobility  - Identify mobility aids required to assist with transfers and/or ambulation (gait belt, sit-to-stand, lift, walker, cane, etc )  - [place] fall precautions as indicated by assessment  - Record patient progress and toleration of activity level on Mobility SBAR; progress patient to next Phase/Stage  - Instruct patient to call for assistance with activity based on assessment  - Request Rehabilitation consult to assist with strengthening/weightbearing, etc   Outcome: Progressing     Problem: DISCHARGE PLANNING  Goal: Discharge to home or other facility with appropriate resources  Description  INTERVENTIONS:  - Identify barriers to discharge w/patient and caregiver  - Arrange for needed discharge resources and transportation as appropriate  - Identify discharge learning needs (meds, wound care, etc )  - Arrange for interpretive services to assist at discharge as needed  - Refer to Case Management Department for coordinating discharge planning if the patient needs post-hospital services based on physician/advanced practitioner order or complex needs related to functional status, cognitive ability, or social support system  Outcome: Progressing     Problem: Knowledge Deficit  Goal: Patient/family/caregiver demonstrates understanding of disease process, treatment plan, medications, and discharge instructions  Description  Complete learning assessment and assess knowledge base    Interventions:  - Provide teaching at level of understanding  - Provide teaching via preferred learning methods  Outcome: Progressing     Problem: NEUROSENSORY - ADULT  Goal: Achieves stable or improved neurological status  Description  INTERVENTIONS  - Monitor and report changes in neurological status  - Initiate measures to prevent increased intracranial pressure  - Maintain blood pressure and fluid volume within ordered parameters to optimize cerebral perfusion  - Monitor temperature, glucose, and sodium or any other associated labs   Initiate appropriate interventions as ordered  - Monitor for seizure activity   - Administer anti-seizure medications as ordered  Outcome: Progressing  Goal: Absence of seizures  Description  INTERVENTIONS  - Monitor for seizure activity  - Administer anti-seizure medications as ordered  - Monitor neurological status  Outcome: Progressing  Goal: Remains free of injury related to seizures activity  Description  INTERVENTIONS  - Maintain airway, patient safety  and administer oxygen as ordered  - Monitor patient for seizure activity, document and report duration and description of seizure to physician/advanced practitioner  - If seizure occurs,  ensure patient safety during seizure  - Reorient patient post seizure  - Seizure pads on all 4 side rails  - Instruct patient/family to notify RN of any seizure activity including if an aura is experienced  - Instruct patient/family to call for assistance with activity based on nursing assessment  - Administer anti-seizure medications as ordered  - Monitor fetal well being  Outcome: Progressing  Goal: Achieves maximal functionality and self care  Description  INTERVENTIONS  - Monitor swallowing and airway patency with patient fatigue and changes in neurological status  - Encourage and assist patient to increase activity and self care with guidance from rehab services  - Encourage visually impaired, hearing impaired and aphasic patients to use assistive/communication devices  Outcome: Progressing     Problem: Nutrition/Hydration-ADULT  Goal: Nutrient/Hydration intake appropriate for improving, restoring or maintaining nutritional needs  Description  Monitor and assess patient's nutrition/hydration status for malnutrition (ex- brittle hair, bruises, dry skin, pale skin and conjunctiva, muscle wasting, smooth red tongue, and disorientation)  Collaborate with interdisciplinary team and initiate plan and interventions as ordered  Monitor patient's weight and dietary intake as ordered or per policy  Utilize nutrition screening tool and intervene per policy  Determine patient's food preferences and provide high-protein, high-caloric foods as appropriate       INTERVENTIONS:  - Monitor oral intake, urinary output, labs, and treatment plans  - Assess nutrition and hydration status and recommend course of action  - Evaluate amount of meals eaten  - Assist patient with eating if necessary   - Allow adequate time for meals  - Recommend/ encourage appropriate diets, oral nutritional supplements, and vitamin/mineral supplements  - Order, calculate, and assess calorie counts as needed  - Recommend, monitor, and adjust tube feedings and TPN/PPN based on assessed needs  - Assess need for intravenous fluids  - Provide specific nutrition/hydration education as appropriate  - Include patient/family/caregiver in decisions related to nutrition  Outcome: Progressing

## 2019-06-28 NOTE — UTILIZATION REVIEW
Initial Clinical Review    Admission: Date/Time/Statement: 6/27/19 @ 1800 Direct Admit     Orders Placed This Encounter   Procedures    Inpatient Admission     Standing Status:   Standing     Number of Occurrences:   1     Order Specific Question:   Admitting Physician     Answer:   Abiodun Funes [[id number]]     Order Specific Question:   Level of Care     Answer:   Med Surg [[id number]]     Order Specific Question:   Estimated length of stay     Answer:   More than 2 Midnights     Order Specific Question:   Certification     Answer:   I certify that inpatient services are medically necessary for this patient for a duration of greater than two midnights  See H&P and MD Progress Notes for additional information about the patient's course of treatment  No chief complaint on file  Assessment/Plan:39 y o  female  who presents with seizure activity  The patient is bed-bound and nonverbal at baseline, history gathering is very limited to medical records only  The patient was noted to have a cluster of seizures in her nursing facility approximately 7 or 8  This was tonic-clonic activity  EMS was called and she was given Versed which terminated her seizure  She was seen in the emergency department and after consultation with Neurology was recommended to come over to [address] [place] [address] for video EEG monitoring and management of her seizures  Lennox-Gastaut syndrome with tonic seizures (HCC)  Assessment & Plan  Possible breakthrough seizures secondary to recent decrease in antiepileptics, which a decrease due to excessive sedation  Seizure was terminated with Versed given in the ambulance  Transferred over for continuous EEG monitoring and management of seizures    Continue Fycompa, Banzel Keppra, Banzel, Topamax, cannabidiol  Consult with epileptologist   S/P placement of VNS (vagus nerve stimulation) device  Assessment & Plan  May need interrogation to investigate whether this is functioning properly, will discuss with epileptologist   Transaminitis  Assessment & Plan  Mild  Likely secondary to antiepileptic use  Monitor periodically   Hyperammonemia (HCC)  Assessment & Plan  Chronic problem, likely secondary to antiepileptics  Ammonia is within normal limits today  Continue lactulose   Intellectual disability  Assessment & Plan  Supportive care  One-to-one observation  Turn q 2 hours   Underweight  Assessment & Plan  Continue tube feeds  Elevate head of bed    Monitor closely for signs of aspiration          Temperature Pulse Respirations Blood Pressure SpO2   06/27/19 1816 06/27/19 1816 06/27/19 1816 06/27/19 1816 06/27/19 1816   98 4 °F (36 9 °C) (!) 111 18 133/60 99 %      Temp src Heart Rate Source Patient Position - Orthostatic VS BP Location FiO2 (%)   -- -- -- -- --             Pain Score       06/27/19 1830       No Pain        Wt Readings from Last 1 Encounters:   06/27/19 52 3 kg (115 lb 4 8 oz)     Additional Vital Signs:   06/28/19 07:19:10  98 4 °F (36 9 °C)  98  19  102/67  79  98 %   06/27/19 23:53:45  --  90  --  117/72  87  97 %   06/27/19 22:02:10  98 6 °F (37 °C)  99  18  85/59Abnormal          Pertinent Labs/Diagnostic Test Results:   Results from last 7 days   Lab Units 06/28/19  0501 06/27/19  0905   WBC Thousand/uL 8 01 6 20   HEMOGLOBIN g/dL 11 9 13 9   HEMATOCRIT % 37 6 43 3   PLATELETS Thousands/uL 207 214   NEUTROS ABS Thousands/µL 4 45 4 18     Results from last 7 days   Lab Units 06/28/19  0501 06/27/19  0905   SODIUM mmol/L 137 140   POTASSIUM mmol/L 3 9 3 8   CHLORIDE mmol/L 109* 108   CO2 mmol/L 20* 21   ANION GAP mmol/L 8 11   BUN mg/dL 7 12   CREATININE mg/dL 0 46* 0 56*   EGFR ml/min/1 73sq m 127 119   CALCIUM mg/dL 8 6 9 0   MAGNESIUM mg/dL  --  1 9     Results from last 7 days   Lab Units 06/28/19  0501 06/27/19  0905   AST U/L 53* 46*   ALT U/L 54 58   ALK PHOS U/L 161* 185*   TOTAL PROTEIN g/dL 6 9 7 6   ALBUMIN g/dL 3 0* 3 4*   TOTAL BILIRUBIN mg/dL 0 31 0 30     Results from last 7 days   Lab Units 06/27/19  1512   POC GLUCOSE mg/dl 85     Results from last 7 days   Lab Units 06/28/19  0501 06/27/19  0905   GLUCOSE RANDOM mg/dL 146* 160*     Results from last 7 days   Lab Units 06/27/19  0905   TROPONIN I ng/mL 0 04     Results from last 7 days   Lab Units 06/27/19  0905   PROTIME seconds 14 2   INR  1 10   PTT seconds 31     Results from last 7 days   Lab Units 06/27/19  0938   CLARITY UA  Cloudy   COLOR UA  Yellow   SPEC GRAV UA  1 015   PH UA  7 0   GLUCOSE UA mg/dl Negative   KETONES UA mg/dl Negative   BLOOD UA  Negative   PROTEIN UA mg/dl Trace*   NITRITE UA  Negative   BILIRUBIN UA  Negative   UROBILINOGEN UA E U /dl 0 2   LEUKOCYTES UA  Negative   WBC UA /hpf 2-4*   RBC UA /hpf None Seen   BACTERIA UA /hpf Occasional   EPITHELIAL CELLS WET PREP /hpf Occasional       Past Medical History:   Diagnosis Date    ADHD     Anoxic brain damage (HCC)     Autistic disorder     Hyperammonemia (HCC)     Hyperkeratosis     Hypotension     Intellectual disability     Intellectual disability     Lennox-Gastaut syndrome with tonic seizures (HCC)     Lethargy     Liver enzyme elevation     Onychomycosis     Osteoporosis     Osteoporosis     Psychiatric disorder     anxiety     Present on Admission:   Lennox-Gastaut syndrome with tonic seizures (HCC)   Intellectual disability   Hyperammonemia (HCC)   Transaminitis   Underweight      Ad[facility]: [facility] [R56 9]  Age/Sex: 46 y o  female  Admission Orders:    Current Facility-Administered Medications:  acetaminophen 650 mg Per G Tube Q6H PRN    bisacodyl 10 mg Rectal HS PRN    [START ON 6/29/2019] Cannabidiol 2 5 mL Gastrostomy Tube Daily    Cannabidiol 5 mL Per G Tube QPM    enoxaparin 40 mg Subcutaneous Daily    lactobacillus acidophilus-bulgaricus 1 packet Per PEG Tube TID With Meals    lactulose 20 g Per G Tube TID    levETIRAcetam 1,000 mg Per G Tube Q12H [address]    levOCARNitine 750 mg Per G Tube TID LORazepam 2 mg Intravenous Q5 Min PRN    melatonin 6 mg Per G Tube HS    multivitamin with iron-minerals 5 mL Per G Tube Daily    ondansetron 4 mg Intravenous Q6H PRN    perampanel 8 mg Per OG Tube BID    rufinamide 1,600 mg Per G Tube BID    topiramate 250 mg Per G Tube Q12H [address]      I&O  Elevate HOB   SCD  Up w / assist  EEG 24 hr   IP CONSULT TO NEUROLOGY  IP CONSULT TO CASE MANAGEMENT  [facility]  Phone: [phone]; Fax [phone]  [email or web address]  ATTENTION: Please call with any questions or concerns to [phone]  and carefully listen to the prompts so that you are directed to the right person  Send all requests for admission clinical reviews, approved or denied determinations and any other requests to fax [phone]   All voicemails are confidential

## 2019-06-28 NOTE — ASSESSMENT & PLAN NOTE
Mild  AST 46, ALT 58  Likely secondary to antiepileptic use, Depakote has been weaned off   · Monitor CMP periodically

## 2019-06-28 NOTE — PLAN OF CARE
Problem: Potential for Falls  Goal: Patient will remain free of falls  Description  INTERVENTIONS:  - Assess patient frequently for physical needs  -  Identify cognitive and physical deficits and behaviors that affect risk of falls    -  [place] fall precautions as indicated by assessment   - Educate patient/family on patient safety including physical limitations  - Instruct patient to call for assistance with activity based on assessment  - Modify environment to reduce risk of injury  - Consider OT/PT consult to assist with strengthening/mobility  Outcome: Progressing     Problem: Prexisting or High Potential for Compromised Skin Integrity  Goal: Skin integrity is maintained or improved  Description  INTERVENTIONS:  - Identify patients at risk for skin breakdown  - Assess and monitor skin integrity  - Assess and monitor nutrition and hydration status  - Monitor labs (i e  albumin)  - Assess for incontinence   - Turn and reposition patient  - Assist with mobility/ambulation  - Relieve pressure over bony prominences  - Avoid friction and shearing  - Provide appropriate hygiene as needed including keeping skin clean and dry  - Evaluate need for skin moisturizer/barrier cream  - Collaborate with interdisciplinary team (i e  Nutrition, Rehabilitation, etc )   - Patient/family teaching  Outcome: Progressing     Problem: SAFETY ADULT  Goal: Maintain or return to baseline ADL function  Description  INTERVENTIONS:  -  Assess patient's ability to carry out ADLs; assess patient's baseline for ADL function and identify physical deficits which impact ability to perform ADLs (bathing, care of mouth/teeth, toileting, grooming, dressing, etc )  - Assess/evaluate cause of self-care deficits   - Assess range of motion  - Assess patient's mobility; develop plan if impaired  - Assess patient's need for assistive devices and provide as appropriate  - Encourage maximum independence but intervene and supervise when necessary  ¯ Involve family in performance of ADLs  ¯ Assess for home care needs following discharge   ¯ Request OT consult to assist with ADL evaluation and planning for discharge  ¯ Provide patient education as appropriate  Outcome: Progressing  Goal: Maintain or return mobility status to optimal level  Description  INTERVENTIONS:  - Assess patient's baseline mobility status (ambulation, transfers, stairs, etc )    - Identify cognitive and physical deficits and behaviors that affect mobility  - Identify mobility aids required to assist with transfers and/or ambulation (gait belt, sit-to-stand, lift, walker, cane, etc )  - [place] fall precautions as indicated by assessment  - Record patient progress and toleration of activity level on Mobility SBAR; progress patient to next Phase/Stage  - Instruct patient to call for assistance with activity based on assessment  - Request Rehabilitation consult to assist with strengthening/weightbearing, etc   Outcome: Progressing     Problem: DISCHARGE PLANNING  Goal: Discharge to home or other facility with appropriate resources  Description  INTERVENTIONS:  - Identify barriers to discharge w/patient and caregiver  - Arrange for needed discharge resources and transportation as appropriate  - Identify discharge learning needs (meds, wound care, etc )  - Arrange for interpretive services to assist at discharge as needed  - Refer to Case Management Department for coordinating discharge planning if the patient needs post-hospital services based on physician/advanced practitioner order or complex needs related to functional status, cognitive ability, or social support system  Outcome: Progressing     Problem: Knowledge Deficit  Goal: Patient/family/caregiver demonstrates understanding of disease process, treatment plan, medications, and discharge instructions  Description  Complete learning assessment and assess knowledge base    Interventions:  - Provide teaching at level of understanding  - Provide teaching via preferred learning methods  Outcome: Progressing     Problem: NEUROSENSORY - ADULT  Goal: Achieves stable or improved neurological status  Description  INTERVENTIONS  - Monitor and report changes in neurological status  - Initiate measures to prevent increased intracranial pressure  - Maintain blood pressure and fluid volume within ordered parameters to optimize cerebral perfusion  - Monitor temperature, glucose, and sodium or any other associated labs   Initiate appropriate interventions as ordered  - Monitor for seizure activity   - Administer anti-seizure medications as ordered  Outcome: Progressing  Goal: Absence of seizures  Description  INTERVENTIONS  - Monitor for seizure activity  - Administer anti-seizure medications as ordered  - Monitor neurological status  Outcome: Progressing  Goal: Remains free of injury related to seizures activity  Description  INTERVENTIONS  - Maintain airway, patient safety  and administer oxygen as ordered  - Monitor patient for seizure activity, document and report duration and description of seizure to physician/advanced practitioner  - If seizure occurs,  ensure patient safety during seizure  - Reorient patient post seizure  - Seizure pads on all 4 side rails  - Instruct patient/family to notify RN of any seizure activity including if an aura is experienced  - Instruct patient/family to call for assistance with activity based on nursing assessment  - Administer anti-seizure medications as ordered  - Monitor fetal well being  Outcome: Progressing  Goal: Achieves maximal functionality and self care  Description  INTERVENTIONS  - Monitor swallowing and airway patency with patient fatigue and changes in neurological status  - Encourage and assist patient to increase activity and self care with guidance from rehab services  - Encourage visually impaired, hearing impaired and aphasic patients to use assistive/communication devices  Outcome: Progressing     Problem: Nutrition/Hydration-ADULT  Goal: Nutrient/Hydration intake appropriate for improving, restoring or maintaining nutritional needs  Description  Monitor and assess patient's nutrition/hydration status for malnutrition (ex- brittle hair, bruises, dry skin, pale skin and conjunctiva, muscle wasting, smooth red tongue, and disorientation)  Collaborate with interdisciplinary team and initiate plan and interventions as ordered  Monitor patient's weight and dietary intake as ordered or per policy  Utilize nutrition screening tool and intervene per policy  Determine patient's food preferences and provide high-protein, high-caloric foods as appropriate       INTERVENTIONS:  - Monitor oral intake, urinary output, labs, and treatment plans  - Assess nutrition and hydration status and recommend course of action  - Evaluate amount of meals eaten  - Assist patient with eating if necessary   - Allow adequate time for meals  - Recommend/ encourage appropriate diets, oral nutritional supplements, and vitamin/mineral supplements  - Order, calculate, and assess calorie counts as needed  - Recommend, monitor, and adjust tube feedings and TPN/PPN based on assessed needs  - Assess need for intravenous fluids  - Provide specific nutrition/hydration education as appropriate  - Include patient/family/caregiver in decisions related to nutrition  Outcome: Progressing

## 2019-06-29 ENCOUNTER — APPOINTMENT (INPATIENT)
Dept: NEUROLOGY | Facility: AMBULATORY SURGERY CENTER | Age: 39
DRG: 101 | End: 2019-06-29
Payer: MEDICARE

## 2019-06-29 LAB
ALBUMIN SERPL BCP-MCNC: 3.3 G/DL (ref 3.5–5)
ALP SERPL-CCNC: 160 U/L (ref 46–116)
ALT SERPL W P-5'-P-CCNC: 67 U/L (ref 12–78)
ANION GAP SERPL CALCULATED.3IONS-SCNC: 5 MMOL/L (ref 4–13)
AST SERPL W P-5'-P-CCNC: 70 U/L (ref 5–45)
BASOPHILS # BLD AUTO: 0.07 THOUSANDS/ΜL (ref 0–0.1)
BASOPHILS NFR BLD AUTO: 1 % (ref 0–1)
BILIRUB SERPL-MCNC: 0.35 MG/DL (ref 0.2–1)
BUN SERPL-MCNC: 10 MG/DL (ref 5–25)
CALCIUM SERPL-MCNC: 8.9 MG/DL (ref 8.3–10.1)
CHLORIDE SERPL-SCNC: 108 MMOL/L (ref 100–108)
CO2 SERPL-SCNC: 24 MMOL/L (ref 21–32)
CREAT SERPL-MCNC: 0.49 MG/DL (ref 0.6–1.3)
EOSINOPHIL # BLD AUTO: 0.16 THOUSAND/ΜL (ref 0–0.61)
EOSINOPHIL NFR BLD AUTO: 2 % (ref 0–6)
ERYTHROCYTE [DISTWIDTH] IN BLOOD BY AUTOMATED COUNT: 12.3 % (ref 11.6–15.1)
GFR SERPL CREATININE-BSD FRML MDRD: 124 ML/MIN/1.73SQ M
GLUCOSE SERPL-MCNC: 127 MG/DL (ref 65–140)
HCT VFR BLD AUTO: 38.4 % (ref 34.8–46.1)
HGB BLD-MCNC: 12.3 G/DL (ref 11.5–15.4)
IMM GRANULOCYTES # BLD AUTO: 0.01 THOUSAND/UL (ref 0–0.2)
IMM GRANULOCYTES NFR BLD AUTO: 0 % (ref 0–2)
LEVETIRACETAM SERPL-MCNC: 26.4 UG/ML (ref 10–40)
LYMPHOCYTES # BLD AUTO: 2.6 THOUSANDS/ΜL (ref 0.6–4.47)
LYMPHOCYTES NFR BLD AUTO: 33 % (ref 14–44)
MAGNESIUM SERPL-MCNC: 2.2 MG/DL (ref 1.6–2.6)
MCH RBC QN AUTO: 31.9 PG (ref 26.8–34.3)
MCHC RBC AUTO-ENTMCNC: 32 G/DL (ref 31.4–37.4)
MCV RBC AUTO: 100 FL (ref 82–98)
MONOCYTES # BLD AUTO: 0.92 THOUSAND/ΜL (ref 0.17–1.22)
MONOCYTES NFR BLD AUTO: 12 % (ref 4–12)
NEUTROPHILS # BLD AUTO: 4.08 THOUSANDS/ΜL (ref 1.85–7.62)
NEUTS SEG NFR BLD AUTO: 52 % (ref 43–75)
NRBC BLD AUTO-RTO: 0 /100 WBCS
PHOSPHATE SERPL-MCNC: 3.4 MG/DL (ref 2.7–4.5)
PLATELET # BLD AUTO: 219 THOUSANDS/UL (ref 149–390)
PMV BLD AUTO: 11.8 FL (ref 8.9–12.7)
POTASSIUM SERPL-SCNC: 4 MMOL/L (ref 3.5–5.3)
PROT SERPL-MCNC: 7.2 G/DL (ref 6.4–8.2)
RBC # BLD AUTO: 3.85 MILLION/UL (ref 3.81–5.12)
SODIUM SERPL-SCNC: 137 MMOL/L (ref 136–145)
TOPIRAMATE SERPL-MCNC: 4.3 UG/ML (ref 2–25)
WBC # BLD AUTO: 7.84 THOUSAND/UL (ref 4.31–10.16)

## 2019-06-29 PROCEDURE — 84100 ASSAY OF PHOSPHORUS: CPT | Performed by: NURSE PRACTITIONER

## 2019-06-29 PROCEDURE — 95951 PR EEG MONITORING/VIDEORECORD: CPT | Performed by: PSYCHIATRY & NEUROLOGY

## 2019-06-29 PROCEDURE — 99232 SBSQ HOSP IP/OBS MODERATE 35: CPT | Performed by: INTERNAL MEDICINE

## 2019-06-29 PROCEDURE — 85025 COMPLETE CBC W/AUTO DIFF WBC: CPT | Performed by: NURSE PRACTITIONER

## 2019-06-29 PROCEDURE — 83735 ASSAY OF MAGNESIUM: CPT | Performed by: NURSE PRACTITIONER

## 2019-06-29 PROCEDURE — 99232 SBSQ HOSP IP/OBS MODERATE 35: CPT | Performed by: PSYCHIATRY & NEUROLOGY

## 2019-06-29 PROCEDURE — 80339 ANTIEPILEPTICS NOS 1-3: CPT | Performed by: INTERNAL MEDICINE

## 2019-06-29 PROCEDURE — 80053 COMPREHEN METABOLIC PANEL: CPT | Performed by: NURSE PRACTITIONER

## 2019-06-29 PROCEDURE — 95951 HB EEG MONITORING/VIDEORECORD: CPT

## 2019-06-29 RX ORDER — LEVETIRACETAM 100 MG/ML
1000 SOLUTION ORAL EVERY 8 HOURS
Status: DISCONTINUED | OUTPATIENT
Start: 2019-06-29 | End: 2019-07-03 | Stop reason: HOSPADM

## 2019-06-29 RX ADMIN — RUFINAMIDE 1600 MG: 400 TABLET, FILM COATED ORAL at 19:49

## 2019-06-29 RX ADMIN — TOPIRAMATE 250 MG: 100 TABLET, FILM COATED ORAL at 08:47

## 2019-06-29 RX ADMIN — LEVETIRACETAM 1000 MG: 100 SOLUTION ORAL at 06:04

## 2019-06-29 RX ADMIN — RUFINAMIDE 1600 MG: 400 TABLET, FILM COATED ORAL at 08:48

## 2019-06-29 RX ADMIN — TOPIRAMATE 250 MG: 100 TABLET, FILM COATED ORAL at 21:56

## 2019-06-29 RX ADMIN — LEVETIRACETAM 1000 MG: 100 SOLUTION ORAL at 21:59

## 2019-06-29 RX ADMIN — MELATONIN 6 MG: 3 TAB ORAL at 21:56

## 2019-06-29 RX ADMIN — MULTIVITAMIN 5 ML: LIQUID ORAL at 08:47

## 2019-06-29 RX ADMIN — LEVETIRACETAM 1000 MG: 100 SOLUTION ORAL at 16:32

## 2019-06-29 RX ADMIN — LACTOBACILLUS ACIDOPHILUS / LACTOBACILLUS BULGARICUS 1 PACKET: 100 MILLION CFU STRENGTH GRANULES at 16:31

## 2019-06-29 RX ADMIN — PERAMPANEL 4 MG: 0.5 SUSPENSION ORAL at 21:56

## 2019-06-29 RX ADMIN — ENOXAPARIN SODIUM 30 MG: 30 INJECTION SUBCUTANEOUS at 14:06

## 2019-06-29 RX ADMIN — LACTOBACILLUS ACIDOPHILUS / LACTOBACILLUS BULGARICUS 1 PACKET: 100 MILLION CFU STRENGTH GRANULES at 11:55

## 2019-06-29 RX ADMIN — LACTOBACILLUS ACIDOPHILUS / LACTOBACILLUS BULGARICUS 1 PACKET: 100 MILLION CFU STRENGTH GRANULES at 08:48

## 2019-06-29 NOTE — ASSESSMENT & PLAN NOTE
Consulted dietary  · Continue tube feeds, free water flushes  · Bladder scan Q shift  · Elevate head of bed, aspiration precuations

## 2019-06-29 NOTE — PLAN OF CARE
Problem: Potential for Falls  Goal: Patient will remain free of falls  Description  INTERVENTIONS:  - Assess patient frequently for physical needs  -  Identify cognitive and physical deficits and behaviors that affect risk of falls    -  [place] fall precautions as indicated by assessment   - Educate patient/family on patient safety including physical limitations  - Instruct patient to call for assistance with activity based on assessment  - Modify environment to reduce risk of injury  - Consider OT/PT consult to assist with strengthening/mobility  Outcome: Progressing     Problem: Prexisting or High Potential for Compromised Skin Integrity  Goal: Skin integrity is maintained or improved  Description  INTERVENTIONS:  - Identify patients at risk for skin breakdown  - Assess and monitor skin integrity  - Assess and monitor nutrition and hydration status  - Monitor labs (i e  albumin)  - Assess for incontinence   - Turn and reposition patient  - Assist with mobility/ambulation  - Relieve pressure over bony prominences  - Avoid friction and shearing  - Provide appropriate hygiene as needed including keeping skin clean and dry  - Evaluate need for skin moisturizer/barrier cream  - Collaborate with interdisciplinary team (i e  Nutrition, Rehabilitation, etc )   - Patient/family teaching  Outcome: Progressing     Problem: SAFETY ADULT  Goal: Maintain or return to baseline ADL function  Description  INTERVENTIONS:  -  Assess patient's ability to carry out ADLs; assess patient's baseline for ADL function and identify physical deficits which impact ability to perform ADLs (bathing, care of mouth/teeth, toileting, grooming, dressing, etc )  - Assess/evaluate cause of self-care deficits   - Assess range of motion  - Assess patient's mobility; develop plan if impaired  - Assess patient's need for assistive devices and provide as appropriate  - Encourage maximum independence but intervene and supervise when necessary  ¯ Involve family in performance of ADLs  ¯ Assess for home care needs following discharge   ¯ Request OT consult to assist with ADL evaluation and planning for discharge  ¯ Provide patient education as appropriate  Outcome: Progressing  Goal: Maintain or return mobility status to optimal level  Description  INTERVENTIONS:  - Assess patient's baseline mobility status (ambulation, transfers, stairs, etc )    - Identify cognitive and physical deficits and behaviors that affect mobility  - Identify mobility aids required to assist with transfers and/or ambulation (gait belt, sit-to-stand, lift, walker, cane, etc )  - [place] fall precautions as indicated by assessment  - Record patient progress and toleration of activity level on Mobility SBAR; progress patient to next Phase/Stage  - Instruct patient to call for assistance with activity based on assessment  - Request Rehabilitation consult to assist with strengthening/weightbearing, etc   Outcome: Progressing     Problem: DISCHARGE PLANNING  Goal: Discharge to home or other facility with appropriate resources  Description  INTERVENTIONS:  - Identify barriers to discharge w/patient and caregiver  - Arrange for needed discharge resources and transportation as appropriate  - Identify discharge learning needs (meds, wound care, etc )  - Arrange for interpretive services to assist at discharge as needed  - Refer to Case Management Department for coordinating discharge planning if the patient needs post-hospital services based on physician/advanced practitioner order or complex needs related to functional status, cognitive ability, or social support system  Outcome: Progressing     Problem: Knowledge Deficit  Goal: Patient/family/caregiver demonstrates understanding of disease process, treatment plan, medications, and discharge instructions  Description  Complete learning assessment and assess knowledge base    Interventions:  - Provide teaching at level of understanding  - Provide teaching via preferred learning methods  Outcome: Progressing     Problem: NEUROSENSORY - ADULT  Goal: Achieves stable or improved neurological status  Description  INTERVENTIONS  - Monitor and report changes in neurological status  - Initiate measures to prevent increased intracranial pressure  - Maintain blood pressure and fluid volume within ordered parameters to optimize cerebral perfusion  - Monitor temperature, glucose, and sodium or any other associated labs   Initiate appropriate interventions as ordered  - Monitor for seizure activity   - Administer anti-seizure medications as ordered  Outcome: Progressing  Goal: Absence of seizures  Description  INTERVENTIONS  - Monitor for seizure activity  - Administer anti-seizure medications as ordered  - Monitor neurological status  Outcome: Progressing  Goal: Remains free of injury related to seizures activity  Description  INTERVENTIONS  - Maintain airway, patient safety  and administer oxygen as ordered  - Monitor patient for seizure activity, document and report duration and description of seizure to physician/advanced practitioner  - If seizure occurs,  ensure patient safety during seizure  - Reorient patient post seizure  - Seizure pads on all 4 side rails  - Instruct patient/family to notify RN of any seizure activity including if an aura is experienced  - Instruct patient/family to call for assistance with activity based on nursing assessment  - Administer anti-seizure medications as ordered  - Monitor fetal well being  Outcome: Progressing  Goal: Achieves maximal functionality and self care  Description  INTERVENTIONS  - Monitor swallowing and airway patency with patient fatigue and changes in neurological status  - Encourage and assist patient to increase activity and self care with guidance from rehab services  - Encourage visually impaired, hearing impaired and aphasic patients to use assistive/communication devices  Outcome: Progressing     Problem: Nutrition/Hydration-ADULT  Goal: Nutrient/Hydration intake appropriate for improving, restoring or maintaining nutritional needs  Description  Monitor and assess patient's nutrition/hydration status for malnutrition (ex- brittle hair, bruises, dry skin, pale skin and conjunctiva, muscle wasting, smooth red tongue, and disorientation)  Collaborate with interdisciplinary team and initiate plan and interventions as ordered  Monitor patient's weight and dietary intake as ordered or per policy  Utilize nutrition screening tool and intervene per policy  Determine patient's food preferences and provide high-protein, high-caloric foods as appropriate       INTERVENTIONS:  - Monitor oral intake, urinary output, labs, and treatment plans  - Assess nutrition and hydration status and recommend course of action  - Evaluate amount of meals eaten  - Assist patient with eating if necessary   - Allow adequate time for meals  - Recommend/ encourage appropriate diets, oral nutritional supplements, and vitamin/mineral supplements  - Order, calculate, and assess calorie counts as needed  - Recommend, monitor, and adjust tube feedings and TPN/PPN based on assessed needs  - Assess need for intravenous fluids  - Provide specific nutrition/hydration education as appropriate  - Include patient/family/caregiver in decisions related to nutrition  Outcome: Progressing

## 2019-06-29 NOTE — ASSESSMENT & PLAN NOTE
Breakthrough seizure events secondary likely due to recent decrease in antiepileptics as Depakote was recently weaned off due to over-sedation vs chronic baseline   Seizure was terminated with Versed given in the ambulance  · Neurology following, appreciate input  · AED regimen prior to admission: Levetiracetam 100 mg/mL 10 mL BID, Topiramate 250 mg BID, Fycompa 0 5 mg/mL 16 mL HS, Banzel 400 mg 4 tabs BID, Epidiolex 100 mg/mL 2 5 mL BID, Cannabidiol daily  · Per neurology, increased evening Epidiolex dose to 100 mg/mL 5 mL and continue 2 5 mL in the morning, increase Cannabidiol to BID dosing, and change VNS settings to conserve battery (place 4 years ago)  · 24 hour EEG monitoring revealed 28 seizures lasting 10-30 seconds each over night   · Supportive care

## 2019-06-29 NOTE — PROGRESS NOTES
Progress Note - Kathia Singh 1980, 46 y o  female MRN: [id number]    Unit/Bed#: [facility] 935-00 Encounter: [id number]    Primary Care Provider: Belén Cortes MD   Date and time admitted to hospital: 6/27/2019  6:00 PM        S/P placement of VNS (vagus nerve stimulation) device  Assessment & Plan  Neurology changed patient's VNS settings to conserve battery     Transaminitis  Assessment & Plan  Mild  AST 46, ALT 58  Likely secondary to antiepileptic use, Depakote has been weaned off   · Monitor CMP periodically    Hyperammonemia (HCC)  Assessment & Plan  Chronic problem, likely secondary to antiepileptics  Depakote has been weaned off and ammonia level improved   Ammonia wnl at 26  · Discontinue lactulose as Depakote has been weaned off   Discussed with neurology     Intellectual disability  Assessment & Plan  Supportive care  One-to-one observation  Turn q 2 hours    Underweight  Assessment & Plan  Consulted dietary  · Continue tube feeds, free water flushes  · Bladder scan Q shift  · Elevate head of bed, aspiration precuations     * Lennox-Gastaut syndrome with tonic seizures (HCC)  Assessment & Plan  Breakthrough seizure events secondary likely due to recent decrease in antiepileptics as Depakote was recently weaned off due to over-sedation vs chronic baseline   Seizure was terminated with Versed given in the ambulance  · Neurology following, appreciate input  · AED regimen prior to admission: Levetiracetam 100 mg/mL 10 mL BID, Topiramate 250 mg BID, Fycompa 0 5 mg/mL 16 mL HS, Banzel 400 mg 4 tabs BID, Epidiolex 100 mg/mL 2 5 mL BID, Cannabidiol daily  · Per neurology, increased evening Epidiolex dose to 100 mg/mL 5 mL and continue 2 5 mL in the morning, increase Cannabidiol to BID dosing, and change VNS settings to conserve battery (place 4 years ago)  · 24 hour EEG monitoring revealed 28 seizures lasting 10-30 seconds each over night   · Supportive care        VTE Pharmacologic Prophylaxis: Pharmacologic: Enoxaparin (Lovenox)  Mechanical VTE Prophylaxis in Place: Yes    Patient Centered Rounds: I have performed bedside rounds with nursing staff today  Discussions with Specialists or Other Care Team Provider:  Neurology    Education and Discussions with Family / Patient: called and updated father Izzy Galvez over phone    Time Spent for Care: 30 minutes  More than 50% of total time spent on counseling and coordination of care as described above  Current Length of Stay: 2 day(s)    Current Patient Status: Inpatient   Certification Statement: The patient will continue to require additional inpatient hospital stay due to as mentioned    Discharge Plan:  Awaiting clinical and symptomatic improvement, patient presently in restraints awaiting patient to be off restraints    Code Status: Level 1 - Full Code      Subjective:     Comfortably in bed  History chart labs medications reviewed    Objective:     Vitals:   Temp (24hrs), Av 7 °F (37 1 °C), Min:98 6 °F (37 °C), Max:98 8 °F (37 1 °C)    Temp:  [98 6 °F (37 °C)-98 8 °F (37 1 °C)] 98 8 °F (37 1 °C)  HR:  [] 100  Resp:  [16] 16  BP: (100-103)/(67-69) 100/69  SpO2:  [97 %-100 %] 100 %  Body mass index is 20 42 kg/m²  Input and Output Summary (last 24 hours):        Intake/Output Summary (Last 24 hours) at 2019 1215  Last data filed at 2019 1135  Gross per 24 hour   Intake 540 ml   Output 772 ml   Net -232 ml       Physical Exam:     Physical Exam    Comfortably in bed  Neck supple  Lungs diminished breath sounds bilateral  Heart sounds S1 and S2 noted  Abdomen soft  Awake  Pulses noted  No pedal edema  No rash    Additional Data:     Labs:    Results from last 7 days   Lab Units 19  0657   WBC Thousand/uL 7 84   HEMOGLOBIN g/dL 12 3   HEMATOCRIT % 38 4   PLATELETS Thousands/uL 219   NEUTROS PCT % 52   LYMPHS PCT % 33   MONOS PCT % 12   EOS PCT % 2     Results from last 7 days   Lab Units 19  0657   SODIUM mmol/L 137 POTASSIUM mmol/L 4 0   CHLORIDE mmol/L 108   CO2 mmol/L 24   BUN mg/dL 10   CREATININE mg/dL 0 49*   ANION GAP mmol/L 5   CALCIUM mg/dL 8 9   ALBUMIN g/dL 3 3*   TOTAL BILIRUBIN mg/dL 0 35   ALK PHOS U/L 160*   ALT U/L 67   AST U/L 70*   GLUCOSE RANDOM mg/dL 127     Results from last 7 days   Lab Units 06/27/19  0905   INR  1 10     Results from last 7 days   Lab Units 06/27/19  1512   POC GLUCOSE mg/dl 85                   * I Have Reviewed All Lab Data Listed Above  * Additional Pertinent Lab Tests Reviewed: All Labs Within Last 24 Hours Reviewed    Imaging:    Imaging Reports Reviewed Today Include:  Imaging studies noted  Imaging Personally Reviewed by Myself Includes:     Recent Cultures (last 7 days):           Last 24 Hours Medication List:     Current Facility-Administered Medications:  acetaminophen 650 mg Per G Tube Q6H PRN Mor Merino, MD   bisacodyl 10 mg Rectal HS PRN Mor Merino, MD   Cannabidiol 500 mg Per G Tube BID Corinna Wilkinson MD   lactobacillus acidophilus-bulgaricus 1 packet Per PEG Tube TID With Meals Mor Merino, MD   levETIRAcetam 1,000 mg Per G Tube Q8H Corinna Wilkinson MD   LORazepam 2 mg Intravenous Q5 Min PRN Mor File, MD   melatonin 6 mg Per G Tube HS Mor Merino MD   multivitamin with iron-minerals 5 mL Per G Tube Daily Mor Merino, MD   ondansetron 4 mg Intravenous Q6H PRN Mor Merino, MD   perampanel 6 mg Per OG Tube HS Corinna Wilkinson MD   rufinamide 1,600 mg Per G Tube BID Mor Merino, MD   topiramate 250 mg Per G Tube Q12H [facility] Mor Merino MD        Today, Patient Was Seen By: Jyoti Thao MD    ** Please Note: Dictation voice to text software may have been used in the creation of this document   **

## 2019-06-29 NOTE — PROGRESS NOTES
Patient agitated, yelling out  Attempting to scratch and kick staff  Pulling at electrodes, and wires  Notified SLIM  Wrist restraints applied

## 2019-06-29 NOTE — PROGRESS NOTES
Neurology/Epilepsy Progress Note  Patient Name:  Pam Rey  : 1980  MRN: [id number]  Encounter: [id number]  [place]: [place] [address] [address]-  Date of admission: 2019  Date: 19    CC: there was a significant increase in seizures yesterday  Interval History:   She was relatively stable until about 4PM yesterday, when there was a dramatic increase in the number of recurrent tonic seizures, these seizures lasted 10-30 seconds at a time with mild clonic activity at the end but recurred every 2-5 minutes  She was given a dose of lorazepam without immediate effect in reducing the frequency of her seizures  She was given an extra dose of levetiracetam, which may have helped to reduce the duration and severity of her seizures  Due to this response her levetiracetam dose was increased from twice a day to three times a day  It seemed that Epidioloex was helpful in the past to control her seizures and allow her to wean off of Divalproex, but due to increase seizure activity the dose was doubled (based on maximal dose of 20mg/kg per day)  Due to the risk of worsening agitation with high doses of levetiracetam and topiramate, perampanel dose was decreased  It is unclear how beneficial perampanel has been for Ms Bull  She was cursing and agitated overnight that she was placed in a posey  She has been off of restraints all morning  She is calmer      Current AEDs:  Epidiolex (Cannabidiol) 100mg/mL 5mL twice a day  levetiracetam 1000mg every 8 hours  perampanel 6mg at bedtime  rufinamide 1600mg twice a day  topiramate 250mg twice a day    Additional Past Medical/Surgical History:  No additional medical history is provided by the patient and is unchanged from the initial H+P    PFSHx:  [x] unable to obtain due to cognitive impairment    Current Medications:    Current Facility-Administered Medications:  acetaminophen 650 mg Per G Tube Q6H PRN Sonia Joyner MD   bisacodyl 10 mg Rectal HS PRN Christa Villalta Mindy Cooks, MD   Cannabidiol 500 mg Per G Tube BID Terrilyn Curling, MD   enoxaparin 30 mg Subcutaneous Q24H [address] Mily Jolly MD   lactobacillus acidophilus-bulgaricus 1 packet Per PEG Tube TID With Meals Umang Alexander MD   levETIRAcetam 1,000 mg Per G Tube Q8H Terrilyn Curling, MD   LORazepam 2 mg Intravenous Q5 Min PRN Umang Alexander MD   melatonin 6 mg Per G Tube HS Umang Alexander MD   multivitamin with iron-minerals 5 mL Per G Tube Daily Umang Alexander MD   ondansetron 4 mg Intravenous Q6H PRN Umang Alexander MD   perampanel 6 mg Per OG Tube HS Terrilyn Curling, MD   rufinamide 1,600 mg Per G Tube BID Umang Alexander MD   topiramate 250 mg Per G Tube Q12H [address] Umang Alexander MD       PRN medications:     acetaminophen    bisacodyl    LORazepam    ondansetron    Allergies: Allergies   Allergen Reactions    Felbamate        Review of Systems  ROS:  (Extended=2-9; brief = 1)    Review of systems not obtained due to patient factors cognitive impairment    Physical Exam:  EXAMINATION   (any 12=Level 3; any 6=Level 2; <6 = Level 1)  Elaborate all abnormal findings  General exam   Blood pressure 100/69, pulse 100, temperature 98 8 °F (37 1 °C), resp  rate 16, height 5' 3" (1 6 m), SpO2 100 %     Appearance: awake, nonverbal, limited attention span  Cardiovascular: unable to hear due to positioning of patient, she is unable to comply with repositioning  Pulmonary: unable to hear due to positioning of patient, she is unable to comply with repositioning  Abdominal: soft, nontender, nondistended  Extremities: no edema    HEENT: anicteric and moist mucus membranes / oral cavity   Fundoscopy: not assessed    Mental status  Orientation: awake, nonverbal  Fund of Knowledge: poor   Attention and Concentration: poor attention  Current and Remote Memory:not assessed  Language: noncommunicative but she can curse; she is calmer today    Cranial Nerves  CN 2: not assessed   CN 3, 4, 6: not assessed  CN 5:not assessed  CN 7: muscles of facial expression are symmetric  CN 8:not assessed  CN 9, 10:not assessed  CN 11:not assessed  CN 12:not assessed    Motor:  Bulk, Tone: hypotonic, thin muscle bulk  Pronation: unable to test due to patient's condition  Strength: she withdrawals limbs symmetrically        [] I personally reviewed the labs/radiological images/ EEG tracing  Labs:  Component      Latest Ref Rng & Units 6/27/2019 6/29/2019   WBC      4 31 - 10 16 Thousand/uL 6 20 7 84   Red Blood Cell Count      3 81 - 5 12 Million/uL 4 23 3 85   Hemoglobin      11 5 - 15 4 g/dL 13 9 12 3   HCT      34 8 - 46 1 % 43 3 38 4   Platelet Count      465 - 390 Thousands/uL 214 219   Sodium      136 - 145 mmol/L 140 137   Potassium      3 5 - 5 3 mmol/L 3 8 4 0   Chloride      100 - 108 mmol/L 108 108   CO2      21 - 32 mmol/L 21 24   Anion Gap      4 - 13 mmol/L 11 5   BUN      5 - 25 mg/dL 12 10   Creatinine      0 60 - 1 30 mg/dL 0 56 (L) 0 49 (L)   Glucose, Random      65 - 140 mg/dL 160 (H) 127   Calcium      8 3 - 10 1 mg/dL 9 0 8 9   AST      5 - 45 U/L 46 (H) 70 (H)   ALT      12 - 78 U/L 58 67   Alkaline Phosphatase      46 - 116 U/L 185 (H) 160 (H)   Total Protein      6 4 - 8 2 g/dL 7 6 7 2   Albumin      3 5 - 5 0 g/dL 3 4 (L) 3 3 (L)   TOTAL BILIRUBIN      0 20 - 1 00 mg/dL 0 30 0 35   eGFR      ml/min/1 73sq m 119 124   Ammonia      11 - 35 umol/L 26    Magnesium      1 6 - 2 6 mg/dL  2 2   Phosphorus      2 7 - 4 5 mg/dL  3 4     Radiology:  3/20/2019 MRI Brain  Normal MR of the brain    Continuous video EEG 6/28-6/29/2019 reviewed to 8AM:   There is exccessive diffuse beta with intermittent diffuse sharply contoured 4-5 Hz theta activity during the waking background  There are frequent generalized spike-slow wave or polyspike complexes  There are also bilateral temporal moderate-high voltage sharp waves maximal over T3 and T4       From 14:45 to 15:00, there were a couple of generalized tonic seizures associated with moderate to high voltage paroxysmal fast activity, then rhythmic 1 Hz spike slow waves  Then there is a prolonged run of rhythmic sharp 5 Hz generalized theta activity for a minute  These are associated with body tension, arms brought up, flexed, and rigid, then brief clonic jerks; after the seizure stops she is very restless  Then from 16:00 to 17:00, there was a dramatic increase in tonic seizures, recurring every two to five minutes  Again these seizures start with generalized paroxysmal fast activity, then rhythmic 1 Hz spike slow waves; total tonic seizure last 10-30 seconds, followed by a period of rhythmic generalized 5 Hz sharp theta activity for 1-3 minutes but no clinical activity  She was given lorazepam 2mg IV at 17:11  Between 17:00 and 18:00, there were 15 tonic seizures (seizures were still recurring every 2-5 minutes)  However, it seems that the period of GPFA has decreased  From 18:00 to 18:30, there were 9 tonic seizures  She was given extra Levetiracetam 1g at 18:45  From 18:30 to 19:00, the seizures are getting shorter in duration, about 7 in this 30 minutes period, but duration of GPFA has decreased to less than 8 seconds  After 19:30, there are no clinical seizures; GPFA's are shorter in duration, more like bursts of generalized polyspikes  The background is more consistent with slow wave sleep  Patient eventually woke up around 22:30  After this period, seizures clustered around midnight and 5AM, more so when she is sleeping (23:38, 00:06, 00:22, 00:37, 00:48, 04:58, 05:14, 05:24, 05:31)  Assessment:  Ms Isidro Mena is a 46 y o  woman with intractable Lennox Gastaut Epilepsy Syndrome with extremely frequent tonic seizures  Her seizures are generally associated with sleep, tonic seizures that may recur in cluster and likely daily seizures  Her seizures can be easily missed and result in under reporting of seizures  She is intractable and on 5 AEDs    Complicated by medication side effects (benzodiazepine/barbiturate caused excessive somnolence; valproate caused hyperammonia and elevated LFTs)  She had a severe cluster of seizures yesterday that was more frequent that prior EEG studies  Prior EEG studies showed any where from zero to ten seizures per hour  It seems that the additional dose of levetiracetam was helpful  She was extremely agitated overnight, could be due to underlying neurocognitive disorder worsened by current medications  It is unclear if perampanel has been useful in controlling her seizure, will continue to reduce the dose to minimize side effects, potentially wean off  However, perampanel has an extremely long half-life so may take weeks to completely be out of her system  Recommendations:  Continue with continuous video EEG monitoring with single lead ECG for another day to count the frequency of her seizures with current medications  - Epidiolex (Cannabidiol) 100mg/mL 5mL twice a day (may worsen agitation but monitor for diarrhea and abnormal liver enzymes)  -  Levetiracetam 1000mg every 8 hours  - Decrease perampanel to 4mg at bedtime  - Rufinamide 1600mg twice a day  - Topiramate 250mg twice a day  - discontinued levocarnitine and lactulose since patient has been off of Valproic acid  - appreciate nutritionist recommendation regarding lower glycemic index tube feed  [x] Decision making was of high-complexity due to the patient's high risk condition (seizures), psychiatric and neuropsychological comorbidities, behavioral problems, memory and cognitive problems and medication side effects        [] Counseling and/or Coordination of Care: (time_____  Start:          End:          )   (>50% of Total Floor Time: Spent with Patient/Family)  Points of discussion:  [] Diagnosis    [] AED adverse effects    [] Compliance   [] Driving   [] Seizure safety    [] Surgery    [] VNS  [] Contraception       [] Pregnancy   [] Hormones                    [] Bone health

## 2019-06-30 ENCOUNTER — APPOINTMENT (INPATIENT)
Dept: NEUROLOGY | Facility: AMBULATORY SURGERY CENTER | Age: 39
DRG: 101 | End: 2019-06-30
Payer: MEDICARE

## 2019-06-30 LAB
ALBUMIN SERPL BCP-MCNC: 3.3 G/DL (ref 3.5–5)
ALP SERPL-CCNC: 165 U/L (ref 46–116)
ALT SERPL W P-5'-P-CCNC: 75 U/L (ref 12–78)
AMMONIA PLAS-SCNC: 58 UMOL/L (ref 11–35)
AST SERPL W P-5'-P-CCNC: 75 U/L (ref 5–45)
BILIRUB DIRECT SERPL-MCNC: 0.09 MG/DL (ref 0–0.2)
BILIRUB SERPL-MCNC: 0.4 MG/DL (ref 0.2–1)
PROT SERPL-MCNC: 7.3 G/DL (ref 6.4–8.2)

## 2019-06-30 PROCEDURE — 82140 ASSAY OF AMMONIA: CPT | Performed by: PSYCHIATRY & NEUROLOGY

## 2019-06-30 PROCEDURE — 99232 SBSQ HOSP IP/OBS MODERATE 35: CPT | Performed by: PSYCHIATRY & NEUROLOGY

## 2019-06-30 PROCEDURE — 80076 HEPATIC FUNCTION PANEL: CPT | Performed by: PSYCHIATRY & NEUROLOGY

## 2019-06-30 PROCEDURE — 99232 SBSQ HOSP IP/OBS MODERATE 35: CPT | Performed by: INTERNAL MEDICINE

## 2019-06-30 PROCEDURE — 95951 PR EEG MONITORING/VIDEORECORD: CPT | Performed by: PSYCHIATRY & NEUROLOGY

## 2019-06-30 PROCEDURE — 95951 HB EEG MONITORING/VIDEORECORD: CPT

## 2019-06-30 RX ORDER — CLOBAZAM 10 MG/1
5 TABLET ORAL EVERY 12 HOURS
Status: DISCONTINUED | OUTPATIENT
Start: 2019-06-30 | End: 2019-07-03 | Stop reason: HOSPADM

## 2019-06-30 RX ADMIN — MELATONIN 6 MG: 3 TAB ORAL at 21:41

## 2019-06-30 RX ADMIN — LEVETIRACETAM 1000 MG: 100 SOLUTION ORAL at 05:42

## 2019-06-30 RX ADMIN — LACTOBACILLUS ACIDOPHILUS / LACTOBACILLUS BULGARICUS 1 PACKET: 100 MILLION CFU STRENGTH GRANULES at 11:48

## 2019-06-30 RX ADMIN — LEVETIRACETAM 1000 MG: 100 SOLUTION ORAL at 13:47

## 2019-06-30 RX ADMIN — LACTOBACILLUS ACIDOPHILUS / LACTOBACILLUS BULGARICUS 1 PACKET: 100 MILLION CFU STRENGTH GRANULES at 08:47

## 2019-06-30 RX ADMIN — TOPIRAMATE 250 MG: 100 TABLET, FILM COATED ORAL at 21:41

## 2019-06-30 RX ADMIN — LACTOBACILLUS ACIDOPHILUS / LACTOBACILLUS BULGARICUS 1 PACKET: 100 MILLION CFU STRENGTH GRANULES at 15:55

## 2019-06-30 RX ADMIN — VALPROATE SODIUM 250 MG: 100 INJECTION, SOLUTION INTRAVENOUS at 06:29

## 2019-06-30 RX ADMIN — VALPROATE SODIUM 250 MG: 100 INJECTION, SOLUTION INTRAVENOUS at 03:30

## 2019-06-30 RX ADMIN — TOPIRAMATE 250 MG: 100 TABLET, FILM COATED ORAL at 08:48

## 2019-06-30 RX ADMIN — ENOXAPARIN SODIUM 30 MG: 30 INJECTION SUBCUTANEOUS at 08:47

## 2019-06-30 RX ADMIN — LEVETIRACETAM 1000 MG: 100 SOLUTION ORAL at 21:42

## 2019-06-30 RX ADMIN — PERAMPANEL 2 MG: 0.5 SUSPENSION ORAL at 21:39

## 2019-06-30 RX ADMIN — CLOBAZAM 5 MG: 10 TABLET ORAL at 21:40

## 2019-06-30 RX ADMIN — MULTIVITAMIN 5 ML: LIQUID ORAL at 08:46

## 2019-06-30 RX ADMIN — CLOBAZAM 5 MG: 10 TABLET ORAL at 10:16

## 2019-06-30 RX ADMIN — LORAZEPAM 2 MG: 2 INJECTION INTRAMUSCULAR; INTRAVENOUS at 07:42

## 2019-06-30 NOTE — PROGRESS NOTES
Progress Note - Brittany Guerra 1980, 46 y o  female MRN: [id number]    Unit/Bed#: [facility] 616-37 Encounter: [id number]    Primary Care Provider: Dante Awan MD   Date and time admitted to hospital: 2019  6:00 PM        * Lennox-Gastaut syndrome with tonic seizures ([address] )  Assessment & Plan  · Ongoing seizures  · Discussed with Neurology  · Continue antiepileptic regimen per Neurology    S/P placement of VNS (vagus nerve stimulation) device  Assessment & Plan  Neurology following     Transaminitis  Assessment & Plan  · Monitor    Hyperammonemia (HCC)  Assessment & Plan  · Monitor levels    Intellectual disability  Assessment & Plan  Supportive care  One-to-one observation  Turn q 2 hours    Underweight  Assessment & Plan  Consulted dietary  · Continue tube feeds, free water flushes  · Bladder scan Q shift  · Elevate head of bed, aspiration precuations       VTE Pharmacologic Prophylaxis:   Pharmacologic: Enoxaparin (Lovenox)  Mechanical VTE Prophylaxis in Place: Yes    Patient Centered Rounds: I have performed bedside rounds with nursing staff today  Discussions with Specialists or Other Care Team Provider:  Discussed with Neurology in detail    Education and Discussions with Family / Patient:  Called and updated father over phone    Time Spent for Care: 30 minutes  More than 50% of total time spent on counseling and coordination of care as described above      Current Length of Stay: 3 day(s)    Current Patient Status: Inpatient   Certification Statement: The patient will continue to require additional inpatient hospital stay due to as mentioned    Discharge Plan:  Awaiting clinical and symptomatic improvement    Code Status: Level 1 - Full Code      Subjective:     Comfortably in bed  Discussed with Neurology patient noted to have seizures yesterday needing medication titration and close monitoring    Objective:     Vitals:   Temp (24hrs), Av 3 °F (36 8 °C), Min:98 2 °F (36 8 °C), Max:98 4 °F (36 9 °C)    Temp:  [98 2 °F (36 8 °C)-98 4 °F (36 9 °C)] 98 2 °F (36 8 °C)  HR:  [83-93] 91  Resp:  [16-18] 16  BP: ()/(56-75) 99/66  SpO2:  [95 %-99 %] 95 %  Body mass index is 20 42 kg/m²  Input and Output Summary (last 24 hours): Intake/Output Summary (Last 24 hours) at 6/30/2019 1201  Last data filed at 6/30/2019 1100  Gross per 24 hour   Intake 50 ml   Output 473 ml   Net -423 ml       Physical Exam:     Physical Exam    Comfortably in bed  Neck supple  Lungs diminished breath sounds bilateral  Heart sounds S1-S2 noted  Abdomen soft  Patient drowsy comfortably in bed  Pulses noted  No rash    Additional Data:     Labs:    Results from last 7 days   Lab Units 06/29/19  0657   WBC Thousand/uL 7 84   HEMOGLOBIN g/dL 12 3   HEMATOCRIT % 38 4   PLATELETS Thousands/uL 219   NEUTROS PCT % 52   LYMPHS PCT % 33   MONOS PCT % 12   EOS PCT % 2     Results from last 7 days   Lab Units 06/30/19  0542 06/29/19  0657   SODIUM mmol/L  --  137   POTASSIUM mmol/L  --  4 0   CHLORIDE mmol/L  --  108   CO2 mmol/L  --  24   BUN mg/dL  --  10   CREATININE mg/dL  --  0 49*   ANION GAP mmol/L  --  5   CALCIUM mg/dL  --  8 9   ALBUMIN g/dL 3 3* 3 3*   TOTAL BILIRUBIN mg/dL 0 40 0 35   ALK PHOS U/L 165* 160*   ALT U/L 75 67   AST U/L 75* 70*   GLUCOSE RANDOM mg/dL  --  127     Results from last 7 days   Lab Units 06/27/19  0905   INR  1 10     Results from last 7 days   Lab Units 06/27/19  1512   POC GLUCOSE mg/dl 85                   * I Have Reviewed All Lab Data Listed Above  * Additional Pertinent Lab Tests Reviewed:  All Labs Within Last 24 Hours Reviewed    Imaging:    Imaging Reports Reviewed Today Include:   Imaging Personally Reviewed by Myself Includes:     Recent Cultures (last 7 days):           Last 24 Hours Medication List:     Current Facility-Administered Medications:  acetaminophen 650 mg Per G Tube Q6H PRN Carolin Garrido MD   bisacodyl 10 mg Rectal HS PRN Carolin Garrido MD   Cannabidiol 500 mg Per G Tube BID Denise Hazel MD   cloBAZam 5 mg Per G Tube Q12H Quynh España MD   enoxaparin 30 mg Subcutaneous Q24H Jasbir Hatch MD   lactobacillus acidophilus-bulgaricus 1 packet Per PEG Tube TID With Meals Tree Laura MD   levETIRAcetam 1,000 mg Per G Tube Q8H Denise Hazel MD   LORazepam 2 mg Intravenous Q5 Min PRN Tree Laura MD   melatonin 6 mg Per G Tube HS Tree Laura MD   multivitamin with iron-minerals 5 mL Per G Tube Daily Tree Laura MD   ondansetron 4 mg Intravenous Q6H PRN Tree Laura MD   perampanel 4 mg Per OG Tube HS Denise Hazel MD   rufinamide 1,600 mg Per G Tube BID Tree Laura MD   topiramate 250 mg Per G Tube Q12H [facility] Tree Laura MD        Today, Patient Was Seen By: Geremias Simmons MD    ** Please Note: Dictation voice to text software may have been used in the creation of this document   **

## 2019-06-30 NOTE — PLAN OF CARE
Problem: Potential for Falls  Goal: Patient will remain free of falls  Description  INTERVENTIONS:  - Assess patient frequently for physical needs  -  Identify cognitive and physical deficits and behaviors that affect risk of falls    -  [place] fall precautions as indicated by assessment   - Educate patient/family on patient safety including physical limitations  - Instruct patient to call for assistance with activity based on assessment  - Modify environment to reduce risk of injury  - Consider OT/PT consult to assist with strengthening/mobility  Outcome: Progressing     Problem: Prexisting or High Potential for Compromised Skin Integrity  Goal: Skin integrity is maintained or improved  Description  INTERVENTIONS:  - Identify patients at risk for skin breakdown  - Assess and monitor skin integrity  - Assess and monitor nutrition and hydration status  - Monitor labs (i e  albumin)  - Assess for incontinence   - Turn and reposition patient  - Assist with mobility/ambulation  - Relieve pressure over bony prominences  - Avoid friction and shearing  - Provide appropriate hygiene as needed including keeping skin clean and dry  - Evaluate need for skin moisturizer/barrier cream  - Collaborate with interdisciplinary team (i e  Nutrition, Rehabilitation, etc )   - Patient/family teaching  Outcome: Progressing     Problem: SAFETY ADULT  Goal: Maintain or return to baseline ADL function  Description  INTERVENTIONS:  -  Assess patient's ability to carry out ADLs; assess patient's baseline for ADL function and identify physical deficits which impact ability to perform ADLs (bathing, care of mouth/teeth, toileting, grooming, dressing, etc )  - Assess/evaluate cause of self-care deficits   - Assess range of motion  - Assess patient's mobility; develop plan if impaired  - Assess patient's need for assistive devices and provide as appropriate  - Encourage maximum independence but intervene and supervise when necessary  ¯ Involve family in performance of ADLs  ¯ Assess for home care needs following discharge   ¯ Request OT consult to assist with ADL evaluation and planning for discharge  ¯ Provide patient education as appropriate  Outcome: Progressing  Goal: Maintain or return mobility status to optimal level  Description  INTERVENTIONS:  - Assess patient's baseline mobility status (ambulation, transfers, stairs, etc )    - Identify cognitive and physical deficits and behaviors that affect mobility  - Identify mobility aids required to assist with transfers and/or ambulation (gait belt, sit-to-stand, lift, walker, cane, etc )  - [place] fall precautions as indicated by assessment  - Record patient progress and toleration of activity level on Mobility SBAR; progress patient to next Phase/Stage  - Instruct patient to call for assistance with activity based on assessment  - Request Rehabilitation consult to assist with strengthening/weightbearing, etc   Outcome: Progressing     Problem: DISCHARGE PLANNING  Goal: Discharge to home or other facility with appropriate resources  Description  INTERVENTIONS:  - Identify barriers to discharge w/patient and caregiver  - Arrange for needed discharge resources and transportation as appropriate  - Identify discharge learning needs (meds, wound care, etc )  - Arrange for interpretive services to assist at discharge as needed  - Refer to Case Management Department for coordinating discharge planning if the patient needs post-hospital services based on physician/advanced practitioner order or complex needs related to functional status, cognitive ability, or social support system  Outcome: Progressing     Problem: Knowledge Deficit  Goal: Patient/family/caregiver demonstrates understanding of disease process, treatment plan, medications, and discharge instructions  Description  Complete learning assessment and assess knowledge base    Interventions:  - Provide teaching at level of understanding  - Provide teaching via preferred learning methods  Outcome: Progressing     Problem: NEUROSENSORY - ADULT  Goal: Achieves stable or improved neurological status  Description  INTERVENTIONS  - Monitor and report changes in neurological status  - Initiate measures to prevent increased intracranial pressure  - Maintain blood pressure and fluid volume within ordered parameters to optimize cerebral perfusion  - Monitor temperature, glucose, and sodium or any other associated labs   Initiate appropriate interventions as ordered  - Monitor for seizure activity   - Administer anti-seizure medications as ordered  Outcome: Progressing  Goal: Absence of seizures  Description  INTERVENTIONS  - Monitor for seizure activity  - Administer anti-seizure medications as ordered  - Monitor neurological status  Outcome: Progressing  Goal: Remains free of injury related to seizures activity  Description  INTERVENTIONS  - Maintain airway, patient safety  and administer oxygen as ordered  - Monitor patient for seizure activity, document and report duration and description of seizure to physician/advanced practitioner  - If seizure occurs,  ensure patient safety during seizure  - Reorient patient post seizure  - Seizure pads on all 4 side rails  - Instruct patient/family to notify RN of any seizure activity including if an aura is experienced  - Instruct patient/family to call for assistance with activity based on nursing assessment  - Administer anti-seizure medications as ordered  - Monitor fetal well being  Outcome: Progressing  Goal: Achieves maximal functionality and self care  Description  INTERVENTIONS  - Monitor swallowing and airway patency with patient fatigue and changes in neurological status  - Encourage and assist patient to increase activity and self care with guidance from rehab services  - Encourage visually impaired, hearing impaired and aphasic patients to use assistive/communication devices  Outcome: Progressing     Problem: Nutrition/Hydration-ADULT  Goal: Nutrient/Hydration intake appropriate for improving, restoring or maintaining nutritional needs  Description  Monitor and assess patient's nutrition/hydration status for malnutrition (ex- brittle hair, bruises, dry skin, pale skin and conjunctiva, muscle wasting, smooth red tongue, and disorientation)  Collaborate with interdisciplinary team and initiate plan and interventions as ordered  Monitor patient's weight and dietary intake as ordered or per policy  Utilize nutrition screening tool and intervene per policy  Determine patient's food preferences and provide high-protein, high-caloric foods as appropriate       INTERVENTIONS:  - Monitor oral intake, urinary output, labs, and treatment plans  - Assess nutrition and hydration status and recommend course of action  - Evaluate amount of meals eaten  - Assist patient with eating if necessary   - Allow adequate time for meals  - Recommend/ encourage appropriate diets, oral nutritional supplements, and vitamin/mineral supplements  - Order, calculate, and assess calorie counts as needed  - Recommend, monitor, and adjust tube feedings and TPN/PPN based on assessed needs  - Assess need for intravenous fluids  - Provide specific nutrition/hydration education as appropriate  - Include patient/family/caregiver in decisions related to nutrition  Outcome: Progressing

## 2019-06-30 NOTE — PLAN OF CARE
Problem: Potential for Falls  Goal: Patient will remain free of falls  Description  INTERVENTIONS:  - Assess patient frequently for physical needs  -  Identify cognitive and physical deficits and behaviors that affect risk of falls    -  [place] fall precautions as indicated by assessment   - Educate patient/family on patient safety including physical limitations  - Instruct patient to call for assistance with activity based on assessment  - Modify environment to reduce risk of injury  - Consider OT/PT consult to assist with strengthening/mobility  Outcome: Progressing     Problem: Prexisting or High Potential for Compromised Skin Integrity  Goal: Skin integrity is maintained or improved  Description  INTERVENTIONS:  - Identify patients at risk for skin breakdown  - Assess and monitor skin integrity  - Assess and monitor nutrition and hydration status  - Monitor labs (i e  albumin)  - Assess for incontinence   - Turn and reposition patient  - Assist with mobility/ambulation  - Relieve pressure over bony prominences  - Avoid friction and shearing  - Provide appropriate hygiene as needed including keeping skin clean and dry  - Evaluate need for skin moisturizer/barrier cream  - Collaborate with interdisciplinary team (i e  Nutrition, Rehabilitation, etc )   - Patient/family teaching  Outcome: Progressing     Problem: SAFETY ADULT  Goal: Maintain or return to baseline ADL function  Description  INTERVENTIONS:  -  Assess patient's ability to carry out ADLs; assess patient's baseline for ADL function and identify physical deficits which impact ability to perform ADLs (bathing, care of mouth/teeth, toileting, grooming, dressing, etc )  - Assess/evaluate cause of self-care deficits   - Assess range of motion  - Assess patient's mobility; develop plan if impaired  - Assess patient's need for assistive devices and provide as appropriate  - Encourage maximum independence but intervene and supervise when necessary  ¯ Involve family in performance of ADLs  ¯ Assess for home care needs following discharge   ¯ Request OT consult to assist with ADL evaluation and planning for discharge  ¯ Provide patient education as appropriate  Outcome: Progressing  Goal: Maintain or return mobility status to optimal level  Description  INTERVENTIONS:  - Assess patient's baseline mobility status (ambulation, transfers, stairs, etc )    - Identify cognitive and physical deficits and behaviors that affect mobility  - Identify mobility aids required to assist with transfers and/or ambulation (gait belt, sit-to-stand, lift, walker, cane, etc )  - [place] fall precautions as indicated by assessment  - Record patient progress and toleration of activity level on Mobility SBAR; progress patient to next Phase/Stage  - Instruct patient to call for assistance with activity based on assessment  - Request Rehabilitation consult to assist with strengthening/weightbearing, etc   Outcome: Progressing     Problem: DISCHARGE PLANNING  Goal: Discharge to home or other facility with appropriate resources  Description  INTERVENTIONS:  - Identify barriers to discharge w/patient and caregiver  - Arrange for needed discharge resources and transportation as appropriate  - Identify discharge learning needs (meds, wound care, etc )  - Arrange for interpretive services to assist at discharge as needed  - Refer to Case Management Department for coordinating discharge planning if the patient needs post-hospital services based on physician/advanced practitioner order or complex needs related to functional status, cognitive ability, or social support system  Outcome: Progressing     Problem: Knowledge Deficit  Goal: Patient/family/caregiver demonstrates understanding of disease process, treatment plan, medications, and discharge instructions  Description  Complete learning assessment and assess knowledge base    Interventions:  - Provide teaching at level of understanding  - Provide teaching via preferred learning methods  Outcome: Progressing     Problem: NEUROSENSORY - ADULT  Goal: Achieves stable or improved neurological status  Description  INTERVENTIONS  - Monitor and report changes in neurological status  - Initiate measures to prevent increased intracranial pressure  - Maintain blood pressure and fluid volume within ordered parameters to optimize cerebral perfusion  - Monitor temperature, glucose, and sodium or any other associated labs   Initiate appropriate interventions as ordered  - Monitor for seizure activity   - Administer anti-seizure medications as ordered  Outcome: Progressing  Goal: Absence of seizures  Description  INTERVENTIONS  - Monitor for seizure activity  - Administer anti-seizure medications as ordered  - Monitor neurological status  Outcome: Progressing  Goal: Remains free of injury related to seizures activity  Description  INTERVENTIONS  - Maintain airway, patient safety  and administer oxygen as ordered  - Monitor patient for seizure activity, document and report duration and description of seizure to physician/advanced practitioner  - If seizure occurs,  ensure patient safety during seizure  - Reorient patient post seizure  - Seizure pads on all 4 side rails  - Instruct patient/family to notify RN of any seizure activity including if an aura is experienced  - Instruct patient/family to call for assistance with activity based on nursing assessment  - Administer anti-seizure medications as ordered  - Monitor fetal well being  Outcome: Progressing  Goal: Achieves maximal functionality and self care  Description  INTERVENTIONS  - Monitor swallowing and airway patency with patient fatigue and changes in neurological status  - Encourage and assist patient to increase activity and self care with guidance from rehab services  - Encourage visually impaired, hearing impaired and aphasic patients to use assistive/communication devices  Outcome: Progressing     Problem: Nutrition/Hydration-ADULT  Goal: Nutrient/Hydration intake appropriate for improving, restoring or maintaining nutritional needs  Description  Monitor and assess patient's nutrition/hydration status for malnutrition (ex- brittle hair, bruises, dry skin, pale skin and conjunctiva, muscle wasting, smooth red tongue, and disorientation)  Collaborate with interdisciplinary team and initiate plan and interventions as ordered  Monitor patient's weight and dietary intake as ordered or per policy  Utilize nutrition screening tool and intervene per policy  Determine patient's food preferences and provide high-protein, high-caloric foods as appropriate       INTERVENTIONS:  - Monitor oral intake, urinary output, labs, and treatment plans  - Assess nutrition and hydration status and recommend course of action  - Evaluate amount of meals eaten  - Assist patient with eating if necessary   - Allow adequate time for meals  - Recommend/ encourage appropriate diets, oral nutritional supplements, and vitamin/mineral supplements  - Order, calculate, and assess calorie counts as needed  - Recommend, monitor, and adjust tube feedings and TPN/PPN based on assessed needs  - Assess need for intravenous fluids  - Provide specific nutrition/hydration education as appropriate  - Include patient/family/caregiver in decisions related to nutrition  Outcome: Progressing

## 2019-06-30 NOTE — PROGRESS NOTES
Neurology/Epilepsy Progress Note  Patient Name:  Karyle Pillion  : 1980  MRN: [id number]  Encounter: [id number]  Room: [place] [address]/[place] [address]  Date of admission: 2019  Date: 19    CC: She was in tonic status epilepticus throughout the evening  Interval History:   Ms Larry Brown was doing very well during the day yesterday with respect to nearly no seizures  She was started on Glucerna tube feeds yesterday  It was not until about 10PM last night, as she was going to sleep, when she started to have generalized tonic seizures nearly every 2-5 minutes again  The seizures persisted, waiting for it to stop naturally  However by 3AM it did not stop  One dose of valproic acid IV 250mg was given at 03:30  But the tonic seizures persisted unchange in frequency  She was given another 250mg valproic acid IV at 06:29, but the seizures continued  Even with such a low dose of valproic acid, her ammonia level increased  She was then given lorazepam 2mg IV at 07:42  By 08:00, the severity and duration of the generalized tonic seizures started to lessen and eventually stopped around 10:00, with occasional seizures      Current AEDs:  Epidiolex (Cannabidiol) 100mg/mL 5mL twice a day  levetiracetam 1000mg every 8 hours  perampanel 2mg at bedtime  rufinamide 1600mg twice a day  topiramate 250mg twice a day    Additional Past Medical/Surgical History:  No additional medical history is provided by the patient and is unchanged from the initial H+P    PFSHx:  [x] unable to obtain due to cognitive impairment    Current Medications:    Current Facility-Administered Medications:  acetaminophen 650 mg Per G Tube Q6H PRN Chhaya Harrison MD   bisacodyl 10 mg Rectal HS PRN Chhaya Harrison MD   Cannabidiol 500 mg Per G Tube BID Wilbert Alas MD   cloBAZam 5 mg Per G Tube Q12H Wilbert Alas MD   enoxaparin 30 mg Subcutaneous Q24H [address] Lucinda Francis MD   lactobacillus acidophilus-bulgaricus 1 packet Per PEG Tube TID With Meals Prashanth Gibson MD   levETIRAcetam 1,000 mg Per G Tube Q8H Concepción Goyal MD   LORazepam 2 mg Intravenous Q5 Min PRN Prashanth Gibson MD   melatonin 6 mg Per G Tube HS Prashanth Gibson MD   multivitamin with iron-minerals 5 mL Per G Tube Daily Prashanth Gibson MD   ondansetron 4 mg Intravenous Q6H PRN Prashanth Gibson MD   perampanel 4 mg Per OG Tube HS Concepción Goyal MD   rufinamide 1,600 mg Per G Tube BID Prashanth Gibson MD   topiramate 250 mg Per G Tube Q12H Vero Douglass MD       PRN medications:     acetaminophen    bisacodyl    LORazepam    ondansetron    Allergies: Allergies   Allergen Reactions    Felbamate        Review of Systems  Review of systems not obtained due to patient factors nonverbal/cognitive impairment     Physical Exam:  EXAMINATION   (any 12=Level 3; any 6=Level 2; <6 = Level 1)  Elaborate all abnormal findings  General exam   Blood pressure 99/66, pulse 91, temperature 98 2 °F (36 8 °C), resp  rate 16, height 5' 3" (1 6 m), SpO2 95 %     Appearance: awake, nonverbal, limited attention span  Cardiovascular: unable to hear; patient restless  Pulmonary: unable to hear, she is restless  Abdominal: soft, nontender, nondistended  Extremities: no edema, pedal pulses are intact    HEENT: anicteric and moist mucus membranes / oral cavity   Fundoscopy: not assessed    Mental status  Orientation: awake, nonverbal  Fund of Knowledge: poor   Attention and Concentration: poor attention  Current and Remote Memory:not assessed  Language: does not follow commands, no language output    Cranial Nerves  CN 2: not assessed   CN 3, 4, 6: EOMI, no nystagmus  CN 5:not assessed  CN 7:muscles of facial expression are symmetric  CN 8:not assessed  CN 9, 10:not assessed  CN 11:not assessed  CN 12:tongue is midline    Motor:  Bulk, Tone: hypotonic, thin muscle bulk  Pronation: unable to test due to patient's condition  Strength: there is no lateralizing weakness, she seems to move all limbs symmetrically    [x] I personally reviewed the EEG tracing  Labs:  Component      Latest Ref Rng & Units 6/27/2019 6/29/2019 6/30/2019   WBC      4 31 - 10 16 Thousand/uL  7 84    Red Blood Cell Count      3 81 - 5 12 Million/uL  3 85    Hemoglobin      11 5 - 15 4 g/dL  12 3    HCT      34 8 - 46 1 %  38 4    Platelet Count      475 - 390 Thousands/uL  219    Sodium      136 - 145 mmol/L 140 137    Potassium      3 5 - 5 3 mmol/L 3 8 4 0    Chloride      100 - 108 mmol/L 108 108    CO2      21 - 32 mmol/L 21 24    Anion Gap      4 - 13 mmol/L 11 5    BUN      5 - 25 mg/dL 12 10    Creatinine      0 60 - 1 30 mg/dL 0 56 (L) 0 49 (L)    Glucose, Random      65 - 140 mg/dL 160 (H) 127    Calcium      8 3 - 10 1 mg/dL 9 0 8 9    AST      5 - 45 U/L 46 (H) 70 (H) 75 (H)   ALT      12 - 78 U/L 58 67 75   Alkaline Phosphatase      46 - 116 U/L 185 (H) 160 (H) 165 (H)   Total Protein      6 4 - 8 2 g/dL 7 6 7 2 7 3   Albumin      3 5 - 5 0 g/dL 3 4 (L) 3 3 (L) 3 3 (L)   TOTAL BILIRUBIN      0 20 - 1 00 mg/dL 0 30 0 35 0 40   eGFR      ml/min/1 73sq m 119 124    BILIRUBIN DIRECT      0 00 - 0 20 mg/dL   0 09   Ammonia      11 - 35 umol/L 26  58 (H)   TOPIRAMATE LEVEL      2 0 - 25 0 ug/mL 4 3     LEVETIRACETA (KEPPRA)      10 0 - 40 0 ug/mL 26 4       5/28/2019  Levetiracetam 34 7  Topiramate 12 0  Rufinamide 32 7    Radiology:  3/20/2019 MRI Brain  Normal MR of the brain    Continuous video EEG 6/29-6/30/2019 reviewed to 8AM:   There is excessive diffuse beta with intermittent diffuse sharply contoured 4-5 Hz theta activity during the waking background  There are frequent generalized spike-slow wave or polyspike complexes  There are also bilateral temporal moderate-high voltage sharp waves maximal over T3 and T4  Starting around 22:14, there were innumerable generalized tonic seizures with generalized paroxysmal fast activity (tonic seizure as described in yesterday's note)  This persisted through to 08:00    See full video EEG report for details  Assessment:  Ms Edgardo Caba is a 46 y o  woman with intractable Lennox Gastaut Epilepsy Syndrome with extremely frequent tonic seizures  Her seizures are generally associated with sleep, tonic seizures that may recur in cluster and likely daily seizures  Her seizures can be easily missed and result in under reporting of seizures  She is intractable and on 5 AEDs  Complicated by medication side effects (benzodiazepine/barbiturate caused excessive somnolence; valproate caused hyperammonia and elevated LFTs)  Based on EEG monitoring, she would qualify to have generalized tonic seizures with status epilepticus since she had seizures recurring every 2-5 minutes starting around 10PM   We tried low doses of valproic acid did not abort the seizures, but caused elevated ammonia level); but it seems that she was responsive to lorazepam, which may take longer than 15 minutes to take effect  Given that she was responsive to lorazepam, we can try her on Onfi again (higher doses caused excessive sedation but now that we have her off of valproic acid she may have a better reaction)  But with Epidiolex, there is a risk of increased sedation with Onfi  Epidiolex may also be contributing to elevated AST  It is unclear if perampanel has been useful in controlling her seizure, will continue to reduce the dose to minimize side effects, potentially wean off  However, perampanel has an extremely long half-life so may take weeks to completely be out of her system  Now, from the day of admission levetiracetam and topiramate levels, these levels are significantly lower than her pre-admission levels from the end of May 2019  Her topiramate is about a third, which makes me wonder if there were some missed doses  Recommendations:  - Continue with continuous video EEG monitoring with single lead ECG for another day to count the frequency of her seizures with current medications    - Start Onfi 5mg twice a day  - Decrease Epidiolex (Cannabidiol) 100mg/mL to 4mL twice a day (interaction with Azul Dad may worsen sedation and elevated liver enzymes)  -  Levetiracetam 1000mg every 8 hours  - Decrease Perampanel to 2mg at bedtime, plan on discontinuing this medication as it has not proven to be helpful in controlling her epilepsy  - Rufinamide 1600mg twice a day  - Topiramate 250mg twice a day  - appreciate nutritionist recommendation regarding lower glycemic index tube feed  - recheck topiramate, LFT, and Ammonia level in a couple of days  [x] Decision making was of high-complexity due to the patient's high risk condition (seizures), psychiatric and neuropsychological comorbidities, behavioral problems, memory and cognitive problems and medication side effects        [] Counseling and/or Coordination of Care: (time_____  Start:          End:          )   (>50% of Total Floor Time: Spent with Patient/Family)  Points of discussion:  [] Diagnosis    [] AED adverse effects    [] Compliance   [] Driving   [] Seizure safety    [] Surgery    [] VNS  [] Contraception       [] Pregnancy   [] Hormones                    [] Bone health

## 2019-07-01 ENCOUNTER — APPOINTMENT (INPATIENT)
Dept: NEUROLOGY | Facility: AMBULATORY SURGERY CENTER | Age: 39
DRG: 101 | End: 2019-07-01
Payer: MEDICARE

## 2019-07-01 PROCEDURE — 95951 HB EEG MONITORING/VIDEORECORD: CPT

## 2019-07-01 PROCEDURE — 99232 SBSQ HOSP IP/OBS MODERATE 35: CPT | Performed by: INTERNAL MEDICINE

## 2019-07-01 PROCEDURE — 95951 PR EEG MONITORING/VIDEORECORD: CPT | Performed by: PSYCHIATRY & NEUROLOGY

## 2019-07-01 PROCEDURE — 99232 SBSQ HOSP IP/OBS MODERATE 35: CPT | Performed by: PHYSICIAN ASSISTANT

## 2019-07-01 RX ORDER — CLOBAZAM 10 MG/1
5 TABLET ORAL EVERY 12 HOURS
Qty: 31 TABLET | Refills: 5 | Status: SHIPPED | OUTPATIENT
Start: 2019-07-01 | End: 2019-08-22 | Stop reason: SDUPTHER

## 2019-07-01 RX ADMIN — PERAMPANEL 2 MG: 0.5 SUSPENSION ORAL at 21:53

## 2019-07-01 RX ADMIN — TOPIRAMATE 250 MG: 100 TABLET, FILM COATED ORAL at 21:53

## 2019-07-01 RX ADMIN — LACTOBACILLUS ACIDOPHILUS / LACTOBACILLUS BULGARICUS 1 PACKET: 100 MILLION CFU STRENGTH GRANULES at 08:01

## 2019-07-01 RX ADMIN — TOPIRAMATE 250 MG: 100 TABLET, FILM COATED ORAL at 08:01

## 2019-07-01 RX ADMIN — LACTOBACILLUS ACIDOPHILUS / LACTOBACILLUS BULGARICUS 1 PACKET: 100 MILLION CFU STRENGTH GRANULES at 21:53

## 2019-07-01 RX ADMIN — LEVETIRACETAM 1000 MG: 100 SOLUTION ORAL at 21:52

## 2019-07-01 RX ADMIN — MULTIVITAMIN 5 ML: LIQUID ORAL at 08:02

## 2019-07-01 RX ADMIN — ENOXAPARIN SODIUM 30 MG: 30 INJECTION SUBCUTANEOUS at 08:00

## 2019-07-01 RX ADMIN — LACTOBACILLUS ACIDOPHILUS / LACTOBACILLUS BULGARICUS 1 PACKET: 100 MILLION CFU STRENGTH GRANULES at 13:18

## 2019-07-01 RX ADMIN — SODIUM CHLORIDE 200 MG: 9 INJECTION, SOLUTION INTRAVENOUS at 07:57

## 2019-07-01 RX ADMIN — RUFINAMIDE 1600 MG: 400 TABLET, FILM COATED ORAL at 21:52

## 2019-07-01 RX ADMIN — LEVETIRACETAM 1000 MG: 100 SOLUTION ORAL at 13:19

## 2019-07-01 RX ADMIN — LORAZEPAM 2 MG: 2 INJECTION INTRAMUSCULAR; INTRAVENOUS at 04:28

## 2019-07-01 RX ADMIN — RUFINAMIDE 1600 MG: 400 TABLET, FILM COATED ORAL at 13:18

## 2019-07-01 RX ADMIN — CLOBAZAM 5 MG: 10 TABLET ORAL at 09:41

## 2019-07-01 RX ADMIN — CLOBAZAM 5 MG: 10 TABLET ORAL at 21:53

## 2019-07-01 RX ADMIN — MELATONIN 6 MG: 3 TAB ORAL at 21:53

## 2019-07-01 RX ADMIN — LEVETIRACETAM 1000 MG: 100 SOLUTION ORAL at 05:52

## 2019-07-01 NOTE — SOCIAL WORK
Updated clinical information and medication changes sent to [facility] via [facility]

## 2019-07-01 NOTE — PROGRESS NOTES
Progress Note - Neurology   Epifanio Kate 46 y o  female MRN: [id number]  Unit/Bed#: [facility] 271-14 Encounter: [id number]    Assessment:  30-year-old female with past medical history of Lennox-Gastaut syndrome and intractable seizure disorder  Lennox-Gastaut with intractable tonic seizures:  -her seizures typically occur during sleep   -she had multiple tonic seizures while on continuous video EEG monitoring and has had several changes made to her medications during this admission:  She is currently on Epidiolex 400 mg q 12 hours, Keppra 1 g q 8 hours, perampanel  2 mg HS, rufinamide 1600 mg b i d , topiramate 250 mg b i d , and Onfi 5 mg b i d  -tube feedings were changed to Glucerna with lower carbohydrate content and higher protein and fat content   -she remains on continuous video EEG monitoring   -she was noted to have elevation in ammonia with even low doses of valproic acid  Plan:  1  Continue medications as listed above  2  Check topiramate level, LFTs, and ammonia tomorrow, 07/02/2019   3  Continuation of video EEG monitoring as per Epileptology  Discussed with Dr Alessio Andersen  Subjective:   Patient has had multiple tonic seizures while on continuous video EEG monitoring, typically during sleep  Dr Glennette Gaucher has made several changes to her medications, including decreasing Epidiolex dose, decrease in perampanel dose, increasing Keppra frequency, and initiating Onfi  ROS:  Unable to obtain as patient nonverbal     Vitals: Blood pressure 117/77, pulse (!) 106, temperature 99 1 °F (37 3 °C), resp  rate 18, height 5' 3" (1 6 m), SpO2 94 %  ,Body mass index is 20 42 kg/m²  Physical Exam:   General:  Patient appears quite sedated in no acute distress  Neurologic:  Patient is somnolent  Requires sternal rub to stimulate movement of her extremities  When her eyes are manually opened, she will look around briefly and then for some shut    Symmetric movement all 4 extremities noted with sternal rub as above  Lab, Imaging and other studies: I have personally reviewed pertinent reports  VTE Prophylaxis: Sequential compression device (Venodyne)     Counseling / Coordination of Care  Total time spent today 25 minutes  Greater than 50% of total time was spent with the patient and / or family counseling and / or coordination of care   A description of the counseling / coordination of care: Discussion with epileptologist

## 2019-07-01 NOTE — PROGRESS NOTES
Progress Note - Ewelina Beverly 1980, 46 y o  female MRN: [id number]    Unit/Bed#: [facility] 010-12 Encounter: [id number]    Primary Care Provider: Lisa Santillan MD   Date and time admitted to hospital: 2019  6:00 PM        * Lennox-Gastaut syndrome with tonic seizures ([address] )  Assessment & Plan  · Ongoing seizures  · Discussed with Neurology  · Continue antiepileptic regimen per Neurology    S/P placement of VNS (vagus nerve stimulation) device  Assessment & Plan  Neurology following     Transaminitis  Assessment & Plan  · Monitor    Hyperammonemia (HCC)  Assessment & Plan  · Monitor levels    Intellectual disability  Assessment & Plan  Supportive care  One-to-one observation  Turn q 2 hours    Underweight  Assessment & Plan  Consulted dietary  · Continue tube feeds, free water flushes  · Bladder scan Q shift  · Elevate head of bed, aspiration precuations         VTE Pharmacologic Prophylaxis:   Pharmacologic: Enoxaparin (Lovenox)  Mechanical VTE Prophylaxis in Place: Yes    Patient Centered Rounds: I have performed bedside rounds with nursing staff today  Discussions with Specialists or Other Care Team Provider:     Education and Discussions with Family / Patient:  Called and left a message for for mother Baudilio Zhong at [phone]    Time Spent for Care: 30 minutes  More than 50% of total time spent on counseling and coordination of care as described above      Current Length of Stay: 4 day(s)    Current Patient Status: Inpatient   Certification Statement: The patient will continue to require additional inpatient hospital stay due to as mentioned    Discharge Plan: awaiting clinical and symptomatic improvement     Code Status: Level 1 - Full Code      Subjective:     Comfortably in bed      Objective:     Vitals:   Temp (24hrs), Av 6 °F (37 °C), Min:98 2 °F (36 8 °C), Max:99 1 °F (37 3 °C)    Temp:  [98 2 °F (36 8 °C)-99 1 °F (37 3 °C)] 99 1 °F (37 3 °C)  HR:  [] 106  Resp:  [18-20] 18  BP: (107-124)/(71-82) 117/77  SpO2:  [94 %-98 %] 94 %  Body mass index is 20 42 kg/m²  Input and Output Summary (last 24 hours): Intake/Output Summary (Last 24 hours) at 7/1/2019 1541  Last data filed at 7/1/2019 1401  Gross per 24 hour   Intake 1183 ml   Output 1686 ml   Net -503 ml       Physical Exam:     Physical Exam    Comfortably lying in bed  Neck supple  Lungs diminished breath sounds bilaterally  Heart sounds S1-S2 noted  Abdomen soft  Drowsy nonverbal  Pulses noted  No pedal edema  No rash    Additional Data:     Labs:    Results from last 7 days   Lab Units 06/29/19  0657   WBC Thousand/uL 7 84   HEMOGLOBIN g/dL 12 3   HEMATOCRIT % 38 4   PLATELETS Thousands/uL 219   NEUTROS PCT % 52   LYMPHS PCT % 33   MONOS PCT % 12   EOS PCT % 2     Results from last 7 days   Lab Units 06/30/19  0542 06/29/19  0657   SODIUM mmol/L  --  137   POTASSIUM mmol/L  --  4 0   CHLORIDE mmol/L  --  108   CO2 mmol/L  --  24   BUN mg/dL  --  10   CREATININE mg/dL  --  0 49*   ANION GAP mmol/L  --  5   CALCIUM mg/dL  --  8 9   ALBUMIN g/dL 3 3* 3 3*   TOTAL BILIRUBIN mg/dL 0 40 0 35   ALK PHOS U/L 165* 160*   ALT U/L 75 67   AST U/L 75* 70*   GLUCOSE RANDOM mg/dL  --  127     Results from last 7 days   Lab Units 06/27/19  0905   INR  1 10     Results from last 7 days   Lab Units 06/27/19  1512   POC GLUCOSE mg/dl 85                   * I Have Reviewed All Lab Data Listed Above  * Additional Pertinent Lab Tests Reviewed:  All Labs Within Last 24 Hours Reviewed    Imaging:    Imaging Reports Reviewed Today Include:   Imaging Personally Reviewed by Myself Includes:     Recent Cultures (last 7 days):           Last 24 Hours Medication List:     Current Facility-Administered Medications:  acetaminophen 650 mg Per G Tube Q6H PRN Manjit Craft MD   bisacodyl 10 mg Rectal HS PRN Manjit Craft MD   Cannabidiol 400 mg Per G Tube BID Rubina Ruby MD   cloBAZam 5 mg Per G Tube Q12H Rubina Ruby MD   enoxaparin 30 mg Subcutaneous Q24H [facility] Raffy Ryan MD   lactobacillus acidophilus-bulgaricus 1 packet Per PEG Tube TID With Meals Kristie Benites MD   levETIRAcetam 1,000 mg Per G Tube Q8H Sherri Mckinney MD   LORazepam 2 mg Intravenous Q5 Min PRN Kristie Benites MD   melatonin 6 mg Per G Tube HS Kristie Benites MD   multivitamin with iron-minerals 5 mL Per G Tube Daily Kristie Benites MD   ondansetron 4 mg Intravenous Q6H PRN Kristie Benites MD   perampanel 2 mg Per OG Tube HS Sherri Mckinney MD   rufinamide 1,600 mg Per G Tube BID Kristie Benites MD   topiramate 250 mg Per Leeroy Beard MD        Today, Patient Was Seen By: Raffy Ryan MD    ** Please Note: Dictation voice to text software may have been used in the creation of this document   **

## 2019-07-01 NOTE — QUICK NOTE
I called [facility] in regards to getting more Banzel  The unit nurse reports that they may not have enough pills there, but she is not aware of how to authorize it to be released  She directed me to speak with the Director of Nursing (Caterina)  I left a message on her voicemail for a return phone call  Banzel is not available in [address]  I called the inpatient pharmacy to try to locate [facility], may have been left over from a prior admission  They have about a week supply

## 2019-07-01 NOTE — PLAN OF CARE
Problem: Potential for Falls  Goal: Patient will remain free of falls  Description  INTERVENTIONS:  - Assess patient frequently for physical needs  -  Identify cognitive and physical deficits and behaviors that affect risk of falls    -  [place] fall precautions as indicated by assessment   - Educate patient/family on patient safety including physical limitations  - Instruct patient to call for assistance with activity based on assessment  - Modify environment to reduce risk of injury  - Consider OT/PT consult to assist with strengthening/mobility  Outcome: Progressing     Problem: Prexisting or High Potential for Compromised Skin Integrity  Goal: Skin integrity is maintained or improved  Description  INTERVENTIONS:  - Identify patients at risk for skin breakdown  - Assess and monitor skin integrity  - Assess and monitor nutrition and hydration status  - Monitor labs (i e  albumin)  - Assess for incontinence   - Turn and reposition patient  - Assist with mobility/ambulation  - Relieve pressure over bony prominences  - Avoid friction and shearing  - Provide appropriate hygiene as needed including keeping skin clean and dry  - Evaluate need for skin moisturizer/barrier cream  - Collaborate with interdisciplinary team (i e  Nutrition, Rehabilitation, etc )   - Patient/family teaching  Outcome: Progressing     Problem: SAFETY ADULT  Goal: Maintain or return to baseline ADL function  Description  INTERVENTIONS:  -  Assess patient's ability to carry out ADLs; assess patient's baseline for ADL function and identify physical deficits which impact ability to perform ADLs (bathing, care of mouth/teeth, toileting, grooming, dressing, etc )  - Assess/evaluate cause of self-care deficits   - Assess range of motion  - Assess patient's mobility; develop plan if impaired  - Assess patient's need for assistive devices and provide as appropriate  - Encourage maximum independence but intervene and supervise when necessary  ¯ Involve family in performance of ADLs  ¯ Assess for home care needs following discharge   ¯ Request OT consult to assist with ADL evaluation and planning for discharge  ¯ Provide patient education as appropriate  Outcome: Progressing  Goal: Maintain or return mobility status to optimal level  Description  INTERVENTIONS:  - Assess patient's baseline mobility status (ambulation, transfers, stairs, etc )    - Identify cognitive and physical deficits and behaviors that affect mobility  - Identify mobility aids required to assist with transfers and/or ambulation (gait belt, sit-to-stand, lift, walker, cane, etc )  - [place] fall precautions as indicated by assessment  - Record patient progress and toleration of activity level on Mobility SBAR; progress patient to next Phase/Stage  - Instruct patient to call for assistance with activity based on assessment  - Request Rehabilitation consult to assist with strengthening/weightbearing, etc   Outcome: Progressing     Problem: DISCHARGE PLANNING  Goal: Discharge to home or other facility with appropriate resources  Description  INTERVENTIONS:  - Identify barriers to discharge w/patient and caregiver  - Arrange for needed discharge resources and transportation as appropriate  - Identify discharge learning needs (meds, wound care, etc )  - Arrange for interpretive services to assist at discharge as needed  - Refer to Case Management Department for coordinating discharge planning if the patient needs post-hospital services based on physician/advanced practitioner order or complex needs related to functional status, cognitive ability, or social support system  Outcome: Progressing     Problem: Knowledge Deficit  Goal: Patient/family/caregiver demonstrates understanding of disease process, treatment plan, medications, and discharge instructions  Description  Complete learning assessment and assess knowledge base    Interventions:  - Provide teaching at level of understanding  - Provide teaching via preferred learning methods  Outcome: Progressing     Problem: NEUROSENSORY - ADULT  Goal: Achieves stable or improved neurological status  Description  INTERVENTIONS  - Monitor and report changes in neurological status  - Initiate measures to prevent increased intracranial pressure  - Maintain blood pressure and fluid volume within ordered parameters to optimize cerebral perfusion  - Monitor temperature, glucose, and sodium or any other associated labs   Initiate appropriate interventions as ordered  - Monitor for seizure activity   - Administer anti-seizure medications as ordered  Outcome: Progressing  Goal: Absence of seizures  Description  INTERVENTIONS  - Monitor for seizure activity  - Administer anti-seizure medications as ordered  - Monitor neurological status  Outcome: Progressing  Goal: Remains free of injury related to seizures activity  Description  INTERVENTIONS  - Maintain airway, patient safety  and administer oxygen as ordered  - Monitor patient for seizure activity, document and report duration and description of seizure to physician/advanced practitioner  - If seizure occurs,  ensure patient safety during seizure  - Reorient patient post seizure  - Seizure pads on all 4 side rails  - Instruct patient/family to notify RN of any seizure activity including if an aura is experienced  - Instruct patient/family to call for assistance with activity based on nursing assessment  - Administer anti-seizure medications as ordered  - Monitor fetal well being  Outcome: Progressing  Goal: Achieves maximal functionality and self care  Description  INTERVENTIONS  - Monitor swallowing and airway patency with patient fatigue and changes in neurological status  - Encourage and assist patient to increase activity and self care with guidance from rehab services  - Encourage visually impaired, hearing impaired and aphasic patients to use assistive/communication devices  Outcome: Progressing     Problem: Nutrition/Hydration-ADULT  Goal: Nutrient/Hydration intake appropriate for improving, restoring or maintaining nutritional needs  Description  Monitor and assess patient's nutrition/hydration status for malnutrition (ex- brittle hair, bruises, dry skin, pale skin and conjunctiva, muscle wasting, smooth red tongue, and disorientation)  Collaborate with interdisciplinary team and initiate plan and interventions as ordered  Monitor patient's weight and dietary intake as ordered or per policy  Utilize nutrition screening tool and intervene per policy  Determine patient's food preferences and provide high-protein, high-caloric foods as appropriate       INTERVENTIONS:  - Monitor oral intake, urinary output, labs, and treatment plans  - Assess nutrition and hydration status and recommend course of action  - Evaluate amount of meals eaten  - Assist patient with eating if necessary   - Allow adequate time for meals  - Recommend/ encourage appropriate diets, oral nutritional supplements, and vitamin/mineral supplements  - Order, calculate, and assess calorie counts as needed  - Recommend, monitor, and adjust tube feedings and TPN/PPN based on assessed needs  - Assess need for intravenous fluids  - Provide specific nutrition/hydration education as appropriate  - Include patient/family/caregiver in decisions related to nutrition  Outcome: Progressing

## 2019-07-01 NOTE — PLAN OF CARE
Problem: Potential for Falls  Goal: Patient will remain free of falls  Description  INTERVENTIONS:  - Assess patient frequently for physical needs  -  Identify cognitive and physical deficits and behaviors that affect risk of falls    -  [place] fall precautions as indicated by assessment   - Educate patient/family on patient safety including physical limitations  - Instruct patient to call for assistance with activity based on assessment  - Modify environment to reduce risk of injury  - Consider OT/PT consult to assist with strengthening/mobility  Outcome: Progressing     Problem: Prexisting or High Potential for Compromised Skin Integrity  Goal: Skin integrity is maintained or improved  Description  INTERVENTIONS:  - Identify patients at risk for skin breakdown  - Assess and monitor skin integrity  - Assess and monitor nutrition and hydration status  - Monitor labs (i e  albumin)  - Assess for incontinence   - Turn and reposition patient  - Assist with mobility/ambulation  - Relieve pressure over bony prominences  - Avoid friction and shearing  - Provide appropriate hygiene as needed including keeping skin clean and dry  - Evaluate need for skin moisturizer/barrier cream  - Collaborate with interdisciplinary team (i e  Nutrition, Rehabilitation, etc )   - Patient/family teaching  Outcome: Progressing     Problem: SAFETY ADULT  Goal: Maintain or return to baseline ADL function  Description  INTERVENTIONS:  -  Assess patient's ability to carry out ADLs; assess patient's baseline for ADL function and identify physical deficits which impact ability to perform ADLs (bathing, care of mouth/teeth, toileting, grooming, dressing, etc )  - Assess/evaluate cause of self-care deficits   - Assess range of motion  - Assess patient's mobility; develop plan if impaired  - Assess patient's need for assistive devices and provide as appropriate  - Encourage maximum independence but intervene and supervise when necessary  ¯ Involve family in performance of ADLs  ¯ Assess for home care needs following discharge   ¯ Request OT consult to assist with ADL evaluation and planning for discharge  ¯ Provide patient education as appropriate  Outcome: Progressing  Goal: Maintain or return mobility status to optimal level  Description  INTERVENTIONS:  - Assess patient's baseline mobility status (ambulation, transfers, stairs, etc )    - Identify cognitive and physical deficits and behaviors that affect mobility  - Identify mobility aids required to assist with transfers and/or ambulation (gait belt, sit-to-stand, lift, walker, cane, etc )  - [place] fall precautions as indicated by assessment  - Record patient progress and toleration of activity level on Mobility SBAR; progress patient to next Phase/Stage  - Instruct patient to call for assistance with activity based on assessment  - Request Rehabilitation consult to assist with strengthening/weightbearing, etc   Outcome: Progressing     Problem: DISCHARGE PLANNING  Goal: Discharge to home or other facility with appropriate resources  Description  INTERVENTIONS:  - Identify barriers to discharge w/patient and caregiver  - Arrange for needed discharge resources and transportation as appropriate  - Identify discharge learning needs (meds, wound care, etc )  - Arrange for interpretive services to assist at discharge as needed  - Refer to Case Management Department for coordinating discharge planning if the patient needs post-hospital services based on physician/advanced practitioner order or complex needs related to functional status, cognitive ability, or social support system  Outcome: Progressing     Problem: Knowledge Deficit  Goal: Patient/family/caregiver demonstrates understanding of disease process, treatment plan, medications, and discharge instructions  Description  Complete learning assessment and assess knowledge base    Interventions:  - Provide teaching at level of understanding  - Provide teaching via preferred learning methods  Outcome: Progressing     Problem: NEUROSENSORY - ADULT  Goal: Achieves stable or improved neurological status  Description  INTERVENTIONS  - Monitor and report changes in neurological status  - Initiate measures to prevent increased intracranial pressure  - Maintain blood pressure and fluid volume within ordered parameters to optimize cerebral perfusion  - Monitor temperature, glucose, and sodium or any other associated labs   Initiate appropriate interventions as ordered  - Monitor for seizure activity   - Administer anti-seizure medications as ordered  Outcome: Progressing  Goal: Absence of seizures  Description  INTERVENTIONS  - Monitor for seizure activity  - Administer anti-seizure medications as ordered  - Monitor neurological status  Outcome: Progressing  Goal: Remains free of injury related to seizures activity  Description  INTERVENTIONS  - Maintain airway, patient safety  and administer oxygen as ordered  - Monitor patient for seizure activity, document and report duration and description of seizure to physician/advanced practitioner  - If seizure occurs,  ensure patient safety during seizure  - Reorient patient post seizure  - Seizure pads on all 4 side rails  - Instruct patient/family to notify RN of any seizure activity including if an aura is experienced  - Instruct patient/family to call for assistance with activity based on nursing assessment  - Administer anti-seizure medications as ordered  - Monitor fetal well being  Outcome: Progressing  Goal: Achieves maximal functionality and self care  Description  INTERVENTIONS  - Monitor swallowing and airway patency with patient fatigue and changes in neurological status  - Encourage and assist patient to increase activity and self care with guidance from rehab services  - Encourage visually impaired, hearing impaired and aphasic patients to use assistive/communication devices  Outcome: Progressing     Problem: Nutrition/Hydration-ADULT  Goal: Nutrient/Hydration intake appropriate for improving, restoring or maintaining nutritional needs  Description  Monitor and assess patient's nutrition/hydration status for malnutrition (ex- brittle hair, bruises, dry skin, pale skin and conjunctiva, muscle wasting, smooth red tongue, and disorientation)  Collaborate with interdisciplinary team and initiate plan and interventions as ordered  Monitor patient's weight and dietary intake as ordered or per policy  Utilize nutrition screening tool and intervene per policy  Determine patient's food preferences and provide high-protein, high-caloric foods as appropriate       INTERVENTIONS:  - Monitor oral intake, urinary output, labs, and treatment plans  - Assess nutrition and hydration status and recommend course of action  - Evaluate amount of meals eaten  - Assist patient with eating if necessary   - Allow adequate time for meals  - Recommend/ encourage appropriate diets, oral nutritional supplements, and vitamin/mineral supplements  - Order, calculate, and assess calorie counts as needed  - Recommend, monitor, and adjust tube feedings and TPN/PPN based on assessed needs  - Assess need for intravenous fluids  - Provide specific nutrition/hydration education as appropriate  - Include patient/family/caregiver in decisions related to nutrition  Outcome: Progressing

## 2019-07-02 ENCOUNTER — APPOINTMENT (INPATIENT)
Dept: NEUROLOGY | Facility: AMBULATORY SURGERY CENTER | Age: 39
DRG: 101 | End: 2019-07-02
Payer: MEDICARE

## 2019-07-02 LAB
ALBUMIN SERPL BCP-MCNC: 3.5 G/DL (ref 3.5–5)
ALP SERPL-CCNC: 166 U/L (ref 46–116)
ALT SERPL W P-5'-P-CCNC: 94 U/L (ref 12–78)
AMMONIA PLAS-SCNC: 34 UMOL/L (ref 11–35)
ANION GAP SERPL CALCULATED.3IONS-SCNC: 6 MMOL/L (ref 4–13)
AST SERPL W P-5'-P-CCNC: 82 U/L (ref 5–45)
BILIRUB SERPL-MCNC: 0.49 MG/DL (ref 0.2–1)
BUN SERPL-MCNC: 13 MG/DL (ref 5–25)
CALCIUM SERPL-MCNC: 9.6 MG/DL (ref 8.3–10.1)
CHLORIDE SERPL-SCNC: 109 MMOL/L (ref 100–108)
CO2 SERPL-SCNC: 25 MMOL/L (ref 21–32)
CREAT SERPL-MCNC: 0.5 MG/DL (ref 0.6–1.3)
GFR SERPL CREATININE-BSD FRML MDRD: 123 ML/MIN/1.73SQ M
GLUCOSE SERPL-MCNC: 121 MG/DL (ref 65–140)
POTASSIUM SERPL-SCNC: 3.7 MMOL/L (ref 3.5–5.3)
PROT SERPL-MCNC: 7.5 G/DL (ref 6.4–8.2)
SODIUM SERPL-SCNC: 140 MMOL/L (ref 136–145)

## 2019-07-02 PROCEDURE — 99232 SBSQ HOSP IP/OBS MODERATE 35: CPT | Performed by: PHYSICIAN ASSISTANT

## 2019-07-02 PROCEDURE — 99232 SBSQ HOSP IP/OBS MODERATE 35: CPT | Performed by: FAMILY MEDICINE

## 2019-07-02 PROCEDURE — 95951 HB EEG MONITORING/VIDEORECORD: CPT

## 2019-07-02 PROCEDURE — 80053 COMPREHEN METABOLIC PANEL: CPT | Performed by: PHYSICIAN ASSISTANT

## 2019-07-02 PROCEDURE — 82140 ASSAY OF AMMONIA: CPT | Performed by: PHYSICIAN ASSISTANT

## 2019-07-02 PROCEDURE — 95951 PR EEG MONITORING/VIDEORECORD: CPT | Performed by: PSYCHIATRY & NEUROLOGY

## 2019-07-02 PROCEDURE — 80201 ASSAY OF TOPIRAMATE: CPT | Performed by: PHYSICIAN ASSISTANT

## 2019-07-02 RX ADMIN — LEVETIRACETAM 1000 MG: 100 SOLUTION ORAL at 06:07

## 2019-07-02 RX ADMIN — CLOBAZAM 5 MG: 10 TABLET ORAL at 09:15

## 2019-07-02 RX ADMIN — LACTOBACILLUS ACIDOPHILUS / LACTOBACILLUS BULGARICUS 1 PACKET: 100 MILLION CFU STRENGTH GRANULES at 17:25

## 2019-07-02 RX ADMIN — RUFINAMIDE 1600 MG: 400 TABLET, FILM COATED ORAL at 09:11

## 2019-07-02 RX ADMIN — MELATONIN 6 MG: 3 TAB ORAL at 21:12

## 2019-07-02 RX ADMIN — TOPIRAMATE 250 MG: 100 TABLET, FILM COATED ORAL at 21:11

## 2019-07-02 RX ADMIN — LACTOBACILLUS ACIDOPHILUS / LACTOBACILLUS BULGARICUS 1 PACKET: 100 MILLION CFU STRENGTH GRANULES at 09:10

## 2019-07-02 RX ADMIN — LACTOBACILLUS ACIDOPHILUS / LACTOBACILLUS BULGARICUS 1 PACKET: 100 MILLION CFU STRENGTH GRANULES at 12:30

## 2019-07-02 RX ADMIN — RUFINAMIDE 1600 MG: 400 TABLET, FILM COATED ORAL at 17:25

## 2019-07-02 RX ADMIN — CLOBAZAM 5 MG: 10 TABLET ORAL at 21:10

## 2019-07-02 RX ADMIN — MULTIVITAMIN 5 ML: LIQUID ORAL at 09:10

## 2019-07-02 RX ADMIN — LEVETIRACETAM 1000 MG: 100 SOLUTION ORAL at 21:12

## 2019-07-02 RX ADMIN — ENOXAPARIN SODIUM 30 MG: 30 INJECTION SUBCUTANEOUS at 09:10

## 2019-07-02 RX ADMIN — LEVETIRACETAM 1000 MG: 100 SOLUTION ORAL at 14:16

## 2019-07-02 RX ADMIN — TOPIRAMATE 250 MG: 100 TABLET, FILM COATED ORAL at 09:11

## 2019-07-02 NOTE — ASSESSMENT & PLAN NOTE
· Ongoing seizures  · Continue antiepileptic regimen per Neurology  · Plan for discharge tomorrow if the seizures remained stable

## 2019-07-02 NOTE — PROGRESS NOTES
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  Lennox-Gastaut with intractable tonic seizures:  -her seizures typically occur during sleep   -she had multiple tonic seizures while on continuous video EEG monitoring and has had several changes made to her medications during this admission:  She is currently on Epidiolex 400 mg q 12 hours, Keppra 1 g q 8 hours, perampanel  2 mg HS, rufinamide 1600 mg b i d , topiramate 250 mg b i d , and Onfi 5 mg b i d  -tube feedings were changed to Glucerna with lower carbohydrate content and higher protein and fat content   -she remains on continuous video EEG monitoring   -she was noted to have elevation in ammonia with even low doses of valproic acid  -LFTs slightly elevated today as compared to 06/30/2019   -ammonia normal at 34   -topiramate level pending     -Patient appears less sedated today as compared to yesterday  Plan:  -Will d/c perampanel  Continue other AEDs as listed above   -Plan to monitor patient on vEEG through tomorrow morning   -Will check on status of Epidiolex and ONFI prescription authorizations with case management  Discussed with Dr Sofie Flanagan  Subjective:   No seizures observed during examination  She continues to be somnolent, though less so when compared to yesterday's exam     ROS:  Unable to obtain as patient nonverbal     Vitals: Blood pressure 108/66, pulse 80, temperature (!) 97 °F (36 1 °C), resp  rate 18, height 5' 3" (1 6 m), weight 53 2 kg (117 lb 4 6 oz), SpO2 99 %  ,Body mass index is 20 78 kg/m²  Physical Exam:   General:  Patient resting comfortably in bed appears to be in no acute distress  Neurologic:  Patient is somnolent but alerts to touch    Does not verbalize but able to follow some simple commands, such as right hand  and opening her eyes, wiggling her toes  Quickly falls back asleep when left unstimulated  Lab, Imaging and other studies:   BMP/CMP:   Results from last 7 days   Lab Units 07/02/19  0125 06/30/19  0542 06/29/19  0657 06/28/19  0501   SODIUM mmol/L 140  --  137 137   POTASSIUM mmol/L 3 7  --  4 0 3 9   CHLORIDE mmol/L 109*  --  108 109*   CO2 mmol/L 25  --  24 20*   BUN mg/dL 13  --  10 7   CREATININE mg/dL 0 50*  --  0 49* 0 46*   CALCIUM mg/dL 9 6  --  8 9 8 6   AST U/L 82* 75* 70* 53*   ALT U/L 94* 75 67 54   ALK PHOS U/L 166* 165* 160* 161*   EGFR ml/min/1 73sq m 123  --  124 127   , Ammonia:   Results from last 7 days   Lab Units 07/02/19  0125 06/30/19  0542 06/27/19  0905   AMMONIA umol/L 34 58* 26     VTE Prophylaxis: Sequential compression device (Venodyne)     Counseling / Coordination of Care  Total time spent today 25 minutes  Greater than 50% of total time was spent with the patient and / or family counseling and / or coordination of care  A description of the counseling / coordination of care: Discussion with epileptologist and case management, nursing

## 2019-07-02 NOTE — TRANSPORTATION MEDICAL NECESSITY
Section I - General Information    Name of Patient: Cesar Willett                 : 1980    Medicare #: [id number]  Transport Date: 2019 (PCS is valid for round trips on this date and for all repetitive trips in the 60-day range as noted below )  Origin[facility]                                                         [facility]  Is the pt's stay covered under Medicare Part A (PPS/DRG)   [x]     Closest appropriate facility? If no, why is transport to more distant facility required? Yes  If hospice pt, is this transport related to pt's terminal illness? No       Section II - Medical Necessity Questionnaire  Ambulance transportation is medically necessary only if other means of transport are contraindicated or would be potentially harmful to the patient  To meet this requirement, the patient must either be "bed confined" or suffer from a condition such that transport by means other than ambulance is contraindicated by the patient's condition  The following questions must be answered by the medical professional signing below for this form to be valid:    1)  Describe the MEDICAL CONDITION (physical and/or mental) of this patient AT [address] [place] [address] that requires the patient to be transported in an ambulance and why transport by other means is contraindicated by the patient's condition: Bed confined, incomprehensible speech, unable to communicate needs, non ambulatory, fall risk, seizure, hx of MRSA, anoxic brain injury    2) Is the patient "bed confined" as defined below? Yes  To be "be confined" the patient must satisfy all three of the following conditions: (1) unable to get up from bed without Assistance; AND (2) unable to ambulate; AND (3) unable to sit in a chair or wheelchair  3) Can this patient safely be transported by car or wheelchair van (i e , seated during transport without a medical attendant or monitoring)? No    4) In addition to completing questions 1-3 above, please check any of the following conditions that apply*:   *Note: supporting documentation for any boxes checked must be maintained in the patient's medical records  If hosp-hosp transfer, describe services needed at 2nd facility not available at 1st facility? Medical attendant required   Special handling/isolation/infection control precautions required   Unable to tolerate seated position for time needed to transport   Other(specify) Fall Risk, unable to communicate needs      Section III - Signature of Physician or Healthcare Professional  I certify that the above information is true and correct based on my evaluation of this patient, and represent that the patient requires transport by ambulance and that other forms of transport are contraindicated  I understand that this information will be used by the Centers for Medicare and Medicaid Services (CMS) to support the determination of medical necessity for ambulance services, and I represent that I have personal knowledge of the patient's condition at time of transport  [x]  If this box is checked, I also certify that the patient is physically or mentally incapable of signing the ambulance service's claim and that the institution with which I am affiliated has furnished care, services, or assistance to the patient  My signature below is made on behalf of the patient pursuant to 42 CFR §424 36(b)(4)  In accordance with 42 CFR §424 37, the specific reason(s) that the patient is physically or mentally incapable of signing the claim form is as follows: pt confused        Signature of Physician* or Healthcare Professional______________________________________________________________  Signature Date 07/02/19 (For scheduled repetitive transports, this form is not valid for transports performed more than 60 days after this date)    Printed Name & Credentials of Physician or Healthcare Professional (DO MORALES, RN, etc )____Li Perez__________  *Form must be signed by patient's attending physician for scheduled, repetitive transports   For non-repetitive, unscheduled ambulance transports, if unable to obtain the signature of the attending physician, any of the following may sign (choose appropriate option below)  [] Physician Assistant []  Clinical Nurse Specialist [x]  Registered Nurse  []  Nurse Practitioner  [x] Discharge Planner

## 2019-07-02 NOTE — ASSESSMENT & PLAN NOTE
Consulted dietary  · Continue tube feeds, free water flushes  · Elevate head of bed, aspiration precuations

## 2019-07-02 NOTE — PLAN OF CARE
Problem: Potential for Falls  Goal: Patient will remain free of falls  Description  INTERVENTIONS:  - Assess patient frequently for physical needs  -  Identify cognitive and physical deficits and behaviors that affect risk of falls    -  [place] fall precautions as indicated by assessment   - Educate patient/family on patient safety including physical limitations  - Instruct patient to call for assistance with activity based on assessment  - Modify environment to reduce risk of injury  - Consider OT/PT consult to assist with strengthening/mobility  7/2/2019 0009 by Latesha Stapleton RN  Outcome: Progressing  7/1/2019 1734 by Latesha Stapleton RN  Outcome: Progressing     Problem: Prexisting or High Potential for Compromised Skin Integrity  Goal: Skin integrity is maintained or improved  Description  INTERVENTIONS:  - Identify patients at risk for skin breakdown  - Assess and monitor skin integrity  - Assess and monitor nutrition and hydration status  - Monitor labs (i e  albumin)  - Assess for incontinence   - Turn and reposition patient  - Assist with mobility/ambulation  - Relieve pressure over bony prominences  - Avoid friction and shearing  - Provide appropriate hygiene as needed including keeping skin clean and dry  - Evaluate need for skin moisturizer/barrier cream  - Collaborate with interdisciplinary team (i e  Nutrition, Rehabilitation, etc )   - Patient/family teaching  7/2/2019 0009 by Latesha Stapleton RN  Outcome: Progressing  7/1/2019 1734 by Latseha Stapleton RN  Outcome: Progressing     Problem: SAFETY ADULT  Goal: Maintain or return to baseline ADL function  Description  INTERVENTIONS:  -  Assess patient's ability to carry out ADLs; assess patient's baseline for ADL function and identify physical deficits which impact ability to perform ADLs (bathing, care of mouth/teeth, toileting, grooming, dressing, etc )  - Assess/evaluate cause of self-care deficits   - Assess range of motion  - Assess patient's mobility; develop plan if impaired  - Assess patient's need for assistive devices and provide as appropriate  - Encourage maximum independence but intervene and supervise when necessary  ¯ Involve family in performance of ADLs  ¯ Assess for home care needs following discharge   ¯ Request OT consult to assist with ADL evaluation and planning for discharge  ¯ Provide patient education as appropriate  7/2/2019 0009 by Donnie Garcia RN  Outcome: Progressing  7/1/2019 1734 by Donnie Garcia RN  Outcome: Progressing  Goal: Maintain or return mobility status to optimal level  Description  INTERVENTIONS:  - Assess patient's baseline mobility status (ambulation, transfers, stairs, etc )    - Identify cognitive and physical deficits and behaviors that affect mobility  - Identify mobility aids required to assist with transfers and/or ambulation (gait belt, sit-to-stand, lift, walker, cane, etc )  - [place] fall precautions as indicated by assessment  - Record patient progress and toleration of activity level on Mobility SBAR; progress patient to next Phase/Stage  - Instruct patient to call for assistance with activity based on assessment  - Request Rehabilitation consult to assist with strengthening/weightbearing, etc   7/2/2019 0009 by Donnie Garcia RN  Outcome: Progressing  7/1/2019 1734 by Donnie Garcia RN  Outcome: Progressing     Problem: DISCHARGE PLANNING  Goal: Discharge to home or other facility with appropriate resources  Description  INTERVENTIONS:  - Identify barriers to discharge w/patient and caregiver  - Arrange for needed discharge resources and transportation as appropriate  - Identify discharge learning needs (meds, wound care, etc )  - Arrange for interpretive services to assist at discharge as needed  - Refer to Case Management Department for coordinating discharge planning if the patient needs post-hospital services based on physician/advanced practitioner order or complex needs related to functional status, cognitive ability, or social support system  7/2/2019 0009 by Zachary Alberts RN  Outcome: Progressing  7/1/2019 1734 by Zachary Alberts RN  Outcome: Progressing     Problem: Knowledge Deficit  Goal: Patient/family/caregiver demonstrates understanding of disease process, treatment plan, medications, and discharge instructions  Description  Complete learning assessment and assess knowledge base  Interventions:  - Provide teaching at level of understanding  - Provide teaching via preferred learning methods  7/2/2019 0009 by Zachary Alberts RN  Outcome: Progressing  7/1/2019 1734 by Zachary Alberts RN  Outcome: Progressing     Problem: NEUROSENSORY - ADULT  Goal: Achieves stable or improved neurological status  Description  INTERVENTIONS  - Monitor and report changes in neurological status  - Initiate measures to prevent increased intracranial pressure  - Maintain blood pressure and fluid volume within ordered parameters to optimize cerebral perfusion  - Monitor temperature, glucose, and sodium or any other associated labs   Initiate appropriate interventions as ordered  - Monitor for seizure activity   - Administer anti-seizure medications as ordered  7/2/2019 0009 by Zachary Alberts RN  Outcome: Progressing  7/1/2019 1734 by Zachary Alberts RN  Outcome: Progressing  Goal: Absence of seizures  Description  INTERVENTIONS  - Monitor for seizure activity  - Administer anti-seizure medications as ordered  - Monitor neurological status  7/2/2019 0009 by Zachary Alberts RN  Outcome: Progressing  7/1/2019 1734 by Zachary Alberts RN  Outcome: Progressing  Goal: Remains free of injury related to seizures activity  Description  INTERVENTIONS  - Maintain airway, patient safety  and administer oxygen as ordered  - Monitor patient for seizure activity, document and report duration and description of seizure to physician/advanced practitioner  - If seizure occurs,  ensure patient safety during seizure  - Reorient patient post seizure  - Seizure pads on all 4 side rails  - Instruct patient/family to notify RN of any seizure activity including if an aura is experienced  - Instruct patient/family to call for assistance with activity based on nursing assessment  - Administer anti-seizure medications as ordered     7/2/2019 0009 by Radha Choudhary RN  Outcome: Progressing  7/1/2019 1734 by Radha Choudhary RN  Outcome: Progressing  Goal: Achieves maximal functionality and self care  Description  INTERVENTIONS  - Monitor swallowing and airway patency with patient fatigue and changes in neurological status  - Encourage and assist patient to increase activity and self care with guidance from rehab services  - Encourage visually impaired, hearing impaired and aphasic patients to use assistive/communication devices  7/2/2019 0009 by Radha Choudhary RN  Outcome: Progressing  7/1/2019 1734 by Radha Choudhary RN  Outcome: Progressing     Problem: Nutrition/Hydration-ADULT  Goal: Nutrient/Hydration intake appropriate for improving, restoring or maintaining nutritional needs  Description  Monitor and assess patient's nutrition/hydration status for malnutrition (ex- brittle hair, bruises, dry skin, pale skin and conjunctiva, muscle wasting, smooth red tongue, and disorientation)  Collaborate with interdisciplinary team and initiate plan and interventions as ordered  Monitor patient's weight and dietary intake as ordered or per policy  Utilize nutrition screening tool and intervene per policy  Determine patient's food preferences and provide high-protein, high-caloric foods as appropriate       INTERVENTIONS:  - Monitor oral intake, urinary output, labs, and treatment plans  - Assess nutrition and hydration status and recommend course of action  - Evaluate amount of meals eaten  - Assist patient with eating if necessary   - Allow adequate time for meals  - Recommend/ encourage appropriate diets, oral nutritional supplements, and vitamin/mineral supplements  - Order, calculate, and assess calorie counts as needed  - Recommend, monitor, and adjust tube feedings and TPN/PPN based on assessed needs  - Assess need for intravenous fluids  - Provide specific nutrition/hydration education as appropriate  - Include patient/family/caregiver in decisions related to nutrition  7/2/2019 0009 by Karly Schmidt, RN  Outcome: Progressing  7/1/2019 1734 by Karly Schmidt, RN  Outcome: Progressing

## 2019-07-02 NOTE — PLAN OF CARE
Problem: Potential for Falls  Goal: Patient will remain free of falls  Description  INTERVENTIONS:  - Assess patient frequently for physical needs  -  Identify cognitive and physical deficits and behaviors that affect risk of falls    -  [place] fall precautions as indicated by assessment   - Educate patient/family on patient safety including physical limitations  - Instruct patient to call for assistance with activity based on assessment  - Modify environment to reduce risk of injury  - Consider OT/PT consult to assist with strengthening/mobility  Outcome: Progressing     Problem: Prexisting or High Potential for Compromised Skin Integrity  Goal: Skin integrity is maintained or improved  Description  INTERVENTIONS:  - Identify patients at risk for skin breakdown  - Assess and monitor skin integrity  - Assess and monitor nutrition and hydration status  - Monitor labs (i e  albumin)  - Assess for incontinence   - Turn and reposition patient  - Assist with mobility/ambulation  - Relieve pressure over bony prominences  - Avoid friction and shearing  - Provide appropriate hygiene as needed including keeping skin clean and dry  - Evaluate need for skin moisturizer/barrier cream  - Collaborate with interdisciplinary team (i e  Nutrition, Rehabilitation, etc )   - Patient/family teaching  Outcome: Progressing     Problem: SAFETY ADULT  Goal: Maintain or return to baseline ADL function  Description  INTERVENTIONS:  -  Assess patient's ability to carry out ADLs; assess patient's baseline for ADL function and identify physical deficits which impact ability to perform ADLs (bathing, care of mouth/teeth, toileting, grooming, dressing, etc )  - Assess/evaluate cause of self-care deficits   - Assess range of motion  - Assess patient's mobility; develop plan if impaired  - Assess patient's need for assistive devices and provide as appropriate  - Encourage maximum independence but intervene and supervise when necessary  ¯ Involve family in performance of ADLs  ¯ Assess for home care needs following discharge   ¯ Request OT consult to assist with ADL evaluation and planning for discharge  ¯ Provide patient education as appropriate  Outcome: Progressing  Goal: Maintain or return mobility status to optimal level  Description  INTERVENTIONS:  - Assess patient's baseline mobility status (ambulation, transfers, stairs, etc )    - Identify cognitive and physical deficits and behaviors that affect mobility  - Identify mobility aids required to assist with transfers and/or ambulation (gait belt, sit-to-stand, lift, walker, cane, etc )  - [place] fall precautions as indicated by assessment  - Record patient progress and toleration of activity level on Mobility SBAR; progress patient to next Phase/Stage  - Instruct patient to call for assistance with activity based on assessment  - Request Rehabilitation consult to assist with strengthening/weightbearing, etc   Outcome: Progressing     Problem: DISCHARGE PLANNING  Goal: Discharge to home or other facility with appropriate resources  Description  INTERVENTIONS:  - Identify barriers to discharge w/patient and caregiver  - Arrange for needed discharge resources and transportation as appropriate  - Identify discharge learning needs (meds, wound care, etc )  - Arrange for interpretive services to assist at discharge as needed  - Refer to Case Management Department for coordinating discharge planning if the patient needs post-hospital services based on physician/advanced practitioner order or complex needs related to functional status, cognitive ability, or social support system  Outcome: Progressing     Problem: Knowledge Deficit  Goal: Patient/family/caregiver demonstrates understanding of disease process, treatment plan, medications, and discharge instructions  Description  Complete learning assessment and assess knowledge base    Interventions:  - Provide teaching at level of understanding  - Provide teaching via preferred learning methods  Outcome: Progressing     Problem: NEUROSENSORY - ADULT  Goal: Achieves stable or improved neurological status  Description  INTERVENTIONS  - Monitor and report changes in neurological status  - Initiate measures to prevent increased intracranial pressure  - Maintain blood pressure and fluid volume within ordered parameters to optimize cerebral perfusion  - Monitor temperature, glucose, and sodium or any other associated labs   Initiate appropriate interventions as ordered  - Monitor for seizure activity   - Administer anti-seizure medications as ordered  Outcome: Progressing  Goal: Absence of seizures  Description  INTERVENTIONS  - Monitor for seizure activity  - Administer anti-seizure medications as ordered  - Monitor neurological status  Outcome: Progressing  Goal: Remains free of injury related to seizures activity  Description  INTERVENTIONS  - Maintain airway, patient safety  and administer oxygen as ordered  - Monitor patient for seizure activity, document and report duration and description of seizure to physician/advanced practitioner  - If seizure occurs,  ensure patient safety during seizure  - Reorient patient post seizure  - Seizure pads on all 4 side rails  - Instruct patient/family to notify RN of any seizure activity including if an aura is experienced  - Instruct patient/family to call for assistance with activity based on nursing assessment  - Administer anti-seizure medications as ordered     Outcome: Progressing  Goal: Achieves maximal functionality and self care  Description  INTERVENTIONS  - Monitor swallowing and airway patency with patient fatigue and changes in neurological status  - Encourage and assist patient to increase activity and self care with guidance from rehab services  - Encourage visually impaired, hearing impaired and aphasic patients to use assistive/communication devices  Outcome: Progressing     Problem: Nutrition/Hydration-ADULT  Goal: Nutrient/Hydration intake appropriate for improving, restoring or maintaining nutritional needs  Description  Monitor and assess patient's nutrition/hydration status for malnutrition (ex- brittle hair, bruises, dry skin, pale skin and conjunctiva, muscle wasting, smooth red tongue, and disorientation)  Collaborate with interdisciplinary team and initiate plan and interventions as ordered  Monitor patient's weight and dietary intake as ordered or per policy  Utilize nutrition screening tool and intervene per policy  Determine patient's food preferences and provide high-protein, high-caloric foods as appropriate       INTERVENTIONS:  - Monitor oral intake, urinary output, labs, and treatment plans  - Assess nutrition and hydration status and recommend course of action  - Evaluate amount of meals eaten  - Assist patient with eating if necessary   - Allow adequate time for meals  - Recommend/ encourage appropriate diets, oral nutritional supplements, and vitamin/mineral supplements  - Order, calculate, and assess calorie counts as needed  - Recommend, monitor, and adjust tube feedings and TPN/PPN based on assessed needs  - Assess need for intravenous fluids  - Provide specific nutrition/hydration education as appropriate  - Include patient/family/caregiver in decisions related to nutrition  Outcome: Progressing

## 2019-07-02 NOTE — PROGRESS NOTES
Progress Note - Jesica Guzman 1980, 46 y o  female MRN: [id number]    Unit/Bed#: [facility] 685-87 Encounter: [id number]    Primary Care Provider: Jacky Kerr MD   Date and time admitted to hospital: 2019  6:00 PM        * Lennox-Gastaut syndrome with tonic seizures ([address] )  Assessment & Plan  · Ongoing seizures  · Continue antiepileptic regimen per Neurology  · Plan for discharge tomorrow if the seizures remained stable    S/P placement of VNS (vagus nerve stimulation) device  Assessment & Plan  Neurology following     Transaminitis  Assessment & Plan  · Up trending  · Monitor for now    Hyperammonemia (HCC)  Assessment & Plan  · Monitor levels    Intellectual disability  Assessment & Plan  Supportive care  One-to-one observation  Turn q 2 hours    Underweight  Assessment & Plan  Consulted dietary  · Continue tube feeds, free water flushes  · Elevate head of bed, aspiration precuations       VTE Pharmacologic Prophylaxis:   Pharmacologic: Enoxaparin (Lovenox)  Mechanical VTE Prophylaxis in Place: Yes    Patient Centered Rounds: I have performed bedside rounds with nursing staff today  Discussions with Specialists or Other Care Team Provider:     Education and Discussions with Family / Patient:     Time Spent for Care: 30 minutes  More than 50% of total time spent on counseling and coordination of care as described above  Current Length of Stay: 5 day(s)    Current Patient Status: Inpatient   Certification Statement: The patient will continue to require additional inpatient hospital stay due to intractable seizures and management of antiepileptic medication    Discharge Plan:  Likely DC tomorrow if remained stable    Code Status: Level 1 - Full Code      Subjective:   Patient seen and examined  No events overnight per nursing  Reported that seizure is improving      Objective:     Vitals:   Temp (24hrs), Av 1 °F (36 7 °C), Min:97 °F (36 1 °C), Max:99 1 °F (37 3 °C)    Temp:  [97 °F (36 1 °C)-99 1 °F (37 3 °C)] 97 °F (36 1 °C)  HR:  [] 80  Resp:  [18] 18  BP: (108-117)/(66-77) 108/66  SpO2:  [94 %-99 %] 99 %  Body mass index is 20 78 kg/m²  Input and Output Summary (last 24 hours): Intake/Output Summary (Last 24 hours) at 7/2/2019 1037  Last data filed at 7/2/2019 0900  Gross per 24 hour   Intake 972 ml   Output 1731 ml   Net -759 ml       Physical Exam:     Physical Exam   Constitutional: No distress  HENT:   Head: Normocephalic  Cardiovascular: Normal rate  No murmur heard  Pulmonary/Chest:   Decreased breath sounds bilateral   Abdominal: Soft  She exhibits no distension  Musculoskeletal: She exhibits no edema  Neurological:   Do not open eyes to voice  Was not following commands   Skin: Skin is warm  Additional Data:     Labs:    Results from last 7 days   Lab Units 06/29/19  0657   WBC Thousand/uL 7 84   HEMOGLOBIN g/dL 12 3   HEMATOCRIT % 38 4   PLATELETS Thousands/uL 219   NEUTROS PCT % 52   LYMPHS PCT % 33   MONOS PCT % 12   EOS PCT % 2     Results from last 7 days   Lab Units 07/02/19  0125   POTASSIUM mmol/L 3 7   CHLORIDE mmol/L 109*   CO2 mmol/L 25   BUN mg/dL 13   CREATININE mg/dL 0 50*   CALCIUM mg/dL 9 6   ALK PHOS U/L 166*   ALT U/L 94*   AST U/L 82*     Results from last 7 days   Lab Units 06/27/19  0905   INR  1 10       * I Have Reviewed All Lab Data Listed Above  * Additional Pertinent Lab Tests Reviewed:  [facility]    Imaging:    Imaging Reports Reviewed Today Include:  Chest x-ray  Imaging Personally Reviewed by Myself Includes:      Recent Cultures (last 7 days):           Last 24 Hours Medication List:     Current Facility-Administered Medications:  acetaminophen 650 mg Per G Tube Q6H PRN Brian Block MD   bisacodyl 10 mg Rectal HS PRN Brian Block MD   Cannabidiol 400 mg Per G Tube BID Collin Cooks, MD   cloBAZam 5 mg Per G Tube Q12H Collin Cooks, MD   enoxaparin 30 mg Subcutaneous Q24H [facility] Alex Fuller MD   lactobacillus acidophilus-bulgaricus 1 packet Per PEG Tube TID With Meals Burke Montoya MD   levETIRAcetam 1,000 mg Per G Tube Q8H Nancy Mujica MD   LORazepam 2 mg Intravenous Q5 Min PRN Burke Montoya MD   melatonin 6 mg Per G Tube HS Burke Montoya MD   multivitamin with iron-minerals 5 mL Per G Tube Daily Burke Montoya MD   ondansetron 4 mg Intravenous Q6H PRN Burke Montoya MD   perampanel 2 mg Per OG Tube HS Nancy Mujica MD   rufinamide 1,600 mg Per G Tube BID Burke Montoya MD   topiramate 250 mg Per Zen Michaels MD        Today, Patient Was Seen By: Chandni Fagan MD    ** Please Note: Dictation voice to text software may have been used in the creation of this document   **

## 2019-07-03 VITALS
RESPIRATION RATE: 17 BRPM | SYSTOLIC BLOOD PRESSURE: 120 MMHG | HEIGHT: 63 IN | HEART RATE: 80 BPM | WEIGHT: 117.28 LBS | TEMPERATURE: 97.9 F | DIASTOLIC BLOOD PRESSURE: 69 MMHG | BODY MASS INDEX: 20.78 KG/M2 | OXYGEN SATURATION: 96 %

## 2019-07-03 LAB — TOPIRAMATE SERPL-MCNC: 16.5 UG/ML (ref 2–25)

## 2019-07-03 PROCEDURE — 99232 SBSQ HOSP IP/OBS MODERATE 35: CPT | Performed by: PHYSICIAN ASSISTANT

## 2019-07-03 PROCEDURE — 99239 HOSP IP/OBS DSCHRG MGMT >30: CPT | Performed by: FAMILY MEDICINE

## 2019-07-03 PROCEDURE — 95951 PR EEG MONITORING/VIDEORECORD: CPT | Performed by: PSYCHIATRY & NEUROLOGY

## 2019-07-03 RX ORDER — LEVETIRACETAM 100 MG/ML
1000 SOLUTION ORAL EVERY 8 HOURS
Qty: 473 ML | Refills: 0
Start: 2019-07-03 | End: 2019-08-22 | Stop reason: SDUPTHER

## 2019-07-03 RX ADMIN — ENOXAPARIN SODIUM 30 MG: 30 INJECTION SUBCUTANEOUS at 09:45

## 2019-07-03 RX ADMIN — LEVETIRACETAM 1000 MG: 100 SOLUTION ORAL at 14:07

## 2019-07-03 RX ADMIN — MULTIVITAMIN 5 ML: LIQUID ORAL at 09:47

## 2019-07-03 RX ADMIN — LEVETIRACETAM 1000 MG: 100 SOLUTION ORAL at 05:48

## 2019-07-03 RX ADMIN — RUFINAMIDE 1600 MG: 400 TABLET, FILM COATED ORAL at 09:44

## 2019-07-03 RX ADMIN — LACTOBACILLUS ACIDOPHILUS / LACTOBACILLUS BULGARICUS 1 PACKET: 100 MILLION CFU STRENGTH GRANULES at 14:06

## 2019-07-03 RX ADMIN — CLOBAZAM 5 MG: 10 TABLET ORAL at 09:41

## 2019-07-03 RX ADMIN — LACTOBACILLUS ACIDOPHILUS / LACTOBACILLUS BULGARICUS 1 PACKET: 100 MILLION CFU STRENGTH GRANULES at 09:46

## 2019-07-03 RX ADMIN — TOPIRAMATE 250 MG: 100 TABLET, FILM COATED ORAL at 09:45

## 2019-07-03 NOTE — SOCIAL WORK
CM was informed by Sienna at [facility] new scripts were received; Fifi Acosta state she spoke with DON and they were able to receive medications  [facility]  CM notified pt's bedside RN Graham Fabian and Marilyn Lennon at [facility] of dc time  Message left for pt's mother Mike Ruano to inform of dc time  Facility transfer form and CMN completed  Chart copy requested  CMN on chart  CMN faxed to [facility]

## 2019-07-03 NOTE — PROGRESS NOTES
Progress Note - Neurology   Jannie Varghese 46 y o  female MRN: [id number]  Unit/Bed#: [facility] 591-17 Encounter: [id number]    Assessment:  28-year-old female with past medical history of Lennox-Gastaut syndrome with associated intractable seizure disorder  Lennox-Gastaut with intractable tonic seizures:  -her seizures typically occur during sleep   -she had multiple tonic seizures while on continuous video EEG monitoring and has had several changes made to her medications during this admission:  She is currently on Epidiolex 400 mg q 12 hours, Keppra 1 g q 8 hours, rufinamide 1600 mg b i d , topiramate 250 mg b i d , and Onfi 5 mg b i d  -tube feedings were changed to Glucerna with lower carbohydrate content and higher protein and fat content   -now off video EEG monitoring   -topiramate level pending  Plan is to discharge back to her nursing facility this afternoon  Dr Sun Sharpe will provide a video disc demonstrating Maira's seizure semiology for her nursing facility staff to take with them  Continue on AED regimen as listed above and she will follow with her regular outpatient epileptologist, Dr Efe Winkler  Subjective:   Patient now off video EEG monitoring as of this morning  More awake today  ROS:  Unable to obtain as patient nonverbal     Vitals: Blood pressure 102/70, pulse 89, temperature 97 6 °F (36 4 °C), resp  rate 18, height 5' 3" (1 6 m), weight 53 2 kg (117 lb 4 6 oz), SpO2 96 %  ,Body mass index is 20 78 kg/m²  Physical Exam:   General:  Patient appears comfortable, lying in bed  HEENT:  EEG leads have been removed  Neurologic:  Patient is alert but does not interact with the examiner  Does not verbalize  Does not follow simple requests  EOMs intact without nystagmus  Spontaneously moving all 4 extremities  Lab, Imaging and other studies: I have personally reviewed pertinent reports      VTE Prophylaxis: Enoxaparin (Lovenox)    Counseling / Coordination of Care  Total time spent today 25 minutes  Greater than 50% of total time was spent with the patient and / or family counseling and / or coordination of care  A description of the counseling / coordination of care: Discussed with Epileptology

## 2019-07-05 ENCOUNTER — TELEPHONE (OUTPATIENT)
Dept: NEUROLOGY | Facility: CLINIC | Age: 39
End: 2019-07-05

## 2019-07-05 DIAGNOSIS — R74.01 TRANSAMINITIS: ICD-10-CM

## 2019-07-05 DIAGNOSIS — G40.019 PARTIAL IDIOPATHIC EPILEPSY WITH SEIZURES OF LOCALIZED ONSET, INTRACTABLE, WITHOUT STATUS EPILEPTICUS (HCC): Primary | ICD-10-CM

## 2019-07-05 LAB — RUFINAMIDE SERPL-MCNC: 29.1 UG/ML

## 2019-07-05 NOTE — PROGRESS NOTES
Ordering labs for next follow-up visit  She was in hospital started [facility] due to seizures having a response to benzodiazepine, but she has tried this before causing excessive somnolence  Will try a low dose but need to monitor for clozabam and N-desmethylclobazam levels    Unexplained low topiramate level on day of admission but on recheck it was above prior historical levels when given in the hospital

## 2019-07-05 NOTE — TELEPHONE ENCOUNTER
[facility] called regarding faxes needed prior to appt with Dr Anahi Pierce   Faxed labs per request to [phone]

## 2019-07-05 NOTE — DISCHARGE SUMMARY
Discharge Summary - [facility] Internal Medicine    Patient Information: Linnea Boles 46 y o  female MRN: [id number]  Unit/Bed#: [facility] 560-79 Encounter: [id number]    Discharging Physician / Practitioner: Yemi Monsalve MD  PCP: Granville Saint, MD  Admission Date: 6/27/2019  Discharge Date: 7 /3/19  Disposition:     Other: Skilled nursing facility in [place]    Reason for Admission:  Intractable seizures    Discharge Diagnoses:     Principal Problem:    Lennox-Gastaut syndrome with tonic seizures ([address] )  Active Problems:    Underweight    Intellectual disability    Hyperammonemia (HCC)    Transaminitis    S/P placement of VNS (vagus nerve stimulation) device  Resolved Problems:    * No resolved hospital problems  *      Consultations During Hospital Stay:  · Neurology    Procedures Performed:   · Video EEG monitor    Significant Findings / Test Results:     Chest x-ray-no acute cardiopulmonary disease    Incidental Findings:   ·     Test Results Pending at Discharge (will require follow up): Outpatient Tests Requested:  ·     Complications:  none    Hospital Course:     Linnea Boles is a 46 y o  female patient who originally presented to the hospital on 6/27/2019 due to intractable seizures patient with known history of Lennox-Gastaut syndrome with associated intractable seizure disorder  Patient was brought to the hospital for increasing seizure activity  Patient was monitored on the video EEG monitoring  Neurology followed the patient and her antiepileptic regimen was changed  She is currently on Epidiolex 400 mg q 12 hours, Keppra 1 g q 8 hours, rufinamide 1600 mg b i d , topiramate 250 mg b i d , and Onfi 5 mg b i d  [employer] Also her tube feeding was changed to Glucerna with lower carbohydrate content and care protein and fat content  Patient seizure activity improved  And cleared by Neurology for discharge with outpatient follow-up  All other chronic conditions remained stable    Patient was continued on the tube feeding  Patient was discharged in a stable condition back to the [facility] nursing facility on 7/4/2019  For details refer to the chart  Condition at Discharge: stable     Discharge Day Visit / Exam:     Subjective:  No significant change overnight  Seizure activity is improving  Vitals: Blood Pressure: 120/69 (07/03/19 2204)  Pulse: 80 (07/03/19 2204)  Temperature: 97 9 °F (36 6 °C) (07/03/19 2204)  Temp Source: Oral (07/02/19 1456)  Respirations: 17 (07/03/19 2204)  Height: 5' 3" (160 cm) (06/28/19 1127)  Weight - Scale: 53 2 kg (117 lb 4 6 oz) (07/02/19 0900)  SpO2: 96 % (07/03/19 2204)  Exam:   Physical Exam   Constitutional: No distress  HENT:   Head: Normocephalic  Eyes: Left eye exhibits no discharge  Neck: No JVD present  Cardiovascular: Normal rate  Exam reveals no friction rub  No murmur heard  Pulmonary/Chest: No respiratory distress  Decreased breath sounds bilateral   Abdominal: Soft  She exhibits no distension  There is no tenderness  Peg tube   Musculoskeletal: She exhibits no edema  Neurological: No cranial nerve deficit  Opens eyes to voice, do not follow commands   Skin: Skin is warm  Discussion with Family:  Called mother, left voicemail    Discharge instructions/Information to patient and family:   See after visit summary for information provided to patient and family  Provisions for Follow-Up Care:  See after visit summary for information related to follow-up care and any pertinent home health orders  Planned Readmission:      Discharge Statement:  I spent  45 minutes discharging the patient  This time was spent on the day of discharge  I had direct contact with the patient on the day of discharge  Greater than 50% of the total time was spent examining patient, answering all patient questions, arranging and discussing plan of care with patient as well as directly providing post-discharge instructions    Additional time then spent on discharge activities  Discharge Medications:  See after visit summary for reconciled discharge medications provided to patient and family        ** Please Note: This note has been constructed using a voice recognition system **

## 2019-07-09 ENCOUNTER — TELEPHONE (OUTPATIENT)
Dept: NEUROLOGY | Facility: CLINIC | Age: 39
End: 2019-07-09

## 2019-07-09 DIAGNOSIS — G40.812 LENNOX-GASTAUT SYNDROME WITH TONIC SEIZURES (HCC): Primary | ICD-10-CM

## 2019-07-09 DIAGNOSIS — G40.812 LENNOX-GASTAUT SYNDROME WITH TONIC SEIZURES (HCC): ICD-10-CM

## 2019-07-09 DIAGNOSIS — G40.419 OTHER GENERALIZED EPILEPSY, INTRACTABLE, WITHOUT STATUS EPILEPTICUS (HCC): ICD-10-CM

## 2019-07-09 NOTE — TELEPHONE ENCOUNTER
Received a call from [facility] requesting rx for epidiolex be faxed to them and also to specialty pharmacy  Rx had been printed at the hospital on 7/1/19, will need new rx ordered    Please print rx and fax to [facility] [address] [place] [address] pharmacy @ [phone] and also to [facility] @ [phone]

## 2019-07-09 NOTE — TELEPHONE ENCOUNTER
Chris Landon - please fax the written script to the two locations listed ([facility]) thanks

## 2019-07-24 ENCOUNTER — HOSPITAL ENCOUNTER (EMERGENCY)
Facility: HOSPITAL | Age: 39
Discharge: HOME | End: 2019-07-24
Attending: EMERGENCY MEDICINE | Admitting: EMERGENCY MEDICINE
Payer: MEDICARE

## 2019-07-24 ENCOUNTER — APPOINTMENT (EMERGENCY)
Dept: RADIOLOGY | Facility: HOSPITAL | Age: 39
End: 2019-07-24
Attending: EMERGENCY MEDICINE
Payer: MEDICARE

## 2019-07-24 VITALS
HEART RATE: 82 BPM | DIASTOLIC BLOOD PRESSURE: 72 MMHG | OXYGEN SATURATION: 97 % | TEMPERATURE: 98.3 F | RESPIRATION RATE: 20 BRPM | SYSTOLIC BLOOD PRESSURE: 111 MMHG | BODY MASS INDEX: 18.63 KG/M2 | WEIGHT: 105.16 LBS | HEIGHT: 63 IN

## 2019-07-24 DIAGNOSIS — Z46.59 ENCOUNTER FOR FEEDING TUBE PLACEMENT: ICD-10-CM

## 2019-07-24 DIAGNOSIS — T85.528A DISLODGED GASTROSTOMY TUBE: Primary | ICD-10-CM

## 2019-07-24 PROCEDURE — 96372 THER/PROPH/DIAG INJ SC/IM: CPT

## 2019-07-24 PROCEDURE — 49450 REPLACE G/C TUBE PERC: CPT | Performed by: RADIOLOGY

## 2019-07-24 PROCEDURE — C1729 CATH, DRAINAGE: HCPCS

## 2019-07-24 PROCEDURE — 99284 EMERGENCY DEPT VISIT MOD MDM: CPT

## 2019-07-24 PROCEDURE — C1769 GUIDE WIRE: HCPCS

## 2019-07-24 PROCEDURE — 96365 THER/PROPH/DIAG IV INF INIT: CPT

## 2019-07-24 PROCEDURE — 99283 EMERGENCY DEPT VISIT LOW MDM: CPT | Performed by: PHYSICIAN ASSISTANT

## 2019-07-24 PROCEDURE — 49451 REPLACE DUOD/JEJ TUBE PERC: CPT

## 2019-07-24 PROCEDURE — 96375 TX/PRO/DX INJ NEW DRUG ADDON: CPT

## 2019-07-24 PROCEDURE — 43762 RPLC GTUBE NO REVJ TRC: CPT | Performed by: EMERGENCY MEDICINE

## 2019-07-24 PROCEDURE — 99285 EMERGENCY DEPT VISIT HI MDM: CPT | Performed by: EMERGENCY MEDICINE

## 2019-07-24 RX ORDER — MIDAZOLAM HYDROCHLORIDE 1 MG/ML
1 INJECTION INTRAMUSCULAR; INTRAVENOUS ONCE
Status: COMPLETED | OUTPATIENT
Start: 2019-07-24 | End: 2019-07-24

## 2019-07-24 RX ORDER — HALOPERIDOL 5 MG/ML
INJECTION INTRAMUSCULAR
Status: DISCONTINUED
Start: 2019-07-24 | End: 2019-07-24 | Stop reason: WASHOUT

## 2019-07-24 RX ORDER — LORAZEPAM 2 MG/ML
1 INJECTION INTRAMUSCULAR ONCE
Status: COMPLETED | OUTPATIENT
Start: 2019-07-24 | End: 2019-07-24

## 2019-07-24 RX ADMIN — MIDAZOLAM 1 MG: 1 INJECTION INTRAMUSCULAR; INTRAVENOUS at 14:48

## 2019-07-24 RX ADMIN — LORAZEPAM 1 MG: 2 INJECTION INTRAMUSCULAR; INTRAVENOUS at 16:33

## 2019-07-24 RX ADMIN — LEVETIRACETAM 1000 MG: 100 INJECTION, SOLUTION INTRAVENOUS at 16:38

## 2019-07-24 RX ADMIN — IOHEXOL 20 ML: 300 INJECTION, SOLUTION INTRAVENOUS at 17:44

## 2019-07-24 NOTE — SEDATION DOCUMENTATION
DR Dilan Mcfadden EXCHANGED G TUBE TO A 18FR MULTIPURPOSE DRAIN  WILL NEED TO BE SEEN FOR AN 18FR J TUBE INSERTION AT A LATER DATE     18FR X 25CM  REF ULT 18 0 -[id number]-[facility]  LOT [id number]

## 2019-07-24 NOTE — ED PROVIDER NOTES
History  Chief Complaint   Patient presents with    Feeding Tube Problem     Peg tube displacement      47 y/o female with noted PMH presents to ED from nursing home for displacement of PEG tube approximately 30 minutes prior to ED arrival: patient pulled out PEG tube with balloon fully inflated, as she has done previously and been seen in this ED multiple times for same  Most recent tube size was 16 F PEG tube--this was reportedly working normally until this morning  All hx obtained from medical records/nursing home as patient is nonverbal at baseline  Most recent such displacement of tube was in 2019 for which patient was seen in this ED  A/p:  Displacement of previously working PEG tube with no other issue with tube prior to removal   Will attempt replacement now and return to nursing home  History provided by:  Medical records and nursing home  Feeding Tube Problem       Prior to Admission Medications   Prescriptions Last Dose Informant Patient Reported? Taking?    Cannabidiol (EPIDIOLEX) 100 MG/ML SOLN   No No   Si mg by Per G Tube route 2 (two) times a day   LORazepam (ATIVAN) 0 5 mg tablet   Yes No   Sig: Take by mouth as needed for anxiety Give 1 tab prior to dental   MELATONIN PO  Outside Facility (Specify) Yes No   Si mg by Per G Tube route daily at bedtime   Multiple Vitamins-Minerals (MULTIVITAMIN WITH IRON-MINERALS) liquid  Outside Facility (Specify) Yes No   Si mL by Per G Tube route daily   Probiotic Product (ALEXANDER-BID PROBIOTIC PO)  Outside Facility (Specify) Yes No   Si tablet by Per G Tube route daily     acetaminophen (TYLENOL) 325 mg tablet  Outside Facility (Specify) Yes No   Si mg by Per G Tube route every 6 (six) hours as needed for mild pain    bisacodyl (BISCOLAX) 10 mg suppository  Outside Facility (Specify) Yes No   Sig: Insert 10 mg into the rectum daily at bedtime as needed for constipation (if no BM day #4)   cloBAZam (ONFI) 10 MG tablet   No No Si 5 tablets (5 mg total) by Per G Tube route every 12 (twelve) hours   levETIRAcetam (KEPPRA) 100 mg/mL oral solution   No No   Sig: 10 mL (1,000 mg total) by Per G Tube route every 8 (eight) hours   levOCARNitine (CARNITOR) 1 g/10 mL solution  Outside Facility (Specify) No No   Si 5 mL (750 mg total) by Per G Tube route 3 (three) times a day   magnesium hydroxide (MILK OF MAGNESIA) 400 mg/5 mL oral suspension  Outside Facility (Specify) Yes No   Sig: Take 30 mL by mouth daily as needed for constipation    rufinamide (BANZEL) 400 mg tablet  Outside Facility (Specify) No No   Si tablets (1,600 mg total) by Per G Tube route 2 (two) times a day   topiramate (TOPAMAX) 50 MG tablet  Outside Facility (Specify) No No   Si tablets (250 mg total) by Per G Tube route every 12 (twelve) hours      Facility-Administered Medications: None       Past Medical History:   Diagnosis Date    ADHD     Anoxic brain damage (HCC)     Autistic disorder     Hyperammonemia (HCC)     Hyperkeratosis     Hypotension     Intellectual disability     Intellectual disability     Lennox-Gastaut syndrome with tonic seizures (HCC)     Lethargy     Liver enzyme elevation     Onychomycosis     Osteoporosis     Osteoporosis     Psychiatric disorder     anxiety       Past Surgical History:   Procedure Laterality Date    ABDOMINAL SURGERY      CARDIAC PACEMAKER PLACEMENT      vns implant l chest    IR THORACENTESIS  2018    JEJUNOSTOMY FEEDING TUBE      PEG TUBE PLACEMENT         History reviewed  No pertinent family history  I have reviewed and agree with the history as documented      Social History     Tobacco Use    Smoking status: Never Smoker    Smokeless tobacco: Never Used   Substance Use Topics    Alcohol use: Not Currently    Drug use: No        Review of Systems   Unable to perform ROS: Patient nonverbal       Physical Exam  Physical Exam   Constitutional: She appears well-developed and well-nourished  She is uncooperative  No distress  Awake/nonverbal  Intellectually disabled  Does not follow commands   HENT:   Head: Normocephalic and atraumatic  Right Ear: External ear normal    Left Ear: External ear normal    Nose: Nose normal    Mouth/Throat: Oropharynx is clear and moist  Mucous membranes are dry (dry/inssipated secretions)  No oropharyngeal exudate  Neck: Trachea normal and normal range of motion  Neck supple  No JVD present  No tracheal tenderness, no spinous process tenderness and no muscular tenderness present  No tracheal deviation present  No thyroid mass and no thyromegaly present  Cardiovascular: Regular rhythm, S1 normal, S2 normal, normal heart sounds and intact distal pulses  Tachycardia present  Exam reveals no gallop and no friction rub  No murmur heard  Pulses:       Radial pulses are 2+ on the right side, and 2+ on the left side  Dorsalis pedis pulses are 2+ on the right side, and 2+ on the left side  Posterior tibial pulses are 2+ on the right side, and 2+ on the left side  Pulmonary/Chest: Effort normal and breath sounds normal  No stridor  No respiratory distress  She has no decreased breath sounds  She has no wheezes  She has no rhonchi  She has no rales  She exhibits no tenderness  Abdominal: Soft  She exhibits no distension and no mass  There is no tenderness  There is no rigidity, no rebound, no guarding and no CVA tenderness  Scaphoid/nondistended  PEG tube site in LUQ with no marginal erythema/induration  No drainage/discharge from site   Musculoskeletal: Normal range of motion  She exhibits no edema, tenderness or deformity  Lymphadenopathy:     She has no cervical adenopathy  Neurological: She is alert  She displays no tremor  She exhibits normal muscle tone  She displays no seizure activity  GCS eye subscore is 4  GCS verbal subscore is 1  GCS motor subscore is 5     ACEVEDO x4 with equal tone  Unable to assess sensation/cerebellar testing   Skin: Skin is warm, dry and intact  No rash noted  She is not diaphoretic  No erythema  Nursing note and vitals reviewed  Vital Signs  ED Triage Vitals [07/24/19 1238]   Temperature Pulse Respirations Blood Pressure SpO2   98 5 °F (36 9 °C) (!) 125 20 118/89 98 %      Temp Source Heart Rate Source Patient Position - Orthostatic VS BP Location FiO2 (%)   Temporal Monitor Lying Left arm --      Pain Score       No Pain           Vitals:    07/24/19 1238 07/24/19 1630 07/24/19 1814   BP: 118/89  111/72   Pulse: (!) 125 98 82   Patient Position - Orthostatic VS: Lying Lying Lying         Visual Acuity      ED Medications  Medications   midazolam (VERSED) injection 1 mg (1 mg Intramuscular Given 7/24/19 1448)   levETIRAcetam (KEPPRA) 1,000 mg in sodium chloride 0 9 % 100 mL IVPB (0 mg Intravenous Stopped 7/24/19 1658)   LORazepam (ATIVAN) 2 mg/mL injection 1 mg (1 mg Intravenous Given 7/24/19 1633)   iohexol (OMNIPAQUE) 300 mg/mL injection 50 mL (20 mL Intravenous Given 7/24/19 1744)       Diagnostic Studies  Results Reviewed     None                 IR other    (Results Pending)              Procedures  PEG tube replacement  Date/Time: 7/24/2019 12:50 PM  Performed by: Joanie Marquez DO  Authorized by: Joanie Marquez DO     Patient location:  ED  Assisting Provider(s): Yes (comment)    Consent:     Consent obtained:  Emergent situation  Indications:     Indications:  Replacement of PEG tube for feeding/medication administration  Pre-procedure details:     Skin preparation:  Betadine    Preparation: Patient was prepped and draped in the usual sterile fashion    Procedure Detail:     Equipment used:  12 F and 15 F PEG tube; 12 F and 10 F Woods catheter  Procedure note (site, laterality, method, findings):   Initial attempt was made at replacement of the PEG tube with and equally sized 16 [place] citizen PEG tube with use of sterile lubricant; this was unsuccessful, as was a similar attempt with a 14 [place] PEG tube   A 16 [place] catheter was similarly unable to be placed into the tract, although a 10 [place] catheter was able to be placed and secured in place  The 10 [place] catheter unfortunately did not have a balloon that could be inflated to secure it  It was therefore taped in place with sterile dressing and abdominal binder to prevent patient displacing this catheter with plan to consult with GI regarding replacement  ED Course  ED Course as of Jul 24 1816 Wed Jul 24, 2019   1344 After multiple attempts at replacement of PEG tube, I contacted the GI PA Rafael Rodriguez for assistance at this point  1413 Reply from Rafael Rodriguez: will come to ED after office patients are seen and evaluate patient  If unable to replace, will require admission and endoscopic replacement  [address] unable to replace tube after several attempts; the previously placed 10F Woods catheter was replaced into the existing tract  She is discussing with her attending:  suspects the patient will require transfer for endoscopic replacement tomorrow  No GI coverage available at this facility tomorrow  200 PA recommends transfer to [facility] or [place] for EGD-guided replacement of PEG tube tomorrow  Transfer order was placed in epic       [phone] D/w Sugar HADDAD for GI  Patient case discussed: she is happy to see patient when transferred but requests admission to [facility] service  [id number] Dr Elaine Álvarez of [address] [place] [address]  Requests that I d/w general surgery prior to accepting transfer  Page to general surgery via   200 D/w Dr Yesenia Carrera of general surgery  Patient case discussed: he recommends I d/w IR regarding endoscopically-guided PEG replacement which may be able to be done tomorrow  [address] [facility] Dr Flori Fletcher of IR currently in procedure; will contact ED when completed        1635 IV lorazepam given to promote relaxation/cooperation while IV levetiracetam infusion ongoing: no seizure activity is present at present  [address] [facility],[address] Dr Liana Hedrick in ED: has reviewed case  Requesting IR consult be placed in Epic       1722 Patient taken to IR suite for tube replacement procedure  1748 Successful placement of 18 Fr multipurpose drain with Luer-lock connector  Informed by Dr Liana Hedrick that patient will require another appt (promptly) for moderate sedation for replacement of tube with definitive PEG tube  EMS ETA 45 minutes  Discharge information includes instructions regarding care/use of drain and the need for prompt f/u for definitive PEG replacement  Contact information for [facility] IR department provided with discharge information  MDM  Number of Diagnoses or Management Options  Dislodged gastrostomy tube [facility]): new and requires workup  Encounter for feeding tube placement: new and requires workup     Amount and/or Complexity of Data Reviewed  Decide to obtain previous medical records or to obtain history from someone other than the patient: yes  Review and summarize past medical records: yes  Discuss the patient with other providers: yes    Risk of Complications, Morbidity, and/or Mortality  Presenting problems: moderate  Diagnostic procedures: low  Management options: high    Patient Progress  Patient progress: improved      Disposition  Final diagnoses:   Dislodged gastrostomy tube ([address] )   Encounter for feeding tube placement     Time reflects when diagnosis was documented in both MDM as applicable and the Disposition within this note     Time User Action Codes Description Comment    7/24/2019  3:25 PM Prince Enio Burns [Z43 1] Dislodged gastrostomy tube ([address] )     7/24/2019  3:25 PM Prince Enio Burns [O32 36] Encounter for feeding tube placement       ED Disposition     ED Disposition Condition Date/Time Comment    Discharge Stable Wed Jul 24, 2019  6:00 PM Aroldo Rivers should be discharged and returned to her nursing home          Follow-up Information     Follow up With Specialties Details Why [address] Interventional Radiology Department  Call in 1 day Ms Tata Calvin will need another appointment within seven days for placement of a PEG tube  This will need to be done with sedation  Please call as soon as possible to schedule this appointment  [address] [facility]          Patient's Medications   Discharge Prescriptions    No medications on file     No discharge procedures on file      ED Provider  Electronically Signed by           Vu Estrella DO  07/24/19 2029

## 2019-07-24 NOTE — CONSULTS
Consultation - [facility]  Brittani Dike 46 y o  female MRN: [id number]  Unit/Bed#: YP54 Encounter: [id number]        Inpatient consult to gastroenterology  Consult performed by: Ondina Aguirre PA-C  Consult ordered by: Ondina Aguirre PA-C          ASSESSMENT/ PLAN:    45-year-old female with a past medical history of lennox-gastaut syndrome with tonic seizures, intellectual disability, with longstanding PEG tube who presented to the emergency room from nursing home for PEG tube displacement    1  Peg tube displacement:   Per ED notes, patient removed PEG tube 30 minutes prior to arrival with the balloon fully inflated  Unfortunately unsuccessful replacement of 16 Western Antonia and 14 Lebanese PEG tube (original size is 16 Fr)  GI was consulted and also unsuccessful at placement  10 Lebanese Nguyen was inserted into the tract to hold it open  Patient will require endoscopic peg placement with possible dilation, which will be done at [facility] or [facility]  -keep 10 Fr nguyen inserted to keep tract open  -transfer to [facility] or [facility] for endoscopic placement of feeding tube tomorrow   -NPO  -discussed with ED staff    Reason for Consult / Principal Problem:   Displaced PEG tube    HPI:   Zaki Mclaughlin is a 45-year-old female with past medical history lennox-gastaut syndrome with tonic seizures, intellectual disability, ADHD, enoxaparin damage, autistic disorder, longstanding PEG tube who presented to the emergency room from nursing home for PEG tube dysfunction  Patient is unable to provide a history given her intellectual disability  History was taken from the chart and ED notes  Per the chart the patient's PEG tube was removed 30 minutes prior to arrival   She removed the PEG tube with the balloon fully inflated  ED and GI both attempted bedside peg exchange and were unsuccessful     Patient will require transfer to [address] [id number] or [facility] for endoscopic placement    REVIEW OF SYSTEMS:   ROS unable to be obtained secondary to intellectual disability       Historical Information   Past Medical History:   Diagnosis Date    ADHD     Anoxic brain damage (HCC)     Autistic disorder     Hyperammonemia (HCC)     Hyperkeratosis     Hypotension     Intellectual disability     Intellectual disability     Lennox-Gastaut syndrome with tonic seizures (HCC)     Lethargy     Liver enzyme elevation     Onychomycosis     Osteoporosis     Osteoporosis     Psychiatric disorder     anxiety     Past Surgical History:   Procedure Laterality Date    ABDOMINAL SURGERY      CARDIAC PACEMAKER PLACEMENT      vns implant l chest    IR THORACENTESIS  12/17/2018    JEJUNOSTOMY FEEDING TUBE      PEG TUBE PLACEMENT       Social History   Social History     Substance and Sexual Activity   Alcohol Use Not Currently     Social History     Substance and Sexual Activity   Drug Use No     Social History     Tobacco Use   Smoking Status Never Smoker   Smokeless Tobacco Never Used     History reviewed  No pertinent family history  Meds/Allergies       (Not in a hospital admission)  Current Facility-Administered Medications   Medication Dose Route Frequency    levETIRAcetam (KEPPRA) 1,000 mg in sodium chloride 0 9 % 100 mL IVPB  1,000 mg Intravenous Once       Allergies   Allergen Reactions    Felbamate            Objective     Blood pressure 118/89, pulse (!) 125, temperature 99 3 °F (37 4 °C), temperature source Rectal, resp  rate 20, height 5' 3" (1 6 m), weight 47 7 kg (105 lb 2 6 oz), SpO2 98 %  No intake or output data in the 24 hours ending 07/24/19 1534      PHYSICAL EXAM:      General Appearance:   Alert, no verbal response    HEENT:   Normocephalic, atraumatic  Right eye exhibits no discharge  Left eye exhibits no discharge   No scleral icterus            Neck:  Supple, symmetrical, trachea midline, no stridor   Lungs:   Clear to auscultation bilaterally; no rales, rhonchi or wheezing    Heart[de-identified]   S1 and S2 normal; regular rate and rhythm; no murmur, rub, or gallop  Abdomen:   Soft, non-tender, non-distended; normal bowel sounds; no masses, no organomegaly, previous site of gastrostomy noted with Woods inserted    Genitalia:   Deferred    Rectal:   Deferred    Extremities:  No cyanosis, clubbing or edema        Skin:  Skin color, texture, turgor normal, no rashes or lesions          Lab Results:   No visits with results within 1 Day(s) from this visit     Latest known visit with results is:   Admission on 06/27/2019, Discharged on 07/03/2019   Component Date Value    WBC 06/28/2019 8 01     RBC 06/28/2019 3 75*    Hemoglobin 06/28/2019 11 9     Hematocrit 06/28/2019 37 6     MCV 06/28/2019 100*    MCH 06/28/2019 31 7     MCHC 06/28/2019 31 6     RDW 06/28/2019 12 4     MPV 06/28/2019 11 9     Platelets 87/11/1023 207     nRBC 06/28/2019 0     Neutrophils Relative 06/28/2019 55     Immat GRANS % 06/28/2019 0     Lymphocytes Relative 06/28/2019 33     Monocytes Relative 06/28/2019 9     Eosinophils Relative 06/28/2019 2     Basophils Relative 06/28/2019 1     Neutrophils Absolute 06/28/2019 4 45     Immature Grans Absolute 06/28/2019 0 02     Lymphocytes Absolute 06/28/2019 2 64     Monocytes Absolute 06/28/2019 0 73     Eosinophils Absolute 06/28/2019 0 13     Basophils Absolute 06/28/2019 0 04     Sodium 06/28/2019 137     Potassium 06/28/2019 3 9     Chloride 06/28/2019 109*    CO2 06/28/2019 20*    ANION GAP 06/28/2019 8     BUN 06/28/2019 7     Creatinine 06/28/2019 0 46*    Glucose 06/28/2019 146*    Calcium 06/28/2019 8 6     AST 06/28/2019 53*    ALT 06/28/2019 54     Alkaline Phosphatase 06/28/2019 161*    Total Protein 06/28/2019 6 9     Albumin 06/28/2019 3 0*    Total Bilirubin 06/28/2019 0 31     eGFR 06/28/2019 127     Rufinamide (Banzel) 06/29/2019 29 1     Sodium 06/29/2019 137     Potassium 06/29/2019 4 0     Chloride 06/29/2019 108     CO2 06/29/2019 24     ANION GAP 06/29/2019 5     BUN 06/29/2019 10     Creatinine 06/29/2019 0 49*    Glucose 06/29/2019 127     Calcium 06/29/2019 8 9     AST 06/29/2019 70*    ALT 06/29/2019 67     Alkaline Phosphatase 06/29/2019 160*    Total Protein 06/29/2019 7 2     Albumin 06/29/2019 3 3*    Total Bilirubin 06/29/2019 0 35     eGFR 06/29/2019 124     WBC 06/29/2019 7 84     RBC 06/29/2019 3 85     Hemoglobin 06/29/2019 12 3     Hematocrit 06/29/2019 38 4     MCV 06/29/2019 100*    MCH 06/29/2019 31 9     MCHC 06/29/2019 32 0     RDW 06/29/2019 12 3     MPV 06/29/2019 11 8     Platelets 41/31/2860 219     nRBC 06/29/2019 0     Neutrophils Relative 06/29/2019 52     Immat GRANS % 06/29/2019 0     Lymphocytes Relative 06/29/2019 33     Monocytes Relative 06/29/2019 12     Eosinophils Relative 06/29/2019 2     Basophils Relative 06/29/2019 1     Neutrophils Absolute 06/29/2019 4 08     Immature Grans Absolute 06/29/2019 0 01     Lymphocytes Absolute 06/29/2019 2 60     Monocytes Absolute 06/29/2019 0 92     Eosinophils Absolute 06/29/2019 0 16     Basophils Absolute 06/29/2019 0 07     Magnesium 06/29/2019 2 2     Phosphorus 06/29/2019 3 4     Ammonia 06/30/2019 58*    Total Bilirubin 06/30/2019 0 40     Bilirubin, Direct 06/30/2019 0 09     Alkaline Phosphatase 06/30/2019 165*    AST 06/30/2019 75*    ALT 06/30/2019 75     Total Protein 06/30/2019 7 3     Albumin 06/30/2019 3 3*    Topiramate Lvl 07/02/2019 16 5     Sodium 07/02/2019 140     Potassium 07/02/2019 3 7     Chloride 07/02/2019 109*    CO2 07/02/2019 25     ANION GAP 07/02/2019 6     BUN 07/02/2019 13     Creatinine 07/02/2019 0 50*    Glucose 07/02/2019 121     Calcium 07/02/2019 9 6     AST 07/02/2019 82*    ALT 07/02/2019 94*    Alkaline Phosphatase 07/02/2019 166*    Total Protein 07/02/2019 7 5     Albumin 07/02/2019 3 5     Total Bilirubin 07/02/2019 0 49     eGFR 07/02/2019 123     Ammonia 07/02/2019 34 Imaging Studies: I have personally reviewed pertinent imaging studies  No results found

## 2019-07-24 NOTE — DISCHARGE INSTRUCTIONS
A temporary 18 Western Antonia multi-purpose drain has been placed instead of a PEG tube, which was unable to be placed despite repeated attempts  This drain does not have a retention balloon at the end of it, so do not pull on it or it will come out entirely  The drain works by way of a Luer lock connection; several syringes have been provided that will connect to it for medication administration  Please call either of the numbers provided to schedule another appointment  Angelica Rizvi will require another appointment ideally within seven days for replacement of her PEG tube  This will need to be done with IV moderate sedation  Please call with any questions  Thank you

## 2019-07-24 NOTE — ED NOTES
KELBY paged at this time for assistance with replacement of PEG tube       Yunior German, ZEE  07/24/19 5803

## 2019-07-24 NOTE — ED NOTES
[facility] notified of patients status and that she will be picked up to return to their facility in about 1/2 hour        Celina Montero RN  07/24/19 9677

## 2019-07-25 NOTE — QUICK NOTE
I received a call from [facility] at approximately 2000 regarding the patient's feeding tube  The patient's nurse stated that she was unable to administer to feedings via the multi-purpose drain that had been placed by Interventional Radiology as it was not compatible with any of their kangaroo connect tubing  This was the case even after removing the Luer-Lock connector  She was able to administer medications without difficulty via the tubing with the Luer-Lock in place  I had the nursing supervisor discuss this matter with the RN from [facility]  Despite extensive research on her part regarding potential compatibility between devices and possible connectors between devices, no solution could be ascertained to this problem  [facility] indicated that feeds would be held and the patient returned to the ED in the morning for additional intervention

## 2019-07-26 ENCOUNTER — APPOINTMENT (EMERGENCY)
Dept: CT IMAGING | Facility: HOSPITAL | Age: 39
End: 2019-07-26
Payer: MEDICARE

## 2019-07-26 ENCOUNTER — HOSPITAL ENCOUNTER (EMERGENCY)
Facility: HOSPITAL | Age: 39
Discharge: HOME | End: 2019-07-27
Attending: EMERGENCY MEDICINE | Admitting: EMERGENCY MEDICINE
Payer: MEDICARE

## 2019-07-26 ENCOUNTER — APPOINTMENT (EMERGENCY)
Dept: RADIOLOGY | Facility: HOSPITAL | Age: 39
End: 2019-07-26
Payer: MEDICARE

## 2019-07-26 DIAGNOSIS — K94.23 PEG TUBE MALFUNCTION (HCC): Primary | ICD-10-CM

## 2019-07-26 DIAGNOSIS — R14.0 ABDOMINAL DISTENSION: ICD-10-CM

## 2019-07-26 LAB
ALBUMIN SERPL BCP-MCNC: 4.1 G/DL (ref 3.5–5)
ALP SERPL-CCNC: 148 U/L (ref 46–116)
ALT SERPL W P-5'-P-CCNC: 59 U/L (ref 12–78)
ANION GAP SERPL CALCULATED.3IONS-SCNC: 12 MMOL/L (ref 4–13)
AST SERPL W P-5'-P-CCNC: 38 U/L (ref 5–45)
BASOPHILS # BLD AUTO: 0.09 THOUSANDS/ΜL (ref 0–0.1)
BASOPHILS NFR BLD AUTO: 1 % (ref 0–1)
BILIRUB SERPL-MCNC: 0.4 MG/DL (ref 0.2–1)
BUN SERPL-MCNC: 15 MG/DL (ref 5–25)
CALCIUM SERPL-MCNC: 9.8 MG/DL (ref 8.3–10.1)
CHLORIDE SERPL-SCNC: 107 MMOL/L (ref 100–108)
CO2 SERPL-SCNC: 24 MMOL/L (ref 21–32)
CREAT SERPL-MCNC: 0.58 MG/DL (ref 0.6–1.3)
EOSINOPHIL # BLD AUTO: 0.15 THOUSAND/ΜL (ref 0–0.61)
EOSINOPHIL NFR BLD AUTO: 2 % (ref 0–6)
ERYTHROCYTE [DISTWIDTH] IN BLOOD BY AUTOMATED COUNT: 12.2 % (ref 11.6–15.1)
GFR SERPL CREATININE-BSD FRML MDRD: 117 ML/MIN/1.73SQ M
GLUCOSE SERPL-MCNC: 112 MG/DL (ref 65–140)
HCT VFR BLD AUTO: 41.5 % (ref 34.8–46.1)
HGB BLD-MCNC: 13.5 G/DL (ref 11.5–15.4)
IMM GRANULOCYTES # BLD AUTO: 0.03 THOUSAND/UL (ref 0–0.2)
IMM GRANULOCYTES NFR BLD AUTO: 0 % (ref 0–2)
LIPASE SERPL-CCNC: 410 U/L (ref 73–393)
LYMPHOCYTES # BLD AUTO: 3.04 THOUSANDS/ΜL (ref 0.6–4.47)
LYMPHOCYTES NFR BLD AUTO: 31 % (ref 14–44)
MCH RBC QN AUTO: 32.3 PG (ref 26.8–34.3)
MCHC RBC AUTO-ENTMCNC: 32.5 G/DL (ref 31.4–37.4)
MCV RBC AUTO: 99 FL (ref 82–98)
MONOCYTES # BLD AUTO: 0.84 THOUSAND/ΜL (ref 0.17–1.22)
MONOCYTES NFR BLD AUTO: 9 % (ref 4–12)
NEUTROPHILS # BLD AUTO: 5.56 THOUSANDS/ΜL (ref 1.85–7.62)
NEUTS SEG NFR BLD AUTO: 57 % (ref 43–75)
NRBC BLD AUTO-RTO: 0 /100 WBCS
PLATELET # BLD AUTO: 306 THOUSANDS/UL (ref 149–390)
PMV BLD AUTO: 10.8 FL (ref 8.9–12.7)
POTASSIUM SERPL-SCNC: 3.6 MMOL/L (ref 3.5–5.3)
PROT SERPL-MCNC: 8.5 G/DL (ref 6.4–8.2)
RBC # BLD AUTO: 4.18 MILLION/UL (ref 3.81–5.12)
SODIUM SERPL-SCNC: 143 MMOL/L (ref 136–145)
WBC # BLD AUTO: 9.71 THOUSAND/UL (ref 4.31–10.16)

## 2019-07-26 PROCEDURE — 85025 COMPLETE CBC W/AUTO DIFF WBC: CPT | Performed by: EMERGENCY MEDICINE

## 2019-07-26 PROCEDURE — 74177 CT ABD & PELVIS W/CONTRAST: CPT

## 2019-07-26 PROCEDURE — 80053 COMPREHEN METABOLIC PANEL: CPT | Performed by: EMERGENCY MEDICINE

## 2019-07-26 PROCEDURE — 74018 RADEX ABDOMEN 1 VIEW: CPT

## 2019-07-26 PROCEDURE — 99285 EMERGENCY DEPT VISIT HI MDM: CPT

## 2019-07-26 PROCEDURE — 99284 EMERGENCY DEPT VISIT MOD MDM: CPT | Performed by: EMERGENCY MEDICINE

## 2019-07-26 PROCEDURE — 83690 ASSAY OF LIPASE: CPT | Performed by: EMERGENCY MEDICINE

## 2019-07-26 RX ADMIN — IOHEXOL 100 ML: 350 INJECTION, SOLUTION INTRAVENOUS at 21:53

## 2019-07-27 VITALS
RESPIRATION RATE: 18 BRPM | OXYGEN SATURATION: 96 % | BODY MASS INDEX: 18.36 KG/M2 | DIASTOLIC BLOOD PRESSURE: 64 MMHG | TEMPERATURE: 97 F | HEIGHT: 63 IN | HEART RATE: 96 BPM | SYSTOLIC BLOOD PRESSURE: 122 MMHG | WEIGHT: 103.62 LBS

## 2019-07-27 NOTE — ED PROVIDER NOTES
History  Chief Complaint   Patient presents with    GI Problem     Patient came in via ems from [facility] that patient has pulled out her GI tube, has no bowel sounds and upper quadrant distention     This is a 73-year-old female with a history of Lennox-Gastaut syndrome with tonic seizures, intellectual disability, protein calorie malnutrition status post PEG placement who presents with possible PEG tube displacement  Per report from the home, the patient started to pull out her PEG to tonight so they are unsure if it is still in the correct place  EMS also gave report that the patient's abdomen was distended and did not have any bowel sounds  Unable to obtain a review of systems secondary to baseline mental status  PEG tube appeared in place  Did not need to reposition the PEG tube  Instilled 45 cc of Omnipaque into the PEG tube and shot a KUB  PEG tube in proper position  Prior to Admission Medications   Prescriptions Last Dose Informant Patient Reported? Taking?    Cannabidiol (EPIDIOLEX) 100 MG/ML SOLN   No Yes   Si mg by Per G Tube route 2 (two) times a day   LORazepam (ATIVAN) 0 5 mg tablet   Yes Yes   Sig: Take by mouth as needed for anxiety Give 1 tab prior to dental   MELATONIN PO  Outside Facility (Specify) Yes Yes   Si mg by Per G Tube route daily at bedtime   Multiple Vitamins-Minerals (MULTIVITAMIN WITH IRON-MINERALS) liquid  Outside Facility (Specify) Yes Yes   Si mL by Per G Tube route daily   Probiotic Product (ALEXANDER-BID PROBIOTIC PO)  Outside Facility (Specify) Yes Yes   Si tablet by Per G Tube route daily     acetaminophen (TYLENOL) 325 mg tablet  Outside Facility (Specify) Yes Yes   Si mg by Per G Tube route every 6 (six) hours as needed for mild pain    bisacodyl (BISCOLAX) 10 mg suppository  Outside Facility (Specify) Yes Yes   Sig: Insert 10 mg into the rectum daily at bedtime as needed for constipation (if no BM day #4)   cloBAZam (ONFI) 10 MG tablet   No Yes   Si 5 tablets (5 mg total) by Per G Tube route every 12 (twelve) hours   levETIRAcetam (KEPPRA) 100 mg/mL oral solution   No Yes   Sig: 10 mL (1,000 mg total) by Per G Tube route every 8 (eight) hours   levOCARNitine (CARNITOR) 1 g/10 mL solution  Outside Facility (Specify) No Yes   Si 5 mL (750 mg total) by Per G Tube route 3 (three) times a day   magnesium hydroxide (MILK OF MAGNESIA) 400 mg/5 mL oral suspension  Outside Facility (Specify) Yes Yes   Sig: Take 30 mL by mouth daily as needed for constipation    rufinamide (BANZEL) 400 mg tablet  Outside Facility (Specify) No Yes   Si tablets (1,600 mg total) by Per G Tube route 2 (two) times a day   topiramate (TOPAMAX) 50 MG tablet  Outside Facility (Specify) No Yes   Si tablets (250 mg total) by Per G Tube route every 12 (twelve) hours      Facility-Administered Medications: None       Past Medical History:   Diagnosis Date    ADHD     Anoxic brain damage (HCC)     Autistic disorder     Hyperammonemia (HCC)     Hyperkeratosis     Hypotension     Intellectual disability     Intellectual disability     Lennox-Gastaut syndrome with tonic seizures (HCC)     Lethargy     Liver enzyme elevation     Onychomycosis     Osteoporosis     Osteoporosis     Psychiatric disorder     anxiety       Past Surgical History:   Procedure Laterality Date    ABDOMINAL SURGERY      CARDIAC PACEMAKER PLACEMENT      vns implant l chest    IR THORACENTESIS  2018    JEJUNOSTOMY FEEDING TUBE      PEG TUBE PLACEMENT         History reviewed  No pertinent family history  I have reviewed and agree with the history as documented      Social History     Tobacco Use    Smoking status: Never Smoker    Smokeless tobacco: Never Used   Substance Use Topics    Alcohol use: Not Currently    Drug use: No        Review of Systems   Unable to perform ROS: Patient nonverbal       Physical Exam  Physical Exam   Constitutional: Vital signs are normal  She appears cachectic  She is cooperative  No distress  HENT:   Mouth/Throat: Uvula is midline, oropharynx is clear and moist and mucous membranes are normal    Eyes: Pupils are equal, round, and reactive to light  Conjunctivae and EOM are normal    Neck: Trachea normal  No thyroid mass and no thyromegaly present  Cardiovascular: Normal rate, regular rhythm, normal heart sounds, intact distal pulses and normal pulses  No murmur heard  Pulmonary/Chest: Effort normal and breath sounds normal    Abdominal: Soft  Normal appearance and bowel sounds are normal  There is no tenderness  There is no rebound, no guarding and no CVA tenderness  PEG tube in place in left lower quadrant  Site is clean, dry, intact  No evidence of infection  PEG tube appears to be draining gastric content  Neurological: She is alert  The patient does not follow commands but moves all extremities  Skin: Skin is warm, dry and intact  Psychiatric: She has a normal mood and affect   Her speech is normal and behavior is normal  Thought content normal        Vital Signs  ED Triage Vitals [07/26/19 2042]   Temperature Pulse Respirations Blood Pressure SpO2   (!) 97 °F (36 1 °C) 88 16 147/95 97 %      Temp Source Heart Rate Source Patient Position - Orthostatic VS BP Location FiO2 (%)   Temporal Monitor Sitting Left arm --      Pain Score       No Pain           Vitals:    07/26/19 2042   BP: 147/95   Pulse: 88   Patient Position - Orthostatic VS: Sitting         Visual Acuity      ED Medications  Medications   iohexol (OMNIPAQUE) 350 MG/ML injection (SINGLE-DOSE) 100 mL (100 mL Intravenous Given 7/26/19 2153)       Diagnostic Studies  Results Reviewed     Procedure Component Value Units Date/Time    Comprehensive metabolic panel [[id number]]  (Abnormal) Collected:  07/26/19 2106    Lab Status:  Final result Specimen:  Blood from Arm, Left Updated:  07/26/19 2127     Sodium 143 mmol/L      Potassium 3 6 mmol/L      Chloride 107 mmol/L      CO2 24 mmol/L      ANION GAP 12 mmol/L      BUN 15 mg/dL      Creatinine 0 58 mg/dL      Glucose 112 mg/dL      Calcium 9 8 mg/dL      AST 38 U/L      ALT 59 U/L      Alkaline Phosphatase 148 U/L      Total Protein 8 5 g/dL      Albumin 4 1 g/dL      Total Bilirubin 0 40 mg/dL      eGFR 117 ml/min/1 73sq m     Narrative:       National Kidney Disease Foundation guidelines for Chronic Kidney Disease (CKD):     Stage 1 with normal or high GFR (GFR > 90 mL/min/1 73 square meters)    Stage 2 Mild CKD (GFR = 60-89 mL/min/1 73 square meters)    Stage 3A Moderate CKD (GFR = 45-59 mL/min/1 73 square meters)    Stage 3B Moderate CKD (GFR = 30-44 mL/min/1 73 square meters)    Stage 4 Severe CKD (GFR = 15-29 mL/min/1 73 square meters)    Stage 5 End Stage CKD (GFR <15 mL/min/1 73 square meters)  Note: GFR calculation is accurate only with a steady state creatinine    Lipase [[id number]]  (Abnormal) Collected:  07/26/19 2106    Lab Status:  Final result Specimen:  Blood from Arm, Left Updated:  07/26/19 2122     Lipase 410 u/L     CBC and differential [[id number]]  (Abnormal) Collected:  07/26/19 2106    Lab Status:  Final result Specimen:  Blood from Arm, Left Updated:  07/26/19 2113     WBC 9 71 Thousand/uL      RBC 4 18 Million/uL      Hemoglobin 13 5 g/dL      Hematocrit 41 5 %      MCV 99 fL      MCH 32 3 pg      MCHC 32 5 g/dL      RDW 12 2 %      MPV 10 8 fL      Platelets 560 Thousands/uL      nRBC 0 /100 WBCs      Neutrophils Relative 57 %      Immat GRANS % 0 %      Lymphocytes Relative 31 %      Monocytes Relative 9 %      Eosinophils Relative 2 %      Basophils Relative 1 %      Neutrophils Absolute 5 56 Thousands/µL      Immature Grans Absolute 0 03 Thousand/uL      Lymphocytes Absolute 3 04 Thousands/µL      Monocytes Absolute 0 84 Thousand/µL      Eosinophils Absolute 0 15 Thousand/µL      Basophils Absolute 0 09 Thousands/µL                  CT abdomen pelvis with contrast   Final Result by Mirella Victoria MD (07/26 2218)      No acute pathology visualized on CT of the abdomen and pelvis with IV without oral contrast             Workstation performed: [id number]         XR abdomen 1 view kub   ED Interpretation by Shaji Talley MD (07/26 2135)   PEG tube in place as interpreted by myself  Procedures  Procedures       ED Course                               MDM  Number of Diagnoses or Management Options  Diagnosis management comments: Placement of PEG tube confirmed at bedside via KUB  Due to report of abdominal distension lack of bowel sounds, will check a CT scan abdomen/pelvis  However, my abdominal exam here is unremarkable  Positive bowel sounds  Abdomen is soft and nontender  Disposition  Final diagnoses:   PEG tube malfunction (HCC)   Abdominal distension     Time reflects when diagnosis was documented in both MDM as applicable and the Disposition within this note     Time User Action Codes Description Comment    7/26/2019 10:21 PM Jason Hacketta Add [K94 23] PEG tube malfunction ([address] )     7/26/2019 10:21 PM Jasonnubia Hacketta Add [R14 0] Abdominal distension       ED Disposition     ED Disposition Condition Date/Time Comment    Discharge Stable Fri Jul 26, 2019 10:20 PM Zully Alfred [facility]OTT[facility] discharge to home/self care  Follow-up Information     Follow up With Specialties Details Why Contact Info Additional Information    Belén Cortes MD Internal Medicine Schedule an appointment as soon as possible for a visit   [address] [place] [address] [phone]       [facility] Emergency Department Emergency Medicine Go to  If symptoms worsen [phone]  [phone] [place] ED, [address], [address]          Patient's Medications   Discharge Prescriptions    No medications on file     No discharge procedures on file      ED Provider  Electronically Signed by Danilo Driscoll MD  07/26/19 3542

## 2019-07-27 NOTE — ED NOTES
Spoke to nurse at [address] [place] [address] made aware that res is discharged with P/U time 06:45 am      Ky Mitchell RN  07/27/19 3462

## 2019-07-27 NOTE — ED NOTES
Pt has brusie purple in color on right inner upper arm close to antecubital area     Satya Colby RN  07/26/19 3337

## 2019-07-27 NOTE — ED NOTES
Patient transported to [facility] via [facility], RN  07/26/19 2348

## 2019-07-30 NOTE — PROGRESS NOTES
Copy of results of CBC, CMP and PT INR drawn on 7/26 were sent to Dr Terence Loco, whom I am covering for this week  Lab results are unremarkable  Chart reviewed  Patient has extremely refractory Lennox-Gastaut syndrome  Was admitted to [facility] recently (Discharged on 7/26) for increase in sz frequency  Per discharge summary, pt was put on videoEEG monitoring and followed by Neurology and AEDs were changed  Patient has an appointment with Dr Terence Loco for post discharge follow-up on 8/9

## 2019-07-31 ENCOUNTER — APPOINTMENT (EMERGENCY)
Dept: RADIOLOGY | Facility: HOSPITAL | Age: 39
DRG: 101 | End: 2019-07-31
Payer: MEDICARE

## 2019-07-31 ENCOUNTER — HOSPITAL ENCOUNTER (INPATIENT)
Facility: HOSPITAL | Age: 39
LOS: 1 days | Discharge: NURSING, REHAB OR LONG-TERM CARE | DRG: 101 | End: 2019-08-02
Attending: EMERGENCY MEDICINE | Admitting: INTERNAL MEDICINE
Payer: MEDICARE

## 2019-07-31 DIAGNOSIS — F79 INTELLECTUAL DISABILITY: ICD-10-CM

## 2019-07-31 DIAGNOSIS — R63.6 UNDERWEIGHT: ICD-10-CM

## 2019-07-31 DIAGNOSIS — G40.812 LENNOX-GASTAUT SYNDROME WITH TONIC SEIZURES (HCC): ICD-10-CM

## 2019-07-31 DIAGNOSIS — N30.01 ACUTE CYSTITIS WITH HEMATURIA: ICD-10-CM

## 2019-07-31 DIAGNOSIS — N63.0 BREAST MASS: ICD-10-CM

## 2019-07-31 DIAGNOSIS — Z46.59 ENCOUNTER FOR FEEDING TUBE PLACEMENT: ICD-10-CM

## 2019-07-31 DIAGNOSIS — T85.528A DISLODGED GASTROSTOMY TUBE: ICD-10-CM

## 2019-07-31 DIAGNOSIS — G40.419 OTHER GENERALIZED EPILEPSY, INTRACTABLE, WITHOUT STATUS EPILEPTICUS (HCC): ICD-10-CM

## 2019-07-31 DIAGNOSIS — K94.23 PEG TUBE MALFUNCTION (HCC): Primary | ICD-10-CM

## 2019-07-31 DIAGNOSIS — Z22.322 MRSA COLONIZATION: ICD-10-CM

## 2019-07-31 DIAGNOSIS — G40.919 BREAKTHROUGH SEIZURE (HCC): ICD-10-CM

## 2019-07-31 PROBLEM — E46 PROTEIN CALORIE MALNUTRITION (HCC): Status: ACTIVE | Noted: 2019-07-31

## 2019-07-31 LAB
ANION GAP SERPL CALCULATED.3IONS-SCNC: 13 MMOL/L (ref 4–13)
APTT PPP: 31 SECONDS (ref 23–37)
BASOPHILS # BLD AUTO: 0.06 THOUSANDS/ΜL (ref 0–0.1)
BASOPHILS NFR BLD AUTO: 1 % (ref 0–1)
BUN SERPL-MCNC: 12 MG/DL (ref 5–25)
CALCIUM SERPL-MCNC: 9.4 MG/DL (ref 8.3–10.1)
CHLORIDE SERPL-SCNC: 103 MMOL/L (ref 100–108)
CO2 SERPL-SCNC: 22 MMOL/L (ref 21–32)
CREAT SERPL-MCNC: 0.56 MG/DL (ref 0.6–1.3)
EOSINOPHIL # BLD AUTO: 0.08 THOUSAND/ΜL (ref 0–0.61)
EOSINOPHIL NFR BLD AUTO: 1 % (ref 0–6)
ERYTHROCYTE [DISTWIDTH] IN BLOOD BY AUTOMATED COUNT: 12.2 % (ref 11.6–15.1)
GFR SERPL CREATININE-BSD FRML MDRD: 119 ML/MIN/1.73SQ M
GLUCOSE SERPL-MCNC: 115 MG/DL (ref 65–140)
HCT VFR BLD AUTO: 43.7 % (ref 34.8–46.1)
HGB BLD-MCNC: 14.2 G/DL (ref 11.5–15.4)
IMM GRANULOCYTES # BLD AUTO: 0.03 THOUSAND/UL (ref 0–0.2)
IMM GRANULOCYTES NFR BLD AUTO: 0 % (ref 0–2)
INR PPP: 1.04 (ref 0.84–1.19)
LYMPHOCYTES # BLD AUTO: 2.83 THOUSANDS/ΜL (ref 0.6–4.47)
LYMPHOCYTES NFR BLD AUTO: 26 % (ref 14–44)
MCH RBC QN AUTO: 32.1 PG (ref 26.8–34.3)
MCHC RBC AUTO-ENTMCNC: 32.5 G/DL (ref 31.4–37.4)
MCV RBC AUTO: 99 FL (ref 82–98)
MONOCYTES # BLD AUTO: 1 THOUSAND/ΜL (ref 0.17–1.22)
MONOCYTES NFR BLD AUTO: 9 % (ref 4–12)
NEUTROPHILS # BLD AUTO: 6.95 THOUSANDS/ΜL (ref 1.85–7.62)
NEUTS SEG NFR BLD AUTO: 63 % (ref 43–75)
NRBC BLD AUTO-RTO: 0 /100 WBCS
PLATELET # BLD AUTO: 305 THOUSANDS/UL (ref 149–390)
PMV BLD AUTO: 10.5 FL (ref 8.9–12.7)
POTASSIUM SERPL-SCNC: 3.5 MMOL/L (ref 3.5–5.3)
PROTHROMBIN TIME: 13.6 SECONDS (ref 11.6–14.5)
RBC # BLD AUTO: 4.43 MILLION/UL (ref 3.81–5.12)
SODIUM SERPL-SCNC: 138 MMOL/L (ref 136–145)
WBC # BLD AUTO: 10.95 THOUSAND/UL (ref 4.31–10.16)

## 2019-07-31 PROCEDURE — 85730 THROMBOPLASTIN TIME PARTIAL: CPT | Performed by: EMERGENCY MEDICINE

## 2019-07-31 PROCEDURE — 36415 COLL VENOUS BLD VENIPUNCTURE: CPT | Performed by: EMERGENCY MEDICINE

## 2019-07-31 PROCEDURE — 99284 EMERGENCY DEPT VISIT MOD MDM: CPT | Performed by: EMERGENCY MEDICINE

## 2019-07-31 PROCEDURE — 85025 COMPLETE CBC W/AUTO DIFF WBC: CPT | Performed by: EMERGENCY MEDICINE

## 2019-07-31 PROCEDURE — 99285 EMERGENCY DEPT VISIT HI MDM: CPT

## 2019-07-31 PROCEDURE — 80048 BASIC METABOLIC PNL TOTAL CA: CPT | Performed by: EMERGENCY MEDICINE

## 2019-07-31 PROCEDURE — 85610 PROTHROMBIN TIME: CPT | Performed by: EMERGENCY MEDICINE

## 2019-07-31 RX ORDER — SODIUM CHLORIDE 9 MG/ML
125 INJECTION, SOLUTION INTRAVENOUS CONTINUOUS
Status: DISCONTINUED | OUTPATIENT
Start: 2019-07-31 | End: 2019-08-01

## 2019-07-31 RX ADMIN — SODIUM CHLORIDE 125 ML/HR: 0.9 INJECTION, SOLUTION INTRAVENOUS at 23:14

## 2019-08-01 ENCOUNTER — APPOINTMENT (OUTPATIENT)
Dept: RADIOLOGY | Facility: HOSPITAL | Age: 39
DRG: 101 | End: 2019-08-01
Payer: MEDICARE

## 2019-08-01 PROBLEM — G40.919 BREAKTHROUGH SEIZURE (HCC): Status: ACTIVE | Noted: 2019-08-01

## 2019-08-01 LAB
BACTERIA UR QL AUTO: ABNORMAL /HPF
BILIRUB UR QL STRIP: NEGATIVE
CLARITY UR: ABNORMAL
COLOR UR: YELLOW
GLUCOSE SERPL-MCNC: 126 MG/DL (ref 65–140)
GLUCOSE UR STRIP-MCNC: NEGATIVE MG/DL
HGB UR QL STRIP.AUTO: ABNORMAL
KETONES UR STRIP-MCNC: NEGATIVE MG/DL
LEUKOCYTE ESTERASE UR QL STRIP: ABNORMAL
NITRITE UR QL STRIP: POSITIVE
NON-SQ EPI CELLS URNS QL MICRO: ABNORMAL /HPF
PH UR STRIP.AUTO: 6.5 [PH]
PROT UR STRIP-MCNC: NEGATIVE MG/DL
RBC #/AREA URNS AUTO: ABNORMAL /HPF
SP GR UR STRIP.AUTO: 1.02 (ref 1–1.03)
UROBILINOGEN UR QL STRIP.AUTO: 1 E.U./DL
WBC #/AREA URNS AUTO: ABNORMAL /HPF

## 2019-08-01 PROCEDURE — 87086 URINE CULTURE/COLONY COUNT: CPT | Performed by: INTERNAL MEDICINE

## 2019-08-01 PROCEDURE — 49450 REPLACE G/C TUBE PERC: CPT | Performed by: RADIOLOGY

## 2019-08-01 PROCEDURE — C1894 INTRO/SHEATH, NON-LASER: HCPCS

## 2019-08-01 PROCEDURE — 49450 REPLACE G/C TUBE PERC: CPT

## 2019-08-01 PROCEDURE — NC001 PR NO CHARGE: Performed by: RADIOLOGY

## 2019-08-01 PROCEDURE — 99222 1ST HOSP IP/OBS MODERATE 55: CPT | Performed by: NURSE PRACTITIONER

## 2019-08-01 PROCEDURE — 0D20XUZ CHANGE FEEDING DEVICE IN UPPER INTESTINAL TRACT, EXTERNAL APPROACH: ICD-10-PCS | Performed by: RADIOLOGY

## 2019-08-01 PROCEDURE — 87081 CULTURE SCREEN ONLY: CPT | Performed by: INTERNAL MEDICINE

## 2019-08-01 PROCEDURE — 87077 CULTURE AEROBIC IDENTIFY: CPT | Performed by: INTERNAL MEDICINE

## 2019-08-01 PROCEDURE — 87186 SC STD MICRODIL/AGAR DIL: CPT | Performed by: INTERNAL MEDICINE

## 2019-08-01 PROCEDURE — 81001 URINALYSIS AUTO W/SCOPE: CPT | Performed by: INTERNAL MEDICINE

## 2019-08-01 PROCEDURE — 82948 REAGENT STRIP/BLOOD GLUCOSE: CPT

## 2019-08-01 PROCEDURE — C1769 GUIDE WIRE: HCPCS

## 2019-08-01 RX ORDER — SODIUM CHLORIDE 9 MG/ML
100 INJECTION, SOLUTION INTRAVENOUS CONTINUOUS
Status: DISCONTINUED | OUTPATIENT
Start: 2019-08-01 | End: 2019-08-02 | Stop reason: HOSPADM

## 2019-08-01 RX ORDER — LEVETIRACETAM 100 MG/ML
1000 SOLUTION ORAL EVERY 8 HOURS SCHEDULED
Status: DISCONTINUED | OUTPATIENT
Start: 2019-08-02 | End: 2019-08-02 | Stop reason: HOSPADM

## 2019-08-01 RX ORDER — CLOBAZAM 10 MG/1
5 TABLET ORAL EVERY 12 HOURS
Status: DISCONTINUED | OUTPATIENT
Start: 2019-08-02 | End: 2019-08-02 | Stop reason: HOSPADM

## 2019-08-01 RX ORDER — HEPARIN SODIUM 5000 [USP'U]/ML
5000 INJECTION, SOLUTION INTRAVENOUS; SUBCUTANEOUS EVERY 8 HOURS SCHEDULED
Status: DISCONTINUED | OUTPATIENT
Start: 2019-08-01 | End: 2019-08-02 | Stop reason: HOSPADM

## 2019-08-01 RX ORDER — LORAZEPAM 2 MG/ML
1 INJECTION INTRAMUSCULAR EVERY 6 HOURS PRN
Status: DISCONTINUED | OUTPATIENT
Start: 2019-08-01 | End: 2019-08-02 | Stop reason: HOSPADM

## 2019-08-01 RX ORDER — RUFINAMIDE 200 MG/1
1600 TABLET, FILM COATED ORAL 2 TIMES DAILY
Status: DISCONTINUED | OUTPATIENT
Start: 2019-08-01 | End: 2019-08-02 | Stop reason: HOSPADM

## 2019-08-01 RX ORDER — LEVOCARNITINE 1 G/10ML
750 SOLUTION ORAL 3 TIMES DAILY
Status: DISCONTINUED | OUTPATIENT
Start: 2019-08-01 | End: 2019-08-02 | Stop reason: HOSPADM

## 2019-08-01 RX ORDER — LORAZEPAM 2 MG/ML
1 INJECTION INTRAMUSCULAR ONCE
Status: COMPLETED | OUTPATIENT
Start: 2019-08-01 | End: 2019-08-01

## 2019-08-01 RX ADMIN — LORAZEPAM 1 MG: 2 INJECTION, SOLUTION INTRAMUSCULAR; INTRAVENOUS at 14:03

## 2019-08-01 RX ADMIN — HEPARIN SODIUM 5000 UNITS: 5000 INJECTION INTRAVENOUS; SUBCUTANEOUS at 17:02

## 2019-08-01 RX ADMIN — HEPARIN SODIUM 5000 UNITS: 5000 INJECTION INTRAVENOUS; SUBCUTANEOUS at 21:16

## 2019-08-01 RX ADMIN — TOPIRAMATE 250 MG: 100 TABLET, FILM COATED ORAL at 21:05

## 2019-08-01 RX ADMIN — Medication 1000 MG: at 11:52

## 2019-08-01 RX ADMIN — LORAZEPAM 1 MG: 2 INJECTION, SOLUTION INTRAMUSCULAR; INTRAVENOUS at 14:07

## 2019-08-01 RX ADMIN — Medication 1000 MG: at 04:57

## 2019-08-01 RX ADMIN — LEVOCARNITINE 750 MG: 1 SOLUTION ORAL at 17:02

## 2019-08-01 RX ADMIN — LEVETIRACETAM 2000 MG: 500 INJECTION, SOLUTION, CONCENTRATE INTRAVENOUS at 20:20

## 2019-08-01 RX ADMIN — LORAZEPAM 1 MG: 2 INJECTION INTRAMUSCULAR at 14:07

## 2019-08-01 RX ADMIN — SODIUM CHLORIDE 50 ML/HR: 0.9 INJECTION, SOLUTION INTRAVENOUS at 01:23

## 2019-08-01 RX ADMIN — LORAZEPAM 1 MG: 2 INJECTION INTRAMUSCULAR at 14:02

## 2019-08-01 RX ADMIN — IOHEXOL 30 ML: 300 INJECTION, SOLUTION INTRAVENOUS at 16:37

## 2019-08-01 RX ADMIN — RUFINAMIDE 1600 MG: 200 TABLET, FILM COATED ORAL at 17:02

## 2019-08-01 RX ADMIN — LEVOCARNITINE 750 MG: 1 SOLUTION ORAL at 21:05

## 2019-08-01 RX ADMIN — SODIUM CHLORIDE 100 ML/HR: 0.9 INJECTION, SOLUTION INTRAVENOUS at 17:23

## 2019-08-01 NOTE — PROCEDURES
The existing salvage gastrostomy was exchanged over wire for a low profile gastrostomy  Eighteen Luxembourgish tube was placed  No complications or complaints  Contrast injection confirms a location within the stomach

## 2019-08-01 NOTE — ASSESSMENT & PLAN NOTE
Patient admitted to ED following dislodgement of PEG tube  Placed by IR in the past  Woods catheter in place for patency of tract  Will consult IR   IVF for hydration overnight

## 2019-08-01 NOTE — ASSESSMENT & PLAN NOTE
· The patient had a rapid response for breakthrough seizure activity on 08/01/2019 likely secondary to the patient not receiving her normal seizure medication regimen with a malfunctioning PEG tube  · The patient's keppra was converted to IV, but she there were no IV alternatives to her other seizure medications  · The patient required ativan 1 mg IV x 2 doses during the rapid response for two separate episodes of seizure activity  · I discussed the case with Neurology  · The patient was also treated with keppra 2000 mg IV x 1 dose last evening, 08/01/2019  · The PEG tube was exchanged on 08/01/2019, and the patient was restarted on her regular seizure medication regimen  · No further seizure activity was observed after the patient received her regular seizure medication regimen  · The patient needs close outpatient follow-up with her Epileptologist/Neurologist, Dr Issa King

## 2019-08-01 NOTE — NURSING NOTE
Pt was brought on unit on 7/31 at 2348  Pt was made comfortable and cleaned from incontinence  Pt baseline is non verbal  Information was provided from [facility] home documentation  Pt HR in 100s, lungs are clear, poor dental hygiene  Pt arrived with bruise on her right upper arm and slight excoriation on the right side of groin area  Pt is restless  Will continue to monitor

## 2019-08-01 NOTE — NURSING NOTE
PT had witnessed seizure activity  Rapid Response called See documentation  Pt transferred to CCU with oxygen and cardiac monitor Report given to accepting RN

## 2019-08-01 NOTE — H&P
H&P- Ronn Mijares OQKESB 1980, 46 y o  female MRN: [id number]    Unit/Bed#: 423-01 Encounter: [id number]    Primary Care Provider: Koko Lugo MD   Date and time admitted to hospital: 7/31/2019 10:33 PM        * Dislodged gastrostomy tube [facility])  Assessment & Plan  Patient admitted to ED following dislodgement of PEG tube  Placed by IR in the past  Woods catheter in place for patency of tract  Will consult IR   IVF for hydration overnight  Intractable epilepsy without status epilepticus ([facility] [address] )  Assessment & Plan  Will change keppra to IV from po dose   Topiramate, rufinamide, clobazam on hold until PEG placement    Lennox-Gastaut syndrome with tonic seizures (HCC)  Assessment & Plan  History of      Protein calorie malnutrition (HCC)  Assessment & Plan  Malnutrition Findings:    Decreased muscle mass       BMI Findings:   19 16     Body mass index is 19 16 kg/m²  Patient without PEG at this point  Will hydrate with IVF until PEG placement      VTE Prophylaxis: Low VTE, moves freely in bed  Code Status: Full code  POLST: POLST is not applicable to this patient  Discussion with family: mother made aware by staff    Anticipated Length of Stay:  Patient will be admitted on an Observation basis with an anticipated length of stay of  Less than 2 midnights  Justification for Hospital Stay: placement of PEG tube and seizure monitoring until meds reinstated    Total Time for Visit, including Counseling / Coordination of Care: 30 minutes  Greater than 50% of this total time spent on direct patient counseling and coordination of care  Chief Complaint:   Dislodged PEG tube    History of Present Illness:    Tamiko Quiroz is a 46 y o  female with a history of Lennox-Gastaut syndrome with tonic seizures, epilepsy, anoxic brai who presents from [address] [place] [address] with a dislodged PEG tube  Patient has a history of pulling at PEG tube and requiring replacement by IR given the irregular tract   Woods catheter was placed in PEG tract by ED physician to establish patency  Review of Systems:    Review of Systems   Unable to perform ROS: Patient nonverbal       Past Medical and Surgical History:     Past Medical History:   Diagnosis Date    ADHD     Anoxic brain damage ([facility] [address] )     Autistic disorder     Hyperammonemia (HCC)     Hyperkeratosis     Hypotension     Intellectual disability     Intellectual disability     Lennox-Gastaut syndrome with tonic seizures (HCC)     Lethargy     Liver enzyme elevation     Onychomycosis     Osteoporosis     Osteoporosis     Psychiatric disorder     anxiety       Past Surgical History:   Procedure Laterality Date    ABDOMINAL SURGERY      CARDIAC PACEMAKER PLACEMENT      vns implant l chest    IR THORACENTESIS  12/17/2018    JEJUNOSTOMY FEEDING TUBE      PEG TUBE PLACEMENT         Meds/Allergies:    Prior to Admission medications    Medication Sig Start Date End Date Taking?  Authorizing Provider   acetaminophen (TYLENOL) 325 mg tablet 650 mg by Per G Tube route every 6 (six) hours as needed for mild pain    Yes Historical Provider, MD   bisacodyl (BISCOLAX) 10 mg suppository Insert 10 mg into the rectum daily at bedtime as needed for constipation (if no BM day #4)   Yes Historical Provider, MD   Cannabidiol (EPIDIOLEX) 100 MG/ML SOLN 400 mg by Per G Tube route 2 (two) times a day 7/9/19  Yes Karthik Nance MD   cloBAZam (ONFI) 10 MG tablet 0 5 tablets (5 mg total) by Per G Tube route every 12 (twelve) hours 7/1/19  Yes Pooja Chavez MD   levETIRAcetam (KEPPRA) 100 mg/mL oral solution 10 mL (1,000 mg total) by Per G Tube route every 8 (eight) hours 7/3/19  Yes Fabien Amin MD   levOCARNitine (CARNITOR) 1 g/10 mL solution 7 5 mL (750 mg total) by Per G Tube route 3 (three) times a day 9/4/18  Yes Karthik Nance MD   LORazepam (ATIVAN) 0 5 mg tablet Take by mouth as needed for anxiety Give 1 tab prior to dental   Yes Historical Provider, MD   magnesium hydroxide (MILK OF MAGNESIA) 400 mg/5 mL oral suspension Take 30 mL by mouth daily as needed for constipation    Yes Historical Provider, MD   MELATONIN PO 6 mg by Per G Tube route daily at bedtime   Yes Historical Provider, MD   Multiple Vitamins-Minerals (MULTIVITAMIN WITH IRON-MINERALS) liquid 5 mL by Per G Tube route daily   Yes Historical Provider, MD   Probiotic Product (ALEXANDER-BID PROBIOTIC PO) 1 tablet by Per G Tube route daily     Yes Historical Provider, MD   rufinamide (BANZEL) 400 mg tablet 4 tablets (1,600 mg total) by Per G Tube route 2 (two) times a day 2/14/19  Yes Trung Miranda MD   topiramate (TOPAMAX) 50 MG tablet 5 tablets (250 mg total) by Per G Tube route every 12 (twelve) hours 1/22/19  Yes Trung Miranda MD     I have reveiwed home medications using records [facility]  Allergies: Allergies   Allergen Reactions    Felbamate        Social History:     Marital Status: Single   Occupation: n/a  Patient Pre-hospital Living Situation: [facility]  Patient Pre-hospital Level of Mobility: unknown  Patient Pre-hospital Diet Restrictions: Tube feeds  Substance Use History:   Social History     Substance and Sexual Activity   Alcohol Use Not Currently     Social History     Tobacco Use   Smoking Status Never Smoker   Smokeless Tobacco Never Used     Social History     Substance and Sexual Activity   Drug Use No       Family History:    History reviewed  No pertinent family history  Physical Exam:     Vitals:   Blood Pressure: 118/86 (07/31/19 2359)  Pulse: 100 (07/31/19 2359)  Temperature: 97 6 °F (36 4 °C) (07/31/19 2359)  Temp Source: Temporal (07/31/19 2359)  Respirations: 16 (07/31/19 2235)  Height: 5' (152 4 cm) (07/31/19 2359)  Weight - Scale: 44 5 kg (98 lb 1 7 oz) (07/31/19 2359)  SpO2: 98 % (07/31/19 2359)    Physical Exam   Constitutional:   Cachectic, non verbal, non interactive    HENT:   Head: Normocephalic and atraumatic     Right Ear: External ear normal    Left Ear: External ear normal  Nose: Nose normal    Dry, cracked lips, dry mucous membranes   Eyes: Pupils are equal, round, and reactive to light  Conjunctivae are normal    Neck: Normal range of motion  Neck supple  No JVD present  No tracheal deviation present  No thyromegaly present  Cardiovascular: Normal rate, regular rhythm, normal heart sounds and intact distal pulses  Pulmonary/Chest: Effort normal and breath sounds normal    Abdominal: Soft  Bowel sounds are normal  There is no tenderness  There is no guarding  Musculoskeletal: She exhibits no edema  Moving all extremities   Lymphadenopathy:     She has no cervical adenopathy  Neurological:   Eyes open, tracking, does not follow commands or respond to name   Skin: Skin is warm and dry  Additional Data:     Lab Results: I have personally reviewed pertinent reports  Results from last 7 days   Lab Units 07/31/19  2301   WBC Thousand/uL 10 95*   HEMOGLOBIN g/dL 14 2   HEMATOCRIT % 43 7   PLATELETS Thousands/uL 305   NEUTROS PCT % 63   LYMPHS PCT % 26   MONOS PCT % 9   EOS PCT % 1     Results from last 7 days   Lab Units 07/31/19  2301 07/26/19  2106   SODIUM mmol/L 138 143   POTASSIUM mmol/L 3 5 3 6   CHLORIDE mmol/L 103 107   CO2 mmol/L 22 24   BUN mg/dL 12 15   CREATININE mg/dL 0 56* 0 58*   ANION GAP mmol/L 13 12   CALCIUM mg/dL 9 4 9 8   ALBUMIN g/dL  --  4 1   TOTAL BILIRUBIN mg/dL  --  0 40   ALK PHOS U/L  --  148*   ALT U/L  --  59   AST U/L  --  38   GLUCOSE RANDOM mg/dL 115 112     Results from last 7 days   Lab Units 07/31/19  2301   INR  1 04                   Imaging: I have personally reviewed pertinent reports  IR consult    (Results Pending)       EKG, Pathology, and Other Studies Reviewed on Admission:   · EKG: n/a    Allscripts / Epic Records Reviewed: Yes     ** Please Note: This note has been constructed using a voice recognition system   **

## 2019-08-01 NOTE — UTILIZATION REVIEW
Initial Clinical Review    PLEASE NOTE: Patient Upgraded to IP 8/1 @ 22 [id number] from OBS 7/31 @ 2323 due to breakthru Seizure Activity  Admission: Date/Time/Statement:   Start   Ordered   08/01/19 1419  Inpatient Admission Once     Transfer Service: Hospitalist       Question Answer Comment   Admitting Physician Ernst Proctor [facility] Critical [facility]    Estimated length of stay More than 2 Midnights    Certification I certify that inpatient services are medically necessary for this patient for a duration of greater than two midnights  See H&P and MD Progress Notes for additional information about the patient's course of treatment  08/01/19 1418     ED Arrival Information     Expected Arrival Acuity Means of Arrival Escorted By Service Admission Type    - 7/31/2019 22:30 Less Urgent Ambulance [facility] General Medicine Urgent    Arrival Complaint    -        Chief Complaint   Patient presents with    Feeding Tube Problem     Patient present to ED after removing PEG tube      Assessment/Plan: 69-year-old female who is nonverbal with h/oLennox-Gastaut syndrome with tonic seizures, epilepsy, anoxic brain injury, presents to ED via EMS from SNF after pulling out her feeding tube  Feeding tube was placed on 07/24 by interventional radiology  [address] was introduced through the previously established PEG tube tract - solely for the purpose of keeping patent until IR could replace the tube  Tomorrow  Admitted to OBS with dislodged gastrostomy tube and Intractable epilepsy w/o status epilepticus  IVF hydration overnight, Consult IR,  Change Keppra to IV till tube replaced  Holding Topiramate, rufinamide, clobazam on hold until PEG placement    8/1: Rapid Response called due to Seizure - Given Ativan IV x 1  Seizure resolved but then pt had another Sz and 2nd dose of Ativan IV was given  Consult Neurology  Seizure precautions, continue IVF's, Check EEG     Breakthrough seizure activity due to  unable to receive her entire seizure medication regimen this morning with her PEG tube malfunctioning  The PEG tube is scheduled to be exchanged this afternoon  If she continues to have seizure activity, she will need to be transferred to a higher level of care for 24-hour EEG monitoring  With the breakthrough seizure activity and the need for ICU level of care, the patient now requires at least a 2-midnight hospitalization  She will be made INPATIENT ADMISSION status                    ED Triage Vitals   Temperature Pulse Respirations Blood Pressure SpO2   07/31/19 2235 07/31/19 2235 07/31/19 2235 07/31/19 2330 07/31/19 2235   98 °F (36 7 °C) 94 16 117/68 99 %      Temp Source Heart Rate Source Patient Position - Orthostatic VS BP Location FiO2 (%)   07/31/19 2235 07/31/19 2235 08/01/19 0719 07/31/19 2359 --   Temporal Monitor Lying Right arm       Pain Score       07/31/19 2235       No Pain        Wt Readings from Last 1 Encounters:   07/31/19 44 5 kg (98 lb 1 7 oz)     Additional Vital Signs:   08/01/19 14:06:18  --  133Abnormal   --  152/112Abnormal   125  100 %  --  --   08/01/19 14:03:07  --  125Abnormal   --  --  --  99 %  --  --   08/01/19 14:01:49  --  133Abnormal   20  158/87  111  99 %  --  --   08/01/19 1400  --  139Abnormal   --  158/84  109  97 %  --  --       08/01/19 07:19:52  99 5 °F (37 5 °C)  112Abnormal   18  117/87  97  96 %  None (Room air)  Lying   08/01/19 0100  --  109Abnormal   --  --  --  98 %  --  --   07/31/19 23:59:34  97 6 °F (36 4 °C)  100  18  118/86  97  98 %  None (Room air)         Pertinent Labs/Diagnostic Test Results:   Results from last 7 days   Lab Units 07/31/19  2301 07/26/19  2106   WBC Thousand/uL 10 95* 9 71   HEMOGLOBIN g/dL 14 2 13 5   HEMATOCRIT % 43 7 41 5   PLATELETS Thousands/uL 305 306   NEUTROS ABS Thousands/µL 6 95 5 56         Results from last 7 days   Lab Units 07/31/19  2301 07/26/19  2106   SODIUM mmol/L 138 143   POTASSIUM mmol/L 3 5 3  6   CHLORIDE mmol/L 103 107   CO2 mmol/L 22 24   ANION GAP mmol/L 13 12   BUN mg/dL 12 15   CREATININE mg/dL 0 56* 0 58*   EGFR ml/min/1 73sq m 119 117   CALCIUM mg/dL 9 4 9 8     Results from last 7 days   Lab Units 07/26/19  2106   AST U/L 38   ALT U/L 59   ALK PHOS U/L 148*   TOTAL PROTEIN g/dL 8 5*   ALBUMIN g/dL 4 1   TOTAL BILIRUBIN mg/dL 0 40         Results from last 7 days   Lab Units 07/31/19  2301 07/26/19  2106   GLUCOSE RANDOM mg/dL 115 112     Results from last 7 days   Lab Units 07/31/19  2301   PROTIME seconds 13 6   INR  1 04   PTT seconds 31     Results from last 7 days   Lab Units 07/26/19  2106   LIPASE u/L 410*       ED Treatment:   Medication Administration from 07/31/2019 2203 to 07/31/2019 2348       Date/Time Order Dose Route Action Action by Comments     07/31/2019 2964 sodium chloride 0 9 % infusion 125 mL/hr Intravenous New Bag Roberto Carlos Santiago RN         Past Medical History:   Diagnosis Date    ADHD     Anoxic brain damage (HCC)     Autistic disorder     Hyperammonemia (HCC)     Hyperkeratosis     Hypotension     Intellectual disability     Intellectual disability     Lennox-Gastaut syndrome with tonic seizures (HCC)     Lethargy     Liver enzyme elevation     Onychomycosis     Osteoporosis     Osteoporosis     Psychiatric disorder     anxiety     Present on Admission:   Lennox-Gastaut syndrome with tonic seizures (HCC)   Intractable epilepsy without status epilepticus ([facility] [address] )   Protein calorie malnutrition ([facility] [address] )      Admitting Diagnosis: Underweight [R63 6]  Intellectual disability [F79]  Dislodged gastrostomy tube ([facility] [address] ) [Z43 1]  PEG tube malfunction ([facility] [address] ) [K94 23]  Feeding tube dysfunction [T85 598A]  Other generalized epilepsy, intractable, without status epilepticus ([facility] [address] ) [G40 419]  Age/Sex: 46 y o  female  Admission Orders:    Scheduled Meds:  Current Facility-Administered Medications:  heparin (porcine) 5,000 Units Subcutaneous Q8H [address]    levETIRAcetam 1,000 mg Intravenous Q8H    LORazepam 1 mg Intravenous Q6H PRN    LORazepam 1 mg Intravenous Once 8/1   LORazepam 1 mg Intravenous Once 8/1   sodium chloride 100 mL/hr Intravenous Continuous      NPO  Consult IR    Network Utilization Review Department  Phone: [phone]; Fax [phone]  [email or web address]  ATTENTION: Please call with any questions or concerns to [phone]  and carefully listen to the prompts so that you are directed to the right person  Send all requests for admission clinical reviews, approved or denied determinations and any other requests to fax [phone]   All voicemails are confidential

## 2019-08-01 NOTE — ED PROVIDER NOTES
History  Chief Complaint   Patient presents with    Feeding Tube Problem     Patient present to ED after removing PEG tube      81-year-old female who is nonverbal presents after pulling out her feeding tube  Feeding tube was placed on  by interventional radiology  Patient appears in no acute distress but      History provided by:  Patient  History limited by:  Patient nonverbal  Feeding Tube Problem   Location:  Right upper quadrant  Severity:  Mild  Onset quality:  Sudden  Timing:  Constant  Associated symptoms: no abdominal pain, no chest pain, no congestion, no cough, no diarrhea, no ear pain and no headaches        Prior to Admission Medications   Prescriptions Last Dose Informant Patient Reported? Taking?    Cannabidiol (EPIDIOLEX) 100 MG/ML SOLN   No Yes   Si mg by Per G Tube route 2 (two) times a day   LORazepam (ATIVAN) 0 5 mg tablet   Yes Yes   Sig: Take by mouth as needed for anxiety Give 1 tab prior to dental   MELATONIN PO  Outside Facility (Specify) Yes Yes   Si mg by Per G Tube route daily at bedtime   Multiple Vitamins-Minerals (MULTIVITAMIN WITH IRON-MINERALS) liquid  Outside Facility (Specify) Yes Yes   Si mL by Per G Tube route daily   Probiotic Product (ALEXANDER-BID PROBIOTIC PO)  Outside Facility (Specify) Yes Yes   Si tablet by Per G Tube route daily     acetaminophen (TYLENOL) 325 mg tablet  Outside Facility (Specify) Yes Yes   Si mg by Per G Tube route every 6 (six) hours as needed for mild pain    bisacodyl (BISCOLAX) 10 mg suppository  Outside Facility (Specify) Yes Yes   Sig: Insert 10 mg into the rectum daily at bedtime as needed for constipation (if no BM day #4)   cloBAZam (ONFI) 10 MG tablet   No Yes   Si 5 tablets (5 mg total) by Per G Tube route every 12 (twelve) hours   levETIRAcetam (KEPPRA) 100 mg/mL oral solution   No Yes   Sig: 10 mL (1,000 mg total) by Per G Tube route every 8 (eight) hours   levOCARNitine (CARNITOR) 1 g/10 mL solution  Outside Facility (Specify) No Yes   Si 5 mL (750 mg total) by Per G Tube route 3 (three) times a day   magnesium hydroxide (MILK OF MAGNESIA) 400 mg/5 mL oral suspension  Outside Facility (Specify) Yes Yes   Sig: Take 30 mL by mouth daily as needed for constipation    rufinamide (BANZEL) 400 mg tablet  Outside Facility (Specify) No Yes   Si tablets (1,600 mg total) by Per G Tube route 2 (two) times a day   topiramate (TOPAMAX) 50 MG tablet  Outside Facility (Specify) No Yes   Si tablets (250 mg total) by Per G Tube route every 12 (twelve) hours      Facility-Administered Medications: None       Past Medical History:   Diagnosis Date    ADHD     Anoxic brain damage (HCC)     Autistic disorder     Hyperammonemia (HCC)     Hyperkeratosis     Hypotension     Intellectual disability     Intellectual disability     Lennox-Gastaut syndrome with tonic seizures (HCC)     Lethargy     Liver enzyme elevation     Onychomycosis     Osteoporosis     Osteoporosis     Psychiatric disorder     anxiety       Past Surgical History:   Procedure Laterality Date    ABDOMINAL SURGERY      CARDIAC PACEMAKER PLACEMENT      vns implant l chest    IR THORACENTESIS  2018    JEJUNOSTOMY FEEDING TUBE      PEG TUBE PLACEMENT         History reviewed  No pertinent family history  I have reviewed and agree with the history as documented  Social History     Tobacco Use    Smoking status: Never Smoker    Smokeless tobacco: Never Used   Substance Use Topics    Alcohol use: Not Currently    Drug use: No        Review of Systems   Constitutional: Negative  Negative for activity change, appetite change and chills  HENT: Negative for congestion and ear pain  Eyes: Negative  Negative for pain, discharge and itching  Respiratory: Negative for cough  Cardiovascular: Negative for chest pain  Gastrointestinal: Negative for abdominal pain and diarrhea  Endocrine: Negative    Negative for cold intolerance and heat intolerance  Genitourinary: Negative  Negative for difficulty urinating, dyspareunia and dysuria  Musculoskeletal: Negative  Negative for arthralgias, back pain and gait problem  Skin: Negative  Negative for color change and pallor  Allergic/Immunologic: Negative  Negative for environmental allergies  Neurological: Negative  Negative for dizziness, facial asymmetry and headaches  Hematological: Negative  Negative for adenopathy  All other systems reviewed and are negative  Physical Exam  Physical Exam   Constitutional: She appears well-developed and well-nourished  HENT:   Head: Normocephalic  Right Ear: External ear normal    Left Ear: External ear normal    Eyes: Pupils are equal, round, and reactive to light  Right eye exhibits no discharge  Left eye exhibits no discharge  Neck: Normal range of motion  No tracheal deviation present  No thyromegaly present  Cardiovascular: Normal rate, regular rhythm and normal heart sounds  Pulmonary/Chest: Effort normal  No stridor  No respiratory distress  She has no wheezes  Abdominal: Soft  Bowel sounds are normal  She exhibits no distension  There is no tenderness  There is no guarding  Musculoskeletal: She exhibits no edema  Neurological: She is alert  She displays normal reflexes  No cranial nerve deficit  Coordination normal    Skin: Skin is warm  Capillary refill takes less than 2 seconds  No rash noted  No erythema  Psychiatric: She has a normal mood and affect  Her behavior is normal  Thought content normal    Vitals reviewed        Vital Signs  ED Triage Vitals [07/31/19 2235]   Temperature Pulse Respirations BP SpO2   98 °F (36 7 °C) 94 16 -- 99 %      Temp Source Heart Rate Source Patient Position - Orthostatic VS BP Location FiO2 (%)   Temporal Monitor -- -- --      Pain Score       No Pain           Vitals:    07/31/19 2235   Pulse: 94         Visual Acuity      ED Medications  Medications   sodium chloride 0 9 % infusion (125 mL/hr Intravenous New Bag 7/31/19 2314)       Diagnostic Studies  Results Reviewed     Procedure Component Value Units Date/Time    Basic metabolic panel [[id number]]  (Abnormal) Collected:  07/31/19 2301    Lab Status:  Final result Specimen:  Blood from Arm, Left Updated:  07/31/19 2317     Sodium 138 mmol/L      Potassium 3 5 mmol/L      Chloride 103 mmol/L      CO2 22 mmol/L      ANION GAP 13 mmol/L      BUN 12 mg/dL      Creatinine 0 56 mg/dL      Glucose 115 mg/dL      Calcium 9 4 mg/dL      eGFR 119 ml/min/1 73sq m     Narrative:       [place] guidelines for Chronic Kidney Disease (CKD):     Stage 1 with normal or high GFR (GFR > 90 mL/min/1 73 square meters)    Stage 2 Mild CKD (GFR = 60-89 mL/min/1 73 square meters)    Stage 3A Moderate CKD (GFR = 45-59 mL/min/1 73 square meters)    Stage 3B Moderate CKD (GFR = 30-44 mL/min/1 73 square meters)    Stage 4 Severe CKD (GFR = 15-29 mL/min/1 73 square meters)    Stage 5 End Stage CKD (GFR <15 mL/min/1 73 square meters)  Note: GFR calculation is accurate only with a steady state creatinine    Protime-INR [[id number]]  (Normal) Collected:  07/31/19 2301    Lab Status:  Final result Specimen:  Blood from Arm, Left Updated:  07/31/19 2317     Protime 13 6 seconds      INR 1 04    APTT [[id number]]  (Normal) Collected:  07/31/19 2301    Lab Status:  Final result Specimen:  Blood from Arm, Left Updated:  07/31/19 2317     PTT 31 seconds     CBC and differential [[id number]]  (Abnormal) Collected:  07/31/19 2301    Lab Status:  Final result Specimen:  Blood from Arm, Left Updated:  07/31/19 2309     WBC 10 95 Thousand/uL      RBC 4 43 Million/uL      Hemoglobin 14 2 g/dL      Hematocrit 43 7 %      MCV 99 fL      MCH 32 1 pg      MCHC 32 5 g/dL      RDW 12 2 %      MPV 10 5 fL      Platelets 944 Thousands/uL      nRBC 0 /100 WBCs      Neutrophils Relative 63 %      Immat GRANS % 0 %      Lymphocytes Relative 26 % Monocytes Relative 9 %      Eosinophils Relative 1 %      Basophils Relative 1 %      Neutrophils Absolute 6 95 Thousands/µL      Immature Grans Absolute 0 03 Thousand/uL      Lymphocytes Absolute 2 83 Thousands/µL      Monocytes Absolute 1 00 Thousand/µL      Eosinophils Absolute 0 08 Thousand/µL      Basophils Absolute 0 06 Thousands/µL                  No orders to display              Procedures  Procedures       ED Course                               MDM  Number of Diagnoses or Management Options  Dislodged gastrostomy tube [facility]): Intellectual disability:   Other generalized epilepsy, intractable, without status epilepticus ([facility] [address] ):   PEG tube malfunction ([facility] [address] ):   Underweight:   Diagnosis management comments: Old record reviewed  On 07/24 a 25 [place] multi purpose tube was placed by interventional radiology    Procedure note  [address] [place] was introduced through the previously established PEG tube tract  This was solely for the purpose of keeping at patent until IR could replace the multiple catheter tomorrow  I spoke personally with Dr Darell Campbell who will be in tomorrow to replace the tube  Abdominal binder was placed directly thereafter          Amount and/or Complexity of Data Reviewed  Clinical lab tests: ordered and reviewed  Tests in the radiology section of CPT®: ordered and reviewed    Risk of Complications, Morbidity, and/or Mortality  Presenting problems: moderate  Diagnostic procedures: moderate  Management options: moderate        Disposition  Final diagnoses:   PEG tube malfunction ([address] )   Dislodged gastrostomy tube ([facility] [address] )   Other generalized epilepsy, intractable, without status epilepticus ([facility] [address] )   Intellectual disability   Underweight     Time reflects when diagnosis was documented in both MDM as applicable and the Disposition within this note     Time User Action Codes Description Comment    7/31/2019 10:59 PM Emanuel Donahue Add [K94 23] PEG tube malfunction ([address] )     7/31/2019 10:59 PM Sasha Reyna Add [Z43 1] Dislodged gastrostomy tube ([facility] )     7/31/2019 11:00 PM Sasha Reyna Add [U93 814] Other generalized epilepsy, intractable, without status epilepticus ([facility] )     7/31/2019 11:00 PM Sasha Reyna Add [F79] Intellectual disability     7/31/2019 11:00 PM Sasha Reyna Add [R63 6] Underweight       ED Disposition     ED Disposition Condition Date/Time Comment    Admit Stable Wed Jul 31, 2019 10:47 PM         Follow-up Information    None         Patient's Medications   Discharge Prescriptions    No medications on file     No discharge procedures on file      ED Provider  Electronically Signed by           Teresa Valdez DO  07/31/19 5426

## 2019-08-01 NOTE — PLAN OF CARE
Problem: Potential for Falls  Goal: Patient will remain free of falls  Description  INTERVENTIONS:  - Assess patient frequently for physical needs  -  Identify cognitive and physical deficits and behaviors that affect risk of falls  -  [place] fall precautions as indicated by assessment   - Educate patient/family on patient safety including physical limitations  - Instruct patient to call for assistance with activity based on assessment  - Modify environment to reduce risk of injury  - Consider OT/PT consult to assist with strengthening/mobility  Outcome: Progressing     Problem: Prexisting or High Potential for Compromised Skin Integrity  Goal: Skin integrity is maintained or improved  Description  INTERVENTIONS:  - Identify patients at risk for skin breakdown  - Assess and monitor skin integrity  - Assess and monitor nutrition and hydration status  - Monitor labs (i e  albumin)  - Assess for incontinence   - Turn and reposition patient  - Assist with mobility/ambulation  - Relieve pressure over bony prominences  - Avoid friction and shearing  - Provide appropriate hygiene as needed including keeping skin clean and dry  - Evaluate need for skin moisturizer/barrier cream  - Collaborate with interdisciplinary team (i e  Nutrition, Rehabilitation, etc )   - Patient/family teaching  Outcome: Progressing     Problem: Nutrition/Hydration-ADULT  Goal: Nutrient/Hydration intake appropriate for improving, restoring or maintaining nutritional needs  Description  Monitor and assess patient's nutrition/hydration status for malnutrition (ex- brittle hair, bruises, dry skin, pale skin and conjunctiva, muscle wasting, smooth red tongue, and disorientation)  Collaborate with interdisciplinary team and initiate plan and interventions as ordered  Monitor patient's weight and dietary intake as ordered or per policy  Utilize nutrition screening tool and intervene per policy   Determine patient's food preferences and provide high-protein, high-caloric foods as appropriate  INTERVENTIONS:  - Monitor oral intake, urinary output, labs, and treatment plans  - Assess nutrition and hydration status and recommend course of action  - Evaluate amount of meals eaten  - Assist patient with eating if necessary   - Allow adequate time for meals  - Recommend/ encourage appropriate diets, oral nutritional supplements, and vitamin/mineral supplements  - Order, calculate, and assess calorie counts as needed  - Recommend, monitor, and adjust tube feedings and TPN/PPN based on assessed needs  - Assess need for intravenous fluids  - Provide specific nutrition/hydration education as appropriate  - Include patient/family/caregiver in decisions related to nutrition  Outcome: Progressing     Problem: INFECTION - ADULT  Goal: Absence or prevention of progression during hospitalization  Description  INTERVENTIONS:  - Assess and monitor for signs and symptoms of infection  - Monitor lab/diagnostic results  - Monitor all insertion sites, i e  indwelling lines, tubes, and drains  - Monitor endotracheal (as able) and nasal secretions for changes in amount and color  - [place] appropriate cooling/warming therapies per order  - Administer medications as ordered  - Instruct and encourage patient and family to use good hand hygiene technique  - Identify and instruct in appropriate isolation precautions for identified infection/condition  Outcome: Progressing  Goal: Absence of fever/infection during neutropenic period  Description  INTERVENTIONS:  - Monitor WBC  - Implement neutropenic guidelines  Outcome: Progressing     Problem: SAFETY ADULT  Goal: Patient will remain free of falls  Description  INTERVENTIONS:  - Assess patient frequently for physical needs  -  Identify cognitive and physical deficits and behaviors that affect risk of falls    -  [place] fall precautions as indicated by assessment   - Educate patient/family on patient safety including physical limitations  - Instruct patient to call for assistance with activity based on assessment  - Modify environment to reduce risk of injury  - Consider OT/PT consult to assist with strengthening/mobility  Outcome: Progressing  Goal: Maintain or return to baseline ADL function  Description  INTERVENTIONS:  -  Assess patient's ability to carry out ADLs; assess patient's baseline for ADL function and identify physical deficits which impact ability to perform ADLs (bathing, care of mouth/teeth, toileting, grooming, dressing, etc )  - Assess/evaluate cause of self-care deficits   - Assess range of motion  - Assess patient's mobility; develop plan if impaired  - Assess patient's need for assistive devices and provide as appropriate  - Encourage maximum independence but intervene and supervise when necessary  ¯ Involve family in performance of ADLs  ¯ Assess for home care needs following discharge   ¯ Request OT consult to assist with ADL evaluation and planning for discharge  ¯ Provide patient education as appropriate  Outcome: Progressing  Goal: Maintain or return mobility status to optimal level  Description  INTERVENTIONS:  - Assess patient's baseline mobility status (ambulation, transfers, stairs, etc )    - Identify cognitive and physical deficits and behaviors that affect mobility  - Identify mobility aids required to assist with transfers and/or ambulation (gait belt, sit-to-stand, lift, walker, cane, etc )  - [place] fall precautions as indicated by assessment  - Record patient progress and toleration of activity level on Mobility SBAR; progress patient to next Phase/Stage  - Instruct patient to call for assistance with activity based on assessment  - Request Rehabilitation consult to assist with strengthening/weightbearing, etc   Outcome: Progressing     Problem: DISCHARGE PLANNING  Goal: Discharge to home or other facility with appropriate resources  Description  INTERVENTIONS:  - Identify barriers to discharge w/patient and caregiver  - Arrange for needed discharge resources and transportation as appropriate  - Identify discharge learning needs (meds, wound care, etc )  - Arrange for interpretive services to assist at discharge as needed  - Refer to Case Management Department for coordinating discharge planning if the patient needs post-hospital services based on physician/advanced practitioner order or complex needs related to functional status, cognitive ability, or social support system  Outcome: Progressing     Problem: Knowledge Deficit  Goal: Patient/family/caregiver demonstrates understanding of disease process, treatment plan, medications, and discharge instructions  Description  Complete learning assessment and assess knowledge base    Interventions:  - Provide teaching at level of understanding  - Provide teaching via preferred learning methods  Outcome: Progressing

## 2019-08-01 NOTE — DISCHARGE INSTRUCTIONS
How to Use and Care for Your PEG Tube   WHAT YOU NEED TO KNOW:   A percutaneous endoscopic gastrostomy (PEG) tube is a plastic tube that is put into your stomach through your skin  PEG tubes are most often used to give you food or liquids if you are not able to eat or drink  Medical formula, medicines, and water can be given through a PEG tube  DISCHARGE INSTRUCTIONS:   Return to the emergency department if:   · You start coughing or vomiting during or after a feeding  · You have severe abdominal pain  · Blood or tube feeding fluid leaks from the PEG tube site  · Your PEG tube is shorter than it was when it was put in     · Your PEG tube comes out  · Your mouth feels dry, your heart feels like it is beating too fast, or you feel weak  Contact your healthcare provider if:   · You have nausea, diarrhea, or abdominal bloating or discomfort  · You have stomach pain after each feeding or when you move around  · You have discomfort or pain around your PEG tube site  · The skin around your PEG tube is red, swollen, or draining pus  · You weigh less than your healthcare provider says you should  · Your PEG tube is longer than it was when it was put in     · You have questions or concerns about your condition or care  How to use your PEG tube: Your healthcare provider will tell you when and how often to use your PEG tube for feedings  You may need a bolus, intermittent, or continuous feeding  A bolus feeding is when formula is give over a short period of time  An intermittent feeding is scheduled for certain times throughout the day  Continuous feedings run all the time  Ask your healthcare provider which method is best for you:  · Use a feeding syringe  Connect the feeding syringe to the end of the PEG tube  Pour the correct amount of formula into the syringe  Hold the syringe up high  Formula will flow into the PEG tube   The syringe plunger may be used to gently push the last of the formula through the PEG tube  · Use a gravity drip bag  Connect the tubing from the gravity drip bag to the end of the PEG tube  Pour the formula into a gravity drip bag  Hang the bag on a medical pole, a , or other device  Adjust the flow rate on the tubing according to your healthcare provider's instructions  Formula will flow into the PEG tube  Ask how long it should take to complete your feeding  · Use a feeding pump  You may use an electric pump to control the flow of the formula into your PEG tube  Your healthcare provider will teach you how to set up and use the pump  How to care for your PEG tube:   · Always flush your PEG tube before and after each use  This helps prevent blockage from formula or medicine  Use at least 2 tablespoons (30 milliliters) of water to flush the tube  Follow directions for flushing your PEG tube  · If your PEG tube becomes clogged, try to unclog it as soon as you can  Flush your PEG tube with a 60 milliliter (mL) syringe filled with warm water  Never use a wire to unclog the tube  A wire can poke a hole in the tube  Your healthcare provider may have you use a special medicine or a plastic brush to help unclog your tube  · Check the PEG tube daily  ¨ Check the length of the tube from the end to where it goes into your body  If it gets longer, it may be at risk for coming out  If it gets shorter, let your healthcare provider know right away  ¨ Check the bumper  (piece that goes around the tube, next to your skin)  It should be snug against your skin  Tell your healthcare provider if the bumper seems too tight or too loose  · Use an alcohol pad to clean the end of your PEG tube  Do this before you connect tubing or a syringe to your PEG tube and after you remove it  When you disconnect tubing or a syringe from your PEG tube, do not let the end of the PEG tube touch anything  How do I care for the skin around my PEG tube?    · Do not remove the stitches or medical tape that hold your PEG tube in place  when you first get it  Your healthcare provider will take them off once the skin around your tube heals  Leave clean bandages over the tube area for the first 24 hours after the tube is put in  You may not need to use bandages after 24 hours if the skin around the tube looks dry  Ask when you can shower or bathe  · Routine skin care:      ¨ Clean the skin around your tube 1 to 2 times each day  Ask your healthcare provider what you should use to clean your skin  Check for redness and swelling in the area where the tube goes into your body  Check for fluid draining from your stoma (the hole where the tube was put in)  ¨ Gently turn your tube daily  after your stitches come out  This may decrease pressure on your skin under the bumper  It may also help prevent an infection  ¨ Keep the skin around your PEG tube dry  This will help prevent skin irritation and infection  · Use topical medicines as directed  You may need to put antibiotic cream on the skin around your tube after you are done cleaning it  Other tips to know about a PEG tube:   · You may need to keep track of how much formula and other liquids you have each day  You may also need to keep track of how much you urinate and how many times you have a bowel movement each day  Bring this record to your follow-up visits  · You may need to check your weight daily or weekly  Keep a record of your weights and bring it to your follow-up visits  Your healthcare provider may need to change your feedings if your weight changes too quickly  · Take your medicines as directed  Learn which of your medicines can be crushed, mixed with water, and given through the PEG tube  Certain medicines should not be crushed or may clog the PEG tube  · Go to all follow-up appointments  You may need to have blood tests and other tests when you see your healthcare provider    © 2017 [facility] is for End User's use only and may not be sold, redistributed or otherwise used for commercial purposes  All illustrations and images included in CareNotes® are the copyrighted property of A D A M [employer]  or Jason Chiu  The above information is an  only  It is not intended as medical advice for individual conditions or treatments  Talk to your doctor, nurse or pharmacist before following any medical regimen to see if it is safe and effective for you

## 2019-08-01 NOTE — SOCIAL WORK
Spoke with Xochitl Oconnell in IR who is aware of their consult and they will be here tomorrow and do it

## 2019-08-01 NOTE — ASSESSMENT & PLAN NOTE
Malnutrition Findings:    Decreased muscle mass       BMI Findings:   19 16     Body mass index is 19 16 kg/m²       · Restart tube feedings now that she has a functioning PEG tube

## 2019-08-01 NOTE — PHYSICIAN ADVISOR
Current patient class: Inpatient  The patient is currently on Hospital Day: 2 at [address]      This patient was originally admitted to the hospital under observation status  After admission the patient was reevaluated and determined to require further hospitalization  The patient is now documented to require at least a 2nd midnight in the hospital  As such the patient is now expected to satisfy the 2 midnight benchmark and is therefore eligible for inpatient admission  After review of the relevant documentation, labs, vital signs and test results, the patient is appropriate for INPATIENT ADMISSION  Admission to the hospital as an inpatient is a complex decision making process which requires the practitioner to consider the patients presenting complaint, history and physical examination and all relevant testing  With this in mind, in this case, the patient was deemed appropriate for INPATIENT ADMISSION  After review of the documentation and testing available at the time of the admission I concur with this clinical determination of medical necessity  Rationale is as follows: The patient is a 46 yrs Female who presented to the ED at 7/31/2019 10:33 PM with a chief complaint of Feeding Tube Problem (Patient present to ED after removing PEG tube )  Patient has had previous problems with feeding tube dislodgement as patient has been described previously to pull at the tube and dislodge it  Patient is brought to the hospital because of this which does need to be replaced however while hospitalized began having seizures requiring rapid response teams to be called on 2 different occasions  With ongoing seizure activity patient was transferred to the cardiac care unit for closer observation and additional management  Because the patient will be staying in the hospital longer due to seizure related issues, anticipate inpatient status is appropriate, rather than observation    To meet this bench thanh, 2 midnights in the hospital should be met  Neurology consult is to be placed but this input has not yet been entered for review      The patients vitals on arrival were ED Triage Vitals   Temperature Pulse Respirations Blood Pressure SpO2   07/31/19 2235 07/31/19 2235 07/31/19 2235 07/31/19 2330 07/31/19 2235   98 °F (36 7 °C) 94 16 117/68 99 %      Temp Source Heart Rate Source Patient Position - Orthostatic VS BP Location FiO2 (%)   07/31/19 2235 07/31/19 2235 08/01/19 0719 07/31/19 2359 --   Temporal Monitor Lying Right arm       Pain Score       07/31/19 2235       No Pain           Past Medical History:   Diagnosis Date    ADHD     Anoxic brain damage (HCC)     Autistic disorder     Hyperammonemia (HCC)     Hyperkeratosis     Hypotension     Intellectual disability     Intellectual disability     Lennox-Gastaut syndrome with tonic seizures (HCC)     Lethargy     Liver enzyme elevation     Onychomycosis     Osteoporosis     Osteoporosis     Psychiatric disorder     anxiety     Past Surgical History:   Procedure Laterality Date    ABDOMINAL SURGERY      CARDIAC PACEMAKER PLACEMENT      vns implant l chest    IR THORACENTESIS  12/17/2018    JEJUNOSTOMY FEEDING TUBE      PEG TUBE PLACEMENT         The patient was admitted to the hospital at [id number] on 8/1/19 for the following diagnosis:  Underweight [R63 6]  Intellectual disability [F79]  Dislodged gastrostomy tube ([address] ) [Z43 1]  PEG tube malfunction ([address] ) [K94 23]  Feeding tube dysfunction [T85 598A]  Other generalized epilepsy, intractable, without status epilepticus ([address] ) [G40 419]    Consults have been placed to:   IP CONSULT TO NEUROLOGY    Vitals:    08/01/19 1403 08/01/19 1406 08/01/19 1406 08/01/19 1429   BP:   (!) 152/112 132/74   BP Location:    Right arm   Pulse: (!) 125 (!) 130 (!) 133 (!) 119   Resp:    20   Temp:    99 5 °F (37 5 °C)   TempSrc:    Temporal   SpO2: 99% 99% 100% 99%   Weight:       Height: Most recent labs:    Recent Labs     07/31/19  2301   WBC 10 95*   HGB 14 2   HCT 43 7      K 3 5   CALCIUM 9 4   BUN 12   CREATININE 0 56*   INR 1 04       Scheduled Meds:  Current Facility-Administered Medications:  cloBAZam 5 mg Per G Tube Q12H Dennis Risen, DO    [MAR Hold] heparin (porcine) 5,000 Units Subcutaneous [facility] Dennis Risen, DO    [MAR Hold] levETIRAcetam 1,000 mg Intravenous Q8H KATHY Banuelos Last Rate: 1,000 mg (08/01/19 1152)   levOCARNitine 750 mg Per G Tube TID Dennis Risen, DO    [MAR Hold] LORazepam 1 mg Intravenous Q6H PRN KATHY Banuelos    [facility] Hold] LORazepam 1 mg Intravenous Once Dennis Risen, DO    [MAR Hold] LORazepam 1 mg Intravenous Once Dennis Risen, DO    rufinamide 1,600 mg Per G Tube BID Dennis Risen, DO    sodium chloride 100 mL/hr Intravenous Continuous Dexter Cole PA-C Last Rate: 100 mL/hr (08/01/19 1046)   topiramate 250 mg Per G Tube Q12H [address] Topher Gaston DO      Continuous Infusions:  sodium chloride 100 mL/hr Last Rate: 100 mL/hr (08/01/19 1046)     PRN Meds:  [MAR Hold] LORazepam    Surgical procedures (if appropriate):

## 2019-08-01 NOTE — ASSESSMENT & PLAN NOTE
Malnutrition Findings:    Decreased muscle mass       BMI Findings:   19 16     Body mass index is 19 16 kg/m²     Patient without PEG at this point  Will hydrate with IVF until PEG placement

## 2019-08-01 NOTE — ASSESSMENT & PLAN NOTE
Will change keppra to IV from po dose   Topiramate, rufinamide, clobazam on hold until PEG placement

## 2019-08-01 NOTE — QUICK NOTE
A rapid response was called secondary to the patient having a seizure  The patient was given ativan 1 mg IV x 1 dose  The first seizure resolved, but then the patient had a second seizure during the rapid response  A second dose of ativan 1 mg IV x 1 dose was given  The patient will be transferred to ICU level of care  Consult Neurology  Seizure precautions  Continue NSS IV fluids at 100 ml/hr  Check an EEG  The patient had breakthrough seizure activity, since she was unable to receive her entire seizure medication regimen this morning with her PEG tube malfunctioning  The PEG tube is scheduled to be exchanged this afternoon  If she continues to have seizure activity, she will need to be transferred to a higher level of care for 24-hour EEG monitoring  With the breakthrough seizure activity and the need for ICU level of care, the patient now requires at least a 2-midnight hospitalization  She will be made INPATIENT ADMISSION status

## 2019-08-01 NOTE — ASSESSMENT & PLAN NOTE
· The patient had a rapid response for breakthrough seizure activity on 08/01/2019 likely secondary to the patient not receiving her normal seizure medication regimen with a malfunctioning PEG tube  · The patient's keppra was converted to IV, but she there were no IV alternatives to her other seizure medications  · The patient required ativan 1 mg IV x 2 doses during the rapid response for two separate episodes of seizure activity  · I discussed the case with Neurology  · The patient was also treated with keppra 2000 mg IV x 1 dose last evening, 08/01/2019  · The PEG tube was exchanged on 08/01/2019, and the patient was restarted on her regular seizure medication regimen  · No further seizure activity was observed after the patient received her regular seizure medication regimen  · The patient needs close outpatient follow-up with her Epileptologist/Neurologist, Dr Oliver Vanessa

## 2019-08-02 ENCOUNTER — HOSPITAL ENCOUNTER (OUTPATIENT)
Dept: RADIOLOGY | Facility: HOSPITAL | Age: 39
Discharge: HOME | End: 2019-08-02

## 2019-08-02 VITALS
SYSTOLIC BLOOD PRESSURE: 120 MMHG | RESPIRATION RATE: 18 BRPM | HEART RATE: 116 BPM | HEIGHT: 60 IN | TEMPERATURE: 98 F | WEIGHT: 98.11 LBS | DIASTOLIC BLOOD PRESSURE: 70 MMHG | BODY MASS INDEX: 19.26 KG/M2 | OXYGEN SATURATION: 100 %

## 2019-08-02 PROBLEM — K76.0 FATTY INFILTRATION OF LIVER: Status: ACTIVE | Noted: 2019-08-02

## 2019-08-02 PROBLEM — N30.01 ACUTE CYSTITIS WITH HEMATURIA: Status: ACTIVE | Noted: 2019-08-02

## 2019-08-02 LAB
ALBUMIN SERPL BCP-MCNC: 3 G/DL (ref 3.5–5)
ALP SERPL-CCNC: 124 U/L (ref 46–116)
ALT SERPL W P-5'-P-CCNC: 40 U/L (ref 12–78)
ANION GAP SERPL CALCULATED.3IONS-SCNC: 12 MMOL/L (ref 4–13)
AST SERPL W P-5'-P-CCNC: 35 U/L (ref 5–45)
BASOPHILS # BLD AUTO: 0.06 THOUSANDS/ΜL (ref 0–0.1)
BASOPHILS NFR BLD AUTO: 1 % (ref 0–1)
BILIRUB SERPL-MCNC: 0.4 MG/DL (ref 0.2–1)
BUN SERPL-MCNC: 6 MG/DL (ref 5–25)
CALCIUM SERPL-MCNC: 8.4 MG/DL (ref 8.3–10.1)
CHLORIDE SERPL-SCNC: 106 MMOL/L (ref 100–108)
CK SERPL-CCNC: 27 U/L (ref 26–192)
CO2 SERPL-SCNC: 20 MMOL/L (ref 21–32)
CREAT SERPL-MCNC: 0.33 MG/DL (ref 0.6–1.3)
EOSINOPHIL # BLD AUTO: 0.16 THOUSAND/ΜL (ref 0–0.61)
EOSINOPHIL NFR BLD AUTO: 2 % (ref 0–6)
ERYTHROCYTE [DISTWIDTH] IN BLOOD BY AUTOMATED COUNT: 12.1 % (ref 11.6–15.1)
GFR SERPL CREATININE-BSD FRML MDRD: 141 ML/MIN/1.73SQ M
GLUCOSE SERPL-MCNC: 83 MG/DL (ref 65–140)
HCT VFR BLD AUTO: 41.5 % (ref 34.8–46.1)
HGB BLD-MCNC: 13 G/DL (ref 11.5–15.4)
IMM GRANULOCYTES # BLD AUTO: 0.02 THOUSAND/UL (ref 0–0.2)
IMM GRANULOCYTES NFR BLD AUTO: 0 % (ref 0–2)
LACTATE SERPL-SCNC: 0.4 MMOL/L (ref 0.5–2)
LYMPHOCYTES # BLD AUTO: 2.7 THOUSANDS/ΜL (ref 0.6–4.47)
LYMPHOCYTES NFR BLD AUTO: 31 % (ref 14–44)
MAGNESIUM SERPL-MCNC: 1.7 MG/DL (ref 1.6–2.6)
MCH RBC QN AUTO: 31.6 PG (ref 26.8–34.3)
MCHC RBC AUTO-ENTMCNC: 31.3 G/DL (ref 31.4–37.4)
MCV RBC AUTO: 101 FL (ref 82–98)
MONOCYTES # BLD AUTO: 0.68 THOUSAND/ΜL (ref 0.17–1.22)
MONOCYTES NFR BLD AUTO: 8 % (ref 4–12)
NEUTROPHILS # BLD AUTO: 5.23 THOUSANDS/ΜL (ref 1.85–7.62)
NEUTS SEG NFR BLD AUTO: 58 % (ref 43–75)
NRBC BLD AUTO-RTO: 0 /100 WBCS
PHOSPHATE SERPL-MCNC: 2.5 MG/DL (ref 2.7–4.5)
PLATELET # BLD AUTO: 266 THOUSANDS/UL (ref 149–390)
PMV BLD AUTO: 11.1 FL (ref 8.9–12.7)
POTASSIUM SERPL-SCNC: 3.4 MMOL/L (ref 3.5–5.3)
PROCALCITONIN SERPL-MCNC: <0.05 NG/ML
PROT SERPL-MCNC: 6.7 G/DL (ref 6.4–8.2)
RBC # BLD AUTO: 4.11 MILLION/UL (ref 3.81–5.12)
SODIUM SERPL-SCNC: 138 MMOL/L (ref 136–145)
WBC # BLD AUTO: 8.85 THOUSAND/UL (ref 4.31–10.16)

## 2019-08-02 PROCEDURE — 99239 HOSP IP/OBS DSCHRG MGMT >30: CPT | Performed by: INTERNAL MEDICINE

## 2019-08-02 PROCEDURE — 84145 PROCALCITONIN (PCT): CPT | Performed by: INTERNAL MEDICINE

## 2019-08-02 PROCEDURE — 82550 ASSAY OF CK (CPK): CPT | Performed by: INTERNAL MEDICINE

## 2019-08-02 PROCEDURE — 87040 BLOOD CULTURE FOR BACTERIA: CPT | Performed by: INTERNAL MEDICINE

## 2019-08-02 PROCEDURE — 80053 COMPREHEN METABOLIC PANEL: CPT | Performed by: INTERNAL MEDICINE

## 2019-08-02 PROCEDURE — 84100 ASSAY OF PHOSPHORUS: CPT | Performed by: INTERNAL MEDICINE

## 2019-08-02 PROCEDURE — 85025 COMPLETE CBC W/AUTO DIFF WBC: CPT | Performed by: INTERNAL MEDICINE

## 2019-08-02 PROCEDURE — 83605 ASSAY OF LACTIC ACID: CPT | Performed by: INTERNAL MEDICINE

## 2019-08-02 PROCEDURE — 83735 ASSAY OF MAGNESIUM: CPT | Performed by: INTERNAL MEDICINE

## 2019-08-02 RX ORDER — ACETAMINOPHEN 325 MG/1
650 TABLET ORAL EVERY 6 HOURS PRN
Qty: 30 TABLET | Refills: 0 | Status: SHIPPED | OUTPATIENT
Start: 2019-08-02 | End: 2019-12-17 | Stop reason: ALTCHOICE

## 2019-08-02 RX ORDER — CEFTRIAXONE 1 G/50ML
1000 INJECTION, SOLUTION INTRAVENOUS EVERY 24 HOURS
Status: DISCONTINUED | OUTPATIENT
Start: 2019-08-02 | End: 2019-08-02 | Stop reason: HOSPADM

## 2019-08-02 RX ORDER — MAGNESIUM SULFATE HEPTAHYDRATE 40 MG/ML
2 INJECTION, SOLUTION INTRAVENOUS ONCE
Status: COMPLETED | OUTPATIENT
Start: 2019-08-02 | End: 2019-08-02

## 2019-08-02 RX ORDER — POTASSIUM CHLORIDE 20MEQ/15ML
40 LIQUID (ML) ORAL ONCE
Status: COMPLETED | OUTPATIENT
Start: 2019-08-02 | End: 2019-08-02

## 2019-08-02 RX ORDER — CEPHALEXIN 500 MG/1
500 CAPSULE ORAL EVERY 8 HOURS SCHEDULED
Refills: 0
Start: 2019-08-02 | End: 2019-08-05

## 2019-08-02 RX ORDER — POTASSIUM CHLORIDE 14.9 MG/ML
20 INJECTION INTRAVENOUS
Status: DISCONTINUED | OUTPATIENT
Start: 2019-08-02 | End: 2019-08-02

## 2019-08-02 RX ORDER — CHLORHEXIDINE GLUCONATE 4 G/100ML
1 SOLUTION TOPICAL DAILY
Qty: 120 ML | Refills: 0
Start: 2019-08-02 | End: 2019-08-22

## 2019-08-02 RX ADMIN — SODIUM CHLORIDE 100 ML/HR: 0.9 INJECTION, SOLUTION INTRAVENOUS at 01:22

## 2019-08-02 RX ADMIN — DIBASIC SODIUM PHOSPHATE, MONOBASIC POTASSIUM PHOSPHATE AND MONOBASIC SODIUM PHOSPHATE 2 TABLET: 852; 155; 130 TABLET ORAL at 10:52

## 2019-08-02 RX ADMIN — CLOBAZAM 5 MG: 10 TABLET ORAL at 09:58

## 2019-08-02 RX ADMIN — MAGNESIUM SULFATE HEPTAHYDRATE 2 G: 40 INJECTION, SOLUTION INTRAVENOUS at 15:15

## 2019-08-02 RX ADMIN — TOPIRAMATE 250 MG: 100 TABLET, FILM COATED ORAL at 10:52

## 2019-08-02 RX ADMIN — RUFINAMIDE 1600 MG: 200 TABLET, FILM COATED ORAL at 11:00

## 2019-08-02 RX ADMIN — LEVOCARNITINE 750 MG: 1 SOLUTION ORAL at 09:30

## 2019-08-02 RX ADMIN — HEPARIN SODIUM 5000 UNITS: 5000 INJECTION INTRAVENOUS; SUBCUTANEOUS at 05:17

## 2019-08-02 RX ADMIN — CEFTRIAXONE 1000 MG: 1 INJECTION, SOLUTION INTRAVENOUS at 09:55

## 2019-08-02 RX ADMIN — LEVETIRACETAM 1000 MG: 100 SOLUTION ORAL at 10:00

## 2019-08-02 RX ADMIN — SODIUM CHLORIDE 1000 ML: 0.9 INJECTION, SOLUTION INTRAVENOUS at 09:30

## 2019-08-02 RX ADMIN — HEPARIN SODIUM 5000 UNITS: 5000 INJECTION INTRAVENOUS; SUBCUTANEOUS at 15:00

## 2019-08-02 RX ADMIN — POTASSIUM CHLORIDE 40 MEQ: 20 SOLUTION ORAL at 10:54

## 2019-08-02 RX ADMIN — LEVETIRACETAM 1000 MG: 100 SOLUTION ORAL at 14:55

## 2019-08-02 RX ADMIN — MUPIROCIN: 20 OINTMENT TOPICAL at 09:55

## 2019-08-02 NOTE — ASSESSMENT & PLAN NOTE
· The patient needs an outpatient mammogram completed  · Outpatient evaluation by the Breast Surgery specialist

## 2019-08-02 NOTE — ASSESSMENT & PLAN NOTE
· The patient will be placed on contact precautions  She will be maintained on nasal bactroban/mupirocin 2% ointment applied to both nares BID for 5 days  The patient will also need MRSA decolonization at [facility] including chlorhexidine/hibiclens rinse applied from the neck down to the toes daily with bathing/showering for 2 weeks  She will need a MRSA nasal screen rechecked in 2 weeks after decolonization

## 2019-08-02 NOTE — NURSING NOTE
Patient transferred to [place] [address] in no acute distress  Gastrostomy tube intact and clamped  Dressing to insertion site dry and intact  Patient has abdominal binder intact to keep gastrostomy tube safe from patient pulling out  Report to Arsalan at [address]

## 2019-08-02 NOTE — DISCHARGE SUMMARY
Discharge- [facility]rugg[facility] COTTAGE 1980, 46 y o  female MRN: [id number]    Unit/Bed#:  Encounter: [id number]    Primary Care Provider: Dante Awan MD   Date and time admitted to hospital: 7/31/2019 10:33 PM        * Lennox-Gastaut syndrome with tonic seizures ([facility] )  Assessment & Plan  · The patient had a rapid response for breakthrough seizure activity on 08/01/2019 likely secondary to the patient not receiving her normal seizure medication regimen with a malfunctioning PEG tube  · The patient's keppra was converted to IV, but she there were no IV alternatives to her other seizure medications  · The patient required ativan 1 mg IV x 2 doses during the rapid response for two separate episodes of seizure activity  · I discussed the case with Neurology  · The patient was also treated with keppra 2000 mg IV x 1 dose last evening, 08/01/2019  · The PEG tube was exchanged on 08/01/2019, and the patient was restarted on her regular seizure medication regimen  · No further seizure activity was observed after the patient received her regular seizure medication regimen  · The patient needs close outpatient follow-up with her Epileptologist/Neurologist, Dr Cuello Cost gastrostomy tube [facility])  Assessment & Plan  · The patient's PEG tube was successfully replaced on 08/01/2019 by Interventional Radiology    Intractable epilepsy without status epilepticus ([facility] )  Assessment & Plan  · The patient had a rapid response for breakthrough seizure activity on 08/01/2019 likely secondary to the patient not receiving her normal seizure medication regimen with a malfunctioning PEG tube  · The patient's keppra was converted to IV, but she there were no IV alternatives to her other seizure medications  · The patient required ativan 1 mg IV x 2 doses during the rapid response for two separate episodes of seizure activity  · I discussed the case with Neurology  · The patient was also treated with keppra 2000 mg IV x 1 dose last evening, 08/01/2019  · The PEG tube was exchanged on 08/01/2019, and the patient was restarted on her regular seizure medication regimen  · No further seizure activity was observed after the patient received her regular seizure medication regimen  · The patient needs close outpatient follow-up with her Epileptologist/Neurologist, Dr Michael Ruvalcaba infiltration of liver  Assessment & Plan  · Outpatient surveillance imaging with Gastroenterology      Acute cystitis with hematuria  Assessment & Plan  · Treated with ceftriaxone 1000 mg IV every 24 hours  · The patient will be discharged on cephalexin 500 mg PO every 8 hours for 3 additional days of antibiotics  · Await the final urine culture results    Breakthrough seizure ([facility] )  Assessment & Plan  · The patient had a rapid response for breakthrough seizure activity on 08/01/2019 likely secondary to the patient not receiving her normal seizure medication regimen with a malfunctioning PEG tube  · The patient's keppra was converted to IV, but she there were no IV alternatives to her other seizure medications  · The patient required ativan 1 mg IV x 2 doses during the rapid response for two separate episodes of seizure activity  · I discussed the case with Neurology  · The patient was also treated with keppra 2000 mg IV x 1 dose last evening, 08/01/2019  · The PEG tube was exchanged on 08/01/2019, and the patient was restarted on her regular seizure medication regimen  · No further seizure activity was observed after the patient received her regular seizure medication regimen  · The patient needs close outpatient follow-up with her Epileptologist/Neurologist, Dr Girish Escobar calorie malnutrition [facility])  Assessment & Plan  Malnutrition Findings:    Decreased muscle mass       BMI Findings:   19 16     Body mass index is 19 16 kg/m²       · Restart tube feedings now that she has a functioning PEG tube    Breast mass  Assessment & Plan  · The patient needs an outpatient mammogram completed  · Outpatient evaluation by the Breast Surgery specialist    Hypophosphatemia  Assessment & Plan  · Replete with potassium phosphate via the PEG tube  · Follow the phosphorus level after discharge     MRSA colonization  Assessment & Plan  · The patient will be placed on contact precautions  She will be maintained on nasal bactroban/mupirocin 2% ointment applied to both nares BID for 5 days  The patient will also need MRSA decolonization at [facility] including chlorhexidine/hibiclens rinse applied from the neck down to the toes daily with bathing/showering for 2 weeks  She will need a MRSA nasal screen rechecked in 2 weeks after decolonization  Discharging Physician / Practitioner: Topher Xie DO  PCP: Viola Jack MD  Admission Date:   Admission Orders (From admission, onward)     Ordered        08/01/19 1418  Inpatient Admission  Once         07/31/19 2323  Place in Observation (expected length of stay for this patient is less than two midnights)  Once                   Discharge Date: 08/02/19      Consultations During Hospital Stay:  · Interventional Radiology    Procedures Performed:   · PEG tube exchange on 08/01/2019 by Dr Daphne Mishra (Interventional Radiology)    Significant Findings / Test Results:   Microbiology Results (last 21 days)     Procedure Component Value - Date/Time   Blood culture [[id number]] Collected: 08/02/19 0508   Lab Status: In process Specimen: Blood from Arm, Left Updated: 08/02/19 0515   Blood culture [[id number]] Collected: 08/02/19 0452   Lab Status: In process Specimen: Blood from Hand, Right Updated: 08/02/19 0515   Urine culture [[id number]] Collected: 08/01/19 1959   Lab Status: In process Specimen: Urine, Other Updated: 08/01/19 2006   MRSA culture [[id number]] Collected: 08/01/19 1743   Lab Status:  In process Specimen: Nares from Nose Updated: 08/01/19 1843         Incidental Findings:   · None     Test Results Pending at Discharge (will require follow up): Microbiology Results (last 21 days)     Procedure Component Value - Date/Time   Blood culture [[id number]] Collected: 08/02/19 0508   Lab Status: In process Specimen: Blood from Arm, Left Updated: 08/02/19 0515   Blood culture [[id number]] Collected: 08/02/19 0452   Lab Status: In process Specimen: Blood from Hand, Right Updated: 08/02/19 0515   Urine culture [[id number]] Collected: 08/01/19 1959   Lab Status: In process Specimen: Urine, Other Updated: 08/01/19 2006   MRSA culture [[id number]] Collected: 08/01/19 1743   Lab Status: In process Specimen: Nares from Nose Updated: 08/01/19 1843        Outpatient Tests Requested:  · CBC with diff , CMP, Mag, Phos, Procalcitonin level in 1 day and in 1 week    Complications:  None    Reason for Admission:  Dislodged and malfunctioning PEG tube    Hospital Course:     Edgardo Caba is a 46 y o  female patient who originally presented to the hospital on 7/31/2019 due to a dislodged and malfunctioning PEG tube  Please see above list of diagnoses and related plan for additional information  Condition at Discharge: stable     Discharge Day Visit / Exam:     Subjective: The patient was seen and examined  The patient is non-verbal at baseline  She is awake and alert this morning  She appears to be at her mental status baseline  No seizure activity overnight      Vitals: Blood Pressure: 97/68 (08/02/19 0836)  Pulse: (!) 112 (08/02/19 0836)  Temperature: 97 6 °F (36 4 °C) (08/02/19 0836)  Temp Source: Temporal (08/02/19 0836)  Respirations: (!) 24 (08/02/19 0836)  Height: 5' (152 4 cm) (07/31/19 2359)  Weight - Scale: 44 5 kg (98 lb 1 7 oz) (07/31/19 2359)  SpO2: 97 % (08/02/19 0836)  Exam:   Physical Exam  General:  NAD, awake, alert, non-verbal at baseline  HEENT:  NC/AT, mucous membranes dry  Neck:  Supple, No JVP elevation  CV:  + S1, + S2, Tachycardic, Regular rhythm  Pulm:  Lung fields are CTA bilaterally  Abd:  Soft, Non-tender, Non-distended, PEG tube in place  Ext:  No clubbing/cyanosis/edema  Skin:  No rashes    Discharge instructions/Information to patient and family:   See after visit summary for information provided to patient and family  Provisions for Follow-Up Care:  See after visit summary for information related to follow-up care and any pertinent home health orders  Disposition:     Antoine Horta [facility] at [facility]    Planned Readmission:  No     Discharge Statement:  I spent 40 minutes discharging the patient  This time was spent on the day of discharge  I had direct contact with the patient on the day of discharge  Greater than 50% of the total time was spent examining patient, answering all patient questions, arranging and discussing plan of care with patient as well as directly providing post-discharge instructions  Additional time then spent on discharge activities  Discharge Medications:  See after visit summary for reconciled discharge medications provided to patient and family        ** Please Note: This note has been constructed using a voice recognition system **

## 2019-08-02 NOTE — SOCIAL WORK
Pt is being discharged today   Pt will return to [facility] ([facility]) will pick the patient up at 3:30 pm  I notified [facility] of same  A copy of AVS will be sent with the patient

## 2019-08-02 NOTE — ASSESSMENT & PLAN NOTE
· Treated with ceftriaxone 1000 mg IV every 24 hours  · The patient will be discharged on cephalexin 500 mg PO every 8 hours for 3 additional days of antibiotics  · Await the final urine culture results

## 2019-08-02 NOTE — NURSING NOTE
Patient status discussed with Dr Alley joyce for [facility] later today, plan of care reviewed and updated   Case management aware patient to be DC later today

## 2019-08-02 NOTE — ASSESSMENT & PLAN NOTE
· The patient had a rapid response for breakthrough seizure activity on 08/01/2019 likely secondary to the patient not receiving her normal seizure medication regimen with a malfunctioning PEG tube  · The patient's keppra was converted to IV, but she there were no IV alternatives to her other seizure medications  · The patient required ativan 1 mg IV x 2 doses during the rapid response for two separate episodes of seizure activity  · I discussed the case with Neurology  · The patient was also treated with keppra 2000 mg IV x 1 dose last evening, 08/01/2019  · The PEG tube was exchanged on 08/01/2019, and the patient was restarted on her regular seizure medication regimen  · No further seizure activity was observed after the patient received her regular seizure medication regimen  · The patient needs close outpatient follow-up with her Epileptologist/Neurologist, Dr Deborah Palacios

## 2019-08-03 LAB — MRSA NOSE QL CULT: NORMAL

## 2019-08-04 LAB — BACTERIA UR CULT: ABNORMAL

## 2019-08-07 LAB
BACTERIA BLD CULT: NORMAL
BACTERIA BLD CULT: NORMAL

## 2019-08-22 ENCOUNTER — OFFICE VISIT (OUTPATIENT)
Dept: NEUROLOGY | Facility: CLINIC | Age: 39
End: 2019-08-22
Payer: MEDICARE

## 2019-08-22 VITALS — DIASTOLIC BLOOD PRESSURE: 54 MMHG | SYSTOLIC BLOOD PRESSURE: 72 MMHG | HEART RATE: 95 BPM

## 2019-08-22 DIAGNOSIS — G40.812 LENNOX-GASTAUT SYNDROME WITH TONIC SEIZURES (HCC): Primary | ICD-10-CM

## 2019-08-22 DIAGNOSIS — G40.919 INTRACTABLE EPILEPSY WITHOUT STATUS EPILEPTICUS, UNSPECIFIED EPILEPSY TYPE (HCC): ICD-10-CM

## 2019-08-22 PROCEDURE — 99215 OFFICE O/P EST HI 40 MIN: CPT | Performed by: PSYCHIATRY & NEUROLOGY

## 2019-08-22 PROCEDURE — 95970 ALYS NPGT W/O PRGRMG: CPT | Performed by: PSYCHIATRY & NEUROLOGY

## 2019-08-22 RX ORDER — LEVOCARNITINE 1 G/10ML
500 SOLUTION ORAL EVERY 12 HOURS
Qty: 352 ML | Refills: 5 | Status: SHIPPED | OUTPATIENT
Start: 2019-08-22 | End: 2019-11-26

## 2019-08-22 RX ORDER — DIAZEPAM 5 MG/ML
SOLUTION, CONCENTRATE ORAL
Qty: 30 ML | Refills: 0 | Status: SHIPPED | OUTPATIENT
Start: 2019-08-22 | End: 2019-10-02

## 2019-08-22 RX ORDER — CLOBAZAM 10 MG/1
TABLET ORAL
Qty: 15 TABLET | Refills: 5 | Status: SHIPPED | OUTPATIENT
Start: 2019-08-22 | End: 2019-11-26 | Stop reason: SDUPTHER

## 2019-08-22 RX ORDER — LEVETIRACETAM 100 MG/ML
1000 SOLUTION ORAL EVERY 8 HOURS
Qty: 946 ML | Refills: 5 | Status: SHIPPED | OUTPATIENT
Start: 2019-08-22 | End: 2019-11-26 | Stop reason: SDUPTHER

## 2019-08-22 RX ORDER — RUFINAMIDE 400 MG/1
1600 TABLET, FILM COATED ORAL EVERY 12 HOURS
Qty: 240 TABLET | Refills: 5 | Status: SHIPPED | OUTPATIENT
Start: 2019-08-22 | End: 2019-11-26 | Stop reason: SDUPTHER

## 2019-08-22 RX ORDER — TOPIRAMATE 50 MG/1
250 TABLET, FILM COATED ORAL EVERY 12 HOURS SCHEDULED
Qty: 300 TABLET | Refills: 5 | Status: SHIPPED | OUTPATIENT
Start: 2019-08-22 | End: 2019-11-26 | Stop reason: SDUPTHER

## 2019-08-22 NOTE — PROGRESS NOTES
Review of Systems    {LimROS-complete:92848}      Review of Systems   Constitutional: Negative  Negative for appetite change and fever  HENT: Negative  Negative for hearing loss, tinnitus, trouble swallowing and voice change  Eyes: Negative  Negative for photophobia and pain  Respiratory: Negative  Negative for shortness of breath  Cardiovascular: Negative  Negative for palpitations  Gastrointestinal: Negative  Negative for nausea and vomiting  Endocrine: Negative  Negative for cold intolerance and heat intolerance  Genitourinary: Negative  Negative for dysuria, frequency and urgency  Musculoskeletal: Negative  Negative for myalgias and neck pain  Skin: Negative  Negative for rash  Neurological: Positive for seizures  Negative for dizziness, tremors, syncope, facial asymmetry, speech difficulty, weakness, light-headedness, numbness and headaches  Hematological: Negative  Does not bruise/bleed easily  Psychiatric/Behavioral: Negative  Negative for confusion, hallucinations and sleep disturbance

## 2019-08-22 NOTE — PROGRESS NOTES
[facility]  Patient's Name: Emilia Alfredo   Patient's : 1980   Visit Type: follow-up  Referring MD / PCP:  Irma Heredia MD     Assessment:  Ms Mallory Rosa is a 46 y o  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, remote history of anoxic brain injury (however her MRI brain study is generally a normal MRI brain), h/o status epilepticus, severe intellectual disability, and frequent medication adverse effects  Her EEG has features of both generalized and focal epilepsy  She has a VNS due to intractable epilepsy  Ms Cinthia King epilepsy has been extremely difficult to control due to extremely frequent tonic seizures during sleep (at time could be every couple of minutes) along with medication side effects (mostly causing excessive sedation and liver toxicity)  Her focal seizures seem to occur from the bilateral temporal regions with rhythmic discharges, but no apparent clinical symptoms  She has an epileptic encephalopathy, which means that not only is she at risk for further cognitive decline from frequent seizures but from the underlying epilepsy  It is unclear what had triggered her most recent status epilepticus, there was a notable fluctuation in topiramate levels  It seems that she requires topiramate for seizure prevention  Epidiolex seems to have help reduced the amount of visible seizures  We have weaned her off of valproate (due to hyperammonia levels) and phenobarbital and Fycompa due to excessive sedation  It is unclear if Banzel and Levetiracetamhave been helpful  Since being back on Onfi, it seems that she is more somnolent during the daytime  Will recommend that we discontinue daytime dose of Onfi and limit it to only bedtime, which is generally when her seizures are most active (during sleep)  As divalproex has been discontinued, we can wean her off of levocarnitine  It is unlikely that the magnet swipes will help abort her breakthrough seizures    Her VNS is already set to rapid cycling  I discussed with the nurse the use of diazepam oral solution for breakthrough generalized tonic clonic seizures  I spent time discussing the difference in clinical features between Maira's typical tonic seizures and the more infrequent generalized tonic clonic seizures and the diazepam is meant only for the generalized tonic clonic seizure and not for the tonic seizures  Plan:   1 - continue with Levetiracetam 100mg/mL give 10mL every 8 hours  2 - continue with Topiramate 250mg twice a day  3 - continue Banzel 400mg give 4 tabs twice a day  4 - continue Epidiolex 100mg/mL 4 mL (400mg) twice a day   5 - decrease Onfi to 5mg at bedtime  6 - decrease Levocarnitine 1000mg/10mL to 500mg every 12 hours  7 - may use diazepam 5mg/mL oral solution, give 1mL by peg tube as needed for generalized tonic clonic seizure lasting more than 5 minutes  8 - follow-up in 3 months    Problem List Items Addressed This Visit        Nervous and Auditory    Lennox-Gastaut syndrome with tonic seizures (HCC) - Primary    Relevant Medications    topiramate (TOPAMAX) 50 MG tablet    rufinamide (BANZEL) 400 mg tablet    levOCARNitine (CARNITOR) 1 g/10 mL solution    levETIRAcetam (KEPPRA) 100 mg/mL oral solution    cloBAZam (ONFI) 10 MG tablet    cannabidiol (EPIDIOLEX) 100 mg/ml SOLN    diazepam (VALIUM) 5 MG/ML solution    Intractable epilepsy without status epilepticus (HCC)    Relevant Medications    topiramate (TOPAMAX) 50 MG tablet    rufinamide (BANZEL) 400 mg tablet    levETIRAcetam (KEPPRA) 100 mg/mL oral solution    cloBAZam (ONFI) 10 MG tablet    cannabidiol (EPIDIOLEX) 100 mg/ml SOLN        Chief Complaint:    Chief Complaint     Seizures        HPI:    Harish Suarez is a 46 y o  unknown handed female here for follow-up evaluation of intractable epilepsy in the setting of LGS  Transportation was 45 minutes late to her appointment        Interval History 8/22/2019  Since the last visit, we were trying to wean her off of valproic acid and discontinued it on 6/4/2019  However, she was admitted to hospital in June 2019 for status epilepticus  She was put on continuous EEG monitoring which showed very frequent tonic seizures lasting 15-30 seconds at a time, whether this is different from her baseline is difficult to determine  Her nursing home facility reported that there were multiple clinical seizures over the course of 25 minutes (every 5-6 minutes) associated with a period of apnea  Since she was on continuous EEG monitoring a number of medication trials were given  At one point it seems that lorazepam and more levetiracetam had improved seizures  However this was only temporary, valproic acid was restarted on 6/30/2019  There was a trial of Onfi due to response with lorazepam   Then there was an elevation on LFTs  Perampanel was also weaned off  Her tube feed was adjust to more protein and lower carbohydrates  She had a breakthrough seizure on 8/1/2019 (possibly in the setting of not getting her medications due to malfunctioning PEG tube)  She is here Jamie Rodrigues, who is familiar with her  She seems to be better, but she still does not stand up to walk  There are times she does seem to be excessively sleepy  I called [facility], spoke with her nurse Julito Colbert  She has not had any witnessed seizures  However, she is more subdued and very tired  She is not her usual self, which usually consist of her yelling and throwing her toys  Julito Colbert recalled that on the day she had multiple seizures, they swiped the VNS magnet but there was no cessation of seizure activity      AED/side effects/compliance:  Banzel 400mg give 4 tabs twice a day   Topiramate 250mg tab twice a day   Levetiracetam 100mg/mL give 10mL q8hrs  Epidiolex 400mg twice a day  Onfi 5mg twice a day  levocarnitine 1gm/10mL give 7 5mL three times a day and lactulose for VPA induced hyperammoniemia    Event/Seizure semiology:  Seizure semiology:  1  She was described to have drop attacks or spells with grunting sounds and falling to the floor  2  Her nurse commented on episodes of spasms or myoclonic jerking of the right arm  3  Generalized convulsions  4  Tonic seizures of eyes rolling back (mostly during sleep)    Prior Epilepsy History:  Collective epilepsy history 11/6/2014 was previously evaluated by Dr Jose Mireles including a hospitalization in February 2013 for status epilepticus, in the setting of missed doses of AEDs due to refusal to eat  She subsequently required anesthetic induced coma to stop her seizures and PEG tube was placed  She was seen almost every month for management of her seizures up until November 2013  She has medically refractory seizures and had a VNS placed possibly in the early 2000s and a generator changed in 2011  Unknown AEDs that were tried but felbamate is listed as an allergy  In 2011, her AEDs were clonazepam, levetiracetam, Depakote and Lamictal  Dr Jose Mireles had started [facility] in 2011  In 2012, Chaneta Pester was added with the reduction of clonazepam  There were difficulty with documenting seizure frequency; but seizures were no longer daily occurrences but there were clusters of seizures  There would be good periods and bad periods of seizure frequency  Dr Jose Mireles had been increasing the duty cycle of the VNS over the course of 2012 to 2013  Sometime between August 2013 and October 2013, topiramate was introduced  She was in status epilepticus in 2013, she was on Keppra, Banzel, Onfi, and Lamictal  She had an EEG at some point that showed multifocal spike-wave and background attenuation  She has intermittent agitation and day time somnolence  When she was hospitalized for status epilepticus, she was started on methylphenidate to help wake her up  Intake history November 2014:  VPA level 127  Her Valproic Acid dose was previously 250mg q6hrs based on Dr Iris Gandhi notes   Since July 2014, she has been receiving VPA 500mg q6hrs  She has not been hospitalized since September 2013  She was previously ambulatory with therapy  She spends most of her time sleeping for the past couple of months  She has also been receiving lactulose for elevated ammonia levels  (older drugs VPA, LVT, LMG, newer drugs added on since 2011 RFN, Onfi, TPM)     Summary of 2015: We decreased VPA from TDD 2000mg to 1000mg with increased agitation/combativeness, alternating days of exhaustion (no behavioral specialist has been involved)  are randomly reported by multiple staff members  Seizures are typically reported by CNA's or her one to one on the 3PM to 11PM shift  Seizures are described a brief events of eyes rolling back and arms going up in the air, followed by hair pulling or slapping herself  These seizures were reported as either sporadic or every other day episodes  There have been no documented grand mal seizures or drop attacks  She refuses to wear safety helmet, rips at her hair, and attempts to flip herself out of her chair and bed  Re Aid can walk briefly but walks on her toes, she frequently will allow herself to fall down when she does not want to walk  It does not appear that methylphenidate has been useful in maintaining her level of wakefulness during the day  VNS generator change on 11/3/2015  The Model 103 (serial [id number]) was replaced with Sevence Model 105, serial [id number]  Weaned off of levetiracetam due to multiple medications and agitation; by the end of 2016, she was down to -500  Summary of 2016:  Started with RFN 6616-6034, -250, CLB 0 5-0 5-25,  QID, -200 attempted to wean off of LEV and reduced the dose of VPA given that there were no reports of seizures and she was sedated, along with elevated ammonia levels  It was unclear as to how effective LEV was for her seizure control  When she missed a dose of TPM in September 2016, she had multiple seizures    We attempted to compensate for increase in seizures by increasing Onfi to 20-20; however, it seems that it made her more sedated  Summary of 2017  RFN [id number], -250, Onfi 20-20, -200,  q8hrs  She has been more somnolent  She continues to have tonic seizures when she is asleep, eyes rolling body, arms will tense up with some twitching, last a few seconds, but will recur  There are no seizures during the daytime  We attempted to wean off of VPA due to ammonia / somnolence; but there was an increase in tonic seizures (tonic stiffness, eyes rolling upwards, and subtle arm (right?) jerking)  Hospital admission 10/10-10/26/2017, due to seizures in setting of infection, was put on continuous video EEG monitoring to determine her seizure type, seizure frequency, and rapid medication adjustments  Rapid med changes: d/c'ed lamotrigine, restarted levetiracetam, attempted discontinuation of valproic acid (more seizures, so restarted), attempted reduction in Onfi, and starting Fycompa  The patient's seizures were mostly based during sleep, tonic seizures  Summary of 2018  Started with RFN [id number], PER 8, LEV 3373-5458, CLB 5-15, -250,  TID  Due to excessive somnolence Onfi was weaned off  Phenobarbital   She was on continuous EEG monitoring when she was in hospital for PNA that showed very frequent tonic seizures during sleep  VPA is causing hyperammoniemia  She has had more admissions for somnolence / lethargy, VPA was reduced for transaminitis  She was tested for inborn error of metabolism with plasma amino acid, urine organic acid, and acylcarnitine levels  There were nonspecific elevations in some amino acid or organic acid but no pattern consistent with a specific inborn error of metabolism  Elevated carnitine level was consistent with supplementation    Higher doses of phenobarbital may also contribute to sedation, phenobarbital was reduced to 20mg but on follow-up she was on 20mL of oral solution (80mg / day)  There is variable reporting in the frequency of tonic seizures (these are the eyes are rolling back and shaking, then stop, then happen again in 10-15 minutes)  Her level of alertness is also variable with erratic sleep cycle  Interval History 1/10/2019   TID, RFN 7995-9340, -200, LEV 1234-6246, PER 10, PB 40 qHS  In December 2018, Angelica Rizvi was admitted to hospital for hypoxia and fever found to have PNA with pleural effusion, septic shock and aspiration, she was subsequently intubated  She had an EEG that captured recurrent tonic seizures during sleep but this is consistently found in all of her other EEG studies  Phenobarbital was decreased to reduce sedation  Fycompa was increased to 10mg at bedtime  She has returned to her SNF, parents have noticed increase in seizure frequency (tonic seizures)  She also had an increase in AST/ALT levels  Her albumin was 1 3-2 2 in December 2018  Her CNA reports that Angelica Rizvi is no longer Angelica Rizvi, she is essentially bed ridden  She does not eat and does not walk  Angelica Rizvi usually participate in activities, walking, and eating food (if she was at a [facility] she would be able to eat a hamburger without difficulty)  I was able to speak to her father Queenie Hong and he reported that he too saw a significant decline in mental status  There was a phone call from Jesus Gandhi reporting an increase in seizure activity (brief episodes tonic-myoclonic jerking); but subsequent reports did not notice an increase in activity  She has not had a convulsive seizure  Interval History 1/22/2019   TID, RUF 1625-2862, -250, LEV 8742-3561, PER 10, PB 20  We had reduced phenobarbital due to excessive lethargy  However, she was admitted to hospital again for breakthrough seizures (convulsive seizure)  TPM was increased to previously prescribed 250mg BID  Ammonia level is persistently elevated    She is here with Vera who does not know the patient well to give any information  I called [facility] Fan[facility] and spoke with Trinh  On the day she was admitted to hospital, Fiorella Pena was getting washed and dressed with progressively more frequent seizures flailing in bed, ripping the alarms, seizure recurred every minute to a minute and a half, including convulsive type of seizures, cluster of seizures lasted 30-40 minutes (stiffening)  It continued until she arrived at the ED  There was no fever associated with her seizure cluster  She is more alert with phenobarbital reduction  Her parents have not completed the HIPAA form for Epidiolex, they have not been around  They do not have the form in her chart  The seizure clusters eventually decreased to more baseline activity, witnessed 1-3 seizures during the day  She appears to be more awake, alert, will yell and talking voice, and communicate as best as she can, and participate in some activity  Interval History 2/14/2019   TID, RUF 5593-7679, -250, LEV 2577-3254, PER 10  She is here with a nursing aid who has known her for a long time but has not worked directly with her  She is still very lethargic, not eating any thing by mouth  She continues to have a lot of seizures, eyes rolling back, body tension  I spoke with Oneil Vance, he did fill out the HIPAA form for Epidiolex  I spoke with nursing supervisor, she may get roudy for the last couple of days  She is not ambulatory or eat  Current weight 98lbs  Interval History 4/25/2019  -125, RUF 9368-4104, -250, LEV 1729-3083, PER 8, -200  Patient saw Kuldip Avila on 3/18/2019 for follow-up  Patient is more alert/awake  Unclear if seizures are any less frequent; however, during the office visit there were about 8 tonic seizures lasting 15-20 seconds  Epidiolex was increased to 3mL twice a day, VPA decreased to 125-125, PER decreased to 8mg daily  However, based on the [facility], Epidiolex was still at 2mL twice a day  I called [facility], I spoke with Skippy Meres  She generally is more in alert in the afternoon; but periods of wakefulness is hit and miss  She is no longer on a one to one  She was transferred to a different side of the nursing home for the past month  There have been no reported seizures since has been on the new side  At least no grand mal seizure type  She is here with an aide who is familiar with Mario Haq; her aide believes that Mario Haq continues to have a few tonic seizures during the day  Her aide also reports that there are periods when Mario Haq is more awake, like the other day she was getting a shower and she was playing with the shower head  Special Features  Status epilepticus: yes  Self Injury Seizures: No  Precipitating Factors: menstrual cycle? ?     Epilepsy Risk Factors:  Abnormal pregnancy: unknown  Abnormal birth/: unknown  Abnormal Development: unknown developmental history  Febrile seizures, simple: unknown  Febrile seizures, complex: unknown  CNS infection: No  Mental retardation: Yes  Cerebral palsy: Yes  Head injury (moderate/severe): possibly anoxic brain injury  CNS neoplasm: No  CNS malformation: No  Neurosurgical procedure: No  Stroke: No  Alcohol abuse: No  Drug abuse: No  Family history Sz/epilepsy: unknown    Prior AEDs:  Rufinamide  Clobazam (excessive sedation)  Clonazepam  Levetiracetam (unclear if beneficial)  Lamotrigine (unclear if beneficial)  Topiramate (missed doses caused breakthrough seizures)  Valproate (as medication was weaned off, there were increased seizures)  Fycompa (excess sedation)  Felbamate (listed as a drug allergy)  Phenobarbital (contributing to sedation)  Epidiolex    Prior workup:  x   Imaging:  CT head 2013  Normal CT of brain    CT head 2018  No acute intracranial pathology    3/20/2019  MRI brain w/wo contrast  Normal MR brain study  Symmetric hippocampal formations    EEGs:  Continuous video EEG monitoring 10/13 - 10/15/2017  Frequent generalized spike/polyspike-slow wave complexes during sleep  At times there are independent right more than left midtemporal spikes and bitemporal spikes    Innumerable generalized tonic seizures during sleep, generalized 1 Hz period discharges, that would become continuous for 30 seconds or generalized rhythmic alpha-beta discharges, associated with patient becoming rigid, tonic posturing with arms elevated and tense with subtle jerking of the upper body, eyes opening with upward deviation  Dr Carolyne Wei reported 4 ictal patterns:  1 - 1-3 seconds of diffuse electrodecrement then 2-7 seconds of fast activity then 2Hz spike wave discharges (no clinical manifestation)  2 - same as #1, clinically accompanied by posturing of the arms, then clonic jerking  3 - diffuse low fast activity without progressing to spike-wave discharges  4 - 2 Hz generalized discharges for 2-3 minutes with no clonical manifestations  By 10/15/2017 - the tonic clonic seizures were less frequent    Continuous video EEG monitoring 10/18 - 10/25/2017  Diffuse background slowing with bursts of arrhythmic generalized spike wave discharges, with shifting lateralization, and independent left and  right temporal spikes  Mild eyelid myoclonia associated with brief bursts of 2-2 5 Hz generalized discharges  Tonic seizures with eelctrodecrement  There were innumerable tonic seizures; however by 10/25/2017 the frequency of these seizures did decrease in frequency  Continuous video EEG monitoring 12/1-12/5/2017  There are innumerable tonic seizures that are generally less than one minute in duration (30-40 seconds), these consists of subtle eye opening and tonic stiffening of arms, if longer then whole body stiffening with clonic jerking movements  These almost always occur exclusively out of sleep    These consist of 2-2 5 Hz generalized spike-slow wave complexes with generalized attenuation of activity (desynchronization) or paroxysmal fast activity  Per 24 hours count of seizures could be      12/19/2018 routine study  Frequent recurrent tonic seizures associated with paroxysmal generalized fast activity then generalized rhythmic discharges associated with eyes upward deviation, and myoclonic/clonic jerking of the upper body, excess beta activity  6/28-7/3/2019 continuous video EEG monitoring  Frequent clusters of tonic seizures (body rigidity, head flexions, eyes go up with dysconjugate gaze, and clonic activity of the arms for a few seconds), these are associated with GPFA and rhythmic spike-slow waves  There are also bilateral temporal focal epileptiform discharges      Labs:  Component      Latest Ref Rng & Units 7/26/2019 8/2/2019   WBC      4 31 - 10 16 Thousand/uL  8 85   Red Blood Cell Count      3 81 - 5 12 Million/uL  4 11   Hemoglobin      11 5 - 15 4 g/dL  13 0   HCT      34 8 - 46 1 %  41 5   Platelet Count      260 - 390 Thousands/uL  266   Sodium      136 - 145 mmol/L 143 138   Potassium      3 5 - 5 3 mmol/L 3 6 3 4 (L)   Chloride      100 - 108 mmol/L 107 106   CO2      21 - 32 mmol/L 24 20 (L)   Anion Gap      4 - 13 mmol/L 12 12   BUN      5 - 25 mg/dL 15 6   Creatinine      0 60 - 1 30 mg/dL 0 58 (L) 0 33 (L)   Glucose, Random      65 - 140 mg/dL 112 83   Calcium      8 3 - 10 1 mg/dL 9 8 8 4   AST      5 - 45 U/L 38 35   ALT      12 - 78 U/L 59 40   Alkaline Phosphatase      46 - 116 U/L 148 (H) 124 (H)   Total Protein      6 4 - 8 2 g/dL 8 5 (H) 6 7   Albumin      3 5 - 5 0 g/dL 4 1 3 0 (L)   TOTAL BILIRUBIN      0 20 - 1 00 mg/dL 0 40 0 40   eGFR      ml/min/1 73sq m 117 141   Total CK      26 - 192 U/L  27   LACTIC ACID      0 5 - 2 0 mmol/L  0 4 (L)     Component      Latest Ref Rng & Units 6/27/2019 6/29/2019 7/2/2019   TOPIRAMATE LEVEL      2 0 - 25 0 ug/mL 4 3  16 5   LEVETIRACETA (KEPPRA)      10 0 - 40 0 ug/mL 26 4     Rufinamide (Banzel)      ug/mL  29 1    Ammonia      11 - 35 umol/L   34     Component TOPIRAMATE LEVEL   Latest Ref Rng & Units 2 0 - 25 0 ug/mL   8/20/2018 8 1   9/6/2018 9 8   6/27/2019 4 3   7/2/2019 16 5     General exam   Blood pressure (!) 72/54, pulse 95  Appearance: chronically ill, eyes are open for a longer period of time, attending to her toy and her nurse  Carotids: n/a  Cardiovascular: regular rate and rhythm  Pulmonary: clear to auscultation   Abdomen: soft to palpation  Extremities: no edema    HEENT: lips are dry and anicteric   Fundoscopy: not assessed    Mental status  Orientation: nonverbal  Fund of Knowledge: nonverbal   Attention and Concentration: nonverbal  Current and Remote Memory:nonverbal  Language: nonverbal    Cranial Nerves  CN 1: not tested  CN 2: pupils equal round reactive to direct and consenual light   CN 3, 4, 6: dysconjugate eyes, mild nystagmus with movement; left eye is more exotropic  CN 5:not assessed  CN 7:muscles of facial expression are symmetric and corneal reflex is intact symmetrically  CN 8:not assessed  CN 9, 10:not assessed  CN 11:not assessed  CN 12:not assessed    Motor:  Bulk, Tone: thin muscle build, relatively hypotonic tone  Pronation: not assessed  Strength:  There appears to be symmetric movements of the legs to withdrawal, and use of both arms in manipulating a toy; she does not follow commands for confrontational testing    Sensory:  Lighttouch: not assessed due to cognitive impairment    Coordination: Unable to test    Reflexes: biceps and triceps 1+/4, knees and ankles 0/4, left toe extensor, right toe mute    Past Medical/Surgical History:  Patient Active Problem List   Diagnosis    Lennox-Gastaut syndrome with tonic seizures [facility] [address] [facility]    Underweight    Intellectual disability    Hyperammonemia ([facility] [address] [facility]    Osteoporosis    Onychomycosis    Hyperkeratosis    Intractable epilepsy without status epilepticus ([facility] [address] [facility]    Somnolence    Transaminitis    Incontinence    Skin breakdown    MRSA colonization    Hypoalbuminemia    Right atrial enlargement    Hypophosphatemia    Sedated due to multiple medications    Metabolic acidosis, increased anion gap (IAG)    Breast mass    S/P placement of VNS (vagus nerve stimulation) device    Protein calorie malnutrition (HCC)    Dislodged gastrostomy tube (HCC)    Acute cystitis with hematuria    Fatty infiltration of liver     Past Medical History:   Diagnosis Date    ADHD     Anoxic brain damage (HCC)     Autistic disorder     Breakthrough seizure ([facility] [address] ) 8/1/2019    Hyperammonemia (HCC)     Hyperkeratosis     Hypotension     Intellectual disability     Intellectual disability     Lennox-Gastaut syndrome with tonic seizures (HCC)     Lethargy     Liver enzyme elevation     Onychomycosis     Osteoporosis     Osteoporosis     Psychiatric disorder     anxiety     Past Surgical History:   Procedure Laterality Date    ABDOMINAL SURGERY      CARDIAC PACEMAKER PLACEMENT      vns implant l chest    IR THORACENTESIS  12/17/2018    JEJUNOSTOMY FEEDING TUBE      PEG TUBE PLACEMENT       Past Psychiatric History:  History of behavioral agitation but she is no longer on a one to one because she is generally too sedated      Medications:    Current Outpatient Medications:     acetaminophen (TYLENOL) 325 mg tablet, 2 tablets (650 mg total) by Per G Tube route every 6 (six) hours as needed for mild pain, Disp: 30 tablet, Rfl: 0    bisacodyl (BISCOLAX) 10 mg suppository, Insert 10 mg into the rectum daily at bedtime as needed for constipation (if no BM day #4), Disp: , Rfl:     cannabidiol (EPIDIOLEX) 100 mg/ml SOLN, 4 mL (400 mg total) by Per G Tube route 2 (two) times a day, Disp: 240 mL, Rfl: 5    cloBAZam (ONFI) 10 MG tablet, Give half a tab at bedtime, Disp: 15 tablet, Rfl: 5    levETIRAcetam (KEPPRA) 100 mg/mL oral solution, 10 mL (1,000 mg total) by Per G Tube route every 8 (eight) hours, Disp: 946 mL, Rfl: 5   levOCARNitine (CARNITOR) 1 g/10 mL solution, 5 mL (500 mg total) by Per G Tube route every 12 (twelve) hours, Disp: 352 mL, Rfl: 5    magnesium hydroxide (MILK OF MAGNESIA) 400 mg/5 mL oral suspension, Take 30 mL by mouth daily as needed for constipation , Disp: , Rfl:     MELATONIN PO, 6 mg by Per G Tube route daily at bedtime, Disp: , Rfl:     Multiple Vitamins-Minerals (MULTIVITAMIN WITH IRON-MINERALS) liquid, 5 mL by Per G Tube route daily, Disp: , Rfl:     Probiotic Product (ALEXANDER-BID PROBIOTIC PO), 1 tablet by Per G Tube route daily  , Disp: , Rfl:     rufinamide (BANZEL) 400 mg tablet, 4 tablets (1,600 mg total) by Per G Tube route every 12 (twelve) hours, Disp: 240 tablet, Rfl: 5    topiramate (TOPAMAX) 50 MG tablet, 5 tablets (250 mg total) by Per G Tube route every 12 (twelve) hours, Disp: 300 tablet, Rfl: 5    diazepam (VALIUM) 5 MG/ML solution, Give 1mL by PEG tube as needed for generalized tonic clonic seizure lasting more than 5 minutes, Disp: 30 mL, Rfl: 0    Allergies: Allergies   Allergen Reactions    Felbamate        Family history:  History reviewed  No pertinent family history  There is no family history of seizure, epilepsy or developmental delay  Social History  Living situation:  Resident of [facility]   reports that she has never smoked  She has never used smokeless tobacco  She reports that she drank alcohol  She reports that she does not use drugs  Review of Systems  A review of at least 12 organ/systems was obtained by the medical assistant and reviewed by me, including additional positives/negatives:  Neurological: positive for seizures    Decision making was of high-complexity due to the patient's high risk condition (seizures), psychiatric and neuropsychological comorbidities, behavioral problems, memory and cognitive problems and medication side effects          Neurology/Epilepsy Procedure Note    VNS Interrogation performed on 8/22/2019    Model: [id number]  S/N: [id number]  Date of implant: 11/3/2015    Current settings:  Output Current 2 mAmp   Signal Frequency 20 Hz   Pulse Width 250 microsec   Signal On Time 21 sec   Signal Off Time 0 8 min     Magnet settings:  Mag Output 2 25 mAmp   Pulse Width 500 microsec   Mag On Time 60 sec     Diagnostics:  Communication OK  Output current 2mAmp  Lead Impedance OK  Impedance value 1961 Ohms  IFI No  Battery indicator 25-50%      Diagnostics:  Lifetime Magnet activation 40  % stimulation 41% duty cycle    The [place] was queried  No red flags were identified  The patient is low risk for abuse of the prescribed diazepam medication  Safe to proceed with prescription

## 2019-08-22 NOTE — PATIENT INSTRUCTIONS
Plan:   1 - continue with Levetiracetam 100mg/mL give 10mL every 8 hours  2 - continue with Topiramate 250mg twice a day  3 - continue Banzel 400mg give 4 tabs twice a day  4 - continue Epidiolex 100mg/mL 4 mL (400mg) twice a day   5 - decrease Onfi to 5mg at bedtime  6 - decrease Levocarnitine 1000mg/10mL to 500mg every 12 hours  7 - may use diazepam 5mg/mL oral solution, give 1mL by peg tube as needed for generalized tonic clonic seizure lasting more than 5 minutes  8 - follow-up in 3 months    Please distinguish between tonic seizures (body tension, eyes rolling up, subtle myoclonic jerking of limbs) from generalized tonic clonic seizure (whole body rigidity with arms and legs hyperextended with rhythmic clonic jerking of the limbs causing respiratory depression)  Use diazepam for generalized tonic clonic seizure not for tonic seizures

## 2019-08-22 NOTE — LETTER
2019    RE: Pam Rey  : 1980     To whom it may concern:    Ms Esperanza Angulo is under my care at [facility] [address] for her intractable epilepsy  It has come to my attention that there are new regulations regarding medications and when patient are discharged from the nursing home  However, Ms Bull intractable epilepsy requires that she takes medications that are frequently not available at many (if not all) hospitals  These medications are commonly nonformulary and may take up to 7 days to be obtained in a hospital pharmacy  If she miss doses of her antiepileptic medication it increases her risk for status epilepticus, which can be life threatening  If she is to be admitted to a hospital, please make sure that the following antiepileptic medications are brought to the hospital where she is admitted  These are: Onfi (clobazam), Banzel (Rufinamide), and Epidiolex (cannabidiol)  Levetiracetam and Topiramate are generally available at most hospital pharmacies  If you have any questions or concerns, please don't hesitate to call my office at [phone]           Sincerely,          Joshua John MD, PhD

## 2019-08-23 NOTE — PLAN OF CARE
Problem: Nutrition/Hydration-ADULT  Goal: Nutrient/Hydration intake appropriate for improving, restoring or maintaining nutritional needs  Monitor and assess patient's nutrition/hydration status for malnutrition (ex- brittle hair, bruises, dry skin, pale skin and conjunctiva, muscle wasting, smooth red tongue, and disorientation)  Collaborate with interdisciplinary team and initiate plan and interventions as ordered  Monitor patient's weight and dietary intake as ordered or per policy  Utilize nutrition screening tool and intervene per policy  Determine patient's food preferences and provide high-protein, high-caloric foods as appropriate       INTERVENTIONS:  - Monitor oral intake, urinary output, labs, and treatment plans  - Assess nutrition and hydration status and recommend course of action  - Evaluate amount of meals eaten  - Assist patient with eating if necessary   - Allow adequate time for meals  - Recommend/ encourage appropriate diets, oral nutritional supplements, and vitamin/mineral supplements  - Order, calculate, and assess calorie counts as needed  - Recommend, monitor, and adjust tube feedings and TPN/PPN based on assessed needs  - Assess need for intravenous fluids  - Provide specific nutrition/hydration education as appropriate  - Include patient/family/caregiver in decisions related to nutrition   Outcome: Progressing Mirvaso Counseling: Mirvaso is a topical medication which can decrease superficial blood flow where applied. Side effects are uncommon and include stinging, redness and allergic reactions.

## 2019-09-06 ENCOUNTER — HOSPITAL ENCOUNTER (EMERGENCY)
Facility: HOSPITAL | Age: 39
Discharge: HOME | End: 2019-09-06
Attending: EMERGENCY MEDICINE | Admitting: EMERGENCY MEDICINE
Payer: MEDICARE

## 2019-09-06 ENCOUNTER — APPOINTMENT (EMERGENCY)
Dept: RADIOLOGY | Facility: HOSPITAL | Age: 39
End: 2019-09-06
Payer: MEDICARE

## 2019-09-06 VITALS
TEMPERATURE: 97.1 F | BODY MASS INDEX: 19.93 KG/M2 | SYSTOLIC BLOOD PRESSURE: 102 MMHG | HEART RATE: 88 BPM | DIASTOLIC BLOOD PRESSURE: 76 MMHG | WEIGHT: 102.07 LBS | RESPIRATION RATE: 18 BRPM | OXYGEN SATURATION: 98 %

## 2019-09-06 DIAGNOSIS — K94.23 PEG TUBE MALFUNCTION (HCC): Primary | ICD-10-CM

## 2019-09-06 DIAGNOSIS — Z46.59 ENCOUNTER FOR CARE RELATED TO FEEDING TUBE: ICD-10-CM

## 2019-09-06 PROCEDURE — 99285 EMERGENCY DEPT VISIT HI MDM: CPT | Performed by: EMERGENCY MEDICINE

## 2019-09-06 PROCEDURE — 74018 RADEX ABDOMEN 1 VIEW: CPT

## 2019-09-06 PROCEDURE — 99283 EMERGENCY DEPT VISIT LOW MDM: CPT

## 2019-09-06 PROCEDURE — NC001 PR NO CHARGE: Performed by: EMERGENCY MEDICINE

## 2019-09-06 PROCEDURE — 96372 THER/PROPH/DIAG INJ SC/IM: CPT

## 2019-09-06 PROCEDURE — 43762 RPLC GTUBE NO REVJ TRC: CPT | Performed by: EMERGENCY MEDICINE

## 2019-09-06 RX ORDER — LORAZEPAM 2 MG/ML
2 INJECTION INTRAMUSCULAR ONCE
Status: COMPLETED | OUTPATIENT
Start: 2019-09-06 | End: 2019-09-06

## 2019-09-06 RX ORDER — OLANZAPINE 10 MG/1
10 INJECTION, POWDER, LYOPHILIZED, FOR SOLUTION INTRAMUSCULAR ONCE
Status: COMPLETED | OUTPATIENT
Start: 2019-09-06 | End: 2019-09-06

## 2019-09-06 RX ADMIN — OLANZAPINE 10 MG: 10 INJECTION, POWDER, FOR SOLUTION INTRAMUSCULAR at 02:41

## 2019-09-06 RX ADMIN — LORAZEPAM 2 MG: 2 INJECTION INTRAMUSCULAR; INTRAVENOUS at 01:51

## 2019-09-06 NOTE — ED PROVIDER NOTES
History  Chief Complaint   Patient presents with    Feeding Tube Problem     As per Martha Veliz at [facility] pt's feeding tube that was placed here in the ED on 19 is cracked and leaking  Medical Problem   Location:  Abdominal area  Quality:  G-tube malfunctioning  Severity:  Mild  Onset quality:  Sudden  Duration:  1 day  Timing:  Constant  Progression:  Unchanged  Chronicity:  Recurrent  Context:  Self-discontinuing  Relieved by:  Nothing  Worsened by:  Nothing  Ineffective treatments:  None tried  Associated symptoms: no abdominal pain, no chest pain, no congestion, no cough, no diarrhea, no fatigue, no fever, no headaches, no myalgias, no nausea, no rash, no shortness of breath, no vomiting and no wheezing         Pt presents via EMS from 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 Taking?    [address] [place] [address] (Specify) Yes No   Si mg by Per G Tube route daily at bedtime   Multiple Vitamins-Minerals (MULTIVITAMIN WITH IRON-MINERALS) liquid  Outside Facility (Specify) Yes No   Si mL by Per G Tube route daily   Probiotic Product (ALEXANDER-BID PROBIOTIC PO)  Outside Facility (Specify) Yes No   Si tablet by Per G Tube route daily     acetaminophen (TYLENOL) 325 mg tablet   No No   Si tablets (650 mg total) by Per G Tube route every 6 (six) hours as needed for mild pain   bisacodyl (BISCOLAX) 10 mg suppository  Outside Facility (Specify) Yes No   Sig: Insert 10 mg into the rectum daily at bedtime as needed for constipation (if no BM day #4)   cannabidiol (EPIDIOLEX) 100 mg/ml SOLN   No No   Si mL (400 mg total) by Per G Tube route 2 (two) times a day   cloBAZam (ONFI) 10 MG tablet   No No   Sig: Give half a tab at bedtime   diazepam (VALIUM) 5 MG/ML solution   No No   Sig: Give 1mL by PEG tube as needed for generalized tonic clonic seizure lasting more than 5 minutes   levETIRAcetam (KEPPRA) 100 mg/mL oral solution   No No   Sig: 10 mL (1,000 mg total) by Per G Tube route every 8 (eight) hours   levOCARNitine (CARNITOR) 1 g/10 mL solution   No No   Si mL (500 mg total) by Per G Tube route every 12 (twelve) hours   magnesium hydroxide (MILK OF MAGNESIA) 400 mg/5 mL oral suspension  Outside Facility (Specify) Yes No   Sig: Take 30 mL by mouth daily as needed for constipation    rufinamide (BANZEL) 400 mg tablet   No No   Si tablets (1,600 mg total) by Per G Tube route every 12 (twelve) hours   topiramate (TOPAMAX) 50 MG tablet   No No   Si tablets (250 mg total) by Per G Tube route every 12 (twelve) hours      Facility-Administered Medications: None       Past Medical History:   Diagnosis Date    ADHD     Anoxic brain damage (HCC)     Autistic disorder     Breakthrough seizure ([facility] [address] ) 2019    Hyperammonemia (HCC)     Hyperkeratosis     Hypotension     Intellectual disability     Lennox-Gastaut syndrome with tonic seizures (HCC)     Lethargy     Liver enzyme elevation     Onychomycosis     Osteoporosis     Osteoporosis        Past Surgical History:   Procedure Laterality Date    ABDOMINAL SURGERY      CARDIAC PACEMAKER PLACEMENT      vns implant l chest    IR THORACENTESIS  2018    JEJUNOSTOMY FEEDING TUBE      PEG TUBE PLACEMENT         History reviewed  No pertinent family history  I have reviewed and agree with the history as documented      Social History     Tobacco Use    Smoking status: Never Smoker    Smokeless tobacco: Never Used   Substance Use Topics    Alcohol use: Not Currently    Drug use: No        Review of Systems   Constitutional: Negative for activity change, appetite change, diaphoresis, fatigue and fever  HENT: Negative for congestion, facial swelling, mouth sores and trouble swallowing  Eyes: Negative for photophobia, discharge and visual disturbance  Respiratory: Negative for apnea, cough, shortness of breath and wheezing  Cardiovascular: Negative for chest pain and leg swelling  Gastrointestinal: Negative for abdominal pain, constipation, diarrhea, nausea and vomiting  Endocrine: Negative for heat intolerance and polydipsia  Genitourinary: Negative for dysuria, flank pain, frequency and hematuria  Musculoskeletal: Negative for back pain, gait problem, myalgias and neck pain  Skin: Negative for rash and wound  Allergic/Immunologic: Negative for immunocompromised state  Neurological: Negative for dizziness, syncope, weakness, light-headedness and headaches  Hematological: Negative for adenopathy  Psychiatric/Behavioral: Negative for agitation, confusion and self-injury  The patient is not nervous/anxious  Physical Exam  Physical Exam   Constitutional: She appears well-developed and well-nourished  No distress  HENT:   Head: Normocephalic and atraumatic  Right Ear: External ear normal    Left Ear: External ear normal    Nose: Nose normal    Mouth/Throat: Oropharynx is clear and moist  No oropharyngeal exudate  Eyes: Pupils are equal, round, and reactive to light  Conjunctivae and EOM are normal  Right eye exhibits no discharge  Left eye exhibits no discharge  No scleral icterus  Neck: Normal range of motion  Neck supple  No JVD present  No tracheal deviation present  No thyromegaly present  Cardiovascular: Normal rate, regular rhythm and normal heart sounds  No murmur heard  Pulmonary/Chest: Effort normal and breath sounds normal  No stridor  No respiratory distress  She has no wheezes  She has no rales  She exhibits no tenderness  Abdominal: Soft  Bowel sounds are normal  She exhibits no distension and no mass  There is no tenderness  There is no rebound and no guarding  Pt has a 4 0cm 8fr Marty tube that is in appropriate place  It is currently not leaking  Musculoskeletal: Normal range of motion  She exhibits no edema, tenderness or deformity  Lymphadenopathy:     She has no cervical adenopathy  Neurological: She is alert  She has normal reflexes  She displays normal reflexes  No cranial nerve deficit  She exhibits normal muscle tone  Coordination normal    Pt is non-verbal and does not follow commands   Skin: Skin is warm and dry  No rash noted  She is not diaphoretic  No erythema  No pallor  Psychiatric: She has a normal mood and affect  Her behavior is normal  Judgment and thought content normal    Nursing note and vitals reviewed  Vital Signs  ED Triage Vitals [09/06/19 0022]   Temperature Pulse Respirations Blood Pressure SpO2   (!) 97 1 °F (36 2 °C) 74 18 93/57 96 %      Temp Source Heart Rate Source Patient Position - Orthostatic VS BP Location FiO2 (%)   Temporal Monitor Lying Left arm --      Pain Score       No Pain           Vitals:    09/06/19 0400 09/06/19 0500 09/06/19 0600 09/06/19 0630   BP:       Pulse: 88 97 92 87   Patient Position - Orthostatic VS:             Visual Acuity      ED Medications  Medications   LORazepam (ATIVAN) 2 mg/mL injection 2 mg (2 mg Intramuscular Given 9/6/19 0151)   OLANZapine (ZyPREXA) IM injection 10 mg (10 mg Intramuscular Given 9/6/19 0241)       Diagnostic Studies  Results Reviewed     None                 No orders to display              Procedures  Procedures       ED Course       6:59 AM - I spoke w/ Dr Kaushik Yang who agrees to further eval and tx the patient  MDM  Number of Diagnoses or Management Options  Diagnosis management comments: IMP: malfunctioning g-tube that will need replacing  Doubt intra-abdominal obstruction or infection  Plan: Will replace tube and verify placement  Pt will f/up w/ her pcp      Risk of Complications, Morbidity, and/or Mortality  Presenting problems: high  Diagnostic procedures: low  Management options: low    Patient Progress  Patient progress: improved      Disposition  Final diagnoses:   None     ED Disposition     None      Follow-up Information    None         Patient's Medications   Discharge Prescriptions    No medications on file     No discharge procedures on file      ED Provider  Electronically Signed by           Susi Coleman DO  09/06/19 0269

## 2019-09-06 NOTE — ED NOTES
Per dr ornelas, network  attempting to locate replacement tube, if not available, pt for IR in a m       Zannie Medicine, RN  09/06/19 9520

## 2019-09-06 NOTE — ED PROVIDER NOTES
History  Chief Complaint   Patient presents with    Feeding Tube Problem     As per Jones Pica at [facility] pt's feeding tube that was placed here in the ED on 19 is cracked and leaking  Feeding Tube Problem       Prior to Admission Medications   Prescriptions Last Dose Informant Patient Reported? Taking?    [address] [place] [address] (Specify) Yes No   Si mg by Per G Tube route daily at bedtime   Multiple Vitamins-Minerals (MULTIVITAMIN WITH IRON-MINERALS) liquid  Outside Facility (Specify) Yes No   Si mL by Per G Tube route daily   Probiotic Product (ALEXANDER-BID PROBIOTIC PO)  Outside Facility (Specify) Yes No   Si tablet by Per G Tube route daily     acetaminophen (TYLENOL) 325 mg tablet   No No   Si tablets (650 mg total) by Per G Tube route every 6 (six) hours as needed for mild pain   bisacodyl (BISCOLAX) 10 mg suppository  Outside Facility (Specify) Yes No   Sig: Insert 10 mg into the rectum daily at bedtime as needed for constipation (if no BM day #4)   cannabidiol (EPIDIOLEX) 100 mg/ml SOLN   No No   Si mL (400 mg total) by Per G Tube route 2 (two) times a day   cloBAZam (ONFI) 10 MG tablet   No No   Sig: Give half a tab at bedtime   diazepam (VALIUM) 5 MG/ML solution   No No   Sig: Give 1mL by PEG tube as needed for generalized tonic clonic seizure lasting more than 5 minutes   levETIRAcetam (KEPPRA) 100 mg/mL oral solution   No No   Sig: 10 mL (1,000 mg total) by Per G Tube route every 8 (eight) hours   levOCARNitine (CARNITOR) 1 g/10 mL solution   No No   Si mL (500 mg total) by Per G Tube route every 12 (twelve) hours   magnesium hydroxide (MILK OF MAGNESIA) 400 mg/5 mL oral suspension  Outside Facility (Specify) Yes No   Sig: Take 30 mL by mouth daily as needed for constipation    rufinamide (BANZEL) 400 mg tablet   No No   Si tablets (1,600 mg total) by Per G Tube route every 12 (twelve) hours   topiramate (TOPAMAX) 50 MG tablet   No No   Si tablets (250 mg total) by Per G Tube route every 12 (twelve) hours      Facility-Administered Medications: None       Past Medical History:   Diagnosis Date    ADHD     Anoxic brain damage (HCC)     Autistic disorder     Breakthrough seizure ([address] ) 8/1/2019    Hyperammonemia (HCC)     Hyperkeratosis     Hypotension     Intellectual disability     Lennox-Gastaut syndrome with tonic seizures (HCC)     Lethargy     Liver enzyme elevation     Onychomycosis     Osteoporosis     Osteoporosis        Past Surgical History:   Procedure Laterality Date    ABDOMINAL SURGERY      CARDIAC PACEMAKER PLACEMENT      vns implant l chest    IR THORACENTESIS  12/17/2018    JEJUNOSTOMY FEEDING TUBE      PEG TUBE PLACEMENT         History reviewed  No pertinent family history  I have reviewed and agree with the history as documented  Social History     Tobacco Use    Smoking status: Never Smoker    Smokeless tobacco: Never Used   Substance Use Topics    Alcohol use: Not Currently    Drug use: No        Review of Systems    Physical Exam  Physical Exam    Vital Signs  ED Triage Vitals [09/06/19 0022]   Temperature Pulse Respirations Blood Pressure SpO2   (!) 97 1 °F (36 2 °C) 74 18 93/57 96 %      Temp Source Heart Rate Source Patient Position - Orthostatic VS BP Location FiO2 (%)   Temporal Monitor Lying Left arm --      Pain Score       No Pain           Vitals:    09/06/19 0600 09/06/19 0630 09/06/19 0815 09/06/19 1038   BP:   93/73 102/76   Pulse: 92 87 86 88   Patient Position - Orthostatic VS:   Lying Lying         Visual Acuity      ED Medications  Medications   LORazepam (ATIVAN) 2 mg/mL injection 2 mg (2 mg Intramuscular Given 9/6/19 0151)   OLANZapine (ZyPREXA) IM injection 10 mg (10 mg Intramuscular Given 9/6/19 0241)       Diagnostic Studies  Results Reviewed     None                 XR abdomen 1 view kub   Final Result by Bruno Morales MD (09/06 0908)      PO contrast within the stomach, no extravasation  Workstation performed: [id number]         XR abdomen 1 view kub   Final Result by Manuel Osorio MD (09/06 0908)      PO contrast within the stomach, no extravasation  Workstation performed: [id number]                    Procedures  Gastrostomy tube replacement  Date/Time: 9/6/2019 8:10 AM  Performed by: Vanessa Gillette DO  Authorized by: Vanessa Gillette DO     Assisting Provider(s): Yes (comment) Nancy Ritchie RN; Davidson Savage PCT)    Indications:     Indications:  Replacement of leaking gastrostomy tube  Pre-procedure details:     Skin preparation:  ChloraPrep  Anesthesia (see MAR for exact dosages): Anesthesia method:  None  Procedure Detail:     Equipment used:  Jejunostomy tube replacement kit    Procedure note (site, laterality, method, findings): After an appropriate time-out and verifying the site and procedure, the patient was cleaned and prepped in the usual sterile fashion  The external connector to the G-tube was removed and replaced with the identical component from the jejunostomy kit; the system was then flushed with 15 ml of saline without visible leakage though the tube system or at the gastrostomy tract  This was repeated again without any leakage  The previous connector was then flushed with saline; leakage was visible from just distal to the proximal-most portion of the tube  This was thought to be the leakage identified by the [facility] staff  No manipulation or adjustment of the G-tube itself was necessary  A KUB was then obtained after injection of dilute contrast material demonstrating appropriate intra-gastric placement and continuity of flow throughout the tube system  Post-procedure details:     Patient tolerance of procedure: Tolerated well, no immediate complications           ED Course  ED Course as of Sep 06 1055   Fri Sep 06, 2019   1833 Accepted sign-out from Dr Cathryne Mohs at this time   Patient case reviewed including hx/exam and diagnostic workup thus far  Pending G-tube replacement by IR today (Dr Luz Bryan) with subsequent return to nursing home  2426 Informed by Mohit Alvares RN (from IR) that IR will not be able to perform tube exchange until 1300 at the earliest; the tube used most recently in tube exchange is available and was brought to ED for bedside replacement  3710 G-tube connection replacement was completed as per procedure note  Please refer to note for further details  EMS contacted for return to nursing home; ETA [phone]  MDM    Disposition  Final diagnoses:   PEG tube malfunction ([facility] [address] )   Encounter for care related to feeding tube     Time reflects when diagnosis was documented in both MDM as applicable and the Disposition within this note     Time User Action Codes Description Comment    9/6/2019  8:29 AM Glosilvestre Lagunass Add [K94 23] PEG tube malfunction ([facility] [address] )     9/6/2019  8:29 AM Glorya Janneths Add [J35 13] Encounter for care related to feeding tube       ED Disposition     ED Disposition Condition Date/Time Comment    Discharge Stable Fri Sep 6, 2019  8:29 AM Karyn Doherty [facility] discharge to [facility]              Follow-up Information    None         Discharge Medication List as of 9/6/2019  8:29 AM      CONTINUE these medications which have NOT CHANGED    Details   acetaminophen (TYLENOL) 325 mg tablet 2 tablets (650 mg total) by Per G Tube route every 6 (six) hours as needed for mild pain, Starting Fri 8/2/2019, Normal      bisacodyl (BISCOLAX) 10 mg suppository Insert 10 mg into the rectum daily at bedtime as needed for constipation (if no BM day #4), Historical Med      cannabidiol (EPIDIOLEX) 100 mg/ml SOLN 4 mL (400 mg total) by Per G Tube route 2 (two) times a day, Starting Thu 8/22/2019, Print      cloBAZam (ONFI) 10 MG tablet Give half a tab at bedtime, Print      diazepam (VALIUM) 5 MG/ML solution Give 1mL by PEG tube as needed for generalized tonic clonic seizure lasting more than 5 minutes, Print      levETIRAcetam (KEPPRA) 100 mg/mL oral solution 10 mL (1,000 mg total) by Per G Tube route every 8 (eight) hours, Starting Thu 8/22/2019, Print      levOCARNitine (CARNITOR) 1 g/10 mL solution 5 mL (500 mg total) by Per G Tube route every 12 (twelve) hours, Starting Thu 8/22/2019, Print      magnesium hydroxide (MILK OF MAGNESIA) 400 mg/5 mL oral suspension Take 30 mL by mouth daily as needed for constipation , Historical Med      MELATONIN PO 6 mg by Per G Tube route daily at bedtime, Historical Med      Multiple Vitamins-Minerals (MULTIVITAMIN WITH IRON-MINERALS) liquid 5 mL by Per G Tube route daily, Historical Med      Probiotic Product (ALEXANDER-BID PROBIOTIC PO) 1 tablet by Per G Tube route daily  , Historical Med      rufinamide (BANZEL) 400 mg tablet 4 tablets (1,600 mg total) by Per G Tube route every 12 (twelve) hours, Starting Thu 8/22/2019, Print      topiramate (TOPAMAX) 50 MG tablet 5 tablets (250 mg total) by Per G Tube route every 12 (twelve) hours, Starting Thu 8/22/2019, Print           No discharge procedures on file      ED Provider  Electronically Signed by           Marieta Osgood, DO  09/06/19 7958

## 2019-09-06 NOTE — ED NOTES
Outer tube of gastrostomy tube changed by dr Jimenez Haywood  No leaking noted at this time when flushed   Will chico same with contrast dye     Lukas Blackwell RN  09/06/19 7662

## 2019-09-27 ENCOUNTER — TELEPHONE (OUTPATIENT)
Dept: NEUROLOGY | Facility: CLINIC | Age: 39
End: 2019-09-27

## 2019-09-27 DIAGNOSIS — G40.812 LENNOX-GASTAUT SYNDROME WITH TONIC SEIZURES (HCC): ICD-10-CM

## 2019-09-27 NOTE — TELEPHONE ENCOUNTER
Attempted to call [facility]  Was transferred to 3 different people and then was on hold an extended period of time   Will need to attempt again later

## 2019-09-27 NOTE — TELEPHONE ENCOUNTER
Toña Kyle from [facility] is calling to get your input on how to handle patients seizure protocol  Toña Kyle stated patient has been having small seizures since 7am and she has followed the current seizure protocol with the seizure magnet and the next step would be if the seizure lasts more than 3 minutes to send to ER however she said the seizures dont last that long but they are continuing to happen  She would like a returned call to discuss what you feel she needs to do for the patient  Toña Kyle [phone] extension #[phone] or you can ask for Toña Kyle in the [place]

## 2019-09-27 NOTE — TELEPHONE ENCOUNTER
Are these the tonic seizures with subtle arm jerking for a couple of seconds? What is the frequency of these seizures ? Can she be aroused kept awake for at least 15 minutes and see if there are still seizures when she is fully awake? Typically, her seizures happen when she is asleep  When she is asleep she typically has 5-10 tonic seizures, which has been constant for the last few years  Is she having seizures when awake?

## 2019-09-30 NOTE — TELEPHONE ENCOUNTER
It seems like the increase in activity on Friday was probably related to variability in seizure frequency and what is witnessed  I am cautious about giving rescue type of medication (such as diazepam or lorazepam) because of the frequency of her seizures and how variable the response is to these medications when she was on continuous EEG monitoring  Along with the risk of respiratory depression  If there is more visible tonic seizure activity that is continuous for 30 minutes, we can try a single dose of diazepam oral solution by PEG tube, limit to one dose in 24 hour period  Monitor for response in seizure activity over the next 1 hour and report if there is excessive sedation or respiratory depression  Do they want me to send an eprescribed script to her pharmacy?

## 2019-09-30 NOTE — TELEPHONE ENCOUNTER
Called and spoke with Arabella Rocha at [facility]  She is caring for Ayleen Bey but is not very familiar with her  She reports the events involved the patient zoning out and then would have either whole body shaking or just one extremity would shake She is unsure of the freqency  Reports these events were while she was awake and there is nothing noted as occurring while sleeping  She has not had anymore of these events today

## 2019-10-01 NOTE — TELEPHONE ENCOUNTER
Message left at [facility] for a return call to office regarding Dr España's recommendations

## 2019-10-02 RX ORDER — DIAZEPAM 5 MG/ML
SOLUTION, CONCENTRATE ORAL
Qty: 30 ML | Refills: 0 | Status: SHIPPED | OUTPATIENT
Start: 2019-10-02 | End: 2019-11-26 | Stop reason: SDUPTHER

## 2019-10-02 NOTE — TELEPHONE ENCOUNTER
Received a return call from [place], and was then transferred to the nurse Christiano Lincoln  I made her aware of the below, verbalized understanding, no further questions  She asked that the updated Diazepam order be faxed to the pharmacy, and that this encounter be faxed to her  I faxed encounter to [phone]

## 2019-10-02 NOTE — TELEPHONE ENCOUNTER
Updated diazepam 5mg/mL solution script sent to [address] [place] [address] Rd    Instructions for diazepam 5mg/mL solution  Give 1mL by PEG tube PRN generalized tonic clonic seizure longer than 5 minutes or continuous multiple tonic seizures over 30 minutes

## 2019-10-03 ENCOUNTER — TELEPHONE (OUTPATIENT)
Dept: NEUROLOGY | Facility: CLINIC | Age: 39
End: 2019-10-03

## 2019-10-03 NOTE — TELEPHONE ENCOUNTER
genny from [facility] called and states that the last time that the pt was sent to the ER her vns magnet was not returned  i spoke to annita and they have magnets in [facility] office  genny made aware  she states that she could come to the office to pick them up  she will pick them up today

## 2019-10-03 NOTE — TELEPHONE ENCOUNTER
Jazlyn from [place] [address] stopped by the office to  a box of magnets for the patient  1 box given to her   LOT #[id number]

## 2019-10-25 ENCOUNTER — TELEPHONE (OUTPATIENT)
Dept: NEUROLOGY | Facility: CLINIC | Age: 39
End: 2019-10-25

## 2019-10-25 NOTE — TELEPHONE ENCOUNTER
Nadia Brandon from [address] [facility] called  Patient did not get Onfi 10mg 0 5 tablet 10/24/19 PM dose due to unavailability  Her medications did not come from pharmacy  Facility is monitoring patient at this time and there is no current seizure activity  They now have medication and patient will resume dose tonight  They wanted you to be aware  Please advise if they need to change any medications/dosages, etc     [phone] callback number to facility

## 2019-10-30 ENCOUNTER — TELEPHONE (OUTPATIENT)
Dept: NEUROLOGY | Facility: CLINIC | Age: 39
End: 2019-10-30

## 2019-10-30 NOTE — TELEPHONE ENCOUNTER
Intractable epilepsy, she will continue to have occasionally witnessed seizures  Please keep a calendar of her seizures (seizure log)  Bring the calendar to all office visits, will need it to monitor the frequency of seizures  Ideally, there is a reduction in seizure frequency (not seizure freedom) with medications  We took her off of Depakote, so Levocarnitine is not needed at this point  Please discontinue Levocarnitine (only needed if she is on Depakote)

## 2019-10-30 NOTE — TELEPHONE ENCOUNTER
Jazlyn from [place] [address] reports the patient had 2 seizures last night and the magnet worked for both of these  Spoke with nurse Richard Sandoval  She reports that there was more noise last night as trick or treaters were on the floor  Patient became very agitated, then face became bright red and patient had a blank stare and slight tremor in her right hand  This happened 2x, lasting 3 minutes total     Richard Sandoval denies sleep deprivation, missed/new medications, illness, or new stress for the patient  Confirmed medications as below, patient is receiving Jorge Guajardo again (see 10/25 encounter)       -Levetiracetam 100mg/mL 10mL every 8 hours  -Topiramate 250mg twice a day  -Banzel 400mg give 4 tabs twice a day  -Epidiolex 100mg/mL 4 mL (400mg) twice a day   -Onfi 5mg at bedtime  -Levocarnitine 1000mg/10mL 500mg every 12 hours    [phone] ext 2

## 2019-10-31 NOTE — TELEPHONE ENCOUNTER
[employer] [facility], spoke w/ Nicky Hurtado  I asked to speak w/a nurse  She then transferred me to another dept  Continuous ringing  Called again, spoke w/Jessica and advised of the above  She placed me hold  I was transferred to nurse Joanne Wells and advised of all of the below  She did confirm, pt is off depakote  She will discontinue levocarnitine as ordered

## 2019-10-31 NOTE — TELEPHONE ENCOUNTER
Attempted to reach patient's nurse, was on hold for an extended period, will need to reach out again later

## 2019-11-19 ENCOUNTER — TELEPHONE (OUTPATIENT)
Dept: NEUROLOGY | Facility: CLINIC | Age: 39
End: 2019-11-19

## 2019-11-19 NOTE — TELEPHONE ENCOUNTER
Please have them bring her seizure log to her upcoming appointment next week  Will consider starting Briviact

## 2019-11-19 NOTE — TELEPHONE ENCOUNTER
Spoke with Zeynep Armendariz  She reports that the patient sleeps a lot during the day and then becomes more awake in the evening however they are unable to say if this is due to the onfi

## 2019-11-19 NOTE — TELEPHONE ENCOUNTER
How has the patient been tolerating the Onfi at bedtime  Has it been causing her excessive sedation during the daytime?

## 2019-11-19 NOTE — TELEPHONE ENCOUNTER
Prem from [place] [address] called to report pt's seizures last night as well as 11/14/19:     Starting around 3am- pt had 6 seizures lasting 30 seconds each back to back  Did not use VNS  Back to baseline currently  Maurilio Arreola unable to describe seizures as they were observed by previous shift and a description was not documented  -Did not give ativan last night    Confirmed med list as:    -Levetiracetam 100mg/mL give 10mL every 8 hours  -Topiramate 250mg twice a day  -Banzel 400mg give 4 tabs twice a day  -Epidiolex 100mg/mL 4 mL (400mg) twice a day   -Onfi to 5mg at bedtime  -diazepam 5mg/mL oral solution, give 1mL PRN for sz over 5 mins    No recently missed medications    She states that November 4th starting at 1013pm to 1042 pm pt has a cluster of seizures lasting 15-45 seconds  A dose of  Diazepam was given and VNS magnet was used  No recent med levels drawn        [phone] Last peters

## 2019-11-20 ENCOUNTER — TELEPHONE (OUTPATIENT)
Dept: NEUROLOGY | Facility: CLINIC | Age: 39
End: 2019-11-20

## 2019-11-20 NOTE — TELEPHONE ENCOUNTER
Took call from [facility] regarding patient's Satira Matters  They state patient's pharmacy needs new script  Advised that pharmacy sent script on 8/19 with 5 refills  Pharmacy called and advised by them that there are no issues and that prescription was filled and ready today  Called [place] back and advised

## 2019-11-20 NOTE — TELEPHONE ENCOUNTER
Called and spoke to Esperanza, the nurse caring for her today, and advised the above  Nurse understood and will have the seizure log brought with patient for her next visit

## 2019-11-21 ENCOUNTER — HOSPITAL ENCOUNTER (EMERGENCY)
Facility: HOSPITAL | Age: 39
Discharge: HOME | End: 2019-11-21
Attending: EMERGENCY MEDICINE | Admitting: EMERGENCY MEDICINE
Payer: MEDICARE

## 2019-11-21 ENCOUNTER — APPOINTMENT (EMERGENCY)
Dept: RADIOLOGY | Facility: HOSPITAL | Age: 39
End: 2019-11-21
Attending: EMERGENCY MEDICINE
Payer: MEDICARE

## 2019-11-21 VITALS
DIASTOLIC BLOOD PRESSURE: 64 MMHG | BODY MASS INDEX: 19.3 KG/M2 | SYSTOLIC BLOOD PRESSURE: 95 MMHG | WEIGHT: 98.33 LBS | RESPIRATION RATE: 16 BRPM | TEMPERATURE: 98 F | HEIGHT: 60 IN | OXYGEN SATURATION: 96 % | HEART RATE: 85 BPM

## 2019-11-21 DIAGNOSIS — T85.598A FEEDING TUBE DYSFUNCTION, INITIAL ENCOUNTER: Primary | ICD-10-CM

## 2019-11-21 PROCEDURE — 96361 HYDRATE IV INFUSION ADD-ON: CPT

## 2019-11-21 PROCEDURE — C1894 INTRO/SHEATH, NON-LASER: HCPCS

## 2019-11-21 PROCEDURE — 99284 EMERGENCY DEPT VISIT MOD MDM: CPT

## 2019-11-21 PROCEDURE — 99285 EMERGENCY DEPT VISIT HI MDM: CPT | Performed by: EMERGENCY MEDICINE

## 2019-11-21 PROCEDURE — C1769 GUIDE WIRE: HCPCS

## 2019-11-21 PROCEDURE — NC001 PR NO CHARGE: Performed by: EMERGENCY MEDICINE

## 2019-11-21 PROCEDURE — 49450 REPLACE G/C TUBE PERC: CPT | Performed by: RADIOLOGY

## 2019-11-21 PROCEDURE — 49450 REPLACE G/C TUBE PERC: CPT

## 2019-11-21 PROCEDURE — 99024 POSTOP FOLLOW-UP VISIT: CPT | Performed by: RADIOLOGY

## 2019-11-21 PROCEDURE — 96360 HYDRATION IV INFUSION INIT: CPT

## 2019-11-21 RX ORDER — LEVETIRACETAM 100 MG/ML
1000 SOLUTION ORAL ONCE
Status: COMPLETED | OUTPATIENT
Start: 2019-11-21 | End: 2019-11-21

## 2019-11-21 RX ORDER — LIDOCAINE HYDROCHLORIDE 20 MG/ML
SOLUTION OROPHARYNGEAL CODE/TRAUMA/SEDATION MEDICATION
Status: COMPLETED | OUTPATIENT
Start: 2019-11-21 | End: 2019-11-21

## 2019-11-21 RX ORDER — SODIUM CHLORIDE 9 MG/ML
75 INJECTION, SOLUTION INTRAVENOUS CONTINUOUS
Status: DISCONTINUED | OUTPATIENT
Start: 2019-11-21 | End: 2019-11-21 | Stop reason: HOSPADM

## 2019-11-21 RX ORDER — LEVETIRACETAM 250 MG/1
1000 TABLET ORAL ONCE
Status: DISCONTINUED | OUTPATIENT
Start: 2019-11-21 | End: 2019-11-21

## 2019-11-21 RX ORDER — RUFINAMIDE 200 MG/1
1600 TABLET, FILM COATED ORAL DAILY
Status: DISCONTINUED | OUTPATIENT
Start: 2019-11-21 | End: 2019-11-21 | Stop reason: HOSPADM

## 2019-11-21 RX ADMIN — SODIUM CHLORIDE 75 ML/HR: 0.9 INJECTION, SOLUTION INTRAVENOUS at 08:55

## 2019-11-21 RX ADMIN — IOHEXOL 20 ML: 300 INJECTION, SOLUTION INTRAVENOUS at 11:57

## 2019-11-21 RX ADMIN — SODIUM CHLORIDE 1000 ML: 0.9 INJECTION, SOLUTION INTRAVENOUS at 06:23

## 2019-11-21 RX ADMIN — TOPIRAMATE 250 MG: 100 TABLET, FILM COATED ORAL at 13:05

## 2019-11-21 RX ADMIN — LEVETIRACETAM 1000 MG: 100 SOLUTION ORAL at 13:08

## 2019-11-21 RX ADMIN — LIDOCAINE HYDROCHLORIDE 15 ML: 20 SOLUTION ORAL; TOPICAL at 11:44

## 2019-11-21 NOTE — ED PROVIDER NOTES
History  Chief Complaint   Patient presents with    Feeding Tube Problem     feeding tube dislodged  HPI    Prior to Admission Medications   Prescriptions Last Dose Informant Patient Reported? Taking?    MELATONIN PO 2019 at Unknown time Outside Facility ([address] [place] [address]) Yes Yes   Si mg by Per G Tube route daily at bedtime   Multiple Vitamins-Minerals (MULTIVITAMIN WITH IRON-MINERALS) liquid 2019 at Unknown time Outside Facility ([address] [place] [address]) Yes Yes   Si mL by Per G Tube route daily   Probiotic Product (ALEXANDER-BID PROBIOTIC PO) 2019 at Unknown time Outside Facility ([address] [place] [address]) Yes Yes   Si tablet by Per G Tube route daily     acetaminophen (TYLENOL) 325 mg tablet Past Month at Unknown time  No Yes   Si tablets (650 mg total) by Per G Tube route every 6 (six) hours as needed for mild pain   bisacodyl (BISCOLAX) 10 mg suppository Unknown at Unknown time Outside Facility (Specify) Yes No   Sig: Insert 10 mg into the rectum daily at bedtime as needed for constipation (if no BM day #4)   cannabidiol (EPIDIOLEX) 100 mg/ml SOLN 2019 at Unknown time  No Yes   Si mL (400 mg total) by Per G Tube route 2 (two) times a day   cloBAZam (ONFI) 10 MG tablet 2019 at Unknown time  No Yes   Sig: Give half a tab at bedtime   diazepam (VALIUM) 5 MG/ML solution Past Month at Unknown time  No Yes   Sig: Give 1mL by PEG tube PRN generalized tonic clonic seizure longer than 5 minutes or continuous multiple tonic seizures over 30 minutes   levETIRAcetam (KEPPRA) 100 mg/mL oral solution 2019 at Unknown time  No Yes   Sig: 10 mL (1,000 mg total) by Per G Tube route every 8 (eight) hours   levOCARNitine (CARNITOR) 1 g/10 mL solution Not Taking at Unknown time  No No   Si mL (500 mg total) by Per G Tube route every 12 (twelve) hours   Patient not taking: Reported on 2019   magnesium hydroxide (MILK OF MAGNESIA) 400 mg/5 mL oral suspension Unknown at Unknown time Outside Facility ([address] [place] [address]) Yes No   Sig: Take 30 mL by mouth daily as needed for constipation    rufinamide (BANZEL) 400 mg tablet 2019 at Unknown time  No Yes   Si tablets (1,600 mg total) by Per G Tube route every 12 (twelve) hours   topiramate (TOPAMAX) 50 MG tablet 2019 at Unknown time  No Yes   Si tablets (250 mg total) by Per G Tube route every 12 (twelve) hours      Facility-Administered Medications: None       Past Medical History:   Diagnosis Date    ADHD     Anoxic brain damage (HCC)     Autistic disorder     Breakthrough seizure ([facility] [address] ) 2019    Hyperammonemia (HCC)     Hyperkeratosis     Hypotension     Intellectual disability     Lennox-Gastaut syndrome with tonic seizures (HCC)     Lethargy     Liver enzyme elevation     Onychomycosis     Osteoporosis     Osteoporosis        Past Surgical History:   Procedure Laterality Date    ABDOMINAL SURGERY      CARDIAC PACEMAKER PLACEMENT      vns implant l chest    IR THORACENTESIS  2018    JEJUNOSTOMY FEEDING TUBE      PEG TUBE PLACEMENT         History reviewed  No pertinent family history  I have reviewed and agree with the history as documented      Social History     Tobacco Use    Smoking status: Never Smoker    Smokeless tobacco: Never Used   Substance Use Topics    Alcohol use: Not Currently    Drug use: No        Review of Systems    Physical Exam  Physical Exam    Vital Signs  ED Triage Vitals [19 0404]   Temperature Pulse Respirations Blood Pressure SpO2   97 6 °F (36 4 °C) 73 16 (!) 83/50 93 %      Temp Source Heart Rate Source Patient Position - Orthostatic VS BP Location FiO2 (%)   Temporal Monitor Lying Right arm --      Pain Score       --           Vitals:    19 0552 19 1053 19 1128 19 1210   BP: (!) 84/53 (!) 88/53 (!) 87/55 109/68   Pulse: 73 86 81 84   Patient Position - Orthostatic VS: Lying Lying  Lying         Visual Acuity      ED Medications  Medications   sodium chloride 0 9 % infusion (75 mL/hr Intravenous New Bag 11/21/19 0855)   topiramate (TOPAMAX) tablet 250 mg (has no administration in time range)   rufinamide (BANZEL) tablet 1,600 mg (has no administration in time range)   levETIRAcetam (KEPPRA) oral solution 1,000 mg (has no administration in time range)   sodium chloride 0 9 % bolus 1,000 mL (0 mL Intravenous Stopped 11/21/19 0855)   Lidocaine Viscous HCl (XYLOCAINE) 2 % mucosal solution (15 mL  Given 11/21/19 1144)   iohexol (OMNIPAQUE) 300 mg/mL injection 50 mL (20 mL Intravenous Given 11/21/19 1157)       Diagnostic Studies  Results Reviewed     None                 IR PEG/GJ tube placement    (Results Pending)              Procedures  Procedures       ED Course                               MDM  Number of Diagnoses or Management Options  Diagnosis management comments: Patient received in sign-out  I attempted to contact interventional radiology to discuss replacement the tube  Patient is in her usual state of health  Patient is nonverbal     12  Dr Daphne Stevens will replace tube at 11:30       Disposition  Final diagnoses:   Feeding tube dysfunction, initial encounter     Time reflects when diagnosis was documented in both MDM as applicable and the Disposition within this note     Time User Action Codes Description Comment    11/21/2019  7:43 AM Rola Onofre Add [T66 935O] Feeding tube dysfunction, initial encounter       ED Disposition     ED Disposition Condition Date/Time Comment    Discharge Stable u Nov 21, 2019  7:44 AM [facility] COTT[facility] discharge to home/self care  Follow-up Information     Follow up With Specialties Details Why Contact Info    Yumiko Oquendo MD Internal Medicine   [address] [place] [address]  [phone]            Patient's Medications   Discharge Prescriptions    No medications on file     No discharge procedures on file      ED Provider  Electronically Signed by           Jaja Arellano DO  11/21/19 1304

## 2019-11-21 NOTE — ED NOTES
Spoke with Abundio Reinoso from [facility]  Stated they will be here in about 45 minutes to see patient            Woodrow Acevedo, RN  11/21/19 5035

## 2019-11-21 NOTE — PROCEDURES
Gastrostomy tube replaced without incident  Apparently, the nursing home left the feeding tube attached to the low profile device  This allowed the patient to pull it out  We will contact them and educate  Gastrostomy tube is available for immediate use

## 2019-11-21 NOTE — ED NOTES
Dr Kelly Parker spoke to [facility] akilah zhu ir and pt will be going for feeding tube replacement approx [id number] today     Jac Adames RN  11/21/19 3086

## 2019-11-21 NOTE — ED PROVIDER NOTES
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 Taking?    MELATONIN PO 2019 at Unknown time Outside Facility ([address] [place] [address]) Yes Yes   Si mg by Per G Tube route daily at bedtime   Multiple Vitamins-Minerals (MULTIVITAMIN WITH IRON-MINERALS) liquid 2019 at Unknown time Outside Facility ([address] [place] [address]) Yes Yes   Si mL by Per G Tube route daily   Probiotic Product (ALEXANDER-BID PROBIOTIC PO) 2019 at Unknown time Outside Facility ([address] [place] [address]) Yes Yes   Si tablet by Per G Tube route daily     acetaminophen (TYLENOL) 325 mg tablet Past Month at Unknown time  No Yes   Si tablets (650 mg total) by Per G Tube route every 6 (six) hours as needed for mild pain   bisacodyl (BISCOLAX) 10 mg suppository Unknown at Unknown time Outside Facility (Specify) Yes No   Sig: Insert 10 mg into the rectum daily at bedtime as needed for constipation (if no BM day #4)   cannabidiol (EPIDIOLEX) 100 mg/ml SOLN 2019 at Unknown time  No Yes   Si mL (400 mg total) by Per G Tube route 2 (two) times a day   cloBAZam (ONFI) 10 MG tablet 2019 at Unknown time  No Yes   Sig: Give half a tab at bedtime   diazepam (VALIUM) 5 MG/ML solution Past Month at Unknown time  No Yes   Sig: Give 1mL by PEG tube PRN generalized tonic clonic seizure longer than 5 minutes or continuous multiple tonic seizures over 30 minutes   levETIRAcetam (KEPPRA) 100 mg/mL oral solution 2019 at Unknown time  No Yes   Sig: 10 mL (1,000 mg total) by Per G Tube route every 8 (eight) hours   levOCARNitine (CARNITOR) 1 g/10 mL solution Not Taking at Unknown time  No No   Si mL (500 mg total) by Per G Tube route every 12 (twelve) hours   Patient not taking: Reported on 2019   magnesium hydroxide (MILK OF MAGNESIA) 400 mg/5 mL oral suspension Unknown at Unknown time Outside Facility (Specify) Yes No   Sig: Take 30 mL by mouth daily as needed for constipation    rufinamide (BANZEL) 400 mg tablet 2019 at Unknown time  No Yes   Si tablets (1,600 mg total) by Per G Tube route every 12 (twelve) hours   topiramate (TOPAMAX) 50 MG tablet 2019 at Unknown time  No Yes   Si tablets (250 mg total) by Per G Tube route every 12 (twelve) hours      Facility-Administered Medications: None       Past Medical History:   Diagnosis Date    ADHD     Anoxic brain damage (HCC)     Autistic disorder     Breakthrough seizure ([facility] [address] ) 2019    Hyperammonemia (HCC)     Hyperkeratosis     Hypotension     Intellectual disability     Lennox-Gastaut syndrome with tonic seizures (HCC)     Lethargy     Liver enzyme elevation     Onychomycosis     Osteoporosis     Osteoporosis        Past Surgical History:   Procedure Laterality Date    ABDOMINAL SURGERY      CARDIAC PACEMAKER PLACEMENT      vns implant l chest    IR PEG/GJ TUBE PLACEMENT  2019    IR THORACENTESIS  2018    JEJUNOSTOMY FEEDING TUBE      PEG TUBE PLACEMENT         History reviewed  No pertinent family history  I have reviewed and agree with the history as documented      Social History     Tobacco Use    Smoking status: Never Smoker    Smokeless tobacco: Never Used   Substance Use Topics    Alcohol use: Not Currently    Drug use: No        Review of Systems   Unable to perform ROS: Patient nonverbal (Severe underlying cognitive delay) Physical Exam  Physical Exam   Constitutional: She appears well-developed and well-nourished  No distress  HENT:   Head: Normocephalic and atraumatic  Right Ear: External ear normal    Left Ear: External ear normal    Nose: Nose normal    Mouth/Throat: Oropharynx is clear and moist  No oropharyngeal exudate  Eyes: Pupils are equal, round, and reactive to light  Conjunctivae and EOM are normal  Right eye exhibits no discharge  Left eye exhibits no discharge  No scleral icterus  Neck: Normal range of motion  Neck supple  No JVD present  No tracheal deviation present  No thyromegaly present  Cardiovascular: Normal rate, regular rhythm and normal heart sounds  No murmur heard  Pulmonary/Chest: Effort normal and breath sounds normal  No stridor  No respiratory distress  She has no wheezes  She has no rales  She exhibits no tenderness  Abdominal: Soft  Bowel sounds are normal  She exhibits no distension and no mass  There is no tenderness  There is no rebound and no guarding  Gastric tube site is now closed with prior tract no longer present  Unable to pass a temporary nguyen catheter  Musculoskeletal: Normal range of motion  She exhibits no edema, tenderness or deformity  Lymphadenopathy:     She has no cervical adenopathy  Neurological: She is alert  She has normal reflexes  She displays normal reflexes  No cranial nerve deficit  She exhibits normal muscle tone  Coordination normal    Alert  Non-verbal at baseline  Pt does not follow commands and is unable to provide any history  Skin: Skin is warm and dry  No rash noted  She is not diaphoretic  No erythema  No pallor  Psychiatric: She has a normal mood and affect  Her behavior is normal  Judgment and thought content normal    Nursing note and vitals reviewed        Vital Signs  ED Triage Vitals [11/21/19 0404]   Temperature Pulse Respirations Blood Pressure SpO2   97 6 °F (36 4 °C) 73 16 (!) 83/50 93 %      Temp Source Heart Rate Source Patient Position - Orthostatic VS BP Location FiO2 (%)   Temporal Monitor Lying Right arm --      Pain Score       --           Vitals:    11/21/19 1053 11/21/19 1128 11/21/19 1210 11/21/19 1315   BP: (!) 88/53 (!) 87/55 109/68 95/64   Pulse: 86 81 84 85   Patient Position - Orthostatic VS: Lying  Lying Lying         Visual Acuity      ED Medications  Medications   sodium chloride 0 9 % bolus 1,000 mL (0 mL Intravenous Stopped 11/21/19 0855)   Lidocaine Viscous HCl (XYLOCAINE) 2 % mucosal solution (15 mL  Given 11/21/19 1144)   iohexol (OMNIPAQUE) 300 mg/mL injection 50 mL (20 mL Intravenous Given 11/21/19 1157)   levETIRAcetam (KEPPRA) oral solution 1,000 mg (1,000 mg Oral Given 11/21/19 1308)       Diagnostic Studies  Results Reviewed     None                 IR PEG/GJ tube placement   Final Result by Moncho Banks MD (11/21 1617)   Technically successful sinogram and gastrostomy placement using fluoroscopic guidance  This is the end of the clinically relevant portion of this report  Subsequent information is for compliance, documentation, and coding purposes  In the process of informed consent, information was communicated, verbally, to the patient regarding the procedure  The patient was informed of; the name of the procedure, nature of the procedure, nature of the condition, risks, benefits, alternatives,    and potential complications, as well as the consequences of not having the procedure  All the patient's questions were answered  Informed consent was signed  Automated exposure control was utilized, and dose was minimized, in accordance with the application of the ALARA technique  Chlorhexidine and alcohol was used for cleansing and sterile preparation of the skin  This was allowed to dry prior to the application of sterile draping  Timeout was performed, with all team members present, and in agreement    Confirmation of patient, procedure, laterally, allergies, and all needed equipment was performed  Fluoroscopy time: 2 2 Minutes   Radiation dose: 55 milliGray   CONTRAST DOSE:20 cc Omnipaque 300   PROCEDURE: See report title   Preprocedure diagnosis: Please see "history ", above   Postprocedure diagnosis: Same   Antibiotics: None   Specimen: No   Estimated blood loss: Less than 5 mL   Anesthesia: Local         Workstation performed: [id number]                    Procedures  Procedures       ED Course           7:05 AM - Awaiting IR to evaluate the patient  Pt will be signed out to the oncoming ED physician  MDM  Number of Diagnoses or Management Options  Feeding tube dysfunction, initial encounter:   Diagnosis management comments: IMP: g-tube self discontinued by the patient with closed tract  Doubt internal injury, sepsis  Plan: Give ivf prn  Will consult IR for tube re-insertion  Amount and/or Complexity of Data Reviewed  Decide to obtain previous medical records or to obtain history from someone other than the patient: yes  Obtain history from someone other than the patient: yes (EMS providers)  Review and summarize past medical records: yes  Independent visualization of images, tracings, or specimens: yes    Risk of Complications, Morbidity, and/or Mortality  Presenting problems: high  Diagnostic procedures: minimal  Management options: moderate    Patient Progress  Patient progress: improved      Disposition  Final diagnoses:   Feeding tube dysfunction, initial encounter     Time reflects when diagnosis was documented in both MDM as applicable and the Disposition within this note     Time User Action Codes Description Comment    11/21/2019  7:43 AM John Judge Add [[id number]] Feeding tube dysfunction, initial encounter       ED Disposition     ED Disposition Condition Date/Time Comment    Discharge St[facility] u Nov 21, 2019  [facility]              Follow-up Information Follow up With Specialties Details Why Contact Info    Jenn Vinson MD Internal Medicine   [address]  [place] [address] [phone]            Discharge Medication List as of 11/21/2019 12:00 PM      CONTINUE these medications which have NOT CHANGED    Details   acetaminophen (TYLENOL) 325 mg tablet 2 tablets (650 mg total) by Per G Tube route every 6 (six) hours as needed for mild pain, Starting Fri 8/2/2019, Normal      bisacodyl (BISCOLAX) 10 mg suppository Insert 10 mg into the rectum daily at bedtime as needed for constipation (if no BM day #4), Historical Med      cannabidiol (EPIDIOLEX) 100 mg/ml SOLN 4 mL (400 mg total) by Per G Tube route 2 (two) times a day, Starting Thu 8/22/2019, Print      cloBAZam (ONFI) 10 MG tablet Give half a tab at bedtime, Print      diazepam (VALIUM) 5 MG/ML solution Give 1mL by PEG tube PRN generalized tonic clonic seizure longer than 5 minutes or continuous multiple tonic seizures over 30 minutes, Normal      levETIRAcetam (KEPPRA) 100 mg/mL oral solution 10 mL (1,000 mg total) by Per G Tube route every 8 (eight) hours, Starting Thu 8/22/2019, Print      levOCARNitine (CARNITOR) 1 g/10 mL solution 5 mL (500 mg total) by Per G Tube route every 12 (twelve) hours, Starting Thu 8/22/2019, Print      magnesium hydroxide (MILK OF MAGNESIA) 400 mg/5 mL oral suspension Take 30 mL by mouth daily as needed for constipation , Historical Med      MELATONIN PO 6 mg by Per G Tube route daily at bedtime, Historical Med      Multiple Vitamins-Minerals (MULTIVITAMIN WITH IRON-MINERALS) liquid 5 mL by Per G Tube route daily, Historical Med      Probiotic Product (ALEXANDER-BID PROBIOTIC PO) 1 tablet by Per G Tube route daily  , Historical Med      rufinamide (BANZEL) 400 mg tablet 4 tablets (1,600 mg total) by Per G Tube route every 12 (twelve) hours, Starting Thu 8/22/2019, Print      topiramate (TOPAMAX) 50 MG tablet 5 tablets (250 mg total) by Per G Tube route every 12 (twelve) hours, Starting Thu 8/22/2019, Print           No discharge procedures on file      ED Provider  Electronically Signed by           Sherwin Esteves DO  11/25/19 7256

## 2019-11-21 NOTE — DISCHARGE INSTRUCTIONS
[address]  Interventional Radiology    [address]: [phone] (M-F 7:30am - 4:00pm)  [facility]: [phone] (Off hours or no answer)      [address] USE DUE TO PATIENT PULLING  How to Use and Care for Your PEG Tube, Ambulatory Care   GENERAL INFORMATION:   A percutaneous endoscopic gastrostomy (PEG) tube  is a plastic tube that is put into your stomach through your skin  PEG tubes are most often used to give you food or liquids if you are not able to eat or drink  Medical formula, medicines, and water can be given through a PEG tube  Seek immediate care for the following symptoms:   · Coughing or vomiting during or after a feeding    · Severe abdominal pain    · Blood or tube feeding fluid leaks from the PEG tube site    · The PEG tube is shorter than it was when it was put in    · The PEG tube comes out    · Dry mouth, fast heartbeat, or weakness  How to use your PEG tube: Your healthcare provider will tell you when and how often to use your PEG tube for feedings  You may need a bolus, intermittent, or continuous feeding  A bolus feeding is when formula is give over a short period of time  An intermittent feeding is scheduled for certain times throughout the day  Continuous feedings run all the time  Ask your healthcare provider which method is best for you:  · Use a feeding syringe  Connect the feeding syringe to the end of the PEG tube  Pour the correct amount of formula into the syringe  Hold the syringe up high  Formula will flow into the PEG tube  The syringe plunger may be used to gently push the last of the formula through the PEG tube  · Use a gravity drip bag  Connect the tubing from the gravity drip bag to the end of the PEG tube  Pour the formula into a gravity drip bag  Hang the bag on a medical pole, a , or other device   Adjust the flow rate on the tubing according to your healthcare provider's instructions  Formula will flow into the PEG tube  Ask how long it should take to complete your feeding  · Use a feeding pump  You may use an electric pump to control the flow of the formula into your PEG tube  Your healthcare provider will teach you how to set up and use the pump  How to care for your PEG tube:   · Always flush your PEG tube before and after each use  This helps prevent blockage from formula or medicine  Use at least 2 tablespoons (30 milliliters) of water to flush the tube  Follow directions for flushing your PEG tube  · If your PEG tube becomes clogged, try to unclog it as soon as you can  Flush your PEG tube with a 60 milliliter (mL) syringe filled with warm water  Never use a wire to unclog the tube  A wire can poke a hole in the tube  Your healthcare provider may have you use a special medicine or a plastic brush to help unclog your tube  · Check the PEG tube daily  ¨ Check the length of the tube from the end to where it goes into your body  If it gets longer, it may be at risk for coming out  If it gets shorter, let your healthcare provider know right away  ¨ Check the bumper  (piece that goes around the tube, next to your skin)  It should be snug against your skin  Tell your healthcare provider if the bumper seems too tight or too loose  · Use an alcohol pad to clean the end of your PEG tube  Do this before you connect tubing or a syringe to your PEG tube and after you remove it  When you disconnect tubing or a syringe from your PEG tube, do not let the end of the PEG tube touch anything  How do I care for the skin around my PEG tube? · Do not remove the stitches or medical tape that hold your PEG tube in place  when you first get it  Your healthcare provider will take them off once the skin around your tube heals  Leave clean bandages over the tube area for the first 24 hours after the tube is put in   You may not need to use bandages after 24 hours if the skin around the tube looks dry  Ask when you can shower or bathe  · Routine skin care:      ¨ Clean the skin around your tube 1 to 2 times each day  Ask your healthcare provider what you should use to clean your skin  Check for redness and swelling in the area where the tube goes into your body  Check for fluid draining from your stoma (the hole where the tube was put in)  ¨ Gently turn your tube daily  after your stitches come out  This may decrease pressure on your skin under the bumper  It may also help prevent an infection  ¨ Keep the skin around your PEG tube dry  This will help prevent skin irritation and infection  · Use topical medicines as directed  You may need to put antibiotic cream on the skin around your tube after you are done cleaning it  Other tips to know about a PEG tube:   · You may need to keep track of how much formula and other liquids you have each day  You may also need to keep track of how much you urinate and how many times you have a bowel movement each day  Bring this log to your follow-up visits  · You may need to check your weight daily or weekly  Keep a record of your weights and bring it to your follow-up visits  Your healthcare provider may need to change your feedings if your weight changes too quickly  · Take your medicines as directed  Learn which of your medicines can be crushed, mixed with water, and given through the PEG tube  Certain medicines should not be crushed or may clog the PEG tube  · Go to all follow-up appointments  You may need to have blood tests and other tests when you see your healthcare provider  CARE AGREEMENT:   You have the right to help plan your care  Learn about your health condition and how it may be treated  Discuss treatment options with your caregivers to decide what care you want to receive  You always have the right to refuse treatment  The above information is an  only   It is not intended as medical advice for individual conditions or treatments  Talk to your doctor, nurse or pharmacist before following any medical regimen to see if it is safe and effective for you  © 2014 [address] [employer] is for End User's use only and may not be sold, redistributed or otherwise used for commercial purposes  All illustrations and images included in CareNotes® are the copyrighted property of A D A M , Inc  or Jason Chiu

## 2019-11-21 NOTE — ED NOTES
Spoke to Vidhi Bartlett in [place] [facility] at [facility]  Will speak with dr Jennifer Juarez regarding feeding tube replacement when he arrives       Josie Small RN  11/21/19 8446

## 2019-11-21 NOTE — ED NOTES
Returned from [employer]   Elaine Destiny Awake on stretcher  Non verbal per baseline  Not agitated    [address], RN  11/21/19 1215

## 2019-11-21 NOTE — PROGRESS NOTES
The history and physical were reviewed, along with progress notes, and the patient was examined  There are no changes since it was written  BP (!) 87/55 Comment: Dr Kasi Barba aware   Pulse 81   Temp 98 °F (36 7 °C) (Temporal)   Resp 16   Ht 5' (1 524 m)   Wt 44 6 kg (98 lb 5 2 oz)   LMP  (LMP Unknown)   SpO2 97%   BMI 19 20 kg/m²     Patients G tube was pulled out  [place] left the adapter connected  We will educate them

## 2019-11-22 ENCOUNTER — TELEPHONE (OUTPATIENT)
Dept: NEUROLOGY | Facility: CLINIC | Age: 39
End: 2019-11-22

## 2019-11-26 ENCOUNTER — OFFICE VISIT (OUTPATIENT)
Dept: NEUROLOGY | Facility: CLINIC | Age: 39
End: 2019-11-26
Payer: MEDICARE

## 2019-11-26 VITALS — DIASTOLIC BLOOD PRESSURE: 61 MMHG | HEART RATE: 92 BPM | SYSTOLIC BLOOD PRESSURE: 100 MMHG

## 2019-11-26 DIAGNOSIS — G40.812 LENNOX-GASTAUT SYNDROME WITH TONIC SEIZURES (HCC): Primary | ICD-10-CM

## 2019-11-26 DIAGNOSIS — G40.919 INTRACTABLE EPILEPSY WITHOUT STATUS EPILEPTICUS, UNSPECIFIED EPILEPSY TYPE (HCC): ICD-10-CM

## 2019-11-26 PROCEDURE — 99215 OFFICE O/P EST HI 40 MIN: CPT | Performed by: PSYCHIATRY & NEUROLOGY

## 2019-11-26 PROCEDURE — 95970 ALYS NPGT W/O PRGRMG: CPT | Performed by: PSYCHIATRY & NEUROLOGY

## 2019-11-26 RX ORDER — TOPIRAMATE 50 MG/1
250 TABLET, FILM COATED ORAL EVERY 12 HOURS SCHEDULED
Qty: 300 TABLET | Refills: 5 | Status: SHIPPED | OUTPATIENT
Start: 2019-11-26 | End: 2020-03-10 | Stop reason: SDUPTHER

## 2019-11-26 RX ORDER — DIAZEPAM 5 MG/ML
SOLUTION, CONCENTRATE ORAL
Qty: 30 ML | Refills: 0 | Status: SHIPPED | OUTPATIENT
Start: 2019-11-26 | End: 2020-10-13 | Stop reason: SDUPTHER

## 2019-11-26 RX ORDER — LEVETIRACETAM 100 MG/ML
1000 SOLUTION ORAL EVERY 12 HOURS
Qty: 600 ML | Refills: 5 | Status: SHIPPED | OUTPATIENT
Start: 2019-11-26 | End: 2020-03-10

## 2019-11-26 RX ORDER — CLOBAZAM 10 MG/1
TABLET ORAL
Qty: 15 TABLET | Refills: 5 | Status: SHIPPED | OUTPATIENT
Start: 2019-11-26 | End: 2020-03-10 | Stop reason: SDUPTHER

## 2019-11-26 RX ORDER — RUFINAMIDE 400 MG/1
1600 TABLET, FILM COATED ORAL EVERY 12 HOURS
Qty: 240 TABLET | Refills: 5 | Status: SHIPPED | OUTPATIENT
Start: 2019-11-26 | End: 2020-03-10 | Stop reason: SDUPTHER

## 2019-11-26 NOTE — PROGRESS NOTES
Review of Systems    {LimROS-complete:10927}      Review of Systems   Constitutional: Negative  Negative for appetite change and fever  HENT: Negative  Negative for hearing loss, tinnitus, trouble swallowing and voice change  Eyes: Negative  Negative for photophobia and pain  Respiratory: Negative  Negative for shortness of breath  Cardiovascular: Negative  Negative for palpitations  Gastrointestinal: Negative  Negative for nausea and vomiting  Endocrine: Negative  Negative for cold intolerance and heat intolerance  Genitourinary: Negative  Negative for dysuria, frequency and urgency  Musculoskeletal: Negative  Negative for myalgias and neck pain  Skin: Negative  Negative for rash  Neurological: Negative  Negative for dizziness, tremors, seizures, syncope, facial asymmetry, speech difficulty, weakness, light-headedness, numbness and headaches  Hematological: Negative  Does not bruise/bleed easily  Psychiatric/Behavioral: Negative  Negative for confusion, hallucinations and sleep disturbance

## 2019-11-26 NOTE — PATIENT INSTRUCTIONS
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

## 2019-11-26 NOTE — PROGRESS NOTES
[facility]  Patient's Name: Navdeep Torres   Patient's : 1980   Visit Type: follow-up  Referring MD / PCP:  Kristin Shepard MD     Assessment:  Ms Jessenia Fletcher is a 46 y o  woman with Jie Pay Syndrome, remote history of anoxic brain injury (however her MRI brain study is generally a normal MRI brain), multiple admissions for status epilepticus (however, her EEG studies consistently shows extremely frequent tonic seizures during periods of sleep), severe intellectual disability, and frequent medication adverse effects  Her EEG has features of both generalized and focal epilepsy; her focal seizures seem to occur from the bilateral temporal regions with rhythmic discharges, but no apparent clinical symptoms  She has an epileptic encephalopathy, which means that not only is she at risk for further cognitive decline from frequent seizures but from the underlying epilepsy  She likely has more frequent seizures than what is being reported, because her seizures during sleep are subtle, however, there is no clear recommendation for aggressive management  We will adjust our management to more clinically apparent generalized convulsive type of seizures that are more life threatening than the nocturnal tonic seizures  Unfortunately, there has been no cognitive improvement since weaning off of valproate  Given the ongoing reports of seizures, we will transition levetiracetam to Briviact, which is a second generation SV2 synaptic vesicle protein inhibitor  Will start Briviact and reduce the dose of levetiracetam   Eventually, levetiracetam will be weaned off especially if she starts to have more aggressive agitated behaviors  She is nearly at the maximal dose of Epidiolex for her weight  The combination with Onfi can be sedating, which is why she is on a subtherapeutic dose of Onfi and only dosed during the night for sleep    Trying to balance her out to be more awake during the day time  Her VNS is rapid cycling, with a low battery life, she will be referred to Neurosurgery for VNS generator change  Plan:   1 - Referral to Dr Nicole Robbins for neurosurgery for VNS generator change (model M105 to [place] model)  2 - decrease Levetiracetam 100mg/mL to give 10mL every 12 hours  3 - start Briviact 10mg/mL give 5mL (50mg) every 12 hours  4 - continue with Topiramate 250mg twice a day  5 - continue Banzel 400mg give 4 tabs twice a day  6 - continue Epidiolex 100mg/mL 4 mL (400mg) twice a day   7 - continue Onfi 5mg at bedtime  8 - may use diazepam 5mg/mL oral solution, give 1mL by peg tube as needed for generalized tonic clonic seizure lasting more than 5 minutes  9 - follow-up in 3 months  10 - check CBC and CMP prior to the next visit  Problem List Items Addressed This Visit        Nervous and Auditory    Lennox-Gastaut syndrome with tonic seizures (HCC) - Primary    Relevant Medications    Brivaracetam (BRIVIACT) 10 MG/ML SOLN    cloBAZam (ONFI) 10 MG tablet    diazepam (VALIUM) 5 MG/ML solution    topiramate (TOPAMAX) 50 MG tablet    rufinamide (BANZEL) 400 mg tablet    levETIRAcetam (KEPPRA) 100 mg/mL oral solution    cannabidiol (EPIDIOLEX) 100 mg/ml SOLN    Other Relevant Orders    CBC    Comprehensive metabolic panel    Intractable epilepsy without status epilepticus (HCC)    Relevant Medications    Brivaracetam (BRIVIACT) 10 MG/ML SOLN    cloBAZam (ONFI) 10 MG tablet    topiramate (TOPAMAX) 50 MG tablet    rufinamide (BANZEL) 400 mg tablet    levETIRAcetam (KEPPRA) 100 mg/mL oral solution    cannabidiol (EPIDIOLEX) 100 mg/ml SOLN    Other Relevant Orders    Ambulatory referral to Neurosurgery        Chief Complaint:    Chief Complaint     Seizures        HPI:    Rojas Shetty is a 46 y o  unknown handed female here for follow-up evaluation of intractable epilepsy in the setting of LGS        Interval History 11/26/2019  There were a number of phone calls regarding breakthrough seizures  Telephone calls on:   9/27/2019 - small recurrent seizures in the morning, zoning out with whole body shaking,   10/30/2019 - 2 seizures over night used VNS magnet, helped  11/4/2019 - cluster of seizures 15-45 seconds (used diazepam and VNS)  11/19/2019 - 6 seizures over night 30 seconds    I spoke with Alyx Jermaine, the nursing unit, there are variable days of wakefulness and sleepiness  She just lays in bed, there has been no significant period of ambulation  AED/side effects/compliance:  Banzel 400mg give 4 tabs twice a day   Topiramate 250mg tab twice a day   Levetiracetam 100mg/mL give 10mL q8hrs  Epidiolex 400mg twice a day  Onfi 5mg at bedtime    Event/Seizure semiology:  Seizure semiology:  1  She was described to have drop attacks or spells with grunting sounds and falling to the floor  2  Her nurse commented on episodes of spasms or myoclonic jerking of the right arm  3  Generalized convulsions  4  Tonic seizures of eyes rolling back (mostly during sleep)    Prior Epilepsy History:  Collective epilepsy history 11/6/2014 was previously evaluated by Dr Herber Aranda including a hospitalization in February 2013 for status epilepticus, in the setting of missed doses of AEDs due to refusal to eat  She subsequently required anesthetic induced coma to stop her seizures and PEG tube was placed  She was seen almost every month for management of her seizures up until November 2013  She has medically refractory seizures and had a VNS placed possibly in the early 2000s and a generator changed in 2011  Unknown AEDs that were tried but felbamate is listed as an allergy  In 2011, her AEDs were clonazepam, levetiracetam, Depakote and Lamictal  Dr Herber Aranda had started [facility] in 2011  In 2012, Eloisa Parekh was added with the reduction of clonazepam  There were difficulty with documenting seizure frequency; but seizures were no longer daily occurrences but there were clusters of seizures   There would be good periods and bad periods of seizure frequency  Dr Génesis Parham had been increasing the duty cycle of the VNS over the course of 2012 to 2013  Sometime between August 2013 and October 2013, topiramate was introduced  She was in status epilepticus in 2013, she was on Keppra, Banzel, Onfi, and Lamictal  She had an EEG at some point that showed multifocal spike-wave and background attenuation  She has intermittent agitation and day time somnolence  When she was hospitalized for status epilepticus, she was started on methylphenidate to help wake her up  Intake history November 2014:  VPA level 127  Her Valproic Acid dose was previously 250mg q6hrs based on Dr Adamaris Carcamo notes  Since July 2014, she has been receiving VPA 500mg q6hrs  She has not been hospitalized since September 2013  She was previously ambulatory with therapy  She spends most of her time sleeping for the past couple of months  She has also been receiving lactulose for elevated ammonia levels  (older drugs VPA, LVT, LMG, newer drugs added on since 2011 RFN, Onfi, TPM)     Summary of 2015: We decreased VPA from TDD 2000mg to 1000mg with increased agitation/combativeness, alternating days of exhaustion (no behavioral specialist has been involved)  are randomly reported by multiple staff members  Seizures are typically reported by CNA's or her one to one on the 3PM to 11PM shift  Seizures are described a brief events of eyes rolling back and arms going up in the air, followed by hair pulling or slapping herself  These seizures were reported as either sporadic or every other day episodes  There have been no documented grand mal seizures or drop attacks  She refuses to wear safety helmet, rips at her hair, and attempts to flip herself out of her chair and bed  Gary Chance can walk briefly but walks on her toes, she frequently will allow herself to fall down when she does not want to walk    It does not appear that methylphenidate has been useful in maintaining her level of wakefulness during the day  VNS generator change on 11/3/2015  The Model 103 (serial [id number]) was replaced with MENABANQERonic Model 105, serial [id number]  Weaned off of levetiracetam due to multiple medications and agitation; by the end of 2016, she was down to -500  Summary of 2016:  Started with RFN 0488-9403, -250, CLB 0 5-0 5-25,  QID, -200 attempted to wean off of LEV and reduced the dose of VPA given that there were no reports of seizures and she was sedated, along with elevated ammonia levels  It was unclear as to how effective LEV was for her seizure control  When she missed a dose of TPM in September 2016, she had multiple seizures  We attempted to compensate for increase in seizures by increasing Onfi to 20-20; however, it seems that it made her more sedated  Summary of 2017  RFN [id number], -250, Onfi 20-20, -200,  q8hrs  She has been more somnolent  She continues to have tonic seizures when she is asleep, eyes rolling body, arms will tense up with some twitching, last a few seconds, but will recur  There are no seizures during the daytime  We attempted to wean off of VPA due to ammonia / somnolence; but there was an increase in tonic seizures (tonic stiffness, eyes rolling upwards, and subtle arm (right?) jerking)  Hospital admission 10/10-10/26/2017, due to seizures in setting of infection, was put on continuous video EEG monitoring to determine her seizure type, seizure frequency, and rapid medication adjustments  Rapid med changes: d/c'ed lamotrigine, restarted levetiracetam, attempted discontinuation of valproic acid (more seizures, so restarted), attempted reduction in Onfi, and starting Fycompa  The patient's seizures were mostly based during sleep, tonic seizures  Summary of 2018  Started with RFN [id number], PER 8, LEV 8482-8530, CLB 5-15, -250,  TID  Due to excessive somnolence Onfi was weaned off  Phenobarbital   She was on continuous EEG monitoring when she was in hospital for PNA that showed very frequent tonic seizures during sleep  VPA is causing hyperammoniemia  She has had more admissions for somnolence / lethargy, VPA was reduced for transaminitis  She was tested for inborn error of metabolism with plasma amino acid, urine organic acid, and acylcarnitine levels  There were nonspecific elevations in some amino acid or organic acid but no pattern consistent with a specific inborn error of metabolism  Elevated carnitine level was consistent with supplementation  Higher doses of phenobarbital may also contribute to sedation, phenobarbital was reduced to 20mg but on follow-up she was on 20mL of oral solution (80mg / day)  There is variable reporting in the frequency of tonic seizures (these are the eyes are rolling back and shaking, then stop, then happen again in 10-15 minutes)  Her level of alertness is also variable with erratic sleep cycle  Summary January-February 2019   TID, RFN 5697-6791, -200, LEV 9025-8554, PER 10, PB 40 qHS  In December 2018, Hospital admission for septic shock and aspiration PNA  She had an EEG that captured recurrent tonic seizures during sleep but this is consistently found in all of her other EEG studies  Another hospital admission January 2019 for recurrent convulsive seizures  Due to sedation Phenobarbital was weaned off and Fycompa was increased to 10mg at bedtime  TPM increased to 250-250  Tried to wean off of VPA due to ammonia levels  Awaiting Epidiolex  January 2019 - Her CNA reports that Ellington Hemp is no longer Keila Hemp, she is essentially bed ridden  She does not eat and does not walk  Ellington Hemp usually participate in activities, walking, and eating food (if she was at a [employer] she would be able to eat a hamburger without difficulty)    I was able to speak to her father Jarvis Campo and he reported that he too saw a significant decline in mental status  [facility] reporting an increase in seizure activity (brief episodes tonic-myoclonic jerking); but subsequent reports did not notice an increase in activity  Interval History 4/25/2019  -125, RUF [id number], -250, LEV 6736-4981, PER 8, -200  Patient saw Jocelin Mccoy on 3/18/2019 for follow-up  Patient is more alert/awake  Unclear if seizures are any less frequent; however, during the office visit there were about 8 tonic seizures lasting 15-20 seconds  Epidiolex was increased to 3mL twice a day, VPA decreased to 125-125, PER decreased to 8mg daily  However, based on the [facility], Epidiolex was still at 2mL twice a day  I called [facility], I spoke with Izzy Rushing  She generally is more in alert in the afternoon; but periods of wakefulness is hit and miss  She is no longer on a one to one  She is here with an aide who is familiar with Rosario Branch; her aide believes that Rosario Branch continues to have a few tonic seizures during the day  Her aide also reports that there are periods when Rosario Branch is more awake, like the other day she was getting a shower and she was playing with the shower head  Interval History 8/22/2019  RUF 5687-0216, [id number], LEV 1000 TID, -400, CLB 5-5  Since the last visit, we were trying to wean her off of valproic acid and discontinued it on 6/4/2019  However, she was admitted to hospital in June 2019 for status epilepticus (multiple clinical seizures, every 5-6 minutes, associated with apnea)  She was put on continuous EEG monitoring which showed very frequent tonic seizures lasting 15-30 seconds at a time, whether this is different from her baseline is difficult to determine  Since she was on continuous EEG monitoring a number of medication trials were given  At one point it seems that lorazepam and more levetiracetam had improved seizures    However this was only temporary, valproic acid was restarted on 2019  There was a trial of Onfi due to response with lorazepam   Then there was an elevation on LFTs  Perampanel was also weaned off  Her tube feed was adjust to more protein and lower carbohydrates  She is here Darryl Raymond, who is familiar with her  She seems to be better, but she still does not stand up to walk  There are times she does seem to be excessively sleepy  I called [facility], spoke with her nurse [address]  She has not had any witnessed seizures  However, she is more subdued and very tired  She is not her usual self, which usually consist of her yelling and throwing her toys  Special Features  Status epilepticus: yes  Self Injury Seizures: No  Precipitating Factors: menstrual cycle? ?     Epilepsy Risk Factors:  Abnormal pregnancy: unknown  Abnormal birth/: unknown  Abnormal Development: unknown developmental history  Febrile seizures, simple: unknown  Febrile seizures, complex: unknown  CNS infection: No  Mental retardation: Yes  Cerebral palsy: Yes  Head injury (moderate/severe): possibly anoxic brain injury  CNS neoplasm: No  CNS malformation: No  Neurosurgical procedure: No  Stroke: No  Alcohol abuse: No  Drug abuse: No  Family history Sz/epilepsy: unknown    Prior AEDs:  Rufinamide  Clobazam (excessive sedation)  Clonazepam  Levetiracetam (unclear if beneficial)  Lamotrigine (unclear if beneficial)  Topiramate (missed doses caused breakthrough seizures)  Valproate (elevated ammonia levels)  Fycompa (excess sedation)  Felbamate (listed as a drug allergy)  Phenobarbital (contributing to sedation)  Epidiolex (seems to help the most)    Prior workup:  x   Imaging:  CT head 2013  Normal CT of brain    CT head 2018  No acute intracranial pathology    3/20/2019  MRI brain w/wo contrast  Normal MR brain study  Symmetric hippocampal formations    EEGs:  Continuous video EEG monitoring 10/13  10/15/2017  Frequent generalized spike/polyspike-slow wave complexes during sleep  At times there are independent right more than left midtemporal spikes and bitemporal spikes    Innumerable generalized tonic seizures during sleep, generalized 1 Hz period discharges, that would become continuous for 30 seconds or generalized rhythmic alpha-beta discharges, associated with patient becoming rigid, tonic posturing with arms elevated and tense with subtle jerking of the upper body, eyes opening with upward deviation  Dr Dez Adams reported 4 ictal patterns:  1  1-3 seconds of diffuse electrodecrement then 2-7 seconds of fast activity then 2Hz spike wave discharges (no clinical manifestation)  2  same as #1, clinically accompanied by posturing of the arms, then clonic jerking  3  diffuse low fast activity without progressing to spike-wave discharges  4  2 Hz generalized discharges for 2-3 minutes with no clonical manifestations  By 10/15/2017  the tonic clonic seizures were less frequent    Continuous video EEG monitoring 10/18  10/25/2017  Diffuse background slowing with bursts of arrhythmic generalized spike wave discharges, with shifting lateralization, and independent left and  right temporal spikes  Mild eyelid myoclonia associated with brief bursts of 2-2 5 Hz generalized discharges  Tonic seizures with eelctrodecrement  There were innumerable tonic seizures; however by 10/25/2017 the frequency of these seizures did decrease in frequency  Continuous video EEG monitoring 12/1-12/5/2017  There are innumerable tonic seizures that are generally less than one minute in duration (30-40 seconds), these consists of subtle eye opening and tonic stiffening of arms, if longer then whole body stiffening with clonic jerking movements  These almost always occur exclusively out of sleep  These consist of 2-2 5 Hz generalized spike-slow wave complexes with generalized attenuation of activity (desynchronization) or paroxysmal fast activity    Per 24 hours count of seizures could be      12/19/2018 routine study  Frequent recurrent tonic seizures associated with paroxysmal generalized fast activity then generalized rhythmic discharges associated with eyes upward deviation, and myoclonic/clonic jerking of the upper body, excess beta activity  6/28-7/3/2019 continuous video EEG monitoring  Frequent clusters of tonic seizures (body rigidity, head flexions, eyes go up with dysconjugate gaze, and clonic activity of the arms for a few seconds), these are associated with GPFA and rhythmic spike-slow waves  There are also bilateral temporal focal epileptiform discharges      Labs:  Component      Latest Ref Rng & Units 7/26/2019 8/2/2019   WBC      4 31 - 10 16 Thousand/uL  8 85   Red Blood Cell Count      3 81 - 5 12 Million/uL  4 11   Hemoglobin      11 5 - 15 4 g/dL  13 0   HCT      34 8 - 46 1 %  41 5   Platelet Count      626 - 390 Thousands/uL  266   Sodium      136 - 145 mmol/L 143 138   Potassium      3 5 - 5 3 mmol/L 3 6 3 4 (L)   Chloride      100 - 108 mmol/L 107 106   CO2      21 - 32 mmol/L 24 20 (L)   Anion Gap      4 - 13 mmol/L 12 12   BUN      5 - 25 mg/dL 15 6   Creatinine      0 60 - 1 30 mg/dL 0 58 (L) 0 33 (L)   Glucose, Random      65 - 140 mg/dL 112 83   Calcium      8 3 - 10 1 mg/dL 9 8 8 4   AST      5 - 45 U/L 38 35   ALT      12 - 78 U/L 59 40   Alkaline Phosphatase      46 - 116 U/L 148 (H) 124 (H)   Total Protein      6 4 - 8 2 g/dL 8 5 (H) 6 7   Albumin      3 5 - 5 0 g/dL 4 1 3 0 (L)   TOTAL BILIRUBIN      0 20 - 1 00 mg/dL 0 40 0 40   eGFR      ml/min/1 73sq m 117 141   Total CK      26 - 192 U/L  27   LACTIC ACID      0 5 - 2 0 mmol/L  0 4 (L)     Component      Latest Ref Rng & Units 6/27/2019 6/29/2019 7/2/2019   TOPIRAMATE LEVEL      2 0 - 25 0 ug/mL 4 3  16 5   LEVETIRACETA (KEPPRA)      10 0 - 40 0 ug/mL 26 4     Rufinamide (Banzel)      ug/mL  29 1    Ammonia      11 - 35 umol/L   34     Component TOPIRAMATE LEVEL   Latest Ref Rng & Units 2 0 - 25 0 ug/mL   8/20/2018 8 1   9/6/2018 9 8   6/27/2019 4 3   7/2/2019 16 5     General exam   Blood pressure 100/61, pulse 92 ; [facility] reports that she is 98 8 pounds  Appearance: chronically ill, she is more awake, every now and then she looks like she is asleep, but she reaches out to my arm and tries to pinch me  Carotids: n/a  Cardiovascular: regular rate and rhythm  Pulmonary: clear to auscultation   Abdomen: soft to palpation    HEENT: anicteric   Fundoscopy: not assessed    Mental status  Orientation: nonverbal and alert  Fund of Knowledge: nonverbal   Attention and Concentration: nonverbal  Current and Remote Memory:nonverbal  Language: nonverbal    Cranial Nerves  CN 1: not tested  CN 2: pupils equal round reactive to direct and consenual light   CN 3, 4, 6: dysconjugate eyes, mild nystagmus with movement; left eye is more exotropic  CN 5:not assessed  CN 7:muscles of facial expression are symmetric and corneal reflex is intact symmetrically  CN 8:not assessed  CN 9, 10:not assessed  CN 11:not assessed  CN 12:not assessed    Motor:  Bulk, Tone: thin muscle build, relatively hypotonic tone  Pronation: not assessed  Strength: symmetric strength of the arm, wrists seems to be in a more flexed posture but not spastic    Sensory:  Lighttouch: not assessed due to cognitive impairment    Coordination: Unable to test    Reflexes: not assessed    Past Medical/Surgical History:  Patient Active Problem List   Diagnosis    Lennox-Gastaut syndrome with tonic seizures ([address] )    Underweight    Intellectual disability    Hyperammonemia (HCC)    Osteoporosis    Onychomycosis    Hyperkeratosis    Intractable epilepsy without status epilepticus ([address] )    Somnolence    Transaminitis    Incontinence    Skin breakdown    MRSA colonization    Hypoalbuminemia    Right atrial enlargement    Hypophosphatemia    Sedated due to multiple medications    Metabolic acidosis, increased anion gap (IAG)    Breast mass  S/P placement of VNS (vagus nerve stimulation) device    Protein calorie malnutrition (HCC)    Dislodged gastrostomy tube (HCC)    Acute cystitis with hematuria    Fatty infiltration of liver     Past Medical History:   Diagnosis Date    ADHD     Anoxic brain damage (HCC)     Autistic disorder     [facility] seizure [facility] [address] [facility] 8/1/2019    Hyperammonemia (HCC)     Hyperkeratosis     Hypotension     Intellectual disability     Lennox-Gastaut syndrome with tonic seizures (HCC)     Lethargy     Liver enzyme elevation     Onychomycosis     Osteoporosis     Osteoporosis      Past Surgical History:   Procedure Laterality Date    ABDOMINAL SURGERY      CARDIAC PACEMAKER PLACEMENT      vns implant l chest    IR PEG/GJ TUBE PLACEMENT  11/21/2019    IR THORACENTESIS  12/17/2018    JEJUNOSTOMY FEEDING TUBE      PEG TUBE PLACEMENT       Past Psychiatric History:  History of behavioral agitation but she is no longer on a one to one because she is generally too sedated      Medications:    Current Outpatient Medications:     acetaminophen (TYLENOL) 325 mg tablet, 2 tablets (650 mg total) by Per G Tube route every 6 (six) hours as needed for mild pain, Disp: 30 tablet, Rfl: 0    bisacodyl (BISCOLAX) 10 mg suppository, Insert 10 mg into the rectum daily at bedtime as needed for constipation (if no BM day #4), Disp: , Rfl:     cannabidiol (EPIDIOLEX) 100 mg/ml SOLN, 4 mL (400 mg total) by Per G Tube route 2 (two) times a day, Disp: 240 mL, Rfl: 5    cloBAZam (ONFI) 10 MG tablet, Give half a tab at bedtime, Disp: 15 tablet, Rfl: 5    diazepam (VALIUM) 5 MG/ML solution, Give 1mL by PEG tube PRN generalized tonic clonic seizure longer than 5 minutes or continuous multiple tonic seizures over 30 minutes, Disp: 30 mL, Rfl: 0    levETIRAcetam (KEPPRA) 100 mg/mL oral solution, 10 mL (1,000 mg total) by Per G Tube route every 12 (twelve) hours, Disp: 600 mL, Rfl: 5    magnesium hydroxide (MILK OF MAGNESIA) 400 mg/5 mL oral suspension, Take 30 mL by mouth daily as needed for constipation , Disp: , Rfl:     MELATONIN PO, 6 mg by Per G Tube route daily at bedtime, Disp: , Rfl:     Multiple Vitamins-Minerals (MULTIVITAMIN WITH IRON-MINERALS) liquid, 5 mL by Per G Tube route daily, Disp: , Rfl:     Probiotic Product (ALEXANDER-BID PROBIOTIC PO), 1 tablet by Per G Tube route daily  , Disp: , Rfl:     rufinamide (BANZEL) 400 mg tablet, 4 tablets (1,600 mg total) by Per G Tube route every 12 (twelve) hours, Disp: 240 tablet, Rfl: 5    topiramate (TOPAMAX) 50 MG tablet, 5 tablets (250 mg total) by Per G Tube route every 12 (twelve) hours, Disp: 300 tablet, Rfl: 5    Brivaracetam (BRIVIACT) 10 MG/ML SOLN, 5 mL (50 mg total) by Per PEG Tube route every 12 (twelve) hours, Disp: 300 mL, Rfl: 5    Allergies: Allergies   Allergen Reactions    Felbamate        Family history:  History reviewed  No pertinent family history  There is no family history of seizure, epilepsy or developmental delay  Social History  Living situation:  Resident of [facility]   reports that she has never smoked  She has never used smokeless tobacco  She reports that she drank alcohol  She reports that she does not use drugs  Review of Systems  Review of systems not obtained due to patient factors cognitive impairments    Decision making was of high-complexity due to the patient's high risk condition (seizures), psychiatric and neuropsychological comorbidities, behavioral problems, memory and cognitive problems and medication side effects        Neurology/Epilepsy Procedure Note    VNS Interrogation performed on 11/26/2019    Model: M105  S/N: [id number]  Date of implant: 11/3/2015    Current settings:  Output Current 2 mAmp   Signal Frequency 20 Hz   Pulse Width 250 microsec   Signal On Time 21 sec   Signal Off Time 0 8 min     Magnet settings:  Mag Output 2 25 mAmp   Pulse Width 500 microsec   Mag On Time 60 sec     Diagnostics:  Communication OK  Output current 2mAmp  Lead Impedance OK  Impedance value 1946 Ohms  IFI No  Battery indicator 11-25%    Lifetime Magnet activation 49  % stimulation 36% duty cycle

## 2019-11-26 NOTE — H&P (VIEW-ONLY)
[facility]  Patient's Name: Barbee Cowden   Patient's : 1980   Visit Type: follow-up  Referring MD / PCP:  Lynette Key MD     Assessment:  Ms Leno Kaur is a 46 y o  woman with Kirsten Nickels Syndrome, remote history of anoxic brain injury (however her MRI brain study is generally a normal MRI brain), multiple admissions for status epilepticus (however, her EEG studies consistently shows extremely frequent tonic seizures during periods of sleep), severe intellectual disability, and frequent medication adverse effects  Her EEG has features of both generalized and focal epilepsy; her focal seizures seem to occur from the bilateral temporal regions with rhythmic discharges, but no apparent clinical symptoms  She has an epileptic encephalopathy, which means that not only is she at risk for further cognitive decline from frequent seizures but from the underlying epilepsy  She likely has more frequent seizures than what is being reported, because her seizures during sleep are subtle, however, there is no clear recommendation for aggressive management  We will adjust our management to more clinically apparent generalized convulsive type of seizures that are more life threatening than the nocturnal tonic seizures  Unfortunately, there has been no cognitive improvement since weaning off of valproate  Given the ongoing reports of seizures, we will transition levetiracetam to Briviact, which is a second generation SV2 synaptic vesicle protein inhibitor  Will start Briviact and reduce the dose of levetiracetam   Eventually, levetiracetam will be weaned off especially if she starts to have more aggressive agitated behaviors  She is nearly at the maximal dose of Epidiolex for her weight  The combination with Onfi can be sedating, which is why she is on a subtherapeutic dose of Onfi and only dosed during the night for sleep    Trying to balance her out to be more awake during the day time  Her VNS is rapid cycling, with a low battery life, she will be referred to Neurosurgery for VNS generator change  Plan:   1 - Referral to Dr Omayra Jackson for neurosurgery for VNS generator change (model M105 to [place] model)  2 - decrease Levetiracetam 100mg/mL to give 10mL every 12 hours  3 - start Briviact 10mg/mL give 5mL (50mg) every 12 hours  4 - continue with Topiramate 250mg twice a day  5 - continue Banzel 400mg give 4 tabs twice a day  6 - continue Epidiolex 100mg/mL 4 mL (400mg) twice a day   7 - continue Onfi 5mg at bedtime  8 - may use diazepam 5mg/mL oral solution, give 1mL by peg tube as needed for generalized tonic clonic seizure lasting more than 5 minutes  9 - follow-up in 3 months  10 - check CBC and CMP prior to the next visit  Problem List Items Addressed This Visit        Nervous and Auditory    Lennox-Gastaut syndrome with tonic seizures (HCC) - Primary    Relevant Medications    Brivaracetam (BRIVIACT) 10 MG/ML SOLN    cloBAZam (ONFI) 10 MG tablet    diazepam (VALIUM) 5 MG/ML solution    topiramate (TOPAMAX) 50 MG tablet    rufinamide (BANZEL) 400 mg tablet    levETIRAcetam (KEPPRA) 100 mg/mL oral solution    cannabidiol (EPIDIOLEX) 100 mg/ml SOLN    Other Relevant Orders    CBC    Comprehensive metabolic panel    Intractable epilepsy without status epilepticus (HCC)    Relevant Medications    Brivaracetam (BRIVIACT) 10 MG/ML SOLN    cloBAZam (ONFI) 10 MG tablet    topiramate (TOPAMAX) 50 MG tablet    rufinamide (BANZEL) 400 mg tablet    levETIRAcetam (KEPPRA) 100 mg/mL oral solution    cannabidiol (EPIDIOLEX) 100 mg/ml SOLN    Other Relevant Orders    Ambulatory referral to Neurosurgery        Chief Complaint:    Chief Complaint     Seizures        HPI:    Mary Lima is a 46 y o  unknown handed female here for follow-up evaluation of intractable epilepsy in the setting of LGS        Interval History 11/26/2019  There were a number of phone calls regarding breakthrough seizures  Telephone calls on:   9/27/2019 - small recurrent seizures in the morning, zoning out with whole body shaking,   10/30/2019 - 2 seizures over night used VNS magnet, helped  11/4/2019 - cluster of seizures 15-45 seconds (used diazepam and VNS)  11/19/2019 - 6 seizures over night 30 seconds    I spoke with Killian Snider, the nursing unit, there are variable days of wakefulness and sleepiness  She just lays in bed, there has been no significant period of ambulation  AED/side effects/compliance:  Banzel 400mg give 4 tabs twice a day   Topiramate 250mg tab twice a day   Levetiracetam 100mg/mL give 10mL q8hrs  Epidiolex 400mg twice a day  Onfi 5mg at bedtime    Event/Seizure semiology:  Seizure semiology:  1  She was described to have drop attacks or spells with grunting sounds and falling to the floor  2  Her nurse commented on episodes of spasms or myoclonic jerking of the right arm  3  Generalized convulsions  4  Tonic seizures of eyes rolling back (mostly during sleep)    Prior Epilepsy History:  Collective epilepsy history 11/6/2014 was previously evaluated by Dr Meryle Bolus including a hospitalization in February 2013 for status epilepticus, in the setting of missed doses of AEDs due to refusal to eat  She subsequently required anesthetic induced coma to stop her seizures and PEG tube was placed  She was seen almost every month for management of her seizures up until November 2013  She has medically refractory seizures and had a VNS placed possibly in the early 2000s and a generator changed in 2011  Unknown AEDs that were tried but felbamate is listed as an allergy  In 2011, her AEDs were clonazepam, levetiracetam, Depakote and Lamictal  Dr Meryle Bolus had started [facility] in 2011  In 2012, Elvis Ross was added with the reduction of clonazepam  There were difficulty with documenting seizure frequency; but seizures were no longer daily occurrences but there were clusters of seizures   There would be good periods and bad periods of seizure frequency  Dr Cynthia Ortiz had been increasing the duty cycle of the VNS over the course of 2012 to 2013  Sometime between August 2013 and October 2013, topiramate was introduced  She was in status epilepticus in 2013, she was on Keppra, Banzel, Onfi, and Lamictal  She had an EEG at some point that showed multifocal spike-wave and background attenuation  She has intermittent agitation and day time somnolence  When she was hospitalized for status epilepticus, she was started on methylphenidate to help wake her up  Intake history November 2014:  VPA level 127  Her Valproic Acid dose was previously 250mg q6hrs based on Dr Chi Stout notes  Since July 2014, she has been receiving VPA 500mg q6hrs  She has not been hospitalized since September 2013  She was previously ambulatory with therapy  She spends most of her time sleeping for the past couple of months  She has also been receiving lactulose for elevated ammonia levels  (older drugs VPA, LVT, LMG, newer drugs added on since 2011 RFN, Onfi, TPM)     Summary of 2015: We decreased VPA from TDD 2000mg to 1000mg with increased agitation/combativeness, alternating days of exhaustion (no behavioral specialist has been involved)  are randomly reported by multiple staff members  Seizures are typically reported by CNA's or her one to one on the 3PM to 11PM shift  Seizures are described a brief events of eyes rolling back and arms going up in the air, followed by hair pulling or slapping herself  These seizures were reported as either sporadic or every other day episodes  There have been no documented grand mal seizures or drop attacks  She refuses to wear safety helmet, rips at her hair, and attempts to flip herself out of her chair and bed  Danette Delfina can walk briefly but walks on her toes, she frequently will allow herself to fall down when she does not want to walk    It does not appear that methylphenidate has been useful in maintaining her level of wakefulness during the day  VNS generator change on 11/3/2015  The Model 103 (serial [id number]) was replaced with my[employer] Model 105, serial [id number]  Weaned off of levetiracetam due to multiple medications and agitation; by the end of 2016, she was down to -500  Summary of 2016:  Started with RFN 6034-5888, -250, CLB 0 5-0 5-25,  QID, -200 attempted to wean off of LEV and reduced the dose of VPA given that there were no reports of seizures and she was sedated, along with elevated ammonia levels  It was unclear as to how effective LEV was for her seizure control  When she missed a dose of TPM in September 2016, she had multiple seizures  We attempted to compensate for increase in seizures by increasing Onfi to 20-20; however, it seems that it made her more sedated  Summary of 2017  RFN 3598-0062, -250, Onfi 20-20, -200,  q8hrs  She has been more somnolent  She continues to have tonic seizures when she is asleep, eyes rolling body, arms will tense up with some twitching, last a few seconds, but will recur  There are no seizures during the daytime  We attempted to wean off of VPA due to ammonia / somnolence; but there was an increase in tonic seizures (tonic stiffness, eyes rolling upwards, and subtle arm (right?) jerking)  Hospital admission 10/10-10/26/2017, due to seizures in setting of infection, was put on continuous video EEG monitoring to determine her seizure type, seizure frequency, and rapid medication adjustments  Rapid med changes: d/c'ed lamotrigine, restarted levetiracetam, attempted discontinuation of valproic acid (more seizures, so restarted), attempted reduction in Onfi, and starting Fycompa  The patient's seizures were mostly based during sleep, tonic seizures  Summary of 2018  Started with RFN 5140-4265, PER 8, LEV 6276-4116, CLB 5-15, -250,  TID  Due to excessive somnolence Onfi was weaned off  Phenobarbital   She was on continuous EEG monitoring when she was in hospital for PNA that showed very frequent tonic seizures during sleep  VPA is causing hyperammoniemia  She has had more admissions for somnolence / lethargy, VPA was reduced for transaminitis  She was tested for inborn error of metabolism with plasma amino acid, urine organic acid, and acylcarnitine levels  There were nonspecific elevations in some amino acid or organic acid but no pattern consistent with a specific inborn error of metabolism  Elevated carnitine level was consistent with supplementation  Higher doses of phenobarbital may also contribute to sedation, phenobarbital was reduced to 20mg but on follow-up she was on 20mL of oral solution (80mg / day)  There is variable reporting in the frequency of tonic seizures (these are the eyes are rolling back and shaking, then stop, then happen again in 10-15 minutes)  Her level of alertness is also variable with erratic sleep cycle  Summary January-February 2019   TID, RFN 3283-1095, -200, LEV 0796-4039, PER 10, PB 40 qHS  In December 2018, Hospital admission for septic shock and aspiration PNA  She had an EEG that captured recurrent tonic seizures during sleep but this is consistently found in all of her other EEG studies  Another hospital admission January 2019 for recurrent convulsive seizures  Due to sedation Phenobarbital was weaned off and Fycompa was increased to 10mg at bedtime  TPM increased to 250-250  Tried to wean off of VPA due to ammonia levels  Awaiting Epidiolex  January 2019 - Her CNA reports that Nata Burgess is no longer Nata Burgess, she is essentially bed ridden  She does not eat and does not walk  Nata Burgess usually participate in activities, walking, and eating food (if she was at a Psychiatric hospital she would be able to eat a hamburger without difficulty)    I was able to speak to her father Katy Parks and he reported that he too saw a significant decline in mental status  There was a phone call from Nahid Osborne Lizeth Tsai reporting an increase in seizure activity (brief episodes tonic-myoclonic jerking); but subsequent reports did not notice an increase in activity  Interval History 4/25/2019  -125, RUF 5959-4220, -250, LEV 6958-5366, PER 8, -200  Patient saw Piotr Self on 3/18/2019 for follow-up  Patient is more alert/awake  Unclear if seizures are any less frequent; however, during the office visit there were about 8 tonic seizures lasting 15-20 seconds  Epidiolex was increased to 3mL twice a day, VPA decreased to 125-125, PER decreased to 8mg daily  However, based on the [facility], Epidiolex was still at 2mL twice a day  I called [facility], I spoke with Galen Brown  She generally is more in alert in the afternoon; but periods of wakefulness is hit and miss  She is no longer on a one to one  She is here with an aide who is familiar with Cassie Gold; her aide believes that Cassie Gold continues to have a few tonic seizures during the day  Her aide also reports that there are periods when Cassie Gold is more awake, like the other day she was getting a shower and she was playing with the shower head  Interval History 8/22/2019  RUF [id number], [id number], LEV 1000 TID, -400, CLB 5-5  Since the last visit, we were trying to wean her off of valproic acid and discontinued it on 6/4/2019  However, she was admitted to hospital in June 2019 for status epilepticus (multiple clinical seizures, every 5-6 minutes, associated with apnea)  She was put on continuous EEG monitoring which showed very frequent tonic seizures lasting 15-30 seconds at a time, whether this is different from her baseline is difficult to determine  Since she was on continuous EEG monitoring a number of medication trials were given  At one point it seems that lorazepam and more levetiracetam had improved seizures    However this was only temporary, valproic acid was restarted on 2019  There was a trial of Onfi due to response with lorazepam   Then there was an elevation on LFTs  Perampanel was also weaned off  Her tube feed was adjust to more protein and lower carbohydrates  She is here Jese Beltran, who is familiar with her  She seems to be better, but she still does not stand up to walk  There are times she does seem to be excessively sleepy  I called Nahid Stanley Quin, spoke with her nurse [address]  She has not had any witnessed seizures  However, she is more subdued and very tired  She is not her usual self, which usually consist of her yelling and throwing her toys  Special Features  Status epilepticus: yes  Self Injury Seizures: No  Precipitating Factors: menstrual cycle? ?     Epilepsy Risk Factors:  Abnormal pregnancy: unknown  Abnormal birth/: unknown  Abnormal Development: unknown developmental history  Febrile seizures, simple: unknown  Febrile seizures, complex: unknown  CNS infection: No  Mental retardation: Yes  Cerebral palsy: Yes  Head injury (moderate/severe): possibly anoxic brain injury  CNS neoplasm: No  CNS malformation: No  Neurosurgical procedure: No  Stroke: No  Alcohol abuse: No  Drug abuse: No  Family history Sz/epilepsy: unknown    Prior AEDs:  Rufinamide  Clobazam (excessive sedation)  Clonazepam  Levetiracetam (unclear if beneficial)  Lamotrigine (unclear if beneficial)  Topiramate (missed doses caused breakthrough seizures)  Valproate (elevated ammonia levels)  Fycompa (excess sedation)  Felbamate (listed as a drug allergy)  Phenobarbital (contributing to sedation)  Epidiolex (seems to help the most)    Prior workup:  x   Imaging:  CT head 2013  Normal CT of brain    CT head 2018  No acute intracranial pathology    3/20/2019  MRI brain w/wo contrast  Normal MR brain study  Symmetric hippocampal formations    EEGs:  Continuous video EEG monitoring 10/13  10/15/2017  Frequent generalized spike/polyspike-slow wave complexes during sleep  At times there are independent right more than left midtemporal spikes and bitemporal spikes    Innumerable generalized tonic seizures during sleep, generalized 1 Hz period discharges, that would become continuous for 30 seconds or generalized rhythmic alpha-beta discharges, associated with patient becoming rigid, tonic posturing with arms elevated and tense with subtle jerking of the upper body, eyes opening with upward deviation  Dr Ness Hector reported 4 ictal patterns:  1  1-3 seconds of diffuse electrodecrement then 2-7 seconds of fast activity then 2Hz spike wave discharges (no clinical manifestation)  2  same as #1, clinically accompanied by posturing of the arms, then clonic jerking  3  diffuse low fast activity without progressing to spike-wave discharges  4  2 Hz generalized discharges for 2-3 minutes with no clonical manifestations  By 10/15/2017  the tonic clonic seizures were less frequent    Continuous video EEG monitoring 10/18  10/25/2017  Diffuse background slowing with bursts of arrhythmic generalized spike wave discharges, with shifting lateralization, and independent left and  right temporal spikes  Mild eyelid myoclonia associated with brief bursts of 2-2 5 Hz generalized discharges  Tonic seizures with eelctrodecrement  There were innumerable tonic seizures; however by 10/25/2017 the frequency of these seizures did decrease in frequency  Continuous video EEG monitoring 12/1-12/5/2017  There are innumerable tonic seizures that are generally less than one minute in duration (30-40 seconds), these consists of subtle eye opening and tonic stiffening of arms, if longer then whole body stiffening with clonic jerking movements  These almost always occur exclusively out of sleep  These consist of 2-2 5 Hz generalized spike-slow wave complexes with generalized attenuation of activity (desynchronization) or paroxysmal fast activity    Per 24 hours count of seizures could be      12/19/2018 routine study  Frequent recurrent tonic seizures associated with paroxysmal generalized fast activity then generalized rhythmic discharges associated with eyes upward deviation, and myoclonic/clonic jerking of the upper body, excess beta activity  6/28-7/3/2019 continuous video EEG monitoring  Frequent clusters of tonic seizures (body rigidity, head flexions, eyes go up with dysconjugate gaze, and clonic activity of the arms for a few seconds), these are associated with GPFA and rhythmic spike-slow waves  There are also bilateral temporal focal epileptiform discharges      Labs:  Component      Latest Ref Rng & Units 7/26/2019 8/2/2019   WBC      4 31 - 10 16 Thousand/uL  8 85   Red Blood Cell Count      3 81 - 5 12 Million/uL  4 11   Hemoglobin      11 5 - 15 4 g/dL  13 0   HCT      34 8 - 46 1 %  41 5   Platelet Count      464 - 390 Thousands/uL  266   Sodium      136 - 145 mmol/L 143 138   Potassium      3 5 - 5 3 mmol/L 3 6 3 4 (L)   Chloride      100 - 108 mmol/L 107 106   CO2      21 - 32 mmol/L 24 20 (L)   Anion Gap      4 - 13 mmol/L 12 12   BUN      5 - 25 mg/dL 15 6   Creatinine      0 60 - 1 30 mg/dL 0 58 (L) 0 33 (L)   Glucose, Random      65 - 140 mg/dL 112 83   Calcium      8 3 - 10 1 mg/dL 9 8 8 4   AST      5 - 45 U/L 38 35   ALT      12 - 78 U/L 59 40   Alkaline Phosphatase      46 - 116 U/L 148 (H) 124 (H)   Total Protein      6 4 - 8 2 g/dL 8 5 (H) 6 7   Albumin      3 5 - 5 0 g/dL 4 1 3 0 (L)   TOTAL BILIRUBIN      0 20 - 1 00 mg/dL 0 40 0 40   eGFR      ml/min/1 73sq m 117 141   Total CK      26 - 192 U/L  27   LACTIC ACID      0 5 - 2 0 mmol/L  0 4 (L)     Component      Latest Ref Rng & Units 6/27/2019 6/29/2019 7/2/2019   TOPIRAMATE LEVEL      2 0 - 25 0 ug/mL 4 3  16 5   LEVETIRACETA (KEPPRA)      10 0 - 40 0 ug/mL 26 4     Rufinamide (Banzel)      ug/mL  29 1    Ammonia      11 - 35 umol/L   34     Component TOPIRAMATE LEVEL   Latest Ref Rng & Units 2 0 - 25 0 ug/mL   8/20/2018 8 1   9/6/2018 9 8   6/27/2019 4 3   7/2/2019 16 5     General exam   Blood pressure 100/61, pulse 92 ; [place] nursing reports that she is 98 8 pounds  Appearance: chronically ill, she is more awake, every now and then she looks like she is asleep, but she reaches out to my arm and tries to pinch me  Carotids: n/a  Cardiovascular: regular rate and rhythm  Pulmonary: clear to auscultation   Abdomen: soft to palpation    HEENT: anicteric   Fundoscopy: not assessed    Mental status  Orientation: nonverbal and alert  Fund of Knowledge: nonverbal   Attention and Concentration: nonverbal  Current and Remote Memory:nonverbal  Language: nonverbal    Cranial Nerves  CN 1: not tested  CN 2: pupils equal round reactive to direct and consenual light   CN 3, 4, 6: dysconjugate eyes, mild nystagmus with movement; left eye is more exotropic  CN 5:not assessed  CN 7:muscles of facial expression are symmetric and corneal reflex is intact symmetrically  CN 8:not assessed  CN 9, 10:not assessed  CN 11:not assessed  CN 12:not assessed    Motor:  Bulk, Tone: thin muscle build, relatively hypotonic tone  Pronation: not assessed  Strength: symmetric strength of the arm, wrists seems to be in a more flexed posture but not spastic    Sensory:  Lighttouch: not assessed due to cognitive impairment    Coordination: Unable to test    Reflexes: not assessed    Past Medical/Surgical History:  Patient Active Problem List   Diagnosis    Lennox-Gastaut syndrome with tonic seizures ([address] )    Underweight    Intellectual disability    Hyperammonemia (HCC)    Osteoporosis    Onychomycosis    Hyperkeratosis    Intractable epilepsy without status epilepticus ([address] )    Somnolence    Transaminitis    Incontinence    Skin breakdown    MRSA colonization    Hypoalbuminemia    Right atrial enlargement    Hypophosphatemia    Sedated due to multiple medications    Metabolic acidosis, increased anion gap (IAG)    Breast mass  S/P placement of VNS (vagus nerve stimulation) device    Protein calorie malnutrition (HCC)    Dislodged gastrostomy tube (HCC)    Acute cystitis with hematuria    Fatty infiltration of liver     Past Medical History:   Diagnosis Date    ADHD     Anoxic brain damage (HCC)     Autistic disorder     Breakthrough seizure ([facility] ) 8/1/2019    Hyperammonemia (HCC)     Hyperkeratosis     Hypotension     Intellectual disability     Lennox-Gastaut syndrome with tonic seizures (HCC)     Lethargy     Liver enzyme elevation     Onychomycosis     Osteoporosis     Osteoporosis      Past Surgical History:   Procedure Laterality Date    ABDOMINAL SURGERY      CARDIAC PACEMAKER PLACEMENT      vns implant l chest    IR PEG/GJ TUBE PLACEMENT  11/21/2019    IR THORACENTESIS  12/17/2018    JEJUNOSTOMY FEEDING TUBE      PEG TUBE PLACEMENT       Past Psychiatric History:  History of behavioral agitation but she is no longer on a one to one because she is generally too sedated      Medications:    Current Outpatient Medications:     acetaminophen (TYLENOL) 325 mg tablet, 2 tablets (650 mg total) by Per G Tube route every 6 (six) hours as needed for mild pain, Disp: 30 tablet, Rfl: 0    bisacodyl (BISCOLAX) 10 mg suppository, Insert 10 mg into the rectum daily at bedtime as needed for constipation (if no BM day #4), Disp: , Rfl:     cannabidiol (EPIDIOLEX) 100 mg/ml SOLN, 4 mL (400 mg total) by Per G Tube route 2 (two) times a day, Disp: 240 mL, Rfl: 5    cloBAZam (ONFI) 10 MG tablet, Give half a tab at bedtime, Disp: 15 tablet, Rfl: 5    diazepam (VALIUM) 5 MG/ML solution, Give 1mL by PEG tube PRN generalized tonic clonic seizure longer than 5 minutes or continuous multiple tonic seizures over 30 minutes, Disp: 30 mL, Rfl: 0    levETIRAcetam (KEPPRA) 100 mg/mL oral solution, 10 mL (1,000 mg total) by Per G Tube route every 12 (twelve) hours, Disp: 600 mL, Rfl: 5    magnesium hydroxide (MILK OF MAGNESIA) 400 mg/5 mL oral suspension, Take 30 mL by mouth daily as needed for constipation , Disp: , Rfl:     MELATONIN PO, 6 mg by Per G Tube route daily at bedtime, Disp: , Rfl:     Multiple Vitamins-Minerals (MULTIVITAMIN WITH IRON-MINERALS) liquid, 5 mL by Per G Tube route daily, Disp: , Rfl:     Probiotic Product (ALEXANDER-BID PROBIOTIC PO), 1 tablet by Per G Tube route daily  , Disp: , Rfl:     rufinamide (BANZEL) 400 mg tablet, 4 tablets (1,600 mg total) by Per G Tube route every 12 (twelve) hours, Disp: 240 tablet, Rfl: 5    topiramate (TOPAMAX) 50 MG tablet, 5 tablets (250 mg total) by Per G Tube route every 12 (twelve) hours, Disp: 300 tablet, Rfl: 5    Brivaracetam (BRIVIACT) 10 MG/ML SOLN, 5 mL (50 mg total) by Per PEG Tube route every 12 (twelve) hours, Disp: 300 mL, Rfl: 5    Allergies: Allergies   Allergen Reactions    Felbamate        Family history:  History reviewed  No pertinent family history  There is no family history of seizure, epilepsy or developmental delay  Social History  Living situation:  Resident of [facility]   reports that she has never smoked  She has never used smokeless tobacco  She reports that she drank alcohol  She reports that she does not use drugs  Review of Systems  Review of systems not obtained due to patient factors cognitive impairments    Decision making was of high-complexity due to the patient's high risk condition (seizures), psychiatric and neuropsychological comorbidities, behavioral problems, memory and cognitive problems and medication side effects        Neurology/Epilepsy Procedure Note    VNS Interrogation performed on 11/26/2019    Model: [id number]  S/N: [id number]  Date of implant: 11/3/2015    Current settings:  Output Current 2 mAmp   Signal Frequency 20 Hz   Pulse Width 250 microsec   Signal On Time 21 sec   Signal Off Time 0 8 min     Magnet settings:  Mag Output 2 25 mAmp   Pulse Width 500 microsec   Mag On Time 60 sec     Diagnostics:  Communication OK  Output current 2mAmp  Lead Impedance OK  Impedance value 1946 Ohms  IFI No  Battery indicator 11-25%    Lifetime Magnet activation 49  % stimulation 36% duty cycle

## 2019-12-02 ENCOUNTER — TELEPHONE (OUTPATIENT)
Dept: NEUROSURGERY | Facility: CLINIC | Age: 39
End: 2019-12-02

## 2019-12-06 NOTE — TELEPHONE ENCOUNTER
Spoke with Guillermo Anderson, patient's father, I explained patient has appointment Tuesday @ 10am and it is important that Jake Isaacs be at the appointment to sign for surgery if needed since she is POA  He said she is at home with bronchitis and he wll get the message to her, he said I could try the home # because she is there  I tried home # and I got VM, I left [facility] to call back

## 2019-12-09 NOTE — TELEPHONE ENCOUNTER
Received VM from mother, I called back and got Patient's father, he said he would be driving the mother down and they will both be here for appointment

## 2019-12-10 ENCOUNTER — CONSULT (OUTPATIENT)
Dept: NEUROSURGERY | Facility: CLINIC | Age: 39
End: 2019-12-10
Payer: MEDICARE

## 2019-12-10 VITALS — DIASTOLIC BLOOD PRESSURE: 68 MMHG | SYSTOLIC BLOOD PRESSURE: 108 MMHG

## 2019-12-10 DIAGNOSIS — F79 INTELLECTUAL DISABILITY WITH EPILEPSY (HCC): ICD-10-CM

## 2019-12-10 DIAGNOSIS — G40.919 INTRACTABLE EPILEPSY WITHOUT STATUS EPILEPTICUS, UNSPECIFIED EPILEPSY TYPE (HCC): ICD-10-CM

## 2019-12-10 DIAGNOSIS — F90.0 ADHD, PREDOMINANTLY INATTENTIVE TYPE: ICD-10-CM

## 2019-12-10 DIAGNOSIS — G40.812 LENNOX-GASTAUT SYNDROME WITH TONIC SEIZURES (HCC): Primary | ICD-10-CM

## 2019-12-10 DIAGNOSIS — Z01.818 PREOPERATIVE EXAMINATION: ICD-10-CM

## 2019-12-10 DIAGNOSIS — G93.1 CEREBRAL ANOXIC INJURY (HCC): ICD-10-CM

## 2019-12-10 DIAGNOSIS — G40.909 INTELLECTUAL DISABILITY WITH EPILEPSY (HCC): ICD-10-CM

## 2019-12-10 DIAGNOSIS — Z96.89 S/P PLACEMENT OF VNS (VAGUS NERVE STIMULATION) DEVICE: ICD-10-CM

## 2019-12-10 PROCEDURE — 99204 OFFICE O/P NEW MOD 45 MIN: CPT | Performed by: NURSE PRACTITIONER

## 2019-12-10 RX ORDER — CHLORHEXIDINE GLUCONATE 0.12 MG/ML
15 RINSE ORAL ONCE
Status: CANCELLED | OUTPATIENT
Start: 2019-12-10 | End: 2019-12-10

## 2019-12-10 NOTE — PROGRESS NOTES
Assessment/Plan:  Diagnoses and all orders for this visit:    Lennox-Gastaut syndrome with tonic seizures ([facility] )    Intractable epilepsy without status epilepticus, unspecified epilepsy type ([facility] )  -     Ambulatory referral to Neurosurgery  -     UA w Reflex to Microscopic w Reflex to Culture  -     Comprehensive metabolic panel; Future  -     CBC and differential; Future  -     APTT; Future  -     Protime-INR; Future  -     hCG, quantitative; Future  -     HEMOGLOBIN A1C W/ EAG ESTIMATION; Future    S/P placement of VNS (vagus nerve stimulation) device    Intellectual disability with epilepsy     Cerebral anoxic injury ([facility] )    ADHD, predominantly inattentive type     Preoperative examination        Subjective:   Consult for VNS end of life nearing EOL   Patient ID: Mariola Morgan is a 46 y o  female  HPI  She presents via liter accompanied by mother , father, ambulance crew, and medical assistant from [address] [place] [address], where patient is a long-term resident  She presents as a consultative appointment for replacement of Vagel Nerve stimulator  She is a patient of Dr Zarina Fuentes neurologist, who manages her vagal nerve stimulator (VNS),  inserted in 2000's  VNS generator was last replaced in 2015, registering at the remaining lifeof 11-25 % range  She has a longstanding history of  intractable epilepsy/Lennox Gastuat syndrome,  anoxic encephalopathy, status epilepticus several times, and cognitive impairment  She is treating with VNS and AED consisting of Brivaracetam, Keppra, topiramate, and cannabidiol  AED medications are causing somnolence  A review of Dr Freeman Brain notes indicated almost continued daily seizures tonic during sleep  Preoperative Assessments:    Dr Thais Teague will telephone father (mother will not be available, she gave verbal permission to contact father) and explain surgical procedure and benefits after which surgical consent will be obtained        Prior General Anesthesia Reactions----denies :    Exercise Capacity ---she is wheelchair bound and nonambulatory  HENT; she is nonverbal       Nicotine dependence  ----  Never smoked    Personal history of venous thromboembolic disease--denies   Bleeding Tendency ---denies easily bruising  HO MRSA colonization-- unk site   GI: PEG tube on enteral feeds glycernia 1 2 shaneka  50/hr [id number]  HO Cdiff colitis   Nickel or metal reaction/allergy--denies   History of cancer or other health condition that requires routine MRI imagining---VNS system negates ability to have MRI  Surgery Clearance;  PCP/Medical Clearance- pending completion, instructed to please complete by Friday 12/13   Cardiac-  Not required   PAT---pending completion, instructed to please complete by Friday 12/13  Imagining requirement--non required for procedure      In preparation for surgery the following information is provided to [facility] faxed     Preoperative written instructions provided--faxed to [facility]     Medication exclusion  list provided and reviewed - do not take 7 days prior surgery or 14 days postoperatively including  OTC, supplements,  ASA containing  products (Excedrin migraine) and NSAID  Explained pre operative skin preparation hygiene care use of products Hibiclens (chlorhexidine) and use as per protocol 3 days preop and AM of surgery  Explained measures performed on surgery day preop to decrease infection risk  Read and review the education booklet provided by the OR  several weeks ago  Informed an antibiotic will be prescribed the day prior surgery,   start night of surgery and continue as directed  Postoperative activity restrictions were reviewed , follow the basic "BLTs" no bending, no lifting greater than 10 lbs  , no twisting, and no stretching thru 6 weeks post op  Can ambulate as tolerated immediately post op     Avoid repetitive pushing, pulling , or rotating movement of upper extremities in particular on operative side   Will receive postoperative discharge paperwork with care instructions explaining incision care and general body hygiene instructions such as when showers can resume  Postoperative pain management and postoperative opioid induced constipation and bowel hygiene measures discussed but for this type surgery extra strength is usually adequate for pain relief (1 tab every 4 hours , 2 tabs every 8 hours    Postoperative follow-up appointments were reviewed for 2 week visit  I have spent 60 minutes with patient/mother/and father/NH staff today in which greater than 50% of this time was spent in counseling/coordination of care regarding proposed surgical procedure and impressions, family and NH staff verbalized an understanding all questions and were provided with contact information if additional questions arise  The following portions of the patient's history were reviewed and updated as appropriate:   She  has a past medical history of ADHD, Anoxic brain damage ([facility] ), Autistic disorder, Breakthrough seizure ([facility] ) (8/1/2019), Hyperammonemia ([facility] ), Hyperkeratosis, Hypotension, Intellectual disability, Lennox-Gastaut syndrome with tonic seizures (HCC), Lethargy, Liver enzyme elevation, Onychomycosis, Osteoporosis, and Osteoporosis    She   Patient Active Problem List    Diagnosis Date Noted    Preoperative examination 12/10/2019    Acute cystitis with hematuria 08/02/2019    Fatty infiltration of liver 08/02/2019    Protein calorie malnutrition ([facility] ) 07/31/2019    Dislodged gastrostomy tube ([facility] ) 07/31/2019    S/P placement of VNS (vagus nerve stimulation) device 03/18/2019    Breast mass 63/20/1963    Metabolic acidosis, increased anion gap (IAG) 01/14/2019    Sedated due to multiple medications 01/10/2019    Hypophosphatemia 12/18/2018    Right atrial enlargement 10/13/2018    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    Hypoalbuminemia 10/10/2018    Skin breakdown 10/09/2018    Incontinence 08/23/2018  Somnolence 08/18/2018    Transaminitis 08/18/2018    Intractable epilepsy without status epilepticus ([facility] ) 04/23/2018    Hyperammonemia ([place] [address] ) 11/23/2017    Underweight 11/22/2017    Intellectual disability 11/22/2017    Lennox-Gastaut syndrome with tonic seizures ([facility] ) 07/06/2017    Osteoporosis 09/17/2013    Onychomycosis 09/17/2013    Hyperkeratosis 09/17/2013    Cerebral anoxic injury ([place] [address] ) 09/17/2013     She  has a past surgical history that includes Jejunostomy feeding tube; PEG tube placement; Abdominal surgery; IR thoracentesis (12/17/2018); Cardiac pacemaker placement; and IR PEG/GJ tube placement (11/21/2019)  Her family history is not on file  She  reports that she has never smoked  She has never used smokeless tobacco  She reports that she drank alcohol  She reports that she does not use drugs    Current Outpatient Medications   Medication Sig Dispense Refill    acetaminophen (TYLENOL) 325 mg tablet 2 tablets (650 mg total) by Per G Tube route every 6 (six) hours as needed for mild pain 30 tablet 0    bisacodyl (BISCOLAX) 10 mg suppository Insert 10 mg into the rectum daily at bedtime as needed for constipation (if no BM day #4)      Brivaracetam (BRIVIACT) 10 MG/ML SOLN 5 mL (50 mg total) by Per PEG Tube route every 12 (twelve) hours 300 mL 5    cannabidiol (EPIDIOLEX) 100 mg/ml SOLN 4 mL (400 mg total) by Per G Tube route 2 (two) times a day 240 mL 5    cloBAZam (ONFI) 10 MG tablet Give half a tab at bedtime 15 tablet 5    diazepam (VALIUM) 5 MG/ML solution Give 1mL by PEG tube PRN generalized tonic clonic seizure longer than 5 minutes or continuous multiple tonic seizures over 30 minutes 30 mL 0    levETIRAcetam (KEPPRA) 100 mg/mL oral solution 10 mL (1,000 mg total) by Per G Tube route every 12 (twelve) hours 600 mL 5    magnesium hydroxide (MILK OF MAGNESIA) 400 mg/5 mL oral suspension Take 30 mL by mouth daily as needed for constipation       MELATONIN PO 6 mg by Per G Tube route daily at bedtime      Multiple Vitamins-Minerals (MULTIVITAMIN WITH IRON-MINERALS) liquid 5 mL by Per G Tube route daily      Probiotic Product (ALEXANDER-BID PROBIOTIC PO) 1 tablet by Per G Tube route daily        rufinamide (BANZEL) 400 mg tablet 4 tablets (1,600 mg total) by Per G Tube route every 12 (twelve) hours 240 tablet 5    topiramate (TOPAMAX) 50 MG tablet 5 tablets (250 mg total) by Per G Tube route every 12 (twelve) hours 300 tablet 5     No current facility-administered medications for this visit  She is allergic to felbamate       Review of Systems   Unable to perform ROS: Patient nonverbal         Objective:  Vitals:    12/10/19 0925   BP: 108/68   BP Location: Left arm   Patient Position: Sitting   Cuff Size: Adult       /68 (BP Location: Left arm, Patient Position: Sitting, Cuff Size: Adult)   LMP  (LMP Unknown)   Unable to obtain full set of vitals patient uncooperative  Physical Exam   Constitutional:   Restless moving head side to side reaches out with arm when in close proximity    HENT:   Head: Normocephalic and atraumatic  Non verbal, chidi not make any sounds   Eyes: EOM are normal  Right eye exhibits no discharge  No scleral icterus  Cardiovascular: Normal rate and regular rhythm  Pulmonary/Chest:   Difficult to assess breath sounds does not follow instructions , reaching out for stethoscope    Abdominal: Soft  She exhibits no distension  Musculoskeletal:   Flexed posture of risks not spastic    Nursing note and vitals reviewed  Neurologic Exam     Cranial Nerves     CN III, IV, VI   Extraocular motions are normal      CN VII   Facial expression full, symmetric  Right facial weakness: none  Left facial weakness: none    Motor Exam   Muscle bulk: decreased (thin muscle built hypotonic)  Right leg tone: flexon of knees not spastic

## 2019-12-11 NOTE — PRE-PROCEDURE INSTRUCTIONS
Pre-Surgery Instructions:   Medication Instructions    acetaminophen (TYLENOL) 325 mg tablet Instructed patient per Anesthesia Guidelines   bisacodyl (BISCOLAX) 10 mg suppository Instructed patient per Anesthesia Guidelines   bisacodyl (FLEET BISACODYL) 10 MG/30ML ENEM Instructed patient per Anesthesia Guidelines   Brivaracetam (BRIVIACT) 10 MG/ML SOLN Instructed patient per Anesthesia Guidelines   cannabidiol (EPIDIOLEX) 100 mg/ml SOLN Instructed patient per Anesthesia Guidelines   cloBAZam (ONFI) 10 MG tablet Instructed patient per Anesthesia Guidelines   diazepam (VALIUM) 5 MG/ML solution Instructed patient per Anesthesia Guidelines   levETIRAcetam (KEPPRA) 100 mg/mL oral solution Instructed patient per Anesthesia Guidelines   magnesium hydroxide (MILK OF MAGNESIA) 400 mg/5 mL oral suspension Instructed patient per Anesthesia Guidelines   MELATONIN PO Instructed patient per Anesthesia Guidelines   Multiple Vitamins-Minerals (MULTIVITAMIN WITH IRON-MINERALS) liquid Instructed patient per Anesthesia Guidelines   Probiotic Product (ALEXANDER-BID PROBIOTIC PO) Instructed patient per Anesthesia Guidelines   rufinamide (BANZEL) 400 mg tablet Instructed patient per Anesthesia Guidelines   topiramate (TOPAMAX) 50 MG tablet Instructed patient per Anesthesia Guidelines  Nothing through Peg tube after MN DOS 12/18/2019  Starting today hold Melatonin,Multivitamins,probiotic products, don't give any NSAID'S prior day of surgery  Showering  as per Dr Graciela Childs office instructions  Acetaminophen Med Class   Continue to take this medication on your normal schedule  If this is an oral medication and you take it in the morning, then you may take this medicine with a sip of water  Antiepileptic Med Class   Continue to take this medication on your normal schedule    If this is an oral medication and you take it in the morning, then you may take this medicine with a sip of water     Benzodiazepine antagonist Med Class   If this medication is needed please continue to take on your normal schedule  If you take it in the morning, then you may take this medicine with a sip of water  Herbal Med Class   Stop taking this herbal medications at least one week prior to surgery/procedure  Vitamin Med Class   You may continue to take any vitamin that your surgeon has prescribed to you up to the day before surgery  If your surgeon has not specifically prescribed this vitamin or instructed you to continue then stop taking 7 days prior to surgery    Acetaminophen Med Class   Continue to take this medication on your normal schedule  If this is an oral medication and you take it in the morning, then you may take this medicine with a sip of water  Antiepileptic Med Class   Continue to take this medication on your normal schedule  If this is an oral medication and you take it in the morning, then you may take this medicine with a sip of water  Benzodiazepine antagonist Med Class   If this medication is needed please continue to take on your normal schedule  If you take it in the morning, then you may take this medicine with a sip of water  Herbal Med Class   Stop taking this herbal medications at least one week prior to surgery/procedure  Vitamin Med Class   You may continue to take any vitamin that your surgeon has prescribed to you up to the day before surgery  If your surgeon has not specifically prescribed this vitamin or instructed you to continue then stop taking 7 days prior to surgery    Acetaminophen Med Class   Continue to take this medication on your normal schedule  If this is an oral medication and you take it in the morning, then you may take this medicine with a sip of water  Antiepileptic Med Class   Continue to take this medication on your normal schedule    If this is an oral medication and you take it in the morning, then you may take this medicine with a sip of water  Benzodiazepine antagonist Med Class   If this medication is needed please continue to take on your normal schedule  If you take it in the morning, then you may take this medicine with a sip of water  Herbal Med Class   Stop taking this herbal medications at least one week prior to surgery/procedure  Vitamin Med Class   You may continue to take any vitamin that your surgeon has prescribed to you up to the day before surgery   If your surgeon has not specifically prescribed this vitamin or instructed you to continue then stop taking 7 days prior to surgery

## 2019-12-14 NOTE — ASSESSMENT & PLAN NOTE
----- Message from Torri Rivera LPN sent at 12/13/2019 11:15 AM CST -----  Contact: Marcelo garcia [phone]  Sent to the wrong clinic not seen in resident clinic  ----- Message -----  From: Georgina Gibson  Sent: 12/13/2019  10:52 AM CST  To: [facility] Medicine Residents    Patient Brother will like to speak to the nurse in regarding an copy of ER visit pretending to plan of care, the information will need to be fax to [facility] 'Ophtalmology' [phone] before the 12/17/19, please advise.     · Likely secondary to valproic acid  · Continue lactulose upon discharge for a goal of 2-3 soft bowel movements per day

## 2019-12-17 ENCOUNTER — ANESTHESIA EVENT (OUTPATIENT)
Dept: PERIOP | Facility: HOSPITAL | Age: 39
End: 2019-12-17
Payer: MEDICARE

## 2019-12-17 ENCOUNTER — TELEPHONE (OUTPATIENT)
Dept: NEUROSURGERY | Facility: CLINIC | Age: 39
End: 2019-12-17

## 2019-12-17 ENCOUNTER — DOCUMENTATION (OUTPATIENT)
Dept: NEUROSURGERY | Facility: CLINIC | Age: 39
End: 2019-12-17

## 2019-12-17 DIAGNOSIS — G89.18 POSTOPERATIVE PAIN: Primary | ICD-10-CM

## 2019-12-17 DIAGNOSIS — Z98.890 POSTOPERATIVE STATE: ICD-10-CM

## 2019-12-17 RX ORDER — ACETAMINOPHEN 160 MG/5ML
15 SUSPENSION, ORAL (FINAL DOSE FORM) ORAL EVERY 6 HOURS PRN
Qty: 118 ML | Refills: 0 | Status: SHIPPED | OUTPATIENT
Start: 2019-12-17 | End: 2020-10-13

## 2019-12-17 RX ORDER — CEPHALEXIN 250 MG/5ML
500 POWDER, FOR SUSPENSION ORAL EVERY 6 HOURS SCHEDULED
Qty: 120 ML | Refills: 0 | Status: SHIPPED | OUTPATIENT
Start: 2019-12-17 | End: 2019-12-20

## 2019-12-17 NOTE — TELEPHONE ENCOUNTER
Pre operative call day prior surgery scheduled in the AM w/ Dr Moriah Sharma, LEFT CHEST (Left Chest)----  Discussion/Review    Allergies ---Reviewed   Hold medications --- Reviewed as per hold list   NPO after MN, night prior surgery ---Reviewed  Medication (s) instructed by healthcare provider to take the morning of surgery w/ sip of water 4 OZ discussed:as per instructions form [facility]   Post operative scripts electronic transmission: cephalexin and acetaminophen suspension  PDMP site reviewed accessed and reviewed scheduled drug list printed and scanned into record--filed   Pain management script:acetaminophen    Pre- operative shower protocol reviewed; Clarify instructions as per protocol, third chlorhexidine shower tonight before surgery, then use LUBNA wipes as per packaging instructions, Use a clean towel and wash cloth starting tonight and continue nightly until seen 2 weeks post operative visit for incision check removal  Change bed linens tonight and continue at least 1-2 times weekly  --reviewed as per fax packet   Informed will receive a telephone call tonight from a hospital representative with time to report on surgery day: --reinforced   Informed will receive a f/u call within in 24 -48 hours post-op to assess recovery reinforce instructions, and to answer any questions  --reinforced     Follow-up appointments reviewed 2 week 1/2/2020  Patient/booker supervisor at [address] verbalized understanding information provided /discussed

## 2019-12-18 ENCOUNTER — ANESTHESIA (OUTPATIENT)
Dept: PERIOP | Facility: HOSPITAL | Age: 39
End: 2019-12-18
Payer: MEDICARE

## 2019-12-18 ENCOUNTER — HOSPITAL ENCOUNTER (OUTPATIENT)
Facility: HOSPITAL | Age: 39
Setting detail: OUTPATIENT SURGERY
Discharge: HOME | End: 2019-12-18
Attending: NEUROLOGICAL SURGERY | Admitting: NEUROLOGICAL SURGERY
Payer: MEDICARE

## 2019-12-18 VITALS
BODY MASS INDEX: 19.24 KG/M2 | WEIGHT: 98 LBS | SYSTOLIC BLOOD PRESSURE: 119 MMHG | RESPIRATION RATE: 18 BRPM | HEART RATE: 110 BPM | DIASTOLIC BLOOD PRESSURE: 77 MMHG | HEIGHT: 60 IN | OXYGEN SATURATION: 98 % | TEMPERATURE: 99 F

## 2019-12-18 PROCEDURE — C1767 GENERATOR, NEURO NON-RECHARG: HCPCS | Performed by: NEUROLOGICAL SURGERY

## 2019-12-18 PROCEDURE — 61885 INSRT/REDO NEUROSTIM 1 ARRAY: CPT | Performed by: NEUROLOGICAL SURGERY

## 2019-12-18 PROCEDURE — 95977 ALYS CPLX CN NPGT PRGRMG: CPT | Performed by: NEUROLOGICAL SURGERY

## 2019-12-18 DEVICE — IMPLANTABLE PULSE GENERATOR
Type: IMPLANTABLE DEVICE | Site: CHEST | Status: FUNCTIONAL
Brand: VNS THERAPY® SENTIVA™ MODEL 1000 GENERATOR

## 2019-12-18 RX ORDER — ONDANSETRON 2 MG/ML
4 INJECTION INTRAMUSCULAR; INTRAVENOUS ONCE AS NEEDED
Status: DISCONTINUED | OUTPATIENT
Start: 2019-12-18 | End: 2019-12-18 | Stop reason: HOSPADM

## 2019-12-18 RX ORDER — PROPOFOL 10 MG/ML
INJECTION, EMULSION INTRAVENOUS CONTINUOUS PRN
Status: DISCONTINUED | OUTPATIENT
Start: 2019-12-18 | End: 2019-12-18 | Stop reason: SURG

## 2019-12-18 RX ORDER — SODIUM CHLORIDE, SODIUM LACTATE, POTASSIUM CHLORIDE, CALCIUM CHLORIDE 600; 310; 30; 20 MG/100ML; MG/100ML; MG/100ML; MG/100ML
125 INJECTION, SOLUTION INTRAVENOUS CONTINUOUS
Status: DISCONTINUED | OUTPATIENT
Start: 2019-12-18 | End: 2019-12-18 | Stop reason: HOSPADM

## 2019-12-18 RX ORDER — LIDOCAINE HYDROCHLORIDE 10 MG/ML
0.5 INJECTION, SOLUTION EPIDURAL; INFILTRATION; INTRACAUDAL; PERINEURAL ONCE AS NEEDED
Status: DISCONTINUED | OUTPATIENT
Start: 2019-12-18 | End: 2019-12-18 | Stop reason: HOSPADM

## 2019-12-18 RX ORDER — LIDOCAINE HYDROCHLORIDE AND EPINEPHRINE 10; 10 MG/ML; UG/ML
INJECTION, SOLUTION INFILTRATION; PERINEURAL AS NEEDED
Status: DISCONTINUED | OUTPATIENT
Start: 2019-12-18 | End: 2019-12-18 | Stop reason: HOSPADM

## 2019-12-18 RX ORDER — CEFAZOLIN SODIUM 1 G/3ML
INJECTION, POWDER, FOR SOLUTION INTRAMUSCULAR; INTRAVENOUS AS NEEDED
Status: DISCONTINUED | OUTPATIENT
Start: 2019-12-18 | End: 2019-12-18 | Stop reason: SURG

## 2019-12-18 RX ORDER — FENTANYL CITRATE 50 UG/ML
25 INJECTION, SOLUTION INTRAMUSCULAR; INTRAVENOUS
Status: DISCONTINUED | OUTPATIENT
Start: 2019-12-18 | End: 2019-12-18 | Stop reason: HOSPADM

## 2019-12-18 RX ORDER — CHLORHEXIDINE GLUCONATE 0.12 MG/ML
15 RINSE ORAL ONCE
Status: DISCONTINUED | OUTPATIENT
Start: 2019-12-18 | End: 2019-12-18

## 2019-12-18 RX ORDER — PROPOFOL 10 MG/ML
INJECTION, EMULSION INTRAVENOUS AS NEEDED
Status: DISCONTINUED | OUTPATIENT
Start: 2019-12-18 | End: 2019-12-18 | Stop reason: SURG

## 2019-12-18 RX ADMIN — SODIUM CHLORIDE, SODIUM LACTATE, POTASSIUM CHLORIDE, AND CALCIUM CHLORIDE 125 ML/HR: .6; .31; .03; .02 INJECTION, SOLUTION INTRAVENOUS at 12:02

## 2019-12-18 RX ADMIN — PROPOFOL 50 MCG/KG/MIN: 10 INJECTION, EMULSION INTRAVENOUS at 12:56

## 2019-12-18 RX ADMIN — PROPOFOL 30 MG: 10 INJECTION, EMULSION INTRAVENOUS at 13:10

## 2019-12-18 RX ADMIN — CEFAZOLIN 1000 MG: 1 INJECTION, POWDER, FOR SOLUTION INTRAVENOUS at 12:54

## 2019-12-18 RX ADMIN — PROPOFOL 50 MG: 10 INJECTION, EMULSION INTRAVENOUS at 12:56

## 2019-12-18 NOTE — PERIOPERATIVE NURSING NOTE
As per dr Devon Napier md, patient's stimulator turned on intra-op and ok for patient to go to asc at this time  As per dr Nadia Santillan md, patient ok to be d/c'd to asc at this time  Will continue to monitor

## 2019-12-18 NOTE — OP NOTE
OPERATIVE REPORT  PATIENT NAME: Tessie Willis    :  1980  MRN: [id number]  Pt Location: BE OR ROOM 17    SURGERY DATE: 2019    Surgeon(s) and Role:     * Noam Boyce MD - Primary    Preop Diagnosis:  Lennox-Gastaut syndrome with tonic seizures ([address] ) [G40 812]  Intractable epilepsy without status epilepticus, unspecified epilepsy type ([address] ) [G40 919]  S/P placement of VNS (vagus nerve stimulation) device [Z96 89]    Post-Op Diagnosis Codes:     * Lennox-Gastaut syndrome with tonic seizures ([address] ) [G40 812]     * Intractable epilepsy without status epilepticus, unspecified epilepsy type ([address] ) [G40 919]     * S/P placement of VNS (vagus nerve stimulation) device [Z96 89]    Procedure(s) (LRB):  REPLACEMENT IMPLANTABLE PULSE GENERATOR FOR VAGAL NERVE STIMULATOR, LEFT CHEST (Left)    Specimen(s):  * No specimens in log *    Estimated Blood Loss:   Minimal    Drains:  Gastrostomy/Enterostomy Gastrostomy 18 Fr  LUQ (Active)   Dressing Status Clean;Dry; Intact 2019 12:03 PM   Dressing Type Dry dressing 2019 12:03 PM   Number of days: 27       Anesthesia Type:   IV Sedation with Anesthesia    Operative Indications:  Lennox-Gastaut syndrome with tonic seizures ([address] ) [G40 812]  Intractable epilepsy without status epilepticus, unspecified epilepsy type ([address] ) [G40 919]  S/P placement of VNS (vagus nerve stimulation) device [Z96 89]      Operative Findings:  See dictated note  Sentiva  [id number]      Complications:   None    Procedure and Technique:  The patient was taken to the operative theater and successfully induced under sedation  The patient was positioned supine  The patients prior incision was marked on the left chest  Then the patient was prepped and draped in sterile fashion, a timeout was performed  We began by making an incision along the old scar with a #10 Blade   We then used monopolar cautery to dissect down to the generator       We then removed the old generator and this was disconnected from the electrode using the proprietary screwdriver  Then the new generator was brought into the field  The new generator was reconnected with the screwdriver and then the impedances were tested and they were found to be within normal limits  Then we placed the new generator into the pocket and irrigated the wound  We then proceeded to close in layers with 2-0 Vicryl for the deeper tissues and 4-0 running monocryl for the skin       The patient was then was allowed to awaken from sedation and taken to the recovery area in stable condition  All needle and sponge counts were correct at the end of the procedure  I also performed intraoperative complex programing of the VNS generator by programing the following parameters: amplitude, frequency, pulse width, on time, off time and duty cycle, also with the magnet      Normal: 2mA, 20Hz, 250us, 21 sec, 0 8 min, 36%  Magnet: 2 25mA, 500us, 60 s    I was present for the entire procedure    Patient Disposition:  PACU     SIGNATURE: Luz Larios MD  DATE: December 18, 2019  TIME: 2:17 PM

## 2019-12-18 NOTE — DISCHARGE INSTRUCTIONS
Discharge Instructions  Battery (IPG) replacement    Activity:  1  Do not lift more than 10 pounds for 6 weeks  2  Avoid bending, lifting and twisting for 6 weeks  3  No strenuous activities  No driving for 2 weeks  4  When able to shower, continue to use clean towel and washcloth for 2 weeks post-op  5  Continue to change bed linens and pajamas more frequently  Wear clean clothes daily  Surgical incision care:  1  Keep dressing in place for 3 days  2  Keep incision dry for 3 days  3  May shower with mild antimicrobial soap after 3 days  4  After 3 days, incisions may be left open to air, but must remain clean  5  Do not immerse the incisions in water for 6 weeks  6  Do not apply any creams or ointments to the incision for 6 weeks, unless otherwise instructed by [facility]  7  Contact office if increasing redness, drainage, pain or swelling around the incisions  Postoperative medication:  1  Complete course of antibiotic as directed  2  [facility] will provide pain medication as coordinated with your pain specialist  All prescriptions must come from a single provider  a  Take all medications as prescribed  Call office with any questions/concerns  3  Please contact office for questions regarding dosage and modifications  4  No antiplatelet, anticoagulation or Nonsteroidal anti-inflammatory (NSAIDs) medication until cleared by [employer], unless otherwise instructed  5  Do not operate heavy machinery or vehicles while taking sedating medications  6  Use a bowel regimen while on opioids as they induce constipation  Ie  Senokot-S, Miralax, Colace, etc  Increase fiber and water intake

## 2019-12-18 NOTE — ANESTHESIA POSTPROCEDURE EVALUATION
Post-Op Assessment Note    CV Status:  Stable  Pain Score: 0    Pain management: adequate     Mental Status:  Alert and awake (back to baseline )   Hydration Status:  Euvolemic   PONV Controlled:  Controlled   Airway Patency:  Patent   Post Op Vitals Reviewed: Yes      Staff: CRNA           BP (P) 101/61 (12/18/19 1331)    Temp (!) (P) 97 1 °F (36 2 °C) (12/18/19 1331)    Pulse (P) 99 (12/18/19 1331)   Resp (P) 18 (12/18/19 1331)    SpO2   98%

## 2019-12-18 NOTE — INTERVAL H&P NOTE
H&P reviewed  After examining the patient I find no changes in the patients condition since the H&P had been written      Vitals:    12/18/19 1047   BP: 103/72   Pulse: 78   Resp: 20   Temp: 97 9 °F (36 6 °C)   SpO2: 98%     Generator end of life OR for replacement of left generator

## 2019-12-19 ENCOUNTER — TELEPHONE (OUTPATIENT)
Dept: NEUROSURGERY | Facility: CLINIC | Age: 39
End: 2019-12-19

## 2019-12-19 NOTE — TELEPHONE ENCOUNTER
[address] to follow up on patient POD 1  Caregiver reports that she is doing very well overall and denies any incisional issues or fevers  Patient denies any bleeding, dizziness, fever, redness, significant pain and swelling  Verified date/time/location of her upcoming POV on 1/2/20 and advised her to call the office with any further questions or concerns, or if any incisional issues or fevers would arise  Caregiver received and does understand the discharge instructions  Reviewed incision care with Caregiver and she has no further questions at this time  Caregiver was appreciative for the call

## 2020-01-02 ENCOUNTER — OFFICE VISIT (OUTPATIENT)
Dept: NEUROSURGERY | Facility: CLINIC | Age: 40
End: 2020-01-02

## 2020-01-02 VITALS
HEART RATE: 82 BPM | WEIGHT: 95 LBS | BODY MASS INDEX: 18.65 KG/M2 | RESPIRATION RATE: 16 BRPM | DIASTOLIC BLOOD PRESSURE: 64 MMHG | HEIGHT: 60 IN | SYSTOLIC BLOOD PRESSURE: 102 MMHG | TEMPERATURE: 98.3 F

## 2020-01-02 DIAGNOSIS — G93.1 CEREBRAL ANOXIC INJURY (HCC): ICD-10-CM

## 2020-01-02 DIAGNOSIS — G40.812 LENNOX-GASTAUT SYNDROME WITH TONIC SEIZURES (HCC): Primary | ICD-10-CM

## 2020-01-02 DIAGNOSIS — Z48.89 AFTERCARE FOLLOWING SURGERY: ICD-10-CM

## 2020-01-02 DIAGNOSIS — G40.814 INTRACTABLE LENNOX-GASTAUT SYNDROME WITHOUT STATUS EPILEPTICUS (HCC): ICD-10-CM

## 2020-01-02 DIAGNOSIS — F79 INTELLECTUAL DISABILITY WITH EPILEPSY (HCC): ICD-10-CM

## 2020-01-02 DIAGNOSIS — F90.0 ADHD, PREDOMINANTLY INATTENTIVE TYPE: ICD-10-CM

## 2020-01-02 DIAGNOSIS — G40.909 INTELLECTUAL DISABILITY WITH EPILEPSY (HCC): ICD-10-CM

## 2020-01-02 PROCEDURE — 99024 POSTOP FOLLOW-UP VISIT: CPT | Performed by: NURSE PRACTITIONER

## 2020-01-02 NOTE — PROGRESS NOTES
Assessment/Plan:  Diagnoses and all orders for this visit:    Lennox-Gastaut syndrome with tonic seizures ([facility] )    Intellectual disability with epilepsy ([facility] )    Cerebral anoxic injury (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  [address] STIMULATOR, LEFT CHEST (Left Chest)  She has a longstanding history of  intractable epilepsy/Lennox Gastuat syndrome,  anoxic encephalopathy, status epilepticus several times, and cognitive impairment  She is treating with VNS and AED consisting of Brivaracetam, Keppra, topiramate, and cannabidiol  AED medications are causing somnolence  A review of Dr Radha Ahumada notes indicated almost continued daily seizures tonic during sleep  She consulted with Neurosurgery 12/10/2019 for replacement of VNS generator nearing EOL  Assessment/Plan;  As outlined above in HPI    Left upper chest incision closure  with Monocryl dissolvable sutures that remains intact  The incision is  clean, dry and intact, without erythema, dehiscence, drainage or s/s of infection, well healed with well approximated wound edges  Cut knot on lateral end of dissolvable sutures, she tolerated procedure well  The following instructions are reviewed in documentation returned to facility and continue thru next 4 weeks (6 week post op)  At 2 weeks postop can resume restricted medications such as ASA, products containing ASA, NSAID, fish oils, and OTC products or as previously directed    Continue to observe incisions for redness, drainage, swelling dehiscence, increased pain, fever >/= 101, warmth to touch incision or skin surrounding, if occur call or report to office immediately for reassessment  Continue showers using clean towel and wash cloth with OTC antibacterial body wash for an additional 2 weeks  Monocryl sutures should dissolve within 6 weeks but can take the maximum of 90 days as per manufacture  Do not apply lotions, creams, powder, or ointments to surgical incisions  Activity as tolerated,  Avoid excess use of arm on operative side avoid repetitive pushing , pulling,  repetitive rotating arm movement, and refrain from  lifting greater than 10 lbs or extreme stretching  No Immersion of incision in water such as swimming, hot tub, or tub bath  If  there is any significant change in her neurologic status, call and/or return to the office immediately for reassessment  Please schedule f/u appointment with Dr Sergio Kenney ASAP postoperative generator interrogation      Impressions and treatment recommendations were discussed in detail in documented postoperative paperwork  Contact information was given in the event future questions arise    The following portions of the patient's history were reviewed and updated as appropriate:   She  has a past medical history of ADHD, Anoxic brain damage ([facility] ), Autistic disorder, Breakthrough seizure ([facility] ) (8/1/2019), Hyperammonemia (HCC), Hyperkeratosis, Hypotension, Intellectual disability, Lennox-Gastaut syndrome with tonic seizures (HCC), Lethargy, Liver enzyme elevation, Onychomycosis, Osteoporosis, and Osteoporosis    She   Patient Active Problem List    Diagnosis Date Noted    Aftercare following surgery 01/02/2020    Preoperative examination 12/10/2019    Intellectual disability with epilepsy ([facility] ) 12/10/2019    Acute cystitis with hematuria 08/02/2019    Fatty infiltration of liver 08/02/2019    Protein calorie malnutrition ([facility] ) 07/31/2019    Dislodged gastrostomy tube ([facility] ) 07/31/2019    S/P placement of VNS (vagus nerve stimulation) device 03/18/2019    Breast mass 13/98/8093    Metabolic acidosis, increased anion gap (IAG) 01/14/2019    Sedated due to multiple medications 01/10/2019    Hypophosphatemia 12/18/2018    Right atrial enlargement 10/13/2018    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    Hypoalbuminemia 10/10/2018    Skin breakdown 10/09/2018    Incontinence 08/23/2018    Somnolence 08/18/2018    Transaminitis 08/18/2018    Intractable epilepsy without status epilepticus ([facility] ) 04/23/2018    Hyperammonemia ([facility] ) 11/23/2017    Underweight 11/22/2017    Intellectual disability 11/22/2017    Lennox-[facility]astaut [facility] 07/06/2017    Osteoporosis 09/17/2013    Onychomycosis 09/17/2013    Hyperkeratosis 09/17/2013    C[facility]bral an[facility] injury [facility] 09/17/2013    ADHD, 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     She  has a past surgical history that includes Jejunostomy feeding tube; PEG tube placement; Abdominal surgery; IR thoracentesis (12/17/2018); Cardiac pacemaker placement; IR PEG/GJ tube placement (11/21/2019); and pr imp stim,cranial,subq,1 array (Left, 12/18/2019)  Her family history is not on file  She  reports that she has never smoked  She has never used smokeless tobacco  She reports that she drank alcohol  She reports that she does not use drugs    Current Outpatient Medications   Medication Sig Dispense Refill    acetaminophen (TYLENOL) 160 mg/5 mL suspension Take 20 9 mL (668 8 mg total) by mouth every 6 (six) hours as needed for mild pain Start 12/18 after surgery 118 mL 0    bisacodyl (BISCOLAX) 10 mg suppository Insert 10 mg into the rectum daily at bedtime as needed for constipation (if no BM day #4)      bisacodyl (FLEET BISACODYL) 10 MG/30ML ENEM Insert 10 mg into the rectum as needed for constipation Give at hs on day 5 if suppository is not effective      Brivaracetam (BRIVIACT) 10 MG/ML SOLN 5 mL (50 mg total) by Per PEG Tube route every 12 (twelve) hours 300 mL 5    cannabidiol (EPIDIOLEX) 100 mg/ml SOLN 4 mL (400 mg total) by Per G Tube route 2 (two) times a day 240 mL 5    cloBAZam (ONFI) 10 MG tablet Give half a tab at bedtime (Patient taking differently: by Per G Tube route Give half a tab at bedtime) 15 tablet 5    diazepam (VALIUM) 5 MG/ML solution Give 1mL by PEG tube PRN generalized tonic clonic seizure longer than 5 minutes or continuous multiple tonic seizures over 30 minutes 30 mL 0    levETIRAcetam (KEPPRA) 100 mg/mL oral solution 10 mL (1,000 mg total) by Per G Tube route every 12 (twelve) hours 600 mL 5    magnesium hydroxide (MILK OF MAGNESIA) 400 mg/5 mL oral suspension Take 30 mL by mouth daily as needed for constipation If not BM by day 3      MELATONIN PO 6 mg by Per G Tube route daily at bedtime      Multiple Vitamins-Minerals (MULTIVITAMIN WITH IRON-MINERALS) liquid 5 mL by Per G Tube route daily      Probiotic Product (ALEXANDER-BID PROBIOTIC PO) 1 tablet by Per G Tube route daily        rufinamide (BANZEL) 400 mg tablet 4 tablets (1,600 mg total) by Per G Tube route every 12 (twelve) hours 240 tablet 5    topiramate (TOPAMAX) 50 MG tablet 5 tablets (250 mg total) by Per G Tube route every 12 (twelve) hours 300 tablet 5     No current facility-administered medications for this visit  She is allergic to felbamate       Review of Systems   Unable to perform ROS: Patient nonverbal         Objective:      /64 (BP Location: Right arm, Patient Position: Sitting, Cuff Size: Adult)   Pulse 82   Temp 98 3 °F (36 8 °C) (Tympanic)   Resp 16   Ht 5' (1 524 m)   Wt 43 1 kg (95 lb) Comment: Per Nursing home record  BMI 18 55 kg/m²          Physical Exam   HENT:   Head: Normocephalic and atraumatic  Eyes: EOM are normal  Right eye exhibits no discharge  Left eye exhibits no discharge  No scleral icterus  Musculoskeletal: She exhibits deformity (flexion contractors extremities )  Neurological: She is alert  Skin: Skin is warm and dry  No erythema   No pallor  Psychiatric:   Non verbal Intellectual disability with epilepsy    Nursing note and vitals reviewed        Neurologic Exam     Cranial Nerves     CN III, IV, VI   Extraocular motions are normal      Motor Exam   Muscle bulk: normal  Overall muscle tone: increased  Right arm tone: increased  Left arm tone: increased  Right leg tone: increased  Left leg tone: increased    Gait, Coordination, and Reflexes     Gait  Gait: (non ambulator mechanical device for mobility liter or w/c )

## 2020-03-05 NOTE — PROGRESS NOTES
Progress Note - Michael Cabello 1980, 41 y o  female MRN: [id number]    Unit/Bed#:  Encounter: [id number]    Primary Care Provider: Wilma Aparicio DO   Date and time admitted to hospital: 12/14/2018  5:44 AM        * Severe sepsis with septic shock (CODE) ([facility] [address] )   Assessment & Plan    · Severe sepsis (present on admission) secondary to possible aspiration pneumonia versus healthcare-associated pneumonia/possible gram-negative pneumonia  · The patient has been successfully weaned off norepinephrine  · Continue IV aztreonam, IV vancomycin, and IV metronidazole  · Follow culture results  · Follow the procalcitonin level         Seizure disorder [facility])   Assessment & Plan    · Anti-epileptic drug regimen was reviewed in detail between Dr Krishna Milan and the patient's primary epileptologist on 12/17/2018  · The patient's phenobarbital was decreased to hopefully improve her mentation in anticipation of possible weaning of the ventilator  · Per Neurology, the Perampanel tablet 8 mg could be increased to 10 to 12 mg if needed for any evidence of seizure activity  · Check an EEG  · [facility] Neurology today, 12/18/2018     Aspiration pneumonia (HCC)   Assessment & Plan    · Versus possible healthcare-associated pneumonia/possible gram-negative pneumonia  · Continue IV aztreonam, IV vancomycin, and IV metronidazole  · Follow culture results  · Follow the procalcitonin level     Diarrhea   Assessment & Plan    · Discontinue lactulose with a normal ammonia level     Hypophosphatemia   Assessment & Plan    · Replete with IV potassium phosphate  · Follow the phosphorus level     Pleural effusion   Assessment & Plan    · S/P thoracentesis by Interventional Radiology on 12/17/2018  · Follow the pleural fluid analysis results and pleural fluid culture results  · Recheck a portable chest x-ray today, 12/18/2018     Transaminitis   Assessment & Plan    · Likely medication effect versus shock liver with the patient having severe sepsis with septic shock  · Follow the LFT's (liver function tests)  · Avoid all hepatotoxic agents     Hypernatremia   Assessment & Plan    · Increase free water flushes to 200 mL every 6 hours via PEG tube  · Follow the sodium level  · Discontinue IV fluids     Acute respiratory failure with hypoxia (HCC)   Assessment & Plan    · Continue the current ventilator settings  · Possible subclinical seizures  · S/P bronchoscopy on 12/17/2018  · Recheck a portable chest x-ray today, 12/18/2018  · I appreciate Critical Care Medicine's input         Hypokalemia   Assessment & Plan    · Replete with IV potassium chloride  · Follow the potassium level and magnesium level     Hyperammonemia (HCC)   Assessment & Plan    · Likely medication effect of her seizure medication regimen  · The patient currently has a normal ammonia level  · Discontinue lactulose with the diarrhea     Lactic acidosis   Assessment & Plan    · Follow the lactic acid level     Lennox-Gastaut syndrome with tonic seizures (HCC)   Assessment & Plan    · Anti-epileptic drug regimen was reviewed in detail between Dr Catalina Salvador and the patient's primary epileptologist on 12/17/2018  · The patient's phenobarbital was decreased to hopefully improve her mentation in anticipation of possible weaning of the ventilator  · Per Neurology, the Perampanel tablet 8 mg could be increased to 10 to 12 mg if needed for any evidence of seizure activity  · Check an EEG  · Consult Tele Neurology today, 12/18/2018         VTE Pharmacologic Prophylaxis:   Pharmacologic: Enoxaparin (Lovenox)  Mechanical VTE Prophylaxis in Place: Yes    Patient Centered Rounds: I have performed bedside rounds with nursing staff today  Time Spent for Care: 45 minutes  More than 50% of total time spent on counseling and coordination of care as described above  Total critical care time spent was 45 minutes      Current Length of Stay: 4 day(s)    Current Patient Status: Inpatient   Certification Statement: The patient will continue to require additional inpatient hospital stay due to the need for mechanical ventilation and for IV antibiotics  Code Status: Level 1 - Full Code      Subjective: The patient was seen and examined  The patient is currently awake on the mechanical ventilator  She is not currently following any commands  Objective:     Vitals:   Temp (24hrs), Av 6 °F (35 9 °C), Min:95 5 °F (35 3 °C), Max:97 2 °F (36 2 °C)    Temp:  [95 5 °F (35 3 °C)-97 2 °F (36 2 °C)] 97 2 °F (36 2 °C)  HR:  [] 77  Resp:  [] 18  BP: ()/(59-71) 94/68  SpO2:  [91 %-100 %] 92 %  Body mass index is 25 83 kg/m²  Input and Output Summary (last 24 hours):        Intake/Output Summary (Last 24 hours) at 18 0930  Last data filed at 18 6029   Gross per 24 hour   Intake          3963 12 ml   Output             3350 ml   Net           613 12 ml       Physical Exam:     Physical Exam  General:  NAD, awake, intubated on the mechanical ventilator, not following commands  HEENT:  NC/AT, mucous membranes dry, ET tube in place  Neck:  Supple, No JVP elevation  CV:  + S1, + S2, RRR  Pulm:  Bibasilar crackles  Abd:  Soft, Non-tender, Non-distended  Ext:  No clubbing/cyanosis/edema  Skin:  + Facial rash      Additional Data:     Labs:      Results from last 7 days  Lab Units 18  0502 18  0528   WBC Thousand/uL 14 51* 20 37*   HEMOGLOBIN g/dL 9 3* 9 4*   HEMATOCRIT % 28 2* 28 3*   PLATELETS Thousands/uL 222 224   BANDS PCT % 1  --    NEUTROS PCT %  --  83*   LYMPHS PCT %  --  9*   LYMPHO PCT % 16  --    MONOS PCT %  --  6   MONO PCT % 5  --    EOS PCT % 0 0       Results from last 7 days  Lab Units 18  0502   SODIUM mmol/L 146*   POTASSIUM mmol/L 2 7*   CHLORIDE mmol/L 114*   CO2 mmol/L 21   BUN mg/dL 8   CREATININE mg/dL 0 39*   ANION GAP mmol/L 11   CALCIUM mg/dL 7 3*   ALBUMIN g/dL 1 5*   TOTAL BILIRUBIN mg/dL 0 40   ALK PHOS U/L 149*   ALT U/L 68   AST U/L 76*   GLUCOSE RANDOM mg/dL 181*       Results from last 7 days  Lab Units 12/18/18  0502   INR  1 46*       Results from last 7 days  Lab Units 12/18/18  0628 12/18/18  0018 12/17/18  1300   POC GLUCOSE mg/dl 162* 211* 232*           Results from last 7 days  Lab Units 12/17/18  0528 12/17/18  0232 12/17/18  0124 12/16/18  2259  12/16/18  8330  12/15/18  1210  12/14/18  2336  12/14/18  1231   LACTIC ACID mmol/L 3 1* 4 3* 4 3* 3 8*  < > 2 6*  < > 3 9*  < > 6 0*  < > 3 2*   PROCALCITONIN ng/ml 3 32*  --   --   --   --  4 21*  --  4 75*  --  5 34*  --  3 29*   < > = values in this interval not displayed  * I Have Reviewed All Lab Data Listed Above  * Additional Pertinent Lab Tests Reviewed: Maddie [address] Admission Reviewed        Recent Cultures (last 7 days):       Results from last 7 days  Lab Units 12/17/18  1607 12/17/18  1430 12/17/18  1138 12/14/18  0749 12/14/18  0718 12/14/18  0659   BLOOD CULTURE   --   --   --   --  No Growth at 72 hrs  No Growth at 72 hrs     GRAM STAIN RESULT  2+ Polys  No bacteria seen 3+ Polys  No bacteria seen 3+ Polys  Rare Gram positive cocci in pairs  --   --   --    INFLUENZA B PCR   --   --   --  None Detected  --   --    RSV PCR   --   --   --  None Detected  --   --        Last 24 Hours Medication List:     Current Facility-Administered Medications:  albuterol 2 5 mg Nebulization Q4H PRN Klever Wong MD    artificial tear  Both Eyes 4x Daily KATHY Sheikh    aztreonam 1,000 mg Intravenous Radha Connolly DO Last Rate: 1,000 mg (12/18/18 0520)   chlorhexidine 15 mL [facility] Yaw Folds, DO    dexmedetomidine (PRECEDEX) 200 mcg in 50 ml sodium chloride 0 9% 0 1-1 4 mcg/kg/hr Intravenous Titrated WAGNER Oro Rate: Stopped (12/18/18 0800)   dextrose 25 mL Intravenous PRN Christopher Edwards, DO    enoxaparin 40 mg Subcutaneous Q24H [address] Tristan Harvey, DO    insulin lispro 1-5 Units Subcutaneous Q6H [address] Kamryn Ventura DO    levalbuterol 1 25 mg Nebulization TID Natalia Murphy MD    levETIRAcetam 1,000 mg Per G Tube Q12H [facility], DO    levOCARNitine 750 mg Per G Tube TID [facility], DO    metroNIDAZOLE 500 mg Intravenous [facility] Edi Vazquez, DO Last Rate: 500 mg (12/18/18 0521)   norepinephrine 1-30 mcg/min Intravenous Titrated Kamryn Ventura DO Last Rate: Stopped (12/17/18 0319)   ondansetron 4 mg Intravenous Q4H [facility]N [facility], DO    Perampanel 8 mg Per NG Tube HS [facility], DO    PHENobarbital 40 mg Oral HS [facility], DO    potassium chloride 40 mEq Intravenous Once Harmony Y Swank, CRNP    potassium phosphate 12 mmol Intravenous Once Kamryn Ventura DO    rufinamide 1,200 mg Per G Tube BID Natalia Murphy MD    sodium chloride 3 mL Nebulization TID Natalia Murphy MD    topiramate 200 mg Oral Q12H [facility], DO    valproic acid 250 mg Per G Tube Q8H [facility], DO    vancomycin 1,750 mg Intravenous Q12H Natalia Murphy MD Last Rate: 1,750 mg (12/18/18 0510)     Facility-Administered Medications Ordered in Other Encounters:  propofol  Intravenous PRN Anh Odom CRNA   vecuronium   PRN Anh Ilene, CRNA        Today, Patient Was Seen By: Kamryn Ventura DO    ** Please Note: Dictation voice to text software may have been used in the creation of this document   ** Unclear etiology at this point. Pt with NICM. There is a positional and pleuritic component to pain as per patient. Trop negative.  -Cardiology consult in AM

## 2020-03-10 ENCOUNTER — OFFICE VISIT (OUTPATIENT)
Dept: NEUROLOGY | Facility: CLINIC | Age: 40
End: 2020-03-10
Payer: MEDICARE

## 2020-03-10 VITALS — SYSTOLIC BLOOD PRESSURE: 93 MMHG | DIASTOLIC BLOOD PRESSURE: 56 MMHG | HEART RATE: 88 BPM

## 2020-03-10 DIAGNOSIS — R63.6 UNDERWEIGHT: ICD-10-CM

## 2020-03-10 DIAGNOSIS — G40.909 INTELLECTUAL DISABILITY WITH EPILEPSY (HCC): ICD-10-CM

## 2020-03-10 DIAGNOSIS — E46 PROTEIN-CALORIE MALNUTRITION, UNSPECIFIED SEVERITY (HCC): ICD-10-CM

## 2020-03-10 DIAGNOSIS — G40.812 LENNOX-GASTAUT SYNDROME WITH TONIC SEIZURES (HCC): ICD-10-CM

## 2020-03-10 DIAGNOSIS — G40.814 INTRACTABLE LENNOX-GASTAUT SYNDROME WITHOUT STATUS EPILEPTICUS (HCC): Primary | ICD-10-CM

## 2020-03-10 DIAGNOSIS — G40.813 INTRACTABLE LENNOX-GASTAUT SYNDROME WITH STATUS EPILEPTICUS (HCC): ICD-10-CM

## 2020-03-10 DIAGNOSIS — F79 INTELLECTUAL DISABILITY WITH EPILEPSY (HCC): ICD-10-CM

## 2020-03-10 DIAGNOSIS — R40.0 SOMNOLENCE: ICD-10-CM

## 2020-03-10 PROBLEM — R74.01 TRANSAMINITIS: Status: RESOLVED | Noted: 2018-08-18 | Resolved: 2020-03-10

## 2020-03-10 PROBLEM — E87.2 METABOLIC ACIDOSIS, INCREASED ANION GAP (IAG): Status: RESOLVED | Noted: 2019-01-14 | Resolved: 2020-03-10

## 2020-03-10 PROBLEM — E88.09 HYPOALBUMINEMIA: Status: RESOLVED | Noted: 2018-10-10 | Resolved: 2020-03-10

## 2020-03-10 PROBLEM — E87.29 METABOLIC ACIDOSIS, INCREASED ANION GAP (IAG): Status: RESOLVED | Noted: 2019-01-14 | Resolved: 2020-03-10

## 2020-03-10 PROBLEM — G40.911 INTRACTABLE EPILEPSY WITH STATUS EPILEPTICUS (HCC): Status: ACTIVE | Noted: 2018-04-23

## 2020-03-10 PROBLEM — E72.20 HYPERAMMONEMIA (HCC): Status: RESOLVED | Noted: 2017-11-23 | Resolved: 2020-03-10

## 2020-03-10 PROBLEM — N90.7 LABIAL CYST: Status: ACTIVE | Noted: 2017-09-13

## 2020-03-10 PROBLEM — H81.90 LABYRINTHINE DISORDER: Status: ACTIVE | Noted: 2020-03-10

## 2020-03-10 PROCEDURE — 95977 ALYS CPLX CN NPGT PRGRMG: CPT | Performed by: PSYCHIATRY & NEUROLOGY

## 2020-03-10 PROCEDURE — 99214 OFFICE O/P EST MOD 30 MIN: CPT | Performed by: PSYCHIATRY & NEUROLOGY

## 2020-03-10 RX ORDER — CLOBAZAM 10 MG/1
5 TABLET ORAL
Qty: 15 TABLET | Refills: 5 | Status: SHIPPED | OUTPATIENT
Start: 2020-03-10 | End: 2020-05-27

## 2020-03-10 RX ORDER — RUFINAMIDE 400 MG/1
1600 TABLET, FILM COATED ORAL EVERY 12 HOURS
Qty: 240 TABLET | Refills: 5 | Status: SHIPPED | OUTPATIENT
Start: 2020-03-10 | End: 2020-10-13 | Stop reason: SDUPTHER

## 2020-03-10 RX ORDER — LEVETIRACETAM 100 MG/ML
500 SOLUTION ORAL EVERY 12 HOURS
Qty: 300 ML | Refills: 0 | Status: SHIPPED | OUTPATIENT
Start: 2020-03-10 | End: 2020-06-16

## 2020-03-10 RX ORDER — TOPIRAMATE 50 MG/1
250 TABLET, FILM COATED ORAL EVERY 12 HOURS SCHEDULED
Qty: 300 TABLET | Refills: 5 | Status: SHIPPED | OUTPATIENT
Start: 2020-03-10 | End: 2020-10-13 | Stop reason: SDUPTHER

## 2020-03-10 NOTE — PROGRESS NOTES
[facility]  Patient's Name: Darylene Row   Patient's : 1980   Visit Type: follow-up  Referring MD / PCP:  Letty Michelle MD     Assessment:  Ms Dai Randhawa is a 46 y o  woman with Idamae Blase Syndrome, remote history of anoxic brain injury (however her MRI brain study is generally a normal MRI brain), multiple admissions in 2019 for status epilepticus (however, her EEG studies consistently shows extremely frequent tonic seizures during periods of sleep), severe intellectual disability, and frequent medication adverse effects  Her EEG has features of both generalized and focal epilepsy; her focal seizures seem to occur from the bilateral temporal regions with rhythmic discharges, but no apparent clinical symptoms  Despite being on 5-6 antiepileptic medications, she continues to have tonic seizures during sleep  Her seizures during periods of wakefulness seems to fluctuate over the year, sometimes there these are reported, but it is possible that as she becomes more encephalopathic from her epilepsy (she is not monitored as frequently as she was in the past for behavioral management), seizures are not being witnessed  She has a new VNS generator that has Auto Stimulation feature, to use the Auto Stimulation feature, I have to decrease the cycling rate of the VNS (changed off time to 1 1 minute)  This may provide more stimulation  She tolerated the adjustment of the VNS without signs of pain, coughing, or hoarseness  We will proceed with transitioning her from Levetiracetam to Briviact  Based on her weight, she is at the maximal dose of Epidiolex  Paper scripts were provided to her [address]  There seems to be further weight loss since the last time I saw her in 2019  I recommend that she is seen by a nutritional specialist to review her tube feeds and if a higher protein content can be provided    Low glycemic index diet is sometimes used to decrease the frequency of seizures  Plan:   Medications are given by PEG  1 - decrease Levetiracetam 100mg/mL to give 5mL every 12 hours for 30 days then stop  2 - increase Briviact 10mg/mL to 10mL (100mg) every 12 hours  3 - continue with Topiramate 250mg twice a day  4 - continue Banzel 400mg give 4 tabs twice a day  5 - continue Epidiolex 100mg/mL 4 mL (400mg) twice a day (Goes to [facility] specialty pharmacy)  6 - continue Onfi 5mg at bedtime  7 - may use diazepam 5mg/mL oral solution, give 1mL by peg tube as needed for generalized tonic clonic seizure lasting more than 5 minutes  8 - please have dietary / nutritional consultation to evaluate patient's protein calorie malnutrition and weight loss  Please have nutritional consultation consider giving tube feeds that are high in protein, relatively low in glucose (Low glycemic Index diet), low glycemic index diet has been used to decrease frequency of seizures in intractable epilepsy    9 - follow-up in 3 months    Problem List Items Addressed This Visit        Nervous and Auditory    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HCC)    Relevant Medications    Brivaracetam (Briviact) 10 MG/ML SOLN    levETIRAcetam (KEPPRA) 100 mg/mL oral solution    topiramate (TOPAMAX) 50 MG tablet    rufinamide (BANZEL) 400 mg tablet    cloBAZam (ONFI) 10 MG tablet    cannabidiol (Epidiolex) 100 mg/ml SOLN    Intractable epilepsy with status epilepticus (HCC) - Primary    Relevant Medications    Brivaracetam (Briviact) 10 MG/ML SOLN    levETIRAcetam (KEPPRA) 100 mg/mL oral solution    topiramate (TOPAMAX) 50 MG tablet    rufinamide (BANZEL) 400 mg tablet    cloBAZam (ONFI) 10 MG tablet    cannabidiol (Epidiolex) 100 mg/ml SOLN    Intellectual disability with epilepsy (HCC)    Relevant Medications    Brivaracetam (Briviact) 10 MG/ML SOLN    levETIRAcetam (KEPPRA) 100 mg/mL oral solution    topiramate (TOPAMAX) 50 MG tablet    rufinamide (BANZEL) 400 mg tablet    cloBAZam (ONFI) 10 MG tablet cannabidiol (Epidiolex) 100 mg/ml SOLN       Other    Underweight    Somnolence    Protein calorie malnutrition (HCC)      Other Visit Diagnoses     Lennox-Gastaut syndrome with tonic seizures (HCC)        Relevant Medications    Brivaracetam (Briviact) 10 MG/ML SOLN    levETIRAcetam (KEPPRA) 100 mg/mL oral solution    topiramate (TOPAMAX) 50 MG tablet    rufinamide (BANZEL) 400 mg tablet    cloBAZam (ONFI) 10 MG tablet    cannabidiol (Epidiolex) 100 mg/ml SOLN        Chief Complaint:    Chief Complaint     Seizures        HPI:    Darylene Row is a 46 y o  unknown handed female here for follow-up evaluation of intractable epilepsy in the setting of LGS  Interval History 3/10/2020  She had a replacement of her VNS in December 2019, [facility] [address] [facility]  I seems that she has had a decrease in weight by at least 10 pounds  I spoke the Nahid Stanley  unit nurse  The nurse had to review the patient's chart to find one reported seizure  There have been there was a seizure on 12/30/2019, she had a temperature 100 3F, 3 seizures lasted about 15 seconds each  She had nasal drainage  There has been no improvement in degree of wakefulness, nonverbal, calls out at times  She does not do much  AED/side effects/compliance:  Banzel 400mg give 4 tabs twice a day   Topiramate 250mg tab twice a day   Levetiracetam 100mg/mL give 10mL q8hrs  Epidiolex 400mg twice a day  Briviact 10mg/mL 5mL twice a day  Onfi 5mg at bedtime    Event/Seizure semiology:  Seizure semiology:  1  She was described to have drop attacks or spells with grunting sounds and falling to the floor  2  Her nurse commented on episodes of spasms or myoclonic jerking of the right arm  3  Generalized convulsions  4   Tonic seizures of eyes rolling back (mostly during sleep)    Prior Epilepsy History:  Collective epilepsy history 11/6/2014 was previously evaluated by Dr Serina Dailey including a hospitalization in February 2013 for status epilepticus, in the setting of missed doses of AEDs due to refusal to eat  She subsequently required anesthetic induced coma to stop her seizures and PEG tube was placed  She was seen almost every month for management of her seizures up until November 2013  She has medically refractory seizures and had a VNS placed possibly in the early 2000s and a generator changed in 2011  Unknown AEDs that were tried but felbamate is listed as an allergy  In 2011, her AEDs were clonazepam, levetiracetam, Depakote and Lamictal  Dr Serina Dailey had started [facility] in 2011  In 2012, Jeffrey Duncan was added with the reduction of clonazepam  There were difficulty with documenting seizure frequency; but seizures were no longer daily occurrences but there were clusters of seizures  There would be good periods and bad periods of seizure frequency  Dr Serina Dailey had been increasing the duty cycle of the VNS over the course of 2012 to 2013  Sometime between August 2013 and October 2013, topiramate was introduced  She was in status epilepticus in 2013, she was on Keppra, Banzel, Onfi, and Lamictal  She had an EEG at some point that showed multifocal spike-wave and background attenuation  She has intermittent agitation and day time somnolence  When she was hospitalized for status epilepticus, she was started on methylphenidate to help wake her up  Intake history November 2014:  VPA level 127  Her Valproic Acid dose was previously 250mg q6hrs based on Dr Michelle Holloway notes  Since July 2014, she has been receiving VPA 500mg q6hrs  She has not been hospitalized since September 2013  She was previously ambulatory with therapy  She spends most of her time sleeping for the past couple of months  She has also been receiving lactulose for elevated ammonia levels  (older drugs VPA, LVT, LMG, newer drugs added on since 2011 RFN, Onfi, TPM)     Summary of 2015:   We decreased VPA from TDD 2000mg to 1000mg with increased agitation/combativeness, alternating days of exhaustion (no behavioral specialist has been involved)  are randomly reported by multiple staff members  Seizures are typically reported by CNA's or her one to one on the 3PM to 11PM shift  Seizures are described a brief events of eyes rolling back and arms going up in the air, followed by hair pulling or slapping herself  These seizures were reported as either sporadic or every other day episodes  There have been no documented grand mal seizures or drop attacks  She refuses to wear safety helmet, rips at her hair, and attempts to flip herself out of her chair and bed  Gary Chance can walk briefly but walks on her toes, she frequently will allow herself to fall down when she does not want to walk  It does not appear that methylphenidate has been useful in maintaining her level of wakefulness during the day  VNS generator change on 11/3/2015  The Model 103 (serial [id number]) was replaced with [employer] Model 105, serial [id number]  Weaned off of levetiracetam due to multiple medications and agitation; by the end of 2016, she was down to -500  Summary of 2016:  Started with RFN 7538-3832, -250, CLB 0 5-0 5-25,  QID, -200 attempted to wean off of LEV and reduced the dose of VPA given that there were no reports of seizures and she was sedated, along with elevated ammonia levels  It was unclear as to how effective LEV was for her seizure control  When she missed a dose of TPM in September 2016, she had multiple seizures  We attempted to compensate for increase in seizures by increasing Onfi to 20-20; however, it seems that it made her more sedated  Summary of 2017  RFN [id number], -250, Onfi 20-20, -200,  q8hrs  She has been more somnolent  She continues to have tonic seizures when she is asleep, eyes rolling body, arms will tense up with some twitching, last a few seconds, but will recur  There are no seizures during the daytime    We attempted to wean off of VPA due to ammonia / somnolence; but there was an increase in tonic seizures (tonic stiffness, eyes rolling upwards, and subtle arm (right?) jerking)  Hospital admission 10/10-10/26/2017, due to seizures in setting of infection, was put on continuous video EEG monitoring to determine her seizure type, seizure frequency, and rapid medication adjustments  Rapid med changes: d/c'ed lamotrigine, restarted levetiracetam, attempted discontinuation of valproic acid (more seizures, so restarted), attempted reduction in Onfi, and starting Fycompa  The patient's seizures were mostly based during sleep, tonic seizures  Summary of 2018  Started with RFN [id number], PER 8, LEV 1674-4374, CLB 5-15, -250,  TID  Due to excessive somnolence Onfi was weaned off  She was on continuous EEG monitoring when she was in hospital for PNA that showed very frequent tonic seizures during sleep  VPA is causing hyperammoniemia  She has had more admissions for somnolence / lethargy, VPA was reduced for transaminitis  She was tested for inborn error of metabolism with plasma amino acid, urine organic acid, and acylcarnitine levels  There were nonspecific elevations in some amino acid or organic acid but no pattern consistent with a specific inborn error of metabolism  Elevated carnitine level was consistent with supplementation  Higher doses of phenobarbital may also contribute to sedation, phenobarbital was reduced to 20mg but on follow-up she was on 20mL of oral solution (80mg / day)  There is variable reporting in the frequency of tonic seizures (these are the eyes are rolling back and shaking, then stop, then happen again in 10-15 minutes)  Her level of alertness is also variable with erratic sleep cycle  Summary 2019  We started the year with  TID, RFN [id number], -200, LEV 1319-2009, PER 10, PB 40 qHS  In December 2018, Hospital admission for septic shock and aspiration PNA    She had an EEG that captured recurrent tonic seizures during sleep but this is consistently found in all of her other EEG studies  Another hospital admission January 2019 for recurrent convulsive seizures  Due to sedation Phenobarbital was weaned off and Fycompa was increased to 10mg at bedtime  TPM increased to 250-250  Tried to wean off of VPA due to ammonia levels  We started Epidiolex in the Spring 2019, which seems to have improved seizure control  January 2019 - Her CNA reports that Nata Burgess is no longer Nata Burgess, she is essentially bed ridden  She does not eat and does not walk  Nata Burgess usually participate in activities, walking, and eating food (if she was at a [facility] she would be able to eat a hamburger without difficulty)  I was able to speak to her father Katy Parks and he reported that he too saw a significant decline in mental status  There was a phone call from Nahid Tsai reporting an increase in seizure activity (brief episodes tonic-myoclonic jerking); but subsequent reports did not notice an increase in activity  We weaned her off of phenobarbital, suspecting that it was causing elevated transaminases  There is variable periods of wakefulness and alertness; seizures are reported sporadically, sometimes she seems to have clusters of seizures (during an office visit there were about 8 tonic seizures lasting 15-20 seconds)  We weaned her off of valproic acid and increased Epidiolex  She was admitted to hospital in June 2019 for status epilepticus (multiple clinical seizures, every 5-6 minutes, associated with apnea)  She was put on continuous EEG monitoring which showed very frequent tonic seizures lasting 15-30 seconds at a time, whether this is different from her baseline is difficult to determine  Since she was on continuous EEG monitoring a number of medication trials were given  At one point it seems that lorazepam and more levetiracetam had improved seizures    Her tube feed was adjust to more protein and lower carbohydrates  There were telephone calls for recurrent seizures   2019 - small recurrent seizures in the morning, zoning out with whole body shaking,   10/30/2019 - 2 seizures over night used VNS magnet, helped  2019 - cluster of seizures 15-45 seconds (used diazepam and VNS)  2019 - 6 seizures over night 30 seconds  We started her on Briviact to transition off of Levetiracetam at the end of   Special Features  Status epilepticus: yes  Self Injury Seizures: No  Precipitating Factors: menstrual cycle? ?     Epilepsy Risk Factors:  Abnormal pregnancy: unknown  Abnormal birth/: unknown  Abnormal Development: unknown developmental history  Febrile seizures, simple: unknown  Febrile seizures, complex: unknown  CNS infection: No  Mental retardation: Yes  Cerebral palsy: Yes  Head injury (moderate/severe): possibly anoxic brain injury  CNS neoplasm: No  CNS malformation: No  Neurosurgical procedure: No  Stroke: No  Alcohol abuse: No  Drug abuse: No  Family history Sz/epilepsy: unknown    Prior AEDs:  Rufinamide  Clobazam (excessive sedation)  Clonazepam  Levetiracetam (unclear if beneficial)  Lamotrigine (unclear if beneficial)  Topiramate (missed doses caused breakthrough seizures)  Valproate (elevated ammonia levels)  Fycompa (excess sedation)  Felbamate (listed as a drug allergy)  Phenobarbital (contributing to sedation)  Epidiolex (seems to help the most)  Brivaracetam    Prior workup:  x   Imaging:  CT head 2013  Normal CT of brain    CT head 2018  No acute intracranial pathology    3/20/2019  MRI brain w/wo contrast  Normal MR brain study  Symmetric hippocampal formations    EEGs:  Continuous video EEG monitoring 10/13 - 10/15/2017  Frequent generalized spike/polyspike-slow wave complexes during sleep  At times there are independent right more than left midtemporal spikes and bitemporal spikes    Innumerable generalized tonic seizures during sleep, generalized 1 Hz period discharges, that would become continuous for 30 seconds or generalized rhythmic alpha-beta discharges, associated with patient becoming rigid, tonic posturing with arms elevated and tense with subtle jerking of the upper body, eyes opening with upward deviation  Dr Amarilys Maier reported 4 ictal patterns:  1 - 1-3 seconds of diffuse electrodecrement then 2-7 seconds of fast activity then 2Hz spike wave discharges (no clinical manifestation)  2 - same as #1, clinically accompanied by posturing of the arms, then clonic jerking  3 - diffuse low fast activity without progressing to spike-wave discharges  4 - 2 Hz generalized discharges for 2-3 minutes with no clonical manifestations  By 10/15/2017 - the tonic clonic seizures were less frequent    Continuous video EEG monitoring 10/18 - 10/25/2017  Diffuse background slowing with bursts of arrhythmic generalized spike wave discharges, with shifting lateralization, and independent left and  right temporal spikes  Mild eyelid myoclonia associated with brief bursts of 2-2 5 Hz generalized discharges  Tonic seizures with eelctrodecrement  There were innumerable tonic seizures; however by 10/25/2017 the frequency of these seizures did decrease in frequency  Continuous video EEG monitoring 12/1-12/5/2017  There are innumerable tonic seizures that are generally less than one minute in duration (30-40 seconds), these consists of subtle eye opening and tonic stiffening of arms, if longer then whole body stiffening with clonic jerking movements  These almost always occur exclusively out of sleep  These consist of 2-2 5 Hz generalized spike-slow wave complexes with generalized attenuation of activity (desynchronization) or paroxysmal fast activity    Per 24 hours count of seizures could be      12/19/2018 routine study  Frequent recurrent tonic seizures associated with paroxysmal generalized fast activity then generalized rhythmic discharges associated with eyes upward deviation, and myoclonic/clonic jerking of the upper body, excess beta activity  6/28-7/3/2019 continuous video EEG monitoring  Frequent clusters of tonic seizures (body rigidity, head flexions, eyes go up with dysconjugate gaze, and clonic activity of the arms for a few seconds), these are associated with GPFA and rhythmic spike-slow waves  There are also bilateral temporal focal epileptiform discharges  Labs:  3/30/2020   1/3 8/110/24/21/0 65/152  LFT 7 8/3 6/0 2/32/61/120  Component      Latest Ref Rng & Units 6/27/2019 6/29/2019 7/2/2019   TOPIRAMATE LEVEL      2 0 - 25 0 ug/mL 4 3  16 5   LEVETIRACETA (KEPPRA)      10 0 - 40 0 ug/mL 26 4     Rufinamide (Banzel)      ug/mL  29 1    Ammonia      11 - 35 umol/L   34     Component TOPIRAMATE LEVEL   Latest Ref Rng & Units 2 0 - 25 0 ug/mL   8/20/2018 8 1   9/6/2018 9 8   6/27/2019 4 3   7/2/2019 16 5     General exam   Blood pressure 93/56, pulse 88 ; [place] nursing reports that she is 84 6 pounds  Appearance: awake, thin build, sleepy but arousable; she is on a gurney  Carotids: n/a  Cardiovascular: regular rate and rhythm  Pulmonary: clear to auscultation   Abdomen: soft to palpation    HEENT: anicteric   Fundoscopy: not assessed    Mental status  Orientation: nonverbal and alert  Fund of Knowledge: nonverbal   Attention and Concentration: nonverbal  Current and Remote Memory:nonverbal  Language: nonverbal   Behavior: she tracks toys and reaches out for them, she'll hold a told briefly then will try to drop it on the floor      Cranial Nerves  CN 1: not tested  CN 2: pupils equal round reactive to direct and consenual light   CN 3, 4, 6: dysconjugate eyes, mild nystagmus with movement; left eye is more exotropic  CN 5:not assessed  CN 7:muscles of facial expression are symmetric and corneal reflex is intact symmetrically  CN 8:not assessed  CN 9, 10:not assessed  CN 11:not assessed  CN 12:not assessed    Motor:  Bulk, Tone: thin muscle build, relatively hypotonic tone  Pronation: not assessed  Strength: There is no overt lateralizing weakness, she does not follow instructions for confrontational testing      Sensory:  Lighttouch: not assessed due to cognitive impairment    Coordination: Unable to test    Reflexes: not assessed    Past Medical/Surgical History:  Patient Active Problem List   Diagnosis    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    Underweight    Osteoporosis    Onychomycosis    Hyperkeratosis    Cerebral anoxic injury ([facility] )    ADHD, predominantly inattentive type    Intractable epilepsy with status epilepticus ([facility]    Somnolence    Low blood pressure    Incontinence    Skin breakdown    MRSA colonization    Right atrial enlargement    Hypophosphatemia    Sedated due to multiple medications    Breast mass    S/P placement of VNS (vagus nerve stimulation) device    Protein calorie malnutrition (HCC)    Dislodged gastrostomy tube (HCC)    Acute cystitis with hematuria    Fatty infiltration of liver    Intellectual disability with epilepsy ([facility]    Labyrinthine disorder    Labial cyst     Past Medical History:   Diagnosis Date    ADHD     Anoxic brain damage (HCC)     Autistic disorder     Breakthrough seizure ([facility] ) 8/1/2019    Hyperammonemia (HCC)     Hyperkeratosis     Hypotension     Intellectual disability     Lennox-Gastaut syndrome with tonic seizures (HCC)     Lethargy     Liver enzyme elevation     Onychomycosis     Osteoporosis     Osteoporosis      Past Surgical History:   Procedure Laterality Date    ABDOMINAL SURGERY      CARDIAC PACEMAKER PLACEMENT      vns implant l chest    IR PEG/GJ TUBE PLACEMENT  11/21/2019    IR THORACENTESIS  12/17/2018    JEJUNOSTOMY FEEDING TUBE      PEG TUBE PLACEMENT      IA IMP STIM,CRANIAL,SUBQ,1 ARRAY Left 12/18/2019    Procedure: REPLACEMENT IMPLANTABLE PULSE GENERATOR FOR VAGAL NERVE STIMULATOR, LEFT CHEST;  Surgeon: Carlo Workman MD; Location: [facility];  Service: Neurosurgery     Past Psychiatric History:  History of behavioral agitation but she is no longer on a one to one because she is generally too sedated      Medications:    Current Outpatient Medications:     acetaminophen (TYLENOL) 160 mg/5 mL suspension, Take 20 9 mL (668 8 mg total) by mouth every 6 (six) hours as needed for mild pain Start 12/18 after surgery, Disp: 118 mL, Rfl: 0    bisacodyl (BISCOLAX) 10 mg suppository, Insert 10 mg into the rectum daily at bedtime as needed for constipation (if no BM day #4), Disp: , Rfl:     bisacodyl (FLEET BISACODYL) 10 MG/30ML ENEM, Insert 10 mg into the rectum as needed for constipation Give at hs on day 5 if suppository is not effective, Disp: , Rfl:     Brivaracetam (Briviact) 10 MG/ML SOLN, 10 mL (100 mg total) by Per PEG Tube route every 12 (twelve) hours, Disp: 600 mL, Rfl: 5    cannabidiol (Epidiolex) 100 mg/ml SOLN, 4 mL (400 mg total) by Per G Tube route 2 (two) times a day, Disp: 240 mL, Rfl: 5    cloBAZam (ONFI) 10 MG tablet, 0 5 tablets (5 mg total) by Per G Tube route daily at bedtime, Disp: 15 tablet, Rfl: 5    diazepam (VALIUM) 5 MG/ML solution, Give 1mL by PEG tube PRN generalized tonic clonic seizure longer than 5 minutes or continuous multiple tonic seizures over 30 minutes, Disp: 30 mL, Rfl: 0    levETIRAcetam (KEPPRA) 100 mg/mL oral solution, 5 mL (500 mg total) by Per G Tube route every 12 (twelve) hours For 30 days then stop, Disp: 300 mL, Rfl: 0    magnesium hydroxide (MILK OF MAGNESIA) 400 mg/5 mL oral suspension, Take 30 mL by mouth daily as needed for constipation If not BM by day 3, Disp: , Rfl:     MELATONIN PO, 6 mg by Per G Tube route daily at bedtime, Disp: , Rfl:     Multiple Vitamins-Minerals (MULTIVITAMIN WITH IRON-MINERALS) liquid, 5 mL by Per G Tube route daily, Disp: , Rfl:     Probiotic Product (ALEXANDER-BID PROBIOTIC PO), 1 tablet by Per G Tube route daily  , Disp: , Rfl:     rufinamide (BANZEL) 400 mg tablet, 4 tablets (1,600 mg total) by Per G Tube route every 12 (twelve) hours, Disp: 240 tablet, Rfl: 5    topiramate (TOPAMAX) 50 MG tablet, 5 tablets (250 mg total) by Per G Tube route every 12 (twelve) hours, Disp: 300 tablet, Rfl: 5    Allergies: Allergies   Allergen Reactions    Felbamate        Family history:  History reviewed  No pertinent family history  There is no family history of seizure, epilepsy or developmental delay  Social History  Living situation:  Resident of [facility]   reports that she has never smoked  She has never used smokeless tobacco  She reports that she drank alcohol  She reports that she does not use drugs  Review of Systems  A review of at least 12 organ/systems was obtained by the medical assistant and reviewed by me, including additional positives/negatives:  A review of at least 12 organ/systems was evaluated and there are no complaints  Decision making was of high-complexity due to the patient's high risk condition (seizures), psychiatric and neuropsychological comorbidities, behavioral problems, memory and cognitive problems and medication side effects  The total amount of time spent with the patient was 37 minutes  More than 50% of this time was devoted to counseling and coordination of care  Issues addressed during this clinic visit included overall management, medication counseling or monitoring (including adverse effects, side effects and risks of antiepileptic medications)     Start time: 1:00PM  End time: 1:37PM    Neurology/Epilepsy Procedure Note    VNS Interrogation and Reprogramming performed on 3/10/2020    Model:   S/N: [id number]  Date of implant: 12/18/2019    Neurology/Epilepsy Procedure Note    VNS Interrogation and Reprogramming     VNS Interrogation    Current settings:  Output Current 2 0 mAmp   Signal Frequency 20 Hz   Pulse Width 250 microsec   Signal On Time 21 sec   Signal Off Time 0 8 min     AutoStimulation settings:  AutoStim Output 0 mAmp   Pulse Width 250 microsec   AutoStim On Time 30 sec     Magnet settings:  Mag Output 2 25 mAmp   Pulse Width 500 microsec   Mag On Time 60 sec     Tachycardia Detection:  On  Heartbeat verification: Yes  Heartbeat Sensitivity:  4  Threshold:  60%    Diagnostics:  Communication OK  Output current 2 0mA  Lead Impedance OK  Impedance value 1987 Ohms  IFI OK  Battery indicator %    Lifetime Magnet activation 0  % stimulation 35    Reprogrammed settings:  Output Current 2 0 mAmp   Signal Frequency 20 Hz   Pulse Width 250 microsec   Signal On Time 30 sec   Signal Off Time 1 1 min     AutoStimulation settings:  AutoStim Output 2 0 mAmp   Pulse Width 500 microsec   AutoStim On Time 30 sec     Magnet settings:  Mag Output 2 25 mAmp   Pulse Width 500 microsec   Mag On Time 60 sec

## 2020-03-10 NOTE — PROGRESS NOTES
Review of Systems    {LimROS-complete:17885}      Review of Systems   Constitutional: Negative  Negative for appetite change and fever  HENT: Negative  Negative for hearing loss, tinnitus, trouble swallowing and voice change  Eyes: Negative  Negative for photophobia and pain  Respiratory: Negative  Negative for shortness of breath  Cardiovascular: Negative  Negative for palpitations  Gastrointestinal: Negative  Negative for nausea and vomiting  Endocrine: Negative  Negative for cold intolerance and heat intolerance  Genitourinary: Negative  Negative for dysuria, frequency and urgency  Musculoskeletal: Negative  Negative for myalgias and neck pain  Skin: Negative  Negative for rash  Neurological: Negative  Negative for dizziness, tremors, seizures, syncope, facial asymmetry, speech difficulty, weakness, light-headedness, numbness and headaches  Hematological: Negative  Does not bruise/bleed easily  Psychiatric/Behavioral: Negative  Negative for confusion, hallucinations and sleep disturbance

## 2020-03-10 NOTE — PATIENT INSTRUCTIONS
Plan:   Medications are given by PEG  1 - decrease Levetiracetam 100mg/mL to give 5mL every 12 hours for 30 days then stop  2 - increase Briviact 10mg/mL to 10mL (100mg) every 12 hours  3 - continue with Topiramate 250mg twice a day  4 - continue Banzel 400mg give 4 tabs twice a day  5 - continue Epidiolex 100mg/mL 4 mL (400mg) twice a day (Goes to [facility])  6 - continue Onfi 5mg at bedtime  7 - may use diazepam 5mg/mL oral solution, give 1mL by peg tube as needed for generalized tonic clonic seizure lasting more than 5 minutes  8 - please have dietary / nutritional consultation to evaluate patient's protein calorie malnutrition and weight loss  Please have nutritional consultation consider giving tube feeds that are high in protein, relatively low in glucose (Low glycemic Index diet), low glycemic index diet has been used to decrease frequency of seizures in intractable epilepsy    9 - follow-up in 3 months    VNS Settings  Model:   S/N: [id number]  Date of implant: 12/18/2019    Tachycardia Detection:  On  Heartbeat verification: Yes  Heartbeat Sensitivity:  4  Threshold:  50%    Diagnostics:  Communication OK  Output current 2 0mA  Lead Impedance OK  Impedance value 1987  IFI OK  Battery indicator %    Lifetime Magnet activation 0  % stimulation 35    Reprogrammed settings:  Output Current 2 0 mAmp   Signal Frequency 20 Hz   Pulse Width 250 microsec   Signal On Time 30 sec   Signal Off Time 1 1 min     AutoStimulation settings:  AutoStim Output 2 0 mAmp   Pulse Width 500 microsec   AutoStim On Time 30 sec     Magnet settings:  Mag Output 2 25 mAmp   Pulse Width 500 microsec   Mag On Time 60 sec

## 2020-03-19 DIAGNOSIS — G40.812 LENNOX-GASTAUT SYNDROME WITH TONIC SEIZURES (HCC): ICD-10-CM

## 2020-03-19 NOTE — TELEPHONE ENCOUNTER
[facility] from   [facility] asking for refill of Briviact  This was printed on 3/10/20  They do not have this script  Pt only has enough medication for today  [facility] states they use [facility]

## 2020-05-15 DIAGNOSIS — G40.812 LENNOX-GASTAUT SYNDROME WITH TONIC SEIZURES (HCC): ICD-10-CM

## 2020-05-15 DIAGNOSIS — G40.813 INTRACTABLE LENNOX-GASTAUT SYNDROME WITH STATUS EPILEPTICUS (HCC): Primary | ICD-10-CM

## 2020-05-18 ENCOUNTER — HOSPITAL ENCOUNTER (EMERGENCY)
Facility: HOSPITAL | Age: 40
Discharge: HOME | End: 2020-05-19
Attending: EMERGENCY MEDICINE | Admitting: EMERGENCY MEDICINE
Payer: MEDICARE

## 2020-05-18 ENCOUNTER — APPOINTMENT (EMERGENCY)
Dept: RADIOLOGY | Facility: HOSPITAL | Age: 40
End: 2020-05-18
Payer: MEDICARE

## 2020-05-18 VITALS
SYSTOLIC BLOOD PRESSURE: 118 MMHG | WEIGHT: 87.52 LBS | HEART RATE: 94 BPM | TEMPERATURE: 99.2 F | OXYGEN SATURATION: 99 % | BODY MASS INDEX: 17.09 KG/M2 | DIASTOLIC BLOOD PRESSURE: 73 MMHG | RESPIRATION RATE: 18 BRPM

## 2020-05-18 DIAGNOSIS — K94.13 MALFUNCTIONING JEJUNOSTOMY TUBE (HCC): Primary | ICD-10-CM

## 2020-05-18 PROCEDURE — 74018 RADEX ABDOMEN 1 VIEW: CPT

## 2020-05-18 PROCEDURE — 99284 EMERGENCY DEPT VISIT MOD MDM: CPT

## 2020-05-19 ENCOUNTER — APPOINTMENT (EMERGENCY)
Dept: RADIOLOGY | Facility: HOSPITAL | Age: 40
End: 2020-05-19
Payer: MEDICARE

## 2020-05-19 PROCEDURE — 74018 RADEX ABDOMEN 1 VIEW: CPT

## 2020-05-19 PROCEDURE — 99282 EMERGENCY DEPT VISIT SF MDM: CPT | Performed by: EMERGENCY MEDICINE

## 2020-05-27 RX ORDER — CLOBAZAM 10 MG/1
10 TABLET ORAL
Qty: 30 TABLET | Refills: 5 | Status: SHIPPED | OUTPATIENT
Start: 2020-05-27 | End: 2020-05-28 | Stop reason: SDUPTHER

## 2020-05-28 RX ORDER — CLOBAZAM 10 MG/1
10 TABLET ORAL
Qty: 30 TABLET | Refills: 5 | Status: SHIPPED | OUTPATIENT
Start: 2020-05-28 | End: 2020-10-13 | Stop reason: SDUPTHER

## 2020-06-05 ENCOUNTER — OFFICE VISIT (OUTPATIENT)
Dept: GASTROENTEROLOGY | Facility: CLINIC | Age: 40
End: 2020-06-05
Payer: MEDICARE

## 2020-06-05 VITALS
HEART RATE: 81 BPM | SYSTOLIC BLOOD PRESSURE: 112 MMHG | BODY MASS INDEX: 16.96 KG/M2 | TEMPERATURE: 99.1 F | DIASTOLIC BLOOD PRESSURE: 64 MMHG | WEIGHT: 86.4 LBS | HEIGHT: 60 IN

## 2020-06-05 DIAGNOSIS — K94.23 PEG TUBE MALFUNCTION (HCC): Primary | ICD-10-CM

## 2020-06-05 PROCEDURE — 99203 OFFICE O/P NEW LOW 30 MIN: CPT | Performed by: INTERNAL MEDICINE

## 2020-06-10 ENCOUNTER — TELEPHONE (OUTPATIENT)
Dept: NEUROLOGY | Facility: CLINIC | Age: 40
End: 2020-06-10

## 2020-06-12 ENCOUNTER — TELEPHONE (OUTPATIENT)
Dept: NEUROLOGY | Facility: CLINIC | Age: 40
End: 2020-06-12

## 2020-06-12 ENCOUNTER — TELEPHONE (OUTPATIENT)
Dept: INTERVENTIONAL RADIOLOGY/VASCULAR | Facility: HOSPITAL | Age: 40
End: 2020-06-12

## 2020-06-16 ENCOUNTER — TELEPHONE (OUTPATIENT)
Dept: NEUROLOGY | Facility: CLINIC | Age: 40
End: 2020-06-16

## 2020-06-16 ENCOUNTER — TELEMEDICINE (OUTPATIENT)
Dept: NEUROLOGY | Facility: CLINIC | Age: 40
End: 2020-06-16
Payer: MEDICARE

## 2020-06-16 VITALS
SYSTOLIC BLOOD PRESSURE: 112 MMHG | WEIGHT: 86.4 LBS | TEMPERATURE: 97.5 F | BODY MASS INDEX: 16.87 KG/M2 | DIASTOLIC BLOOD PRESSURE: 68 MMHG | HEART RATE: 82 BPM

## 2020-06-16 DIAGNOSIS — G40.909 INTELLECTUAL DISABILITY WITH EPILEPSY (HCC): ICD-10-CM

## 2020-06-16 DIAGNOSIS — F79 INTELLECTUAL DISABILITY WITH EPILEPSY (HCC): ICD-10-CM

## 2020-06-16 DIAGNOSIS — G40.813 INTRACTABLE LENNOX-GASTAUT SYNDROME WITH STATUS EPILEPTICUS (HCC): Primary | ICD-10-CM

## 2020-06-16 PROBLEM — N30.01 ACUTE CYSTITIS WITH HEMATURIA: Status: RESOLVED | Noted: 2019-08-02 | Resolved: 2020-06-16

## 2020-06-16 PROCEDURE — 99214 OFFICE O/P EST MOD 30 MIN: CPT | Performed by: PSYCHIATRY & NEUROLOGY

## 2020-07-22 ENCOUNTER — TELEPHONE (OUTPATIENT)
Dept: NEUROLOGY | Facility: CLINIC | Age: 40
End: 2020-07-22

## 2020-08-08 ENCOUNTER — APPOINTMENT (EMERGENCY)
Dept: RADIOLOGY | Facility: HOSPITAL | Age: 40
End: 2020-08-08
Payer: MEDICARE

## 2020-08-08 ENCOUNTER — HOSPITAL ENCOUNTER (EMERGENCY)
Facility: HOSPITAL | Age: 40
Discharge: HOME | End: 2020-08-08
Attending: EMERGENCY MEDICINE
Payer: MEDICARE

## 2020-08-08 VITALS
HEIGHT: 60 IN | DIASTOLIC BLOOD PRESSURE: 68 MMHG | HEART RATE: 81 BPM | RESPIRATION RATE: 18 BRPM | SYSTOLIC BLOOD PRESSURE: 104 MMHG | TEMPERATURE: 97.9 F | WEIGHT: 95.46 LBS | OXYGEN SATURATION: 98 % | BODY MASS INDEX: 18.74 KG/M2

## 2020-08-08 DIAGNOSIS — H10.31 ACUTE CONJUNCTIVITIS OF RIGHT EYE: ICD-10-CM

## 2020-08-08 DIAGNOSIS — T85.528A GASTROJEJUNOSTOMY TUBE DISLODGEMENT: Primary | ICD-10-CM

## 2020-08-08 LAB — SARS-COV-2 RNA RESP QL NAA+PROBE: NEGATIVE

## 2020-08-08 PROCEDURE — 74018 RADEX ABDOMEN 1 VIEW: CPT

## 2020-08-08 PROCEDURE — 87635 SARS-COV-2 COVID-19 AMP PRB: CPT | Performed by: EMERGENCY MEDICINE

## 2020-08-08 PROCEDURE — 99284 EMERGENCY DEPT VISIT MOD MDM: CPT

## 2020-08-08 PROCEDURE — 99284 EMERGENCY DEPT VISIT MOD MDM: CPT | Performed by: EMERGENCY MEDICINE

## 2020-08-08 RX ORDER — CLINDAMYCIN PALMITATE HYDROCHLORIDE 75 MG/5ML
10 SOLUTION ORAL 3 TIMES DAILY
Qty: 150 ML | Refills: 0 | Status: SHIPPED | OUTPATIENT
Start: 2020-08-08 | End: 2020-08-13

## 2020-08-08 RX ORDER — TETRACAINE HYDROCHLORIDE 5 MG/ML
1 SOLUTION OPHTHALMIC ONCE
Status: COMPLETED | OUTPATIENT
Start: 2020-08-08 | End: 2020-08-08

## 2020-08-08 RX ORDER — CLINDAMYCIN PALMITATE HYDROCHLORIDE 75 MG/5ML
300 SOLUTION ORAL ONCE
Status: COMPLETED | OUTPATIENT
Start: 2020-08-08 | End: 2020-08-08

## 2020-08-08 RX ORDER — OFLOXACIN 3 MG/ML
SOLUTION/ DROPS OPHTHALMIC
Qty: 5 ML | Refills: 0 | Status: SHIPPED | OUTPATIENT
Start: 2020-08-08 | End: 2020-08-15

## 2020-08-08 RX ORDER — OFLOXACIN 3 MG/ML
2 SOLUTION/ DROPS OPHTHALMIC ONCE
Status: COMPLETED | OUTPATIENT
Start: 2020-08-08 | End: 2020-08-08

## 2020-08-08 RX ADMIN — IOHEXOL 15 ML: 240 INJECTION, SOLUTION INTRATHECAL; INTRAVASCULAR; INTRAVENOUS; ORAL at 12:34

## 2020-08-08 RX ADMIN — TETRACAINE HYDROCHLORIDE 1 DROP: 5 SOLUTION OPHTHALMIC at 12:13

## 2020-08-08 RX ADMIN — FLUORESCEIN SODIUM 1 STRIP: 1 STRIP OPHTHALMIC at 12:13

## 2020-08-08 RX ADMIN — OFLOXACIN 2 DROP: 3 SOLUTION/ DROPS OPHTHALMIC at 12:33

## 2020-08-08 RX ADMIN — CLINDAMYCIN PALMITATE HYDROCHLORIDE 300 MG: 75 SOLUTION ORAL at 12:42

## 2020-08-08 NOTE — ED PROVIDER NOTES
History  Chief Complaint   Patient presents with    Feeding Tube Problem     sent from nursing home because feeding tube is cracked and leaking  also right eye is red and swollen  28-year-old female with profound mental disabilities presents for evaluation of redness around the right eye as well as a dislodged G-tube  Staff states the G-tube just became dislodged today and she began with redness of the eye today  History provided by:  EMS personnel (Staff at nursing home)  History limited by:  Patient nonverbal  Feeding Tube Problem   Severity:  Mild  Onset quality:  Sudden  Timing:  Constant  Associated symptoms: no abdominal pain, no chest pain, no congestion and no cough    Eye Problem   Location:  Right eye  Quality: Redness and discharge  Severity:  Mild  Onset quality:  Sudden  Timing:  Constant  Progression:  Unchanged  Context: not burn, not chemical exposure and not contact lens problem    Relieved by:  Nothing  Worsened by:  Nothing  Associated symptoms: discharge    Associated symptoms: no facial rash    Risk factors: no conjunctival hemorrhage        Prior to Admission Medications   Prescriptions Last Dose Informant Patient Reported? Taking?    Brivaracetam (Briviact) 10 MG/ML SOLN   No No   Sig: 10 mL (100 mg total) by Per PEG Tube route every 12 (twelve) hours   MELATONIN PO  Outside Facility (Specify) Yes No   Si mg by Per G Tube route daily at bedtime   Multiple Vitamins-Minerals (MULTIVITAMIN WITH IRON-MINERALS) liquid  Outside Facility (Specify) Yes No   Si mL by Per G Tube route daily   Probiotic Product (ALEXANDER-BID PROBIOTIC PO)  Outside Facility (Specify) Yes No   Si tablet by Per G Tube route daily     acetaminophen (TYLENOL) 160 mg/5 mL suspension   No No   Sig: Take 20 9 mL (668 8 mg total) by mouth every 6 (six) hours as needed for mild pain Start  after surgery   bisacodyl (BISCOLAX) 10 mg suppository  Outside Facility (Specify) Yes No   Sig: Insert 10 mg into the rectum daily at bedtime as needed for constipation (if no BM day #4)   bisacodyl (FLEET BISACODYL) 10 MG/30ML ENEM   Yes No   Sig: Insert 10 mg into the rectum as needed for constipation Give at hs on day 5 if suppository is not effective   cannabidiol (Epidiolex) 100 mg/ml SOLN   No No   Si mL (400 mg total) by Per G Tube route 2 (two) times a day   cloBAZam (ONFI) 10 MG tablet   No No   Si tablet (10 mg total) by Per G Tube route daily at bedtime   diazepam (VALIUM) 5 MG/ML solution   No No   Sig: Give 1mL by PEG tube PRN generalized tonic clonic seizure longer than 5 minutes or continuous multiple tonic seizures over 30 minutes   magnesium hydroxide (MILK OF MAGNESIA) 400 mg/5 mL oral suspension  Outside Facility (Specify) Yes No   Sig: Take 30 mL by mouth daily as needed for constipation If not BM by day 3   rufinamide (BANZEL) 400 mg tablet   No No   Si tablets (1,600 mg total) by Per G Tube route every 12 (twelve) hours   topiramate (TOPAMAX) 50 MG tablet   No No   Si tablets (250 mg total) by Per G Tube route every 12 (twelve) hours      Facility-Administered Medications: None       Past Medical History:   Diagnosis Date    ADHD     Anoxic brain damage (HCC)     Autistic disorder     [facility] seizure [facility] [address] ) 2019    Dysphagia     Dysphagia, oropharyngeal phase     Gastrostomy tube dependent (HCC)     14 Fr as of 2020    Hyperammonemia (HCC)     Hyperkeratosis     Hypotension     Intellectual disability     Lennox-Gastaut syndrome with tonic seizures (HCC)     Lethargy     Liver enzyme elevation     Onychomycosis     Osteoporosis     Osteoporosis        Past Surgical History:   Procedure Laterality Date    ABDOMINAL SURGERY      CARDIAC PACEMAKER PLACEMENT      vns implant l chest    IR PEG/GJ TUBE PLACEMENT  2019    IR THORACENTESIS  2018    JEJUNOSTOMY FEEDING TUBE      history of - most recently, PEG tube    PEG TUBE PLACEMENT      [place] IMP STIM,CRANIAL,SUBQ,1 ARRAY Left 12/18/2019    Procedure: REPLACEMENT IMPLANTABLE PULSE GENERATOR FOR VAGAL NERVE STIMULATOR, LEFT CHEST;  Surgeon: Maya Elizalde MD;  [facility];  Service: Neurosurgery       History reviewed  No pertinent family history  I have reviewed and agree with the history as documented  E-Cigarette/Vaping    E-Cigarette Use Never User      E-Cigarette/Vaping Substances     Social History     Tobacco Use    Smoking status: Never Smoker    Smokeless tobacco: Never Used   Substance Use Topics    Alcohol use: Not Currently    Drug use: No       Review of Systems   Constitutional: Negative  HENT: Negative for congestion  Eyes: Positive for discharge  Respiratory: Negative for cough  Cardiovascular: Negative for chest pain  Gastrointestinal: Negative for abdominal pain  Endocrine: Negative  Genitourinary: Negative  Musculoskeletal: Negative  Skin: Negative  Minimal erythema to the right upper lid and right lower lid   Allergic/Immunologic: Negative  Neurological: Negative  Hematological: Negative  Psychiatric/Behavioral: Negative  All other systems reviewed and are negative  Physical Exam  Physical Exam  Vitals signs and nursing note reviewed  HENT:      Head: Normocephalic  Right Ear: There is no impacted cerumen  Left Ear: There is impacted cerumen  Nose: No congestion  Mouth/Throat:      Pharynx: No oropharyngeal exudate or posterior oropharyngeal erythema  Eyes:      Comments: Minimal edema and erythema to the right upper right lower lid   Neck:      Musculoskeletal: Normal range of motion  Cardiovascular:      Rate and Rhythm: Normal rate  Heart sounds: No murmur  Pulmonary:      Effort: Pulmonary effort is normal  No respiratory distress  Breath sounds: No stridor  Abdominal:      General: Abdomen is flat  There is no distension  Palpations: There is no mass     Musculoskeletal: General: No swelling or tenderness  Skin:     Capillary Refill: Capillary refill takes less than 2 seconds  Coloration: Skin is not jaundiced or pale  Neurological:      General: No focal deficit present  Mental Status: She is alert  Mental status is at baseline  Psychiatric:         Behavior: Behavior normal          Vital Signs  ED Triage Vitals   Temperature Pulse Respirations Blood Pressure SpO2   08/08/20 1207 08/08/20 1207 08/08/20 1207 08/08/20 1210 08/08/20 1207   97 9 °F (36 6 °C) 81 18 104/70 100 %      Temp Source Heart Rate Source Patient Position - Orthostatic VS BP Location FiO2 (%)   08/08/20 1207 08/08/20 1207 08/08/20 1210 08/08/20 1210 --   Temporal Monitor Lying Left arm       Pain Score       --                  Vitals:    08/08/20 1207 08/08/20 1210   BP:  104/70   Pulse: 81    Patient Position - Orthostatic VS:  Lying         Visual Acuity      ED Medications  Medications   clindamycin (CLEOCIN) oral solution 300 mg (has no administration in time range)   fluorescein sodium sterile ophthalmic strip 1 strip (1 strip Right Eye Given by Other 8/8/20 1213)   tetracaine 0 5 % ophthalmic solution 1 drop (1 drop Right Eye Given by Other 8/8/20 1213)   ofloxacin (OCUFLOX) 0 3 % ophthalmic solution 2 drop (2 drops Right Eye Given 8/8/20 1233)   iohexol (OMNIPAQUE) 240 MG/ML solution 15 mL (15 mL Intravenous Given 8/8/20 1234)       Diagnostic Studies  Results Reviewed     None                 XR abdomen 1 view kub   Final Result by Sola Borges MD (08/08 1237)      Status post contrast injection through patient's G-tube to ensure adequate positioning  Contrast is noted within the stomach confirming position within the stomach  There is a nonobstructive bowel gas pattern  Workstation performed: [id number]                    Procedures  Procedures         ED Course       US AUDIT      Most Recent Value   Initial Alcohol Screen: US AUDIT-C    1   How often do you have a drink containing alcohol?  0 Filed at: 08/08/2020 1213   2  How many drinks containing alcohol do you have on a typical day you are drinking? 0 Filed at: 08/08/2020 1213   3a  Male UNDER 65: How often do you have five or more drinks on one occasion? 0 Filed at: 08/08/2020 1213   3b  FEMALE Any Age, or MALE 65+: How often do you have 4 or more drinks on one occassion? 0 Filed at: 08/08/2020 1213   Audit-C Score  0 Filed at: 08/08/2020 1213                  SREE/DAST-10      Most Recent Value   How many times in the past year have you    Used an illegal drug or used a prescription medication for non-medical reasons? Never Filed at: 08/08/2020 1213                                MDM  Number of Diagnoses or Management Options  Acute conjunctivitis of right eye:   Gastrojejunostomy tube dislodgement [facility]):   Diagnosis management comments: Procedure note  G-tube change  The 15 [place] G-tube which was dislodged from the patient's abdomen was removed  A new 14 [place] G-tube was placed without difficulty  An x-ray was used to confirm proper tube placement      Procedure  Fluorescein examination of the right eye  Fluorescein examination of the right eye was undergone after 2 drops of tetracaine were instilled into the right eye  Fluorescein strip was swiped the bulbar region  There is no fluorescein uptake  Patient tolerated the procedure well   2 drops of ofloxacin ophthalmic solution were placed          Disposition  Final diagnoses:   Gastrojejunostomy tube dislodgement ([address] )   Acute conjunctivitis of right eye     Time reflects when diagnosis was documented in both MDM as applicable and the Disposition within this note     Time User Action Codes Description Comment    8/8/2020 12:22 PM Lynae Denver Add [Z43 4] Gastrojejunostomy tube dislodgement ([address] )     8/8/2020 12:22 PM Lynae Denver Add [H10 31] Acute conjunctivitis of right eye       ED Disposition     ED Disposition Condition Date/Time Comment    Discharge Stable Sat Aug 8, 2020 12:22 PM [facility] discharge to home/self care  Follow-up Information    None         Patient's Medications   Discharge Prescriptions    CLINDAMYCIN (CLEOCIN) 75 MG/5 ML SOLUTION    Take 9 6 mL (144 mg total) by mouth 3 (three) times a day for 5 days       Start Date: 8/8/2020  End Date: 8/13/2020       Order Dose: 144 mg       Quantity: 150 mL    Refills: 0    OFLOXACIN (OCUFLOX) 0 3 % OPHTHALMIC SOLUTION    Administer 2 drops to the right eye every 2 (two) hours for 2 days, THEN 2 drops 4 (four) times a day for 5 days  Start Date: 8/8/2020  End Date: 8/15/2020       Order Dose: --       Quantity: 5 mL    Refills: 0     No discharge procedures on file      PDMP Review       Value Time User    PDMP Reviewed  Yes 11/26/2019  2:02 PM Flora Heredia MD          ED Provider  Electronically Signed by           Rosa Evans DO  08/08/20 1236

## 2020-08-08 NOTE — ED NOTES
[facility] states only time available will be at 7PM this evening  Will continue to make other attempts for patient's transport       Lisa Avila RN  08/08/20 7549

## 2020-08-08 NOTE — ED NOTES
[facility] made aware of patient's discharged     Debbiejose Lees, [place]  08/08/20 2798

## 2020-08-08 NOTE — ED NOTES
Patient is sleeping at this time     [facility]  08/08/20 5048

## 2020-09-18 ENCOUNTER — HOSPITAL ENCOUNTER (EMERGENCY)
Facility: HOSPITAL | Age: 40
Discharge: HOME | End: 2020-09-18
Attending: EMERGENCY MEDICINE | Admitting: EMERGENCY MEDICINE
Payer: MEDICARE

## 2020-09-18 ENCOUNTER — TELEPHONE (OUTPATIENT)
Dept: NEUROLOGY | Facility: CLINIC | Age: 40
End: 2020-09-18

## 2020-09-18 VITALS
RESPIRATION RATE: 16 BRPM | DIASTOLIC BLOOD PRESSURE: 70 MMHG | OXYGEN SATURATION: 98 % | TEMPERATURE: 97.8 F | BODY MASS INDEX: 15.07 KG/M2 | HEART RATE: 82 BPM | SYSTOLIC BLOOD PRESSURE: 100 MMHG | WEIGHT: 77.16 LBS

## 2020-09-18 DIAGNOSIS — K94.23 PEG TUBE MALFUNCTION (HCC): Primary | ICD-10-CM

## 2020-09-18 PROCEDURE — 99282 EMERGENCY DEPT VISIT SF MDM: CPT

## 2020-09-18 PROCEDURE — 99282 EMERGENCY DEPT VISIT SF MDM: CPT | Performed by: PHYSICIAN ASSISTANT

## 2020-09-18 NOTE — TELEPHONE ENCOUNTER
Clari Dhaliwal from [place] [address] rehab called and states that pt is NPO, she gets all her meds via G tube  Around 0500, staff noticed that G tube was leaking  They are unable to administer pt's 0900 meds this morning  Pt was taken to  [facility] to get her tube replaced  Current meds  topamax 50 mg bid   epidiolex 4 ml bid   banzel 4 tabs bid  briviact 10 ml bid  onfi 10 mg at bedtime    States that pt might be back late afternoon   Asking if they should give her seizure meds when she gets back from the hospital?                         [phone] [facility]

## 2020-09-18 NOTE — TELEPHONE ENCOUNTER
If patient missed her morning doses of medications  Yes as soon as she is back to the home to administer all of her missed doses, then continue with scheduled evening dose

## 2020-09-18 NOTE — ED PROVIDER NOTES
History  Chief Complaint   Patient presents with    Feeding Tube Problem     pt presents for g tube leaking, 14 french     Patient presents from [facility] for evaluation of a leaking gastric tube  Patient has chronic gastric tube secondary to chronic physical and mental disabilities  According to staff the current gastric tube is leaking, it is been in place for about 5 weeks according to notation that is in our computer from its last change in the emergency department  She has no acute distress  No history of distended abdomen  Vital signs normal   No fever  Prior to Admission Medications   Prescriptions Last Dose Informant Patient Reported? Taking?    Brivaracetam (Briviact) 10 MG/ML SOLN   No No   Sig: 10 mL (100 mg total) by Per PEG Tube route every 12 (twelve) hours   MELATONIN PO  Outside Facility (Specify) Yes No   Si mg by Per G Tube route daily at bedtime   Multiple Vitamins-Minerals (MULTIVITAMIN WITH IRON-MINERALS) liquid  Outside Facility (Specify) Yes No   Si mL by Per G Tube route daily   Probiotic Product (ALEXANDER-BID PROBIOTIC PO)  Outside Facility (Specify) Yes No   Si tablet by Per G Tube route daily     acetaminophen (TYLENOL) 160 mg/5 mL suspension   No No   Sig: Take 20 9 mL (668 8 mg total) by mouth every 6 (six) hours as needed for mild pain Start  after surgery   bisacodyl (BISCOLAX) 10 mg suppository  Outside Facility (Specify) Yes No   Sig: Insert 10 mg into the rectum daily at bedtime as needed for constipation (if no BM day #4)   bisacodyl (FLEET BISACODYL) 10 MG/30ML ENEM   Yes No   Sig: Insert 10 mg into the rectum as needed for constipation Give at hs on day 5 if suppository is not effective   cannabidiol (Epidiolex) 100 mg/ml SOLN   No No   Si mL (400 mg total) by Per G Tube route 2 (two) times a day   cloBAZam (ONFI) 10 MG tablet   No No   Si tablet (10 mg total) by Per G Tube route daily at bedtime   diazepam (VALIUM) 5 MG/ML solution No No   Sig: Give 1mL by PEG tube PRN generalized tonic clonic seizure longer than 5 minutes or continuous multiple tonic seizures over 30 minutes   magnesium hydroxide (MILK OF MAGNESIA) 400 mg/5 mL oral suspension  Outside Facility (Specify) Yes No   Sig: Take 30 mL by mouth daily as needed for constipation If not BM by day 3   rufinamide (BANZEL) 400 mg tablet   No No   Si tablets (1,600 mg total) by Per G Tube route every 12 (twelve) hours   topiramate (TOPAMAX) 50 MG tablet   No No   Si tablets (250 mg total) by Per G Tube route every 12 (twelve) hours      Facility-Administered Medications: None       Past Medical History:   Diagnosis Date    ADHD     Anoxic brain damage ([facility] [address] )     Autistic disorder     Breakthrough seizure ([facility] [address] ) 2019    Dysphagia     Dysphagia, oropharyngeal phase     Gastrostomy tube dependent ([facility] [address] )     14 Fr as of 2020    Hyperammonemia (HCC)     Hyperkeratosis     Hypotension     Intellectual disability     Lennox-Gastaut syndrome with tonic seizures (HCC)     Lethargy     Liver enzyme elevation     Onychomycosis     Osteoporosis     Osteoporosis        Past Surgical History:   Procedure Laterality Date    ABDOMINAL SURGERY      CARDIAC PACEMAKER PLACEMENT      vns implant l chest    IR GASTROSTOMY TUBE PLACEMENT  2019    IR THORACENTESIS  2018    JEJUNOSTOMY FEEDING TUBE      history of - most recently, PEG tube    PEG TUBE PLACEMENT      IL IMP STIM,CRANIAL,SUBQ,1 ARRAY Left 2019    Procedure: REPLACEMENT IMPLANTABLE PULSE GENERATOR FOR VAGAL NERVE STIMULATOR, LEFT CHEST;  Surgeon: Raina Lizarraga MD;  Location: [facility];  Service: Neurosurgery       History reviewed  No pertinent family history  I have reviewed and agree with the history as documented      E-Cigarette/Vaping    E-Cigarette Use Never User      E-Cigarette/Vaping Substances     Social History     Tobacco Use    Smoking status: Never Smoker    Smokeless tobacco: Never Used   Substance Use Topics    Alcohol use: Not Currently    Drug use: No       Review of Systems   Unable to perform ROS: Patient nonverbal       Physical Exam  Physical Exam  Constitutional:       General: She is not in acute distress  Appearance: She is not ill-appearing, toxic-appearing or diaphoretic  HENT:      Head: Normocephalic  Eyes:      Conjunctiva/sclera: Conjunctivae normal    Neck:      Musculoskeletal: Normal range of motion  Cardiovascular:      Rate and Rhythm: Normal rate  Pulmonary:      Effort: Pulmonary effort is normal    Abdominal:      General: Abdomen is flat  There is no distension  Palpations: There is no mass  Tenderness: There is no abdominal tenderness  There is no right CVA tenderness, left CVA tenderness, guarding or rebound  Hernia: No hernia is present  Skin:     General: Skin is warm  Capillary Refill: Capillary refill takes less than 2 seconds  Neurological:      Mental Status: She is alert           Vital Signs  ED Triage Vitals [09/18/20 0944]   Temperature Pulse Respirations Blood Pressure SpO2   97 8 °F (36 6 °C) 82 16 100/70 98 %      Temp Source Heart Rate Source Patient Position - Orthostatic VS BP Location FiO2 (%)   Temporal Monitor Lying Left arm --      Pain Score       --           Vitals:    09/18/20 0944   BP: 100/70   Pulse: 82   Patient Position - Orthostatic VS: Lying         Visual Acuity      ED Medications  Medications - No data to display    Diagnostic Studies  Results Reviewed     None                 No orders to display              Procedures  General Procedure    Date/Time: 9/18/2020 10:00 AM  Performed by: Kenyetta Kaufman PA-C  Authorized by: Kenyetta Kaufman PA-C     Patient location:  Bedside  Assisting Provider(s): Yes (comment) (Dr Nino Doctor, Sarahy Wright RN)    Consent:     Consent obtained:  Emergent situation    Risks discussed:  Bleeding and pain    Alternatives discussed:  No treatment  Indications:     Indications:  G-tube malfunction  Pre-procedure details:     Skin preparation:  Betadine  Anesthesia (see MAR for exact dosages): Anesthesia method:  None  Procedure Detail:     Procedure note (site, laterality, method, findings): Initially balloon was deflated only 1 cc of fluid was noted, gastric tube was easily removed, area was cleansed with Betadine and dried prior to placement of the new 1 with sterile 4x4s  14 size gastric tube placed easily with surgical lube placement initially  4 cc of normal saline was placed into the balloon  Gastric contents were aspirated from the tube and normal saline was easily flushed  Patient tolerated the procedure well  Post-procedure details:     Patient tolerance of procedure: Tolerated well, no immediate complications             ED Course                           SBIRT 22yo+      Most Recent Value   SBIRT (22 yo +)   In order to provide better care to our patients, we are screening all of our patients for alcohol and drug use  Would it be okay to ask you these screening questions? Unable to answer at this time Filed at: 09/18/2020 0846                  MDM    Disposition  Final diagnoses:   PEG tube malfunction [facility])     Time reflects when diagnosis was documented in both MDM as applicable and the Disposition within this note     Time User Action Codes Description Comment    9/18/2020  9:55 AM Mickey DORSEY Add [K94 23] PEG tube malfunction [facility])       ED Disposition     ED Disposition Condition Date/Time Comment    Discharge Stable Fri Sep 18, 2020  9:55 AM [facility] discharge to home/self care              Follow-up Information     Follow up With Specialties Details Why [address] [facility], DO Family Medicine Schedule an appointment as soon as possible for a visit  As needed [address] [facility] [address] [phone]            Patient's Medications   Discharge Prescriptions    No medications on file No discharge procedures on file      PDMP Review       Value Time User    PDMP Reviewed  Yes 11/26/2019  2:02 PM Harish Perez MD          ED Provider  Electronically Signed by           Salvador Hancock PA-C  09/18/20 1002

## 2020-10-05 DIAGNOSIS — G40.812 LENNOX-GASTAUT SYNDROME WITH TONIC SEIZURES (HCC): ICD-10-CM

## 2020-10-08 ENCOUNTER — TELEPHONE (OUTPATIENT)
Dept: NEUROLOGY | Facility: CLINIC | Age: 40
End: 2020-10-08

## 2020-10-13 ENCOUNTER — TELEMEDICINE (OUTPATIENT)
Dept: NEUROLOGY | Facility: CLINIC | Age: 40
End: 2020-10-13
Payer: MEDICARE

## 2020-10-13 VITALS
WEIGHT: 83 LBS | DIASTOLIC BLOOD PRESSURE: 68 MMHG | TEMPERATURE: 97.8 F | BODY MASS INDEX: 16.21 KG/M2 | SYSTOLIC BLOOD PRESSURE: 112 MMHG | HEART RATE: 85 BPM

## 2020-10-13 DIAGNOSIS — R41.89 SEDATED DUE TO MULTIPLE MEDICATIONS: ICD-10-CM

## 2020-10-13 DIAGNOSIS — G40.813 INTRACTABLE LENNOX-GASTAUT SYNDROME WITH STATUS EPILEPTICUS (HCC): Primary | ICD-10-CM

## 2020-10-13 DIAGNOSIS — G40.812 LENNOX-GASTAUT SYNDROME WITH TONIC SEIZURES (HCC): ICD-10-CM

## 2020-10-13 DIAGNOSIS — R40.0 SOMNOLENCE: ICD-10-CM

## 2020-10-13 PROCEDURE — 99214 OFFICE O/P EST MOD 30 MIN: CPT | Performed by: PSYCHIATRY & NEUROLOGY

## 2020-10-13 RX ORDER — TOPIRAMATE 50 MG/1
250 TABLET, FILM COATED ORAL EVERY 12 HOURS SCHEDULED
Qty: 300 TABLET | Refills: 5 | Status: SHIPPED | OUTPATIENT
Start: 2020-10-13 | End: 2020-10-13 | Stop reason: SDUPTHER

## 2020-10-13 RX ORDER — ACETAMINOPHEN 325 MG/1
650 TABLET ORAL EVERY 6 HOURS PRN
COMMUNITY

## 2020-10-13 RX ORDER — RUFINAMIDE 400 MG/1
1600 TABLET, FILM COATED ORAL EVERY 12 HOURS
Qty: 240 TABLET | Refills: 5 | Status: SHIPPED | OUTPATIENT
Start: 2020-10-13 | End: 2021-02-22

## 2020-10-13 RX ORDER — CLOBAZAM 10 MG/1
10 TABLET ORAL
Qty: 30 TABLET | Refills: 5 | Status: SHIPPED | OUTPATIENT
Start: 2020-10-13 | End: 2020-10-13 | Stop reason: SDUPTHER

## 2020-10-13 RX ORDER — RUFINAMIDE 400 MG/1
1600 TABLET, FILM COATED ORAL EVERY 12 HOURS
Qty: 240 TABLET | Refills: 5 | Status: SHIPPED | OUTPATIENT
Start: 2020-10-13 | End: 2020-10-13 | Stop reason: SDUPTHER

## 2020-10-13 RX ORDER — DIAZEPAM 5 MG/ML
SOLUTION, CONCENTRATE ORAL
Qty: 30 ML | Refills: 0 | Status: SHIPPED | OUTPATIENT
Start: 2020-10-13 | End: 2021-07-08

## 2020-10-13 RX ORDER — CLOBAZAM 10 MG/1
10 TABLET ORAL
Qty: 30 TABLET | Refills: 5 | Status: SHIPPED | OUTPATIENT
Start: 2020-10-13 | End: 2021-02-22

## 2020-10-13 RX ORDER — TOPIRAMATE 50 MG/1
250 TABLET, FILM COATED ORAL EVERY 12 HOURS SCHEDULED
Qty: 300 TABLET | Refills: 5 | Status: SHIPPED | OUTPATIENT
Start: 2020-10-13 | End: 2021-02-22

## 2020-10-13 RX ORDER — BRIVARACETAM 10 MG/ML
7.5 SOLUTION ORAL EVERY 12 HOURS
Qty: 450 ML | Refills: 5 | Status: SHIPPED | OUTPATIENT
Start: 2020-10-13 | End: 2021-02-22

## 2020-10-13 RX ORDER — BRIVARACETAM 10 MG/ML
7.5 SOLUTION ORAL EVERY 12 HOURS
Qty: 450 ML | Refills: 5 | Status: SHIPPED | OUTPATIENT
Start: 2020-10-13 | End: 2020-10-13 | Stop reason: SDUPTHER

## 2020-10-19 NOTE — PROGRESS NOTES
Hospitalist Progress Note      PCP: No primary care provider on file. Date of Admission: 10/17/2020    Chief Complaint: [facility] Course: 18-year-old female with history of dementia presented to the hospital after a fall at home. Lives at home with daughter. As per admission note daughter stepped out for a couple of hours and when she came back she found her mother to be in supine position on the ground. Reports she slipped and fell. It was unwitnessed. It is reported that she has been frequently falling. According to the ER notes patient has ambulated with the walker via EMS and found to have a fever of 103 °F.  Afebrile while in ED. Information from Daughter Stella Sean -- Patient has been living with her daughter since April 2020  Has had multiple falls since then. Daughter tells me that she sits in a rocking chair and goes back and forth multiple times and sometimes she slips and skids onto the floor in the chair goes backwards. She does not feel that patient loses consciousness but not sure as mostly these falls are unwitnessed. There are reports of leg swelling with weeping for the last few months. Patient uses a walker at baseline but has been progressively getting weaker the last few weeks or months. She is able to dress herself in the morning but needs help with dressing in the evening, daughter does the cooking rest of the ADLs for the patient.     Subjective:   Patient seen and examined, daughter present at bedside  She denies any complains      Medications:  Reviewed    Infusion Medications   Scheduled Medications    sodium chloride flush  10 mL Intravenous 2 times per day    enoxaparin  30 mg Subcutaneous Daily    atorvastatin  20 mg Oral Nightly    miconazole   Topical BID     PRN Meds: sodium chloride flush, acetaminophen **OR** acetaminophen, polyethylene glycol, promethazine **OR** ondansetron      Intake/Output Summary (Last 24 hours) at 10/19/2020 1050  Last data filed at Review of Systems    {LimROS-complete:64779}      Review of Systems   Constitutional: Positive for appetite change  Negative for fever  HENT: Negative  Negative for hearing loss, tinnitus, trouble swallowing and voice change  Eyes: Negative  Negative for photophobia and pain  Respiratory: Negative  Negative for shortness of breath  Cardiovascular: Negative  Negative for palpitations  Gastrointestinal: Positive for diarrhea  Negative for nausea and vomiting  Endocrine: Negative  Negative for cold intolerance and heat intolerance  Genitourinary: Negative  Negative for dysuria, frequency and urgency  Musculoskeletal: Negative  Negative for myalgias and neck pain  Skin: Negative  Negative for rash  Neurological: Negative  Negative for dizziness, tremors, seizures, syncope, facial asymmetry, speech difficulty, weakness, light-headedness, numbness and headaches  Hematological: Negative  Does not bruise/bleed easily  Psychiatric/Behavioral: Negative  Negative for confusion, hallucinations and sleep disturbance  10/18/2020 2358  Gross per 24 hour   Intake 832 ml   Output 975 ml   Net -143 ml       Physical Exam Performed:    /72   Pulse 73   Temp 99.1 °F (37.3 °C) (Axillary)   Resp 18   Ht 5' 8\" (1.727 m)   Wt 194 lb 0.1 oz (88 kg)   SpO2 96%   BMI 29.50 kg/m²     General appearance: No apparent distress, appears stated age and cooperative. HEENT: Pupils equal, round, and reactive to light. Conjunctivae/corneas clear. Neck: Supple, with full range of motion. No jugular venous distention. Trachea midline. Respiratory:  Normal respiratory effort. Clear to auscultation, bilaterally without Rales/Wheezes/Rhonchi. Cardiovascular: Regular rate and rhythm with normal S1/S2 without murmurs, rubs or gallops. Abdomen: Soft, non-tender, non-distended with normal bowel sounds.   Musculoskeletal: Extensive bruising noted over the buttock area and perineum  Skin: Lower extremity venous stasis changes  Neurologic: Generalized weakness, nonfocal exam  Psychiatric: Alert oriented to self and place  Capillary Refill: Brisk,< 3 seconds   Peripheral Pulses: +2 palpable, equal bilaterally       Labs:   Recent Labs     10/17/20  1742 10/18/20  0556   WBC 7.1 5.0   HGB 13.9 12.6   HCT 41.0 36.9    257     Recent Labs     10/17/20  1742 10/18/20  0556   * 134*   K 3.4* 3.0*   CL 89* 91*   CO2 27 33*   BUN 15 14   CREATININE 1.2 1.3*   CALCIUM 9.4 9.2     Recent Labs     10/17/20  1742   AST 26   ALT 15   BILIDIR <0.2   BILITOT 0.5   ALKPHOS 118     Recent Labs     10/17/20  1742   INR 1.07     Recent Labs     10/17/20  1742 10/18/20  0013 10/18/20  0556   TROPONINI 0.03* 0.07* 0.05*       Urinalysis:      Lab Results   Component Value Date    NITRU Negative 10/17/2020    WBCUA 3-5 10/17/2020    BACTERIA Rare 10/17/2020    RBCUA 3-4 10/17/2020    BLOODU TRACE-INTACT 10/17/2020    SPECGRAV 1.020 10/17/2020    GLUCOSEU Negative 10/17/2020       Radiology:  CT CHEST ABDOMEN PELVIS W CONTRAST   Final Result      CHEST: 1.  Very large hiatal hernia containing nondilated transverse colon and the entire stomach in the right lower chest.   2.  Mild to moderate right lower lobe atelectasis. No evidence of pneumonia. ABDOMEN/PELVIS:      1.  Large right inguinal hernia containing nondilated distal ileum and proximal colon. No evidence of bowel obstruction. 2.  Cholelithiasis. 3.  Right renal cortical atrophy. 4.  Urinary bladder diverticulum. 5.  Colonic diverticulosis. 6.  Chronic L5 compression fracture with retropulsion resulting in severe L4-5 spinal canal stenosis, similar compared to report from outside hospital MR lumbar spine dated 8/16/2019   7. Severe left hip osteoarthritis. No acute fracture. CT Head WO Contrast   Final Result      1. No acute intracranial process. 2.  Moderate cerebral atrophy and mild chronic small vessel ischemic disease. XR CHEST PORTABLE   Final Result      Right pleural effusion and right lower lobe atelectasis as well as possible left upper lobe collapse. XR HIP BILATERAL W AP PELVIS (2 VIEWS)   Final Result   Impression:    No acute osseous injury. Severe left hip osteoarthritis. FL MODIFIED BARIUM SWALLOW W VIDEO    (Results Pending)           Assessment/Plan:    Active Hospital Problems    Diagnosis    Altered mental state [R41.82]     1. Falls at home, reported as mechanical fall, unwitnessed and a possible syncope  2. Generalized weakness/debility  3. Hyponatremia   4. Hypokalemia  5. Dementia  6. Elevated troponin  7.  Dyslipidemia    Plan:  Labs reviewed, bilateral abnormalities including hypokalemia/hyponatremia  We will replace as needed and check renal profile tomorrow  PT OT evaluation  Fall precautions  Check echo for wall motion abnormalities/syncopal work-up  Check orthostatic vitals every shift  Continue Lipitor  Case management on board for assisting with discharge planning  She has bilateral lower extremity venous stasis changes      DVT Prophylaxis: Lovenox  Diet: DIET LOW SODIUM 2 GM; 1500 ml  Code Status: Full Code    PT/OT Eval Status: ordered    Dispo-pending clinical course, continue inpatient  From home, depending on PT/OT evaluation    Hina Coats MD   Hospitalist

## 2020-12-06 ENCOUNTER — HOSPITAL ENCOUNTER (EMERGENCY)
Facility: HOSPITAL | Age: 40
Discharge: HOME | End: 2020-12-06
Attending: EMERGENCY MEDICINE
Payer: MEDICARE

## 2020-12-06 ENCOUNTER — APPOINTMENT (EMERGENCY)
Dept: RADIOLOGY | Facility: HOSPITAL | Age: 40
End: 2020-12-06
Payer: MEDICARE

## 2020-12-06 VITALS
RESPIRATION RATE: 18 BRPM | DIASTOLIC BLOOD PRESSURE: 52 MMHG | HEIGHT: 60 IN | TEMPERATURE: 98.2 F | WEIGHT: 84 LBS | SYSTOLIC BLOOD PRESSURE: 96 MMHG | HEART RATE: 74 BPM | OXYGEN SATURATION: 99 % | BODY MASS INDEX: 16.49 KG/M2

## 2020-12-06 DIAGNOSIS — K94.23 GASTROSTOMY TUBE DYSFUNCTION (HCC): Primary | ICD-10-CM

## 2020-12-06 PROCEDURE — 74018 RADEX ABDOMEN 1 VIEW: CPT

## 2020-12-06 PROCEDURE — 99283 EMERGENCY DEPT VISIT LOW MDM: CPT

## 2020-12-06 PROCEDURE — 99284 EMERGENCY DEPT VISIT MOD MDM: CPT | Performed by: EMERGENCY MEDICINE

## 2020-12-06 PROCEDURE — 43762 RPLC GTUBE NO REVJ TRC: CPT | Performed by: EMERGENCY MEDICINE

## 2020-12-06 RX ADMIN — IOHEXOL 25 ML: 240 INJECTION, SOLUTION INTRATHECAL; INTRAVASCULAR; INTRAVENOUS; ORAL at 01:06

## 2020-12-11 ENCOUNTER — TELEPHONE (OUTPATIENT)
Dept: NEUROLOGY | Facility: CLINIC | Age: 40
End: 2020-12-11

## 2021-02-02 ENCOUNTER — APPOINTMENT (EMERGENCY)
Dept: RADIOLOGY | Facility: HOSPITAL | Age: 41
DRG: 101 | End: 2021-02-02
Payer: MEDICARE

## 2021-02-02 ENCOUNTER — APPOINTMENT (EMERGENCY)
Dept: CT IMAGING | Facility: HOSPITAL | Age: 41
DRG: 101 | End: 2021-02-02
Payer: MEDICARE

## 2021-02-02 ENCOUNTER — HOSPITAL ENCOUNTER (INPATIENT)
Facility: HOSPITAL | Age: 41
LOS: 4 days | Discharge: NURSING, REHAB OR LONG-TERM CARE | DRG: 101 | End: 2021-02-06
Attending: EMERGENCY MEDICINE | Admitting: FAMILY MEDICINE
Payer: MEDICARE

## 2021-02-02 DIAGNOSIS — R47.01 NONVERBAL: ICD-10-CM

## 2021-02-02 DIAGNOSIS — G40.813 INTRACTABLE LENNOX-GASTAUT SYNDROME WITH STATUS EPILEPTICUS (HCC): ICD-10-CM

## 2021-02-02 DIAGNOSIS — R50.9 FEVER: ICD-10-CM

## 2021-02-02 DIAGNOSIS — G40.814 INTRACTABLE LENNOX-GASTAUT SYNDROME WITHOUT STATUS EPILEPTICUS (HCC): ICD-10-CM

## 2021-02-02 DIAGNOSIS — G40.919 BREAKTHROUGH SEIZURE (HCC): Primary | ICD-10-CM

## 2021-02-02 PROBLEM — R65.10 SIRS (SYSTEMIC INFLAMMATORY RESPONSE SYNDROME) (HCC): Status: ACTIVE | Noted: 2021-02-02

## 2021-02-02 LAB
ALBUMIN SERPL BCP-MCNC: 3.7 G/DL (ref 3.5–5)
ALP SERPL-CCNC: 121 U/L (ref 46–116)
ALT SERPL W P-5'-P-CCNC: 95 U/L (ref 12–78)
ANION GAP SERPL CALCULATED.3IONS-SCNC: 14 MMOL/L (ref 4–13)
AST SERPL W P-5'-P-CCNC: 53 U/L (ref 5–45)
BASOPHILS # BLD AUTO: 0.04 THOUSANDS/ΜL (ref 0–0.1)
BASOPHILS NFR BLD AUTO: 1 % (ref 0–1)
BILIRUB SERPL-MCNC: 0.3 MG/DL (ref 0.2–1)
BILIRUB UR QL STRIP: NEGATIVE
BUN SERPL-MCNC: 22 MG/DL (ref 5–25)
CALCIUM SERPL-MCNC: 9.4 MG/DL (ref 8.3–10.1)
CHLORIDE SERPL-SCNC: 108 MMOL/L (ref 100–108)
CK SERPL-CCNC: 107 U/L (ref 26–192)
CLARITY UR: NORMAL
CO2 SERPL-SCNC: 22 MMOL/L (ref 21–32)
COLOR UR: YELLOW
CREAT SERPL-MCNC: 0.63 MG/DL (ref 0.6–1.3)
EOSINOPHIL # BLD AUTO: 0.02 THOUSAND/ΜL (ref 0–0.61)
EOSINOPHIL NFR BLD AUTO: 0 % (ref 0–6)
ERYTHROCYTE [DISTWIDTH] IN BLOOD BY AUTOMATED COUNT: 12.9 % (ref 11.6–15.1)
GFR SERPL CREATININE-BSD FRML MDRD: 113 ML/MIN/1.73SQ M
GLUCOSE SERPL-MCNC: 159 MG/DL (ref 65–140)
GLUCOSE UR STRIP-MCNC: NEGATIVE MG/DL
HCT VFR BLD AUTO: 41.2 % (ref 34.8–46.1)
HGB BLD-MCNC: 13.2 G/DL (ref 11.5–15.4)
HGB UR QL STRIP.AUTO: NEGATIVE
IMM GRANULOCYTES # BLD AUTO: 0.02 THOUSAND/UL (ref 0–0.2)
IMM GRANULOCYTES NFR BLD AUTO: 0 % (ref 0–2)
KETONES UR STRIP-MCNC: NEGATIVE MG/DL
LACTATE SERPL-SCNC: 0.8 MMOL/L (ref 0.5–2)
LACTATE SERPL-SCNC: 2.7 MMOL/L (ref 0.5–2)
LEUKOCYTE ESTERASE UR QL STRIP: NEGATIVE
LYMPHOCYTES # BLD AUTO: 0.75 THOUSANDS/ΜL (ref 0.6–4.47)
LYMPHOCYTES NFR BLD AUTO: 10 % (ref 14–44)
MCH RBC QN AUTO: 32.4 PG (ref 26.8–34.3)
MCHC RBC AUTO-ENTMCNC: 32 G/DL (ref 31.4–37.4)
MCV RBC AUTO: 101 FL (ref 82–98)
MONOCYTES # BLD AUTO: 0.46 THOUSAND/ΜL (ref 0.17–1.22)
MONOCYTES NFR BLD AUTO: 6 % (ref 4–12)
NEUTROPHILS # BLD AUTO: 6.56 THOUSANDS/ΜL (ref 1.85–7.62)
NEUTS SEG NFR BLD AUTO: 83 % (ref 43–75)
NITRITE UR QL STRIP: NEGATIVE
NRBC BLD AUTO-RTO: 0 /100 WBCS
PH UR STRIP.AUTO: 6.5 [PH]
PLATELET # BLD AUTO: 161 THOUSANDS/UL (ref 149–390)
PMV BLD AUTO: 12.5 FL (ref 8.9–12.7)
POTASSIUM SERPL-SCNC: 3.7 MMOL/L (ref 3.5–5.3)
PROT SERPL-MCNC: 8.2 G/DL (ref 6.4–8.2)
PROT UR STRIP-MCNC: NEGATIVE MG/DL
RBC # BLD AUTO: 4.08 MILLION/UL (ref 3.81–5.12)
SODIUM SERPL-SCNC: 144 MMOL/L (ref 136–145)
SP GR UR STRIP.AUTO: 1.02 (ref 1–1.03)
TROPONIN I SERPL-MCNC: 0.04 NG/ML
TSH SERPL DL<=0.05 MIU/L-ACNC: 1.97 UIU/ML (ref 0.36–3.74)
UROBILINOGEN UR QL STRIP.AUTO: 0.2 E.U./DL
WBC # BLD AUTO: 7.85 THOUSAND/UL (ref 4.31–10.16)

## 2021-02-02 PROCEDURE — 74177 CT ABD & PELVIS W/CONTRAST: CPT

## 2021-02-02 PROCEDURE — 84484 ASSAY OF TROPONIN QUANT: CPT | Performed by: EMERGENCY MEDICINE

## 2021-02-02 PROCEDURE — 36415 COLL VENOUS BLD VENIPUNCTURE: CPT | Performed by: EMERGENCY MEDICINE

## 2021-02-02 PROCEDURE — 99285 EMERGENCY DEPT VISIT HI MDM: CPT

## 2021-02-02 PROCEDURE — 82550 ASSAY OF CK (CPK): CPT | Performed by: EMERGENCY MEDICINE

## 2021-02-02 PROCEDURE — G1004 CDSM NDSC: HCPCS

## 2021-02-02 PROCEDURE — 71260 CT THORAX DX C+: CPT

## 2021-02-02 PROCEDURE — 99284 EMERGENCY DEPT VISIT MOD MDM: CPT | Performed by: EMERGENCY MEDICINE

## 2021-02-02 PROCEDURE — 71045 X-RAY EXAM CHEST 1 VIEW: CPT

## 2021-02-02 PROCEDURE — 81003 URINALYSIS AUTO W/O SCOPE: CPT | Performed by: EMERGENCY MEDICINE

## 2021-02-02 PROCEDURE — 80053 COMPREHEN METABOLIC PANEL: CPT | Performed by: EMERGENCY MEDICINE

## 2021-02-02 PROCEDURE — 85025 COMPLETE CBC W/AUTO DIFF WBC: CPT | Performed by: EMERGENCY MEDICINE

## 2021-02-02 PROCEDURE — 99223 1ST HOSP IP/OBS HIGH 75: CPT | Performed by: PHYSICIAN ASSISTANT

## 2021-02-02 PROCEDURE — 84443 ASSAY THYROID STIM HORMONE: CPT | Performed by: EMERGENCY MEDICINE

## 2021-02-02 PROCEDURE — 87040 BLOOD CULTURE FOR BACTERIA: CPT | Performed by: EMERGENCY MEDICINE

## 2021-02-02 PROCEDURE — 84145 PROCALCITONIN (PCT): CPT | Performed by: PHYSICIAN ASSISTANT

## 2021-02-02 PROCEDURE — 83605 ASSAY OF LACTIC ACID: CPT | Performed by: EMERGENCY MEDICINE

## 2021-02-02 PROCEDURE — 93005 ELECTROCARDIOGRAM TRACING: CPT

## 2021-02-02 PROCEDURE — 87081 CULTURE SCREEN ONLY: CPT | Performed by: PHYSICIAN ASSISTANT

## 2021-02-02 RX ORDER — LANOLIN ALCOHOL/MO/W.PET/CERES
6 CREAM (GRAM) TOPICAL
COMMUNITY

## 2021-02-02 RX ORDER — LANOLIN ALCOHOL/MO/W.PET/CERES
6 CREAM (GRAM) TOPICAL
Status: DISCONTINUED | OUTPATIENT
Start: 2021-02-02 | End: 2021-02-06 | Stop reason: HOSPADM

## 2021-02-02 RX ORDER — SODIUM CHLORIDE 9 MG/ML
75 INJECTION, SOLUTION INTRAVENOUS CONTINUOUS
Status: DISPENSED | OUTPATIENT
Start: 2021-02-02 | End: 2021-02-03

## 2021-02-02 RX ORDER — LORAZEPAM 2 MG/ML
1 INJECTION INTRAMUSCULAR ONCE
Status: DISCONTINUED | OUTPATIENT
Start: 2021-02-02 | End: 2021-02-02

## 2021-02-02 RX ORDER — SODIUM CHLORIDE 9 MG/ML
75 INJECTION, SOLUTION INTRAVENOUS ONCE
Status: COMPLETED | OUTPATIENT
Start: 2021-02-02 | End: 2021-02-02

## 2021-02-02 RX ORDER — CEFTRIAXONE 1 G/50ML
1000 INJECTION, SOLUTION INTRAVENOUS EVERY 24 HOURS
Status: DISCONTINUED | OUTPATIENT
Start: 2021-02-02 | End: 2021-02-04

## 2021-02-02 RX ORDER — MULTIVIT-MIN/FERROUS GLUCONATE 9 MG/15 ML
5 LIQUID (ML) ORAL DAILY
Status: DISCONTINUED | OUTPATIENT
Start: 2021-02-03 | End: 2021-02-06 | Stop reason: HOSPADM

## 2021-02-02 RX ORDER — RUFINAMIDE 200 MG/1
1600 TABLET, FILM COATED ORAL EVERY 12 HOURS
Status: DISCONTINUED | OUTPATIENT
Start: 2021-02-02 | End: 2021-02-06 | Stop reason: HOSPADM

## 2021-02-02 RX ORDER — CLOBAZAM 10 MG/1
10 TABLET ORAL
Status: DISCONTINUED | OUTPATIENT
Start: 2021-02-02 | End: 2021-02-06 | Stop reason: HOSPADM

## 2021-02-02 RX ORDER — LORAZEPAM 2 MG/ML
1 INJECTION INTRAMUSCULAR EVERY 6 HOURS PRN
Status: DISCONTINUED | OUTPATIENT
Start: 2021-02-02 | End: 2021-02-06 | Stop reason: HOSPADM

## 2021-02-02 RX ORDER — HEPARIN SODIUM 5000 [USP'U]/ML
5000 INJECTION, SOLUTION INTRAVENOUS; SUBCUTANEOUS EVERY 8 HOURS SCHEDULED
Status: DISCONTINUED | OUTPATIENT
Start: 2021-02-02 | End: 2021-02-06 | Stop reason: HOSPADM

## 2021-02-02 RX ORDER — ACETAMINOPHEN 650 MG/1
650 SUPPOSITORY RECTAL ONCE
Status: COMPLETED | OUTPATIENT
Start: 2021-02-02 | End: 2021-02-02

## 2021-02-02 RX ADMIN — CEFTRIAXONE 1000 MG: 1 INJECTION, SOLUTION INTRAVENOUS at 19:18

## 2021-02-02 RX ADMIN — VANCOMYCIN HYDROCHLORIDE 500 MG: 5 INJECTION, POWDER, LYOPHILIZED, FOR SOLUTION INTRAVENOUS at 19:58

## 2021-02-02 RX ADMIN — CLOBAZAM 10 MG: 10 TABLET ORAL at 21:30

## 2021-02-02 RX ADMIN — SODIUM CHLORIDE 75 ML/HR: 0.9 INJECTION, SOLUTION INTRAVENOUS at 18:38

## 2021-02-02 RX ADMIN — SODIUM CHLORIDE 1000 ML: 0.9 INJECTION, SOLUTION INTRAVENOUS at 16:22

## 2021-02-02 RX ADMIN — RUFINAMIDE 1600 MG: 200 TABLET, FILM COATED ORAL at 21:26

## 2021-02-02 RX ADMIN — ACETAMINOPHEN 650 MG: 650 SUPPOSITORY RECTAL at 10:09

## 2021-02-02 RX ADMIN — IOHEXOL 100 ML: 350 INJECTION, SOLUTION INTRAVENOUS at 11:00

## 2021-02-02 RX ADMIN — SODIUM CHLORIDE 75 ML/HR: 0.9 INJECTION, SOLUTION INTRAVENOUS at 19:58

## 2021-02-02 RX ADMIN — HEPARIN SODIUM 5000 UNITS: 5000 INJECTION INTRAVENOUS; SUBCUTANEOUS at 21:55

## 2021-02-02 RX ADMIN — TOPIRAMATE 250 MG: 100 TABLET, FILM COATED ORAL at 21:29

## 2021-02-02 RX ADMIN — Medication 6 MG: at 21:56

## 2021-02-02 NOTE — ASSESSMENT & PLAN NOTE
Reports of multiple breakthrough seizures at the nursing home today  Will maintain seizure precautions  Give IV fluid hydration as the patient does appear dry  Monitor for other possible causes of breakthrough seizure  Consider increasing AM dose of Onfi as noted by neurology if no other trigger is identified

## 2021-02-02 NOTE — CASE MANAGEMENT
Pt is a resident at [facility]   I left message with Laura Johnston at [facility] to see if patient is A MA bedhold

## 2021-02-02 NOTE — PROGRESS NOTES
Vancomycin Assessment    Barbee Cowden is a 45 y o  female who is currently receiving vancomycin 500mg q12h for other SIRS   Relevant clinical data and objective history reviewed:  Creatinine   Date Value Ref Range Status   02/02/2021 0 63 0 60 - 1 30 mg/dL Final     Comment:     Standardized to IDMS reference method   08/02/2019 0 33 (L) 0 60 - 1 30 mg/dL Final     Comment:     Standardized to IDMS reference method   07/31/2019 0 56 (L) 0 60 - 1 30 mg/dL Final     Comment:     Standardized to IDMS reference method   04/01/2015 0 36 (L) 0 60 - 1 30 mg/dL Final     Comment:     Standardized to IDMS reference method   02/02/2015 0 51 (L) 0 60 - 1 30 mg/dL Final     Comment:     Standardized to IDMS reference method   10/31/2014 0 50 (L) 0 60 - 1 30 mg/dL Final     Comment:     Standardized to IDMS reference method     Vancomycin Rm   Date Value Ref Range Status   10/17/2017 11 1 ug/mL Final     /66   Pulse 101   Temp 98 4 °F (36 9 °C)   Resp 19   Ht 5' (1 524 m)   Wt 38 9 kg (85 lb 12 1 oz)   LMP  (LMP Unknown)   SpO2 94%   BMI 16 75 kg/m²   No intake/output data recorded  Lab Results   Component Value Date/Time    BUN 22 02/02/2021 09:48 AM    BUN 7 04/01/2015 09:35 PM    WBC 7 85 02/02/2021 09:48 AM    WBC 13 78 (H) 04/01/2015 09:35 PM    HGB 13 2 02/02/2021 09:48 AM    HGB 12 7 04/01/2015 09:35 PM    HCT 41 2 02/02/2021 09:48 AM    HCT 39 2 04/01/2015 09:35 PM     (H) 02/02/2021 09:48 AM     (H) 04/01/2015 09:35 PM     02/02/2021 09:48 AM     04/01/2015 09:35 PM     Temp Readings from Last 3 Encounters:   02/02/21 98 4 °F (36 9 °C)   12/06/20 98 2 °F (36 8 °C) (Temporal)   10/13/20 97 8 °F (36 6 °C)     Vancomycin Days of Therapy: 1    Assessment/Plan  The patient is currently on vancomycin utilizing scheduled dosing based on actual body weight  Baseline risks associated with therapy include: dehydration    The patient is currently receiving 500mg q12h and is clinically appropriate and dose will be continued  Pharmacy will also follow closely for s/sx of nephrotoxicity, infusion reactions, and appropriateness of therapy  BMP and CBC will be ordered per protocol  Plan for trough as patient approaches steady state, prior to the 4th  dose at approximately 0630 on 2/4/21  Due to infection severity, will target a trough of 15-20 (appropriate for most indications)   Pharmacy will continue to follow the patients culture results and clinical progress daily      Burke Bell, Pharmacist

## 2021-02-02 NOTE — ASSESSMENT & PLAN NOTE
Low grade fever noted with tachycardia  Also transient lactic acidosis, hypotension noted  Possible viral syndrome  Recent covid positive on 1/1/21  Taken off contact precautions at nursing home  CT chest, abdomen and pelvis is negative for acute infection  UA appears unremarkable  No distinct source of infection, but given limited history will order IV rocephin  Order procalcitonin, await blood culture results

## 2021-02-02 NOTE — ED PROVIDER NOTES
History  Chief Complaint   Patient presents with    Seizure - Prior Hx Of     patient presents from [facility]  she has had multiple recurrent seizures  Nursing home gave 1mg of ativan and EMS gave another 1mg dose in route  no seizure activey upon arrival           45-year-old female with past medical history of Lennox-Gastaut syndrome with recurrent seizures status post vagal nerve stimulator placement, remote history of anoxic brain injury, and dysphasia status post PEG tube placement who is presenting for evaluation of recurrent seizures  History unobtainable from the patient  Review of systems unobtainable from the patient  Per report from the nursing home and EMS staff, the patient started experiencing seizures at about 66 this morning  Patient was given 1 mg of sublingual Ativan at the nursing home  EMS was called and they gave an additional 1 mg of Ativan IV  Medical command was called and I authorized an additional dose of 2 mg of IV Ativan should this be necessary  However, the patient had no further seizures for EMS  The patient reportedly had 4 seizures in route  Patient was last seen by Epileptology on October 13th, 2020  Per Dr Nusrat Palencia note, the patient has frequent tonic seizures and her EEGs show both generalized and focal epilepsy  Her medication regimen is currently Briviact 75 mg every 12 hours, topiramate 250 mg twice daily, Banzel 400 mg twice daily, Epidiolex 400 mg twice daily, and Onfi 10 mg HS  Per staff the nursing home, no recent medication changes or missed medication doses  Last EEG in July 2019 demonstrated 5 tonic seizures and 42 subclinical runs of epileptic activity  Patient was diagnosed with COVID on January 2nd and was taken off COVID precautions about 1 week ago  She did not require inpatient admission for her COVID-19  Prior to Admission Medications   Prescriptions Last Dose Informant Patient Reported? Taking?    Brivaracetam (Briviact) 10 MG/ML SOLN   No Yes   Si 5 mL (75 mg total) by Per PEG Tube route every 12 (twelve) hours   MELATONIN PO 2021 at 2100 Outside Facility (Specify) Yes Yes   Si mg by Per G Tube route daily at bedtime   Multiple Vitamins-Minerals (MULTIVITAMIN WITH IRON-MINERALS) liquid  Outside Facility (Specify) Yes Yes   Si mL by Per G Tube route daily   Probiotic Product (ALEXANDER-BID PROBIOTIC PO)  Outside Facility (Specify) Yes Yes   Si tablet by Per G Tube route daily     acetaminophen (TYLENOL) 325 mg tablet   Yes Yes   Sig: Take 650 mg by mouth every 6 (six) hours as needed for mild pain or fever   bisacodyl (BISCOLAX) 10 mg suppository  Outside Facility (Specify) Yes Yes   Sig: Insert 10 mg into the rectum daily at bedtime as needed for constipation (if no BM day #4)   bisacodyl (FLEET BISACODYL) 10 MG/30ML ENEM   Yes Yes   Sig: Insert 10 mg into the rectum as needed for constipation Give at hs on day 5 if suppository is not effective   cannabidiol (Epidiolex) 100 mg/ml SOLN   No Yes   Si mL (400 mg total) by Per G Tube route 2 (two) times a day   cloBAZam (ONFI) 10 MG tablet   No Yes   Si tablet (10 mg total) by Per G Tube route daily at bedtime   diazepam (VALIUM) 5 MG/ML solution   No Yes   Sig: Give 1mL by PEG tube PRN generalized tonic clonic seizure longer than 5 minutes or continuous multiple tonic seizures over 30 minutes   magnesium hydroxide (MILK OF MAGNESIA) 400 mg/5 mL oral suspension  Outside Facility (Specify) Yes Yes   Sig: Take 30 mL by mouth daily as needed for constipation If not BM by day 3   rufinamide (BANZEL) 400 mg tablet   No Yes   Si tablets (1,600 mg total) by Per G Tube route every 12 (twelve) hours   topiramate (TOPAMAX) 50 MG tablet   No Yes   Si tablets (250 mg total) by Per G Tube route every 12 (twelve) hours      Facility-Administered Medications: None       Past Medical History:   Diagnosis Date    ADHD     Anoxic brain damage (HCC)     Autistic disorder     Breakthrough seizure ([facility] [address] ) 8/1/2019    Dysphagia     Dysphagia, oropharyngeal phase     [facility]     14 Fr as of 05/19/2020    Hyperammonemia (HCC)     Hyperkeratosis     Hypotension     Intellectual disability     Lennox-Gastaut syndrome with tonic seizures (HCC)     Lethargy     Liver enzyme elevation     Onychomycosis     Osteoporosis     Osteoporosis        Past Surgical History:   Procedure Laterality Date    ABDOMINAL SURGERY      CARDIAC PACEMAKER PLACEMENT      vns implant l chest    IR GASTROSTOMY TUBE PLACEMENT  11/21/2019    IR THORACENTESIS  12/17/2018    JEJUNOSTOMY FEEDING TUBE      history of - most recently, PEG tube    PEG TUBE PLACEMENT      ID IMP STIM,CRANIAL,SUBQ,1 ARRAY Left 12/18/2019    Procedure: REPLACEMENT IMPLANTABLE PULSE GENERATOR FOR VAGAL NERVE STIMULATOR, LEFT CHEST;  Surgeon: Kade Tidwell MD;  Location: [facility];  Service: Neurosurgery       History reviewed  No pertinent family history  I have reviewed and agree with the history as documented  E-Cigarette/Vaping    E-Cigarette Use Never User      E-Cigarette/Vaping Substances     Social History     Tobacco Use    Smoking status: Never Smoker    Smokeless tobacco: Never Used   Substance Use Topics    Alcohol use: Not Currently    Drug use: No       Review of Systems   Unable to perform ROS: Patient nonverbal       Physical Exam  Physical Exam  Vitals signs and nursing note reviewed  Constitutional:       General: She is not in acute distress  Appearance: She is well-developed  She is ill-appearing  Comments: Chronically ill-appearing female, no acute distress  HENT:      Head: Normocephalic and atraumatic  Right Ear: External ear normal       Left Ear: External ear normal    Eyes:      Conjunctiva/sclera: Conjunctivae normal       Pupils: Pupils are equal, round, and reactive to light  Cardiovascular:      Rate and Rhythm: Regular rhythm  Tachycardia present  Heart sounds: Normal heart sounds  No murmur  Pulmonary:      Effort: Pulmonary effort is normal  No respiratory distress  Breath sounds: Normal breath sounds  No wheezing or rales  Abdominal:      General: Bowel sounds are normal  There is no distension  Palpations: Abdomen is soft  Tenderness: There is no guarding  Comments: PEG tube in place  Musculoskeletal: Normal range of motion  General: No deformity  Skin:     General: Skin is warm and dry  Capillary Refill: Capillary refill takes less than 2 seconds  Neurological:      Mental Status: She is alert  Comments: Patient is somnolent  She does not follow commands  No obvious seizure-like activity on presentation     Psychiatric:      Comments: Unable to assess, patient nonverbal          Vital Signs  ED Triage Vitals [02/02/21 0929]   Temperature Pulse Respirations Blood Pressure SpO2   (!) 100 8 °F (38 2 °C) (!) 107 22 127/77 96 %      Temp Source Heart Rate Source Patient Position - Orthostatic VS BP Location FiO2 (%)   Rectal Monitor Lying Right arm --      Pain Score       --           Vitals:    02/02/21 1400 02/02/21 1421 02/02/21 1421 02/02/21 1553   BP: 95/64 (!) 87/66 (!) 87/66 100/66   Pulse: 104 102 101    Patient Position - Orthostatic VS:             Visual Acuity      ED Medications  Medications   LORazepam (FOR EMS ONLY) (ATIVAN) 2 mg/mL injection 2 mg (has no administration in time range)   sodium chloride 0 9 % infusion (has no administration in time range)   sodium chloride 0 9 % bolus 1,000 mL (1,000 mL Intravenous New Bag 2/2/21 1622)   acetaminophen (TYLENOL) rectal suppository 650 mg (650 mg Rectal Given 2/2/21 1009)   iohexol (OMNIPAQUE) 350 MG/ML injection (SINGLE-DOSE) 100 mL (100 mL Intravenous Given 2/2/21 1100)       Diagnostic Studies  Results Reviewed     Procedure Component Value Units Date/Time    Lactic acid 2 Hours [[id number]]  (Normal) Collected: 02/02/21 1306    Lab Status: Final result Specimen: Blood from Arm, Right Updated: 02/02/21 1331     LACTIC ACID 0 8 mmol/L     Narrative:      Result may be elevated if tourniquet was used during collection  CK (with reflex to MB) [[id number]]  (Normal) Collected: 02/02/21 0948    Lab Status: Final result Specimen: Blood from Arm, Right Updated: 02/02/21 1042     Total  U/L     TSH, 3rd generation with Free T4 reflex [[id number]]  (Normal) Collected: 02/02/21 0948    Lab Status: Final result Specimen: Blood from Arm, Right Updated: 02/02/21 1042     TSH 3RD GENERATON 1 974 uIU/mL     Narrative:      Patients undergoing fluorescein dye angiography may retain small amounts of fluorescein in the body for 48-72 hours post procedure  Samples containing fluorescein can produce falsely depressed TSH values  If the patient had this procedure,a specimen should be resubmitted post fluorescein clearance  Lactic acid, plasma [[id number]]  (Abnormal) Collected: 02/02/21 0948    Lab Status: Final result Specimen: Blood from Arm, Right Updated: 02/02/21 1039     LACTIC ACID 2 7 mmol/L     Narrative:      Result may be elevated if tourniquet was used during collection      Troponin I [[id number]]  (Normal) Collected: 02/02/21 0948    Lab Status: Final result Specimen: Blood from Arm, Right Updated: 02/02/21 1037     Troponin I 0 04 ng/mL     Comprehensive metabolic panel [[id number]]  (Abnormal) Collected: 02/02/21 0948    Lab Status: Final result Specimen: Blood from Arm, Right Updated: 02/02/21 1034     Sodium 144 mmol/L      Potassium 3 7 mmol/L      Chloride 108 mmol/L      CO2 22 mmol/L      ANION GAP 14 mmol/L      BUN 22 mg/dL      Creatinine 0 63 mg/dL      Glucose 159 mg/dL      Calcium 9 4 mg/dL      AST 53 U/L      ALT 95 U/L      Alkaline Phosphatase 121 U/L      Total Protein 8 2 g/dL      Albumin 3 7 g/dL      Total Bilirubin 0 30 mg/dL      eGFR 113 ml/min/1 73sq m     Narrative:      [place] guidelines for Chronic Kidney Disease (CKD):     Stage 1 with normal or high GFR (GFR > 90 mL/min/1 73 square meters)    Stage 2 Mild CKD (GFR = 60-89 mL/min/1 73 square meters)    Stage 3A Moderate CKD (GFR = 45-59 mL/min/1 73 square meters)    Stage 3B Moderate CKD (GFR = 30-44 mL/min/1 73 square meters)    Stage 4 Severe CKD (GFR = 15-29 mL/min/1 73 square meters)    Stage 5 End Stage CKD (GFR <15 mL/min/1 73 square meters)  Note: GFR calculation is accurate only with a steady state creatinine    UA w Reflex to Microscopic w Reflex to Culture [[id number]] Collected: 02/02/21 1010    Lab Status: Final result Specimen: Urine, Straight Cath Updated: 02/02/21 1020     Color, UA Yellow     Clarity, UA Slightly Cloudy     Specific [place], UA 1 020     pH, UA 6 5     Leukocytes, UA Negative     Nitrite, UA Negative     Protein, UA Negative mg/dl      Glucose, UA Negative mg/dl      Ketones, UA Negative mg/dl      Urobilinogen, UA 0 2 E U /dl      Bilirubin, UA Negative     Blood, UA Negative    CBC and differential [[id number]]  (Abnormal) Collected: 02/02/21 0948    Lab Status: Final result Specimen: Blood from Arm, Right Updated: 02/02/21 1018     WBC 7 85 Thousand/uL      RBC 4 08 Million/uL      Hemoglobin 13 2 g/dL      Hematocrit 41 2 %       fL      MCH 32 4 pg      MCHC 32 0 g/dL      RDW 12 9 %      MPV 12 5 fL      Platelets 644 Thousands/uL      nRBC 0 /100 WBCs      Neutrophils Relative 83 %      Immat GRANS % 0 %      Lymphocytes Relative 10 %      Monocytes Relative 6 %      Eosinophils Relative 0 %      Basophils Relative 1 %      Neutrophils Absolute 6 56 Thousands/µL      Immature Grans Absolute 0 02 Thousand/uL      Lymphocytes Absolute 0 75 Thousands/µL      Monocytes Absolute 0 46 Thousand/µL      Eosinophils Absolute 0 02 Thousand/µL      Basophils Absolute 0 04 Thousands/µL     Blood culture #2 [[id number]] Collected: 02/02/21 0948    Lab Status:  In process Specimen: Blood from Arm, Right Updated: 02/02/21 1015    Blood culture #1 [[id number]] Collected: 02/02/21 1001    Lab Status: In process Specimen: Blood from Arm, Left Updated: 02/02/21 1015                 CT chest abdomen pelvis w contrast   Final Result by  (02/02 1631)   Addendum 1 of 1 by Eulalia Clements MD (02/02 1418)   ADDENDUM:      2 cm left thyroid nodule meets the size threshold criteria for further    evaluation with ultrasound   Ultrasound thyroid on nonemergent basis suggested      Follow-up notification has been created for this addendum      Final      XR chest 1 view portable   Final Result by Carrington Aguirre MD (02/02 1008)      No acute cardiopulmonary disease  Workstation performed: [id number]                    Procedures  Procedures         ED Course  ED Course as of Feb 02 1631   Tue Feb 02, 2021   7983 Attempted to call patient's mother/father at both the home telephone number and mobile telephone number provided  No answer  Left a message requesting a call back as soon as possible  0948 Temperature(!): 100 8 °F (38 2 °C)   0948 Pulse(!): 107   1009 Spoke with Dr Rodri Brooks from Epileptology  We discussed the case  He stated that it would not be realistic to completely terminate all seizure activity as the patient has extremely frequent seizures at baseline  Recommended treating patient with the goal of returning her back to baseline  He suggested possibly increasing Onfi to 5 mg in the morning and 10 mg at night  He recommended against intubation and general anesthesia with propofol for seizure control unless the patient were to develop airway compromise or another reason for intubation  He stated that fever could potentially cause the worsening of the patient's seizure activity  1011 No acute abnormality  XR chest 1 view portable   1014 EKG interpreted by me  Sinus tachycardia at 108 beats per minute  Normal axis  Normal conduction  No ectopy  No T-wave inversions    No ST elevation or depression  1031 Leukocytes, UA: Negative   1031 Nitrite, UA: Negative   1031 Blood, UA: Negative   1031 WBC: 7 85   1031 Hemoglobin: 13 2   1036 CO2: 22   1036 AST(!): 53   1036 ALT(!): 95   1036 Alkaline Phosphatase(!): 121   1038 Troponin I: 0 04   1053 LACTIC ACID(!!): 2 7   1054 Total CK: 107   1054 TSH 3RD GENERATON: 1 974   1211 No acute abnormality  No source for fever  CT chest abdomen pelvis w contrast   1216 Message sent to [facility] for admission  Will discuss possible need for antibiotics with them prior to giving antibiotics  1216 Patient is sleeping comfortably  No further seizure-like activity  [address] Dr Albertina Powell requested that I speak with Dr Ethan Huertas again to make sure that he would be okay with keeping the patient here at [facility]  Message sent to Dr Ethan Huertas  [address] with Dr Ethan Huertas again  The patient has not had any further seizure-like activity since she arrived here  He stated that because she was stable, she could be observed here  If she were to have worsening of her seizures, transfer could be considered at that time  80 Called patient's parents to update them regarding her plan of care  Left a message on the home voicemail  SBIRT 22yo+      Most Recent Value   SBIRT (24 yo +)   In order to provide better care to our patients, we are screening all of our patients for alcohol and drug use  Would it be okay to ask you these screening questions? Unable to answer at this time Filed at: 02/02/2021 1016   Initial Alcohol Screen: US AUDIT-C    1  How often do you have a drink containing alcohol?  0 Filed at: 02/02/2021 1016   2  How many drinks containing alcohol do you have on a typical day you are drinking? 0 Filed at: 02/02/2021 1016   3a  Male UNDER 65: How often do you have five or more drinks on one occasion? 0 Filed at: 02/02/2021 1016   3b  FEMALE Any Age, or MALE 65+:  How often do you have 4 or more drinks on one occassion?  0 Filed at: 02/02/2021 1016   Audit-C Score  0 Filed at: 02/02/2021 1016                    MDM  Number of Diagnoses or Management Options  Breakthrough seizure ([facility] [address] ): new and requires workup  Fever: new and requires workup  Intractable Lennox-Gastaut syndrome without status epilepticus ([facility] [address] ): established and worsening  Nonverbal: established and worsening  Diagnosis management comments:     As above, patient presented with episodes of breakthrough seizures  The patient was noted to have a history of Lennox-Gastaut syndrome and to have frequent seizures at baseline per recent Epileptology notes  No recent changes had been made to her medication regimen  She was recently diagnosed with COVID-19 but came off precautions about 1 week ago  On arrival, the patient was at her baseline mental status  She was noted to be febrile and mildly tachycardic  Remainder of physical examination was unremarkable  Labs were ordered and demonstrated slight elevation in LFTs and slight elevation in lactic acid  Lactic acid normalized without intervention  Workup for fever was unrevealing as to the cause  No indication for repeat COVID testing given that the patient had just been diagnosed with COVID 1 month ago  Chest x-ray did not show any abnormalities  Urinalysis was normal as was CT chest, abdomen, and pelvis  Antibiotics were withheld as there was no clear source for the fever  The case was discussed with Dr Adrian Gutierrez multiple times  He stated that the patient had frequent seizures at baseline and that completely terminating the seizures would not be realistic  He recommended treating the patient with a goal of returning her to baseline  Ultimately, he stated that if the patient remained stable and had no further seizures, he would not see any indication for transfer  He also recommended against intubating for seizure control unless the patient were to develop another reason for intubation    Patient had no further seizure episodes during her ER course  She remained at her baseline mental status  This was discussed with Dr Dena Trotter from [facility] who was agreeable to admit the patient here  The patient's parents were updated regarding her plan of care  Amount and/or Complexity of Data Reviewed  Clinical lab tests: ordered and reviewed  Tests in the radiology section of CPT®: ordered and reviewed  Decide to obtain previous medical records or to obtain history from someone other than the patient: yes  Obtain history from someone other than the patient: yes  Review and summarize past medical records: yes  Discuss the patient with other providers: yes  Independent visualization of images, tracings, or specimens: yes    Risk of Complications, Morbidity, and/or Mortality  Presenting problems: moderate  Diagnostic procedures: minimal  Management options: minimal    Patient Progress  Patient progress: improved      Disposition  Final diagnoses:   Breakthrough seizure ([address] )   Intractable Lennox-Gastaut syndrome without status epilepticus ([address] )   Fever   Nonverbal     Time reflects when diagnosis was documented in both MDM as applicable and the Disposition within this note     Time User Action Codes Description Comment    2/2/2021  1:26 PM Eleonora Dominguez Add [G40 919] Breakthrough seizure ([address] )     2/2/2021  1:26 PM Eleonora Burns [G40 814] Intractable Lennox-Gastaut syndrome without status epilepticus ([address] )     2/2/2021  1:26 PM Eleonora Burns [R50 9] Fever     2/2/2021  1:26 PM Eleonora Burns [R47 01] Nonverbal       ED Disposition     ED Disposition Condition Date/Time Comment    Admit Fair Tue Feb 2, 2021  1:26 PM Case was discussed with FERNANDO and the patient's admission status was agreed to be Admission Status: inpatient status to the service of Dr Dena Trotter          Follow-up Information    None         Current Discharge Medication List      CONTINUE these medications which have NOT CHANGED    Details acetaminophen (TYLENOL) 325 mg tablet Take 650 mg by mouth every 6 (six) hours as needed for mild pain or fever      bisacodyl (BISCOLAX) 10 mg suppository Insert 10 mg into the rectum daily at bedtime as needed for constipation (if no BM day #4)      bisacodyl (FLEET BISACODYL) 10 MG/30ML ENEM Insert 10 mg into the rectum as needed for constipation Give at hs on day 5 if suppository is not effective      Brivaracetam (Briviact) 10 MG/ML SOLN 7 5 mL (75 mg total) by Per PEG Tube route every 12 (twelve) hours  Qty: 450 mL, Refills: 5    Comments: Please discontinue prior prescription for Briviact 10mL per dose instruction  Associated Diagnoses: Lennox-Gastaut syndrome with tonic seizures (HCC)      cannabidiol (Epidiolex) 100 mg/ml SOLN 4 mL (400 mg total) by Per G Tube route 2 (two) times a day  Qty: 240 mL, Refills: 5    Associated Diagnoses: Lennox-Gastaut syndrome with tonic seizures (HCC)      cloBAZam (ONFI) 10 MG tablet 1 tablet (10 mg total) by Per G Tube route daily at bedtime  Qty: 30 tablet, Refills: 5    Associated Diagnoses: Lennox-Gastaut syndrome with tonic seizures (HCC)      diazepam (VALIUM) 5 MG/ML solution Give 1mL by PEG tube PRN generalized tonic clonic seizure longer than 5 minutes or continuous multiple tonic seizures over 30 minutes  Qty: 30 mL, Refills: 0    Associated Diagnoses: Lennox-Gastaut syndrome with tonic seizures (HCC)      magnesium hydroxide (MILK OF MAGNESIA) 400 mg/5 mL oral suspension Take 30 mL by mouth daily as needed for constipation If not BM by day 3      MELATONIN PO 6 mg by Per G Tube route daily at bedtime      Multiple Vitamins-Minerals (MULTIVITAMIN WITH IRON-MINERALS) liquid 5 mL by Per G Tube route daily      Probiotic Product (ALEXANDER-BID PROBIOTIC PO) 1 tablet by Per G Tube route daily        rufinamide (BANZEL) 400 mg tablet 4 tablets (1,600 mg total) by Per G Tube route every 12 (twelve) hours  Qty: 240 tablet, Refills: 5    Associated Diagnoses: Lennox-Gastaut syndrome with tonic seizures (HCC)      topiramate (TOPAMAX) 50 MG tablet 5 tablets (250 mg total) by Per G Tube route every 12 (twelve) hours  Qty: 300 tablet, Refills: 5    Associated Diagnoses: Lennox-Gastaut syndrome with tonic seizures ([facility] [address] )           No discharge procedures on file      PDMP Review       Value Time User    PDMP Reviewed  Yes 10/13/2020 10:20 AM uS Lagunas MD          ED Provider  Electronically Signed by           Isabell Leiva MD  02/02/21 6117

## 2021-02-02 NOTE — ASSESSMENT & PLAN NOTE
Likely due to sedative effects of IV ativan given for seizure activity  Will need continuously heart rate and pulse oximetry monitoring on Alexis

## 2021-02-02 NOTE — PLAN OF CARE
Problem: Potential for Falls  Goal: Patient will remain free of falls  Description: INTERVENTIONS:  - Assess patient frequently for physical needs  -  Identify cognitive and physical deficits and behaviors that affect risk of falls    -  [place] fall precautions as indicated by assessment   - Educate patient/family on patient safety including physical limitations  - Instruct patient to call for assistance with activity based on assessment  - Modify environment to reduce risk of injury  - Consider OT/PT consult to assist with strengthening/mobility  Outcome: Progressing     Problem: Prexisting or High Potential for Compromised Skin Integrity  Goal: Skin integrity is maintained or improved  Description: INTERVENTIONS:  - Identify patients at risk for skin breakdown  - Assess and monitor skin integrity  - Assess and monitor nutrition and hydration status  - Monitor labs   - Assess for incontinence   - Turn and reposition patient  - Assist with mobility/ambulation  - Relieve pressure over bony prominences  - Avoid friction and shearing  - Provide appropriate hygiene as needed including keeping skin clean and dry  - Evaluate need for skin moisturizer/barrier cream  - Collaborate with interdisciplinary team   - Patient/family teaching  - Consider wound care consult   Outcome: Progressing     Problem: PAIN - ADULT  Goal: Verbalizes/displays adequate comfort level or baseline comfort level  Description: Interventions:  - Encourage patient to monitor pain and request assistance  - Assess pain using appropriate pain scale  - Administer analgesics based on type and severity of pain and evaluate response  - Implement non-pharmacological measures as appropriate and evaluate response  - Consider cultural and social influences on pain and pain management  - Notify physician/advanced practitioner if interventions unsuccessful or patient reports new pain  Outcome: Progressing     Problem: INFECTION - ADULT  Goal: Absence or prevention of progression during hospitalization  Description: INTERVENTIONS:  - Assess and monitor for signs and symptoms of infection  - Monitor lab/diagnostic results  - Monitor all insertion sites, i e  indwelling lines, tubes, and drains  - Administer medications as ordered  - Instruct and encourage patient and family to use good hand hygiene technique  - Identify and instruct in appropriate isolation precautions for identified infection/condition  Outcome: Progressing     Problem: SAFETY ADULT  Goal: Patient will remain free of falls  Description: INTERVENTIONS:  - Assess patient frequently for physical needs  -  Identify cognitive and physical deficits and behaviors that affect risk of falls    -  [place] fall precautions as indicated by assessment   - Educate patient/family on patient safety including physical limitations  - Instruct patient to call for assistance with activity based on assessment  - Modify environment to reduce risk of injury  - Consider OT/PT consult to assist with strengthening/mobility  Outcome: Progressing     Problem: DISCHARGE PLANNING  Goal: Discharge to home or other facility with appropriate resources  Description: INTERVENTIONS:  - Identify barriers to discharge w/patient and caregiver  - Arrange for needed discharge resources and transportation as appropriate  - Identify discharge learning needs (meds, wound care, etc )  - Refer to Case Management Department for coordinating discharge planning if the patient needs post-hospital services based on physician/advanced practitioner order or complex needs related to functional status, cognitive ability, or social support system  Outcome: Progressing

## 2021-02-02 NOTE — H&P
H&P- Benita Sommer FSVEBN 1980, 45 y o  female MRN: [id number]    Unit/Bed#: 403-01 Encounter: [id number]    Primary Care Provider: Frankey Hidden,    Date and time admitted to hospital: 2/2/2021  9:23 AM      Intractable epilepsy with status epilepticus [facility])  Assessment & Plan  Reports of multiple breakthrough seizures at the nursing home today  Will maintain seizure precautions  Give IV fluid hydration as the patient does appear dry  Monitor for other possible causes of breakthrough seizure  Consider increasing AM dose of Onfi as noted by neurology if no other trigger is identified  SIRS (systemic inflammatory response syndrome) (HCC)  Assessment & Plan  Low grade fever noted with tachycardia  Also transient lactic acidosis, hypotension noted  Possible viral syndrome  Recent covid positive on 1/1/21  Taken off contact precautions at nursing home  CT chest, abdomen and pelvis is negative for acute infection  UA appears unremarkable  No distinct source of infection, but given limited history will order IV rocephin  Order procalcitonin, await blood culture results  Low blood pressure  Assessment & Plan  Likely due to IV ativan  Will monitor closely and continue IV fluid hydration  Somnolence  Assessment & Plan  Likely due to sedative effects of IV ativan given for seizure activity  Will need continuously heart rate and pulse oximetry monitoring on Alexis  Lennox-Gastaut syndrome [facility])  Assessment & Plan  Patient is nonverbal at baseline  PEG tube for feedings and medications  VTE Prophylaxis: Heparin  / sequential compression device   Code Status: full code - discussed with family  Anticipated Length of Stay:  Patient will be admitted on an Inpatient basis with an anticipated length of stay of  Greater than 2 midnights  Justification for Hospital Stay: need for close monitoring in the setting of recent fever      Total Time for Visit, including Counseling / Coordination of Care: 1 hour  Greater than 50% of this total time spent on direct patient counseling and coordination of care  Chief Complaint:   Fever, seizure    History of Present Illness:    Rosana Santoro is a 45 y o  female with a medical history of Lennox-Gastaut syndrome with recurrent seizures status post vagal nerve stimulator placement who presents with increased seizures beginning at approximately [phone] this morning at the nursing home  There been no recent medication changes  She was diagnosed with COVID January 2nd but recovered well and taken off COVID precautions 1 week ago, not requiring inpatient admission  There are no reports of any abnormal symptoms such as cough, nausea or vomiting  Of low-grade fever was recorded on arrival to the ER with a temp of 100 8°  Patient is nonverbal at baseline and NPO with PEG tube for nutrition  Review of Systems:  Review of Systems   All other systems reviewed and are negative          Past Medical and Surgical History:   Past Medical History:   Diagnosis Date    ADHD     Anoxic brain damage ([facility] [address] )     Autistic disorder     Breakthrough seizure ([facility] [address] ) 8/1/2019    Dysphagia     Dysphagia, oropharyngeal phase     Gastrostomy tube dependent (HCC)     14 Fr as of 05/19/2020    Hyperammonemia (HCC)     Hyperkeratosis     Hypotension     Intellectual disability     Lennox-Gastaut syndrome with tonic seizures (HCC)     Lethargy     Liver enzyme elevation     Onychomycosis     Osteoporosis     Osteoporosis        Past Surgical History:   Procedure Laterality Date    ABDOMINAL SURGERY      CARDIAC PACEMAKER PLACEMENT      vns implant l chest    IR GASTROSTOMY TUBE PLACEMENT  11/21/2019    IR THORACENTESIS  12/17/2018    JEJUNOSTOMY FEEDING TUBE      history of - most recently, PEG tube    PEG TUBE PLACEMENT      MN IMP STIM,CRANIAL,SUBQ,1 ARRAY Left 12/18/2019    Procedure: REPLACEMENT IMPLANTABLE PULSE GENERATOR FOR VAGAL NERVE STIMULATOR, LEFT CHEST;  Surgeon: Linh Abreu MD;  Location: [facility];  Service: Neurosurgery       Meds/Allergies:  Prior to Admission medications    Medication Sig Start Date End Date Taking?  Authorizing Provider   Brivaracetam (Briviact) 10 MG/ML SOLN 7 5 mL (75 mg total) by Per PEG Tube route every 12 (twelve) hours 10/13/20  Yes Alexandra Santos MD   cannabidiol (Epidiolex) 100 mg/ml SOLN 4 mL (400 mg total) by Per G Tube route 2 (two) times a day 10/5/20  Yes Alexandra Santos MD   cloBAZam (ONFI) 10 MG tablet 1 tablet (10 mg total) by Per G Tube route daily at bedtime 10/13/20  Yes Alexandra Santos MD   diazepam (VALIUM) 5 MG/ML solution Give 1mL by PEG tube PRN generalized tonic clonic seizure longer than 5 minutes or continuous multiple tonic seizures over 30 minutes 10/13/20  Yes Alexandra Santos MD   melatonin 3 mg 6 mg by Per G Tube route daily at bedtime   Yes Historical Provider, MD   MELATONIN PO 6 mg by Per G Tube route daily at bedtime   Yes Historical Provider, MD   Multiple Vitamins-Minerals (MULTIVITAMIN WITH IRON-MINERALS) liquid 5 mL by Per G Tube route daily   Yes Historical Provider, MD   Probiotic Product (ALEXANDER-BID PROBIOTIC PO) 1 tablet by Per G Tube route daily     Yes Historical Provider, MD   rufinamide (BANZEL) 400 mg tablet 4 tablets (1,600 mg total) by Per G Tube route every 12 (twelve) hours 10/13/20  Yes Alexandra Santos MD   topiramate (TOPAMAX) 50 MG tablet 5 tablets (250 mg total) by Per G Tube route every 12 (twelve) hours 10/13/20  Yes Alexandra Santos MD   acetaminophen (TYLENOL) 325 mg tablet Take 650 mg by mouth every 6 (six) hours as needed for mild pain or fever    Historical Provider, MD   bisacodyl (BISCOLAX) 10 mg suppository Insert 10 mg into the rectum daily at bedtime as needed for constipation (if no BM day #4)    Historical Provider, MD   bisacodyl (FLEET BISACODYL) 10 MG/30ML ENEM Insert 10 mg into the rectum as needed for constipation Give at hs on day 5 if suppository is not effective    Historical Provider, MD   magnesium hydroxide (MILK OF MAGNESIA) 400 mg/5 mL oral suspension Take 30 mL by mouth daily as needed for constipation If not BM by day 3    Historical Provider, MD     I have reviewed home medications with patient personally  Allergies: Allergies   Allergen Reactions    Felbamate        Social History:  Marital Status: Single   Substance Use History:   Social History     Substance and Sexual Activity   Alcohol Use Not Currently     Social History     Tobacco Use   Smoking Status Never Smoker   Smokeless Tobacco Never Used     Social History     Substance and Sexual Activity   Drug Use No       Family History:  non-contributory    Physical Exam:   Vitals:   Blood Pressure: 100/66 (02/02/21 1553)  Pulse: 101 (02/02/21 1421)  Temperature: 98 4 °F (36 9 °C) (02/02/21 1421)  Temp Source: Rectal (02/02/21 1223)  Respirations: 19 (02/02/21 1421)  Height: 5' (152 4 cm) (02/02/21 1421)  Weight - Scale: 38 9 kg (85 lb 12 1 oz) (02/02/21 1421)  SpO2: 94 % (02/02/21 1421)    Physical Exam  Vitals signs and nursing note reviewed  Constitutional:       General: She is sleeping  Appearance: She is underweight  She is not ill-appearing  Interventions: She is sedated  Comments: Sedation effects of ativan noted  HENT:      Head: Normocephalic  Mouth/Throat:      Mouth: Mucous membranes are moist    Cardiovascular:      Rate and Rhythm: Normal rate and regular rhythm  Heart sounds: No murmur  Pulmonary:      Effort: Pulmonary effort is normal       Breath sounds: Normal breath sounds  No wheezing  Abdominal:      General: Abdomen is flat  Musculoskeletal:      Right lower leg: No edema  Left lower leg: No edema  Skin:     General: Skin is warm and dry  Neurological:      Mental Status: She is oriented to person, place, and time  She is lethargic     Psychiatric:         Mood and Affect: Mood normal            Additional Data: Lab Results: I have personally reviewed pertinent reports  Results from last 7 days   Lab Units 02/02/21  0948   WBC Thousand/uL 7 85   HEMOGLOBIN g/dL 13 2   HEMATOCRIT % 41 2   PLATELETS Thousands/uL 161   NEUTROS PCT % 83*   LYMPHS PCT % 10*   MONOS PCT % 6   EOS PCT % 0     Results from last 7 days   Lab Units 02/02/21  0948   SODIUM mmol/L 144   POTASSIUM mmol/L 3 7   CHLORIDE mmol/L 108   CO2 mmol/L 22   BUN mg/dL 22   CREATININE mg/dL 0 63   ANION GAP mmol/L 14*   CALCIUM mg/dL 9 4   ALBUMIN g/dL 3 7   TOTAL BILIRUBIN mg/dL 0 30   ALK PHOS U/L 121*   ALT U/L 95*   AST U/L 53*   GLUCOSE RANDOM mg/dL 159*                 Results from last 7 days   Lab Units 02/02/21  1306 02/02/21  0948   LACTIC ACID mmol/L 0 8 2 7*       Imaging: I have personally reviewed pertinent reports  CT chest abdomen pelvis w contrast   Final Result by  (02/02 5073)   Addendum 1 of 1 by Aron Hamman, MD (02/02 1418)   ADDENDUM:      2 cm left thyroid nodule meets the size threshold criteria for further    evaluation with ultrasound   Ultrasound thyroid on nonemergent basis suggested      Follow-up notification has been created for this addendum      Final      XR chest 1 view portable   Final Result by Jun Recinos MD (02/02 1008)      No acute cardiopulmonary disease  Workstation performed: [id number]               [facility] / [facility] Records Reviewed: Yes     ** Please Note: This note has been constructed using a voice recognition system   **

## 2021-02-03 LAB
ANION GAP SERPL CALCULATED.3IONS-SCNC: 10 MMOL/L (ref 4–13)
ATRIAL RATE: 108 BPM
BASOPHILS # BLD AUTO: 0.05 THOUSANDS/ΜL (ref 0–0.1)
BASOPHILS NFR BLD AUTO: 1 % (ref 0–1)
BUN SERPL-MCNC: 8 MG/DL (ref 5–25)
CALCIUM SERPL-MCNC: 8.7 MG/DL (ref 8.3–10.1)
CHLORIDE SERPL-SCNC: 107 MMOL/L (ref 100–108)
CO2 SERPL-SCNC: 20 MMOL/L (ref 21–32)
CREAT SERPL-MCNC: 0.38 MG/DL (ref 0.6–1.3)
EOSINOPHIL # BLD AUTO: 0.1 THOUSAND/ΜL (ref 0–0.61)
EOSINOPHIL NFR BLD AUTO: 2 % (ref 0–6)
ERYTHROCYTE [DISTWIDTH] IN BLOOD BY AUTOMATED COUNT: 13 % (ref 11.6–15.1)
GFR SERPL CREATININE-BSD FRML MDRD: 134 ML/MIN/1.73SQ M
GLUCOSE SERPL-MCNC: 104 MG/DL (ref 65–140)
HCT VFR BLD AUTO: 37.6 % (ref 34.8–46.1)
HGB BLD-MCNC: 11.9 G/DL (ref 11.5–15.4)
IMM GRANULOCYTES # BLD AUTO: 0.01 THOUSAND/UL (ref 0–0.2)
IMM GRANULOCYTES NFR BLD AUTO: 0 % (ref 0–2)
LYMPHOCYTES # BLD AUTO: 1.83 THOUSANDS/ΜL (ref 0.6–4.47)
LYMPHOCYTES NFR BLD AUTO: 34 % (ref 14–44)
MCH RBC QN AUTO: 32.1 PG (ref 26.8–34.3)
MCHC RBC AUTO-ENTMCNC: 31.6 G/DL (ref 31.4–37.4)
MCV RBC AUTO: 101 FL (ref 82–98)
MONOCYTES # BLD AUTO: 0.53 THOUSAND/ΜL (ref 0.17–1.22)
MONOCYTES NFR BLD AUTO: 10 % (ref 4–12)
NEUTROPHILS # BLD AUTO: 2.91 THOUSANDS/ΜL (ref 1.85–7.62)
NEUTS SEG NFR BLD AUTO: 53 % (ref 43–75)
NRBC BLD AUTO-RTO: 0 /100 WBCS
P AXIS: 81 DEGREES
PLATELET # BLD AUTO: 154 THOUSANDS/UL (ref 149–390)
PMV BLD AUTO: 12.1 FL (ref 8.9–12.7)
POTASSIUM SERPL-SCNC: 3.5 MMOL/L (ref 3.5–5.3)
PR INTERVAL: 134 MS
PROCALCITONIN SERPL-MCNC: <0.05 NG/ML
QRS AXIS: 92 DEGREES
QRSD INTERVAL: 68 MS
QT INTERVAL: 332 MS
QTC INTERVAL: 444 MS
RBC # BLD AUTO: 3.71 MILLION/UL (ref 3.81–5.12)
SODIUM SERPL-SCNC: 137 MMOL/L (ref 136–145)
T WAVE AXIS: 79 DEGREES
VENTRICULAR RATE: 108 BPM
WBC # BLD AUTO: 5.43 THOUSAND/UL (ref 4.31–10.16)

## 2021-02-03 PROCEDURE — 99232 SBSQ HOSP IP/OBS MODERATE 35: CPT | Performed by: PHYSICIAN ASSISTANT

## 2021-02-03 PROCEDURE — 85025 COMPLETE CBC W/AUTO DIFF WBC: CPT | Performed by: PHYSICIAN ASSISTANT

## 2021-02-03 PROCEDURE — 93010 ELECTROCARDIOGRAM REPORT: CPT | Performed by: INTERNAL MEDICINE

## 2021-02-03 PROCEDURE — G0425 INPT/ED TELECONSULT30: HCPCS | Performed by: PSYCHIATRY & NEUROLOGY

## 2021-02-03 PROCEDURE — 80048 BASIC METABOLIC PNL TOTAL CA: CPT | Performed by: PHYSICIAN ASSISTANT

## 2021-02-03 RX ORDER — ERYTHROMYCIN 5 MG/G
0.5 OINTMENT OPHTHALMIC EVERY 6 HOURS SCHEDULED
Status: DISCONTINUED | OUTPATIENT
Start: 2021-02-03 | End: 2021-02-06 | Stop reason: HOSPADM

## 2021-02-03 RX ORDER — CLOBAZAM 10 MG/1
5 TABLET ORAL EVERY MORNING
Status: DISCONTINUED | OUTPATIENT
Start: 2021-02-03 | End: 2021-02-06 | Stop reason: HOSPADM

## 2021-02-03 RX ADMIN — MULTIVITAMIN 5 ML: LIQUID ORAL at 08:58

## 2021-02-03 RX ADMIN — HEPARIN SODIUM 5000 UNITS: 5000 INJECTION INTRAVENOUS; SUBCUTANEOUS at 13:08

## 2021-02-03 RX ADMIN — RUFINAMIDE 1600 MG: 200 TABLET, FILM COATED ORAL at 08:58

## 2021-02-03 RX ADMIN — HEPARIN SODIUM 5000 UNITS: 5000 INJECTION INTRAVENOUS; SUBCUTANEOUS at 21:18

## 2021-02-03 RX ADMIN — ERYTHROMYCIN 0.5 INCH: 5 OINTMENT OPHTHALMIC at 18:50

## 2021-02-03 RX ADMIN — VANCOMYCIN HYDROCHLORIDE 500 MG: 5 INJECTION, POWDER, LYOPHILIZED, FOR SOLUTION INTRAVENOUS at 19:45

## 2021-02-03 RX ADMIN — TOPIRAMATE 250 MG: 100 TABLET, FILM COATED ORAL at 08:56

## 2021-02-03 RX ADMIN — HEPARIN SODIUM 5000 UNITS: 5000 INJECTION INTRAVENOUS; SUBCUTANEOUS at 06:36

## 2021-02-03 RX ADMIN — CLOBAZAM 10 MG: 10 TABLET ORAL at 21:18

## 2021-02-03 RX ADMIN — VANCOMYCIN HYDROCHLORIDE 500 MG: 5 INJECTION, POWDER, LYOPHILIZED, FOR SOLUTION INTRAVENOUS at 09:00

## 2021-02-03 RX ADMIN — ERYTHROMYCIN 0.5 INCH: 5 OINTMENT OPHTHALMIC at 23:22

## 2021-02-03 RX ADMIN — BRIVARACETAM 75 MG: 10 SOLUTION ORAL at 18:56

## 2021-02-03 RX ADMIN — CLOBAZAM 5 MG: 10 TABLET ORAL at 08:57

## 2021-02-03 RX ADMIN — TOPIRAMATE 250 MG: 100 TABLET, FILM COATED ORAL at 21:18

## 2021-02-03 RX ADMIN — RUFINAMIDE 1600 MG: 200 TABLET, FILM COATED ORAL at 21:18

## 2021-02-03 RX ADMIN — CEFTRIAXONE 1000 MG: 1 INJECTION, SOLUTION INTRAVENOUS at 19:02

## 2021-02-03 RX ADMIN — ERYTHROMYCIN 0.5 INCH: 5 OINTMENT OPHTHALMIC at 12:29

## 2021-02-03 NOTE — PROGRESS NOTES
Progress Note - Polina López 1980, 45 y o  female MRN: [id number]    Unit/Bed#: 403-01 Encounter: [id number]    Primary Care Provider: Clive No DO   Date and time admitted to hospital: 2/2/2021  9:23 AM        * Intractable epilepsy with status epilepticus [facility])  Assessment & Plan  Reports of multiple breakthrough seizures at the nursing home on day of admission  Will maintain seizure precautions  Give IV fluid hydration as the patient does appear dry  Monitor for other possible causes of breakthrough seizure  Continue Onfi 10 mg hs and add 5 mg qam as noted by neurology  Neurology consult appreciated- no further inpatient neurologic workup recommended at this time,  will continue infectious workup  SIRS (systemic inflammatory response syndrome) (HCC)  Assessment & Plan  Low grade fever noted with tachycardia  Also transient lactic acidosis, hypotension noted  Possible viral syndrome  Recent covid positive on 1/1/21  Taken off contact precautions at nursing home  CT chest, abdomen and pelvis is negative for acute infection  UA appears unremarkable  No distinct source of infection, but given limited history the patient was initiated on IV rocephin  Procalcitonin pending, await blood culture results  Low blood pressure  Assessment & Plan  Possibley due to IV ativan  Blood pressure continues to be low normal  Will monitor closely and continue IV fluid hydration  Somnolence  Assessment & Plan  Likely due to sedative effects of IV ativan given for seizure activity  Will need continuously heart rate and pulse oximetry monitoring on Alexis  Lennox-Gastaut syndrome [facility])  Assessment & Plan  Patient is nonverbal at baseline  PEG tube for feedings and medications  VTE Pharmacologic Prophylaxis:   Pharmacologic: Heparin  Mechanical VTE Prophylaxis in Place: Yes    Patient Centered Rounds: I have performed bedside rounds with nursing staff today      Discussions with Specialists or Other Care Team Provider: nursing, CM, neurology    Education and Discussions with Family / Patient: father updated via telephone    Time Spent for Care: 20 minutes  More than 50% of total time spent on counseling and coordination of care as described above  Current Length of Stay: 1 day(s)    Current Patient Status: Inpatient   Certification Statement: The patient will continue to require additional inpatient hospital stay due to continued workup for fever, monitoring of seizures  Discharge Plan: TBD    Code Status: Level 1 - Full Code      Subjective: The patient was seen and examined  The patient is sleeping but awaken with stimuli  The patient quickly returned to sleep and did not answer any questions  Objective:     Vitals:   Temp (24hrs), Av 4 °F (36 9 °C), Min:98 3 °F (36 8 °C), Max:98 4 °F (36 9 °C)    Temp:  [98 3 °F (36 8 °C)-98 4 °F (36 9 °C)] 98 4 °F (36 9 °C)  HR:  [78] 78  Resp:  [18] 18  BP: ()/(54-66) 94/54  SpO2:  [97 %-100 %] 97 %  Body mass index is 16 75 kg/m²  Input and Output Summary (last 24 hours): Intake/Output Summary (Last 24 hours) at 2/3/2021 1448  Last data filed at 2021 1837  Gross per 24 hour   Intake 1000 ml   Output --   Net 1000 ml       Physical Exam:     Physical Exam  Vitals signs and nursing note reviewed  Constitutional:       Appearance: She is cachectic  Cardiovascular:      Rate and Rhythm: Normal rate and regular rhythm  Pulmonary:      Effort: Pulmonary effort is normal       Breath sounds: Normal breath sounds  No wheezing, rhonchi or rales  Abdominal:      General: Bowel sounds are normal       Palpations: Abdomen is soft  Tenderness: There is no abdominal tenderness  Skin:     General: Skin is warm and dry  Neurological:      Mental Status: She is lethargic  Comments: Patient is lethargic, she wakes with stimuli but quickly returns to sleep   She has contractures          Additional Data: Labs:    Results from last 7 days   Lab Units 02/03/21  0644   WBC Thousand/uL 5 43   HEMOGLOBIN g/dL 11 9   HEMATOCRIT % 37 6   PLATELETS Thousands/uL 154   NEUTROS PCT % 53   LYMPHS PCT % 34   MONOS PCT % 10   EOS PCT % 2     Results from last 7 days   Lab Units 02/03/21  0644 02/02/21  0948   SODIUM mmol/L 137 144   POTASSIUM mmol/L 3 5 3 7   CHLORIDE mmol/L 107 108   CO2 mmol/L 20* 22   BUN mg/dL 8 22   CREATININE mg/dL 0 38* 0 63   ANION GAP mmol/L 10 14*   CALCIUM mg/dL 8 7 9 4   ALBUMIN g/dL  --  3 7   TOTAL BILIRUBIN mg/dL  --  0 30   ALK PHOS U/L  --  121*   ALT U/L  --  95*   AST U/L  --  53*   GLUCOSE RANDOM mg/dL 104 159*                 Results from last 7 days   Lab Units 02/02/21  1306 02/02/21  0948   LACTIC ACID mmol/L 0 8 2 7*           * I Have Reviewed All Lab Data Listed Above  * Additional Pertinent Lab Tests Reviewed: All Labs Within Last 24 Hours Reviewed    Imaging:    Imaging Reports Reviewed Today Include: none  Imaging Personally Reviewed by Myself Includes:  none    Recent Cultures (last 7 days):     Results from last 7 days   Lab Units 02/02/21  1001 02/02/21  0948   BLOOD CULTURE  Received in Microbiology Lab  Culture in Progress  Received in Microbiology Lab  Culture in Progress         Last 24 Hours Medication List:   Current Facility-Administered Medications   Medication Dose Route Frequency Provider Last Rate    Brivaracetam  7 5 mL Per PEG Tube Q12H Jack Loza PA-C      cefTRIAXone  1,000 mg Intravenous Q24H Shaji Lee PA-C 1,000 mg (02/02/21 1918)    cloBAZam  10 mg Per G Tube HS Jack Loza PA-C      cloBAZam  5 mg Per G Tube QAM Leigh Ann Regalado PA-C      erythromycin  0 5 inch Both Eyes Q6H [facility] KATHY Ku      heparin (porcine)  5,000 Units Subcutaneous Q8H [facility] Jack Loza PA-C      LORazepam  1 mg Intravenous Q6H PRN Shaji Lee PA-C      melatonin  6 mg Per G Tube HS Jack Loza PA-C      multivitamin with iron-minerals  5 mL Per G Tube Daily Ash Alaniz PA-C      rufinamide  1,600 mg Per G Tube Q12H Jack Loza PA-C      topiramate  250 mg Per G Tube Q12H [address] Jack Loza PA-C      vancomycin  15 mg/kg Intravenous Q12H Clarice Hardwick  mg (02/03/21 0900)        Today, Patient Was Seen By: Chinedu Conrad PA-C    ** Please Note: Dictation voice to text software may have been used in the creation of this document   **

## 2021-02-03 NOTE — CONSULTS
TeleConsultation - Neurology   Renetta Jones 45 y o  female MRN: [id number]  Unit/Bed#: 403-01 Encounter: [id number]      REQUIRED DOCUMENTATION:     1  This service was provided via Telemedicine  2  Provider located at [place], [place]  3  TeleMed provider: Arcenio Miller MD   4  Identify all parties in room with patient during tele consult:  Patient, bedside nurse  5  After connecting through televideo, patient was identified by name and date of birth and assistant checked wristband  Patient was then informed that this was a Telemedicine visit and that the exam was being conducted confidentially over secure lines  My office door was closed  No one else was in the room  Patient acknowledged consent and understanding of privacy and security of the Telemedicine visit, and gave us permission to have the assistant stay in the room in order to assist with the history and to conduct the exam   I informed the patient that I have reviewed their record in Epic and presented the opportunity for them to ask any questions regarding the visit today  The patient agreed to participate  Assessment/Plan     No new Assessment & Plan notes have been filed under this hospital service since the last note was generated  Service: Neurology    45year old woman with Seth Sangita, intractable seizures, anoxic injury, presenting with increased seizure frequency in setting of fever  Was started on antibiotics yesterday evening, and continued on home AED's, except for epidiolex, which is no formulary and being delivered from [facility] today  On exam, does not have any meningeal signs, and does have purposeful movements    Has symmetric motor exam   Not seizing during my evaluation, and none observed by nursing this AM       Suspect she had lowered seizure threshold due to same process that caused her fever       -would continue on her AED's as scheduled  -seizure precautions, PRN ativan for ongoing seizure, but would use very judiciously, only if seizures prolonged, as she has an epilepsy syndrome  Complete seizure freedom is likely unachievable without overly sedating amount of medications     -continue workup and treatment of infectious source per medicine  -no further inpatient neurology recommendations at this point  Please feel free to reach out with any further issues/questions we can help address  Recommendations for outpatient neurological follow up have yet to be determined  History of Present Illness     Reason for Consult / Principal Problem: increase seizure frequency  Hx and PE limited by: patient unable to give history, nonverbral at baseline  HPI: Darylene Row is a 45 y o  (handedness not determinable) female with Idamae Blase syndrome, who presents with increasing seizure frequency from nursing home, in setting of fever, tachycardia, and hypotension, with lactatemia  Unable to obtain history from patient  Majority of history is taken from chart review and bedside nurse  She presented yesterday from NH with frequent seizures  In past, seizures are described as several seconds of stiffening  She had a prolonged EEG previously, which showed innumerable tonic seizures throughout the day  Seizures were both generalized and focal   She is followed by the epilepsy group in [facility]  Current medications include onfi, 5mg AM 10mg Hs, breviact 75mg BID, epidiolex 400mg BID, and rufinamide 400mg BID, and topomax 250mg BID  At last epilepsy visit, she was noted to be more sedated, and breviact was lowered to its current dose  This AM, per nursing, patient has not been observed to have any tonic seizure activity  She does not follow commands and does not speak  However, she does have intact cranial nerves, and moves all 4 extremities equally        Inpatient consult to Neurology  Consult performed by: Waldo Simpson MD  Consult ordered by: Vivi Segal PA-C           Review of Systems    Historical Information   Past Medical History:   Diagnosis Date    ADHD     [facility] [address] )     Autistic disorder     Breakthrough seizure ([facility] [address] ) 8/1/2019    Dysphagia     Dysphagia, oropharyngeal phase     Gastrostomy tube dependent (HCC)     14 Fr as of 05/19/2020    Hyperammonemia (HCC)     Hyperkeratosis     Hypotension     Intellectual disability     Lennox-Gastaut syndrome with tonic seizures (HCC)     Lethargy     Liver enzyme elevation     Onychomycosis     Osteoporosis     Osteoporosis      Past Surgical History:   Procedure Laterality Date    ABDOMINAL SURGERY      CARDIAC PACEMAKER PLACEMENT      vns implant l chest    IR GASTROSTOMY TUBE PLACEMENT  11/21/2019    IR THORACENTESIS  12/17/2018    JEJUNOSTOMY FEEDING TUBE      history of - most recently, PEG tube    PEG TUBE PLACEMENT      DC IMP STIM,CRANIAL,SUBQ,1 ARRAY Left 12/18/2019    Procedure: REPLACEMENT IMPLANTABLE PULSE GENERATOR FOR VAGAL NERVE STIMULATOR, LEFT CHEST;  Surgeon: Italia Sawyer MD;  Location: [facility];  Service: Neurosurgery     Social History   Social History     Substance and Sexual Activity   Alcohol Use Not Currently     Social History     Substance and Sexual Activity   Drug Use No     E-Cigarette/Vaping    E-Cigarette Use Never User      E-Cigarette/Vaping Substances     Social History     Tobacco Use   Smoking Status Never Smoker   Smokeless Tobacco Never Used     Family History: History reviewed  No pertinent family history      Meds/Allergies   current meds:   Current Facility-Administered Medications   Medication Dose Route Frequency    Brivaracetam SOLN 75 mg  7 5 mL Per PEG Tube Q12H    cefTRIAXone (ROCEPHIN) IVPB (premix in dextrose) 1,000 mg 50 mL  1,000 mg Intravenous Q24H    cloBAZam (ONFI) tablet 10 mg  10 mg Per G Tube HS    cloBAZam (ONFI) tablet 5 mg  5 mg Per G Tube QAM    erythromycin (ILOTYCIN) 0 5 % ophthalmic ointment 0 5 inch  0 5 inch Both Eyes Q6H [facility]    heparin (porcine) subcutaneous injection 5,000 Units  5,000 Units Subcutaneous Q8H [facility]    LORazepam (ATIVAN) injection 1 mg  1 mg Intravenous Q6H PRN    melatonin tablet 6 mg  6 mg Per G Tube HS    multivitamin with iron-minerals liquid 5 mL  5 mL Per G Tube Daily    rufinamide (BANZEL) tablet 1,600 mg  1,600 mg Per G Tube Q12H    topiramate (TOPAMAX) tablet 250 mg  250 mg Per G Tube Q12H [facility]    vancomycin (VANCOCIN) 500 mg in sodium chloride 0 9 % 100 mL IVPB  15 mg/kg Intravenous Q12H    and PTA meds:   Prior to Admission Medications   Prescriptions Last Dose Informant Patient Reported? Taking?    Brivaracetam (Briviact) 10 MG/ML SOLN 2021 at 0900  No Yes   Si 5 mL (75 mg total) by Per PEG Tube route every 12 (twelve) hours   MELATONIN PO 2021 at 2100 Outside Facility (Specify) Yes Yes   Si mg by Per G Tube route daily at bedtime   Multiple Vitamins-Minerals (MULTIVITAMIN WITH IRON-MINERALS) liquid 2021 at 0900 Outside Facility (Specify) Yes Yes   Si mL by Per G Tube route daily   Probiotic Product (ALEXANDER-BID PROBIOTIC PO) 2021 at 0900 Outside Facility (Specify) Yes Yes   Si tablet by Per G Tube route daily     acetaminophen (TYLENOL) 325 mg tablet Unknown at Unknown time  Yes No   Sig: Take 650 mg by mouth every 6 (six) hours as needed for mild pain or fever   bisacodyl (BISCOLAX) 10 mg suppository Unknown at Unknown time Outside Facility (Specify) Yes No   Sig: Insert 10 mg into the rectum daily at bedtime as needed for constipation (if no BM day #4)   bisacodyl (FLEET BISACODYL) 10 MG/30ML ENEM Unknown at Unknown time  Yes No   Sig: Insert 10 mg into the rectum as needed for constipation Give at hs on day 5 if suppository is not effective   cannabidiol (Epidiolex) 100 mg/ml SOLN 2021 at 0900  No Yes   Si mL (400 mg total) by Per G Tube route 2 (two) times a day   cloBAZam (ONFI) 10 MG tablet 2021 at 2100  No Yes   Si tablet (10 mg total) by Per G Tube route daily at bedtime diazepam (VALIUM) 5 MG/ML solution 2021 at 0810  No Yes   Sig: Give 1mL by PEG tube PRN generalized tonic clonic seizure longer than 5 minutes or continuous multiple tonic seizures over 30 minutes   magnesium hydroxide (MILK OF MAGNESIA) 400 mg/5 mL oral suspension Unknown at Unknown time Outside Facility (Specify) Yes No   Sig: Take 30 mL by mouth daily as needed for constipation If not BM by day 3   melatonin 3 mg   Yes Yes   Si mg by Per G Tube route daily at bedtime   rufinamide (BANZEL) 400 mg tablet 2021 at 0900  No Yes   Si tablets (1,600 mg total) by Per G Tube route every 12 (twelve) hours   topiramate (TOPAMAX) 50 MG tablet 2021 at 0900  No Yes   Si tablets (250 mg total) by Per G Tube route every 12 (twelve) hours      Facility-Administered Medications: None       Allergies   Allergen Reactions    Felbamate        Objective   Vitals:Blood pressure 94/54, pulse 78, temperature 98 4 °F (36 9 °C), temperature source Temporal, resp  rate 18, height 5' (1 524 m), weight 38 9 kg (85 lb 12 1 oz), SpO2 97 %  ,Body mass index is 16 75 kg/m²  Intake/Output Summary (Last 24 hours) at 2/3/2021 0856  Last data filed at 2021 1837  Gross per 24 hour   Intake 1000 ml   Output 200 ml   Net 800 ml       Invasive Devices: Invasive Devices     Peripheral Intravenous Line            Peripheral IV 21 Right Forearm less than 1 day          Drain            Gastrostomy/Enterostomy Gastrostomy 14 Fr   days                Physical Exam   Lying in bed, NAD  HEENT: no nuchal rigidity  Neurologic Exam  Sleeping, does momentarily wake up and attend examiner, goes back to sleep  Does have purposeful movements, rubs her eyes, moves the covers to get into more comfortable position  Pupils 3-2mm, symmetric  Has grimace that is symmetric  No gross facial asymmetry  MOTOR: withdraws upper and lower extremities equally  SENSORY: does grimace to stimulus in all extremities  Lab Results:   CBC:   Results from last 7 days   Lab Units 02/03/21  0644 02/02/21  0948   WBC Thousand/uL 5 43 7 85   RBC Million/uL 3 71* 4 08   HEMOGLOBIN g/dL 11 9 13 2   HEMATOCRIT % 37 6 41 2   MCV fL 101* 101*   PLATELETS Thousands/uL 154 161   , BMP/CMP:   Results from last 7 days   Lab Units 02/03/21  0644 02/02/21  0948   SODIUM mmol/L 137 144   POTASSIUM mmol/L 3 5 3 7   CHLORIDE mmol/L 107 108   CO2 mmol/L 20* 22   BUN mg/dL 8 22   CREATININE mg/dL 0 38* 0 63   CALCIUM mg/dL 8 7 9 4   AST U/L  --  53*   ALT U/L  --  95*   ALK PHOS U/L  --  121*   EGFR ml/min/1 73sq m 134 113     Imaging Studies: I have personally reviewed pertinent films in PACS     MRI brain 03/2019 reviewed: no diffusion restriction  Symmetric hippocampi  No abnormal enhancement

## 2021-02-03 NOTE — PLAN OF CARE
Problem: Potential for Falls  Goal: Patient will remain free of falls  Description: INTERVENTIONS:  - Assess patient frequently for physical needs  -  Identify cognitive and physical deficits and behaviors that affect risk of falls    -  [place] fall precautions as indicated by assessment   - Educate patient/family on patient safety including physical limitations  - Instruct patient to call for assistance with activity based on assessment  - Modify environment to reduce risk of injury  - Consider OT/PT consult to assist with strengthening/mobility  Outcome: Progressing     Problem: Prexisting or High Potential for Compromised Skin Integrity  Goal: Skin integrity is maintained or improved  Description: INTERVENTIONS:  - Identify patients at risk for skin breakdown  - Assess and monitor skin integrity  - Assess and monitor nutrition and hydration status  - Monitor labs   - Assess for incontinence   - Turn and reposition patient  - Assist with mobility/ambulation  - Relieve pressure over bony prominences  - Avoid friction and shearing  - Provide appropriate hygiene as needed including keeping skin clean and dry  - Evaluate need for skin moisturizer/barrier cream  - Collaborate with interdisciplinary team   - Patient/family teaching  - Consider wound care consult   Outcome: Progressing     Problem: PAIN - ADULT  Goal: Verbalizes/displays adequate comfort level or baseline comfort level  Description: Interventions:  - Encourage patient to monitor pain and request assistance  - Assess pain using appropriate pain scale  - Administer analgesics based on type and severity of pain and evaluate response  - Implement non-pharmacological measures as appropriate and evaluate response  - Consider cultural and social influences on pain and pain management  - Notify physician/advanced practitioner if interventions unsuccessful or patient reports new pain  Outcome: Progressing     Problem: INFECTION - ADULT  Goal: Absence or prevention of progression during hospitalization  Description: INTERVENTIONS:  - Assess and monitor for signs and symptoms of infection  - Monitor lab/diagnostic results  - Monitor all insertion sites, i e  indwelling lines, tubes, and drains  - Administer medications as ordered  - Instruct and encourage patient and family to use good hand hygiene technique  - Identify and instruct in appropriate isolation precautions for identified infection/condition  Outcome: Progressing     Problem: SAFETY ADULT  Goal: Patient will remain free of falls  Description: INTERVENTIONS:  - Assess patient frequently for physical needs  -  Identify cognitive and physical deficits and behaviors that affect risk of falls  -  [place] fall precautions as indicated by assessment   - Educate patient/family on patient safety including physical limitations  - Instruct patient to call for assistance with activity based on assessment  - Modify environment to reduce risk of injury  - Consider OT/PT consult to assist with strengthening/mobility  Outcome: Progressing     Problem: DISCHARGE PLANNING  Goal: Discharge to home or other facility with appropriate resources  Description: INTERVENTIONS:  - Identify barriers to discharge w/patient and caregiver  - Arrange for needed discharge resources and transportation as appropriate  - Identify discharge learning needs (meds, wound care, etc )  - Refer to Case Management Department for coordinating discharge planning if the patient needs post-hospital services based on physician/advanced practitioner order or complex needs related to functional status, cognitive ability, or social support system  Outcome: Progressing     Problem: Nutrition/Hydration-ADULT  Goal: Nutrient/Hydration intake appropriate for improving, restoring or maintaining nutritional needs  Description: Monitor and assess patient's nutrition/hydration status for malnutrition   Collaborate with interdisciplinary team and initiate plan and interventions as ordered  Monitor patient's weight and dietary intake as ordered or per policy  Utilize nutrition screening tool and intervene as necessary  Determine patient's food preferences and provide high-protein, high-caloric foods as appropriate       INTERVENTIONS:  - Monitor oral intake, urinary output, labs, and treatment plans  - Assess nutrition and hydration status and recommend course of action  - Evaluate amount of meals eaten  - Assist patient with eating if necessary   - Allow adequate time for meals  - Recommend/ encourage appropriate diets, oral nutritional supplements, and vitamin/mineral supplements  - Order, calculate, and assess calorie counts as needed  - Recommend, monitor, and adjust tube feedings and TPN/PPN based on assessed needs  - Assess need for intravenous fluids  - Provide specific nutrition/hydration education as appropriate  - Include patient/family/caregiver in decisions related to nutrition  Outcome: Progressing

## 2021-02-03 NOTE — PLAN OF CARE
Problem: Nutrition/Hydration-ADULT  Goal: Nutrient/Hydration intake appropriate for improving, restoring or maintaining nutritional needs  Description: Monitor and assess patient's nutrition/hydration status for malnutrition  Collaborate with interdisciplinary team and initiate plan and interventions as ordered  Monitor patient's weight and dietary intake as ordered or per policy  Utilize nutrition screening tool and intervene as necessary  Determine patient's food preferences and provide high-protein, high-caloric foods as appropriate       INTERVENTIONS:  - Monitor oral intake, urinary output, labs, and treatment plans  - Assess nutrition and hydration status and recommend course of action  - Evaluate amount of meals eaten  - Assist patient with eating if necessary   - Allow adequate time for meals  - Recommend/ encourage appropriate diets, oral nutritional supplements, and vitamin/mineral supplements  - Order, calculate, and assess calorie counts as needed  - Recommend, monitor, and adjust tube feedings and TPN/PPN based on assessed needs  - Assess need for intravenous fluids  - Provide specific nutrition/hydration education as appropriate  - Include patient/family/caregiver in decisions related to nutrition  Outcome: Not Progressing; EN feedings orders, will monitor tolerance

## 2021-02-03 NOTE — PROGRESS NOTES
Vancomycin IV Pharmacy-to-Dose Consultation    Rojas Shetty is a 45 y o  female who is currently receiving Vancomycin IV with management by the Pharmacy Consult service  Assessment/Plan:  The patient was reviewed  Renal function is stable and no signs or symptoms of nephrotoxicity and/or infusion reactions were documented in the chart  Based on todays assessment, continue current vancomycin (day # 2) dosing of 500mg Q12H, with a plan for trough to be drawn at 06:30 on 02/04/2021  We will continue to follow the patients culture results and clinical progress daily      Mickeal Overall, Pharmacist

## 2021-02-03 NOTE — ASSESSMENT & PLAN NOTE
Reports of multiple breakthrough seizures at the nursing home on day of admission  Will maintain seizure precautions  Give IV fluid hydration as the patient does appear dry  Monitor for other possible causes of breakthrough seizure  Continue Onfi 10 mg hs and add 5 mg qam as noted by neurology  Neurology consult appreciated- no further inpatient neurologic workup recommended at this time,  will continue infectious workup

## 2021-02-03 NOTE — ASSESSMENT & PLAN NOTE
Possibley due to IV ativan  Blood pressure continues to be low normal  Will monitor closely and continue IV fluid hydration

## 2021-02-03 NOTE — ASSESSMENT & PLAN NOTE
Low grade fever noted with tachycardia  Also transient lactic acidosis, hypotension noted  Possible viral syndrome  Recent covid positive on 1/1/21  Taken off contact precautions at nursing home  CT chest, abdomen and pelvis is negative for acute infection  UA appears unremarkable  No distinct source of infection, but given limited history the patient was initiated on IV rocephin  Procalcitonin pending, await blood culture results

## 2021-02-04 LAB
ANION GAP SERPL CALCULATED.3IONS-SCNC: 11 MMOL/L (ref 4–13)
BASOPHILS # BLD AUTO: 0.05 THOUSANDS/ΜL (ref 0–0.1)
BASOPHILS NFR BLD AUTO: 1 % (ref 0–1)
BUN SERPL-MCNC: 11 MG/DL (ref 5–25)
CALCIUM SERPL-MCNC: 9.2 MG/DL (ref 8.3–10.1)
CHLORIDE SERPL-SCNC: 107 MMOL/L (ref 100–108)
CO2 SERPL-SCNC: 20 MMOL/L (ref 21–32)
CREAT SERPL-MCNC: 0.5 MG/DL (ref 0.6–1.3)
EOSINOPHIL # BLD AUTO: 0.16 THOUSAND/ΜL (ref 0–0.61)
EOSINOPHIL NFR BLD AUTO: 3 % (ref 0–6)
ERYTHROCYTE [DISTWIDTH] IN BLOOD BY AUTOMATED COUNT: 12.7 % (ref 11.6–15.1)
GFR SERPL CREATININE-BSD FRML MDRD: 122 ML/MIN/1.73SQ M
GLUCOSE SERPL-MCNC: 151 MG/DL (ref 65–140)
HCT VFR BLD AUTO: 37.1 % (ref 34.8–46.1)
HGB BLD-MCNC: 12 G/DL (ref 11.5–15.4)
IMM GRANULOCYTES # BLD AUTO: 0.01 THOUSAND/UL (ref 0–0.2)
IMM GRANULOCYTES NFR BLD AUTO: 0 % (ref 0–2)
LYMPHOCYTES # BLD AUTO: 2.02 THOUSANDS/ΜL (ref 0.6–4.47)
LYMPHOCYTES NFR BLD AUTO: 39 % (ref 14–44)
MCH RBC QN AUTO: 32.6 PG (ref 26.8–34.3)
MCHC RBC AUTO-ENTMCNC: 32.3 G/DL (ref 31.4–37.4)
MCV RBC AUTO: 101 FL (ref 82–98)
MONOCYTES # BLD AUTO: 0.48 THOUSAND/ΜL (ref 0.17–1.22)
MONOCYTES NFR BLD AUTO: 9 % (ref 4–12)
MRSA NOSE QL CULT: NORMAL
NEUTROPHILS # BLD AUTO: 2.41 THOUSANDS/ΜL (ref 1.85–7.62)
NEUTS SEG NFR BLD AUTO: 48 % (ref 43–75)
NRBC BLD AUTO-RTO: 0 /100 WBCS
PLATELET # BLD AUTO: 159 THOUSANDS/UL (ref 149–390)
PMV BLD AUTO: 12 FL (ref 8.9–12.7)
POTASSIUM SERPL-SCNC: 3.6 MMOL/L (ref 3.5–5.3)
PROCALCITONIN SERPL-MCNC: <0.05 NG/ML
RBC # BLD AUTO: 3.68 MILLION/UL (ref 3.81–5.12)
SODIUM SERPL-SCNC: 138 MMOL/L (ref 136–145)
VANCOMYCIN TROUGH SERPL-MCNC: 5.1 UG/ML (ref 10–20)
WBC # BLD AUTO: 5.13 THOUSAND/UL (ref 4.31–10.16)

## 2021-02-04 PROCEDURE — 99232 SBSQ HOSP IP/OBS MODERATE 35: CPT | Performed by: PHYSICIAN ASSISTANT

## 2021-02-04 PROCEDURE — 84145 PROCALCITONIN (PCT): CPT | Performed by: PHYSICIAN ASSISTANT

## 2021-02-04 PROCEDURE — 80202 ASSAY OF VANCOMYCIN: CPT | Performed by: PHYSICIAN ASSISTANT

## 2021-02-04 PROCEDURE — 85025 COMPLETE CBC W/AUTO DIFF WBC: CPT | Performed by: PHYSICIAN ASSISTANT

## 2021-02-04 PROCEDURE — 80048 BASIC METABOLIC PNL TOTAL CA: CPT | Performed by: PHYSICIAN ASSISTANT

## 2021-02-04 RX ORDER — SODIUM CHLORIDE 9 MG/ML
100 INJECTION, SOLUTION INTRAVENOUS CONTINUOUS
Status: DISCONTINUED | OUTPATIENT
Start: 2021-02-04 | End: 2021-02-06 | Stop reason: HOSPADM

## 2021-02-04 RX ADMIN — CLOBAZAM 5 MG: 10 TABLET ORAL at 10:33

## 2021-02-04 RX ADMIN — SODIUM CHLORIDE 250 ML: 0.9 INJECTION, SOLUTION INTRAVENOUS at 23:57

## 2021-02-04 RX ADMIN — Medication 6 MG: at 21:30

## 2021-02-04 RX ADMIN — ERYTHROMYCIN 0.5 INCH: 5 OINTMENT OPHTHALMIC at 05:44

## 2021-02-04 RX ADMIN — RUFINAMIDE 1600 MG: 200 TABLET, FILM COATED ORAL at 10:31

## 2021-02-04 RX ADMIN — RUFINAMIDE 1600 MG: 200 TABLET, FILM COATED ORAL at 21:33

## 2021-02-04 RX ADMIN — TOPIRAMATE 250 MG: 100 TABLET, FILM COATED ORAL at 10:32

## 2021-02-04 RX ADMIN — ERYTHROMYCIN 0.5 INCH: 5 OINTMENT OPHTHALMIC at 23:57

## 2021-02-04 RX ADMIN — ERYTHROMYCIN 0.5 INCH: 5 OINTMENT OPHTHALMIC at 11:39

## 2021-02-04 RX ADMIN — CLOBAZAM 10 MG: 10 TABLET ORAL at 21:39

## 2021-02-04 RX ADMIN — HEPARIN SODIUM 5000 UNITS: 5000 INJECTION INTRAVENOUS; SUBCUTANEOUS at 13:49

## 2021-02-04 RX ADMIN — HEPARIN SODIUM 5000 UNITS: 5000 INJECTION INTRAVENOUS; SUBCUTANEOUS at 21:39

## 2021-02-04 RX ADMIN — MULTIVITAMIN 5 ML: LIQUID ORAL at 10:32

## 2021-02-04 RX ADMIN — BRIVARACETAM 75 MG: 10 SOLUTION ORAL at 17:43

## 2021-02-04 RX ADMIN — ERYTHROMYCIN 0.5 INCH: 5 OINTMENT OPHTHALMIC at 17:43

## 2021-02-04 RX ADMIN — SODIUM CHLORIDE 75 ML/HR: 0.9 INJECTION, SOLUTION INTRAVENOUS at 17:36

## 2021-02-04 RX ADMIN — TOPIRAMATE 250 MG: 100 TABLET, FILM COATED ORAL at 21:31

## 2021-02-04 RX ADMIN — HEPARIN SODIUM 5000 UNITS: 5000 INJECTION INTRAVENOUS; SUBCUTANEOUS at 05:43

## 2021-02-04 RX ADMIN — BRIVARACETAM 75 MG: 10 SOLUTION ORAL at 05:43

## 2021-02-04 RX ADMIN — VANCOMYCIN HYDROCHLORIDE 500 MG: 5 INJECTION, POWDER, LYOPHILIZED, FOR SOLUTION INTRAVENOUS at 10:30

## 2021-02-04 NOTE — ASSESSMENT & PLAN NOTE
Reports of multiple breakthrough seizures at the nursing home on day of admission  Will maintain seizure precautions  Monitor for other possible causes of breakthrough seizure  Continue Onfi 10 mg hs and added 5 mg qam as noted by neurology  Neurology consult appreciated- no further inpatient neurologic workup recommended at this time, recommending infectious workup  Infectious workup negative for far  No additional fevers noted

## 2021-02-04 NOTE — PROGRESS NOTES
Vancomycin Assessment    Sharon Simons is a 45 y o  female who is currently receiving vancomycin 500mg Q12H for other SIRS   Relevant clinical data and objective history reviewed:  Creatinine   Date Value Ref Range Status   02/04/2021 0 50 (L) 0 60 - 1 30 mg/dL Final     Comment:     Standardized to IDMS reference method   02/03/2021 0 38 (L) 0 60 - 1 30 mg/dL Final     Comment:     Standardized to IDMS reference method   02/02/2021 0 63 0 60 - 1 30 mg/dL Final     Comment:     Standardized to IDMS reference method   04/01/2015 0 36 (L) 0 60 - 1 30 mg/dL Final     Comment:     Standardized to IDMS reference method   02/02/2015 0 51 (L) 0 60 - 1 30 mg/dL Final     Comment:     Standardized to IDMS reference method   10/31/2014 0 50 (L) 0 60 - 1 30 mg/dL Final     Comment:     Standardized to IDMS reference method     Vancomycin Rm   Date Value Ref Range Status   10/17/2017 11 1 ug/mL Final     BP (!) 88/56   Pulse 74   Temp 98 7 °F (37 1 °C) (Temporal)   Resp 16   Ht 5' (1 524 m)   Wt 38 9 kg (85 lb 12 1 oz)   LMP  (LMP Unknown)   SpO2 96%   BMI 16 75 kg/m²   I/O last 3 completed shifts: In: 615 [NG/GT:200; Feedings:415]  Out: -   Lab Results   Component Value Date/Time    BUN 11 02/04/2021 05:55 AM    BUN 7 04/01/2015 09:35 PM    WBC 5 13 02/04/2021 05:55 AM    WBC 13 78 (H) 04/01/2015 09:35 PM    HGB 12 0 02/04/2021 05:55 AM    HGB 12 7 04/01/2015 09:35 PM    HCT 37 1 02/04/2021 05:55 AM    HCT 39 2 04/01/2015 09:35 PM     (H) 02/04/2021 05:55 AM     (H) 04/01/2015 09:35 PM     02/04/2021 05:55 AM     04/01/2015 09:35 PM     Temp Readings from Last 3 Encounters:   02/03/21 98 7 °F (37 1 °C) (Temporal)   12/06/20 98 2 °F (36 8 °C) (Temporal)   10/13/20 97 8 °F (36 6 °C)     Vancomycin Days of Therapy: 3    Assessment/Plan  The patient is currently on vancomycin utilizing scheduled dosing     The patient is receiving 500mg Q12H with the most recent vancomycin level being not at steady-state and sub-therapeutic based on a goal of 15-20 (appropriate for most indications) ; therefore, after clinical evaluation will be changed to 1000mg Q12H   Pharmacy will continue to follow closely for s/sx of nephrotoxicity, infusion reactions, and appropriateness of therapy  BMP and CBC will be ordered per protocol  Plan for trough as patient approaches steady state, prior to the 4th  dose at approximately 05:30 on 02/06/2021  Pharmacy will continue to follow the patients culture results and clinical progress daily      Sheba Chou, Pharmacist

## 2021-02-04 NOTE — PLAN OF CARE
Problem: Potential for Falls  Goal: Patient will remain free of falls  Description: INTERVENTIONS:  - Assess patient frequently for physical needs  -  Identify cognitive and physical deficits and behaviors that affect risk of falls    -  [place] fall precautions as indicated by assessment   - Educate patient/family on patient safety including physical limitations  - Instruct patient to call for assistance with activity based on assessment  - Modify environment to reduce risk of injury  - Consider OT/PT consult to assist with strengthening/mobility  Outcome: Progressing     Problem: Prexisting or High Potential for Compromised Skin Integrity  Goal: Skin integrity is maintained or improved  Description: INTERVENTIONS:  - Identify patients at risk for skin breakdown  - Assess and monitor skin integrity  - Assess and monitor nutrition and hydration status  - Monitor labs   - Assess for incontinence   - Turn and reposition patient  - Assist with mobility/ambulation  - Relieve pressure over bony prominences  - Avoid friction and shearing  - Provide appropriate hygiene as needed including keeping skin clean and dry  - Evaluate need for skin moisturizer/barrier cream  - Collaborate with interdisciplinary team   - Patient/family teaching  - Consider wound care consult   Outcome: Progressing     Problem: PAIN - ADULT  Goal: Verbalizes/displays adequate comfort level or baseline comfort level  Description: Interventions:  - Encourage patient to monitor pain and request assistance  - Assess pain using appropriate pain scale  - Administer analgesics based on type and severity of pain and evaluate response  - Implement non-pharmacological measures as appropriate and evaluate response  - Consider cultural and social influences on pain and pain management  - Notify physician/advanced practitioner if interventions unsuccessful or patient reports new pain  Outcome: Progressing     Problem: INFECTION - ADULT  Goal: Absence or prevention of progression during hospitalization  Description: INTERVENTIONS:  - Assess and monitor for signs and symptoms of infection  - Monitor lab/diagnostic results  - Monitor all insertion sites, i e  indwelling lines, tubes, and drains  - Administer medications as ordered  - Instruct and encourage patient and family to use good hand hygiene technique  - Identify and instruct in appropriate isolation precautions for identified infection/condition  Outcome: Progressing     Problem: SAFETY ADULT  Goal: Patient will remain free of falls  Description: INTERVENTIONS:  - Assess patient frequently for physical needs  -  Identify cognitive and physical deficits and behaviors that affect risk of falls  -  [place] fall precautions as indicated by assessment   - Educate patient/family on patient safety including physical limitations  - Instruct patient to call for assistance with activity based on assessment  - Modify environment to reduce risk of injury  - Consider OT/PT consult to assist with strengthening/mobility  Outcome: Progressing     Problem: DISCHARGE PLANNING  Goal: Discharge to home or other facility with appropriate resources  Description: INTERVENTIONS:  - Identify barriers to discharge w/patient and caregiver  - Arrange for needed discharge resources and transportation as appropriate  - Identify discharge learning needs (meds, wound care, etc )  - Refer to Case Management Department for coordinating discharge planning if the patient needs post-hospital services based on physician/advanced practitioner order or complex needs related to functional status, cognitive ability, or social support system  Outcome: Progressing     Problem: Nutrition/Hydration-ADULT  Goal: Nutrient/Hydration intake appropriate for improving, restoring or maintaining nutritional needs  Description: Monitor and assess patient's nutrition/hydration status for malnutrition   Collaborate with interdisciplinary team and initiate plan and interventions as ordered  Monitor patient's weight and dietary intake as ordered or per policy  Utilize nutrition screening tool and intervene as necessary  Determine patient's food preferences and provide high-protein, high-caloric foods as appropriate       INTERVENTIONS:  - Monitor oral intake, urinary output, labs, and treatment plans  - Assess nutrition and hydration status and recommend course of action  - Evaluate amount of meals eaten  - Assist patient with eating if necessary   - Allow adequate time for meals  - Recommend/ encourage appropriate diets, oral nutritional supplements, and vitamin/mineral supplements  - Order, calculate, and assess calorie counts as needed  - Recommend, monitor, and adjust tube feedings and TPN/PPN based on assessed needs  - Assess need for intravenous fluids  - Provide specific nutrition/hydration education as appropriate  - Include patient/family/caregiver in decisions related to nutrition  Outcome: Progressing

## 2021-02-04 NOTE — ASSESSMENT & PLAN NOTE
Possibley due to IV ativan     Blood pressure continues to be low normal  Will monitor closely   Will restart IV fluid hydration

## 2021-02-04 NOTE — PROGRESS NOTES
Progress Note - Castro Thakkar 1980, 45 y o  female MRN: [id number]    Unit/Bed#: 403-01 Encounter: [id number]    Primary Care Provider: Lucien Burleson DO   Date and time admitted to hospital: 2/2/2021  9:23 AM        * Intractable epilepsy with status epilepticus [facility])  Assessment & Plan  Reports of multiple breakthrough seizures at the nursing home on day of admission  Will maintain seizure precautions  Monitor for other possible causes of breakthrough seizure  Continue Onfi 10 mg hs and added 5 mg qam as noted by neurology  Neurology consult appreciated- no further inpatient neurologic workup recommended at this time, recommending infectious workup  Infectious workup negative for far  No additional fevers noted  SIRS (systemic inflammatory response syndrome) (HCC)  Assessment & Plan  Low grade fever noted with tachycardia  Also transient lactic acidosis, hypotension noted  Possible viral syndrome  Recent covid positive on 1/1/21  Taken off contact precautions at nursing home  CT chest, abdomen and pelvis is negative for acute infection  UA appears unremarkable  No distinct source of infection, but given limited history the patient was initiated on IV rocephin and IV Vancomycin, I will discontinue this today 2/4 given blood cultures are negative at 48 hours  Procalcitonin <0 05 x 2  Remains afebrile x 48 hours  Low blood pressure  Assessment & Plan  Possibley due to IV ativan  Blood pressure continues to be low normal  Will monitor closely   Will restart IV fluid hydration    Somnolence  Assessment & Plan  Likely due to sedative effects of IV ativan given for seizure activity  Will need continuously heart rate and pulse oximetry monitoring on Alexis  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[facility])  Assessment & Plan  Patient is nonverbal at baseline  PEG tube for feedings and medications        VTE Pharmacologic Prophylaxis:   Pharmacologic: Heparin  Mechanical VTE Prophylaxis in Place: Yes    Patient Centered Rounds: I have performed bedside rounds with nursing staff today  Discussions with Specialists or Other Care Team Provider: nursingABNER      Time Spent for Care: 20 minutes  More than 50% of total time spent on counseling and coordination of care as described above  Current Length of Stay: 2 day(s)    Current Patient Status: Inpatient   Certification Statement: The patient will continue to require additional inpatient hospital stay due to need for IV fluids, monitoring off antibiotics  Discharge Plan: Possible discharge tomorrow if BP improves and patient remains afebrile  Code Status: Level 1 - Full Code      Subjective: The patient was seen and examined  The patient opens eyes with verbal stimuli  The patient is nonverbal at baseline  Objective:     Vitals:   Temp (24hrs), Av 5 °F (36 9 °C), Min:98 2 °F (36 8 °C), Max:98 7 °F (37 1 °C)    Temp:  [98 2 °F (36 8 °C)-98 7 °F (37 1 °C)] 98 2 °F (36 8 °C)  HR:  [72-85] 72  Resp:  [16] 16  BP: (82-90)/(52-56) 84/52  SpO2:  [96 %] 96 %  Body mass index is 16 75 kg/m²  Input and Output Summary (last 24 hours): Intake/Output Summary (Last 24 hours) at 2021 1643  Last data filed at 2021 0557  Gross per 24 hour   Intake 515 ml   Output --   Net 515 ml       Physical Exam:     Physical Exam  Vitals signs and nursing note reviewed  Constitutional:       General: She is sleeping  Appearance: She is cachectic  She is ill-appearing (chronically)  Cardiovascular:      Rate and Rhythm: Normal rate and regular rhythm  Pulmonary:      Effort: Pulmonary effort is normal       Breath sounds: Normal breath sounds  No wheezing, rhonchi or rales  Abdominal:      General: Bowel sounds are normal       Palpations: Abdomen is soft  Tenderness: There is no abdominal tenderness  Skin:     General: Skin is warm and dry  Neurological:      Mental Status: She is easily aroused        Comments: Patient nonverbal at baseline  Patient opens eyes to verbal stimuli but does not follow commands          Additional Data:     Labs:    Results from last 7 days   Lab Units 02/04/21  0555   WBC Thousand/uL 5 13   HEMOGLOBIN g/dL 12 0   HEMATOCRIT % 37 1   PLATELETS Thousands/uL 159   NEUTROS PCT % 48   LYMPHS PCT % 39   MONOS PCT % 9   EOS PCT % 3     Results from last 7 days   Lab Units 02/04/21  0555  02/02/21  0948   SODIUM mmol/L 138   < > 144   POTASSIUM mmol/L 3 6   < > 3 7   CHLORIDE mmol/L 107   < > 108   CO2 mmol/L 20*   < > 22   BUN mg/dL 11   < > 22   CREATININE mg/dL 0 50*   < > 0 63   ANION GAP mmol/L 11   < > 14*   CALCIUM mg/dL 9 2   < > 9 4   ALBUMIN g/dL  --   --  3 7   TOTAL BILIRUBIN mg/dL  --   --  0 30   ALK PHOS U/L  --   --  121*   ALT U/L  --   --  95*   AST U/L  --   --  53*   GLUCOSE RANDOM mg/dL 151*   < > 159*    < > = values in this interval not displayed  Results from last 7 days   Lab Units 02/04/21  0555 02/02/21  2205 02/02/21  1306 02/02/21  0948   LACTIC ACID mmol/L  --   --  0 8 2 7*   PROCALCITONIN ng/ml <0 05 <0 05  --   --            * I Have Reviewed All Lab Data Listed Above  * Additional Pertinent Lab Tests Reviewed: All Labs Within Last 24 Hours Reviewed    Imaging:    Imaging Reports Reviewed Today Include: none  Imaging Personally Reviewed by Myself Includes:  none    Recent Cultures (last 7 days):     Results from last 7 days   Lab Units 02/02/21  1001 02/02/21  0948   BLOOD CULTURE  No Growth at 48 hrs  No Growth at 48 hrs         Last 24 Hours Medication List:   Current Facility-Administered Medications   Medication Dose Route Frequency Provider Last Rate    Brivaracetam  7 5 mL Per PEG Tube Q12H Jack Loza PA-C      cloBAZam  10 mg Per G Tube HS Jack Loza PA-C      cloBAZam  5 mg Per G Tube KARLA Veliz PA-C      erythromycin  0 5 inch Both Eyes Q6H [facility] KATHY Braxton      heparin (porcine)  5,000 Units Subcutaneous Q8H [facility] Jack WAGNER Loza      LORazepam  1 mg Intravenous Q6H PRN Madi Wright PA-C      melatonin  6 mg Per G Tube HS Madi Wright PA-C      multivitamin with iron-minerals  5 mL Per G Tube Daily Jack Loza PA-C      rufinamide  1,600 mg Per G Tube Q12H Jack Loza PA-C      sodium chloride  75 mL/hr Intravenous Continuous Richie Peabody, PA-C      topiramate  250 mg Per G Tube Q12H [address] WAGNER HOLM          Today, Patient Was Seen By: Richie Peabody, PA-C    ** Please Note: Dictation voice to text software may have been used in the creation of this document   **

## 2021-02-04 NOTE — ASSESSMENT & PLAN NOTE
Low grade fever noted with tachycardia  Also transient lactic acidosis, hypotension noted  Possible viral syndrome  Recent covid positive on 1/1/21  Taken off contact precautions at nursing home  CT chest, abdomen and pelvis is negative for acute infection  UA appears unremarkable  No distinct source of infection, but given limited history the patient was initiated on IV rocephin and IV Vancomycin, I will discontinue this today 2/4 given blood cultures are negative at 48 hours  Procalcitonin <0 05 x 2  Remains afebrile x 48 hours

## 2021-02-04 NOTE — NURSING NOTE
RN walked into patient's room and patient was having a seizure with convulsions, lasting about 7 seconds  Patient remained safe, on seizure precautions  CATIE Rubio made aware  No additional orders placed  Will continue to monitor

## 2021-02-05 LAB
ALBUMIN SERPL BCP-MCNC: 3.1 G/DL (ref 3.5–5)
ALP SERPL-CCNC: 93 U/L (ref 46–116)
ALT SERPL W P-5'-P-CCNC: 71 U/L (ref 12–78)
ANION GAP SERPL CALCULATED.3IONS-SCNC: 14 MMOL/L (ref 4–13)
AST SERPL W P-5'-P-CCNC: 32 U/L (ref 5–45)
BILIRUB SERPL-MCNC: 0.3 MG/DL (ref 0.2–1)
BUN SERPL-MCNC: 8 MG/DL (ref 5–25)
CALCIUM ALBUM COR SERPL-MCNC: 9.1 MG/DL (ref 8.3–10.1)
CALCIUM SERPL-MCNC: 8.4 MG/DL (ref 8.3–10.1)
CHLORIDE SERPL-SCNC: 108 MMOL/L (ref 100–108)
CO2 SERPL-SCNC: 20 MMOL/L (ref 21–32)
CREAT SERPL-MCNC: 0.44 MG/DL (ref 0.6–1.3)
ERYTHROCYTE [DISTWIDTH] IN BLOOD BY AUTOMATED COUNT: 12.6 % (ref 11.6–15.1)
GFR SERPL CREATININE-BSD FRML MDRD: 128 ML/MIN/1.73SQ M
GLUCOSE SERPL-MCNC: 87 MG/DL (ref 65–140)
HCT VFR BLD AUTO: 36.5 % (ref 34.8–46.1)
HGB BLD-MCNC: 11.6 G/DL (ref 11.5–15.4)
MAGNESIUM SERPL-MCNC: 2 MG/DL (ref 1.6–2.6)
MCH RBC QN AUTO: 32.6 PG (ref 26.8–34.3)
MCHC RBC AUTO-ENTMCNC: 31.8 G/DL (ref 31.4–37.4)
MCV RBC AUTO: 103 FL (ref 82–98)
PHOSPHATE SERPL-MCNC: 3.3 MG/DL (ref 2.7–4.5)
PLATELET # BLD AUTO: 154 THOUSANDS/UL (ref 149–390)
PMV BLD AUTO: 12.2 FL (ref 8.9–12.7)
POTASSIUM SERPL-SCNC: 3.5 MMOL/L (ref 3.5–5.3)
PROT SERPL-MCNC: 6.4 G/DL (ref 6.4–8.2)
RBC # BLD AUTO: 3.56 MILLION/UL (ref 3.81–5.12)
SODIUM SERPL-SCNC: 142 MMOL/L (ref 136–145)
WBC # BLD AUTO: 5.29 THOUSAND/UL (ref 4.31–10.16)

## 2021-02-05 PROCEDURE — 84100 ASSAY OF PHOSPHORUS: CPT | Performed by: PHYSICIAN ASSISTANT

## 2021-02-05 PROCEDURE — 80053 COMPREHEN METABOLIC PANEL: CPT | Performed by: PHYSICIAN ASSISTANT

## 2021-02-05 PROCEDURE — 99232 SBSQ HOSP IP/OBS MODERATE 35: CPT | Performed by: PHYSICIAN ASSISTANT

## 2021-02-05 PROCEDURE — 83735 ASSAY OF MAGNESIUM: CPT | Performed by: PHYSICIAN ASSISTANT

## 2021-02-05 PROCEDURE — 85027 COMPLETE CBC AUTOMATED: CPT | Performed by: PHYSICIAN ASSISTANT

## 2021-02-05 RX ADMIN — ERYTHROMYCIN 0.5 INCH: 5 OINTMENT OPHTHALMIC at 06:32

## 2021-02-05 RX ADMIN — RUFINAMIDE 1600 MG: 200 TABLET, FILM COATED ORAL at 09:02

## 2021-02-05 RX ADMIN — MULTIVITAMIN 5 ML: LIQUID ORAL at 09:02

## 2021-02-05 RX ADMIN — SODIUM CHLORIDE 250 ML: 0.9 INJECTION, SOLUTION INTRAVENOUS at 02:27

## 2021-02-05 RX ADMIN — ERYTHROMYCIN 0.5 INCH: 5 OINTMENT OPHTHALMIC at 18:13

## 2021-02-05 RX ADMIN — TOPIRAMATE 250 MG: 100 TABLET, FILM COATED ORAL at 09:02

## 2021-02-05 RX ADMIN — BRIVARACETAM 75 MG: 10 SOLUTION ORAL at 06:31

## 2021-02-05 RX ADMIN — BRIVARACETAM 75 MG: 10 SOLUTION ORAL at 18:13

## 2021-02-05 RX ADMIN — TOPIRAMATE 250 MG: 100 TABLET, FILM COATED ORAL at 21:35

## 2021-02-05 RX ADMIN — ERYTHROMYCIN 0.5 INCH: 5 OINTMENT OPHTHALMIC at 12:15

## 2021-02-05 RX ADMIN — HEPARIN SODIUM 5000 UNITS: 5000 INJECTION INTRAVENOUS; SUBCUTANEOUS at 21:36

## 2021-02-05 RX ADMIN — SODIUM CHLORIDE 100 ML/HR: 0.9 INJECTION, SOLUTION INTRAVENOUS at 23:52

## 2021-02-05 RX ADMIN — CLOBAZAM 10 MG: 10 TABLET ORAL at 21:36

## 2021-02-05 RX ADMIN — RUFINAMIDE 1600 MG: 200 TABLET, FILM COATED ORAL at 21:36

## 2021-02-05 RX ADMIN — HEPARIN SODIUM 5000 UNITS: 5000 INJECTION INTRAVENOUS; SUBCUTANEOUS at 18:12

## 2021-02-05 RX ADMIN — SODIUM CHLORIDE 500 ML: 0.9 INJECTION, SOLUTION INTRAVENOUS at 10:14

## 2021-02-05 RX ADMIN — CLOBAZAM 5 MG: 10 TABLET ORAL at 09:03

## 2021-02-05 RX ADMIN — HEPARIN SODIUM 5000 UNITS: 5000 INJECTION INTRAVENOUS; SUBCUTANEOUS at 06:31

## 2021-02-05 RX ADMIN — SODIUM CHLORIDE 500 ML: 0.9 INJECTION, SOLUTION INTRAVENOUS at 12:34

## 2021-02-05 RX ADMIN — Medication 6 MG: at 21:35

## 2021-02-05 NOTE — PROGRESS NOTES
Progress Note - Sharon Simons 1980, 45 y o  female MRN: [id number]    Unit/Bed#: 403-01 Encounter: [id number]    Primary Care Provider: Abdi Cardenas DO   Date and time admitted to hospital: 2/2/2021  9:23 AM        * Intractable epilepsy with status epilepticus [facility])  Assessment & Plan  Reports of multiple breakthrough seizures at the nursing home on day of admission  Will maintain seizure precautions  Monitor for other possible causes of breakthrough seizure  Continue Onfi 10 mg hs and added 5 mg qam as noted by neurology  Neurology consult appreciated- no further inpatient neurologic workup recommended at this time, recommending infectious workup  Infectious workup negative for far  No additional fevers noted  SIRS (systemic inflammatory response syndrome) (HCC)  Assessment & Plan  Low grade fever noted with tachycardia  Also transient lactic acidosis, hypotension noted  Possible viral syndrome  Recent covid positive on 1/1/21  Taken off contact precautions at nursing home  CT chest, abdomen and pelvis is negative for acute infection  UA appears unremarkable  No distinct source of infection, but given limited history the patient was initiated on IV rocephin and IV Vancomycin, I will discontinue this today 2/4 given blood cultures are negative at 48 hours  Procalcitonin <0 05 x 2  Remains afebrile x 48 hours  Low blood pressure  Assessment & Plan  Blood pressure continues to be low normal  I spoke to nursing at [facility], patient's blood pressure usually runs in the 47'X systolic, occasionally in the upper 80's  Blood pressure decreased into the 70's overnight and continues to be in the 70's this morning  Patient received 250 mL bolus overnight  Patient received 500 mL bolus this am and blood pressure improved to 80/53  Will give another 500 mL bolus now  Continue IV fluids after bolus    Will monitor closely       Somnolence  Assessment & Plan  Likely due to sedative effects of IV ativan given for seizure activity  Patient more alert/awake today   Will need continuously heart rate and pulse oximetry monitoring on Alexis  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[facility])  Assessment & Plan  Patient is nonverbal at baseline  PEG tube for feedings and medications  VTE Pharmacologic Prophylaxis:   Pharmacologic: Heparin  Mechanical VTE Prophylaxis in Place: Yes    Patient Centered Rounds: I have performed bedside rounds with nursing staff today  Discussions with Specialists or Other Care Team Provider: nursing, CM, attending    Time Spent for Care: 20 minutes  More than 50% of total time spent on counseling and coordination of care as described above  Current Length of Stay: 3 day(s)    Current Patient Status: Inpatient   Certification Statement: The patient will continue to require additional inpatient hospital stay due to continued monitoring and treatment of hypotension with IV fluids     Discharge Plan: Possible discharge tomorrow if improvement of blood pressure  Code Status: Level 1 - Full Code      Subjective: The patient was seen and examined  The is more awake, interactive today  She is nonverbal at baseline  Objective:     Vitals:   Temp (24hrs), Av 1 °F (36 7 °C), Min:98 °F (36 7 °C), Max:98 2 °F (36 8 °C)    Temp:  [98 °F (36 7 °C)-98 2 °F (36 8 °C)] 98 2 °F (36 8 °C)  HR:  [65-87] 87  Resp:  [15-18] 15  BP: (72-91)/(45-59) 80/53  SpO2:  [96 %-99 %] 98 %  Body mass index is 16 75 kg/m²  Input and Output Summary (last 24 hours): Intake/Output Summary (Last 24 hours) at 2021 1315  Last data filed at 2021 1234  Gross per 24 hour   Intake 2208 75 ml   Output --   Net 2208 75 ml       Physical Exam:     Physical Exam  Vitals signs and nursing note reviewed  Constitutional:       General: She is awake  Appearance: She is cachectic  She is ill-appearing (chronically )     Cardiovascular:      Rate and Rhythm: Normal rate and regular rhythm  Pulmonary:      Effort: Pulmonary effort is normal       Breath sounds: Normal breath sounds  No wheezing, rhonchi or rales  Abdominal:      General: Bowel sounds are normal       Palpations: Abdomen is soft  Skin:     General: Skin is warm and dry  Neurological:      Mental Status: She is alert  Mental status is at baseline  Comments: Patient nonverbal at baseline  Patient more awake today  Additional Data:     Labs:    Results from last 7 days   Lab Units 02/05/21  0503 02/04/21  0555   WBC Thousand/uL 5 29 5 13   HEMOGLOBIN g/dL 11 6 12 0   HEMATOCRIT % 36 5 37 1   PLATELETS Thousands/uL 154 159   NEUTROS PCT %  --  48   LYMPHS PCT %  --  39   MONOS PCT %  --  9   EOS PCT %  --  3     Results from last 7 days   Lab Units 02/05/21  0503   SODIUM mmol/L 142   POTASSIUM mmol/L 3 5   CHLORIDE mmol/L 108   CO2 mmol/L 20*   BUN mg/dL 8   CREATININE mg/dL 0 44*   ANION GAP mmol/L 14*   CALCIUM mg/dL 8 4   ALBUMIN g/dL 3 1*   TOTAL BILIRUBIN mg/dL 0 30   ALK PHOS U/L 93   ALT U/L 71   AST U/L 32   GLUCOSE RANDOM mg/dL 87                 Results from last 7 days   Lab Units 02/04/21  0555 02/02/21  2205 02/02/21  1306 02/02/21  0948   LACTIC ACID mmol/L  --   --  0 8 2 7*   PROCALCITONIN ng/ml <0 05 <0 05  --   --            * I Have Reviewed All Lab Data Listed Above  * Additional Pertinent Lab Tests Reviewed: All Labs Within Last 24 Hours Reviewed    Imaging:    Imaging Reports Reviewed Today Include: none  Imaging Personally Reviewed by Myself Includes:  none    Recent Cultures (last 7 days):     Results from last 7 days   Lab Units 02/02/21  1001 02/02/21  0948   BLOOD CULTURE  No Growth at 48 hrs  No Growth at 48 hrs         Last 24 Hours Medication List:   Current Facility-Administered Medications   Medication Dose Route Frequency Provider Last Rate    Brivaracetam  7 5 mL Per PEG Tube Q12H Jack Loza PA-C      cloBAZam  10 mg Per G Tube  WAGNER ZimmerBAZam 5 mg Per G Tube QAM Reddy Galdamez PA-C      erythromycin  0 5 inch Both Eyes Q6H [address] KATHY Yost      heparin (porcine)  5,000 Units Subcutaneous Q8H [address] Jack Loza PA-C      LORazepam  1 mg Intravenous Q6H PRN Jessenia Cohen PA-C      melatonin  6 mg Per G Tube HS Jesseniamichael Cohen PA-C      multivitamin with iron-minerals  5 mL Per G Tube Daily Jack Loza PA-C      rufinamide  1,600 mg Per G Tube Q12H Jesseniamichael Cohen PA-C      sodium chloride  500 mL Intravenous Once Reddy Galdamez PA-C 500 mL (02/05/21 1234)    sodium chloride  100 mL/hr Intravenous Continuous Олег Wang PA-C 100 mL/hr (02/05/21 0503)    topiramate  250 mg Per G Tube Q12H [address] WAGNER HOLM          Today, Patient Was Seen By: Reddy Galdamez PA-C    ** Please Note: Dictation voice to text software may have been used in the creation of this document   **

## 2021-02-05 NOTE — NURSING NOTE
1935, PCA completed mouth care and repositioned patient  Following this, patient became verbal and was laughing  She sat up in bed 3 times  Presently she is resting quietly on her back  PAIN

## 2021-02-05 NOTE — CASE MANAGEMENT
Pt is not ready discharge for today   I cancelled ambulance transport for today  Pt will be possibly medically ready for discharge in the AM  [facility] will pick the patient up at 3 pm to transport the patient to [facility]  Abhilash Smith at [facility] notified of transport time

## 2021-02-05 NOTE — ASSESSMENT & PLAN NOTE
Blood pressure continues to be low normal  I spoke to nursing at [facility], patient's blood pressure usually runs in the 12'I systolic, occasionally in the upper 80's  Blood pressure decreased into the 70's overnight and continues to be in the 70's this morning  Patient received 250 mL bolus overnight  Patient received 500 mL bolus this am and blood pressure improved to 80/53  Will give another 500 mL bolus now  Continue IV fluids after bolus    Will monitor closely

## 2021-02-05 NOTE — NURSING NOTE
( 1205) Pt with low BP at 78/48  Abiel HADDAD notified and ordered 250ml ns bolus    Bolus started at this time     (0211) - BP recovered to 84/52

## 2021-02-05 NOTE — PLAN OF CARE
Problem: Potential for Falls  Goal: Patient will remain free of falls  Description: INTERVENTIONS:  - Assess patient frequently for physical needs  -  Identify cognitive and physical deficits and behaviors that affect risk of falls    -  [place] fall precautions as indicated by assessment   - Educate patient/family on patient safety including physical limitations  - Instruct patient to call for assistance with activity based on assessment  - Modify environment to reduce risk of injury  - Consider OT/PT consult to assist with strengthening/mobility  Outcome: Progressing     Problem: Prexisting or High Potential for Compromised Skin Integrity  Goal: Skin integrity is maintained or improved  Description: INTERVENTIONS:  - Identify patients at risk for skin breakdown  - Assess and monitor skin integrity  - Assess and monitor nutrition and hydration status  - Monitor labs   - Assess for incontinence   - Turn and reposition patient  - Assist with mobility/ambulation  - Relieve pressure over bony prominences  - Avoid friction and shearing  - Provide appropriate hygiene as needed including keeping skin clean and dry  - Evaluate need for skin moisturizer/barrier cream  - Collaborate with interdisciplinary team   - Patient/family teaching  - Consider wound care consult   Outcome: Progressing     Problem: PAIN - ADULT  Goal: Verbalizes/displays adequate comfort level or baseline comfort level  Description: Interventions:  - Encourage patient to monitor pain and request assistance  - Assess pain using appropriate pain scale  - Administer analgesics based on type and severity of pain and evaluate response  - Implement non-pharmacological measures as appropriate and evaluate response  - Consider cultural and social influences on pain and pain management  - Notify physician/advanced practitioner if interventions unsuccessful or patient reports new pain  Outcome: Progressing     Problem: INFECTION - ADULT  Goal: Absence or prevention of progression during hospitalization  Description: INTERVENTIONS:  - Assess and monitor for signs and symptoms of infection  - Monitor lab/diagnostic results  - Monitor all insertion sites, i e  indwelling lines, tubes, and drains  - Administer medications as ordered  - Instruct and encourage patient and family to use good hand hygiene technique  - Identify and instruct in appropriate isolation precautions for identified infection/condition  Outcome: Progressing     Problem: SAFETY ADULT  Goal: Patient will remain free of falls  Description: INTERVENTIONS:  - Assess patient frequently for physical needs  -  Identify cognitive and physical deficits and behaviors that affect risk of falls  -  [place] fall precautions as indicated by assessment   - Educate patient/family on patient safety including physical limitations  - Instruct patient to call for assistance with activity based on assessment  - Modify environment to reduce risk of injury  - Consider OT/PT consult to assist with strengthening/mobility  Outcome: Progressing     Problem: DISCHARGE PLANNING  Goal: Discharge to home or other facility with appropriate resources  Description: INTERVENTIONS:  - Identify barriers to discharge w/patient and caregiver  - Arrange for needed discharge resources and transportation as appropriate  - Identify discharge learning needs (meds, wound care, etc )  - Refer to Case Management Department for coordinating discharge planning if the patient needs post-hospital services based on physician/advanced practitioner order or complex needs related to functional status, cognitive ability, or social support system  Outcome: Progressing     Problem: Nutrition/Hydration-ADULT  Goal: Nutrient/Hydration intake appropriate for improving, restoring or maintaining nutritional needs  Description: Monitor and assess patient's nutrition/hydration status for malnutrition   Collaborate with interdisciplinary team and initiate plan and interventions as ordered  Monitor patient's weight and dietary intake as ordered or per policy  Utilize nutrition screening tool and intervene as necessary  Determine patient's food preferences and provide high-protein, high-caloric foods as appropriate       INTERVENTIONS:  - Monitor oral intake, urinary output, labs, and treatment plans  - Assess nutrition and hydration status and recommend course of action  - Evaluate amount of meals eaten  - Assist patient with eating if necessary   - Allow adequate time for meals  - Recommend/ encourage appropriate diets, oral nutritional supplements, and vitamin/mineral supplements  - Order, calculate, and assess calorie counts as needed  - Recommend, monitor, and adjust tube feedings and TPN/PPN based on assessed needs  - Assess need for intravenous fluids  - Provide specific nutrition/hydration education as appropriate  - Include patient/family/caregiver in decisions related to nutrition  Outcome: Progressing     Problem: NEUROSENSORY - ADULT  Goal: Remains free of injury related to seizures activity  Description: INTERVENTIONS  - Maintain airway, patient safety  and administer oxygen as ordered  - Monitor patient for seizure activity, document and report duration and description of seizure to physician/advanced practitioner  - If seizure occurs,  ensure patient safety during seizure  - Reorient patient post seizure  - Seizure pads on all 4 side rails  - Instruct patient/family to notify RN of any seizure activity including if an aura is experienced  - Instruct patient/family to call for assistance with activity based on nursing assessment  - Administer anti-seizure medications if ordered    Outcome: Progressing

## 2021-02-05 NOTE — ASSESSMENT & PLAN NOTE
Likely due to sedative effects of IV ativan given for seizure activity  Patient more alert/awake today 2/5  Will need continuously heart rate and pulse oximetry monitoring on Alexis

## 2021-02-06 VITALS
TEMPERATURE: 98.9 F | RESPIRATION RATE: 20 BRPM | SYSTOLIC BLOOD PRESSURE: 95 MMHG | HEIGHT: 60 IN | OXYGEN SATURATION: 98 % | HEART RATE: 86 BPM | BODY MASS INDEX: 16.84 KG/M2 | DIASTOLIC BLOOD PRESSURE: 61 MMHG | WEIGHT: 85.76 LBS

## 2021-02-06 LAB
ANION GAP SERPL CALCULATED.3IONS-SCNC: 12 MMOL/L (ref 4–13)
BUN SERPL-MCNC: 7 MG/DL (ref 5–25)
CALCIUM SERPL-MCNC: 8.2 MG/DL (ref 8.3–10.1)
CHLORIDE SERPL-SCNC: 111 MMOL/L (ref 100–108)
CO2 SERPL-SCNC: 20 MMOL/L (ref 21–32)
CREAT SERPL-MCNC: 0.4 MG/DL (ref 0.6–1.3)
FLUAV RNA RESP QL NAA+PROBE: NEGATIVE
FLUBV RNA RESP QL NAA+PROBE: NEGATIVE
GFR SERPL CREATININE-BSD FRML MDRD: 132 ML/MIN/1.73SQ M
GLUCOSE SERPL-MCNC: 125 MG/DL (ref 65–140)
POTASSIUM SERPL-SCNC: 3.8 MMOL/L (ref 3.5–5.3)
RSV RNA RESP QL NAA+PROBE: NEGATIVE
SARS-COV-2 RNA RESP QL NAA+PROBE: NEGATIVE
SODIUM SERPL-SCNC: 143 MMOL/L (ref 136–145)

## 2021-02-06 PROCEDURE — 0241U HB NFCT DS VIR RESP RNA 4 TRGT: CPT | Performed by: PHYSICIAN ASSISTANT

## 2021-02-06 PROCEDURE — 80048 BASIC METABOLIC PNL TOTAL CA: CPT | Performed by: PHYSICIAN ASSISTANT

## 2021-02-06 PROCEDURE — 99239 HOSP IP/OBS DSCHRG MGMT >30: CPT | Performed by: PHYSICIAN ASSISTANT

## 2021-02-06 RX ORDER — CLOBAZAM 10 MG/1
5 TABLET ORAL EVERY MORNING
Qty: 15 TABLET | Refills: 0
Start: 2021-02-07 | End: 2021-02-22

## 2021-02-06 RX ADMIN — ERYTHROMYCIN 0.5 INCH: 5 OINTMENT OPHTHALMIC at 05:04

## 2021-02-06 RX ADMIN — RUFINAMIDE 1600 MG: 200 TABLET, FILM COATED ORAL at 08:19

## 2021-02-06 RX ADMIN — MULTIVITAMIN 5 ML: LIQUID ORAL at 08:19

## 2021-02-06 RX ADMIN — HEPARIN SODIUM 5000 UNITS: 5000 INJECTION INTRAVENOUS; SUBCUTANEOUS at 13:12

## 2021-02-06 RX ADMIN — ERYTHROMYCIN 0.5 INCH: 5 OINTMENT OPHTHALMIC at 13:12

## 2021-02-06 RX ADMIN — BRIVARACETAM 75 MG: 10 SOLUTION ORAL at 05:04

## 2021-02-06 RX ADMIN — CLOBAZAM 5 MG: 10 TABLET ORAL at 08:20

## 2021-02-06 RX ADMIN — ERYTHROMYCIN 0.5 INCH: 5 OINTMENT OPHTHALMIC at 00:00

## 2021-02-06 RX ADMIN — SODIUM CHLORIDE 100 ML/HR: 0.9 INJECTION, SOLUTION INTRAVENOUS at 08:21

## 2021-02-06 RX ADMIN — TOPIRAMATE 250 MG: 100 TABLET, FILM COATED ORAL at 08:19

## 2021-02-06 RX ADMIN — HEPARIN SODIUM 5000 UNITS: 5000 INJECTION INTRAVENOUS; SUBCUTANEOUS at 05:03

## 2021-02-06 NOTE — DISCHARGE SUMMARY
Discharge- Saloni Dockery [facility] COTTAGE 1980, 45 y o  female MRN: [id number]    Unit/Bed#: 403-01 Encounter: [id number]    Primary Care Provider: Angel Narvaez DO   Date and time admitted to hospital: 2/2/2021  9:23 AM      * Intractable epilepsy with status epilepticus [facility])  Assessment & Plan  Reports of multiple breakthrough seizures at the nursing home on day of admission  Maintained on seizure precautions while here  Monitored for other possible causes of breakthrough seizure - could be viral syndrome with possible dehydration serving as a trigger for breakthrough seizure  Continue Onfi 10 mg HS and added 5 mg qam as noted by neurology  Continue Bnazel, topamax, brivaractem as previously taking  Neurology consult appreciated- no further inpatient neurologic workup recommended at this time  Infectious workup negative for far  No additional fevers noted  Blood cultures negative x 2  No source for infection identified  SIRS (systemic inflammatory response syndrome) (HCC)  Assessment & Plan  Low grade fever noted with tachycardia on admission  Also transient lactic acidosis, hypotension noted  Possible viral syndrome  Recent covid positive on 1/1/21  Taken off contact precautions at nursing home  CT chest, abdomen and pelvis is negative for acute infection  UA appears unremarkable  No distinct source of infection, but given limited history the patient was initiated on IV rocephin and IV Vancomycin, this was discontinued 2/4 given blood cultures are negative at 48 hours  Procalcitonin <0 05 x 2  Remained afebrile throughout her stay  Suspect possible viral etiology  Low blood pressure  Assessment & Plan  Blood pressure continues to be low normal  I spoke to nursing at [facility], patient's blood pressure usually runs in the 49'B systolic, occasionally in the upper 80's     Blood pressure decreased into the 70's overnight 2/4 and continues to be in the 70's 2/5  This has now improved following IV fluid hydration, BP 95/61 this morning  Somnolence  Assessment & Plan  Likely due to sedative effects of IV ativan given for seizure activity  Patient more alert/awake today  heart rate and pulse oximetry monitoring on Alexis remained stable  Blood pressure was transiently low and has improved    [place]enn[place]-Gastaut syndrome [facility])  Assessment & Plan  Patient is nonverbal at baseline  PEG tube for feedings and medications  Discharging Physician / Practitioner: Jamey Wadsworth PA-C  PCP: Carolyne Xiong DO  Admission Date:   Admission Orders (From admission, onward)     Ordered        02/02/21 1327  Inpatient Admission  Once                   Discharge Date: 02/06/21    Resolved Problems  Date Reviewed: 2/6/2021    None          Consultations During Hospital Stay:  · neurology    Procedures Performed:   · none    Significant Findings / Test Results:   Xr Chest 1 View Portable  Result Date: 2/2/2021  Impression: No acute cardiopulmonary disease  Workstation performed: [id number]     Ct Chest Abdomen Pelvis W Contrast  Addendum Date: 2/2/2021    ADDENDUM: 2 cm left thyroid nodule meets the size threshold criteria for further evaluation with ultrasound Ultrasound thyroid on nonemergent basis suggested Follow-up notification has been created for this addendum  Result Date: 2/2/2021  Impression: No acute fluid collection No bowel obstruction No acute consolidation Workstation performed: [id number]       Incidental Findings:   · See imaging findings above re: thyroid ultrasound  Test Results Pending at Discharge (will require follow up):   · none     Outpatient Tests Requested:  · nonurgent thyroid ultrasound re: incidentally found left thyroid nodule  Complications:  none    Reason for Admission: breakthrough seizure in the setting of known seizure disorder      Hospital Course:     Tessie Willis is a 45 y o  female patient who originally presented to the hospital on 2/2/2021 due to Seizure - Prior Hx Of (patient presents from [facility]  she has had multiple recurrent seizures  Nursing home gave 1mg of ativan and EMS gave another 1mg dose in route  no seizure activey upon arrival  )    Patient was admitted and seizure precautions were followed, and she was closely monitored following benzodiazepine administration  She was found to have findings consistent with SIRS, possibly due to viral syndrome although no distinct source was identified  Additionally, patient appeared clinically dehydrated with mild hypotension  She was given IV fluid hydration for this  It is possible dehydration and viral syndrome may have triggered her breakthrough seizures  Neurology recommended adding 5mg of Onfi in the morning in addition to the 10mg in the evening  Otherwise medication dosing will remain the same  Please see above list of diagnoses and related plan for additional information  Condition at Discharge: good     Discharge Day Visit / Exam:   Subjective:    Vitals: Blood Pressure: 95/61 (02/06/21 0642)  Pulse: 86 (02/06/21 0642)  Temperature: 98 9 °F (37 2 °C) (02/06/21 0642)  Temp Source: Axillary (02/06/21 7173)  Respirations: 20 (02/06/21 0351)  Height: 5' (152 4 cm) (02/02/21 1421)  Weight - Scale: 38 9 kg (85 lb 12 1 oz) (02/02/21 1421)  SpO2: 98 % (02/06/21 4360)  Exam:   Physical Exam  Vitals signs and nursing note reviewed  Constitutional:       General: She is not in acute distress  Appearance: She is not ill-appearing  HENT:      Head: Normocephalic  Mouth/Throat:      Mouth: Mucous membranes are moist    Cardiovascular:      Rate and Rhythm: Normal rate and regular rhythm  Heart sounds: No murmur  Pulmonary:      Effort: Pulmonary effort is normal       Breath sounds: Normal breath sounds  No wheezing  Abdominal:      Tenderness: There is no abdominal tenderness  Musculoskeletal:      Right lower leg: No edema  Left lower leg: No edema     Skin:     General: Skin is warm  Neurological:      Mental Status: Mental status is at baseline  Comments: Nonverbal          Discussion with Family: discussed with patient's father  Discharge instructions/Information to patient and family:   See after visit summary for information provided to patient and family  Provisions for Follow-Up Care:  See after visit summary for information related to follow-up care and any pertinent home health orders  Disposition:   Home      Planned Readmission: none     Discharge Statement:  I spent 45 minutes discharging the patient  This time was spent on the day of discharge  I had direct contact with the patient on the day of discharge  Greater than 50% of the total time was spent examining patient, answering all patient questions, arranging and discussing plan of care with patient as well as directly providing post-discharge instructions  Additional time then spent on discharge activities  Discharge Medications:  See after visit summary for reconciled discharge medications provided to patient and family        ** Please Note: This note has been constructed using a voice recognition system **

## 2021-02-06 NOTE — ASSESSMENT & PLAN NOTE
Likely due to sedative effects of IV ativan given for seizure activity  Patient more alert/awake today  heart rate and pulse oximetry monitoring on Alexis remained stable    Blood pressure was transiently low and has improved

## 2021-02-06 NOTE — CASE MANAGEMENT
Pt is being discharged today  Pt will return to   [place] [address] [place]  [address] [facility] ambulance will transport the patient  [facility] notified that patient will be returning today   A copy of AVS sent with patient

## 2021-02-06 NOTE — PLAN OF CARE
Problem: Potential for Falls  Goal: Patient will remain free of falls  Description: INTERVENTIONS:  - Assess patient frequently for physical needs  -  Identify cognitive and physical deficits and behaviors that affect risk of falls    -  [place] fall precautions as indicated by assessment   - Educate patient/family on patient safety including physical limitations  - Instruct patient to call for assistance with activity based on assessment  - Modify environment to reduce risk of injury  - Consider OT/PT consult to assist with strengthening/mobility  Outcome: Progressing     Problem: Prexisting or High Potential for Compromised Skin Integrity  Goal: Skin integrity is maintained or improved  Description: INTERVENTIONS:  - Identify patients at risk for skin breakdown  - Assess and monitor skin integrity  - Assess and monitor nutrition and hydration status  - Monitor labs   - Assess for incontinence   - Turn and reposition patient  - Assist with mobility/ambulation  - Relieve pressure over bony prominences  - Avoid friction and shearing  - Provide appropriate hygiene as needed including keeping skin clean and dry  - Evaluate need for skin moisturizer/barrier cream  - Collaborate with interdisciplinary team   - Patient/family teaching  - Consider wound care consult   Outcome: Progressing     Problem: PAIN - ADULT  Goal: Verbalizes/displays adequate comfort level or baseline comfort level  Description: Interventions:  - Encourage patient to monitor pain and request assistance  - Assess pain using appropriate pain scale  - Administer analgesics based on type and severity of pain and evaluate response  - Implement non-pharmacological measures as appropriate and evaluate response  - Consider cultural and social influences on pain and pain management  - Notify physician/advanced practitioner if interventions unsuccessful or patient reports new pain  Outcome: Progressing     Problem: INFECTION - ADULT  Goal: Absence or prevention of progression during hospitalization  Description: INTERVENTIONS:  - Assess and monitor for signs and symptoms of infection  - Monitor lab/diagnostic results  - Monitor all insertion sites, i e  indwelling lines, tubes, and drains  - Administer medications as ordered  - Instruct and encourage patient and family to use good hand hygiene technique  - Identify and instruct in appropriate isolation precautions for identified infection/condition  Outcome: Progressing     Problem: SAFETY ADULT  Goal: Patient will remain free of falls  Description: INTERVENTIONS:  - Assess patient frequently for physical needs  -  Identify cognitive and physical deficits and behaviors that affect risk of falls  -  [place] fall precautions as indicated by assessment   - Educate patient/family on patient safety including physical limitations  - Instruct patient to call for assistance with activity based on assessment  - Modify environment to reduce risk of injury  - Consider OT/PT consult to assist with strengthening/mobility  Outcome: Progressing     Problem: DISCHARGE PLANNING  Goal: Discharge to home or other facility with appropriate resources  Description: INTERVENTIONS:  - Identify barriers to discharge w/patient and caregiver  - Arrange for needed discharge resources and transportation as appropriate  - Identify discharge learning needs (meds, wound care, etc )  - Refer to Case Management Department for coordinating discharge planning if the patient needs post-hospital services based on physician/advanced practitioner order or complex needs related to functional status, cognitive ability, or social support system  Outcome: Progressing     Problem: Nutrition/Hydration-ADULT  Goal: Nutrient/Hydration intake appropriate for improving, restoring or maintaining nutritional needs  Description: Monitor and assess patient's nutrition/hydration status for malnutrition   Collaborate with interdisciplinary team and initiate plan and interventions as ordered  Monitor patient's weight and dietary intake as ordered or per policy  Utilize nutrition screening tool and intervene as necessary  Determine patient's food preferences and provide high-protein, high-caloric foods as appropriate       INTERVENTIONS:  - Monitor oral intake, urinary output, labs, and treatment plans  - Assess nutrition and hydration status and recommend course of action  - Evaluate amount of meals eaten  - Assist patient with eating if necessary   - Allow adequate time for meals  - Recommend/ encourage appropriate diets, oral nutritional supplements, and vitamin/mineral supplements  - Order, calculate, and assess calorie counts as needed  - Recommend, monitor, and adjust tube feedings and TPN/PPN based on assessed needs  - Assess need for intravenous fluids  - Provide specific nutrition/hydration education as appropriate  - Include patient/family/caregiver in decisions related to nutrition  Outcome: Progressing     Problem: NEUROSENSORY - ADULT  Goal: Remains free of injury related to seizures activity  Description: INTERVENTIONS  - Maintain airway, patient safety  and administer oxygen as ordered  - Monitor patient for seizure activity, document and report duration and description of seizure to physician/advanced practitioner  - If seizure occurs,  ensure patient safety during seizure  - Reorient patient post seizure  - Seizure pads on all 4 side rails  - Instruct patient/family to notify RN of any seizure activity including if an aura is experienced  - Instruct patient/family to call for assistance with activity based on nursing assessment  - Administer anti-seizure medications if ordered    Outcome: Progressing

## 2021-02-06 NOTE — ASSESSMENT & PLAN NOTE
Low grade fever noted with tachycardia on admission  Also transient lactic acidosis, hypotension noted  Possible viral syndrome  Recent covid positive on 1/1/21  Taken off contact precautions at nursing home  CT chest, abdomen and pelvis is negative for acute infection  UA appears unremarkable  No distinct source of infection, but given limited history the patient was initiated on IV rocephin and IV Vancomycin, this was discontinued 2/4 given blood cultures are negative at 48 hours  Procalcitonin <0 05 x 2  Remained afebrile throughout her stay  Suspect possible viral etiology

## 2021-02-06 NOTE — ASSESSMENT & PLAN NOTE
Blood pressure continues to be low normal  I spoke to nursing at [facility], patient's blood pressure usually runs in the 56'N systolic, occasionally in the upper 80's  Blood pressure decreased into the 70's overnight 2/4 and continues to be in the 70's 2/5  This has now improved following IV fluid hydration, BP 95/61 this morning

## 2021-02-06 NOTE — ASSESSMENT & PLAN NOTE
Reports of multiple breakthrough seizures at the nursing home on day of admission  Maintained on seizure precautions while here  Monitored for other possible causes of breakthrough seizure - could be viral syndrome with possible dehydration serving as a trigger for breakthrough seizure  Continue Onfi 10 mg HS and added 5 mg qam as noted by neurology  Continue Bnazel, topamax, brivaractem as previously taking  Neurology consult appreciated- no further inpatient neurologic workup recommended at this time  Infectious workup negative for far  No additional fevers noted  Blood cultures negative x 2  No source for infection identified

## 2021-02-07 LAB
BACTERIA BLD CULT: NORMAL
BACTERIA BLD CULT: NORMAL

## 2021-02-10 ENCOUNTER — PATIENT OUTREACH (OUTPATIENT)
Dept: CASE MANAGEMENT | Facility: OTHER | Age: 41
End: 2021-02-10

## 2021-02-10 NOTE — PROGRESS NOTES
Patient identified as New Medicare Bundle through report  Email with bundle communication tool sent to facility with bundle dates, DRG, and LOS  This care manager assistant will continue to monitor via chart and CarePort review throughout bundle episode  This CM Assistant received a email from Amber Casiano with a update on the patient  The patient is skilled due to her seizures  She is at baseline  This care manager assistant will continue to monitor via chart review throughout bundle episode

## 2021-02-15 ENCOUNTER — TELEPHONE (OUTPATIENT)
Dept: NEUROLOGY | Facility: CLINIC | Age: 41
End: 2021-02-15

## 2021-02-15 NOTE — TELEPHONE ENCOUNTER
[facility] and left a voicemail message for the  about setting patient up for a video visit  I left my name and number for him to return my call

## 2021-02-17 ENCOUNTER — PATIENT OUTREACH (OUTPATIENT)
Dept: CASE MANAGEMENT | Facility: OTHER | Age: 41
End: 2021-02-17

## 2021-02-17 NOTE — PROGRESS NOTES
I called [facility] and spoke with patient's caregiver  She reports patient has not had any seizures since discharge and she is at baseline

## 2021-02-18 ENCOUNTER — TELEPHONE (OUTPATIENT)
Dept: NEUROLOGY | Facility: CLINIC | Age: 41
End: 2021-02-18

## 2021-02-18 NOTE — TELEPHONE ENCOUNTER
Arianne james from [facility]  Patient's last dose of Epidiolex was last evening and they will not receive this until tomorrow from [facility] until tomorrow d/t the snow  They just wanted to let you know that patient will miss two doses of medication  Patient will have all her other AED's and they do have diazepam on hand      Dr Natacha Tabor, [facility]

## 2021-02-18 NOTE — TELEPHONE ENCOUNTER
She is already on Onfi, so diazepam will be doing the same thing as Onfi  If there is an increase cluster of seizures then given Diazepam 1mL as needed for seizure clusters should she not have Epidiolex available  There is no straight forward substitution for Epidiolex

## 2021-02-19 ENCOUNTER — TELEPHONE (OUTPATIENT)
Dept: NEUROLOGY | Facility: CLINIC | Age: 41
End: 2021-02-19

## 2021-02-19 NOTE — TELEPHONE ENCOUNTER
Spoke to Danna with [employer]  Informed her of previous  Verbalized understanding with recommendations  Nothing further at this time

## 2021-02-19 NOTE — TELEPHONE ENCOUNTER
Called [facility] and spoke with Herve Douglass,  and informed her that the patient was scheduled for a video visit on Monday, 2/22/2021 4PM  She stated that she will pass the message onto the person Leonel De Jesus who handles virtual visits  Awaiting for Lev Edward to return call so Teams e-mail can be sent  Will also need to complete registration/screening questions and see if a facesheet and med list can be faxed

## 2021-02-22 ENCOUNTER — TELEMEDICINE (OUTPATIENT)
Dept: NEUROLOGY | Facility: CLINIC | Age: 41
End: 2021-02-22
Payer: MEDICARE

## 2021-02-22 VITALS
SYSTOLIC BLOOD PRESSURE: 104 MMHG | TEMPERATURE: 97.8 F | BODY MASS INDEX: 16.48 KG/M2 | HEART RATE: 76 BPM | HEIGHT: 63 IN | RESPIRATION RATE: 20 BRPM | WEIGHT: 93 LBS | DIASTOLIC BLOOD PRESSURE: 68 MMHG

## 2021-02-22 DIAGNOSIS — G40.812 LENNOX-GASTAUT SYNDROME WITH TONIC SEIZURES (HCC): ICD-10-CM

## 2021-02-22 DIAGNOSIS — G40.813 INTRACTABLE LENNOX-GASTAUT SYNDROME WITH STATUS EPILEPTICUS (HCC): Primary | ICD-10-CM

## 2021-02-22 PROBLEM — R65.10 SIRS (SYSTEMIC INFLAMMATORY RESPONSE SYNDROME) (HCC): Status: RESOLVED | Noted: 2021-02-02 | Resolved: 2021-02-22

## 2021-02-22 PROCEDURE — 99215 OFFICE O/P EST HI 40 MIN: CPT | Performed by: PSYCHIATRY & NEUROLOGY

## 2021-02-22 RX ORDER — RUFINAMIDE 400 MG/1
1600 TABLET, FILM COATED ORAL EVERY 12 HOURS
Qty: 240 TABLET | Refills: 5 | Status: SHIPPED | OUTPATIENT
Start: 2021-02-22 | End: 2021-07-08 | Stop reason: SDUPTHER

## 2021-02-22 RX ORDER — CLOBAZAM 10 MG/1
TABLET ORAL
Qty: 45 TABLET | Refills: 5 | Status: SHIPPED | OUTPATIENT
Start: 2021-02-22 | End: 2021-07-08

## 2021-02-22 RX ORDER — BRIVARACETAM 10 MG/ML
50 SOLUTION ORAL EVERY 12 HOURS
Qty: 300 ML | Refills: 5 | Status: SHIPPED | OUTPATIENT
Start: 2021-02-22 | End: 2021-07-08 | Stop reason: SDUPTHER

## 2021-02-22 RX ORDER — TOPIRAMATE 50 MG/1
250 TABLET, FILM COATED ORAL EVERY 12 HOURS SCHEDULED
Qty: 300 TABLET | Refills: 5 | Status: SHIPPED | OUTPATIENT
Start: 2021-02-22 | End: 2021-07-08 | Stop reason: SDUPTHER

## 2021-02-22 NOTE — PROGRESS NOTES
Virtual Regular Visit         Reason for visit is management of epilespy    Provider located at [address] [place] [id number]    Recent Visits  Date Type Provider Dept   21 Fransisco Wilde MD [facility]   Showing recent visits within past 7 days and meeting all other requirements     Future Appointments  No visits were found meeting these conditions  Showing future appointments within next 150 days and meeting all other requirements       The patient was identified by name and date of birth  The patient is located at [facility] Jasbir Kirbysaadia's caretaker(s) was informed that this is a telemedicine visit and that the visit is being conducted through [employer] and patient was informed that this is a secure, HIPAA-compliant platform  She agrees to proceed     My office door was closed  No one else was in the room  They acknowledged consent and understanding of privacy and security of the video platform  The patient's caretaker has agreed to participate and understands they can discontinue the visit at any time  Patient's caretaker is aware this is a billable service  VIRTUAL VISIT Tino Bull's caretaker(s) acknowledges that they have consented to an online visit or consultation  They understand that the online visit is based solely on information provided by them, and that, in the absence of a face-to-face physical evaluation by the physician, the diagnosis she receives is both limited and provisional in terms of accuracy and completeness  This is not intended to replace a full medical face-to-face evaluation by the physician  Chacorta Bull's caretaker(s) understand/s and accept/s these terms        [address] [facility]  Patient's Name: Liu Moreno   Patient's : 1980   Visit Type: follow-up  Referring MD / PCP:  Jose Miguel Nuñez DO     Assessment:  Ms  Jaydon oDe is a 45 y o  woman with intractable Lennox Gastaut Syndrome, progressive encephalopathy, continuous EEG monitoring frequent finds innumerable number of tonic seizures despite multiple medication trials and VNS therapy  There is a remote history of anoxic brain injury (however her MRI brain study is normal)  Her EEGs has features of both generalized and focal epilepsy; her focal seizures seem to occur from the bilateral temporal regions with rhythmic discharges, but no apparent clinical symptoms  Her sleep pattern seems to follow a non-24 hours circadian rhythm  Her clobazam level is reasonable and less likely to be the cause of her sedation  She is more awake for this visit  She is fairly interactive  There does not seem to be an increase in seizure activity with decrease of Brivaracetam   I will continue to reduce the dose of Brivaracetam with plan on transitioning to another AED; she has not had a trial of Vimpat, which has recently received approval for adjunctive treatment of generalized tonic clonic seizures  Plan:   Medications are given by PEG  1 - increase Epidiolex 100mg/mL to 4 5 mL (450mg) every 12 hours (Goes to [facility] specialty pharmacy)  2 - decrease Briviact 10mg/mL to 5mL (50mg) every 12 hours  3 - continue with Topiramate 250mg every 12 hours  4 - continue Banzel 400mg give 4 tabs every 12 hours  5 - continue Onfi 10mg tab give half a tab at at 9AM and 1 tab at bedtime  6 - may use diazepam 5mg/mL oral solution, give 1mL by peg tube as needed for generalized tonic clonic seizure lasting more than 5 minutes  Will consider dispensing diazepam 5mg nasal spray (Valtoco), will need to investigate if the medication is still dispensed by specialty pharmacy or if we can prescribe to a local pharmacy  7 - follow-up in 4 months;  At some point this year, we will need patient to come into the office so that the VNS can be interrogated; the last time her VNS was interrogated was a year ago       Problem List Items Addressed This Visit        Nervous and Auditory    Intractable epilepsy with status epilepticus ([facility] [address] ) - Primary    Relevant Medications    cannabidiol (Epidiolex) 100 mg/ml SOLN    rufinamide (BANZEL) 400 mg tablet    topiramate (TOPAMAX) 50 MG tablet    Brivaracetam (Briviact) 10 MG/ML SOLN oral solution    cloBAZam (ONFI) 10 MG tablet      Other Visit Diagnoses     Lennox-Gastaut syndrome with tonic seizures (HCC)        Relevant Medications    cannabidiol (Epidiolex) 100 mg/ml SOLN    rufinamide (BANZEL) 400 mg tablet    topiramate (TOPAMAX) 50 MG tablet    Brivaracetam (Briviact) 10 MG/ML SOLN oral solution    cloBAZam (ONFI) 10 MG tablet        Chief Complaint:    Chief Complaint     Virtual Regular Visit; Seizures        HPI:    Liu Moreno is a 45 y  o female here for follow-up evaluation of intractable epilepsy in the setting of LGS  Interval History 2/22/2021  She was admitted to Hospital on 2/2/2021 for multiple seizures in one day  Nekoma Chimera was increased with an extra 5mg tab in AM   With the increase in Nekoma Chimera, there has been no report of excessive sedation  She appears to be acting her normal self  She continues to have stretches of non-24 hour sleep cycle  She does always appear to be active, smiling and laughter  She was COVID19 positive back in January 2021, but she was not symptomatic  There was a couple of days when she did not have Epidiolex due to the weather, but there was no worsening of her seizures  There have been no report of diarrhea  Other seizures including with her arms raised up and stay up  There is mild improvement with current medications, less frequent reports of seizures  AED/side effects/compliance:  Banzel 400mg give 4 tabs twice a day   Topiramate 250mg tab twice a day   Epidiolex 400mg twice a day  Briviact 10mg/mL 7 5mL twice a day  Onfi 5-10    Event/Seizure semiology:  Seizure semiology:  1   She was described to have drop attacks or spells with grunting sounds and falling to the floor  2  Her nurse commented on episodes of spasms or myoclonic jerking of the right arm  3  Generalized convulsions  4  Tonic seizures of eyes rolling back (mostly during sleep)    Prior Epilepsy History:  Collective epilepsy history 11/6/2014 was previously evaluated by Dr Génesis Parham including a hospitalization in February 2013 for status epilepticus, in the setting of missed doses of AEDs due to refusal to eat  She subsequently required anesthetic induced coma to stop her seizures and PEG tube was placed  She was seen almost every month for management of her seizures up until November 2013  She has medically refractory seizures and had a VNS placed possibly in the early 2000s and a generator changed in 2011  Unknown AEDs that were tried but felbamate is listed as an allergy  In 2011, her AEDs were clonazepam, levetiracetam, Depakote and Lamictal  Dr Génesis Parham had started [facility] in 2011  In 2012, Izzy Primus was added with the reduction of clonazepam  There were difficulty with documenting seizure frequency; but seizures were no longer daily occurrences but there were clusters of seizures  There would be good periods and bad periods of seizure frequency  Dr Génesis Parham had been increasing the duty cycle of the VNS over the course of 2012 to 2013  Sometime between August 2013 and October 2013, topiramate was introduced  She was in status epilepticus in 2013, she was on Keppra, Banzel, Onfi, and Lamictal  She had an EEG at some point that showed multifocal spike-wave and background attenuation  She has intermittent agitation and day time somnolence  When she was hospitalized for status epilepticus, she was started on methylphenidate to help wake her up  Intake history November 2014:  VPA level 127  Her Valproic Acid dose was previously 250mg q6hrs based on Dr Adamaris Carcamo notes  Since July 2014, she has been receiving VPA 500mg q6hrs   She has not been hospitalized since September 2013  She was previously ambulatory with therapy  She spends most of her time sleeping for the past couple of months  She has also been receiving lactulose for elevated ammonia levels  (older drugs VPA, LVT, LMG, newer drugs added on since 2011 RFN, Onfi, TPM)     Summary of 2015: We decreased VPA from TDD 2000mg to 1000mg with increased agitation/combativeness, alternating days of exhaustion (no behavioral specialist has been involved)  are randomly reported by multiple staff members  Seizures are typically reported by CNA's or her one to one on the 3PM to 11PM shift  Seizures are described a brief events of eyes rolling back and arms going up in the air, followed by hair pulling or slapping herself  These seizures were reported as either sporadic or every other day episodes  There have been no documented grand mal seizures or drop attacks  She refuses to wear safety helmet, rips at her hair, and attempts to flip herself out of her chair and bed  Adrian Caves can walk briefly but walks on her toes, she frequently will allow herself to fall down when she does not want to walk  It does not appear that methylphenidate has been useful in maintaining her level of wakefulness during the day  VNS generator change on 11/3/2015  The Model 103 (serial [id number]) was replaced with Cloud9 IDE Model 105, serial [id number]  Weaned off of levetiracetam due to multiple medications and agitation; by the end of 2016, she was down to -500  Summary of 2016:  Started with RFN 4566-4950, -250, CLB 0 5-0 5-25,  QID, -200 attempted to wean off of LEV and reduced the dose of VPA given that there were no reports of seizures and she was sedated, along with elevated ammonia levels  It was unclear as to how effective LEV was for her seizure control  When she missed a dose of TPM in September 2016, she had multiple seizures    We attempted to compensate for increase in seizures by increasing Onfi to 20-20; however, it seems that it made her more sedated  Summary of 2017  RFN [id number], -250, Onfi 20-20, -200,  q8hrs  She has been more somnolent  She continues to have tonic seizures when she is asleep, eyes rolling body, arms will tense up with some twitching, last a few seconds, but will recur  There are no seizures during the daytime  We attempted to wean off of VPA due to ammonia / somnolence; but there was an increase in tonic seizures (tonic stiffness, eyes rolling upwards, and subtle arm (right?) jerking)  Hospital admission 10/10-10/26/2017, due to seizures in setting of infection, was put on continuous video EEG monitoring to determine her seizure type, seizure frequency, and rapid medication adjustments  Rapid med changes: d/c'ed lamotrigine, restarted levetiracetam, attempted discontinuation of valproic acid (more seizures, so restarted), attempted reduction in Onfi, and starting Fycompa  The patient's seizures were mostly based during sleep, tonic seizures  Summary of 2018  Started with RFN [id number], PER 8, LEV 6958-8060, CLB 5-15, -250,  TID  Due to excessive somnolence Onfi was weaned off  She was on continuous EEG monitoring when she was in hospital for PNA that showed very frequent tonic seizures during sleep  VPA is causing hyperammoniemia  She has had more admissions for somnolence / lethargy, VPA was reduced for transaminitis  She was tested for inborn error of metabolism with plasma amino acid, urine organic acid, and acylcarnitine levels  There were nonspecific elevations in some amino acid or organic acid but no pattern consistent with a specific inborn error of metabolism  Elevated carnitine level was consistent with supplementation  Higher doses of phenobarbital may also contribute to sedation, phenobarbital was reduced to 20mg but on follow-up she was on 20mL of oral solution (80mg / day)      There is variable reporting in the frequency of tonic seizures (these are the eyes are rolling back and shaking, then stop, then happen again in 10-15 minutes)  Her level of alertness is also variable with erratic sleep cycle  Summary 2019  We started the year with  TID, RFN 4665-1691, -200, LEV 3130-3756, PER 10, PB 40 qHS  In December 2018, Hospital admission for septic shock and aspiration PNA  She had an EEG that captured recurrent tonic seizures during sleep but this is consistently found in all of her other EEG studies  Another hospital admission January 2019 for recurrent convulsive seizures  Due to sedation Phenobarbital was weaned off and Fycompa was increased to 10mg at bedtime  TPM increased to 250-250  Tried to wean off of VPA due to ammonia levels  We started Epidiolex in the Spring 2019, which seems to have improved seizure control  January 2019 - Her CNA reports that Gary Chance is no longer Gary Chance, she is essentially bed ridden  She does not eat and does not walk  Gary Chance usually participate in activities, walking, and eating food (if she was at a [facility] she would be able to eat a hamburger without difficulty)  I was able to speak to her father Sanju Kulkarni and he reported that he too saw a significant decline in mental status  There was a phone call from Nahid Tsai reporting an increase in seizure activity (brief episodes tonic-myoclonic jerking); but subsequent reports did not notice an increase in activity  Weaned off of phenobarbital, suspecting that it was causing elevated transaminases  There is variable periods of wakefulness and alertness; seizures are reported sporadically, sometimes she seems to have clusters of seizures (during an office visit there were about 8 tonic seizures lasting 15-20 seconds)  We weaned her off of valproic acid and increased Epidiolex  June 2019, hospitalized for status epilepticus (multiple clinical seizures, every 5-6 minutes, associated with apnea)  Continuous EEG monitoring showed very frequent 15-30 seconds tonic seizures , whether this is different from her baseline is difficult to determine  Since she was on continuous EEG monitoring a number of medication trials were given  At one point it seems that lorazepam and more levetiracetam had improved seizures  Her tube feed was adjust to more protein and lower carbohydrates  2019 - multiple phone calls regarding recurrent seizures (generalized shaking, tonic body rigidity and clonic activity)    Summary 2020  RUF [id number], -250, LEV 1000 q8hr, -400, JESSICA 50-50, CLB 5 qHS  Replacement of her VNS in 2019, [facility] [address] [facility]  Seizure reporting had been sporadic  Despite medication adjustments, there has been no improvement in degree of wakefulness, nonverbal, calls out at times  She does not do much  Transitioned LEV to Brivaracetam   Increased clobazam to 10mg at bedtime on 2020  Unclear if there has been improvement in seizure frequency; but there were no admissions to hospital for seizures  Special Features  Status epilepticus: yes  Self Injury Seizures: No  Precipitating Factors: menstrual cycle? ?     Epilepsy Risk Factors:  Abnormal pregnancy: unknown  Abnormal birth/: unknown  Abnormal Development: unknown developmental history  Febrile seizures, simple: unknown  Febrile seizures, complex: unknown  CNS infection: No  Mental retardation: Yes  Cerebral palsy: Yes  Head injury (moderate/severe): possibly anoxic brain injury  CNS neoplasm: No  CNS malformation: No  Neurosurgical procedure: No  Stroke: No  Alcohol abuse: No  Drug abuse: No  Family history Sz/epilepsy: unknown    Prior AEDs:  Rufinamide  Clobazam (excessive sedation)  Clonazepam  Levetiracetam (unclear if beneficial)  Lamotrigine (unclear if beneficial)  Topiramate (missed doses caused breakthrough convulsive type of seizures)  Valproate (elevated ammonia levels)  Fycompa (excess sedation)  Felbamate (listed as a drug allergy)  Phenobarbital (contributing to sedation)  Epidiolex (seems to help the most)  Brivaracetam    Prior workup:  x   Imaging:  CT head 2/21/2013, 9/7/2018  No acute intracranial pathology    3/20/2019  MRI brain w/wo contrast  Normal MR brain study  Symmetric hippocampal formations    EEGs:  Continuous video EEG monitoring 10/13 - 10/15/2017  Frequent generalized spike/polyspike-slow wave complexes during sleep  At times there are independent right more than left midtemporal spikes and bitemporal spikes    Innumerable generalized tonic seizures during sleep, generalized 1 Hz period discharges, that would become continuous for 30 seconds or generalized rhythmic alpha-beta discharges, associated with patient becoming rigid, tonic posturing with arms elevated and tense with subtle jerking of the upper body, eyes opening with upward deviation  Ictal patterns:  1 - Seconds of diffuse electrodecrement then paroxysmal fast activity followed by 2Hz spike wave discharges (clinically accompanied by posturing of the arms, then clonic jerking)    Continuous video EEG monitoring 10/18 - 10/25/2017  Diffuse background slowing with bursts of arrhythmic generalized spike wave discharges, with shifting lateralization, and independent left and  right temporal spikes  Mild eyelid myoclonia associated with brief bursts of 2-2 5 Hz generalized discharges  Tonic seizures with eelctrodecrement  There were innumerable tonic seizures; however by 10/25/2017 the frequency of these seizures did decrease in frequency  Continuous video EEG monitoring 12/1-12/5/2017  There are innumerable tonic seizures that are generally less than one minute in duration (30-40 seconds), these consists of subtle eye opening and tonic stiffening of arms, if longer then whole body stiffening with clonic jerking movements  These almost always occur exclusively out of sleep    These consist of 2-2 5 Hz generalized spike-slow wave complexes with generalized attenuation of activity (desynchronization) or paroxysmal fast activity  Per 24 hours count of seizures could be      12/19/2018 routine study  Frequent recurrent tonic seizures associated with paroxysmal generalized fast activity then generalized rhythmic discharges associated with eyes upward deviation, and myoclonic/clonic jerking of the upper body, excess beta activity  6/28-7/3/2019 continuous video EEG monitoring  Frequent clusters of tonic seizures (body rigidity, head flexions, eyes go up with dysconjugate gaze, and clonic activity of the arms for a few seconds), these are associated with GPFA and rhythmic spike-slow waves  There are also bilateral temporal focal epileptiform discharges      Labs:  7/14/2020  Clobazam 37ng/mL  N-Desmethylclobazam 1,585 ng/mL    5/21/2020  Topiramate 7 5mcg/mL    Component      Latest Ref Rng & Units 2/2/2021 2/5/2021   WBC      4 31 - 10 16 Thousand/uL 7 85 5 29   Red Blood Cell Count      3 81 - 5 12 Million/uL 4 08 3 56 (L)   Hemoglobin      11 5 - 15 4 g/dL 13 2 11 6   HCT      34 8 - 46 1 % 41 2 36 5   Platelet Count      460 - 390 Thousands/uL 161 154   Sodium      136 - 145 mmol/L 144 142   Potassium      3 5 - 5 3 mmol/L 3 7 3 5   Chloride      100 - 108 mmol/L 108 108   CO2      21 - 32 mmol/L 22 20 (L)   Anion Gap      4 - 13 mmol/L 14 (H) 14 (H)   BUN      5 - 25 mg/dL 22 8   Creatinine      0 60 - 1 30 mg/dL 0 63 0 44 (L)   Glucose, Random      65 - 140 mg/dL 159 (H) 87   Calcium      8 3 - 10 1 mg/dL 9 4 8 4   CORRECTED CALCIUM      8 3 - 10 1 mg/dL  9 1   AST      5 - 45 U/L 53 (H) 32   ALT      12 - 78 U/L 95 (H) 71   Alkaline Phosphatase      46 - 116 U/L 121 (H) 93   Total Protein      6 4 - 8 2 g/dL 8 2 6 4   Albumin      3 5 - 5 0 g/dL 3 7 3 1 (L)   TOTAL BILIRUBIN      0 20 - 1 00 mg/dL 0 30 0 30   eGFR      ml/min/1 73sq m 113 128   Total CK      26 - 192 U/L 107        Video Exam  General exam   Blood pressure 104/68, pulse 76, temperature 97 8 °F (36 6 °C), temperature source Tympanic, resp  rate 20, height 5' 3" (1 6 m), weight 42 2 kg (93 lb)  Appearance: she is sitting in her wheelchair with her head slumped over, no meaningful interaction when the nurse tries to wake her up  Carotids: unable to assess  Cardiovascular: unable to assess on virtual visit  Pulmonary: unable to assess on virtual visit     HEENT: unable to visualize on video exam   Fundoscopy: not assessed    Mental status  Orientation: excessively sedated  Fund of Knowledge: nonverbal   Attention and Concentration: nonverbal  Current and Remote Memory:nonverbal  Language: nonverbal    Cranial Nerves  CN 1: not tested  CN 2: unable to assess on virtual visit   CN 3, 4, 6: unable to assess on virtual visit  CN 5:not assessed  CN 7:muscles of facial expression are symmetric  CN 8:not assessed  CN 9, 10:not assessed  CN 11:not assessed  CN 12:not assessed    Motor:  Bulk, Tone: unable to assess on virtual visit  Pronation: not assessed  Strength:  The nurse raised both arms up over the patient's head, there was a momentary period when the arms are held up but then they symmetrically come down to her side    Sensory:  Lighttouch: unable to assess on virtual visit    Coordination: unable to assess on virtual visit    Reflexes: not assessed    Past Medical/Surgical History:  Patient Active Problem List   Diagnosis    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[facility] [address] )    Underweight    Osteoporosis    Onychomycosis    Hyperkeratosis    Cerebral anoxic injury ([facility] [address] )    Intractable epilepsy with status epilepticus ([facility] [address] )    Somnolence    Low blood pressure    Incontinence    Skin breakdown    MRSA colonization    Right atrial enlargement    Hypophosphatemia    Sedated due to multiple medications    Breast mass    S/P placement of VNS (vagus nerve stimulation) device    Protein calorie malnutrition ([facility] [address] )    Dislodged gastrostomy tube    Fatty infiltration of liver    Intellectual disability with epilepsy ([facility] [address] [facility]    Labyrinthine disorder    Labial cyst    PEG tube malfunction ([facility] [address] )     Past Surgical History:   Procedure Laterality Date    ABDOMINAL SURGERY      CARDIAC PACEMAKER PLACEMENT      vns implant l chest    IR GASTROSTOMY TUBE PLACEMENT  11/21/2019    IR THORACENTESIS  12/17/2018    JEJUNOSTOMY FEEDING TUBE      history of - most recently, PEG tube    PEG TUBE PLACEMENT      NM IMP STIM,CRANIAL,SUBQ,1 ARRAY Left 12/18/2019    Procedure: REPLACEMENT IMPLANTABLE PULSE GENERATOR FOR VAGAL NERVE STIMULATOR, LEFT CHEST;  Surgeon: Kitty Cardona MD;  Location: [facility];  Service: Neurosurgery     Past Psychiatric History:  History of behavioral agitation but she is no longer on a one to one because she is generally too sedated      Medications:    Current Outpatient Medications:     acetaminophen (TYLENOL) 325 mg tablet, Take 650 mg by mouth every 6 (six) hours as needed for mild pain or fever, Disp: , Rfl:     bisacodyl (BISCOLAX) 10 mg suppository, Insert 10 mg into the rectum daily at bedtime as needed for constipation (if no BM day #4), Disp: , Rfl:     bisacodyl (FLEET BISACODYL) 10 MG/30ML ENEM, Insert 10 mg into the rectum as needed for constipation Give at hs on day 5 if suppository is not effective, Disp: , Rfl:     Brivaracetam (Briviact) 10 MG/ML SOLN oral solution, 5 mL (50 mg total) by Per PEG Tube route every 12 (twelve) hours, Disp: 300 mL, Rfl: 5    cannabidiol (Epidiolex) 100 mg/ml SOLN, 4 5 mL (450 mg total) by Per G Tube route 2 (two) times a day, Disp: 270 mL, Rfl: 5    cloBAZam (ONFI) 10 MG tablet, Give half a tab at 9AM and one tab at bedtime by G Tube, Disp: 45 tablet, Rfl: 5    diazepam (VALIUM) 5 MG/ML solution, Give 1mL by PEG tube PRN generalized tonic clonic seizure longer than 5 minutes or continuous multiple tonic seizures over 30 minutes, Disp: 30 mL, Rfl: 0    magnesium hydroxide (MILK OF MAGNESIA) 400 mg/5 mL oral suspension, Take 30 mL by mouth daily as needed for constipation If not BM by day 3, Disp: , Rfl:     MELATONIN PO, 6 mg by Per G Tube route daily at bedtime, Disp: , Rfl:     Multiple Vitamins-Minerals (MULTIVITAMIN WITH IRON-MINERALS) liquid, 5 mL by Per G Tube route daily, Disp: , Rfl:     Probiotic Product (ALEXANDER-BID PROBIOTIC PO), 1 tablet by Per G Tube route daily  , Disp: , Rfl:     rufinamide (BANZEL) 400 mg tablet, 4 tablets (1,600 mg total) by Per G Tube route every 12 (twelve) hours, Disp: 240 tablet, Rfl: 5    topiramate (TOPAMAX) 50 MG tablet, 5 tablets (250 mg total) by Per G Tube route every 12 (twelve) hours, Disp: 300 tablet, Rfl: 5    melatonin 3 mg, 6 mg by Per G Tube route daily at bedtime, Disp: , Rfl:     Allergies: Allergies   Allergen Reactions    Felbamate        Family history:  No family history on file  There is no family history of seizure, epilepsy or developmental delay  Social History  Living situation:  Resident of [facility]   reports that she has never smoked  She has never used smokeless tobacco  She reports previous alcohol use  She reports that she does not use drugs  Review of Systems  A review of at least 12 organ/systems was obtained by the medical assistant and reviewed by me, including additional positives/negatives:  Neurological: Positive for speech difficulty (non verbal)  Negative for dizziness, tremors, seizures, syncope, facial asymmetry, weakness, light-headedness, numbness and headaches  Psychiatric/Behavioral: Positive for sleep disturbance (at times)  Negative for confusion and hallucinations  Decision making was of high-complexity due to the patient's high risk condition (seizures), psychiatric and neuropsychological comorbidities, behavioral problems, memory and cognitive problems and medication side effects        The total amount of time spent with the patient along with pre-chart and post-chart preparation was 43 minutes on the calendar day of the date of service  This included history taking, physical exam, review of ancillary testing, counseling provided to the patient regarding diagnosis, medications, treatment, and risk management, and other communication to the patient's providers and/or family    Start time: 4:12PM  End time: 4:55PM

## 2021-02-22 NOTE — PATIENT INSTRUCTIONS
Plan:   Medications are given by PEG  1 - increase Epidiolex 100mg/mL to 4 5 mL (450mg) every 12 hours (Goes to [facility] specialty pharmacy)  2 - decrease Briviact 10mg/mL to 5mL (50mg) every 12 hours  3 - continue with Topiramate 250mg every 12 hours  4 - continue Banzel 400mg give 4 tabs every 12 hours  5 - continue Onfi 10mg tab give half a tab at at 9AM and 1 tab at bedtime  6 - may use diazepam 5mg/mL oral solution, give 1mL by peg tube as needed for generalized tonic clonic seizure lasting more than 5 minutes  Will consider dispensing diazepam 5mg nasal spray (Valtoco), will need to investigate if the medication is still dispensed by specialty pharmacy or if we can prescribe to a local pharmacy  7 - follow-up in 4 months   At some point this year, we will need patient to come into the office so that the VNS can be interrogated; the last time her VNS was interrogated was a year ago

## 2021-02-23 ENCOUNTER — TELEPHONE (OUTPATIENT)
Dept: NEUROLOGY | Facility: CLINIC | Age: 41
End: 2021-02-23

## 2021-02-23 NOTE — TELEPHONE ENCOUNTER
I called [employer]  At [phone] and spoke to Roberto Carlos  She reported that patient's can get medication at retail pharmacy or a specialty pharmacy ([address] phone #[phone])  There is a savings program through [employer] and there is a coupon card where patient can obtain medication for as low as $20  There is no start form needed  Epidiolex was sent to [facility]  I called [facility] and spoke to [phone]  I spoke to Keyshawn Lewis and she states Epidiolex 4mL BID was just shipped on 2/19/21  It is giving her notification of too early to fill  A way to find out if it needs a PA is to call insurance  Next avail date to fill medication is 3/12  [facility] is pharamacy benefits   ID# [id number]  phone# [phone]    Called above # and spoke to Erin  I made her aware of Epidiolex increase and questioned if it would require a PA  Epidiolex does not require a new PA  Connie Saravia does not require a PA either

## 2021-02-23 NOTE — TELEPHONE ENCOUNTER
----- Message from Shahida Ayala MD sent at 2/22/2021  4:54 PM EST -----  Regarding: Epidiolex dose increase / and Valtoco inquiry    I spoke with Juanjo Guerrero on 2/22/2021 in regards to [facility] inquiry  Please ask company if [facility] can be prescribed to a local pharmacy or if we still have to send a prescription to their preferred specialty pharmacy  I would like to prescribe Valtoco 5mg nasal spray to give one dose for recurrent seizures and may repeat in 4 hours  Also, I adjusted the dose of Epidiolex from 4mL to 4 5mL; please find out from patient's insurance company if prior authorization is needed for this dose increase so that appropriate amount is dispensed  I also decreased Briviact to 5mL, does this also require prior authorization?     Thanks    [employer]

## 2021-02-27 ENCOUNTER — HOSPITAL ENCOUNTER (EMERGENCY)
Facility: HOSPITAL | Age: 41
Discharge: HOME | End: 2021-02-27
Attending: EMERGENCY MEDICINE
Payer: MEDICARE

## 2021-02-27 ENCOUNTER — APPOINTMENT (EMERGENCY)
Dept: RADIOLOGY | Facility: HOSPITAL | Age: 41
End: 2021-02-27
Payer: MEDICARE

## 2021-02-27 VITALS
DIASTOLIC BLOOD PRESSURE: 79 MMHG | RESPIRATION RATE: 16 BRPM | HEIGHT: 63 IN | WEIGHT: 93.03 LBS | HEART RATE: 70 BPM | TEMPERATURE: 98.6 F | SYSTOLIC BLOOD PRESSURE: 111 MMHG | OXYGEN SATURATION: 100 % | BODY MASS INDEX: 16.48 KG/M2

## 2021-02-27 DIAGNOSIS — K94.23 PEG TUBE MALFUNCTION (HCC): Primary | ICD-10-CM

## 2021-02-27 PROCEDURE — 99283 EMERGENCY DEPT VISIT LOW MDM: CPT

## 2021-02-27 PROCEDURE — 99284 EMERGENCY DEPT VISIT MOD MDM: CPT | Performed by: EMERGENCY MEDICINE

## 2021-02-27 PROCEDURE — 43762 RPLC GTUBE NO REVJ TRC: CPT | Performed by: EMERGENCY MEDICINE

## 2021-02-27 PROCEDURE — 74018 RADEX ABDOMEN 1 VIEW: CPT

## 2021-02-27 RX ADMIN — DIATRIZOATE MEGLUMINE AND DIATRIZOATE SODIUM 20 ML: 660; 100 LIQUID ORAL; RECTAL at 15:36

## 2021-02-27 NOTE — ED PROVIDER NOTES
History  Chief Complaint   Patient presents with    Feeding Tube Problem     patient removed G tube; no other complaints      41-year-old female with a G-tube that has been dislodged  Presenting for replacement of G-tube  History is obtained from EMS and nursing home records  [facility]  Tract is patent and was recently removed  Feeding Tube Problem      Prior to Admission Medications   Prescriptions Last Dose Informant Patient Reported? Taking?    Brivaracetam (Briviact) 10 MG/ML SOLN oral solution   No No   Si mL (50 mg total) by Per PEG Tube route every 12 (twelve) hours   MELATONIN PO  Outside Facility (Specify) Yes No   Si mg by Per G Tube route daily at bedtime   Multiple Vitamins-Minerals (MULTIVITAMIN WITH IRON-MINERALS) liquid  Outside Facility (Specify) Yes No   Si mL by Per G Tube route daily   Probiotic Product (ALEXANDER-BID PROBIOTIC PO)  Outside Facility (Specify) Yes No   Si tablet by Per G Tube route daily     acetaminophen (TYLENOL) 325 mg tablet   Yes No   Sig: Take 650 mg by mouth every 6 (six) hours as needed for mild pain or fever   bisacodyl (BISCOLAX) 10 mg suppository  Outside Facility (Specify) Yes No   Sig: Insert 10 mg into the rectum daily at bedtime as needed for constipation (if no BM day #4)   bisacodyl (FLEET BISACODYL) 10 MG/30ML ENEM   Yes No   Sig: Insert 10 mg into the rectum as needed for constipation Give at hs on day 5 if suppository is not effective   cannabidiol (Epidiolex) 100 mg/ml SOLN   No No   Si 5 mL (450 mg total) by Per G Tube route 2 (two) times a day   cloBAZam (ONFI) 10 MG tablet   No No   Sig: Give half a tab at 9AM and one tab at bedtime by G Tube   diazepam (VALIUM) 5 MG/ML solution   No No   Sig: Give 1mL by PEG tube PRN generalized tonic clonic seizure longer than 5 minutes or continuous multiple tonic seizures over 30 minutes   magnesium hydroxide (MILK OF MAGNESIA) 400 mg/5 mL oral suspension  Outside Facility (Specify) Yes No   Sig: Take 30 mL by mouth daily as needed for constipation If not BM by day 3   melatonin 3 mg   Yes No   Si mg by Per G Tube route daily at bedtime   rufinamide (BANZEL) 400 mg tablet   No No   Si tablets (1,600 mg total) by Per G Tube route every 12 (twelve) hours   topiramate (TOPAMAX) 50 MG tablet   No No   Si tablets (250 mg total) by Per G Tube route every 12 (twelve) hours      Facility-Administered Medications: None       Past Medical History:   Diagnosis Date    ADHD     [facility] brain [facility] )     Autistic disorder     [facility]    Dysphagia     Dysphagia, oropharyngeal phase     Gastrostomy tube dependent ([facility] )     14 Fr as of 2020    Hyperammonemia (HCC)     Hyperkeratosis     Hypotension     Intellectual disability     Lennox-Gastaut syndrome with tonic seizures (HCC)     Lethargy     Liver enzyme elevation     Onychomycosis     Osteoporosis     Osteoporosis        Past Surgical History:   Procedure Laterality Date    ABDOMINAL SURGERY      CARDIAC PACEMAKER PLACEMENT      vns implant l chest    IR GASTROSTOMY TUBE PLACEMENT  2019    IR THORACENTESIS  2018    JEJUNOSTOMY FEEDING TUBE      history of - most recently, PEG tube    PEG TUBE PLACEMENT      CT IMP STIM,CRANIAL,SUBQ,1 ARRAY Left 2019    Procedure: REPLACEMENT IMPLANTABLE PULSE GENERATOR FOR VAGAL NERVE STIMULATOR, LEFT CHEST;  Surgeon: Maura Figueroa MD;  [facility]: [facility];  Service: Neurosurgery       History reviewed  No pertinent family history  I have reviewed and agree with the history as documented      E-Cigarette/Vaping    E-Cigarette Use Never User      E-Cigarette/Vaping Substances     Social History     Tobacco Use    Smoking status: Never Smoker    Smokeless tobacco: Never Used   Substance Use Topics    Alcohol use: Not Currently    Drug use: No       Review of Systems   Unable to perform ROS: Patient nonverbal   All other systems reviewed and are negative  Physical Exam  Physical Exam  Vitals signs and nursing note reviewed  Constitutional:       General: She is not in acute distress  Appearance: She is well-developed  Comments: Non verbal   Eyes:      General: No scleral icterus  Neck:      Musculoskeletal: Normal range of motion and neck supple  Cardiovascular:      Rate and Rhythm: Normal rate and regular rhythm  Heart sounds: Normal heart sounds  No murmur  No friction rub  No gallop  Pulmonary:      Effort: Pulmonary effort is normal  No respiratory distress  Breath sounds: Normal breath sounds  No wheezing or rales  Abdominal:      Palpations: Abdomen is soft  There is no mass  Tenderness: There is no abdominal tenderness  There is no guarding or rebound  Hernia: No hernia is present  Comments: The left upper quadrant, there is a tract from recently dislodged PEG tube   Musculoskeletal:      Comments: Contractures, bedbound   Skin:     General: Skin is warm and dry  Coloration: Skin is not pale     Neurological:      Comments: Nonverbal with developmental delay, contractures         Vital Signs  ED Triage Vitals [02/27/21 1456]   Temperature Pulse Respirations Blood Pressure SpO2   98 6 °F (37 °C) 70 16 111/79 100 %      Temp Source Heart Rate Source Patient Position - Orthostatic VS BP Location FiO2 (%)   Temporal Monitor Lying Right arm --      Pain Score       --           Vitals:    02/27/21 1456   BP: 111/79   Pulse: 70   Patient Position - Orthostatic VS: Lying         Visual Acuity      ED Medications  Medications   diatrizoate meglumine-sodium (GASTROGRAFIN) solution 20 mL (20 mL Per G Tube Given 2/27/21 1536)       Diagnostic Studies  Results Reviewed     None                 XR abdomen 1 view kub    (Results Pending)              Procedures  Feeding Tube    Date/Time: 2/27/2021 3:38 PM  Performed by: Joseph Thrasher DO  Authorized by: Joseph Thrasher DO   Universal Protocol:  Time out: Immediately prior to procedure a "time out" was called to verify the correct patient, procedure, equipment, support staff and site/side marked as required  Patient location:  ED  Pre-procedure details: Old tube type:  Gastrostomy    Old tube size:  14 Fr  Indications:     Indications: tube dislodged and tube removed by patient    Anesthesia (see MAR for exact dosages): Anesthesia method:  None  Procedure details:     Patient position:  Supine    Procedure type:  Replacement    Tube type:  Gastrostomy    Tube size:  14 Fr    Bulb inflation volume:  5 ml    Bulb inflation fluid:  Sterile water  Post-procedure details:     Placement/position confirmation:  X-ray    Placement difficulty:  None    Bleeding:  None    Patient tolerance of procedure: Tolerated well, no immediate complications             ED Course                                           MDM    Disposition  Final diagnoses:   PEG tube malfunction (HCC) - PEG tube replacement     Time reflects when diagnosis was documented in both MDM as applicable and the Disposition within this note     Time User Action Codes Description Comment    2/27/2021  3:32 PM Leigha [place] T Add [K94 23] PEG tube malfunction ([address] )     2/27/2021  3:32 PM Leigha [place] T Remove [K94 23] PEG tube malfunction ([address] )     2/27/2021  3:32 PM [place] T Add [K94 23] PEG tube malfunction ([address] )     2/27/2021  3:32 PM Lanice Gave Modify [L75 94] PEG tube malfunction ([facility] [address] [facility] ) PEG tube replacement      ED Disposition     ED Disposition Condition Date/Time Comment    Discharge Stable Sat Feb 27, 2021  3:32 PM Alejandro Wheeler [facility] discharge to home/self care              Follow-up Information     Follow up With Specialties Details Why [address] [facility] [address]  [phone]            Discharge Medication List as of 2/27/2021  3:32 PM      CONTINUE these medications which have NOT CHANGED Details   acetaminophen (TYLENOL) 325 mg tablet Take 650 mg by mouth every 6 (six) hours as needed for mild pain or fever, Historical Med      bisacodyl (BISCOLAX) 10 mg suppository Insert 10 mg into the rectum daily at bedtime as needed for constipation (if no BM day #4), Historical Med      bisacodyl (FLEET BISACODYL) 10 MG/30ML ENEM Insert 10 mg into the rectum as needed for constipation Give at hs on day 5 if suppository is not effective, Historical Med      Brivaracetam (Briviact) 10 MG/ML SOLN oral solution 5 mL (50 mg total) by Per PEG Tube route every 12 (twelve) hours, Starting Mon 2/22/2021, Normal      cannabidiol (Epidiolex) 100 mg/ml SOLN 4 5 mL (450 mg total) by Per G Tube route 2 (two) times a day, Starting Mon 2/22/2021, Normal      cloBAZam (ONFI) 10 MG tablet Give half a tab at 9AM and one tab at bedtime by G Tube, Normal      diazepam (VALIUM) 5 MG/ML solution Give 1mL by PEG tube PRN generalized tonic clonic seizure longer than 5 minutes or continuous multiple tonic seizures over 30 minutes, Normal      magnesium hydroxide (MILK OF MAGNESIA) 400 mg/5 mL oral suspension Take 30 mL by mouth daily as needed for constipation If not BM by day 3, Historical Med      !! melatonin 3 mg 6 mg by Per G Tube route daily at bedtime, Historical Med      !! MELATONIN PO 6 mg by Per G Tube route daily at bedtime, Historical Med      Multiple Vitamins-Minerals (MULTIVITAMIN WITH IRON-MINERALS) liquid 5 mL by Per G Tube route daily, Historical Med      Probiotic Product (ALEXANDER-BID PROBIOTIC PO) 1 tablet by Per G Tube route daily  , Historical Med      rufinamide (BANZEL) 400 mg tablet 4 tablets (1,600 mg total) by Per G Tube route every 12 (twelve) hours, Starting Mon 2/22/2021, Normal      topiramate (TOPAMAX) 50 MG tablet 5 tablets (250 mg total) by Per G Tube route every 12 (twelve) hours, Starting Mon 2/22/2021, Normal       !! - Potential duplicate medications found  Please discuss with provider          No discharge procedures on file      PDMP Review       Value Time User    PDMP Reviewed  Yes 2/22/2021  4:52 PM Shahida Ayala MD          ED Provider  Electronically Signed by           Kang Seth DO  02/27/21 1687

## 2021-03-01 ENCOUNTER — TELEPHONE (OUTPATIENT)
Dept: NEUROLOGY | Facility: CLINIC | Age: 41
End: 2021-03-01

## 2021-03-01 NOTE — TELEPHONE ENCOUNTER
Telephone call to [facility] and made patients appointment for 6/25/21 @ 9 am with Dr Ashely HUTCHINS faxed to [facility] [place] [phone]

## 2021-03-01 NOTE — TELEPHONE ENCOUNTER
----- Message from Huong Butcher MD sent at 2/22/2021  5:06 PM EST -----  Regarding: follow-up  Please call nursing home to schedule a 4 months follow-up  Release/forward AVS     Thanks      Damon Sam

## 2021-03-19 ENCOUNTER — PATIENT OUTREACH (OUTPATIENT)
Dept: CASE MANAGEMENT | Facility: OTHER | Age: 41
End: 2021-03-19

## 2021-03-19 NOTE — PROGRESS NOTES
I spoke with patient's caregiver at [facility] who states patient is at baseline

## 2021-04-05 ENCOUNTER — TELEPHONE (OUTPATIENT)
Dept: NEUROLOGY | Facility: CLINIC | Age: 41
End: 2021-04-05

## 2021-04-05 NOTE — TELEPHONE ENCOUNTER
[id number] 9am reschedule to [id number] 2pm Dr Eveline Gutierrez [place]  agree as provider out of office

## 2021-04-19 ENCOUNTER — TELEPHONE (OUTPATIENT)
Dept: NEUROLOGY | Facility: CLINIC | Age: 41
End: 2021-04-19

## 2021-04-19 ENCOUNTER — PATIENT OUTREACH (OUTPATIENT)
Dept: CASE MANAGEMENT | Facility: OTHER | Age: 41
End: 2021-04-19

## 2021-04-19 NOTE — PROGRESS NOTES
I called [place] where patient resides  I spoke with a caregiver who reports patient is doing well and at her baseline

## 2021-04-19 NOTE — TELEPHONE ENCOUNTER
Dr Taco RAYGOZA:    Facility calling to update on medication  Ran out of epidiolex medication over weekend  Patient missed 1 dose 4/18/2021 pm and 1 dose 4/19/2021 am  Medication has since arrived to facility  Patient had no seizures or events due to missed doses  Staff will continue to monitor  Marylen Estelle (nurse at facility)

## 2021-04-22 NOTE — TELEPHONE ENCOUNTER
No  At time of initial call (4/19/2021) epidiolex supply had been received  No return calls since of problems, seizures or events

## 2021-05-06 ENCOUNTER — EPISODE CHANGES (OUTPATIENT)
Dept: CASE MANAGEMENT | Facility: OTHER | Age: 41
End: 2021-05-06

## 2021-05-31 ENCOUNTER — APPOINTMENT (EMERGENCY)
Dept: RADIOLOGY | Facility: HOSPITAL | Age: 41
End: 2021-05-31
Payer: MEDICARE

## 2021-05-31 ENCOUNTER — HOSPITAL ENCOUNTER (EMERGENCY)
Facility: HOSPITAL | Age: 41
Discharge: NURSING, REHAB OR LONG-TERM CARE | End: 2021-05-31
Attending: EMERGENCY MEDICINE | Admitting: EMERGENCY MEDICINE
Payer: MEDICARE

## 2021-05-31 VITALS
RESPIRATION RATE: 18 BRPM | SYSTOLIC BLOOD PRESSURE: 99 MMHG | TEMPERATURE: 97.9 F | HEART RATE: 78 BPM | OXYGEN SATURATION: 96 % | DIASTOLIC BLOOD PRESSURE: 59 MMHG

## 2021-05-31 DIAGNOSIS — K94.23 PEG TUBE MALFUNCTION (HCC): Primary | ICD-10-CM

## 2021-05-31 PROCEDURE — 99284 EMERGENCY DEPT VISIT MOD MDM: CPT | Performed by: EMERGENCY MEDICINE

## 2021-05-31 PROCEDURE — 99283 EMERGENCY DEPT VISIT LOW MDM: CPT

## 2021-05-31 PROCEDURE — 74018 RADEX ABDOMEN 1 VIEW: CPT

## 2021-05-31 PROCEDURE — 43762 RPLC GTUBE NO REVJ TRC: CPT | Performed by: EMERGENCY MEDICINE

## 2021-05-31 NOTE — ED PROVIDER NOTES
History  Chief Complaint   Patient presents with    Feeding Tube Problem     pts comes from nursing home with a cracked PEG tube  Patient presents to the emergency department today for evaluation of PEG tube malfunction  This 44-year-old female very well-known to us  History of chronic gastric tube  She resides at a skilled nursing facility who states that the PEG tube was cracked  Sent her here for evaluation  Patient is chronically nonverbal however does not appear to be in acute distress  Prior to Admission Medications   Prescriptions Last Dose Informant Patient Reported? Taking?    Brivaracetam (Briviact) 10 MG/ML SOLN oral solution   No No   Si mL (50 mg total) by Per PEG Tube route every 12 (twelve) hours   MELATONIN PO  Outside Facility (Specify) Yes No   Si mg by Per G Tube route daily at bedtime   Multiple Vitamins-Minerals (MULTIVITAMIN WITH IRON-MINERALS) liquid  Outside Facility (Specify) Yes No   Si mL by Per G Tube route daily   Probiotic Product (ALEXANDER-BID PROBIOTIC PO)  Outside Facility (Specify) Yes No   Si tablet by Per G Tube route daily     acetaminophen (TYLENOL) 325 mg tablet   Yes No   Sig: Take 650 mg by mouth every 6 (six) hours as needed for mild pain or fever   bisacodyl (BISCOLAX) 10 mg suppository  Outside Facility (Specify) Yes No   Sig: Insert 10 mg into the rectum daily at bedtime as needed for constipation (if no BM day #4)   bisacodyl (FLEET BISACODYL) 10 MG/30ML ENEM   Yes No   Sig: Insert 10 mg into the rectum as needed for constipation Give at hs on day 5 if suppository is not effective   cannabidiol (Epidiolex) 100 mg/ml SOLN   No No   Si 5 mL (450 mg total) by Per G Tube route 2 (two) times a day   cloBAZam (ONFI) 10 MG tablet   No No   Sig: Give half a tab at 9AM and one tab at bedtime by G Tube   diazepam (VALIUM) 5 MG/ML solution   No No   Sig: Give 1mL by PEG tube PRN generalized tonic clonic seizure longer than 5 minutes or continuous multiple tonic seizures over 30 minutes   magnesium hydroxide (MILK OF MAGNESIA) 400 mg/5 mL oral suspension  Outside Facility (Specify) Yes No   Sig: Take 30 mL by mouth daily as needed for constipation If not BM by day 3   melatonin 3 mg   Yes No   Si mg by Per G Tube route daily at bedtime   rufinamide (BANZEL) 400 mg tablet   No No   Si tablets (1,600 mg total) by Per G Tube route every 12 (twelve) hours   topiramate (TOPAMAX) 50 MG tablet   No No   Si tablets (250 mg total) by Per G Tube route every 12 (twelve) hours      Facility-Administered Medications: None       Past Medical History:   Diagnosis Date    ADHD     [facility] [address] [facility]     Autistic disorder     Breakthrough seizure ([facility] [address] ) 2019    Dysphagia     Dysphagia, oropharyngeal phase     Gastrostomy tube dependent ([facility] [address] )     14 Fr as of 2020    Hyperammonemia (HCC)     Hyperkeratosis     Hypotension     Intellectual disability     Lennox-Gastaut syndrome with tonic seizures (HCC)     Lethargy     Liver enzyme elevation     Onychomycosis     Osteoporosis     Osteoporosis        Past Surgical History:   Procedure Laterality Date    ABDOMINAL SURGERY      CARDIAC PACEMAKER PLACEMENT      vns implant l chest    IR GASTROSTOMY TUBE PLACEMENT  2019    IR THORACENTESIS  2018    JEJUNOSTOMY FEEDING TUBE      history of - most recently, PEG tube    PEG TUBE PLACEMENT      MT IMP STIM,CRANIAL,SUBQ,1 ARRAY Left 2019    Procedure: REPLACEMENT IMPLANTABLE PULSE GENERATOR FOR VAGAL NERVE STIMULATOR, LEFT CHEST;  Surgeon: Rashad Clark MD;  [facility];  Service: Neurosurgery       History reviewed  No pertinent family history  I have reviewed and agree with the history as documented      E-Cigarette/Vaping    E-Cigarette Use Never User      E-Cigarette/Vaping Substances     Social History     Tobacco Use    Smoking status: Never Smoker    Smokeless tobacco: Never Used Substance Use Topics    Alcohol use: Not Currently    Drug use: No       Review of Systems   Constitutional: Negative for chills and fever  HENT: Negative for ear pain and sore throat  Eyes: Negative for pain and visual disturbance  Respiratory: Negative for cough and shortness of breath  Cardiovascular: Negative for chest pain and palpitations  Gastrointestinal: Negative for abdominal pain and vomiting  Genitourinary: Negative for dysuria and hematuria  Musculoskeletal: Negative for arthralgias and back pain  Skin: Negative for color change and rash  Neurological: Negative for seizures and syncope  All other systems reviewed and are negative  Physical Exam  Physical Exam  Vitals signs reviewed  Constitutional:       General: She is not in acute distress  Appearance: She is not ill-appearing, toxic-appearing or diaphoretic  Eyes:      Extraocular Movements: Extraocular movements intact  Cardiovascular:      Rate and Rhythm: Normal rate  Pulmonary:      Effort: Pulmonary effort is normal  No respiratory distress  Breath sounds: Normal breath sounds  No stridor  No wheezing, rhonchi or rales  Chest:      Chest wall: No tenderness  Abdominal:      General: Abdomen is flat  There is no distension  Palpations: There is no mass  Tenderness: There is no abdominal tenderness  There is no right CVA tenderness, left CVA tenderness, guarding or rebound  Hernia: No hernia is present  Comments: PEG tube noted   Musculoskeletal: Normal range of motion  Neurological:      General: No focal deficit present  Mental Status: She is alert     Psychiatric:         Mood and Affect: Mood normal          Vital Signs  ED Triage Vitals [05/31/21 1530]   Temperature Pulse Respirations Blood Pressure SpO2   97 9 °F (36 6 °C) 78 18 99/59 96 %      Temp Source Heart Rate Source Patient Position - Orthostatic VS BP Location FiO2 (%)   Temporal Monitor Lying Left arm -- Pain Score       --           Vitals:    05/31/21 1530   BP: 99/59   Pulse: 78   Patient Position - Orthostatic VS: Lying         Visual Acuity      ED Medications  Medications - No data to display    Diagnostic Studies  Results Reviewed     None                 XR abdomen 1 view kub    (Results Pending)              Procedures  Feeding Tube    Date/Time: 5/31/2021 3:31 PM  Performed by: Yamilet Jimenez PA-C  Authorized by: Yamilet Jimenez PA-C   Universal Protocol:  Risks and benefits: risks, benefits and alternatives were discussed  Patient identity confirmed: arm band      Patient location:  Bedside  Pre-procedure details: Old tube type:  Gastrostomy    Old tube size:  14 Fr  Indications:     Indications: tube malfunction    Anesthesia (see MAR for exact dosages): Anesthesia method:  None  Procedure details:     Patient position:  L lateral decubitus    Procedure type:  Replacement    Tube type:  Gastrostomy    Tube size:  14 Fr    Bulb inflation volume:  4    Bulb inflation fluid:  Normal saline  Post-procedure details:     Placement/position confirmation:  Contrast    Placement difficulty:  None    Bleeding:  Minimal    Patient tolerance of procedure: Tolerated well, no immediate complications             ED Course                                           MDM    Disposition  Final diagnoses:   PEG tube malfunction ([address] )     Time reflects when diagnosis was documented in both MDM as applicable and the Disposition within this note     Time User Action Codes Description Comment    5/31/2021  3:25 PM Luther Ocampo Add [A47 21] PEG tube malfunction [facility])       ED Disposition     ED Disposition Condition Date/Time Comment    Discharge Stable Mon May 31, 2021  3:25 PM James Cortez [facility] discharge to home/self care              Follow-up Information     Follow up With Specialties Details Why [facility], DO Family Medicine Schedule an appointment as soon as possible for a visit  As needed [address]  [phone]            Discharge Medication List as of 5/31/2021  3:26 PM      CONTINUE these medications which have NOT CHANGED    Details   acetaminophen (TYLENOL) 325 mg tablet Take 650 mg by mouth every 6 (six) hours as needed for mild pain or fever, Historical Med      bisacodyl (BISCOLAX) 10 mg suppository Insert 10 mg into the rectum daily at bedtime as needed for constipation (if no BM day #4), Historical Med      bisacodyl (FLEET BISACODYL) 10 MG/30ML ENEM Insert 10 mg into the rectum as needed for constipation Give at hs on day 5 if suppository is not effective, Historical Med      Brivaracetam (Briviact) 10 MG/ML SOLN oral solution 5 mL (50 mg total) by Per PEG Tube route every 12 (twelve) hours, Starting Mon 2/22/2021, Normal      cannabidiol (Epidiolex) 100 mg/ml SOLN 4 5 mL (450 mg total) by Per G Tube route 2 (two) times a day, Starting Mon 2/22/2021, Normal      cloBAZam (ONFI) 10 MG tablet Give half a tab at 9AM and one tab at bedtime by G Tube, Normal      diazepam (VALIUM) 5 MG/ML solution Give 1mL by PEG tube PRN generalized tonic clonic seizure longer than 5 minutes or continuous multiple tonic seizures over 30 minutes, Normal      magnesium hydroxide (MILK OF MAGNESIA) 400 mg/5 mL oral suspension Take 30 mL by mouth daily as needed for constipation If not BM by day 3, Historical Med      !! melatonin 3 mg 6 mg by Per G Tube route daily at bedtime, Historical Med      !! MELATONIN PO 6 mg by Per G Tube route daily at bedtime, Historical Med      Multiple Vitamins-Minerals (MULTIVITAMIN WITH IRON-MINERALS) liquid 5 mL by Per G Tube route daily, Historical Med      Probiotic Product (ALEXANDER-BID PROBIOTIC PO) 1 tablet by Per G Tube route daily  , Historical Med      rufinamide (BANZEL) 400 mg tablet 4 tablets (1,600 mg total) by Per G Tube route every 12 (twelve) hours, Starting Mon 2/22/2021, Normal      topiramate (TOPAMAX) 50 MG tablet 5 tablets (250 mg total) by Per G Tube route every 12 (twelve) hours, Starting Mon 2/22/2021, Normal       !! - Potential duplicate medications found  Please discuss with provider  No discharge procedures on file      PDMP Review       Value Time User    PDMP Reviewed  Yes 2/22/2021  4:52 PM Flora Heredia MD          ED Provider  Electronically Signed by           Rishabh Ochoa PA-C  05/31/21 1530

## 2021-06-02 ENCOUNTER — HOSPITAL ENCOUNTER (EMERGENCY)
Facility: HOSPITAL | Age: 41
Discharge: HOME | End: 2021-06-02
Attending: EMERGENCY MEDICINE | Admitting: EMERGENCY MEDICINE
Payer: MEDICARE

## 2021-06-02 ENCOUNTER — APPOINTMENT (EMERGENCY)
Dept: RADIOLOGY | Facility: HOSPITAL | Age: 41
End: 2021-06-02
Payer: MEDICARE

## 2021-06-02 VITALS
HEART RATE: 80 BPM | RESPIRATION RATE: 16 BRPM | DIASTOLIC BLOOD PRESSURE: 62 MMHG | SYSTOLIC BLOOD PRESSURE: 94 MMHG | WEIGHT: 93.03 LBS | HEIGHT: 63 IN | BODY MASS INDEX: 16.48 KG/M2 | OXYGEN SATURATION: 99 % | TEMPERATURE: 98.3 F

## 2021-06-02 DIAGNOSIS — K94.23 PEG TUBE MALFUNCTION (HCC): Primary | ICD-10-CM

## 2021-06-02 PROCEDURE — 74018 RADEX ABDOMEN 1 VIEW: CPT

## 2021-06-02 PROCEDURE — 99284 EMERGENCY DEPT VISIT MOD MDM: CPT | Performed by: EMERGENCY MEDICINE

## 2021-06-02 PROCEDURE — 99283 EMERGENCY DEPT VISIT LOW MDM: CPT

## 2021-06-02 RX ADMIN — IOHEXOL 50 ML: 240 INJECTION, SOLUTION INTRATHECAL; INTRAVASCULAR; INTRAVENOUS; ORAL at 12:12

## 2021-06-02 NOTE — ED PROVIDER NOTES
History  Chief Complaint   Patient presents with    Feeding Tube Problem     nursing home states that residual is "flying out" of PEG tube but will not flush; patient non verbal; PEG recently changed          42-year-old female, well-known to this facility, with past medical history as listed below who is presenting for evaluation of possible PEG tube dysfunction  We received a call from her nursing home indicating that the tube feed residual is "flying out of the PEG tube "  However, the nursing home later indicated that the PEG tube was not flushing  The patient was sent in for evaluation for this reason  The patient was seen on  regarding PEG tube malfunction  At that visit, the patient had a new 14 French PEG tube placed  X-ray performed with contrast demonstrated appropriate placement  The patient is nonverbal at baseline  She does not appear to be in any acute distress  No other issues reported by the nursing home  Prior to Admission Medications   Prescriptions Last Dose Informant Patient Reported? Taking?    Brivaracetam (Briviact) 10 MG/ML SOLN oral solution   No No   Si mL (50 mg total) by Per PEG Tube route every 12 (twelve) hours   MELATONIN PO  Outside Facility (Specify) Yes No   Si mg by Per G Tube route daily at bedtime   Multiple Vitamins-Minerals (MULTIVITAMIN WITH IRON-MINERALS) liquid  Outside Facility (Specify) Yes No   Si mL by Per G Tube route daily   Probiotic Product (ALEXANDER-BID PROBIOTIC PO)  Outside Facility (Specify) Yes No   Si tablet by Per G Tube route daily     acetaminophen (TYLENOL) 325 mg tablet   Yes No   Sig: Take 650 mg by mouth every 6 (six) hours as needed for mild pain or fever   bisacodyl (BISCOLAX) 10 mg suppository  Outside Facility (Specify) Yes No   Sig: Insert 10 mg into the rectum daily at bedtime as needed for constipation (if no BM day #4)   bisacodyl (FLEET BISACODYL) 10 MG/30ML ENEM   Yes No   Sig: Insert 10 mg into the rectum as needed for constipation Give at hs on day 5 if suppository is not effective   cannabidiol (Epidiolex) 100 mg/ml SOLN   No No   Si 5 mL (450 mg total) by Per G Tube route 2 (two) times a day   cloBAZam (ONFI) 10 MG tablet   No No   Sig: Give half a tab at 9AM and one tab at bedtime by G Tube   diazepam (VALIUM) 5 MG/ML solution   No No   Sig: Give 1mL by PEG tube PRN generalized tonic clonic seizure longer than 5 minutes or continuous multiple tonic seizures over 30 minutes   magnesium hydroxide (MILK OF MAGNESIA) 400 mg/5 mL oral suspension  Outside Facility (Specify) Yes No   Sig: Take 30 mL by mouth daily as needed for constipation If not BM by day 3   melatonin 3 mg   Yes No   Si mg by Per G Tube route daily at bedtime   rufinamide (BANZEL) 400 mg tablet   No No   Si tablets (1,600 mg total) by Per G Tube route every 12 (twelve) hours   topiramate (TOPAMAX) 50 MG tablet   No No   Si tablets (250 mg total) by Per G Tube route every 12 (twelve) hours      Facility-Administered Medications: None       Past Medical History:   Diagnosis Date    ADHD     Anoxic brain damage (HCC)     Autistic disorder     Breakthrough seizure ([facility] [address] ) 2019    Dysphagia     Dysphagia, oropharyngeal phase     Gastrostomy tube dependent ([facility] [address] )     14 Fr as of 2020    Hyperammonemia (HCC)     Hyperkeratosis     Hypotension     Intellectual disability     Lennox-Gastaut syndrome with tonic seizures (HCC)     Lethargy     Liver enzyme elevation     Onychomycosis     Osteoporosis     Osteoporosis        Past Surgical History:   Procedure Laterality Date    ABDOMINAL SURGERY      CARDIAC PACEMAKER PLACEMENT      vns implant l chest    IR GASTROSTOMY TUBE PLACEMENT  2019    IR THORACENTESIS  2018    JEJUNOSTOMY FEEDING TUBE      history of - most recently, PEG tube    PEG TUBE PLACEMENT      NV IMP STIM,CRANIAL,SUBQ,1 ARRAY Left 2019    Procedure: REPLACEMENT IMPLANTABLE PULSE GENERATOR FOR VAGAL NERVE STIMULATOR, LEFT CHEST;  Surgeon: Adis Miranda MD;  [facility];  Service: Neurosurgery       History reviewed  No pertinent family history  I have reviewed and agree with the history as documented  E-Cigarette/Vaping    E-Cigarette Use Never User      E-Cigarette/Vaping Substances     Social History     Tobacco Use    Smoking status: Never Smoker    Smokeless tobacco: Never Used   Substance Use Topics    Alcohol use: Not Currently    Drug use: No       Review of Systems   Unable to perform ROS: Patient nonverbal       Physical Exam  Physical Exam  Vitals signs and nursing note reviewed  Constitutional:       General: She is not in acute distress  Appearance: She is well-developed  Comments: Chronically ill-appearing, no acute distress  HENT:      Head: Normocephalic and atraumatic  Right Ear: External ear normal       Left Ear: External ear normal    Eyes:      Conjunctiva/sclera: Conjunctivae normal       Pupils: Pupils are equal, round, and reactive to light  Cardiovascular:      Rate and Rhythm: Normal rate and regular rhythm  Heart sounds: Normal heart sounds  No murmur  Pulmonary:      Effort: Pulmonary effort is normal  No respiratory distress  Breath sounds: Normal breath sounds  No wheezing or rales  Abdominal:      General: Bowel sounds are normal  There is no distension  Palpations: Abdomen is soft  Tenderness: There is no abdominal tenderness  There is no guarding  Comments: PEG tube noted  Abdomen soft, nontender  Musculoskeletal: Normal range of motion  General: No deformity  Skin:     General: Skin is warm and dry  Neurological:      Mental Status: She is alert  Comments: Patient is awake  She is nonverbal at baseline     Psychiatric:      Comments: Unable to assess, nonverbal          Vital Signs  ED Triage Vitals [06/02/21 1147]   Temperature Pulse Respirations Blood Pressure SpO2   98 3 °F (36 8 °C) 85 16 96/52 99 %      Temp Source Heart Rate Source Patient Position - Orthostatic VS BP Location FiO2 (%)   Temporal Monitor Lying Right arm --      Pain Score       --           Vitals:    06/02/21 1147 06/02/21 1300   BP: 96/52 94/62   Pulse: 85 80   Patient Position - Orthostatic VS: Lying Lying         Visual Acuity      ED Medications  Medications   iohexol (OMNIPAQUE) 240 MG/ML solution 50 mL (50 mL Intravenous Given 6/2/21 1212)       Diagnostic Studies  Results Reviewed     None                 XR abdomen 1 view portable   ED Interpretation by Jaguar Dolan MD (06/02 1215)   X-ray interpreted by me  Formal Radiology interpretation to follow  Patient motion examination suboptimal   However, the contrast is clearly located within the stomach, indicating that the PEG tube is appropriately placed  Procedures  Procedures         ED Course                                           MDM  Number of Diagnoses or Management Options  PEG tube malfunction [facility]): established and worsening  Diagnosis management comments:     Patient presented with reported PEG tube malfunction  The nursing home initially stated that tube feed residuals were "flying out" of the tube  However, the nursing home later indicated that the tube was not flushing  On evaluation, the patient had normal vital signs and did not appear to be in any acute distress  Abdomen was benign  I injected contrast through the PEG tube without difficulty  There was no apparent obstruction  There was no leakage of the contrast   X-ray was performed and on my reading showed contrast clearly within the stomach lumen  There was some patient motion artifact  Therefore, the PEG tube was in appropriate position and was functioning appropriately  The patient was discharged back to her nursing home         Amount and/or Complexity of Data Reviewed  Tests in the radiology section of CPT®: ordered and reviewed  Decide to obtain previous medical records or to obtain history from someone other than the patient: yes  Obtain history from someone other than the patient: yes  Review and summarize past medical records: yes  Independent visualization of images, tracings, or specimens: yes    Risk of Complications, Morbidity, and/or Mortality  Presenting problems: low  Diagnostic procedures: minimal  Management options: minimal    Patient Progress  Patient progress: improved      Disposition  Final diagnoses:   PEG tube malfunction ([address] )     Time reflects when diagnosis was documented in both MDM as applicable and the Disposition within this note     Time User Action Codes Description Comment    6/2/2021 12:19 PM Bridgette Dust Add [S92 24] PEG tube malfunction [facility])       ED Disposition     ED Disposition Condition Date/Time Comment    Discharge Good Wed Jun 2, 2021 12:19 PM [facility] discharge to home/self care  Follow-up Information     Follow up With Specialties Details Why Contact Info Jimbo Solorzano DO Family Medicine Call  As needed  [address]  [facility]  Emergency Department Emergency Medicine Go to  If symptoms worsen   [id number]  [facility] Emergency Department[address], [facility]          Discharge Medication List as of 6/2/2021 12:21 PM      CONTINUE these medications which have NOT CHANGED    Details   acetaminophen (TYLENOL) 325 mg tablet Take 650 mg by mouth every 6 (six) hours as needed for mild pain or fever, Historical Med      bisacodyl (BISCOLAX) 10 mg suppository Insert 10 mg into the rectum daily at bedtime as needed for constipation (if no BM day #4), Historical Med      bisacodyl (FLEET BISACODYL) 10 MG/30ML ENEM Insert 10 mg into the rectum as needed for constipation Give at hs on day 5 if suppository is not effective, Historical Med      Brivaracetam (Briviact) 10 MG/ML SOLN oral solution 5 mL (50 mg total) by Per PEG Tube route every 12 (twelve) hours, Starting Mon 2/22/2021, Normal      cannabidiol (Epidiolex) 100 mg/ml SOLN 4 5 mL (450 mg total) by Per G Tube route 2 (two) times a day, Starting Mon 2/22/2021, Normal      cloBAZam (ONFI) 10 MG tablet Give half a tab at 9AM and one tab at bedtime by G Tube, Normal      diazepam (VALIUM) 5 MG/ML solution Give 1mL by PEG tube PRN generalized tonic clonic seizure longer than 5 minutes or continuous multiple tonic seizures over 30 minutes, Normal      magnesium hydroxide (MILK OF MAGNESIA) 400 mg/5 mL oral suspension Take 30 mL by mouth daily as needed for constipation If not BM by day 3, Historical Med      !! melatonin 3 mg 6 mg by Per G Tube route daily at bedtime, Historical Med      !! MELATONIN PO 6 mg by Per G Tube route daily at bedtime, Historical Med      Multiple Vitamins-Minerals (MULTIVITAMIN WITH IRON-MINERALS) liquid 5 mL by Per G Tube route daily, Historical Med      Probiotic Product (ALEXANDER-BID PROBIOTIC PO) 1 tablet by Per G Tube route daily  , Historical Med      rufinamide (BANZEL) 400 mg tablet 4 tablets (1,600 mg total) by Per G Tube route every 12 (twelve) hours, Starting Mon 2/22/2021, Normal      topiramate (TOPAMAX) 50 MG tablet 5 tablets (250 mg total) by Per G Tube route every 12 (twelve) hours, Starting Mon 2/22/2021, Normal       !! - Potential duplicate medications found  Please discuss with provider  No discharge procedures on file      PDMP Review       Value Time User    PDMP Reviewed  Yes 2/22/2021  4:52 PM Alexandria Lopez MD          ED Provider  Electronically Signed by           Catarino Woodson MD  06/02/21 5782

## 2021-06-02 NOTE — DISCHARGE INSTRUCTIONS
The PEG tube is in appropriate position  This was confirmed with an x-ray with contrast   There was no difficulty injecting the contrast into the PEG tube    No leakage of the contrast

## 2021-06-07 ENCOUNTER — TELEPHONE (OUTPATIENT)
Dept: NEUROLOGY | Facility: CLINIC | Age: 41
End: 2021-06-07

## 2021-06-07 NOTE — TELEPHONE ENCOUNTER
Spoke with Gudelia Bustos then [facility] to try and get patient into the office sooner with Dr Elenita Marks for VNS interrogation - [facility]  UPDATE - patient cannot get transport in time   Will keep on wait list

## 2021-06-09 ENCOUNTER — TELEPHONE (OUTPATIENT)
Dept: NEUROLOGY | Facility: CLINIC | Age: 41
End: 2021-06-09

## 2021-06-09 NOTE — TELEPHONE ENCOUNTER
ADD ON - patient is scheduled with Dr Mark Cat on 6/11 @ 2:30 in [facility] - insurance is still in force

## 2021-06-09 NOTE — TELEPHONE ENCOUNTER
Spoke with Namrata Queen to get patient scheduled in for sooner appt with Dr Ellie Verduzco is in [place] on 6/11 @ 2:30 - they are checking on transport and will call me back

## 2021-07-06 ENCOUNTER — TELEPHONE (OUTPATIENT)
Dept: NEUROLOGY | Facility: CLINIC | Age: 41
End: 2021-07-06

## 2021-07-08 ENCOUNTER — OFFICE VISIT (OUTPATIENT)
Dept: NEUROLOGY | Facility: CLINIC | Age: 41
End: 2021-07-08
Payer: MEDICARE

## 2021-07-08 ENCOUNTER — TELEPHONE (OUTPATIENT)
Dept: NEUROLOGY | Facility: CLINIC | Age: 41
End: 2021-07-08

## 2021-07-08 VITALS — RESPIRATION RATE: 18 BRPM | DIASTOLIC BLOOD PRESSURE: 62 MMHG | SYSTOLIC BLOOD PRESSURE: 106 MMHG | HEART RATE: 73 BPM

## 2021-07-08 DIAGNOSIS — T42.75XA ANTICONVULSANT DRUG-INDUCED OSTEOMALACIA: ICD-10-CM

## 2021-07-08 DIAGNOSIS — G40.813 LENNOX-GASTAUT SYNDROME, INTRACTABLE, WITH STATUS EPILEPTICUS (HCC): ICD-10-CM

## 2021-07-08 DIAGNOSIS — G40.812 LENNOX-GASTAUT SYNDROME, NOT INTRACTABLE, WITHOUT STATUS EPILEPTICUS (HCC): ICD-10-CM

## 2021-07-08 DIAGNOSIS — M83.5 ANTICONVULSANT DRUG-INDUCED OSTEOMALACIA: ICD-10-CM

## 2021-07-08 DIAGNOSIS — G40.812 LENNOX-GASTAUT SYNDROME WITH TONIC SEIZURES (HCC): Primary | ICD-10-CM

## 2021-07-08 DIAGNOSIS — G40.813 INTRACTABLE LENNOX-GASTAUT SYNDROME WITH STATUS EPILEPTICUS (HCC): Primary | ICD-10-CM

## 2021-07-08 PROCEDURE — 99214 OFFICE O/P EST MOD 30 MIN: CPT | Performed by: PSYCHIATRY & NEUROLOGY

## 2021-07-08 PROCEDURE — 95976 ALYS SMPL CN NPGT PRGRMG: CPT | Performed by: PSYCHIATRY & NEUROLOGY

## 2021-07-08 RX ORDER — BRIVARACETAM 10 MG/ML
50 SOLUTION ORAL EVERY 12 HOURS
Qty: 300 ML | Refills: 5 | Status: SHIPPED | OUTPATIENT
Start: 2021-07-08 | End: 2022-03-03 | Stop reason: SDUPTHER

## 2021-07-08 RX ORDER — RUFINAMIDE 400 MG/1
1600 TABLET, FILM COATED ORAL EVERY 12 HOURS
Qty: 240 TABLET | Refills: 5 | Status: SHIPPED | OUTPATIENT
Start: 2021-07-08 | End: 2022-03-03 | Stop reason: SDUPTHER

## 2021-07-08 RX ORDER — TOPIRAMATE 50 MG/1
250 TABLET, FILM COATED ORAL EVERY 12 HOURS SCHEDULED
Qty: 300 TABLET | Refills: 5 | Status: SHIPPED | OUTPATIENT
Start: 2021-07-08 | End: 2022-03-03 | Stop reason: SDUPTHER

## 2021-07-08 RX ORDER — CLOBAZAM 10 MG/1
TABLET ORAL
Qty: 45 TABLET | Refills: 5 | Status: SHIPPED | OUTPATIENT
Start: 2021-07-08 | End: 2022-03-03

## 2021-07-08 RX ORDER — LACOSAMIDE 10 MG/ML
SOLUTION ORAL
Qty: 300 ML | Refills: 5 | Status: SHIPPED | OUTPATIENT
Start: 2021-07-08 | End: 2021-07-08

## 2021-07-08 RX ORDER — LACOSAMIDE 10 MG/ML
SOLUTION ORAL
Qty: 300 ML | Refills: 5 | Status: SHIPPED | OUTPATIENT
Start: 2021-07-08 | End: 2022-03-03

## 2021-07-08 RX ORDER — CLOBAZAM 10 MG/1
TABLET ORAL
Qty: 45 TABLET | Refills: 5 | Status: SHIPPED | OUTPATIENT
Start: 2021-07-08 | End: 2021-07-08 | Stop reason: SDUPTHER

## 2021-07-08 RX ORDER — DIAZEPAM 5 MG/100UL
SPRAY NASAL
Qty: 2 EACH | Refills: 5 | Status: SHIPPED | OUTPATIENT
Start: 2021-07-08 | End: 2021-07-12

## 2021-07-08 NOTE — PATIENT INSTRUCTIONS
Plan:   Medications are given by PEG  1 - Continue Epidiolex 100mg/mL 4 5 mL (450mg) every 12 hours (Goes to [facility] specialty pharmacy)  2 - Continue Briviact 10mg/mL 5mL (50mg) every 12 hours  3 - continue with Topiramate 250mg every 12 hours  4 - continue Banzel 400mg give 4 tabs every 12 hours  5 - Discontinue the Clobazam (Onfi) 5mg dose in the morning; change total dose to bedtime  Give Clobazam 10mg tab one and a half tab at bedtime  6 - Start Vimpat 10mg/mL give 5mL every 12 hours  7 - discontinue the diazepam oral solution for breakthrough seizures  Give nasal diazepam (Valtoco) one 5mg nasal spray to one nostril for seizure lasting more than 3 minutes, may give a second nasal spray to other nostril if seizure continues to last more than 10 minutes  If the patient has a seizure for more than 1 minute, use VNS magnet swipe over the VNS battery/generator on her upper left chest, may repeat in 3 minutes up to 5 times until seizure stops    8 - check blood work in 2 weeks:  Clobazam level, topiramate level, ammonia level, CMP, and vitamin D level  9 - if patient continues to be excessively sedated in 1 month call the office, will need to decrease the dose of Clobazam, which is likely causing excessive sedation, will consider increasing dose of Vimpat  10 - follow-up in 3 months

## 2021-07-08 NOTE — TELEPHONE ENCOUNTER
They can go back to using the oral diazepam solution then  It just seems that the oral diazepam solution was not being used and they get 30mL from the pharmacy that goes to waste  After today's patients, I'll readjust the diazepam script back to oral diazepam solution

## 2021-07-08 NOTE — PROGRESS NOTES
[facility]  Patient's Name: Becky Virk   Patient's : 1980   Visit Type: follow-up  Referring MD / PCP:  Nish Zhong DO     Assessment:  Ms Vaibhav Early is a 37 y o  woman with intractable Lennox Gastaut Syndrome, progressive encephalopathy, continuous EEG monitoring frequent finds innumerable number of tonic seizures despite multiple medication trials and VNS therapy  There is a remote history of anoxic brain injury (however her MRI brain study is normal)  Her EEGs has features of both generalized and focal epilepsy; her focal seizures seem to occur from the bilateral temporal regions with rhythmic discharges, but no apparent clinical symptoms  During this office visit, she had her typical generalized tonic seizure  She likely has an unchanged frequency of seizures, as her tonic seizures are often too difficult to detect  She is not on continuous observation as she has been extremely sedated and incapacitated due to epileptic encephalopathy  Given today's seizure in the office, I'll recommend starting Vimpat  She has maximized the dose of topiramate  She is sensitive to GABAergic types of medications, she was excessively sedated with phenobarbital   Will try moving all of her clobazam dose to the even  Will need to check clobazam levels as cannibidiol can cause elevation in clobazam metabolites that causes sedation  Plan:   Medications are given by PEG  1 - Continue Epidiolex 100mg/mL 4 5 mL (450mg) every 12 hours (Goes to [facility] specialty pharmacy)  2 - Continue Briviact 10mg/mL 5mL (50mg) every 12 hours  3 - continue with Topiramate 250mg every 12 hours  4 - continue Banzel 400mg give 4 tabs every 12 hours  5 - Discontinue the Clobazam (Onfi) 5mg dose in the morning; change total dose to bedtime  Give Clobazam 10mg tab one and a half tab at bedtime  6 - Start Vimpat 10mg/mL give 5mL every 12 hours    7 - discontinue the diazepam oral solution for breakthrough seizures  Give nasal diazepam (Valtoco) one 5mg nasal spray to one nostril for seizure lasting more than 3 minutes, may give a second nasal spray to other nostril if seizure continues to last more than 10 minutes  If the patient has a seizure for more than 1 minute, use VNS magnet swipe over the VNS battery/generator on her upper left chest, may repeat in 3 minutes up to 5 times until seizure stops    8 - check blood work in 2 weeks:  Clobazam level, topiramate level, ammonia level, CMP, and vitamin D level  9 - if patient continues to be excessively sedated in 1 month call the office, will need to decrease the dose of Clobazam, which is likely causing excessive sedation, will consider increasing dose of Vimpat  10 - follow-up in 3 months      Problem List Items Addressed This Visit     None      Visit Diagnoses     Lennox-Gastaut syndrome with tonic seizures ([facility] [address] )    -  Primary    Relevant Medications    cannabidiol (Epidiolex) 100 mg/ml SOLN    topiramate (TOPAMAX) 50 MG tablet    rufinamide (BANZEL) 400 mg tablet    cloBAZam (ONFI) 10 MG tablet    Brivaracetam (Briviact) 10 MG/ML SOLN oral solution    diazePAM (Valtoco 5 MG Dose) 5 MG/0 1ML LIQD    lacosamide (Vimpat) 10 mg/mL    Lennox-Gastaut syndrome, intractable, with status epilepticus (HCC)        Relevant Medications    cannabidiol (Epidiolex) 100 mg/ml SOLN    topiramate (TOPAMAX) 50 MG tablet    rufinamide (BANZEL) 400 mg tablet    cloBAZam (ONFI) 10 MG tablet    Brivaracetam (Briviact) 10 MG/ML SOLN oral solution    diazePAM (Valtoco 5 MG Dose) 5 MG/0 1ML LIQD    lacosamide (Vimpat) 10 mg/mL    Other Relevant Orders    Clobazam    Ammonia    Topiramate level    Comprehensive metabolic panel    Lennox-Gastaut syndrome, not intractable, without status epilepticus (HCC)        Relevant Medications    cannabidiol (Epidiolex) 100 mg/ml SOLN    topiramate (TOPAMAX) 50 MG tablet    rufinamide (BANZEL) 400 mg tablet    cloBAZam (ONFI) 10 MG tablet Brivaracetam (Briviact) 10 MG/ML SOLN oral solution    diazePAM (Valtoco 5 MG Dose) 5 MG/0 1ML LIQD    lacosamide (Vimpat) 10 mg/mL    Anticonvulsant drug-induced osteomalacia        Relevant Orders    Vitamin D 25 hydroxy        Chief Complaint:    Chief Complaint     Seizures        HPI:    Alberto Squires is a 37 y  o female here for follow-up evaluation of intractable epilepsy in the setting of LGS  Interval History 7/8/2021  Brian Zamora has been doing okay  She has not had the seizures she had in the past   In the past there were seizures that lasted about about a minute (tonic seizures)  These seizures are very difficult to notice, unless someone is with her  The last grand mal seizure was back in February  Her sleep is all over the place  She can be asleep for about half a day to a whole day  But she can be wide awake  It seems that she has been sleepier more than usual   No diarrhea, no kidney stones, no kidney disease or liver disease  AED/side effects/compliance:  Banzel 400mg give 4 tabs twice a day   Topiramate 250mg tab twice a day   Epidiolex 450mg twice a day  Briviact 10mg/mL 50mL twice a day  Onfi 5-10    Event/Seizure semiology:  Seizure semiology:  1  She was described to have drop attacks or spells with grunting sounds and falling to the floor  2  Her nurse commented on episodes of spasms or myoclonic jerking of the right arm  3  Generalized convulsions  4  Tonic seizures of eyes rolling back (mostly during sleep)    Prior Epilepsy History:  Collective epilepsy history 11/6/2014 was previously evaluated by Dr Namita Mead including a hospitalization in February 2013 for status epilepticus, in the setting of missed doses of AEDs due to refusal to eat  She subsequently required anesthetic induced coma to stop her seizures and PEG tube was placed  She was seen almost every month for management of her seizures up until November 2013   She has medically refractory seizures and had a VNS placed possibly in the early 2000s and a generator changed in 2011  Unknown AEDs that were tried but felbamate is listed as an allergy  In 2011, her AEDs were clonazepam, levetiracetam, Depakote and Lamictal  Dr Dayanara Albarran had started [facility] in 2011  In 2012, Karo Yost was added with the reduction of clonazepam  There were difficulty with documenting seizure frequency; but seizures were no longer daily occurrences but there were clusters of seizures  There would be good periods and bad periods of seizure frequency  Dr Dayanara Albarran had been increasing the duty cycle of the VNS over the course of 2012 to 2013  Sometime between August 2013 and October 2013, topiramate was introduced  She was in status epilepticus in 2013, she was on Keppra, Banzel, Onfi, and Lamictal  She had an EEG at some point that showed multifocal spike-wave and background attenuation  She has intermittent agitation and day time somnolence  When she was hospitalized for status epilepticus, she was started on methylphenidate to help wake her up  Intake history November 2014:  VPA level 127  Her Valproic Acid dose was previously 250mg q6hrs based on Dr Tae Kennedy notes  Since July 2014, she has been receiving VPA 500mg q6hrs  She has not been hospitalized since September 2013  She was previously ambulatory with therapy  She spends most of her time sleeping for the past couple of months  She has also been receiving lactulose for elevated ammonia levels  (older drugs VPA, LVT, LMG, newer drugs added on since 2011 RFN, Onfi, TPM)     Summary of 2015: We decreased VPA from TDD 2000mg to 1000mg with increased agitation/combativeness, alternating days of exhaustion (no behavioral specialist has been involved)  are randomly reported by multiple staff members  Seizures are typically reported by CNA's or her one to one on the 3PM to 11PM shift   Seizures are described a brief events of eyes rolling back and arms going up in the air, followed by hair pulling or slapping herself  These seizures were reported as either sporadic or every other day episodes  There have been no documented grand mal seizures or drop attacks  She refuses to wear safety helmet, rips at her hair, and attempts to flip herself out of her chair and bed  Bevely Chute can walk briefly but walks on her toes, she frequently will allow herself to fall down when she does not want to walk  It does not appear that methylphenidate has been useful in maintaining her level of wakefulness during the day  VNS generator change on 11/3/2015  The Model 103 (serial [id number]) was replaced with Glassful Model 105, serial [id number]  Weaned off of levetiracetam due to multiple medications and agitation; by the end of 2016, she was down to -500  Summary of 2016:  Started with RFN [id number], -250, CLB 0 5-0 5-25,  QID, -200 attempted to wean off of LEV and reduced the dose of VPA given that there were no reports of seizures and she was sedated, along with elevated ammonia levels  It was unclear as to how effective LEV was for her seizure control  When she missed a dose of TPM in September 2016, she had multiple seizures  We attempted to compensate for increase in seizures by increasing Onfi to 20-20; however, it seems that it made her more sedated  Summary of 2017  RFN [id number], -250, Onfi 20-20, -200,  q8hrs  She has been more somnolent  She continues to have tonic seizures when she is asleep, eyes rolling body, arms will tense up with some twitching, last a few seconds, but will recur  There are no seizures during the daytime  We attempted to wean off of VPA due to ammonia / somnolence; but there was an increase in tonic seizures (tonic stiffness, eyes rolling upwards, and subtle arm (right?) jerking)       Hospital admission 10/10-10/26/2017, due to seizures in setting of infection, was put on continuous video EEG monitoring to determine her seizure type, seizure frequency, and rapid medication adjustments  Rapid med changes: d/c'ed lamotrigine, restarted levetiracetam, attempted discontinuation of valproic acid (more seizures, so restarted), attempted reduction in Onfi, and starting Fycompa  The patient's seizures were mostly based during sleep, tonic seizures  Summary of 2018  Started with RFN [id number], PER 8, LEV 7302-1870, CLB 5-15, -250,  TID  Due to excessive somnolence Onfi was weaned off  She was on continuous EEG monitoring when she was in hospital for PNA that showed very frequent tonic seizures during sleep  VPA is causing hyperammoniemia  She has had more admissions for somnolence / lethargy, VPA was reduced for transaminitis  She was tested for inborn error of metabolism with plasma amino acid, urine organic acid, and acylcarnitine levels  There were nonspecific elevations in some amino acid or organic acid but no pattern consistent with a specific inborn error of metabolism  Elevated carnitine level was consistent with supplementation  Higher doses of phenobarbital may also contribute to sedation, phenobarbital was reduced to 20mg but on follow-up she was on 20mL of oral solution (80mg / day)  There is variable reporting in the frequency of tonic seizures (these are the eyes are rolling back and shaking, then stop, then happen again in 10-15 minutes)  Her level of alertness is also variable with erratic sleep cycle  Summary 2019  We started the year with  TID, RFN [id number], -200, LEV 0514-1776, PER 10, PB 40 qHS  In December 2018, Hospital admission for septic shock and aspiration PNA  She had an EEG that captured recurrent tonic seizures during sleep but this is consistently found in all of her other EEG studies  Another hospital admission January 2019 for recurrent convulsive seizures  Due to sedation Phenobarbital was weaned off and Fycompa was increased to 10mg at bedtime  TPM increased to 250-250  Tried to wean off of VPA due to ammonia levels  We started Epidiolex in the Spring 2019, which seems to have improved seizure control  January 2019 - Her CNA reports that Marino Gee is no longer Marino Gee, she is essentially bed ridden  She does not eat and does not walk  Marino Gee usually participate in activities, walking, and eating food (if she was at a [facility] she would be able to eat a hamburger without difficulty)  I was able to speak to her father Fannie Fishman and he reported that he too saw a significant decline in mental status  There was a phone call from   Marianne Lizeth  reporting an increase in seizure activity (brief episodes tonic-myoclonic jerking); but subsequent reports did not notice an increase in activity  Weaned off of phenobarbital, suspecting that it was causing elevated transaminases  There is variable periods of wakefulness and alertness; seizures are reported sporadically, sometimes she seems to have clusters of seizures (during an office visit there were about 8 tonic seizures lasting 15-20 seconds)  We weaned her off of valproic acid and increased Epidiolex  June 2019, hospitalized for status epilepticus (multiple clinical seizures, every 5-6 minutes, associated with apnea)  Continuous EEG monitoring showed very frequent 15-30 seconds tonic seizures , whether this is different from her baseline is difficult to determine  Since she was on continuous EEG monitoring a number of medication trials were given  At one point it seems that lorazepam and more levetiracetam had improved seizures  Her tube feed was adjust to more protein and lower carbohydrates  2019 - multiple phone calls regarding recurrent seizures (generalized shaking, tonic body rigidity and clonic activity)    Summary 2020  RUF [id number], -250, LEV 1000 q8hr, -400, JESSICA 50-50, CLB 5 qHS  Replacement of her VNS in December 2019, [facility] from [address] to [facility]  Seizure reporting had been sporadic  Despite medication adjustments, there has been no improvement in degree of wakefulness, nonverbal, calls out at times  She does not do much  Transitioned LEV to Brivaracetam   Increased clobazam to 10mg at bedtime on 2020  Unclear if there has been improvement in seizure frequency; but there were no admissions to hospital for seizures  Interval History 2021  RUF [id number], -250, -400, JESSICA 75-75, CLB 5-10  She was admitted to Hospital on 2021 for multiple seizures in one day  Iredell Krill was increased with an extra 5mg tab in AM   With the increase in Iredell Krill, there has been no report of excessive sedation  She appears to be acting her normal self  She continues to have stretches of non-24 hour sleep cycle  She does always appear to be active, smiling and laughter  She was COVID19 positive back in 2021, but she was not symptomatic  There was a couple of days when she did not have Epidiolex due to the weather, but there was no worsening of her seizures  There have been no report of diarrhea  Other seizures including with her arms raised up and stay up  There is mild improvement with current medications, less frequent reports of seizures  Special Features  Status epilepticus: yes  Self Injury Seizures: No  Precipitating Factors: menstrual cycle? ?     Epilepsy Risk Factors:  Abnormal pregnancy: unknown  Abnormal birth/: unknown  Abnormal Development: unknown developmental history  Febrile seizures, simple: unknown  Febrile seizures, complex: unknown  CNS infection: No  Mental retardation: Yes  Cerebral palsy: Yes  Head injury (moderate/severe): possibly anoxic brain injury  CNS neoplasm: No  CNS malformation: No  Neurosurgical procedure: No  Stroke: No  Alcohol abuse: No  Drug abuse: No  Family history Sz/epilepsy: unknown    Prior AEDs:  Rufinamide  Clobazam (excessive sedation)  Clonazepam  Levetiracetam (unclear if beneficial)  Lamotrigine (unclear if beneficial)  Topiramate (missed doses caused breakthrough convulsive type of seizures)  Valproate (elevated ammonia levels)  Fycompa (excess sedation)  Felbamate (listed as a drug allergy)  Phenobarbital (contributing to sedation)  Epidiolex (seems to help the most)  Brivaracetam    Prior workup:  x   Imaging:  CT head 2/21/2013, 9/7/2018  No acute intracranial pathology    3/20/2019  MRI brain w/wo contrast  Normal MR brain study  Symmetric hippocampal formations    EEGs:  Continuous video EEG monitoring 10/13 - 10/15/2017  Frequent generalized spike/polyspike-slow wave complexes during sleep  At times there are independent right more than left midtemporal spikes and bitemporal spikes    Innumerable generalized tonic seizures during sleep, generalized 1 Hz period discharges, that would become continuous for 30 seconds or generalized rhythmic alpha-beta discharges, associated with patient becoming rigid, tonic posturing with arms elevated and tense with subtle jerking of the upper body, eyes opening with upward deviation  Ictal patterns:  1 - Seconds of diffuse electrodecrement then paroxysmal fast activity followed by 2Hz spike wave discharges (clinically accompanied by posturing of the arms, then clonic jerking)    Continuous video EEG monitoring 10/18 - 10/25/2017  Diffuse background slowing with bursts of arrhythmic generalized spike wave discharges, with shifting lateralization, and independent left and  right temporal spikes  Mild eyelid myoclonia associated with brief bursts of 2-2 5 Hz generalized discharges  Tonic seizures with eelctrodecrement  There were innumerable tonic seizures; however by 10/25/2017 the frequency of these seizures did decrease in frequency      Continuous video EEG monitoring 12/1-12/5/2017  There are innumerable tonic seizures that are generally less than one minute in duration (30-40 seconds), these consists of subtle eye opening and tonic stiffening of arms, if longer then whole body stiffening with clonic jerking movements  These almost always occur exclusively out of sleep  These consist of 2-2 5 Hz generalized spike-slow wave complexes with generalized attenuation of activity (desynchronization) or paroxysmal fast activity  Per 24 hours count of seizures could be      12/19/2018 routine study  Frequent recurrent tonic seizures associated with paroxysmal generalized fast activity then generalized rhythmic discharges associated with eyes upward deviation, and myoclonic/clonic jerking of the upper body, excess beta activity  6/28-7/3/2019 continuous video EEG monitoring  Frequent clusters of tonic seizures (body rigidity, head flexions, eyes go up with dysconjugate gaze, and clonic activity of the arms for a few seconds), these are associated with GPFA and rhythmic spike-slow waves  There are also bilateral temporal focal epileptiform discharges      Labs:  7/14/2020  Clobazam 37ng/mL  N-Desmethylclobazam 1,585 ng/mL    5/21/2020  Topiramate 7 5mcg/mL    Component      Latest Ref Rng & Units 2/2/2021 2/5/2021   WBC      4 31 - 10 16 Thousand/uL 7 85 5 29   Red Blood Cell Count      3 81 - 5 12 Million/uL 4 08 3 56 (L)   Hemoglobin      11 5 - 15 4 g/dL 13 2 11 6   HCT      34 8 - 46 1 % 41 2 36 5   Platelet Count      737 - 390 Thousands/uL 161 154   Sodium      136 - 145 mmol/L 144 142   Potassium      3 5 - 5 3 mmol/L 3 7 3 5   Chloride      100 - 108 mmol/L 108 108   CO2      21 - 32 mmol/L 22 20 (L)   Anion Gap      4 - 13 mmol/L 14 (H) 14 (H)   BUN      5 - 25 mg/dL 22 8   Creatinine      0 60 - 1 30 mg/dL 0 63 0 44 (L)   Glucose, Random      65 - 140 mg/dL 159 (H) 87   Calcium      8 3 - 10 1 mg/dL 9 4 8 4   CORRECTED CALCIUM      8 3 - 10 1 mg/dL  9 1   AST      5 - 45 U/L 53 (H) 32   ALT      12 - 78 U/L 95 (H) 71   Alkaline Phosphatase      46 - 116 U/L 121 (H) 93   Total Protein      6 4 - 8 2 g/dL 8 2 6 4   Albumin      3 5 - 5 0 g/dL 3 7 3 1 (L)   TOTAL BILIRUBIN 0 20 - 1 00 mg/dL 0 30 0 30   eGFR      ml/min/1 73sq m 113 128   Total CK      26 - 192 U/L 107      General exam   Blood pressure 106/62, pulse 73, resp  rate 18    Appearance: she is excessively sedated unable to wake up to voice; minimal withdrawal of the feet to noxious stimualtion  Carotids: unable to assess  Cardiovascular: regular rate and rhythm and normal heart sounds  Pulmonary: clear to auscultation     HEENT: moist mucus membranes, blood shot eyes, dysconjugate gaze   Fundoscopy: not assessed    Mental status  Orientation: excessively sedated  Fund of Knowledge: nonverbal   Attention and Concentration: nonverbal  Current and Remote Memory:nonverbal  Language: nonverbal    Cranial Nerves  CN 1: not tested  CN 2: Visual fields intact to threat   CN 3, 4, 6: no nystagmus, unable to track  CN 5:not assessed  CN 7:muscles of facial expression are symmetric  CN 8:not assessed  CN 9, 10:not assessed  CN 11:not assessed  CN 12:not assessed    Motor:  Bulk, Tone: low tone throughout all joints of the arms and legs  Pronation: not assessed  Strength: unable to test strength due to excessive sedation  Abnormal movements: She had a tonic seizure this lasted about 20 seconds, she started to open her eyes, eyes deviated upwards, arms were coming up tonic and brief period of clonic upward jerking of the arms    Sensory:  Lighttouch: no reaction to the hands being stroked, withdrawal of feet to stimulation of the soles    Coordination: excessively sedated, unable to follow instructions    Reflexes: not assessed    Past Medical/Surgical History:  Patient Active Problem List   Diagnosis    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    Underweight    Osteoporosis    Onychomycosis    Hyperkeratosis    [facility]bral anoxic injury [facility]    Intractable epilepsy with status epilepticus ([facility] [address] )    Somnolence    Low blood pressure    Incontinence    Skin breakdown    MRSA colonization    Right atrial enlargement    Hypophosphatemia  Sedated due to multiple medications    Breast mass    S/P placement of VNS (vagus nerve stimulation) device    Protein calorie malnutrition (HCC)    Dislodged gastrostomy tube    Fatty infiltration of liver    Intellectual disability with epilepsy ([facility] [address] )    Labyrinthine disorder    Labial cyst    PEG tube malfunction ([facility] [address] )     Past Surgical History:   Procedure Laterality Date    ABDOMINAL SURGERY      CARDIAC PACEMAKER PLACEMENT      vns implant l chest    IR GASTROSTOMY TUBE PLACEMENT  11/21/2019    IR THORACENTESIS  12/17/2018    JEJUNOSTOMY FEEDING TUBE      history of - most recently, PEG tube    PEG TUBE PLACEMENT      OK IMP STIM,CRANIAL,SUBQ,1 ARRAY Left 12/18/2019    Procedure: REPLACEMENT IMPLANTABLE PULSE GENERATOR FOR VAGAL NERVE STIMULATOR, LEFT CHEST;  Surgeon: Barrington Lopez MD;  Location: [facility];  Service: Neurosurgery     Past Psychiatric History:  History of behavioral agitation but she is no longer on a one to one because she is generally too sedated      Medications:    Current Outpatient Medications:     acetaminophen (TYLENOL) 325 mg tablet, Take 650 mg by mouth every 6 (six) hours as needed for mild pain or fever, Disp: , Rfl:     bisacodyl (BISCOLAX) 10 mg suppository, Insert 10 mg into the rectum daily at bedtime as needed for constipation (if no BM day #4), Disp: , Rfl:     bisacodyl (FLEET BISACODYL) 10 MG/30ML ENEM, Insert 10 mg into the rectum as needed for constipation Give at hs on day 5 if suppository is not effective, Disp: , Rfl:     Brivaracetam (Briviact) 10 MG/ML SOLN oral solution, 5 mL (50 mg total) by Per PEG Tube route every 12 (twelve) hours, Disp: 300 mL, Rfl: 5    cannabidiol (Epidiolex) 100 mg/ml SOLN, 4 5 mL (450 mg total) by Per G Tube route 2 (two) times a day, Disp: 270 mL, Rfl: 5    cloBAZam (ONFI) 10 MG tablet, Give one and a half tab at bedtime by G-Tube, Disp: 45 tablet, Rfl: 5    magnesium hydroxide (MILK OF MAGNESIA) 400 mg/5 mL oral suspension, Take 30 mL by mouth daily as needed for constipation If not BM by day 3, Disp: , Rfl:     melatonin 3 mg, 6 mg by Per G Tube route daily at bedtime, Disp: , Rfl:     MELATONIN PO, 6 mg by Per G Tube route daily at bedtime, Disp: , Rfl:     Multiple Vitamins-Minerals (MULTIVITAMIN WITH IRON-MINERALS) liquid, 5 mL by Per G Tube route daily, Disp: , Rfl:     Probiotic Product (ALEXANDER-BID PROBIOTIC PO), 1 tablet by Per G Tube route daily  , Disp: , Rfl:     rufinamide (BANZEL) 400 mg tablet, 4 tablets (1,600 mg total) by Per G Tube route every 12 (twelve) hours, Disp: 240 tablet, Rfl: 5    topiramate (TOPAMAX) 50 MG tablet, 5 tablets (250 mg total) by Per G Tube route every 12 (twelve) hours, Disp: 300 tablet, Rfl: 5    diazePAM (Valtoco 5 MG Dose) 5 MG/0 1ML LIQD, Give one nasal spray to one nostril for seizure lasting more than 3 minutes  May repeat second nasal spray to other nostril if seizure continues for more than 10 minutes  , Disp: 2 each, Rfl: 5    lacosamide (Vimpat) 10 mg/mL, Give by G-tube, give 5mL every 12 hours  , Disp: 300 mL, Rfl: 5    Allergies: Allergies   Allergen Reactions    Felbamate        Family history:  History reviewed  No pertinent family history  There is no family history of seizure, epilepsy or developmental delay  Social History  Living situation:  Resident of [facility] 38   reports that she has never smoked  She has never used smokeless tobacco  She reports previous alcohol use  She reports that she does not use drugs  Review of Systems  A review of at least 12 organ/systems was obtained by the medical assistant and reviewed by me, including additional positives/negatives:  Genitourinary: Negative for dysuria, frequency and urgency  Incontinent of bal and urine   Neurological: Positive for speech difficulty (non  verbal)  Negative for dizziness, tremors, seizures, syncope, facial asymmetry, weakness, light-headedness, numbness and headaches  Psychiatric/Behavioral: Positive for agitation  Decision making was of high-complexity due to the patient's high risk condition (seizures), psychiatric and neuropsychological comorbidities, behavioral problems, memory and cognitive problems and medication side effects  The total amount of time spent with the patient along with pre-chart and post-chart preparation was 50 minutes on the calendar day of the date of service  This included history taking, physical exam, review of ancillary testing, counseling provided to the patient regarding diagnosis, medications, treatment, and risk management, and other communication to the patient's providers and/or family  Start time: 08:05 AM  End time: 08:55AM    Neurology/Epilepsy Procedure Note    VNS Interrogation and reprogramming    Model: SenTiva   S/N: [id number]  Date of implant: 12/18/2019    Current settings:  Output Current 2 0 mAmp   Signal Frequency 20 Hz   Pulse Width 250 microsec   Signal On Time 30 sec   Signal Off Time 1 1 min     AutoStimulation settings:  AutoStim Output 2 0 mAmp   Pulse Width 500 microsec   AutoStim On Time 30 sec     Magnet settings:  Mag Output 2 25 mAmp   Pulse Width 500 microsec   Mag On Time 60 sec     Tachycardia Detection:  enabled  Heartbeat verification: not done  Heartbeat Sensitivity:  4  Threshold:  60%-->40% (reprogrammed the threshold)    Diagnostics:  Communication OK  Output current 2 0mAmp  Lead Impedance OK  Impedance value 1913 Ohms  IFI OK  Battery indicator 50-75%    Lifetime Magnet activation 3  % stimulation 35%    The [place]  No red flags were identified  The patient is low risk for abuse of the prescribed diazepam, clobazam, and lacosamide medications  Safe to proceed with prescription

## 2021-07-08 NOTE — TELEPHONE ENCOUNTER
Ashtyn Cano from [facility] called regarding change in medication for the patient  States that their pharmacy does not have the 5 mg  Nasal spray that was prescribed today and it would be very expensive  Asking if there is anything else comparable to the Oral diazepam solution that she was using previously, or if not, if the patient can go back to taking that  Please call Ashtyn Cano to advise

## 2021-07-08 NOTE — PROGRESS NOTES
Patient ID: Epifanio Kate is a 37 y o  female  Assessment/Plan:    No problem-specific Assessment & Plan notes found for this encounter  {Assess/PlanSmartLinks:81190}       Subjective:    HPI    {[facility] HPI texts:06985}    {Common ambulatory SmartLinks:96963}         Objective: There were no vitals taken for this visit  Physical Exam    Neurological Exam      ROS:    Review of Systems   Constitutional: Negative  Negative for appetite change and fever  HENT: Negative  Negative for hearing loss, tinnitus, trouble swallowing and voice change  Eyes: Negative  Negative for photophobia and pain  Respiratory: Negative  Negative for shortness of breath  Cardiovascular: Negative  Negative for palpitations  Gastrointestinal: Negative  Negative for nausea and vomiting  Endocrine: Negative  Negative for cold intolerance  Genitourinary: Negative for dysuria, frequency and urgency  Incontinent of bal and urine   Musculoskeletal: Negative  Negative for myalgias and neck pain  Skin: Negative  Negative for rash  Allergic/Immunologic: Negative  Neurological: Positive for speech difficulty (non  verbal)  Negative for dizziness, tremors, seizures, syncope, facial asymmetry, weakness, light-headedness, numbness and headaches  Hematological: Negative  Does not bruise/bleed easily  Psychiatric/Behavioral: Positive for agitation  Negative for confusion, hallucinations and sleep disturbance

## 2021-07-12 RX ORDER — DIAZEPAM 5 MG/ML
SOLUTION, CONCENTRATE ORAL
Qty: 30 ML | Refills: 1 | Status: SHIPPED | OUTPATIENT
Start: 2021-07-12

## 2021-07-12 NOTE — TELEPHONE ENCOUNTER
Please discontinue diazepam nasal spray script  Changed to diazepam 5mg/mL oral solution  Give 1mL by PEG tube for seizure lasting more than 3 minutes, may repeat 1 mL if seizure continues after 10 minutes  [place]  No red flags were identified  The patient is low risk for abuse of the prescribed diazepam medication  Safe to proceed with prescription

## 2021-07-18 ENCOUNTER — HOSPITAL ENCOUNTER (INPATIENT)
Facility: HOSPITAL | Age: 41
LOS: 1 days | DRG: 919 | End: 2021-07-20
Attending: EMERGENCY MEDICINE | Admitting: INTERNAL MEDICINE
Payer: MEDICARE

## 2021-07-18 DIAGNOSIS — K94.23 PEG TUBE MALFUNCTION (HCC): Primary | ICD-10-CM

## 2021-07-18 DIAGNOSIS — T85.528A DISLODGED GASTROSTOMY TUBE: ICD-10-CM

## 2021-07-18 LAB
ANION GAP SERPL CALCULATED.3IONS-SCNC: 11 MMOL/L (ref 4–13)
BASOPHILS # BLD AUTO: 0.06 THOUSANDS/ΜL (ref 0–0.1)
BASOPHILS NFR BLD AUTO: 1 % (ref 0–1)
BUN SERPL-MCNC: 20 MG/DL (ref 5–25)
CALCIUM SERPL-MCNC: 9.4 MG/DL (ref 8.3–10.1)
CHLORIDE SERPL-SCNC: 108 MMOL/L (ref 100–108)
CO2 SERPL-SCNC: 24 MMOL/L (ref 21–32)
CREAT SERPL-MCNC: 0.5 MG/DL (ref 0.6–1.3)
EOSINOPHIL # BLD AUTO: 0.16 THOUSAND/ΜL (ref 0–0.61)
EOSINOPHIL NFR BLD AUTO: 2 % (ref 0–6)
ERYTHROCYTE [DISTWIDTH] IN BLOOD BY AUTOMATED COUNT: 13.2 % (ref 11.6–15.1)
GFR SERPL CREATININE-BSD FRML MDRD: 121 ML/MIN/1.73SQ M
GLUCOSE SERPL-MCNC: 96 MG/DL (ref 65–140)
HCT VFR BLD AUTO: 42.6 % (ref 34.8–46.1)
HGB BLD-MCNC: 13.4 G/DL (ref 11.5–15.4)
IMM GRANULOCYTES # BLD AUTO: 0.02 THOUSAND/UL (ref 0–0.2)
IMM GRANULOCYTES NFR BLD AUTO: 0 % (ref 0–2)
LYMPHOCYTES # BLD AUTO: 2.62 THOUSANDS/ΜL (ref 0.6–4.47)
LYMPHOCYTES NFR BLD AUTO: 34 % (ref 14–44)
MCH RBC QN AUTO: 32.4 PG (ref 26.8–34.3)
MCHC RBC AUTO-ENTMCNC: 31.5 G/DL (ref 31.4–37.4)
MCV RBC AUTO: 103 FL (ref 82–98)
MONOCYTES # BLD AUTO: 0.68 THOUSAND/ΜL (ref 0.17–1.22)
MONOCYTES NFR BLD AUTO: 9 % (ref 4–12)
NEUTROPHILS # BLD AUTO: 4.17 THOUSANDS/ΜL (ref 1.85–7.62)
NEUTS SEG NFR BLD AUTO: 54 % (ref 43–75)
NRBC BLD AUTO-RTO: 0 /100 WBCS
PLATELET # BLD AUTO: 184 THOUSANDS/UL (ref 149–390)
PMV BLD AUTO: 11.7 FL (ref 8.9–12.7)
POTASSIUM SERPL-SCNC: 4.4 MMOL/L (ref 3.5–5.3)
RBC # BLD AUTO: 4.14 MILLION/UL (ref 3.81–5.12)
SODIUM SERPL-SCNC: 143 MMOL/L (ref 136–145)
WBC # BLD AUTO: 7.71 THOUSAND/UL (ref 4.31–10.16)

## 2021-07-18 PROCEDURE — 36415 COLL VENOUS BLD VENIPUNCTURE: CPT | Performed by: EMERGENCY MEDICINE

## 2021-07-18 PROCEDURE — 85025 COMPLETE CBC W/AUTO DIFF WBC: CPT | Performed by: EMERGENCY MEDICINE

## 2021-07-18 PROCEDURE — 80048 BASIC METABOLIC PNL TOTAL CA: CPT | Performed by: EMERGENCY MEDICINE

## 2021-07-18 PROCEDURE — 99284 EMERGENCY DEPT VISIT MOD MDM: CPT

## 2021-07-18 PROCEDURE — 99285 EMERGENCY DEPT VISIT HI MDM: CPT | Performed by: EMERGENCY MEDICINE

## 2021-07-19 LAB
ANION GAP SERPL CALCULATED.3IONS-SCNC: 9 MMOL/L (ref 4–13)
BUN SERPL-MCNC: 21 MG/DL (ref 5–25)
CALCIUM SERPL-MCNC: 9.6 MG/DL (ref 8.3–10.1)
CHLORIDE SERPL-SCNC: 111 MMOL/L (ref 100–108)
CO2 SERPL-SCNC: 23 MMOL/L (ref 21–32)
CREAT SERPL-MCNC: 0.54 MG/DL (ref 0.6–1.3)
ERYTHROCYTE [DISTWIDTH] IN BLOOD BY AUTOMATED COUNT: 13.1 % (ref 11.6–15.1)
GFR SERPL CREATININE-BSD FRML MDRD: 118 ML/MIN/1.73SQ M
GLUCOSE P FAST SERPL-MCNC: 104 MG/DL (ref 65–99)
GLUCOSE SERPL-MCNC: 104 MG/DL (ref 65–140)
HCT VFR BLD AUTO: 43.8 % (ref 34.8–46.1)
HGB BLD-MCNC: 13.8 G/DL (ref 11.5–15.4)
MCH RBC QN AUTO: 31.7 PG (ref 26.8–34.3)
MCHC RBC AUTO-ENTMCNC: 31.5 G/DL (ref 31.4–37.4)
MCV RBC AUTO: 101 FL (ref 82–98)
PLATELET # BLD AUTO: 204 THOUSANDS/UL (ref 149–390)
PMV BLD AUTO: 11.4 FL (ref 8.9–12.7)
POTASSIUM SERPL-SCNC: 3.6 MMOL/L (ref 3.5–5.3)
RBC # BLD AUTO: 4.35 MILLION/UL (ref 3.81–5.12)
SODIUM SERPL-SCNC: 143 MMOL/L (ref 136–145)
WBC # BLD AUTO: 6.97 THOUSAND/UL (ref 4.31–10.16)

## 2021-07-19 PROCEDURE — 85027 COMPLETE CBC AUTOMATED: CPT | Performed by: PHYSICIAN ASSISTANT

## 2021-07-19 PROCEDURE — NC001 PR NO CHARGE

## 2021-07-19 PROCEDURE — 99223 1ST HOSP IP/OBS HIGH 75: CPT | Performed by: SPECIALIST

## 2021-07-19 PROCEDURE — 99223 1ST HOSP IP/OBS HIGH 75: CPT | Performed by: INTERNAL MEDICINE

## 2021-07-19 PROCEDURE — 80048 BASIC METABOLIC PNL TOTAL CA: CPT | Performed by: PHYSICIAN ASSISTANT

## 2021-07-19 PROCEDURE — 87081 CULTURE SCREEN ONLY: CPT | Performed by: PHYSICIAN ASSISTANT

## 2021-07-19 PROCEDURE — NC001 PR NO CHARGE: Performed by: INTERNAL MEDICINE

## 2021-07-19 RX ORDER — POTASSIUM CHLORIDE 14.9 MG/ML
20 INJECTION INTRAVENOUS ONCE
Status: COMPLETED | OUTPATIENT
Start: 2021-07-19 | End: 2021-07-19

## 2021-07-19 RX ORDER — DIAZEPAM 5 MG/ML
1 INJECTION, SOLUTION INTRAMUSCULAR; INTRAVENOUS EVERY 6 HOURS
Status: DISCONTINUED | OUTPATIENT
Start: 2021-07-19 | End: 2021-07-20 | Stop reason: HOSPADM

## 2021-07-19 RX ORDER — ONDANSETRON 2 MG/ML
4 INJECTION INTRAMUSCULAR; INTRAVENOUS EVERY 6 HOURS PRN
Status: DISCONTINUED | OUTPATIENT
Start: 2021-07-19 | End: 2021-07-20 | Stop reason: HOSPADM

## 2021-07-19 RX ADMIN — DIAZEPAM 1 MG: 10 INJECTION, SOLUTION INTRAMUSCULAR; INTRAVENOUS at 17:42

## 2021-07-19 RX ADMIN — LEVETIRACETAM 2000 MG: 100 INJECTION, SOLUTION INTRAVENOUS at 17:30

## 2021-07-19 RX ADMIN — POTASSIUM CHLORIDE 20 MEQ: 200 INJECTION, SOLUTION INTRAVENOUS at 12:10

## 2021-07-19 RX ADMIN — SODIUM CHLORIDE 100 MG: 9 INJECTION, SOLUTION INTRAVENOUS at 18:12

## 2021-07-19 RX ADMIN — ENOXAPARIN SODIUM 40 MG: 40 INJECTION SUBCUTANEOUS at 08:22

## 2021-07-19 RX ADMIN — POTASSIUM CHLORIDE 20 MEQ: 200 INJECTION, SOLUTION INTRAVENOUS at 09:29

## 2021-07-19 NOTE — ED NOTES
Patient ripping off blood pressure cuff and pulse ox       Amy Dubose [facility]  07/18/21 2002

## 2021-07-19 NOTE — ASSESSMENT & PLAN NOTE
No recent seizure  reported  Continue PTA medications after tube replacement  Seizure precautions  Continue outpatient neurology follow up

## 2021-07-19 NOTE — CONSULTS
Consultation - General Surgery   Valentin Lopez 37 y o  female MRN: [id number]  Unit/Bed#: 579-86 Encounter: [id number]    Assessment/Plan     Assessment:  Unable to replace gastrostomy tube, 15 [place], at bedside by both this provider today and attempt by the ED physician last evening  It is believed that the fistula tract is now closed  Patient is a history of multiple gastrostomy tube replacements, last replaced in the ED here on May 31st   Initial documentation shows the gastrostomy tube was placed in IR in 2019, however review of past medical records finds that the patient had a PEG tube dating back from 2017  Patient is nonverbal, uncooperative  Lennox -Gastaut Syndrome with tonic seizures  History of anoxic brain damage  Autistic disorder with ADHD  Dysphagia, oropharyngeal phase  Gastrostomy tube dependent for nutrition  Severe protein calorie malnutrition, BMI 17 57  Osteoporosis    Plan:  Patient will need gastroenterology consultation for upper GI and PEG tube replacement tomorrow in the OR under sedation  Nothing by mouth after midnight  The patient will need to be transition to IV equivalent of her oral medications including her seizure meds until the PEG tube can be inserted tomorrow  A m  Labs including CBC, BMP  MRSA culture pending  Dr Michelle Recio is the on-call general surgeon for this hospital today and available via [employer] messaging or telephone contact if needed      History of Present Illness      Please note that the patient is nonverbal, unable to obtain any history at all  Most a historical perspective was obtained from review of old medical records and ED report  HPI:  Valentin Lopez is a 37 y o  female nursing home resident with a history of seizure disorder, severe protein calorie malnutrition and dysphagia requiring gastrostomy tube feedings who presents with another dislodged gastrostomy tube    Patient was last here in the ED on May 31st in the tube was replaced, 14 [address] Knight G-tube  Patient has been difficult and pulling on her gastrostomy tube she has had multiple dislodged feeding tubes over many years  She also was seen here in the ED on June 2nd, abdominal x-ray confirmed that the tube was positioned in the stomach  Yesterday the nursing home staff had noted that the tube was out, unknown if the balloon was still inflated  Attempts by the ED physician to insert another 14 French gastrostomy tube was unsuccessful  Patient is admitted to service of medicine overnight with General surgery consultation requested  The ED physician last night did have telephone conversation with Dr Alexandria Rhodes, general surgeon on call  Record review shows that the patient has had multiple issues with her PEG tube and has had it removed and replaced multiple times in the past   She has a 14 French PEG tube in most recently with 4 cc in the reservoir       Inpatient consult to Acute Care Surgery  Consult performed by: Irena Pineda PA-C  Consult ordered by: Syd Lopez PA-C        Review of Systems   Unable to perform ROS: Patient nonverbal      Historical Information   Past Medical History:   Diagnosis Date    ADHD     [facility] [address] [facility]     Autistic disorder     Breakthrough seizure ([facility] [address] ) 8/1/2019    Dysphagia     Dysphagia, oropharyngeal phase     Gastrostomy tube dependent ([address] )     14 Fr as of 05/19/2020    Hyperammonemia (HCC)     Hyperkeratosis     Hypotension     Intellectual disability     Lennox-Gastaut syndrome with tonic seizures ([address] )     Lethargy     Liver enzyme elevation     Onychomycosis     Osteoporosis     Osteoporosis      Past Surgical History:   Procedure Laterality Date    ABDOMINAL SURGERY      CARDIAC PACEMAKER PLACEMENT      vns implant l chest    IR GASTROSTOMY TUBE PLACEMENT  11/21/2019    IR THORACENTESIS  12/17/2018    JEJUNOSTOMY FEEDING TUBE      history of - most recently, PEG tube    PEG TUBE PLACEMENT      RI IMP STIM,CRANIAL,SUBQ,1 ARRAY Left 12/18/2019    Procedure: REPLACEMENT IMPLANTABLE PULSE GENERATOR FOR VAGAL NERVE STIMULATOR, LEFT CHEST;  Surgeon: Gilmar Russo MD;  [facility];  Service: Neurosurgery     Social History   Social History     Substance and Sexual Activity   Alcohol Use Not Currently     Social History     Substance and Sexual Activity   Drug Use No     E-Cigarette/Vaping    E-Cigarette Use Never User      E-Cigarette/Vaping Substances     Social History     Tobacco Use   Smoking Status Never Smoker   Smokeless Tobacco Never Used     Family History: non-contributory    Meds/Allergies   current meds:   Current Facility-Administered Medications   Medication Dose Route Frequency    enoxaparin (LOVENOX) subcutaneous injection 40 mg  40 mg Subcutaneous Daily    ondansetron (ZOFRAN) injection 4 mg  4 mg Intravenous Q6H PRN     Allergies   Allergen Reactions    Felbamate        Objective   First Vitals:   Blood Pressure: 104/66 (07/18/21 1939)  Pulse: 84 (07/18/21 1939)  Temperature: (!) 97 4 °F (36 3 °C) (07/18/21 1939)  Temp Source: Tympanic (07/19/21 0038)  Respirations: 18 (07/18/21 1939)  Weight - Scale: 45 4 kg (100 lb 1 4 oz) (07/18/21 1939)  SpO2: 96 % (07/18/21 1939)    Current Vitals:   Blood Pressure: 97/65 (07/19/21 0707)  Pulse: 75 (07/19/21 0707)  Temperature: 97 5 °F (36 4 °C) (07/19/21 0739)  Temp Source: Tympanic (07/19/21 0739)  Respirations: 16 (07/19/21 0707)  Weight - Scale: 45 kg (99 lb 3 3 oz) (07/19/21 0551)  SpO2: 97 % (07/19/21 0707)    No intake or output data in the 24 hours ending 07/19/21 0752    Invasive Devices     Peripheral Intravenous Line            Peripheral IV 07/18/21 Right Hand <1 day          Drain            Gastrostomy/Enterostomy Gastrostomy 14 Fr   days                Physical Exam     Thin, muscle wasting noted  She is uncooperative  She is lying on the left side rolled up in a fetal position  No eye contact is maintained    Entirely nonverbal   Oral mucosa is dry, poor teeth noted  She does not follow any commands  She does open her eyes to verbal stimuli  Heart regular rate and rhythm  Lungs poor inspiratory effort, no rales or rhonchi heard  Back mild kyphotic curvature  Abdomen is scaphoid  Bowel sounds are normal active throughout  The left upper quadrant fistula tract was inspected, no drainage or bleeding  Attempt at reinsertion of a 14 German gastrostomy tube was unsuccessful  The abdomen is soft and nontender  Lower legs both semi contracted  No tremor noted  Neurological exam was very difficult to perform in this patient  Lab Results:   I have personally reviewed pertinent lab results  , CBC:   Lab Results   Component Value Date    WBC 6 97 07/19/2021    HGB 13 8 07/19/2021    HCT 43 8 07/19/2021     (H) 07/19/2021     07/19/2021    MCH 31 7 07/19/2021    MCHC 31 5 07/19/2021    RDW 13 1 07/19/2021    MPV 11 4 07/19/2021    NRBC 0 07/18/2021   , CMP:   Lab Results   Component Value Date    SODIUM 143 07/19/2021    K 3 6 07/19/2021     (H) 07/19/2021    CO2 23 07/19/2021    BUN 21 07/19/2021    CREATININE 0 54 (L) 07/19/2021    CALCIUM 9 6 07/19/2021    EGFR 118 07/19/2021   , Coagulation: No results found for: PT, INR, APTT, Urinalysis: No results found for: Prosper Saber, SPECGRAV, PHUR, LEUKOCYTESUR, NITRITE, PROTEINUA, GLUCOSEU, KETONESU, BILIRUBINUR, BLOODU  Imaging: I have personally reviewed pertinent reports  EKG, Pathology, and Other Studies: I have personally reviewed pertinent reports  Counseling / Coordination of Care  Total floor / unit time spent today 50 minutes  Greater than 50% of total time was spent with the patient and / or family counseling and / or coordination of care    A description of the counseling / coordination of care:  Review of old medical records, attempt at reinsertion of gastrostomy tube at bedside was unsuccessful, discussion with the attending hospitalist physician and beny text messaging with the on-call surgeon covering the hospital today, Dr Anton Carpenter PA-C

## 2021-07-19 NOTE — H&P
[address]  H&P- Kathia Singh 1980, 37 y o  female MRN: [id number]  Unit/Bed#: 633-05 Encounter: [id number]  Primary Care Provider: Izzy Henry DO   Date and time admitted to hospital: 7/18/2021  7:34 PM    * Dislodged gastrostomy tube  Assessment & Plan  Presented to the ER for replacement of PEG tube  Attempts to replace in the ER unsuccessful  ER attending  discussed with general surgery  Admit on observation  Hold PEG tube medications  General surgery consult  Supportive care    Lennox-Gastaut syndrome [facility])  Assessment & Plan  No recent seizure  reported  Continue PTA medications after tube replacement  Seizure precautions  Continue outpatient neurology follow up    Cerebral anoxic injury [facility])  Assessment & Plan  Continue outpatient Neurology follow up    VTE Prophylaxis: Enoxaparin (Lovenox)  / sequential compression device   Code Status: Level 1- Full code  POLST: POLST form is not discussed and not completed at this time  Discussion with family: None    Anticipated Length of Stay:  Patient will be admitted on an Observation basis with an anticipated length of stay of  < 2 midnights  Justification for Hospital Stay: Dislodged PEG tube    Total Time for Visit, including Counseling / Coordination of Care: 30 minutes  Greater than 50% of this total time spent on direct patient counseling and coordination of care  Chief Complaint:   Dislodged PEG tube    History of Present Illness:    Kathia Singh is a 37 y o  female who presents to the ER for replacement of PEG tube which was dislodged at the SNF  Patient had several visits to the ER for dislodged PEG tube  Patient is at baseline mental status  She is none verbal  Labs completed in the Er with results as shown below  ER Attending had attempts to replace tube without success   Case discussed with Dr Cole Car (General surgery) General surgery Team Will evaluated patient in the AM  At bedside patient is awake, at baseline mental status, does not appear to be in any acute distress  Patient is being admitted on Observation status med surg level care for further management of Dislodged PEG tube  Review of Systems:    Review of Systems   Unable to perform ROS: Patient nonverbal       Past Medical and Surgical History:     Past Medical History:   Diagnosis Date    ADHD     Anoxic [facility] damage ([facility] [address] [facility]     Autistic disorder     Breakthrough seizure ([facility] [address] [facility] 8/1/2019    Dysphagia     Dysphagia, oropharyngeal phase     Gastrostomy tube dependent ([facility] [address] )     14 Fr as of 05/19/2020    Hyperammonemia (HCC)     Hyperkeratosis     Hypotension     Intellectual disability     Lennox-Gastaut syndrome with tonic seizures ([facility] [address] )     Lethargy     Liver enzyme elevation     Onychomycosis     Osteoporosis     Osteoporosis        Past Surgical History:   Procedure Laterality Date    ABDOMINAL SURGERY      CARDIAC PACEMAKER PLACEMENT      vns implant l chest    IR GASTROSTOMY TUBE PLACEMENT  11/21/2019    IR THORACENTESIS  12/17/2018    JEJUNOSTOMY FEEDING TUBE      history of - most recently, PEG tube    PEG TUBE PLACEMENT      DE IMP STIM,CRANIAL,SUBQ,1 ARRAY Left 12/18/2019    Procedure: REPLACEMENT IMPLANTABLE PULSE GENERATOR FOR VAGAL NERVE STIMULATOR, LEFT CHEST;  Surgeon: Macey Nunez MD;  [facility];  Service: Neurosurgery       Meds/Allergies:    Prior to Admission medications    Medication Sig Start Date End Date Taking?  Authorizing Provider   acetaminophen (TYLENOL) 325 mg tablet Take 650 mg by mouth every 6 (six) hours as needed for mild pain or fever    Historical Provider, MD   bisacodyl (BISCOLAX) 10 mg suppository Insert 10 mg into the rectum daily at bedtime as needed for constipation (if no BM day #4)    Historical Provider, MD   bisacodyl (FLEET BISACODYL) 10 MG/30ML ENEM Insert 10 mg into the rectum as needed for constipation Give at hs on day 5 if suppository is not effective    Historical Provider, MD Brivaracetam (Briviact) 10 MG/ML SOLN oral solution 5 mL (50 mg total) by Per PEG Tube route every 12 (twelve) hours 7/8/21   Escobar Knox MD   cannabidiol (Epidiolex) 100 mg/ml SOLN 4 5 mL (450 mg total) by Per G Tube route 2 (two) times a day 7/8/21   Escobar Knox MD   cloBAZam (ONFI) 10 MG tablet Give one and a half tab at bedtime by G-Tube 7/8/21   Escobar Knox MD   diazepam (VALIUM) 5 MG/ML solution If the patient has a seizure lasting more than 3 minutes then give 1mL by PEG tube, may repeat 1mL if she continues to have seizure for more than 10 minutes  7/12/21   Escobar Knox MD   lacosamide (Vimpat) 10 mg/mL Give by G-tube, give 5mL every 12 hours  7/8/21   Escobar Knox MD   magnesium hydroxide (MILK OF MAGNESIA) 400 mg/5 mL oral suspension Take 30 mL by mouth daily as needed for constipation If not BM by day 3    Historical Provider, MD   melatonin 3 mg 6 mg by Per G Tube route daily at bedtime    Historical Provider, MD   MELATONIN PO 6 mg by Per G Tube route daily at bedtime    Historical Provider, MD   Multiple Vitamins-Minerals (MULTIVITAMIN WITH IRON-MINERALS) liquid 5 mL by Per G Tube route daily    Historical Provider, MD   Probiotic Product (ALEXANDER-BID PROBIOTIC PO) 1 tablet by Per G Tube route daily      Historical Provider, MD   rufinamide (BANZEL) 400 mg tablet 4 tablets (1,600 mg total) by Per G Tube route every 12 (twelve) hours 7/8/21   Escobar Knox MD   topiramate (TOPAMAX) 50 MG tablet 5 tablets (250 mg total) by Per G Tube route every 12 (twelve) hours 7/8/21   Escobar Konx MD     I have reveiwed home medications using records provided by [facility]  Allergies:    Allergies   Allergen Reactions    Felbamate        Social History:     Marital Status: Single   Occupation: Disabled  Patient Pre-hospital Living Situation: [facility]  Patient Pre-hospital Level of Mobility: acitve  Patient Pre-hospital Diet Restrictions: PEG tube feedings  Substance Use History:   Social History Substance and Sexual Activity   Alcohol Use Not Currently     Social History     Tobacco Use   Smoking Status Never Smoker   Smokeless Tobacco Never Used     Social History     Substance and Sexual Activity   Drug Use No       Family History:    History reviewed  No pertinent family history  Physical Exam:     Vitals:   Blood Pressure: 98/60 (07/19/21 0038)  Pulse: 75 (07/19/21 0038)  Temperature: 98 1 °F (36 7 °C) (07/19/21 0038)  Temp Source: Tympanic (07/19/21 0038)  Respirations: 18 (07/19/21 0038)  Weight - Scale: 45 4 kg (100 lb 1 4 oz) (07/18/21 1939)  SpO2: 96 % (07/19/21 0037)    Physical Exam  Constitutional:       Appearance: She is not diaphoretic  HENT:      Head: Normocephalic and atraumatic  Mouth/Throat:      Mouth: Mucous membranes are moist       Pharynx: Oropharynx is clear  Eyes:      Pupils: Pupils are equal, round, and reactive to light  Cardiovascular:      Rate and Rhythm: Normal rate and regular rhythm  Pulses: Normal pulses  Pulmonary:      Effort: No respiratory distress  Breath sounds: No wheezing, rhonchi or rales  Abdominal:      General: There is no distension  Palpations: Abdomen is soft  Tenderness: There is no abdominal tenderness  Musculoskeletal:      Right lower leg: No edema  Left lower leg: No edema  Skin:     Coloration: Skin is not jaundiced or pale  Findings: No erythema  Neurological:      Mental Status: Mental status is at baseline  Additional Data:     Lab Results: I have personally reviewed pertinent reports        Results from last 7 days   Lab Units 07/18/21  2335   WBC Thousand/uL 7 71   HEMOGLOBIN g/dL 13 4   HEMATOCRIT % 42 6   PLATELETS Thousands/uL 184   NEUTROS PCT % 54   LYMPHS PCT % 34   MONOS PCT % 9   EOS PCT % 2     Results from last 7 days   Lab Units 07/18/21  2335   SODIUM mmol/L 143   POTASSIUM mmol/L 4 4   CHLORIDE mmol/L 108   CO2 mmol/L 24   BUN mg/dL 20   CREATININE mg/dL 0 50*   ANION GAP mmol/L 11   CALCIUM mg/dL 9 4   GLUCOSE RANDOM mg/dL 96                       Imaging: I have personally reviewed pertinent reports  No orders to display       EKG, Pathology, and Other Studies Reviewed on Admission:   · EKG:     Allscripts / Epic Records Reviewed: Yes     ** Please Note: This note has been constructed using a voice recognition system   **

## 2021-07-19 NOTE — ED NOTES
Multiple attempts made for IV access without success  BMP collected, unable to collect CBC at this time        Bert Horton, RN  07/18/21 8232

## 2021-07-19 NOTE — ED PROVIDER NOTES
Final Diagnosis:  1  Dislodged gastrostomy tube        Chief Complaint   Patient presents with    Feeding Tube Problem     pt pulled out peg tube     HPI  Patient presents for evaluation of PEG tube removal   Per history related by EMS team the patient removed her PEG tube earlier today, they were contacted by nursing facility, and they brought her in for further evaluation  Patient offers no history she is nonverbal     Record review shows that the patient has had multiple issues with her PEG tube and has had it removed and replaced multiple times in the past   She has a 14 Salv[place]orean PEG tube in most recently with 4 cc in the reservoir  Unless otherwise specified:  - No language barrier    - History obtained from patient  - There are no limitations to the history obtained  - Previous charting was reviewed    PMH:   has a past medical history of ADHD, Anoxic brain damage ([address] ), Autistic disorder, Breakthrough seizure ([address] ) (8/1/2019), Dysphagia, Dysphagia, oropharyngeal phase, Gastrostomy tube dependent ([address] ), Hyperammonemia ([address] ), Hyperkeratosis, Hypotension, Intellectual disability, Lennox-Gastaut syndrome with tonic seizures ([address] ), Lethargy, Liver enzyme elevation, Onychomycosis, Osteoporosis, and Osteoporosis  PSH:   has a past surgical history that includes Jejunostomy feeding tube; PEG tube placement; Abdominal surgery; IR thoracentesis (12/17/2018); Cardiac pacemaker placement; IR gastrostomy tube placement (11/21/2019); and pr imp stim,cranial,subq,1 array (Left, 12/18/2019)  ROS:  Review of Systems   Unable to perform ROS: Patient nonverbal        PE:   Vitals:    07/18/21 1939   BP: 104/66   BP Location: Right arm   Pulse: 84   Resp: 18   Temp: (!) 97 4 °F (36 3 °C)   SpO2: 96%   Weight: 45 4 kg (100 lb 1 4 oz)     Vitals reviewed by me  Physical Exam  Vitals reviewed  Constitutional:       General: She is not in acute distress  Appearance: She is not diaphoretic        Comments: Patient laying in left lateral decubitus position   HENT:      Head: Atraumatic  Right Ear: External ear normal       Left Ear: External ear normal    Eyes:      General: No scleral icterus  Right eye: No discharge  Left eye: No discharge  Conjunctiva/sclera: Conjunctivae normal       Pupils: Pupils are equal, round, and reactive to light  Neck:      Vascular: No JVD  Cardiovascular:      Rate and Rhythm: Normal rate and regular rhythm  Heart sounds: Normal heart sounds  No murmur heard  No friction rub  No gallop  Pulmonary:      Effort: Pulmonary effort is normal  No respiratory distress  Breath sounds: Normal breath sounds  No wheezing or rales  Abdominal:      General: Bowel sounds are normal  There is no distension  Palpations: Abdomen is soft  There is no mass  Tenderness: There is no abdominal tenderness  There is no guarding  Musculoskeletal:         General: No tenderness or deformity  Normal range of motion  Cervical back: Normal range of motion and neck supple  Skin:     General: Skin is warm  Neurological:      Mental Status: She is alert and oriented to person, place, and time  Cranial Nerves: No cranial nerve deficit  Sensory: No sensory deficit  Motor: No abnormal muscle tone  Coordination: Coordination normal    Psychiatric:         Behavior: Behavior normal          Thought Content: Thought content normal          Judgment: Judgment normal           A:  - Nursing note reviewed  ED Course as of Jul 19 0005   Nilesh Domínguez Jul 18, 2021   2337 Attempted to reach out to patient's father, home number as well as cell number, without success          No orders to display     Orders Placed This Encounter   Procedures    CBC and differential    Basic metabolic panel    Insert peripheral IV    Place in Observation     Labs Reviewed   CBC AND DIFFERENTIAL - Abnormal       Result Value Ref Range Status    WBC 7 71 4 31 - 10 16 Thousand/uL Final    RBC 4 14  3 81 - 5 12 Million/uL Final    Hemoglobin 13 4  11 5 - 15 4 g/dL Final    Hematocrit 42 6  34 8 - 46 1 % Final     (*) 82 - 98 fL Final    MCH 32 4  26 8 - 34 3 pg Final    MCHC 31 5  31 4 - 37 4 g/dL Final    RDW 13 2  11 6 - 15 1 % Final    MPV 11 7  8 9 - 12 7 fL Final    Platelets 765  560 - 390 Thousands/uL Final    nRBC 0  /100 WBCs Final    Neutrophils Relative 54  43 - 75 % Final    Immat GRANS % 0  0 - 2 % Final    Lymphocytes Relative 34  14 - 44 % Final    Monocytes Relative 9  4 - 12 % Final    Eosinophils Relative 2  0 - 6 % Final    Basophils Relative 1  0 - 1 % Final    Neutrophils Absolute 4 17  1 85 - 7 62 Thousands/µL Final    Immature Grans Absolute 0 02  0 00 - 0 20 Thousand/uL Final    Lymphocytes Absolute 2 62  0 60 - 4 47 Thousands/µL Final    Monocytes Absolute 0 68  0 17 - 1 22 Thousand/µL Final    Eosinophils Absolute 0 16  0 00 - 0 61 Thousand/µL Final    Basophils Absolute 0 06  0 00 - 0 10 Thousands/µL Final   BASIC METABOLIC PANEL - Abnormal    Sodium 143  136 - 145 mmol/L Final    Potassium 4 4  3 5 - 5 3 mmol/L Final    Comment: Slightly Hemolyzed; Results May be Affected    Chloride 108  100 - 108 mmol/L Final    CO2 24  21 - 32 mmol/L Final    ANION GAP 11  4 - 13 mmol/L Final    BUN 20  5 - 25 mg/dL Final    Creatinine 0 50 (*) 0 60 - 1 30 mg/dL Final    Comment: Standardized to IDMS reference method    Glucose 96  65 - 140 mg/dL Final    Comment: If the patient is fasting, the ADA then defines impaired fasting glucose as > 100 mg/dL and diabetes as > or equal to 123 mg/dL  Specimen collection should occur prior to Sulfasalazine administration due to the potential for falsely depressed results  Specimen collection should occur prior to Sulfapyridine administration due to the potential for falsely elevated results      Calcium 9 4  8 3 - 10 1 mg/dL Final    eGFR 121  ml/min/1 73sq m Final    Narrative:     National Kidney Disease Foundation guidelines for Chronic Kidney Disease (CKD):     Stage 1 with normal or high GFR (GFR > 90 mL/min/1 73 square meters)    Stage 2 Mild CKD (GFR = 60-89 mL/min/1 73 square meters)    Stage 3A Moderate CKD (GFR = 45-59 mL/min/1 73 square meters)    Stage 3B Moderate CKD (GFR = 30-44 mL/min/1 73 square meters)    Stage 4 Severe CKD (GFR = 15-29 mL/min/1 73 square meters)    Stage 5 End Stage CKD (GFR <15 mL/min/1 73 square meters)  Note: GFR calculation is accurate only with a steady state creatinine         Final Diagnosis:  1  Dislodged gastrostomy tube        P:  - attempted placement of 15 French without success in the emergency department  It is possible that the tract is closed at this point  I reach out to surgery on-call, Dr Becky Sánchez, who stated that they would pass on to the surgeon tomorrow [facility], that the patient need to be evaluated for PEG tube placement  Discussed with fernando and admitted the patient observation for PEG tube placement tomorrow   -attempted to reach out to patient's father twice and received voicemail  Medications - No data to display  Time reflects when diagnosis was documented in both MDM as applicable and the Disposition within this note     Time User Action Codes Description Comment    2021 12:03 AM Seven Manuel Add [[id number]] Dislodged gastrostomy tube       ED Disposition     ED Disposition Condition Date/Time Comment    Admit Stable  12:03 AM Case was discussed with FERNANDO and the patient's admission status was agreed to be Admission Status: observation status to the service of Dr Nelson Pastor   Follow-up Information    None       Patient's Medications   Discharge Prescriptions    No medications on file     No discharge procedures on file  Prior to Admission Medications   Prescriptions Last Dose Informant Patient Reported? Taking?    Brivaracetam (Briviact) 10 MG/ML SOLN oral solution   No No   Si mL (50 mg total) by Per PEG Tube route every 12 (twelve) hours   MELATONIN PO  Outside Facility (Specify) Yes No   Si mg by Per G Tube route daily at bedtime   Multiple Vitamins-Minerals (MULTIVITAMIN WITH IRON-MINERALS) liquid  Outside Facility (Specify) Yes No   Si mL by Per G Tube route daily   Probiotic Product (ALEXANDER-BID PROBIOTIC PO)  Outside Facility (Specify) Yes No   Si tablet by Per G Tube route daily     acetaminophen (TYLENOL) 325 mg tablet   Yes No   Sig: Take 650 mg by mouth every 6 (six) hours as needed for mild pain or fever   bisacodyl (BISCOLAX) 10 mg suppository  Outside Facility (Specify) Yes No   Sig: Insert 10 mg into the rectum daily at bedtime as needed for constipation (if no BM day #4)   bisacodyl (FLEET BISACODYL) 10 MG/30ML ENEM   Yes No   Sig: Insert 10 mg into the rectum as needed for constipation Give at hs on day 5 if suppository is not effective   cannabidiol (Epidiolex) 100 mg/ml SOLN   No No   Si 5 mL (450 mg total) by Per G Tube route 2 (two) times a day   cloBAZam (ONFI) 10 MG tablet   No No   Sig: Give one and a half tab at bedtime by G-Tube   diazepam (VALIUM) 5 MG/ML solution   No No   Sig: If the patient has a seizure lasting more than 3 minutes then give 1mL by PEG tube, may repeat 1mL if she continues to have seizure for more than 10 minutes  lacosamide (Vimpat) 10 mg/mL   No No   Sig: Give by G-tube, give 5mL every 12 hours     magnesium hydroxide (MILK OF MAGNESIA) 400 mg/5 mL oral suspension  Outside Facility (Specify) Yes No   Sig: Take 30 mL by mouth daily as needed for constipation If not BM by day 3   melatonin 3 mg   Yes No   Si mg by Per G Tube route daily at bedtime   rufinamide (BANZEL) 400 mg tablet   No No   Si tablets (1,600 mg total) by Per G Tube route every 12 (twelve) hours   topiramate (TOPAMAX) 50 MG tablet   No No   Si tablets (250 mg total) by Per G Tube route every 12 (twelve) hours      Facility-Administered Medications: None       Portions of the record may have been created with voice recognition software  Occasional wrong word or "sound a like" substitutions may have occurred due to the inherent limitations of voice recognition software  Read the chart carefully and recognize, using context, where substitutions have occurred      Electronically signed by:  MD Barbie Mathews MD  07/19/21 0005

## 2021-07-19 NOTE — ASSESSMENT & PLAN NOTE
Presented to the ER for replacement of PEG tube  Attempts to replace in the ER unsuccessful  ER attending  discussed with general surgery  Admit on observation  Hold PEG tube medications  General surgery consult  Supportive care

## 2021-07-19 NOTE — PLAN OF CARE
Problem: Potential for Falls  Goal: Patient will remain free of falls  Description: INTERVENTIONS:  - Educate patient/family on patient safety including physical limitations  - Instruct patient to call for assistance with activity   - Consult OT/PT to assist with strengthening/mobility   - Keep Call bell within reach  - Keep bed low and locked with side rails adjusted as appropriate  - Keep care items and personal belongings within reach  - Initiate and maintain comfort rounds  - Make Fall Risk Sign visible to staff  - Offer Toileting every 2 Hours, in advance of need  - Initiate/Maintain bedalarm  - Obtain necessary fall risk management equipment: bed alarm/continual observation  - Apply yellow socks and bracelet for high fall risk patients  - Consider moving patient to room near nurses station  Outcome: Progressing     Problem: MOBILITY - ADULT  Goal: Maintain or return to baseline ADL function  Description: INTERVENTIONS:  -  Assess patient's ability to carry out ADLs; assess patient's baseline for ADL function and identify physical deficits which impact ability to perform ADLs (bathing, care of mouth/teeth, toileting, grooming, dressing, etc )  - Assess/evaluate cause of self-care deficits   - Assess range of motion  - Assess patient's mobility; develop plan if impaired  - Assess patient's need for assistive devices and provide as appropriate  - Encourage maximum independence but intervene and supervise when necessary  - Involve family in performance of ADLs  - Assess for home care needs following discharge   - Consider OT consult to assist with ADL evaluation and planning for discharge  - Provide patient education as appropriate  Outcome: Progressing  Goal: Maintains/Returns to pre admission functional level  Description: INTERVENTIONS:  - Perform BMAT or MOVE assessment daily    - Set and communicate daily mobility goal to care team and patient/family/caregiver     - Collaborate with rehabilitation services on mobility goals if consulted  - Perform Range of Motion 3 times a day  - Reposition patient every 2 hours  - Record patient progress and toleration of activity level   Outcome: Progressing     Problem: NEUROSENSORY - ADULT  Goal: Achieves stable or improved neurological status  Description: INTERVENTIONS  - Monitor and report changes in neurological status  - Monitor vital signs such as temperature, blood pressure, glucose, and any other labs ordered   - Initiate measures to prevent increased intracranial pressure  - Monitor for seizure activity and implement precautions if appropriate      Outcome: Progressing  Goal: Remains free of injury related to seizures activity  Description: INTERVENTIONS  - Maintain airway, patient safety  and administer oxygen as ordered  - Monitor patient for seizure activity, document and report duration and description of seizure to physician/advanced practitioner  - If seizure occurs,  ensure patient safety during seizure  - Reorient patient post seizure  - Seizure pads on all 4 side rails  - Instruct staff on continual observation to notify RN of any seizure activity including if an aura is experienced  - Instruct patient/family to call for assistance with activity based on nursing assessment  - Administer anti-seizure medications if ordered    Outcome: Progressing  Goal: Achieves maximal functionality and self care  Description: INTERVENTIONS  - Monitor swallowing and airway patency with patient fatigue and changes in neurological status  - Encourage and assist patient to increase activity and self care     - Encourage visually impaired, hearing impaired and aphasic patients to use assistive/communication devices  Outcome: Progressing     Problem: GASTROINTESTINAL - ADULT  Goal: Minimal or absence of nausea and/or vomiting  Description: INTERVENTIONS:  - Administer IV fluids if ordered to ensure adequate hydration  - Maintain NPO status until nausea and vomiting are resolved  - Administer ordered antiemetic medications as needed  - Provide nonpharmacologic comfort measures as appropriate  - Advance diet as tolerated, if ordered  - Consider nutrition services referral to assist patient with adequate nutrition and appropriate food choices  Outcome: Progressing  Goal: Maintains adequate nutritional intake  Description: INTERVENTIONS:  - Monitor percentage of each meal consumed  - Identify factors contributing to decreased intake, treat as appropriate  - Assist with meals as needed  - Monitor I&O, weight, and lab values if indicated  - Obtain nutrition services referral as needed  Outcome: Progressing  Goal: Oral mucous membranes remain intact  Description: INTERVENTIONS  - Assess oral mucosa and hygiene practices  - Implement preventative oral hygiene regimen  - Implement oral medicated treatments as ordered  - Initiate Nutrition services referral as needed  Outcome: Progressing     Problem: SKIN/TISSUE INTEGRITY - ADULT  Goal: Skin Integrity remains intact(Skin Breakdown Prevention)  Description: Assess:  -Perform Tyson assessment every shift  -Clean and moisturize skin every two hours  -Inspect skin when repositioning, toileting, and assisting with ADLS  -Assess under medical devices such as masimo every two hours   -Assess extremities for adequate circulation and sensation     Bed Management:  -Have minimal linens on bed & keep smooth, unwrinkled  -Change linens as needed when moist or perspiring  -Avoid sitting or lying in one position for more than 2 hours while in bed  -Keep HOB at 45degrees     Toileting:  -Offer bedside commode  -Assess for incontinence every 2hours  -Use incontinent care products after each incontinent episode such as incontinent cleanser/hydraguard    Activity:  -Encourage activity and walks on unit  -Encourage or provide ROM exercises   -Turn and reposition patient every 2 Hours  -Use appropriate equipment to lift or move patient in bed    Skin Care:  -Avoid use of baby powder, tape, friction and shearing, hot water or constrictive clothing  -Relieve pressure over bony prominences using wedges  -Do not massage red bony areas    Problem: PAIN - ADULT  Goal: Verbalizes/displays adequate comfort level or baseline comfort level  Description: Interventions:  - Assess pain using appropriate pain scale : FLACC  - Administer analgesics based on type and severity of pain and evaluate response  - Implement non-pharmacological measures as appropriate and evaluate response  - Consider cultural and social influences on pain and pain management  - Notify physician/advanced practitioner if interventions unsuccessful or patient reports new pain  Outcome: Progressing     Problem: INFECTION - ADULT  Goal: Absence or prevention of progression during hospitalization  Description: INTERVENTIONS:  - Assess and monitor for signs and symptoms of infection  - Monitor lab/diagnostic results  - Monitor all insertion sites, i e  indwelling lines, tubes, and drains  - [place] appropriate cooling/warming therapies per order  - Administer medications as ordered  - Instruct and encourage patient and family to use good hand hygiene technique  - Identify and instruct in appropriate isolation precautions for identified infection/condition  Outcome: Progressing     Problem: SAFETY ADULT  Goal: Patient will remain free of falls  Description: INTERVENTIONS:  - Educate patient/family on patient safety including physical limitations  - Instruct patient to call for assistance with activity   - Consult OT/PT to assist with strengthening/mobility   - Keep Call bell within reach  - Keep bed low and locked with side rails adjusted as appropriate  - Keep care items and personal belongings within reach  - Initiate and maintain comfort rounds  - Make Fall Risk Sign visible to staff  - Offer Toileting every 2 Hours, in advance of need  - Initiate/Maintain bed alarm  - Obtain necessary fall risk management equipment: bed alarm/continual observaton   - Apply yellow socks and bracelet for high fall risk patients  - Consider moving patient to room near nurses station  Outcome: Progressing  Goal: Maintain or return to baseline ADL function  Description: INTERVENTIONS:  -  Assess patient's ability to carry out ADLs; assess patient's baseline for ADL function and identify physical deficits which impact ability to perform ADLs (bathing, care of mouth/teeth, toileting, grooming, dressing, etc )  - Assess/evaluate cause of self-care deficits   - Assess range of motion  - Assess patient's mobility; develop plan if impaired  - Assess patient's need for assistive devices and provide as appropriate  - Encourage maximum independence but intervene and supervise when necessary  - Involve family in performance of ADLs  - Assess for home care needs following discharge   - Consider OT consult to assist with ADL evaluation and planning for discharge  - Provide patient education as appropriate  Outcome: Progressing  Goal: Maintains/Returns to pre admission functional level  Description: INTERVENTIONS:  - Perform BMAT or MOVE assessment daily    - Set and communicate daily mobility goal to care team and patient/family/caregiver  - Collaborate with rehabilitation services on mobility goals if consulted  - Perform Range of Motion 3 times a day  - Reposition patient every 2 hours    - Record patient progress and toleration of activity level   Outcome: Progressing     Problem: DISCHARGE PLANNING  Goal: Discharge to home or other facility with appropriate resources  Description: INTERVENTIONS:  - Identify barriers to discharge w/patient and caregiver  - Arrange for needed discharge resources and transportation as appropriate  - Identify discharge learning needs (meds, wound care, etc )  - Arrange for interpretive services to assist at discharge as needed  - Refer to Case Management Department for coordinating discharge planning if the patient needs post-hospital services based on physician/advanced practitioner order or complex needs related to functional status, cognitive ability, or social support system  Outcome: Progressing     Problem: Knowledge Deficit  Goal: Patient/family/caregiver demonstrates understanding of disease process, treatment plan, medications, and discharge instructions  Description: Complete learning assessment and assess knowledge base    Interventions:  - Provide teaching at level of understanding  - Provide teaching via preferred learning methods  Outcome: Not Progressing

## 2021-07-19 NOTE — CASE MANAGEMENT
Met with pt to discuss role as  in helping pt to develop discharge plan and to help pt carry out their plan  Pt's current LOS is  1 day   Pt is not a 30 day readmission  Pt is a resident at [facility]  Pt is a 15 day MA bedhold  Pt requires max assist with ADL's   Pt is an observation and needs OBS status paper signed   I called Maria Elena Wilks at [facility] And she said I send all paperwork to [employer] at [employer] ([phone]) and left message for her to Return my call

## 2021-07-20 ENCOUNTER — HOSPITAL ENCOUNTER (INPATIENT)
Facility: HOSPITAL | Age: 41
LOS: 2 days | Discharge: NURSING, REHAB OR LONG-TERM CARE | DRG: 394 | End: 2021-07-22
Attending: INTERNAL MEDICINE | Admitting: INTERNAL MEDICINE
Payer: MEDICARE

## 2021-07-20 DIAGNOSIS — K94.23 PEG TUBE MALFUNCTION (HCC): Primary | ICD-10-CM

## 2021-07-20 LAB
ANION GAP SERPL CALCULATED.3IONS-SCNC: 11 MMOL/L (ref 4–13)
BASOPHILS # BLD AUTO: 0.09 THOUSANDS/ΜL (ref 0–0.1)
BASOPHILS NFR BLD AUTO: 2 % (ref 0–1)
BUN SERPL-MCNC: 18 MG/DL (ref 5–25)
CALCIUM SERPL-MCNC: 9.1 MG/DL (ref 8.3–10.1)
CHLORIDE SERPL-SCNC: 114 MMOL/L (ref 100–108)
CO2 SERPL-SCNC: 20 MMOL/L (ref 21–32)
CREAT SERPL-MCNC: 0.38 MG/DL (ref 0.6–1.3)
EOSINOPHIL # BLD AUTO: 0.17 THOUSAND/ΜL (ref 0–0.61)
EOSINOPHIL NFR BLD AUTO: 3 % (ref 0–6)
ERYTHROCYTE [DISTWIDTH] IN BLOOD BY AUTOMATED COUNT: 12.9 % (ref 11.6–15.1)
GFR SERPL CREATININE-BSD FRML MDRD: 133 ML/MIN/1.73SQ M
GLUCOSE SERPL-MCNC: 65 MG/DL (ref 65–140)
GLUCOSE SERPL-MCNC: 78 MG/DL (ref 65–140)
GLUCOSE SERPL-MCNC: 91 MG/DL (ref 65–140)
HCT VFR BLD AUTO: 42.9 % (ref 34.8–46.1)
HGB BLD-MCNC: 13.5 G/DL (ref 11.5–15.4)
IMM GRANULOCYTES # BLD AUTO: 0.02 THOUSAND/UL (ref 0–0.2)
IMM GRANULOCYTES NFR BLD AUTO: 0 % (ref 0–2)
LYMPHOCYTES # BLD AUTO: 2.39 THOUSANDS/ΜL (ref 0.6–4.47)
LYMPHOCYTES NFR BLD AUTO: 39 % (ref 14–44)
MAGNESIUM SERPL-MCNC: 2.3 MG/DL (ref 1.6–2.6)
MCH RBC QN AUTO: 31.8 PG (ref 26.8–34.3)
MCHC RBC AUTO-ENTMCNC: 31.5 G/DL (ref 31.4–37.4)
MCV RBC AUTO: 101 FL (ref 82–98)
MONOCYTES # BLD AUTO: 0.62 THOUSAND/ΜL (ref 0.17–1.22)
MONOCYTES NFR BLD AUTO: 10 % (ref 4–12)
MRSA NOSE QL CULT: ABNORMAL
MRSA NOSE QL CULT: ABNORMAL
NEUTROPHILS # BLD AUTO: 2.82 THOUSANDS/ΜL (ref 1.85–7.62)
NEUTS SEG NFR BLD AUTO: 46 % (ref 43–75)
NRBC BLD AUTO-RTO: 0 /100 WBCS
PLATELET # BLD AUTO: 204 THOUSANDS/UL (ref 149–390)
PMV BLD AUTO: 12.1 FL (ref 8.9–12.7)
POTASSIUM SERPL-SCNC: 3.6 MMOL/L (ref 3.5–5.3)
RBC # BLD AUTO: 4.24 MILLION/UL (ref 3.81–5.12)
SODIUM SERPL-SCNC: 145 MMOL/L (ref 136–145)
WBC # BLD AUTO: 6.11 THOUSAND/UL (ref 4.31–10.16)

## 2021-07-20 PROCEDURE — 80048 BASIC METABOLIC PNL TOTAL CA: CPT | Performed by: INTERNAL MEDICINE

## 2021-07-20 PROCEDURE — 82948 REAGENT STRIP/BLOOD GLUCOSE: CPT

## 2021-07-20 PROCEDURE — 85025 COMPLETE CBC W/AUTO DIFF WBC: CPT | Performed by: INTERNAL MEDICINE

## 2021-07-20 PROCEDURE — 99236 HOSP IP/OBS SAME DATE HI 85: CPT | Performed by: INTERNAL MEDICINE

## 2021-07-20 PROCEDURE — NC001 PR NO CHARGE: Performed by: INTERNAL MEDICINE

## 2021-07-20 PROCEDURE — 99222 1ST HOSP IP/OBS MODERATE 55: CPT | Performed by: INTERNAL MEDICINE

## 2021-07-20 PROCEDURE — 83735 ASSAY OF MAGNESIUM: CPT | Performed by: INTERNAL MEDICINE

## 2021-07-20 PROCEDURE — 99232 SBSQ HOSP IP/OBS MODERATE 35: CPT | Performed by: PHYSICIAN ASSISTANT

## 2021-07-20 RX ORDER — DIAZEPAM 5 MG/ML
1 INJECTION, SOLUTION INTRAMUSCULAR; INTRAVENOUS EVERY 6 HOURS
Status: CANCELLED | OUTPATIENT
Start: 2021-07-20

## 2021-07-20 RX ORDER — SODIUM CHLORIDE 9 MG/ML
100 INJECTION, SOLUTION INTRAVENOUS CONTINUOUS
Status: CANCELLED | OUTPATIENT
Start: 2021-07-20

## 2021-07-20 RX ORDER — SODIUM CHLORIDE 9 MG/ML
75 INJECTION, SOLUTION INTRAVENOUS CONTINUOUS
Status: DISCONTINUED | OUTPATIENT
Start: 2021-07-20 | End: 2021-07-20

## 2021-07-20 RX ORDER — SODIUM CHLORIDE 9 MG/ML
100 INJECTION, SOLUTION INTRAVENOUS CONTINUOUS
Status: DISCONTINUED | OUTPATIENT
Start: 2021-07-20 | End: 2021-07-20 | Stop reason: HOSPADM

## 2021-07-20 RX ORDER — ONDANSETRON 2 MG/ML
4 INJECTION INTRAMUSCULAR; INTRAVENOUS EVERY 6 HOURS PRN
Status: CANCELLED | OUTPATIENT
Start: 2021-07-20

## 2021-07-20 RX ORDER — DIAZEPAM 5 MG/ML
5 INJECTION, SOLUTION INTRAMUSCULAR; INTRAVENOUS ONCE AS NEEDED
Status: DISCONTINUED | OUTPATIENT
Start: 2021-07-20 | End: 2021-07-22 | Stop reason: HOSPADM

## 2021-07-20 RX ORDER — ONDANSETRON 2 MG/ML
4 INJECTION INTRAMUSCULAR; INTRAVENOUS EVERY 6 HOURS PRN
Status: DISCONTINUED | OUTPATIENT
Start: 2021-07-20 | End: 2021-07-22 | Stop reason: HOSPADM

## 2021-07-20 RX ORDER — DEXTROSE AND SODIUM CHLORIDE 5; .45 G/100ML; G/100ML
75 INJECTION, SOLUTION INTRAVENOUS CONTINUOUS
Status: DISCONTINUED | OUTPATIENT
Start: 2021-07-20 | End: 2021-07-22

## 2021-07-20 RX ORDER — DIAZEPAM 5 MG/ML
1 INJECTION, SOLUTION INTRAMUSCULAR; INTRAVENOUS EVERY 6 HOURS
Status: DISCONTINUED | OUTPATIENT
Start: 2021-07-20 | End: 2021-07-22 | Stop reason: HOSPADM

## 2021-07-20 RX ADMIN — SODIUM CHLORIDE 100 ML/HR: 0.9 INJECTION, SOLUTION INTRAVENOUS at 16:20

## 2021-07-20 RX ADMIN — DEXTROSE AND SODIUM CHLORIDE 75 ML/HR: 5; .45 INJECTION, SOLUTION INTRAVENOUS at 18:07

## 2021-07-20 RX ADMIN — SODIUM CHLORIDE 100 ML/HR: 0.9 INJECTION, SOLUTION INTRAVENOUS at 09:11

## 2021-07-20 RX ADMIN — DIAZEPAM 1 MG: 10 INJECTION, SOLUTION INTRAMUSCULAR; INTRAVENOUS at 06:21

## 2021-07-20 RX ADMIN — ENOXAPARIN SODIUM 40 MG: 40 INJECTION SUBCUTANEOUS at 08:43

## 2021-07-20 RX ADMIN — SODIUM CHLORIDE 100 MG: 9 INJECTION, SOLUTION INTRAVENOUS at 08:43

## 2021-07-20 RX ADMIN — DIAZEPAM 1 MG: 10 INJECTION, SOLUTION INTRAMUSCULAR; INTRAVENOUS at 00:04

## 2021-07-20 RX ADMIN — LEVETIRACETAM 1000 MG: 100 INJECTION, SOLUTION INTRAVENOUS at 06:21

## 2021-07-20 RX ADMIN — DIAZEPAM 1 MG: 10 INJECTION, SOLUTION INTRAMUSCULAR; INTRAVENOUS at 11:32

## 2021-07-20 RX ADMIN — DIAZEPAM 1 MG: 10 INJECTION, SOLUTION INTRAMUSCULAR; INTRAVENOUS at 18:06

## 2021-07-20 RX ADMIN — SODIUM CHLORIDE 100 MG: 9 INJECTION, SOLUTION INTRAVENOUS at 20:33

## 2021-07-20 RX ADMIN — LEVETIRACETAM 1000 MG: 100 INJECTION, SOLUTION INTRAVENOUS at 18:01

## 2021-07-20 NOTE — ASSESSMENT & PLAN NOTE
History of Lennox-Gastaut syndrome with intractable seizure history and progressive encephalopathy  Patient also has a remote history of anoxic brain injury  Prior EEG monitoring with reported frequent seizure activity despite multiple medication trials and vagus nerve stimulator placement  Follows with Neurology clinically  Plan as above

## 2021-07-20 NOTE — NURSING NOTE
Patient arrived to nursing unit via stretcher  No report called from sending facility  Patients vital signs remain stable at this time  Will continue to monitor

## 2021-07-20 NOTE — CASE MANAGEMENT
I faxed OBS status notice to Francis Wick at [employer]   I also notified Emile Zavaleta that patient is being transferred to [facility] to a higher level of care  Pt needs peg tube reinserted  I notified Vlad Okeefe at [facility] that patient is being transferred today to a higher level of care

## 2021-07-20 NOTE — PLAN OF CARE
Problem: Potential for Falls  Goal: Patient will remain free of falls  Description: INTERVENTIONS:  - Educate patient/family on patient safety including physical limitations  - Instruct patient to call for assistance with activity   - Consult OT/PT to assist with strengthening/mobility   - Keep Call bell within reach  - Keep bed low and locked with side rails adjusted as appropriate  - Keep care items and personal belongings within reach  - Initiate and maintain comfort rounds  - Make Fall Risk Sign visible to staff  - Offer Toileting every 1-2 Hours, in advance of need  - Initiate/Maintain bed and chair alarm  - Obtain necessary fall risk management equipment: bed and chair  - Apply yellow socks and bracelet for high fall risk patients  - Consider moving patient to room near nurses station  Outcome: Completed     Problem: MOBILITY - ADULT  Goal: Maintain or return to baseline ADL function  Description: INTERVENTIONS:  - Educate patient/family on patient safety including physical limitations  - Instruct patient to call for assistance with activity   - Consult OT/PT to assist with strengthening/mobility   - Keep Call bell within reach  - Keep bed low and locked with side rails adjusted as appropriate  - Keep care items and personal belongings within reach  - Initiate and maintain comfort rounds  - Make Fall Risk Sign visible to staff  - Offer Toileting every 1-2 Hours, in advance of need  - Initiate/Maintain bed and chair alarm  - Obtain necessary fall risk management equipment:bed and chair alarm  - Apply yellow socks and bracelet for high fall risk patients  - Consider moving patient to room near nurses station  Outcome: Completed  Goal: Maintains/Returns to pre admission functional level  Description: INTERVENTIONS:  - Perform BMAT or MOVE assessment daily    - Set and communicate daily mobility goal to care team and patient/family/caregiver     - Collaborate with rehabilitation services on mobility goals if consulted  - Perform Range of Motion times a day  - Reposition patient every  hours  - Dangle patient times a day  - Stand patient  times a day  - Ambulate patient  times a day  - Out of bed to chair times a day   - Out of bed for meals  times a day  - Out of bed for toileting  - Record patient progress and toleration of activity level   Outcome: Completed     Problem: NEUROSENSORY - ADULT  Goal: Achieves stable or improved neurological status  Description: INTERVENTIONS  - Monitor and report changes in neurological status  - Monitor vital signs such as temperature, blood pressure, glucose, and any other labs ordered   - Initiate measures to prevent increased intracranial pressure  - Monitor for seizure activity and implement precautions if appropriate      Outcome: Completed  Goal: Remains free of injury related to seizures activity  Description: INTERVENTIONS  - Maintain airway, patient safety  and administer oxygen as ordered  - Monitor patient for seizure activity, document and report duration and description of seizure to physician/advanced practitioner  - If seizure occurs,  ensure patient safety during seizure  - Reorient patient post seizure  - Seizure pads on all 4 side rails  - Instruct patient/family to notify RN of any seizure activity including if an aura is experienced  - Instruct patient/family to call for assistance with activity based on nursing assessment  - Administer anti-seizure medications if ordered    Outcome: Completed  Goal: Achieves maximal functionality and self care  Description: INTERVENTIONS  - Monitor swallowing and airway patency with patient fatigue and changes in neurological status  - Encourage and assist patient to increase activity and self care     - Encourage visually impaired, hearing impaired and aphasic patients to use assistive/communication devices  Outcome: Completed     Problem: GASTROINTESTINAL - ADULT  Goal: Minimal or absence of nausea and/or vomiting  Description: INTERVENTIONS:  - Administer IV fluids if ordered to ensure adequate hydration  - Maintain NPO status until nausea and vomiting are resolved  - Nasogastric tube if ordered  - Administer ordered antiemetic medications as needed  - Provide nonpharmacologic comfort measures as appropriate  - Advance diet as tolerated, if ordered  - Consider nutrition services referral to assist patient with adequate nutrition and appropriate food choices  Outcome: Completed  Goal: Maintains adequate nutritional intake  Description: INTERVENTIONS:  - Monitor percentage of each meal consumed  - Identify factors contributing to decreased intake, treat as appropriate  - Assist with meals as needed  - Monitor I&O, weight, and lab values if indicated  - Obtain nutrition services referral as needed  Outcome: Completed  Goal: Oral mucous membranes remain intact  Description: INTERVENTIONS  - Assess oral mucosa and hygiene practices  - Implement preventative oral hygiene regimen  - Implement oral medicated treatments as ordered  - Initiate Nutrition services referral as needed  Outcome: Completed     Problem: SKIN/TISSUE INTEGRITY - ADULT  Goal: Skin Integrity remains intact(Skin Breakdown Prevention)  Description: Assess:  -Perform Tyson assessment every 8 hours  -Clean and moisturize skin every 8hours  -Inspect skin when repositioning, toileting, and assisting with ADLS  -Assess under medical devices every 4 hours   -Assess extremities for adequate circulation and sensation     Bed Management:  -Have minimal linens on bed & keep smooth, unwrinkled  -Change linens as needed when moist or perspiring  -Avoid sitting or lying in one position for more than 1-2 hours while in bed  -Keep HOB at  30 degrees     Toileting:  -Offer bedside commode  -Assess for incontinence every 1-2 hours  -Use incontinent care products after each incontinent episode    Activity:  -Mobilize patient 3-4 times a day  -Encourage activity and walks on unit  -Encourage or provide ROM exercises   -Turn and reposition patient every 2 Hours  -Use appropriate equipment to lift or move patient in bed  -Instruct/ Assist with weight shifting every 1-2 when out of bed in chair  -Consider limitation of chair time 3-4 hour intervals    Skin Care:  -Avoid use of baby powder, tape, friction and shearing, hot water or constrictive clothing  -Do not massage red bony areas  Outcome: Completed     Problem: PAIN - ADULT  Goal: Verbalizes/displays adequate comfort level or baseline comfort level  Description: Interventions:  - Encourage patient to monitor pain and request assistance  - Assess pain using appropriate pain scale  - Administer analgesics based on type and severity of pain and evaluate response  - Implement non-pharmacological measures as appropriate and evaluate response  - Consider cultural and social influences on pain and pain management  - Notify physician/advanced practitioner if interventions unsuccessful or patient reports new pain  Outcome: Completed     Problem: INFECTION - ADULT  Goal: Absence or prevention of progression during hospitalization  Description: INTERVENTIONS:  - Assess and monitor for signs and symptoms of infection  - Monitor lab/diagnostic results  - Monitor all insertion sites, i e  indwelling lines, tubes, and drains  - Monitor endotracheal if appropriate and nasal secretions for changes in amount and color  - [place] appropriate cooling/warming therapies per order  - Administer medications as ordered  - Instruct and encourage patient and family to use good hand hygiene technique  - Identify and instruct in appropriate isolation precautions for identified infection/condition  Outcome: Completed     Problem: SAFETY ADULT  Goal: Patient will remain free of falls  Description: INTERVENTIONS:  - Educate patient/family on patient safety including physical limitations  - Instruct patient to call for assistance with activity   - Consult OT/PT to assist with strengthening/mobility   - Keep Call bell within reach  - Keep bed low and locked with side rails adjusted as appropriate  - Keep care items and personal belongings within reach  - Initiate and maintain comfort rounds  - Make Fall Risk Sign visible to staff  - Offer Toileting every 1-2 Hours, in advance of need  - Initiate/Maintain bed and chair alarm  - Obtain necessary fall risk management equipment: bed and chair  - Apply yellow socks and bracelet for high fall risk patients  - Consider moving patient to room near nurses station  Outcome: Completed  Goal: Maintain or return to baseline ADL function  Description: INTERVENTIONS:  - Educate patient/family on patient safety including physical limitations  - Instruct patient to call for assistance with activity   - Consult OT/PT to assist with strengthening/mobility   - Keep Call bell within reach  - Keep bed low and locked with side rails adjusted as appropriate  - Keep care items and personal belongings within reach  - Initiate and maintain comfort rounds  - Make Fall Risk Sign visible to staff  - Offer Toileting every 1-2 Hours, in advance of need  - Initiate/Maintain bed and chair alarm  - Obtain necessary fall risk management equipment:bed and chair alarm  - Apply yellow socks and bracelet for high fall risk patients  - Consider moving patient to room near nurses station  Outcome: Completed  Goal: Maintains/Returns to pre admission functional level  Description: INTERVENTIONS:  - Perform BMAT or MOVE assessment daily    - Set and communicate daily mobility goal to care team and patient/family/caregiver  - Collaborate with rehabilitation services on mobility goals if consulted  - Perform Range of Motion  times a day  - Reposition patient every  hours    - Dangle patient  times a day  - Stand patient  times a day  - Ambulate patient times a day  - Out of bed to chair  times a day   - Out of bed for meals  times a day  - Out of bed for toileting  - Record patient progress and toleration of activity level   Outcome: Completed     Problem: DISCHARGE PLANNING  Goal: Discharge to home or other facility with appropriate resources  Description: INTERVENTIONS:  - Identify barriers to discharge w/patient and caregiver  - Arrange for needed discharge resources and transportation as appropriate  - Identify discharge learning needs (meds, wound care, etc )  - Arrange for interpretive services to assist at discharge as needed  - Refer to Case Management Department for coordinating discharge planning if the patient needs post-hospital services based on physician/advanced practitioner order or complex needs related to functional status, cognitive ability, or social support system  Outcome: Completed     Problem: Knowledge Deficit  Goal: Patient/family/caregiver demonstrates understanding of disease process, treatment plan, medications, and discharge instructions  Description: Complete learning assessment and assess knowledge base    Interventions:  - Provide teaching at level of understanding  - Provide teaching via preferred learning methods  Outcome: Completed     Problem: Prexisting or High Potential for Compromised Skin Integrity  Goal: Skin integrity is maintained or improved  Description: INTERVENTIONS:  - Identify patients at risk for skin breakdown  - Assess and monitor skin integrity  - Assess and monitor nutrition and hydration status  - Monitor labs   - Assess for incontinence   - Turn and reposition patient  - Assist with mobility/ambulation  - Relieve pressure over bony prominences  - Avoid friction and shearing  - Provide appropriate hygiene as needed including keeping skin clean and dry  - Evaluate need for skin moisturizer/barrier cream  - Collaborate with interdisciplinary team   - Patient/family teaching  - Consider wound care consult   Outcome: Completed     Problem: Nutrition/Hydration-ADULT  Goal: Nutrient/Hydration intake appropriate for improving, restoring or maintaining nutritional needs  Description: Monitor and assess patient's nutrition/hydration status for malnutrition  Collaborate with interdisciplinary team and initiate plan and interventions as ordered  Monitor patient's weight and dietary intake as ordered or per policy  Utilize nutrition screening tool and intervene as necessary  Determine patient's food preferences and provide high-protein, high-caloric foods as appropriate       INTERVENTIONS:  - Monitor oral intake, urinary output, labs, and treatment plans  - Assess nutrition and hydration status and recommend course of action  - Evaluate amount of meals eaten  - Assist patient with eating if necessary   - Allow adequate time for meals  - Recommend/ encourage appropriate diets, oral nutritional supplements, and vitamin/mineral supplements  - Order, calculate, and assess calorie counts as needed  - Recommend, monitor, and adjust tube feedings and TPN/PPN based on assessed needs  - Assess need for intravenous fluids  - Provide specific nutrition/hydration education as appropriate  - Include patient/family/caregiver in decisions related to nutrition  Outcome: Completed

## 2021-07-20 NOTE — PROGRESS NOTES
Progress Note - General Surgery   Lyle Rice 37 y o  female MRN: [id number]  Unit/Bed#: 167-61 Encounter: [id number]    Assessment:  PEG tube dislodged  - unable to replace PEG tube at bedside  - GI unable to reinsert PEG tube today in the OR because patient had received Lovenox this morning    Lennox-Gastaut Syndrome   History of anoxic brain injury  Severe protein calorie malnutrition    Plan:  - [facility] team  This will hopefully expedite reinsertion of PEG tube given that she is dependent on tube for nutrition and the lack of IV availability for anti-seizure medication  - continue NPO  - seizure precautions  - spoke with gastroenterology WAG[facility] and [facility] attending physician regarding patient's condition and plan for PEG reinsertion  Discussed with Dr Ellen Cha, general surgeon on-call for this hospital today  She was available through [facility] and telephone contact is needed  Subjective/Objective   Subjective: Patient is nonverbal   Does not appear to be in any pain  Objective: Blood pressure (!) 83/53, pulse 78, temperature (!) 97 4 °F (36 3 °C), temperature source Tympanic, resp  rate 16, height 5' 3" (1 6 m), weight 43 8 kg (96 lb 9 oz), SpO2 94 %  ,Body mass index is 17 11 kg/m²        Intake/Output Summary (Last 24 hours) at 7/20/2021 1154  Last data filed at 7/20/2021 1528  Gross per 24 hour   Intake 0 ml   Output --   Net 0 ml       Invasive Devices     Peripheral Intravenous Line            Peripheral IV 07/18/21 Right;Dorsal (posterior) Hand 1 day          Drain            Gastrostomy/Enterostomy Gastrostomy 14 Fr   days                Physical Exam: BP (!) 83/53   Pulse 78   Temp (!) 97 4 °F (36 3 °C) (Tympanic)   Resp 16   Ht 5' 3" (1 6 m)   Wt 43 8 kg (96 lb 9 oz)   SpO2 94%   BMI 17 11 kg/m²   General appearance: appears stated age, uncooperative and no distress, when I examined the patient she was laying on her right side  in a fetal position  Head: Normocephalic, without obvious abnormality, atraumatic  Lungs: clear to auscultation bilaterally, no rales, rhonchi, wheeze  Heart: regular rate and rhythm, S1, S2 normal, no murmur, click, rub or gallop  Abdomen:  Scaphoid, soft, non-tender; bowel sounds normal; no masses,  no organomegaly and PEG tube dislodged, no drainage or bleeding in this area  Lab, Imaging and other studies:  I have personally reviewed pertinent lab results    , CBC:   Lab Results   Component Value Date    WBC 6 11 07/20/2021    HGB 13 5 07/20/2021    HCT 42 9 07/20/2021     (H) 07/20/2021     07/20/2021    MCH 31 8 07/20/2021    MCHC 31 5 07/20/2021    RDW 12 9 07/20/2021    MPV 12 1 07/20/2021    NRBC 0 07/20/2021   , CMP:   Lab Results   Component Value Date    SODIUM 145 07/20/2021    K 3 6 07/20/2021     (H) 07/20/2021    CO2 20 (L) 07/20/2021    BUN 18 07/20/2021    CREATININE 0 38 (L) 07/20/2021    CALCIUM 9 1 07/20/2021    EGFR 133 07/20/2021     VTE Pharmacologic Prophylaxis: Enoxaparin (Lovenox)  VTE Mechanical Prophylaxis: sequential compression device      Jean-Pierre William PA-C  07/20/21

## 2021-07-20 NOTE — ASSESSMENT & PLAN NOTE
Presented to the ED for replacement of PEG tube - unfortunately attempts to replace in the ER unsuccessful  Patient was at admitted with general surgery consult, also unable to replace tube bedside  Was tentatively scheduled for GI intervention today via EGD to better visualize the gastrostomy tract which may have closed per surgery - canceled as patient received chemical DVT prophylaxis this morning  Patient has a history of seizure disorder, on 6 standing and 1 prn anti epileptic medication  She has also had a history of multiple prior hospitalizations secondary to status epilepticus, and remains at high risk for seizure activity  Despite change over to IV AEDs, the patient remains only partially covered for her underlying seizure disorder  Discussed with GI at length - she remains at high risk for procedure today given the possibility that her gastrostomy tract may be closed - however, given her high risk for seizure activity, and lack of full time availability of IR Neurology, recommendation was still made for transfer  Patient was accepted by Dr Brayan Feliciano of [facility] in transfer to [address] [place] [address]

## 2021-07-20 NOTE — DISCHARGE SUMMARY
[facility] [address] [place]  Discharge- Karyn Doherty [facility] [birth year][address] 1980, 37 y o  female MRN: [id number]  Unit/Bed#: 435-65 Encounter: [id number]  Primary Care Provider: Annita Keating DO   Date and time admitted to hospital: 7/18/2021  7:34 PM    * Dislodged gastrostomy tube  Assessment & Plan  Presented to the ED for replacement of PEG tube - unfortunately attempts to replace in the ER unsuccessful  Patient was at admitted with general surgery consult, also unable to replace tube bedside  Was tentatively scheduled for GI intervention today via EGD to better visualize the gastrostomy tract which may have closed per surgery - canceled as patient received chemical DVT prophylaxis this morning  Patient has a history of seizure disorder, on 6 standing and 1 prn anti epileptic medication  She has also had a history of multiple prior hospitalizations secondary to status epilepticus, and remains at high risk for seizure activity  Despite change over to IV AEDs, the patient remains only partially covered for her underlying seizure disorder  Discussed with GI at length - she remains at high risk for procedure today given the possibility that her gastrostomy tract may be closed - however, given her high risk for seizure activity, and lack of full time availability of IR Neurology, recommendation was still made for transfer  Patient was accepted by Dr Luzma Pierson of [facility] in transfer to [facility]  Intractable epilepsy with status epilepticus [facility])  Assessment & Plan  History of intractable epilepsy with status epilepticus, chronically on AEDs with combination of Onfi, Briviact, Cannabidiol, Vimpat, Banzel, and Topamax - all by PEG tube  She is also on diazepam as needed for break through seizures      Given lack of access to oral medications, and following discussion with Neurology last evening, patient was changed to IV Vimpat, IV Keppra, and low-dose standing IV diazepam   Plan is to reestablish access to oral medications as above, and reinitiate home regimen  No seizure activity thus far  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[facility] [address] )  Assessment & Plan  History of Lennox-Gastaut syndrome with intractable seizure history and progressive encephalopathy  Patient also has a remote history of anoxic brain injury  Prior EEG monitoring with reported frequent seizure activity despite multiple medication trials and vagus nerve stimulator placement  Follows with Neurology clinically  Plan as above  Cerebral anoxic injury [facility])  Assessment & Plan  As above  Discharging Physician / Practitioner: Jody Haywood MD  PCP: Sola Mills DO  Admission Date:   Admission Orders (From admission, onward)     Ordered        07/19/21 1630  Inpatient Admission  Once         07/19/21 0003  Place in Observation  Once                   Discharge Date: 07/20/21    Medical Problems     Resolved Problems  Date Reviewed: 7/20/2021    None                Consultations During Hospital Stay:  · Surgery  · GI    Procedures Performed:   · None    Significant Findings / Test Results:   No results found  Incidental Findings:   · None     Test Results Pending at Discharge (will require follow up): · None      Complications:  None    Reason for Admission: PEG Tube replacement  HPI from original H&P dated 07/18/2021:     Emilia Alfredo is a 37 y o  female who presents to the ER for replacement of PEG tube which was dislodged at the SNF  Patient had several visits to the ER for dislodged PEG tube  Patient is at baseline mental status  She is none verbal  Labs completed in the Er with results as shown below  ER Attending had attempts to replace tube without success  Case discussed with Dr Sharon Ramos (General surgery) General surgery Team Will evaluated patient in the AM  At bedside patient is awake, at baseline mental status, does not appear to be in any acute distress   Patient is being admitted on Observation status med surg level care for further management of Dislodged PEG tube  Please see above list of diagnoses and related plan for additional information  Condition at Discharge: stable     Discharge Day Visit / Exam:     * Please refer to separate progress note for these details *    Discharge instructions/Information to patient and family:   See after visit summary for information provided to patient and family  Provisions for Follow-Up Care:  See after visit summary for information related to follow-up care and any pertinent home health orders  Disposition:     [address] Transfer to [facility]    For Discharges to [facility]:   · Not Applicable to this Patient - Not Applicable to this Patient    Planned Readmission: Yes     Discharge Statement:  I spent 60 minutes discharging the patient  This time was spent on the day of discharge  I had direct contact with the patient on the day of discharge  Greater than 50% of the total time was spent examining patient, answering all patient questions, arranging and discussing plan of care with patient as well as directly providing post-discharge instructions  Additional time then spent on discharge activities  Discharge Medications:  See after visit summary for reconciled discharge medications provided to patient and family        ** Please Note: This note has been constructed using a voice recognition system **

## 2021-07-20 NOTE — ASSESSMENT & PLAN NOTE
Patient has a history of epilepsy with status epilepticus  She is on Onfi, Briviact, Cannabidiol, Vimpat, banzel and Topamax, all through PEG tube  Valium p r n  For breakthrough seizures  Previous attending discussed with Neurology    Current regiment IV Vimpat 100 mg b i d , loaded with Keppra 2000 mg, continue 1000 mg q 12 hours, Valium 1 mg q 6 hours  Plantar restart home AED S soon as possible after PEG tube placement and clearance by G I  No seizure-like activity so far  Seizure precautions

## 2021-07-20 NOTE — ASSESSMENT & PLAN NOTE
Patient has a history of Lennox-Gastaut syndrome with seizures and intellectual disability  She has a remote history of anoxic brain injury    Prior EEG monitoring reported frequent seizure activity despite multiple medication trials and vagus nerve stimulator placement  Follows neurology

## 2021-07-20 NOTE — PLAN OF CARE
Problem: Prexisting or High Potential for Compromised Skin Integrity  Goal: Skin integrity is maintained or improved  Description: INTERVENTIONS:  - Identify patients at risk for skin breakdown  - Assess and monitor skin integrity  - Assess and monitor nutrition and hydration status  - Monitor labs   - Assess for incontinence   - Turn and reposition patient  - Assist with mobility/ambulation  - Relieve pressure over bony prominences  - Avoid friction and shearing  - Provide appropriate hygiene as needed including keeping skin clean and dry  - Evaluate need for skin moisturizer/barrier cream  - Collaborate with interdisciplinary team   - Patient/family teaching  - Consider wound care consult   Outcome: Progressing     Problem: MOBILITY - ADULT  Goal: Maintain or return to baseline ADL function  Description: INTERVENTIONS:  -  Assess patient's ability to carry out ADLs; assess patient's baseline for ADL function and identify physical deficits which impact ability to perform ADLs (bathing, care of mouth/teeth, toileting, grooming, dressing, etc )  - Assess/evaluate cause of self-care deficits   - Assess range of motion  - Assess patient's mobility; develop plan if impaired  - Assess patient's need for assistive devices and provide as appropriate  - Encourage maximum independence but intervene and supervise when necessary  - Involve family in performance of ADLs  - Assess for home care needs following discharge   - Consider OT consult to assist with ADL evaluation and planning for discharge  - Provide patient education as appropriate  Outcome: Progressing  Goal: Maintains/Returns to pre admission functional level  Description: INTERVENTIONS:  - Perform BMAT or MOVE assessment daily    - Set and communicate daily mobility goal to care team and patient/family/caregiver  - Collaborate with rehabilitation services on mobility goals if consulted  - Perform Range of Motion 3times a day    - Reposition patient every 3 hours   - Dangle patient 3 times a day  - Stand patient 3 times a day  - Ambulate patient 3 times a day  - Out of bed to chair 3 times a day   - Out of bed for meals 3 times a day  - Out of bed for toileting  - Record patient progress and toleration of activity level   Outcome: Progressing     Problem: Potential for Falls  Goal: Patient will remain free of falls  Description: INTERVENTIONS:  - Educate patient/family on patient safety including physical limitations  - Instruct patient to call for assistance with activity   - Consult OT/PT to assist with strengthening/mobility   - Keep Call bell within reach  - Keep bed low and locked with side rails adjusted as appropriate  - Keep care items and personal belongings within reach  - Initiate and maintain comfort rounds  - Make Fall Risk Sign visible to staff  - Offer Toileting every 3 Hours, in advance of need  - Initiate/Maintain bed alarm  - Obtain necessary fall risk management equipment:   - Apply yellow socks and bracelet for high fall risk patients  - Consider moving patient to room near nurses station  Outcome: Progressing     Problem: METABOLIC, FLUID AND ELECTROLYTES - ADULT  Goal: Electrolytes maintained within normal limits  Description: INTERVENTIONS:  - Monitor labs and assess patient for signs and symptoms of electrolyte imbalances  - Administer electrolyte replacement as ordered  - Monitor response to electrolyte replacements, including repeat lab results as appropriate  - Instruct patient on fluid and nutrition as appropriate  Outcome: Progressing     Problem: SKIN/TISSUE INTEGRITY - ADULT  Goal: Skin Integrity remains intact(Skin Breakdown Prevention)  Description: Assess:  -Perform Tyson assessment every shift  -Clean and moisturize skin every day  -Inspect skin when repositioning, toileting, and assisting with ADLS    -Assess extremities for adequate circulation and sensation     Bed Management:  -Have minimal linens on bed & keep smooth, unwrinkled  -Change linens as needed when moist or perspiring  -Avoid sitting or lying in one position for more than 2 hours while in bed  -Keep HOB at 45degrees     Toileting:  -Offer bedside commode  -Assess for incontinence every 2hrs      Activity:    -Encourage activity and walks on unit  -Encourage or provide ROM exercises   -Turn and reposition patient every 2 Hours  -Use appropriate equipment to lift or move patient in bed  -Instruct/ Assist with weight shifting every 2hours  when out of bed in chair      Skin Care:  -Avoid use of baby powder, tape, friction and shearing, hot water or constrictive clothing  -Do not massage red bony areas    Next Steps:  -Teach patient strategies to minimize risks such as    -Consider consults to  interdisciplinary teams such as   Outcome: Progressing     Problem: Nutrition/Hydration-ADULT  Goal: Nutrient/Hydration intake appropriate for improving, restoring or maintaining nutritional needs  Description: Monitor and assess patient's nutrition/hydration status for malnutrition  Collaborate with interdisciplinary team and initiate plan and interventions as ordered  Monitor patient's weight and dietary intake as ordered or per policy  Utilize nutrition screening tool and intervene as necessary  Determine patient's food preferences and provide high-protein, high-caloric foods as appropriate       INTERVENTIONS:  - Monitor oral intake, urinary output, labs, and treatment plans  - Assess nutrition and hydration status and recommend course of action  - Evaluate amount of meals eaten  - Assist patient with eating if necessary   - Allow adequate time for meals  - Recommend/ encourage appropriate diets, oral nutritional supplements, and vitamin/mineral supplements  - Order, calculate, and assess calorie counts as needed  - Recommend, monitor, and adjust tube feedings and TPN/PPN based on assessed needs  - Assess need for intravenous fluids  - Provide specific nutrition/hydration education as appropriate  - Include patient/family/caregiver in decisions related to nutrition  Outcome: Progressing

## 2021-07-20 NOTE — ASSESSMENT & PLAN NOTE
Dislodged PEG tube, unsuccessful atemp to replace it in the ED  Patient was admitted [address] [facility], [place] for general surgery consult  Surgery unable to replace tube at bedside  Evaluated by GI, plan was EGD but it was canceled since patient received DVT prophylaxis    Due to concerns for seizures patient was transferred to [facility]   GI eval in the morning  Hold DVT prophylaxis  IVF

## 2021-07-20 NOTE — MALNUTRITION/BMI
This medical record reflects one or more clinical indicators suggestive of malnutrition  Malnutrition Findings:   Adult Malnutrition type: Chronic illness  Adult Degree of Malnutrition: Malnutrition of moderate degree  Malnutrition Characteristics: Fat loss, Muscle loss (Moderate PCM r/t muscle and fat wasting with low BMI; receives 100% nutrient needs via peg tube )    BMI Findings:  Adult BMI Classifications: Underweight < 18 5     Body mass index is 17 11 kg/m²  See Nutrition note dated 7/20/2021 for additional details  Completed nutrition assessment is viewable in the nutrition documentation

## 2021-07-20 NOTE — CONSULTS
Consultation - [address] [facility]  Ivonne Essex [facility] COTTAGE 37 y o  female MRN: [id number]  Unit/Bed#: 167-82 Encounter: [id number]        Derek NOGUERA    Reason for Consult / Principal Problem:     1  PEG tube malfunction       ASSESSMENT AND PLAN:      37year old nonverbal female with a PMH of seizure disorder, intellectual disability, ADHD, autistic disorder, severe protein calorie malnutrition and dysphagia requiring longstanding gastrostomy tube feedings who presents with a dislodged gastrostomy tube  1  Dysphagia requiring long term PEG tube   The patient presented with a dislodged PEG tube  General surgery attempted reinsertion at bedside without success  She relies of her PEG tube for all oral intake  Only 3 of her 7 anti seizure medications are able to be given via injection  - continue NPO status and IV medications   - after discussion with attending, given the patient received Lovenox this morning GI will need to defer PEG tube reinsertion until tomorrow  - SLIM aware and considering transfer to expedite reinsertion given lack of IV availability for anti seizure drugs     ______________________________________________________________________    HPI: 37year old nonverbal female with a PMH of seizure disorder, intellectual disability, ADHD, autistic disorder, severe protein calorie malnutrition and dysphagia requiring longstanding gastrostomy tube feedings who presents with a dislodged gastrostomy tube  Replacement was attempted at bedside by general surgery without success so GI has been consulted for replacement under MAC anesthesia  The patient has had a PEG tube in place since at least 2017, last replaced in ER 5/31/21  She relies of her PEG tube for all oral intake  As she is nonverbal all information has been obtained from EHR        REVIEW OF SYSTEMS:    Unable to be obtained       Historical Information   Past Medical History:   Diagnosis Date    ADHD     Anoxic brain damage ([facility] [address] )     Autistic disorder     Breakthrough seizure [facility] [address] ) 8/1/2019    Dysphagia     Dysphagia, oropharyngeal phase     Gastrostomy tube dependent (HCC)     14 Fr as of 05/19/2020    Hyperammonemia (HCC)     Hyperkeratosis     Hypotension     Intellectual disability     Lennox-Gastaut syndrome with tonic seizures (HCC)     Lethargy     Liver enzyme elevation     Onychomycosis     Osteoporosis     Osteoporosis      Past Surgical History:   Procedure Laterality Date    ABDOMINAL SURGERY      CARDIAC PACEMAKER PLACEMENT      vns implant l chest    IR GASTROSTOMY TUBE PLACEMENT  11/21/2019    IR THORACENTESIS  12/17/2018    JEJUNOSTOMY FEEDING TUBE      history of - most recently, PEG tube    PEG TUBE PLACEMENT      IN IMP STIM,CRANIAL,SUBQ,1 ARRAY Left 12/18/2019    Procedure: REPLACEMENT IMPLANTABLE PULSE GENERATOR FOR VAGAL NERVE STIMULATOR, LEFT CHEST;  Surgeon: Wes Woodson MD;  Location: [facility];  Service: Neurosurgery     Social History   Social History     Substance and Sexual Activity   Alcohol Use Not Currently     Social History     Substance and Sexual Activity   Drug Use No     Social History     Tobacco Use   Smoking Status Never Smoker   Smokeless Tobacco Never Used     History reviewed  No pertinent family history      Meds/Allergies     Medications Prior to Admission   Medication    cloBAZam (ONFI) 10 MG tablet    melatonin 3 mg    acetaminophen (TYLENOL) 325 mg tablet    bisacodyl (BISCOLAX) 10 mg suppository    bisacodyl (FLEET BISACODYL) 10 MG/30ML ENEM    Brivaracetam (Briviact) 10 MG/ML SOLN oral solution    cannabidiol (Epidiolex) 100 mg/ml SOLN    diazepam (VALIUM) 5 MG/ML solution    lacosamide (Vimpat) 10 mg/mL    magnesium hydroxide (MILK OF MAGNESIA) 400 mg/5 mL oral suspension    Multiple Vitamins-Minerals (MULTIVITAMIN WITH IRON-MINERALS) liquid    Probiotic Product (ALEXANDER-BID PROBIOTIC PO)    rufinamide (BANZEL) 400 mg tablet    topiramate (TOPAMAX) 50 MG tablet Current Facility-Administered Medications   Medication Dose Route Frequency    diazepam (VALIUM) injection 1 mg  1 mg Intravenous Q6H    enoxaparin (LOVENOX) subcutaneous injection 40 mg  40 mg Subcutaneous Daily    lacosamide (VIMPAT) 100 mg in sodium chloride 0 9 % 100 mL IVPB  100 mg Intravenous Q12H    levETIRAcetam (KEPPRA) 1,000 mg in sodium chloride 0 9 % 100 mL IVPB  1,000 mg Intravenous Q12H [facility]    ondansetron (ZOFRAN) injection 4 mg  4 mg Intravenous Q6H PRN    sodium chloride 0 9 % infusion  100 mL/hr Intravenous Continuous       Allergies   Allergen Reactions    Felbamate            Objective     Blood pressure (!) 83/53, pulse 78, temperature (!) 97 4 °F (36 3 °C), temperature source Tympanic, resp  rate 16, height 5' 3" (1 6 m), weight 43 8 kg (96 lb 9 oz), SpO2 94 %  Body mass index is 17 11 kg/m²  Intake/Output Summary (Last 24 hours) at 7/20/2021 9523  Last data filed at 7/20/2021 1072  Gross per 24 hour   Intake 0 ml   Output --   Net 0 ml         PHYSICAL EXAM:      General Appearance:   Alert, cooperative, no distress   HEENT:   Normocephalic, atraumatic, anicteric      Neck:  Supple, symmetrical, trachea midline   Lungs:   Clear to auscultation bilaterally; no rales, rhonchi or wheezing; respirations unlabored    Heart:   Regular rate and rhythm; no murmur, rub, or gallop  Abdomen:   Soft, non-tender, non-distended; normal bowel sounds; no masses, no organomegaly  PEG tube insertion site in LUQ, PEG tube not in place     Genitalia:   Deferred    Rectal:   Deferred    Extremities:  No cyanosis, clubbing or edema    Pulses:  2+ and symmetric all extremities    Skin:  No jaundice, rashes, or lesions    Lymph nodes:  No palpable cervical lymphadenopathy        Lab Results:   Admission on 07/18/2021   Component Date Value    WBC 07/18/2021 7 71     RBC 07/18/2021 4 14     Hemoglobin 07/18/2021 13 4     Hematocrit 07/18/2021 42 6     MCV 07/18/2021 103*    MCH 07/18/2021 32 4  MCHC 07/18/2021 31 5     RDW 07/18/2021 13 2     MPV 07/18/2021 11 7     Platelets 67/28/2868 184     nRBC 07/18/2021 0     Neutrophils Relative 07/18/2021 54     Immat GRANS % 07/18/2021 0     Lymphocytes Relative 07/18/2021 34     Monocytes Relative 07/18/2021 9     Eosinophils Relative 07/18/2021 2     Basophils Relative 07/18/2021 1     Neutrophils Absolute 07/18/2021 4 17     Immature Grans Absolute 07/18/2021 0 02     Lymphocytes Absolute 07/18/2021 2 62     Monocytes Absolute 07/18/2021 0 68     Eosinophils Absolute 07/18/2021 0 16     Basophils Absolute 07/18/2021 0 06     Sodium 07/18/2021 143     Potassium 07/18/2021 4 4     Chloride 07/18/2021 108     CO2 07/18/2021 24     ANION GAP 07/18/2021 11     BUN 07/18/2021 20     Creatinine 07/18/2021 0 50*    Glucose 07/18/2021 96     Calcium 07/18/2021 9 4     eGFR 07/18/2021 121     MRSA Culture Only 07/19/2021 Methicillin Resistant Staphylococcus aureus isolated*    MRSA Culture Only 07/19/2021 This patient requires contact isolation precautions per [place] law  Contact precautions are not required in [place] for nasal surveillance cultures       Sodium 07/19/2021 143     Potassium 07/19/2021 3 6     Chloride 07/19/2021 111*    CO2 07/19/2021 23     ANION GAP 07/19/2021 9     BUN 07/19/2021 21     Creatinine 07/19/2021 0 54*    Glucose 07/19/2021 104     Glucose, Fasting 07/19/2021 104*    Calcium 07/19/2021 9 6     eGFR 07/19/2021 118     WBC 07/19/2021 6 97     RBC 07/19/2021 4 35     Hemoglobin 07/19/2021 13 8     Hematocrit 07/19/2021 43 8     MCV 07/19/2021 101*    MCH 07/19/2021 31 7     MCHC 07/19/2021 31 5     RDW 07/19/2021 13 1     Platelets 66/66/0868 204     MPV 07/19/2021 11 4     WBC 07/20/2021 6 11     RBC 07/20/2021 4 24     Hemoglobin 07/20/2021 13 5     Hematocrit 07/20/2021 42 9     MCV 07/20/2021 101*    MCH 07/20/2021 31 8     MCHC 07/20/2021 31 5     RDW 07/20/2021 12 9  MPV 07/20/2021 12 1     Platelets 99/09/2118 204     nRBC 07/20/2021 0     Neutrophils Relative 07/20/2021 46     Immat GRANS % 07/20/2021 0     Lymphocytes Relative 07/20/2021 39     Monocytes Relative 07/20/2021 10     Eosinophils Relative 07/20/2021 3     Basophils Relative 07/20/2021 2*    Neutrophils Absolute 07/20/2021 2 82     Immature Grans Absolute 07/20/2021 0 02     Lymphocytes Absolute 07/20/2021 2 39     Monocytes Absolute 07/20/2021 0 62     Eosinophils Absolute 07/20/2021 0 17     Basophils Absolute 07/20/2021 0 09     Sodium 07/20/2021 145     Potassium 07/20/2021 3 6     Chloride 07/20/2021 114*    CO2 07/20/2021 20*    ANION GAP 07/20/2021 11     BUN 07/20/2021 18     Creatinine 07/20/2021 0 38*    Glucose 07/20/2021 78     Calcium 07/20/2021 9 1     eGFR 07/20/2021 133     Magnesium 07/20/2021 2 3        Imaging Studies: I have personally reviewed pertinent imaging studies

## 2021-07-20 NOTE — ASSESSMENT & PLAN NOTE
History of intractable epilepsy with status epilepticus, chronically on AEDs with combination of Onfi, Briviact, Cannabidiol, Vimpat, Banzel, and Topamax - all by PEG tube  She is also on diazepam as needed for break through seizures  Given lack of access to oral medications, and following discussion with Neurology last evening, patient was changed to IV Vimpat, IV Keppra, and low-dose standing IV diazepam   Plan is to reestablish access to oral medications as above, and reinitiate home regimen  No seizure activity thus far

## 2021-07-20 NOTE — H&P
[facility] [address] 1980, 37 y o  female MRN: [id number]  Unit/Bed#: [address] 212-01 Encounter: [id number]  Primary Care Provider: Sierra Ross DO   Date and time admitted to hospital: 7/20/2021  4:06 PM    * Dislodged gastrostomy tube  Assessment & Plan  Dislodged PEG tube, unsuccessful atemp to replace it in the ED  Patient was admitted [facility] for general surgery consult  Surgery unable to replace tube at bedside  Evaluated by GI, plan was EGD but it was canceled since patient received DVT prophylaxis  Due to concerns for seizures [facility]  Hold DVT prophylaxis  IVF      Intractable epilepsy with status epilepticus [facility])  Assessment & Plan  Patient has a history of epilepsy with status epilepticus  She is on Onfi, Briviact, Cannabidiol, Vimpat, banzel and Topamax, all through PEG tube  Valium p r n  For breakthrough seizures  Previous attending discussed with Neurology  Current regiment IV Vimpat 100 mg b i d , loaded with Keppra 2000 mg, continue 1000 mg q 12 hours, Valium 1 mg q 6 hours  Plantar restart home AED S soon as possible after PEG tube placement and clearance by G I  No seizure-like activity so far  Seizure precautions      Lennox-Gastaut syndrome [facility])  Assessment & Plan  Patient has a history of Lennox-Gastaut syndrome with seizures and intellectual disability  She has a remote history of anoxic brain injury  Prior EEG monitoring reported frequent seizure activity despite multiple medication trials and vagus nerve stimulator placement  Follows neurology      VTE Pharmacologic Prophylaxis:   Hold for EGD tomorrow  Code Status: Level 1 - Full Code   Discussion with family: Updated  (father) via phone      Anticipated Length of Stay: Patient will be admitted on an inpatient basis with an anticipated length of stay of greater than 2 midnights secondary to Dislodged PEG tube     Total Time for Visit, including Counseling / Coordination of Care: 45 minutes Greater than 50% of this total time spent on direct patient counseling and coordination of care  Chief Complaint:  Dislodged back to    History of Present Illness:  Becky Virk is a 37 y o  female with a PMH of Lennox-Gastaut syndrome, history of epilepsy with status epilepticus, history of anoxic brain injury who presents with this/peg tube  Patient admitted to [facility], unsuccessful PEG replacement in the ED and by surgery at bedside  Plan was to do EGD to better visualize the gastrostomy tract by Gastroenterology but it was postponed because patient received DVT prophylaxis  Patient is on multiple antiseizure medications given through PEG tube  Previous attending discussed with neurology and adjusted her medications IV  Patient was transferred here due to higher level of care because of possible seizures and GI eval   Plan is peg tube placement tomorrow and restarting antiseizure medications as soon as possible and cleared by GI  Patient is nonverbal, not following commands at baseline  Comfortable      Review of Systems:  Review of Systems unable to obtain    Past Medical and Surgical History:   Past Medical History:   Diagnosis Date    ADHD     [facility]oxic [facility] [address] )     Autistic disorder     Breakthrough seizure ([facility] [address] ) 8/1/2019    Dysphagia     Dysphagia, oropharyngeal phase     Gastrostomy tube dependent ([facility] [address] )     14 Fr as of 05/19/2020    Hyperammonemia (HCC)     Hyperkeratosis     Hypotension     Intellectual disability     Lennox-Gastaut syndrome with tonic seizures ([facility] [address] )     Lethargy     Liver enzyme elevation     Onychomycosis     Osteoporosis     Osteoporosis        Past Surgical History:   Procedure Laterality Date    ABDOMINAL SURGERY      CARDIAC PACEMAKER PLACEMENT      vns implant l chest    IR GASTROSTOMY TUBE PLACEMENT  11/21/2019    IR THORACENTESIS  12/17/2018    JEJUNOSTOMY FEEDING TUBE      history of - most recently, PEG tube    PEG TUBE PLACEMENT      OR IMP STIM,CRANIAL,SUBQ,1 ARRAY Left 12/18/2019    Procedure: REPLACEMENT IMPLANTABLE PULSE GENERATOR FOR VAGAL NERVE STIMULATOR, LEFT CHEST;  Surgeon: Amy Pulliam MD;  [facility];  Service: Neurosurgery       Meds/Allergies:  Prior to Admission medications    Medication Sig Start Date End Date Taking? Authorizing Provider   acetaminophen (TYLENOL) 325 mg tablet Take 650 mg by mouth every 6 (six) hours as needed for mild pain or fever    Historical Provider, MD   bisacodyl (BISCOLAX) 10 mg suppository Insert 10 mg into the rectum daily at bedtime as needed for constipation (if no BM day #4)    Historical Provider, MD   bisacodyl (FLEET BISACODYL) 10 MG/30ML ENEM Insert 10 mg into the rectum as needed for constipation Give at hs on day 5 if suppository is not effective    Historical Provider, MD   Brivaracetam (Briviact) 10 MG/ML SOLN oral solution 5 mL (50 mg total) by Per PEG Tube route every 12 (twelve) hours 7/8/21   Pierre Llanos MD   cannabidiol (Epidiolex) 100 mg/ml SOLN 4 5 mL (450 mg total) by Per G Tube route 2 (two) times a day 7/8/21   Pierre Llanos MD   cloBAZam (ONFI) 10 MG tablet Give one and a half tab at bedtime by G-Tube 7/8/21   Pierre Llanos MD   diazepam (VALIUM) 5 MG/ML solution If the patient has a seizure lasting more than 3 minutes then give 1mL by PEG tube, may repeat 1mL if she continues to have seizure for more than 10 minutes  7/12/21   Pierre Llanos MD   lacosamide (Vimpat) 10 mg/mL Give by G-tube, give 5mL every 12 hours   7/8/21   Pierre Llanos MD   magnesium hydroxide (MILK OF MAGNESIA) 400 mg/5 mL oral suspension Take 30 mL by mouth daily as needed for constipation If not BM by day 3    Historical Provider, MD   melatonin 3 mg 6 mg by Per G Tube route daily at bedtime    Historical Provider, MD   Multiple Vitamins-Minerals (MULTIVITAMIN WITH IRON-MINERALS) liquid 5 mL by Per G Tube route daily    Historical Provider, MD   Probiotic Product (ALEXANDER-BID PROBIOTIC PO) 1 tablet by Per G Tube route daily      Historical Provider, MD   rufinamide (BANZEL) 400 mg tablet 4 tablets (1,600 mg total) by Per G Tube route every 12 (twelve) hours 7/8/21   Joshua John MD   topiramate (TOPAMAX) 50 MG tablet 5 tablets (250 mg total) by Per G Tube route every 12 (twelve) hours 7/8/21   Joshua John MD     I have reviewed home medications using recent Epic encounter  Allergies: Allergies   Allergen Reactions    Felbamate        Social History:  Marital Status: Single   Substance Use History:   Social History     Substance and Sexual Activity   Alcohol Use Not Currently     Social History     Tobacco Use   Smoking Status Never Smoker   Smokeless Tobacco Never Used     Social History     Substance and Sexual Activity   Drug Use No       Family History:  No family history on file  Physical Exam:     Vitals:   Blood Pressure: 95/71 (07/20/21 1621)  Pulse: 77 (07/20/21 1621)  Temperature: (!) 97 1 °F (36 2 °C) (07/20/21 1621)  SpO2: 97 % (07/20/21 1621)    Physical Exam  Constitutional:       Comments: Sleeping, nonverbal, not following commands, not in acute distress, chronically ill-appearing   HENT:      Head: Atraumatic  Cardiovascular:      Rate and Rhythm: Normal rate and regular rhythm  Heart sounds: No murmur heard  No friction rub  No gallop  Pulmonary:      Effort: Pulmonary effort is normal  No respiratory distress  Breath sounds: Normal breath sounds  No wheezing  Abdominal:      General: Bowel sounds are normal  There is no distension  Palpations: Abdomen is soft  Tenderness: There is no abdominal tenderness  Comments: Peg tube site    Musculoskeletal:         General: No swelling  Cervical back: Neck supple  Skin:     General: Skin is warm and dry     Neurological:      Comments: Contractures, not following commands at baseline Psychiatric:      Comments: Unable to assess        Additional Data:     Lab Results:  Results from last 7 days   Lab Units 07/20/21  0501   WBC Thousand/uL 6 11   HEMOGLOBIN g/dL 13 5   HEMATOCRIT % 42 9   PLATELETS Thousands/uL 204   NEUTROS PCT % 46   LYMPHS PCT % 39   MONOS PCT % 10   EOS PCT % 3     Results from last 7 days   Lab Units 07/20/21  0501   SODIUM mmol/L 145   POTASSIUM mmol/L 3 6   CHLORIDE mmol/L 114*   CO2 mmol/L 20*   BUN mg/dL 18   CREATININE mg/dL 0 38*   ANION GAP mmol/L 11   CALCIUM mg/dL 9 1   GLUCOSE RANDOM mg/dL 78                       Imaging:  No imaging  No orders to display       EKG and Other Studies Reviewed on Admission:   · EKG:  Not obtain    ** Please Note: This note has been constructed using a voice recognition system   **

## 2021-07-21 ENCOUNTER — APPOINTMENT (INPATIENT)
Dept: GASTROENTEROLOGY | Facility: HOSPITAL | Age: 41
DRG: 394 | End: 2021-07-21
Payer: MEDICARE

## 2021-07-21 ENCOUNTER — ANESTHESIA EVENT (INPATIENT)
Dept: GASTROENTEROLOGY | Facility: HOSPITAL | Age: 41
DRG: 394 | End: 2021-07-21
Payer: MEDICARE

## 2021-07-21 ENCOUNTER — ANESTHESIA (INPATIENT)
Dept: GASTROENTEROLOGY | Facility: HOSPITAL | Age: 41
DRG: 394 | End: 2021-07-21
Payer: MEDICARE

## 2021-07-21 PROBLEM — E44.0 MODERATE PROTEIN-CALORIE MALNUTRITION (HCC): Status: ACTIVE | Noted: 2019-07-31

## 2021-07-21 LAB
ANION GAP SERPL CALCULATED.3IONS-SCNC: 12 MMOL/L (ref 4–13)
BASOPHILS # BLD AUTO: 0.07 THOUSANDS/ΜL (ref 0–0.1)
BASOPHILS NFR BLD AUTO: 1 % (ref 0–1)
BUN SERPL-MCNC: 11 MG/DL (ref 5–25)
CALCIUM SERPL-MCNC: 8.6 MG/DL (ref 8.3–10.1)
CHLORIDE SERPL-SCNC: 111 MMOL/L (ref 100–108)
CO2 SERPL-SCNC: 21 MMOL/L (ref 21–32)
CREAT SERPL-MCNC: 0.25 MG/DL (ref 0.6–1.3)
EOSINOPHIL # BLD AUTO: 0.18 THOUSAND/ΜL (ref 0–0.61)
EOSINOPHIL NFR BLD AUTO: 3 % (ref 0–6)
ERYTHROCYTE [DISTWIDTH] IN BLOOD BY AUTOMATED COUNT: 12.8 % (ref 11.6–15.1)
GFR SERPL CREATININE-BSD FRML MDRD: 153 ML/MIN/1.73SQ M
GLUCOSE SERPL-MCNC: 91 MG/DL (ref 65–140)
GLUCOSE SERPL-MCNC: 93 MG/DL (ref 65–140)
GLUCOSE SERPL-MCNC: 98 MG/DL (ref 65–140)
GLUCOSE SERPL-MCNC: 99 MG/DL (ref 65–140)
HCT VFR BLD AUTO: 38.5 % (ref 34.8–46.1)
HGB BLD-MCNC: 12.3 G/DL (ref 11.5–15.4)
IMM GRANULOCYTES # BLD AUTO: 0.01 THOUSAND/UL (ref 0–0.2)
IMM GRANULOCYTES NFR BLD AUTO: 0 % (ref 0–2)
LYMPHOCYTES # BLD AUTO: 2.34 THOUSANDS/ΜL (ref 0.6–4.47)
LYMPHOCYTES NFR BLD AUTO: 44 % (ref 14–44)
MAGNESIUM SERPL-MCNC: 2 MG/DL (ref 1.6–2.6)
MCH RBC QN AUTO: 31.9 PG (ref 26.8–34.3)
MCHC RBC AUTO-ENTMCNC: 31.9 G/DL (ref 31.4–37.4)
MCV RBC AUTO: 100 FL (ref 82–98)
MONOCYTES # BLD AUTO: 0.57 THOUSAND/ΜL (ref 0.17–1.22)
MONOCYTES NFR BLD AUTO: 11 % (ref 4–12)
NEUTROPHILS # BLD AUTO: 2.21 THOUSANDS/ΜL (ref 1.85–7.62)
NEUTS SEG NFR BLD AUTO: 41 % (ref 43–75)
NRBC BLD AUTO-RTO: 0 /100 WBCS
PLATELET # BLD AUTO: 199 THOUSANDS/UL (ref 149–390)
PMV BLD AUTO: 12 FL (ref 8.9–12.7)
POTASSIUM SERPL-SCNC: 4.5 MMOL/L (ref 3.5–5.3)
RBC # BLD AUTO: 3.86 MILLION/UL (ref 3.81–5.12)
SODIUM SERPL-SCNC: 144 MMOL/L (ref 136–145)
WBC # BLD AUTO: 5.38 THOUSAND/UL (ref 4.31–10.16)

## 2021-07-21 PROCEDURE — 99222 1ST HOSP IP/OBS MODERATE 55: CPT | Performed by: INTERNAL MEDICINE

## 2021-07-21 PROCEDURE — 43246 EGD PLACE GASTROSTOMY TUBE: CPT | Performed by: INTERNAL MEDICINE

## 2021-07-21 PROCEDURE — 85025 COMPLETE CBC W/AUTO DIFF WBC: CPT | Performed by: INTERNAL MEDICINE

## 2021-07-21 PROCEDURE — 99232 SBSQ HOSP IP/OBS MODERATE 35: CPT | Performed by: PHYSICIAN ASSISTANT

## 2021-07-21 PROCEDURE — 80048 BASIC METABOLIC PNL TOTAL CA: CPT | Performed by: INTERNAL MEDICINE

## 2021-07-21 PROCEDURE — 83735 ASSAY OF MAGNESIUM: CPT | Performed by: INTERNAL MEDICINE

## 2021-07-21 PROCEDURE — 0DH68UZ INSERTION OF FEEDING DEVICE INTO STOMACH, VIA NATURAL OR ARTIFICIAL OPENING ENDOSCOPIC: ICD-10-PCS | Performed by: INTERNAL MEDICINE

## 2021-07-21 PROCEDURE — 82948 REAGENT STRIP/BLOOD GLUCOSE: CPT

## 2021-07-21 RX ORDER — SODIUM CHLORIDE 9 MG/ML
INJECTION, SOLUTION INTRAVENOUS CONTINUOUS PRN
Status: DISCONTINUED | OUTPATIENT
Start: 2021-07-21 | End: 2021-07-21

## 2021-07-21 RX ORDER — CEFAZOLIN SODIUM 1 G/50ML
SOLUTION INTRAVENOUS AS NEEDED
Status: DISCONTINUED | OUTPATIENT
Start: 2021-07-21 | End: 2021-07-21

## 2021-07-21 RX ORDER — PROPOFOL 10 MG/ML
INJECTION, EMULSION INTRAVENOUS AS NEEDED
Status: DISCONTINUED | OUTPATIENT
Start: 2021-07-21 | End: 2021-07-21

## 2021-07-21 RX ADMIN — SODIUM CHLORIDE: 0.9 INJECTION, SOLUTION INTRAVENOUS at 16:37

## 2021-07-21 RX ADMIN — DIAZEPAM 1 MG: 10 INJECTION, SOLUTION INTRAMUSCULAR; INTRAVENOUS at 05:56

## 2021-07-21 RX ADMIN — LEVETIRACETAM 1000 MG: 100 INJECTION, SOLUTION INTRAVENOUS at 05:55

## 2021-07-21 RX ADMIN — PROPOFOL 40 MG: 10 INJECTION, EMULSION INTRAVENOUS at 16:54

## 2021-07-21 RX ADMIN — DEXTROSE AND SODIUM CHLORIDE 75 ML/HR: 5; .45 INJECTION, SOLUTION INTRAVENOUS at 08:43

## 2021-07-21 RX ADMIN — PROPOFOL 40 MG: 10 INJECTION, EMULSION INTRAVENOUS at 16:59

## 2021-07-21 RX ADMIN — DIAZEPAM 1 MG: 10 INJECTION, SOLUTION INTRAMUSCULAR; INTRAVENOUS at 12:22

## 2021-07-21 RX ADMIN — DIAZEPAM 1 MG: 10 INJECTION, SOLUTION INTRAMUSCULAR; INTRAVENOUS at 00:29

## 2021-07-21 RX ADMIN — PROPOFOL 30 MG: 10 INJECTION, EMULSION INTRAVENOUS at 17:05

## 2021-07-21 RX ADMIN — SODIUM CHLORIDE 100 MG: 9 INJECTION, SOLUTION INTRAVENOUS at 10:23

## 2021-07-21 RX ADMIN — DIAZEPAM 1 MG: 10 INJECTION, SOLUTION INTRAMUSCULAR; INTRAVENOUS at 18:16

## 2021-07-21 RX ADMIN — LEVETIRACETAM 1000 MG: 100 INJECTION, SOLUTION INTRAVENOUS at 18:16

## 2021-07-21 RX ADMIN — PROPOFOL 50 MG: 10 INJECTION, EMULSION INTRAVENOUS at 16:46

## 2021-07-21 RX ADMIN — CEFTRIAXONE SODIUM 1000 MG: 10 INJECTION, POWDER, FOR SOLUTION INTRAVENOUS at 18:42

## 2021-07-21 RX ADMIN — CEFAZOLIN SODIUM 1000 MG: 1 SOLUTION INTRAVENOUS at 16:35

## 2021-07-21 RX ADMIN — LIDOCAINE HYDROCHLORIDE 100 MG: 20 INJECTION INTRAVENOUS at 16:46

## 2021-07-21 RX ADMIN — SODIUM CHLORIDE 100 MG: 9 INJECTION, SOLUTION INTRAVENOUS at 21:54

## 2021-07-21 NOTE — CONSULTS
Consultation - KELBY Hernandez [facility] COTTAGE 37 y o  female MRN: [id number]  Unit/Bed#: [address] 212-01 Encounter: [id number]      Assessment/Plan      1  Dislodged gastrostomy tube    28-year-old nonverbal female with a past medical history of seizure disorder, intellectual disability, ADHD, autistic disorder, severe protein calorie malnutrition and dysphagia requiring a longstanding gastrostomy tube feedings  She was admitted to [facility] is for dislodged 14 [place] PEG tube with unsuccessful reinsertion at the bedside  It is believed that the fistula tract is now closed  She has had multiple tube replacements with the last one being on May 31st   Patient receives multiple seizure medications through this PEG tube as she does not take anything by mouth  She was given Lovenox on the morning of 7/20 so she was unable to be taken for a procedure  Due to her concern for seizure, she was transferred to [facility] to be observed until PEG can be placed  - Lovenox on hold  - plan for EGD and PEG tube placement  Inpatient consult to gastroenterology  Consult performed by: KATHY Guidry  Consult ordered by: Jake Torres MD          Physician Requesting Consult: Jake Torres MD  Reason for Consult / Principal Problem:  Dislodged PEG tube, EGD and new PEG tube placement  HPI: Yamel Woodson is a 37y o  year old nonverbal female with a past medical history of seizure disorder, intellectual disability, ADHD, autistic disorder, severe protein calorie malnutrition and dysphagia requiring a longstanding gastrostomy tube feedings  She had a PEG tube placed in 2017 and has had multiple replacements with the last one being on 5/31/21  She relies on this PEG tube for all oral intake and is on multiple seizure medications  Patient is nonverbal and all information has been obtained from electronic medical record  Per nursing facility, the patient removed her PEG tube earlier in the day on 7/20/2021        Review of Systems     Unable to be obtained      Historical Information   Past Medical History:   Diagnosis Date    ADHD     Anoxic brain damage [facility] [address] )     Autistic disorder     Breakthrough seizure ([facility] [address] ) 8/1/2019    Dysphagia     Dysphagia, oropharyngeal phase     Gastrostomy tube dependent (HCC)     14 Fr as of 05/19/2020    Hyperammonemia (HCC)     Hyperkeratosis     Hypotension     Intellectual disability     Lennox-Gastaut syndrome with tonic seizures (HCC)     Lethargy     Liver enzyme elevation     Onychomycosis     Osteoporosis     Osteoporosis      Past Surgical History:   Procedure Laterality Date    ABDOMINAL SURGERY      CARDIAC PACEMAKER PLACEMENT      vns implant l chest    IR GASTROSTOMY TUBE PLACEMENT  11/21/2019    IR THORACENTESIS  12/17/2018    JEJUNOSTOMY FEEDING TUBE      history of - most recently, PEG tube    PEG TUBE PLACEMENT      MO IMP STIM,CRANIAL,SUBQ,1 ARRAY Left 12/18/2019    Procedure: REPLACEMENT IMPLANTABLE PULSE GENERATOR FOR VAGAL NERVE STIMULATOR, LEFT CHEST;  Surgeon: Nguyen Blue MD;  Location: [facility];  Service: Neurosurgery     Social History   Social History     Substance and Sexual Activity   Alcohol Use Not Currently     Social History     Substance and Sexual Activity   Drug Use No     Social History     Tobacco Use   Smoking Status Never Smoker   Smokeless Tobacco Never Used     No family history on file      Meds/Allergies   Current Facility-Administered Medications   Medication Dose Route Frequency    dextrose 5 % and sodium chloride 0 45 % infusion  75 mL/hr Intravenous Continuous    diazepam (VALIUM) injection 1 mg  1 mg Intravenous Q6H    diazepam (VALIUM) injection 5 mg  5 mg Intravenous Once PRN    lacosamide (VIMPAT) 100 mg in sodium chloride 0 9 % 100 mL IVPB  100 mg Intravenous Q12H    levETIRAcetam (KEPPRA) 1,000 mg in sodium chloride 0 9 % 100 mL IVPB  1,000 mg Intravenous Q12H [address]    ondansetron (ZOFRAN) injection 4 mg  4 mg Intravenous Q6H PRN     Medications Prior to Admission   Medication    acetaminophen (TYLENOL) 325 mg tablet    bisacodyl (BISCOLAX) 10 mg suppository    bisacodyl (FLEET BISACODYL) 10 MG/30ML ENEM    Brivaracetam (Briviact) 10 MG/ML SOLN oral solution    cannabidiol (Epidiolex) 100 mg/ml SOLN    cloBAZam (ONFI) 10 MG tablet    diazepam (VALIUM) 5 MG/ML solution    lacosamide (Vimpat) 10 mg/mL    magnesium hydroxide (MILK OF MAGNESIA) 400 mg/5 mL oral suspension    melatonin 3 mg    Multiple Vitamins-Minerals (MULTIVITAMIN WITH IRON-MINERALS) liquid    Probiotic Product (ALEXANDER-BID PROBIOTIC PO)    rufinamide (BANZEL) 400 mg tablet    topiramate (TOPAMAX) 50 MG tablet       Allergies   Allergen Reactions    Felbamate            Physical Exam   Constitutional: Nonverbal  HENT: WNL  Head: Normocephalic and atraumatic  Neck:  Neck supple, symmetrical, trachea midline  Cardiovascular: Normal rate and regular rhythm  Pulmonary/Chest: Effort normal and breath sounds normal    Abdominal: Soft  Bowel sounds are normal, no masses  Peg tube insertion site in the left upper quadrant    Peg tube not in place   Extremities:  No edema, no clubbing  Neurological:non-verbal   Skin: Skin is warm and dry, no jaundice         Lab Results:   Admission on 07/20/2021   Component Date Value    POC Glucose 07/20/2021 65     POC Glucose 07/20/2021 91     Sodium 07/21/2021 144     Potassium 07/21/2021 4 5     Chloride 07/21/2021 111*    CO2 07/21/2021 21     ANION GAP 07/21/2021 12     BUN 07/21/2021 11     Creatinine 07/21/2021 0 25*    Glucose 07/21/2021 93     Calcium 07/21/2021 8 6     eGFR 07/21/2021 153     WBC 07/21/2021 5 38     RBC 07/21/2021 3 86     Hemoglobin 07/21/2021 12 3     Hematocrit 07/21/2021 38 5     MCV 07/21/2021 100*    MCH 07/21/2021 31 9     MCHC 07/21/2021 31 9     RDW 07/21/2021 12 8     MPV 07/21/2021 12 0     Platelets 27/68/3114 199     nRBC 07/21/2021 0     Neutrophils Relative 07/21/2021 41*    Immat GRANS % 07/21/2021 0     Lymphocytes Relative 07/21/2021 44     Monocytes Relative 07/21/2021 11     Eosinophils Relative 07/21/2021 3     Basophils Relative 07/21/2021 1     Neutrophils Absolute 07/21/2021 2 21     Immature Grans Absolute 07/21/2021 0 01     Lymphocytes Absolute 07/21/2021 2 34     Monocytes Absolute 07/21/2021 0 57     Eosinophils Absolute 07/21/2021 0 18     Basophils Absolute 07/21/2021 0 07     Magnesium 07/21/2021 2 0     POC Glucose 07/21/2021 99

## 2021-07-21 NOTE — CASE MANAGEMENT
Pt is a 15 day MA bed hold at [facility]- will require a covid test within 24 hrs of d/c back to facility

## 2021-07-21 NOTE — ASSESSMENT & PLAN NOTE
Malnutrition Findings:           BMI Findings: Body mass index is 17 81 kg/m²     · As evidence by fat loss and muscle loss with BMI 17  · Nutrition consult once PEG tube can be replaced  · Restart tube feeds when able

## 2021-07-21 NOTE — ASSESSMENT & PLAN NOTE
· Patient has a history of Lennox-Gastaut syndrome with seizures and intellectual disability  She has a remote history of anoxic brain injury    Prior EEG monitoring reported frequent seizure activity despite multiple medication trials and vagus nerve stimulator placement  · Follows neurology

## 2021-07-21 NOTE — PLAN OF CARE
Problem: Prexisting or High Potential for Compromised Skin Integrity  Goal: Skin integrity is maintained or improved  Description: INTERVENTIONS:  - Identify patients at risk for skin breakdown  - Assess and monitor skin integrity  - Assess and monitor nutrition and hydration status  - Monitor labs   - Assess for incontinence   - Turn and reposition patient  - Assist with mobility/ambulation  - Relieve pressure over bony prominences  - Avoid friction and shearing  - Provide appropriate hygiene as needed including keeping skin clean and dry  - Evaluate need for skin moisturizer/barrier cream  - Collaborate with interdisciplinary team   - Patient/family teaching  - Consider wound care consult   Outcome: Progressing     Problem: MOBILITY - ADULT  Goal: Maintain or return to baseline ADL function  Description: INTERVENTIONS:  -  Assess patient's ability to carry out ADLs; assess patient's baseline for ADL function and identify physical deficits which impact ability to perform ADLs (bathing, care of mouth/teeth, toileting, grooming, dressing, etc )  - Assess/evaluate cause of self-care deficits   - Assess range of motion  - Assess patient's mobility; develop plan if impaired  - Assess patient's need for assistive devices and provide as appropriate  - Encourage maximum independence but intervene and supervise when necessary  - Involve family in performance of ADLs  - Assess for home care needs following discharge   - Consider OT consult to assist with ADL evaluation and planning for discharge  - Provide patient education as appropriate  Outcome: Progressing  Goal: Maintains/Returns to pre admission functional level  Description: INTERVENTIONS:  - Perform BMAT or MOVE assessment daily    - Set and communicate daily mobility goal to care team and patient/family/caregiver     - Collaborate with rehabilitation services on mobility goals if consulted  - Out of bed for toileting  - Record patient progress and toleration of activity level   Outcome: Progressing     Problem: Potential for Falls  Goal: Patient will remain free of falls  Description: INTERVENTIONS:  - Educate patient/family on patient safety including physical limitations  - Instruct patient to call for assistance with activity   - Consult OT/PT to assist with strengthening/mobility   - Keep Call bell within reach  - Keep bed low and locked with side rails adjusted as appropriate  - Keep care items and personal belongings within reach  - Initiate and maintain comfort rounds  - Make Fall Risk Sign visible to staff  - Apply yellow socks and bracelet for high fall risk patients  - Consider moving patient to room near nurses station  Outcome: Progressing     Problem: METABOLIC, FLUID AND ELECTROLYTES - ADULT  Goal: Electrolytes maintained within normal limits  Description: INTERVENTIONS:  - Monitor labs and assess patient for signs and symptoms of electrolyte imbalances  - Administer electrolyte replacement as ordered  - Monitor response to electrolyte replacements, including repeat lab results as appropriate  - Instruct patient on fluid and nutrition as appropriate  Outcome: Progressing     Problem: SKIN/TISSUE INTEGRITY - ADULT  Goal: Skin Integrity remains intact(Skin Breakdown Prevention)  Description: Assess:  -Inspect skin when repositioning, toileting, and assisting with ADLS  -Assess extremities for adequate circulation and sensation     Bed Management:  -Have minimal linens on bed & keep smooth, unwrinkled  -Change linens as needed when moist or perspiring    Toileting:  -Offer bedside commode    Activity:  -Encourage activity and walks on unit  -Encourage or provide ROM exercises   -Use appropriate equipment to lift or move patient in bed    Skin Care:  -Avoid use of baby powder, tape, friction and shearing, hot water or constrictive clothing  -Do not massage red bony areas  Outcome: Progressing     Problem: Nutrition/Hydration-ADULT  Goal: Nutrient/Hydration intake appropriate for improving, restoring or maintaining nutritional needs  Description: Monitor and assess patient's nutrition/hydration status for malnutrition  Collaborate with interdisciplinary team and initiate plan and interventions as ordered  Monitor patient's weight and dietary intake as ordered or per policy  Utilize nutrition screening tool and intervene as necessary  Determine patient's food preferences and provide high-protein, high-caloric foods as appropriate       INTERVENTIONS:  - Monitor oral intake, urinary output, labs, and treatment plans  - Assess nutrition and hydration status and recommend course of action  - Evaluate amount of meals eaten  - Assist patient with eating if necessary   - Allow adequate time for meals  - Recommend/ encourage appropriate diets, oral nutritional supplements, and vitamin/mineral supplements  - Order, calculate, and assess calorie counts as needed  - Recommend, monitor, and adjust tube feedings and TPN/PPN based on assessed needs  - Assess need for intravenous fluids  - Provide specific nutrition/hydration education as appropriate  - Include patient/family/caregiver in decisions related to nutrition  Outcome: Progressing

## 2021-07-21 NOTE — NUTRITION
07/21/21 1419   Recommendations/Interventions   Nutrition Recommendations Other (Specify)  (when able to resume tube feeding, recommend Jevity 1 2 with goal rate of 52ml/hr to provide 1248ml, 1498kcal, 69g pro, 1011ml free water  Add 100ml water flush q 4 hours to provide a total of 1611ml free water from TF + flushes   Monitor weight and lytes)

## 2021-07-21 NOTE — ASSESSMENT & PLAN NOTE
· Patient has a history of epilepsy with status epilepticus  She is on Onfi, Briviact, Cannabidiol, Vimpat, banzel and Topamax, all through PEG tube  Valium p r n  For breakthrough seizures  · Previous attending discussed with Neurology    Current regiment IV Vimpat 100 mg b i d , loaded with Keppra 2000 mg, continue 1000 mg q 12 hours, Valium 1 mg q 6 hours  · Plan to restart home antiepileptics as soon as cleared by GI once PEG tube can be inserted  · Patient remains without any seizure-like activity while inpatient  · Seizure precautions

## 2021-07-21 NOTE — ASSESSMENT & PLAN NOTE
· Dislodged PEG tube, unsuccessful atemp to replace it in the ED  Patient was admitted [facility] [facility] for general surgery consult  Surgery unable to replace tube at bedside  · Evaluated by GI, plan was EGD but it was canceled since patient received DVT prophylaxis    · Continue IV seizure medications until PEG tube is functioning, plan to switch back to oral regimen this evening  · Plan for EGD with PEG tube placement this afternoon with GI  · DVT prophylaxis on hold

## 2021-07-21 NOTE — PROGRESS NOTES
[address] [place] [address]  Progress Note Adelita  1980, 37 y o  female MRN: [id number]  Unit/Bed#: Bju [facility] Chidi 87 [id number] Encounter: [id number]  Primary Care Provider: Red Walker DO   Date and time admitted to hospital: 7/20/2021  4:06 PM    * Dislodged gastrostomy tube  Assessment & Plan  · Dislodged PEG tube, unsuccessful atemp to replace it in the ED  Patient was admitted [facility] for general surgery consult  Surgery unable to replace tube at bedside  · Evaluated by GI, plan was EGD but it was canceled since patient received DVT prophylaxis  · Continue IV seizure medications until PEG tube is functioning, plan to switch back to oral regimen this evening  · Plan for EGD with PEG tube placement this afternoon with GI  · DVT prophylaxis on hold    Moderate protein-calorie malnutrition ([facility] )  Assessment & Plan  Malnutrition Findings:           BMI Findings: Body mass index is 17 81 kg/m²  · As evidence by fat loss and muscle loss with BMI 17  · Nutrition consult once PEG tube can be replaced  · Restart tube feeds when able    Intractable epilepsy with status epilepticus [facility])  Assessment & Plan  · Patient has a history of epilepsy with status epilepticus  She is on Onfi, Briviact, Cannabidiol, Vimpat, banzel and Topamax, all through PEG tube  Valium p r n  For breakthrough seizures  · Previous attending discussed with Neurology  Current regiment IV Vimpat 100 mg b i d , loaded with Keppra 2000 mg, continue 1000 mg q 12 hours, Valium 1 mg q 6 hours  · Plan to restart home antiepileptics as soon as cleared by GI once PEG tube can be inserted  · Patient remains without any seizure-like activity while inpatient  · Seizure precautions    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[facility] )  Assessment & Plan  · Patient has a history of Lennox-Gastaut syndrome with seizures and intellectual disability  She has a remote history of anoxic brain injury    Prior EEG monitoring reported frequent seizure activity despite multiple medication trials and vagus nerve stimulator placement  · Follows neurology        VTE Pharmacologic Prophylaxis:   High Risk (Score >/= 5) - Pharmacological DVT Prophylaxis Contraindicated  Sequential Compression Devices Ordered  Patient Centered Rounds: I performed bedside rounds with nursing staff today  Discussions with Specialists or Other Care Team Provider: case management     Education and Discussions with Family / Patient: Attempted to update  (father and mother) via phone  Left voicemail  Time Spent for Care: 30 minutes  More than 50% of total time spent on counseling and coordination of care as described above  Current Length of Stay: 1 day(s)  Current Patient Status: Inpatient   Certification Statement: The patient will continue to require additional inpatient hospital stay due to PEG tube dislodgement requiring replacement  Discharge Plan: Anticipate discharge tomorrow to SNF    Code Status: Level 1 - Full Code    Subjective:   Patient seen examined at bedside  Does not offer any complaints, does not follow commands  Objective:     Vitals:   Temp (24hrs), Av 9 °F (36 6 °C), Min:97 1 °F (36 2 °C), Max:98 7 °F (37 1 °C)    Temp:  [97 1 °F (36 2 °C)-98 7 °F (37 1 °C)] 98 7 °F (37 1 °C)  HR:  [70-79] 79  Resp:  [12-18] 18  BP: ()/(66-77) 155/77  SpO2:  [97 %-98 %] 98 %  Body mass index is 17 81 kg/m²  Input and Output Summary (last 24 hours): Intake/Output Summary (Last 24 hours) at 2021 1228  Last data filed at 2021 0442  Gross per 24 hour   Intake --   Output 236 ml   Net -236 ml       Physical Exam:   Physical Exam  Vitals reviewed  Constitutional:       General: She is not in acute distress  HENT:      Head: Normocephalic and atraumatic  Eyes:      General: No scleral icterus  Conjunctiva/sclera: Conjunctivae normal    Cardiovascular:      Rate and Rhythm: Normal rate and regular rhythm  Heart sounds:  No murmur heard  Pulmonary:      Effort: Pulmonary effort is normal  No respiratory distress  Breath sounds: Normal breath sounds  Abdominal:      General: Bowel sounds are normal  There is no distension  Palpations: Abdomen is soft  Tenderness: There is no abdominal tenderness  Musculoskeletal:      Cervical back: Neck supple  Right lower leg: No edema  Left lower leg: No edema  Skin:     General: Skin is warm and dry  Neurological:      Mental Status: Mental status is at baseline  Psychiatric:         Mood and Affect: Mood normal           Additional Data:     Labs:  Results from last 7 days   Lab Units 07/21/21  0508   WBC Thousand/uL 5 38   HEMOGLOBIN g/dL 12 3   HEMATOCRIT % 38 5   PLATELETS Thousands/uL 199   NEUTROS PCT % 41*   LYMPHS PCT % 44   MONOS PCT % 11   EOS PCT % 3     Results from last 7 days   Lab Units 07/21/21  0508   SODIUM mmol/L 144   POTASSIUM mmol/L 4 5   CHLORIDE mmol/L 111*   CO2 mmol/L 21   BUN mg/dL 11   CREATININE mg/dL 0 25*   ANION GAP mmol/L 12   CALCIUM mg/dL 8 6   GLUCOSE RANDOM mg/dL 93         Results from last 7 days   Lab Units 07/21/21  1157 07/21/21  0444 07/20/21  2357 07/20/21  1816   POC GLUCOSE mg/dl 91 99 91 65               Lines/Drains:  Invasive Devices     Peripheral Intravenous Line            Peripheral IV 07/18/21 Right;Dorsal (posterior) Hand 2 days          Drain            Gastrostomy/Enterostomy Gastrostomy 14 Fr   days                      Imaging: No pertinent imaging reviewed      Recent Cultures (last 7 days):         Last 24 Hours Medication List:   Current Facility-Administered Medications   Medication Dose Route Frequency Provider Last Rate    dextrose 5 % and sodium chloride 0 45 %  75 mL/hr Intravenous Continuous Jose Buchanan MD 75 mL/hr (07/21/21 0843)    diazepam  1 mg Intravenous Q6H Yanira Sommer MD      diazepam  5 mg Intravenous Once PRN Jose Buchanan MD      lacosamide (VIMPAT) IVPB 100 mg Intravenous Q12H Yanira Sommer  mg (07/21/21 1023)    levETIRAcetam  1,000 mg Intravenous Q12H [address] Adryan Deleon MD 1,000 mg (07/21/21 0555)    ondansetron  4 mg Intravenous Q6H PRN Adryan Deleon MD          Today, Patient Was Seen By: Zahraa Mcgill PA-C    **Please Note: This note may have been constructed using a voice recognition system  **

## 2021-07-21 NOTE — ANESTHESIA PREPROCEDURE EVALUATION
Review of Systems/Medical History  Patient summary reviewed  Chart reviewed  No history of anesthetic complications     Cardiovascular   Pulmonary  Negative pulmonary ROS        GI/Hepatic      Comment:  gastrostomy tube      Negative  ROS        Endo/Other  Negative endo/other ROS      GYN  Negative gynecology ROS          Hematology  Negative hematology ROS      Musculoskeletal       Neurology  Seizures (Intractable epilepsy without status epilepticus) poorly controlled,    Comment: Lennox-Gastaut syndrome with tonic seizures     Cerebral anoxic injury  Psychology   Psychiatric history,     Comment: Autistic disorder         Physical Exam    Airway    Mallampati score: II  TM Distance: >3 FB  Neck ROM: full     Dental       Cardiovascular  Rhythm: regular, Rate: normal,     Pulmonary  Pulmonary exam normal Breath sounds clear to auscultation,     Other Findings        Anesthesia Plan  ASA Score- 3 Emergent    Anesthesia Type- IV sedation with anesthesia and general with ASA Monitors  Additional Monitors:   Airway Plan:           Plan Factors-    Chart reviewed  Existing labs reviewed  Patient summary reviewed  Patient is not a current smoker  Patient not instructed to abstain from smoking on day of procedure  Patient did not smoke on day of surgery  Induction- intravenous  Postoperative Plan-     Informed Consent- Anesthetic plan and risks discussed with legal guardian (Father (Oneil)-mobile phone listed on chart  )

## 2021-07-22 VITALS
DIASTOLIC BLOOD PRESSURE: 59 MMHG | HEART RATE: 84 BPM | WEIGHT: 104.72 LBS | BODY MASS INDEX: 18.55 KG/M2 | TEMPERATURE: 98.5 F | OXYGEN SATURATION: 97 % | RESPIRATION RATE: 17 BRPM | SYSTOLIC BLOOD PRESSURE: 108 MMHG

## 2021-07-22 VITALS
TEMPERATURE: 97.9 F | RESPIRATION RATE: 14 BRPM | DIASTOLIC BLOOD PRESSURE: 72 MMHG | SYSTOLIC BLOOD PRESSURE: 152 MMHG | OXYGEN SATURATION: 97 % | WEIGHT: 96.56 LBS | BODY MASS INDEX: 17.11 KG/M2 | HEIGHT: 63 IN | HEART RATE: 68 BPM

## 2021-07-22 LAB
ANION GAP SERPL CALCULATED.3IONS-SCNC: 12 MMOL/L (ref 4–13)
BASOPHILS # BLD AUTO: 0.05 THOUSANDS/ΜL (ref 0–0.1)
BASOPHILS NFR BLD AUTO: 0 % (ref 0–1)
BUN SERPL-MCNC: 7 MG/DL (ref 5–25)
CALCIUM SERPL-MCNC: 8.5 MG/DL (ref 8.3–10.1)
CHLORIDE SERPL-SCNC: 108 MMOL/L (ref 100–108)
CO2 SERPL-SCNC: 22 MMOL/L (ref 21–32)
CREAT SERPL-MCNC: 0.32 MG/DL (ref 0.6–1.3)
EOSINOPHIL # BLD AUTO: 0.04 THOUSAND/ΜL (ref 0–0.61)
EOSINOPHIL NFR BLD AUTO: 0 % (ref 0–6)
ERYTHROCYTE [DISTWIDTH] IN BLOOD BY AUTOMATED COUNT: 12.4 % (ref 11.6–15.1)
GFR SERPL CREATININE-BSD FRML MDRD: 141 ML/MIN/1.73SQ M
GLUCOSE SERPL-MCNC: 113 MG/DL (ref 65–140)
GLUCOSE SERPL-MCNC: 119 MG/DL (ref 65–140)
GLUCOSE SERPL-MCNC: 130 MG/DL (ref 65–140)
GLUCOSE SERPL-MCNC: 132 MG/DL (ref 65–140)
HCT VFR BLD AUTO: 36.8 % (ref 34.8–46.1)
HGB BLD-MCNC: 12.3 G/DL (ref 11.5–15.4)
IMM GRANULOCYTES # BLD AUTO: 0.06 THOUSAND/UL (ref 0–0.2)
IMM GRANULOCYTES NFR BLD AUTO: 0 % (ref 0–2)
LYMPHOCYTES # BLD AUTO: 2.18 THOUSANDS/ΜL (ref 0.6–4.47)
LYMPHOCYTES NFR BLD AUTO: 15 % (ref 14–44)
MAGNESIUM SERPL-MCNC: 1.8 MG/DL (ref 1.6–2.6)
MCH RBC QN AUTO: 32.1 PG (ref 26.8–34.3)
MCHC RBC AUTO-ENTMCNC: 33.4 G/DL (ref 31.4–37.4)
MCV RBC AUTO: 96 FL (ref 82–98)
MONOCYTES # BLD AUTO: 0.69 THOUSAND/ΜL (ref 0.17–1.22)
MONOCYTES NFR BLD AUTO: 5 % (ref 4–12)
NEUTROPHILS # BLD AUTO: 11.34 THOUSANDS/ΜL (ref 1.85–7.62)
NEUTS SEG NFR BLD AUTO: 80 % (ref 43–75)
NRBC BLD AUTO-RTO: 0 /100 WBCS
PLATELET # BLD AUTO: 195 THOUSANDS/UL (ref 149–390)
PMV BLD AUTO: 11.2 FL (ref 8.9–12.7)
POTASSIUM SERPL-SCNC: 3.1 MMOL/L (ref 3.5–5.3)
RBC # BLD AUTO: 3.83 MILLION/UL (ref 3.81–5.12)
SARS-COV-2 RNA RESP QL NAA+PROBE: NEGATIVE
SODIUM SERPL-SCNC: 142 MMOL/L (ref 136–145)
WBC # BLD AUTO: 14.36 THOUSAND/UL (ref 4.31–10.16)

## 2021-07-22 PROCEDURE — U0003 INFECTIOUS AGENT DETECTION BY NUCLEIC ACID (DNA OR RNA); SEVERE ACUTE RESPIRATORY SYNDROME CORONAVIRUS 2 (SARS-COV-2) (CORONAVIRUS DISEASE [COVID-19]), AMPLIFIED PROBE TECHNIQUE, MAKING USE OF HIGH THROUGHPUT TECHNOLOGIES AS DESCRIBED BY CMS-2020-01-R: HCPCS | Performed by: PHYSICIAN ASSISTANT

## 2021-07-22 PROCEDURE — 83735 ASSAY OF MAGNESIUM: CPT | Performed by: INTERNAL MEDICINE

## 2021-07-22 PROCEDURE — 82948 REAGENT STRIP/BLOOD GLUCOSE: CPT

## 2021-07-22 PROCEDURE — 80048 BASIC METABOLIC PNL TOTAL CA: CPT | Performed by: INTERNAL MEDICINE

## 2021-07-22 PROCEDURE — U0005 INFEC AGEN DETEC AMPLI PROBE: HCPCS | Performed by: PHYSICIAN ASSISTANT

## 2021-07-22 PROCEDURE — 99239 HOSP IP/OBS DSCHRG MGMT >30: CPT | Performed by: PHYSICIAN ASSISTANT

## 2021-07-22 PROCEDURE — 85025 COMPLETE CBC W/AUTO DIFF WBC: CPT | Performed by: INTERNAL MEDICINE

## 2021-07-22 RX ORDER — CEFAZOLIN SODIUM 1 G/50ML
1000 SOLUTION INTRAVENOUS EVERY 8 HOURS
Status: DISCONTINUED | OUTPATIENT
Start: 2021-07-22 | End: 2021-07-22 | Stop reason: HOSPADM

## 2021-07-22 RX ORDER — ACETAMINOPHEN 650 MG/1
650 SUPPOSITORY RECTAL EVERY 6 HOURS PRN
Status: DISCONTINUED | OUTPATIENT
Start: 2021-07-22 | End: 2021-07-22 | Stop reason: HOSPADM

## 2021-07-22 RX ORDER — POTASSIUM CHLORIDE 20MEQ/15ML
40 LIQUID (ML) ORAL ONCE
Status: COMPLETED | OUTPATIENT
Start: 2021-07-22 | End: 2021-07-22

## 2021-07-22 RX ADMIN — CEFAZOLIN SODIUM 1000 MG: 1 SOLUTION INTRAVENOUS at 08:48

## 2021-07-22 RX ADMIN — POTASSIUM CHLORIDE 40 MEQ: 20 SOLUTION ORAL at 11:22

## 2021-07-22 RX ADMIN — DIAZEPAM 1 MG: 10 INJECTION, SOLUTION INTRAMUSCULAR; INTRAVENOUS at 00:46

## 2021-07-22 RX ADMIN — LEVETIRACETAM 1000 MG: 100 INJECTION, SOLUTION INTRAVENOUS at 05:24

## 2021-07-22 RX ADMIN — CEFAZOLIN SODIUM 1000 MG: 1 SOLUTION INTRAVENOUS at 00:56

## 2021-07-22 RX ADMIN — DIAZEPAM 1 MG: 10 INJECTION, SOLUTION INTRAMUSCULAR; INTRAVENOUS at 05:24

## 2021-07-22 RX ADMIN — ACETAMINOPHEN 650 MG: 650 SUPPOSITORY RECTAL at 00:49

## 2021-07-22 RX ADMIN — DIAZEPAM 1 MG: 10 INJECTION, SOLUTION INTRAMUSCULAR; INTRAVENOUS at 12:43

## 2021-07-22 RX ADMIN — DEXTROSE AND SODIUM CHLORIDE 75 ML/HR: 5; .45 INJECTION, SOLUTION INTRAVENOUS at 05:47

## 2021-07-22 RX ADMIN — SODIUM CHLORIDE 100 MG: 9 INJECTION, SOLUTION INTRAVENOUS at 10:02

## 2021-07-22 NOTE — DISCHARGE SUMMARY
[facility]  Discharge- Ivonne Essex [facility] COTTAGE 1980, 37 y o  female MRN: [id number]  Unit/Bed#: [facility] [address] 87 212-01 Encounter: [id number]  Primary Care Provider: Benita Ledezma DO   Date and time admitted to hospital: 7/20/2021  4:06 PM    * Dislodged gastrostomy tube  Assessment & Plan  · Dislodged PEG tube, unsuccessful atemp to replace it in the ED  Patient was admitted [facility] for general surgery consult  Surgery unable to replace tube at bedside  · Evaluated by GI, plan was EGD but it was canceled since patient received DVT prophylaxis  · Plan to resume oral seizure medications at discharge  · Patient underwent EGD with PEG tube placement on 07/21/2021  · Cleared by GI for resumption of tube feeds, will start now  · Patient did have slight fever overnight, likely stress response from PEG tube placement, did receive IV antibiotics pre and post insertion    Moderate protein-calorie malnutrition ([facility] )  Assessment & Plan  Malnutrition Findings:           BMI Findings:  Adult BMI Classifications: Underweight < 18 5     Body mass index is 18 55 kg/m²  · As evidence by fat loss and muscle loss with BMI 17  · Restart tube feeds    Intractable epilepsy with status epilepticus [facility])  Assessment & Plan  · Patient has a history of epilepsy with status epilepticus  She is on Onfi, Briviact, Cannabidiol, Vimpat, banzel and Topamax, all through PEG tube  Valium p r n  For breakthrough seizures  · Previous attending discussed with Neurology    Current regiment IV Vimpat 100 mg b i d , loaded with Keppra 2000 mg, continue 1000 mg q 12 hours, Valium 1 mg q 6 hours  · Can restart home antiepileptics at discharge, will continue current regimen while inpatient as many of her antiepileptics are non formulary  · Patient remains without any seizure-like activity while inpatient  · Seizure precautions    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[facility] )  Assessment & Plan  · Patient has a history of Lennox-Gastaut syndrome with seizures and intellectual disability  She has a remote history of anoxic brain injury  Prior EEG monitoring reported frequent seizure activity despite multiple medication trials and vagus nerve stimulator placement  · Follows neurology      Medical Problems     Resolved Problems  Date Reviewed: 7/22/2021    None              Discharging Physician / Practitioner: Justus Ruth PA-C  PCP: Javed Redmond DO  Admission Date:   Admission Orders (From admission, onward)     Ordered        07/20/21 1610  Inpatient Admission  Once                   Discharge Date: 07/22/21    Consultations During Hospital Stay:  · Gastroenterology    Procedures Performed:   EGD Peg Tube Placement    Result Date: 7/21/2021  Impression: The esophagus, stomach and duodenum appeared normal  PEG-G tube successfully placed in the stomach using a deformable internal bolster via the pull technique after the site was identified via transillumination, visualized indentation and needle passed through abdominal wall; scope reinserted and tube rotated freely to confirm placement Successful placement of 20 French feeding tube External bumper at 2 5 cm RECOMMENDATION: -nothing by mouth -close watch for bleeding -can use you PEG tube for feeding after examined by GI team tomorrow -if no complication can flush PEG tube and give meds in 4 hours -call GI team on call if there is any active issues  Sunil Greco MD       Significant Findings / Test Results:   · See above    Incidental Findings:   · See above     Test Results Pending at Discharge (will require follow up): · None     Outpatient Tests Requested:  · None    Complications:  None    Reason for Admission:  Displaced G-tube    Hospital Course:   Valentin Lopez is a 37 y o  female patient who originally presented to the hospital on 7/20/2021 due to displaced feeding tube  She was initially sent to [facility] for displaced feeding tube    Tube was unable to be inserted by ED or general surgery  [facility] with EGD and PEG tube placement  Given concern for significant seizure disorder and inability to take her seizure medications, patient was transferred to higher level of care at [address] [place] [address]  She was started on IV antiepileptics, no seizure-like activity since admission  Peg tube was inserted and patient was restarted on tube feeds  Patient to resume all home medications at discharge  Discharge back to SNF  Please see above list of diagnoses and related plan for additional information  Condition at Discharge: stable    Discharge Day Visit / Exam:   Subjective:  Patient seen and examined at bedside  Intermittently moving arms, does not follow commands  Does not verbalize any complaints  Vitals: Blood Pressure: 108/59 (07/22/21 0749)  Pulse: 84 (07/22/21 0749)  Temperature: 98 5 °F (36 9 °C) (07/22/21 0749)  Temp Source: Axillary (07/22/21 0749)  Respirations: 17 (07/22/21 0749)  Weight - Scale: 47 5 kg (104 lb 11 5 oz) (07/22/21 0600)  SpO2: 97 % (07/22/21 0749)  Exam:   Physical Exam  Vitals reviewed  Constitutional:       General: She is not in acute distress  HENT:      Head: Normocephalic and atraumatic  Eyes:      General: No scleral icterus  Conjunctiva/sclera: Conjunctivae normal    Cardiovascular:      Rate and Rhythm: Normal rate and regular rhythm  Heart sounds: No murmur heard  Pulmonary:      Effort: Pulmonary effort is normal  No respiratory distress  Breath sounds: Normal breath sounds  Abdominal:      General: Bowel sounds are normal  There is no distension  Palpations: Abdomen is soft  Tenderness: There is no abdominal tenderness  Musculoskeletal:      Cervical back: Neck supple  Right lower leg: No edema  Left lower leg: No edema  Skin:     General: Skin is warm and dry  Neurological:      Mental Status: Mental status is at baseline        Comments: Chronic contractures   Psychiatric:         Mood and Affect: Mood normal          Behavior: Behavior normal         Discharge instructions/Information to patient and family:   See after visit summary for information provided to patient and family  Provisions for Follow-Up Care:  See after visit summary for information related to follow-up care and any pertinent home health orders  Disposition:   Antoine Byrd at [address] [place] [address]    Planned Readmission:  None     Discharge Statement:  I spent 35 minutes discharging the patient  This time was spent on the day of discharge  I had direct contact with the patient on the day of discharge  Greater than 50% of the total time was spent examining patient, answering all patient questions, arranging and discussing plan of care with patient as well as directly providing post-discharge instructions  Additional time then spent on discharge activities  Discharge Medications:  See after visit summary for reconciled discharge medications provided to patient and/or family        **Please Note: This note may have been constructed using a voice recognition system**

## 2021-07-22 NOTE — NURSING NOTE
Pt discharged back to [facility] from [place] was given report  AVS sent with transport team/ PEG clamped  IV removed  All belongings taken with pt

## 2021-07-22 NOTE — ASSESSMENT & PLAN NOTE
· Dislodged PEG tube, unsuccessful atemp to replace it in the ED  Patient was admitted [facility] for general surgery consult  Surgery unable to replace tube at bedside  · Evaluated by GI, plan was EGD but it was canceled since patient received DVT prophylaxis    · Plan to resume oral seizure medications at discharge  · Patient underwent EGD with PEG tube placement on 07/21/2021  · Cleared by GI for resumption of tube feeds, will start now  · Patient did have slight fever overnight, likely stress response from PEG tube placement, did receive IV antibiotics pre and post insertion

## 2021-07-22 NOTE — CASE MANAGEMENT
Pt anticipated d/c to [facility] this day post G-Tube replacement  Transportation via [employer] arranged for [phone] - facility/attending/RN/pt's father made aware of same  CMN completed, faxed to [facility] and placed in dc folder  No further d/c needs identified at this time

## 2021-07-22 NOTE — ASSESSMENT & PLAN NOTE
· Patient has a history of epilepsy with status epilepticus  She is on Onfi, Briviact, Cannabidiol, Vimpat, banzel and Topamax, all through PEG tube  Valium p r n  For breakthrough seizures  · Previous attending discussed with Neurology    Current regiment IV Vimpat 100 mg b i d , loaded with Keppra 2000 mg, continue 1000 mg q 12 hours, Valium 1 mg q 6 hours  · Can restart home antiepileptics at discharge, will continue current regimen while inpatient as many of her antiepileptics are non formulary  · Patient remains without any seizure-like activity while inpatient  · Seizure precautions

## 2021-07-22 NOTE — ASSESSMENT & PLAN NOTE
Malnutrition Findings:           BMI Findings:  Adult BMI Classifications: Underweight < 18 5     Body mass index is 18 55 kg/m²     · As evidence by fat loss and muscle loss with BMI 17  · Restart tube feeds

## 2021-07-26 ENCOUNTER — TELEPHONE (OUTPATIENT)
Dept: NEUROLOGY | Facility: CLINIC | Age: 41
End: 2021-07-26

## 2021-07-26 NOTE — TELEPHONE ENCOUNTER
Patient was last seen in the office on 7/8/2021, I requested clobazam level to be drawn 2 weeks after changes in dose of medication  I would like to have at least the clobazam and ammonia levels drawn sooner than prior to routine follow-up in October  She is very sedated, which could be from clobazam (especially with Epidiolex; higher clobazam levels can result from medication interaction)  Please request that the labs are completed sooner than prior to the visit with me  Is she due for any other blood draw? If she goes to the ED for any other issue (she was recently in hospital due to PEG tube malfunction), to request blood work for clobazam and ammonia levels too

## 2021-07-26 NOTE — TELEPHONE ENCOUNTER
Call received from [place] staff asking if labs could be drawn for routine monitoring prior to upcoming appt in October  Patient currently having no issues this is just routine being requested by director of nursing at facility      Orders faxed: [phone]  Attn: Ivon Montemayor

## 2021-07-27 NOTE — PHYSICIAN ADVISOR
Current patient class: Inpatient  The patient is currently on Hospital Day: 3 at [address]      The patient was admitted to the hospital at  4:30 PM on 7/19/21 for the following diagnosis:  Dislodged gastrostomy tube [Q38 384D]  Encounter for care related to feeding tube [Z46 [id number]]       There was documentation in the medical record of an expected length of stay of at least 2 midnights  The patient was therefore expected to satisfy the 2 midnight benchmark and given the 2 midnight presumption was appropriate for INPATIENT ADMISSION  Given this expectation of a satisfying stay, CMS instructs us that the patient is most often appropriate for inpatient admission under part A provided medical necessity is documented in the chart  After review of the relevant documentation, labs, vital signs and test results, the patient is appropriate for INPATIENT ADMISSION  Admission to the hospital as an inpatient is a complex decision making process which requires the practitioner to consider the patients presenting complaint, history and physical examination and all relevant testing  With this in mind, in this case, the patient was deemed appropriate for INPATIENT ADMISSION  After review of the documentation and testing available at the time of the admission, I concur with this clinical determination of medical necessity  Rationale is as follows: The patient presented to [place] on  July 18, 2021  Observation class was ordered early on 19th and she was changed to an inpatient admission later that day  Surgery was unable to reinsert the gastrostomy tube  Gastroenterology was consulted and case discussed with them  The following was copied from the progress note on July 20th:    Patient has a history of seizure disorder, on 6 standing and 1 prn anti epileptic medication    She has also had a history of multiple prior hospitalizations secondary to status epilepticus, and remains at high risk for seizure activity  Despite change over to IV AEDs, the patient remains only partially covered for her underlying seizure disorder  Discussed with GI at length - she remains at high risk for procedure today given the possibility that her gastrostomy tract may be closed - however, given her high risk for seizure activity, and lack of full time availability of IR Neurology, recommendation was still made for transfer  Patient was accepted by Dr Jody Huynh of [facility] in transfer to [facility]  The patient remained hospitalized at [facility] for two additional days  In-patient class was appropriate given the anticipated length of stay along with unexpected need for transfer in network        The patients vitals on arrival were   ED Triage Vitals   Temperature Pulse Respirations Blood Pressure SpO2   07/18/21 1939 07/18/21 1939 07/18/21 1939 07/18/21 1939 07/18/21 1939   (!) 97 4 °F (36 3 °C) 84 18 104/66 96 %      Temp Source Heart Rate Source Patient Position - Orthostatic VS BP Location FiO2 (%)   07/19/21 0038 07/18/21 1939 07/18/21 1939 07/18/21 1939 --   Tympanic Monitor Lying Right arm       Pain Score       07/18/21 1939       No Pain           Past Medical History:   Diagnosis Date    ADHD     Anoxic brain damage ([facility] )     Autistic disorder     Breakthrough seizure ([facility] ) 8/1/2019    Dysphagia     Dysphagia, oropharyngeal phase     Gastrostomy tube dependent (HCC)     14 Fr as of 05/19/2020    Hyperammonemia (HCC)     Hyperkeratosis     Hypotension     Intellectual disability     Lennox-Gastaut syndrome with tonic seizures (HCC)     Lethargy     Liver enzyme elevation     Onychomycosis     Osteoporosis     Osteoporosis      Past Surgical History:   Procedure Laterality Date    ABDOMINAL SURGERY      CARDIAC PACEMAKER PLACEMENT      vns implant l chest    IR GASTROSTOMY TUBE PLACEMENT  11/21/2019    IR THORACENTESIS  12/17/2018    JEJUNOSTOMY FEEDING TUBE      history of - most recently, PEG tube    PEG TUBE PLACEMENT      VA IMP STIM,CRANIAL,SUBQ,1 ARRAY Left 12/18/2019    Procedure: REPLACEMENT IMPLANTABLE PULSE GENERATOR FOR VAGAL NERVE STIMULATOR, LEFT CHEST;  Surgeon: Nguyen Blue MD;  Location: [facility];  Service: Neurosurgery           Consults have been placed to:   IP CONSULT TO ACUTE CARE SURGERY  IP CONSULT TO GASTROENTEROLOGY    Vitals:    07/21/21 2233 07/21/21 2234 07/21/21 2234 07/22/21 0722   BP: 145/73 145/73 145/73 152/72   Pulse: (!) 52  (!) 49 68   Resp: 18 18  14   Temp: 97 9 °F (36 6 °C) 97 9 °F (36 6 °C) 97 9 °F (36 6 °C)    TempSrc:       SpO2: 100%  99% 97%   Weight:       Height:           Most recent labs:    No results for input(s): WBC, HGB, HCT, PLT, K, NA, CALCIUM, BUN, CREATININE, LIPASE, AMYLASE, INR, TROPONINI, CKTOTAL, AST, ALT, ALKPHOS, BILITOT in the last 72 hours  Scheduled Meds:  Continuous Infusions:No current facility-administered medications for this encounter  PRN Meds:      Surgical procedures (if appropriate):

## 2021-08-16 ENCOUNTER — HOSPITAL ENCOUNTER (OUTPATIENT)
Facility: HOSPITAL | Age: 41
Setting detail: OBSERVATION
Discharge: NURSING, REHAB OR LONG-TERM CARE | End: 2021-08-17
Attending: EMERGENCY MEDICINE | Admitting: INTERNAL MEDICINE
Payer: MEDICARE

## 2021-08-16 ENCOUNTER — APPOINTMENT (EMERGENCY)
Dept: CT IMAGING | Facility: HOSPITAL | Age: 41
End: 2021-08-16
Payer: MEDICARE

## 2021-08-16 DIAGNOSIS — K94.23 PEG TUBE MALFUNCTION (HCC): Primary | ICD-10-CM

## 2021-08-16 DIAGNOSIS — S31.109A OPEN ABDOMINAL WALL WOUND, INITIAL ENCOUNTER: ICD-10-CM

## 2021-08-16 DIAGNOSIS — Z46.59 ENCOUNTER FOR CARE RELATED TO FEEDING TUBE: ICD-10-CM

## 2021-08-16 DIAGNOSIS — R23.8 SKIN BREAKDOWN: ICD-10-CM

## 2021-08-16 LAB
ANION GAP SERPL CALCULATED.3IONS-SCNC: 11 MMOL/L (ref 4–13)
BASOPHILS # BLD AUTO: 0.06 THOUSANDS/ΜL (ref 0–0.1)
BASOPHILS NFR BLD AUTO: 1 % (ref 0–1)
BUN SERPL-MCNC: 13 MG/DL (ref 5–25)
CALCIUM SERPL-MCNC: 9.7 MG/DL (ref 8.3–10.1)
CHLORIDE SERPL-SCNC: 108 MMOL/L (ref 100–108)
CO2 SERPL-SCNC: 21 MMOL/L (ref 21–32)
CREAT SERPL-MCNC: 0.53 MG/DL (ref 0.6–1.3)
EOSINOPHIL # BLD AUTO: 0.37 THOUSAND/ΜL (ref 0–0.61)
EOSINOPHIL NFR BLD AUTO: 3 % (ref 0–6)
ERYTHROCYTE [DISTWIDTH] IN BLOOD BY AUTOMATED COUNT: 12.5 % (ref 11.6–15.1)
GFR SERPL CREATININE-BSD FRML MDRD: 119 ML/MIN/1.73SQ M
GLUCOSE SERPL-MCNC: 94 MG/DL (ref 65–140)
HCT VFR BLD AUTO: 38.9 % (ref 34.8–46.1)
HGB BLD-MCNC: 12.4 G/DL (ref 11.5–15.4)
IMM GRANULOCYTES # BLD AUTO: 0.03 THOUSAND/UL (ref 0–0.2)
IMM GRANULOCYTES NFR BLD AUTO: 0 % (ref 0–2)
LACTATE SERPL-SCNC: 0.8 MMOL/L (ref 0.5–2)
LYMPHOCYTES # BLD AUTO: 2.13 THOUSANDS/ΜL (ref 0.6–4.47)
LYMPHOCYTES NFR BLD AUTO: 19 % (ref 14–44)
MCH RBC QN AUTO: 31.7 PG (ref 26.8–34.3)
MCHC RBC AUTO-ENTMCNC: 31.9 G/DL (ref 31.4–37.4)
MCV RBC AUTO: 100 FL (ref 82–98)
MONOCYTES # BLD AUTO: 0.95 THOUSAND/ΜL (ref 0.17–1.22)
MONOCYTES NFR BLD AUTO: 8 % (ref 4–12)
NEUTROPHILS # BLD AUTO: 7.95 THOUSANDS/ΜL (ref 1.85–7.62)
NEUTS SEG NFR BLD AUTO: 69 % (ref 43–75)
NRBC BLD AUTO-RTO: 0 /100 WBCS
PLATELET # BLD AUTO: 171 THOUSANDS/UL (ref 149–390)
PMV BLD AUTO: 11.9 FL (ref 8.9–12.7)
POTASSIUM SERPL-SCNC: 4 MMOL/L (ref 3.5–5.3)
RBC # BLD AUTO: 3.91 MILLION/UL (ref 3.81–5.12)
SODIUM SERPL-SCNC: 140 MMOL/L (ref 136–145)
WBC # BLD AUTO: 11.49 THOUSAND/UL (ref 4.31–10.16)

## 2021-08-16 PROCEDURE — 83605 ASSAY OF LACTIC ACID: CPT | Performed by: EMERGENCY MEDICINE

## 2021-08-16 PROCEDURE — 74176 CT ABD & PELVIS W/O CONTRAST: CPT

## 2021-08-16 PROCEDURE — 96365 THER/PROPH/DIAG IV INF INIT: CPT

## 2021-08-16 PROCEDURE — 99285 EMERGENCY DEPT VISIT HI MDM: CPT | Performed by: EMERGENCY MEDICINE

## 2021-08-16 PROCEDURE — 99219 PR INITIAL OBSERVATION CARE/DAY 50 MINUTES: CPT | Performed by: PHYSICIAN ASSISTANT

## 2021-08-16 PROCEDURE — 80048 BASIC METABOLIC PNL TOTAL CA: CPT | Performed by: EMERGENCY MEDICINE

## 2021-08-16 PROCEDURE — 99214 OFFICE O/P EST MOD 30 MIN: CPT | Performed by: SURGERY

## 2021-08-16 PROCEDURE — 99285 EMERGENCY DEPT VISIT HI MDM: CPT

## 2021-08-16 PROCEDURE — 36415 COLL VENOUS BLD VENIPUNCTURE: CPT | Performed by: EMERGENCY MEDICINE

## 2021-08-16 PROCEDURE — 85025 COMPLETE CBC W/AUTO DIFF WBC: CPT | Performed by: EMERGENCY MEDICINE

## 2021-08-16 RX ORDER — BISACODYL 10 MG
10 SUPPOSITORY, RECTAL RECTAL
Status: DISCONTINUED | OUTPATIENT
Start: 2021-08-16 | End: 2021-08-17 | Stop reason: HOSPADM

## 2021-08-16 RX ORDER — CEFAZOLIN SODIUM 2 G/50ML
2000 SOLUTION INTRAVENOUS EVERY 8 HOURS
Status: CANCELLED | OUTPATIENT
Start: 2021-08-16

## 2021-08-16 RX ORDER — LACOSAMIDE 50 MG/1
50 TABLET ORAL EVERY 12 HOURS SCHEDULED
Status: DISCONTINUED | OUTPATIENT
Start: 2021-08-16 | End: 2021-08-17 | Stop reason: HOSPADM

## 2021-08-16 RX ORDER — CEFAZOLIN SODIUM 2 G/50ML
2000 SOLUTION INTRAVENOUS ONCE
Status: COMPLETED | OUTPATIENT
Start: 2021-08-16 | End: 2021-08-16

## 2021-08-16 RX ORDER — DIAZEPAM 5 MG/1
5 TABLET ORAL ONCE AS NEEDED
Status: DISCONTINUED | OUTPATIENT
Start: 2021-08-16 | End: 2021-08-17 | Stop reason: HOSPADM

## 2021-08-16 RX ORDER — ACETAMINOPHEN 160 MG/5ML
650 SUSPENSION, ORAL (FINAL DOSE FORM) ORAL EVERY 6 HOURS PRN
Status: DISCONTINUED | OUTPATIENT
Start: 2021-08-16 | End: 2021-08-17 | Stop reason: HOSPADM

## 2021-08-16 RX ORDER — LANOLIN ALCOHOL/MO/W.PET/CERES
6 CREAM (GRAM) TOPICAL
Status: DISCONTINUED | OUTPATIENT
Start: 2021-08-16 | End: 2021-08-17 | Stop reason: HOSPADM

## 2021-08-16 RX ORDER — CLOBAZAM 10 MG/1
15 TABLET ORAL
Status: DISCONTINUED | OUTPATIENT
Start: 2021-08-16 | End: 2021-08-17 | Stop reason: HOSPADM

## 2021-08-16 RX ORDER — RUFINAMIDE 40 MG/ML
1600 SUSPENSION ORAL EVERY 12 HOURS
Status: DISCONTINUED | OUTPATIENT
Start: 2021-08-17 | End: 2021-08-17 | Stop reason: HOSPADM

## 2021-08-16 RX ADMIN — CLOBAZAM 15 MG: 10 TABLET ORAL at 22:53

## 2021-08-16 RX ADMIN — Medication 6 MG: at 22:53

## 2021-08-16 RX ADMIN — CEFAZOLIN SODIUM 2000 MG: 2 SOLUTION INTRAVENOUS at 13:37

## 2021-08-16 RX ADMIN — LACOSAMIDE 50 MG: 50 TABLET, FILM COATED ORAL at 22:53

## 2021-08-16 RX ADMIN — TOPIRAMATE 250 MG: 100 TABLET, FILM COATED ORAL at 22:53

## 2021-08-16 NOTE — LETTER
August 17, 2021     [facility]  Patient: Emilia Alfredo   YOB: 1980   Date of Visit: 8/16/2021       Dear Dr Major Reason:    Thank you for referring Mallory Rosa to me for evaluation  Below are the relevant portions of my H&P  If you have questions, please do not hesitate to call me  I look forward to following Michele Calzadanner along with you  Sincerely,        No name on file  CC: No Recipients  Summer Rivera, [place]  8/16/2021  5:27 PM  [address]  H&P- Emilia Alfredo 1980, 37 y o  female MRN: [id number]  Unit/Bed#: 425-01 Encounter: [id number]  Primary Care Provider: Sola Mills DO   Date and time admitted to hospital: 8/16/2021 11:45 AM    Low blood pressure  Assessment & Plan  This is a chronic issue for this patient and she typically runs low 31V systolic   Will provide with fluid bolus if blood pressure drops significantly    Somnolence  Assessment & Plan  Likely secondary to seizure medications  She is nonverbal at baseline  She will awaken to touch  Continue to monitor, but will not change any seizure medications at this time due to her need for them or else she will go into intractable seizures  Cerebral anoxic injury ([facility] [address] )  Assessment & Plan  Pt has cerebral palsy, is nonverbal at baseline    Underweight  Assessment & Plan  Maintain on Peg tube feeds with high protein    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[facility] [address] )  Assessment & Plan  Follows with Dr Conchita Sheets of neurology  Per his notes "continuous EEG monitoring frequent finds innumerable number of tonic seizures despite multiple medication trials and VNS therapy"  Recently saw him on 7/8 and she was started on Vimpat in addition to her max dose of topamax, Onfi, Epidiolex, Briviact, Banzel  onfi was changed from morning to night and increased from 5 mg to 15 mg     These are all given via PEG tube and any time she does not receive her meds she will go into intractable seizure activity  Currently she is stable and we have all meds in the pharmacy due to her frequent visits unfortunately we do not have her Epidiolex in the pharmacy and she does require a night time dose as her second dose  I have contacted [place] where she resides in order to obtain this ASAP and am awaiting further communication  * PEG tube malfunction [facility])  Assessment & Plan  Patient here with chronic PEG tube used for feedings and medications, absolutely NEEDS this to obtain her seizure medications otherwise she will have intractable seizures  PEG tube recently replaced four weeks ago  CT shows that it is still in place within the stomach  There is a wound surrounding the PEG tube site that is weeping but does not look infectious at this time although pt received one time dose of Ancef in the ED prophylactically  Cannot remove and replace at a later time, needs to be maintained and managed with wound care   Surgery consulted, appreciate input, they packed it and moved the bridge to better manage placement and wound was covered with gauze, maintain this for now  Will need to follow up with wound care when discharged  Maintain PEG feedings  Mits in place due to patient scratching at the site    VTE Pharmacologic Prophylaxis: VTE Score: 3 Moderate Risk (Score 3-4) - Pharmacological DVT Prophylaxis Ordered: enoxaparin (Lovenox)  Code Status: Prior   Discussion with family: Attempted to update  (father) via phone  Unable to contact  Anticipated Length of Stay: Patient will be admitted on an observation basis with an anticipated length of stay of less than 2 midnights secondary to wound management  Total Time for Visit, including Counseling / Coordination of Care: 30 minutes Greater than 50% of this total time spent on direct patient counseling and coordination of care      Chief Complaint: PEG tube dysfunction    History of Present Illness:  Fatmata Caba is a 37 y o  female with a PMH of anoxic brain injury, Jez-Gastaut with intractable epilepsy, hypotension, and is nonverbal at baseline who presents with PEG tube wound  Patient resides at [address] [place] [address] and was found to have a wound surrounding her PEG tube which was recently replaced at [facility] by GI 4 weeks ago  Surgery saw the patient and deemed she needs replacement at this time however patient is unable to have this removed because if she does nto receive her seizure medications via the PEG tube she goes into status epilepticus  Patient is afebrile and NSR at this time and there is no signfiicant concern for infection of the site  Surgery agreed to keep the PEG in and wound care will take care of it  Pt is nonverbal but does often try to scratch at the site and she requires mits for this reason      Review of Systems:  Review of Systems   Unable to perform ROS: Patient nonverbal       Past Medical and Surgical History:   Past Medical History:   Diagnosis Date    ADHD     Anoxic brain damage ([facility] [address] )     Autistic disorder     Breakthrough seizure ([facility] ) 8/1/2019    Dysphagia     Dysphagia, oropharyngeal phase     Gastrostomy tube dependent ([facility] [address] )     14 Fr as of 05/19/2020    Hyperammonemia (HCC)     Hyperkeratosis     Hypotension     Intellectual disability     Lennox-Gastaut syndrome with tonic seizures ([facility] [address] )     Lethargy     Liver enzyme elevation     Onychomycosis     Osteoporosis     Osteoporosis        Past Surgical History:   Procedure Laterality Date    ABDOMINAL SURGERY      CARDIAC PACEMAKER PLACEMENT      vns implant l chest    IR GASTROSTOMY TUBE PLACEMENT  11/21/2019    IR THORACENTESIS  12/17/2018    JEJUNOSTOMY FEEDING TUBE      history of - most recently, PEG tube    PEG TUBE PLACEMENT      [place] IMP STIM,CRANIAL,SUBQ,1 ARRAY Left 12/18/2019    Procedure: REPLACEMENT IMPLANTABLE PULSE GENERATOR FOR VAGAL NERVE STIMULATOR, LEFT CHEST;  Surgeon: Juli Bell MD;  [facility];  Service: Neurosurgery Meds/Allergies:  Prior to Admission medications    Medication Sig Start Date End Date Taking? Authorizing Provider   Brivaracetam (Briviact) 10 MG/ML SOLN oral solution 5 mL (50 mg total) by Per PEG Tube route every 12 (twelve) hours 7/8/21  Yes Mary Carvalho MD   cannabidiol (Epidiolex) 100 mg/ml SOLN 4 5 mL (450 mg total) by Per G Tube route 2 (two) times a day 7/8/21  Yes Mary Carvalho MD   cloBAZam (ONFI) 10 MG tablet Give one and a half tab at bedtime by G-Tube 7/8/21  Yes Mary Carvalho MD   lacosamide (Vimpat) 10 mg/mL Give by G-tube, give 5mL every 12 hours  7/8/21  Yes Mary Carvalho MD   melatonin 3 mg 6 mg by Per G Tube route daily at bedtime   Yes Historical Provider, MD   Multiple Vitamins-Minerals (MULTIVITAMIN WITH IRON-MINERALS) liquid 5 mL by Per G Tube route daily   Yes Historical Provider, MD   Probiotic Product (ALEXANDER-BID PROBIOTIC PO) 1 tablet by Per G Tube route daily     Yes Historical Provider, MD   rufinamide (BANZEL) 400 mg tablet 4 tablets (1,600 mg total) by Per G Tube route every 12 (twelve) hours 7/8/21  Yes Mary Carvalho MD   topiramate (TOPAMAX) 50 MG tablet 5 tablets (250 mg total) by Per G Tube route every 12 (twelve) hours 7/8/21  Yes Mary Carvalho MD   acetaminophen (TYLENOL) 325 mg tablet Take 650 mg by mouth every 6 (six) hours as needed for mild pain or fever    Historical Provider, MD   bisacodyl (BISCOLAX) 10 mg suppository Insert 10 mg into the rectum daily at bedtime as needed for constipation (if no BM day #4)    Historical Provider, MD   bisacodyl (FLEET BISACODYL) 10 MG/30ML ENEM Insert 10 mg into the rectum as needed for constipation Give at hs on day 5 if suppository is not effective    Historical Provider, MD   diazepam (VALIUM) 5 MG/ML solution If the patient has a seizure lasting more than 3 minutes then give 1mL by PEG tube, may repeat 1mL if she continues to have seizure for more than 10 minutes   7/12/21   Mary Carvalho MD   magnesium hydroxide (MILK OF MAGNESIA) 400 mg/5 mL oral suspension Take 30 mL by mouth daily as needed for constipation If not BM by day 3    Historical Provider, MD     I have reviewed home medications using recent Epic encounter  Allergies: Allergies   Allergen Reactions    Felbamate        Social History:  Marital Status: Single   Occupation: none  Patient Pre-hospital Living Situation: Skilled Nursing Facility: [facility]  Patient Pre-hospital Level of Mobility: non-ambulatory/bed bound  Patient Pre-hospital Diet Restrictions: PEG tube feeds  Substance Use History:   Social History     Substance and Sexual Activity   Alcohol Use Not Currently     Social History     Tobacco Use   Smoking Status Never Smoker   Smokeless Tobacco Never Used     Social History     Substance and Sexual Activity   Drug Use No       Family History:  History reviewed  No pertinent family history  Physical Exam:     Vitals:   Blood Pressure: (!) 89/58 (08/16/21 1651)  Pulse: 85 (08/16/21 1651)  Temperature: 99 2 °F (37 3 °C) (08/16/21 1148)  Respirations: 17 (08/16/21 1651)  Height: 5' (152 4 cm) (08/16/21 1651)  Weight - Scale: 44 8 kg (98 lb 12 3 oz) (08/16/21 1651)  SpO2: 98 % (08/16/21 1651)    Physical Exam  Vitals reviewed  Constitutional:       General: She is not in acute distress  Comments: Awakens to touch, cachectic and underweight   HENT:      Head: Normocephalic and atraumatic  Cardiovascular:      Rate and Rhythm: Normal rate and regular rhythm  Pulses: Normal pulses  Heart sounds: Normal heart sounds  Pulmonary:      Effort: Pulmonary effort is normal       Breath sounds: Normal breath sounds  Abdominal:      General: Bowel sounds are normal       Tenderness: There is no guarding or rebound  Comments: PEG tube in place of the left abdomen with prior PEG tube site noted   Open wounds present surrounding PEG tube site    Musculoskeletal:      Comments: Muscle contractures present of bilateral arms   Skin: General: Skin is warm  Neurological:      Comments: Unable to determine due to nonverbal status   Psychiatric:      Comments: Not agitated at this time         Additional Data:     Lab Results:  Results from last 7 days   Lab Units 08/16/21  1233   WBC Thousand/uL 11 49*   HEMOGLOBIN g/dL 12 4   HEMATOCRIT % 38 9   PLATELETS Thousands/uL 171   NEUTROS PCT % 69   LYMPHS PCT % 19   MONOS PCT % 8   EOS PCT % 3     Results from last 7 days   Lab Units 08/16/21  1232   SODIUM mmol/L 140   POTASSIUM mmol/L 4 0   CHLORIDE mmol/L 108   CO2 mmol/L 21   BUN mg/dL 13   CREATININE mg/dL 0 53*   ANION GAP mmol/L 11   CALCIUM mg/dL 9 7   GLUCOSE RANDOM mg/dL 94                 Results from last 7 days   Lab Units 08/16/21  1232   LACTIC ACID mmol/L 0 8       Imaging: Reviewed radiology reports from this admission including: abdominal/pelvic CT  CT abdomen pelvis wo contrast   Final Result by Yun Ansari MD (08/16 4241)         1  PEG tube tip/balloon is within the stomach, just lateral to a band of soft tissue in the anterior abdominal wall correlating to the history of prior to PEG tube revision  Workstation performed: [id number]             EKG and Other Studies Reviewed on Admission:   · EKG: NSR  HR 78     ** Please Note: This note has been constructed using a voice recognition system   **

## 2021-08-16 NOTE — H&P
[facility] [address] [place]  H&P- Brittany Guerra 1980, 37 y o  female MRN: [id number]  Unit/Bed#: 425-01 Encounter: [id number]  Primary Care Provider: Amanda Manuel DO   Date and time admitted to hospital: 8/16/2021 11:45 AM    Low blood pressure  Assessment & Plan  This is a chronic issue for this patient and she typically runs low 93C systolic   Will provide with fluid bolus if blood pressure drops significantly    Somnolence  Assessment & Plan  Likely secondary to seizure medications  She is nonverbal at baseline  She will awaken to touch  Continue to monitor, but will not change any seizure medications at this time due to her need for them or else she will go into intractable seizures  Cerebral anoxic injury ([facility] [address] )  Assessment & Plan  Pt has cerebral palsy, is nonverbal at baseline    Underweight  Assessment & Plan  Maintain on Peg tube feeds with high protein    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[facility] [address] )  Assessment & Plan  Follows with Dr Isa Vines of neurology  Per his notes "continuous EEG monitoring frequent finds innumerable number of tonic seizures despite multiple medication trials and VNS therapy"  Recently saw him on 7/8 and she was started on Vimpat in addition to her max dose of topamax, Onfi, Epidiolex, Briviact, Banzel  onfi was changed from morning to night and increased from 5 mg to 15 mg  These are all given via PEG tube and any time she does not receive her meds she will go into intractable seizure activity  Currently she is stable and we have all meds in the pharmacy due to her frequent visits unfortunately we do not have her Epidiolex in the pharmacy and she does require a night time dose as her second dose  I have contacted [place] where she resides in order to obtain this ASAP and am awaiting further communication       * PEG tube malfunction [facility])  Assessment & Plan  Patient here with chronic PEG tube used for feedings and medications, absolutely NEEDS this to obtain her seizure medications otherwise she will have intractable seizures  PEG tube recently replaced four weeks ago  CT shows that it is still in place within the stomach  There is a wound surrounding the PEG tube site that is weeping but does not look infectious at this time although pt received one time dose of Ancef in the ED prophylactically  Cannot remove and replace at a later time, needs to be maintained and managed with wound care   Surgery consulted, appreciate input, they packed it and moved the bridge to better manage placement and wound was covered with gauze, maintain this for now  Will need to follow up with wound care when discharged  Maintain PEG feedings  Mits in place due to patient scratching at the site    VTE Pharmacologic Prophylaxis: VTE Score: 3 Moderate Risk (Score 3-4) - Pharmacological DVT Prophylaxis Ordered: enoxaparin (Lovenox)  Code Status: Prior   Discussion with family: Attempted to update  (father) via phone  Unable to contact  Anticipated Length of Stay: Patient will be admitted on an observation basis with an anticipated length of stay of less than 2 midnights secondary to wound management  Total Time for Visit, including Counseling / Coordination of Care: 30 minutes Greater than 50% of this total time spent on direct patient counseling and coordination of care  Chief Complaint: PEG tube dysfunction    History of Present Illness:  Minoo Mckay is a 37 y o  female with a PMH of anoxic brain injury, Rankin-Gastaut with intractable epilepsy, hypotension, and is nonverbal at baseline who presents with PEG tube wound  Patient resides at [facility] and was found to have a wound surrounding her PEG tube which was recently replaced at [facility] by GI 4 weeks ago   Surgery saw the patient and deemed she needs replacement at this time however patient is unable to have this removed because if she does nto receive her seizure medications via the PEG tube she goes into status epilepticus  Patient is afebrile and NSR at this time and there is no signfiicant concern for infection of the site  Surgery agreed to keep the PEG in and wound care will take care of it  Pt is nonverbal but does often try to scratch at the site and she requires mits for this reason  Review of Systems:  Review of Systems   Unable to perform ROS: Patient nonverbal       Past Medical and Surgical History:   Past Medical History:   Diagnosis Date    ADHD     Anoxic brain damage ([facility] [address] )     Autistic disorder     [facility] 8/1/2019    Dysphagia     Dysphagia, oropharyngeal phase     Gastrostomy tube dependent ([facility] [address] )     14 Fr as of 05/19/2020    Hyperammonemia (HCC)     Hyperkeratosis     Hypotension     Intellectual disability     Lennox-Gastaut syndrome with tonic seizures ([facility] [address] )     Lethargy     Liver enzyme elevation     Onychomycosis     Osteoporosis     Osteoporosis        Past Surgical History:   Procedure Laterality Date    ABDOMINAL SURGERY      CARDIAC PACEMAKER PLACEMENT      vns implant l chest    IR GASTROSTOMY TUBE PLACEMENT  11/21/2019    IR THORACENTESIS  12/17/2018    JEJUNOSTOMY FEEDING TUBE      history of - most recently, PEG tube    PEG TUBE PLACEMENT      [place] IMP STIM,CRANIAL,SUBQ,1 ARRAY Left 12/18/2019    Procedure: REPLACEMENT IMPLANTABLE PULSE GENERATOR FOR VAGAL NERVE STIMULATOR, LEFT CHEST;  Surgeon: Evelyn Caballero MD;  Location[facility];  Service: Neurosurgery       Meds/Allergies:  Prior to Admission medications    Medication Sig Start Date End Date Taking?  Authorizing Provider   Brivaracetam (Briviact) 10 MG/ML SOLN oral solution 5 mL (50 mg total) by Per PEG Tube route every 12 (twelve) hours 7/8/21  Yes Amrit Lau MD   cannabidiol (Epidiolex) 100 mg/ml SOLN 4 5 mL (450 mg total) by Per G Tube route 2 (two) times a day 7/8/21  Yes Amrit Lau MD   cloBAZam (ONFI) 10 MG tablet Give one and a half tab at bedtime by G-Tube 7/8/21  Yes Escobar Knox MD   lacosamide (Vimpat) 10 mg/mL Give by G-tube, give 5mL every 12 hours  7/8/21  Yes Escobar Knox MD   melatonin 3 mg 6 mg by Per G Tube route daily at bedtime   Yes Historical Provider, MD   Multiple Vitamins-Minerals (MULTIVITAMIN WITH IRON-MINERALS) liquid 5 mL by Per G Tube route daily   Yes Historical Provider, MD   Probiotic Product (ALEXANDER-BID PROBIOTIC PO) 1 tablet by Per G Tube route daily     Yes Historical Provider, MD   rufinamide (BANZEL) 400 mg tablet 4 tablets (1,600 mg total) by Per G Tube route every 12 (twelve) hours 7/8/21  Yes Escobar Knox MD   topiramate (TOPAMAX) 50 MG tablet 5 tablets (250 mg total) by Per G Tube route every 12 (twelve) hours 7/8/21  Yes Escobar Knox MD   acetaminophen (TYLENOL) 325 mg tablet Take 650 mg by mouth every 6 (six) hours as needed for mild pain or fever    Historical Provider, MD   bisacodyl (BISCOLAX) 10 mg suppository Insert 10 mg into the rectum daily at bedtime as needed for constipation (if no BM day #4)    Historical Provider, MD   bisacodyl (FLEET BISACODYL) 10 MG/30ML ENEM Insert 10 mg into the rectum as needed for constipation Give at hs on day 5 if suppository is not effective    Historical Provider, MD   diazepam (VALIUM) 5 MG/ML solution If the patient has a seizure lasting more than 3 minutes then give 1mL by PEG tube, may repeat 1mL if she continues to have seizure for more than 10 minutes  7/12/21   Escobar Knox MD   magnesium hydroxide (MILK OF MAGNESIA) 400 mg/5 mL oral suspension Take 30 mL by mouth daily as needed for constipation If not BM by day 3    Historical Provider, MD     I have reviewed home medications using recent Epic encounter  Allergies:    Allergies   Allergen Reactions    Felbamate        Social History:  Marital Status: Single   Occupation: none  Patient Pre-hospital Living Situation: [facility]: hometow  Patient Pre-hospital Level of Mobility: non-ambulatory/bed bound  Patient Pre-hospital Diet Restrictions: PEG tube feeds  Substance Use History:   Social History     Substance and Sexual Activity   Alcohol Use Not Currently     Social History     Tobacco Use   Smoking Status Never Smoker   Smokeless Tobacco Never Used     Social History     Substance and Sexual Activity   Drug Use No       Family History:  History reviewed  No pertinent family history  Physical Exam:     Vitals:   Blood Pressure: (!) 89/58 (08/16/21 1651)  Pulse: 85 (08/16/21 1651)  Temperature: 99 2 °F (37 3 °C) (08/16/21 1148)  Respirations: 17 (08/16/21 1651)  Height: 5' (152 4 cm) (08/16/21 1651)  Weight - Scale: 44 8 kg (98 lb 12 3 oz) (08/16/21 1651)  SpO2: 98 % (08/16/21 1651)    Physical Exam  Vitals reviewed  Constitutional:       General: She is not in acute distress  Comments: Awakens to touch, cachectic and underweight   HENT:      Head: Normocephalic and atraumatic  Cardiovascular:      Rate and Rhythm: Normal rate and regular rhythm  Pulses: Normal pulses  Heart sounds: Normal heart sounds  Pulmonary:      Effort: Pulmonary effort is normal       Breath sounds: Normal breath sounds  Abdominal:      General: Bowel sounds are normal       Tenderness: There is no guarding or rebound  Comments: PEG tube in place of the left abdomen with prior PEG tube site noted  Open wounds present surrounding PEG tube site    Musculoskeletal:      Comments: Muscle contractures present of bilateral arms   Skin:     General: Skin is warm     Neurological:      Comments: Unable to determine due to nonverbal status   Psychiatric:      Comments: Not agitated at this time         Additional Data:     Lab Results:  Results from last 7 days   Lab Units 08/16/21  1233   WBC Thousand/uL 11 49*   HEMOGLOBIN g/dL 12 4   HEMATOCRIT % 38 9   PLATELETS Thousands/uL 171   NEUTROS PCT % 69   LYMPHS PCT % 19   MONOS PCT % 8   EOS PCT % 3     Results from last 7 days   Lab Units 08/16/21  1232   SODIUM mmol/L 140 POTASSIUM mmol/L 4 0   CHLORIDE mmol/L 108   CO2 mmol/L 21   BUN mg/dL 13   CREATININE mg/dL 0 53*   ANION GAP mmol/L 11   CALCIUM mg/dL 9 7   GLUCOSE RANDOM mg/dL 94                 Results from last 7 days   Lab Units 08/16/21  1232   LACTIC ACID mmol/L 0 8       Imaging: Reviewed radiology reports from this admission including: abdominal/pelvic CT  CT abdomen pelvis wo contrast   Final Result by Juliet Zazueta MD (08/16 2545)         1  PEG tube tip/balloon is within the stomach, just lateral to a band of soft tissue in the anterior abdominal wall correlating to the history of prior to PEG tube revision  Workstation performed: [id number]             EKG and Other Studies Reviewed on Admission:   · EKG: NSR  HR 78     ** Please Note: This note has been constructed using a voice recognition system   **

## 2021-08-16 NOTE — CONSULTS
Consultation - General Surgery   Epifanio Kate 37 y o  female MRN: [id number]  Unit/Bed#: EI36 Encounter: [id number]    Assessment/Plan     Assessment:  PEG Tube Malfunction  - Patient presenting to the ED with an infection surrounding her PEG tube  Area measures 3cm x 1cm, is open, weeping, and has surrounding erythema  - Patient was recently admitted here and transferred to [address] [place] [place] [address] for insertion of new PEG tube (07/21/21) after tube became dislodged and fistula tract closed  - Unable to obtain much history as patient is nonverbal   - afebrile, VSS  - Leukocytosis, WBC  11 49  - Lactic normal at 0 8    Cellulitis     Epilepsy   - Patient dependent on PEG tube for administration of medication   Lennox-Gastaut Syndrome   Anoxic Brain Injury     Plan:  - recommend starting on Ancef for cellulitis   - CT ordered, will follow up results  - Due to infection of the surrounding area, once CT is read, we will plan on removing her PEG tube  Consult should be placed to IR or GI for new tube placement  - patient will need to be transitioned to IV equivalent of oral medications until new tube can be inserted  - monitor vitals, trend AM labs  - Medical management per primary team     History of Present Illness   History, ROS and PFSH unobtainable from any source due to: patient is nonverbal   HPI:  Epifanio Kate is a 37 y o  female who presents to [address] [place] [address] due to PEG tube malfunction  She has had several admissions in the past due to problems with her PEG tube  Her most recent admission in late July was due to her previous PEG tube becoming dislodged  She was transferred to [facility] during that hospitalization for close monitoring due to inability to administer seizure medications through PEG tube and lack or IV alternatives  In [facility] she received a new 21 [place] PEG tube which now appears infected with surrounding cellulitis       Inpatient consult to Acute Care Surgery  Consult performed by: Marylee Artist, PA-C  Consult ordered by: Marylee Artist, PA-C        Review of Systems   Unable to perform ROS: Patient nonverbal     Historical Information   Past Medical History:   Diagnosis Date    ADHD     [facility] )     Autistic disorder     Breakthrough seizure ([facility] ) 8/1/2019    Dysphagia     Dysphagia, oropharyngeal phase     Gastrostomy tube dependent ([facility] )     14 Fr as of 05/19/2020    Hyperammonemia (HCC)     Hyperkeratosis     Hypotension     Intellectual disability     Lennox-Gastaut syndrome with tonic seizures ([facility]     Lethargy     Liver enzyme elevation     Onychomycosis     Osteoporosis     Osteoporosis      Past Surgical History:   Procedure Laterality Date    ABDOMINAL SURGERY      CARDIAC PACEMAKER PLACEMENT      vns implant l chest    IR GASTROSTOMY TUBE PLACEMENT  11/21/2019    IR THORACENTESIS  12/17/2018    JEJUNOSTOMY FEEDING TUBE      history of - most recently, PEG tube    PEG TUBE PLACEMENT      GA IMP STIM,CRANIAL,SUBQ,1 ARRAY Left 12/18/2019    Procedure: REPLACEMENT IMPLANTABLE PULSE GENERATOR FOR VAGAL NERVE STIMULATOR, LEFT CHEST;  Surgeon: Yanique Sofia MD;  Location: [facility];  Service: Neurosurgery     Social History   Social History     Substance and Sexual Activity   Alcohol Use Not Currently     Social History     Substance and Sexual Activity   Drug Use No     E-Cigarette/Vaping    E-Cigarette Use Never User      E-Cigarette/Vaping Substances    Nicotine No     THC No     CBD No     Flavoring No     Other No     Unknown No      Social History     Tobacco Use   Smoking Status Never Smoker   Smokeless Tobacco Never Used     Family History: non-contributory    Meds/Allergies   all current active meds have been reviewed  Allergies   Allergen Reactions    Felbamate        Objective   First Vitals:   Blood Pressure: 92/62 (08/16/21 1148)  Pulse: 82 (08/16/21 1148)  Temperature: 99 2 °F (37 3 °C) (08/16/21 1148)  Respirations: 16 (08/16/21 1148)  Weight - Scale: 48 7 kg (107 lb 5 8 oz) (08/16/21 1148)  SpO2: 98 % (08/16/21 1148)    Current Vitals:   Blood Pressure: 92/62 (08/16/21 1148)  Pulse: 84 (08/16/21 1245)  Temperature: 99 2 °F (37 3 °C) (08/16/21 1148)  Respirations: 16 (08/16/21 1148)  Weight - Scale: 48 7 kg (107 lb 5 8 oz) (08/16/21 1148)  SpO2: 98 % (08/16/21 1245)    No intake or output data in the 24 hours ending 08/16/21 1332    Invasive Devices     Peripheral Intravenous Line            Peripheral IV 08/16/21 Right Hand <1 day          Drain            Gastrostomy/Enterostomy Gastrostomy 14 Fr   days    Gastrostomy/Enterostomy 25 days                Physical Exam  Vitals reviewed  Constitutional:       General: She is not in acute distress  HENT:      Head: Normocephalic and atraumatic  Nose: Nose normal       Mouth/Throat:      Mouth: Mucous membranes are moist    Eyes:      General: No scleral icterus  Conjunctiva/sclera: Conjunctivae normal       Pupils: Pupils are equal, round, and reactive to light  Cardiovascular:      Rate and Rhythm: Normal rate and regular rhythm  Pulses: Normal pulses  Heart sounds: Normal heart sounds  No murmur heard  Pulmonary:      Effort: Pulmonary effort is normal       Breath sounds: Normal breath sounds  No wheezing, rhonchi or rales  Abdominal:      General: Bowel sounds are normal  There is no distension  Palpations: Abdomen is soft  Tenderness: There is no guarding or rebound  Comments: PEG tube in place with surrounding erythema  Please refer to photo below  Skin:     General: Skin is warm and dry  Coloration: Skin is not pale  Findings: Erythema (abdominal wound) present  Neurological:      Mental Status: Mental status is at baseline        Comments: Chronic contractures   Psychiatric:      Comments: nonverbal           PEG tube with surrounding skin infection and erythema, Previous PEG site noted          Lab Results:   I have personally reviewed pertinent lab results  , CBC:   Lab Results   Component Value Date    WBC 11 49 (H) 08/16/2021    HGB 12 4 08/16/2021    HCT 38 9 08/16/2021     (H) 08/16/2021     08/16/2021    MCH 31 7 08/16/2021    MCHC 31 9 08/16/2021    RDW 12 5 08/16/2021    MPV 11 9 08/16/2021    NRBC 0 08/16/2021   , CMP:   Lab Results   Component Value Date    SODIUM 140 08/16/2021    K 4 0 08/16/2021     08/16/2021    CO2 21 08/16/2021    BUN 13 08/16/2021    CREATININE 0 53 (L) 08/16/2021    CALCIUM 9 7 08/16/2021    EGFR 119 08/16/2021     Imaging: CT abd/pelvis ordered to confirm location of G tube, will follow up results  EKG, Pathology, and Other Studies: I have personally reviewed pertinent reports  Counseling / Coordination of Care  Total floor / unit time spent today 30 minutes  Greater than 50% of total time was spent with the patient and / or family counseling and / or coordination of care        Leslee Aleman PA-C  08/16/21

## 2021-08-16 NOTE — ASSESSMENT & PLAN NOTE
Patient here with chronic PEG tube used for feedings and medications, absolutely NEEDS this to obtain her seizure medications otherwise she will have intractable seizures  PEG tube recently replaced four weeks ago  CT shows that it is still in place within the stomach  There is a wound surrounding the PEG tube site that is weeping but does not look infectious at this time although pt received one time dose of Ancef in the ED prophylactically  Cannot remove and replace at a later time, needs to be maintained and managed with wound care   Surgery consulted, appreciate input, they packed it and moved the bridge to better manage placement and wound was covered with gauze, maintain this for now  Will need to follow up with wound care when discharged  Maintain PEG feedings  Mits in place due to patient scratching at the site

## 2021-08-16 NOTE — ASSESSMENT & PLAN NOTE
Follows with Dr Alireza Coppola of neurology  Per his notes "continuous EEG monitoring frequent finds innumerable number of tonic seizures despite multiple medication trials and VNS therapy"  Recently saw him on 7/8 and she was started on Vimpat in addition to her max dose of topamax, Onfi, Epidiolex, Briviact, Banzel  onfi was changed from morning to night and increased from 5 mg to 15 mg  These are all given via PEG tube and any time she does not receive her meds she will go into intractable seizure activity  Currently she is stable and we have all meds in the pharmacy due to her frequent visits unfortunately we do not have her Epidiolex in the pharmacy and she does require a night time dose as her second dose  I have contacted [place] where she resides in order to obtain this ASAP and am awaiting further communication

## 2021-08-16 NOTE — ASSESSMENT & PLAN NOTE
This is a chronic issue for this patient and she typically runs low 47O systolic   Will provide with fluid bolus if blood pressure drops significantly

## 2021-08-16 NOTE — ED PROVIDER NOTES
Final Diagnosis:  1  PEG tube malfunction ([facility] )    2  Encounter for care related to feeding tube    3  Open abdominal wall wound, initial encounter        Chief Complaint   Patient presents with    Feeding Tube Problem     pt sent in for possible infection of PEG tube  site is hot to the touch, red with drainage  HPI  Patient presents for evaluation of PEG tube  Patient nonverbal at baseline provides no history  Patient was set in by facility for concern of infection around PEG tube site  No other history is provided  Unless otherwise specified:  - No language barrier    - History obtained from patient  - There are no limitations to the history obtained  - Previous charting was reviewed    PMH:   has a past medical history of ADHD, Anoxic brain damage ([facility] ), Autistic disorder, Breakthrough seizure ([facility] ) (8/1/2019), Dysphagia, Dysphagia, oropharyngeal phase, Gastrostomy tube dependent ([facility] ), Hyperammonemia ([facility] ), Hyperkeratosis, Hypotension, Intellectual disability, Lennox-Gastaut syndrome with tonic seizures ([facility] ), Lethargy, Liver enzyme elevation, Onychomycosis, Osteoporosis, and Osteoporosis  PSH:   has a past surgical history that includes Jejunostomy feeding tube; PEG tube placement; Abdominal surgery; IR thoracentesis (12/17/2018); Cardiac pacemaker placement; IR gastrostomy tube placement (11/21/2019); and pr imp stim,cranial,subq,1 array (Left, 12/18/2019)  ROS:  Review of Systems   -   - 13 point ROS was performed and all are normal unless stated in the history above  - Nursing note reviewed  Vitals reviewed  - Orders placed by myself and/or advanced practitioner / resident  PE:   Vitals:    08/16/21 1245 08/16/21 1330 08/16/21 1400 08/16/21 1651   BP:  95/60 (!) 85/57 (!) 89/58   BP Location:   Left arm    Pulse: 84 73 78 85   Resp:   16 17   Temp:       SpO2: 98% 98% 99% 98%   Weight:         Vitals reviewed by me  Patient is nonverbal and does not follow commands  Will intermittently move extremities independently  Mostly to push away any attempts at examination  No obvious signs of trauma  Abdomen is soft  Peg tube as shown below          Unless otherwise specified above:    General: VS reviewed  Appears in NAD       Head: Normocephalic, atraumatic  Eyes: EOM-I  No exudate  ENT: Atraumatic external nose and ears  No malocclusion  No stridor  Normal phonation  No drooling  Normal swallowing  Neck: No JVD  CV: No pallor noted  Lungs:   No tachypnea  No respiratory distress    Abdomen:  Soft, non-tender, non-distended    MSK:   FROM spontaneously  No obvious deformity    Skin: Dry, intact  No obvious rash  Neuro: Motor grossly intact   Exam: deferred    Physical Exam     A:  - Nursing note reviewed  ED Course as of Aug 16 1656   Mon Aug 16, 2021   1303 Discussed patient with Interventional Radiology as well as surgery  Advised to get a CT scan to help better evaluate the location of the peg tube  Patient will need the tube to be replaced most likely in an operative environment  Anticipate admission      1403 Discussed the patient with Gastroenterology  They suggest holding off on removal of tube until CT scan is performed  CT abdomen pelvis wo contrast   Final Result         1  PEG tube tip/balloon is within the stomach, just lateral to a band of soft tissue in the anterior abdominal wall correlating to the history of prior to PEG tube revision              Workstation performed: [id number]           Orders Placed This Encounter   Procedures    CT abdomen pelvis wo contrast    CBC and differential    Basic metabolic panel    Lactic acid    Inpatient consult to Acute Care Surgery    Place in Observation    Restraints non-violent     Labs Reviewed   CBC AND DIFFERENTIAL - Abnormal       Result Value Ref Range Status    WBC 11 49 (*) 4 31 - 10 16 Thousand/uL Final    RBC 3 91  3 81 - 5 12 Million/uL Final Hemoglobin 12 4  11 5 - 15 4 g/dL Final    Hematocrit 38 9  34 8 - 46 1 % Final     (*) 82 - 98 fL Final    MCH 31 7  26 8 - 34 3 pg Final    MCHC 31 9  31 4 - 37 4 g/dL Final    RDW 12 5  11 6 - 15 1 % Final    MPV 11 9  8 9 - 12 7 fL Final    Platelets 063  950 - 390 Thousands/uL Final    nRBC 0  /100 WBCs Final    Neutrophils Relative 69  43 - 75 % Final    Immat GRANS % 0  0 - 2 % Final    Lymphocytes Relative 19  14 - 44 % Final    Monocytes Relative 8  4 - 12 % Final    Eosinophils Relative 3  0 - 6 % Final    Basophils Relative 1  0 - 1 % Final    Neutrophils Absolute 7 95 (*) 1 85 - 7 62 Thousands/µL Final    Immature Grans Absolute 0 03  0 00 - 0 20 Thousand/uL Final    Lymphocytes Absolute 2 13  0 60 - 4 47 Thousands/µL Final    Monocytes Absolute 0 95  0 17 - 1 22 Thousand/µL Final    Eosinophils Absolute 0 37  0 00 - 0 61 Thousand/µL Final    Basophils Absolute 0 06  0 00 - 0 10 Thousands/µL Final   BASIC METABOLIC PANEL - Abnormal    Sodium 140  136 - 145 mmol/L Final    Potassium 4 0  3 5 - 5 3 mmol/L Final    Chloride 108  100 - 108 mmol/L Final    CO2 21  21 - 32 mmol/L Final    ANION GAP 11  4 - 13 mmol/L Final    BUN 13  5 - 25 mg/dL Final    Creatinine 0 53 (*) 0 60 - 1 30 mg/dL Final    Comment: Standardized to IDMS reference method    Glucose 94  65 - 140 mg/dL Final    Comment: If the patient is fasting, the ADA then defines impaired fasting glucose as > 100 mg/dL and diabetes as > or equal to 123 mg/dL  Specimen collection should occur prior to Sulfasalazine administration due to the potential for falsely depressed results  Specimen collection should occur prior to Sulfapyridine administration due to the potential for falsely elevated results      Calcium 9 7  8 3 - 10 1 mg/dL Final    eGFR 119  ml/min/1 73sq m Final    Narrative:     [place] guidelines for Chronic Kidney Disease (CKD):     Stage 1 with normal or high GFR (GFR > 90 mL/min/1 73 square meters)    Stage 2 Mild CKD (GFR = 60-89 mL/min/1 73 square meters)    Stage 3A Moderate CKD (GFR = 45-59 mL/min/1 73 square meters)    Stage 3B Moderate CKD (GFR = 30-44 mL/min/1 73 square meters)    Stage 4 Severe CKD (GFR = 15-29 mL/min/1 73 square meters)    Stage 5 End Stage CKD (GFR <15 mL/min/1 73 square meters)  Note: GFR calculation is accurate only with a steady state creatinine   LACTIC ACID, PLASMA - Normal    LACTIC ACID 0 8  0 5 - 2 0 mmol/L Final    Narrative:     Result may be elevated if tourniquet was used during collection  Final Diagnosis:  1  PEG tube malfunction ([facility] )    2  Encounter for care related to feeding tube    3  Open abdominal wall wound, initial encounter        P:  - patient with a complex medical history and requires multiple anti lactic medications  The patient was reviewed with hospitalist team, Gastroenterology, Interventional Radiology, as well General surgery  They agreed upon plan was that the patient would keep her current tube and that general surgery would readjust the current equipment  The patient will receive wound care for the following weeks and then once the tract has matured it will be removed and re-evaluated  Possible new trach may be needed  Patient was admitted under observation status in order to fully evaluate the current wound as well as to ensure wound care was established moving forward      Medications   ceFAZolin (ANCEF) IVPB (premix in dextrose) 2,000 mg 50 mL (0 mg Intravenous Stopped 8/16/21 2977)     Time reflects when diagnosis was documented in both MDM as applicable and the Disposition within this note     Time User Action Codes Description Comment    8/16/2021  1:30 PM Sybil Harkins [K94 [id number]] PEG tube malfunction ([facility] )     8/16/2021  4:13 PM Francesca Brian Add [[id number]] Encounter for care related to feeding tube     8/16/2021  4:13 PM Evelyn Traore Add [[id number]] Open abdominal wall wound, initial encounter       ED Disposition     ED Disposition Condition Date/Time Comment    Admit Stable Mon Aug 16, 2021  4:13 PM Case was discussed with FERNANDO and the patient's admission status was agreed to be Admission Status: observation status to the service of Dr Galen Veras   Follow-up Information    None       Current Discharge Medication List      CONTINUE these medications which have NOT CHANGED    Details   Brivaracetam (Briviact) 10 MG/ML SOLN oral solution 5 mL (50 mg total) by Per PEG Tube route every 12 (twelve) hours  Qty: 300 mL, Refills: 5    Comments: Please discontinue prior prescription for Briviact  7 5mL per dose instruction  Associated Diagnoses: Lennox-Gastaut syndrome with tonic seizures (HCC)      cannabidiol (Epidiolex) 100 mg/ml SOLN 4 5 mL (450 mg total) by Per G Tube route 2 (two) times a day  Qty: 270 mL, Refills: 5    Comments: Please update prescription  Associated Diagnoses: Lennox-Gastaut syndrome with tonic seizures (HCC)      cloBAZam (ONFI) 10 MG tablet Give one and a half tab at bedtime by G-Tube  Qty: 45 tablet, Refills: 5    Comments: Discontinue half a tab in the morning  Associated Diagnoses: Lennox-Gastaut syndrome with tonic seizures (HCC)      lacosamide (Vimpat) 10 mg/mL Give by G-tube, give 5mL every 12 hours    Qty: 300 mL, Refills: 5    Associated Diagnoses: Lennox-Gastaut syndrome with tonic seizures (HCC)      melatonin 3 mg 6 mg by Per G Tube route daily at bedtime      Multiple Vitamins-Minerals (MULTIVITAMIN WITH IRON-MINERALS) liquid 5 mL by Per G Tube route daily      Probiotic Product (ALEXANDER-BID PROBIOTIC PO) 1 tablet by Per G Tube route daily        rufinamide (BANZEL) 400 mg tablet 4 tablets (1,600 mg total) by Per G Tube route every 12 (twelve) hours  Qty: 240 tablet, Refills: 5    Associated Diagnoses: Lennox-Gastaut syndrome with tonic seizures (HCC)      topiramate (TOPAMAX) 50 MG tablet 5 tablets (250 mg total) by Per G Tube route every 12 (twelve) hours  Qty: 300 tablet, Refills: 5    Associated Diagnoses: Lennox-Gastaut syndrome with tonic seizures (HCC)      acetaminophen (TYLENOL) 325 mg tablet Take 650 mg by mouth every 6 (six) hours as needed for mild pain or fever      bisacodyl (BISCOLAX) 10 mg suppository Insert 10 mg into the rectum daily at bedtime as needed for constipation (if no BM day #4)      bisacodyl (FLEET BISACODYL) 10 MG/30ML ENEM Insert 10 mg into the rectum as needed for constipation Give at hs on day 5 if suppository is not effective      diazepam (VALIUM) 5 MG/ML solution If the patient has a seizure lasting more than 3 minutes then give 1mL by PEG tube, may repeat 1mL if she continues to have seizure for more than 10 minutes  Qty: 30 mL, Refills: 1    Comments: Please discontinue Valtoco prescription  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)      magnesium hydroxide (MILK OF MAGNESIA) 400 mg/5 mL oral suspension Take 30 mL by mouth daily as needed for constipation If not BM by day 3           No discharge procedures on file  Prior to Admission Medications   Prescriptions Last Dose Informant Patient Reported? Taking?    Brivaracetam (Briviact) 10 MG/ML SOLN oral solution 2021 at Unknown time  No Yes   Si mL (50 mg total) by Per PEG Tube route every 12 (twelve) hours   Multiple Vitamins-Minerals (MULTIVITAMIN WITH IRON-MINERALS) liquid 2021 at Unknown time Outside Facility (Specify) Yes Yes   Si mL by Per G Tube route daily   Probiotic Product (ALEXANDER-BID PROBIOTIC PO) 2021 at Unknown time Outside Facility ([address] [place] [address]) Yes Yes   Si tablet by Per G Tube route daily     acetaminophen (TYLENOL) 325 mg tablet Unknown at Unknown time  Yes No   Sig: Take 650 mg by mouth every 6 (six) hours as needed for mild pain or fever   bisacodyl (BISCOLAX) 10 mg suppository Unknown at Unknown time Outside Facility (Specify) Yes No   Sig: Insert 10 mg into the rectum daily at bedtime as needed for constipation (if no BM day #4)   bisacodyl (FLEET BISACODYL) 10 MG/30ML ENEM Unknown at Unknown time  Yes No   Sig: Insert 10 mg into the rectum as needed for constipation Give at hs on day 5 if suppository is not effective   cannabidiol (Epidiolex) 100 mg/ml SOLN 2021 at Unknown time  No Yes   Si 5 mL (450 mg total) by Per G Tube route 2 (two) times a day   cloBAZam (ONFI) 10 MG tablet 8/15/2021 at Unknown time  No Yes   Sig: Give one and a half tab at bedtime by G-Tube   diazepam (VALIUM) 5 MG/ML solution Unknown at Unknown time  No No   Sig: If the patient has a seizure lasting more than 3 minutes then give 1mL by PEG tube, may repeat 1mL if she continues to have seizure for more than 10 minutes  lacosamide (Vimpat) 10 mg/mL 2021 at Unknown time  No Yes   Sig: Give by G-tube, give 5mL every 12 hours  magnesium hydroxide (MILK OF MAGNESIA) 400 mg/5 mL oral suspension Unknown at Unknown time Outside Facility (Specify) Yes No   Sig: Take 30 mL by mouth daily as needed for constipation If not BM by day 3   melatonin 3 mg 8/15/2021 at Unknown time  Yes Yes   Si mg by Per G Tube route daily at bedtime   rufinamide (BANZEL) 400 mg tablet 2021 at Unknown time  No Yes   Si tablets (1,600 mg total) by Per G Tube route every 12 (twelve) hours   topiramate (TOPAMAX) 50 MG tablet 2021 at Unknown time  No Yes   Si tablets (250 mg total) by Per G Tube route every 12 (twelve) hours      Facility-Administered Medications: None       Portions of the record may have been created with voice recognition software  Occasional wrong word or "sound a like" substitutions may have occurred due to the inherent limitations of voice recognition software  Read the chart carefully and recognize, using context, where substitutions have occurred      Electronically signed by:  MD Bijan Lopez MD  [id number]

## 2021-08-16 NOTE — ASSESSMENT & PLAN NOTE
Likely secondary to seizure medications  She is nonverbal at baseline  She will awaken to touch  Continue to monitor, but will not change any seizure medications at this time due to her need for them or else she will go into intractable seizures

## 2021-08-17 VITALS
SYSTOLIC BLOOD PRESSURE: 95 MMHG | TEMPERATURE: 97.4 F | HEART RATE: 75 BPM | HEIGHT: 60 IN | DIASTOLIC BLOOD PRESSURE: 65 MMHG | RESPIRATION RATE: 18 BRPM | BODY MASS INDEX: 19.39 KG/M2 | WEIGHT: 98.77 LBS | OXYGEN SATURATION: 95 %

## 2021-08-17 LAB
ALBUMIN SERPL BCP-MCNC: 3.1 G/DL (ref 3.5–5)
ALP SERPL-CCNC: 103 U/L (ref 46–116)
ALT SERPL W P-5'-P-CCNC: 31 U/L (ref 12–78)
ANION GAP SERPL CALCULATED.3IONS-SCNC: 12 MMOL/L (ref 4–13)
AST SERPL W P-5'-P-CCNC: 26 U/L (ref 5–45)
BASOPHILS # BLD AUTO: 0.06 THOUSANDS/ΜL (ref 0–0.1)
BASOPHILS NFR BLD AUTO: 1 % (ref 0–1)
BILIRUB SERPL-MCNC: 0.48 MG/DL (ref 0.2–1)
BUN SERPL-MCNC: 23 MG/DL (ref 5–25)
CALCIUM ALBUM COR SERPL-MCNC: 10.1 MG/DL (ref 8.3–10.1)
CALCIUM SERPL-MCNC: 9.4 MG/DL (ref 8.3–10.1)
CHLORIDE SERPL-SCNC: 105 MMOL/L (ref 100–108)
CO2 SERPL-SCNC: 20 MMOL/L (ref 21–32)
CREAT SERPL-MCNC: 0.41 MG/DL (ref 0.6–1.3)
EOSINOPHIL # BLD AUTO: 0.32 THOUSAND/ΜL (ref 0–0.61)
EOSINOPHIL NFR BLD AUTO: 4 % (ref 0–6)
ERYTHROCYTE [DISTWIDTH] IN BLOOD BY AUTOMATED COUNT: 12.3 % (ref 11.6–15.1)
GFR SERPL CREATININE-BSD FRML MDRD: 130 ML/MIN/1.73SQ M
GLUCOSE P FAST SERPL-MCNC: 110 MG/DL (ref 65–99)
GLUCOSE SERPL-MCNC: 110 MG/DL (ref 65–140)
HCT VFR BLD AUTO: 35.9 % (ref 34.8–46.1)
HGB BLD-MCNC: 11.6 G/DL (ref 11.5–15.4)
IMM GRANULOCYTES # BLD AUTO: 0.01 THOUSAND/UL (ref 0–0.2)
IMM GRANULOCYTES NFR BLD AUTO: 0 % (ref 0–2)
LYMPHOCYTES # BLD AUTO: 1.98 THOUSANDS/ΜL (ref 0.6–4.47)
LYMPHOCYTES NFR BLD AUTO: 24 % (ref 14–44)
MCH RBC QN AUTO: 31.7 PG (ref 26.8–34.3)
MCHC RBC AUTO-ENTMCNC: 32.3 G/DL (ref 31.4–37.4)
MCV RBC AUTO: 98 FL (ref 82–98)
MONOCYTES # BLD AUTO: 0.78 THOUSAND/ΜL (ref 0.17–1.22)
MONOCYTES NFR BLD AUTO: 10 % (ref 4–12)
NEUTROPHILS # BLD AUTO: 4.97 THOUSANDS/ΜL (ref 1.85–7.62)
NEUTS SEG NFR BLD AUTO: 61 % (ref 43–75)
NRBC BLD AUTO-RTO: 0 /100 WBCS
PLATELET # BLD AUTO: 180 THOUSANDS/UL (ref 149–390)
PMV BLD AUTO: 12 FL (ref 8.9–12.7)
POTASSIUM SERPL-SCNC: 3.6 MMOL/L (ref 3.5–5.3)
PROT SERPL-MCNC: 7.4 G/DL (ref 6.4–8.2)
RBC # BLD AUTO: 3.66 MILLION/UL (ref 3.81–5.12)
SARS-COV-2 RNA RESP QL NAA+PROBE: NEGATIVE
SODIUM SERPL-SCNC: 137 MMOL/L (ref 136–145)
WBC # BLD AUTO: 8.12 THOUSAND/UL (ref 4.31–10.16)

## 2021-08-17 PROCEDURE — 85025 COMPLETE CBC W/AUTO DIFF WBC: CPT | Performed by: PHYSICIAN ASSISTANT

## 2021-08-17 PROCEDURE — 99217 PR OBSERVATION CARE DISCHARGE MANAGEMENT: CPT | Performed by: PHYSICIAN ASSISTANT

## 2021-08-17 PROCEDURE — NC001 PR NO CHARGE: Performed by: SURGERY

## 2021-08-17 PROCEDURE — U0005 INFEC AGEN DETEC AMPLI PROBE: HCPCS | Performed by: PHYSICIAN ASSISTANT

## 2021-08-17 PROCEDURE — U0003 INFECTIOUS AGENT DETECTION BY NUCLEIC ACID (DNA OR RNA); SEVERE ACUTE RESPIRATORY SYNDROME CORONAVIRUS 2 (SARS-COV-2) (CORONAVIRUS DISEASE [COVID-19]), AMPLIFIED PROBE TECHNIQUE, MAKING USE OF HIGH THROUGHPUT TECHNOLOGIES AS DESCRIBED BY CMS-2020-01-R: HCPCS | Performed by: PHYSICIAN ASSISTANT

## 2021-08-17 PROCEDURE — 80053 COMPREHEN METABOLIC PANEL: CPT | Performed by: PHYSICIAN ASSISTANT

## 2021-08-17 PROCEDURE — 99213 OFFICE O/P EST LOW 20 MIN: CPT | Performed by: SURGERY

## 2021-08-17 RX ADMIN — TOPIRAMATE 250 MG: 100 TABLET, FILM COATED ORAL at 09:31

## 2021-08-17 RX ADMIN — RUFINAMIDE 1600 MG: 40 SUSPENSION ORAL at 09:32

## 2021-08-17 RX ADMIN — BRIVARACETAM 50 MG: 10 SOLUTION ORAL at 09:32

## 2021-08-17 RX ADMIN — ENOXAPARIN SODIUM 30 MG: 30 INJECTION SUBCUTANEOUS at 09:31

## 2021-08-17 RX ADMIN — LACOSAMIDE 50 MG: 50 TABLET, FILM COATED ORAL at 09:31

## 2021-08-17 NOTE — PLAN OF CARE
Problem: SAFETY,RESTRAINT: NV/NON-SELF DESTRUCTIVE BEHAVIOR  Goal: Remains free of harm/injury (restraint for non violent/non self-detsructive behavior)  Description: INTERVENTIONS:  - Instruct patient/family regarding restraint use   - Assess and monitor physiologic and psychological status   - Provide interventions and comfort measures to meet assessed patient needs   - Identify and implement measures to help patient regain control  - Assess readiness for release of restraint   8/17/2021 1051 by Curtis Armendariz RN  Outcome: Adequate for Discharge  8/17/2021 1051 by Curtis Armendariz RN  Outcome: Adequate for Discharge  Goal: Returns to optimal restraint-free functioning  Description: INTERVENTIONS:  - Assess the patient's behavior and symptoms that indicate continued need for restraint  - Identify and implement measures to help patient regain control  - Assess readiness for release of restraint   8/17/2021 1051 by Curtis Armendariz RN  Outcome: Adequate for Discharge  8/17/2021 1051 by Curtis Armendariz RN  Outcome: Adequate for Discharge

## 2021-08-17 NOTE — PROGRESS NOTES
Progress Note - General Surgery   Charles Mix 37 y o  female MRN: [id number]  Unit/Bed#: 425-01 Encounter: [id number]    Assessment:  45yo F presenting with PEG tube malfunction   - Patient requires PEG tube for feedings and medications  Many of her seizure medications do not have IV alternatives and need to be given via PEG tube  - CT from 8/16 reviewed, "PEG tube tip/balloon is within the stomach, just lateral to a band of soft tissue in the anterior abdominal wall correlating to the history of prior to PEG tube revision  "  - afebrile, VSS, leukocytosis resolved WBC 8 1 from 11 49    Plan:  - daily dressing changes, local wound care  - consult placed to wound care nurse  - Will need to follow up with wound care when discharged   - continue tube feeds, medication administration via PEG  - Continue medical management per primary team    Subjective/Objective   Subjective: No acute events overnight  Patient is nonverbal  Does not appear to be in any distress  Objective: Blood pressure 93/62, pulse 78, temperature (!) 97 °F (36 1 °C), temperature source Temporal, resp  rate 16, height 5' (1 524 m), weight 44 8 kg (98 lb 12 3 oz), SpO2 99 %  ,Body mass index is 19 29 kg/m²        Intake/Output Summary (Last 24 hours) at 8/17/2021 0754  Last data filed at 8/16/2021 1407  Gross per 24 hour   Intake 50 ml   Output --   Net 50 ml       Invasive Devices     Peripheral Intravenous Line            Peripheral IV 08/16/21 Right Hand <1 day          Drain            Gastrostomy/Enterostomy Gastrostomy 14 Fr   days                Physical Exam: BP 95/65 (BP Location: Left arm)   Pulse 75   Temp (!) 97 4 °F (36 3 °C) (Temporal)   Resp 18   Ht 5' (1 524 m)   Wt 44 8 kg (98 lb 12 3 oz)   LMP  (LMP Unknown)   SpO2 95%   BMI 19 29 kg/m²   General appearance: appears stated age, cooperative and no distress  Head: Normocephalic, without obvious abnormality, atraumatic  Lungs: clear to auscultation bilaterally  Heart: regular rate and rhythm, S1, S2 normal, no murmur, click, rub or gallop  Abdomen: soft, non-tender; bowel sounds normal; no masses,  no organomegaly and previous PEG site noted, current PEG tube in place in LUQ, surrounding erythema (mild)    Lab, Imaging and other studies:  I have personally reviewed pertinent lab results    , CBC:   Lab Results   Component Value Date    WBC 8 12 08/17/2021    HGB 11 6 08/17/2021    HCT 35 9 08/17/2021    MCV 98 08/17/2021     08/17/2021    MCH 31 7 08/17/2021    MCHC 32 3 08/17/2021    RDW 12 3 08/17/2021    MPV 12 0 08/17/2021    NRBC 0 08/17/2021   , CMP:   Lab Results   Component Value Date    SODIUM 137 08/17/2021    K 3 6 08/17/2021     08/17/2021    CO2 20 (L) 08/17/2021    BUN 23 08/17/2021    CREATININE 0 41 (L) 08/17/2021    CALCIUM 9 4 08/17/2021    AST 26 08/17/2021    ALT 31 08/17/2021    ALKPHOS 103 08/17/2021    EGFR 130 08/17/2021 8/16/21 CT abdomen/pelvis:  "PEG tube tip/balloon is within the stomach, just lateral to a band of soft tissue in the anterior abdominal wall correlating to the history of prior to PEG tube revision "    VTE Pharmacologic Prophylaxis: Enoxaparin (Lovenox)  VTE Mechanical Prophylaxis: sequential compression device    Anahy Easley PA-C  8/17/21

## 2021-08-17 NOTE — NURSING NOTE
Discharged in no acute distress to [facility]  Transported by [facility] ambulance

## 2021-08-17 NOTE — DISCHARGE SUMMARY
[address] [facility] [address]  Discharge- Zully Alfred [facility] COTTAGE 1980, 37 y o  female MRN: [id number]  Unit/Bed#: 425-01 Encounter: [id number]  Primary Care Provider: Izzy Henry DO   Date and time admitted to hospital: 8/16/2021 11:45 AM    * PEG tube malfunction [facility])  Assessment & Plan  Patient here with chronic PEG tube used for feedings and medications, absolutely NEEDS this to obtain her seizure medications otherwise she will have intractable seizures  PEG tube recently replaced four weeks ago  CT shows that it is still in place within the stomach  There is a wound surrounding the PEG tube site that is weeping but does not look infectious at this time although pt received one time dose of Ancef in the ED prophylactically  Cannot remove and replace at a later time, needs to be maintained and managed with wound care   Surgery consulted, appreciate input, they packed it and moved the bridge to better manage placement and wound was covered with gauze, maintain this for now  Will need to follow up with wound care when discharged  Maintained PEG feedings  Mits in place due to patient scratching at the site    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[facility])  Assessment & Plan  Follows with Dr Emmanuelle Adam of neurology  Per his notes "continuous EEG monitoring frequent finds innumerable number of tonic seizures despite multiple medication trials and VNS therapy"  Recently saw him on 7/8 and she was started on Vimpat in addition to her max dose of topamax, Onfi, Epidiolex, Briviact, Banzel  onfi was changed from morning to night and increased from 5 mg to 15 mg  These are all given via PEG tube and any time she does not receive her meds she will go into intractable seizure activity  Remained stable during observation, we did not have Epidiolex on formulary for this patient, contacted [place] to bring it, unfortunately did not happen   Patient to resume immediately on discharge    Low blood pressure  Assessment & Plan  This is a chronic issue for this patient and she typically runs low 92S systolic   Remained stable during observation    Underweight  Assessment & Plan  Maintained on Peg tube feeds with high protein    Cerebral anoxic injury [facility])  Assessment & Plan  Pt has cerebral palsy, is nonverbal at baseline        Medical Problems     Resolved Problems  Date Reviewed: 8/17/2021    None              Discharging Physician / Practitioner: Mari Ya PA-C  PCP: Philip Sierra DO  Admission Date:   Admission Orders (From admission, onward)     Ordered        08/16/21 1614  Place in Observation  Once                   Discharge Date: 08/17/21    Consultations During Hospital Stay:  · Surgery - Dr Yahir Hernandez    Procedures Performed:   · none    Significant Findings / Test Results:   · CT abdomen - PEG tube tip/balloon is within the stomach, just lateral to a band of soft tissue in the anterior abdominal wall correlating to the history of prior to PEG tube revision  Incidental Findings:   · none    Test Results Pending at Discharge (will require follow up):   · none     Outpatient Tests Requested:  · Wound care follow-up    Complications:  none    Reason for Admission: PEG tube care    Hospital Course:   Kurt Schaeffer is a 37 y o  female patient who originally presented to the hospital on 8/16/2021 due to wound present around PEG tube that was placed four weeks prior to admission  PEG tube was still usable  Wound present around the site, weeping but not infectious in appearance  Patient does have chronic skin breakdown and she often picks at the site  She was afebrile but did have a slight white count so she was provided one dose of Ancef  Surgery consulted who repositioned the PEG tube so that it would not be removed and packed the site  Will need to follow with wound care outpatient to ensure no infections develop  Please see above list of diagnoses and related plan for additional information       Condition at Discharge: stable    Discharge Day Visit / Exam:   Subjective:  Patient nonverbal at baseline but does not appear to be in any distress and vitals have remained stable  Her PEG tube site has remained in place and is usable  Vitals: Blood Pressure: 95/65 (08/17/21 0803)  Pulse: 75 (08/17/21 0803)  Temperature: (!) 97 4 °F (36 3 °C) (08/17/21 0803)  Temp Source: Temporal (08/17/21 0803)  Respirations: 18 (08/17/21 0803)  Height: 5' (152 4 cm) (08/16/21 1651)  Weight - Scale: 44 8 kg (98 lb 12 3 oz) (08/16/21 1651)  SpO2: 95 % (08/17/21 0803)  Exam:   Physical Exam  Vitals reviewed  Constitutional:       Comments: Cachectic and underweight, opens eyes to noise but nonverbal at baseline, does not appear to be in any acute distress   HENT:      Head: Normocephalic and atraumatic  Cardiovascular:      Rate and Rhythm: Normal rate and regular rhythm  Pulses: Normal pulses  Heart sounds: Normal heart sounds  Pulmonary:      Effort: Pulmonary effort is normal       Breath sounds: Normal breath sounds  Abdominal:      General: Bowel sounds are normal       Palpations: Abdomen is soft  Comments: PEG tube in place covered in gauze and packed   Musculoskeletal:      Comments: Contractures present of upper extremities, no edema noted   Skin:     General: Skin is warm and dry  Neurological:      Comments: Unable to determine   Psychiatric:      Comments: Not agitated at this time, mits in place to prevent scratching          Discussion with Family: Spoke with father yesterday who is aware of the situation and is aware she is being discharged today  Discharge instructions/Information to patient and family:   See after visit summary for information provided to patient and family  Provisions for Follow-Up Care:  See after visit summary for information related to follow-up care and any pertinent home health orders         Disposition:   [facility] Readmission: none Discharge Statement:  I spent 32 minutes discharging the patient  This time was spent on the day of discharge  I had direct contact with the patient on the day of discharge  Greater than 50% of the total time was spent examining patient, answering all patient questions, arranging and discussing plan of care with patient as well as directly providing post-discharge instructions  Additional time then spent on discharge activities  Discharge Medications:  See after visit summary for reconciled discharge medications provided to patient and/or family        **Please Note: This note may have been constructed using a voice recognition system**

## 2021-08-17 NOTE — DISCHARGE INSTRUCTIONS
Wound care: Please change dressing daily  Gently cleanse wound with sterile water and pat dry  Keep bridge perpendicular to wound to avoid further skin breakdown  Pack medial and lateral edges of wound in LUQ with 1/2in iodoform packing  Cover area with a drain sponge and 4x4 gauze and secure with medical tape  PEG tube may be used for medication administration and feedings  Patient will need follow up at wound care center as an outpatient

## 2021-08-17 NOTE — ASSESSMENT & PLAN NOTE
Follows with Dr Alexandra Swenson of neurology  Per his notes "continuous EEG monitoring frequent finds innumerable number of tonic seizures despite multiple medication trials and VNS therapy"  Recently saw him on 7/8 and she was started on Vimpat in addition to her max dose of topamax, Onfi, Epidiolex, Briviact, Banzel  onfi was changed from morning to night and increased from 5 mg to 15 mg  These are all given via PEG tube and any time she does not receive her meds she will go into intractable seizure activity  Remained stable during observation, we did not have Epidiolex on formulary for this patient, contacted [place] to bring it, unfortunately did not happen   Patient to resume immediately on discharge

## 2021-08-17 NOTE — ASSESSMENT & PLAN NOTE
Patient here with chronic PEG tube used for feedings and medications, absolutely NEEDS this to obtain her seizure medications otherwise she will have intractable seizures  PEG tube recently replaced four weeks ago  CT shows that it is still in place within the stomach  There is a wound surrounding the PEG tube site that is weeping but does not look infectious at this time although pt received one time dose of Ancef in the ED prophylactically  Cannot remove and replace at a later time, needs to be maintained and managed with wound care   Surgery consulted, appreciate input, they packed it and moved the bridge to better manage placement and wound was covered with gauze, maintain this for now  Will need to follow up with wound care when discharged  Maintained PEG feedings  Mits in place due to patient scratching at the site

## 2021-08-17 NOTE — ASSESSMENT & PLAN NOTE
This is a chronic issue for this patient and she typically runs low 06M systolic   Remained stable during observation

## 2021-08-17 NOTE — PLAN OF CARE
Problem: Nutrition/Hydration-ADULT  Goal: Nutrient/Hydration intake appropriate for improving, restoring or maintaining nutritional needs  Description: Monitor and assess patient's nutrition/hydration status for malnutrition  Collaborate with interdisciplinary team and initiate plan and interventions as ordered  Monitor patient's weight and dietary intake as ordered or per policy  Utilize nutrition screening tool and intervene as necessary  Determine patient's food preferences and provide high-protein, high-caloric foods as appropriate       INTERVENTIONS:  - Monitor oral intake, urinary output, labs, and treatment plans  - Assess nutrition and hydration status and recommend course of action  - Evaluate amount of meals eaten  - Assist patient with eating if necessary   - Allow adequate time for meals  - Recommend/ encourage appropriate diets, oral nutritional supplements, and vitamin/mineral supplements  - Order, calculate, and assess calorie counts as needed  - Recommend, monitor, and adjust tube feedings and TPN/PPN based on assessed needs  - Assess need for intravenous fluids  - Provide specific nutrition/hydration education as appropriate  - Include patient/family/caregiver in decisions related to nutrition  Outcome: Progressing     Problem: MOBILITY - ADULT  Goal: Maintain or return to baseline ADL function  Description: INTERVENTIONS:  -  Assess patient's ability to carry out ADLs; assess patient's baseline for ADL function and identify physical deficits which impact ability to perform ADLs (bathing, care of mouth/teeth, toileting, grooming, dressing, etc )  - Assess/evaluate cause of self-care deficits   - Assess range of motion  - Assess patient's mobility; develop plan if impaired  - Assess patient's need for assistive devices and provide as appropriate  - Encourage maximum independence but intervene and supervise when necessary  - Involve family in performance of ADLs  - Assess for home care needs following discharge   - Consider OT consult to assist with ADL evaluation and planning for discharge  - Provide patient education as appropriate  Outcome: Progressing  Goal: Maintains/Returns to pre admission functional level  Description: INTERVENTIONS:  - Perform BMAT or MOVE assessment daily    - Set and communicate daily mobility goal to care team and patient/family/caregiver     - Collaborate with rehabilitation services on mobility goals if consulted  - Out of bed for toileting  - Record patient progress and toleration of activity level   Outcome: Progressing     Problem: Prexisting or High Potential for Compromised Skin Integrity  Goal: Skin integrity is maintained or improved  Description: INTERVENTIONS:  - Identify patients at risk for skin breakdown  - Assess and monitor skin integrity  - Assess and monitor nutrition and hydration status  - Monitor labs   - Assess for incontinence   - Turn and reposition patient  - Assist with mobility/ambulation  - Relieve pressure over bony prominences  - Avoid friction and shearing  - Provide appropriate hygiene as needed including keeping skin clean and dry  - Evaluate need for skin moisturizer/barrier cream  - Collaborate with interdisciplinary team   - Patient/family teaching  - Consider wound care consult   Outcome: Progressing     Problem: Potential for Falls  Goal: Patient will remain free of falls  Description: INTERVENTIONS:  - Educate patient/family on patient safety including physical limitations  - Instruct patient to call for assistance with activity   - Consult OT/PT to assist with strengthening/mobility   - Keep Call bell within reach  - Keep bed low and locked with side rails adjusted as appropriate  - Keep care items and personal belongings within reach  - Initiate and maintain comfort rounds  - Make Fall Risk Sign visible to staff  - Apply yellow socks and bracelet for high fall risk patients  - Consider moving patient to room near nurses station  Outcome: Progressing     Problem: PAIN - ADULT  Goal: Verbalizes/displays adequate comfort level or baseline comfort level  Description: Interventions:  - Encourage patient to monitor pain and request assistance  - Assess pain using appropriate pain scale  - Administer analgesics based on type and severity of pain and evaluate response  - Implement non-pharmacological measures as appropriate and evaluate response  - Consider cultural and social influences on pain and pain management  - Notify physician/advanced practitioner if interventions unsuccessful or patient reports new pain  Outcome: Progressing     Problem: INFECTION - ADULT  Goal: Absence or prevention of progression during hospitalization  Description: INTERVENTIONS:  - Assess and monitor for signs and symptoms of infection  - Monitor lab/diagnostic results  - Monitor all insertion sites, i e  indwelling lines, tubes, and drains  - Monitor endotracheal if appropriate and nasal secretions for changes in amount and color  - [place] appropriate cooling/warming therapies per order  - Administer medications as ordered  - Instruct and encourage patient and family to use good hand hygiene technique  - Identify and instruct in appropriate isolation precautions for identified infection/condition  Outcome: Progressing     Problem: SAFETY ADULT  Goal: Maintain or return to baseline ADL function  Description: INTERVENTIONS:  -  Assess patient's ability to carry out ADLs; assess patient's baseline for ADL function and identify physical deficits which impact ability to perform ADLs (bathing, care of mouth/teeth, toileting, grooming, dressing, etc )  - Assess/evaluate cause of self-care deficits   - Assess range of motion  - Assess patient's mobility; develop plan if impaired  - Assess patient's need for assistive devices and provide as appropriate  - Encourage maximum independence but intervene and supervise when necessary  - Involve family in performance of ADLs  - Assess for home care needs following discharge   - Consider OT consult to assist with ADL evaluation and planning for discharge  - Provide patient education as appropriate  Outcome: Progressing  Goal: Maintains/Returns to pre admission functional level  Description: INTERVENTIONS:  - Perform BMAT or MOVE assessment daily    - Set and communicate daily mobility goal to care team and patient/family/caregiver     - Collaborate with rehabilitation services on mobility goals if consulted  - Out of bed for toileting  - Record patient progress and toleration of activity level   Outcome: Progressing  Goal: Patient will remain free of falls  Description: INTERVENTIONS:  - Educate patient/family on patient safety including physical limitations  - Instruct patient to call for assistance with activity   - Consult OT/PT to assist with strengthening/mobility   - Keep Call bell within reach  - Keep bed low and locked with side rails adjusted as appropriate  - Keep care items and personal belongings within reach  - Initiate and maintain comfort rounds  - Make Fall Risk Sign visible to staff  - Apply yellow socks and bracelet for high fall risk patients  - Consider moving patient to room near nurses station  Outcome: Progressing     Problem: DISCHARGE PLANNING  Goal: Discharge to home or other facility with appropriate resources  Description: INTERVENTIONS:  - Identify barriers to discharge w/patient and caregiver  - Arrange for needed discharge resources and transportation as appropriate  - Identify discharge learning needs (meds, wound care, etc )  - Arrange for interpretive services to assist at discharge as needed  - Refer to Case Management Department for coordinating discharge planning if the patient needs post-hospital services based on physician/advanced practitioner order or complex needs related to functional status, cognitive ability, or social support system  Outcome: Progressing     Problem: Knowledge Deficit  Goal: Patient/family/caregiver demonstrates understanding of disease process, treatment plan, medications, and discharge instructions  Description: Complete learning assessment and assess knowledge base    Interventions:  - Provide teaching at level of understanding  - Provide teaching via preferred learning methods  Outcome: Progressing     Problem: NEUROSENSORY - ADULT  Goal: Remains free of injury related to seizures activity  Description: INTERVENTIONS  - Maintain airway, patient safety  and administer oxygen as ordered  - Monitor patient for seizure activity, document and report duration and description of seizure to physician/advanced practitioner  - If seizure occurs,  ensure patient safety during seizure  - Reorient patient post seizure  - Seizure pads on all 4 side rails  - Instruct patient/family to notify RN of any seizure activity including if an aura is experienced  - Instruct patient/family to call for assistance with activity based on nursing assessment  - Administer anti-seizure medications if ordered    Outcome: Progressing     Problem: SKIN/TISSUE INTEGRITY - ADULT  Goal: Incision(s), wounds(s) or drain site(s) healing without S/S of infection  Description: INTERVENTIONS  - Assess and document dressing, incision, wound bed, drain sites and surrounding tissue  - Provide patient and family education  Outcome: Progressing

## 2021-08-17 NOTE — CASE MANAGEMENT
Case Management Discharge Planning Note    Patient name Berenice Pichardo  Location /629-44 MRN [id number]  : 1980 Date 2021       Current Admission Date: 2021  Current Admission Diagnosis:  PEG tube malfunction [facility])   Patient Active Problem List   Diagnosis    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[facility] [address] )    Underweight    Osteoporosis    Onychomycosis    Hyperkeratosis    Cerebral anoxic injury ([facility] )    Intractable epilepsy with status epilepticus ([facility] )    Somnolence    Low blood pressure    Incontinence    Skin breakdown    MRSA colonization    Right atrial enlargement    Hypophosphatemia    Sedated due to multiple medications    Breast mass    S/P placement of VNS (vagus nerve stimulation) device    Moderate protein-calorie malnutrition (HCC)    Dislodged gastrostomy tube    Fatty infiltration of liver    Intellectual disability with epilepsy ([facility] )    Labyrinthine disorder    Labial cyst    PEG tube malfunction ([facility] )    Previous Admission - Discharge Date:21   LOS (days): 0  Geometric Mean LOS (GMLOS) (days):   Days to GMLOS: Previous Discharge Diagnosis:  There are no discharge diagnoses documented for the most recent discharge  Risk of Unplanned Readmission Score  Predictive Model Details   No score data available for Risk of Unplanned Readmission        BUNDLE:      OBJECTIVE:  Pt is a 37y o  year old Single, white or  [1], female with Yazidism preference of Latter day admitted on  11:45 AM  Pt is admitted to [facility] at [address] [place] [address] [address] with complaints of PEG tube malfunction ([facility] )     Current admission status: Observation  Preferred Pharmacy:   [id number] [place] [place], [address] [place] [id number]  Phone: [phone] Fax: [address] [address]  Phone: [phone] Fax: [phone]    [facility] [address], [address] [facility]  [address]  [address] [place] [address] Drive [address]  Phone: [phone] Fax: [phone]    Primary Care Provider: Javed Redmond DO    Primary Insurance: MEDICARE  Secondary Insurance: [facility]    DISCHARGE DETAILS:  Pt is being discharged today  Pt will return to [address]  [facility] will pick the patient up at 3:30 pm   Pt will need a Covid Swab prior to returning to [address]  A copy of AVS faxed to [facility]  Discharge Destination Plan[de-identified] SNF  Transportation at Discharge?: Yes  Type of Transport: S Ambulance     Number/Name of Dispatcher: Oretha Kehr  [phone]  Transported by Tiana and Unit #):  [id number] [facility]  ETA of Transport: 1530     Transfer Mode: Stretcher  Accompanied by: Alone

## 2021-08-19 ENCOUNTER — TELEPHONE (OUTPATIENT)
Dept: SURGERY | Facility: CLINIC | Age: 41
End: 2021-08-19

## 2022-01-13 DIAGNOSIS — G40.812 LENNOX-GASTAUT SYNDROME WITH TONIC SEIZURES (HCC): ICD-10-CM

## 2022-02-02 NOTE — ANESTHESIA PREPROCEDURE EVALUATION
Please bring in a copy of your advance directive at your next visit, or drop off a copy at any [place] facility so that it can be added to your medical record.       .  .                                                      At [facility], one important tool we use to improve our patient services is our Patient Survey.  Following your visit you may receive our survey in the mail or by email.    Please take the time to complete the survey.    If your visit with us was great, we want to hear about it.    If we can improve, please let us know how.            Review of Systems/Medical History  Patient summary reviewed  Chart reviewed  No history of anesthetic complications     Cardiovascular   Pulmonary  Negative pulmonary ROS        GI/Hepatic      Comment:  gastrostomy tube      Negative  ROS        Endo/Other  Negative endo/other ROS      GYN  Negative gynecology ROS          Hematology  Negative hematology ROS      Musculoskeletal       Neurology  Seizures (Intractable epilepsy without status epilepticus) poorly controlled,    Comment: Lennox-Gastaut syndrome with tonic seizures     Cerebral anoxic injury  Psychology   Psychiatric history,     Comment: Autistic disorder         Physical Exam    Airway    Mallampati score: II  TM Distance: >3 FB  Neck ROM: full     Dental       Cardiovascular      Pulmonary      Other Findings        Anesthesia Plan  ASA Score- 3     Anesthesia Type- IV sedation with anesthesia with ASA Monitors  Additional Monitors:   Airway Plan:         Plan Factors-    Induction-     Postoperative Plan-     Informed Consent-   I personally reviewed this patient with the CRNA  Discussed and agreed on the Anesthesia Plan with the CRNA  Lynne Chairez

## 2022-03-03 ENCOUNTER — OFFICE VISIT (OUTPATIENT)
Dept: NEUROLOGY | Facility: CLINIC | Age: 42
End: 2022-03-03
Payer: MEDICARE

## 2022-03-03 VITALS
TEMPERATURE: 97.6 F | SYSTOLIC BLOOD PRESSURE: 110 MMHG | HEART RATE: 105 BPM | DIASTOLIC BLOOD PRESSURE: 72 MMHG | OXYGEN SATURATION: 98 % | RESPIRATION RATE: 18 BRPM

## 2022-03-03 DIAGNOSIS — G40.812 LENNOX-GASTAUT SYNDROME WITH TONIC SEIZURES (HCC): Primary | ICD-10-CM

## 2022-03-03 PROCEDURE — 99215 OFFICE O/P EST HI 40 MIN: CPT | Performed by: PSYCHIATRY & NEUROLOGY

## 2022-03-03 PROCEDURE — 95976 ALYS SMPL CN NPGT PRGRMG: CPT | Performed by: PSYCHIATRY & NEUROLOGY

## 2022-03-03 RX ORDER — RUFINAMIDE 400 MG/1
1600 TABLET, FILM COATED ORAL EVERY 12 HOURS
Qty: 240 TABLET | Refills: 5 | Status: SHIPPED | OUTPATIENT
Start: 2022-03-03 | End: 2022-03-03 | Stop reason: SDUPTHER

## 2022-03-03 RX ORDER — BRIVARACETAM 10 MG/ML
50 SOLUTION ORAL EVERY 12 HOURS
Qty: 300 ML | Refills: 5 | Status: SHIPPED | OUTPATIENT
Start: 2022-03-03 | End: 2022-03-03 | Stop reason: SDUPTHER

## 2022-03-03 RX ORDER — TOPIRAMATE 50 MG/1
250 TABLET, FILM COATED ORAL EVERY 12 HOURS SCHEDULED
Qty: 300 TABLET | Refills: 5 | Status: SHIPPED | OUTPATIENT
Start: 2022-03-03 | End: 2022-07-22 | Stop reason: SDUPTHER

## 2022-03-03 RX ORDER — CLOBAZAM 10 MG/1
10 TABLET ORAL
Qty: 30 TABLET | Refills: 5 | Status: SHIPPED | OUTPATIENT
Start: 2022-03-03 | End: 2022-07-22 | Stop reason: SDUPTHER

## 2022-03-03 RX ORDER — BRIVARACETAM 10 MG/ML
50 SOLUTION ORAL EVERY 12 HOURS
Qty: 300 ML | Refills: 5 | Status: SHIPPED | OUTPATIENT
Start: 2022-03-03 | End: 2022-07-22 | Stop reason: SDUPTHER

## 2022-03-03 RX ORDER — TOPIRAMATE 50 MG/1
250 TABLET, FILM COATED ORAL EVERY 12 HOURS SCHEDULED
Qty: 300 TABLET | Refills: 5 | Status: SHIPPED | OUTPATIENT
Start: 2022-03-03 | End: 2022-03-03 | Stop reason: SDUPTHER

## 2022-03-03 RX ORDER — LACOSAMIDE 10 MG/ML
SOLUTION ORAL
Qty: 600 ML | Refills: 5 | Status: SHIPPED | OUTPATIENT
Start: 2022-03-03 | End: 2022-03-03 | Stop reason: SDUPTHER

## 2022-03-03 RX ORDER — LACOSAMIDE 10 MG/ML
SOLUTION ORAL
Qty: 600 ML | Refills: 5 | Status: SHIPPED | OUTPATIENT
Start: 2022-03-03 | End: 2022-07-22 | Stop reason: SDUPTHER

## 2022-03-03 RX ORDER — CLOBAZAM 10 MG/1
10 TABLET ORAL
Qty: 30 TABLET | Refills: 5 | Status: SHIPPED | OUTPATIENT
Start: 2022-03-03 | End: 2022-03-03 | Stop reason: SDUPTHER

## 2022-03-03 RX ORDER — RUFINAMIDE 400 MG/1
1600 TABLET, FILM COATED ORAL EVERY 12 HOURS
Qty: 240 TABLET | Refills: 5 | Status: SHIPPED | OUTPATIENT
Start: 2022-03-03 | End: 2022-07-22 | Stop reason: SDUPTHER

## 2022-03-03 NOTE — PROGRESS NOTES
[facility]  Patient's Name: Vivian Ruvalcaba   Patient's : 1980   Visit Type: follow-up  Referring MD / PCP:  Eri Lawler DO     Assessment:  Ms Tanner Platt is a 37 y o  woman with progressive decline of encephalopathy in the setting of intractable Lennox Gastaut Syndrome; she continues to have daily tonic seizures which have never been under control from any medication combination; at least her generalized convulsive seizures are infrequent with current medications  We are balancing the risk of medication side effects and potential seizure control  There is a remote history of anoxic brain injury (however her MRI brain study is normal)  Her EEGs has features of both generalized and focal epilepsy; her focal seizures seem to occur from the bilateral temporal regions with rhythmic discharges, but no apparent clinical symptoms  She has very poor prognosis with respect to seizure control and ongoing functional decline  She is on maximal doses of Epidiolex, topiramate, rufinamide without improvement in tonic seizure frequency  Will try a higher dose of lacosamide (Vimpat) and reduce clobazam dose  Clobazam may be contributing to excessive sedation in combination with Epidiolex  But with her reduced weight we may also need to reduce her dose of Epidiolex  Plan:   Medications are given by PEG  1 - Continue Epidiolex 100mg/mL 4 5 mL (450mg) every 12 hours (Goes to [facility] specialty pharmacy)  2 - Continue Briviact 10mg/mL 5mL (50mg) every 12 hours  3 - continue with Topiramate 250mg every 12 hours  4 - continue Banzel 400mg give 4 tabs every 12 hours  5 - Change clobazam to 10mg one tab at bedtime  6 - Increase Vimpat 10mg/mL give 10mL every 12 hours    7 - give diazepam 5mg/mL 1 mL as needed for seizure last more than 3 minutes and repeat second dose for seizure lasting more than 10 minutes  8 - check blood work:  Clobazam level, lacosamide level, topiramate level  9 - Call the office if there is increase in seizure frequency  10 - follow-up in 3 months      Problem List Items Addressed This Visit     None      Visit Diagnoses     Lennox-Gastaut syndrome with tonic seizures (HCC)    -  Primary    Relevant Medications    cannabidiol (Epidiolex) 100 mg/ml SOLN    cloBAZam (ONFI) 10 MG tablet    lacosamide (Vimpat) 10 mg/mL    rufinamide (BANZEL) 400 mg tablet    topiramate (TOPAMAX) 50 MG tablet    Brivaracetam (Briviact) 10 MG/ML SOLN oral solution    Other Relevant Orders    Topiramate level    Lacosamide    Clobazam        Chief Complaint:    Chief Complaint     Seizures        HPI:    Orlinda Curling is a 37 y  o female here for follow-up evaluation of intractable epilepsy in the setting of LGS  Interval History 3/3/2022  She is here with Sara De Anda who is a CNA and unit secretary at [facility]  Sara De Anda has known Surjit Coleman since she was admitted to [facility] since 2013  Surjit Coleman continues to decline in function; she is constantly sedated or sleepy, she may be awake for 30 minutes a day  She is sometimes on a play mat, she mostly lays there, sometimes she plays with her toys  One time she was able to say her name and clap her hands and laugh at something  She has moments of increased alertness and awareness  I spoke to Olinda the supervisor nurse for today  She has been okay, random episodes of seizure activity, which consists of body head rigidity, eyes opening up, sometimes mild twitching of the arms  She has been having more bruxism, when she is awake and asleep  It has been no generalized tonic clonic seizure for a while  The last time a rescue medication was needed was back in September 2021  AED/side effects/compliance:  Banzel 400mg give 4 tabs twice a day   Topiramate 250mg tab twice a day   Epidiolex 450mg twice a day  Briviact 10mg/mL 50mL twice a day  Onfi 5-10    Event/Seizure semiology:  Seizure semiology:  1   She was described to have drop attacks or spells with grunting sounds and falling to the floor  2  Her nurse commented on episodes of spasms or myoclonic jerking of the right arm  3  Generalized convulsions  4  Tonic seizures of eyes rolling back (mostly during sleep)    Prior Epilepsy History:  Collective epilepsy history 11/6/2014 was previously evaluated by Dr Saira Stuart including a hospitalization in February 2013 for status epilepticus, in the setting of missed doses of AEDs due to refusal to eat  She subsequently required anesthetic induced coma to stop her seizures and PEG tube was placed  She was seen almost every month for management of her seizures up until November 2013  She has medically refractory seizures and had a VNS placed possibly in the early 2000s and a generator changed in 2011  Unknown AEDs that were tried but felbamate is listed as an allergy  In 2011, her AEDs were clonazepam, levetiracetam, Depakote and Lamictal  Dr Saira Stuart had started [facility] in 2011  In 2012, Lenozuleyka Dovek was added with the reduction of clonazepam  There were difficulty with documenting seizure frequency; but seizures were no longer daily occurrences but there were clusters of seizures  There would be good periods and bad periods of seizure frequency  Dr Saira Stuart had been increasing the duty cycle of the VNS over the course of 2012 to 2013  Sometime between August 2013 and October 2013, topiramate was introduced  She was in status epilepticus in 2013, she was on Keppra, Banzel, Onfi, and Lamictal  She had an EEG at some point that showed multifocal spike-wave and background attenuation  She has intermittent agitation and day time somnolence  When she was hospitalized for status epilepticus, she was started on methylphenidate to help wake her up  Intake history November 2014:  VPA level 127  Her Valproic Acid dose was previously 250mg q6hrs based on Dr Marino Vela notes  Since July 2014, she has been receiving VPA 500mg q6hrs   She has not been hospitalized since September 2013  She was previously ambulatory with therapy  She spends most of her time sleeping for the past couple of months  She has also been receiving lactulose for elevated ammonia levels  (older drugs VPA, LVT, LMG, newer drugs added on since 2011 RFN, Onfi, TPM)     Summary of 2015: We decreased VPA from TDD 2000mg to 1000mg with increased agitation/combativeness, alternating days of exhaustion (no behavioral specialist has been involved)  are randomly reported by multiple staff members  Seizures are typically reported by CNA's or her one to one on the 3PM to 11PM shift  Seizures are described a brief events of eyes rolling back and arms going up in the air, followed by hair pulling or slapping herself  These seizures were reported as either sporadic or every other day episodes  There have been no documented grand mal seizures or drop attacks  She refuses to wear safety helmet, rips at her hair, and attempts to flip herself out of her chair and bed  Lawrence Raymond can walk briefly but walks on her toes, she frequently will allow herself to fall down when she does not want to walk  It does not appear that methylphenidate has been useful in maintaining her level of wakefulness during the day  VNS generator change on 11/3/2015  The Model 103 (serial [id number]) was replaced with Kabam Model 105, serial [id number]  Weaned off of levetiracetam due to multiple medications and agitation; by the end of 2016, she was down to -500  Summary of 2016:  Started with RFN [id number], -250, CLB 0 5-0 5-25,  QID, -200 attempted to wean off of LEV and reduced the dose of VPA given that there were no reports of seizures and she was sedated, along with elevated ammonia levels  It was unclear as to how effective LEV was for her seizure control  When she missed a dose of TPM in September 2016, she had multiple seizures    We attempted to compensate for increase in seizures by increasing Onfi to 20-20; however, it seems that it made her more sedated  Summary of 2017  RFN [id number], -250, Onfi 20-20, -200,  q8hrs  She has been more somnolent  She continues to have tonic seizures when she is asleep, eyes rolling body, arms will tense up with some twitching, last a few seconds, but will recur  There are no seizures during the daytime  We attempted to wean off of VPA due to ammonia / somnolence; but there was an increase in tonic seizures (tonic stiffness, eyes rolling upwards, and subtle arm (right?) jerking)  Hospital admission 10/10-10/26/2017, due to seizures in setting of infection, was put on continuous video EEG monitoring to determine her seizure type, seizure frequency, and rapid medication adjustments  Rapid med changes: d/c'ed lamotrigine, restarted levetiracetam, attempted discontinuation of valproic acid (more seizures, so restarted), attempted reduction in Onfi, and starting Fycompa  The patient's seizures were mostly based during sleep, tonic seizures  Summary of 2018  Started with RFN [id number], PER 8, LEV 3712-7902, CLB 5-15, -250,  TID  Due to excessive somnolence Onfi was weaned off  She was on continuous EEG monitoring when she was in hospital for PNA that showed very frequent tonic seizures during sleep  VPA is causing hyperammoniemia  She has had more admissions for somnolence / lethargy, VPA was reduced for transaminitis  She was tested for inborn error of metabolism with plasma amino acid, urine organic acid, and acylcarnitine levels  There were nonspecific elevations in some amino acid or organic acid but no pattern consistent with a specific inborn error of metabolism  Elevated carnitine level was consistent with supplementation  Higher doses of phenobarbital may also contribute to sedation, phenobarbital was reduced to 20mg but on follow-up she was on 20mL of oral solution (80mg / day)      There is variable reporting in the frequency of tonic seizures (these are the eyes are rolling back and shaking, then stop, then happen again in 10-15 minutes)  Her level of alertness is also variable with erratic sleep cycle  Summary 2019  We started the year with  TID, RFN [id number], -200, LEV 2951-1349, PER 10, PB 40 qHS  In December 2018, Hospital admission for septic shock and aspiration PNA  She had an EEG that captured recurrent tonic seizures during sleep but this is consistently found in all of her other EEG studies  Another hospital admission January 2019 for recurrent convulsive seizures  Due to sedation Phenobarbital was weaned off and Fycompa was increased to 10mg at bedtime  TPM increased to 250-250  Tried to wean off of VPA due to ammonia levels  We started Epidiolex in the Spring 2019, which seems to have improved seizure control  January 2019 - Her CNA reports that [facility] is no longer [facility], she is essentially bed ridden  She does not eat and does not walk  [facility] usually participate in activities, walking, and eating food (if she was at a [facility] she would be able to eat a hamburger without difficulty)  I was able to speak to her father Carrie Ibrahim and he reported that he too saw a significant decline in mental status  There was a phone call from Nahid Tsai reporting an increase in seizure activity (brief episodes tonic-myoclonic jerking); but subsequent reports did not notice an increase in activity  Weaned off of phenobarbital, suspecting that it was causing elevated transaminases  There is variable periods of wakefulness and alertness; seizures are reported sporadically, sometimes she seems to have clusters of seizures (during an office visit there were about 8 tonic seizures lasting 15-20 seconds)  We weaned her off of valproic acid and increased Epidiolex  June 2019, hospitalized for status epilepticus (multiple clinical seizures, every 5-6 minutes, associated with apnea)  Continuous EEG monitoring showed very frequent 15-30 seconds tonic seizures , whether this is different from her baseline is difficult to determine  Since she was on continuous EEG monitoring a number of medication trials were given  At one point it seems that lorazepam and more levetiracetam had improved seizures  Her tube feed was adjust to more protein and lower carbohydrates  2019 - multiple phone calls regarding recurrent seizures (generalized shaking, tonic body rigidity and clonic activity)    Summary 2020  RUF [id number], -250, LEV 1000 q8hr, -400, JESSICA 50-50, CLB 5 qHS  Replacement of her VNS in December 2019, [facility] [address] to [facility]  Seizure reporting had been sporadic  Despite medication adjustments, there has been no improvement in degree of wakefulness, nonverbal, calls out at times  She does not do much  Transitioned LEV to Brivaracetam   Increased clobazam to 10mg at bedtime on 5/28/2020  Unclear if there has been improvement in seizure frequency; but there were no admissions to hospital for seizures  Summary 2021  RUF [id number], -250, -400, JESSICA 75-75, CLB 5-10  She was admitted to Hospital on 2/2/2021 for multiple seizures in one day  Honora Arash was increased with an extra 5mg tab in AM   With the increase in Honora Arash, there has been no report of excessive sedation  She continues to have stretches of non-24 hour sleep cycle  She has periods when she appears to be active, smiling and laughter and periods when she has no interactions with those around her  There was a couple of days when she did not have Epidiolex due to the weather, but there was no worsening of her seizures  There have been no report of diarrhea  Other seizures including with her arms raised up and stay up  There is mild improvement with current medications, less frequent reports of seizures  Her seizures are very difficult to notice, unless someone is with her    We had tried to reduce her Brivaracetam dose as there was no clear improvement at higher dose and she has been losing weight  We started Vimpat at the end of the year  Special Features  Status epilepticus: yes  Self Injury Seizures: No  Precipitating Factors: menstrual cycle? ? Epilepsy Risk Factors:  Abnormal pregnancy: unknown  Abnormal birth/: unknown  Abnormal Development: unknown developmental history  Febrile seizures, simple: unknown  Febrile seizures, complex: unknown  CNS infection: No  Mental retardation: Yes  Cerebral palsy: Yes  Head injury (moderate/severe): possibly anoxic brain injury  CNS neoplasm: No  CNS malformation: No  Neurosurgical procedure: No  Stroke: No  Alcohol abuse: No  Drug abuse: No  Family history Sz/epilepsy: unknown    Prior AEDs:  Rufinamide  Clobazam (excessive sedation)  Clonazepam  Levetiracetam (unclear if beneficial)  Lamotrigine (unclear if beneficial)  Topiramate (missed doses caused breakthrough convulsive type of seizures)  Valproate (elevated ammonia levels)  Fycompa (excess sedation)  Felbamate (listed as a drug allergy)  Phenobarbital (contributing to sedation)  Epidiolex (seems to help the most)  Brivaracetam    Prior workup:  x   Imaging:  CT head 2013, 2018  No acute intracranial pathology    3/20/2019  MRI brain w/wo contrast  Normal MR brain study  Symmetric hippocampal formations    EEGs:  Continuous video EEG monitoring 10/13 - 10/15/2017  Frequent generalized spike/polyspike-slow wave complexes during sleep  At times there are independent right more than left midtemporal spikes and bitemporal spikes    Innumerable generalized tonic seizures during sleep, generalized 1 Hz period discharges, that would become continuous for 30 seconds or generalized rhythmic alpha-beta discharges, associated with patient becoming rigid, tonic posturing with arms elevated and tense with subtle jerking of the upper body, eyes opening with upward deviation    Ictal patterns:  1 - Seconds of diffuse electrodecrement then paroxysmal fast activity followed by 2Hz spike wave discharges (clinically accompanied by posturing of the arms, then clonic jerking)    Continuous video EEG monitoring 10/18 - 10/25/2017  Diffuse background slowing with bursts of arrhythmic generalized spike wave discharges, with shifting lateralization, and independent left and  right temporal spikes  Mild eyelid myoclonia associated with brief bursts of 2-2 5 Hz generalized discharges  Tonic seizures with eelctrodecrement  There were innumerable tonic seizures; however by 10/25/2017 the frequency of these seizures did decrease in frequency  Continuous video EEG monitoring 12/1-12/5/2017  There are innumerable tonic seizures that are generally less than one minute in duration (30-40 seconds), these consists of subtle eye opening and tonic stiffening of arms, if longer then whole body stiffening with clonic jerking movements  These almost always occur exclusively out of sleep  These consist of 2-2 5 Hz generalized spike-slow wave complexes with generalized attenuation of activity (desynchronization) or paroxysmal fast activity  Per 24 hours count of seizures could be      12/19/2018 routine study  Frequent recurrent tonic seizures associated with paroxysmal generalized fast activity then generalized rhythmic discharges associated with eyes upward deviation, and myoclonic/clonic jerking of the upper body, excess beta activity  6/28-7/3/2019 continuous video EEG monitoring  Frequent clusters of tonic seizures (body rigidity, head flexions, eyes go up with dysconjugate gaze, and clonic activity of the arms for a few seconds), these are associated with GPFA and rhythmic spike-slow waves  There are also bilateral temporal focal epileptiform discharges      Labs:  7/14/2020  Clobazam 37ng/mL  N-Desmethylclobazam 1,585 ng/mL    5/21/2020  Topiramate 7 5mcg/mL    Component      Latest Ref Rng & Units 2/2/2021 2/5/2021 WBC      4 31 - 10 16 Thousand/uL 7 85 5 29   Red Blood Cell Count      3 81 - 5 12 Million/uL 4 08 3 56 (L)   Hemoglobin      11 5 - 15 4 g/dL 13 2 11 6   HCT      34 8 - 46 1 % 41 2 36 5   Platelet Count      390 - 390 Thousands/uL 161 154   Sodium      136 - 145 mmol/L 144 142   Potassium      3 5 - 5 3 mmol/L 3 7 3 5   Chloride      100 - 108 mmol/L 108 108   CO2      21 - 32 mmol/L 22 20 (L)   Anion Gap      4 - 13 mmol/L 14 (H) 14 (H)   BUN      5 - 25 mg/dL 22 8   Creatinine      0 60 - 1 30 mg/dL 0 63 0 44 (L)   Glucose, Random      65 - 140 mg/dL 159 (H) 87   Calcium      8 3 - 10 1 mg/dL 9 4 8 4   CORRECTED CALCIUM      8 3 - 10 1 mg/dL  9 1   AST      5 - 45 U/L 53 (H) 32   ALT      12 - 78 U/L 95 (H) 71   Alkaline Phosphatase      46 - 116 U/L 121 (H) 93   Total Protein      6 4 - 8 2 g/dL 8 2 6 4   Albumin      3 5 - 5 0 g/dL 3 7 3 1 (L)   TOTAL BILIRUBIN      0 20 - 1 00 mg/dL 0 30 0 30   eGFR      ml/min/1 73sq m 113 128   Total CK      26 - 192 U/L 107      General exam   Blood pressure 110/72, pulse 105, temperature 97 6 °F (36 4 °C), temperature source Tympanic, resp  rate 18, SpO2 98 %    Appearance: she looks like she is sleeping with her eyes open, she is unresponsive, nonverbal  Carotids: not assessed  Cardiovascular: regular rate and rhythm and normal heart sounds  Pulmonary: clear to auscultation   Abdomen: soft nontender, nondistended  Extremities: no edema, warm to touch    HEENT: moist mucus membranes, dysconjugate gaze   Fundoscopy: not assessed    Mental status  Orientation: excessively sedated  Fund of Knowledge: nonverbal   Attention and Concentration: nonverbal  Current and Remote Memory:nonverbal  Language: nonverbal    Cranial Nerves  CN 1: not tested  CN 2: she does not appear to track objects and blinks to threat and pupils equal round reactive to direct and consenual light   CN 3, 4, 6: left eye is more exotropic, no nystagmus  CN 5:corneal reflex is intact symmetrically  CN 7: muscles of facial expression are symmetric  CN 8:not assessed  CN 9, 10:not assessed  CN 11:not assessed  CN 12:not assessed    Motor:  Bulk, Tone: low tone throughout all joints of the arms and legs  Pronation: not assessed  Strength: unable to test strength due to excessive sedation  Abnormal movements: She had a brief tonic seizure for less than 10 seconds, back and neck arched, eyes deviated upwards, breath pause in breathing, when the seizure ended she had rhonchus sounds    Sensory:  Lighttouch: there is no reaction or withdrawal of feet to noxious stimulation    Coordination: excessively sedated, unable to follow instructions    Reflexes: bilateral biceps, triceps, brachioradialis 2+/4  Knees and ankles 0/4    Past Medical/Surgical History:  Patient Active Problem List   Diagnosis    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[facility] [address] [facility]    Underweight    Osteoporosis    Onychomycosis    Hyperkeratosis    [facility]erebral anoxic injury ([facility] [address] [facility]    Intractable epilepsy with status epilepticus ([facility] [address] [facility]    Somnolence    Low blood pressure    Incontinence    Skin breakdown    MRSA colonization    Right atrial enlargement    Hypophosphatemia    Sedated due to multiple medications    Breast mass    S/P placement of VNS (vagus nerve stimulation) device    Moderate protein-calorie malnutrition (HCC)    Dislodged gastrostomy tube    Fatty infiltration of liver    Intellectual disability with epilepsy ([facility] [address] [facility]    Labyrinthine disorder    Labial cyst    PEG tube malfunction (HCC)     Past Surgical History:   Procedure Laterality Date    ABDOMINAL SURGERY      CARDIAC PACEMAKER PLACEMENT      vns implant l chest    IR GASTROSTOMY TUBE PLACEMENT  11/21/2019    IR THORACENTESIS  12/17/2018    JEJUNOSTOMY FEEDING TUBE      history of - most recently, PEG tube    PEG TUBE PLACEMENT      WA IMP STIM,CRANIAL,SUBQ,1 ARRAY Left 12/18/2019    Procedure: REPLACEMENT IMPLANTABLE PULSE GENERATOR FOR VAGAL NERVE STIMULATOR, LEFT CHEST; Surgeon: Odilia Pineda MD;  Location: [facility];  Service: Neurosurgery     Past Psychiatric History:  History of behavioral agitation but she is no longer on a one to one because she is generally too sedated  Medications:    Current Outpatient Medications:     acetaminophen (TYLENOL) 325 mg tablet, Take 650 mg by mouth every 6 (six) hours as needed for mild pain or fever, Disp: , Rfl:     bisacodyl (BISCOLAX) 10 mg suppository, Insert 10 mg into the rectum daily at bedtime as needed for constipation (if no BM day #4), Disp: , Rfl:     bisacodyl (FLEET BISACODYL) 10 MG/30ML ENEM, Insert 10 mg into the rectum as needed for constipation Give at hs on day 5 if suppository is not effective, Disp: , Rfl:     Brivaracetam (Briviact) 10 MG/ML SOLN oral solution, 5 mL (50 mg total) by Per PEG Tube route every 12 (twelve) hours, Disp: 300 mL, Rfl: 5    cannabidiol (Epidiolex) 100 mg/ml SOLN, 4 5 mL (450 mg total) by Per G Tube route 2 (two) times a day, Disp: 270 mL, Rfl: 5    cloBAZam (ONFI) 10 MG tablet, 1 tablet (10 mg total) by Per G Tube route daily at bedtime, Disp: 30 tablet, Rfl: 5    diazepam (VALIUM) 5 MG/ML solution, If the patient has a seizure lasting more than 3 minutes then give 1mL by PEG tube, may repeat 1mL if she continues to have seizure for more than 10 minutes  , Disp: 30 mL, Rfl: 1    lacosamide (Vimpat) 10 mg/mL, Give by G-tube, give 10mL every 12 hours  , Disp: 600 mL, Rfl: 5    magnesium hydroxide (MILK OF MAGNESIA) 400 mg/5 mL oral suspension, Take 30 mL by mouth daily as needed for constipation If not BM by day 3, Disp: , Rfl:     melatonin 3 mg, 6 mg by Per G Tube route daily at bedtime, Disp: , Rfl:     Multiple Vitamins-Minerals (MULTIVITAMIN WITH IRON-MINERALS) liquid, 5 mL by Per G Tube route daily, Disp: , Rfl:     Probiotic Product (ALEXANDER-BID PROBIOTIC PO), 1 tablet by Per G Tube route daily  , Disp: , Rfl:     rufinamide (BANZEL) 400 mg tablet, 4 tablets (1,600 mg total) by Per G Tube route every 12 (twelve) hours, Disp: 240 tablet, Rfl: 5    topiramate (TOPAMAX) 50 MG tablet, 5 tablets (250 mg total) by Per G Tube route every 12 (twelve) hours, Disp: 300 tablet, Rfl: 5    VITAMIN D PO, Take 1,000 mg by mouth in the morning Given in her G-tube, Disp: , Rfl:     Allergies: Allergies   Allergen Reactions    Felbamate        Family history:  History reviewed  No pertinent family history  There is no family history of seizure, epilepsy or developmental delay  Social History  Living situation:  Resident of [facility]   reports that she has never smoked  She has never used smokeless tobacco  She reports previous alcohol use  She reports that she does not use drugs  Review of Systems  A review of at least 12 organ/systems was obtained by the medical assistant and reviewed by me, including additional positives/negatives:  Genitourinary: Negative  Negative for dysuria, frequency and urgency  Incontinent of bal and urine    Musculoskeletal: Negative for myalgias and neck pain  Total non weightbearing    Skin: Negative  Negative for rash  Neurological: Positive for weakness (whole body)  Negative for dizziness, tremors, seizures, syncope, facial asymmetry, speech difficulty, light-headedness, numbness and headaches  Takes her hands and bangs it on her head   Hematological: Negative  Does not bruise/bleed easily  Psychiatric/Behavioral: Positive for behavioral problems (at times)  Negative for confusion, hallucinations and sleep disturbance  Decision making was of high-complexity due to the patient's high risk condition (seizures), psychiatric and neuropsychological comorbidities, behavioral problems, memory and cognitive problems and medication side effects  The total amount of time spent with the patient along with pre-chart and post-chart preparation was 45 minutes on the calendar day of the date of service    This included history taking, physical exam, review of ancillary testing, counseling provided to the patient regarding diagnosis, medications, treatment, and risk management, and other communication to the patient's providers and/or family  Start time: 11:45AM  End time: 12:30PM    Neurology/Epilepsy Procedure Note  VNS Interrogation and reprogramming    Model: SenTiva   S/N: [id number]  Date of implant: 12/18/2019    Current settings:  Output Current 2 0 mAmp   Signal Frequency 20 Hz   Pulse Width 250 microsec   Signal On Time 30 sec   Signal Off Time 1 1 min     AutoStimulation settings:  AutoStim Output 2 0 mAmp   Pulse Width 500 microsec   AutoStim On Time 30 sec     Magnet settings:  Mag Output 2 25 mAmp   Pulse Width 500 microsec   Mag On Time 60 sec     Tachycardia Detection:  enabled  Heartbeat verification: not done  Heartbeat Sensitivity:  4  Threshold:  40%-20%(reprogrammed the threshold)    Diagnostics:  Communication OK  Output current 2 0mAmp  Lead Impedance OK  Impedance value 1987 Ohms  IFI OK  Battery indicator 50-75%    Lifetime Magnet activation 3  % stimulation 35%    The [place] was queried  No red flags were identified  The patient is low risk for abuse of the prescribed diazepam and clobazam medication  Safe to proceed with prescription

## 2022-03-03 NOTE — PROGRESS NOTES
Review of Systems   Constitutional: Negative  Negative for appetite change and fever  HENT: Negative  Negative for hearing loss, tinnitus, trouble swallowing and voice change  Eyes: Negative  Negative for photophobia and pain  Respiratory: Negative  Negative for shortness of breath  Cardiovascular: Negative  Negative for palpitations  Gastrointestinal: Negative  Negative for nausea and vomiting  Endocrine: Negative  Negative for cold intolerance  Genitourinary: Negative  Negative for dysuria, frequency and urgency  Incontinent of bal and urine    Musculoskeletal: Negative for myalgias and neck pain  Total non weightbearing    Skin: Negative  Negative for rash  Neurological: Positive for weakness (whole body)  Negative for dizziness, tremors, seizures, syncope, facial asymmetry, speech difficulty, light-headedness, numbness and headaches  Takes her hands and bangs it on her head   Hematological: Negative  Does not bruise/bleed easily  Psychiatric/Behavioral: Positive for behavioral problems (at times)  Negative for confusion, hallucinations and sleep disturbance

## 2022-03-03 NOTE — PATIENT INSTRUCTIONS
Plan:   Medications are given by PEG  1 - Continue Epidiolex 100mg/mL 4 5 mL (450mg) every 12 hours (Goes to [facility] specialty pharmacy)  2 - Continue Briviact 10mg/mL 5mL (50mg) every 12 hours  3 - continue with Topiramate 250mg every 12 hours  4 - continue Banzel 400mg give 4 tabs every 12 hours  5 - Change clobazam to 10mg one tab at bedtime  6 - Increase Vimpat 10mg/mL give 10mL every 12 hours    7 - give diazepam 5mg/mL 1 mL as needed for seizure last more than 3 minutes and repeat second dose for seizure lasting more than 10 minutes  8 - check blood work:  Clobazam level, lacosamide level, topiramate level  9 - Call the office if there is increase in seizure frequency  10 - follow-up in 3 months

## 2022-03-14 ENCOUNTER — TELEPHONE (OUTPATIENT)
Dept: NEUROLOGY | Facility: CLINIC | Age: 42
End: 2022-03-14

## 2022-03-14 DIAGNOSIS — G40.812 LENNOX-GASTAUT SYNDROME WITH TONIC SEIZURES (HCC): ICD-10-CM

## 2022-03-14 NOTE — TELEPHONE ENCOUNTER
Message left on nursing line regarding epidiolex dose  Patient had 9lb weightloss  [facility] asking if you are okay with current dose as it is technically over max dose for patient weight  Current dose: 4 5ml BID    [facility]: [phone]

## 2022-03-14 NOTE — TELEPHONE ENCOUNTER
What is her current weight? We are not able to measure her weight in the office because she comes in a stretcher and she cannot stand unsupported

## 2022-03-16 NOTE — TELEPHONE ENCOUNTER
Current weight is approximately 40kg  Recommended Epidiolex maximal dose is 400mg twice a day  I'll change her Epidiolex to 4mL per G tube twice a day

## 2022-03-17 NOTE — TELEPHONE ENCOUNTER
Spoke to [employer] at [facility]  Informed of med change  They will make note on their records  Advised new script was sent to pharmacy

## 2022-04-01 ENCOUNTER — HOSPITAL ENCOUNTER (EMERGENCY)
Facility: HOSPITAL | Age: 42
Discharge: HOME | End: 2022-04-01
Attending: EMERGENCY MEDICINE
Payer: MEDICARE

## 2022-04-01 ENCOUNTER — APPOINTMENT (EMERGENCY)
Dept: RADIOLOGY | Facility: HOSPITAL | Age: 42
End: 2022-04-01
Payer: MEDICARE

## 2022-04-01 VITALS
RESPIRATION RATE: 16 BRPM | OXYGEN SATURATION: 96 % | TEMPERATURE: 98.1 F | HEART RATE: 68 BPM | HEIGHT: 60 IN | DIASTOLIC BLOOD PRESSURE: 72 MMHG | SYSTOLIC BLOOD PRESSURE: 137 MMHG | BODY MASS INDEX: 19.39 KG/M2 | WEIGHT: 98.77 LBS

## 2022-04-01 DIAGNOSIS — Z46.59 ENCOUNTER FOR FEEDING TUBE PLACEMENT: ICD-10-CM

## 2022-04-01 DIAGNOSIS — T85.528A DISLODGED GASTROSTOMY TUBE: Primary | ICD-10-CM

## 2022-04-01 PROCEDURE — 99285 EMERGENCY DEPT VISIT HI MDM: CPT | Performed by: EMERGENCY MEDICINE

## 2022-04-01 PROCEDURE — 99283 EMERGENCY DEPT VISIT LOW MDM: CPT

## 2022-04-01 PROCEDURE — 43762 RPLC GTUBE NO REVJ TRC: CPT | Performed by: EMERGENCY MEDICINE

## 2022-04-01 PROCEDURE — 74018 RADEX ABDOMEN 1 VIEW: CPT

## 2022-04-01 RX ADMIN — IOHEXOL 50 ML: 240 INJECTION, SOLUTION INTRATHECAL; INTRAVASCULAR; INTRAVENOUS; ORAL at 18:23

## 2022-04-01 NOTE — ED PROVIDER NOTES
Final Diagnosis:  1  Dislodged gastrostomy tube    2  Encounter for feeding tube placement        Chief Complaint   Patient presents with    Feeding Tube Problem     patient pulled feeding tube out about 30 minutes prior to arrival      HPI  Patient presents with removal of PEG tube  Patient has history of intractable seizures and is on multiple anti epileptics which are provided through G-tube  She is nonverbal at baseline  Staff at home state that the patient removed her G-tube approximately 30 minutes prior to evaluation  Patient had her current G-tube placed over the summer  Unless otherwise specified:  - No language barrier    - History obtained from patient  - There are no limitations to the history obtained  - Previous charting was reviewed    PMH:   has a past medical history of ADHD, Anoxic brain damage ([facility] [address] ), Autistic disorder, Breakthrough seizure ([facility] [address] ) (8/1/2019), Dysphagia, Dysphagia, oropharyngeal phase, Gastrostomy tube dependent ([facility] [address] ), Hyperammonemia ([address] ), Hyperkeratosis, Hypotension, Intellectual disability, Lennox-Gastaut syndrome with tonic seizures ([facility] [address] ), Lethargy, Liver enzyme elevation, Onychomycosis, Osteoporosis, and Osteoporosis  PSH:   has a past surgical history that includes Jejunostomy feeding tube; PEG tube placement; Abdominal surgery; IR thoracentesis (12/17/2018); Cardiac pacemaker placement; IR gastrostomy tube placement (11/21/2019); and pr imp stim,cranial,subq,1 array (Left, 12/18/2019)  ROS:  Review of Systems   - nonverbal  - 13 point ROS was performed and all are normal unless stated in the history above  - Nursing note reviewed  Vitals reviewed  - Orders placed by myself and/or advanced practitioner / resident  PE:   Vitals:    04/01/22 1735   BP: 116/85   BP Location: Right arm   Pulse: 102   Resp: 16   Temp: 98 1 °F (36 7 °C)   TempSrc: Temporal   SpO2: 97%   Weight: 44 8 kg (98 lb 12 3 oz)   Height: 5' (1 524 m)     Vitals reviewed by me  Patient is nonverbal at baseline  Tends to rest in the right lateral decubitus position  Does move extremities on her own but does not follow commands  No obvious signs of trauma  Abdomen is thin, no obvious tenderness with palpation  She has an old scar from previous G-tube which is well-healed  Current G-tube site is inferior to this  Small amount of blood  Overall hemostatic  Unless otherwise specified above:    General: VS reviewed  Appears in NAD    Head: Normocephalic, atraumatic  Eyes: EOM-I  No exudate  ENT: Atraumatic external nose and ears  No malocclusion  No stridor  No drooling  Neck: No JVD  CV: No pallor noted  Lungs:   No tachypnea  No respiratory distress    Abdomen:  Soft, non-tender, non-distended    MSK:   FROM spontaneously  No obvious deformity    Skin: Dry, intact  No obvious rash  Neuro: Awake, alert, GCS15, CN II-XII grossly intact  Speaking in full sentences  Motor grossly intact  Psychiatric/Behavioral: Appropriate mood and affect   Exam: deferred    Physical Exam     Feeding Tube    Date/Time: 4/1/2022 6:24 PM  Performed by: Conrado Guerra MD  Authorized by: Conrado Guerra MD   Universal Protocol:  Consent: Verbal consent not obtained  Patient identity confirmed: arm band      Patient location:  ED  Pre-procedure details: Old tube type:  Gastrostomy    Old tube size:  18 Fr  Indications:     Indications: tube dislodged    Anesthesia (see MAR for exact dosages): Anesthesia method:  None  Procedure details:     Patient position:  R lateral decubitus    Procedure type:  Replacement    Tube type:  Gastrostomy    Tube size:  18 Fr    Bulb inflation volume:  8 ml    Bulb inflation fluid:  Normal saline  Post-procedure details:     Placement/position confirmation:  X-ray and contrast    Placement difficulty:  None    Bleeding:  None    Patient tolerance of procedure:   Tolerated well, no immediate complications       A:  - Nursing note reviewed  XR abdomen 1 vw portable   ED Interpretation   G-tube appears to deliver contrast to stomach on my interpretation      Final Result   The gastrostomy tube lies in the stomach  Opacification of the stomach seen with injection of the contrast through the G-tube  The balloon likely lies in the region of the fundus area         Workstation performed: [id number]           Orders Placed This Encounter   Procedures    FEEDING TUBE REPLACEMENT    XR abdomen 1 vw portable     Labs Reviewed - No data to display      Final Diagnosis:  1  Dislodged gastrostomy tube    2  Encounter for feeding tube placement        P:  - PEG tube was replaced bedside  Review records showed that previously was a [address] [place]  I placed an [address] [place] into the existing tract  Will verify with x-ray  -x-ray showed tube in the appropriate position  Patient will be discharged back to living facility  Medications   iohexol (OMNIPAQUE) 240 MG/ML solution 50 mL (50 mL Oral Given 4/1/22 7593)     Time reflects when diagnosis was documented in both MDM as applicable and the Disposition within this note     Time User Action Codes Description Comment    4/1/2022  6:49 PM Milana Martinez Add [O65 750L] Dislodged gastrostomy tube     4/1/2022  6:49 PM Milana Martinez Add [Z46 [id number]] Encounter for feeding tube placement       ED Disposition     ED Disposition Condition Date/Time Comment    Discharge Stable Fri Apr 1, 2022  6:49 PM [facility]  Follow-up Information     Follow up With Specialties Details Why [address] [facility]   [facility] [id number]          Patient's Medications   Discharge Prescriptions    No medications on file     No discharge procedures on file  Prior to Admission Medications   Prescriptions Last Dose Informant Patient Reported? Taking?    Brivaracetam (Briviact) 10 MG/ML SOLN oral solution   No No   Si mL (50 mg total) by Per PEG Tube route every 12 (twelve) hours   Multiple Vitamins-Minerals (MULTIVITAMIN WITH IRON-MINERALS) liquid  Outside Facility (Specify) Yes No   Si mL by Per G Tube route daily   Probiotic Product (ALEXANDER-BID PROBIOTIC PO)  Outside Facility (Specify) Yes No   Si tablet by Per G Tube route daily     VITAMIN D PO   Yes No   Sig: Take 1,000 mg by mouth in the morning Given in her G-tube   acetaminophen (TYLENOL) 325 mg tablet   Yes No   Sig: Take 650 mg by mouth every 6 (six) hours as needed for mild pain or fever   bisacodyl (BISCOLAX) 10 mg suppository  Outside Facility (Specify) Yes No   Sig: Insert 10 mg into the rectum daily at bedtime as needed for constipation (if no BM day #4)   bisacodyl (FLEET BISACODYL) 10 MG/30ML ENEM   Yes No   Sig: Insert 10 mg into the rectum as needed for constipation Give at hs on day 5 if suppository is not effective   cannabidiol (Epidiolex) 100 mg/ml SOLN   No No   Si mL (400 mg total) by Per G Tube route 2 (two) times a day   cloBAZam (ONFI) 10 MG tablet   No No   Si tablet (10 mg total) by Per G Tube route daily at bedtime   diazepam (VALIUM) 5 MG/ML solution   No No   Sig: If the patient has a seizure lasting more than 3 minutes then give 1mL by PEG tube, may repeat 1mL if she continues to have seizure for more than 10 minutes  lacosamide (Vimpat) 10 mg/mL   No No   Sig: Give by G-tube, give 10mL every 12 hours     magnesium hydroxide (MILK OF MAGNESIA) 400 mg/5 mL oral suspension  Outside Facility (Specify) Yes No   Sig: Take 30 mL by mouth daily as needed for constipation If not BM by day 3   melatonin 3 mg   Yes No   Si mg by Per G Tube route daily at bedtime   rufinamide (BANZEL) 400 mg tablet   No No   Si tablets (1,600 mg total) by Per G Tube route every 12 (twelve) hours   topiramate (TOPAMAX) 50 MG tablet   No No   Si tablets (250 mg total) by Per G Tube route every 12 (twelve) hours Facility-Administered Medications: None       Portions of the record may have been created with voice recognition software  Occasional wrong word or "sound a like" substitutions may have occurred due to the inherent limitations of voice recognition software  Read the chart carefully and recognize, using context, where substitutions have occurred      Electronically signed by:  MD Patricia Stahl MD  04/01/22 3459

## 2022-06-09 ENCOUNTER — APPOINTMENT (EMERGENCY)
Dept: RADIOLOGY | Facility: HOSPITAL | Age: 42
End: 2022-06-09
Payer: MEDICARE

## 2022-06-09 ENCOUNTER — HOSPITAL ENCOUNTER (EMERGENCY)
Facility: HOSPITAL | Age: 42
Discharge: HOME | End: 2022-06-10
Attending: EMERGENCY MEDICINE
Payer: MEDICARE

## 2022-06-09 VITALS
RESPIRATION RATE: 20 BRPM | SYSTOLIC BLOOD PRESSURE: 104 MMHG | OXYGEN SATURATION: 98 % | TEMPERATURE: 97.1 F | DIASTOLIC BLOOD PRESSURE: 60 MMHG | HEART RATE: 99 BPM

## 2022-06-09 DIAGNOSIS — Z46.59 ENCOUNTER FOR FEEDING TUBE PLACEMENT: ICD-10-CM

## 2022-06-09 DIAGNOSIS — T85.528A DISLODGED GASTROSTOMY TUBE: Primary | ICD-10-CM

## 2022-06-09 PROCEDURE — 99284 EMERGENCY DEPT VISIT MOD MDM: CPT | Performed by: PHYSICIAN ASSISTANT

## 2022-06-09 PROCEDURE — 74018 RADEX ABDOMEN 1 VIEW: CPT

## 2022-06-09 PROCEDURE — 43762 RPLC GTUBE NO REVJ TRC: CPT | Performed by: PHYSICIAN ASSISTANT

## 2022-06-09 PROCEDURE — 99283 EMERGENCY DEPT VISIT LOW MDM: CPT

## 2022-06-09 RX ORDER — RUFINAMIDE 200 MG/1
1600 TABLET, FILM COATED ORAL 2 TIMES DAILY WITH MEALS
Status: DISCONTINUED | OUTPATIENT
Start: 2022-06-09 | End: 2022-06-10

## 2022-06-09 RX ORDER — LACOSAMIDE 50 MG/1
100 TABLET ORAL EVERY 12 HOURS SCHEDULED
Status: DISCONTINUED | OUTPATIENT
Start: 2022-06-09 | End: 2022-06-10

## 2022-06-09 RX ADMIN — IOHEXOL 50 ML: 240 INJECTION, SOLUTION INTRATHECAL; INTRAVASCULAR; INTRAVENOUS; ORAL at 20:48

## 2022-06-09 NOTE — ED NOTES
Provider and student at bedside  18 fr feeding tube placed in room with supporting equipment by this RN  Unknown how long tube has been removed for  Provider to attempt replacement        June Cartwright RN  06/09/22 6806

## 2022-06-09 NOTE — ED PROVIDER NOTES
History  Chief Complaint   Patient presents with    Feeding Tube Problem     [facility] in from [facility] after pulling out feeding tube  40year old female with PMH Lennox-Gastaut syndrome, epilepsy, intellectual disability presents via EMS from NH for evaluation and replacement of G tube  She has a history of pulling out her feeding tube  It is unclear how long the tube has been out, however staff does administer her medications through her G tube and they were unable to do so today  Pt is non verbal at baseline  Unable to provide or participate in history  Upon review of records, it appears her last G tube was replaced in April of this year after a similar dislodgement  History provided by:  Medical records  History limited by:  Patient nonverbal   used: No    Feeding Tube Problem  Chronicity:  New      Prior to Admission Medications   Prescriptions Last Dose Informant Patient Reported? Taking?    Brivaracetam (Briviact) 10 MG/ML SOLN oral solution   No No   Si mL (50 mg total) by Per PEG Tube route every 12 (twelve) hours   Multiple Vitamins-Minerals (MULTIVITAMIN WITH IRON-MINERALS) liquid  Outside Facility (Specify) Yes No   Si mL by Per G Tube route daily   Probiotic Product (ALEXANDER-BID PROBIOTIC PO)  Outside Facility (Specify) Yes No   Si tablet by Per G Tube route daily     VITAMIN D PO   Yes No   Sig: Take 1,000 mg by mouth in the morning Given in her G-tube   acetaminophen (TYLENOL) 325 mg tablet   Yes No   Sig: Take 650 mg by mouth every 6 (six) hours as needed for mild pain or fever   bisacodyl (BISCOLAX) 10 mg suppository  Outside Facility (Specify) Yes No   Sig: Insert 10 mg into the rectum daily at bedtime as needed for constipation (if no BM day #4)   bisacodyl (FLEET BISACODYL) 10 MG/30ML ENEM   Yes No   Sig: Insert 10 mg into the rectum as needed for constipation Give at hs on day 5 if suppository is not effective   cannabidiol (Epidiolex) 100 mg/ml SOLN   No No   Si mL (400 mg total) by Per G Tube route 2 (two) times a day   cloBAZam (ONFI) 10 MG tablet   No No   Si tablet (10 mg total) by Per G Tube route daily at bedtime   diazepam (VALIUM) 5 MG/ML solution   No No   Sig: If the patient has a seizure lasting more than 3 minutes then give 1mL by PEG tube, may repeat 1mL if she continues to have seizure for more than 10 minutes  lacosamide (Vimpat) 10 mg/mL   No No   Sig: Give by G-tube, give 10mL every 12 hours     magnesium hydroxide (MILK OF MAGNESIA) 400 mg/5 mL oral suspension  Outside Facility (Specify) Yes No   Sig: Take 30 mL by mouth daily as needed for constipation If not BM by day 3   melatonin 3 mg   Yes No   Si mg by Per G Tube route daily at bedtime   rufinamide (BANZEL) 400 mg tablet   No No   Si tablets (1,600 mg total) by Per G Tube route every 12 (twelve) hours   topiramate (TOPAMAX) 50 MG tablet   No No   Si tablets (250 mg total) by Per G Tube route every 12 (twelve) hours      Facility-Administered Medications: None       Past Medical History:   Diagnosis Date    ADHD     Anoxic brain damage (HCC)     Autistic disorder     Breakthrough seizure ([facility] ) 2019    Dysphagia     Dysphagia, oropharyngeal phase     Gastrostomy tube dependent ([facility] )     14 Fr as of 2020    Hyperammonemia (HCC)     Hyperkeratosis     Hypotension     Intellectual disability     Lennox-Gastaut syndrome with tonic seizures (HCC)     Lethargy     Liver enzyme elevation     Onychomycosis     Osteoporosis     Osteoporosis        Past Surgical History:   Procedure Laterality Date    ABDOMINAL SURGERY      CARDIAC PACEMAKER PLACEMENT      vns implant l chest    IR GASTROSTOMY TUBE PLACEMENT  2019    IR THORACENTESIS  2018    JEJUNOSTOMY FEEDING TUBE      history of - most recently, PEG tube    PEG TUBE PLACEMENT      MD IMP STIM,CRANIAL,SUBQ,1 ARRAY Left 2019    Procedure: REPLACEMENT IMPLANTABLE PULSE GENERATOR FOR VAGAL NERVE STIMULATOR, LEFT CHEST;  Surgeon: Marcella Linares MD;  Location: [facility];  Service: Neurosurgery       History reviewed  No pertinent family history  I have reviewed and agree with the history as documented  E-Cigarette/Vaping    E-Cigarette Use Never User      E-Cigarette/Vaping Substances    Nicotine No     THC No     CBD No     Flavoring No     Other No     Unknown No      Social History     Tobacco Use    Smoking status: Never Smoker    Smokeless tobacco: Never Used   Vaping Use    Vaping Use: Never used   Substance Use Topics    Alcohol use: Never    Drug use: Never       Review of Systems   Unable to perform ROS: Patient nonverbal   All other systems reviewed and are negative  Physical Exam  Physical Exam  Vitals and nursing note reviewed  Constitutional:       General: She is not in acute distress  Appearance: She is not toxic-appearing  Comments: Patient is nonverbal at baseline  Tends to rest in the right lateral decubitus position  Does move extremities on her own but does not follow commands  No obvious signs of trauma  HENT:      Head: Normocephalic and atraumatic  Right Ear: External ear normal       Left Ear: External ear normal       Mouth/Throat:      Mouth: Mucous membranes are moist       Pharynx: Oropharynx is clear  Eyes:      General: Lids are normal       Conjunctiva/sclera: Conjunctivae normal    Neck:      Trachea: Trachea normal    Cardiovascular:      Rate and Rhythm: Normal rate and regular rhythm  Pulses: Normal pulses  Heart sounds: Normal heart sounds, S1 normal and S2 normal  No murmur heard  Pulmonary:      Effort: Pulmonary effort is normal  No tachypnea  Breath sounds: Normal breath sounds  No wheezing or rhonchi  Abdominal:      General: Bowel sounds are normal  There is no distension  Palpations: Abdomen is soft  Tenderness: There is no abdominal tenderness   There is no guarding  Comments: Abdomen is thin, no obvious tenderness with palpation  She has an old scar from previous G-tube which is well-healed  Current G-tube site is inferior to this and appears open but narrow  Small amount of blood  Overall hemostatic  Musculoskeletal:      Right lower leg: No edema  Left lower leg: No edema  Skin:     General: Skin is warm and dry  Capillary Refill: Capillary refill takes less than 2 seconds  Findings: No rash  Neurological:      Mental Status: She is alert  Mental status is at baseline  Psychiatric:         Speech: She is noncommunicative  Behavior: Behavior normal  Behavior is cooperative  Vital Signs  ED Triage Vitals   Temperature Pulse Respirations Blood Pressure SpO2   06/09/22 1912 06/09/22 1912 06/09/22 1912 06/09/22 1915 06/09/22 1912   (!) 97 1 °F (36 2 °C) 94 16 100/62 99 %      Temp Source Heart Rate Source Patient Position - Orthostatic VS BP Location FiO2 (%)   06/09/22 1912 06/09/22 1912 06/09/22 1912 06/09/22 1912 --   Tympanic Monitor Lying Left arm       Pain Score       --                  Vitals:    06/09/22 1912 06/09/22 1915   BP:  100/62   Pulse: 94    Patient Position - Orthostatic VS: Lying          Visual Acuity      ED Medications  Medications   iohexol (OMNIPAQUE) 240 MG/ML solution 50 mL (50 mL Oral Given 6/9/22 2048)       Diagnostic Studies  Results Reviewed     None                 XR abdomen 1 view kub    (Results Pending)              Procedures  Feeding Tube    Date/Time: 6/9/2022 7:45 PM  Performed by: Grazyna Aponte PA-C  Authorized by: Grazyna Aponte PA-C   [place] Protocol:  Consent: Verbal consent not obtained  Site marked: the operative site was marked  Required items: required blood products, implants, devices, and special equipment available  Patient identity confirmed: arm band and provided demographic data      Patient location:  ED  Pre-procedure details:      Old tube type: Gastrostomy    Old tube size:  18 Fr  Indications:     Indications: tube dislodged    Anesthesia (see MAR for exact dosages): Anesthesia method:  None  Procedure details:     Patient position:  R lateral decubitus    Procedure type:  Replacement    Tube type:  Gastrojejunostomy    Tube size:  16 Fr    Bulb inflation volume:  8cc    Bulb inflation fluid:  Normal saline  Post-procedure details:     Placement/position confirmation:  Gastric contents aspirated and x-ray    Placement difficulty:  Minimal    Bleeding:  Minimal    Patient tolerance of procedure: Tolerated well, no immediate complications             ED Course  ED Course as of 06/09/22 2210   Thu Jun 09, 2022 2051 Omnipaque was flushed into G tube by me and placement confirmed with KUB  Initially tried to replaced tube with 18Fr however was not able to advance this through the current tract  A 16Fr G tube was then successfully placed  See procedure note above  Abdominal binder then reapplied  Advised outpatient follow up with PCP or return to ER for change in condition as outlined  Pt discharged back to nursing home                                          MDM  Number of Diagnoses or Management Options  Dislodged gastrostomy tube: new and requires workup  Encounter for feeding tube placement: new and requires workup     Amount and/or Complexity of Data Reviewed  Clinical lab tests: reviewed  Tests in the radiology section of CPT®: ordered and reviewed  Decide to obtain previous medical records or to obtain history from someone other than the patient: yes  Obtain history from someone other than the patient: yes  Review and summarize past medical records: yes  Independent visualization of images, tracings, or specimens: yes    Patient Progress  Patient progress: improved      Disposition  Final diagnoses:   Dislodged gastrostomy tube   Encounter for feeding tube placement     Time reflects when diagnosis was documented in both MDM as applicable and the Disposition within this note     Time User Action Codes Description Comment    6/9/2022  8:13 PM Tobias Schultz Add [Q24 209N] Dislodged gastrostomy tube     6/9/2022  8:13 PM Tobias Schultz Add [Z46 59] Encounter for feeding tube placement       ED Disposition     ED Disposition   Discharge    Condition   Stable    Date/Time   Thu Jun 9, 2022  8:51 PM    Comment   [facility] discharge to home/self care  Follow-up Information     Follow up With Specialties Details Why Contact Info Additional Information    [facility] Emergency Department Emergency Medicine  As needed [phone]  [facility] Emergency Department, [address], [facility]          Patient's Medications   Discharge Prescriptions    No medications on file       No discharge procedures on file      PDMP Review       Value Time User    PDMP Reviewed  Yes 3/3/2022 12:24 PM Abhijit Campos MD          ED Provider  Electronically Signed by           Jose Thompson PA-C  06/09/22 1732

## 2022-06-10 NOTE — DISCHARGE INSTRUCTIONS
Maira's feeding tube has been replaced  Roz Mccrary has not received her evening medications in the ER and will need to receive them once she is back at her nursing home

## 2022-06-10 NOTE — ED NOTES
Incontinence care complete, linens changed  Warm blanket provided        Nemo Mccann, ZEE  06/09/22 1292

## 2022-06-10 NOTE — ED NOTES
[facility] called for transport back to [address], awaiting call back at this time      Vanessa Anderson RN  06/09/22 2057

## 2022-06-28 ENCOUNTER — TELEPHONE (OUTPATIENT)
Dept: NEUROLOGY | Facility: CLINIC | Age: 42
End: 2022-06-28

## 2022-06-28 NOTE — TELEPHONE ENCOUNTER
Called and spoke to [facility] confirmed patients upcoming apt with [facility] on 7/12/22 @ 10 am  I also faced over the scripts for the patient to have her blood work completed  Patient has no issues or concerns at this time

## 2022-07-08 NOTE — SOCIAL WORK
Pt being dc'd back to [facility] on this date  Med stat ambulance to transport pt around [place] in admissions department at [facility] aware as is pt's father  AVS sent over to [facility] 
Pt is a resident from [facility]  Pt is a 15 day MA bed hold   Pt is a 30 day readmission   This admission is due to pt's diagnosis of  Intractable epilepsy with know seizure disorder which is unrelated to previous admission  Barrier to care include pt has a very complex neurologic disease which is difficult to manage 
no

## 2022-07-21 NOTE — PROGRESS NOTES
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, Lennox Gastaut Syndrome;   Her seizures are generally very subtle as they can last a few seconds and can be missed  However, there have been no report of generalized tonic clonic seizures  Her EEG studies show both generalized onset and potential risk for focal onset seizures  Prior visits showed excessive sedation (she is sensitive to phenobarbital and benzodiazepines)  She is on maximal doses of Epidiolex, topiramate, rufinamide without improvement in tonic seizure frequency  I'm hoping that the addition of Vimpat/lacosamide can improve her chances on weaning off of clobazam     Will continue dose reduction of clobazam   The last time she was admitted to hospital occurred when clobazam was reduced to 5mg daily dose  I'm hoping that the addition of lacosamide will improve her chances on coming off of clobazam        Plan:   Medications are given by PEG  1 - Continue Epidiolex 100mg/mL 4 0 mL (400mg) every 12 hours (Goes to [facility] specialty pharmacy)  2 - Continue Briviact 10mg/mL 5mL (50mg) every 12 hours  3 - continue with Topiramate 250mg every 12 hours  4 - continue Banzel 400mg give 4 tabs every 12 hours  5 - Change clobazam to 10mg HALF a tab (5mg) at bedtime  6 - Increase Vimpat 10mg/mL give 15mL every 12 hours    7 - give diazepam 5mg/mL 1 mL as needed for seizure last more than 3 minutes and repeat second dose for seizure lasting more than 10 minutes  8 - Call the office if there is increase in seizure frequency  9 - follow-up in 3 months with advanced practitioner      Problem List Items Addressed This Visit    None     Visit Diagnoses     Lennox-Gastaut syndrome with tonic seizures ([facility] [address] )        Relevant Medications topiramate (TOPAMAX) 50 MG tablet    rufinamide (BANZEL) 400 mg tablet    cloBAZam (ONFI) 10 MG tablet    lacosamide (Vimpat) 10 mg/mL    Brivaracetam (Briviact) 10 MG/ML SOLN oral solution        Chief Complaint:    Chief Complaint     Seizures        HPI:    Orlena Duane is a 40 y  o female here for follow-up evaluation of intractable epilepsy in the setting of LGS  Interval History 7/21/2022  I called [facility] and spoke with Rox, the unit nurse  She has not seen any recent seizures  There have been no report regarding seizures  She is not any different with the reduction in the dose of clobazam   She sleeps throughout the day but there are times she is more active  She has poor core strength  She may scream or yell throughout the day  AED/side effects/compliance:  Banzel 400mg give 4 tabs twice a day   Topiramate 250mg tab twice a day   Epidiolex 400mg twice a day  Briviact 10mg/mL 50mg twice a day  Lacosamide 100-100  Onfi 10 qHS    Event/Seizure semiology:  Seizure semiology:  1  She was described to have drop attacks or spells with grunting sounds and falling to the floor  2  Her nurse commented on episodes of spasms or myoclonic jerking of the right arm  3  Generalized convulsions  4  Tonic seizures of eyes rolling back (mostly during sleep)    Prior Epilepsy History:  Collective epilepsy history 11/6/2014 was previously evaluated by Dr Glenford Denver including a hospitalization in February 2013 for status epilepticus, in the setting of missed doses of AEDs due to refusal to eat  She subsequently required anesthetic induced coma to stop her seizures and PEG tube was placed  She was seen almost every month for management of her seizures up until November 2013  She has medically refractory seizures and had a VNS placed possibly in the early 2000s and a generator changed in 2011  Unknown AEDs that were tried but felbamate is listed as an allergy   In 2011, her AEDs were clonazepam, levetiracetam, Depakote and Lamictal  Dr Glenford Denver had started [facility] in 2011  In 2012, [place] was added with the reduction of clonazepam  There were difficulty with documenting seizure frequency; but seizures were no longer daily occurrences but there were clusters of seizures  There would be good periods and bad periods of seizure frequency  Dr Glenford Denver had been increasing the duty cycle of the VNS over the course of 2012 to 2013  Sometime between August 2013 and October 2013, topiramate was introduced  She was in status epilepticus in 2013, she was on Keppra, Banzel, Onfi, and Lamictal  She had an EEG at some point that showed multifocal spike-wave and background attenuation  She has intermittent agitation and day time somnolence  When she was hospitalized for status epilepticus, she was started on methylphenidate to help wake her up  Intake history November 2014:  VPA level 127  Her Valproic Acid dose was previously 250mg q6hrs based on Dr Gigi Olvera notes  Since July 2014, she has been receiving VPA 500mg q6hrs  She has not been hospitalized since September 2013  She was previously ambulatory with therapy  She spends most of her time sleeping for the past couple of months  She has also been receiving lactulose for elevated ammonia levels  (older drugs VPA, LVT, LMG, newer drugs added on since 2011 RFN, Onfi, TPM)     Summary of 2015: We decreased VPA from TDD 2000mg to 1000mg with increased agitation/combativeness, alternating days of exhaustion (no behavioral specialist has been involved)  are randomly reported by multiple staff members  Seizures are typically reported by CNA's or her one to one on the 3PM to 11PM shift  Seizures are described a brief events of eyes rolling back and arms going up in the air, followed by hair pulling or slapping herself  These seizures were reported as either sporadic or every other day episodes  There have been no documented grand mal seizures or drop attacks   She refuses to wear safety helmet, rips at her hair, and attempts to flip herself out of her chair and bed  Dell Hayward can walk briefly but walks on her toes, she frequently will allow herself to fall down when she does not want to walk  It does not appear that methylphenidate has been useful in maintaining her level of wakefulness during the day  VNS generator change on 11/3/2015  The Model 103 (serial [id number]) was replaced with Verimatrix Model 105, serial [id number]  Weaned off of levetiracetam due to multiple medications and agitation; by the end of 2016, she was down to -500  Summary of 2016:  Started with RFN 2402-7154, -250, CLB 0 5-0 5-25,  QID, -200 attempted to wean off of LEV and reduced the dose of VPA given that there were no reports of seizures and she was sedated, along with elevated ammonia levels  It was unclear as to how effective LEV was for her seizure control  When she missed a dose of TPM in September 2016, she had multiple seizures  We attempted to compensate for increase in seizures by increasing Onfi to 20-20; however, it seems that it made her more sedated  Summary of 2017  RFN 6184-1964, -250, Onfi 20-20, -200,  q8hrs  She has been more somnolent  She continues to have tonic seizures when she is asleep, eyes rolling body, arms will tense up with some twitching, last a few seconds, but will recur  There are no seizures during the daytime  We attempted to wean off of VPA due to ammonia / somnolence; but there was an increase in tonic seizures (tonic stiffness, eyes rolling upwards, and subtle arm (right?) jerking)  Hospital admission 10/10-10/26/2017, due to seizures in setting of infection, was put on continuous video EEG monitoring to determine her seizure type, seizure frequency, and rapid medication adjustments    Rapid med changes: d/c'ed lamotrigine, restarted levetiracetam, attempted discontinuation of valproic acid (more seizures, so restarted), attempted reduction in [address], and starting Fycompa  The patient's seizures were mostly based during sleep, tonic seizures  Summary of 2018  Started with RFN [id number], PER 8, LEV 6949-1497, CLB 5-15, -250,  TID  Due to excessive somnolence Onfi was weaned off  She was on continuous EEG monitoring when she was in hospital for PNA that showed very frequent tonic seizures during sleep  VPA is causing hyperammoniemia  She has had more admissions for somnolence / lethargy, VPA was reduced for transaminitis  She was tested for inborn error of metabolism with plasma amino acid, urine organic acid, and acylcarnitine levels  There were nonspecific elevations in some amino acid or organic acid but no pattern consistent with a specific inborn error of metabolism  Elevated carnitine level was consistent with supplementation  Higher doses of phenobarbital may also contribute to sedation, phenobarbital was reduced to 20mg but on follow-up she was on 20mL of oral solution (80mg / day)  There is variable reporting in the frequency of tonic seizures (these are the eyes are rolling back and shaking, then stop, then happen again in 10-15 minutes)  Her level of alertness is also variable with erratic sleep cycle  Summary 2019  We started the year with  TID, RFN 1783-6421, -200, LEV 3082-7612, PER 10, PB 40 qHS  In December 2018, Hospital admission for septic shock and aspiration PNA  She had an EEG that captured recurrent tonic seizures during sleep but this is consistently found in all of her other EEG studies  Another hospital admission January 2019 for recurrent convulsive seizures  Due to sedation Phenobarbital was weaned off and Fycompa was increased to 10mg at bedtime  TPM increased to 250-250  Tried to wean off of VPA due to ammonia levels  We started Epidiolex in the Spring 2019, which seems to have improved seizure control      January 2019 - Her CNA reports that Vasquez Mackay is no longer Vasquez Mackay, she is essentially bed ridden  She does not eat and does not walk  Vasquez Mackay usually participate in activities, walking, and eating food (if she was at a [facility] she would be able to eat a hamburger without difficulty)  I was able to speak to her father Homer Pearce and he reported that he too saw a significant decline in mental status  There was a phone call from [address] reporting an increase in seizure activity (brief episodes tonic-myoclonic jerking); but subsequent reports did not notice an increase in activity  Weaned off of phenobarbital, suspecting that it was causing elevated transaminases  There is variable periods of wakefulness and alertness; seizures are reported sporadically, sometimes she seems to have clusters of seizures (during an office visit there were about 8 tonic seizures lasting 15-20 seconds)  We weaned her off of valproic acid and increased Epidiolex  June 2019, hospitalized for status epilepticus (multiple clinical seizures, every 5-6 minutes, associated with apnea)  Continuous EEG monitoring showed very frequent 15-30 seconds tonic seizures , whether this is different from her baseline is difficult to determine  Since she was on continuous EEG monitoring a number of medication trials were given  At one point it seems that lorazepam and more levetiracetam had improved seizures  Her tube feed was adjust to more protein and lower carbohydrates  2019 - multiple phone calls regarding recurrent seizures (generalized shaking, tonic body rigidity and clonic activity)    Summary 2020  RUF [id number], -250, LEV 1000 q8hr, -400, JESSICA 50-50, CLB 5 qHS  Replacement of her VNS in December 2019, [facility] [address] [facility]  Seizure reporting had been sporadic  Despite medication adjustments, there has been no improvement in degree of wakefulness, nonverbal, calls out at times  She does not do much    Transitioned LEV to Brivaracetam   Increased clobazam to 10mg at bedtime on 2020  Unclear if there has been improvement in seizure frequency; but there were no admissions to hospital for seizures  Summary   RUF 3678-8292, -250, -400, JESSICA 75-75, CLB 5-10  She was admitted to Hospital on 2021 for multiple seizures in one day  Joe Mule was increased with an extra 5mg tab in AM   With the increase in Joe Mule, there has been no report of excessive sedation  She continues to have stretches of non-24 hour sleep cycle  She has periods when she appears to be active, smiling and laughter and periods when she has no interactions with those around her  Her seizures are very difficult to notice, unless someone is with her  We had tried to reduce her Brivaracetam dose as there was no clear improvement at higher dose and she has been losing weight  We started Vimpat at the end of the year  Interval History 3/3/2022  RUF 9261-1790, -250, , BRV 50-50, CLB 5-10  Trisha Maravilla continues to decline in function; she is constantly sedated or sleepy, she may be awake for 30 minutes a day  She is sometimes on a play mat, she mostly lays there, sometimes she plays with her toys  One time she was able to say her name and clap her hands and laugh at something  She has moments of increased alertness and awareness  I spoke to Olinda the supervisor nurse for today  There are random episodes of seizure activity, which consists of body head rigidity, eyes opening up, sometimes mild twitching of the arms  She has been having more bruxism, when she is awake and asleep  It has been no generalized tonic clonic seizure for a while  The last time a rescue medication was needed was back in 2021  Special Features  Status epilepticus: yes  Self Injury Seizures: No  Precipitating Factors: menstrual cycle? ?     Epilepsy Risk Factors:  Abnormal pregnancy: unknown  Abnormal birth/: unknown  Abnormal Development: unknown developmental history  Febrile seizures, simple: unknown  Febrile seizures, complex: unknown  CNS infection: No  Mental retardation: Yes  Cerebral palsy: Yes  Head injury (moderate/severe): possibly anoxic brain injury  CNS neoplasm: No  CNS malformation: No  Neurosurgical procedure: No  Stroke: No  Alcohol abuse: No  Drug abuse: No  Family history Sz/epilepsy: unknown    Prior AEDs:  Rufinamide  Clobazam (excessive sedation)  Clonazepam  Levetiracetam (unclear if beneficial)  Lamotrigine (unclear if beneficial)  Topiramate (missed doses caused breakthrough convulsive type of seizures)  Valproate (elevated ammonia levels)  Fycompa (excess sedation)  Felbamate (listed as a drug allergy)  Phenobarbital (contributing to sedation)  Epidiolex (seems to help the most)  Brivaracetam  lacosamide    Prior workup:  x   Imaging:  CT head 2/21/2013, 9/7/2018  No acute intracranial pathology    3/20/2019  MRI brain w/wo contrast  Normal MR brain study  Symmetric hippocampal formations    EEGs:  Continuous video EEG monitoring 10/13 - 10/15/2017  Frequent generalized spike/polyspike-slow wave complexes during sleep  At times there are independent right more than left midtemporal spikes and bitemporal spikes    Innumerable generalized tonic seizures during sleep, generalized 1 Hz period discharges, that would become continuous for 30 seconds or generalized rhythmic alpha-beta discharges, associated with patient becoming rigid, tonic posturing with arms elevated and tense with subtle jerking of the upper body, eyes opening with upward deviation    Ictal patterns:  1 - Seconds of diffuse electrodecrement then paroxysmal fast activity followed by 2Hz spike wave discharges (clinically accompanied by posturing of the arms, then clonic jerking)    Continuous video EEG monitoring 10/18 - 10/25/2017  Diffuse background slowing with bursts of arrhythmic generalized spike wave discharges, with shifting lateralization, and independent left and  right temporal spikes  Mild eyelid myoclonia associated with brief bursts of 2-2 5 Hz generalized discharges  Tonic seizures with eelctrodecrement  There were innumerable tonic seizures; however by 10/25/2017 the frequency of these seizures did decrease in frequency  Continuous video EEG monitoring 12/1-12/5/2017  There are innumerable tonic seizures that are generally less than one minute in duration (30-40 seconds), these consists of subtle eye opening and tonic stiffening of arms, if longer then whole body stiffening with clonic jerking movements  These almost always occur exclusively out of sleep  These consist of 2-2 5 Hz generalized spike-slow wave complexes with generalized attenuation of activity (desynchronization) or paroxysmal fast activity  Per 24 hours count of seizures could be      12/19/2018 routine study  Frequent recurrent tonic seizures associated with paroxysmal generalized fast activity then generalized rhythmic discharges associated with eyes upward deviation, and myoclonic/clonic jerking of the upper body, excess beta activity  6/28-7/3/2019 continuous video EEG monitoring  Frequent clusters of tonic seizures (body rigidity, head flexions, eyes go up with dysconjugate gaze, and clonic activity of the arms for a few seconds), these are associated with GPFA and rhythmic spike-slow waves  There are also bilateral temporal focal epileptiform discharges      Labs:  1/20/2022   CBC 11 0/12/37/294  /3 6/108/24/15/0 4/132  LFT 7 3/3 1/0 3/16/24/116    7/15/2022  Lacosamide 4 3  topiramate 13 8  Clobazam 67 ng/mL  Desmethylclobazam 1300ng/mL    General exam   Blood pressure 96/58, pulse 76, temperature 97 7 °F (36 5 °C), temperature source Temporal   Appearance: awake, no sound from the patient  Carotids: not assessed  Cardiovascular: regular rate and rhythm and normal heart sounds  Pulmonary: clear to auscultation   Abdomen: nondistended  Extremities: there is no edema    HEENT: moist mucus membranes, dysconjugate gaze   Fundoscopy: not assessed    Mental status  Orientation: she seems to be more awake today compared to prior visits  Due to her intellectual disability, she is nonverbal so Fund of Knowledge, Attention and Concentration, and Memory cannot be tested  Language: nonverbal    Cranial Nerves  CN 1: not tested  CN 2: pupils are symmetric, round, and equally reactive to light   CN 3, 4, 6: no nystagmus is present  CN 5:corneal reflex is intact symmetrically  CN 7:muscles of facial expression are symmetric  CN 8:not assessed  CN 9, 10:not assessed  CN 11:not assessed  CN 12:not assessed    Motor:  Bulk, Tone: low tone throughout all joints of the arms and legs  Pronation: not assessed  Strength: She is on a gurney on the right decubitus position, strength testing is not possible  Abnormal movements: None    Sensory:  Lighttouch: she moves her feet to noxious stimulation at the bottom of her feet    Coordination:  There is no testable coordination    Reflexes:   Reflexes were not assessed    Past Medical/Surgical History:  Patient Active Problem List   Diagnosis    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[facility] [address] )    Underweight    Osteoporosis    Onychomycosis    Hyperkeratosis    Cerebral anoxic injury ([facility] [address] )    Intractable epilepsy with status epilepticus ([facility] [address] )    Somnolence    Low blood pressure    Incontinence    Skin breakdown    MRSA colonization    Right atrial enlargement    Hypophosphatemia    Sedated due to multiple medications    Breast mass    S/P placement of VNS (vagus nerve stimulation) device    Moderate protein-calorie malnutrition ([facility] [address] )    Dislodged gastrostomy tube    Fatty infiltration of liver    Intellectual disability with epilepsy ([facility] [address] )    Labyrinthine disorder    Labial cyst    PEG tube malfunction ([facility] [address] )     Past Surgical History:   Procedure Laterality Date    ABDOMINAL SURGERY      CARDIAC PACEMAKER PLACEMENT      vns implant l chest    IR GASTROSTOMY TUBE PLACEMENT  11/21/2019    IR THORACENTESIS  12/17/2018    JEJUNOSTOMY FEEDING TUBE      history of - most recently, PEG tube    PEG TUBE PLACEMENT      SD IMP STIM,CRANIAL,SUBQ,1 ARRAY Left 12/18/2019    Procedure: REPLACEMENT IMPLANTABLE PULSE GENERATOR FOR VAGAL NERVE STIMULATOR, LEFT CHEST;  Surgeon: Fina Torres MD;  Location: [facility];  Service: Neurosurgery     Past Psychiatric History:  History of behavioral agitation but she is no longer on a one to one because she is generally too sedated  Medications:    Current Outpatient Medications:     acetaminophen (TYLENOL) 325 mg tablet, Take 650 mg by mouth every 6 (six) hours as needed for mild pain or fever, Disp: , Rfl:     bisacodyl (DULCOLAX) 10 mg suppository, Insert 10 mg into the rectum daily at bedtime as needed for constipation (if no BM day #4), Disp: , Rfl:     bisacodyl (FLEET) 10 MG/30ML ENEM, Insert 10 mg into the rectum as needed for constipation Give at hs on day 5 if suppository is not effective, Disp: , Rfl:     Brivaracetam (Briviact) 10 MG/ML SOLN oral solution, 5 mL (50 mg total) by Per PEG Tube route every 12 (twelve) hours, Disp: 300 mL, Rfl: 5    cannabidiol (Epidiolex) 100 mg/ml SOLN, 4 mL (400 mg total) by Per G Tube route 2 (two) times a day, Disp: 240 mL, Rfl: 5    cloBAZam (ONFI) 10 MG tablet, 0 5 tablets (5 mg total) by Per G Tube route daily at bedtime, Disp: 15 tablet, Rfl: 5    diazepam (VALIUM) 5 MG/ML solution, If the patient has a seizure lasting more than 3 minutes then give 1mL by PEG tube, may repeat 1mL if she continues to have seizure for more than 10 minutes  , Disp: 30 mL, Rfl: 1    lacosamide (Vimpat) 10 mg/mL, 15 mL (150 mg total) by Per G Tube route every 12 (twelve) hours, Disp: 900 mL, Rfl: 5    magnesium hydroxide (MILK OF MAGNESIA) 400 mg/5 mL oral suspension, Take 30 mL by mouth daily as needed for constipation If not BM by day 3, Disp: , Rfl:     melatonin 3 mg, 6 mg by Per G Tube route daily at bedtime, Disp: , Rfl:     Multiple Vitamins-Minerals (MULTIVITAMIN WITH IRON-MINERALS) liquid, 5 mL by Per G Tube route daily, Disp: , Rfl:     Probiotic Product (ALEXANDER-BID PROBIOTIC PO), 1 tablet by Per G Tube route daily  , Disp: , Rfl:     rufinamide (BANZEL) 400 mg tablet, 4 tablets (1,600 mg total) by Per G Tube route every 12 (twelve) hours, Disp: 240 tablet, Rfl: 5    topiramate (TOPAMAX) 50 MG tablet, 5 tablets (250 mg total) by Per G Tube route every 12 (twelve) hours, Disp: 300 tablet, Rfl: 5    VITAMIN D PO, Take 1,000 mg by mouth in the morning Given in her G-tube, Disp: , Rfl:     Allergies: Allergies   Allergen Reactions    Felbamate      Family history:  History reviewed  No pertinent family history  There is no family history of seizure, epilepsy or developmental delay  Social History  Living situation:  Resident of [facility]   reports that she has never smoked  She has never used smokeless tobacco  She reports that she does not drink alcohol and does not use drugs  Neurology/Epilepsy Procedure Note  VNS Interrogation    Model:   S/N: [id number]  Date of implant: 12/18/2019    Current settings:  Output Current 2 0 mAmp   Signal Frequency 20 Hz (attempted to increase to 30 Hz, at the moment it was reset, she started to have severe coughing    I had to reset it back to 20 Hz)   Pulse Width 250 microsec   Signal On Time 30 sec   Signal Off Time 1 1 min     AutoStimulation settings:  AutoStim Output 2 0 mAmp   Pulse Width 500 microsec   AutoStim On Time 30 sec     Magnet settings:  Mag Output 2 25 mAmp   Pulse Width 500 microsec   Mag On Time 60 sec     Tachycardia Detection:  Enabled  Heartbeat verification: not done  Heartbeat Sensitivity:  4  Threshold:  20%    Diagnostics:  Communication OK   Output current 2 0mAmp  Lead Impedance OK  Impedance value 1913 Ohm  IFI OK  Battery indicator 50-75%    Lifetime Magnet activation 4  % stimulation 35%     Review of Systems  Review of systems not obtained due to patient factors she has severe intellectual disability and unable to report symptoms    Decision making was of high-complexity due to the patient's high risk condition (seizures), psychiatric and neuropsychological comorbidities, behavioral problems, memory and cognitive problems and medication side effects  The [place] was queried  No red flags were identified  The patient is low risk for abuse of the prescribed clobazam medication  Safe to proceed with prescription

## 2022-07-22 ENCOUNTER — OFFICE VISIT (OUTPATIENT)
Dept: NEUROLOGY | Facility: CLINIC | Age: 42
End: 2022-07-22
Payer: MEDICARE

## 2022-07-22 VITALS — DIASTOLIC BLOOD PRESSURE: 58 MMHG | HEART RATE: 76 BPM | SYSTOLIC BLOOD PRESSURE: 96 MMHG | TEMPERATURE: 97.7 F

## 2022-07-22 DIAGNOSIS — G40.812 LENNOX-GASTAUT SYNDROME WITH TONIC SEIZURES (HCC): ICD-10-CM

## 2022-07-22 PROCEDURE — 99214 OFFICE O/P EST MOD 30 MIN: CPT | Performed by: PSYCHIATRY & NEUROLOGY

## 2022-07-22 PROCEDURE — 95976 ALYS SMPL CN NPGT PRGRMG: CPT | Performed by: PSYCHIATRY & NEUROLOGY

## 2022-07-22 RX ORDER — BRIVARACETAM 10 MG/ML
50 SOLUTION ORAL EVERY 12 HOURS
Qty: 300 ML | Refills: 5 | Status: SHIPPED | OUTPATIENT
Start: 2022-07-22

## 2022-07-22 RX ORDER — RUFINAMIDE 400 MG/1
1600 TABLET, FILM COATED ORAL EVERY 12 HOURS
Qty: 240 TABLET | Refills: 5 | Status: SHIPPED | OUTPATIENT
Start: 2022-07-22

## 2022-07-22 RX ORDER — LACOSAMIDE 10 MG/ML
150 SOLUTION ORAL EVERY 12 HOURS SCHEDULED
Qty: 900 ML | Refills: 5 | Status: SHIPPED | OUTPATIENT
Start: 2022-07-22

## 2022-07-22 RX ORDER — CLOBAZAM 10 MG/1
5 TABLET ORAL
Qty: 15 TABLET | Refills: 5 | Status: SHIPPED | OUTPATIENT
Start: 2022-07-22 | End: 2022-10-24

## 2022-07-22 RX ORDER — TOPIRAMATE 50 MG/1
250 TABLET, FILM COATED ORAL EVERY 12 HOURS SCHEDULED
Qty: 300 TABLET | Refills: 5 | Status: SHIPPED | OUTPATIENT
Start: 2022-07-22

## 2022-07-22 NOTE — PATIENT INSTRUCTIONS
Plan:   Medications are given by PEG  1 - Continue Epidiolex 100mg/mL 4 5 mL (450mg) every 12 hours (Goes to [facility] specialty pharmacy)  2 - Continue Briviact 10mg/mL 5mL (50mg) every 12 hours  3 - continue with Topiramate 250mg every 12 hours  4 - continue Banzel 400mg give 4 tabs every 12 hours  5 - Change clobazam to 10mg HALF a tab (5mg) at bedtime  6 - Increase Vimpat 10mg/mL give 15mL every 12 hours    7 - give diazepam 5mg/mL 1 mL as needed for seizure last more than 3 minutes and repeat second dose for seizure lasting more than 10 minutes  8 - Call the office if there is increase in seizure frequency  9 - follow-up in 3 months with advanced practitioner

## 2022-07-25 ENCOUNTER — TELEPHONE (OUTPATIENT)
Dept: NEUROLOGY | Facility: CLINIC | Age: 42
End: 2022-07-25

## 2022-07-25 NOTE — TELEPHONE ENCOUNTER
Received vm from [facility] and Rehab  Patient was seen in office Friday 7/22 and had some medication changes  Patient experienced a small seizure yesterday, body tensed up and eyes rolled to the ceiling     #: [phone]    Called facility  They state seizure lasted 15 seconds, was one of her typical seizures  Body tensed, eyes rolled to ceiling  No residual effects  No injury as result of seizure  Medications:   epidiolex 450 mg BID  briviact 50 mg BID  topiramate 250 mg BID  banzel 400 mg 4 tabs BID  Clobazam 5 mg at bedtime   vimpat 15 mL BID     Any recommendations?      Please advise

## 2022-07-25 NOTE — TELEPHONE ENCOUNTER
Please note that there is a correction on her Epidiolex dose she should be on 400mg (4mL) twice a day  The After visit summary is in error  The changes over the weekend should have been in an increase in Vimpat from 10mL to 15mL twice a day and reduction of clobazam from 10mg to 5mg at bedtime  Before going back up on Clobazam, let's continue to monitor frequency of seizures  She has frequent seizures that are often missed  So occasional breakthrought tonic seizures lasting a few seconds is expected  If she has a cluster of seizures then it may be unusual   If she has more seizures during the day, then increase clobazam to 10mg at bedtime

## 2022-08-12 NOTE — TELEPHONE ENCOUNTER
called [facility] and spoke to Velia  made aware of below  Confirmed that pt is taking epidiolex 100mg/ml 4ml bid and as previously noted by beck the other changes were already implemented

## 2022-08-18 LAB — HBA1C MFR BLD HPLC: 5.3 %

## 2022-08-22 NOTE — PROGRESS NOTES
Pt currently intubated,RASS -2,FLACC 0, Jevity 1 2 @ 40 hr w/ 100 ml flush Q6 hrs via OG tube, peg tube being used for meds/clamped, restraints intact, nguyen catheter intact, ivf, cefepime/vanco/thiamine/ascorbic acid/flagyl, precedex drip  code: stroke

## 2022-09-22 DIAGNOSIS — G40.812 LENNOX-GASTAUT SYNDROME WITH TONIC SEIZURES (HCC): ICD-10-CM

## 2022-10-10 ENCOUNTER — TELEPHONE (OUTPATIENT)
Dept: NEUROLOGY | Facility: CLINIC | Age: 42
End: 2022-10-10

## 2022-10-10 NOTE — TELEPHONE ENCOUNTER
Called [facility] and spoke with staff who confirmed pt's upcoming appt on 10/24/2022

## 2022-10-11 NOTE — QUICK NOTE
Epileptologist On-Call note    I returned a call to Dr Ami Orellana in [facility] [place] regarding Dulce Caballero  She presented from her SNF because of more frequent seizures this morning  She was febrile today, but no missed doses or other clear trigger  On exam, she is noted to have occasional brief and self limited tonic seizures  I discussed that per prior records, she is known to have very frequent seizures at baseline, including when previously monitored on continuous EEG  Unless there is a more concerning clinical pattern or continuous seizure activity, her typical treatment for flurries of seizures is to look for any potential infectious or other exacerbating cause, and try to get her back to her baseline  One potential option would be to increase her Onfi to be 5 mg in the am and 10 mg at night       Maribell Demarco MD Ely Abrams  [facility]  CARDIOLOGY [address] [facility]  Scheduled Appointment: 10/20/2022    Manny Bryant  [facility]  CARDIOLOGY [address]  Scheduled Appointment: 11/10/2022

## 2022-10-21 ENCOUNTER — TELEPHONE (OUTPATIENT)
Dept: NEUROLOGY | Facility: CLINIC | Age: 42
End: 2022-10-21

## 2022-10-21 NOTE — TELEPHONE ENCOUNTER
Patient nursing home would like patient appointment on 10/24/22 at 8:15 with [facility] switch to virtual  They cannot make appointment time because of no transportation  Please send link to [email or web address]

## 2022-10-24 ENCOUNTER — TELEMEDICINE (OUTPATIENT)
Dept: NEUROLOGY | Facility: CLINIC | Age: 42
End: 2022-10-24
Payer: MEDICARE

## 2022-10-24 VITALS — BODY MASS INDEX: 18.47 KG/M2 | WEIGHT: 94.1 LBS | HEIGHT: 60 IN

## 2022-10-24 DIAGNOSIS — G40.814 INTRACTABLE LENNOX-GASTAUT SYNDROME WITHOUT STATUS EPILEPTICUS (HCC): Primary | ICD-10-CM

## 2022-10-24 PROCEDURE — 99214 OFFICE O/P EST MOD 30 MIN: CPT | Performed by: PHYSICIAN ASSISTANT

## 2022-10-24 RX ORDER — CLOBAZAM 2.5 MG/ML
SUSPENSION ORAL
Qty: 30 ML | Refills: 5 | Status: SHIPPED | OUTPATIENT
Start: 2022-10-24

## 2022-10-24 NOTE — PROGRESS NOTES
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, Lennox Gastaut Syndrome;  Her seizures are generally very subtle as they can last a few seconds and can be missed  However, there have been no report of generalized tonic clonic seizures  Her EEG studies show both generalized onset and potential risk for focal onset seizures  Prior visits showed excessive sedation (she is sensitive to phenobarbital and benzodiazepines)  She is on maximal doses of Epidiolex, topiramate, rufinamide without improvement in tonic seizure frequency  Vimpat/lacosamide was started in hopes to improve her chances of weaning off of clobazam      Since her last visit there have not been any clusters of seizures   She had one 30 second seizure last week, but prior to that, none were noted since July   We will continue with dose reduction of clobazam   They are currently cutting a 10mg tab in half to make 5mg   Nursing does not think it is feasible to cut this into quarters   I offered to d/c the clobazam completely, but her nurse is worried and wants to try a lower dose for the next few months   Clobazam comes in liquid form 2 5mg/mL, so will d/c tablets and start liquid 1mL at bedtime  Plan:   Medications are given by PEG  1 - Continue Epidiolex 100mg/mL 4 0 mL (400mg) every 12 hours (Goes to [facility] specialty pharmacy)  2 - Continue Briviact 10mg/mL 5mL (50mg) every 12 hours  3 - continue with Topiramate 250mg every 12 hours  4 - continue Banzel 400mg give 4 tabs every 12 hours  5 - Change clobazam-- will discontinue tablets (currently taking 1/2 of a 10mg tab HS), and start liquid clobazam 2 5mg/mL give 1mL at bedtime  6 - continue with Vimpat 10mg/mL give 15mL every 12 hours    7 - give diazepam 5mg/mL 1 mL as needed for seizure last more than 3 minutes and repeat second dose for seizure lasting more than 10 minutes  8 - Call the office if there is increase in seizure frequency  9 - follow-up in 3-4 months with Dr Alexey Ortez      Problem List Items Addressed This Visit        Nervous and Auditory    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[address] ) - Primary    Relevant Medications    cloBAZam (ONFI) oral suspension               Reason for visit is   Chief Complaint   Patient presents with   • Virtual Regular Visit        Encounter provider Steve Nelson PA-C    Provider located at [address] [id number]      Recent Visits  Date Type Provider Dept   10/21/22 Telephone Steve Nelson PA-C [facility] recent visits within past 7 days and meeting all other requirements  Today's Visits  Date Type Provider Dept   10/24/22 [address] [place] [address],  [address], [facility] [place]   Showing today's visits and meeting all other requirements  Future Appointments  No visits were found meeting these conditions  Showing future appointments within next 150 days and meeting all other requirements       The patient was identified by name and date of birth  Jonathan Wade was informed that this is a telemedicine visit and that the visit is being conducted through the [employer] platform  She agrees to proceed     My office door was closed  No one else was in the room  She acknowledged consent and understanding of privacy and security of the video platform  The patient has agreed to participate and understands they can discontinue the visit at any time  Patient is aware this is a billable service  Subjective    HPI   Jonathan Wade is a 40 y  o female here for follow-up evaluation of intractable epilepsy in the setting of LGS  Interval History 10/24/2022  Patient presents on video with her nurse from [facility], who is very familiar with her    At timing of last visit, lacosamide was increased and clobazam decreased (with plan to eventually come off clobazam)  They called the office shortly after her last visit reporting about a 15 second typical seizure  No medication changes were made and they were advised to monitor seizure frequency  Rox tells me today that she did not have another seizure until last week on 10/20  She had about a 30 second seizure  There has not been any clustering of seizures  She does not seem excessively sedated, is usually “alert and screaming to us” throughout the day  She does take cat naps, but sleeps well through the night  No recent illness, infections, or other new medical issues/med changes  Per Rox, she had a CMP and Hb level done in August, will send to us  Staff has no concerns today  AED/side effects/compliance:  Banzel 400mg give 4 tabs twice a day   Topiramate 250mg tab twice a day   Epidiolex 400mg twice a day  Briviact 10mg/mL 50mg twice a day  Lacosamide 150-150  Onfi 5 qHS     Event/Seizure semiology:  Seizure semiology:  1  She was described to have drop attacks or spells with grunting sounds and falling to the floor  2  Her nurse commented on episodes of spasms or myoclonic jerking of the right arm  3  Generalized convulsions  4  Tonic seizures of eyes rolling back (mostly during sleep)     Prior Epilepsy History:  Collective epilepsy history 11/6/2014 was previously evaluated by Dr Nic Osman including a hospitalization in February 2013 for status epilepticus, in the setting of missed doses of AEDs due to refusal to eat  She subsequently required anesthetic induced coma to stop her seizures and PEG tube was placed  She was seen almost every month for management of her seizures up until November 2013  She has medically refractory seizures and had a VNS placed possibly in the early 2000s and a generator changed in 2011  Unknown AEDs that were tried but felbamate is listed as an allergy   In 2011, her AEDs were clonazepam, levetiracetam, Depakote and Lamictal  Dr Venancio León had started [facility] in 2011  In 2012, Alexandria Walden was added with the reduction of clonazepam  There were difficulty with documenting seizure frequency; but seizures were no longer daily occurrences but there were clusters of seizures  There would be good periods and bad periods of seizure frequency  Dr Venancio León had been increasing the duty cycle of the VNS over the course of 2012 to 2013  Sometime between August 2013 and October 2013, topiramate was introduced  She was in status epilepticus in 2013, she was on Keppra, Banzel, Onfi, and Lamictal  She had an EEG at some point that showed multifocal spike-wave and background attenuation  She has intermittent agitation and day time somnolence  When she was hospitalized for status epilepticus, she was started on methylphenidate to help wake her up  Intake history November 2014:  VPA level 127  Her Valproic Acid dose was previously 250mg q6hrs based on Dr Sabina Funes notes  Since July 2014, she has been receiving VPA 500mg q6hrs  She has not been hospitalized since September 2013  She was previously ambulatory with therapy  She spends most of her time sleeping for the past couple of months  She has also been receiving lactulose for elevated ammonia levels  (older drugs VPA, LVT, LMG, newer drugs added on since 2011 RFN, Onfi, TPM)      Summary of 2015: We decreased VPA from TDD 2000mg to 1000mg with increased agitation/combativeness, alternating days of exhaustion (no behavioral specialist has been involved)  are randomly reported by multiple staff members  Seizures are typically reported by CNA's or her one to one on the 3PM to 11PM shift  Seizures are described a brief events of eyes rolling back and arms going up in the air, followed by hair pulling or slapping herself  These seizures were reported as either sporadic or every other day episodes  There have been no documented grand mal seizures or drop attacks   She refuses to wear safety helmet, rips at her hair, and attempts to flip herself out of her chair and bed  Zulma Jenkins can walk briefly but walks on her toes, she frequently will allow herself to fall down when she does not want to walk  It does not appear that methylphenidate has been useful in maintaining her level of wakefulness during the day  VNS generator change on 11/3/2015  The Model 103 (serial [id number]) was replaced with [employer] Model 105, serial [id number]  Weaned off of levetiracetam due to multiple medications and agitation; by the end of 2016, she was down to -500  Summary of 2016:  Started with RFN [id number], -250, CLB 0 5-0 5-25,  QID, -200 attempted to wean off of LEV and reduced the dose of VPA given that there were no reports of seizures and she was sedated, along with elevated ammonia levels  It was unclear as to how effective LEV was for her seizure control  When she missed a dose of TPM in September 2016, she had multiple seizures  We attempted to compensate for increase in seizures by increasing Onfi to 20-20; however, it seems that it made her more sedated  Summary of 2017  RFN [id number], -250, Onfi 20-20, -200,  q8hrs  She has been more somnolent  She continues to have tonic seizures when she is asleep, eyes rolling body, arms will tense up with some twitching, last a few seconds, but will recur  There are no seizures during the daytime  We attempted to wean off of VPA due to ammonia / somnolence; but there was an increase in tonic seizures (tonic stiffness, eyes rolling upwards, and subtle arm (right?) jerking)  Hospital admission 10/10-10/26/2017, due to seizures in setting of infection, was put on continuous video EEG monitoring to determine her seizure type, seizure frequency, and rapid medication adjustments   Rapid med changes: d/c'ed lamotrigine, restarted levetiracetam, attempted discontinuation of valproic acid (more seizures, so restarted), attempted reduction in [address], and starting Fycompa  The patient's seizures were mostly based during sleep, tonic seizures  Summary of 2018  Started with RFN [id number], PER 8, LEV 2094-8735, CLB 5-15, -250,  TID  Due to excessive somnolence Onfi was weaned off  She was on continuous EEG monitoring when she was in hospital for PNA that showed very frequent tonic seizures during sleep  VPA is causing hyperammoniemia  She has had more admissions for somnolence / lethargy, VPA was reduced for transaminitis  She was tested for inborn error of metabolism with plasma amino acid, urine organic acid, and acylcarnitine levels  There were nonspecific elevations in some amino acid or organic acid but no pattern consistent with a specific inborn error of metabolism  Elevated carnitine level was consistent with supplementation  Higher doses of phenobarbital may also contribute to sedation, phenobarbital was reduced to 20mg but on follow-up she was on 20mL of oral solution (80mg / day)  There is variable reporting in the frequency of tonic seizures (these are the eyes are rolling back and shaking, then stop, then happen again in 10-15 minutes)  Her level of alertness is also variable with erratic sleep cycle  Summary 2019  We started the year with  TID, RFN [id number], -200, LEV 6967-6935, PER 10, PB 40 qHS  In December 2018, Hospital admission for septic shock and aspiration PNA  She had an EEG that captured recurrent tonic seizures during sleep but this is consistently found in all of her other EEG studies  Another hospital admission January 2019 for recurrent convulsive seizures  Due to sedation Phenobarbital was weaned off and Fycompa was increased to 10mg at bedtime  TPM increased to 250-250  Tried to wean off of VPA due to ammonia levels  We started Epidiolex in the Spring 2019, which seems to have improved seizure control       January 2019 - Her CNA reports that Vineet is no longer Maira, she is essentially bed ridden  She does not eat and does not walk  [place]osa Pier usually participate in activities, walking, and eating food (if she was at a [facility] she would be able to eat a hamburger without difficulty)  I was able to speak to her father Charisma Patel and he reported that he too saw a significant decline in mental status  There was a phone call from Nahid Tsai reporting an increase in seizure activity (brief episodes tonic-myoclonic jerking); but subsequent reports did not notice an increase in activity  Weaned off of phenobarbital, suspecting that it was causing elevated transaminases  There is variable periods of wakefulness and alertness; seizures are reported sporadically, sometimes she seems to have clusters of seizures (during an office visit there were about 8 tonic seizures lasting 15-20 seconds)  We weaned her off of valproic acid and increased Epidiolex  June 2019, hospitalized for status epilepticus (multiple clinical seizures, every 5-6 minutes, associated with apnea)  Continuous EEG monitoring showed very frequent 15-30 seconds tonic seizures , whether this is different from her baseline is difficult to determine  Since she was on continuous EEG monitoring a number of medication trials were given  At one point it seems that lorazepam and more levetiracetam had improved seizures  Her tube feed was adjust to more protein and lower carbohydrates  2019 - multiple phone calls regarding recurrent seizures (generalized shaking, tonic body rigidity and clonic activity)     Summary 2020  RUF [id number], -250, LEV 1000 q8hr, -400, JESSICA 50-50, CLB 5 qHS  Replacement of her VNS in December 2019, [facility] from [address] [facility] to [facility]  Seizure reporting had been sporadic  Despite medication adjustments, there has been no improvement in degree of wakefulness, nonverbal, calls out at times  She does not do much    Transitioned LEV to Brivaracetam  Increased clobazam to 10mg at bedtime on 5/28/2020  Unclear if there has been improvement in seizure frequency; but there were no admissions to hospital for seizures  Summary 2021  RUF [id number], -250, -400, JESSICA 75-75, CLB 5-10  She was admitted to Hospital on 2/2/2021 for multiple seizures in one day  Tripp Jennifer was increased with an extra 5mg tab in AM  With the increase in Tripp Jennifer, there has been no report of excessive sedation  She continues to have stretches of non-24 hour sleep cycle  She has periods when she appears to be active, smiling and laughter and periods when she has no interactions with those around her  Her seizures are very difficult to notice, unless someone is with her  We had tried to reduce her Brivaracetam dose as there was no clear improvement at higher dose and she has been losing weight  We started Vimpat at the end of the year  Interval History 3/3/2022  RUF [id number], -250, , BRV 50-50, CLB 5-10  Kizzy Gusman continues to decline in function; she is constantly sedated or sleepy, she may be awake for 30 minutes a day  She is sometimes on a play mat, she mostly lays there, sometimes she plays with her toys  One time she was able to say her name and clap her hands and laugh at something  She has moments of increased alertness and awareness  I spoke to Olinda the supervisor nurse for today  There are random episodes of seizure activity, which consists of body head rigidity, eyes opening up, sometimes mild twitching of the arms  She has been having more bruxism, when she is awake and asleep  It has been no generalized tonic clonic seizure for a while  The last time a rescue medication was needed was back in September 2021  Interval History 7/21/2022  I called [facility] and spoke with Rox, the unit nurse  She has not seen any recent seizures  There have been no report regarding seizures   She is not any different with the reduction in the dose of clobazam  She sleeps throughout the day but there are times she is more active  She has poor core strength  She may scream or yell throughout the day  Special Features  Status epilepticus: yes  Self Injury Seizures: No  Precipitating Factors: menstrual cycle? ? Epilepsy Risk Factors:  Abnormal pregnancy: unknown  Abnormal birth/: unknown  Abnormal Development: unknown developmental history  Febrile seizures, simple: unknown  Febrile seizures, complex: unknown  CNS infection: No  Mental retardation: Yes  Cerebral palsy: Yes  Head injury (moderate/severe): possibly anoxic brain injury  CNS neoplasm: No  CNS malformation: No  Neurosurgical procedure: No  Stroke: No  Alcohol abuse: No  Drug abuse: No  Family history Sz/epilepsy: unknown     Prior AEDs:  Rufinamide  Clobazam (excessive sedation)  Clonazepam  Levetiracetam (unclear if beneficial)  Lamotrigine (unclear if beneficial)  Topiramate (missed doses caused breakthrough convulsive type of seizures)  Valproate (elevated ammonia levels)  Fycompa (excess sedation)  Felbamate (listed as a drug allergy)  Phenobarbital (contributing to sedation)  Epidiolex (seems to help the most)  Brivaracetam  lacosamide     Prior workup:  x   Imaging:  CT head 2013, 2018  No acute intracranial pathology     3/20/2019  MRI brain w/wo contrast  Normal MR brain study  Symmetric hippocampal formations     EEGs:  Continuous video EEG monitoring 10/13 - 10/15/2017  Frequent generalized spike/polyspike-slow wave complexes during sleep  At times there are independent right more than left midtemporal spikes and bitemporal spikes     Innumerable generalized tonic seizures during sleep, generalized 1 Hz period discharges, that would become continuous for 30 seconds or generalized rhythmic alpha-beta discharges, associated with patient becoming rigid, tonic posturing with arms elevated and tense with subtle jerking of the upper body, eyes opening with upward deviation    Ictal patterns:  1 - Seconds of diffuse electrodecrement then paroxysmal fast activity followed by 2Hz spike wave discharges (clinically accompanied by posturing of the arms, then clonic jerking)     Continuous video EEG monitoring 10/18 - 10/25/2017  Diffuse background slowing with bursts of arrhythmic generalized spike wave discharges, with shifting lateralization, and independent left and right temporal spikes  Mild eyelid myoclonia associated with brief bursts of 2-2 5 Hz generalized discharges  Tonic seizures with eelctrodecrement  There were innumerable tonic seizures; however by 10/25/2017 the frequency of these seizures did decrease in frequency  Continuous video EEG monitoring 12/1-12/5/2017  There are innumerable tonic seizures that are generally less than one minute in duration (30-40 seconds), these consists of subtle eye opening and tonic stiffening of arms, if longer then whole body stiffening with clonic jerking movements  These almost always occur exclusively out of sleep  These consist of 2-2 5 Hz generalized spike-slow wave complexes with generalized attenuation of activity (desynchronization) or paroxysmal fast activity  Per 24 hours count of seizures could be       12/19/2018 routine study  Frequent recurrent tonic seizures associated with paroxysmal generalized fast activity then generalized rhythmic discharges associated with eyes upward deviation, and myoclonic/clonic jerking of the upper body, excess beta activity  6/28-7/3/2019 continuous video EEG monitoring  Frequent clusters of tonic seizures (body rigidity, head flexions, eyes go up with dysconjugate gaze, and clonic activity of the arms for a few seconds), these are associated with GPFA and rhythmic spike-slow waves  There are also bilateral temporal focal epileptiform discharges      Past Medical History:   Diagnosis Date   • ADHD    • [facility] )    • Autistic disorder    • Breakthrough seizure ([facility] ) 8/1/2019   • Dysphagia    • Dysphagia, oropharyngeal phase    • Gastrostomy tube dependent (HCC)     14 Fr as of 05/19/2020   • Hyperammonemia (HCC)    • Hyperkeratosis    • Hypotension    • Intellectual disability    • Lennox-Gastaut syndrome with tonic seizures (HCC)    • Lethargy    • Liver enzyme elevation    • Onychomycosis    • Osteoporosis    • Osteoporosis        Past Surgical History:   Procedure Laterality Date   • ABDOMINAL SURGERY     • CARDIAC PACEMAKER PLACEMENT      vns implant l chest   • IR GASTROSTOMY TUBE PLACEMENT  11/21/2019   • IR THORACENTESIS  12/17/2018   • JEJUNOSTOMY FEEDING TUBE      history of - most recently, PEG tube   • PEG TUBE PLACEMENT     • SC IMP STIM,CRANIAL,SUBQ,1 ARRAY Left 12/18/2019    Procedure: REPLACEMENT IMPLANTABLE PULSE GENERATOR FOR VAGAL NERVE STIMULATOR, LEFT CHEST;  Surgeon: Beulah Collins MD;  Location: [facility];  Service: Neurosurgery       Current Outpatient Medications   Medication Sig Dispense Refill   • acetaminophen (TYLENOL) 325 mg tablet Take 650 mg by mouth every 6 (six) hours as needed for mild pain or fever     • Brivaracetam (Briviact) 10 MG/ML SOLN oral solution 5 mL (50 mg total) by Per PEG Tube route every 12 (twelve) hours 300 mL 5   • cannabidiol (Epidiolex) 100 mg/ml SOLN 4 mL (400 mg total) by Per G Tube route 2 (two) times a day 240 mL 5   • cloBAZam (ONFI) oral suspension Give 1mL per G-tube once daily at bedtime 30 mL 5   • diazepam (VALIUM) 5 MG/ML solution If the patient has a seizure lasting more than 3 minutes then give 1mL by PEG tube, may repeat 1mL if she continues to have seizure for more than 10 minutes   30 mL 1   • lacosamide (Vimpat) 10 mg/mL 15 mL (150 mg total) by Per G Tube route every 12 (twelve) hours 900 mL 5   • magnesium hydroxide (MILK OF MAGNESIA) 400 mg/5 mL oral suspension Take 30 mL by mouth daily as needed for constipation If not BM by day 3     • melatonin 3 mg 6 mg by Per G Tube route daily at bedtime     • Multiple Vitamins-Minerals (MULTIVITAMIN WITH IRON-MINERALS) liquid 5 mL by Per G Tube route daily     • Probiotic Product (ALEXANDER-BID PROBIOTIC PO) 1 tablet by Per G Tube route daily       • rufinamide (BANZEL) 400 mg tablet 4 tablets (1,600 mg total) by Per G Tube route every 12 (twelve) hours 240 tablet 5   • topiramate (TOPAMAX) 50 MG tablet 5 tablets (250 mg total) by Per G Tube route every 12 (twelve) hours 300 tablet 5   • VITAMIN D PO Take 1,000 mg by mouth in the morning Given in her G-tube     • bisacodyl (DULCOLAX) 10 mg suppository Insert 10 mg into the rectum daily at bedtime as needed for constipation (if no BM day #4) (Patient not taking: Reported on 10/24/2022)     • bisacodyl (FLEET) 10 MG/30ML ENEM Insert 10 mg into the rectum as needed for constipation Give at hs on day 5 if suppository is not effective (Patient not taking: Reported on 10/24/2022)       No current facility-administered medications for this visit  Allergies   Allergen Reactions   • Felbamate      Social History  Living situation:  Resident of [facility]   reports that she has never smoked  She has never used smokeless tobacco  She reports that she does not drink alcohol and does not use drugs  Review of Systems   Constitutional: Negative for chills and fever  HENT: Negative for ear pain and sore throat  Eyes: Negative for pain and visual disturbance  Respiratory: Negative for cough and shortness of breath  Cardiovascular: Negative for chest pain and palpitations  Gastrointestinal: Negative for abdominal pain and vomiting  Genitourinary: Negative for dysuria and hematuria  Musculoskeletal: Negative for arthralgias and back pain  Skin: Negative for color change and rash  Neurological: Positive for seizures (10/20/22 )  Negative for syncope  All other systems reviewed and are negative        Video Exam    Vitals:    10/24/22 0801   Weight: 42 7 kg (94 lb 1 6 oz)   Height: 5' (1 524 m)       Physical Exam  Constitutional: Comments: Patient was sitting in her wheelchair, awake and alert, playing with a stuffed toy (rubbing it on her mouth and face)  No acute distress   Neurological:      Comments: Mental status: non-verbal, heard making screaming noises occasionally  Very alert today    CN 1: not tested  CN 2: not tested  CN 3, 4, 6: unable to formally test  CN 5: not tested  CN 7: muscles of facial expression appeared symmetric  CN 8: not tested  CN 9: not tested  CN 10: not tested  CN 11: not tested  CN 12: not tested    Motor:  Strength not formally tested  I visualized her moving her arms spontaneously    Could not see LE due to positioning of the camera  Does not ambulate, in a WC          I spent 15 minutes directly with the patient during this visit

## 2022-10-24 NOTE — PATIENT INSTRUCTIONS
Per nursing staff, Tuan Andrade is doing well, no clustering of seizures, last seizure seen was on 10/20 lasting about 30 seconds  Prior to that, no seizures seen since July  Will continue to try and wean clobazam   She is currently on 5mg at bedtime (taking 1/2 of a 10mg tab)  They do not make tabs in lesser doses (10mg is smallest)  Asked nursing if they felt they could give a 1/4 tab (2 5mg) but felt that would not be feasible  There is a liquid formulation of 2 5mg/mL, so will change from tabs to liquid and given 1mL (2 5mg) at bedtime  Also offered to d/c clobazam completely, but nursing felt more comfortable with a smaller reduction this time and monitor  Plan:   Medications are given by PEG  1 - Continue Epidiolex 100mg/mL 4 0 mL (400mg) every 12 hours (Goes to [facility] specialty pharmacy)  2 - Continue Briviact 10mg/mL 5mL (50mg) every 12 hours  3 - continue with Topiramate 250mg every 12 hours  4 - continue Banzel 400mg give 4 tabs every 12 hours  5 - Change clobazam-- will discontinue tablets (currently taking 1/2 of a 10mg tab HS), and start liquid clobazam 2 5mg/mL give 1mL at bedtime  6 - continue with Vimpat 10mg/mL give 15mL every 12 hours    7 - give diazepam 5mg/mL 1 mL as needed for seizure last more than 3 minutes and repeat second dose for seizure lasting more than 10 minutes  8 - Call the office if there is increase in seizure frequency  9 - follow-up in 3-4 months with Dr Marely Sharma

## 2022-11-15 ENCOUNTER — TELEPHONE (OUTPATIENT)
Dept: NEUROLOGY | Facility: CLINIC | Age: 42
End: 2022-11-15

## 2022-11-15 DIAGNOSIS — G40.814 INTRACTABLE LENNOX-GASTAUT SYNDROME WITHOUT STATUS EPILEPTICUS (HCC): ICD-10-CM

## 2022-11-15 NOTE — TELEPHONE ENCOUNTER
*Please see msg below for further details*  [facility] (name changed recently) [phone]  Spoke w/Rox  They do not complete seizure reporting forms  They make a progress note in patient's chart  Confirmed the following medications:  *Clobazam - 1 ml daily @ HS  *dose reduced at 10/24/22 LOV    Epidiolex 100mg - 4 ml BID  topamax 50mg - 5 tabs BID  rufinamide 400mg - 4 tabs BID  lacosamide 10mg - 15ml BID  Briviact 10mg/ml - 5 mls BID    Rox confirmed all symptoms have resolved  Patient is back to baseline  She confirms this is the first seizure since reducing Onfi dose      Blair - fyi

## 2022-11-15 NOTE — TELEPHONE ENCOUNTER
Called the [employer]  Spoke w/Arianne-charge RN  She states patient's arms were shaking/moving back and forth  Patient could not track staff (patient is non-verbal); she tracks at baseline  Patient was drooling  Patient did evacuate bowel/bladder  Patient did not seem to be in a postictal state following seizure  She was laughing and making noises s/p seizure  Charge RN states patient was wearing a sweatshirt and was sweating  Staff wondered if patient being hot triggered elevation in temp as she had normal temp s/p seizure  Patient was not taken to ER  Ativan was administered at 3 minutes; VNS was activated at 3 mins  Charge RN denies sleep deprivation  Charge RN denies any new stressors  Charge RN denies any recent illnesses  Charge RN denies any ETOH or drug use  Charge RN denies any new medications, including OTC  Confirms no missed doses of medications  Confirms patient is currently at baseline

## 2022-11-15 NOTE — TELEPHONE ENCOUNTER
This seems like a much longer seizure than she has been having  Was this a generalized tonic clonic seizure? Can they describe it? Did they ensure no infection that could have provoked seizure? Sonya with temp of 102

## 2022-11-15 NOTE — TELEPHONE ENCOUNTER
Received -Hi, this is rasheeda calling from [facility]  I just wanted to let you know for your records that she had had a seizure today lasting about 6 minutes  it started at 12:14 and about 12:17  She received a dose of her diazepam  She had a temp of 102 2, , all other vitals were ok  She was rechecked at 12:50, temp was 98 8  HR was 91  Doing well now, but I just want to let you know so that you have that records  If you need anything from me, you can call me back at [phone]

## 2022-11-16 RX ORDER — CLOBAZAM 2.5 MG/ML
SUSPENSION ORAL
Qty: 60 ML | Refills: 5 | Status: SHIPPED | OUTPATIENT
Start: 2022-11-16

## 2022-11-16 NOTE — TELEPHONE ENCOUNTER
Desire Miranda from [employer] called asking to schedule a sooner follow up appointment for the patient  Advised we are waiting to find out what the provider advises doing for the patient and once we get a response we will give them a call back

## 2022-11-16 NOTE — TELEPHONE ENCOUNTER
We can go back to the higher dose of clobazam   At last visit we decreased from 5mg to 2 5mg  She previously had difficulty weaning this med before  Since she is currently on liquid (was on tabs before), will continue the liquid  Increase clobazam 2 5/mL to 2mL at bedtime  Where does new script need to go?   Thanks

## 2022-11-16 NOTE — TELEPHONE ENCOUNTER
Spoke w/Arianne  She advised they no longer use [facility]  She transferred me to [facility]  Per [facility], new pharmacy is:    [facility] in [place]  Z) [phone]  (H) [phone]  Spoke w/Kamila  She confirmed they do participate w/e-scribe

## 2022-11-26 ENCOUNTER — APPOINTMENT (EMERGENCY)
Dept: RADIOLOGY | Facility: HOSPITAL | Age: 42
End: 2022-11-26

## 2022-11-26 ENCOUNTER — HOSPITAL ENCOUNTER (EMERGENCY)
Facility: HOSPITAL | Age: 42
Discharge: HOME | End: 2022-11-26
Attending: EMERGENCY MEDICINE

## 2022-11-26 VITALS
HEIGHT: 60 IN | HEART RATE: 75 BPM | WEIGHT: 94 LBS | OXYGEN SATURATION: 97 % | BODY MASS INDEX: 18.46 KG/M2 | TEMPERATURE: 96.9 F | DIASTOLIC BLOOD PRESSURE: 72 MMHG | SYSTOLIC BLOOD PRESSURE: 115 MMHG | RESPIRATION RATE: 18 BRPM

## 2022-11-26 DIAGNOSIS — T85.528A DISLODGED GASTROSTOMY TUBE: Primary | ICD-10-CM

## 2022-11-26 RX ADMIN — DIATRIZOATE MEGLUMINE AND DIATRIZOATE SODIUM 20 ML: 660; 100 LIQUID ORAL; RECTAL at 15:46

## 2022-11-26 NOTE — ED NOTES
Round trip order placed for transport back to home       Kal[facility]  11/26/22 1544

## 2022-11-26 NOTE — ED NOTES
G-tube replaced at this time by [employer] [facility]   Xray at bedside     Alejandro Amor, [facility]  11/26/22 6442

## 2022-11-26 NOTE — ED PROVIDER NOTES
History  Chief Complaint   Patient presents with   • Feeding Tube Problem     Sent from nursing home; ripped out gtube     60-year-old female with history of anoxic brain damage presents from nursing home after g-tube was dislodged  Per report, patient dislodged G-tube prior to nursing facility calling EMS  Per chart review patient had a [address] to replaced several months ago, this had to be downsized from [facility] [place]  Patient is in no acute distress presently, playing with a toy  She cannot contribute to history  Prior to Admission Medications   Prescriptions Last Dose Informant Patient Reported? Taking?    Brivaracetam (Briviact) 10 MG/ML SOLN oral solution   No No   Si mL (50 mg total) by Per PEG Tube route every 12 (twelve) hours   Multiple Vitamins-Minerals (MULTIVITAMIN WITH IRON-MINERALS) liquid   Yes No   Si mL by Per G Tube route daily   Probiotic Product (ALEXANDER-BID PROBIOTIC PO)   Yes No   Si tablet by Per G Tube route daily     VITAMIN D PO   Yes No   Sig: Take 1,000 mg by mouth in the morning Given in her G-tube   acetaminophen (TYLENOL) 325 mg tablet   Yes No   Sig: Take 650 mg by mouth every 6 (six) hours as needed for mild pain or fever   bisacodyl (DULCOLAX) 10 mg suppository   Yes No   Sig: Insert 10 mg into the rectum daily at bedtime as needed for constipation (if no BM day #4)   Patient not taking: Reported on 10/24/2022   bisacodyl (FLEET) 10 MG/30ML ENEM   Yes No   Sig: Insert 10 mg into the rectum as needed for constipation Give at hs on day 5 if suppository is not effective   Patient not taking: Reported on 10/24/2022   cannabidiol (Epidiolex) 100 mg/ml SOLN   No No   Si mL (400 mg total) by Per G Tube route 2 (two) times a day   cloBAZam (ONFI) oral suspension   No No   Sig: Give 2mL per G-tube once daily at bedtime   diazepam (VALIUM) 5 MG/ML solution   No No   Sig: If the patient has a seizure lasting more than 3 minutes then give 1mL by PEG tube, may repeat 1mL if she continues to have seizure for more than 10 minutes  lacosamide (Vimpat) 10 mg/mL   No No   Sig: 15 mL (150 mg total) by Per G Tube route every 12 (twelve) hours   magnesium hydroxide (MILK OF MAGNESIA) 400 mg/5 mL oral suspension   Yes No   Sig: Take 30 mL by mouth daily as needed for constipation If not BM by day 3   melatonin 3 mg   Yes No   Si mg by Per G Tube route daily at bedtime   rufinamide (BANZEL) 400 mg tablet   No No   Si tablets (1,600 mg total) by Per G Tube route every 12 (twelve) hours   topiramate (TOPAMAX) 50 MG tablet   No No   Si tablets (250 mg total) by Per G Tube route every 12 (twelve) hours      Facility-Administered Medications: None       Past Medical History:   Diagnosis Date   • ADHD    • Anoxic brain damage (HCC)    • Autistic disorder    • Breakthrough seizure ([facility] [address] ) 2019   • Dysphagia    • Dysphagia, oropharyngeal phase    • Gastrostomy tube dependent (HCC)     14 Fr as of 2020   • Hyperammonemia (HCC)    • Hyperkeratosis    • Hypotension    • Intellectual disability    • Lennox-Gastaut syndrome with tonic seizures (HCC)    • Lethargy    • Liver enzyme elevation    • Onychomycosis    • Osteoporosis    • Osteoporosis        Past Surgical History:   Procedure Laterality Date   • ABDOMINAL SURGERY     • CARDIAC PACEMAKER PLACEMENT      vns implant l chest   • IR GASTROSTOMY TUBE PLACEMENT  2019   • IR THORACENTESIS  2018   • JEJUNOSTOMY FEEDING TUBE      history of - most recently, PEG tube   • PEG TUBE PLACEMENT     • NV IMP STIM,CRANIAL,SUBQ,1 ARRAY Left 2019    Procedure: REPLACEMENT IMPLANTABLE PULSE GENERATOR FOR VAGAL NERVE STIMULATOR, LEFT CHEST;  Surgeon: Thelma Concepcion MD;  Location: [facility];  Service: Neurosurgery       History reviewed  No pertinent family history  I have reviewed and agree with the history as documented      E-Cigarette/Vaping   • E-Cigarette Use Never User      E-Cigarette/Vaping Substances   • Nicotine No • THC No    • CBD No    • Flavoring No    • Other No    • Unknown No      Social History     Tobacco Use   • Smoking status: Never   • Smokeless tobacco: Never   Vaping Use   • Vaping Use: Never used   Substance Use Topics   • Alcohol use: Never   • Drug use: Never       Review of Systems   Unable to perform ROS: Patient nonverbal       Physical Exam  Physical Exam  Vitals reviewed  Constitutional:       General: She is not in acute distress  Appearance: She is not ill-appearing, toxic-appearing or diaphoretic  Comments: Thin   HENT:      Head: Normocephalic and atraumatic  Right Ear: External ear normal       Left Ear: External ear normal       Nose: Nose normal       Mouth/Throat:      Mouth: Mucous membranes are dry  Eyes:      General:         Right eye: No discharge  Left eye: No discharge  Extraocular Movements: Extraocular movements intact  Cardiovascular:      Rate and Rhythm: Normal rate and regular rhythm  Pulmonary:      Effort: Pulmonary effort is normal  No respiratory distress  Abdominal:      General: There is no distension  Palpations: Abdomen is soft  Tenderness: There is no abdominal tenderness  There is no guarding or rebound  Comments: Tract in left mid abdomen without g-tube, skin around insertion site appears well   Musculoskeletal:         General: No deformity or signs of injury  Right lower leg: No edema  Left lower leg: No edema  Skin:     General: Skin is warm  Coloration: Skin is not jaundiced or pale  Neurological:      General: No focal deficit present  Mental Status: She is alert  Mental status is at baseline           Vital Signs  ED Triage Vitals [11/26/22 1511]   Temperature Pulse Respirations Blood Pressure SpO2   (!) 96 9 °F (36 1 °C) 72 18 116/79 98 %      Temp Source Heart Rate Source Patient Position - Orthostatic VS BP Location FiO2 (%)   Temporal Monitor Sitting Right arm --      Pain Score       -- Vitals:    11/26/22 1511 11/26/22 1600   BP: 116/79 120/68   Pulse: 72 78   Patient Position - Orthostatic VS: Sitting Sitting         Visual Acuity      ED Medications  Medications   diatrizoate meglumine-sodium (GASTROGRAFIN) solution 20 mL (20 mL Oral Given 11/26/22 1546)       Diagnostic Studies  Results Reviewed     None                 XR abdomen 1 view kub    (Results Pending)              Procedures  Feeding Tube    Date/Time: 11/26/2022 3:45 PM  Performed by: Jean-Pierre Redding DO  Authorized by: Jean-Pierre Redding DO   Universal Protocol:  Required items: required blood products, implants, devices, and special equipment available  Patient identity confirmed: verbally with patient      Patient location:  ED  Pre-procedure details: Old tube type:  Gastrostomy    Old tube size:  16 Fr  Indications:     Indications: tube dislodged and tube removed by patient    Anesthesia (see MAR for exact dosages): Anesthesia method:  None  Procedure details:     Patient position:  Supine    Procedure type:  Replacement    Tube type:  Gastrostomy    Tube size:  16 Fr    Bulb inflation volume:  6    Bulb inflation fluid:  Normal saline  Post-procedure details:     Placement/position confirmation:  Contrast and x-ray    Placement difficulty:  None    Bleeding:  Minimal    Patient tolerance of procedure: Tolerated well, no immediate complications             ED Course  ED Course as of 11/26/22 1605   Sat Nov 26, 2022   1528 On 6/9, 16Fr was palced   1604 XR abdomen 1 view kub  Contrast appears in stomach                               SBIRT 20yo+    Flowsheet Row Most Recent Value   SBIRT (25 yo +)    In order to provide better care to our patients, we are screening all of our patients for alcohol and drug use  Would it be okay to ask you these screening questions?  Unable to answer at this time Filed at: 11/26/2022 1513                    MDM  Number of Diagnoses or Management Options  Dislodged gastrostomy tube  Diagnosis management comments: 71-year-old female presents after G-tube was dislodged  G-tube successfully replace with 16 [address], placement confirmed with KUB and contrast placed through tubing  Patient tolerated the procedure well, will discharge back to nursing facility  Disposition  Final diagnoses:   Dislodged gastrostomy tube     Time reflects when diagnosis was documented in both MDM as applicable and the Disposition within this note     Time User Action Codes Description Comment    11/26/2022  3:46 PM Georgia Peralta Add [K21 075K] Dislodged gastrostomy tube       ED Disposition     ED Disposition   Discharge    Condition   Stable    Date/Time   Sat Nov 26, 2022  3:46 PM    Comment   Zack Cardoza [facility] discharge to home/self care  Follow-up Information     Follow up With Specialties Details Why [address] [facility],  Family Medicine   [facility] [phone]            Patient's Medications   Discharge Prescriptions    No medications on file       No discharge procedures on file      PDMP Review       Value Time User    PDMP Reviewed  Yes 10/24/2022  8:42 AM Kristie Mars PA-C          ED Provider  Electronically Signed by           Katerina Parks DO  11/26/22 8442

## 2022-11-26 NOTE — ED NOTES
Ems here for pt at this time     Janey Christensen, [facility]  11/26/22 5494

## 2022-11-26 NOTE — ED NOTES
Patient playing with toy brought from facility, neuro at baseline per EMS        Juan Francisco Lizama, [facility]  11/26/22 8507

## 2022-11-28 NOTE — TELEPHONE ENCOUNTER
Spoke to Arsalan  Will get labs drawn  Nothing else outstanding at this time for patient so labs will be drawn as directed  dizziness

## 2023-01-30 ENCOUNTER — TELEPHONE (OUTPATIENT)
Dept: NEUROLOGY | Facility: CLINIC | Age: 43
End: 2023-01-30

## 2023-01-30 NOTE — ASSESSMENT & PLAN NOTE
· Patient presented with AMS and somnolence from nursing facility  She was admitted to [facility] 8/13 to 8/17  There was question of underlying pneumonia although PCT level was low and antibiotics were d/c  She was noted to have hyperammonemia although this is chronic  Found to have elevated LFTs and Depakote, phenobarbital doses were decreased  Fycompa was not on formulary and suspect patient was having subtle breakthrough seizures likely contributing to her somnolence    · Mental status at baseline waxes and wanes with intermittent periods of hyper and hypoactivity  · Continue to monitor mental status - fluctuates throughout the day   · Continue one-to-one continuous observation  · Medication regimen per Neurology fall precautions

## 2023-01-30 NOTE — TELEPHONE ENCOUNTER
I called and left a voicemail message reminding the patient of there upcoming appointment with Dr España on 02/14/23 @ 11:30 am  I requested a call back to our office to confirm the appointment or if the patient has any issues or concerns or cannot keep this appointment   I sent out an appt card with the doctors name , date, time and location of appt

## 2023-02-02 ENCOUNTER — TELEPHONE (OUTPATIENT)
Dept: NEUROLOGY | Facility: CLINIC | Age: 43
End: 2023-02-02

## 2023-02-02 DIAGNOSIS — G40.814 INTRACTABLE LENNOX-GASTAUT SYNDROME WITHOUT STATUS EPILEPTICUS (HCC): Primary | ICD-10-CM

## 2023-02-02 NOTE — TELEPHONE ENCOUNTER
I would like to get updated AED levels on her prior to the appt with Dr Lou Cedeno  I do not see any lacosamide levels on her since initiating therapy over a year ago, and increasing the dose  Would like to see where her level is at so we could potentially increase that med  Dr Sheree Alexander goal initially was to start lacosamide and get her off clobazam, and we had been reducing the clobazam, although it was increased back a few months ago when she had a seizure  I entered labs  Please see if they can get them done ASAP so we have levels on her to make med adjustments

## 2023-02-02 NOTE — TELEPHONE ENCOUNTER
[facility] [employer]  Spoke w/supervisor Trena Bonner  Advised her of note below  Trena Bonner requested lab orders be faxed to:    [phone] (f) Yousuf    Lab orders faxed to number above

## 2023-02-02 NOTE — TELEPHONE ENCOUNTER
Received vm from [employer] [phone]  Patient had 4 seizures this mornin:25 10 second seizure  7:32 8 second seizure  7:44 10 second seizure  8:06 5 seconds    7:32am pt received diazepam 1ml x 1 dose  Stable and comfortable at this time  __________________________________    Rena Elizondo w/Daryn (Rox) at [place]  She reports pt's had whole body shaking (more upper body); her eyes were fixed; she was drooling  No tongue bite  After seizures pt was thrashing around and screaming at staff  Rox states this was different than normal seizures as pt is usually calm and fatigued s/p seizure  Denies sleep deprivation  No recent illnesses or infection  No ETOH or drug use  Pt was not taken to ER  Confirmed medications: Onfi - 2ml daily @ HS  Epidiolex 100mg/ml - 4 ml BID  Vimpat 10mg/ml - 15ml BID  Briviact 10mg/ml - 5 ml BID  Topamax 50mg - 250mg BID  Rufinimede 400mg - 1600mg BID  Probiotic  Vitamin D 1000mg daily  *No missed doses of any meds    LOV - 10/2022  F/U - 23 w/Dr Jarvis Dumont - please advise

## 2023-02-06 NOTE — TELEPHONE ENCOUNTER
[facility] requesting Levo 125 mcg daily refill.    LOV 03-07-22. NOV 03-09-23.   Next TSH lab test 03-07-23.    90 day/no refill rx to requesting pharmacy.   [address] [phone]  Spoke w/Arianne  Advised her of note below  Marylee Cheek verbalized understanding  Please fax script to  - please enter script  Thanks! Patient is scheduled for f/u appt in 2/2023  Any need to schedule patient sooner (see note below)?

## 2023-02-07 NOTE — TELEPHONE ENCOUNTER
Called [place] [facility] back (20 minutes since previous call)- Rox is still on break  Will be at facility until 3pm  Will call back later this afternoon

## 2023-02-07 NOTE — TELEPHONE ENCOUNTER
Called [phone] [address] is on lunch for next 10 minutes  Will need to call back  Need to triage and confirm if AED levels were completed

## 2023-02-07 NOTE — TELEPHONE ENCOUNTER
Received vm from Rox at [place]  She states patient had a 15 second seizure this AM  Patient was very tense with upper body shaking (small repetitive motion)  Patient was very fatigued afterwards  Cb#: [phone]    Called to get additional information and to see if other lab orders have been completed as I only see CBC, CMP resulted  Was transferred to nursing, went to  but unable to leave message

## 2023-02-07 NOTE — TELEPHONE ENCOUNTER
Attempted to reach [address], transferred to [facility]  Reached voicemail that has not been set up yet  Cannot leave message

## 2023-02-08 RX ORDER — CLOBAZAM 2.5 MG/ML
SUSPENSION ORAL
Qty: 120 ML | Refills: 5 | Status: SHIPPED | OUTPATIENT
Start: 2023-02-08

## 2023-02-08 NOTE — TELEPHONE ENCOUNTER
[facility]  Spoke w/Rosalina-Nursing Supervisor  Advised her of note below  Rosalina verbalized understanding  Script for clobazam will need to be faxed to pharmacy as it is a controlled substance  (f) [phone]    Adalgisa Javed advised the clobazam and lacosamide levels were drawn but results are still pending  They will send to our office as soon as they are received       Rahat purcell Please send Levothyroxine script to pharmacy.  Pt also asked about referral to electrophysiologist

## 2023-02-08 NOTE — TELEPHONE ENCOUNTER
[address] [place] [phone]  Transferred to prollie phone  Received vm  Unable to leave vm  Called back to [address]  Asked to speak w/supervisor  Call transferred to [place]  Rox was w/supervisor and came on the phone  Rox confirmed topiramate level was drawn  She will fax results to [phone] (emergency fax)  Clobazam and   Lacosamide levels were not drawn  Rox does not know why  She will contact the lab this morning  I requested they be drawn urgently  Rox RN then reports patient had another seizure this AM (appx 9:15)  Seizure lasted 10 seconds  Upper body shaking  Repetitive motions  Pt was non-responsive  No drooling  Very fatigued s/p seizure  Pt did not fall  No recent illnesses  No ETOH or drug use  Today's seizure and yesterday's seizure were the same  This is typical seizure for pt  Confirmed medications: Onfi - 2ml daily @ HS   Epidiolex 100mg/ml - 4 ml BID   Vimpat 10mg/ml - 15ml BID   Briviact 10mg/ml - 5 ml BID   Topamax 50mg - 250mg BID   Rufinimede 400mg - 1600mg BID   Probiotic   Vitamin D 1000mg daily   *No missed doses of any meds    Li - only topiramate level drawn  Awaiting results  Requested clobazam and lacosamide labs be drawn urgently  Please advise

## 2023-02-08 NOTE — TELEPHONE ENCOUNTER
Fax received; however, pages are blank  [address] [place] [phone]  Call transferred to [facility]  Unable to leave msg as mailbox is full  Called again  Asked to speak david/Luz Maria-supervisor  Received vm  Mailbox is not setup  Unable to leave vm  Called [facility] again (#3)   transferred call to Jaclyn-Nursing supervisor  She will have topiramate results re-faxed to [phone]

## 2023-02-08 NOTE — TELEPHONE ENCOUNTER
Odilia--I made a mistake in my earlier documentation  Patient is taking clobazam 2 5mg/mL taking 2mL ONCE A DAY, not BID  I thought she was taking it BID  Therefore, will just increase the dose to 4mL once a day at bedtime  This is not changing to BID dosing  Will just increase the once daily dosing  I am sending [facility] script to [facility], as requested, but please call her facility back and tell them the new instructions      Thanks!!

## 2023-02-08 NOTE — TELEPHONE ENCOUNTER
[employer]  Spoke w/Rosalina  Advised her of note below  Rosalina verbalized understanding  Confirmed new script for clobazam reflects increased once a day dosing (4ml via g-tube qPM)  Pt is stable   no cp no dyspnea    MEDICATIONS  (STANDING):  atorvastatin 20milliGRAM(s) Oral at bedtime  clopidogrel Tablet 75milliGRAM(s) Oral daily  amLODIPine   Tablet 10milliGRAM(s) Oral daily  aspirin enteric coated 81milliGRAM(s) Oral daily  insulin lispro (HumaLOG) corrective regimen sliding scale  SubCutaneous Before meals and at bedtime  dextrose 5%. 1000milliLiter(s) IV Continuous <Continuous>  dextrose 50% Injectable 12.5Gram(s) IV Push once  dextrose 50% Injectable 25Gram(s) IV Push once  dextrose 50% Injectable 25Gram(s) IV Push once  povidone iodine 10% Solution 1Application(s) Topical daily  nitroglycerin    Patch 0.1 mG/Hr(s) 1patch Transdermal daily  insulin glargine Injectable (LANTUS) 16Unit(s) SubCutaneous every morning  heparin  Injectable 5000Unit(s) SubCutaneous every 8 hours  BACItracin   Ointment 1Application(s) Topical daily  piperacillin/tazobactam IVPB. 4.5Gram(s) IV Intermittent every 6 hours  furosemide    Tablet 20milliGRAM(s) Oral daily  acetaminophen   Tablet. 975milliGRAM(s) Oral every 8 hours  metoprolol succinate ER 50milliGRAM(s) Oral daily  BACItracin   Ointment 1Application(s) Topical two times a day  lisinopril 5milliGRAM(s) Oral daily    MEDICATIONS  (PRN):  dextrose Gel 1Dose(s) Oral once PRN Blood Glucose LESS THAN 70 milliGRAM(s)/deciliter  glucagon  Injectable 1milliGRAM(s) IntraMuscular once PRN Glucose LESS THAN 70 milligrams/deciliter  oxyCODONE  5 mG/acetaminophen 325 mG 1Tablet(s) Oral every 4 hours PRN Moderate Pain (4 - 6)    PAST MEDICAL & SURGICAL HISTORY:  Asthma  DM (diabetes mellitus)  HTN (hypertension)  CHF (congestive heart failure): systolic EF 20  CAD (coronary artery disease): s/p stent      Lungs decreased breath sounds at bases    will repeat CXR    CV S 1 s2   Abd soft  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

## 2023-02-08 NOTE — TELEPHONE ENCOUNTER
While waiting on the levels, let's go back up on her Onfi (clobazam)    She is currently on 2 5mg/mL taking 2mL BID (5mg BID)    Will increase clobazam to 5mg AM (this dose does not change, still 2mL AM) and 10mg PM (increase to 4mL PM)    Please see how they need the script sent--pharmacy, or faxed to them? Still would like levels prior to her appt with Dr Venessa Pena next week    I did discuss this with Dr Venessa Pena, so he is aware

## 2023-02-14 ENCOUNTER — OFFICE VISIT (OUTPATIENT)
Dept: NEUROLOGY | Facility: CLINIC | Age: 43
End: 2023-02-14

## 2023-02-14 VITALS
SYSTOLIC BLOOD PRESSURE: 95 MMHG | TEMPERATURE: 97 F | WEIGHT: 84.6 LBS | HEIGHT: 60 IN | HEART RATE: 75 BPM | DIASTOLIC BLOOD PRESSURE: 63 MMHG | BODY MASS INDEX: 16.61 KG/M2

## 2023-02-14 DIAGNOSIS — G40.814 INTRACTABLE LENNOX-GASTAUT SYNDROME WITHOUT STATUS EPILEPTICUS (HCC): ICD-10-CM

## 2023-02-14 DIAGNOSIS — G40.812 LENNOX-GASTAUT SYNDROME WITH TONIC SEIZURES (HCC): ICD-10-CM

## 2023-02-14 RX ORDER — BRIVARACETAM 10 MG/ML
50 SOLUTION ORAL EVERY 12 HOURS
Qty: 300 ML | Refills: 5 | Status: SHIPPED | OUTPATIENT
Start: 2023-02-14

## 2023-02-14 RX ORDER — LACOSAMIDE 10 MG/ML
200 SOLUTION ORAL EVERY 12 HOURS SCHEDULED
Qty: 1200 ML | Refills: 5 | Status: SHIPPED | OUTPATIENT
Start: 2023-02-14

## 2023-02-14 RX ORDER — RUFINAMIDE 400 MG/1
1600 TABLET, FILM COATED ORAL EVERY 12 HOURS
Qty: 240 TABLET | Refills: 5 | Status: SHIPPED | OUTPATIENT
Start: 2023-02-14

## 2023-02-14 RX ORDER — CLOBAZAM 2.5 MG/ML
10 SUSPENSION ORAL
Qty: 120 ML | Refills: 5 | Status: SHIPPED | OUTPATIENT
Start: 2023-02-14

## 2023-02-14 RX ORDER — TOPIRAMATE 50 MG/1
250 TABLET, FILM COATED ORAL EVERY 12 HOURS SCHEDULED
Qty: 300 TABLET | Refills: 5 | Status: SHIPPED | OUTPATIENT
Start: 2023-02-14

## 2023-02-14 RX ORDER — MELATONIN
COMMUNITY
Start: 2022-12-29 | End: 2023-02-14

## 2023-02-14 NOTE — PROGRESS NOTES
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) and progressive physical decline  She continues to have occasional witnessed seizures; these are often her tonic seizures with brief clonic upper body jerking  She has not had a convulsive seizure for some time  I suspect that she likely have more frequent tonic seizures during sleep, based on prior continuous EEG monitoring studies  Her EEG studies show both generalized onset and potential risk for focal onset seizures  She is on maximal doses of Epidiolex, topiramate, rufinamide without improvement in tonic seizure frequency  She has variable degree of wakefulness and non-standard sleep cycle  She had been doing better with clobazam and more recent clusters could have been triggered by weaning of clobazam   It seems that the addition of lacosamide did not sufficiently prevent seizures  Will continue with higher dose of clobazam and increase lacosamide  She will likely need a new VNS generator placed due to higher output current which can deplete the battery sooner  Plan:   Medications are given by PEG  1 - Continue Epidiolex 100mg/mL 4 0 mL (400mg) every 12 hours (Goes to [facility])  2 - Continue Briviact 10mg/mL 5mL (50mg) every 12 hours  3 - continue with Topiramate 250mg every 12 hours  4 - continue Banzel 400mg give 4 tabs every 12 hours  5 - Continue Clobazam at 10mg at bedtime (2 5mg/mL oral solution give 4mL via PEG tube)  6 - Increase Lacosamide 10mg/mL to 20mL every 12 hours    7 - give diazepam 5mg/mL 1 mL as needed for seizure last more than 3 minutes and repeat second dose for seizure lasting more than 10 minutes  8 - Call the office if there is increase in seizure frequency  9 - follow-up in 3 months with advanced practitioner      Problem List Items Addressed This Visit        Nervous and Auditory    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HCC)    Relevant Medications    cannabidiol (Epidiolex) 100 mg/ml SOLN    topiramate (TOPAMAX) 50 MG tablet    rufinamide (BANZEL) 400 mg tablet    lacosamide (Vimpat) 10 mg/mL    cloBAZam (ONFI) oral suspension    Brivaracetam (Briviact) 10 MG/ML SOLN oral solution   Other Visit Diagnoses     Lennox-Gastaut syndrome with tonic seizures (HCC)        Relevant Medications    cannabidiol (Epidiolex) 100 mg/ml SOLN    topiramate (TOPAMAX) 50 MG tablet    rufinamide (BANZEL) 400 mg tablet    lacosamide (Vimpat) 10 mg/mL    cloBAZam (ONFI) oral suspension    Brivaracetam (Briviact) 10 MG/ML SOLN oral solution        Chief Complaint:    Chief Complaint    Follow-up; Seizures       HPI:    Robert Centeno is a 40 y  o female here for follow-up evaluation of intractable epilepsy in the setting of LGS  Interval History 2/14/2023  From her last visit in October 2023, her clobazam dose was decreased to 2 5mg at bedtime  There was a telephone call on 2/2/2023 reporting recurrent 5-10 second seizures in the morning about 4, she received diazepam 1mL dose  Seizures involved whole body shaking, eyes fixed, drooling  After the seizures, she was thrashing and screaming at the staff  For the next few days she continued to have tonic seizures  With the increase in seizures, clobazam was increased to 4mL (10mg) at bedtime  [place]ul [place], when she has a seizure she is tremulous eyes go back and very rigid  There were three seizures in the past 2-3 weeks  She has been alert, she likes music, she play mat  She is awake quite often, she sleeps through the night  Overall the same, she can be wild        AED/side effects/compliance:  Banzel 400mg give 4 tabs twice a day   Topiramate 250mg tab twice a day   Epidiolex 400mg twice a day  Briviact 10mg/mL 50mg twice a day  Lacosamide 150-150  Onfi 4mL at bedtime    Event/Seizure semiology:  Seizure semiology:  1  She was described to have drop attacks or spells with grunting sounds and falling to the floor  2  Her nurse commented on episodes of spasms or myoclonic jerking of the right arm  3  Generalized convulsions  4  Tonic seizures of eyes rolling back (mostly during sleep)    Prior Epilepsy History:  Collective epilepsy history 11/6/2014 was previously evaluated by Dr Emmanuel Duong including a hospitalization in February 2013 for status epilepticus, in the setting of missed doses of AEDs due to refusal to eat  She subsequently required anesthetic induced coma to stop her seizures and PEG tube was placed  She was seen almost every month for management of her seizures up until November 2013  She has medically refractory seizures and had a VNS placed possibly in the early 2000s and a generator changed in 2011  Unknown AEDs that were tried but felbamate is listed as an allergy  In 2011, her AEDs were clonazepam, levetiracetam, Depakote and Lamictal  Dr Emmanuel Duong had started [facility] in 2011  In 2012, Lily Alvarado was added with the reduction of clonazepam  There were difficulty with documenting seizure frequency; but seizures were no longer daily occurrences but there were clusters of seizures  There would be good periods and bad periods of seizure frequency  Dr Emmanuel Duong had been increasing the duty cycle of the VNS over the course of 2012 to 2013  Sometime between August 2013 and October 2013, topiramate was introduced  She was in status epilepticus in 2013, she was on Keppra, Banzel, Onfi, and Lamictal  She had an EEG at some point that showed multifocal spike-wave and background attenuation  She has intermittent agitation and day time somnolence  When she was hospitalized for status epilepticus, she was started on methylphenidate to help wake her up  Intake history November 2014:  VPA level 127    Her Valproic Acid dose was previously 250mg q6hrs based on Dr Phil Valenzuela notes  Since July 2014, she has been receiving VPA 500mg q6hrs  She has not been hospitalized since September 2013  She was previously ambulatory with therapy  She spends most of her time sleeping for the past couple of months  She has also been receiving lactulose for elevated ammonia levels  (older drugs VPA, LVT, LMG, newer drugs added on since 2011 RFN, Onfi, TPM)     Summary of 2015: We decreased VPA from TDD 2000mg to 1000mg with increased agitation/combativeness, alternating days of exhaustion (no behavioral specialist has been involved)  are randomly reported by multiple staff members  Seizures are typically reported by CNA's or her one to one on the 3PM to 11PM shift  Seizures are described a brief events of eyes rolling back and arms going up in the air, followed by hair pulling or slapping herself  These seizures were reported as either sporadic or every other day episodes  There have been no documented grand mal seizures or drop attacks  She refuses to wear safety helmet, rips at her hair, and attempts to flip herself out of her chair and bed  Edel Chaidez can walk briefly but walks on her toes, she frequently will allow herself to fall down when she does not want to walk  It does not appear that methylphenidate has been useful in maintaining her level of wakefulness during the day  VNS generator change on 11/3/2015  The Model 103 (serial [id number]) was replaced with [employer] Model 105, serial [id number]  Weaned off of levetiracetam due to multiple medications and agitation; by the end of 2016, she was down to -500  Summary of 2016:  Started with RFN [id number], -250, CLB 0 5-0 5-25,  QID, -200 attempted to wean off of LEV and reduced the dose of VPA given that there were no reports of seizures and she was sedated, along with elevated ammonia levels  It was unclear as to how effective LEV was for her seizure control    When she missed a dose of TPM in September 2016, she had multiple seizures  We attempted to compensate for increase in seizures by increasing Onfi to 20-20; however, it seems that it made her more sedated  Summary of 2017  RFN [id number], -250, Onfi 20-20, -200,  q8hrs  She has been more somnolent  She continues to have tonic seizures when she is asleep, eyes rolling body, arms will tense up with some twitching, last a few seconds, but will recur  There are no seizures during the daytime  We attempted to wean off of VPA due to ammonia / somnolence; but there was an increase in tonic seizures (tonic stiffness, eyes rolling upwards, and subtle arm (right?) jerking)  Hospital admission 10/10-10/26/2017, due to seizures in setting of infection, was put on continuous video EEG monitoring to determine her seizure type, seizure frequency, and rapid medication adjustments  Rapid med changes: d/c'ed lamotrigine, restarted levetiracetam, attempted discontinuation of valproic acid (more seizures, so restarted), attempted reduction in Onfi, and starting Fycompa  The patient's seizures were mostly based during sleep, tonic seizures  Summary of 2018  Started with RFN [id number], PER 8, LEV 6613-1537, CLB 5-15, -250,  TID  Due to excessive somnolence Onfi was weaned off  She was on continuous EEG monitoring when she was in hospital for PNA that showed very frequent tonic seizures during sleep  VPA is causing hyperammoniemia  She has had more admissions for somnolence / lethargy, VPA was reduced for transaminitis  She was tested for inborn error of metabolism with plasma amino acid, urine organic acid, and acylcarnitine levels  There were nonspecific elevations in some amino acid or organic acid but no pattern consistent with a specific inborn error of metabolism  Elevated carnitine level was consistent with supplementation    Higher doses of phenobarbital may also contribute to sedation, phenobarbital was reduced to 20mg but on follow-up she was on 20mL of oral solution (80mg / day)  There is variable reporting in the frequency of tonic seizures (these are the eyes are rolling back and shaking, then stop, then happen again in 10-15 minutes)  Her level of alertness is also variable with erratic sleep cycle  Summary 2019  We started the year with  TID, RFN [id number], -200, LEV 6023-6120, PER 10, PB 40 qHS  In December 2018, Hospital admission for septic shock and aspiration PNA  She had an EEG that captured recurrent tonic seizures during sleep but this is consistently found in all of her other EEG studies  Another hospital admission January 2019 for recurrent convulsive seizures  Due to sedation Phenobarbital was weaned off and Fycompa was increased to 10mg at bedtime  TPM increased to 250-250  Tried to wean off of VPA due to ammonia levels  We started Epidiolex in the Spring 2019, which seems to have improved seizure control  January 2019 - Her CNA reports that Simeon Simpson is no longer Simeon Simpson, she is essentially bed ridden  She does not eat and does not walk  Simeon Simpson usually participate in activities, walking, and eating food (if she was at a [facility] she would be able to eat a hamburger without difficulty)  I was able to speak to her father Jaylene Garcia and he reported that he too saw a significant decline in mental status  There was a phone call from Nahid Tsai reporting an increase in seizure activity (brief episodes tonic-myoclonic jerking); but subsequent reports did not notice an increase in activity  Weaned off of phenobarbital, suspecting that it was causing elevated transaminases  There is variable periods of wakefulness and alertness; seizures are reported sporadically, sometimes she seems to have clusters of seizures (during an office visit there were about 8 tonic seizures lasting 15-20 seconds)  We weaned her off of valproic acid and increased Epidiolex      June 2019, hospitalized for status epilepticus (multiple clinical seizures, every 5-6 minutes, associated with apnea)  Continuous EEG monitoring showed very frequent 15-30 seconds tonic seizures , whether this is different from her baseline is difficult to determine  Since she was on continuous EEG monitoring a number of medication trials were given  At one point it seems that lorazepam and more levetiracetam had improved seizures  Her tube feed was adjust to more protein and lower carbohydrates  2019 - multiple phone calls regarding recurrent seizures (generalized shaking, tonic body rigidity and clonic activity)    Summary 2020  RUF [id number], -250, LEV 1000 q8hr, -400, JESSICA 50-50, CLB 5 qHS  Replacement of her VNS in December 2019, [facility] from [address] [facility]  Seizure reporting had been sporadic  Despite medication adjustments, there has been no improvement in degree of wakefulness, nonverbal, calls out at times  She does not do much  Transitioned LEV to Brivaracetam   Increased clobazam to 10mg at bedtime on 5/28/2020  Unclear if there has been improvement in seizure frequency; but there were no admissions to hospital for seizures  Summary 2021  RUF [id number], -250, -400, JESSICA 75-75, CLB 5-10  She was admitted to Hospital on 2/2/2021 for multiple seizures in one day  Suellyn Rust was increased with an extra 5mg tab in AM   With the increase in Suellyn Rust, there has been no report of excessive sedation  She continues to have stretches of non-24 hour sleep cycle  She has periods when she appears to be active, smiling and laughter and periods when she has no interactions with those around her  Her seizures are very difficult to notice, unless someone is with her  We had tried to reduce her Brivaracetam dose as there was no clear improvement at higher dose and she has been losing weight  We started Vimpat at the end of the year      Summary 2022  RUF [id number], -250, -400, BRV 50-50, CLB 5-10  Often seems to be sleepy  She is sometimes on a play mat, she mostly lays there, sometimes she plays with her toys  She may scream or yell throughout the day  One time she was able to say her name and clap her hands and laugh at something  She has moments of increased alertness and awareness  She continues to have occasional witnessed seizures (body and head rigidity, eyes rolling up, sometimes mild twitching of the arms)  The last time a rescue medication was needed was back in 2021  We tried to reduce her dose of clobazam to reduce sedation and replace it with lacosamide  Special Features  Status epilepticus: yes  Self Injury Seizures: No  Precipitating Factors: menstrual cycle? ?     Epilepsy Risk Factors:  Abnormal pregnancy: unknown  Abnormal birth/: unknown  Abnormal Development: unknown developmental history  Febrile seizures, simple: unknown  Febrile seizures, complex: unknown  CNS infection: No  Mental retardation: Yes  Cerebral palsy: Yes  Head injury (moderate/severe): possibly anoxic brain injury  CNS neoplasm: No  CNS malformation: No  Neurosurgical procedure: No  Stroke: No  Alcohol abuse: No  Drug abuse: No  Family history Sz/epilepsy: unknown    Prior AEDs:  Rufinamide  Clobazam (excessive sedation)  Clonazepam  Levetiracetam (unclear if beneficial)  Lamotrigine (unclear if beneficial)  Topiramate (missed doses caused breakthrough convulsive type of seizures)  Valproate (elevated ammonia levels)  Fycompa (excess sedation)  Felbamate (listed as a drug allergy)  Phenobarbital (contributing to sedation)  Epidiolex (seems to help the most)  Brivaracetam  lacosamide    Prior workup:  x   Imaging:  CT head 2013, 2018  No acute intracranial pathology    3/20/2019  MRI brain w/wo contrast  Normal MR brain study  Symmetric hippocampal formations    EEGs:  Continuous video EEG monitoring 10/13 - 10/15/2017  Frequent generalized spike/polyspike-slow wave complexes during sleep  At times there are independent right more than left midtemporal spikes and bitemporal spikes    Innumerable generalized tonic seizures during sleep, generalized 1 Hz period discharges, that would become continuous for 30 seconds or generalized rhythmic alpha-beta discharges, associated with patient becoming rigid, tonic posturing with arms elevated and tense with subtle jerking of the upper body, eyes opening with upward deviation  Ictal patterns:  1 - Seconds of diffuse electrodecrement then paroxysmal fast activity followed by 2Hz spike wave discharges (clinically accompanied by posturing of the arms, then clonic jerking)    Continuous video EEG monitoring 10/18 - 10/25/2017  Diffuse background slowing with bursts of arrhythmic generalized spike wave discharges, with shifting lateralization, and independent left and  right temporal spikes  Mild eyelid myoclonia associated with brief bursts of 2-2 5 Hz generalized discharges  Tonic seizures with eelctrodecrement  There were innumerable tonic seizures; however by 10/25/2017 the frequency of these seizures did decrease in frequency  Continuous video EEG monitoring 12/1-12/5/2017  There are innumerable tonic seizures that are generally less than one minute in duration (30-40 seconds), these consists of subtle eye opening and tonic stiffening of arms, if longer then whole body stiffening with clonic jerking movements  These almost always occur exclusively out of sleep  These consist of 2-2 5 Hz generalized spike-slow wave complexes with generalized attenuation of activity (desynchronization) or paroxysmal fast activity    Per 24 hours count of seizures could be      12/19/2018 routine study  Frequent recurrent tonic seizures associated with paroxysmal generalized fast activity then generalized rhythmic discharges associated with eyes upward deviation, and myoclonic/clonic jerking of the upper body, excess beta activity  6/28-7/3/2019 continuous video EEG monitoring  Frequent clusters of tonic seizures (body rigidity, head flexions, eyes go up with dysconjugate gaze, and clonic activity of the arms for a few seconds), these are associated with GPFA and rhythmic spike-slow waves  There are also bilateral temporal focal epileptiform discharges  Labs:  7/15/2022  Lacosamide 4 3  topiramate 13 8  Clobazam 67 ng/mL  Desmethylclobazam 1300ng/mL    2/3/2023  CBC 11/13/42/165  /4 3/107/19/16/0 4/128  LFT 7 0/4 1/0 2/30/25/107  Topiramate 10 9  Lacosamide 6 0    General exam   Blood pressure 95/63, pulse 75, temperature (!) 97 °F (36 1 °C), temperature source Skin, height 5' (1 524 m), weight 38 4 kg (84 lb 9 6 oz)  Appearance: facial dysmorphia of intellectual disability, wide awake, screaming  Cardiovascular: regular rate and rhythm and normal heart sounds  Pulmonary: clear to auscultation   Abdomen: nondistended  Extremities: there is no edema    HEENT: moist mucus membranes, dysconjugate gaze   Fundoscopy: not assessed    Mental status  Orientation: awake, making vocalizations, sometimes seem to be irate  Due to her intellectual disability, she is nonverbal so Fund of Knowledge, Attention and Concentration, and Memory cannot be tested  Language: moans and make nonsensical vocalizations    Cranial Nerves  CN 1: not tested  CN 2: pupils are symmetric, round, and equally reactive to light   CN 3, 4, 6: no nystagmus is present  CN 5:not assessed  CN 7:muscles of facial expression are symmetric  CN 12:not assessed    Motor:  Bulk, Tone: low tone throughout all joints of the arms and legs  Pronation: not assessed  Strength: Unable to assess limb strength by confrontational testing  She seems to move bilateral arms and legs about the same    Abnormal movements: None    Sensory:  Lighttouch: not assessed    Coordination:  Unable to test    Reflexes:   Reflexes were not assessed    Past Medical/Surgical History:  Patient Active Problem List   Diagnosis   • 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[facility] )   • Underweight   • Osteoporosis   • Onychomycosis   • Hyperkeratosis   • Cerebral anoxic injury (HCC)   • Int[facility]   • Somnolence   • Low blood pressure   • Incontinence   • Skin breakdown   • MRSA colonization   • Right atrial enlargement   • Hypophosphatemia   • Sedated due to multiple medications   • Breast mass   • S/P placement of VNS (vagus nerve stimulation) device   • Moderate protein-calorie malnutrition (HCC)   • Dislodged gastrostomy tube   • Fatty infiltration of liver   • Intellectual disability with epilepsy ([facility] )   • Labyrinthine disorder   • Labial cyst   • PEG tube malfunction ([facility] )     Past Surgical History:   Procedure Laterality Date   • ABDOMINAL SURGERY     • CARDIAC PACEMAKER PLACEMENT      vns implant l chest   • IR GASTROSTOMY TUBE PLACEMENT  11/21/2019   • IR THORACENTESIS  12/17/2018   • JEJUNOSTOMY FEEDING TUBE      history of - most recently, PEG tube   • PEG TUBE PLACEMENT     • W[place] IMP STIM,CRANIAL,SUBQ,1 ARRAY Left 12/18/2019    Procedure: REPLACEMENT IMPLANTABLE PULSE GENERATOR FOR VAGAL NERVE STIMULATOR, LEFT CHEST;  Surgeon: Akhil Cabrera MD;  Location: [facility];  Service: Neurosurgery     Past Psychiatric History:  History of behavioral agitation but she is no longer on a one to one because she is generally too sedated      Medications:    Current Outpatient Medications:   •  acetaminophen (TYLENOL) 325 mg tablet, Take 650 mg by mouth every 6 (six) hours as needed for mild pain or fever, Disp: , Rfl:   •  Brivaracetam (Briviact) 10 MG/ML SOLN oral solution, 5 mL (50 mg total) by Per PEG Tube route every 12 (twelve) hours, Disp: 300 mL, Rfl: 5  •  cannabidiol (Epidiolex) 100 mg/ml SOLN, 4 mL (400 mg total) by Per G Tube route 2 (two) times a day, Disp: 240 mL, Rfl: 5  •  cloBAZam (ONFI) oral suspension, 4 mL (10 mg total) by Per PEG Tube route daily at bedtime, Disp: 120 mL, Rfl: 5  •  diazepam (VALIUM) 5 MG/ML solution, If the patient has a seizure lasting more than 3 minutes then give 1mL by PEG tube, may repeat 1mL if she continues to have seizure for more than 10 minutes  , Disp: 30 mL, Rfl: 1  •  lacosamide (Vimpat) 10 mg/mL, 20 mL (200 mg total) by Per G Tube route every 12 (twelve) hours, Disp: 1200 mL, Rfl: 5  •  Probiotic Product (ALEXANDER-BID PROBIOTIC PO), 1 tablet by Per G Tube route daily  , Disp: , Rfl:   •  rufinamide (BANZEL) 400 mg tablet, 4 tablets (1,600 mg total) by Per G Tube route every 12 (twelve) hours, Disp: 240 tablet, Rfl: 5  •  topiramate (TOPAMAX) 50 MG tablet, 5 tablets (250 mg total) by Per G Tube route every 12 (twelve) hours, Disp: 300 tablet, Rfl: 5  •  VITAMIN D PO, Take 1,000 mg by mouth in the morning Given in her G-tube, Disp: , Rfl:     Allergies: Allergies   Allergen Reactions   • Felbamate      Family history:  No family history on file  There is no family history of seizure, epilepsy or developmental delay  Social History  Living situation:  Resident of [place] [id number] - new owners now called the [employer]   reports that she has never smoked  She has never used smokeless tobacco  She reports that she does not drink alcohol and does not use drugs       Neurology/Epilepsy Procedure Note  VNS Interrogation and reprogramming    Model:   S/N: [id number]  Date of implant: 12/18/2019    Current settings:  Output Current 2 0 mAmp   Signal Frequency 20 Hz   Pulse Width 250 microsec   Signal On Time 30 sec   Signal Off Time 1 1 min     AutoStimulation settings:  AutoStim Output 2 0 mAmp   Pulse Width 500 microsec--> 250 msec   AutoStim On Time 30 sec     Magnet settings:  Mag Output 2 25 mAmp   Pulse Width 500 microsec   Mag On Time 60 sec     Tachycardia Detection:  Enabled  Heartbeat verification: not done  Heartbeat Sensitivity:  4  Threshold:  20% -->40%    Diagnostics:  Communication OK   Output current 2 0mAmp  Lead Impedance OK  Impedance value 1913 Ohm  IFI OK  Battery indicator 25-50%    Lifetime Magnet activation 8  % stimulation 35%     Decision making was of high-complexity due to the patient's high risk condition (seizures), psychiatric and neuropsychological comorbidities, behavioral problems, memory and cognitive problems and medication side effects  The total amount of time spent with the patient along with pre-chart and post-chart preparation was 46 minutes on the calendar day of the date of service  This included history taking, physical exam, review of ancillary testing, counseling provided to the patient regarding diagnosis, medications, treatment, and risk management, and other communication to the patient's providers and/or family  Start time: 11:30AM  End time: 12:16PM    The [place] was queried  No red flags were identified  The patient is low risk for abuse of the prescribed clobazam medication  Safe to proceed with prescription

## 2023-02-14 NOTE — PATIENT INSTRUCTIONS
Plan:   Medications are given by PEG  1 - Continue Epidiolex 100mg/mL 4 0 mL (400mg) every 12 hours (Goes to [facility] specialty pharmacy)  2 - Continue Briviact 10mg/mL 5mL (50mg) every 12 hours  3 - continue with Topiramate 250mg every 12 hours  4 - continue Banzel 400mg give 4 tabs every 12 hours  5 - Continue Clobazam at 10mg at bedtime (2 5mg/mL oral solution give 4mL via PEG tube)  6 - Increase Lacosamide 10mg/mL to 20mL every 12 hours    7 - give diazepam 5mg/mL 1 mL as needed for seizure last more than 3 minutes and repeat second dose for seizure lasting more than 10 minutes  8 - Call the office if there is increase in seizure frequency  9 - follow-up in 3 months with advanced practitioner

## 2023-03-07 ENCOUNTER — APPOINTMENT (EMERGENCY)
Dept: RADIOLOGY | Facility: HOSPITAL | Age: 43
End: 2023-03-07

## 2023-03-07 ENCOUNTER — HOSPITAL ENCOUNTER (EMERGENCY)
Facility: HOSPITAL | Age: 43
Discharge: HOME | End: 2023-03-07
Attending: EMERGENCY MEDICINE

## 2023-03-07 VITALS
RESPIRATION RATE: 16 BRPM | WEIGHT: 103.4 LBS | TEMPERATURE: 98.1 F | DIASTOLIC BLOOD PRESSURE: 68 MMHG | BODY MASS INDEX: 20.19 KG/M2 | OXYGEN SATURATION: 98 % | SYSTOLIC BLOOD PRESSURE: 103 MMHG | HEART RATE: 71 BPM

## 2023-03-07 DIAGNOSIS — Z46.59 ENCOUNTER FOR FEEDING TUBE PLACEMENT: Primary | ICD-10-CM

## 2023-03-07 RX ADMIN — IOHEXOL 80 ML: 300 INJECTION, SOLUTION INTRAVENOUS at 16:20

## 2023-03-07 NOTE — ED PROVIDER NOTES
Final Diagnosis:  1  Encounter for feeding tube placement        Chief Complaint   Patient presents with   • Medical Problem     Patient was sent from nursing home after pulling out her feeding tube  HPI  Patient presents for evaluation of G-tube dislodgment  Review of records show that the patient has a history of recurrent G-tube dislodgment  She has an extensive seizure history and is on multiple antiepileptics  Patient cannot provide any history on her own  Nursing facility states that it was noted that the tube was dislodged this evening when getting ready for evening medications  No other issues currently  Patient appears to be at her baseline  Unless otherwise specified:  - No language barrier    - History obtained from patient  - There are no limitations to the history obtained  - Previous charting was reviewed    PMH:   has a past medical history of ADHD, Anoxic brain damage ([address] ), Autistic disorder, Breakthrough seizure ([address] ) (8/1/2019), Dysphagia, Dysphagia, oropharyngeal phase, Gastrostomy tube dependent ([address] ), Hyperammonemia ([address] ), Hyperkeratosis, Hypotension, Intellectual disability, Lennox-Gastaut syndrome with tonic seizures ([address] ), Lethargy, Liver enzyme elevation, Onychomycosis, Osteoporosis, and Osteoporosis  PSH:   has a past surgical history that includes Jejunostomy feeding tube; PEG tube placement; Abdominal surgery; IR thoracentesis (12/17/2018); Cardiac pacemaker placement; IR gastrostomy tube placement (11/21/2019); and pr insj/rplcmt cranial neurostim pulse generator (Left, 12/18/2019)  ROS:  Review of Systems   - 13 point ROS was performed and all are normal unless stated in the history above  - Nursing note reviewed  Vitals reviewed  - Orders placed by myself and/or advanced practitioner / resident          PE:   Vitals:    03/07/23 1556 03/07/23 1557   BP: 103/68    BP Location: Left arm    Pulse: 71    Resp: 16    Temp: 98 1 °F (36 7 °C)    TempSrc: Tympanic    SpO2: 98%    Weight:  46 9 kg (103 lb 6 3 oz)     Vitals reviewed by me  Patient is nonverbal at baseline  Appears in her normal state  Admitted no obvious deficits  G-tube site appears to be healthy without signs of infection  1 area where possible track was previously  Patient is accompanied with her PEG tube which was a [address] [place] [address] Antonia  Unless otherwise specified above:    General: VS reviewed  Appears in NAD    Head: Normocephalic, atraumatic  Eyes: EOM-I      ENT: Atraumatic external nose and ears  No drooling  Neck: No JVD  CV: No pallor noted  Lungs:   No tachypnea  No respiratory distress    Abdomen:  Soft, non-tender, non-distended    MSK:   No obvious deformity    Skin: Dry, intact  No obvious rash  Psychiatric/Behavioral: Appropriate mood and affect   Exam: deferred    Physical Exam     Feeding Tube    Date/Time: 3/7/2023 4:25 PM  Performed by: Chandan Loyd MD  Authorized by: Chandan Loyd MD   Universal Protocol:  Consent: Verbal consent not obtained  Written consent not obtained  Patient location:  ED  Pre-procedure details: Old tube size:  16 Fr  Procedure details:     Patient position:  Supine    Procedure type:  Replacement    Tube size:  16 Fr    Bulb inflation volume:  5    Bulb inflation fluid:  Sterile water  Post-procedure details:     Placement/position confirmation:  X-ray and contrast    Placement difficulty:  Minimal    Bleeding:  Minimal    Patient tolerance of procedure: Tolerated well, no immediate complications       A:  - Nursing note reviewed  XR abdomen 1 view kub    (Results Pending)     Orders Placed This Encounter   Procedures   • FEEDING TUBE REPLACEMENT   • XR abdomen 1 view kub     Labs Reviewed - No data to display      Final Diagnosis:  1  Encounter for feeding tube placement        P:  -We will plan on tube replacement  If this is successful discharge back  If not will admit for further management  Appears in place on my evaluation  Will discharge back to facility  Unless otherwise noted the patient's medications were reviewed and they should continue as directed  Medications   iohexol (OMNIPAQUE) 300 mg/mL injection 50 mL (80 mL Other Given 3/7/23 1620)     Time reflects when diagnosis was documented in both MDM as applicable and the Disposition within this note     Time User Action Codes Description Comment    3/7/2023  4:27 PM John Files Add [Z46 [id number]] Encounter for feeding tube placement       ED Disposition     ED Disposition   Discharge    Condition   Stable    Date/Time   Tue Mar 7, 2023  4:26 PM    Comment   [facility] discharge to home/self care  Follow-up Information     Follow up With Specialties Details Why [address] [facility], [facility] [id number]          Patient's Medications   Discharge Prescriptions    No medications on file     No discharge procedures on file  Prior to Admission Medications   Prescriptions Last Dose Informant Patient Reported? Taking?    Brivaracetam (Briviact) 10 MG/ML SOLN oral solution   No No   Si mL (50 mg total) by Per PEG Tube route every 12 (twelve) hours   Probiotic Product (ALEXANDER-BID PROBIOTIC PO)   Yes No   Si tablet by Per G Tube route daily     VITAMIN D PO   Yes No   Sig: Take 1,000 mg by mouth in the morning Given in her G-tube   acetaminophen (TYLENOL) 325 mg tablet   Yes No   Sig: Take 650 mg by mouth every 6 (six) hours as needed for mild pain or fever   cannabidiol (Epidiolex) 100 mg/ml SOLN   No No   Si mL (400 mg total) by Per G Tube route 2 (two) times a day   cloBAZam (ONFI) oral suspension   No No   Si mL (10 mg total) by Per PEG Tube route daily at bedtime   diazepam (VALIUM) 5 MG/ML solution   No No   Sig: If the patient has a seizure lasting more than 3 minutes then give 1mL by PEG tube, may repeat 1mL if she continues to have seizure for more than 10 minutes  lacosamide (Vimpat) 10 mg/mL   No No   Si mL (200 mg total) by Per G Tube route every 12 (twelve) hours   rufinamide (BANZEL) 400 mg tablet   No No   Si tablets (1,600 mg total) by Per G Tube route every 12 (twelve) hours   topiramate (TOPAMAX) 50 MG tablet   No No   Si tablets (250 mg total) by Per G Tube route every 12 (twelve) hours      Facility-Administered Medications: None       Portions of the record may have been created with voice recognition software  Occasional wrong word or "sound a like" substitutions may have occurred due to the inherent limitations of voice recognition software  Read the chart carefully and recognize, using context, where substitutions have occurred      Electronically signed by:  MD La Hook MD  [phone]

## 2023-04-18 NOTE — SOCIAL WORK
Pt is being transferred to [facility]   I notified Orin Luke at [facility] of same  [Fever] : FEVER [de-identified] : URI, sneezing, cough for 2 days, transferring to our office. [FreeTextEntry6] : Now on formula, solid foods, vitamins-no, goat milk

## 2023-05-09 ENCOUNTER — TELEPHONE (OUTPATIENT)
Dept: NEUROLOGY | Facility: CLINIC | Age: 43
End: 2023-05-09

## 2023-05-09 NOTE — TELEPHONE ENCOUNTER
Called and spoke to patient's nurse   Patient's nurse confirmed upcoming apt with Sly Pemberton PA-C on 5/15/23 @ 1:15pm

## 2023-05-15 ENCOUNTER — OFFICE VISIT (OUTPATIENT)
Dept: NEUROLOGY | Facility: CLINIC | Age: 43
End: 2023-05-15

## 2023-05-15 VITALS
HEIGHT: 60 IN | SYSTOLIC BLOOD PRESSURE: 94 MMHG | WEIGHT: 90 LBS | RESPIRATION RATE: 18 BRPM | HEART RATE: 70 BPM | BODY MASS INDEX: 17.67 KG/M2 | DIASTOLIC BLOOD PRESSURE: 83 MMHG | TEMPERATURE: 98.4 F | OXYGEN SATURATION: 98 %

## 2023-05-15 DIAGNOSIS — G40.814 INTRACTABLE LENNOX-GASTAUT SYNDROME WITHOUT STATUS EPILEPTICUS (HCC): ICD-10-CM

## 2023-05-15 DIAGNOSIS — G40.813 INTRACTABLE LENNOX-GASTAUT SYNDROME WITH STATUS EPILEPTICUS (HCC): Primary | ICD-10-CM

## 2023-05-15 DIAGNOSIS — Z96.89 S/P PLACEMENT OF VNS (VAGUS NERVE STIMULATION) DEVICE: ICD-10-CM

## 2023-05-15 NOTE — PATIENT INSTRUCTIONS
Plan:   Medications are given by PEG  1 - Continue Epidiolex 100mg/mL 4 0 mL (400mg) every 12 hours (Goes to [facility] specialty pharmacy)  2 - Continue Briviact 10mg/mL 5mL (50mg) every 12 hours  3 - continue with Topiramate 250mg every 12 hours  4 - continue Banzel 400mg give 4 tabs every 12 hours  5 - Continue Clobazam at 10mg at bedtime (2 5mg/mL oral solution give 4mL via PEG tube)  6 - Continue Lacosamide 10mg/mL 20mL every 12 hours  7 - check blood work prior to next visit (CBC, CMP, lacosamide level, clobazam level, topiramate level)  8 - give diazepam 5mg/mL 1 mL as needed for seizure last more than 3 minutes and repeat second dose for seizure lasting more than 10 minutes  9 - Call the office if there is increase in seizure frequency  10 - follow-up in 3 months with advanced practitioner, 6 months with Dr Lucina Smith

## 2023-05-15 NOTE — PROGRESS NOTES
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) and progressive physical decline  She continues to have occasional witnessed seizures; these are often her tonic seizures with brief clonic upper body jerking  She has not had a convulsive seizure for some time  Her EEG studies show both generalized onset and potential risk for focal onset seizures  She had been doing better with clobazam and more recent clusters could have been triggered by weaning of clobazam  Lacosamide was increased at the last visit and her care team feels tonic seizures have improved  There have not been any clusters of convulsive seizures  She does not seem more sedated with the increase in lacosamide  For now, will continue current AEDs  Her VNS battery is stable at 25-50%  We will need to keep a close eye on her battery due to higher output current which can deplete the battery sooner  She was sleeping on the stretcher today, exam was very limited      Plan:   Medications are given by PEG  1 - Continue Epidiolex 100mg/mL 4 0 mL (400mg) every 12 hours (Goes to [facility] specialty pharmacy)  2 - Continue Briviact 10mg/mL 5mL (50mg) every 12 hours  3 - continue with Topiramate 250mg every 12 hours  4 - continue Banzel 400mg give 4 tabs every 12 hours  5 - Continue Clobazam at 10mg at bedtime (2 5mg/mL oral solution give 4mL via PEG tube)  6 - Continue Lacosamide 10mg/mL 20mL every 12 hours  7 - check blood work prior to next visit (CBC, CMP, lacosamide level, clobazam level, topiramate level)  8 - give diazepam 5mg/mL 1 mL as needed for seizure last more than 3 minutes and repeat second dose for seizure lasting more than 10 minutes  9 - Call the office if there is increase in seizure frequency  10 - follow-up in 3 months with advanced practitioner, 6 months with Dr Ludmila Watts       Diagnoses and all orders for this "visit:    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)    Intractable Lennox-Gastaut syndrome without status epilepticus (HCC)    S/P placement of VNS (vagus nerve stimulation) device           Subjective:    SCOTTY Parekh is a 44 y  o female here for follow-up evaluation of intractable epilepsy in the setting of LGS  Interval History 5/15/2023  At her last visit in February 2023, her lacosamide dose was increased to 200mg BID  Higher dose clobazam was continued  She previously had more seizures when clobazam dose was being reduced  She presents today with a caregiver, Erenest Lat  Erenest Lat is very familiar with Miah Jerome, works with her every day  Kemar Lakhani has not seen any convulsive seizures since her last visit  There have not been any calls to the office regarding convulsive seizures or clusters of seizures  Kemar Lakhani says once in a while she notices Miah Jerome is staring and eyes roll back and \"you can tell she is having a seizure\"  These episodes have also been much less  She says it can happen once every few months potentially  She did not bring any seizure log or notes  There does not seem to be more sedation since lacosamide was increased  She says Miah Jerome is generally happy and awake and playing on her play mat  There can be days she seems more tired or lethargic, but generally not the case  She sleeps well through the night  AED/side effects/compliance:  Banzel 400mg give 4 tabs twice a day   Topiramate 250mg tab twice a day   Epidiolex 400mg twice a day  Briviact 10mg/mL 50mg twice a day  Lacosamide 200-200  Onfi 4mL at bedtime (10mg)     Event/Seizure semiology:  Seizure semiology:  1  She was described to have drop attacks or spells with grunting sounds and falling to the floor  2  Her nurse commented on episodes of spasms or myoclonic jerking of the right arm  3  Generalized convulsions  4   Tonic seizures of eyes rolling back (mostly during sleep)     Prior " Epilepsy History:  Collective epilepsy history 11/6/2014 was previously evaluated by Dr Judy Milan including a hospitalization in February 2013 for status epilepticus, in the setting of missed doses of AEDs due to refusal to eat  She subsequently required anesthetic induced coma to stop her seizures and PEG tube was placed  She was seen almost every month for management of her seizures up until November 2013  She has medically refractory seizures and had a VNS placed possibly in the early 2000s and a generator changed in 2011  Unknown AEDs that were tried but felbamate is listed as an allergy  In 2011, her AEDs were clonazepam, levetiracetam, Depakote and Lamictal  Dr Judy Milan had started [facility] in 2011  In 2012, Sushila Hogan was added with the reduction of clonazepam  There were difficulty with documenting seizure frequency; but seizures were no longer daily occurrences but there were clusters of seizures  There would be good periods and bad periods of seizure frequency  Dr Judy Milan had been increasing the duty cycle of the VNS over the course of 2012 to 2013  Sometime between August 2013 and October 2013, topiramate was introduced  She was in status epilepticus in 2013, she was on Keppra, Banzel, Onfi, and Lamictal  She had an EEG at some point that showed multifocal spike-wave and background attenuation  She has intermittent agitation and day time somnolence  When she was hospitalized for status epilepticus, she was started on methylphenidate to help wake her up  Intake history November 2014:  VPA level 127  Her Valproic Acid dose was previously 250mg q6hrs based on Dr Joy Blanchard notes  Since July 2014, she has been receiving VPA 500mg q6hrs  She has not been hospitalized since September 2013  She was previously ambulatory with therapy  She spends most of her time sleeping for the past couple of months  She has also been receiving lactulose for elevated ammonia levels     (older drugs VPA, LVT, LMG, newer drugs added on since 2011 RFN, Onfi, TPM)      Summary of 2015: We decreased VPA from TDD 2000mg to 1000mg with increased agitation/combativeness, alternating days of exhaustion (no behavioral specialist has been involved)  are randomly reported by multiple staff members  Seizures are typically reported by CNA's or her one to one on the 3PM to 11PM shift  Seizures are described a brief events of eyes rolling back and arms going up in the air, followed by hair pulling or slapping herself  These seizures were reported as either sporadic or every other day episodes  There have been no documented grand mal seizures or drop attacks  She refuses to wear safety helmet, rips at her hair, and attempts to flip herself out of her chair and bed  Felton Hernandez can walk briefly but walks on her toes, she frequently will allow herself to fall down when she does not want to walk  It does not appear that methylphenidate has been useful in maintaining her level of wakefulness during the day  VNS generator change on 11/3/2015  The Model 103 (serial [id number]) was replaced with [employer] Model 105, serial [id number]  Weaned off of levetiracetam due to multiple medications and agitation; by the end of 2016, she was down to -500  Summary of 2016:  Started with RFN [id number], -250, CLB 0 5-0 5-25,  QID, -200 attempted to wean off of LEV and reduced the dose of VPA given that there were no reports of seizures and she was sedated, along with elevated ammonia levels  It was unclear as to how effective LEV was for her seizure control  When she missed a dose of TPM in September 2016, she had multiple seizures  We attempted to compensate for increase in seizures by increasing Onfi to 20-20; however, it seems that it made her more sedated  Summary of 2017  RFN [id number], -250, Onfi 20-20, -200,  q8hrs  She has been more somnolent   She continues to have tonic seizures when she is asleep, eyes rolling body, arms will tense up with some twitching, last a few seconds, but will recur  There are no seizures during the daytime  We attempted to wean off of VPA due to ammonia / somnolence; but there was an increase in tonic seizures (tonic stiffness, eyes rolling upwards, and subtle arm (right?) jerking)  Hospital admission 10/10-10/26/2017, due to seizures in setting of infection, was put on continuous video EEG monitoring to determine her seizure type, seizure frequency, and rapid medication adjustments  Rapid med changes: d/c'ed lamotrigine, restarted levetiracetam, attempted discontinuation of valproic acid (more seizures, so restarted), attempted reduction in Onfi, and starting Fycompa  The patient's seizures were mostly based during sleep, tonic seizures  Summary of 2018  Started with RFN [id number], PER 8, LEV 3064-6343, CLB 5-15, -250,  TID  Due to excessive somnolence Onfi was weaned off  She was on continuous EEG monitoring when she was in hospital for PNA that showed very frequent tonic seizures during sleep  VPA is causing hyperammoniemia  She has had more admissions for somnolence / lethargy, VPA was reduced for transaminitis  She was tested for inborn error of metabolism with plasma amino acid, urine organic acid, and acylcarnitine levels  There were nonspecific elevations in some amino acid or organic acid but no pattern consistent with a specific inborn error of metabolism  Elevated carnitine level was consistent with supplementation  Higher doses of phenobarbital may also contribute to sedation, phenobarbital was reduced to 20mg but on follow-up she was on 20mL of oral solution (80mg / day)  There is variable reporting in the frequency of tonic seizures (these are the eyes are rolling back and shaking, then stop, then happen again in 10-15 minutes)  Her level of alertness is also variable with erratic sleep cycle       Summary 2019  We started the year with  TID, RFN [id number], -200, LEV 8848-5168, PER 10, PB 40 qHS  In December 2018, Hospital admission for septic shock and aspiration PNA  She had an EEG that captured recurrent tonic seizures during sleep but this is consistently found in all of her other EEG studies  Another hospital admission January 2019 for recurrent convulsive seizures  Due to sedation Phenobarbital was weaned off and Fycompa was increased to 10mg at bedtime  TPM increased to 250-250  Tried to wean off of VPA due to ammonia levels  We started Epidiolex in the Spring 2019, which seems to have improved seizure control  January 2019 - Her CNA reports that Ranjith Damon is no longer Ranjith Buzzard, she is essentially bed ridden  She does not eat and does not walk  Ranjith Damon usually participate in activities, walking, and eating food (if she was at a [facility] she would be able to eat a hamburger without difficulty)  I was able to speak to her father Alexandria Randolph and he reported that he too saw a significant decline in mental status  There was a phone call from Nahid Tsai reporting an increase in seizure activity (brief episodes tonic-myoclonic jerking); but subsequent reports did not notice an increase in activity  Weaned off of phenobarbital, suspecting that it was causing elevated transaminases  There is variable periods of wakefulness and alertness; seizures are reported sporadically, sometimes she seems to have clusters of seizures (during an office visit there were about 8 tonic seizures lasting 15-20 seconds)  We weaned her off of valproic acid and increased Epidiolex  June 2019, hospitalized for status epilepticus (multiple clinical seizures, every 5-6 minutes, associated with apnea)  Continuous EEG monitoring showed very frequent 15-30 seconds tonic seizures , whether this is different from her baseline is difficult to determine  Since she was on continuous EEG monitoring a number of medication trials were given   At one point it seems that lorazepam and more levetiracetam had improved seizures  Her tube feed was adjust to more protein and lower carbohydrates  2019 - multiple phone calls regarding recurrent seizures (generalized shaking, tonic body rigidity and clonic activity)     Summary 2020  RUF [id number], -250, LEV 1000 q8hr, -400, JESSICA 50-50, CLB 5 qHS  Replacement of her VNS in December 2019, [facility] from [address] [facility] to [facility]  Seizure reporting had been sporadic  Despite medication adjustments, there has been no improvement in degree of wakefulness, nonverbal, calls out at times  She does not do much  Transitioned LEV to Brivaracetam  Increased clobazam to 10mg at bedtime on 5/28/2020  Unclear if there has been improvement in seizure frequency; but there were no admissions to hospital for seizures  Summary 2021  RUF [id number], -250, -400, JESSICA 75-75, CLB 5-10  She was admitted to Hospital on 2/2/2021 for multiple seizures in one day  Patsi Rochester was increased with an extra 5mg tab in AM  With the increase in Patsi Groom, there has been no report of excessive sedation  She continues to have stretches of non-24 hour sleep cycle  She has periods when she appears to be active, smiling and laughter and periods when she has no interactions with those around her  Her seizures are very difficult to notice, unless someone is with her  We had tried to reduce her Brivaracetam dose as there was no clear improvement at higher dose and she has been losing weight  We started Vimpat at the end of the year  Summary 2022  RUF [id number], -250, -400, BRV 50-50, CLB 5-10  Often seems to be sleepy  She is sometimes on a play mat, she mostly lays there, sometimes she plays with her toys  She may scream or yell throughout the day  One time she was able to say her name and clap her hands and laugh at something  She has moments of increased alertness and awareness     She continues to have occasional witnessed seizures (body and head rigidity, eyes rolling up, sometimes mild twitching of the arms)  The last time a rescue medication was needed was back in 2021  We tried to reduce her dose of clobazam to reduce sedation and replace it with lacosamide  Interval History 2023  From her last visit in 2023, her clobazam dose was decreased to 2 5mg at bedtime  There was a telephone call on 2023 reporting recurrent 5-10 second seizures in the morning about 4, she received diazepam 1mL dose  Seizures involved whole body shaking, eyes fixed, drooling  After the seizures, she was thrashing and screaming at the staff  For the next few days she continued to have tonic seizures  With the increase in seizures, clobazam was increased to 4mL (10mg) at bedtime  Ora Lagos, when she has a seizure she is tremulous eyes go back and very rigid  There were three seizures in the past 2-3 weeks  She has been alert, she likes music, she play mat  She is awake quite often, she sleeps through the night  Overall the same, she can be wild  Special Features  Status epilepticus: yes  Self Injury Seizures: No  Precipitating Factors: menstrual cycle? ?      Epilepsy Risk Factors:  Abnormal pregnancy: unknown  Abnormal birth/: unknown  Abnormal Development: unknown developmental history  Febrile seizures, simple: unknown  Febrile seizures, complex: unknown  CNS infection: No  Mental retardation: Yes  Cerebral palsy: Yes  Head injury (moderate/severe): possibly anoxic brain injury  CNS neoplasm: No  CNS malformation: No  Neurosurgical procedure: No  Stroke: No  Alcohol abuse: No  Drug abuse: No  Family history Sz/epilepsy: unknown     Prior AEDs:  Rufinamide  Clobazam (excessive sedation)  Clonazepam  Levetiracetam (unclear if beneficial)  Lamotrigine (unclear if beneficial)  Topiramate (missed doses caused breakthrough convulsive type of seizures)  Valproate (elevated ammonia levels)  Fycompa (excess sedation)  Felbamate (listed as a drug allergy)  Phenobarbital (contributing to sedation)  Epidiolex (seems to help the most)  Brivaracetam  lacosamide     Prior workup:  x   Imaging:  CT head 2/21/2013, 9/7/2018  No acute intracranial pathology     3/20/2019  MRI brain w/wo contrast  Normal MR brain study  Symmetric hippocampal formations     EEGs:  Continuous video EEG monitoring 10/13 - 10/15/2017  Frequent generalized spike/polyspike-slow wave complexes during sleep  At times there are independent right more than left midtemporal spikes and bitemporal spikes     Innumerable generalized tonic seizures during sleep, generalized 1 Hz period discharges, that would become continuous for 30 seconds or generalized rhythmic alpha-beta discharges, associated with patient becoming rigid, tonic posturing with arms elevated and tense with subtle jerking of the upper body, eyes opening with upward deviation  Ictal patterns:  1 - Seconds of diffuse electrodecrement then paroxysmal fast activity followed by 2Hz spike wave discharges (clinically accompanied by posturing of the arms, then clonic jerking)     Continuous video EEG monitoring 10/18 - 10/25/2017  Diffuse background slowing with bursts of arrhythmic generalized spike wave discharges, with shifting lateralization, and independent left and right temporal spikes  Mild eyelid myoclonia associated with brief bursts of 2-2 5 Hz generalized discharges  Tonic seizures with eelctrodecrement  There were innumerable tonic seizures; however by 10/25/2017 the frequency of these seizures did decrease in frequency  Continuous video EEG monitoring 12/1-12/5/2017  There are innumerable tonic seizures that are generally less than one minute in duration (30-40 seconds), these consists of subtle eye opening and tonic stiffening of arms, if longer then whole body stiffening with clonic jerking movements  These almost always occur exclusively out of sleep   These consist of 2-2 5 Hz generalized spike-slow wave complexes with generalized attenuation of activity (desynchronization) or paroxysmal fast activity  Per 24 hours count of seizures could be       12/19/2018 routine study  Frequent recurrent tonic seizures associated with paroxysmal generalized fast activity then generalized rhythmic discharges associated with eyes upward deviation, and myoclonic/clonic jerking of the upper body, excess beta activity  6/28-7/3/2019 continuous video EEG monitoring  Frequent clusters of tonic seizures (body rigidity, head flexions, eyes go up with dysconjugate gaze, and clonic activity of the arms for a few seconds), these are associated with GPFA and rhythmic spike-slow waves  There are also bilateral temporal focal epileptiform discharges  Labs:  2/3/2023  CBC 11/13/42/165  /4 3/107/19/16/0 4/128  LFT 7 0/4 1/0 2/30/25/107  Topiramate 10 9  Lacosamide 6 0    The following portions of the patient's history were reviewed and updated as appropriate: current medications, past family history, past medical history, past social history, past surgical history and problem list        Objective:    Blood pressure 94/83, pulse 70, temperature 98 4 °F (36 9 °C), temperature source Temporal, resp  rate 18, height 5' (1 524 m), weight 40 8 kg (90 lb), SpO2 98 %  Physical Exam  Constitutional:       Comments: Patient is sleeping on the stretcher today  At times would open her eyes, but then would fall back asleep   Eyes:      Pupils: Pupils are equal, round, and reactive to light  Neurological Exam  Mental Status    Patient is non-verbal and with severe intellectual disability  Unable to assess language, attention/concentration, memory, orientation       Cranial Nerves  CN III, IV, VI: Pupils equal round and reactive to light bilaterally  CN VII: Muscles of facial expression are symmetric   Motor   No abnormal involuntary movements  Decreased muscle bulk and tone throughout  Patient was sleeping in the fetal position on the stretcher  Did not perform confrontational muscle testing today  Sensory  Cannot assess due to patient's intellectual disability   Reflexes  Not assessed  Coordination    Not assessed  Gait    Non-ambulatory, on a stretcher today  VNS Interrogation Only   Model:   S/N: [id number]  Date of implant: 12/18/2019    Battery indicator 25-50%     Current settings:  Output Current 2 0 mAmp   Signal Frequency 20 Hz   Pulse Width 250 microsec   Signal On Time 30 sec   Signal Off Time 1 1 min      AutoStimulation settings:  AutoStim Output 2 0 mAmp   Pulse Width 500 microsec--> 250 msec   AutoStim On Time 30 sec      Magnet settings:  Mag Output 2 25 mAmp   Pulse Width 500 microsec   Mag On Time 60 sec      Current Outpatient Medications   Medication Sig Dispense Refill   • acetaminophen (TYLENOL) 325 mg tablet Take 650 mg by mouth every 6 (six) hours as needed for mild pain or fever     • Brivaracetam (Briviact) 10 MG/ML SOLN oral solution 5 mL (50 mg total) by Per PEG Tube route every 12 (twelve) hours 300 mL 5   • cannabidiol (Epidiolex) 100 mg/ml SOLN 4 mL (400 mg total) by Per G Tube route 2 (two) times a day 240 mL 5   • cloBAZam (ONFI) oral suspension 4 mL (10 mg total) by Per PEG Tube route daily at bedtime 120 mL 5   • diazepam (VALIUM) 5 MG/ML solution If the patient has a seizure lasting more than 3 minutes then give 1mL by PEG tube, may repeat 1mL if she continues to have seizure for more than 10 minutes   30 mL 1   • lacosamide (Vimpat) 10 mg/mL 20 mL (200 mg total) by Per G Tube route every 12 (twelve) hours 1200 mL 5   • Probiotic Product (ALEXANDER-BID PROBIOTIC PO) 1 tablet by Per G Tube route daily       • rufinamide (BANZEL) 400 mg tablet 4 tablets (1,600 mg total) by Per G Tube route every 12 (twelve) hours 240 tablet 5   • topiramate (TOPAMAX) 50 MG tablet 5 tablets (250 mg total) by Per G Tube route every 12 (twelve) hours 300 tablet 5   • VITAMIN D PO Take 1,000 mg by mouth in the morning Given in her G-tube       No current facility-administered medications for this visit  Past Medical History:   Diagnosis Date   • ADHD    • Anoxic brain damage ([facility] [address] )    • Autistic disorder    • Breakthrough seizure ([facility] [address] ) 8/1/2019   • Dysphagia    • Dysphagia, oropharyngeal phase    • Gastrostomy tube dependent (HCC)     14 Fr as of 05/19/2020   • Hyperammonemia (HCC)    • Hyperkeratosis    • Hypotension    • Intellectual disability    • Lennox-Gastaut syndrome with tonic seizures (HCC)    • Lethargy    • Liver enzyme elevation    • Onychomycosis    • Osteoporosis    • Osteoporosis       Past Surgical History:   Procedure Laterality Date   • ABDOMINAL SURGERY     • CARDIAC PACEMAKER PLACEMENT      vns implant l chest   • IR GASTROSTOMY TUBE PLACEMENT  11/21/2019   • IR THORACENTESIS  12/17/2018   • JEJUNOSTOMY FEEDING TUBE      history of - most recently, PEG tube   • PEG TUBE PLACEMENT     • HI INSJ/RPLCMT CRANIAL NEUROSTIM PULSE GENERATOR Left 12/18/2019    Procedure: REPLACEMENT IMPLANTABLE PULSE GENERATOR FOR VAGAL NERVE STIMULATOR, LEFT CHEST;  Surgeon: Brenda Zamora MD;  Location: [facility];  Service: Neurosurgery      Allergies   Allergen Reactions   • Felbamate       History reviewed  No pertinent family history  Past Psychiatric History:  History of behavioral agitation but she is no longer on a one to one because she is generally too sedated  Social History  Living situation:  Resident of [address] owners now called the [facility]   reports that she has never smoked  She has never used smokeless tobacco  She reports that she does not drink alcohol and does not use drugs  ROS:    Review of Systems   Constitutional: Negative for chills and fever  HENT: Negative for ear pain and sore throat  Eyes: Negative for pain and visual disturbance  Respiratory: Negative for cough and shortness of breath  Cardiovascular: Negative for chest pain and palpitations     Gastrointestinal: Negative for abdominal pain and vomiting  Genitourinary: Negative for dysuria and hematuria  Incontant   Musculoskeletal: Positive for gait problem (can't walk)  Negative for arthralgias  Skin: Negative for color change and rash  Neurological: Positive for seizures (clusters 2 weeks to a month ago) and weakness  Negative for syncope  Psychiatric/Behavioral: Positive for confusion  Slight mood swings   All other systems reviewed and are negative      I personally reviewed and updated the ROS as appropriate

## 2023-06-07 NOTE — PLAN OF CARE
DISCHARGE PLANNING     Discharge to home or other facility with appropriate resources Progressing        DISCHARGE PLANNING - CARE MANAGEMENT     Discharge to post-acute care or home with appropriate resources Progressing        INFECTION - ADULT     Absence or prevention of progression during hospitalization Progressing     Absence of fever/infection during neutropenic period Progressing        Knowledge Deficit     Patient/family/caregiver demonstrates understanding of disease process, treatment plan, medications, and discharge instructions Progressing        Nutrition/Hydration-ADULT     Nutrient/Hydration intake appropriate for improving, restoring or maintaining nutritional needs Progressing        PAIN - ADULT     Verbalizes/displays adequate comfort level or baseline comfort level Progressing        Potential for Falls     Patient will remain free of falls Progressing        Prexisting or High Potential for Compromised Skin Integrity     Skin integrity is maintained or improved Progressing        SAFETY ADULT     Patient will remain free of falls Progressing     Maintain or return to baseline ADL function Progressing     Maintain or return mobility status to optimal level Progressing Medicare Preventive Visit Patient Instructions  Thank you for completing your Welcome to Medicare Visit or Medicare Annual Wellness Visit today  Your next wellness visit will be due in one year (6/7/2024)  The screening/preventive services that you may require over the next 5-10 years are detailed below  Some tests may not apply to you based off risk factors and/or age  Screening tests ordered at today's visit but not completed yet may show as past due  Also, please note that scanned in results may not display below  Preventive Screenings:  Service Recommendations Previous Testing/Comments   Colorectal Cancer Screening  * Colonoscopy    * Fecal Occult Blood Test (FOBT)/Fecal Immunochemical Test (FIT)  * Fecal DNA/Cologuard Test  * Flexible Sigmoidoscopy Age: 39-70 years old   Colonoscopy: every 10 years (may be performed more frequently if at higher risk)  OR  FOBT/FIT: every 1 year  OR  Cologuard: every 3 years  OR  Sigmoidoscopy: every 5 years  Screening may be recommended earlier than age 39 if at higher risk for colorectal cancer  Also, an individualized decision between you and your healthcare provider will decide whether screening between the ages of 74-80 would be appropriate  Colonoscopy: 12/13/2021  FOBT/FIT: Not on file  Cologuard: Not on file  Sigmoidoscopy: Not on file    Screening Current     Breast Cancer Screening Age: 36 years old  Frequency: every 1-2 years  Not required if history of left and right mastectomy Mammogram: 01/13/2023    Screening Current   Cervical Cancer Screening Between the ages of 21-29, pap smear recommended once every 3 years  Between the ages of 33-67, can perform pap smear with HPV co-testing every 5 years     Recommendations may differ for women with a history of total hysterectomy, cervical cancer, or abnormal pap smears in past  Pap Smear: 12/10/2020    Screening Not Indicated   Hepatitis C Screening Once for adults born between 1945 and 1965  More frequently in patients at high risk for Hepatitis C Hep C Antibody: Not on file        Diabetes Screening 1-2 times per year if you're at risk for diabetes or have pre-diabetes Fasting glucose: No results in last 5 years (No results in last 5 years)  A1C: 11 2 (5/22/2023)  History Diabetes   Cholesterol Screening Once every 5 years if you don't have a lipid disorder  May order more often based on risk factors  Lipid panel: 01/04/2021    History Lipid Disorder     Other Preventive Screenings Covered by Medicare:  1  Abdominal Aortic Aneurysm (AAA) Screening: covered once if your at risk  You're considered to be at risk if you have a family history of AAA  2  Lung Cancer Screening: covers low dose CT scan once per year if you meet all of the following conditions: (1) Age 50-69; (2) No signs or symptoms of lung cancer; (3) Current smoker or have quit smoking within the last 15 years; (4) You have a tobacco smoking history of at least 20 pack years (packs per day multiplied by number of years you smoked); (5) You get a written order from a healthcare provider  3  Glaucoma Screening: covered annually if you're considered high risk: (1) You have diabetes OR (2) Family history of glaucoma OR (3)  aged 48 and older OR (3)  American aged 72 and older  3  Osteoporosis Screening: covered every 2 years if you meet one of the following conditions: (1) You're estrogen deficient and at risk for osteoporosis based off medical history and other findings; (2) Have a vertebral abnormality; (3) On glucocorticoid therapy for more than 3 months; (4) Have primary hyperparathyroidism; (5) On osteoporosis medications and need to assess response to drug therapy  · Last bone density test (DXA Scan): 01/11/2022   5  HIV Screening: covered annually if you're between the age of 15-65  Also covered annually if you are younger than 13 and older than 72 with risk factors for HIV infection   For pregnant patients, it is covered up to 3 times per pregnancy  Immunizations:  Immunization Recommendations   Influenza Vaccine Annual influenza vaccination during flu season is recommended for all persons aged >= 6 months who do not have contraindications   Pneumococcal Vaccine   * Pneumococcal conjugate vaccine = PCV13 (Prevnar 13), PCV15 (Vaxneuvance), PCV20 (Prevnar 20)  * Pneumococcal polysaccharide vaccine = PPSV23 (Pneumovax) Adults 25-60 years old: 1-3 doses may be recommended based on certain risk factors  Adults 72 years old: 1-2 doses may be recommended based off what pneumonia vaccine you previously received   Hepatitis B Vaccine 3 dose series if at intermediate or high risk (ex: diabetes, end stage renal disease, liver disease)   Tetanus (Td) Vaccine - COST NOT COVERED BY MEDICARE PART B Following completion of primary series, a booster dose should be given every 10 years to maintain immunity against tetanus  Td may also be given as tetanus wound prophylaxis  Tdap Vaccine - COST NOT COVERED BY MEDICARE PART B Recommended at least once for all adults  For pregnant patients, recommended with each pregnancy  Shingles Vaccine (Shingrix) - COST NOT COVERED BY MEDICARE PART B  2 shot series recommended in those aged 48 and above     Health Maintenance Due:      Topic Date Due   • Hepatitis C Screening  Never done   • Lung Cancer Screening  Never done   • Breast Cancer Screening: Mammogram  01/13/2025   • Colorectal Cancer Screening  12/12/2026   • DXA SCAN  01/11/2027     Immunizations Due:      Topic Date Due   • COVID-19 Vaccine (4 - Pfizer series) 02/15/2022     Advance Directives   What are advance directives? Advance directives are legal documents that state your wishes and plans for medical care  These plans are made ahead of time in case you lose your ability to make decisions for yourself  Advance directives can apply to any medical decision, such as the treatments you want, and if you want to donate organs     What are the types of advance directives? There are many types of advance directives, and each state has rules about how to use them  You may choose a combination of any of the following:  · Living will: This is a written record of the treatment you want  You can also choose which treatments you do not want, which to limit, and which to stop at a certain time  This includes surgery, medicine, IV fluid, and tube feedings  · Durable power of  for healthcare [facility]): This is a written record that states who you want to make healthcare choices for you when you are unable to make them for yourself  This person, called a proxy, is usually a family member or a friend  You may choose more than 1 proxy  · Do not resuscitate (DNR) order:  A DNR order is used in case your heart stops beating or you stop breathing  It is a request not to have certain forms of treatment, such as CPR  A DNR order may be included in other types of advance directives  · Medical directive: This covers the care that you want if you are in a coma, near death, or unable to make decisions for yourself  You can list the treatments you want for each condition  Treatment may include pain medicine, surgery, blood transfusions, dialysis, IV or tube feedings, and a ventilator (breathing machine)  · Values history: This document has questions about your views, beliefs, and how you feel and think about life  This information can help others choose the care that you would choose  Why are advance directives important? An advance directive helps you control your care  Although spoken wishes may be used, it is better to have your wishes written down  Spoken wishes can be misunderstood, or not followed  Treatments may be given even if you do not want them  An advance directive may make it easier for your family to make difficult choices about your care  Fall Prevention    Fall prevention  includes ways to make your home and other areas safer   It also includes ways you can move more carefully to prevent a fall  Health conditions that cause changes in your blood pressure, vision, or muscle strength and coordination may increase your risk for falls  Medicines may also increase your risk for falls if they make you dizzy, weak, or sleepy  Fall prevention tips:   · Stand or sit up slowly  · Use assistive devices as directed  · Wear shoes that fit well and have soles that   · Wear a personal alarm  · Stay active  · Manage your medical conditions  Home Safety Tips:  · Add items to prevent falls in the bathroom  · Keep paths clear  · Install bright lights in your home  · Keep items you use often on shelves within reach  · Paint or place reflective tape on the edges of your stairs  Urinary Incontinence   Urinary incontinence (UI)  is when you lose control of your bladder  UI develops because your bladder cannot store or empty urine properly  The 3 most common types of UI are stress incontinence, urge incontinence, or both  Medicines:   · May be given to help strengthen your bladder control  Report any side effects of medication to your healthcare provider  Do pelvic muscle exercises often:  Your pelvic muscles help you stop urinating  Squeeze these muscles tight for 5 seconds, then relax for 5 seconds  Gradually work up to squeezing for 10 seconds  Do 3 sets of 15 repetitions a day, or as directed  This will help strengthen your pelvic muscles and improve bladder control  Train your bladder:  Go to the bathroom at set times, such as every 2 hours, even if you do not feel the urge to go  You can also try to hold your urine when you feel the urge to go  For example, hold your urine for 5 minutes when you feel the urge to go  As that becomes easier, hold your urine for 10 minutes  Self-care:   · Keep a UI record  Write down how often you leak urine and how much you leak  Make a note of what you were doing when you leaked urine    · Drink liquids as directed  You may need to limit the amount of liquid you drink to help control your urine leakage  Do not drink any liquid right before you go to bed  Limit or do not have drinks that contain caffeine or alcohol  · Prevent constipation  Eat a variety of high-fiber foods  Good examples are high-fiber cereals, beans, vegetables, and whole-grain breads  Walking is the best way to trigger your intestines to have a bowel movement  · Exercise regularly and maintain a healthy weight  Weight loss and exercise will decrease pressure on your bladder and help you control your leakage  · Use a catheter as directed  to help empty your bladder  A catheter is a tiny, plastic tube that is put into your bladder to drain your urine  · Go to behavior therapy as directed  Behavior therapy may be used to help you learn to control your urge to urinate  Weight Management   Why it is important to manage your weight:  Being overweight increases your risk of health conditions such as heart disease, high blood pressure, type 2 diabetes, and certain types of cancer  It can also increase your risk for osteoarthritis, sleep apnea, and other respiratory problems  Aim for a slow, steady weight loss  Even a small amount of weight loss can lower your risk of health problems  How to lose weight safely:  A safe and healthy way to lose weight is to eat fewer calories and get regular exercise  You can lose up about 1 pound a week by decreasing the number of calories you eat by 500 calories each day  Healthy meal plan for weight management:  A healthy meal plan includes a variety of foods, contains fewer calories, and helps you stay healthy  A healthy meal plan includes the following:  · Eat whole-grain foods more often  A healthy meal plan should contain fiber  Fiber is the part of grains, fruits, and vegetables that is not broken down by your body  Whole-grain foods are healthy and provide extra fiber in your diet   Some examples of whole-grain foods are whole-wheat breads and pastas, oatmeal, brown rice, and bulgur  · Eat a variety of vegetables every day  Include dark, leafy greens such as spinach, kale, kristie greens, and mustard greens  Eat yellow and orange vegetables such as carrots, sweet potatoes, and winter squash  · Eat a variety of fruits every day  Choose fresh or canned fruit (canned in its own juice or light syrup) instead of juice  Fruit juice has very little or no fiber  · Eat low-fat dairy foods  Drink fat-free (skim) milk or 1% milk  Eat fat-free yogurt and low-fat cottage cheese  Try low-fat cheeses such as mozzarella and other reduced-fat cheeses  · Choose meat and other protein foods that are low in fat  Choose beans or other legumes such as split peas or lentils  Choose fish, skinless poultry (chicken or turkey), or lean cuts of red meat (beef or pork)  Before you cook meat or poultry, cut off any visible fat  · Use less fat and oil  Try baking foods instead of frying them  Add less fat, such as margarine, sour cream, regular salad dressing and mayonnaise to foods  Eat fewer high-fat foods  Some examples of high-fat foods include french fries, doughnuts, ice cream, and cakes  · Eat fewer sweets  Limit foods and drinks that are high in sugar  This includes candy, cookies, regular soda, and sweetened drinks  Exercise:  Exercise at least 30 minutes per day on most days of the week  Some examples of exercise include walking, biking, dancing, and swimming  You can also fit in more physical activity by taking the stairs instead of the elevator or parking farther away from stores  Ask your healthcare provider about the best exercise plan for you  © Copyright [employer] 2018 Information is for End User's use only and may not be sold, redistributed or otherwise used for commercial purposes   All illustrations and images included in CareNotes® are the copyrighted property of [employer]  or [facility]

## 2023-08-16 ENCOUNTER — OFFICE VISIT (OUTPATIENT)
Dept: NEUROLOGY | Facility: CLINIC | Age: 43
End: 2023-08-16
Payer: MEDICARE

## 2023-08-16 VITALS
DIASTOLIC BLOOD PRESSURE: 59 MMHG | WEIGHT: 95.2 LBS | BODY MASS INDEX: 18.69 KG/M2 | TEMPERATURE: 97.5 F | RESPIRATION RATE: 16 BRPM | HEIGHT: 60 IN | HEART RATE: 80 BPM | SYSTOLIC BLOOD PRESSURE: 102 MMHG

## 2023-08-16 DIAGNOSIS — G40.814 INTRACTABLE LENNOX-GASTAUT SYNDROME WITHOUT STATUS EPILEPTICUS (HCC): Primary | ICD-10-CM

## 2023-08-16 PROCEDURE — 99214 OFFICE O/P EST MOD 30 MIN: CPT | Performed by: PHYSICIAN ASSISTANT

## 2023-08-16 NOTE — PATIENT INSTRUCTIONS
Overall stable, noted to have 2 documented seizures 6/29/23 and 7/10/23. Does not seem she has had any large clusters of generalized tonic-clonic seizures (these 2 events were more consistent with tonic seizures). Will continue current mediations and check blood work prior to next visit. VNS interrogated, battery is still acceptable at 25-50%.     Plan:   Medications are given by PEG  1 - Continue Epidiolex 100mg/mL 4.0 mL (400mg) every 12 hours (Goes to [facility] specialty pharmacy)  2 - Continue Briviact 10mg/mL 5mL (50mg) every 12 hours  3 - continue with Topiramate 250mg every 12 hours  4 - continue Banzel 400mg give 4 tabs every 12 hours  5 - Continue Clobazam at 10mg at bedtime (2.5mg/mL oral solution give 4mL via PEG tube)  6 - Continue Lacosamide 10mg/mL 20mL every 12 hours  7 - check blood work prior to next visit (CBC, CMP, lacosamide level, clobazam level, topiramate level)  8 - give diazepam 5mg/mL 1 mL as needed for seizure last more than 3 minutes and repeat second dose for seizure lasting more than 10 minutes  9 - Call the office if there is increase in seizure frequency  10 - follow-up in 3 months with Dr. Dionne Vora as scheduled on 11/16/23 at 11:30am

## 2023-08-16 NOTE — PROGRESS NOTES
Patient ID: Ashtyn Hardy is a 44 y.o. female. Assessment:  Ms. Killian Mcdonald is a 44 y.o. 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 She continues to have occasional witnessed seizures; these are often her tonic seizures with brief clonic upper body jerking. She has not had a convulsive seizure for some time. Her EEG studies show both generalized onset and potential risk for focal onset seizures. Tonic seizures seem to have improved with increase of lacosamide several months ago. She had more clustering of seizures when we tried to wean her off clobazam last year, and she has done better since increasing back to prior dosing. Since her last visit she had 1 documented brief tonic seizure, and 1 cluster of tonic seizures (no convulsive activity described), which was aborted with diazepam and swiping her magnet. She is very awake and active today. Will continue current AEDs but will check serum drug levels at this time. Her VNS battery is stable at 25-50%. We will need to keep a close eye on her battery due to higher output current which can deplete the battery sooner.      Plan:   Medications are given by PEG  1 - Continue Epidiolex 100mg/mL 4.0 mL (400mg) every 12 hours (Goes to [facility] pharmacy)  2 - Continue Briviact 10mg/mL 5mL (50mg) every 12 hours  3 - continue with Topiramate 250mg every 12 hours  4 - continue Banzel 400mg give 4 tabs every 12 hours  5 - Continue Clobazam at 10mg at bedtime (2.5mg/mL oral solution give 4mL via PEG tube)  6 - Continue Lacosamide 10mg/mL 20mL every 12 hours  7 - check blood work prior to next visit (CBC, CMP, lacosamide level, clobazam level, topiramate level)  8 - give diazepam 5mg/mL 1 mL as needed for seizure last more than 3 minutes and repeat second dose for seizure lasting more than 10 minutes  9 - Call the office if there is increase in seizure frequency  10 - follow-up in 3 months with Dr. Danna Adkins as scheduled        Diagnoses and all orders for this visit:    Intractable Lennox-Gastaut syndrome without status epilepticus ([address])  -     CBC and differential; Future  -     Comprehensive metabolic panel; Future  -     Lacosamide; Future  -     Clobazam; Future  -     Topiramate level; Future           Subjective:    HPI    Max Alegria is a 44 y.o. female here for follow-up evaluation of intractable epilepsy in the setting of LGS. Interval History 8/16/2023  Last seen 5/15/23. At that time, there were no reported large clusters of seizures (although no seizure logs were present), therefore no med changes were made. Today she presents with Jake Morris, who does not work regularly with the patient, but she brought paperwork with her with detailed notes on how patient has been doing. Overall, appears things have been stable. There have been 2 documented seizures since her last visit in the logs. 7/10/23 noted to have tense body, face red, drooling, lasted approx. 20 seconds. 6/29/23 noted to have seizure like activity during therapy. Per log, patient had tense body, arms flexed, face red, labored breathing. First event lasted 15 seconds. Seizure activity recurred lasting 60 seconds, this time was given a dose of diazepam, but notes say seizure activity continued. Magnet was swiped with minimal effectiveness. It says a third display of seizure activity then occurred lasting 60 seconds, a second dose of diazepam was given which was effective and no further seizure activity. No mention of generalized shaking, was more of a tonic seizure. Alyssia Roberts is very awake and active today, which is different than her last visit (she was asleep on the stretcher the entire visit).     AED/side effects/compliance:  Banzel 400mg give 4 tabs twice a day   Topiramate 250mg tab twice a day   Epidiolex 400mg twice a day  Briviact 10mg/mL 50mg twice a day  Lacosamide 200-200  Onfi 4mL at bedtime (10mg) Event/Seizure semiology:  Seizure semiology:  1. She was described to have drop attacks or spells with grunting sounds and falling to the floor. 2. Her nurse commented on episodes of spasms or myoclonic jerking of the right arm. 3. Generalized convulsions  4. Tonic seizures of eyes rolling back (mostly during sleep)     Prior Epilepsy History:  Collective epilepsy history 11/6/2014 was previously evaluated by Dr. Lula Vogt including a hospitalization in February 2013 for status epilepticus, in the setting of missed doses of AEDs due to refusal to eat. She subsequently required anesthetic induced coma to stop her seizures and PEG tube was placed. She was seen almost every month for management of her seizures up until November 2013. She has medically refractory seizures and had a VNS placed possibly in the early 2000s and a generator changed in 2011. Unknown AEDs that were tried but felbamate is listed as an allergy. In 2011, her AEDs were clonazepam, levetiracetam, Depakote and Lamictal. Dr. Lula Vogt had started [facility] in 2011. In 2012, Ginger Freeland was added with the reduction of clonazepam. There were difficulty with documenting seizure frequency; but seizures were no longer daily occurrences but there were clusters of seizures. There would be good periods and bad periods of seizure frequency. Dr. Lula Vogt had been increasing the duty cycle of the VNS over the course of 2012 to 2013. Sometime between August 2013 and October 2013, topiramate was introduced. She was in status epilepticus in 2013, she was on Keppra, Banzel, Onfi, and Lamictal. She had an EEG at some point that showed multifocal spike-wave and background attenuation. She has intermittent agitation and day time somnolence. When she was hospitalized for status epilepticus, she was started on methylphenidate to help wake her up. Intake history November 2014:  VPA level 127. Her Valproic Acid dose was previously 250mg q6hrs based on Dr. Izabel Tarango notes.  Since July 2014, she has been receiving VPA 500mg q6hrs. She has not been hospitalized since September 2013. She was previously ambulatory with therapy. She spends most of her time sleeping for the past couple of months. She has also been receiving lactulose for elevated ammonia levels. (older drugs VPA, LVT, LMG, newer drugs added on since 2011 RFN, Onfi, TPM)      Summary of 2015: We decreased VPA from TDD 2000mg to 1000mg with increased agitation/combativeness, alternating days of exhaustion (no behavioral specialist has been involved). are randomly reported by multiple staff members. Seizures are typically reported by CNA's or her one to one on the 3PM to 11PM shift. Seizures are described a brief events of eyes rolling back and arms going up in the air, followed by hair pulling or slapping herself. These seizures were reported as either sporadic or every other day episodes. There have been no documented grand mal seizures or drop attacks. She refuses to wear safety helmet, rips at her hair, and attempts to flip herself out of her chair and bed. Judah Chne can walk briefly but walks on her toes, she frequently will allow herself to fall down when she does not want to walk. It does not appear that methylphenidate has been useful in maintaining her level of wakefulness during the day. VNS generator change on 11/3/2015. The Model 103 (serial [id number]) was replaced with [employer] Model 105, serial [id number]. Weaned off of levetiracetam due to multiple medications and agitation; by the end of 2016, she was down to -500. Summary of 2016:  Started with RFN [id number], -250, CLB 0.5-0.5-25,  QID, -200 attempted to wean off of LEV and reduced the dose of VPA given that there were no reports of seizures and she was sedated, along with elevated ammonia levels. It was unclear as to how effective LEV was for her seizure control. When she missed a dose of TPM in September 2016, she had multiple seizures. We attempted to compensate for increase in seizures by increasing Onfi to 20-20; however, it seems that it made her more sedated. Summary of 2017  RFN [id number], -250, Onfi 20-20, -200,  q8hrs. She has been more somnolent. She continues to have tonic seizures when she is asleep, eyes rolling body, arms will tense up with some twitching, last a few seconds, but will recur. There are no seizures during the daytime. We attempted to wean off of VPA due to ammonia / somnolence; but there was an increase in tonic seizures (tonic stiffness, eyes rolling upwards, and subtle arm (right?) jerking). Hospital admission 10/10-10/26/2017, due to seizures in setting of infection, was put on continuous video EEG monitoring to determine her seizure type, seizure frequency, and rapid medication adjustments. Rapid med changes: d/c'ed lamotrigine, restarted levetiracetam, attempted discontinuation of valproic acid (more seizures, so restarted), attempted reduction in Onfi, and starting Fycompa. The patient's seizures were mostly based during sleep, tonic seizures. Summary of 2018  Started with RFN [id number], PER 8, LEV 0490-7608, CLB 5-15, -250,  TID. Due to excessive somnolence Onfi was weaned off. She was on continuous EEG monitoring when she was in hospital for PNA that showed very frequent tonic seizures during sleep. VPA is causing hyperammoniemia. She has had more admissions for somnolence / lethargy, VPA was reduced for transaminitis. She was tested for inborn error of metabolism with plasma amino acid, urine organic acid, and acylcarnitine levels. There were nonspecific elevations in some amino acid or organic acid but no pattern consistent with a specific inborn error of metabolism. Elevated carnitine level was consistent with supplementation.  Higher doses of phenobarbital may also contribute to sedation, phenobarbital was reduced to 20mg but on follow-up she was on 20mL of oral solution (80mg / day). There is variable reporting in the frequency of tonic seizures (these are the eyes are rolling back and shaking, then stop, then happen again in 10-15 minutes). Her level of alertness is also variable with erratic sleep cycle. Summary 2019  We started the year with  TID, RFN [id number], -200, LEV 7276-3252, PER 10, PB 40 qHS. In December 2018, Hospital admission for septic shock and aspiration PNA. She had an EEG that captured recurrent tonic seizures during sleep but this is consistently found in all of her other EEG studies. Another hospital admission January 2019 for recurrent convulsive seizures. Due to sedation Phenobarbital was weaned off and Fycompa was increased to 10mg at bedtime. TPM increased to 250-250. Tried to wean off of VPA due to ammonia levels. We started Epidiolex in the Spring 2019, which seems to have improved seizure control. January 2019 - Her CNA reports that Anup Yañez is no longer Anup Yañez, she is essentially bed ridden. She does not eat and does not walk. Anup Yañez usually participate in activities, walking, and eating food (if she was at a [facility] she would be able to eat a hamburger without difficulty). I was able to speak to her father Eze Stark and he reported that he too saw a significant decline in mental status. There was a phone call from [address] [place] [address] reporting an increase in seizure activity (brief episodes tonic-myoclonic jerking); but subsequent reports did not notice an increase in activity. Weaned off of phenobarbital, suspecting that it was causing elevated transaminases. There is variable periods of wakefulness and alertness; seizures are reported sporadically, sometimes she seems to have clusters of seizures (during an office visit there were about 8 tonic seizures lasting 15-20 seconds). We weaned her off of valproic acid and increased Epidiolex.    June 2019, hospitalized for status epilepticus (multiple clinical seizures, every 5-6 minutes, associated with apnea). Continuous EEG monitoring showed very frequent 15-30 seconds tonic seizures , whether this is different from her baseline is difficult to determine. Since she was on continuous EEG monitoring a number of medication trials were given. At one point it seems that lorazepam and more levetiracetam had improved seizures. Her tube feed was adjust to more protein and lower carbohydrates. 2019 - multiple phone calls regarding recurrent seizures (generalized shaking, tonic body rigidity and clonic activity)     Summary 2020  RUF [id number], -250, LEV 1000 q8hr, -400, JESSICA 50-50, CLB 5 qHS. Replacement of her VNS in December 2019, went from [address] [place] [address] to [address] [place] [address]. Seizure reporting had been sporadic. Despite medication adjustments, there has been no improvement in degree of wakefulness, nonverbal, calls out at times. She does not do much. Transitioned LEV to Brivaracetam. Increased clobazam to 10mg at bedtime on 5/28/2020. Unclear if there has been improvement in seizure frequency; but there were no admissions to hospital for seizures. Summary 2021  RUF [id number], -250, -400, JESSICA 75-75, CLB 5-10. She was admitted to Hospital on 2/2/2021 for multiple seizures in one day. Luh Pitkin was increased with an extra 5mg tab in AM. With the increase in Luh Pitkin, there has been no report of excessive sedation. She continues to have stretches of non-24 hour sleep cycle. She has periods when she appears to be active, smiling and laughter and periods when she has no interactions with those around her. Her seizures are very difficult to notice, unless someone is with her. We had tried to reduce her Brivaracetam dose as there was no clear improvement at higher dose and she has been losing weight. We started Vimpat at the end of the year. Summary 2022  RUF [id number], -250, -400, BRV 50-50, CLB 5-10. Often seems to be sleepy.  She is sometimes on a play mat, she mostly lays there, sometimes she plays with her toys. She may scream or yell throughout the day. One time she was able to say her name and clap her hands and laugh at something. She has moments of increased alertness and awareness. She continues to have occasional witnessed seizures (body and head rigidity, eyes rolling up, sometimes mild twitching of the arms). The last time a rescue medication was needed was back in September 2021. We tried to reduce her dose of clobazam to reduce sedation and replace it with lacosamide. Interval History 2/14/2023  From her last visit in October 2023, her clobazam dose was decreased to 2.5mg at bedtime. There was a telephone call on 2/2/2023 reporting recurrent 5-10 second seizures in the morning about 4, she received diazepam 1mL dose. Seizures involved whole body shaking, eyes fixed, drooling. After the seizures, she was thrashing and screaming at the staff. For the next few days she continued to have tonic seizures. With the increase in seizures, clobazam was increased to 4mL (10mg) at bedtime. [employer], when she has a seizure she is tremulous eyes go back and very rigid. There were three seizures in the past 2-3 weeks. She has been alert, she likes music, she play mat. She is awake quite often, she sleeps through the night. Overall the same, she can be wild. Interval History 5/15/2023  At her last visit in February 2023, her lacosamide dose was increased to 200mg BID. Higher dose clobazam was continued. She previously had more seizures when clobazam dose was being reduced. She presents today with a caregiver, Gerri Jama. Gerri Jama is very familiar with Merced Wells, works with her every day. Gerri Jama has not seen any convulsive seizures since her last visit. There have not been any calls to the office regarding convulsive seizures or clusters of seizures.  Gerri Jama says once in a while she notices Merced Wells is staring and eyes roll back and "you can tell she is having a seizure". These episodes have also been much less. She says it can happen once every few months potentially. She did not bring any seizure log or notes. There does not seem to be more sedation since lacosamide was increased. She says [facility] is generally happy and awake and playing on her play mat. There can be days she seems more tired or lethargic, but generally not the case. She sleeps well through the night. Special Features  Status epilepticus: yes  Self Injury Seizures: No  Precipitating Factors: menstrual cycle? ?      Epilepsy Risk Factors:  Abnormal pregnancy: unknown  Abnormal birth/: unknown  Abnormal Development: unknown developmental history  Febrile seizures, simple: unknown  Febrile seizures, complex: unknown  CNS infection: No  Mental retardation: Yes  Cerebral palsy: Yes  Head injury (moderate/severe): possibly anoxic brain injury  CNS neoplasm: No  CNS malformation: No  Neurosurgical procedure: No  Stroke: No  Alcohol abuse: No  Drug abuse: No  Family history Sz/epilepsy: unknown     Prior AEDs:  Rufinamide  Clobazam (excessive sedation)  Clonazepam  Levetiracetam (unclear if beneficial)  Lamotrigine (unclear if beneficial)  Topiramate (missed doses caused breakthrough convulsive type of seizures)  Valproate (elevated ammonia levels)  Fycompa (excess sedation)  Felbamate (listed as a drug allergy)  Phenobarbital (contributing to sedation)  Epidiolex (seems to help the most)  Brivaracetam  lacosamide     Prior workup:  x   Imaging:  CT head 2013, 2018  No acute intracranial pathology     3/20/2019  MRI brain w/wo contrast  Normal MR brain study  Symmetric hippocampal formations     EEGs:  Continuous video EEG monitoring 10/13 - 10/15/2017  Frequent generalized spike/polyspike-slow wave complexes during sleep  At times there are independent right more than left midtemporal spikes and bitemporal spikes     Innumerable generalized tonic seizures during sleep, generalized 1 Hz period discharges, that would become continuous for 30 seconds or generalized rhythmic alpha-beta discharges, associated with patient becoming rigid, tonic posturing with arms elevated and tense with subtle jerking of the upper body, eyes opening with upward deviation. Ictal patterns:  1 - Seconds of diffuse electrodecrement then paroxysmal fast activity followed by 2Hz spike wave discharges (clinically accompanied by posturing of the arms, then clonic jerking)     Continuous video EEG monitoring 10/18 - 10/25/2017  Diffuse background slowing with bursts of arrhythmic generalized spike wave discharges, with shifting lateralization, and independent left and right temporal spikes  Mild eyelid myoclonia associated with brief bursts of 2-2.5 Hz generalized discharges  Tonic seizures with eelctrodecrement  There were innumerable tonic seizures; however by 10/25/2017 the frequency of these seizures did decrease in frequency. Continuous video EEG monitoring 12/1-12/5/2017  There are innumerable tonic seizures that are generally less than one minute in duration (30-40 seconds), these consists of subtle eye opening and tonic stiffening of arms, if longer then whole body stiffening with clonic jerking movements. These almost always occur exclusively out of sleep. These consist of 2-2.5 Hz generalized spike-slow wave complexes with generalized attenuation of activity (desynchronization) or paroxysmal fast activity. Per 24 hours count of seizures could be .     12/19/2018 routine study  Frequent recurrent tonic seizures associated with paroxysmal generalized fast activity then generalized rhythmic discharges associated with eyes upward deviation, and myoclonic/clonic jerking of the upper body, excess beta activity.      6/28-7/3/2019 continuous video EEG monitoring  Frequent clusters of tonic seizures (body rigidity, head flexions, eyes go up with dysconjugate gaze, and clonic activity of the arms for a few seconds), these are associated with GPFA and rhythmic spike-slow waves. There are also bilateral temporal focal epileptiform discharges. Labs:  2/3/2023  CBC 11/13/42/165  /4.3/107/19/16/0.4/128  LFT 7.0/4.1/0.2/30/25/107  Topiramate 10.9  Lacosamide 6.0    The following portions of the patient's history were reviewed and updated as appropriate: current medications, past family history, past medical history, past social history, past surgical history and problem list.         Objective:    Blood pressure 102/59, pulse 80, temperature 97.5 °F (36.4 °C), temperature source Temporal, resp. rate 16, height 5' (1.524 m), weight 43.2 kg (95 lb 3.2 oz). Physical Exam  Constitutional:       Comments: Patient awake, lying on stretcher, frequently screaming/crying out, also laughing, does not appear in any acute distress   Eyes:      Extraocular Movements: No nystagmus. Pupils: Pupils are equal, round, and reactive to light. Neurological Exam  Mental Status    Unable to assess orientation, attention/concentration, memory due to intellectual disability  She is frequently verbalizing but nonsensical speech . Cranial Nerves  CN III, IV, VI: Tracks around the room, unable to follow instructions for formal testing . No nystagmus. Pupils equal round and reactive to light bilaterally. CN VII: Muscles of facial expression are symmetric . Motor    Low tone, decreased muscle bulk. Patient moving all 4 limbs, frequently flexing and extending her legs at the knees. She is holding a toy in the left hand and waving the toy around, seems to move both arms equally  Unable to formally test her strength   . Sensory  Unable to assess. Reflexes  Not assessed. Coordination    Unable to assess. Gait    Not assessed--came on a stretcher.       VNS Interrogation Only   Model:   S/N: [id number]  Date of implant: 12/18/2019     Battery indicator 25-50%     Current settings:  Output Current 2.0 mAmp Signal Frequency 20 Hz   Pulse Width 250 microsec   Signal On Time 30 sec   Signal Off Time 1.1 min      AutoStimulation settings:  AutoStim Output 2.0 mAmp   Pulse Width 250 msec   AutoStim On Time 30 sec      Magnet settings:  Mag Output 2.25 mAmp   Pulse Width 500 microsec   Mag On Time 60 sec      Current Outpatient Medications   Medication Sig Dispense Refill   • acetaminophen (TYLENOL) 325 mg tablet Take 650 mg by mouth every 6 (six) hours as needed for mild pain or fever     • Brivaracetam (Briviact) 10 MG/ML SOLN oral solution 5 mL (50 mg total) by Per PEG Tube route every 12 (twelve) hours 300 mL 5   • cannabidiol (Epidiolex) 100 mg/ml SOLN 4 mL (400 mg total) by Per G Tube route 2 (two) times a day 240 mL 5   • cloBAZam (ONFI) oral suspension 4 mL (10 mg total) by Per PEG Tube route daily at bedtime 120 mL 5   • diazepam (VALIUM) 5 MG/ML solution If the patient has a seizure lasting more than 3 minutes then give 1mL by PEG tube, may repeat 1mL if she continues to have seizure for more than 10 minutes. 30 mL 1   • lacosamide (Vimpat) 10 mg/mL 20 mL (200 mg total) by Per G Tube route every 12 (twelve) hours 1200 mL 5   • Probiotic Product (ALEXANDER-BID PROBIOTIC PO) 1 tablet by Per G Tube route daily       • rufinamide (BANZEL) 400 mg tablet 4 tablets (1,600 mg total) by Per G Tube route every 12 (twelve) hours 240 tablet 5   • topiramate (TOPAMAX) 50 MG tablet 5 tablets (250 mg total) by Per G Tube route every 12 (twelve) hours 300 tablet 5   • VITAMIN D PO Take 1,000 mg by mouth in the morning Given in her G-tube       No current facility-administered medications for this visit. ROS:    Review of Systems   Constitutional: Negative for chills and fever. HENT: Negative for ear pain and sore throat. Eyes: Negative for pain and visual disturbance. Respiratory: Negative for cough and shortness of breath. Cardiovascular: Negative for chest pain and palpitations.    Gastrointestinal: Negative for abdominal pain and vomiting. Genitourinary: Negative for dysuria and hematuria. Musculoskeletal: Negative for arthralgias and back pain. Skin: Negative for color change and rash. Neurological: Positive for seizures (7/10/2023). Negative for syncope. All other systems reviewed and are negative.     I personally reviewed and updated the ROS as appropriate

## 2023-08-17 DIAGNOSIS — G40.812 LENNOX-GASTAUT SYNDROME WITH TONIC SEIZURES (HCC): ICD-10-CM

## 2023-08-18 RX ORDER — CANNABIDIOL 100 MG/ML
SOLUTION ORAL
Qty: 100 ML | Refills: 5 | Status: SHIPPED | OUTPATIENT
Start: 2023-08-18

## 2023-08-23 ENCOUNTER — TELEPHONE (OUTPATIENT)
Dept: NEUROLOGY | Facility: CLINIC | Age: 43
End: 2023-08-23

## 2023-08-23 DIAGNOSIS — G40.814 INTRACTABLE LENNOX-GASTAUT SYNDROME WITHOUT STATUS EPILEPTICUS (HCC): Primary | ICD-10-CM

## 2023-08-23 NOTE — TELEPHONE ENCOUNTER
Hi, my name is Celia Boyd. 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 I am calling in regards to REBECCA JONES [facility], date of birth [birth year]. I was just calling on her medications that she has ordered from Dr. Sasha Bhardwaj. If someone could please give me a call back here. The number is [phone]. Thank you. Called [phone], No answer. Continuous ringing.  No option to leave a message

## 2023-08-28 RX ORDER — LACOSAMIDE 200 MG/1
TABLET ORAL
Qty: 60 TABLET | Refills: 5 | Status: SHIPPED | OUTPATIENT
Start: 2023-08-28

## 2023-08-28 RX ORDER — CLOBAZAM 10 MG/1
TABLET ORAL
Qty: 30 TABLET | Refills: 5 | Status: SHIPPED | OUTPATIENT
Start: 2023-08-28

## 2023-08-28 NOTE — TELEPHONE ENCOUNTER
Returned call to facility. [phone]. Nursing is noting they are having a hard time dosing and keeping track of liquid medications. Asking if clobazam, lacosamide and briviact could be dosed in pill form and crushed for administration. [facility].

## 2023-08-28 NOTE — TELEPHONE ENCOUNTER
We can change to tablets and have them crushed and given through her PEG, but just have to let them know that some of the meds say "do not cut/crush/chew" because they have not been studied that way. I sent tablet forms of clobazam, Briviact and lacosamide to [facility]. Discontinue liquids forms of these meds.

## 2023-08-31 NOTE — TELEPHONE ENCOUNTER
Spoke to facility. Requesting documentation faxed to them regarding change. Faxed as requested to: [phone].

## 2023-09-05 ENCOUNTER — TELEPHONE (OUTPATIENT)
Dept: NEUROLOGY | Facility: CLINIC | Age: 43
End: 2023-09-05

## 2023-09-05 DIAGNOSIS — G40.812 LENNOX-GASTAUT SYNDROME WITH TONIC SEIZURES (HCC): ICD-10-CM

## 2023-09-05 NOTE — TELEPHONE ENCOUNTER
received vm from 9/1 at 2:21pm- hi, this is bryan at [employer]. I'm a pharmacist calling about patient naila oakes, date of birth, 1980. We received a prescription from a prescriber Kennedy Mendoza for epidiolex on 8/18/23. epidiolex 100mg per ml, give 4ml via g tube. 2 times a day. Quantity that the doctor put in was only a 100 mls, which would be roughly a 12.5 day supply with 5 refills. So we were clarifying, wanting to clarify the quantity to see if the doctor would be okay with the 240 mls. Which would be a 30 day supply. If someone could please give us a call back. Our callback number is [phone]. Thank you.  --------------------------------------  Script entered for updated qty of 240mls.   Please sign off if agreeable

## 2023-09-05 NOTE — TELEPHONE ENCOUNTER
Recd vm 9/1 taken off 9/5   this is rasheeda calling from [address]. I am calling in regards to naila oakes,  YOB: 1980. a script was faxed over from your office to the [facility], [facility] pharmacy for her epidiolex. The order is only placed for 100 milligrams and they're unable to fill the order because the order that they have on hand is for 240 ml. And so they were looking to reach out and get clarification on that order. Right now she is out of her medication. So I was looking to see if there was any other orders you'd like in place or to put this one on hold. And just give us a call back here at [phone]. Last visit 8/16, Dr. Darryl Cox, office note documents:  1 - Continue Epidiolex 100mg/mL 4.0 mL (400mg) every 12 hours (Goes to [facility] specialty pharmacy)    I spoke to [employer] at [place]. She clarified epidiolex comes in 60 ml bottles; In order to receive a month supply, asking pharmacy to dispense four 60 ml bottles (total of 240 ml (4 bottles) for a month supply    Script written:"Dispense Quantity: 100 mL"           Sig: GIVE 4 ML VIA GTUBE 2 TIMES A DAY. I attempted to call [facility] to clarify quantity as only written for "100 ml"; was not connecting call; may not be open yet, please call pharmacy to clarify. Thank you.

## 2023-09-05 NOTE — TELEPHONE ENCOUNTER
Called [facility]. Spoke w/Jeannine - pharmacist. Per chart review, new rx for cannabidiol 100mg/ml soln  Qty - 240ml sent this AM.     Rx for Epiodiolex 100mg/ml  Sig: give 4 ml via g-tube BID has been canceled. Jeannine-pharmacist confirmed above.

## 2023-10-13 NOTE — NUTRITION
12/04/17 2003   Recommendations/Interventions   Interventions EN advance to goal   Nutrition Recommendations Tube Feeding Recommendation provided;Lab - consider order (specify)  (monitor ammonia levels; Suggest continue to gradually increase TF to goal 50ml/hr with 100ml freeH20 Q4hr(1440kcal with 67gm pro   If ammonia level continues to increase may need switch to Jevity 1 0 to goal rate of 57ml/jk=3594tyiw /61gms pro sameFlush) Hpi Title: Evaluation of Skin Lesions 4 = No assist / stand by assistance

## 2023-10-22 NOTE — DISCHARGE INSTRUCTIONS
Epilepsy   WHAT YOU NEED TO KNOW:   Epilepsy is a brain disorder that causes seizures  It is also called a seizure disorder  A seizure means an abnormal area in your brain sometimes sends bursts of electrical activity  A seizure may start in one part of your brain, or both sides may be affected  Depending on the type of seizure, you may have movements you cannot control, lose consciousness, or stare straight ahead  You may be confused or tired after the seizure  A seizure may last a few seconds or longer than 5 minutes  A birth defect, tumor, stroke, dementia, injury, or infection may cause epilepsy  The cause of your epilepsy may not be known  If your seizures are not controlled, epilepsy may become life-threatening  DISCHARGE INSTRUCTIONS:   Call 911 for any of the following:   · Your seizure lasts longer than 5 minutes  · You have trouble breathing after a seizure  · You have diabetes or are pregnant and have a seizure  · You have a seizure in water, such as a swimming pool or bathtub  Return to the emergency department if:   · You have a second seizure within 24 hours of the first      · You are injured during a seizure  Contact your healthcare provider if:   · You feel you are not able to cope with your condition  · Your seizures start to happen more often  · You are confused longer than usual after a seizure  · You are planning to get pregnant or are currently pregnant  · You have questions or concerns about your condition or care  Medicines:   · Antiseizure medicine  may control or prevent another seizure  Do not stop taking this medicine  Another person may need to give you rescue medicine to stop a seizure at home  Ask your healthcare provider for more information about rescue medicine  · Take your medicine as directed  Contact your healthcare provider if you think your medicine is not helping or if you have side effects  Tell him of her if you are allergic to any medicine  Refill Request     CONFIRM preferred pharmacy with the patient. If Mail Order Rx - Pend for 90 day refill. Last Seen: Last Seen Department: 8/21/2023  Last Seen by PCP: 8/21/2023    Last Written: 3/17/2023 180 tab 1 refills    If no future appointment scheduled:  Review the last OV with PCP and review information for follow-up visit,  Route STAFF MESSAGE with patient name to the [facility] for scheduling with the following information:            -  Timing of next visit           -  Visit type ie Physical, OV, etc           -  Diagnoses/Reason ie. COPD, HTN - Do not use MEDICATION, Follow-up or CHECK UP - Give reason for visit      Next Appointment:   Future Appointments   Date Time Provider 4600  [facility]   2/22/2024  4:00 PM Goldy Tran DO [facility] [employer] [facility]       Message sent to [address] [place] [address] to schedule appt with patient? NO      Requested Prescriptions     Pending Prescriptions Disp Refills    BREO ELLIPTA 200-25 MCG/ACT AEPB inhaler [Pharmacy Med Name: Navya Benitez 200-25 MCG/ACT Aerosol Powder Breath Activated]  10     Sig: INHALE 1 PUFF INTO THE LUNGS IN THE MORNING. Keep a list of the medicines, vitamins, and herbs you take  Include the amounts, and when and why you take them  Bring the list or the pill bottles to follow-up visits  Carry your medicine list with you in case of an emergency  Follow up with your neurologist as directed: You may need tests to check the level of antiseizure medicine in your blood  Your neurologist may need to change or adjust your medicine  Write down your questions so you remember to ask them during your visits  What you can do to prevent a seizure: You may not be able to prevent every seizure  The following can help you manage triggers that may make a seizure start:  · Take your medicine every day at the same time  This will also help prevent medicine side effects  Set an alarm to help remind you to take your medicine every day  · Manage stress  Stress can be a trigger for epilepsy  Exercise can help you reduce stress  Talk to your healthcare provider about exercise that is safe for you  Illness can be a form of stress  Eat a variety of healthy foods and drink plenty of liquids during an illness  Talk to your healthcare provider about other ways to manage stress  · Set a regular sleep schedule  A lack of sleep can trigger a seizure  Try to go to sleep and wake up at the same time every day  Keep your bedroom quiet and dark  Talk to your healthcare provider if you are having trouble sleeping  · Limit or do not drink alcohol as directed  Alcohol can trigger a seizure, especially if you drink a large amount at one time  A drink of alcohol is 12 ounces of beer, 1½ ounces of liquor, or 5 ounces of wine  Talk to your healthcare provider about a safe amount of alcohol for you  Your provider may recommend that you do not drink any alcohol  Tell him or her if you need help to quit drinking  What you can do to manage epilepsy:   · Keep a seizure diary  This can help you find your triggers and avoid them   Write down the dates of your seizures, where you were, and what you were doing  Include how you felt before and after  Possible triggers include illness, lack of sleep, hormonal changes, alcohol, drugs, lights, or stress  · Record any auras you have before a seizure  An aura is a sign that you are about to have a seizure  Auras happen before certain types of seizures that are in only 1 part of the brain  The aura may happen seconds before a seizure, or up to an hour before  You may feel, see, hear, or smell something  Examples include part of your body becoming hot  You may see a flash of light or hear something  You may have anxiety or déjà vu  If you have an aura, include it in your seizure diary  · Create a care plan  Tell family, friends, and coworkers about your epilepsy  Give them instructions that tell them how they can keep you safe if you have a seizure  · Find support  You may be referred to a psychologist or   Ask your healthcare provider about support groups for people with epilepsy  · Ask what safety precautions you should take  Talk with your healthcare provider about driving  You may not be able to drive until you are seizure-free for a period of time  You will need to check the law where you live  Also talk to your healthcare provider about swimming and bathing  You may drown or develop life-threatening heart or lung damage if you have a seizure in water  · Carry medical alert identification  Wear medical alert jewelry or carry a card that says you have epilepsy  Ask your healthcare provider where to get these items  How others can keep you safe during a seizure:  Give the following instructions to family, friends, and coworkers:  · Do not panic  · Do not hold me down or put anything in my mouth  · Gently guide me to the floor or a soft surface  · Place me on my side to help prevent me from swallowing saliva or vomit  · Protect me from injury   Remove sharp or hard objects from the area surrounding me, or cushion my head  · Loosen the clothing around my head and neck  · Time how long my seizure lasts  Call 911 if my seizure lasts longer than 5 minutes or if I have a second seizure  · Stay with me until my seizure ends  Let me rest until I am fully awake  · Perform CPR if I stop breathing or you cannot feel my pulse  · Do not give me anything to eat or drink until I am fully awake  © 2017 [address] [facility] Information is for End User's use only and may not be sold, redistributed or otherwise used for commercial purposes  All illustrations and images included in CareNotes® are the copyrighted property of A D A M , Inc  or Jason Chiu  The above information is an  only  It is not intended as medical advice for individual conditions or treatments  Talk to your doctor, nurse or pharmacist before following any medical regimen to see if it is safe and effective for you

## 2023-11-15 ENCOUNTER — TELEPHONE (OUTPATIENT)
Dept: NEUROLOGY | Facility: CLINIC | Age: 43
End: 2023-11-15

## 2023-11-15 NOTE — TELEPHONE ENCOUNTER
[facility] from the [facility] called to r/s pts appt for tomorrow 11/16 @ 11:30 with Dr. Mariaa Bae due to pt having covid. New appt made for 1/25/24 @ 2:30 with Dr. Mariaa Bae in [place].

## 2023-11-21 NOTE — CASE MANAGEMENT
Continued Stay Review    Date: 8/17 Transfer from [facility]4Th Floor ICU Unit    08/18/18 0019  Inpatient Admission Once     Transfer Service: General Medicine       Question Answer Comment   Admitting Physician Meme Kirk    Level of Care Med Surg    Estimated length of stay More than 2 Midnights    Certification I certify that inpatient services are medically necessary for this patient for a duration of greater than two midnights  See H&P and MD Progress Notes for additional information about the patient's course of treatment  Vital Signs: BP (!) 78/57 (BP Location: Left arm)   Pulse (!) 53   Temp 97 5 °F (36 4 °C) (Axillary)   Resp 16   Ht 4' 9" (1 448 m)   Wt 41 5 kg (91 lb 7 9 oz)   LMP  (LMP Unknown)   SpO2 96%   BMI 19 80 kg/m²     Medications:   Scheduled Meds:   Current Facility-Administered Medications:  artificial tear 1 application Ophthalmic TID   enoxaparin 40 mg Subcutaneous Daily   lactulose 30 g Per G Tube TID   levETIRAcetam 1,000 mg Per G Tube Q12H [address]   levOCARNitine 750 mg Per G Tube TID   Perampanel 8 mg Per G Tube HS   PHENobarbital 20 mg Oral HS   potassium chloride 20 mEq Per G Tube Daily   rufinamide 1,200 mg Per G Tube BID   saccharomyces boulardii 250 mg Per NG Tube BID   senna-docusate sodium 1 tablet Per G Tube Daily   topiramate 200 mg Oral Q12H NANDO   valproic acid 500 mg Per G Tube Q8H     Continuous Infusions:    PRN Meds:   acetaminophen    bisacodyl    magnesium hydroxide    ondansetron    Abnormal Labs/Diagnostic Results:   Alkaline Phosphatase 46 - 116 U/L 132     AST 5 - 45 U/L 127     ALT 12 - 78 U/L 186         Age/Sex: 41 y o  female     Assessment:  Altered mental status  Hepatic encephalopathy  Recent hypercapnia     Plan:  Admit for further evaluation of mental status change likely multifactorial including acute hepatic encephalopathy and recent hypercapnia/suspected sepsis    Commence on lactulose 30 cc q 8 hours hourly maintain 2-3 loose bowels today  Consult GI service for evaluation  Initiate fall precautions and provide one-to-one monitoring as needed  Has history of seizures but doubt this is due to status epilepticus  Neurology consult awaited and possible video EEG monitoring      Recent hypercapnia  Monitor respiratory status and request blood gas measurements of necessary  BiPAP for respiratory support as needed  Pulmonary consult if necessary      Other comorbidities  Seizure precautions  Routine medications and nasogastric tube feeding and supportive care  20-year-old  female with Lennox-Gastaut syndrome, seizure disorder, ADHD, anoxic brain injury a recent diagnosis of respiratory failure with hypercapnia presenting from [facility] for evaluation of lethargy and mental status change  Compared to baseline  No current fever but with concerns for possible unremitting seizures  Currently admitted to the neurology floor to consider continuous EEG video monitoring  Patient is nonverbal requires one-to-one supervision with since a gastric feeding tube  Lab showed elevated transaminases ammonia level of 71  WBC 8 19 with normal differentials  Urinalysis negative  Arterial blood gas from August 16, 2018 shows pH of 7 36 pCO2 47  P02 85 bicarbonate 26  Syble Basil PCO2 2 days before was 55      Discharge Plan: 15d MA bedliset at [facility]   Return when medically cleared Lab: [id number] Lab: [id number]

## 2023-12-05 ENCOUNTER — TELEPHONE (OUTPATIENT)
Dept: NEUROLOGY | Facility: CLINIC | Age: 43
End: 2023-12-05

## 2023-12-05 NOTE — TELEPHONE ENCOUNTER
12/4 3:15    Leno aMguire from [facility] left a VM re: seizures and rescheduling appt. Transcribed VM:   Hi, my name is Leno Maguire. I'm calling from the [address] [place] [address] and I am calling in regards to one of our residents. Her name is DENNIS HAYES. Date of birth [birth year]. And I am calling to report that Arabella Stuart was having some seizures yesterday. The 1st one lasted about 5 minutes, and then she had several more after that. They gave her the Diazepam and she continued intermittently for an additional 5 minutes, lasting like 10 seconds each for each seizure. But I did want to call and report that, and also I believe she had an appointment a week or 2 ago and she had missed that appointment because she had COVID at that time. So I'm going to call back with the  and see if I can get her rescheduled. But if there's any further orders for her, any blood work or anything which you'd like to order as far as her medication drug levels, please call back and let me know. My number again [phone]. Thank you. Pt already rescheduled for 1/25/24.

## 2023-12-06 ENCOUNTER — OFFICE VISIT (OUTPATIENT)
Dept: GASTROENTEROLOGY | Facility: MEDICAL CENTER | Age: 43
End: 2023-12-06
Payer: MEDICARE

## 2023-12-06 VITALS
WEIGHT: 99 LBS | SYSTOLIC BLOOD PRESSURE: 110 MMHG | HEIGHT: 61 IN | TEMPERATURE: 98.5 F | HEART RATE: 75 BPM | BODY MASS INDEX: 18.69 KG/M2 | DIASTOLIC BLOOD PRESSURE: 62 MMHG

## 2023-12-06 DIAGNOSIS — G40.814 INTRACTABLE LENNOX-GASTAUT SYNDROME WITHOUT STATUS EPILEPTICUS (HCC): ICD-10-CM

## 2023-12-06 DIAGNOSIS — Z93.1 FEEDING BY G-TUBE (HCC): Primary | ICD-10-CM

## 2023-12-06 DIAGNOSIS — G93.1 CEREBRAL ANOXIC INJURY (HCC): ICD-10-CM

## 2023-12-06 PROCEDURE — 99203 OFFICE O/P NEW LOW 30 MIN: CPT | Performed by: STUDENT IN AN ORGANIZED HEALTH CARE EDUCATION/TRAINING PROGRAM

## 2023-12-06 NOTE — PROGRESS NOTES
[facility] - Outpatient Consultation  Walker Rose 44 y.o. female MRN: [id number]  Encounter: [id number]      Assessment and Plan:    1. Feeding by G-tube ([address])    2. 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 Cerebral anoxic injury ([address])        44 y.o. female nursing home resident w/ hx of Lennox-Gastaut syndrome with seizures, intellectual disability, autism, anoxic brain injury, non-verbal status who presents for "G-tube." The reason for today's visit is a bit nebulous, as there does not seem to be any problem with the patient's G-tube. It was recently replaced in 3/2023, and there is no indication for routine replacement at this time. Reassurance given. PRN follow-up    No orders of the defined types were placed in this encounter.    ______________________________________________________________________    History of Present Illness:    Walker Rose is a 44 y.o. female nursing home resident w/ hx of Lennox-Gastaut syndrome with seizures, intellectual disability, autism, anoxic brain injury, non-verbal status who presents for "G-tube."    Patient is non-verbal and dependent on G-tube for nutrition. Aide who accompanies the patient is not sure what the reason is for today's appointment. There is nothing documented in the chart except for "G-tube."    We called the nursing facility, and there does not seem to be any issue with G-tube function. Patient is successfully getting feeds. The nurse wondered if patient was here for a routine G-tube change. Review of the chart reveals that patient had a dislodged G-tube in 3/2023 and had it replaced in the ER with a 16 Fr G-tube. Review of Systems:  As per HPI. Otherwise negative.       Historical Information   Past Medical History:   Diagnosis Date    ADHD     Anoxic brain damage ([address])     Autistic disorder     Breakthrough seizure ([address]) 8/1/2019    Dysphagia     Dysphagia, oropharyngeal phase Gastrostomy tube dependent (HCC)     14 Fr as of 05/19/2020    Hyperammonemia (HCC)     Hyperkeratosis     Hypotension     Intellectual disability     Lennox-Gastaut syndrome with tonic seizures (HCC)     Lethargy     Liver enzyme elevation     Onychomycosis     Osteoporosis     Osteoporosis      Past Surgical History:   Procedure Laterality Date    ABDOMINAL SURGERY      CARDIAC PACEMAKER PLACEMENT      vns implant l chest    IR GASTROSTOMY TUBE PLACEMENT  11/21/2019    IR THORACENTESIS  12/17/2018    JEJUNOSTOMY FEEDING TUBE      history of - most recently, PEG tube    PEG TUBE PLACEMENT      DC INSJ/RPLCMT CRANIAL NEUROSTIM PULSE GENERATOR Left 12/18/2019    Procedure: REPLACEMENT IMPLANTABLE PULSE GENERATOR FOR VAGAL NERVE STIMULATOR, LEFT CHEST;  Surgeon: Ismael Infante MD;  Location: [facility];  Service: Neurosurgery     Social History   Social History     Substance and Sexual Activity   Alcohol Use Never     Social History     Substance and Sexual Activity   Drug Use Never     Social History     Tobacco Use   Smoking Status Never   Smokeless Tobacco Never     History reviewed. No pertinent family history. Meds/Allergies       Current Outpatient Medications:     acetaminophen (TYLENOL) 325 mg tablet    brivaracetam (BRIVIACT) 50 MG TABS tablet    cannabidiol (Epidiolex) 100 mg/ml SOLN    cloBAZam (ONFI) 10 MG tablet    diazepam (VALIUM) 5 MG/ML solution    lacosamide (VIMPAT) 200 mg tablet    Probiotic Product (ALEXANDER-BID PROBIOTIC PO)    rufinamide (BANZEL) 400 mg tablet    topiramate (TOPAMAX) 50 MG tablet    VITAMIN D PO    Allergies   Allergen Reactions    Felbamate            Objective     Blood pressure 110/62, pulse 75, temperature 98.5 °F (36.9 °C), temperature source Tympanic, height 5' 1" (1.549 m), weight 44.9 kg (99 lb). Body mass index is 18.71 kg/m².         Physical Exam:      General: No acute distress  Abdomen: Soft, non-tender, non-distended, normoactive bowel sounds, G-tube site with minimal discharge but no erythema, tenderness, or induration. Easily slides in and out.   Neuro: Awake, non-verbal, unable to follow commands or communicate    Lab Results:   Lab Results   Component Value Date/Time    WBC 8.12 08/17/2021 05:45 AM    WBC 13.78 (H) 04/01/2015 09:35 PM    HGB 11.6 08/17/2021 05:45 AM    HGB 12.7 04/01/2015 09:35 PM     08/17/2021 05:45 AM     04/01/2015 09:35 PM    SODIUM 137 08/17/2021 05:45 AM    K 3.6 08/17/2021 05:45 AM    K 3.4 (L) 04/01/2015 09:35 PM     08/17/2021 05:45 AM     04/01/2015 09:35 PM    CO2 20 (L) 08/17/2021 05:45 AM    CO2 19 (L) 12/14/2018 12:44 PM    BUN 23 08/17/2021 05:45 AM    BUN 7 04/01/2015 09:35 PM    CREATININE 0.41 (L) 08/17/2021 05:45 AM    CREATININE 0.36 (L) 04/01/2015 09:35 PM    AST 26 08/17/2021 05:45 AM    AST 15 04/01/2015 09:35 PM    ALT 31 08/17/2021 05:45 AM    ALT 27 04/01/2015 09:35 PM    ALKPHOS 103 08/17/2021 05:45 AM    ALKPHOS 95 04/01/2015 09:35 PM    TBILI 0.48 08/17/2021 05:45 AM    BILIDIR 0.09 06/30/2019 05:42 AM    ALB 3.1 (L) 08/17/2021 05:45 AM    ALB 3.2 (L) 04/01/2015 09:35 PM    INR 1.04 07/31/2019 11:01 PM    LIPASE 410 (H) 07/26/2019 09:06 PM    LIPASE 229 10/31/2014 12:45 AM    FERRITIN 95 08/18/2018 05:07 AM    CONCFE 33 08/18/2018 05:07 AM    TIBC 221 (L) 08/18/2018 05:07 AM

## 2023-12-07 ENCOUNTER — HOSPITAL ENCOUNTER (INPATIENT)
Facility: HOSPITAL | Age: 43
LOS: 2 days | Discharge: NURSING, REHAB OR LONG-TERM CARE | DRG: 394 | End: 2023-12-10
Attending: EMERGENCY MEDICINE | Admitting: FAMILY MEDICINE
Payer: MEDICARE

## 2023-12-07 DIAGNOSIS — F84.0 AUTISM: ICD-10-CM

## 2023-12-07 DIAGNOSIS — G40.812 LENNOX-GASTAUT SYNDROME (HCC): ICD-10-CM

## 2023-12-07 DIAGNOSIS — G40.909 RECURRENT SEIZURES (HCC): ICD-10-CM

## 2023-12-07 DIAGNOSIS — T85.528A DISLODGED GASTROSTOMY TUBE: Primary | ICD-10-CM

## 2023-12-07 PROCEDURE — 99283 EMERGENCY DEPT VISIT LOW MDM: CPT

## 2023-12-07 PROCEDURE — 99285 EMERGENCY DEPT VISIT HI MDM: CPT | Performed by: EMERGENCY MEDICINE

## 2023-12-07 NOTE — Clinical Note
Case was discussed with Frantz Santillan PA-C  and the patient's admission status was agreed to be Admission Status: observation status to the service of Dr. Stephen Delarosa .

## 2023-12-08 ENCOUNTER — APPOINTMENT (INPATIENT)
Dept: INTERVENTIONAL RADIOLOGY/VASCULAR | Facility: HOSPITAL | Age: 43
DRG: 394 | End: 2023-12-08
Attending: RADIOLOGY
Payer: MEDICARE

## 2023-12-08 ENCOUNTER — APPOINTMENT (OUTPATIENT)
Dept: NEUROLOGY | Facility: HOSPITAL | Age: 43
DRG: 394 | End: 2023-12-08
Attending: FAMILY MEDICINE
Payer: MEDICARE

## 2023-12-08 LAB
ALBUMIN SERPL BCP-MCNC: 4.5 G/DL (ref 3.5–5)
ALP SERPL-CCNC: 99 U/L (ref 34–104)
ALT SERPL W P-5'-P-CCNC: 23 U/L (ref 7–52)
ANION GAP SERPL CALCULATED.3IONS-SCNC: 18 MMOL/L
AST SERPL W P-5'-P-CCNC: 21 U/L (ref 13–39)
BASOPHILS # BLD AUTO: 0.05 THOUSANDS/ÂΜL (ref 0–0.1)
BASOPHILS NFR BLD AUTO: 1 % (ref 0–1)
BILIRUB SERPL-MCNC: 0.56 MG/DL (ref 0.2–1)
BUN SERPL-MCNC: 20 MG/DL (ref 5–25)
CALCIUM SERPL-MCNC: 9.6 MG/DL (ref 8.4–10.2)
CHLORIDE SERPL-SCNC: 108 MMOL/L (ref 96–108)
CO2 SERPL-SCNC: 12 MMOL/L (ref 21–32)
CREAT SERPL-MCNC: 0.62 MG/DL (ref 0.6–1.3)
EOSINOPHIL # BLD AUTO: 0.03 THOUSAND/ÂΜL (ref 0–0.61)
EOSINOPHIL NFR BLD AUTO: 0 % (ref 0–6)
ERYTHROCYTE [DISTWIDTH] IN BLOOD BY AUTOMATED COUNT: 13 % (ref 11.6–15.1)
GFR SERPL CREATININE-BSD FRML MDRD: 111 ML/MIN/1.73SQ M
GLUCOSE SERPL-MCNC: 222 MG/DL (ref 65–140)
HCT VFR BLD AUTO: 42 % (ref 34.8–46.1)
HGB BLD-MCNC: 13.2 G/DL (ref 11.5–15.4)
IMM GRANULOCYTES # BLD AUTO: 0.02 THOUSAND/UL (ref 0–0.2)
IMM GRANULOCYTES NFR BLD AUTO: 0 % (ref 0–2)
LYMPHOCYTES # BLD AUTO: 1.14 THOUSANDS/ÂΜL (ref 0.6–4.47)
LYMPHOCYTES NFR BLD AUTO: 11 % (ref 14–44)
MAGNESIUM SERPL-MCNC: 1.8 MG/DL (ref 1.9–2.7)
MCH RBC QN AUTO: 31.9 PG (ref 26.8–34.3)
MCHC RBC AUTO-ENTMCNC: 31.4 G/DL (ref 31.4–37.4)
MCV RBC AUTO: 101 FL (ref 82–98)
MONOCYTES # BLD AUTO: 0.72 THOUSAND/ÂΜL (ref 0.17–1.22)
MONOCYTES NFR BLD AUTO: 7 % (ref 4–12)
NEUTROPHILS # BLD AUTO: 8.21 THOUSANDS/ÂΜL (ref 1.85–7.62)
NEUTS SEG NFR BLD AUTO: 81 % (ref 43–75)
NRBC BLD AUTO-RTO: 0 /100 WBCS
PHOSPHATE SERPL-MCNC: 3.3 MG/DL (ref 2.7–4.5)
PLATELET # BLD AUTO: 225 THOUSANDS/UL (ref 149–390)
PMV BLD AUTO: 11.7 FL (ref 8.9–12.7)
POTASSIUM SERPL-SCNC: 3.3 MMOL/L (ref 3.5–5.3)
PROT SERPL-MCNC: 7.8 G/DL (ref 6.4–8.4)
RBC # BLD AUTO: 4.14 MILLION/UL (ref 3.81–5.12)
SODIUM SERPL-SCNC: 138 MMOL/L (ref 135–147)
WBC # BLD AUTO: 10.17 THOUSAND/UL (ref 4.31–10.16)

## 2023-12-08 PROCEDURE — C1769 GUIDE WIRE: HCPCS

## 2023-12-08 PROCEDURE — NC001 PR NO CHARGE: Performed by: RADIOLOGY

## 2023-12-08 PROCEDURE — C1894 INTRO/SHEATH, NON-LASER: HCPCS

## 2023-12-08 PROCEDURE — 0DHA3UZ INSERTION OF FEEDING DEVICE INTO JEJUNUM, PERCUTANEOUS APPROACH: ICD-10-PCS | Performed by: RADIOLOGY

## 2023-12-08 PROCEDURE — 95819 EEG AWAKE AND ASLEEP: CPT | Performed by: PSYCHIATRY & NEUROLOGY

## 2023-12-08 PROCEDURE — 95816 EEG AWAKE AND DROWSY: CPT

## 2023-12-08 PROCEDURE — 99222 1ST HOSP IP/OBS MODERATE 55: CPT | Performed by: FAMILY MEDICINE

## 2023-12-08 PROCEDURE — 36415 COLL VENOUS BLD VENIPUNCTURE: CPT | Performed by: PHYSICIAN ASSISTANT

## 2023-12-08 PROCEDURE — 99214 OFFICE O/P EST MOD 30 MIN: CPT | Performed by: SURGERY

## 2023-12-08 PROCEDURE — 83735 ASSAY OF MAGNESIUM: CPT | Performed by: PHYSICIAN ASSISTANT

## 2023-12-08 PROCEDURE — 85025 COMPLETE CBC W/AUTO DIFF WBC: CPT | Performed by: PHYSICIAN ASSISTANT

## 2023-12-08 PROCEDURE — 84100 ASSAY OF PHOSPHORUS: CPT | Performed by: PHYSICIAN ASSISTANT

## 2023-12-08 PROCEDURE — 99204 OFFICE O/P NEW MOD 45 MIN: CPT | Performed by: SURGERY

## 2023-12-08 PROCEDURE — 49440 PLACE GASTROSTOMY TUBE PERC: CPT

## 2023-12-08 PROCEDURE — 80053 COMPREHEN METABOLIC PANEL: CPT | Performed by: PHYSICIAN ASSISTANT

## 2023-12-08 PROCEDURE — C9254 INJECTION, LACOSAMIDE: HCPCS | Performed by: FAMILY MEDICINE

## 2023-12-08 PROCEDURE — 87081 CULTURE SCREEN ONLY: CPT | Performed by: FAMILY MEDICINE

## 2023-12-08 RX ORDER — RUFINAMIDE 40 MG/ML
1600 SUSPENSION ORAL EVERY 12 HOURS SCHEDULED
Status: DISCONTINUED | OUTPATIENT
Start: 2023-12-09 | End: 2023-12-10 | Stop reason: HOSPADM

## 2023-12-08 RX ORDER — LORAZEPAM 2 MG/ML
1 INJECTION INTRAMUSCULAR ONCE
Status: DISCONTINUED | OUTPATIENT
Start: 2023-12-08 | End: 2023-12-08

## 2023-12-08 RX ORDER — POTASSIUM CHLORIDE 14.9 MG/ML
20 INJECTION INTRAVENOUS ONCE
Status: COMPLETED | OUTPATIENT
Start: 2023-12-08 | End: 2023-12-08

## 2023-12-08 RX ORDER — CLOBAZAM 10 MG/1
10 TABLET ORAL
Status: DISCONTINUED | OUTPATIENT
Start: 2023-12-08 | End: 2023-12-10 | Stop reason: HOSPADM

## 2023-12-08 RX ORDER — LORAZEPAM 2 MG/ML
1 INJECTION INTRAMUSCULAR EVERY 6 HOURS PRN
Status: DISCONTINUED | OUTPATIENT
Start: 2023-12-08 | End: 2023-12-10 | Stop reason: HOSPADM

## 2023-12-08 RX ORDER — LORAZEPAM 2 MG/ML
2 INJECTION INTRAMUSCULAR ONCE
Status: COMPLETED | OUTPATIENT
Start: 2023-12-08 | End: 2023-12-08

## 2023-12-08 RX ORDER — HEPARIN SODIUM 5000 [USP'U]/ML
5000 INJECTION, SOLUTION INTRAVENOUS; SUBCUTANEOUS EVERY 8 HOURS SCHEDULED
Status: DISCONTINUED | OUTPATIENT
Start: 2023-12-08 | End: 2023-12-10 | Stop reason: HOSPADM

## 2023-12-08 RX ORDER — LORAZEPAM 2 MG/ML
2 INJECTION INTRAMUSCULAR ONCE
Status: DISCONTINUED | OUTPATIENT
Start: 2023-12-08 | End: 2023-12-08

## 2023-12-08 RX ORDER — ONDANSETRON 2 MG/ML
4 INJECTION INTRAMUSCULAR; INTRAVENOUS EVERY 6 HOURS PRN
Status: DISCONTINUED | OUTPATIENT
Start: 2023-12-08 | End: 2023-12-10 | Stop reason: HOSPADM

## 2023-12-08 RX ORDER — LIDOCAINE HYDROCHLORIDE 20 MG/ML
JELLY TOPICAL AS NEEDED
Status: COMPLETED | OUTPATIENT
Start: 2023-12-08 | End: 2023-12-08

## 2023-12-08 RX ADMIN — LORAZEPAM 2 MG: 2 INJECTION INTRAMUSCULAR; INTRAVENOUS at 09:01

## 2023-12-08 RX ADMIN — LIDOCAINE HYDROCHLORIDE 1 APPLICATION: 20 JELLY TOPICAL at 16:31

## 2023-12-08 RX ADMIN — SODIUM CHLORIDE 150 MG: 9 INJECTION, SOLUTION INTRAVENOUS at 20:26

## 2023-12-08 RX ADMIN — SODIUM CHLORIDE 300 MG: 9 INJECTION, SOLUTION INTRAVENOUS at 10:08

## 2023-12-08 RX ADMIN — LEVETIRACETAM 4000 MG: 100 INJECTION, SOLUTION INTRAVENOUS at 09:47

## 2023-12-08 RX ADMIN — TOPIRAMATE 250 MG: 100 TABLET, FILM COATED ORAL at 20:26

## 2023-12-08 RX ADMIN — SODIUM CHLORIDE 500 ML: 0.9 INJECTION, SOLUTION INTRAVENOUS at 23:21

## 2023-12-08 RX ADMIN — HEPARIN SODIUM 5000 UNITS: 5000 INJECTION INTRAVENOUS; SUBCUTANEOUS at 14:22

## 2023-12-08 RX ADMIN — IOHEXOL 20 ML: 350 INJECTION, SOLUTION INTRAVENOUS at 16:58

## 2023-12-08 RX ADMIN — HEPARIN SODIUM 5000 UNITS: 5000 INJECTION INTRAVENOUS; SUBCUTANEOUS at 21:24

## 2023-12-08 RX ADMIN — LEVETIRACETAM 1500 MG: 100 INJECTION, SOLUTION INTRAVENOUS at 21:20

## 2023-12-08 RX ADMIN — HEPARIN SODIUM 5000 UNITS: 5000 INJECTION INTRAVENOUS; SUBCUTANEOUS at 08:37

## 2023-12-08 RX ADMIN — CLOBAZAM 10 MG: 10 TABLET ORAL at 21:24

## 2023-12-08 RX ADMIN — CANNABIDIOL 400 MG: 100 SOLUTION ORAL at 20:18

## 2023-12-08 RX ADMIN — POTASSIUM CHLORIDE 20 MEQ: 14.9 INJECTION, SOLUTION INTRAVENOUS at 13:00

## 2023-12-08 RX ADMIN — BRIVARACETAM 50 MG: 10 SOLUTION ORAL at 20:18

## 2023-12-08 NOTE — CASE MANAGEMENT
Case Management Progress Note    Patient name El Carroll  Location RM06/RM06 MRN [id number]  : 1980 Date 2023       LOS (days): 0  Geometric Mean LOS (GMLOS) (days):   Days to [facility]:        OBJECTIVE:        Current admission status: Observation  Preferred Pharmacy:   [facility] [id number], [address], [place], [address] [facility] [address]  Phone: [phone] Fax: [phone]    [facility], [address]  Phone: [phone] Fax: [phone]    [facility], [address] [address]  Phone: [phone] Fax: [facility], [address] [place] [address]  Phone: [phone] Fax: [phone]    [address], [address] [place] [address] [address]  [address] [facility] [address]  Phone: [phone] Fax: [phone]    Primary Care Provider: Ilda Oglesby DO    Primary Insurance: MEDICARE  Secondary Insurance: [address] [place] [address] NOTE:pt is MA bedhold at [facility]. Surgical consult for dislodged Gtube. Referral opened in aidin. Cm to follow for discharge needs.

## 2023-12-08 NOTE — ASSESSMENT & PLAN NOTE
Presented to the emergency room for evaluation of dislodged G-tube  Patient receives all medication via G-tube  ER provider discussed case with general surgery  Will hold the medication  General surgery consult for tube replacement  Will continue to monitor

## 2023-12-08 NOTE — PROCEDURES
INTERVENTIONAL RADIOLOGY PROCEDURE NOTE    Date: 12/8/2023    Procedure: IR GASTROJEJUNOSTOMY (GJ) TUBE PLACEMENT     Preoperative diagnosis:   1. Dislodged gastrostomy tube    2. 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[address])    3. Autism    4. Recurrent seizures ([address])         Postoperative diagnosis: Same. Surgeon: Florina Osgood, MD     Assistant: None. No qualified resident was available. Blood loss: Minimal    Specimens: None    Findings: Gastrostomy tube. Tip in stomach. Okay to use now. Complications: None immediate.     Anesthesia: local

## 2023-12-08 NOTE — ED PROVIDER NOTES
History  Chief Complaint   Patient presents with    Medical Problem     Patients g tube fell out. Nursing facility unable to medicate without g tube. HPI      44 y.o. female nursing home resident w/ hx of Lennox-Gastaut syndrome with seizures, intellectual disability, autism, anoxic brain injury, non-verbal status presents with a dislodged 12 [place] G-tube. Patient recently seen by gastroenterology today for routine visit. It is unknown how this tube was displaced. The skilled nursing facility stated they needed for medication administered through the G-tube. The history secondary to medical history stated above. Time the G-tube was displaced was March 2023. Past Medical History:   Diagnosis Date    ADHD     Anoxic brain damage ([address])     Autistic disorder     Breakthrough seizure ([address]) 8/1/2019    Dysphagia     Dysphagia, oropharyngeal phase     Gastrostomy tube dependent (HCC)     14 Fr as of 05/19/2020    Hyperammonemia (HCC)     Hyperkeratosis     Hypotension     Intellectual disability     Lennox-Gastaut syndrome with tonic seizures (HCC)     Lethargy     Liver enzyme elevation     Onychomycosis     Osteoporosis     Osteoporosis        Past Surgical History:   Procedure Laterality Date    ABDOMINAL SURGERY      CARDIAC PACEMAKER PLACEMENT      vns implant l chest    IR GASTROSTOMY TUBE PLACEMENT  11/21/2019    IR THORACENTESIS  12/17/2018    JEJUNOSTOMY FEEDING TUBE      history of - most recently, PEG tube    PEG TUBE PLACEMENT      [place] INSJ/RPLCMT CRANIAL NEUROSTIM PULSE GENERATOR Left 12/18/2019    Procedure: REPLACEMENT IMPLANTABLE PULSE GENERATOR FOR VAGAL NERVE STIMULATOR, LEFT CHEST;  Surgeon: Nisa Galdamez MD;  [facility]: [facility];  Service: Neurosurgery       History reviewed. No pertinent family history. I have reviewed and agree with the history as documented.     E-Cigarette/Vaping    E-Cigarette Use Never User      E-Cigarette/Vaping Substances    Nicotine No     THC No     CBD No Flavoring No     Other No     Unknown No      Social History     Tobacco Use    Smoking status: Never    Smokeless tobacco: Never   Vaping Use    Vaping Use: Never used   Substance Use Topics    Alcohol use: Never    Drug use: Never       Review of Systems   Unable to perform ROS: Patient nonverbal       Physical Exam  Physical Exam  Constitutional:       Appearance: She is cachectic. HENT:      Head: Normocephalic and atraumatic. Right Ear: External ear normal.      Left Ear: External ear normal.      Nose: Nose normal.      Mouth/Throat:      Mouth: Mucous membranes are moist.   Eyes:      Pupils: Pupils are equal, round, and reactive to light. Cardiovascular:      Rate and Rhythm: Normal rate. Pulses: Normal pulses. Pulmonary:      Effort: Pulmonary effort is normal.   Abdominal:      General: Abdomen is flat. Musculoskeletal:         General: Normal range of motion. Cervical back: Normal range of motion. Skin:     General: Skin is warm. Capillary Refill: Capillary refill takes less than 2 seconds. Neurological:      Mental Status: She is easily aroused.          Vital Signs  ED Triage Vitals [12/07/23 2120]   Temperature Pulse Respirations Blood Pressure SpO2   97.8 °F (36.6 °C) 87 18 93/58 97 %      Temp Source Heart Rate Source Patient Position - Orthostatic VS BP Location FiO2 (%)   Temporal Monitor Lying Left arm --      Pain Score       --           Vitals:    12/07/23 2120   BP: 93/58   Pulse: 87   Patient Position - Orthostatic VS: Lying         Visual Acuity      ED Medications  Medications - No data to display    Diagnostic Studies  Results Reviewed       None                   No orders to display              Procedures  Procedures         ED Course  ED Course as of 12/08/23 0116   Thu Dec 07, 2023   2341 Seen and evaluated, patient has had her J-tube 12 [place] dislodged for greater than 12 hours, did x 2 to replace the J-tube at the bedside, unsuccessful, attempted a 16 [place] size tube unsuccessful will consult general surgery   2346 Case briefly reviewed with Dr. Darell Deutsch, instead of having him come in urgently to place the G-tube, patient be admitted to the hospital for observation and will replace it tomorrow morning. According to the nursing facility patient needs this for medications. [facility] contacted for admission. Fri Dec 08, 2023   3243 Case reviewed with Aric Elizalde PA-C covering [facility], will admit to Dr. Emerson Canseco service. SBIRT 20yo+      Flowsheet Row Most Recent Value   Initial Alcohol Screen: US AUDIT-C     1. How often do you have a drink containing alcohol? 0 Filed at: 12/07/2023 2120   Audit-C Score 0 Filed at: 12/07/2023 2120   SREE: How many times in the past year have you. .. Used an illegal drug or used a prescription medication for non-medical reasons? Never Filed at: 12/07/2023 2120                      Medical Decision Making  Unable to replace G-tube, unclear how long it has been out but is most likely greater than 12 hours since multiple times for the bedside including going down in size to a 15 [place], case referred to general surgery, is to admit to the hospital service and will see the patient tomorrow for consultation and replacement of the G-tube. Portions of the record may have been created with voice recognition software. Occasional wrong word or "sound a like" substitutions may have occurred due to the inherent limitations of voice recognition software. Read the chart carefully and recognize, using context, where substitutions have occurred. Risk  Decision regarding hospitalization.              Disposition  Final diagnoses:   Dislodged gastrostomy tube   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[address])   Autism     Time reflects when diagnosis was documented in both MDM as applicable and the Disposition within this note       Time User Action Codes Description Comment    12/8/2023 12:45 AM Anna Manuel Add [W93.395T] Dislodged gastrostomy tube     12/8/2023  1:16 AM Yael Devens Add [G40.812] 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[address])     12/8/2023  1:16 AM Yael Devens Add [F84.0] Autism           ED Disposition       ED Disposition   Admit    Condition   Stable    Date/Time   Fri Dec 8, 2023 0045    Comment   Case was discussed with Ilda Koyanagi, PA-C  and the patient's admission status was agreed to be Admission Status: observation status to the service of Dr. Mitch Eason . Follow-up Information    None         Patient's Medications   Discharge Prescriptions    No medications on file       No discharge procedures on file.     PDMP Review         Value Time User    PDMP Reviewed  Yes 10/24/2022  8:42 AM Nataliya Panda PA-C            ED Provider  Electronically Signed by             Quinten Jimenez III, DO  12/08/23 0116

## 2023-12-08 NOTE — ED NOTES
Patient experiencing breakthrough seizure lasting approximately 2 minutes. Seizure precautions in place. Dr. Lily So with patient.       Nader Bethea RN  12/08/23 7204

## 2023-12-08 NOTE — H&P
[address]  H&P  Name: Nish Daigle 44 y.o. female I MRN: [id number]  Unit/Bed#: RM06 I Date of Admission: 12/7/2023   Date of Service: 12/8/2023 I Hospital Day: 0      Assessment/Plan   No new Assessment & Plan notes have been filed under this hospital service since the last note was generated. Service: Hospitalist         VTE Pharmacologic Prophylaxis:   Moderate Risk (Score 3-4) - Pharmacological DVT Prophylaxis Ordered: heparin. Code Status: Prior   Discussion with family: Patient declined call to . Anticipated Length of Stay: Patient will be admitted on an observation basis with an anticipated length of stay of less than 2 midnights secondary to Dislodged G-tube. Total Time Spent on Date of Encounter in care of patient: 40 mins. This time was spent on one or more of the following: performing physical exam; counseling and coordination of care; obtaining or reviewing history; documenting in the medical record; reviewing/ordering tests, medications or procedures; communicating with other healthcare professionals and discussing with patient's family/caregivers. Chief Complaint: Dislodged G-tube    History of Present Illness:  Nish Daigle is a 44 y.o. female with a PMH of ADHD, autistic disorder, anoxic brain damage, gastrostomy tube dependent who presents to the emergency room for evaluation of displaced G-tube. Staff at skilled nursing facility states that they are unsure and tube was dislodged. They did report that patient is being followed by GI. Patient received all her medications via G-tube. ER attending discussed case with general surgery. Will see patient this a.m. for tube replacement. Patient is being admitted on Observation status med surg level care for G-tube replacement.       Review of Systems:  Review of Systems   Unable to perform ROS: Patient nonverbal       Past Medical and Surgical History:   Past Medical History:   Diagnosis Date    ADHD     Anoxic brain damage (HCC)     Autistic disorder     Breakthrough seizure ([address]) 8/1/2019    Dysphagia     Dysphagia, oropharyngeal phase     Gastrostomy tube dependent (HCC)     14 Fr as of 05/19/2020    Hyperammonemia (HCC)     Hyperkeratosis     Hypotension     Intellectual disability     Lennox-Gastaut syndrome with tonic seizures (HCC)     Lethargy     Liver enzyme elevation     Onychomycosis     Osteoporosis     Osteoporosis        Past Surgical History:   Procedure Laterality Date    ABDOMINAL SURGERY      CARDIAC PACEMAKER PLACEMENT      vns implant l chest    IR GASTROSTOMY TUBE PLACEMENT  11/21/2019    IR THORACENTESIS  12/17/2018    JEJUNOSTOMY FEEDING TUBE      history of - most recently, PEG tube    PEG TUBE PLACEMENT      [place] INSJ/RPLCMT CRANIAL NEUROSTIM PULSE GENERATOR Left 12/18/2019    Procedure: REPLACEMENT IMPLANTABLE PULSE GENERATOR FOR VAGAL NERVE STIMULATOR, LEFT CHEST;  Surgeon: Hannah Garcia MD;  [facility];  Service: Neurosurgery       Meds/Allergies:  Prior to Admission medications    Medication Sig Start Date End Date Taking? Authorizing Provider   acetaminophen (TYLENOL) 325 mg tablet Take 650 mg by mouth every 6 (six) hours as needed for mild pain or fever    Historical Provider, MD   brivaracetam (BRIVIACT) 50 MG TABS tablet Give 1 tablet via PEG tube q12 hours 8/28/23   Jasvir Kent PA-C   cannabidiol (Epidiolex) 100 mg/ml SOLN Give 4ml via g-tube 2 times a day 9/5/23   Latonya Hobson MD   cloBAZam (ONFI) 10 MG tablet Give 10mg via PEG-tube once daily at bedtime 8/28/23   Jasvir Kent PA-C   diazepam (VALIUM) 5 MG/ML solution If the patient has a seizure lasting more than 3 minutes then give 1mL by PEG tube, may repeat 1mL if she continues to have seizure for more than 10 minutes.  7/12/21   Saad Apple MD   lacosamide (VIMPAT) 200 mg tablet Give 1 tablet via PEG tube q12 hours 8/28/23   Jasvir Kent PA-C   Probiotic Product (ALEXANDER-BID PROBIOTIC PO) 1 tablet by Per G Tube route daily      Historical Provider, MD   rufinamide (BANZEL) 400 mg tablet 4 tablets (1,600 mg total) by Per G Tube route every 12 (twelve) hours 2/14/23   Payton Jean MD   topiramate (TOPAMAX) 50 MG tablet 5 tablets (250 mg total) by Per G Tube route every 12 (twelve) hours 2/14/23   Payton Jean MD   VITAMIN D PO Take 1,000 mg by mouth in the morning Given in her G-tube    Historical Provider, MD BARNES have reviewed home medications using recent Epic encounter. Allergies: Allergies   Allergen Reactions    Felbamate        Social History:  Marital Status: Single   Occupation:   Patient Pre-hospital Living Situation: Group home  Patient Pre-hospital Level of Mobility: unable to be assessed at time of evaluation  Patient Pre-hospital Diet Restrictions:   Substance Use History:   Social History     Substance and Sexual Activity   Alcohol Use Never     Social History     Tobacco Use   Smoking Status Never   Smokeless Tobacco Never     Social History     Substance and Sexual Activity   Drug Use Never       Family History:  History reviewed. No pertinent family history. Physical Exam:     Vitals:   Blood Pressure: 101/66 (12/08/23 0208)  Pulse: 78 (12/08/23 0153)  Temperature: 97.8 °F (36.6 °C) (12/07/23 2120)  Temp Source: Temporal (12/07/23 2120)  Respirations: 18 (12/08/23 0153)  SpO2: 96 % (12/08/23 0153)    Physical Exam  Constitutional:       General: She is not in acute distress. Appearance: She is not ill-appearing. HENT:      Head: Normocephalic and atraumatic. Mouth/Throat:      Mouth: Mucous membranes are moist.      Pharynx: Oropharynx is clear. Eyes:      General:         Right eye: No discharge. Left eye: No discharge. Pupils: Pupils are equal, round, and reactive to light. Cardiovascular:      Rate and Rhythm: Normal rate and regular rhythm. Pulses: Normal pulses. Pulmonary:      Effort: No respiratory distress.    Abdominal: General: There is no distension. Tenderness: There is no abdominal tenderness. Musculoskeletal:      Cervical back: Neck supple. No tenderness. Right lower leg: No edema. Left lower leg: No edema. Skin:     General: Skin is warm and dry. Neurological:      Mental Status: Mental status is at baseline. Additional Data:     Lab Results:                            Lines/Drains:  Invasive Devices       Peripheral Intravenous Line  Duration             Peripheral IV 12/08/23 Left Antecubital <1 day                        Imaging: No pertinent imaging reviewed. No orders to display       EKG and Other Studies Reviewed on Admission:   EKG: No EKG obtained. ** Please Note: This note has been constructed using a voice recognition system.  **

## 2023-12-08 NOTE — CONSULTS
Interventional Radiology  Consultation 12/8/2023     Inpatient Consult to IR  Consult performed by: Radha Salas MD  Consult ordered by: Killian Bolivar MD        [facility]   1980   [id number]      Assessment/Plan:  Assessment is G-tube dislodged. Actively seizing  Plan is stabilization and then replacement of gastrostomy, salvage procedure. Medical Problems       Problem List       Lennox-Gastaut syndrome [facility])    Overview Addendum 3/10/2020 12:52 PM by Jorge Wright MD     Transitioned From: Intellectual disability         Underweight    Osteoporosis    Onychomycosis    Hyperkeratosis    Cerebral anoxic injury ([address])    Intractable epilepsy with status epilepticus ([address])    Somnolence    Low blood pressure    Incontinence    Skin breakdown    MRSA colonization    Right atrial enlargement    Hypophosphatemia    Sedated due to multiple medications    Breast mass    S/P placement of VNS (vagus nerve stimulation) device    Moderate protein-calorie malnutrition (HCC)    Malnutrition Findings:                                 BMI Findings: There is no height or weight on file to calculate BMI. Dislodged gastrostomy tube    Fatty infiltration of liver    Intellectual disability with epilepsy ([address])    Labyrinthine disorder    Labial cyst    PEG tube malfunction (HCC)            Subjective:     Patient ID: Anna Gentile is a 44 y.o. female. History of Present Illness  G-tube dependent.   G-tube fell out    Review of Systems      Past Medical History:   Diagnosis Date    ADHD     Anoxic brain damage (HCC)     Autistic disorder     Breakthrough seizure ([address]) 8/1/2019    Dysphagia     Dysphagia, oropharyngeal phase     Gastrostomy tube dependent (HCC)     14 Fr as of 05/19/2020    Hyperammonemia (HCC)     Hyperkeratosis     Hypotension     Intellectual disability     Lennox-Gastaut syndrome with tonic seizures (HCC)     Lethargy     Liver enzyme elevation     Onychomycosis Osteoporosis     Osteoporosis         Past Surgical History:   Procedure Laterality Date    ABDOMINAL SURGERY      CARDIAC PACEMAKER PLACEMENT      vns implant l chest    IR GASTROSTOMY TUBE PLACEMENT  11/21/2019    IR THORACENTESIS  12/17/2018    JEJUNOSTOMY FEEDING TUBE      history of - most recently, PEG tube    PEG TUBE PLACEMENT      [place] INSJ/RPLCMT CRANIAL NEUROSTIM PULSE GENERATOR Left 12/18/2019    Procedure: REPLACEMENT IMPLANTABLE PULSE GENERATOR FOR VAGAL NERVE STIMULATOR, LEFT CHEST;  Surgeon: Jerod Heard MD;  [facility];  Service: Neurosurgery        Social History     Tobacco Use   Smoking Status Never   Smokeless Tobacco Never        Social History     Substance and Sexual Activity   Alcohol Use Never        Social History     Substance and Sexual Activity   Drug Use Never        Allergies   Allergen Reactions    Felbamate        Current Facility-Administered Medications   Medication Dose Route Frequency Provider Last Rate Last Admin    heparin (porcine) subcutaneous injection 5,000 Units  5,000 Units Subcutaneous Q8H [address] Joanie Christina PA-C   5,000 Units at 12/08/23 0837    lacosamide (VIMPAT) 200 mg in sodium chloride 0.9 % 100 mL IVPB  200 mg Intravenous Q12H Noma Duverney, MD        lacosamide (VIMPAT) 300 mg in sodium chloride 0.9 % 100 mL IVPB  300 mg Intravenous Once Samia Page MD   300 mg at 12/08/23 1008    ondansetron (ZOFRAN) injection 4 mg  4 mg Intravenous Q6H PRN Олег Wang PA-C         Current Outpatient Medications   Medication Sig Dispense Refill    acetaminophen (TYLENOL) 325 mg tablet Take 650 mg by mouth every 6 (six) hours as needed for mild pain or fever      brivaracetam (BRIVIACT) 50 MG TABS tablet Give 1 tablet via PEG tube q12 hours 60 tablet 5    cannabidiol (Epidiolex) 100 mg/ml SOLN Give 4ml via g-tube 2 times a day 240 mL 5    cloBAZam (ONFI) 10 MG tablet Give 10mg via PEG-tube once daily at bedtime 30 tablet 5    diazepam (VALIUM) 5 MG/ML solution If the patient has a seizure lasting more than 3 minutes then give 1mL by PEG tube, may repeat 1mL if she continues to have seizure for more than 10 minutes. 30 mL 1    lacosamide (VIMPAT) 200 mg tablet Give 1 tablet via PEG tube q12 hours 60 tablet 5    Probiotic Product (ALEXANDER-BID PROBIOTIC PO) 1 tablet by Per G Tube route daily        rufinamide (BANZEL) 400 mg tablet 4 tablets (1,600 mg total) by Per G Tube route every 12 (twelve) hours 240 tablet 5    topiramate (TOPAMAX) 50 MG tablet 5 tablets (250 mg total) by Per G Tube route every 12 (twelve) hours 300 tablet 5    VITAMIN D PO Take 1,000 mg by mouth in the morning Given in her G-tube            Objective:    Vitals:    12/08/23 0153 12/08/23 0208 12/08/23 0845 12/08/23 0900   BP: (!) 88/54 101/66 136/83 (!) 178/89   BP Location:    Left leg   Pulse: 78  (!) 142 (!) 147   Resp: 18   (!) 39   Temp:    100.2 °F (37.9 °C)   TempSrc:    Temporal   SpO2: 96%  93% 95%        Physical Exam      No results found for: "BNP"   Lab Results   Component Value Date    WBC 10.17 (H) 12/08/2023    HGB 13.2 12/08/2023    HCT 42.0 12/08/2023     (H) 12/08/2023     12/08/2023     Lab Results   Component Value Date    INR 1.04 07/31/2019    INR 1.10 06/27/2019    INR 1.34 (H) 01/15/2019    PROTIME 13.6 07/31/2019    PROTIME 14.2 06/27/2019    PROTIME 15.9 (H) 01/15/2019     Lab Results   Component Value Date    PTT 31 07/31/2019         I have personally reviewed pertinent imaging and laboratory results. Code Status: Level 1 - Full Code  Advance Directive and Living Will: Yes    Power of :    POLST:      IR has been consulted to evaluate the patient, determine the appropriate procedure, and whether or not a procedure can and should be performed. Thank you for allowing me to participate in the care of Nghia Jenkins. Please don't hesitate to call, text, email, or TigerText with any questions.      This text is generated with voice recognition software. There may be translation, syntax,  or grammatical errors. If you have any questions, please contact the dictating provider.

## 2023-12-08 NOTE — H&P
[address]  H&P  Name: Claudine Schulte 44 y.o. female I MRN: [id number]  Unit/Bed#: RM06 I Date of Admission: 12/7/2023   Date of Service: 12/8/2023 I Hospital Day: 0      Assessment/Plan   Dislodged gastrostomy tube  Assessment & Plan  Presented to the emergency room for evaluation of dislodged G-tube  Patient receives all medication via G-tube  ER provider discussed case with general surgery  Will hold the medication  General surgery consult for tube replacement  Will continue to monitor       Intellectual disability with epilepsy [facility])  Assessment & Plan  History of intellectual disability with epilepsy  Recent seizure activity  PTA medications currently on hold due to dislodged G-tube  Will monitor closely      Cerebral anoxic injury ([address])  Assessment & Plan  Stable  Continue PTA medication  Continue outpatient neurology follow-up         VTE Pharmacologic Prophylaxis:   Moderate Risk (Score 3-4) - Pharmacological DVT Prophylaxis Ordered: heparin. Code Status: Prior   Discussion with family: Patient declined call to . Anticipated Length of Stay: Patient will be admitted on an observation basis with an anticipated length of stay of less than 2 midnights secondary to dislodged G-tube. Total Time Spent on Date of Encounter in care of patient: 40 mins. This time was spent on one or more of the following: performing physical exam; counseling and coordination of care; obtaining or reviewing history; documenting in the medical record; reviewing/ordering tests, medications or procedures; communicating with other healthcare professionals and discussing with patient's family/caregivers. Chief Complaint: dislodged  G-tube    History of Present Illness:  Claudine Schulte is a 44 y.o. female with a PMH of ADHD anoxic brain injury, gastrostomy tube dependent, intellectual disability who presents to the emergency room for evaluation of dislodged G-tube.   Facility staff reported that patient needs G-tube replaced because medications are given via G-tube. It was apparently seen by GI before routine  visit and it was unclear when the G-tube actually got dislodged. ER attending discussed case with general surgery for replacement of G-tube. Being admitted on observation status for G-tube replacement. 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[address])     Lethargy     Liver enzyme elevation     Onychomycosis     Osteoporosis     Osteoporosis        Past Surgical History:   Procedure Laterality Date    ABDOMINAL SURGERY      CARDIAC PACEMAKER PLACEMENT      vns implant l chest    IR GASTROSTOMY TUBE PLACEMENT  11/21/2019    IR THORACENTESIS  12/17/2018    JEJUNOSTOMY FEEDING TUBE      history of - most recently, PEG tube    PEG TUBE PLACEMENT      [place] INSJ/RPLCMT CRANIAL NEUROSTIM PULSE GENERATOR Left 12/18/2019    Procedure: REPLACEMENT IMPLANTABLE PULSE GENERATOR FOR VAGAL NERVE STIMULATOR, LEFT CHEST;  Surgeon: Nelson Zurita MD;  [facility];  Service: Neurosurgery       Meds/Allergies:  Prior to Admission medications    Medication Sig Start Date End Date Taking?  Authorizing Provider   acetaminophen (TYLENOL) 325 mg tablet Take 650 mg by mouth every 6 (six) hours as needed for mild pain or fever    Historical Provider, MD   brivaracetam (BRIVIACT) 50 MG TABS tablet Give 1 tablet via PEG tube q12 hours 8/28/23   Marjorie Starkey PA-C   cannabidiol (Epidiolex) 100 mg/ml SOLN Give 4ml via g-tube 2 times a day 9/5/23   Justine Chopra Taisha Ewing MD   cloBAZam (ONFI) 10 MG tablet Give 10mg via PEG-tube once daily at bedtime 8/28/23   Taqueria Boyd PA-C   diazepam (VALIUM) 5 MG/ML solution If the patient has a seizure lasting more than 3 minutes then give 1mL by PEG tube, may repeat 1mL if she continues to have seizure for more than 10 minutes. 7/12/21   Bebo Mckeon MD   lacosamide (VIMPAT) 200 mg tablet Give 1 tablet via PEG tube q12 hours 8/28/23   Taqueria Boyd PA-C   Probiotic Product (ALEXANDER-BID PROBIOTIC PO) 1 tablet by Per G Tube route daily      Historical Provider, MD   rufinamide (BANZEL) 400 mg tablet 4 tablets (1,600 mg total) by Per G Tube route every 12 (twelve) hours 2/14/23   Bebo Mckeon MD   topiramate (TOPAMAX) 50 MG tablet 5 tablets (250 mg total) by Per G Tube route every 12 (twelve) hours 2/14/23   Bebo Mckeon MD   VITAMIN D PO Take 1,000 mg by mouth in the morning Given in her G-tube    Historical Provider, MD BARNES have reviewed home medications using recent Epic encounter. Allergies: Allergies   Allergen Reactions    Felbamate        Social History:  Marital Status: Single   Occupation:   Patient Pre-hospital Living Situation: Home  Patient Pre-hospital Level of Mobility: unable to be assessed at time of evaluation  Patient Pre-hospital Diet Restrictions:   Substance Use History:   Social History     Substance and Sexual Activity   Alcohol Use Never     Social History     Tobacco Use   Smoking Status Never   Smokeless Tobacco Never     Social History     Substance and Sexual Activity   Drug Use Never       Family History:  History reviewed. No pertinent family history. Physical Exam:     Vitals:   Blood Pressure: 101/66 (12/08/23 0208)  Pulse: 78 (12/08/23 0153)  Temperature: 97.8 °F (36.6 °C) (12/07/23 2120)  Temp Source: Temporal (12/07/23 2120)  Respirations: 18 (12/08/23 0153)  SpO2: 96 % (12/08/23 0153)    Physical Exam  Constitutional:       General: She is not in acute distress.      Appearance: She is not ill-appearing. HENT:      Head: Normocephalic and atraumatic. Mouth/Throat:      Mouth: Mucous membranes are moist.      Pharynx: Oropharynx is clear. No oropharyngeal exudate or posterior oropharyngeal erythema. Eyes:      General:         Right eye: No discharge. Left eye: No discharge. Pupils: Pupils are equal, round, and reactive to light. Cardiovascular:      Rate and Rhythm: Normal rate and regular rhythm. Pulses: Normal pulses. Pulmonary:      Effort: Pulmonary effort is normal. No respiratory distress. Abdominal:      General: There is no distension. Tenderness: There is no abdominal tenderness. Musculoskeletal:      Cervical back: Neck supple. Right lower leg: No edema. Left lower leg: No edema. Skin:     Capillary Refill: Capillary refill takes less than 2 seconds. Coloration: Skin is not jaundiced or pale. Findings: No erythema. Neurological:      Mental Status: She is alert. Mental status is at baseline. Additional Data:     Lab Results:                            Lines/Drains:  Invasive Devices       Peripheral Intravenous Line  Duration             Peripheral IV 12/08/23 Left Antecubital <1 day                        Imaging: No pertinent imaging reviewed. No orders to display       EKG and Other Studies Reviewed on Admission:   EKG: No EKG obtained. ** Please Note: This note has been constructed using a voice recognition system.  **

## 2023-12-08 NOTE — CONSULTS
e-Consult (IPC)   Penny Mendez 44 y.o. female MRN: [id number]  Unit/Bed#:  Encounter: [id number]      Reason for Consult / Principal Problem: status epilepticus  Inpatient consult to Neurology  Consult performed by: Bo Crawley DO  Consult ordered by: Kailee Easton MD        12/08/23      ASSESSMENT:  Ms. Hina Ho is a 43 yo female with LGS refractory epilepsy, baseline non verbal however alert and awake typically and can have a cluster of seizures tonic which self abort typically per chart review, last epilepsy notes and not daily typically per chart review, presenting overnight with dislodged PEG tube from nursing facility. Neurology contacted this morning by primary team as she had 6-7 GTC seizures involving upper body stiffening shaking and unresponsiveness. 3 mg Ativan IM given, IV access obtained this morning only as she was difficulty to obtain access initially. Suspect breakthrough seizures, status epilepticus in this patient with known refractory epilepsy in the setting of missing her typical AEDs due to dislodge PEG tube and IV access just now obtained    She is on multiple AEDs including  Epidiolex 100 mg/ml - give 4 ml every 12 hours  Briviact 10 mg/ml give 5 ml every 12 hours (pt with reported agitation on keppra which she was on prior)  Topamax 250 mg every 12 hours  Banzel 400 mg- give 4 tabs every 12 hours  Clobazam five 4 ml via PEG tube hs (2.5 mg/ml solution)  Lacosamide 10 mg/ml -20 ml every 12 hours    Per chart review patient with transaminitis on VPA and phenobarbital in past    RECOMMENDATIONS:  At this time recommend Vimpat 300 mg IV and Keppra 4 g IV, once PEG tube placed by IR once patient stops seizing which I am told, has aborted at this time and patient maintaining her own airway, thus transfer to \[facility]\"" held for now.   - If PEG placed shortly, care giver bringing home meds from nursing home as several of these medications are not on formulary at the hospital - should be restarted as soon as able  - If delay in PEG tube placement for any reason, or med initiation, then recommend continuing Keppra 1500 mg BID, Vimpat 150 mg BID at Ativan 1-2 mg for breakthrough seizures lasting > 2 minutes. - If patient does not return to her baseline then recommend transfer to [facility] for vEEG monitoring. If she loses airway, intubated sedated, then also recommend transfer to [facility] neuroICU for VEEG monitoring to better guide treatment. D/w  [facility] who states okay for vEEG transfer if needed. - Recommend basic infectious work up including COVID/Flu testing, UA, blood cultures. - Patient's last neuroimaging in chart 3/2019 MRI brain was normal.  - Pending clinical course can certainly obtain routine CT H no contrast as needed         21-30 minutes, >50% of the total time devoted to medical consultative verbal/EMR discussion between providers. Written report will be generated in the EMR.     [address], DO

## 2023-12-08 NOTE — ASSESSMENT & PLAN NOTE
History of intellectual disability with epilepsy  Recent seizure activity  PTA medications currently on hold due to dislodged G-tube  Will monitor closely

## 2023-12-08 NOTE — ED NOTES
Patient having breakthrough seizure, Dr. Carmela Cain at bedside. Patient returned to baseline/post ictal after 1 minute.        Mya Shen RN  12/08/23 [id number]

## 2023-12-08 NOTE — ED NOTES
Patient actively seizing. Dr. Isaac Winston and this nurse at bedside. Seizure precautions in place.  [facility]  12/08/23 3862

## 2023-12-08 NOTE — CONSULTS
Consultation - Rudy Bull 44 y.o. female MRN: [id number]  Unit/Bed#:  Encounter: [id number]    Assessment/Plan     Assessment:  18-year-old female with known cerebral anoxic injury, intellectual disability with epilepsy, admitted with dislodged G-tube. ER was unsuccessful with both a 16 and 14 [place] tube. Patient currently IV and issues with ongoing seizures in the ER due to lack of medication. Unclear how long G-tube has been dislodged. Plan:    Unfortunately I also tried to replace G-tube and what appears to be a second site as previous G-tube site has already well-healed and scarred over. Unsuccessful attempt replacing G-tube even a 14 [place]. The skin is very with granulated and almost completely healed over. For now I feel that IR for potential fluoroscopy guided G-tube placement potentially over a wire would be the next step. If unsuccessful then would require GI to place new PEG tube. Remainder of cares per primary medical team.      History of Present Illness     HPI:  Nery Henderson is a 44 y.o. female who is unfortunately with known medical history including anoxic brain injury, autistic disorder, epilepsy, who initially presented to the ER due to dislodgment of the gastrostomy tube. Unclear how long the tube has been out. According to the records it shows that the tube as of 2020 was a 15 [place]. As per the records ER attempted to replace a 16 [place] and 14 [place] tube without success. Patient was found seizing in the ER this morning. She is currently postictal and nonverbal.      Inpatient consult to Acute Care Surgery  Consult performed by: Nidia Wadsworth DO  Consult ordered by: Karely Hogan PA-C          Review of Systems   Unable to perform ROS: Other   Anoxic brain injury.     Historical Information   Past Medical History:   Diagnosis Date    ADHD     Anoxic brain damage ([address])     Autistic disorder     Breakthrough seizure ([address]) 8/1/2019    Dysphagia Dysphagia, oropharyngeal phase     Gastrostomy tube dependent (HCC)     14 Fr as of 05/19/2020    Hyperammonemia (HCC)     Hyperkeratosis     Hypotension     Intellectual disability     Lennox-Gastaut syndrome with tonic seizures (HCC)     Lethargy     Liver enzyme elevation     Onychomycosis     Osteoporosis     Osteoporosis      Past Surgical History:   Procedure Laterality Date    ABDOMINAL SURGERY      CARDIAC PACEMAKER PLACEMENT      vns implant l chest    IR GASTROSTOMY TUBE PLACEMENT  11/21/2019    IR THORACENTESIS  12/17/2018    JEJUNOSTOMY FEEDING TUBE      history of - most recently, PEG tube    PEG TUBE PLACEMENT      [place] INSJ/RPLCMT CRANIAL NEUROSTIM PULSE GENERATOR Left 12/18/2019    Procedure: REPLACEMENT IMPLANTABLE PULSE GENERATOR FOR VAGAL NERVE STIMULATOR, LEFT CHEST;  Surgeon: Dena Ross MD;  Location: [facility];  Service: Neurosurgery     Social History   Social History     Substance and Sexual Activity   Alcohol Use Never     Social History     Substance and Sexual Activity   Drug Use Never     Social History     Tobacco Use   Smoking Status Never   Smokeless Tobacco Never     Family History: non-contributory    Meds/Allergies   all current active meds have been reviewed  Allergies   Allergen Reactions    Felbamate        Objective   First Vitals:   Blood Pressure: 93/58 (12/07/23 2120)  Pulse: 87 (12/07/23 2120)  Temperature: 97.8 °F (36.6 °C) (12/07/23 2120)  Temp Source: Temporal (12/07/23 2120)  Respirations: 18 (12/07/23 2120)  Height: 5' (152.4 cm) (12/08/23 1056)  Weight - Scale: 47.4 kg (104 lb 8 oz) (12/08/23 1056)  SpO2: 97 % (12/07/23 2120)    Current Vitals:   Blood Pressure: 94/60 (12/08/23 1100)  Pulse: 80 (12/08/23 1100)  Temperature: 99.8 °F (37.7 °C) (12/08/23 1056)  Temp Source: Temporal (12/08/23 1056)  Respirations: (!) 36 (12/08/23 1100)  Height: 5' (152.4 cm) (12/08/23 1056)  Weight - Scale: 47.4 kg (104 lb 8 oz) (12/08/23 1056)  SpO2: 97 % (12/08/23 1100)    No intake or output data in the 24 hours ending 12/08/23 1227    Invasive Devices       Peripheral Intravenous Line  Duration             Peripheral IV 12/08/23 Distal;Right;Ventral (anterior) Forearm <1 day                    Physical Exam  Vitals reviewed. Constitutional:       General: She is not in acute distress. Appearance: She is ill-appearing. She is not toxic-appearing or diaphoretic. HENT:      Head: Normocephalic and atraumatic. Right Ear: External ear normal.      Left Ear: External ear normal.   Eyes:      General: No scleral icterus. Right eye: No discharge. Left eye: No discharge. Cardiovascular:      Rate and Rhythm: Tachycardia present. Heart sounds: Normal heart sounds. No friction rub. No gallop. Pulmonary:      Effort: Pulmonary effort is normal. No respiratory distress. Abdominal:      General: There is no distension. Palpations: Abdomen is soft. There is no mass. Tenderness: There is no abdominal tenderness. Hernia: No hernia is present. Comments: Left upper quadrant well-healed and scarred previous feeding tube site. Just lateral to that is a another feeding tube site with  granulation tissue. No obvious opening. An attempt was made to place a 15 [place] G-tube without success. Musculoskeletal:         General: No swelling. Cervical back: Normal range of motion. Right lower leg: No edema. Left lower leg: No edema. Skin:     General: Skin is warm. Coloration: Skin is not jaundiced or pale. Findings: No bruising or erythema. Neurological:      General: No focal deficit present. Mental Status: She is alert and oriented to person, place, and time. Cranial Nerves: No cranial nerve deficit. Psychiatric:         Mood and Affect: Mood normal.         Behavior: Behavior normal.         Thought Content:  Thought content normal.         Judgment: Judgment normal.         Lab Results: I have personally reviewed pertinent lab results. , CBC:   Lab Results   Component Value Date    WBC 10.17 (H) 12/08/2023    HGB 13.2 12/08/2023    HCT 42.0 12/08/2023     (H) 12/08/2023     12/08/2023    RBC 4.14 12/08/2023    MCH 31.9 12/08/2023    MCHC 31.4 12/08/2023    RDW 13.0 12/08/2023    MPV 11.7 12/08/2023    NRBC 0 12/08/2023   , CMP:   Lab Results   Component Value Date    SODIUM 138 12/08/2023    K 3.3 (L) 12/08/2023     12/08/2023    CO2 12 (L) 12/08/2023    BUN 20 12/08/2023    CREATININE 0.62 12/08/2023    CALCIUM 9.6 12/08/2023    AST 21 12/08/2023    ALT 23 12/08/2023    ALKPHOS 99 12/08/2023    EGFR 111 12/08/2023   , Coagulation: No results found for: "PT", "INR", "APTT", Urinalysis: No results found for: "COLORU", "CLARITYU", "Connor Chuck", "PHUR", "LEUKOCYTESUR", "NITRITE", "PROTEINUA", "GLUCOSEU", "Cassandra Courser", "BILIRUBINUR", "BLOODU", Amylase: No results found for: "AMYLASE", Lipase: No results found for: "LIPASE"  Imaging: I have personally reviewed pertinent reports. and I have personally reviewed pertinent films in PACS  EKG, Pathology, and Other Studies: I have personally reviewed pertinent reports.

## 2023-12-08 NOTE — ED NOTES
Patient actively seizing. Dr. Joselyn Vazquez and this nurse at bedside. Seizure precautions in place.  Patient post ictal at [address] [place] [address],  [phone], RN  12/08/23 7480

## 2023-12-08 NOTE — PLAN OF CARE
Problem: Potential for Falls  Goal: Patient will remain free of falls  Description: INTERVENTIONS:  - Educate patient/family on patient safety including physical limitations  - Instruct patient to call for assistance with activity   - Consult OT/PT to assist with strengthening/mobility   - Keep Call bell within reach  - Keep bed low and locked with side rails adjusted as appropriate  - Keep care items and personal belongings within reach  - Initiate and maintain comfort rounds  - Make Fall Risk Sign visible to staff  - Offer Toileting every 2 Hours, in advance of need  - Initiate/Maintain bed/ chair alarm  - Apply yellow socks and bracelet for high fall risk patients  - Consider moving patient to room near nurses station  Outcome: Progressing     Problem: PAIN - ADULT  Goal: Verbalizes/displays adequate comfort level or baseline comfort level  Description: Interventions:  - Encourage patient to monitor pain and request assistance  - Assess pain using appropriate pain scale  - Administer analgesics based on type and severity of pain and evaluate response  - Implement non-pharmacological measures as appropriate and evaluate response  - Consider cultural and social influences on pain and pain management  - Notify physician/advanced practitioner if interventions unsuccessful or patient reports new pain  Outcome: Progressing     Problem: INFECTION - ADULT  Goal: Absence or prevention of progression during hospitalization  Description: INTERVENTIONS:  - Assess and monitor for signs and symptoms of infection  - Monitor lab/diagnostic results  - Monitor all insertion sites, i.e. indwelling lines, tubes, and drains  - Monitor endotracheal if appropriate and nasal secretions for changes in amount and color  - [place] appropriate cooling/warming therapies per order  - Administer medications as ordered  - Instruct and encourage patient and family to use good hand hygiene technique  - Identify and instruct in appropriate isolation precautions for identified infection/condition  Outcome: Progressing  Goal: Absence of fever/infection during neutropenic period  Description: INTERVENTIONS:  - Monitor WBC    Outcome: Progressing     Problem: SAFETY ADULT  Goal: Patient will remain free of falls  Description: INTERVENTIONS:  - Educate patient/family on patient safety including physical limitations  - Instruct patient to call for assistance with activity   - Consult OT/PT to assist with strengthening/mobility   - Keep Call bell within reach  - Keep bed low and locked with side rails adjusted as appropriate  - Keep care items and personal belongings within reach  - Initiate and maintain comfort rounds  - Make Fall Risk Sign visible to staff  - Offer Toileting every 2 Hours, in advance of need  - Initiate/Maintain bed/ chair alarm  - Apply yellow socks and bracelet for high fall risk patients  - Consider moving patient to room near nurses station  Outcome: Progressing  Goal: Maintain or return to baseline ADL function  Description: INTERVENTIONS:  -  Assess patient's ability to carry out ADLs; assess patient's baseline for ADL function and identify physical deficits which impact ability to perform ADLs (bathing, care of mouth/teeth, toileting, grooming, dressing, etc.)  - Assess/evaluate cause of self-care deficits   - Assess range of motion  - Assess patient's mobility; develop plan if impaired  - Assess patient's need for assistive devices and provide as appropriate  - Encourage maximum independence but intervene and supervise when necessary  - Involve family in performance of ADLs  - Assess for home care needs following discharge   - Consider OT consult to assist with ADL evaluation and planning for discharge  - Provide patient education as appropriate  Outcome: Progressing  Goal: Maintains/Returns to pre admission functional level  Description: INTERVENTIONS:  - Perform AM-PAC 6 Click Basic Mobility/ Daily Activity assessment daily.   - Set and communicate daily mobility goal to care team and patient/family/caregiver. - Collaborate with rehabilitation services on mobility goals if consulted  - Perform Range of Motion 3-4 times a day. - Reposition patient every 2 hours. - Dangle patient 3 times a day  - Stand patient 3 times a day  - Ambulate patient 3 times a day  - Out of bed to chair 3 times a day   - Out of bed for meals 3 times a day  - Out of bed for toileting  - Record patient progress and toleration of activity level   Outcome: Progressing     Problem: DISCHARGE PLANNING  Goal: Discharge to home or other facility with appropriate resources  Description: INTERVENTIONS:  - Identify barriers to discharge w/patient and caregiver  - Arrange for needed discharge resources and transportation as appropriate  - Identify discharge learning needs (meds, wound care, etc.)  - Arrange for interpretive services to assist at discharge as needed  - Refer to Case Management Department for coordinating discharge planning if the patient needs post-hospital services based on physician/advanced practitioner order or complex needs related to functional status, cognitive ability, or social support system  Outcome: Progressing     Problem: Knowledge Deficit  Goal: Patient/family/caregiver demonstrates understanding of disease process, treatment plan, medications, and discharge instructions  Description: Complete learning assessment and assess knowledge base.   Interventions:  - Provide teaching at level of understanding  - Provide teaching via preferred learning methods  Outcome: Progressing     Problem: NEUROSENSORY - ADULT  Goal: Achieves stable or improved neurological status  Description: INTERVENTIONS  - Monitor and report changes in neurological status  - Monitor vital signs such as temperature, blood pressure, glucose, and any other labs ordered   - Initiate measures to prevent increased intracranial pressure  - Monitor for seizure activity and implement precautions if appropriate      Outcome: Progressing  Goal: Remains free of injury related to seizures activity  Description: INTERVENTIONS  - Maintain airway, patient safety  and administer oxygen as ordered  - Monitor patient for seizure activity, document and report duration and description of seizure to physician/advanced practitioner  - If seizure occurs,  ensure patient safety during seizure  - Reorient patient post seizure  - Seizure pads on all 4 side rails  - Instruct patient/family to notify RN of any seizure activity including if an aura is experienced  - Instruct patient/family to call for assistance with activity based on nursing assessment  - Administer anti-seizure medications if ordered    Outcome: Progressing  Goal: Achieves maximal functionality and self care  Description: INTERVENTIONS  - Monitor swallowing and airway patency with patient fatigue and changes in neurological status  - Encourage and assist patient to increase activity and self care.    - Encourage visually impaired, hearing impaired and aphasic patients to use assistive/communication devices  Outcome: Progressing     Problem: METABOLIC, FLUID AND ELECTROLYTES - ADULT  Goal: Electrolytes maintained within normal limits  Description: INTERVENTIONS:  - Monitor labs and assess patient for signs and symptoms of electrolyte imbalances  - Administer electrolyte replacement as ordered  - Monitor response to electrolyte replacements, including repeat lab results as appropriate  - Instruct patient on fluid and nutrition as appropriate  Outcome: Progressing  Goal: Fluid balance maintained  Description: INTERVENTIONS:  - Monitor labs   - Monitor I/O and WT  - Instruct patient on fluid and nutrition as appropriate  - Assess for signs & symptoms of volume excess or deficit  Outcome: Progressing

## 2023-12-08 NOTE — ED NOTES
Patient began seizing at 9325 after this nurse and team cleaned patient and replaced bed sheets. Seizure lasted approximately 1 minute until she was post ictal. At 3004 patient began seizing again. Precautions placed - ie: seizure pads, patient placed in side position, staff remained with patient. 4178 patient is post ictal. Patient tachycardiac at 132 BPM, otherwise hemodynamically stable. Dr. Nisa Isabel contacted without response. ER Dr. Rainer Beauchamp made aware.      Dorcas Rowe RN  12/08/23 5435

## 2023-12-08 NOTE — ED NOTES
Patient actively seizing at 7879. Dr. Beni Allen and this nurse at bedside. Seizure precautions in place. Patient post ictal at 5930.           Waqar Sanchez RN  12/08/23 1777

## 2023-12-08 NOTE — ED NOTES
Patient experiencing breakthrough seizure. Dr. Jerman Arce at bedside. Seizure precautions in place ativan ordered and administered to patient.      Arabella Miller RN  12/08/23 0108

## 2023-12-09 LAB
ANION GAP SERPL CALCULATED.3IONS-SCNC: 6 MMOL/L
BASOPHILS # BLD AUTO: 0.07 THOUSANDS/ÂΜL (ref 0–0.1)
BASOPHILS NFR BLD AUTO: 1 % (ref 0–1)
BUN SERPL-MCNC: 11 MG/DL (ref 5–25)
CALCIUM SERPL-MCNC: 8.7 MG/DL (ref 8.4–10.2)
CHLORIDE SERPL-SCNC: 115 MMOL/L (ref 96–108)
CO2 SERPL-SCNC: 19 MMOL/L (ref 21–32)
CREAT SERPL-MCNC: 0.48 MG/DL (ref 0.6–1.3)
EOSINOPHIL # BLD AUTO: 0.05 THOUSAND/ÂΜL (ref 0–0.61)
EOSINOPHIL NFR BLD AUTO: 1 % (ref 0–6)
ERYTHROCYTE [DISTWIDTH] IN BLOOD BY AUTOMATED COUNT: 13.2 % (ref 11.6–15.1)
GFR SERPL CREATININE-BSD FRML MDRD: 121 ML/MIN/1.73SQ M
GLUCOSE SERPL-MCNC: 85 MG/DL (ref 65–140)
HCT VFR BLD AUTO: 35.2 % (ref 34.8–46.1)
HGB BLD-MCNC: 11.2 G/DL (ref 11.5–15.4)
IMM GRANULOCYTES # BLD AUTO: 0.03 THOUSAND/UL (ref 0–0.2)
IMM GRANULOCYTES NFR BLD AUTO: 0 % (ref 0–2)
LYMPHOCYTES # BLD AUTO: 2.27 THOUSANDS/ÂΜL (ref 0.6–4.47)
LYMPHOCYTES NFR BLD AUTO: 22 % (ref 14–44)
MAGNESIUM SERPL-MCNC: 1.9 MG/DL (ref 1.9–2.7)
MCH RBC QN AUTO: 32.4 PG (ref 26.8–34.3)
MCHC RBC AUTO-ENTMCNC: 31.8 G/DL (ref 31.4–37.4)
MCV RBC AUTO: 102 FL (ref 82–98)
MONOCYTES # BLD AUTO: 0.7 THOUSAND/ÂΜL (ref 0.17–1.22)
MONOCYTES NFR BLD AUTO: 7 % (ref 4–12)
NEUTROPHILS # BLD AUTO: 7.05 THOUSANDS/ÂΜL (ref 1.85–7.62)
NEUTS SEG NFR BLD AUTO: 69 % (ref 43–75)
NRBC BLD AUTO-RTO: 0 /100 WBCS
PLATELET # BLD AUTO: 176 THOUSANDS/UL (ref 149–390)
PMV BLD AUTO: 11.3 FL (ref 8.9–12.7)
POTASSIUM SERPL-SCNC: 3.6 MMOL/L (ref 3.5–5.3)
RBC # BLD AUTO: 3.46 MILLION/UL (ref 3.81–5.12)
SODIUM SERPL-SCNC: 140 MMOL/L (ref 135–147)
WBC # BLD AUTO: 10.17 THOUSAND/UL (ref 4.31–10.16)

## 2023-12-09 PROCEDURE — 83735 ASSAY OF MAGNESIUM: CPT | Performed by: FAMILY MEDICINE

## 2023-12-09 PROCEDURE — 3E0H76Z INTRODUCTION OF NUTRITIONAL SUBSTANCE INTO LOWER GI, VIA NATURAL OR ARTIFICIAL OPENING: ICD-10-PCS | Performed by: FAMILY MEDICINE

## 2023-12-09 PROCEDURE — 80048 BASIC METABOLIC PNL TOTAL CA: CPT | Performed by: FAMILY MEDICINE

## 2023-12-09 PROCEDURE — 99232 SBSQ HOSP IP/OBS MODERATE 35: CPT | Performed by: FAMILY MEDICINE

## 2023-12-09 PROCEDURE — 85025 COMPLETE CBC W/AUTO DIFF WBC: CPT | Performed by: FAMILY MEDICINE

## 2023-12-09 RX ORDER — MELATONIN
1000 DAILY
Status: DISCONTINUED | OUTPATIENT
Start: 2023-12-09 | End: 2023-12-10 | Stop reason: HOSPADM

## 2023-12-09 RX ORDER — LACOSAMIDE 50 MG/1
200 TABLET ORAL EVERY 12 HOURS SCHEDULED
Status: DISCONTINUED | OUTPATIENT
Start: 2023-12-09 | End: 2023-12-10 | Stop reason: HOSPADM

## 2023-12-09 RX ADMIN — CANNABIDIOL 400 MG: 100 SOLUTION ORAL at 10:24

## 2023-12-09 RX ADMIN — TOPIRAMATE 250 MG: 100 TABLET, FILM COATED ORAL at 21:44

## 2023-12-09 RX ADMIN — SODIUM CHLORIDE 500 ML: 0.9 INJECTION, SOLUTION INTRAVENOUS at 02:35

## 2023-12-09 RX ADMIN — TOPIRAMATE 250 MG: 100 TABLET, FILM COATED ORAL at 10:20

## 2023-12-09 RX ADMIN — CLOBAZAM 10 MG: 10 TABLET ORAL at 21:44

## 2023-12-09 RX ADMIN — Medication 1000 UNITS: at 10:21

## 2023-12-09 RX ADMIN — HEPARIN SODIUM 5000 UNITS: 5000 INJECTION INTRAVENOUS; SUBCUTANEOUS at 05:41

## 2023-12-09 RX ADMIN — HEPARIN SODIUM 5000 UNITS: 5000 INJECTION INTRAVENOUS; SUBCUTANEOUS at 17:21

## 2023-12-09 RX ADMIN — HEPARIN SODIUM 5000 UNITS: 5000 INJECTION INTRAVENOUS; SUBCUTANEOUS at 21:45

## 2023-12-09 RX ADMIN — LACOSAMIDE 200 MG: 50 TABLET, FILM COATED ORAL at 10:21

## 2023-12-09 RX ADMIN — RUFINAMIDE 1600 MG: 40 SUSPENSION ORAL at 10:22

## 2023-12-09 RX ADMIN — CANNABIDIOL 400 MG: 100 SOLUTION ORAL at 17:21

## 2023-12-09 RX ADMIN — BRIVARACETAM 50 MG: 10 SOLUTION ORAL at 17:22

## 2023-12-09 RX ADMIN — BRIVARACETAM 50 MG: 10 SOLUTION ORAL at 10:24

## 2023-12-09 RX ADMIN — LACOSAMIDE 200 MG: 50 TABLET, FILM COATED ORAL at 21:44

## 2023-12-09 RX ADMIN — RUFINAMIDE 1600 MG: 40 SUSPENSION ORAL at 21:45
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
## 2023-12-09 NOTE — ASSESSMENT & PLAN NOTE
Status post brain stimulator, magnet delivered from patient's facility  On multiple seizure medications via G-tube with patient presenting with dislodged G-tube initially having breakthrough cluster seizures requiring IV AED therapy initially  Currently G-tube was replaced on 12/8/2023 and her medications were obtained with patient's seizures now controlled  Appreciate neurology input

## 2023-12-09 NOTE — ASSESSMENT & PLAN NOTE
This is a 42-year-old female patient with history of anoxic brain injury, intractable seizure disorder/Lennox Gestalt syndrome who presented with dislodged G-tube  Receives nutrition and medications via G-tube  Appreciate IR input, status post G-tube replacement 12/8/2023  Resume all medications and nutrition  Dissipate discharge back to facility tomorrow
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
## 2023-12-09 NOTE — PLAN OF CARE
Problem: Potential for Falls  Goal: Patient will remain free of falls  Description: INTERVENTIONS:  - Educate patient/family on patient safety including physical limitations  - Instruct patient to call for assistance with activity   - Consult OT/PT to assist with strengthening/mobility   - Keep Call bell within reach  - Keep bed low and locked with side rails adjusted as appropriate  - Keep care items and personal belongings within reach  - Initiate and maintain comfort rounds  - Make Fall Risk Sign visible to staff  - Offer Toileting every 2 Hours, in advance of need  - Initiate/Maintain bed/ chair alarm  - Apply yellow socks and bracelet for high fall risk patients  - Consider moving patient to room near nurses station  Outcome: Progressing     Problem: PAIN - ADULT  Goal: Verbalizes/displays adequate comfort level or baseline comfort level  Description: Interventions:  - Encourage patient to monitor pain and request assistance  - Assess pain using appropriate pain scale  - Administer analgesics based on type and severity of pain and evaluate response  - Implement non-pharmacological measures as appropriate and evaluate response  - Consider cultural and social influences on pain and pain management  - Notify physician/advanced practitioner if interventions unsuccessful or patient reports new pain  Outcome: Progressing     Problem: INFECTION - ADULT  Goal: Absence or prevention of progression during hospitalization  Description: INTERVENTIONS:  - Assess and monitor for signs and symptoms of infection  - Monitor lab/diagnostic results  - Monitor all insertion sites, i.e. indwelling lines, tubes, and drains  - Monitor endotracheal if appropriate and nasal secretions for changes in amount and color  - [place] appropriate cooling/warming therapies per order  - Administer medications as ordered  - Instruct and encourage patient and family to use good hand hygiene technique  - Identify and instruct in appropriate isolation precautions for identified infection/condition  Outcome: Progressing  Goal: Absence of fever/infection during neutropenic period  Description: INTERVENTIONS:  - Monitor WBC    Outcome: Progressing     Problem: SAFETY ADULT  Goal: Patient will remain free of falls  Description: INTERVENTIONS:  - Educate patient/family on patient safety including physical limitations  - Instruct patient to call for assistance with activity   - Consult OT/PT to assist with strengthening/mobility   - Keep Call bell within reach  - Keep bed low and locked with side rails adjusted as appropriate  - Keep care items and personal belongings within reach  - Initiate and maintain comfort rounds  - Make Fall Risk Sign visible to staff  - Offer Toileting every 2 Hours, in advance of need  - Initiate/Maintain bed/ chair alarm  - Apply yellow socks and bracelet for high fall risk patients  - Consider moving patient to room near nurses station  Outcome: Progressing  Goal: Maintain or return to baseline ADL function  Description: INTERVENTIONS:  -  Assess patient's ability to carry out ADLs; assess patient's baseline for ADL function and identify physical deficits which impact ability to perform ADLs (bathing, care of mouth/teeth, toileting, grooming, dressing, etc.)  - Assess/evaluate cause of self-care deficits   - Assess range of motion  - Assess patient's mobility; develop plan if impaired  - Assess patient's need for assistive devices and provide as appropriate  - Encourage maximum independence but intervene and supervise when necessary  - Involve family in performance of ADLs  - Assess for home care needs following discharge   - Consider OT consult to assist with ADL evaluation and planning for discharge  - Provide patient education as appropriate  Outcome: Progressing  Goal: Maintains/Returns to pre admission functional level  Description: INTERVENTIONS:  - Perform AM-PAC 6 Click Basic Mobility/ Daily Activity assessment daily.   - Set and communicate daily mobility goal to care team and patient/family/caregiver. - Collaborate with rehabilitation services on mobility goals if consulted  - Perform Range of Motion 3-4 times a day. - Reposition patient every 2 hours. - Dangle patient 3 times a day  - Stand patient 3 times a day  - Ambulate patient 3 times a day  - Out of bed to chair 3 times a day   - Out of bed for meals 3 times a day  - Out of bed for toileting  - Record patient progress and toleration of activity level   Outcome: Progressing     Problem: DISCHARGE PLANNING  Goal: Discharge to home or other facility with appropriate resources  Description: INTERVENTIONS:  - Identify barriers to discharge w/patient and caregiver  - Arrange for needed discharge resources and transportation as appropriate  - Identify discharge learning needs (meds, wound care, etc.)  - Arrange for interpretive services to assist at discharge as needed  - Refer to Case Management Department for coordinating discharge planning if the patient needs post-hospital services based on physician/advanced practitioner order or complex needs related to functional status, cognitive ability, or social support system  Outcome: Progressing     Problem: Knowledge Deficit  Goal: Patient/family/caregiver demonstrates understanding of disease process, treatment plan, medications, and discharge instructions  Description: Complete learning assessment and assess knowledge base.   Interventions:  - Provide teaching at level of understanding  - Provide teaching via preferred learning methods  Outcome: Progressing     Problem: NEUROSENSORY - ADULT  Goal: Achieves stable or improved neurological status  Description: INTERVENTIONS  - Monitor and report changes in neurological status  - Monitor vital signs such as temperature, blood pressure, glucose, and any other labs ordered   - Initiate measures to prevent increased intracranial pressure  - Monitor for seizure activity and implement precautions if appropriate      Outcome: Progressing  Goal: Remains free of injury related to seizures activity  Description: INTERVENTIONS  - Maintain airway, patient safety  and administer oxygen as ordered  - Monitor patient for seizure activity, document and report duration and description of seizure to physician/advanced practitioner  - If seizure occurs,  ensure patient safety during seizure  - Reorient patient post seizure  - Seizure pads on all 4 side rails  - Instruct patient/family to notify RN of any seizure activity including if an aura is experienced  - Instruct patient/family to call for assistance with activity based on nursing assessment  - Administer anti-seizure medications if ordered    Outcome: Progressing  Goal: Achieves maximal functionality and self care  Description: INTERVENTIONS  - Monitor swallowing and airway patency with patient fatigue and changes in neurological status  - Encourage and assist patient to increase activity and self care.    - Encourage visually impaired, hearing impaired and aphasic patients to use assistive/communication devices  Outcome: Progressing     Problem: METABOLIC, FLUID AND ELECTROLYTES - ADULT  Goal: Electrolytes maintained within normal limits  Description: INTERVENTIONS:  - Monitor labs and assess patient for signs and symptoms of electrolyte imbalances  - Administer electrolyte replacement as ordered  - Monitor response to electrolyte replacements, including repeat lab results as appropriate  - Instruct patient on fluid and nutrition as appropriate  Outcome: Progressing  Goal: Fluid balance maintained  Description: INTERVENTIONS:  - Monitor labs   - Monitor I/O and WT  - Instruct patient on fluid and nutrition as appropriate  - Assess for signs & symptoms of volume excess or deficit  Outcome: Progressing     Problem: Prexisting or High Potential for Compromised Skin Integrity  Goal: Skin integrity is maintained or improved  Description: INTERVENTIONS:  - Identify patients at risk for skin breakdown  - Assess and monitor skin integrity  - Assess and monitor nutrition and hydration status  - Monitor labs   - Assess for incontinence   - Turn and reposition patient  - Assist with mobility/ambulation  - Relieve pressure over bony prominences  - Avoid friction and shearing  - Provide appropriate hygiene as needed including keeping skin clean and dry  - Evaluate need for skin moisturizer/barrier cream  - Collaborate with interdisciplinary team   - Patient/family teaching  - Consider wound care consult   Outcome: Progressing

## 2023-12-10 VITALS
WEIGHT: 100.31 LBS | RESPIRATION RATE: 18 BRPM | HEIGHT: 60 IN | OXYGEN SATURATION: 96 % | SYSTOLIC BLOOD PRESSURE: 90 MMHG | BODY MASS INDEX: 19.69 KG/M2 | DIASTOLIC BLOOD PRESSURE: 54 MMHG | TEMPERATURE: 99 F | HEART RATE: 79 BPM

## 2023-12-10 LAB
ANION GAP SERPL CALCULATED.3IONS-SCNC: 7 MMOL/L
BASOPHILS # BLD AUTO: 0.05 THOUSANDS/ÂΜL (ref 0–0.1)
BASOPHILS NFR BLD AUTO: 1 % (ref 0–1)
BUN SERPL-MCNC: 11 MG/DL (ref 5–25)
CALCIUM SERPL-MCNC: 8.9 MG/DL (ref 8.4–10.2)
CHLORIDE SERPL-SCNC: 112 MMOL/L (ref 96–108)
CO2 SERPL-SCNC: 19 MMOL/L (ref 21–32)
CREAT SERPL-MCNC: 0.4 MG/DL (ref 0.6–1.3)
EOSINOPHIL # BLD AUTO: 0.22 THOUSAND/ÂΜL (ref 0–0.61)
EOSINOPHIL NFR BLD AUTO: 3 % (ref 0–6)
ERYTHROCYTE [DISTWIDTH] IN BLOOD BY AUTOMATED COUNT: 13.1 % (ref 11.6–15.1)
GFR SERPL CREATININE-BSD FRML MDRD: 128 ML/MIN/1.73SQ M
GLUCOSE SERPL-MCNC: 146 MG/DL (ref 65–140)
HCT VFR BLD AUTO: 34.1 % (ref 34.8–46.1)
HGB BLD-MCNC: 11 G/DL (ref 11.5–15.4)
IMM GRANULOCYTES # BLD AUTO: 0.02 THOUSAND/UL (ref 0–0.2)
IMM GRANULOCYTES NFR BLD AUTO: 0 % (ref 0–2)
LYMPHOCYTES # BLD AUTO: 2.23 THOUSANDS/ÂΜL (ref 0.6–4.47)
LYMPHOCYTES NFR BLD AUTO: 29 % (ref 14–44)
MCH RBC QN AUTO: 31.7 PG (ref 26.8–34.3)
MCHC RBC AUTO-ENTMCNC: 32.3 G/DL (ref 31.4–37.4)
MCV RBC AUTO: 98 FL (ref 82–98)
MONOCYTES # BLD AUTO: 0.64 THOUSAND/ÂΜL (ref 0.17–1.22)
MONOCYTES NFR BLD AUTO: 8 % (ref 4–12)
MRSA NOSE QL CULT: NORMAL
NEUTROPHILS # BLD AUTO: 4.55 THOUSANDS/ÂΜL (ref 1.85–7.62)
NEUTS SEG NFR BLD AUTO: 59 % (ref 43–75)
NRBC BLD AUTO-RTO: 0 /100 WBCS
PLATELET # BLD AUTO: 174 THOUSANDS/UL (ref 149–390)
PMV BLD AUTO: 11.7 FL (ref 8.9–12.7)
POTASSIUM SERPL-SCNC: 3.7 MMOL/L (ref 3.5–5.3)
RBC # BLD AUTO: 3.47 MILLION/UL (ref 3.81–5.12)
SODIUM SERPL-SCNC: 138 MMOL/L (ref 135–147)
WBC # BLD AUTO: 7.71 THOUSAND/UL (ref 4.31–10.16)

## 2023-12-10 PROCEDURE — 99239 HOSP IP/OBS DSCHRG MGMT >30: CPT | Performed by: FAMILY MEDICINE

## 2023-12-10 PROCEDURE — 85025 COMPLETE CBC W/AUTO DIFF WBC: CPT | Performed by: FAMILY MEDICINE

## 2023-12-10 PROCEDURE — 80048 BASIC METABOLIC PNL TOTAL CA: CPT | Performed by: FAMILY MEDICINE

## 2023-12-10 RX ADMIN — SODIUM CHLORIDE 500 ML: 0.9 INJECTION, SOLUTION INTRAVENOUS at 02:06

## 2023-12-10 RX ADMIN — BRIVARACETAM 50 MG: 10 SOLUTION ORAL at 11:00

## 2023-12-10 RX ADMIN — CANNABIDIOL 400 MG: 100 SOLUTION ORAL at 11:01

## 2023-12-10 RX ADMIN — TOPIRAMATE 250 MG: 100 TABLET, FILM COATED ORAL at 10:22

## 2023-12-10 RX ADMIN — Medication 1000 UNITS: at 10:22

## 2023-12-10 RX ADMIN — RUFINAMIDE 1600 MG: 40 SUSPENSION ORAL at 11:02

## 2023-12-10 RX ADMIN — HEPARIN SODIUM 5000 UNITS: 5000 INJECTION INTRAVENOUS; SUBCUTANEOUS at 05:14

## 2023-12-10 RX ADMIN — LACOSAMIDE 200 MG: 50 TABLET, FILM COATED ORAL at 10:22

## 2023-12-10 RX ADMIN — SODIUM CHLORIDE 500 ML: 0.9 INJECTION, SOLUTION INTRAVENOUS at 05:23

## 2023-12-10 NOTE — ASSESSMENT & PLAN NOTE
This is a 55-year-old female patient with history of anoxic brain injury, intractable seizure disorder/Lennox Gestalt syndrome who presented with dislodged G-tube  Receives nutrition and medications via G-tube  Appreciate IR input, status post G-tube replacement 12/8/2023  Resume all medications and nutrition  Discharge back to facility

## 2023-12-10 NOTE — ASSESSMENT & PLAN NOTE
Status post brain stimulator, magnet delivered from patient's facility  On multiple seizure medications via G-tube with patient presenting with dislodged G-tube initially having breakthrough cluster seizures requiring IV AED therapy initially  Currently G-tube was replaced on 12/8/2023 and her medications were obtained with patient's seizures now controlled  Appreciate neurology input  Discharge back to facility
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
## 2023-12-10 NOTE — CASE MANAGEMENT
Case Management Discharge Planning Note    Patient name Shaquille Eldridge  Location / MRN [id number]  : 1980 Date 12/10/2023       Current Admission Date: 2023  Current Admission Diagnosis:Dislodged gastrostomy tube   Patient Active Problem List    Diagnosis Date Noted    PEG tube malfunction ([address]) 2020    Labyrinthine disorder 03/10/2020    Intellectual disability with epilepsy ([address]) 12/10/2019    Fatty infiltration of liver 2019    Moderate protein-calorie malnutrition ([address]) 2019    Dislodged gastrostomy tube 2019    S/P placement of VNS (vagus nerve stimulation) device 2019    Breast mass 01/15/2019    Sedated due to multiple medications 01/10/2019    Hypophosphatemia 2018    Right atrial enlargement 10/13/2018    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    Skin breakdown 10/09/2018    Incontinence 2018    Low blood pressure 2018    Somnolence 2018    Intractable epilepsy with status epilepticus ([address]) 2018    Underweight 2017    Labial cyst 2017    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    Osteoporosis 2013    Onychomycosis 2013    Hyperkeratosis 2013    Cerebral anoxic injury ([address]) 2013      LOS (days): 2  Geometric Mean LOS (GMLOS) (days): 2.70  Days to GMLOS:0.8     OBJECTIVE:  Risk of Unplanned Readmission Score: 8.65         Current admission status: Inpatient   Preferred Pharmacy:   [address] [id number], [address] [facility] [address] [place], [address] [address]  Phone: [phone] Fax: [phone]    [facility] [address]  [address] [facility]  [address] [address]  Phone: [phone] Fax: [phone]    [facility], [address]  [address] [address]  Phone: [phone] Fax: [phone] [facility] [address] [facility]  [phone] [address] [address]  Phone: [phone] Fax: [phone]    [facility], [address] [place] [address] [address]   [address] [address]  Phone: [phone] Fax: [phone]    Primary Care Provider: Christel Shone, DO    Primary Insurance: MEDICARE  Secondary Insurance: [address] returning to [place] today. Spoke with Jean-Pierre Mansfield at facility and she is aware of  time of 1300. Will fax AVS to [phone].
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
## 2023-12-10 NOTE — PROGRESS NOTES
Report called to [facility], report taken by Martha Gaitan. Patient will be sent back with her Vagal Nerve Stimulator and numerous supplies for patient G-tube - Martha Gaitan was made aware in report. Patient to be sent back with allevyn pads applied to her bl heels and buttock - Martha Gaitan made aware.

## 2023-12-11 NOTE — TELEPHONE ENCOUNTER
Patient admitted 12/7-12/10, IPC by neurology while inpatient. Apparently had dislodged G-tube and was not getting her meds.

## 2024-01-25 ENCOUNTER — OFFICE VISIT (OUTPATIENT)
Dept: NEUROLOGY | Facility: CLINIC | Age: 44
End: 2024-01-25
Payer: MEDICARE

## 2024-01-25 ENCOUNTER — TELEPHONE (OUTPATIENT)
Dept: NEUROLOGY | Facility: CLINIC | Age: 44
End: 2024-01-25

## 2024-01-25 VITALS — WEIGHT: 95.6 LBS | RESPIRATION RATE: 14 BRPM | BODY MASS INDEX: 18.77 KG/M2 | TEMPERATURE: 97.9 F | HEIGHT: 60 IN

## 2024-01-25 DIAGNOSIS — G40.813 INTRACTABLE LENNOX-GASTAUT SYNDROME WITH STATUS EPILEPTICUS (HCC): ICD-10-CM

## 2024-01-25 DIAGNOSIS — G40.814 INTRACTABLE LENNOX-GASTAUT SYNDROME WITHOUT STATUS EPILEPTICUS (HCC): Primary | ICD-10-CM

## 2024-01-25 PROBLEM — S02.5XXA FRACTURE OF TOOTH: Status: ACTIVE | Noted: 2024-01-25

## 2024-01-25 PROCEDURE — 95976 ALYS SMPL CN NPGT PRGRMG: CPT | Performed by: PSYCHIATRY & NEUROLOGY

## 2024-01-25 PROCEDURE — 99215 OFFICE O/P EST HI 40 MIN: CPT | Performed by: PSYCHIATRY & NEUROLOGY

## 2024-01-25 RX ORDER — BRIVARACETAM 10 MG/ML
SOLUTION ORAL
COMMUNITY
Start: 2023-12-21

## 2024-01-25 RX ORDER — CLOBAZAM 10 MG/1
TABLET ORAL
Qty: 30 TABLET | Refills: 5 | Status: SHIPPED | OUTPATIENT
Start: 2024-01-25

## 2024-01-25 RX ORDER — LACOSAMIDE 200 MG/1
TABLET ORAL
Qty: 60 TABLET | Refills: 5 | Status: SHIPPED | OUTPATIENT
Start: 2024-01-25

## 2024-01-25 RX ORDER — TOPIRAMATE 50 MG/1
250 TABLET, FILM COATED ORAL EVERY 12 HOURS SCHEDULED
Qty: 300 TABLET | Refills: 5 | Status: SHIPPED | OUTPATIENT
Start: 2024-01-25

## 2024-01-25 RX ORDER — DIAZEPAM ORAL SOLUTION (CONCENTRATE) 5 MG/ML
SOLUTION ORAL
Qty: 30 ML | Refills: 1 | Status: SHIPPED | OUTPATIENT
Start: 2024-01-25

## 2024-01-25 RX ORDER — RUFINAMIDE 400 MG/1
1600 TABLET, FILM COATED ORAL EVERY 12 HOURS
Qty: 240 TABLET | Refills: 5 | Status: SHIPPED | OUTPATIENT
Start: 2024-01-25

## 2024-01-25 NOTE — PATIENT INSTRUCTIONS
Plan:   Medications are given by PEG tube  1 - Increase Epidiolex 100mg/mL 5.0 mL (500mg) every 12 hours  2 - Continue Briviact 50mg tab one tabe every 12 hours  3 - Continue with Topiramate 50mg tabs give 5 tabs (250mg) every 12 hours  4 - continue Banzel 400mg give 4 tabs every 12 hours  5 - Continue Clobazam 10mg tab give one tab at bedtime  6 - Continue Lacosamide 200mg tab one tab every 12 hours.  7 - give diazepam 5mg/mL 1 mL as needed for seizure last more than 3 minutes and repeat second dose for seizure lasting more than 10 minutes  8 - Call the office if there is increase in seizure frequency  9 - please check CMP, topiramate, lacosamide, clobazam, and rufinamide levels.  9 - follow-up in 3 months with advanced practitioner

## 2024-01-25 NOTE — PROGRESS NOTES
[facility]  Patient's Name: Maira Bull   Patient's : 1980   Visit Type: follow-up  Referring MD / PCP:  Doe Miller DO     Assessment:  Ms. Maira Bull is a 43 y.o. woman with Intractable LGS, frequent tonic seizures (especially during sleep), and intermittent witnessed tonic seizures.  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  She likely have more frequent tonic seizures during sleep, based on prior continuous EEG monitoring studies.  Her EEG studies show both generalized onset and potential risk for focal onset seizures.  She has variable degree of wakefulness and non-standard sleep cycle.  She had been doing better with clobazam and more recent clusters could have been triggered by weaning of clobazam.  Will increase Epidiolex dose.  There is low probability that further medications will improve current seizure control.    She will likely need a new VNS generator placed due to higher output current which can deplete the battery sooner.     Plan:   Medications are given by PEG tube  1 - Increase Epidiolex 100mg/mL 5.0 mL (500mg) every 12 hours  2 - Continue Briviact 50mg tab one tabe every 12 hours  3 - Continue with Topiramate 50mg tabs give 5 tabs (250mg) every 12 hours  4 - continue Banzel 400mg give 4 tabs every 12 hours  5 - Continue Clobazam 10mg tab give one tab at bedtime  6 - Continue Lacosamide 200mg tab one tab every 12 hours.  7 - give diazepam 5mg/mL 1 mL as needed for seizure last more than 3 minutes and repeat second dose for seizure lasting more than 10 minutes  8 - Call the office if there is increase in seizure frequency  9 - please check CMP, topiramate, lacosamide, clobazam, and rufinamide levels.  10 - follow-up in 3 months with advanced practitioner    If VNS battery is < 25% she will need a referral to neurosurgery for battery replacement.      Problem List Items Addressed This Visit          Nervous and  Auditory    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HCC) - Primary    Relevant Medications    Briviact 10 MG/ML SOLN oral solution    brivaracetam (BRIVIACT) 50 MG TABS tablet    cloBAZam (ONFI) 10 MG tablet    lacosamide (VIMPAT) 200 mg tablet    rufinamide (BANZEL) 400 mg tablet    topiramate (TOPAMAX) 50 MG tablet    diazepam (VALIUM) 5 MG/ML solution    Other Relevant Orders    Comprehensive metabolic panel    RUFINAMIDE (BANZEL) Level    Topiramate level    Lacosamide    Clobazam       Chief Complaint:    Chief Complaint    Follow-up; Seizures         HPI:    Maira Bull is a 43 y.o.female here for follow-up evaluation of intractable epilepsy in the setting of LGS.      Interval History 1/25/2024  She was last seen by Li Vance on 8/16/2023 - there were 2 documented seizrues, body tenses up, face read, drooling.  Seizures involve tense body, arms flexed, face red, and labored breathing.    No changes were made.    Since her last visit, she had three seizures in December (12/3 and 12/25).  She was admitted to hospital on 12/7 due to dislodged G-tube, she was not able to get her antiseizure medications while she did not have a G-tube, so she had a cluster of 6-7 generalized tonic clonic seizures (upper body stiffening and shaking and unresponsiveness).  She had an EEG study that showed multiple tonic seizures.    [place] -   She had seizures on 11/13 needed diazepam to stop seizure.  She had seizure-like activiey on 10/29 2 episodes within 3 minute (lasted 10 seconds, tense and tight, with repetitive shaking of arms and face turning red).    AED/side effects/compliance:  Banzel 400mg give 4 tabs twice a day   Topiramate 250mg tab twice a day   Epidiolex 400mg twice a day  Briviact 10mg/mL 50mg twice a day  Lacosamide 200-200  Onfi 4mL at bedtime    Event/Seizure semiology:  Seizure semiology:  She was described to have drop attacks or spells with grunting sounds and falling to the floor.      Her nurse commented on  episodes of spasms or myoclonic jerking of the right arm.    Generalized convulsions  Tonic seizures of eyes rolling back (mostly during sleep)    Prior Epilepsy History:  Collective epilepsy history 11/6/2014 was previously evaluated by Dr. Haprer including a hospitalization in February 2013 for status epilepticus, in the setting of missed doses of AEDs due to refusal to eat. She subsequently required anesthetic induced coma to stop her seizures and PEG tube was placed. She was seen almost every month for management of her seizures up until November 2013. She has medically refractory seizures and had a VNS placed possibly in the early 2000s and a generator changed in 2011. Unknown AEDs that were tried but felbamate is listed as an allergy. In 2011, her AEDs were clonazepam, levetiracetam, Depakote and Lamictal. Dr. Harper had started Banzel in 2011. In 2012, Onfi was added with the reduction of clonazepam. There were difficulty with documenting seizure frequency; but seizures were no longer daily occurrences but there were clusters of seizures. There would be good periods and bad periods of seizure frequency. Dr. Harper had been increasing the duty cycle of the VNS over the course of 2012 to 2013. Sometime between August 2013 and October 2013, topiramate was introduced.  She was in status epilepticus in 2013, she was on Keppra, Banzel, Onfi, and Lamictal. She had an EEG at some point that showed multifocal spike-wave and background attenuation. She has intermittent agitation and day time somnolence. When she was hospitalized for status epilepticus, she was started on methylphenidate to help wake her up.     Intake history November 2014:  VPA level 127.  Her Valproic Acid dose was previously 250mg q6hrs based on Dr. Harper's notes. Since July 2014, she has been receiving VPA 500mg q6hrs. She has not been hospitalized since September 2013. She was previously ambulatory with therapy. She spends most of her time sleeping for  the past couple of months.  She has also been receiving lactulose for elevated ammonia levels.   (older drugs VPA, LVT, LMG, newer drugs added on since 2011 RFN, Onfi, TPM)     Summary of 2015:  We decreased VPA from TDD 2000mg to 1000mg with increased agitation/combativeness, alternating days of exhaustion (no behavioral specialist has been involved). are randomly reported by multiple staff members. Seizures are typically reported by CNA's or her one to one on the 3PM to 11PM shift. Seizures are described a brief events of eyes rolling back and arms going up in the air, followed by hair pulling or slapping herself. These seizures were reported as either sporadic or every other day episodes.  There have been no documented grand mal seizures or drop attacks. She refuses to wear safety helmet, rips at her hair, and attempts to flip herself out of her chair and bed.  Maira can walk briefly but walks on her toes, she frequently will allow herself to fall down when she does not want to walk.  It does not appear that methylphenidate has been useful in maintaining her level of wakefulness during the day.    VNS generator change on 11/3/2015. The Model 103 (serial #[id number]) was replaced with Isentio Model 105, serial #[id number]. Weaned off of levetiracetam due to multiple medications and agitation; by the end of 2016, she was down to -500.      Summary of 2016:  Started with RFN [id number], -250, CLB 0.5-0.5-25,  QID, -200 attempted to wean off of LEV and reduced the dose of VPA given that there were no reports of seizures and she was sedated, along with elevated ammonia levels.  It was unclear as to how effective LEV was for her seizure control.  When she missed a dose of TPM in September 2016, she had multiple seizures.  We attempted to compensate for increase in seizures by increasing Onfi to 20-20; however, it seems that it made her more sedated.     Summary of 2017  RFN [id number], -250,  Onfi 20-20, -200,  q8hrs. She has been more somnolent.  She continues to have tonic seizures when she is asleep, eyes rolling body, arms will tense up with some twitching, last a few seconds, but will recur. There are no seizures during the daytime.  We attempted to wean off of VPA due to ammonia / somnolence; but there was an increase in tonic seizures (tonic stiffness, eyes rolling upwards, and subtle arm (right?) jerking).     Hospital admission 10/10-10/26/2017, due to seizures in setting of infection, was put on continuous video EEG monitoring to determine her seizure type, seizure frequency, and rapid medication adjustments.  Rapid med changes: d/c'ed lamotrigine, restarted levetiracetam, attempted discontinuation of valproic acid (more seizures, so restarted), attempted reduction in Onfi, and starting Fycompa.  The patient's seizures were mostly based during sleep, tonic seizures.      Summary of 2018  Started with RFN [id number], PER 8, LEV 1160-2320, CLB 5-15, -250,  TID.  Due to excessive somnolence Onfi was weaned off.  She was on continuous EEG monitoring when she was in hospital for PNA that showed very frequent tonic seizures during sleep.  VPA is causing hyperammoniemia.  She has had more admissions for somnolence / lethargy, VPA was reduced for transaminitis.  She was tested for inborn error of metabolism with plasma amino acid, urine organic acid, and acylcarnitine levels.  There were nonspecific elevations in some amino acid or organic acid but no pattern consistent with a specific inborn error of metabolism.  Elevated carnitine level was consistent with supplementation.  Higher doses of phenobarbital may also contribute to sedation, phenobarbital was reduced to 20mg but on follow-up she was on 20mL of oral solution (80mg / day).    There is variable reporting in the frequency of tonic seizures (these are the eyes are rolling back and shaking, then stop, then happen again  in 10-15 minutes).  Her level of alertness is also variable with erratic sleep cycle.    Summary 2019  We started the year with  TID, RFN [id number], -200, LEV 3021-7182, PER 10, PB 40 qHS.  In December 2018, Hospital admission for septic shock and aspiration PNA.  She had an EEG that captured recurrent tonic seizures during sleep but this is consistently found in all of her other EEG studies.  Another hospital admission January 2019 for recurrent convulsive seizures.  Due to sedation Phenobarbital was weaned off and Fycompa was increased to 10mg at bedtime.  TPM increased to 250-250.  Tried to wean off of VPA due to ammonia levels.  We started Epidiolex in the Spring 2019, which seems to have improved seizure control.    January 2019 - Her CNA reports that Maira is no longer Maira, she is essentially bed ridden.  She does not eat and does not walk.  Maira usually participate in activities, walking, and eating food (if she was at a [employer] she would be able to eat a hamburger without difficulty).  I was able to speak to her father Oneil and he reported that he too saw a significant decline in mental status.  There was a phone call from [facility] reporting an increase in seizure activity (brief episodes tonic-myoclonic jerking); but subsequent reports did not notice an increase in activity.      Weaned off of phenobarbital, suspecting that it was causing elevated transaminases.  There is variable periods of wakefulness and alertness; seizures are reported sporadically, sometimes she seems to have clusters of seizures (during an office visit there were about 8 tonic seizures lasting 15-20 seconds).  We weaned her off of valproic acid and increased Epidiolex.    June 2019, hospitalized for status epilepticus (multiple clinical seizures, every 5-6 minutes, associated with apnea).  Continuous EEG monitoring showed very frequent 15-30 seconds tonic seizures , whether this is different from her  baseline is difficult to determine.  Since she was on continuous EEG monitoring a number of medication trials were given.  At one point it seems that lorazepam and more levetiracetam had improved seizures.  Her tube feed was adjust to more protein and lower carbohydrates.  2019 - multiple phone calls regarding recurrent seizures (generalized shaking, tonic body rigidity and clonic activity)    Summary 2020  RUF [id number], -250, LEV 1000 q8hr, -400, JESSICA 50-50, CLB 5 qHS.  Replacement of her VNS in December 2019, went from M105 to  model.  Seizure reporting had been sporadic.  Despite medication adjustments, there has been no improvement in degree of wakefulness, nonverbal, calls out at times.  She does not do much.  Transitioned LEV to Brivaracetam.  Increased clobazam to 10mg at bedtime on 5/28/2020.  Unclear if there has been improvement in seizure frequency; but there were no admissions to hospital for seizures.    Summary 2021  RUF [id number], -250, -400, JESSICA 75-75, CLB 5-10.  She was admitted to Hospital on 2/2/2021 for multiple seizures in one day.  Onfi was increased with an extra 5mg tab in AM.  With the increase in Onfi, there has been no report of excessive sedation.  She continues to have stretches of non-24 hour sleep cycle.  She has periods when she appears to be active, smiling and laughter and periods when she has no interactions with those around her.  Her seizures are very difficult to notice, unless someone is with her.  We had tried to reduce her Brivaracetam dose as there was no clear improvement at higher dose and she has been losing weight.  We started Vimpat at the end of the year.    Summary 2022  RUF [id number], -250, -400, BRV 50-50, CLB 5-10.    Often seems to be sleepy.  She is sometimes on a play mat, she mostly lays there, sometimes she plays with her toys.  She may scream or yell throughout the day.  One time she was able to say her name and  clap her hands and laugh at something.  She has moments of increased alertness and awareness.    She continues to have occasional witnessed seizures (body and head rigidity, eyes rolling up, sometimes mild twitching of the arms). The last time a rescue medication was needed was back in 2021.    We tried to reduce her dose of clobazam to reduce sedation and replace it with lacosamide.    Summary   RUF [id number], -250, -400, BRV 50-50, CLB 10, -150.  She continues to have episodic seizures.  Seizures involved whole body shaking, eyes fixed, drooling.  Her clobazam was increased when there was a cluster of seizures.  She has variable degree of wakefulness, sometimes she is wild and screaming, sometimes she is lethargic.  Most of her seizures are the tonic seizure type.  No report of tonic-clonic seizures.      Special Features  Status epilepticus: yes  Self Injury Seizures: No  Precipitating Factors: menstrual cycle??    Epilepsy Risk Factors:  Abnormal pregnancy: unknown  Abnormal birth/: unknown  Abnormal Development: unknown developmental history  Febrile seizures, simple: unknown  Febrile seizures, complex: unknown  CNS infection: No  Mental retardation: Yes  Cerebral palsy: Yes  Head injury (moderate/severe): possibly anoxic brain injury  CNS neoplasm: No  CNS malformation: No  Neurosurgical procedure: No  Stroke: No  Alcohol abuse: No  Drug abuse: No  Family history Sz/epilepsy: unknown    Prior AEDs:  Rufinamide  Clobazam (excessive sedation)  Clonazepam  Levetiracetam (unclear if beneficial)  Lamotrigine (unclear if beneficial)  Topiramate (missed doses caused breakthrough convulsive type of seizures)  Valproate (elevated ammonia levels)  Fycompa (excess sedation)  Felbamate (listed as a drug allergy)  Phenobarbital (contributing to sedation)  Epidiolex (seems to help the most)  Brivaracetam  Lacosamide    Prior workup:  x    Imaging:  CT head 2013, 2018  No  acute intracranial pathology    3/20/2019  MRI brain w/wo contrast  Normal MR brain study  Symmetric hippocampal formations    EEGs:  Continuous video EEG monitoring 10/13 - 10/15/2017  Frequent generalized spike/polyspike-slow wave complexes during sleep  At times there are independent right more than left midtemporal spikes and bitemporal spikes    Innumerable generalized tonic seizures during sleep, generalized 1 Hz period discharges, that would become continuous for 30 seconds or generalized rhythmic alpha-beta discharges, associated with patient becoming rigid, tonic posturing with arms elevated and tense with subtle jerking of the upper body, eyes opening with upward deviation.  Ictal patterns:  1 - Seconds of diffuse electrodecrement then paroxysmal fast activity followed by 2Hz spike wave discharges (clinically accompanied by posturing of the arms, then clonic jerking)    Continuous video EEG monitoring 10/18 - 10/25/2017  Diffuse background slowing with bursts of arrhythmic generalized spike wave discharges, with shifting lateralization, and independent left and  right temporal spikes  Mild eyelid myoclonia associated with brief bursts of 2-2.5 Hz generalized discharges  Tonic seizures with eelctrodecrement  There were innumerable tonic seizures; however by 10/25/2017 the frequency of these seizures did decrease in frequency.    Continuous video EEG monitoring 12/1-12/5/2017  There are innumerable tonic seizures that are generally less than one minute in duration (30-40 seconds), these consists of subtle eye opening and tonic stiffening of arms, if longer then whole body stiffening with clonic jerking movements.  These almost always occur exclusively out of sleep.  These consist of 2-2.5 Hz generalized spike-slow wave complexes with generalized attenuation of activity (desynchronization) or paroxysmal fast activity.  Per 24 hours count of seizures could be .    12/19/2018 routine study  Frequent  recurrent tonic seizures associated with paroxysmal generalized fast activity then generalized rhythmic discharges associated with eyes upward deviation, and myoclonic/clonic jerking of the upper body, excess beta activity.    6/28-7/3/2019 continuous video EEG monitoring  Frequent clusters of tonic seizures (body rigidity, head flexions, eyes go up with dysconjugate gaze, and clonic activity of the arms for a few seconds), these are associated with GPFA and rhythmic spike-slow waves.  There are also bilateral temporal focal epileptiform discharges.    Labs:  Component      Latest Ref Rng 12/8/2023 12/10/2023   WBC      4.31 - 10.16 Thousand/uL  7.71    RBC      3.81 - 5.12 Million/uL  3.47 (L)    Hemoglobin      11.5 - 15.4 g/dL  11.0 (L)    HCT      34.8 - 46.1 %  34.1 (L)    Platelet Count      149 - 390 Thousands/uL  174    Sodium      135 - 147 mmol/L 138  138    Potassium      3.5 - 5.3 mmol/L 3.3 (L)  3.7    Chloride      96 - 108 mmol/L 108  112 (H)    Carbon Dioxide      21 - 32 mmol/L 12 (L)  19 (L)    ANION GAP      mmol/L 18  7    BUN      5 - 25 mg/dL 20  11    Creatinine      0.60 - 1.30 mg/dL 0.62  0.40 (L)    GLUCOSE      65 - 140 mg/dL 222 (H)  146 (H)    Calcium      8.4 - 10.2 mg/dL 9.6  8.9    AST      13 - 39 U/L 21     ALT      7 - 52 U/L 23     ALK PHOS      34 - 104 U/L 99     Total Protein      6.4 - 8.4 g/dL 7.8     Albumin      3.5 - 5.0 g/dL 4.5     Total Bilirubin      0.20 - 1.00 mg/dL 0.56     GFR, Calculated      ml/min/1.73sq m 111  128         General exam   Temperature 97.9 °F (36.6 °C), temperature source Temporal, resp. rate 14, height 5' (1.524 m), weight 43.4 kg (95 lb 9.6 oz).  Appearance:  facial dysmorphia of intellectual disability, wide awake, relatively calm during the visit; she is playing with her toy  Cardiovascular: regular rate and rhythm  Pulmonary: not assessed due to positioning    Abdomen: nondistended, bowel sounds are present  Extremities: there is no  peripheral edema    HEENT:  moist mucus membranes, poor dentition, dysconjugate gaze    Fundoscopy: not assessed    Mental status  Orientation:  awake does not seem to track noise, but will hold onto her toy  Due to her intellectual disability, she is nonverbal so Fund of Knowledge, Attention and Concentration, and Memory cannot be tested  Language:  moans and make nonsensical vocalizations    Cranial Nerves  CN 1: not tested  CN 2:  pupils are symmetric, round, and equally reactive to light    CN 3, 4, 6:  no nystagmus is present  CN 5:not assessed  CN 7:muscles of facial expression are symmetric  CN 8: not assessed  CN 9,10: not assessed  CN 11: not assessed  CN 12:not assessed    Motor:  Bulk, Tone: normal bulk, normal tone  Pronation: not assessed  Strength: Unable to assess limb strength by confrontational testing.  She holds her legs flexed at the knees and on her feet on the stretcher with symmetric strength to resist passive extension of the legs  She has good  on her hands, while holding her toys and resists me taking away her toy  Abnormal movements: None    Sensory:  Lighttouch: not assessed    Coordination:  Unable to test    Reflexes:   Reflexes were not assessed    Past Medical/Surgical History:  Patient Active Problem List   Diagnosis    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HCC)    Underweight    Osteoporosis    Onychomycosis    Hyperkeratosis    Cerebral anoxic injury (HCC)    Intractable epilepsy with status epilepticus (HCC)    Somnolence    Low blood pressure    Incontinence    Skin breakdown    MRSA colonization    Right atrial enlargement    Hypophosphatemia    Sedated due to multiple medications    Breast mass    S/P placement of VNS (vagus nerve stimulation) device    Moderate protein-calorie malnutrition (HCC)    Dislodged gastrostomy tube    Fatty infiltration of liver    Intellectual disability with epilepsy (HCC)    Labyrinthine disorder    Labial cyst    PEG tube malfunction (HCC)    Fracture of  tooth     Past Surgical History:   Procedure Laterality Date    ABDOMINAL SURGERY      CARDIAC PACEMAKER PLACEMENT      vns implant l chest    IR GASTROJEJUNOSTOMY (GJ) TUBE PLACEMENT  12/8/2023    IR GASTROSTOMY TUBE PLACEMENT  11/21/2019    IR THORACENTESIS  12/17/2018    JEJUNOSTOMY FEEDING TUBE      history of - most recently, PEG tube    PEG TUBE PLACEMENT      KS INSJ/RPLCMT CRANIAL NEUROSTIM PULSE GENERATOR Left 12/18/2019    Procedure: REPLACEMENT IMPLANTABLE PULSE GENERATOR FOR VAGAL NERVE STIMULATOR, LEFT CHEST;  Surgeon: Patrick Canales MD;  Location: [facility];  Service: Neurosurgery     Past Psychiatric History:  History of behavioral agitation but she is no longer on a one to one because she is generally too sedated.    Medications:    Current Outpatient Medications:     acetaminophen (TYLENOL) 325 mg tablet, Take 650 mg by mouth every 4 (four) hours as needed for mild pain or fever, Disp: , Rfl:     brivaracetam (BRIVIACT) 50 MG TABS tablet, Give 1 tablet via PEG tube q12 hours, Disp: 60 tablet, Rfl: 5    cloBAZam (ONFI) 10 MG tablet, Give 10mg via PEG-tube once daily at bedtime, Disp: 30 tablet, Rfl: 5    diazepam (VALIUM) 5 MG/ML solution, If the patient has a seizure lasting more than 3 minutes then give 1mL by PEG tube, may repeat 1mL if she continues to have seizure for more than 10 minutes., Disp: 30 mL, Rfl: 1    lacosamide (VIMPAT) 200 mg tablet, Give 1 tablet via PEG tube q12 hours, Disp: 60 tablet, Rfl: 5    Probiotic Product (ALEXANDER-BID PROBIOTIC PO), 1 tablet by Per G Tube route daily  , Disp: , Rfl:     rufinamide (BANZEL) 400 mg tablet, 4 tablets (1,600 mg total) by Per G Tube route every 12 (twelve) hours, Disp: 240 tablet, Rfl: 5    topiramate (TOPAMAX) 50 MG tablet, 5 tablets (250 mg total) by Per G Tube route every 12 (twelve) hours, Disp: 300 tablet, Rfl: 5    VITAMIN D PO, Take 1,000 mg by mouth in the morning Given in her G-tube, Disp: , Rfl:     Briviact 10 MG/ML SOLN oral solution,  , Disp: , Rfl:     cannabidiol (Epidiolex) 100 mg/ml SOLN, 5 mL (500 mg total) by Per G Tube route 2 (two) times a day, Disp: 300 mL, Rfl: 5    Allergies:  Allergies   Allergen Reactions    Felbamate      Family history:  History reviewed. No pertinent family history.  There is no family history of seizure, epilepsy or developmental delay.      Social History  Living situation:  Resident of [facility] - new owners now called the [facility]   reports that she has never smoked. She has never used smokeless tobacco. She reports that she does not currently use alcohol. She reports that she does not currently use drugs.     Neurology/Epilepsy Procedure Note  VNS Interrogation and reprogramming    Model:   S/N: [id number]  Date of implant: 12/18/2019    Current settings:  Output Current 2.0 mAmp   Signal Frequency 20 Hz   Pulse Width 250 microsec   Signal On Time 30 sec   Signal Off Time 1.1 min     AutoStimulation settings:  AutoStim Output 2.0 mAmp   Pulse Width 250 msec   AutoStim On Time 30 sec     Magnet settings:  Mag Output 2.25 mAmp   Pulse Width 500 microsec   Mag On Time 60 sec     Tachycardia Detection:  Enabled  Heartbeat verification: not done  Heartbeat Sensitivity:  4  Threshold:  40 to 20%    Diagnostics:  Communication OK   Output current 2.0mAmp  Lead Impedance OK  Impedance value 1975 Ohm  IFI OK  Battery indicator 25-50%    Lifetime Magnet activation 20 (last used 12/16, 10/31, 10/10. 9./16, 9/8)  % stimulation 35%    Reprogrammed settings:  Output Current 2.0 mAmp   Signal Frequency 20 Hz   Pulse Width 500 microsec   Signal On Time 30 sec   Signal Off Time 1.1 min     AutoStimulation settings:  AutoStim Output 2.0 mAmp   Pulse Width 500 msec   AutoStim On Time 30 sec     Magnet settings:  Mag Output 2.25 mAmp   Pulse Width 500 microsec   Mag On Time 60 sec        Review of Systems  A review of at least 12 organ/systems was obtained by the medical assistant and reviewed by me, including  additional positives/negatives:   Neurological:  Positive for seizures (3 since her last ov 12/3.4and 25th).   Decision making was of high-complexity due to the patient's high risk condition (seizures), psychiatric and neuropsychological comorbidities, behavioral problems, memory and cognitive problems and medication side effects.      The total amount of time spent with the patient along with pre-chart and post-chart preparation was 47 minutes on the calendar day of the date of service.  This included history taking, physical exam, review of ancillary testing, counseling provided to the patient regarding diagnosis, medications, treatment, and risk management, and other communication to the patient's providers and/or family.  Start time: 2:48PM  End time: 3:35PM    The [place] was queried.  No red flags were identified.  The patient is low risk for abuse of the prescribed clobazam medication.  Safe to proceed with prescription.

## 2024-01-25 NOTE — TELEPHONE ENCOUNTER
Patient's aide stated that facility will call to schedule patient's next appointment (follow-up in 3 months with advanced practitioner).

## 2024-01-25 NOTE — PROGRESS NOTES
Review of Systems   Constitutional:  Negative for appetite change, fatigue and fever.   HENT: Negative.  Negative for hearing loss, tinnitus, trouble swallowing and voice change.    Eyes: Negative.  Negative for photophobia, pain and visual disturbance.   Respiratory: Negative.  Negative for shortness of breath.    Cardiovascular: Negative.  Negative for palpitations.   Gastrointestinal: Negative.  Negative for nausea and vomiting.   Endocrine: Negative.  Negative for cold intolerance.   Genitourinary: Negative.  Negative for dysuria, frequency and urgency.   Musculoskeletal:  Negative for back pain, gait problem, myalgias, neck pain and neck stiffness.   Skin: Negative.  Negative for rash.   Allergic/Immunologic: Negative.    Neurological:  Positive for seizures (3 since her last ov 12/3.4and 25th). Negative for dizziness, tremors, syncope, facial asymmetry, speech difficulty, weakness, light-headedness, numbness and headaches.   Hematological: Negative.  Does not bruise/bleed easily.   Psychiatric/Behavioral: Negative.  Negative for confusion, hallucinations and sleep disturbance.    All other systems reviewed and are negative.

## 2024-02-08 DIAGNOSIS — G40.814 INTRACTABLE LENNOX-GASTAUT SYNDROME WITHOUT STATUS EPILEPTICUS (HCC): ICD-10-CM

## 2024-02-08 NOTE — TELEPHONE ENCOUNTER
Signed Epidiolex script for it to go to [facility] on [address].

## 2024-02-08 NOTE — TELEPHONE ENCOUNTER
VM 2/7 at 10:19 am:    Hello. Hi, this is Rox calling from [facility]. I am calling in regards to Maira Bull, date of birth, 1980. I called here at [facility] to reorder the Epidiolex, but they do not have the prescription there at that pharmacy. Would you be able to fax the script to them and I guess just give me a call when you get the message. CB#[phone].  ________________________________________________    Called [facility] to inquire which [facility] they are requesting the medication be sent to. Was advised that it should be [facility] [address]. Pended the med and routed to provider to review. Last OV was 1/25/24.

## 2024-03-22 ENCOUNTER — TELEPHONE (OUTPATIENT)
Dept: NEUROLOGY | Facility: CLINIC | Age: 44
End: 2024-03-22

## 2024-03-22 DIAGNOSIS — G40.814 INTRACTABLE LENNOX-GASTAUT SYNDROME WITHOUT STATUS EPILEPTICUS (HCC): ICD-10-CM

## 2024-03-22 RX ORDER — CLOBAZAM 10 MG/1
TABLET ORAL
Qty: 45 TABLET | Refills: 5 | Status: SHIPPED | OUTPATIENT
Start: 2024-03-22

## 2024-03-22 NOTE — TELEPHONE ENCOUNTER
VM 3/20 at 9:54 am:    Good morning, my name is Madeline. I'm calling from the [facility] in [place]. I am calling about a mutual patient. Maira Bull. I'm calling because her cannabidiol solution is unavailable from our pharmacy until Friday. I wanted to verify that it was okay for us to hold until we receive it. If you could, please give me a call back, [phone]. Thank you.  _______________________________________________________________________________    Called the [facility] and spoke with Lorna. Lorna confirmed that the cannabidiol solution arrived today. Lorna actually saw Maira when she was having the seizures. Lorna said that Maira has not moved to a different location. These seizures were unusual for her. She was awake when she had these seizures. Her body became very tense, her face reddened, and she was calling out during the seizures, which is new. No generalized convulsive seizures. The seizure would last 20-30 seconds, and then stop for a minute or so and then she would have another one. She had about 5 or 6 of them in a row. Pt has not started any new medications for infection. All her meds have been the same. Routed to provider to make him aware.

## 2024-03-22 NOTE — TELEPHONE ENCOUNTER
Alba  3/19 12:15 PM     Ann Marie calling from the [facility] regarding naila oakes, date of birth, [birth year]. I just wanted dr. España to be aware that she had multiple seizures  on the 16th, lasting 20 to 30 seconds, and the valium was effective. If you could please give us a call back at [phone]. Again, the phone number is [phone].

## 2024-03-22 NOTE — TELEPHONE ENCOUNTER
Please have the nurses get blood work completed to check her medication levels.  Blood work was ordered from her last visit in January 2024 - I need to know how high is her clobazam.   But until I can get the result, please instruct to increase Clobazam dose to 5mg in the morning and 10mg at bedtime.    New prescription was sent to [facility] pharmacy.

## 2024-03-22 NOTE — TELEPHONE ENCOUNTER
Quynh España MD   to Neurology [place] Clinical Team 1       3/22/24 10:45 AM  Please get more information from the nursing team about her seizures.  Did she move to a new location?    Marisela has intractable epilepsy, she essentially has constant tonic seizures when she is asleep.  No medications have worked for her to suppress these seizures.  Has she been having seizures when she is fully awake?  Any generalized convulsive seizures (excluding 10-20 seconds of body tension with eyes rolling back)?    Was she put on any new medications for treating infections?    Dr. España

## 2024-03-25 NOTE — TELEPHONE ENCOUNTER
Called re: Dr. España's recommendations below and spoke with caregiver, who verbalized an understanding. She asked that the labs be faxed to [phone] (Done).

## 2024-04-11 ENCOUNTER — TELEPHONE (OUTPATIENT)
Dept: NEUROLOGY | Facility: CLINIC | Age: 44
End: 2024-04-11

## 2024-04-11 NOTE — TELEPHONE ENCOUNTER
Called as requested. Spoke with Amy from [facility]. Accepted appt on 4/18 at 1015 am due to setting up transportation for the patient. Location address was provided as well.

## 2024-04-11 NOTE — TELEPHONE ENCOUNTER
Patient is overdue for follow up. Needs in office visit for VNS check as this is likely near end of life. Can be with  or AP    Clerical - can you please assist with follow up?

## 2024-04-18 ENCOUNTER — OFFICE VISIT (OUTPATIENT)
Dept: NEUROLOGY | Facility: CLINIC | Age: 44
End: 2024-04-18
Payer: MEDICARE

## 2024-04-18 ENCOUNTER — TELEPHONE (OUTPATIENT)
Dept: NEUROLOGY | Facility: CLINIC | Age: 44
End: 2024-04-18

## 2024-04-18 VITALS
DIASTOLIC BLOOD PRESSURE: 60 MMHG | HEIGHT: 60 IN | WEIGHT: 95 LBS | TEMPERATURE: 96.5 F | BODY MASS INDEX: 18.65 KG/M2 | HEART RATE: 71 BPM | OXYGEN SATURATION: 96 % | SYSTOLIC BLOOD PRESSURE: 90 MMHG

## 2024-04-18 DIAGNOSIS — G40.813 INTRACTABLE LENNOX-GASTAUT SYNDROME WITH STATUS EPILEPTICUS (HCC): Primary | ICD-10-CM

## 2024-04-18 DIAGNOSIS — Z96.89 S/P PLACEMENT OF VNS (VAGUS NERVE STIMULATION) DEVICE: ICD-10-CM

## 2024-04-18 PROCEDURE — 99214 OFFICE O/P EST MOD 30 MIN: CPT | Performed by: PHYSICIAN ASSISTANT

## 2024-04-18 RX ORDER — BISACODYL 10 MG
10 SUPPOSITORY, RECTAL RECTAL DAILY
COMMUNITY

## 2024-04-18 NOTE — TELEPHONE ENCOUNTER
Yes, battery is still 25-50% and I am seeing her back in 3 months to check it again.  I will see how seizure frequency is at next visit and if they feel there has been an increase, can start Xcopri.

## 2024-04-18 NOTE — PATIENT INSTRUCTIONS
Plan:   Medications are given by PEG tube  1 - Continue Epidiolex 100mg/mL 5.0 mL (500mg) every 12 hours  2 - Continue Briviact 50mg tab one tabe every 12 hours  3 - Continue with Topiramate 50mg tabs give 5 tabs (250mg) every 12 hours  4 - continue Banzel 400mg give 4 tabs every 12 hours  5 - Continue Clobazam 10mg tab give 1/2 tab (5mg) in the AM, and one tab (10mg) at bedtime  6 - Continue Lacosamide 200mg tab one tab every 12 hours.  7 - give diazepam 5mg/mL 1 mL as needed for seizure last more than 3 minutes and repeat second dose for seizure lasting more than 10 minutes  8 - Call the office if there is increase in seizure frequency  9 - please check topiramate, lacosamide, clobazam, and rufinamide levels.  There were labs sent today from 4/17/24, however they did NOT include her anti-epileptic drug levels that we ordered   10 - follow-up in 3 months     Once VNS battery life is <25%, will need to refer to neurosurgery for battery change

## 2024-04-18 NOTE — TELEPHONE ENCOUNTER
When I saw her this morning, the staff member she was with said she may have had a little decrease in seizures when you increased her clobazam a month ago (she was on 10mg PM, and you added 5mg AM).    I left things the same since they felt she had some improvement with increasing clobazam.    Do you want me to call and have them start the Xcopri, or want for her 3 month follow up to see how things are going as far as seizure frequency then?

## 2024-04-18 NOTE — TELEPHONE ENCOUNTER
Dr. España is out of the office this week and I am seeing the patient today (she is being roomed as I am writing this).  Will address today.  Patient has nearly constant seizures and this is her usual presentation.

## 2024-04-18 NOTE — TELEPHONE ENCOUNTER
Called [facility] at [phone] ext [phone] (nursing unit, West), no answer. Tried 2 (nursing unit East), no answer. Tried ext 0, no answer. Then call got disconnected    Called [facility] again, ext 8 (Director of nursing), spoke to Debra     Seizure description: 4/15/24 around 3-11 shift. The documentation said consistent seizures. Multiple seizure. States that there is break in between. 30 min all together. No description was documented but it is her usual type of seizure. Pt was given Valium x1 and used the VNS.     Witnessed? Yes. Pt was sleeping in bed. It was not documented whether pt woke up and had a seizure.     Multiple Seizures? Yes     Brought to the ER? No     Usual type of seizure? Yes    Sleep deprivation? No    Missed Medications? No     Recently/Currently ill (URI, UTI, infections etc.) No    Any new medicatons (including OTC) No    ETOH or Drug use? No    New Stressors? No  Current Medications confirmed as:  Epidiolex 5 ml bid   Clobazam 10 mg at bedtime  Banzel 400 mg 4 tabs bid  Briviact 50 mg bid   Diazepam 1 ml prn   Topamax 50 mg 5 tab bid  Lacosamide 200 mg bid

## 2024-04-18 NOTE — TELEPHONE ENCOUNTER
You can start her on Cenobamate at this point, she has exhausted all of the typical antiseizure medications.  There is another one to consider Fintepla, but she will need to be enrolled in a REMS program.    Side effects of cenobamate (Xcopri).  Side effects of cenobamate include hypersensitivity to cenobamate and inactive ingredients, drug reaction with eosinophila and systemic symptoms (DRESS), rash, fever, hepatic failure, renal failure, facial swelling, angioedema, QT shortening (patients with familial short QT syndrome should not take this medication), suicidal behavior and ideation, somnolence, dizziness, gait difficulty, cognitive impairment, visual impairment.  Cenobamate is an inducer of CY and CYP2B6 (elevate carbamazepine, lamotrigine, oral contraceptives) and inhibitor of SXR3M19 (phenytoin, phenobarbital, clobazam).    Titration instructions  Week 1 and 2 - take 12.5mg tab one tab daily  Week 3 and 4 - take 25mg tab one tab daily  Week 5 and 6 - take 50mg tab one tab daily  Week 7 and 8 - take 100mg tab one tab daily  Maintain at 100mg one tab daily.

## 2024-04-18 NOTE — PROGRESS NOTES
Patient ID: Maira Bull is a 43 y.o. female.    Assessment:  Ms. Maira Bull is a 43 y.o. woman with Intractable LGS, frequent tonic seizures (especially during sleep), and intermittent witnessed tonic seizures. 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 She likely have more frequent tonic seizures during sleep, based on prior continuous EEG monitoring studies. Her EEG studies show both generalized onset and potential risk for focal onset seizures. She has variable degree of wakefulness and non-standard sleep cycle.     At timing of last visit, Epidiolex was increased.  More recently, she has had some clusters of seizures and clobazam was increased (she has done well on this in the past).  Staff feels maybe a slight reduction in frequency since clobazam was recently increased.  We can continue to monitor on current doses. There is low probability that further medications will improve current seizure control, however if at next visit her seizure frequency has increased, we could consider adding Xcopri (cenobamate).     She will likely need a new VNS generator placed due to higher output current which can deplete the battery sooner.  Battery checked today still at 25-50%.  Will continue to monitor battery life and refer to neurosurgery once battery is <25%.    Plan:   Medications are given by PEG tube  1 - Continue Epidiolex 100mg/mL 5.0 mL (500mg) every 12 hours  2 - Continue Briviact 50mg tab one tabe every 12 hours  3 - Continue with Topiramate 50mg tabs give 5 tabs (250mg) every 12 hours  4 - Continue Banzel 400mg give 4 tabs every 12 hours  5 - Continue Clobazam 10mg tab give 1/2 tab (5mg) in the AM, and one tab (10mg) at bedtime  6 - Continue Lacosamide 200mg tab one tab every 12 hours.  7 - give diazepam 5mg/mL 1 mL as needed for seizure last more than 3 minutes and repeat second dose for seizure lasting more than 10 minutes  8 - Call the office if there is increase  in seizure frequency.  Could consider adding Xcopri.  9 - please check topiramate, lacosamide, clobazam, and rufinamide levels.  There were lab results sent today from 4/17/24, however they did NOT include her anti-epileptic drug levels that we ordered   10 - follow-up in 3 months with [facility], 6 months with Dr. España     Once VNS battery life is <25%, will need to refer to neurosurgery for battery change        Diagnoses and all orders for this visit:    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)    S/P placement of VNS (vagus nerve stimulation) device    Other orders  -     bisacodyl (DULCOLAX) 10 mg suppository; Insert 10 mg into the rectum daily           Subjective:    HPI    Maira Bull is a 43 y.o.female here for follow-up evaluation of intractable epilepsy in the setting of LGS.      Interval History 4/18/2024  She was last seen by Dr. España on 1/25/24.  Epidiolex was increased and facility was asked to get her blood work done to check AED levels.    There was a call to our office on 3/22/24 from her facility reporting patient had multiple seizures on 3/16.  There were no changes to medications, new infections etc.  The seizures were the same, except staff member reported she would “call out” during these seizures, which she says was new.  Dr. España advised getting blood work done to check AED levels (these were ordered in January) and also increasing clobazam to 5mg AM and 10mg PM.    There was a call to the office yesterday, again reporting patient had a cluster of seizures on 4/15/24, that were on and off lasting 30 min total, given diazepam and magnet was swiped.  Reported as her usual type of seizure.  Patient was sleeping when this happened.      She is here today with YUSEF Giordano, who has worked with Maira for several years.  Giuliana reports Maira has been “at her baseline”.  She has not seen excessive sedation more than normal since clobazam was increased.  She feels seizures are  “the same, but maybe a little less”.  Labs were sent with the patient, collected 4/17/24.  Unfortunately, AED levels were not done.  These labs were likely ordered by another provider (included CMP, CBC, A1C).  CBC and CMP normal.       AED/side effects/compliance:  Banzel 400mg give 4 tabs twice a day   Topiramate 250mg tab twice a day   Epidiolex 500mg twice a day  Briviact 10mg/mL 50mg twice a day  Lacosamide 200-200  Onfi 5mg AM, 10mg at bedtime     Event/Seizure semiology:  Seizure semiology:  She was described to have drop attacks or spells with grunting sounds and falling to the floor.   Her nurse commented on episodes of spasms or myoclonic jerking of the right arm.   Generalized convulsions  Tonic seizures of eyes rolling back (mostly during sleep)     Prior Epilepsy History:  Collective epilepsy history 11/6/2014 was previously evaluated by Dr. Harper including a hospitalization in February 2013 for status epilepticus, in the setting of missed doses of AEDs due to refusal to eat. She subsequently required anesthetic induced coma to stop her seizures and PEG tube was placed. She was seen almost every month for management of her seizures up until November 2013. She has medically refractory seizures and had a VNS placed possibly in the early 2000s and a generator changed in 2011. Unknown AEDs that were tried but felbamate is listed as an allergy. In 2011, her AEDs were clonazepam, levetiracetam, Depakote and Lamictal. Dr. Harper had started Banzel in 2011. In 2012, Onfi was added with the reduction of clonazepam. There were difficulty with documenting seizure frequency; but seizures were no longer daily occurrences but there were clusters of seizures. There would be good periods and bad periods of seizure frequency. Dr. Harper had been increasing the duty cycle of the VNS over the course of 2012 to 2013. Sometime between August 2013 and October 2013, topiramate was introduced. She was in status epilepticus in 2013,  she was on Keppra, Banzel, Onfi, and Lamictal. She had an EEG at some point that showed multifocal spike-wave and background attenuation. She has intermittent agitation and day time somnolence. When she was hospitalized for status epilepticus, she was started on methylphenidate to help wake her up.      Intake history November 2014:  VPA level 127. Her Valproic Acid dose was previously 250mg q6hrs based on Dr. Harper's notes. Since July 2014, she has been receiving VPA 500mg q6hrs. She has not been hospitalized since September 2013. She was previously ambulatory with therapy. She spends most of her time sleeping for the past couple of months. She has also been receiving lactulose for elevated ammonia levels.   (older drugs VPA, LVT, LMG, newer drugs added on since 2011 RFN, Onfi, TPM)      Summary of 2015:  We decreased VPA from TDD 2000mg to 1000mg with increased agitation/combativeness, alternating days of exhaustion (no behavioral specialist has been involved). are randomly reported by multiple staff members. Seizures are typically reported by CNA's or her one to one on the 3PM to 11PM shift. Seizures are described a brief events of eyes rolling back and arms going up in the air, followed by hair pulling or slapping herself. These seizures were reported as either sporadic or every other day episodes. There have been no documented grand mal seizures or drop attacks. She refuses to wear safety helmet, rips at her hair, and attempts to flip herself out of her chair and bed. Maira can walk briefly but walks on her toes, she frequently will allow herself to fall down when she does not want to walk.  It does not appear that methylphenidate has been useful in maintaining her level of wakefulness during the day.    VNS generator change on 11/3/2015. The Model 103 (serial #[id number]) was replaced with [facility] Model 105, serial #[id number]. Weaned off of levetiracetam due to multiple medications and agitation; by the end of 2016,  she was down to -500.       Summary of 2016:  Started with RFN [id number], -250, CLB 0.5-0.5-25,  QID, -200 attempted to wean off of LEV and reduced the dose of VPA given that there were no reports of seizures and she was sedated, along with elevated ammonia levels. It was unclear as to how effective LEV was for her seizure control. When she missed a dose of TPM in September 2016, she had multiple seizures. We attempted to compensate for increase in seizures by increasing Onfi to 20-20; however, it seems that it made her more sedated.      Summary of 2017  RFN [id number], -250, Onfi 20-20, -200,  q8hrs. She has been more somnolent. She continues to have tonic seizures when she is asleep, eyes rolling body, arms will tense up with some twitching, last a few seconds, but will recur. There are no seizures during the daytime. We attempted to wean off of VPA due to ammonia / somnolence; but there was an increase in tonic seizures (tonic stiffness, eyes rolling upwards, and subtle arm (right?) jerking).      Hospital admission 10/10-10/26/2017, due to seizures in setting of infection, was put on continuous video EEG monitoring to determine her seizure type, seizure frequency, and rapid medication adjustments. Rapid med changes: d/c'ed lamotrigine, restarted levetiracetam, attempted discontinuation of valproic acid (more seizures, so restarted), attempted reduction in Onfi, and starting Fycompa. The patient's seizures were mostly based during sleep, tonic seizures.      Summary of 2018  Started with RFN [id number], PER 8, LEV 7970-9112, CLB 5-15, -250,  TID. Due to excessive somnolence Onfi was weaned off. She was on continuous EEG monitoring when she was in hospital for PNA that showed very frequent tonic seizures during sleep. VPA is causing hyperammoniemia. She has had more admissions for somnolence / lethargy, VPA was reduced for transaminitis. She was tested  for inborn error of metabolism with plasma amino acid, urine organic acid, and acylcarnitine levels. There were nonspecific elevations in some amino acid or organic acid but no pattern consistent with a specific inborn error of metabolism. Elevated carnitine level was consistent with supplementation. Higher doses of phenobarbital may also contribute to sedation, phenobarbital was reduced to 20mg but on follow-up she was on 20mL of oral solution (80mg / day).     There is variable reporting in the frequency of tonic seizures (these are the eyes are rolling back and shaking, then stop, then happen again in 10-15 minutes). Her level of alertness is also variable with erratic sleep cycle.     Summary 2019  We started the year with  TID, RFN [id number], -200, LEV 6408-2114, PER 10, PB 40 qHS. In December 2018, Hospital admission for septic shock and aspiration PNA. She had an EEG that captured recurrent tonic seizures during sleep but this is consistently found in all of her other EEG studies. Another hospital admission January 2019 for recurrent convulsive seizures.  Due to sedation Phenobarbital was weaned off and Fycompa was increased to 10mg at bedtime. TPM increased to 250-250. Tried to wean off of VPA due to ammonia levels. We started Epidiolex in the Spring 2019, which seems to have improved seizure control.     January 2019 - Her CNA reports that Maira is no longer Maira, she is essentially bed ridden. She does not eat and does not walk. Maira usually participate in activities, walking, and eating food (if she was at a [facility] she would be able to eat a hamburger without difficulty). I was able to speak to her father Oneil and he reported that he too saw a significant decline in mental status. There was a phone call from [facility] reporting an increase in seizure activity (brief episodes tonic-myoclonic jerking); but subsequent reports did not notice an increase in activity.       Weaned off of phenobarbital, suspecting that it was causing elevated transaminases. There is variable periods of wakefulness and alertness; seizures are reported sporadically, sometimes she seems to have clusters of seizures (during an office visit there were about 8 tonic seizures lasting 15-20 seconds). We weaned her off of valproic acid and increased Epidiolex.   June 2019, hospitalized for status epilepticus (multiple clinical seizures, every 5-6 minutes, associated with apnea). Continuous EEG monitoring showed very frequent 15-30 seconds tonic seizures , whether this is different from her baseline is difficult to determine. Since she was on continuous EEG monitoring a number of medication trials were given. At one point it seems that lorazepam and more levetiracetam had improved seizures. Her tube feed was adjust to more protein and lower carbohydrates.  2019 - multiple phone calls regarding recurrent seizures (generalized shaking, tonic body rigidity and clonic activity)     Summary 2020  RUF [id number], -250, LEV 1000 q8hr, -400, JESSICA 50-50, CLB 5 qHS. Replacement of her VNS in December 2019, went from M105 to  model.  Seizure reporting had been sporadic. Despite medication adjustments, there has been no improvement in degree of wakefulness, nonverbal, calls out at times. She does not do much.  Transitioned LEV to Brivaracetam. Increased clobazam to 10mg at bedtime on 5/28/2020. Unclear if there has been improvement in seizure frequency; but there were no admissions to hospital for seizures.     Summary 2021  RUF [id number], -250, -400, JESSICA 75-75, CLB 5-10. She was admitted to Hospital on 2/2/2021 for multiple seizures in one day. Onfi was increased with an extra 5mg tab in AM. With the increase in Onfi, there has been no report of excessive sedation. She continues to have stretches of non-24 hour sleep cycle. She has periods when she appears to be active, smiling and laughter  and periods when she has no interactions with those around her.  Her seizures are very difficult to notice, unless someone is with her. We had tried to reduce her Brivaracetam dose as there was no clear improvement at higher dose and she has been losing weight. We started Vimpat at the end of the year.     Summary 2022  RUF [id number], -250, -400, BRV 50-50, CLB 5-10.   Often seems to be sleepy. She is sometimes on a play mat, she mostly lays there, sometimes she plays with her toys. She may scream or yell throughout the day. One time she was able to say her name and clap her hands and laugh at something. She has moments of increased alertness and awareness.   She continues to have occasional witnessed seizures (body and head rigidity, eyes rolling up, sometimes mild twitching of the arms). The last time a rescue medication was needed was back in September 2021.   We tried to reduce her dose of clobazam to reduce sedation and replace it with lacosamide.     Summary 2023  RUF [id number], -250, -400, BRV 50-50, CLB 10, -150. She continues to have episodic seizures. Seizures involved whole body shaking, eyes fixed, drooling. Her clobazam was increased when there was a cluster of seizures. She has variable degree of wakefulness, sometimes she is wild and screaming, sometimes she is lethargic. Most of her seizures are the tonic seizure type. No report of tonic-clonic seizures.     Interval History 1/25/2024  She was last seen by Li Vance on 8/16/2023 - there were 2 documented seizrues, body tenses up, face read, drooling. Seizures involve tense body, arms flexed, face red, and labored breathing.   No changes were made.     Since her last visit, she had three seizures in December (12/3 and 12/25).  She was admitted to hospital on 12/7 due to dislodged G-tube, she was not able to get her antiseizure medications while she did not have a G-tube, so she had a cluster of 6-7 generalized  tonic clonic seizures (upper body stiffening and shaking and unresponsiveness).  She had an EEG study that showed multiple tonic seizures.   [place] -   She had seizures on  needed diazepam to stop seizure.  She had seizure-like activiey on 10/29 2 episodes within 3 minute (lasted 10 seconds, tense and tight, with repetitive shaking of arms and face turning red).     Special Features  Status epilepticus: yes  Self Injury Seizures: No  Precipitating Factors: menstrual cycle??     Epilepsy Risk Factors:  Abnormal pregnancy: unknown  Abnormal birth/: unknown  Abnormal Development: unknown developmental history  Febrile seizures, simple: unknown  Febrile seizures, complex: unknown  CNS infection: No  Mental retardation: Yes  Cerebral palsy: Yes  Head injury (moderate/severe): possibly anoxic brain injury  CNS neoplasm: No  CNS malformation: No  Neurosurgical procedure: No  Stroke: No  Alcohol abuse: No  Drug abuse: No  Family history Sz/epilepsy: unknown     Prior AEDs:  Rufinamide  Clobazam (excessive sedation)  Clonazepam  Levetiracetam (unclear if beneficial)  Lamotrigine (unclear if beneficial)  Topiramate (missed doses caused breakthrough convulsive type of seizures)  Valproate (elevated ammonia levels)  Fycompa (excess sedation)  Felbamate (listed as a drug allergy)  Phenobarbital (contributing to sedation)  Epidiolex (seems to help the most)  Brivaracetam  Lacosamide     Prior workup:  x   Imaging:  CT head 2013, 2018  No acute intracranial pathology     3/20/2019  MRI brain w/wo contrast  Normal MR brain study  Symmetric hippocampal formations     EEGs:  Continuous video EEG monitoring 10/13 - 10/15/2017  Frequent generalized spike/polyspike-slow wave complexes during sleep  At times there are independent right more than left midtemporal spikes and bitemporal spikes     Innumerable generalized tonic seizures during sleep, generalized 1 Hz period discharges, that would become continuous for 30  seconds or generalized rhythmic alpha-beta discharges, associated with patient becoming rigid, tonic posturing with arms elevated and tense with subtle jerking of the upper body, eyes opening with upward deviation.  Ictal patterns:  1 - Seconds of diffuse electrodecrement then paroxysmal fast activity followed by 2Hz spike wave discharges (clinically accompanied by posturing of the arms, then clonic jerking)     Continuous video EEG monitoring 10/18 - 10/25/2017  Diffuse background slowing with bursts of arrhythmic generalized spike wave discharges, with shifting lateralization, and independent left and right temporal spikes  Mild eyelid myoclonia associated with brief bursts of 2-2.5 Hz generalized discharges  Tonic seizures with eelctrodecrement  There were innumerable tonic seizures; however by 10/25/2017 the frequency of these seizures did decrease in frequency.     Continuous video EEG monitoring 12/1-12/5/2017  There are innumerable tonic seizures that are generally less than one minute in duration (30-40 seconds), these consists of subtle eye opening and tonic stiffening of arms, if longer then whole body stiffening with clonic jerking movements. These almost always occur exclusively out of sleep. These consist of 2-2.5 Hz generalized spike-slow wave complexes with generalized attenuation of activity (desynchronization) or paroxysmal fast activity. Per 24 hours count of seizures could be .     12/19/2018 routine study  Frequent recurrent tonic seizures associated with paroxysmal generalized fast activity then generalized rhythmic discharges associated with eyes upward deviation, and myoclonic/clonic jerking of the upper body, excess beta activity.     6/28-7/3/2019 continuous video EEG monitoring  Frequent clusters of tonic seizures (body rigidity, head flexions, eyes go up with dysconjugate gaze, and clonic activity of the arms for a few seconds), these are associated with GPFA and rhythmic spike-slow  waves.  There are also bilateral temporal focal epileptiform discharges    The following portions of the patient's history were reviewed and updated as appropriate: current medications, past family history, past medical history, past social history, past surgical history, and problem list.         Objective:    Blood pressure 90/60, pulse 71, temperature (!) 96.5 °F (35.8 °C), temperature source Temporal, height 5' (1.524 m), weight 43.1 kg (95 lb), SpO2 96%.    Physical Exam  Constitutional:       Comments: Facial dysmorphia of intellectual disability.  Lying on stretcher, initially sleeping, but easily aroused when I started talking directly to her and examining her.  Playing with her toy   Eyes:      Extraocular Movements: EOM normal.      Pupils: Pupils are equal, round, and reactive to light.   Psychiatric:      Comments: Overall calm, quiet          Neurological Exam  Mental Status    Patient is non-verbal, unable to assess language, memory, attention etc .    Cranial Nerves  CN III, IV, VI: Extraocular movements intact bilaterally. Pupils equal round and reactive to light bilaterally.  CN VII: Muscles of facial expression are symmetric.    Motor   No abnormal involuntary movements.  Unable to perform confrontational testing.  On stretcher has legs flexed, but able to resist passive motion of the legs.  She pushes me away with her arms, has a good grasp on her toy.    Sensory  Unable to assess due to intellectual disability .    Reflexes  Not assessed.    Coordination    Patient unable to follow directions for testing .    Gait    Not assesed, on a stretcher.      Neurology/Epilepsy Procedure Note  VNS Interrogation Only     Model:   S/N: [id number]  Date of implant: 12/18/2019     Battery indicator 25-50%    Current settings:  Output Current 2.0 mAmp   Signal Frequency 20 Hz   Pulse Width 500 microsec   Signal On Time 30 sec   Signal Off Time 1.1 min      AutoStimulation settings:  AutoStim Output 2.0 mAmp    Pulse Width 500 msec   AutoStim On Time 30 sec      Magnet settings:  Mag Output 2.25 mAmp   Pulse Width 500 microsec   Mag On Time 60 sec      Current Outpatient Medications   Medication Sig Dispense Refill    acetaminophen (TYLENOL) 325 mg tablet Take 650 mg by mouth every 4 (four) hours as needed for mild pain or fever      bisacodyl (DULCOLAX) 10 mg suppository Insert 10 mg into the rectum daily      brivaracetam (BRIVIACT) 50 MG TABS tablet Give 1 tablet via PEG tube q12 hours 60 tablet 5    cannabidiol (Epidiolex) 100 mg/ml SOLN 5 mL (500 mg total) by Per G Tube route 2 (two) times a day 300 mL 5    cloBAZam (ONFI) 10 MG tablet Give PEG-tube half a tab in the morning and one tab at bedtime 45 tablet 5    diazepam (VALIUM) 5 MG/ML solution If the patient has a seizure lasting more than 3 minutes then give 1mL by PEG tube, may repeat 1mL if she continues to have seizure for more than 10 minutes. 30 mL 1    lacosamide (VIMPAT) 200 mg tablet Give 1 tablet via PEG tube q12 hours 60 tablet 5    rufinamide (BANZEL) 400 mg tablet 4 tablets (1,600 mg total) by Per G Tube route every 12 (twelve) hours 240 tablet 5    topiramate (TOPAMAX) 50 MG tablet 5 tablets (250 mg total) by Per G Tube route every 12 (twelve) hours 300 tablet 5    Probiotic Product (ALEXANDER-BID PROBIOTIC PO) 1 tablet by Per G Tube route daily        VITAMIN D PO Take 1,000 mg by mouth in the morning Given in her G-tube       No current facility-administered medications for this visit.      Allergies   Allergen Reactions    Felbamate       Past Medical History:   Diagnosis Date    ADHD     Anoxic brain damage (HCC)     Autistic disorder     Breakthrough seizure (HCC) 8/1/2019    Dysphagia     Dysphagia, oropharyngeal phase     Gastrostomy tube dependent (HCC)     14 Fr as of 05/19/2020    Hyperammonemia (HCC)     Hyperkeratosis     Hypotension     Intellectual disability     Lennox-Gastaut syndrome with tonic seizures (HCC)     Lethargy     Liver  enzyme elevation     Onychomycosis     Osteoporosis     Osteoporosis       Past Surgical History:   Procedure Laterality Date    ABDOMINAL SURGERY      CARDIAC PACEMAKER PLACEMENT      vns implant l chest    IR GASTROJEJUNOSTOMY (GJ) TUBE PLACEMENT  12/8/2023    IR GASTROSTOMY TUBE PLACEMENT  11/21/2019    IR THORACENTESIS  12/17/2018    JEJUNOSTOMY FEEDING TUBE      history of - most recently, PEG tube    PEG TUBE PLACEMENT      CO INSJ/RPLCMT CRANIAL NEUROSTIM PULSE GENERATOR Left 12/18/2019    Procedure: REPLACEMENT IMPLANTABLE PULSE GENERATOR FOR VAGAL NERVE STIMULATOR, LEFT CHEST;  Surgeon: Patrick Canales MD;  Location: [facility];  Service: Neurosurgery      History reviewed. No pertinent family history.     Past Psychiatric History:  History of behavioral agitation but she is no longer on a one to one because she is generally too sedated.    Social History  Living situation:  Resident of [facility] - new owners now called the [facility]   reports that she has never smoked. She has never used smokeless tobacco. She reports that she does not currently use alcohol. She reports that she does not currently use drugs.     ROS:    Review of Systems   Constitutional: Negative.    HENT: Negative.     Eyes: Negative.    Respiratory: Negative.     Cardiovascular: Negative.    Gastrointestinal: Negative.    Endocrine: Negative.    Genitourinary: Negative.    Musculoskeletal: Negative.    Skin: Negative.    Allergic/Immunologic: Negative.    Neurological:  Positive for seizures (couple in last few months).   Hematological: Negative.    Psychiatric/Behavioral: Negative.       I personally reviewed and updated the ROS as appropriate

## 2024-04-18 NOTE — TELEPHONE ENCOUNTER
4/16/24, 11:08    This is Amy calling from the [facility]. I'm calling regarding a mutual patient, Marisela Bull, date of birth [birth year]. She does have an appointment with your office on 4/18. However, I just wanted to notify you guys that she did have a seizure last night. And in the notes it's stating roughly 30 minutes. She was given Valium and the magnet was applied to her chest. If you have any questions, feel free to give us a call back at [phone]. Again, the phone number is [phone]. Thank you. Bye.    Will call after [phone]

## 2024-04-18 NOTE — TELEPHONE ENCOUNTER
I was going by the recent telephone call about some breakthrough seizures.  If there have been an increase in seizures, then I would add on Xcopri.  If they report a relatively stable frequency of seizures then no changes to her medications.  Did you check her VNS for the battery?

## 2024-06-12 ENCOUNTER — TELEPHONE (OUTPATIENT)
Dept: NEUROLOGY | Facility: CLINIC | Age: 44
End: 2024-06-12

## 2024-06-12 NOTE — TELEPHONE ENCOUNTER
Spoke with Debra. She states that the Department of Health needs step by step seizure protocol for this patient. So they need a letter on what patient is to do when she has a seizure (example: what medication to give, swipe magnet, etc.)    Would like it emailed back to facility     Email: [email or web address]    Dr. España - is this something you can assist with?

## 2024-06-12 NOTE — TELEPHONE ENCOUNTER
06-, 1:15:19 pm EDT    Received vm from [facility]. She is looking for more detailed orders from the provider regarding this patient's VNS therapy magnets. She is requesting a call back. No other details given.    #: [phone]

## 2024-06-13 DIAGNOSIS — T85.528A DISLODGED GASTROSTOMY TUBE: Primary | ICD-10-CM

## 2024-06-21 ENCOUNTER — HOSPITAL ENCOUNTER (OUTPATIENT)
Dept: INTERVENTIONAL RADIOLOGY/VASCULAR | Facility: HOSPITAL | Age: 44
End: 2024-06-21
Payer: MEDICARE

## 2024-06-21 DIAGNOSIS — T85.528A DISLODGED GASTROSTOMY TUBE: ICD-10-CM

## 2024-06-21 PROCEDURE — 49450 REPLACE G/C TUBE PERC: CPT

## 2024-06-21 PROCEDURE — 49450 REPLACE G/C TUBE PERC: CPT | Performed by: RADIOLOGY

## 2024-06-21 PROCEDURE — C1769 GUIDE WIRE: HCPCS

## 2024-06-21 RX ORDER — LIDOCAINE HYDROCHLORIDE 20 MG/ML
JELLY TOPICAL AS NEEDED
Status: COMPLETED | OUTPATIENT
Start: 2024-06-21 | End: 2024-06-21

## 2024-06-21 RX ADMIN — IOHEXOL 20 ML: 350 INJECTION, SOLUTION INTRAVENOUS at 08:50

## 2024-06-21 RX ADMIN — LIDOCAINE HYDROCHLORIDE 1 APPLICATION: 20 JELLY TOPICAL at 08:36

## 2024-06-21 NOTE — BRIEF OP NOTE (RAD/CATH)
INTERVENTIONAL RADIOLOGY PROCEDURE NOTE    Date: 6/21/2024    Procedure:   Procedure Summary       Date: 06/21/24 Room / Location: [facility] Interventional Radiology    Anesthesia Start:  Anesthesia Stop:     Procedure: IR GASTROSTOMY (G) TUBE CHECK/CHANGE/REINSERTION/UPSIZE Diagnosis:       Dislodged gastrostomy tube      (patient pulled tube out yesterday, last changed 12/8 16fr)    Scheduled Providers:  Responsible Provider:     Anesthesia Type: Not recorded ASA Status: Not recorded            Preoperative diagnosis:   1. Dislodged gastrostomy tube         Postoperative diagnosis: Same.    Surgeon: Patti Mcallister MD     Assistant: None. No qualified resident was available.    Blood loss: 0    Specimens: 0     Findings: exchange of temporary nguyen in G tube tract for new 16 [place]ic gastrostomy    Complications: None immediate.    Anesthesia: local

## 2024-06-21 NOTE — DISCHARGE INSTRUCTIONS
"[facility]  Interventional Radiology  [phone]    TUBE CARE INSTRUCTIONS    Care after your procedure:    Resume your normal diet. Small sips of flat soda will help with nausea.    1. The properly functioning catheter should be forward flushed once (1x) daily with 10ml of normal saline using clean technique. You will be given a prescription for flushes.     To flush the tube, clean both connections with alcohol swab.Twist off the drainage bag/ bulb tubing and twist the saline syringe into the drainage tube and flush. Remove the syringe and twist the drainage bag / bulb tubing tubing back on.    2. The drainage bag/bulb may be emptied as necessary. Keep a record of the amount of fluid you drain from your tube. This should be done with clean technique as well.     3. A fresh dressing should be applied daily over the tube insertion site.     4. As the tube is secured to the skin with only a suture,try not to pull on your tube. Tub baths are not permitted. Showers are permitted if the patient's skin entry site is prevented from getting wet. Similarly, washcloth \"baths\" are acceptable.     Contact Interventional Radiology at [phone] if:    1. Leakage or large amounts of liquid around the catheter.    2. Fever of 101 degrees lasting several hours without other obvious cause (such as sore throat, flu, etc).    3. Persistent nausea or vomiting.    4. Diminished drainage, which may be associated with pressure or pain. Or when the drainage from your tube is less than 10mls for 48 hours.    5. Catheter pulled back or falls out.  "

## 2024-06-27 ENCOUNTER — TELEPHONE (OUTPATIENT)
Age: 44
End: 2024-06-27

## 2024-06-27 NOTE — TELEPHONE ENCOUNTER
Please see if this was a typical seizure for her.  Was it a single seizure or cluster?  Any possible provoking factors such as missed meds, illness/infection?

## 2024-06-27 NOTE — TELEPHONE ENCOUNTER
06/27/24    Pt Nurse Miss. Espinoza from [facility] called office today to Update Pt Neurologist that Pt had a Seizure today 06/27/24 and “Protocol were Adm and were Affected” (Miss. Espinoza STATED).    I asked Miss. Espinoza if Pt was ok and if she needed to speak to a NURSE.     Miss. Espinoza stated that Pt was ok and that she just wanted for Pt Neurologist to know the Updates of Pt.       Any questions, please contact Nurse Olga @ [phone].  Thank You.

## 2024-06-27 NOTE — TELEPHONE ENCOUNTER
Noted, sounds like typical seizure for her, potentially impacted by sleep deprivation.  No changes advised at this time.
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
## 2024-07-18 ENCOUNTER — OFFICE VISIT (OUTPATIENT)
Dept: NEUROLOGY | Facility: CLINIC | Age: 44
End: 2024-07-18
Payer: MEDICARE

## 2024-07-18 VITALS
WEIGHT: 99.4 LBS | BODY MASS INDEX: 19.52 KG/M2 | SYSTOLIC BLOOD PRESSURE: 100 MMHG | HEIGHT: 60 IN | DIASTOLIC BLOOD PRESSURE: 62 MMHG | OXYGEN SATURATION: 95 % | TEMPERATURE: 97.4 F | HEART RATE: 91 BPM

## 2024-07-18 DIAGNOSIS — G40.812 NONINTRACTABLE LENNOX-GASTAUT SYNDROME WITHOUT STATUS EPILEPTICUS (HCC): Primary | ICD-10-CM

## 2024-07-18 DIAGNOSIS — Z96.89 S/P PLACEMENT OF VNS (VAGUS NERVE STIMULATION) DEVICE: ICD-10-CM

## 2024-07-18 DIAGNOSIS — G40.909 INTELLECTUAL DISABILITY WITH EPILEPSY (HCC): ICD-10-CM

## 2024-07-18 DIAGNOSIS — F79 INTELLECTUAL DISABILITY WITH EPILEPSY (HCC): ICD-10-CM

## 2024-07-18 PROCEDURE — 99214 OFFICE O/P EST MOD 30 MIN: CPT | Performed by: PHYSICIAN ASSISTANT

## 2024-07-18 NOTE — PROGRESS NOTES
Patient ID: Maira Bull is a 44 y.o. female.    Assessment:  Ms. Maira Bull is a 44 y.o. woman with Intractable LGS, frequent tonic seizures (especially during sleep), and intermittent witnessed tonic seizures. 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 She likely has more frequent tonic seizures during sleep, based on prior continuous EEG monitoring studies. Her EEG studies show both generalized onset and potential risk for focal onset seizures. She has variable degree of wakefulness and non-standard sleep cycle.      There has been a slight reduction in seizure frequency since clobazam was increased earlier this year.  She had 2 seizures on 6/26 in the setting of some sleep deprivation.  We can continue to monitor on current doses. There is low probability that further medications will improve current seizure control, however if seizure frequency increases, we could consider adding Xcopri (cenobamate).    Facility will be faxing AED level lab results which were completed in April but our office never received these.      Her VNS battery life today is quite low at 8-15% (it had been at 25-50% for the past year, including when checked 3 months ago).  Placing ASAP referral to neurosurgery for generator replacement.  Will follow up to ensure this gets done in a timely manner.     Plan:   Medications are given by PEG tube  1 - Continue Epidiolex 100mg/mL 5.0 mL (500mg) every 12 hours  2 - Continue Briviact 50mg tab one tab every 12 hours  3 - Continue with Topiramate 50mg tabs give 5 tabs (250mg) every 12 hours  4 - Continue Banzel 400mg give 4 tabs every 12 hours  5 - Continue Clobazam 10mg tab give 1/2 tab (5mg) in the AM, and one tab (10mg) at bedtime  6 - Continue Lacosamide 200mg tab one tab every 12 hours.  7 - give diazepam 5mg/mL 1 mL as needed for seizure last more than 3 minutes and repeat second dose for seizure lasting more than 10 minutes  8 - Call the office  if there is increase in seizure frequency. Could consider adding Xcopri.  9 - follow-up in 3 months with Dr. España as scheduled   10 - ASA referral to neurosurgery for VNS battery change        Diagnoses and all orders for this visit:    Nonintractable Lennox-Gastaut syndrome without status epilepticus (HCC)  -     Ambulatory referral to Neurosurgery; Future    Intellectual disability with epilepsy (HCC)    S/P placement of VNS (vagus nerve stimulation) device  -     Ambulatory referral to Neurosurgery; Future           Subjective:    SCOTTY Bull is a 44 y.o.female here for follow-up evaluation of intractable epilepsy in the setting of LGS.      Interval History 7/18/2024  She was last seen on 4/18/24.  No changes made to AEDs.  VNS battery 25-50%    There was a call to our office on 6/27/24 reporting seizures on 6/26.  She had a typical seizure and VNS was swiped x 2.  Seizure aborted, but then began seizing again after about 1 min.  After 3 min, 1mL diazepam was given, then VNS was swiped, all seizing stopped.  Facility reported that the facility had lost power the night before and staff had to wake up the patients to move everyone around, and so she did not get much sleep, and then woke up early as well.  No med changes were made.    She is here today with YUSEF Wilkinson, from her facility.  I called and spoke with ZEE Correa.  She reports there were the seizures on 6/26 that were documented, and then before that, the last documented seizures were 4/26, was given Diazepam for a single seizure lasting >3min, aborted with Diazepam.  No change in her behavior, not overly sedated, at her baseline.   She also reproted that she did have AED levels back in April, however our office has not received these yet.      AED/side effects/compliance:  Banzel 400mg give 4 tabs twice a day   Topiramate 250mg tab twice a day   Epidiolex 500mg twice a day  Briviact 10mg/mL 50mg twice a day  Lacosamide 200-200  Onfi 5mg AM,  10mg at bedtime     Event/Seizure semiology:  Seizure semiology:  She was described to have drop attacks or spells with grunting sounds and falling to the floor.   Her nurse commented on episodes of spasms or myoclonic jerking of the right arm.   Generalized convulsions  Tonic seizures of eyes rolling back (mostly during sleep)     Prior Epilepsy History:  Collective epilepsy history 11/6/2014 was previously evaluated by Dr. Harper including a hospitalization in February 2013 for status epilepticus, in the setting of missed doses of AEDs due to refusal to eat. She subsequently required anesthetic induced coma to stop her seizures and PEG tube was placed. She was seen almost every month for management of her seizures up until November 2013. She has medically refractory seizures and had a VNS placed possibly in the early 2000s and a generator changed in 2011. Unknown AEDs that were tried but felbamate is listed as an allergy. In 2011, her AEDs were clonazepam, levetiracetam, Depakote and Lamictal. Dr. Harper had started Banzel in 2011. In 2012, Onfi was added with the reduction of clonazepam. There were difficulty with documenting seizure frequency; but seizures were no longer daily occurrences but there were clusters of seizures. There would be good periods and bad periods of seizure frequency. Dr. Harper had been increasing the duty cycle of the VNS over the course of 2012 to 2013. Sometime between August 2013 and October 2013, topiramate was introduced. She was in status epilepticus in 2013, she was on Keppra, Banzel, Onfi, and Lamictal. She had an EEG at some point that showed multifocal spike-wave and background attenuation. She has intermittent agitation and day time somnolence. When she was hospitalized for status epilepticus, she was started on methylphenidate to help wake her up.      Intake history November 2014:  VPA level 127. Her Valproic Acid dose was previously 250mg q6hrs based on Dr. Harper's notes. Since  July 2014, she has been receiving VPA 500mg q6hrs. She has not been hospitalized since September 2013. She was previously ambulatory with therapy. She spends most of her time sleeping for the past couple of months. She has also been receiving lactulose for elevated ammonia levels.   (older drugs VPA, LVT, LMG, newer drugs added on since 2011 RFN, Onfi, TPM)      Summary of 2015:  We decreased VPA from TDD 2000mg to 1000mg with increased agitation/combativeness, alternating days of exhaustion (no behavioral specialist has been involved). are randomly reported by multiple staff members. Seizures are typically reported by CNA's or her one to one on the 3PM to 11PM shift. Seizures are described a brief events of eyes rolling back and arms going up in the air, followed by hair pulling or slapping herself. These seizures were reported as either sporadic or every other day episodes. There have been no documented grand mal seizures or drop attacks. She refuses to wear safety helmet, rips at her hair, and attempts to flip herself out of her chair and bed. Maira can walk briefly but walks on her toes, she frequently will allow herself to fall down when she does not want to walk.  It does not appear that methylphenidate has been useful in maintaining her level of wakefulness during the day.    VNS generator change on 11/3/2015. The Model 103 (serial #[id number]) was replaced with [employer] Model 105, serial #[id number]. Weaned off of levetiracetam due to multiple medications and agitation; by the end of 2016, she was down to -500.       Summary of 2016:  Started with RFN [id number], -250, CLB 0.5-0.5-25,  QID, -200 attempted to wean off of LEV and reduced the dose of VPA given that there were no reports of seizures and she was sedated, along with elevated ammonia levels. It was unclear as to how effective LEV was for her seizure control. When she missed a dose of TPM in September 2016, she had multiple  seizures. We attempted to compensate for increase in seizures by increasing Onfi to 20-20; however, it seems that it made her more sedated.      Summary of 2017  RFN [id number], -250, Onfi 20-20, -200,  q8hrs. She has been more somnolent. She continues to have tonic seizures when she is asleep, eyes rolling body, arms will tense up with some twitching, last a few seconds, but will recur. There are no seizures during the daytime. We attempted to wean off of VPA due to ammonia / somnolence; but there was an increase in tonic seizures (tonic stiffness, eyes rolling upwards, and subtle arm (right?) jerking).      Hospital admission 10/10-10/26/2017, due to seizures in setting of infection, was put on continuous video EEG monitoring to determine her seizure type, seizure frequency, and rapid medication adjustments. Rapid med changes: d/c'ed lamotrigine, restarted levetiracetam, attempted discontinuation of valproic acid (more seizures, so restarted), attempted reduction in Onfi, and starting Fycompa. The patient's seizures were mostly based during sleep, tonic seizures.      Summary of 2018  Started with RFN [id number], PER 8, LEV 2097-9513, CLB 5-15, -250,  TID. Due to excessive somnolence Onfi was weaned off. She was on continuous EEG monitoring when she was in hospital for PNA that showed very frequent tonic seizures during sleep. VPA is causing hyperammoniemia. She has had more admissions for somnolence / lethargy, VPA was reduced for transaminitis. She was tested for inborn error of metabolism with plasma amino acid, urine organic acid, and acylcarnitine levels. There were nonspecific elevations in some amino acid or organic acid but no pattern consistent with a specific inborn error of metabolism. Elevated carnitine level was consistent with supplementation. Higher doses of phenobarbital may also contribute to sedation, phenobarbital was reduced to 20mg but on follow-up she was on  20mL of oral solution (80mg / day).     There is variable reporting in the frequency of tonic seizures (these are the eyes are rolling back and shaking, then stop, then happen again in 10-15 minutes). Her level of alertness is also variable with erratic sleep cycle.     Summary 2019  We started the year with  TID, RFN [id number], -200, LEV 6332-3965, PER 10, PB 40 qHS. In December 2018, Hospital admission for septic shock and aspiration PNA. She had an EEG that captured recurrent tonic seizures during sleep but this is consistently found in all of her other EEG studies. Another hospital admission January 2019 for recurrent convulsive seizures.  Due to sedation Phenobarbital was weaned off and Fycompa was increased to 10mg at bedtime. TPM increased to 250-250. Tried to wean off of VPA due to ammonia levels. We started Epidiolex in the Spring 2019, which seems to have improved seizure control.     January 2019 - Her CNA reports that Maira is no longer Maira, she is essentially bed ridden. She does not eat and does not walk. Maira usually participate in activities, walking, and eating food (if she was at a [employer] she would be able to eat a hamburger without difficulty). I was able to speak to her father Oneil and he reported that he too saw a significant decline in mental status. There was a phone call from [facility] reporting an increase in seizure activity (brief episodes tonic-myoclonic jerking); but subsequent reports did not notice an increase in activity.      Weaned off of phenobarbital, suspecting that it was causing elevated transaminases. There is variable periods of wakefulness and alertness; seizures are reported sporadically, sometimes she seems to have clusters of seizures (during an office visit there were about 8 tonic seizures lasting 15-20 seconds). We weaned her off of valproic acid and increased Epidiolex.   June 2019, hospitalized for status epilepticus (multiple  clinical seizures, every 5-6 minutes, associated with apnea). Continuous EEG monitoring showed very frequent 15-30 seconds tonic seizures , whether this is different from her baseline is difficult to determine. Since she was on continuous EEG monitoring a number of medication trials were given. At one point it seems that lorazepam and more levetiracetam had improved seizures. Her tube feed was adjust to more protein and lower carbohydrates.  2019 - multiple phone calls regarding recurrent seizures (generalized shaking, tonic body rigidity and clonic activity)     Summary 2020  RUF [id number], -250, LEV 1000 q8hr, -400, JESSICA 50-50, CLB 5 qHS. Replacement of her VNS in December 2019, went from M105 to  model.  Seizure reporting had been sporadic. Despite medication adjustments, there has been no improvement in degree of wakefulness, nonverbal, calls out at times. She does not do much.  Transitioned LEV to Brivaracetam. Increased clobazam to 10mg at bedtime on 5/28/2020. Unclear if there has been improvement in seizure frequency; but there were no admissions to hospital for seizures.     Summary 2021  RUF [id number], -250, -400, JESSICA 75-75, CLB 5-10. She was admitted to Hospital on 2/2/2021 for multiple seizures in one day. Onfi was increased with an extra 5mg tab in AM. With the increase in Onfi, there has been no report of excessive sedation. She continues to have stretches of non-24 hour sleep cycle. She has periods when she appears to be active, smiling and laughter and periods when she has no interactions with those around her.  Her seizures are very difficult to notice, unless someone is with her. We had tried to reduce her Brivaracetam dose as there was no clear improvement at higher dose and she has been losing weight. We started Vimpat at the end of the year.     Summary 2022  RUF [id number], -250, -400, BRV 50-50, CLB 5-10.   Often seems to be sleepy. She is sometimes  on a play mat, she mostly lays there, sometimes she plays with her toys. She may scream or yell throughout the day. One time she was able to say her name and clap her hands and laugh at something. She has moments of increased alertness and awareness.   She continues to have occasional witnessed seizures (body and head rigidity, eyes rolling up, sometimes mild twitching of the arms). The last time a rescue medication was needed was back in September 2021.   We tried to reduce her dose of clobazam to reduce sedation and replace it with lacosamide.     Summary 2023  RUF [id number], -250, -400, BRV 50-50, CLB 10, -150. She continues to have episodic seizures. Seizures involved whole body shaking, eyes fixed, drooling. Her clobazam was increased when there was a cluster of seizures. She has variable degree of wakefulness, sometimes she is wild and screaming, sometimes she is lethargic. Most of her seizures are the tonic seizure type. No report of tonic-clonic seizures.      Interval History 1/25/2024  She was last seen by Li Vance on 8/16/2023 - there were 2 documented seizrues, body tenses up, face read, drooling. Seizures involve tense body, arms flexed, face red, and labored breathing.   No changes were made.     Since her last visit, she had three seizures in December (12/3 and 12/25).  She was admitted to hospital on 12/7 due to dislodged G-tube, she was not able to get her antiseizure medications while she did not have a G-tube, so she had a cluster of 6-7 generalized tonic clonic seizures (upper body stiffening and shaking and unresponsiveness).  She had an EEG study that showed multiple tonic seizures.   [place] -   She had seizures on 11/13 needed diazepam to stop seizure.  She had seizure-like activiey on 10/29 2 episodes within 3 minute (lasted 10 seconds, tense and tight, with repetitive shaking of arms and face turning red).    Interval History 4/18/2024  She was last seen by Dr. España  on 24. Epidiolex was increased and facility was asked to get her blood work done to check AED levels.     There was a call to our office on 3/22/24 from her facility reporting patient had multiple seizures on 3/16. There were no changes to medications, new infections etc. The seizures were the same, except staff member reported she would “call out” during these seizures, which she says was new. Dr. España advised getting blood work done to check AED levels (these were ordered in January) and also increasing clobazam to 5mg AM and 10mg PM.     There was a call to the office yesterday, again reporting patient had a cluster of seizures on 4/15/24, that were on and off lasting 30 min total, given diazepam and magnet was swiped. Reported as her usual type of seizure. Patient was sleeping when this happened.      She is here today with YUSEF Giordano, who has worked with Maira for several years. Giuliana reports Maira has been “at her baseline”. She has not seen excessive sedation more than normal since clobazam was increased. She feels seizures are “the same, but maybe a little less”.  Labs were sent with the patient, collected 24. Unfortunately, AED levels were not done. These labs were likely ordered by another provider (included CMP, CBC, A1C). CBC and CMP normal.       Special Features  Status epilepticus: yes  Self Injury Seizures: No  Precipitating Factors: menstrual cycle??     Epilepsy Risk Factors:  Abnormal pregnancy: unknown  Abnormal birth/: unknown  Abnormal Development: unknown developmental history  Febrile seizures, simple: unknown  Febrile seizures, complex: unknown  CNS infection: No  Mental retardation: Yes  Cerebral palsy: Yes  Head injury (moderate/severe): possibly anoxic brain injury  CNS neoplasm: No  CNS malformation: No  Neurosurgical procedure: No  Stroke: No  Alcohol abuse: No  Drug abuse: No  Family history Sz/epilepsy: unknown     Prior AEDs:  Rufinamide  Clobazam (excessive  sedation)  Clonazepam  Levetiracetam (unclear if beneficial)  Lamotrigine (unclear if beneficial)  Topiramate (missed doses caused breakthrough convulsive type of seizures)  Valproate (elevated ammonia levels)  Fycompa (excess sedation)  Felbamate (listed as a drug allergy)  Phenobarbital (contributing to sedation)  Epidiolex (seems to help the most)  Brivaracetam  Lacosamide     Prior workup:  x   Imaging:  CT head 2/21/2013, 9/7/2018  No acute intracranial pathology     3/20/2019  MRI brain w/wo contrast  Normal MR brain study  Symmetric hippocampal formations     EEGs:  Continuous video EEG monitoring 10/13 - 10/15/2017  Frequent generalized spike/polyspike-slow wave complexes during sleep  At times there are independent right more than left midtemporal spikes and bitemporal spikes     Innumerable generalized tonic seizures during sleep, generalized 1 Hz period discharges, that would become continuous for 30 seconds or generalized rhythmic alpha-beta discharges, associated with patient becoming rigid, tonic posturing with arms elevated and tense with subtle jerking of the upper body, eyes opening with upward deviation.  Ictal patterns:  1 - Seconds of diffuse electrodecrement then paroxysmal fast activity followed by 2Hz spike wave discharges (clinically accompanied by posturing of the arms, then clonic jerking)     Continuous video EEG monitoring 10/18 - 10/25/2017  Diffuse background slowing with bursts of arrhythmic generalized spike wave discharges, with shifting lateralization, and independent left and right temporal spikes  Mild eyelid myoclonia associated with brief bursts of 2-2.5 Hz generalized discharges  Tonic seizures with eelctrodecrement  There were innumerable tonic seizures; however by 10/25/2017 the frequency of these seizures did decrease in frequency.     Continuous video EEG monitoring 12/1-12/5/2017  There are innumerable tonic seizures that are generally less than one minute in duration  (30-40 seconds), these consists of subtle eye opening and tonic stiffening of arms, if longer then whole body stiffening with clonic jerking movements. These almost always occur exclusively out of sleep. These consist of 2-2.5 Hz generalized spike-slow wave complexes with generalized attenuation of activity (desynchronization) or paroxysmal fast activity. Per 24 hours count of seizures could be .     12/19/2018 routine study  Frequent recurrent tonic seizures associated with paroxysmal generalized fast activity then generalized rhythmic discharges associated with eyes upward deviation, and myoclonic/clonic jerking of the upper body, excess beta activity.     6/28-7/3/2019 continuous video EEG monitoring  Frequent clusters of tonic seizures (body rigidity, head flexions, eyes go up with dysconjugate gaze, and clonic activity of the arms for a few seconds), these are associated with GPFA and rhythmic spike-slow waves.  There are also bilateral temporal focal epileptiform discharges      The following portions of the patient's history were reviewed and updated as appropriate: current medications, past family history, past medical history, past social history, past surgical history, and problem list.         Objective:    Blood pressure 100/62, pulse 91, temperature (!) 97.4 °F (36.3 °C), temperature source Temporal, height 5' (1.524 m), weight 45.1 kg (99 lb 6.4 oz), SpO2 95%.    Physical Exam  Constitutional:       Comments: Facial dysmorphia of intellectual disability.  Lying on stretcher, initially sleeping, but easily aroused when I started talking directly to her and examining her.  Playing with her toy   Eyes:      Extraocular Movements: EOM normal.      Pupils: Pupils are equal, round, and reactive to light.   Psychiatric:      Comments: Overall calm, quiet          Neurological Exam  Mental Status    Patient is non-verbal, unable to assess language, memory, attention etc .    Cranial Nerves  CN III, IV,  VI: Extraocular movements intact bilaterally. Pupils equal round and reactive to light bilaterally.  CN VII: Muscles of facial expression are symmetric.    Motor   No abnormal involuntary movements.  Unable to perform confrontational testing.  On stretcher has legs flexed, but able to resist passive motion of the legs.  She pushes me away with her arms, has a good grasp on her toy.    Sensory  Unable to assess due to intellectual disability .    Reflexes  Not assessed.    Coordination    Patient unable to follow directions for testing .    Gait    Not assesed, on a stretcher.    Neurology/Epilepsy Procedure Note  VNS Interrogation Only     Model:   S/N: [id number]  Date of implant: 12/18/2019     Battery indicator 8-15% IFI     Current settings:  Output Current 2.0 mAmp   Signal Frequency 20 Hz   Pulse Width 500 microsec   Signal On Time 30 sec   Signal Off Time 1.1 min      AutoStimulation settings:  AutoStim Output 2.0 mAmp   Pulse Width 500 msec   AutoStim On Time 30 sec      Magnet settings:  Mag Output 2.25 mAmp   Pulse Width 500 microsec   Mag On Time 60 sec        ROS:    Review of Systems   Constitutional: Negative.  Negative for fatigue and fever.   HENT: Negative.  Negative for hearing loss, tinnitus and trouble swallowing.    Eyes:  Negative for photophobia, pain and visual disturbance.   Respiratory: Negative.  Negative for cough and shortness of breath.    Cardiovascular: Negative.  Negative for chest pain and palpitations.   Gastrointestinal:  Negative for constipation, diarrhea, nausea and vomiting.   Endocrine: Negative.    Genitourinary: Negative.  Negative for difficulty urinating and urgency.   Musculoskeletal: Negative.  Negative for back pain, gait problem and neck pain.   Skin: Negative.  Negative for rash.   Neurological:  Positive for seizures. Negative for dizziness, tremors, syncope, speech difficulty, weakness, numbness and headaches.   Hematological: Negative.    Psychiatric/Behavioral:   Negative for decreased concentration and sleep disturbance. The patient is not nervous/anxious.

## 2024-07-18 NOTE — PATIENT INSTRUCTIONS
Plan:   Medications are given by PEG tube  1 - Continue Epidiolex 100mg/mL 5.0 mL (500mg) every 12 hours  2 - Continue Briviact 50mg tab one tabe every 12 hours  3 - Continue with Topiramate 50mg tabs give 5 tabs (250mg) every 12 hours  4 - Continue Banzel 400mg give 4 tabs every 12 hours  5 - Continue Clobazam 10mg tab give 1/2 tab (5mg) in the AM, and one tab (10mg) at bedtime  6 - Continue Lacosamide 200mg tab one tab every 12 hours.  7 - give diazepam 5mg/mL 1 mL as needed for seizure last more than 3 minutes and repeat second dose for seizure lasting more than 10 minutes  8 - Call the office if there is increase in seizure frequency  9 - ASAP referral to neurosurgery for VNS battery change due to VNS battery life is currently 8-15%.  Neurosurgery will call facility to schedule   10 - follow-up in 3 months as scheduled with Dr. España

## 2024-07-29 ENCOUNTER — OFFICE VISIT (OUTPATIENT)
Dept: NEUROSURGERY | Facility: CLINIC | Age: 44
End: 2024-07-29
Payer: MEDICARE

## 2024-07-29 ENCOUNTER — TELEPHONE (OUTPATIENT)
Dept: NEUROSURGERY | Facility: CLINIC | Age: 44
End: 2024-07-29

## 2024-07-29 VITALS — BODY MASS INDEX: 19.44 KG/M2 | HEIGHT: 60 IN | RESPIRATION RATE: 16 BRPM | WEIGHT: 99 LBS

## 2024-07-29 DIAGNOSIS — G40.812 NONINTRACTABLE LENNOX-GASTAUT SYNDROME WITHOUT STATUS EPILEPTICUS (HCC): ICD-10-CM

## 2024-07-29 DIAGNOSIS — Z96.89 S/P PLACEMENT OF VNS (VAGUS NERVE STIMULATION) DEVICE: ICD-10-CM

## 2024-07-29 PROCEDURE — 99204 OFFICE O/P NEW MOD 45 MIN: CPT | Performed by: SPECIALIST

## 2024-07-29 NOTE — PROGRESS NOTES
Ambulatory Visit  Name: Maira Bull      : 1980      MRN: [id number]  Encounter Provider: Viola Dennis MD  Encounter Date: 2024   Encounter department: [facility] NEUROSURGICAL [facility]    Assessment & Plan   Ms. Maira Bull is a 43 y.o. woman with Intractable LGS, frequent tonic seizures (especially during sleep), and intermittent witnessed tonic seizures.  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  She likely have more frequent tonic seizures during sleep, based on prior continuous EEG monitoring studies.  Her EEG studies show both generalized onset and potential risk for focal onset seizures.  She is here today because her vagal nerve stimulator battery is depleted down to 15%.  She was referred to us for battery revision.  Goal today was to get an informed consent from a family member.  The patient is not able to cooperate due to her mental status and this informed consent session.  We are trying to locate some family member that can give consent for her to undergo vagal nerve battery replacement.  She was accompanied by one of the aides from the facility she is at, but she does not know much about her and is unable to give consent.  Nevertheless, we did go ahead and do a full history and physical today.  We will try to track down family in the meantime to obtain an informed consent.  It would likely need to be over the phone.  I will explained to them that the risks of the surgery are not very high, but things like superficial or deep-seated infection, heart rate abnormalities, damage to the vagal nerve stimulator wire, hoarseness, and other problems are possible with this battery change.  I will also explain to them that she could also have wound healing problems that require further surgeries to correct.     Surgery to be performed.    Replacement of left subclavicular vagal nerve stimulator battery.     Plan     Surgery for  battery replacement at [facility]  Preop labs  Preop medical clearance  Hold all NSAID's and blood thinners 7 days before surgery  Get a hold of family to obtain informed consent.     Luis Dennis      History of Present Illness     Ms. Maira Bull is a 43 y.o. woman with Intractable LGS, frequent tonic seizures (especially during sleep), and intermittent witnessed tonic seizures.  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  She likely have more frequent tonic seizures during sleep, based on prior continuous EEG monitoring studies.  Her EEG studies show both generalized onset and potential risk for focal onset seizures.      Review of Systems  Unable to obtain ROS: Patient nonverbal      Past Medical History:   Diagnosis Date    ADHD     Anoxic brain damage (HCC)     Autistic disorder     Breakthrough seizure (HCC) 8/1/2019    Dysphagia     Dysphagia, oropharyngeal phase     Gastrostomy tube dependent (HCC)     14 Fr as of 05/19/2020    Hyperammonemia (HCC)     Hyperkeratosis     Hypotension     Intellectual disability     Lennox-Gastaut syndrome with tonic seizures (HCC)     Lethargy     Liver enzyme elevation     Onychomycosis     Osteoporosis     Osteoporosis        Past Surgical History:   Procedure Laterality Date    ABDOMINAL SURGERY      CARDIAC PACEMAKER PLACEMENT      vns implant l chest    IR GASTROJEJUNOSTOMY (GJ) TUBE PLACEMENT  12/8/2023    IR GASTROSTOMY (G) TUBE CHECK/CHANGE/REINSERTION/UPSIZE  6/21/2024    IR GASTROSTOMY TUBE PLACEMENT  11/21/2019    IR THORACENTESIS  12/17/2018    JEJUNOSTOMY FEEDING TUBE      history of - most recently, PEG tube    PEG TUBE PLACEMENT      [place] INSJ/RPLCMT CRANIAL NEUROSTIM PULSE GENERATOR Left 12/18/2019    Procedure: REPLACEMENT IMPLANTABLE PULSE GENERATOR FOR VAGAL NERVE STIMULATOR, LEFT CHEST;  Surgeon: Patrick Canales MD;  Location: [facility];  Service: Neurosurgery         Current Outpatient Medications:     acetaminophen  (TYLENOL) 325 mg tablet, Take 650 mg by mouth every 4 (four) hours as needed for mild pain or fever, Disp: , Rfl:     bisacodyl (DULCOLAX) 10 mg suppository, Insert 10 mg into the rectum daily, Disp: , Rfl:     brivaracetam (BRIVIACT) 50 MG TABS tablet, Give 1 tablet via PEG tube q12 hours, Disp: 60 tablet, Rfl: 5    cannabidiol (Epidiolex) 100 mg/ml SOLN, 5 mL (500 mg total) by Per G Tube route 2 (two) times a day, Disp: 300 mL, Rfl: 5    cloBAZam (ONFI) 10 MG tablet, Give PEG-tube half a tab in the morning and one tab at bedtime, Disp: 45 tablet, Rfl: 5    diazepam (VALIUM) 5 MG/ML solution, If the patient has a seizure lasting more than 3 minutes then give 1mL by PEG tube, may repeat 1mL if she continues to have seizure for more than 10 minutes., Disp: 30 mL, Rfl: 1    rufinamide (BANZEL) 400 mg tablet, 4 tablets (1,600 mg total) by Per G Tube route every 12 (twelve) hours, Disp: 240 tablet, Rfl: 5    topiramate (TOPAMAX) 50 MG tablet, 5 tablets (250 mg total) by Per G Tube route every 12 (twelve) hours, Disp: 300 tablet, Rfl: 5    VITAMIN D PO, Take 1,000 mg by mouth in the morning Given in her G-tube, Disp: , Rfl:     lacosamide (VIMPAT) 200 mg tablet, Give 1 tablet via PEG tube q12 hours (Patient not taking: Reported on 7/29/2024), Disp: 60 tablet, Rfl: 5    Probiotic Product (ALEXANDER-BID PROBIOTIC PO), 1 tablet by Per G Tube route daily   (Patient not taking: Reported on 7/29/2024), Disp: , Rfl:    Objective     There were no vitals taken for this visit.    Allergies:  Allergies        Allergies   Allergen Reactions    Felbamate           Family history:  Family History[]Expand by Default   History reviewed. No pertinent family history.     There is no family history of seizure, epilepsy or developmental delay.       Social History  Living situation:  Resident of [facility] owners now called the [facility]   reports that she has never smoked. She has never used smokeless tobacco. She reports that she  does not currently use alcohol. She reports that she does not currently use drugs.   Imaging:    No relevant imaging to review    Physical Exam  Administrative Statements       General exam   Temperature 97.9 °F (36.6 °C), temperature source Temporal, resp. rate 14, height 5' (1.524 m), weight 43.4 kg (95 lb 9.6 oz).  Appearance:  facial dysmorphia of intellectual disability, wide awake, relatively calm during the visit; she is playing with her toy  Cardiovascular: regular rate and rhythm  Pulmonary: not assessed due to positioning    Abdomen: nondistended, bowel sounds are present  Extremities: there is no peripheral edema     HEENT:  moist mucus membranes, poor dentition, dysconjugate gaze    Fundoscopy: not assessed     Mental status  Orientation:  awake does not seem to track noise, but will hold onto her toy  Due to her intellectual disability, she is nonverbal so Fund of Knowledge, Attention and Concentration, and Memory cannot be tested  Language:  moans and make nonsensical vocalizations     Cranial Nerves  CN 1: not tested  CN 2:  pupils are symmetric, round, and equally reactive to light    CN 3, 4, 6:  no nystagmus is present  CN 5:not assessed  CN 7:muscles of facial expression are symmetric  CN 8: not assessed  CN 9,10: not assessed  CN 11: not assessed  CN 12:not assessed     Motor:  Bulk, Tone: normal bulk, normal tone  Pronation: not assessed  Strength: Unable to assess limb strength by confrontational testing.  She holds her legs flexed at the knees and on her feet on the stretcher with symmetric strength to resist passive extension of the legs  She has good  on her hands, while holding her toys and resists me taking away her toy  Abnormal movements: None     Sensory:  Lighttouch: not assessed     Coordination:  Unable to test     Reflexes:   Reflexes were not assessed

## 2024-07-29 NOTE — TELEPHONE ENCOUNTER
7/29/24 patient was checked out she was transported to our office and is currently at [facility]  nursing, consult note and AVS was given to Medical assistant Malinda who was with patient. Called facility and spoke to Nurse Lopez on the East Wing at [phone] and advised her that patient will need a battery replacement for vagal nerve stimulator and that consents need to be signed prior she stated she will reach out to patients son to coordinate. Orders, AVS and consult note was faxed to Nurse Lopez at fax# [phone].

## 2024-08-01 ENCOUNTER — TELEPHONE (OUTPATIENT)
Age: 44
End: 2024-08-01

## 2024-08-01 NOTE — TELEPHONE ENCOUNTER
Received call from [facility], she reports patient consent must come from father however he has been in and out of the hospital himself. She wanted to share with us the phone number that they have been able to reach him on, [phone].

## 2024-08-05 NOTE — TELEPHONE ENCOUNTER
"Per office note with Dr Dennis dated 7/29 states \"Goal today was to get an informed consent from a family member. The patient is not able to cooperate due to her mental status and this informed consent session. We are trying to locate some family member that can give consent for her to undergo vagal nerve battery replacement.\". I called and left message on [employer]il explaining above and requesting return call to obtain consent, also providing my direct line.  "

## 2024-08-06 ENCOUNTER — NURSE TRIAGE (OUTPATIENT)
Age: 44
End: 2024-08-06

## 2024-08-06 ENCOUNTER — HOSPITAL ENCOUNTER (EMERGENCY)
Facility: HOSPITAL | Age: 44
End: 2024-08-08
Attending: EMERGENCY MEDICINE | Admitting: EMERGENCY MEDICINE
Payer: MEDICARE

## 2024-08-06 ENCOUNTER — APPOINTMENT (EMERGENCY)
Dept: CT IMAGING | Facility: HOSPITAL | Age: 44
End: 2024-08-06
Payer: MEDICARE

## 2024-08-06 DIAGNOSIS — T85.9XXA COMPLICATION ASSOCIATED WITH VAGAL NERVE STIMULATOR: ICD-10-CM

## 2024-08-06 DIAGNOSIS — Z45.42 BATTERY END OF LIFE OF VAGUS NERVE STIMULATOR: ICD-10-CM

## 2024-08-06 DIAGNOSIS — G40.919 BREAKTHROUGH SEIZURE (HCC): ICD-10-CM

## 2024-08-06 DIAGNOSIS — E44.0 MODERATE PROTEIN-CALORIE MALNUTRITION (HCC): ICD-10-CM

## 2024-08-06 DIAGNOSIS — E87.6 HYPOKALEMIA: ICD-10-CM

## 2024-08-06 LAB
ALBUMIN SERPL BCG-MCNC: 3.9 G/DL (ref 3.5–5)
ALP SERPL-CCNC: 79 U/L (ref 34–104)
ALT SERPL W P-5'-P-CCNC: 28 U/L (ref 7–52)
ANION GAP SERPL CALCULATED.3IONS-SCNC: 8 MMOL/L (ref 4–13)
AST SERPL W P-5'-P-CCNC: 19 U/L (ref 13–39)
BASOPHILS # BLD AUTO: 0.06 THOUSANDS/ÂΜL (ref 0–0.1)
BASOPHILS NFR BLD AUTO: 1 % (ref 0–1)
BILIRUB SERPL-MCNC: 0.29 MG/DL (ref 0.2–1)
BUN SERPL-MCNC: 17 MG/DL (ref 5–25)
CALCIUM SERPL-MCNC: 9 MG/DL (ref 8.4–10.2)
CHLORIDE SERPL-SCNC: 110 MMOL/L (ref 96–108)
CK SERPL-CCNC: 40 U/L (ref 26–192)
CO2 SERPL-SCNC: 22 MMOL/L (ref 21–32)
CREAT SERPL-MCNC: 0.43 MG/DL (ref 0.6–1.3)
EOSINOPHIL # BLD AUTO: 0.12 THOUSAND/ÂΜL (ref 0–0.61)
EOSINOPHIL NFR BLD AUTO: 2 % (ref 0–6)
ERYTHROCYTE [DISTWIDTH] IN BLOOD BY AUTOMATED COUNT: 13.1 % (ref 11.6–15.1)
GFR SERPL CREATININE-BSD FRML MDRD: 124 ML/MIN/1.73SQ M
GLUCOSE SERPL-MCNC: 139 MG/DL (ref 65–140)
HCT VFR BLD AUTO: 35.8 % (ref 34.8–46.1)
HGB BLD-MCNC: 11.8 G/DL (ref 11.5–15.4)
IMM GRANULOCYTES # BLD AUTO: 0.01 THOUSAND/UL (ref 0–0.2)
IMM GRANULOCYTES NFR BLD AUTO: 0 % (ref 0–2)
LACOSAMIDE SERPL-MCNC: 8.89 UG/ML (ref 1–20)
LACTATE SERPL-SCNC: 1.1 MMOL/L (ref 0.5–2)
LYMPHOCYTES # BLD AUTO: 1.66 THOUSANDS/ÂΜL (ref 0.6–4.47)
LYMPHOCYTES NFR BLD AUTO: 23 % (ref 14–44)
MAGNESIUM SERPL-MCNC: 1.9 MG/DL (ref 1.9–2.7)
MCH RBC QN AUTO: 32.5 PG (ref 26.8–34.3)
MCHC RBC AUTO-ENTMCNC: 33 G/DL (ref 31.4–37.4)
MCV RBC AUTO: 99 FL (ref 82–98)
MONOCYTES # BLD AUTO: 0.59 THOUSAND/ÂΜL (ref 0.17–1.22)
MONOCYTES NFR BLD AUTO: 8 % (ref 4–12)
NEUTROPHILS # BLD AUTO: 4.89 THOUSANDS/ÂΜL (ref 1.85–7.62)
NEUTS SEG NFR BLD AUTO: 66 % (ref 43–75)
NRBC BLD AUTO-RTO: 0 /100 WBCS
PLATELET # BLD AUTO: 204 THOUSANDS/UL (ref 149–390)
PMV BLD AUTO: 11.3 FL (ref 8.9–12.7)
POTASSIUM SERPL-SCNC: 2.8 MMOL/L (ref 3.5–5.3)
PROT SERPL-MCNC: 6.5 G/DL (ref 6.4–8.4)
RBC # BLD AUTO: 3.63 MILLION/UL (ref 3.81–5.12)
SODIUM SERPL-SCNC: 140 MMOL/L (ref 135–147)
TSH SERPL DL<=0.05 MIU/L-ACNC: 0.87 UIU/ML (ref 0.45–4.5)
WBC # BLD AUTO: 7.33 THOUSAND/UL (ref 4.31–10.16)

## 2024-08-06 PROCEDURE — 85025 COMPLETE CBC W/AUTO DIFF WBC: CPT | Performed by: EMERGENCY MEDICINE

## 2024-08-06 PROCEDURE — 84443 ASSAY THYROID STIM HORMONE: CPT | Performed by: EMERGENCY MEDICINE

## 2024-08-06 PROCEDURE — 70450 CT HEAD/BRAIN W/O DYE: CPT

## 2024-08-06 PROCEDURE — 80210 DRUG ASSAY RUFINAMIDE: CPT | Performed by: EMERGENCY MEDICINE

## 2024-08-06 PROCEDURE — 82550 ASSAY OF CK (CPK): CPT | Performed by: EMERGENCY MEDICINE

## 2024-08-06 PROCEDURE — 80235 DRUG ASSAY LACOSAMIDE: CPT | Performed by: EMERGENCY MEDICINE

## 2024-08-06 PROCEDURE — 96361 HYDRATE IV INFUSION ADD-ON: CPT

## 2024-08-06 PROCEDURE — 80299 QUANTITATIVE ASSAY DRUG: CPT | Performed by: EMERGENCY MEDICINE

## 2024-08-06 PROCEDURE — 80053 COMPREHEN METABOLIC PANEL: CPT | Performed by: EMERGENCY MEDICINE

## 2024-08-06 PROCEDURE — 93005 ELECTROCARDIOGRAM TRACING: CPT

## 2024-08-06 PROCEDURE — 36415 COLL VENOUS BLD VENIPUNCTURE: CPT | Performed by: EMERGENCY MEDICINE

## 2024-08-06 PROCEDURE — 83735 ASSAY OF MAGNESIUM: CPT | Performed by: EMERGENCY MEDICINE

## 2024-08-06 PROCEDURE — 99285 EMERGENCY DEPT VISIT HI MDM: CPT

## 2024-08-06 PROCEDURE — 80201 ASSAY OF TOPIRAMATE: CPT | Performed by: EMERGENCY MEDICINE

## 2024-08-06 PROCEDURE — 99285 EMERGENCY DEPT VISIT HI MDM: CPT | Performed by: EMERGENCY MEDICINE

## 2024-08-06 PROCEDURE — 83605 ASSAY OF LACTIC ACID: CPT | Performed by: EMERGENCY MEDICINE

## 2024-08-06 RX ORDER — TOPIRAMATE 25 MG/1
TABLET ORAL
Status: DISPENSED
Start: 2024-08-06 | End: 2024-08-07

## 2024-08-06 RX ORDER — LACOSAMIDE 50 MG/1
200 TABLET ORAL EVERY 12 HOURS SCHEDULED
Status: DISCONTINUED | OUTPATIENT
Start: 2024-08-06 | End: 2024-08-07

## 2024-08-06 RX ORDER — POTASSIUM CHLORIDE 20MEQ/15ML
40 LIQUID (ML) ORAL ONCE
Status: COMPLETED | OUTPATIENT
Start: 2024-08-06 | End: 2024-08-06

## 2024-08-06 RX ORDER — TOPIRAMATE 25 MG/1
25 TABLET ORAL EVERY 12 HOURS SCHEDULED
Status: DISCONTINUED | OUTPATIENT
Start: 2024-08-06 | End: 2024-08-06

## 2024-08-06 RX ORDER — RUFINAMIDE 40 MG/ML
1600 SUSPENSION ORAL EVERY 12 HOURS SCHEDULED
Status: DISCONTINUED | OUTPATIENT
Start: 2024-08-06 | End: 2024-08-08 | Stop reason: HOSPADM

## 2024-08-06 RX ORDER — TOPIRAMATE 100 MG/1
TABLET, FILM COATED ORAL
Status: DISPENSED
Start: 2024-08-06 | End: 2024-08-07

## 2024-08-06 RX ADMIN — TOPIRAMATE 250 MG: 100 TABLET, FILM COATED ORAL at 21:59

## 2024-08-06 RX ADMIN — SODIUM CHLORIDE 1000 ML: 0.9 INJECTION, SOLUTION INTRAVENOUS at 10:49

## 2024-08-06 RX ADMIN — POTASSIUM CHLORIDE 40 MEQ: 1.5 SOLUTION ORAL at 11:08

## 2024-08-06 RX ADMIN — LACOSAMIDE 200 MG: 50 TABLET, FILM COATED ORAL at 21:59

## 2024-08-06 RX ADMIN — BRIVARACETAM 50 MG: 10 SOLUTION ORAL at 21:59

## 2024-08-06 RX ADMIN — RUFINAMIDE 1600 MG: 40 SUSPENSION ORAL at 21:59

## 2024-08-06 NOTE — TELEPHONE ENCOUNTER
JARET--nursing advised facility to bring patient to ED due to constant seizure activity.  I saw her on 7/18/24 and her VNS battery was 8-15%, therefore I sent her urgently to neurosurgery and she was seen by them on 7/29/24.  Per notes, appears they were not able to obtain consent from her family and nothing has been done (per my chart review--neurosurgery did not contact us about this).  Not sure if you have any recommendations?

## 2024-08-06 NOTE — TELEPHONE ENCOUNTER
Patient currently being transferred from [facility] to [facility]. Waiting on Neurosurgery consult to determine VNS battery to be replaced during inpatient period otherwise will call to schedule surgery on outpatient basis.

## 2024-08-06 NOTE — ED NOTES
No seizure activity noted. Incontinent of a large amount of urine cleansed and new linens applied     Minal Granados RN  08/06/24 [id number]

## 2024-08-06 NOTE — TELEPHONE ENCOUNTER
Detailed message left for TIFFANY Richardson regarding prior recommendations. Requested call back if questions. Also advised on the importance of urgent NS evaluation for VNS battery exchange if this is not addressed in ED.

## 2024-08-06 NOTE — ED PROVIDER NOTES
History  Chief Complaint   Patient presents with    Seizure - Prior Hx Of     Per nursing home pt had 10 to 15 seizures today gave 2 doses of 5mg rectal valium to stop seizures and called neurology who wanted pt evaluated       Seizure - Prior Hx Of    This is a 44-year-old female with a history of intractable Lennox Gastaut syndrome, cerebral anoxic injury, intractable epilepsy history of vagus nerve stimulator device, intellectual disability, PEG tube, on multiple antiepileptic drugs who presents to the ER from nursing facility where she resides for evaluation of multiple witnessed seizures today.    History comes from EMS and from medical records.  Patient was noted by staff this morning around 8:40 AM to have at least a dozen episodes of seizure activity lasting 5 to 8 seconds primarily tonic in nature.  Staff reported last seizure noted was Thursday or Friday, several days ago.  A total of 10 mg of rectal Valium was administered.  Per EMS they found the patient at her baseline and report that she is nonverbal and not aware of her surroundings at baseline that they have seen her in the past predominately for tonic type seizures which last a few seconds and are typically jerking behavior.  Patient also has a history of her PEG tube being dislodged in the setting of seizures but this does not seem to be a concern today.  Patient is not noted to have any significant falls or trauma.  She is unable to provide her own history due to chronic/baseline mental status.  Per staff patient has been taking her medications as directed.  They did note that her nerve stimulator was noted to be low battery.  It appears patient was seen in the office by neurosurgery 1 week ago on 7/29/2024 for at which time they were unable to obtain informed consent for a battery change and the patient was sent back to the facility it appears no further updates on this issue have been established.  Facility reports not being contacted and made  aware of this need for consent.    Staff called the neurology office today who advised patient be taken to the ER and also requested an urgent intervention by neurosurgery to have the battery replaced as they feel that this is contributing to her breakthrough seizures.  Prior to Admission Medications   Prescriptions Last Dose Informant Patient Reported? Taking?   Probiotic Product (ALEXANDER-BID PROBIOTIC PO)  Outside Facility (Specify) Yes No   Si tablet by Per G Tube route daily     Patient not taking: Reported on 2024   VITAMIN D PO  Outside Facility (Specify) Yes No   Sig: Take 1,000 mg by mouth in the morning Given in her G-tube   acetaminophen (TYLENOL) 325 mg tablet  Outside Facility (Specify) Yes No   Sig: Take 650 mg by mouth every 4 (four) hours as needed for mild pain or fever   bisacodyl (DULCOLAX) 10 mg suppository   Yes No   Sig: Insert 10 mg into the rectum daily   brivaracetam (BRIVIACT) 50 MG TABS tablet   No No   Sig: Give 1 tablet via PEG tube q12 hours   cannabidiol (Epidiolex) 100 mg/ml SOLN   No No   Si mL (500 mg total) by Per G Tube route 2 (two) times a day   cloBAZam (ONFI) 10 MG tablet   No No   Sig: Give PEG-tube half a tab in the morning and one tab at bedtime   diazepam (VALIUM) 5 MG/ML solution   No No   Sig: If the patient has a seizure lasting more than 3 minutes then give 1mL by PEG tube, may repeat 1mL if she continues to have seizure for more than 10 minutes.   lacosamide (VIMPAT) 200 mg tablet   No No   Sig: Give 1 tablet via PEG tube q12 hours   Patient not taking: Reported on 2024   rufinamide (BANZEL) 400 mg tablet   No No   Si tablets (1,600 mg total) by Per G Tube route every 12 (twelve) hours   topiramate (TOPAMAX) 50 MG tablet   No No   Si tablets (250 mg total) by Per G Tube route every 12 (twelve) hours      Facility-Administered Medications: None       Past Medical History:   Diagnosis Date    ADHD     Anoxic brain damage (HCC)     Autistic disorder      Breakthrough seizure (HCC) 8/1/2019    Dysphagia     Dysphagia, oropharyngeal phase     Gastrostomy tube dependent (HCC)     14 Fr as of 05/19/2020    Hyperammonemia (HCC)     Hyperkeratosis     Hypotension     Intellectual disability     Lennox-Gastaut syndrome with tonic seizures (HCC)     Lethargy     Liver enzyme elevation     Onychomycosis     Osteoporosis     Osteoporosis        Past Surgical History:   Procedure Laterality Date    ABDOMINAL SURGERY      CARDIAC PACEMAKER PLACEMENT      vns implant l chest    IR GASTROJEJUNOSTOMY (GJ) TUBE PLACEMENT  12/8/2023    IR GASTROSTOMY (G) TUBE CHECK/CHANGE/REINSERTION/UPSIZE  6/21/2024    IR GASTROSTOMY TUBE PLACEMENT  11/21/2019    IR THORACENTESIS  12/17/2018    JEJUNOSTOMY FEEDING TUBE      history of - most recently, PEG tube    PEG TUBE PLACEMENT      [facility] INSJ/RPLCMT CRANIAL NEUROSTIM PULSE GENERATOR Left 12/18/2019    Procedure: REPLACEMENT IMPLANTABLE PULSE GENERATOR FOR VAGAL NERVE STIMULATOR, LEFT CHEST;  Surgeon: Patrick Canales MD;  Location: [facility];  Service: Neurosurgery       History reviewed. No pertinent family history.  I have reviewed and agree with the history as documented.    E-Cigarette/Vaping    E-Cigarette Use Never User      E-Cigarette/Vaping Substances    Nicotine No     THC No     CBD No     Flavoring No     Other No     Unknown No      Social History     Tobacco Use    Smoking status: Never    Smokeless tobacco: Never   Vaping Use    Vaping status: Never Used   Substance Use Topics    Alcohol use: Not Currently    Drug use: Not Currently       Review of Systems   Unable to perform ROS: Patient nonverbal       Physical Exam  Physical Exam  Vitals and nursing note reviewed.   Constitutional:       Comments: Not awake or alert at baseline.  No tremor activity.  Moving all extremities.   HENT:      Head: Normocephalic and atraumatic.      Right Ear: External ear normal.      Left Ear: External ear normal.      Nose: Nose normal.   Eyes:       Conjunctiva/sclera: Conjunctivae normal.   Cardiovascular:      Rate and Rhythm: Normal rate and regular rhythm.      Heart sounds: No murmur heard.  Pulmonary:      Effort: Pulmonary effort is normal. No respiratory distress.      Breath sounds: Normal breath sounds.   Abdominal:      Palpations: Abdomen is soft.      Tenderness: There is no abdominal tenderness. There is no guarding or rebound.      Comments: Abdomen soft nondistended PEG tube in place no evidence of dislodgment or leakage.   Musculoskeletal:         General: No swelling.      Cervical back: Neck supple.   Skin:     General: Skin is warm and dry.      Capillary Refill: Capillary refill takes less than 2 seconds.   Neurological:      Comments: Moves all extremities.  Localizes to painful stimuli.  No seizure activity or rigidity noted at this time.   Psychiatric:         Mood and Affect: Mood normal.         Vital Signs  ED Triage Vitals [08/06/24 1012]   Temperature Pulse Respirations Blood Pressure SpO2   97.9 °F (36.6 °C) 74 16 91/57 99 %      Temp Source Heart Rate Source Patient Position - Orthostatic VS BP Location FiO2 (%)   Temporal Monitor Lying Left arm --      Pain Score       --           Vitals:    08/06/24 1345 08/06/24 1400 08/06/24 1430 08/06/24 1500   BP: 94/57 107/71 (!) 87/55 94/61   Pulse: 72 69 75 72   Patient Position - Orthostatic VS: Lying Lying Lying Lying         Visual Acuity  Visual Acuity      Flowsheet Row Most Recent Value   L Pupil Size (mm) 3   R Pupil Size (mm) 3            ED Medications  Medications   sodium chloride 0.9 % bolus 1,000 mL (0 mL Intravenous Stopped 8/6/24 1145)   potassium chloride oral solution 40 mEq (40 mEq Per G Tube Given 8/6/24 1108)       Diagnostic Studies  Results Reviewed       Procedure Component Value Units Date/Time    Lacosamide [[id number]]  (Normal) Collected: 08/06/24 1026    Lab Status: Final result Specimen: Blood from Arm, Left Updated: 08/06/24 1615     Lacosamide 8.89  ug/mL     Narrative:      Coadministration of carbamazepine and phenytoin (inducers of drugmetabolizing  enzymes) may decrease serum concentrations of lacosamide substantially.  Lacosamide drug concentrations should not be the only means of therapeutic drug  management. The assay should be used in conjunction with information available from clinical  evaluations and other diagnostic procedures. Clinicians should carefully monitor patients  during therapy and dosage adjustments.      TSH, 3rd generation with Free T4 reflex [[id number]]  (Normal) Collected: 08/06/24 1026    Lab Status: Final result Specimen: Blood from Arm, Left Updated: 08/06/24 1104     TSH 3RD GENERATON 0.867 uIU/mL     Lactic acid, plasma (w/reflex if result > 2.0) [[id number]]  (Normal) Collected: 08/06/24 1026    Lab Status: Final result Specimen: Blood from Arm, Left Updated: 08/06/24 1049     LACTIC ACID 1.1 mmol/L     Narrative:      Result may be elevated if tourniquet was used during collection.    Comprehensive metabolic panel [[id number]]  (Abnormal) Collected: 08/06/24 1026    Lab Status: Final result Specimen: Blood from Arm, Left Updated: 08/06/24 1049     Sodium 140 mmol/L      Potassium 2.8 mmol/L      Chloride 110 mmol/L      CO2 22 mmol/L      ANION GAP 8 mmol/L      BUN 17 mg/dL      Creatinine 0.43 mg/dL      Glucose 139 mg/dL      Calcium 9.0 mg/dL      AST 19 U/L      ALT 28 U/L      Alkaline Phosphatase 79 U/L      Total Protein 6.5 g/dL      Albumin 3.9 g/dL      Total Bilirubin 0.29 mg/dL      eGFR 124 ml/min/1.73sq m     Narrative:      National Kid[employer] Foundation guidelines for Chronic Kidney Disease (CKD):     Stage 1 with normal or high GFR (GFR > 90 mL/min/1.73 square meters)    Stage 2 Mild CKD (GFR = 60-89 mL/min/1.73 square meters)    Stage 3A Moderate CKD (GFR = 45-59 mL/min/1.73 square meters)    Stage 3B Moderate CKD (GFR = 30-44 mL/min/1.73 square meters)    Stage 4 Severe CKD (GFR = 15-29 mL/min/1.73 square  meters)    Stage 5 End Stage CKD (GFR <15 mL/min/1.73 square meters)  Note: GFR calculation is accurate only with a steady state creatinine    Magnesium [[id number]]  (Normal) Collected: 08/06/24 1026    Lab Status: Final result Specimen: Blood from Arm, Left Updated: 08/06/24 1049     Magnesium 1.9 mg/dL     CK [[id number]]  (Normal) Collected: 08/06/24 1026    Lab Status: Final result Specimen: Blood from Arm, Left Updated: 08/06/24 1049     Total CK 40 U/L     CBC and differential [[id number]]  (Abnormal) Collected: 08/06/24 1026    Lab Status: Final result Specimen: Blood from Arm, Left Updated: 08/06/24 1034     WBC 7.33 Thousand/uL      RBC 3.63 Million/uL      Hemoglobin 11.8 g/dL      Hematocrit 35.8 %      MCV 99 fL      MCH 32.5 pg      MCHC 33.0 g/dL      RDW 13.1 %      MPV 11.3 fL      Platelets 204 Thousands/uL      nRBC 0 /100 WBCs      Segmented % 66 %      Immature Grans % 0 %      Lymphocytes % 23 %      Monocytes % 8 %      Eosinophils Relative 2 %      Basophils Relative 1 %      Absolute Neutrophils 4.89 Thousands/µL      Absolute Immature Grans 0.01 Thousand/uL      Absolute Lymphocytes 1.66 Thousands/µL      Absolute Monocytes 0.59 Thousand/µL      Eosinophils Absolute 0.12 Thousand/µL      Basophils Absolute 0.06 Thousands/µL     Topiramate level [[id number]] Collected: 08/06/24 1026    Lab Status: In process Specimen: Blood from Arm, Left Updated: 08/06/24 1031    Brivaracetam [[id number]] Collected: 08/06/24 1026    Lab Status: In process Specimen: Blood from Arm, Left Updated: 08/06/24 1031    RUFINAMIDE (BANZEL) Level [[id number]] Collected: 08/06/24 1026    Lab Status: In process Specimen: Blood from Arm, Left Updated: 08/06/24 1031                   CT head without contrast   Final Result by Chintan Meehan MD (08/06 1247)      No acute intracranial hemorrhage, mass effect or edema.                  Workstation performed: [id number]                    Procedures  Procedures          ED Course  ED Course as of 08/06/24 1637   Tue Aug 06, 2024   1021 EKG interpreted by myself.  EKG dated 8/6 2024-10-16 demonstrates normal sinus rhythm at 73 bpm, normal HI, QRS, QTc intervals, no STEMI.   1022 Blood Pressure: 91/57  This appears to be the patient's baseline blood pressure.  Her EKG is baseline.  She is not tachycardic.  Other screening vital signs are normal.  Will provide some IV fluids for possible dehydration which may explain a lowered seizure threshold.  Will send levels for her antiepileptic drugs.  Reviewing chart at this time.  Patient clinically stable.   1100 Potassium(!): 2.8  Will replete via PEG tube   1328 No acute findings on workup aside from hypoKalemia, s/p repletion. Patient's case d/w neruo EMU call for network Dr Guerra and network neurosurgery attending Dr Hinton, who agree with transfer as only campus that has equipment to interrogate is [facility] and given OP neurologist wanted inpatient eval/replacement they think it is reasonable to transfer to [facility] and NS eval.    1346 Conference with SLIM at [facility] via PACS via phone call, accepted to [facility] Dr Mendez med surg                                 SBIRT 20yo+      Flowsheet Row Most Recent Value   Initial Alcohol Screen: US AUDIT-C     3b. FEMALE Any Age, or MALE 65+: How often do you have 4 or more drinks on one occassion? 0 Filed at: 08/06/2024 1015   Audit-C Score 0 Filed at: 08/06/2024 1015   SREE: How many times in the past year have you...    Used an illegal drug or used a prescription medication for non-medical reasons? Never Filed at: 08/06/2024 1015                      Medical Decision Making  This is a 44-year-old female with history of intractable epilepsy on multiple antiepileptic drugs also with vagus nerve stimulator with a known low battery if she presents here today for multiple breakthrough seizures lasting seconds with at least 12 reported episodes.  She received a total of 10 mg of rectal Valium with  no seizures noted by EMS or during ED course.  There was concerns prior to these breakthrough seizures based on patient's EEG and clinical history and known interrogation of her vagus nerve stimulator, that battery was low and this was felt to reduce the seizure threshold by her neurologist.  She was referred to the ER and with suggestion that she should be evaluated by neurosurgery and undergo possible interrogation/replacement of vagus nerve stimulator battery.      Here in the emergency department, the patient has had no seizures and appears at her neurologic baseline.  There is no evidence of fever.  Her blood pressure is low but quite normal for her and she has no evidence of poor perfusion as evidenced by normal/baseline kidney function and normal lactic acid level.  Screening levels on her antiepileptic drugs were obtained.  The case was discussed with neurology EMU as well as neurosurgery who ultimately recommended the patient undergo transfer to [facility]", the only facility in network with the equipment to interrogate the battery and the only site available to potentially change/replace the battery by neurosurgery.  The patient was clinically stable in the ER and required no medications at the time of signout.    Problems Addressed:  Battery end of life of vagus nerve stimulator: acute illness or injury  Breakthrough seizure (HCC): acute illness or injury  Complication associated with vagal nerve stimulator: acute illness or injury  Hypokalemia: acute illness or injury    Amount and/or Complexity of Data Reviewed  Labs: ordered. Decision-making details documented in ED Course.  Radiology: ordered.    Risk  Prescription drug management.                 Disposition  Final diagnoses:   Breakthrough seizure (HCC)   Complication associated with vagal nerve stimulator   Battery end of life of vagus nerve stimulator   Hypokalemia     Time reflects when diagnosis was documented in both MDM as applicable and the  Disposition within this note       Time User Action Codes Description Comment    8/6/2024  1:21 PM Andi Sevilla [G40.919] Breakthrough seizure (HCC)     8/6/2024  1:22 PM Andi Sevilla [T85.9XXA] Complication associated with vagal nerve stimulator     8/6/2024  1:22 PM Andi Sevilla [Z45.42] Battery end of life of vagus nerve stimulator     8/6/2024  1:51 PM Andi Sevilla [E87.6] Hypokalemia           ED Disposition       ED Disposition   Transfer to Another Facility-In Network    Condition   --    Date/Time   Tue Aug 6, 2024 1321    Comment   Maira Bull should be transferred out to [facility].               Follow-up Information    None         Patient's Medications   Discharge Prescriptions    No medications on file       No discharge procedures on file.    PDMP Review         Value Time User    PDMP Reviewed  Yes 3/22/2024  4:04 PM Quynh España MD            ED Provider  Electronically Signed by             Andi Sevilla DO  08/06/24 6615

## 2024-08-06 NOTE — EMTALA/ACUTE CARE TRANSFER
[facility] EMERGENCY DEPARTMENT  [address] [address]  Dept: [phone]      EMTALA TRANSFER CONSENT    NAME Maira Bull                                         1980                              MRN [id number]    I have been informed of my rights regarding examination, treatment, and transfer   by Dr. Andi Sevilla DO    Benefits:  Treatment/interventions/equipment not available this facility including neurosurgery, EMU, and equipment required to interrogate vagal nerve stimulator battery.    Risks:  Decompensated illness during transport, delays in care due to transport times, injury during transport.      Consent for Transfer:  I acknowledge that my medical condition has been evaluated and explained to me by the emergency department physician or other qualified medical person and/or my attending physician, who has recommended that I be transferred to the service of    at  . The above potential benefits of such transfer, the potential risks associated with such transfer, and the probable risks of not being transferred have been explained to me, and I fully understand them.  The doctor has explained that, in my case, the benefits of transfer outweigh the risks.  I agree to be transferred.    I authorize the performance of emergency medical procedures and treatments upon me in both transit and upon arrival at the receiving facility.  Additionally, I authorize the release of any and all medical records to the receiving facility and request they be transported with me, if possible.  I understand that the safest mode of transportation during a medical emergency is an ambulance and that the Hospital advocates the use of this mode of transport. Risks of traveling to the receiving facility by car, including absence of medical control, life sustaining equipment, such as oxygen, and medical personnel has been explained to me and I fully understand them.    (THOMAS ZURITA  BOX BELOW)  [ x ]  I consent to the stated transfer and to be transported by ambulance/helicopter.  [  ]  I consent to the stated transfer, but refuse transportation by ambulance and accept full responsibility for my transportation by car.  I understand the risks of non-ambulance transfers and I exonerate the Hospital and its staff from any deterioration in my condition that results from this refusal.    X___________________________________________    DATE  24  TIME________  Signature of patient or legally responsible individual signing on patient behalf           RELATIONSHIP TO PATIENT_________________________          Provider Certification    NAME Maira Bull                                         1980                              MRN [id number]    A medical screening exam was performed on the above named patient.  Based on the examination:    Condition Necessitating Transfer The primary encounter diagnosis was Breakthrough seizure (HCC). Diagnoses of Complication associated with vagal nerve stimulator, Battery end of life of vagus nerve stimulator, and Hypokalemia were also pertinent to this visit.    Patient Condition:  Stable    Reason for Transfer:  breakthrough seizure, low vagus nerve stimulator battery, needs interrogation/replacement by neurosurgery not available at this facility.    Transfer Requirements: Facility   [facility]  Space available and qualified personnel available for treatment as acknowledged by  PACS  Agreed to accept transfer and to provide appropriate medical treatment as acknowledged by FERNANDO Mendez          Appropriate medical records of the examination and treatment of the patient are provided at the time of transfer   STAFF INITIAL WHEN COMPLETED _______  Transfer will be performed by qualified personnel from    and appropriate transfer equipment as required, including the use of necessary and appropriate life support measures.    Provider Certification: I have examined  the patient and explained the following risks and benefits of being transferred/refusing transfer to the patient/family:         Based on these reasonable risks and benefits to the patient and/or the unborn child(aki), and based upon the information available at the time of the patient’s examination, I certify that the medical benefits reasonably to be expected from the provision of appropriate medical treatments at another medical facility outweigh the increasing risks, if any, to the individual’s medical condition, and in the case of labor to the unborn child, from effecting the transfer.    X____________________________________________ DATE 08/06/24        TIME_______      ORIGINAL - SEND TO MEDICAL RECORDS   COPY - SEND WITH PATIENT DURING TRANSFER

## 2024-08-06 NOTE — TELEPHONE ENCOUNTER
"Lachelle a nurse at [facility] calling this am as she walked into patient seizing at 840 am, Nurse was able to give patient all Am meds this am for her seizures including Valium. Patient had 12 seizures thus far, each lasting 5-8 secs with jerking, and stiffness and breathing fast (which all three symptoms are new) Patient had last seizure  last Thursday or Friday, unaware of the symptoms  per report, but informed pt never had jerking, stiffness or breathing fast ever in past as that would have been report. Nurse informed they have to call office to notify what the next step is since she is having multiple seizures, which have been going on for over 10- 15  mins. Nurse report since giving valium, the last seizure she witness prior to calling office lasted 4 secs, but still she is having seizures.    I instructed Lachelle per symptoms, she needs to call 911 to get patient to a hospital, Lachelle did not inform which hospital patient would be going to. She is aware I will send our provider this information. Lachelle had no further questions.     Reason for Disposition   Epileptic seizure (in adult with known epilepsy) and continues > 5 minutes    Answer Assessment - Initial Assessment Questions  1. ONSET: \"How long did the seizure last?\" (Minutes)       Today started at 840 am lasting 5-8 secs last 20 mins, they are getting shorter since given valium, seizure lasting 4 secs now. Had approx 12 seizure since 840 am, was not given in report that she has been having seizures from prev shift.   2. CONTENT: \"Describe what happened during the seizure. Did the body become stiff? Was there any jerking?\"       Patient had jerking , stiff, pt is breathing fast during, but then she stops  3. CIRCUMSTANCE: \"What was the individual doing when the seizure began?\"       Nurse seen pt in bed and walking into patient having a seizure, unaware of what pt was doing prior.  4. MENTAL STATUS: \"Does he know who he is, who you are, and where " "he is?\"       Patient is not cognitively aware of anything, except she will look at nurse and acknowledge when giving water with her meds. Today during and after the seizure pt was not acknowledging nurse as she usually does  5. PRIOR SEIZURES: \"Has the individual had a seizure (convulsion) before?\" If Yes, ask: \"When was the last time?\" and \"What happened last time?\"      Nurse stated last seizure was around Thursday or Friday last week, as she seen in prev report, pt did not display jerking, or stiff and juan diego not the breathing she was doing today. The breathing is def new and nurse is concerned.  6. EPILEPSY: \"Does the individual have epilepsy?\" (note: check for medical ID bracelet)      Patient does have hx of epilepsy and wears a bracelet, she has VNS has in her chest and nurse thinks battery needs to be changed, as the magnet is not effective any longer.   7. MEDICATIONS: \"Does the individual take anticonvulsant medications?\" (e.g., yes/no, compliance, any recent changes)      Yes, she had all her am meds, including Valium ( I reviewed all meds with nurse which matches the med list in chart)  8. INJURY: \"Did the individual hurt himself during the seizure?\" (e.g., head, tongue)      Denies any injury , head trauma or tongue biting  9. OTHER SYMPTOMS: \"Are there any other symptoms?\" (e.g., fever, headache)      Patient has her menses currently, Only the breathing fast, with jerking and stiff is new for her multiple seizures today.    Protocols used: Seizure-ADULT-[place]    "

## 2024-08-06 NOTE — TELEPHONE ENCOUNTER
Joy from [facility] called to ask if appt had been made for the battery replacement. I let her know I would get the message to the MA to assist.

## 2024-08-06 NOTE — TELEPHONE ENCOUNTER
I would agree that she needs to go to the emergency department for urgent evaluation for underlying triggers such as infection, low medication levels, and neurosurgery consultation for an urgent VNS replacement when VNS battery is less than 15% less current is being given leading to subtherapeutic dosing of VNS.

## 2024-08-07 LAB
ANION GAP SERPL CALCULATED.3IONS-SCNC: 7 MMOL/L (ref 4–13)
BUN SERPL-MCNC: 11 MG/DL (ref 5–25)
CALCIUM SERPL-MCNC: 8.6 MG/DL (ref 8.4–10.2)
CHLORIDE SERPL-SCNC: 111 MMOL/L (ref 96–108)
CO2 SERPL-SCNC: 20 MMOL/L (ref 21–32)
CREAT SERPL-MCNC: 0.42 MG/DL (ref 0.6–1.3)
GFR SERPL CREATININE-BSD FRML MDRD: 125 ML/MIN/1.73SQ M
GLUCOSE SERPL-MCNC: 99 MG/DL (ref 65–140)
POTASSIUM SERPL-SCNC: 3.7 MMOL/L (ref 3.5–5.3)
SODIUM SERPL-SCNC: 138 MMOL/L (ref 135–147)

## 2024-08-07 PROCEDURE — 99285 EMERGENCY DEPT VISIT HI MDM: CPT | Performed by: EMERGENCY MEDICINE

## 2024-08-07 PROCEDURE — C9254 INJECTION, LACOSAMIDE: HCPCS | Performed by: EMERGENCY MEDICINE

## 2024-08-07 PROCEDURE — 96361 HYDRATE IV INFUSION ADD-ON: CPT

## 2024-08-07 PROCEDURE — 96376 TX/PRO/DX INJ SAME DRUG ADON: CPT

## 2024-08-07 PROCEDURE — 80048 BASIC METABOLIC PNL TOTAL CA: CPT | Performed by: EMERGENCY MEDICINE

## 2024-08-07 PROCEDURE — 36415 COLL VENOUS BLD VENIPUNCTURE: CPT | Performed by: EMERGENCY MEDICINE

## 2024-08-07 RX ORDER — SODIUM CHLORIDE 9 MG/ML
100 INJECTION, SOLUTION INTRAVENOUS CONTINUOUS
Status: DISCONTINUED | OUTPATIENT
Start: 2024-08-07 | End: 2024-08-08

## 2024-08-07 RX ORDER — LACOSAMIDE 10 MG/ML
200 INJECTION, SOLUTION INTRAVENOUS EVERY 12 HOURS
Status: DISCONTINUED | OUTPATIENT
Start: 2024-08-07 | End: 2024-08-08 | Stop reason: SDUPTHER

## 2024-08-07 RX ADMIN — SODIUM CHLORIDE 100 ML/HR: 0.9 INJECTION, SOLUTION INTRAVENOUS at 02:59

## 2024-08-07 RX ADMIN — TOPIRAMATE 250 MG: 100 TABLET, FILM COATED ORAL at 08:58

## 2024-08-07 RX ADMIN — BRIVARACETAM 50 MG: 10 SOLUTION ORAL at 21:55

## 2024-08-07 RX ADMIN — RUFINAMIDE 1600 MG: 40 SUSPENSION ORAL at 21:56

## 2024-08-07 RX ADMIN — SODIUM CHLORIDE 100 ML/HR: 0.9 INJECTION, SOLUTION INTRAVENOUS at 13:00

## 2024-08-07 RX ADMIN — LACOSAMIDE 200 MG: 10 INJECTION INTRAVENOUS at 22:01

## 2024-08-07 RX ADMIN — TOPIRAMATE 250 MG: 100 TABLET, FILM COATED ORAL at 21:49

## 2024-08-07 RX ADMIN — BRIVARACETAM 50 MG: 10 SOLUTION ORAL at 08:58

## 2024-08-07 RX ADMIN — RUFINAMIDE 1600 MG: 40 SUSPENSION ORAL at 08:59

## 2024-08-07 NOTE — ED RE-EVALUATION NOTE
Accepted in signouts from Dr. Turner at 1720; 44-year-old female with history of Lennox Guest start syndrome cerebral anoxic injury intractable epilepsy and intellectual disability presented from the nursing home with worsening seizures.  Concerns are that this is due to end of battery life for her vagal nerve stimulator she is currently awaiting transfer to [place] to get the battery changed.  Patient has had no seizures here in the emergency department medicine is following along to manage her tube feeds.  Her antiepileptic and other medications have been restarted.  And would be to use diazepam for any breakthrough seizures.  Potassium was supplemented yesterday IV with 40 mill equivalents will recheck BMP which is currently pending.    BMP reviewed bicarb 20 potassium normal no events on evening shift no pickup time scheduled no recurrent seizures    Dr. Alejandro assumes care at 1227     Nataly Mcclain MD  08/08/24 0027

## 2024-08-07 NOTE — ED PROVIDER NOTES
History  Chief Complaint   Patient presents with    Seizure - Prior Hx Of     Per nursing home pt had 10 to 15 seizures today gave 2 doses of 5mg rectal valium to stop seizures and called neurology who wanted pt evaluated       Seizure - Prior Hx Of      Prior to Admission Medications   Prescriptions Last Dose Informant Patient Reported? Taking?   Probiotic Product (ALEXANDER-BID PROBIOTIC PO)  Outside Facility (Specify) Yes No   Si tablet by Per G Tube route daily     Patient not taking: Reported on 2024   VITAMIN D PO  Outside Facility (Specify) Yes No   Sig: Take 1,000 mg by mouth in the morning Given in her G-tube   acetaminophen (TYLENOL) 325 mg tablet  Outside Facility (Specify) Yes No   Sig: Take 650 mg by mouth every 4 (four) hours as needed for mild pain or fever   bisacodyl (DULCOLAX) 10 mg suppository   Yes No   Sig: Insert 10 mg into the rectum daily   brivaracetam (BRIVIACT) 50 MG TABS tablet   No No   Sig: Give 1 tablet via PEG tube q12 hours   cannabidiol (Epidiolex) 100 mg/ml SOLN   No No   Si mL (500 mg total) by Per G Tube route 2 (two) times a day   cloBAZam (ONFI) 10 MG tablet   No No   Sig: Give PEG-tube half a tab in the morning and one tab at bedtime   diazepam (VALIUM) 5 MG/ML solution   No No   Sig: If the patient has a seizure lasting more than 3 minutes then give 1mL by PEG tube, may repeat 1mL if she continues to have seizure for more than 10 minutes.   lacosamide (VIMPAT) 200 mg tablet   No No   Sig: Give 1 tablet via PEG tube q12 hours   Patient not taking: Reported on 2024   rufinamide (BANZEL) 400 mg tablet   No No   Si tablets (1,600 mg total) by Per G Tube route every 12 (twelve) hours   topiramate (TOPAMAX) 50 MG tablet   No No   Si tablets (250 mg total) by Per G Tube route every 12 (twelve) hours      Facility-Administered Medications: None       Past Medical History:   Diagnosis Date    ADHD     Anoxic brain damage (HCC)     Autistic disorder      Breakthrough seizure (HCC) 8/1/2019    Dysphagia     Dysphagia, oropharyngeal phase     Gastrostomy tube dependent (HCC)     14 Fr as of 05/19/2020    Hyperammonemia (HCC)     Hyperkeratosis     Hypotension     Intellectual disability     Lennox-Gastaut syndrome with tonic seizures (HCC)     Lethargy     Liver enzyme elevation     Onychomycosis     Osteoporosis     Osteoporosis        Past Surgical History:   Procedure Laterality Date    ABDOMINAL SURGERY      CARDIAC PACEMAKER PLACEMENT      vns implant l chest    IR GASTROJEJUNOSTOMY (GJ) TUBE PLACEMENT  12/8/2023    IR GASTROSTOMY (G) TUBE CHECK/CHANGE/REINSERTION/UPSIZE  6/21/2024    IR GASTROSTOMY TUBE PLACEMENT  11/21/2019    IR THORACENTESIS  12/17/2018    JEJUNOSTOMY FEEDING TUBE      history of - most recently, PEG tube    PEG TUBE PLACEMENT      [facility] INSJ/RPLCMT CRANIAL NEUROSTIM PULSE GENERATOR Left 12/18/2019    Procedure: REPLACEMENT IMPLANTABLE PULSE GENERATOR FOR VAGAL NERVE STIMULATOR, LEFT CHEST;  Surgeon: Patrick Canales MD;  Location: [facility];  Service: Neurosurgery       History reviewed. No pertinent family history.  I have reviewed and agree with the history as documented.    E-Cigarette/Vaping    E-Cigarette Use Never User      E-Cigarette/Vaping Substances    Nicotine No     THC No     CBD No     Flavoring No     Other No     Unknown No      Social History     Tobacco Use    Smoking status: Never    Smokeless tobacco: Never   Vaping Use    Vaping status: Never Used   Substance Use Topics    Alcohol use: Not Currently    Drug use: Not Currently       Review of Systems    Physical Exam  Physical Exam    Vital Signs  ED Triage Vitals [08/06/24 1012]   Temperature Pulse Respirations Blood Pressure SpO2   97.9 °F (36.6 °C) 74 16 91/57 99 %      Temp Source Heart Rate Source Patient Position - Orthostatic VS BP Location FiO2 (%)   Temporal Monitor Lying Left arm --      Pain Score       --           Vitals:    08/06/24 2200 08/07/24 0300 08/07/24  0600 08/07/24 1300   BP: 102/60 (!) 87/56 95/60 110/58   Pulse: 79 75 75 72   Patient Position - Orthostatic VS:   Lying Lying         Visual Acuity  Visual Acuity      Flowsheet Row Most Recent Value   L Pupil Size (mm) 3   R Pupil Size (mm) 3            ED Medications  Medications   Rufinamide SUSP 1,600 mg (1,600 mg Per PEG Tube Given 8/7/24 0859)   lacosamide (VIMPAT) tablet 200 mg (200 mg Per PEG Tube Not Given 8/7/24 0950)   Brivaracetam (BRIVIACT) oral solution 50 mg (50 mg Per PEG Tube Given 8/7/24 0858)   topiramate (TOPAMAX) tablet 250 mg (250 mg Per PEG Tube Given 8/7/24 0858)   sodium chloride 0.9 % infusion (100 mL/hr Intravenous New Bag 8/7/24 0259)   topiramate (TOPAMAX) 25 mg tablet **ADS Override Pull** (has no administration in time range)   topiramate (TOPAMAX) 100 mg tablet **ADS Override Pull** (has no administration in time range)   sodium chloride 0.9 % bolus 1,000 mL (0 mL Intravenous Stopped 8/6/24 1145)   potassium chloride oral solution 40 mEq (40 mEq Per G Tube Given 8/6/24 1108)       Diagnostic Studies  Results Reviewed       Procedure Component Value Units Date/Time    Basic metabolic panel [[id number]]     Lab Status: No result Specimen: Blood     Lacosamide [[id number]]  (Normal) Collected: 08/06/24 1026    Lab Status: Final result Specimen: Blood from Arm, Left Updated: 08/06/24 1615     Lacosamide 8.89 ug/mL     Narrative:      Coadministration of carbamazepine and phenytoin (inducers of drugmetabolizing  enzymes) may decrease serum concentrations of lacosamide substantially.  Lacosamide drug concentrations should not be the only means of therapeutic drug  management. The assay should be used in conjunction with information available from clinical  evaluations and other diagnostic procedures. Clinicians should carefully monitor patients  during therapy and dosage adjustments.      TSH, 3rd generation with Free T4 reflex [[id number]]  (Normal) Collected: 08/06/24 1026    Lab Status:  Final result Specimen: Blood from Arm, Left Updated: 08/06/24 1104     TSH 3RD GENERATON 0.867 uIU/mL     Lactic acid, plasma (w/reflex if result > 2.0) [[id number]]  (Normal) Collected: 08/06/24 1026    Lab Status: Final result Specimen: Blood from Arm, Left Updated: 08/06/24 1049     LACTIC ACID 1.1 mmol/L     Narrative:      Result may be elevated if tourniquet was used during collection.    Comprehensive metabolic panel [[id number]]  (Abnormal) Collected: 08/06/24 1026    Lab Status: Final result Specimen: Blood from Arm, Left Updated: 08/06/24 1049     Sodium 140 mmol/L      Potassium 2.8 mmol/L      Chloride 110 mmol/L      CO2 22 mmol/L      ANION GAP 8 mmol/L      BUN 17 mg/dL      Creatinine 0.43 mg/dL      Glucose 139 mg/dL      Calcium 9.0 mg/dL      AST 19 U/L      ALT 28 U/L      Alkaline Phosphatase 79 U/L      Total Protein 6.5 g/dL      Albumin 3.9 g/dL      Total Bilirubin 0.29 mg/dL      eGFR 124 ml/min/1.73sq m     Narrative:      National Kidney Disease Foundation guidelines for Chronic Kidney Disease (CKD):     Stage 1 with normal or high GFR (GFR > 90 mL/min/1.73 square meters)    Stage 2 Mild CKD (GFR = 60-89 mL/min/1.73 square meters)    Stage 3A Moderate CKD (GFR = 45-59 mL/min/1.73 square meters)    Stage 3B Moderate CKD (GFR = 30-44 mL/min/1.73 square meters)    Stage 4 Severe CKD (GFR = 15-29 mL/min/1.73 square meters)    Stage 5 End Stage CKD (GFR <15 mL/min/1.73 square meters)  Note: GFR calculation is accurate only with a steady state creatinine    Magnesium [[id number]]  (Normal) Collected: 08/06/24 1026    Lab Status: Final result Specimen: Blood from Arm, Left Updated: 08/06/24 1049     Magnesium 1.9 mg/dL     CK [[id number]]  (Normal) Collected: 08/06/24 1026    Lab Status: Final result Specimen: Blood from Arm, Left Updated: 08/06/24 1049     Total CK 40 U/L     CBC and differential [[id number]]  (Abnormal) Collected: 08/06/24 1026    Lab Status: Final result Specimen: Blood from Arm,  Left Updated: 08/06/24 1034     WBC 7.33 Thousand/uL      RBC 3.63 Million/uL      Hemoglobin 11.8 g/dL      Hematocrit 35.8 %      MCV 99 fL      MCH 32.5 pg      MCHC 33.0 g/dL      RDW 13.1 %      MPV 11.3 fL      Platelets 204 Thousands/uL      nRBC 0 /100 WBCs      Segmented % 66 %      Immature Grans % 0 %      Lymphocytes % 23 %      Monocytes % 8 %      Eosinophils Relative 2 %      Basophils Relative 1 %      Absolute Neutrophils 4.89 Thousands/µL      Absolute Immature Grans 0.01 Thousand/uL      Absolute Lymphocytes 1.66 Thousands/µL      Absolute Monocytes 0.59 Thousand/µL      Eosinophils Absolute 0.12 Thousand/µL      Basophils Absolute 0.06 Thousands/µL     Topiramate level [[id number]] Collected: 08/06/24 1026    Lab Status: In process Specimen: Blood from Arm, Left Updated: 08/06/24 1031    Brivaracetam [[id number]] Collected: 08/06/24 1026    Lab Status: In process Specimen: Blood from Arm, Left Updated: 08/06/24 1031    RUFINAMIDE (BANZEL) Level [[id number]] Collected: 08/06/24 1026    Lab Status: In process Specimen: Blood from Arm, Left Updated: 08/06/24 1031                   CT head without contrast   Final Result by Chintan Meehan MD (08/06 1247)      No acute intracranial hemorrhage, mass effect or edema.                  Workstation performed: [id number]                    Procedures  Procedures         ED Course  ED Course as of 08/07/24 1719   Wed Aug 07, 2024   0704 44 F hx seizure disorder on multiple AED, prior anoxic brain injury  Vagal nerve stimulator--apparently battery charge low  Multiple seizures 6 Aug at home: received diazepam at home  D/w neuro who felt transfer to [facility] for interrogation of device would make sense  Accepted [facility]; no transfer time available yet   0706 CBC and differential(!)   0706 Magnesium   0706 Lactic acid, plasma (w/reflex if result > 2.0)   0706 Lacosamide   0706 TSH, 3rd generation with Free T4 reflex   0706 CK   0706 Comprehensive metabolic  panel(!)   1719 No issues during my shift, but unfortunately no pickup time available.  Patient remained hemodynamically stable throughout the course of the day with no seizure-like activity at any point.  Consultation made with internal medicine with tube feeding to be started.  Otherwise patient receiving normal daily meds for epilepsy. Signed over the case to Dr. Mcclain.         SBIRT 22yo+      Flowsheet Row Most Recent Value   Initial Alcohol Screen: US AUDIT-C     3b. FEMALE Any Age, or MALE 65+: How often do you have 4 or more drinks on one occassion? 0 Filed at: 08/06/2024 1015   Audit-C Score 0 Filed at: 08/06/2024 1015   SREE: How many times in the past year have you...    Used an illegal drug or used a prescription medication for non-medical reasons? Never Filed at: 08/06/2024 1015                      Medical Decision Making  Amount and/or Complexity of Data Reviewed  Labs: ordered. Decision-making details documented in ED Course.  Radiology: ordered.    Risk  Prescription drug management.                 Disposition  Final diagnoses:   Breakthrough seizure (HCC)   Complication associated with vagal nerve stimulator   Battery end of life of vagus nerve stimulator   Hypokalemia     Time reflects when diagnosis was documented in both MDM as applicable and the Disposition within this note       Time User Action Codes Description Comment    8/6/2024  1:21 PM Andi Sevlila Add [G40.919] Breakthrough seizure (HCC)     8/6/2024  1:22 PM Andi Sevilla [T85.9XXA] Complication associated with vagal nerve stimulator     8/6/2024  1:22 PM Andi Sevilla [Z45.42] Battery end of life of vagus nerve stimulator     8/6/2024  1:51 PM Andi Sevilla [E87.6] Hypokalemia     8/7/2024 11:48 AM Cosmo Gonzalez Modify [G40.919] Breakthrough seizure (HCC)     8/7/2024 11:48 AM Cosmo Gonzalez Add [E44.0] Moderate protein-calorie malnutrition (HCC)           ED Disposition       ED Disposition    Transfer to Another Facility-In Network    Condition   --    Date/Time   Tue Aug 6, 2024 1321    Comment   Maira Bull should be transferred out to [facility].               Follow-up Information    None         Patient's Medications   Discharge Prescriptions    No medications on file       No discharge procedures on file.    PDMP Review         Value Time User    PDMP Reviewed  Yes 3/22/2024  4:04 PM Quynh España MD            ED Provider  Electronically Signed by             Anton Turner DO  08/08/24 0856

## 2024-08-08 ENCOUNTER — HOSPITAL ENCOUNTER (INPATIENT)
Facility: HOSPITAL | Age: 44
LOS: 5 days | Discharge: NURSING, REHAB OR LONG-TERM CARE | DRG: 041 | End: 2024-08-13
Attending: FAMILY MEDICINE | Admitting: FAMILY MEDICINE
Payer: MEDICARE

## 2024-08-08 VITALS
WEIGHT: 102.51 LBS | SYSTOLIC BLOOD PRESSURE: 103 MMHG | HEART RATE: 74 BPM | OXYGEN SATURATION: 98 % | DIASTOLIC BLOOD PRESSURE: 70 MMHG | RESPIRATION RATE: 20 BRPM | BODY MASS INDEX: 20.02 KG/M2 | TEMPERATURE: 97.9 F

## 2024-08-08 DIAGNOSIS — F79 INTELLECTUAL DISABILITY WITH EPILEPSY (HCC): ICD-10-CM

## 2024-08-08 DIAGNOSIS — Z45.42 BATTERY END OF LIFE OF VAGUS NERVE STIMULATOR: ICD-10-CM

## 2024-08-08 DIAGNOSIS — G40.812 NONINTRACTABLE LENNOX-GASTAUT SYNDROME WITHOUT STATUS EPILEPTICUS (HCC): Primary | ICD-10-CM

## 2024-08-08 DIAGNOSIS — G40.909 INTELLECTUAL DISABILITY WITH EPILEPSY (HCC): ICD-10-CM

## 2024-08-08 DIAGNOSIS — G40.813 INTRACTABLE LENNOX-GASTAUT SYNDROME WITH STATUS EPILEPTICUS (HCC): ICD-10-CM

## 2024-08-08 DIAGNOSIS — Z96.89 S/P PLACEMENT OF VNS (VAGUS NERVE STIMULATION) DEVICE: ICD-10-CM

## 2024-08-08 LAB
ATRIAL RATE: 73 BPM
P AXIS: 71 DEGREES
PR INTERVAL: 158 MS
QRS AXIS: 87 DEGREES
QRSD INTERVAL: 76 MS
QT INTERVAL: 384 MS
QTC INTERVAL: 423 MS
T WAVE AXIS: 71 DEGREES
TOPIRAMATE SERPL-MCNC: 14.4 UG/ML (ref 2–25)
VENTRICULAR RATE: 73 BPM

## 2024-08-08 PROCEDURE — 99223 1ST HOSP IP/OBS HIGH 75: CPT | Performed by: FAMILY MEDICINE

## 2024-08-08 PROCEDURE — 96361 HYDRATE IV INFUSION ADD-ON: CPT

## 2024-08-08 PROCEDURE — 93010 ELECTROCARDIOGRAM REPORT: CPT | Performed by: INTERNAL MEDICINE

## 2024-08-08 PROCEDURE — C9254 INJECTION, LACOSAMIDE: HCPCS | Performed by: STUDENT IN AN ORGANIZED HEALTH CARE EDUCATION/TRAINING PROGRAM

## 2024-08-08 PROCEDURE — 96365 THER/PROPH/DIAG IV INF INIT: CPT

## 2024-08-08 RX ORDER — LACOSAMIDE 10 MG/ML
200 INJECTION, SOLUTION INTRAVENOUS EVERY 12 HOURS
Status: DISCONTINUED | OUTPATIENT
Start: 2024-08-08 | End: 2024-08-08

## 2024-08-08 RX ORDER — LORAZEPAM 2 MG/ML
1 INJECTION INTRAMUSCULAR ONCE AS NEEDED
Status: DISCONTINUED | OUTPATIENT
Start: 2024-08-08 | End: 2024-08-13 | Stop reason: HOSPADM

## 2024-08-08 RX ORDER — LACOSAMIDE 10 MG/ML
200 SOLUTION ORAL EVERY 12 HOURS SCHEDULED
Status: DISCONTINUED | OUTPATIENT
Start: 2024-08-08 | End: 2024-08-13 | Stop reason: HOSPADM

## 2024-08-08 RX ORDER — CLOBAZAM 2.5 MG/ML
10 SUSPENSION ORAL 2 TIMES DAILY
Status: DISCONTINUED | OUTPATIENT
Start: 2024-08-08 | End: 2024-08-13 | Stop reason: HOSPADM

## 2024-08-08 RX ORDER — BISACODYL 10 MG
10 SUPPOSITORY, RECTAL RECTAL DAILY
Status: DISCONTINUED | OUTPATIENT
Start: 2024-08-09 | End: 2024-08-13 | Stop reason: HOSPADM

## 2024-08-08 RX ORDER — NYSTATIN 100000 [USP'U]/G
POWDER TOPICAL CONTINUOUS PRN
Status: DISCONTINUED | OUTPATIENT
Start: 2024-08-08 | End: 2024-08-08 | Stop reason: HOSPADM

## 2024-08-08 RX ORDER — CHOLECALCIFEROL (VITAMIN D3) 10(400)/ML
400 DROPS ORAL DAILY
Status: DISCONTINUED | OUTPATIENT
Start: 2024-08-08 | End: 2024-08-13 | Stop reason: HOSPADM

## 2024-08-08 RX ORDER — ACETAMINOPHEN 160 MG/5ML
650 SUSPENSION ORAL EVERY 4 HOURS PRN
Status: DISCONTINUED | OUTPATIENT
Start: 2024-08-08 | End: 2024-08-13 | Stop reason: HOSPADM

## 2024-08-08 RX ADMIN — NYSTATIN: 100000 POWDER TOPICAL at 06:07

## 2024-08-08 RX ADMIN — Medication 500 MG: at 21:40

## 2024-08-08 RX ADMIN — Medication 400 UNITS: at 22:56

## 2024-08-08 RX ADMIN — BRIVARACETAM 50 MG: 10 SOLUTION ORAL at 08:58

## 2024-08-08 RX ADMIN — LACOSAMIDE 200 MG: 10 SOLUTION ORAL at 21:39

## 2024-08-08 RX ADMIN — RUFINAMIDE 1600 MG: 200 TABLET, FILM COATED ORAL at 21:36

## 2024-08-08 RX ADMIN — CLOBAZAM 10 MG: 2.5 SUSPENSION ORAL at 21:40

## 2024-08-08 RX ADMIN — TOPIRAMATE 250 MG: 100 TABLET, FILM COATED ORAL at 21:35

## 2024-08-08 RX ADMIN — TOPIRAMATE 250 MG: 100 TABLET, FILM COATED ORAL at 08:59

## 2024-08-08 RX ADMIN — BRIVARACETAM 50 MG: 10 SOLUTION ORAL at 21:40

## 2024-08-08 RX ADMIN — RUFINAMIDE 1600 MG: 40 SUSPENSION ORAL at 08:58

## 2024-08-08 RX ADMIN — LACOSAMIDE 200 MG: 10 INJECTION INTRAVENOUS at 09:57

## 2024-08-08 NOTE — ED NOTES
Patient incontinent of bowel and bladder. Complete bed change done. Skin cleaned, new brief applied, zinc oxide cream placed. Mepilex sacrum border placed onto bilateral hips. Jevity feedings paused for medications. 20ml of residual. +Placement verified with auscultation and residual. Patient raised to Semi fowlers position to prevent aspiration      Khadar Loja RN  08/07/24 2207       Khadar Loja RN  08/07/24 5675

## 2024-08-08 NOTE — ASSESSMENT & PLAN NOTE
Patient with Lennox Gastuat syndrome and followed by neurology as outpatient  Patient has VNS.  Was recently seen in neurosurgery office for VNS battery exchange but unable to obtain consent.  Patient was sent to the emergency room with the persistent seizure from the facility.  Transferred to [facility] for evaluation by neurosurgery and for VNS battery exchange  Continue with antiepileptics as before admission-patient is followed by Dr. España from epileptologist  Consult neurosurgery for VNS.  Cording to ED note from my nurse no further seizure activity while in the hospital  Neurology evaluation

## 2024-08-08 NOTE — ASSESSMENT & PLAN NOTE
History of cerebral anoxic injury /cerebral pulse he  Patient with significant debility, nursing home resident  With resultant intractable seizure  Continue with supportive care

## 2024-08-08 NOTE — ED RE-EVALUATION NOTE
Accepted in signout from Dr. Doherty at 1638.  44-year-old female with history of intractable epilepsy intellectual disability anoxic brain injury presented from the nursing home with worsening seizures.  Since arrival to the emergency department she has had no further seizures she has a vagal nerve stimulator which is at end of battery life and is awaiting transfer to [facility] for battery change.  Internal medicine is following the patient for her tube feedings.  Potassium was supplemented and corrected.  Patient's pickup time is scheduled for now.  No further seizures today according to Dr. Doherty.     Nataly Mcclain MD  08/08/24 7552

## 2024-08-08 NOTE — ASSESSMENT & PLAN NOTE
Status post vein placement by neurosurgery.  During the recent neurology progress note VNS battery is at his end-of-life.  At time to have battery exchange done as outpatient.  Unable to obtain consent.  Patient with intractable seizure in the facility and sent to the emergency room  Will consult neurosurgery for VNS battery exchange

## 2024-08-08 NOTE — ED NOTES
Patient incontinent of urine. Patient cleaned, zinc oxide powder placed, Patient rotated. Nystatin powder placed     Khadar oLja RN  08/08/24 4212

## 2024-08-08 NOTE — H&P
[facility]  H&P  Name: Maira Bull 44 y.o. female I MRN: [id number]  Unit/Bed#: PPHP 607-01 I Date of Admission: (Not on file)   Date of Service: 8/8/2024 I Hospital Day: 0      Assessment & Plan   * Intractable epilepsy with status epilepticus (HCC)  Assessment & Plan  Patient with Lennox Gastuat syndrome and followed by neurology as outpatient  Patient has VNS.  Was recently seen in neurosurgery office for VNS battery exchange but unable to obtain consent.  Patient was sent to the emergency room with the persistent seizure from the facility.  Transferred to [facility] for evaluation by neurosurgery and for VNS battery exchange  Continue with antiepileptics as before admission-patient is followed by Dr. Espaañ from epileptologist  Consult neurosurgery for VNS.  Cording to ED note from my nurse no further seizure activity while in the hospital  Neurology evaluation    Intellectual disability with epilepsy (HCC)  Assessment & Plan  Currently a resident of a skilled nursing facility.  Nonverbal at baseline    S/P placement of VNS (vagus nerve stimulation) device  Assessment & Plan  Status post vein placement by neurosurgery.  During the recent neurology progress note VNS battery is at his end-of-life.  At time to have battery exchange done as outpatient.  Unable to obtain consent.  Patient with intractable seizure in the facility and sent to the emergency room  Will consult neurosurgery for VNS battery exchange    Cerebral anoxic injury (HCC)  Assessment & Plan  History of cerebral anoxic injury /cerebral pulse he  Patient with significant debility, nursing home resident  With resultant intractable seizure  Continue with supportive care       VTE Pharmacologic Prophylaxis:     Code Status: Prior   Discussion with family:     Anticipated Length of Stay: Patient will be admitted on an inpatient basis with an anticipated length of stay of greater than 2 midnights secondary to  vns battery exchange .    Total Time Spent on Date of Encounter in care of patient: 65 mins. This time was spent on one or more of the following: performing physical exam; counseling and coordination of care; obtaining or reviewing history; documenting in the medical record; reviewing/ordering tests, medications or procedures; communicating with other healthcare professionals and discussing with patient's family/caregivers.    Chief Complaint: Intractable seizures    History of Present Illness:  Maira Bull is a 44 y.o. female with a PMH of intractable seizure Lennox Gestaut syndrome, anoxic brain injury with significant intellectual disability who currently lives in a nursing home.  Patient was sent to [facility] from the nursing home due to intractable seizures.  Patient is on multiple AEDs.  Patient also has VNS during the last neurology progress note it appears that VNS has reached end of  its  battery life and needed battery exchange.  Patient was seen by neurosurgery and attempted to do the procedure as outpatient.  Unfortunately not able to obtain consent.  Due to recurrent seizure patient was sent to the hospital.  Patient will be transferred to [facility] for VNS battery exchange.  Unable to obtain history from the patient patient is nonverbal at baseline.  Review of Systems:  Review of Systems  Unable to obtain history from the patient nonverbal at baseline  Past Medical and Surgical History:   Past Medical History:   Diagnosis Date    ADHD     Anoxic brain damage (HCC)     Autistic disorder     Breakthrough seizure (HCC) 8/1/2019    Dysphagia     Dysphagia, oropharyngeal phase     Gastrostomy tube dependent (HCC)     14 Fr as of 05/19/2020    Hyperammonemia (HCC)     Hyperkeratosis     Hypotension     Intellectual disability     Lennox-Gastaut syndrome with tonic seizures (HCC)     Lethargy     Liver enzyme elevation     Onychomycosis     Osteoporosis     Osteoporosis        Past Surgical  History:   Procedure Laterality Date    ABDOMINAL SURGERY      CARDIAC PACEMAKER PLACEMENT      vns implant l chest    IR GASTROJEJUNOSTOMY (GJ) TUBE PLACEMENT  12/8/2023    IR GASTROSTOMY (G) TUBE CHECK/CHANGE/REINSERTION/UPSIZE  6/21/2024    IR GASTROSTOMY TUBE PLACEMENT  11/21/2019    IR THORACENTESIS  12/17/2018    JEJUNOSTOMY FEEDING TUBE      history of - most recently, PEG tube    PEG TUBE PLACEMENT      VT INSJ/RPLCMT CRANIAL NEUROSTIM PULSE GENERATOR Left 12/18/2019    Procedure: REPLACEMENT IMPLANTABLE PULSE GENERATOR FOR VAGAL NERVE STIMULATOR, LEFT CHEST;  Surgeon: Patrick Canales MD;  [facility];  Service: Neurosurgery       Meds/Allergies:  Prior to Admission medications    Medication Sig Start Date End Date Taking? Authorizing Provider   acetaminophen (TYLENOL) 325 mg tablet Take 650 mg by mouth every 4 (four) hours as needed for mild pain or fever    Historical Provider, MD   bisacodyl (DULCOLAX) 10 mg suppository Insert 10 mg into the rectum daily    Historical Provider, MD   brivaracetam (BRIVIACT) 50 MG TABS tablet Give 1 tablet via PEG tube q12 hours 1/25/24   Quynh España MD   cannabidiol (Epidiolex) 100 mg/ml SOLN 5 mL (500 mg total) by Per G Tube route 2 (two) times a day 2/8/24   Quynh España MD   cloBAZam (ONFI) 10 MG tablet Give PEG-tube half a tab in the morning and one tab at bedtime 3/22/24   Quynh España MD   diazepam (VALIUM) 5 MG/ML solution If the patient has a seizure lasting more than 3 minutes then give 1mL by PEG tube, may repeat 1mL if she continues to have seizure for more than 10 minutes. 1/25/24   Quynh España MD   lacosamide (VIMPAT) 200 mg tablet Give 1 tablet via PEG tube q12 hours  Patient not taking: Reported on 7/29/2024 1/25/24   Quynh España MD   Probiotic Product (ALEXANDER-BID PROBIOTIC PO) 1 tablet by Per G Tube route daily    Patient not taking: Reported on 7/29/2024    Historical Provider, MD   rufinamide (BANZEL) 400 mg tablet 4 tablets (1,600  mg total) by Per G Tube route every 12 (twelve) hours 1/25/24   Quynh España MD   topiramate (TOPAMAX) 50 MG tablet 5 tablets (250 mg total) by Per G Tube route every 12 (twelve) hours 1/25/24   Quynh España MD   VITAMIN D PO Take 1,000 mg by mouth in the morning Given in her G-tube    Historical Provider, MD     I have reveiwed home medications using records provided by [facility].    Allergies:   Allergies   Allergen Reactions    Felbamate        Social History:  Marital Status: Single   Occupation:   Patient Pre-hospital Living Situation: Skilled Nursing Facility: [place]  Patient Pre-hospital Level of Mobility: non-ambulatory/bed bound  Patient Pre-hospital Diet Restrictions:   Substance Use History:   Social History     Substance and Sexual Activity   Alcohol Use Not Currently     Social History     Tobacco Use   Smoking Status Never   Smokeless Tobacco Never     Social History     Substance and Sexual Activity   Drug Use Not Currently       Family History:  History reviewed. No pertinent family history.    Physical Exam:     Vitals:        Physical Exam  HENT:      Nose: Nose normal.   Eyes:      Pupils: Pupils are equal, round, and reactive to light.   Cardiovascular:      Rate and Rhythm: Regular rhythm.   Pulmonary:      Comments: Decreased breath sounds bilateral  Abdominal:      Comments: Feeding tube covered with abdominal binder   Musculoskeletal:      Comments: Contractures noted   Skin:     General: Skin is warm.   Neurological:      Mental Status: She is alert.      Comments:  Non  verbal at baseline          Additional Data:     Lab Results:  Results from last 7 days   Lab Units 08/06/24  1026   WBC Thousand/uL 7.33   HEMOGLOBIN g/dL 11.8   HEMATOCRIT % 35.8   PLATELETS Thousands/uL 204   SEGS PCT % 66   LYMPHO PCT % 23   MONO PCT % 8   EOS PCT % 2     Results from last 7 days   Lab Units 08/07/24  1803 08/06/24  1026   SODIUM mmol/L 138 140   POTASSIUM mmol/L 3.7 2.8*   CHLORIDE mmol/L 111* 110*    CO2 mmol/L 20* 22   BUN mg/dL 11 17   CREATININE mg/dL 0.42* 0.43*   ANION GAP mmol/L 7 8   CALCIUM mg/dL 8.6 9.0   ALBUMIN g/dL  --  3.9   TOTAL BILIRUBIN mg/dL  --  0.29   ALK PHOS U/L  --  79   ALT U/L  --  28   AST U/L  --  19   GLUCOSE RANDOM mg/dL 99 139             Lab Results   Component Value Date    HGBA1C 5.3 08/18/2022    HGBA1C 5.6 01/20/2022    HGBA1C 5.1 05/21/2020     Results from last 7 days   Lab Units 08/06/24  1026   LACTIC ACID mmol/L 1.1       Lines/Drains:  Invasive Devices       Peripheral Intravenous Line  Duration             Peripheral IV 08/07/24 Distal;Dorsal (posterior);Right Forearm 1 day              Drain  Duration             Gastrostomy/Enterostomy Percutaneous Interventional Gastrostomy 16 Fr. LUQ 48 days                        Imaging: Reviewed radiology reports from this admission including: CT head  No orders to display       EKG and Other Studies Reviewed on Admission:   EKG: No EKG obtained.    ** Please Note: This note has been constructed using a voice recognition system. **

## 2024-08-08 NOTE — ED NOTES
Pt's brief changed, pt repositioned. New abdominal binder applied.      Mukund Silveira RN  08/08/24 3481

## 2024-08-08 NOTE — ED NOTES
Incontinent of urine. Patient cleaned, new zinc oxide paste placed on skin. Pericare completed. Patient rotated      Khadar Loja RN  08/08/24 0033

## 2024-08-09 LAB
ALBUMIN SERPL BCG-MCNC: 3.9 G/DL (ref 3.5–5)
ALP SERPL-CCNC: 86 U/L (ref 34–104)
ALT SERPL W P-5'-P-CCNC: 33 U/L (ref 7–52)
ANION GAP SERPL CALCULATED.3IONS-SCNC: 9 MMOL/L (ref 4–13)
AST SERPL W P-5'-P-CCNC: 23 U/L (ref 13–39)
BILIRUB SERPL-MCNC: 0.3 MG/DL (ref 0.2–1)
BUN SERPL-MCNC: 12 MG/DL (ref 5–25)
CALCIUM SERPL-MCNC: 8.8 MG/DL (ref 8.4–10.2)
CHLORIDE SERPL-SCNC: 110 MMOL/L (ref 96–108)
CO2 SERPL-SCNC: 19 MMOL/L (ref 21–32)
CREAT SERPL-MCNC: 0.43 MG/DL (ref 0.6–1.3)
ERYTHROCYTE [DISTWIDTH] IN BLOOD BY AUTOMATED COUNT: 13.2 % (ref 11.6–15.1)
GFR SERPL CREATININE-BSD FRML MDRD: 124 ML/MIN/1.73SQ M
GLUCOSE SERPL-MCNC: 139 MG/DL (ref 65–140)
HCT VFR BLD AUTO: 37.9 % (ref 34.8–46.1)
HGB BLD-MCNC: 12.5 G/DL (ref 11.5–15.4)
MCH RBC QN AUTO: 32.4 PG (ref 26.8–34.3)
MCHC RBC AUTO-ENTMCNC: 33 G/DL (ref 31.4–37.4)
MCV RBC AUTO: 98 FL (ref 82–98)
PLATELET # BLD AUTO: 234 THOUSANDS/UL (ref 149–390)
PMV BLD AUTO: 11.7 FL (ref 8.9–12.7)
POTASSIUM SERPL-SCNC: 3.4 MMOL/L (ref 3.5–5.3)
PROT SERPL-MCNC: 7.1 G/DL (ref 6.4–8.4)
RBC # BLD AUTO: 3.86 MILLION/UL (ref 3.81–5.12)
SODIUM SERPL-SCNC: 138 MMOL/L (ref 135–147)
WBC # BLD AUTO: 7.59 THOUSAND/UL (ref 4.31–10.16)

## 2024-08-09 PROCEDURE — 99223 1ST HOSP IP/OBS HIGH 75: CPT | Performed by: PHYSICIAN ASSISTANT

## 2024-08-09 PROCEDURE — 85027 COMPLETE CBC AUTOMATED: CPT | Performed by: FAMILY MEDICINE

## 2024-08-09 PROCEDURE — 99232 SBSQ HOSP IP/OBS MODERATE 35: CPT | Performed by: PHYSICIAN ASSISTANT

## 2024-08-09 PROCEDURE — 95970 ALYS NPGT W/O PRGRMG: CPT | Performed by: PSYCHIATRY & NEUROLOGY

## 2024-08-09 PROCEDURE — 99222 1ST HOSP IP/OBS MODERATE 55: CPT | Performed by: STUDENT IN AN ORGANIZED HEALTH CARE EDUCATION/TRAINING PROGRAM

## 2024-08-09 PROCEDURE — 80053 COMPREHEN METABOLIC PANEL: CPT | Performed by: FAMILY MEDICINE

## 2024-08-09 PROCEDURE — 87081 CULTURE SCREEN ONLY: CPT | Performed by: FAMILY MEDICINE

## 2024-08-09 RX ORDER — POTASSIUM CHLORIDE 14.9 MG/ML
20 INJECTION INTRAVENOUS ONCE
Status: COMPLETED | OUTPATIENT
Start: 2024-08-09 | End: 2024-08-09

## 2024-08-09 RX ADMIN — Medication 500 MG: at 22:06

## 2024-08-09 RX ADMIN — Medication 400 UNITS: at 22:07

## 2024-08-09 RX ADMIN — BRIVARACETAM 50 MG: 10 SOLUTION ORAL at 22:06

## 2024-08-09 RX ADMIN — BISACODYL 10 MG: 10 SUPPOSITORY RECTAL at 10:39

## 2024-08-09 RX ADMIN — TOPIRAMATE 250 MG: 100 TABLET, FILM COATED ORAL at 22:08

## 2024-08-09 RX ADMIN — CLOBAZAM 10 MG: 2.5 SUSPENSION ORAL at 10:41

## 2024-08-09 RX ADMIN — RUFINAMIDE 1600 MG: 200 TABLET, FILM COATED ORAL at 10:41

## 2024-08-09 RX ADMIN — LACOSAMIDE 200 MG: 10 SOLUTION ORAL at 10:40

## 2024-08-09 RX ADMIN — CLOBAZAM 10 MG: 2.5 SUSPENSION ORAL at 22:07

## 2024-08-09 RX ADMIN — RUFINAMIDE 1600 MG: 200 TABLET, FILM COATED ORAL at 22:07

## 2024-08-09 RX ADMIN — BRIVARACETAM 50 MG: 10 SOLUTION ORAL at 10:39

## 2024-08-09 RX ADMIN — TOPIRAMATE 250 MG: 100 TABLET, FILM COATED ORAL at 10:42

## 2024-08-09 RX ADMIN — POTASSIUM CHLORIDE 20 MEQ: 14.9 INJECTION, SOLUTION INTRAVENOUS at 17:14

## 2024-08-09 RX ADMIN — Medication 500 MG: at 10:48

## 2024-08-09 RX ADMIN — LACOSAMIDE 200 MG: 10 SOLUTION ORAL at 22:06

## 2024-08-09 NOTE — ASSESSMENT & PLAN NOTE
Patient with Lennox Gastuat syndrome and followed by neurology as outpatient  Patient has VNS.  Was recently seen in neurosurgery office for VNS battery exchange but unable to obtain consent.  Patient was sent to the emergency room with persistent seizure from the facility.  Transferred to [facility] for evaluation by neurosurgery and for VNS battery exchange  Neurology following,  Continue anti seizure regimen of Briviact 50 mg BID, Epidiolex 500 mg BID, clobazam 10 mg BID, Vimpat 200 mg BID, Rufinamide 1600 mg BID and Topamax 250 mg BID   Plan for VNS battery exchange tomorrow with neurosurgery

## 2024-08-09 NOTE — ASSESSMENT & PLAN NOTE
History of cerebral anoxic injury   Patient with significant debility, nursing home resident  With resultant intractable seizure  Continue with supportive care

## 2024-08-09 NOTE — ASSESSMENT & PLAN NOTE
Plan for VNS battery replacement with neurosurgery tomorrow  Last battery change was in 2019, battery now noted to be near end of life   Additional plan as above

## 2024-08-09 NOTE — PROGRESS NOTES
Patient:    MRN:  [id number]    Abdelrahman Request ID:  [id number]    Level of care reserved:  Skilled Nursing Facility    Partner Reserved:  [facility] [place] [address] [phone]    Clinical needs requested:    Geography searched:  10 miles around [phone]    Start of Service:    Request sent:  11:10am EDT on 8/9/2024 by Tylor Jarvis    Partner reserved:  11:52am EDT on 8/9/2024 by Tylor Jarvis    Choice list shared:

## 2024-08-09 NOTE — CONSULTS
[facility]  Consult  Name: Maira Bull 44 y.o. female I MRN: [id number]  Unit/Bed#: PPHP 607-01 I Date of Admission: 8/8/2024   Date of Service: 8/9/2024 I Hospital Day: 1    Inpatient consult to Neurosurgery  Consult performed by: Natacha Bahena PA-C  Consult ordered by: Devora Mendez MD          Assessment & Plan   S/P placement of VNS (vagus nerve stimulation) device  Assessment & Plan  Pt presented with intractable seizures.   Pt with hx of VNS. Last VNS battery change in 2019. Recently, VNS battery noted to be near end of life.     Imaging reviewed personally and by attending. Final results as below  CT head wo 8/6/24: No acute intracranial hemorrhage, mass effect or edema.     Plan  Continue regular neurologic checks.  Ongoing medical management per primary team.   Neurology following. AEDs per neurology.  Eval and mobilize per PT/OT  DVT PPX: SCDs  Tentative plan for VNS battery replacement tomorrow 8/10/24 by Dr. Davenport.   Neurosurgery will continue to follow. Please call with any questions or concerns.            History of Present Illness   History, ROS and PFSH obtained from chart review. Pt non-verbal at baseline.   HPI: Maira Bull is a 44 y.o. female with PMH including epilepsy, Lennox Gestaut syndrome, anoxic brain injury with significant intellectual disability, hx of  VNS insertion for seizures with last battery replacement in 2019 who currently lives in a nursing home who presented with intractable seizures.  Per report, pt had 15-20 seizures despite AEDs. Pt was recently found to have VNS battery end of life that requires replacement.     Review of Systems   Unable to perform ROS: Patient nonverbal   Neurological:  Positive for seizures.       Historical Information   Past Medical History:   Diagnosis Date    ADHD     Anoxic brain damage (HCC)     Autistic disorder     Breakthrough seizure (HCC) 8/1/2019    Dysphagia     Dysphagia, oropharyngeal  phase     Gastrostomy tube dependent (HCC)     14 Fr as of 05/19/2020    Hyperammonemia (HCC)     Hyperkeratosis     Hypotension     Intellectual disability     Lennox-Gastaut syndrome with tonic seizures (HCC)     Lethargy     Liver enzyme elevation     Onychomycosis     Osteoporosis     Osteoporosis      Past Surgical History:   Procedure Laterality Date    ABDOMINAL SURGERY      CARDIAC PACEMAKER PLACEMENT      vns implant l chest    IR GASTROJEJUNOSTOMY (GJ) TUBE PLACEMENT  12/8/2023    IR GASTROSTOMY (G) TUBE CHECK/CHANGE/REINSERTION/UPSIZE  6/21/2024    IR GASTROSTOMY TUBE PLACEMENT  11/21/2019    IR THORACENTESIS  12/17/2018    JEJUNOSTOMY FEEDING TUBE      history of - most recently, PEG tube    PEG TUBE PLACEMENT      AR INSJ/RPLCMT CRANIAL NEUROSTIM PULSE GENERATOR Left 12/18/2019    Procedure: REPLACEMENT IMPLANTABLE PULSE GENERATOR FOR VAGAL NERVE STIMULATOR, LEFT CHEST;  Surgeon: Patrick Canales MD;  Location: [facility];  Service: Neurosurgery     Social History     Substance and Sexual Activity   Alcohol Use Not Currently     Social History     Substance and Sexual Activity   Drug Use Not Currently     Social History     Tobacco Use   Smoking Status Never   Smokeless Tobacco Never     History reviewed. No pertinent family history.    Meds/Allergies   all current active meds have been reviewed, current meds:   Current Facility-Administered Medications   Medication Dose Route Frequency    acetaminophen (TYLENOL) oral suspension 650 mg  650 mg Per G Tube Q4H PRN    bisacodyl (DULCOLAX) rectal suppository 10 mg  10 mg Rectal Daily    Brivaracetam (BRIVIACT) oral solution 50 mg  50 mg Per PEG Tube BID    cannabidiol (EPIDIOLEX) oral solution 500 mg  500 mg Per G Tube BID    cholecalciferol (VITAMIN D) oral liquid 400 Units  400 Units Per G Tube Daily    cloBAZam (ONFI) oral suspension 10 mg  10 mg Per G Tube BID    lacosamide (VIMPAT) oral solution 200 mg  200 mg Per G Tube Q12H NANDO    LORazepam (ATIVAN)  injection 1 mg  1 mg Intravenous Once PRN    rufinamide (Banzel) suspension 1,600 mg  1,600 mg Per G Tube Q12H    topiramate (TOPAMAX) tablet 250 mg  250 mg Per G Tube Q12H NANDO   , and PTA meds:   Prior to Admission Medications   Prescriptions Last Dose Informant Patient Reported? Taking?   Probiotic Product (ALEXANDER-BID PROBIOTIC PO) Unknown Outside Facility (Specify) Yes No   Si tablet by Per G Tube route daily   VITAMIN D PO Unknown Outside Facility (Specify) Yes No   Sig: Take 1,000 mg by mouth in the morning Given in her G-tube   acetaminophen (TYLENOL) 325 mg tablet Unknown Outside Facility (Specify) Yes No   Sig: Take 650 mg by mouth every 4 (four) hours as needed for mild pain or fever   bisacodyl (DULCOLAX) 10 mg suppository Unknown  Yes No   Sig: Insert 10 mg into the rectum daily   brivaracetam (BRIVIACT) 50 MG TABS tablet Unknown  No No   Sig: Give 1 tablet via PEG tube q12 hours   cannabidiol (Epidiolex) 100 mg/ml SOLN Unknown  No No   Si mL (500 mg total) by Per G Tube route 2 (two) times a day   cloBAZam (ONFI) 10 MG tablet Unknown  No No   Sig: Give PEG-tube half a tab in the morning and one tab at bedtime   diazepam (VALIUM) 5 MG/ML solution Unknown  No No   Sig: If the patient has a seizure lasting more than 3 minutes then give 1mL by PEG tube, may repeat 1mL if she continues to have seizure for more than 10 minutes.   lacosamide (VIMPAT) 200 mg tablet Unknown  No No   Sig: Give 1 tablet via PEG tube q12 hours   Patient not taking: Reported on 2024   rufinamide (BANZEL) 400 mg tablet Unknown  No No   Si tablets (1,600 mg total) by Per G Tube route every 12 (twelve) hours   topiramate (TOPAMAX) 50 MG tablet Unknown  No No   Si tablets (250 mg total) by Per G Tube route every 12 (twelve) hours      Facility-Administered Medications: None     Allergies   Allergen Reactions    Felbamate        Objective   I/O          07 07 07 07 0701  08/10 07     NG/GT  366 400    Feedings  388     Total Intake(mL/kg)  754 (17) 400 (9)    Urine (mL/kg/hr)   0 (0)    Stool   1    Total Output   1    Net  +754 +399           Unmeasured Urine Occurrence  3 x 1 x            Physical Exam  Constitutional:       General: She is not in acute distress.     Appearance: She is well-developed.   HENT:      Head: Normocephalic.   Eyes:      Extraocular Movements: Extraocular movements intact.      Pupils: Pupils are equal, round, and reactive to light.   Neck:      Trachea: No tracheal deviation.   Cardiovascular:      Rate and Rhythm: Normal rate.   Pulmonary:      Effort: Pulmonary effort is normal.   Abdominal:      Palpations: Abdomen is soft.      Tenderness: There is no abdominal tenderness. There is no guarding.   Musculoskeletal:      Cervical back: Normal range of motion and neck supple.   Skin:     General: Skin is warm and dry.      Coloration: Skin is not pale.   Neurological:      Mental Status: She is alert.      Comments: Pt is alert and awake. Non-verbal.     Motor: Rigidity/contractures noted in BUE/BLE. Pt tries to follow commands such as reaches out to try to squeeze bilateral hands to command though has poor coordination.      Sensation: exam limited due to being non-verbal.     Coordination: dysmetria in BUE.     Reflexes: no clonus or colindres's.            Neurologic Exam     Cranial Nerves     CN III, IV, VI   Pupils are equal, round, and reactive to light.      Vitals:Blood pressure 96/61, pulse 85, temperature (!) 96.5 °F (35.8 °C), resp. rate 18, height 5' (1.524 m), weight 44.4 kg (97 lb 14.4 oz), SpO2 96%.,Body mass index is 19.12 kg/m².     Lab Results:   Results from last 7 days   Lab Units 08/09/24 0448 08/06/24  1026   WBC Thousand/uL 7.59 7.33   HEMOGLOBIN g/dL 12.5 11.8   HEMATOCRIT % 37.9 35.8   PLATELETS Thousands/uL 234 204   SEGS PCT %  --  66   MONO PCT %  --  8   EOS PCT %  --  2     Results from last 7 days   Lab Units 08/09/24 0448  "08/07/24  1803 08/06/24  1026   POTASSIUM mmol/L 3.4* 3.7 2.8*   CHLORIDE mmol/L 110* 111* 110*   CO2 mmol/L 19* 20* 22   BUN mg/dL 12 11 17   CREATININE mg/dL 0.43* 0.42* 0.43*   CALCIUM mg/dL 8.8 8.6 9.0   ALK PHOS U/L 86  --  79   ALT U/L 33  --  28   AST U/L 23  --  19     Results from last 7 days   Lab Units 08/06/24  1026   MAGNESIUM mg/dL 1.9             No results found for: \"TROPONINT\"  ABG:  Lab Results   Component Value Date    PHART 7.445 12/20/2018    EZA8DHG 41.4 12/20/2018    PO2ART 83.0 12/20/2018    RMA6NQF 27.8 12/20/2018    BEART 3.4 12/20/2018    SOURCE Radial, Left 12/20/2018       Imaging Studies: I have personally reviewed pertinent reports.   and I have personally reviewed pertinent films in PACS    EKG, Pathology, and Other Studies: I have personally reviewed pertinent reports.      VTE Prophylaxis: Sequential compression device (Venodyne)     Code Status: Level 1 - Full Code  Advance Directive and Living Will: Yes    Power of :    POLST:      Counseling / Coordination of Care  I spent  75 min  with the patient, coordination of care to include finding the VNS rep from [facility] to obtain a replacement battery and back up, coordination with team to clear for surgery, coordination for surgical planning and finding  for providing consent.     PLEASE NOTE:  This encounter may have been completed utilizing the [employer] Direct Speech Voice Recognition Software. Grammatical errors, random word insertions, pronoun errors and incomplete sentences are occasional consequences of the system due to software limitations, ambient noise and hardware issues.These may be missed even after proof reading prior to affixing electronic signature. Please do not hesitate to contact me directly if you have any questions or concerns about the content, text or information contained within the body of this dictation.      "

## 2024-08-09 NOTE — CASE MANAGEMENT
Case Management Assessment & Discharge Planning Note    Patient name Maira Bull  Location [facility] 607-01 MRN [id number]  : 1980 Date 2024       Current Admission Date: 2024  Current Admission Diagnosis:Intractable epilepsy with status epilepticus (HCC)   Patient Active Problem List    Diagnosis Date Noted Date Diagnosed    Fracture of tooth 2024     PEG tube malfunction (HCC) 2020     Labyrinthine disorder 03/10/2020     Intellectual disability with epilepsy (HCC) 12/10/2019     Fatty infiltration of liver 2019     Moderate protein-calorie malnutrition (HCC) 2019     Dislodged gastrostomy tube 2019     S/P placement of VNS (vagus nerve stimulation) device 2019     Breast mass 01/15/2019     Sedated due to multiple medications 01/10/2019     Hypophosphatemia 2018     Right atrial enlargement 10/13/2018     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     Skin breakdown 10/09/2018     Incontinence 2018     Low blood pressure 2018     Somnolence 2018     Intractable epilepsy with status epilepticus (HCC) 2018     Underweight 2017     Labial cyst 2017     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HCC) 2017     Osteoporosis 2013     Onychomycosis 2013     Hyperkeratosis 2013     Cerebral anoxic injury (HCC) 2013       LOS (days): 1  Geometric Mean LOS (GMLOS) (days): 2.7  Days to GMLOS:2     OBJECTIVE:    Risk of Unplanned Readmission Score: 8.01   Current admission status: Inpatient       Preferred Pharmacy:   [facility], [facility] [id number], [address], [place]  [address] [facility] [address]  Phone: [phone] Fax: [phone]    [facility] - [place], [place] - [address]  [address]  [address]  [facility] [address]  Phone: [phone] Fax: [phone]    [facility], [place] - [address]  [address] [facility]  [place] [address]  Phone: [phone] Fax: [phone]    [facility], [place] - [address]  [address] [facility] [place] [address]  Phone: [phone] Fax: [phone]    [facility] [place] [address]  [address]  [place] [address]  Phone: [phone] Fax: [phone]    [facility] #[id number] - [place], [place] - [address]  [address] [place]  [place] [address]  Phone: [phone] Fax: [phone]    Primary Care Provider: Doe Miller DO    Primary Insurance: MEDICARE  Secondary Insurance: [facility]    ASSESSMENT:  Active Health Care Proxies    There are no active Health Care Proxies on file.     Readmission Root Cause  30 Day Readmission: No    Patient Information  Admitted from:: Home  Mental Status: Other (Comment) (non-verbal)  During Assessment patient was accompanied by: Not accompanied during assessment  Assessment information provided by:: Other - please comment (Lacey (nurse at facility))  Primary Caregiver: Other (Comment)  Caregiver's Name:: [facility] Staff  Caregiver's Relationship to Patient:: Other (Specify) ([facility] Staff)  Support Systems: Other (Comment), Parent ([facility] Staff)  County of Residence: [facility]  What [facility] do you live in?: [place]  Home entry access options. Select all that apply.:  (Facility)  Type of Current Residence: Facility  Upon entering residence, is there a bedroom on the main floor (no further steps)?: Yes  Upon entering residence, is there a bathroom on the main floor (no further steps)?: Yes  Living Arrangements: Other (Comment) (Facility)  Is patient a ?: No    Activities of Daily Living Prior to Admission  Functional Status: Assistance  Completes ADLs independently?: No  Level of ADL dependence: Assistance (2 person assist)  Ambulates independently?: No  Level of ambulatory dependence: Assistance (2 person assist)  Does patient use assisted devices?:  Yes  Assisted Devices (DME) used: Wheelchair  Does patient currently own DME?: Yes  Does patient have a history of Outpatient Therapy (PT/OT)?: No  Does the patient have a history of Short-Term Rehab?: No  Does patient have a history of HHC?: No  Does patient currently have HHC?: No    Patient Information Continued  Income Source: SSI/SSD  Does patient have prescription coverage?: Yes  Does patient receive dialysis treatments?: No  Does patient have a history of substance abuse?: No  Does patient have a history of Mental Health Diagnosis?: Yes  Is patient receiving treatment for mental health?: Yes (Intellectual disability with epilepsy)  Has patient received inpatient treatment related to mental health in the last 2 years?: No    Means of Transportation  Means of Transport to App:: Other (Comment) (Facility transport)  Social Determinants of Health (SDOH)      Flowsheet Row Most Recent Value   Housing Stability    In the last 12 months, was there a time when you were not able to pay the mortgage or rent on time? N   At any time in the past 12 months, were you homeless or living in a shelter (including now)? N   Transportation Needs    In the past 12 months, has lack of transportation kept you from medical appointments or from getting medications? no   In the past 12 months, has lack of transportation kept you from meetings, work, or from getting things needed for daily living? No   Food Insecurity    Within the past 12 months, you worried that your food would run out before you got the money to buy more. Never true   Within the past 12 months, the food you bought just didn't last and you didn't have money to get more. Never true   Utilities    In the past 12 months has the electric, gas, oil, or water company threatened to shut off services in your home? No        DISCHARGE DETAILS:    Discharge planning discussed with:: Aleksey (nurse at [facility])  Freedom of Choice: Yes  Comments -  Freedom of Choice: Discussed FOC     Were Treatment Team discharge recommendations reviewed with patient/caregiver?: Yes  Did patient/caregiver verbalize understanding of patient care needs?: N/A- going to facility  Were patient/caregiver advised of the risks associated with not following Treatment Team discharge recommendations?: Yes    Other Referral/Resources/Interventions Provided:  Interventions: SNF, Assisted Living  Referral Comments: This CM Spoke with Brianne in admissions and Lacey ( nurse at [facility]). Pt is a permanent resident at [facility] and will return when d/c. referral back  to [facility] (formally known as [facility]) entered in aidin.    Treatment Team Recommendation: SNF, Assisted Living  Discharge Destination Plan:: SNF, Assisted Living  CM reviewed d/c planning process including the following: identifying help at home, patient preference for d/c planning needs, Discharge Lounge, [facility] Meds to Bed program, availability of treatment team to discuss questions or concerns patient and/or family may have regarding understanding medications and recognizing signs and symptoms once discharged.  CM also encouraged patient to follow up with all recommended appointments after discharge. Patient advised of importance for patient and family to participate in managing patient’s medical well being.     This CM introduced self and role to [facility] staff. Pt is a permanent resident. Pt is a 2 person assist. Pt ambulates by wheel chair. Pt is assisted with ADLs, and ambulating.Pt will return to facility when stable for d/c.

## 2024-08-09 NOTE — PROCEDURES
VNS interrogation procedure note    I interrogated Maira's VNS today at 10:38, which is in her upper left chest wall. The device responded and was functioning appropriately. No changes were made and settings were as listed below.    Systems diagnostics: Output: 2mA, Lead impedance 1679. IFI (low battery) notification      VNS Settings ([employer] . SN [id number]. Date implanted 12/18/2019)  Output current 2 mA   Signal frequency 20 Hz   Pulse width 500 ?sec   On time 30 sec   Off time 1.1 min       Autostim current 2 mA   Autostim Pulse width 500 ?sec   Autostim on time 30 sec   Autostim threshold 20 %       Mag current 2.25 mA   Mag pulse width 500 ?sec   Mag on time 60 sec     Battery: 8-15%      Rory Guerra MD   Attending Epileptologist  [facility]  [facility]

## 2024-08-09 NOTE — PLAN OF CARE
Problem: PAIN - ADULT  Goal: Verbalizes/displays adequate comfort level or baseline comfort level  Description: Interventions:  - Encourage patient to monitor pain and request assistance  - Assess pain using appropriate pain scale  - Administer analgesics based on type and severity of pain and evaluate response  - Implement non-pharmacological measures as appropriate and evaluate response  - Consider cultural and social influences on pain and pain management  - Notify physician/advanced practitioner if interventions unsuccessful or patient reports new pain  Outcome: Progressing     Problem: INFECTION - ADULT  Goal: Absence or prevention of progression during hospitalization  Description: INTERVENTIONS:  - Assess and monitor for signs and symptoms of infection  - Monitor lab/diagnostic results  - Monitor all insertion sites, i.e. indwelling lines, tubes, and drains  - Monitor endotracheal if appropriate and nasal secretions for changes in amount and color  - [place] appropriate cooling/warming therapies per order  - Administer medications as ordered  - Instruct and encourage patient and family to use good hand hygiene technique  - Identify and instruct in appropriate isolation precautions for identified infection/condition  Outcome: Progressing  Goal: Absence of fever/infection during neutropenic period  Description: INTERVENTIONS:  - Monitor WBC    Outcome: Progressing     Problem: SAFETY ADULT  Goal: Patient will remain free of falls  Description: INTERVENTIONS:  - Educate patient/family on patient safety including physical limitations  - Instruct patient to call for assistance with activity   - Consult OT/PT to assist with strengthening/mobility   - Keep Call bell within reach  - Keep bed low and locked with side rails adjusted as appropriate  - Keep care items and personal belongings within reach  - Initiate and maintain comfort rounds  - Make Fall Risk Sign visible to staff  - Offer Toileting every 2 Hours,  in advance of need  - Initiate/Maintain bed alarm  - Obtain necessary fall risk management equipment: alarm  - Apply yellow socks and bracelet for high fall risk patients  - Consider moving patient to room near nurses station  Outcome: Progressing  Goal: Maintain or return to baseline ADL function  Description: INTERVENTIONS:  -  Assess patient's ability to carry out ADLs; assess patient's baseline for ADL function and identify physical deficits which impact ability to perform ADLs (bathing, care of mouth/teeth, toileting, grooming, dressing, etc.)  - Assess/evaluate cause of self-care deficits   - Assess range of motion  - Assess patient's mobility; develop plan if impaired  - Assess patient's need for assistive devices and provide as appropriate  - Encourage maximum independence but intervene and supervise when necessary  - Involve family in performance of ADLs  - Assess for home care needs following discharge   - Consider OT consult to assist with ADL evaluation and planning for discharge  - Provide patient education as appropriate  Outcome: Progressing  Goal: Maintains/Returns to pre admission functional level  Description: INTERVENTIONS:  - Perform AM-PAC 6 Click Basic Mobility/ Daily Activity assessment daily.  - Set and communicate daily mobility goal to care team and patient/family/caregiver.   - Collaborate with rehabilitation services on mobility goals if consulted  - Perform Range of Motion  times a day.  - Reposition patient every  hours.  - Dangle patient  times a day  - Stand patient  times a day  - Ambulate patient  times a day  - Out of bed to chair  times a day   - Out of bed for meals  times a day  - Out of bed for toileting  - Record patient progress and toleration of activity level   Outcome: Progressing     Problem: DISCHARGE PLANNING  Goal: Discharge to home or other facility with appropriate resources  Description: INTERVENTIONS:  - Identify barriers to discharge w/patient and caregiver  -  Arrange for needed discharge resources and transportation as appropriate  - Identify discharge learning needs (meds, wound care, etc.)  - Arrange for interpretive services to assist at discharge as needed  - Refer to Case Management Department for coordinating discharge planning if the patient needs post-hospital services based on physician/advanced practitioner order or complex needs related to functional status, cognitive ability, or social support system  Outcome: Progressing     Problem: Knowledge Deficit  Goal: Patient/family/caregiver demonstrates understanding of disease process, treatment plan, medications, and discharge instructions  Description: Complete learning assessment and assess knowledge base.  Interventions:  - Provide teaching at level of understanding  - Provide teaching via preferred learning methods  Outcome: Progressing     Problem: Prexisting or High Potential for Compromised Skin Integrity  Goal: Skin integrity is maintained or improved  Description: INTERVENTIONS:  - Identify patients at risk for skin breakdown  - Assess and monitor skin integrity  - Assess and monitor nutrition and hydration status  - Monitor labs   - Assess for incontinence   - Turn and reposition patient  - Assist with mobility/ambulation  - Relieve pressure over bony prominences  - Avoid friction and shearing  - Provide appropriate hygiene as needed including keeping skin clean and dry  - Evaluate need for skin moisturizer/barrier cream  - Collaborate with interdisciplinary team   - Patient/family teaching  - Consider wound care consult   Outcome: Progressing

## 2024-08-09 NOTE — PROGRESS NOTES
[facility]  Progress Note  Name: Maira Bull I  MRN: [id number]  Unit/Bed#: [facility] 607-01 I Date of Admission: 8/8/2024   Date of Service: 8/9/2024 I Hospital Day: 1    Assessment & Plan   * Intractable epilepsy with status epilepticus (HCC)  Assessment & Plan  Patient with Lennox Gastuat syndrome and followed by neurology as outpatient  Patient has VNS.  Was recently seen in neurosurgery office for VNS battery exchange but unable to obtain consent.  Patient was sent to the emergency room with persistent seizure from the facility.  Transferred to [facility] for evaluation by neurosurgery and for VNS battery exchange  Neurology following,  Continue anti seizure regimen of Briviact 50 mg BID, Epidiolex 500 mg BID, clobazam 10 mg BID, Vimpat 200 mg BID, Rufinamide 1600 mg BID and Topamax 250 mg BID   Plan for VNS battery exchange tomorrow with neurosurgery     Intellectual disability with epilepsy (HCC)  Assessment & Plan  Currently a resident of a skilled nursing facility.    Nonverbal at baseline  Has PEG tube in place, continue with tube feeds and meds per tube    Cerebral anoxic injury (HCC)  Assessment & Plan  History of cerebral anoxic injury   Patient with significant debility, nursing home resident  With resultant intractable seizure  Continue with supportive care    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HCC)  Assessment & Plan  Plan for VNS battery replacement with neurosurgery tomorrow  Last battery change was in 2019, battery now noted to be near end of life   Additional plan as above          VTE Pharmacologic Prophylaxis: VTE Score: 1 Low Risk (Score 0-2) - Encourage Ambulation.    Mobility:   Basic Mobility Inpatient Raw Score: 8  JH-HLM Goal: 3: Sit at edge of bed  JH-HLM Achieved: 1: Laying in bed  JH-HLM Goal NOT achieved. Continue with multidisciplinary rounding and encourage appropriate mobility to improve upon JH-HLM goals.    Patient Centered Rounds: I evaluated the  patient without nursing staff present due to speaking to nurse outside patient's room     Discussions with Specialists or Other Care Team Provider: Discussed with RN, CM, neurosurgery and reviewed neurology notes     Education and Discussions with Family / Patient: Updated  (father) via phone.    Total Time Spent on Date of Encounter in care of patient: 40 mins. This time was spent on one or more of the following: performing physical exam; counseling and coordination of care; obtaining or reviewing history; documenting in the medical record; reviewing/ordering tests, medications or procedures; communicating with other healthcare professionals and discussing with patient's family/caregivers.    Current Length of Stay: 1 day(s)  Current Patient Status: Inpatient   Certification Statement: The patient will continue to require additional inpatient hospital stay due to VNS battery change tomorrow with neurosurgery, ongoing neurology recommendations   Discharge Plan: Anticipate discharge in 48-72 hrs to prior facility     Code Status: Level 1 - Full Code    Subjective:   Pt non verbal at baseline, resting comfortably in bed. Opens eyes to verbal stimuli.     Objective:     Vitals:   Temp (24hrs), Av.2 °F (36.8 °C), Min:96.5 °F (35.8 °C), Max:99.6 °F (37.6 °C)    Temp:  [96.5 °F (35.8 °C)-99.6 °F (37.6 °C)] 96.5 °F (35.8 °C)  HR:  [] 85  Resp:  [16-18] 18  BP: ()/(61-81) 96/61  SpO2:  [94 %-99 %] 96 %  Body mass index is 19.12 kg/m².     Input and Output Summary (last 24 hours):     Intake/Output Summary (Last 24 hours) at 2024 1646  Last data filed at 2024 1342  Gross per 24 hour   Intake 1154 ml   Output 1 ml   Net 1153 ml       Physical Exam:   Physical Exam  Vitals reviewed.   Constitutional:       Comments: Pt is in no acute distress lying in her hospital bed resting comfortably   Debility, non verbal at baseline    Cardiovascular:      Rate and Rhythm: Normal rate and regular  rhythm.   Pulmonary:      Effort: No respiratory distress.      Breath sounds: Normal breath sounds. No wheezing.   Abdominal:      Comments: PEG tube in place    Skin:     General: Skin is warm and dry.          Additional Data:     Labs:  Results from last 7 days   Lab Units 08/09/24  0448 08/06/24  1026   WBC Thousand/uL 7.59 7.33   HEMOGLOBIN g/dL 12.5 11.8   HEMATOCRIT % 37.9 35.8   PLATELETS Thousands/uL 234 204   SEGS PCT %  --  66   LYMPHO PCT %  --  23   MONO PCT %  --  8   EOS PCT %  --  2     Results from last 7 days   Lab Units 08/09/24  0448   SODIUM mmol/L 138   POTASSIUM mmol/L 3.4*   CHLORIDE mmol/L 110*   CO2 mmol/L 19*   BUN mg/dL 12   CREATININE mg/dL 0.43*   ANION GAP mmol/L 9   CALCIUM mg/dL 8.8   ALBUMIN g/dL 3.9   TOTAL BILIRUBIN mg/dL 0.30   ALK PHOS U/L 86   ALT U/L 33   AST U/L 23   GLUCOSE RANDOM mg/dL 139                 Results from last 7 days   Lab Units 08/06/24  1026   LACTIC ACID mmol/L 1.1       Lines/Drains:  Invasive Devices       Peripheral Intravenous Line  Duration             Peripheral IV 08/07/24 Distal;Dorsal (posterior);Right Forearm 2 days              Drain  Duration             Gastrostomy/Enterostomy Percutaneous Interventional Gastrostomy 16 Fr. LUQ 49 days                          Imaging: No pertinent imaging reviewed.    Recent Cultures (last 7 days):         Last 24 Hours Medication List:   Current Facility-Administered Medications   Medication Dose Route Frequency Provider Last Rate    acetaminophen  650 mg Per G Tube Q4H PRN Devora Mendez MD      bisacodyl  10 mg Rectal Daily Devora Mendez MD      Brivaracetam  50 mg Per PEG Tube BID Devora Mnedez MD      cannabidiol  500 mg Per G Tube BID Devora Mendez MD      cholecalciferol  400 Units Per G Tube Daily Devora Mendez MD      cloBAZam  10 mg Per G Tube BID Devora Mendez MD      lacosamide  200 mg Per G Tube Q12H [facility] Devora Mendez MD      LORazepam  1 mg Intravenous Once PRN Pooja  DO Casey      rufinamide  1,600 mg Per G Tube Q12H Devora Mendez MD      topiramate  250 mg Per G Tube Q12H [facility] Devora Mendez MD          Today, Patient Was Seen By: Krysten Acevedo PA-C    **Please Note: This note may have been constructed using a voice recognition system.**

## 2024-08-09 NOTE — ASSESSMENT & PLAN NOTE
Pt presented with intractable seizures.   Pt with hx of VNS. Last VNS battery change in 2019. Recently, VNS battery noted to be near end of life.     Imaging reviewed personally and by attending. Final results as below  CT head wo 8/6/24: No acute intracranial hemorrhage, mass effect or edema.     Plan  Continue regular neurologic checks.  Ongoing medical management per primary team.   Neurology following. AEDs per neurology.  Eval and mobilize per PT/OT  DVT PPX: SCDs  Tentative plan for VNS battery replacement tomorrow 8/10/24 by Dr. Davenport.   Neurosurgery will continue to follow. Please call with any questions or concerns.

## 2024-08-09 NOTE — PLAN OF CARE
Problem: PAIN - ADULT  Goal: Verbalizes/displays adequate comfort level or baseline comfort level  Description: Interventions:  - Encourage patient to monitor pain and request assistance  - Assess pain using appropriate pain scale  - Administer analgesics based on type and severity of pain and evaluate response  - Implement non-pharmacological measures as appropriate and evaluate response  - Consider cultural and social influences on pain and pain management  - Notify physician/advanced practitioner if interventions unsuccessful or patient reports new pain  Outcome: Progressing     Problem: INFECTION - ADULT  Goal: Absence or prevention of progression during hospitalization  Description: INTERVENTIONS:  - Assess and monitor for signs and symptoms of infection  - Monitor lab/diagnostic results  - Monitor all insertion sites, i.e. indwelling lines, tubes, and drains  - Monitor endotracheal if appropriate and nasal secretions for changes in amount and color  - [place] appropriate cooling/warming therapies per order  - Administer medications as ordered  - Instruct and encourage patient and family to use good hand hygiene technique  - Identify and instruct in appropriate isolation precautions for identified infection/condition  Outcome: Progressing  Goal: Absence of fever/infection during neutropenic period  Description: INTERVENTIONS:  - Monitor WBC    Outcome: Progressing     Problem: SAFETY ADULT  Goal: Patient will remain free of falls  Description: INTERVENTIONS:  - Educate patient/family on patient safety including physical limitations  - Instruct patient to call for assistance with activity   - Consult OT/PT to assist with strengthening/mobility   - Keep Call bell within reach  - Keep bed low and locked with side rails adjusted as appropriate  - Keep care items and personal belongings within reach  - Initiate and maintain comfort rounds  - Make Fall Risk Sign visible to staff  - Offer Toileting every  Hours,  in advance of need  - Initiate/Maintain alarm  - Obtain necessary fall risk management equipment:   - Apply yellow socks and bracelet for high fall risk patients  - Consider moving patient to room near nurses station  Outcome: Progressing  Goal: Maintain or return to baseline ADL function  Description: INTERVENTIONS:  -  Assess patient's ability to carry out ADLs; assess patient's baseline for ADL function and identify physical deficits which impact ability to perform ADLs (bathing, care of mouth/teeth, toileting, grooming, dressing, etc.)  - Assess/evaluate cause of self-care deficits   - Assess range of motion  - Assess patient's mobility; develop plan if impaired  - Assess patient's need for assistive devices and provide as appropriate  - Encourage maximum independence but intervene and supervise when necessary  - Involve family in performance of ADLs  - Assess for home care needs following discharge   - Consider OT consult to assist with ADL evaluation and planning for discharge  - Provide patient education as appropriate  Outcome: Progressing  Goal: Maintains/Returns to pre admission functional level  Description: INTERVENTIONS:  - Perform AM-PAC 6 Click Basic Mobility/ Daily Activity assessment daily.  - Set and communicate daily mobility goal to care team and patient/family/caregiver.   - Collaborate with rehabilitation services on mobility goals if consulted  - Perform Range of Motion  times a day.  - Reposition patient every  hours.  - Dangle patient times a day  - Stand patient  times a day  - Ambulate patient  times a day  - Out of bed to chair  times a day   - Out of bed for meals  times a day  - Out of bed for toileting  - Record patient progress and toleration of activity level   Outcome: Progressing     Problem: DISCHARGE PLANNING  Goal: Discharge to home or other facility with appropriate resources  Description: INTERVENTIONS:  - Identify barriers to discharge w/patient and caregiver  - Arrange for  needed discharge resources and transportation as appropriate  - Identify discharge learning needs (meds, wound care, etc.)  - Arrange for interpretive services to assist at discharge as needed  - Refer to Case Management Department for coordinating discharge planning if the patient needs post-hospital services based on physician/advanced practitioner order or complex needs related to functional status, cognitive ability, or social support system  Outcome: Progressing     Problem: Knowledge Deficit  Goal: Patient/family/caregiver demonstrates understanding of disease process, treatment plan, medications, and discharge instructions  Description: Complete learning assessment and assess knowledge base.  Interventions:  - Provide teaching at level of understanding  - Provide teaching via preferred learning methods  Outcome: Progressing

## 2024-08-09 NOTE — ASSESSMENT & PLAN NOTE
Currently a resident of a skilled nursing facility.    Nonverbal at baseline  Has PEG tube in place, continue with tube feeds and meds per tube

## 2024-08-09 NOTE — QUICK NOTE
NEUROLOGY RESIDENCY OVERNIGHT CONSULT NOTE     Patient was seen by overnight resident in consultation, and staffed with overnight attending Dr. Guerra.  Full formal consultation to be done by neurology day team.  Neurology team will continue to follow.      Name: Maira Bull   Age & Sex: 44 y.o. female   MRN: [id number]  Unit/Bed#: [facility] 607-01   Encounter: [id number]  Length of Stay: 1    Next OP neurology appointment with Dr. España on 10/25/24.    Pending for discharge: VNS battery replacement per nsx    ASSESSMENT & PLAN     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HCC)  Assessment & Plan  Maira is a 43YO F with history of intractable Lennox-Gaustalt syndrome who presents for VNS battery replacement.    Plan:  Continue anti-seizure regiment of:  Briviact 50mg BID  Epidiolex 500mg BID  Clobazam 10mg BID (at home dosage: 5mg in AM, 10mg QHS)  Vimpat 200mg BID  Rufinamide 1600mg BID  Topamax 250mg BID  VNS battery replacement per neurosurgery team appreciated  Dr. Guerra will reattempt to interrogate VNS on 8/9AM  OP epileptologist follow up appointment on 10/25/24.        SUBJECTIVE     Reason for Consult / Principal Problem: LGS  Hx and PE limited by: nonverbal    HPI: Maira Bull is a 43YO F with history of intractable Lennox-Gastaut syndrome, frequent tonic seizures (especially during sleep), and intermittent witnessed tonic seizures who originally presented to [facility] on 8/6 with c/f 10-15 seizures that day requiring 10mg rectal valium, now transferred to [facility] on 8/8 for neurosurgery evaluation to replace VNS battery. No further seizures since 8/6. LOV with OP neurology on 7/18/24 with Li Vance PA-C, at which time VNS battery life was 8-15%. Met with OP neurosurgery on 7/29 with attempts to contact family to obtain consent for OP surgical procedure to replace VNS battery but unable to get in touch with family.    Attempted to interrogate VNS on 8/8PM, but unable to connect to device suggesting possible depleted  battery.    Current Anti-seizure regiment:  Briviact 50mg BID  Epidiolex 500mg BID  Clobazam 10mg BID (at home dosage: 5mg in AM, 10mg QHS)  Vimpat 200mg BID  Rufinamide 1600mg BID  Topamax 250mg BID  VNS - end of life battery. Pending battery replacement with nsx.    Seizure semiology:  She was described to have drop attacks or spells with grunting sounds and falling to the floor.   Her nurse commented on episodes of spasms or myoclonic jerking of the right arm.   Generalized convulsions  Tonic seizures of eyes rolling back (mostly during sleep)    Special Features  Status epilepticus: yes  Self Injury Seizures: No  Precipitating Factors: menstrual cycle??     Epilepsy Risk Factors:  Abnormal pregnancy: unknown  Abnormal birth/: unknown  Abnormal Development: unknown developmental history  Febrile seizures, simple: unknown  Febrile seizures, complex: unknown  CNS infection: No  Mental retardation: Yes  Cerebral palsy: Yes  Head injury (moderate/severe): possibly anoxic brain injury  CNS neoplasm: No  CNS malformation: No  Neurosurgical procedure: No  Stroke: No  Alcohol abuse: No  Drug abuse: No  Family history Sz/epilepsy: unknown     Prior AEDs:  Rufinamide  Clobazam (excessive sedation)  Clonazepam  Levetiracetam (unclear if beneficial)  Lamotrigine (unclear if beneficial)  Topiramate (missed doses caused breakthrough convulsive type of seizures)  Valproate (elevated ammonia levels)  Fycompa (excess sedation)  Felbamate (listed as a drug allergy)  Phenobarbital (contributing to sedation)  Epidiolex (seems to help the most)  Brivaracetam  Lacosamide     Prior workup:  x   Imaging:  CT head 2013, 2018  No acute intracranial pathology     3/20/2019  MRI brain w/wo contrast  Normal MR brain study  Symmetric hippocampal formations     EEGs:  Continuous video EEG monitoring 10/13 - 10/15/2017  Frequent generalized spike/polyspike-slow wave complexes during sleep  At times there are independent right  more than left midtemporal spikes and bitemporal spikes     Innumerable generalized tonic seizures during sleep, generalized 1 Hz period discharges, that would become continuous for 30 seconds or generalized rhythmic alpha-beta discharges, associated with patient becoming rigid, tonic posturing with arms elevated and tense with subtle jerking of the upper body, eyes opening with upward deviation.  Ictal patterns:  1 - Seconds of diffuse electrodecrement then paroxysmal fast activity followed by 2Hz spike wave discharges (clinically accompanied by posturing of the arms, then clonic jerking)     Continuous video EEG monitoring 10/18 - 10/25/2017  Diffuse background slowing with bursts of arrhythmic generalized spike wave discharges, with shifting lateralization, and independent left and right temporal spikes  Mild eyelid myoclonia associated with brief bursts of 2-2.5 Hz generalized discharges  Tonic seizures with eelctrodecrement  There were innumerable tonic seizures; however by 10/25/2017 the frequency of these seizures did decrease in frequency.     Continuous video EEG monitoring 12/1-12/5/2017  There are innumerable tonic seizures that are generally less than one minute in duration (30-40 seconds), these consists of subtle eye opening and tonic stiffening of arms, if longer then whole body stiffening with clonic jerking movements. These almost always occur exclusively out of sleep. These consist of 2-2.5 Hz generalized spike-slow wave complexes with generalized attenuation of activity (desynchronization) or paroxysmal fast activity. Per 24 hours count of seizures could be .     12/19/2018 routine study  Frequent recurrent tonic seizures associated with paroxysmal generalized fast activity then generalized rhythmic discharges associated with eyes upward deviation, and myoclonic/clonic jerking of the upper body, excess beta activity.     6/28-7/3/2019 continuous video EEG monitoring  Frequent clusters of  tonic seizures (body rigidity, head flexions, eyes go up with dysconjugate gaze, and clonic activity of the arms for a few seconds), these are associated with GPFA and rhythmic spike-slow waves.  There are also bilateral temporal focal epileptiform discharges      Historical Information   Past Medical History:   Diagnosis Date    ADHD     Anoxic brain damage (HCC)     Autistic disorder     Breakthrough seizure (HCC) 8/1/2019    Dysphagia     Dysphagia, oropharyngeal phase     Gastrostomy tube dependent (HCC)     14 Fr as of 05/19/2020    Hyperammonemia (HCC)     Hyperkeratosis     Hypotension     Intellectual disability     Lennox-Gastaut syndrome with tonic seizures (HCC)     Lethargy     Liver enzyme elevation     Onychomycosis     Osteoporosis     Osteoporosis      Past Surgical History:   Procedure Laterality Date    ABDOMINAL SURGERY      CARDIAC PACEMAKER PLACEMENT      vns implant l chest    IR GASTROJEJUNOSTOMY (GJ) TUBE PLACEMENT  12/8/2023    IR GASTROSTOMY (G) TUBE CHECK/CHANGE/REINSERTION/UPSIZE  6/21/2024    IR GASTROSTOMY TUBE PLACEMENT  11/21/2019    IR THORACENTESIS  12/17/2018    JEJUNOSTOMY FEEDING TUBE      history of - most recently, PEG tube    PEG TUBE PLACEMENT      ID INSJ/RPLCMT CRANIAL NEUROSTIM PULSE GENERATOR Left 12/18/2019    Procedure: REPLACEMENT IMPLANTABLE PULSE GENERATOR FOR VAGAL NERVE STIMULATOR, LEFT CHEST;  Surgeon: Patrick Canales MD;  Location: [facility];  Service: Neurosurgery     Social History   Social History     Substance and Sexual Activity   Alcohol Use Not Currently     Social History     Substance and Sexual Activity   Drug Use Not Currently     E-Cigarette/Vaping    E-Cigarette Use Never User      E-Cigarette/Vaping Substances    Nicotine No     THC No     CBD No     Flavoring No     Other No     Unknown No      Social History     Tobacco Use   Smoking Status Never   Smokeless Tobacco Never     Family History: History reviewed. No pertinent family  history.  Meds/Allergies   all current active meds have been reviewed, current meds:   Current Facility-Administered Medications   Medication Dose Route Frequency    acetaminophen (TYLENOL) oral suspension 650 mg  650 mg Per G Tube Q4H PRN    bisacodyl (DULCOLAX) rectal suppository 10 mg  10 mg Rectal Daily    Brivaracetam (BRIVIACT) oral solution 50 mg  50 mg Per PEG Tube BID    cannabidiol (EPIDIOLEX) oral solution 500 mg  500 mg Per G Tube BID    cholecalciferol (VITAMIN D) oral liquid 400 Units  400 Units Per G Tube Daily    cloBAZam (ONFI) oral suspension 10 mg  10 mg Per G Tube BID    lacosamide (VIMPAT) oral solution 200 mg  200 mg Per G Tube Q12H NANDO    LORazepam (ATIVAN) injection 1 mg  1 mg Intravenous Once PRN    rufinamide (Banzel) suspension 1,600 mg  1,600 mg Per G Tube Q12H    topiramate (TOPAMAX) tablet 250 mg  250 mg Per G Tube Q12H NANDO   , and PTA meds:   Prior to Admission Medications   Prescriptions Last Dose Informant Patient Reported? Taking?   Probiotic Product (ALEXANDER-BID PROBIOTIC PO) Unknown Outside Facility (Specify) Yes No   Si tablet by Per G Tube route daily   VITAMIN D PO Unknown Outside Facility (Specify) Yes No   Sig: Take 1,000 mg by mouth in the morning Given in her G-tube   acetaminophen (TYLENOL) 325 mg tablet Unknown Outside Facility (Specify) Yes No   Sig: Take 650 mg by mouth every 4 (four) hours as needed for mild pain or fever   bisacodyl (DULCOLAX) 10 mg suppository Unknown  Yes No   Sig: Insert 10 mg into the rectum daily   brivaracetam (BRIVIACT) 50 MG TABS tablet Unknown  No No   Sig: Give 1 tablet via PEG tube q12 hours   cannabidiol (Epidiolex) 100 mg/ml SOLN Unknown  No No   Si mL (500 mg total) by Per G Tube route 2 (two) times a day   cloBAZam (ONFI) 10 MG tablet Unknown  No No   Sig: Give PEG-tube half a tab in the morning and one tab at bedtime   diazepam (VALIUM) 5 MG/ML solution Unknown  No No   Sig: If the patient has a seizure lasting more than 3 minutes  then give 1mL by PEG tube, may repeat 1mL if she continues to have seizure for more than 10 minutes.   lacosamide (VIMPAT) 200 mg tablet Unknown  No No   Sig: Give 1 tablet via PEG tube q12 hours   Patient not taking: Reported on 2024   rufinamide (BANZEL) 400 mg tablet Unknown  No No   Si tablets (1,600 mg total) by Per G Tube route every 12 (twelve) hours   topiramate (TOPAMAX) 50 MG tablet Unknown  No No   Si tablets (250 mg total) by Per G Tube route every 12 (twelve) hours      Facility-Administered Medications: None     Allergies   Allergen Reactions    Felbamate        Review of previous medical records was completed.       Review of Systems   Unable to perform ROS: Patient nonverbal       OBJECTIVE     Patient ID: Maira Bull is a 44 y.o. female.    Vitals:   Vitals:    24 1928 24 1937 24 2243   BP: 107/75  105/74   BP Location: Right arm     Pulse: 94  (!) 107   Resp: 18  18   Temp: 99.6 °F (37.6 °C)  97.8 °F (36.6 °C)   TempSrc: Axillary     SpO2: 99% 99% 95%   Weight: 44.4 kg (97 lb 14.4 oz)     Height: 5' (1.524 m)        Body mass index is 19.12 kg/m².     Intake/Output Summary (Last 24 hours) at 2024 0155  Last data filed at 2024 2256  Gross per 24 hour   Intake 266 ml   Output --   Net 266 ml       Temperature:   Temp (24hrs), Av.7 °F (37.1 °C), Min:97.8 °F (36.6 °C), Max:99.6 °F (37.6 °C)    Temperature: 97.8 °F (36.6 °C)    Invasive Devices:   Invasive Devices       Peripheral Intravenous Line  Duration             Peripheral IV 24 Distal;Dorsal (posterior);Right Forearm 1 day              Drain  Duration             Gastrostomy/Enterostomy Percutaneous Interventional Gastrostomy 16 Fr. LUQ 48 days                  Physical Exam  Constitutional:       Comments: Facial dysmorphia of intellectual disability.  Lying on bed, awake, does not track. Grinds teeth frequently.  Eyes:      Extraocular Movements: EOM normal.      Pupils: Pupils are equal,  round, and reactive to light.   Psychiatric:      Comments: Appears agitated.        Neurological Exam  Mental Status     Patient is non-verbal, unable to assess language, memory, attention etc .     Cranial Nerves  CN III, IV, VI: Extraocular movements intact bilaterally. Pupils equal round and reactive to light bilaterally.  CN VII: Muscles of facial expression are symmetric.     Motor  Normal tone.  Able to push me away with both arms.   Appears to be moving all 4 extremities symmetrically, against gravity.     Sensory  Responses to light touch in all 4 extremities     Reflexes  Not assessed.     Coordination  Patient unable to follow directions for testing .     Gait  Not assessed.      LABORATORY DATA     Labs: I have personally reviewed pertinent reports.    Results from last 7 days   Lab Units 08/06/24  1026   WBC Thousand/uL 7.33   HEMOGLOBIN g/dL 11.8   HEMATOCRIT % 35.8   PLATELETS Thousands/uL 204   SEGS PCT % 66   MONO PCT % 8   EOS PCT % 2      Results from last 7 days   Lab Units 08/07/24  1803 08/06/24  1026   POTASSIUM mmol/L 3.7 2.8*   CHLORIDE mmol/L 111* 110*   CO2 mmol/L 20* 22   BUN mg/dL 11 17   CREATININE mg/dL 0.42* 0.43*   CALCIUM mg/dL 8.6 9.0   ALK PHOS U/L  --  79   ALT U/L  --  28   AST U/L  --  19     Results from last 7 days   Lab Units 08/06/24  1026   MAGNESIUM mg/dL 1.9              Results from last 7 days   Lab Units 08/06/24  1026   LACTIC ACID mmol/L 1.1           IMAGING & DIAGNOSTIC TESTING     Radiology Results:   No orders to display       Other Diagnostic Testing: I have personally reviewed pertinent reports.      ACTIVE MEDICATIONS     Current Facility-Administered Medications   Medication Dose Route Frequency    acetaminophen (TYLENOL) oral suspension 650 mg  650 mg Per G Tube Q4H PRN    bisacodyl (DULCOLAX) rectal suppository 10 mg  10 mg Rectal Daily    Brivaracetam (BRIVIACT) oral solution 50 mg  50 mg Per PEG Tube BID    cannabidiol (EPIDIOLEX) oral solution 500 mg   500 mg Per G Tube BID    cholecalciferol (VITAMIN D) oral liquid 400 Units  400 Units Per G Tube Daily    cloBAZam (ONFI) oral suspension 10 mg  10 mg Per G Tube BID    lacosamide (VIMPAT) oral solution 200 mg  200 mg Per G Tube Q12H [facility]    LORazepam (ATIVAN) injection 1 mg  1 mg Intravenous Once PRN    rufinamide (Banzel) suspension 1,600 mg  1,600 mg Per G Tube Q12H    topiramate (TOPAMAX) tablet 250 mg  250 mg Per G Tube Q12H [facility]       Prior to Admission medications    Medication Sig Start Date End Date Taking? Authorizing Provider   acetaminophen (TYLENOL) 325 mg tablet Take 650 mg by mouth every 4 (four) hours as needed for mild pain or fever    Historical Provider, MD   bisacodyl (DULCOLAX) 10 mg suppository Insert 10 mg into the rectum daily    Historical Provider, MD   brivaracetam (BRIVIACT) 50 MG TABS tablet Give 1 tablet via PEG tube q12 hours 1/25/24   Quynh España MD   cannabidiol (Epidiolex) 100 mg/ml SOLN 5 mL (500 mg total) by Per G Tube route 2 (two) times a day 2/8/24   Quynh España MD   cloBAZam (ONFI) 10 MG tablet Give PEG-tube half a tab in the morning and one tab at bedtime 3/22/24   Quynh España MD   diazepam (VALIUM) 5 MG/ML solution If the patient has a seizure lasting more than 3 minutes then give 1mL by PEG tube, may repeat 1mL if she continues to have seizure for more than 10 minutes. 1/25/24   Quynh España MD   lacosamide (VIMPAT) 200 mg tablet Give 1 tablet via PEG tube q12 hours  Patient not taking: Reported on 7/29/2024 1/25/24   Quynh España MD   Probiotic Product (ALEXANDER-BID PROBIOTIC PO) 1 tablet by Per G Tube route daily    Patient not taking: Reported on 7/29/2024    Historical Provider, MD   rufinamide (BANZEL) 400 mg tablet 4 tablets (1,600 mg total) by Per G Tube route every 12 (twelve) hours 1/25/24   Quynh España MD   topiramate (TOPAMAX) 50 MG tablet 5 tablets (250 mg total) by Per G Tube route every 12 (twelve) hours 1/25/24   Quynh España MD   VITAMIN D  PO Take 1,000 mg by mouth in the morning Given in her G-tube    Historical Provider, MD       CODE STATUS & ADVANCED DIRECTIVES     Code Status: Level 1 - Full Code  Advance Directive and Living Will: Yes    Power of :    POLST:        VTE Pharmacologic Prophylaxis: Defer to primary team  VTE Mechanical Prophylaxis: sequential compression device    ======    I have discussed the patient's history, physical exam findings, assessment, and plan in detail with attending, Dr. Guerra    Thank you for allowing me to participate in the care of your patient, Maira Bull.    Pooja Peña, DO  [facility] Neurology Residency, PGY-3

## 2024-08-09 NOTE — CONSULTS
NEUROLOGY RESIDENCY CONSULT NOTE     Name: Maira Bull   Age & Sex: 44 y.o. female   MRN: [id number]  Unit/Bed#: [facility] 607-01   Encounter: [id number]  Length of Stay: 1    Next OP neurology appointment with Dr. España on 10/25/24.    Pending for discharge: VNS battery replacement per nsx    ASSESSMENT & PLAN     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HCC)  Assessment & Plan  Maira is a 45YO F with history of intractable Lennox-Gaustalt syndrome who presents for VNS battery replacement.    Dr. Guerra interrogated VNS and saw that it was functioning at 8-15%.  VNS will need generator replacement and then device will need reinterrogation afterwards.      Plan:  Continue anti-seizure regiment of:  Briviact 50mg BID  Epidiolex 500mg BID  Clobazam 10mg BID (at home dosage: 5mg in AM, 10mg QHS)  Vimpat 200mg BID  Rufinamide 1600mg BID  Topamax 250mg BID  VNS battery replacement per neurosurgery team appreciated  Interrogate VNS after battery replacement  OP epileptologist follow up appointment on 10/25/24.        SUBJECTIVE     Reason for Consult / Principal Problem: LGS  Hx and PE limited by: nonverbal    HPI: Maira Bull is a 45YO F with history of intractable Lennox-Gastaut syndrome, frequent tonic seizures (especially during sleep), and intermittent witnessed tonic seizures who originally presented to [facility] on 8/6 with c/f 10-15 seizures that day requiring 10mg rectal valium, now transferred to [facility] on 8/8 for neurosurgery evaluation to replace VNS battery. No further seizures since 8/6. LOV with OP neurology on 7/18/24 with Li Vance PA-C, at which time VNS battery life was 8-15%. Met with OP neurosurgery on 7/29 with attempts to contact family to obtain consent for OP surgical procedure to replace VNS battery but unable to get in touch with family.    VNS was attempted to be interrogated on 8/8PM, but unable to connect to device so initially thought possible depleted battery.    Current Anti-seizure regiment:  Briviact  50mg BID  Epidiolex 500mg BID  Clobazam 10mg BID (at home dosage: 5mg in AM, 10mg QHS)  Vimpat 200mg BID  Rufinamide 1600mg BID  Topamax 250mg BID  VNS - end of life battery. Pending battery replacement with nsx.    Seizure semiology:  She was described to have drop attacks or spells with grunting sounds and falling to the floor.   Her nurse commented on episodes of spasms or myoclonic jerking of the right arm.   Generalized convulsions  Tonic seizures of eyes rolling back (mostly during sleep)    Special Features  Status epilepticus: yes  Self Injury Seizures: No  Precipitating Factors: menstrual cycle??     Epilepsy Risk Factors:  Abnormal pregnancy: unknown  Abnormal birth/: unknown  Abnormal Development: unknown developmental history  Febrile seizures, simple: unknown  Febrile seizures, complex: unknown  CNS infection: No  Mental retardation: Yes  Cerebral palsy: Yes  Head injury (moderate/severe): possibly anoxic brain injury  CNS neoplasm: No  CNS malformation: No  Neurosurgical procedure: No  Stroke: No  Alcohol abuse: No  Drug abuse: No  Family history Sz/epilepsy: unknown     Prior AEDs:  Rufinamide  Clobazam (excessive sedation)  Clonazepam  Levetiracetam (unclear if beneficial)  Lamotrigine (unclear if beneficial)  Topiramate (missed doses caused breakthrough convulsive type of seizures)  Valproate (elevated ammonia levels)  Fycompa (excess sedation)  Felbamate (listed as a drug allergy)  Phenobarbital (contributing to sedation)  Epidiolex (seems to help the most)  Brivaracetam  Lacosamide     Prior workup:    Imaging:  CT head 2013, 2018  No acute intracranial pathology     3/20/2019  MRI brain w/wo contrast  Normal MR brain study  Symmetric hippocampal formations     EEGs:  Continuous video EEG monitoring 10/13 - 10/15/2017  Frequent generalized spike/polyspike-slow wave complexes during sleep  At times there are independent right more than left midtemporal spikes and bitemporal  spikes     Innumerable generalized tonic seizures during sleep, generalized 1 Hz period discharges, that would become continuous for 30 seconds or generalized rhythmic alpha-beta discharges, associated with patient becoming rigid, tonic posturing with arms elevated and tense with subtle jerking of the upper body, eyes opening with upward deviation.  Ictal patterns:  1 - Seconds of diffuse electrodecrement then paroxysmal fast activity followed by 2Hz spike wave discharges (clinically accompanied by posturing of the arms, then clonic jerking)     Continuous video EEG monitoring 10/18 - 10/25/2017  Diffuse background slowing with bursts of arrhythmic generalized spike wave discharges, with shifting lateralization, and independent left and right temporal spikes  Mild eyelid myoclonia associated with brief bursts of 2-2.5 Hz generalized discharges  Tonic seizures with eelctrodecrement  There were innumerable tonic seizures; however by 10/25/2017 the frequency of these seizures did decrease in frequency.     Continuous video EEG monitoring 12/1-12/5/2017  There are innumerable tonic seizures that are generally less than one minute in duration (30-40 seconds), these consists of subtle eye opening and tonic stiffening of arms, if longer then whole body stiffening with clonic jerking movements. These almost always occur exclusively out of sleep. These consist of 2-2.5 Hz generalized spike-slow wave complexes with generalized attenuation of activity (desynchronization) or paroxysmal fast activity. Per 24 hours count of seizures could be .     12/19/2018 routine study  Frequent recurrent tonic seizures associated with paroxysmal generalized fast activity then generalized rhythmic discharges associated with eyes upward deviation, and myoclonic/clonic jerking of the upper body, excess beta activity.     6/28-7/3/2019 continuous video EEG monitoring  Frequent clusters of tonic seizures (body rigidity, head flexions, eyes  go up with dysconjugate gaze, and clonic activity of the arms for a few seconds), these are associated with GPFA and rhythmic spike-slow waves.  There are also bilateral temporal focal epileptiform discharges    Inpatient consult to Neurology  Consult performed by: Pooja Peña DO  Consult ordered by: Devora Mendez MD        Historical Information   Past Medical History:   Diagnosis Date    ADHD     Anoxic brain damage (HCC)     Autistic disorder     Breakthrough seizure (HCC) 8/1/2019    Dysphagia     Dysphagia, oropharyngeal phase     Gastrostomy tube dependent (HCC)     14 Fr as of 05/19/2020    Hyperammonemia (HCC)     Hyperkeratosis     Hypotension     Intellectual disability     Lennox-Gastaut syndrome with tonic seizures (HCC)     Lethargy     Liver enzyme elevation     Onychomycosis     Osteoporosis     Osteoporosis      Past Surgical History:   Procedure Laterality Date    ABDOMINAL SURGERY      CARDIAC PACEMAKER PLACEMENT      vns implant l chest    IR GASTROJEJUNOSTOMY (GJ) TUBE PLACEMENT  12/8/2023    IR GASTROSTOMY (G) TUBE CHECK/CHANGE/REINSERTION/UPSIZE  6/21/2024    IR GASTROSTOMY TUBE PLACEMENT  11/21/2019    IR THORACENTESIS  12/17/2018    JEJUNOSTOMY FEEDING TUBE      history of - most recently, PEG tube    PEG TUBE PLACEMENT      IN INSJ/RPLCMT CRANIAL NEUROSTIM PULSE GENERATOR Left 12/18/2019    Procedure: REPLACEMENT IMPLANTABLE PULSE GENERATOR FOR VAGAL NERVE STIMULATOR, LEFT CHEST;  Surgeon: Patrick Canales MD;  Location: [facility];  Service: Neurosurgery     Social History   Social History     Substance and Sexual Activity   Alcohol Use Not Currently     Social History     Substance and Sexual Activity   Drug Use Not Currently     E-Cigarette/Vaping    E-Cigarette Use Never User      E-Cigarette/Vaping Substances    Nicotine No     THC No     CBD No     Flavoring No     Other No     Unknown No      Social History     Tobacco Use   Smoking Status Never   Smokeless Tobacco Never     Family  History: History reviewed. No pertinent family history.  Meds/Allergies   all current active meds have been reviewed, current meds:   Current Facility-Administered Medications   Medication Dose Route Frequency    acetaminophen (TYLENOL) oral suspension 650 mg  650 mg Per G Tube Q4H PRN    bisacodyl (DULCOLAX) rectal suppository 10 mg  10 mg Rectal Daily    Brivaracetam (BRIVIACT) oral solution 50 mg  50 mg Per PEG Tube BID    cannabidiol (EPIDIOLEX) oral solution 500 mg  500 mg Per G Tube BID    cholecalciferol (VITAMIN D) oral liquid 400 Units  400 Units Per G Tube Daily    cloBAZam (ONFI) oral suspension 10 mg  10 mg Per G Tube BID    lacosamide (VIMPAT) oral solution 200 mg  200 mg Per G Tube Q12H NANDO    LORazepam (ATIVAN) injection 1 mg  1 mg Intravenous Once PRN    rufinamide (Banzel) suspension 1,600 mg  1,600 mg Per G Tube Q12H    topiramate (TOPAMAX) tablet 250 mg  250 mg Per G Tube Q12H NANDO   , and PTA meds:   Prior to Admission Medications   Prescriptions Last Dose Informant Patient Reported? Taking?   Probiotic Product (ALEXANDER-BID PROBIOTIC PO) Unknown Outside Facility (Specify) Yes No   Si tablet by Per G Tube route daily   VITAMIN D PO Unknown Outside Facility (Specify) Yes No   Sig: Take 1,000 mg by mouth in the morning Given in her G-tube   acetaminophen (TYLENOL) 325 mg tablet Unknown Outside Facility (Specify) Yes No   Sig: Take 650 mg by mouth every 4 (four) hours as needed for mild pain or fever   bisacodyl (DULCOLAX) 10 mg suppository Unknown  Yes No   Sig: Insert 10 mg into the rectum daily   brivaracetam (BRIVIACT) 50 MG TABS tablet Unknown  No No   Sig: Give 1 tablet via PEG tube q12 hours   cannabidiol (Epidiolex) 100 mg/ml SOLN Unknown  No No   Si mL (500 mg total) by Per G Tube route 2 (two) times a day   cloBAZam (ONFI) 10 MG tablet Unknown  No No   Sig: Give PEG-tube half a tab in the morning and one tab at bedtime   diazepam (VALIUM) 5 MG/ML solution Unknown  No No   Sig: If the  patient has a seizure lasting more than 3 minutes then give 1mL by PEG tube, may repeat 1mL if she continues to have seizure for more than 10 minutes.   lacosamide (VIMPAT) 200 mg tablet Unknown  No No   Sig: Give 1 tablet via PEG tube q12 hours   Patient not taking: Reported on 2024   rufinamide (BANZEL) 400 mg tablet Unknown  No No   Si tablets (1,600 mg total) by Per G Tube route every 12 (twelve) hours   topiramate (TOPAMAX) 50 MG tablet Unknown  No No   Si tablets (250 mg total) by Per G Tube route every 12 (twelve) hours      Facility-Administered Medications: None     Allergies   Allergen Reactions    Felbamate        Review of previous medical records was completed.       Review of Systems   Unable to perform ROS: Patient nonverbal       OBJECTIVE     Patient ID: Maira Bull is a 44 y.o. female.    Vitals:   Vitals:    24 1937 24 2243 24 0714 24 0900   BP:  105/74 93/63 94/81   BP Location:    Left arm   Pulse:  (!) 107 80    Resp:  18 16    Temp:  97.8 °F (36.6 °C) 97.9 °F (36.6 °C)    TempSrc:       SpO2: 99% 95% 95%    Weight:       Height:          Body mass index is 19.12 kg/m².     Intake/Output Summary (Last 24 hours) at 2024 1132  Last data filed at 2024 1101  Gross per 24 hour   Intake 804 ml   Output 1 ml   Net 803 ml       Temperature:   Temp (24hrs), Av.4 °F (36.9 °C), Min:97.8 °F (36.6 °C), Max:99.6 °F (37.6 °C)    Temperature: 97.9 °F (36.6 °C)    Invasive Devices:   Invasive Devices       Peripheral Intravenous Line  Duration             Peripheral IV 24 Distal;Dorsal (posterior);Right Forearm 2 days              Drain  Duration             Gastrostomy/Enterostomy Percutaneous Interventional Gastrostomy 16 Fr. LUQ 49 days                  Physical Exam  Constitutional:       Comments: Facial dysmorphia of intellectual disability.  Lying on bed, awake, does not track. Grinds teeth frequently.  Eyes:      Extraocular Movements: EOM  normal.      Pupils: Pupils are equal, round, and reactive to light.   Psychiatric:      Comments: Appears agitated.        Neurological Exam  Mental Status     Patient is non-verbal, unable to assess language, memory, attention etc .     Cranial Nerves  CN III, IV, VI: Extraocular movements intact bilaterally. Pupils equal round and reactive to light bilaterally.  CN VII: Muscles of facial expression are symmetric.     Motor  Normal tone.  Able to push me away with both arms.   Appears to be moving all 4 extremities symmetrically, against gravity.     Sensory  Responses to light touch in all 4 extremities     Reflexes  Not assessed.     Coordination  Patient unable to follow directions for testing .     Gait  Not assessed.      LABORATORY DATA     Labs: I have personally reviewed pertinent reports.    Results from last 7 days   Lab Units 08/09/24  0448 08/06/24  1026   WBC Thousand/uL 7.59 7.33   HEMOGLOBIN g/dL 12.5 11.8   HEMATOCRIT % 37.9 35.8   PLATELETS Thousands/uL 234 204   SEGS PCT %  --  66   MONO PCT %  --  8   EOS PCT %  --  2      Results from last 7 days   Lab Units 08/09/24  0448 08/07/24  1803 08/06/24  1026   POTASSIUM mmol/L 3.4* 3.7 2.8*   CHLORIDE mmol/L 110* 111* 110*   CO2 mmol/L 19* 20* 22   BUN mg/dL 12 11 17   CREATININE mg/dL 0.43* 0.42* 0.43*   CALCIUM mg/dL 8.8 8.6 9.0   ALK PHOS U/L 86  --  79   ALT U/L 33  --  28   AST U/L 23  --  19     Results from last 7 days   Lab Units 08/06/24  1026   MAGNESIUM mg/dL 1.9              Results from last 7 days   Lab Units 08/06/24  1026   LACTIC ACID mmol/L 1.1           IMAGING & DIAGNOSTIC TESTING     Radiology Results:   No orders to display       Other Diagnostic Testing: I have personally reviewed pertinent reports.      ACTIVE MEDICATIONS     Current Facility-Administered Medications   Medication Dose Route Frequency    acetaminophen (TYLENOL) oral suspension 650 mg  650 mg Per G Tube Q4H PRN    bisacodyl (DULCOLAX) rectal suppository 10 mg   10 mg Rectal Daily    Brivaracetam (BRIVIACT) oral solution 50 mg  50 mg Per PEG Tube BID    cannabidiol (EPIDIOLEX) oral solution 500 mg  500 mg Per G Tube BID    cholecalciferol (VITAMIN D) oral liquid 400 Units  400 Units Per G Tube Daily    cloBAZam (ONFI) oral suspension 10 mg  10 mg Per G Tube BID    lacosamide (VIMPAT) oral solution 200 mg  200 mg Per G Tube Q12H NANDO    LORazepam (ATIVAN) injection 1 mg  1 mg Intravenous Once PRN    rufinamide (Banzel) suspension 1,600 mg  1,600 mg Per G Tube Q12H    topiramate (TOPAMAX) tablet 250 mg  250 mg Per G Tube Q12H NANDO       Prior to Admission medications    Medication Sig Start Date End Date Taking? Authorizing Provider   acetaminophen (TYLENOL) 325 mg tablet Take 650 mg by mouth every 4 (four) hours as needed for mild pain or fever    Historical Provider, MD   bisacodyl (DULCOLAX) 10 mg suppository Insert 10 mg into the rectum daily    Historical Provider, MD   brivaracetam (BRIVIACT) 50 MG TABS tablet Give 1 tablet via PEG tube q12 hours 1/25/24   Quynh España MD   cannabidiol (Epidiolex) 100 mg/ml SOLN 5 mL (500 mg total) by Per G Tube route 2 (two) times a day 2/8/24   Quynh España MD   cloBAZam (ONFI) 10 MG tablet Give PEG-tube half a tab in the morning and one tab at bedtime 3/22/24   Quynh España MD   diazepam (VALIUM) 5 MG/ML solution If the patient has a seizure lasting more than 3 minutes then give 1mL by PEG tube, may repeat 1mL if she continues to have seizure for more than 10 minutes. 1/25/24   Quynh España MD   lacosamide (VIMPAT) 200 mg tablet Give 1 tablet via PEG tube q12 hours  Patient not taking: Reported on 7/29/2024 1/25/24   Quynh España MD   Probiotic Product (ALEXANDER-BID PROBIOTIC PO) 1 tablet by Per G Tube route daily    Patient not taking: Reported on 7/29/2024    Historical Provider, MD   rufinamide (BANZEL) 400 mg tablet 4 tablets (1,600 mg total) by Per G Tube route every 12 (twelve) hours 1/25/24   Quynh España MD    topiramate (TOPAMAX) 50 MG tablet 5 tablets (250 mg total) by Per G Tube route every 12 (twelve) hours 1/25/24   Quynh España MD   VITAMIN D PO Take 1,000 mg by mouth in the morning Given in her G-tube    Historical Provider, MD       CODE STATUS & ADVANCED DIRECTIVES     Code Status: Level 1 - Full Code  Advance Directive and Living Will: Yes    Power of :    POLST:        VTE Pharmacologic Prophylaxis: Defer to primary team  VTE Mechanical Prophylaxis: sequential compression device    ======    I have discussed the patient's history, physical exam findings, assessment, and plan in detail with attending, Dr. Benoit    Thank you for allowing me to participate in the care of your patient, Maira PHYLLIS Bull.    Morgan Urrutia DO  [facility], PGY-2

## 2024-08-09 NOTE — ASSESSMENT & PLAN NOTE
Maira is a 45YO F with history of intractable Lennox-Gaustalt syndrome who presents for VNS battery replacement.    Dr. Guerra interrogated VNS and saw that it was functioning at 8-15%.  VNS will need generator replacement and then device will need reinterrogation afterwards.      Plan:  Continue anti-seizure regiment of:  Briviact 50mg BID  Epidiolex 500mg BID  Clobazam 10mg BID (at home dosage: 5mg in AM, 10mg QHS)  Vimpat 200mg BID  Rufinamide 1600mg BID  Topamax 250mg BID  VNS battery replacement per neurosurgery team appreciated  Interrogate VNS after battery replacement  OP epileptologist follow up appointment on 10/25/24.

## 2024-08-10 LAB
ANION GAP SERPL CALCULATED.3IONS-SCNC: 9 MMOL/L (ref 4–13)
BUN SERPL-MCNC: 15 MG/DL (ref 5–25)
CALCIUM SERPL-MCNC: 9.4 MG/DL (ref 8.4–10.2)
CHLORIDE SERPL-SCNC: 109 MMOL/L (ref 96–108)
CO2 SERPL-SCNC: 21 MMOL/L (ref 21–32)
CREAT SERPL-MCNC: 0.49 MG/DL (ref 0.6–1.3)
GFR SERPL CREATININE-BSD FRML MDRD: 119 ML/MIN/1.73SQ M
GLUCOSE SERPL-MCNC: 90 MG/DL (ref 65–140)
MRSA NOSE QL CULT: NORMAL
POTASSIUM SERPL-SCNC: 3.5 MMOL/L (ref 3.5–5.3)
SODIUM SERPL-SCNC: 139 MMOL/L (ref 135–147)

## 2024-08-10 PROCEDURE — 80048 BASIC METABOLIC PNL TOTAL CA: CPT | Performed by: PHYSICIAN ASSISTANT

## 2024-08-10 PROCEDURE — 99232 SBSQ HOSP IP/OBS MODERATE 35: CPT | Performed by: NURSE PRACTITIONER

## 2024-08-10 RX ADMIN — RUFINAMIDE 1600 MG: 200 TABLET, FILM COATED ORAL at 09:49

## 2024-08-10 RX ADMIN — Medication 400 UNITS: at 21:43

## 2024-08-10 RX ADMIN — LACOSAMIDE 200 MG: 10 SOLUTION ORAL at 09:46

## 2024-08-10 RX ADMIN — RUFINAMIDE 1600 MG: 200 TABLET, FILM COATED ORAL at 21:43

## 2024-08-10 RX ADMIN — CLOBAZAM 10 MG: 2.5 SUSPENSION ORAL at 21:48

## 2024-08-10 RX ADMIN — TOPIRAMATE 250 MG: 100 TABLET, FILM COATED ORAL at 21:44

## 2024-08-10 RX ADMIN — BRIVARACETAM 50 MG: 10 SOLUTION ORAL at 21:48

## 2024-08-10 RX ADMIN — Medication 500 MG: at 22:39

## 2024-08-10 RX ADMIN — CLOBAZAM 10 MG: 2.5 SUSPENSION ORAL at 09:46

## 2024-08-10 RX ADMIN — BISACODYL 10 MG: 10 SUPPOSITORY RECTAL at 09:46

## 2024-08-10 RX ADMIN — Medication 500 MG: at 09:46

## 2024-08-10 RX ADMIN — TOPIRAMATE 250 MG: 100 TABLET, FILM COATED ORAL at 09:51

## 2024-08-10 RX ADMIN — BRIVARACETAM 50 MG: 10 SOLUTION ORAL at 09:46

## 2024-08-10 RX ADMIN — LACOSAMIDE 200 MG: 10 SOLUTION ORAL at 21:48

## 2024-08-10 NOTE — ASSESSMENT & PLAN NOTE
Currently a resident of a skilled nursing facility.    Nonverbal at baseline  Has PEG tube in place, continue with tube feeds and medications  Discussed with neurosurgery no plan for procedure today 8/10/2024.  Place patient n.p.o. at midnight  Called facility to ask for patient's tube feed orders.  Ordered Jevity 1.2 at 52 mL an hour tonight 8/10/2024 will run tube feeds until midnight then stop and make patient n.p.o. after midnight for procedure tomorrow, Sunday.  Postprocedure Sunday, 8/11/2024 will continue tube feeds Jevity 1.2 at 52 mL an hour from noon until 8 AM daily patient also has water flushes 60 mL before and after medications

## 2024-08-10 NOTE — PROGRESS NOTES
[facility]  Progress Note  Name: Maira Bull I  MRN: [id number]  [facility] I Date of Admission: 8/8/2024   Date of Service: 8/10/2024 I Hospital Day: 2    Assessment & Plan   * Intractable epilepsy with status epilepticus (HCC)  Assessment & Plan  Patient with Lennox Gastuat syndrome and followed by neurology as outpatient  Patient has VNS.  Was recently seen in neurosurgery office for VNS battery exchange but unable to obtain consent.  Patient was sent to the emergency room with persistent seizure from the facility.  Transferred to [facility] for evaluation by neurosurgery and for VNS battery exchange  Neurology following,  Continue anti seizure regimen of Briviact 50 mg BID, Epidiolex 500 mg BID, clobazam 10 mg BID, Vimpat 200 mg BID, Rufinamide 1600 mg BID and Topamax 250 mg BID   Plan for VNS battery exchange tomorrow 8/11 with neurosurgery     Intellectual disability with epilepsy (HCC)  Assessment & Plan  Currently a resident of a skilled nursing facility.    Nonverbal at baseline  Has PEG tube in place, continue with tube feeds and medications  Discussed with neurosurgery no plan for procedure today 8/10/2024.  Place patient n.p.o. at midnight  Called facility to ask for patient's tube feed orders.  Ordered Jevity 1.2 at 52 mL an hour tonight 8/10/2024 will run tube feeds until midnight then stop and make patient n.p.o. after midnight for procedure tomorrow, Sunday.  Postprocedure Sunday, 8/11/2024 will continue tube feeds Jevity 1.2 at 52 mL an hour from noon until 8 AM daily patient also has water flushes 60 mL before and after medications    Cerebral anoxic injury (HCC)  Assessment & Plan  History of cerebral anoxic injury   Patient with significant debility, nursing home resident  With resultant intractable seizure  Continue with supportive care    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HCC)  Assessment & Plan  Plan for VNS battery replacement with  neurosurgery tomorrow  Last battery change was in 2019, battery now noted to be near end of life   Additional plan as above              VTE Pharmacologic Prophylaxis: VTE Score: 1 High Risk (Score >/= 5) - Pharmacological DVT Prophylaxis Contraindicated. Sequential Compression Devices Ordered.    Mobility:   Basic Mobility Inpatient Raw Score: 8  -[facility] Goal: 3: Sit at edge of bed  JH-HLM Achieved: 2: Bed activities/Dependent transfer  JH-HLM Goal NOT achieved. Continue with multidisciplinary rounding and encourage appropriate mobility to improve upon -HLM goals.    Patient Centered Rounds: I performed bedside rounds with nursing staff today.   Discussions with Specialists or Other Care Team Provider: nursing     Education and Discussions with Family / Patient:  unable to reach did call facility to talk with nursing staff about the patient .     Total Time Spent on Date of Encounter in care of patient: 35 mins. This time was spent on one or more of the following: performing physical exam; counseling and coordination of care; obtaining or reviewing history; documenting in the medical record; reviewing/ordering tests, medications or procedures; communicating with other healthcare professionals and discussing with patient's family/caregivers.    Current Length of Stay: 2 day(s)  Current Patient Status: Inpatient   Certification Statement: The patient will continue to require additional inpatient hospital stay due to pending procedure tomorrow.  Discharge Plan: Anticipate discharge in 48-72 hrs to group home.    Code Status: Level 1 - Full Code    Subjective:   Patient laying on right side fetal position.  Nonverbal appears comfortable becomes agitated when assessing and listening to heart.      Objective:     Vitals:   Temp (24hrs), Av.4 °F (36.3 °C), Min:96.3 °F (35.7 °C), Max:97.9 °F (36.6 °C)    Temp:  [96.3 °F (35.7 °C)-97.9 °F (36.6 °C)] 97.9 °F (36.6 °C)  HR:  [79-83] 79  Resp:  [18-19] 18  BP:  (92-93)/(59-67) 92/67  SpO2:  [95 %-97 %] 95 %  Body mass index is 19.12 kg/m².     Input and Output Summary (last 24 hours):     Intake/Output Summary (Last 24 hours) at 8/10/2024 1801  Last data filed at 8/9/2024 2207  Gross per 24 hour   Intake 400 ml   Output --   Net 400 ml       Physical Exam:   Physical Exam  Constitutional:       General: She is not in acute distress.     Appearance: She is not ill-appearing or diaphoretic.      Comments: cachectic   HENT:      Head: Normocephalic and atraumatic.   Eyes:      General:         Right eye: No discharge.         Left eye: No discharge.   Cardiovascular:      Rate and Rhythm: Tachycardia present.      Heart sounds: No murmur heard.     No friction rub. No gallop.   Pulmonary:      Effort: No respiratory distress.      Breath sounds: No stridor. No wheezing, rhonchi or rales.   Chest:      Chest wall: No tenderness.   Abdominal:      General: There is no distension.      Palpations: There is no mass.      Tenderness: There is no abdominal tenderness. There is no guarding or rebound.      Hernia: No hernia is present.   Musculoskeletal:         General: No swelling, tenderness, deformity or signs of injury.      Right lower leg: No edema.      Left lower leg: No edema.   Skin:     Coloration: Skin is not jaundiced or pale.      Findings: No bruising, erythema, lesion or rash.   Neurological:      Mental Status: She is alert. Mental status is at baseline.      Comments: Nonverbal    Psychiatric:      Comments: Nonverbal defensive fetal position           Additional Data:     Labs:  Results from last 7 days   Lab Units 08/09/24  0448 08/06/24  1026   WBC Thousand/uL 7.59 7.33   HEMOGLOBIN g/dL 12.5 11.8   HEMATOCRIT % 37.9 35.8   PLATELETS Thousands/uL 234 204   SEGS PCT %  --  66   LYMPHO PCT %  --  23   MONO PCT %  --  8   EOS PCT %  --  2     Results from last 7 days   Lab Units 08/10/24  0459 08/09/24  0448   SODIUM mmol/L 139 138   POTASSIUM mmol/L 3.5 3.4*    CHLORIDE mmol/L 109* 110*   CO2 mmol/L 21 19*   BUN mg/dL 15 12   CREATININE mg/dL 0.49* 0.43*   ANION GAP mmol/L 9 9   CALCIUM mg/dL 9.4 8.8   ALBUMIN g/dL  --  3.9   TOTAL BILIRUBIN mg/dL  --  0.30   ALK PHOS U/L  --  86   ALT U/L  --  33   AST U/L  --  23   GLUCOSE RANDOM mg/dL 90 139                 Results from last 7 days   Lab Units 08/06/24  1026   LACTIC ACID mmol/L 1.1       Lines/Drains:  Invasive Devices       Peripheral Intravenous Line  Duration             Peripheral IV 08/07/24 Distal;Dorsal (posterior);Right Forearm 3 days              Drain  Duration             Gastrostomy/Enterostomy Percutaneous Interventional Gastrostomy 16 Fr. LUQ 50 days                          Imaging: Reviewed radiology reports from this admission including: CT head    Recent Cultures (last 7 days):         Last 24 Hours Medication List:   Current Facility-Administered Medications   Medication Dose Route Frequency Provider Last Rate    acetaminophen  650 mg Per G Tube Q4H PRN Devora Mendez MD      bisacodyl  10 mg Rectal Daily Devora Mendez MD      Brivaracetam  50 mg Per PEG Tube BID Devora Mendez MD      cannabidiol  500 mg Per G Tube BID Devora Mendez MD      ceFAZolin (ANCEF) 1,000 mg in sodium chloride 0.9 % 1,000 mL irrigation bottle   Irrigation Once Jeffrey Farias MD      cholecalciferol  400 Units Per G Tube Daily Devora Mendez MD      cloBAZam  10 mg Per G Tube BID Devora Mendez MD      lacosamide  200 mg Per G Tube Q12H [facility] Devora Mendez MD      LORazepam  1 mg Intravenous Once PRN Pooja Peña DO      rufinamide  1,600 mg Per G Tube Q12H Devora Mendez MD      topiramate  250 mg Per G Tube Q12H [facility] Devora Mendez MD          Today, Patient Was Seen By: KATHY Gibson    **Please Note: This note may have been constructed using a voice recognition system.**

## 2024-08-10 NOTE — ASSESSMENT & PLAN NOTE
Patient with Lennox Gastuat syndrome and followed by neurology as outpatient  Patient has VNS.  Was recently seen in neurosurgery office for VNS battery exchange but unable to obtain consent.  Patient was sent to the emergency room with persistent seizure from the facility.  Transferred to [facility] for evaluation by neurosurgery and for VNS battery exchange  Neurology following,  Continue anti seizure regimen of Briviact 50 mg BID, Epidiolex 500 mg BID, clobazam 10 mg BID, Vimpat 200 mg BID, Rufinamide 1600 mg BID and Topamax 250 mg BID   Plan for VNS battery exchange tomorrow 8/11 with neurosurgery

## 2024-08-10 NOTE — PLAN OF CARE
Problem: PAIN - ADULT  Goal: Verbalizes/displays adequate comfort level or baseline comfort level  Description: Interventions:  - Encourage patient to monitor pain and request assistance  - Assess pain using appropriate pain scale  - Administer analgesics based on type and severity of pain and evaluate response  - Implement non-pharmacological measures as appropriate and evaluate response  - Consider cultural and social influences on pain and pain management  - Notify physician/advanced practitioner if interventions unsuccessful or patient reports new pain  Outcome: Progressing     Problem: INFECTION - ADULT  Goal: Absence or prevention of progression during hospitalization  Description: INTERVENTIONS:  - Assess and monitor for signs and symptoms of infection  - Monitor lab/diagnostic results  - Monitor all insertion sites, i.e. indwelling lines, tubes, and drains  - Monitor endotracheal if appropriate and nasal secretions for changes in amount and color  - Ira appropriate cooling/warming therapies per order  - Administer medications as ordered  - Instruct and encourage patient and family to use good hand hygiene technique  - Identify and instruct in appropriate isolation precautions for identified infection/condition  Outcome: Progressing  Goal: Absence of fever/infection during neutropenic period  Description: INTERVENTIONS:  - Monitor WBC    Outcome: Progressing     Problem: SAFETY ADULT  Goal: Patient will remain free of falls  Description: INTERVENTIONS:  - Educate patient/family on patient safety including physical limitations  - Instruct patient to call for assistance with activity   - Consult OT/PT to assist with strengthening/mobility   - Keep Call bell within reach  - Keep bed low and locked with side rails adjusted as appropriate  - Keep care items and personal belongings within reach  - Initiate and maintain comfort rounds  - Make Fall Risk Sign visible to staff  - Offer Toileting every 2 Hours,  in advance of need  - Initiate/Maintain bed alarm  - Obtain necessary fall risk management equipment:   - Apply yellow socks and bracelet for high fall risk patients  - Consider moving patient to room near nurses station  Outcome: Progressing  Goal: Maintain or return to baseline ADL function  Description: INTERVENTIONS:  -  Assess patient's ability to carry out ADLs; assess patient's baseline for ADL function and identify physical deficits which impact ability to perform ADLs (bathing, care of mouth/teeth, toileting, grooming, dressing, etc.)  - Assess/evaluate cause of self-care deficits   - Assess range of motion  - Assess patient's mobility; develop plan if impaired  - Assess patient's need for assistive devices and provide as appropriate  - Encourage maximum independence but intervene and supervise when necessary  - Involve family in performance of ADLs  - Assess for home care needs following discharge   - Consider OT consult to assist with ADL evaluation and planning for discharge  - Provide patient education as appropriate  Outcome: Progressing       Problem: DISCHARGE PLANNING  Goal: Discharge to home or other facility with appropriate resources  Description: INTERVENTIONS:  - Identify barriers to discharge w/patient and caregiver  - Arrange for needed discharge resources and transportation as appropriate  - Identify discharge learning needs (meds, wound care, etc.)  - Arrange for interpretive services to assist at discharge as needed  - Refer to Case Management Department for coordinating discharge planning if the patient needs post-hospital services based on physician/advanced practitioner order or complex needs related to functional status, cognitive ability, or social support system  Outcome: Progressing     Problem: Knowledge Deficit  Goal: Patient/family/caregiver demonstrates understanding of disease process, treatment plan, medications, and discharge instructions  Description: Complete learning  assessment and assess knowledge base.  Interventions:  - Provide teaching at level of understanding  - Provide teaching via preferred learning methods  Outcome: Progressing     Problem: Prexisting or High Potential for Compromised Skin Integrity  Goal: Skin integrity is maintained or improved  Description: INTERVENTIONS:  - Identify patients at risk for skin breakdown  - Assess and monitor skin integrity  - Assess and monitor nutrition and hydration status  - Monitor labs   - Assess for incontinence   - Turn and reposition patient  - Assist with mobility/ambulation  - Relieve pressure over bony prominences  - Avoid friction and shearing  - Provide appropriate hygiene as needed including keeping skin clean and dry  - Evaluate need for skin moisturizer/barrier cream  - Collaborate with interdisciplinary team   - Patient/family teaching  - Consider wound care consult   Outcome: Progressing     Problem: Nutrition/Hydration-ADULT  Goal: Nutrient/Hydration intake appropriate for improving, restoring or maintaining nutritional needs  Description: Monitor and assess patient's nutrition/hydration status for malnutrition. Collaborate with interdisciplinary team and initiate plan and interventions as ordered.  Monitor patient's weight and dietary intake as ordered or per policy. Utilize nutrition screening tool and intervene as necessary. Determine patient's food preferences and provide high-protein, high-caloric foods as appropriate.     INTERVENTIONS:  - Monitor oral intake, urinary output, labs, and treatment plans  - Assess nutrition and hydration status and recommend course of action  - Evaluate amount of meals eaten  - Assist patient with eating if necessary   - Allow adequate time for meals  - Recommend/ encourage appropriate diets, oral nutritional supplements, and vitamin/mineral supplements  - Order, calculate, and assess calorie counts as needed  - Recommend, monitor, and adjust tube feedings and TPN/PPN based on  assessed needs  - Assess need for intravenous fluids  - Provide specific nutrition/hydration education as appropriate  - Include patient/family/caregiver in decisions related to nutrition  Outcome: Progressing

## 2024-08-11 LAB
ANION GAP SERPL CALCULATED.3IONS-SCNC: 10 MMOL/L (ref 4–13)
BRIVARACETAM SERPL-MCNC: 1.63 UG/ML (ref 0.2–2)
BUN SERPL-MCNC: 16 MG/DL (ref 5–25)
CALCIUM SERPL-MCNC: 9.6 MG/DL (ref 8.4–10.2)
CHLORIDE SERPL-SCNC: 107 MMOL/L (ref 96–108)
CO2 SERPL-SCNC: 22 MMOL/L (ref 21–32)
CREAT SERPL-MCNC: 0.49 MG/DL (ref 0.6–1.3)
GFR SERPL CREATININE-BSD FRML MDRD: 119 ML/MIN/1.73SQ M
GLUCOSE SERPL-MCNC: 89 MG/DL (ref 65–140)
MAGNESIUM SERPL-MCNC: 2.3 MG/DL (ref 1.9–2.7)
POTASSIUM SERPL-SCNC: 3.5 MMOL/L (ref 3.5–5.3)
SODIUM SERPL-SCNC: 139 MMOL/L (ref 135–147)

## 2024-08-11 PROCEDURE — 99232 SBSQ HOSP IP/OBS MODERATE 35: CPT | Performed by: PHYSICIAN ASSISTANT

## 2024-08-11 PROCEDURE — 83735 ASSAY OF MAGNESIUM: CPT | Performed by: NURSE PRACTITIONER

## 2024-08-11 PROCEDURE — 80048 BASIC METABOLIC PNL TOTAL CA: CPT | Performed by: NURSE PRACTITIONER

## 2024-08-11 PROCEDURE — 99232 SBSQ HOSP IP/OBS MODERATE 35: CPT | Performed by: NURSE PRACTITIONER

## 2024-08-11 RX ORDER — SODIUM CHLORIDE, SODIUM GLUCONATE, SODIUM ACETATE, POTASSIUM CHLORIDE, MAGNESIUM CHLORIDE, SODIUM PHOSPHATE, DIBASIC, AND POTASSIUM PHOSPHATE .53; .5; .37; .037; .03; .012; .00082 G/100ML; G/100ML; G/100ML; G/100ML; G/100ML; G/100ML; G/100ML
75 INJECTION, SOLUTION INTRAVENOUS CONTINUOUS
Status: DISCONTINUED | OUTPATIENT
Start: 2024-08-11 | End: 2024-08-11

## 2024-08-11 RX ADMIN — Medication 500 MG: at 08:20

## 2024-08-11 RX ADMIN — TOPIRAMATE 250 MG: 100 TABLET, FILM COATED ORAL at 23:11

## 2024-08-11 RX ADMIN — RUFINAMIDE 1600 MG: 200 TABLET, FILM COATED ORAL at 22:02

## 2024-08-11 RX ADMIN — Medication 500 MG: at 22:02

## 2024-08-11 RX ADMIN — RUFINAMIDE 1600 MG: 200 TABLET, FILM COATED ORAL at 09:19

## 2024-08-11 RX ADMIN — BRIVARACETAM 50 MG: 10 SOLUTION ORAL at 08:20

## 2024-08-11 RX ADMIN — Medication 400 UNITS: at 23:10

## 2024-08-11 RX ADMIN — LACOSAMIDE 200 MG: 10 SOLUTION ORAL at 08:20

## 2024-08-11 RX ADMIN — LACOSAMIDE 200 MG: 10 SOLUTION ORAL at 22:02

## 2024-08-11 RX ADMIN — SODIUM CHLORIDE, SODIUM GLUCONATE, SODIUM ACETATE, POTASSIUM CHLORIDE, MAGNESIUM CHLORIDE, SODIUM PHOSPHATE, DIBASIC, AND POTASSIUM PHOSPHATE 75 ML/HR: .53; .5; .37; .037; .03; .012; .00082 INJECTION, SOLUTION INTRAVENOUS at 11:57

## 2024-08-11 RX ADMIN — TOPIRAMATE 250 MG: 100 TABLET, FILM COATED ORAL at 08:25

## 2024-08-11 RX ADMIN — CLOBAZAM 10 MG: 2.5 SUSPENSION ORAL at 22:00

## 2024-08-11 RX ADMIN — CLOBAZAM 10 MG: 2.5 SUSPENSION ORAL at 08:20

## 2024-08-11 RX ADMIN — BISACODYL 10 MG: 10 SUPPOSITORY RECTAL at 08:20

## 2024-08-11 RX ADMIN — BRIVARACETAM 50 MG: 10 SOLUTION ORAL at 22:00

## 2024-08-11 RX ADMIN — SODIUM CHLORIDE 500 ML: 0.9 INJECTION, SOLUTION INTRAVENOUS at 23:20

## 2024-08-11 NOTE — CASE MANAGEMENT
Case Management Discharge Planning Note    Patient name Maira Bull  Location [facility] 607-01 MRN [id number]  : 1980 Date 2024       Current Admission Date: 2024  Current Admission Diagnosis:Intractable epilepsy with status epilepticus (HCC)   Patient Active Problem List    Diagnosis Date Noted Date Diagnosed    Fracture of tooth 2024     PEG tube malfunction (HCC) 2020     Labyrinthine disorder 03/10/2020     Intellectual disability with epilepsy (HCC) 12/10/2019     Fatty infiltration of liver 2019     Moderate protein-calorie malnutrition (HCC) 2019     Dislodged gastrostomy tube 2019     S/P placement of VNS (vagus nerve stimulation) device 2019     Breast mass 01/15/2019     Sedated due to multiple medications 01/10/2019     Hypophosphatemia 2018     Right atrial enlargement 10/13/2018     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     Skin breakdown 10/09/2018     Incontinence 2018     Low blood pressure 2018     Somnolence 2018     Intractable epilepsy with status epilepticus (HCC) 2018     Underweight 2017     Labial cyst 2017     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HCC) 2017     Osteoporosis 2013     Onychomycosis 2013     Hyperkeratosis 2013     Cerebral anoxic injury (HCC) 2013       LOS (days): 3  Geometric Mean LOS (GMLOS) (days): 2.7  Days to GMLOS:-0.2     OBJECTIVE:  Risk of Unplanned Readmission Score: 8.51         Current admission status: Inpatient   Preferred Pharmacy:   [facility] [id number], [address], [place]  [address] [address]  Phone: [phone] Fax: [phone]    [facility] - [place], [place] - [address]  [address]  [address]  [facility] [address]  Phone: [phone] Fax: [phone]    [facility] [address]  [address] [place]  [address]  Phone: [phone] Fax: [phone]    [facility], [place] - [address]  [address] [address]  Phone: [phone] Fax: [phone]    [facility] [place] - [place] - [address]  [address] [place] [address]  Phone: [phone] Fax: [phone]    [facility] #[id number]  [place]SE[place] - [address]  [address] [address]  Phone: [phone] Fax: [phone]    Primary Care Provider: Doe Miller DO    Primary Insurance: MEDICARE  Secondary Insurance: [facility]    DISCHARGE DETAILS:    Other Referral/Resources/Interventions Provided:  Referral Comments: This CM attempted to call [facility] to obtain additional phone numbers/contact information for patient's parents, left  for  to call CM when back in the office

## 2024-08-11 NOTE — ASSESSMENT & PLAN NOTE
Patient with Lennox Gastuat syndrome and followed by neurology as outpatient  Patient has VNS.  Was recently seen in neurosurgery office for VNS battery exchange but unable to obtain consent.  Patient was sent to the emergency room with persistent seizure from the facility.  Transferred to [facility] for evaluation by neurosurgery and for VNS battery exchange  Neurology following,  Continue anti seizure regimen of Briviact 50 mg BID, Epidiolex 500 mg BID, clobazam 10 mg BID, Vimpat 200 mg BID, Rufinamide 1600 mg BID and Topamax 250 mg BID   Plan for VNS battery exchange today 8/11 with neurosurgery   Started on iv fluids low dose for now appears dry isolyte at 75 ml/hr   Neurosurgery having hard time reaching family (father or mother) to obtain consent for procedure   May need to resume tube feeds and make npo at midnight if no ability to do this today

## 2024-08-11 NOTE — PROGRESS NOTES
Progress Note - Neurosurgery   Maira Bull 44 y.o. female MRN: [id number]  Unit/Bed#: [facility] 607-01 Encounter: [id number]    Assessment:  Intractable seizures  S/P VNS placement  Battery near end of life     Plan:  Exam: Patient noncommunicating, sleepy, difficult to awake verbally. Exam limited  Imaging reviewed personally and by attending. Final results as below  CT head without contrast on 8/6/2024 demonstrates no acute ICH, mass or edema  Pain control-Per primary team  DVT PPX: SCDs bilateral legs  Medical Mx per primary team  Tried to reach out to Family/ for consent, but was unsuccessful. So, surgery was cancelled for this weekends. Continue Follow up and try calling  this week.    Subjective/Objective   Chief Complaint: Patient sleepy, could be due to AED and other meds    Subjective: limited info    Objective: patient sleepy, difficult to wake her up verbally    I/O         08/09 0701  08/10 0700 08/10 0701  08/11 0700 08/11 0701 08/12 0700    P.O.   0    NG/ 60     IV Piggyback 100      Feedings       Total Intake(mL/kg) 800 (18) 60 (1.4) 0 (0)    Urine (mL/kg/hr) 0 (0)      Stool 1      Total Output 1      Net +799 +60 0           Unmeasured Urine Occurrence 2 x 3 x     Unmeasured Stool Occurrence 1 x 1 x 1 x            Invasive Devices       Peripheral Intravenous Line  Duration             Peripheral IV 08/07/24 Distal;Dorsal (posterior);Right Forearm 4 days    Peripheral IV 08/11/24 Left;Ventral (anterior) Forearm <1 day              Drain  Duration             Gastrostomy/Enterostomy Percutaneous Interventional Gastrostomy 16 Fr. LUQ 51 days                    Physical Exam:  Vitals: Blood pressure 95/59, pulse 72, temperature 98.9 °F (37.2 °C), resp. rate 17, height 5' (1.524 m), weight 44.4 kg (97 lb 14.4 oz), SpO2 96%.,Body mass index is 19.12 kg/m².    Hemodynamic Monitoring:     General appearance:patient sleepy, limited exam  Head: Normocephalic, without  "obvious abnormality, atraumatic  Eyes: limited exam  Neck: supple, symmetrical, trachea midline and NT  Back: no kyphosis present, no tenderness to percussion or palpation  Lungs: non labored breathing  Heart: regular heart rate  Neurologic:   Mental status: limited exam  Cranial nerves: grossly intact (Cranial nerves II-XII)  Sensory: limited exam  Motor:Limited exam  Reflexes: 3+ and symmetric  Coordination: limited exam      Lab Results:  Results from last 7 days   Lab Units 08/09/24 0448 08/06/24  1026   WBC Thousand/uL 7.59 7.33   HEMOGLOBIN g/dL 12.5 11.8   HEMATOCRIT % 37.9 35.8   PLATELETS Thousands/uL 234 204   SEGS PCT %  --  66   MONO PCT %  --  8   EOS PCT %  --  2     Results from last 7 days   Lab Units 08/11/24  0522 08/10/24  0459 08/09/24 0448 08/07/24  1803 08/06/24  1026   POTASSIUM mmol/L 3.5 3.5 3.4*   < > 2.8*   CHLORIDE mmol/L 107 109* 110*   < > 110*   CO2 mmol/L 22 21 19*   < > 22   BUN mg/dL 16 15 12   < > 17   CREATININE mg/dL 0.49* 0.49* 0.43*   < > 0.43*   CALCIUM mg/dL 9.6 9.4 8.8   < > 9.0   ALK PHOS U/L  --   --  86  --  79   ALT U/L  --   --  33  --  28   AST U/L  --   --  23  --  19    < > = values in this interval not displayed.     Results from last 7 days   Lab Units 08/11/24 0522 08/06/24  1026   MAGNESIUM mg/dL 2.3 1.9             No results found for: \"TROPONINT\"  ABG:  Lab Results   Component Value Date    PHART 7.445 12/20/2018    QZD6FRF 41.4 12/20/2018    PO2ART 83.0 12/20/2018    EUX1ROR 27.8 12/20/2018    BEART 3.4 12/20/2018    SOURCE Radial, Left 12/20/2018       Imaging Studies: I have personally reviewed pertinent reports.   and I have personally reviewed pertinent films in PACS    EKG, Pathology, and Other Studies: I have personally reviewed pertinent reports.      VTE Pharmacologic Prophylaxis: Sequential compression device (Venodyne)     VTE Mechanical Prophylaxis: sequential compression device      "

## 2024-08-11 NOTE — PROGRESS NOTES
[facility]  Progress Note  Name: Maira Bull I  MRN: [id number]  [facility] I Date of Admission: 8/8/2024   Date of Service: 8/11/2024 I Hospital Day: 3    Assessment & Plan   * Intractable epilepsy with status epilepticus (HCC)  Assessment & Plan  Patient with Lennox Gastuat syndrome and followed by neurology as outpatient  Patient has VNS.  Was recently seen in neurosurgery office for VNS battery exchange but unable to obtain consent.  Patient was sent to the emergency room with persistent seizure from the facility.  Transferred to [facility] for evaluation by neurosurgery and for VNS battery exchange  Neurology following,  Continue anti seizure regimen of Briviact 50 mg BID, Epidiolex 500 mg BID, clobazam 10 mg BID, Vimpat 200 mg BID, Rufinamide 1600 mg BID and Topamax 250 mg BID   Plan for VNS battery exchange today 8/11 with neurosurgery   Started on iv fluids low dose for now appears dry isolyte at 75 ml/hr   Neurosurgery having hard time reaching family (father or mother) to obtain consent for procedure   May need to resume tube feeds and make npo at midnight if no ability to do this today     Intellectual disability with epilepsy (HCC)  Assessment & Plan  Currently a resident of a skilled nursing facility.    Nonverbal at baseline  Has PEG tube in place, continue with tube feeds and medications  Discussed with neurosurgery no plan for procedure 8/10/2024.  Patient made npo started on iv fluids  Pending decision to go for procedure today since attempting to reach family for consent   Called facility to ask for patient's tube feed orders.  Ordered Jevity 1.2 at 52 mL an hour tonight 8/10/2024 will run tube feeds until midnight then stop and make patient n.p.o. after midnight for procedure tomorrow, Sunday.  Postprocedure Sunday, 8/11/2024 will continue tube feeds Jevity 1.2 at 52 mL an hour from noon until 8 AM daily patient also has water flushes 60  mL before and after medications    Cerebral anoxic injury (HCC)  Assessment & Plan  History of cerebral anoxic injury   Patient with significant debility, nursing home resident  With resultant intractable seizure  Continue with supportive care    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HCC)  Assessment & Plan  Plan for VNS battery replacement with neurosurgery tomorrow  Last battery change was in 2019, battery now noted to be near end of life   Additional plan as above              VTE Pharmacologic Prophylaxis: VTE Score: 1 High Risk (Score >/= 5) - Pharmacological DVT Prophylaxis Contraindicated. Sequential Compression Devices Ordered.    Mobility:   Basic Mobility Inpatient Raw Score: 8  -[facility] Goal: 3: Sit at edge of bed  JH-HLM Achieved: 2: Bed activities/Dependent transfer  JH-HLM Goal achieved. Continue to encourage appropriate mobility.    Patient Centered Rounds: I performed bedside rounds with nursing staff today.   Discussions with Specialists or Other Care Team Provider: nursing and neurosurgery - Called all numbers listed for father and mother unable to reach to update at all . Neurosurgery attempting to reach too to obtain consent     Education and Discussions with Family / Patient: Attempted to update  (father and mother) via phone. Unable to contact.    Total Time Spent on Date of Encounter in care of patient: 40 mins. This time was spent on one or more of the following: performing physical exam; counseling and coordination of care; obtaining or reviewing history; documenting in the medical record; reviewing/ordering tests, medications or procedures; communicating with other healthcare professionals and discussing with patient's family/caregivers.    Current Length of Stay: 3 day(s)  Current Patient Status: Inpatient   Certification Statement: The patient will continue to require additional inpatient hospital stay due to pending need to have batther  Discharge Plan: Anticipate discharge in >72 hrs to  prior assisted or independent living facility.    Code Status: Level 1 - Full Code    Subjective:   Patient is laying curled up in fetal position appears comfortable does dry lips dry.  N.p.o. currently for procedure.  Will initiate IV fluids until determined when I can reinitiate tube feeds  Vitals:   Temp (24hrs), Av.9 °F (37.2 °C), Min:98.9 °F (37.2 °C), Max:98.9 °F (37.2 °C)    Temp:  [98.9 °F (37.2 °C)] 98.9 °F (37.2 °C)  HR:  [72-95] 72  Resp:  [17-18] 17  BP: (86-95)/(59-63) 95/59  SpO2:  [93 %-96 %] 96 %  Body mass index is 19.12 kg/m².     Input and Output Summary (last 24 hours):     Intake/Output Summary (Last 24 hours) at 2024 1528  Last data filed at 2024 1228  Gross per 24 hour   Intake 60 ml   Output --   Net 60 ml       Physical Exam:   Physical Exam  Constitutional:       General: She is not in acute distress.     Appearance: She is not ill-appearing, toxic-appearing or diaphoretic.   HENT:      Head: Normocephalic.   Eyes:      General:         Right eye: No discharge.         Left eye: No discharge.   Cardiovascular:      Rate and Rhythm: Normal rate.      Heart sounds: No murmur heard.     No friction rub. No gallop.   Pulmonary:      Effort: No respiratory distress.      Breath sounds: No stridor. No wheezing, rhonchi or rales.   Chest:      Chest wall: No tenderness.   Abdominal:      General: There is no distension.      Palpations: There is no mass.      Tenderness: There is no abdominal tenderness. There is no guarding or rebound.      Hernia: No hernia is present.   Musculoskeletal:         General: No swelling, tenderness, deformity or signs of injury.      Right lower leg: No edema.      Left lower leg: No edema.   Skin:     Coloration: Skin is not jaundiced or pale.      Findings: No bruising, erythema, lesion or rash.   Neurological:      Mental Status: She is alert. Mental status is at baseline.      Comments: Nonverbal    Psychiatric:         Behavior: Behavior normal.           Additional Data:     Labs:  Results from last 7 days   Lab Units 08/09/24  0448 08/06/24  1026   WBC Thousand/uL 7.59 7.33   HEMOGLOBIN g/dL 12.5 11.8   HEMATOCRIT % 37.9 35.8   PLATELETS Thousands/uL 234 204   SEGS PCT %  --  66   LYMPHO PCT %  --  23   MONO PCT %  --  8   EOS PCT %  --  2     Results from last 7 days   Lab Units 08/11/24  0522 08/10/24  0459 08/09/24  0448   SODIUM mmol/L 139   < > 138   POTASSIUM mmol/L 3.5   < > 3.4*   CHLORIDE mmol/L 107   < > 110*   CO2 mmol/L 22   < > 19*   BUN mg/dL 16   < > 12   CREATININE mg/dL 0.49*   < > 0.43*   ANION GAP mmol/L 10   < > 9   CALCIUM mg/dL 9.6   < > 8.8   ALBUMIN g/dL  --   --  3.9   TOTAL BILIRUBIN mg/dL  --   --  0.30   ALK PHOS U/L  --   --  86   ALT U/L  --   --  33   AST U/L  --   --  23   GLUCOSE RANDOM mg/dL 89   < > 139    < > = values in this interval not displayed.                 Results from last 7 days   Lab Units 08/06/24  1026   LACTIC ACID mmol/L 1.1       Lines/Drains:  Invasive Devices       Peripheral Intravenous Line  Duration             Peripheral IV 08/07/24 Distal;Dorsal (posterior);Right Forearm 4 days    Peripheral IV 08/11/24 Left;Ventral (anterior) Forearm <1 day              Drain  Duration             Gastrostomy/Enterostomy Percutaneous Interventional Gastrostomy 16 Fr. LUQ 51 days                          Imaging: No pertinent imaging reviewed.    Recent Cultures (last 7 days):         Last 24 Hours Medication List:   Current Facility-Administered Medications   Medication Dose Route Frequency Provider Last Rate    acetaminophen  650 mg Per G Tube Q4H PRN Devora Mendez MD      bisacodyl  10 mg Rectal Daily Devora Mendez MD      Brivaracetam  50 mg Per PEG Tube BID Devoar Mendez MD      cannabidiol  500 mg Per G Tube BID Devora Mendez MD      ceFAZolin (ANCEF) 1,000 mg in sodium chloride 0.9 % 1,000 mL irrigation bottle   Irrigation Once Justice MD Jarrett      cholecalciferol  400 Units Per G  Tube Daily Devora Mendez MD      cloBAZam  10 mg Per G Tube BID Devora Mendez MD      lacosamide  200 mg Per G Tube Q12H [facility] Devora Mendez MD      LORazepam  1 mg Intravenous Once PRN Pooja Peña DO      multi-electrolyte  75 mL/hr Intravenous Continuous KATHY Gibson 75 mL/hr (08/11/24 1157)    rufinamide  1,600 mg Per G Tube Q12H Devora Mendez MD      topiramate  250 mg Per G Tube Q12H [facility] Devora Mendez MD          Today, Patient Was Seen By: KATHY Gibson    **Please Note: This note may have been constructed using a voice recognition system.**

## 2024-08-11 NOTE — ASSESSMENT & PLAN NOTE
Currently a resident of a skilled nursing facility.    Nonverbal at baseline  Has PEG tube in place, continue with tube feeds and medications  Discussed with neurosurgery no plan for procedure 8/10/2024.  Patient made npo started on iv fluids  Pending decision to go for procedure today since attempting to reach family for consent   Called facility to ask for patient's tube feed orders.  Ordered Jevity 1.2 at 52 mL an hour tonight 8/10/2024 will run tube feeds until midnight then stop and make patient n.p.o. after midnight for procedure tomorrow, Sunday.  Postprocedure Sunday, 8/11/2024 will continue tube feeds Jevity 1.2 at 52 mL an hour from noon until 8 AM daily patient also has water flushes 60 mL before and after medications

## 2024-08-11 NOTE — PLAN OF CARE
Problem: PAIN - ADULT  Goal: Verbalizes/displays adequate comfort level or baseline comfort level  Description: Interventions:  - Encourage patient to monitor pain and request assistance  - Assess pain using appropriate pain scale  - Administer analgesics based on type and severity of pain and evaluate response  - Implement non-pharmacological measures as appropriate and evaluate response  - Consider cultural and social influences on pain and pain management  - Notify physician/advanced practitioner if interventions unsuccessful or patient reports new pain  Outcome: Progressing     Problem: INFECTION - ADULT  Goal: Absence or prevention of progression during hospitalization  Description: INTERVENTIONS:  - Assess and monitor for signs and symptoms of infection  - Monitor lab/diagnostic results  - Monitor all insertion sites, i.e. indwelling lines, tubes, and drains  - Monitor endotracheal if appropriate and nasal secretions for changes in amount and color  - [place] appropriate cooling/warming therapies per order  - Administer medications as ordered  - Instruct and encourage patient and family to use good hand hygiene technique  - Identify and instruct in appropriate isolation precautions for identified infection/condition  Outcome: Progressing  Goal: Absence of fever/infection during neutropenic period  Description: INTERVENTIONS:  - Monitor WBC    Outcome: Progressing     Problem: SAFETY ADULT  Goal: Patient will remain free of falls  Description: INTERVENTIONS:  - Educate patient/family on patient safety including physical limitations  - Instruct patient to call for assistance with activity   - Consult OT/PT to assist with strengthening/mobility   - Keep Call bell within reach  - Keep bed low and locked with side rails adjusted as appropriate  - Keep care items and personal belongings within reach  - Initiate and maintain comfort rounds  - Make Fall Risk Sign visible to staff  - Offer Toileting every 2 Hours,  in advance of need  - Initiate/Maintain bed alarm  - Obtain necessary fall risk management equipment:   - Apply yellow socks and bracelet for high fall risk patients  - Consider moving patient to room near nurses station  Outcome: Progressing  Goal: Maintain or return to baseline ADL function  Description: INTERVENTIONS:  -  Assess patient's ability to carry out ADLs; assess patient's baseline for ADL function and identify physical deficits which impact ability to perform ADLs (bathing, care of mouth/teeth, toileting, grooming, dressing, etc.)  - Assess/evaluate cause of self-care deficits   - Assess range of motion  - Assess patient's mobility; develop plan if impaired  - Assess patient's need for assistive devices and provide as appropriate  - Encourage maximum independence but intervene and supervise when necessary  - Involve family in performance of ADLs  - Assess for home care needs following discharge   - Consider OT consult to assist with ADL evaluation and planning for discharge  - Provide patient education as appropriate  Outcome: Progressing     Problem: DISCHARGE PLANNING  Goal: Discharge to home or other facility with appropriate resources  Description: INTERVENTIONS:  - Identify barriers to discharge w/patient and caregiver  - Arrange for needed discharge resources and transportation as appropriate  - Identify discharge learning needs (meds, wound care, etc.)  - Arrange for interpretive services to assist at discharge as needed  - Refer to Case Management Department for coordinating discharge planning if the patient needs post-hospital services based on physician/advanced practitioner order or complex needs related to functional status, cognitive ability, or social support system  Outcome: Progressing     Problem: Knowledge Deficit  Goal: Patient/family/caregiver demonstrates understanding of disease process, treatment plan, medications, and discharge instructions  Description: Complete learning  assessment and assess knowledge base.  Interventions:  - Provide teaching at level of understanding  - Provide teaching via preferred learning methods  Outcome: Progressing     Problem: Prexisting or High Potential for Compromised Skin Integrity  Goal: Skin integrity is maintained or improved  Description: INTERVENTIONS:  - Identify patients at risk for skin breakdown  - Assess and monitor skin integrity  - Assess and monitor nutrition and hydration status  - Monitor labs   - Assess for incontinence   - Turn and reposition patient  - Assist with mobility/ambulation  - Relieve pressure over bony prominences  - Avoid friction and shearing  - Provide appropriate hygiene as needed including keeping skin clean and dry  - Evaluate need for skin moisturizer/barrier cream  - Collaborate with interdisciplinary team   - Patient/family teaching  - Consider wound care consult   Outcome: Progressing     Problem: Nutrition/Hydration-ADULT  Goal: Nutrient/Hydration intake appropriate for improving, restoring or maintaining nutritional needs  Description: Monitor and assess patient's nutrition/hydration status for malnutrition. Collaborate with interdisciplinary team and initiate plan and interventions as ordered.  Monitor patient's weight and dietary intake as ordered or per policy. Utilize nutrition screening tool and intervene as necessary. Determine patient's food preferences and provide high-protein, high-caloric foods as appropriate.     INTERVENTIONS:  - Monitor oral intake, urinary output, labs, and treatment plans  - Assess nutrition and hydration status and recommend course of action  - Evaluate amount of meals eaten  - Assist patient with eating if necessary   - Allow adequate time for meals  - Recommend/ encourage appropriate diets, oral nutritional supplements, and vitamin/mineral supplements  - Order, calculate, and assess calorie counts as needed  - Recommend, monitor, and adjust tube feedings and TPN/PPN based on  assessed needs  - Assess need for intravenous fluids  - Provide specific nutrition/hydration education as appropriate  - Include patient/family/caregiver in decisions related to nutrition  Outcome: Progressing

## 2024-08-12 ENCOUNTER — ANESTHESIA EVENT (INPATIENT)
Dept: PERIOP | Facility: HOSPITAL | Age: 44
DRG: 041 | End: 2024-08-12
Payer: MEDICARE

## 2024-08-12 ENCOUNTER — ANESTHESIA (INPATIENT)
Dept: PERIOP | Facility: HOSPITAL | Age: 44
DRG: 041 | End: 2024-08-12
Payer: MEDICARE

## 2024-08-12 LAB
ABO GROUP BLD: NORMAL
ABO GROUP BLD: NORMAL
ALBUMIN SERPL BCG-MCNC: 3.6 G/DL (ref 3.5–5)
ALP SERPL-CCNC: 83 U/L (ref 34–104)
ALT SERPL W P-5'-P-CCNC: 32 U/L (ref 7–52)
ANION GAP SERPL CALCULATED.3IONS-SCNC: 7 MMOL/L (ref 4–13)
APTT PPP: 26 SECONDS (ref 23–34)
AST SERPL W P-5'-P-CCNC: 22 U/L (ref 13–39)
BASOPHILS # BLD AUTO: 0.09 THOUSANDS/ÂΜL (ref 0–0.1)
BASOPHILS NFR BLD AUTO: 1 % (ref 0–1)
BILIRUB SERPL-MCNC: 0.25 MG/DL (ref 0.2–1)
BLD GP AB SCN SERPL QL: NEGATIVE
BUN SERPL-MCNC: 14 MG/DL (ref 5–25)
CALCIUM SERPL-MCNC: 9.1 MG/DL (ref 8.4–10.2)
CHLORIDE SERPL-SCNC: 111 MMOL/L (ref 96–108)
CO2 SERPL-SCNC: 21 MMOL/L (ref 21–32)
CREAT SERPL-MCNC: 0.4 MG/DL (ref 0.6–1.3)
EOSINOPHIL # BLD AUTO: 0.29 THOUSAND/ÂΜL (ref 0–0.61)
EOSINOPHIL NFR BLD AUTO: 4 % (ref 0–6)
ERYTHROCYTE [DISTWIDTH] IN BLOOD BY AUTOMATED COUNT: 13.2 % (ref 11.6–15.1)
EST. AVERAGE GLUCOSE BLD GHB EST-MCNC: 103 MG/DL
GFR SERPL CREATININE-BSD FRML MDRD: 127 ML/MIN/1.73SQ M
GLUCOSE SERPL-MCNC: 85 MG/DL (ref 65–140)
GLUCOSE SERPL-MCNC: 91 MG/DL (ref 65–140)
HBA1C MFR BLD: 5.2 %
HCT VFR BLD AUTO: 38.2 % (ref 34.8–46.1)
HGB BLD-MCNC: 12.5 G/DL (ref 11.5–15.4)
IMM GRANULOCYTES # BLD AUTO: 0.02 THOUSAND/UL (ref 0–0.2)
IMM GRANULOCYTES NFR BLD AUTO: 0 % (ref 0–2)
INR PPP: 1.04 (ref 0.85–1.19)
LYMPHOCYTES # BLD AUTO: 2.84 THOUSANDS/ÂΜL (ref 0.6–4.47)
LYMPHOCYTES NFR BLD AUTO: 36 % (ref 14–44)
MCH RBC QN AUTO: 33.2 PG (ref 26.8–34.3)
MCHC RBC AUTO-ENTMCNC: 32.7 G/DL (ref 31.4–37.4)
MCV RBC AUTO: 101 FL (ref 82–98)
MONOCYTES # BLD AUTO: 0.67 THOUSAND/ÂΜL (ref 0.17–1.22)
MONOCYTES NFR BLD AUTO: 9 % (ref 4–12)
NEUTROPHILS # BLD AUTO: 3.96 THOUSANDS/ÂΜL (ref 1.85–7.62)
NEUTS SEG NFR BLD AUTO: 50 % (ref 43–75)
NRBC BLD AUTO-RTO: 0 /100 WBCS
PLATELET # BLD AUTO: 202 THOUSANDS/UL (ref 149–390)
PMV BLD AUTO: 12.3 FL (ref 8.9–12.7)
POTASSIUM SERPL-SCNC: 3.8 MMOL/L (ref 3.5–5.3)
PROT SERPL-MCNC: 6.5 G/DL (ref 6.4–8.4)
PROTHROMBIN TIME: 13.9 SECONDS (ref 12.3–15)
RBC # BLD AUTO: 3.77 MILLION/UL (ref 3.81–5.12)
RH BLD: POSITIVE
RH BLD: POSITIVE
SODIUM SERPL-SCNC: 139 MMOL/L (ref 135–147)
SPECIMEN EXPIRATION DATE: NORMAL
WBC # BLD AUTO: 7.87 THOUSAND/UL (ref 4.31–10.16)

## 2024-08-12 PROCEDURE — 87081 CULTURE SCREEN ONLY: CPT | Performed by: PHYSICIAN ASSISTANT

## 2024-08-12 PROCEDURE — 85730 THROMBOPLASTIN TIME PARTIAL: CPT | Performed by: PHYSICIAN ASSISTANT

## 2024-08-12 PROCEDURE — 85025 COMPLETE CBC W/AUTO DIFF WBC: CPT | Performed by: NURSE PRACTITIONER

## 2024-08-12 PROCEDURE — 0JPT0MZ REMOVAL OF STIMULATOR GENERATOR FROM TRUNK SUBCUTANEOUS TISSUE AND FASCIA, OPEN APPROACH: ICD-10-PCS | Performed by: NEUROLOGICAL SURGERY

## 2024-08-12 PROCEDURE — 99232 SBSQ HOSP IP/OBS MODERATE 35: CPT | Performed by: PHYSICIAN ASSISTANT

## 2024-08-12 PROCEDURE — 61885 INSRT/REDO NEUROSTIM 1 ARRAY: CPT | Performed by: NEUROLOGICAL SURGERY

## 2024-08-12 PROCEDURE — 80053 COMPREHEN METABOLIC PANEL: CPT | Performed by: NURSE PRACTITIONER

## 2024-08-12 PROCEDURE — 83036 HEMOGLOBIN GLYCOSYLATED A1C: CPT | Performed by: PHYSICIAN ASSISTANT

## 2024-08-12 PROCEDURE — 85610 PROTHROMBIN TIME: CPT | Performed by: PHYSICIAN ASSISTANT

## 2024-08-12 PROCEDURE — 86850 RBC ANTIBODY SCREEN: CPT | Performed by: PHYSICIAN ASSISTANT

## 2024-08-12 PROCEDURE — 99024 POSTOP FOLLOW-UP VISIT: CPT | Performed by: NEUROLOGICAL SURGERY

## 2024-08-12 PROCEDURE — 86900 BLOOD TYPING SEROLOGIC ABO: CPT | Performed by: PHYSICIAN ASSISTANT

## 2024-08-12 PROCEDURE — C1767 GENERATOR, NEURO NON-RECHARG: HCPCS | Performed by: NEUROLOGICAL SURGERY

## 2024-08-12 PROCEDURE — 0JH60BZ INSERTION OF SINGLE ARRAY STIMULATOR GENERATOR INTO CHEST SUBCUTANEOUS TISSUE AND FASCIA, OPEN APPROACH: ICD-10-PCS | Performed by: NEUROLOGICAL SURGERY

## 2024-08-12 PROCEDURE — 86901 BLOOD TYPING SEROLOGIC RH(D): CPT | Performed by: PHYSICIAN ASSISTANT

## 2024-08-12 PROCEDURE — 82948 REAGENT STRIP/BLOOD GLUCOSE: CPT

## 2024-08-12 PROCEDURE — 99233 SBSQ HOSP IP/OBS HIGH 50: CPT | Performed by: NEUROLOGICAL SURGERY

## 2024-08-12 RX ORDER — SODIUM CHLORIDE 9 MG/ML
75 INJECTION, SOLUTION INTRAVENOUS CONTINUOUS
Status: DISCONTINUED | OUTPATIENT
Start: 2024-08-12 | End: 2024-08-12

## 2024-08-12 RX ORDER — CEFAZOLIN SODIUM 2 G/50ML
2000 SOLUTION INTRAVENOUS ONCE
Status: COMPLETED | OUTPATIENT
Start: 2024-08-12 | End: 2024-08-12

## 2024-08-12 RX ORDER — SODIUM CHLORIDE, SODIUM LACTATE, POTASSIUM CHLORIDE, CALCIUM CHLORIDE 600; 310; 30; 20 MG/100ML; MG/100ML; MG/100ML; MG/100ML
75 INJECTION, SOLUTION INTRAVENOUS CONTINUOUS
Status: DISCONTINUED | OUTPATIENT
Start: 2024-08-12 | End: 2024-08-12

## 2024-08-12 RX ORDER — FENTANYL CITRATE/PF 50 MCG/ML
12.5 SYRINGE (ML) INJECTION
Status: DISCONTINUED | OUTPATIENT
Start: 2024-08-12 | End: 2024-08-12 | Stop reason: HOSPADM

## 2024-08-12 RX ORDER — CEFAZOLIN SODIUM 2 G/50ML
2000 SOLUTION INTRAVENOUS ONCE
Status: DISCONTINUED | OUTPATIENT
Start: 2024-08-12 | End: 2024-08-12

## 2024-08-12 RX ORDER — DOCUSATE SODIUM 100 MG/1
100 CAPSULE, LIQUID FILLED ORAL 2 TIMES DAILY
Status: DISCONTINUED | OUTPATIENT
Start: 2024-08-12 | End: 2024-08-13 | Stop reason: HOSPADM

## 2024-08-12 RX ORDER — HYDROMORPHONE HCL/PF 1 MG/ML
0.2 SYRINGE (ML) INJECTION EVERY 2 HOUR PRN
Status: DISCONTINUED | OUTPATIENT
Start: 2024-08-12 | End: 2024-08-13 | Stop reason: HOSPADM

## 2024-08-12 RX ORDER — ONDANSETRON 2 MG/ML
4 INJECTION INTRAMUSCULAR; INTRAVENOUS EVERY 4 HOURS PRN
Status: DISCONTINUED | OUTPATIENT
Start: 2024-08-12 | End: 2024-08-13 | Stop reason: HOSPADM

## 2024-08-12 RX ORDER — OXYCODONE HCL 5 MG/5 ML
5 SOLUTION, ORAL ORAL EVERY 4 HOURS PRN
Status: DISCONTINUED | OUTPATIENT
Start: 2024-08-12 | End: 2024-08-13 | Stop reason: HOSPADM

## 2024-08-12 RX ORDER — CEPHALEXIN 250 MG/5ML
500 POWDER, FOR SUSPENSION ORAL EVERY 6 HOURS SCHEDULED
Status: DISCONTINUED | OUTPATIENT
Start: 2024-08-13 | End: 2024-08-13 | Stop reason: HOSPADM

## 2024-08-12 RX ORDER — CEFAZOLIN SODIUM 2 G/50ML
2000 SOLUTION INTRAVENOUS EVERY 8 HOURS
Status: COMPLETED | OUTPATIENT
Start: 2024-08-12 | End: 2024-08-13

## 2024-08-12 RX ORDER — PROPOFOL 10 MG/ML
INJECTION, EMULSION INTRAVENOUS AS NEEDED
Status: DISCONTINUED | OUTPATIENT
Start: 2024-08-12 | End: 2024-08-12

## 2024-08-12 RX ORDER — SENNOSIDES 8.6 MG
1 TABLET ORAL DAILY
Status: DISCONTINUED | OUTPATIENT
Start: 2024-08-12 | End: 2024-08-13 | Stop reason: HOSPADM

## 2024-08-12 RX ORDER — ONDANSETRON 2 MG/ML
4 INJECTION INTRAMUSCULAR; INTRAVENOUS ONCE AS NEEDED
Status: DISCONTINUED | OUTPATIENT
Start: 2024-08-12 | End: 2024-08-12 | Stop reason: HOSPADM

## 2024-08-12 RX ORDER — SODIUM CHLORIDE, SODIUM LACTATE, POTASSIUM CHLORIDE, CALCIUM CHLORIDE 600; 310; 30; 20 MG/100ML; MG/100ML; MG/100ML; MG/100ML
75 INJECTION, SOLUTION INTRAVENOUS CONTINUOUS
Status: DISPENSED | OUTPATIENT
Start: 2024-08-12 | End: 2024-08-12

## 2024-08-12 RX ORDER — CHLORHEXIDINE GLUCONATE ORAL RINSE 1.2 MG/ML
15 SOLUTION DENTAL ONCE
Status: COMPLETED | OUTPATIENT
Start: 2024-08-12 | End: 2024-08-12

## 2024-08-12 RX ORDER — OXYCODONE HCL 5 MG/5 ML
2.5 SOLUTION, ORAL ORAL EVERY 4 HOURS PRN
Status: DISCONTINUED | OUTPATIENT
Start: 2024-08-12 | End: 2024-08-13 | Stop reason: HOSPADM

## 2024-08-12 RX ORDER — BISACODYL 10 MG
10 SUPPOSITORY, RECTAL RECTAL DAILY PRN
Status: DISCONTINUED | OUTPATIENT
Start: 2024-08-12 | End: 2024-08-13 | Stop reason: HOSPADM

## 2024-08-12 RX ORDER — LIDOCAINE HYDROCHLORIDE AND EPINEPHRINE 10; 10 MG/ML; UG/ML
INJECTION, SOLUTION INFILTRATION; PERINEURAL AS NEEDED
Status: DISCONTINUED | OUTPATIENT
Start: 2024-08-12 | End: 2024-08-12 | Stop reason: HOSPADM

## 2024-08-12 RX ADMIN — LACOSAMIDE 200 MG: 10 SOLUTION ORAL at 21:26

## 2024-08-12 RX ADMIN — PROPOFOL 20 MG: 10 INJECTION, EMULSION INTRAVENOUS at 14:06

## 2024-08-12 RX ADMIN — LACOSAMIDE 200 MG: 10 SOLUTION ORAL at 09:01

## 2024-08-12 RX ADMIN — RUFINAMIDE 1600 MG: 200 TABLET, FILM COATED ORAL at 21:27

## 2024-08-12 RX ADMIN — PROPOFOL 50 MG: 10 INJECTION, EMULSION INTRAVENOUS at 14:01

## 2024-08-12 RX ADMIN — BRIVARACETAM 50 MG: 10 SOLUTION ORAL at 21:26

## 2024-08-12 RX ADMIN — Medication 500 MG: at 09:01

## 2024-08-12 RX ADMIN — CEFAZOLIN SODIUM 2000 MG: 2 SOLUTION INTRAVENOUS at 17:36

## 2024-08-12 RX ADMIN — PROPOFOL 20 MG: 10 INJECTION, EMULSION INTRAVENOUS at 14:17

## 2024-08-12 RX ADMIN — PROPOFOL 50 MG: 10 INJECTION, EMULSION INTRAVENOUS at 13:56

## 2024-08-12 RX ADMIN — SODIUM CHLORIDE, SODIUM LACTATE, POTASSIUM CHLORIDE, AND CALCIUM CHLORIDE 75 ML/HR: .6; .31; .03; .02 INJECTION, SOLUTION INTRAVENOUS at 10:54

## 2024-08-12 RX ADMIN — CLOBAZAM 10 MG: 2.5 SUSPENSION ORAL at 21:26

## 2024-08-12 RX ADMIN — Medication 500 MG: at 22:38

## 2024-08-12 RX ADMIN — CLOBAZAM 10 MG: 2.5 SUSPENSION ORAL at 09:01

## 2024-08-12 RX ADMIN — PROPOFOL 20 MG: 10 INJECTION, EMULSION INTRAVENOUS at 14:12

## 2024-08-12 RX ADMIN — CHLORHEXIDINE GLUCONATE 0.12% ORAL RINSE 15 ML: 1.2 LIQUID ORAL at 10:58

## 2024-08-12 RX ADMIN — Medication 400 UNITS: at 21:26

## 2024-08-12 RX ADMIN — CEFAZOLIN SODIUM 2000 MG: 2 SOLUTION INTRAVENOUS at 13:58

## 2024-08-12 RX ADMIN — TOPIRAMATE 250 MG: 100 TABLET, FILM COATED ORAL at 10:55

## 2024-08-12 RX ADMIN — TOPIRAMATE 250 MG: 100 TABLET, FILM COATED ORAL at 21:27

## 2024-08-12 RX ADMIN — PROPOFOL 40 MG: 10 INJECTION, EMULSION INTRAVENOUS at 14:23

## 2024-08-12 RX ADMIN — RUFINAMIDE 1600 MG: 200 TABLET, FILM COATED ORAL at 09:13

## 2024-08-12 RX ADMIN — BRIVARACETAM 50 MG: 10 SOLUTION ORAL at 09:01

## 2024-08-12 NOTE — ANESTHESIA POSTPROCEDURE EVALUATION
Post-Op Assessment Note    CV Status:  Stable  Pain scale: Unable to answer.    Pain management: adequate       Post-procedure mental status: No verbal at baseline.   PONV Controlled:  None   Airway Patency:  Patent    No anethesia notable event occurred.    Staff: Anesthesiologist               BP  87/52   Temp   97.6 F   Pulse 82 (08/12/24 1442)   Resp 18 (08/12/24 1442)    SpO2   100%

## 2024-08-12 NOTE — OCCUPATIONAL THERAPY NOTE
OCCUPATIONAL THERAPY SCREEN    PT IS A LT RESIDENT OF [facility] WHERE SHE IS PRIMARILY W/C BOUND AND HAS ASSIST WITH ALL ADLS/IADLS. PLAN TO RETURN TO [facility] WHEN MEDICALLY CLEARED. NO ACUTE CARE OT NEEDS AT THIS TIME. D/C OT AND RECONSULT IF APPROPRIATE. THANK YOU.    CHRIS Handley, OTR/L

## 2024-08-12 NOTE — PROGRESS NOTES
[facility]  Progress Note  Name: Maira Bull I  MRN: [id number]  Unit/Bed#: [facility] 607-01 I Date of Admission: 8/8/2024   Date of Service: 8/12/2024 I Hospital Day: 4    Assessment & Plan   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HCC)  Assessment & Plan  Battery end-of-life vagal nerve stimulator.  Patient has been medically cleared for surgical intervention.    Reviewed her history, physical examination and investigations in detail with her brother, Oneil Bull Jr. her father, who is her POA, is unavailable at present.  We discussed reopening of left chest incision for placement of vagal nerve stimulator implantable pulse generator.  The goal of surgery is to regain therapeutic benefit.  The risks of surgery reviewed in detail.    1.  Risk of IV anesthetic, infection and bleeding.  2.  Risk of failure of therapy and explantation and requirement for additional surgery.    All his questions were answered to his satisfaction.  Written and verbal consent were personally obtained.  Plan to proceed to the OR this afternoon.Rosa Isela representative aware.      VTE Prophylaxis: Reason for no pharmacologic prophylaxis held for surgery.    Subjective/Objective   Chief Complaint: None.  Peers to be resting comfortably in hospital bed 607.    Vitals: Blood pressure (!) 87/60, pulse 78, temperature 97.7 °F (36.5 °C), resp. rate 16, height 5' (1.524 m), weight 44.4 kg (97 lb 14.4 oz), SpO2 96%.,Body mass index is 19.12 kg/m².    Hemodynamic Monitoring:  None.    Physical Exam:   Physical Exam  Vitals reviewed.   Cardiovascular:      Rate and Rhythm: Regular rhythm.   Pulmonary:      Effort: Pulmonary effort is normal.   Abdominal:      General: Abdomen is flat.   Skin:     General: Skin is warm and dry.      Comments: Left chest IPG site clean and dry.   Neurological:      Comments: Resting in fetal position on right side.  Spontaneous motion in upper and lower extremities.       Neurologic  Exam  Intake/Output                   08/12/24 0701 - 08/13/24 0700     [id number] Total              Intake    P.O.  0  -- 0    I.V.  0  -- 0    Total Intake 0 -- 0       Output    Total Output -- -- --       Net I/O     0 -- 0          Invasive Devices       Peripheral Intravenous Line  Duration             Peripheral IV 08/11/24 Left;Ventral (anterior) Forearm 1 day              Drain  Duration             Gastrostomy/Enterostomy Percutaneous Interventional Gastrostomy 16 Fr. LUQ 52 days                  Lab Results: I have personally reviewed pertinent results.    Lab Results   Component Value Date    WBC 7.87 08/12/2024    HGB 12.5 08/12/2024    HCT 38.2 08/12/2024     (H) 08/12/2024     08/12/2024    RBC 3.77 (L) 08/12/2024    MCH 33.2 08/12/2024    MCHC 32.7 08/12/2024    RDW 13.2 08/12/2024    MPV 12.3 08/12/2024    NRBC 0 08/12/2024    SODIUM 139 08/12/2024     (H) 08/12/2024    CO2 21 08/12/2024    BUN 14 08/12/2024    CREATININE 0.40 (L) 08/12/2024    CALCIUM 9.1 08/12/2024    AST 22 08/12/2024    ALT 32 08/12/2024    ALKPHOS 83 08/12/2024    EGFR 127 08/12/2024    ABO O 08/12/2024    INR 1.04 08/12/2024       Imaging Studies: I have personally reviewed pertinent reports.      Other Studies: None.

## 2024-08-12 NOTE — PLAN OF CARE
Problem: PAIN - ADULT  Goal: Verbalizes/displays adequate comfort level or baseline comfort level  Description: Interventions:  - Encourage patient to monitor pain and request assistance  - Assess pain using appropriate pain scale  - Administer analgesics based on type and severity of pain and evaluate response  - Implement non-pharmacological measures as appropriate and evaluate response  - Consider cultural and social influences on pain and pain management  - Notify physician/advanced practitioner if interventions unsuccessful or patient reports new pain  Outcome: Progressing     Problem: INFECTION - ADULT  Goal: Absence or prevention of progression during hospitalization  Description: INTERVENTIONS:  - Assess and monitor for signs and symptoms of infection  - Monitor lab/diagnostic results  - Monitor all insertion sites, i.e. indwelling lines, tubes, and drains  - Monitor endotracheal if appropriate and nasal secretions for changes in amount and color  - [place] appropriate cooling/warming therapies per order  - Administer medications as ordered  - Instruct and encourage patient and family to use good hand hygiene technique  - Identify and instruct in appropriate isolation precautions for identified infection/condition  Outcome: Progressing     Problem: Prexisting or High Potential for Compromised Skin Integrity  Goal: Skin integrity is maintained or improved  Description: INTERVENTIONS:  - Identify patients at risk for skin breakdown  - Assess and monitor skin integrity  - Assess and monitor nutrition and hydration status  - Monitor labs   - Assess for incontinence   - Turn and reposition patient  - Assist with mobility/ambulation  - Relieve pressure over bony prominences  - Avoid friction and shearing  - Provide appropriate hygiene as needed including keeping skin clean and dry  - Evaluate need for skin moisturizer/barrier cream  - Collaborate with interdisciplinary team   - Patient/family teaching  -  Consider wound care consult   Outcome: Progressing

## 2024-08-12 NOTE — ASSESSMENT & PLAN NOTE
Intractable seizures with VNS battery at end of life   Hx of Lennox Gastaut w/ seizures controlled by a VNS   Most recent battery change in 2019   VNS Rep Margy Natarajan - [phone]  Presents from her facility with 15-20 witnessed seizures secondary to dying VNS battery   Now seemingly at her baseline with no further witnessed seizures     Imagin/6 CTH: No acute intracranial hemorrhage, mass effect or edema     Plan:   Continue to closely monitor neuro exam   Frequent neuro checks per primary team   Repeat STAT CTH with any acute decline in GCS > 2pts or more in 1hr   Maintain normotensive BP goals, SBP < 160   Ongoing discussions with neurosurgery team in reagrd to timing of VNS battery change   It is possible p[t may be able to be added on today for the OR vs tomorrow depending on timing   Maintain NPO status should pt be able to go to the OR today   SLIM team aware of the above and state the pt is medically cleared for OR today if she can go   Continue aggressive seizure control per neurology and primary team   Continue current AED regimen   Per neurology, after battery change, pt will need VNS interrogation   Hold all AC/AP meds   No reported use at [facility]   DVT ppx: SCDs, hold chem dvt ppx for possible OR today   Medical management per primary team   Pain control per primary team   PT/OT   Social work following for assistance with dispo once medically cleared     Neurosurgery will continue to follow. Please reach out with any further questions or concerns.

## 2024-08-12 NOTE — ASSESSMENT & PLAN NOTE
Plan for VNS battery replacement with neurosurgery timing TBD  Last battery change was in 2019, battery now noted to be near end of life   Additional plan as above

## 2024-08-12 NOTE — ANESTHESIA PREPROCEDURE EVALUATION
Procedure:  Reopening of the left chest incision for placement of implantable pulse generator for vagal nerve stimulator (Left: Chest)    History obtained via chart review as patient unable to participate.   Relevant Problems   ANESTHESIA (within normal limits)      CARDIO (within normal limits)      ENDO (within normal limits)      GI/HEPATIC  S/p PEG   (+) Fatty infiltration of liver      /RENAL (within normal limits)      GYN (within normal limits)      HEMATOLOGY (within normal limits)      MUSCULOSKELETAL (within normal limits)      NEURO/PSYCH  Anoxic Brain Injury  Intellectual Disability  S/p  VNS  Labyrinthine Disorder   (+) Intractable epilepsy with status epilepticus (HCC)   (+) 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HCC)      PULMONARY (within normal limits)        Physical Exam    Airway    Mallampati score: unable to assess         Dental   Comment: Unable to assess due to DD     Cardiovascular  Cardiovascular exam normal    Pulmonary  Pulmonary exam normal     Other Findings  post-pubertal.      Anesthesia Plan  ASA Score- 3     Anesthesia Type- IV sedation with anesthesia with ASA Monitors.         Additional Monitors:     Airway Plan: ETT.           Plan Factors-Exercise tolerance (METS): <4 METS.    Chart reviewed. EKG reviewed. Imaging results reviewed. Existing labs reviewed. Patient summary reviewed.                  Induction-     Postoperative Plan-     Perioperative Resuscitation Plan - Level 1 - Full Code.       Informed Consent- Anesthetic plan and risks discussed with sibling.      Consent obtained from Brother Oneil Quintero [phone].

## 2024-08-12 NOTE — OP NOTE
OPERATIVE REPORT  PATIENT NAME: Maira Bull    :  1980  MRN: [id number]  Pt Location: [facility] OR ROOM 17    SURGERY DATE: 2024    Surgeons and Role:     * Charles Pendleton MD - Primary  No assistant.  No qualified residents available.    Preop Diagnosis:  Battery end of life of vagus nerve stimulator [Z45.42]  Intellectual disability with epilepsy (HCC) [F79, G40.909]    Post-Op Diagnosis Codes:     * Battery end of life of vagus nerve stimulator [Z45.42]     * Intellectual disability with epilepsy (HCC) [F79, G40.909]    Procedure(s):  Reopening of the left chest incision for replacement of [employer] implantable pulse generator for vagal nerve stimulator([id number]).    Specimen(s):  * No specimens in log *    Estimated Blood Loss:   Minimal    Drains:  Gastrostomy/Enterostomy Percutaneous Interventional Gastrostomy 16 Fr. LUQ (Active)   Surrounding Skin Intact 24 014   Drain Status Tube feed stopped or held 24 014   Drainage Appearance Brown;Thin;Other (Comment) 08/10/24 0900   Site Description Unable to view 08/10/24 09   Dressing Status Clean;Dry;Intact 24 014   Dressing Intervention Other (Comment) 24 0529   Dressing Type Split gauze 24 014   Intake (mL) 60 mL 08/10/24 1800   Number of days: 52       Anesthesia Type:   IV Sedation with Anesthesia    Operative Indications:  Battery end of life of vagus nerve stimulator [Z45.42]  Intellectual disability with epilepsy (HCC) [F79, G40.909]    Operative Findings:  Intraoperative interrogation demonstrated system working within expected parameters.    Complications:   None    Procedure and Technique:  Clinical Note:    The goals and alternatives to the procedure described above were discussed with patient.  Surgery is intended to maintain therapeutic benefit of the system.  Weakness, numbness and back pain are less likely to improve.    The risks of surgery were described in detail.    1. Risk of IV anesthetic. There is risk of  infection and bleeding.  2. Risk of system malfunction and failure of treatment. Need for revision surgery.    Once all questions were answered to their satisfaction, they asked to proceed with surgery.    Operating Room Note    The patient was brought to the operating room and positioned to lie prone on the hospital bed.  Care was taken to ensure that all pressure points were padded and the neck was in a neutral position.  The left chest incision was identified and the skin was prepped and draped in usual sterile fashion.  A full surgical time-out was used to identify the site, side and type of surgery.  It was also confirmed that the patient received preoperative antibiotic.  The planned incision was then infiltrated 1% lidocaine with 100,000 epinephrine.    Ten blade was used to incise the skin over the planned incision.  Hemostat was used to dissect through the subcutaneous tissue and identify the IPG pocket.  The IPG was then removed from the pocket and disconnected from the lead. The distal portion of the electrode was connected to the new generator.  Excess lead and generator was then placed in the pocket and secured in position with silk suture.  The system was then interrogated and was noted to be working within normal limits and impedances.      The incision was irrigated with antibiotic irrigation. Inverted Vicryl suture was used to approximate the subcutaneous tissues.  Running Monocryl was then used to close the incision.  A Miplex was applied to the incision.    The count was correct at the end of the case and there were no complications.    The patient was transferred to the PACU where they were noted to be hemodynamically stable and neurologically unchanged. The results of surgery were discussed with patient and family.      The system was set to a normal current of 1.25, autostim 1.25 and 1.50 magnet by the device represnatative.    I was present for the entire procedure.    Patient  Disposition:  PACU     SIGNATURE: Charles Pendleton MD  DATE: August 12, 2024  TIME: 2:34 PM

## 2024-08-12 NOTE — PROGRESS NOTES
[facility]  Progress Note  Name: Maira Bull I  MRN: [id number]  [facility] Date of Admission: 8/8/2024   Date of Service: 8/12/2024 I Hospital Day: 4    Assessment & Plan   * Intractable epilepsy with status epilepticus (HCC)  Assessment & Plan  Patient with Lennox Gastuat syndrome and followed by neurology as outpatient  Patient has VNS.  Was recently seen in neurosurgery office for VNS battery exchange but unable to obtain consent.  Patient was sent to the emergency room with persistent seizure from the facility.  Transferred to [facility] for evaluation by neurosurgery and for VNS battery exchange  Neurology following,  Continue anti seizure regimen of Briviact 50 mg BID, Epidiolex 500 mg BID, clobazam 10 mg BID, Vimpat 200 mg BID, Rufinamide 1600 mg BID and Topamax 250 mg BID   Plan for VNS battery exchange today or tomorrow with neurosurgery. Will need epileptology follow up post op to calibrate device.   Started on iv fluids low dose for now    Intellectual disability with epilepsy (HCC)  Assessment & Plan  Currently a resident of a skilled nursing facility.    Nonverbal at baseline  Has PEG tube in place, resume tube feeds when able  Discussed with neurosurgery  -consent for VNS battery replacement obtained.  Await timing of OR either later today or tomorrow  Called facility to ask for patient's tube feed orders.  Postprocedure will continue tube feeds Jevity 1.2 at 52 mL an hour from noon until 8 AM daily patient also has water flushes 60 mL before and after medications    Cerebral anoxic injury (HCC)  Assessment & Plan  History of cerebral anoxic injury   Patient with significant debility, nursing home resident  With resultant intractable seizure  Continue with supportive care    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HCC)  Assessment & Plan  Plan for VNS battery replacement with neurosurgery timing TBD  Last battery change was in 2019, battery now noted to  be near end of life   Additional plan as above                VTE Pharmacologic Prophylaxis: VTE Score: 1  on hold for procedure    Mobility:   Basic Mobility Inpatient Raw Score: 8  JH-HLM Goal: 3: Sit at edge of bed  JH-HLM Achieved: 1: Laying in bed  JH-HLM Goal NOT achieved. Continue with multidisciplinary rounding and encourage appropriate mobility to improve upon JH-HLM goals.    Patient Centered Rounds: I performed bedside rounds with nursing staff today.   Discussions with Specialists or Other Care Team Provider: Neurosurgery, case management    Education and Discussions with Family / Patient: Updated  (brother) via phone.    Total Time Spent on Date of Encounter in care of patient: 35 mins. This time was spent on one or more of the following: performing physical exam; counseling and coordination of care; obtaining or reviewing history; documenting in the medical record; reviewing/ordering tests, medications or procedures; communicating with other healthcare professionals and discussing with patient's family/caregivers.    Current Length of Stay: 4 day(s)  Current Patient Status: Inpatient   Certification Statement: The patient will continue to require additional inpatient hospital stay due to pending VNS battery change  Discharge Plan: Anticipate discharge in 48 hrs to rehab facility.    Code Status: Level 1 - Full Code    Subjective:   Patient nonverbal.  No events overnight per nursing.    Objective:     Vitals:   Temp (24hrs), Av.7 °F (36.5 °C), Min:97.6 °F (36.4 °C), Max:97.7 °F (36.5 °C)    Temp:  [97.6 °F (36.4 °C)-97.7 °F (36.5 °C)] 97.7 °F (36.5 °C)  HR:  [78-89] 78  Resp:  [16-17] 16  BP: (61-95)/(38-60) 87/60  SpO2:  [95 %-96 %] 96 %  Body mass index is 19.12 kg/m².     Input and Output Summary (last 24 hours):     Intake/Output Summary (Last 24 hours) at 2024 1110  Last data filed at 2024 1054  Gross per 24 hour   Intake 0 ml   Output --   Net 0 ml       Physical Exam:    Physical Exam  Cardiovascular:      Rate and Rhythm: Normal rate and regular rhythm.      Heart sounds: No murmur heard.  Pulmonary:      Effort: Pulmonary effort is normal.      Breath sounds: Normal breath sounds.   Abdominal:      General: Bowel sounds are normal. There is no distension.      Palpations: Abdomen is soft.      Tenderness: There is no abdominal tenderness.   Musculoskeletal:      Comments: contractures   Skin:     General: Skin is warm and dry.   Neurological:      Mental Status: She is alert.      Comments: Non verbal          Additional Data:     Labs:  Results from last 7 days   Lab Units 08/12/24  0527   WBC Thousand/uL 7.87   HEMOGLOBIN g/dL 12.5   HEMATOCRIT % 38.2   PLATELETS Thousands/uL 202   SEGS PCT % 50   LYMPHO PCT % 36   MONO PCT % 9   EOS PCT % 4     Results from last 7 days   Lab Units 08/12/24  0527   SODIUM mmol/L 139   POTASSIUM mmol/L 3.8   CHLORIDE mmol/L 111*   CO2 mmol/L 21   BUN mg/dL 14   CREATININE mg/dL 0.40*   ANION GAP mmol/L 7   CALCIUM mg/dL 9.1   ALBUMIN g/dL 3.6   TOTAL BILIRUBIN mg/dL 0.25   ALK PHOS U/L 83   ALT U/L 32   AST U/L 22   GLUCOSE RANDOM mg/dL 91             Results from last 7 days   Lab Units 08/12/24  1054   HEMOGLOBIN A1C % 5.2     Results from last 7 days   Lab Units 08/06/24  1026   LACTIC ACID mmol/L 1.1       Lines/Drains:  Invasive Devices       Peripheral Intravenous Line  Duration             Peripheral IV 08/07/24 Distal;Dorsal (posterior);Right Forearm 5 days    Peripheral IV 08/11/24 Left;Ventral (anterior) Forearm <1 day              Drain  Duration             Gastrostomy/Enterostomy Percutaneous Interventional Gastrostomy 16 Fr. LUQ 52 days                          Imaging: No pertinent imaging reviewed.    Recent Cultures (last 7 days):         Last 24 Hours Medication List:   Current Facility-Administered Medications   Medication Dose Route Frequency Provider Last Rate    acetaminophen  650 mg Per G Tube Q4H PRN Devora Mendez MD       bisacodyl  10 mg Rectal Daily Devora Mendez MD      Brivaracetam  50 mg Per PEG Tube BID Devora Mendez MD      cannabidiol  500 mg Per G Tube BID Devora Mendez MD      ceFAZolin (ANCEF) 1,000 mg in sodium chloride 0.9 % 1,000 mL irrigation bottle   Irrigation Once Jeffrey Farias MD      cefazolin  2,000 mg Intravenous Once Ashley Conklin PA-C Stopped (08/12/24 1034)    cholecalciferol  400 Units Per G Tube Daily Devora Mendez MD      cloBAZam  10 mg Per G Tube BID Devora Mendez MD      lacosamide  200 mg Per G Tube Q12H [facility] Devora Mendez MD      lactated ringers  75 mL/hr Intravenous Continuous Lenka Farris PA-C 75 mL/hr (08/12/24 1054)    LORazepam  1 mg Intravenous Once PRN Pooja Peña DO      rufinamide  1,600 mg Per G Tube Q12H Devora Mendez MD      topiramate  250 mg Per G Tube Q12H [facility] Devora Mendez MD          Today, Patient Was Seen By: Lenka Farris PA-C    **Please Note: This note may have been constructed using a voice recognition system.**

## 2024-08-12 NOTE — PROGRESS NOTES
[facility]  Progress Note  Name: Maira Bull I  MRN: [id number]  Unit/Bed#: [facility] 607-01 I Date of Admission: 2024   Date of Service: 2024 I Hospital Day: 4    Assessment & Plan   * Intractable epilepsy with status epilepticus (HCC)  Assessment & Plan  Intractable seizures with VNS battery at end of life   Hx of Lennox Gastaut w/ seizures controlled by a VNS   Most recent battery change in    VNS Rep Margy with [employer] - [phone]  Presents from her facility with 15-20 witnessed seizures secondary to dying VNS battery   Now seemingly at her baseline with no further witnessed seizures     Imagin/6 CTH: No acute intracranial hemorrhage, mass effect or edema     Plan:   Continue to closely monitor neuro exam   Frequent neuro checks per primary team   Repeat STAT CTH with any acute decline in GCS > 2pts or more in 1hr   Maintain normotensive BP goals, SBP < 160   Ongoing discussions with neurosurgery team in reagrd to timing of VNS battery change   It is possible p[t may be able to be added on today for the OR vs tomorrow depending on timing   Maintain NPO status should pt be able to go to the OR today   SLIM team aware of the above and state the pt is medically cleared for OR today if she can go   Continue aggressive seizure control per neurology and primary team   Continue current AED regimen   Per neurology, after battery change, pt will need VNS interrogation   Hold all AC/AP meds   No reported use at [facility]   DVT ppx: SCDs, hold chem dvt ppx for possible OR today   Medical management per primary team   Pain control per primary team   PT/OT   Social work following for assistance with dispo once medically cleared     Neurosurgery will continue to follow. Please reach out with any further questions or concerns.            Subjective/Objective   Chief Complaint: non-verbal     Subjective: Patient seen and examined this a.m. on rounds.  No acute events  overnight.  Patient remains neurologically and hemodynamically stable/at her baseline.  She remains nonverbal but is moving all 4 extremities equally and attempting to follow commands by wiggling her toes.  Patient appears comfortable in no acute distress.    Objective: Chronically ill-appearing, middle-aged female appearing younger than her stated age.  Chronically contracted.  No acute distress    I/O         08/10 0701  08/11 0700 08/11 0701  08/12 0700 08/12 0701 08/13 0700    P.O.  0 0    NG/GT 60      IV Piggyback       Total Intake(mL/kg) 60 (1.4) 0 (0) 0 (0)    Urine (mL/kg/hr)       Stool       Total Output       Net +60 0 0           Unmeasured Urine Occurrence 3 x 2 x     Unmeasured Stool Occurrence 1 x 2 x           Invasive Devices       Peripheral Intravenous Line  Duration             Peripheral IV 08/07/24 Distal;Dorsal (posterior);Right Forearm 5 days    Peripheral IV 08/11/24 Left;Ventral (anterior) Forearm <1 day              Drain  Duration             Gastrostomy/Enterostomy Percutaneous Interventional Gastrostomy 16 Fr. LUQ 52 days                  Physical Exam:  Vitals: Blood pressure (!) 87/60, pulse 78, temperature 97.7 °F (36.5 °C), resp. rate 16, height 5' (1.524 m), weight 44.4 kg (97 lb 14.4 oz), SpO2 96%.,Body mass index is 19.12 kg/m².    General appearance: alert, appears younger than stated age, no acute distress, comfortable appearing  Head: Normocephalic, without obvious abnormality, atraumatic  Eyes: EOMI, PERRLA  -Tracking appropriately  Neck: supple, symmetrical, trachea midline and NT  Lungs: non labored breathing, no resp distress on room air   Heart: regular heart rate  Neurologic:   - non verbal at baseline   - chronically contracted in all 4 extremities   - opens eyes spont, tracking   - attempting to follow commands by wiggling her toes, inconsistent   - strength appears equal and intact throughout, pushes hands away with good strength   - spont moving all extremities  "  - sensation to painful stimuli appears intact throughout     Lab Results:  Results from last 7 days   Lab Units 08/12/24  0527 08/09/24 0448 08/06/24  1026   WBC Thousand/uL 7.87 7.59 7.33   HEMOGLOBIN g/dL 12.5 12.5 11.8   HEMATOCRIT % 38.2 37.9 35.8   PLATELETS Thousands/uL 202 234 204   SEGS PCT % 50  --  66   MONO PCT % 9  --  8   EOS PCT % 4  --  2     Results from last 7 days   Lab Units 08/12/24  0527 08/11/24  0522 08/10/24  0459 08/09/24 0448 08/07/24  1803 08/06/24  1026   SODIUM mmol/L 139 139 139 138   < > 140   POTASSIUM mmol/L 3.8 3.5 3.5 3.4*   < > 2.8*   CHLORIDE mmol/L 111* 107 109* 110*   < > 110*   CO2 mmol/L 21 22 21 19*   < > 22   BUN mg/dL 14 16 15 12   < > 17   CREATININE mg/dL 0.40* 0.49* 0.49* 0.43*   < > 0.43*   CALCIUM mg/dL 9.1 9.6 9.4 8.8   < > 9.0   ALK PHOS U/L 83  --   --  86  --  79   ALT U/L 32  --   --  33  --  28   AST U/L 22  --   --  23  --  19    < > = values in this interval not displayed.     Results from last 7 days   Lab Units 08/11/24 0522 08/06/24  1026   MAGNESIUM mg/dL 2.3 1.9             No results found for: \"TROPONINT\"  ABG:  Lab Results   Component Value Date    PHART 7.445 12/20/2018    OJG6JPA 41.4 12/20/2018    PO2ART 83.0 12/20/2018    DJR7MGP 27.8 12/20/2018    BEART 3.4 12/20/2018    SOURCE Radial, Left 12/20/2018       Imaging Studies: I have personally reviewed pertinent reports.   and I have personally reviewed pertinent films in PACS  CT head without contrast    Result Date: 8/6/2024  Narrative: CT BRAIN - WITHOUT CONTRAST INDICATION:   breakthrogh seizures. COMPARISON: 1/14/2019; 3/20/2019 TECHNIQUE:  CT examination of the brain was performed.  Multiplanar 2D reformatted images were created from the source data. Radiation dose length product (DLP) for this visit:  982 mGy-cm .  This examination, like all CT scans performed in the [facility], was performed utilizing techniques to minimize radiation dose exposure, including the use of " iterative reconstruction and automated exposure control. IMAGE QUALITY:  Diagnostic. FINDINGS: PARENCHYMA:  No intracranial mass, mass effect or midline shift. No CT signs of acute infarction.  No acute parenchymal hemorrhage. Atherosclerotic calcifications noted. VENTRICLES AND EXTRA-AXIAL SPACES:  Normal for the patient's age. VISUALIZED ORBITS: Normal visualized orbits. PARANASAL SINUSES: Normal visualized paranasal sinuses. CALVARIUM AND EXTRACRANIAL SOFT TISSUES:  Normal.     Impression: No acute intracranial hemorrhage, mass effect or edema. Workstation performed: [id number]     EKG, Pathology, and Other Studies: I have personally reviewed pertinent reports.      VTE Pharmacologic Prophylaxis: Sequential compression device (Venodyne)     VTE Mechanical Prophylaxis: sequential compression device

## 2024-08-12 NOTE — ASSESSMENT & PLAN NOTE
Patient with Lennox Gastuat syndrome and followed by neurology as outpatient  Patient has VNS.  Was recently seen in neurosurgery office for VNS battery exchange but unable to obtain consent.  Patient was sent to the emergency room with persistent seizure from the facility.  Transferred to [facility] for evaluation by neurosurgery and for VNS battery exchange  Neurology following,  Continue anti seizure regimen of Briviact 50 mg BID, Epidiolex 500 mg BID, clobazam 10 mg BID, Vimpat 200 mg BID, Rufinamide 1600 mg BID and Topamax 250 mg BID   Plan for VNS battery exchange today or tomorrow with neurosurgery. Will need epileptology follow up post op to calibrate device.   Started on iv fluids low dose for now

## 2024-08-12 NOTE — ASSESSMENT & PLAN NOTE
Currently a resident of a skilled nursing facility.    Nonverbal at baseline  Has PEG tube in place, resume tube feeds when able  Discussed with neurosurgery  -consent for VNS battery replacement obtained.  Await timing of OR either later today or tomorrow  Called facility to ask for patient's tube feed orders.  Postprocedure will continue tube feeds Jevity 1.2 at 52 mL an hour from noon until 8 AM daily patient also has water flushes 60 mL before and after medications

## 2024-08-12 NOTE — ASSESSMENT & PLAN NOTE
Battery end-of-life vagal nerve stimulator.  Patient has been medically cleared for surgical intervention.    Reviewed her history, physical examination and investigations in detail with her brother, Oneil Bull Jr. her father, who is her POA, is unavailable at present.  We discussed reopening of left chest incision for placement of vagal nerve stimulator implantable pulse generator.  The goal of surgery is to regain therapeutic benefit.  The risks of surgery reviewed in detail.    1.  Risk of IV anesthetic, infection and bleeding.  2.  Risk of failure of therapy and explantation and requirement for additional surgery.    All his questions were answered to his satisfaction.  Written and verbal consent were personally obtained.  Plan to proceed to the OR this afternoon.Rosa Isela representative aware.

## 2024-08-12 NOTE — CASE MANAGEMENT
Case Management Discharge Planning Note    Patient name Maira Bull  Location [facility] 607/[facility] 607-01 MRN [id number]  : 1980 Date 2024       Current Admission Date: 2024  Current Admission Diagnosis:Intractable epilepsy with status epilepticus (HCC)   Patient Active Problem List    Diagnosis Date Noted Date Diagnosed    Fracture of tooth 2024     PEG tube malfunction (HCC) 2020     Labyrinthine disorder 03/10/2020     Intellectual disability with epilepsy (HCC) 12/10/2019     Fatty infiltration of liver 2019     Moderate protein-calorie malnutrition (HCC) 2019     Dislodged gastrostomy tube 2019     S/P placement of VNS (vagus nerve stimulation) device 2019     Breast mass 01/15/2019     Sedated due to multiple medications 01/10/2019     Hypophosphatemia 2018     Right atrial enlargement 10/13/2018     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     Skin breakdown 10/09/2018     Incontinence 2018     Low blood pressure 2018     Somnolence 2018     Intractable epilepsy with status epilepticus (HCC) 2018     Underweight 2017     Labial cyst 2017     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HCC) 2017     Osteoporosis 2013     Onychomycosis 2013     Hyperkeratosis 2013     Cerebral anoxic injury (HCC) 2013       LOS (days): 4  Geometric Mean LOS (GMLOS) (days): 2.7  Days to GMLOS:-0.9     OBJECTIVE:  Risk of Unplanned Readmission Score: 8.6         Current admission status: Inpatient   Preferred Pharmacy:   [facility], [id number], [address] [place], [place]  [address] [facility] [address]  Phone: [phone] Fax: [phone]    [facility] - [place], [place] - [address]  [address]  [address]  [facility] [address]  Phone: [phone] Fax: [phone]    [facility] [place], [place] - [address]  [address] [facility] [place]  [address]  Phone: [phone] Fax: [phone]    [facility], [place] - [address]  [address] [facility] [address]  Phone: [phone] Fax: [phone]    [facility] [place] - [address]  [address]  [place] [address]  Phone: [phone] Fax: [phone]    [id number] - [place] - [address]  [address] [facility] [place] [address]  Phone: [phone] Fax: [phone]    Primary Care Provider: Doe Miller DO    Primary Insurance: MEDICARE  Secondary Insurance: [facility]    DISCHARGE DETAILS:    Other Referral/Resources/Interventions Provided:  Referral Comments: TC to [place] police department regarding possible welfare check on patient's father, Oneil, as he has not responded to any phone calls.  Police stated that patient's father is currently in STR.  Police provided this CM with patient's brother, Oneil Bull , who can be reached at [phone].  TC to BrotherOneil Jr. who verified that patient's father is [facility], and verified father's mobile number (which is correct in patient's chart).  Oneil states that he is patient's only sibling, patient has no offspring, and patient's mother is .  Brother Oneil states he will assist in decision making regarding patient's health, while patient's father is not always reachable.

## 2024-08-13 ENCOUNTER — TELEPHONE (OUTPATIENT)
Dept: NEUROSURGERY | Facility: CLINIC | Age: 44
End: 2024-08-13

## 2024-08-13 VITALS
DIASTOLIC BLOOD PRESSURE: 71 MMHG | BODY MASS INDEX: 19.61 KG/M2 | SYSTOLIC BLOOD PRESSURE: 103 MMHG | TEMPERATURE: 98.6 F | HEART RATE: 82 BPM | WEIGHT: 99.87 LBS | RESPIRATION RATE: 15 BRPM | HEIGHT: 60 IN | OXYGEN SATURATION: 97 %

## 2024-08-13 LAB
ANION GAP SERPL CALCULATED.3IONS-SCNC: 8 MMOL/L (ref 4–13)
APTT PPP: 26 SECONDS (ref 23–34)
BUN SERPL-MCNC: 11 MG/DL (ref 5–25)
CALCIUM SERPL-MCNC: 9.2 MG/DL (ref 8.4–10.2)
CHLORIDE SERPL-SCNC: 110 MMOL/L (ref 96–108)
CO2 SERPL-SCNC: 22 MMOL/L (ref 21–32)
CREAT SERPL-MCNC: 0.48 MG/DL (ref 0.6–1.3)
ERYTHROCYTE [DISTWIDTH] IN BLOOD BY AUTOMATED COUNT: 13.3 % (ref 11.6–15.1)
GFR SERPL CREATININE-BSD FRML MDRD: 120 ML/MIN/1.73SQ M
GLUCOSE SERPL-MCNC: 128 MG/DL (ref 65–140)
HCT VFR BLD AUTO: 38.3 % (ref 34.8–46.1)
HGB BLD-MCNC: 12.5 G/DL (ref 11.5–15.4)
INR PPP: 1.02 (ref 0.85–1.19)
MCH RBC QN AUTO: 32.9 PG (ref 26.8–34.3)
MCHC RBC AUTO-ENTMCNC: 32.6 G/DL (ref 31.4–37.4)
MCV RBC AUTO: 101 FL (ref 82–98)
MRSA NOSE QL CULT: NORMAL
PLATELET # BLD AUTO: 247 THOUSANDS/UL (ref 149–390)
PMV BLD AUTO: 11.7 FL (ref 8.9–12.7)
POTASSIUM SERPL-SCNC: 3.9 MMOL/L (ref 3.5–5.3)
PROTHROMBIN TIME: 13.7 SECONDS (ref 12.3–15)
RBC # BLD AUTO: 3.8 MILLION/UL (ref 3.81–5.12)
SODIUM SERPL-SCNC: 140 MMOL/L (ref 135–147)
WBC # BLD AUTO: 6.54 THOUSAND/UL (ref 4.31–10.16)

## 2024-08-13 PROCEDURE — 99024 POSTOP FOLLOW-UP VISIT: CPT | Performed by: NEUROLOGICAL SURGERY

## 2024-08-13 PROCEDURE — 95977 ALYS CPLX CN NPGT PRGRMG: CPT | Performed by: PSYCHIATRY & NEUROLOGY

## 2024-08-13 PROCEDURE — 85027 COMPLETE CBC AUTOMATED: CPT | Performed by: PHYSICIAN ASSISTANT

## 2024-08-13 PROCEDURE — 80048 BASIC METABOLIC PNL TOTAL CA: CPT | Performed by: PHYSICIAN ASSISTANT

## 2024-08-13 PROCEDURE — 85610 PROTHROMBIN TIME: CPT | Performed by: PHYSICIAN ASSISTANT

## 2024-08-13 PROCEDURE — 85730 THROMBOPLASTIN TIME PARTIAL: CPT | Performed by: PHYSICIAN ASSISTANT

## 2024-08-13 PROCEDURE — 99239 HOSP IP/OBS DSCHRG MGMT >30: CPT | Performed by: PHYSICIAN ASSISTANT

## 2024-08-13 RX ORDER — CEPHALEXIN 250 MG/5ML
500 POWDER, FOR SUSPENSION ORAL EVERY 6 HOURS SCHEDULED
Start: 2024-08-13 | End: 2024-08-15

## 2024-08-13 RX ORDER — DIAZEPAM ORAL SOLUTION (CONCENTRATE) 5 MG/ML
SOLUTION ORAL
Qty: 30 ML | Refills: 0 | Status: SHIPPED | OUTPATIENT
Start: 2024-08-13

## 2024-08-13 RX ORDER — DIAZEPAM ORAL SOLUTION (CONCENTRATE) 5 MG/ML
SOLUTION ORAL
Qty: 30 ML | Refills: 0 | Status: SHIPPED | OUTPATIENT
Start: 2024-08-13 | End: 2024-08-13

## 2024-08-13 RX ADMIN — TOPIRAMATE 250 MG: 100 TABLET, FILM COATED ORAL at 10:03

## 2024-08-13 RX ADMIN — CLOBAZAM 10 MG: 2.5 SUSPENSION ORAL at 10:20

## 2024-08-13 RX ADMIN — BRIVARACETAM 50 MG: 10 SOLUTION ORAL at 12:28

## 2024-08-13 RX ADMIN — CEPHALEXIN 500 MG: 250 FOR SUSPENSION ORAL at 12:28

## 2024-08-13 RX ADMIN — RUFINAMIDE 1600 MG: 200 TABLET, FILM COATED ORAL at 10:02

## 2024-08-13 RX ADMIN — CEFAZOLIN SODIUM 2000 MG: 2 SOLUTION INTRAVENOUS at 10:16

## 2024-08-13 RX ADMIN — CEFAZOLIN SODIUM 2000 MG: 2 SOLUTION INTRAVENOUS at 01:52

## 2024-08-13 RX ADMIN — LACOSAMIDE 200 MG: 10 SOLUTION ORAL at 10:20

## 2024-08-13 RX ADMIN — Medication 500 MG: at 12:27

## 2024-08-13 NOTE — PROGRESS NOTES
Neurology/Epilepsy Procedure Note  VNS Programming / Interrogation     Patient Name: Maira Bull    : 1980   MRN: [id number]   Date of Service: 24   Programming performed/directed by: Kera Davidson MD and Prabhjot Kaiser MD     Maira Bull is a 44 y.o. female w/ LGS admitted after multiple breakthrough seizures in the setting of VNS battery end-of-life. Now POD # 1 s/p VNS battery replacement (re-opening of L chest incision for placement of Sentiva implantable pulse generator for vagal nerve stimulator). Settings were decreased by device representative at the time of the procedure per primary neurologist recommendations .     Increase in settings today was completed in 2 discrete steps and patient was monitored for at least 4 minutes for signs and symptoms related to settings adjustments after each step. She exhibited no signs of distress or change from her neurological baseline throughout observation. Nursing was notified of the changes made.     VNS Settings interrogated today:   Patient ID: BS  Generator SenTiva    ID: [id number]  Implant date: 24    INITIAL INTERROGATION  Output current 1.25 mA   Signal frequency 20 Hz   Pulse width 500 ?sec   On time 30 sec   Off time 1.1 min   Duty Cycle 35 %   Autostim current 1.25 mA   Autostim Pulse width 500 ?sec   Autostim on time 30 sec   Autostim threshold 20 %   Mag current 1.5 mA   Mag pulse width 500 ?sec   Mag on time 60 sec        FINAL SETTINGS  Output current 1.75 mA   Signal frequency 20 Hz   Pulse width 500 ?sec   On time 30 sec   Off time 1.1 min   Duty Cycle 35 %   Autostim current 1.75 mA   Autostim Pulse width 500 ?sec   Autostim on time 30 sec   Autostim threshold 20 %   Mag current 2 mA   Mag pulse width 500 ?sec   Mag on time 60 sec      Battery: %

## 2024-08-13 NOTE — ASSESSMENT & PLAN NOTE
Patient with Lennox Gastuat syndrome and followed by neurology as outpatient  Patient has VNS.  Was recently seen in neurosurgery office for VNS battery exchange but unable to obtain consent.  Patient was sent to the emergency room with persistent seizure from the facility.  Transferred to [facility] for evaluation by neurosurgery and for VNS battery exchange  Neurology following,  Continue anti seizure regimen of Briviact 50 mg BID, Epidiolex 500 mg BID, clobazam 10 mg BID, Vimpat 200 mg BID, Rufinamide 1600 mg BID and Topamax 250 mg BID   S/P VNS battery exchange 8/12 with neurosurgery. epileptology calibrated device 8/13.

## 2024-08-13 NOTE — PHYSICAL THERAPY NOTE
Physical Therapy Screen    Patient Name: Maira Bull    Today's Date: 8/13/2024     Problem List  Principal Problem:    Intractable epilepsy with status epilepticus (HCC)  Active Problems:    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HCC)    Cerebral anoxic injury (HCC)    Intellectual disability with epilepsy (HCC)       Past Medical History  Past Medical History:   Diagnosis Date    ADHD     Anoxic brain damage (HCC)     Autistic disorder     Breakthrough seizure (HCC) 8/1/2019    Dysphagia     Dysphagia, oropharyngeal phase     Gastrostomy tube dependent (HCC)     14 Fr as of 05/19/2020    Hyperammonemia (HCC)     Hyperkeratosis     Hypotension     Intellectual disability     Lennox-Gastaut syndrome with tonic seizures (HCC)     Lethargy     Liver enzyme elevation     Onychomycosis     Osteoporosis     Osteoporosis         Past Surgical History  Past Surgical History:   Procedure Laterality Date    ABDOMINAL SURGERY      CARDIAC PACEMAKER PLACEMENT      vns implant l chest    IR GASTROJEJUNOSTOMY (GJ) TUBE PLACEMENT  12/8/2023    IR GASTROSTOMY (G) TUBE CHECK/CHANGE/REINSERTION/UPSIZE  6/21/2024    IR GASTROSTOMY TUBE PLACEMENT  11/21/2019    IR THORACENTESIS  12/17/2018    JEJUNOSTOMY FEEDING TUBE      history of - most recently, PEG tube    PEG TUBE PLACEMENT      [place] INSJ/RPLCMT CRANIAL NEUROSTIM PULSE GENERATOR Left 12/18/2019    Procedure: REPLACEMENT IMPLANTABLE PULSE GENERATOR FOR VAGAL NERVE STIMULATOR, LEFT CHEST;  Surgeon: Patrick Canales MD;  Location: [facility];  Service: Neurosurgery      PT orders received and chart reviewed. Per chart pt is a resident of [facility] where she is WC bound and dependent for all mobility and care. Plan is for pt to return to [place] at D/C. No skilled acute care PT needs at this time. Will D/C PT.    Arthur La, PT, DPT

## 2024-08-13 NOTE — NUTRITION
08/13/24 1514   Recommendations/Interventions   Recommendations to Provider suggest resuming TF as ordered at [facility] Jevity 1.2 at 52ml/hr x 20 hrs and add 60ml free H20 flush before and after each cycle and add 75ml free H20 Q4hrs for additional fluid needs.to provide 1248 kcal with 58gms Pro. 1302ml total free H20 meeting 100%kcal/pro/fluid needs.

## 2024-08-13 NOTE — CASE MANAGEMENT
Case Management Discharge Planning Note    Patient name Maira Bull  Location [facility] 607/[facility] 607-01 MRN [id number]  : 1980 Date 2024       Current Admission Date: 2024  Current Admission Diagnosis:Intractable epilepsy with status epilepticus (HCC)   Patient Active Problem List    Diagnosis Date Noted Date Diagnosed    Fracture of tooth 2024     PEG tube malfunction (HCC) 2020     Labyrinthine disorder 03/10/2020     Intellectual disability with epilepsy (HCC) 12/10/2019     Fatty infiltration of liver 2019     Moderate protein-calorie malnutrition (HCC) 2019     Dislodged gastrostomy tube 2019     S/P placement of VNS (vagus nerve stimulation) device 2019     Breast mass 01/15/2019     Sedated due to multiple medications 01/10/2019     Hypophosphatemia 2018     Right atrial enlargement 10/13/2018     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     Skin breakdown 10/09/2018     Incontinence 2018     Low blood pressure 2018     Somnolence 2018     Intractable epilepsy with status epilepticus (HCC) 2018     Underweight 2017     Labial cyst 2017     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HCC) 2017     Osteoporosis 2013     Onychomycosis 2013     Hyperkeratosis 2013     Cerebral anoxic injury (HCC) 2013       LOS (days): 5  Geometric Mean LOS (GMLOS) (days): 3.9  Days to GMLOS:-0.9     OBJECTIVE:  Risk of Unplanned Readmission Score: 9.93         Current admission status: Inpatient   Preferred Pharmacy:   [facility], [employer] [id number], [address] [place], [place]  [address] [facility] [address]  Phone: [phone] Fax: [phone]    [facility] - [place], [place] - [address]  [address]  [address]  [facility] [address]  Phone: [phone] Fax: [phone]    [facility] [place], [place] - [address] [facility]  [address] [facility] [place]  [address]  Phone: [phone] Fax: [phone]    [facility] - [place], [place] - [address]  [address] [facility] [place] [address]  Phone: [phone] Fax: [phone]    [facility] [place] - [address]  [address]  [place] [address]  Phone: [phone] Fax: [phone]    [id number] [place] [place] - [address]  [address] [facility] [facility]  [place] [address]  Phone: [phone] Fax: [phone]    Primary Care Provider: Doe Miller DO    Primary Insurance: MEDICARE  Secondary Insurance: [facility]    DISCHARGE DETAILS:    Other Referral/Resources/Interventions Provided:  Referral Comments: This CM contacted Oneil martin (brother) to complete IMM. CM unable to make contact to discuss d/c back to [facility] were pt is in LTC. Voicemail left to return CM call. This CM contacted [facility] via aidin to confirm pt can return and bed availability. [facility] able to accept today.    Treatment Team Recommendation: SNF, Assisted Living  Discharge Destination Plan:: SNF  Transport at Discharge : [facility]  Dispatcher Contacted: Yes  Number/Name of Dispatcher: [facility]  Transported by (Company and Unit #): [facility]  ETA of Transport (Date): 08/13/24  ETA of Transport (Time): 0645     Transfer Mode: Stretcher  Accompanied by: Alone                   Accepting Facility Name, City & State : [facility]  Receiving Facility/Agency Phone Number: [phone]  Facility/Agency Fax Number: [phone]

## 2024-08-13 NOTE — DISCHARGE SUMMARY
[facility]  Discharge- Maira Bull 1980, 44 y.o. female MRN: [id number]  Unit/Bed#: [facility] 607-01 Encounter: [id number]  Primary Care Provider: Doe Miller DO   Date and time admitted to hospital: 8/8/2024  7:12 PM    * Intractable epilepsy with status epilepticus (HCC)  Assessment & Plan  Patient with Lennox Gastuat syndrome and followed by neurology as outpatient  Patient has VNS.  Was recently seen in neurosurgery office for VNS battery exchange but unable to obtain consent.  Patient was sent to the emergency room with persistent seizure from the facility.  Transferred to [facility] for evaluation by neurosurgery and for VNS battery exchange  Neurology following,  Continue anti seizure regimen of Briviact 50 mg BID, Epidiolex 500 mg BID, clobazam 10 mg BID, Vimpat 200 mg BID, Rufinamide 1600 mg BID and Topamax 250 mg BID   S/P VNS battery exchange 8/12 with neurosurgery. epileptology calibrated device 8/13.       Intellectual disability with epilepsy (HCC)  Assessment & Plan  Currently a resident of a skilled nursing facility.    Nonverbal at baseline  Has PEG tube in place, tube feeds resumed  Called facility to ask for patient's tube feed orders.  Postprocedure will continue tube feeds Jevity 1.2 at 52 mL an hour from noon until 8 AM daily patient also has water flushes 60 mL before and after medications    Cerebral anoxic injury (HCC)  Assessment & Plan  History of cerebral anoxic injury   Patient with significant debility, nursing home resident  With resultant intractable seizure  Continue with supportive care    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HCC)  Assessment & Plan  8/12 VNS battery replacement with neurosurgery  Last battery change was in 2019, battery now noted to be near end of life   Additional plan as above       Medical Problems       Resolved Problems  Date Reviewed: 8/13/2024   None       Discharging Physician / Practitioner: Lenka Farris PA-C  PCP:  Doe Miller DO  Admission Date:   Admission Orders (From admission, onward)       Ordered        08/08/24 1952  INPATIENT ADMISSION  Once                          Discharge Date: 08/13/24    Disposition:    Other Skilled Nursing Facility at [place]    Reason for Admission: Seizure    Discharge Diagnoses:   Please see assessment and plan section above for further details regarding discharge diagnoses.     Consultations During Hospital Stay:  Neurology  Neurosurgery    Procedures Performed:   VNS battery change 8/12    CT head  No acute intracranial hemorrhage, mass effect or edema.     Significant Findings / Test Results:   See above    Incidental Findings:   none     Test Results Pending at Discharge (will require follow up):   none     Outpatient Tests Requested:  none    Complications:  none    Hospital Course:      Maira Bull is a 44 y.o. female patient who originally presented to the hospital on 8/8/2024 due to breakthrough seizures.  Patient's vagal nerve stimulator battery was noted to be near end-of-life.  She had to her battery changed to 8/12 per neurosurgery.  It was calibrated by epileptologist on 8/13.  Patient tolerated the procedure well without complications.  She will return to her nursing facility in stable condition        Condition at Discharge: good    Discharge Day Visit / Exam:   Subjective: Patient nonverbal.  No events overnight per nursing.  Vitals: Blood Pressure: 94/59 (08/13/24 0716)  Pulse: 76 (08/13/24 0716)  Temperature: 97.6 °F (36.4 °C) (08/13/24 0716)  Temp Source: Axillary (08/08/24 1928)  Respirations: 16 (08/13/24 0716)  Height: 5' (152.4 cm) (AVELINO - pt nonverbal) (08/08/24 1928)  Weight - Scale: 45.3 kg (99 lb 13.9 oz) (08/13/24 0537)  SpO2: 100 % (08/13/24 0716)  Exam:   Physical Exam  Cardiovascular:      Rate and Rhythm: Normal rate and regular rhythm.      Heart sounds: No murmur heard.  Pulmonary:      Effort: Pulmonary effort is normal.      Breath sounds: Normal  breath sounds.   Abdominal:      General: Bowel sounds are normal. There is no distension.      Palpations: Abdomen is soft.      Tenderness: There is no abdominal tenderness.   Musculoskeletal:      Comments: Contractures   Skin:     General: Skin is warm and dry.   Neurological:      Mental Status: She is alert.      Comments: Nonverbal   Psychiatric:         Mood and Affect: Mood normal.          Discussion with Family: Brother called with update    Medication Adjustments and Discharge Medications:  Discharge Medication List: See after visit summary for reconciled discharge medications.   Medication Dosing Tapers - Please refer to Discharge Medication List for details on any medication dosing tapers (if applicable to patient).   Summary of Medication Adjustments made as a result of this hospitalization: Keflex for 5 days  Medications being temporarily held (include recommended restart time):     Wound Care Recommendations:  When applicable, please see wound care section of After Visit Summary.    Instructions for any Catheters / Lines Present at Discharge (including removal date, if applicable):     Diet Recommendations at Discharge:  Diet -        Diet Orders   (From admission, onward)                 Start     Ordered    08/12/24 1510  Diet Enteral/Parenteral; Tube Feeding No Oral Diet; Jevity 1.2 Avery; Cyclic; 52; 16 hours; Water; Before and After each Bolus  Diet effective now        References:    Adult Nutrition Support Algorithm    RD Therapeutic Diet Order Protocol   Question Answer Comment   Diet Type Enteral/Parenteral    Enteral/Parenteral Tube Feeding No Oral Diet    Tube Feeding Formula: Jevity 1.2 Avery    Bolus/Cyclic/Continuous Cyclic    Tube Feeding Cyclic Rate (mL/hr): 52    Tube Feeding Cyclic: Administer over: 16 hours    Water Flush type: Water    Water flush frequency: Before and After each Bolus    RD to adjust diet per protocol? Yes        08/12/24 1509                    Mobility at time of  Discharge:   Basic Mobility Inpatient Raw Score: 6  JH-HLM Goal: 2: Bed activities/Dependent transfer  JH-HLM Achieved: 1: Laying in bed  HLM Goal NOT achieved. Continue to encourage mobility in post discharge setting.    Goals of Care Discussions:  Code Status at Discharge: Level 1 - Full Code  Goals of care were not discussed during this admission.    Discharge instructions/Information to patient and family:   See after visit summary section titled Discharge Instructions for information provided to patient and family.      Planned Readmission: none      Discharge Statement:  I spent 45 minutes discharging the patient. This time was spent on the day of discharge. I had direct contact with the patient on the day of discharge. Greater than 50% of the total time was spent examining patient, answering all patient questions, arranging and discussing plan of care with patient as well as directly providing post-discharge instructions.  Additional time then spent on discharge activities.    **Please Note: This note may have been constructed using a voice recognition system.**

## 2024-08-13 NOTE — ASSESSMENT & PLAN NOTE
8/12 VNS battery replacement with neurosurgery  Last battery change was in 2019, battery now noted to be near end of life   Additional plan as above

## 2024-08-13 NOTE — TELEPHONE ENCOUNTER
8/15/24 - PT DISCHARGED TO [facility] [phone]  8/26/24 2 WK POV W/NURSE    8/13/24 - PT IN [facility]  8/26/24 2 WK POV W/NURSE

## 2024-08-13 NOTE — PROGRESS NOTES
[facility]  Progress Note  Name: Maira Bull I  MRN: [id number]  [facility] I Date of Admission: 8/8/2024   Date of Service: 8/13/2024 I Hospital Day: 5    Assessment & Plan   * Intractable epilepsy with status epilepticus (HCC)  Assessment & Plan  POD#1 - s/p re-opening of L chest incision for replacement of Sentiva implantable pulse generator for vagal nerve stimulator ( 8/12)   Intractable seizures with VNS battery at end of life   Hx of Lennox Gastaut w/ seizures controlled by a VNS   Most recent battery change in 2019   VNS Rep Margy with [employer] - [phone]  Presents from her facility with 15-20 witnessed seizures secondary to dying VNS battery   Now seemingly at her baseline with no further witnessed seizures     Imaging:   No post-op imaging indicated   8/6 CTH: No acute intracranial hemorrhage, mass effect or edema     Plan:   Continue to closely monitor neuro exam   Frequent neuro checks per primary team   Maintain normotensive BP goals, SBP < 160   No drains   Continue local wound care to incision   Keep dressing in place until POD3   Keep clean and dry   Appt to be made for suture removal in 2 weeks in our office   Complete post-op IV ancef followed by 5 day course of PO keflex for surgical site ppx   Continue aggressive seizure control per neurology and primary team   Continue current AED regimen   Pt to be evaluated by Dr. Kaiser and his team today with epileptology for VNS interrogation   Hold all AC/AP meds   No reported use at [facility]   DVT ppx: SCDs, okay for chem dvt ppx from a nsgy standpoint   Medical management per primary team   Pain control per primary team   PT/OT   Social work following for assistance with dispo once medically cleared     Neurosurgery will sign off at this time. Will follow-up with the pt again in 2 weeks in the office. Please reach out with any further questions or concerns.            Subjective/Objective    Chief Complaint: nonverbal     Subjective: Pt seen and examined this am on sven GARCIAS.  Patient returned to the floor after surgery.  No complications in the OR or overnight.  Patient appears stable and at her baseline this morning.  She appears comfortable and in no acute distress.    Objective: Chronically ill-appearing, middle-age female appearing younger than stated age lying comfortably in bed.    I/O         08/11 0701  08/12 0700 08/12 0701  08/13 0700 08/13 0701  08/14 0700    P.O. 0 0     I.V. (mL/kg)  763.8 (16.9)     NG/GT  335     IV Piggyback  150     Feedings  576     Total Intake(mL/kg) 0 (0) 1824.8 (40.3)     Blood  1     Total Output  1     Net 0 +1823.8            Unmeasured Urine Occurrence 2 x 3 x     Unmeasured Stool Occurrence 2 x            Invasive Devices       Peripheral Intravenous Line  Duration             Peripheral IV 08/11/24 Left;Ventral (anterior) Forearm 1 day              Drain  Duration             Gastrostomy/Enterostomy Percutaneous Interventional Gastrostomy 16 Fr. LUQ 53 days                  Physical Exam:  Vitals: Blood pressure 94/59, pulse 76, temperature 97.6 °F (36.4 °C), resp. rate 16, height 5' (1.524 m), weight 45.3 kg (99 lb 13.9 oz), SpO2 100%.,Body mass index is 19.5 kg/m².    General appearance: alert, appears younger than stated age, no acute distress, comfortable appearing  Head: Normocephalic, without obvious abnormality, atraumatic  Eyes: EOMI, PERRLA  -Tracking appropriately  Neck: supple, symmetrical, trachea midline and NT  Lungs: non labored breathing, no resp distress on room air   Heart: regular heart rate  Chest: L upper chest wall incision is clean, dry, intact with clean dressing. No erythema or edema noted. No drainage.   Neurologic:   - non verbal at baseline   - chronically contracted in all 4 extremities   - opens eyes spont, tracking   - attempting to follow commands by wiggling her toes, inconsistent   - strength appears equal and intact  "throughout, pushes hands away with good strength   - spont moving all extremities   - sensation to painful stimuli appears intact throughout     Lab Results:  Results from last 7 days   Lab Units 08/13/24  0501 08/12/24  0527 08/09/24  0448   WBC Thousand/uL 6.54 7.87 7.59   HEMOGLOBIN g/dL 12.5 12.5 12.5   HEMATOCRIT % 38.3 38.2 37.9   PLATELETS Thousands/uL 247 202 234   SEGS PCT %  --  50  --    MONO PCT %  --  9  --    EOS PCT %  --  4  --      Results from last 7 days   Lab Units 08/13/24  0501 08/12/24  0527 08/11/24  0522 08/10/24  0459 08/09/24  0448   SODIUM mmol/L 140 139 139   < > 138   POTASSIUM mmol/L 3.9 3.8 3.5   < > 3.4*   CHLORIDE mmol/L 110* 111* 107   < > 110*   CO2 mmol/L 22 21 22   < > 19*   BUN mg/dL 11 14 16   < > 12   CREATININE mg/dL 0.48* 0.40* 0.49*   < > 0.43*   CALCIUM mg/dL 9.2 9.1 9.6   < > 8.8   ALK PHOS U/L  --  83  --   --  86   ALT U/L  --  32  --   --  33   AST U/L  --  22  --   --  23    < > = values in this interval not displayed.     Results from last 7 days   Lab Units 08/11/24  0522   MAGNESIUM mg/dL 2.3         Results from last 7 days   Lab Units 08/13/24  0501 08/12/24  1054   INR  1.02 1.04   PTT seconds 26 26     No results found for: \"TROPONINT\"  ABG:  Lab Results   Component Value Date    PHART 7.445 12/20/2018    XZW8IJW 41.4 12/20/2018    PO2ART 83.0 12/20/2018    ZDK4KBU 27.8 12/20/2018    BEART 3.4 12/20/2018    SOURCE Radial, Left 12/20/2018       Imaging Studies: I have personally reviewed pertinent reports.   and I have personally reviewed pertinent films in PACS  - see above     EKG, Pathology, and Other Studies: I have personally reviewed pertinent reports.      VTE Pharmacologic Prophylaxis: Sequential compression device (Venodyne)     VTE Mechanical Prophylaxis: sequential compression device     "

## 2024-08-13 NOTE — TELEPHONE ENCOUNTER
Ashley Conklin PA-C  [facility]  Hi,  Can you please schedule this patient for a 2-week POV for incision check with a nurse?  She had a VNS battery change with Dr. Collins on 8/12/2024.  Therefore she does not require any further follow-up after this 2-week incision check.  She will follow-up with neurology.    Thank you,  Ashley    8/13/24PT STILL INHOSP APT SCHEDUELD WITH NURSE 8/26/24 IN [facility]

## 2024-08-13 NOTE — ASSESSMENT & PLAN NOTE
POD#1 - s/p re-opening of L chest incision for replacement of Sentiva implantable pulse generator for vagal nerve stimulator ( 8/12)   Intractable seizures with VNS battery at end of life   Hx of Lennox Gastaut w/ seizures controlled by a VNS   Most recent battery change in 2019   VNS Rep Margy with [facility] - [phone]  Presents from her facility with 15-20 witnessed seizures secondary to dying VNS battery   Now seemingly at her baseline with no further witnessed seizures     Imaging:   No post-op imaging indicated   8/6 CTH: No acute intracranial hemorrhage, mass effect or edema     Plan:   Continue to closely monitor neuro exam   Frequent neuro checks per primary team   Maintain normotensive BP goals, SBP < 160   No drains   Continue local wound care to incision   Keep dressing in place until POD3   Keep clean and dry   Appt to be made for suture removal in 2 weeks in our office   Complete post-op IV ancef followed by 5 day course of PO keflex for surgical site ppx   Continue aggressive seizure control per neurology and primary team   Continue current AED regimen   Pt to be evaluated by Dr. Kaiser and his team today with epileptology for VNS interrogation   Hold all AC/AP meds   No reported use at [facility]   DVT ppx: SCDs, okay for chem dvt ppx from a nsgy standpoint   Medical management per primary team   Pain control per primary team   PT/OT   Social work following for assistance with dispo once medically cleared     Neurosurgery will sign off at this time. Will follow-up with the pt again in 2 weeks in the office. Please reach out with any further questions or concerns.

## 2024-08-13 NOTE — PLAN OF CARE
Problem: PAIN - ADULT  Goal: Verbalizes/displays adequate comfort level or baseline comfort level  Description: Interventions:  - Encourage patient to monitor pain and request assistance  - Assess pain using appropriate pain scale  - Administer analgesics based on type and severity of pain and evaluate response  - Implement non-pharmacological measures as appropriate and evaluate response  - Consider cultural and social influences on pain and pain management  - Notify physician/advanced practitioner if interventions unsuccessful or patient reports new pain  8/13/2024 1357 by Gracie Murdock RN  Outcome: Adequate for Discharge  8/13/2024 0730 by Gracie Murdock RN  Outcome: Progressing  8/13/2024 0729 by Gracie Murdock RN  Outcome: Progressing     Problem: INFECTION - ADULT  Goal: Absence or prevention of progression during hospitalization  Description: INTERVENTIONS:  - Assess and monitor for signs and symptoms of infection  - Monitor lab/diagnostic results  - Monitor all insertion sites, i.e. indwelling lines, tubes, and drains  - Monitor endotracheal if appropriate and nasal secretions for changes in amount and color  - [place] appropriate cooling/warming therapies per order  - Administer medications as ordered  - Instruct and encourage patient and family to use good hand hygiene technique  - Identify and instruct in appropriate isolation precautions for identified infection/condition  8/13/2024 1357 by Gracie Murdock RN  Outcome: Adequate for Discharge  8/13/2024 0730 by Gracie Murdock RN  Outcome: Progressing  8/13/2024 0729 by Gracie Murdock RN  Outcome: Progressing  Goal: Absence of fever/infection during neutropenic period  Description: INTERVENTIONS:  - Monitor WBC    8/13/2024 1357 by Gracie Murdock RN  Outcome: Adequate for Discharge  8/13/2024 0730 by Gracie Murdock RN  Outcome: Progressing  8/13/2024 0729 by Gracie Murdock RN  Outcome: Progressing     Problem: SAFETY ADULT  Goal:  Patient will remain free of falls  Description: INTERVENTIONS:  - Educate patient/family on patient safety including physical limitations  - Instruct patient to call for assistance with activity   - Consult OT/PT to assist with strengthening/mobility   - Keep Call bell within reach  - Keep bed low and locked with side rails adjusted as appropriate  - Keep care items and personal belongings within reach  - Initiate and maintain comfort rounds  - Make Fall Risk Sign visible to staff  - Offer Toileting every 2 Hours, in advance of need  - Initiate/Maintain bed/chair alarm  - Obtain necessary fall risk management equipment:   - Apply yellow socks and bracelet for high fall risk patients  - Consider moving patient to room near nurses station  8/13/2024 1357 by Gracie Murdock RN  Outcome: Adequate for Discharge  8/13/2024 0730 by Gracie Murdock RN  Outcome: Progressing  8/13/2024 0729 by Gracie Murdock RN  Outcome: Progressing  Goal: Maintain or return to baseline ADL function  Description: INTERVENTIONS:  -  Assess patient's ability to carry out ADLs; assess patient's baseline for ADL function and identify physical deficits which impact ability to perform ADLs (bathing, care of mouth/teeth, toileting, grooming, dressing, etc.)  - Assess/evaluate cause of self-care deficits   - Assess range of motion  - Assess patient's mobility; develop plan if impaired  - Assess patient's need for assistive devices and provide as appropriate  - Encourage maximum independence but intervene and supervise when necessary  - Involve family in performance of ADLs  - Assess for home care needs following discharge   - Consider OT consult to assist with ADL evaluation and planning for discharge  - Provide patient education as appropriate  8/13/2024 1357 by Gracie Murdock RN  Outcome: Adequate for Discharge  8/13/2024 0730 by Gracie Murdock RN  Outcome: Progressing  8/13/2024 0729 by Gracie Murdock RN  Outcome: Progressing  Goal:  Maintains/Returns to pre admission functional level  Description: INTERVENTIONS:  - Perform AM-PAC 6 Click Basic Mobility/ Daily Activity assessment daily.  - Set and communicate daily mobility goal to care team and patient/family/caregiver.   - Collaborate with rehabilitation services on mobility goals if consulted  - Perform Range of Motion 3 times a day.  - Reposition patient every 2 hours.  - Dangle patient 3 times a day  - Stand patient 3 times a day  - Ambulate patient 3 times a day  - Out of bed to chair 3 times a day   - Out of bed for meals 3 times a day  - Out of bed for toileting  - Record patient progress and toleration of activity level   8/13/2024 1357 by Gracie Murdock RN  Outcome: Adequate for Discharge  8/13/2024 0730 by Gracie Murdock RN  Outcome: Progressing  8/13/2024 0729 by Gracie Murdock RN  Outcome: Progressing     Problem: DISCHARGE PLANNING  Goal: Discharge to home or other facility with appropriate resources  Description: INTERVENTIONS:  - Identify barriers to discharge w/patient and caregiver  - Arrange for needed discharge resources and transportation as appropriate  - Identify discharge learning needs (meds, wound care, etc.)  - Arrange for interpretive services to assist at discharge as needed  - Refer to Case Management Department for coordinating discharge planning if the patient needs post-hospital services based on physician/advanced practitioner order or complex needs related to functional status, cognitive ability, or social support system  8/13/2024 1357 by Gracie Murdock RN  Outcome: Adequate for Discharge  8/13/2024 0730 by Gracie Murdock RN  Outcome: Progressing  8/13/2024 0729 by Gracie Murdock RN  Outcome: Progressing     Problem: Knowledge Deficit  Goal: Patient/family/caregiver demonstrates understanding of disease process, treatment plan, medications, and discharge instructions  Description: Complete learning assessment and assess knowledge  base.  Interventions:  - Provide teaching at level of understanding  - Provide teaching via preferred learning methods  8/13/2024 1357 by Gracie Murdock RN  Outcome: Adequate for Discharge  8/13/2024 0730 by Gracie Murdock RN  Outcome: Progressing  8/13/2024 0729 by Gracie Murdock RN  Outcome: Progressing     Problem: Prexisting or High Potential for Compromised Skin Integrity  Goal: Skin integrity is maintained or improved  Description: INTERVENTIONS:  - Identify patients at risk for skin breakdown  - Assess and monitor skin integrity  - Assess and monitor nutrition and hydration status  - Monitor labs   - Assess for incontinence   - Turn and reposition patient  - Assist with mobility/ambulation  - Relieve pressure over bony prominences  - Avoid friction and shearing  - Provide appropriate hygiene as needed including keeping skin clean and dry  - Evaluate need for skin moisturizer/barrier cream  - Collaborate with interdisciplinary team   - Patient/family teaching  - Consider wound care consult   8/13/2024 1357 by Gracie Murdock RN  Outcome: Adequate for Discharge  8/13/2024 0730 by Gracie Murdock RN  Outcome: Progressing  8/13/2024 0729 by Gracie Murdock RN  Outcome: Progressing     Problem: Nutrition/Hydration-ADULT  Goal: Nutrient/Hydration intake appropriate for improving, restoring or maintaining nutritional needs  Description: Monitor and assess patient's nutrition/hydration status for malnutrition. Collaborate with interdisciplinary team and initiate plan and interventions as ordered.  Monitor patient's weight and dietary intake as ordered or per policy. Utilize nutrition screening tool and intervene as necessary. Determine patient's food preferences and provide high-protein, high-caloric foods as appropriate.     INTERVENTIONS:  - Monitor oral intake, urinary output, labs, and treatment plans  - Assess nutrition and hydration status and recommend course of action  - Evaluate amount of meals  eaten  - Assist patient with eating if necessary   - Allow adequate time for meals  - Recommend/ encourage appropriate diets, oral nutritional supplements, and vitamin/mineral supplements  - Order, calculate, and assess calorie counts as needed  - Recommend, monitor, and adjust tube feedings and TPN/PPN based on assessed needs  - Assess need for intravenous fluids  - Provide specific nutrition/hydration education as appropriate  - Include patient/family/caregiver in decisions related to nutrition  8/13/2024 1357 by Gracie Murdock RN  Outcome: Adequate for Discharge  8/13/2024 0730 by Gracie Murdock RN  Outcome: Progressing  8/13/2024 0729 by Gracie Murdock RN  Outcome: Progressing     Problem: NEUROSENSORY - ADULT  Goal: Achieves stable or improved neurological status  Description: INTERVENTIONS  - Monitor and report changes in neurological status  - Monitor vital signs such as temperature, blood pressure, glucose, and any other labs ordered   - Initiate measures to prevent increased intracranial pressure  - Monitor for seizure activity and implement precautions if appropriate      8/13/2024 1357 by Gracie Murdock RN  Outcome: Adequate for Discharge  8/13/2024 0730 by Gracie Murdock RN  Outcome: Progressing  Goal: Remains free of injury related to seizures activity  Description: INTERVENTIONS  - Maintain airway, patient safety  and administer oxygen as ordered  - Monitor patient for seizure activity, document and report duration and description of seizure to physician/advanced practitioner  - If seizure occurs,  ensure patient safety during seizure  - Reorient patient post seizure  - Seizure pads on all 4 side rails  - Instruct patient/family to notify RN of any seizure activity including if an aura is experienced  - Instruct patient/family to call for assistance with activity based on nursing assessment  - Administer anti-seizure medications if ordered    8/13/2024 1357 by Gracie Murdock RN  Outcome:  Adequate for Discharge  8/13/2024 0730 by Gracie Murdock RN  Outcome: Progressing  Goal: Achieves maximal functionality and self care  Description: INTERVENTIONS  - Monitor swallowing and airway patency with patient fatigue and changes in neurological status  - Encourage and assist patient to increase activity and self care.   - Encourage visually impaired, hearing impaired and aphasic patients to use assistive/communication devices  8/13/2024 1357 by Gracie Murdock RN  Outcome: Adequate for Discharge  8/13/2024 0730 by Gracie Murdock RN  Outcome: Progressing

## 2024-08-13 NOTE — ASSESSMENT & PLAN NOTE
Currently a resident of a skilled nursing facility.    Nonverbal at baseline  Has PEG tube in place, tube feeds resumed  Called facility to ask for patient's tube feed orders.  Postprocedure will continue tube feeds Jevity 1.2 at 52 mL an hour from noon until 8 AM daily patient also has water flushes 60 mL before and after medications

## 2024-08-13 NOTE — PLAN OF CARE
Problem: PAIN - ADULT  Goal: Verbalizes/displays adequate comfort level or baseline comfort level  Description: Interventions:  - Encourage patient to monitor pain and request assistance  - Assess pain using appropriate pain scale  - Administer analgesics based on type and severity of pain and evaluate response  - Implement non-pharmacological measures as appropriate and evaluate response  - Consider cultural and social influences on pain and pain management  - Notify physician/advanced practitioner if interventions unsuccessful or patient reports new pain  8/13/2024 0730 by Gracie Murdock RN  Outcome: Progressing  8/13/2024 0729 by Gracie Murdock RN  Outcome: Progressing     Problem: INFECTION - ADULT  Goal: Absence or prevention of progression during hospitalization  Description: INTERVENTIONS:  - Assess and monitor for signs and symptoms of infection  - Monitor lab/diagnostic results  - Monitor all insertion sites, i.e. indwelling lines, tubes, and drains  - Monitor endotracheal if appropriate and nasal secretions for changes in amount and color  - [place] appropriate cooling/warming therapies per order  - Administer medications as ordered  - Instruct and encourage patient and family to use good hand hygiene technique  - Identify and instruct in appropriate isolation precautions for identified infection/condition  8/13/2024 0730 by Gracie Murdock RN  Outcome: Progressing  8/13/2024 0729 by Gracie Murdock RN  Outcome: Progressing  Goal: Absence of fever/infection during neutropenic period  Description: INTERVENTIONS:  - Monitor WBC    8/13/2024 0730 by Gracie Murdock RN  Outcome: Progressing  8/13/2024 0729 by Gracie Murdock RN  Outcome: Progressing     Problem: SAFETY ADULT  Goal: Patient will remain free of falls  Description: INTERVENTIONS:  - Educate patient/family on patient safety including physical limitations  - Instruct patient to call for assistance with activity   - Consult OT/PT to  assist with strengthening/mobility   - Keep Call bell within reach  - Keep bed low and locked with side rails adjusted as appropriate  - Keep care items and personal belongings within reach  - Initiate and maintain comfort rounds  - Make Fall Risk Sign visible to staff  - Offer Toileting every 2 Hours, in advance of need  - Initiate/Maintain bed/chair alarm  - Obtain necessary fall risk management equipment:   - Apply yellow socks and bracelet for high fall risk patients  - Consider moving patient to room near nurses station  8/13/2024 0730 by Gracie Murdock RN  Outcome: Progressing  8/13/2024 0729 by Gracie Murdock RN  Outcome: Progressing  Goal: Maintain or return to baseline ADL function  Description: INTERVENTIONS:  -  Assess patient's ability to carry out ADLs; assess patient's baseline for ADL function and identify physical deficits which impact ability to perform ADLs (bathing, care of mouth/teeth, toileting, grooming, dressing, etc.)  - Assess/evaluate cause of self-care deficits   - Assess range of motion  - Assess patient's mobility; develop plan if impaired  - Assess patient's need for assistive devices and provide as appropriate  - Encourage maximum independence but intervene and supervise when necessary  - Involve family in performance of ADLs  - Assess for home care needs following discharge   - Consider OT consult to assist with ADL evaluation and planning for discharge  - Provide patient education as appropriate  8/13/2024 0730 by Gracie Murdock RN  Outcome: Progressing  8/13/2024 0729 by Gracie Murdock RN  Outcome: Progressing  Goal: Maintains/Returns to pre admission functional level  Description: INTERVENTIONS:  - Perform AM-PAC 6 Click Basic Mobility/ Daily Activity assessment daily.  - Set and communicate daily mobility goal to care team and patient/family/caregiver.   - Collaborate with rehabilitation services on mobility goals if consulted  - Perform Range of Motion 3 times a day.  -  Reposition patient every 2 hours.  - Dangle patient 3 times a day  - Stand patient 3 times a day  - Ambulate patient 3 times a day  - Out of bed to chair 3 times a day   - Out of bed for meals 3 times a day  - Out of bed for toileting  - Record patient progress and toleration of activity level   8/13/2024 0730 by Gracie Murdock RN  Outcome: Progressing  8/13/2024 0729 by Gracie Murdock RN  Outcome: Progressing     Problem: DISCHARGE PLANNING  Goal: Discharge to home or other facility with appropriate resources  Description: INTERVENTIONS:  - Identify barriers to discharge w/patient and caregiver  - Arrange for needed discharge resources and transportation as appropriate  - Identify discharge learning needs (meds, wound care, etc.)  - Arrange for interpretive services to assist at discharge as needed  - Refer to Case Management Department for coordinating discharge planning if the patient needs post-hospital services based on physician/advanced practitioner order or complex needs related to functional status, cognitive ability, or social support system  8/13/2024 0730 by Gracie Murdock RN  Outcome: Progressing  8/13/2024 0729 by Gracie Murdock RN  Outcome: Progressing     Problem: Knowledge Deficit  Goal: Patient/family/caregiver demonstrates understanding of disease process, treatment plan, medications, and discharge instructions  Description: Complete learning assessment and assess knowledge base.  Interventions:  - Provide teaching at level of understanding  - Provide teaching via preferred learning methods  8/13/2024 0730 by Gracie Murdock RN  Outcome: Progressing  8/13/2024 0729 by Gracie Murdock RN  Outcome: Progressing     Problem: Prexisting or High Potential for Compromised Skin Integrity  Goal: Skin integrity is maintained or improved  Description: INTERVENTIONS:  - Identify patients at risk for skin breakdown  - Assess and monitor skin integrity  - Assess and monitor nutrition and hydration  status  - Monitor labs   - Assess for incontinence   - Turn and reposition patient  - Assist with mobility/ambulation  - Relieve pressure over bony prominences  - Avoid friction and shearing  - Provide appropriate hygiene as needed including keeping skin clean and dry  - Evaluate need for skin moisturizer/barrier cream  - Collaborate with interdisciplinary team   - Patient/family teaching  - Consider wound care consult   8/13/2024 0730 by Gracie Murdock RN  Outcome: Progressing  8/13/2024 0729 by Gracie Murdock RN  Outcome: Progressing     Problem: Nutrition/Hydration-ADULT  Goal: Nutrient/Hydration intake appropriate for improving, restoring or maintaining nutritional needs  Description: Monitor and assess patient's nutrition/hydration status for malnutrition. Collaborate with interdisciplinary team and initiate plan and interventions as ordered.  Monitor patient's weight and dietary intake as ordered or per policy. Utilize nutrition screening tool and intervene as necessary. Determine patient's food preferences and provide high-protein, high-caloric foods as appropriate.     INTERVENTIONS:  - Monitor oral intake, urinary output, labs, and treatment plans  - Assess nutrition and hydration status and recommend course of action  - Evaluate amount of meals eaten  - Assist patient with eating if necessary   - Allow adequate time for meals  - Recommend/ encourage appropriate diets, oral nutritional supplements, and vitamin/mineral supplements  - Order, calculate, and assess calorie counts as needed  - Recommend, monitor, and adjust tube feedings and TPN/PPN based on assessed needs  - Assess need for intravenous fluids  - Provide specific nutrition/hydration education as appropriate  - Include patient/family/caregiver in decisions related to nutrition  8/13/2024 0730 by Gracie Murdock RN  Outcome: Progressing  8/13/2024 0729 by Gracie Murdock RN  Outcome: Progressing     Problem: NEUROSENSORY - ADULT  Goal:  Achieves stable or improved neurological status  Description: INTERVENTIONS  - Monitor and report changes in neurological status  - Monitor vital signs such as temperature, blood pressure, glucose, and any other labs ordered   - Initiate measures to prevent increased intracranial pressure  - Monitor for seizure activity and implement precautions if appropriate      Outcome: Progressing  Goal: Remains free of injury related to seizures activity  Description: INTERVENTIONS  - Maintain airway, patient safety  and administer oxygen as ordered  - Monitor patient for seizure activity, document and report duration and description of seizure to physician/advanced practitioner  - If seizure occurs,  ensure patient safety during seizure  - Reorient patient post seizure  - Seizure pads on all 4 side rails  - Instruct patient/family to notify RN of any seizure activity including if an aura is experienced  - Instruct patient/family to call for assistance with activity based on nursing assessment  - Administer anti-seizure medications if ordered    Outcome: Progressing  Goal: Achieves maximal functionality and self care  Description: INTERVENTIONS  - Monitor swallowing and airway patency with patient fatigue and changes in neurological status  - Encourage and assist patient to increase activity and self care.   - Encourage visually impaired, hearing impaired and aphasic patients to use assistive/communication devices  Outcome: Progressing

## 2024-08-14 ENCOUNTER — TELEPHONE (OUTPATIENT)
Dept: NEUROLOGY | Facility: CLINIC | Age: 44
End: 2024-08-14

## 2024-08-14 NOTE — TELEPHONE ENCOUNTER
Patient had VNS battery/generator change 8/12/2024. Settings were adjusted while inpatient by EMU provider. Patient will need follow up visit for settings to be increased again.     Message left at facility for return call to office.     If they call back - patient needs follow up visit in 2 weeks with Lachelle for VNS settings adjustment.

## 2024-08-17 LAB — RUFINAMIDE SERPL-MCNC: 12.2 UG/ML

## 2024-08-20 NOTE — TELEPHONE ENCOUNTER
Patient scheduled with Lachelle in CV location for VNS adjustment.     Office address and appt details provided to facility - [phone]

## 2024-08-25 ENCOUNTER — APPOINTMENT (EMERGENCY)
Dept: RADIOLOGY | Facility: HOSPITAL | Age: 44
End: 2024-08-25
Payer: MEDICARE

## 2024-08-25 ENCOUNTER — HOSPITAL ENCOUNTER (EMERGENCY)
Facility: HOSPITAL | Age: 44
Discharge: HOME | End: 2024-08-25
Attending: EMERGENCY MEDICINE
Payer: MEDICARE

## 2024-08-25 VITALS — HEART RATE: 74 BPM | RESPIRATION RATE: 20 BRPM | TEMPERATURE: 98.1 F | OXYGEN SATURATION: 98 %

## 2024-08-25 DIAGNOSIS — T85.528A DISLODGED GASTROSTOMY TUBE: Primary | ICD-10-CM

## 2024-08-25 PROCEDURE — 43762 RPLC GTUBE NO REVJ TRC: CPT | Performed by: PHYSICIAN ASSISTANT

## 2024-08-25 PROCEDURE — 99282 EMERGENCY DEPT VISIT SF MDM: CPT

## 2024-08-25 PROCEDURE — 99284 EMERGENCY DEPT VISIT MOD MDM: CPT | Performed by: PHYSICIAN ASSISTANT

## 2024-08-25 PROCEDURE — 74018 RADEX ABDOMEN 1 VIEW: CPT

## 2024-08-25 RX ORDER — LIDOCAINE HYDROCHLORIDE 20 MG/ML
1 JELLY TOPICAL ONCE
Status: COMPLETED | OUTPATIENT
Start: 2024-08-25 | End: 2024-08-25

## 2024-08-25 RX ADMIN — LIDOCAINE HYDROCHLORIDE 1 APPLICATION: 20 JELLY TOPICAL at 15:24

## 2024-08-25 NOTE — ED PROVIDER NOTES
History  Chief Complaint   Patient presents with    Feeding Tube Problem     Presents from [facility], EMS reports patient pulled out her feeding tube.      This is a 44-year-old female who is a resident of [facility] who is here for her gastric tube that has become dislodged.  This is unfortunately somewhat of a frequent occurrence for her.  Patient is nonverbal secondary to prior anoxic brain injury many years ago.  According to notation her last tube change was done by IR on  of this year was a 16 French.        Prior to Admission Medications   Prescriptions Last Dose Informant Patient Reported? Taking?   Probiotic Product (ALEXANDER-BID PROBIOTIC PO)  Outside Facility (Specify) Yes No   Si tablet by Per G Tube route daily   VITAMIN D PO  Outside Facility (Specify) Yes No   Sig: Take 1,000 mg by mouth in the morning Given in her G-tube   acetaminophen (TYLENOL) 325 mg tablet  Outside Facility (Specify) Yes No   Sig: Take 650 mg by mouth every 4 (four) hours as needed for mild pain or fever   bisacodyl (DULCOLAX) 10 mg suppository   Yes No   Sig: Insert 10 mg into the rectum daily   brivaracetam (BRIVIACT) 50 MG TABS tablet   No No   Sig: Give 1 tablet via PEG tube q12 hours   cannabidiol (Epidiolex) 100 mg/ml SOLN   No No   Si mL (500 mg total) by Per G Tube route 2 (two) times a day   cloBAZam (ONFI) 10 MG tablet   No No   Sig: Give PEG-tube half a tab in the morning and one tab at bedtime   diazepam (VALIUM) 5 MG/ML solution   No No   Sig: If the patient has a seizure lasting more than 3 minutes then give 1mL by PEG tube, may repeat 1mL if she continues to have seizure for more than 10 minutes.   lacosamide (VIMPAT) 200 mg tablet   No No   Sig: Give 1 tablet via PEG tube q12 hours   rufinamide (BANZEL) 400 mg tablet   No No   Si tablets (1,600 mg total) by Per G Tube route every 12 (twelve) hours   topiramate (TOPAMAX) 50 MG tablet   No No   Si tablets (250 mg total) by  Per G Tube route every 12 (twelve) hours      Facility-Administered Medications: None       Past Medical History:   Diagnosis Date    ADHD     Anoxic brain damage (HCC)     Autistic disorder     Breakthrough seizure (HCC) 8/1/2019    Dysphagia     Dysphagia, oropharyngeal phase     Gastrostomy tube dependent (HCC)     14 Fr as of 05/19/2020    Hyperammonemia (HCC)     Hyperkeratosis     Hypotension     Intellectual disability     Lennox-Gastaut syndrome with tonic seizures (HCC)     Lethargy     Liver enzyme elevation     Onychomycosis     Osteoporosis     Osteoporosis        Past Surgical History:   Procedure Laterality Date    ABDOMINAL SURGERY      CARDIAC PACEMAKER PLACEMENT      vns implant l chest    IR GASTROJEJUNOSTOMY (GJ) TUBE PLACEMENT  12/8/2023    IR GASTROSTOMY (G) TUBE CHECK/CHANGE/REINSERTION/UPSIZE  6/21/2024    IR GASTROSTOMY TUBE PLACEMENT  11/21/2019    IR THORACENTESIS  12/17/2018    JEJUNOSTOMY FEEDING TUBE      history of - most recently, PEG tube    PEG TUBE PLACEMENT      IA INSJ/RPLCMT CRANIAL NEUROSTIM PULSE GENERATOR Left 12/18/2019    Procedure: REPLACEMENT IMPLANTABLE PULSE GENERATOR FOR VAGAL NERVE STIMULATOR, LEFT CHEST;  Surgeon: Patrick Canales MD;  Location: [facility];  Service: Neurosurgery    VAGAL NERVE STIMULATOR (VNS) PLACEMENT Left 8/12/2024    Procedure: Reopening of the left chest incision for placement of implantable pulse generator for vagal nerve stimulator;  Surgeon: Charles Pendleton MD;  Location: [facility];  Service: Neurosurgery       History reviewed. No pertinent family history.  I have reviewed and agree with the history as documented.    E-Cigarette/Vaping    E-Cigarette Use Never User      E-Cigarette/Vaping Substances    Nicotine No     THC No     CBD No     Flavoring No     Other No     Unknown No      Social History     Tobacco Use    Smoking status: Never    Smokeless tobacco: Never   Vaping Use    Vaping status: Never Used   Substance Use Topics    Alcohol use:  Not Currently    Drug use: Not Currently       Review of Systems   Unable to perform ROS: Patient nonverbal       Physical Exam  Physical Exam  Vitals reviewed.   Constitutional:       General: She is not in acute distress.     Appearance: Normal appearance. She is not ill-appearing, toxic-appearing or diaphoretic.   HENT:      Head: Normocephalic and atraumatic.      Right Ear: External ear normal.      Left Ear: External ear normal.   Eyes:      General: No scleral icterus.        Right eye: No discharge.         Left eye: No discharge.      Extraocular Movements: Extraocular movements intact.      Conjunctiva/sclera: Conjunctivae normal.   Cardiovascular:      Rate and Rhythm: Normal rate.      Pulses: Normal pulses.   Pulmonary:      Effort: Pulmonary effort is normal. No respiratory distress.      Breath sounds: No stridor.   Musculoskeletal:         General: No deformity or signs of injury.      Cervical back: Normal range of motion. No rigidity.   Skin:     General: Skin is warm.      Coloration: Skin is not jaundiced.      Findings: No lesion or rash.   Neurological:      Mental Status: She is alert. Mental status is at baseline.         Vital Signs  ED Triage Vitals [08/25/24 1328]   Temperature Pulse Respirations BP SpO2   98.1 °F (36.7 °C) 73 18 -- 98 %      Temp Source Heart Rate Source Patient Position - Orthostatic VS BP Location FiO2 (%)   Temporal Monitor Lying Left arm --      Pain Score       --           Vitals:    08/25/24 1328 08/25/24 1330   Pulse: 73 74   Patient Position - Orthostatic VS: Lying          Visual Acuity      ED Medications  Medications   lidocaine (URO-JET) 2 % urethral/mucosal gel 1 Application (has no administration in time range)       Diagnostic Studies  Results Reviewed       None                   XR abdomen 1 view portable    (Results Pending)              Procedures  Feeding Tube    Date/Time: 8/25/2024 2:20 PM    Performed by: Ned Landry PA-C  Authorized by:  Ned Landry PA-C  Universal Protocol:  Consent given by: Pt chronically nonverbal.  Radiology Images displayed and confirmed. If images not available, report reviewed: imaging studies available  Patient identity confirmed: arm band    Patient location:  Bedside  Pre-procedure details:     Old tube type:  Gastrostomy    Old tube size:  16 Fr  Indications:     Indications: tube dislodged    Anesthesia (see MAR for exact dosages):     Anesthesia method:  Topical application    Topical anesthetic:  Lidocaine gel  Procedure details:     Patient position:  Supine    Procedure type:  Replacement    Tube type:  Gastrostomy    Tube size:  16 Fr    Bulb inflation volume:  6ml    Bulb inflation fluid:  Normal saline  Post-procedure details:     Placement/position confirmation:  X-ray and contrast    Placement difficulty:  None    Bleeding:  None    Patient tolerance of procedure:  Tolerated well, no immediate complications           ED Course  ED Course as of 08/25/24 1425   Sun Aug 25, 2024   1414 Temperature: 98.1 °F (36.7 °C)   1414 Pulse: 73   1414 Respirations: 18   1414 SpO2: 98 %                                 SBIRT 22yo+      Flowsheet Row Most Recent Value   Initial Alcohol Screen: US AUDIT-C     1. How often do you have a drink containing alcohol? 0 Filed at: 08/25/2024 1327   2. How many drinks containing alcohol do you have on a typical day you are drinking?  0 Filed at: 08/25/2024 1327   3a. Male UNDER 65: How often do you have five or more drinks on one occasion? 0 Filed at: 08/25/2024 1327   3b. FEMALE Any Age, or MALE 65+: How often do you have 4 or more drinks on one occassion? 0 Filed at: 08/25/2024 1327   Audit-C Score 0 Filed at: 08/25/2024 1327   SREE: How many times in the past year have you...    Used an illegal drug or used a prescription medication for non-medical reasons? Never Filed at: 08/25/2024 1327                      Medical Decision Making  Patient presents for G-tube dislodgment  via EMS from a local nursing home.  Patient has a history of anoxic brain injury, she is nonverbal she has a chronic feeding tube.  Last replaced by IR on 21 June this was a 16 French tube.  Nursing home did did not mention how long this tube was dislodged.  The diagnosis is clear to be a dislodged tube.  See procedure note for replacement.  She was stable for discharge back to the facility.    Amount and/or Complexity of Data Reviewed  Radiology: ordered.    Risk  Prescription drug management.                 Disposition  Final diagnoses:   Dislodged gastrostomy tube     Time reflects when diagnosis was documented in both MDM as applicable and the Disposition within this note       Time User Action Codes Description Comment    8/25/2024  1:36 PM Ned Landry Add [T85.528A] Dislodged gastrostomy tube           ED Disposition       ED Disposition   Discharge    Condition   Stable    Date/Time   Sun Aug 25, 2024 1419    Comment   Maira Bull discharge to home/self care.                   Follow-up Information       Follow up With Specialties Details Why Contact Info    Doe Miller DO Family Medicine Schedule an appointment as soon as possible for a visit  As needed [address] [place] [address] [phone]              Patient's Medications   Discharge Prescriptions    No medications on file       No discharge procedures on file.    PDMP Review         Value Time User    PDMP Reviewed  Yes 3/22/2024  4:04 PM Quynh España MD            ED Provider  Electronically Signed by             Ned Landry PA-C  08/25/24 [id number]

## 2024-08-29 ENCOUNTER — CLINICAL SUPPORT (OUTPATIENT)
Dept: NEUROSURGERY | Facility: CLINIC | Age: 44
End: 2024-08-29

## 2024-08-29 VITALS — TEMPERATURE: 99.2 F | SYSTOLIC BLOOD PRESSURE: 92 MMHG | DIASTOLIC BLOOD PRESSURE: 60 MMHG

## 2024-08-29 DIAGNOSIS — Z98.890 POST-OPERATIVE STATE: Primary | ICD-10-CM

## 2024-08-29 PROCEDURE — 99024 POSTOP FOLLOW-UP VISIT: CPT | Performed by: NEUROLOGICAL SURGERY

## 2024-08-29 RX ORDER — NITROFURANTOIN 25; 75 MG/1; MG/1
100 CAPSULE ORAL 2 TIMES DAILY
COMMUNITY

## 2024-08-29 RX ORDER — SODIUM PHOSPHATE, DIBASIC AND SODIUM PHOSPHATE, MONOBASIC 3.5; 9.5 G/66ML; G/66ML
1 ENEMA RECTAL ONCE
COMMUNITY

## 2024-08-29 NOTE — PROGRESS NOTES
Post-Op Visit- Neurosurgery    Maira Bull 44 y.o. female MRN: [id number]    Chief Complaint:  Patient presents post: Reopening of the left chest incision for placement of implantable pulse generator for vagal nerve stimulator - Left    History of Present Illness:  Patient presents for 2 week POV for incision check.     Maira arrived accompanied by transport personnel who assisted her to the exam room on a stretcher. Patient is non-verbal but does not appear to be in acute distress.     Assessment:   Vitals:    08/29/24 1302   BP: 92/60   Temp: 99.2 °F (37.3 °C)       Wound Exam: Incision is clean, dry, and in tact, well approximated without edema, or drainage. See below:         Procedure:  Surgical site assessment.    Complications: None.       Discussion/Summary:  Provided wound care instructions to return to facility including observation for s/s infection including: increased erythema, edema, drainage, dehiscence of incision or fever >101.  Should these be observed, they should call the office immediately for further assessment.  They should be cleansing area with mild soap and water and pat dry do not apply any lotions, creams, or ointments, & not to submerge in any water for 4 more weeks.     She is to maintain activity restrictions for about 4 more weeks including no lifting greater than 10 pounds.     Future follow-up with this office will be on an as needed basis. She should follow-up with neurology for programming the system. This appointment is already scheduled.

## 2024-08-29 NOTE — PATIENT INSTRUCTIONS
1. Wash incision gently in the shower. Avoid submerging in tub, hot tub, or pool.  2. Leave incision open to air when ever possible, may cover loosely with gauze and paper tape for comfort. Do not seal incision under bandages.    3. Do not apply any creams, ointments, or lotions directly to incision.   4. Maintain activity restrictions of lifting, pushing, pulling no more than 5-10 pounds.   5. Ambulate as tolerated/able   6. Additional follow-up will be on an as needed basis.  8. Contact the office with any questions or concerns or if incision issues develop.

## 2024-09-11 ENCOUNTER — TELEPHONE (OUTPATIENT)
Age: 44
End: 2024-09-11

## 2024-09-11 NOTE — TELEPHONE ENCOUNTER
alice from the [facility] in [place] called to give update.  states that on 9/6 around 4 pm she was having a series of seizures, lasting appox10-15 sec each and 4-6 min apart. she does not know how many episodes.  they followed pt's seizure protocol and swiped magent and has not had any since.    did not administer diazepam as seizures were only 10-15 secs.   no missed meds, no new meds.  sleeping ok as far as she knows.  she was on antibiotics for UTI but completed this 3 days before seizure.      currently taking   briviact 50mg every 12 hr  banzel 400mg 4 tabs every 12 hr  epidiolex 100mg/ml-5ml bid  lacosamide 200mg every 12 hrs  clobazam 10mg bid-states that this was increased on 8/13-looks like this was increased when she was recently admitted   topamax 50mg 5 tab every 12   diazepam 1ml as needed     [phone] follow prompts for [place] nurses eduard

## 2024-09-11 NOTE — TELEPHONE ENCOUNTER
Returned call to [facility] regarding Li's message below. Spoke with Li who voiced clear understanding. Reviewed patient's upcoming appointment details scheduled for 9-18-24 with Lachelle. Li was appreciating of my call.

## 2024-09-11 NOTE — TELEPHONE ENCOUNTER
Noted, thanks.  It is not uncommon for her to have clusters of seizures, and these actually sound much shorter in duration than prior.  No changes advised.  She has an appt on 9/18 with Lachelle for VNS adjustment

## 2024-09-18 NOTE — TELEPHONE ENCOUNTER
Tried calling [facility] unable to leave msg regarding missed appt with Lachelle Cantu.   Pt should be seen so VNS can be adjusted.

## 2024-09-19 ENCOUNTER — OFFICE VISIT (OUTPATIENT)
Dept: NEUROLOGY | Facility: CLINIC | Age: 44
End: 2024-09-19
Payer: MEDICARE

## 2024-09-19 VITALS — HEART RATE: 64 BPM

## 2024-09-19 DIAGNOSIS — Z96.89 S/P PLACEMENT OF VNS (VAGUS NERVE STIMULATION) DEVICE: ICD-10-CM

## 2024-09-19 DIAGNOSIS — G40.814 INTRACTABLE LENNOX-GASTAUT SYNDROME WITHOUT STATUS EPILEPTICUS (HCC): Primary | ICD-10-CM

## 2024-09-19 PROCEDURE — 95976 ALYS SMPL CN NPGT PRGRMG: CPT | Performed by: NURSE PRACTITIONER

## 2024-09-19 PROCEDURE — 99213 OFFICE O/P EST LOW 20 MIN: CPT | Performed by: NURSE PRACTITIONER

## 2024-09-19 NOTE — PROGRESS NOTES
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 female, with Intractable LGS, frequent tonic seizures (especially during sleep), and intermittent witnessed tonic seizures, and developmental epileptic encephalopathy. Her EEG studies show both generalized onset and potential risk for focal onset seizures.    Diagnosis: Intractable Lennox Gastaut Syndrome    Reason for Continued Antiepileptic Medications: Continued seizures     Plan: Continue current antiepileptic medications at current doses            Call office if she experience any further seizure clusters or more frequent seizures            VNS settings adjusted as below            Follow-up:    She will keep her follow-up appointment with Dr. España on 10/25/24     S/P placement of VNS (vagus nerve stimulation) device  VNS generator was replaced 24.  Settings increased as below, without incident, appears patient is back to her previous presurgical settings.        History of Present Illness   Maira Bull is a 44 y.o. female, with Intractable LGS, frequent tonic seizures (especially during sleep), and intermittent witnessed tonic seizures, and developmental epileptic encephalopathy. Her EEG studies show both generalized onset and potential risk for focal onset seizures. She is presenting to [facility] for follow-up of her seizures and to have her VNS setting adjusted after a recent generator replacement.  She was accompanied only by 2 ambulance transporters.      No additional information was sent with the patient to document any additional seizures. Staff did call the office on 24 reporting a series of seizures on 24. No changes were made to her antiepileptic  medications at that time, as patent has experienced clusters of seizures prior and these seizures were much shorter than previous. Patient was wide awake, did not appear to be sedated from her antiepileptic medications.     Patient had her VNS generator replaced on 8/12/24 to due low battery. Settings increased back to previous settings as below.      VNS Settings  Patient ID: ROGER  Model: [id number] S/N [id number]  Implant Date:8/12/24  Output current   1.75mA increased to  2 mA   Signal frequency    20 Hz   Pulse width  500  ?sec   On time  30  sec   Off time    1.1min   Duty Cycle    35 %   Autostim current    1.75 mA, increased to 2 mA   Autostim Pulse width    500 ?sec   Autostim on time    30sec   Autostim threshold      20 %         Mag current    2.0 mA increased to  2.25  mA   Mag pulse width     500 ?sec   Mag on time     60 sec      Battery: %    Current Antiepileptic Medications:  1. Banzel 400mg give 4 tabs twice a day   Topiramate 250mg tab twice a day   Epidiolex 500mg twice a day  Briviact 10mg/mL 50mg twice a day  Lacosamide 200-200  Onfi 10mg twice a day   Other medications as per Epic      Event/Seizure Semiology:  1.  She was described to have drop attacks or spells with grunting sounds and falling to the floor.   2. Her nurse commented on episodes of spasms or myoclonic jerking of the right arm.   3. Generalized convulsions  4. Tonic seizures of eyes rolling back (mostly during sleep)     Prior AEDs:  Rufinamide  Clobazam (excessive sedation)  Clonazepam  Levetiracetam (unclear if beneficial)  Lamotrigine (unclear if beneficial)  Topiramate (missed doses caused breakthrough convulsive type of seizures)  Valproate (elevated ammonia levels)  Fycompa (excess sedation)  Felbamate (listed as a drug allergy)  Phenobarbital (contributing to sedation)  Epidiolex (seems to help the most)  Brivaracetam  Lacosamide       Prior Evaluation:  Imaging:  CT head 2/21/2013, 9/7/2018  No acute intracranial  pathology     3/20/2019  MRI brain w/wo contrast  Normal MR brain study  Symmetric hippocampal formations     EEGs:  Continuous video EEG monitoring 10/13 - 10/15/2017  Frequent generalized spike/polyspike-slow wave complexes during sleep  At times there are independent right more than left midtemporal spikes and bitemporal spikes     Innumerable generalized tonic seizures during sleep, generalized 1 Hz period discharges, that would become continuous for 30 seconds or generalized rhythmic alpha-beta discharges, associated with patient becoming rigid, tonic posturing with arms elevated and tense with subtle jerking of the upper body, eyes opening with upward deviation.  Ictal patterns:  1 - Seconds of diffuse electrodecrement then paroxysmal fast activity followed by 2Hz spike wave discharges (clinically accompanied by posturing of the arms, then clonic jerking)     Continuous video EEG monitoring 10/18 - 10/25/2017  Diffuse background slowing with bursts of arrhythmic generalized spike wave discharges, with shifting lateralization, and independent left and right temporal spikes  Mild eyelid myoclonia associated with brief bursts of 2-2.5 Hz generalized discharges  Tonic seizures with eelctrodecrement  There were innumerable tonic seizures; however by 10/25/2017 the frequency of these seizures did decrease in frequency.     Continuous video EEG monitoring 12/1-12/5/2017  There are innumerable tonic seizures that are generally less than one minute in duration (30-40 seconds), these consists of subtle eye opening and tonic stiffening of arms, if longer then whole body stiffening with clonic jerking movements. These almost always occur exclusively out of sleep. These consist of 2-2.5 Hz generalized spike-slow wave complexes with generalized attenuation of activity (desynchronization) or paroxysmal fast activity. Per 24 hours count of seizures could be .     12/19/2018 routine study  Frequent recurrent tonic  seizures associated with paroxysmal generalized fast activity then generalized rhythmic discharges associated with eyes upward deviation, and myoclonic/clonic jerking of the upper body, excess beta activity.     6/28-7/3/2019 continuous video EEG monitoring  Frequent clusters of tonic seizures (body rigidity, head flexions, eyes go up with dysconjugate gaze, and clonic activity of the arms for a few seconds), these are associated with GPFA and rhythmic spike-slow waves.  There are also bilateral temporal focal epileptiform discharges      Social History:  Resides at [facility] in [place].     I reviewed Allergies, Medical History, Surgical History,  Family History and problem list as documented in Epic/Care Everywhere.     Review of Systems:  Unable to obtain, patient nonverbal     Objective   Pulse 64      Patient lying comfortably on stretcher  Non verbal, did not follow commands  Face appears symmetrical  Moved all extremities equally, bent bilateral lower extremities at knee to rest bottom of feet on stretcher, puller covers up over her head.       Administrative Statements     I spent a total of 20 min with the patient to monitor her after VNS  setting changes.     Voice recognition software was used in the generation of this note. There may be unintentional errors including grammatical errors, spelling errors, or pronoun errors.

## 2024-09-20 NOTE — ASSESSMENT & PLAN NOTE
VNS generator was replaced 8/12/24.  Settings increased as below, without incident, appears patient is back to her previous presurgical settings.

## 2024-09-20 NOTE — ASSESSMENT & PLAN NOTE
44 y.o. female, with Intractable LGS, frequent tonic seizures (especially during sleep), and intermittent witnessed tonic seizures, and developmental epileptic encephalopathy. Her EEG studies show both generalized onset and potential risk for focal onset seizures.    Diagnosis: Intractable Lennox Gastaut Syndrome    Reason for Continued Antiepileptic Medications: Continued seizures     Plan: Continue current antiepileptic medications at current doses            Call office if she experience any further seizure clusters or more frequent seizures            VNS settings adjusted as below            Follow-up:    She will keep her follow-up appointment with Dr. España on 10/25/24

## 2024-09-23 ENCOUNTER — NURSE TRIAGE (OUTPATIENT)
Age: 44
End: 2024-09-23

## 2024-09-23 ENCOUNTER — APPOINTMENT (EMERGENCY)
Dept: CT IMAGING | Facility: HOSPITAL | Age: 44
DRG: 100 | End: 2024-09-23
Payer: MEDICARE

## 2024-09-23 ENCOUNTER — HOSPITAL ENCOUNTER (INPATIENT)
Facility: HOSPITAL | Age: 44
LOS: 1 days | DRG: 100 | End: 2024-09-25
Attending: EMERGENCY MEDICINE | Admitting: INTERNAL MEDICINE
Payer: MEDICARE

## 2024-09-23 DIAGNOSIS — G40.814 INTRACTABLE LENNOX-GASTAUT SYNDROME WITHOUT STATUS EPILEPTICUS (HCC): ICD-10-CM

## 2024-09-23 DIAGNOSIS — G40.909 EPILEPSY (HCC): ICD-10-CM

## 2024-09-23 DIAGNOSIS — G40.909 RECURRENT SEIZURES (HCC): Primary | ICD-10-CM

## 2024-09-23 LAB
ALBUMIN SERPL BCG-MCNC: 4.7 G/DL (ref 3.5–5)
ALP SERPL-CCNC: 96 U/L (ref 34–104)
ALT SERPL W P-5'-P-CCNC: 28 U/L (ref 7–52)
ANION GAP SERPL CALCULATED.3IONS-SCNC: 10 MMOL/L (ref 4–13)
AST SERPL W P-5'-P-CCNC: 23 U/L (ref 13–39)
BASOPHILS # BLD AUTO: 0.08 THOUSANDS/ΜL (ref 0–0.1)
BASOPHILS NFR BLD AUTO: 1 % (ref 0–1)
BILIRUB SERPL-MCNC: 0.44 MG/DL (ref 0.2–1)
BUN SERPL-MCNC: 17 MG/DL (ref 5–25)
CALCIUM SERPL-MCNC: 10.2 MG/DL (ref 8.4–10.2)
CHLORIDE SERPL-SCNC: 108 MMOL/L (ref 96–108)
CO2 SERPL-SCNC: 21 MMOL/L (ref 21–32)
CREAT SERPL-MCNC: 0.49 MG/DL (ref 0.6–1.3)
EOSINOPHIL # BLD AUTO: 0.06 THOUSAND/ΜL (ref 0–0.61)
EOSINOPHIL NFR BLD AUTO: 1 % (ref 0–6)
ERYTHROCYTE [DISTWIDTH] IN BLOOD BY AUTOMATED COUNT: 12.7 % (ref 11.6–15.1)
GFR SERPL CREATININE-BSD FRML MDRD: 119 ML/MIN/1.73SQ M
GLUCOSE SERPL-MCNC: 122 MG/DL (ref 65–140)
HCT VFR BLD AUTO: 42.3 % (ref 34.8–46.1)
HGB BLD-MCNC: 13.8 G/DL (ref 11.5–15.4)
IMM GRANULOCYTES # BLD AUTO: 0.03 THOUSAND/UL (ref 0–0.2)
IMM GRANULOCYTES NFR BLD AUTO: 0 % (ref 0–2)
LYMPHOCYTES # BLD AUTO: 2.35 THOUSANDS/ΜL (ref 0.6–4.47)
LYMPHOCYTES NFR BLD AUTO: 21 % (ref 14–44)
MCH RBC QN AUTO: 32.2 PG (ref 26.8–34.3)
MCHC RBC AUTO-ENTMCNC: 32.6 G/DL (ref 31.4–37.4)
MCV RBC AUTO: 99 FL (ref 82–98)
MONOCYTES # BLD AUTO: 1.23 THOUSAND/ΜL (ref 0.17–1.22)
MONOCYTES NFR BLD AUTO: 11 % (ref 4–12)
NEUTROPHILS # BLD AUTO: 7.59 THOUSANDS/ΜL (ref 1.85–7.62)
NEUTS SEG NFR BLD AUTO: 66 % (ref 43–75)
NRBC BLD AUTO-RTO: 0 /100 WBCS
PLATELET # BLD AUTO: 212 THOUSANDS/UL (ref 149–390)
PMV BLD AUTO: 12.2 FL (ref 8.9–12.7)
POTASSIUM SERPL-SCNC: 3.6 MMOL/L (ref 3.5–5.3)
PROT SERPL-MCNC: 8.1 G/DL (ref 6.4–8.4)
RBC # BLD AUTO: 4.29 MILLION/UL (ref 3.81–5.12)
SODIUM SERPL-SCNC: 139 MMOL/L (ref 135–147)
WBC # BLD AUTO: 11.34 THOUSAND/UL (ref 4.31–10.16)

## 2024-09-23 PROCEDURE — 70450 CT HEAD/BRAIN W/O DYE: CPT

## 2024-09-23 PROCEDURE — 80235 DRUG ASSAY LACOSAMIDE: CPT | Performed by: EMERGENCY MEDICINE

## 2024-09-23 PROCEDURE — 99285 EMERGENCY DEPT VISIT HI MDM: CPT

## 2024-09-23 PROCEDURE — 93005 ELECTROCARDIOGRAM TRACING: CPT

## 2024-09-23 PROCEDURE — 36415 COLL VENOUS BLD VENIPUNCTURE: CPT | Performed by: EMERGENCY MEDICINE

## 2024-09-23 PROCEDURE — 96374 THER/PROPH/DIAG INJ IV PUSH: CPT

## 2024-09-23 PROCEDURE — 85025 COMPLETE CBC W/AUTO DIFF WBC: CPT | Performed by: EMERGENCY MEDICINE

## 2024-09-23 PROCEDURE — 80299 QUANTITATIVE ASSAY DRUG: CPT | Performed by: EMERGENCY MEDICINE

## 2024-09-23 PROCEDURE — 80201 ASSAY OF TOPIRAMATE: CPT | Performed by: EMERGENCY MEDICINE

## 2024-09-23 PROCEDURE — 80210 DRUG ASSAY RUFINAMIDE: CPT | Performed by: EMERGENCY MEDICINE

## 2024-09-23 PROCEDURE — C9254 INJECTION, LACOSAMIDE: HCPCS | Performed by: EMERGENCY MEDICINE

## 2024-09-23 PROCEDURE — 80053 COMPREHEN METABOLIC PANEL: CPT | Performed by: EMERGENCY MEDICINE

## 2024-09-23 PROCEDURE — 96375 TX/PRO/DX INJ NEW DRUG ADDON: CPT

## 2024-09-23 RX ORDER — TOPIRAMATE SPINKLE 25 MG/1
250 CAPSULE ORAL 2 TIMES DAILY
Status: DISCONTINUED | OUTPATIENT
Start: 2024-09-23 | End: 2024-09-23

## 2024-09-23 RX ORDER — TOPIRAMATE 25 MG/1
TABLET, FILM COATED ORAL
Status: DISPENSED
Start: 2024-09-23 | End: 2024-09-24

## 2024-09-23 RX ORDER — LORAZEPAM 2 MG/ML
2 INJECTION INTRAMUSCULAR ONCE
Status: COMPLETED | OUTPATIENT
Start: 2024-09-23 | End: 2024-09-23

## 2024-09-23 RX ORDER — LACOSAMIDE 10 MG/ML
200 SOLUTION ORAL EVERY 12 HOURS SCHEDULED
Status: DISCONTINUED | OUTPATIENT
Start: 2024-09-23 | End: 2024-09-23

## 2024-09-23 RX ORDER — RUFINAMIDE 200 MG/1
400 TABLET, FILM COATED ORAL 2 TIMES DAILY WITH MEALS
Status: DISCONTINUED | OUTPATIENT
Start: 2024-09-24 | End: 2024-09-24

## 2024-09-23 RX ORDER — LEVETIRACETAM 500 MG/5ML
1000 INJECTION, SOLUTION, CONCENTRATE INTRAVENOUS ONCE
Status: COMPLETED | OUTPATIENT
Start: 2024-09-23 | End: 2024-09-23

## 2024-09-23 RX ORDER — RUFINAMIDE 200 MG/1
400 TABLET, FILM COATED ORAL 2 TIMES DAILY WITH MEALS
Status: DISCONTINUED | OUTPATIENT
Start: 2024-09-23 | End: 2024-09-23

## 2024-09-23 RX ORDER — MIDAZOLAM HYDROCHLORIDE 1 MG/ML
1 INJECTION INTRAMUSCULAR; INTRAVENOUS ONCE
Status: COMPLETED | OUTPATIENT
Start: 2024-09-23 | End: 2024-09-23

## 2024-09-23 RX ORDER — LACOSAMIDE 10 MG/ML
200 INJECTION, SOLUTION INTRAVENOUS EVERY 12 HOURS
Status: DISCONTINUED | OUTPATIENT
Start: 2024-09-23 | End: 2024-09-25 | Stop reason: HOSPADM

## 2024-09-23 RX ORDER — LEVETIRACETAM 500 MG/5ML
500 INJECTION, SOLUTION, CONCENTRATE INTRAVENOUS EVERY 12 HOURS SCHEDULED
Status: DISCONTINUED | OUTPATIENT
Start: 2024-09-24 | End: 2024-09-24

## 2024-09-23 RX ORDER — TOPIRAMATE 100 MG/1
TABLET, FILM COATED ORAL
Status: DISPENSED
Start: 2024-09-23 | End: 2024-09-24

## 2024-09-23 RX ADMIN — TOPIRAMATE 250 MG: 100 TABLET, FILM COATED ORAL at 23:16

## 2024-09-23 RX ADMIN — LEVETIRACETAM 1000 MG: 100 INJECTION, SOLUTION INTRAVENOUS at 22:05

## 2024-09-23 RX ADMIN — LACOSAMIDE 200 MG: 10 INJECTION INTRAVENOUS at 23:21

## 2024-09-23 RX ADMIN — LORAZEPAM 2 MG: 2 INJECTION INTRAMUSCULAR; INTRAVENOUS at 21:28

## 2024-09-23 NOTE — ED PROVIDER NOTES
1. Recurrent seizures (HCC)    2. Epilepsy (HCC)    3. Intractable Lennox-Gastaut syndrome without status epilepticus (HCC)      ED Disposition       ED Disposition   Transfer to Another Facility-In Network    Condition   --    Date/Time   Mon Sep 23, 2024 10:50 PM    Comment   Maira Bull should be transferred out to [facility].               Assessment & Plan       Medical Decision Making  Amount and/or Complexity of Data Reviewed  Labs: ordered.  Radiology: ordered.    Risk  Prescription drug management.      44-year-old female with history of Lennox Gestalt, epilepsy presenting for evaluation of seizures.  Patient is having more frequent seizures over the past few days per nursing facility.  Seen by neurology 4 days ago and had VNS settings changed.  Otherwise no changes to antiepileptics.  Patient had 3 seizures en route for EMS and given Versed.  She is at her baseline upon arrival.  Will obtain labs to evaluate for metabolic abnormality, CT head to evaluate for intracranial mass or bleed.  Will obtain AED levels.  Reassessed patient, she did appear to have several episodes of seizure-like activity, initially resolving within less than 30 seconds without intervention but then did have 1 longer lasting episode, treated with Ativan.  Patient slightly more sedated afterwards, still with some intermittent seizure-like activity.    Spoke with EMU on call Dr. Morillo, initially recommends to increase the briviact to 50 AM and 100 PM for one week and then 100 mg BID thereafter.  We do not have this medication here and so he instead recommends loading with Keppra and starting on Keppra 500 mg twice daily.  Will order patient's home antiepileptics.  Patient appears to have decreasing seizure activity but still difficult to tell given her baseline status.  She does appear to be tracking appropriately when saying her name, does not appear to have tonic activity in her arms or eye deviation.  Given increasing  frequency of seizures, will plan for transfer to [place] for video EEG.               Medications   lacosamide (VIMPAT) injection 200 mg (200 mg Intravenous Given 9/24/24 2316)   topiramate (TOPAMAX) tablet 250 mg (250 mg Oral Given 9/24/24 1803)   topiramate (TOPAMAX) 100 mg tablet **ADS Override Pull** (has no administration in time range)   topiramate (TOPAMAX) 25 mg tablet **ADS Override Pull** (has no administration in time range)   Rufinamide SUSP 1,600 mg (1,600 mg Per PEG Tube Given 9/24/24 2059)   Brivaracetam (BRIVIACT) oral solution 50 mg (50 mg Per PEG Tube Given 9/24/24 2059)   lactated ringers infusion (150 mL/hr Intravenous Restarted 9/24/24 1844)   brivaracetam (BRIVIACT) tablet 50 mg (0 mg Oral Hold 9/24/24 2334)   midazolam (FOR EMS ONLY) (VERSED) 2 mg/2 mL injection 2 mg (0 mg Does not apply Given to EMS 9/23/24 1828)   LORazepam (ATIVAN) injection 2 mg (2 mg Intravenous Given 9/23/24 2128)   levETIRAcetam (KEPPRA) injection 1,000 mg (1,000 mg Intravenous Given 9/23/24 2205)   lactated ringers bolus 500 mL (0 mL Intravenous Stopped 9/24/24 2221)   lactated ringers bolus 1,000 mL (0 mL Intravenous Stopped 9/25/24 0038)   cefTRIAXone (ROCEPHIN) IVPB (premix in dextrose) 1,000 mg 50 mL (1,000 mg Intravenous New Bag 9/25/24 0034)       History of Present Illness       HPI    44-year-old female with history of Lennox Gestalt, epilepsy presenting for evaluation of seizures.  Patient is coming from nursing facility.  Per nursing facility staff, patient has been having increasing seizure frequency in the past few days.  She was seen by neurology 4 days ago and had her vagus nerve stimulator settings changed.  She continues to have frequent seizures so was sent to the emergency department for further evaluation.  Per EMS, patient had 3 seizure activities witnessed by them.  She was given 1.5 mg of Versed with improvement.  Patient is otherwise at her baseline at this time.  They states she returns to  baseline in between episodes.    Review of Systems   Unable to perform ROS: Patient nonverbal           Objective     ED Triage Vitals   Temperature Pulse Blood Pressure Respirations SpO2 Patient Position - Orthostatic VS   09/23/24 1820 09/23/24 1820 09/23/24 1822 09/23/24 1820 09/23/24 1820 09/23/24 1822   98.2 °F (36.8 °C) (!) 107 111/79 16 95 % Lying      Temp Source Heart Rate Source BP Location FiO2 (%) Pain Score    09/23/24 1820 09/23/24 1820 09/23/24 1822 -- --    Temporal Monitor Right arm          Physical Exam  Vitals and nursing note reviewed.   Constitutional:       General: She is not in acute distress.     Appearance: Normal appearance. She is well-developed and normal weight. She is not ill-appearing, toxic-appearing or diaphoretic.   HENT:      Head: Normocephalic and atraumatic.      Right Ear: External ear normal.      Left Ear: External ear normal.      Nose: Nose normal.      Mouth/Throat:      Mouth: Mucous membranes are moist.      Pharynx: Oropharynx is clear.   Eyes:      Extraocular Movements: Extraocular movements intact.      Conjunctiva/sclera: Conjunctivae normal.   Cardiovascular:      Rate and Rhythm: Normal rate and regular rhythm.      Pulses: Normal pulses.      Heart sounds: Normal heart sounds. No murmur heard.     No friction rub. No gallop.   Pulmonary:      Effort: Pulmonary effort is normal. No respiratory distress.      Breath sounds: Normal breath sounds. No wheezing or rales.   Abdominal:      General: There is no distension.      Palpations: Abdomen is soft.      Tenderness: There is no abdominal tenderness. There is no guarding or rebound.   Musculoskeletal:         General: No tenderness.      Cervical back: Neck supple.      Right lower leg: No edema.      Left lower leg: No edema.   Skin:     General: Skin is warm and dry.      Coloration: Skin is not pale.      Findings: No erythema or rash.   Neurological:      General: No focal deficit present.      Mental Status:  She is alert.      Cranial Nerves: No cranial nerve deficit.      Sensory: No sensory deficit.      Motor: No weakness.      Comments: Non verbal (baseline), moving all extremities spontaneously.   Psychiatric:         Mood and Affect: Mood normal.         Behavior: Behavior normal.         Labs Reviewed   CBC AND DIFFERENTIAL - Abnormal       Result Value    WBC 11.34 (*)     RBC 4.29      Hemoglobin 13.8      Hematocrit 42.3      MCV 99 (*)     MCH 32.2      MCHC 32.6      RDW 12.7      MPV 12.2      Platelets 212      nRBC 0      Segmented % 66      Immature Grans % 0      Lymphocytes % 21      Monocytes % 11      Eosinophils Relative 1      Basophils Relative 1      Absolute Neutrophils 7.59      Absolute Immature Grans 0.03      Absolute Lymphocytes 2.35      Absolute Monocytes 1.23 (*)     Eosinophils Absolute 0.06      Basophils Absolute 0.08     COMPREHENSIVE METABOLIC PANEL - Abnormal    Sodium 139      Potassium 3.6      Chloride 108      CO2 21      ANION GAP 10      BUN 17      Creatinine 0.49 (*)     Glucose 122      Calcium 10.2      AST 23      ALT 28      Alkaline Phosphatase 96      Total Protein 8.1      Albumin 4.7      Total Bilirubin 0.44      eGFR 119      Narrative:     National Kidney Disease Foundation guidelines for Chronic Kidney Disease (CKD):     Stage 1 with normal or high GFR (GFR > 90 mL/min/1.73 square meters)    Stage 2 Mild CKD (GFR = 60-89 mL/min/1.73 square meters)    Stage 3A Moderate CKD (GFR = 45-59 mL/min/1.73 square meters)    Stage 3B Moderate CKD (GFR = 30-44 mL/min/1.73 square meters)    Stage 4 Severe CKD (GFR = 15-29 mL/min/1.73 square meters)    Stage 5 End Stage CKD (GFR <15 mL/min/1.73 square meters)  Note: GFR calculation is accurate only with a steady state creatinine   UA W REFLEX TO MICROSCOPIC WITH REFLEX TO CULTURE - Abnormal    Color, UA Yellow      Clarity, UA Slightly Cloudy      Specific Gravity, UA >=1.030      pH, UA 6.0      Leukocytes, UA Small (*)      Nitrite, UA Positive (*)     Protein, UA 30 (1+) (*)     Glucose, UA Negative      Ketones, UA Negative      Urobilinogen, UA <2.0      Bilirubin, UA Negative      Occult Blood, UA Negative     URINE MICROSCOPIC - Abnormal    RBC, UA 0-1      WBC, UA 4-10 (*)     Epithelial Cells Occasional      Bacteria, UA Innumerable (*)    CBC AND DIFFERENTIAL - Abnormal    WBC 12.18 (*)     RBC 3.49 (*)     Hemoglobin 11.4 (*)     Hematocrit 35.4       (*)     MCH 32.7      MCHC 32.2      RDW 12.6      MPV 11.4      Platelets 182      nRBC 0      Segmented % 64      Immature Grans % 0      Lymphocytes % 23      Monocytes % 9      Eosinophils Relative 3      Basophils Relative 1      Absolute Neutrophils 7.90 (*)     Absolute Immature Grans 0.03      Absolute Lymphocytes 2.83      Absolute Monocytes 1.04      Eosinophils Absolute 0.30      Basophils Absolute 0.08     BASIC METABOLIC PANEL - Abnormal    Sodium 138      Potassium 3.5      Chloride 107      CO2 23      ANION GAP 8      BUN 20      Creatinine 0.44 (*)     Glucose 111      Calcium 9.0      eGFR 124      Narrative:     National Kidney Disease Foundation guidelines for Chronic Kidney Disease (CKD):     Stage 1 with normal or high GFR (GFR > 90 mL/min/1.73 square meters)    Stage 2 Mild CKD (GFR = 60-89 mL/min/1.73 square meters)    Stage 3A Moderate CKD (GFR = 45-59 mL/min/1.73 square meters)    Stage 3B Moderate CKD (GFR = 30-44 mL/min/1.73 square meters)    Stage 4 Severe CKD (GFR = 15-29 mL/min/1.73 square meters)    Stage 5 End Stage CKD (GFR <15 mL/min/1.73 square meters)  Note: GFR calculation is accurate only with a steady state creatinine   LACOSAMIDE - Normal    Lacosamide 5.28      Narrative:     Coadministration of carbamazepine and phenytoin (inducers of drugmetabolizing  enzymes) may decrease serum concentrations of lacosamide substantially.  Lacosamide drug concentrations should not be the only means of therapeutic drug  management. The assay  should be used in conjunction with information available from clinical  evaluations and other diagnostic procedures. Clinicians should carefully monitor patients  during therapy and dosage adjustments.     LACTIC ACID, PLASMA (W/REFLEX IF RESULT > 2.0) - Normal    LACTIC ACID 1.8      Narrative:     Result may be elevated if tourniquet was used during collection.   BLOOD CULTURE   BLOOD CULTURE   TOPIRAMATE LEVEL   RUFINAMIDE (BANZEL) LEVEL   CLOBAZAM   PROCALCITONIN TEST     CT head without contrast   Final Interpretation by Lamine Hairtson MD (09/23 1938)      No acute intracranial abnormality.                  Workstation performed: [id number]             ECG 12 Lead Documentation Only    Date/Time: 9/23/2024 6:50 PM    Performed by: Susy Powell MD  Authorized by: Susy Powell MD    Indications / Diagnosis:  Seizure  ECG reviewed by me, the ED Provider: yes    Patient location:  ED  Previous ECG:     Previous ECG:  Compared to current    Similarity:  No change    Comparison to cardiac monitor: Yes    Interpretation:     Interpretation: normal    Rate:     ECG rate:  100    ECG rate assessment: tachycardic    Rhythm:     Rhythm: sinus tachycardia    Ectopy:     Ectopy: none    QRS:     QRS axis:  Normal    QRS intervals:  Normal  Conduction:     Conduction: normal    ST segments:     ST segments:  Normal  T waves:     T waves: normal    CriticalCare Time    Date/Time: 9/24/2024 12:53 AM    Performed by: Susy Powell MD  Authorized by: Susy Powell MD    Critical care provider statement:     Critical care time (minutes):  35    Critical care start time:  9/24/2024 12:53 AM    Critical care end time:  9/24/2024 12:53 AM    Critical care time was exclusive of:  Separately billable procedures and treating other patients and teaching time    Critical care was necessary to treat or prevent imminent or life-threatening deterioration of the following conditions:  CNS failure or compromise    Critical  care was time spent personally by me on the following activities:  Blood draw for specimens, obtaining history from patient or surrogate, development of treatment plan with patient or surrogate, discussions with consultants, evaluation of patient's response to treatment, examination of patient, re-evaluation of patient's condition, ordering and review of radiographic studies, ordering and performing treatments and interventions, ordering and review of laboratory studies and review of old charts    I assumed direction of critical care for this patient from another provider in my specialty: no        ED Medication and Procedure Management   Prior to Admission Medications   Prescriptions Last Dose Informant Patient Reported? Taking?   Probiotic Product (ALEXANDER-BID PROBIOTIC PO)  Outside Facility (Specify) Yes No   Si tablet by Per G Tube route daily   VITAMIN D PO  Outside Facility (Specify) Yes No   Sig: Take 1,000 mg by mouth in the morning Given in her G-tube   acetaminophen (TYLENOL) 325 mg tablet  Outside Facility (Specify) Yes No   Sig: Take 650 mg by mouth every 4 (four) hours as needed for mild pain or fever   bisacodyl (DULCOLAX) 10 mg suppository   Yes No   Sig: Insert 10 mg into the rectum daily   brivaracetam (BRIVIACT) 50 MG TABS tablet   No No   Sig: Give 1 tablet via PEG tube q12 hours   cannabidiol (Epidiolex) 100 mg/ml SOLN   No No   Si mL (500 mg total) by Per G Tube route 2 (two) times a day   cloBAZam (ONFI) 10 MG tablet   No No   Sig: Give PEG-tube half a tab in the morning and one tab at bedtime   Patient taking differently: 10 mg 2 (two) times a day Give PEG-tube half a tab in the morning and one tab at bedtime   diazepam (VALIUM) 5 MG/ML solution   No No   Sig: If the patient has a seizure lasting more than 3 minutes then give 1mL by PEG tube, may repeat 1mL if she continues to have seizure for more than 10 minutes.   lacosamide (VIMPAT) 200 mg tablet   No No   Sig: Give 1 tablet via PEG  tube q12 hours   magnesium hydroxide (MILK OF MAGNESIA) 400 mg/5 mL oral suspension  Outside Facility (Specify) Yes No   Si mL by Per G Tube route daily as needed for constipation   rufinamide (BANZEL) 400 mg tablet   No No   Si tablets (1,600 mg total) by Per G Tube route every 12 (twelve) hours   sodium phosphate (PEDIA-LAX) 3.5-9.5 g 59 mL enema  Outside Facility (Specify) Yes No   Sig: Insert 1 enema into the rectum once   topiramate (TOPAMAX) 50 MG tablet   No No   Si tablets (250 mg total) by Per G Tube route every 12 (twelve) hours      Facility-Administered Medications: None     Patient's Medications   Discharge Prescriptions    No medications on file     No discharge procedures on file.     Susy Powell MD  [phone]

## 2024-09-23 NOTE — TELEPHONE ENCOUNTER
"Inbound call was received from [facility] LPN warm transferred from [place].    [facility] LPN states the Patient has been having seizures from dayshift to nightshift and she has had diazepam on multipe occasions. [facility] clarified this started previous to her appointment 09/19/2024 and afterwards. [facility] states the seizures she has seen have been 1-2 min seizures only about a minute apart back to back.  [facility] states the Patient looks fatigued, \"awful,\" her eyes are glazed, and she looks sickly.    Patient had Diazepam this AM and again before the LPN's shift at 3 pm. LPN also reported Patient also got lorazepam this morning.     [facility] advised to have Patient sent to the ED. EMS called for LPN and LPN answered their questions.     [facility] LPN said she will go to [facility].    Dr. España please be aware and provide any advisement, thank you!    Reason for Disposition   Second seizure occurs on the same day    Answer Assessment - Initial Assessment Questions  1. ONSET: \"How long did the seizure last?\" (Minutes)       Jason has been having 1-2 min long seizures about a minute apart.   2. CONTENT: \"Describe what happened during the seizure. Did the body become stiff? Was there any jerking?\"       Jason's arms and hands come up by her face and they get stiff and they shake. Her eyes are fixated and then she grunts and grinds her teeth. After her seizures she was making \"aggressive sounds,\" yelling and screaming,  no actual words.     3. MEDICATIONS: \"Does the individual take anticonvulsant medications?\" (e.g., yes/no, compliance, any recent changes)It was confirmed Patient takes:    cloBAZam (ONFI) 10 MG tablet 10 mg 2 (two) times a day     cannabidiol (Epidiolex) 100 mg/ml SOLN   5 mL (500 mg total) by Per G Tube route 2 (two) times a day     brivaracetam (BRIVIACT) 50 MG TABS tablet   Give 1 " tablet via PEG tube q12 hours    lacosamide (VIMPAT) 200 mg tablet  Give 1 tablet via PEG tube q12 hours    rufinamide (BANZEL) 400 mg tablet  4 tablets (1,600 mg total) by Per G Tube route every 12 (twelve) hours    topiramate (TOPAMAX) 50 MG tablet  5 tablets (250 mg total) by Per G Tube route every 12 (twelve) hours      diazepam (VALIUM) 5 MG/ML solution   If the patient has a seizure lasting more than 3 minutes then give 1mL by PEG tube, may repeat 1mL if she continues to have seizure for more than 10 minutes.    Protocols used: Seizure-ADULT-OH

## 2024-09-24 PROBLEM — G40.813 INTRACTABLE LENNOX-GASTAUT SYNDROME WITH STATUS EPILEPTICUS (HCC): Status: ACTIVE | Noted: 2018-04-23

## 2024-09-24 LAB
ANION GAP SERPL CALCULATED.3IONS-SCNC: 8 MMOL/L (ref 4–13)
BACTERIA UR QL AUTO: ABNORMAL /HPF
BASOPHILS # BLD AUTO: 0.08 THOUSANDS/ΜL (ref 0–0.1)
BASOPHILS NFR BLD AUTO: 1 % (ref 0–1)
BILIRUB UR QL STRIP: NEGATIVE
BUN SERPL-MCNC: 20 MG/DL (ref 5–25)
CALCIUM SERPL-MCNC: 9 MG/DL (ref 8.4–10.2)
CHLORIDE SERPL-SCNC: 107 MMOL/L (ref 96–108)
CLARITY UR: ABNORMAL
CO2 SERPL-SCNC: 23 MMOL/L (ref 21–32)
COLOR UR: YELLOW
CREAT SERPL-MCNC: 0.44 MG/DL (ref 0.6–1.3)
EOSINOPHIL # BLD AUTO: 0.3 THOUSAND/ΜL (ref 0–0.61)
EOSINOPHIL NFR BLD AUTO: 3 % (ref 0–6)
ERYTHROCYTE [DISTWIDTH] IN BLOOD BY AUTOMATED COUNT: 12.6 % (ref 11.6–15.1)
GFR SERPL CREATININE-BSD FRML MDRD: 124 ML/MIN/1.73SQ M
GLUCOSE SERPL-MCNC: 111 MG/DL (ref 65–140)
GLUCOSE UR STRIP-MCNC: NEGATIVE MG/DL
HCT VFR BLD AUTO: 35.4 % (ref 34.8–46.1)
HGB BLD-MCNC: 11.4 G/DL (ref 11.5–15.4)
HGB UR QL STRIP.AUTO: NEGATIVE
IMM GRANULOCYTES # BLD AUTO: 0.03 THOUSAND/UL (ref 0–0.2)
IMM GRANULOCYTES NFR BLD AUTO: 0 % (ref 0–2)
KETONES UR STRIP-MCNC: NEGATIVE MG/DL
LACOSAMIDE SERPL-MCNC: 5.28 UG/ML (ref 1–20)
LEUKOCYTE ESTERASE UR QL STRIP: ABNORMAL
LYMPHOCYTES # BLD AUTO: 2.83 THOUSANDS/ΜL (ref 0.6–4.47)
LYMPHOCYTES NFR BLD AUTO: 23 % (ref 14–44)
MCH RBC QN AUTO: 32.7 PG (ref 26.8–34.3)
MCHC RBC AUTO-ENTMCNC: 32.2 G/DL (ref 31.4–37.4)
MCV RBC AUTO: 101 FL (ref 82–98)
MONOCYTES # BLD AUTO: 1.04 THOUSAND/ΜL (ref 0.17–1.22)
MONOCYTES NFR BLD AUTO: 9 % (ref 4–12)
NEUTROPHILS # BLD AUTO: 7.9 THOUSANDS/ΜL (ref 1.85–7.62)
NEUTS SEG NFR BLD AUTO: 64 % (ref 43–75)
NITRITE UR QL STRIP: POSITIVE
NON-SQ EPI CELLS URNS QL MICRO: ABNORMAL /HPF
NRBC BLD AUTO-RTO: 0 /100 WBCS
PH UR STRIP.AUTO: 6 [PH]
PLATELET # BLD AUTO: 182 THOUSANDS/UL (ref 149–390)
PMV BLD AUTO: 11.4 FL (ref 8.9–12.7)
POTASSIUM SERPL-SCNC: 3.5 MMOL/L (ref 3.5–5.3)
PROT UR STRIP-MCNC: ABNORMAL MG/DL
RBC # BLD AUTO: 3.49 MILLION/UL (ref 3.81–5.12)
RBC #/AREA URNS AUTO: ABNORMAL /HPF
SODIUM SERPL-SCNC: 138 MMOL/L (ref 135–147)
SP GR UR STRIP.AUTO: >=1.03 (ref 1–1.03)
UROBILINOGEN UR STRIP-ACNC: <2 MG/DL
WBC # BLD AUTO: 12.18 THOUSAND/UL (ref 4.31–10.16)
WBC #/AREA URNS AUTO: ABNORMAL /HPF

## 2024-09-24 PROCEDURE — 96375 TX/PRO/DX INJ NEW DRUG ADDON: CPT

## 2024-09-24 PROCEDURE — 80048 BASIC METABOLIC PNL TOTAL CA: CPT | Performed by: EMERGENCY MEDICINE

## 2024-09-24 PROCEDURE — 99291 CRITICAL CARE FIRST HOUR: CPT | Performed by: EMERGENCY MEDICINE

## 2024-09-24 PROCEDURE — C9254 INJECTION, LACOSAMIDE: HCPCS | Performed by: EMERGENCY MEDICINE

## 2024-09-24 PROCEDURE — 87147 CULTURE TYPE IMMUNOLOGIC: CPT | Performed by: HOSPITALIST

## 2024-09-24 PROCEDURE — 81001 URINALYSIS AUTO W/SCOPE: CPT | Performed by: EMERGENCY MEDICINE

## 2024-09-24 PROCEDURE — 96376 TX/PRO/DX INJ SAME DRUG ADON: CPT

## 2024-09-24 PROCEDURE — 87186 SC STD MICRODIL/AGAR DIL: CPT | Performed by: HOSPITALIST

## 2024-09-24 PROCEDURE — 36415 COLL VENOUS BLD VENIPUNCTURE: CPT | Performed by: EMERGENCY MEDICINE

## 2024-09-24 PROCEDURE — 85025 COMPLETE CBC W/AUTO DIFF WBC: CPT | Performed by: EMERGENCY MEDICINE

## 2024-09-24 PROCEDURE — 96366 THER/PROPH/DIAG IV INF ADDON: CPT

## 2024-09-24 PROCEDURE — 96365 THER/PROPH/DIAG IV INF INIT: CPT

## 2024-09-24 PROCEDURE — 84145 PROCALCITONIN (PCT): CPT | Performed by: HOSPITALIST

## 2024-09-24 PROCEDURE — 96361 HYDRATE IV INFUSION ADD-ON: CPT

## 2024-09-24 PROCEDURE — 87077 CULTURE AEROBIC IDENTIFY: CPT | Performed by: HOSPITALIST

## 2024-09-24 PROCEDURE — 87086 URINE CULTURE/COLONY COUNT: CPT | Performed by: HOSPITALIST

## 2024-09-24 RX ORDER — CEPHALEXIN 250 MG/5ML
500 POWDER, FOR SUSPENSION ORAL EVERY 6 HOURS SCHEDULED
Status: DISCONTINUED | OUTPATIENT
Start: 2024-09-25 | End: 2024-09-24

## 2024-09-24 RX ORDER — SODIUM CHLORIDE, SODIUM LACTATE, POTASSIUM CHLORIDE, CALCIUM CHLORIDE 600; 310; 30; 20 MG/100ML; MG/100ML; MG/100ML; MG/100ML
150 INJECTION, SOLUTION INTRAVENOUS CONTINUOUS
Status: DISCONTINUED | OUTPATIENT
Start: 2024-09-24 | End: 2024-09-25 | Stop reason: HOSPADM

## 2024-09-24 RX ORDER — RUFINAMIDE 40 MG/ML
1600 SUSPENSION ORAL EVERY 12 HOURS SCHEDULED
Status: DISCONTINUED | OUTPATIENT
Start: 2024-09-24 | End: 2024-09-25 | Stop reason: HOSPADM

## 2024-09-24 RX ORDER — CEFTRIAXONE 1 G/50ML
1000 INJECTION, SOLUTION INTRAVENOUS ONCE
Status: COMPLETED | OUTPATIENT
Start: 2024-09-25 | End: 2024-09-25

## 2024-09-24 RX ADMIN — SODIUM CHLORIDE, SODIUM LACTATE, POTASSIUM CHLORIDE, AND CALCIUM CHLORIDE 1000 ML: .6; .31; .03; .02 INJECTION, SOLUTION INTRAVENOUS at 23:21

## 2024-09-24 RX ADMIN — TOPIRAMATE 250 MG: 100 TABLET, FILM COATED ORAL at 09:53

## 2024-09-24 RX ADMIN — RUFINAMIDE 1600 MG: 40 SUSPENSION ORAL at 10:02

## 2024-09-24 RX ADMIN — RUFINAMIDE 1600 MG: 40 SUSPENSION ORAL at 20:59

## 2024-09-24 RX ADMIN — SODIUM CHLORIDE, SODIUM LACTATE, POTASSIUM CHLORIDE, AND CALCIUM CHLORIDE 500 ML: .6; .31; .03; .02 INJECTION, SOLUTION INTRAVENOUS at 21:40

## 2024-09-24 RX ADMIN — CEPHALEXIN 500 MG: 250 FOR SUSPENSION ORAL at 23:33

## 2024-09-24 RX ADMIN — LACOSAMIDE 200 MG: 10 INJECTION INTRAVENOUS at 23:16

## 2024-09-24 RX ADMIN — BRIVARACETAM 50 MG: 10 SOLUTION ORAL at 20:59

## 2024-09-24 RX ADMIN — TOPIRAMATE 250 MG: 100 TABLET, FILM COATED ORAL at 18:03

## 2024-09-24 RX ADMIN — LACOSAMIDE 200 MG: 10 INJECTION INTRAVENOUS at 11:46

## 2024-09-24 RX ADMIN — BRIVARACETAM 50 MG: 10 SOLUTION ORAL at 10:10

## 2024-09-24 RX ADMIN — SODIUM CHLORIDE, SODIUM LACTATE, POTASSIUM CHLORIDE, AND CALCIUM CHLORIDE 150 ML/HR: .6; .31; .03; .02 INJECTION, SOLUTION INTRAVENOUS at 15:04

## 2024-09-24 NOTE — ASSESSMENT & PLAN NOTE
-History of cerebral anoxic injury and essentially nonverbal at baseline, patient with significant debility and is nursing home resident. Complicated by intractable seizure with plan for this as per the primary problem    PLAN  -Continue supportive care

## 2024-09-24 NOTE — EMTALA/ACUTE CARE TRANSFER
[facility] EMERGENCY DEPARTMENT  [address] [address]  Dept: [phone]      EMTALA TRANSFER CONSENT    NAME Maira Bull                                         1980                              MRN [id number]    I have been informed of my rights regarding examination, treatment, and transfer   by Dr. Susy Powell MD    Benefits: Specialized equipment and/or services available at the receiving facility (Include comment)________________________ (EMU)    Risks: Potential for delay in receiving treatment, Potential deterioration of medical condition, Loss of IV, Possible worsening of condition or death during transfer, Increased discomfort during transfer      Consent for Transfer:  I acknowledge that my medical condition has been evaluated and explained to me by the emergency department physician or other qualified medical person and/or my attending physician, who has recommended that I be transferred to the service of  Accepting Physician: Dr. Delgado at Accepting Facility Name, City & State : [facility]. The above potential benefits of such transfer, the potential risks associated with such transfer, and the probable risks of not being transferred have been explained to me, and I fully understand them.  The doctor has explained that, in my case, the benefits of transfer outweigh the risks.  I agree to be transferred.    I authorize the performance of emergency medical procedures and treatments upon me in both transit and upon arrival at the receiving facility.  Additionally, I authorize the release of any and all medical records to the receiving facility and request they be transported with me, if possible.  I understand that the safest mode of transportation during a medical emergency is an ambulance and that the Hospital advocates the use of this mode of transport. Risks of traveling to the receiving facility by car, including absence of medical control, life  sustaining equipment, such as oxygen, and medical personnel has been explained to me and I fully understand them.    (THOMAS CORRECT BOX BELOW)  [  ]  I consent to the stated transfer and to be transported by ambulance/helicopter.  [  ]  I consent to the stated transfer, but refuse transportation by ambulance and accept full responsibility for my transportation by car.  I understand the risks of non-ambulance transfers and I exonerate the Hospital and its staff from any deterioration in my condition that results from this refusal.    X___________________________________________    DATE  24  TIME________  Signature of patient or legally responsible individual signing on patient behalf           RELATIONSHIP TO PATIENT_________________________          Provider Certification    NAME Maira Bull                                        [facility] 1980                              MRN [id number]    A medical screening exam was performed on the above named patient.  Based on the examination:    Condition Necessitating Transfer The primary encounter diagnosis was Recurrent seizures (HCC). A diagnosis of Epilepsy (HCC) was also pertinent to this visit.    Patient Condition: The patient has been stabilized such that within reasonable medical probability, no material deterioration of the patient condition or the condition of the unborn child(aki) is likely to result from the transfer    Reason for Transfer: Level of Care needed not available at this facility (EMU)    Transfer Requirements: Facility [facility]   Space available and qualified personnel available for treatment as acknowledged by PACS  Agreed to accept transfer and to provide appropriate medical treatment as acknowledged by       Dr. Delgado  Appropriate medical records of the examination and treatment of the patient are provided at the time of transfer   STAFF INITIAL WHEN COMPLETED _______  Transfer will be performed by qualified personnel from [facility]  and appropriate transfer equipment as required, including the use of necessary and appropriate life support measures.    Provider Certification: I have examined the patient and explained the following risks and benefits of being transferred/refusing transfer to the patient/family:  General risk, such as traffic hazards, adverse weather conditions, rough terrain or turbulence, possible failure of equipment (including vehicle or aircraft), or consequences of actions of persons outside the control of the transport personnel, Unanticipated needs of medical equipment and personnel during transport, Risk of worsening condition, The possibility of a transport vehicle being unavailable      Based on these reasonable risks and benefits to the patient and/or the unborn child(aki), and based upon the information available at the time of the patient’s examination, I certify that the medical benefits reasonably to be expected from the provision of appropriate medical treatments at another medical facility outweigh the increasing risks, if any, to the individual’s medical condition, and in the case of labor to the unborn child, from effecting the transfer.    X____________________________________________ DATE 09/23/24        TIME_______      ORIGINAL - SEND TO MEDICAL RECORDS   COPY - SEND WITH PATIENT DURING TRANSFER

## 2024-09-24 NOTE — PROGRESS NOTES
[facility] Progress Note  Patient: Valentin Mon 40 y o  female   MRN: [id number]  PCP: Javed Redmond DO  [facility] Encounter: [id number]  Date Of Visit: 10/11/17    Assessment:    Principal Problem:    HCAP (healthcare-associated pneumonia)  Active Problems:    Sepsis ([facility] [address] )    Seizure ([facility] [address] )    Dysphagia    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[facility] [address] )      Plan:    · 1  Sepsis-possible gram negative sepsis secondary to HCAP- on vancomycin and cefepime  · 2  H/o of seizure disorder- epilepsy want to put patient on vEEG  · 3  Dysphagia- has PEG  · 4   Lennox gastuat syndrome- on rufinamide  Neurology following  · 5  Hypotension- on IVF      VTE Pharmacologic Prophylaxis:   Pharmacologic: Enoxaparin (Lovenox)  Mechanical VTE Prophylaxis in Place: Yes    Patient Centered Rounds: I have performed bedside rounds with nursing staff today  Discussions with Specialists or Other Care Team Provider:     Education and Discussions with Family / Patient:     Time Spent for Care: 20 minutes  More than 50% of total time spent on counseling and coordination of care as described above  Current Length of Stay: 1 day(s)    Current Patient Status: Inpatient   Certification Statement: The patient will continue to require additional inpatient hospital stay due to pneumonia, seizure    Discharge Plan / Estimated Discharge Date: once pneumonia resolves    Code Status: Level 1 - Full Code      Subjective:   Patient seen and examined at bedside  Patient nonverbal     Objective:     Vitals:   Temp (24hrs), Av 7 °F (36 5 °C), Min:96 1 °F (35 6 °C), Max:99 6 °F (37 6 °C)    HR:  [80-97] 87  Resp:  [17-18] 18  BP: ()/(50-65) 128/65  SpO2:  [95 %-99 %] 95 %  Body mass index is 17 56 kg/m²  Input and Output Summary (last 24 hours):        Intake/Output Summary (Last 24 hours) at 10/11/17 1349  Last data filed at 10/11/17 1302   Gross per 24 hour   Intake             3500 ml   Output             2170 ml Patient calling to ask if she can remain on her Sertraline now that she found out she is pregnant. Patient advised she can continue taking as prescribed.    Pt in agreement and reports understanding.    Betahny MONIQUE RN   [facility]      Net             1330 ml       Physical Exam:     Physical Exam   Constitutional: She appears well-developed and well-nourished  HENT:   Head: Normocephalic and atraumatic  Eyes: Conjunctivae and EOM are normal  Pupils are equal, round, and reactive to light  Neck: Normal range of motion  Neck supple  No JVD present  No tracheal deviation present  No thyromegaly present  Cardiovascular: Normal rate, regular rhythm and normal heart sounds  Exam reveals no gallop and no friction rub  No murmur heard  Pulmonary/Chest: Effort normal and breath sounds normal  No respiratory distress  She has no wheezes  She has no rales  Abdominal: Soft  Bowel sounds are normal  She exhibits no distension  There is no tenderness  There is no rebound  +PEG   Musculoskeletal:   contracted   Neurological: She is alert  Vitals reviewed  Additional Data:     Labs:      Results from last 7 days  Lab Units 10/11/17  0629 10/10/17  0423   WBC Thousand/uL 9 98 25 23*   HEMOGLOBIN g/dL 10 7* 13 0   HEMATOCRIT % 32 5* 39 7   PLATELETS Thousands/uL 129* 154   NEUTROS PCT %  --  85*   LYMPHS PCT %  --  7*   MONOS PCT %  --  8   EOS PCT %  --  0       Results from last 7 days  Lab Units 10/11/17  0629 10/10/17  0423   SODIUM mmol/L 143 142   POTASSIUM mmol/L 3 6 3 7   CHLORIDE mmol/L 113* 107   CO2 mmol/L 23 20*   BUN mg/dL 4* 16   CREATININE mg/dL 0 31* 0 60   CALCIUM mg/dL 8 0* 8 5   TOTAL PROTEIN g/dL  --  6 8   BILIRUBIN TOTAL mg/dL  --  0 20   ALK PHOS U/L  --  93   ALT U/L  --  34   AST U/L  --  22   GLUCOSE RANDOM mg/dL 83 141*       Results from last 7 days  Lab Units 10/10/17  0423   INR  1 05       * I Have Reviewed All Lab Data Listed Above  * Additional Pertinent Lab Tests Reviewed:  [facility] Admission Reviewed    Imaging:    Imaging Reports Reviewed Today Include:   Imaging Personally Reviewed by Myself Includes:      Recent Cultures (last 7 days):       Results from last 7 days  Lab Units 10/10/17  0423   BLOOD CULTURE  No Growth at 24 hrs  No Growth at 24 hrs  Last 24 Hours Medication List:     artificial tear 1 application Ophthalmic TID   cefepime 2,000 mg Intravenous Q12H   enoxaparin 40 mg Subcutaneous Daily   lactulose 20 g Per PEG Tube BID   lamoTRIgine 200 mg Per G Tube BID   levOCARNitine 100 mg Oral TID   multivitamin-minerals 1 tablet Per G Tube Daily   NON FORMULARY 20 mg Per PEG Tube BID   ofloxacin 1 drop Both Eyes 4x Daily   rufinamide 1,600 mg Per G Tube BID   topiramate 250 mg Per G Tube BID   Valproic Acid 250 mg Per G Tube Q8H   vancomycin 15 mg/kg Intravenous Q12H        Today, Patient Was Seen By: Susannah Toro MD    ** Please Note: This note has been constructed using a voice recognition system   **

## 2024-09-24 NOTE — ASSESSMENT & PLAN NOTE
-Patient with anoxic assault syndrome following with [facility] neurology as outpatient additionally with recent VNS battery exchange here 8/12/2024 with recent setting changes by neurology 9/19/2024.    -CT head 9/23/24 performed in ED unremarkable   -CXR 9/25/24 unremarkable  -Normal lacosamide levels  -home on cannabinoid 500 mg twice daily   -It is possible that her breakthrough seizures were triggered by her UTI    PLAN  -Continue home medications brivaracetam 50mg every 12 hours, clobazam 5 mg in a.m. and 10 mg at bedtime, lacosamide 200 mg twice daily, rufinamide 1600 mg twice daily, topiramate 250 mg twice daily.  Defer adjustment of antiepileptics to neurology service  -Seizure precautions  -As needed benzodiazepine for any breakthrough seizure activity lasting greater than 3 minutes and will alert neurology if this should occur  -Neurology consult   -Treat UTI  -Follow-up topiramate, rufinamide, and clobazam levels

## 2024-09-24 NOTE — TELEPHONE ENCOUNTER
Bambi Samuel MD   9/23/24  7:49 PM  Hi, I am covering for Dr. España this afternoon and tomorrow.    Can you let them know, that this patient will have a tendency to cluster because of her Lennox-Gastaut. If the seizures have calmed down at this point, they can follow up at the next appointment. However, if the patient is looking sickly and ill, I would advise them to have her evaluated at an emergency room or urgent care, or at least her primary care to make sure there is not an underlying infection and check lab work.  --------------------------------------    Bambi Samuel MD please be aware Patient did go to ED last night and it appears they are still admitted at this time. Thank you!

## 2024-09-24 NOTE — ED NOTES
Tube feed started at 1600. 1.2 Jevity infusing at 52mL/hr, flush 75mL q 4hrs. Due to be stopped in 20 hours at 1200 on 9/25.     Hodan Manning RN  09/24/24 5526

## 2024-09-25 ENCOUNTER — APPOINTMENT (INPATIENT)
Dept: RADIOLOGY | Facility: HOSPITAL | Age: 44
DRG: 100 | End: 2024-09-25
Payer: MEDICARE

## 2024-09-25 ENCOUNTER — HOSPITAL ENCOUNTER (INPATIENT)
Facility: HOSPITAL | Age: 44
LOS: 6 days | Discharge: NURSING, REHAB OR LONG-TERM CARE | DRG: 101 | End: 2024-10-01
Attending: INTERNAL MEDICINE | Admitting: FAMILY MEDICINE
Payer: MEDICARE

## 2024-09-25 VITALS
DIASTOLIC BLOOD PRESSURE: 62 MMHG | HEART RATE: 78 BPM | WEIGHT: 92.81 LBS | HEIGHT: 60 IN | TEMPERATURE: 97.7 F | OXYGEN SATURATION: 97 % | SYSTOLIC BLOOD PRESSURE: 90 MMHG | RESPIRATION RATE: 29 BRPM | BODY MASS INDEX: 18.22 KG/M2

## 2024-09-25 DIAGNOSIS — G40.814 INTRACTABLE LENNOX-GASTAUT SYNDROME WITHOUT STATUS EPILEPTICUS (HCC): ICD-10-CM

## 2024-09-25 DIAGNOSIS — F79 INTELLECTUAL DISABILITY WITH EPILEPSY (HCC): Primary | ICD-10-CM

## 2024-09-25 DIAGNOSIS — G40.909 INTELLECTUAL DISABILITY WITH EPILEPSY (HCC): Primary | ICD-10-CM

## 2024-09-25 DIAGNOSIS — K94.23 PEG TUBE MALFUNCTION (HCC): ICD-10-CM

## 2024-09-25 PROBLEM — G40.812 LENNOX-GASTAUT SYNDROME (HCC): Status: RESOLVED | Noted: 2017-07-06 | Resolved: 2024-09-25

## 2024-09-25 PROBLEM — E86.1 HYPOTENSION DUE TO HYPOVOLEMIA: Status: ACTIVE | Noted: 2024-09-25

## 2024-09-25 PROBLEM — E86.0 DEHYDRATION DETERMINED BY EXAMINATION: Status: ACTIVE | Noted: 2024-09-25

## 2024-09-25 PROBLEM — N30.00 ACUTE CYSTITIS: Status: ACTIVE | Noted: 2024-09-25

## 2024-09-25 PROBLEM — R63.6 UNDERWEIGHT: Status: RESOLVED | Noted: 2017-11-22 | Resolved: 2024-09-25

## 2024-09-25 LAB
ANION GAP SERPL CALCULATED.3IONS-SCNC: 6 MMOL/L (ref 4–13)
BASOPHILS # BLD AUTO: 0.06 THOUSANDS/ΜL (ref 0–0.1)
BASOPHILS NFR BLD AUTO: 1 % (ref 0–1)
BUN SERPL-MCNC: 11 MG/DL (ref 5–25)
CALCIUM SERPL-MCNC: 8.7 MG/DL (ref 8.4–10.2)
CHLORIDE SERPL-SCNC: 112 MMOL/L (ref 96–108)
CO2 SERPL-SCNC: 22 MMOL/L (ref 21–32)
CREAT SERPL-MCNC: 0.33 MG/DL (ref 0.6–1.3)
EOSINOPHIL # BLD AUTO: 0.3 THOUSAND/ΜL (ref 0–0.61)
EOSINOPHIL NFR BLD AUTO: 4 % (ref 0–6)
ERYTHROCYTE [DISTWIDTH] IN BLOOD BY AUTOMATED COUNT: 12.4 % (ref 11.6–15.1)
FLUAV RNA RESP QL NAA+PROBE: NEGATIVE
FLUBV RNA RESP QL NAA+PROBE: NEGATIVE
GFR SERPL CREATININE-BSD FRML MDRD: 136 ML/MIN/1.73SQ M
GLUCOSE SERPL-MCNC: 82 MG/DL (ref 65–140)
HCT VFR BLD AUTO: 32.6 % (ref 34.8–46.1)
HGB BLD-MCNC: 10.8 G/DL (ref 11.5–15.4)
IMM GRANULOCYTES # BLD AUTO: 0.03 THOUSAND/UL (ref 0–0.2)
IMM GRANULOCYTES NFR BLD AUTO: 0 % (ref 0–2)
LACTATE SERPL-SCNC: 1.8 MMOL/L (ref 0.5–2)
LYMPHOCYTES # BLD AUTO: 2.55 THOUSANDS/ΜL (ref 0.6–4.47)
LYMPHOCYTES NFR BLD AUTO: 30 % (ref 14–44)
MCH RBC QN AUTO: 33.1 PG (ref 26.8–34.3)
MCHC RBC AUTO-ENTMCNC: 33.1 G/DL (ref 31.4–37.4)
MCV RBC AUTO: 100 FL (ref 82–98)
MONOCYTES # BLD AUTO: 0.72 THOUSAND/ΜL (ref 0.17–1.22)
MONOCYTES NFR BLD AUTO: 8 % (ref 4–12)
NEUTROPHILS # BLD AUTO: 4.94 THOUSANDS/ΜL (ref 1.85–7.62)
NEUTS SEG NFR BLD AUTO: 57 % (ref 43–75)
NRBC BLD AUTO-RTO: 0 /100 WBCS
PLATELET # BLD AUTO: 158 THOUSANDS/UL (ref 149–390)
PMV BLD AUTO: 11.4 FL (ref 8.9–12.7)
POTASSIUM SERPL-SCNC: 4 MMOL/L (ref 3.5–5.3)
PROCALCITONIN SERPL-MCNC: <0.05 NG/ML
RBC # BLD AUTO: 3.26 MILLION/UL (ref 3.81–5.12)
RSV RNA RESP QL NAA+PROBE: NEGATIVE
SARS-COV-2 RNA RESP QL NAA+PROBE: NEGATIVE
SODIUM SERPL-SCNC: 140 MMOL/L (ref 135–147)
WBC # BLD AUTO: 8.6 THOUSAND/UL (ref 4.31–10.16)

## 2024-09-25 PROCEDURE — 85025 COMPLETE CBC W/AUTO DIFF WBC: CPT | Performed by: HOSPITALIST

## 2024-09-25 PROCEDURE — 96367 TX/PROPH/DG ADDL SEQ IV INF: CPT

## 2024-09-25 PROCEDURE — 0241U HB NFCT DS VIR RESP RNA 4 TRGT: CPT | Performed by: HOSPITALIST

## 2024-09-25 PROCEDURE — 80048 BASIC METABOLIC PNL TOTAL CA: CPT | Performed by: HOSPITALIST

## 2024-09-25 PROCEDURE — NC001 PR NO CHARGE: Performed by: HOSPITALIST

## 2024-09-25 PROCEDURE — 87081 CULTURE SCREEN ONLY: CPT | Performed by: HOSPITALIST

## 2024-09-25 PROCEDURE — 99223 1ST HOSP IP/OBS HIGH 75: CPT | Performed by: HOSPITALIST

## 2024-09-25 PROCEDURE — C9254 INJECTION, LACOSAMIDE: HCPCS | Performed by: FAMILY MEDICINE

## 2024-09-25 PROCEDURE — 87154 CUL TYP ID BLD PTHGN 6+ TRGT: CPT | Performed by: EMERGENCY MEDICINE

## 2024-09-25 PROCEDURE — 83605 ASSAY OF LACTIC ACID: CPT | Performed by: EMERGENCY MEDICINE

## 2024-09-25 PROCEDURE — 96361 HYDRATE IV INFUSION ADD-ON: CPT

## 2024-09-25 PROCEDURE — 96365 THER/PROPH/DIAG IV INF INIT: CPT

## 2024-09-25 PROCEDURE — 99223 1ST HOSP IP/OBS HIGH 75: CPT | Performed by: STUDENT IN AN ORGANIZED HEALTH CARE EDUCATION/TRAINING PROGRAM

## 2024-09-25 PROCEDURE — 87040 BLOOD CULTURE FOR BACTERIA: CPT | Performed by: EMERGENCY MEDICINE

## 2024-09-25 PROCEDURE — 96366 THER/PROPH/DIAG IV INF ADDON: CPT

## 2024-09-25 PROCEDURE — 99223 1ST HOSP IP/OBS HIGH 75: CPT | Performed by: FAMILY MEDICINE

## 2024-09-25 PROCEDURE — 36415 COLL VENOUS BLD VENIPUNCTURE: CPT | Performed by: EMERGENCY MEDICINE

## 2024-09-25 PROCEDURE — 71045 X-RAY EXAM CHEST 1 VIEW: CPT

## 2024-09-25 PROCEDURE — 99221 1ST HOSP IP/OBS SF/LOW 40: CPT | Performed by: FAMILY MEDICINE

## 2024-09-25 RX ORDER — SODIUM CHLORIDE, SODIUM LACTATE, POTASSIUM CHLORIDE, CALCIUM CHLORIDE 600; 310; 30; 20 MG/100ML; MG/100ML; MG/100ML; MG/100ML
150 INJECTION, SOLUTION INTRAVENOUS CONTINUOUS
Status: CANCELLED | OUTPATIENT
Start: 2024-09-25

## 2024-09-25 RX ORDER — ALBUMIN (HUMAN) 12.5 G/50ML
25 SOLUTION INTRAVENOUS ONCE
Status: DISCONTINUED | OUTPATIENT
Start: 2024-09-25 | End: 2024-09-25

## 2024-09-25 RX ORDER — LACOSAMIDE 10 MG/ML
200 INJECTION, SOLUTION INTRAVENOUS EVERY 12 HOURS
Status: CANCELLED | OUTPATIENT
Start: 2024-09-25

## 2024-09-25 RX ORDER — CLOBAZAM 10 MG/1
10 TABLET ORAL 2 TIMES DAILY
Status: ON HOLD | COMMUNITY

## 2024-09-25 RX ORDER — CLOBAZAM 10 MG/1
10 TABLET ORAL EVERY 12 HOURS SCHEDULED
Status: CANCELLED | OUTPATIENT
Start: 2024-09-25

## 2024-09-25 RX ORDER — RUFINAMIDE 40 MG/ML
1600 SUSPENSION ORAL EVERY 12 HOURS SCHEDULED
Status: CANCELLED | OUTPATIENT
Start: 2024-09-25

## 2024-09-25 RX ORDER — RUFINAMIDE 40 MG/ML
1600 SUSPENSION ORAL EVERY 12 HOURS SCHEDULED
Status: DISCONTINUED | OUTPATIENT
Start: 2024-09-25 | End: 2024-10-01 | Stop reason: HOSPADM

## 2024-09-25 RX ORDER — CEFTRIAXONE 1 G/50ML
1000 INJECTION, SOLUTION INTRAVENOUS EVERY 24 HOURS
Status: DISCONTINUED | OUTPATIENT
Start: 2024-09-25 | End: 2024-09-25

## 2024-09-25 RX ORDER — LACOSAMIDE 10 MG/ML
200 INJECTION, SOLUTION INTRAVENOUS EVERY 12 HOURS
Status: DISCONTINUED | OUTPATIENT
Start: 2024-09-25 | End: 2024-10-01 | Stop reason: HOSPADM

## 2024-09-25 RX ORDER — CLOBAZAM 10 MG/1
10 TABLET ORAL EVERY 12 HOURS SCHEDULED
Status: DISCONTINUED | OUTPATIENT
Start: 2024-09-25 | End: 2024-10-01 | Stop reason: HOSPADM

## 2024-09-25 RX ORDER — CLOBAZAM 10 MG/1
10 TABLET ORAL EVERY 12 HOURS SCHEDULED
Status: DISCONTINUED | OUTPATIENT
Start: 2024-09-25 | End: 2024-09-25 | Stop reason: HOSPADM

## 2024-09-25 RX ORDER — SODIUM CHLORIDE, SODIUM LACTATE, POTASSIUM CHLORIDE, CALCIUM CHLORIDE 600; 310; 30; 20 MG/100ML; MG/100ML; MG/100ML; MG/100ML
150 INJECTION, SOLUTION INTRAVENOUS CONTINUOUS
Status: DISCONTINUED | OUTPATIENT
Start: 2024-09-25 | End: 2024-09-26

## 2024-09-25 RX ADMIN — RUFINAMIDE 1600 MG: 40 SUSPENSION ORAL at 10:06

## 2024-09-25 RX ADMIN — CLOBAZAM 10 MG: 10 TABLET ORAL at 12:12

## 2024-09-25 RX ADMIN — LACOSAMIDE 200 MG: 10 INJECTION INTRAVENOUS at 23:10

## 2024-09-25 RX ADMIN — BRIVARACETAM 100 MG: 10 SOLUTION ORAL at 18:11

## 2024-09-25 RX ADMIN — LACOSAMIDE 200 MG: 10 INJECTION INTRAVENOUS at 11:51

## 2024-09-25 RX ADMIN — SODIUM CHLORIDE, SODIUM LACTATE, POTASSIUM CHLORIDE, AND CALCIUM CHLORIDE 150 ML/HR: .6; .31; .03; .02 INJECTION, SOLUTION INTRAVENOUS at 16:21

## 2024-09-25 RX ADMIN — CLOBAZAM 10 MG: 10 TABLET ORAL at 21:41

## 2024-09-25 RX ADMIN — CEFTRIAXONE 1000 MG: 1 INJECTION, SOLUTION INTRAVENOUS at 00:34

## 2024-09-25 RX ADMIN — SODIUM CHLORIDE, SODIUM LACTATE, POTASSIUM CHLORIDE, AND CALCIUM CHLORIDE 1000 ML: .6; .31; .03; .02 INJECTION, SOLUTION INTRAVENOUS at 12:45

## 2024-09-25 RX ADMIN — SODIUM CHLORIDE, SODIUM LACTATE, POTASSIUM CHLORIDE, AND CALCIUM CHLORIDE 150 ML/HR: .6; .31; .03; .02 INJECTION, SOLUTION INTRAVENOUS at 04:52

## 2024-09-25 RX ADMIN — SODIUM CHLORIDE, SODIUM LACTATE, POTASSIUM CHLORIDE, AND CALCIUM CHLORIDE 1000 ML: .6; .31; .03; .02 INJECTION, SOLUTION INTRAVENOUS at 02:10

## 2024-09-25 RX ADMIN — TOPIRAMATE 250 MG: 100 TABLET, FILM COATED ORAL at 17:36

## 2024-09-25 RX ADMIN — SODIUM CHLORIDE, SODIUM LACTATE, POTASSIUM CHLORIDE, AND CALCIUM CHLORIDE 150 ML/HR: .6; .31; .03; .02 INJECTION, SOLUTION INTRAVENOUS at 23:11

## 2024-09-25 RX ADMIN — TOPIRAMATE 250 MG: 100 TABLET, FILM COATED ORAL at 10:06

## 2024-09-25 RX ADMIN — SODIUM CHLORIDE, SODIUM LACTATE, POTASSIUM CHLORIDE, AND CALCIUM CHLORIDE 1000 ML: .6; .31; .03; .02 INJECTION, SOLUTION INTRAVENOUS at 17:54

## 2024-09-25 RX ADMIN — BRIVARACETAM 100 MG: 10 SOLUTION ORAL at 10:06

## 2024-09-25 NOTE — H&P
H&P - Hospitalist   Name: Maira Bull 44 y.o. 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 Patient with Lennox Gastuat syndrome and followed by neurology as outpatient. Patient has VNS, Battery exchange recently occurred at [facility]  Patient was sent to the emergency room with persistent seizure from the facility.  Outpatient seizure medication regimen as follows:  Briviact 50 mg BID, Epidiolex 500 mg BID, clobazam 10 mg BID, Vimpat 200 mg BID, Rufinamide 1600 mg BID and Topamax 250 mg BID   Case discussed between ED provider and neurologist at [facility]-accepted for continuous EEG monitoring however unavailable bed  Recommendations made to increase Briviact from 50mg BID to 100 mg in the morning with 50 mg at nighttime x 7 days then 100 mg twice daily-this changes have been initiated  Remains with intermittent seizure activity however not intractable  Patient with ability to protect airway  Cerebral anoxic injury (HCC)  History of cerebral anoxic injury   Patient with significant debility, nursing home resident  With resultant intractable seizure  Continue with supportive care  Hypotension due to hypovolemia  Patient appears volume depleted on examination. Patient with evidence of dry mucous membranes-has not received Jevity x 36 hours  Now s/p multiple boluses  Tube feeds now initiated  Hypotension did respond to IVF, however remains persistent  Administer 1L bolus now, then resume continuous fluids  Without SIRS criteria-low index suspicion for underlying infection, however with persistent hypotension..  Slightly worsened leukocytosis, afebrile, negative lactic acid. Unable to report symptoms.  Blood Cultures 9/25 pending  UA with nitrite,  leukocyte esterase, small WBCs, but innumerable bacteria - unclear infectious but will add on Urine Culture  Check Procalcitonin  CoVID/Flu/RSV swab  MRSA swab  CXR  Empiric Ceftriaxone  S/P placement of VNS (vagus nerve stimulation) device  Battery change-operational  Awaiting transfer to [facility]  Intellectual disability with epilepsy (HCC)  Currently a resident of a skilled nursing facility.    Nonverbal at baseline  Has PEG tube in place, tube feeds resumed    VTE Pharmacologic Prophylaxis: VTE Score: 2 Low Risk (Score 0-2) - Encourage Ambulation. - and SCDs  Code Status: Level 1 - Full Code   Discussion with family: Updated  (father) via phone.    Anticipated Length of Stay: Patient will be admitted on an inpatient basis with an anticipated length of stay of greater than 2 midnights secondary to intractable seizures.    History of Present Illness   Chief Complaint: intractable seizures    Maira Bull is a 44 y.o. female with a PMH of Lennox Gestraut, cerebral anoxic brain injury, nursing home resident on TF/PEG due to dysphagia who presents with intractable seizures. Patient presented initially (9/23) with intractable seizures from skilled nursing facility.  In ED, case discussed between ED provider and neurologist at [facility]-plan to transition to EMU.  Patient has had prolonged stay in ED due to bed availability at [facility], she has also developed hypotension, being admitted to medicine for further management while awaiting transfer.     Review of Systems   Unable to perform ROS: Patient nonverbal       I have reviewed the patient's PMH, PSH, Social History, Family History, Meds, and Allergies  Social History:  Marital Status: Single   Occupation: n/a  Patient Pre-hospital Living Situation: Skilled Nursing Facility: [facility]  Patient Pre-hospital Level of Mobility: unable to be assessed at time of evaluation  Patient Pre-hospital Diet Restrictions: PEG/TF    Objective     Vitals:    Blood Pressure: (!) 77/49 (09/25/24 1200)  Pulse: 73 (09/25/24 1200)  Temperature: (!) 97 °F (36.1 °C) (09/25/24 1129)  Temp Source: Tympanic (09/25/24 1129)  Respirations: (!) 25 (09/25/24 1200)  Height: 5' (152.4 cm) (09/25/24 1129)  Weight - Scale: 42.1 kg (92 lb 13 oz) (09/25/24 1129)  SpO2: 95 % (09/25/24 1129)    Physical Exam  Constitutional:       General: She is not in acute distress.     Comments: Chronically ill appearing, non-verbal   HENT:      Head: Normocephalic.      Mouth/Throat:      Mouth: Mucous membranes are dry.   Eyes:      Conjunctiva/sclera: Conjunctivae normal.   Cardiovascular:      Rate and Rhythm: Normal rate and regular rhythm.      Heart sounds: No murmur heard.  Pulmonary:      Effort: Pulmonary effort is normal. No respiratory distress.      Breath sounds: Normal breath sounds.   Abdominal:      General: There is no distension.      Palpations: Abdomen is soft.      Tenderness: There is no abdominal tenderness.   Musculoskeletal:      Right lower leg: No edema.      Left lower leg: No edema.   Skin:     General: Skin is warm and dry.      Findings: No erythema, lesion or rash.   Neurological:      Comments: Non-verbal, wakes to stimuli and shows purposeful movement (pulled blanket back over herself during exam) but does not follow commands and not alert, spontaneous movement noted in all extremities         Lines/Drains:  Lines/Drains/Airways       Active Status       Name Placement date Placement time Site Days    Gastrostomy/Enterostomy Percutaneous Interventional Gastrostomy 16 Fr. LUQ 06/21/24  0836  LUQ  96                        Additional Data:   Lab Results: I have reviewed the following results: CBC/BMP:   .     09/24/24  2325   WBC 12.18*   HGB 11.4*   HCT 35.4      SODIUM 138   K 3.5      CO2 23   BUN 20   CREATININE 0.44*   GLUC 111    , Lactic Acid:   .     09/25/24  0030   LACTICACID 1.8      Results from last 7 days   Lab Units 09/24/24  2325   WBC  Thousand/uL 12.18*   HEMOGLOBIN g/dL 11.4*   HEMATOCRIT % 35.4   PLATELETS Thousands/uL 182   SEGS PCT % 64   LYMPHO PCT % 23   MONO PCT % 9   EOS PCT % 3     Results from last 7 days   Lab Units 09/24/24  2325 09/23/24  1843   SODIUM mmol/L 138 139   POTASSIUM mmol/L 3.5 3.6   CHLORIDE mmol/L 107 108   CO2 mmol/L 23 21   BUN mg/dL 20 17   CREATININE mg/dL 0.44* 0.49*   ANION GAP mmol/L 8 10   CALCIUM mg/dL 9.0 10.2   ALBUMIN g/dL  --  4.7   TOTAL BILIRUBIN mg/dL  --  0.44   ALK PHOS U/L  --  96   ALT U/L  --  28   AST U/L  --  23   GLUCOSE RANDOM mg/dL 111 122             Lab Results   Component Value Date    HGBA1C 5.2 08/12/2024    HGBA1C 5.3 08/18/2022    HGBA1C 5.6 01/20/2022     Results from last 7 days   Lab Units 09/25/24  0030   LACTIC ACID mmol/L 1.8       Imaging Review: Reviewed radiology reports from this admission including: CT head.  Other Studies: No additional pertinent studies reviewed.    Administrative Statements       ** Please Note: This note has been constructed using a voice recognition system. **

## 2024-09-25 NOTE — PLAN OF CARE
Problem: PAIN - ADULT  Goal: Verbalizes/displays adequate comfort level or baseline comfort level  Description: Interventions:  - Encourage patient to monitor pain and request assistance  - Assess pain using appropriate pain scale  - Administer analgesics based on type and severity of pain and evaluate response  - Implement non-pharmacological measures as appropriate and evaluate response  - Consider cultural and social influences on pain and pain management  - Notify physician/advanced practitioner if interventions unsuccessful or patient reports new pain  Outcome: Progressing     Problem: INFECTION - ADULT  Goal: Absence or prevention of progression during hospitalization  Description: INTERVENTIONS:  - Assess and monitor for signs and symptoms of infection  - Monitor lab/diagnostic results  - Monitor all insertion sites, i.e. indwelling lines, tubes, and drains  - Monitor endotracheal if appropriate and nasal secretions for changes in amount and color  - [place] appropriate cooling/warming therapies per order  - Administer medications as ordered  - Instruct and encourage patient and family to use good hand hygiene technique  - Identify and instruct in appropriate isolation precautions for identified infection/condition  Outcome: Progressing  Goal: Absence of fever/infection during neutropenic period  Description: INTERVENTIONS:  - Monitor WBC    Outcome: Progressing     Problem: Knowledge Deficit  Goal: Patient/family/caregiver demonstrates understanding of disease process, treatment plan, medications, and discharge instructions  Description: Complete learning assessment and assess knowledge base.  Interventions:  - Provide teaching at level of understanding  - Provide teaching via preferred learning methods  Outcome: Progressing

## 2024-09-25 NOTE — ASSESSMENT & PLAN NOTE
-UA positive for leukocytes and nitrates and urine microscopy positive for innumerable bacteria and leukocytes  -Was given Keflex 500 mg yesterday and today in the early morning she received 1 g ceftriaxone    PLAN  -Continue daily ceftriaxone 1g through 9/27/24  -Follow-up urine culture, MRSA culture, and blood cultures

## 2024-09-25 NOTE — ASSESSMENT & PLAN NOTE
Patient presented initially on enthesis 9/23) with reports of multiple intractable seizures at nursing facility  Patient with Lennox Gastuat syndrome and followed by neurology as outpatient  Patient has VNS.   Battery exchange recently occurred at [facility]  Patient was sent to the emergency room with persistent seizure from the facility.  Outpatient seizure medication regimen as follows:  Briviact 50 mg BID, Epidiolex 500 mg BID, clobazam 10 mg BID, Vimpat 200 mg BID, Rufinamide 1600 mg BID and Topamax 250 mg BID   Case discussed between ED provider and neurologist at [facility]-accepted for continuous EEG monitoring however unavailable bed  Recommendations made to increase Briviact from 15 mg twice daily to 100 mg in the morning with 50 mg at nighttime x 7 days then 100 mg twice daily-this changes have been initiated  Remains with intermittent seizure activity however not intractable  Patient  with ability to protect airway

## 2024-09-25 NOTE — DISCHARGE SUMMARY
Discharge Summary - Hospitalist   Name: Maira Bull 44 y.o. 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 Patient with Lennox Gastuat syndrome and followed by neurology as outpatient. Patient has VNS, Battery exchange recently occurred at [facility]  Patient was sent to the emergency room with persistent seizure from the facility.  Outpatient seizure medication regimen as follows:  Briviact 50 mg BID, Epidiolex 500 mg BID, clobazam 10 mg BID, Vimpat 200 mg BID, Rufinamide 1600 mg BID and Topamax 250 mg BID   Case discussed between ED provider and neurologist at [facility]-accepted for continuous EEG monitoring however unavailable bed  Recommendations made to increase Briviact from 50mg BID to 100 mg in the morning with 50 mg at nighttime x 7 days then 100 mg twice daily-this changes have been initiated  Remains with intermittent seizure activity however not intractable  Patient with ability to protect airway        Cerebral anoxic injury (HCC)  History of cerebral anoxic injury   Patient with significant debility, nursing home resident  With resultant intractable seizure  Continue with supportive care  Hypotension due to hypovolemia  Patient appears volume depleted on examination. Patient with evidence of dry mucous membranes-has not received Jevity x 36 hours  Now s/p multiple boluses  Tube feeds now initiated  Hypotension did respond to IVF, however remains persistent  Administer 1L bolus now, then resume continuous fluids  Without SIRS criteria-low index suspicion for underlying infection, however with persistent hypotension..  Slightly worsened leukocytosis, afebrile, negative lactic acid. Unable to report symptoms.  Blood Cultures 9/25 pending  UA  with nitrite, leukocyte esterase, small WBCs, but innumerable bacteria - unclear infectious but will add on Urine Culture - pending  Check Procalcitonin - unremarkable  CoVID/Flu/RSV swab -- negative  MRSA swab  CXR - no acute cardiopulmonary disease  Given above work-up will hold on further antibiotics  Continue to follow VS closely  S/P placement of VNS (vagus nerve stimulation) device  Battery change-operational  Awaiting transfer to [place]  Intellectual disability with epilepsy ([facility])  Currently a resident of a skilled nursing facility.    Nonverbal at baseline  Has PEG tube in place, tube feeds resumed       Functional quadriplegia in the setting of Lennox Gastaust syndrome, as evidenced by bedbound nonverbal patient requiring full care for all ADL's and tube feedings.           Medical Problems       Resolved Problems  Date Reviewed: 9/19/2024            Resolved    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HCC) 9/25/2024     Resolved by  Vlad Eng DO    Overview Addendum 3/10/2020 12:52 PM by Quynh España MD     Transitioned From: Intellectual disability         Formerly [facility] 9/25/2024     Resolved by  Vlad Eng DO        Discharging Physician / Practitioner: John Bravo DO  PCP: Doe Miller DO  Admission Date:   Admission Orders (From admission, onward)       Ordered        09/25/24 1109  INPATIENT ADMISSION  Once                          Discharge/Transfer Date: 09/25/24    Disposition:   Transfer to: [facility]  Reason for Transfer: Intractable Seizures, for Long Term seizure monitoring  Accepting Provider at [facility]: Dr. Elton Delgado     Consultations During Hospital Stay:  Neurology    Procedures Performed:   none    Significant Findings / Test Results:   XR chest portable   Final Result by Khadar Lovell MD (09/25 9245)      No acute cardiopulmonary disease.            Workstation performed: [id number]         CT head without contrast   Final Result by Lamine Hairston  MD (09/23 1938)      No acute intracranial abnormality.                  Workstation performed: [id number]           Lab Results   Component Value Date    WBC 8.60 09/25/2024    HGB 10.8 (L) 09/25/2024    HCT 32.6 (L) 09/25/2024     (H) 09/25/2024     09/25/2024     Lab Results   Component Value Date    SODIUM 140 09/25/2024    K 4.0 09/25/2024     (H) 09/25/2024    CO2 22 09/25/2024    BUN 11 09/25/2024    CREATININE 0.33 (L) 09/25/2024    GLUC 82 09/25/2024    CALCIUM 8.7 09/25/2024         Incidental Findings:   See above   I reviewed the above mentioned incidental findings with the patient and/or family and they expressed understanding.    Test Results Pending at Transfer (will require follow up):   Blood Cultures, Urine Cultures     Outpatient Tests Requested:  TBD    Reason for Admission: Intractable seizures, awaiting transfer    Hospital Course:   Maira Bull is a 44 y.o. female patient who originally presented to the hospital on 9/23/2024 due to intractable seizures, was recommended for transfer to [facility] for long-term EEG monitoring.  Patient been accepted from the emergency department.  She had a prolonged stay in emergency department and did not anticipate soon transfer, so was admitted to hospital for further management.  Patient was noted to be hypotensive on admission.  She responded transiently to fluids.  No obvious evidence of an acute infection.  Suspect this is due to hypovolemia, patient's tube feeds have been resumed, and volume expansion initiated.  Patient now has a bed available at [facility], transferred to [facility] for further management and long-term EEG monitoring..          Please see above list of diagnoses and related plan for additional information.     Condition at Discharge: fair    Discharge Day Visit / Exam:   Subjective:  patient non-verbal  Vitals: Blood Pressure: (!) 87/51 (09/25/24 1630)  Pulse: 87 (09/25/24  1630)  Temperature: (!) 97.4 °F (36.3 °C) (09/25/24 1436)  Temp Source: Tympanic (09/25/24 1436)  Respirations: (!) 31 (09/25/24 1630)  Height: 5' (152.4 cm) (09/25/24 1129)  Weight - Scale: 42.1 kg (92 lb 13 oz) (09/25/24 1129)  SpO2: 96 % (09/25/24 1630)  Physical Exam  Constitutional:       General: She is not in acute distress.     Comments: Chronically ill appearing, non-verbal   HENT:      Head: Normocephalic.      Mouth/Throat:      Mouth: Mucous membranes are dry.   Eyes:      Conjunctiva/sclera: Conjunctivae normal.   Cardiovascular:      Rate and Rhythm: Normal rate and regular rhythm.      Heart sounds: No murmur heard.  Pulmonary:      Effort: Pulmonary effort is normal. No respiratory distress.      Breath sounds: Normal breath sounds.   Abdominal:      General: There is no distension.      Palpations: Abdomen is soft.      Tenderness: There is no abdominal tenderness.   Musculoskeletal:      Right lower leg: No edema.      Left lower leg: No edema.   Skin:     General: Skin is warm and dry.      Findings: No erythema, lesion or rash.   Neurological:      Comments: Non-verbal, wakes to stimuli and shows purposeful movement (pulled blanket back over herself during exam) but does not follow commands and not alert, spontaneous movement noted in all extremities     Discussion with Family: Updated  (father) via phone.     Administrative Statements   Discharge Statement:  I have spent a total time of 40 minutes in caring for this patient on the day of the visit/encounter. >30 minutes of time was spent on: Diagnostic results, Impressions, Documenting in the medical record, and Reviewing / ordering tests, medicine, procedures  .    **Please Note: This note may have been constructed using a voice recognition system.**

## 2024-09-25 NOTE — CONSULTS
TeleConsultation - Neurology   Maira Bull 44 y.o. female MRN: [id number]  Unit/Bed#:  Encounter: [id number]      VIRTUAL CARE DOCUMENTATION:     1. This service was provided via Telemedicine using [employer]     2. Parties in the room with patient during teleconsult RN, PCA    3. Confidentiality My office door was closed     4. Participants No one else was in the room    5. Patient acknowledged consent and understanding of privacy and security of the  Telemedicine consult. I informed the patient that I have reviewed their record in Epic and presented the opportunity for them to ask any questions regarding the visit today.  The patient agreed to participate.    6. Time spent 45 mins       Assessment & Plan     * Intractable Lennox-Gastaut syndrome with breakthrough seizures  Assessment & Plan  Maira Bull is a 44 y.o.  female with Lennox Gestraut, cerebral anoxic brain injury, nursing home resident on TF/PEG due to dysphagia who presents with intractable seizures.  Noted to have hypotension upon arrival with possible dehydration and has not been feed for 36 hours. Dirty UA noted so possible UTI leading to worsening seizures with LGS. No seizure noted while on exam and not noted per RN today.     - Appreciate infectious and metabolic management per primary team  - Agree to increase Briviact to 100mg BID  - Continue rest of home seizure medications.   - Banzel 400mg give 4 tabs twice a day   - Topiramate 250mg tab twice a day   - Epidiolex 500mg twice a day  - Lacosamide 200-200  - Onfi 10mg twice a day   - Recent VNS adjustment  - Patient planned for transfer to [facility] for video EEG  - Ensure K>4, Mg> 2 and Ca>9  - Ativan 2mg IV PRN for generalized seizures lasting > 5 minutes or > 3 partial seizures in 1h without return to baseline  - Avoid epileptogenic medications such as wellbutrin, cefepime, ultram, quinolones, and imipenum  - seizure precautions with padded bed rails    Hypotension due to  hypovolemia  Assessment & Plan  Management per primary team.           Maira Bull will need follow up in at the next regular appointment with epilepsy attending or advance practitioner. She will not require outpatient neurological testing.    History of Present Illness     Reason for Consult / Principal Problem: Breakthrough seizures  Hx and PE limited by: Mental status, non verbal  HPI: Maira Bull is a 44 y.o.  female with Lennox Gestraut, cerebral anoxic brain injury, nursing home resident on TF/PEG due to dysphagia who presents with intractable seizures.  Patient was sent in from SNF after intractable seizures. Noted to have hypotension upon arrival with possible dehydration and has not been feed for 36 hours. Dirty UA noted so possible UTI leading to worsening seizures with LGS.   No more seizures noted per RN today.     Inpatient consult to Neurology  Consult performed by: Hossein Soni MD  Consult ordered by: John Bravo DO           Review of Systems  Unable to obtain due to mental status    Historical Information   Past Medical History:   Diagnosis Date    ADHD     Anoxic brain damage (HCC)     Autistic disorder     Breakthrough seizure (HCC) 8/1/2019    Dysphagia     Dysphagia, oropharyngeal phase     Gastrostomy tube dependent (HCC)     14 Fr as of 05/19/2020    Hyperammonemia (HCC)     Hyperkeratosis     Hypotension     Intellectual disability     Lennox-Gastaut syndrome with tonic seizures (HCC)     Lethargy     Liver enzyme elevation     Onychomycosis     Osteoporosis     Osteoporosis      Past Surgical History:   Procedure Laterality Date    ABDOMINAL SURGERY      CARDIAC PACEMAKER PLACEMENT      vns implant l chest    IR GASTROJEJUNOSTOMY (GJ) TUBE PLACEMENT  12/8/2023    IR GASTROSTOMY (G) TUBE CHECK/CHANGE/REINSERTION/UPSIZE  6/21/2024    IR GASTROSTOMY TUBE PLACEMENT  11/21/2019    IR THORACENTESIS  12/17/2018    JEJUNOSTOMY FEEDING TUBE      history of - most recently, PEG  tube    PEG TUBE PLACEMENT      TN INSJ/RPLCMT CRANIAL NEUROSTIM PULSE GENERATOR Left 2019    Procedure: REPLACEMENT IMPLANTABLE PULSE GENERATOR FOR VAGAL NERVE STIMULATOR, LEFT CHEST;  Surgeon: Patrick Canales MD;  Location: [facility];  Service: Neurosurgery    VAGAL NERVE STIMULATOR (VNS) PLACEMENT Left 2024    Procedure: Reopening of the left chest incision for placement of implantable pulse generator for vagal nerve stimulator;  Surgeon: Charles Pendleton MD;  Location: [facility];  Service: Neurosurgery     Social History   Social History     Substance and Sexual Activity   Alcohol Use Not Currently     Social History     Substance and Sexual Activity   Drug Use Not Currently     E-Cigarette/Vaping    E-Cigarette Use Never User      E-Cigarette/Vaping Substances    Nicotine No     THC No     CBD No     Flavoring No     Other No     Unknown No      Social History     Tobacco Use   Smoking Status Never   Smokeless Tobacco Never     Family History: History reviewed. No pertinent family history.    Review of previous medical records was  completed.     Meds/Allergies   all current active meds have been reviewed, current meds:   Current Facility-Administered Medications:     Brivaracetam (BRIVIACT) oral solution 100 mg, Daily    Brivaracetam (BRIVIACT) oral solution 50 mg, QPM    cefTRIAXone (ROCEPHIN) IVPB (premix in dextrose) 1,000 mg 50 mL, Q24H    cloBAZam (ONFI) tablet 10 mg, Q12H NANDO    lacosamide (VIMPAT) injection 200 mg, Q12H    lactated ringers bolus 1,000 mL, Once, Last Rate: 1,000 mL (24 1245)    lactated ringers infusion, Continuous, Last Rate: 150 mL/hr (24 0452)    Rufinamide SUSP 1,600 mg, Q12H NANDO    topiramate (TOPAMAX) tablet 250 mg, BID, and PTA meds:   Prior to Admission Medications   Prescriptions Last Dose Informant Patient Reported? Taking?   Probiotic Product (ALEXANDER-BID PROBIOTIC PO) Past Week Outside Facility (Specify) Yes Yes   Si tablet by Per G Tube route daily    VITAMIN D PO Past Week Outside Facility (Specify) Yes Yes   Sig: Take 1,000 mg by mouth in the morning Given in her G-tube   acetaminophen (TYLENOL) 325 mg tablet Past Month Outside Facility (Specify) Yes Yes   Sig: Take 650 mg by mouth every 4 (four) hours as needed for mild pain or fever   bisacodyl (DULCOLAX) 10 mg suppository More than a month  Yes No   Sig: Insert 10 mg into the rectum daily   brivaracetam (BRIVIACT) 50 MG TABS tablet 2024  No Yes   Sig: Give 1 tablet via PEG tube q12 hours   cannabidiol (Epidiolex) 100 mg/ml SOLN Past Week  No Yes   Si mL (500 mg total) by Per G Tube route 2 (two) times a day   cloBAZam (ONFI) 10 MG tablet Past Week  No Yes   Sig: Give PEG-tube half a tab in the morning and one tab at bedtime   Patient taking differently: 10 mg 2 (two) times a day Give PEG-tube half a tab in the morning and one tab at bedtime   diazepam (VALIUM) 5 MG/ML solution Past Week  No Yes   Sig: If the patient has a seizure lasting more than 3 minutes then give 1mL by PEG tube, may repeat 1mL if she continues to have seizure for more than 10 minutes.   lacosamide (VIMPAT) 200 mg tablet 2024  No Yes   Sig: Give 1 tablet via PEG tube q12 hours   magnesium hydroxide (MILK OF MAGNESIA) 400 mg/5 mL oral suspension More than a month Outside Facility (Specify) Yes No   Si mL by Per G Tube route daily as needed for constipation   rufinamide (BANZEL) 400 mg tablet 2024  No Yes   Si tablets (1,600 mg total) by Per G Tube route every 12 (twelve) hours   sodium phosphate (PEDIA-LAX) 3.5-9.5 g 59 mL enema Not Taking Outside Facility (Specify) Yes No   Sig: Insert 1 enema into the rectum once   Patient not taking: Reported on 2024   topiramate (TOPAMAX) 50 MG tablet 2024  No Yes   Si tablets (250 mg total) by Per G Tube route every 12 (twelve) hours      Facility-Administered Medications: None       Allergies   Allergen Reactions    Felbamate        Objective    Vitals:Blood pressure (!) 78/52, pulse 68, temperature (!) 97 °F (36.1 °C), temperature source Tympanic, resp. rate 19, height 5' (1.524 m), weight 42.1 kg (92 lb 13 oz), last menstrual period 09/25/2024, SpO2 96%.,Body mass index is 18.13 kg/m².    Intake/Output Summary (Last 24 hours) at 9/25/2024 1411  Last data filed at 9/25/2024 1201  Gross per 24 hour   Intake 3937 ml   Output 425 ml   Net 3512 ml       Invasive Devices:   Invasive Devices       Peripheral Intravenous Line  Duration             Peripheral IV 09/24/24 Right;Ventral (anterior) Forearm <1 day              Drain  Duration             Gastrostomy/Enterostomy Percutaneous Interventional Gastrostomy 16 Fr. LUQ 96 days                    Physical Exam  Modified PE as this is a video consultation:  Gen:   NAD. Laying in bed, non verbal  HEENT:  No Septal deviation EOMI NCAT.  Resp:  Symmetric chest rise and patient in no obvious respiratory distress  Skin: No rash noted in visualized portion of this exam    Neurologic Exam  Awake, non verbal, make some moaning noises of moving her during Xray  Arms in bended position lateral to trunk, contracted fingers, some spontaneous movements in her fingers  Bilateral legs spastic and contracted in flex position  Unable to participate in rest of the exam due to poor baseline.       Lab Results: I have personally reviewed pertinent reports.  , CBC:   Results from last 7 days   Lab Units 09/24/24 2325 09/23/24  1843   WBC Thousand/uL 12.18* 11.34*   RBC Million/uL 3.49* 4.29   HEMOGLOBIN g/dL 11.4* 13.8   HEMATOCRIT % 35.4 42.3   MCV fL 101* 99*   PLATELETS Thousands/uL 182 212   , BMP/CMP:   Results from last 7 days   Lab Units 09/25/24  1330 09/24/24  2325 09/23/24  1843   SODIUM mmol/L 140 138 139   POTASSIUM mmol/L 4.0 3.5 3.6   CHLORIDE mmol/L 112* 107 108   CO2 mmol/L 22 23 21   BUN mg/dL 11 20 17   CREATININE mg/dL 0.33* 0.44* 0.49*   CALCIUM mg/dL 8.7 9.0 10.2   AST U/L  --   --  23   ALT U/L  --   --  28    ALK PHOS U/L  --   --  96   EGFR ml/min/1.73sq m 136 124 119   , HgBA1C:   , Ammonia:   , Urinalysis:   Results from last 7 days   Lab Units 09/24/24  2220   COLOR UA  Yellow   CLARITY UA  Slightly Cloudy   SPEC GRAV UA  >=1.030   PH UA  6.0   LEUKOCYTES UA  Small*   NITRITE UA  Positive*   GLUCOSE UA mg/dl Negative   KETONES UA mg/dl Negative   BILIRUBIN UA  Negative   BLOOD UA  Negative     Imaging Studies: Personally reviewed the following image studies in PACS and associated radiology reports: CT head. My interpretation of the radiology images/reports is: without acute bleed or large infarct.  EKG, Pathology, and Other Studies: No pertinent imaging studies reviewed.  VTE Prophylaxis: VTE covered by:    None       Code Status: Level 1 - Full Code

## 2024-09-25 NOTE — CONSULTS
Consultation - Hospitalist   Name: Maira Bull 44 y.o. female I MRN: [id number]  Unit/Bed#: RM06 I Date of Admission: 9/23/2024   Date of Service: 9/25/2024 I Hospital Day: 0   Consults  Physician Requesting Evaluation: Susy Powell MD  Reason for Evaluation / Principal Problem: Evaluation for medical management in ED patient awaiting transfer to tertiary care center    Assessment & Plan  Intractable Lennox-Gastaut syndrome with breakthrough seizures  Patient presented initially on enthesis 9/23) with reports of multiple intractable seizures at nursing facility  Patient with Lennox Gastuat syndrome and followed by neurology as outpatient  Patient has VNS.   Battery exchange recently occurred at [facility]  Patient was sent to the emergency room with persistent seizure from the facility.  Outpatient seizure medication regimen as follows:  Briviact 50 mg BID, Epidiolex 500 mg BID, clobazam 10 mg BID, Vimpat 200 mg BID, Rufinamide 1600 mg BID and Topamax 250 mg BID   Case discussed between ED provider and neurologist at [facility]-accepted for continuous EEG monitoring however unavailable bed  Recommendations made to increase Briviact from 15 mg twice daily to 100 mg in the morning with 50 mg at nighttime x 7 days then 100 mg twice daily-this changes have been initiated  Remains with intermittent seizure activity however not intractable  Patient  with ability to protect airway  Cerebral anoxic injury (HCC)  History of cerebral anoxic injury   Patient with significant debility, nursing home resident  With resultant intractable seizure  Continue with supportive care  Dehydration determined by examination  Patient appears volume depleted on examination  Patient with evidence of dry mucous membranes-has not received Jevity x 36 hours  Initiate hydration-will administer bolus followed by maintenance fluids  Tube feeds initiated this evening  Mild hypotension which is improved with administration of fluids  Without SIRS  criteria-low index suspicion for underlying infection  S/P placement of VNS (vagus nerve stimulation) device  Battery change-operational  Awaiting transfer to [facility]  Intellectual disability with epilepsy (HCC)  Currently a resident of a skilled nursing facility.    Nonverbal at baseline  Has PEG tube in place, tube feeds resumed  Hospitalist service will follow.    VTE Pharmacologic Prophylaxis:   Moderate Risk (Score 3-4) - Pharmacological DVT Prophylaxis Ordered: enoxaparin (Lovenox).  Code Status: Prior     History of Present Illness   Chief Complaint: Seizures    Maira Bull is a 44 y.o. female with a PMH of Lennox Gastraut who presents with seizure.    Patient presented initially (9/23) with intractable seizures from skilled nursing facility.  In ED, case discussed between ED provider and neurologist at [facility]-plan to transition to EMU.  Medicine consulted for assistance in medical management and patient awaiting transfer.    Review of Systems   Unable to perform ROS: Patient nonverbal       I have reviewed the patient's PMH, PSH, Social History, Family History, Meds, and Allergies  Social History:  Marital Status: Single     Objective     Vitals:   Blood Pressure: (!) 79/50 (09/25/24 0200)  Pulse: 79 (09/25/24 0200)  Temperature: 98.2 °F (36.8 °C) (09/23/24 1820)  Temp Source: Temporal (09/23/24 1820)  Respirations: 18 (09/25/24 0200)  Weight - Scale: 45.3 kg (99 lb 13.9 oz) (09/25/24 0138)  SpO2: 96 % (09/25/24 0200)    Physical Exam  Constitutional:       General: She is sleeping. She is not in acute distress.     Appearance: She is underweight. She is ill-appearing. She is not toxic-appearing or diaphoretic.   HENT:      Right Ear: There is no impacted cerumen.      Left Ear: There is no impacted cerumen.      Nose: Nose normal.      Mouth/Throat:      Mouth: Mucous membranes are dry.      Pharynx: No oropharyngeal exudate.   Eyes:      General: No scleral icterus.     Conjunctiva/sclera:  Conjunctivae normal.   Cardiovascular:      Rate and Rhythm: Normal rate and regular rhythm.      Pulses: Normal pulses.      Heart sounds: No murmur heard.  Pulmonary:      Effort: No respiratory distress.      Breath sounds: No wheezing.   Abdominal:      General: Abdomen is flat.      Palpations: Abdomen is soft.   Musculoskeletal:      Right lower leg: No edema.      Left lower leg: No edema.   Skin:     General: Skin is warm.      Capillary Refill: Capillary refill takes less than 2 seconds.   Neurological:      Mental Status: She is lethargic.      Cranial Nerves: No facial asymmetry.      Motor: Weakness, tremor, atrophy, abnormal muscle tone and seizure activity present.   Psychiatric:         Behavior: Behavior is uncooperative.         Lines/Drains:  Lines/Drains/Airways       Active Status       Name Placement date Placement time Site Days    Gastrostomy/Enterostomy Percutaneous Interventional Gastrostomy 16 Fr. LUQ 06/21/24  0836  LUQ  95                        Additional Data:   Lab Results: I have reviewed the following results:   Results from last 7 days   Lab Units 09/24/24  2325   WBC Thousand/uL 12.18*   HEMOGLOBIN g/dL 11.4*   HEMATOCRIT % 35.4   PLATELETS Thousands/uL 182   SEGS PCT % 64   LYMPHO PCT % 23   MONO PCT % 9   EOS PCT % 3     Results from last 7 days   Lab Units 09/24/24  2325 09/23/24  1843   SODIUM mmol/L 138 139   POTASSIUM mmol/L 3.5 3.6   CHLORIDE mmol/L 107 108   CO2 mmol/L 23 21   BUN mg/dL 20 17   CREATININE mg/dL 0.44* 0.49*   ANION GAP mmol/L 8 10   CALCIUM mg/dL 9.0 10.2   ALBUMIN g/dL  --  4.7   TOTAL BILIRUBIN mg/dL  --  0.44   ALK PHOS U/L  --  96   ALT U/L  --  28   AST U/L  --  23   GLUCOSE RANDOM mg/dL 111 122             Lab Results   Component Value Date    HGBA1C 5.2 08/12/2024    HGBA1C 5.3 08/18/2022    HGBA1C 5.6 01/20/2022     Results from last 7 days   Lab Units 09/25/24  0030   LACTIC ACID mmol/L 1.8       Imaging Review: Reviewed radiology reports from this  admission including: chest xray.  Other Studies: EKG was reviewed.     Administrative Statements   I have spent a total time of 45 minutes in caring for this patient on the day of the visit/encounter including Diagnostic results.    ** Please Note: This note has been constructed using a voice recognition system. **

## 2024-09-25 NOTE — ASSESSMENT & PLAN NOTE
Patient appears volume depleted on examination. Patient with evidence of dry mucous membranes-has not received Jevity x 36 hours  Now s/p multiple boluses  Tube feeds now initiated  Hypotension did respond to IVF, however remains persistent  Administer 1L bolus now, then resume continuous fluids  Without SIRS criteria-low index suspicion for underlying infection, however with persistent hypotension..  Slightly worsened leukocytosis, afebrile, negative lactic acid. Unable to report symptoms.  Blood Cultures 9/25 pending  UA with nitrite, leukocyte esterase, small WBCs, but innumerable bacteria - unclear infectious but will add on Urine Culture  Check Procalcitonin  CoVID/Flu/RSV swab  MRSA swab  CXR  Empiric Ceftriaxone

## 2024-09-25 NOTE — H&P
H&P - Hospitalist   Name: Maira Bull 44 y.o. female I MRN: [id number]  [facility] I Date of Admission: 9/25/2024   Date of Service: 9/25/2024 I Hospital Day: 0     Assessment & Plan  Cerebral anoxic injury (HCC)  -History of cerebral anoxic injury and essentially nonverbal at baseline, patient with significant debility and is nursing home resident. Complicated by intractable seizure with plan for this as per the primary problem    PLAN  -Continue supportive care  Intractable Lennox-Gastaut syndrome with breakthrough seizures  -Patient with anoxic assault syndrome following with [facility] neurology as outpatient additionally with recent VNS battery exchange here 8/12/2024 with recent setting changes by neurology 9/19/2024.    -CT head 9/23/24 performed in ED unremarkable   -CXR 9/25/24 unremarkable  -Normal lacosamide levels  -home on cannabinoid 500 mg twice daily   -It is possible that her breakthrough seizures were triggered by her UTI    PLAN  -Continue home medications brivaracetam 50mg every 12 hours, clobazam 5 mg in a.m. and 10 mg at bedtime, lacosamide 200 mg twice daily, rufinamide 1600 mg twice daily, topiramate 250 mg twice daily.  Defer adjustment of antiepileptics to neurology service  -Seizure precautions  -As needed benzodiazepine for any breakthrough seizure activity lasting greater than 3 minutes and will alert neurology if this should occur  -Neurology consult   -Treat UTI  -Follow-up topiramate, rufinamide, and clobazam levels  Moderate protein-calorie malnutrition (HCC)  -has PEG tube in place  -BMI low at 18.13    PLAN  -nutrition consult for tube feeds  Acute cystitis  -UA positive for leukocytes and nitrates and urine microscopy positive for innumerable bacteria and leukocytes  -Was given Keflex 500 mg yesterday and today in the early morning she received 1 g ceftriaxone    PLAN  -Continue daily ceftriaxone 1g through 9/27/24  -Follow-up urine culture, MRSA culture, and blood  cultures      VTE Pharmacologic Prophylaxis:   Low Risk (Score 0-2) - Encourage Ambulation. SCD  Code Status: Level 1 - Full Code   Discussion with family: Attempted to update  (father) via phone. Left voicemail.     Anticipated Length of Stay: Patient will be admitted on an inpatient basis with an anticipated length of stay of greater than 2 midnights secondary to neurology consultation for breakthrough seizures in the setting of intractable Lennox Gestalt seizures.    History of Present Illness   Chief Complaint: Seizures    Maira Bull is a 44 y.o. female with a PMH of intractable Lennox Gestalt syndrome, cerebral anoxic injury, and is largely nonverbal who presents with breakthrough seizures.  She originally presented to [facility] and is being transferred here for EEG monitoring by neurology.  She has a gastrostomy tube (PEG) for feeding that often gets dislodged.  She also has an implantable pulse generator for vagal nerve stimulation    Review of Systems   Unable to perform ROS: Other   Patient is nonverbal    I have reviewed the patient's PMH, PSH, Social History, Family History, Meds, and Allergies  Social History:  Marital Status: Single   Occupation: N/A  Patient Pre-hospital Living Situation: Skilled Nursing Facility: [facility]  Patient Pre-hospital Level of Mobility: unable to be assessed at time of evaluation  Patient Pre-hospital Diet Restrictions: PEG/TF    Objective     Vitals:   Blood Pressure: (!) 85/53 (09/25/24 2106)  Pulse: 80 (09/25/24 2106)  Temperature: 97.8 °F (36.6 °C) (09/25/24 2106)  Respirations: 19 (09/25/24 2106)  SpO2: 99 % (09/25/24 2106)    Physical Exam  Constitutional:       Appearance: She is not ill-appearing or toxic-appearing.   HENT:      Head: Normocephalic.      Nose: No congestion or rhinorrhea.   Cardiovascular:      Rate and Rhythm: Normal rate.      Heart sounds: No murmur heard.  Pulmonary:      Effort: No respiratory distress.      Breath  "sounds: No wheezing.   Musculoskeletal:         General: No tenderness or signs of injury.   Skin:     General: Skin is warm and dry.   Neurological:      Mental Status: She is alert. Mental status is at baseline. She is disoriented.         Lines/Drains:  Lines/Drains/Airways       Active Status       Name Placement date Placement time Site Days    Gastrostomy/Enterostomy Percutaneous Interventional Gastrostomy 16 Fr. LUQ 06/21/24  0836  LUQ  96                        Additional Data:   Lab Results: I have reviewed the following results: CBC/BMP:   .     09/25/24  1330 09/25/24  1436   WBC  --  8.60   HGB  --  10.8*   HCT  --  32.6*   PLT  --  158   SODIUM 140  --    K 4.0  --    *  --    CO2 22  --    BUN 11  --    CREATININE 0.33*  --    GLUC 82  --     , LFTs: No new results in last 24 hours. , PTT/INR:No new results in last 24 hours. , Lactic Acid:   .     09/25/24  0030   LACTICACID 1.8    , Procalcitonin:   Lab Results   Component Value Date    PROCALCITONI <0.05 09/24/2024   , Blood Culture:   Lab Results   Component Value Date    BLOODCX Received in Microbiology Lab. Culture in Progress. 09/25/2024    BLOODCX Received in Microbiology Lab. Culture in Progress. 09/25/2024   , Urinalysis:   Lab Results   Component Value Date    COLORU Yellow 09/24/2024    CLARITYU Slightly Cloudy 09/24/2024    SPECGRAV >=1.030 09/24/2024    PHUR 6.0 09/24/2024    LEUKOCYTESUR Small (A) 09/24/2024    NITRITE Positive (A) 09/24/2024    GLUCOSEU Negative 09/24/2024    KETONESU Negative 09/24/2024    BILIRUBINUR Negative 09/24/2024    BLOODU Negative 09/24/2024   , Urine Culture: No results found for: \"URINECX\", Medication Drug Levels:   Results from last 7 days   Lab Units 09/23/24  1843   LACOSAMIDE ug/mL 5.28   , RSV:   Lab Results   Component Value Date    RSV Negative 09/25/2024   , FLU: No components found for: \"INFLUENZA\"  Results from last 7 days   Lab Units 09/25/24  1436   WBC Thousand/uL 8.60   HEMOGLOBIN g/dL " 10.8*   HEMATOCRIT % 32.6*   PLATELETS Thousands/uL 158   SEGS PCT % 57   LYMPHO PCT % 30   MONO PCT % 8   EOS PCT % 4     Results from last 7 days   Lab Units 09/25/24  1330 09/24/24  2325 09/23/24  1843   SODIUM mmol/L 140   < > 139   POTASSIUM mmol/L 4.0   < > 3.6   CHLORIDE mmol/L 112*   < > 108   CO2 mmol/L 22   < > 21   BUN mg/dL 11   < > 17   CREATININE mg/dL 0.33*   < > 0.49*   ANION GAP mmol/L 6   < > 10   CALCIUM mg/dL 8.7   < > 10.2   ALBUMIN g/dL  --   --  4.7   TOTAL BILIRUBIN mg/dL  --   --  0.44   ALK PHOS U/L  --   --  96   ALT U/L  --   --  28   AST U/L  --   --  23   GLUCOSE RANDOM mg/dL 82   < > 122    < > = values in this interval not displayed.             Lab Results   Component Value Date    HGBA1C 5.2 08/12/2024    HGBA1C 5.3 08/18/2022    HGBA1C 5.6 01/20/2022     Results from last 7 days   Lab Units 09/25/24  0030 09/24/24  2325   LACTIC ACID mmol/L 1.8  --    PROCALCITONIN ng/ml  --  <0.05       Imaging Review: Reviewed radiology reports from this admission including: chest xray and CT head.  Other Studies: EKG was reviewed.     Administrative Statements   I have spent a total time of 55 minutes in caring for this patient on the day of the visit/encounter including Diagnostic results, Prognosis, Risks and benefits of tx options, Instructions for management, Patient and family education, Importance of tx compliance, Risk factor reductions, Impressions, Counseling / Coordination of care, Documenting in the medical record, Reviewing / ordering tests, medicine, procedures  , Obtaining or reviewing history  , and Communicating with other healthcare professionals .    ** Please Note: This note has been constructed using a voice recognition system. **

## 2024-09-25 NOTE — ASSESSMENT & PLAN NOTE
Patient presented initially on 9/23 with reports of multiple intractable seizures at nursing facility. Patient with Lennox Gastuat syndrome and followed by neurology as outpatient. Patient has VNS, Battery exchange recently occurred at [facility]  Patient was sent to the emergency room with persistent seizure from the facility.  Outpatient seizure medication regimen as follows:  Briviact 50 mg BID, Epidiolex 500 mg BID, clobazam 10 mg BID, Vimpat 200 mg BID, Rufinamide 1600 mg BID and Topamax 250 mg BID   Case discussed between ED provider and neurologist at [facility]-accepted for continuous EEG monitoring however unavailable bed  Recommendations made to increase Briviact from 50mg BID to 100 mg in the morning with 50 mg at nighttime x 7 days then 100 mg twice daily-this changes have been initiated  Remains with intermittent seizure activity however not intractable  Patient with ability to protect airway

## 2024-09-25 NOTE — ASSESSMENT & PLAN NOTE
Patient appears volume depleted on examination  Patient with evidence of dry mucous membranes-has not received Jevity x 36 hours  Initiate hydration-will administer bolus followed by maintenance fluids  Tube feeds initiated this evening  Mild hypotension which is improved with administration of fluids  Without SIRS criteria-low index suspicion for underlying infection

## 2024-09-25 NOTE — ASSESSMENT & PLAN NOTE
Maira Bull is a 44 y.o.  female with Lennox Gestraut, cerebral anoxic brain injury, nursing home resident on TF/PEG due to dysphagia who presents with intractable seizures.  Noted to have hypotension upon arrival with possible dehydration and has not been feed for 36 hours. Dirty UA noted so possible UTI leading to worsening seizures with LGS. No seizure noted while on exam and not noted per RN today.     - Appreciate infectious and metabolic management per primary team  - Agree to increase Briviact to 100mg BID  - Continue rest of home seizure medications.   - Banzel 400mg give 4 tabs twice a day   - Topiramate 250mg tab twice a day   - Epidiolex 500mg twice a day  - Lacosamide 200-200  - Onfi 10mg twice a day   - Recent VNS adjustment  - Patient planned for transfer [facility] hospitals for video EEG  - Ensure K>4, Mg> 2 and Ca>9  - Ativan 2mg IV PRN for generalized seizures lasting > 5 minutes or > 3 partial seizures in 1h without return to baseline  - Avoid epileptogenic medications such as wellbutrin, cefepime, ultram, quinolones, and imipenum  - seizure precautions with padded bed rails

## 2024-09-25 NOTE — ASSESSMENT & PLAN NOTE
Currently a resident of a skilled nursing facility.    Nonverbal at baseline  Has PEG tube in place, tube feeds resumed

## 2024-09-25 NOTE — ASSESSMENT & PLAN NOTE
Patient appears volume depleted on examination. Patient with evidence of dry mucous membranes-has not received Jevity x 36 hours  Now s/p multiple boluses  Tube feeds now initiated  Hypotension did respond to IVF, however remains persistent  Administer 1L bolus now, then resume continuous fluids  Without SIRS criteria-low index suspicion for underlying infection, however with persistent hypotension..  Slightly worsened leukocytosis, afebrile, negative lactic acid. Unable to report symptoms.  Blood Cultures 9/25 pending  UA with nitrite, leukocyte esterase, small WBCs, but innumerable bacteria - unclear infectious but will add on Urine Culture - pending  Check Procalcitonin - unremarkable  CoVID/Flu/RSV swab -- negative  MRSA swab  CXR - no acute cardiopulmonary disease  Given above work-up will hold on further antibiotics  Continue to follow VS closely

## 2024-09-25 NOTE — ED CARE HANDOFF
Progress note:    Patient started on tube feeds and fluids today, slightly hypotensive with decreased urine output, given fluid bolus, straight cath for 400 cc. UA sent which is positive for UTI, started on keflex. Repeat labs sent. Medicine consult placed for continued inpatient management.     Assessment/plan:  Patient received in sign out for recurrent breakthrough seizures, hx of Lennox-Gestalt syndrome and epilepsy on multiple AEDs, awaiting bed placement at [facility] for EMU.         Susy Powell MD  09/24/24 [id number]

## 2024-09-25 NOTE — ASSESSMENT & PLAN NOTE
Patient presented initially on 9/23 with reports of multiple intractable seizures at nursing facility. Patient with Lennox Gastuat syndrome and followed by neurology as outpatient. Patient has VNS, Battery exchange recently occurred at [facility]  Patient was sent to the emergency room with persistent seizure from the facility.  Outpatient seizure medication regimen as follows:  Briviact 50 mg BID, Epidiolex 500 mg BID, clobazam 10 mg BID, Vimpat 200 mg BID, Rufinamide 1600 mg BID and Topamax 250 mg BID   Case discussed between ED provider and neurologist at [facility]-accepted for continuous EEG monitoring however unavailable bed  Recommendations made to increase Briviact from 50mg BID to 100 mg in the morning with 50 mg at nighttime x 7 days then 100 mg twice daily-this changes have been initiated  Remains with intermittent seizure activity however not intractable  Patient with ability to protect airway

## 2024-09-26 ENCOUNTER — APPOINTMENT (INPATIENT)
Dept: RADIOLOGY | Facility: HOSPITAL | Age: 44
DRG: 101 | End: 2024-09-26
Payer: MEDICARE

## 2024-09-26 LAB
ANION GAP SERPL CALCULATED.3IONS-SCNC: 7 MMOL/L (ref 4–13)
ATRIAL RATE: 100 BPM
BUN SERPL-MCNC: 5 MG/DL (ref 5–25)
CALCIUM SERPL-MCNC: 8.9 MG/DL (ref 8.4–10.2)
CHLORIDE SERPL-SCNC: 111 MMOL/L (ref 96–108)
CO2 SERPL-SCNC: 22 MMOL/L (ref 21–32)
CREAT SERPL-MCNC: 0.33 MG/DL (ref 0.6–1.3)
ERYTHROCYTE [DISTWIDTH] IN BLOOD BY AUTOMATED COUNT: 12.7 % (ref 11.6–15.1)
GFR SERPL CREATININE-BSD FRML MDRD: 136 ML/MIN/1.73SQ M
GLUCOSE SERPL-MCNC: 89 MG/DL (ref 65–140)
HCT VFR BLD AUTO: 34.4 % (ref 34.8–46.1)
HGB BLD-MCNC: 11.1 G/DL (ref 11.5–15.4)
MCH RBC QN AUTO: 33.2 PG (ref 26.8–34.3)
MCHC RBC AUTO-ENTMCNC: 32.3 G/DL (ref 31.4–37.4)
MCV RBC AUTO: 103 FL (ref 82–98)
MRSA NOSE QL CULT: NORMAL
P AXIS: 71 DEGREES
PLATELET # BLD AUTO: 180 THOUSANDS/UL (ref 149–390)
PMV BLD AUTO: 12.2 FL (ref 8.9–12.7)
POTASSIUM SERPL-SCNC: 3.5 MMOL/L (ref 3.5–5.3)
PR INTERVAL: 138 MS
QRS AXIS: 87 DEGREES
QRSD INTERVAL: 68 MS
QT INTERVAL: 328 MS
QTC INTERVAL: 423 MS
RBC # BLD AUTO: 3.34 MILLION/UL (ref 3.81–5.12)
SODIUM SERPL-SCNC: 140 MMOL/L (ref 135–147)
T WAVE AXIS: 60 DEGREES
TOPIRAMATE SERPL-MCNC: 9.3 UG/ML (ref 2–25)
VENTRICULAR RATE: 100 BPM
WBC # BLD AUTO: 6.09 THOUSAND/UL (ref 4.31–10.16)

## 2024-09-26 PROCEDURE — NC001 PR NO CHARGE: Performed by: PSYCHIATRY & NEUROLOGY

## 2024-09-26 PROCEDURE — 93010 ELECTROCARDIOGRAM REPORT: CPT | Performed by: INTERNAL MEDICINE

## 2024-09-26 PROCEDURE — 99232 SBSQ HOSP IP/OBS MODERATE 35: CPT | Performed by: PHYSICIAN ASSISTANT

## 2024-09-26 PROCEDURE — C9254 INJECTION, LACOSAMIDE: HCPCS | Performed by: FAMILY MEDICINE

## 2024-09-26 PROCEDURE — 85027 COMPLETE CBC AUTOMATED: CPT | Performed by: FAMILY MEDICINE

## 2024-09-26 PROCEDURE — 0D20XUZ CHANGE FEEDING DEVICE IN UPPER INTESTINAL TRACT, EXTERNAL APPROACH: ICD-10-PCS | Performed by: RADIOLOGY

## 2024-09-26 PROCEDURE — 80048 BASIC METABOLIC PNL TOTAL CA: CPT | Performed by: FAMILY MEDICINE

## 2024-09-26 PROCEDURE — 99222 1ST HOSP IP/OBS MODERATE 55: CPT | Performed by: NURSE PRACTITIONER

## 2024-09-26 PROCEDURE — 49450 REPLACE G/C TUBE PERC: CPT

## 2024-09-26 PROCEDURE — C1769 GUIDE WIRE: HCPCS

## 2024-09-26 PROCEDURE — 49450 REPLACE G/C TUBE PERC: CPT | Performed by: RADIOLOGY

## 2024-09-26 PROCEDURE — 99233 SBSQ HOSP IP/OBS HIGH 50: CPT | Performed by: PSYCHIATRY & NEUROLOGY

## 2024-09-26 RX ORDER — LIDOCAINE HYDROCHLORIDE 20 MG/ML
JELLY TOPICAL AS NEEDED
Status: COMPLETED | OUTPATIENT
Start: 2024-09-26 | End: 2024-09-26

## 2024-09-26 RX ORDER — SODIUM CHLORIDE 9 MG/ML
75 INJECTION, SOLUTION INTRAVENOUS CONTINUOUS
Status: DISCONTINUED | OUTPATIENT
Start: 2024-09-26 | End: 2024-09-27

## 2024-09-26 RX ADMIN — LACOSAMIDE 200 MG: 10 INJECTION INTRAVENOUS at 13:26

## 2024-09-26 RX ADMIN — SODIUM CHLORIDE, SODIUM LACTATE, POTASSIUM CHLORIDE, AND CALCIUM CHLORIDE 150 ML/HR: .6; .31; .03; .02 INJECTION, SOLUTION INTRAVENOUS at 06:33

## 2024-09-26 RX ADMIN — CANNABIDIOL 100 MG: 100 SOLUTION ORAL at 13:29

## 2024-09-26 RX ADMIN — TOPIRAMATE 250 MG: 100 TABLET, FILM COATED ORAL at 17:54

## 2024-09-26 RX ADMIN — BRIVARACETAM 100 MG: 10 SOLUTION ORAL at 18:10

## 2024-09-26 RX ADMIN — CLOBAZAM 10 MG: 10 TABLET ORAL at 13:27

## 2024-09-26 RX ADMIN — LIDOCAINE HYDROCHLORIDE 1 APPLICATION: 20 JELLY TOPICAL at 12:29

## 2024-09-26 RX ADMIN — TOPIRAMATE 250 MG: 100 TABLET, FILM COATED ORAL at 13:27

## 2024-09-26 RX ADMIN — SODIUM CHLORIDE 75 ML/HR: 0.9 INJECTION, SOLUTION INTRAVENOUS at 17:57

## 2024-09-26 RX ADMIN — CANNABIDIOL 100 MG: 100 SOLUTION ORAL at 17:54

## 2024-09-26 RX ADMIN — CANNABIDIOL 400 MG: 100 SOLUTION ORAL at 17:55

## 2024-09-26 RX ADMIN — CANNABIDIOL 400 MG: 100 SOLUTION ORAL at 13:29

## 2024-09-26 RX ADMIN — LACOSAMIDE 200 MG: 10 INJECTION INTRAVENOUS at 23:01

## 2024-09-26 RX ADMIN — CLOBAZAM 10 MG: 10 TABLET ORAL at 23:01

## 2024-09-26 RX ADMIN — CEFTRIAXONE 1000 MG: 1 INJECTION, POWDER, FOR SOLUTION INTRAMUSCULAR; INTRAVENOUS at 23:01

## 2024-09-26 NOTE — PROGRESS NOTES
Progress Note - Neurology   Name: Maira Bull 44 y.o. female I MRN: [id number]  Unit/Bed#: [facility] 713-01 I Date of Admission: 9/25/2024   Date of Service: 9/26/2024 I Hospital Day: 1    Assessment & Plan  Intractable Lennox-Gastaut syndrome with breakthrough seizures  Maira Bull is a 44 y.o.  female with Lennox Gestraut, S/P vagus nerve stimulator placement (generator replaced 8/12/24), cerebral anoxic brain injury, dysphagia s/p peg who presented to [facility] ED 9/23/24 from SNF with intractable seizures.  Noted to have hypotension upon arrival with possible dehydration.  Also possible UTI leading to worsening seizures with LGS.     Patient was transferred to [facility] 9/25 for video EEG monitoring however, she has since returned to baseline following increase of outpatient breviact.    Previous EEG studies show both generalized onset and potential risk for focal onset seizures. Semiology described as drop attacks with grunting sounds, RUE episodic spasms/myoclonic jerking, generalized convulsions, tonic seizures with eyes rolling back primarily during sleep    At present, concern for possible g-tube malposition. Patient received PM AEDs, pending IR evaluation this AM.Unfortunately only 1 of her 6 AEDs (vimpat) are able to be converted to IV per pharmacy. Recommend temprorary NGT placement for medication administration in the interim. Appears at baseline at this time    Neuroimaging/work-up:  9/23 CTH without acute intracranial findings  9/23 lacosamide level 5.28, clobazam, banzel and topiramate levels pending  9/24 UA +, culture pending  9/25 Blood cultures 1/2 +GPC in clusters; coag neg staph    Recommendations:  - Briviact increased to 100mg BID, continue same  - Continue rest of home seizure medications.   - Banzel 400mg give 4 tabs twice a day   - Topiramate 250mg tab twice a day   - Epidiolex 500mg twice a day - ordered as non-formulary  - Lacosamide 200-200  - Onfi 10mg twice a day   - Recent VNS  adjustment  - No clear indication for video EEG at this time  - continue to monitor and notify neurology of change in exam/seizure  - Ensure K>4, Mg> 2 and Ca>9  - Ativan 2mg IV PRN for generalized seizures lasting > 5 minutes or > 3 partial seizures in 1h without return to baseline  - Avoid epileptogenic medications such as wellbutrin, cefepime, ultram, quinolones, and imipenum  - seizure precautions with padded bed rails  - Defer infectious and metabolic management per primary team  Positive blood culture      Patient has outpatient neurology appointment scheduled with Dr. España 10/25/24    Review of Systems   Unable to perform ROS: Patient nonverbal       Objective      Temp:  [97.4 °F (36.3 °C)-98 °F (36.7 °C)] 98 °F (36.7 °C)  HR:  [61-87] 61  Resp:  [16-37] 16  BP: (76-94)/(48-69) 91/61  O2 Device: None (Room air)          I/O last 24 hours:  In: 700 [I.V.:700]  Out: -   Lines/Drains/Airways       Active Status       Name Placement date Placement time Site Days    Gastrostomy/Enterostomy Percutaneous Interventional Gastrostomy 16 Fr. LUQ 06/21/24  0836  LUQ  97                  Physical Exam  Vitals reviewed.   Constitutional:       General: She is not in acute distress.     Appearance: She is ill-appearing.      Neurologic Exam     Mental Status   Limited exam due to poor baseline  Awake, non verbal, make some moaning noises   Face symmetric  Tends to examiner.  Does not FC  Spontaneous movements BUE  BLE contracted in flexed position  No visible tremor or seizure like activity       Lab Results: I personally reviewed pertinent labs  Imaging Review: I personally reviewed pertinent neuroimaging    VTE Pharmacologic Prophylaxis: Sequential compression device (Venodyne)     Assessment, images and plan reviewed with Dr. Brewer. Plan discussed with primary service. Please refer to attending attestation for additional recommendations

## 2024-09-26 NOTE — PLAN OF CARE
Problem: Nutrition/Hydration-ADULT  Goal: Nutrient/Hydration intake appropriate for improving, restoring or maintaining nutritional needs  Description: Monitor and assess patient's nutrition/hydration status for malnutrition. Collaborate with interdisciplinary team and initiate plan and interventions as ordered.  Monitor patient's weight and dietary intake as ordered or per policy. Utilize nutrition screening tool and intervene as necessary. Determine patient's food preferences and provide high-protein, high-caloric foods as appropriate.     INTERVENTIONS:  - Monitor oral intake, urinary output, labs, and treatment plans  - Assess nutrition and hydration status and recommend course of action  - Evaluate amount of meals eaten  - Assist patient with eating if necessary   - Allow adequate time for meals  - Recommend/ encourage appropriate diets, oral nutritional supplements, and vitamin/mineral supplements  - Order, calculate, and assess calorie counts as needed  - Recommend, monitor, and adjust tube feedings and TPN/PPN based on assessed needs  - Assess need for intravenous fluids  - Provide specific nutrition/hydration education as appropriate  - Include patient/family/caregiver in decisions related to nutrition  9/26/2024 0154 by Minal Felix RN  Outcome: Progressing  9/26/2024 0153 by Minal Felix RN  Outcome: Progressing  9/26/2024 0149 by Minal Felix RN  Outcome: Progressing     Problem: SAFETY,RESTRAINT: NV/NON-SELF DESTRUCTIVE BEHAVIOR  Goal: Remains free of harm/injury (restraint for non violent/non self-detsructive behavior)  Description: INTERVENTIONS:  - Instruct patient/family regarding restraint use   - Assess and monitor physiologic and psychological status   - Provide interventions and comfort measures to meet assessed patient needs   - Identify and implement measures to help patient regain control  - Assess readiness for release of restraint   9/26/2024 0154 by Minal Felix RN  Outcome:  Progressing  9/26/2024 0153 by Minal Felix RN  Outcome: Progressing  Goal: Returns to optimal restraint-free functioning  Description: INTERVENTIONS:  - Assess the patient's behavior and symptoms that indicate continued need for restraint  - Identify and implement measures to help patient regain control  - Assess readiness for release of restraint   9/26/2024 0154 by Minal Felix RN  Outcome: Progressing  9/26/2024 0153 by Minal Felix RN  Outcome: Progressing     Problem: SAFETY,RESTRAINT: NV/NON-SELF DESTRUCTIVE BEHAVIOR  Goal: Returns to optimal restraint-free functioning  Description: INTERVENTIONS:  - Assess the patient's behavior and symptoms that indicate continued need for restraint  - Identify and implement measures to help patient regain control  - Assess readiness for release of restraint   9/26/2024 0154 by Minal Felix RN  Outcome: Progressing  9/26/2024 0153 by Minal Felix RN  Outcome: Progressing

## 2024-09-26 NOTE — ASSESSMENT & PLAN NOTE
Maira Bull is a 44 y.o.  female with Lennox Gestraut, S/P vagus nerve stimulator placement (generator replaced 8/12/24), cerebral anoxic brain injury, dysphagia s/p peg who presented to [facility] ED 9/23/24 from SNF with intractable seizures.  Noted to have hypotension upon arrival with possible dehydration.  Also possible UTI leading to worsening seizures with LGS.     Patient was transferred to [facility] 9/25 for video EEG monitoring however, she has since returned to baseline following increase of outpatient breviact.    Previous EEG studies show both generalized onset and potential risk for focal onset seizures. Semiology described as drop attacks with grunting sounds, RUE episodic spasms/myoclonic jerking, generalized convulsions, tonic seizures with eyes rolling back primarily during sleep    At present, concern for possible g-tube malposition. Patient received PM AEDs, pending IR evaluation this AM.Unfortunately only 1 of her 6 AEDs (vimpat) are able to be converted to IV per pharmacy. Recommend temprorary NGT placement for medication administration in the interim. Appears at baseline at this time    Neuroimaging/work-up:  9/23 CTH without acute intracranial findings  9/23 lacosamide level 5.28, clobazam, banzel and topiramate levels pending  9/24 UA +, culture pending  9/25 Blood cultures 1/2 +GPC in clusters; coag neg staph    Recommendations:  - Briviact increased to 100mg BID, continue same  - Continue rest of home seizure medications.   - Banzel 400mg give 4 tabs twice a day   - Topiramate 250mg tab twice a day   - Epidiolex 500mg twice a day - ordered as non-formulary  - Lacosamide 200-200  - Onfi 10mg twice a day   - Recent VNS adjustment  - No clear indication for video EEG at this time  - continue to monitor and notify neurology of change in exam/seizure  - Ensure K>4, Mg> 2 and Ca>9  - Ativan 2mg IV PRN for generalized seizures lasting > 5 minutes or > 3 partial seizures in 1h without return to  baseline  - Avoid epileptogenic medications such as wellbutrin, cefepime, ultram, quinolones, and imipenum  - seizure precautions with padded bed rails  - Defer infectious and metabolic management per primary team

## 2024-09-26 NOTE — PROGRESS NOTES
Progress Note - Hospitalist   Name: Maira Bull 44 y.o. female I MRN: [id number]  Unit/Bed#: [facility] 713-01 I Date of Admission: 9/25/2024   Date of Service: 9/26/2024 I Hospital Day: 1    Assessment & Plan  Intractable Lennox-Gastaut syndrome with breakthrough seizures  Pt presented to [facility] for intractable seizures   CT head unremarkable  Possible UTI, (+) UA, awaiting urine culture results, plan for this as noted below  Most recent hospital stay at [facility] from 8/7-8/13 for VNS battery exchange and recent setting changes by neurology 9/19/2024.    Transferred to [facility] for vEEG monitoring   Neurology consulted, appreciate recommendations   Briviact increased to 100 mg BID, continue   Continue rest of home seizure medications of clobazam 10 mg BID, lacosamide 200 mg twice daily, rufinamide 1600 mg twice daily, topiramate 250 mg twice daily.  Defer adjustment of antiepileptics to neurology service  Seizure precautions  Follow up topiramate, rufinamide and clobazam levels  Lacosamide level WNL  Cerebral anoxic injury (HCC)  History of cerebral anoxic injury, nonverbal at baseline, patient with significant debility and is a nursing home resident.   Continue with supportive care    Moderate protein-calorie malnutrition (HCC)  G tube in place, concern for possible dislodgement of tube overnight   Discuss with IR to evaluate tube  Currently tube feeds/meds on hold, resume as soon as possible   Tube feeds per nutrition recommendations   BMI of 19.81  Acute cystitis  UA (+) for leukocytes and nitrates and urine microscopy positive for innumerable bacteria and leukocytes  Urine culture pending, follow up results  Continue IV ceftriaxone for now pending cultures   1/2 BC (+) for gram positive cocci in clusters, likely contaminant and will follow  Hemodynamically stable and afebrile otherwise     Positive blood culture  1/2 BC (+) for gram positive cocci in clusters, likely contaminant   Follow additional  cultures   Continue IV abx for possible UTI as above   Trend closely    VTE Pharmacologic Prophylaxis:    Pt low risk, SCD    Mobility:   Basic Mobility Inpatient Raw Score: 6  JH-HLM Goal: 2: Bed activities/Dependent transfer  JH-HLM Achieved: 1: Laying in bed  JH-HLM Goal achieved. Continue to encourage appropriate mobility.    Patient Centered Rounds: I performed bedside rounds with nursing staff today.   Discussions with Specialists or Other Care Team Provider: Discussed with RN, CM, neurology, IR and reviewed previous notes     Education and Discussions with Family / Patient:  Will update patient's father over the phone for update.     Current Length of Stay: 1 day(s)  Current Patient Status: Inpatient   Certification Statement: The patient will continue to require additional inpatient hospital stay due to neurology recommendations, IR evaluation of PEG site, treatment of UTI  Discharge Plan: Anticipate discharge in 48-72 hrs to prior assisted or independent living facility.    Code Status: Level 1 - Full Code    Subjective   Pt non verbal. Resting in bed comfortably.    Objective     Vitals:   Temp (24hrs), Av.6 °F (36.4 °C), Min:97 °F (36.1 °C), Max:98 °F (36.7 °C)    Temp:  [97 °F (36.1 °C)-98 °F (36.7 °C)] 98 °F (36.7 °C)  HR:  [61-87] 61  Resp:  [14-58] 16  BP: (76-95)/(48-69) 91/61  SpO2:  [94 %-99 %] 98 %  Body mass index is 19.81 kg/m².     Input and Output Summary (last 24 hours):     Intake/Output Summary (Last 24 hours) at 2024 0850  Last data filed at 2024 0632  Gross per 24 hour   Intake 700 ml   Output --   Net 700 ml       Physical Exam  Vitals reviewed.   Constitutional:       Comments: Pt is in no acute distress lying in her hospital bed resting comfortably   Soft limb restraints in place, non verbal   Cardiovascular:      Rate and Rhythm: Normal rate and regular rhythm.   Pulmonary:      Effort: No respiratory distress.      Breath sounds: Normal breath sounds. No wheezing.    Abdominal:      General: Bowel sounds are normal.      Palpations: Abdomen is soft.      Comments: PEG tube in place, held down by tape, no surrounding erythema or tenderness    Musculoskeletal:      Right lower leg: No edema.      Left lower leg: No edema.   Skin:     General: Skin is warm and dry.          Lines/Drains:  Lines/Drains/Airways       Active Status       Name Placement date Placement time Site Days    Gastrostomy/Enterostomy Percutaneous Interventional Gastrostomy 16 Fr. LUQ 06/21/24  0836  LUQ  97                            Lab Results: I have reviewed the following results: CBC/BMP:   .     09/26/24  0633   WBC 6.09   HGB 11.1*   HCT 34.4*      SODIUM 140   K 3.5   *   CO2 22   BUN 5   CREATININE 0.33*   GLUC 89       Results from last 7 days   Lab Units 09/26/24  0633 09/25/24  1436   WBC Thousand/uL 6.09 8.60   HEMOGLOBIN g/dL 11.1* 10.8*   HEMATOCRIT % 34.4* 32.6*   PLATELETS Thousands/uL 180 158   SEGS PCT %  --  57   LYMPHO PCT %  --  30   MONO PCT %  --  8   EOS PCT %  --  4     Results from last 7 days   Lab Units 09/26/24  0633 09/24/24  2325 09/23/24  1843   SODIUM mmol/L 140   < > 139   POTASSIUM mmol/L 3.5   < > 3.6   CHLORIDE mmol/L 111*   < > 108   CO2 mmol/L 22   < > 21   BUN mg/dL 5   < > 17   CREATININE mg/dL 0.33*   < > 0.49*   ANION GAP mmol/L 7   < > 10   CALCIUM mg/dL 8.9   < > 10.2   ALBUMIN g/dL  --   --  4.7   TOTAL BILIRUBIN mg/dL  --   --  0.44   ALK PHOS U/L  --   --  96   ALT U/L  --   --  28   AST U/L  --   --  23   GLUCOSE RANDOM mg/dL 89   < > 122    < > = values in this interval not displayed.                 Results from last 7 days   Lab Units 09/25/24  0030 09/24/24  2325   LACTIC ACID mmol/L 1.8  --    PROCALCITONIN ng/ml  --  <0.05       Recent Cultures (last 7 days):   Results from last 7 days   Lab Units 09/25/24  0030   BLOOD CULTURE  No Growth at 24 hrs.   GRAM STAIN RESULT  Gram positive cocci in clusters*       Imaging Review: Reviewed  radiology reports from this admission including: chest xray and CT head.  Other Studies: No additional pertinent studies reviewed.    Last 24 Hours Medication List:     Current Facility-Administered Medications:     Brivaracetam (BRIVIACT) oral solution 100 mg, BID    cloBAZam (ONFI) tablet 10 mg, Q12H NANDO    lacosamide (VIMPAT) injection 200 mg, Q12H    lactated ringers infusion, Continuous, Last Rate: 150 mL/hr (09/26/24 0633)    Rufinamide SUSP 1,600 mg, Q12H NANDO    topiramate (TOPAMAX) tablet 250 mg, BID    Administrative Statements   Today, Patient Was Seen By: Krysten Acevedo PA-C    **Please Note: This note may have been constructed using a voice recognition system.**

## 2024-09-26 NOTE — PLAN OF CARE
Problem: NEUROSENSORY - ADULT  Goal: Remains free of injury related to seizures activity  Description: INTERVENTIONS  - Maintain airway, patient safety  and administer oxygen as ordered  - Monitor patient for seizure activity, document and report duration and description of seizure to physician/advanced practitioner  - If seizure occurs,  ensure patient safety during seizure  - Reorient patient post seizure  - Seizure pads on all 4 side rails  - Instruct patient/family to notify RN of any seizure activity including if an aura is experienced  - Instruct patient/family to call for assistance with activity based on nursing assessment  - Administer anti-seizure medications if ordered    Outcome: Progressing

## 2024-09-26 NOTE — ASSESSMENT & PLAN NOTE
History of cerebral anoxic injury, nonverbal at baseline, patient with significant debility and is a nursing home resident.   Continue with supportive care

## 2024-09-26 NOTE — ASSESSMENT & PLAN NOTE
1/2 BC (+) for gram positive cocci in clusters, likely contaminant   Follow additional cultures   Continue IV abx for possible UTI as above   Trend closely

## 2024-09-26 NOTE — BRIEF OP NOTE (RAD/CATH)
INTERVENTIONAL RADIOLOGY PROCEDURE NOTE    Date: 9/26/2024    Procedure: Gastrostomy tube exchange  Procedure Summary       Date:  Room / Location:     Anesthesia Start:  Anesthesia Stop:     Procedure:  Diagnosis:     Scheduled Providers:  Responsible Provider:     Anesthesia Type: Not recorded ASA Status: Not recorded            Preoperative diagnosis:   1. Intellectual disability with epilepsy (HCC)    2. PEG tube malfunction (HCC)         Postoperative diagnosis: Same.    Surgeon: Patti Mcallister MD     Assistant: None. No qualified resident was available.    Blood loss: 0    Specimens: 0     Findings:     Gastrostomy tube exchanged for a new tube    Existing tube seemed to be in good position but patient was already due for exchange    Complications: None immediate.    Anesthesia: none

## 2024-09-26 NOTE — ASSESSMENT & PLAN NOTE
UA (+) for leukocytes and nitrates and urine microscopy positive for innumerable bacteria and leukocytes  Urine culture pending, follow up results  Continue IV ceftriaxone for now pending cultures   1/2 BC (+) for gram positive cocci in clusters, likely contaminant and will follow  Hemodynamically stable and afebrile otherwise

## 2024-09-26 NOTE — ASSESSMENT & PLAN NOTE
Pt presented to [facility] for intractable seizures   CT head unremarkable  Possible UTI, (+) UA, awaiting urine culture results, plan for this as noted below  Most recent hospital stay at [facility] from 8/7-8/13 for VNS battery exchange and recent setting changes by neurology 9/19/2024.    Transferred to [facility] for vEEG monitoring   Neurology consulted, appreciate recommendations   Briviact increased to 100 mg BID, continue   Continue rest of home seizure medications of clobazam 10 mg BID, lacosamide 200 mg twice daily, rufinamide 1600 mg twice daily, topiramate 250 mg twice daily.  Defer adjustment of antiepileptics to neurology service  Seizure precautions  Follow up topiramate, rufinamide and clobazam levels  Lacosamide level WNL

## 2024-09-26 NOTE — PROGRESS NOTES
Spoke with Debra KOCH who is the director of nursing at [facility]. Home TF regimen is Jevity 1.2 @ 52 mL/hr x 20 hours. Tube feed runs from 12 PM-8 AM. Water flushes of 100 mL q6h and 60 mL water flushes before and after medication administration. Pleasure feeds of puree and NTL. NKFA.     When able to resume tube feeds, recommend Jevity 1.2 @ 52 mL/hr x 20 hours. Run tube feed from 12 PM - 8 AM. Provides 1040 mL formula, 1248 calories, 58g protein, 839 mL free water. Flushes of 100 mL q6h would provide 1239 mL total water and meet fluid needs if euvolemic and absent of IV fluids. Pended order reflective of recommendation.

## 2024-09-26 NOTE — MALNUTRITION/BMI
This medical record reflects low BMI    BMI Findings:  Adult BMI Classifications: Underweight < 18.5, considered underweight.        Body mass index is 18.13 kg/m².     See Nutrition note dated 9/25/2024 for additional details.  Completed nutrition assessment is viewable in the nutrition documentation.

## 2024-09-26 NOTE — CASE MANAGEMENT
Case Management Discharge Planning Note    Patient name Maira Bull  Location / MRN [id number]  : 1980 Date 2024       Current Admission Date: 2024  Current Admission Diagnosis:Intractable Lennox-Gastaut syndrome with breakthrough seizures   Patient Active Problem List    Diagnosis Date Noted Date Diagnosed    Hypotension due to hypovolemia 2024     Acute cystitis 2024     Fracture of tooth 2024     PEG tube malfunction (HCC) 2020     Labyrinthine disorder 03/10/2020     Intellectual disability with epilepsy (HCC) 12/10/2019     Fatty infiltration of liver 2019     Moderate protein-calorie malnutrition (HCC) 2019     Dislodged gastrostomy tube 2019     S/P placement of VNS (vagus nerve stimulation) device 2019     Breast mass 01/15/2019     Sedated due to multiple medications 01/10/2019     Hypophosphatemia 2018     Right atrial enlargement 10/13/2018     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     Skin breakdown 10/09/2018     Incontinence 2018     Low blood pressure 2018     Somnolence 2018     Intractable Lennox-Gastaut syndrome with breakthrough seizures 2018     Labial cyst 2017     Osteoporosis 2013     Onychomycosis 2013     Hyperkeratosis 2013     Cerebral anoxic injury (HCC) 2013       LOS (days): 1  Geometric Mean LOS (GMLOS) (days): 4.5  Days to GMLOS:4.2     OBJECTIVE:  Risk of Unplanned Readmission Score: 13.2         Current admission status: Inpatient   Preferred Pharmacy:   [email or web address] [id number] [address], [place]  [address] [address]  Phone: [phone] Fax: [phone]    [facility] - [place], [place] - [address]  [address]  [address]  [facility] [address]  Phone: [phone] Fax: [phone]    [facility] [place] - [address]  [address]  [facility] [address]  Phone: [phone] Fax: [phone]    [place], [place] - [address]  [address] [address]  Phone: [phone] Fax: [phone]    [facility] [place]  [place] - [address]  [address] [place] [address]  Phone: [phone] Fax: [phone]    [id number] - [place], [place] - [address]  [address] [address]  Phone: [phone] Fax: [phone]    Primary Care Provider: Doe Miller DO    Primary Insurance: MEDICARE  Secondary Insurance: [facility]    DISCHARGE DETAILS:  Late entry:Pt was transferred to [facility] prior to being opened by CM.

## 2024-09-26 NOTE — SEDATION DOCUMENTATION
G tube exchange performed by Dr donnelly without complication. Patient tolerated procedure well. Report called to primary nurse.

## 2024-09-26 NOTE — PROGRESS NOTES
Reported to Dr. Jiménez that patients peg tube was loose and possibly dislodged.  Verbal order to hold tube feeds until day time assess peg.

## 2024-09-26 NOTE — ASSESSMENT & PLAN NOTE
Malnutrition Findings:                                 BMI Findings:           Body mass index is 19.81 kg/m².

## 2024-09-26 NOTE — PLAN OF CARE
Problem: Prexisting or High Potential for Compromised Skin Integrity  Goal: Skin integrity is maintained or improved  Description: INTERVENTIONS:  - Identify patients at risk for skin breakdown  - Assess and monitor skin integrity  - Assess and monitor nutrition and hydration status  - Monitor labs   - Assess for incontinence   - Turn and reposition patient  - Assist with mobility/ambulation  - Relieve pressure over bony prominences  - Avoid friction and shearing  - Provide appropriate hygiene as needed including keeping skin clean and dry  - Evaluate need for skin moisturizer/barrier cream  - Collaborate with interdisciplinary team   - Patient/family teaching  - Consider wound care consult   Outcome: Progressing     Problem: INFECTION - ADULT  Goal: Absence or prevention of progression during hospitalization  Description: INTERVENTIONS:  - Assess and monitor for signs and symptoms of infection  - Monitor lab/diagnostic results  - Monitor all insertion sites, i.e. indwelling lines, tubes, and drains  - Monitor endotracheal if appropriate and nasal secretions for changes in amount and color  - [place] appropriate cooling/warming therapies per order  - Administer medications as ordered  - Instruct and encourage patient and family to use good hand hygiene technique  - Identify and instruct in appropriate isolation precautions for identified infection/condition  Outcome: Progressing  Goal: Absence of fever/infection during neutropenic period  Description: INTERVENTIONS:  - Monitor WBC    Outcome: Progressing     Problem: SAFETY ADULT  Goal: Patient will remain free of falls  Description: INTERVENTIONS:  - Educate patient/family on patient safety including physical limitations  - Instruct patient to call for assistance with activity   - Consult OT/PT to assist with strengthening/mobility   - Keep Call bell within reach  - Keep bed low and locked with side rails adjusted as appropriate  - Keep care items and personal  belongings within reach  - Initiate and maintain comfort rounds  - Make Fall Risk Sign visible to staff  - Offer Toileting every 2 Hours, in advance of need  - Initiate/Maintain bed alarm  - Obtain necessary fall risk management equipment:    Problem: DISCHARGE PLANNING  Goal: Discharge to home or other facility with appropriate resources  Description: INTERVENTIONS:  - Identify barriers to discharge w/patient and caregiver  - Arrange for needed discharge resources and transportation as appropriate  - Identify discharge learning needs (meds, wound care, etc.)  - Arrange for interpretive services to assist at discharge as needed  - Refer to Case Management Department for coordinating discharge planning if the patient needs post-hospital services based on physician/advanced practitioner order or complex needs related to functional status, cognitive ability, or social support system  Outcome: Progressing     Problem: Knowledge Deficit  Goal: Patient/family/caregiver demonstrates understanding of disease process, treatment plan, medications, and discharge instructions  Description: Complete learning assessment and assess knowledge base.  Interventions:  - Provide teaching at level of understanding  - Provide teaching via preferred learning methods  Outcome: Progressing     Problem: NEUROSENSORY - ADULT  Goal: Remains free of injury related to seizures activity  Description: INTERVENTIONS  - Maintain airway, patient safety  and administer oxygen as ordered  - Monitor patient for seizure activity, document and report duration and description of seizure to physician/advanced practitioner  - If seizure occurs,  ensure patient safety during seizure  - Reorient patient post seizure  - Seizure pads on all 4 side rails  - Instruct patient/family to notify RN of any seizure activity including if an aura is experienced  - Instruct patient/family to call for assistance with activity based on nursing assessment  - Administer  anti-seizure medications if ordered    Outcome: Progressing     Problem: SKIN/TISSUE INTEGRITY - ADULT  Goal: Skin Integrity remains intact(Skin Breakdown Prevention)  Description: Assess:  -Perform Tyson assessment every 2 hours  -Clean and moisturize skin every  2 hours  -Inspect skin when repositioning, toileting, and assisting with ADLS  -Assess under medical devices such as restraints every 2 hours  -Assess extremities for adequate circulation and sensation     Bed Management:  -Have minimal linens on bed & keep smooth, unwrinkled  -Change linens as needed when moist or perspiring  -Avoid sitting or lying in one position for more than 2 hours while in bed  -Keep HOB at 30 degrees     Toileting:  -Offer bedside commode  -Assess for incontinence every 2 hours  -Use incontinent care products after each incontinent episode such as pads, barrier cream    Activity:  -Mobilize patient   times a day  -Encourage activity and walks on unit  -Encourage or provide ROM exercises   -Turn and reposition patient every 2 Hours  -Use appropriate equipment to lift or move patient in bed  -Instruct/ Assist with weight shifting every   when out of bed in chair  -Consider limitation of chair time   hour intervals    Skin Care:  -Avoid use of baby powder, tape, friction and shearing, hot water or constrictive clothing  -Relieve pressure over bony prominences using foam wedges  -Do not massage red bony areas    Next Steps:  -Teach patient strategies to minimize risks such as skin breakdown   -Consider consults to  interdisciplinary teams such as    Outcome: Progressing     Problem: Nutrition/Hydration-ADULT  Goal: Nutrient/Hydration intake appropriate for improving, restoring or maintaining nutritional needs  Description: Monitor and assess patient's nutrition/hydration status for malnutrition. Collaborate with interdisciplinary team and initiate plan and interventions as ordered.  Monitor patient's weight and dietary intake as  ordered or per policy. Utilize nutrition screening tool and intervene as necessary. Determine patient's food preferences and provide high-protein, high-caloric foods as appropriate.     INTERVENTIONS:  - Monitor oral intake, urinary output, labs, and treatment plans  - Assess nutrition and hydration status and recommend course of action  - Evaluate amount of meals eaten  - Assist patient with eating if necessary   - Allow adequate time for meals  - Recommend/ encourage appropriate diets, oral nutritional supplements, and vitamin/mineral supplements  - Order, calculate, and assess calorie counts as needed  - Recommend, monitor, and adjust tube feedings and TPN/PPN based on assessed needs  - Assess need for intravenous fluids  - Provide specific nutrition/hydration education as appropriate  - Include patient/family/caregiver in decisions related to nutrition  Outcome: Progressing     Problem: Nutrition/Hydration-ADULT  Goal: Nutrient/Hydration intake appropriate for improving, restoring or maintaining nutritional needs  Description: Monitor and assess patient's nutrition/hydration status for malnutrition. Collaborate with interdisciplinary team and initiate plan and interventions as ordered.  Monitor patient's weight and dietary intake as ordered or per policy. Utilize nutrition screening tool and intervene as necessary. Determine patient's food preferences and provide high-protein, high-caloric foods as appropriate.     INTERVENTIONS:  - Monitor oral intake, urinary output, labs, and treatment plans  - Assess nutrition and hydration status and recommend course of action  - Evaluate amount of meals eaten  - Assist patient with eating if necessary   - Allow adequate time for meals  - Recommend/ encourage appropriate diets, oral nutritional supplements, and vitamin/mineral supplements  - Order, calculate, and assess calorie counts as needed  - Recommend, monitor, and adjust tube feedings and TPN/PPN based on assessed  needs  - Assess need for intravenous fluids  - Provide specific nutrition/hydration education as appropriate  - Include patient/family/caregiver in decisions related to nutrition  Outcome: Progressing     - Apply yellow socks and bracelet for high fall risk patients  - Consider moving patient to room near nurses station  Outcome: Progressing

## 2024-09-26 NOTE — CONSULTS
Interventional Radiology  Consultation 9/26/2024     Inpatient Consult to IR  Consult performed by: KATHY Moore  Consult ordered by: WAGNER Ayoub   1980   [id number]      Assessment/Plan:  43 yo female non-verbal with intractable Lennox-Gastaut syndrome with breakthrough seizures with VNS in place and on multiple antiepileptics, and h/o anoxic injury who was transferred from [facility] for Neurology evaluation and possible EEG monitoring.    RN overnight noted patient gastrostomy tube bumper away from insertion site and was concerned for dislodgement. IR consulted of evaluation.    On exam, Patient gastrostomy 16F Enfit with tube marking at 3 at the skin bumper at 5. No residual on aspiration by RN. Patient appears to have pain with palpation of abdomen and with movement of tube.     Discussed findings with IR Attending Dr Mcallister and Dr Acevedo    -Plan for IR gastrostomy tube check/repostition/exchange today pending IR schedule   -Neurology following, appreciate recommendations  -remainder of care per Primary team    Medical Problems       Problem List       * (Principal) Intractable Lennox-Gastaut syndrome with breakthrough seizures    Positive blood culture    Osteoporosis    Onychomycosis    Hyperkeratosis    Cerebral anoxic injury (HCC)    Somnolence    Low blood pressure    Incontinence    Skin breakdown    MRSA colonization    Right atrial enlargement    Hypophosphatemia    Sedated due to multiple medications    Breast mass    S/P placement of VNS (vagus nerve stimulation) device    Moderate protein-calorie malnutrition (HCC)    Malnutrition Findings:                                 BMI Findings:           Body mass index is 19.81 kg/m².         Dislodged gastrostomy tube    Fatty infiltration of liver    Intellectual disability with epilepsy (HCC)    Labyrinthine disorder    Labial cyst    PEG tube malfunction (HCC)    Fracture of tooth    Hypotension due to hypovolemia     Acute cystitis            Subjective:     Patient ID: Maira Bull is a 44 y.o. female.    History of Present Illness  45 yo female non-verbal with intractable Lennox-Gastaut syndrome with breakthrough seizures with VNS in place and on multiple antiepileptics, and h/o anoxic injury who was transferred from [facility] for Neurology evaluation and possible EEG monitoring.    RN overnight noted patient gastrostomy tube bumper away from insertion site and was concerned for dislodgement. IR consulted of evaluation.    On exam, Patient gastrostomy 16F Enfit with tube marking at 3 at the skin bumper at 5. No residual on aspiration by RN. Patient appears to have pain with palpation of abdomen and with movement of tube.     Review of Systems   Unable to perform ROS: Patient nonverbal         Past Medical History:   Diagnosis Date    ADHD     Anoxic brain damage (HCC)     Autistic disorder     Breakthrough seizure (HCC) 8/1/2019    Dysphagia     Dysphagia, oropharyngeal phase     Gastrostomy tube dependent (HCC)     14 Fr as of 05/19/2020    Hyperammonemia (HCC)     Hyperkeratosis     Hypotension     Intellectual disability     Lennox-Gastaut syndrome with tonic seizures (HCC)     Lethargy     Liver enzyme elevation     Onychomycosis     Osteoporosis     Osteoporosis         Past Surgical History:   Procedure Laterality Date    ABDOMINAL SURGERY      CARDIAC PACEMAKER PLACEMENT      vns implant l chest    IR GASTROJEJUNOSTOMY (GJ) TUBE PLACEMENT  12/8/2023    IR GASTROSTOMY (G) TUBE CHECK/CHANGE/REINSERTION/UPSIZE  6/21/2024    IR GASTROSTOMY TUBE PLACEMENT  11/21/2019    IR THORACENTESIS  12/17/2018    JEJUNOSTOMY FEEDING TUBE      history of - most recently, PEG tube    PEG TUBE PLACEMENT      KS INSJ/RPLCMT CRANIAL NEUROSTIM PULSE GENERATOR Left 12/18/2019    Procedure: REPLACEMENT IMPLANTABLE PULSE GENERATOR FOR VAGAL NERVE STIMULATOR, LEFT CHEST;  Surgeon: Patrick Canales MD;  Location: [facility];  Service:  Neurosurgery    VAGAL NERVE STIMULATOR (VNS) PLACEMENT Left 8/12/2024    Procedure: Reopening of the left chest incision for placement of implantable pulse generator for vagal nerve stimulator;  Surgeon: Charles Pendleton MD;  Location: [facility];  Service: Neurosurgery        Social History     Tobacco Use   Smoking Status Never   Smokeless Tobacco Never        Social History     Substance and Sexual Activity   Alcohol Use Not Currently        Social History     Substance and Sexual Activity   Drug Use Not Currently        Allergies   Allergen Reactions    Felbamate        Current Facility-Administered Medications   Medication Dose Route Frequency Provider Last Rate Last Admin    Brivaracetam (BRIVIACT) oral solution 100 mg  100 mg Per PEG Tube BID Andi Jiménez MD        cannabidiol (EPIDIOLEX) oral solution 100 mg  1 mL Per PEG Tube BID KATHY Meier        And    cannabidiol (EPIDIOLEX) oral solution 400 mg  4 mL Per PEG Tube BID KATHY Meier        cefTRIAXone (ROCEPHIN) 1,000 mg in dextrose 5 % 50 mL IVPB  1,000 mg Intravenous Q24H Krysten Acevedo PA-C        cloBAZam (ONFI) tablet 10 mg  10 mg Per G Tube Q12H NANDO Jiménez MD   10 mg at 09/25/24 2141    lacosamide (VIMPAT) injection 200 mg  200 mg Intravenous Q12H Andi Jiménez MD   200 mg at 09/25/24 2310    Rufinamide SUSP 1,600 mg  1,600 mg Per PEG Tube Q12H NANDO Jiménez MD        topiramate (TOPAMAX) tablet 250 mg  250 mg Oral BID Andi Jiménez MD              Objective:    Vitals:    09/25/24 2229 09/25/24 2230 09/26/24 0500 09/26/24 0705   BP: 93/61 93/61  91/61   BP Location:    Left arm   Pulse: 72 74  61   Resp: 16   16   Temp:    98 °F (36.7 °C)   TempSrc:    Axillary   SpO2: 99% 98%  98%   Weight:   46 kg (101 lb 6.6 oz)    Height:            Physical Exam  Constitutional:       General: She is not in acute distress.  Eyes:      Conjunctiva/sclera: Conjunctivae normal.   Cardiovascular:      Rate and Rhythm: Normal rate and  "regular rhythm.   Pulmonary:      Effort: Pulmonary effort is normal. No respiratory distress.   Abdominal:      General: There is no distension.      Palpations: Abdomen is soft.      Tenderness: There is abdominal tenderness.      Comments: 16 F gastronomy tube at 3 with bumper at 5 away from skin. No visible leaking but patient with pain with palpation of abdomen and with maneuvering of G tube   Skin:     General: Skin is warm and dry.   Neurological:      Mental Status: She is alert. Mental status is at baseline.           No results found for: \"BNP\"   Lab Results   Component Value Date    WBC 6.09 09/26/2024    HGB 11.1 (L) 09/26/2024    HCT 34.4 (L) 09/26/2024     (H) 09/26/2024     09/26/2024     Lab Results   Component Value Date    INR 1.02 08/13/2024    INR 1.04 08/12/2024    INR 1.04 07/31/2019    PROTIME 13.7 08/13/2024    PROTIME 13.9 08/12/2024    PROTIME 13.6 07/31/2019     Lab Results   Component Value Date    PTT 26 08/13/2024       I have personally reviewed pertinent imaging and laboratory results.     Code Status: Level 1 - Full Code  Advance Directive and Living Will: Yes      IR has been consulted to evaluate the patient, determine the appropriate procedure, and whether or not a procedure can and should be performed.    Thank you for allowing me to participate in the care of Maira Bull. Please don't hesitate to call, text, email, or Message us with any questions.            "

## 2024-09-26 NOTE — CONSULTS
NEUROLOGY RESIDENCY CONSULT NOTE     Resident note, please see progress note dated 9/26/24    Inpatient consult to Neurology  Consult performed by: Macrina Hollis MD  Consult ordered by: Andi Jiménez MD

## 2024-09-26 NOTE — ASSESSMENT & PLAN NOTE
G tube in place, concern for possible dislodgement of tube overnight   Discuss with IR to evaluate tube  Currently tube feeds/meds on hold, resume as soon as possible   Tube feeds per nutrition recommendations   BMI of 19.81

## 2024-09-27 PROBLEM — I95.9 HYPOTENSION: Status: ACTIVE | Noted: 2024-09-25

## 2024-09-27 LAB
ANION GAP SERPL CALCULATED.3IONS-SCNC: 5 MMOL/L (ref 4–13)
BACTERIA UR CULT: ABNORMAL
BUN SERPL-MCNC: 8 MG/DL (ref 5–25)
CALCIUM SERPL-MCNC: 8.5 MG/DL (ref 8.4–10.2)
CHLORIDE SERPL-SCNC: 114 MMOL/L (ref 96–108)
CO2 SERPL-SCNC: 21 MMOL/L (ref 21–32)
CREAT SERPL-MCNC: 0.34 MG/DL (ref 0.6–1.3)
ERYTHROCYTE [DISTWIDTH] IN BLOOD BY AUTOMATED COUNT: 12.5 % (ref 11.6–15.1)
GFR SERPL CREATININE-BSD FRML MDRD: 135 ML/MIN/1.73SQ M
GLUCOSE SERPL-MCNC: 105 MG/DL (ref 65–140)
HCT VFR BLD AUTO: 34.2 % (ref 34.8–46.1)
HGB BLD-MCNC: 10.7 G/DL (ref 11.5–15.4)
MCH RBC QN AUTO: 32 PG (ref 26.8–34.3)
MCHC RBC AUTO-ENTMCNC: 31.3 G/DL (ref 31.4–37.4)
MCV RBC AUTO: 102 FL (ref 82–98)
PLATELET # BLD AUTO: 171 THOUSANDS/UL (ref 149–390)
PMV BLD AUTO: 12.3 FL (ref 8.9–12.7)
POTASSIUM SERPL-SCNC: 4.2 MMOL/L (ref 3.5–5.3)
RBC # BLD AUTO: 3.34 MILLION/UL (ref 3.81–5.12)
SODIUM SERPL-SCNC: 140 MMOL/L (ref 135–147)
WBC # BLD AUTO: 6.36 THOUSAND/UL (ref 4.31–10.16)

## 2024-09-27 PROCEDURE — 99232 SBSQ HOSP IP/OBS MODERATE 35: CPT | Performed by: PHYSICIAN ASSISTANT

## 2024-09-27 PROCEDURE — 85027 COMPLETE CBC AUTOMATED: CPT | Performed by: PHYSICIAN ASSISTANT

## 2024-09-27 PROCEDURE — 80048 BASIC METABOLIC PNL TOTAL CA: CPT | Performed by: PHYSICIAN ASSISTANT

## 2024-09-27 PROCEDURE — C9254 INJECTION, LACOSAMIDE: HCPCS | Performed by: FAMILY MEDICINE

## 2024-09-27 RX ADMIN — CANNABIDIOL 100 MG: 100 SOLUTION ORAL at 11:36

## 2024-09-27 RX ADMIN — LACOSAMIDE 200 MG: 10 INJECTION INTRAVENOUS at 23:04

## 2024-09-27 RX ADMIN — BRIVARACETAM 100 MG: 10 SOLUTION ORAL at 11:19

## 2024-09-27 RX ADMIN — TOPIRAMATE 250 MG: 100 TABLET, FILM COATED ORAL at 11:19

## 2024-09-27 RX ADMIN — CANNABIDIOL 400 MG: 100 SOLUTION ORAL at 20:59

## 2024-09-27 RX ADMIN — BRIVARACETAM 100 MG: 10 SOLUTION ORAL at 21:00

## 2024-09-27 RX ADMIN — CEFTRIAXONE 1000 MG: 1 INJECTION, POWDER, FOR SOLUTION INTRAMUSCULAR; INTRAVENOUS at 23:04

## 2024-09-27 RX ADMIN — CANNABIDIOL 400 MG: 100 SOLUTION ORAL at 11:34

## 2024-09-27 RX ADMIN — CLOBAZAM 10 MG: 10 TABLET ORAL at 11:20

## 2024-09-27 RX ADMIN — CANNABIDIOL 100 MG: 100 SOLUTION ORAL at 21:00

## 2024-09-27 RX ADMIN — LACOSAMIDE 200 MG: 10 INJECTION INTRAVENOUS at 11:20

## 2024-09-27 RX ADMIN — TOPIRAMATE 250 MG: 100 TABLET, FILM COATED ORAL at 21:01

## 2024-09-27 RX ADMIN — CLOBAZAM 10 MG: 10 TABLET ORAL at 21:01

## 2024-09-27 NOTE — PROGRESS NOTES
Progress Note - Hospitalist   Name: Maira Bull 44 y.o. female I MRN: [id number]  [facility] Date of Admission: 9/25/2024   Date of Service: 9/27/2024 I Hospital Day: 2    Assessment & Plan  Intractable Lennox-Gastaut syndrome with breakthrough seizures  Pt presented to [facility] for intractable seizures   CT head unremarkable  Possible UTI, (+) UA, awaiting final urine culture results, plan for this as noted below  Most recent hospital stay at [facility] from 8/7-8/13 for VNS battery exchange and recent setting changes by neurology 9/19/2024.    Transferred to [facility] for vEEG monitoring   Neurology evaluated,  Likely breakthrough seizure in setting of underlying infection    Briviact increased to 100 mg BID, continue   Continue rest of home seizure medications of clobazam 10 mg BID, lacosamide 200 mg twice daily, rufinamide 1600 mg twice daily, topiramate 250 mg twice daily and Epidiolex 500 mg BID  Pt now back to baseline and therefore no indication for vEEG  Seizure precautions  Follow up topiramate, rufinamide and clobazam levels  Lacosamide level WNL  Hypotension  Noted this hospitalization, last BP check of 78/49  Recommend recheck and manual pressures   Possibly in setting of hypovolemia? Vs. Underlying infection   Pt remains afebrile  Continue IV abx as below   Continue tube feeds per nutrition, consideration for additional IVF as well   Monitor closely  Cerebral anoxic injury (HCC)  History of cerebral anoxic injury, nonverbal at baseline, patient with significant debility and is a nursing home resident.   Continue with supportive care    Moderate protein-calorie malnutrition (HCC)  G tube in place  IR evaluated on 9/26 due to concerns for dislodgement  S/p G tube exchange, now functioning properly   Continue with tube feeds per nutrition recommendations and medications through G tube  BMI of 19.81  Acute cystitis  UA (+) for leukocytes and nitrates and urine microscopy positive for  innumerable bacteria and leukocytes  Urine culture pre aponte with enterococcus and aerococcus, awaiting final sensitivities   Continue IV ceftriaxone for now pending final cultures   1/2 BC (+) for staph coag negative, likely contaminant and will follow  Hemodynamically stable and afebrile otherwise     Positive blood culture  1/2 BC (+) for staph coag negative, likely contaminant   Follow additional cultures, additional BC negative x 48 hours  Continue IV abx for UTI as above   Trend closely    VTE Pharmacologic Prophylaxis:    Low risk , SCDs    Mobility:   Basic Mobility Inpatient Raw Score: 6  -[facility] Goal: 2: Bed activities/Dependent transfer  JH-HLM Achieved: 1: Laying in bed  -HLM Goal achieved. Continue to encourage appropriate mobility.    Patient Centered Rounds: I evaluated the patient without nursing staff present due to speaking to nurse outside patient's room     Discussions with Specialists or Other Care Team Provider: Discussed with RN, ABNER and reviewed neurology notes     Education and Discussions with Family / Patient:  Will update patient's brother regarding plan of care.     Current Length of Stay: 2 day(s)  Current Patient Status: Inpatient   Certification Statement: The patient will continue to require additional inpatient hospital stay due to hypotension, urine culture results, IV abx  Discharge Plan: Anticipate discharge in 24-48 hrs to prior assisted or independent living facility.    Code Status: Level 1 - Full Code    Subjective   Pt more alert today, agitated. No acute events overnight.     Objective     Vitals:   Temp (24hrs), Av °F (36.7 °C), Min:97.7 °F (36.5 °C), Max:98.2 °F (36.8 °C)    Temp:  [97.7 °F (36.5 °C)-98.2 °F (36.8 °C)] 97.7 °F (36.5 °C)  HR:  [63-68] 68  Resp:  [16-20] 16  BP: (78-89)/(49-60) 78/49  SpO2:  [97 %-99 %] 97 %  Body mass index is 19.81 kg/m².     Input and Output Summary (last 24 hours):     Intake/Output Summary (Last 24 hours) at 2024 1052  Last data  filed at 9/26/2024 1831  Gross per 24 hour   Intake 0 ml   Output --   Net 0 ml       Physical Exam  Vitals reviewed.   Constitutional:       Comments: Pt is in no acute distress lying in her hospital bed resting comfortably   Intermittent agitation    Cardiovascular:      Rate and Rhythm: Normal rate and regular rhythm.   Pulmonary:      Effort: No respiratory distress.   Abdominal:      Comments: G tube   Musculoskeletal:      Right lower leg: No edema.      Left lower leg: No edema.   Neurological:      Mental Status: She is alert.          Lines/Drains:  Lines/Drains/Airways       Active Status       Name Placement date Placement time Site Days    Gastrostomy/Enterostomy Percutaneous Interventional Gastrostomy 16 Fr. LUQ 09/26/24  1237  LUQ  less than 1                            Lab Results: I have reviewed the following results: CBC/BMP:   .     09/27/24  0618   WBC 6.36   HGB 10.7*   HCT 34.2*      SODIUM 140   K 4.2   *   CO2 21   BUN 8   CREATININE 0.34*   GLUC 105       Results from last 7 days   Lab Units 09/27/24  0618 09/26/24  0633 09/25/24  1436   WBC Thousand/uL 6.36   < > 8.60   HEMOGLOBIN g/dL 10.7*   < > 10.8*   HEMATOCRIT % 34.2*   < > 32.6*   PLATELETS Thousands/uL 171   < > 158   SEGS PCT %  --   --  57   LYMPHO PCT %  --   --  30   MONO PCT %  --   --  8   EOS PCT %  --   --  4    < > = values in this interval not displayed.     Results from last 7 days   Lab Units 09/27/24  0618 09/24/24  2325 09/23/24  1843   SODIUM mmol/L 140   < > 139   POTASSIUM mmol/L 4.2   < > 3.6   CHLORIDE mmol/L 114*   < > 108   CO2 mmol/L 21   < > 21   BUN mg/dL 8   < > 17   CREATININE mg/dL 0.34*   < > 0.49*   ANION GAP mmol/L 5   < > 10   CALCIUM mg/dL 8.5   < > 10.2   ALBUMIN g/dL  --   --  4.7   TOTAL BILIRUBIN mg/dL  --   --  0.44   ALK PHOS U/L  --   --  96   ALT U/L  --   --  28   AST U/L  --   --  23   GLUCOSE RANDOM mg/dL 105   < > 122    < > = values in this interval not displayed.                  Results from last 7 days   Lab Units 09/25/24  0030 09/24/24  2325   LACTIC ACID mmol/L 1.8  --    PROCALCITONIN ng/ml  --  <0.05       Recent Cultures (last 7 days):   Results from last 7 days   Lab Units 09/25/24  0030 09/24/24  2220   BLOOD CULTURE  Staphylococcus coagulase negative*  No Growth at 48 hrs.  --    GRAM STAIN RESULT  Gram positive cocci in clusters*  --    URINE CULTURE   --  80,000-89,000 cfu/ml Escherichia coli*  80,000-89,000 cfu/ml Aerococcus urinae*       Imaging Review: No pertinent imaging studies reviewed.  Other Studies: No additional pertinent studies reviewed.    Last 24 Hours Medication List:     Current Facility-Administered Medications:     Brivaracetam (BRIVIACT) oral solution 100 mg, BID    cannabidiol (EPIDIOLEX) oral solution 100 mg, BID **AND** cannabidiol (EPIDIOLEX) oral solution 400 mg, BID    cefTRIAXone (ROCEPHIN) 1,000 mg in dextrose 5 % 50 mL IVPB, Q24H, Last Rate: 1,000 mg (09/26/24 2301)    cloBAZam (ONFI) tablet 10 mg, Q12H NANDO    lacosamide (VIMPAT) injection 200 mg, Q12H    Rufinamide SUSP 1,600 mg, Q12H NANDO    topiramate (TOPAMAX) tablet 250 mg, BID    Administrative Statements   Today, Patient Was Seen By: Krysten Acevedo PA-C    **Please Note: This note may have been constructed using a voice recognition system.**

## 2024-09-27 NOTE — CASE MANAGEMENT
Case Management Assessment & Discharge Planning Note    Patient name Maira Bull  Location [facility] 713/[facility] 713-01 MRN [id number]  : 1980 Date 2024       Current Admission Date: 2024  Current Admission Diagnosis:Intractable Lennox-Gastaut syndrome with breakthrough seizures   Patient Active Problem List    Diagnosis Date Noted Date Diagnosed    Hypotension due to hypovolemia 2024     Acute cystitis 2024     Fracture of tooth 2024     PEG tube malfunction (HCC) 2020     Labyrinthine disorder 03/10/2020     Intellectual disability with epilepsy (HCC) 12/10/2019     Fatty infiltration of liver 2019     Moderate protein-calorie malnutrition (HCC) 2019     Dislodged gastrostomy tube 2019     S/P placement of VNS (vagus nerve stimulation) device 2019     Breast mass 01/15/2019     Sedated due to multiple medications 01/10/2019     Hypophosphatemia 2018     Right atrial enlargement 10/13/2018     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     Skin breakdown 10/09/2018     Incontinence 2018     Low blood pressure 2018     Somnolence 2018     Intractable Lennox-Gastaut syndrome with breakthrough seizures 2018     Labial cyst 2017     Positive blood culture 2017     Osteoporosis 2013     Onychomycosis 2013     Hyperkeratosis 2013     Cerebral anoxic injury (HCC) 2013       LOS (days): 2  Geometric Mean LOS (GMLOS) (days): 2.7  Days to GMLOS:1.2     OBJECTIVE:    Risk of Unplanned Readmission Score: 15.86         Current admission status: Inpatient       Preferred Pharmacy:   [facility] [place], [id number], [address], [place]  [address] [facility] [address]  Phone: [phone] Fax: [phone]    [facility] - [place], [place] - [address]  [address]  [address]  [facility] [address]  Phone: [phone] Fax: [phone]    [facility] [place]  [place] - [address]  [address] [facility] [place] [address]  Phone: [phone] Fax: [phone]    [facility], [place] - [address]  [address] [facility] [place] [address]  Phone: [phone] Fax: [phone]    [facility] [place] - [address]  [address]  [place] [address]  Phone: [phone] Fax: [phone]    [id number] [place] [place] - [address]  [address]  [place] [address]  Phone: [phone] Fax: [phone]    Primary Care Provider: Doe Miller DO    Primary Insurance: MEDICARE  Secondary Insurance: [facility]    ASSESSMENT:  Active Health Care Proxies       Ml Quintero, Oneil [facility] Health Care Representative - Brother   Primary Phone: [phone] (Mobile)                 Advance Directives  Does patient have a Health Care POA?: Yes  Does patient have Advance Directives?: No  Was patient offered paperwork?: No  Primary Contact: Oneil Bull Jr. (brother)         Readmission Root Cause  30 Day Readmission: No    Patient Information  Admitted from:: Facility  Mental Status: Confused  During Assessment patient was accompanied by: Not accompanied during assessment  Assessment information provided by::  (Previous assessment, brother)  Primary Caregiver: Other (Comment)  Caregiver's Name:: [facility] Staff  Caregiver's Relationship to Patient:: Facility Staff  Caregiver's Telephone Number:: [phone]  Support Systems: Family members  County of Residence: [facility]  What [facility] do you live in?: [place]  Home entry access options. Select all that apply.: No steps to enter home  Type of Current Residence: Facility  Upon entering residence, is there a bedroom on the main floor (no further steps)?: Yes  Upon entering residence, is there a bathroom on the main floor (no further steps)?: Yes  Living Arrangements: Other (Comment) ([facility])  Is patient a ?: No    Activities of Daily Living Prior to Admission  Functional Status: Total dependent  Completes ADLs independently?: No  Level of ADL dependence: Total Dependent  Ambulates independently?: No  Level of ambulatory dependence: Total Dependent  Does patient use assisted devices?: Yes  Assisted Devices (DME) used: Wheelchair  Does patient currently own DME?: Yes  What DME does the patient currently own?: Wheelchair  Does patient have a history of Outpatient Therapy (PT/OT)?: No  Does the patient have a history of Short-Term Rehab?: No  Does patient have a history of HHC?: No  Does patient currently have HHC?: No         Patient Information Continued  Income Source: SSI/SSD  Does patient have prescription coverage?: Yes  Does patient receive dialysis treatments?: No  Does patient have a history of substance abuse?: No  Does patient have a history of Mental Health Diagnosis?: Yes  Is patient receiving treatment for mental health?: Yes  Has patient received inpatient treatment related to mental health in the last 2 years?: No         Means of Transportation  Means of Transport to [facility]:: None      Social Determinants of Health (SDOH)      Flowsheet Row Most Recent Value   Housing Stability    In the last 12 months, was there a time when you were not able to pay the mortgage or rent on time? N   In the past 12 months, how many times have you moved where you were living? 0   At any time in the past 12 months, were you homeless or living in a shelter (including now)? N   Transportation Needs    In the past 12 months, has lack of transportation kept you from medical appointments or from getting medications? no   In the past 12 months, has lack of transportation kept you from meetings, work, or from getting things needed for daily living? No   Food Insecurity    Within the past 12 months, you worried that your food would run out before you got the money to buy more. Never true   Within the past  12 months, the food you bought just didn't last and you didn't have money to get more. Never true   Utilities    In the past 12 months has the electric, gas, oil, or water company threatened to shut off services in your home? No            DISCHARGE DETAILS:    Discharge planning discussed with:: Brother  Freedom of Choice: Yes  Comments - Freedom of Choice: Discussed FOC  CM contacted family/caregiver?: Yes (Spoke to brother)             Contacts  Patient Contacts: Oneil Quintero (brother)  Relationship to Patient:: Family  Contact Method: Phone  Phone Number: [phone]  Reason/Outcome: Continuity of Care, Emergency Contact, Discharge Planning              Other Referral/Resources/Interventions Provided:  Referral Comments: Patient to return to [facility], where she is a long-term resident.  Referral entered in AIDIN- message left for facility to call CM.    Would you like to participate in our [facility] service program?  : No - Declined    Patient is a long-term resident of [facility]- message left on voicemail to call CM back. CM spoke to brother, who stated he would be available to make medical decisions for patient (his father is in the hospital with a broken hip).  Referral placed in [facility] for [place].  CM will follow for needs.

## 2024-09-27 NOTE — ASSESSMENT & PLAN NOTE
Noted this hospitalization, last BP check of 78/49  Recommend recheck and manual pressures   Possibly in setting of hypovolemia? Vs. Underlying infection   Pt remains afebrile  Continue IV abx as below   Continue tube feeds per nutrition, consideration for additional IVF as well   Monitor closely

## 2024-09-27 NOTE — PLAN OF CARE
Problem: Prexisting or High Potential for Compromised Skin Integrity  Goal: Skin integrity is maintained or improved  Description: INTERVENTIONS:  - Identify patients at risk for skin breakdown  - Assess and monitor skin integrity  - Assess and monitor nutrition and hydration status  - Monitor labs   - Assess for incontinence   - Turn and reposition patient  - Assist with mobility/ambulation  - Relieve pressure over bony prominences  - Avoid friction and shearing  - Provide appropriate hygiene as needed including keeping skin clean and dry  - Evaluate need for skin moisturizer/barrier cream  - Collaborate with interdisciplinary team   - Patient/family teaching  - Consider wound care consult   Outcome: Progressing     Problem: SAFETY ADULT  Goal: Patient will remain free of falls  Description: INTERVENTIONS:  - Educate patient/family on patient safety including physical limitations  - Instruct patient to call for assistance with activity   - Consult OT/PT to assist with strengthening/mobility   - Keep Call bell within reach  - Keep bed low and locked with side rails adjusted as appropriate  - Keep care items and personal belongings within reach  - Initiate and maintain comfort rounds  - Make Fall Risk Sign visible to staff  - Offer Toileting every 2 Hours, in advance of need  - Initiate/Maintain bed alarm  - Obtain necessary fall risk management equipment:   - Apply yellow socks and bracelet for high fall risk patients  - Consider moving patient to room near nurses station  Outcome: Progressing

## 2024-09-27 NOTE — ASSESSMENT & PLAN NOTE
UA (+) for leukocytes and nitrates and urine microscopy positive for innumerable bacteria and leukocytes  Urine culture pre aponte with enterococcus and aerococcus, awaiting final sensitivities   Continue IV ceftriaxone for now pending final cultures   1/2 BC (+) for staph coag negative, likely contaminant and will follow  Hemodynamically stable and afebrile otherwise

## 2024-09-27 NOTE — PLAN OF CARE
Problem: SAFETY ADULT  Goal: Patient will remain free of falls  Description: INTERVENTIONS:  - Educate patient/family on patient safety including physical limitations  - Instruct patient to call for assistance with activity   - Consult OT/PT to assist with strengthening/mobility   - Keep Call bell within reach  - Keep bed low and locked with side rails adjusted as appropriate  - Keep care items and personal belongings within reach  - Initiate and maintain comfort rounds  - Make Fall Risk Sign visible to staff  - Offer Toileting vashti 1 Hours, in advance of need  - Initiate/Maintain bed alarm  - Obtain necessary fall risk management equipment:   - Apply yellow socks and bracelet for high fall risk patients  - Consider moving patient to room near nurses station  Outcome: Progressing

## 2024-09-27 NOTE — PROGRESS NOTES
Patient:    MRN:  [id number]    Abdelrahman Request ID:  [id number]    Level of care reserved:  Skilled Nursing Facility    Partner Reserved:  [facility] [place] [address] [phone]    Clinical needs requested:    Geography searched:  10 miles around [address]    Start of Service:    Request sent:  9:29am EDT on 9/27/2024 by Hillary Edmonds    Partner reserved:  9:56am EDT on 9/27/2024 by Hillary Edmonds    Choice list shared:  9:56am EDT on 9/27/2024 by Hillary Edmonds

## 2024-09-27 NOTE — CASE MANAGEMENT
Case Management Discharge Planning Note    Patient name Maira Bull  Location [facility] 713/[facility] 713-01 MRN [id number]  : 1980 Date 2024       Current Admission Date: 2024  Current Admission Diagnosis:Intractable Lennox-Gastaut syndrome with breakthrough seizures   Patient Active Problem List    Diagnosis Date Noted Date Diagnosed    Hypotension 2024     Acute cystitis 2024     Fracture of tooth 2024     PEG tube malfunction (HCC) 2020     Labyrinthine disorder 03/10/2020     Intellectual disability with epilepsy (HCC) 12/10/2019     Fatty infiltration of liver 2019     Moderate protein-calorie malnutrition (HCC) 2019     Dislodged gastrostomy tube 2019     S/P placement of VNS (vagus nerve stimulation) device 2019     Breast mass 01/15/2019     Sedated due to multiple medications 01/10/2019     Hypophosphatemia 2018     Right atrial enlargement 10/13/2018     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     Skin breakdown 10/09/2018     Incontinence 2018     Low blood pressure 2018     Somnolence 2018     Intractable Lennox-Gastaut syndrome with breakthrough seizures 2018     Labial cyst 2017     Positive blood culture 2017     Osteoporosis 2013     Onychomycosis 2013     Hyperkeratosis 2013     Cerebral anoxic injury (HCC) 2013       LOS (days): 2  Geometric Mean LOS (GMLOS) (days): 2.7  Days to GMLOS:0.9     OBJECTIVE:  Risk of Unplanned Readmission Score: 14         Current admission status: Inpatient   Preferred Pharmacy:   [facility] [employer] [facility] [id number], [address], [place]  [address] [facility] [address]  Phone: [phone] Fax: [phone]    [facility] - [place], [place] - [address]  [address]  [address]  [facility] [address]  Phone: [phone] Fax: [phone]    [facility] - [place] - [address]  [address] [facility] [place] [address]  Phone: [phone] Fax: [phone]    [facility] [place] - [address] [facility] [address]  Phone: [phone] Fax: [phone]    [facility] [place] - [place] - [address]  [address]  [place] [address]  Phone: [phone] Fax: [phone]    [id number] - [place] - [address] [address] [facility] [address]  [address] [facility] [address]  Phone: [phone] Fax: [phone]    Primary Care Provider: Doe Miller DO    Primary Insurance: MEDICARE  Secondary Insurance: [facility]    DISCHARGE DETAILS:    Other Referral/Resources/Interventions Provided:  Referral Comments: Messaged [facility] through [facility]- patient is not able to go back today but may be transgerred on the weekend- they will rake patient back anytime.

## 2024-09-27 NOTE — ASSESSMENT & PLAN NOTE
G tube in place  IR evaluated on 9/26 due to concerns for dislodgement  S/p G tube exchange, now functioning properly   Continue with tube feeds per nutrition recommendations and medications through G tube  BMI of 19.81

## 2024-09-27 NOTE — ASSESSMENT & PLAN NOTE
1/2 BC (+) for staph coag negative, likely contaminant   Follow additional cultures, additional BC negative x 48 hours  Continue IV abx for UTI as above   Trend closely

## 2024-09-27 NOTE — ASSESSMENT & PLAN NOTE
Pt presented to [facility] for intractable seizures   CT head unremarkable  Possible UTI, (+) UA, awaiting final urine culture results, plan for this as noted below  Most recent hospital stay at [facility] from 8/7-8/13 for VNS battery exchange and recent setting changes by neurology 9/19/2024.    Transferred to [facility] for vEEG monitoring   Neurology evaluated,  Likely breakthrough seizure in setting of underlying infection    Briviact increased to 100 mg BID, continue   Continue rest of home seizure medications of clobazam 10 mg BID, lacosamide 200 mg twice daily, rufinamide 1600 mg twice daily, topiramate 250 mg twice daily and Epidiolex 500 mg BID  Pt now back to baseline and therefore no indication for vEEG  Seizure precautions  Follow up topiramate, rufinamide and clobazam levels  Lacosamide level WNL

## 2024-09-28 PROCEDURE — 87147 CULTURE TYPE IMMUNOLOGIC: CPT | Performed by: FAMILY MEDICINE

## 2024-09-28 PROCEDURE — 87040 BLOOD CULTURE FOR BACTERIA: CPT | Performed by: INTERNAL MEDICINE

## 2024-09-28 PROCEDURE — C9254 INJECTION, LACOSAMIDE: HCPCS | Performed by: FAMILY MEDICINE

## 2024-09-28 PROCEDURE — 99232 SBSQ HOSP IP/OBS MODERATE 35: CPT | Performed by: INTERNAL MEDICINE

## 2024-09-28 PROCEDURE — 87081 CULTURE SCREEN ONLY: CPT | Performed by: FAMILY MEDICINE

## 2024-09-28 RX ADMIN — CANNABIDIOL 400 MG: 100 SOLUTION ORAL at 09:42

## 2024-09-28 RX ADMIN — CEFTRIAXONE 1000 MG: 1 INJECTION, POWDER, FOR SOLUTION INTRAMUSCULAR; INTRAVENOUS at 23:53

## 2024-09-28 RX ADMIN — CANNABIDIOL 100 MG: 100 SOLUTION ORAL at 18:12

## 2024-09-28 RX ADMIN — TOPIRAMATE 250 MG: 100 TABLET, FILM COATED ORAL at 09:41

## 2024-09-28 RX ADMIN — CLOBAZAM 10 MG: 10 TABLET ORAL at 09:42

## 2024-09-28 RX ADMIN — LACOSAMIDE 200 MG: 10 INJECTION INTRAVENOUS at 12:12

## 2024-09-28 RX ADMIN — CLOBAZAM 10 MG: 10 TABLET ORAL at 21:26

## 2024-09-28 RX ADMIN — BRIVARACETAM 100 MG: 10 SOLUTION ORAL at 18:15

## 2024-09-28 RX ADMIN — CANNABIDIOL 400 MG: 100 SOLUTION ORAL at 18:13

## 2024-09-28 RX ADMIN — CANNABIDIOL 100 MG: 100 SOLUTION ORAL at 09:42

## 2024-09-28 RX ADMIN — BRIVARACETAM 100 MG: 10 SOLUTION ORAL at 09:42

## 2024-09-28 RX ADMIN — LACOSAMIDE 200 MG: 10 INJECTION INTRAVENOUS at 23:53

## 2024-09-28 RX ADMIN — TOPIRAMATE 250 MG: 100 TABLET, FILM COATED ORAL at 18:13

## 2024-09-28 NOTE — ASSESSMENT & PLAN NOTE
1/2 BC (+) for staph coag negative, likely contaminant   Follow additional cultures, additional BC negative x 48 hours  Continue IV abx for UTI as above   Trend closely  Repeat blood cultures pending

## 2024-09-28 NOTE — PLAN OF CARE
Problem: SAFETY ADULT  Goal: Patient will remain free of falls  Description: INTERVENTIONS:  - Educate patient/family on patient safety including physical limitations  - Instruct patient to call for assistance with activity   - Consult OT/PT to assist with strengthening/mobility   - Keep Call bell within reach  - Keep bed low and locked with side rails adjusted as appropriate  - Keep care items and personal belongings within reach  - Initiate and maintain comfort rounds  - Make Fall Risk Sign visible to staff  - Offer Toileting every 2 Hours, in advance of need  - Initiate/Maintain bed alarm  - Obtain necessary fall risk management equipment: non skid socks  - Apply yellow socks and bracelet for high fall risk patients  - Consider moving patient to room near nurses station  Outcome: Progressing     Problem: NEUROSENSORY - ADULT  Goal: Remains free of injury related to seizures activity  Description: INTERVENTIONS  - Maintain airway, patient safety  and administer oxygen as ordered  - Monitor patient for seizure activity, document and report duration and description of seizure to physician/advanced practitioner  - If seizure occurs,  ensure patient safety during seizure  - Reorient patient post seizure  - Seizure pads on all 4 side rails  - Instruct patient/family to notify RN of any seizure activity including if an aura is experienced  - Instruct patient/family to call for assistance with activity based on nursing assessment  - Administer anti-seizure medications if ordered    Outcome: Progressing

## 2024-09-28 NOTE — ASSESSMENT & PLAN NOTE
Pt presented to [facility] for intractable seizures   CT head unremarkable  Possible UTI, (+) UA, awaiting final urine culture results, plan for this as noted below  Most recent hospital stay at [facility] from 8/7-8/13 for VNS battery exchange and recent setting changes by neurology 9/19/2024.    Transferred to [facility] for vEEG monitoring   Neurology evaluated,  Likely breakthrough seizure in setting of underlying infection    Briviact increased to 100 mg BID, continue   Continue rest of home seizure medications of clobazam 10 mg BID, lacosamide 200 mg twice daily, rufinamide 1600 mg twice daily, topiramate 250 mg twice daily and Epidiolex 500 mg BID  Pt now back to baseline and therefore no indication for vEEG  Seizure precautions  Follow up topiramate, rufinamide and clobazam levels  Lacosamide level WNL

## 2024-09-28 NOTE — PROGRESS NOTES
Progress Note - Hospitalist   Name: Maira Bull 44 y.o. female I MRN: [id number]  [facility] I Date of Admission: 9/25/2024   Date of Service: 9/28/2024 I Hospital Day: 3    Assessment & Plan  Intractable Lennox-Gastaut syndrome with breakthrough seizures  Pt presented to [facility] for intractable seizures   CT head unremarkable  Possible UTI, (+) UA, awaiting final urine culture results, plan for this as noted below  Most recent hospital stay at [facility] from 8/7-8/13 for VNS battery exchange and recent setting changes by neurology 9/19/2024.    Transferred to [facility] for vEEG monitoring   Neurology evaluated,  Likely breakthrough seizure in setting of underlying infection    Briviact increased to 100 mg BID, continue   Continue rest of home seizure medications of clobazam 10 mg BID, lacosamide 200 mg twice daily, rufinamide 1600 mg twice daily, topiramate 250 mg twice daily and Epidiolex 500 mg BID  Pt now back to baseline and therefore no indication for vEEG  Seizure precautions  Follow up topiramate, rufinamide and clobazam levels  Lacosamide level WNL  Hypotension  Noted this hospitalization, last BP check of 78/49  Recommend recheck and manual pressures   Possibly in setting of hypovolemia? Vs. Underlying infection   Pt remains afebrile  Continue IV abx as below   Continue tube feeds per nutrition, consideration for additional IVF as well   Monitor closely  Cerebral anoxic injury (HCC)  History of cerebral anoxic injury, nonverbal at baseline, patient with significant debility and is a nursing home resident.   Continue with supportive care    Moderate protein-calorie malnutrition (HCC)  G tube in place  IR evaluated on 9/26 due to concerns for dislodgement  S/p G tube exchange, now functioning properly   Continue with tube feeds per nutrition recommendations and medications through G tube  BMI of 19.81  Acute cystitis  UA (+) for leukocytes and nitrates and urine microscopy positive for  innumerable bacteria and leukocytes  Urine culture pre aponte with enterococcus and aerococcus, awaiting final sensitivities   Continue IV ceftriaxone for now pending final cultures   1/2 BC (+) for staph coag negative, likely contaminant and will follow  Hemodynamically stable and afebrile otherwise     Positive blood culture  1/2 BC (+) for staph coag negative, likely contaminant   Follow additional cultures, additional BC negative x 48 hours  Continue IV abx for UTI as above   Trend closely  Repeat blood cultures pending    VTE Pharmacologic Prophylaxis:   Low Risk (Score 0-2) - Encourage Ambulation.    Mobility:   Basic Mobility Inpatient Raw Score: 6  JH-HLM Goal: 2: Bed activities/Dependent transfer  JH-HLM Achieved: 1: Laying in bed  JH-HLM Goal achieved. Continue to encourage appropriate mobility.    Patient Centered Rounds: I performed bedside rounds with nursing staff today.   Discussions with Specialists or Other Care Team Provider:     Education and Discussions with Family / Patient: Attempted to update  (brother) via phone. Unable to contact.    Current Length of Stay: 3 day(s)  Current Patient Status: Inpatient   Certification Statement: The patient will continue to require additional inpatient hospital stay due to monitoring  Discharge Plan: Anticipate discharge in 48 hrs to discharge location to be determined pending rehab evaluations.    Code Status: Level 1 - Full Code    Subjective   Patient nonverbal, appears comfortable    Objective     Vitals:   Temp (24hrs), Av.4 °F (36.9 °C), Min:97.8 °F (36.6 °C), Max:99.3 °F (37.4 °C)    Temp:  [97.8 °F (36.6 °C)-99.3 °F (37.4 °C)] 98.1 °F (36.7 °C)  HR:  [62-74] 74  Resp:  [25] 25  BP: ()/(59-72) 94/61  SpO2:  [98 %-100 %] 98 %  Body mass index is 19.81 kg/m².     Input and Output Summary (last 24 hours):     Intake/Output Summary (Last 24 hours) at 2024 4073  Last data filed at 2024 0800  Gross per 24 hour   Intake 0 ml    Output --   Net 0 ml       Physical Exam  Constitutional:       General: She is not in acute distress.     Appearance: She is not toxic-appearing or diaphoretic.   Eyes:      General: No scleral icterus.  Cardiovascular:      Rate and Rhythm: Normal rate and regular rhythm.      Heart sounds: No murmur heard.     No friction rub. No gallop.   Pulmonary:      Effort: No respiratory distress.      Breath sounds: No stridor. No wheezing, rhonchi or rales.   Chest:      Chest wall: No tenderness.   Abdominal:      General: There is no distension.      Palpations: There is no mass.      Tenderness: There is no abdominal tenderness. There is no guarding or rebound.      Hernia: No hernia is present.   Musculoskeletal:         General: No swelling or tenderness.   Neurological:      Comments: Patient resting comfortably          Lines/Drains:  Lines/Drains/Airways       Active Status       Name Placement date Placement time Site Days    Gastrostomy/Enterostomy Percutaneous Interventional Gastrostomy 16 Fr. LUQ 09/26/24  1237  LUQ  2                            Lab Results: I have reviewed the following results: CBC/BMP: No new results in last 24 hours.    Results from last 7 days   Lab Units 09/27/24  0618 09/26/24  0633 09/25/24  1436   WBC Thousand/uL 6.36   < > 8.60   HEMOGLOBIN g/dL 10.7*   < > 10.8*   HEMATOCRIT % 34.2*   < > 32.6*   PLATELETS Thousands/uL 171   < > 158   SEGS PCT %  --   --  57   LYMPHO PCT %  --   --  30   MONO PCT %  --   --  8   EOS PCT %  --   --  4    < > = values in this interval not displayed.     Results from last 7 days   Lab Units 09/27/24  0618 09/24/24  2325 09/23/24  1843   SODIUM mmol/L 140   < > 139   POTASSIUM mmol/L 4.2   < > 3.6   CHLORIDE mmol/L 114*   < > 108   CO2 mmol/L 21   < > 21   BUN mg/dL 8   < > 17   CREATININE mg/dL 0.34*   < > 0.49*   ANION GAP mmol/L 5   < > 10   CALCIUM mg/dL 8.5   < > 10.2   ALBUMIN g/dL  --   --  4.7   TOTAL BILIRUBIN mg/dL  --   --  0.44   ALK PHOS  U/L  --   --  96   ALT U/L  --   --  28   AST U/L  --   --  23   GLUCOSE RANDOM mg/dL 105   < > 122    < > = values in this interval not displayed.                 Results from last 7 days   Lab Units 09/25/24  0030 09/24/24  2325   LACTIC ACID mmol/L 1.8  --    PROCALCITONIN ng/ml  --  <0.05       Recent Cultures (last 7 days):   Results from last 7 days   Lab Units 09/25/24  0030 09/24/24  2220   BLOOD CULTURE  Staphylococcus coagulase negative*  No Growth at 72 hrs.  --    GRAM STAIN RESULT  Gram positive cocci in clusters*  --    URINE CULTURE   --  80,000-89,000 cfu/ml Escherichia coli*  80,000-89,000 cfu/ml Aerococcus urinae*  20,000-29,000 cfu/ml Enterococcus species*       Imaging Review: No pertinent imaging studies reviewed.  Other Studies: No additional pertinent studies reviewed.    Last 24 Hours Medication List:     Current Facility-Administered Medications:     Brivaracetam (BRIVIACT) oral solution 100 mg, BID    cannabidiol (EPIDIOLEX) oral solution 100 mg, BID **AND** cannabidiol (EPIDIOLEX) oral solution 400 mg, BID    cefTRIAXone (ROCEPHIN) 1,000 mg in dextrose 5 % 50 mL IVPB, Q24H, Last Rate: 1,000 mg (09/27/24 2304)    cloBAZam (ONFI) tablet 10 mg, Q12H NANDO    lacosamide (VIMPAT) injection 200 mg, Q12H    Rufinamide SUSP 1,600 mg, Q12H NANDO    topiramate (TOPAMAX) tablet 250 mg, BID    Administrative Statements   Today, Patient Was Seen By: Rogelio Garrett DO      **Please Note: This note may have been constructed using a voice recognition system.**

## 2024-09-28 NOTE — PLAN OF CARE
Problem: Prexisting or High Potential for Compromised Skin Integrity  Goal: Skin integrity is maintained or improved  Description: INTERVENTIONS:  - Identify patients at risk for skin breakdown  - Assess and monitor skin integrity  - Assess and monitor nutrition and hydration status  - Monitor labs   - Assess for incontinence   - Turn and reposition patient  - Assist with mobility/ambulation  - Relieve pressure over bony prominences  - Avoid friction and shearing  - Provide appropriate hygiene as needed including keeping skin clean and dry  - Evaluate need for skin moisturizer/barrier cream  - Collaborate with interdisciplinary team   - Patient/family teaching  - Consider wound care consult   Outcome: Progressing     Problem: INFECTION - ADULT  Goal: Absence or prevention of progression during hospitalization  Description: INTERVENTIONS:  - Assess and monitor for signs and symptoms of infection  - Monitor lab/diagnostic results  - Monitor all insertion sites, i.e. indwelling lines, tubes, and drains  - Monitor endotracheal if appropriate and nasal secretions for changes in amount and color  - [place] appropriate cooling/warming therapies per order  - Administer medications as ordered  - Instruct and encourage patient and family to use good hand hygiene technique  - Identify and instruct in appropriate isolation precautions for identified infection/condition  Outcome: Progressing  Goal: Absence of fever/infection during neutropenic period  Description: INTERVENTIONS:  - Monitor WBC    Outcome: Progressing     Problem: SAFETY ADULT  Goal: Patient will remain free of falls  Description: INTERVENTIONS:  - Educate patient/family on patient safety including physical limitations  - Instruct patient to call for assistance with activity   - Consult OT/PT to assist with strengthening/mobility   - Keep Call bell within reach  - Keep bed low and locked with side rails adjusted as appropriate  - Keep care items and personal  belongings within reach  - Initiate and maintain comfort rounds  - Make Fall Risk Sign visible to staff  - Offer Toileting every  Hours, in advance of need  - Initiate/Maintain alarm  - Obtain necessary fall risk management equipment:   - Apply yellow socks and bracelet for high fall risk patients  - Consider moving patient to room near nurses station  Outcome: Progressing     Problem: DISCHARGE PLANNING  Goal: Discharge to home or other facility with appropriate resources  Description: INTERVENTIONS:  - Identify barriers to discharge w/patient and caregiver  - Arrange for needed discharge resources and transportation as appropriate  - Identify discharge learning needs (meds, wound care, etc.)  - Arrange for interpretive services to assist at discharge as needed  - Refer to Case Management Department for coordinating discharge planning if the patient needs post-hospital services based on physician/advanced practitioner order or complex needs related to functional status, cognitive ability, or social support system  Outcome: Progressing     Problem: Knowledge Deficit  Goal: Patient/family/caregiver demonstrates understanding of disease process, treatment plan, medications, and discharge instructions  Description: Complete learning assessment and assess knowledge base.  Interventions:  - Provide teaching at level of understanding  - Provide teaching via preferred learning methods  Outcome: Progressing     Problem: NEUROSENSORY - ADULT  Goal: Remains free of injury related to seizures activity  Description: INTERVENTIONS  - Maintain airway, patient safety  and administer oxygen as ordered  - Monitor patient for seizure activity, document and report duration and description of seizure to physician/advanced practitioner  - If seizure occurs,  ensure patient safety during seizure  - Reorient patient post seizure  - Seizure pads on all 4 side rails  - Instruct patient/family to notify RN of any seizure activity including  if an aura is experienced  - Instruct patient/family to call for assistance with activity based on nursing assessment  - Administer anti-seizure medications if ordered    Outcome: Progressing     Problem: SKIN/TISSUE INTEGRITY - ADULT  Goal: Skin Integrity remains intact(Skin Breakdown Prevention)  Description: Assess:  -Perform Tyson assessment every   -Clean and moisturize skin every   -Inspect skin when repositioning, toileting, and assisting with ADLS  -Assess under medical devices such as  every   -Assess extremities for adequate circulation and sensation     Bed Management:  -Have minimal linens on bed & keep smooth, unwrinkled  -Change linens as needed when moist or perspiring  -Avoid sitting or lying in one position for more than  hours while in bed  -Keep HOB at degrees     Toileting:  -Offer bedside commode  -Assess for incontinence every   -Use incontinent care products after each incontinent episode such as     Activity:  -Mobilize patient  times a day  -Encourage activity and walks on unit  -Encourage or provide ROM exercises   -Turn and reposition patient every  Hours  -Use appropriate equipment to lift or move patient in bed  -Instruct/ Assist with weight shifting every  when out of bed in chair  -Consider limitation of chair time  hour intervals    Skin Care:  -Avoid use of baby powder, tape, friction and shearing, hot water or constrictive clothing  -Relieve pressure over bony prominences using   -Do not massage red bony areas    Next Steps:  -Teach patient strategies to minimize risks such as    -Consider consults to  interdisciplinary teams such as   Outcome: Progressing     Problem: Nutrition/Hydration-ADULT  Goal: Nutrient/Hydration intake appropriate for improving, restoring or maintaining nutritional needs  Description: Monitor and assess patient's nutrition/hydration status for malnutrition. Collaborate with interdisciplinary team and initiate plan and interventions as ordered.  Monitor  patient's weight and dietary intake as ordered or per policy. Utilize nutrition screening tool and intervene as necessary. Determine patient's food preferences and provide high-protein, high-caloric foods as appropriate.     INTERVENTIONS:  - Monitor oral intake, urinary output, labs, and treatment plans  - Assess nutrition and hydration status and recommend course of action  - Evaluate amount of meals eaten  - Assist patient with eating if necessary   - Allow adequate time for meals  - Recommend/ encourage appropriate diets, oral nutritional supplements, and vitamin/mineral supplements  - Order, calculate, and assess calorie counts as needed  - Recommend, monitor, and adjust tube feedings and TPN/PPN based on assessed needs  - Assess need for intravenous fluids  - Provide specific nutrition/hydration education as appropriate  - Include patient/family/caregiver in decisions related to nutrition  Outcome: Progressing

## 2024-09-29 LAB
ALBUMIN SERPL BCG-MCNC: 3.7 G/DL (ref 3.5–5)
ALP SERPL-CCNC: 83 U/L (ref 34–104)
ALT SERPL W P-5'-P-CCNC: 34 U/L (ref 7–52)
ANION GAP SERPL CALCULATED.3IONS-SCNC: 8 MMOL/L (ref 4–13)
AST SERPL W P-5'-P-CCNC: 26 U/L (ref 13–39)
BACTERIA BLD CULT: ABNORMAL
BACTERIA BLD CULT: NORMAL
BASOPHILS # BLD AUTO: 0.07 THOUSANDS/ÂΜL (ref 0–0.1)
BASOPHILS NFR BLD AUTO: 1 % (ref 0–1)
BILIRUB SERPL-MCNC: 0.19 MG/DL (ref 0.2–1)
BUN SERPL-MCNC: 13 MG/DL (ref 5–25)
CALCIUM SERPL-MCNC: 9.1 MG/DL (ref 8.4–10.2)
CHLORIDE SERPL-SCNC: 108 MMOL/L (ref 96–108)
CO2 SERPL-SCNC: 21 MMOL/L (ref 21–32)
CREAT SERPL-MCNC: 0.37 MG/DL (ref 0.6–1.3)
EOSINOPHIL # BLD AUTO: 0.43 THOUSAND/ÂΜL (ref 0–0.61)
EOSINOPHIL NFR BLD AUTO: 6 % (ref 0–6)
ERYTHROCYTE [DISTWIDTH] IN BLOOD BY AUTOMATED COUNT: 12.7 % (ref 11.6–15.1)
GFR SERPL CREATININE-BSD FRML MDRD: 131 ML/MIN/1.73SQ M
GLUCOSE SERPL-MCNC: 147 MG/DL (ref 65–140)
GRAM STN SPEC: ABNORMAL
HCT VFR BLD AUTO: 33.4 % (ref 34.8–46.1)
HGB BLD-MCNC: 11 G/DL (ref 11.5–15.4)
IMM GRANULOCYTES # BLD AUTO: 0.02 THOUSAND/UL (ref 0–0.2)
IMM GRANULOCYTES NFR BLD AUTO: 0 % (ref 0–2)
LYMPHOCYTES # BLD AUTO: 2.28 THOUSANDS/ÂΜL (ref 0.6–4.47)
LYMPHOCYTES NFR BLD AUTO: 32 % (ref 14–44)
MCH RBC QN AUTO: 32.6 PG (ref 26.8–34.3)
MCHC RBC AUTO-ENTMCNC: 32.9 G/DL (ref 31.4–37.4)
MCV RBC AUTO: 99 FL (ref 82–98)
MONOCYTES # BLD AUTO: 0.66 THOUSAND/ÂΜL (ref 0.17–1.22)
MONOCYTES NFR BLD AUTO: 9 % (ref 4–12)
NEUTROPHILS # BLD AUTO: 3.75 THOUSANDS/ÂΜL (ref 1.85–7.62)
NEUTS SEG NFR BLD AUTO: 52 % (ref 43–75)
NRBC BLD AUTO-RTO: 0 /100 WBCS
PLATELET # BLD AUTO: 189 THOUSANDS/UL (ref 149–390)
PMV BLD AUTO: 11.4 FL (ref 8.9–12.7)
POTASSIUM SERPL-SCNC: 3.6 MMOL/L (ref 3.5–5.3)
PROT SERPL-MCNC: 6.6 G/DL (ref 6.4–8.4)
RBC # BLD AUTO: 3.37 MILLION/UL (ref 3.81–5.12)
S AUREUS+CONS DNA BLD POS NAA+NON-PROBE: DETECTED
SODIUM SERPL-SCNC: 137 MMOL/L (ref 135–147)
WBC # BLD AUTO: 7.21 THOUSAND/UL (ref 4.31–10.16)

## 2024-09-29 PROCEDURE — 99232 SBSQ HOSP IP/OBS MODERATE 35: CPT | Performed by: INTERNAL MEDICINE

## 2024-09-29 PROCEDURE — C9254 INJECTION, LACOSAMIDE: HCPCS | Performed by: FAMILY MEDICINE

## 2024-09-29 PROCEDURE — 85025 COMPLETE CBC W/AUTO DIFF WBC: CPT | Performed by: INTERNAL MEDICINE

## 2024-09-29 PROCEDURE — 80053 COMPREHEN METABOLIC PANEL: CPT | Performed by: INTERNAL MEDICINE

## 2024-09-29 RX ORDER — LORAZEPAM 2 MG/ML
0.5 INJECTION INTRAMUSCULAR ONCE
Status: COMPLETED | OUTPATIENT
Start: 2024-09-30 | End: 2024-09-30

## 2024-09-29 RX ORDER — ENOXAPARIN SODIUM 100 MG/ML
40 INJECTION SUBCUTANEOUS
Status: DISCONTINUED | OUTPATIENT
Start: 2024-09-29 | End: 2024-10-01 | Stop reason: HOSPADM

## 2024-09-29 RX ADMIN — CANNABIDIOL 100 MG: 100 SOLUTION ORAL at 17:53

## 2024-09-29 RX ADMIN — ENOXAPARIN SODIUM 40 MG: 40 INJECTION SUBCUTANEOUS at 15:42

## 2024-09-29 RX ADMIN — TOPIRAMATE 250 MG: 100 TABLET, FILM COATED ORAL at 08:23

## 2024-09-29 RX ADMIN — CANNABIDIOL 400 MG: 100 SOLUTION ORAL at 17:54

## 2024-09-29 RX ADMIN — LACOSAMIDE 200 MG: 10 INJECTION INTRAVENOUS at 12:02

## 2024-09-29 RX ADMIN — BRIVARACETAM 100 MG: 10 SOLUTION ORAL at 17:38

## 2024-09-29 RX ADMIN — CLOBAZAM 10 MG: 10 TABLET ORAL at 08:23

## 2024-09-29 RX ADMIN — LACOSAMIDE 200 MG: 10 INJECTION INTRAVENOUS at 22:23

## 2024-09-29 RX ADMIN — CANNABIDIOL 400 MG: 100 SOLUTION ORAL at 10:13

## 2024-09-29 RX ADMIN — BRIVARACETAM 100 MG: 10 SOLUTION ORAL at 12:01

## 2024-09-29 RX ADMIN — CLOBAZAM 10 MG: 10 TABLET ORAL at 21:17

## 2024-09-29 RX ADMIN — TOPIRAMATE 250 MG: 100 TABLET, FILM COATED ORAL at 17:38

## 2024-09-29 RX ADMIN — CANNABIDIOL 100 MG: 100 SOLUTION ORAL at 12:02

## 2024-09-29 RX ADMIN — CEFTRIAXONE 1000 MG: 1 INJECTION, POWDER, FOR SOLUTION INTRAMUSCULAR; INTRAVENOUS at 22:23

## 2024-09-29 RX ADMIN — SODIUM CHLORIDE, SODIUM LACTATE, POTASSIUM CHLORIDE, AND CALCIUM CHLORIDE 1000 ML: .6; .31; .03; .02 INJECTION, SOLUTION INTRAVENOUS at 15:42

## 2024-09-29 NOTE — ASSESSMENT & PLAN NOTE
Noted this hospitalization, last BP check of 78/49  Recommend recheck and manual pressures   Possibly in setting of hypovolemia? Vs. Underlying infection   Pt remains afebrile  Continue IV abx as below   Will give one time bolus LR one liter for hypotension

## 2024-09-29 NOTE — PROGRESS NOTES
Progress Note - Hospitalist   Name: Maira Bull 44 y.o. female I MRN: [id number]  [facility] Date of Admission: 9/25/2024   Date of Service: 9/29/2024 I Hospital Day: 4    Assessment & Plan  Intractable Lennox-Gastaut syndrome with breakthrough seizures  Pt presented to [facility] for intractable seizures   CT head unremarkable  Possible UTI, (+) UA, awaiting final urine culture results, plan for this as noted below  Most recent hospital stay at [facility] from 8/7-8/13 for VNS battery exchange and recent setting changes by neurology 9/19/2024.    Transferred to [facility] for vEEG monitoring   Neurology evaluated,  Likely breakthrough seizure in setting of underlying infection    Briviact increased to 100 mg BID, continue   Continue rest of home seizure medications of clobazam 10 mg BID, lacosamide 200 mg twice daily, rufinamide 1600 mg twice daily, topiramate 250 mg twice daily and Epidiolex 500 mg BID  Pt now back to baseline and therefore no indication for vEEG  Seizure precautions  Follow up topiramate, rufinamide and clobazam levels  Lacosamide level WNL  Hypotension  Noted this hospitalization, last BP check of 78/49  Recommend recheck and manual pressures   Possibly in setting of hypovolemia? Vs. Underlying infection   Pt remains afebrile  Continue IV abx as below   Will give one time bolus LR one liter for hypotension  Cerebral anoxic injury (HCC)  History of cerebral anoxic injury, nonverbal at baseline, patient with significant debility and is a nursing home resident.   Continue with supportive care    Moderate protein-calorie malnutrition (HCC)  G tube in place  IR evaluated on 9/26 due to concerns for dislodgement  S/p G tube exchange, now functioning properly   Continue with tube feeds per nutrition recommendations and medications through G tube  BMI of 19.81  Acute cystitis  UA (+) for leukocytes and nitrates and urine microscopy positive for innumerable bacteria and leukocytes  Urine  culture pre aponte with enterococcus and aerococcus, awaiting final sensitivities   Continue IV ceftriaxone for now pending final cultures   1/2 BC (+) for staph coag negative, likely contaminant and will follow  Hemodynamically stable and afebrile otherwise     Positive blood culture  1/2 BC (+) for staph coag negative, likely contaminant   Follow additional cultures, additional BC negative x 48 hours  Continue IV abx for UTI as above   Trend closely  Repeat blood cultures pending    VTE Pharmacologic Prophylaxis:   High Risk (Score >/= 5) - Pharmacological DVT Prophylaxis Ordered: enoxaparin (Lovenox). Sequential Compression Devices Ordered.    Mobility:   Basic Mobility Inpatient Raw Score: 6  JH-HLM Goal: 2: Bed activities/Dependent transfer  JH-HLM Achieved: 2: Bed activities/Dependent transfer  JH-HLM Goal achieved. Continue to encourage appropriate mobility.    Patient Centered Rounds: I performed bedside rounds with nursing staff today.   Discussions with Specialists or Other Care Team Provider:     Education and Discussions with Family / Patient: Updated  (brother) via phone.    Current Length of Stay: 4 day(s)  Current Patient Status: Inpatient   Certification Statement: The patient will continue to require additional inpatient hospital stay due to hypotension, iv abx  Discharge Plan: Anticipate discharge in 24-48 hrs to discharge location to be determined pending rehab evaluations.    Code Status: Level 1 - Full Code    Subjective   Patient nonverbal, appears comfortable    Objective     Vitals:   Temp (24hrs), Av.8 °F (36.6 °C), Min:97 °F (36.1 °C), Max:98.8 °F (37.1 °C)    Temp:  [97 °F (36.1 °C)-98.8 °F (37.1 °C)] 97.8 °F (36.6 °C)  HR:  [60-82] 66  Resp:  [12-14] 12  BP: (73-97)/(41-78) 73/42  SpO2:  [97 %-100 %] 97 %  Body mass index is 19.81 kg/m².     Input and Output Summary (last 24 hours):     Intake/Output Summary (Last 24 hours) at 2024 1502  Last data filed at 2024  1424  Gross per 24 hour   Intake 476 ml   Output --   Net 476 ml       Physical Exam  Constitutional:       General: She is not in acute distress.     Appearance: She is not toxic-appearing or diaphoretic.   Eyes:      General: No scleral icterus.  Cardiovascular:      Rate and Rhythm: Normal rate and regular rhythm.      Heart sounds: No murmur heard.     No friction rub. No gallop.   Pulmonary:      Effort: No respiratory distress.      Breath sounds: No stridor. No wheezing, rhonchi or rales.   Chest:      Chest wall: No tenderness.   Abdominal:      General: There is no distension.      Palpations: There is no mass.      Tenderness: There is no abdominal tenderness. There is no guarding or rebound.      Hernia: No hernia is present.   Musculoskeletal:         General: No swelling or tenderness.   Neurological:      Comments: Patient resting comfortably          Lines/Drains:  Lines/Drains/Airways       Active Status       Name Placement date Placement time Site Days    Gastrostomy/Enterostomy Percutaneous Interventional Gastrostomy 16 Fr. LUQ 09/26/24  1237  LUQ  3                            Lab Results: I have reviewed the following results: CBC/BMP:   .     09/29/24  0345   WBC 7.21   HGB 11.0*   HCT 33.4*      SODIUM 137   K 3.6      CO2 21   BUN 13   CREATININE 0.37*   GLUC 147*       Results from last 7 days   Lab Units 09/29/24  0345   WBC Thousand/uL 7.21   HEMOGLOBIN g/dL 11.0*   HEMATOCRIT % 33.4*   PLATELETS Thousands/uL 189   SEGS PCT % 52   LYMPHO PCT % 32   MONO PCT % 9   EOS PCT % 6     Results from last 7 days   Lab Units 09/29/24  0345   SODIUM mmol/L 137   POTASSIUM mmol/L 3.6   CHLORIDE mmol/L 108   CO2 mmol/L 21   BUN mg/dL 13   CREATININE mg/dL 0.37*   ANION GAP mmol/L 8   CALCIUM mg/dL 9.1   ALBUMIN g/dL 3.7   TOTAL BILIRUBIN mg/dL 0.19*   ALK PHOS U/L 83   ALT U/L 34   AST U/L 26   GLUCOSE RANDOM mg/dL 147*                 Results from last 7 days   Lab Units 09/25/24  0030  09/24/24  2325   LACTIC ACID mmol/L 1.8  --    PROCALCITONIN ng/ml  --  <0.05       Recent Cultures (last 7 days):   Results from last 7 days   Lab Units 09/28/24  1523 09/25/24  0030 09/24/24  2220   BLOOD CULTURE  Received in Microbiology Lab. Culture in Progress.  Received in Microbiology Lab. Culture in Progress. Staphylococcus coagulase negative*  No Growth After 4 Days.  --    GRAM STAIN RESULT   --  Gram positive cocci in clusters*  --    URINE CULTURE   --   --  80,000-89,000 cfu/ml Escherichia coli*  80,000-89,000 cfu/ml Aerococcus urinae*  20,000-29,000 cfu/ml Enterococcus species*       Imaging Review: No pertinent imaging studies reviewed.  Other Studies: No additional pertinent studies reviewed.    Last 24 Hours Medication List:     Current Facility-Administered Medications:     Brivaracetam (BRIVIACT) oral solution 100 mg, BID    cannabidiol (EPIDIOLEX) oral solution 100 mg, BID **AND** cannabidiol (EPIDIOLEX) oral solution 400 mg, BID    cefTRIAXone (ROCEPHIN) 1,000 mg in dextrose 5 % 50 mL IVPB, Q24H, Last Rate: 1,000 mg (09/28/24 2353)    cloBAZam (ONFI) tablet 10 mg, Q12H NANDO    lacosamide (VIMPAT) injection 200 mg, Q12H    lactated ringers bolus 1,000 mL, Once    Rufinamide SUSP 1,600 mg, Q12H NANDO    topiramate (TOPAMAX) tablet 250 mg, BID    Administrative Statements   Today, Patient Was Seen By: Rogelio Garrett DO      **Please Note: This note may have been constructed using a voice recognition system.**

## 2024-09-29 NOTE — ASSESSMENT & PLAN NOTE
Pt presented to [facility] for intractable seizures   CT head unremarkable  Possible UTI, (+) UA, awaiting final urine culture results, plan for this as noted below  Most recent hospital stay at [facility] from 8/7-8/13 for VNS battery exchange and recent setting changes by neurology 9/19/2024.    Transferred to [facility] for vEEG monitoring   Neurology evaluated,  Likely breakthrough seizure in setting of underlying infection    Briviact increased to 100 mg BID, continue   Continue rest of home seizure medications of clobazam 10 mg BID, lacosamide 200 mg twice daily, rufinamide 1600 mg twice daily, topiramate 250 mg twice daily and Epidiolex 500 mg BID  Pt now back to baseline and therefore no indication for vEEG  Seizure precautions  Follow up topiramate, rufinamide and clobazam levels  Lacosamide level WNL

## 2024-09-29 NOTE — PLAN OF CARE
Problem: Prexisting or High Potential for Compromised Skin Integrity  Goal: Skin integrity is maintained or improved  Description: INTERVENTIONS:  - Identify patients at risk for skin breakdown  - Assess and monitor skin integrity  - Assess and monitor nutrition and hydration status  - Monitor labs   - Assess for incontinence   - Turn and reposition patient  - Assist with mobility/ambulation  - Relieve pressure over bony prominences  - Avoid friction and shearing  - Provide appropriate hygiene as needed including keeping skin clean and dry  - Evaluate need for skin moisturizer/barrier cream  - Collaborate with interdisciplinary team   - Patient/family teaching  - Consider wound care consult   Outcome: Progressing     Problem: INFECTION - ADULT  Goal: Absence or prevention of progression during hospitalization  Description: INTERVENTIONS:  - Assess and monitor for signs and symptoms of infection  - Monitor lab/diagnostic results  - Monitor all insertion sites, i.e. indwelling lines, tubes, and drains  - Monitor endotracheal if appropriate and nasal secretions for changes in amount and color  - [place] appropriate cooling/warming therapies per order  - Administer medications as ordered  - Instruct and encourage patient and family to use good hand hygiene technique  - Identify and instruct in appropriate isolation precautions for identified infection/condition  Outcome: Progressing  Goal: Absence of fever/infection during neutropenic period  Description: INTERVENTIONS:  - Monitor WBC    Outcome: Progressing     Problem: SAFETY ADULT  Goal: Patient will remain free of falls  Description: INTERVENTIONS:  - Educate patient/family on patient safety including physical limitations  - Instruct patient to call for assistance with activity   - Consult OT/PT to assist with strengthening/mobility   - Keep Call bell within reach  - Keep bed low and locked with side rails adjusted as appropriate  - Keep care items and personal  belongings within reach  - Initiate and maintain comfort rounds  - Make Fall Risk Sign visible to staff  Problem: DISCHARGE PLANNING  Goal: Discharge to home or other facility with appropriate resources  Description: INTERVENTIONS:  - Identify barriers to discharge w/patient and caregiver  - Arrange for needed discharge resources and transportation as appropriate  - Identify discharge learning needs (meds, wound care, etc.)  - Arrange for interpretive services to assist at discharge as needed  - Refer to Case Management Department for coordinating discharge planning if the patient needs post-hospital services based on physician/advanced practitioner order or complex needs related to functional status, cognitive ability, or social support system  Outcome: Progressing     Problem: Knowledge Deficit  Goal: Patient/family/caregiver demonstrates understanding of disease process, treatment plan, medications, and discharge instructions  Description: Complete learning assessment and assess knowledge base.  Interventions:  - Provide teaching at level of understanding  - Provide teaching via preferred learning methods  Outcome: Progressing     Problem: NEUROSENSORY - ADULT  Goal: Remains free of injury related to seizures activity  Description: INTERVENTIONS  - Maintain airway, patient safety  and administer oxygen as ordered  - Monitor patient for seizure activity, document and report duration and description of seizure to physician/advanced practitioner  - If seizure occurs,  ensure patient safety during seizure  - Reorient patient post seizure  - Seizure pads on all 4 side rails  - Instruct patient/family to notify RN of any seizure activity including if an aura is experienced  - Instruct patient/family to call for assistance with activity based on nursing assessment  - Administer anti-seizure medications if ordered    Outcome: Progressing     Problem: Nutrition/Hydration-ADULT  Goal: Nutrient/Hydration intake appropriate  for improving, restoring or maintaining nutritional needs  Description: Monitor and assess patient's nutrition/hydration status for malnutrition. Collaborate with interdisciplinary team and initiate plan and interventions as ordered.  Monitor patient's weight and dietary intake as ordered or per policy. Utilize nutrition screening tool and intervene as necessary. Determine patient's food preferences and provide high-protein, high-caloric foods as appropriate.     INTERVENTIONS:  - Monitor oral intake, urinary output, labs, and treatment plans  - Assess nutrition and hydration status and recommend course of action  - Evaluate amount of meals eaten  - Assist patient with eating if necessary   - Allow adequate time for meals  - Recommend/ encourage appropriate diets, oral nutritional supplements, and vitamin/mineral supplements  - Order, calculate, and assess calorie counts as needed  - Recommend, monitor, and adjust tube feedings and TPN/PPN based on assessed needs  - Assess need for intravenous fluids  - Provide specific nutrition/hydration education as appropriate  - Include patient/family/caregiver in decisions related to nutrition  Outcome: Progressing     - Apply yellow socks and bracelet for high fall risk patients  - Consider moving patient to room near nurses station  Outcome: Progressing     Problem: DISCHARGE PLANNING  Goal: Discharge to home or other facility with appropriate resources  Description: INTERVENTIONS:  - Identify barriers to discharge w/patient and caregiver  - Arrange for needed discharge resources and transportation as appropriate  - Identify discharge learning needs (meds, wound care, etc.)  - Arrange for interpretive services to assist at discharge as needed  - Refer to Case Management Department for coordinating discharge planning if the patient needs post-hospital services based on physician/advanced practitioner order or complex needs related to functional status, cognitive ability, or  social support system  Outcome: Progressing

## 2024-09-30 LAB
ANION GAP SERPL CALCULATED.3IONS-SCNC: 8 MMOL/L (ref 4–13)
BACTERIA BLD CULT: NORMAL
BASOPHILS # BLD AUTO: 0.06 THOUSANDS/ÂΜL (ref 0–0.1)
BASOPHILS NFR BLD AUTO: 1 % (ref 0–1)
BUN SERPL-MCNC: 12 MG/DL (ref 5–25)
CALCIUM SERPL-MCNC: 8.6 MG/DL (ref 8.4–10.2)
CHLORIDE SERPL-SCNC: 108 MMOL/L (ref 96–108)
CO2 SERPL-SCNC: 21 MMOL/L (ref 21–32)
CREAT SERPL-MCNC: 0.32 MG/DL (ref 0.6–1.3)
EOSINOPHIL # BLD AUTO: 0.35 THOUSAND/ÂΜL (ref 0–0.61)
EOSINOPHIL NFR BLD AUTO: 5 % (ref 0–6)
ERYTHROCYTE [DISTWIDTH] IN BLOOD BY AUTOMATED COUNT: 12.5 % (ref 11.6–15.1)
GFR SERPL CREATININE-BSD FRML MDRD: 137 ML/MIN/1.73SQ M
GLUCOSE SERPL-MCNC: 125 MG/DL (ref 65–140)
HCT VFR BLD AUTO: 34.3 % (ref 34.8–46.1)
HGB BLD-MCNC: 11 G/DL (ref 11.5–15.4)
IMM GRANULOCYTES # BLD AUTO: 0.02 THOUSAND/UL (ref 0–0.2)
IMM GRANULOCYTES NFR BLD AUTO: 0 % (ref 0–2)
LYMPHOCYTES # BLD AUTO: 2.36 THOUSANDS/ÂΜL (ref 0.6–4.47)
LYMPHOCYTES NFR BLD AUTO: 36 % (ref 14–44)
MCH RBC QN AUTO: 32.2 PG (ref 26.8–34.3)
MCHC RBC AUTO-ENTMCNC: 32.1 G/DL (ref 31.4–37.4)
MCV RBC AUTO: 100 FL (ref 82–98)
MONOCYTES # BLD AUTO: 0.66 THOUSAND/ÂΜL (ref 0.17–1.22)
MONOCYTES NFR BLD AUTO: 10 % (ref 4–12)
MRSA NOSE QL CULT: ABNORMAL
MRSA NOSE QL CULT: ABNORMAL
NEUTROPHILS # BLD AUTO: 3.04 THOUSANDS/ÂΜL (ref 1.85–7.62)
NEUTS SEG NFR BLD AUTO: 48 % (ref 43–75)
NRBC BLD AUTO-RTO: 0 /100 WBCS
PLATELET # BLD AUTO: 146 THOUSANDS/UL (ref 149–390)
PMV BLD AUTO: 12.1 FL (ref 8.9–12.7)
POTASSIUM SERPL-SCNC: 4 MMOL/L (ref 3.5–5.3)
RBC # BLD AUTO: 3.42 MILLION/UL (ref 3.81–5.12)
SODIUM SERPL-SCNC: 137 MMOL/L (ref 135–147)
WBC # BLD AUTO: 6.49 THOUSAND/UL (ref 4.31–10.16)

## 2024-09-30 PROCEDURE — 85025 COMPLETE CBC W/AUTO DIFF WBC: CPT | Performed by: INTERNAL MEDICINE

## 2024-09-30 PROCEDURE — 99232 SBSQ HOSP IP/OBS MODERATE 35: CPT | Performed by: INTERNAL MEDICINE

## 2024-09-30 PROCEDURE — 80048 BASIC METABOLIC PNL TOTAL CA: CPT | Performed by: INTERNAL MEDICINE

## 2024-09-30 PROCEDURE — C9254 INJECTION, LACOSAMIDE: HCPCS | Performed by: FAMILY MEDICINE

## 2024-09-30 RX ORDER — LORAZEPAM 2 MG/ML
0.5 INJECTION INTRAMUSCULAR ONCE
Status: COMPLETED | OUTPATIENT
Start: 2024-09-30 | End: 2024-09-30

## 2024-09-30 RX ADMIN — SODIUM CHLORIDE, SODIUM LACTATE, POTASSIUM CHLORIDE, AND CALCIUM CHLORIDE 500 ML: .6; .31; .03; .02 INJECTION, SOLUTION INTRAVENOUS at 10:42

## 2024-09-30 RX ADMIN — CLOBAZAM 10 MG: 10 TABLET ORAL at 22:30

## 2024-09-30 RX ADMIN — CANNABIDIOL 400 MG: 100 SOLUTION ORAL at 08:50

## 2024-09-30 RX ADMIN — TOPIRAMATE 250 MG: 100 TABLET, FILM COATED ORAL at 17:54

## 2024-09-30 RX ADMIN — CANNABIDIOL 400 MG: 100 SOLUTION ORAL at 18:03

## 2024-09-30 RX ADMIN — CLOBAZAM 10 MG: 10 TABLET ORAL at 08:45

## 2024-09-30 RX ADMIN — TOPIRAMATE 250 MG: 100 TABLET, FILM COATED ORAL at 08:45

## 2024-09-30 RX ADMIN — ENOXAPARIN SODIUM 40 MG: 40 INJECTION SUBCUTANEOUS at 08:44

## 2024-09-30 RX ADMIN — CANNABIDIOL 100 MG: 100 SOLUTION ORAL at 17:55

## 2024-09-30 RX ADMIN — BRIVARACETAM 100 MG: 10 SOLUTION ORAL at 08:44

## 2024-09-30 RX ADMIN — LACOSAMIDE 200 MG: 10 INJECTION INTRAVENOUS at 12:10

## 2024-09-30 RX ADMIN — CANNABIDIOL 1 ML: 100 SOLUTION ORAL at 08:48

## 2024-09-30 RX ADMIN — CEFTRIAXONE 1000 MG: 1 INJECTION, POWDER, FOR SOLUTION INTRAMUSCULAR; INTRAVENOUS at 22:30

## 2024-09-30 RX ADMIN — LORAZEPAM 0.5 MG: 2 INJECTION INTRAMUSCULAR; INTRAVENOUS at 00:17

## 2024-09-30 RX ADMIN — BRIVARACETAM 100 MG: 10 SOLUTION ORAL at 17:55

## 2024-09-30 RX ADMIN — LORAZEPAM 0.5 MG: 2 INJECTION INTRAMUSCULAR; INTRAVENOUS at 22:57

## 2024-09-30 RX ADMIN — LACOSAMIDE 200 MG: 10 INJECTION INTRAVENOUS at 22:30

## 2024-09-30 NOTE — ASSESSMENT & PLAN NOTE
UA (+) for leukocytes and nitrates and urine microscopy positive for innumerable bacteria and leukocytes  Urine culture pre aponte with enterococcus and aerococcus, awaiting final sensitivities   Last dose of ceftriaxone 9/30 will monitor off antibiotics  1/2 BC (+) for staph coag negative, likely contaminant and will follow  Hemodynamically stable and afebrile otherwise

## 2024-09-30 NOTE — ASSESSMENT & PLAN NOTE
1/2 BC (+) for staph coag negative, likely contaminant   Follow additional cultures, additional BC negative x 48 hours  Continue IV abx for UTI as above   Trend closely  Repeat blood cultures pending-growth at 24 hours primary suspicion for contamination  She received last dose of antibiotic for UTI tonight and will be monitored off all antibiotics

## 2024-09-30 NOTE — PROGRESS NOTES
Progress Note - Hospitalist   Name: Maira Bull 44 y.o. female I MRN: [id number]  [facility] Date of Admission: 9/25/2024   Date of Service: 9/30/2024 I Hospital Day: 5    Assessment & Plan  Intractable Lennox-Gastaut syndrome with breakthrough seizures  Pt presented to [facility] for intractable seizures   CT head unremarkable  Possible UTI, (+) UA, awaiting final urine culture results, plan for this as noted below  Most recent hospital stay at [facility] from 8/7-8/13 for VNS battery exchange and recent setting changes by neurology 9/19/2024.    Transferred to [facility] for vEEG monitoring   Neurology evaluated,  Likely breakthrough seizure in setting of underlying infection    Briviact increased to 100 mg BID, continue   Continue rest of home seizure medications of clobazam 10 mg BID, lacosamide 200 mg twice daily, rufinamide 1600 mg twice daily, topiramate 250 mg twice daily and Epidiolex 500 mg BID  Pt now back to baseline and therefore no indication for vEEG  Seizure precautions  Follow up topiramate, rufinamide and clobazam levels  Lacosamide level WNL  Hypotension  Noted this hospitalization, last BP check of 78/49  Recommend recheck and manual pressures   Possibly in setting of hypovolemia? Vs. Underlying infection   Pt remains afebrile  Continue IV abx as below   Will give 1 L fluid bolus of LR and monitor blood pressures   Will check a.m. cortisol, may need cosyntropin stim test  Cerebral anoxic injury (HCC)  History of cerebral anoxic injury, nonverbal at baseline, patient with significant debility and is a nursing home resident.   Continue with supportive care    Moderate protein-calorie malnutrition (HCC)  G tube in place  IR evaluated on 9/26 due to concerns for dislodgement  S/p G tube exchange, now functioning properly   Continue with tube feeds per nutrition recommendations and medications through G tube  BMI of 19.81  Acute cystitis  UA (+) for leukocytes and nitrates and urine  microscopy positive for innumerable bacteria and leukocytes  Urine culture pre aponte with enterococcus and aerococcus, awaiting final sensitivities   Last dose of ceftriaxone  will monitor off antibiotics  1/2 BC (+) for staph coag negative, likely contaminant and will follow  Hemodynamically stable and afebrile otherwise     Positive blood culture  1/2 BC (+) for staph coag negative, likely contaminant   Follow additional cultures, additional BC negative x 48 hours  Continue IV abx for UTI as above   Trend closely  Repeat blood cultures pending-growth at 24 hours primary suspicion for contamination  She received last dose of antibiotic for UTI tonight and will be monitored off all antibiotics    VTE Pharmacologic Prophylaxis:   Moderate Risk (Score 3-4) - Pharmacological DVT Prophylaxis Ordered: enoxaparin (Lovenox).    Mobility:   Basic Mobility Inpatient Raw Score: 6  JH-HLM Goal: 2: Bed activities/Dependent transfer  JH-HLM Achieved: 2: Bed activities/Dependent transfer  JH-HLM Goal achieved. Continue to encourage appropriate mobility.    Patient Centered Rounds: I performed bedside rounds with nursing staff today.   Discussions with Specialists or Other Care Team Provider:     Education and Discussions with Family / Patient: Attempted to update  (brother) via phone. Unable to contact.    Current Length of Stay: 5 day(s)  Current Patient Status: Inpatient   Certification Statement: The patient will continue to require additional inpatient hospital stay due to hypotension  Discharge Plan: Anticipate discharge in 24-48 hrs to discharge location to be determined pending rehab evaluations.    Code Status: Level 1 - Full Code    Subjective   Patient is nonverbal, appears comfortable today    Objective     Vitals:   Temp (24hrs), Av.8 °F (36.6 °C), Min:97.6 °F (36.4 °C), Max:98 °F (36.7 °C)    Temp:  [97.6 °F (36.4 °C)-98 °F (36.7 °C)] 98 °F (36.7 °C)  HR:  [60-79] 69  Resp:  [12] 12  BP:  (73-88)/(41-62) 82/50  SpO2:  [97 %-100 %] 100 %  Body mass index is 19.81 kg/m².     Input and Output Summary (last 24 hours):     Intake/Output Summary (Last 24 hours) at 9/30/2024 1246  Last data filed at 9/29/2024 1424  Gross per 24 hour   Intake 206 ml   Output --   Net 206 ml       Physical Exam  Constitutional:       General: She is not in acute distress.     Appearance: She is not toxic-appearing or diaphoretic.   Eyes:      General: No scleral icterus.  Cardiovascular:      Rate and Rhythm: Normal rate and regular rhythm.      Heart sounds: No murmur heard.     No friction rub. No gallop.   Pulmonary:      Effort: No respiratory distress.      Breath sounds: No stridor. No wheezing, rhonchi or rales.   Chest:      Chest wall: No tenderness.   Abdominal:      General: There is no distension.      Palpations: There is no mass.      Tenderness: There is no abdominal tenderness. There is no guarding or rebound.      Hernia: No hernia is present.   Musculoskeletal:         General: No swelling or tenderness.   Neurological:      Comments:   Nonverbal  Patient resting comfortably          Lines/Drains:  Lines/Drains/Airways       Active Status       Name Placement date Placement time Site Days    Gastrostomy/Enterostomy Percutaneous Interventional Gastrostomy 16 Fr. LUQ 09/26/24  1237  LUQ  4                            Lab Results: I have reviewed the following results: CBC/BMP:   .     09/30/24  0520   WBC 6.49   HGB 11.0*   HCT 34.3*   *   SODIUM 137   K 4.0      CO2 21   BUN 12   CREATININE 0.32*   GLUC 125       Results from last 7 days   Lab Units 09/30/24  0520   WBC Thousand/uL 6.49   HEMOGLOBIN g/dL 11.0*   HEMATOCRIT % 34.3*   PLATELETS Thousands/uL 146*   SEGS PCT % 48   LYMPHO PCT % 36   MONO PCT % 10   EOS PCT % 5     Results from last 7 days   Lab Units 09/30/24  0520 09/29/24  0345   SODIUM mmol/L 137 137   POTASSIUM mmol/L 4.0 3.6   CHLORIDE mmol/L 108 108   CO2 mmol/L 21 21   BUN  mg/dL 12 13   CREATININE mg/dL 0.32* 0.37*   ANION GAP mmol/L 8 8   CALCIUM mg/dL 8.6 9.1   ALBUMIN g/dL  --  3.7   TOTAL BILIRUBIN mg/dL  --  0.19*   ALK PHOS U/L  --  83   ALT U/L  --  34   AST U/L  --  26   GLUCOSE RANDOM mg/dL 125 147*                 Results from last 7 days   Lab Units 09/25/24  0030 09/24/24  2325   LACTIC ACID mmol/L 1.8  --    PROCALCITONIN ng/ml  --  <0.05       Recent Cultures (last 7 days):   Results from last 7 days   Lab Units 09/28/24  1523 09/25/24  0030 09/24/24  2220   BLOOD CULTURE  No Growth at 24 hrs.  No Growth at 24 hrs. No Growth After 5 Days.  Staphylococcus coagulase negative*  --    GRAM STAIN RESULT   --  Gram positive cocci in clusters*  --    URINE CULTURE   --   --  80,000-89,000 cfu/ml Escherichia coli*  80,000-89,000 cfu/ml Aerococcus urinae*  20,000-29,000 cfu/ml Enterococcus species*       Imaging Review: No pertinent imaging studies reviewed.  Other Studies: No additional pertinent studies reviewed.    Last 24 Hours Medication List:     Current Facility-Administered Medications:     Brivaracetam (BRIVIACT) oral solution 100 mg, BID    cannabidiol (EPIDIOLEX) oral solution 100 mg, BID **AND** cannabidiol (EPIDIOLEX) oral solution 400 mg, BID    cefTRIAXone (ROCEPHIN) 1,000 mg in dextrose 5 % 50 mL IVPB, Q24H, Last Rate: 1,000 mg (09/29/24 2223)    cloBAZam (ONFI) tablet 10 mg, Q12H NANDO    enoxaparin (LOVENOX) subcutaneous injection 40 mg, Q24H NANDO    lacosamide (VIMPAT) injection 200 mg, Q12H    Rufinamide SUSP 1,600 mg, Q12H NANDO    topiramate (TOPAMAX) tablet 250 mg, BID    Administrative Statements   Today, Patient Was Seen By: Rogelio Garrett DO      **Please Note: This note may have been constructed using a voice recognition system.**

## 2024-09-30 NOTE — ASSESSMENT & PLAN NOTE
Noted this hospitalization, last BP check of 78/49  Recommend recheck and manual pressures   Possibly in setting of hypovolemia? Vs. Underlying infection   Pt remains afebrile  Continue IV abx as below   Will give 1 L fluid bolus of LR and monitor blood pressures   Will check a.m. cortisol, may need cosyntropin stim test

## 2024-09-30 NOTE — PLAN OF CARE
Problem: NEUROSENSORY - ADULT  Goal: Remains free of injury related to seizures activity  Description: INTERVENTIONS  - Maintain airway, patient safety  and administer oxygen as ordered  - Monitor patient for seizure activity, document and report duration and description of seizure to physician/advanced practitioner  - If seizure occurs,  ensure patient safety during seizure  - Reorient patient post seizure  - Seizure pads on all 4 side rails  - Instruct patient/family to notify RN of any seizure activity including if an aura is experienced  - Instruct patient/family to call for assistance with activity based on nursing assessment  - Administer anti-seizure medications if ordered    Outcome: Progressing     Problem: Nutrition/Hydration-ADULT  Goal: Nutrient/Hydration intake appropriate for improving, restoring or maintaining nutritional needs  Description: Monitor and assess patient's nutrition/hydration status for malnutrition. Collaborate with interdisciplinary team and initiate plan and interventions as ordered.  Monitor patient's weight and dietary intake as ordered or per policy. Utilize nutrition screening tool and intervene as necessary. Determine patient's food preferences and provide high-protein, high-caloric foods as appropriate.     INTERVENTIONS:  - Monitor oral intake, urinary output, labs, and treatment plans  - Assess nutrition and hydration status and recommend course of action  - Evaluate amount of meals eaten  - Assist patient with eating if necessary   - Allow adequate time for meals  - Recommend/ encourage appropriate diets, oral nutritional supplements, and vitamin/mineral supplements  - Order, calculate, and assess calorie counts as needed  - Recommend, monitor, and adjust tube feedings and TPN/PPN based on assessed needs  - Assess need for intravenous fluids  - Provide specific nutrition/hydration education as appropriate  - Include patient/family/caregiver in decisions related to  nutrition  Outcome: Progressing

## 2024-09-30 NOTE — RESTORATIVE TECHNICIAN NOTE
Restorative Technician Note      Patient Name: Maira Bull     Note Type: Mobility  Patient Position Upon Consult: Supine  Activity Performed: Repositioned  Education Provided: Yes  Patient Position at End of Consult: Supine; All needs within reach; Bed/Chair alarm activated    Kenton DURAN, Restorative Technician,

## 2024-09-30 NOTE — ASSESSMENT & PLAN NOTE
Pt presented to [facility] for intractable seizures   CT head unremarkable  Possible UTI, (+) UA, awaiting final urine culture results, plan for this as noted below  Most recent hospital stay at [facility] from 8/7-8/13 for VNS battery exchange and recent setting changes by neurology 9/19/2024.    Transferred to [facility] for vEEG monitoring   Neurology evaluated,  Likely breakthrough seizure in setting of underlying infection    Briviact increased to 100 mg BID, continue   Continue rest of home seizure medications of clobazam 10 mg BID, lacosamide 200 mg twice daily, rufinamide 1600 mg twice daily, topiramate 250 mg twice daily and Epidiolex 500 mg BID  Pt now back to baseline and therefore no indication for vEEG  Seizure precautions  Follow up topiramate, rufinamide and clobazam levels  Lacosamide level WNL

## 2024-10-01 VITALS
HEIGHT: 60 IN | OXYGEN SATURATION: 99 % | WEIGHT: 101.41 LBS | TEMPERATURE: 98.1 F | HEART RATE: 83 BPM | DIASTOLIC BLOOD PRESSURE: 62 MMHG | SYSTOLIC BLOOD PRESSURE: 95 MMHG | RESPIRATION RATE: 18 BRPM | BODY MASS INDEX: 19.91 KG/M2

## 2024-10-01 LAB
ALBUMIN SERPL BCG-MCNC: 3.8 G/DL (ref 3.5–5)
ALP SERPL-CCNC: 82 U/L (ref 34–104)
ALT SERPL W P-5'-P-CCNC: 35 U/L (ref 7–52)
ANION GAP SERPL CALCULATED.3IONS-SCNC: 8 MMOL/L (ref 4–13)
AST SERPL W P-5'-P-CCNC: 24 U/L (ref 13–39)
BASOPHILS # BLD AUTO: 0.07 THOUSANDS/ÂΜL (ref 0–0.1)
BASOPHILS NFR BLD AUTO: 1 % (ref 0–1)
BILIRUB SERPL-MCNC: 0.25 MG/DL (ref 0.2–1)
BUN SERPL-MCNC: 12 MG/DL (ref 5–25)
CALCIUM SERPL-MCNC: 8.9 MG/DL (ref 8.4–10.2)
CHLORIDE SERPL-SCNC: 109 MMOL/L (ref 96–108)
CO2 SERPL-SCNC: 20 MMOL/L (ref 21–32)
CORTIS AM PEAK SERPL-MCNC: 14.5 UG/DL (ref 6.7–22.6)
CREAT SERPL-MCNC: 0.36 MG/DL (ref 0.6–1.3)
EOSINOPHIL # BLD AUTO: 0.23 THOUSAND/ÂΜL (ref 0–0.61)
EOSINOPHIL NFR BLD AUTO: 3 % (ref 0–6)
ERYTHROCYTE [DISTWIDTH] IN BLOOD BY AUTOMATED COUNT: 12.6 % (ref 11.6–15.1)
GFR SERPL CREATININE-BSD FRML MDRD: 132 ML/MIN/1.73SQ M
GLUCOSE SERPL-MCNC: 141 MG/DL (ref 65–140)
HCT VFR BLD AUTO: 35.1 % (ref 34.8–46.1)
HGB BLD-MCNC: 11.6 G/DL (ref 11.5–15.4)
IMM GRANULOCYTES # BLD AUTO: 0.03 THOUSAND/UL (ref 0–0.2)
IMM GRANULOCYTES NFR BLD AUTO: 0 % (ref 0–2)
LYMPHOCYTES # BLD AUTO: 2.02 THOUSANDS/ÂΜL (ref 0.6–4.47)
LYMPHOCYTES NFR BLD AUTO: 26 % (ref 14–44)
MCH RBC QN AUTO: 32.4 PG (ref 26.8–34.3)
MCHC RBC AUTO-ENTMCNC: 33 G/DL (ref 31.4–37.4)
MCV RBC AUTO: 98 FL (ref 82–98)
MONOCYTES # BLD AUTO: 0.77 THOUSAND/ÂΜL (ref 0.17–1.22)
MONOCYTES NFR BLD AUTO: 10 % (ref 4–12)
NEUTROPHILS # BLD AUTO: 4.59 THOUSANDS/ÂΜL (ref 1.85–7.62)
NEUTS SEG NFR BLD AUTO: 60 % (ref 43–75)
NRBC BLD AUTO-RTO: 0 /100 WBCS
PLATELET # BLD AUTO: 211 THOUSANDS/UL (ref 149–390)
PMV BLD AUTO: 12.4 FL (ref 8.9–12.7)
POTASSIUM SERPL-SCNC: 3.6 MMOL/L (ref 3.5–5.3)
PROT SERPL-MCNC: 6.8 G/DL (ref 6.4–8.4)
RBC # BLD AUTO: 3.58 MILLION/UL (ref 3.81–5.12)
SODIUM SERPL-SCNC: 137 MMOL/L (ref 135–147)
WBC # BLD AUTO: 7.71 THOUSAND/UL (ref 4.31–10.16)

## 2024-10-01 PROCEDURE — 85025 COMPLETE CBC W/AUTO DIFF WBC: CPT | Performed by: INTERNAL MEDICINE

## 2024-10-01 PROCEDURE — 80053 COMPREHEN METABOLIC PANEL: CPT | Performed by: INTERNAL MEDICINE

## 2024-10-01 PROCEDURE — 99239 HOSP IP/OBS DSCHRG MGMT >30: CPT | Performed by: STUDENT IN AN ORGANIZED HEALTH CARE EDUCATION/TRAINING PROGRAM

## 2024-10-01 PROCEDURE — C9254 INJECTION, LACOSAMIDE: HCPCS | Performed by: FAMILY MEDICINE

## 2024-10-01 PROCEDURE — 82533 TOTAL CORTISOL: CPT | Performed by: INTERNAL MEDICINE

## 2024-10-01 RX ORDER — LORAZEPAM 2 MG/ML
0.5 INJECTION INTRAMUSCULAR ONCE
Status: COMPLETED | OUTPATIENT
Start: 2024-10-01 | End: 2024-10-01

## 2024-10-01 RX ADMIN — ENOXAPARIN SODIUM 40 MG: 40 INJECTION SUBCUTANEOUS at 11:41

## 2024-10-01 RX ADMIN — CANNABIDIOL 100 MG: 100 SOLUTION ORAL at 12:01

## 2024-10-01 RX ADMIN — LORAZEPAM 0.5 MG: 2 INJECTION INTRAMUSCULAR; INTRAVENOUS at 01:24

## 2024-10-01 RX ADMIN — CANNABIDIOL 400 MG: 100 SOLUTION ORAL at 11:55

## 2024-10-01 RX ADMIN — BRIVARACETAM 100 MG: 10 SOLUTION ORAL at 11:39

## 2024-10-01 RX ADMIN — CLOBAZAM 10 MG: 10 TABLET ORAL at 11:42

## 2024-10-01 RX ADMIN — TOPIRAMATE 250 MG: 100 TABLET, FILM COATED ORAL at 11:41

## 2024-10-01 RX ADMIN — LORAZEPAM 0.5 MG: 2 INJECTION INTRAMUSCULAR; INTRAVENOUS at 05:08

## 2024-10-01 RX ADMIN — LACOSAMIDE 200 MG: 10 INJECTION INTRAVENOUS at 11:41

## 2024-10-01 NOTE — CASE MANAGEMENT
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/2020     Intellectual disability with epilepsy (HCC) 12/10/2019     Fatty infiltration of liver 2019     Moderate protein-calorie malnutrition (HCC) 2019     Dislodged gastrostomy tube 2019     S/P placement of VNS (vagus nerve stimulation) device 2019     Breast mass 01/15/2019     Sedated due to multiple medications 01/10/2019     Hypophosphatemia 2018     Right atrial enlargement 10/13/2018     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     Skin breakdown 10/09/2018     Incontinence 2018     Low blood pressure 2018     Somnolence 2018     Intractable Lennox-Gastaut syndrome with breakthrough seizures 2018     Labial cyst 2017     Positive blood culture 2017     Osteoporosis 2013     Onychomycosis 2013     Hyperkeratosis 2013     Cerebral anoxic injury (HCC) 2013       LOS (days): 6  Geometric Mean LOS (GMLOS) (days): 4.3  Days to GMLOS:-1.5     OBJECTIVE:  Risk of Unplanned Readmission Score: 15.12         Current admission status: Inpatient   Preferred Pharmacy:   [facility], [employer] [id number], [address] [place], [place]  [address] [facility] [address]  Phone: [phone] Fax: [phone]    [facility] - [place], [place] - [address]  [address]  [address]  [facility] [address]  Phone: [phone] Fax: [phone]    [facility] CATIE Tamayo - [address]  [address] [facility] [address]  Phone: [phone] Fax: [phone]    [facility] - [place], [place] - [address]  [address] [facility] [address]  Phone: [phone] Fax: [phone]    [facility] [place] [place] - [address]  [address]  [facility] [address]  Phone: [phone] Fax: [phone]    [id number] [place] - [address]  [address] [facility] [address]  Phone: [phone] Fax: [phone]    Primary Care Provider: Doe Miller DO    Primary Insurance: MEDICARE  Secondary Insurance: [facility]    DISCHARGE DETAILS:       Treatment Team Recommendation: Facility Return  Discharge Destination Plan:: Facility Return  Transport at Discharge : [facility]     Number/Name of Dispatcher: [facility]  Transported by (Company and Unit #): [facility]  ETA of Transport (Date): 10/01/24  ETA of Transport (Time): 1630        Additional Comments: Patient medically cleared for transfer back to [facility] Facility made aware, and can accept her back.  Transport arranged vi Roundtrip- [facility] at  4:30.    Accepting Facility Name, City & State : [facility] [place], [place]

## 2024-10-01 NOTE — DISCHARGE SUMMARY
Discharge Summary - Hospitalist   Name: Maira Bull 44 y.o. female I MRN: [id number]  [facility] Date of Admission: 9/25/2024   Date of Service: 10/1/2024 I Hospital Day: 6     Assessment & Plan  Intractable Lennox-Gastaut syndrome with breakthrough seizures  Pt presented to [facility] for intractable seizures   CT head unremarkable  Possible UTI, (+) UA, awaiting final urine culture results, plan for this as noted below  Most recent hospital stay at [facility] from 8/7-8/13 for VNS battery exchange and recent setting changes by neurology 9/19/2024.    Transferred to [facility] for vEEG monitoring   Neurology evaluated,  Likely breakthrough seizure in setting of underlying infection    Briviact increased to 100 mg BID, continue   Continue rest of home seizure medications of clobazam 10 mg BID, lacosamide 200 mg twice daily, rufinamide 1600 mg twice daily, topiramate 250 mg twice daily and Epidiolex 500 mg BID  Pt now back to baseline and therefore no indication for vEEG  Seizure precautions  Follow up topiramate, rufinamide and clobazam levels  Lacosamide level WNL  Hypotension  Noted this hospitalization, last BP check of 78/49  Recommend recheck and manual pressures   Possibly in setting of hypovolemia? Vs. Underlying infection   Pt remains afebrile  Continue IV abx as below   Will give 1 L fluid bolus of LR and monitor blood pressures   Will check a.m. cortisol, may need cosyntropin stim test  Cerebral anoxic injury (HCC)  History of cerebral anoxic injury, nonverbal at baseline, patient with significant debility and is a nursing home resident.   Continue with supportive care    Moderate protein-calorie malnutrition (HCC)  G tube in place  IR evaluated on 9/26 due to concerns for dislodgement  S/p G tube exchange, now functioning properly   Continue with tube feeds per nutrition recommendations and medications through G tube  BMI of 19.81  Acute cystitis  UA (+) for leukocytes and nitrates and  urine microscopy positive for innumerable bacteria and leukocytes  Urine culture pre aponte with enterococcus and aerococcus, awaiting final sensitivities   Last dose of ceftriaxone 9/30 will monitor off antibiotics  1/2 BC (+) for staph coag negative, likely contaminant and will follow  Hemodynamically stable and afebrile otherwise     Positive blood culture  1/2 BC (+) for staph coag negative, likely contaminant   Follow additional cultures, additional BC negative x 48 hours  Continue IV abx for UTI as above   Trend closely  Repeat blood cultures pending-growth at 24 hours primary suspicion for contamination  She received last dose of antibiotic for UTI tonight and will be monitored off all antibiotics  Dislodged gastrostomy tube  G tube exchange performed by Dr Mcallister without complication on 9/26  Continue tube feeds Jevity 1.2 cyclic 52 mL/h over 20 hours with 100 mL water flush every 6 hours, around tube feeds from 12 PM to 8 AM           Medical Problems       Resolved Problems  Date Reviewed: 9/19/2024   None       Discharging Physician / Practitioner: Evi Barrios MD  PCP: Doe Miller DO  Admission Date:   Admission Orders (From admission, onward)       Ordered        09/25/24 2104  INPATIENT ADMISSION  Once                          Discharge Date: 10/01/24    Consultations During Hospital Stay:  Neurology  IR    Procedures Performed:   G-tube exchange    Significant Findings / Test Results:   Breakthrough seizures, UTI, dislodged G-tube    Incidental Findings:   None    Test Results Pending at Discharge (will require follow up):   None     Outpatient Tests Requested:  None    Complications: None    Reason for Admission: Breakthrough seizures    Hospital Course:   Maira Bull is a 44 y.o. female patient who originally presented to the hospital on 9/25/2024 due to breakthrough seizures in the setting of UTI.  CT head unremarkable.  Patient was transferred to [facility] for video EEG but after evaluation by  neurology and improvement of symptoms with UTI treatment, no indication for repeat video EEG.  Patient completed course of IV antibiotics.  During hospital stay G-tube got dislodged and was exchanged by IR 9/26.  Patient remained seizure-free during hospital stay and was transferred to facility in stable condition    Please see above list of diagnoses and related plan for additional information.     Condition at Discharge: stable    Discharge Day Visit / Exam:   Subjective: No events overnight.  Vitals: Blood Pressure: 95/57 (10/01/24 0804)  Pulse: 85 (10/01/24 1138)  Temperature: 99.4 °F (37.4 °C) (10/01/24 0804)  Temp Source: Axillary (09/30/24 2139)  Respirations: 14 (09/30/24 2139)  Height: 5' (152.4 cm) (09/25/24 2106)  Weight - Scale: 46 kg (101 lb 6.6 oz) (weight for this bed.) (09/26/24 0500)  SpO2: 100 % (10/01/24 1138)  Physical Exam  Vitals and nursing note reviewed.   Constitutional:       General: She is not in acute distress.     Appearance: She is not ill-appearing.   HENT:      Head: Normocephalic and atraumatic.   Eyes:      Conjunctiva/sclera: Conjunctivae normal.   Cardiovascular:      Rate and Rhythm: Normal rate and regular rhythm.   Abdominal:      General: Bowel sounds are normal. There is no distension.      Palpations: Abdomen is soft.      Tenderness: There is no abdominal tenderness.   Musculoskeletal:         General: No swelling.   Skin:     General: Skin is warm and dry.   Neurological:      Mental Status: She is alert. Mental status is at baseline.          Discussion with Family: Attempted to update  (brother) via phone. Left voicemail.     Discharge instructions/Information to patient and family:   See after visit summary for information provided to patient and family.      Provisions for Follow-Up Care:  See after visit summary for information related to follow-up care and any pertinent home health orders.      Mobility at time of Discharge:   Basic Mobility Inpatient  Raw Score: 6  JH-HLM Goal: 2: Bed activities/Dependent transfer  JH-HLM Achieved: 2: Bed activities/Dependent transfer  HLM Goal achieved. Continue to encourage appropriate mobility.     Disposition:   Other Skilled Nursing Facility at [place]     Planned Readmission: None    Discharge Medications:  See after visit summary for reconciled discharge medications provided to patient and/or family.      Administrative Statements   Discharge Statement:  I have spent a total time of 45 minutes in caring for this patient on the day of the visit/encounter. >30 minutes of time was spent on: Patient and family education, Impressions, Counseling / Coordination of care, Documenting in the medical record, Reviewing / ordering tests, medicine, procedures  , and Communicating with other healthcare professionals .    **Please Note: This note may have been constructed using a voice recognition system**

## 2024-10-01 NOTE — NURSING NOTE
Pt was agitated ,screaming and yelling provider reach out ,ordered iv ativan around 2243,pt had it which help a little bit and started the screaming again ,provider reach out for the second time ,ordered 2nd dose of ativan ,which help pt to sleep

## 2024-10-01 NOTE — ASSESSMENT & PLAN NOTE
G tube exchange performed by Dr Mcallister without complication on 9/26  Continue tube feeds Jevity 1.2 cyclic 52 mL/h over 20 hours with 100 mL water flush every 6 hours, around tube feeds from 12 PM to 8 AM

## 2024-10-01 NOTE — ASSESSMENT & PLAN NOTE
Pt presented to [facility] for intractable seizures   CT head unremarkable  Possible UTI, (+) UA, awaiting final urine culture results, plan for this as noted below  Most recent hospital stay at [facility] from 8/7-8/13 for VNS battery exchange and recent setting changes by neurology 9/19/2024.    Transferred to [facility] for vEEG monitoring   Neurology evaluated,  Likely breakthrough seizure in setting of underlying infection    Briviact increased to 100 mg BID, continue   Continue rest of home seizure medications of clobazam 10 mg BID, lacosamide 200 mg twice daily, rufinamide 1600 mg twice daily, topiramate 250 mg twice daily and Epidiolex 500 mg BID  Pt now back to baseline and therefore no indication for vEEG  Seizure precautions  Follow up topiramate, rufinamide and clobazam levels  Lacosamide level WNL

## 2024-10-01 NOTE — PLAN OF CARE
Problem: Prexisting or High Potential for Compromised Skin Integrity  Goal: Skin integrity is maintained or improved  Description: INTERVENTIONS:  - Identify patients at risk for skin breakdown  - Assess and monitor skin integrity  - Assess and monitor nutrition and hydration status  - Monitor labs   - Assess for incontinence   - Turn and reposition patient  - Assist with mobility/ambulation  - Relieve pressure over bony prominences  - Avoid friction and shearing  - Provide appropriate hygiene as needed including keeping skin clean and dry  - Evaluate need for skin moisturizer/barrier cream  - Collaborate with interdisciplinary team   - Patient/family teaching  - Consider wound care consult   Outcome: Progressing     Problem: NEUROSENSORY - ADULT  Goal: Remains free of injury related to seizures activity  Description: INTERVENTIONS  - Maintain airway, patient safety  and administer oxygen as ordered  - Monitor patient for seizure activity, document and report duration and description of seizure to physician/advanced practitioner  - If seizure occurs,  ensure patient safety during seizure  - Reorient patient post seizure  - Seizure pads on all 4 side rails  - Instruct patient/family to notify RN of any seizure activity including if an aura is experienced  - Instruct patient/family to call for assistance with activity based on nursing assessment  - Administer anti-seizure medications if ordered    Outcome: Progressing      n/a

## 2024-10-02 LAB
CLOBAZAM SERPL-MCNC: 57 NG/ML (ref 30–300)
NORCLOBAZAM SERPL-MCNC: 2601 NG/ML (ref 300–3000)
RUFINAMIDE SERPL-MCNC: 12.3 UG/ML

## 2024-10-04 LAB
BACTERIA BLD CULT: NORMAL
BACTERIA BLD CULT: NORMAL

## 2024-10-24 NOTE — PROGRESS NOTES
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  7 - give diazepam 5mg/mL 1 mL as needed for seizure last more than 3 minutes and repeat second dose for seizure lasting more than 10 minutes  8 - Call the office if there is increase in seizure frequency  9 - if there are more seizures; then will need to consider replacing lacosamide with Fintepla (this medication requires every 6 months echocardiogram to evaluate for valvular dysfunction and pulmonary hypertension.  Intractable Lennox-Gastaut syndrome without status epilepticus (HCC)         She will No follow-ups on file.     Assessment:  Ms. Maira Bull is a 44 y.o. woman with intractable Lennox Gastaut Syndrome, with frequent tonic seizures (especially when she is asleep).  These seizures are often short lasting and may go un-noticed.  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  Her EEG studies show both generalized onset and potential risk for focal onset seizures.  She requires VNS therapy, as when the VNS battery was running low, she had an increase in seizure frequency and severity.  Prior attempts to wean off antiseizure medications have resulted in breakthrough seizures.  We had previously tried to wean off of clobazam  due to excessive sedation but this has lead to an increase in tonic seizure activity.  No medication changes are made during this visit.  She is essentially at maximal doses of current antiseizure medications.  If she is to continue with seizures, then a new medication will have to be considered.    I spoke with her brother regarding Fintepla as the next antiseizure medication option, reviewed the side effects of valvular heart disease and pulmonary hypertension.  This medication requires provider and guardian to enroll in the REMS program and have regular every 6 months echocardiogram (along with echocardiogram prior to starting the medication and 6 months after the last dose of the medication).    I will hold off on starting her on Fintepla unless there is an increase in seizures being reported.  Brother's Oneil Quintero [phone] - to call for consent and enrollment in REMS program     Plan:   Medications are given by PEG tube  1 - Continue Epidiolex 100mg/mL 5.0 mL (500mg) every 12 hours  2 - Continue Briviact 100 mg tab one tabe every 12 hours  3 - Continue with Topiramate 50mg tabs give 5 tabs (250mg) every 12 hours  4 - continue Banzel 400mg give 4 tabs every 12 hours  5 - Continue Clobazam 10mg tab give one tab every 12 hours  6 - Continue Lacosamide 200mg tab one tab every 12 hours.  7 - give diazepam 5mg/mL 1 mL as needed for seizure last more than 3 minutes and repeat second dose for seizure lasting more than 10 minutes  8 - Call the office if there is increase in seizure frequency  9 - if there are more seizures; then will need to consider replacing lacosamide with Fintepla (this medication requires every 6 months echocardiogram to evaluate for valvular dysfunction and pulmonary hypertension.  10 - follow-up in 3 months with advanced practitioner      Chief Complaint:    Chief Complaint    Follow-up; Seizures           HPI:    Maira Bull is a 44 y.o.female here for follow-up evaluation of intractable  epilepsy in the setting of LGS.      Interval History 10/25/2024  Spoke with ZEE Acuña on the unit.  There have been no report of recent seizures.  Hospitalizations  End of June there was a seizure that lasted 3 minutes.    8/6/2024 - 10-15 seziures in one day, found to need a VNS battery change.  8/31/2024 - 5 seizures within a 90 minute period.  She was having recurrent seizures every 3 minutes starting around 9/16/2024 every couple of days, then on 9/23/2024, she had continuous seizures, no improvement with diazepam.  Mostly tonic seizures and body arching.  9/25-10/1/2024 - seizures possibly triggered by UTI  Arianne reports that there have been no seizures since she has returned to the residential facility.    AED/side effects/compliance:  Banzel 400mg give 4 tabs twice a day   Topiramate 250mg tab twice a day   Epidiolex 500mg twice a day  Briviact 100 mg twice a day  Lacosamide 200-200  Onfi 10-10    Event/Seizure semiology:  Seizure semiology:  She was described to have drop attacks or spells with grunting sounds and falling to the floor.      Her nurse commented on episodes of spasms or myoclonic jerking of the right arm.    Generalized convulsions  Tonic seizures of eyes rolling back (mostly during sleep)    Prior Epilepsy History:  Collective epilepsy history 11/6/2014 was previously evaluated by Dr. Harper including a hospitalization in February 2013 for status epilepticus, in the setting of missed doses of AEDs due to refusal to eat. She subsequently required anesthetic induced coma to stop her seizures and PEG tube was placed. She was seen almost every month for management of her seizures up until November 2013. She has medically refractory seizures and had a VNS placed possibly in the early 2000s and a generator changed in 2011. Unknown AEDs that were tried but felbamate is listed as an allergy. In 2011, her AEDs were clonazepam, levetiracetam, Depakote and Lamictal. Dr. Harper had started Kel in  2011. In 2012, Onfi was added with the reduction of clonazepam. There were difficulty with documenting seizure frequency; but seizures were no longer daily occurrences but there were clusters of seizures. There would be good periods and bad periods of seizure frequency. Dr. Harper had been increasing the duty cycle of the VNS over the course of 2012 to 2013. Sometime between August 2013 and October 2013, topiramate was introduced.  She was in status epilepticus in 2013, she was on Keppra, Banzel, Onfi, and Lamictal. She had an EEG at some point that showed multifocal spike-wave and background attenuation. She has intermittent agitation and day time somnolence. When she was hospitalized for status epilepticus, she was started on methylphenidate to help wake her up.     Intake history November 2014:  VPA level 127.  Her Valproic Acid dose was previously 250mg q6hrs based on Dr. Harper's notes. Since July 2014, she has been receiving VPA 500mg q6hrs. She has not been hospitalized since September 2013. She was previously ambulatory with therapy. She spends most of her time sleeping for the past couple of months.  She has also been receiving lactulose for elevated ammonia levels.   (older drugs VPA, LVT, LMG, newer drugs added on since 2011 RFN, Onfi, TPM)     Summary of 2015:  We decreased VPA from TDD 2000mg to 1000mg with increased agitation/combativeness, alternating days of exhaustion (no behavioral specialist has been involved). are randomly reported by multiple staff members. Seizures are typically reported by CNA's or her one to one on the 3PM to 11PM shift. Seizures are described a brief events of eyes rolling back and arms going up in the air, followed by hair pulling or slapping herself. These seizures were reported as either sporadic or every other day episodes.  There have been no documented grand mal seizures or drop attacks. She refuses to wear safety helmet, rips at her hair, and attempts to flip herself out  of her chair and bed.  Maira can walk briefly but walks on her toes, she frequently will allow herself to fall down when she does not want to walk.  It does not appear that methylphenidate has been useful in maintaining her level of wakefulness during the day.    VNS generator change on 11/3/2015. The Model 103 (serial #[id number]) was replaced with [employer] Model 105, serial #[id number]. Weaned off of levetiracetam due to multiple medications and agitation; by the end of 2016, she was down to -500.      Summary of 2016:  Started with RFN [id number], -250, CLB 0.5-0.5-25,  QID, -200 attempted to wean off of LEV and reduced the dose of VPA given that there were no reports of seizures and she was sedated, along with elevated ammonia levels.  It was unclear as to how effective LEV was for her seizure control.  When she missed a dose of TPM in September 2016, she had multiple seizures.  We attempted to compensate for increase in seizures by increasing Onfi to 20-20; however, it seems that it made her more sedated.     Summary of 2017  RFN [id number], -250, Onfi 20-20, -200,  q8hrs. She has been more somnolent.  She continues to have tonic seizures when she is asleep, eyes rolling body, arms will tense up with some twitching, last a few seconds, but will recur. There are no seizures during the daytime.  We attempted to wean off of VPA due to ammonia / somnolence; but there was an increase in tonic seizures (tonic stiffness, eyes rolling upwards, and subtle arm (right?) jerking).     Hospital admission 10/10-10/26/2017, due to seizures in setting of infection, was put on continuous video EEG monitoring to determine her seizure type, seizure frequency, and rapid medication adjustments.  Rapid med changes: d/c'ed lamotrigine, restarted levetiracetam, attempted discontinuation of valproic acid (more seizures, so restarted), attempted reduction in Onfi, and starting Fycompa.  The  patient's seizures were mostly based during sleep, tonic seizures.      Summary of 2018  Started with RFN [id number], PER 8, LEV 3728-3698, CLB 5-15, -250,  TID.  Due to excessive somnolence Onfi was weaned off.  She was on continuous EEG monitoring when she was in hospital for PNA that showed very frequent tonic seizures during sleep.  VPA is causing hyperammoniemia.  She has had more admissions for somnolence / lethargy, VPA was reduced for transaminitis.  She was tested for inborn error of metabolism with plasma amino acid, urine organic acid, and acylcarnitine levels.  There were nonspecific elevations in some amino acid or organic acid but no pattern consistent with a specific inborn error of metabolism.  Elevated carnitine level was consistent with supplementation.  Higher doses of phenobarbital may also contribute to sedation, phenobarbital was reduced to 20mg but on follow-up she was on 20mL of oral solution (80mg / day).    There is variable reporting in the frequency of tonic seizures (these are the eyes are rolling back and shaking, then stop, then happen again in 10-15 minutes).  Her level of alertness is also variable with erratic sleep cycle.    Summary 2019  We started the year with  TID, RFN 6795-9525, -200, LEV 3656-6492, PER 10, PB 40 qHS.  In December 2018, Hospital admission for septic shock and aspiration PNA.  She had an EEG that captured recurrent tonic seizures during sleep but this is consistently found in all of her other EEG studies.  Another hospital admission January 2019 for recurrent convulsive seizures.  Due to sedation Phenobarbital was weaned off and Fycompa was increased to 10mg at bedtime.  TPM increased to 250-250.  Tried to wean off of VPA due to ammonia levels.  We started Epidiolex in the Spring 2019, which seems to have improved seizure control.    January 2019 - Her CNA reports that Maira is no longer Maira, she is essentially bed ridden.  She  does not eat and does not walk.  Maira usually participate in activities, walking, and eating food (if she was at a [employer] she would be able to eat a hamburger without difficulty).  I was able to speak to her father Oneil and he reported that he too saw a significant decline in mental status.  There was a phone call from [facility] reporting an increase in seizure activity (brief episodes tonic-myoclonic jerking); but subsequent reports did not notice an increase in activity.      Weaned off of phenobarbital, suspecting that it was causing elevated transaminases.  There is variable periods of wakefulness and alertness; seizures are reported sporadically, sometimes she seems to have clusters of seizures (during an office visit there were about 8 tonic seizures lasting 15-20 seconds).  We weaned her off of valproic acid and increased Epidiolex.    June 2019, hospitalized for status epilepticus (multiple clinical seizures, every 5-6 minutes, associated with apnea).  Continuous EEG monitoring showed very frequent 15-30 seconds tonic seizures , whether this is different from her baseline is difficult to determine.  Since she was on continuous EEG monitoring a number of medication trials were given.  At one point it seems that lorazepam and more levetiracetam had improved seizures.  Her tube feed was adjust to more protein and lower carbohydrates.  2019 - multiple phone calls regarding recurrent seizures (generalized shaking, tonic body rigidity and clonic activity)    Summary 2020  RUF [id number], -250, LEV 1000 q8hr, -400, JESSICA 50-50, CLB 5 qHS.  Replacement of her VNS in December 2019, went from M105 to  model.  Seizure reporting had been sporadic.  Despite medication adjustments, there has been no improvement in degree of wakefulness, nonverbal, calls out at times.  She does not do much.  Transitioned LEV to Brivaracetam.  Increased clobazam to 10mg at bedtime on 5/28/2020.  Unclear if  there has been improvement in seizure frequency; but there were no admissions to hospital for seizures.    Summary 2021  RUF [id number], -250, -400, JESSICA 75-75, CLB 5-10.  She was admitted to Hospital on 2/2/2021 for multiple seizures in one day.  Onfi was increased with an extra 5mg tab in AM.  With the increase in Onfi, there has been no report of excessive sedation.  She continues to have stretches of non-24 hour sleep cycle.  She has periods when she appears to be active, smiling and laughter and periods when she has no interactions with those around her.  Her seizures are very difficult to notice, unless someone is with her.  We had tried to reduce her Brivaracetam dose as there was no clear improvement at higher dose and she has been losing weight.  We started Vimpat at the end of the year.    Summary 2022  RUF [id number], -250, -400, BRV 50-50, CLB 5-10.    Often seems to be sleepy.  She is sometimes on a play mat, she mostly lays there, sometimes she plays with her toys.  She may scream or yell throughout the day.  One time she was able to say her name and clap her hands and laugh at something.  She has moments of increased alertness and awareness.    She continues to have occasional witnessed seizures (body and head rigidity, eyes rolling up, sometimes mild twitching of the arms). The last time a rescue medication was needed was back in September 2021.    We tried to reduce her dose of clobazam to reduce sedation and replace it with lacosamide.    Summary 2023  RUF [id number], -250, -400, BRV 50-50, CLB 10, -150.  She continues to have episodic seizures.  Seizures involved whole body shaking, eyes fixed, drooling.  Her clobazam was increased when there was a cluster of seizures.  She has variable degree of wakefulness, sometimes she is wild and screaming, sometimes she is lethargic.  Most of her seizures are the tonic seizure type.  No report of tonic-clonic  seizures.      Interval History 2024  RUF [id number], -250, -400, JESSICA 50-50, -200, CLB 10 qHS.  She was last seen by Li Vance on 2023 - there were 2 documented seizrues, body tenses up, face read, drooling.  Seizures involve tense body, arms flexed, face red, and labored breathing.    No changes were made.    Since her last visit, she had three seizures in December (12/3 and ).  She was admitted to hospital on  due to dislodged G-tube, she was not able to get her antiseizure medications while she did not have a G-tube, so she had a cluster of 6-7 generalized tonic clonic seizures (upper body stiffening and shaking and unresponsiveness).  She had an EEG study that showed multiple tonic seizures.    [place] -   She had seizures on  needed diazepam to stop seizure.  She had seizure-like activiey on 10/29 2 episodes within 3 minute (lasted 10 seconds, tense and tight, with repetitive shaking of arms and face turning red).    Special Features  Status epilepticus: yes  Self Injury Seizures: No  Precipitating Factors: menstrual cycle??    Epilepsy Risk Factors:  Abnormal pregnancy: unknown  Abnormal birth/: unknown  Abnormal Development: unknown developmental history  Febrile seizures, simple: unknown  Febrile seizures, complex: unknown  CNS infection: No  Mental retardation: Yes  Cerebral palsy: Yes  Head injury (moderate/severe): possibly anoxic brain injury  CNS neoplasm: No  CNS malformation: No  Neurosurgical procedure: No  Stroke: No  Alcohol abuse: No  Drug abuse: No  Family history Sz/epilepsy: unknown    Prior AEDs:  Rufinamide  Clobazam (excessive sedation)  Clonazepam  Levetiracetam (unclear if beneficial)  Lamotrigine (unclear if beneficial)  Topiramate (missed doses caused breakthrough convulsive type of seizures)  Valproate (elevated ammonia levels)  Fycompa (excess sedation)  Felbamate (listed as a drug allergy)  Phenobarbital (contributing to  sedation)  Epidiolex (seems to help the most)  Brivaracetam  Lacosamide    Prior workup:  x    Imaging:  CT head 2/21/2013, 9/7/2018  No acute intracranial pathology    3/20/2019  MRI brain w/wo contrast  Normal MR brain study  Symmetric hippocampal formations    EEGs:  Continuous video EEG monitoring 10/13 - 10/15/2017  Frequent generalized spike/polyspike-slow wave complexes during sleep  At times there are independent right more than left midtemporal spikes and bitemporal spikes    Innumerable generalized tonic seizures during sleep, generalized 1 Hz period discharges, that would become continuous for 30 seconds or generalized rhythmic alpha-beta discharges, associated with patient becoming rigid, tonic posturing with arms elevated and tense with subtle jerking of the upper body, eyes opening with upward deviation.  Ictal patterns:  1 - Seconds of diffuse electrodecrement then paroxysmal fast activity followed by 2Hz spike wave discharges (clinically accompanied by posturing of the arms, then clonic jerking)    Continuous video EEG monitoring 10/18 - 10/25/2017  Diffuse background slowing with bursts of arrhythmic generalized spike wave discharges, with shifting lateralization, and independent left and  right temporal spikes  Mild eyelid myoclonia associated with brief bursts of 2-2.5 Hz generalized discharges  Tonic seizures with eelctrodecrement  There were innumerable tonic seizures; however by 10/25/2017 the frequency of these seizures did decrease in frequency.    Continuous video EEG monitoring 12/1-12/5/2017  There are innumerable tonic seizures that are generally less than one minute in duration (30-40 seconds), these consists of subtle eye opening and tonic stiffening of arms, if longer then whole body stiffening with clonic jerking movements.  These almost always occur exclusively out of sleep.  These consist of 2-2.5 Hz generalized spike-slow wave complexes with generalized attenuation of activity  (desynchronization) or paroxysmal fast activity.  Per 24 hours count of seizures could be .    12/19/2018 routine study  Frequent recurrent tonic seizures associated with paroxysmal generalized fast activity then generalized rhythmic discharges associated with eyes upward deviation, and myoclonic/clonic jerking of the upper body, excess beta activity.    6/28-7/3/2019 continuous video EEG monitoring  Frequent clusters of tonic seizures (body rigidity, head flexions, eyes go up with dysconjugate gaze, and clonic activity of the arms for a few seconds), these are associated with GPFA and rhythmic spike-slow waves.  There are also bilateral temporal focal epileptiform discharges.    12/8/2023  Nine episodes of bursts of high amplitude 15Hz GPFA, lasting 7-10 seconds, correlated with mild partial eye opening.  Background is disorganized, theta-delta slowing and intermixed faster activities.    Labs:  Component      Latest Ref Rng 9/23/2024 10/1/2024   WBC      4.31 - 10.16 Thousand/uL  7.71    RBC      3.81 - 5.12 Million/uL  3.58 (L)    Hemoglobin      11.5 - 15.4 g/dL  11.6    Hematocrit      34.8 - 46.1 %  35.1    Platelet Count      149 - 390 Thousands/uL  211    Sodium      135 - 147 mmol/L  137    Potassium      3.5 - 5.3 mmol/L  3.6    Chloride      96 - 108 mmol/L  109 (H)    Carbon Dioxide      21 - 32 mmol/L  20 (L)    ANION GAP      4 - 13 mmol/L  8    BUN      5 - 25 mg/dL  12    Creatinine      0.60 - 1.30 mg/dL  0.36 (L)    GLUCOSE      65 - 140 mg/dL  141 (H)    Calcium      8.4 - 10.2 mg/dL  8.9    AST      13 - 39 U/L  24    ALT      7 - 52 U/L  35    ALK PHOS      34 - 104 U/L  82    Total Protein      6.4 - 8.4 g/dL  6.8    Albumin      3.5 - 5.0 g/dL  3.8    Total Bilirubin      0.20 - 1.00 mg/dL  0.25    GFR, Calculated      ml/min/1.73sq m  132    Clobazam      30 - 300 ng/mL 57     Desmethylclobazam      300 - 3000 ng/mL 2601     TOPIRAMATE LEVEL      2.0 - 25.0 ug/mL 9.3     Lacosamide       "1.00 - 20.00 ug/mL 5.28     Rufinamide (Banzel)      ug/mL 12.3        Component      Latest Ref Rng 8/6/2024   Lacosamide      1.00 - 20.00 ug/mL 8.89    TOPIRAMATE LEVEL      2.0 - 25.0 ug/mL 14.4    BRIVARACETAM      0.20 - 2.00 ug/mL 1.63    Rufinamide (Banzel)      ug/mL 12.2        General exam   Pulse 79, temperature (!) 96.7 °F (35.9 °C), temperature source Temporal, resp. rate 14, height 5' 1\" (1.549 m), weight 45.8 kg (101 lb), last menstrual period 09/25/2024, SpO2 96%.  Appearance:  facial dysmorphia of intellectual disability, initially asleep, but when her arms were moved she woke up, maintained some degree of eye contact, but she was holding on to her toy  Cardiovascular:  inaudible due to positioning and unable to safely reposition her.  Pulmonary: not assessed due to positioning    Abdomen: nondistended, bowel sounds are present  Extremities: there is no peripheral edema, warm to touch    HEENT:  moist mucus membranes, poor dentition, dysconjugate gaze    Fundoscopy: not assessed    Mental status  Orientation:  awake does not seem to track noise, but will hold onto her toy  Due to her intellectual disability, she is nonverbal so Fund of Knowledge, Attention and Concentration, and Memory cannot be tested  Language:  no vocalizations during this visit, no meaningful language productions    Cranial Nerves  CN 1: not tested  CN 2:  pupils are symmetric, round, and equally reactive to light    CN 3, 4, 6:  no nystagmus is present  CN 5:not assessed  CN 7:muscles of facial expression are symmetric  CN 8: not assessed  CN 9,10: not assessed  CN 11: not assessed  CN 12:not assessed    Motor:  Bulk, Tone: normal bulk, normal tone  Pronation: not assessed  Strength: Unable to assess limb strength by confrontational testing.  She holds her legs flexed at the knees and on her feet on the stretcher with symmetric strength to resist passive extension of the legs  She has resistance to passive extension of her " arms.  Abnormal movements: None    Sensory:  Lighttouch: not assessed    Coordination:  Unable to test    Reflexes:   Reflexes were not assessed    Past Medical/Surgical History:  Patient Active Problem List   Diagnosis    Positive blood culture    Osteoporosis    Onychomycosis    Hyperkeratosis    Cerebral anoxic injury (HCC)    Intractable Lennox-Gastaut syndrome with breakthrough seizures    Somnolence    Low blood pressure    Incontinence    Skin breakdown    MRSA colonization    Right atrial enlargement    Hypophosphatemia    Sedated due to multiple medications    Breast mass    S/P placement of VNS (vagus nerve stimulation) device    Moderate protein-calorie malnutrition (HCC)    Dislodged gastrostomy tube    Fatty infiltration of liver    Intellectual disability with epilepsy (HCC)    Labyrinthine disorder    Labial cyst    PEG tube malfunction (HCC)    Fracture of tooth    Hypotension    Acute cystitis     Past Surgical History:   Procedure Laterality Date    ABDOMINAL SURGERY      CARDIAC PACEMAKER PLACEMENT      vns implant l chest    IR GASTROJEJUNOSTOMY (GJ) TUBE PLACEMENT  12/8/2023    IR GASTROSTOMY (G) TUBE CHECK/CHANGE/REINSERTION/UPSIZE  6/21/2024    IR GASTROSTOMY (G) TUBE CHECK/CHANGE/REINSERTION/UPSIZE  9/26/2024    IR GASTROSTOMY TUBE PLACEMENT  11/21/2019    IR THORACENTESIS  12/17/2018    JEJUNOSTOMY FEEDING TUBE      history of - most recently, PEG tube    PEG TUBE PLACEMENT      [place] INSJ/RPLCMT CRANIAL NEUROSTIM PULSE GENERATOR Left 12/18/2019    Procedure: REPLACEMENT IMPLANTABLE PULSE GENERATOR FOR VAGAL NERVE STIMULATOR, LEFT CHEST;  Surgeon: Patrick Canales MD;  Location: [facility];  Service: Neurosurgery    VAGAL NERVE STIMULATOR (VNS) PLACEMENT Left 8/12/2024    Procedure: Reopening of the left chest incision for placement of implantable pulse generator for vagal nerve stimulator;  Surgeon: Charles Pendleton MD;  Location: [facility];  Service: Neurosurgery     Past Psychiatric  History:  History of behavioral agitation but she is no longer on a one to one because she is generally too sedated.    Medications:    Current Outpatient Medications:     acetaminophen (TYLENOL) 325 mg tablet, Take 650 mg by mouth every 4 (four) hours as needed for mild pain or fever, Disp: , Rfl:     bisacodyl (DULCOLAX) 10 mg suppository, Insert 10 mg into the rectum daily, Disp: , Rfl:     brivaracetam (BRIVIACT) 50 MG TABS tablet, Take 2 tablets (100 mg total) by mouth 2 (two) times a day via PEG tube q12 hours, Disp: , Rfl:     cannabidiol (Epidiolex) 100 mg/ml SOLN, 5 mL (500 mg total) by Per G Tube route 2 (two) times a day, Disp: 300 mL, Rfl: 5    cloBAZam (Onfi) 10 MG tablet, Take 10 mg by mouth 2 (two) times a day, Disp: , Rfl:     diazepam (VALIUM) 5 MG/ML solution, If the patient has a seizure lasting more than 3 minutes then give 1mL by PEG tube, may repeat 1mL if she continues to have seizure for more than 10 minutes., Disp: 30 mL, Rfl: 0    lacosamide (VIMPAT) 200 mg tablet, Give 1 tablet via PEG tube q12 hours, Disp: 60 tablet, Rfl: 5    magnesium hydroxide (MILK OF MAGNESIA) 400 mg/5 mL oral suspension, 30 mL by Per G Tube route daily as needed for constipation, Disp: , Rfl:     Probiotic Product (ALEXANDER-BID PROBIOTIC PO), 1 tablet by Per G Tube route daily, Disp: , Rfl:     rufinamide (BANZEL) 400 mg tablet, 4 tablets (1,600 mg total) by Per G Tube route every 12 (twelve) hours, Disp: 240 tablet, Rfl: 5    topiramate (TOPAMAX) 50 MG tablet, 5 tablets (250 mg total) by Per G Tube route every 12 (twelve) hours, Disp: 300 tablet, Rfl: 5    VITAMIN D PO, Take 1,000 mg by mouth in the morning Given in her G-tube, Disp: , Rfl:     Allergies:  Allergies   Allergen Reactions    Felbamate      Family history:  History reviewed. No pertinent family history.  There is no family history of seizure, epilepsy or developmental delay.      Social History  Living situation:  Resident of [facility] - new owners  now called the [facility]   reports that she has never smoked. She has never used smokeless tobacco. She reports that she does not currently use alcohol. She reports that she does not currently use drugs.     Neurology/Epilepsy Procedure Note  VNS Interrogation    Model: Noris   S/N: [id number]  Date of implant: 12-Aug-2024    Current settings:  Output Current 2.0 mAmp   Signal Frequency 20 Hz   Pulse Width 500 microsec   Signal On Time 30 sec   Signal Off Time 1.1 min     AutoStimulation settings:  AutoStim Output 2.0 mAmp   Pulse Width 500 microsec   AutoStim On Time 30 sec     Magnet settings:  Mag Output 2.25 mAmp   Pulse Width 500 microsec   Mag On Time 60 sec     Tachycardia Detection:  Enabled  Heartbeat verification: not done  Heartbeat Sensitivity:  5  Threshold:  20%    Diagnostics:  Communication OK  Output current 2.0 mAmp  Lead Impedance OK  Impedance value 1847 Ohms  IFI OK  Battery indicator %    Lifetime Magnet activation 5  % stimulation 35%    Decision making was of high-complexity due to the patient's high risk condition (seizures), psychiatric and neuropsychological comorbidities, behavioral problems, memory and cognitive problems and medication side effects.      The total amount of time spent with the patient along with pre-chart and post-chart preparation was 58 minutes on the calendar day of the date of service.  This included history taking, physical exam, review of ancillary testing, counseling provided to the patient regarding diagnosis, medications, treatment, and risk management, and other communication to the patient's providers and/or family.  Start time: 10:45AM  End time: 11:28AM   Additional time for counseling 4:45PM to 5PM

## 2024-10-25 ENCOUNTER — OFFICE VISIT (OUTPATIENT)
Dept: NEUROLOGY | Facility: CLINIC | Age: 44
End: 2024-10-25
Payer: MEDICARE

## 2024-10-25 VITALS
WEIGHT: 101 LBS | BODY MASS INDEX: 19.07 KG/M2 | OXYGEN SATURATION: 96 % | HEIGHT: 61 IN | HEART RATE: 79 BPM | TEMPERATURE: 96.7 F | RESPIRATION RATE: 14 BRPM

## 2024-10-25 DIAGNOSIS — G40.813 INTRACTABLE LENNOX-GASTAUT SYNDROME WITH STATUS EPILEPTICUS (HCC): Primary | ICD-10-CM

## 2024-10-25 DIAGNOSIS — G40.814 INTRACTABLE LENNOX-GASTAUT SYNDROME WITHOUT STATUS EPILEPTICUS (HCC): ICD-10-CM

## 2024-10-25 PROBLEM — I95.9 LOW BLOOD PRESSURE: Status: RESOLVED | Noted: 2018-08-20 | Resolved: 2024-10-25

## 2024-10-25 PROBLEM — N30.00 ACUTE CYSTITIS: Status: RESOLVED | Noted: 2024-09-25 | Resolved: 2024-10-25

## 2024-10-25 PROCEDURE — G2211 COMPLEX E/M VISIT ADD ON: HCPCS | Performed by: PSYCHIATRY & NEUROLOGY

## 2024-10-25 PROCEDURE — 99215 OFFICE O/P EST HI 40 MIN: CPT | Performed by: PSYCHIATRY & NEUROLOGY

## 2024-10-25 RX ORDER — RUFINAMIDE 400 MG/1
1600 TABLET, FILM COATED ORAL EVERY 12 HOURS
Qty: 240 TABLET | Refills: 5 | Status: SHIPPED | OUTPATIENT
Start: 2024-10-25

## 2024-10-25 RX ORDER — LACOSAMIDE 200 MG/1
200 TABLET ORAL EVERY 12 HOURS SCHEDULED
Qty: 60 TABLET | Refills: 5 | Status: SHIPPED | OUTPATIENT
Start: 2024-10-25

## 2024-10-25 RX ORDER — CLOBAZAM 10 MG/1
10 TABLET ORAL 2 TIMES DAILY
Qty: 60 TABLET | Refills: 5 | Status: SHIPPED | OUTPATIENT
Start: 2024-10-25

## 2024-10-25 RX ORDER — BRIVARACETAM 100 MG/1
100 TABLET, FILM COATED ORAL 2 TIMES DAILY
Qty: 60 TABLET | Refills: 5 | Status: SHIPPED | OUTPATIENT
Start: 2024-10-25

## 2024-10-25 RX ORDER — TOPIRAMATE 50 MG/1
250 TABLET, FILM COATED ORAL EVERY 12 HOURS SCHEDULED
Qty: 300 TABLET | Refills: 5 | Status: SHIPPED | OUTPATIENT
Start: 2024-10-25

## 2024-10-25 NOTE — PATIENT INSTRUCTIONS
Plan:   Medications are given by PEG tube  1 - Continue Epidiolex 100mg/mL 5.0 mL (500mg) every 12 hours  2 - Continue Briviact 100 mg tab one tabe every 12 hours  3 - Continue with Topiramate 50mg tabs give 5 tabs (250mg) every 12 hours  4 - continue Banzel 400mg give 4 tabs every 12 hours  5 - Continue Clobazam 10mg tab give one tab every 12 hours  6 - Continue Lacosamide 200mg tab one tab every 12 hours.  7 - give diazepam 5mg/mL 1 mL as needed for seizure last more than 3 minutes and repeat second dose for seizure lasting more than 10 minutes  8 - Call the office if there is increase in seizure frequency  9 - if there are more seizures; then will need to consider replacing lacosamide with Fintepla (this medication requires every 6 months echocardiogram to evaluate for valvular dysfunction and pulmonary hypertension.  10 - follow-up in 3 months with advanced practitioner

## 2024-10-27 NOTE — ASSESSMENT & PLAN NOTE
Medications are given by PEG tube  1 - Continue Epidiolex 100mg/mL 5.0 mL (500mg) every 12 hours  2 - Continue Briviact 100 mg tab one tabe every 12 hours  3 - Continue with Topiramate 50mg tabs give 5 tabs (250mg) every 12 hours  4 - continue Banzel 400mg give 4 tabs every 12 hours  5 - Continue Clobazam 10mg tab give one tab every 12 hours  6 - Continue Lacosamide 200mg tab one tab every 12 hours.  7 - give diazepam 5mg/mL 1 mL as needed for seizure last more than 3 minutes and repeat second dose for seizure lasting more than 10 minutes  8 - Call the office if there is increase in seizure frequency  9 - if there are more seizures; then will need to consider replacing lacosamide with Fintepla (this medication requires every 6 months echocardiogram to evaluate for valvular dysfunction and pulmonary hypertension.

## 2024-10-31 ENCOUNTER — TELEPHONE (OUTPATIENT)
Age: 44
End: 2024-10-31

## 2024-10-31 NOTE — TELEPHONE ENCOUNTER
Received a call from Subha with [facility]. She stated the pharmacy received a script for Epidiolex. They attempted to process the script and saw that it was just refilled by [facility]. This is the pharmacy that delivers to [facility]s nursing facility. Subha stated that they are going to cancel this script as the medication is being filled by [facility].

## 2024-11-05 ENCOUNTER — HOSPITAL ENCOUNTER (INPATIENT)
Facility: HOSPITAL | Age: 44
LOS: 3 days | Discharge: NURSING, REHAB OR LONG-TERM CARE | DRG: 871 | End: 2024-11-09
Attending: EMERGENCY MEDICINE | Admitting: FAMILY MEDICINE
Payer: MEDICARE

## 2024-11-05 ENCOUNTER — APPOINTMENT (EMERGENCY)
Dept: CT IMAGING | Facility: HOSPITAL | Age: 44
DRG: 871 | End: 2024-11-05
Payer: MEDICARE

## 2024-11-05 ENCOUNTER — APPOINTMENT (EMERGENCY)
Dept: RADIOLOGY | Facility: HOSPITAL | Age: 44
DRG: 871 | End: 2024-11-05
Payer: MEDICARE

## 2024-11-05 DIAGNOSIS — J18.9 MULTIFOCAL PNEUMONIA: Primary | ICD-10-CM

## 2024-11-05 DIAGNOSIS — D72.829 LEUKOCYTOSIS: ICD-10-CM

## 2024-11-05 DIAGNOSIS — A41.9 SEPSIS (HCC): ICD-10-CM

## 2024-11-05 LAB
ALBUMIN SERPL BCG-MCNC: 4.5 G/DL (ref 3.5–5)
ALP SERPL-CCNC: 102 U/L (ref 34–104)
ALT SERPL W P-5'-P-CCNC: 37 U/L (ref 7–52)
ANION GAP SERPL CALCULATED.3IONS-SCNC: 12 MMOL/L (ref 4–13)
APTT PPP: 25 SECONDS (ref 23–34)
AST SERPL W P-5'-P-CCNC: 22 U/L (ref 13–39)
BASOPHILS # BLD AUTO: 0.05 THOUSANDS/ÂΜL (ref 0–0.1)
BASOPHILS NFR BLD AUTO: 0 % (ref 0–1)
BILIRUB SERPL-MCNC: 0.63 MG/DL (ref 0.2–1)
BUN SERPL-MCNC: 17 MG/DL (ref 5–25)
CALCIUM SERPL-MCNC: 10.1 MG/DL (ref 8.4–10.2)
CHLORIDE SERPL-SCNC: 107 MMOL/L (ref 96–108)
CO2 SERPL-SCNC: 21 MMOL/L (ref 21–32)
CREAT SERPL-MCNC: 0.54 MG/DL (ref 0.6–1.3)
EOSINOPHIL # BLD AUTO: 0.03 THOUSAND/ÂΜL (ref 0–0.61)
EOSINOPHIL NFR BLD AUTO: 0 % (ref 0–6)
ERYTHROCYTE [DISTWIDTH] IN BLOOD BY AUTOMATED COUNT: 12.7 % (ref 11.6–15.1)
FLUAV AG UPPER RESP QL IA.RAPID: NEGATIVE
FLUBV AG UPPER RESP QL IA.RAPID: NEGATIVE
GFR SERPL CREATININE-BSD FRML MDRD: 115 ML/MIN/1.73SQ M
GLUCOSE SERPL-MCNC: 119 MG/DL (ref 65–140)
HCG SERPL QL: NEGATIVE
HCT VFR BLD AUTO: 42.6 % (ref 34.8–46.1)
HGB BLD-MCNC: 13.7 G/DL (ref 11.5–15.4)
IMM GRANULOCYTES # BLD AUTO: 0.07 THOUSAND/UL (ref 0–0.2)
IMM GRANULOCYTES NFR BLD AUTO: 0 % (ref 0–2)
INR PPP: 1.02 (ref 0.85–1.19)
LACTATE SERPL-SCNC: 1 MMOL/L (ref 0.5–2)
LYMPHOCYTES # BLD AUTO: 1.34 THOUSANDS/ÂΜL (ref 0.6–4.47)
LYMPHOCYTES NFR BLD AUTO: 8 % (ref 14–44)
MCH RBC QN AUTO: 31.7 PG (ref 26.8–34.3)
MCHC RBC AUTO-ENTMCNC: 32.2 G/DL (ref 31.4–37.4)
MCV RBC AUTO: 99 FL (ref 82–98)
MONOCYTES # BLD AUTO: 1.29 THOUSAND/ÂΜL (ref 0.17–1.22)
MONOCYTES NFR BLD AUTO: 7 % (ref 4–12)
NEUTROPHILS # BLD AUTO: 14.85 THOUSANDS/ÂΜL (ref 1.85–7.62)
NEUTS SEG NFR BLD AUTO: 85 % (ref 43–75)
NRBC BLD AUTO-RTO: 0 /100 WBCS
PLATELET # BLD AUTO: 229 THOUSANDS/UL (ref 149–390)
PMV BLD AUTO: 11.5 FL (ref 8.9–12.7)
POTASSIUM SERPL-SCNC: 3.5 MMOL/L (ref 3.5–5.3)
PROCALCITONIN SERPL-MCNC: 0.13 NG/ML
PROT SERPL-MCNC: 8 G/DL (ref 6.4–8.4)
PROTHROMBIN TIME: 13.9 SECONDS (ref 12.3–15)
RBC # BLD AUTO: 4.32 MILLION/UL (ref 3.81–5.12)
SARS-COV+SARS-COV-2 AG RESP QL IA.RAPID: NEGATIVE
SODIUM SERPL-SCNC: 140 MMOL/L (ref 135–147)
WBC # BLD AUTO: 17.63 THOUSAND/UL (ref 4.31–10.16)

## 2024-11-05 PROCEDURE — 96365 THER/PROPH/DIAG IV INF INIT: CPT

## 2024-11-05 PROCEDURE — 87040 BLOOD CULTURE FOR BACTERIA: CPT | Performed by: EMERGENCY MEDICINE

## 2024-11-05 PROCEDURE — 80053 COMPREHEN METABOLIC PANEL: CPT | Performed by: EMERGENCY MEDICINE

## 2024-11-05 PROCEDURE — 84145 PROCALCITONIN (PCT): CPT | Performed by: EMERGENCY MEDICINE

## 2024-11-05 PROCEDURE — 71260 CT THORAX DX C+: CPT

## 2024-11-05 PROCEDURE — 85610 PROTHROMBIN TIME: CPT | Performed by: EMERGENCY MEDICINE

## 2024-11-05 PROCEDURE — 74177 CT ABD & PELVIS W/CONTRAST: CPT

## 2024-11-05 PROCEDURE — 99285 EMERGENCY DEPT VISIT HI MDM: CPT | Performed by: EMERGENCY MEDICINE

## 2024-11-05 PROCEDURE — 85025 COMPLETE CBC W/AUTO DIFF WBC: CPT | Performed by: EMERGENCY MEDICINE

## 2024-11-05 PROCEDURE — 99285 EMERGENCY DEPT VISIT HI MDM: CPT

## 2024-11-05 PROCEDURE — 87811 SARS-COV-2 COVID19 W/OPTIC: CPT | Performed by: EMERGENCY MEDICINE

## 2024-11-05 PROCEDURE — 83605 ASSAY OF LACTIC ACID: CPT | Performed by: EMERGENCY MEDICINE

## 2024-11-05 PROCEDURE — 84703 CHORIONIC GONADOTROPIN ASSAY: CPT | Performed by: EMERGENCY MEDICINE

## 2024-11-05 PROCEDURE — 87804 INFLUENZA ASSAY W/OPTIC: CPT | Performed by: EMERGENCY MEDICINE

## 2024-11-05 PROCEDURE — 85730 THROMBOPLASTIN TIME PARTIAL: CPT | Performed by: EMERGENCY MEDICINE

## 2024-11-05 PROCEDURE — 36415 COLL VENOUS BLD VENIPUNCTURE: CPT | Performed by: EMERGENCY MEDICINE

## 2024-11-05 PROCEDURE — 71045 X-RAY EXAM CHEST 1 VIEW: CPT

## 2024-11-05 PROCEDURE — 93005 ELECTROCARDIOGRAM TRACING: CPT

## 2024-11-05 RX ORDER — ACETAMINOPHEN 10 MG/ML
1000 INJECTION, SOLUTION INTRAVENOUS ONCE
Status: COMPLETED | OUTPATIENT
Start: 2024-11-05 | End: 2024-11-06

## 2024-11-05 RX ORDER — CEFTRIAXONE 1 G/50ML
1000 INJECTION, SOLUTION INTRAVENOUS EVERY 24 HOURS
Status: DISCONTINUED | OUTPATIENT
Start: 2024-11-05 | End: 2024-11-09 | Stop reason: HOSPADM

## 2024-11-05 RX ADMIN — ACETAMINOPHEN 1000 MG: 10 INJECTION, SOLUTION INTRAVENOUS at 23:02

## 2024-11-05 RX ADMIN — IOHEXOL 85 ML: 350 INJECTION, SOLUTION INTRAVENOUS at 23:53

## 2024-11-06 PROBLEM — G40.812 NONINTRACTABLE LENNOX-GASTAUT SYNDROME WITHOUT STATUS EPILEPTICUS (HCC): Status: ACTIVE | Noted: 2018-04-23

## 2024-11-06 PROBLEM — E44.0 MODERATE PROTEIN-CALORIE MALNUTRITION (HCC): Status: RESOLVED | Noted: 2019-07-31 | Resolved: 2024-11-06

## 2024-11-06 LAB
ALBUMIN SERPL BCG-MCNC: 4 G/DL (ref 3.5–5)
ALP SERPL-CCNC: 83 U/L (ref 34–104)
ALT SERPL W P-5'-P-CCNC: 29 U/L (ref 7–52)
ANION GAP SERPL CALCULATED.3IONS-SCNC: 9 MMOL/L (ref 4–13)
AST SERPL W P-5'-P-CCNC: 17 U/L (ref 13–39)
BACTERIA UR QL AUTO: NORMAL /HPF
BILIRUB SERPL-MCNC: 0.5 MG/DL (ref 0.2–1)
BILIRUB UR QL STRIP: NEGATIVE
BUN SERPL-MCNC: 16 MG/DL (ref 5–25)
CA-I BLD-SCNC: 1.27 MMOL/L (ref 1.12–1.32)
CALCIUM SERPL-MCNC: 9.1 MG/DL (ref 8.4–10.2)
CHLORIDE SERPL-SCNC: 109 MMOL/L (ref 96–108)
CLARITY UR: ABNORMAL
CO2 SERPL-SCNC: 22 MMOL/L (ref 21–32)
COLOR UR: ABNORMAL
CREAT SERPL-MCNC: 0.52 MG/DL (ref 0.6–1.3)
ERYTHROCYTE [DISTWIDTH] IN BLOOD BY AUTOMATED COUNT: 12.8 % (ref 11.6–15.1)
GFR SERPL CREATININE-BSD FRML MDRD: 117 ML/MIN/1.73SQ M
GLUCOSE SERPL-MCNC: 134 MG/DL (ref 65–140)
GLUCOSE UR STRIP-MCNC: NEGATIVE MG/DL
HCT VFR BLD AUTO: 35 % (ref 34.8–46.1)
HGB BLD-MCNC: 11.4 G/DL (ref 11.5–15.4)
HGB UR QL STRIP.AUTO: NEGATIVE
KETONES UR STRIP-MCNC: NEGATIVE MG/DL
LEUKOCYTE ESTERASE UR QL STRIP: NEGATIVE
MAGNESIUM SERPL-MCNC: 2 MG/DL (ref 1.9–2.7)
MCH RBC QN AUTO: 32 PG (ref 26.8–34.3)
MCHC RBC AUTO-ENTMCNC: 32.6 G/DL (ref 31.4–37.4)
MCV RBC AUTO: 98 FL (ref 82–98)
NITRITE UR QL STRIP: NEGATIVE
NON-SQ EPI CELLS URNS QL MICRO: NORMAL /HPF
PH UR STRIP.AUTO: 8 [PH]
PHOSPHATE SERPL-MCNC: 3 MG/DL (ref 2.7–4.5)
PLATELET # BLD AUTO: 200 THOUSANDS/UL (ref 149–390)
PMV BLD AUTO: 11.5 FL (ref 8.9–12.7)
POTASSIUM SERPL-SCNC: 3.2 MMOL/L (ref 3.5–5.3)
PROT SERPL-MCNC: 7.1 G/DL (ref 6.4–8.4)
PROT UR STRIP-MCNC: ABNORMAL MG/DL
RBC # BLD AUTO: 3.56 MILLION/UL (ref 3.81–5.12)
RBC #/AREA URNS AUTO: NORMAL /HPF
SODIUM SERPL-SCNC: 140 MMOL/L (ref 135–147)
SP GR UR STRIP.AUTO: <1.005 (ref 1–1.03)
UROBILINOGEN UR STRIP-ACNC: <2 MG/DL
WBC # BLD AUTO: 17.92 THOUSAND/UL (ref 4.31–10.16)
WBC #/AREA URNS AUTO: NORMAL /HPF

## 2024-11-06 PROCEDURE — 87081 CULTURE SCREEN ONLY: CPT | Performed by: FAMILY MEDICINE

## 2024-11-06 PROCEDURE — 81001 URINALYSIS AUTO W/SCOPE: CPT | Performed by: EMERGENCY MEDICINE

## 2024-11-06 PROCEDURE — 96367 TX/PROPH/DG ADDL SEQ IV INF: CPT

## 2024-11-06 PROCEDURE — 94760 N-INVAS EAR/PLS OXIMETRY 1: CPT

## 2024-11-06 PROCEDURE — 85027 COMPLETE CBC AUTOMATED: CPT | Performed by: FAMILY MEDICINE

## 2024-11-06 PROCEDURE — 99222 1ST HOSP IP/OBS MODERATE 55: CPT | Performed by: FAMILY MEDICINE

## 2024-11-06 PROCEDURE — 80053 COMPREHEN METABOLIC PANEL: CPT | Performed by: FAMILY MEDICINE

## 2024-11-06 PROCEDURE — 36415 COLL VENOUS BLD VENIPUNCTURE: CPT | Performed by: EMERGENCY MEDICINE

## 2024-11-06 PROCEDURE — 94664 DEMO&/EVAL PT USE INHALER: CPT

## 2024-11-06 PROCEDURE — 82330 ASSAY OF CALCIUM: CPT | Performed by: FAMILY MEDICINE

## 2024-11-06 PROCEDURE — 96366 THER/PROPH/DIAG IV INF ADDON: CPT

## 2024-11-06 PROCEDURE — 84100 ASSAY OF PHOSPHORUS: CPT | Performed by: FAMILY MEDICINE

## 2024-11-06 PROCEDURE — 83735 ASSAY OF MAGNESIUM: CPT | Performed by: FAMILY MEDICINE

## 2024-11-06 PROCEDURE — 87040 BLOOD CULTURE FOR BACTERIA: CPT | Performed by: EMERGENCY MEDICINE

## 2024-11-06 RX ORDER — ACETAMINOPHEN 325 MG/1
650 TABLET ORAL EVERY 4 HOURS PRN
Status: DISCONTINUED | OUTPATIENT
Start: 2024-11-06 | End: 2024-11-09 | Stop reason: HOSPADM

## 2024-11-06 RX ORDER — DIAZEPAM 2 MG/1
2 TABLET ORAL EVERY 6 HOURS PRN
Status: DISCONTINUED | OUTPATIENT
Start: 2024-11-06 | End: 2024-11-09 | Stop reason: HOSPADM

## 2024-11-06 RX ORDER — POTASSIUM CHLORIDE 20MEQ/15ML
40 LIQUID (ML) ORAL ONCE
Status: COMPLETED | OUTPATIENT
Start: 2024-11-06 | End: 2024-11-06

## 2024-11-06 RX ORDER — RUFINAMIDE 40 MG/ML
1600 SUSPENSION ORAL EVERY 12 HOURS SCHEDULED
Status: DISCONTINUED | OUTPATIENT
Start: 2024-11-06 | End: 2024-11-09 | Stop reason: HOSPADM

## 2024-11-06 RX ORDER — LACOSAMIDE 50 MG/1
200 TABLET ORAL EVERY 12 HOURS SCHEDULED
Status: DISCONTINUED | OUTPATIENT
Start: 2024-11-06 | End: 2024-11-09 | Stop reason: HOSPADM

## 2024-11-06 RX ORDER — BISACODYL 10 MG
10 SUPPOSITORY, RECTAL RECTAL DAILY
Status: DISCONTINUED | OUTPATIENT
Start: 2024-11-06 | End: 2024-11-09 | Stop reason: HOSPADM

## 2024-11-06 RX ORDER — ENOXAPARIN SODIUM 100 MG/ML
40 INJECTION SUBCUTANEOUS DAILY
Status: DISCONTINUED | OUTPATIENT
Start: 2024-11-06 | End: 2024-11-09 | Stop reason: HOSPADM

## 2024-11-06 RX ORDER — CLOBAZAM 10 MG/1
10 TABLET ORAL EVERY 12 HOURS SCHEDULED
Status: DISCONTINUED | OUTPATIENT
Start: 2024-11-06 | End: 2024-11-09 | Stop reason: HOSPADM

## 2024-11-06 RX ORDER — RUFINAMIDE 200 MG/1
1600 TABLET, FILM COATED ORAL EVERY 12 HOURS
Status: DISCONTINUED | OUTPATIENT
Start: 2024-11-06 | End: 2024-11-06

## 2024-11-06 RX ORDER — GUAIFENESIN 100 MG/5ML
200 SOLUTION ORAL EVERY 4 HOURS PRN
Status: DISCONTINUED | OUTPATIENT
Start: 2024-11-06 | End: 2024-11-09 | Stop reason: HOSPADM

## 2024-11-06 RX ORDER — SODIUM CHLORIDE 9 MG/ML
75 INJECTION, SOLUTION INTRAVENOUS CONTINUOUS
Status: DISCONTINUED | OUTPATIENT
Start: 2024-11-06 | End: 2024-11-07

## 2024-11-06 RX ADMIN — BRIVARACETAM 100 MG: 10 SOLUTION ORAL at 17:26

## 2024-11-06 RX ADMIN — CEFTRIAXONE 1000 MG: 1 INJECTION, SOLUTION INTRAVENOUS at 23:24

## 2024-11-06 RX ADMIN — LACOSAMIDE 200 MG: 50 TABLET, FILM COATED ORAL at 08:23

## 2024-11-06 RX ADMIN — SODIUM CHLORIDE 75 ML/HR: 0.9 INJECTION, SOLUTION INTRAVENOUS at 17:45

## 2024-11-06 RX ADMIN — BISACODYL 10 MG: 10 SUPPOSITORY RECTAL at 08:23

## 2024-11-06 RX ADMIN — AZITHROMYCIN MONOHYDRATE 500 MG: 500 INJECTION, POWDER, LYOPHILIZED, FOR SOLUTION INTRAVENOUS at 04:17

## 2024-11-06 RX ADMIN — CLOBAZAM 10 MG: 10 TABLET ORAL at 20:49

## 2024-11-06 RX ADMIN — CEFTRIAXONE 1000 MG: 1 INJECTION, SOLUTION INTRAVENOUS at 00:35

## 2024-11-06 RX ADMIN — TOPIRAMATE 250 MG: 100 TABLET, FILM COATED ORAL at 20:48

## 2024-11-06 RX ADMIN — CLOBAZAM 10 MG: 10 TABLET ORAL at 08:23

## 2024-11-06 RX ADMIN — SODIUM CHLORIDE 75 ML/HR: 0.9 INJECTION, SOLUTION INTRAVENOUS at 03:27

## 2024-11-06 RX ADMIN — RUFINAMIDE 1600 MG: 40 SUSPENSION ORAL at 20:49

## 2024-11-06 RX ADMIN — RUFINAMIDE 1600 MG: 40 SUSPENSION ORAL at 08:25

## 2024-11-06 RX ADMIN — POTASSIUM CHLORIDE 40 MEQ: 1.5 SOLUTION ORAL at 08:23

## 2024-11-06 RX ADMIN — BRIVARACETAM 100 MG: 10 SOLUTION ORAL at 08:25

## 2024-11-06 RX ADMIN — TOPIRAMATE 250 MG: 100 TABLET, FILM COATED ORAL at 08:23

## 2024-11-06 RX ADMIN — LACOSAMIDE 200 MG: 50 TABLET, FILM COATED ORAL at 20:49

## 2024-11-06 RX ADMIN — ENOXAPARIN SODIUM 40 MG: 40 INJECTION SUBCUTANEOUS at 08:23

## 2024-11-06 NOTE — ED PROVIDER NOTES
Time reflects when diagnosis was documented in both MDM as applicable and the Disposition within this note       Time User Action Codes Description Comment    11/6/2024  2:39 AM John Alejandro [J18.9] Multifocal pneumonia     11/6/2024  2:39 AM John Alejandro [A41.9] Sepsis (HCC)     11/6/2024  2:40 AM John Alejandro [D72.829] Leukocytosis           ED Disposition       ED Disposition   Admit    Condition   Stable    Date/Time   Wed Nov 6, 2024  2:38 AM    Comment   Case was discussed with AURELIOIM and the patient's admission status was agreed to be Admission Status: inpatient status to the service of Dr. Soto .               Assessment & Plan       Medical Decision Making  I reviewed the patient's medical chart, PMHx, prior encounters, medications.    My independent interpretation of ECG demonstrated: Sinus tachycardia, rightward axis  My independent interpretation of CXR demonstrated: Evidence of RLL pneumonia    My DDx includes: G-tube complication, viral syndrome, dehydration, electrolyte abnormlity. At risk for UTI, pneumonia, abdominal infection.    Will order broad labs. Given febrile nature and initial concern for G-tube compliction, will perform CT abdomen pelvis to rule out infection or perforation prior to exchange.    Patient's UA was negative.  Given positive x-ray, CT scan chest and pelvis was performed for further delineation    Patient's Luer-Jayme port was replaced on G-tube, does not require further management at this time.  Does not require replacement.    Patient was started on ceftriaxone, admitted to [facility] service for multifocal pneumonia.    Amount and/or Complexity of Data Reviewed  Labs: ordered. Decision-making details documented in ED Course.  Radiology: ordered and independent interpretation performed.    Risk  Prescription drug management.  Decision regarding hospitalization.        ED Course as of 11/06/24 0610   Tue Nov 05, 2024   2307 WBC(!):  17.63  Leukocytosis   2320 FLU/COVID Rapid Antigen (30 min. TAT) - Preferred screening test in ED  Viral testing negative.   Wed Nov 06, 2024   0213 Patchy ground-glass opacity and consolidation in the right lower lobe with additional subtle  infiltrate in the right apex suggests developing multifocal pneumonia.         Medications   cefTRIAXone (ROCEPHIN) IVPB (premix in dextrose) 1,000 mg 50 mL (0 mg Intravenous Stopped 11/6/24 0116)   acetaminophen (Ofirmev) injection 1,000 mg (0 mg Intravenous Stopped 11/6/24 0045)   iohexol (OMNIPAQUE) 350 MG/ML injection (MULTI-DOSE) 85 mL (85 mL Intravenous Given 11/5/24 2353)       ED Risk Strat Scores                           SBIRT 22yo+      Flowsheet Row Most Recent Value   Initial Alcohol Screen: US AUDIT-C     1. How often do you have a drink containing alcohol? 0 Filed at: 11/05/2024 2210   2. How many drinks containing alcohol do you have on a typical day you are drinking?  0 Filed at: 11/05/2024 2210   3a. Male UNDER 65: How often do you have five or more drinks on one occasion? 0 Filed at: 11/05/2024 2210   3b. FEMALE Any Age, or MALE 65+: How often do you have 4 or more drinks on one occassion? 0 Filed at: 11/05/2024 2210   Audit-C Score 0 Filed at: 11/05/2024 2210   SREE: How many times in the past year have you...    Used an illegal drug or used a prescription medication for non-medical reasons? Never Filed at: 11/05/2024 2210                            History of Present Illness       Chief Complaint   Patient presents with    Medical Problem     Facility states they're having a problem with the feeding tube       Past Medical History:   Diagnosis Date    ADHD     Anoxic brain damage (HCC)     Autistic disorder     Breakthrough seizure (HCC) 8/1/2019    Dysphagia     Dysphagia, oropharyngeal phase     Gastrostomy tube dependent (HCC)     14 Fr as of 05/19/2020    Hyperammonemia (HCC)     Hyperkeratosis     Hypotension     Intellectual disability      Lennox-Gastaut syndrome with tonic seizures (HCC)     Lethargy     Liver enzyme elevation     Onychomycosis     Osteoporosis     Osteoporosis       Past Surgical History:   Procedure Laterality Date    ABDOMINAL SURGERY      CARDIAC PACEMAKER PLACEMENT      vns implant l chest    IR GASTROJEJUNOSTOMY (GJ) TUBE PLACEMENT  12/8/2023    IR GASTROSTOMY (G) TUBE CHECK/CHANGE/REINSERTION/UPSIZE  6/21/2024    IR GASTROSTOMY (G) TUBE CHECK/CHANGE/REINSERTION/UPSIZE  9/26/2024    IR GASTROSTOMY TUBE PLACEMENT  11/21/2019    IR THORACENTESIS  12/17/2018    JEJUNOSTOMY FEEDING TUBE      history of - most recently, PEG tube    PEG TUBE PLACEMENT      MD INSJ/RPLCMT CRANIAL NEUROSTIM PULSE GENERATOR Left 12/18/2019    Procedure: REPLACEMENT IMPLANTABLE PULSE GENERATOR FOR VAGAL NERVE STIMULATOR, LEFT CHEST;  Surgeon: Patrick Canales MD;  Location: [facility];  Service: Neurosurgery    VAGAL NERVE STIMULATOR (VNS) PLACEMENT Left 8/12/2024    Procedure: Reopening of the left chest incision for placement of implantable pulse generator for vagal nerve stimulator;  Surgeon: Charles Pendleton MD;  Location: [facility];  Service: Neurosurgery      History reviewed. No pertinent family history.   Social History     Tobacco Use    Smoking status: Never     Passive exposure: Never    Smokeless tobacco: Never   Vaping Use    Vaping status: Never Used   Substance Use Topics    Alcohol use: Never    Drug use: Never      E-Cigarette/Vaping    E-Cigarette Use Never User       E-Cigarette/Vaping Substances    Nicotine No     THC No     CBD No     Flavoring No     Other No     Unknown No       I have reviewed and agree with the history as documented.     44-year-old female, nonverbal at baseline, chronic G-tube, who presents for G-tube complication.  Patient is unable to provide history.  Per nursing home, she had a complication with her G-tube, they were having difficulty using the Luer-Jayme.  However, here she is febrile at 101.2 incidentally, mildly  tachycardic.  Normotensive.        Review of Systems   Unable to perform ROS: Patient nonverbal           Objective       ED Triage Vitals   Temperature Pulse Blood Pressure Respirations SpO2 Patient Position - Orthostatic VS   11/05/24 2206 11/05/24 2206 11/05/24 2216 11/05/24 2206 11/05/24 2206 11/05/24 2300   (!) 101.2 °F (38.4 °C) 97 112/62 16 96 % Lying      Temp Source Heart Rate Source BP Location FiO2 (%) Pain Score    11/05/24 2206 11/05/24 2206 11/05/24 2300 -- --    Temporal Monitor Left arm        Vitals      Date and Time Temp Pulse SpO2 Resp BP Pain Score FACES Pain Rating User   11/06/24 0500 -- 88 94 % -- -- -- -- CG   11/06/24 0340 -- -- -- 17 -- -- -- FMC   11/06/24 0340 97.4 °F (36.3 °C) 81 99 % -- 99/54 -- -- DII   11/06/24 0338 97.4 °F (36.3 °C) -- -- 17 99/54 -- -- DII   11/06/24 0318 -- -- 99 % -- -- -- -- TH   11/06/24 0230 -- 78 96 % 20 91/53 -- -- CS   11/06/24 0139 99.3 °F (37.4 °C) 84 97 % 16 97/57 -- -- CS   11/05/24 2300 -- 104 96 % -- 103/64 -- -- CS   11/05/24 2216 -- -- -- -- 112/62 -- -- CK   11/05/24 2206 101.2 °F (38.4 °C) 97 96 % 16 -- -- -- CK            Physical Exam  Constitutional:       Appearance: She is well-developed.   HENT:      Head: Normocephalic and atraumatic.   Cardiovascular:      Rate and Rhythm: Normal rate and regular rhythm.      Heart sounds: Normal heart sounds. No murmur heard.     No friction rub.   Pulmonary:      Effort: Pulmonary effort is normal. No respiratory distress.      Breath sounds: Normal breath sounds. No wheezing or rales.   Abdominal:      General: Bowel sounds are normal. There is no distension.      Palpations: Abdomen is soft.      Tenderness: There is no abdominal tenderness.      Comments: G-tube appears placed, no evidence of infection over the G-tube site.   Musculoskeletal:         General: Normal range of motion.      Cervical back: Normal range of motion and neck supple.   Skin:     General: Skin is warm.      Comments: Skin was  thoroughly evaluated, no evidence of infection throughout the  region as well as anywhere else   Neurological:      Mental Status: She is alert and oriented to person, place, and time.      Coordination: Coordination normal.   Psychiatric:         Behavior: Behavior normal.         Thought Content: Thought content normal.         Judgment: Judgment normal.         Results Reviewed       Procedure Component Value Units Date/Time    Comprehensive metabolic panel [[id number]]     Lab Status: No result Specimen: Blood     CBC and Platelet [[id number]]     Lab Status: No result Specimen: Blood     Magnesium [[id number]]     Lab Status: No result Specimen: Blood     Phosphorus [[id number]]     Lab Status: No result Specimen: Blood     Calcium, ionized [[id number]]     Lab Status: No result Specimen: Blood     Sputum culture and Gram stain [[id number]]     Lab Status: No result Specimen: Sputum     Urine Microscopic [[id number]]  (Normal) Collected: 11/06/24 0034    Lab Status: Final result Specimen: Urine, Straight Cath Updated: 11/06/24 0054     RBC, UA 1-2 /hpf      WBC, UA 1-2 /hpf      Epithelial Cells Occasional /hpf      Bacteria, UA Occasional /hpf     UA w Reflex to Microscopic w Reflex to Culture [[id number]]  (Abnormal) Collected: 11/06/24 0034    Lab Status: Final result Specimen: Urine, Straight Cath Updated: 11/06/24 0045     Color, UA Light Yellow     Clarity, UA Slightly Cloudy     Specific Gravity, UA <1.005     pH, UA 8.0     Leukocytes, UA Negative     Nitrite, UA Negative     Protein, UA 30 (1+) mg/dl      Glucose, UA Negative mg/dl      Ketones, UA Negative mg/dl      Urobilinogen, UA <2.0 mg/dl      Bilirubin, UA Negative     Occult Blood, UA Negative    Blood culture #1 [[id number]] Collected: 11/06/24 0034    Lab Status: In process Specimen: Blood from Hand, Right Updated: 11/06/24 0041    Procalcitonin [[id number]]  (Normal) Collected: 11/05/24 2248    Lab Status: Final result Specimen: Blood from Arm,  Right Updated: 11/05/24 2325     Procalcitonin 0.13 ng/ml     hCG, qualitative pregnancy [[id number]]  (Normal) Collected: 11/05/24 2254    Lab Status: Final result Specimen: Blood from Arm, Right Updated: 11/05/24 2324     Preg, Serum Negative    FLU/COVID Rapid Antigen (30 min. TAT) - Preferred screening test in ED [[id number]]  (Normal) Collected: 11/05/24 2248    Lab Status: Final result Specimen: Nares from Nose Updated: 11/05/24 2319     SARS COV Rapid Antigen Negative     Influenza A Rapid Antigen Negative     Influenza B Rapid Antigen Negative    Narrative:      This test has been performed using the [employer] Velvet 2 FLU+SARS Antigen test under the Emergency Use Authorization (EUA). This test has been validated by the  and verified by the performing laboratory. The Velvet uses lateral flow immunofluorescent sandwich assay to detect SARS-COV, Influenza A and Influenza B Antigen.     The [employer] Velvet 2 SARS Antigen test does not differentiate between SARS-CoV and SARS-CoV-2.     Negative results are presumptive and may be confirmed with a molecular assay, if necessary, for patient management. Negative results do not rule out SARS-CoV-2 or influenza infection and should not be used as the sole basis for treatment or patient management decisions. A negative test result may occur if the level of antigen in a sample is below the limit of detection of this test.     Positive results are indicative of the presence of viral antigens, but do not rule out bacterial infection or co-infection with other viruses.     All test results should be used as an adjunct to clinical observations and other information available to the provider.    FOR PEDIATRIC PATIENTS - copy/paste COVID Guidelines URL to browser: [email or web address]    Lactic acid [[id number]]  (Normal) Collected: 11/05/24 2248    Lab Status: Final result Specimen: Blood from Arm, Right Updated:  11/05/24 2316     LACTIC ACID 1.0 mmol/L     Narrative:      Result may be elevated if tourniquet was used during collection.    Comprehensive metabolic panel [[id number]]  (Abnormal) Collected: 11/05/24 2248    Lab Status: Final result Specimen: Blood from Arm, Right Updated: 11/05/24 2316     Sodium 140 mmol/L      Potassium 3.5 mmol/L      Chloride 107 mmol/L      CO2 21 mmol/L      ANION GAP 12 mmol/L      BUN 17 mg/dL      Creatinine 0.54 mg/dL      Glucose 119 mg/dL      Calcium 10.1 mg/dL      AST 22 U/L      ALT 37 U/L      Alkaline Phosphatase 102 U/L      Total Protein 8.0 g/dL      Albumin 4.5 g/dL      Total Bilirubin 0.63 mg/dL      eGFR 115 ml/min/1.73sq m     Narrative:      National Kid[employer] Disease Foundation guidelines for Chronic Kidney Disease (CKD):     Stage 1 with normal or high GFR (GFR > 90 mL/min/1.73 square meters)    Stage 2 Mild CKD (GFR = 60-89 mL/min/1.73 square meters)    Stage 3A Moderate CKD (GFR = 45-59 mL/min/1.73 square meters)    Stage 3B Moderate CKD (GFR = 30-44 mL/min/1.73 square meters)    Stage 4 Severe CKD (GFR = 15-29 mL/min/1.73 square meters)    Stage 5 End Stage CKD (GFR <15 mL/min/1.73 square meters)  Note: GFR calculation is accurate only with a steady state creatinine    Protime-INR [[id number]]  (Normal) Collected: 11/05/24 2248    Lab Status: Final result Specimen: Blood from Arm, Right Updated: 11/05/24 2310     Protime 13.9 seconds      INR 1.02    Narrative:      INR Therapeutic Range    Indication                                             INR Range      Atrial Fibrillation                                               2.0-3.0  Hypercoagulable State                                    2.0.2.3  Left Ventricular Asist Device                            2.0-3.0  Mechanical Heart Valve                                  -    Aortic(with afib, MI, embolism, HF, LA enlargement,    and/or coagulopathy)                                     2.0-3.0 (2.5-3.5)     Mitral                                                              2.5-3.5  Prosthetic/Bioprosthetic Heart Valve               2.0-3.0  Venous thromboembolism (VTE: VT, PE        2.0-3.0    APTT [[id number]]  (Normal) Collected: 24    Lab Status: Final result Specimen: Blood from Arm, Right Updated: 24 2310     PTT 25 seconds     CBC and differential [[id number]]  (Abnormal) Collected: 24    Lab Status: Final result Specimen: Blood from Arm, Right Updated: 24 2300     WBC 17.63 Thousand/uL      RBC 4.32 Million/uL      Hemoglobin 13.7 g/dL      Hematocrit 42.6 %      MCV 99 fL      MCH 31.7 pg      MCHC 32.2 g/dL      RDW 12.7 %      MPV 11.5 fL      Platelets 229 Thousands/uL      nRBC 0 /100 WBCs      Segmented % 85 %      Immature Grans % 0 %      Lymphocytes % 8 %      Monocytes % 7 %      Eosinophils Relative 0 %      Basophils Relative 0 %      Absolute Neutrophils 14.85 Thousands/µL      Absolute Immature Grans 0.07 Thousand/uL      Absolute Lymphocytes 1.34 Thousands/µL      Absolute Monocytes 1.29 Thousand/µL      Eosinophils Absolute 0.03 Thousand/µL      Basophils Absolute 0.05 Thousands/µL     Blood culture #2 [[id number]] Collected: 24    Lab Status: In process Specimen: Blood from Arm, Right Updated: 24            XR chest portable   ED Interpretation by John Alejandro MD ([id number])   Abnormal   Possible opacities in R low lung field      CT chest abdomen pelvis w contrast    (Results Pending)       Procedures    ED Medication and Procedure Management   Prior to Admission Medications   Prescriptions Last Dose Informant Patient Reported? Taking?   Brivaracetam (Briviact) 100 MG TABS   No No   Si tablet (100 mg total) by Per PEG Tube route 2 (two) times a day   Probiotic Product (ALEXANDER-BID PROBIOTIC PO)  Outside Facility (Specify) Yes No   Si tablet by Per G Tube route daily   VITAMIN D PO  Outside Facility (Specify) Yes No   Sig: Take 1,000 mg  by mouth in the morning Given in her G-tube   acetaminophen (TYLENOL) 325 mg tablet  Outside Facility (Specify) Yes No   Sig: Take 650 mg by mouth every 4 (four) hours as needed for mild pain or fever   bisacodyl (DULCOLAX) 10 mg suppository  Outside Facility (Specify) Yes No   Sig: Insert 10 mg into the rectum daily   cannabidiol (Epidiolex) 100 mg/ml SOLN   No No   Si mL (500 mg total) by Per G Tube route 2 (two) times a day   cloBAZam (Onfi) 10 MG tablet   No No   Si tablet (10 mg total) by Per G Tube route 2 (two) times a day   diazepam (VALIUM) 5 MG/ML solution  Outside Facility (Specify) No No   Sig: If the patient has a seizure lasting more than 3 minutes then give 1mL by PEG tube, may repeat 1mL if she continues to have seizure for more than 10 minutes.   lacosamide (VIMPAT) 200 mg tablet   No No   Si tablet (200 mg total) by Per PEG Tube route every 12 (twelve) hours   magnesium hydroxide (MILK OF MAGNESIA) 400 mg/5 mL oral suspension  Outside Facility (Specify) Yes No   Si mL by Per G Tube route daily as needed for constipation   rufinamide (BANZEL) 400 mg tablet   No No   Si tablets (1,600 mg total) by Per G Tube route every 12 (twelve) hours   topiramate (TOPAMAX) 50 MG tablet   No No   Si tablets (250 mg total) by Per G Tube route every 12 (twelve) hours      Facility-Administered Medications: None     Current Discharge Medication List        CONTINUE these medications which have NOT CHANGED    Details   acetaminophen (TYLENOL) 325 mg tablet Take 650 mg by mouth every 4 (four) hours as needed for mild pain or fever      bisacodyl (DULCOLAX) 10 mg suppository Insert 10 mg into the rectum daily      Brivaracetam (Briviact) 100 MG TABS 1 tablet (100 mg total) by Per PEG Tube route 2 (two) times a day  Qty: 60 tablet, Refills: 5    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)      cannabidiol (Epidiolex) 100 mg/ml SOLN 5 mL (500 mg total)  by Per G Tube route 2 (two) times a day  Qty: 300 mL, Refills: 5    Associated Diagnoses: Intractable Lennox-Gastaut syndrome without status epilepticus (HCC)      cloBAZam (Onfi) 10 MG tablet 1 tablet (10 mg total) by Per G Tube route 2 (two) times a day  Qty: 60 tablet, Refills: 5    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  Qty: 30 mL, Refills: 0    Comments: Please discontinue Valtoco prescription.  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)      magnesium hydroxide (MILK OF MAGNESIA) 400 mg/5 mL oral suspension 30 mL by Per G Tube route daily as needed for constipation      Probiotic Product (ALEXANDER-BID PROBIOTIC PO) 1 tablet by Per G Tube route daily      rufinamide (BANZEL) 400 mg tablet 4 tablets (1,600 mg total) by Per G Tube route every 12 (twelve) hours  Qty: 240 tablet, Refills: 5    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  ED SEPSIS DOCUMENTATION   Time reflects when diagnosis was documented in both MDM as applicable and the Disposition within this note       Time User Action Codes Description Comment     11/6/2024  2:39 AM John Alejandro [J18.9] Multifocal pneumonia     11/6/2024  2:39 AM John Alejandro [A41.9] Sepsis (HCC)     11/6/2024  2:40 AM John Alejandro [D72.829] Leukocytosis                  John Alejandro MD  11/06/24 0610

## 2024-11-06 NOTE — ASSESSMENT & PLAN NOTE
Malnutrition Findings:                                 BMI Findings:           Body mass index is 18.99 kg/m².

## 2024-11-06 NOTE — H&P
H&P - Hospitalist   Name: Maira Bull 44 y.o. female I MRN: [id number]  Unit/Bed#: 415-01 I Date of Admission: 11/5/2024   Date of Service: 11/6/2024 I Hospital Day: 0     Assessment & Plan  Sepsis without septic shock (HCC)  Patient presented (11/5) from facility with reports of malfunctioning PEG tube  Patient nonverbal secondary to anoxic brain injury at baseline  History obtained from medical staff and record    In ED, patient incidentally noted with fever, tachycardia, tachypnea, leukocytosis-without lactic acidosis or hemodynamic compromise-MAP maintained greater than 65  Prompted infectious workup-UA negative, CT chest abdomen pelvis obtained which was suggestive of multifocal lung infiltrates suspected infectious/bacterial in origin  Integument intact  Received fluids/Rocephin in ED    Plan:  Admit to hospitalist service  Index suspicion for pulmonary source given evidence of multifocal pneumonia on CT imaging  Blood cultures drawn-pending  Proceed with empiric antimicrobial coverage with presumed pulmonary source-Rocephin/Zithromax  Monitor for evolution of shock physiology  Narrow antimicrobials for culture/sensitivity  Proceed with maintenance fluids  Multifocal pneumonia  As noted on CT imaging  Likely, source of patient's presentation with SIRS  Proceed with Rocephin/Zithromax  Check urine for strep/Legionella antigen  Check sputum culture if able  De-escalate antimicrobials as appropriate  Monitor for evolution of respiratory insufficiency  Adequate saturation on presentation  Nonintractable Lennox-Gastaut syndrome without status epilepticus (HCC)  Chronic condition/stable  Recently hospitalized secondary to intractable seizures-followed by neurology and was transferred to [facility]-antiepileptic regimen optimized and as follows:  Briviact increased to 100 mg BID   clobazam 10 mg BID  lacosamide 200 mg twice daily  rufinamide 1600 mg twice daily  topiramate 250 mg twice daily   Epidiolex  500 mg BID  Seizure precautions  Several antiepileptic medications on formulary-will request from nursing facility        VTE Pharmacologic Prophylaxis:   High Risk (Score >/= 5) - Pharmacological DVT Prophylaxis Ordered: enoxaparin (Lovenox). Sequential Compression Devices Ordered.  Code Status: Level 1 - Full Code   Discussion with family: Patient declined call to .     Anticipated Length of Stay: Patient will be admitted on an inpatient basis with an anticipated length of stay of greater than 2 midnights secondary to sepsis.    History of Present Illness   Chief Complaint: G-tube malfunction    Maira Bull is a 44 y.o. female with a PMH of anoxic brain injury, seizure disorder who presents with G-tube malfunction.    Patient unable to provide history.  History obtained from medical staff and record.  Presented this evening secondary to malfunctioning G-tube.  Quickly alleviated in the ED.  However, incidentally found with fever, tachycardia, tachypnea prompting further workup.  Revealed leukocytosis.  Without evidence of lactic acidosis or hemodynamic compromise.    UA negative, CT chest abdomen pelvis revealed multifocal pneumonia which was thought source.  Administered Rocephin and fluids in the ED.  Referred for admission secondary to above.    Review of Systems    Historical Information   Past Medical History:   Diagnosis Date    ADHD     Anoxic brain damage (HCC)     Autistic disorder     Breakthrough seizure (HCC) 8/1/2019    Dysphagia     Dysphagia, oropharyngeal phase     Gastrostomy tube dependent (HCC)     14 Fr as of 05/19/2020    Hyperammonemia (HCC)     Hyperkeratosis     Hypotension     Intellectual disability     Lennox-Gastaut syndrome with tonic seizures (HCC)     Lethargy     Liver enzyme elevation     Onychomycosis     Osteoporosis     Osteoporosis      Past Surgical History:   Procedure Laterality Date    ABDOMINAL SURGERY      CARDIAC PACEMAKER PLACEMENT      vns implant l  chest    IR GASTROJEJUNOSTOMY (GJ) TUBE PLACEMENT  12/8/2023    IR GASTROSTOMY (G) TUBE CHECK/CHANGE/REINSERTION/UPSIZE  6/21/2024    IR GASTROSTOMY (G) TUBE CHECK/CHANGE/REINSERTION/UPSIZE  9/26/2024    IR GASTROSTOMY TUBE PLACEMENT  11/21/2019    IR THORACENTESIS  12/17/2018    JEJUNOSTOMY FEEDING TUBE      history of - most recently, PEG tube    PEG TUBE PLACEMENT      OR INSJ/RPLCMT CRANIAL NEUROSTIM PULSE GENERATOR Left 12/18/2019    Procedure: REPLACEMENT IMPLANTABLE PULSE GENERATOR FOR VAGAL NERVE STIMULATOR, LEFT CHEST;  Surgeon: Patrick Canales MD;  Location: [facility];  Service: Neurosurgery    VAGAL NERVE STIMULATOR (VNS) PLACEMENT Left 8/12/2024    Procedure: Reopening of the left chest incision for placement of implantable pulse generator for vagal nerve stimulator;  Surgeon: Charles Pendleton MD;  Location: [facility];  Service: Neurosurgery     Social History     Tobacco Use    Smoking status: Never    Smokeless tobacco: Never   Vaping Use    Vaping status: Never Used   Substance and Sexual Activity    Alcohol use: Not Currently    Drug use: Not Currently    Sexual activity: Never     Birth control/protection: Abstinence     E-Cigarette/Vaping    E-Cigarette Use Never User      E-Cigarette/Vaping Substances    Nicotine No     THC No     CBD No     Flavoring No     Other No     Unknown No      Family history non-contributory  Social History:  Meds/Allergies   I have reviewed home medications with a medical source (PCP, Pharmacy, other).  Prior to Admission medications    Medication Sig Start Date End Date Taking? Authorizing Provider   acetaminophen (TYLENOL) 325 mg tablet Take 650 mg by mouth every 4 (four) hours as needed for mild pain or fever    Historical Provider, MD   bisacodyl (DULCOLAX) 10 mg suppository Insert 10 mg into the rectum daily    Historical Provider, MD   Brivaracetam (Briviact) 100 MG TABS 1 tablet (100 mg total) by Per PEG Tube route 2 (two) times a day 10/25/24   Quynh España MD    cannabidiol (Epidiolex) 100 mg/ml SOLN 5 mL (500 mg total) by Per G Tube route 2 (two) times a day 10/25/24   Quynh España MD   cloBAZam (Onfi) 10 MG tablet 1 tablet (10 mg total) by Per G Tube route 2 (two) times a day 10/25/24   Quynh España MD   diazepam (VALIUM) 5 MG/ML solution If the patient has a seizure lasting more than 3 minutes then give 1mL by PEG tube, may repeat 1mL if she continues to have seizure for more than 10 minutes. 8/13/24   Lenka Farris PA-C   lacosamide (VIMPAT) 200 mg tablet 1 tablet (200 mg total) by Per PEG Tube route every 12 (twelve) hours 10/25/24   Quynh España MD   magnesium hydroxide (MILK OF MAGNESIA) 400 mg/5 mL oral suspension 30 mL by Per G Tube route daily as needed for constipation    Historical Provider, MD   Probiotic Product (ALEXANDER-BID PROBIOTIC PO) 1 tablet by Per G Tube route daily    Historical Provider, MD   rufinamide (BANZEL) 400 mg tablet 4 tablets (1,600 mg total) by Per G Tube route every 12 (twelve) hours 10/25/24   Quynh España MD   topiramate (TOPAMAX) 50 MG tablet 5 tablets (250 mg total) by Per G Tube route every 12 (twelve) hours 10/25/24   Quynh España MD   VITAMIN D PO Take 1,000 mg by mouth in the morning Given in her G-tube    Historical Provider, MD     Allergies   Allergen Reactions    Felbamate        Objective :  Temp:  [99.3 °F (37.4 °C)-101.2 °F (38.4 °C)] 99.3 °F (37.4 °C)  HR:  [] 78  BP: ()/(53-64) 91/53  Resp:  [16-20] 20  SpO2:  [96 %-97 %] 96 %  O2 Device: None (Room air)    Physical Exam  Constitutional:       General: She is in acute distress.      Appearance: She is ill-appearing. She is not toxic-appearing or diaphoretic.   HENT:      Right Ear: External ear normal.      Left Ear: External ear normal.      Mouth/Throat:      Mouth: Mucous membranes are dry.      Pharynx: Oropharynx is clear.   Eyes:      General: No scleral icterus.     Conjunctiva/sclera: Conjunctivae normal.   Cardiovascular:      Rate and  Rhythm: Regular rhythm.      Pulses: Normal pulses.      Heart sounds: Murmur heard.      No friction rub. No gallop.   Pulmonary:      Effort: Tachypnea and prolonged expiration present. No accessory muscle usage, respiratory distress or retractions.      Breath sounds: Decreased air movement and transmitted upper airway sounds present. No stridor. Examination of the right-middle field reveals decreased breath sounds and rhonchi. Examination of the right-lower field reveals decreased breath sounds, wheezing and rhonchi. Examination of the left-lower field reveals wheezing and rhonchi. Decreased breath sounds, wheezing and rhonchi present. No rales.   Abdominal:      General: Abdomen is flat.      Palpations: Abdomen is soft.   Musculoskeletal:         General: Deformity present.      Right lower leg: No edema.      Left lower leg: No edema.   Lymphadenopathy:      Cervical: No cervical adenopathy.   Skin:     General: Skin is warm and dry.      Capillary Refill: Capillary refill takes less than 2 seconds.      Findings: No lesion.   Neurological:      Mental Status: She is alert. Mental status is at baseline.          Lines/Drains:  Lines/Drains/Airways       Active Status       Name Placement date Placement time Site Days    Gastrostomy/Enterostomy Percutaneous Interventional Gastrostomy 16 Fr. LUQ 09/26/24  1237  LUQ  40                          Lab Results: I have reviewed the following results:  Results from last 7 days   Lab Units 11/05/24  2248   WBC Thousand/uL 17.63*   HEMOGLOBIN g/dL 13.7   HEMATOCRIT % 42.6   PLATELETS Thousands/uL 229   SEGS PCT % 85*   LYMPHO PCT % 8*   MONO PCT % 7   EOS PCT % 0     Results from last 7 days   Lab Units 11/05/24  2248   SODIUM mmol/L 140   POTASSIUM mmol/L 3.5   CHLORIDE mmol/L 107   CO2 mmol/L 21   BUN mg/dL 17   CREATININE mg/dL 0.54*   ANION GAP mmol/L 12   CALCIUM mg/dL 10.1   ALBUMIN g/dL 4.5   TOTAL BILIRUBIN mg/dL 0.63   ALK PHOS U/L 102   ALT U/L 37   AST U/L 22    GLUCOSE RANDOM mg/dL 119     Results from last 7 days   Lab Units 11/05/24  2248   INR  1.02         Lab Results   Component Value Date    HGBA1C 5.2 08/12/2024    HGBA1C 5.3 08/18/2022    HGBA1C 5.6 01/20/2022     Results from last 7 days   Lab Units 11/05/24  2248   LACTIC ACID mmol/L 1.0   PROCALCITONIN ng/ml 0.13       Imaging Results Review: I reviewed radiology reports from this admission including: CT chest.  Other Study Results Review: No additional pertinent studies reviewed.    Administrative Statements   I have spent a total time of 41 minutes in caring for this patient on the day of the visit/encounter including Diagnostic results, Impressions, Reviewing / ordering tests, medicine, procedures  , Obtaining or reviewing history  , and Communicating with other healthcare professionals .    ** Please Note: This note has been constructed using a voice recognition system. **

## 2024-11-06 NOTE — RESPIRATORY THERAPY NOTE
RT Protocol Note  Maira Bull 44 y.o. female MRN: [id number]  Unit/Bed#: 415-01 Encounter: [id number]    Assessment    Principal Problem:    Sepsis without septic shock (HCC)  Active Problems:    Multifocal pneumonia    Nonintractable Lennox-Gastaut syndrome without status epilepticus (HCC)      Home Pulmonary Medications: None       Past Medical History:   Diagnosis Date    ADHD     Anoxic brain damage (HCC)     Autistic disorder     Breakthrough seizure (HCC) 8/1/2019    Dysphagia     Dysphagia, oropharyngeal phase     Gastrostomy tube dependent (HCC)     14 Fr as of 05/19/2020    Hyperammonemia (HCC)     Hyperkeratosis     Hypotension     Intellectual disability     Lennox-Gastaut syndrome with tonic seizures (HCC)     Lethargy     Liver enzyme elevation     Onychomycosis     Osteoporosis     Osteoporosis      Social History     Socioeconomic History    Marital status: Single     Spouse name: None    Number of children: None    Years of education: None    Highest education level: None   Occupational History    None   Tobacco Use    Smoking status: Never     Passive exposure: Never    Smokeless tobacco: Never   Vaping Use    Vaping status: Never Used   Substance and Sexual Activity    Alcohol use: Never    Drug use: Never    Sexual activity: Never     Birth control/protection: Abstinence   Other Topics Concern    None   Social History Narrative    None     Social Determinants of Health     Financial Resource Strain: Not on file   Food Insecurity: Patient Unable To Answer (11/6/2024)    Nursing - Inadequate Food Risk Classification     Worried About Running Out of Food in the Last Year: Never true     Ran Out of Food in the Last Year: Never true     Ran Out of Food in the Last Year: 97   Transportation Needs: Patient Unable To Answer (11/6/2024)    Nursing - Transportation Risk Classification     Lack of Transportation: Not on file     Lack of Transportation: 97   Physical Activity: Not on file   Stress: Not on  "file   Social Connections: Not on file   Intimate Partner Violence: Patient Unable To Answer (2024)    Nursing IPS     Feels Physically and Emotionally Safe: Not on file     Physically Hurt by Someone: Not on file     Humiliated or Emotionally Abused by Someone: Not on file     Physically Hurt by Someone: 97     Hurt or Threatened by Someone: 97   Housing Stability: Patient Unable To Answer (2024)    Nursing: Inadequate Housing Risk Classification     Has Housing: Not on file     Worried About Losing Housing: Not on file     Unable to Get Utilities: Not on file     Unable to Pay for Housing in the Last Year: 97     Has Housin       Subjective         Objective    Physical Exam:   Assessment Type: Assess only  General Appearance: Awake  Respiratory Pattern: Normal  Chest Assessment: Chest expansion symmetrical  Bilateral Breath Sounds: Diminished  Cough: None  O2 Device: RA    Vitals:  Blood pressure 99/54, pulse 88, temperature (!) 97.4 °F (36.3 °C), temperature source Axillary, resp. rate 17, height 5' 1\" (1.549 m), weight 44.5 kg (98 lb 1.7 oz), SpO2 94%.          Imaging and other studies: Results Review Statement: I reviewed radiology reports from this admission including: chest xray.    O2 Device: RA     Plan    Respiratory Plan: No distress/Pulmonary history, Discontinue Protocol  Airway Clearance Plan: Discontinue Protocol         "

## 2024-11-06 NOTE — PLAN OF CARE
Problem: Prexisting or High Potential for Compromised Skin Integrity  Goal: Skin integrity is maintained or improved  Description: INTERVENTIONS:  - Identify patients at risk for skin breakdown  - Assess and monitor skin integrity  - Assess and monitor nutrition and hydration status  - Monitor labs   - Assess for incontinence   - Turn and reposition patient  - Assist with mobility/ambulation  - Relieve pressure over bony prominences  - Avoid friction and shearing  - Provide appropriate hygiene as needed including keeping skin clean and dry  - Evaluate need for skin moisturizer/barrier cream  - Collaborate with interdisciplinary team   - Patient/family teaching  - Consider wound care consult   Outcome: Progressing     Problem: PAIN - ADULT  Goal: Verbalizes/displays adequate comfort level or baseline comfort level  Description: Interventions:  - Encourage patient to monitor pain and request assistance  - Assess pain using appropriate pain scale  - Administer analgesics based on type and severity of pain and evaluate response  - Implement non-pharmacological measures as appropriate and evaluate response  - Consider cultural and social influences on pain and pain management  - Notify physician/advanced practitioner if interventions unsuccessful or patient reports new pain  Outcome: Progressing     Problem: INFECTION - ADULT  Goal: Absence or prevention of progression during hospitalization  Description: INTERVENTIONS:  - Assess and monitor for signs and symptoms of infection  - Monitor lab/diagnostic results  - Monitor all insertion sites, i.e. indwelling lines, tubes, and drains  - Monitor endotracheal if appropriate and nasal secretions for changes in amount and color  - [place] appropriate cooling/warming therapies per order  - Administer medications as ordered  - Instruct and encourage patient and family to use good hand hygiene technique  - Identify and instruct in appropriate isolation precautions for  identified infection/condition  Outcome: Progressing     Problem: SAFETY ADULT  Goal: Patient will remain free of falls  Description: INTERVENTIONS:  - Educate patient/family on patient safety including physical limitations  - Instruct patient to call for assistance with activity   - Consult OT/PT to assist with strengthening/mobility   - Keep Call bell within reach  - Keep bed low and locked with side rails adjusted as appropriate  - Keep care items and personal belongings within reach  - Initiate and maintain comfort rounds  - Make Fall Risk Sign visible to staff  - Offer Toileting every 2 Hours, in advance of need  - Initiate/Maintain bed alarm  - Obtain necessary fall risk management equipment: bed alarm  - Apply yellow socks and bracelet for high fall risk patients  - Consider moving patient to room near nurses station  Outcome: Progressing  Goal: Maintain or return to baseline ADL function  Description: INTERVENTIONS:  -  Assess patient's ability to carry out ADLs; assess patient's baseline for ADL function and identify physical deficits which impact ability to perform ADLs (bathing, care of mouth/teeth, toileting, grooming, dressing, etc.)  - Assess/evaluate cause of self-care deficits   - Assess range of motion  - Assess patient's mobility; develop plan if impaired  - Assess patient's need for assistive devices and provide as appropriate  - Encourage maximum independence but intervene and supervise when necessary  - Involve family in performance of ADLs  - Assess for home care needs following discharge   - Consider OT consult to assist with ADL evaluation and planning for discharge  - Provide patient education as appropriate  Outcome: Progressing  Goal: Maintains/Returns to pre admission functional level  Description: INTERVENTIONS:  - Perform AM-PAC 6 Click Basic Mobility/ Daily Activity assessment daily.  - Set and communicate daily mobility goal to care team and patient/family/caregiver.   - Collaborate  with rehabilitation services on mobility goals if consulted  - Perform Range of Motion 3 times a day.  - Reposition patient every 2 hours.  - Dangle patient 3 times a day  - Stand patient 3 times a day  - Ambulate patient 3 times a day  - Out of bed to chair 3 times a day   - Out of bed for meals 3 times a day  - Out of bed for toileting  - Record patient progress and toleration of activity level   Outcome: Progressing     Problem: DISCHARGE PLANNING  Goal: Discharge to home or other facility with appropriate resources  Description: INTERVENTIONS:  - Identify barriers to discharge w/patient and caregiver  - Arrange for needed discharge resources and transportation as appropriate  - Identify discharge learning needs (meds, wound care, etc.)  - Arrange for interpretive services to assist at discharge as needed  - Refer to Case Management Department for coordinating discharge planning if the patient needs post-hospital services based on physician/advanced practitioner order or complex needs related to functional status, cognitive ability, or social support system  Outcome: Progressing     Problem: Knowledge Deficit  Goal: Patient/family/caregiver demonstrates understanding of disease process, treatment plan, medications, and discharge instructions  Description: Complete learning assessment and assess knowledge base.  Interventions:  - Provide teaching at level of understanding  - Provide teaching via preferred learning methods  Outcome: Progressing     Problem: Nutrition/Hydration-ADULT  Goal: Nutrient/Hydration intake appropriate for improving, restoring or maintaining nutritional needs  Description: Monitor and assess patient's nutrition/hydration status for malnutrition. Collaborate with interdisciplinary team and initiate plan and interventions as ordered.  Monitor patient's weight and dietary intake as ordered or per policy. Utilize nutrition screening tool and intervene as necessary. Determine patient's food  preferences and provide high-protein, high-caloric foods as appropriate.     INTERVENTIONS:  - Monitor oral intake, urinary output, labs, and treatment plans  - Assess nutrition and hydration status and recommend course of action  - Evaluate amount of meals eaten  - Assist patient with eating if necessary   - Allow adequate time for meals  - Recommend/ encourage appropriate diets, oral nutritional supplements, and vitamin/mineral supplements  - Order, calculate, and assess calorie counts as needed  - Recommend, monitor, and adjust tube feedings and TPN/PPN based on assessed needs  - Assess need for intravenous fluids  - Provide specific nutrition/hydration education as appropriate  - Include patient/family/caregiver in decisions related to nutrition  Outcome: Progressing     Problem: CARDIOVASCULAR - ADULT  Goal: Maintains optimal cardiac output and hemodynamic stability  Description: INTERVENTIONS:  - Monitor I/O, vital signs and rhythm  - Monitor for S/S and trends of decreased cardiac output  - Administer and titrate ordered vasoactive medications to optimize hemodynamic stability  - Assess quality of pulses, skin color and temperature  - Assess for signs of decreased coronary artery perfusion  - Instruct patient to report change in severity of symptoms  Outcome: Progressing     Problem: RESPIRATORY - ADULT  Goal: Achieves optimal ventilation and oxygenation  Description: INTERVENTIONS:  - Assess for changes in respiratory status  - Assess for changes in mentation and behavior  - Position to facilitate oxygenation and minimize respiratory effort  - Oxygen administered by appropriate delivery if ordered  - Initiate smoking cessation education as indicated  - Encourage broncho-pulmonary hygiene including cough, deep breathe, Incentive Spirometry  - Assess the need for suctioning and aspirate as needed  - Assess and instruct to report SOB or any respiratory difficulty  - Respiratory Therapy support as  indicated  Outcome: Progressing     Problem: METABOLIC, FLUID AND ELECTROLYTES - ADULT  Goal: Electrolytes maintained within normal limits  Description: INTERVENTIONS:  - Monitor labs and assess patient for signs and symptoms of electrolyte imbalances  - Administer electrolyte replacement as ordered  - Monitor response to electrolyte replacements, including repeat lab results as appropriate  - Instruct patient on fluid and nutrition as appropriate  Outcome: Progressing

## 2024-11-06 NOTE — PLAN OF CARE
Problem: INFECTION - ADULT  Goal: Absence or prevention of progression during hospitalization  Description: INTERVENTIONS:  - Assess and monitor for signs and symptoms of infection  - Monitor lab/diagnostic results  - Monitor all insertion sites, i.e. indwelling lines, tubes, and drains  - Monitor endotracheal if appropriate and nasal secretions for changes in amount and color  - [place] appropriate cooling/warming therapies per order  - Administer medications as ordered  - Instruct and encourage patient and family to use good hand hygiene technique  - Identify and instruct in appropriate isolation precautions for identified infection/condition  Outcome: Progressing  Goal: Absence of fever/infection during neutropenic period  Description: INTERVENTIONS:  - Monitor WBC    Outcome: Progressing     Problem: PAIN - ADULT  Goal: Verbalizes/displays adequate comfort level or baseline comfort level  Description: Interventions:  - Encourage patient to monitor pain and request assistance  - Assess pain using appropriate pain scale  - Administer analgesics based on type and severity of pain and evaluate response  - Implement non-pharmacological measures as appropriate and evaluate response  - Consider cultural and social influences on pain and pain management  - Notify physician/advanced practitioner if interventions unsuccessful or patient reports new pain  Outcome: Progressing     Problem: Knowledge Deficit  Goal: Patient/family/caregiver demonstrates understanding of disease process, treatment plan, medications, and discharge instructions  Description: Complete learning assessment and assess knowledge base.  Interventions:  - Provide teaching at level of understanding  - Provide teaching via preferred learning methods  Outcome: Progressing

## 2024-11-06 NOTE — ASSESSMENT & PLAN NOTE
Chronic condition/stable  Recently hospitalized secondary to intractable seizures-followed by neurology and was transferred to [facility]-antiepileptic regimen optimized and as follows:  Briviact increased to 100 mg BID   clobazam 10 mg BID  lacosamide 200 mg twice daily  rufinamide 1600 mg twice daily  topiramate 250 mg twice daily   Epidiolex 500 mg BID  Seizure precautions  Several antiepileptic medications on formulary-will request from nursing facility

## 2024-11-06 NOTE — CASE MANAGEMENT
Case Management Discharge Planning Note    Patient name Maira Bull  Location /415-01 MRN [id number]  : 1980 Date 2024       Current Admission Date: 2024  Current Admission Diagnosis:Sepsis without septic shock (HCC)   Patient Active Problem List    Diagnosis Date Noted Date Diagnosed    Hypotension 2024     Fracture of tooth 2024     PEG tube malfunction (HCC) 2020     Labyrinthine disorder 03/10/2020     Intellectual disability with epilepsy (HCC) 12/10/2019     Fatty infiltration of liver 2019     Dislodged gastrostomy tube 2019     S/P placement of VNS (vagus nerve stimulation) device 2019     Breast mass 01/15/2019     Sedated due to multiple medications 01/10/2019     Hypophosphatemia 2018     Right atrial enlargement 10/13/2018     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     Sepsis without septic shock (HCC) 10/09/2018     Skin breakdown 10/09/2018     Incontinence 2018     Somnolence 2018     Nonintractable Lennox-Gastaut syndrome without status epilepticus (HCC) 2018     Labial cyst 2017     Positive blood culture 2017     Multifocal pneumonia 2017     Osteoporosis 2013     Onychomycosis 2013     Hyperkeratosis 2013     Cerebral anoxic injury (HCC) 2013       LOS (days): 0  Geometric Mean LOS (GMLOS) (days):   Days to GMLOS:     OBJECTIVE:  Risk of Unplanned Readmission Score: 20.36         Current admission status: Inpatient   Preferred Pharmacy:   [id number] [address], [place]  [address] [address]  Phone: [phone] Fax: [phone]    [facility] - [place], [place] - [address]  [address]  [address]  [facility] [address]  Phone: [phone] Fax: [phone]    [facility], [place] - [address]  [address] [place] [address]  Phone: [phone] Fax:  [phone]    [facility], [place] - [address]  [address] [facility] [address]  Phone: [phone] Fax: [phone]    [facility] [place] - [place] - [address]  [address]  [place] [address]  Phone: [phone] Fax: [phone]    [facility]/pharmacy #[id number] - [place] - [address]  [address]  [place] [address]  Phone: [phone] Fax: [phone]    Primary Care Provider: Doe Miller DO    Primary Insurance: MEDICARE  Secondary Insurance: [facility]    DISCHARGE DETAILS:  Chart review done. Pt is a long term resident at [facility] and is dependent care there. Plans are for pt to return to [facility] on dc. CM will follow and await pt to be medically ready for dc.

## 2024-11-06 NOTE — ASSESSMENT & PLAN NOTE
Patient presented (11/5) from facility with reports of malfunctioning PEG tube  Patient nonverbal secondary to anoxic brain injury at baseline  History obtained from medical staff and record    In ED, patient incidentally noted with fever, tachycardia, tachypnea, leukocytosis-without lactic acidosis or hemodynamic compromise-MAP maintained greater than 65  Prompted infectious workup-UA negative, CT chest abdomen pelvis obtained which was suggestive of multifocal lung infiltrates suspected infectious/bacterial in origin  Integument intact  Received fluids/Rocephin in ED    Plan:  Admit to hospitalist service  Index suspicion for pulmonary source given evidence of multifocal pneumonia on CT imaging  Blood cultures drawn-pending  Proceed with empiric antimicrobial coverage with presumed pulmonary source-Rocephin/Zithromax  Monitor for evolution of shock physiology  Narrow antimicrobials for culture/sensitivity  Proceed with maintenance fluids

## 2024-11-06 NOTE — ASSESSMENT & PLAN NOTE
As noted on CT imaging  Likely, source of patient's presentation with SIRS  Proceed with Rocephin/Zithromax  Check urine for strep/Legionella antigen  Check sputum culture if able  De-escalate antimicrobials as appropriate  Monitor for evolution of respiratory insufficiency  Adequate saturation on presentation

## 2024-11-07 LAB
ANION GAP SERPL CALCULATED.3IONS-SCNC: 6 MMOL/L (ref 4–13)
ATRIAL RATE: 107 BPM
BASOPHILS # BLD AUTO: 0.06 THOUSANDS/ÂΜL (ref 0–0.1)
BASOPHILS NFR BLD AUTO: 1 % (ref 0–1)
BUN SERPL-MCNC: 13 MG/DL (ref 5–25)
CALCIUM SERPL-MCNC: 8.5 MG/DL (ref 8.4–10.2)
CHLORIDE SERPL-SCNC: 115 MMOL/L (ref 96–108)
CO2 SERPL-SCNC: 17 MMOL/L (ref 21–32)
CREAT SERPL-MCNC: 0.37 MG/DL (ref 0.6–1.3)
EOSINOPHIL # BLD AUTO: 0.27 THOUSAND/ÂΜL (ref 0–0.61)
EOSINOPHIL NFR BLD AUTO: 3 % (ref 0–6)
ERYTHROCYTE [DISTWIDTH] IN BLOOD BY AUTOMATED COUNT: 13.2 % (ref 11.6–15.1)
GFR SERPL CREATININE-BSD FRML MDRD: 131 ML/MIN/1.73SQ M
GLUCOSE SERPL-MCNC: 139 MG/DL (ref 65–140)
HCT VFR BLD AUTO: 31.5 % (ref 34.8–46.1)
HGB BLD-MCNC: 10 G/DL (ref 11.5–15.4)
IMM GRANULOCYTES # BLD AUTO: 0.03 THOUSAND/UL (ref 0–0.2)
IMM GRANULOCYTES NFR BLD AUTO: 0 % (ref 0–2)
LYMPHOCYTES # BLD AUTO: 2.21 THOUSANDS/ÂΜL (ref 0.6–4.47)
LYMPHOCYTES NFR BLD AUTO: 26 % (ref 14–44)
MCH RBC QN AUTO: 32.4 PG (ref 26.8–34.3)
MCHC RBC AUTO-ENTMCNC: 31.7 G/DL (ref 31.4–37.4)
MCV RBC AUTO: 102 FL (ref 82–98)
MONOCYTES # BLD AUTO: 0.76 THOUSAND/ÂΜL (ref 0.17–1.22)
MONOCYTES NFR BLD AUTO: 9 % (ref 4–12)
MRSA NOSE QL CULT: NORMAL
NEUTROPHILS # BLD AUTO: 5.15 THOUSANDS/ÂΜL (ref 1.85–7.62)
NEUTS SEG NFR BLD AUTO: 61 % (ref 43–75)
NRBC BLD AUTO-RTO: 0 /100 WBCS
P AXIS: 73 DEGREES
PLATELET # BLD AUTO: 184 THOUSANDS/UL (ref 149–390)
PMV BLD AUTO: 12 FL (ref 8.9–12.7)
POTASSIUM SERPL-SCNC: 3.6 MMOL/L (ref 3.5–5.3)
PR INTERVAL: 150 MS
PROCALCITONIN SERPL-MCNC: 0.07 NG/ML
QRS AXIS: 95 DEGREES
QRSD INTERVAL: 70 MS
QT INTERVAL: 314 MS
QTC INTERVAL: 419 MS
RBC # BLD AUTO: 3.09 MILLION/UL (ref 3.81–5.12)
SODIUM SERPL-SCNC: 138 MMOL/L (ref 135–147)
T WAVE AXIS: 64 DEGREES
VENTRICULAR RATE: 107 BPM
WBC # BLD AUTO: 8.48 THOUSAND/UL (ref 4.31–10.16)

## 2024-11-07 PROCEDURE — 99232 SBSQ HOSP IP/OBS MODERATE 35: CPT | Performed by: FAMILY MEDICINE

## 2024-11-07 PROCEDURE — 93010 ELECTROCARDIOGRAM REPORT: CPT | Performed by: INTERNAL MEDICINE

## 2024-11-07 PROCEDURE — 85025 COMPLETE CBC W/AUTO DIFF WBC: CPT | Performed by: FAMILY MEDICINE

## 2024-11-07 PROCEDURE — 80048 BASIC METABOLIC PNL TOTAL CA: CPT | Performed by: FAMILY MEDICINE

## 2024-11-07 PROCEDURE — 84145 PROCALCITONIN (PCT): CPT | Performed by: FAMILY MEDICINE

## 2024-11-07 RX ADMIN — LACOSAMIDE 200 MG: 50 TABLET, FILM COATED ORAL at 21:07

## 2024-11-07 RX ADMIN — BISACODYL 10 MG: 10 SUPPOSITORY RECTAL at 09:21

## 2024-11-07 RX ADMIN — TOPIRAMATE 250 MG: 100 TABLET, FILM COATED ORAL at 09:21

## 2024-11-07 RX ADMIN — CLOBAZAM 10 MG: 10 TABLET ORAL at 09:24

## 2024-11-07 RX ADMIN — CLOBAZAM 10 MG: 10 TABLET ORAL at 21:07

## 2024-11-07 RX ADMIN — SODIUM CHLORIDE 75 ML/HR: 0.9 INJECTION, SOLUTION INTRAVENOUS at 09:26

## 2024-11-07 RX ADMIN — LACOSAMIDE 200 MG: 50 TABLET, FILM COATED ORAL at 09:21

## 2024-11-07 RX ADMIN — BRIVARACETAM 100 MG: 10 SOLUTION ORAL at 09:22

## 2024-11-07 RX ADMIN — RUFINAMIDE 1600 MG: 40 SUSPENSION ORAL at 21:07

## 2024-11-07 RX ADMIN — TOPIRAMATE 250 MG: 100 TABLET, FILM COATED ORAL at 21:07

## 2024-11-07 RX ADMIN — ENOXAPARIN SODIUM 40 MG: 40 INJECTION SUBCUTANEOUS at 09:22

## 2024-11-07 RX ADMIN — BRIVARACETAM 100 MG: 10 SOLUTION ORAL at 17:49

## 2024-11-07 RX ADMIN — AZITHROMYCIN MONOHYDRATE 500 MG: 500 INJECTION, POWDER, LYOPHILIZED, FOR SOLUTION INTRAVENOUS at 04:25

## 2024-11-07 RX ADMIN — RUFINAMIDE 1600 MG: 40 SUSPENSION ORAL at 09:22

## 2024-11-07 NOTE — ASSESSMENT & PLAN NOTE
Patient presented (11/5) from facility with reports of malfunctioning PEG tube  Patient nonverbal secondary to anoxic brain injury at baseline  History obtained from medical staff and record    In ED, patient incidentally noted with fever, tachycardia, tachypnea, leukocytosis-without lactic acidosis or hemodynamic compromise-MAP maintained greater than 65  Prompted infectious workup-UA negative, CT chest abdomen pelvis obtained which was suggestive of multifocal lung infiltrates suspected infectious/bacterial in origin  Integument intact  Received fluids/Rocephin in ED    Appears to be pulmonary source given evidence of multifocal pneumonia on CT imaging  Blood cultures - no growth to date  Proceed with empiric antimicrobial coverage with presumed pulmonary source-Rocephin/Zithromax  Monitor CBC, VS  Narrow antimicrobials for culture/sensitivity pending diagnostic data  D/c IVF

## 2024-11-07 NOTE — ASSESSMENT & PLAN NOTE
As noted on CT imaging  Likely, source of patient's presentation with SIRS  Patient remains on room air and appears clinically improved  Proceed with Rocephin/Zithromax  Patient incontinent of urine - unable to check for strep/Legionella antigen  Unable to obtain sputum culture thus far  Anticipate discharge in 24 to 48 hours and transitioning to antibiotics via PEG tube

## 2024-11-07 NOTE — ASSESSMENT & PLAN NOTE
Chronic condition/stable  Recently hospitalized secondary to intractable seizures-followed by neurology and was transferred to [facility]-antiepileptic regimen optimized and as follows:  Briviact increased to 100 mg BID   clobazam 10 mg BID  lacosamide 200 mg twice daily  rufinamide 1600 mg twice daily  topiramate 250 mg twice daily   Epidiolex 500 mg BID  Seizure precautions  Epidiolex (cannabinoid) not available on formulary, other medications continued

## 2024-11-07 NOTE — PLAN OF CARE
Problem: Prexisting or High Potential for Compromised Skin Integrity  Goal: Skin integrity is maintained or improved  Description: INTERVENTIONS:  - Identify patients at risk for skin breakdown  - Assess and monitor skin integrity  - Assess and monitor nutrition and hydration status  - Monitor labs   - Assess for incontinence   - Turn and reposition patient  - Assist with mobility/ambulation  - Relieve pressure over bony prominences  - Avoid friction and shearing  - Provide appropriate hygiene as needed including keeping skin clean and dry  - Evaluate need for skin moisturizer/barrier cream  - Collaborate with interdisciplinary team   - Patient/family teaching  - Consider wound care consult   Outcome: Progressing     Problem: PAIN - ADULT  Goal: Verbalizes/displays adequate comfort level or baseline comfort level  Description: Interventions:  - Encourage patient to monitor pain and request assistance  - Assess pain using appropriate pain scale  - Administer analgesics based on type and severity of pain and evaluate response  - Implement non-pharmacological measures as appropriate and evaluate response  - Consider cultural and social influences on pain and pain management  - Notify physician/advanced practitioner if interventions unsuccessful or patient reports new pain  Outcome: Progressing     Problem: INFECTION - ADULT  Goal: Absence or prevention of progression during hospitalization  Description: INTERVENTIONS:  - Assess and monitor for signs and symptoms of infection  - Monitor lab/diagnostic results  - Monitor all insertion sites, i.e. indwelling lines, tubes, and drains  - Monitor endotracheal if appropriate and nasal secretions for changes in amount and color  - [place] appropriate cooling/warming therapies per order  - Administer medications as ordered  - Instruct and encourage patient and family to use good hand hygiene technique  - Identify and instruct in appropriate isolation precautions for  identified infection/condition  Outcome: Progressing     Problem: SAFETY ADULT  Goal: Patient will remain free of falls  Description: INTERVENTIONS:  - Educate patient/family on patient safety including physical limitations  - Instruct patient to call for assistance with activity   - Consult OT/PT to assist with strengthening/mobility   - Keep Call bell within reach  - Keep bed low and locked with side rails adjusted as appropriate  - Keep care items and personal belongings within reach  - Initiate and maintain comfort rounds  - Make Fall Risk Sign visible to staff  - Offer Toileting every 2 Hours, in advance of need  - Initiate/Maintain bedalarm  - Obtain necessary fall risk management equipment: -  - Apply yellow socks and bracelet for high fall risk patients  - Consider moving patient to room near nurses station  Outcome: Progressing  Goal: Maintain or return to baseline ADL function  Description: INTERVENTIONS:  -  Assess patient's ability to carry out ADLs; assess patient's baseline for ADL function and identify physical deficits which impact ability to perform ADLs (bathing, care of mouth/teeth, toileting, grooming, dressing, etc.)  - Assess/evaluate cause of self-care deficits   - Assess range of motion  - Assess patient's mobility; develop plan if impaired  - Assess patient's need for assistive devices and provide as appropriate  - Encourage maximum independence but intervene and supervise when necessary  - Involve family in performance of ADLs  - Assess for home care needs following discharge   - Consider OT consult to assist with ADL evaluation and planning for discharge  - Provide patient education as appropriate  Outcome: Progressing  Goal: Maintains/Returns to pre admission functional level  Description: INTERVENTIONS:  - Perform AM-PAC 6 Click Basic Mobility/ Daily Activity assessment daily.  - Set and communicate daily mobility goal to care team and patient/family/caregiver.   - Collaborate with  rehabilitation services on mobility goals if consulted  - Perform Range of Motion 4 times a day.  - Reposition patient every 2 hours.  - Dangle patient 2 times a day  - Stand patient 2 times a day  - Ambulate patient 2 times a day  - Out of bed to chair 2 times a day   - Out of bed for meals 2 times a day  - Out of bed for toileting  - Record patient progress and toleration of activity level   Outcome: Progressing

## 2024-11-07 NOTE — PROGRESS NOTES
Patient:    MRN:  [id number]    Abdelrahman Request ID:  [id number]    Level of care reserved:  Skilled Nursing Facility    Partner Reserved:  [facility] [place] [address] [phone]    Clinical needs requested:    Geography searched:  10 miles around [phone]    Start of Service:    Request sent:  1:27pm EST on 11/6/2024 by Danna Fonseca    Partner reserved:  1:07pm EST on 11/7/2024 by Danna Fonseca    Choice list shared:  1:07pm EST on 11/7/2024 by Danna Fonseca

## 2024-11-07 NOTE — PROGRESS NOTES
Progress Note - Hospitalist   Name: Maira Bull 44 y.o. female I MRN: [id number]  Unit/Bed#: 415-01 I Date of Admission: 11/5/2024   Date of Service: 11/7/2024 I Hospital Day: 1    Assessment & Plan  Sepsis without septic shock (HCC)  Patient presented (11/5) from facility with reports of malfunctioning PEG tube  Patient nonverbal secondary to anoxic brain injury at baseline  History obtained from medical staff and record    In ED, patient incidentally noted with fever, tachycardia, tachypnea, leukocytosis-without lactic acidosis or hemodynamic compromise-MAP maintained greater than 65  Prompted infectious workup-UA negative, CT chest abdomen pelvis obtained which was suggestive of multifocal lung infiltrates suspected infectious/bacterial in origin  Integument intact  Received fluids/Rocephin in ED    Appears to be pulmonary source given evidence of multifocal pneumonia on CT imaging  Blood cultures - no growth to date  Proceed with empiric antimicrobial coverage with presumed pulmonary source-Rocephin/Zithromax  Monitor CBC, VS  Narrow antimicrobials for culture/sensitivity pending diagnostic data  D/c IVF  Multifocal pneumonia  As noted on CT imaging  Likely, source of patient's presentation with SIRS  Patient remains on room air and appears clinically improved  Proceed with Rocephin/Zithromax  Patient incontinent of urine - unable to check for strep/Legionella antigen  Unable to obtain sputum culture thus far  Anticipate discharge in 24 to 48 hours and transitioning to antibiotics via PEG tube  Nonintractable Lennox-Gastaut syndrome without status epilepticus (HCC)  Chronic condition/stable  Recently hospitalized secondary to intractable seizures-followed by neurology and was transferred to [facility]-antiepileptic regimen optimized and as follows:  Briviact increased to 100 mg BID   clobazam 10 mg BID  lacosamide 200 mg twice daily  rufinamide 1600 mg twice daily  topiramate 250 mg twice daily    Epidiolex 500 mg BID  Seizure precautions  Epidiolex (cannabinoid) not available on formulary, other medications continued      VTE Pharmacologic Prophylaxis:    Lovenox    Mobility:   Basic Mobility Inpatient Raw Score: 10  -[facility] Goal: 4: Move to chair/commode  -HLM Achieved: 2: Bed activities/Dependent transfer      Patient Centered Rounds: I performed bedside rounds with nursing staff today.   Discussions with Specialists or Other Care Team Provider: CM    Education and Discussions with Family / Patient: Updated  (father) at bedside.    Current Length of Stay: 1 day(s)  Current Patient Status: Inpatient   Certification Statement: The patient will continue to require additional inpatient hospital stay due to IV Rx, close monitroing  Discharge Plan: Anticipate discharge in 24-48 hrs to rehab facility.    Code Status: Level 1 - Full Code    Subjective   Patient unable to provide history, no overnight event reported.    Objective :  Temp:  [97.4 °F (36.3 °C)-99 °F (37.2 °C)] 99 °F (37.2 °C)  HR:  [71-86] 86  BP: (91-97)/(60) 97/60  Resp:  [16] 16  SpO2:  [96 %-99 %] 97 %  O2 Device: None (Room air)    Body mass index is 18.54 kg/m².     Input and Output Summary (last 24 hours):     Intake/Output Summary (Last 24 hours) at 11/7/2024 1512  Last data filed at 11/7/2024 0001  Gross per 24 hour   Intake 1637 ml   Output 0 ml   Net 1637 ml       Physical Exam  Constitutional:       General: She is not in acute distress.     Appearance: She is ill-appearing.   HENT:      Head: Normocephalic and atraumatic.   Eyes:      Conjunctiva/sclera: Conjunctivae normal.   Cardiovascular:      Rate and Rhythm: Normal rate and regular rhythm.   Pulmonary:      Effort: No respiratory distress.   Abdominal:      General: There is no distension.      Tenderness: There is no abdominal tenderness. There is no guarding.      Comments: PEG tube   Musculoskeletal:      Right lower leg: No edema.      Left lower leg: No edema.    Skin:     General: Skin is warm and dry.   Neurological:      Mental Status: Mental status is at baseline.   Psychiatric:         Speech: She is noncommunicative.         Lines/Drains:  Lines/Drains/Airways       Active Status       Name Placement date Placement time Site Days    Gastrostomy/Enterostomy Percutaneous Interventional Gastrostomy 16 Fr. LUQ 09/26/24  1237  LUQ  42                            Lab Results: I have reviewed the following results:   Results from last 7 days   Lab Units 11/07/24  0442   WBC Thousand/uL 8.48   HEMOGLOBIN g/dL 10.0*   HEMATOCRIT % 31.5*   PLATELETS Thousands/uL 184   SEGS PCT % 61   LYMPHO PCT % 26   MONO PCT % 9   EOS PCT % 3     Results from last 7 days   Lab Units 11/07/24  0553 11/06/24  0618   SODIUM mmol/L 138 140   POTASSIUM mmol/L 3.6 3.2*   CHLORIDE mmol/L 115* 109*   CO2 mmol/L 17* 22   BUN mg/dL 13 16   CREATININE mg/dL 0.37* 0.52*   ANION GAP mmol/L 6 9   CALCIUM mg/dL 8.5 9.1   ALBUMIN g/dL  --  4.0   TOTAL BILIRUBIN mg/dL  --  0.50   ALK PHOS U/L  --  83   ALT U/L  --  29   AST U/L  --  17   GLUCOSE RANDOM mg/dL 139 134     Results from last 7 days   Lab Units 11/05/24  2248   INR  1.02             Results from last 7 days   Lab Units 11/07/24  0553 11/05/24  2248   LACTIC ACID mmol/L  --  1.0   PROCALCITONIN ng/ml 0.07 0.13       Recent Cultures (last 7 days):   Results from last 7 days   Lab Units 11/06/24  0034 11/05/24  2248   BLOOD CULTURE  No Growth at 24 hrs. No Growth at 24 hrs.       No new imaging      Last 24 Hours Medication List:     Current Facility-Administered Medications:     acetaminophen (TYLENOL) tablet 650 mg, Q4H PRN    azithromycin (ZITHROMAX) 500 mg in sodium chloride 0.9 % 250 mL IVPB, Q24H, Last Rate: 500 mg (11/07/24 0425)    bisacodyl (DULCOLAX) rectal suppository 10 mg, Daily    Brivaracetam (BRIVIACT) oral solution 100 mg, BID    cefTRIAXone (ROCEPHIN) IVPB (premix in dextrose) 1,000 mg 50 mL, Q24H, Last Rate: 1,000 mg (11/06/24  2324)    cloBAZam (ONFI) tablet 10 mg, Q12H NANDO    diazepam (VALIUM) tablet 2 mg, Q6H PRN    enoxaparin (LOVENOX) subcutaneous injection 40 mg, Daily    guaiFENesin (ROBITUSSIN) oral liquid 200 mg, Q4H PRN    lacosamide (VIMPAT) tablet 200 mg, Q12H NANDO    magnesium hydroxide (MILK OF MAGNESIA) oral suspension 30 mL, Daily PRN    Rufinamide SUSP 1,600 mg, Q12H NANDO    topiramate (TOPAMAX) tablet 250 mg, Q12H NANDO    Administrative Statements   Today, Patient Was Seen By: Dasia Castillo MD      **Please Note: This note may have been constructed using a voice recognition system.**

## 2024-11-07 NOTE — PLAN OF CARE
Problem: Prexisting or High Potential for Compromised Skin Integrity  Goal: Skin integrity is maintained or improved  Description: INTERVENTIONS:  - Identify patients at risk for skin breakdown  - Assess and monitor skin integrity  - Assess and monitor nutrition and hydration status  - Monitor labs   - Assess for incontinence   - Turn and reposition patient  - Assist with mobility/ambulation  - Relieve pressure over bony prominences  - Avoid friction and shearing  - Provide appropriate hygiene as needed including keeping skin clean and dry  - Evaluate need for skin moisturizer/barrier cream  - Collaborate with interdisciplinary team   - Patient/family teaching  - Consider wound care consult   Outcome: Progressing     Problem: PAIN - ADULT  Goal: Verbalizes/displays adequate comfort level or baseline comfort level  Description: Interventions:  - Encourage patient to monitor pain and request assistance  - Assess pain using appropriate pain scale  - Administer analgesics based on type and severity of pain and evaluate response  - Implement non-pharmacological measures as appropriate and evaluate response  - Consider cultural and social influences on pain and pain management  - Notify physician/advanced practitioner if interventions unsuccessful or patient reports new pain  Outcome: Progressing     Problem: INFECTION - ADULT  Goal: Absence or prevention of progression during hospitalization  Description: INTERVENTIONS:  - Assess and monitor for signs and symptoms of infection  - Monitor lab/diagnostic results  - Monitor all insertion sites, i.e. indwelling lines, tubes, and drains  - Monitor endotracheal if appropriate and nasal secretions for changes in amount and color  - [place] appropriate cooling/warming therapies per order  - Administer medications as ordered  - Instruct and encourage patient and family to use good hand hygiene technique  - Identify and instruct in appropriate isolation precautions for  identified infection/condition  Outcome: Progressing     Problem: SAFETY ADULT  Goal: Patient will remain free of falls  Description: INTERVENTIONS:  - Educate patient/family on patient safety including physical limitations  - Instruct patient to call for assistance with activity   - Consult OT/PT to assist with strengthening/mobility   - Keep Call bell within reach  - Keep bed low and locked with side rails adjusted as appropriate  - Keep care items and personal belongings within reach  - Initiate and maintain comfort rounds  - Make Fall Risk Sign visible to staff  - Offer Toileting every 2 Hours, in advance of need  - Initiate/Maintain bed alarm  - Obtain necessary fall risk management equipment: bed alarm  - Apply yellow socks and bracelet for high fall risk patients  - Consider moving patient to room near nurses station  Outcome: Progressing  Goal: Maintain or return to baseline ADL function  Description: INTERVENTIONS:  -  Assess patient's ability to carry out ADLs; assess patient's baseline for ADL function and identify physical deficits which impact ability to perform ADLs (bathing, care of mouth/teeth, toileting, grooming, dressing, etc.)  - Assess/evaluate cause of self-care deficits   - Assess range of motion  - Assess patient's mobility; develop plan if impaired  - Assess patient's need for assistive devices and provide as appropriate  - Encourage maximum independence but intervene and supervise when necessary  - Involve family in performance of ADLs  - Assess for home care needs following discharge   - Consider OT consult to assist with ADL evaluation and planning for discharge  - Provide patient education as appropriate  Outcome: Progressing  Goal: Maintains/Returns to pre admission functional level  Description: INTERVENTIONS:  - Perform AM-PAC 6 Click Basic Mobility/ Daily Activity assessment daily.  - Set and communicate daily mobility goal to care team and patient/family/caregiver.   - Collaborate  with rehabilitation services on mobility goals if consulted  - Perform Range of Motion 3 times a day.  - Reposition patient every 2 hours.  - Dangle patient 3 times a day  - Stand patient 3 times a day  - Ambulate patient 3 times a day  - Out of bed to chair 3 times a day   - Out of bed for meals 3 times a day  - Out of bed for toileting  - Record patient progress and toleration of activity level   Outcome: Progressing     Problem: DISCHARGE PLANNING  Goal: Discharge to home or other facility with appropriate resources  Description: INTERVENTIONS:  - Identify barriers to discharge w/patient and caregiver  - Arrange for needed discharge resources and transportation as appropriate  - Identify discharge learning needs (meds, wound care, etc.)  - Arrange for interpretive services to assist at discharge as needed  - Refer to Case Management Department for coordinating discharge planning if the patient needs post-hospital services based on physician/advanced practitioner order or complex needs related to functional status, cognitive ability, or social support system  Outcome: Progressing     Problem: Knowledge Deficit  Goal: Patient/family/caregiver demonstrates understanding of disease process, treatment plan, medications, and discharge instructions  Description: Complete learning assessment and assess knowledge base.  Interventions:  - Provide teaching at level of understanding  - Provide teaching via preferred learning methods  Outcome: Progressing     Problem: Nutrition/Hydration-ADULT  Goal: Nutrient/Hydration intake appropriate for improving, restoring or maintaining nutritional needs  Description: Monitor and assess patient's nutrition/hydration status for malnutrition. Collaborate with interdisciplinary team and initiate plan and interventions as ordered.  Monitor patient's weight and dietary intake as ordered or per policy. Utilize nutrition screening tool and intervene as necessary. Determine patient's food  preferences and provide high-protein, high-caloric foods as appropriate.     INTERVENTIONS:  - Monitor oral intake, urinary output, labs, and treatment plans  - Assess nutrition and hydration status and recommend course of action  - Evaluate amount of meals eaten  - Assist patient with eating if necessary   - Allow adequate time for meals  - Recommend/ encourage appropriate diets, oral nutritional supplements, and vitamin/mineral supplements  - Order, calculate, and assess calorie counts as needed  - Recommend, monitor, and adjust tube feedings and TPN/PPN based on assessed needs  - Assess need for intravenous fluids  - Provide specific nutrition/hydration education as appropriate  - Include patient/family/caregiver in decisions related to nutrition  Outcome: Progressing     Problem: CARDIOVASCULAR - ADULT  Goal: Maintains optimal cardiac output and hemodynamic stability  Description: INTERVENTIONS:  - Monitor I/O, vital signs and rhythm  - Monitor for S/S and trends of decreased cardiac output  - Administer and titrate ordered vasoactive medications to optimize hemodynamic stability  - Assess quality of pulses, skin color and temperature  - Assess for signs of decreased coronary artery perfusion  - Instruct patient to report change in severity of symptoms  Outcome: Progressing     Problem: RESPIRATORY - ADULT  Goal: Achieves optimal ventilation and oxygenation  Description: INTERVENTIONS:  - Assess for changes in respiratory status  - Assess for changes in mentation and behavior  - Position to facilitate oxygenation and minimize respiratory effort  - Oxygen administered by appropriate delivery if ordered  - Initiate smoking cessation education as indicated  - Encourage broncho-pulmonary hygiene including cough, deep breathe, Incentive Spirometry  - Assess the need for suctioning and aspirate as needed  - Assess and instruct to report SOB or any respiratory difficulty  - Respiratory Therapy support as  indicated  Outcome: Progressing     Problem: METABOLIC, FLUID AND ELECTROLYTES - ADULT  Goal: Electrolytes maintained within normal limits  Description: INTERVENTIONS:  - Monitor labs and assess patient for signs and symptoms of electrolyte imbalances  - Administer electrolyte replacement as ordered  - Monitor response to electrolyte replacements, including repeat lab results as appropriate  - Instruct patient on fluid and nutrition as appropriate  Outcome: Progressing

## 2024-11-08 LAB
ANION GAP SERPL CALCULATED.3IONS-SCNC: 9 MMOL/L (ref 4–13)
BASOPHILS # BLD AUTO: 0.05 THOUSANDS/ÂΜL (ref 0–0.1)
BASOPHILS NFR BLD AUTO: 0 % (ref 0–1)
BUN SERPL-MCNC: 10 MG/DL (ref 5–25)
CALCIUM SERPL-MCNC: 9.2 MG/DL (ref 8.4–10.2)
CHLORIDE SERPL-SCNC: 110 MMOL/L (ref 96–108)
CO2 SERPL-SCNC: 19 MMOL/L (ref 21–32)
CREAT SERPL-MCNC: 0.39 MG/DL (ref 0.6–1.3)
EOSINOPHIL # BLD AUTO: 0.19 THOUSAND/ÂΜL (ref 0–0.61)
EOSINOPHIL NFR BLD AUTO: 2 % (ref 0–6)
ERYTHROCYTE [DISTWIDTH] IN BLOOD BY AUTOMATED COUNT: 12.6 % (ref 11.6–15.1)
GFR SERPL CREATININE-BSD FRML MDRD: 129 ML/MIN/1.73SQ M
GLUCOSE SERPL-MCNC: 108 MG/DL (ref 65–140)
HCT VFR BLD AUTO: 32.8 % (ref 34.8–46.1)
HGB BLD-MCNC: 10.6 G/DL (ref 11.5–15.4)
IMM GRANULOCYTES # BLD AUTO: 0.03 THOUSAND/UL (ref 0–0.2)
IMM GRANULOCYTES NFR BLD AUTO: 0 % (ref 0–2)
LYMPHOCYTES # BLD AUTO: 1.59 THOUSANDS/ÂΜL (ref 0.6–4.47)
LYMPHOCYTES NFR BLD AUTO: 14 % (ref 14–44)
MCH RBC QN AUTO: 31.5 PG (ref 26.8–34.3)
MCHC RBC AUTO-ENTMCNC: 32.3 G/DL (ref 31.4–37.4)
MCV RBC AUTO: 98 FL (ref 82–98)
MONOCYTES # BLD AUTO: 0.95 THOUSAND/ÂΜL (ref 0.17–1.22)
MONOCYTES NFR BLD AUTO: 9 % (ref 4–12)
NEUTROPHILS # BLD AUTO: 8.43 THOUSANDS/ÂΜL (ref 1.85–7.62)
NEUTS SEG NFR BLD AUTO: 75 % (ref 43–75)
NRBC BLD AUTO-RTO: 0 /100 WBCS
PLATELET # BLD AUTO: 213 THOUSANDS/UL (ref 149–390)
PMV BLD AUTO: 12.2 FL (ref 8.9–12.7)
POTASSIUM SERPL-SCNC: 3.6 MMOL/L (ref 3.5–5.3)
RBC # BLD AUTO: 3.36 MILLION/UL (ref 3.81–5.12)
SODIUM SERPL-SCNC: 138 MMOL/L (ref 135–147)
WBC # BLD AUTO: 11.24 THOUSAND/UL (ref 4.31–10.16)

## 2024-11-08 PROCEDURE — 80048 BASIC METABOLIC PNL TOTAL CA: CPT | Performed by: FAMILY MEDICINE

## 2024-11-08 PROCEDURE — 85025 COMPLETE CBC W/AUTO DIFF WBC: CPT | Performed by: FAMILY MEDICINE

## 2024-11-08 PROCEDURE — 99232 SBSQ HOSP IP/OBS MODERATE 35: CPT | Performed by: FAMILY MEDICINE

## 2024-11-08 RX ADMIN — TOPIRAMATE 250 MG: 100 TABLET, FILM COATED ORAL at 09:48

## 2024-11-08 RX ADMIN — ENOXAPARIN SODIUM 40 MG: 40 INJECTION SUBCUTANEOUS at 09:47

## 2024-11-08 RX ADMIN — BRIVARACETAM 100 MG: 10 SOLUTION ORAL at 17:53

## 2024-11-08 RX ADMIN — TOPIRAMATE 250 MG: 100 TABLET, FILM COATED ORAL at 20:45

## 2024-11-08 RX ADMIN — BISACODYL 10 MG: 10 SUPPOSITORY RECTAL at 09:48

## 2024-11-08 RX ADMIN — CLOBAZAM 10 MG: 10 TABLET ORAL at 09:48

## 2024-11-08 RX ADMIN — LACOSAMIDE 200 MG: 50 TABLET, FILM COATED ORAL at 20:45

## 2024-11-08 RX ADMIN — CLOBAZAM 10 MG: 10 TABLET ORAL at 20:45

## 2024-11-08 RX ADMIN — CEFTRIAXONE 1000 MG: 1 INJECTION, SOLUTION INTRAVENOUS at 00:01

## 2024-11-08 RX ADMIN — CEFTRIAXONE 1000 MG: 1 INJECTION, SOLUTION INTRAVENOUS at 23:03

## 2024-11-08 RX ADMIN — BRIVARACETAM 100 MG: 10 SOLUTION ORAL at 09:47

## 2024-11-08 RX ADMIN — AZITHROMYCIN MONOHYDRATE 500 MG: 500 INJECTION, POWDER, LYOPHILIZED, FOR SOLUTION INTRAVENOUS at 04:16

## 2024-11-08 RX ADMIN — RUFINAMIDE 1600 MG: 40 SUSPENSION ORAL at 09:49

## 2024-11-08 RX ADMIN — LACOSAMIDE 200 MG: 50 TABLET, FILM COATED ORAL at 09:48

## 2024-11-08 RX ADMIN — RUFINAMIDE 1600 MG: 40 SUSPENSION ORAL at 20:46

## 2024-11-08 NOTE — PLAN OF CARE
Problem: Prexisting or High Potential for Compromised Skin Integrity  Goal: Skin integrity is maintained or improved  Description: INTERVENTIONS:  - Identify patients at risk for skin breakdown  - Assess and monitor skin integrity  - Assess and monitor nutrition and hydration status  - Monitor labs   - Assess for incontinence   - Turn and reposition patient  - Assist with mobility/ambulation  - Relieve pressure over bony prominences  - Avoid friction and shearing  - Provide appropriate hygiene as needed including keeping skin clean and dry  - Evaluate need for skin moisturizer/barrier cream  - Collaborate with interdisciplinary team   - Patient/family teaching  - Consider wound care consult   Outcome: Progressing     Problem: PAIN - ADULT  Goal: Verbalizes/displays adequate comfort level or baseline comfort level  Description: Interventions:  - Encourage patient to monitor pain and request assistance  - Assess pain using appropriate pain scale  - Administer analgesics based on type and severity of pain and evaluate response  - Implement non-pharmacological measures as appropriate and evaluate response  - Consider cultural and social influences on pain and pain management  - Notify physician/advanced practitioner if interventions unsuccessful or patient reports new pain  Outcome: Progressing     Problem: INFECTION - ADULT  Goal: Absence or prevention of progression during hospitalization  Description: INTERVENTIONS:  - Assess and monitor for signs and symptoms of infection  - Monitor lab/diagnostic results  - Monitor all insertion sites, i.e. indwelling lines, tubes, and drains  - Monitor endotracheal if appropriate and nasal secretions for changes in amount and color  - [place] appropriate cooling/warming therapies per order  - Administer medications as ordered  - Instruct and encourage patient and family to use good hand hygiene technique  - Identify and instruct in appropriate isolation precautions for  identified infection/condition  Outcome: Progressing     Problem: SAFETY ADULT  Goal: Patient will remain free of falls  Description: INTERVENTIONS:  - Educate patient/family on patient safety including physical limitations  - Instruct patient to call for assistance with activity   - Consult OT/PT to assist with strengthening/mobility   - Keep Call bell within reach  - Keep bed low and locked with side rails adjusted as appropriate  - Keep care items and personal belongings within reach  - Initiate and maintain comfort rounds  - Make Fall Risk Sign visible to staff  - Offer Toileting every 2 Hours, in advance of need  - Initiate/Maintain bed alarm  - Obtain necessary fall risk management equipment:   - Apply yellow socks and bracelet for high fall risk patients  - Consider moving patient to room near nurses station  Outcome: Progressing  Goal: Maintain or return to baseline ADL function  Description: INTERVENTIONS:  -  Assess patient's ability to carry out ADLs; assess patient's baseline for ADL function and identify physical deficits which impact ability to perform ADLs (bathing, care of mouth/teeth, toileting, grooming, dressing, etc.)  - Assess/evaluate cause of self-care deficits   - Assess range of motion  - Assess patient's mobility; develop plan if impaired  - Assess patient's need for assistive devices and provide as appropriate  - Encourage maximum independence but intervene and supervise when necessary  - Involve family in performance of ADLs  - Assess for home care needs following discharge   - Consider OT consult to assist with ADL evaluation and planning for discharge  - Provide patient education as appropriate  Outcome: Progressing  Goal: Maintains/Returns to pre admission functional level  Description: INTERVENTIONS:  - Perform AM-PAC 6 Click Basic Mobility/ Daily Activity assessment daily.  - Set and communicate daily mobility goal to care team and patient/family/caregiver.   - Collaborate with  rehabilitation services on mobility goals if consulted  - Perform Range of Motion 4 times a day.  - Reposition patient every 2 hours.  - Dangle patient 3 times a day  - Stand patient 3 times a day  - Ambulate patient 3 times a day  - Out of bed to chair 3 times a day   - Out of bed for meals 3 times a day  - Out of bed for toileting  - Record patient progress and toleration of activity level   Outcome: Progressing

## 2024-11-08 NOTE — ASSESSMENT & PLAN NOTE
As noted on CT imaging  Likely, source of patient's presentation with SIRS, suspect aspiration  Patient remains on room air and appears clinically improved  Proceed with Rocephin/Zithromax  Patient incontinent of urine - unable to check for strep/Legionella antigen  Unable to obtain sputum culture thus far  Anticipate discharge in 24 to 48 hours and transitioning to antibiotics via PEG tube

## 2024-11-08 NOTE — CASE MANAGEMENT
Case Management Discharge Planning Note    Patient name Maira Bull  Location /415-01 MRN [id number]  : 1980 Date 2024       Current Admission Date: 2024  Current Admission Diagnosis:Sepsis without septic shock (HCC)   Patient Active Problem List    Diagnosis Date Noted Date Diagnosed    Hypotension 2024     Fracture of tooth 2024     PEG tube malfunction (HCC) 2020     Labyrinthine disorder 03/10/2020     Intellectual disability with epilepsy (HCC) 12/10/2019     Fatty infiltration of liver 2019     Dislodged gastrostomy tube 2019     S/P placement of VNS (vagus nerve stimulation) device 2019     Breast mass 01/15/2019     Sedated due to multiple medications 01/10/2019     Hypophosphatemia 2018     Right atrial enlargement 10/13/2018     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     Sepsis without septic shock (HCC) 10/09/2018     Skin breakdown 10/09/2018     Incontinence 2018     Somnolence 2018     Nonintractable Lennox-Gastaut syndrome without status epilepticus (HCC) 2018     Labial cyst 2017     Positive blood culture 2017     Multifocal pneumonia 2017     Osteoporosis 2013     Onychomycosis 2013     Hyperkeratosis 2013     Cerebral anoxic injury (HCC) 2013       LOS (days): 2  Geometric Mean LOS (GMLOS) (days): 4.9  Days to GMLOS:2.5     OBJECTIVE:  Risk of Unplanned Readmission Score: 20.17         Current admission status: Inpatient   Preferred Pharmacy:   [facility] [place], [id number], [address], [place]  [address] [facility] [address]  Phone: [phone] Fax: [phone]    [facility] - [place], [place] - [address]  [address]  [address]  [facility] [address]  Phone: [phone] Fax: [phone]    [facility] [place] - [address]  [address] [facility] [place] [address]  Phone:  [phone] Fax: [phone]    [facility], [place] - [address]  [address] [facility] [address]  Phone: [phone] Fax: [phone]    [facility] [place] - [address]  [address]  [place] [address]  Phone: [phone] Fax: [phone]    [id number] [place] - [address]  [address] [facility] [place] [address]  Phone: [phone] Fax: [phone]    Primary Care Provider: Doe Miller DO    Primary Insurance: MEDICARE  Secondary Insurance: [facility]    DISCHARGE DETAILS:    Discharge planning discussed with:: Aleksander Bull:Brother  Contacts  Patient Contacts: Aleksander Bull:Brother  Contact Method: Phone  Phone Number: [phone]  Reason/Outcome: Discharge Planning          Discharge Destination Plan:: SNF ([facility])  Transport at Discharge : [facility]        Transported by (Company and Unit #): [employer]  ETA of Transport (Date): 11/09/24  ETA of Transport (Time): 1200

## 2024-11-08 NOTE — ASSESSMENT & PLAN NOTE
Patient presented (11/5) from facility with reports of malfunctioning PEG tube  Patient nonverbal secondary to anoxic brain injury at baseline  History obtained from medical staff and record    In ED, patient incidentally noted with fever, tachycardia, tachypnea, leukocytosis-without lactic acidosis or hemodynamic compromise-MAP maintained greater than 65  Prompted infectious workup-UA negative, CT chest abdomen pelvis obtained which was suggestive of multifocal lung infiltrates suspected infectious/bacterial in origin  Integument intact  Received fluids/Rocephin in ED    Appears to be pulmonary source given evidence of multifocal pneumonia on CT imaging  Blood cultures - no growth to date  Proceed with empiric antimicrobial coverage with presumed pulmonary source-Rocephin/Zithromax  Monitor CBC, VS  Narrow antimicrobials for culture/sensitivity if able to obtain culture data

## 2024-11-08 NOTE — PROGRESS NOTES
Progress Note - Hospitalist   Name: Maira Bull 44 y.o. female I MRN: [id number]  Unit/Bed#: 415-01 I Date of Admission: 11/5/2024   Date of Service: 11/8/2024 I Hospital Day: 2    Assessment & Plan  Sepsis without septic shock (HCC)  Patient presented (11/5) from facility with reports of malfunctioning PEG tube  Patient nonverbal secondary to anoxic brain injury at baseline  History obtained from medical staff and record    In ED, patient incidentally noted with fever, tachycardia, tachypnea, leukocytosis-without lactic acidosis or hemodynamic compromise-MAP maintained greater than 65  Prompted infectious workup-UA negative, CT chest abdomen pelvis obtained which was suggestive of multifocal lung infiltrates suspected infectious/bacterial in origin  Integument intact  Received fluids/Rocephin in ED    Appears to be pulmonary source given evidence of multifocal pneumonia on CT imaging  Blood cultures - no growth to date  Proceed with empiric antimicrobial coverage with presumed pulmonary source-Rocephin/Zithromax  Monitor CBC, VS  Narrow antimicrobials for culture/sensitivity if able to obtain culture data    Multifocal pneumonia  As noted on CT imaging  Likely, source of patient's presentation with SIRS, suspect aspiration  Patient remains on room air and appears clinically improved  Proceed with Rocephin/Zithromax  Patient incontinent of urine - unable to check for strep/Legionella antigen  Unable to obtain sputum culture thus far  Anticipate discharge in 24 to 48 hours and transitioning to antibiotics via PEG tube  Nonintractable Lennox-Gastaut syndrome without status epilepticus (HCC)  Chronic condition/stable  Recently hospitalized secondary to intractable seizures-followed by neurology and was transferred to [facility]-antiepileptic regimen optimized and as follows:  Briviact increased to 100 mg BID   clobazam 10 mg BID  lacosamide 200 mg twice daily  rufinamide 1600 mg twice daily  topiramate 250  mg twice daily   Epidiolex 500 mg BID  Seizure precautions  Epidiolex (cannabinoid) not available on formulary, other medications continued      VTE Pharmacologic Prophylaxis:    Lovenox    Mobility:   Basic Mobility Inpatient Raw Score: 8  -HLM Goal: 3: Sit at edge of bed  JH-HLM Achieved: 2: Bed activities/Dependent transfer  JH-HLM Goal NOT achieved. Continue with multidisciplinary rounding and encourage appropriate mobility to improve upon JH-HLM goals.    Patient Centered Rounds: I performed bedside rounds with nursing staff today.   Discussions with Specialists or Other Care Team Provider: CM    Education and Discussions with Family / Patient: ongoing update of father    Current Length of Stay: 2 day(s)  Current Patient Status: Inpatient   Certification Statement: IV Rx, close monitoring  Discharge Plan: Anticipate discharge in 24-48 hrs to rehab facility.    Code Status: Level 1 - Full Code    Subjective   Patient unable to provide history.  Has been sleeping since this morning.    Objective :  Temp:  [98.7 °F (37.1 °C)-100 °F (37.8 °C)] 99.2 °F (37.3 °C)  HR:  [74-92] 74  BP: ()/(60-69) 90/61  SpO2:  [97 %-99 %] 97 %  O2 Device: None (Room air)    Body mass index is 18.54 kg/m².     Input and Output Summary (last 24 hours):     Intake/Output Summary (Last 24 hours) at 11/8/2024 1313  Last data filed at 11/8/2024 0001  Gross per 24 hour   Intake 967 ml   Output --   Net 967 ml       Physical Exam  Constitutional:       General: She is not in acute distress.  HENT:      Head: Normocephalic and atraumatic.   Eyes:      Conjunctiva/sclera: Conjunctivae normal.   Cardiovascular:      Rate and Rhythm: Normal rate and regular rhythm.   Pulmonary:      Effort: No respiratory distress.      Breath sounds: No wheezing or rales.   Abdominal:      General: There is no distension.      Tenderness: There is no abdominal tenderness. There is no guarding.   Musculoskeletal:      Right lower leg: No edema.      Left  lower leg: No edema.   Skin:     General: Skin is warm and dry.   Neurological:      Mental Status: Mental status is at baseline.         Lines/Drains:  Lines/Drains/Airways       Active Status       Name Placement date Placement time Site Days    Gastrostomy/Enterostomy Percutaneous Interventional Gastrostomy 16 Fr. LUQ 09/26/24  1237  LUQ  43                            Lab Results: I have reviewed the following results:   Results from last 7 days   Lab Units 11/08/24  0425   WBC Thousand/uL 11.24*   HEMOGLOBIN g/dL 10.6*   HEMATOCRIT % 32.8*   PLATELETS Thousands/uL 213   SEGS PCT % 75   LYMPHO PCT % 14   MONO PCT % 9   EOS PCT % 2     Results from last 7 days   Lab Units 11/08/24  0425 11/07/24  0553 11/06/24  0618   SODIUM mmol/L 138   < > 140   POTASSIUM mmol/L 3.6   < > 3.2*   CHLORIDE mmol/L 110*   < > 109*   CO2 mmol/L 19*   < > 22   BUN mg/dL 10   < > 16   CREATININE mg/dL 0.39*   < > 0.52*   ANION GAP mmol/L 9   < > 9   CALCIUM mg/dL 9.2   < > 9.1   ALBUMIN g/dL  --   --  4.0   TOTAL BILIRUBIN mg/dL  --   --  0.50   ALK PHOS U/L  --   --  83   ALT U/L  --   --  29   AST U/L  --   --  17   GLUCOSE RANDOM mg/dL 108   < > 134    < > = values in this interval not displayed.     Results from last 7 days   Lab Units 11/05/24  2248   INR  1.02             Results from last 7 days   Lab Units 11/07/24  0553 11/05/24 2248   LACTIC ACID mmol/L  --  1.0   PROCALCITONIN ng/ml 0.07 0.13       Recent Cultures (last 7 days):   Results from last 7 days   Lab Units 11/06/24  0034 11/05/24 2248   BLOOD CULTURE  No Growth at 48 hrs. No Growth at 48 hrs.       No new imaging      Last 24 Hours Medication List:     Current Facility-Administered Medications:     acetaminophen (TYLENOL) tablet 650 mg, Q4H PRN    azithromycin (ZITHROMAX) 500 mg in sodium chloride 0.9 % 250 mL IVPB, Q24H, Last Rate: 500 mg (11/08/24 0416)    bisacodyl (DULCOLAX) rectal suppository 10 mg, Daily    Brivaracetam (BRIVIACT) oral solution 100 mg,  BID    cefTRIAXone (ROCEPHIN) IVPB (premix in dextrose) 1,000 mg 50 mL, Q24H, Last Rate: 1,000 mg (11/08/24 0001)    cloBAZam (ONFI) tablet 10 mg, Q12H NANDO    diazepam (VALIUM) tablet 2 mg, Q6H PRN    enoxaparin (LOVENOX) subcutaneous injection 40 mg, Daily    guaiFENesin (ROBITUSSIN) oral liquid 200 mg, Q4H PRN    lacosamide (VIMPAT) tablet 200 mg, Q12H NANDO    magnesium hydroxide (MILK OF MAGNESIA) oral suspension 30 mL, Daily PRN    Rufinamide SUSP 1,600 mg, Q12H NANDO    topiramate (TOPAMAX) tablet 250 mg, Q12H NANDO    Administrative Statements   Today, Patient Was Seen By: Dasia Castillo MD      **Please Note: This note may have been constructed using a voice recognition system.**

## 2024-11-08 NOTE — ASSESSMENT & PLAN NOTE
Chronic condition/stable  Recently hospitalized secondary to intractable seizures-followed by neurology and was transferred to [facility]-antiepileptic regimen optimized and as follows:  Briviact increased to 100 mg BID   clobazam 10 mg BID  lacosamide 200 mg twice daily  rufinamide 1600 mg twice daily  topiramate 250 mg twice daily   Epidiolex 500 mg BID  Seizure precautions  Epidiolex (cannabinoid) not available on formulary, other medications continued

## 2024-11-09 VITALS
BODY MASS INDEX: 18.52 KG/M2 | WEIGHT: 98.11 LBS | SYSTOLIC BLOOD PRESSURE: 106 MMHG | RESPIRATION RATE: 14 BRPM | HEIGHT: 61 IN | OXYGEN SATURATION: 98 % | DIASTOLIC BLOOD PRESSURE: 72 MMHG | TEMPERATURE: 97.8 F | HEART RATE: 79 BPM

## 2024-11-09 LAB
ANION GAP SERPL CALCULATED.3IONS-SCNC: 10 MMOL/L (ref 4–13)
BASOPHILS # BLD AUTO: 0.06 THOUSANDS/ÂΜL (ref 0–0.1)
BASOPHILS NFR BLD AUTO: 1 % (ref 0–1)
BUN SERPL-MCNC: 14 MG/DL (ref 5–25)
CALCIUM SERPL-MCNC: 9.3 MG/DL (ref 8.4–10.2)
CHLORIDE SERPL-SCNC: 110 MMOL/L (ref 96–108)
CO2 SERPL-SCNC: 19 MMOL/L (ref 21–32)
CREAT SERPL-MCNC: 0.38 MG/DL (ref 0.6–1.3)
EOSINOPHIL # BLD AUTO: 0.36 THOUSAND/ÂΜL (ref 0–0.61)
EOSINOPHIL NFR BLD AUTO: 5 % (ref 0–6)
ERYTHROCYTE [DISTWIDTH] IN BLOOD BY AUTOMATED COUNT: 12.7 % (ref 11.6–15.1)
GFR SERPL CREATININE-BSD FRML MDRD: 130 ML/MIN/1.73SQ M
GLUCOSE SERPL-MCNC: 129 MG/DL (ref 65–140)
HCT VFR BLD AUTO: 35.2 % (ref 34.8–46.1)
HGB BLD-MCNC: 11.2 G/DL (ref 11.5–15.4)
IMM GRANULOCYTES # BLD AUTO: 0.02 THOUSAND/UL (ref 0–0.2)
IMM GRANULOCYTES NFR BLD AUTO: 0 % (ref 0–2)
LYMPHOCYTES # BLD AUTO: 1.94 THOUSANDS/ÂΜL (ref 0.6–4.47)
LYMPHOCYTES NFR BLD AUTO: 28 % (ref 14–44)
MCH RBC QN AUTO: 31.8 PG (ref 26.8–34.3)
MCHC RBC AUTO-ENTMCNC: 31.8 G/DL (ref 31.4–37.4)
MCV RBC AUTO: 100 FL (ref 82–98)
MONOCYTES # BLD AUTO: 0.47 THOUSAND/ÂΜL (ref 0.17–1.22)
MONOCYTES NFR BLD AUTO: 7 % (ref 4–12)
NEUTROPHILS # BLD AUTO: 3.97 THOUSANDS/ÂΜL (ref 1.85–7.62)
NEUTS SEG NFR BLD AUTO: 59 % (ref 43–75)
NRBC BLD AUTO-RTO: 0 /100 WBCS
PLATELET # BLD AUTO: 219 THOUSANDS/UL (ref 149–390)
PMV BLD AUTO: 11.8 FL (ref 8.9–12.7)
POTASSIUM SERPL-SCNC: 3.6 MMOL/L (ref 3.5–5.3)
PROCALCITONIN SERPL-MCNC: 0.06 NG/ML
RBC # BLD AUTO: 3.52 MILLION/UL (ref 3.81–5.12)
SODIUM SERPL-SCNC: 139 MMOL/L (ref 135–147)
WBC # BLD AUTO: 6.82 THOUSAND/UL (ref 4.31–10.16)

## 2024-11-09 PROCEDURE — 99239 HOSP IP/OBS DSCHRG MGMT >30: CPT | Performed by: FAMILY MEDICINE

## 2024-11-09 PROCEDURE — 85025 COMPLETE CBC W/AUTO DIFF WBC: CPT | Performed by: FAMILY MEDICINE

## 2024-11-09 PROCEDURE — 80048 BASIC METABOLIC PNL TOTAL CA: CPT | Performed by: FAMILY MEDICINE

## 2024-11-09 PROCEDURE — 84145 PROCALCITONIN (PCT): CPT | Performed by: FAMILY MEDICINE

## 2024-11-09 RX ORDER — AZITHROMYCIN 500 MG/1
500 TABLET, FILM COATED ORAL DAILY
Start: 2024-11-10 | End: 2024-11-11

## 2024-11-09 RX ORDER — CEFUROXIME AXETIL 500 MG/1
500 TABLET ORAL EVERY 12 HOURS SCHEDULED
Start: 2024-11-09 | End: 2024-11-12

## 2024-11-09 RX ADMIN — BISACODYL 10 MG: 10 SUPPOSITORY RECTAL at 08:52

## 2024-11-09 RX ADMIN — TOPIRAMATE 250 MG: 100 TABLET, FILM COATED ORAL at 08:51

## 2024-11-09 RX ADMIN — ENOXAPARIN SODIUM 40 MG: 40 INJECTION SUBCUTANEOUS at 08:50

## 2024-11-09 RX ADMIN — BRIVARACETAM 100 MG: 10 SOLUTION ORAL at 08:52

## 2024-11-09 RX ADMIN — LACOSAMIDE 200 MG: 50 TABLET, FILM COATED ORAL at 08:50

## 2024-11-09 RX ADMIN — AZITHROMYCIN MONOHYDRATE 500 MG: 500 INJECTION, POWDER, LYOPHILIZED, FOR SOLUTION INTRAVENOUS at 03:07

## 2024-11-09 RX ADMIN — RUFINAMIDE 1600 MG: 40 SUSPENSION ORAL at 08:52

## 2024-11-09 RX ADMIN — CLOBAZAM 10 MG: 10 TABLET ORAL at 08:50

## 2024-11-09 NOTE — ASSESSMENT & PLAN NOTE
Chronic condition/stable  Recently hospitalized secondary to intractable seizures-followed by neurology and was transferred to [facility]-antiepileptic regimen optimized and as follows:  Briviact increased to 100 mg BID   clobazam 10 mg BID  lacosamide 200 mg twice daily  rufinamide 1600 mg twice daily  topiramate 250 mg twice daily   Epidiolex 500 mg BID  Remained seizure-free throughout hospitalization, stable for discharge

## 2024-11-09 NOTE — DISCHARGE SUMMARY
Discharge Summary - Hospitalist   Name: Maira Bull 44 y.o. female I MRN: [id number]  Unit/Bed#: 415-01 I Date of Admission: 11/5/2024   Date of Service: 11/9/2024 I Hospital Day: 3     Assessment & Plan  Sepsis without septic shock (HCC)  Patient presented (11/5) from facility with reports of malfunctioning PEG tube - adjusted in ED with no residual PEG tube dysfunction during hospitalization   Patient nonverbal secondary to anoxic brain injury at baseline  History obtained from medical staff and record    In ED, patient incidentally noted with fever, tachycardia, tachypnea, leukocytosis-without lactic acidosis or hemodynamic compromise-MAP maintained greater than 65  Prompted infectious workup-UA negative, CT chest abdomen pelvis obtained which was suggestive of multifocal lung infiltrates suspected infectious/bacterial in origin  Integument intact  Received fluids/Rocephin in ED    Appears to be pulmonary source given evidence of multifocal pneumonia on CT imaging  Blood cultures remain with no growth at 72 hrs  Received empiric antimicrobial coverage with presumed pulmonary source-Rocephin/Zithromax  Leukocytosis resolved, no fever  Discharged on empiric antibiotic regimen with Ceftin/azithromycin to complete course    Multifocal pneumonia  As noted on CT imaging  Likely, source of patient's presentation with SIRS, suspect aspiration  Patient remains on room air and appears clinically improved  Proceed with Rocephin/Zithromax  Patient incontinent of urine - unable to check for strep/Legionella antigen  Unable to obtain sputum culture  Discharged to complete course of antibiotics with Ceftin, azithromycin  Nonintractable Lennox-Gastaut syndrome without status epilepticus (HCC)  Chronic condition/stable  Recently hospitalized secondary to intractable seizures-followed by neurology and was transferred to [facility]-antiepileptic regimen optimized and as follows:  Briviact increased to 100 mg BID    clobazam 10 mg BID  lacosamide 200 mg twice daily  rufinamide 1600 mg twice daily  topiramate 250 mg twice daily   Epidiolex 500 mg BID  Remained seizure-free throughout hospitalization, stable for discharge     Medical Problems       Resolved Problems  Date Reviewed: 9/19/2024   None       Discharging Physician / Practitioner: Dasia Castillo MD  PCP: Doe Miller DO  Admission Date:   Admission Orders (From admission, onward)       Ordered        11/06/24 0246  INPATIENT ADMISSION  Once                          Discharge Date: 11/09/24    Consultations During Hospital Stay:  CM    Procedures Performed:   CT chest abdomen pelvis w contrast   Final Result by Chintan Major MD (11/06 0709)      Right upper and lower lobe pulmonary opacities likely representing pneumonia.      No acute intra-abdominal abnormality.      Bilateral nonobstructing renal calculi.         Workstation performed: [id number]         XR chest portable   ED Interpretation by John Alejandro MD (11/05 2234)   Abnormal   Possible opacities in R low lung field      Final Result by Khadar Lovell MD (11/06 0941)      New right-sided pulmonary infiltrate compatible with pneumonia. Suggest continued follow-up to ensure clearing with treatment.      Finding was noted by the clinician as per the note in epic            Workstation performed: [id number]               Significant Findings / Test Results:   Results from last 7 days   Lab Units 11/09/24  0459   WBC Thousand/uL 6.82   HEMOGLOBIN g/dL 11.2*   HEMATOCRIT % 35.2   PLATELETS Thousands/uL 219     Results from last 7 days   Lab Units 11/09/24  0459   SODIUM mmol/L 139   POTASSIUM mmol/L 3.6   CHLORIDE mmol/L 110*   CO2 mmol/L 19*   BUN mg/dL 14   CREATININE mg/dL 0.38*   CALCIUM mg/dL 9.3       Test Results Pending at Discharge (will require follow up):   None     Outpatient Tests Requested:  None    Complications:  None    Reason for Admission: PEG  "dysfunction, sepsis    Hospital Course:   Maira Bull is a 44 y.o. female patient with significant disability at baseline, nonverbal, history of Lennox Gestalt syndrome, status post PEG tube on tube feeds who originally presented to the hospital on 11/5/2024 due to concern for PEG tube dysfunction which was repaired in the ED.  On the patient's presentation to the ED she was found to meet SIRS criteria with pneumonia, likely aspiration as source.  Her infectious workup was otherwise unrevealing.  She underwent course of IV antibiotics and transition to antibiotics via her PEG tube on discharge.  The patient was discharged in a stable condition.    Please see above list of diagnoses and related plan for additional information.     Condition at Discharge: stable    Discharge Day Visit / Exam:     Subjective:  Patient alert, appears at baseline. No overnight events reported.    Vitals: Blood Pressure: 106/72 (11/09/24 0724)  Pulse: 79 (11/09/24 0724)  Temperature: 97.8 °F (36.6 °C) (11/09/24 0724)  Temp Source: Axillary (11/08/24 0426)  Respirations: 14 (11/09/24 0724)  Height: 5' 1\" (154.9 cm) (11/06/24 0340)  Weight - Scale: 44.5 kg (98 lb 1.7 oz) (11/06/24 0340)  SpO2: 98 % (11/09/24 0724)    Physical Exam  Constitutional:       General: She is not in acute distress.  HENT:      Head: Normocephalic and atraumatic.   Eyes:      Conjunctiva/sclera: Conjunctivae normal.   Cardiovascular:      Rate and Rhythm: Normal rate and regular rhythm.   Pulmonary:      Effort: No respiratory distress.   Abdominal:      General: There is no distension.      Tenderness: There is no abdominal tenderness. There is no guarding.   Musculoskeletal:      Right lower leg: No edema.      Left lower leg: No edema.   Skin:     General: Skin is warm and dry.   Neurological:      Mental Status: Mental status is at baseline.          Discussion with Family: Attempted to update  (brother) via phone. Left voicemail. "     Discharge instructions/Information to patient and family:   See after visit summary for information provided to patient and family.      Provisions for Follow-Up Care:  See after visit summary for information related to follow-up care and any pertinent home health orders.      Mobility at time of Discharge:   Basic Mobility Inpatient Raw Score: 8  -[facility] Goal: 3: Sit at edge of bed  -HLM Achieved: 2: Bed activities/Dependent transfer       Disposition:   Other Skilled Nursing Facility at [place]    Planned Readmission: No    Discharge Medications:  See after visit summary for reconciled discharge medications provided to patient and/or family.      Administrative Statements   Discharge Statement:  I have spent a total time of 35 minutes in caring for this patient on the day of the visit/encounter. >30 minutes of time was spent on: Documenting in the medical record, Reviewing / ordering tests, medicine, procedures  , and Communicating with other healthcare professionals .    **Please Note: This note may have been constructed using a voice recognition system**

## 2024-11-09 NOTE — ASSESSMENT & PLAN NOTE
As noted on CT imaging  Likely, source of patient's presentation with SIRS, suspect aspiration  Patient remains on room air and appears clinically improved  Proceed with Rocephin/Zithromax  Patient incontinent of urine - unable to check for strep/Legionella antigen  Unable to obtain sputum culture  Discharged to complete course of antibiotics with Ceftin, azithromycin

## 2024-11-09 NOTE — ASSESSMENT & PLAN NOTE
Patient presented (11/5) from facility with reports of malfunctioning PEG tube - adjusted in ED with no residual PEG tube dysfunction during hospitalization   Patient nonverbal secondary to anoxic brain injury at baseline  History obtained from medical staff and record    In ED, patient incidentally noted with fever, tachycardia, tachypnea, leukocytosis-without lactic acidosis or hemodynamic compromise-MAP maintained greater than 65  Prompted infectious workup-UA negative, CT chest abdomen pelvis obtained which was suggestive of multifocal lung infiltrates suspected infectious/bacterial in origin  Integument intact  Received fluids/Rocephin in ED    Appears to be pulmonary source given evidence of multifocal pneumonia on CT imaging  Blood cultures remain with no growth at 72 hrs  Received empiric antimicrobial coverage with presumed pulmonary source-Rocephin/Zithromax  Leukocytosis resolved, no fever  Discharged on empiric antibiotic regimen with Ceftin/azithromycin to complete course

## 2024-11-09 NOTE — NURSING NOTE
Patient discharged and transported by ambulance to [facility]. All necessary paperwork sent to receiving facility.

## 2024-11-11 LAB
BACTERIA BLD CULT: NORMAL
BACTERIA BLD CULT: NORMAL

## 2024-11-15 ENCOUNTER — HOSPITAL ENCOUNTER (EMERGENCY)
Facility: HOSPITAL | Age: 44
Discharge: HOME | End: 2024-11-15
Attending: EMERGENCY MEDICINE
Payer: MEDICARE

## 2024-11-15 VITALS
HEART RATE: 77 BPM | DIASTOLIC BLOOD PRESSURE: 75 MMHG | OXYGEN SATURATION: 99 % | WEIGHT: 102.07 LBS | SYSTOLIC BLOOD PRESSURE: 114 MMHG | RESPIRATION RATE: 18 BRPM | BODY MASS INDEX: 19.29 KG/M2 | TEMPERATURE: 97.6 F

## 2024-11-15 DIAGNOSIS — G40.919 BREAKTHROUGH SEIZURE (HCC): Primary | ICD-10-CM

## 2024-11-15 LAB
ALBUMIN SERPL BCG-MCNC: 4.3 G/DL (ref 3.5–5)
ALP SERPL-CCNC: 80 U/L (ref 34–104)
ALT SERPL W P-5'-P-CCNC: 32 U/L (ref 7–52)
ANION GAP SERPL CALCULATED.3IONS-SCNC: 8 MMOL/L (ref 4–13)
AST SERPL W P-5'-P-CCNC: 25 U/L (ref 13–39)
BASOPHILS # BLD AUTO: 0.07 THOUSANDS/ÂΜL (ref 0–0.1)
BASOPHILS NFR BLD AUTO: 1 % (ref 0–1)
BILIRUB SERPL-MCNC: 0.3 MG/DL (ref 0.2–1)
BILIRUB UR QL STRIP: NEGATIVE
BUN SERPL-MCNC: 19 MG/DL (ref 5–25)
CALCIUM SERPL-MCNC: 10.1 MG/DL (ref 8.4–10.2)
CHLORIDE SERPL-SCNC: 107 MMOL/L (ref 96–108)
CLARITY UR: CLEAR
CO2 SERPL-SCNC: 26 MMOL/L (ref 21–32)
COLOR UR: YELLOW
CREAT SERPL-MCNC: 0.55 MG/DL (ref 0.6–1.3)
EOSINOPHIL # BLD AUTO: 0.21 THOUSAND/ÂΜL (ref 0–0.61)
EOSINOPHIL NFR BLD AUTO: 3 % (ref 0–6)
ERYTHROCYTE [DISTWIDTH] IN BLOOD BY AUTOMATED COUNT: 12.6 % (ref 11.6–15.1)
GFR SERPL CREATININE-BSD FRML MDRD: 115 ML/MIN/1.73SQ M
GLUCOSE SERPL-MCNC: 105 MG/DL (ref 65–140)
GLUCOSE UR STRIP-MCNC: NEGATIVE MG/DL
HCT VFR BLD AUTO: 38.4 % (ref 34.8–46.1)
HGB BLD-MCNC: 12.3 G/DL (ref 11.5–15.4)
HGB UR QL STRIP.AUTO: NEGATIVE
IMM GRANULOCYTES # BLD AUTO: 0.03 THOUSAND/UL (ref 0–0.2)
IMM GRANULOCYTES NFR BLD AUTO: 0 % (ref 0–2)
KETONES UR STRIP-MCNC: NEGATIVE MG/DL
LACOSAMIDE SERPL-MCNC: 7.17 UG/ML (ref 1–20)
LEUKOCYTE ESTERASE UR QL STRIP: NEGATIVE
LYMPHOCYTES # BLD AUTO: 2.8 THOUSANDS/ÂΜL (ref 0.6–4.47)
LYMPHOCYTES NFR BLD AUTO: 38 % (ref 14–44)
MAGNESIUM SERPL-MCNC: 2.3 MG/DL (ref 1.9–2.7)
MCH RBC QN AUTO: 31.3 PG (ref 26.8–34.3)
MCHC RBC AUTO-ENTMCNC: 32 G/DL (ref 31.4–37.4)
MCV RBC AUTO: 98 FL (ref 82–98)
MONOCYTES # BLD AUTO: 0.72 THOUSAND/ÂΜL (ref 0.17–1.22)
MONOCYTES NFR BLD AUTO: 10 % (ref 4–12)
NEUTROPHILS # BLD AUTO: 3.49 THOUSANDS/ÂΜL (ref 1.85–7.62)
NEUTS SEG NFR BLD AUTO: 48 % (ref 43–75)
NITRITE UR QL STRIP: NEGATIVE
NRBC BLD AUTO-RTO: 0 /100 WBCS
PH UR STRIP.AUTO: 6 [PH]
PLATELET # BLD AUTO: 373 THOUSANDS/UL (ref 149–390)
PMV BLD AUTO: 10.1 FL (ref 8.9–12.7)
POTASSIUM SERPL-SCNC: 4.1 MMOL/L (ref 3.5–5.3)
PROT SERPL-MCNC: 8.1 G/DL (ref 6.4–8.4)
PROT UR STRIP-MCNC: NEGATIVE MG/DL
RBC # BLD AUTO: 3.93 MILLION/UL (ref 3.81–5.12)
SODIUM SERPL-SCNC: 141 MMOL/L (ref 135–147)
SP GR UR STRIP.AUTO: >=1.03 (ref 1–1.03)
UROBILINOGEN UR STRIP-ACNC: <2 MG/DL
WBC # BLD AUTO: 7.32 THOUSAND/UL (ref 4.31–10.16)

## 2024-11-15 PROCEDURE — 99284 EMERGENCY DEPT VISIT MOD MDM: CPT

## 2024-11-15 PROCEDURE — 83735 ASSAY OF MAGNESIUM: CPT | Performed by: EMERGENCY MEDICINE

## 2024-11-15 PROCEDURE — 81003 URINALYSIS AUTO W/O SCOPE: CPT | Performed by: EMERGENCY MEDICINE

## 2024-11-15 PROCEDURE — 80299 QUANTITATIVE ASSAY DRUG: CPT | Performed by: EMERGENCY MEDICINE

## 2024-11-15 PROCEDURE — 99285 EMERGENCY DEPT VISIT HI MDM: CPT | Performed by: EMERGENCY MEDICINE

## 2024-11-15 PROCEDURE — 36415 COLL VENOUS BLD VENIPUNCTURE: CPT | Performed by: EMERGENCY MEDICINE

## 2024-11-15 PROCEDURE — 80201 ASSAY OF TOPIRAMATE: CPT | Performed by: EMERGENCY MEDICINE

## 2024-11-15 PROCEDURE — 85025 COMPLETE CBC W/AUTO DIFF WBC: CPT | Performed by: EMERGENCY MEDICINE

## 2024-11-15 PROCEDURE — 80235 DRUG ASSAY LACOSAMIDE: CPT | Performed by: EMERGENCY MEDICINE

## 2024-11-15 PROCEDURE — 80053 COMPREHEN METABOLIC PANEL: CPT | Performed by: EMERGENCY MEDICINE

## 2024-11-15 PROCEDURE — 80210 DRUG ASSAY RUFINAMIDE: CPT | Performed by: EMERGENCY MEDICINE

## 2024-11-15 RX ORDER — MIDAZOLAM HYDROCHLORIDE 1 MG/ML
1 INJECTION INTRAMUSCULAR; INTRAVENOUS ONCE
Status: COMPLETED | OUTPATIENT
Start: 2024-11-15 | End: 2024-11-15

## 2024-11-15 NOTE — ED PROVIDER NOTES
Time reflects when diagnosis was documented in both MDM as applicable and the Disposition within this note       Time User Action Codes Description Comment    11/15/2024  4:43 PM AlejoNataly dang MIGUELINA Add [G40.919] Breakthrough seizure (HCC)           ED Disposition       ED Disposition   Discharge    Condition   Stable    Date/Time   Fri Nov 15, 2024  7:32 PM    Comment   Maira Bull discharge to home/self care.                   Assessment & Plan       Medical Decision Making  44-year-old female presents from [facility] via ambulance for seizure-like activity which is reported to be stiffening she received midazolam 2 mg IV via EMS upon arrival here patient is alert can track she is nonverbal at baseline spontaneous movement of extremities.  Vital signs are stable.  Accu-Chek was normal and route.  Will recheck electrolytes or white count discharge summary from 11/5 to 11/9/2024 was reviewed as well as her discharge summary from 10/1/2024 when she was at [facility] for neurology ration for intractable seizures.  Will obtain MAR from the [facility] to make sure her antiseizure regimen has been up.  Send blood levels of her AED drugs; this time not demonstrating any signs or symptoms of SIRS.  Satting normally on room air.  Lungs are clear.  The focal pneumonia from prior admission was diagnosed on CT imaging.    Amount and/or Complexity of Data Reviewed  Labs: ordered.    Risk  Prescription drug management.        ED Course as of 11/16/24 0026   Fri Nov 15, 2024   1639 Verified medications on DC summary from 11/9/24 with list from [facility]   8057 Secure chat with Dr. Guerra [facility] neurologist on call   0905 Case d/w Dr. Guerra recommends check to make sure no infection will cath u/a; his recommendation was no changes to medications as she is on maximal therapy for most and have her follow-up with Dr. España her neurologist for any additional medications are considered. Jany chavez are c/w  her dx of Lennox-Gastaust syndrome   1956 No evidence of UTI will have patient return to [facility] on current AED regimen ambulatory referral to Dr. España placed       Medications   midazolam (FOR EMS ONLY) (VERSED) 2 mg/2 mL injection 2 mg (0 mg Does not apply Given to EMS 11/15/24 1337)       ED Risk Strat Scores                           SBIRT 20yo+      Flowsheet Row Most Recent Value   Initial Alcohol Screen: US AUDIT-C     1. How often do you have a drink containing alcohol? 0 Filed at: 11/15/2024 1637   2. How many drinks containing alcohol do you have on a typical day you are drinking?  0 Filed at: 11/15/2024 1637   3a. Male UNDER 65: How often do you have five or more drinks on one occasion? 0 Filed at: 11/15/2024 1637   3b. FEMALE Any Age, or MALE 65+: How often do you have 4 or more drinks on one occassion? 0 Filed at: 11/15/2024 1637   Audit-C Score 0 Filed at: 11/15/2024 1637   SREE: How many times in the past year have you...    Used an illegal drug or used a prescription medication for non-medical reasons? Never Filed at: 11/15/2024 1637                            History of Present Illness       Chief Complaint   Patient presents with    Seizure - Prior Hx Of     Patient brought by ems from [place] for seizure like activity. Hx of seizures        Past Medical History:   Diagnosis Date    ADHD     Anoxic brain damage (HCC)     Autistic disorder     Breakthrough seizure (HCC) 8/1/2019    Dysphagia     Dysphagia, oropharyngeal phase     Gastrostomy tube dependent (HCC)     14 Fr as of 05/19/2020    Hyperammonemia (HCC)     Hyperkeratosis     Hypotension     Intellectual disability     Lennox-Gastaut syndrome with tonic seizures (HCC)     Lethargy     Liver enzyme elevation     Onychomycosis     Osteoporosis     Osteoporosis       Past Surgical History:   Procedure Laterality Date    ABDOMINAL SURGERY      CARDIAC PACEMAKER PLACEMENT      vns implant l chest    IR GASTROJEJUNOSTOMY (GJ) TUBE PLACEMENT   12/8/2023    IR GASTROSTOMY (G) TUBE CHECK/CHANGE/REINSERTION/UPSIZE  6/21/2024    IR GASTROSTOMY (G) TUBE CHECK/CHANGE/REINSERTION/UPSIZE  9/26/2024    IR GASTROSTOMY TUBE PLACEMENT  11/21/2019    IR THORACENTESIS  12/17/2018    JEJUNOSTOMY FEEDING TUBE      history of - most recently, PEG tube    PEG TUBE PLACEMENT      RI INSJ/RPLCMT CRANIAL NEUROSTIM PULSE GENERATOR Left 12/18/2019    Procedure: REPLACEMENT IMPLANTABLE PULSE GENERATOR FOR VAGAL NERVE STIMULATOR, LEFT CHEST;  Surgeon: Patrick Canales MD;  Location: [facility];  Service: Neurosurgery    VAGAL NERVE STIMULATOR (VNS) PLACEMENT Left 8/12/2024    Procedure: Reopening of the left chest incision for placement of implantable pulse generator for vagal nerve stimulator;  Surgeon: Charles Pendleton MD;  Location: [facility];  Service: Neurosurgery      History reviewed. No pertinent family history.   Social History     Tobacco Use    Smoking status: Never     Passive exposure: Never    Smokeless tobacco: Never   Vaping Use    Vaping status: Never Used   Substance Use Topics    Alcohol use: Never    Drug use: Never      E-Cigarette/Vaping    E-Cigarette Use Never User       E-Cigarette/Vaping Substances    Nicotine No     THC No     CBD No     Flavoring No     Other No     Unknown No       I have reviewed and agree with the history as documented.     44-year-old female with history of Lennox-Gastaut syndrome currently maintained on 6 antiepileptic medications and was recently hospitalized for sepsis/multifocal pneumonia.  She was discharged on 11/9/2024 received Rocephin/Zithromax while hospitalized and completed course of Ceftin and azithromycin as outpatient.  EMS got called to the skilled nursing facility due to stiffening of the extremities.  Patient received 2 mg of midazolam and route no further stiffening has been observed patient is nonverbal and is not otherwise able to provide a history.  Has medical history Lennox-Gastaut syndrome; autism intellectual  disability  Past surgical history includes vagal nerve stimulator PEG tube and pacemaker placement.        Review of Systems   Reason unable to perform ROS: nonverbal.           Objective       ED Triage Vitals   Temperature Pulse Blood Pressure Respirations SpO2 Patient Position - Orthostatic VS   11/15/24 1423 11/15/24 1319 11/15/24 1319 11/15/24 1319 11/15/24 1319 11/15/24 1319   97.8 °F (36.6 °C) 73 123/82 18 96 % Lying      Temp Source Heart Rate Source BP Location FiO2 (%) Pain Score    11/15/24 1922 11/15/24 1319 11/15/24 1319 -- --    Skin Monitor Left arm        Vitals      Date and Time Temp Pulse SpO2 Resp BP Pain Score FACES Pain Rating User   11/15/24 1922 97.6 °F (36.4 °C) 77 99 % 18 114/75 -- -- MS   11/15/24 1800 -- 78 97 % 17 121/82 -- -- SK   11/15/24 1600 -- 81 96 % 20 99/77 -- -- SK   11/15/24 1515 -- 70 96 % 18 124/86 -- -- SK   11/15/24 1423 97.8 °F (36.6 °C) -- -- -- -- -- -- SK   11/15/24 1319 -- 73 96 % 18 123/82 -- -- CLS            Physical Exam  Vitals and nursing note reviewed. Exam conducted with a chaperone present.   Constitutional:       General: She is not in acute distress.     Appearance: She is not ill-appearing, toxic-appearing or diaphoretic.   HENT:      Right Ear: There is impacted cerumen.      Left Ear: There is impacted cerumen.      Nose: No congestion or rhinorrhea.      Mouth/Throat:      Mouth: Mucous membranes are moist.   Eyes:      General:         Right eye: No discharge.         Left eye: No discharge.      Extraocular Movements: Extraocular movements intact.      Conjunctiva/sclera: Conjunctivae normal.      Pupils: Pupils are equal, round, and reactive to light.      Comments: 3 mm equal disconjugate gaze   Cardiovascular:      Rate and Rhythm: Normal rate and regular rhythm.      Pulses: Normal pulses.   Pulmonary:      Effort: Pulmonary effort is normal. No respiratory distress.      Breath sounds: Normal breath sounds. No stridor. No wheezing, rhonchi or  rales.   Abdominal:      General: Bowel sounds are normal. There is no distension.      Palpations: Abdomen is soft.      Tenderness: There is no abdominal tenderness. There is no guarding or rebound.      Comments: PEG tube site clean dry and intact   Genitourinary:     Comments: Chaparoned by Adry KOCH no skin breakdown  Musculoskeletal:      Cervical back: Normal range of motion and neck supple.      Comments: Ecchymosis to left lateral wrist no edema or tenderness   Skin:     Capillary Refill: Capillary refill takes less than 2 seconds.   Neurological:      Mental Status: She is alert. Mental status is at baseline.      Cranial Nerves: No cranial nerve deficit.      Sensory: No sensory deficit.      Motor: No weakness.      Comments: Contracture of lower extremities   Psychiatric:      Comments: flat         Results Reviewed       Procedure Component Value Units Date/Time    Lacosamide [[id number]]  (Normal) Collected: 11/15/24 1516    Lab Status: Final result Specimen: Blood from Arm, Left Updated: 11/15/24 2002     Lacosamide 7.17 ug/mL     Narrative:      Coadministration of carbamazepine and phenytoin (inducers of drugmetabolizing  enzymes) may decrease serum concentrations of lacosamide substantially.  Lacosamide drug concentrations should not be the only means of therapeutic drug  management. The assay should be used in conjunction with information available from clinical  evaluations and other diagnostic procedures. Clinicians should carefully monitor patients  during therapy and dosage adjustments.      UA w Reflex to Microscopic w Reflex to Culture [[id number]] Collected: 11/15/24 1838    Lab Status: Final result Specimen: Urine, Straight Cath Updated: 11/15/24 1857     Color, UA Yellow     Clarity, UA Clear     Specific Gravity, UA >=1.030     pH, UA 6.0     Leukocytes, UA Negative     Nitrite, UA Negative     Protein, UA Negative mg/dl      Glucose, UA Negative mg/dl      Ketones, UA Negative mg/dl       Urobilinogen, UA <2.0 mg/dl      Bilirubin, UA Negative     Occult Blood, UA Negative    Magnesium [[id number]]  (Normal) Collected: 11/15/24 1516    Lab Status: Final result Specimen: Blood from Arm, Left Updated: 11/15/24 1550     Magnesium 2.3 mg/dL     Comprehensive metabolic panel [[id number]]  (Abnormal) Collected: 11/15/24 1516    Lab Status: Final result Specimen: Blood from Arm, Left Updated: 11/15/24 1550     Sodium 141 mmol/L      Potassium 4.1 mmol/L      Chloride 107 mmol/L      CO2 26 mmol/L      ANION GAP 8 mmol/L      BUN 19 mg/dL      Creatinine 0.55 mg/dL      Glucose 105 mg/dL      Calcium 10.1 mg/dL      AST 25 U/L      ALT 32 U/L      Alkaline Phosphatase 80 U/L      Total Protein 8.1 g/dL      Albumin 4.3 g/dL      Total Bilirubin 0.30 mg/dL      eGFR 115 ml/min/1.73sq m     Narrative:      National Kidney Disease Foundation guidelines for Chronic Kidney Disease (CKD):     Stage 1 with normal or high GFR (GFR > 90 mL/min/1.73 square meters)    Stage 2 Mild CKD (GFR = 60-89 mL/min/1.73 square meters)    Stage 3A Moderate CKD (GFR = 45-59 mL/min/1.73 square meters)    Stage 3B Moderate CKD (GFR = 30-44 mL/min/1.73 square meters)    Stage 4 Severe CKD (GFR = 15-29 mL/min/1.73 square meters)    Stage 5 End Stage CKD (GFR <15 mL/min/1.73 square meters)  Note: GFR calculation is accurate only with a steady state creatinine    Topiramate level [[id number]] Collected: 11/15/24 1516    Lab Status: In process Specimen: Blood from Arm, Left Updated: 11/15/24 1537    RUFINAMIDE (BANZEL) Level [[id number]] Collected: 11/15/24 1516    Lab Status: In process Specimen: Blood from Arm, Left Updated: 11/15/24 1537    Clobazam [[id number]] Collected: 11/15/24 1516    Lab Status: In process Specimen: Blood from Arm, Left Updated: 11/15/24 1537    Brivaracetam [[id number]] Collected: 11/15/24 1516    Lab Status: In process Specimen: Blood from Arm, Left Updated: 11/15/24 1530    CBC and differential [[id number]]  Collected: 11/15/24 1516    Lab Status: Final result Specimen: Blood from Arm, Left Updated: 11/15/24 1528     WBC 7.32 Thousand/uL      RBC 3.93 Million/uL      Hemoglobin 12.3 g/dL      Hematocrit 38.4 %      MCV 98 fL      MCH 31.3 pg      MCHC 32.0 g/dL      RDW 12.6 %      MPV 10.1 fL      Platelets 373 Thousands/uL      nRBC 0 /100 WBCs      Segmented % 48 %      Immature Grans % 0 %      Lymphocytes % 38 %      Monocytes % 10 %      Eosinophils Relative 3 %      Basophils Relative 1 %      Absolute Neutrophils 3.49 Thousands/µL      Absolute Immature Grans 0.03 Thousand/uL      Absolute Lymphocytes 2.80 Thousands/µL      Absolute Monocytes 0.72 Thousand/µL      Eosinophils Absolute 0.21 Thousand/µL      Basophils Absolute 0.07 Thousands/µL             No orders to display       Procedures    ED Medication and Procedure Management   Prior to Admission Medications   Prescriptions Last Dose Informant Patient Reported? Taking?   Brivaracetam (Briviact) 100 MG TABS   No No   Si tablet (100 mg total) by Per PEG Tube route 2 (two) times a day   Probiotic Product (ALEXANDER-BID PROBIOTIC PO)  Outside Facility (Specify) Yes No   Si tablet by Per G Tube route daily   VITAMIN D PO  Outside Facility (Specify) Yes No   Sig: Take 1,000 mg by mouth in the morning Given in her G-tube   acetaminophen (TYLENOL) 325 mg tablet  Outside Facility (Specify) Yes No   Sig: Take 650 mg by mouth every 4 (four) hours as needed for mild pain or fever   bisacodyl (DULCOLAX) 10 mg suppository  Outside Facility (Specify) Yes No   Sig: Insert 10 mg into the rectum daily   cannabidiol (Epidiolex) 100 mg/ml SOLN   No No   Si mL (500 mg total) by Per G Tube route 2 (two) times a day   cloBAZam (Onfi) 10 MG tablet   No No   Si tablet (10 mg total) by Per G Tube route 2 (two) times a day   diazepam (VALIUM) 5 MG/ML solution  Outside Facility (Specify) No No   Sig: If the patient has a seizure lasting more than 3 minutes then give 1mL  by PEG tube, may repeat 1mL if she continues to have seizure for more than 10 minutes.   lacosamide (VIMPAT) 200 mg tablet   No No   Si tablet (200 mg total) by Per PEG Tube route every 12 (twelve) hours   magnesium hydroxide (MILK OF MAGNESIA) 400 mg/5 mL oral suspension  Outside Facility (Specify) Yes No   Si mL by Per G Tube route daily as needed for constipation   rufinamide (BANZEL) 400 mg tablet   No No   Si tablets (1,600 mg total) by Per G Tube route every 12 (twelve) hours   topiramate (TOPAMAX) 50 MG tablet   No No   Si tablets (250 mg total) by Per G Tube route every 12 (twelve) hours      Facility-Administered Medications: None     Discharge Medication List as of 11/15/2024  8:49 PM        CONTINUE these medications which have NOT CHANGED    Details   acetaminophen (TYLENOL) 325 mg tablet Take 650 mg by mouth every 4 (four) hours as needed for mild pain or fever, Historical Med      bisacodyl (DULCOLAX) 10 mg suppository Insert 10 mg into the rectum daily, Historical Med      Brivaracetam (Briviact) 100 MG TABS 1 tablet (100 mg total) by Per PEG Tube route 2 (two) times a day, Starting Fri 10/25/2024, Normal      cannabidiol (Epidiolex) 100 mg/ml SOLN 5 mL (500 mg total) by Per G Tube route 2 (two) times a day, Starting Fri 10/25/2024, Normal      cloBAZam (Onfi) 10 MG tablet 1 tablet (10 mg total) by Per G Tube route 2 (two) times a day, Starting Fri 10/25/2024, Normal      diazepam (VALIUM) 5 MG/ML solution If the patient has a seizure lasting more than 3 minutes then give 1mL by PEG tube, may repeat 1mL if she continues to have seizure for more than 10 minutes., Print      lacosamide (VIMPAT) 200 mg tablet 1 tablet (200 mg total) by Per PEG Tube route every 12 (twelve) hours, Starting Fri 10/25/2024, Normal      magnesium hydroxide (MILK OF MAGNESIA) 400 mg/5 mL oral suspension 30 mL by Per G Tube route daily as needed for constipation, Historical Med      Probiotic Product (ALEXANDER-BID  PROBIOTIC PO) 1 tablet by Per G Tube route daily, Historical Med      rufinamide (BANZEL) 400 mg tablet 4 tablets (1,600 mg total) by Per G Tube route every 12 (twelve) hours, Starting Fri 10/25/2024, Normal      topiramate (TOPAMAX) 50 MG tablet 5 tablets (250 mg total) by Per G Tube route every 12 (twelve) hours, Starting Fri 10/25/2024, Normal      VITAMIN D PO Take 1,000 mg by mouth in the morning Given in her G-tube, Historical Med             ED SEPSIS DOCUMENTATION   Time reflects when diagnosis was documented in both MDM as applicable and the Disposition within this note       Time User Action Codes Description Comment    11/15/2024  4:43 PM Nataly Mcclain [G40.919] Breakthrough seizure (HCC)                  Nataly Mcclain MD  11/16/24 0026

## 2024-11-15 NOTE — ED NOTES
Spoke to Brianne at [facility] and requested med list with last dose given to be faxed at this time.      Samia Rueda RN  11/15/24 4844

## 2024-11-15 NOTE — ED NOTES
Called [facility] again and spoke to Tita requesting another full med list with all meds including seizure meds and last dose given.      Saima Rueda RN  11/15/24 6989

## 2024-11-16 NOTE — DISCHARGE INSTRUCTIONS
Continue current medications  Return with recurrent seizures fever behavior change or any new or worsening symptoms

## 2024-11-16 NOTE — ED NOTES
Attempted report to [facility] w/o success, discharge instructions sent w/patient upon departure     Eugenia Saleh RN  11/15/24 5637

## 2024-11-19 LAB — TOPIRAMATE SERPL-MCNC: 12.1 UG/ML (ref 2–25)

## 2024-11-20 LAB
BRIVARACETAM SERPL-MCNC: 1.62 UG/ML (ref 0.2–2)
RUFINAMIDE SERPL-MCNC: 15.6 UG/ML

## 2024-11-24 LAB
CLOBAZAM SERPL-MCNC: 79 NG/ML (ref 30–300)
NORCLOBAZAM SERPL-MCNC: 2528 NG/ML (ref 300–3000)

## 2024-11-27 ENCOUNTER — HOSPITAL ENCOUNTER (EMERGENCY)
Facility: HOSPITAL | Age: 44
Discharge: HOME | End: 2024-11-27
Attending: EMERGENCY MEDICINE
Payer: MEDICARE

## 2024-11-27 ENCOUNTER — APPOINTMENT (EMERGENCY)
Dept: RADIOLOGY | Facility: HOSPITAL | Age: 44
End: 2024-11-27
Payer: MEDICARE

## 2024-11-27 VITALS
HEART RATE: 86 BPM | TEMPERATURE: 97.8 F | DIASTOLIC BLOOD PRESSURE: 79 MMHG | OXYGEN SATURATION: 97 % | SYSTOLIC BLOOD PRESSURE: 108 MMHG | RESPIRATION RATE: 22 BRPM

## 2024-11-27 DIAGNOSIS — Z46.59 ENCOUNTER FOR FEEDING TUBE PLACEMENT: ICD-10-CM

## 2024-11-27 DIAGNOSIS — T85.528A DISLODGED GASTROSTOMY TUBE: Primary | ICD-10-CM

## 2024-11-27 PROCEDURE — 99283 EMERGENCY DEPT VISIT LOW MDM: CPT

## 2024-11-27 PROCEDURE — 99284 EMERGENCY DEPT VISIT MOD MDM: CPT | Performed by: EMERGENCY MEDICINE

## 2024-11-27 PROCEDURE — 43762 RPLC GTUBE NO REVJ TRC: CPT | Performed by: EMERGENCY MEDICINE

## 2024-11-27 PROCEDURE — 74018 RADEX ABDOMEN 1 VIEW: CPT

## 2024-11-27 RX ADMIN — IOHEXOL 6 ML: 240 INJECTION, SOLUTION INTRATHECAL; INTRAVASCULAR; INTRAVENOUS; ORAL at 12:58

## 2024-11-27 NOTE — ED PROVIDER NOTES
Time reflects when diagnosis was documented in both MDM as applicable and the Disposition within this note       Time User Action Codes Description Comment    11/27/2024  1:12 PM Guillermo Ann Add [T85.528A] Dislodged gastrostomy tube     11/27/2024  1:12 PM Guillermo Ann Add [Z46.59] Encounter for feeding tube placement           ED Disposition       ED Disposition   Discharge    Condition   Stable    Date/Time   Wed Nov 27, 2024  1:11 PM    Comment   Maira Bull discharge to home/self care.                   Assessment & Plan       Medical Decision Making  Patient resides at nursing home and is nonverbal.  Sent for feeding tube displacement.    Problems Addressed:  Dislodged gastrostomy tube: acute illness or injury  Encounter for feeding tube placement: acute illness or injury    Amount and/or Complexity of Data Reviewed  Radiology: ordered and independent interpretation performed. Decision-making details documented in ED Course.    Risk  Prescription drug management.  Risk Details: Gastrostomy tube replaced, see procedure note.  Reviewed information with nursing home staff.             Medications   iohexol (OMNIPAQUE) 240 MG/ML solution 6 mL (6 mL Other Given 11/27/24 1258)       ED Risk Strat Scores                           SBIRT 22yo+      Flowsheet Row Most Recent Value   Initial Alcohol Screen: US AUDIT-C     1. How often do you have a drink containing alcohol? 0 Filed at: 11/27/2024 1148   2. How many drinks containing alcohol do you have on a typical day you are drinking?  0 Filed at: 11/27/2024 1148   3a. Male UNDER 65: How often do you have five or more drinks on one occasion? 0 Filed at: 11/27/2024 1148   3b. FEMALE Any Age, or MALE 65+: How often do you have 4 or more drinks on one occassion? 0 Filed at: 11/27/2024 1148   Audit-C Score 0 Filed at: 11/27/2024 1148   SREE: How many times in the past year have you...    Used an illegal drug or used a prescription medication for non-medical  reasons? Never Filed at: 11/27/2024 1148                            History of Present Illness       Chief Complaint   Patient presents with    Feeding Tube Problem     Tawnya reports patient pulled ut her peg tube.        Past Medical History:   Diagnosis Date    ADHD     Anoxic brain damage (HCC)     Autistic disorder     Breakthrough seizure (HCC) 8/1/2019    Dysphagia     Dysphagia, oropharyngeal phase     Gastrostomy tube dependent (HCC)     14 Fr as of 05/19/2020    Hyperammonemia (HCC)     Hyperkeratosis     Hypotension     Intellectual disability     Lennox-Gastaut syndrome with tonic seizures (HCC)     Lethargy     Liver enzyme elevation     Onychomycosis     Osteoporosis     Osteoporosis       Past Surgical History:   Procedure Laterality Date    ABDOMINAL SURGERY      CARDIAC PACEMAKER PLACEMENT      vns implant l chest    IR GASTROJEJUNOSTOMY (GJ) TUBE PLACEMENT  12/8/2023    IR GASTROSTOMY (G) TUBE CHECK/CHANGE/REINSERTION/UPSIZE  6/21/2024    IR GASTROSTOMY (G) TUBE CHECK/CHANGE/REINSERTION/UPSIZE  9/26/2024    IR GASTROSTOMY TUBE PLACEMENT  11/21/2019    IR THORACENTESIS  12/17/2018    JEJUNOSTOMY FEEDING TUBE      history of - most recently, PEG tube    PEG TUBE PLACEMENT      [place] INSJ/RPLCMT CRANIAL NEUROSTIM PULSE GENERATOR Left 12/18/2019    Procedure: REPLACEMENT IMPLANTABLE PULSE GENERATOR FOR VAGAL NERVE STIMULATOR, LEFT CHEST;  Surgeon: Patrick Canales MD;  [facility];  Service: Neurosurgery    VAGAL NERVE STIMULATOR (VNS) PLACEMENT Left 8/12/2024    Procedure: Reopening of the left chest incision for placement of implantable pulse generator for vagal nerve stimulator;  Surgeon: Charles Pendleton MD;  Location: [facility];  Service: Neurosurgery      History reviewed. No pertinent family history.   Social History     Tobacco Use    Smoking status: Never     Passive exposure: Never    Smokeless tobacco: Never   Vaping Use    Vaping status: Never Used   Substance Use Topics    Alcohol use:  Never    Drug use: Never      E-Cigarette/Vaping    E-Cigarette Use Never User       E-Cigarette/Vaping Substances    Nicotine No     THC No     CBD No     Flavoring No     Other No     Unknown No       I have reviewed and agree with the history as documented.     Patient resides at nursing home and is nonverbal.  Sent for feeding tube displacement.  Nursing home staff reports they noted tube became dislodged this morning.      Feeding Tube Problem      Review of Systems   Unable to perform ROS: Patient nonverbal           Objective       ED Triage Vitals [11/27/24 1145]   Temperature Pulse Blood Pressure Respirations SpO2 Patient Position - Orthostatic VS   97.8 °F (36.6 °C) 95 112/68 20 97 % Lying      Temp Source Heart Rate Source BP Location FiO2 (%) Pain Score    Temporal Monitor Right arm -- --      Vitals      Date and Time Temp Pulse SpO2 Resp BP Pain Score FACES Pain Rating User   11/27/24 1400 -- 86 97 % 22 108/79 -- --    11/27/24 1300 -- 90 97 % 20 112/68 -- --    11/27/24 1200 -- 91 94 % 20 -- -- --    11/27/24 1145 97.8 °F (36.6 °C) -- -- -- 112/68 -- --    11/27/24 1145 -- 95 97 % 20 -- -- -- SK            Physical Exam  Vitals and nursing note reviewed.   Constitutional:       General: She is not in acute distress.     Appearance: She is well-developed. She is not ill-appearing, toxic-appearing or diaphoretic.      Comments: Nonverbal and chronically ill-appearing   HENT:      Head: Normocephalic and atraumatic.      Nose: Nose normal.      Mouth/Throat:      Mouth: Mucous membranes are moist.      Pharynx: Oropharynx is clear.      Comments: Poor dentition  Eyes:      General: No scleral icterus.     Extraocular Movements: Extraocular movements intact.      Conjunctiva/sclera: Conjunctivae normal.   Cardiovascular:      Rate and Rhythm: Normal rate and regular rhythm.      Pulses: Normal pulses.      Heart sounds: Normal heart sounds. No murmur heard.     No friction rub. No gallop.  "  Pulmonary:      Effort: Pulmonary effort is normal. No respiratory distress.      Breath sounds: Normal breath sounds. No wheezing or rales.   Abdominal:      Palpations: Abdomen is soft. There is no mass.      Tenderness: There is no abdominal tenderness. There is no right CVA tenderness, left CVA tenderness, guarding or rebound.      Hernia: No hernia is present.      Comments: The site for the previous gastrostomy tube has essentially closed.  There is 1 small opening that was difficult to eventually open and required forceps to open and a 10 [place] catheter was placed to dilate the tract followed by placement of a 16 Hait[place] gastrostomy tube.  See procedure note.   Musculoskeletal:      Cervical back: Normal range of motion and neck supple. No rigidity or tenderness.      Right lower leg: No edema.      Left lower leg: No edema.   Lymphadenopathy:      Cervical: No cervical adenopathy.   Skin:     General: Skin is warm and dry.      Capillary Refill: Capillary refill takes less than 2 seconds.      Coloration: Skin is not jaundiced or pale.      Findings: No lesion or rash.   Neurological:      Mental Status: Mental status is at baseline.      Comments: No seizure activity         Results Reviewed       None            XR abdomen 1 view kub   Final Interpretation by E. Alec Schoenberger, MD (11/27 5000)   Gastrostomy tube in the stomach.               Workstation performed: [id number]             Feeding Tube    Date/Time: 11/27/2024 1:29 PM    Performed by: Guillermo Ann DO  Authorized by: Guillermo Ann DO  Universal Protocol:  procedure performed by consultantTime out: Immediately prior to procedure a \"time out\" was called to verify the correct patient, procedure, equipment, support staff and site/side marked as required.  Patient identity confirmed: arm band    Patient location:  ED  Pre-procedure details:     Old tube type:  Gastrostomy    Old tube size:  16 Fr  Indications:     " Indications: tube dislodged    Anesthesia (see MAR for exact dosages):     Anesthesia method:  None  Procedure details:     Patient position:  Supine    Procedure type:  Replacement    Tube type:  Gastrostomy    Tube size:  16 Fr    Bulb inflation volume:  6 cc    Bulb inflation fluid:  Sterile water  Post-procedure details:     Placement/position confirmation:  Gastric contents aspirated, x-ray and contrast    Placement difficulty:  Moderate    Bleeding:  Minimal    Patient tolerance of procedure:  Tolerated well, no immediate complications  Comments:      Tube placement verified with x-ray with contrast      ED Medication and Procedure Management   Prior to Admission Medications   Prescriptions Last Dose Informant Patient Reported? Taking?   Brivaracetam (Briviact) 100 MG TABS   No No   Si tablet (100 mg total) by Per PEG Tube route 2 (two) times a day   Probiotic Product (ALEXANDER-BID PROBIOTIC PO)  Outside Facility (Specify) Yes No   Si tablet by Per G Tube route daily   VITAMIN D PO  Outside Facility (Specify) Yes No   Sig: Take 1,000 mg by mouth in the morning Given in her G-tube   acetaminophen (TYLENOL) 325 mg tablet  Outside Facility (Specify) Yes No   Sig: Take 650 mg by mouth every 4 (four) hours as needed for mild pain or fever   bisacodyl (DULCOLAX) 10 mg suppository  Outside Facility (Specify) Yes No   Sig: Insert 10 mg into the rectum daily   cannabidiol (Epidiolex) 100 mg/ml SOLN   No No   Si mL (500 mg total) by Per G Tube route 2 (two) times a day   cloBAZam (Onfi) 10 MG tablet   No No   Si tablet (10 mg total) by Per G Tube route 2 (two) times a day   diazepam (VALIUM) 5 MG/ML solution  Outside Facility (Specify) No No   Sig: If the patient has a seizure lasting more than 3 minutes then give 1mL by PEG tube, may repeat 1mL if she continues to have seizure for more than 10 minutes.   lacosamide (VIMPAT) 200 mg tablet   No No   Si tablet (200 mg total) by Per PEG Tube route every 12  (twelve) hours   magnesium hydroxide (MILK OF MAGNESIA) 400 mg/5 mL oral suspension  Outside Facility (Specify) Yes No   Si mL by Per G Tube route daily as needed for constipation   rufinamide (BANZEL) 400 mg tablet   No No   Si tablets (1,600 mg total) by Per G Tube route every 12 (twelve) hours   topiramate (TOPAMAX) 50 MG tablet   No No   Si tablets (250 mg total) by Per G Tube route every 12 (twelve) hours      Facility-Administered Medications: None     Current Discharge Medication List        CONTINUE these medications which have NOT CHANGED    Details   acetaminophen (TYLENOL) 325 mg tablet Take 650 mg by mouth every 4 (four) hours as needed for mild pain or fever      bisacodyl (DULCOLAX) 10 mg suppository Insert 10 mg into the rectum daily      Brivaracetam (Briviact) 100 MG TABS 1 tablet (100 mg total) by Per PEG Tube route 2 (two) times a day  Qty: 60 tablet, Refills: 5    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)      cannabidiol (Epidiolex) 100 mg/ml SOLN 5 mL (500 mg total) by Per G Tube route 2 (two) times a day  Qty: 300 mL, Refills: 5    Associated Diagnoses: Intractable Lennox-Gastaut syndrome without status epilepticus (HCC)      cloBAZam (Onfi) 10 MG tablet 1 tablet (10 mg total) by Per G Tube route 2 (two) times a day  Qty: 60 tablet, Refills: 5    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  Qty: 30 mL, Refills: 0    Comments: Please discontinue V[place] prescription.  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)      magnesium hydroxide (MILK OF MAGNESIA) 400 mg/5 mL oral suspension 30 mL by Per G Tube route daily as needed for constipation      Probiotic Product (ALEXANDER-BID PROBIOTIC PO) 1 tablet by Per G Tube route daily      rufinamide (BANZEL) 400 mg tablet 4 tablets (1,600 mg total) by Per G Tube route every 12 (twelve) hours  Qty: 240 tablet, Refills: 5    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  ED SEPSIS DOCUMENTATION   Time reflects when diagnosis was documented in both MDM as applicable and the Disposition within this note       Time User Action Codes Description Comment    11/27/2024  1:12 PM Guillermo Ann Add [T85.528A] Dislodged gastrostomy tube     11/27/2024  1:12 PM Guillermo Ann Add [Z46.59] Encounter for feeding tube placement                  Guillermo Ann DO  11/27/24 5020

## 2024-12-03 ENCOUNTER — APPOINTMENT (EMERGENCY)
Dept: RADIOLOGY | Facility: HOSPITAL | Age: 44
End: 2024-12-03
Payer: MEDICARE

## 2024-12-03 ENCOUNTER — HOSPITAL ENCOUNTER (EMERGENCY)
Facility: HOSPITAL | Age: 44
Discharge: HOME | End: 2024-12-03
Attending: EMERGENCY MEDICINE
Payer: MEDICARE

## 2024-12-03 VITALS
HEART RATE: 87 BPM | DIASTOLIC BLOOD PRESSURE: 82 MMHG | OXYGEN SATURATION: 98 % | TEMPERATURE: 98.8 F | SYSTOLIC BLOOD PRESSURE: 113 MMHG | RESPIRATION RATE: 16 BRPM

## 2024-12-03 DIAGNOSIS — Z46.59 ENCOUNTER FOR FEEDING TUBE PLACEMENT: Primary | ICD-10-CM

## 2024-12-03 PROCEDURE — 74018 RADEX ABDOMEN 1 VIEW: CPT

## 2024-12-03 PROCEDURE — 99284 EMERGENCY DEPT VISIT MOD MDM: CPT | Performed by: PHYSICIAN ASSISTANT

## 2024-12-03 PROCEDURE — 43762 RPLC GTUBE NO REVJ TRC: CPT | Performed by: PHYSICIAN ASSISTANT

## 2024-12-03 PROCEDURE — 99284 EMERGENCY DEPT VISIT MOD MDM: CPT

## 2024-12-03 RX ADMIN — IOHEXOL 50 ML: 240 INJECTION, SOLUTION INTRATHECAL; INTRAVASCULAR; INTRAVENOUS; ORAL at 13:19

## 2024-12-03 NOTE — ED PROVIDER NOTES
Time reflects when diagnosis was documented in both MDM as applicable and the Disposition within this note       Time User Action Codes Description Comment    12/3/2024  1:19 PM Ned Landry Add [Z46.59] Encounter for feeding tube placement           ED Disposition       ED Disposition   Discharge    Condition   Stable    Date/Time   Tue Dec 3, 2024  1:19 PM    Comment   Maira Bull discharge to home/self care.                   Assessment & Plan       Medical Decision Making  44-year-old female objectively here for G-tube replacement.  See HPI for further details.  Differential diagnosis includes cellulitis, abscess, dislodged G-tube.    See procedure note for reinsertion.  Patient tolerated the procedure well stable for discharge back to the facility        Amount and/or Complexity of Data Reviewed  Radiology: ordered.    Risk  Prescription drug management.        ED Course as of 12/03/24 1445   Tue Dec 03, 2024   1253 SpO2: 97 %   1253 Respirations: 16   1253 Pulse: 90   1253 Temperature: 98.8 °F (37.1 °C)  Vs reviewed wnl       Medications   iohexol (OMNIPAQUE) 240 MG/ML solution 50 mL (50 mL Injection Given 12/3/24 1319)       ED Risk Strat Scores                           SBIRT 22yo+      Flowsheet Row Most Recent Value   Initial Alcohol Screen: US AUDIT-C     1. How often do you have a drink containing alcohol? 0 Filed at: 12/03/2024 1213   2. How many drinks containing alcohol do you have on a typical day you are drinking?  0 Filed at: 12/03/2024 1213   3a. Male UNDER 65: How often do you have five or more drinks on one occasion? 0 Filed at: 12/03/2024 1213   3b. FEMALE Any Age, or MALE 65+: How often do you have 4 or more drinks on one occassion? 0 Filed at: 12/03/2024 1213   Audit-C Score 0 Filed at: 12/03/2024 1213   SREE: How many times in the past year have you...    Used an illegal drug or used a prescription medication for non-medical reasons? Never Filed at: 12/03/2024 1213                             History of Present Illness       Chief Complaint   Patient presents with    Feeding Tube Problem     Patient brought from [place] and state she ripped out her peg tube        Past Medical History:   Diagnosis Date    ADHD     Anoxic brain damage (HCC)     Autistic disorder     Breakthrough seizure (HCC) 8/1/2019    Dysphagia     Dysphagia, oropharyngeal phase     Gastrostomy tube dependent (HCC)     14 Fr as of 05/19/2020    Hyperammonemia (HCC)     Hyperkeratosis     Hypotension     Intellectual disability     Lennox-Gastaut syndrome with tonic seizures (HCC)     Lethargy     Liver enzyme elevation     Onychomycosis     Osteoporosis     Osteoporosis       Past Surgical History:   Procedure Laterality Date    ABDOMINAL SURGERY      CARDIAC PACEMAKER PLACEMENT      vns implant l chest    IR GASTROJEJUNOSTOMY (GJ) TUBE PLACEMENT  12/8/2023    IR GASTROSTOMY (G) TUBE CHECK/CHANGE/REINSERTION/UPSIZE  6/21/2024    IR GASTROSTOMY (G) TUBE CHECK/CHANGE/REINSERTION/UPSIZE  9/26/2024    IR GASTROSTOMY TUBE PLACEMENT  11/21/2019    IR THORACENTESIS  12/17/2018    JEJUNOSTOMY FEEDING TUBE      history of - most recently, PEG tube    PEG TUBE PLACEMENT      [place] INSJ/RPLCMT CRANIAL NEUROSTIM PULSE GENERATOR Left 12/18/2019    Procedure: REPLACEMENT IMPLANTABLE PULSE GENERATOR FOR VAGAL NERVE STIMULATOR, LEFT CHEST;  Surgeon: Patrick Canales MD;  [facility];  Service: Neurosurgery    VAGAL NERVE STIMULATOR (VNS) PLACEMENT Left 8/12/2024    Procedure: Reopening of the left chest incision for placement of implantable pulse generator for vagal nerve stimulator;  Surgeon: Charles Pendleton MD;  [facility];  Service: Neurosurgery      History reviewed. No pertinent family history.   Social History     Tobacco Use    Smoking status: Never     Passive exposure: Never    Smokeless tobacco: Never   Vaping Use    Vaping status: Never Used   Substance Use Topics    Alcohol use: Never    Drug use: Never       E-Cigarette/Vaping    E-Cigarette Use Never User       E-Cigarette/Vaping Substances    Nicotine No     THC No     CBD No     Flavoring No     Other No     Unknown No       I have reviewed and agree with the history as documented.     This is a 44-year-old nonverbal female that is a permanent resident of a local nursing home who is here for yet another dislodged G-tube.  She had a 16 [place] G-tube that was placed here within the last week.  According to our nursing staff.  Took a phone call from the nursing staff at [facility] where she resides and stated that they noted that it was ripped out.  It is unclear what timeframe this was not provided to us.  History is not able to be provided for the patient secondary to her chronic nonverbal status.      Feeding Tube Problem      Review of Systems   Unable to perform ROS: Patient nonverbal           Objective       ED Triage Vitals   Temperature Pulse Blood Pressure Respirations SpO2 Patient Position - Orthostatic VS   12/03/24 1212 12/03/24 1212 12/03/24 1331 12/03/24 1212 12/03/24 1212 12/03/24 1212   98.8 °F (37.1 °C) 90 112/82 16 97 % Lying      Temp Source Heart Rate Source BP Location FiO2 (%) Pain Score    12/03/24 1212 12/03/24 1212 12/03/24 1212 -- --    Temporal Monitor Right arm        Vitals      Date and Time Temp Pulse SpO2 Resp BP Pain Score FACES Pain Rating User   12/03/24 1401 -- 83 97 % -- 113/79 -- -- LD   12/03/24 1331 -- 88 95 % -- 112/82 -- -- LD   12/03/24 1212 98.8 °F (37.1 °C) 90 97 % 16 -- -- 0 CLS            Physical Exam  Vitals reviewed.   Constitutional:       General: She is not in acute distress.     Appearance: Normal appearance. She is not ill-appearing, toxic-appearing or diaphoretic.   HENT:      Head: Normocephalic and atraumatic.      Right Ear: External ear normal.      Left Ear: External ear normal.   Eyes:      General: No scleral icterus.        Right eye: No discharge.         Left eye: No discharge.       Extraocular Movements: Extraocular movements intact.      Conjunctiva/sclera: Conjunctivae normal.   Cardiovascular:      Rate and Rhythm: Normal rate.      Pulses: Normal pulses.   Pulmonary:      Effort: Pulmonary effort is normal. No respiratory distress.      Breath sounds: No stridor.   Musculoskeletal:         General: No deformity or signs of injury.      Cervical back: Normal range of motion. No rigidity.   Skin:     General: Skin is warm.      Coloration: Skin is not jaundiced.      Findings: No lesion or rash.      Comments: Gastric tube abdominal site is without gastric tube, no bleeding/redness around site.    Neurological:      General: No focal deficit present.      Mental Status: She is alert and oriented to person, place, and time. Mental status is at baseline.      Gait: Gait normal.   Psychiatric:         Mood and Affect: Mood normal.         Thought Content: Thought content normal.         Judgment: Judgment normal.         Results Reviewed       None            XR abdomen 1 view portable    (Results Pending)       Feeding Tube    Date/Time: 12/3/2024 1:17 PM    Performed by: Ned Landry PA-C  Authorized by: Ned Landry PA-C  Universal Protocol:  Procedure performed by: (Registered nurses Maddy and Alea Mares)  Patient identity confirmed: arm band    Patient location:  Bedside  Pre-procedure details:     Old tube type:  Gastrostomy    Old tube size:  16 Fr  Indications:     Indications: tube dislodged    Anesthesia (see MAR for exact dosages):     Anesthesia method:  None  Procedure details:     Patient position:  Supine    Procedure type:  Replacement    Tube type:  Gastrostomy    Tube size:  16 Fr    Bulb inflation volume:  6 cc    Bulb inflation fluid:  Normal saline  Post-procedure details:     Placement/position confirmation:  Gastric contents aspirated and x-ray    Placement difficulty:  None    Bleeding:  None    Patient tolerance of procedure:  Tolerated well, no  immediate complications      ED Medication and Procedure Management   Prior to Admission Medications   Prescriptions Last Dose Informant Patient Reported? Taking?   Brivaracetam (Briviact) 100 MG TABS   No No   Si tablet (100 mg total) by Per PEG Tube route 2 (two) times a day   Probiotic Product (ALEXANDER-BID PROBIOTIC PO)  Outside Facility (Specify) Yes No   Si tablet by Per G Tube route daily   VITAMIN D PO  Outside Facility (Specify) Yes No   Sig: Take 1,000 mg by mouth in the morning Given in her G-tube   acetaminophen (TYLENOL) 325 mg tablet  Outside Facility (Specify) Yes No   Sig: Take 650 mg by mouth every 4 (four) hours as needed for mild pain or fever   bisacodyl (DULCOLAX) 10 mg suppository  Outside Facility (Specify) Yes No   Sig: Insert 10 mg into the rectum daily   cannabidiol (Epidiolex) 100 mg/ml SOLN   No No   Si mL (500 mg total) by Per G Tube route 2 (two) times a day   cloBAZam (Onfi) 10 MG tablet   No No   Si tablet (10 mg total) by Per G Tube route 2 (two) times a day   diazepam (VALIUM) 5 MG/ML solution  Outside Facility (Specify) No No   Sig: If the patient has a seizure lasting more than 3 minutes then give 1mL by PEG tube, may repeat 1mL if she continues to have seizure for more than 10 minutes.   lacosamide (VIMPAT) 200 mg tablet   No No   Si tablet (200 mg total) by Per PEG Tube route every 12 (twelve) hours   magnesium hydroxide (MILK OF MAGNESIA) 400 mg/5 mL oral suspension  Outside Facility (Specify) Yes No   Si mL by Per G Tube route daily as needed for constipation   rufinamide (BANZEL) 400 mg tablet   No No   Si tablets (1,600 mg total) by Per G Tube route every 12 (twelve) hours   topiramate (TOPAMAX) 50 MG tablet   No No   Si tablets (250 mg total) by Per G Tube route every 12 (twelve) hours      Facility-Administered Medications: None     Patient's Medications   Discharge Prescriptions    No medications on file     No discharge procedures on  file.  ED SEPSIS DOCUMENTATION   Time reflects when diagnosis was documented in both MDM as applicable and the Disposition within this note       Time User Action Codes Description Comment    12/3/2024  1:19 PM Ned Landry Add [Z46.59] Encounter for feeding tube placement                  Ned Landry PA-C  12/03/24 1445

## 2024-12-06 PROBLEM — J18.9 MULTIFOCAL PNEUMONIA: Status: RESOLVED | Noted: 2017-02-22 | Resolved: 2024-12-06

## 2024-12-06 PROBLEM — A41.9 SEPSIS WITHOUT SEPTIC SHOCK (HCC): Status: RESOLVED | Noted: 2018-10-09 | Resolved: 2024-12-06

## 2024-12-09 ENCOUNTER — OFFICE VISIT (OUTPATIENT)
Dept: NEUROLOGY | Facility: CLINIC | Age: 44
End: 2024-12-09
Payer: MEDICARE

## 2024-12-09 VITALS
RESPIRATION RATE: 18 BRPM | WEIGHT: 102 LBS | SYSTOLIC BLOOD PRESSURE: 91 MMHG | DIASTOLIC BLOOD PRESSURE: 67 MMHG | TEMPERATURE: 97.6 F | BODY MASS INDEX: 19.26 KG/M2 | OXYGEN SATURATION: 98 % | HEART RATE: 85 BPM | HEIGHT: 61 IN

## 2024-12-09 DIAGNOSIS — G40.812 NONINTRACTABLE LENNOX-GASTAUT SYNDROME WITHOUT STATUS EPILEPTICUS (HCC): Primary | ICD-10-CM

## 2024-12-09 DIAGNOSIS — Z96.89 S/P PLACEMENT OF VNS (VAGUS NERVE STIMULATION) DEVICE: ICD-10-CM

## 2024-12-09 PROCEDURE — 99214 OFFICE O/P EST MOD 30 MIN: CPT | Performed by: PHYSICIAN ASSISTANT

## 2024-12-09 NOTE — PROGRESS NOTES
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 Maira Bull is a 44 y.o. woman with intractable Lennox Gastaut Syndrome, with frequent tonic seizures (especially when she is asleep). These seizures are often short lasting and may go un-noticed. 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 Her EEG studies show both generalized onset and potential risk for focal onset seizures. She requires VNS therapy, as when the VNS battery was running low, she had an increase in seizure frequency and severity. Prior attempts to wean off antiseizure medications have resulted in breakthrough seizures. We had previously tried to wean off of clobazam due to excessive sedation but this has lead to an increase in tonic seizure activity. No medication changes are made during this visit. She is essentially at maximal doses of current antiseizure medications. If she is to continue with seizures, then a new medication will have to be considered.  Dr. España spoke with her brother regarding Fintepla at timing of October visit, but decision was made to hold off since seizures have been stable.    Per her nurse at [place], seizures have been stable.  there was a seizure in November and she was brought to the ED, with no additional seizures in the ED.  She had just recently been admitted for sepsis and multifocal pneumonia within the week before this happened.      She seems at her baseline for me today.  Was alert, playing with her toy, no acute distress.      Plan:   Medications are given by PEG tube  1 - Continue Epidiolex 100mg/mL 5.0 mL (500mg) every 12 hours  2 - Continue Briviact 100 mg tab one tabe every 12 hours  3 - Continue with Topiramate 50mg tabs  "give 5 tabs (250mg) every 12 hours  4 - continue Banzel 400mg give 4 tabs every 12 hours  5 - Continue Clobazam 10mg tab give one tab every 12 hours  6 - Continue Lacosamide 200mg tab one tab every 12 hours.  7 - give diazepam 5mg/mL 1 mL as needed for seizure last more than 3 minutes and repeat second dose for seizure lasting more than 10 minutes  8 - Call the office if there is increase in seizure frequency  9 - if there are more seizures; then will need to consider replacing lacosamide with Fintepla (this medication requires every 6 months echocardiogram to evaluate for valvular dysfunction and pulmonary hypertension.  10 - follow-up in 3 months        S/P placement of VNS (vagus nerve stimulation) device  VNS interrogated today, no settings were changed             History of Present Illness   HPI  Maira Bull is a 44 y.o.female here for follow-up evaluation of intractable epilepsy in the setting of LGS.      Interval History 12/9/2024  Last seen by Dr. España on 10/25/24. No changes were made to her AEDs.  He did speak with patient's brother regarding possibility of Fintepla.  Decision was made to hold off on this medication.    She presents to the office visit unaccompanied today, on a stretcher.  Per chart review, she was admitted 11/5/24-11/9/24, initially presenting with PEG tube dysfunction, but incidentally found to have sepsis and found to have multifocal pneumonia.no seizures reported during admission  There is an ED visit on 11/15/24 for a seizure.  There were no seizures in the ED, she remained stable.  No changes were made.    Today I spoke with ZEE Landry at [facility].  She says Maira has been doing ok from a seizure standpoint.  There were no reported seizures after the ED visit.  Overall however, they feel Maira has been declining from a medical standpoint.  She has had low BPs, more lethargic, pale, \"looks sickly\".  She notes on 12/5 they noticed she had expiratory wheezes.  She is now on " Mucinex and PRN neb treatments.       AED/side effects/compliance:  Banzel 400mg give 4 tabs twice a day   Topiramate 250mg tab twice a day   Epidiolex 500mg twice a day  Briviact 100 mg twice a day  Lacosamide 200-200  Onfi 10-10     Event/Seizure semiology:  Seizure semiology:  She was described to have drop attacks or spells with grunting sounds and falling to the floor.   Her nurse commented on episodes of spasms or myoclonic jerking of the right arm.   Generalized convulsions  Tonic seizures of eyes rolling back (mostly during sleep)     Prior Epilepsy History:  Collective epilepsy history 11/6/2014 was previously evaluated by Dr. Harper including a hospitalization in February 2013 for status epilepticus, in the setting of missed doses of AEDs due to refusal to eat. She subsequently required anesthetic induced coma to stop her seizures and PEG tube was placed. She was seen almost every month for management of her seizures up until November 2013. She has medically refractory seizures and had a VNS placed possibly in the early 2000s and a generator changed in 2011. Unknown AEDs that were tried but felbamate is listed as an allergy. In 2011, her AEDs were clonazepam, levetiracetam, Depakote and Lamictal. Dr. Harper had started Banzel in 2011. In 2012, Onfi was added with the reduction of clonazepam. There were difficulty with documenting seizure frequency; but seizures were no longer daily occurrences but there were clusters of seizures. There would be good periods and bad periods of seizure frequency. Dr. Harper had been increasing the duty cycle of the VNS over the course of 2012 to 2013. Sometime between August 2013 and October 2013, topiramate was introduced. She was in status epilepticus in 2013, she was on Keppra, Banzel, Onfi, and Lamictal. She had an EEG at some point that showed multifocal spike-wave and background attenuation. She has intermittent agitation and day time somnolence. When she was hospitalized  for status epilepticus, she was started on methylphenidate to help wake her up.      Intake history November 2014:  VPA level 127. Her Valproic Acid dose was previously 250mg q6hrs based on Dr. Harper's notes. Since July 2014, she has been receiving VPA 500mg q6hrs. She has not been hospitalized since September 2013. She was previously ambulatory with therapy. She spends most of her time sleeping for the past couple of months. She has also been receiving lactulose for elevated ammonia levels.   (older drugs VPA, LVT, LMG, newer drugs added on since 2011 RFN, Onfi, TPM)      Summary of 2015:  We decreased VPA from TDD 2000mg to 1000mg with increased agitation/combativeness, alternating days of exhaustion (no behavioral specialist has been involved). are randomly reported by multiple staff members. Seizures are typically reported by CNA's or her one to one on the 3PM to 11PM shift. Seizures are described a brief events of eyes rolling back and arms going up in the air, followed by hair pulling or slapping herself. These seizures were reported as either sporadic or every other day episodes. There have been no documented grand mal seizures or drop attacks. She refuses to wear safety helmet, rips at her hair, and attempts to flip herself out of her chair and bed. Maira can walk briefly but walks on her toes, she frequently will allow herself to fall down when she does not want to walk.  It does not appear that methylphenidate has been useful in maintaining her level of wakefulness during the day.    VNS generator change on 11/3/2015. The Model 103 (serial #[id number]) was replaced with SubHub Model 105, serial #[id number]. Weaned off of levetiracetam due to multiple medications and agitation; by the end of 2016, she was down to -500.       Summary of 2016:  Started with RFN [id number], -250, CLB 0.5-0.5-25,  QID, -200 attempted to wean off of LEV and reduced the dose of VPA given that there were no  reports of seizures and she was sedated, along with elevated ammonia levels. It was unclear as to how effective LEV was for her seizure control. When she missed a dose of TPM in September 2016, she had multiple seizures. We attempted to compensate for increase in seizures by increasing Onfi to 20-20; however, it seems that it made her more sedated.      Summary of 2017  RFN [id number], -250, Onfi 20-20, -200,  q8hrs. She has been more somnolent. She continues to have tonic seizures when she is asleep, eyes rolling body, arms will tense up with some twitching, last a few seconds, but will recur. There are no seizures during the daytime. We attempted to wean off of VPA due to ammonia / somnolence; but there was an increase in tonic seizures (tonic stiffness, eyes rolling upwards, and subtle arm (right?) jerking).      Hospital admission 10/10-10/26/2017, due to seizures in setting of infection, was put on continuous video EEG monitoring to determine her seizure type, seizure frequency, and rapid medication adjustments. Rapid med changes: d/c'ed lamotrigine, restarted levetiracetam, attempted discontinuation of valproic acid (more seizures, so restarted), attempted reduction in Onfi, and starting Fycompa. The patient's seizures were mostly based during sleep, tonic seizures.      Summary of 2018  Started with RFN [id number], PER 8, LEV 2711-8551, CLB 5-15, -250,  TID. Due to excessive somnolence Onfi was weaned off. She was on continuous EEG monitoring when she was in hospital for PNA that showed very frequent tonic seizures during sleep. VPA is causing hyperammoniemia. She has had more admissions for somnolence / lethargy, VPA was reduced for transaminitis. She was tested for inborn error of metabolism with plasma amino acid, urine organic acid, and acylcarnitine levels. There were nonspecific elevations in some amino acid or organic acid but no pattern consistent with a specific  inborn error of metabolism. Elevated carnitine level was consistent with supplementation. Higher doses of phenobarbital may also contribute to sedation, phenobarbital was reduced to 20mg but on follow-up she was on 20mL of oral solution (80mg / day).     There is variable reporting in the frequency of tonic seizures (these are the eyes are rolling back and shaking, then stop, then happen again in 10-15 minutes). Her level of alertness is also variable with erratic sleep cycle.     Summary 2019  We started the year with  TID, RFN [id number], -200, LEV 0576-7489, PER 10, PB 40 qHS. In December 2018, Hospital admission for septic shock and aspiration PNA. She had an EEG that captured recurrent tonic seizures during sleep but this is consistently found in all of her other EEG studies. Another hospital admission January 2019 for recurrent convulsive seizures.  Due to sedation Phenobarbital was weaned off and Fycompa was increased to 10mg at bedtime. TPM increased to 250-250. Tried to wean off of VPA due to ammonia levels. We started Epidiolex in the Spring 2019, which seems to have improved seizure control.     January 2019 - Her CNA reports that Maira is no longer Maira, she is essentially bed ridden. She does not eat and does not walk. Maira usually participate in activities, walking, and eating food (if she was at a [employer] she would be able to eat a hamburger without difficulty). I was able to speak to her father Oneil and he reported that he too saw a significant decline in mental status. There was a phone call from [facility] reporting an increase in seizure activity (brief episodes tonic-myoclonic jerking); but subsequent reports did not notice an increase in activity.      Weaned off of phenobarbital, suspecting that it was causing elevated transaminases. There is variable periods of wakefulness and alertness; seizures are reported sporadically, sometimes she seems to have clusters of  seizures (during an office visit there were about 8 tonic seizures lasting 15-20 seconds). We weaned her off of valproic acid and increased Epidiolex.   June 2019, hospitalized for status epilepticus (multiple clinical seizures, every 5-6 minutes, associated with apnea). Continuous EEG monitoring showed very frequent 15-30 seconds tonic seizures , whether this is different from her baseline is difficult to determine. Since she was on continuous EEG monitoring a number of medication trials were given. At one point it seems that lorazepam and more levetiracetam had improved seizures. Her tube feed was adjust to more protein and lower carbohydrates.  2019 - multiple phone calls regarding recurrent seizures (generalized shaking, tonic body rigidity and clonic activity)     Summary 2020  RUF [id number], -250, LEV 1000 q8hr, -400, JESSICA 50-50, CLB 5 qHS. Replacement of her VNS in December 2019, went from M105 to  model.  Seizure reporting had been sporadic. Despite medication adjustments, there has been no improvement in degree of wakefulness, nonverbal, calls out at times. She does not do much.  Transitioned LEV to Brivaracetam. Increased clobazam to 10mg at bedtime on 5/28/2020. Unclear if there has been improvement in seizure frequency; but there were no admissions to hospital for seizures.     Summary 2021  RUF [id number], -250, -400, JESSICA 75-75, CLB 5-10. She was admitted to Hospital on 2/2/2021 for multiple seizures in one day. Onfi was increased with an extra 5mg tab in AM. With the increase in Onfi, there has been no report of excessive sedation. She continues to have stretches of non-24 hour sleep cycle. She has periods when she appears to be active, smiling and laughter and periods when she has no interactions with those around her.  Her seizures are very difficult to notice, unless someone is with her. We had tried to reduce her Brivaracetam dose as there was no clear improvement at  higher dose and she has been losing weight. We started Vimpat at the end of the year.     Summary 2022  RUF [id number], -250, -400, BRV 50-50, CLB 5-10.   Often seems to be sleepy. She is sometimes on a play mat, she mostly lays there, sometimes she plays with her toys. She may scream or yell throughout the day. One time she was able to say her name and clap her hands and laugh at something. She has moments of increased alertness and awareness.   She continues to have occasional witnessed seizures (body and head rigidity, eyes rolling up, sometimes mild twitching of the arms). The last time a rescue medication was needed was back in September 2021.   We tried to reduce her dose of clobazam to reduce sedation and replace it with lacosamide.     Summary 2023  RUF [id number], -250, -400, BRV 50-50, CLB 10, -150. She continues to have episodic seizures. Seizures involved whole body shaking, eyes fixed, drooling. Her clobazam was increased when there was a cluster of seizures. She has variable degree of wakefulness, sometimes she is wild and screaming, sometimes she is lethargic. Most of her seizures are the tonic seizure type. No report of tonic-clonic seizures.      Interval History 1/25/2024  RUF [id number], -250, -400, JESSICA 50-50, -200, CLB 10 qHS. She was last seen by Li Vance on 8/16/2023 - there were 2 documented seizrues, body tenses up, face read, drooling. Seizures involve tense body, arms flexed, face red, and labored breathing.   No changes were made.     Since her last visit, she had three seizures in December (12/3 and 12/25).  She was admitted to hospital on 12/7 due to dislodged G-tube, she was not able to get her antiseizure medications while she did not have a G-tube, so she had a cluster of 6-7 generalized tonic clonic seizures (upper body stiffening and shaking and unresponsiveness).  She had an EEG study that showed multiple tonic seizures.    [place] -   She had seizures on  needed diazepam to stop seizure.  She had seizure-like activiey on 10/29 2 episodes within 3 minute (lasted 10 seconds, tense and tight, with repetitive shaking of arms and face turning red).    Interval History 10/25/2024  Spoke with ZEE Acuña on the unit. There have been no report of recent seizures.  Hospitalizations  End of  there was a seizure that lasted 3 minutes.   2024 - 10-15 seziures in one day, found to need a VNS battery change.  2024 - 5 seizures within a 90 minute period.  She was having recurrent seizures every 3 minutes starting around 2024 every couple of days, then on 2024, she had continuous seizures, no improvement with diazepam. Mostly tonic seizures and body arching.  -10/1/2024 - seizures possibly triggered by UTI  Arianne reports that there have been no seizures since she has returned to the residential facility.     Special Features  Status epilepticus: yes  Self Injury Seizures: No  Precipitating Factors: menstrual cycle??     Epilepsy Risk Factors:  Abnormal pregnancy: unknown  Abnormal birth/: unknown  Abnormal Development: unknown developmental history  Febrile seizures, simple: unknown  Febrile seizures, complex: unknown  CNS infection: No  Mental retardation: Yes  Cerebral palsy: Yes  Head injury (moderate/severe): possibly anoxic brain injury  CNS neoplasm: No  CNS malformation: No  Neurosurgical procedure: No  Stroke: No  Alcohol abuse: No  Drug abuse: No  Family history Sz/epilepsy: unknown     Prior AEDs:  Rufinamide  Clobazam (excessive sedation)  Clonazepam  Levetiracetam (unclear if beneficial)  Lamotrigine (unclear if beneficial)  Topiramate (missed doses caused breakthrough convulsive type of seizures)  Valproate (elevated ammonia levels)  Fycompa (excess sedation)  Felbamate (listed as a drug allergy)  Phenobarbital (contributing to sedation)  Epidiolex (seems to help the  most)  Brivaracetam  Lacosamide     Prior workup:  x   Imaging:  CT head 2/21/2013, 9/7/2018  No acute intracranial pathology     3/20/2019  MRI brain w/wo contrast  Normal MR brain study  Symmetric hippocampal formations     EEGs:  Continuous video EEG monitoring 10/13 - 10/15/2017  Frequent generalized spike/polyspike-slow wave complexes during sleep  At times there are independent right more than left midtemporal spikes and bitemporal spikes     Innumerable generalized tonic seizures during sleep, generalized 1 Hz period discharges, that would become continuous for 30 seconds or generalized rhythmic alpha-beta discharges, associated with patient becoming rigid, tonic posturing with arms elevated and tense with subtle jerking of the upper body, eyes opening with upward deviation.  Ictal patterns:  1 - Seconds of diffuse electrodecrement then paroxysmal fast activity followed by 2Hz spike wave discharges (clinically accompanied by posturing of the arms, then clonic jerking)     Continuous video EEG monitoring 10/18 - 10/25/2017  Diffuse background slowing with bursts of arrhythmic generalized spike wave discharges, with shifting lateralization, and independent left and right temporal spikes  Mild eyelid myoclonia associated with brief bursts of 2-2.5 Hz generalized discharges  Tonic seizures with eelctrodecrement  There were innumerable tonic seizures; however by 10/25/2017 the frequency of these seizures did decrease in frequency.     Continuous video EEG monitoring 12/1-12/5/2017  There are innumerable tonic seizures that are generally less than one minute in duration (30-40 seconds), these consists of subtle eye opening and tonic stiffening of arms, if longer then whole body stiffening with clonic jerking movements. These almost always occur exclusively out of sleep. These consist of 2-2.5 Hz generalized spike-slow wave complexes with generalized attenuation of activity (desynchronization) or paroxysmal fast  activity. Per 24 hours count of seizures could be .     12/19/2018 routine study  Frequent recurrent tonic seizures associated with paroxysmal generalized fast activity then generalized rhythmic discharges associated with eyes upward deviation, and myoclonic/clonic jerking of the upper body, excess beta activity.     6/28-7/3/2019 continuous video EEG monitoring  Frequent clusters of tonic seizures (body rigidity, head flexions, eyes go up with dysconjugate gaze, and clonic activity of the arms for a few seconds), these are associated with GPFA and rhythmic spike-slow waves.  There are also bilateral temporal focal epileptiform discharges.     12/8/2023  Nine episodes of bursts of high amplitude 15Hz GPFA, lasting 7-10 seconds, correlated with mild partial eye opening.  Background is disorganized, theta-delta slowing and intermixed faster activities.          Review of Systems   Constitutional:  Positive for fatigue. Negative for activity change and fever.   HENT:  Negative for congestion and sneezing.    Respiratory:  Negative for cough.    Gastrointestinal:  Negative for constipation, diarrhea, nausea and vomiting.   Endocrine: Negative.    Musculoskeletal:  Positive for gait problem.   Skin: Negative.  Negative for rash.   Neurological:  Positive for seizures. Negative for tremors, syncope and weakness.   Hematological: Negative.    Psychiatric/Behavioral:  Negative for sleep disturbance.     I have personally reviewed the MA's review of systems and made changes as necessary.    Current Outpatient Medications on File Prior to Visit   Medication Sig Dispense Refill    acetaminophen (TYLENOL) 325 mg tablet Take 650 mg by mouth every 4 (four) hours as needed for mild pain or fever      bisacodyl (DULCOLAX) 10 mg suppository Insert 10 mg into the rectum daily      Brivaracetam (Briviact) 100 MG TABS 1 tablet (100 mg total) by Per PEG Tube route 2 (two) times a day 60 tablet 5    cannabidiol (Epidiolex) 100  "mg/ml SOLN 5 mL (500 mg total) by Per G Tube route 2 (two) times a day 300 mL 5    cloBAZam (Onfi) 10 MG tablet 1 tablet (10 mg total) by Per G Tube route 2 (two) times a day 60 tablet 5    diazepam (VALIUM) 5 MG/ML solution If the patient has a seizure lasting more than 3 minutes then give 1mL by PEG tube, may repeat 1mL if she continues to have seizure for more than 10 minutes. 30 mL 0    lacosamide (VIMPAT) 200 mg tablet 1 tablet (200 mg total) by Per PEG Tube route every 12 (twelve) hours 60 tablet 5    magnesium hydroxide (MILK OF MAGNESIA) 400 mg/5 mL oral suspension 30 mL by Per G Tube route daily as needed for constipation      Probiotic Product (ALEXANDER-BID PROBIOTIC PO) 1 tablet by Per G Tube route daily      rufinamide (BANZEL) 400 mg tablet 4 tablets (1,600 mg total) by Per G Tube route every 12 (twelve) hours 240 tablet 5    topiramate (TOPAMAX) 50 MG tablet 5 tablets (250 mg total) by Per G Tube route every 12 (twelve) hours 300 tablet 5    VITAMIN D PO Take 1,000 mg by mouth in the morning Given in her G-tube       No current facility-administered medications on file prior to visit.         Objective   BP 91/67 (BP Location: Right arm, Patient Position: Supine, Cuff Size: Standard)   Pulse 85   Temp 97.6 °F (36.4 °C) (Temporal)   Resp 18   Ht 5' 1\" (1.549 m)   Wt 46.3 kg (102 lb)   SpO2 98%   BMI 19.27 kg/m²     Physical Exam  Constitutional:       Comments: Facial dysmorphia of intellectual disability.  Lying on stretcher, awake, no acute distress.  Playing with her toy   Eyes:      Extraocular Movements: EOM normal.      Pupils: Pupils are equal, round, and reactive to light.   Psychiatric:      Comments: Overall calm, quiet        Neurological Exam  Mental Status    Patient is non-verbal, unable to assess language, memory, attention etc .    Cranial Nerves  CN III, IV, VI: Extraocular movements intact bilaterally. Pupils equal round and reactive to light bilaterally.  CN VII: Muscles of facial " expression are symmetric.    Motor   No abnormal involuntary movements.  Unable to perform confrontational testing.  On stretcher has legs flexed, but able to resist passive motion of the legs.  She pushes me away with her arms, has a good grasp on her toy.    Sensory  Unable to assess due to intellectual disability .    Reflexes  Not assessed.    Coordination    Patient unable to follow directions for testing .    Gait    Not assesed, on a stretcher.      VNS Settings - Interrogation only  Patient ID: BS  Model: Sentiva [id number]/N [id number]  Implant Date:8/12/24  Output current  2 mA   Signal frequency  20 Hz   Pulse width  500  ?sec   On time  30  sec   Off time  1.1min   Duty Cycle    35 %   Autostim current   2 mA   Autostim Pulse width   500 ?sec   Autostim on time   30sec   Autostim threshold   20 %         Mag current   2.25  mA   Mag pulse width   500 ?sec   Mag on time   60 sec      Battery: %

## 2024-12-09 NOTE — PATIENT INSTRUCTIONS
Medications are given by PEG tube  1 - Continue Epidiolex 100mg/mL 5.0 mL (500mg) every 12 hours  2 - Continue Briviact 100 mg tab one tabe every 12 hours  3 - Continue with Topiramate 50mg tabs give 5 tabs (250mg) every 12 hours  4 - continue Banzel 400mg give 4 tabs every 12 hours  5 - Continue Clobazam 10mg tab give one tab every 12 hours  6 - Continue Lacosamide 200mg tab one tab every 12 hours.  7 - give diazepam 5mg/mL 1 mL as needed for seizure last more than 3 minutes and repeat second dose for seizure lasting more than 10 minutes  8 - Call the office if there is increase in seizure frequency  9 - if there are more seizures; then will need to consider replacing lacosamide with Fintepla (this medication requires every 6 months echocardiogram to evaluate for valvular dysfunction and pulmonary hypertension.  10 - follow up in 3 months

## 2024-12-12 NOTE — ASSESSMENT & PLAN NOTE
Ms. Maira Bull is a 44 y.o. woman with intractable Lennox Gastaut Syndrome, with frequent tonic seizures (especially when she is asleep). These seizures are often short lasting and may go un-noticed. 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 Her EEG studies show both generalized onset and potential risk for focal onset seizures. She requires VNS therapy, as when the VNS battery was running low, she had an increase in seizure frequency and severity. Prior attempts to wean off antiseizure medications have resulted in breakthrough seizures. We had previously tried to wean off of clobazam due to excessive sedation but this has lead to an increase in tonic seizure activity. No medication changes are made during this visit. She is essentially at maximal doses of current antiseizure medications. If she is to continue with seizures, then a new medication will have to be considered.  Dr. España spoke with her brother regarding Fintepla at timing of October visit, but decision was made to hold off since seizures have been stable.    Per her nurse at [facility], seizures have been stable.  there was a seizure in November and she was brought to the ED, with no additional seizures in the ED.  She had just recently been admitted for sepsis and multifocal pneumonia within the week before this happened.      She seems at her baseline for me today.  Was alert, playing with her toy, no acute distress.      Plan:   Medications are given by PEG tube  1 - Continue Epidiolex 100mg/mL 5.0 mL (500mg) every 12 hours  2 - Continue Briviact 100 mg tab one tabe every 12 hours  3 - Continue with Topiramate 50mg tabs give 5 tabs (250mg) every 12 hours  4 - continue Banzel 400mg give 4 tabs every 12 hours  5 - Continue Clobazam 10mg tab give one tab every 12 hours  6 - Continue Lacosamide 200mg tab one tab every 12 hours.  7 - give diazepam 5mg/mL 1 mL as needed for seizure last more than 3 minutes and  repeat second dose for seizure lasting more than 10 minutes  8 - Call the office if there is increase in seizure frequency  9 - if there are more seizures; then will need to consider replacing lacosamide with Fintepla (this medication requires every 6 months echocardiogram to evaluate for valvular dysfunction and pulmonary hypertension.  10 - follow-up in 3 months

## 2025-03-11 ENCOUNTER — OFFICE VISIT (OUTPATIENT)
Dept: NEUROLOGY | Facility: CLINIC | Age: 45
End: 2025-03-11
Payer: MEDICARE

## 2025-03-11 VITALS
RESPIRATION RATE: 18 BRPM | OXYGEN SATURATION: 95 % | WEIGHT: 102 LBS | SYSTOLIC BLOOD PRESSURE: 102 MMHG | HEART RATE: 69 BPM | BODY MASS INDEX: 19.26 KG/M2 | TEMPERATURE: 97.8 F | HEIGHT: 61 IN | DIASTOLIC BLOOD PRESSURE: 50 MMHG

## 2025-03-11 DIAGNOSIS — Z96.89 S/P PLACEMENT OF VNS (VAGUS NERVE STIMULATION) DEVICE: ICD-10-CM

## 2025-03-11 DIAGNOSIS — G40.812 NONINTRACTABLE LENNOX-GASTAUT SYNDROME WITHOUT STATUS EPILEPTICUS (HCC): Primary | ICD-10-CM

## 2025-03-11 PROCEDURE — 99214 OFFICE O/P EST MOD 30 MIN: CPT | Performed by: PHYSICIAN ASSISTANT

## 2025-03-11 NOTE — ASSESSMENT & PLAN NOTE
Ms. Maira Bull is a 44 y.o. woman with intractable Lennox Gastaut Syndrome, with frequent tonic seizures (especially when she is asleep). These seizures are often short lasting and may go un-noticed. 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 Her EEG studies show both generalized onset and potential risk for focal onset seizures. She requires VNS therapy, as when the VNS battery was running low, she had an increase in seizure frequency and severity. Prior attempts to wean off antiseizure medications have resulted in breakthrough seizures. We had previously tried to wean off of clobazam due to excessive sedation but this has lead to an increase in tonic seizure activity. No medication changes are made during this visit. She is essentially at maximal doses of current antiseizure medications. If she is to continue with seizures, then a new medication will have to be considered.  Dr. España spoke with her brother regarding Fintepla at timing of October 2024 visit, but decision was made to hold off since seizures have been stable.     Per her nurse at [facility], seizures have been stable.  There has not been any significant clustering of seizures.  No recent hospitalizations.      She seems at her baseline for me today.  Was alert, playing with her toy, no acute distress.       Plan:   Medications are given by PEG tube  1 - Continue Epidiolex 100mg/mL 5.0 mL (500mg) every 12 hours  2 - Continue Briviact 100 mg tab one tabe every 12 hours  3 - Continue with Topiramate 50mg tabs give 5 tabs (250mg) every 12 hours  4 - continue Banzel 400mg give 4 tabs every 12 hours  5 - Continue Clobazam 10mg tab give one tab every 12 hours  6 - Continue Lacosamide 200mg tab one tab every 12 hours.  7 - give diazepam 5mg/mL 1 mL as needed for seizure last more than 3 minutes and repeat second dose for seizure lasting more than 10 minutes  8 - Call the office if there is increase in seizure  frequency  9 - if there are more seizures; then will need to consider replacing lacosamide with Fintepla (this medication requires every 6 months echocardiogram to evaluate for valvular dysfunction and pulmonary hypertension.  10 - follow-up in 3 months

## 2025-03-11 NOTE — PATIENT INSTRUCTIONS
Plan:   Medications are given by PEG tube  1 - Continue Epidiolex 100mg/mL 5.0 mL (500mg) every 12 hours  2 - Continue Briviact 100 mg tab one tab every 12 hours  3 - Continue with Topiramate 50mg tabs give 5 tabs (250mg) every 12 hours  4 - continue Banzel 400mg give 4 tabs every 12 hours  5 - Continue Clobazam 10mg tab give one tab every 12 hours  6 - Continue Lacosamide 200mg tab one tab every 12 hours.  7 - give diazepam 5mg/mL 1 mL as needed for seizure last more than 3 minutes and repeat second dose for seizure lasting more than 10 minutes  8 - Call the office if there is increase in seizure frequency  9 - if there are more seizures; then will need to consider replacing lacosamide with Fintepla (this medication requires every 6 months echocardiogram to evaluate for valvular dysfunction and pulmonary hypertension.  10 - follow-up in 3 months with Dr. España

## 2025-03-11 NOTE — PROGRESS NOTES
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 Maira Bull is a 44 y.o. woman with intractable Lennox Gastaut Syndrome, with frequent tonic seizures (especially when she is asleep). These seizures are often short lasting and may go un-noticed. 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 Her EEG studies show both generalized onset and potential risk for focal onset seizures. She requires VNS therapy, as when the VNS battery was running low, she had an increase in seizure frequency and severity. Prior attempts to wean off antiseizure medications have resulted in breakthrough seizures. We had previously tried to wean off of clobazam due to excessive sedation but this has lead to an increase in tonic seizure activity. No medication changes are made during this visit. She is essentially at maximal doses of current antiseizure medications. If she is to continue with seizures, then a new medication will have to be considered.  Dr. España spoke with her brother regarding Fintepla at timing of 2024 visit, but decision was made to hold off since seizures have been stable.     Per her nurse at [place], seizures have been stable.  There has not been any significant clustering of seizures.  No recent hospitalizations.      She seems at her baseline for me today.  Was alert, playing with her toy, no acute distress.       Plan:   Medications are given by PEG tube  1 - Continue Epidiolex 100mg/mL 5.0 mL (500mg) every 12 hours  2 - Continue Briviact 100 mg tab one tabe every 12 hours  3 - Continue with Topiramate 50mg tabs give 5 tabs (250mg) every 12 hours  4 - continue Banzel 400mg give 4 tabs every 12 hours  5 - Continue Clobazam 10mg tab  give one tab every 12 hours  6 - Continue Lacosamide 200mg tab one tab every 12 hours.  7 - give diazepam 5mg/mL 1 mL as needed for seizure last more than 3 minutes and repeat second dose for seizure lasting more than 10 minutes  8 - Call the office if there is increase in seizure frequency  9 - if there are more seizures; then will need to consider replacing lacosamide with Fintepla (this medication requires every 6 months echocardiogram to evaluate for valvular dysfunction and pulmonary hypertension.  10 - follow-up in 3 months        S/P placement of VNS (vagus nerve stimulation) device  VNS interrogated today, no settings were changes.               History of Present Illness   HPI   Maira Bull is a 44 y.o.female here for follow-up evaluation of intractable epilepsy in the setting of LGS.      Interval History 3/11/2025  Last seen 12/9/24. No changes were made to her AEDs.      She presents to the office visit unaccompanied today, on a stretcher.  Per chart review, no ED visits or hospital admissions since her last visit 3 months ago.     Today I spoke with ZEE Jefferson at [place]. She says Maira has been doing ok from a seizure standpoint. There have not been any “significant” seizures or clustering of seizures.  She seems to have more seizures around the time of her period, “more eye fluttering”.  She seems awake and alert.  There have not been any issues with her medications.       AED/side effects/compliance:  Banzel 400mg give 4 tabs twice a day   Topiramate 250mg tab twice a day   Epidiolex 500mg twice a day  Briviact 100 mg twice a day  Lacosamide 200-200  Onfi 10-10     Event/Seizure semiology:  Seizure semiology:  She was described to have drop attacks or spells with grunting sounds and falling to the floor.   Her nurse commented on episodes of spasms or myoclonic jerking of the right arm.   Generalized convulsions  Tonic seizures of eyes rolling back (mostly during sleep)     Prior Epilepsy  History:  Collective epilepsy history 11/6/2014 was previously evaluated by Dr. Harper including a hospitalization in February 2013 for status epilepticus, in the setting of missed doses of AEDs due to refusal to eat. She subsequently required anesthetic induced coma to stop her seizures and PEG tube was placed. She was seen almost every month for management of her seizures up until November 2013. She has medically refractory seizures and had a VNS placed possibly in the early 2000s and a generator changed in 2011. Unknown AEDs that were tried but felbamate is listed as an allergy. In 2011, her AEDs were clonazepam, levetiracetam, Depakote and Lamictal. Dr. Harper had started Banzel in 2011. In 2012, Onfi was added with the reduction of clonazepam. There were difficulty with documenting seizure frequency; but seizures were no longer daily occurrences but there were clusters of seizures. There would be good periods and bad periods of seizure frequency. Dr. Harper had been increasing the duty cycle of the VNS over the course of 2012 to 2013. Sometime between August 2013 and October 2013, topiramate was introduced. She was in status epilepticus in 2013, she was on Keppra, Banzel, Onfi, and Lamictal. She had an EEG at some point that showed multifocal spike-wave and background attenuation. She has intermittent agitation and day time somnolence. When she was hospitalized for status epilepticus, she was started on methylphenidate to help wake her up.      Intake history November 2014:  VPA level 127. Her Valproic Acid dose was previously 250mg q6hrs based on Dr. Harper's notes. Since July 2014, she has been receiving VPA 500mg q6hrs. She has not been hospitalized since September 2013. She was previously ambulatory with therapy. She spends most of her time sleeping for the past couple of months. She has also been receiving lactulose for elevated ammonia levels.   (older drugs VPA, LVT, LMG, newer drugs added on since 2011 RFN,  Onfi, TPM)      Summary of 2015:  We decreased VPA from TDD 2000mg to 1000mg with increased agitation/combativeness, alternating days of exhaustion (no behavioral specialist has been involved). are randomly reported by multiple staff members. Seizures are typically reported by CNA's or her one to one on the 3PM to 11PM shift. Seizures are described a brief events of eyes rolling back and arms going up in the air, followed by hair pulling or slapping herself. These seizures were reported as either sporadic or every other day episodes. There have been no documented grand mal seizures or drop attacks. She refuses to wear safety helmet, rips at her hair, and attempts to flip herself out of her chair and bed. Maira can walk briefly but walks on her toes, she frequently will allow herself to fall down when she does not want to walk.  It does not appear that methylphenidate has been useful in maintaining her level of wakefulness during the day.    VNS generator change on 11/3/2015. The Model 103 (serial #[id number]) was replaced with [employer] Model 105, serial #[id number]. Weaned off of levetiracetam due to multiple medications and agitation; by the end of 2016, she was down to -500.       Summary of 2016:  Started with RFN [id number], -250, CLB 0.5-0.5-25,  QID, -200 attempted to wean off of LEV and reduced the dose of VPA given that there were no reports of seizures and she was sedated, along with elevated ammonia levels. It was unclear as to how effective LEV was for her seizure control. When she missed a dose of TPM in September 2016, she had multiple seizures. We attempted to compensate for increase in seizures by increasing Onfi to 20-20; however, it seems that it made her more sedated.      Summary of 2017  RFN [id number], -250, Onfi 20-20, -200,  q8hrs. She has been more somnolent. She continues to have tonic seizures when she is asleep, eyes rolling body, arms will tense up  with some twitching, last a few seconds, but will recur. There are no seizures during the daytime. We attempted to wean off of VPA due to ammonia / somnolence; but there was an increase in tonic seizures (tonic stiffness, eyes rolling upwards, and subtle arm (right?) jerking).      Hospital admission 10/10-10/26/2017, due to seizures in setting of infection, was put on continuous video EEG monitoring to determine her seizure type, seizure frequency, and rapid medication adjustments. Rapid med changes: d/c'ed lamotrigine, restarted levetiracetam, attempted discontinuation of valproic acid (more seizures, so restarted), attempted reduction in Onfi, and starting Fycompa. The patient's seizures were mostly based during sleep, tonic seizures.      Summary of 2018  Started with RFN [id number], PER 8, LEV 7900-2875, CLB 5-15, -250,  TID. Due to excessive somnolence Onfi was weaned off. She was on continuous EEG monitoring when she was in hospital for PNA that showed very frequent tonic seizures during sleep. VPA is causing hyperammoniemia. She has had more admissions for somnolence / lethargy, VPA was reduced for transaminitis. She was tested for inborn error of metabolism with plasma amino acid, urine organic acid, and acylcarnitine levels. There were nonspecific elevations in some amino acid or organic acid but no pattern consistent with a specific inborn error of metabolism. Elevated carnitine level was consistent with supplementation. Higher doses of phenobarbital may also contribute to sedation, phenobarbital was reduced to 20mg but on follow-up she was on 20mL of oral solution (80mg / day).     There is variable reporting in the frequency of tonic seizures (these are the eyes are rolling back and shaking, then stop, then happen again in 10-15 minutes). Her level of alertness is also variable with erratic sleep cycle.     Summary 2019  We started the year with  TID, RFN [id number], -200, LEV  [id number], PER 10, PB 40 qHS. In December 2018, Hospital admission for septic shock and aspiration PNA. She had an EEG that captured recurrent tonic seizures during sleep but this is consistently found in all of her other EEG studies. Another hospital admission January 2019 for recurrent convulsive seizures.  Due to sedation Phenobarbital was weaned off and Fycompa was increased to 10mg at bedtime. TPM increased to 250-250. Tried to wean off of VPA due to ammonia levels. We started Epidiolex in the Spring 2019, which seems to have improved seizure control.     January 2019 - Her CNA reports that Maira is no longer Maira, she is essentially bed ridden. She does not eat and does not walk. Maira usually participate in activities, walking, and eating food (if she was at a [employer] she would be able to eat a hamburger without difficulty). I was able to speak to her father Oneil and he reported that he too saw a significant decline in mental status. There was a phone call from [facility] reporting an increase in seizure activity (brief episodes tonic-myoclonic jerking); but subsequent reports did not notice an increase in activity.      Weaned off of phenobarbital, suspecting that it was causing elevated transaminases. There is variable periods of wakefulness and alertness; seizures are reported sporadically, sometimes she seems to have clusters of seizures (during an office visit there were about 8 tonic seizures lasting 15-20 seconds). We weaned her off of valproic acid and increased Epidiolex.   June 2019, hospitalized for status epilepticus (multiple clinical seizures, every 5-6 minutes, associated with apnea). Continuous EEG monitoring showed very frequent 15-30 seconds tonic seizures , whether this is different from her baseline is difficult to determine. Since she was on continuous EEG monitoring a number of medication trials were given. At one point it seems that lorazepam and more levetiracetam had  improved seizures. Her tube feed was adjust to more protein and lower carbohydrates.  2019 - multiple phone calls regarding recurrent seizures (generalized shaking, tonic body rigidity and clonic activity)     Summary 2020  RUF [id number], -250, LEV 1000 q8hr, -400, JESSICA 50-50, CLB 5 qHS. Replacement of her VNS in December 2019, went from M105 to  model.  Seizure reporting had been sporadic. Despite medication adjustments, there has been no improvement in degree of wakefulness, nonverbal, calls out at times. She does not do much.  Transitioned LEV to Brivaracetam. Increased clobazam to 10mg at bedtime on 5/28/2020. Unclear if there has been improvement in seizure frequency; but there were no admissions to hospital for seizures.     Summary 2021  RUF [id number], -250, -400, JESSICA 75-75, CLB 5-10. She was admitted to Hospital on 2/2/2021 for multiple seizures in one day. Onfi was increased with an extra 5mg tab in AM. With the increase in Onfi, there has been no report of excessive sedation. She continues to have stretches of non-24 hour sleep cycle. She has periods when she appears to be active, smiling and laughter and periods when she has no interactions with those around her.  Her seizures are very difficult to notice, unless someone is with her. We had tried to reduce her Brivaracetam dose as there was no clear improvement at higher dose and she has been losing weight. We started Vimpat at the end of the year.     Summary 2022  RUF [id number], -250, -400, BRV 50-50, CLB 5-10.   Often seems to be sleepy. She is sometimes on a play mat, she mostly lays there, sometimes she plays with her toys. She may scream or yell throughout the day. One time she was able to say her name and clap her hands and laugh at something. She has moments of increased alertness and awareness.   She continues to have occasional witnessed seizures (body and head rigidity, eyes rolling up, sometimes  mild twitching of the arms). The last time a rescue medication was needed was back in September 2021.   We tried to reduce her dose of clobazam to reduce sedation and replace it with lacosamide.     Summary 2023  RUF [id number], -250, -400, BRV 50-50, CLB 10, -150. She continues to have episodic seizures. Seizures involved whole body shaking, eyes fixed, drooling. Her clobazam was increased when there was a cluster of seizures. She has variable degree of wakefulness, sometimes she is wild and screaming, sometimes she is lethargic. Most of her seizures are the tonic seizure type. No report of tonic-clonic seizures.      Interval History 1/25/2024  RUF [id number], -250, -400, JESSICA 50-50, -200, CLB 10 qHS. She was last seen by Li Vance on 8/16/2023 - there were 2 documented seizrues, body tenses up, face read, drooling. Seizures involve tense body, arms flexed, face red, and labored breathing.   No changes were made.     Since her last visit, she had three seizures in December (12/3 and 12/25).  She was admitted to hospital on 12/7 due to dislodged G-tube, she was not able to get her antiseizure medications while she did not have a G-tube, so she had a cluster of 6-7 generalized tonic clonic seizures (upper body stiffening and shaking and unresponsiveness).  She had an EEG study that showed multiple tonic seizures.   [place] -   She had seizures on 11/13 needed diazepam to stop seizure.  She had seizure-like activiey on 10/29 2 episodes within 3 minute (lasted 10 seconds, tense and tight, with repetitive shaking of arms and face turning red).     Interval History 10/25/2024  Spoke with ZEE Acuña on the unit. There have been no report of recent seizures.  Hospitalizations  End of June there was a seizure that lasted 3 minutes.   8/6/2024 - 10-15 seziures in one day, found to need a VNS battery change.  8/31/2024 - 5 seizures within a 90 minute period.  She was having  "recurrent seizures every 3 minutes starting around 2024 every couple of days, then on 2024, she had continuous seizures, no improvement with diazepam. Mostly tonic seizures and body arching.  -10/1/2024 - seizures possibly triggered by UTI  Arianne reports that there have been no seizures since she has returned to the residential facility.    Interval History 2024  Last seen by Dr. España on 10/25/24. No changes were made to her AEDs. He did speak with patient's brother regarding possibility of Fintepla. Decision was made to hold off on this medication.     She presents to the office visit unaccompanied today, on a stretcher.  Per chart review, she was admitted 24-24, initially presenting with PEG tube dysfunction, but incidentally found to have sepsis and found to have multifocal pneumonia.no seizures reported during admission  There is an ED visit on 11/15/24 for a seizure. There were no seizures in the ED, she remained stable. No changes were made.     Today I spoke with ZEE Landry at [place]. She says Maira has been doing ok from a seizure standpoint. There were no reported seizures after the ED visit. Overall however, they feel Maira has been declining from a medical standpoint. She has had low BPs, more lethargic, pale, \"looks sickly\". She notes on  they noticed she had expiratory wheezes. She is now on Mucinex and PRN neb treatments.      Special Features  Status epilepticus: yes  Self Injury Seizures: No  Precipitating Factors: menstrual cycle??     Epilepsy Risk Factors:  Abnormal pregnancy: unknown  Abnormal birth/: unknown  Abnormal Development: unknown developmental history  Febrile seizures, simple: unknown  Febrile seizures, complex: unknown  CNS infection: No  Mental retardation: Yes  Cerebral palsy: Yes  Head injury (moderate/severe): possibly anoxic brain injury  CNS neoplasm: No  CNS malformation: No  Neurosurgical procedure: No  Stroke: No  Alcohol " abuse: No  Drug abuse: No  Family history Sz/epilepsy: unknown     Prior AEDs:  Rufinamide  Clobazam (excessive sedation)  Clonazepam  Levetiracetam (unclear if beneficial)  Lamotrigine (unclear if beneficial)  Topiramate (missed doses caused breakthrough convulsive type of seizures)  Valproate (elevated ammonia levels)  Fycompa (excess sedation)  Felbamate (listed as a drug allergy)  Phenobarbital (contributing to sedation)  Epidiolex (seems to help the most)  Brivaracetam  Lacosamide     Prior workup:  x   Imaging:  CT head 2/21/2013, 9/7/2018  No acute intracranial pathology     3/20/2019  MRI brain w/wo contrast  Normal MR brain study  Symmetric hippocampal formations     EEGs:  Continuous video EEG monitoring 10/13 - 10/15/2017  Frequent generalized spike/polyspike-slow wave complexes during sleep  At times there are independent right more than left midtemporal spikes and bitemporal spikes     Innumerable generalized tonic seizures during sleep, generalized 1 Hz period discharges, that would become continuous for 30 seconds or generalized rhythmic alpha-beta discharges, associated with patient becoming rigid, tonic posturing with arms elevated and tense with subtle jerking of the upper body, eyes opening with upward deviation.  Ictal patterns:  1 - Seconds of diffuse electrodecrement then paroxysmal fast activity followed by 2Hz spike wave discharges (clinically accompanied by posturing of the arms, then clonic jerking)     Continuous video EEG monitoring 10/18 - 10/25/2017  Diffuse background slowing with bursts of arrhythmic generalized spike wave discharges, with shifting lateralization, and independent left and right temporal spikes  Mild eyelid myoclonia associated with brief bursts of 2-2.5 Hz generalized discharges  Tonic seizures with eelctrodecrement  There were innumerable tonic seizures; however by 10/25/2017 the frequency of these seizures did decrease in frequency.     Continuous video EEG  monitoring 12/1-12/5/2017  There are innumerable tonic seizures that are generally less than one minute in duration (30-40 seconds), these consists of subtle eye opening and tonic stiffening of arms, if longer then whole body stiffening with clonic jerking movements. These almost always occur exclusively out of sleep. These consist of 2-2.5 Hz generalized spike-slow wave complexes with generalized attenuation of activity (desynchronization) or paroxysmal fast activity. Per 24 hours count of seizures could be .     12/19/2018 routine study  Frequent recurrent tonic seizures associated with paroxysmal generalized fast activity then generalized rhythmic discharges associated with eyes upward deviation, and myoclonic/clonic jerking of the upper body, excess beta activity.     6/28-7/3/2019 continuous video EEG monitoring  Frequent clusters of tonic seizures (body rigidity, head flexions, eyes go up with dysconjugate gaze, and clonic activity of the arms for a few seconds), these are associated with GPFA and rhythmic spike-slow waves.  There are also bilateral temporal focal epileptiform discharges.     12/8/2023  Nine episodes of bursts of high amplitude 15Hz GPFA, lasting 7-10 seconds, correlated with mild partial eye opening.  Background is disorganized, theta-delta slowing and intermixed faster activities.      Review of Systems   Constitutional: Negative.  Negative for fatigue and fever.   HENT: Negative.  Negative for hearing loss, tinnitus and trouble swallowing.    Eyes:  Negative for photophobia, pain and visual disturbance.   Respiratory: Negative.  Negative for cough and shortness of breath.    Cardiovascular: Negative.  Negative for chest pain and palpitations.   Gastrointestinal:  Negative for constipation, diarrhea, nausea and vomiting.   Endocrine: Negative.    Genitourinary: Negative.  Negative for difficulty urinating and urgency.   Musculoskeletal: Negative.  Negative for back pain, gait problem  and neck pain.   Skin: Negative.  Negative for rash.   Neurological:  Positive for seizures (stable, no worsening). Negative for dizziness, tremors, syncope, speech difficulty, weakness, numbness and headaches.   Hematological: Negative.    Psychiatric/Behavioral:  Negative for decreased concentration and sleep disturbance. The patient is not nervous/anxious.     I have personally reviewed the MA's review of systems and made changes as necessary.    Current Outpatient Medications on File Prior to Visit   Medication Sig Dispense Refill    acetaminophen (TYLENOL) 325 mg tablet Take 650 mg by mouth every 4 (four) hours as needed for mild pain or fever      bisacodyl (DULCOLAX) 10 mg suppository Insert 10 mg into the rectum daily      Brivaracetam (Briviact) 100 MG TABS 1 tablet (100 mg total) by Per PEG Tube route 2 (two) times a day 60 tablet 5    cannabidiol (Epidiolex) 100 mg/ml SOLN 5 mL (500 mg total) by Per G Tube route 2 (two) times a day 300 mL 5    cloBAZam (Onfi) 10 MG tablet 1 tablet (10 mg total) by Per G Tube route 2 (two) times a day 60 tablet 5    diazepam (VALIUM) 5 MG/ML solution If the patient has a seizure lasting more than 3 minutes then give 1mL by PEG tube, may repeat 1mL if she continues to have seizure for more than 10 minutes. 30 mL 0    lacosamide (VIMPAT) 200 mg tablet 1 tablet (200 mg total) by Per PEG Tube route every 12 (twelve) hours 60 tablet 5    magnesium hydroxide (MILK OF MAGNESIA) 400 mg/5 mL oral suspension 30 mL by Per G Tube route daily as needed for constipation      Probiotic Product (ALEXANDER-BID PROBIOTIC PO) 1 tablet by Per G Tube route daily      rufinamide (BANZEL) 400 mg tablet 4 tablets (1,600 mg total) by Per G Tube route every 12 (twelve) hours 240 tablet 5    topiramate (TOPAMAX) 50 MG tablet 5 tablets (250 mg total) by Per G Tube route every 12 (twelve) hours 300 tablet 5    VITAMIN D PO Take 1,000 mg by mouth in the morning Given in her G-tube       No current  "facility-administered medications on file prior to visit.         Objective   /50 (BP Location: Right arm, Patient Position: Supine, Cuff Size: Standard)   Pulse 69   Temp 97.8 °F (36.6 °C) (Temporal)   Resp 18   Ht 5' 1\" (1.549 m)   Wt 46.3 kg (102 lb)   SpO2 95%   BMI 19.27 kg/m²     Physical Exam  Constitutional:       Comments: Facial dysmorphia of intellectual disability.  Lying on stretcher, awake, no acute distress.  Playing with her toy   Eyes:      Extraocular Movements: EOM normal.      Pupils: Pupils are equal, round, and reactive to light.   Psychiatric:      Comments: Overall calm, quiet        Neurological Exam  Mental Status    Patient is non-verbal, unable to assess language, memory, attention etc .    Cranial Nerves  CN III, IV, VI: Extraocular movements intact bilaterally. Pupils equal round and reactive to light bilaterally.  CN VII: Muscles of facial expression are symmetric.    Motor   No abnormal involuntary movements.  Unable to perform confrontational testing.  On stretcher has legs flexed, but able to resist passive motion of the legs.  She pushes me away with her arms, has a good grasp on her toy.    Sensory  Unable to assess due to intellectual disability .    Reflexes  Not assessed.    Coordination    Patient unable to follow directions for testing .    Gait    Not assesed, on a stretcher.      VNS Settings - Interrogation only  Patient ID:   Model: Kishanva [id number] S/N [id number]  Implant Date:8/12/24  Output current  2 mA   Signal frequency  20 Hz   Pulse width  500  ?sec   On time  30  sec   Off time  1.1min   Duty Cycle    35 %   Autostim current   2 mA   Autostim Pulse width   500 ?sec   Autostim on time   30sec   Autostim threshold   20 %         Mag current   2.25  mA   Mag pulse width   500 ?sec   Mag on time   60 sec      Battery: %  "

## 2025-03-26 ENCOUNTER — TRANSCRIBE ORDERS (OUTPATIENT)
Dept: RADIOLOGY | Facility: HOSPITAL | Age: 45
End: 2025-03-26

## 2025-03-26 DIAGNOSIS — K76.0 FATTY INFILTRATION OF LIVER: Primary | ICD-10-CM

## 2025-03-26 DIAGNOSIS — T85.528A DISLODGED GASTROSTOMY TUBE: Primary | ICD-10-CM

## 2025-03-31 ENCOUNTER — HOSPITAL ENCOUNTER (OUTPATIENT)
Dept: RADIOLOGY | Facility: HOSPITAL | Age: 45
Discharge: HOME | End: 2025-03-31
Attending: RADIOLOGY | Admitting: INTERNAL MEDICINE
Payer: MEDICARE

## 2025-03-31 DIAGNOSIS — T85.528A DISLODGED GASTROSTOMY TUBE: ICD-10-CM

## 2025-03-31 PROCEDURE — 49450 REPLACE G/C TUBE PERC: CPT | Performed by: INTERNAL MEDICINE

## 2025-03-31 PROCEDURE — 49450 REPLACE G/C TUBE PERC: CPT

## 2025-03-31 PROCEDURE — C1769 GUIDE WIRE: HCPCS

## 2025-03-31 RX ORDER — LIDOCAINE HYDROCHLORIDE 20 MG/ML
JELLY TOPICAL AS NEEDED
Status: COMPLETED | OUTPATIENT
Start: 2025-03-31 | End: 2025-03-31

## 2025-03-31 RX ADMIN — IOHEXOL 20 ML: 350 INJECTION, SOLUTION INTRAVENOUS at 10:34

## 2025-03-31 RX ADMIN — LIDOCAINE HYDROCHLORIDE 1 APPLICATION: 20 JELLY TOPICAL at 10:27

## 2025-03-31 NOTE — BRIEF OP NOTE (RAD/CATH)
IR GASTROJEJUNOSTOMY (GJ) TUBE CHECK/CHANGE/REINSERTION/UPSIZE Procedure Note    PATIENT NAME: Maira Bull  : 1980  MRN: [id number]    Pre-op Diagnosis:   1. Dislodged gastrostomy tube      Post-op Diagnosis:   1. Dislodged gastrostomy tube        Surgeon:   Larry Townsend MD    Assistants:     Migue Busby MD, PGY-2    Estimated Blood Loss: 0 mL    Findings: Successful gastrostomy tube exchange. 16F    Specimens: None.    Complications:  None immediate.    Anesthesia: none    Migue Busby IV, MD     Date: 3/31/2025  Time: 10:30 AM

## 2025-03-31 NOTE — DISCHARGE INSTRUCTIONS
Feeding Tube Use & Care     This handout explains how to properly care for feeding tubes and prevent problems like   irritation, infection, or clogging.     Basic Tips for Using a Feeding Tube      During feedings and when giving medications through the tube, sit up or position yourself so   your shoulders are higher than your stomach. Do not lie flat on your back. Sitting upright and   having your shoulders higher than your stomach can help reduce the risk for food accidentally   getting into the lungs (aspiration) and stomach acid and food backing up from the stomach   (reflux).      Store unopened formula for tube feeding at room temperature. Do not let it freeze.      Opened cans or containers of formula should be labeled with the date that it was opened,   covered and stored in the refrigerator. Proper storage will reduce the risk for contamination   and prevent spoilage. Throw away all unused formula from opened cans or containers after 24   hours.      If the formula is stored in the refrigerator, allow the formula to stand at room temperature   for 15 to 20 minutes before a feeding. Very cold liquids may upset the stomach.      Always wash your hands with mild soap and warm water before touching the feeding   equipment, the formula, or giving medications.      Keep the tube and the area around the tube clean.      Always flush the feeding tube with water before and after each feeding, and before and after   administering medications. Flushing the tube keeps it clean and prevents the tube from   clogging.      Call your health care team if you have diarrhea, constipation, nausea, vomiting, or skin   irritation.                      How to Clean Around Your Feeding Tube     Follow these directions every day to clean around your feeding tube:     1. Wash your hands with mild soap and water and dry them with a clean towel.     2. Wet a soft, clean cloth or gauze with warm, soapy water.     3. Gently wipe the  skin around the tube with the cloth. Also clean the base of the tube.     4. Carefully clean the skin under the bumper with the cloth. Do not pull on the feeding   tube.     5. Look for redness, swelling, bleeding, or leakage around the tube. If you notice any of   these things, report them to your health care team immediately. Do not wait another   day.     6. Rinse the area you cleaned with clear, warm water. (It is also safe to rinse off in the   shower.)     7. Pat the area dry with another clean, soft cloth.     8. Apply a protective skin barrier or antibacterial ointment to the area around tube if   you have been told to do so by your health care team.     9. For PEG tubes or G-Tubes Only - Gently push the base of the tube against the skin and   twist the tube in a [place]. This step keeps the tube from sticking to the inside of the   stomach. Never twist a J-Tube or Jejunostomy tube.     10. When you are finished, wash your hands again with mild soap and water and dry   them with a clean towel.                                How to Flush Your Feeding Tube     To keep your tube clean and unclogged, you must flush it with warm water before and after   feedings, and before and after medications are administered. Flushes also help you stay   hydrated. If you are not currently using your feeding tube for feedings or medications, you will   need to flush your feeding tube with 60 mL water at least once a day to keep the feeding tube   clean and working.     Use the following steps to flush your feeding tube before and after feedings. (Instructions for   flushing your tube when administering medications are covered in the next section.)     1. Wash your hands with mild soap and water and dry them with a clean towel.     2. Fill a clean container with warm water.     3. Place the tip of a large feeding syringe in the water.     4. Draw 60 milliliters (mL) of water into the syringe.     5. Pinch or bend the end of the  feeding tube to keep the contents from leaking out.     6. Open the cap on the feeding tube.     7. Insert the tip of the syringe into the feeding tube.     8. Unbend the tube to allow the water to flow in.     9. Gently and slowly push the plunger of the syringe down.     10. When the syringe is empty, pinch or bend tube to keep contents from leaking out.   Then remove the syringe from the tube and close the cap on the feeding tube.     11. Tape the tube to your stomach with medical paper tape.     12. Wash your hands with mild soap and water and dry them with a clean towel.                      How to Administer Medications Through a Feeding   Tube     General Tips      Check with your health care team before you take any medication through a feeding tube.   Some medications should not be crushed, and a different prescription or form of the drug may   be needed for use in a feeding tube.      Do not mix medications together. Give each medication separately through the feeding tube   and flush the tube with 30 milliliters (mL) of warm water before and after each medication.      Solid medications must be crushed before given through your feeding tube. Thoroughly crush   the medication and dissolve it in 60 mL of water (see step 2 in the instructions below). Never   use tube feeding formula to dissolve a medication.     . Some medications should not be crushed, and a different prescription or form of   the drug may be needed for use in a feeding tube.   . Do not use the feeding tube for medications that should not be crushed (such as   time-release medications).        If you need to take medications at the same time as your tube feeding, give the medications   half-way through the feeding. Make sure your hands are clean before administering   medications. Remember to never mix medications with tube feeding formula.          Step-by-Step Instructions     Follow these directions for taking medications through a  feeding tube:     1. Prepare each liquid medication, and put it in a reachable but safe place.     2. Separately prepare each medication that needs to be crushed before it is   administered. Crush the medication and place it in 60 milliliters (mL) of warm water for   5 minutes before giving it through your feeding tube. Make sure the medication is   thoroughly dissolved in the water before giving it.       3. In a clean container, put enough warm water to flush the tube before and after each   medicine is given. You will need about ¼ cup of water for each medication you give   yourself.     4. To flush the feeding tube:     a. Pull the plunger out of your feeding syringe.     b. Bend or pinch the end of the feeding tube to prevent the contents from   leaking out.     c. Open the cap on the feeding tube.     d. Put the tip of the syringe into the tube.     e. Fill the feeding syringe with 30 mL of warm water.     f. Unbend the feeding tube and let the water run into your feeding tube.     5. After the tube is flushed, pour the liquid or crushed-and-diluted medication into the   feeding syringe.     6. Hold the syringe straight up and let the medication run into the feeding tube.     7. Repeat step 4 to flush your tube again.     8. Repeat steps 2 through 7 for each medication, giving water, then the medication,   then more water.     9. Remove the feeding syringe and close the feeding tube cap.

## 2025-04-01 ENCOUNTER — PREP FOR PROCEDURE (OUTPATIENT)
Dept: INTERVENTIONAL RADIOLOGY/VASCULAR | Facility: CLINIC | Age: 45
End: 2025-04-01

## 2025-04-01 DIAGNOSIS — T85.528A DISLODGED GASTROSTOMY TUBE: Primary | ICD-10-CM

## 2025-05-30 ENCOUNTER — TELEPHONE (OUTPATIENT)
Dept: NEUROLOGY | Facility: CLINIC | Age: 45
End: 2025-05-30

## 2025-05-30 NOTE — TELEPHONE ENCOUNTER
made a call for appt reminder with Dr. España  6/3/25 @10:00am  and also mentioned [place] office address & phone number as well.  informed arrive 10 to 15 mins early for the appt.

## 2025-06-02 NOTE — PROGRESS NOTES
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  7 - give diazepam 5mg/mL 1 mL as needed for seizure last more than 3 minutes and repeat second dose for seizure lasting more than 10 minutes  8 - Call the office if there is increase in seizure frequency  9 - prior to next visit check lacosamide, clobazam, and topiramate levels, CMP   Orders:    Brivaracetam (Briviact) 100 MG TABS; 1 tablet (100 mg total) by Per PEG Tube route 2 (two) times a day    cloBAZam (Onfi) 10 MG tablet; 1 tablet (10 mg total) by Per G Tube route 2 (two) times a day    diazepam (VALIUM) 5 MG/ML solution; If the patient has a seizure lasting more than 3 minutes then give 1mL by PEG tube, may repeat 1mL if she continues to have seizure for more than 10 minutes.    lacosamide (VIMPAT) 200 mg tablet; 1 tablet (200 mg total) by Per PEG Tube route every 12 (twelve) hours    rufinamide (BANZEL) 400 mg tablet; 4 tablets (1,600 mg total) by Per G Tube route every 12 (twelve) hours    topiramate (TOPAMAX) 50 MG tablet; 5 tablets (250 mg total) by Per G Tube route every 12 (twelve) hours    Topiramate level; Future    Comprehensive metabolic panel; Future    Clobazam; Future     Lacosamide; Future        Assessment:  Ms. Maira Bull is a 45 y.o. woman with intractable epilepsy and severe intellectual disability and Lennox Gastaut Syndrome.  She has frequent generalized tonic seizures (at least when she is on EEG monitoring).  These seizures can go un-noticed.  Her EEG studies show both generalized onset and potential risk for focal onset seizures.  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  She is on maximal output current of VNS therapy, and mostly exhausted antiseizure medications.  We will hold off on adding on another antiseizure medication unless there is a significant increase in seizure frequency.  Next medication is fenfluramine (Fintepla) but will require enrollment in REMS program and periodic monitoring (echocardiogram) for cardiac valvular disease and pulmonary hypertension.  At this point, we should consider genetic testing for underlying cause of her epilepsy to find if there are additional options for epilepsy management.    I spoke to the patient's brother Oneil Cardenas. ([phone]).  I reviewed the consent form for genetic testing, the possible outcome results after testing, including positive, negative, and VUS results.  Reviewed that with whole exome sequencing there is about 20-40% possibility of finding a relevant genetic variant that may explain her intractable epilepsy and epileptic encephalopathy.  Oneil Cardenas is her POA, should mail consent form to him at [address], [place] [address].     Plan:   Medications are given by PEG tube  1 - Continue Epidiolex 100mg/mL 5.0 mL (500mg) every 12 hours  2 - Continue Briviact 100 mg tab one tabe every 12 hours  3 - Continue Topiramate 50mg tabs give 5 tabs (250mg) every 12 hours  4 - continue Banzel 400mg give 4 tabs every 12 hours  5 - Continue Clobazam 10mg tab give one tab every 12 hours  6 - Continue Lacosamide 200mg tab one tab every 12 hours.  7 - give diazepam 5mg/mL 1 mL as  needed for seizure last more than 3 minutes and repeat second dose for seizure lasting more than 10 minutes  8 - Call the office if there is increase in seizure frequency  9 - prior to next visit check lacosamide, clobazam, and topiramate levels, CMP   10 - will mail to brother [employer] Exome sequencing consent form  11 - follow-up in 3 months with advanced practitioner      Chief Complaint:    Chief Complaint    Follow-up; Seizures         HPI:    Maira Bull is a 45 y.o.female here for follow-up evaluation of intractable epilepsy in the setting of LGS.      Interval History 6/3/2025  She was seen by Li Vance on 12/9/2024 and 3/11/2025 - she continues to have episodic seizures.  She continues to decline, low blood pressure, more lethargic, pale.  I called the nurses' station at [place] [phone] - [facility]; Jessica - there has been no reported seizure activity.  She is awake from time to time and she seems to play with her toys from time to time.  There are no additional concerns regarding her seizures.      No other family member with seizure, no intellectual disability, autism spectrum.  Maternal grandmother with dementia.      AED/side effects/compliance:  Banzel 400mg give 4 tabs twice a day   Topiramate 250mg tab twice a day   Epidiolex 500mg twice a day  Briviact 100 mg twice a day  Lacosamide 200-200  Onfi 10-10    Event/Seizure semiology:  Seizure semiology:  She was described to have drop attacks or spells with grunting sounds and falling to the floor.      Her nurse commented on episodes of spasms or myoclonic jerking of the right arm.    Generalized convulsions  Tonic seizures of eyes rolling back (mostly during sleep)    Prior Epilepsy History:  Collective epilepsy history 11/6/2014 was previously evaluated by Dr. Harper including a hospitalization in February 2013 for status epilepticus, in the setting of missed doses of AEDs due to refusal to eat. She subsequently required anesthetic induced  coma to stop her seizures and PEG tube was placed. She was seen almost every month for management of her seizures up until November 2013. She has medically refractory seizures and had a VNS placed possibly in the early 2000s and a generator changed in 2011. Unknown AEDs that were tried but felbamate is listed as an allergy. In 2011, her AEDs were clonazepam, levetiracetam, Depakote and Lamictal. Dr. Harper had started Banzel in 2011. In 2012, Onfi was added with the reduction of clonazepam. There were difficulty with documenting seizure frequency; but seizures were no longer daily occurrences but there were clusters of seizures. There would be good periods and bad periods of seizure frequency. Dr. Harper had been increasing the duty cycle of the VNS over the course of 2012 to 2013. Sometime between August 2013 and October 2013, topiramate was introduced.  She was in status epilepticus in 2013, she was on Keppra, Banzel, Onfi, and Lamictal. She had an EEG at some point that showed multifocal spike-wave and background attenuation. She has intermittent agitation and day time somnolence. When she was hospitalized for status epilepticus, she was started on methylphenidate to help wake her up.     Intake history November 2014:  VPA level 127.  Her Valproic Acid dose was previously 250mg q6hrs based on Dr. Harper's notes. Since July 2014, she has been receiving VPA 500mg q6hrs. She has not been hospitalized since September 2013. She was previously ambulatory with therapy. She spends most of her time sleeping for the past couple of months.  She has also been receiving lactulose for elevated ammonia levels.   (older drugs VPA, LVT, LMG, newer drugs added on since 2011 RFN, Onfi, TPM)     Summary of 2015:  We decreased VPA from TDD 2000mg to 1000mg with increased agitation/combativeness, alternating days of exhaustion (no behavioral specialist has been involved). are randomly reported by multiple staff members. Seizures are  typically reported by CNA's or her one to one on the 3PM to 11PM shift. Seizures are described a brief events of eyes rolling back and arms going up in the air, followed by hair pulling or slapping herself. These seizures were reported as either sporadic or every other day episodes.  There have been no documented grand mal seizures or drop attacks. She refuses to wear safety helmet, rips at her hair, and attempts to flip herself out of her chair and bed.  Maira can walk briefly but walks on her toes, she frequently will allow herself to fall down when she does not want to walk.  It does not appear that methylphenidate has been useful in maintaining her level of wakefulness during the day.    VNS generator change on 11/3/2015. The Model 103 (serial #[id number]) was replaced with [employer] Model 105, serial #[id number]. Weaned off of levetiracetam due to multiple medications and agitation; by the end of 2016, she was down to -500.      Summary of 2016:  Started with RFN [id number], -250, CLB 0.5-0.5-25,  QID, -200 attempted to wean off of LEV and reduced the dose of VPA given that there were no reports of seizures and she was sedated, along with elevated ammonia levels.  It was unclear as to how effective LEV was for her seizure control.  When she missed a dose of TPM in September 2016, she had multiple seizures.  We attempted to compensate for increase in seizures by increasing Onfi to 20-20; however, it seems that it made her more sedated.     Summary of 2017  RFN [id number], -250, Onfi 20-20, -200,  q8hrs. She has been more somnolent.  She continues to have tonic seizures when she is asleep, eyes rolling body, arms will tense up with some twitching, last a few seconds, but will recur. There are no seizures during the daytime.  We attempted to wean off of VPA due to ammonia / somnolence; but there was an increase in tonic seizures (tonic stiffness, eyes rolling upwards, and  subtle arm (right?) jerking).     Hospital admission 10/10-10/26/2017, due to seizures in setting of infection, was put on continuous video EEG monitoring to determine her seizure type, seizure frequency, and rapid medication adjustments.  Rapid med changes: d/c'ed lamotrigine, restarted levetiracetam, attempted discontinuation of valproic acid (more seizures, so restarted), attempted reduction in Onfi, and starting Fycompa.  The patient's seizures were mostly based during sleep, tonic seizures.      Summary of 2018  Started with RFN [id number], PER 8, LEV 4299-7594, CLB 5-15, -250,  TID.  Due to excessive somnolence Onfi was weaned off.  She was on continuous EEG monitoring when she was in hospital for PNA that showed very frequent tonic seizures during sleep.  VPA is causing hyperammoniemia.  She has had more admissions for somnolence / lethargy, VPA was reduced for transaminitis.  She was tested for inborn error of metabolism with plasma amino acid, urine organic acid, and acylcarnitine levels.  There were nonspecific elevations in some amino acid or organic acid but no pattern consistent with a specific inborn error of metabolism.  Elevated carnitine level was consistent with supplementation.  Higher doses of phenobarbital may also contribute to sedation, phenobarbital was reduced to 20mg but on follow-up she was on 20mL of oral solution (80mg / day).    There is variable reporting in the frequency of tonic seizures (these are the eyes are rolling back and shaking, then stop, then happen again in 10-15 minutes).  Her level of alertness is also variable with erratic sleep cycle.    Summary 2019  We started the year with  TID, RFN 7117-1093, -200, LEV 2615-6999, PER 10, PB 40 qHS.  In December 2018, Hospital admission for septic shock and aspiration PNA.  She had an EEG that captured recurrent tonic seizures during sleep but this is consistently found in all of her other EEG studies.   Another hospital admission January 2019 for recurrent convulsive seizures.  Due to sedation Phenobarbital was weaned off and Fycompa was increased to 10mg at bedtime.  TPM increased to 250-250.  Tried to wean off of VPA due to ammonia levels.  We started Epidiolex in the Spring 2019, which seems to have improved seizure control.    January 2019 - Her CNA reports that Maira is no longer Maira, she is essentially bed ridden.  She does not eat and does not walk.  Maira usually participate in activities, walking, and eating food (if she was at a [employer] she would be able to eat a hamburger without difficulty).  I was able to speak to her father Oneil and he reported that he too saw a significant decline in mental status.  There was a phone call from [facility] reporting an increase in seizure activity (brief episodes tonic-myoclonic jerking); but subsequent reports did not notice an increase in activity.      Weaned off of phenobarbital, suspecting that it was causing elevated transaminases.  There is variable periods of wakefulness and alertness; seizures are reported sporadically, sometimes she seems to have clusters of seizures (during an office visit there were about 8 tonic seizures lasting 15-20 seconds).  We weaned her off of valproic acid and increased Epidiolex.    June 2019, hospitalized for status epilepticus (multiple clinical seizures, every 5-6 minutes, associated with apnea).  Continuous EEG monitoring showed very frequent 15-30 seconds tonic seizures , whether this is different from her baseline is difficult to determine.  Since she was on continuous EEG monitoring a number of medication trials were given.  At one point it seems that lorazepam and more levetiracetam had improved seizures.  Her tube feed was adjust to more protein and lower carbohydrates.  2019 - multiple phone calls regarding recurrent seizures (generalized shaking, tonic body rigidity and clonic activity)    Summary  2020  RUF [id number], -250, LEV 1000 q8hr, -400, JESSICA 50-50, CLB 5 qHS.  Replacement of her VNS in December 2019, went from M105 to  model.  Seizure reporting had been sporadic.  Despite medication adjustments, there has been no improvement in degree of wakefulness, nonverbal, calls out at times.  She does not do much.  Transitioned LEV to Brivaracetam.  Increased clobazam to 10mg at bedtime on 5/28/2020.  Unclear if there has been improvement in seizure frequency; but there were no admissions to hospital for seizures.    Summary 2021  RUF [id number], -250, -400, JESSICA 75-75, CLB 5-10.  She was admitted to Hospital on 2/2/2021 for multiple seizures in one day.  Onfi was increased with an extra 5mg tab in AM.  With the increase in Onfi, there has been no report of excessive sedation.  She continues to have stretches of non-24 hour sleep cycle.  She has periods when she appears to be active, smiling and laughter and periods when she has no interactions with those around her.  Her seizures are very difficult to notice, unless someone is with her.  We had tried to reduce her Brivaracetam dose as there was no clear improvement at higher dose and she has been losing weight.  We started Vimpat at the end of the year.    Summary 2022  RUF [id number], -250, -400, BRV 50-50, CLB 5-10.    Often seems to be sleepy.  She is sometimes on a play mat, she mostly lays there, sometimes she plays with her toys.  She may scream or yell throughout the day.  One time she was able to say her name and clap her hands and laugh at something.  She has moments of increased alertness and awareness.    She continues to have occasional witnessed seizures (body and head rigidity, eyes rolling up, sometimes mild twitching of the arms). The last time a rescue medication was needed was back in September 2021.    We tried to reduce her dose of clobazam to reduce sedation and replace it with  lacosamide.    Summary [id number]  RUF [id number], -250, -400, BRV 50-50, CLB 10, -150.  She continues to have episodic seizures.  Seizures involved whole body shaking, eyes fixed, drooling.  Her clobazam was increased when there was a cluster of seizures.  She has variable degree of wakefulness, sometimes she is wild and screaming, sometimes she is lethargic.  Most of her seizures are the tonic seizure type.  No report of tonic-clonic seizures.    She was admitted to hospital on 2023 due to dislodged G-tube, she was not able to get her antiseizure medications while she did not have a G-tube, so she had a cluster of 6-7 generalized tonic clonic seizures (upper body stiffening and shaking and unresponsiveness).  She had an EEG study that showed multiple tonic seizures.    She continued to have episodic generalized tonic seizures, sometimes witnessed in clusters.  We had increased her clobazam, lacosamide, and brivaracetam doses.    Her VNS was near end of life and was changed in 2024.    Special Features  Status epilepticus: yes  Self Injury Seizures: No  Precipitating Factors: menstrual cycle??    Epilepsy Risk Factors:  Abnormal pregnancy: unknown  Abnormal birth/: unknown  Abnormal Development: unknown developmental history  Febrile seizures, simple: unknown  Febrile seizures, complex: unknown  CNS infection: No  Mental retardation: Yes  Cerebral palsy: Yes  Head injury (moderate/severe): possibly anoxic brain injury  CNS neoplasm: No  CNS malformation: No  Neurosurgical procedure: No  Stroke: No  Alcohol abuse: No  Drug abuse: No  Family history Sz/epilepsy: unknown    Prior AEDs:  Rufinamide  Clobazam (excessive sedation)  Clonazepam  Levetiracetam (unclear if beneficial)  Lamotrigine (unclear if beneficial)  Topiramate (missed doses caused breakthrough convulsive type of seizures)  Valproate (elevated ammonia levels)  Fycompa (excess sedation)  Felbamate (listed as a drug  allergy)  Phenobarbital (contributing to sedation)  Epidiolex (seems to help the most)  Brivaracetam  Lacosamide    Prior workup:  No radiology study reviewed during this visit   Imaging:  CT head 2/21/2013, 9/7/2018  No acute intracranial pathology    3/20/2019  MRI brain w/wo contrast  Normal MR brain study  Symmetric hippocampal formations    EEGs:  Continuous video EEG monitoring 10/13 - 10/15/2017  Frequent generalized spike/polyspike-slow wave complexes during sleep  At times there are independent right more than left midtemporal spikes and bitemporal spikes    Innumerable generalized tonic seizures during sleep, generalized 1 Hz period discharges, that would become continuous for 30 seconds or generalized rhythmic alpha-beta discharges, associated with patient becoming rigid, tonic posturing with arms elevated and tense with subtle jerking of the upper body, eyes opening with upward deviation.  Ictal patterns:  1 - Seconds of diffuse electrodecrement then paroxysmal fast activity followed by 2Hz spike wave discharges (clinically accompanied by posturing of the arms, then clonic jerking)    Continuous video EEG monitoring 10/18 - 10/25/2017  Diffuse background slowing with bursts of arrhythmic generalized spike wave discharges, with shifting lateralization, and independent left and  right temporal spikes  Mild eyelid myoclonia associated with brief bursts of 2-2.5 Hz generalized discharges  Tonic seizures with eelctrodecrement  There were innumerable tonic seizures; however by 10/25/2017 the frequency of these seizures did decrease in frequency.    Continuous video EEG monitoring 12/1-12/5/2017  There are innumerable tonic seizures that are generally less than one minute in duration (30-40 seconds), these consists of subtle eye opening and tonic stiffening of arms, if longer then whole body stiffening with clonic jerking movements.  These almost always occur exclusively out of sleep.  These consist of 2-2.5 Hz  generalized spike-slow wave complexes with generalized attenuation of activity (desynchronization) or paroxysmal fast activity.  Per 24 hours count of seizures could be .    12/19/2018 routine study  Frequent recurrent tonic seizures associated with paroxysmal generalized fast activity then generalized rhythmic discharges associated with eyes upward deviation, and myoclonic/clonic jerking of the upper body, excess beta activity.    6/28-7/3/2019 continuous video EEG monitoring  Frequent clusters of tonic seizures (body rigidity, head flexions, eyes go up with dysconjugate gaze, and clonic activity of the arms for a few seconds), these are associated with GPFA and rhythmic spike-slow waves.  There are also bilateral temporal focal epileptiform discharges.    12/8/2023  Nine episodes of bursts of high amplitude 15Hz GPFA, lasting 7-10 seconds, correlated with mild partial eye opening.  Background is disorganized, theta-delta slowing and intermixed faster activities.    Labs:  Component      Latest Ref Rng 11/15/2024   Sodium      135 - 147 mmol/L 141    Potassium      3.5 - 5.3 mmol/L 4.1    Chloride      96 - 108 mmol/L 107    Carbon Dioxide      21 - 32 mmol/L 26    ANION GAP      4 - 13 mmol/L 8    BUN      5 - 25 mg/dL 19    Creatinine      0.60 - 1.30 mg/dL 0.55 (L)    GLUCOSE      65 - 140 mg/dL 105    Calcium      8.4 - 10.2 mg/dL 10.1    AST      13 - 39 U/L 25    ALT      7 - 52 U/L 32    ALK PHOS      34 - 104 U/L 80    Total Protein      6.4 - 8.4 g/dL 8.1    Albumin      3.5 - 5.0 g/dL 4.3    Total Bilirubin      0.20 - 1.00 mg/dL 0.30    GFR, Calculated      ml/min/1.73sq m 115    Clobazam      30 - 300 ng/mL 79    Desmethylclobazam      300 - 3000 ng/mL 2528    TOPIRAMATE LEVEL      2.0 - 25.0 ug/mL 12.1    Lacosamide      1.00 - 20.00 ug/mL 7.17    Rufinamide (Banzel)      ug/mL 15.6    BRIVARACETAM      0.20 - 2.00 ug/mL 1.62         General exam   Blood pressure (!) 82/45, pulse 65, temperature  "98 °F (36.7 °C), temperature source Temporal, resp. rate 14, height 5' 1\" (1.549 m), weight 44.6 kg (98 lb 6.4 oz), SpO2 95%.  Appearance: facial dysmorphia of intellectual disability; poor dentition; she is excessively somnolent, minimally opens eye when tapped on the shoulder, no response to verbal stimulation  Cardiovascular: no murmur is present  Pulmonary: clear to auscultation on the left axillary region   Abdomen: nondistended, bowel sounds are present  Extremities: there is no peripheral edema    HEENT: moist mucus membranes, poor dentition, dysconjugate gaze   Fundoscopy: not assessed    Mental status  Orientation: somnolent, no period of wakefulness during the visit  Due to her intellectual disability, she is nonverbal so Fund of Knowledge, Attention and Concentration, and Memory cannot be tested  Language: no vocalizations during this visit, no meaningful language productions    Cranial Nerves  CN 1: not tested  CN 2: pupils are symmetric, round, and equally reactive to light   CN 3, 4, 6: no nystagmus is present  CN 5:corneal reflex is intact symmetrically  CN 7:muscles of facial expression are symmetric  CN 8: not assessed  CN 9,10: not assessed  CN 11: not assessed  CN 12:not assessed    Motor:  Bulk, Tone: hypotonic muscle tone  Pronation: not assessed  Strength: Unable to assess limb strength by confrontational testing.  Legs are bent at the knees, does not slide down; no volitional movements or resistance against passive movement of the limbs  Abnormal movements: None    Sensory:  Lighttouch: not assessed    Coordination:  Unable to test    Reflexes:   Reflexes were not assessed    Past Medical/Surgical History:  Patient Active Problem List   Diagnosis    Positive blood culture    Osteoporosis    Onychomycosis    Hyperkeratosis    Cerebral anoxic injury (HCC)    Nonintractable Lennox-Gastaut syndrome without status epilepticus (HCC)    Somnolence    Incontinence    Skin breakdown    MRSA " colonization    Right atrial enlargement    Hypophosphatemia    Sedated due to multiple medications    Breast mass    S/P placement of VNS (vagus nerve stimulation) device    Dislodged gastrostomy tube    Fatty infiltration of liver    Intellectual disability with epilepsy (HCC)    Labyrinthine disorder    Labial cyst    PEG tube malfunction (HCC)    Fracture of tooth    Hypotension     Past Surgical History:   Procedure Laterality Date    ABDOMINAL SURGERY      CARDIAC PACEMAKER PLACEMENT      vns implant l chest    IR GASTROJEJUNOSTOMY (GJ) TUBE CHECK/CHANGE/REINSERTION/UPSIZE  3/31/2025    IR GASTROJEJUNOSTOMY (GJ) TUBE PLACEMENT  12/8/2023    IR GASTROSTOMY (G) TUBE CHECK/CHANGE/REINSERTION/UPSIZE  6/21/2024    IR GASTROSTOMY (G) TUBE CHECK/CHANGE/REINSERTION/UPSIZE  9/26/2024    IR GASTROSTOMY TUBE PLACEMENT  11/21/2019    IR THORACENTESIS  12/17/2018    JEJUNOSTOMY FEEDING TUBE      history of - most recently, PEG tube    PEG TUBE PLACEMENT      OK INSJ/RPLCMT CRANIAL NEUROSTIM PULSE GENERATOR Left 12/18/2019    Procedure: REPLACEMENT IMPLANTABLE PULSE GENERATOR FOR VAGAL NERVE STIMULATOR, LEFT CHEST;  Surgeon: Patrick Canales MD;  Location: [facility];  Service: Neurosurgery    VAGAL NERVE STIMULATOR (VNS) PLACEMENT Left 8/12/2024    Procedure: Reopening of the left chest incision for placement of implantable pulse generator for vagal nerve stimulator;  Surgeon: Charles Pendleton MD;  Location: [facility];  Service: Neurosurgery     Past Psychiatric History:  History of behavioral agitation but she is no longer on a one to one because she is generally too sedated.    Medications:    Current Outpatient Medications:     acetaminophen (TYLENOL) 325 mg tablet, Take 650 mg by mouth every 4 (four) hours as needed for mild pain or fever, Disp: , Rfl:     bisacodyl (DULCOLAX) 10 mg suppository, Insert 10 mg into the rectum in the morning., Disp: , Rfl:     Brivaracetam (Briviact) 100 MG TABS, 1 tablet (100 mg total) by Per  PEG Tube route 2 (two) times a day, Disp: 60 tablet, Rfl: 5    cannabidiol (Epidiolex) 100 mg/ml SOLN, 5 mL (500 mg total) by Per G Tube route 2 (two) times a day, Disp: 300 mL, Rfl: 5    cloBAZam (Onfi) 10 MG tablet, 1 tablet (10 mg total) by Per G Tube route 2 (two) times a day, Disp: 60 tablet, Rfl: 5    diazepam (VALIUM) 5 MG/ML solution, If the patient has a seizure lasting more than 3 minutes then give 1mL by PEG tube, may repeat 1mL if she continues to have seizure for more than 10 minutes., Disp: 30 mL, Rfl: 0    lacosamide (VIMPAT) 200 mg tablet, 1 tablet (200 mg total) by Per PEG Tube route every 12 (twelve) hours, Disp: 60 tablet, Rfl: 5    magnesium hydroxide (MILK OF MAGNESIA) 400 mg/5 mL oral suspension, 30 mL by Per G Tube route daily as needed for constipation, Disp: , Rfl:     Probiotic Product (ALEXANDER-BID PROBIOTIC PO), 1 tablet by Per G Tube route in the morning., Disp: , Rfl:     rufinamide (BANZEL) 400 mg tablet, 4 tablets (1,600 mg total) by Per G Tube route every 12 (twelve) hours, Disp: 240 tablet, Rfl: 5    topiramate (TOPAMAX) 50 MG tablet, 5 tablets (250 mg total) by Per G Tube route every 12 (twelve) hours, Disp: 300 tablet, Rfl: 5    VITAMIN D PO, Take 1,000 mg by mouth in the morning Given in her G-tube, Disp: , Rfl:     Allergies:  Allergies   Allergen Reactions    Felbamate      Family history:  No family history on file.  There is no family history of seizure, epilepsy or developmental delay.      Social History  Living situation:  Resident of [facility]   reports that she has never smoked. She has never been exposed to tobacco smoke. She has never used smokeless tobacco. She reports that she does not drink alcohol and does not use drugs.     Neurology/Epilepsy Procedure Note  VNS Interrogation    Model: AppUpper - ASOva   S/N: [id number]  Date of implant: 12-Aug-2024    Current settings:  Output Current 2.0 mAmp   Signal Frequency 20 Hz   Pulse Width 500 microsec    Signal On Time 30 sec   Signal Off Time 1.1 min     AutoStimulation settings:  AutoStim Output 2.0 mAmp   Pulse Width 500 microsec   AutoStim On Time 30 sec     Magnet settings:  Mag Output 2.25 mAmp   Pulse Width 500 microsec   Mag On Time 60 sec     Tachycardia Detection:  Enabled  Heartbeat verification: not done  Heartbeat Sensitivity:  5  Threshold:  20%    Diagnostics:  Communication OK  Output current 2.0 mAmp  Lead Impedance OK  Impedance value 1768 Ohms  IFI OK  Battery indicator %    Lifetime Magnet activation 5  % stimulation 35%    Start time: 10:15AM  End time: 10:55AM  Administrative Statements   I have spent a total time of 40 minutes in caring for this patient on the day of the visit/encounter including Diagnostic results, Patient and family education, Impressions, Documenting in the medical record, Reviewing/placing orders in the medical record (including tests, medications, and/or procedures), and Obtaining or reviewing history  .

## 2025-06-03 ENCOUNTER — OFFICE VISIT (OUTPATIENT)
Dept: NEUROLOGY | Facility: CLINIC | Age: 45
End: 2025-06-03
Payer: MEDICARE

## 2025-06-03 VITALS
HEIGHT: 61 IN | BODY MASS INDEX: 18.58 KG/M2 | TEMPERATURE: 98 F | HEART RATE: 65 BPM | WEIGHT: 98.4 LBS | SYSTOLIC BLOOD PRESSURE: 82 MMHG | RESPIRATION RATE: 14 BRPM | DIASTOLIC BLOOD PRESSURE: 45 MMHG | OXYGEN SATURATION: 95 %

## 2025-06-03 DIAGNOSIS — G40.813 INTRACTABLE LENNOX-GASTAUT SYNDROME WITH STATUS EPILEPTICUS (HCC): Primary | ICD-10-CM

## 2025-06-03 DIAGNOSIS — G40.814 INTRACTABLE LENNOX-GASTAUT SYNDROME WITHOUT STATUS EPILEPTICUS (HCC): ICD-10-CM

## 2025-06-03 PROCEDURE — G2211 COMPLEX E/M VISIT ADD ON: HCPCS | Performed by: PSYCHIATRY & NEUROLOGY

## 2025-06-03 PROCEDURE — 99215 OFFICE O/P EST HI 40 MIN: CPT | Performed by: PSYCHIATRY & NEUROLOGY

## 2025-06-03 RX ORDER — BRIVARACETAM 100 MG/1
100 TABLET, FILM COATED ORAL 2 TIMES DAILY
Qty: 60 TABLET | Refills: 5 | Status: SHIPPED | OUTPATIENT
Start: 2025-06-03

## 2025-06-03 RX ORDER — TOPIRAMATE 50 MG/1
250 TABLET, FILM COATED ORAL EVERY 12 HOURS SCHEDULED
Qty: 300 TABLET | Refills: 5 | Status: SHIPPED | OUTPATIENT
Start: 2025-06-03

## 2025-06-03 RX ORDER — RUFINAMIDE 400 MG/1
1600 TABLET, FILM COATED ORAL EVERY 12 HOURS
Qty: 240 TABLET | Refills: 5 | Status: SHIPPED | OUTPATIENT
Start: 2025-06-03

## 2025-06-03 RX ORDER — DIAZEPAM ORAL SOLUTION (CONCENTRATE) 5 MG/ML
SOLUTION ORAL
Qty: 30 ML | Refills: 0 | Status: SHIPPED | OUTPATIENT
Start: 2025-06-03

## 2025-06-03 RX ORDER — LACOSAMIDE 200 MG/1
200 TABLET ORAL EVERY 12 HOURS SCHEDULED
Qty: 60 TABLET | Refills: 5 | Status: SHIPPED | OUTPATIENT
Start: 2025-06-03

## 2025-06-03 RX ORDER — CLOBAZAM 10 MG/1
10 TABLET ORAL 2 TIMES DAILY
Qty: 60 TABLET | Refills: 5 | Status: SHIPPED | OUTPATIENT
Start: 2025-06-03

## 2025-06-03 NOTE — PATIENT INSTRUCTIONS
Medications are given by PEG tube  1 - Continue Epidiolex 100mg/mL 5.0 mL (500mg) every 12 hours  2 - Continue Briviact 100 mg tab one tabe every 12 hours  3 - Continue with Topiramate 50mg tabs give 5 tabs (250mg) every 12 hours  4 - continue Banzel 400mg give 4 tabs every 12 hours  5 - Continue Clobazam 10mg tab give one tab every 12 hours  6 - Continue Lacosamide 200mg tab one tab every 12 hours.  7 - give diazepam 5mg/mL 1 mL as needed for seizure last more than 3 minutes and repeat second dose for seizure lasting more than 10 minutes  8 - Call the office if there is increase in seizure frequency  9 - prior to next visit check lacosamide, clobazam, and topiramate levels, CMP   10 - will mail to brother [employer] Ex[employer] sequencing consent form  11 - follow-up in 3 months with advanced practitioner

## 2025-06-04 ENCOUNTER — TELEPHONE (OUTPATIENT)
Age: 45
End: 2025-06-04

## 2025-06-04 NOTE — TELEPHONE ENCOUNTER
Kenia from [facility] called to schedule a follow up appointment for Patient.     Duran Aquino appointment was made yesterday for 9/4/2025 at 10:30 am with Li Vance in [place].

## 2025-06-07 PROBLEM — G40.814 INTRACTABLE LENNOX-GASTAUT SYNDROME WITHOUT STATUS EPILEPTICUS (HCC): Status: ACTIVE | Noted: 2018-04-23

## 2025-06-08 NOTE — ASSESSMENT & PLAN NOTE
Orders:    Brivaracetam (Briviact) 100 MG TABS; 1 tablet (100 mg total) by Per PEG Tube route 2 (two) times a day    cloBAZam (Onfi) 10 MG tablet; 1 tablet (10 mg total) by Per G Tube route 2 (two) times a day    diazepam (VALIUM) 5 MG/ML solution; If the patient has a seizure lasting more than 3 minutes then give 1mL by PEG tube, may repeat 1mL if she continues to have seizure for more than 10 minutes.    lacosamide (VIMPAT) 200 mg tablet; 1 tablet (200 mg total) by Per PEG Tube route every 12 (twelve) hours    rufinamide (BANZEL) 400 mg tablet; 4 tablets (1,600 mg total) by Per G Tube route every 12 (twelve) hours    topiramate (TOPAMAX) 50 MG tablet; 5 tablets (250 mg total) by Per G Tube route every 12 (twelve) hours    Topiramate level; Future    Comprehensive metabolic panel; Future    Clobazam; Future    Lacosamide; Future

## 2025-06-08 NOTE — ASSESSMENT & PLAN NOTE
Orders:    cannabidiol (Epidiolex) 100 mg/ml SOLN; 5 mL (500 mg total) by Per G Tube route 2 (two) times a day

## 2025-06-11 ENCOUNTER — TELEPHONE (OUTPATIENT)
Dept: NEUROLOGY | Facility: CLINIC | Age: 45
End: 2025-06-11

## 2025-06-11 NOTE — TELEPHONE ENCOUNTER
----- Message from Quynh España MD sent at 6/9/2025 10:22 AM EDT -----  Regarding: FW: reminder to complete [employer] test form  I completed the [employer] Xome test requisition form.We need his brother to sign the consent form (last page).  Please scan it into the chart then mail the form to the patient's brother with a self-addressed envelope to come back to us.(Li is given the form to bring to [facility]).Sam  ----- Message -----  From: Quynh España MD  Sent: 6/9/2025  12:00 AM EDT  To: Quynh España MD  Subject: reminder to complete [facility] test form            Complete the [facility] Exome test requisition and send to Leena Kent.

## 2025-06-30 ENCOUNTER — TELEPHONE (OUTPATIENT)
Dept: NEUROLOGY | Facility: CLINIC | Age: 45
End: 2025-06-30

## 2025-06-30 NOTE — TELEPHONE ENCOUNTER
Dr aponte signed the forms for the patient for gene dx. Can you please review them . Thank you. Forms are scanned in today's date under media.

## (undated) DEVICE — 3M™ IOBAN™ 2 ANTIMICROBIAL INCISE DRAPE 6640EZ: Brand: IOBAN™ 2

## (undated) DEVICE — DRAPE CAMERA/LASER

## (undated) DEVICE — SPONGE SCRUB 4 PCT CHLORHEXIDINE

## (undated) DEVICE — ANTIBACTERIAL VIOLET BRAIDED (POLYGLACTIN 910), SYNTHETIC ABSORBABLE SUTURE: Brand: COATED VICRYL

## (undated) DEVICE — DRESSING MEPILEX AG BORDER 3 X 3 IN

## (undated) DEVICE — SUT MONOCRYL PLUS 4-0 PS-2 18 IN MCP496G

## (undated) DEVICE — NEEDLE 25G X 1 1/2

## (undated) DEVICE — TUBING SUCTION 5MM X 12 FT

## (undated) DEVICE — PAD GROUNDING DUAL ADULT

## (undated) DEVICE — SKN PRP WNG SPNGE PVP SCRB STR: Brand: MEDLINE INDUSTRIES, INC.

## (undated) DEVICE — CHLORAPREP HI-LITE 26ML ORANGE

## (undated) DEVICE — PAD GROUNDING ADULT

## (undated) DEVICE — GLOVE INDICATOR PI UNDERGLOVE SZ 7.5 BLUE

## (undated) DEVICE — PREP SURGICAL PURPREP 26ML

## (undated) DEVICE — DRESSING MEPILEX AG BORDER 4 X 4 IN

## (undated) DEVICE — GLOVE INDICATOR PI UNDERGLOVE SZ 8 BLUE

## (undated) DEVICE — BETHLEHEM UNIVERSAL MINOR GEN: Brand: CARDINAL HEALTH

## (undated) DEVICE — SUT SILK 2-0 SH 30 IN K833H

## (undated) DEVICE — PENCIL ELECTROSURG E-Z CLEAN -0035H

## (undated) DEVICE — GLOVE SRG BIOGEL ECLIPSE 7

## (undated) DEVICE — ELECTRODE BLADE MOD E-Z CLEAN 2.5IN 6.4CM -0012M

## (undated) DEVICE — ADHESIVE SKIN HIGH VISCOSITY EXOFIN 1ML

## (undated) DEVICE — BULB SYRINGE,IRRIGATION WITH PROTECTIVE CAP: Brand: DOVER

## (undated) DEVICE — GLOVE SRG BIOGEL 8

## (undated) DEVICE — NEEDLE 22 G X 1 1/2 SAFETY

## (undated) DEVICE — MEDI-VAC YANK SUCT HNDL W/TPRD BULBOUS TIP: Brand: CARDINAL HEALTH

## (undated) DEVICE — INTENDED FOR TISSUE SEPARATION, AND OTHER PROCEDURES THAT REQUIRE A SHARP SURGICAL BLADE TO PUNCTURE OR CUT.: Brand: BARD-PARKER SAFETY BLADES SIZE 10, STERILE

## (undated) DEVICE — LIGHT HANDLE COVER SLEEVE DISP BLUE STELLAR